# Patient Record
Sex: MALE | Race: ASIAN | NOT HISPANIC OR LATINO
[De-identification: names, ages, dates, MRNs, and addresses within clinical notes are randomized per-mention and may not be internally consistent; named-entity substitution may affect disease eponyms.]

---

## 2017-01-17 ENCOUNTER — APPOINTMENT (OUTPATIENT)
Dept: INTERNAL MEDICINE | Facility: CLINIC | Age: 57
End: 2017-01-17

## 2017-02-10 ENCOUNTER — MEDICATION RENEWAL (OUTPATIENT)
Age: 57
End: 2017-02-10

## 2017-02-14 ENCOUNTER — APPOINTMENT (OUTPATIENT)
Dept: INTERNAL MEDICINE | Facility: CLINIC | Age: 57
End: 2017-02-14

## 2017-02-14 ENCOUNTER — MEDICATION RENEWAL (OUTPATIENT)
Age: 57
End: 2017-02-14

## 2017-02-14 ENCOUNTER — NON-APPOINTMENT (OUTPATIENT)
Age: 57
End: 2017-02-14

## 2017-02-14 VITALS
SYSTOLIC BLOOD PRESSURE: 140 MMHG | HEART RATE: 105 BPM | HEIGHT: 66 IN | WEIGHT: 178 LBS | TEMPERATURE: 98.3 F | DIASTOLIC BLOOD PRESSURE: 80 MMHG | BODY MASS INDEX: 28.61 KG/M2

## 2017-02-15 LAB
ALBUMIN SERPL ELPH-MCNC: 4 G/DL
ALP BLD-CCNC: 70 U/L
ALT SERPL-CCNC: 19 U/L
ANION GAP SERPL CALC-SCNC: 15 MMOL/L
AST SERPL-CCNC: 16 U/L
BILIRUB SERPL-MCNC: 0.2 MG/DL
BUN SERPL-MCNC: 31 MG/DL
CALCIUM SERPL-MCNC: 9.4 MG/DL
CHLORIDE SERPL-SCNC: 102 MMOL/L
CHOLEST SERPL-MCNC: 224 MG/DL
CHOLEST/HDLC SERPL: 5 RATIO
CO2 SERPL-SCNC: 26 MMOL/L
CREAT SERPL-MCNC: 1.55 MG/DL
GLUCOSE SERPL-MCNC: 135 MG/DL
HBA1C MFR BLD HPLC: 11.4 %
HDLC SERPL-MCNC: 45 MG/DL
LDLC SERPL CALC-MCNC: 122 MG/DL
POTASSIUM SERPL-SCNC: 4.4 MMOL/L
PROT SERPL-MCNC: 6.3 G/DL
SODIUM SERPL-SCNC: 143 MMOL/L
TRIGL SERPL-MCNC: 284 MG/DL

## 2017-02-21 NOTE — ASU PATIENT PROFILE, ADULT - PMH
Diabetes Mellitus Type II, Uncontrolled    Diabetic retinopathy    Dyslipidemia    GERD (gastroesophageal reflux disease)    Gout    Hepatitis    HTN (hypertension)    Nephrolithiasis    s/p Angioplasty with Stent    Vitamin D deficiency

## 2017-02-21 NOTE — ASU PATIENT PROFILE, ADULT - PSH
H/O lithotripsy    History of eye surgery    Retinal Hemorrhage    S/P coronary artery stent placement H/O lithotripsy    History of eye surgery  left eye  Retinal Hemorrhage    S/P coronary artery stent placement

## 2017-02-22 ENCOUNTER — OUTPATIENT (OUTPATIENT)
Dept: OUTPATIENT SERVICES | Facility: HOSPITAL | Age: 57
LOS: 1 days | Discharge: HOME | End: 2017-02-22
Payer: COMMERCIAL

## 2017-02-22 ENCOUNTER — TRANSCRIPTION ENCOUNTER (OUTPATIENT)
Age: 57
End: 2017-02-22

## 2017-02-22 VITALS
OXYGEN SATURATION: 99 % | TEMPERATURE: 99 F | WEIGHT: 174.61 LBS | SYSTOLIC BLOOD PRESSURE: 172 MMHG | RESPIRATION RATE: 18 BRPM | DIASTOLIC BLOOD PRESSURE: 90 MMHG | HEART RATE: 90 BPM | HEIGHT: 67 IN

## 2017-02-22 VITALS
HEART RATE: 81 BPM | DIASTOLIC BLOOD PRESSURE: 84 MMHG | RESPIRATION RATE: 16 BRPM | SYSTOLIC BLOOD PRESSURE: 151 MMHG | OXYGEN SATURATION: 99 %

## 2017-02-22 DIAGNOSIS — Z95.5 PRESENCE OF CORONARY ANGIOPLASTY IMPLANT AND GRAFT: Chronic | ICD-10-CM

## 2017-02-22 DIAGNOSIS — Z98.89 OTHER SPECIFIED POSTPROCEDURAL STATES: Chronic | ICD-10-CM

## 2017-02-22 DIAGNOSIS — H43.11 VITREOUS HEMORRHAGE, RIGHT EYE: ICD-10-CM

## 2017-02-22 PROCEDURE — C1889: CPT

## 2017-02-22 PROCEDURE — 67113 REPAIR RETINAL DETACH CPLX: CPT | Mod: RT

## 2017-02-22 NOTE — ASU DISCHARGE PLAN (ADULT/PEDIATRIC). - SPECIAL INSTRUCTIONS
do not rub eye. tylenol for discomfort.  avoid advil motrin aleve aspirin to decrease chance of bleeding. eye kit given to pt.  Discharge and follow up  instructions explained to pt.  pt understands proper use of eye drops and instructions. do not rub eye. tylenol for discomfort.  avoid advil motrin aleve aspirin to decrease chance of bleeding. eye kit given to pt.  Discharge and follow up  instructions explained to pt.  pt understands proper use of eye drops and instructions. do not sleep on back . face down 50 minutes out of each hour.

## 2017-02-22 NOTE — ASU DISCHARGE PLAN (ADULT/PEDIATRIC). - NOTIFY
Pain not relieved by Medications/Fever greater than 101/Bleeding that does not stop/Persistent Nausea and Vomiting Persistent Nausea and Vomiting/Fever greater than 101/Bleeding that does not stop/Pain not relieved by Medications/Inability to Tolerate Liquids or Foods

## 2017-02-22 NOTE — ASU DISCHARGE PLAN (ADULT/PEDIATRIC). - COMMENTS
do not rub eye. tylenol for discomfort.  avoid advil motrin aleve aspirin to decrease chance of bleeding. eye kit given to pt.  Discharge and follow up  instructions explained to pt.  pt understands proper use of eye drops and instructions.

## 2017-03-23 ENCOUNTER — TRANSCRIPTION ENCOUNTER (OUTPATIENT)
Age: 57
End: 2017-03-23

## 2017-03-23 ENCOUNTER — OUTPATIENT (OUTPATIENT)
Dept: OUTPATIENT SERVICES | Facility: HOSPITAL | Age: 57
LOS: 1 days | End: 2017-03-23
Payer: COMMERCIAL

## 2017-03-23 VITALS
HEART RATE: 79 BPM | RESPIRATION RATE: 18 BRPM | DIASTOLIC BLOOD PRESSURE: 83 MMHG | SYSTOLIC BLOOD PRESSURE: 167 MMHG | OXYGEN SATURATION: 98 %

## 2017-03-23 VITALS
HEIGHT: 67 IN | HEART RATE: 85 BPM | TEMPERATURE: 99 F | RESPIRATION RATE: 15 BRPM | WEIGHT: 173.72 LBS | OXYGEN SATURATION: 100 % | SYSTOLIC BLOOD PRESSURE: 178 MMHG | DIASTOLIC BLOOD PRESSURE: 78 MMHG

## 2017-03-23 DIAGNOSIS — H43.11 VITREOUS HEMORRHAGE, RIGHT EYE: ICD-10-CM

## 2017-03-23 DIAGNOSIS — Z95.5 PRESENCE OF CORONARY ANGIOPLASTY IMPLANT AND GRAFT: Chronic | ICD-10-CM

## 2017-03-23 DIAGNOSIS — Z98.89 OTHER SPECIFIED POSTPROCEDURAL STATES: Chronic | ICD-10-CM

## 2017-03-23 DIAGNOSIS — E11.319 TYPE 2 DIABETES MELLITUS WITH UNSPECIFIED DIABETIC RETINOPATHY WITHOUT MACULAR EDEMA: Chronic | ICD-10-CM

## 2017-03-23 PROCEDURE — 67040 LASER TREATMENT OF RETINA: CPT | Mod: RT

## 2017-03-23 PROCEDURE — C1889: CPT

## 2017-03-23 NOTE — ASU DISCHARGE PLAN (ADULT/PEDIATRIC). - NOTIFY
Fever greater than 101/Bleeding that does not stop/Persistent Nausea and Vomiting/Pain not relieved by Medications

## 2017-03-23 NOTE — ASU PATIENT PROFILE, ADULT - PSH
H/O lithotripsy    History of eye surgery  left eye  Retinal Hemorrhage    S/P coronary artery stent placement Diabetes with retinopathy  S/P PPV/PRP/GAS  OD 2/22/17 for viterous hemorrhage  H/O lithotripsy    History of eye surgery  left eye  Retinal Hemorrhage    S/P coronary artery stent placement

## 2017-03-23 NOTE — ASU DISCHARGE PLAN (ADULT/PEDIATRIC). - SPECIAL INSTRUCTIONS
Do not rub the operative eye   Resume regular medications as per your physician's instructions  May take Tylenol (Acetaminophen ) as needed for pain as directed  Bring all eye drops to the doctor's office for your follow-up visit

## 2017-03-27 ENCOUNTER — APPOINTMENT (OUTPATIENT)
Dept: OPHTHALMOLOGY | Facility: CLINIC | Age: 57
End: 2017-03-27

## 2017-04-13 ENCOUNTER — RX RENEWAL (OUTPATIENT)
Age: 57
End: 2017-04-13

## 2017-04-25 ENCOUNTER — LABORATORY RESULT (OUTPATIENT)
Age: 57
End: 2017-04-25

## 2017-04-25 ENCOUNTER — APPOINTMENT (OUTPATIENT)
Dept: INTERNAL MEDICINE | Facility: CLINIC | Age: 57
End: 2017-04-25

## 2017-04-25 VITALS
TEMPERATURE: 98.4 F | WEIGHT: 181 LBS | DIASTOLIC BLOOD PRESSURE: 70 MMHG | HEIGHT: 66 IN | HEART RATE: 116 BPM | BODY MASS INDEX: 29.09 KG/M2 | SYSTOLIC BLOOD PRESSURE: 150 MMHG | OXYGEN SATURATION: 98 %

## 2017-04-25 DIAGNOSIS — M25.449 EFFUSION, UNSPECIFIED HAND: ICD-10-CM

## 2017-04-25 DIAGNOSIS — Z23 ENCOUNTER FOR IMMUNIZATION: ICD-10-CM

## 2017-04-25 DIAGNOSIS — M65.9 SYNOVITIS AND TENOSYNOVITIS, UNSPECIFIED: ICD-10-CM

## 2017-04-25 RX ORDER — SIMVASTATIN 10 MG/1
10 TABLET, FILM COATED ORAL
Refills: 0 | Status: DISCONTINUED | COMMUNITY

## 2017-04-26 DIAGNOSIS — E53.8 DEFICIENCY OF OTHER SPECIFIED B GROUP VITAMINS: ICD-10-CM

## 2017-04-26 LAB
25(OH)D3 SERPL-MCNC: 12.9 NG/ML
ALBUMIN SERPL ELPH-MCNC: 4.2 G/DL
ALP BLD-CCNC: 89 U/L
ALT SERPL-CCNC: 25 U/L
ANION GAP SERPL CALC-SCNC: 17 MMOL/L
APPEARANCE: CLEAR
AST SERPL-CCNC: 27 U/L
BASOPHILS # BLD AUTO: 0 K/UL
BASOPHILS NFR BLD AUTO: 0 %
BILIRUB SERPL-MCNC: 0.2 MG/DL
BILIRUBIN URINE: NEGATIVE
BLOOD URINE: ABNORMAL
BUN SERPL-MCNC: 40 MG/DL
CALCIUM SERPL-MCNC: 9.5 MG/DL
CHLORIDE SERPL-SCNC: 102 MMOL/L
CHOLEST SERPL-MCNC: 234 MG/DL
CHOLEST/HDLC SERPL: 4.8 RATIO
CO2 SERPL-SCNC: 24 MMOL/L
COLOR: YELLOW
CREAT SERPL-MCNC: 1.52 MG/DL
EOSINOPHIL # BLD AUTO: 0.45 K/UL
EOSINOPHIL NFR BLD AUTO: 10 %
GLUCOSE QUALITATIVE U: 250 MG/DL
GLUCOSE SERPL-MCNC: 93 MG/DL
HCT VFR BLD CALC: 39.1 %
HDLC SERPL-MCNC: 49 MG/DL
HGB BLD-MCNC: 12.7 G/DL
KETONES URINE: ABNORMAL
LDLC SERPL CALC-MCNC: 110 MG/DL
LEUKOCYTE ESTERASE URINE: NEGATIVE
LYMPHOCYTES # BLD AUTO: 0.93 K/UL
LYMPHOCYTES NFR BLD AUTO: 20.9 %
MAN DIFF?: NORMAL
MCHC RBC-ENTMCNC: 29.4 PG
MCHC RBC-ENTMCNC: 32.5 GM/DL
MCV RBC AUTO: 90.5 FL
MONOCYTES # BLD AUTO: 0.93 K/UL
MONOCYTES NFR BLD AUTO: 20.9 %
NEUTROPHILS # BLD AUTO: 2.1 K/UL
NEUTROPHILS NFR BLD AUTO: 47.3 %
NITRITE URINE: NEGATIVE
PH URINE: 5.5
PLATELET # BLD AUTO: 234 K/UL
POTASSIUM SERPL-SCNC: 4.5 MMOL/L
PROT SERPL-MCNC: 7 G/DL
PROTEIN URINE: >300 MG/DL
RBC # BLD: 4.32 M/UL
RBC # FLD: 13.5 %
SODIUM SERPL-SCNC: 143 MMOL/L
SPECIFIC GRAVITY URINE: 1.03
TRIGL SERPL-MCNC: 373 MG/DL
TSH SERPL-ACNC: 3.14 UIU/ML
UROBILINOGEN URINE: 1 MG/DL
VIT B12 SERPL-MCNC: 242 PG/ML
WBC # FLD AUTO: 4.45 K/UL

## 2017-05-05 ENCOUNTER — APPOINTMENT (OUTPATIENT)
Dept: OPHTHALMOLOGY | Facility: CLINIC | Age: 57
End: 2017-05-05

## 2017-05-08 ENCOUNTER — EMERGENCY (EMERGENCY)
Facility: HOSPITAL | Age: 57
LOS: 1 days | Discharge: HOME | End: 2017-05-08
Attending: EMERGENCY MEDICINE | Admitting: EMERGENCY MEDICINE
Payer: COMMERCIAL

## 2017-05-08 VITALS
RESPIRATION RATE: 16 BRPM | HEART RATE: 97 BPM | TEMPERATURE: 98 F | OXYGEN SATURATION: 100 % | SYSTOLIC BLOOD PRESSURE: 178 MMHG | DIASTOLIC BLOOD PRESSURE: 116 MMHG

## 2017-05-08 DIAGNOSIS — Z98.89 OTHER SPECIFIED POSTPROCEDURAL STATES: Chronic | ICD-10-CM

## 2017-05-08 DIAGNOSIS — Z95.5 PRESENCE OF CORONARY ANGIOPLASTY IMPLANT AND GRAFT: Chronic | ICD-10-CM

## 2017-05-08 DIAGNOSIS — E11.319 TYPE 2 DIABETES MELLITUS WITH UNSPECIFIED DIABETIC RETINOPATHY WITHOUT MACULAR EDEMA: Chronic | ICD-10-CM

## 2017-05-08 LAB
ALBUMIN SERPL ELPH-MCNC: 3.3 G/DL — SIGNIFICANT CHANGE UP (ref 3.3–5)
ALP SERPL-CCNC: 72 U/L — SIGNIFICANT CHANGE UP (ref 40–120)
ALT FLD-CCNC: 14 U/L — SIGNIFICANT CHANGE UP (ref 4–41)
APTT BLD: 33.6 SEC — SIGNIFICANT CHANGE UP (ref 27.5–37.4)
AST SERPL-CCNC: 14 U/L — SIGNIFICANT CHANGE UP (ref 4–40)
BASOPHILS # BLD AUTO: 0.03 K/UL — SIGNIFICANT CHANGE UP (ref 0–0.2)
BASOPHILS NFR BLD AUTO: 0.4 % — SIGNIFICANT CHANGE UP (ref 0–2)
BILIRUB SERPL-MCNC: 0.3 MG/DL — SIGNIFICANT CHANGE UP (ref 0.2–1.2)
BUN SERPL-MCNC: 35 MG/DL — HIGH (ref 7–23)
CALCIUM SERPL-MCNC: 8.5 MG/DL — SIGNIFICANT CHANGE UP (ref 8.4–10.5)
CHLORIDE SERPL-SCNC: 101 MMOL/L — SIGNIFICANT CHANGE UP (ref 98–107)
CK MB BLD-MCNC: 1.6 — SIGNIFICANT CHANGE UP (ref 0–2.5)
CK MB BLD-MCNC: 1.7 — SIGNIFICANT CHANGE UP (ref 0–2.5)
CK MB BLD-MCNC: 3.05 NG/ML — SIGNIFICANT CHANGE UP (ref 1–6.6)
CK MB BLD-MCNC: 3.59 NG/ML — SIGNIFICANT CHANGE UP (ref 1–6.6)
CK SERPL-CCNC: 194 U/L — SIGNIFICANT CHANGE UP (ref 30–200)
CK SERPL-CCNC: 214 U/L — HIGH (ref 30–200)
CO2 SERPL-SCNC: 22 MMOL/L — SIGNIFICANT CHANGE UP (ref 22–31)
CREAT SERPL-MCNC: 1.78 MG/DL — HIGH (ref 0.5–1.3)
EOSINOPHIL # BLD AUTO: 0.22 K/UL — SIGNIFICANT CHANGE UP (ref 0–0.5)
EOSINOPHIL NFR BLD AUTO: 2.9 % — SIGNIFICANT CHANGE UP (ref 0–6)
GLUCOSE SERPL-MCNC: 379 MG/DL — HIGH (ref 70–99)
HBA1C BLD-MCNC: 9.3 % — HIGH (ref 4–5.6)
HCT VFR BLD CALC: 33.3 % — LOW (ref 39–50)
HGB BLD-MCNC: 10.9 G/DL — LOW (ref 13–17)
IMM GRANULOCYTES NFR BLD AUTO: 0.3 % — SIGNIFICANT CHANGE UP (ref 0–1.5)
INR BLD: 0.89 — SIGNIFICANT CHANGE UP (ref 0.88–1.17)
LYMPHOCYTES # BLD AUTO: 1.06 K/UL — SIGNIFICANT CHANGE UP (ref 1–3.3)
LYMPHOCYTES # BLD AUTO: 13.8 % — SIGNIFICANT CHANGE UP (ref 13–44)
MCHC RBC-ENTMCNC: 29.1 PG — SIGNIFICANT CHANGE UP (ref 27–34)
MCHC RBC-ENTMCNC: 32.7 % — SIGNIFICANT CHANGE UP (ref 32–36)
MCV RBC AUTO: 89 FL — SIGNIFICANT CHANGE UP (ref 80–100)
MONOCYTES # BLD AUTO: 0.57 K/UL — SIGNIFICANT CHANGE UP (ref 0–0.9)
MONOCYTES NFR BLD AUTO: 7.4 % — SIGNIFICANT CHANGE UP (ref 2–14)
NEUTROPHILS # BLD AUTO: 5.77 K/UL — SIGNIFICANT CHANGE UP (ref 1.8–7.4)
NEUTROPHILS NFR BLD AUTO: 75.2 % — SIGNIFICANT CHANGE UP (ref 43–77)
NT-PROBNP SERPL-SCNC: 1308 PG/ML — SIGNIFICANT CHANGE UP
PLATELET # BLD AUTO: 201 K/UL — SIGNIFICANT CHANGE UP (ref 150–400)
PMV BLD: 9.5 FL — SIGNIFICANT CHANGE UP (ref 7–13)
POTASSIUM SERPL-MCNC: 4.2 MMOL/L — SIGNIFICANT CHANGE UP (ref 3.5–5.3)
POTASSIUM SERPL-SCNC: 4.2 MMOL/L — SIGNIFICANT CHANGE UP (ref 3.5–5.3)
PROT SERPL-MCNC: 6 G/DL — SIGNIFICANT CHANGE UP (ref 6–8.3)
PROTHROM AB SERPL-ACNC: 10 SEC — SIGNIFICANT CHANGE UP (ref 9.8–13.1)
RBC # BLD: 3.74 M/UL — LOW (ref 4.2–5.8)
RBC # FLD: 13.5 % — SIGNIFICANT CHANGE UP (ref 10.3–14.5)
SODIUM SERPL-SCNC: 138 MMOL/L — SIGNIFICANT CHANGE UP (ref 135–145)
TROPONIN T SERPL-MCNC: < 0.06 NG/ML — SIGNIFICANT CHANGE UP (ref 0–0.06)
TROPONIN T SERPL-MCNC: < 0.06 NG/ML — SIGNIFICANT CHANGE UP (ref 0–0.06)
WBC # BLD: 7.67 K/UL — SIGNIFICANT CHANGE UP (ref 3.8–10.5)
WBC # FLD AUTO: 7.67 K/UL — SIGNIFICANT CHANGE UP (ref 3.8–10.5)

## 2017-05-08 PROCEDURE — 71020: CPT | Mod: 26

## 2017-05-08 PROCEDURE — 93306 TTE W/DOPPLER COMPLETE: CPT | Mod: 26

## 2017-05-08 PROCEDURE — 99220: CPT

## 2017-05-08 RX ORDER — DEXTROSE 50 % IN WATER 50 %
12.5 SYRINGE (ML) INTRAVENOUS ONCE
Qty: 0 | Refills: 0 | Status: DISCONTINUED | OUTPATIENT
Start: 2017-05-08 | End: 2017-05-12

## 2017-05-08 RX ORDER — SODIUM CHLORIDE 9 MG/ML
1000 INJECTION, SOLUTION INTRAVENOUS
Qty: 0 | Refills: 0 | Status: DISCONTINUED | OUTPATIENT
Start: 2017-05-08 | End: 2017-05-12

## 2017-05-08 RX ORDER — INSULIN LISPRO 100/ML
VIAL (ML) SUBCUTANEOUS
Qty: 0 | Refills: 0 | Status: DISCONTINUED | OUTPATIENT
Start: 2017-05-08 | End: 2017-05-12

## 2017-05-08 RX ORDER — INSULIN LISPRO 100/ML
10 VIAL (ML) SUBCUTANEOUS ONCE
Qty: 0 | Refills: 0 | Status: COMPLETED | OUTPATIENT
Start: 2017-05-08 | End: 2017-05-08

## 2017-05-08 RX ORDER — INSULIN LISPRO 100/ML
6 VIAL (ML) SUBCUTANEOUS ONCE
Qty: 0 | Refills: 0 | Status: COMPLETED | OUTPATIENT
Start: 2017-05-08 | End: 2017-05-08

## 2017-05-08 RX ORDER — SODIUM CHLORIDE 9 MG/ML
1000 INJECTION INTRAMUSCULAR; INTRAVENOUS; SUBCUTANEOUS ONCE
Qty: 0 | Refills: 0 | Status: COMPLETED | OUTPATIENT
Start: 2017-05-08 | End: 2017-05-08

## 2017-05-08 RX ORDER — ASPIRIN/CALCIUM CARB/MAGNESIUM 324 MG
325 TABLET ORAL DAILY
Qty: 0 | Refills: 0 | Status: DISCONTINUED | OUTPATIENT
Start: 2017-05-08 | End: 2017-05-12

## 2017-05-08 RX ORDER — TAMSULOSIN HYDROCHLORIDE 0.4 MG/1
0.4 CAPSULE ORAL AT BEDTIME
Qty: 0 | Refills: 0 | Status: DISCONTINUED | OUTPATIENT
Start: 2017-05-08 | End: 2017-05-12

## 2017-05-08 RX ORDER — INSULIN GLARGINE 100 [IU]/ML
55 INJECTION, SOLUTION SUBCUTANEOUS AT BEDTIME
Qty: 0 | Refills: 0 | Status: DISCONTINUED | OUTPATIENT
Start: 2017-05-08 | End: 2017-05-12

## 2017-05-08 RX ORDER — ALLOPURINOL 300 MG
300 TABLET ORAL
Qty: 0 | Refills: 0 | Status: DISCONTINUED | OUTPATIENT
Start: 2017-05-08 | End: 2017-05-12

## 2017-05-08 RX ORDER — LOSARTAN POTASSIUM 100 MG/1
25 TABLET, FILM COATED ORAL DAILY
Qty: 0 | Refills: 0 | Status: DISCONTINUED | OUTPATIENT
Start: 2017-05-08 | End: 2017-05-12

## 2017-05-08 RX ORDER — DEXTROSE 50 % IN WATER 50 %
1 SYRINGE (ML) INTRAVENOUS ONCE
Qty: 0 | Refills: 0 | Status: DISCONTINUED | OUTPATIENT
Start: 2017-05-08 | End: 2017-05-12

## 2017-05-08 RX ORDER — INSULIN GLARGINE 100 [IU]/ML
10 INJECTION, SOLUTION SUBCUTANEOUS ONCE
Qty: 0 | Refills: 0 | Status: DISCONTINUED | OUTPATIENT
Start: 2017-05-08 | End: 2017-05-08

## 2017-05-08 RX ORDER — DEXTROSE 50 % IN WATER 50 %
25 SYRINGE (ML) INTRAVENOUS ONCE
Qty: 0 | Refills: 0 | Status: DISCONTINUED | OUTPATIENT
Start: 2017-05-08 | End: 2017-05-12

## 2017-05-08 RX ORDER — GLUCAGON INJECTION, SOLUTION 0.5 MG/.1ML
1 INJECTION, SOLUTION SUBCUTANEOUS ONCE
Qty: 0 | Refills: 0 | Status: DISCONTINUED | OUTPATIENT
Start: 2017-05-08 | End: 2017-05-12

## 2017-05-08 RX ORDER — METFORMIN HYDROCHLORIDE 850 MG/1
500 TABLET ORAL
Qty: 0 | Refills: 0 | Status: DISCONTINUED | OUTPATIENT
Start: 2017-05-08 | End: 2017-05-08

## 2017-05-08 RX ORDER — ATORVASTATIN CALCIUM 80 MG/1
40 TABLET, FILM COATED ORAL AT BEDTIME
Qty: 0 | Refills: 0 | Status: DISCONTINUED | OUTPATIENT
Start: 2017-05-08 | End: 2017-05-12

## 2017-05-08 RX ORDER — INSULIN LISPRO 100/ML
15 VIAL (ML) SUBCUTANEOUS
Qty: 0 | Refills: 0 | Status: DISCONTINUED | OUTPATIENT
Start: 2017-05-08 | End: 2017-05-12

## 2017-05-08 RX ORDER — LORATADINE 10 MG/1
10 TABLET ORAL DAILY
Qty: 0 | Refills: 0 | Status: DISCONTINUED | OUTPATIENT
Start: 2017-05-08 | End: 2017-05-12

## 2017-05-08 RX ORDER — ASPIRIN/CALCIUM CARB/MAGNESIUM 324 MG
162 TABLET ORAL ONCE
Qty: 0 | Refills: 0 | Status: COMPLETED | OUTPATIENT
Start: 2017-05-08 | End: 2017-05-08

## 2017-05-08 RX ORDER — LOSARTAN POTASSIUM 100 MG/1
25 TABLET, FILM COATED ORAL ONCE
Qty: 0 | Refills: 0 | Status: COMPLETED | OUTPATIENT
Start: 2017-05-08 | End: 2017-05-08

## 2017-05-08 RX ORDER — METOPROLOL TARTRATE 50 MG
50 TABLET ORAL ONCE
Qty: 0 | Refills: 0 | Status: COMPLETED | OUTPATIENT
Start: 2017-05-08 | End: 2017-05-08

## 2017-05-08 RX ADMIN — INSULIN GLARGINE 55 UNIT(S): 100 INJECTION, SOLUTION SUBCUTANEOUS at 21:35

## 2017-05-08 RX ADMIN — Medication 50 MILLIGRAM(S): at 07:21

## 2017-05-08 RX ADMIN — SODIUM CHLORIDE 1000 MILLILITER(S): 9 INJECTION INTRAMUSCULAR; INTRAVENOUS; SUBCUTANEOUS at 19:46

## 2017-05-08 RX ADMIN — TAMSULOSIN HYDROCHLORIDE 0.4 MILLIGRAM(S): 0.4 CAPSULE ORAL at 21:35

## 2017-05-08 RX ADMIN — LOSARTAN POTASSIUM 25 MILLIGRAM(S): 100 TABLET, FILM COATED ORAL at 18:47

## 2017-05-08 RX ADMIN — Medication 162 MILLIGRAM(S): at 07:21

## 2017-05-08 RX ADMIN — LORATADINE 10 MILLIGRAM(S): 10 TABLET ORAL at 18:47

## 2017-05-08 RX ADMIN — LOSARTAN POTASSIUM 25 MILLIGRAM(S): 100 TABLET, FILM COATED ORAL at 07:21

## 2017-05-08 RX ADMIN — Medication 6 UNIT(S): at 14:50

## 2017-05-08 RX ADMIN — Medication 10 UNIT(S): at 18:45

## 2017-05-08 RX ADMIN — Medication 300 MILLIGRAM(S): at 18:47

## 2017-05-08 RX ADMIN — ATORVASTATIN CALCIUM 40 MILLIGRAM(S): 80 TABLET, FILM COATED ORAL at 21:35

## 2017-05-08 NOTE — ED PROVIDER NOTE - PSH
Diabetes with retinopathy  S/P PPV/PRP/GAS  OD 2/22/17 for viterous hemorrhage  H/O lithotripsy    History of eye surgery  left eye  Retinal Hemorrhage    S/P coronary artery stent placement

## 2017-05-08 NOTE — ED CDU PROVIDER NOTE - CHPI ED SYMPTOMS NEG
no fever/no syncope/no cough/no chills/no nausea/no back pain/no vomiting/no dizziness/no diaphoresis

## 2017-05-08 NOTE — ED ADULT TRIAGE NOTE - CHIEF COMPLAINT QUOTE
c.o cough sob and chest pain since last night. able to speak full sentences in triage. denies nausea/vomiting. elevated bp noted in triage, pt states he has not taken morning htn medications yet. pmh hepatitis, GERD, gout, htn

## 2017-05-08 NOTE — ED PROVIDER NOTE - MEDICAL DECISION MAKING DETAILS
CP concerning for ACS, EKG unchanged, will check labs/cxr, asa, morning BP meds, admit vs CDU for further cardiac workup CP concerning for ACS vs new onset CHF, EKG unchanged, will check labs/cxr, asa, morning BP meds, admit vs CDU for further cardiac workup. Well's PE score: 0 points.  Unlikely PE, also no risk factors.

## 2017-05-08 NOTE — ED PROVIDER NOTE - OBJECTIVE STATEMENT
58yo M w/ DM, HTN, CAD/stents, p/w CP described as tightness and SOB starting yest evening while doing household chores, had trouble sleeping 2/2 CP/SOB, symptoms did not improve so came to ED.  Denies fever, cough, diaphoresis, N/V, leg pain/swelling.  Last cath 2011 at [facility], last stress 4mo ago (pre-op for eye surgery), reportedly normal per pt.  Cards: Jonathan Torres ([place]) 56yo M w/ DM, HTN, CAD/stents, p/w CP described as tightness and SOB starting yesterday evening while doing light household chores, had trouble sleeping 2/2 CP/SOB, symptoms did not improve so came to ED.  Today pain somewhat better, but feeling SOB more. Denies fever, cough, diaphoresis, N/V, leg pain/swelling.  Last cath 2011 at [facility], last stress 4mo ago (pre-op for eye surgery), reportedly normal per pt.  NAD. No recent travel or immobilization.     Cards: Jonathan Torres ([place])

## 2017-05-08 NOTE — ED CDU PROVIDER NOTE - OBJECTIVE STATEMENT
57 year old male PMHx DM, HTN, diabetic retinopathy and neuropathy, cad with 2 stents, HLD, gout, htn presents with 1 day hx of CP non radiating and SOB q 5 minutes when he needs to "breath through his mouth".  1 day hx of difficulty ambulating due to cp and sob.   denies n/v, diaphoresis, back, neck and jaw pain.  denies recent travel and surgery. experiencing leg pain, numbness and tingling in LE, unchanged from prior.  denies erythema and or 1 calf enlargement.  perc score- 1, pmd Dr javier hickman, endo- dr. maikel jolly, card - dr mancia  elmer 57 year old male PMHx DM, HTN, diabetic retinopathy and neuropathy, cad with 2 stents, HLD, gout, htn presents with 1 day hx of CP non radiating and SOB q 5 minutes when he needs to "breath through his mouth".  1 day hx of difficulty ambulating due to cp and sob.   denies n/v, diaphoresis, back, neck and jaw pain.  denies recent travel and surgery. experiencing leg pain, numbness and tingling in LE, unchanged from prior.  denies erythema and or 1 calf enlargement.  perc score- 1, pmd Dr javier hickman, endo- dr. maikel jolly, card - dr gavino engle. 1st set troponin negative ekg negative

## 2017-05-08 NOTE — ED CDU PROVIDER NOTE - PLAN OF CARE
Follow up with your cardiologist for a post hospital visit today at 11:30 , taking copies of all results from the ER to be reviewed.   Continue all medications as previously prescribed prior to this hospital visit.  If any worsening , concerning , signs or symptoms return to the ER prior to seeing your cardiologist. Set up a follow up withy our primary physician for this week as well.

## 2017-05-08 NOTE — ED PROVIDER NOTE - PROGRESS NOTE DETAILS
Pt still CP free but having some SOB. Evaluated by cardiology, they do not recommend emergent cath as pt's primary complaint is SOB at this time, but they recommend echo and r/o. Will place in CDU for second set of CE and TTE.

## 2017-05-08 NOTE — ED CDU PROVIDER NOTE - PROGRESS NOTE DETAILS
maddie garcia - echo results reviewed with attending, pt c/o sob order cta maddie garcia - pt does not want endo consult.  Dr jolly pts - endo attending.  echo results reviewed with attending, pt c/o sob order cta cdu maddie olivarez: pt resting comfortably in b ed, has no complaints at thistime, will get a cta to r/o pe, vss, nad, l/s: clear b/l, CV: rrr no r,g,m ABD: snt nd nbs will reases ADELINE pts cardiologist Dr CONSUELO Torres over at [facility]. Pt can come see him in the office at 11:30 today.  DW Dr Zepeda who is ok with plan to dc pt home with private cardio fu shortly today. CDU MD CHO:  Pt resting comfortably, continues to have some mild chest discomfort and sob.  A&Ox3, ctabil, s1s2 rrr no m/r/g, abd soft non ttp no r/g, no pedal edema b/l.  Rpt ce's neg, no ekg changes.  Will discuss with pt cardiologist re: dispo. CDU MD CHO - Attending Discharge Note: Patient is stable.  Labs and tests reviewed with the patient.  The patient is stable for discharge.

## 2017-05-08 NOTE — ED CDU PROVIDER NOTE - ATTENDING CONTRIBUTION TO CARE
DR. GONZALEZ, ATTENDING MD-  I performed a face to face bedside interview with patient regarding history of present illness, review of symptoms and past medical history. I completed an independent physical exam.  I have discussed patient's plan of care with PA.   Documentation as above in the note.    58 y/o male with h/o cad with two stents, dm, htn c/o cp, sob.  Denies f/c, ha, neck stiffness, cough, abd pain, n/v/d, dysuria, rash.  Afebrile, vs wnl, nad, ctabil, s1s2 rrr no m/r/g, abd soft non ttp no r/g, no cva tenderness b/l, no leg swelling b/l, no rash.  Sent to CDU for acs r/o, cycle enzymes/ekg's, call pt's cardiologist to arrange for close f/u if neg ed w/u. DR. GONZALEZ, ATTENDING MD-  I performed a face to face bedside interview with patient regarding history of present illness, review of symptoms and past medical history. I completed an independent physical exam.  I have discussed patient's plan of care with PA.   Documentation as above in the note.    56 y/o male with h/o cad with two stents, dm, htn c/o cp, sob.  Denies f/c, ha, neck stiffness, cough, abd pain, n/v/d, dysuria, rash.  Afebrile, vs wnl, nad, ctabil, s1s2 rrr no m/r/g, abd soft non ttp no r/g, no cva tenderness b/l, no leg swelling b/l, no rash.  Sent to CDU for acs r/o, cycle enzymes/ekg's, call pt's cardiologist to arrange for close f/u if neg ed w/u.    CDU ACCEPTING ATTG NOTE Dr. Ambriz: I performed a face to face bedside interview with patient regarding history of present illness, review of symptoms and past medical history. I completed an independent physical exam.  I have discussed patient's plan of care with PA.   I agree with note as stated above, having amended the EMR as needed to reflect my findings.   This includes HISTORY OF PRESENT ILLNESS, HIV, PAST MEDICAL/SURGICAL/FAMILY/SOCIAL HISTORY, ALLERGIES AND HOME MEDICATIONS, REVIEW OF SYSTEMS, PHYSICAL EXAM, and any PROGRESS NOTES during the time I functioned as the attending physician for this patient.   57M hx htn, hld, dm, dm retinopathy and neuropathy, cad stents x 2 c/o intermitt cp and sob x 1 day. Past day patient notes cp, left sided, nonrad, q5 min at rest, associated with mouth breathing to catch breath, worse with ambulation. Denies fever, chills, n/v, palpitations, leg swelling, hemoptysis, pleurisy. Last cath 2011, last stress 4 mo ago nl as per patient not documented in EMR. PE nad, aaox3, ctabl, rrr, s1s2, abd soft ntnd, neg le edema. PLAN ce x 2, echo. Patient’s ce x 2 negative. ECHO c/w previous area of infarct. Patient anxious regarding persistent q5min labored breathing. Decision to CTA r/o PE.

## 2017-05-09 VITALS
HEART RATE: 97 BPM | RESPIRATION RATE: 18 BRPM | TEMPERATURE: 98 F | OXYGEN SATURATION: 97 % | SYSTOLIC BLOOD PRESSURE: 149 MMHG | DIASTOLIC BLOOD PRESSURE: 94 MMHG

## 2017-05-09 LAB
BASE EXCESS BLDV CALC-SCNC: 3.3 MMOL/L — SIGNIFICANT CHANGE UP
BLOOD GAS VENOUS - CREATININE: 1.32 MG/DL — HIGH (ref 0.5–1.3)
CHLORIDE BLDV-SCNC: 110 MMOL/L — HIGH (ref 96–108)
GAS PNL BLDV: 138 MMOL/L — SIGNIFICANT CHANGE UP (ref 136–146)
GLUCOSE BLDV-MCNC: 136 — HIGH (ref 70–99)
HCO3 BLDV-SCNC: 25 MMOL/L — SIGNIFICANT CHANGE UP (ref 20–27)
HCT VFR BLDV CALC: 40.3 % — SIGNIFICANT CHANGE UP (ref 39–51)
HGB BLDV-MCNC: 13.1 G/DL — SIGNIFICANT CHANGE UP (ref 13–17)
LACTATE BLDV-MCNC: 1.3 MMOL/L — SIGNIFICANT CHANGE UP (ref 0.5–2)
PCO2 BLDV: 45 MMHG — SIGNIFICANT CHANGE UP (ref 41–51)
PH BLDV: 7.4 PH — SIGNIFICANT CHANGE UP (ref 7.32–7.43)
PO2 BLDV: < 24 MMHG — LOW (ref 35–40)
POTASSIUM BLDV-SCNC: 3.5 MMOL/L — SIGNIFICANT CHANGE UP (ref 3.4–4.5)
SAO2 % BLDV: 24 % — LOW (ref 60–85)

## 2017-05-09 PROCEDURE — 99217: CPT

## 2017-05-09 PROCEDURE — 71275 CT ANGIOGRAPHY CHEST: CPT | Mod: 26

## 2017-05-09 RX ADMIN — LOSARTAN POTASSIUM 25 MILLIGRAM(S): 100 TABLET, FILM COATED ORAL at 05:51

## 2017-05-09 RX ADMIN — Medication 15 UNIT(S): at 09:38

## 2017-06-23 ENCOUNTER — RX RENEWAL (OUTPATIENT)
Age: 57
End: 2017-06-23

## 2017-06-26 NOTE — ED PROVIDER NOTE - ATTENDING CONTRIBUTION TO CARE
written material/verbal instruction/audio Attending Attestation: Dr. Winkler  I have personally performed a history and physical examination of the patient and discussed management with the resident as well as the patient.  I reviewed the resident's note and agree with the documented findings and plan of care.  I have authored and modified critical sections of the Provider Note, including but not limited to HPI, Physical Exam and MDM. CP concerning for ACS vs new onset CHF, EKG unchanged, will check labs/cxr, asa, morning BP meds, admit vs CDU for further cardiac workup. Well's PE score: 0 points.  Unlikely PE, also no risk factors.

## 2017-07-25 ENCOUNTER — APPOINTMENT (OUTPATIENT)
Dept: INTERNAL MEDICINE | Facility: CLINIC | Age: 57
End: 2017-07-25

## 2017-07-26 ENCOUNTER — NON-APPOINTMENT (OUTPATIENT)
Age: 57
End: 2017-07-26

## 2017-07-26 ENCOUNTER — RX RENEWAL (OUTPATIENT)
Age: 57
End: 2017-07-26

## 2017-07-26 ENCOUNTER — APPOINTMENT (OUTPATIENT)
Dept: INTERNAL MEDICINE | Facility: CLINIC | Age: 57
End: 2017-07-26

## 2017-07-26 VITALS
BODY MASS INDEX: 30.37 KG/M2 | OXYGEN SATURATION: 98 % | HEART RATE: 78 BPM | DIASTOLIC BLOOD PRESSURE: 85 MMHG | WEIGHT: 189 LBS | TEMPERATURE: 98.7 F | SYSTOLIC BLOOD PRESSURE: 160 MMHG | HEIGHT: 66 IN

## 2017-07-26 DIAGNOSIS — Z01.818 ENCOUNTER FOR OTHER PREPROCEDURAL EXAMINATION: ICD-10-CM

## 2017-07-26 RX ORDER — ASPIRIN 81 MG/1
81 TABLET ORAL
Refills: 0 | Status: ACTIVE | COMMUNITY
Start: 2017-07-26

## 2017-08-02 NOTE — ASU PATIENT PROFILE, ADULT - PMH
ASHD (arteriosclerotic heart disease)    CKD (chronic kidney disease)    Diabetes Mellitus Type II, Uncontrolled    Diabetic retinopathy    Dyslipidemia    GERD (gastroesophageal reflux disease)    Gout    Hepatitis    HTN (hypertension)    Ischemic cardiomyopathy    Nephrolithiasis    Pulmonary hypertension    s/p Angioplasty with Stent    Vitamin D deficiency ASHD (arteriosclerotic heart disease)    CKD (chronic kidney disease)    Diabetes Mellitus Type II, Uncontrolled    Diabetic retinopathy    Dyslipidemia    GERD (gastroesophageal reflux disease)    Gout    Hepatitis    HTN (hypertension)    Ischemic cardiomyopathy    Myocardial infarction    Nephrolithiasis    Pulmonary hypertension    s/p Angioplasty with Stent    Vitamin D deficiency

## 2017-08-02 NOTE — ASU PATIENT PROFILE, ADULT - PSH
Diabetes with retinopathy  S/P PPV/PRP/GAS  OD 2/22/17 for viterous hemorrhage  H/O lithotripsy    History of eye surgery  left eye  Retinal Hemorrhage    S/P coronary artery stent placement  2010 TRICIA to Diag ; 11/18/2010  LAD; 2/4/11 ATOM liberte Diag; 5/15/2017  TRICIA to 1st diag; 6/7/17 PCI with laser and high pressure balloon CHF with cardiomyopathy  Insertion of Cardiomems monitor  Diabetes with retinopathy  S/P PPV/PRP/GAS  OD 2/22/17 for viterous hemorrhage  H/O lithotripsy    History of eye surgery  left eye  Retinal Hemorrhage    S/P coronary artery stent placement  2010 TRICIA to Diag ; 11/18/2010  LAD; 2/4/11 ATOM liberte Diag; 5/15/2017  TRICIA to 1st diag; 6/7/17 PCI with laser and high pressure balloon

## 2017-08-03 ENCOUNTER — OUTPATIENT (OUTPATIENT)
Dept: OUTPATIENT SERVICES | Facility: HOSPITAL | Age: 57
LOS: 1 days | End: 2017-08-03
Payer: COMMERCIAL

## 2017-08-03 ENCOUNTER — APPOINTMENT (OUTPATIENT)
Dept: OPHTHALMOLOGY | Facility: HOSPITAL | Age: 57
End: 2017-08-03

## 2017-08-03 ENCOUNTER — TRANSCRIPTION ENCOUNTER (OUTPATIENT)
Age: 57
End: 2017-08-03

## 2017-08-03 VITALS
OXYGEN SATURATION: 100 % | DIASTOLIC BLOOD PRESSURE: 81 MMHG | HEART RATE: 82 BPM | SYSTOLIC BLOOD PRESSURE: 158 MMHG | RESPIRATION RATE: 17 BRPM

## 2017-08-03 VITALS
HEART RATE: 80 BPM | OXYGEN SATURATION: 100 % | RESPIRATION RATE: 16 BRPM | DIASTOLIC BLOOD PRESSURE: 80 MMHG | HEIGHT: 67 IN | WEIGHT: 182.98 LBS | TEMPERATURE: 98 F | SYSTOLIC BLOOD PRESSURE: 154 MMHG

## 2017-08-03 DIAGNOSIS — Z95.5 PRESENCE OF CORONARY ANGIOPLASTY IMPLANT AND GRAFT: Chronic | ICD-10-CM

## 2017-08-03 DIAGNOSIS — Z98.89 OTHER SPECIFIED POSTPROCEDURAL STATES: Chronic | ICD-10-CM

## 2017-08-03 DIAGNOSIS — H25.812 COMBINED FORMS OF AGE-RELATED CATARACT, LEFT EYE: ICD-10-CM

## 2017-08-03 DIAGNOSIS — I50.9 HEART FAILURE, UNSPECIFIED: Chronic | ICD-10-CM

## 2017-08-03 DIAGNOSIS — E11.319 TYPE 2 DIABETES MELLITUS WITH UNSPECIFIED DIABETIC RETINOPATHY WITHOUT MACULAR EDEMA: Chronic | ICD-10-CM

## 2017-08-03 PROCEDURE — 66984 XCAPSL CTRC RMVL W/O ECP: CPT | Mod: LT

## 2017-08-03 PROCEDURE — C1780: CPT

## 2017-08-03 NOTE — ASU DISCHARGE PLAN (ADULT/PEDIATRIC). - SPECIAL INSTRUCTIONS
Please start using both vigamox and Pred forte one drop three times per day in the left eye. Also use both drops once before seeing Dr. Jimenez tomorrow at [address] [place], [place].

## 2017-08-03 NOTE — ASU DISCHARGE PLAN (ADULT/PEDIATRIC). - PT EDUC
Implant card (specify)/intraocular lens Implant card (specify)/intraocular lens, Eye shield with instructions , sunglasses and eye kit given to patient.

## 2017-08-03 NOTE — ASU DISCHARGE PLAN (ADULT/PEDIATRIC). - NOTIFY
Pain not relieved by Medications/Fever greater than 101/Persistent Nausea and Vomiting Pain not relieved by Medications/Fever greater than 101/Swelling that continues/Persistent Nausea and Vomiting/Bleeding that does not stop

## 2017-08-04 ENCOUNTER — APPOINTMENT (OUTPATIENT)
Dept: OPHTHALMOLOGY | Facility: CLINIC | Age: 57
End: 2017-08-04
Payer: COMMERCIAL

## 2017-08-04 PROCEDURE — 99024 POSTOP FOLLOW-UP VISIT: CPT

## 2017-08-09 PROBLEM — I27.2 OTHER SECONDARY PULMONARY HYPERTENSION: Chronic | Status: ACTIVE | Noted: 2017-08-03

## 2017-08-09 PROBLEM — I25.5 ISCHEMIC CARDIOMYOPATHY: Chronic | Status: ACTIVE | Noted: 2017-08-03

## 2017-08-09 PROBLEM — N18.9 CHRONIC KIDNEY DISEASE, UNSPECIFIED: Chronic | Status: ACTIVE | Noted: 2017-08-03

## 2017-08-09 PROBLEM — I25.10 ATHEROSCLEROTIC HEART DISEASE OF NATIVE CORONARY ARTERY WITHOUT ANGINA PECTORIS: Chronic | Status: ACTIVE | Noted: 2017-08-03

## 2017-08-11 ENCOUNTER — APPOINTMENT (OUTPATIENT)
Dept: OPHTHALMOLOGY | Facility: CLINIC | Age: 57
End: 2017-08-11
Payer: COMMERCIAL

## 2017-08-11 PROCEDURE — 92134 CPTRZ OPH DX IMG PST SGM RTA: CPT

## 2017-08-11 PROCEDURE — 99024 POSTOP FOLLOW-UP VISIT: CPT

## 2017-08-23 ENCOUNTER — APPOINTMENT (OUTPATIENT)
Dept: INTERNAL MEDICINE | Facility: CLINIC | Age: 57
End: 2017-08-23
Payer: COMMERCIAL

## 2017-08-23 VITALS
HEIGHT: 66 IN | OXYGEN SATURATION: 98 % | DIASTOLIC BLOOD PRESSURE: 80 MMHG | SYSTOLIC BLOOD PRESSURE: 150 MMHG | TEMPERATURE: 98.4 F | HEART RATE: 86 BPM | BODY MASS INDEX: 29.89 KG/M2 | WEIGHT: 186 LBS

## 2017-08-23 PROCEDURE — 99243 OFF/OP CNSLTJ NEW/EST LOW 30: CPT

## 2017-08-25 ENCOUNTER — APPOINTMENT (OUTPATIENT)
Dept: OPHTHALMOLOGY | Facility: CLINIC | Age: 57
End: 2017-08-25
Payer: COMMERCIAL

## 2017-08-25 ENCOUNTER — RX RENEWAL (OUTPATIENT)
Age: 57
End: 2017-08-25

## 2017-08-25 PROCEDURE — 92136 OPHTHALMIC BIOMETRY: CPT | Mod: 26,RT

## 2017-08-25 PROCEDURE — 92014 COMPRE OPH EXAM EST PT 1/>: CPT | Mod: 24

## 2017-09-04 ENCOUNTER — RX RENEWAL (OUTPATIENT)
Age: 57
End: 2017-09-04

## 2017-09-06 NOTE — ASU PATIENT PROFILE, ADULT - PMH
ASHD (arteriosclerotic heart disease)    CKD (chronic kidney disease)    Diabetes Mellitus Type II, Uncontrolled    Diabetic retinopathy    Dyslipidemia    GERD (gastroesophageal reflux disease)    Gout    Hepatitis    HTN (hypertension)    Ischemic cardiomyopathy    Myocardial infarction    Nephrolithiasis    Pulmonary hypertension    s/p Angioplasty with Stent    Vitamin D deficiency

## 2017-09-06 NOTE — ASU PATIENT PROFILE, ADULT - PSH
CHF with cardiomyopathy  Insertion of Cardiomems monitor  Diabetes with retinopathy  S/P PPV/PRP/GAS  OD 2/22/17 for viterous hemorrhage  H/O lithotripsy    History of eye surgery  left eye  Retinal Hemorrhage    S/P coronary artery stent placement  2010 TRICIA to Diag ; 11/18/2010  LAD; 2/4/11 ATOM liberte Diag; 5/15/2017  TRICIA to 1st diag; 6/7/17 PCI with laser and high pressure balloon

## 2017-09-07 ENCOUNTER — OUTPATIENT (OUTPATIENT)
Dept: OUTPATIENT SERVICES | Facility: HOSPITAL | Age: 57
LOS: 1 days | End: 2017-09-07
Payer: COMMERCIAL

## 2017-09-07 ENCOUNTER — TRANSCRIPTION ENCOUNTER (OUTPATIENT)
Age: 57
End: 2017-09-07

## 2017-09-07 ENCOUNTER — APPOINTMENT (OUTPATIENT)
Dept: OPHTHALMOLOGY | Facility: HOSPITAL | Age: 57
End: 2017-09-07

## 2017-09-07 VITALS
HEART RATE: 86 BPM | SYSTOLIC BLOOD PRESSURE: 159 MMHG | RESPIRATION RATE: 20 BRPM | OXYGEN SATURATION: 96 % | DIASTOLIC BLOOD PRESSURE: 71 MMHG

## 2017-09-07 VITALS
TEMPERATURE: 99 F | OXYGEN SATURATION: 95 % | SYSTOLIC BLOOD PRESSURE: 161 MMHG | DIASTOLIC BLOOD PRESSURE: 78 MMHG | HEART RATE: 80 BPM | HEIGHT: 67 IN | WEIGHT: 184.31 LBS | RESPIRATION RATE: 14 BRPM

## 2017-09-07 DIAGNOSIS — Z95.5 PRESENCE OF CORONARY ANGIOPLASTY IMPLANT AND GRAFT: Chronic | ICD-10-CM

## 2017-09-07 DIAGNOSIS — Z98.89 OTHER SPECIFIED POSTPROCEDURAL STATES: Chronic | ICD-10-CM

## 2017-09-07 DIAGNOSIS — I50.9 HEART FAILURE, UNSPECIFIED: Chronic | ICD-10-CM

## 2017-09-07 DIAGNOSIS — E11.319 TYPE 2 DIABETES MELLITUS WITH UNSPECIFIED DIABETIC RETINOPATHY WITHOUT MACULAR EDEMA: Chronic | ICD-10-CM

## 2017-09-07 DIAGNOSIS — H25.811 COMBINED FORMS OF AGE-RELATED CATARACT, RIGHT EYE: ICD-10-CM

## 2017-09-07 PROCEDURE — C1780: CPT

## 2017-09-07 PROCEDURE — 66984 XCAPSL CTRC RMVL W/O ECP: CPT | Mod: 79,RT

## 2017-09-07 PROCEDURE — 66984 XCAPSL CTRC RMVL W/O ECP: CPT | Mod: RT

## 2017-09-07 NOTE — ASU DISCHARGE PLAN (ADULT/PEDIATRIC). - NOTIFY
Persistent Nausea and Vomiting/Swelling that continues/Pain not relieved by Medications/Fever greater than 101/Bleeding that does not stop

## 2017-09-08 ENCOUNTER — APPOINTMENT (OUTPATIENT)
Dept: OPHTHALMOLOGY | Facility: CLINIC | Age: 57
End: 2017-09-08
Payer: COMMERCIAL

## 2017-09-08 PROCEDURE — 99024 POSTOP FOLLOW-UP VISIT: CPT

## 2017-09-15 ENCOUNTER — APPOINTMENT (OUTPATIENT)
Dept: OPHTHALMOLOGY | Facility: CLINIC | Age: 57
End: 2017-09-15

## 2017-09-18 ENCOUNTER — APPOINTMENT (OUTPATIENT)
Dept: OPHTHALMOLOGY | Facility: CLINIC | Age: 57
End: 2017-09-18

## 2017-09-20 ENCOUNTER — APPOINTMENT (OUTPATIENT)
Dept: OPHTHALMOLOGY | Facility: CLINIC | Age: 57
End: 2017-09-20
Payer: COMMERCIAL

## 2017-09-20 PROCEDURE — 99024 POSTOP FOLLOW-UP VISIT: CPT

## 2017-09-25 ENCOUNTER — APPOINTMENT (OUTPATIENT)
Dept: OPHTHALMOLOGY | Facility: CLINIC | Age: 57
End: 2017-09-25
Payer: COMMERCIAL

## 2017-09-25 DIAGNOSIS — H26.9 UNSPECIFIED CATARACT: ICD-10-CM

## 2017-09-25 PROCEDURE — 92134 CPTRZ OPH DX IMG PST SGM RTA: CPT

## 2017-09-25 PROCEDURE — 99024 POSTOP FOLLOW-UP VISIT: CPT

## 2017-12-19 ENCOUNTER — LABORATORY RESULT (OUTPATIENT)
Age: 57
End: 2017-12-19

## 2017-12-19 ENCOUNTER — APPOINTMENT (OUTPATIENT)
Dept: INTERNAL MEDICINE | Facility: CLINIC | Age: 57
End: 2017-12-19
Payer: COMMERCIAL

## 2017-12-19 VITALS
SYSTOLIC BLOOD PRESSURE: 130 MMHG | OXYGEN SATURATION: 98 % | DIASTOLIC BLOOD PRESSURE: 60 MMHG | HEART RATE: 92 BPM | WEIGHT: 181 LBS | HEIGHT: 66 IN | BODY MASS INDEX: 29.09 KG/M2 | TEMPERATURE: 98.4 F

## 2017-12-19 LAB — HBA1C MFR BLD HPLC: 12

## 2017-12-19 PROCEDURE — 99214 OFFICE O/P EST MOD 30 MIN: CPT | Mod: 25

## 2017-12-19 PROCEDURE — 36415 COLL VENOUS BLD VENIPUNCTURE: CPT

## 2017-12-19 PROCEDURE — 83036 HEMOGLOBIN GLYCOSYLATED A1C: CPT | Mod: QW

## 2017-12-19 RX ORDER — CANAGLIFLOZIN 300 MG/1
300 TABLET, FILM COATED ORAL DAILY
Refills: 0 | Status: DISCONTINUED | COMMUNITY
Start: 2017-04-25 | End: 2017-12-19

## 2017-12-19 RX ORDER — METFORMIN ER 500 MG 500 MG/1
500 TABLET ORAL
Qty: 120 | Refills: 0 | Status: DISCONTINUED | COMMUNITY
Start: 2016-08-19 | End: 2017-12-19

## 2017-12-19 RX ORDER — ATORVASTATIN CALCIUM 40 MG/1
40 TABLET, FILM COATED ORAL
Qty: 90 | Refills: 1 | Status: DISCONTINUED | COMMUNITY
Start: 2017-04-25 | End: 2017-12-19

## 2017-12-19 RX ORDER — AMLODIPINE BESYLATE 5 MG/1
5 TABLET ORAL
Qty: 30 | Refills: 0 | Status: DISCONTINUED | COMMUNITY
Start: 2017-11-02 | End: 2017-12-19

## 2017-12-20 LAB
ALBUMIN SERPL ELPH-MCNC: 3.7 G/DL
ALP BLD-CCNC: 128 U/L
ALT SERPL-CCNC: 14 U/L
ANION GAP SERPL CALC-SCNC: 16 MMOL/L
APPEARANCE: CLEAR
AST SERPL-CCNC: 16 U/L
BILIRUB SERPL-MCNC: 0.2 MG/DL
BILIRUBIN URINE: NEGATIVE
BLOOD URINE: NEGATIVE
BUN SERPL-MCNC: 43 MG/DL
CALCIUM SERPL-MCNC: 8.9 MG/DL
CHLORIDE SERPL-SCNC: 95 MMOL/L
CHOLEST SERPL-MCNC: 209 MG/DL
CHOLEST/HDLC SERPL: 6.3 RATIO
CO2 SERPL-SCNC: 22 MMOL/L
COLOR: YELLOW
CREAT SERPL-MCNC: 1.97 MG/DL
GLUCOSE QUALITATIVE U: 1000 MG/DL
GLUCOSE SERPL-MCNC: 582 MG/DL
HDLC SERPL-MCNC: 33 MG/DL
KETONES URINE: NEGATIVE
LDLC SERPL CALC-MCNC: NORMAL
LEUKOCYTE ESTERASE URINE: NEGATIVE
NITRITE URINE: NEGATIVE
PH URINE: 5.5
POTASSIUM SERPL-SCNC: 4.1 MMOL/L
PROT SERPL-MCNC: 6.8 G/DL
PROTEIN URINE: 30 MG/DL
SODIUM SERPL-SCNC: 133 MMOL/L
SPECIFIC GRAVITY URINE: 1.02
TRIGL SERPL-MCNC: 601 MG/DL
TSH SERPL-ACNC: 2.68 UIU/ML
UROBILINOGEN URINE: NEGATIVE MG/DL
VIT B12 SERPL-MCNC: 402 PG/ML

## 2018-01-09 ENCOUNTER — LABORATORY RESULT (OUTPATIENT)
Age: 58
End: 2018-01-09

## 2018-01-09 ENCOUNTER — APPOINTMENT (OUTPATIENT)
Dept: NEPHROLOGY | Facility: CLINIC | Age: 58
End: 2018-01-09
Payer: COMMERCIAL

## 2018-01-09 VITALS
WEIGHT: 186.29 LBS | SYSTOLIC BLOOD PRESSURE: 174 MMHG | BODY MASS INDEX: 29.94 KG/M2 | OXYGEN SATURATION: 98 % | DIASTOLIC BLOOD PRESSURE: 84 MMHG | HEART RATE: 82 BPM | HEIGHT: 66 IN

## 2018-01-09 VITALS — SYSTOLIC BLOOD PRESSURE: 152 MMHG | DIASTOLIC BLOOD PRESSURE: 86 MMHG

## 2018-01-09 DIAGNOSIS — Z86.19 PERSONAL HISTORY OF OTHER INFECTIOUS AND PARASITIC DISEASES: ICD-10-CM

## 2018-01-09 DIAGNOSIS — Z86.79 PERSONAL HISTORY OF OTHER DISEASES OF THE CIRCULATORY SYSTEM: ICD-10-CM

## 2018-01-09 DIAGNOSIS — Z82.49 FAMILY HISTORY OF ISCHEMIC HEART DISEASE AND OTHER DISEASES OF THE CIRCULATORY SYSTEM: ICD-10-CM

## 2018-01-09 DIAGNOSIS — Z87.442 PERSONAL HISTORY OF URINARY CALCULI: ICD-10-CM

## 2018-01-09 PROCEDURE — 99244 OFF/OP CNSLTJ NEW/EST MOD 40: CPT

## 2018-01-09 RX ORDER — CARVEDILOL 12.5 MG/1
12.5 TABLET, FILM COATED ORAL
Qty: 180 | Refills: 0 | Status: DISCONTINUED | COMMUNITY
Start: 2017-12-27 | End: 2018-01-09

## 2018-01-09 RX ORDER — HYDRALAZINE HYDROCHLORIDE 25 MG/1
25 TABLET ORAL
Qty: 90 | Refills: 0 | Status: DISCONTINUED | COMMUNITY
Start: 2017-05-17 | End: 2018-01-09

## 2018-01-09 RX ORDER — HYDRALAZINE HYDROCHLORIDE 50 MG/1
50 TABLET ORAL
Qty: 90 | Refills: 0 | Status: DISCONTINUED | COMMUNITY
Start: 2017-05-22 | End: 2018-01-09

## 2018-01-10 ENCOUNTER — TRANSCRIPTION ENCOUNTER (OUTPATIENT)
Age: 58
End: 2018-01-10

## 2018-01-10 ENCOUNTER — RX RENEWAL (OUTPATIENT)
Age: 58
End: 2018-01-10

## 2018-01-10 ENCOUNTER — RESULT REVIEW (OUTPATIENT)
Age: 58
End: 2018-01-10

## 2018-01-10 LAB
ALBUMIN SERPL ELPH-MCNC: 3.7 G/DL
ANION GAP SERPL CALC-SCNC: 14 MMOL/L
APPEARANCE: CLEAR
BACTERIA: NEGATIVE
BILIRUBIN URINE: NEGATIVE
BLOOD URINE: ABNORMAL
BUN SERPL-MCNC: 26 MG/DL
CALCIUM SERPL-MCNC: 9.6 MG/DL
CHLORIDE SERPL-SCNC: 101 MMOL/L
CO2 SERPL-SCNC: 25 MMOL/L
COLOR: YELLOW
CREAT SERPL-MCNC: 1.45 MG/DL
CREAT SPEC-SCNC: 45 MG/DL
CREAT/PROT UR: 4.4 RATIO
GLUCOSE QUALITATIVE U: 1000 MG/DL
GLUCOSE SERPL-MCNC: 221 MG/DL
HYALINE CASTS: 1 /LPF
KETONES URINE: NEGATIVE
LEUKOCYTE ESTERASE URINE: NEGATIVE
MICROSCOPIC-UA: NORMAL
MPO AB + PR3 PNL SER: NORMAL
NITRITE URINE: NEGATIVE
PH URINE: 5
PHOSPHATE SERPL-MCNC: 3.2 MG/DL
POTASSIUM SERPL-SCNC: 4.2 MMOL/L
PROT UR-MCNC: 199 MG/DL
PROTEIN URINE: 300 MG/DL
RED BLOOD CELLS URINE: 4 /HPF
SODIUM SERPL-SCNC: 140 MMOL/L
SPECIFIC GRAVITY URINE: 1.02
SQUAMOUS EPITHELIAL CELLS: 1 /HPF
UROBILINOGEN URINE: NEGATIVE MG/DL
WHITE BLOOD CELLS URINE: 1 /HPF

## 2018-01-11 ENCOUNTER — RX RENEWAL (OUTPATIENT)
Age: 58
End: 2018-01-11

## 2018-01-12 LAB
ANA PAT FLD IF-IMP: ABNORMAL
ANA SER IF-ACNC: ABNORMAL
C3 SERPL-MCNC: 137 MG/DL
C4 SERPL-MCNC: 37 MG/DL
CRYOGLOB SERPL-MCNC: NEGATIVE
GBM AB TITR SER IF: <0.2 U
HBV SURFACE AB SER QL: NONREACTIVE
HCV AB SER QL: NONREACTIVE
HCV S/CO RATIO: 0.08 S/CO
M PROTEIN SPEC IFE-MCNC: NORMAL

## 2018-01-16 ENCOUNTER — APPOINTMENT (OUTPATIENT)
Dept: ENDOCRINOLOGY | Facility: CLINIC | Age: 58
End: 2018-01-16
Payer: COMMERCIAL

## 2018-01-16 ENCOUNTER — OTHER (OUTPATIENT)
Age: 58
End: 2018-01-16

## 2018-01-16 VITALS
DIASTOLIC BLOOD PRESSURE: 78 MMHG | HEART RATE: 90 BPM | HEIGHT: 66 IN | RESPIRATION RATE: 16 BRPM | SYSTOLIC BLOOD PRESSURE: 150 MMHG | OXYGEN SATURATION: 98 % | BODY MASS INDEX: 29.89 KG/M2 | WEIGHT: 186 LBS

## 2018-01-16 DIAGNOSIS — R76.8 OTHER SPECIFIED ABNORMAL IMMUNOLOGICAL FINDINGS IN SERUM: ICD-10-CM

## 2018-01-16 PROCEDURE — 99244 OFF/OP CNSLTJ NEW/EST MOD 40: CPT

## 2018-01-18 ENCOUNTER — OUTPATIENT (OUTPATIENT)
Dept: OUTPATIENT SERVICES | Facility: HOSPITAL | Age: 58
LOS: 1 days | End: 2018-01-18
Payer: COMMERCIAL

## 2018-01-18 ENCOUNTER — APPOINTMENT (OUTPATIENT)
Dept: ULTRASOUND IMAGING | Facility: IMAGING CENTER | Age: 58
End: 2018-01-18

## 2018-01-18 DIAGNOSIS — I50.9 HEART FAILURE, UNSPECIFIED: Chronic | ICD-10-CM

## 2018-01-18 DIAGNOSIS — Z95.5 PRESENCE OF CORONARY ANGIOPLASTY IMPLANT AND GRAFT: Chronic | ICD-10-CM

## 2018-01-18 DIAGNOSIS — Z98.89 OTHER SPECIFIED POSTPROCEDURAL STATES: Chronic | ICD-10-CM

## 2018-01-18 DIAGNOSIS — N18.3 CHRONIC KIDNEY DISEASE, STAGE 3 (MODERATE): ICD-10-CM

## 2018-01-18 DIAGNOSIS — E11.319 TYPE 2 DIABETES MELLITUS WITH UNSPECIFIED DIABETIC RETINOPATHY WITHOUT MACULAR EDEMA: Chronic | ICD-10-CM

## 2018-01-18 PROCEDURE — 93975 VASCULAR STUDY: CPT | Mod: 26

## 2018-01-18 PROCEDURE — 93975 VASCULAR STUDY: CPT

## 2018-01-29 ENCOUNTER — APPOINTMENT (OUTPATIENT)
Dept: INTERNAL MEDICINE | Facility: CLINIC | Age: 58
End: 2018-01-29
Payer: COMMERCIAL

## 2018-01-29 VITALS
SYSTOLIC BLOOD PRESSURE: 140 MMHG | BODY MASS INDEX: 36.91 KG/M2 | TEMPERATURE: 98.2 F | HEIGHT: 60 IN | WEIGHT: 188 LBS | OXYGEN SATURATION: 98 % | HEART RATE: 81 BPM | DIASTOLIC BLOOD PRESSURE: 68 MMHG

## 2018-01-29 DIAGNOSIS — M62.838 OTHER MUSCLE SPASM: ICD-10-CM

## 2018-01-29 DIAGNOSIS — M25.511 PAIN IN RIGHT SHOULDER: ICD-10-CM

## 2018-01-29 PROCEDURE — 99213 OFFICE O/P EST LOW 20 MIN: CPT

## 2018-01-29 RX ORDER — AMMONIUM LACTATE 12 %
12 CREAM (GRAM) TOPICAL
Qty: 140 | Refills: 0 | Status: DISCONTINUED | COMMUNITY
Start: 2018-01-16

## 2018-01-29 RX ORDER — SYRINGE AND NEEDLE,INSULIN,1ML 28GX1/2"
30G X 1/2" SYRINGE, EMPTY DISPOSABLE MISCELLANEOUS
Qty: 300 | Refills: 0 | Status: DISCONTINUED | COMMUNITY
Start: 2018-01-16

## 2018-02-26 ENCOUNTER — APPOINTMENT (OUTPATIENT)
Dept: CHRONIC DISEASE MANAGEMENT | Facility: CLINIC | Age: 58
End: 2018-02-26

## 2018-03-05 ENCOUNTER — MEDICATION RENEWAL (OUTPATIENT)
Age: 58
End: 2018-03-05

## 2018-03-06 ENCOUNTER — RX RENEWAL (OUTPATIENT)
Age: 58
End: 2018-03-06

## 2018-03-26 ENCOUNTER — RX RENEWAL (OUTPATIENT)
Age: 58
End: 2018-03-26

## 2018-04-10 ENCOUNTER — APPOINTMENT (OUTPATIENT)
Dept: ENDOCRINOLOGY | Facility: CLINIC | Age: 58
End: 2018-04-10

## 2018-04-12 ENCOUNTER — INPATIENT (INPATIENT)
Facility: HOSPITAL | Age: 58
LOS: 0 days | Discharge: HOME | End: 2018-04-13
Attending: HOSPITALIST | Admitting: HOSPITALIST
Payer: COMMERCIAL

## 2018-04-12 VITALS
SYSTOLIC BLOOD PRESSURE: 185 MMHG | RESPIRATION RATE: 18 BRPM | TEMPERATURE: 102 F | OXYGEN SATURATION: 98 % | HEART RATE: 93 BPM | DIASTOLIC BLOOD PRESSURE: 99 MMHG

## 2018-04-12 DIAGNOSIS — Z98.89 OTHER SPECIFIED POSTPROCEDURAL STATES: Chronic | ICD-10-CM

## 2018-04-12 DIAGNOSIS — M19.90 UNSPECIFIED OSTEOARTHRITIS, UNSPECIFIED SITE: ICD-10-CM

## 2018-04-12 DIAGNOSIS — Z95.5 PRESENCE OF CORONARY ANGIOPLASTY IMPLANT AND GRAFT: Chronic | ICD-10-CM

## 2018-04-12 DIAGNOSIS — I25.10 ATHEROSCLEROTIC HEART DISEASE OF NATIVE CORONARY ARTERY WITHOUT ANGINA PECTORIS: ICD-10-CM

## 2018-04-12 DIAGNOSIS — I50.22 CHRONIC SYSTOLIC (CONGESTIVE) HEART FAILURE: ICD-10-CM

## 2018-04-12 DIAGNOSIS — E11.319 TYPE 2 DIABETES MELLITUS WITH UNSPECIFIED DIABETIC RETINOPATHY WITHOUT MACULAR EDEMA: Chronic | ICD-10-CM

## 2018-04-12 DIAGNOSIS — I50.9 HEART FAILURE, UNSPECIFIED: Chronic | ICD-10-CM

## 2018-04-12 DIAGNOSIS — E11.69 TYPE 2 DIABETES MELLITUS WITH OTHER SPECIFIED COMPLICATION: ICD-10-CM

## 2018-04-12 DIAGNOSIS — N18.3 CHRONIC KIDNEY DISEASE, STAGE 3 (MODERATE): ICD-10-CM

## 2018-04-12 DIAGNOSIS — Z29.9 ENCOUNTER FOR PROPHYLACTIC MEASURES, UNSPECIFIED: ICD-10-CM

## 2018-04-12 DIAGNOSIS — E78.5 HYPERLIPIDEMIA, UNSPECIFIED: ICD-10-CM

## 2018-04-12 DIAGNOSIS — M10.9 GOUT, UNSPECIFIED: ICD-10-CM

## 2018-04-12 DIAGNOSIS — I10 ESSENTIAL (PRIMARY) HYPERTENSION: ICD-10-CM

## 2018-04-12 LAB
ALBUMIN SERPL ELPH-MCNC: 3.3 G/DL — SIGNIFICANT CHANGE UP (ref 3.3–5)
ALP SERPL-CCNC: 184 U/L — HIGH (ref 40–120)
ALT FLD-CCNC: 32 U/L — SIGNIFICANT CHANGE UP (ref 4–41)
APTT BLD: 35.6 SEC — SIGNIFICANT CHANGE UP (ref 27.5–37.4)
AST SERPL-CCNC: 50 U/L — HIGH (ref 4–40)
BASE EXCESS BLDV CALC-SCNC: 3.8 MMOL/L — SIGNIFICANT CHANGE UP
BASOPHILS # BLD AUTO: 0.02 K/UL — SIGNIFICANT CHANGE UP (ref 0–0.2)
BASOPHILS NFR BLD AUTO: 0.2 % — SIGNIFICANT CHANGE UP (ref 0–2)
BILIRUB SERPL-MCNC: 0.6 MG/DL — SIGNIFICANT CHANGE UP (ref 0.2–1.2)
BLOOD GAS VENOUS - CREATININE: 1.6 MG/DL — HIGH (ref 0.5–1.3)
BUN SERPL-MCNC: 24 MG/DL — HIGH (ref 7–23)
CALCIUM SERPL-MCNC: 9.1 MG/DL — SIGNIFICANT CHANGE UP (ref 8.4–10.5)
CHLORIDE BLDV-SCNC: 98 MMOL/L — SIGNIFICANT CHANGE UP (ref 96–108)
CHLORIDE SERPL-SCNC: 94 MMOL/L — LOW (ref 98–107)
CO2 SERPL-SCNC: 25 MMOL/L — SIGNIFICANT CHANGE UP (ref 22–31)
CREAT SERPL-MCNC: 1.71 MG/DL — HIGH (ref 0.5–1.3)
CRP SERPL-MCNC: 323.6 MG/L — HIGH
EOSINOPHIL # BLD AUTO: 0 K/UL — SIGNIFICANT CHANGE UP (ref 0–0.5)
EOSINOPHIL NFR BLD AUTO: 0 % — SIGNIFICANT CHANGE UP (ref 0–6)
ERYTHROCYTE [SEDIMENTATION RATE] IN BLOOD: 104 MM/HR — HIGH (ref 1–15)
GAS PNL BLDV: 134 MMOL/L — LOW (ref 136–146)
GLUCOSE BLDV-MCNC: 123 — HIGH (ref 70–99)
GLUCOSE SERPL-MCNC: 121 MG/DL — HIGH (ref 70–99)
HCO3 BLDV-SCNC: 27 MMOL/L — SIGNIFICANT CHANGE UP (ref 20–27)
HCT VFR BLD CALC: 33.1 % — LOW (ref 39–50)
HCT VFR BLDV CALC: 34.3 % — LOW (ref 39–51)
HGB BLD-MCNC: 10.8 G/DL — LOW (ref 13–17)
HGB BLDV-MCNC: 11.1 G/DL — LOW (ref 13–17)
IMM GRANULOCYTES # BLD AUTO: 0.08 # — SIGNIFICANT CHANGE UP
IMM GRANULOCYTES NFR BLD AUTO: 0.7 % — SIGNIFICANT CHANGE UP (ref 0–1.5)
INR BLD: 1.22 — HIGH (ref 0.88–1.17)
LACTATE BLDV-MCNC: 1.4 MMOL/L — SIGNIFICANT CHANGE UP (ref 0.5–2)
LYMPHOCYTES # BLD AUTO: 0.81 K/UL — LOW (ref 1–3.3)
LYMPHOCYTES # BLD AUTO: 6.6 % — LOW (ref 13–44)
MCHC RBC-ENTMCNC: 27.7 PG — SIGNIFICANT CHANGE UP (ref 27–34)
MCHC RBC-ENTMCNC: 32.6 % — SIGNIFICANT CHANGE UP (ref 32–36)
MCV RBC AUTO: 84.9 FL — SIGNIFICANT CHANGE UP (ref 80–100)
MONOCYTES # BLD AUTO: 1.1 K/UL — HIGH (ref 0–0.9)
MONOCYTES NFR BLD AUTO: 9 % — SIGNIFICANT CHANGE UP (ref 2–14)
NEUTROPHILS # BLD AUTO: 10.23 K/UL — HIGH (ref 1.8–7.4)
NEUTROPHILS NFR BLD AUTO: 83.5 % — HIGH (ref 43–77)
NRBC # FLD: 0 — SIGNIFICANT CHANGE UP
PCO2 BLDV: 39 MMHG — LOW (ref 41–51)
PH BLDV: 7.46 PH — HIGH (ref 7.32–7.43)
PLATELET # BLD AUTO: 264 K/UL — SIGNIFICANT CHANGE UP (ref 150–400)
PMV BLD: 9.7 FL — SIGNIFICANT CHANGE UP (ref 7–13)
PO2 BLDV: 27 MMHG — LOW (ref 35–40)
POTASSIUM BLDV-SCNC: 4 MMOL/L — SIGNIFICANT CHANGE UP (ref 3.4–4.5)
POTASSIUM SERPL-MCNC: 3.9 MMOL/L — SIGNIFICANT CHANGE UP (ref 3.5–5.3)
POTASSIUM SERPL-SCNC: 3.9 MMOL/L — SIGNIFICANT CHANGE UP (ref 3.5–5.3)
PROT SERPL-MCNC: 8.1 G/DL — SIGNIFICANT CHANGE UP (ref 6–8.3)
PROTHROM AB SERPL-ACNC: 13.6 SEC — HIGH (ref 9.8–13.1)
RBC # BLD: 3.9 M/UL — LOW (ref 4.2–5.8)
RBC # FLD: 13.1 % — SIGNIFICANT CHANGE UP (ref 10.3–14.5)
SAO2 % BLDV: 48.5 % — LOW (ref 60–85)
SODIUM SERPL-SCNC: 135 MMOL/L — SIGNIFICANT CHANGE UP (ref 135–145)
URATE SERPL-MCNC: 10.3 MG/DL — HIGH (ref 3.4–8.8)
WBC # BLD: 12.24 K/UL — HIGH (ref 3.8–10.5)
WBC # FLD AUTO: 12.24 K/UL — HIGH (ref 3.8–10.5)

## 2018-04-12 PROCEDURE — 71045 X-RAY EXAM CHEST 1 VIEW: CPT | Mod: 26

## 2018-04-12 PROCEDURE — 99223 1ST HOSP IP/OBS HIGH 75: CPT

## 2018-04-12 PROCEDURE — 73620 X-RAY EXAM OF FOOT: CPT | Mod: 26,50

## 2018-04-12 PROCEDURE — 73610 X-RAY EXAM OF ANKLE: CPT | Mod: 26,50

## 2018-04-12 RX ORDER — INSULIN LISPRO 100/ML
VIAL (ML) SUBCUTANEOUS
Qty: 0 | Refills: 0 | Status: DISCONTINUED | OUTPATIENT
Start: 2018-04-12 | End: 2018-04-13

## 2018-04-12 RX ORDER — GLUCAGON INJECTION, SOLUTION 0.5 MG/.1ML
1 INJECTION, SOLUTION SUBCUTANEOUS ONCE
Qty: 0 | Refills: 0 | Status: DISCONTINUED | OUTPATIENT
Start: 2018-04-12 | End: 2018-04-13

## 2018-04-12 RX ORDER — INSULIN LISPRO 100/ML
6 VIAL (ML) SUBCUTANEOUS
Qty: 0 | Refills: 0 | Status: DISCONTINUED | OUTPATIENT
Start: 2018-04-12 | End: 2018-04-13

## 2018-04-12 RX ORDER — CARVEDILOL PHOSPHATE 80 MG/1
12.5 CAPSULE, EXTENDED RELEASE ORAL EVERY 12 HOURS
Qty: 0 | Refills: 0 | Status: DISCONTINUED | OUTPATIENT
Start: 2018-04-12 | End: 2018-04-13

## 2018-04-12 RX ORDER — DEXTROSE 50 % IN WATER 50 %
25 SYRINGE (ML) INTRAVENOUS ONCE
Qty: 0 | Refills: 0 | Status: DISCONTINUED | OUTPATIENT
Start: 2018-04-12 | End: 2018-04-13

## 2018-04-12 RX ORDER — PIPERACILLIN AND TAZOBACTAM 4; .5 G/20ML; G/20ML
3.38 INJECTION, POWDER, LYOPHILIZED, FOR SOLUTION INTRAVENOUS ONCE
Qty: 0 | Refills: 0 | Status: COMPLETED | OUTPATIENT
Start: 2018-04-12 | End: 2018-04-12

## 2018-04-12 RX ORDER — ALLOPURINOL 300 MG
300 TABLET ORAL DAILY
Qty: 0 | Refills: 0 | Status: DISCONTINUED | OUTPATIENT
Start: 2018-04-12 | End: 2018-04-13

## 2018-04-12 RX ORDER — INSULIN LISPRO 100/ML
VIAL (ML) SUBCUTANEOUS AT BEDTIME
Qty: 0 | Refills: 0 | Status: DISCONTINUED | OUTPATIENT
Start: 2018-04-12 | End: 2018-04-13

## 2018-04-12 RX ORDER — HYDRALAZINE HCL 50 MG
75 TABLET ORAL THREE TIMES A DAY
Qty: 0 | Refills: 0 | Status: DISCONTINUED | OUTPATIENT
Start: 2018-04-12 | End: 2018-04-13

## 2018-04-12 RX ORDER — COLCHICINE 0.6 MG
0.6 TABLET ORAL DAILY
Qty: 0 | Refills: 0 | Status: DISCONTINUED | OUTPATIENT
Start: 2018-04-12 | End: 2018-04-13

## 2018-04-12 RX ORDER — PANTOPRAZOLE SODIUM 20 MG/1
40 TABLET, DELAYED RELEASE ORAL
Qty: 0 | Refills: 0 | Status: DISCONTINUED | OUTPATIENT
Start: 2018-04-12 | End: 2018-04-13

## 2018-04-12 RX ORDER — TAMSULOSIN HYDROCHLORIDE 0.4 MG/1
0.4 CAPSULE ORAL DAILY
Qty: 0 | Refills: 0 | Status: DISCONTINUED | OUTPATIENT
Start: 2018-04-12 | End: 2018-04-13

## 2018-04-12 RX ORDER — DEXTROSE 50 % IN WATER 50 %
1 SYRINGE (ML) INTRAVENOUS ONCE
Qty: 0 | Refills: 0 | Status: DISCONTINUED | OUTPATIENT
Start: 2018-04-12 | End: 2018-04-13

## 2018-04-12 RX ORDER — ISOSORBIDE DINITRATE 5 MG/1
20 TABLET ORAL THREE TIMES A DAY
Qty: 0 | Refills: 0 | Status: DISCONTINUED | OUTPATIENT
Start: 2018-04-12 | End: 2018-04-13

## 2018-04-12 RX ORDER — COLCHICINE 0.6 MG
1.2 TABLET ORAL ONCE
Qty: 0 | Refills: 0 | Status: COMPLETED | OUTPATIENT
Start: 2018-04-12 | End: 2018-04-12

## 2018-04-12 RX ORDER — DEXTROSE 50 % IN WATER 50 %
12.5 SYRINGE (ML) INTRAVENOUS ONCE
Qty: 0 | Refills: 0 | Status: DISCONTINUED | OUTPATIENT
Start: 2018-04-12 | End: 2018-04-13

## 2018-04-12 RX ORDER — TICAGRELOR 90 MG/1
90 TABLET ORAL
Qty: 0 | Refills: 0 | Status: DISCONTINUED | OUTPATIENT
Start: 2018-04-12 | End: 2018-04-13

## 2018-04-12 RX ORDER — INSULIN DETEMIR 100/ML (3)
40 INSULIN PEN (ML) SUBCUTANEOUS AT BEDTIME
Qty: 0 | Refills: 0 | Status: DISCONTINUED | OUTPATIENT
Start: 2018-04-12 | End: 2018-04-13

## 2018-04-12 RX ORDER — TICAGRELOR 90 MG/1
1 TABLET ORAL
Qty: 0 | Refills: 0 | COMMUNITY

## 2018-04-12 RX ORDER — ACETAMINOPHEN 500 MG
1000 TABLET ORAL ONCE
Qty: 0 | Refills: 0 | Status: COMPLETED | OUTPATIENT
Start: 2018-04-12 | End: 2018-04-12

## 2018-04-12 RX ORDER — OXYCODONE HYDROCHLORIDE 5 MG/1
5 TABLET ORAL ONCE
Qty: 0 | Refills: 0 | Status: DISCONTINUED | OUTPATIENT
Start: 2018-04-12 | End: 2018-04-12

## 2018-04-12 RX ORDER — FUROSEMIDE 40 MG
1 TABLET ORAL
Qty: 0 | Refills: 0 | COMMUNITY

## 2018-04-12 RX ORDER — GABAPENTIN 400 MG/1
300 CAPSULE ORAL THREE TIMES A DAY
Qty: 0 | Refills: 0 | Status: DISCONTINUED | OUTPATIENT
Start: 2018-04-12 | End: 2018-04-13

## 2018-04-12 RX ORDER — ATORVASTATIN CALCIUM 80 MG/1
80 TABLET, FILM COATED ORAL AT BEDTIME
Qty: 0 | Refills: 0 | Status: DISCONTINUED | OUTPATIENT
Start: 2018-04-12 | End: 2018-04-13

## 2018-04-12 RX ORDER — ASPIRIN/CALCIUM CARB/MAGNESIUM 324 MG
81 TABLET ORAL DAILY
Qty: 0 | Refills: 0 | Status: DISCONTINUED | OUTPATIENT
Start: 2018-04-12 | End: 2018-04-13

## 2018-04-12 RX ORDER — SODIUM CHLORIDE 9 MG/ML
1000 INJECTION, SOLUTION INTRAVENOUS
Qty: 0 | Refills: 0 | Status: DISCONTINUED | OUTPATIENT
Start: 2018-04-12 | End: 2018-04-13

## 2018-04-12 RX ORDER — SODIUM CHLORIDE 9 MG/ML
1000 INJECTION INTRAMUSCULAR; INTRAVENOUS; SUBCUTANEOUS ONCE
Qty: 0 | Refills: 0 | Status: COMPLETED | OUTPATIENT
Start: 2018-04-12 | End: 2018-04-12

## 2018-04-12 RX ORDER — VANCOMYCIN HCL 1 G
1000 VIAL (EA) INTRAVENOUS ONCE
Qty: 0 | Refills: 0 | Status: COMPLETED | OUTPATIENT
Start: 2018-04-12 | End: 2018-04-12

## 2018-04-12 RX ADMIN — ATORVASTATIN CALCIUM 80 MILLIGRAM(S): 80 TABLET, FILM COATED ORAL at 22:02

## 2018-04-12 RX ADMIN — Medication 2: at 22:03

## 2018-04-12 RX ADMIN — SODIUM CHLORIDE 1000 MILLILITER(S): 9 INJECTION INTRAMUSCULAR; INTRAVENOUS; SUBCUTANEOUS at 12:18

## 2018-04-12 RX ADMIN — PANTOPRAZOLE SODIUM 40 MILLIGRAM(S): 20 TABLET, DELAYED RELEASE ORAL at 18:54

## 2018-04-12 RX ADMIN — Medication 250 MILLIGRAM(S): at 13:41

## 2018-04-12 RX ADMIN — Medication 0.6 MILLIGRAM(S): at 22:00

## 2018-04-12 RX ADMIN — TAMSULOSIN HYDROCHLORIDE 0.4 MILLIGRAM(S): 0.4 CAPSULE ORAL at 22:02

## 2018-04-12 RX ADMIN — GABAPENTIN 300 MILLIGRAM(S): 400 CAPSULE ORAL at 22:02

## 2018-04-12 RX ADMIN — ISOSORBIDE DINITRATE 20 MILLIGRAM(S): 5 TABLET ORAL at 22:29

## 2018-04-12 RX ADMIN — TICAGRELOR 90 MILLIGRAM(S): 90 TABLET ORAL at 22:02

## 2018-04-12 RX ADMIN — Medication 81 MILLIGRAM(S): at 18:54

## 2018-04-12 RX ADMIN — PIPERACILLIN AND TAZOBACTAM 200 GRAM(S): 4; .5 INJECTION, POWDER, LYOPHILIZED, FOR SOLUTION INTRAVENOUS at 13:02

## 2018-04-12 RX ADMIN — Medication 40 MILLIGRAM(S): at 12:18

## 2018-04-12 RX ADMIN — OXYCODONE HYDROCHLORIDE 5 MILLIGRAM(S): 5 TABLET ORAL at 13:16

## 2018-04-12 RX ADMIN — CARVEDILOL PHOSPHATE 12.5 MILLIGRAM(S): 80 CAPSULE, EXTENDED RELEASE ORAL at 18:54

## 2018-04-12 RX ADMIN — Medication 6: at 18:28

## 2018-04-12 RX ADMIN — Medication 75 MILLIGRAM(S): at 22:02

## 2018-04-12 RX ADMIN — Medication 300 MILLIGRAM(S): at 18:54

## 2018-04-12 RX ADMIN — Medication 1.2 MILLIGRAM(S): at 22:00

## 2018-04-12 RX ADMIN — Medication 400 MILLIGRAM(S): at 12:18

## 2018-04-12 RX ADMIN — Medication 40 UNIT(S): at 22:09

## 2018-04-12 NOTE — H&P ADULT - PROBLEM SELECTOR PROBLEM 2
Chronic kidney disease (CKD), stage III (moderate) Type 2 diabetes mellitus with other specified complication, with long-term current use of insulin

## 2018-04-12 NOTE — ED PROVIDER NOTE - ATTENDING CONTRIBUTION TO CARE
58M h/o DM, HTN, CKD, CAD s/p stents, HLD, gout presents with b/l foot pain.  Reports pain progressively worsening over the last week in both feet and ankles.  Decreased appetite for several days, + nausea.  Today unable to ambulate 2/2 pain.  Found to be febrile in triage.  No cough, no abd pain, no v/d. Has had similar pain several years ago but not as severe.  On exam nad, lungs clear, rrr, abd soft, 2+ pulses, trace b/l foot edema, erythema over b/l MTP, scattered over dorsal foot, able to range b/l ankles, but with some pain, speech clear, no focal neuro deficits. Seen by podiatry, thought to have inflammatory arthritis, unlikely septic arthritis. Unable to ambulate, plan for admission to medicine.

## 2018-04-12 NOTE — H&P ADULT - NSHPSOCIALHISTORY_GEN_ALL_CORE
Patient lives at home with wife and 3 children. Never smoked, drinks rarely. No history of drug use.

## 2018-04-12 NOTE — H&P ADULT - PROBLEM SELECTOR PLAN 1
- patient presenting with b/l ankle swelling and pain, R>L, happens yearly  - labs sig for elevated ESR, CRP  - had fever and leukocytosis on admission however does not appear toxic and joint does not appear infected at this time  - xrays as above, no evidence of osteo - patient presenting with b/l ankle swelling and pain, R>L, happens yearly  - labs sig for elevated ESR, CRP, elevated UA, leukocytosis  - had fever on admission however does not appear toxic and joint does not appear infected at this time  - xrays as above, no evidence of osteo, nonspecific subcortical cyst  - podiatry consulted in ED - likely inflammatory, rec colchicine and antiinflammatories  - s/p 1 dose of prednisone in ED, will start colchicine and continue with allopurinol renally dosed, will add steroid taper in AM if no improvement - patient presenting with b/l ankle swelling and pain, R>L, happens yearly  - labs sig for elevated ESR, CRP, elevated UA, leukocytosis  - had fever on admission however does not appear toxic and joint does not appear infected at this time, will monitor off antibiotics  - xrays as above, no evidence of osteo, nonspecific subcortical cyst  - podiatry consulted in ED - likely inflammatory, rec colchicine and antiinflammatories  - s/p 1 dose of prednisone in ED, will start colchicine and continue with allopurinol renally dosed, will add steroid taper in AM if no improvement Suspect gout flare  - patient presenting with b/l ankle swelling and pain, R>L, happens yearly  - labs sig for elevated ESR, CRP, elevated UA, leukocytosis  - had fever on admission however does not appear toxic and joint does not appear infected at this time, will monitor off antibiotics  - xrays as above, no evidence of osteo, nonspecific subcortical cyst  - podiatry recs appreciated   - s/p 1 dose of prednisone in ED, will start colchicine for acute flare and continue with allopurinol renally dosed. Will consider steroid taper if no improvement but would rather avoid systemic steroids if possible.

## 2018-04-12 NOTE — H&P ADULT - HISTORY OF PRESENT ILLNESS
58M with uncontrolled DM2 with neuropathy, nephropathy and retinopathy (last A1C 12.0 in 12/2017), CKD stage 3, gout, CHF with cardioMEMS, HTN, HLD, CAD s/p 4 stents presenting with 1 week of b/l LE swelling and pain. Patient states it started last Thursday and he originally had to walk with a cane. It slowly progressed to him having to walk with a walker and then unable to ambulate at all. He states the pain is a throbbing and tried taking tylenol however this did not help. He did not try NSAIDs because he states his nephrologist told him to avoid them. He denies any fevers, chills, NS, weight changes, abdominal pain, CP, SOB, palpitations. Patient reports 1 episode of vomiting in ambulance on the way to hospital. He also reports no appetite in the past 3 days and therefore has not been eating. He reports this swelling in his legs happens once a year and reports no inciting events that he can recall. He reports he does not take anything for gout.     In ED vitals include 185/99, HR 93, 101.6, 18-98%RA. Labs significant for ESR of 104, .6, uric acid of 10.3, Creatinine 1.74, WBC 12.24.

## 2018-04-12 NOTE — CONSULT NOTE ADULT - ASSESSMENT
59yo M w/ b/l likely gouty exacerbation or inflammatory arthropathy, unlikely infectious etiology  ·	Pt seen and evaluated  ·	Pt has hx of gout, multiple joint complaints (digits, diffusely multi-joint in foot)  ·	Pt relays pain is similar to previously experienced attacks that occur 1-2x/yr to which he seeks ED for treatment, placed on meds and leaves  ·	No clinical sign of acute infection at foot at this time, likely edema and mild erythema 2/2 inflammatory etiology (gout, RA, seroneg, etc)  ·	Recommend po colchicine (discussed w/ ED Cr not optimal) or continued anti-inflammatories  ·	Suggest formal rheum w/u, pt wants new doctor (either if admitted or outpatient)  ·	Recommend monitoring for improvement in pain, CDU admission if warranted to assure improvement prior to d/c  ·	May require further F w/u  ·	XR reviewed, findings at MPJ level c/w RA changes wish washing out & radiolucency b/l diffusely (particularly medial), subchondral cyst formation noted at 1st MPJ b/l medially  ·	d/w attending

## 2018-04-12 NOTE — H&P ADULT - NSHPREVIEWOFSYSTEMS_GEN_ALL_CORE
CONSTITUTIONAL:  No weight loss, fever, chills, weakness or fatigue.  HEENT:  Eyes:  No visual loss, blurred vision, double vision or yellow sclerae.   Ears, Nose, Throat:  No hearing loss, sneezing, congestion, runny nose or dysphagia.  SKIN:  No rash or itching.  CARDIOVASCULAR:  No chest pain, chest pressure or chest discomfort. No palpitations.  RESPIRATORY:  No shortness of breath, cough or sputum.  GASTROINTESTINAL:  see HPI  GENITOURINARY:  No hematuria, dysuria.   NEUROLOGICAL:  No headache, dizziness, syncope, numbness or tingling in the extremities. No change in bowel or bladder control.  MUSCULOSKELETAL:  + joint pain and stiffness in left middle finger, see HPI  HEMATOLOGIC:  No anemia, bleeding or bruising.  LYMPHATICS:  No enlarged nodes. No history of splenectomy.  PSYCHIATRIC:  No history of depression or anxiety.  ENDOCRINOLOGIC:  No reports of sweating, cold or heat intolerance. No polyuria or polydipsia.  ALLERGIES:  No history of asthma, hives, eczema or rhinitis.

## 2018-04-12 NOTE — H&P ADULT - PROBLEM SELECTOR PLAN 5
- last TTE in May 2017 with EF 46%, moderate segmental LVSD, cardiomems in place  - appears euvolemic on exam, no crackles or edema aside from ankle swelling (nonpitting)  - c/w hydralazine, isordil, carvedilol  - holding lasix in setting of possible acute gout flare  - monitor I/Os, daily weights - last TTE in May 2017 with EF 46%, moderate segmental LVSD, cardiomems in place  - appears euvolemic on exam, no crackles or edema aside from ankle swelling (nonpitting) and pt able to lie flat  - c/w hydralazine, isordil, carvedilol  - holding lasix in setting of possible acute gout flare  - monitor I/Os, daily weights

## 2018-04-12 NOTE — H&P ADULT - PROBLEM SELECTOR PLAN 4
- BP elevated on admission, history of uncontrolled HTN  - will c/w home dose hydralazine, isordil with hold parameters  - will hold lasix in setting of possible acute gout flare, no evidence of fluid overload on exam - BP elevated on admission, history of uncontrolled HTN. Pain likely also contributing   - will c/w home dose coreg, hydralazine, isordil with hold parameters  - will hold lasix in setting of possible acute gout flare, no evidence of fluid overload on exam

## 2018-04-12 NOTE — H&P ADULT - PROBLEM SELECTOR PLAN 2
- uncontrolled with nephropathy, neuropathy and retinopathy  - last A1C 12.0 in December  - will check A1C in AM  - will start insulin at lower dose while in admitted, levemir 40U, lispro 6U and ISS, will uptitrate as needed, FS AC QHS  - consistent carb diet - uncontrolled c/w nephropathy, neuropathy and retinopathy  - last A1C 12.0 in December  - will check A1C in AM  - will start insulin at lower dose while in admitted, levemir 40U, lispro 6U and ISS, will uptitrate as needed, FS AC QHS  - consistent carb diet

## 2018-04-12 NOTE — ED PROVIDER NOTE - OBJECTIVE STATEMENT
57 YOM DM, HTN, CAD/stents, ICM, gout, CKD, hepatitis, p/w 57 YOM DM, HTN, CAD/stents, ICM, s/p cardiomems monitor placement, gout, CKD, hepatitis, p/w B/L foot swelling and pain for the past week.  Associated progressively worsening erythema, poor PO intake the past 4 days, and 2 episodes of emesis today. Pt decided to come ot the ED today because of decreased ROM secondary to pain and difficulty ambulating.  Pt denied fevers and chills, however he is noted to be febrile in triage.  No trauma, ulceration.  Pain similar to last gout exacerbation however it hs not gone away.

## 2018-04-12 NOTE — H&P ADULT - ASSESSMENT
58M with uncontrolled DM2 with neuropathy, nephropathy and retinopathy (last A1C 12.0 in 12/2017), CKD stage 3, gout, CHF with cardioMEMS, HTN, HLD, CAD s/p 4 stents presenting with 1 week of b/l LE swelling and pain. 58M with uncontrolled DM2 with neuropathy, nephropathy and retinopathy (last A1C 12.0 in 12/2017), CKD stage 3, gout, CHF with cardioMEMS, HTN, HLD, CAD s/p 4 stents presenting with 1 week of b/l LE swelling and pain likely 2/2 inflammatory process. 58M with uncontrolled DM2 with neuropathy, nephropathy and retinopathy (last A1C 12.0 in 12/2017), CKD stage 3, gout, CHF with cardioMEMS, HTN, HLD, CAD s/p 4 stents presenting with 1 week of b/l LE swelling and pain found to meet SIRS criteria (fever, HR> 90, leukocytosis suspected 2/2 gout flare.

## 2018-04-12 NOTE — ED ADULT TRIAGE NOTE - CHIEF COMPLAINT QUOTE
Arrives with ems from home, diabetic patent c/o bilateral feet swelling , painful to ambulate, fever of 101.6 in triage. Swelling has been since last Thursday but is getting worse and more painful.

## 2018-04-12 NOTE — H&P ADULT - NSHPLABSRESULTS_GEN_ALL_CORE
Sedimentation Rate, Erythrocyte (04.12.18 @ 12:13)    Sedimentation Rate, Erythrocyte: 104 mm/hr    Uric Acid, Serum (04.12.18 @ 12:13)    Uric Acid, Serum: 10.3 mg/dL    Complete Blood Count + Automated Diff (04.12.18 @ 12:13)    Nucleated RBC #: 0    WBC Count: 12.24 K/uL    RBC Count: 3.90 M/uL    Hemoglobin: 10.8 g/dL    Hematocrit: 33.1 %    Mean Cell Volume: 84.9 fL    Mean Cell Hemoglobin: 27.7 pg    Mean Cell Hemoglobin Conc: 32.6 %    Red Cell Distrib Width: 13.1 %    Platelet Count - Automated: 264 K/uL    MPV: 9.7 fl    Auto Neutrophil #: 10.23 K/uL    Auto Lymphocyte #: 0.81 K/uL    Auto Monocyte #: 1.10 K/uL    Auto Eosinophil #: 0.00 K/uL    Auto Basophil #: 0.02 K/uL    Auto Immature Granulocyte #: 0.08: (Includes meta, myelo and promyelocytes) #    Auto Neutrophil %: 83.5 %    Auto Lymphocyte %: 6.6 %    Auto Monocyte %: 9.0 %    Auto Eosinophil %: 0.0 %    Auto Basophil %: 0.2 %    Auto Immature Granulocyte %: 0.7: (Includes meta, myelo and promyelocytes) %    Comprehensive Metabolic Panel (04.12.18 @ 12:13)    Sodium, Serum: 135 mmol/L    Potassium, Serum: 3.9 mmol/L    Chloride, Serum: 94: Delta: 101 on 05/08/  Delta: 101 on 05/08/ mmol/L    Carbon Dioxide, Serum: 25 mmol/L    Blood Urea Nitrogen, Serum: 24 mg/dL    Creatinine, Serum: 1.71 mg/dL    Glucose, Serum: 121 mg/dL    Calcium, Total Serum: 9.1 mg/dL    Protein Total, Serum: 8.1: SPECIMEN NOT HEMOLYZED g/dL    Albumin, Serum: 3.3 g/dL    Bilirubin Total, Serum: 0.6 mg/dL    Alkaline Phosphatase, Serum: 184 u/L    Aspartate Aminotransferase (AST/SGOT): 50: SPECIMEN NOT HEMOLYZED u/L    Alanine Aminotransferase (ALT/SGPT): 32: SPECIMEN NOT HEMOLYZED u/L    eGFR if Non : 43    C-Reactive Protein, Serum (04.12.18 @ 11:41)    C-Reactive Protein, Serum: 323.6 mg/L    < from: Xray Foot AP + Lateral, Bilateral (04.12.18 @ 12:54) >    IMPRESSION:  Bilateral ankle soft tissue swelling right greater than left. No tracking   soft tissue gas collections, radiopaque foreign bodies, or gross   radiographic evidence for osteomyelitis.    No fractures or dislocations.    Bilaterally congruent ankle mortises with smooth and intact talar domes.    Tarsometatarsal alignment maintained without evidence for a Lisfranc   injury.    Bilateral hallux valgus deformities with associated bunions. 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 Preserved remaining visualized joint spaces. No   joint margin erosions or intra-articular or periarticular calcifications.    Nonspecific subcortical cyst medial left 1st metatarsal head with   differential considerations including degenerative etiology or   depositional/gout in the appropriate clinical and lab context. No   additional focal osseous lesions.     Bilateral vascular calcifications. Sedimentation Rate, Erythrocyte (04.12.18 @ 12:13)    Sedimentation Rate, Erythrocyte: 104 mm/hr    Uric Acid, Serum (04.12.18 @ 12:13)    Uric Acid, Serum: 10.3 mg/dL    Complete Blood Count + Automated Diff (04.12.18 @ 12:13)    Nucleated RBC #: 0    WBC Count: 12.24 K/uL    RBC Count: 3.90 M/uL    Hemoglobin: 10.8 g/dL    Hematocrit: 33.1 %    Mean Cell Volume: 84.9 fL    Mean Cell Hemoglobin: 27.7 pg    Mean Cell Hemoglobin Conc: 32.6 %    Red Cell Distrib Width: 13.1 %    Platelet Count - Automated: 264 K/uL    MPV: 9.7 fl    Auto Neutrophil #: 10.23 K/uL    Auto Lymphocyte #: 0.81 K/uL    Auto Monocyte #: 1.10 K/uL    Auto Eosinophil #: 0.00 K/uL    Auto Basophil #: 0.02 K/uL    Auto Immature Granulocyte #: 0.08: (Includes meta, myelo and promyelocytes) #    Auto Neutrophil %: 83.5 %    Auto Lymphocyte %: 6.6 %    Auto Monocyte %: 9.0 %    Auto Eosinophil %: 0.0 %    Auto Basophil %: 0.2 %    Auto Immature Granulocyte %: 0.7: (Includes meta, myelo and promyelocytes) %    Comprehensive Metabolic Panel (04.12.18 @ 12:13)    Sodium, Serum: 135 mmol/L    Potassium, Serum: 3.9 mmol/L    Chloride, Serum: 94: Delta: 101 on 05/08/  Delta: 101 on 05/08/ mmol/L    Carbon Dioxide, Serum: 25 mmol/L    Blood Urea Nitrogen, Serum: 24 mg/dL    Creatinine, Serum: 1.71 mg/dL    Glucose, Serum: 121 mg/dL    Calcium, Total Serum: 9.1 mg/dL    Protein Total, Serum: 8.1: SPECIMEN NOT HEMOLYZED g/dL    Albumin, Serum: 3.3 g/dL    Bilirubin Total, Serum: 0.6 mg/dL    Alkaline Phosphatase, Serum: 184 u/L    Aspartate Aminotransferase (AST/SGOT): 50: SPECIMEN NOT HEMOLYZED u/L    Alanine Aminotransferase (ALT/SGPT): 32: SPECIMEN NOT HEMOLYZED u/L    eGFR if Non : 43    C-Reactive Protein, Serum (04.12.18 @ 11:41)    C-Reactive Protein, Serum: 323.6 mg/L    Imaging personally reviewed:    < from: Xray Foot AP + Lateral, Bilateral (04.12.18 @ 12:54) >    IMPRESSION:  Bilateral ankle soft tissue swelling right greater than left. No tracking   soft tissue gas collections, radiopaque foreign bodies, or gross   radiographic evidence for osteomyelitis.    No fractures or dislocations.    Bilaterally congruent ankle mortises with smooth and intact talar domes.    Tarsometatarsal alignment maintained without evidence for a Lisfranc   injury.    Bilateral hallux valgus deformities with associated bunions. 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 Preserved remaining visualized joint spaces. No   joint margin erosions or intra-articular or periarticular calcifications.    Nonspecific subcortical cyst medial left 1st metatarsal head with   differential considerations including degenerative etiology or   depositional/gout in the appropriate clinical and lab context. No   additional focal osseous lesions.     Bilateral vascular calcifications.

## 2018-04-12 NOTE — H&P ADULT - PROBLEM SELECTOR PLAN 3
- last creatinine in allscripts in January 1.45, appears to be around baseline, possible slight increase due to prerenal etiology as patient reports decreased PO intake x3 days  - s/p 1L in ED, will encourage PO intake  - renally dose meds

## 2018-04-12 NOTE — H&P ADULT - PROBLEM SELECTOR PROBLEM 1
Type 2 diabetes mellitus with other specified complication, with long-term current use of insulin Inflammatory arthropathy

## 2018-04-12 NOTE — ED ADULT NURSE NOTE - OBJECTIVE STATEMENT
Pt presents to room 22, A&Ox3, ambulatory at baseline without assistance, currently with difficulty ambulating due to pian and swelling of bilateral ankles x 3 days. pt with complex medical hx of htn, iddm2, cad s/p stent, ckd  hepatitis, gout, cardiomyopathy, and gerd. pt states symptoms have been present x 3 days. reports symptoms are getting progressively worse. Pt febrile in triage, new symptom for pt. denies any dizziness, nausea, vomiting, shortness of breath, palpitations, diarrhea, constipation, or chills. IV established in left ac with a 20g, labs drawn and sent, call bell in reach, side rails up, bed in locked position, md evaluation in progress, will continue to monitor.

## 2018-04-12 NOTE — H&P ADULT - FAMILY HISTORY
Father  Still living? Unknown  Family history of cardiac disorder in father, Age at diagnosis: Age Unknown

## 2018-04-12 NOTE — ED PROVIDER NOTE - PHYSICAL EXAMINATION
B/L foot erythema and swelling.  No ulceration or obvious deformity.   Sensation and motor function in the distal extremity intact.  Decreased ROM 2/2 pain actively and passively.  2+ tibial and pedal pulses.  No pallor, poikilothermia, paresthesia, paralysis, pulselessness, or cyanosis.  No signs of trauma.  No crepitus or gas.

## 2018-04-12 NOTE — ED PROVIDER NOTE - PROGRESS NOTE DETAILS
Podiatry consult at this time. JW Discussed with podiatry who assesses a primary inflammatory etiology for the patient's symptoms.  Given the lesions do not coalesce and they are bilateral septic joint unlikely.  Pt admitted for further evaluation of an inflammatory cause of his symptoms and inability to ambulate.  MAR text paged.

## 2018-04-12 NOTE — ED PROVIDER NOTE - MEDICAL DECISION MAKING DETAILS
57 YOM DM, HTN, CAD/stents, ICM, s/p cardiomems monitor placement, gout, CKD, hepatitis, p/w B/L foot swelling and pain for the past week.  Tachycardia.  Foot swelling with fever concerning for gout, septic joint given fever and vomiting and cellulitis.  Evaluate and treat for sepsis, gout.  Pain control.  Podiatry evaluation.  Treat symptomatically likely admit.

## 2018-04-12 NOTE — H&P ADULT - NSHPPHYSICALEXAM_GEN_ALL_CORE
GENERAL: NAD, well-developed  HEAD:  Atraumatic, Normocephalic  EYES: EOMI, PERRLA, conjunctiva and sclera clear  NECK: Supple, No JVD  CHEST/LUNG: Clear to auscultation bilaterally; No wheeze  HEART: Regular rate and rhythm; No murmurs, rubs, or gallops  ABDOMEN: Soft, Nontender, Nondistended; Bowel sounds present  EXTREMITIES:  2+ Peripheral Pulses, No clubbing, cyanosis, or edema  PSYCH: AAOx3  NEUROLOGY: non-focal  SKIN: No rashes or lesions GENERAL: NAD, well-developed  HEAD:  Atraumatic, Normocephalic  EYES: EOMI, PERRLA, conjunctiva and sclera clear  NECK: Supple, No JVD  CHEST/LUNG: Clear to auscultation bilaterally; No wheezes or rales  HEART: Regular rate and rhythm; No murmurs, rubs, or gallops  ABDOMEN: Soft, Nontender, Nondistended; Bowel sounds present  EXTREMITIES:  2+ Peripheral Pulses, swelling to b/l ankles and feet, R>L, tenderness on left only to dorsum of foot, tenderness on right throughout ankle and foot, skin darkening around maleolar areas b/l  PSYCH: AAOx3  NEUROLOGY: decreased sensation to plantar surface of b/l feet Vital Signs Last 24 Hrs  T(C): 36.6 (12 Apr 2018 21:42), Max: 38.7 (12 Apr 2018 11:19)  T(F): 97.8 (12 Apr 2018 21:42), Max: 101.6 (12 Apr 2018 11:19)  HR: 86 (12 Apr 2018 21:42) (80 - 93)  BP: 154/86 (12 Apr 2018 21:42) (154/86 - 185/99)  BP(mean): --  RR: 18 (12 Apr 2018 21:42) (17 - 18)  SpO2: 97% (12 Apr 2018 21:42) (96% - 99%)  GENERAL: NAD, well-developed  HEAD:  Atraumatic, Normocephalic  EYES: EOMI, PERRLA, conjunctiva and sclera clear  NECK: Supple, No JVD  CHEST/LUNG: Clear to auscultation bilaterally; No wheezes or rales  HEART: Regular rate and rhythm; No murmurs, rubs, or gallops  ABDOMEN: Soft, Nontender, Nondistended; Bowel sounds present  EXTREMITIES:  2+ Peripheral Pulses, swelling to b/l ankles and feet, R>L, tenderness on left only to dorsum of foot, tenderness on right throughout ankle and foot, skin darkening around maleolar areas b/l  PSYCH: AAOx3  NEUROLOGY: decreased sensation to plantar surface of b/l feet

## 2018-04-13 ENCOUNTER — TRANSCRIPTION ENCOUNTER (OUTPATIENT)
Age: 58
End: 2018-04-13

## 2018-04-13 VITALS — HEART RATE: 91 BPM | SYSTOLIC BLOOD PRESSURE: 118 MMHG | DIASTOLIC BLOOD PRESSURE: 60 MMHG

## 2018-04-13 LAB
ALBUMIN SERPL ELPH-MCNC: 2.3 G/DL — LOW (ref 3.3–5)
ALP SERPL-CCNC: 138 U/L — HIGH (ref 40–120)
ALT FLD-CCNC: 26 U/L — SIGNIFICANT CHANGE UP (ref 4–41)
AST SERPL-CCNC: 21 U/L — SIGNIFICANT CHANGE UP (ref 4–40)
BASOPHILS # BLD AUTO: 0.01 K/UL — SIGNIFICANT CHANGE UP (ref 0–0.2)
BASOPHILS NFR BLD AUTO: 0.1 % — SIGNIFICANT CHANGE UP (ref 0–2)
BILIRUB SERPL-MCNC: 0.3 MG/DL — SIGNIFICANT CHANGE UP (ref 0.2–1.2)
BUN SERPL-MCNC: 33 MG/DL — HIGH (ref 7–23)
C3 SERPL-MCNC: 176 MG/DL — SIGNIFICANT CHANGE UP (ref 80–180)
C4 SERPL-MCNC: 46 MG/DL — HIGH (ref 10–45)
CALCIUM SERPL-MCNC: 8 MG/DL — LOW (ref 8.4–10.5)
CHLORIDE SERPL-SCNC: 101 MMOL/L — SIGNIFICANT CHANGE UP (ref 98–107)
CO2 SERPL-SCNC: 26 MMOL/L — SIGNIFICANT CHANGE UP (ref 22–31)
CREAT SERPL-MCNC: 1.6 MG/DL — HIGH (ref 0.5–1.3)
EOSINOPHIL # BLD AUTO: 0.01 K/UL — SIGNIFICANT CHANGE UP (ref 0–0.5)
EOSINOPHIL NFR BLD AUTO: 0.1 % — SIGNIFICANT CHANGE UP (ref 0–6)
GLUCOSE SERPL-MCNC: 167 MG/DL — HIGH (ref 70–99)
HBA1C BLD-MCNC: 9.6 % — HIGH (ref 4–5.6)
HCT VFR BLD CALC: 28.4 % — LOW (ref 39–50)
HGB BLD-MCNC: 9.2 G/DL — LOW (ref 13–17)
IMM GRANULOCYTES # BLD AUTO: 0.06 # — SIGNIFICANT CHANGE UP
IMM GRANULOCYTES NFR BLD AUTO: 0.6 % — SIGNIFICANT CHANGE UP (ref 0–1.5)
LYMPHOCYTES # BLD AUTO: 0.99 K/UL — LOW (ref 1–3.3)
LYMPHOCYTES # BLD AUTO: 9.3 % — LOW (ref 13–44)
MAGNESIUM SERPL-MCNC: 2.7 MG/DL — HIGH (ref 1.6–2.6)
MCHC RBC-ENTMCNC: 27.9 PG — SIGNIFICANT CHANGE UP (ref 27–34)
MCHC RBC-ENTMCNC: 32.4 % — SIGNIFICANT CHANGE UP (ref 32–36)
MCV RBC AUTO: 86.1 FL — SIGNIFICANT CHANGE UP (ref 80–100)
MONOCYTES # BLD AUTO: 1.19 K/UL — HIGH (ref 0–0.9)
MONOCYTES NFR BLD AUTO: 11.1 % — SIGNIFICANT CHANGE UP (ref 2–14)
NEUTROPHILS # BLD AUTO: 8.44 K/UL — HIGH (ref 1.8–7.4)
NEUTROPHILS NFR BLD AUTO: 78.8 % — HIGH (ref 43–77)
NRBC # FLD: 0 — SIGNIFICANT CHANGE UP
PHOSPHATE SERPL-MCNC: 2.9 MG/DL — SIGNIFICANT CHANGE UP (ref 2.5–4.5)
PLATELET # BLD AUTO: 232 K/UL — SIGNIFICANT CHANGE UP (ref 150–400)
PMV BLD: 9.9 FL — SIGNIFICANT CHANGE UP (ref 7–13)
POTASSIUM SERPL-MCNC: 3.6 MMOL/L — SIGNIFICANT CHANGE UP (ref 3.5–5.3)
POTASSIUM SERPL-SCNC: 3.6 MMOL/L — SIGNIFICANT CHANGE UP (ref 3.5–5.3)
PROT SERPL-MCNC: 6.2 G/DL — SIGNIFICANT CHANGE UP (ref 6–8.3)
RBC # BLD: 3.3 M/UL — LOW (ref 4.2–5.8)
RBC # FLD: 13.1 % — SIGNIFICANT CHANGE UP (ref 10.3–14.5)
RHEUMATOID FACT SERPL-ACNC: 15 IU/ML — HIGH (ref 0–13)
SODIUM SERPL-SCNC: 137 MMOL/L — SIGNIFICANT CHANGE UP (ref 135–145)
SPECIMEN SOURCE: SIGNIFICANT CHANGE UP
SPECIMEN SOURCE: SIGNIFICANT CHANGE UP
WBC # BLD: 10.7 K/UL — HIGH (ref 3.8–10.5)
WBC # FLD AUTO: 10.7 K/UL — HIGH (ref 3.8–10.5)

## 2018-04-13 PROCEDURE — 99239 HOSP IP/OBS DSCHRG MGMT >30: CPT

## 2018-04-13 RX ORDER — HYDRALAZINE HCL 50 MG
1.5 TABLET ORAL
Qty: 0 | Refills: 0 | COMMUNITY

## 2018-04-13 RX ORDER — LOSARTAN POTASSIUM 100 MG/1
1 TABLET, FILM COATED ORAL
Qty: 0 | Refills: 0 | COMMUNITY

## 2018-04-13 RX ORDER — METFORMIN HYDROCHLORIDE 850 MG/1
2 TABLET ORAL
Qty: 0 | Refills: 0 | COMMUNITY

## 2018-04-13 RX ORDER — ALLOPURINOL 300 MG
1 TABLET ORAL
Qty: 30 | Refills: 0 | OUTPATIENT
Start: 2018-04-13 | End: 2018-05-12

## 2018-04-13 RX ORDER — ALLOPURINOL 300 MG
1 TABLET ORAL
Qty: 0 | Refills: 0 | COMMUNITY

## 2018-04-13 RX ORDER — COLCHICINE 0.6 MG
1 TABLET ORAL
Qty: 3 | Refills: 0 | OUTPATIENT
Start: 2018-04-13 | End: 2018-04-15

## 2018-04-13 RX ADMIN — Medication 75 MILLIGRAM(S): at 06:39

## 2018-04-13 RX ADMIN — ISOSORBIDE DINITRATE 20 MILLIGRAM(S): 5 TABLET ORAL at 06:40

## 2018-04-13 RX ADMIN — Medication 6 UNIT(S): at 08:39

## 2018-04-13 RX ADMIN — CARVEDILOL PHOSPHATE 12.5 MILLIGRAM(S): 80 CAPSULE, EXTENDED RELEASE ORAL at 18:01

## 2018-04-13 RX ADMIN — Medication 0.6 MILLIGRAM(S): at 12:47

## 2018-04-13 RX ADMIN — GABAPENTIN 300 MILLIGRAM(S): 400 CAPSULE ORAL at 14:41

## 2018-04-13 RX ADMIN — GABAPENTIN 300 MILLIGRAM(S): 400 CAPSULE ORAL at 06:40

## 2018-04-13 RX ADMIN — Medication 300 MILLIGRAM(S): at 12:47

## 2018-04-13 RX ADMIN — PANTOPRAZOLE SODIUM 40 MILLIGRAM(S): 20 TABLET, DELAYED RELEASE ORAL at 06:39

## 2018-04-13 RX ADMIN — Medication 75 MILLIGRAM(S): at 14:41

## 2018-04-13 RX ADMIN — TICAGRELOR 90 MILLIGRAM(S): 90 TABLET ORAL at 18:01

## 2018-04-13 RX ADMIN — CARVEDILOL PHOSPHATE 12.5 MILLIGRAM(S): 80 CAPSULE, EXTENDED RELEASE ORAL at 06:40

## 2018-04-13 RX ADMIN — Medication 81 MILLIGRAM(S): at 12:46

## 2018-04-13 RX ADMIN — TICAGRELOR 90 MILLIGRAM(S): 90 TABLET ORAL at 06:40

## 2018-04-13 RX ADMIN — Medication 6 UNIT(S): at 12:45

## 2018-04-13 RX ADMIN — Medication 6 UNIT(S): at 17:46

## 2018-04-13 RX ADMIN — ISOSORBIDE DINITRATE 20 MILLIGRAM(S): 5 TABLET ORAL at 14:41

## 2018-04-13 NOTE — PROGRESS NOTE ADULT - PROBLEM SELECTOR PLAN 4
- BP elevated on admission, history of uncontrolled HTN. Pain likely also contributing   - will c/w home dose coreg, hydralazine, isordil with hold parameters  - will hold lasix in setting of possible acute gout flare, no evidence of fluid overload on exam

## 2018-04-13 NOTE — DISCHARGE NOTE ADULT - CARE PROVIDERS DIRECT ADDRESSES
[email or web address],[email or web address],[email or web address],DirectAddress_Unknown

## 2018-04-13 NOTE — PROGRESS NOTE ADULT - ATTENDING COMMENTS
D/c planning for today. Patient stating that he was last seen by rheum 2-3 yrs ago and he does not want to go back to that doctor. Will provide information for our rheumatology group. Pt advised to f/u w/i 1-2 weeks. Per PT home w/ home PT and rolling walker.  He was provided with information for recommended diet to prevent gout flares. Spent 38 min in discharge planning and coordination.

## 2018-04-13 NOTE — PROGRESS NOTE ADULT - ASSESSMENT
******INCOMPLETE NOTE --> TO BE EDITED ONCE ROUND*****    57yo M w/ b/l likely gouty exacerbation or inflammatory arthropathy, unlikely infectious etiology  ·	Pt seen and evaluated  ·	Pt has hx of gout, multiple joint complaints (digits, diffusely multi-joint in foot)  ·	Pt relays pain is similar to previously experienced attacks that occur 1-2x/yr to which he seeks ED for treatment, placed on meds and leaves  ·	No clinical sign of acute infection at foot at this time, likely edema and mild erythema 2/2 inflammatory etiology (gout, RA, seroneg, etc)  ·	Recommend po colchicine (discussed w/ ED Cr not optimal) or continued anti-inflammatories  ·	Suggest formal rheum w/u, pt wants new doctor (either if admitted or outpatient)  ·	Recommend monitoring for improvement in pain, CDU admission if warranted to assure improvement prior to d/c  ·	May require further F w/u  ·	XR reviewed, findings at MPJ level c/w RA changes wish washing out & radiolucency b/l diffusely (particularly medial), subchondral cyst formation noted at 1st MPJ b/l medially  ·	d/w attending 59yo M w/ b/l gout exacerbation; UA 10.8 and controlled Diabetes A1C 9.8%  ·	Pt seen and evaluated  ·	Pt has hx of gout, multiple joint complaints (digits, diffusely multi-joint in foot)  ·	Pt relays pain is similar to previously experienced attacks that occur 1-2x/yr to which he seeks ED for treatment, placed on meds and leaves  ·	No clinical sign of acute infection at foot at this time, likely edema and mild erythema 2/2 inflammatory etiology (gout, RA, seroneg, etc)  ·	Rec patient see Primary Care physician for adequate gout control regiment and education on gout control.   ·	XR reviewed, findings at MPJ level c/w RA changes wish washing out & radiolucency b/l diffusely (particularly medial), subchondral cyst formation noted at 1st MPJ b/l medially.  ·	No emergent pod surg intervention  ·	d/w attending 59yo M w/ b/l gout exacerbation; UA 10.8 and controlled Diabetes A1C 9.8%  ·	Pt seen and evaluated  ·	Pt has hx of gout, multiple joint complaints (digits, diffusely multi-joint in foot)  ·	Pt relays pain is similar to previously experienced attacks that occur 1-2x/yr to which he seeks ED for treatment, placed on meds and leaves  ·	No clinical sign of acute infection at foot at this time, likely edema and mild erythema 2/2 inflammatory etiology (gout, RA, seroneg, etc)  ·	Rec patient see Primary Care physician for adequate gout control regiment and education on gout control.   ·	XR reviewed, findings at MPJ level c/w RA changes wish washing out & radiolucency b/l diffusely (particularly medial), subchondral cyst formation noted at 1st MPJ b/l medially.  ·	No emergent pod surg intervention  ·	Stable for d/c   ·	d/w attending 57yo M w/ b/l gout exacerbation; UA 10.8 and controlled Diabetes A1C 9.8%  ·	Pt seen and evaluated  ·	Pt has hx of gout, multiple joint complaints (digits, diffusely multi-joint in foot)  ·	Pt relays pain is similar to previously experienced attacks that occur 1-2x/yr to which he seeks ED for treatment, placed on meds and leaves  ·	No clinical sign of acute infection at foot at this time, likely edema and mild erythema 2/2 inflammatory etiology (gout, RA, seroneg, etc)  ·	Rec patient see Primary Care physician for adequate gout control regiment and education on gout control.   ·	XR reviewed, findings at MPJ level c/w RA changes wish washing out & radiolucency b/l diffusely (particularly medial), subchondral cyst formation noted at 1st MPJ b/l medially.  ·	No emergent pod surg intervention  ·	Pod surg Stable for d/c   ·	d/w attending

## 2018-04-13 NOTE — PROGRESS NOTE ADULT - SUBJECTIVE AND OBJECTIVE BOX
Patient is a 58y old  Male who presents with a chief complaint of lower extremity swelling and pain (12 Apr 2018 16:18)     INTERVAL HPI/OVERNIGHT EVENTS:  Patient seen and evaluated at bedside.  Pt is resting comfortable in NAD. Denies N/V/F/C.  Pain rated at 10/10    Allergies    No Known Allergies    Intolerances    Vital Signs Last 24 Hrs  T(C): 36.4 (13 Apr 2018 05:32), Max: 38.7 (12 Apr 2018 11:19)  T(F): 97.6 (13 Apr 2018 05:32), Max: 101.6 (12 Apr 2018 11:19)  HR: 86 (13 Apr 2018 05:32) (80 - 93)  BP: 130/69 (13 Apr 2018 05:32) (130/69 - 185/99)  BP(mean): --  RR: 18 (13 Apr 2018 05:32) (17 - 18)  SpO2: 94% (13 Apr 2018 05:32) (94% - 99%)    LABS:                        9.2    10.70 )-----------( 232      ( 13 Apr 2018 07:32 )             28.4     04-13    137  |  101  |  33<H>  ----------------------------<  167<H>  3.6   |  26  |  1.60<H>    Ca    8.0<L>      13 Apr 2018 07:32  Phos  2.9     04-13  Mg     2.7     04-13    TPro  6.2  /  Alb  2.3<L>  /  TBili  0.3  /  DBili  x   /  AST  21  /  ALT  26  /  AlkPhos  138<H>  04-13    PT/INR - ( 12 Apr 2018 12:13 )   PT: 13.6 SEC;   INR: 1.22          PTT - ( 12 Apr 2018 12:13 )  PTT:35.6 SEC    CAPILLARY BLOOD GLUCOSE      POCT Blood Glucose.: 147 mg/dL (13 Apr 2018 08:15)  POCT Blood Glucose.: 289 mg/dL (12 Apr 2018 21:59)  POCT Blood Glucose.: 273 mg/dL (12 Apr 2018 18:24)  POCT Blood Glucose.: 257 mg/dL (12 Apr 2018 17:08)  POCT Blood Glucose.: 119 mg/dL (12 Apr 2018 11:34)      Lower Extremity Physical Exam:  Vascular: DP/PT 2/4, B/L, CFT <3 seconds B/L, Temperature gradient mildly warm to warm, B/L.   Neuro: Epicritic sensation diminished to the level of feet, B/L.  Musculoskeletal/Ortho: minimal pain w/ ROM to b/l ankle joints, first MPJ, pain elicited diffusely b/l w/ palpation to forefoot, no pain elicited w/ deep palpation to ankle  Skin: mild patchy erythema b/l no focal consolidation, no fluctuance, no open lesions, no acute SOI, b/l swelling no pitting edema    RADIOLOGY & ADDITIONAL TESTS: Patient is a 58y old  Male who presents with a chief complaint of lower extremity swelling and pain (12 Apr 2018 16:18)     INTERVAL HPI/OVERNIGHT EVENTS:  Patient seen and evaluated at bedside.  Pt is resting comfortable in NAD. Denies N/V/F/C.  Pain rated at 0/10    Allergies    No Known Allergies    Intolerances    Vital Signs Last 24 Hrs  T(C): 36.4 (13 Apr 2018 05:32), Max: 38.7 (12 Apr 2018 11:19)  T(F): 97.6 (13 Apr 2018 05:32), Max: 101.6 (12 Apr 2018 11:19)  HR: 86 (13 Apr 2018 05:32) (80 - 93)  BP: 130/69 (13 Apr 2018 05:32) (130/69 - 185/99)  BP(mean): --  RR: 18 (13 Apr 2018 05:32) (17 - 18)  SpO2: 94% (13 Apr 2018 05:32) (94% - 99%)    LABS:                        9.2    10.70 )-----------( 232      ( 13 Apr 2018 07:32 )             28.4     04-13    137  |  101  |  33<H>  ----------------------------<  167<H>  3.6   |  26  |  1.60<H>    Ca    8.0<L>      13 Apr 2018 07:32  Phos  2.9     04-13  Mg     2.7     04-13    TPro  6.2  /  Alb  2.3<L>  /  TBili  0.3  /  DBili  x   /  AST  21  /  ALT  26  /  AlkPhos  138<H>  04-13    PT/INR - ( 12 Apr 2018 12:13 )   PT: 13.6 SEC;   INR: 1.22          PTT - ( 12 Apr 2018 12:13 )  PTT:35.6 SEC    CAPILLARY BLOOD GLUCOSE      POCT Blood Glucose.: 147 mg/dL (13 Apr 2018 08:15)  POCT Blood Glucose.: 289 mg/dL (12 Apr 2018 21:59)  POCT Blood Glucose.: 273 mg/dL (12 Apr 2018 18:24)  POCT Blood Glucose.: 257 mg/dL (12 Apr 2018 17:08)  POCT Blood Glucose.: 119 mg/dL (12 Apr 2018 11:34)      Lower Extremity Physical Exam:  Vascular: DP/PT 2/4, B/L, CFT <3 seconds B/L, Temperature gradient mildly warm to warm, B/L.   Neuro: Epicritic sensation diminished to the level of feet, B/L.  Musculoskeletal/Ortho: minimal pain w/ ROM to b/l ankle joints, first MPJ, pain elicited diffusely b/l w/ palpation to forefoot, no pain elicited w/ deep palpation to ankle  Skin: mild patchy erythema b/l no focal consolidation, no fluctuance, no open lesions, no acute SOI, b/l swelling no pitting edema    RADIOLOGY & ADDITIONAL TESTS:

## 2018-04-13 NOTE — DISCHARGE NOTE ADULT - HOSPITAL COURSE
58M with uncontrolled DM2 with neuropathy, nephropathy and retinopathy (last A1C 12.0 in 12/2017), CKD stage 3, gout, CHF with cardioMEMS, HTN, HLD, CAD s/p 4 stents presenting with 1 week of b/l LE swelling and pain. Patient states it started last Thursday and he originally had to walk with a cane. It slowly progressed to him having to walk with a walker and then unable to ambulate at all. He states the pain is a throbbing and tried taking tylenol however this did not help. He did not try NSAIDs because he states his nephrologist told him to avoid them. He denies any fevers, chills, NS, weight changes, abdominal pain, CP, SOB, palpitations. Patient reports 1 episode of vomiting in ambulance on the way to hospital. He also reports no appetite in the past 3 days and therefore has not been eating. He reports this swelling in his legs happens once a year and reports no inciting events that he can recall. He reports he does not take anything for gout.     In ED vitals include 185/99, HR 93, 101.6, 18-98%RA. Labs significant for ESR of 104, .6, uric acid of 10.3, Creatinine 1.74, WBC 12.24. 58M with uncontrolled DM2 with neuropathy, nephropathy and retinopathy (last A1C 12.0 in 12/2017), CKD stage 3, gout, CHF with cardioMEMS, HTN, HLD, CAD s/p 4 stents presenting with 1 week of b/l LE swelling and pain. Patient states it started 1 week prior to admission and he originally had to walk with a cane. It slowly progressed to him having to walk with crutchers and then unable to ambulate at all. He states the pain is a throbbing and tried taking tylenol however this did not help. He did not try NSAIDs because he states his nephrologist told him to avoid them. He denies any fevers, chills, NS, weight changes, abdominal pain, CP, SOB, palpitations. Patient reports 1 episode of vomiting in ambulance on the way to hospital. He also reports no appetite in the past 3 days and therefore has not been eating. He reports this swelling in his legs happens once a year and reports no inciting events that he can recall.     In ED vitals include 185/99, HR 93, 101.6, 18-98%RA. Labs significant for ESR of 104, .6, uric acid of 10.3, Creatinine 1.74, WBC 12.24. Xrays showed no 58M with uncontrolled DM2 with neuropathy, nephropathy and retinopathy (last A1C 12.0 in 12/2017), CKD stage 3, gout, CHF with cardioMEMS, HTN, HLD, CAD s/p 4 stents presenting with 1 week of b/l LE swelling and pain. Patient states it started 1 week prior to admission and he originally had to walk with a cane. It slowly progressed to him having to walk with crutchers and then unable to ambulate at all. He states the pain is a throbbing and tried taking tylenol however this did not help. He did not try NSAIDs because he states his nephrologist told him to avoid them. He denies any fevers, chills, NS, weight changes, abdominal pain, CP, SOB, palpitations. Patient reports 1 episode of vomiting in ambulance on the way to hospital. He also reports no appetite in the past 3 days and therefore has not been eating. He reports this swelling in his legs happens once a year and reports no inciting events that he can recall.     In ED vitals include 185/99, HR 93, 101.6, 18-98%RA. Labs significant for ESR of 104, .6, uric acid of 10.3, Creatinine 1.74, WBC 12.24. Xrays showed no signs of infection, Nonspecific subcortical cyst medial left 1st metatarsal head indicative of inflammatory process. Patient was continued on all home meds except for lasix in setting of acute gout flare. patient was started on colchicine with improvement in symptoms. He received 1 dose of prednisone in ED however further steroids were held in setting of improvement with colchicine. Podiatry was consulted and agreed with inflammatory process as well as treating with colchicine. 58M with uncontrolled DM2 with neuropathy, nephropathy and retinopathy (last A1C 12.0 in 12/2017), CKD stage 3, gout, CHF with cardioMEMS, HTN, HLD, CAD s/p 4 stents presenting with 1 week of b/l LE swelling and pain. Patient states it started 1 week prior to admission and he originally had to walk with a cane. It slowly progressed to him having to walk with crutchers and then unable to ambulate at all. He states the pain is a throbbing and tried taking tylenol however this did not help. He did not try NSAIDs because he states his nephrologist told him to avoid them. He denies any fevers, chills, NS, weight changes, abdominal pain, CP, SOB, palpitations. Patient reports 1 episode of vomiting in ambulance on the way to hospital. He also reports no appetite in the past 3 days and therefore has not been eating. He reports this swelling in his legs happens once a year and reports no inciting events that he can recall.     In ED vitals include 185/99, HR 93, 101.6, 18-98%RA. Labs significant for ESR of 104, .6, uric acid of 10.3, Creatinine 1.74, WBC 12.24. Xrays showed no signs of infection, Nonspecific subcortical cyst medial left 1st metatarsal head indicative of inflammatory process. Patient was continued on all home meds except for lasix in setting of acute gout flare. patient was started on colchicine with improvement in symptoms. He received 1 dose of prednisone in ED however further steroids were held in setting of improvement with colchicine. Podiatry was consulted and agreed with inflammatory process as well as treating with colchicine. Patient was provided with list of foods to avoid gout and prescriptions sent for colchicine and allopurinol. Patient was given number and address for rheumatology 58M with uncontrolled DM2 with neuropathy, nephropathy and retinopathy (last A1C 12.0 in 12/2017), CKD stage 3, gout, CHF with cardioMEMS, HTN, HLD, CAD s/p 4 stents presenting with 1 week of b/l LE swelling and pain. Patient states it started 1 week prior to admission and he originally had to walk with a cane. It slowly progressed to him having to walk with crutchers and then unable to ambulate at all. He states the pain is a throbbing and tried taking tylenol however this did not help. He did not try NSAIDs because he states his nephrologist told him to avoid them. He denies any fevers, chills, NS, weight changes, abdominal pain, CP, SOB, palpitations. Patient reports 1 episode of vomiting in ambulance on the way to hospital. He also reports no appetite in the past 3 days and therefore has not been eating. He reports this swelling in his legs happens once a year and reports no inciting events that he can recall.     In ED vitals include 185/99, HR 93, 101.6, 18-98%RA. Labs significant for ESR of 104, .6, uric acid of 10.3, Creatinine 1.74, WBC 12.24. Xrays showed no signs of infection, Nonspecific subcortical cyst medial left 1st metatarsal head indicative of inflammatory process. Patient was continued on all home meds except for lasix in setting of acute gout flare. patient was started on colchicine with improvement in symptoms. He received 1 dose of prednisone in ED however further steroids were held in setting of improvement with colchicine. Podiatry was consulted and agreed with inflammatory process as well as treating with colchicine. Patient was provided with list of foods to avoid gout and prescriptions sent for colchicine and allopurinol. Patient was given number and address for rheumatology clinic for follow up. Patient was HDS for d/c. 58M with uncontrolled DM2 with neuropathy, nephropathy and retinopathy (last A1C 12.0 in 12/2017), CKD stage 3, gout, CHF with cardioMEMS, HTN, HLD, CAD s/p 4 stents presenting with 1 week of b/l LE swelling and pain. Patient states it started 1 week prior to admission and he originally had to walk with a cane. It slowly progressed to him having to walk with crutchers and then unable to ambulate at all. He states the pain is a throbbing and tried taking tylenol however this did not help. He did not try NSAIDs because he states his nephrologist told him to avoid them. He denies any fevers, chills, NS, weight changes, abdominal pain, CP, SOB, palpitations. Patient reports 1 episode of vomiting in ambulance on the way to hospital. He also reports no appetite in the past 3 days and therefore has not been eating. He reports this swelling in his legs happens once a year and reports no inciting events that he can recall.     In ED vitals include 185/99, HR 93, 101.6, 18-98%RA. Labs significant for ESR of 104, .6, uric acid of 10.3, Creatinine 1.74, WBC 12.24. Xrays showed no signs of infection, Nonspecific subcortical cyst medial left 1st metatarsal head indicative of inflammatory process. Patient was continued on all home meds except for lasix in setting of acute gout flare. patient was started on colchicine with improvement in symptoms. He received 1 dose of prednisone in ED however further steroids were held in setting of improvement with colchicine. Podiatry was consulted and agreed with inflammatory process as well as treating with colchicine. Patient was provided with list of foods to avoid gout and prescriptions sent for colchicine and allopurinol. Patient was given number and address for rheumatology clinic for follow up. Patient was hemodynamically stable for d/c.

## 2018-04-13 NOTE — PROGRESS NOTE ADULT - PROBLEM SELECTOR PLAN 7
- history of CAD with 4 stents, unsure of date of last stent placement  - no current symptoms at this time  - c/w asa, brilinta, carvedilol

## 2018-04-13 NOTE — DISCHARGE NOTE ADULT - SECONDARY DIAGNOSIS.
Type 2 diabetes mellitus with other specified complication, with long-term current use of insulin Essential hypertension Chronic systolic heart failure Chronic kidney disease (CKD), stage III (moderate) Coronary artery disease involving native coronary artery of native heart without angina pectoris

## 2018-04-13 NOTE — PROGRESS NOTE ADULT - SUBJECTIVE AND OBJECTIVE BOX
Patient is a 58y old  Male who presents with a chief complaint of lower extremity swelling and pain (12 Apr 2018 16:18)      SUBJECTIVE / OVERNIGHT EVENTS:    MEDICATIONS  (STANDING):  allopurinol 300 milliGRAM(s) Oral daily  aspirin enteric coated 81 milliGRAM(s) Oral daily  atorvastatin 80 milliGRAM(s) Oral at bedtime  carvedilol 12.5 milliGRAM(s) Oral every 12 hours  colchicine 0.6 milliGRAM(s) Oral daily  dextrose 5%. 1000 milliLiter(s) (50 mL/Hr) IV Continuous <Continuous>  dextrose 50% Injectable 12.5 Gram(s) IV Push once  dextrose 50% Injectable 25 Gram(s) IV Push once  dextrose 50% Injectable 25 Gram(s) IV Push once  gabapentin 300 milliGRAM(s) Oral three times a day  hydrALAZINE 75 milliGRAM(s) Oral three times a day  insulin detemir injectable (LEVEMIR) 40 Unit(s) SubCutaneous at bedtime  insulin lispro (HumaLOG) corrective regimen sliding scale   SubCutaneous three times a day before meals  insulin lispro (HumaLOG) corrective regimen sliding scale   SubCutaneous at bedtime  insulin lispro Injectable (HumaLOG) 6 Unit(s) SubCutaneous three times a day before meals  isosorbide   dinitrate Tablet (ISORDIL) 20 milliGRAM(s) Oral three times a day  pantoprazole    Tablet 40 milliGRAM(s) Oral before breakfast  tamsulosin 0.4 milliGRAM(s) Oral daily  ticagrelor 90 milliGRAM(s) Oral two times a day    MEDICATIONS  (PRN):  dextrose Gel 1 Dose(s) Oral once PRN Blood Glucose LESS THAN 70 milliGRAM(s)/deciliter  glucagon  Injectable 1 milliGRAM(s) IntraMuscular once PRN Glucose LESS THAN 70 milligrams/deciliter        CAPILLARY BLOOD GLUCOSE      POCT Blood Glucose.: 289 mg/dL (12 Apr 2018 21:59)  POCT Blood Glucose.: 273 mg/dL (12 Apr 2018 18:24)  POCT Blood Glucose.: 257 mg/dL (12 Apr 2018 17:08)  POCT Blood Glucose.: 119 mg/dL (12 Apr 2018 11:34)    I&O's Summary      PHYSICAL EXAM:  Vital Signs Last 24 Hrs  T(C): 36.4 (13 Apr 2018 05:32), Max: 38.7 (12 Apr 2018 11:19)  T(F): 97.6 (13 Apr 2018 05:32), Max: 101.6 (12 Apr 2018 11:19)  HR: 86 (13 Apr 2018 05:32) (80 - 93)  BP: 130/69 (13 Apr 2018 05:32) (130/69 - 185/99)  BP(mean): --  RR: 18 (13 Apr 2018 05:32) (17 - 18)  SpO2: 94% (13 Apr 2018 05:32) (94% - 99%)    GENERAL: NAD, well-developed  HEAD:  Atraumatic, Normocephalic  EYES: EOMI, PERRLA, conjunctiva and sclera clear  NECK: Supple, No JVD  CHEST/LUNG: Clear to auscultation bilaterally; No wheezes or rales  HEART: Regular rate and rhythm; No murmurs, rubs, or gallops  ABDOMEN: Soft, Nontender, Nondistended; Bowel sounds present  EXTREMITIES:  2+ Peripheral Pulses, swelling to b/l ankles and feet, R>L, tenderness on left only to dorsum of foot, tenderness on right throughout ankle and foot, skin darkening around maleolar areas b/l  PSYCH: AAOx3  NEUROLOGY: decreased sensation to plantar surface of b/l feet    LABS:  (04-12 @ 12:13)                        10.8  12.24 )-----------( 264                 33.1    Neutrophils = 10.23 (83.5%)  Lymphocytes = 0.81 (6.6%)  Eosinophils = 0.00 (0.0%)  Basophils = 0.02 (0.2%)  Monocytes = 1.10 (9.0%)  Bands = --%    WBC Trend: 12.24<--  Hb Trend: 10.8<--  Plt Trend: 264<--  04-12    135  |  94<L>  |  24<H>  ----------------------------<  121<H>  3.9   |  25  |  1.71<H>    Ca    9.1      12 Apr 2018 12:13    TPro  8.1  /  Alb  3.3  /  TBili  0.6  /  DBili  x   /  AST  50<H>  /  ALT  32  /  AlkPhos  184<H>  04-12    Creatinine Trend: 1.71<--  ( 12 Apr 2018 12:13 )   PT: 13.6 SEC;   INR: 1.22 ;       PTT:35.6 SEC          Microbiology:  Urine Cx:  Blood Cx:    RADIOLOGY & ADDITIONAL TESTS:  X- Ray:  CT:  Ultrasound:  [ ] imaging personally reviewed and interpreted by me    Consultant(s) Notes Reviewed:      Care Discussed with Consultants/Other Providers: Patient is a 58y old  Male who presents with a chief complaint of lower extremity swelling and pain (12 Apr 2018 16:18)      SUBJECTIVE / OVERNIGHT EVENTS: no acute events overnight. patient seen this morning with improvement in symptoms. Reports he was able to ambulate to bathroom with minimal pain. Reports the swelling has improved as well. Denies CP, SOB, N/V, fevers, chills.     MEDICATIONS  (STANDING):  allopurinol 300 milliGRAM(s) Oral daily  aspirin enteric coated 81 milliGRAM(s) Oral daily  atorvastatin 80 milliGRAM(s) Oral at bedtime  carvedilol 12.5 milliGRAM(s) Oral every 12 hours  colchicine 0.6 milliGRAM(s) Oral daily  dextrose 5%. 1000 milliLiter(s) (50 mL/Hr) IV Continuous <Continuous>  dextrose 50% Injectable 12.5 Gram(s) IV Push once  dextrose 50% Injectable 25 Gram(s) IV Push once  dextrose 50% Injectable 25 Gram(s) IV Push once  gabapentin 300 milliGRAM(s) Oral three times a day  hydrALAZINE 75 milliGRAM(s) Oral three times a day  insulin detemir injectable (LEVEMIR) 40 Unit(s) SubCutaneous at bedtime  insulin lispro (HumaLOG) corrective regimen sliding scale   SubCutaneous three times a day before meals  insulin lispro (HumaLOG) corrective regimen sliding scale   SubCutaneous at bedtime  insulin lispro Injectable (HumaLOG) 6 Unit(s) SubCutaneous three times a day before meals  isosorbide   dinitrate Tablet (ISORDIL) 20 milliGRAM(s) Oral three times a day  pantoprazole    Tablet 40 milliGRAM(s) Oral before breakfast  tamsulosin 0.4 milliGRAM(s) Oral daily  ticagrelor 90 milliGRAM(s) Oral two times a day    MEDICATIONS  (PRN):  dextrose Gel 1 Dose(s) Oral once PRN Blood Glucose LESS THAN 70 milliGRAM(s)/deciliter  glucagon  Injectable 1 milliGRAM(s) IntraMuscular once PRN Glucose LESS THAN 70 milligrams/deciliter        CAPILLARY BLOOD GLUCOSE      POCT Blood Glucose.: 289 mg/dL (12 Apr 2018 21:59)  POCT Blood Glucose.: 273 mg/dL (12 Apr 2018 18:24)  POCT Blood Glucose.: 257 mg/dL (12 Apr 2018 17:08)  POCT Blood Glucose.: 119 mg/dL (12 Apr 2018 11:34)    I&O's Summary      PHYSICAL EXAM:  Vital Signs Last 24 Hrs  T(C): 36.4 (13 Apr 2018 05:32), Max: 38.7 (12 Apr 2018 11:19)  T(F): 97.6 (13 Apr 2018 05:32), Max: 101.6 (12 Apr 2018 11:19)  HR: 86 (13 Apr 2018 05:32) (80 - 93)  BP: 130/69 (13 Apr 2018 05:32) (130/69 - 185/99)  BP(mean): --  RR: 18 (13 Apr 2018 05:32) (17 - 18)  SpO2: 94% (13 Apr 2018 05:32) (94% - 99%)    GENERAL: NAD, well-developed  HEAD:  Atraumatic, Normocephalic  EYES: EOMI, PERRLA, conjunctiva and sclera clear  NECK: Supple, No JVD  CHEST/LUNG: Clear to auscultation bilaterally; No wheezes or rales  HEART: Regular rate and rhythm; No murmurs, rubs, or gallops  ABDOMEN: Soft, Nontender, Nondistended; Bowel sounds present  EXTREMITIES:  2+ Peripheral Pulses, swelling to b/l ankles and feet improved, R>L, nontender to palpation, skin darkening around maleolar areas b/l  PSYCH: AAOx3  NEUROLOGY: decreased sensation to plantar surface of b/l feet    LABS:  (04-12 @ 12:13)                        10.8  12.24 )-----------( 264                 33.1    Neutrophils = 10.23 (83.5%)  Lymphocytes = 0.81 (6.6%)  Eosinophils = 0.00 (0.0%)  Basophils = 0.02 (0.2%)  Monocytes = 1.10 (9.0%)  Bands = --%    WBC Trend: 12.24<--  Hb Trend: 10.8<--  Plt Trend: 264<--  04-12    135  |  94<L>  |  24<H>  ----------------------------<  121<H>  3.9   |  25  |  1.71<H>    Ca    9.1      12 Apr 2018 12:13    TPro  8.1  /  Alb  3.3  /  TBili  0.6  /  DBili  x   /  AST  50<H>  /  ALT  32  /  AlkPhos  184<H>  04-12    Creatinine Trend: 1.71<--  ( 12 Apr 2018 12:13 )   PT: 13.6 SEC;   INR: 1.22 ;    PTT:35.6 SEC        Microbiology:  Urine Cx:  Blood Cx:    RADIOLOGY & ADDITIONAL TESTS:  X- Ray:  CT:  Ultrasound:  [ ] imaging personally reviewed and interpreted by me    Consultant(s) Notes Reviewed:      Care Discussed with Consultants/Other Providers:

## 2018-04-13 NOTE — DISCHARGE NOTE ADULT - ADDITIONAL INSTRUCTIONS
Please follow up with your Cardiologist, Nephrologist and Primary Care Doctor as previously scheduled. Please follow up with a Rheumatologist within 1 week of discharge to establish care and continue monitoring for gout.

## 2018-04-13 NOTE — PROGRESS NOTE ADULT - ASSESSMENT
58M with uncontrolled DM2 with neuropathy, nephropathy and retinopathy (last A1C 12.0 in 12/2017), CKD stage 3, gout, CHF with cardioMEMS, HTN, HLD, CAD s/p 4 stents presenting with 1 week of b/l LE swelling and pain found to meet SIRS criteria (fever, HR> 90, leukocytosis suspected 2/2 gout flare.

## 2018-04-13 NOTE — PHYSICAL THERAPY INITIAL EVALUATION ADULT - PATIENT PROFILE REVIEW, REHAB EVAL
PT orders received--ambulate with assistance. Consult with SELVIN TORRES-->pt OK to participate in PT evaluation./yes

## 2018-04-13 NOTE — DISCHARGE NOTE ADULT - OTHER SIGNIFICANT FINDINGS
< from: Xray Foot AP + Lateral, Bilateral (04.12.18 @ 12:54) >    IMPRESSION:  Bilateral ankle soft tissue swelling right greater than left. No tracking   soft tissue gas collections, radiopaque foreign bodies, or gross   radiographic evidence for osteomyelitis.    No fractures or dislocations.    Bilaterally congruent ankle mortises with smooth and intact talar domes.    Tarsometatarsal alignment maintained without evidence for a Lisfranc   injury.    Bilateral hallux valgus deformities with associated bunions. 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 Preserved remaining visualized joint spaces. No   joint margin erosions or intra-articular or periarticular calcifications.    Nonspecific subcortical cyst medial left 1st metatarsal head with   differential considerations including degenerative etiology or   depositional/gout in the appropriate clinical and lab context. No   additional focal osseous lesions.     Bilateral vascular calcifications.    < end of copied text >

## 2018-04-13 NOTE — DISCHARGE NOTE ADULT - CARE PROVIDER_API CALL
Jeanne Barragan (ELICIA), Geriatric Medicine; Internal Medicine  [address] [facility]  [address]  [place] [address]  Phone: [phone]  Fax: [phone]    Dianne Patiño (ELICIA), Internal Medicine  [id number] [place] [address]  Phone: [phone]  Fax: [phone]    Steve Hernandez), Internal Medicine  [address] [place] [address]  Phone: [phone]  Fax: [phone] Jeanne Barragan (ELICIA), Geriatric Medicine; Internal Medicine  [address] [facility]  [address]  [place] [address]  Phone: [phone]  Fax: [phone]    Dianne Patiño (ELICIA), Internal Medicine  [address] [place] [address]  Phone: [phone]  Fax: [phone]    Steve Hernandez), Internal Medicine  [address] [place] [address]  Phone: [phone]  Fax: [phone]    Rheumatology,   [address] [place] [address]  Phone: [phone]  Fax: (   )    -

## 2018-04-13 NOTE — DISCHARGE NOTE ADULT - PROVIDER TOKENS
TOKEN:'[id number]',TOKEN:'[id number]',TOKEN:'[id number]' TOKEN:'[id number]:'[id number]',TOKEN:'[id number]',FREE:[LAST:[Rheumatology],PHONE:[phone]],FAX:[(   )    -],ADDRESS:[[address] [place] [address]]]

## 2018-04-13 NOTE — DISCHARGE NOTE ADULT - PATIENT PORTAL LINK FT
You can access the [facility] Patient Portal, offered by [facility], by registering with the following website: [email or web address]

## 2018-04-13 NOTE — DISCHARGE NOTE ADULT - PLAN OF CARE
follow up with your endocrinologist follow up with your cardiologist follow up with your nephrologist follow up with Rheumatologist You were admitted to the hospital because of a gout attack in your ankles, especially your right ankle. Imaging of your ankles did not show any infection however did show non-specific changes that are associated with inflammatory arthritis. You were started on a medication that is used for an acute gout flare called colchicine which helped your symptoms. Please continue You were admitted to the hospital because of a gout attack in your ankles, especially your right ankle. Imaging of your ankles did not show any infection however did show non-specific changes that are associated with inflammatory arthritis. You were started on a medication that is used for an acute gout flare called colchicine which helped your symptoms. Please continue to take this for the next 2 days as well as the allopurinol every day. Please follow up with your previous Rheumatologist or with the new clinic at [phone] within 1 week of discharge for further monitoring and/or testing as well as treatment for gout. Please continue to take use your insulin as prescribed and follow up with your endocrinologist for further monitoring. Your A1C while you were admitted was 9.2 which means your blood sugar is uncontrolled. Please continue to check your blood sugar with every meal and follow a low carbohydrate diet. Please continue to take your blood pressure medications as prescribed and follow a low salt diet. Please follow up with your cardiologist as previously scheduled. Please continue to take your medications as prescribed and follow a low salt diet. Please follow up with your cardiologist as previously scheduled. Please continue to take your medications as prescribed and follow a low salt diet. Please follow up with your cardiologist as previously scheduled. If you experience any increased leg swelling, shortness of breath, chest pain, palpitations please contact your doctor immediately. Please follow up with your nephrologist for further monitoring of your kidney function. Please monitor for any changes in urination including decreased urination, change in color or blood in your urine.

## 2018-04-13 NOTE — PROGRESS NOTE ADULT - PROBLEM SELECTOR PLAN 1
Suspect gout flare  - patient presenting with b/l ankle swelling and pain, R>L, happens yearly  - labs sig for elevated ESR, CRP, elevated UA, leukocytosis  - had fever on admission however does not appear toxic and joint does not appear infected at this time, will monitor off antibiotics  - xrays as above, no evidence of osteo, nonspecific subcortical cyst  - podiatry recs appreciated   - s/p 1 dose of prednisone in ED, will start colchicine for acute flare and continue with allopurinol renally dosed. Will consider steroid taper if no improvement but would rather avoid systemic steroids if possible.  - RF elevated on morning labs, will add CCP, has had negative RF and CCP in past Suspect gout flare  - patient presenting with b/l ankle swelling and pain, R>L, happens yearly  - labs sig for elevated ESR, CRP, elevated UA, leukocytosis  - had fever on admission however does not appear toxic and joint does not appear infected at this time, will monitor off antibiotics  - xrays as above, no evidence of osteo, nonspecific subcortical cyst  - podiatry recs appreciated   - s/p 1 dose of prednisone in ED, will start colchicine for acute flare and continue with allopurinol renally dosed. given improvement will continue for now until resolution of symptoms  - RF elevated on morning labs, likely Suspect gout flare  - patient presenting with b/l ankle swelling and pain, R>L, happens yearly  - labs sig for elevated ESR, CRP, elevated UA, leukocytosis  - had fever on admission however does not appear toxic and joint does not appear infected at this time, will monitor off antibiotics  - xrays as above, no evidence of osteo, nonspecific subcortical cyst  - podiatry recs appreciated   - s/p 1 dose of prednisone in ED, started on colchicine  yesterday for acute flare and continue with allopurinol renally dosed. given improvement will continue for now until resolution of symptoms  - RF elevated on morning labs, likely

## 2018-04-13 NOTE — PROGRESS NOTE ADULT - PROBLEM SELECTOR PLAN 2
- uncontrolled c/b nephropathy, neuropathy and retinopathy  - last A1C 12.0 in December  - f/u A1C today  - will start insulin at lower dose while in admitted, levemir 40U, lispro 6U and ISS, will uptitrate as needed, FS AC QHS  - consistent carb diet

## 2018-04-13 NOTE — DISCHARGE NOTE ADULT - MEDICATION SUMMARY - MEDICATIONS TO TAKE
I will START or STAY ON the medications listed below when I get home from the hospital:    rolling walker  -- Please dispense 1 rolling walker  -- Indication: For Gait Stability    aspirin 81 mg oral tablet  -- 1 tab(s) by mouth once a day  -- Indication: For Cardiovascular Health    tamsulosin 0.4 mg oral capsule  -- 1 cap(s) by mouth once a day  -- Indication: For BPH    isosorbide dinitrate 20 mg oral tablet  -- 1 tab(s) by mouth 3 times a day  -- Indication: For Chronic systolic heart failure    gabapentin 300 mg oral capsule  -- 1 cap(s) by mouth 3 times a day  -- Indication: For Type 2 diabetes mellitus with other specified complication, with long-term current use of insulin    metFORMIN 500 mg oral tablet, extended release  -- 2 tab(s) by mouth 2 times a day  -- Indication: For Type 2 diabetes mellitus with other specified complication, with long-term current use of insulin    Levemir FlexPen 100 units/mL subcutaneous solution  -- 55 unit(s) subcutaneous once a day (at bedtime)  -- Indication: For Type 2 diabetes mellitus with other specified complication, with long-term current use of insulin    HumaLOG 100 units/mL subcutaneous solution  -- 12 unit(s) sliding scale based on blood glucose level subcutaneous 3 times a day (before meals)  -- Indication: For Type 2 diabetes mellitus with other specified complication, with long-term current use of insulin    colchicine 0.6 mg oral tablet  -- 1 tab(s) by mouth once a day  -- Indication: For Gout attack    allopurinol 300 mg oral tablet  -- 1 tab(s) by mouth once a day  -- Indication: For Gout attack    levocetirizine 5 mg oral tablet  -- 1 tab(s) by mouth once a day (in the evening)  -- Indication: For Allergies    atorvastatin 80 mg oral tablet  -- 1 tab(s) by mouth once a day  -- Indication: For Coronary artery disease involving native coronary artery of native heart without angina pectoris    Brilinta (ticagrelor) 90 mg oral tablet  -- 1 tab(s) by mouth 2 times a day  -- Indication: For Coronary artery disease involving native coronary artery of native heart without angina pectoris    carvedilol 12.5 mg oral tablet  -- 1 tab(s) by mouth 2 times a day  -- Indication: For Chronic systolic heart failure    furosemide 40 mg oral tablet  -- 1 tab(s) by mouth once a day  -- Indication: For Chronic systolic heart failure    Alpha Lipoic Acid 600 mg oral capsule  -- 1 cap(s) by mouth once a day (at bedtime)  -- Indication: For Health Maintenance    pantoprazole 40 mg oral delayed release tablet  -- 1 tab(s) by mouth once a day  -- Indication: For GERD    hydrALAZINE 25 mg oral tablet  -- 3 tab(s) by mouth 3 times a day  -- Indication: For Chronic systolic heart failure    Vitamin D2 50,000 intl units (1.25 mg) oral capsule  -- 1 cap(s) by mouth once a week on Sunday  -- Indication: For Health Maintenance

## 2018-04-13 NOTE — PHYSICAL THERAPY INITIAL EVALUATION ADULT - LEVEL OF INDEPENDENCE: GAIT, REHAB EVAL
Patient experienced dizziness while standing, YB=881/58. SELVIN TORRES aware. Patient left semi-supine in bed in NAD. Will f/u for gait assessment when appropriate.

## 2018-04-13 NOTE — PHYSICAL THERAPY INITIAL EVALUATION ADULT - ADDITIONAL COMMENTS
Patient reports he lives with his wife and children in a private house, 2 steps to enter and 1 flight within, +handrail. Patient reports he was previously independent in all ADLs and did not use an assistive device for ambulation.    Patient was left semi-supine in bed as found, all lines/tubes intact and call flores within reach, SELVIN melo

## 2018-04-13 NOTE — DISCHARGE NOTE ADULT - CARE PLAN
Principal Discharge DX:	Acute gout of right foot, unspecified cause  Secondary Diagnosis:	Type 2 diabetes mellitus with other specified complication, with long-term current use of insulin  Secondary Diagnosis:	Essential hypertension  Secondary Diagnosis:	Chronic systolic heart failure  Secondary Diagnosis:	Chronic kidney disease (CKD), stage III (moderate)  Secondary Diagnosis:	Coronary artery disease involving native coronary artery of native heart without angina pectoris Principal Discharge DX:	Acute gout of right foot, unspecified cause  Secondary Diagnosis:	Type 2 diabetes mellitus with other specified complication, with long-term current use of insulin  Goal:	follow up with your endocrinologist  Secondary Diagnosis:	Essential hypertension  Goal:	follow up with your cardiologist  Secondary Diagnosis:	Chronic systolic heart failure  Goal:	follow up with your cardiologist  Secondary Diagnosis:	Chronic kidney disease (CKD), stage III (moderate)  Goal:	follow up with your nephrologist  Secondary Diagnosis:	Coronary artery disease involving native coronary artery of native heart without angina pectoris  Goal:	follow up with your cardiologist Principal Discharge DX:	Acute gout of right foot, unspecified cause  Goal:	follow up with Rheumatologist  Assessment and plan of treatment:	You were admitted to the hospital because of a gout attack in your ankles, especially your right ankle. Imaging of your ankles did not show any infection however did show non-specific changes that are associated with inflammatory arthritis. You were started on a medication that is used for an acute gout flare called colchicine which helped your symptoms. Please continue  Secondary Diagnosis:	Type 2 diabetes mellitus with other specified complication, with long-term current use of insulin  Goal:	follow up with your endocrinologist  Secondary Diagnosis:	Essential hypertension  Goal:	follow up with your cardiologist  Secondary Diagnosis:	Chronic systolic heart failure  Goal:	follow up with your cardiologist  Secondary Diagnosis:	Chronic kidney disease (CKD), stage III (moderate)  Goal:	follow up with your nephrologist  Secondary Diagnosis:	Coronary artery disease involving native coronary artery of native heart without angina pectoris  Goal:	follow up with your cardiologist Principal Discharge DX:	Acute gout of right foot, unspecified cause  Goal:	follow up with Rheumatologist  Assessment and plan of treatment:	You were admitted to the hospital because of a gout attack in your ankles, especially your right ankle. Imaging of your ankles did not show any infection however did show non-specific changes that are associated with inflammatory arthritis. You were started on a medication that is used for an acute gout flare called colchicine which helped your symptoms. Please continue to take this for the next 2 days as well as the allopurinol every day. Please follow up with your previous Rheumatologist or with the new clinic at [phone] within 1 week of discharge for further monitoring and/or testing as well as treatment for gout.  Secondary Diagnosis:	Type 2 diabetes mellitus with other specified complication, with long-term current use of insulin  Goal:	follow up with your endocrinologist  Assessment and plan of treatment:	Please continue to take use your insulin as prescribed and follow up with your endocrinologist for further monitoring. Your A1C while you were admitted was 9.2 which means your blood sugar is uncontrolled. Please continue to check your blood sugar with every meal and follow a low carbohydrate diet.  Secondary Diagnosis:	Essential hypertension  Goal:	follow up with your cardiologist  Assessment and plan of treatment:	Please continue to take your blood pressure medications as prescribed and follow a low salt diet. Please follow up with your cardiologist as previously scheduled.  Secondary Diagnosis:	Chronic systolic heart failure  Goal:	follow up with your cardiologist  Assessment and plan of treatment:	Please continue to take your medications as prescribed and follow a low salt diet. Please follow up with your cardiologist as previously scheduled.  Secondary Diagnosis:	Chronic kidney disease (CKD), stage III (moderate)  Goal:	follow up with your nephrologist  Secondary Diagnosis:	Coronary artery disease involving native coronary artery of native heart without angina pectoris  Goal:	follow up with your cardiologist  Assessment and plan of treatment:	Please continue to take your blood pressure medications as prescribed and follow a low salt diet. Please follow up with your cardiologist as previously scheduled. Principal Discharge DX:	Acute gout of right foot, unspecified cause  Goal:	follow up with Rheumatologist  Assessment and plan of treatment:	You were admitted to the hospital because of a gout attack in your ankles, especially your right ankle. Imaging of your ankles did not show any infection however did show non-specific changes that are associated with inflammatory arthritis. You were started on a medication that is used for an acute gout flare called colchicine which helped your symptoms. Please continue to take this for the next 2 days as well as the allopurinol every day. Please follow up with your previous Rheumatologist or with the new clinic at [phone] within 1 week of discharge for further monitoring and/or testing as well as treatment for gout.  Secondary Diagnosis:	Type 2 diabetes mellitus with other specified complication, with long-term current use of insulin  Goal:	follow up with your endocrinologist  Assessment and plan of treatment:	Please continue to take use your insulin as prescribed and follow up with your endocrinologist for further monitoring. Your A1C while you were admitted was 9.2 which means your blood sugar is uncontrolled. Please continue to check your blood sugar with every meal and follow a low carbohydrate diet.  Secondary Diagnosis:	Essential hypertension  Goal:	follow up with your cardiologist  Assessment and plan of treatment:	Please continue to take your blood pressure medications as prescribed and follow a low salt diet. Please follow up with your cardiologist as previously scheduled.  Secondary Diagnosis:	Chronic systolic heart failure  Goal:	follow up with your cardiologist  Assessment and plan of treatment:	Please continue to take your medications as prescribed and follow a low salt diet. Please follow up with your cardiologist as previously scheduled. If you experience any increased leg swelling, shortness of breath, chest pain, palpitations please contact your doctor immediately.  Secondary Diagnosis:	Chronic kidney disease (CKD), stage III (moderate)  Goal:	follow up with your nephrologist  Assessment and plan of treatment:	Please follow up with your nephrologist for further monitoring of your kidney function. Please monitor for any changes in urination including decreased urination, change in color or blood in your urine.  Secondary Diagnosis:	Coronary artery disease involving native coronary artery of native heart without angina pectoris  Goal:	follow up with your cardiologist  Assessment and plan of treatment:	Please continue to take your blood pressure medications as prescribed and follow a low salt diet. Please follow up with your cardiologist as previously scheduled.

## 2018-04-13 NOTE — DISCHARGE NOTE ADULT - INSTRUCTIONS
Please continue to follow a low salt, low cholesterol and low carbohydrate diet. Please also try to avoid the foods on the list provided to you including but not limited to: red meat, alcohol, seafood, sugary beverages.

## 2018-04-13 NOTE — PROGRESS NOTE ADULT - PROBLEM SELECTOR PLAN 5
- last TTE in May 2017 with EF 46%, moderate segmental LVSD, cardiomems in place  - appears euvolemic on exam, no crackles or edema aside from ankle swelling (nonpitting) and pt able to lie flat  - c/w hydralazine, isordil, carvedilol  - holding lasix in setting of possible acute gout flare  - monitor I/Os, daily weights

## 2018-04-14 LAB — ANA TITR SER: NEGATIVE — SIGNIFICANT CHANGE UP

## 2018-04-16 LAB — HLA-B27 QL: SIGNIFICANT CHANGE UP

## 2018-04-17 LAB
BACTERIA BLD CULT: SIGNIFICANT CHANGE UP
BACTERIA BLD CULT: SIGNIFICANT CHANGE UP

## 2018-04-23 ENCOUNTER — APPOINTMENT (OUTPATIENT)
Dept: ENDOCRINOLOGY | Facility: CLINIC | Age: 58
End: 2018-04-23
Payer: COMMERCIAL

## 2018-04-23 VITALS
BODY MASS INDEX: 36.12 KG/M2 | SYSTOLIC BLOOD PRESSURE: 132 MMHG | WEIGHT: 184 LBS | HEART RATE: 119 BPM | HEIGHT: 60 IN | OXYGEN SATURATION: 98 % | DIASTOLIC BLOOD PRESSURE: 80 MMHG

## 2018-04-23 PROCEDURE — 99215 OFFICE O/P EST HI 40 MIN: CPT

## 2018-04-23 RX ORDER — OFLOXACIN 3 MG/ML
0.3 SOLUTION/ DROPS OPHTHALMIC
Qty: 10 | Refills: 0 | Status: DISCONTINUED | COMMUNITY
Start: 2017-02-13 | End: 2018-04-23

## 2018-04-23 RX ORDER — PREDNISOLONE ACETATE 10 MG/ML
1 SUSPENSION/ DROPS OPHTHALMIC
Qty: 10 | Refills: 0 | Status: DISCONTINUED | COMMUNITY
Start: 2017-02-13 | End: 2018-04-23

## 2018-04-27 ENCOUNTER — APPOINTMENT (OUTPATIENT)
Dept: INTERNAL MEDICINE | Facility: CLINIC | Age: 58
End: 2018-04-27
Payer: COMMERCIAL

## 2018-04-27 VITALS — HEART RATE: 104 BPM | SYSTOLIC BLOOD PRESSURE: 140 MMHG | TEMPERATURE: 98.2 F | DIASTOLIC BLOOD PRESSURE: 84 MMHG

## 2018-04-27 PROCEDURE — 99214 OFFICE O/P EST MOD 30 MIN: CPT

## 2018-04-27 RX ORDER — MOXIFLOXACIN OPHTHALMIC 5 MG/ML
0.5 SOLUTION/ DROPS OPHTHALMIC 4 TIMES DAILY
Qty: 1 | Refills: 2 | Status: DISCONTINUED | COMMUNITY
Start: 2017-07-26 | End: 2018-04-27

## 2018-04-27 RX ORDER — CYCLOBENZAPRINE HYDROCHLORIDE 10 MG/1
10 TABLET, FILM COATED ORAL
Qty: 10 | Refills: 0 | Status: DISCONTINUED | COMMUNITY
Start: 2018-01-29 | End: 2018-04-27

## 2018-04-27 RX ORDER — NAPROXEN 500 MG/1
500 TABLET ORAL
Qty: 20 | Refills: 0 | Status: DISCONTINUED | COMMUNITY
Start: 2018-01-29 | End: 2018-04-27

## 2018-05-04 ENCOUNTER — APPOINTMENT (OUTPATIENT)
Dept: RHEUMATOLOGY | Facility: CLINIC | Age: 58
End: 2018-05-04
Payer: COMMERCIAL

## 2018-05-04 VITALS
SYSTOLIC BLOOD PRESSURE: 126 MMHG | TEMPERATURE: 99.3 F | DIASTOLIC BLOOD PRESSURE: 71 MMHG | OXYGEN SATURATION: 98 % | BODY MASS INDEX: 36.71 KG/M2 | HEART RATE: 110 BPM | WEIGHT: 187 LBS | HEIGHT: 60 IN

## 2018-05-04 PROCEDURE — 99214 OFFICE O/P EST MOD 30 MIN: CPT

## 2018-05-15 ENCOUNTER — APPOINTMENT (OUTPATIENT)
Dept: INTERNAL MEDICINE | Facility: CLINIC | Age: 58
End: 2018-05-15

## 2018-05-15 ENCOUNTER — TRANSCRIPTION ENCOUNTER (OUTPATIENT)
Age: 58
End: 2018-05-15

## 2018-05-15 ENCOUNTER — RX RENEWAL (OUTPATIENT)
Age: 58
End: 2018-05-15

## 2018-05-15 ENCOUNTER — MEDICATION RENEWAL (OUTPATIENT)
Age: 58
End: 2018-05-15

## 2018-05-16 ENCOUNTER — TRANSCRIPTION ENCOUNTER (OUTPATIENT)
Age: 58
End: 2018-05-16

## 2018-05-18 LAB
25(OH)D3 SERPL-MCNC: 39.4 NG/ML
ALBUMIN SERPL ELPH-MCNC: 3.7 G/DL
ALP BLD-CCNC: 103 U/L
ALT SERPL-CCNC: 12 U/L
ANION GAP SERPL CALC-SCNC: 18 MMOL/L
APPEARANCE: CLEAR
AST SERPL-CCNC: 11 U/L
BACTERIA: NEGATIVE
BASOPHILS # BLD AUTO: 0.02 K/UL
BASOPHILS NFR BLD AUTO: 0.3 %
BILIRUB SERPL-MCNC: 0.2 MG/DL
BILIRUBIN URINE: NEGATIVE
BLOOD URINE: NEGATIVE
BUN SERPL-MCNC: 33 MG/DL
CALCIUM SERPL-MCNC: 9.2 MG/DL
CHLORIDE SERPL-SCNC: 103 MMOL/L
CO2 SERPL-SCNC: 23 MMOL/L
COLOR: YELLOW
CREAT SERPL-MCNC: 1.62 MG/DL
CREAT SPEC-SCNC: 101 MG/DL
CREAT/PROT UR: 1.7 RATIO
CRP SERPL-MCNC: 1.5 MG/DL
EOSINOPHIL # BLD AUTO: 0.4 K/UL
EOSINOPHIL NFR BLD AUTO: 6.1 %
ERYTHROCYTE [SEDIMENTATION RATE] IN BLOOD BY WESTERGREN METHOD: 54 MM/HR
GLUCOSE QUALITATIVE U: 1000 MG/DL
GLUCOSE SERPL-MCNC: 114 MG/DL
HCT VFR BLD CALC: 30.6 %
HGB BLD-MCNC: 9.6 G/DL
HYALINE CASTS: 4 /LPF
IMM GRANULOCYTES NFR BLD AUTO: 0.2 %
KETONES URINE: NEGATIVE
LEUKOCYTE ESTERASE URINE: NEGATIVE
LYMPHOCYTES # BLD AUTO: 2.48 K/UL
LYMPHOCYTES NFR BLD AUTO: 37.9 %
MAN DIFF?: NORMAL
MCHC RBC-ENTMCNC: 28.2 PG
MCHC RBC-ENTMCNC: 31.4 GM/DL
MCV RBC AUTO: 89.7 FL
MICROSCOPIC-UA: NORMAL
MONOCYTES # BLD AUTO: 0.51 K/UL
MONOCYTES NFR BLD AUTO: 7.8 %
NEUTROPHILS # BLD AUTO: 3.12 K/UL
NEUTROPHILS NFR BLD AUTO: 47.7 %
NITRITE URINE: NEGATIVE
PH URINE: 5.5
PLATELET # BLD AUTO: 319 K/UL
POTASSIUM SERPL-SCNC: 4.8 MMOL/L
PROT SERPL-MCNC: 7 G/DL
PROT UR-MCNC: 175 MG/DL
PROTEIN URINE: 300 MG/DL
RBC # BLD: 3.41 M/UL
RBC # FLD: 15 %
RED BLOOD CELLS URINE: 4 /HPF
SODIUM SERPL-SCNC: 144 MMOL/L
SPECIFIC GRAVITY URINE: 1.02
SQUAMOUS EPITHELIAL CELLS: 1 /HPF
URATE SERPL-MCNC: 6.5 MG/DL
UROBILINOGEN URINE: NEGATIVE MG/DL
WBC # FLD AUTO: 6.54 K/UL
WHITE BLOOD CELLS URINE: 1 /HPF

## 2018-05-22 ENCOUNTER — TRANSCRIPTION ENCOUNTER (OUTPATIENT)
Age: 58
End: 2018-05-22

## 2018-05-22 ENCOUNTER — MEDICATION RENEWAL (OUTPATIENT)
Age: 58
End: 2018-05-22

## 2018-05-25 ENCOUNTER — APPOINTMENT (OUTPATIENT)
Dept: ENDOCRINOLOGY | Facility: CLINIC | Age: 58
End: 2018-05-25
Payer: COMMERCIAL

## 2018-05-25 PROCEDURE — 95250 CONT GLUC MNTR PHYS/QHP EQP: CPT

## 2018-05-25 PROCEDURE — 95251 CONT GLUC MNTR ANALYSIS I&R: CPT

## 2018-05-25 PROCEDURE — G0108 DIAB MANAGE TRN  PER INDIV: CPT

## 2018-05-28 ENCOUNTER — OTHER (OUTPATIENT)
Age: 58
End: 2018-05-28

## 2018-05-29 ENCOUNTER — APPOINTMENT (OUTPATIENT)
Dept: NEPHROLOGY | Facility: CLINIC | Age: 58
End: 2018-05-29
Payer: COMMERCIAL

## 2018-05-29 VITALS
HEART RATE: 88 BPM | HEIGHT: 60 IN | OXYGEN SATURATION: 96 % | SYSTOLIC BLOOD PRESSURE: 104 MMHG | DIASTOLIC BLOOD PRESSURE: 60 MMHG | BODY MASS INDEX: 37.66 KG/M2 | WEIGHT: 191.8 LBS

## 2018-05-29 PROCEDURE — 99214 OFFICE O/P EST MOD 30 MIN: CPT

## 2018-06-07 ENCOUNTER — CLINICAL ADVICE (OUTPATIENT)
Age: 58
End: 2018-06-07

## 2018-06-10 ENCOUNTER — EMERGENCY (EMERGENCY)
Facility: HOSPITAL | Age: 58
LOS: 1 days | Discharge: HOME | End: 2018-06-10
Attending: EMERGENCY MEDICINE | Admitting: EMERGENCY MEDICINE
Payer: COMMERCIAL

## 2018-06-10 VITALS
HEART RATE: 102 BPM | DIASTOLIC BLOOD PRESSURE: 114 MMHG | RESPIRATION RATE: 16 BRPM | OXYGEN SATURATION: 100 % | SYSTOLIC BLOOD PRESSURE: 168 MMHG | TEMPERATURE: 102 F

## 2018-06-10 DIAGNOSIS — Z95.5 PRESENCE OF CORONARY ANGIOPLASTY IMPLANT AND GRAFT: Chronic | ICD-10-CM

## 2018-06-10 DIAGNOSIS — Z98.89 OTHER SPECIFIED POSTPROCEDURAL STATES: Chronic | ICD-10-CM

## 2018-06-10 DIAGNOSIS — E11.319 TYPE 2 DIABETES MELLITUS WITH UNSPECIFIED DIABETIC RETINOPATHY WITHOUT MACULAR EDEMA: Chronic | ICD-10-CM

## 2018-06-10 DIAGNOSIS — I50.9 HEART FAILURE, UNSPECIFIED: Chronic | ICD-10-CM

## 2018-06-10 LAB
ALBUMIN SERPL ELPH-MCNC: 3.9 G/DL — SIGNIFICANT CHANGE UP (ref 3.3–5)
ALP SERPL-CCNC: 115 U/L — SIGNIFICANT CHANGE UP (ref 40–120)
ALT FLD-CCNC: 13 U/L — SIGNIFICANT CHANGE UP (ref 4–41)
APPEARANCE UR: SIGNIFICANT CHANGE UP
AST SERPL-CCNC: 18 U/L — SIGNIFICANT CHANGE UP (ref 4–40)
BACTERIA # UR AUTO: SIGNIFICANT CHANGE UP
BASE EXCESS BLDV CALC-SCNC: -1.3 MMOL/L — SIGNIFICANT CHANGE UP
BASOPHILS # BLD AUTO: 0.03 K/UL — SIGNIFICANT CHANGE UP (ref 0–0.2)
BASOPHILS NFR BLD AUTO: 0.2 % — SIGNIFICANT CHANGE UP (ref 0–2)
BILIRUB SERPL-MCNC: 0.6 MG/DL — SIGNIFICANT CHANGE UP (ref 0.2–1.2)
BILIRUB UR-MCNC: NEGATIVE — SIGNIFICANT CHANGE UP
BLOOD GAS VENOUS - CREATININE: 2.04 MG/DL — HIGH (ref 0.5–1.3)
BLOOD UR QL VISUAL: HIGH
BUN SERPL-MCNC: 40 MG/DL — HIGH (ref 7–23)
CALCIUM SERPL-MCNC: 8.8 MG/DL — SIGNIFICANT CHANGE UP (ref 8.4–10.5)
CHLORIDE BLDV-SCNC: 107 MMOL/L — SIGNIFICANT CHANGE UP (ref 96–108)
CHLORIDE SERPL-SCNC: 98 MMOL/L — SIGNIFICANT CHANGE UP (ref 98–107)
CO2 SERPL-SCNC: 20 MMOL/L — LOW (ref 22–31)
COLOR SPEC: YELLOW — SIGNIFICANT CHANGE UP
CREAT SERPL-MCNC: 2 MG/DL — HIGH (ref 0.5–1.3)
EOSINOPHIL # BLD AUTO: 0.01 K/UL — SIGNIFICANT CHANGE UP (ref 0–0.5)
EOSINOPHIL NFR BLD AUTO: 0.1 % — SIGNIFICANT CHANGE UP (ref 0–6)
ERYTHROCYTE [SEDIMENTATION RATE] IN BLOOD: 108 MM/HR — HIGH (ref 1–15)
GAS PNL BLDV: 135 MMOL/L — LOW (ref 136–146)
GLUCOSE BLDV-MCNC: 120 — HIGH (ref 70–99)
GLUCOSE SERPL-MCNC: 120 MG/DL — HIGH (ref 70–99)
GLUCOSE UR-MCNC: SIGNIFICANT CHANGE UP
GRAN CASTS # UR COMP ASSIST: SIGNIFICANT CHANGE UP
HBA1C BLD-MCNC: 8.1 % — HIGH (ref 4–5.6)
HCO3 BLDV-SCNC: 23 MMOL/L — SIGNIFICANT CHANGE UP (ref 20–27)
HCT VFR BLD CALC: 29.1 % — LOW (ref 39–50)
HCT VFR BLDV CALC: 31.8 % — LOW (ref 39–51)
HGB BLD-MCNC: 9.8 G/DL — LOW (ref 13–17)
HGB BLDV-MCNC: 10.3 G/DL — LOW (ref 13–17)
IMM GRANULOCYTES # BLD AUTO: 0.07 # — SIGNIFICANT CHANGE UP
IMM GRANULOCYTES NFR BLD AUTO: 0.6 % — SIGNIFICANT CHANGE UP (ref 0–1.5)
KETONES UR-MCNC: SIGNIFICANT CHANGE UP
LACTATE BLDV-MCNC: 1.4 MMOL/L — SIGNIFICANT CHANGE UP (ref 0.5–2)
LEUKOCYTE ESTERASE UR-ACNC: NEGATIVE — SIGNIFICANT CHANGE UP
LYMPHOCYTES # BLD AUTO: 1.41 K/UL — SIGNIFICANT CHANGE UP (ref 1–3.3)
LYMPHOCYTES # BLD AUTO: 11.7 % — LOW (ref 13–44)
MCHC RBC-ENTMCNC: 29.3 PG — SIGNIFICANT CHANGE UP (ref 27–34)
MCHC RBC-ENTMCNC: 33.7 % — SIGNIFICANT CHANGE UP (ref 32–36)
MCV RBC AUTO: 87.1 FL — SIGNIFICANT CHANGE UP (ref 80–100)
MONOCYTES # BLD AUTO: 1.22 K/UL — HIGH (ref 0–0.9)
MONOCYTES NFR BLD AUTO: 10.1 % — SIGNIFICANT CHANGE UP (ref 2–14)
MUCOUS THREADS # UR AUTO: SIGNIFICANT CHANGE UP
NEUTROPHILS # BLD AUTO: 9.32 K/UL — HIGH (ref 1.8–7.4)
NEUTROPHILS NFR BLD AUTO: 77.3 % — HIGH (ref 43–77)
NITRITE UR-MCNC: NEGATIVE — SIGNIFICANT CHANGE UP
NRBC # FLD: 0 — SIGNIFICANT CHANGE UP
PCO2 BLDV: 36 MMHG — LOW (ref 41–51)
PH BLDV: 7.42 PH — SIGNIFICANT CHANGE UP (ref 7.32–7.43)
PH UR: 6 — SIGNIFICANT CHANGE UP (ref 4.6–8)
PLATELET # BLD AUTO: 171 K/UL — SIGNIFICANT CHANGE UP (ref 150–400)
PMV BLD: 10.4 FL — SIGNIFICANT CHANGE UP (ref 7–13)
PO2 BLDV: 37 MMHG — SIGNIFICANT CHANGE UP (ref 35–40)
POTASSIUM BLDV-SCNC: 3.7 MMOL/L — SIGNIFICANT CHANGE UP (ref 3.4–4.5)
POTASSIUM SERPL-MCNC: 4 MMOL/L — SIGNIFICANT CHANGE UP (ref 3.5–5.3)
POTASSIUM SERPL-SCNC: 4 MMOL/L — SIGNIFICANT CHANGE UP (ref 3.5–5.3)
PROT SERPL-MCNC: 7.6 G/DL — SIGNIFICANT CHANGE UP (ref 6–8.3)
PROT UR-MCNC: 500 MG/DL — HIGH
RBC # BLD: 3.34 M/UL — LOW (ref 4.2–5.8)
RBC # FLD: 15 % — HIGH (ref 10.3–14.5)
RBC CASTS # UR COMP ASSIST: SIGNIFICANT CHANGE UP (ref 0–?)
SAO2 % BLDV: 68.4 % — SIGNIFICANT CHANGE UP (ref 60–85)
SODIUM SERPL-SCNC: 136 MMOL/L — SIGNIFICANT CHANGE UP (ref 135–145)
SP GR SPEC: 1.01 — SIGNIFICANT CHANGE UP (ref 1–1.04)
SQUAMOUS # UR AUTO: SIGNIFICANT CHANGE UP
URATE SERPL-MCNC: 13.5 MG/DL — HIGH (ref 3.4–8.8)
UROBILINOGEN FLD QL: NORMAL MG/DL — SIGNIFICANT CHANGE UP
WBC # BLD: 12.06 K/UL — HIGH (ref 3.8–10.5)
WBC # FLD AUTO: 12.06 K/UL — HIGH (ref 3.8–10.5)
WBC UR QL: SIGNIFICANT CHANGE UP (ref 0–?)

## 2018-06-10 PROCEDURE — 73630 X-RAY EXAM OF FOOT: CPT | Mod: 26,RT

## 2018-06-10 PROCEDURE — 73130 X-RAY EXAM OF HAND: CPT | Mod: 26,LT

## 2018-06-10 PROCEDURE — 71046 X-RAY EXAM CHEST 2 VIEWS: CPT | Mod: 26

## 2018-06-10 PROCEDURE — 99220: CPT

## 2018-06-10 RX ORDER — COLCHICINE 0.6 MG
0.6 TABLET ORAL ONCE
Qty: 0 | Refills: 0 | Status: DISCONTINUED | OUTPATIENT
Start: 2018-06-10 | End: 2018-06-10

## 2018-06-10 RX ORDER — SODIUM CHLORIDE 9 MG/ML
500 INJECTION INTRAMUSCULAR; INTRAVENOUS; SUBCUTANEOUS ONCE
Qty: 0 | Refills: 0 | Status: COMPLETED | OUTPATIENT
Start: 2018-06-10 | End: 2018-06-10

## 2018-06-10 RX ORDER — INSULIN GLARGINE 100 [IU]/ML
55 INJECTION, SOLUTION SUBCUTANEOUS AT BEDTIME
Qty: 0 | Refills: 0 | Status: DISCONTINUED | OUTPATIENT
Start: 2018-06-10 | End: 2018-06-14

## 2018-06-10 RX ORDER — GLUCAGON INJECTION, SOLUTION 0.5 MG/.1ML
1 INJECTION, SOLUTION SUBCUTANEOUS ONCE
Qty: 0 | Refills: 0 | Status: DISCONTINUED | OUTPATIENT
Start: 2018-06-10 | End: 2018-06-14

## 2018-06-10 RX ORDER — INSULIN LISPRO 100/ML
VIAL (ML) SUBCUTANEOUS
Qty: 0 | Refills: 0 | Status: DISCONTINUED | OUTPATIENT
Start: 2018-06-10 | End: 2018-06-14

## 2018-06-10 RX ORDER — LORATADINE 10 MG/1
10 TABLET ORAL DAILY
Qty: 0 | Refills: 0 | Status: DISCONTINUED | OUTPATIENT
Start: 2018-06-10 | End: 2018-06-14

## 2018-06-10 RX ORDER — FUROSEMIDE 40 MG
40 TABLET ORAL DAILY
Qty: 0 | Refills: 0 | Status: DISCONTINUED | OUTPATIENT
Start: 2018-06-10 | End: 2018-06-14

## 2018-06-10 RX ORDER — CEFTRIAXONE 500 MG/1
1 INJECTION, POWDER, FOR SOLUTION INTRAMUSCULAR; INTRAVENOUS ONCE
Qty: 0 | Refills: 0 | Status: COMPLETED | OUTPATIENT
Start: 2018-06-10 | End: 2018-06-10

## 2018-06-10 RX ORDER — ATORVASTATIN CALCIUM 80 MG/1
80 TABLET, FILM COATED ORAL AT BEDTIME
Qty: 0 | Refills: 0 | Status: DISCONTINUED | OUTPATIENT
Start: 2018-06-10 | End: 2018-06-14

## 2018-06-10 RX ORDER — DEXTROSE 50 % IN WATER 50 %
12.5 SYRINGE (ML) INTRAVENOUS ONCE
Qty: 0 | Refills: 0 | Status: DISCONTINUED | OUTPATIENT
Start: 2018-06-10 | End: 2018-06-14

## 2018-06-10 RX ORDER — HYDRALAZINE HCL 50 MG
75 TABLET ORAL THREE TIMES A DAY
Qty: 0 | Refills: 0 | Status: DISCONTINUED | OUTPATIENT
Start: 2018-06-10 | End: 2018-06-14

## 2018-06-10 RX ORDER — DEXTROSE 50 % IN WATER 50 %
25 SYRINGE (ML) INTRAVENOUS ONCE
Qty: 0 | Refills: 0 | Status: DISCONTINUED | OUTPATIENT
Start: 2018-06-10 | End: 2018-06-14

## 2018-06-10 RX ORDER — OXYCODONE AND ACETAMINOPHEN 5; 325 MG/1; MG/1
1 TABLET ORAL ONCE
Qty: 0 | Refills: 0 | Status: DISCONTINUED | OUTPATIENT
Start: 2018-06-10 | End: 2018-06-10

## 2018-06-10 RX ORDER — GABAPENTIN 400 MG/1
300 CAPSULE ORAL THREE TIMES A DAY
Qty: 0 | Refills: 0 | Status: DISCONTINUED | OUTPATIENT
Start: 2018-06-10 | End: 2018-06-14

## 2018-06-10 RX ORDER — CARVEDILOL PHOSPHATE 80 MG/1
12.5 CAPSULE, EXTENDED RELEASE ORAL EVERY 12 HOURS
Qty: 0 | Refills: 0 | Status: DISCONTINUED | OUTPATIENT
Start: 2018-06-10 | End: 2018-06-14

## 2018-06-10 RX ORDER — INSULIN LISPRO 100/ML
12 VIAL (ML) SUBCUTANEOUS
Qty: 0 | Refills: 0 | Status: DISCONTINUED | OUTPATIENT
Start: 2018-06-10 | End: 2018-06-14

## 2018-06-10 RX ORDER — TICAGRELOR 90 MG/1
90 TABLET ORAL
Qty: 0 | Refills: 0 | Status: DISCONTINUED | OUTPATIENT
Start: 2018-06-10 | End: 2018-06-14

## 2018-06-10 RX ORDER — ASPIRIN/CALCIUM CARB/MAGNESIUM 324 MG
81 TABLET ORAL DAILY
Qty: 0 | Refills: 0 | Status: DISCONTINUED | OUTPATIENT
Start: 2018-06-10 | End: 2018-06-14

## 2018-06-10 RX ORDER — TAMSULOSIN HYDROCHLORIDE 0.4 MG/1
0.4 CAPSULE ORAL AT BEDTIME
Qty: 0 | Refills: 0 | Status: DISCONTINUED | OUTPATIENT
Start: 2018-06-10 | End: 2018-06-14

## 2018-06-10 RX ORDER — COLCHICINE 0.6 MG
0.6 TABLET ORAL ONCE
Qty: 0 | Refills: 0 | Status: COMPLETED | OUTPATIENT
Start: 2018-06-10 | End: 2018-06-10

## 2018-06-10 RX ORDER — ISOSORBIDE DINITRATE 5 MG/1
20 TABLET ORAL THREE TIMES A DAY
Qty: 0 | Refills: 0 | Status: DISCONTINUED | OUTPATIENT
Start: 2018-06-10 | End: 2018-06-14

## 2018-06-10 RX ORDER — ACETAMINOPHEN 500 MG
975 TABLET ORAL ONCE
Qty: 0 | Refills: 0 | Status: COMPLETED | OUTPATIENT
Start: 2018-06-10 | End: 2018-06-10

## 2018-06-10 RX ORDER — DEXTROSE 50 % IN WATER 50 %
15 SYRINGE (ML) INTRAVENOUS ONCE
Qty: 0 | Refills: 0 | Status: DISCONTINUED | OUTPATIENT
Start: 2018-06-10 | End: 2018-06-14

## 2018-06-10 RX ORDER — SODIUM CHLORIDE 9 MG/ML
1000 INJECTION, SOLUTION INTRAVENOUS
Qty: 0 | Refills: 0 | Status: DISCONTINUED | OUTPATIENT
Start: 2018-06-10 | End: 2018-06-14

## 2018-06-10 RX ORDER — INSULIN LISPRO 100/ML
VIAL (ML) SUBCUTANEOUS AT BEDTIME
Qty: 0 | Refills: 0 | Status: DISCONTINUED | OUTPATIENT
Start: 2018-06-10 | End: 2018-06-14

## 2018-06-10 RX ORDER — PANTOPRAZOLE SODIUM 20 MG/1
40 TABLET, DELAYED RELEASE ORAL
Qty: 0 | Refills: 0 | Status: DISCONTINUED | OUTPATIENT
Start: 2018-06-10 | End: 2018-06-14

## 2018-06-10 RX ADMIN — INSULIN GLARGINE 55 UNIT(S): 100 INJECTION, SOLUTION SUBCUTANEOUS at 22:20

## 2018-06-10 RX ADMIN — Medication 4: at 13:54

## 2018-06-10 RX ADMIN — SODIUM CHLORIDE 500 MILLILITER(S): 9 INJECTION INTRAMUSCULAR; INTRAVENOUS; SUBCUTANEOUS at 19:53

## 2018-06-10 RX ADMIN — Medication 975 MILLIGRAM(S): at 08:36

## 2018-06-10 RX ADMIN — Medication 1: at 18:20

## 2018-06-10 RX ADMIN — GABAPENTIN 300 MILLIGRAM(S): 400 CAPSULE ORAL at 17:20

## 2018-06-10 RX ADMIN — Medication 0.6 MILLIGRAM(S): at 09:30

## 2018-06-10 RX ADMIN — CEFTRIAXONE 100 GRAM(S): 500 INJECTION, POWDER, FOR SOLUTION INTRAMUSCULAR; INTRAVENOUS at 09:30

## 2018-06-10 RX ADMIN — CARVEDILOL PHOSPHATE 12.5 MILLIGRAM(S): 80 CAPSULE, EXTENDED RELEASE ORAL at 15:25

## 2018-06-10 RX ADMIN — LORATADINE 10 MILLIGRAM(S): 10 TABLET ORAL at 15:25

## 2018-06-10 RX ADMIN — Medication 100 MILLIGRAM(S): at 19:54

## 2018-06-10 RX ADMIN — Medication 20 MILLIGRAM(S): at 13:52

## 2018-06-10 RX ADMIN — Medication 40 MILLIGRAM(S): at 15:25

## 2018-06-10 RX ADMIN — PANTOPRAZOLE SODIUM 40 MILLIGRAM(S): 20 TABLET, DELAYED RELEASE ORAL at 15:24

## 2018-06-10 RX ADMIN — Medication 100 MILLIGRAM(S): at 11:58

## 2018-06-10 RX ADMIN — ISOSORBIDE DINITRATE 20 MILLIGRAM(S): 5 TABLET ORAL at 22:21

## 2018-06-10 RX ADMIN — OXYCODONE AND ACETAMINOPHEN 1 TABLET(S): 5; 325 TABLET ORAL at 17:51

## 2018-06-10 RX ADMIN — SODIUM CHLORIDE 1000 MILLILITER(S): 9 INJECTION INTRAMUSCULAR; INTRAVENOUS; SUBCUTANEOUS at 08:36

## 2018-06-10 RX ADMIN — Medication 20 MILLIGRAM(S): at 11:58

## 2018-06-10 RX ADMIN — Medication 75 MILLIGRAM(S): at 22:21

## 2018-06-10 RX ADMIN — OXYCODONE AND ACETAMINOPHEN 1 TABLET(S): 5; 325 TABLET ORAL at 17:20

## 2018-06-10 RX ADMIN — GABAPENTIN 300 MILLIGRAM(S): 400 CAPSULE ORAL at 22:21

## 2018-06-10 RX ADMIN — ATORVASTATIN CALCIUM 80 MILLIGRAM(S): 80 TABLET, FILM COATED ORAL at 22:21

## 2018-06-10 RX ADMIN — TAMSULOSIN HYDROCHLORIDE 0.4 MILLIGRAM(S): 0.4 CAPSULE ORAL at 22:21

## 2018-06-10 RX ADMIN — Medication 12 UNIT(S): at 18:21

## 2018-06-10 RX ADMIN — Medication 81 MILLIGRAM(S): at 15:24

## 2018-06-10 RX ADMIN — Medication 975 MILLIGRAM(S): at 10:07

## 2018-06-10 RX ADMIN — Medication 12 UNIT(S): at 13:55

## 2018-06-10 RX ADMIN — TICAGRELOR 90 MILLIGRAM(S): 90 TABLET ORAL at 15:26

## 2018-06-10 NOTE — ED ADULT NURSE NOTE - OBJECTIVE STATEMENT
58y M AaOx3 c/o bilateral ankle pain. pt states he has been having inflammation and ankle pain x1 month. pt states his pain is sharp non radiating that is warm to touch. +2 pedal/post tib pulses present. pt shows some ecchymosis in medial portion of both ankles. pt shows unhealing wound on the bottom. pt febrile 102.5 rectally. pt has hx of gout DM and htn. pt has not had his proper gout medications for over a month now. pt labs collected and sent. pt to be medicated as per md order

## 2018-06-10 NOTE — ED CDU PROVIDER INITIAL DAY NOTE - SKIN WOUND TYPE
old ulcer to plantar of right foot. No swelling or erythema to ankles. No warmth on palpation. Erythema and swelling right hand.

## 2018-06-10 NOTE — ED CDU PROVIDER INITIAL DAY NOTE - OBJECTIVE STATEMENT
57 y/o male with PMHx of CAD s/p stent, CKD, DM, HLD, HTN, GERD, and Gout presents to ED for b/l ankle pain X 1 month. Pt states over the past month having increased pain to b/l ankles since running out of his Colchicine. Pt notes having similar pain 2 months ago when he was given colchicine with improvement. Pt denies any falls or trauma to the area. Pt admits to a fever of 102 yesterday as well as some redness to his left over the past week. Pt denies any nausea, vomiting, SOB, chest pain, or abd pain.

## 2018-06-10 NOTE — ED PROVIDER NOTE - MEDICAL DECISION MAKING DETAILS
58M with CHF on diuretics and gout currently off of medication presenting with polyarticular joint pain suspicious for gout vs infection. Patient with wound on bottom of foot, patient unaware. Will dose IVF, ceftriraxone, check labs, provide colchicine and tylenol. Plan to reassess.

## 2018-06-10 NOTE — ED CDU PROVIDER INITIAL DAY NOTE - MEDICAL DECISION MAKING DETAILS
59 y/o male with b/l ankle pain with history of gout and similar symptoms out of his colchine X 1 month, as well as swelling and redness to left hand. Concern for gout to b/l ankles and cellulitis to left hand with fever. Sent to [facility] for IV antibiotics and pain control.

## 2018-06-10 NOTE — ED CDU PROVIDER INITIAL DAY NOTE - PROGRESS NOTE DETAILS
maddie taylor: pt examined at bedside, states that the pain is a bit better, swelling + erythema to left hand is getting better, we will continue iv abx, steroids, monitor for fever, pain control. will give small amount of fluids as pt has mild elevation in creatnine and monitor

## 2018-06-10 NOTE — ED ADULT TRIAGE NOTE - CHIEF COMPLAINT QUOTE
Pt arrives w/ c/o bilateral ankle pain and swelling for last 2 months worsening today. Unable to ambulate.

## 2018-06-10 NOTE — ED PROVIDER NOTE - OBJECTIVE STATEMENT
58M HTN, HLD, uncontrolled DM2 (with neuropathy, retinopathy, nephropathy), CAD (s/p 4 stents), CHF (with cardiomems), CKD stage 3, gout, presents to the ED c/o [id number] 58M HTN, HLD, uncontrolled DM2 (with neuropathy, retinopathy, nephropathy), CAD (s/p 4 stents), CHF (with cardiomems), CKD stage 3, gout, presents to the ED c/o joint pain. Patient states that he takes lasix 40mg daily for CHF and was recently seen in the hospital, admitted, for gout 2 months ago. He was prescribed colchicine but ran out 1 month ago because "I couldn't get in touch with my doctor". Over the last couple of weeks, the patient has been describing pain in the  left hand, RLE ankle and LLE big toe, off and on pain, currently 9/10 in pain which hinders ambulation. Patient came to the emergency room because he developed a fever at home to 102F and "the pain was too bad and I didn't know what to take for the pain". Patient denies red meat or alcohol use.

## 2018-06-10 NOTE — ED PROVIDER NOTE - PROGRESS NOTE DETAILS
Patient given tylenol and colchicine. Will re-assess pain. T 102 rectally. WBC 12 with . Will f/u hand imaging Rec'd signout on this pt from Dr. Fletcher.  59yo M w/ hx of gout/podagra, c/o right great toe/foot/ankle and dorsal left hand pain with new redness over this area X 1wk, worsening. Fever here. Ran out of colchicine. Pain mngmt, abx for suspected cellulitis of left hand, and cdu to ensure symptoms not worsening. No concern for joint infxn at this time.   -Dr. Mistry

## 2018-06-10 NOTE — ED PROVIDER NOTE - PHYSICAL EXAMINATION
General: WN/WD NAD  Neurology: A&Ox3, nonfocal, LÓPEZ x 4  Eyes: PERRLA/ EOMI, Gross vision intact  ENT/Neck: Neck supple, trachea midline, No JVD, Gross hearing intact  Respiratory: CTA B/L, No wheezing, rales, rhonchi  CV: +tachycardia, +S1/S2, -S3/S4, no murmurs, rubs or gallops  Abdominal: Soft, NT, ND +BS, No HSM  MSK: 5/5 strength UE/LE bilaterally. +RLE ankle pain to palpation, LLE great toe pain  Extremities: No edema, 2+ peripheral pulses  Skin: +welling and erythema of the left 3rd digit with PIP tophi. No Hematoma, Ecchymosis

## 2018-06-10 NOTE — ED ADULT NURSE REASSESSMENT NOTE - NS ED NURSE REASSESS COMMENT FT1
Awake and alert, c/o bilateral feet pain and hand pain, medicated as ordered, will continue to monitor.

## 2018-06-10 NOTE — ED CDU PROVIDER INITIAL DAY NOTE - MUSCULOSKELETAL MINIMAL EXAM
no bony tenderness. Noted to have chronic swelling to left 3rd finger. +distal pulse. FROM and wrists and ankles

## 2018-06-10 NOTE — ED PROVIDER NOTE - NS ED ROS FT
REVIEW OF SYSTEMS:  CONSTITUTIONAL: No weakness. +fevers. No chills  EYES/ENT: No visual changes;  No vertigo or throat pain   NECK: No pain or stiffness  RESPIRATORY: No cough, wheezing, hemoptysis; No shortness of breath  CARDIOVASCULAR: No chest pain or palpitations  GASTROINTESTINAL: No abdominal or epigastric pain. No nausea, vomiting, or hematemesis; No diarrhea or constipation. No melena or hematochezia.  GENITOURINARY: No dysuria, frequency or hematuria  NEUROLOGICAL: No numbness or weakness  MSK: +joint pain. +redness in the left hand  SKIN: No itching, burning, rashes, or lesions   All other review of systems is negative unless indicated above.

## 2018-06-11 VITALS
DIASTOLIC BLOOD PRESSURE: 64 MMHG | RESPIRATION RATE: 16 BRPM | SYSTOLIC BLOOD PRESSURE: 124 MMHG | TEMPERATURE: 98 F | OXYGEN SATURATION: 97 % | HEART RATE: 76 BPM

## 2018-06-11 LAB
BUN SERPL-MCNC: 46 MG/DL — HIGH (ref 7–23)
CALCIUM SERPL-MCNC: 8.6 MG/DL — SIGNIFICANT CHANGE UP (ref 8.4–10.5)
CHLORIDE SERPL-SCNC: 101 MMOL/L — SIGNIFICANT CHANGE UP (ref 98–107)
CO2 SERPL-SCNC: 23 MMOL/L — SIGNIFICANT CHANGE UP (ref 22–31)
CREAT SERPL-MCNC: 1.83 MG/DL — HIGH (ref 0.5–1.3)
GLUCOSE SERPL-MCNC: 255 MG/DL — HIGH (ref 70–99)
POTASSIUM SERPL-MCNC: 3.9 MMOL/L — SIGNIFICANT CHANGE UP (ref 3.5–5.3)
POTASSIUM SERPL-SCNC: 3.9 MMOL/L — SIGNIFICANT CHANGE UP (ref 3.5–5.3)
SODIUM SERPL-SCNC: 139 MMOL/L — SIGNIFICANT CHANGE UP (ref 135–145)
SPECIMEN SOURCE: SIGNIFICANT CHANGE UP

## 2018-06-11 PROCEDURE — 99217: CPT

## 2018-06-11 RX ORDER — ALLOPURINOL 300 MG
1 TABLET ORAL
Qty: 7 | Refills: 0 | OUTPATIENT
Start: 2018-06-11 | End: 2018-06-17

## 2018-06-11 RX ORDER — COLCHICINE 0.6 MG
1 TABLET ORAL
Qty: 3 | Refills: 0 | OUTPATIENT
Start: 2018-06-11 | End: 2018-06-13

## 2018-06-11 RX ADMIN — Medication 12 UNIT(S): at 09:29

## 2018-06-11 RX ADMIN — TICAGRELOR 90 MILLIGRAM(S): 90 TABLET ORAL at 06:31

## 2018-06-11 RX ADMIN — Medication 2: at 09:29

## 2018-06-11 RX ADMIN — GABAPENTIN 300 MILLIGRAM(S): 400 CAPSULE ORAL at 06:31

## 2018-06-11 RX ADMIN — Medication 40 MILLIGRAM(S): at 06:31

## 2018-06-11 RX ADMIN — Medication 100 MILLIGRAM(S): at 03:26

## 2018-06-11 RX ADMIN — PANTOPRAZOLE SODIUM 40 MILLIGRAM(S): 20 TABLET, DELAYED RELEASE ORAL at 06:31

## 2018-06-11 RX ADMIN — CARVEDILOL PHOSPHATE 12.5 MILLIGRAM(S): 80 CAPSULE, EXTENDED RELEASE ORAL at 06:31

## 2018-06-11 NOTE — ED CDU PROVIDER SUBSEQUENT DAY NOTE - ATTENDING CONTRIBUTION TO CARE
I, Jennifer Cabot, MD, have performed a history and physical exam of the patient and discussed their management with the ACP.  I reviewed the PA's note and agree with the documented findings and plan of care. My medical decision making and observations are found above.    Cabot: 58M with gout, not on colchicine or allopurinol at home 2/2 running out, here with bilat ankle gout and L hand cellulitis.  All improving after antibiotics, steroids, colchicine.  Area of cellulitis is much smaller than yesterday (which was marked.)  Ankles with minimal edema, minimal TTP.  Pt able to ambulate and wishes to go home.  Will repeat BMP, and if Cr is stable or improved, will discharge home.

## 2018-06-11 NOTE — ED CDU PROVIDER SUBSEQUENT DAY NOTE - HISTORY
58 y.o male pmh cad s/p stent, ckd, dm, hld, htn, gerd, gout presented yesterday to the ER for b/l ankle pain x 1 month, has not been taking his cholchicine. denies any trauma, fall. also admits to fever of 102 day before and redness/mendel over left middle finger, denies any trauma to the area, denies any headache, neck pain, cough, n/v/d, chest pain, sob, abdominal pain, urinary symptoms, numbness/weakness/tingling/ IN ER pt was found to have cellulitis over left 3-4th finger which has now improved in cdu with abx, pt also receiving prednisone and percocet for ankle pain.

## 2018-06-11 NOTE — ED CDU PROVIDER DISPOSITION NOTE - CLINICAL COURSE
Cabot: 58M with gout, not on colchicine or allopurinol at home 2/2 running out, here with bilat ankle gout and L hand cellulitis.  All improving after antibiotics, steroids, colchicine.  Area of cellulitis is much smaller than yesterday (which was marked.)  Ankles with minimal edema, minimal TTP.  Pt able to ambulate and wishes to go home.  On repeat BMP, Cr is improved. Will discharge home with short course of steroids and low dose colchicine. Cabot: 58M with gout, not on colchicine or allopurinol at home 2/2 running out, here with bilat ankle gout and L hand cellulitis.  All improving after antibiotics, steroids, colchicine.  Area of cellulitis is much smaller than yesterday (which was marked.)  Ankles with minimal edema, minimal TTP.  Pt able to ambulate and wishes to go home.  On repeat BMP, Cr is improved. Will discharge home with short course of steroids and low dose colchicine, as well as clindamycin.

## 2018-06-11 NOTE — ED CDU PROVIDER SUBSEQUENT DAY NOTE - MEDICAL DECISION MAKING DETAILS
gout flare up, elevated creatnine, cellulitis to hand, abx, steroids, reasses gout flare up, elevated creatnine, cellulitis to hand, abx, steroids, reassess

## 2018-06-11 NOTE — ED CDU PROVIDER SUBSEQUENT DAY NOTE - PROGRESS NOTE DETAILS
maddie olivarez: over night, pt reecived steroids, abx, symptoms seem to be improving, pt no c/o pain, will also repeat bmp as pt creatnine was elevated , will reasses Cabot: Patient signed out to me.  58M with gout, not on colchicine or allopurinol at home 2/2 running out, here with bilat ankle gout and L hand cellulitis.  All improving after antibiotics, steroids, colchicine.  Pt able to ambulate and wishes to go home.  Will repeat BMP, and if Cr is stable or improved, will discharge home. Pt signed out to me by DEJA Pickens: 58yM w/pmhx CAD s/p stent, CKD, HLD, HTN, GERD and gout who presented with b/l ankle pain for one month, pt had not been taking colchicine for the past month. Pt also had fever and was found to have left hand cellulitis. This morning pts cellulitis is much improved after antibiotics and pt would like to return home, feels he is able to walk comfortably with a cane (does not use cane at his baseline). AM labs pending, had elevated Creatinine. Discussed with pt improved labs, he agrees to d/c home with PMD follow up, will send clinda, colchicine and allopurinol to his pharmacy Discussed with pt improved labs, he agrees to d/c home with PMD follow up, will send clinda, prednisone, colchicine and allopurinol to his pharmacy

## 2018-06-11 NOTE — ED CDU PROVIDER DISPOSITION NOTE - PLAN OF CARE
Follow up with your primary care provider within 48 hours  Take Clindamycin 300mg (1 tablet) 3 times a day for 7 days  Take allopurinol 100mg (1 tablet) once daily for 7 days  Take colchicine .6mg (1 tablet) once daily for 3 days  Return to the emergency department with any worsening or concerning symptoms, increased pain or swelling, fever, chills, redness spreads beyond marked area or any other concerns. Follow up with your primary care provider within 48 hours  Take Clindamycin 300mg (1 tablet) 3 times a day for 7 days  Take allopurinol 100mg (1 tablet) once daily for 7 days  Take colchicine .6mg (1 tablet) once daily for 3 days  Take Prednisone 40mg (2 tablets) once daily for 4 days  Return to the emergency department with any worsening or concerning symptoms, increased pain or swelling, fever, chills, redness spreads beyond marked area or any other concerns.

## 2018-06-11 NOTE — ED CDU PROVIDER DISPOSITION NOTE - ATTENDING CONTRIBUTION TO CARE
I, Jennifer Cabot, MD, have performed a history and physical exam of the patient and discussed their management with the ACP.  I reviewed the PA's note and agree with the documented findings and plan of care. My medical decision making and observations are found above.    Cabot: 58M with gout, not on colchicine or allopurinol at home 2/2 running out, here with bilat ankle gout and L hand cellulitis.  All improving after antibiotics, steroids, colchicine.  Area of cellulitis is much smaller than yesterday (which was marked.)  Ankles with minimal edema, minimal TTP.  Pt able to ambulate and wishes to go home.  On repeat BMP, Cr is improved. Will discharge home with short course of steroids and low dose colchicine. I, Jennifer Cabot, MD, have performed a history and physical exam of the patient and discussed their management with the ACP.  I reviewed the PA's note and agree with the documented findings and plan of care. My medical decision making and observations are found above.    Cabot: 58M with gout, not on colchicine or allopurinol at home 2/2 running out, here with bilat ankle gout and L hand cellulitis.  All improving after antibiotics, steroids, colchicine.  Area of cellulitis is much smaller than yesterday (which was marked.)  Ankles with minimal edema, minimal TTP.  Pt able to ambulate and wishes to go home.  On repeat BMP, Cr is improved. Will discharge home with short course of steroids and low dose colchicine, as well as clindamycin.

## 2018-06-12 LAB — BACTERIA UR CULT: SIGNIFICANT CHANGE UP

## 2018-06-14 ENCOUNTER — TRANSCRIPTION ENCOUNTER (OUTPATIENT)
Age: 58
End: 2018-06-14

## 2018-06-14 ENCOUNTER — RX RENEWAL (OUTPATIENT)
Age: 58
End: 2018-06-14

## 2018-06-15 LAB
BACTERIA BLD CULT: SIGNIFICANT CHANGE UP
BACTERIA BLD CULT: SIGNIFICANT CHANGE UP

## 2018-06-15 RX ORDER — INSULIN ASPART 100 [IU]/ML
100 INJECTION, SOLUTION INTRAVENOUS; SUBCUTANEOUS
Qty: 10 | Refills: 5 | Status: DISCONTINUED | COMMUNITY
Start: 2018-06-14 | End: 2018-06-15

## 2018-06-19 ENCOUNTER — EMERGENCY (EMERGENCY)
Facility: HOSPITAL | Age: 58
LOS: 1 days | Discharge: HOME | End: 2018-06-19
Attending: EMERGENCY MEDICINE | Admitting: EMERGENCY MEDICINE
Payer: COMMERCIAL

## 2018-06-19 VITALS
HEART RATE: 83 BPM | TEMPERATURE: 99 F | OXYGEN SATURATION: 98 % | RESPIRATION RATE: 18 BRPM | SYSTOLIC BLOOD PRESSURE: 184 MMHG | DIASTOLIC BLOOD PRESSURE: 91 MMHG

## 2018-06-19 VITALS
WEIGHT: 186.95 LBS | HEART RATE: 82 BPM | SYSTOLIC BLOOD PRESSURE: 185 MMHG | DIASTOLIC BLOOD PRESSURE: 92 MMHG | RESPIRATION RATE: 18 BRPM | TEMPERATURE: 98 F | HEIGHT: 67 IN | OXYGEN SATURATION: 98 %

## 2018-06-19 DIAGNOSIS — Z95.5 PRESENCE OF CORONARY ANGIOPLASTY IMPLANT AND GRAFT: Chronic | ICD-10-CM

## 2018-06-19 DIAGNOSIS — Z98.89 OTHER SPECIFIED POSTPROCEDURAL STATES: Chronic | ICD-10-CM

## 2018-06-19 DIAGNOSIS — I50.9 HEART FAILURE, UNSPECIFIED: Chronic | ICD-10-CM

## 2018-06-19 DIAGNOSIS — E11.319 TYPE 2 DIABETES MELLITUS WITH UNSPECIFIED DIABETIC RETINOPATHY WITHOUT MACULAR EDEMA: Chronic | ICD-10-CM

## 2018-06-19 LAB
ALBUMIN SERPL ELPH-MCNC: 2.8 G/DL — LOW (ref 3.3–5)
ALP SERPL-CCNC: 108 U/L — SIGNIFICANT CHANGE UP (ref 40–120)
ALT FLD-CCNC: 34 U/L DA — SIGNIFICANT CHANGE UP (ref 10–45)
ANION GAP SERPL CALC-SCNC: 7 MMOL/L — SIGNIFICANT CHANGE UP (ref 5–17)
AST SERPL-CCNC: 17 U/L — SIGNIFICANT CHANGE UP (ref 10–40)
BASOPHILS # BLD AUTO: 0.1 K/UL — SIGNIFICANT CHANGE UP (ref 0–0.2)
BASOPHILS NFR BLD AUTO: 0.6 % — SIGNIFICANT CHANGE UP (ref 0–2)
BILIRUB SERPL-MCNC: 0.3 MG/DL — SIGNIFICANT CHANGE UP (ref 0.2–1.2)
BUN SERPL-MCNC: 35 MG/DL — HIGH (ref 7–23)
CALCIUM SERPL-MCNC: 8.4 MG/DL — SIGNIFICANT CHANGE UP (ref 8.4–10.5)
CHLORIDE SERPL-SCNC: 107 MMOL/L — SIGNIFICANT CHANGE UP (ref 96–108)
CO2 SERPL-SCNC: 26 MMOL/L — SIGNIFICANT CHANGE UP (ref 22–31)
CREAT SERPL-MCNC: 1.51 MG/DL — HIGH (ref 0.5–1.3)
EOSINOPHIL # BLD AUTO: 0.2 K/UL — SIGNIFICANT CHANGE UP (ref 0–0.5)
EOSINOPHIL NFR BLD AUTO: 1.3 % — SIGNIFICANT CHANGE UP (ref 0–6)
GLUCOSE SERPL-MCNC: 59 MG/DL — LOW (ref 70–99)
HCT VFR BLD CALC: 27.9 % — LOW (ref 39–50)
HGB BLD-MCNC: 9.6 G/DL — LOW (ref 13–17)
LYMPHOCYTES # BLD AUTO: 1.4 K/UL — SIGNIFICANT CHANGE UP (ref 1–3.3)
LYMPHOCYTES # BLD AUTO: 10.8 % — LOW (ref 13–44)
MCHC RBC-ENTMCNC: 29.8 PG — SIGNIFICANT CHANGE UP (ref 27–34)
MCHC RBC-ENTMCNC: 34.4 GM/DL — SIGNIFICANT CHANGE UP (ref 32–36)
MCV RBC AUTO: 86.8 FL — SIGNIFICANT CHANGE UP (ref 80–100)
MONOCYTES # BLD AUTO: 1.2 K/UL — HIGH (ref 0–0.9)
MONOCYTES NFR BLD AUTO: 9.1 % — HIGH (ref 1–9)
NEUTROPHILS # BLD AUTO: 10.2 K/UL — HIGH (ref 1.8–7.4)
NEUTROPHILS NFR BLD AUTO: 78.3 % — HIGH (ref 43–77)
PLATELET # BLD AUTO: 348 K/UL — SIGNIFICANT CHANGE UP (ref 150–400)
POTASSIUM SERPL-MCNC: 3.7 MMOL/L — SIGNIFICANT CHANGE UP (ref 3.5–5.3)
POTASSIUM SERPL-SCNC: 3.7 MMOL/L — SIGNIFICANT CHANGE UP (ref 3.5–5.3)
PROT SERPL-MCNC: 7.1 G/DL — SIGNIFICANT CHANGE UP (ref 6–8.3)
RBC # BLD: 3.22 M/UL — LOW (ref 4.2–5.8)
RBC # FLD: 15.4 % — HIGH (ref 10.3–14.5)
SODIUM SERPL-SCNC: 140 MMOL/L — SIGNIFICANT CHANGE UP (ref 135–145)
WBC # BLD: 13 K/UL — HIGH (ref 3.8–10.5)
WBC # FLD AUTO: 13 K/UL — HIGH (ref 3.8–10.5)

## 2018-06-19 PROCEDURE — 82962 GLUCOSE BLOOD TEST: CPT

## 2018-06-19 PROCEDURE — 80053 COMPREHEN METABOLIC PANEL: CPT

## 2018-06-19 PROCEDURE — 85027 COMPLETE CBC AUTOMATED: CPT

## 2018-06-19 PROCEDURE — 99283 EMERGENCY DEPT VISIT LOW MDM: CPT

## 2018-06-19 PROCEDURE — 99284 EMERGENCY DEPT VISIT MOD MDM: CPT

## 2018-06-19 RX ORDER — SODIUM CHLORIDE 9 MG/ML
1000 INJECTION INTRAMUSCULAR; INTRAVENOUS; SUBCUTANEOUS ONCE
Qty: 0 | Refills: 0 | Status: COMPLETED | OUTPATIENT
Start: 2018-06-19 | End: 2018-06-19

## 2018-06-19 RX ADMIN — SODIUM CHLORIDE 1000 MILLILITER(S): 9 INJECTION INTRAMUSCULAR; INTRAVENOUS; SUBCUTANEOUS at 14:02

## 2018-06-19 RX ADMIN — SODIUM CHLORIDE 1000 MILLILITER(S): 9 INJECTION INTRAMUSCULAR; INTRAVENOUS; SUBCUTANEOUS at 10:30

## 2018-06-19 NOTE — ED PROVIDER NOTE - PSH
CHF with cardiomyopathy  Insertion of Cardiomems monitor  Diabetes with retinopathy  S/P PPV/PRP/GAS  OD 2/22/17 for viterous hemorrhage  H/O lithotripsy    History of eye surgery  left eye  Retinal Hemorrhage    S/P coronary artery stent placement  2010 [facility] to Diag ; 11/18/2010  LAD; 2/4/11 ATOM liberte Diag; 5/15/2017  [facility] to 1st diag; 6/7/17 PCI with laser and high pressure balloon  Stented coronary artery

## 2018-06-19 NOTE — ED ADULT NURSE NOTE - PMH
BPH (benign prostatic hyperplasia)    CAD (coronary artery disease)    Diabetes    Gout    HTN (hypertension)

## 2018-06-19 NOTE — ED PROVIDER NOTE - PMH
ASHD (arteriosclerotic heart disease)    BPH (benign prostatic hyperplasia)    CAD (coronary artery disease)    CKD (chronic kidney disease)    Diabetes    Diabetes Mellitus Type II, Uncontrolled    Diabetic retinopathy    Dyslipidemia    GERD (gastroesophageal reflux disease)    Gout    Gout    Hepatitis    HTN (hypertension)    HTN (hypertension)    Ischemic cardiomyopathy    Myocardial infarction    Nephrolithiasis    Pulmonary hypertension    s/p Angioplasty with Stent    Vitamin D deficiency

## 2018-06-19 NOTE — ED PROVIDER NOTE - MEDICAL DECISION MAKING DETAILS
58 yr old male with hx of DM, HLD, gout presents s/p hypoglycemic episode. Pt reports taking levemir insulin last night, 55 units prior to bedtime and then took humalog 12 units this am, and then did not eat breakfast, due to " getting busy at work". Pt was sitting and felt lightheaded and passed out. EMS was called, FS in 50s. Pt given juice. Now alert. : cbc, cmp, ivf, will give juice, and meal- will continue to monitor FS- and re eval

## 2018-06-19 NOTE — ED PROVIDER NOTE - OBJECTIVE STATEMENT
58 yr old male with hx of DM, HLD, gout presents s/p hypoglycemic episode. Pt reports taking levemir insulin last night, 55 units prior to bedtime and then took humalog 12 units this am, and then did not eat breakfast, due to " getting busy at work". Pt was sitting and felt lightheaded and passed out. EMS was called, FS in 50s. Pt given D50. Now alert. Denies any chest pain, sob, dizziness or any other symptoms at this time.

## 2018-06-19 NOTE — ED PROVIDER NOTE - PROGRESS NOTE DETAILS
Pt ate lunch in ED- FS has been above 50, observed for 4 hours- pt having no symptoms at this time. Pt stable for discharge and to follow up with pmd.

## 2018-06-19 NOTE — ED PROVIDER NOTE - ATTENDING CONTRIBUTION TO CARE
59yo M PMH: IDDM brought to ED by ems for hypoglycemia/near syncope, pt took insulin dose this am, did not eat and felt lightheaded, near syncope, given D50, now feeling better, prior similar episode though never this severe, no recent medication changes  On exam awake & alert, NAD., NC/AT, PERRL, mmm, lungs CTAB, RRR, abdomen soft NT/ND, no edema, no calf tenderness, 2+ pulses b/l, neuro A&Ox3, no focal deficits,skin warm and dry no rash

## 2018-06-22 ENCOUNTER — TRANSCRIPTION ENCOUNTER (OUTPATIENT)
Age: 58
End: 2018-06-22

## 2018-06-22 ENCOUNTER — RX RENEWAL (OUTPATIENT)
Age: 58
End: 2018-06-22

## 2018-07-06 ENCOUNTER — APPOINTMENT (OUTPATIENT)
Dept: NEUROLOGY | Facility: CLINIC | Age: 58
End: 2018-07-06
Payer: COMMERCIAL

## 2018-07-06 VITALS
HEIGHT: 60 IN | DIASTOLIC BLOOD PRESSURE: 71 MMHG | BODY MASS INDEX: 37.5 KG/M2 | SYSTOLIC BLOOD PRESSURE: 138 MMHG | HEART RATE: 92 BPM | WEIGHT: 191 LBS

## 2018-07-06 DIAGNOSIS — R26.89 OTHER ABNORMALITIES OF GAIT AND MOBILITY: ICD-10-CM

## 2018-07-06 PROCEDURE — 99205 OFFICE O/P NEW HI 60 MIN: CPT

## 2018-07-09 ENCOUNTER — OTHER (OUTPATIENT)
Age: 58
End: 2018-07-09

## 2018-07-19 PROBLEM — N40.0 BENIGN PROSTATIC HYPERPLASIA WITHOUT LOWER URINARY TRACT SYMPTOMS: Chronic | Status: ACTIVE | Noted: 2018-06-19

## 2018-07-19 PROBLEM — I25.10 ATHEROSCLEROTIC HEART DISEASE OF NATIVE CORONARY ARTERY WITHOUT ANGINA PECTORIS: Chronic | Status: ACTIVE | Noted: 2018-06-19

## 2018-07-19 PROBLEM — I21.3 ST ELEVATION (STEMI) MYOCARDIAL INFARCTION OF UNSPECIFIED SITE: Chronic | Status: ACTIVE | Noted: 2017-08-03

## 2018-07-25 ENCOUNTER — INPATIENT (INPATIENT)
Facility: HOSPITAL | Age: 58
LOS: 2 days | Discharge: HOME | End: 2018-07-28
Attending: HOSPITALIST | Admitting: HOSPITALIST
Payer: COMMERCIAL

## 2018-07-25 VITALS
OXYGEN SATURATION: 98 % | TEMPERATURE: 99 F | SYSTOLIC BLOOD PRESSURE: 149 MMHG | RESPIRATION RATE: 16 BRPM | HEART RATE: 94 BPM | DIASTOLIC BLOOD PRESSURE: 78 MMHG

## 2018-07-25 DIAGNOSIS — I50.9 HEART FAILURE, UNSPECIFIED: Chronic | ICD-10-CM

## 2018-07-25 DIAGNOSIS — L03.90 CELLULITIS, UNSPECIFIED: ICD-10-CM

## 2018-07-25 DIAGNOSIS — Z95.5 PRESENCE OF CORONARY ANGIOPLASTY IMPLANT AND GRAFT: Chronic | ICD-10-CM

## 2018-07-25 DIAGNOSIS — Z98.89 OTHER SPECIFIED POSTPROCEDURAL STATES: Chronic | ICD-10-CM

## 2018-07-25 DIAGNOSIS — E11.319 TYPE 2 DIABETES MELLITUS WITH UNSPECIFIED DIABETIC RETINOPATHY WITHOUT MACULAR EDEMA: Chronic | ICD-10-CM

## 2018-07-25 LAB
ALBUMIN SERPL ELPH-MCNC: 4.1 G/DL — SIGNIFICANT CHANGE UP (ref 3.3–5)
ALP SERPL-CCNC: 92 U/L — SIGNIFICANT CHANGE UP (ref 40–120)
ALT FLD-CCNC: 9 U/L — SIGNIFICANT CHANGE UP (ref 4–41)
APTT BLD: 33.4 SEC — SIGNIFICANT CHANGE UP (ref 27.5–37.4)
AST SERPL-CCNC: 12 U/L — SIGNIFICANT CHANGE UP (ref 4–40)
B-OH-BUTYR SERPL-SCNC: 0.2 MMOL/L — SIGNIFICANT CHANGE UP (ref 0–0.4)
BASE EXCESS BLDV CALC-SCNC: -0.8 MMOL/L — SIGNIFICANT CHANGE UP
BASE EXCESS BLDV CALC-SCNC: -0.9 MMOL/L — SIGNIFICANT CHANGE UP
BASOPHILS # BLD AUTO: 0.03 K/UL — SIGNIFICANT CHANGE UP (ref 0–0.2)
BASOPHILS NFR BLD AUTO: 0.2 % — SIGNIFICANT CHANGE UP (ref 0–2)
BILIRUB SERPL-MCNC: 0.4 MG/DL — SIGNIFICANT CHANGE UP (ref 0.2–1.2)
BLOOD GAS VENOUS - CREATININE: 1.8 MG/DL — HIGH (ref 0.5–1.3)
BLOOD GAS VENOUS - CREATININE: 1.91 MG/DL — HIGH (ref 0.5–1.3)
BUN SERPL-MCNC: 48 MG/DL — HIGH (ref 7–23)
CALCIUM SERPL-MCNC: 9.3 MG/DL — SIGNIFICANT CHANGE UP (ref 8.4–10.5)
CHLORIDE BLDV-SCNC: 103 MMOL/L — SIGNIFICANT CHANGE UP (ref 96–108)
CHLORIDE BLDV-SCNC: 105 MMOL/L — SIGNIFICANT CHANGE UP (ref 96–108)
CHLORIDE SERPL-SCNC: 99 MMOL/L — SIGNIFICANT CHANGE UP (ref 98–107)
CK MB BLD-MCNC: 1.31 NG/ML — SIGNIFICANT CHANGE UP (ref 1–6.6)
CK MB BLD-MCNC: SIGNIFICANT CHANGE UP (ref 0–2.5)
CK SERPL-CCNC: 109 U/L — SIGNIFICANT CHANGE UP (ref 30–200)
CO2 SERPL-SCNC: 23 MMOL/L — SIGNIFICANT CHANGE UP (ref 22–31)
CREAT SERPL-MCNC: 2 MG/DL — HIGH (ref 0.5–1.3)
EOSINOPHIL # BLD AUTO: 0.08 K/UL — SIGNIFICANT CHANGE UP (ref 0–0.5)
EOSINOPHIL NFR BLD AUTO: 0.7 % — SIGNIFICANT CHANGE UP (ref 0–6)
GAS PNL BLDV: 136 MMOL/L — SIGNIFICANT CHANGE UP (ref 136–146)
GAS PNL BLDV: 137 MMOL/L — SIGNIFICANT CHANGE UP (ref 136–146)
GLUCOSE BLDV-MCNC: 252 — HIGH (ref 70–99)
GLUCOSE BLDV-MCNC: 281 — HIGH (ref 70–99)
GLUCOSE SERPL-MCNC: 288 MG/DL — HIGH (ref 70–99)
HCO3 BLDV-SCNC: 23 MMOL/L — SIGNIFICANT CHANGE UP (ref 20–27)
HCO3 BLDV-SCNC: 24 MMOL/L — SIGNIFICANT CHANGE UP (ref 20–27)
HCT VFR BLD CALC: 32.9 % — LOW (ref 39–50)
HCT VFR BLDV CALC: 34.8 % — LOW (ref 39–51)
HCT VFR BLDV CALC: 36.6 % — LOW (ref 39–51)
HGB BLD-MCNC: 11.1 G/DL — LOW (ref 13–17)
HGB BLDV-MCNC: 11.3 G/DL — LOW (ref 13–17)
HGB BLDV-MCNC: 11.9 G/DL — LOW (ref 13–17)
IMM GRANULOCYTES # BLD AUTO: 0.05 # — SIGNIFICANT CHANGE UP
IMM GRANULOCYTES NFR BLD AUTO: 0.4 % — SIGNIFICANT CHANGE UP (ref 0–1.5)
INR BLD: 1 — SIGNIFICANT CHANGE UP (ref 0.88–1.17)
LACTATE BLDV-MCNC: 1 MMOL/L — SIGNIFICANT CHANGE UP (ref 0.5–2)
LACTATE BLDV-MCNC: 1.3 MMOL/L — SIGNIFICANT CHANGE UP (ref 0.5–2)
LYMPHOCYTES # BLD AUTO: 1.56 K/UL — SIGNIFICANT CHANGE UP (ref 1–3.3)
LYMPHOCYTES # BLD AUTO: 12.8 % — LOW (ref 13–44)
MCHC RBC-ENTMCNC: 29.5 PG — SIGNIFICANT CHANGE UP (ref 27–34)
MCHC RBC-ENTMCNC: 33.7 % — SIGNIFICANT CHANGE UP (ref 32–36)
MCV RBC AUTO: 87.5 FL — SIGNIFICANT CHANGE UP (ref 80–100)
MONOCYTES # BLD AUTO: 1.18 K/UL — HIGH (ref 0–0.9)
MONOCYTES NFR BLD AUTO: 9.7 % — SIGNIFICANT CHANGE UP (ref 2–14)
NEUTROPHILS # BLD AUTO: 9.28 K/UL — HIGH (ref 1.8–7.4)
NEUTROPHILS NFR BLD AUTO: 76.2 % — SIGNIFICANT CHANGE UP (ref 43–77)
NRBC # FLD: 0 — SIGNIFICANT CHANGE UP
PCO2 BLDV: 38 MMHG — LOW (ref 41–51)
PCO2 BLDV: 40 MMHG — LOW (ref 41–51)
PH BLDV: 7.39 PH — SIGNIFICANT CHANGE UP (ref 7.32–7.43)
PH BLDV: 7.41 PH — SIGNIFICANT CHANGE UP (ref 7.32–7.43)
PLATELET # BLD AUTO: 218 K/UL — SIGNIFICANT CHANGE UP (ref 150–400)
PMV BLD: 9.9 FL — SIGNIFICANT CHANGE UP (ref 7–13)
PO2 BLDV: 35 MMHG — SIGNIFICANT CHANGE UP (ref 35–40)
PO2 BLDV: 50 MMHG — HIGH (ref 35–40)
POTASSIUM BLDV-SCNC: 4 MMOL/L — SIGNIFICANT CHANGE UP (ref 3.4–4.5)
POTASSIUM BLDV-SCNC: 4.2 MMOL/L — SIGNIFICANT CHANGE UP (ref 3.4–4.5)
POTASSIUM SERPL-MCNC: 4.5 MMOL/L — SIGNIFICANT CHANGE UP (ref 3.5–5.3)
POTASSIUM SERPL-SCNC: 4.5 MMOL/L — SIGNIFICANT CHANGE UP (ref 3.5–5.3)
PROT SERPL-MCNC: 7.9 G/DL — SIGNIFICANT CHANGE UP (ref 6–8.3)
PROTHROM AB SERPL-ACNC: 11.1 SEC — SIGNIFICANT CHANGE UP (ref 9.8–13.1)
RBC # BLD: 3.76 M/UL — LOW (ref 4.2–5.8)
RBC # FLD: 14.7 % — HIGH (ref 10.3–14.5)
SAO2 % BLDV: 63.9 % — SIGNIFICANT CHANGE UP (ref 60–85)
SAO2 % BLDV: 85.4 % — HIGH (ref 60–85)
SODIUM SERPL-SCNC: 137 MMOL/L — SIGNIFICANT CHANGE UP (ref 135–145)
TROPONIN T, HIGH SENSITIVITY: 49 NG/L — SIGNIFICANT CHANGE UP (ref ?–14)
TROPONIN T, HIGH SENSITIVITY: 57 NG/L — CRITICAL HIGH (ref ?–14)
WBC # BLD: 12.18 K/UL — HIGH (ref 3.8–10.5)
WBC # FLD AUTO: 12.18 K/UL — HIGH (ref 3.8–10.5)

## 2018-07-25 PROCEDURE — 73630 X-RAY EXAM OF FOOT: CPT | Mod: 26,RT

## 2018-07-25 PROCEDURE — 71046 X-RAY EXAM CHEST 2 VIEWS: CPT | Mod: 26

## 2018-07-25 PROCEDURE — 73610 X-RAY EXAM OF ANKLE: CPT | Mod: 26,RT

## 2018-07-25 PROCEDURE — 99223 1ST HOSP IP/OBS HIGH 75: CPT | Mod: GC

## 2018-07-25 RX ORDER — INSULIN DETEMIR 100/ML (3)
55 INSULIN PEN (ML) SUBCUTANEOUS
Qty: 0 | Refills: 0 | COMMUNITY

## 2018-07-25 RX ORDER — INSULIN DETEMIR 100/ML (3)
60 INSULIN PEN (ML) SUBCUTANEOUS
Qty: 0 | Refills: 0 | COMMUNITY

## 2018-07-25 RX ORDER — HYDRALAZINE HCL 50 MG
0 TABLET ORAL
Qty: 0 | Refills: 0 | COMMUNITY

## 2018-07-25 RX ORDER — INSULIN LISPRO 100/ML
12 VIAL (ML) SUBCUTANEOUS
Qty: 0 | Refills: 0 | COMMUNITY

## 2018-07-25 RX ORDER — CARVEDILOL PHOSPHATE 80 MG/1
1 CAPSULE, EXTENDED RELEASE ORAL
Qty: 0 | Refills: 0 | COMMUNITY

## 2018-07-25 RX ORDER — METFORMIN HYDROCHLORIDE 850 MG/1
1 TABLET ORAL
Qty: 0 | Refills: 0 | COMMUNITY

## 2018-07-25 RX ORDER — MORPHINE SULFATE 50 MG/1
4 CAPSULE, EXTENDED RELEASE ORAL ONCE
Qty: 0 | Refills: 0 | Status: DISCONTINUED | OUTPATIENT
Start: 2018-07-25 | End: 2018-07-25

## 2018-07-25 RX ORDER — HYDRALAZINE HCL 50 MG
100 TABLET ORAL ONCE
Qty: 0 | Refills: 0 | Status: COMPLETED | OUTPATIENT
Start: 2018-07-25 | End: 2018-07-25

## 2018-07-25 RX ORDER — DIAZEPAM 5 MG
2 TABLET ORAL ONCE
Qty: 0 | Refills: 0 | Status: DISCONTINUED | OUTPATIENT
Start: 2018-07-25 | End: 2018-07-25

## 2018-07-25 RX ORDER — ISOSORBIDE DINITRATE 5 MG/1
1 TABLET ORAL
Qty: 0 | Refills: 0 | COMMUNITY

## 2018-07-25 RX ORDER — INSULIN GLARGINE 100 [IU]/ML
48 INJECTION, SOLUTION SUBCUTANEOUS AT BEDTIME
Qty: 0 | Refills: 0 | Status: DISCONTINUED | OUTPATIENT
Start: 2018-07-25 | End: 2018-07-26

## 2018-07-25 RX ORDER — CARVEDILOL PHOSPHATE 80 MG/1
12.5 CAPSULE, EXTENDED RELEASE ORAL EVERY 12 HOURS
Qty: 0 | Refills: 0 | Status: DISCONTINUED | OUTPATIENT
Start: 2018-07-25 | End: 2018-07-28

## 2018-07-25 RX ORDER — SODIUM CHLORIDE 9 MG/ML
2000 INJECTION INTRAMUSCULAR; INTRAVENOUS; SUBCUTANEOUS ONCE
Qty: 0 | Refills: 0 | Status: COMPLETED | OUTPATIENT
Start: 2018-07-25 | End: 2018-07-25

## 2018-07-25 RX ORDER — ACETAMINOPHEN 500 MG
1000 TABLET ORAL ONCE
Qty: 0 | Refills: 0 | Status: COMPLETED | OUTPATIENT
Start: 2018-07-25 | End: 2018-07-25

## 2018-07-25 RX ORDER — TAMSULOSIN HYDROCHLORIDE 0.4 MG/1
1 CAPSULE ORAL
Qty: 0 | Refills: 0 | COMMUNITY

## 2018-07-25 RX ORDER — ACETAMINOPHEN 500 MG
650 TABLET ORAL ONCE
Qty: 0 | Refills: 0 | Status: COMPLETED | OUTPATIENT
Start: 2018-07-25 | End: 2018-07-25

## 2018-07-25 RX ORDER — TICAGRELOR 90 MG/1
1 TABLET ORAL
Qty: 0 | Refills: 0 | COMMUNITY

## 2018-07-25 RX ORDER — ISOSORBIDE DINITRATE 5 MG/1
20 TABLET ORAL THREE TIMES A DAY
Qty: 0 | Refills: 0 | Status: DISCONTINUED | OUTPATIENT
Start: 2018-07-25 | End: 2018-07-28

## 2018-07-25 RX ORDER — INSULIN LISPRO 100/ML
15 VIAL (ML) SUBCUTANEOUS
Qty: 0 | Refills: 0 | COMMUNITY

## 2018-07-25 RX ORDER — METFORMIN HYDROCHLORIDE 850 MG/1
2 TABLET ORAL
Qty: 0 | Refills: 0 | COMMUNITY

## 2018-07-25 RX ADMIN — SODIUM CHLORIDE 2000 MILLILITER(S): 9 INJECTION INTRAMUSCULAR; INTRAVENOUS; SUBCUTANEOUS at 20:14

## 2018-07-25 RX ADMIN — Medication 100 MILLIGRAM(S): at 19:35

## 2018-07-25 RX ADMIN — MORPHINE SULFATE 4 MILLIGRAM(S): 50 CAPSULE, EXTENDED RELEASE ORAL at 19:35

## 2018-07-25 RX ADMIN — Medication 100 MILLIGRAM(S): at 23:49

## 2018-07-25 RX ADMIN — Medication 650 MILLIGRAM(S): at 17:16

## 2018-07-25 RX ADMIN — MORPHINE SULFATE 4 MILLIGRAM(S): 50 CAPSULE, EXTENDED RELEASE ORAL at 16:04

## 2018-07-25 RX ADMIN — Medication 400 MILLIGRAM(S): at 23:49

## 2018-07-25 RX ADMIN — SODIUM CHLORIDE 2000 MILLILITER(S): 9 INJECTION INTRAMUSCULAR; INTRAVENOUS; SUBCUTANEOUS at 16:31

## 2018-07-25 RX ADMIN — Medication 650 MILLIGRAM(S): at 19:35

## 2018-07-25 RX ADMIN — CARVEDILOL PHOSPHATE 12.5 MILLIGRAM(S): 80 CAPSULE, EXTENDED RELEASE ORAL at 23:49

## 2018-07-25 RX ADMIN — INSULIN GLARGINE 48 UNIT(S): 100 INJECTION, SOLUTION SUBCUTANEOUS at 23:55

## 2018-07-25 RX ADMIN — Medication 2 MILLIGRAM(S): at 16:04

## 2018-07-25 RX ADMIN — ISOSORBIDE DINITRATE 20 MILLIGRAM(S): 5 TABLET ORAL at 23:49

## 2018-07-25 RX ADMIN — Medication 600 MILLIGRAM(S): at 20:13

## 2018-07-25 NOTE — ED PROVIDER NOTE - OBJECTIVE STATEMENT
58M HTN, HLD, uncontrolled DM2 (with neuropathy, retinopathy, nephropathy), CAD (s/p 4 stents), CHF (with cardiomems), CKD stage 3, gout, 58M HTN, HLD, uncontrolled DM2 (with neuropathy, retinopathy, nephropathy), CAD (s/p 4 stents), CHF (with cardiomems), CKD stage 3, gout c/o right sided constant chest pain that radiates to posterior right neck, worsens with movement x 2 days. Also notes right foot/ankle pain x 2 days that feels similar to previous gout flare. States he has been experiencing GREENE but no SOB at rest as well at chills. Denies fever, recent illness, abdominal pain, N/V/D, cough, recent trauma.

## 2018-07-25 NOTE — CONSULT NOTE ADULT - SUBJECTIVE AND OBJECTIVE BOX
HPI:    Mr. Owens is a 59 yo man w/ hx of CAD s/p stents (LAD/D1 stents on 8712-2332; last two placed on 2017 at [facility]),  ICMO (mild-mod LV dysfunction, ~ 45% EF as per TTE 2017, has a cardiomems as well), DM, HTN, CKD (baseline ~ 1.5-2) who presented for chest pain.  Pt endorsing R shoulder/upper chest wall pain w/ radiation to the neck; describes it as an "ache" which has been ongoing for the past 2 days.  States that it worsens w/ movement, and gets relieved by lying still.  Also w/ dyspnea/orthopnea/PND as well as fatigue w/ abdominal distention which has been ongoing for the past few weeks.  As per wife/patient, he has these kind of sx's whenever he has "extra fluid in his body".      Pt states that he's been drinking a lot of water and hasn't been watching his fluid intake.  Checks his cardiomems daily but is not sure which number he's been getting.        PMH:   HTN (hypertension)  BPH (benign prostatic hyperplasia)  Gout  CAD (coronary artery disease)  Diabetes  Myocardial infarction  Pulmonary hypertension  ASHD (arteriosclerotic heart disease)  Ischemic cardiomyopathy  CKD (chronic kidney disease)  Hepatitis  Vitamin D deficiency  Nephrolithiasis  GERD (gastroesophageal reflux disease)  Diabetic retinopathy  Gout  HTN (hypertension)  s/p Angioplasty with Stent  Dyslipidemia  Diabetes Mellitus Type II, Uncontrolled      PSH:   Stented coronary artery  CHF with cardiomyopathy  Diabetes with retinopathy  H/O lithotripsy  History of eye surgery  S/P coronary artery stent placement  Retinal Hemorrhage      Medications:       Allergies:  No Known Allergies      FAMILY HISTORY:  Family history of cardiac disorder in father (Father)      Social History:  Smoking History:  Alcohol Use:  Drug Use:    Review of Systems:  REVIEW OF SYSTEMS:  CONSTITUTIONAL: No weakness, fevers or chills  EYES/ENT: No visual changes;  No dysphagia  NECK: No pain or stiffness  RESPIRATORY: +shortness of breath, PND, orthopnea  CARDIOVASCULAR: No chest pain or palpitations; No lower extremity edema  GASTROINTESTINAL: No abdominal or epigastric pain. No nausea, vomiting, or hematemesis; No diarrhea or constipation. No melena or hematochezia.  BACK: No back pain  GENITOURINARY: No dysuria, frequency or hematuria  NEUROLOGICAL: No numbness or weakness  SKIN: No itching, burning, rashes, or lesions   All other review of systems is negative unless indicated above.    Physical Exam:  T(F): 98.1 (-), Max: 102.1 (-25)  HR: 99 (-) (94 - 101)  BP: 149/69 (07-25) (149/69 - 183/74)  RR: 20 (-25)  SpO2: 96% (-)    GENERAL: No acute distress, well-developed  HEAD:  Atraumatic, Normocephalic  ENT: EOMI, PERRLA, conjunctiva and sclera clear, Neck supple, +JVD up to the upper mid neck bilaterally       CHEST/LUNG: Clear to auscultation bilaterally; No wheeze, equal breath sounds bilaterally  CHEST WALL: tenderness in the R upper chest wall w/ neck tenderness; similar to his chest pain      BACK: No spinal tenderness  HEART: Regular rate and rhythm; No murmurs, rubs, or gallops  ABDOMEN: Soft, distended abd, Nontender; Bowel sounds present  EXTREMITIES:  No clubbing, cyanosis, or edema  PSYCH: Nl behavior, nl affect  NEUROLOGY: AAOx3, non-focal, cranial nerves intact  SKIN: Normal color, No rashes or lesions  LINES:    Cardiovascular Diagnostic Testing:      ECG: sinus rhythm, normal axis, Q waves mary-septally, no new/acute changes     Echo:    < from: TTE with Doppler (w/Cont) (17 @ 14:10) >  1. Mitral annular calcification, otherwise normal mitral  valve. Minimal mitral regurgitation.  2. Normal left ventricular internal dimensions and wall  thicknesses.  3. Endocardium not well visualized; grossly mild to  moderate segmental left ventricular systolic dysfunction.  Hypokinesis of the apex and distalseptum.  Endocardial  visualization enhanced with intravenous injection of echo  contrast (Definity).  No LV thrombus seen.  4. The right ventricle is not well visualized; grossly  normal right ventricular systolic function.    < end of copied text >    Cath:    < from: Cardiac Cath Lab (11 @ 17:10) >  Left coronary angiography.  Right coronary angiography.  Left heart catheterization with ventriculography.  Intervention on D1: stent.  Technique: The risks and alternatives of the procedures and conscious  sedation were explained to the patient and informed consent was obtained.  Cardiac catheterization performed electively.  Right radial artery access. Left coronary artery angiography. The vessel  was injected utilizing a catheter. Right coronary artery angiography. The  vessel was injected utilizing a catheter. Left heart catheterization.  Ventriculography was performed. Contrast given: 70 ml Optiray. 70 ml  Optiray. Fluoroscopy time: 10.4 min.  A successful stent was performed on the 95 % lesion in the 1st diagonal.  Following intervention there was a 1 % residual stenosis. According to the  ACC/AHA classification system, this lesion was a type C lesion. There was  UTLIO 3 flow before the procedure and TULIO 3 flow after the procedure.  There was no dissection. Vessel setup was performed. A 6fr JL 3.5 Zuma  Guide guiding catheter was used to intubate the vessel. Vessel setup was  performed. A [employer] 190 Wire wire was used to cross the lesion. Balloon  angioplasty was performed, with a 2.0 X 12 [place] RX balloon, 1  inflations, and a maximum inflation pressure of 12 alyssa. A 2.25 X 16 Atom  Liberte drug-eluting stent was placed across the lesion and deployed with  one inflation at a maximum inflation pressure of 16 alyssa. A 2.25 X 12 Atom  Liberte drug-eluting stent was placed across the lesion and deployed with   one inflation at a maximum inflation pressure of 16 alyssa.  Medications given: Fentanyl, 25 mcg, IV. Midazolam, 1 mg, IV. Verapamil  (Isoptin, Calan, Covera), 2 mg, IA. Heparin, 2500 units, IA. Bivalirudin  (Angiomax), 11 ml, IV. Bivalirudin (Angiomax), infusion rate of mg/kg/hr,  IV. Lidocaine 1 percent, SC. Benadryl, 25 mg, IV.  Ventricles: EF estimated by contrast ventriculography was 60 %.  Coronary vessels: The coronary circulation is right dominant.  LM:      LM: Angiography showed minor luminal irregularities with no flow  limiting lesions.  LAD:      Mid LAD: There was a 10 % stenosis at the site of a prior stent.  D1: There was a 95 % stenosis.  CX:      Circumflex: Angiography showed minor luminal irregularities with  no flow limiting lesions.  RCA:      RCA: Angiography showed minor luminal irregularities with no flow  limiting lesions.  Complications: There were no complications.  Recommendations:  The patient should continue with the present medications.  Prepared and signed by  Jian Lopez M.D.  Signed 2011 07:47:12  Hemodynamic tables  Pressures:  Baseline  Pressures:  - HR: 82  Pressures:  - Rhythm:  Pressures:  -- Aortic Pressure (S/D/M): 108/56/77  Pressures:  -- Left Ventricle (s/edp): 130/19/--  Pressures:  Post Angio  Pressures:  - HR: 73  Pressures:  - Rhythm:  Pressures:  -- Aortic Pressure (S/D/M): 96/58/76  Pressures:  -- Left Ventricle (s/edp): 104/20/--  Outputs:  Baseline  Outputs:  -- CALCULATIONS[age] 50.88  Outputs:  -- CALCULATIONS: Body Surface Area: 1.87  Outputs:  -- CALCULATIONS: Height in cm: 168.00  Outputs:  -- CALCULATIONS: Sex: Male  Outputs:  -- CALCULATIONS: Weight in k.10  Outputs:  -- OUTPUTS: O2 consumption: 233.68  Outputs:  -- OUTPUTS: Vo2 Indexed: 125.00  Outputs:  Intervention  Outputs:  -- OUTPUTS: O2 consumption: 233.68  Outputs:  -- OUTPUTS: Vo2 Indexed: 125.00  Outputs:  Post Angio  Outputs:  -- OUTPUTS: O2 consumption: 233.68  Outputs:  -- OUTPUTS: Vo2 Indexed: 125.00    < end of copied text >        Imaging:    Labs: Personally reviewed                        11.18 )-----------( 218      ( 2018 15:20 )             32.9     07-25    137  |  99  |  48<H>  ----------------------------<  288<H>  4.5   |  23  |  2.00<H>    Ca    9.3      2018 15:20    TPro  7.9  /  Alb  4.1  /  TBili  0.4  /  DBili  x   /  AST  12  /  ALT  9   /  AlkPhos  92  07-25    PT/INR - ( 2018 15:20 )   PT: 11.1 SEC;   INR: 1.00          PTT - ( 2018 15:20 )  PTT:33.4 SEC    59 yo man w/ hx of CAD s/p stents (LAD/D1 stents on 1900-9571; last two placed on 2017 at [facility]),  ICMO (mild-mod LV dysfunction, ~ 45% EF as per TTE 2017, has a cardiomems as well), DM, HTN, CKD (baseline ~ 1.5-2) who presented for atypical chest pain.      Clinically in acute on chronic systolic heart failure; though has clear lungs does have evidence of elevated left sided filling pressures w/ total body hypervolemia (distended abd, JVD), this could explain the slight elevated in HS Trops in the setting of CKD.  THis is due to diet non-compliance    [facility] 2017 s/p TRICIA x 2; on asa and brillinta     RECS  - admit to telemetry  - diurese w/ IV lasix 40 mg BID; monitor I/Os, daily weights; net UOP 1.5-2L   - cont dapt (aspirin/ticagrelor), statin, coreg, isordil/hydralazine   - obtain TTE in the morning to re-evaluate LV function  - monitor labs; add CK, CK-mbs; trend HS trops      Once more euvolemic, recommend pursuing ischemic evaluation (pharmacological stress test)     LUCIA Eastman MD   [facility] [id number] HPI:    Mr. Owens is a 57 yo man w/ hx of CAD s/p stents (LAD/D1 stents on 4320-5023; last two placed on 2017 at [place]),  ICMO (mild-mod LV dysfunction, ~ 45% EF as per TTE 2017, has a cardiomems as well), DM, HTN, CKD (baseline ~ 1.5-2) who presented for chest pain.  Pt endorsing R shoulder/upper chest wall pain w/ radiation to the neck; describes it as an "ache" which has been ongoing for the past 2 days.  States that it worsens w/ movement, and gets relieved by lying still.  Also w/ dyspnea/orthopnea/PND as well as fatigue w/ abdominal distention which has been ongoing for the past few weeks.  As per wife/patient, he has these kind of sx's whenever he has "extra fluid in his body".      Pt states that he's been drinking a lot of water and hasn't been watching his fluid intake.  Checks his cardiomems daily but is not sure which number he's been getting.        PMH:   HTN (hypertension)  BPH (benign prostatic hyperplasia)  Gout  CAD (coronary artery disease)  Diabetes  Myocardial infarction  Pulmonary hypertension  ASHD (arteriosclerotic heart disease)  Ischemic cardiomyopathy  CKD (chronic kidney disease)  Hepatitis  Vitamin D deficiency  Nephrolithiasis  GERD (gastroesophageal reflux disease)  Diabetic retinopathy  Gout  HTN (hypertension)  s/p Angioplasty with Stent  Dyslipidemia  Diabetes Mellitus Type II, Uncontrolled      PSH:   Stented coronary artery  CHF with cardiomyopathy  Diabetes with retinopathy  H/O lithotripsy  History of eye surgery  S/P coronary artery stent placement  Retinal Hemorrhage      Medications:       Allergies:  No Known Allergies      FAMILY HISTORY:  Family history of cardiac disorder in father (Father)      Social History:  Smoking History:  Alcohol Use:  Drug Use:    Review of Systems:  REVIEW OF SYSTEMS:  CONSTITUTIONAL: No weakness, fevers or chills  EYES/ENT: No visual changes;  No dysphagia  NECK: No pain or stiffness  RESPIRATORY: +shortness of breath, PND, orthopnea  CARDIOVASCULAR: No chest pain or palpitations; No lower extremity edema  GASTROINTESTINAL: No abdominal or epigastric pain. No nausea, vomiting, or hematemesis; No diarrhea or constipation. No melena or hematochezia.  BACK: No back pain  GENITOURINARY: No dysuria, frequency or hematuria  NEUROLOGICAL: No numbness or weakness  SKIN: No itching, burning, rashes, or lesions   All other review of systems is negative unless indicated above.    Physical Exam:  T(F): 98.1 (-), Max: 102.1 (-25)  HR: 99 (-) (94 - 101)  BP: 149/69 (07-25) (149/69 - 183/74)  RR: 20 (-25)  SpO2: 96% (-)    GENERAL: No acute distress, well-developed  HEAD:  Atraumatic, Normocephalic  ENT: EOMI, PERRLA, conjunctiva and sclera clear, Neck supple, +JVD up to the upper mid neck bilaterally       CHEST/LUNG: Clear to auscultation bilaterally; No wheeze, equal breath sounds bilaterally  CHEST WALL: tenderness in the R upper chest wall w/ neck tenderness; similar to his chest pain      BACK: No spinal tenderness  HEART: Regular rate and rhythm; No murmurs, rubs, or gallops  ABDOMEN: Soft, distended abd, Nontender; Bowel sounds present  EXTREMITIES:  No clubbing, cyanosis, or edema  PSYCH: Nl behavior, nl affect  NEUROLOGY: AAOx3, non-focal, cranial nerves intact  SKIN: Normal color, No rashes or lesions  LINES:    Cardiovascular Diagnostic Testing:      ECG: sinus rhythm, normal axis, Q waves mary-septally, no new/acute changes     Echo:    < from: TTE with Doppler (w/Cont) (17 @ 14:10) >  1. Mitral annular calcification, otherwise normal mitral  valve. Minimal mitral regurgitation.  2. Normal left ventricular internal dimensions and wall  thicknesses.  3. Endocardium not well visualized; grossly mild to  moderate segmental left ventricular systolic dysfunction.  Hypokinesis of the apex and distalseptum.  Endocardial  visualization enhanced with intravenous injection of echo  contrast (Definity).  No LV thrombus seen.  4. The right ventricle is not well visualized; grossly  normal right ventricular systolic function.    < end of copied text >    Cath:    < from: Cardiac Cath Lab (11 @ 17:10) >  Left coronary angiography.  Right coronary angiography.  Left heart catheterization with ventriculography.  Intervention on D1: stent.  Technique: The risks and alternatives of the procedures and conscious  sedation were explained to the patient and informed consent was obtained.  Cardiac catheterization performed electively.  Right radial artery access. Left coronary artery angiography. The vessel  was injected utilizing a catheter. Right coronary artery angiography. The  vessel was injected utilizing a catheter. Left heart catheterization.  Ventriculography was performed. Contrast given: 70 ml Optiray. 70 ml  Optiray. Fluoroscopy time: 10.4 min.  A successful stent was performed on the 95 % lesion in the 1st diagonal.  Following intervention there was a 1 % residual stenosis. According to the  ACC/AHA classification system, this lesion was a type C lesion. There was  TULIO 3 flow before the procedure and TULIO 3 flow after the procedure.  There was no dissection. Vessel setup was performed. A 6fr JL 3.5 Zuma  Guide guiding catheter was used to intubate the vessel. Vessel setup was  performed. A [employer] 190 Wire wire was used to cross the lesion. Balloon  angioplasty was performed, with a 2.0 X 12 [place] RX balloon, 1  inflations, and a maximum inflation pressure of 12 alyssa. A 2.25 X 16 Atom  Liberte drug-eluting stent was placed across the lesion and deployed with  one inflation at a maximum inflation pressure of 16 alyssa. A 2.25 X 12 Atom  Liberte drug-eluting stent was placed across the lesion and deployed with   one inflation at a maximum inflation pressure of 16 alyssa.  Medications given: Fentanyl, 25 mcg, IV. Midazolam, 1 mg, IV. Verapamil  (Isoptin, Calan, Covera), 2 mg, IA. Heparin, 2500 units, IA. Bivalirudin  (Angiomax), 11 ml, IV. Bivalirudin (Angiomax), infusion rate of mg/kg/hr,  IV. Lidocaine 1 percent, SC. Benadryl, 25 mg, IV.  Ventricles: EF estimated by contrast ventriculography was 60 %.  Coronary vessels: The coronary circulation is right dominant.  LM:      LM: Angiography showed minor luminal irregularities with no flow  limiting lesions.  LAD:      Mid LAD: There was a 10 % stenosis at the site of a prior stent.  D1: There was a 95 % stenosis.  CX:      Circumflex: Angiography showed minor luminal irregularities with  no flow limiting lesions.  RCA:      RCA: Angiography showed minor luminal irregularities with no flow  limiting lesions.  Complications: There were no complications.  Recommendations:  The patient should continue with the present medications.  Prepared and signed by  Jian Lopez M.D.  Signed 2011 07:47:12  Hemodynamic tables  Pressures:  Baseline  Pressures:  - HR: 82  Pressures:  - Rhythm:  Pressures:  -- Aortic Pressure (S/D/M): 108/56/77  Pressures:  -- Left Ventricle (s/edp): 130/19/--  Pressures:  Post Angio  Pressures:  - HR: 73  Pressures:  - Rhythm:  Pressures:  -- Aortic Pressure (S/D/M): 96/58/76  Pressures:  -- Left Ventricle (s/edp): 104/20/--  Outputs:  Baseline  Outputs:  -- CALCULATIONS: [age] 50.88  Outputs:  -- CALCULATIONS: Body Surface Area: 1.87  Outputs:  -- CALCULATIONS: Height in cm: 168.00  Outputs:  -- CALCULATIONS: Sex: Male  Outputs:  -- CALCULATIONS: Weight in k.10  Outputs:  -- OUTPUTS: O2 consumption: 233.68  Outputs:  -- OUTPUTS: Vo2 Indexed: 125.00  Outputs:  Intervention  Outputs:  -- OUTPUTS: O2 consumption: 233.68  Outputs:  -- OUTPUTS: Vo2 Indexed: 125.00  Outputs:  Post Angio  Outputs:  -- OUTPUTS: O2 consumption: 233.68  Outputs:  -- OUTPUTS: Vo2 Indexed: 125.00    < end of copied text >        Imaging:    Labs: Personally reviewed                        11.18 )-----------( 218      ( 2018 15:20 )             32.9     07-25    137  |  99  |  48<H>  ----------------------------<  288<H>  4.5   |  23  |  2.00<H>    Ca    9.3      2018 15:20    TPro  7.9  /  Alb  4.1  /  TBili  0.4  /  DBili  x   /  AST  12  /  ALT  9   /  AlkPhos  92  07-25    PT/INR - ( 2018 15:20 )   PT: 11.1 SEC;   INR: 1.00          PTT - ( 2018 15:20 )  PTT:33.4 SEC    Assessment     57 yo man w/ hx of CAD s/p stents (LAD/D1 stents on 7654-7468; last two placed on 2017 at [place]),  ICMO (mild-mod LV dysfunction, ~ 45% EF as per TTE 2017, has a cardiomems as well), DM, HTN, CKD (baseline ~ 1.5-2) who presented for atypical chest pain.      Clinically in acute on chronic systolic heart failure; though has clear lungs does have evidence of elevated left sided filling pressures w/ total body hypervolemia (distended abd, JVD), this could explain the slight elevated in HS Trops in the setting of CKD.  THis is due to diet non-compliance    [facility] 2017 s/p TRICIA x 2; on asa and brillinta     RECS  - admit to telemetry  - diurese w/ IV lasix 40 mg BID; monitor I/Os, daily weights; net UOP 1.5-2L   - cont dapt (aspirin/ticagrelor), statin, coreg, isordil/hydralazine   - obtain TTE in the morning to re-evaluate LV function  - monitor labs; add CK, CK-mbs; trend HS trops      If volume status does not improve w/ IV diuretic therapy, may be reasonable to re-interrogate cardiomems and notify HF service.  Hold off for now     Once more euvolemic, recommend pursuing ischemic evaluation (pharmacological stress test)     LUCIA Eastman MD   [facility] [id number]

## 2018-07-25 NOTE — ED PROVIDER NOTE - CHPI ED SYMPTOMS NEG
no vomiting/no fever/no cough/no shortness of breath/no nausea/no syncope/no diaphoresis/no dizziness

## 2018-07-25 NOTE — H&P ADULT - PROBLEM SELECTOR PLAN 1
Patient with fever, tachycardia, and leukocytosis on CBC. Likely source pulmonary given cough and pleuritic chest pain; CXR showing vascular congestion but no overt consolidation Less likely urinary given lack of sx.  - f/u RVP   - CT chest  - f/u blood ctx  - f/u leukocytosis  - given decompensated heart failure, hold fluid Patient with fever, tachycardia, and leukocytosis on CBC. Likely source pulmonary given cough and pleuritic chest pain; CXR showing vascular congestion but no overt consolidation Less likely urinary given lack of sx.   - s/p 2L IVF  - f/u RVP   - CT chest  - Zosyn x 1 and Azithromycin x 1  - f/u blood ctx  - f/u leukocytosis  - given decompensated heart failure, hold additional fluids  - tylenol prn for fevers

## 2018-07-25 NOTE — ED PROVIDER NOTE - MEDICAL DECISION MAKING DETAILS
59 y/o M with chest pain, foot/ankle pain/swelling, and fever/rigors  -cbc, cmp, trop, cxr, foot/ankle xray, abx, fluids, tylenol

## 2018-07-25 NOTE — CONSULT NOTE ADULT - ATTENDING COMMENTS
Patient endorses R sided chest pain, neck pain worsened with movement  Doubt ACS  Noted CT chest with no pulmonary edema, on exam other than distended abdomen no clear evidence of peripheral edema  Doubt fluid overload, in adddition pt states he checks in daily for MEMS and was not told his numbers were elevated recently  By policy, MEMS that was not implanted at [facility], cannot be interrogated here. In any case, exam and findings not consistent with fluid overload  Would dc iv lasix as it will probably exacerbated the gout flare he his having  Continue other cardiac meds  EKG wnl, not consistent with pericarditis

## 2018-07-25 NOTE — H&P ADULT - PROBLEM SELECTOR PLAN 6
Last HbA1c 8.1. On Lantus 60 mg at bedtime and Humalog 12 units premeal.   - c/w lantus 50 units at bedtime  - c/w humalog 10 units premeal  - c/w FSG and ISS qAC and qhs  - DASH TLC consistent carb diet

## 2018-07-25 NOTE — H&P ADULT - PROBLEM SELECTOR PLAN 4
Baseline CKD stage 3, with Cr 1.7. Currently mildly elevated in setting of sepsis and acute on chronic heart failure  - c/w to monitor  - address heart failure as above  - address sepsis as above  - monitor BMP, electrolytes  - strict I/Os Baseline CKD stage 3, with Cr 1.7. Currently mildly elevated in setting of sepsis and acute on chronic heart failure. No electrolyte anomalies.   - c/w to monitor  - address heart failure as above  - address sepsis as above  - monitor BMP, electrolytes  - strict I/Os

## 2018-07-25 NOTE — H&P ADULT - NSHPSOCIALHISTORY_GEN_ALL_CORE
Never smoker  No alcohol use  No illicit drugs  Lives with his wife and children at home, uses cane for ambulation  Works as an

## 2018-07-25 NOTE — ED PROVIDER NOTE - LOWER EXTREMITY EXAM, RIGHT
TENDERNESS/+ttp to generalized plantar bony prominences and lateral malleolus, + swelling and warmth to foot/ankle, no significant erythema, 2+ DP, no calf ttp/SWELLING

## 2018-07-25 NOTE — H&P ADULT - PROBLEM SELECTOR PLAN 2
Patient with baseline HFrEF with EF of 45% as of 05/2018 with cardiomems in place in setting of CAD. Currently with heart failure exacerbation likely triggered by sepsis. EKG without ST changes and troponins elevated but 2nd value downtrending, low suspicion for ischemic event  - c/w carvedilol 12.5 mg BID  - c/w hydralazine 75 mg TID  - c/w isosorbide dinitrate 20 mg TID  - evaluated by cardiology, c/w lasix 40 mg BID  - strict I/O  - daily weights  - monitor electrolytes BID Patient with baseline HFrEF with EF of 45% as of 05/2018 with cardiomems in place in setting of CAD. Currently with heart failure exacerbation likely triggered by sepsis. EKG without ST changes and troponins elevated but 2nd value downtrending, low suspicion for ischemic event. S/p 2L fluids in ED  - lasix 40 mg x 1  - c/w carvedilol 12.5 mg BID  - c/w hydralazine 75 mg TID  - c/w isosorbide dinitrate 20 mg TID  - evaluated by cardiology, c/w lasix 40 mg BID  - strict I/O  - daily weights  - monitor electrolytes BID Patient with baseline HFrEF with EF of 45% as of 05/2018 with cardiomems in place in setting of CAD. Currently with heart failure exacerbation likely triggered by sepsis. EKG without ST changes and troponins elevated but 2nd value downtrending, low suspicion for ischemic event. S/p 2L fluids in ED  - currently saturating well on RA, no sx of respiratory distress  - lasix 40 mg x 1  - c/w carvedilol 12.5 mg BID  - c/w hydralazine 75 mg TID  - c/w isosorbide dinitrate 20 mg TID  - evaluated by cardiology, c/w lasix 40 mg BID  - strict I/O  - daily weights  - monitor electrolytes BID

## 2018-07-25 NOTE — ED ADULT TRIAGE NOTE - CHIEF COMPLAINT QUOTE
c/o chest pain with radiation to neck and left arm x 2 days, c/o right foot planter pain x 2 days.  Ambulating with cane. PMH: HTN, BPH, gout, CAD, DM, MI, pulmonary HTN, CKD, hepatitis, cardiomyopathy

## 2018-07-25 NOTE — H&P ADULT - ASSESSMENT
58 M with PMH CAD s/p stents (LAD/D1 6697-5322; 2 stent 6/2017 at [place]) on ASA/brillinta, HFrEF with EF 45% EF (05/2017, s/p cardiomems), T2DM on insulin c/b retinopathy and neuropathy, HTN, CKD stage 3 (baseline ~ 1.5-2), and gait ataxia unclear etiology who presented for pleuritic chest pain, concerning for sepsis 2/2 pneumonia c/b acute on chronic heart failure. 58 M with PMH CAD s/p stents (LAD/D1 [id number]; 2 stent 6/2017 at [place]) on ASA/brillinta, HFrEF with EF 45% EF (05/2017, s/p cardiomems), T2DM on insulin c/b retinopathy and neuropathy, HTN, CKD stage 3 (baseline ~ 1.5-2), and gait ataxia unclear etiology who presented for pleuritic chest pain x 2, concerning for sepsis 2/2 pneumonia c/b acute on chronic heart failure.

## 2018-07-25 NOTE — ED PROVIDER NOTE - ATTENDING CONTRIBUTION TO CARE
NAREN BURR  ATTENDING, MD: 57 yo M Hypertension, hyperlipidemia, DM2 (with neuropathy, retinopathy, nephropathy), CAD s/p 4 stents, CHF (with cardiomems), CKD, gout presents with RIGHT sided chest pain which radiates to posterior RIGHT side of the neck, constant and worse with movement for 2 days.  GREENE without SOB at rest.  Chills but no fever.  Patient states he also has RIGHT foot and ankle pain for past 2 days similar to prior gout flare.     PHYSICAL EXAM:    GENERAL: Patient is awake and alert and in no acute distress.  Non-toxic appearing.  A+Ox4  HEAD:  Airway patent.  No oropharyngeal edema.  No stridor.  Auricles are normal.    EYES: EOM grossly intact, PERRLA, conjunctiva non-injected and sclera clear  NECK: Supple, No vertebral point tenderness to palpation.  CHEST/LUNG: Lungs clear to auscultation bilaterally; no wheeze, no rhonchi,  no rales.    HEART: Regular rate and rhythm;   ABDOMEN: Soft, non-tender to palpation.  No rebound/no guarding.  Bowel sounds present x 4.   MSK/EXTREMITIES: No clubbing, cyanosis, or edema. Back is nontender, with no vertebral point tenderness to palpation, no CVAT.  Moving all 4 extremities.   LEFT foot and ankle tenderness to palpation without significant erythema.  NEURO: Neurologically grossly intact.  CN II-XII intact.  5/5 strength x 4 extremities.  Ambulatory without ataxia. No obvious deficits.   PSYCH: Psychiatrically normal mood and affect.  No apparent risk to self or others.       DR. BURR, ATTENDING MD:    I performed a face to face bedside interview with patient regarding history of present illness, review of symptoms and past medical history. I completed an independent physical exam.  I have discussed patient's plan of care with the team of health care providers.   I agree with note as stated above, having amended the EMR as needed to reflect my findings. I have discussed the assessment and plan of care.  This includes during the time I functioned as the attending physician for this patient.

## 2018-07-25 NOTE — H&P ADULT - PROBLEM SELECTOR PLAN 5
Hx of CAD s/p stents (LAD/D1 [id number]; 2 stent 6/2017 at [place]) on ASA/brillinta.  - continue DAPT  - c/w atorvastatin  - c/w carvedilol

## 2018-07-25 NOTE — H&P ADULT - HISTORY OF PRESENT ILLNESS
58 M with PMH CAD s/p stents (LAD/D1 [id number]; 2 stent 6/2017 at [place]) on ASA/brillinta, HFrEF with EF 45% EF (05/2017, s/p cardiomems), T2DM on insulin c/b retinopathy and neuropathy, HTN, CKD stage 3 (baseline ~ 1.5-2), and gait ataxia unclear etiology who presented for chest pain  for past 2 days. Per patient, he reports sharp right sided chest pain radiating to the neck worse with inspiration and movement. He also endorses intermittent productive cough for the past 2 weeks, but feels he is unable to expectorate. He denies any sick contacts or recent travel. He started experiencing chills at home as well, but no recorded fever. Despite taking all his heart failure medications, he has noted increased abdominal and lower extremity swelling. In the right foot, he has pain over the heel which is worsened by weight bearing; but no trauma, injury. He stated that his neuropathic pain in his feet are usually on the dorsal aspect and his last gout flare was in both ankles. He otherwise denies headache, abdominal pain, dysuria, diarrhea/constipation. Of note also, patient is following neurology as outpatient regarding gait ataxia (ie bumping into wall?) but no favoring of side that has been on going for the past year, being followed by neurology and pending outpatient MRI on Sunday 7/29 for further workup.     In ED T 102,  (sinus) /74. EKG no STT changes, troponin elevate but downtrended. CBC with leukocytosis, Hb 11 (baseline 9.8s). Cr 2.0 (baseline 1.6s). Patient given 2L IVF, clindamycin x 1, tylenol x 1, morphine 4 mg x 1, and diazepam 2 mg x 1 58 M with PMH CAD s/p stents (LAD/D1 [id number]; 2 stent 6/2017 at [place]) on ASA/brillinta, HFrEF with EF 45% EF (05/2017, s/p cardiomems), T2DM on insulin c/b retinopathy and neuropathy, HTN, CKD stage 3 (baseline ~ 1.5-2), and gait ataxia unclear etiology who presented for chest pain  for past 2 days. Per patient, he reports sharp right sided chest pain radiating to the neck worse with inspiration and movement. He also endorses intermittent productive cough for the past 2 weeks, but feels he is unable to expectorate. He denies any sick contacts or recent travel. He started experiencing chills at home as well, but no recorded fever. Despite taking all his heart failure medications, he has noted increased abdominal and lower extremity swelling. In the right foot, he has pain over the heel which is worsened by weight bearing; but no trauma, injury or skin changes. He stated that his neuropathic pain in his feet are usually on the dorsal aspect and his last gout flare was in both ankles. He otherwise denies headache, abdominal pain, dysuria, diarrhea/constipation. Of note also, patient is following neurology as outpatient regarding gait ataxia (ie bumping into wall?) but no favoring of side that has been on going for the past year, being followed by neurology and pending outpatient MRI on Sunday 7/29 for further workup.     In ED T 102,  (sinus) /74. EKG no STT changes, troponin elevate but downtrended. CBC with leukocytosis, Hb 11 (baseline 9.8s). Cr 2.0 (baseline 1.6s). Patient given 2L IVF, clindamycin x 1, tylenol x 1, morphine 4 mg x 1, and diazepam 2 mg x 1

## 2018-07-25 NOTE — ED PROVIDER NOTE - CONSTITUTIONAL, MLM
normal... Ill appearing, +rigors, well nourished, awake, alert, oriented to person, place, time/situation and in mild distress.

## 2018-07-25 NOTE — ED ADULT NURSE NOTE - OBJECTIVE STATEMENT
A&Ox3, respiration even, ambulatory with cane which is at bedside, right foot warm and mildly swollen, actively shivering patient reports feels cold, blankets provided, speaks in full sentences. Patient reports right sided chest pain radiating toward back of neck A&Ox3, respiration even, ambulatory with cane which is at bedside, right foot warm and mildly swollen, actively shivering patient reports feels cold, speaks in full sentences. Patient reports right sided chest pain radiating toward back of neck and right foot pain x 2 days. Hx of gout, CAD, 4 stents, 1 MI. Left AC 20g IV placed, labs drawn and sent. Wife at bedside.

## 2018-07-25 NOTE — H&P ADULT - NSHPPHYSICALEXAM_GEN_ALL_CORE
PHYSICAL EXAM:    Vital Signs Last 24 Hrs  T(C): 38.3 (25 Jul 2018 22:49), Max: 38.9 (25 Jul 2018 15:34)  T(F): 101 (25 Jul 2018 22:49), Max: 102.1 (25 Jul 2018 15:34)  HR: 97 (25 Jul 2018 22:49) (94 - 101)  BP: 203/90 (25 Jul 2018 22:49) (149/69 - 203/90)  BP(mean): --  RR: 16 (25 Jul 2018 22:49) (16 - 21)  SpO2: 98% (25 Jul 2018 22:49) (96% - 98%)    General: Middle age male resting in bed on hallway in mild discomfort  HEENT: NCAT.  PERRL.  EOMI.  No scleral icterus or injection.  Moist MM.  No oropharyngeal exudates.    Neck: Supple.  Full ROM.  + JVD.  No thyromegaly. No lymphadenopathy.   Heart: RRR.  Normal S1 and S2.  No murmurs   Lungs: Coarse breath sound bilaterally. No wheezes, crackles, or rhonchi.    Abdomen: Distended, tense, no guarding or rigidity. BS+  Skin: Warm and dry.  No rashes.  Extremities: + pitting edema in b/l extremities. Tenderness on right plantar aspect over heel.  2+ peripheral pulses b/l.  Musculoskeletal: No deformities.  No spinal or paraspinal tenderness.  Neuro: A&Ox3.  CN II-XII intact.  5/5 strength in UE and LE b/l.  Tactile sensation intact in UE and LE b/l.  Cerebellar function intact PHYSICAL EXAM:    Vital Signs Last 24 Hrs  T(C): 38.3 (25 Jul 2018 22:49), Max: 38.9 (25 Jul 2018 15:34)  T(F): 101 (25 Jul 2018 22:49), Max: 102.1 (25 Jul 2018 15:34)  HR: 97 (25 Jul 2018 22:49) (94 - 101)  BP: 203/90 (25 Jul 2018 22:49) (149/69 - 203/90)  BP(mean): --  RR: 16 (25 Jul 2018 22:49) (16 - 21)  SpO2: 98% (25 Jul 2018 22:49) (96% - 98%)    General: Middle age male resting in bed on hallway in mild discomfort  HEENT: NCAT.  PERRL.  EOMI.  No scleral icterus or injection.  Moist MM.  No oropharyngeal exudates.    Neck: Supple.  Full ROM.  + JVD.  No thyromegaly. No lymphadenopathy.   Heart: Regular rh  Normal S1 and S2.  No murmurs   Lungs: Coarse breath sound bilaterally. No wheezes, crackles, or rhonchi.    Abdomen: Distended, tense, no guarding or rigidity. BS+  Skin: Warm and dry.  No rashes.  Extremities: 1+ pitting edema in b/l extremities. Tenderness on right plantar aspect over heel.  No ulcers or trauma heel. 2+ peripheral pulses b/l.  Musculoskeletal: No deformities.  No spinal or paraspinal tenderness.  Neuro: A&Ox3.  CN II-XII intact.  5/5 strength in UE and LE b/l.  Tactile sensation intact in UE and LE b/l.  Cerebellar function intact Vital Signs Last 24 Hrs  T(C): 38.3 (25 Jul 2018 22:49), Max: 38.9 (25 Jul 2018 15:34)  T(F): 101 (25 Jul 2018 22:49), Max: 102.1 (25 Jul 2018 15:34)  HR: 97 (25 Jul 2018 22:49) (94 - 101)  BP: 203/90 (25 Jul 2018 22:49) (149/69 - 203/90)  BP(mean): --  RR: 16 (25 Jul 2018 22:49) (16 - 21)  SpO2: 98% (25 Jul 2018 22:49) (96% - 98%)    General: Middle age male resting in bed on hallway in mild discomfort  HEENT: PERRL.  EOMI.  No scleral icterus or injection.  Moist MM.  No oropharyngeal exudates.    Neck: Supple.  Full ROM.  + JVD.  No thyromegaly. No lymphadenopathy.   Heart: Regular rh  Normal S1 and S2.  No murmurs   Lungs: Coarse breath sound bilaterally. No wheezes, crackles, or rhonchi.    Abdomen: Distended, tense, no guarding or rigidity. BS+  Skin: Warm and dry.  No rashes.  Extremities: 1+ pitting edema in b/l extremities. Tenderness on right plantar aspect over heel.  No ulcers or trauma heel. 2+ peripheral pulses b/l.  Musculoskeletal: No deformities.  No spinal or paraspinal tenderness.  Neuro: A&Ox3.  CN II-XII intact.  5/5 strength in UE and LE b/l.  Tactile sensation intact in UE and LE b/l.  Cerebellar function intact

## 2018-07-25 NOTE — H&P ADULT - NSHPLABSRESULTS_GEN_ALL_CORE
CBC Full  -  ( 25 Jul 2018 15:20 )  WBC Count : 12.18 K/uL  Hemoglobin : 11.1 g/dL  Hematocrit : 32.9 %  Platelet Count - Automated : 218 K/uL  Mean Cell Volume : 87.5 fL  Mean Cell Hemoglobin : 29.5 pg  Mean Cell Hemoglobin Concentration : 33.7 %  Auto Neutrophil # : 9.28 K/uL  Auto Lymphocyte # : 1.56 K/uL  Auto Monocyte # : 1.18 K/uL  Auto Eosinophil # : 0.08 K/uL  Auto Basophil # : 0.03 K/uL  Auto Neutrophil % : 76.2 %  Auto Lymphocyte % : 12.8 %  Auto Monocyte % : 9.7 %  Auto Eosinophil % : 0.7 %  Auto Basophil % : 0.2 %    07-25    137  |  99  |  48<H>  ----------------------------<  288<H>  4.5   |  23  |  2.00<H>    Ca    9.3      25 Jul 2018 15:20    TPro  7.9  /  Alb  4.1  /  TBili  0.4  /  DBili  x   /  AST  12  /  ALT  9   /  AlkPhos  92  07-25    LIVER FUNCTIONS - ( 25 Jul 2018 15:20 )  Alb: 4.1 g/dL / Pro: 7.9 g/dL / ALK PHOS: 92 u/L / ALT: 9 u/L / AST: 12 u/L / GGT: x             PT/INR - ( 25 Jul 2018 15:20 )   PT: 11.1 SEC;   INR: 1.00          PTT - ( 25 Jul 2018 15:20 )  PTT:33.4 SEC  50  35    Creatine Kinase, Serum: 109 u/L (07-25-18 @ 17:10)  CKMB: 1.31 ng/mL (07-25-18 @ 17:10)  CKMB Relative Index: Test not performed (07-25-18 @ 17:10)        EKG:   NSR rate 101 with 1 mm ST elevation in V1 and V2 Qtc 420    Imaging: CBC Full  -  ( 25 Jul 2018 15:20 )  WBC Count : 12.18 K/uL  Hemoglobin : 11.1 g/dL  Hematocrit : 32.9 %  Platelet Count - Automated : 218 K/uL  Mean Cell Volume : 87.5 fL  Mean Cell Hemoglobin : 29.5 pg  Mean Cell Hemoglobin Concentration : 33.7 %  Auto Neutrophil # : 9.28 K/uL  Auto Lymphocyte # : 1.56 K/uL  Auto Monocyte # : 1.18 K/uL  Auto Eosinophil # : 0.08 K/uL  Auto Basophil # : 0.03 K/uL  Auto Neutrophil % : 76.2 %  Auto Lymphocyte % : 12.8 %  Auto Monocyte % : 9.7 %  Auto Eosinophil % : 0.7 %  Auto Basophil % : 0.2 %    07-25    137  |  99  |  48<H>  ----------------------------<  288<H>  4.5   |  23  |  2.00<H>    Ca    9.3      25 Jul 2018 15:20    TPro  7.9  /  Alb  4.1  /  TBili  0.4  /  DBili  x   /  AST  12  /  ALT  9   /  AlkPhos  92  07-25    LIVER FUNCTIONS - ( 25 Jul 2018 15:20 )  Alb: 4.1 g/dL / Pro: 7.9 g/dL / ALK PHOS: 92 u/L / ALT: 9 u/L / AST: 12 u/L / GGT: x             PT/INR - ( 25 Jul 2018 15:20 )   PT: 11.1 SEC;   INR: 1.00          PTT - ( 25 Jul 2018 15:20 )  PTT:33.4 SEC  50  35    Creatine Kinase, Serum: 109 u/L (07-25-18 @ 17:10)  CKMB: 1.31 ng/mL (07-25-18 @ 17:10)  CKMB Relative Index: Test not performed (07-25-18 @ 17:10)    Troponin T, High Sensitivity (07.25.18 @ 17:10)    Troponin T, High Sensitivity: 49    Troponin T, High Sensitivity: 57      EKG:   NSR rate 101 with 1 mm ST elevation in V1 and V2 Qtc 420    Imaging: CBC Full  -  ( 25 Jul 2018 15:20 )  WBC Count : 12.18 K/uL  Hemoglobin : 11.1 g/dL  Hematocrit : 32.9 %  Platelet Count - Automated : 218 K/uL  Mean Cell Volume : 87.5 fL  Mean Cell Hemoglobin : 29.5 pg  Mean Cell Hemoglobin Concentration : 33.7 %  Auto Neutrophil # : 9.28 K/uL  Auto Lymphocyte # : 1.56 K/uL  Auto Monocyte # : 1.18 K/uL  Auto Eosinophil # : 0.08 K/uL  Auto Basophil # : 0.03 K/uL  Auto Neutrophil % : 76.2 %  Auto Lymphocyte % : 12.8 %  Auto Monocyte % : 9.7 %  Auto Eosinophil % : 0.7 %  Auto Basophil % : 0.2 %    07-25    137  |  99  |  48<H>  ----------------------------<  288<H>  4.5   |  23  |  2.00<H>    Ca    9.3      25 Jul 2018 15:20    TPro  7.9  /  Alb  4.1  /  TBili  0.4  /  DBili  x   /  AST  12  /  ALT  9   /  AlkPhos  92  07-25    LIVER FUNCTIONS - ( 25 Jul 2018 15:20 )  Alb: 4.1 g/dL / Pro: 7.9 g/dL / ALK PHOS: 92 u/L / ALT: 9 u/L / AST: 12 u/L / GGT: x             PT/INR - ( 25 Jul 2018 15:20 )   PT: 11.1 SEC;   INR: 1.00          PTT - ( 25 Jul 2018 15:20 )  PTT:33.4 SEC  50  35    Creatine Kinase, Serum: 109 u/L (07-25-18 @ 17:10)  CKMB: 1.31 ng/mL (07-25-18 @ 17:10)  CKMB Relative Index: Test not performed (07-25-18 @ 17:10)    Troponin T, High Sensitivity (07.25.18 @ 17:10)    Troponin T, High Sensitivity: 49    Troponin T, High Sensitivity: 57      EKG personally reviewed, NSR rate 101 with 1 mm ST elevation in V1 and V2 Qtc 420

## 2018-07-25 NOTE — H&P ADULT - PROBLEM SELECTOR PLAN 3
Patient with recurrent flares, last flare in bilateral ankles in June 2018. Possible precipitated by need for daily lasix. This may explain pain over the heel, but XR foot wnl  - c/w allopurinol Patient with recurrent flares, last flare in bilateral ankles in June 2018. Possible precipitated by need for daily lasix. This may explain pain over the heel, but XR foot wnl  - c/w allopurinol  - tylenol prn for pain Patient with recurrent flares, last flare in bilateral ankles in June 2018. Currently with severe pain in midfoot plantar aspect. Possible precipitated by need for daily lasix. This may explain pain over the heel, but XR foot wnl  - c/w allopurinol  - start colchicine for tx of acute gout  - tylenol prn for pain

## 2018-07-25 NOTE — H&P ADULT - PMH
ASHD (arteriosclerotic heart disease)    BPH (benign prostatic hyperplasia)    CAD (coronary artery disease)    CKD (chronic kidney disease)    Diabetes Mellitus Type II, Uncontrolled    Diabetic retinopathy    Dyslipidemia    GERD (gastroesophageal reflux disease)    Gout    Hepatitis    HTN (hypertension)    Ischemic cardiomyopathy    Myocardial infarction    Nephrolithiasis    Pulmonary hypertension    s/p Angioplasty with Stent    Vitamin D deficiency

## 2018-07-25 NOTE — ED PROVIDER NOTE - CARE PLAN
Principal Discharge DX:	Cellulitis  Secondary Diagnosis:	Sepsis  Secondary Diagnosis:	Troponin level elevated

## 2018-07-25 NOTE — H&P ADULT - NSHPREVIEWOFSYSTEMS_GEN_ALL_CORE
REVIEW OF SYSTEMS:    CONSTITUTIONAL: + chills  EYES/ENT: No visual changes;  No vertigo or throat pain   NECK: No pain or stiffness  RESPIRATORY: + cough, no shortness of breath, no orthopnea   CARDIOVASCULAR: + chest pain, no palpitations  GASTROINTESTINAL: No abdominal pain; nausea, vomiting;  diarrhea or constipation. No hemetemesis, melena or hematochezia.  GENITOURINARY: No dysuria, frequency or hematuria  NEUROLOGICAL: No numbness or weakness  MUSCULOSKELETAL: pain on the soles of the feet  SKIN: No itching, burning, rashes, or lesions   All other review of systems is negative unless indicated above. CONSTITUTIONAL: + chills, no fever  EYES/ENT: No visual changes, eye pain;  No vertigo or throat pain   RESPIRATORY: + cough, no shortness of breath, no orthopnea   CARDIOVASCULAR: + chest pain, no palpitations  GASTROINTESTINAL: No abdominal pain; nausea, vomiting;  diarrhea or constipation. No hemetemesis, melena or hematochezia.  GENITOURINARY: No dysuria, frequency or hematuria  NEUROLOGICAL: No numbness or weakness  MUSCULOSKELETAL: pain on the soles of the feet, no joint swelling  SKIN: No itching, burning, rashes, or lesions   All other review of systems is negative unless indicated above.

## 2018-07-26 DIAGNOSIS — I50.22 CHRONIC SYSTOLIC (CONGESTIVE) HEART FAILURE: ICD-10-CM

## 2018-07-26 DIAGNOSIS — E11.40 TYPE 2 DIABETES MELLITUS WITH DIABETIC NEUROPATHY, UNSPECIFIED: ICD-10-CM

## 2018-07-26 DIAGNOSIS — N18.3 CHRONIC KIDNEY DISEASE, STAGE 3 (MODERATE): ICD-10-CM

## 2018-07-26 DIAGNOSIS — M10.9 GOUT, UNSPECIFIED: ICD-10-CM

## 2018-07-26 DIAGNOSIS — N40.0 BENIGN PROSTATIC HYPERPLASIA WITHOUT LOWER URINARY TRACT SYMPTOMS: ICD-10-CM

## 2018-07-26 DIAGNOSIS — I25.10 ATHEROSCLEROTIC HEART DISEASE OF NATIVE CORONARY ARTERY WITHOUT ANGINA PECTORIS: ICD-10-CM

## 2018-07-26 DIAGNOSIS — A41.9 SEPSIS, UNSPECIFIED ORGANISM: ICD-10-CM

## 2018-07-26 DIAGNOSIS — Z29.9 ENCOUNTER FOR PROPHYLACTIC MEASURES, UNSPECIFIED: ICD-10-CM

## 2018-07-26 DIAGNOSIS — I50.23 ACUTE ON CHRONIC SYSTOLIC (CONGESTIVE) HEART FAILURE: ICD-10-CM

## 2018-07-26 DIAGNOSIS — E11.9 TYPE 2 DIABETES MELLITUS WITHOUT COMPLICATIONS: ICD-10-CM

## 2018-07-26 DIAGNOSIS — R74.8 ABNORMAL LEVELS OF OTHER SERUM ENZYMES: ICD-10-CM

## 2018-07-26 PROBLEM — I10 ESSENTIAL (PRIMARY) HYPERTENSION: Chronic | Status: INACTIVE | Noted: 2018-06-19 | Resolved: 2018-07-25

## 2018-07-26 LAB
APTT BLD: 30 SEC — SIGNIFICANT CHANGE UP (ref 27.5–37.4)
B PERT DNA SPEC QL NAA+PROBE: SIGNIFICANT CHANGE UP
BASOPHILS # BLD AUTO: 0.02 K/UL — SIGNIFICANT CHANGE UP (ref 0–0.2)
BASOPHILS NFR BLD AUTO: 0.2 % — SIGNIFICANT CHANGE UP (ref 0–2)
BUN SERPL-MCNC: 42 MG/DL — HIGH (ref 7–23)
C PNEUM DNA SPEC QL NAA+PROBE: NOT DETECTED — SIGNIFICANT CHANGE UP
CALCIUM SERPL-MCNC: 8.8 MG/DL — SIGNIFICANT CHANGE UP (ref 8.4–10.5)
CHLORIDE SERPL-SCNC: 101 MMOL/L — SIGNIFICANT CHANGE UP (ref 98–107)
CO2 SERPL-SCNC: 22 MMOL/L — SIGNIFICANT CHANGE UP (ref 22–31)
CREAT SERPL-MCNC: 1.95 MG/DL — HIGH (ref 0.5–1.3)
EOSINOPHIL # BLD AUTO: 0.01 K/UL — SIGNIFICANT CHANGE UP (ref 0–0.5)
EOSINOPHIL NFR BLD AUTO: 0.1 % — SIGNIFICANT CHANGE UP (ref 0–6)
FLUAV H1 2009 PAND RNA SPEC QL NAA+PROBE: NOT DETECTED — SIGNIFICANT CHANGE UP
FLUAV H1 RNA SPEC QL NAA+PROBE: NOT DETECTED — SIGNIFICANT CHANGE UP
FLUAV H3 RNA SPEC QL NAA+PROBE: NOT DETECTED — SIGNIFICANT CHANGE UP
FLUAV SUBTYP SPEC NAA+PROBE: SIGNIFICANT CHANGE UP
FLUBV RNA SPEC QL NAA+PROBE: NOT DETECTED — SIGNIFICANT CHANGE UP
GLUCOSE SERPL-MCNC: 226 MG/DL — HIGH (ref 70–99)
HADV DNA SPEC QL NAA+PROBE: NOT DETECTED — SIGNIFICANT CHANGE UP
HCOV 229E RNA SPEC QL NAA+PROBE: NOT DETECTED — SIGNIFICANT CHANGE UP
HCOV HKU1 RNA SPEC QL NAA+PROBE: NOT DETECTED — SIGNIFICANT CHANGE UP
HCOV NL63 RNA SPEC QL NAA+PROBE: NOT DETECTED — SIGNIFICANT CHANGE UP
HCOV OC43 RNA SPEC QL NAA+PROBE: NOT DETECTED — SIGNIFICANT CHANGE UP
HCT VFR BLD CALC: 27.8 % — LOW (ref 39–50)
HGB BLD-MCNC: 9.3 G/DL — LOW (ref 13–17)
HMPV RNA SPEC QL NAA+PROBE: NOT DETECTED — SIGNIFICANT CHANGE UP
HPIV1 RNA SPEC QL NAA+PROBE: NOT DETECTED — SIGNIFICANT CHANGE UP
HPIV2 RNA SPEC QL NAA+PROBE: NOT DETECTED — SIGNIFICANT CHANGE UP
HPIV3 RNA SPEC QL NAA+PROBE: NOT DETECTED — SIGNIFICANT CHANGE UP
HPIV4 RNA SPEC QL NAA+PROBE: NOT DETECTED — SIGNIFICANT CHANGE UP
IMM GRANULOCYTES # BLD AUTO: 0.06 # — SIGNIFICANT CHANGE UP
IMM GRANULOCYTES NFR BLD AUTO: 0.5 % — SIGNIFICANT CHANGE UP (ref 0–1.5)
INR BLD: 1.12 — SIGNIFICANT CHANGE UP (ref 0.88–1.17)
LYMPHOCYTES # BLD AUTO: 1.17 K/UL — SIGNIFICANT CHANGE UP (ref 1–3.3)
LYMPHOCYTES # BLD AUTO: 10.6 % — LOW (ref 13–44)
M PNEUMO DNA SPEC QL NAA+PROBE: NOT DETECTED — SIGNIFICANT CHANGE UP
MAGNESIUM SERPL-MCNC: 2.2 MG/DL — SIGNIFICANT CHANGE UP (ref 1.6–2.6)
MCHC RBC-ENTMCNC: 29.7 PG — SIGNIFICANT CHANGE UP (ref 27–34)
MCHC RBC-ENTMCNC: 33.5 % — SIGNIFICANT CHANGE UP (ref 32–36)
MCV RBC AUTO: 88.8 FL — SIGNIFICANT CHANGE UP (ref 80–100)
MONOCYTES # BLD AUTO: 0.94 K/UL — HIGH (ref 0–0.9)
MONOCYTES NFR BLD AUTO: 8.6 % — SIGNIFICANT CHANGE UP (ref 2–14)
NEUTROPHILS # BLD AUTO: 8.79 K/UL — HIGH (ref 1.8–7.4)
NEUTROPHILS NFR BLD AUTO: 80 % — HIGH (ref 43–77)
NRBC # FLD: 0 — SIGNIFICANT CHANGE UP
PHOSPHATE SERPL-MCNC: 3.7 MG/DL — SIGNIFICANT CHANGE UP (ref 2.5–4.5)
PLATELET # BLD AUTO: 195 K/UL — SIGNIFICANT CHANGE UP (ref 150–400)
PMV BLD: 10.2 FL — SIGNIFICANT CHANGE UP (ref 7–13)
POTASSIUM SERPL-MCNC: 4.3 MMOL/L — SIGNIFICANT CHANGE UP (ref 3.5–5.3)
POTASSIUM SERPL-SCNC: 4.3 MMOL/L — SIGNIFICANT CHANGE UP (ref 3.5–5.3)
PROTHROM AB SERPL-ACNC: 12.5 SEC — SIGNIFICANT CHANGE UP (ref 9.8–13.1)
RBC # BLD: 3.13 M/UL — LOW (ref 4.2–5.8)
RBC # FLD: 14.6 % — HIGH (ref 10.3–14.5)
RSV RNA SPEC QL NAA+PROBE: NOT DETECTED — SIGNIFICANT CHANGE UP
RV+EV RNA SPEC QL NAA+PROBE: NOT DETECTED — SIGNIFICANT CHANGE UP
SODIUM SERPL-SCNC: 136 MMOL/L — SIGNIFICANT CHANGE UP (ref 135–145)
SPECIMEN SOURCE: SIGNIFICANT CHANGE UP
SPECIMEN SOURCE: SIGNIFICANT CHANGE UP
WBC # BLD: 10.99 K/UL — HIGH (ref 3.8–10.5)
WBC # FLD AUTO: 10.99 K/UL — HIGH (ref 3.8–10.5)

## 2018-07-26 PROCEDURE — 99232 SBSQ HOSP IP/OBS MODERATE 35: CPT | Mod: GC

## 2018-07-26 PROCEDURE — 71250 CT THORAX DX C-: CPT | Mod: 26

## 2018-07-26 PROCEDURE — 99233 SBSQ HOSP IP/OBS HIGH 50: CPT

## 2018-07-26 RX ORDER — AZITHROMYCIN 500 MG/1
500 TABLET, FILM COATED ORAL ONCE
Qty: 0 | Refills: 0 | Status: COMPLETED | OUTPATIENT
Start: 2018-07-26 | End: 2018-07-26

## 2018-07-26 RX ORDER — COLCHICINE 0.6 MG
1.2 TABLET ORAL ONCE
Qty: 0 | Refills: 0 | Status: COMPLETED | OUTPATIENT
Start: 2018-07-26 | End: 2018-07-26

## 2018-07-26 RX ORDER — FUROSEMIDE 40 MG
40 TABLET ORAL DAILY
Qty: 0 | Refills: 0 | Status: DISCONTINUED | OUTPATIENT
Start: 2018-07-26 | End: 2018-07-28

## 2018-07-26 RX ORDER — PANTOPRAZOLE SODIUM 20 MG/1
40 TABLET, DELAYED RELEASE ORAL
Qty: 0 | Refills: 0 | Status: DISCONTINUED | OUTPATIENT
Start: 2018-07-26 | End: 2018-07-28

## 2018-07-26 RX ORDER — GABAPENTIN 400 MG/1
300 CAPSULE ORAL
Qty: 0 | Refills: 0 | Status: DISCONTINUED | OUTPATIENT
Start: 2018-07-26 | End: 2018-07-27

## 2018-07-26 RX ORDER — MORPHINE SULFATE 50 MG/1
2 CAPSULE, EXTENDED RELEASE ORAL ONCE
Qty: 0 | Refills: 0 | Status: DISCONTINUED | OUTPATIENT
Start: 2018-07-26 | End: 2018-07-26

## 2018-07-26 RX ORDER — DEXTROSE 50 % IN WATER 50 %
15 SYRINGE (ML) INTRAVENOUS ONCE
Qty: 0 | Refills: 0 | Status: DISCONTINUED | OUTPATIENT
Start: 2018-07-26 | End: 2018-07-28

## 2018-07-26 RX ORDER — ATORVASTATIN CALCIUM 80 MG/1
80 TABLET, FILM COATED ORAL AT BEDTIME
Qty: 0 | Refills: 0 | Status: DISCONTINUED | OUTPATIENT
Start: 2018-07-26 | End: 2018-07-28

## 2018-07-26 RX ORDER — INSULIN GLARGINE 100 [IU]/ML
52 INJECTION, SOLUTION SUBCUTANEOUS AT BEDTIME
Qty: 0 | Refills: 0 | Status: DISCONTINUED | OUTPATIENT
Start: 2018-07-26 | End: 2018-07-27

## 2018-07-26 RX ORDER — ACETAMINOPHEN 500 MG
650 TABLET ORAL EVERY 6 HOURS
Qty: 0 | Refills: 0 | Status: DISCONTINUED | OUTPATIENT
Start: 2018-07-26 | End: 2018-07-28

## 2018-07-26 RX ORDER — DEXTROSE 50 % IN WATER 50 %
12.5 SYRINGE (ML) INTRAVENOUS ONCE
Qty: 0 | Refills: 0 | Status: DISCONTINUED | OUTPATIENT
Start: 2018-07-26 | End: 2018-07-28

## 2018-07-26 RX ORDER — CEFTRIAXONE 500 MG/1
1 INJECTION, POWDER, FOR SOLUTION INTRAMUSCULAR; INTRAVENOUS ONCE
Qty: 0 | Refills: 0 | Status: COMPLETED | OUTPATIENT
Start: 2018-07-26 | End: 2018-07-26

## 2018-07-26 RX ORDER — TRAMADOL HYDROCHLORIDE 50 MG/1
50 TABLET ORAL
Qty: 0 | Refills: 0 | Status: DISCONTINUED | OUTPATIENT
Start: 2018-07-26 | End: 2018-07-28

## 2018-07-26 RX ORDER — DEXTROSE 50 % IN WATER 50 %
25 SYRINGE (ML) INTRAVENOUS ONCE
Qty: 0 | Refills: 0 | Status: DISCONTINUED | OUTPATIENT
Start: 2018-07-26 | End: 2018-07-28

## 2018-07-26 RX ORDER — CEFTRIAXONE 500 MG/1
INJECTION, POWDER, FOR SOLUTION INTRAMUSCULAR; INTRAVENOUS
Qty: 0 | Refills: 0 | Status: DISCONTINUED | OUTPATIENT
Start: 2018-07-26 | End: 2018-07-26

## 2018-07-26 RX ORDER — COLCHICINE 0.6 MG
0.6 TABLET ORAL ONCE
Qty: 0 | Refills: 0 | Status: COMPLETED | OUTPATIENT
Start: 2018-07-26 | End: 2018-07-26

## 2018-07-26 RX ORDER — COLCHICINE 0.6 MG
0.6 TABLET ORAL ONCE
Qty: 0 | Refills: 0 | Status: COMPLETED | OUTPATIENT
Start: 2018-07-27 | End: 2018-07-27

## 2018-07-26 RX ORDER — HYDRALAZINE HCL 50 MG
75 TABLET ORAL THREE TIMES A DAY
Qty: 0 | Refills: 0 | Status: DISCONTINUED | OUTPATIENT
Start: 2018-07-26 | End: 2018-07-28

## 2018-07-26 RX ORDER — TICAGRELOR 90 MG/1
90 TABLET ORAL
Qty: 0 | Refills: 0 | Status: DISCONTINUED | OUTPATIENT
Start: 2018-07-26 | End: 2018-07-28

## 2018-07-26 RX ORDER — INSULIN LISPRO 100/ML
10 VIAL (ML) SUBCUTANEOUS
Qty: 0 | Refills: 0 | Status: DISCONTINUED | OUTPATIENT
Start: 2018-07-26 | End: 2018-07-28

## 2018-07-26 RX ORDER — FUROSEMIDE 40 MG
40 TABLET ORAL EVERY 12 HOURS
Qty: 0 | Refills: 0 | Status: DISCONTINUED | OUTPATIENT
Start: 2018-07-26 | End: 2018-07-26

## 2018-07-26 RX ORDER — ONDANSETRON 8 MG/1
4 TABLET, FILM COATED ORAL EVERY 4 HOURS
Qty: 0 | Refills: 0 | Status: DISCONTINUED | OUTPATIENT
Start: 2018-07-26 | End: 2018-07-28

## 2018-07-26 RX ORDER — INSULIN LISPRO 100/ML
VIAL (ML) SUBCUTANEOUS
Qty: 0 | Refills: 0 | Status: DISCONTINUED | OUTPATIENT
Start: 2018-07-26 | End: 2018-07-28

## 2018-07-26 RX ORDER — GLUCAGON INJECTION, SOLUTION 0.5 MG/.1ML
1 INJECTION, SOLUTION SUBCUTANEOUS ONCE
Qty: 0 | Refills: 0 | Status: DISCONTINUED | OUTPATIENT
Start: 2018-07-26 | End: 2018-07-28

## 2018-07-26 RX ORDER — INSULIN LISPRO 100/ML
VIAL (ML) SUBCUTANEOUS AT BEDTIME
Qty: 0 | Refills: 0 | Status: DISCONTINUED | OUTPATIENT
Start: 2018-07-26 | End: 2018-07-28

## 2018-07-26 RX ORDER — TAMSULOSIN HYDROCHLORIDE 0.4 MG/1
0.4 CAPSULE ORAL AT BEDTIME
Qty: 0 | Refills: 0 | Status: DISCONTINUED | OUTPATIENT
Start: 2018-07-26 | End: 2018-07-28

## 2018-07-26 RX ORDER — ALLOPURINOL 300 MG
300 TABLET ORAL DAILY
Qty: 0 | Refills: 0 | Status: DISCONTINUED | OUTPATIENT
Start: 2018-07-26 | End: 2018-07-27

## 2018-07-26 RX ORDER — SODIUM CHLORIDE 9 MG/ML
1000 INJECTION, SOLUTION INTRAVENOUS
Qty: 0 | Refills: 0 | Status: DISCONTINUED | OUTPATIENT
Start: 2018-07-26 | End: 2018-07-26

## 2018-07-26 RX ORDER — ASPIRIN/CALCIUM CARB/MAGNESIUM 324 MG
81 TABLET ORAL DAILY
Qty: 0 | Refills: 0 | Status: DISCONTINUED | OUTPATIENT
Start: 2018-07-26 | End: 2018-07-28

## 2018-07-26 RX ORDER — LORATADINE 10 MG/1
10 TABLET ORAL DAILY
Qty: 0 | Refills: 0 | Status: DISCONTINUED | OUTPATIENT
Start: 2018-07-26 | End: 2018-07-28

## 2018-07-26 RX ORDER — FUROSEMIDE 40 MG
40 TABLET ORAL ONCE
Qty: 0 | Refills: 0 | Status: COMPLETED | OUTPATIENT
Start: 2018-07-26 | End: 2018-07-26

## 2018-07-26 RX ORDER — AZITHROMYCIN 500 MG/1
TABLET, FILM COATED ORAL
Qty: 0 | Refills: 0 | Status: DISCONTINUED | OUTPATIENT
Start: 2018-07-26 | End: 2018-07-26

## 2018-07-26 RX ADMIN — Medication 1000 MILLIGRAM(S): at 01:40

## 2018-07-26 RX ADMIN — TRAMADOL HYDROCHLORIDE 50 MILLIGRAM(S): 50 TABLET ORAL at 15:17

## 2018-07-26 RX ADMIN — ISOSORBIDE DINITRATE 20 MILLIGRAM(S): 5 TABLET ORAL at 06:25

## 2018-07-26 RX ADMIN — Medication 40 MILLIGRAM(S): at 18:51

## 2018-07-26 RX ADMIN — CEFTRIAXONE 100 GRAM(S): 500 INJECTION, POWDER, FOR SOLUTION INTRAMUSCULAR; INTRAVENOUS at 01:40

## 2018-07-26 RX ADMIN — Medication 10 UNIT(S): at 18:13

## 2018-07-26 RX ADMIN — ONDANSETRON 4 MILLIGRAM(S): 8 TABLET, FILM COATED ORAL at 05:59

## 2018-07-26 RX ADMIN — MORPHINE SULFATE 2 MILLIGRAM(S): 50 CAPSULE, EXTENDED RELEASE ORAL at 04:10

## 2018-07-26 RX ADMIN — GABAPENTIN 300 MILLIGRAM(S): 400 CAPSULE ORAL at 18:50

## 2018-07-26 RX ADMIN — TRAMADOL HYDROCHLORIDE 50 MILLIGRAM(S): 50 TABLET ORAL at 14:07

## 2018-07-26 RX ADMIN — Medication 300 MILLIGRAM(S): at 14:03

## 2018-07-26 RX ADMIN — Medication 0.6 MILLIGRAM(S): at 08:47

## 2018-07-26 RX ADMIN — Medication 10 UNIT(S): at 14:05

## 2018-07-26 RX ADMIN — CARVEDILOL PHOSPHATE 12.5 MILLIGRAM(S): 80 CAPSULE, EXTENDED RELEASE ORAL at 06:25

## 2018-07-26 RX ADMIN — Medication 40 MILLIGRAM(S): at 11:54

## 2018-07-26 RX ADMIN — ATORVASTATIN CALCIUM 80 MILLIGRAM(S): 80 TABLET, FILM COATED ORAL at 22:27

## 2018-07-26 RX ADMIN — Medication 40 MILLIGRAM(S): at 01:40

## 2018-07-26 RX ADMIN — Medication 81 MILLIGRAM(S): at 14:03

## 2018-07-26 RX ADMIN — CARVEDILOL PHOSPHATE 12.5 MILLIGRAM(S): 80 CAPSULE, EXTENDED RELEASE ORAL at 18:50

## 2018-07-26 RX ADMIN — Medication 2: at 09:30

## 2018-07-26 RX ADMIN — Medication 600 MILLIGRAM(S): at 19:26

## 2018-07-26 RX ADMIN — ISOSORBIDE DINITRATE 20 MILLIGRAM(S): 5 TABLET ORAL at 22:27

## 2018-07-26 RX ADMIN — MORPHINE SULFATE 2 MILLIGRAM(S): 50 CAPSULE, EXTENDED RELEASE ORAL at 04:29

## 2018-07-26 RX ADMIN — TAMSULOSIN HYDROCHLORIDE 0.4 MILLIGRAM(S): 0.4 CAPSULE ORAL at 22:27

## 2018-07-26 RX ADMIN — AZITHROMYCIN 250 MILLIGRAM(S): 500 TABLET, FILM COATED ORAL at 02:30

## 2018-07-26 RX ADMIN — Medication 10 UNIT(S): at 09:56

## 2018-07-26 RX ADMIN — TRAMADOL HYDROCHLORIDE 50 MILLIGRAM(S): 50 TABLET ORAL at 06:25

## 2018-07-26 RX ADMIN — Medication 1.2 MILLIGRAM(S): at 08:32

## 2018-07-26 RX ADMIN — TICAGRELOR 90 MILLIGRAM(S): 90 TABLET ORAL at 18:50

## 2018-07-26 RX ADMIN — LORATADINE 10 MILLIGRAM(S): 10 TABLET ORAL at 14:05

## 2018-07-26 RX ADMIN — Medication 75 MILLIGRAM(S): at 22:27

## 2018-07-26 RX ADMIN — TRAMADOL HYDROCHLORIDE 50 MILLIGRAM(S): 50 TABLET ORAL at 07:39

## 2018-07-26 RX ADMIN — Medication 2: at 14:04

## 2018-07-26 RX ADMIN — PANTOPRAZOLE SODIUM 40 MILLIGRAM(S): 20 TABLET, DELAYED RELEASE ORAL at 09:57

## 2018-07-26 RX ADMIN — Medication 650 MILLIGRAM(S): at 15:17

## 2018-07-26 NOTE — CHART NOTE - NSCHARTNOTEFT_GEN_A_CORE
RN notified that patient's bedtime FS: 61. Patient asymptomatic and received apple juice. The FS improved to 98 -->115. Will hold lantus tonight given episode of hypoglycemia. will continue to monitor

## 2018-07-26 NOTE — PROGRESS NOTE ADULT - SUBJECTIVE AND OBJECTIVE BOX
Patient is a 58y old  Male who presents with a chief complaint of sharp chest pain x 2 days    SUBJECTIVE / OVERNIGHT EVENTS:    Patient still with R sided CP radiates up the neck  Pain is reproducible on palpation  Patient reports continued pain of R plantar surface of foot, also L middle finger pain  Tolerating diet, still having fevers  No dysuria  No rashes   No diarrhea     MEDICATIONS  (STANDING):  allopurinol 300 milliGRAM(s) Oral daily  aspirin enteric coated 81 milliGRAM(s) Oral daily  atorvastatin 80 milliGRAM(s) Oral at bedtime  azithromycin  IVPB      carvedilol 12.5 milliGRAM(s) Oral every 12 hours  cefTRIAXone   IVPB      dextrose 5%. 1000 milliLiter(s) (50 mL/Hr) IV Continuous <Continuous>  dextrose 50% Injectable 12.5 Gram(s) IV Push once  dextrose 50% Injectable 25 Gram(s) IV Push once  dextrose 50% Injectable 25 Gram(s) IV Push once  furosemide    Tablet 40 milliGRAM(s) Oral daily  gabapentin 300 milliGRAM(s) Oral two times a day  guaiFENesin  milliGRAM(s) Oral every 12 hours  hydrALAZINE 75 milliGRAM(s) Oral three times a day  insulin glargine Injectable (LANTUS) 48 Unit(s) SubCutaneous at bedtime  insulin lispro (HumaLOG) corrective regimen sliding scale   SubCutaneous three times a day before meals  insulin lispro (HumaLOG) corrective regimen sliding scale   SubCutaneous at bedtime  insulin lispro Injectable (HumaLOG) 10 Unit(s) SubCutaneous three times a day before meals  isosorbide   dinitrate Tablet (ISORDIL) 20 milliGRAM(s) Oral three times a day  loratadine 10 milliGRAM(s) Oral daily  pantoprazole    Tablet 40 milliGRAM(s) Oral before breakfast  tamsulosin 0.4 milliGRAM(s) Oral at bedtime  ticagrelor 90 milliGRAM(s) Oral two times a day    MEDICATIONS  (PRN):  acetaminophen   Tablet 650 milliGRAM(s) Oral every 6 hours PRN For Temp greater than 38 C (100.4 F)  dextrose 40% Gel 15 Gram(s) Oral once PRN Blood Glucose LESS THAN 70 milliGRAM(s)/deciliter  glucagon  Injectable 1 milliGRAM(s) IntraMuscular once PRN Glucose LESS THAN 70 milligrams/deciliter  ondansetron Injectable 4 milliGRAM(s) IV Push every 4 hours PRN Nausea and/or Vomiting  traMADol 50 milliGRAM(s) Oral two times a day PRN Moderate Pain (4 - 6) and severe (7-10)    T(C): 37.3 (07-26-18 @ 16:57), Max: 38.3 (07-25-18 @ 22:49)  HR: 90 (07-26-18 @ 16:57) (86 - 98)  BP: 131/59 (07-26-18 @ 16:57) (120/59 - 203/90)  RR: 18 (07-26-18 @ 16:57) (15 - 161)  SpO2: 97% (07-26-18 @ 16:57) (97% - 100%)    CAPILLARY BLOOD GLUCOSE  POCT Blood Glucose.: 108 mg/dL (26 Jul 2018 17:05)  POCT Blood Glucose.: 216 mg/dL (26 Jul 2018 12:22)  POCT Blood Glucose.: 233 mg/dL (26 Jul 2018 09:22)  POCT Blood Glucose.: 300 mg/dL (25 Jul 2018 21:15)    PHYSICAL EXAM:  GENERAL: NAD laying in bed  HEAD:  Atraumatic, Normocephalic  EYES: EOMI, PERRLA, conjunctiva and sclera clear  NECK: Supple, neck appears sanches on R>L  CHEST/LUNG: Clear to auscultation bilaterally; No wheeze; TTP very acutely in R superior chest wall/ribs  HEART: Regular rate and rhythm; No murmurs, rubs, or gallops  ABDOMEN: Soft, Nontender, mildly distended; Bowel sounds present  EXTREMITIES:   warm and well perfused, No clubbing, cyanosis, or edema; no pain erythema of wrist/knee/elbow/ankle; R foot pain on plantar surface lateral to arch, no joint tenderness or warmth of foot, L hand middle finger PIP swelling  PSYCH: AAOx3  NEUROLOGY: non-focal  SKIN: No rashes or lesions    LABS:                        9.3    10.99 )-----------( 195      ( 26 Jul 2018 07:05 )             27.8     07-26    136  |  101  |  42<H>  ----------------------------<  226<H>  4.3   |  22  |  1.95<H>    Ca    8.8      26 Jul 2018 07:05  Phos  3.7     07-26  Mg     2.2     07-26    TPro  7.9  /  Alb  4.1  /  TBili  0.4  /  DBili  x   /  AST  12  /  ALT  9   /  AlkPhos  92  07-25  PT/INR - ( 26 Jul 2018 07:05 )   PT: 12.5 SEC;   INR: 1.12     PTT - ( 26 Jul 2018 07:05 )  PTT:30.0 SEC  CARDIAC MARKERS ( 25 Jul 2018 17:10 )  x     / x     / 109 u/L / 1.31 ng/mL / x          Microbiology:   [facility] 07-25 @ 16:30  pH: 7.41/pCO2: 38/pO2: 50/HCO3: 24/lactate: 1.0  [facility] 07-25 @ 15:20  pH: 7.39/pCO2: 40/pO2: 35/HCO3: 23/lactate: 1.3      Consultant(s) Notes Reviewed:  cards  Care Discussed with Consultants/Other Providers: Patient is a 58y old  Male who presents with a chief complaint of sharp chest pain x 2 days    SUBJECTIVE / OVERNIGHT EVENTS:    Patient still with R sided CP radiates up the neck  Pain is reproducible on palpation  Patient reports continued pain of R plantar surface of foot, also L middle finger pain  Tolerating diet, still having fevers  No dysuria  No rashes   No diarrhea     MEDICATIONS  (STANDING):  allopurinol 300 milliGRAM(s) Oral daily  aspirin enteric coated 81 milliGRAM(s) Oral daily  atorvastatin 80 milliGRAM(s) Oral at bedtime  carvedilol 12.5 milliGRAM(s) Oral every 12 hours  dextrose 5%. 1000 milliLiter(s) (50 mL/Hr) IV Continuous <Continuous>  dextrose 50% Injectable 12.5 Gram(s) IV Push once  dextrose 50% Injectable 25 Gram(s) IV Push once  dextrose 50% Injectable 25 Gram(s) IV Push once  furosemide    Tablet 40 milliGRAM(s) Oral daily  gabapentin 300 milliGRAM(s) Oral two times a day  guaiFENesin  milliGRAM(s) Oral every 12 hours  hydrALAZINE 75 milliGRAM(s) Oral three times a day  insulin glargine Injectable (LANTUS) 48 Unit(s) SubCutaneous at bedtime  insulin lispro (HumaLOG) corrective regimen sliding scale   SubCutaneous three times a day before meals  insulin lispro (HumaLOG) corrective regimen sliding scale   SubCutaneous at bedtime  insulin lispro Injectable (HumaLOG) 10 Unit(s) SubCutaneous three times a day before meals  isosorbide   dinitrate Tablet (ISORDIL) 20 milliGRAM(s) Oral three times a day  loratadine 10 milliGRAM(s) Oral daily  pantoprazole    Tablet 40 milliGRAM(s) Oral before breakfast  tamsulosin 0.4 milliGRAM(s) Oral at bedtime  ticagrelor 90 milliGRAM(s) Oral two times a day    MEDICATIONS  (PRN):  acetaminophen   Tablet 650 milliGRAM(s) Oral every 6 hours PRN For Temp greater than 38 C (100.4 F)  dextrose 40% Gel 15 Gram(s) Oral once PRN Blood Glucose LESS THAN 70 milliGRAM(s)/deciliter  glucagon  Injectable 1 milliGRAM(s) IntraMuscular once PRN Glucose LESS THAN 70 milligrams/deciliter  ondansetron Injectable 4 milliGRAM(s) IV Push every 4 hours PRN Nausea and/or Vomiting  traMADol 50 milliGRAM(s) Oral two times a day PRN Moderate Pain (4 - 6) and severe (7-10)    T(C): 37.3 (07-26-18 @ 16:57), Max: 38.3 (07-25-18 @ 22:49)  HR: 90 (07-26-18 @ 16:57) (86 - 98)  BP: 131/59 (07-26-18 @ 16:57) (120/59 - 203/90)  RR: 18 (07-26-18 @ 16:57) (15 - 161)  SpO2: 97% (07-26-18 @ 16:57) (97% - 100%)    CAPILLARY BLOOD GLUCOSE  POCT Blood Glucose.: 108 mg/dL (26 Jul 2018 17:05)  POCT Blood Glucose.: 216 mg/dL (26 Jul 2018 12:22)  POCT Blood Glucose.: 233 mg/dL (26 Jul 2018 09:22)  POCT Blood Glucose.: 300 mg/dL (25 Jul 2018 21:15)    PHYSICAL EXAM:  GENERAL: NAD laying in bed  HEAD:  Atraumatic, Normocephalic  EYES: EOMI, PERRLA, conjunctiva and sclera clear  NECK: Supple, neck appears sanches on R>L  CHEST/LUNG: Clear to auscultation bilaterally; No wheeze; TTP very acutely in R superior chest wall/ribs  HEART: Regular rate and rhythm; No murmurs, rubs, or gallops  ABDOMEN: Soft, Nontender, mildly distended; Bowel sounds present  EXTREMITIES:   warm and well perfused, No clubbing, cyanosis, or edema; no pain erythema of wrist/knee/elbow/ankle; R foot pain on plantar surface lateral to arch, no joint tenderness or warmth of foot, L hand middle finger PIP swelling  PSYCH: AAOx3  NEUROLOGY: non-focal  SKIN: No rashes or lesions    LABS:                        9.3    10.99 )-----------( 195      ( 26 Jul 2018 07:05 )             27.8     07-26    136  |  101  |  42<H>  ----------------------------<  226<H>  4.3   |  22  |  1.95<H>    Ca    8.8      26 Jul 2018 07:05  Phos  3.7     07-26  Mg     2.2     07-26    TPro  7.9  /  Alb  4.1  /  TBili  0.4  /  DBili  x   /  AST  12  /  ALT  9   /  AlkPhos  92  07-25  PT/INR - ( 26 Jul 2018 07:05 )   PT: 12.5 SEC;   INR: 1.12     PTT - ( 26 Jul 2018 07:05 )  PTT:30.0 SEC  CARDIAC MARKERS ( 25 Jul 2018 17:10 )  x     / x     / 109 u/L / 1.31 ng/mL / x        Microbiology:   [facility] 07-25 @ 16:30  pH: 7.41/pCO2: 38/pO2: 50/HCO3: 24/lactate: 1.0  [facility] 07-25 @ 15:20  pH: 7.39/pCO2: 40/pO2: 35/HCO3: 23/lactate: 1.3      Consultant(s) Notes Reviewed:  cards  Care Discussed with Consultants/Other Providers:

## 2018-07-26 NOTE — PROGRESS NOTE ADULT - PROBLEM SELECTOR PLAN 4
Patient with recurrent flares, last flare in bilateral ankles in June 2018. Currently with severe pain in midfoot plantar aspect. Lasix places him at risk however location on foot appear not classic with such.  Middle finger of L hand appears to have tophi? and also is actively hurting  - c/w allopurinol  - c/w colchicine   - Tylenol prn for pain Patient with recurrent flares, last flare in bilateral ankles in June 2018. Currently with severe pain in midfoot plantar aspect possibly 2/2 DJD. Lasix places him at risk however location on foot appear not classic with such.  Middle finger of L hand appears to have tophi? and also is actively hurting  - c/w allopurinol  - c/w colchicine   - Tylenol prn for pain  - now walking with cane, PT eval pending

## 2018-07-26 NOTE — PROGRESS NOTE ADULT - PROBLEM SELECTOR PLAN 3
Patient does not appear overloaded and this time  - c/w lasix 40 mg daily  - f/u pending TTE  - f/u cards recs Patient does not appear overloaded and this time  - c/w lasix 40 mg daily  - c/w coreg, hydral/imdur  - f/u pending TTE  - f/u cards recs

## 2018-07-26 NOTE — PROGRESS NOTE ADULT - PROBLEM SELECTOR PLAN 1
Ruling out sepsis as meeting SIRs criteria due to fever >102, tachycardia to 101, and leukocytosis 12; thus far no dinah localizing symptoms aside from cough (negative CT chest, RVP).  Blood cx NG x 24 hours.  No urinary symptoms.  No abdominal pain or diarrhea.  No skin lesions.  Has foot and finger pain not classic for gout however per patient this has happened before.  Although RVP negative sx most consistent with viral syndrome vs gout flare (although fever seems a bit high for that)  - s/p 2L IVF in ED  - hold further abx  - f/u blood cultures   - tylenol prn for fevers  - robitussin for cough  - check procal Ruling out sepsis as meeting SIRs criteria due to fever >102, tachycardia to 101, and leukocytosis 12; thus far no dinah localizing symptoms aside from cough (negative CT chest, RVP).  Blood cx NG x 24 hours.  No urinary symptoms.  No abdominal pain or diarrhea.  No skin lesions.  Has foot and finger pain not classic for gout however per patient this has happened before.  Although RVP negative sx most consistent with viral syndrome vs gout flare (although fever seems a bit high for that)  - s/p 2L IVF in ED  - hold further abx  - continue to f/u blood cultures   - tylenol prn for fevers  - guaifenesin for cough  - check procal

## 2018-07-26 NOTE — PROGRESS NOTE ADULT - PROBLEM SELECTOR PLAN 7
Last HbA1c 8.1. On Lantus 60 mg at bedtime and Humalog 12 units premeal.  Suboptimal control with CKD, neuropathy, and retinopathy  - c/w lantus 50 units at bedtime  - c/w humalog 10 units premeal  - c/w FSG and ISS qAC and qhs  - DASH TLC consistent carb diet Last HbA1c 8.1. On Lantus 60 mg at bedtime and Humalog 12 units premeal.  Suboptimal control with CKD, neuropathy, and retinopathy  - increase lantus 52 units at bedtime  - c/w humalog 10 units premeal  - c/w FSG and ISS qAC and qhs  - DASH TLC consistent carb diet

## 2018-07-27 ENCOUNTER — OTHER (OUTPATIENT)
Age: 58
End: 2018-07-27

## 2018-07-27 DIAGNOSIS — E11.649 TYPE 2 DIABETES MELLITUS WITH HYPOGLYCEMIA WITHOUT COMA: ICD-10-CM

## 2018-07-27 DIAGNOSIS — E78.5 HYPERLIPIDEMIA, UNSPECIFIED: ICD-10-CM

## 2018-07-27 DIAGNOSIS — I10 ESSENTIAL (PRIMARY) HYPERTENSION: ICD-10-CM

## 2018-07-27 LAB
BUN SERPL-MCNC: 40 MG/DL — HIGH (ref 7–23)
CALCIUM SERPL-MCNC: 8.7 MG/DL — SIGNIFICANT CHANGE UP (ref 8.4–10.5)
CHLORIDE SERPL-SCNC: 97 MMOL/L — LOW (ref 98–107)
CO2 SERPL-SCNC: 23 MMOL/L — SIGNIFICANT CHANGE UP (ref 22–31)
CREAT SERPL-MCNC: 2.08 MG/DL — HIGH (ref 0.5–1.3)
GLUCOSE SERPL-MCNC: 181 MG/DL — HIGH (ref 70–99)
HCT VFR BLD CALC: 26.5 % — LOW (ref 39–50)
HGB BLD-MCNC: 8.9 G/DL — LOW (ref 13–17)
L PNEUMO AG UR QL: NEGATIVE — SIGNIFICANT CHANGE UP
MCHC RBC-ENTMCNC: 29.5 PG — SIGNIFICANT CHANGE UP (ref 27–34)
MCHC RBC-ENTMCNC: 33.6 % — SIGNIFICANT CHANGE UP (ref 32–36)
MCV RBC AUTO: 87.7 FL — SIGNIFICANT CHANGE UP (ref 80–100)
NRBC # FLD: 0 — SIGNIFICANT CHANGE UP
PLATELET # BLD AUTO: 213 K/UL — SIGNIFICANT CHANGE UP (ref 150–400)
PMV BLD: 10.6 FL — SIGNIFICANT CHANGE UP (ref 7–13)
POTASSIUM SERPL-MCNC: 4.1 MMOL/L — SIGNIFICANT CHANGE UP (ref 3.5–5.3)
POTASSIUM SERPL-SCNC: 4.1 MMOL/L — SIGNIFICANT CHANGE UP (ref 3.5–5.3)
PROCALCITONIN SERPL-MCNC: 0.82 NG/ML — HIGH (ref 0.02–0.1)
RBC # BLD: 3.02 M/UL — LOW (ref 4.2–5.8)
RBC # FLD: 14.6 % — HIGH (ref 10.3–14.5)
SODIUM SERPL-SCNC: 136 MMOL/L — SIGNIFICANT CHANGE UP (ref 135–145)
URATE SERPL-MCNC: 10.5 MG/DL — HIGH (ref 3.4–8.8)
WBC # BLD: 10.28 K/UL — SIGNIFICANT CHANGE UP (ref 3.8–10.5)
WBC # FLD AUTO: 10.28 K/UL — SIGNIFICANT CHANGE UP (ref 3.8–10.5)

## 2018-07-27 PROCEDURE — 99255 IP/OBS CONSLTJ NEW/EST HI 80: CPT

## 2018-07-27 PROCEDURE — 99233 SBSQ HOSP IP/OBS HIGH 50: CPT

## 2018-07-27 PROCEDURE — 93306 TTE W/DOPPLER COMPLETE: CPT | Mod: 26

## 2018-07-27 PROCEDURE — 99232 SBSQ HOSP IP/OBS MODERATE 35: CPT | Mod: GC

## 2018-07-27 PROCEDURE — 99254 IP/OBS CNSLTJ NEW/EST MOD 60: CPT

## 2018-07-27 RX ORDER — INSULIN GLARGINE 100 [IU]/ML
40 INJECTION, SOLUTION SUBCUTANEOUS AT BEDTIME
Qty: 0 | Refills: 0 | Status: DISCONTINUED | OUTPATIENT
Start: 2018-07-27 | End: 2018-07-28

## 2018-07-27 RX ORDER — ALLOPURINOL 300 MG
400 TABLET ORAL DAILY
Qty: 0 | Refills: 0 | Status: DISCONTINUED | OUTPATIENT
Start: 2018-07-28 | End: 2018-07-28

## 2018-07-27 RX ORDER — COLCHICINE 0.6 MG
0.6 TABLET ORAL
Qty: 0 | Refills: 0 | Status: DISCONTINUED | OUTPATIENT
Start: 2018-07-28 | End: 2018-07-28

## 2018-07-27 RX ORDER — GABAPENTIN 400 MG/1
300 CAPSULE ORAL THREE TIMES A DAY
Qty: 0 | Refills: 0 | Status: DISCONTINUED | OUTPATIENT
Start: 2018-07-27 | End: 2018-07-28

## 2018-07-27 RX ADMIN — CARVEDILOL PHOSPHATE 12.5 MILLIGRAM(S): 80 CAPSULE, EXTENDED RELEASE ORAL at 06:42

## 2018-07-27 RX ADMIN — TICAGRELOR 90 MILLIGRAM(S): 90 TABLET ORAL at 18:21

## 2018-07-27 RX ADMIN — Medication 75 MILLIGRAM(S): at 14:21

## 2018-07-27 RX ADMIN — Medication 81 MILLIGRAM(S): at 11:55

## 2018-07-27 RX ADMIN — TRAMADOL HYDROCHLORIDE 50 MILLIGRAM(S): 50 TABLET ORAL at 02:27

## 2018-07-27 RX ADMIN — Medication 1: at 13:24

## 2018-07-27 RX ADMIN — Medication 10 UNIT(S): at 08:52

## 2018-07-27 RX ADMIN — TAMSULOSIN HYDROCHLORIDE 0.4 MILLIGRAM(S): 0.4 CAPSULE ORAL at 22:42

## 2018-07-27 RX ADMIN — Medication 600 MILLIGRAM(S): at 06:42

## 2018-07-27 RX ADMIN — Medication 600 MILLIGRAM(S): at 18:20

## 2018-07-27 RX ADMIN — Medication 75 MILLIGRAM(S): at 06:42

## 2018-07-27 RX ADMIN — Medication 10 MILLIGRAM(S): at 19:22

## 2018-07-27 RX ADMIN — ONDANSETRON 4 MILLIGRAM(S): 8 TABLET, FILM COATED ORAL at 14:21

## 2018-07-27 RX ADMIN — INSULIN GLARGINE 40 UNIT(S): 100 INJECTION, SOLUTION SUBCUTANEOUS at 22:43

## 2018-07-27 RX ADMIN — TICAGRELOR 90 MILLIGRAM(S): 90 TABLET ORAL at 06:42

## 2018-07-27 RX ADMIN — LORATADINE 10 MILLIGRAM(S): 10 TABLET ORAL at 11:55

## 2018-07-27 RX ADMIN — ISOSORBIDE DINITRATE 20 MILLIGRAM(S): 5 TABLET ORAL at 14:20

## 2018-07-27 RX ADMIN — Medication 2: at 08:52

## 2018-07-27 RX ADMIN — ATORVASTATIN CALCIUM 80 MILLIGRAM(S): 80 TABLET, FILM COATED ORAL at 22:42

## 2018-07-27 RX ADMIN — ISOSORBIDE DINITRATE 20 MILLIGRAM(S): 5 TABLET ORAL at 06:42

## 2018-07-27 RX ADMIN — TRAMADOL HYDROCHLORIDE 50 MILLIGRAM(S): 50 TABLET ORAL at 03:09

## 2018-07-27 RX ADMIN — Medication 40 MILLIGRAM(S): at 06:42

## 2018-07-27 RX ADMIN — Medication 2: at 18:02

## 2018-07-27 RX ADMIN — ISOSORBIDE DINITRATE 20 MILLIGRAM(S): 5 TABLET ORAL at 22:42

## 2018-07-27 RX ADMIN — GABAPENTIN 300 MILLIGRAM(S): 400 CAPSULE ORAL at 06:42

## 2018-07-27 RX ADMIN — GABAPENTIN 300 MILLIGRAM(S): 400 CAPSULE ORAL at 22:42

## 2018-07-27 RX ADMIN — Medication 75 MILLIGRAM(S): at 22:42

## 2018-07-27 RX ADMIN — Medication 10 UNIT(S): at 18:02

## 2018-07-27 RX ADMIN — Medication 300 MILLIGRAM(S): at 11:55

## 2018-07-27 RX ADMIN — PANTOPRAZOLE SODIUM 40 MILLIGRAM(S): 20 TABLET, DELAYED RELEASE ORAL at 06:42

## 2018-07-27 RX ADMIN — Medication 10 UNIT(S): at 13:09

## 2018-07-27 RX ADMIN — Medication 0.6 MILLIGRAM(S): at 02:27

## 2018-07-27 RX ADMIN — CARVEDILOL PHOSPHATE 12.5 MILLIGRAM(S): 80 CAPSULE, EXTENDED RELEASE ORAL at 18:21

## 2018-07-27 RX ADMIN — Medication 1: at 22:42

## 2018-07-27 NOTE — PROGRESS NOTE ADULT - PROBLEM SELECTOR PLAN 3
Patient does not appear overloaded and this time  - c/w lasix 40 mg daily  - c/w coreg, hydral/imdur  - TTE, f/u with cards re: need  - state no need for stress   - f/u cards recs

## 2018-07-27 NOTE — PROGRESS NOTE ADULT - SUBJECTIVE AND OBJECTIVE BOX
Patient is a 58y old  Male who presents with a chief complaint of sharp chest pain x 2 days    SUBJECTIVE / OVERNIGHT EVENTS:    Patient reports still with R foot pain, perhaps a little better  R chest wall pain still present on palpation also better  No BM  Had hypoglycemia to 61 this am, asymptomatic, recent OP hypo while at work "passed out"  Mild nausea this am, still with dry cough, no sore throat, ear pain, dysuria     MEDICATIONS  (STANDING):  allopurinol 300 milliGRAM(s) Oral daily  aspirin enteric coated 81 milliGRAM(s) Oral daily  atorvastatin 80 milliGRAM(s) Oral at bedtime  carvedilol 12.5 milliGRAM(s) Oral every 12 hours  furosemide    Tablet 40 milliGRAM(s) Oral daily  gabapentin 300 milliGRAM(s) Oral two times a day  guaiFENesin  milliGRAM(s) Oral every 12 hours  hydrALAZINE 75 milliGRAM(s) Oral three times a day  insulin glargine Injectable (LANTUS) 52 Unit(s) SubCutaneous at bedtime  insulin lispro (HumaLOG) corrective regimen sliding scale   SubCutaneous three times a day before meals  insulin lispro (HumaLOG) corrective regimen sliding scale   SubCutaneous at bedtime  insulin lispro Injectable (HumaLOG) 10 Unit(s) SubCutaneous three times a day before meals  isosorbide   dinitrate Tablet (ISORDIL) 20 milliGRAM(s) Oral three times a day  loratadine 10 milliGRAM(s) Oral daily  pantoprazole    Tablet 40 milliGRAM(s) Oral before breakfast  tamsulosin 0.4 milliGRAM(s) Oral at bedtime  ticagrelor 90 milliGRAM(s) Oral two times a day    MEDICATIONS  (PRN):  acetaminophen   Tablet 650 milliGRAM(s) Oral every 6 hours PRN For Temp greater than 38 C (100.4 F)  dextrose 40% Gel 15 Gram(s) Oral once PRN Blood Glucose LESS THAN 70 milliGRAM(s)/deciliter  glucagon  Injectable 1 milliGRAM(s) IntraMuscular once PRN Glucose LESS THAN 70 milligrams/deciliter  ondansetron Injectable 4 milliGRAM(s) IV Push every 4 hours PRN Nausea and/or Vomiting  traMADol 50 milliGRAM(s) Oral two times a day PRN Moderate Pain (4 - 6) and severe (7-10)    T(C): 36.9 (07-27-18 @ 05:46), Max: 38 (07-26-18 @ 15:10)  HR: 95 (07-27-18 @ 05:46) (80 - 95)  BP: 116/55 (07-27-18 @ 05:46) (116/55 - 160/73)  RR: 18 (07-27-18 @ 05:46) (15 - 18)  SpO2: 97% (07-27-18 @ 05:46) (97% - 100%)    CAPILLARY BLOOD GLUCOSE  POCT Blood Glucose.: 206 mg/dL (27 Jul 2018 08:44)  POCT Blood Glucose.: 115 mg/dL (26 Jul 2018 23:08)  POCT Blood Glucose.: 98 mg/dL (26 Jul 2018 22:24)  POCT Blood Glucose.: 61 mg/dL (26 Jul 2018 21:22)  POCT Blood Glucose.: 108 mg/dL (26 Jul 2018 17:05)  POCT Blood Glucose.: 216 mg/dL (26 Jul 2018 12:22)    I&O's Summary    26 Jul 2018 07:01  -  27 Jul 2018 07:00  --------------------------------------------------------  IN: 300 mL / OUT: 0 mL / NET: 300 mL    27 Jul 2018 07:01  -  27 Jul 2018 11:20  --------------------------------------------------------  IN: 200 mL / OUT: 0 mL / NET: 200 mL    PHYSICAL EXAM:  GENERAL: NAD laying in bed  HEAD:  Atraumatic, Normocephalic  EYES: EOMI, PERRLA, conjunctiva and sclera clear  NECK: Supple, neck appears sanches on R>L  CHEST/LUNG: Clear to auscultation bilaterally; No wheeze; TTP R superior chest wall/ribs  HEART: Regular rate and rhythm; No murmurs, rubs, or gallops  ABDOMEN: Soft, Nontender, mildly distended; Bowel sounds present  EXTREMITIES:   warm and well perfused, No clubbing, cyanosis, or edema; no pain erythema of wrist/knee/elbow/ankle; R foot pain on plantar surface lateral to arch, no joint tenderness or warmth of foot, L hand middle finger PIP swelling (chronic, less painful today)  PSYCH: AAOx3  NEUROLOGY: non-focal  SKIN: No rashes or lesions    LABS:                        8.9    10.28 )-----------( 213      ( 27 Jul 2018 06:00 )             26.5     07-27    136  |  97<L>  |  40<H>  ----------------------------<  181<H>  4.1   |  23  |  2.08<H>    Ca    8.7      27 Jul 2018 06:00  Phos  3.7     07-26  Mg     2.2     07-26    TPro  7.9  /  Alb  4.1  /  TBili  0.4  /  DBili  x   /  AST  12  /  ALT  9   /  AlkPhos  92  07-25    PT/INR - ( 26 Jul 2018 07:05 )   PT: 12.5 SEC;   INR: 1.12       PTT - ( 26 Jul 2018 07:05 )  PTT:30.0 SEC  CARDIAC MARKERS ( 25 Jul 2018 17:10 )  x     / x     / 109 u/L / 1.31 ng/mL / x        Microbiology:   [facility] 07-25 @ 16:30  pH: 7.41/pCO2: 38/pO2: 50/HCO3: 24/lactate: 1.0  [facility] 07-25 @ 15:20  pH: 7.39/pCO2: 40/pO2: 35/HCO3: 23/lactate: 1.3    Consultant(s) Notes Reviewed:  cards  Care Discussed with Consultants/Other Providers: cards fellow

## 2018-07-27 NOTE — CONSULT NOTE ADULT - ASSESSMENT
58 y.o. male with h/o Type 2 DM uncontrolled with complications including retinopathy, CKD and neuropathy, HTN, and hyperlipidemia with gout flare.

## 2018-07-27 NOTE — CONSULT NOTE ADULT - ASSESSMENT
58 year old male w/ acute gout attack right 1st MPJ and right plantar fasciitis.  - patient seen and evaluated bedside  - afebrile, no leukocytosis   - X-rays negative for acute fracture, dislocation , or cortical erosions. Gouty changes at first metatarsal head noted. Plantar spur noted.   - Recommend prednisone for gout attack since creatine is elevated.   - Recommend stretching exercises for plantar fasciitis  - Patient to follow up w/ Dr. Oliveira one week from discharge. Please call [phone]

## 2018-07-27 NOTE — CONSULT NOTE ADULT - PROBLEM SELECTOR PROBLEM 1
Uncontrolled type 2 diabetes mellitus with hypoglycemia without coma, with long-term current use of insulin

## 2018-07-27 NOTE — PROGRESS NOTE ADULT - PROBLEM SELECTOR PLAN 1
Ruling out sepsis as meeting SIRs criteria due to fever >102, tachycardia to 101, and leukocytosis 12; thus far no dinah localizing symptoms aside from cough (negative CT chest, RVP).  Blood cx NG x 24 hours.  No urinary symptoms.  No abdominal pain or diarrhea.  No skin lesions.  Has foot and finger pain not classic for gout however per patient this has happened before.  Although RVP negative sx most consistent with viral syndrome vs gout flare  - hold further abx, no recurrence of fevers, WBC downtrending  - f/u procal  - continue to f/u blood cultures   - Tylenol prn for fevers  - guaifenesin for cough

## 2018-07-27 NOTE — CONSULT NOTE ADULT - SUBJECTIVE AND OBJECTIVE BOX
HPI:  58 male with PMH CAD s/p stents (LAD/D1 7512-2732; 2 stent 6/2017 at [place]) on ASA/brillinta, HFrEF with EF 45% EF (05/2017, s/p cardiomems), T2DM on insulin c/b retinopathy and neuropathy, HTN, CKD stage 3 (baseline ~ 1.5-2), and gait ataxia unclear etiology who presented for chest pain  for past 2 days. Per patient, he reports sharp right sided chest pain radiating to the neck worse with inspiration and movement. He also endorses intermittent productive cough for the past 2 weeks, but feels he is unable to expectorate. He denies any sick contacts or recent travel. He started experiencing chills at home as well, but no recorded fever. Despite taking all his heart failure medications, he has noted increased abdominal and lower extremity swelling. In the right foot, he has pain over the heel which is worsened by weight bearing; but no trauma, injury or skin changes. He stated that his neuropathic pain in his feet are usually on the dorsal aspect and his last gout flare was in both ankles. He otherwise denies headache, abdominal pain, dysuria, diarrhea/constipation. Of note also, patient is following neurology as outpatient regarding gait ataxia (ie bumping into wall?) but no favoring of side that has been on going for the past year, being followed by neurology and pending outpatient MRI on Sunday 7/29 for further workup.     In ED T 102,  (sinus) /74. EKG no STT changes, troponin elevate but downtrended. CBC with leukocytosis, Hb 11 (baseline 9.8s). Cr 2.0 (baseline 1.6s). Patient given 2L IVF, clindamycin x 1, tylenol x 1, morphine 4 mg x 1, and diazepam 2 mg x 1 (25 Jul 2018 23:32)    Endocrine History    Patient is a 58 y.o. male diagnosed with Type 2 DM about 30 years and is insulin requiring for about 5 years and does follow with an endocrinologist, Dr. Velazquez. Patient at home SMBG 3 times per day with variable blood glucose readings. At home takes Levemir 60 units QHS and Novolog 12 units QAC. Patient does have complications  including retinopathy and neuropathy. Currently c/o right great toe and right heel pain. Patient reports decreased p.o. intake and nausea now. No abdominal pain. No SOB or CP now. Had hypoglycemia yesterday of 61 without symptoms and reports decrease in p.o. intake. Seen by rheumatology and podiatry and started on Prednisone 10 mg daily for 4 days.     PAST MEDICAL & SURGICAL HISTORY:  BPH (benign prostatic hyperplasia)  CAD (coronary artery disease)  Myocardial infarction  Pulmonary hypertension  ASHD (arteriosclerotic heart disease)  Ischemic cardiomyopathy  CKD (chronic kidney disease)  Hepatitis  Vitamin D deficiency  Nephrolithiasis  GERD (gastroesophageal reflux disease)  Diabetic retinopathy  Gout  HTN (hypertension)  s/p Angioplasty with Stent  Dyslipidemia  Diabetes Mellitus Type II, Uncontrolled  Stented coronary artery  CHF with cardiomyopathy: Insertion of Cardiomems monitor  Diabetes with retinopathy: S/P PPV/PRP/GAS  OD 2/22/17 for viterous hemorrhage  H/O lithotripsy  History of eye surgery: left eye  S/P coronary artery stent placement: 2010 TRICIA to Diag ; 11/18/2010  LAD; 2/4/11 ATOM liberte Diag; 5/15/2017  TRICIA to 1st diag; 6/7/17 PCI with laser and high pressure balloon  Retinal Hemorrhage      FAMILY HISTORY:  Family history of cardiac disorder in father (Father)  Mother - Type 2 DM    Social History: No smoking and no ETOH use    Outpatient Medications:  Levemir 60 units QHS and Novolog 12 units QAC    MEDICATIONS  (STANDING):  aspirin enteric coated 81 milliGRAM(s) Oral daily  atorvastatin 80 milliGRAM(s) Oral at bedtime  carvedilol 12.5 milliGRAM(s) Oral every 12 hours  dextrose 50% Injectable 12.5 Gram(s) IV Push once  dextrose 50% Injectable 25 Gram(s) IV Push once  dextrose 50% Injectable 25 Gram(s) IV Push once  furosemide    Tablet 40 milliGRAM(s) Oral daily  gabapentin 300 milliGRAM(s) Oral three times a day  guaiFENesin  milliGRAM(s) Oral every 12 hours  hydrALAZINE 75 milliGRAM(s) Oral three times a day  insulin glargine Injectable (LANTUS) 40 Unit(s) SubCutaneous at bedtime  insulin lispro (HumaLOG) corrective regimen sliding scale   SubCutaneous three times a day before meals  insulin lispro (HumaLOG) corrective regimen sliding scale   SubCutaneous at bedtime  insulin lispro Injectable (HumaLOG) 10 Unit(s) SubCutaneous three times a day before meals  isosorbide   dinitrate Tablet (ISORDIL) 20 milliGRAM(s) Oral three times a day  loratadine 10 milliGRAM(s) Oral daily  pantoprazole    Tablet 40 milliGRAM(s) Oral before breakfast  predniSONE   Tablet 10 milliGRAM(s) Oral daily  tamsulosin 0.4 milliGRAM(s) Oral at bedtime  ticagrelor 90 milliGRAM(s) Oral two times a day    MEDICATIONS  (PRN):  acetaminophen   Tablet 650 milliGRAM(s) Oral every 6 hours PRN For Temp greater than 38 C (100.4 F)  dextrose 40% Gel 15 Gram(s) Oral once PRN Blood Glucose LESS THAN 70 milliGRAM(s)/deciliter  glucagon  Injectable 1 milliGRAM(s) IntraMuscular once PRN Glucose LESS THAN 70 milligrams/deciliter  ondansetron Injectable 4 milliGRAM(s) IV Push every 4 hours PRN Nausea and/or Vomiting  traMADol 50 milliGRAM(s) Oral two times a day PRN Moderate Pain (4 - 6) and severe (7-10)      Allergies    No Known Allergies    Intolerances      Review of Systems:  Constitutional: No fever  Eyes: No blurry vision  Neuro: No tremors  HEENT: No pain  Cardiovascular: No chest pain, palpitations  Respiratory: No SOB, no cough  GI: Reports nausea and vomiting but no abdominal pain  : No dysuria  Skin: no rash  Psych: no depression  Endocrine: no polyuria, polydipsia  Hem/lymph: no swelling      PHYSICAL EXAM:  VITALS: T(C): 36.8 (07-27-18 @ 13:44)  T(F): 98.3 (07-27-18 @ 13:44), Max: 98.5 (07-26-18 @ 21:54)  HR: 96 (07-27-18 @ 18:15) (80 - 96)  BP: 151/82 (07-27-18 @ 18:15) (116/55 - 151/82)  RR:  (18 - 18)  SpO2:  (96% - 98%)  Wt(kg): --  GENERAL: NAD, well-groomed, well-developed  EYES: No proptosis, no lid lag, anicteric  HEENT:  Atraumatic, Normocephalic, moist mucous membranes  THYROID: Normal size, no palpable nodules  RESPIRATORY: Clear to auscultation bilaterally; No rales, rhonchi, wheezing  CARDIOVASCULAR: Regular rate and rhythm; no peripheral edema  GI: Soft, nontender, non distended, normal bowel sounds  SKIN: Dry, intact, No rashes or lesions  PSYCH: Alert and oriented x 3, normal affect, normal mood    POCT Blood Glucose.: 215 mg/dL (07-27-18 @ 17:06)  POCT Blood Glucose.: 192 mg/dL (07-27-18 @ 13:05)  POCT Blood Glucose.: 185 mg/dL (07-27-18 @ 11:52)  POCT Blood Glucose.: 206 mg/dL (07-27-18 @ 08:44)  POCT Blood Glucose.: 115 mg/dL (07-26-18 @ 23:08)  POCT Blood Glucose.: 98 mg/dL (07-26-18 @ 22:24)  POCT Blood Glucose.: 61 mg/dL (07-26-18 @ 21:22)  POCT Blood Glucose.: 108 mg/dL (07-26-18 @ 17:05)  POCT Blood Glucose.: 216 mg/dL (07-26-18 @ 12:22)  POCT Blood Glucose.: 233 mg/dL (07-26-18 @ 09:22)  POCT Blood Glucose.: 300 mg/dL (07-25-18 @ 21:15)  POCT Blood Glucose.: 356 mg/dL (07-25-18 @ 13:22)                            8.9    10.28 )-----------( 213      ( 27 Jul 2018 06:00 )             26.5       07-27    136  |  97<L>  |  40<H>  ----------------------------<  181<H>  4.1   |  23  |  2.08<H>    EGFR if : 39  EGFR if non : 34    Ca    8.7      07-27  Mg     2.2     07-26  Phos  3.7     07-26    TPro  7.9  /  Alb  4.1  /  TBili  0.4  /  DBili  x   /  AST  12  /  ALT  9   /  AlkPhos  92  07-25      Thyroid Function Tests:      Hemoglobin A1C, Whole Blood: 8.1 % <H> [4.0 - 5.6] (06-10-18 @ 07:10)          Radiology:

## 2018-07-27 NOTE — CONSULT NOTE ADULT - SUBJECTIVE AND OBJECTIVE BOX
HISTORY OF PRESENT ILLNESS:    PAST MEDICAL & SURGICAL HISTORY:  BPH (benign prostatic hyperplasia)  CAD (coronary artery disease)  Myocardial infarction  Pulmonary hypertension  ASHD (arteriosclerotic heart disease)  Ischemic cardiomyopathy  CKD (chronic kidney disease)  Hepatitis  Vitamin D deficiency  Nephrolithiasis  GERD (gastroesophageal reflux disease)  Diabetic retinopathy  Gout  HTN (hypertension)  s/p Angioplasty with Stent  Dyslipidemia  Diabetes Mellitus Type II, Uncontrolled  Stented coronary artery  CHF with cardiomyopathy: Insertion of Cardiomems monitor  Diabetes with retinopathy: S/P PPV/PRP/GAS  OD 17 for viterous hemorrhage  H/O lithotripsy  History of eye surgery: left eye  S/P coronary artery stent placement:  TRICIA to Diag ; 2010  LAD; 11 ATOM liberte Diag; 5/15/2017  TRICIA to  diag; 17 PCI with laser and high pressure balloon  Retinal Hemorrhage      REVIEW OF SYSTEMS      General:	    Skin/Breast:  	  Ophthalmologic:  	  ENMT:	    Respiratory and Thorax:  	  Cardiovascular:	    Gastrointestinal:	    Genitourinary:	    Musculoskeletal:	    Neurological:	    Psychiatric:	    Hematology/Lymphatics:	    Endocrine:	    Allergic/Immunologic:	    MEDICATIONS  (STANDING):  allopurinol 300 milliGRAM(s) Oral daily  aspirin enteric coated 81 milliGRAM(s) Oral daily  atorvastatin 80 milliGRAM(s) Oral at bedtime  carvedilol 12.5 milliGRAM(s) Oral every 12 hours  dextrose 50% Injectable 12.5 Gram(s) IV Push once  dextrose 50% Injectable 25 Gram(s) IV Push once  dextrose 50% Injectable 25 Gram(s) IV Push once  furosemide    Tablet 40 milliGRAM(s) Oral daily  gabapentin 300 milliGRAM(s) Oral three times a day  guaiFENesin  milliGRAM(s) Oral every 12 hours  hydrALAZINE 75 milliGRAM(s) Oral three times a day  insulin glargine Injectable (LANTUS) 40 Unit(s) SubCutaneous at bedtime  insulin lispro (HumaLOG) corrective regimen sliding scale   SubCutaneous three times a day before meals  insulin lispro (HumaLOG) corrective regimen sliding scale   SubCutaneous at bedtime  insulin lispro Injectable (HumaLOG) 10 Unit(s) SubCutaneous three times a day before meals  isosorbide   dinitrate Tablet (ISORDIL) 20 milliGRAM(s) Oral three times a day  loratadine 10 milliGRAM(s) Oral daily  pantoprazole    Tablet 40 milliGRAM(s) Oral before breakfast  tamsulosin 0.4 milliGRAM(s) Oral at bedtime  ticagrelor 90 milliGRAM(s) Oral two times a day    MEDICATIONS  (PRN):  acetaminophen   Tablet 650 milliGRAM(s) Oral every 6 hours PRN For Temp greater than 38 C (100.4 F)  dextrose 40% Gel 15 Gram(s) Oral once PRN Blood Glucose LESS THAN 70 milliGRAM(s)/deciliter  glucagon  Injectable 1 milliGRAM(s) IntraMuscular once PRN Glucose LESS THAN 70 milligrams/deciliter  ondansetron Injectable 4 milliGRAM(s) IV Push every 4 hours PRN Nausea and/or Vomiting  traMADol 50 milliGRAM(s) Oral two times a day PRN Moderate Pain (4 - 6) and severe (7-10)      Allergies    No Known Allergies    Intolerances        PERTINENT MEDICATION HISTORY:    SOCIAL HISTORY:    FAMILY HISTORY:  Family history of cardiac disorder in father (Father)      Vital Signs Last 24 Hrs  T(C): 36.8 (2018 11:47), Max: 38 (2018 15:10)  T(F): 98.2 (2018 11:47), Max: 100.4 (2018 15:10)  HR: 82 (2018 11:47) (80 - 95)  BP: 135/74 (2018 11:47) (116/55 - 160/73)  BP(mean): --  RR: 18 (2018 11:47) (15 - 18)  SpO2: 96% (2018 11:47) (96% - 100%)    Daily     Daily Weight in k.8 (2018 05:46)    PHYSICAL EXAM:      Constitutional:    Eyes:    ENMT:    Neck:    Breasts:    Back:    Respiratory:    Cardiovascular:    Gastrointestinal:    Genitourinary:    Rectal:    Extremities:    Vascular:    Neurological:    Skin:    Lymph Nodes:    Musculoskeletal:    Psychiatric:        LABS:                        8.9    10.28 )-----------( 213      ( 2018 06:00 )             26.5     07-27    136  |  97<L>  |  40<H>  ----------------------------<  181<H>  4.1   |  23  |  2.08<H>    Ca    8.7      2018 06:00  Phos  3.7     07-  Mg     2.2     -    TPro  7.9  /  Alb  4.1  /  TBili  0.4  /  DBili  x   /  AST  12  /  ALT  9   /  AlkPhos  92  07-25    PT/INR - ( 2018 07:05 )   PT: 12.5 SEC;   INR: 1.12          PTT - ( 2018 07:05 )  PTT:30.0 SEC      RADIOLOGY & ADDITIONAL STUDIES: HISTORY OF PRESENT ILLNESS:  58 yom known to our rheumatology practice (see notes in allscripts)  has chronic gout, OA and hyperuricemia   Had been on allopurinol 400mg qd, colchicine daily   Came to [facility] ED for chest pain but also with R plantar foot pain and mild increase of his chronic L 3rd PIP pain.  He was using a cane to walk due to the foot pain.  Prior gout attacks however have been associated with far greater swelling and warmth.  now pain is plantar and some mid foot, lateral ankle as well.  The patient has been already given colchicine in increased dose from outpatient, feels well and has been able to walk without the cane.  He has known chronic enlargement of that same PIP.  That pain has as well resolved.  Chest pain resolved as well and has been evaluated.      PAST MEDICAL & SURGICAL HISTORY:  BPH (benign prostatic hyperplasia)  CAD (coronary artery disease)  Myocardial infarction  Pulmonary hypertension  ASHD (arteriosclerotic heart disease)  Ischemic cardiomyopathy  CKD (chronic kidney disease)  Hepatitis  Vitamin D deficiency  Nephrolithiasis  GERD (gastroesophageal reflux disease)  Diabetic retinopathy  Gout  HTN (hypertension)  s/p Angioplasty with Stent  Dyslipidemia  Diabetes Mellitus Type II, Uncontrolled  Stented coronary artery  CHF with cardiomyopathy: Insertion of Cardiomems monitor  Diabetes with retinopathy: S/P PPV/PRP/GAS  OD 17 for viterous hemorrhage  H/O lithotripsy  History of eye surgery: left eye  S/P coronary artery stent placement:  TRICIA to Diag ; 2010  LAD; 11 ATOM liberte Diag; 5/15/2017  TRICIA to  diag; 17 PCI with laser and high pressure balloon  Retinal Hemorrhage      REVIEW OF SYSTEMS  as per HPI    MEDICATIONS  (STANDING):  allopurinol 300 milliGRAM(s) Oral daily  aspirin enteric coated 81 milliGRAM(s) Oral daily  atorvastatin 80 milliGRAM(s) Oral at bedtime  carvedilol 12.5 milliGRAM(s) Oral every 12 hours  dextrose 50% Injectable 12.5 Gram(s) IV Push once  dextrose 50% Injectable 25 Gram(s) IV Push once  dextrose 50% Injectable 25 Gram(s) IV Push once  furosemide    Tablet 40 milliGRAM(s) Oral daily  gabapentin 300 milliGRAM(s) Oral three times a day  guaiFENesin  milliGRAM(s) Oral every 12 hours  hydrALAZINE 75 milliGRAM(s) Oral three times a day  insulin glargine Injectable (LANTUS) 40 Unit(s) SubCutaneous at bedtime  insulin lispro (HumaLOG) corrective regimen sliding scale   SubCutaneous three times a day before meals  insulin lispro (HumaLOG) corrective regimen sliding scale   SubCutaneous at bedtime  insulin lispro Injectable (HumaLOG) 10 Unit(s) SubCutaneous three times a day before meals  isosorbide   dinitrate Tablet (ISORDIL) 20 milliGRAM(s) Oral three times a day  loratadine 10 milliGRAM(s) Oral daily  pantoprazole    Tablet 40 milliGRAM(s) Oral before breakfast  tamsulosin 0.4 milliGRAM(s) Oral at bedtime  ticagrelor 90 milliGRAM(s) Oral two times a day    MEDICATIONS  (PRN):  acetaminophen   Tablet 650 milliGRAM(s) Oral every 6 hours PRN For Temp greater than 38 C (100.4 F)  dextrose 40% Gel 15 Gram(s) Oral once PRN Blood Glucose LESS THAN 70 milliGRAM(s)/deciliter  glucagon  Injectable 1 milliGRAM(s) IntraMuscular once PRN Glucose LESS THAN 70 milligrams/deciliter  ondansetron Injectable 4 milliGRAM(s) IV Push every 4 hours PRN Nausea and/or Vomiting  traMADol 50 milliGRAM(s) Oral two times a day PRN Moderate Pain (4 - 6) and severe (7-10)      Allergies    No Known Allergies    Intolerances        PERTINENT MEDICATION HISTORY:    SOCIAL HISTORY:    FAMILY HISTORY:  Family history of cardiac disorder in father (Father)      Vital Signs Last 24 Hrs  T(C): 36.8 (2018 11:47), Max: 38 (2018 15:10)  T(F): 98.2 (2018 11:47), Max: 100.4 (2018 15:10)  HR: 82 (2018 11:47) (80 - 95)  BP: 135/74 (2018 11:47) (116/55 - 160/73)  BP(mean): --  RR: 18 (2018 11:47) (15 - 18)  SpO2: 96% (2018 11:47) (96% - 100%)    Daily     Daily Weight in k.8 (2018 05:46)    PHYSICAL EXAM:      Constitutional: a/o x 3     Eyes:    ENMT:    Neck:    Breasts:    Back:    Respiratory: wnl    Cardiovascular: wnl    Gastrointestinal:    Genitourinary:    Rectal:    Extremities: no edema     Vascular:    Neurological:    Skin:  no rash  Lymph Nodes:    Musculoskeletal:  mild distended joint capsule L 3rd PIP no tophi   no tenderness or warmth  R foot - plantar surface tender to palpation  mild tenderness posterior lateral ankle mild swelling     Psychiatric:        LABS:                        8.9    10.28 )-----------( 213      ( 2018 06:00 )             26.5     07    136  |  97<L>  |  40<H>  ----------------------------<  181<H>  4.1   |  23  |  2.08<H>    Ca    8.7      2018 06:00  Phos  3.7       Mg     2.2         TPro  7.9  /  Alb  4.1  /  TBili  0.4  /  DBili  x   /  AST  12  /  ALT  9   /  AlkPhos  92  25    PT/INR - ( 2018 07:05 )   PT: 12.5 SEC;   INR: 1.12          PTT - ( 2018 07:05 )  PTT:30.0 SEC      RADIOLOGY & ADDITIONAL STUDIES: HISTORY OF PRESENT ILLNESS:  58 yom known to our rheumatology practice (see notes in allscripts)  has chronic gout, OA and hyperuricemia   Had been on allopurinol 400mg qd, colchicine daily   Was lost to follow up for unclear reasons, plan had been to continue to raise dose of allopurinol  He however has only been getting 300mg here.    Came to [facility] ED for chest pain but also with R plantar foot pain and mild increase of his chronic L 3rd PIP pain.  He was using a cane to walk due to the foot pain.  Prior gout attacks however have been associated with far greater swelling and warmth.  now pain is plantar and some mid foot, lateral ankle as well.  The patient has been already given colchicine in increased dose from outpatient, feels well and has been able to walk without the cane.  He has known chronic enlargement of that same PIP.  That pain has as well resolved.  Chest pain resolved as well and has been evaluated.      PAST MEDICAL & SURGICAL HISTORY:  BPH (benign prostatic hyperplasia)  CAD (coronary artery disease)  Myocardial infarction  Pulmonary hypertension  ASHD (arteriosclerotic heart disease)  Ischemic cardiomyopathy  CKD (chronic kidney disease)  Hepatitis  Vitamin D deficiency  Nephrolithiasis  GERD (gastroesophageal reflux disease)  Diabetic retinopathy  Gout  HTN (hypertension)  s/p Angioplasty with Stent  Dyslipidemia  Diabetes Mellitus Type II, Uncontrolled  Stented coronary artery  CHF with cardiomyopathy: Insertion of Cardiomems monitor  Diabetes with retinopathy: S/P PPV/PRP/GAS  OD 17 for viterous hemorrhage  H/O lithotripsy  History of eye surgery: left eye  S/P coronary artery stent placement:  TRICIA to Diag ; 2010  LAD; 11 ATOM liberte Diag; 5/15/2017  TRICIA to  diag; 17 PCI with laser and high pressure balloon  Retinal Hemorrhage      REVIEW OF SYSTEMS  as per HPI    MEDICATIONS  (STANDING):  allopurinol 300 milliGRAM(s) Oral daily  aspirin enteric coated 81 milliGRAM(s) Oral daily  atorvastatin 80 milliGRAM(s) Oral at bedtime  carvedilol 12.5 milliGRAM(s) Oral every 12 hours  dextrose 50% Injectable 12.5 Gram(s) IV Push once  dextrose 50% Injectable 25 Gram(s) IV Push once  dextrose 50% Injectable 25 Gram(s) IV Push once  furosemide    Tablet 40 milliGRAM(s) Oral daily  gabapentin 300 milliGRAM(s) Oral three times a day  guaiFENesin  milliGRAM(s) Oral every 12 hours  hydrALAZINE 75 milliGRAM(s) Oral three times a day  insulin glargine Injectable (LANTUS) 40 Unit(s) SubCutaneous at bedtime  insulin lispro (HumaLOG) corrective regimen sliding scale   SubCutaneous three times a day before meals  insulin lispro (HumaLOG) corrective regimen sliding scale   SubCutaneous at bedtime  insulin lispro Injectable (HumaLOG) 10 Unit(s) SubCutaneous three times a day before meals  isosorbide   dinitrate Tablet (ISORDIL) 20 milliGRAM(s) Oral three times a day  loratadine 10 milliGRAM(s) Oral daily  pantoprazole    Tablet 40 milliGRAM(s) Oral before breakfast  tamsulosin 0.4 milliGRAM(s) Oral at bedtime  ticagrelor 90 milliGRAM(s) Oral two times a day    MEDICATIONS  (PRN):  acetaminophen   Tablet 650 milliGRAM(s) Oral every 6 hours PRN For Temp greater than 38 C (100.4 F)  dextrose 40% Gel 15 Gram(s) Oral once PRN Blood Glucose LESS THAN 70 milliGRAM(s)/deciliter  glucagon  Injectable 1 milliGRAM(s) IntraMuscular once PRN Glucose LESS THAN 70 milligrams/deciliter  ondansetron Injectable 4 milliGRAM(s) IV Push every 4 hours PRN Nausea and/or Vomiting  traMADol 50 milliGRAM(s) Oral two times a day PRN Moderate Pain (4 - 6) and severe (7-10)      Allergies    No Known Allergies    Intolerances        PERTINENT MEDICATION HISTORY:    SOCIAL HISTORY:    FAMILY HISTORY:  Family history of cardiac disorder in father (Father)      Vital Signs Last 24 Hrs  T(C): 36.8 (2018 11:47), Max: 38 (2018 15:10)  T(F): 98.2 (2018 11:47), Max: 100.4 (2018 15:10)  HR: 82 (2018 11:47) (80 - 95)  BP: 135/74 (2018 11:47) (116/55 - 160/73)  BP(mean): --  RR: 18 (2018 11:47) (15 - 18)  SpO2: 96% (2018 11:47) (96% - 100%)    Daily     Daily Weight in k.8 (2018 05:46)    PHYSICAL EXAM:      Constitutional: a/o x 3     Eyes:    ENMT:    Neck:    Breasts:    Back:    Respiratory: wnl    Cardiovascular: wnl    Gastrointestinal:    Genitourinary:    Rectal:    Extremities: no edema     Vascular:    Neurological:    Skin:  no rash  Lymph Nodes:    Musculoskeletal:  mild distended joint capsule L 3rd PIP no tophi   no tenderness or warmth  R foot - plantar surface tender to palpation  mild tenderness posterior lateral ankle mild swelling     Psychiatric:        LABS:                        8.9    10.28 )-----------( 213      ( 2018 06:00 )             26.5     07-27    136  |  97<L>  |  40<H>  ----------------------------<  181<H>  4.1   |  23  |  2.08<H>    Ca    8.7      2018 06:00  Phos  3.7     07-26  Mg     2.2     07-26    TPro  7.9  /  Alb  4.1  /  TBili  0.4  /  DBili  x   /  AST  12  /  ALT  9   /  AlkPhos  92  07-25    PT/INR - ( 2018 07:05 )   PT: 12.5 SEC;   INR: 1.12          PTT - ( 2018 07:05 )  PTT:30.0 SEC      RADIOLOGY & ADDITIONAL STUDIES:

## 2018-07-27 NOTE — CONSULT NOTE ADULT - ASSESSMENT
Acute on chronic joint pain, arthralgias, hyperuricemia, CKD Acute on chronic joint pain, arthralgias, hyperuricemia, CKD  Very mild sx of finger, ankle, foot pain persists now, and the foot pain seems more c/w plantar fasciitis than gout.  He has a rheumatologist in our group - please continue his allopurinol 400mg qd and colchicine 0.6mg every other day    Can give low dose prednisone 10mg po qd x 4 days and then stop for this mild pain    Patient is aware and has been instructed to follow up with Dr. Christensen in 2-3 wks.  He has the number and will call when he gets home. Acute on chronic joint pain, arthralgias, hyperuricemia, CKD  Very mild sx of finger, ankle, foot pain persists now, and the foot pain seems more c/w plantar fasciitis than gout.  no obvious trigger for the flare   has been compliant with Rx.  no change in renal status - seems the joint pain is more chronic than acute and he brought it to MD attention since he hasn't seen rheum MD in months, however, He has a rheumatologist in our group - please continue his allopurinol 400mg qd and colchicine 0.6mg every other day    Can give low dose prednisone 10mg po qd x 4 days and then stop for this mild pain    Patient is aware and has been instructed to follow up with Dr. Christensen in 2-3 wks.  He has the number and will call when he gets home.

## 2018-07-27 NOTE — PROGRESS NOTE ADULT - PROBLEM SELECTOR PLAN 4
Patient with recurrent flares, last flare in bilateral ankles in June 2018. Currently with severe pain in midfoot plantar aspect. Lasix places him at risk however location on foot appear not classic with such as not at a joint.  Middle finger of L hand appears to warms, reports chronically has swollen joint there  - c/w allopurinol  - c/w colchicine   - Tylenol prn for pain  - f/u rheum re: management, ?steroids--also with so many flares and UA >10

## 2018-07-27 NOTE — PROGRESS NOTE ADULT - PROBLEM SELECTOR PLAN 7
Last HbA1c 8.1. On Lantus 60 mg at bedtime and Humalog 12 units premeal.  Suboptimal control with CKD, neuropathy, and retinopathy  - h/o mutiple hypos  - endo c/s  - c/w FSG and ISS qAC and qhs  - DASH TLC consistent carb diet

## 2018-07-27 NOTE — CONSULT NOTE ADULT - PROBLEM SELECTOR RECOMMENDATION 9
- Reviewed blood glucose and Hba1c goals  - Target blood glucose inpatient is 100 to 180 mg/dl  - Agree with blood glucose monitoring QAC and QHS  - Agree with decrease of Lantus to 40 units QAH; however hypoglycemia is most likely related to decreased p.o. intake.   - Will continue Humalog 10 units QAC with correction scale for now especially since patient has been started on Prednisone.   - Will follow closely and plan for discharge on basal bolus regimen with doses to be determined. Will follow up with his outpatient endocrinologist

## 2018-07-27 NOTE — CONSULT NOTE ADULT - ATTENDING COMMENTS
note written by rheumatology attending note written by rheumatology attending    recall as need if pain increases and or he has true joint swelling

## 2018-07-27 NOTE — CONSULT NOTE ADULT - SUBJECTIVE AND OBJECTIVE BOX
Podiatry pager #: [phone]    Patient is a 58y old  Male who presents with a chief complaint of sharp chest pain x 2 days (25 Jul 2018 23:32)      HPI:  58 M complains of right great toe pain and plantar heel pain. Pt has a history of several gout attacks. Pt takes allopurinol daily to control his gout attacks. Pt received a dose of colchcine since admitted and states it has helped reduced his symptoms. Pt also complain of heel pain that is the worst in the morning with the first step. Pt describes the heel pain as sharp. Pt denies any history of trauma.     In ED T 102,  (sinus) /74. EKG no STT changes, troponin elevate but downtrended. CBC with leukocytosis, Hb 11 (baseline 9.8s). Cr 2.0 (baseline 1.6s). Patient given 2L IVF, clindamycin x 1, tylenol x 1, morphine 4 mg x 1, and diazepam 2 mg x 1 (25 Jul 2018 23:32)      PAST MEDICAL & SURGICAL HISTORY:  BPH (benign prostatic hyperplasia)  CAD (coronary artery disease)  Myocardial infarction  Pulmonary hypertension  ASHD (arteriosclerotic heart disease)  Ischemic cardiomyopathy  CKD (chronic kidney disease)  Hepatitis  Vitamin D deficiency  Nephrolithiasis  GERD (gastroesophageal reflux disease)  Diabetic retinopathy  Gout  HTN (hypertension)  s/p Angioplasty with Stent  Dyslipidemia  Diabetes Mellitus Type II, Uncontrolled  Stented coronary artery  CHF with cardiomyopathy: Insertion of Cardiomems monitor  Diabetes with retinopathy: S/P PPV/PRP/GAS  OD 2/22/17 for viterous hemorrhage  H/O lithotripsy  History of eye surgery: left eye  S/P coronary artery stent placement: 2010 TRICIA to Diag ; 11/18/2010  LAD; 2/4/11 ATOM liberte Diag; 5/15/2017  TRICIA to 1st diag; 6/7/17 PCI with laser and high pressure balloon  Retinal Hemorrhage      MEDICATIONS  (STANDING):  aspirin enteric coated 81 milliGRAM(s) Oral daily  atorvastatin 80 milliGRAM(s) Oral at bedtime  carvedilol 12.5 milliGRAM(s) Oral every 12 hours  dextrose 50% Injectable 12.5 Gram(s) IV Push once  dextrose 50% Injectable 25 Gram(s) IV Push once  dextrose 50% Injectable 25 Gram(s) IV Push once  furosemide    Tablet 40 milliGRAM(s) Oral daily  gabapentin 300 milliGRAM(s) Oral three times a day  guaiFENesin  milliGRAM(s) Oral every 12 hours  hydrALAZINE 75 milliGRAM(s) Oral three times a day  insulin glargine Injectable (LANTUS) 40 Unit(s) SubCutaneous at bedtime  insulin lispro (HumaLOG) corrective regimen sliding scale   SubCutaneous three times a day before meals  insulin lispro (HumaLOG) corrective regimen sliding scale   SubCutaneous at bedtime  insulin lispro Injectable (HumaLOG) 10 Unit(s) SubCutaneous three times a day before meals  isosorbide   dinitrate Tablet (ISORDIL) 20 milliGRAM(s) Oral three times a day  loratadine 10 milliGRAM(s) Oral daily  pantoprazole    Tablet 40 milliGRAM(s) Oral before breakfast  predniSONE   Tablet 10 milliGRAM(s) Oral daily  tamsulosin 0.4 milliGRAM(s) Oral at bedtime  ticagrelor 90 milliGRAM(s) Oral two times a day    MEDICATIONS  (PRN):  acetaminophen   Tablet 650 milliGRAM(s) Oral every 6 hours PRN For Temp greater than 38 C (100.4 F)  dextrose 40% Gel 15 Gram(s) Oral once PRN Blood Glucose LESS THAN 70 milliGRAM(s)/deciliter  glucagon  Injectable 1 milliGRAM(s) IntraMuscular once PRN Glucose LESS THAN 70 milligrams/deciliter  ondansetron Injectable 4 milliGRAM(s) IV Push every 4 hours PRN Nausea and/or Vomiting  traMADol 50 milliGRAM(s) Oral two times a day PRN Moderate Pain (4 - 6) and severe (7-10)      Allergies    No Known Allergies    Intolerances        VITALS:    Vital Signs Last 24 Hrs  T(C): 36.8 (27 Jul 2018 13:44), Max: 37.3 (26 Jul 2018 16:57)  T(F): 98.3 (27 Jul 2018 13:44), Max: 99.1 (26 Jul 2018 16:57)  HR: 84 (27 Jul 2018 13:44) (80 - 95)  BP: 132/62 (27 Jul 2018 13:44) (116/55 - 135/74)  BP(mean): --  RR: 18 (27 Jul 2018 13:44) (18 - 18)  SpO2: 96% (27 Jul 2018 13:44) (96% - 98%)    LABS:                          8.9    10.28 )-----------( 213      ( 27 Jul 2018 06:00 )             26.5       07-27    136  |  97<L>  |  40<H>  ----------------------------<  181<H>  4.1   |  23  |  2.08<H>    Ca    8.7      27 Jul 2018 06:00  Phos  3.7     07-26  Mg     2.2     07-26    TPro  7.9  /  Alb  4.1  /  TBili  0.4  /  DBili  x   /  AST  12  /  ALT  9   /  AlkPhos  92  07-25      CAPILLARY BLOOD GLUCOSE      POCT Blood Glucose.: 192 mg/dL (27 Jul 2018 13:05)  POCT Blood Glucose.: 185 mg/dL (27 Jul 2018 11:52)  POCT Blood Glucose.: 206 mg/dL (27 Jul 2018 08:44)  POCT Blood Glucose.: 115 mg/dL (26 Jul 2018 23:08)  POCT Blood Glucose.: 98 mg/dL (26 Jul 2018 22:24)  POCT Blood Glucose.: 61 mg/dL (26 Jul 2018 21:22)  POCT Blood Glucose.: 108 mg/dL (26 Jul 2018 17:05)      PT/INR - ( 26 Jul 2018 07:05 )   PT: 12.5 SEC;   INR: 1.12          PTT - ( 26 Jul 2018 07:05 )  PTT:30.0 SEC    LOWER EXTREMITY PHYSICAL EXAM:    Vasular: L DP 2/4 R 0/4; PT 0/4  B/L, CFT <3 seconds B/L, Temperature gradient warm to warm bilat.   Neuro: Epicritic sensation  intact bilat.   Musculoskeletal/Ortho: right foot HAV, increased swelling and erythema surrounding the first MPJ. Pain with palpation of the medial calcaneal tubercle.       RADIOLOGY & ADDITIONAL STUDIES:    < from: Xray Ankle Complete 3 Views, Right (07.25.18 @ 17:17) >  EXAM:  RAD ANKLE MIN 3 VIEWS RIGHT      EXAM:  RAD FOOT MIN 3 VIEWS RIGHT        PROCEDURE DATE:  Jul 25 2018         INTERPRETATION:      CLINICAL INDICATION: Right foot and ankle pain. No history of trauma.    TECHNIQUE: 3 views of the RIGHT footand 2 views of the right ankle were   obtained.    COMPARISON: Right foot x-ray 6/10/2018, bilateral foot and ankle x-rays   4/12/2018.    FINDINGS:    No acute fracture. No dislocation. Joint space narrowing and degenerative   changes throughout themidfoot. First toe valgus deformity with   associated bunions. Calcaneal plantar enthesophyte is again noted. No   cortical erosions. No soft tissue swelling. Redemonstration of vascular   calcifications. No radiopaque foreign body.      IMPRESSION:  No acute fracture or dislocation. No cortical erosions.                  ARISTIDES CABRALES M.D., RADIOLGY RESIDENT    < end of copied text >

## 2018-07-27 NOTE — PROGRESS NOTE ADULT - SUBJECTIVE AND OBJECTIVE BOX
Overnight Events:   Still has slight R shoulder/chest pain. No SOB    Medications:  acetaminophen   Tablet 650 milliGRAM(s) Oral every 6 hours PRN  allopurinol 300 milliGRAM(s) Oral daily  aspirin enteric coated 81 milliGRAM(s) Oral daily  atorvastatin 80 milliGRAM(s) Oral at bedtime  carvedilol 12.5 milliGRAM(s) Oral every 12 hours  dextrose 40% Gel 15 Gram(s) Oral once PRN  dextrose 50% Injectable 12.5 Gram(s) IV Push once  dextrose 50% Injectable 25 Gram(s) IV Push once  dextrose 50% Injectable 25 Gram(s) IV Push once  furosemide    Tablet 40 milliGRAM(s) Oral daily  gabapentin 300 milliGRAM(s) Oral three times a day  glucagon  Injectable 1 milliGRAM(s) IntraMuscular once PRN  guaiFENesin  milliGRAM(s) Oral every 12 hours  hydrALAZINE 75 milliGRAM(s) Oral three times a day  insulin glargine Injectable (LANTUS) 40 Unit(s) SubCutaneous at bedtime  insulin lispro (HumaLOG) corrective regimen sliding scale   SubCutaneous three times a day before meals  insulin lispro (HumaLOG) corrective regimen sliding scale   SubCutaneous at bedtime  insulin lispro Injectable (HumaLOG) 10 Unit(s) SubCutaneous three times a day before meals  isosorbide   dinitrate Tablet (ISORDIL) 20 milliGRAM(s) Oral three times a day  loratadine 10 milliGRAM(s) Oral daily  ondansetron Injectable 4 milliGRAM(s) IV Push every 4 hours PRN  pantoprazole    Tablet 40 milliGRAM(s) Oral before breakfast  tamsulosin 0.4 milliGRAM(s) Oral at bedtime  ticagrelor 90 milliGRAM(s) Oral two times a day  traMADol 50 milliGRAM(s) Oral two times a day PRN      PAST MEDICAL & SURGICAL HISTORY:  BPH (benign prostatic hyperplasia)  CAD (coronary artery disease)  Myocardial infarction  Pulmonary hypertension  ASHD (arteriosclerotic heart disease)  Ischemic cardiomyopathy  CKD (chronic kidney disease)  Hepatitis  Vitamin D deficiency  Nephrolithiasis  GERD (gastroesophageal reflux disease)  Diabetic retinopathy  Gout  HTN (hypertension)  s/p Angioplasty with Stent  Dyslipidemia  Diabetes Mellitus Type II, Uncontrolled  Stented coronary artery  CHF with cardiomyopathy: Insertion of Cardiomems monitor  Diabetes with retinopathy: S/P PPV/PRP/GAS  OD 2/22/17 for viterous hemorrhage  H/O lithotripsy  History of eye surgery: left eye  S/P coronary artery stent placement: 2010 TRICIA to Diag ; 11/18/2010  LAD; 2/4/11 ATOM liberte Diag; 5/15/2017  TRICIA to 1st diag; 6/7/17 PCI with laser and high pressure balloon  Retinal Hemorrhage      Vitals:  T(F): 98.2 (07-27), Max: 100.4 (07-26)  HR: 82 (07-27) (80 - 95)  BP: 135/74 (07-27) (116/55 - 135/74)  RR: 18 (07-27)  SpO2: 96% (07-27)  I&O's Summary    26 Jul 2018 07:01  -  27 Jul 2018 07:00  --------------------------------------------------------  IN: 300 mL / OUT: 0 mL / NET: 300 mL    27 Jul 2018 07:01  -  27 Jul 2018 13:13  --------------------------------------------------------  IN: 200 mL / OUT: 0 mL / NET: 200 mL        Physical Exam:  Appearance: No acute distress  Eyes: PERRL, EOMI, pink conjunctiva  HENT: Normal oral muscosa  Cardiovascular: RRR, S1, S2, no murmurs, rubs, or gallops; no edema; no JVD  Respiratory: Clear to auscultation bilaterally  Gastrointestinal: soft, non-tender, non-distended with normal bowel sounds  Musculoskeletal: No clubbing; no joint deformity   Neurologic: Non-focal  Psychiatry: AAOx3, mood & affect appropriate  Skin: No rashes, ecchymoses, or cyanosis                          8.9    10.28 )-----------( 213      ( 27 Jul 2018 06:00 )             26.5     07-27    136  |  97<L>  |  40<H>  ----------------------------<  181<H>  4.1   |  23  |  2.08<H>    Ca    8.7      27 Jul 2018 06:00  Phos  3.7     07-26  Mg     2.2     07-26    TPro  7.9  /  Alb  4.1  /  TBili  0.4  /  DBili  x   /  AST  12  /  ALT  9   /  AlkPhos  92  07-25    PT/INR - ( 26 Jul 2018 07:05 )   PT: 12.5 SEC;   INR: 1.12          PTT - ( 26 Jul 2018 07:05 )  PTT:30.0 SEC  CARDIAC MARKERS ( 25 Jul 2018 17:10 )  x     / x     / 109 u/L / 1.31 ng/mL / x                    Interpretation of Telemetry: Sinus in 80's, no events

## 2018-07-28 ENCOUNTER — APPOINTMENT (OUTPATIENT)
Dept: MRI IMAGING | Facility: IMAGING CENTER | Age: 58
End: 2018-07-28
Payer: COMMERCIAL

## 2018-07-28 ENCOUNTER — OUTPATIENT (OUTPATIENT)
Dept: OUTPATIENT SERVICES | Facility: HOSPITAL | Age: 58
LOS: 1 days | End: 2018-07-28
Payer: COMMERCIAL

## 2018-07-28 ENCOUNTER — TRANSCRIPTION ENCOUNTER (OUTPATIENT)
Age: 58
End: 2018-07-28

## 2018-07-28 VITALS — WEIGHT: 252.21 LBS

## 2018-07-28 DIAGNOSIS — E11.319 TYPE 2 DIABETES MELLITUS WITH UNSPECIFIED DIABETIC RETINOPATHY WITHOUT MACULAR EDEMA: Chronic | ICD-10-CM

## 2018-07-28 DIAGNOSIS — G62.9 POLYNEUROPATHY, UNSPECIFIED: ICD-10-CM

## 2018-07-28 DIAGNOSIS — Z98.89 OTHER SPECIFIED POSTPROCEDURAL STATES: Chronic | ICD-10-CM

## 2018-07-28 DIAGNOSIS — Z95.5 PRESENCE OF CORONARY ANGIOPLASTY IMPLANT AND GRAFT: Chronic | ICD-10-CM

## 2018-07-28 DIAGNOSIS — I50.9 HEART FAILURE, UNSPECIFIED: Chronic | ICD-10-CM

## 2018-07-28 DIAGNOSIS — R26.89 OTHER ABNORMALITIES OF GAIT AND MOBILITY: ICD-10-CM

## 2018-07-28 DIAGNOSIS — Z00.8 ENCOUNTER FOR OTHER GENERAL EXAMINATION: ICD-10-CM

## 2018-07-28 LAB
BUN SERPL-MCNC: 52 MG/DL — HIGH (ref 7–23)
CALCIUM SERPL-MCNC: 9.1 MG/DL — SIGNIFICANT CHANGE UP (ref 8.4–10.5)
CHLORIDE SERPL-SCNC: 96 MMOL/L — LOW (ref 98–107)
CO2 SERPL-SCNC: 23 MMOL/L — SIGNIFICANT CHANGE UP (ref 22–31)
CREAT SERPL-MCNC: 2.11 MG/DL — HIGH (ref 0.5–1.3)
GLUCOSE SERPL-MCNC: 232 MG/DL — HIGH (ref 70–99)
HCT VFR BLD CALC: 27.1 % — LOW (ref 39–50)
HGB BLD-MCNC: 9 G/DL — LOW (ref 13–17)
MCHC RBC-ENTMCNC: 28.5 PG — SIGNIFICANT CHANGE UP (ref 27–34)
MCHC RBC-ENTMCNC: 33.2 % — SIGNIFICANT CHANGE UP (ref 32–36)
MCV RBC AUTO: 85.8 FL — SIGNIFICANT CHANGE UP (ref 80–100)
NRBC # FLD: 0 — SIGNIFICANT CHANGE UP
PLATELET # BLD AUTO: 249 K/UL — SIGNIFICANT CHANGE UP (ref 150–400)
PMV BLD: 10.1 FL — SIGNIFICANT CHANGE UP (ref 7–13)
POTASSIUM SERPL-MCNC: 4.1 MMOL/L — SIGNIFICANT CHANGE UP (ref 3.5–5.3)
POTASSIUM SERPL-SCNC: 4.1 MMOL/L — SIGNIFICANT CHANGE UP (ref 3.5–5.3)
RBC # BLD: 3.16 M/UL — LOW (ref 4.2–5.8)
RBC # FLD: 14.3 % — SIGNIFICANT CHANGE UP (ref 10.3–14.5)
SODIUM SERPL-SCNC: 135 MMOL/L — SIGNIFICANT CHANGE UP (ref 135–145)
WBC # BLD: 7.69 K/UL — SIGNIFICANT CHANGE UP (ref 3.8–10.5)
WBC # FLD AUTO: 7.69 K/UL — SIGNIFICANT CHANGE UP (ref 3.8–10.5)

## 2018-07-28 PROCEDURE — 72141 MRI NECK SPINE W/O DYE: CPT | Mod: 26

## 2018-07-28 PROCEDURE — 70551 MRI BRAIN STEM W/O DYE: CPT | Mod: 26

## 2018-07-28 PROCEDURE — 70551 MRI BRAIN STEM W/O DYE: CPT

## 2018-07-28 PROCEDURE — 99239 HOSP IP/OBS DSCHRG MGMT >30: CPT

## 2018-07-28 PROCEDURE — 72141 MRI NECK SPINE W/O DYE: CPT

## 2018-07-28 RX ORDER — CARVEDILOL PHOSPHATE 80 MG/1
1 CAPSULE, EXTENDED RELEASE ORAL
Qty: 0 | Refills: 0 | DISCHARGE
Start: 2018-07-28

## 2018-07-28 RX ORDER — PANTOPRAZOLE SODIUM 20 MG/1
1 TABLET, DELAYED RELEASE ORAL
Qty: 0 | Refills: 0 | COMMUNITY

## 2018-07-28 RX ORDER — TAMSULOSIN HYDROCHLORIDE 0.4 MG/1
1 CAPSULE ORAL
Qty: 0 | Refills: 0 | COMMUNITY

## 2018-07-28 RX ORDER — ASPIRIN/CALCIUM CARB/MAGNESIUM 324 MG
1 TABLET ORAL
Qty: 0 | Refills: 0 | DISCHARGE
Start: 2018-07-28

## 2018-07-28 RX ORDER — TAMSULOSIN HYDROCHLORIDE 0.4 MG/1
1 CAPSULE ORAL
Qty: 0 | Refills: 0 | DISCHARGE
Start: 2018-07-28

## 2018-07-28 RX ORDER — HYDRALAZINE HCL 50 MG
3 TABLET ORAL
Qty: 0 | Refills: 0 | COMMUNITY

## 2018-07-28 RX ORDER — ALLOPURINOL 300 MG
4 TABLET ORAL
Qty: 120 | Refills: 0
Start: 2018-07-28 | End: 2018-08-26

## 2018-07-28 RX ORDER — ASPIRIN/CALCIUM CARB/MAGNESIUM 324 MG
1 TABLET ORAL
Qty: 0 | Refills: 0 | COMMUNITY

## 2018-07-28 RX ORDER — GABAPENTIN 400 MG/1
1 CAPSULE ORAL
Qty: 0 | Refills: 0 | COMMUNITY

## 2018-07-28 RX ORDER — INSULIN GLARGINE 100 [IU]/ML
55 INJECTION, SOLUTION SUBCUTANEOUS AT BEDTIME
Qty: 0 | Refills: 0 | Status: DISCONTINUED | OUTPATIENT
Start: 2018-07-28 | End: 2018-07-28

## 2018-07-28 RX ORDER — ATORVASTATIN CALCIUM 80 MG/1
1 TABLET, FILM COATED ORAL
Qty: 0 | Refills: 0 | DISCHARGE
Start: 2018-07-28

## 2018-07-28 RX ORDER — FUROSEMIDE 40 MG
1 TABLET ORAL
Qty: 0 | Refills: 0 | COMMUNITY

## 2018-07-28 RX ORDER — TICAGRELOR 90 MG/1
1 TABLET ORAL
Qty: 0 | Refills: 0 | DISCHARGE
Start: 2018-07-28

## 2018-07-28 RX ORDER — INSULIN GLARGINE 100 [IU]/ML
50 INJECTION, SOLUTION SUBCUTANEOUS AT BEDTIME
Qty: 0 | Refills: 0 | Status: DISCONTINUED | OUTPATIENT
Start: 2018-07-28 | End: 2018-07-28

## 2018-07-28 RX ORDER — GABAPENTIN 400 MG/1
1 CAPSULE ORAL
Qty: 0 | Refills: 0 | DISCHARGE
Start: 2018-07-28

## 2018-07-28 RX ORDER — ISOSORBIDE DINITRATE 5 MG/1
0 TABLET ORAL
Qty: 0 | Refills: 0 | COMMUNITY

## 2018-07-28 RX ORDER — TICAGRELOR 90 MG/1
1 TABLET ORAL
Qty: 0 | Refills: 0 | COMMUNITY

## 2018-07-28 RX ORDER — ATORVASTATIN CALCIUM 80 MG/1
1 TABLET, FILM COATED ORAL
Qty: 0 | Refills: 0 | COMMUNITY

## 2018-07-28 RX ORDER — FUROSEMIDE 40 MG
1 TABLET ORAL
Qty: 0 | Refills: 0 | DISCHARGE
Start: 2018-07-28

## 2018-07-28 RX ORDER — INSULIN DETEMIR 100/ML (3)
55 INSULIN PEN (ML) SUBCUTANEOUS
Qty: 0 | Refills: 0 | COMMUNITY

## 2018-07-28 RX ORDER — HYDRALAZINE HCL 50 MG
3 TABLET ORAL
Qty: 0 | Refills: 0 | DISCHARGE
Start: 2018-07-28

## 2018-07-28 RX ORDER — PANTOPRAZOLE SODIUM 20 MG/1
1 TABLET, DELAYED RELEASE ORAL
Qty: 0 | Refills: 0 | DISCHARGE
Start: 2018-07-28

## 2018-07-28 RX ORDER — CARVEDILOL PHOSPHATE 80 MG/1
1 CAPSULE, EXTENDED RELEASE ORAL
Qty: 0 | Refills: 0 | COMMUNITY

## 2018-07-28 RX ORDER — INSULIN LISPRO 100/ML
12 VIAL (ML) SUBCUTANEOUS
Qty: 0 | Refills: 0 | COMMUNITY

## 2018-07-28 RX ORDER — ISOSORBIDE DINITRATE 5 MG/1
1 TABLET ORAL
Qty: 90 | Refills: 0
Start: 2018-07-28 | End: 2018-08-26

## 2018-07-28 RX ORDER — ERGOCALCIFEROL 1.25 MG/1
1 CAPSULE ORAL
Qty: 0 | Refills: 0 | COMMUNITY

## 2018-07-28 RX ORDER — COLCHICINE 0.6 MG
1 TABLET ORAL
Qty: 15 | Refills: 0
Start: 2018-07-28 | End: 2018-08-26

## 2018-07-28 RX ORDER — INSULIN DETEMIR 100/ML (3)
60 INSULIN PEN (ML) SUBCUTANEOUS
Qty: 0 | Refills: 0 | COMMUNITY

## 2018-07-28 RX ADMIN — PANTOPRAZOLE SODIUM 40 MILLIGRAM(S): 20 TABLET, DELAYED RELEASE ORAL at 06:40

## 2018-07-28 RX ADMIN — CARVEDILOL PHOSPHATE 12.5 MILLIGRAM(S): 80 CAPSULE, EXTENDED RELEASE ORAL at 06:40

## 2018-07-28 RX ADMIN — Medication 0.6 MILLIGRAM(S): at 12:15

## 2018-07-28 RX ADMIN — GABAPENTIN 300 MILLIGRAM(S): 400 CAPSULE ORAL at 06:39

## 2018-07-28 RX ADMIN — Medication 10 UNIT(S): at 08:52

## 2018-07-28 RX ADMIN — Medication 40 MILLIGRAM(S): at 06:40

## 2018-07-28 RX ADMIN — TICAGRELOR 90 MILLIGRAM(S): 90 TABLET ORAL at 06:40

## 2018-07-28 RX ADMIN — Medication 75 MILLIGRAM(S): at 06:40

## 2018-07-28 RX ADMIN — Medication 400 MILLIGRAM(S): at 12:15

## 2018-07-28 RX ADMIN — LORATADINE 10 MILLIGRAM(S): 10 TABLET ORAL at 12:15

## 2018-07-28 RX ADMIN — Medication 10 UNIT(S): at 12:15

## 2018-07-28 RX ADMIN — Medication 3: at 12:16

## 2018-07-28 RX ADMIN — Medication 3: at 08:52

## 2018-07-28 RX ADMIN — ISOSORBIDE DINITRATE 20 MILLIGRAM(S): 5 TABLET ORAL at 06:40

## 2018-07-28 RX ADMIN — Medication 600 MILLIGRAM(S): at 06:39

## 2018-07-28 RX ADMIN — Medication 81 MILLIGRAM(S): at 12:39

## 2018-07-28 NOTE — DISCHARGE NOTE ADULT - MEDICATION SUMMARY - MEDICATIONS TO STOP TAKING
I will STOP taking the medications listed below when I get home from the hospital:    Alpha Lipoic Acid 600 mg oral capsule  -- 1 cap(s) by mouth once a day (at bedtime)    Vitamin D2 50,000 intl units (1.25 mg) oral capsule  -- 1 cap(s) by mouth once a week on Sunday

## 2018-07-28 NOTE — PROGRESS NOTE ADULT - PROBLEM SELECTOR PLAN 1
Patient with recurrent flares, last flare in bilateral ankles in June 2018. Currently with severe pain in midfoot plantar aspect.  Middle finger of L hand appears to warms, reports chronically has swollen joint there  - c/w allopurinol 400 daily  - c/w colchicine 0.6 every other day  - prednisone 10 mg daily x 4 more days  - Tylenol prn for pain  - appreciate rheum c/s, OP f/u

## 2018-07-28 NOTE — PROGRESS NOTE ADULT - ASSESSMENT
58 M with PMH HTN, CKD3, CAD s/p stents (LAD/D1 6329-3904; 2 stent 6/2017 at [place]) on ASA/brillinta, HFrEF with EF 45% EF (05/2017, s/p cardiomems), T2DM on insulin c/b retinopathy and neuropathy and gait ataxia unclear etiology who presented for R sided CP reproducible seems MSK in nature, fevers of unclear etiology (infectious w/u thus far negative: CT chest, blood cx, RVP) being treated for possible gout flare.
59 yo man w/ hx of CAD s/p stents (LAD/D1 stents on 4814-4752; last two placed on 6/2017 at [facility]),  ICMO (mild-mod LV dysfunction, ~ 45% EF as per TTE 2017, has a cardiomems as well), DM, HTN, CKD (baseline ~ 1.5-2) who presented for atypical chest pain.  This is likely MSK pain. Does not appear to be in heart failure. Would continue home cardiac meds. No plan for ischemic workup. Please call back with questions.
58 M with PMH HTN, CKD3, CAD s/p stents (LAD/D1 0947-7749; 2 stent 6/2017 at [place]) on ASA/brillinta, HFrEF with EF 45% EF (05/2017, s/p cardiomems), T2DM on insulin c/b retinopathy and neuropathy and gait ataxia unclear etiology who presented for R sided CP reproducible seems MSK in nature, fevers of unclear etiology (infectious w/u thus far negative: CT chest, blood cx, RVP) being treated for possible gout flare.
58 M with PMH HTN, CKD3, CAD s/p stents (LAD/D1 3910-2027; 2 stent 6/2017 at [place]) on ASA/brillinta, HFrEF with EF 45% EF (05/2017, s/p cardiomems), T2DM on insulin c/b retinopathy and neuropathy and gait ataxia unclear etiology who presented for R sided CP reproducible seems MSK in nature, fevers of unclear etiology (infectious w/u thus far negative: CT chest, blood cx, RVP) being treated for possible gout flare.

## 2018-07-28 NOTE — PROGRESS NOTE ADULT - ATTENDING COMMENTS
No need for inpt echo
Patient stable for dc home today  Reports wife will pick him up  Understands need for close f/u with endo, cards, and rheum  all questions answered  home w/ home PT  dispo time 36 min

## 2018-07-28 NOTE — PROGRESS NOTE ADULT - PROBLEM SELECTOR PLAN 4
Patient does not appear overloaded and this time  - c/w lasix 40 mg daily  - c/w coreg, hydral/imdur  - cards c/s appreciated, no need for stress

## 2018-07-28 NOTE — PROGRESS NOTE ADULT - PROBLEM SELECTOR PLAN 7
Last HbA1c 8.1. On Lantus 60 mg at bedtime and Humalog 12 units premeal.  Suboptimal control with CKD, neuropathy, and retinopathy  - h/o mutiple hypos, 1 here, 1 while at work  - endo c/s appreciated, OP f/u soon  - dc on lantus 55 and 10 w/ meals unless endo suggest otherwise   - c/w FSG and ISS qAC and qhs  - DASH TLC consistent carb diet

## 2018-07-28 NOTE — PROGRESS NOTE ADULT - PROBLEM SELECTOR PLAN 2
Possible 2/2 demand in context of fevers and CAD/HF w/ CKD  - cards c/s reviewed  - patient euvolemic now, will f/u with them re: need for stress
Met SIRs criteria due to fever >102, tachycardia to 101, and leukocytosis 12; thus far no dinah localizing symptoms aside from cough (negative CT chest, RVP).  Blood cx NG x 24 hours.  No urinary symptoms.  No abdominal pain or diarrhea.  No skin lesions. Fever likely 2/2 gout flare  - sepsis ruled out  - blood cx NG x 48 hours  - Tylenol prn for fevers
Possible 2/2 demand in context of fevers and CAD/HF w/ CKD  - cards c/s reviewed  - patient euvolemic now, state no need for stress test

## 2018-07-28 NOTE — DISCHARGE NOTE ADULT - CARE PLAN
Principal Discharge DX:	Cellulitis  Goal:	No further infection  Assessment and plan of treatment:	Monitor for signs of infection such as fever, redness , pain, swelling, rash  Secondary Diagnosis:	Chronic kidney disease (CKD), stage III (moderate)  Assessment and plan of treatment:	Continue blood pressure, cholesterol and diabetic medications. Goal of hemoglobin A1C (HgbA1C) < 7%.  Avoid nephrotoxic drugs such as nonsteroidal anti-inflammatory agents (NSAIDs).   Please follow up with your nephrologist or PMD who has been monitoring your kidney function, continue with low protein and potassium diet.  Secondary Diagnosis:	Diabetes mellitus type II, uncontrolled  Assessment and plan of treatment:	Monitor finger sticks pre-meal and bedtime, low salt, fat and carbohydrate diet, minimize glucose intake.  Exercise daily for at least 30 minutes and weight loss.  Follow up with primary care physician and endocrinologist for routine Hemoglobin A1C checks and management.  Follow up with your ophthalmologist for routine yearly vision exams.  Secondary Diagnosis:	Gout  Assessment and plan of treatment:	Avoid food with high levels of purine such as herring, sardines, cod, tuna, anchovies, and salmon.  Continue current medications as prescribed.  Follow up w/ Dr. Oliveira one week from discharge. Please call [phone]  Follow up with Dr. Christensen in 2-3 wks.  Secondary Diagnosis:	Acute on chronic systolic heart failure  Assessment and plan of treatment:	Low salt diet, monitor your fluid intake and weight daily, exercise as tolerated 30 minutes daily, and follow up with your physician within 1 to 2 weeks. Principal Discharge DX:	Acute gout due to renal impairment involving right foot  Goal:	remain flare free  Assessment and plan of treatment:	You had fevers and no other source was found this was likely 2/2 gout flare.  Continue with allopurinol 400 mg daily, colchicine 0.6 every other day, prednisone 10 mg x 4 days.    Avoid food with high levels of purine such as herring, sardines, cod, tuna, anchovies, and salmon.  Continue current medications as prescribed.  Follow up w/ Dr. Oliveira one week from discharge. Please call [phone]  Follow up with Dr. Christensen in 2-3 wks.  Secondary Diagnosis:	Chronic kidney disease (CKD), stage III (moderate)  Assessment and plan of treatment:	Continue blood pressure, cholesterol and diabetic medications. Goal of hemoglobin A1C (HgbA1C) < 7%.  Avoid nephrotoxic drugs such as nonsteroidal anti-inflammatory agents (NSAIDs).   Please follow up with your nephrologist or PMD who has been monitoring your kidney function, continue with low protein and potassium diet.  Secondary Diagnosis:	Diabetes mellitus type II, uncontrolled  Assessment and plan of treatment:	Monitor finger sticks pre-meal and bedtime, low salt, fat and carbohydrate diet, minimize glucose intake.  Exercise daily for at least 30 minutes and weight loss.  Follow up with primary care physician and endocrinologist for routine Hemoglobin A1C checks and management.  Follow up with your ophthalmologist for routine yearly vision exams.  Secondary Diagnosis:	Chronic systolic heart failure  Assessment and plan of treatment:	Low salt diet, monitor your fluid intake and weight daily, exercise as tolerated 30 minutes daily, and follow up with your physician within 1 to 2 weeks.

## 2018-07-28 NOTE — PROGRESS NOTE ADULT - PROBLEM SELECTOR PLAN 3
Possible 2/2 demand in context of fevers and CAD/HF w/ CKD  - cards c/s reviewed  - patient euvolemic now, state no need for stress test  - TTE unchanged

## 2018-07-28 NOTE — PROGRESS NOTE ADULT - PROBLEM SELECTOR PROBLEM 5
Chronic kidney disease (CKD), stage III (moderate)

## 2018-07-28 NOTE — DISCHARGE NOTE ADULT - NS AS ACTIVITY OBS
Walking-Indoors allowed/Showering allowed/No Heavy lifting/straining/Do not drive or operate machinery

## 2018-07-28 NOTE — DISCHARGE NOTE ADULT - CARE PROVIDER_API CALL
Scarlett Oliveira (DPAISHWARYA), Foot and Ankle Surgery; Podiatric Medicine  [address] [facility] [address]  [address]  [place] [address]  Phone: [phone]  Fax: [phone]    Muriel Christensen (DO), Internal Medicine; Rheumatology  [address]  [place] [address]  Phone: [phone]  Fax: [phone]    Follow up,   with Cardiologist and PMD in 1-2 weeks  Phone: (   )    -  Fax: (   )    -    Jeanne Barragan), Geriatric Medicine; Internal Medicine  [address] [facility] [address]  [address]  [place] [address]  Phone: [phone]  Fax: [phone]

## 2018-07-28 NOTE — DISCHARGE NOTE ADULT - MEDICATION SUMMARY - MEDICATIONS TO TAKE
I will START or STAY ON the medications listed below when I get home from the hospital:    rolling walker  -- Please dispense 1 rolling walker  -- Indication: For walking    Physical therapy  -- bed mobility training; gait training; transfer training; Stair Negotiation    3-5 times a week  -- Indication: For physical therapy    predniSONE 10 mg oral tablet  -- 1 tab(s) by mouth once a day  -- Indication: For Gout    aspirin 81 mg oral delayed release tablet  -- 1 tab(s) by mouth once a day  -- Indication: For Blood thinne    tamsulosin 0.4 mg oral capsule  -- 1 cap(s) by mouth once a day (at bedtime)  -- Indication: For prostate    isosorbide dinitrate 20 mg oral tablet  -- 1 tab(s) by mouth 3 times a day  -- Indication: For Blood pressure/heart    gabapentin 300 mg oral capsule  -- 1 cap(s) by mouth 3 times a day  -- Indication: For pain    Levemir FlexPen 100 units/mL subcutaneous solution  -- 55 unit(s) subcutaneous once a day (at bedtime)  -- Indication: For Diabetes    HumaLOG 100 units/mL subcutaneous solution  -- 10 unit(s) subcutaneous 3 times a day (before meals)  -- Indication: For Diabetes    allopurinol 100 mg oral tablet  -- 4 tab(s) by mouth once a day  -- Indication: For Gout    colchicine 0.6 mg oral tablet  -- 1 tab(s) by mouth every 48 hours  -- Indication: For Gout    levocetirizine 5 mg oral tablet  -- 1 tab(s) by mouth once a day (in the evening)  -- Indication: For Allergy    atorvastatin 80 mg oral tablet  -- 1 tab(s) by mouth once a day (at bedtime)  -- Indication: For Cholesterol    ticagrelor 90 mg oral tablet  -- 1 tab(s) by mouth 2 times a day  -- Indication: For Blood thinner    carvedilol 12.5 mg oral tablet  -- 1 tab(s) by mouth every 12 hours  -- Indication: For Heart rate/function    furosemide 40 mg oral tablet  -- 1 tab(s) by mouth once a day  -- Indication: For fluid  balance    guaiFENesin 600 mg oral tablet, extended release  -- 1 tab(s) by mouth every 12 hours  -- Indication: For Cough    pantoprazole 40 mg oral delayed release tablet  -- 1 tab(s) by mouth once a day (before a meal)  -- Indication: For Stomach protection    hydrALAZINE 25 mg oral tablet  -- 3 tab(s) by mouth 3 times a day  -- Indication: For Blood pressure I will START or STAY ON the medications listed below when I get home from the hospital:    rolling walker  -- Please dispense 1 rolling walker  -- Indication: For walking    predniSONE 10 mg oral tablet  -- 1 tab(s) by mouth once a day  -- Indication: For Gout    aspirin 81 mg oral delayed release tablet  -- 1 tab(s) by mouth once a day  -- Indication: For Blood thinne    tamsulosin 0.4 mg oral capsule  -- 1 cap(s) by mouth once a day (at bedtime)  -- Indication: For prostate    isosorbide dinitrate 20 mg oral tablet  -- 1 tab(s) by mouth 3 times a day  -- Indication: For Blood pressure/heart    gabapentin 300 mg oral capsule  -- 1 cap(s) by mouth 3 times a day  -- Indication: For pain    HumaLOG 100 units/mL subcutaneous solution  -- 10 unit(s) subcutaneous 3 times a day (before meals)  -- Indication: For Diabetes    Levemir FlexPen 100 units/mL subcutaneous solution  -- 40 unit(s) subcutaneous once a day (at bedtime)  -- Indication: For Diabetes mellitus type II, uncontrolled    allopurinol 100 mg oral tablet  -- 4 tab(s) by mouth once a day  -- Indication: For Gout    colchicine 0.6 mg oral tablet  -- 1 tab(s) by mouth every 48 hours  -- Indication: For Gout    levocetirizine 5 mg oral tablet  -- 1 tab(s) by mouth once a day (in the evening)  -- Indication: For Allergy    atorvastatin 80 mg oral tablet  -- 1 tab(s) by mouth once a day (at bedtime)  -- Indication: For Cholesterol    ticagrelor 90 mg oral tablet  -- 1 tab(s) by mouth 2 times a day  -- Indication: For Blood thinner    carvedilol 12.5 mg oral tablet  -- 1 tab(s) by mouth every 12 hours  -- Indication: For Heart rate/function    furosemide 40 mg oral tablet  -- 1 tab(s) by mouth once a day  -- Indication: For fluid  balance    guaiFENesin 600 mg oral tablet, extended release  -- 1 tab(s) by mouth every 12 hours  -- Indication: For Cough    pantoprazole 40 mg oral delayed release tablet  -- 1 tab(s) by mouth once a day (before a meal)  -- Indication: For Stomach protection    hydrALAZINE 25 mg oral tablet  -- 3 tab(s) by mouth 3 times a day  -- Indication: For Blood pressure

## 2018-07-28 NOTE — DISCHARGE NOTE ADULT - ADDITIONAL INSTRUCTIONS
Follow up w/ Dr. Oliveira one week from discharge. Please call [phone]  Follow up with Dr. Christensen in 2-3 wks.  follow up with your endocrinologist within 1 week.  Follow up with Cardiologist within 1 week  Follow up with Nephrology and PMD

## 2018-07-28 NOTE — DISCHARGE NOTE ADULT - PROVIDER TOKENS
TOKEN:'[id number]',TOKEN:'[id number]',FREE:[LAST:[Follow up],PHONE:[(   )    -],FAX:[(   )    -],ADDRESS:[with Cardiologist and PMD in 1-2 weeks]],TOKEN:'[id number]'

## 2018-07-28 NOTE — DISCHARGE NOTE ADULT - SECONDARY DIAGNOSIS.
Chronic kidney disease (CKD), stage III (moderate) Diabetes mellitus type II, uncontrolled Gout Acute on chronic systolic heart failure Chronic systolic heart failure

## 2018-07-28 NOTE — DISCHARGE NOTE ADULT - PLAN OF CARE
No further infection Monitor for signs of infection such as fever, redness , pain, swelling, rash Continue blood pressure, cholesterol and diabetic medications. Goal of hemoglobin A1C (HgbA1C) < 7%.  Avoid nephrotoxic drugs such as nonsteroidal anti-inflammatory agents (NSAIDs).   Please follow up with your nephrologist or PMD who has been monitoring your kidney function, continue with low protein and potassium diet. Monitor finger sticks pre-meal and bedtime, low salt, fat and carbohydrate diet, minimize glucose intake.  Exercise daily for at least 30 minutes and weight loss.  Follow up with primary care physician and endocrinologist for routine Hemoglobin A1C checks and management.  Follow up with your ophthalmologist for routine yearly vision exams. Avoid food with high levels of purine such as herring, sardines, cod, tuna, anchovies, and salmon.  Continue current medications as prescribed.  Follow up w/ Dr. Oliveira one week from discharge. Please call [phone]  Follow up with Dr. Christensen in 2-3 wks. Low salt diet, monitor your fluid intake and weight daily, exercise as tolerated 30 minutes daily, and follow up with your physician within 1 to 2 weeks. remain flare free You had fevers and no other source was found this was likely 2/2 gout flare.  Continue with allopurinol 400 mg daily, colchicine 0.6 every other day, prednisone 10 mg x 4 days.    Avoid food with high levels of purine such as herring, sardines, cod, tuna, anchovies, and salmon.  Continue current medications as prescribed.  Follow up w/ Dr. Oliveira one week from discharge. Please call [phone]  Follow up with Dr. Christensen in 2-3 wks.

## 2018-07-28 NOTE — PROGRESS NOTE ADULT - PROBLEM SELECTOR PLAN 5
Baseline CKD stage 3, with Cr 1.7. Currently mildly elevated in setting of sepsis and acute on chronic heart failure. No electrolyte anomalies.   - c/w to monitor  - renally dose meds  - monitor BMP, electrolytes  - strict I/Os
Baseline CKD stage 3  - c/w to monitor  - renally dose meds  - monitor BMP, electrolytes  - strict I/Os
Baseline CKD stage 3  - c/w to monitor  - renally dose meds  - monitor BMP, electrolytes  - strict I/Os

## 2018-07-28 NOTE — DISCHARGE NOTE ADULT - CARE PROVIDERS DIRECT ADDRESSES
,DirectAddress_Unknown,[email or web address],DirectAddress_Unknown,[email or web address]

## 2018-07-28 NOTE — PROGRESS NOTE ADULT - PROBLEM SELECTOR PROBLEM 9
Benign prostatic hyperplasia, unspecified whether lower urinary tract symptoms present

## 2018-07-28 NOTE — DISCHARGE NOTE ADULT - MEDICATION SUMMARY - MEDICATIONS TO CHANGE
I will SWITCH the dose or number of times a day I take the medications listed below when I get home from the hospital:    isosorbide dinitrate 30 mg oral tablet  -- orally 3 times a day    HumaLOG 100 units/mL subcutaneous solution  -- 12 unit(s) subcutaneous 3 times a day (before meals)    Levemir FlexPen 100 units/mL subcutaneous solution  -- 60 unit(s) subcutaneous once a day (at bedtime)

## 2018-07-28 NOTE — DISCHARGE NOTE ADULT - PATIENT PORTAL LINK FT
You can access the [facility] Patient Portal, offered by [facility], by registering with the following website: [email or web address]

## 2018-07-28 NOTE — DISCHARGE NOTE ADULT - HOSPITAL COURSE
59 y/o male with a PMHx of CAD S/P stent placement on DAPT, ischemic cardiomyopathy with mild to moderate LV dysfunction (EF 46%) S/P Cardiomems, stage III CKD, HTN, HLD, DM complicated by retinopathy and neuropathy, and gout presents to ED with pleuritic chest pain, found to be febrile with leukocytosis concerning for sepsis.    He was treated for:  + Sepsis of unclear etiology- on Zithromax and Ceftriaxone for suspected pulmonary source, although CT chest with no PNA, S/P 2L NS in ED then  held  + Acute on chronic systolic heart failure-  S/P Lasix 40mg IVP BID, changed to PO 7/26  + Gout flare- continued on home dose Allopurinol, started Colchicine every 48 hours and Tylenol    Testing showed:  May 2017, Echo: EF 46%. Grossly mild to moderate segmental left ventricular systolic dysfunction. Hypokinesis of the apex and distal septum.  ----------  EKG: NSR at 100 bpm, Q-waves V1-V2  CE x2: Trop 57-->49  WBC: 12.18  H/H: 11.1/32.9  BUN/Cr: 48/2.00  Glucose: 288  RVP: Negative  Legionella NEG  Blood cultures negative x 24 hours    7/25 CXR: Clear lungs.  7/25 X-ray right foot/ankle: No acute fracture or dislocation. No cortical erosions.    7/26 CT chest: Clear lungs.    7/27 Echo: CONCLUSIONS: EF 59%  1. Mild segmental left ventricular systolic dysfunction.  Hypokinesis of apex, distal septum and distal inferior  wall. Global LV function is preserved  2. Mild diastolic dysfunction (Stage I).  3. Normal right ventricular size and function.  *** Compared with echocardiogram of 5/8/2017, no  significant changes noted.    Discharge home with outpatient PT on 7/28/18. 59 y/o male with a PMHx of CAD S/P stent placement on DAPT, ischemic cardiomyopathy with mild to moderate LV dysfunction (EF 46%) S/P Cardiomems, stage III CKD, HTN, HLD, DM complicated by retinopathy and neuropathy, and gout presents to ED with pleuritic chest pain, found to be febrile with leukocytosis concerning for sepsis.    He presented w/ SIRS criteria (WBC, fever, tachy) however no source was identified (negative blood cx, RVP, CT chest, no diarrhea or dyusria) so antibitics were not given.    He was treated for gout flare although not classic (perhaps early) of L middle finger and R foot--pain completely improved at time of dc with colchicine and pred 10 mg.  Seen by podiatry, rheum.  Some of foot pain more c/w plantar fascitis.    Chest pain was likely non-cardiac.  Had very intense pain on palpation of R chest wall near neck/shoulder.  That also improved with treatment of gout unclear if can be realted.  Seen by cardiology.  Not in heart failure continued with home regimen.  No need for stress.  TTE unchanged from prior.      Testing showed:  May 2017, Echo: EF 46%. Grossly mild to moderate segmental left ventricular systolic dysfunction. Hypokinesis of the apex and distal septum.  ----------  EKG: NSR at 100 bpm, Q-waves V1-V2  CE x2: Trop 57-->49  WBC: 12.18  H/H: 11.1/32.9  BUN/Cr: 48/2.00  Glucose: 288  RVP: Negative  Legionella NEG  Blood cultures negative x 24 hours    7/25 CXR: Clear lungs.  7/25 X-ray right foot/ankle: No acute fracture or dislocation. No cortical erosions.    7/26 CT chest: Clear lungs.    7/27 Echo: CONCLUSIONS: EF 59%  1. Mild segmental left ventricular systolic dysfunction.  Hypokinesis of apex, distal septum and distal inferior  wall. Global LV function is preserved  2. Mild diastolic dysfunction (Stage I).  3. Normal right ventricular size and function.  *** Compared with echocardiogram of 5/8/2017, no  significant changes noted.    Discharge home with outpatient PT on 7/28/18. 57 y/o male with a PMHx of CAD S/P stent placement on DAPT, ischemic cardiomyopathy with mild to moderate LV dysfunction (EF 46%) S/P Cardiomems, stage III CKD, HTN, HLD, DM complicated by retinopathy and neuropathy, and gout presents to ED with pleuritic chest pain, found to be febrile with leukocytosis concerning for sepsis.    He presented w/ SIRS criteria (WBC, fever, tachy) however no source was identified (negative blood cx, RVP, CT chest, no diarrhea or dyusria) so antibitics were not given.    He was treated for gout flare although not classic (perhaps early) of L middle finger and R foot--pain completely improved at time of dc with colchicine and pred 10 mg.  Seen by podiatry, rheum.  Some of foot pain more c/w plantar fascitis.    For DM2 had episode of hypo here (and recently as OP s/p ED visit).  Seen by endo.  Recommend decreasing home regimen from 12 ac and 62 qhs to 10 ac and 40 qhs with OP endo f/u.    Chest pain was likely non-cardiac.  Had very intense pain on palpation of R chest wall near neck/shoulder.  That also improved with treatment of gout unclear if can be realted.  Seen by cardiology.  Not in heart failure continued with home regimen.  No need for stress.  TTE unchanged from prior.      Testing showed:  May 2017, Echo: EF 46%. Grossly mild to moderate segmental left ventricular systolic dysfunction. Hypokinesis of the apex and distal septum.  ----------  EKG: NSR at 100 bpm, Q-waves V1-V2  CE x2: Trop 57-->49  WBC: 12.18  H/H: 11.1/32.9  BUN/Cr: 48/2.00  Glucose: 288  RVP: Negative  Legionella NEG  Blood cultures negative x 24 hours    7/25 CXR: Clear lungs.  7/25 X-ray right foot/ankle: No acute fracture or dislocation. No cortical erosions.    7/26 CT chest: Clear lungs.    7/27 Echo: CONCLUSIONS: EF 59%  1. Mild segmental left ventricular systolic dysfunction.  Hypokinesis of apex, distal septum and distal inferior  wall. Global LV function is preserved  2. Mild diastolic dysfunction (Stage I).  3. Normal right ventricular size and function.  *** Compared with echocardiogram of 5/8/2017, no  significant changes noted.    Discharge home with outpatient PT on 7/28/18. 59 y/o male with a PMHx of CAD S/P stent placement on DAPT, ischemic cardiomyopathy with mild to moderate LV dysfunction (EF 46%) S/P Cardiomems, stage III CKD, HTN, HLD, DM complicated by retinopathy and neuropathy, and gout presents to ED with pleuritic chest pain, found to be febrile with leukocytosis concerning for sepsis.    He presented w/ SIRS criteria (WBC, fever, tachy) however no source was identified (negative blood cx, RVP, CT chest, no diarrhea or dyusria) so antibitics were not given.    He was treated for gout flare although not classic (perhaps early) of L middle finger and R foot--pain completely improved at time of dc with colchicine and pred 10 mg.  Seen by podiatry, rheum.  Some of foot pain more c/w plantar fascitis.    For DM2 had episode of hypo here (and recently as OP s/p ED visit).  Seen by endo.  Recommend decreasing home regimen from 12 ac and 62 qhs to 10 ac and 40 qhs with OP endo f/u.    Chest pain was likely non-cardiac.  Had very intense pain on palpation of R chest wall.  That also improved with treatment of gout unclear if can be related.  Seen by cardiology.  Not in heart failure continued with home regimen.  No need for stress.  TTE unchanged from prior.      Testing showed:  May 2017, Echo: EF 46%. Grossly mild to moderate segmental left ventricular systolic dysfunction. Hypokinesis of the apex and distal septum.  ----------  EKG: NSR at 100 bpm, Q-waves V1-V2  CE x2: Trop 57-->49  WBC: 12.18  H/H: 11.1/32.9  BUN/Cr: 48/2.00  Glucose: 288  RVP: Negative  Legionella NEG  Blood cultures negative x 24 hours    7/25 CXR: Clear lungs.  7/25 X-ray right foot/ankle: No acute fracture or dislocation. No cortical erosions.    7/26 CT chest: Clear lungs.    7/27 Echo: CONCLUSIONS: EF 59%  1. Mild segmental left ventricular systolic dysfunction.  Hypokinesis of apex, distal septum and distal inferior  wall. Global LV function is preserved  2. Mild diastolic dysfunction (Stage I).  3. Normal right ventricular size and function.  *** Compared with echocardiogram of 5/8/2017, no  significant changes noted.    Discharge home with outpatient PT on 7/28/18.

## 2018-07-28 NOTE — PROGRESS NOTE ADULT - PROBLEM SELECTOR PROBLEM 3
Chronic systolic (congestive) heart failure
Troponin level elevated
Chronic systolic (congestive) heart failure

## 2018-07-28 NOTE — PROGRESS NOTE ADULT - PROBLEM SELECTOR PROBLEM 8
Neuropathy due to type 2 diabetes mellitus

## 2018-07-30 LAB
BACTERIA BLD CULT: SIGNIFICANT CHANGE UP
BACTERIA BLD CULT: SIGNIFICANT CHANGE UP

## 2018-08-17 ENCOUNTER — OTHER (OUTPATIENT)
Age: 58
End: 2018-08-17

## 2018-08-25 ENCOUNTER — OTHER (OUTPATIENT)
Age: 58
End: 2018-08-25

## 2018-08-25 ENCOUNTER — APPOINTMENT (OUTPATIENT)
Dept: INTERNAL MEDICINE | Facility: CLINIC | Age: 58
End: 2018-08-25
Payer: COMMERCIAL

## 2018-08-25 VITALS
WEIGHT: 192 LBS | HEART RATE: 95 BPM | DIASTOLIC BLOOD PRESSURE: 70 MMHG | OXYGEN SATURATION: 96 % | TEMPERATURE: 98.3 F | BODY MASS INDEX: 30.13 KG/M2 | HEIGHT: 67 IN | SYSTOLIC BLOOD PRESSURE: 115 MMHG

## 2018-08-25 DIAGNOSIS — E79.0 HYPERURICEMIA W/OUT SIGNS OF INFLAMMATORY ARTHRITIS AND TOPHACEOUS DISEASE: ICD-10-CM

## 2018-08-25 DIAGNOSIS — Z86.79 PERSONAL HISTORY OF OTHER DISEASES OF THE CIRCULATORY SYSTEM: ICD-10-CM

## 2018-08-25 DIAGNOSIS — Z86.39 PERSONAL HISTORY OF OTHER ENDOCRINE, NUTRITIONAL AND METABOLIC DISEASE: ICD-10-CM

## 2018-08-25 PROCEDURE — 99214 OFFICE O/P EST MOD 30 MIN: CPT | Mod: 25

## 2018-08-25 PROCEDURE — 36415 COLL VENOUS BLD VENIPUNCTURE: CPT

## 2018-08-25 NOTE — PHYSICAL EXAM
[No Acute Distress] : no acute distress [Well Nourished] : well nourished [Well Developed] : well developed [PERRL] : pupils equal round and reactive to light [No Respiratory Distress] : no respiratory distress  [Clear to Auscultation] : lungs were clear to auscultation bilaterally [No Accessory Muscle Use] : no accessory muscle use [Normal Rate] : normal rate  [Regular Rhythm] : with a regular rhythm [Normal S1, S2] : normal S1 and S2 [No Edema] : there was no peripheral edema [Soft] : abdomen soft [Non Tender] : non-tender [No HSM] : no HSM [Normal Bowel Sounds] : normal bowel sounds

## 2018-08-25 NOTE — REVIEW OF SYSTEMS
[Joint Pain] : joint pain [Joint Stiffness] : joint stiffness [Negative] : Respiratory [de-identified] : neuropathic symp - burning

## 2018-08-25 NOTE — HISTORY OF PRESENT ILLNESS
[FreeTextEntry1] : here for f/u , 1 mo after discharge from hospital for atypical chest pain, cardiac etiology was R/O and pain was felt to be MSK \par he was discharged on prednisone and alternate day  colchicine ( b/o CRF)\par he reports that the latest A1c was 9 \par no hypo\par \par pt has CRF / CHF  with CardioMEMS placement /CAD - s/p PTCA  and stenting / recent onset anemia\par  [de-identified] : he reports that the swelling in the feet from the gout has gone down / BG still high- is scheduled for endo f/u \par he has poor gait balance possible 2/2 to combination of neuropathy and gout \par no falls, but gait is unstable/ MRI brain was neg , is scheduled for neuromusc. specialist alysha

## 2018-08-25 NOTE — ASSESSMENT
[FreeTextEntry1] : # gout \par - will ct low dose prednisone / alt day cochicine \par - f/u rheum\par - AE of the meds discussed \par \par # DM \par - ct meds \par - endo f/u - check A1C \par \par # CHF\par - stable\par - ct current management \par - f/u cardio \par - low salt diet \par \par #  CRF \par - monitor  renal fx \par \par # anemia\par - relatively recent onset \par - check CBC / ferritin / B12/ folate \par - GI eval - need to r/o blood loss, also needs screening for CRC

## 2018-08-27 LAB
ALBUMIN SERPL ELPH-MCNC: 4 G/DL
ALP BLD-CCNC: 95 U/L
ALT SERPL-CCNC: 15 U/L
ANION GAP SERPL CALC-SCNC: 17 MMOL/L
AST SERPL-CCNC: 16 U/L
BASOPHILS # BLD AUTO: 0.04 K/UL
BASOPHILS NFR BLD AUTO: 0.7 %
BILIRUB SERPL-MCNC: 0.3 MG/DL
BUN SERPL-MCNC: 39 MG/DL
CALCIUM SERPL-MCNC: 8.7 MG/DL
CHLORIDE SERPL-SCNC: 101 MMOL/L
CO2 SERPL-SCNC: 21 MMOL/L
CREAT SERPL-MCNC: 2.19 MG/DL
EOSINOPHIL # BLD AUTO: 0.39 K/UL
EOSINOPHIL NFR BLD AUTO: 6.6 %
FERRITIN SERPL-MCNC: 184 NG/ML
FOLATE SERPL-MCNC: 10.5 NG/ML
GLUCOSE SERPL-MCNC: 334 MG/DL
HBA1C MFR BLD HPLC: 9.2 %
HCT VFR BLD CALC: 32.9 %
HGB BLD-MCNC: 10.5 G/DL
IMM GRANULOCYTES NFR BLD AUTO: 0.2 %
LYMPHOCYTES # BLD AUTO: 2.3 K/UL
LYMPHOCYTES NFR BLD AUTO: 38.9 %
MAN DIFF?: NORMAL
MCHC RBC-ENTMCNC: 29 PG
MCHC RBC-ENTMCNC: 31.9 GM/DL
MCV RBC AUTO: 90.9 FL
MONOCYTES # BLD AUTO: 0.43 K/UL
MONOCYTES NFR BLD AUTO: 7.3 %
NEUTROPHILS # BLD AUTO: 2.74 K/UL
NEUTROPHILS NFR BLD AUTO: 46.3 %
PLATELET # BLD AUTO: 197 K/UL
POTASSIUM SERPL-SCNC: 5 MMOL/L
PROT SERPL-MCNC: 6.5 G/DL
RBC # BLD: 3.62 M/UL
RBC # FLD: 15.8 %
SODIUM SERPL-SCNC: 139 MMOL/L
TRANSFERRIN SERPL-MCNC: 176 MG/DL
URATE SERPL-MCNC: 9.6 MG/DL
VIT B12 SERPL-MCNC: 334 PG/ML
WBC # FLD AUTO: 5.91 K/UL

## 2018-09-07 ENCOUNTER — APPOINTMENT (OUTPATIENT)
Dept: OPHTHALMOLOGY | Facility: CLINIC | Age: 58
End: 2018-09-07
Payer: COMMERCIAL

## 2018-09-07 DIAGNOSIS — H04.203 UNSPECIFIED EPIPHORA, BILATERAL: ICD-10-CM

## 2018-09-07 DIAGNOSIS — H35.373 PUCKERING OF MACULA, BILATERAL: ICD-10-CM

## 2018-09-07 PROCEDURE — 92134 CPTRZ OPH DX IMG PST SGM RTA: CPT

## 2018-09-07 PROCEDURE — 92014 COMPRE OPH EXAM EST PT 1/>: CPT

## 2018-09-19 ENCOUNTER — APPOINTMENT (OUTPATIENT)
Dept: NEUROLOGY | Facility: CLINIC | Age: 58
End: 2018-09-19
Payer: COMMERCIAL

## 2018-09-19 PROCEDURE — 95910 NRV CNDJ TEST 7-8 STUDIES: CPT

## 2018-09-19 PROCEDURE — 95886 MUSC TEST DONE W/N TEST COMP: CPT

## 2018-09-27 ENCOUNTER — TRANSCRIPTION ENCOUNTER (OUTPATIENT)
Age: 58
End: 2018-09-27

## 2018-10-01 ENCOUNTER — RX RENEWAL (OUTPATIENT)
Age: 58
End: 2018-10-01

## 2018-10-02 ENCOUNTER — APPOINTMENT (OUTPATIENT)
Dept: RHEUMATOLOGY | Facility: CLINIC | Age: 58
End: 2018-10-02

## 2018-10-02 ENCOUNTER — TRANSCRIPTION ENCOUNTER (OUTPATIENT)
Age: 58
End: 2018-10-02

## 2018-10-05 ENCOUNTER — RX RENEWAL (OUTPATIENT)
Age: 58
End: 2018-10-05

## 2018-10-10 ENCOUNTER — APPOINTMENT (OUTPATIENT)
Dept: NEUROLOGY | Facility: CLINIC | Age: 58
End: 2018-10-10

## 2018-10-15 ENCOUNTER — RX RENEWAL (OUTPATIENT)
Age: 58
End: 2018-10-15

## 2018-10-23 ENCOUNTER — APPOINTMENT (OUTPATIENT)
Dept: NEUROLOGY | Facility: CLINIC | Age: 58
End: 2018-10-23
Payer: COMMERCIAL

## 2018-10-23 VITALS
HEART RATE: 91 BPM | WEIGHT: 195 LBS | SYSTOLIC BLOOD PRESSURE: 180 MMHG | HEIGHT: 67 IN | DIASTOLIC BLOOD PRESSURE: 85 MMHG | BODY MASS INDEX: 30.61 KG/M2

## 2018-10-23 PROCEDURE — 99205 OFFICE O/P NEW HI 60 MIN: CPT

## 2018-10-23 RX ORDER — PREDNISONE 10 MG/1
10 TABLET ORAL DAILY
Qty: 30 | Refills: 0 | Status: DISCONTINUED | COMMUNITY
Start: 2018-08-25 | End: 2018-10-23

## 2018-10-23 RX ORDER — PREDNISOLONE ACETATE 10 MG/ML
1 SUSPENSION/ DROPS OPHTHALMIC 4 TIMES DAILY
Qty: 1 | Refills: 2 | Status: DISCONTINUED | COMMUNITY
Start: 2017-07-26 | End: 2018-10-23

## 2018-10-25 ENCOUNTER — RX RENEWAL (OUTPATIENT)
Age: 58
End: 2018-10-25

## 2018-10-26 ENCOUNTER — RX RENEWAL (OUTPATIENT)
Age: 58
End: 2018-10-26

## 2018-10-30 ENCOUNTER — APPOINTMENT (OUTPATIENT)
Dept: INTERNAL MEDICINE | Facility: CLINIC | Age: 58
End: 2018-10-30
Payer: COMMERCIAL

## 2018-10-30 VITALS
DIASTOLIC BLOOD PRESSURE: 86 MMHG | SYSTOLIC BLOOD PRESSURE: 160 MMHG | TEMPERATURE: 98.6 F | HEIGHT: 67 IN | WEIGHT: 196 LBS | BODY MASS INDEX: 30.76 KG/M2

## 2018-10-30 DIAGNOSIS — M10.9 GOUT, UNSPECIFIED: ICD-10-CM

## 2018-10-30 DIAGNOSIS — N18.3 CHRONIC KIDNEY DISEASE, STAGE 3 (MODERATE): ICD-10-CM

## 2018-10-30 PROCEDURE — 36415 COLL VENOUS BLD VENIPUNCTURE: CPT

## 2018-10-30 PROCEDURE — 99214 OFFICE O/P EST MOD 30 MIN: CPT | Mod: 25

## 2018-10-30 RX ORDER — INSULIN DETEMIR 100 [IU]/ML
100 INJECTION, SOLUTION SUBCUTANEOUS
Qty: 10 | Refills: 5 | Status: DISCONTINUED | COMMUNITY
Start: 2018-01-16 | End: 2018-10-30

## 2018-10-30 RX ORDER — PREDNISONE 20 MG/1
20 TABLET ORAL
Qty: 8 | Refills: 0 | Status: DISCONTINUED | COMMUNITY
Start: 2018-06-11 | End: 2018-10-30

## 2018-10-30 RX ORDER — CLINDAMYCIN HYDROCHLORIDE 300 MG/1
300 CAPSULE ORAL
Qty: 21 | Refills: 0 | Status: COMPLETED | COMMUNITY
Start: 2018-06-11 | End: 2018-10-30

## 2018-10-30 NOTE — HISTORY OF PRESENT ILLNESS
[de-identified] : This is a 58 yrs old male with PMHX of chronic uncontrolled DM2 x 25 years with diabetic neuropathy and retinopathy, HTN, CAD s/p cardiac stents, HF with cardiomems device in situ, BPH, GERD, HLD, anemia, gout, Vit B12 deficinecy and Vit D deficiency, CKD and anemia, presents today for f/u \par \par hx gout \par - better now feet better \par -on colcrys and allopurinol \par - saw rheum \par \par CAD- \par -had few stents placed 6/2017 2 stents , had 2 stents placed in past \par - had cardiomes placed in 6/2017 \par -followed by cardio at [facility] Dr Ai Torres 2/2017 # [id number]- last visit was 11/2017 - he referred him to vascular for swollen feet - has to schedule apt \par -went up on metoprolol to 50 mg daily , , lipitor ? dose 40 , isosorbide 20 mg 3 tabs daily , hydralazine 50 3 tabs daily \par -did nuclear stress test , and ECHO earlier this year \par - denies any cp , sob, feels tired easily , no palpitations\par \par DM- uncontrolled AIC 9.2 8/2018 \par -with retinopathy , neuropathy , nephropathy - last eye exam 9/2018 -followed by endo and neuro ophtho \par -mainly upper 180-200's , no readings less than 140 , no hypoglycemic episode\par - seeing ophtho + diabetic retinopathy , DM neuropathy and nephropathy \par -seeing endocrinologist  now \par  ,Levemir to 55 units at bed time and increased humolog 12 units TID with meals , \par -did not see diabetic educator \par + tingling in b/l feet saw podiatrist recommended gabapentin - 300 mg 3 tabs daily \par \par Hypertension/cad h/o stents - denies any chest pain , sob , palpitation , no dizzy spells , compliant with medications \par \par Hyperlipidemia- on lipitor 80 mg daily , \par \par

## 2018-10-30 NOTE — PHYSICAL EXAM
[No Acute Distress] : no acute distress [Well Nourished] : well nourished [Well Developed] : well developed [No Respiratory Distress] : no respiratory distress  [Clear to Auscultation] : lungs were clear to auscultation bilaterally [No Accessory Muscle Use] : no accessory muscle use [Normal Rate] : normal rate  [Regular Rhythm] : with a regular rhythm [Normal S1, S2] : normal S1 and S2 [Soft] : abdomen soft [Non Tender] : non-tender [Non-distended] : non-distended

## 2018-10-30 NOTE — DATA REVIEWED
[FreeTextEntry1] : 8/2018\par HGB 10.5 g/dL L \par HGB A1C 9.2 % H \par   B12 334 pg/mL   [id number] New \par   Folate 10.5 ng/mL \par Creatinine 2.19 mg/dL \par

## 2018-10-30 NOTE — ASSESSMENT
[FreeTextEntry1] : \par h/o chronic gout now with acte flare b/l foot rt > left \par - continue allopurinol \par - low purine dies reviewed \par - rx for colcrys given \par -saw rheum \par \par B/l leg swelling - h/o CHF-discussed no salt diet \par -keep legs elevated - water restriction to 1.5 liter - on furosemide 40 daily check lytes \par -compression stockings given \par \par DM- Uncontrolled associated with macrovascular and microvascular complications \par AIC 8/25 - 9.2 \par - non compliant with medication and diet / appointments \par -change to  Levemir to 60 units at bedtime , on humolog 12-20 units with meals TID, compliance with medication and diet educated. \par -off metformin due to elevated CR - add glipizide 5 mg xl daily \par -educated risks of uncontrolled sugars - including renal failure cad, neuropathy, retinopathy, amputations , poor wound healing, pt verbalize and agrees to be compliant with diet and medications and appointments. \par - Monitor Accu-Cheks daily, ophthalmology followup, podiatry follow up. Monitor for signs of hypoglycemia. \par d/w pt to eat low carbohydrate 1800 kcal ADA diet, avoid rice, pasta, sugar, sweet, soda, juices, exercise daily walking 30 minutes and do resistance training twice a week.\par -keep appt to see endocrine given again , nutritionist \par \par Coronary artery disease, history of stents-  6/2017 \par -to see cardiology for clearance for colonoscope \par -reviewed stress test \par \par Hypertension- Controlled, continue current medications, low sodium-DASH diet.Advised include 3-4 servings of vegetables.\par \par Hyperlipidemia-\par - atorvastatin 80 mg daily \par - check lipid levels and liver functions \par -Encouraged to eat a Low-fat diet, avoid red meat, cheese, butter, peanuts and exercise daily for 40 minutes\par \par Diabetic nephropathy- worsening with Cr > 2 now check CMP\par - educated tighter control for sugars, cholesterol, and blood pressure, risk of end-stage renal disease requiring dialysis discussed with pt \par -Referral to see nephrologist given.\par \par HCM\par Colonoscope never - educated colon cancer risk , pt verbalized referral to do FIt and Gastro given \par Pneumovax 23 given 2017 \par flu vaccine got at pharmacy as per pt \par \par

## 2018-10-31 LAB
ALBUMIN SERPL ELPH-MCNC: 4.4 G/DL
ALP BLD-CCNC: 109 U/L
ALT SERPL-CCNC: 13 U/L
ANION GAP SERPL CALC-SCNC: 17 MMOL/L
AST SERPL-CCNC: 14 U/L
BILIRUB SERPL-MCNC: 0.3 MG/DL
BUN SERPL-MCNC: 36 MG/DL
CALCIUM SERPL-MCNC: 8.9 MG/DL
CHLORIDE SERPL-SCNC: 92 MMOL/L
CHOLEST SERPL-MCNC: 174 MG/DL
CHOLEST/HDLC SERPL: 4.4 RATIO
CO2 SERPL-SCNC: 25 MMOL/L
CREAT SERPL-MCNC: 2.16 MG/DL
GLUCOSE SERPL-MCNC: 550 MG/DL
HDLC SERPL-MCNC: 40 MG/DL
LDLC SERPL CALC-MCNC: NORMAL
POTASSIUM SERPL-SCNC: 4.8 MMOL/L
PROT SERPL-MCNC: 6.8 G/DL
SODIUM SERPL-SCNC: 134 MMOL/L
TRIGL SERPL-MCNC: 468 MG/DL

## 2018-11-07 ENCOUNTER — INBOUND DOCUMENT (OUTPATIENT)
Age: 58
End: 2018-11-07

## 2018-11-07 ENCOUNTER — APPOINTMENT (OUTPATIENT)
Dept: NEUROLOGY | Facility: CLINIC | Age: 58
End: 2018-11-07
Payer: COMMERCIAL

## 2018-11-07 VITALS
HEIGHT: 67 IN | WEIGHT: 196 LBS | DIASTOLIC BLOOD PRESSURE: 71 MMHG | HEART RATE: 96 BPM | BODY MASS INDEX: 30.76 KG/M2 | SYSTOLIC BLOOD PRESSURE: 155 MMHG

## 2018-11-07 PROCEDURE — 99214 OFFICE O/P EST MOD 30 MIN: CPT

## 2018-11-14 ENCOUNTER — RX RENEWAL (OUTPATIENT)
Age: 58
End: 2018-11-14

## 2018-11-27 ENCOUNTER — APPOINTMENT (OUTPATIENT)
Dept: RHEUMATOLOGY | Facility: CLINIC | Age: 58
End: 2018-11-27
Payer: COMMERCIAL

## 2018-11-27 VITALS
WEIGHT: 204 LBS | TEMPERATURE: 98.3 F | SYSTOLIC BLOOD PRESSURE: 161 MMHG | OXYGEN SATURATION: 97 % | HEART RATE: 91 BPM | HEIGHT: 67 IN | DIASTOLIC BLOOD PRESSURE: 75 MMHG | BODY MASS INDEX: 32.02 KG/M2

## 2018-11-27 PROCEDURE — 99214 OFFICE O/P EST MOD 30 MIN: CPT

## 2018-12-11 ENCOUNTER — APPOINTMENT (OUTPATIENT)
Dept: ENDOCRINOLOGY | Facility: CLINIC | Age: 58
End: 2018-12-11
Payer: COMMERCIAL

## 2018-12-11 ENCOUNTER — OTHER (OUTPATIENT)
Age: 58
End: 2018-12-11

## 2018-12-11 VITALS
HEART RATE: 87 BPM | SYSTOLIC BLOOD PRESSURE: 110 MMHG | OXYGEN SATURATION: 95 % | WEIGHT: 199 LBS | BODY MASS INDEX: 31.23 KG/M2 | DIASTOLIC BLOOD PRESSURE: 80 MMHG | HEIGHT: 67 IN

## 2018-12-11 LAB
GLUCOSE BLDC GLUCOMTR-MCNC: 284
HBA1C MFR BLD HPLC: 9.3

## 2018-12-11 PROCEDURE — 99214 OFFICE O/P EST MOD 30 MIN: CPT

## 2018-12-11 RX ORDER — BROMFENAC 1.03 MG/ML
0.09 SOLUTION/ DROPS OPHTHALMIC DAILY
Qty: 1 | Refills: 1 | Status: DISCONTINUED | COMMUNITY
Start: 2017-07-26 | End: 2018-12-11

## 2018-12-11 RX ORDER — OMEGA-3/DHA/EPA/FISH OIL 35-113.5MG
1000 TABLET,CHEWABLE ORAL
Qty: 30 | Refills: 6 | Status: DISCONTINUED | COMMUNITY
Start: 2017-04-26 | End: 2018-12-11

## 2018-12-11 RX ORDER — ASPIRIN 81 MG/1
81 TABLET, CHEWABLE ORAL
Qty: 30 | Refills: 0 | Status: DISCONTINUED | COMMUNITY
Start: 2017-05-17 | End: 2018-12-11

## 2018-12-11 RX ORDER — GLIPIZIDE 5 MG/1
5 TABLET, FILM COATED, EXTENDED RELEASE ORAL
Qty: 90 | Refills: 2 | Status: DISCONTINUED | COMMUNITY
Start: 2018-10-30 | End: 2018-12-11

## 2018-12-11 RX ORDER — MELATONIN 2.5MG/10ML
600 LIQUID (ML) ORAL
Refills: 0 | Status: DISCONTINUED | COMMUNITY
Start: 2017-04-25 | End: 2018-12-11

## 2018-12-12 ENCOUNTER — TRANSCRIPTION ENCOUNTER (OUTPATIENT)
Age: 58
End: 2018-12-12

## 2018-12-14 NOTE — ED ADULT NURSE NOTE - CAS EDP DISCH DISPOSITION ADMI
Problem: Pain  Goal: #Acceptable pain level achieved/maintained at rest using NRS/Faces  This goal is used for patients who can self-report.  Acceptable means the level is at or below the identified comfort/function goal.  Outcome: Outcome Met, Continue evaluating goal progress toward completion   12/14/18 0405   Pain Evaluation at Rest   Pain Evaluation at Rest Pain level undetermined - sleeping/no report of pain       Problem: Pressure Injury, Risk for  Intervention: Initiate/maintain additional skin protection/pressure relieving interventions for at risk patients with potential/actual tissue perfusion compromise  Initiate fluid immersion therapy bed, prophylactic sacral tissue compression prevention dressing (Mepilex), oxygen tubing protector, mobility assist device, and protection/pressure/off-loading devices during high risk periods incuding prevention prior to a procedure/surgery.  Transition to other suitable mattress as soon as able to mobilize patient.   12/14/18 0200   OTHER   Skin Protection/Pressure Redistribution Devices Heel off loading with pillow(s)   Devices and Equipment   Specialty Mattress/Bed Mattress-Stage IV equivalent with pump (e.g. Accumax with pump)         Problem: VTE, Risk for  Intervention: # Maintain Mechanical VTE Prophylaxis at all times  Refusing FLORA's and SCD's      Problem: At Risk for Falls  Intervention: # Implement Fall Risk signage/clothing used by unit  Initiate unit-specific measures to identify patients who are at risk for falls   12/14/18 0405   OTHER   # Implement Fall Risk signage/clothing used by unit Done     Intervention: Risk for Fall Interventions (specify)  Selectively initiate interventions based on patient-specific fall risks. Document in  Associated Rows on Daily Cares.   12/14/18 0200   Risk for Falls Interventions   Patient's Personal Risk Factors Problems with walking or moving;Problems with using the bathroom   Mobility and Gait Measures Encourage personal  mobility support item use;Use gait belt   Toileting Schedule Toileting during night as needed   OTHER   Elimination Risk Interventions Utilize visual cues (e.g. yield sign);Urinal at bedside;Offer toileting with every interaction (patient able to identify need)   Safety Measures   Environmental Safety Measures Maintained Done   Patient Specific Safety Measures in Use Encourage personal sensory support item use;Safe lighting as appropriate       Goal: # Takes action to control fall-related risks  Outcome: Outcome Met, Continue evaluating goal progress toward completion   12/14/18 0405   Olivarez Fall Scale   Participates in Fall Prevention Activities Yes       Problem: Fluid Volume Excess, Risk for  Goal: # Absence of New Onset Dyspnea  Dyspnea greater than SOB with Activity may be indicator of fluid volume excess  Outcome: Outcome Met, Continue evaluating goal progress toward completion   12/14/18 9357   --RESPIRATORY--   Respiratory Pattern-Subjective Denies respiratory symptoms          CDU

## 2018-12-18 ENCOUNTER — RX RENEWAL (OUTPATIENT)
Age: 58
End: 2018-12-18

## 2018-12-24 LAB
ALBUMIN SERPL ELPH-MCNC: 4 G/DL
ALP BLD-CCNC: 99 U/L
ALT SERPL-CCNC: 19 U/L
ANION GAP SERPL CALC-SCNC: 13 MMOL/L
AST SERPL-CCNC: 16 U/L
BASOPHILS # BLD AUTO: 0.05 K/UL
BASOPHILS NFR BLD AUTO: 0.8 %
BILIRUB SERPL-MCNC: 0.2 MG/DL
BUN SERPL-MCNC: 46 MG/DL
CALCIUM SERPL-MCNC: 8.8 MG/DL
CHLORIDE SERPL-SCNC: 100 MMOL/L
CO2 SERPL-SCNC: 24 MMOL/L
CREAT SERPL-MCNC: 2 MG/DL
EOSINOPHIL # BLD AUTO: 0.56 K/UL
EOSINOPHIL NFR BLD AUTO: 9.4 %
GLUCOSE SERPL-MCNC: 401 MG/DL
HCT VFR BLD CALC: 34.9 %
HGB BLD-MCNC: 11.7 G/DL
IMM GRANULOCYTES NFR BLD AUTO: 0.2 %
LYMPHOCYTES # BLD AUTO: 1.95 K/UL
LYMPHOCYTES NFR BLD AUTO: 32.7 %
MAN DIFF?: NORMAL
MCHC RBC-ENTMCNC: 29.8 PG
MCHC RBC-ENTMCNC: 33.5 GM/DL
MCV RBC AUTO: 88.8 FL
MONOCYTES # BLD AUTO: 0.65 K/UL
MONOCYTES NFR BLD AUTO: 10.9 %
NEUTROPHILS # BLD AUTO: 2.75 K/UL
NEUTROPHILS NFR BLD AUTO: 46 %
PLATELET # BLD AUTO: 194 K/UL
POTASSIUM SERPL-SCNC: 3.8 MMOL/L
PROT SERPL-MCNC: 6.2 G/DL
RBC # BLD: 3.93 M/UL
RBC # FLD: 13.4 %
SODIUM SERPL-SCNC: 137 MMOL/L
URATE SERPL-MCNC: 11.4 MG/DL
WBC # FLD AUTO: 5.97 K/UL

## 2019-01-10 ENCOUNTER — RX RENEWAL (OUTPATIENT)
Age: 59
End: 2019-01-10

## 2019-01-10 ENCOUNTER — TRANSCRIPTION ENCOUNTER (OUTPATIENT)
Age: 59
End: 2019-01-10

## 2019-01-15 ENCOUNTER — APPOINTMENT (OUTPATIENT)
Dept: RHEUMATOLOGY | Facility: CLINIC | Age: 59
End: 2019-01-15

## 2019-01-24 ENCOUNTER — APPOINTMENT (OUTPATIENT)
Dept: NEPHROLOGY | Facility: CLINIC | Age: 59
End: 2019-01-24

## 2019-01-29 ENCOUNTER — TRANSCRIPTION ENCOUNTER (OUTPATIENT)
Age: 59
End: 2019-01-29

## 2019-02-11 ENCOUNTER — TRANSCRIPTION ENCOUNTER (OUTPATIENT)
Age: 59
End: 2019-02-11

## 2019-02-13 ENCOUNTER — RX RENEWAL (OUTPATIENT)
Age: 59
End: 2019-02-13

## 2019-02-13 ENCOUNTER — TRANSCRIPTION ENCOUNTER (OUTPATIENT)
Age: 59
End: 2019-02-13

## 2019-02-21 ENCOUNTER — RX RENEWAL (OUTPATIENT)
Age: 59
End: 2019-02-21

## 2019-02-21 ENCOUNTER — TRANSCRIPTION ENCOUNTER (OUTPATIENT)
Age: 59
End: 2019-02-21

## 2019-02-26 ENCOUNTER — TRANSCRIPTION ENCOUNTER (OUTPATIENT)
Age: 59
End: 2019-02-26

## 2019-03-11 ENCOUNTER — RX RENEWAL (OUTPATIENT)
Age: 59
End: 2019-03-11

## 2019-03-11 ENCOUNTER — TRANSCRIPTION ENCOUNTER (OUTPATIENT)
Age: 59
End: 2019-03-11

## 2019-03-11 RX ORDER — FLASH GLUCOSE SENSOR
KIT MISCELLANEOUS
Qty: 1 | Refills: 0 | Status: DISCONTINUED | COMMUNITY
Start: 2018-05-25 | End: 2019-03-11

## 2019-03-11 RX ORDER — FLASH GLUCOSE SENSOR
KIT MISCELLANEOUS
Qty: 9 | Refills: 3 | Status: DISCONTINUED | COMMUNITY
Start: 2018-05-25 | End: 2019-03-11

## 2019-03-13 ENCOUNTER — TRANSCRIPTION ENCOUNTER (OUTPATIENT)
Age: 59
End: 2019-03-13

## 2019-03-18 ENCOUNTER — RX RENEWAL (OUTPATIENT)
Age: 59
End: 2019-03-18

## 2019-03-18 ENCOUNTER — TRANSCRIPTION ENCOUNTER (OUTPATIENT)
Age: 59
End: 2019-03-18

## 2019-03-20 NOTE — ED ADULT NURSE NOTE - OBJECTIVE STATEMENT
none
pt received to room 13, AAOx3, c/o an episode of chest pain accompanied by shortness of breath last night and this morning which has since resolved. pt denies any pain at this time. respirations even and unlabored. pt w PMH- HTN, DM, Cardiac stents. negative stress test x few months ago. pt did not take his BP meds this morning. VS as noted, pt in NAD, 20G IV placed to rt. AC, labs sent, will continue to monitor pt.

## 2019-03-21 ENCOUNTER — APPOINTMENT (OUTPATIENT)
Dept: RHEUMATOLOGY | Facility: CLINIC | Age: 59
End: 2019-03-21
Payer: COMMERCIAL

## 2019-03-21 VITALS
TEMPERATURE: 98.8 F | DIASTOLIC BLOOD PRESSURE: 69 MMHG | BODY MASS INDEX: 29.19 KG/M2 | SYSTOLIC BLOOD PRESSURE: 109 MMHG | OXYGEN SATURATION: 97 % | HEART RATE: 80 BPM | WEIGHT: 186 LBS | HEIGHT: 67 IN

## 2019-03-21 DIAGNOSIS — M1A.9XX0 CHRONIC GOUT, UNSPECIFIED, W/OUT TOPHUS (TOPHI): ICD-10-CM

## 2019-03-21 PROCEDURE — 99214 OFFICE O/P EST MOD 30 MIN: CPT

## 2019-03-25 ENCOUNTER — RX RENEWAL (OUTPATIENT)
Age: 59
End: 2019-03-25

## 2019-03-25 LAB
ALBUMIN SERPL ELPH-MCNC: 4.3 G/DL
ALP BLD-CCNC: 81 U/L
ALT SERPL-CCNC: 10 U/L
ANION GAP SERPL CALC-SCNC: 17 MMOL/L
AST SERPL-CCNC: 13 U/L
BASOPHILS # BLD AUTO: 0.05 K/UL
BASOPHILS NFR BLD AUTO: 0.7 %
BILIRUB SERPL-MCNC: 0.3 MG/DL
BUN SERPL-MCNC: 55 MG/DL
CALCIUM SERPL-MCNC: 9.7 MG/DL
CHLORIDE SERPL-SCNC: 96 MMOL/L
CO2 SERPL-SCNC: 28 MMOL/L
CREAT SERPL-MCNC: 2.69 MG/DL
EOSINOPHIL # BLD AUTO: 0.26 K/UL
EOSINOPHIL NFR BLD AUTO: 3.5 %
GLUCOSE SERPL-MCNC: 180 MG/DL
HCT VFR BLD CALC: 34 %
HGB BLD-MCNC: 10.7 G/DL
IMM GRANULOCYTES NFR BLD AUTO: 0.4 %
LYMPHOCYTES # BLD AUTO: 2.48 K/UL
LYMPHOCYTES NFR BLD AUTO: 33.3 %
MAN DIFF?: NORMAL
MCHC RBC-ENTMCNC: 29.2 PG
MCHC RBC-ENTMCNC: 31.5 GM/DL
MCV RBC AUTO: 92.9 FL
MONOCYTES # BLD AUTO: 0.73 K/UL
MONOCYTES NFR BLD AUTO: 9.8 %
NEUTROPHILS # BLD AUTO: 3.89 K/UL
NEUTROPHILS NFR BLD AUTO: 52.3 %
PLATELET # BLD AUTO: 343 K/UL
POTASSIUM SERPL-SCNC: 4 MMOL/L
PROT SERPL-MCNC: 6.8 G/DL
RBC # BLD: 3.66 M/UL
RBC # FLD: 14.5 %
SODIUM SERPL-SCNC: 141 MMOL/L
URATE SERPL-MCNC: 18 MG/DL
WBC # FLD AUTO: 7.44 K/UL

## 2019-03-26 ENCOUNTER — TRANSCRIPTION ENCOUNTER (OUTPATIENT)
Age: 59
End: 2019-03-26

## 2019-04-09 ENCOUNTER — APPOINTMENT (OUTPATIENT)
Dept: NEPHROLOGY | Facility: CLINIC | Age: 59
End: 2019-04-09
Payer: COMMERCIAL

## 2019-04-09 VITALS
DIASTOLIC BLOOD PRESSURE: 63 MMHG | WEIGHT: 196.21 LBS | OXYGEN SATURATION: 97 % | BODY MASS INDEX: 30.73 KG/M2 | SYSTOLIC BLOOD PRESSURE: 114 MMHG | HEART RATE: 84 BPM

## 2019-04-09 DIAGNOSIS — N40.0 BENIGN PROSTATIC HYPERPLASIA WITHOUT LOWER URINARY TRACT SYMPMS: ICD-10-CM

## 2019-04-09 PROCEDURE — 99214 OFFICE O/P EST MOD 30 MIN: CPT

## 2019-04-09 RX ORDER — ERGOCALCIFEROL 1.25 MG/1
1.25 MG CAPSULE, LIQUID FILLED ORAL
Qty: 4 | Refills: 6 | Status: DISCONTINUED | COMMUNITY
Start: 2017-04-26 | End: 2019-04-09

## 2019-04-09 RX ORDER — LOSARTAN POTASSIUM 25 MG/1
25 TABLET, FILM COATED ORAL DAILY
Qty: 30 | Refills: 0 | Status: DISCONTINUED | COMMUNITY
Start: 2018-01-16 | End: 2019-04-09

## 2019-04-09 RX ORDER — COLCHICINE 0.6 MG/1
0.6 TABLET, FILM COATED ORAL DAILY
Qty: 30 | Refills: 2 | Status: DISCONTINUED | COMMUNITY
Start: 2018-04-27 | End: 2019-04-09

## 2019-04-09 NOTE — REVIEW OF SYSTEMS
[As Noted in HPI] : as noted in HPI [Negative] : Heme/Lymph [Lower Ext Edema] : lower extremity edema [FreeTextEntry5] : improved LE edema and leg tightness [de-identified] : poorly controlled DM

## 2019-04-09 NOTE — CONSULT LETTER
[Dear  ___] : Dear  [unfilled], [Please see my note below.] : Please see my note below. [( Thank you for referring [unfilled] for consultation for _____ )] : Thank you for referring [unfilled] for consultation for [unfilled] [Consult Closing:] : Thank you very much for allowing me to participate in the care of this patient.  If you have any questions, please do not hesitate to contact me. [Sincerely,] : Sincerely, [Dianne Patiño MD] : Dianne Patiño MD [Division of Nephrology] : Division of Nephrology [Courtesy Letter:] : I had the pleasure of seeing your patient, [unfilled], in my office today. [FreeTextEntry2] : Dr. Barragan

## 2019-04-09 NOTE — HISTORY OF PRESENT ILLNESS
[FreeTextEntry1] : 59-year-old [place] male, born and raised in [facility], moved to  35 years ago, currently works with [employer], with DM2 x 25 years with diabetic neuropathy and retinopathy, HTN x 1 year, CAD s/p cardiac stents, HF with cardiomems device in situ, BPH, history of nephrolithiasis, GERD, HLD, anemia, gout, Vit B12 deficinecy and Vit D deficiency, CKD and anemia, presents for follow up of CKD after almost an year. \par \par Pt. was last seen in May, 2018. During that visit, he was noted to have stable renal function and was advised to continue taking losartan. Pt. was supposed to RTC for follow up in 1 month, however was lost to follow up. He states that he had multiple admissions for SOB in the past year, and has been busy with his work. \par \par Currently, pt. complains of tightness in bilateral legs and imbalance intermittently. He is unsure when and why his losartan was discontinued, and never stopped taking PPI as was advised during last office visit. He also mentions that dose of lasix was recently increased by his cardiologist. He denies use of NSAIDs or herbal supplements.

## 2019-04-09 NOTE — ASSESSMENT
[FreeTextEntry1] : 1. LEYDA/ Stage 3 CKD/ Proteinuria : Pt. with CKD in the setting of long standing, poorly controlled DM with diabetic neuropathy and retinopathy, pt. most likely with underlying diabetic nephropathy (DN). Pt. with ?hypertensive nephrosclerosis in the setting of HTN. Renal sonogram perfomed in Jan, 2017 showed nonobstructing nephrolithiasis. HBSAg, HCV antibody, serum LORRAINE, DAVID, ANCA, anti-GBM, C3, C4, cryoglobulin levels were negative. \par -Last Scr stable increased to 2.7 on labs done on 3/21/19. Pt. with LEYDA On CKD vs CKD progression vs obstruction in the setting of ?BPH. \par -Advised to switch PPI to H2 blocker.\par -Check renal panel, urine lytes, UA and spot urine TP/CR today. \par -Check bladder and renal US. \par -Pt. advised to avoid nephrotoxins, NSAIDs and RCA.\par \par 2. HTN : BP readings stable. Pt. advised to continue with current antihypertensives, monitor his BP regularly at home and continue with Low salt diet. \par \par 3. Edema : in the setting of CKD and HF. Resolved at present per patient, currently on lasix per his cardiologist (being managed based on cardiomems readings). Advised to continue lasix and take salt and fluid restricted diet. Monitor weight daily. \par \par \par \par Follow up in 1 month

## 2019-04-10 LAB
ALBUMIN SERPL ELPH-MCNC: 4.2 G/DL
ANION GAP SERPL CALC-SCNC: 15 MMOL/L
APPEARANCE: CLEAR
BACTERIA: NEGATIVE
BILIRUBIN URINE: NEGATIVE
BLOOD URINE: NEGATIVE
BUN SERPL-MCNC: 85 MG/DL
CALCIUM SERPL-MCNC: 9.1 MG/DL
CHLORIDE SERPL-SCNC: 89 MMOL/L
CO2 SERPL-SCNC: 32 MMOL/L
COLOR: NORMAL
CREAT SERPL-MCNC: 3.59 MG/DL
CREAT SPEC-SCNC: 142 MG/DL
CREAT/PROT UR: 0.7 RATIO
GLUCOSE QUALITATIVE U: ABNORMAL
GLUCOSE SERPL-MCNC: 268 MG/DL
HYALINE CASTS: 0 /LPF
KETONES URINE: NEGATIVE
LEUKOCYTE ESTERASE URINE: NEGATIVE
MICROSCOPIC-UA: NORMAL
NITRITE URINE: NEGATIVE
PH URINE: 6
PHOSPHATE SERPL-MCNC: 4.6 MG/DL
POTASSIUM SERPL-SCNC: 4 MMOL/L
PROT UR-MCNC: 101 MG/DL
PROTEIN URINE: ABNORMAL
RED BLOOD CELLS URINE: 1 /HPF
SODIUM ?TM SUB UR QN: 25 MMOL/L
SODIUM SERPL-SCNC: 136 MMOL/L
SPECIFIC GRAVITY URINE: 1.01
SQUAMOUS EPITHELIAL CELLS: 1 /HPF
UROBILINOGEN URINE: NORMAL
WHITE BLOOD CELLS URINE: 1 /HPF

## 2019-05-07 ENCOUNTER — APPOINTMENT (OUTPATIENT)
Dept: RHEUMATOLOGY | Facility: CLINIC | Age: 59
End: 2019-05-07
Payer: COMMERCIAL

## 2019-05-07 VITALS
TEMPERATURE: 98.7 F | HEART RATE: 89 BPM | HEIGHT: 67 IN | OXYGEN SATURATION: 98 % | RESPIRATION RATE: 18 BRPM | WEIGHT: 196 LBS | SYSTOLIC BLOOD PRESSURE: 156 MMHG | DIASTOLIC BLOOD PRESSURE: 88 MMHG | BODY MASS INDEX: 30.76 KG/M2

## 2019-05-07 PROCEDURE — 99214 OFFICE O/P EST MOD 30 MIN: CPT

## 2019-05-10 LAB
ALBUMIN SERPL ELPH-MCNC: 3.5 G/DL
ALP BLD-CCNC: 148 U/L
ALT SERPL-CCNC: 37 U/L
ANION GAP SERPL CALC-SCNC: 19 MMOL/L
AST SERPL-CCNC: 23 U/L
BASOPHILS # BLD AUTO: 0.03 K/UL
BASOPHILS NFR BLD AUTO: 0.3 %
BILIRUB SERPL-MCNC: <0.2 MG/DL
BUN SERPL-MCNC: 55 MG/DL
CALCIUM SERPL-MCNC: 8.4 MG/DL
CHLORIDE SERPL-SCNC: 87 MMOL/L
CO2 SERPL-SCNC: 27 MMOL/L
CREAT SERPL-MCNC: 3.12 MG/DL
EOSINOPHIL # BLD AUTO: 0.14 K/UL
EOSINOPHIL NFR BLD AUTO: 1.4 %
ESTIMATED AVERAGE GLUCOSE: 229 MG/DL
GLUCOSE SERPL-MCNC: 317 MG/DL
HBA1C MFR BLD HPLC: 9.6 %
HCT VFR BLD CALC: 26.4 %
HGB BLD-MCNC: 8.5 G/DL
IMM GRANULOCYTES NFR BLD AUTO: 1.7 %
LYMPHOCYTES # BLD AUTO: 2.47 K/UL
LYMPHOCYTES NFR BLD AUTO: 24 %
MAN DIFF?: NORMAL
MCHC RBC-ENTMCNC: 28.7 PG
MCHC RBC-ENTMCNC: 32.2 GM/DL
MCV RBC AUTO: 89.2 FL
MONOCYTES # BLD AUTO: 1.29 K/UL
MONOCYTES NFR BLD AUTO: 12.5 %
NEUTROPHILS # BLD AUTO: 6.19 K/UL
NEUTROPHILS NFR BLD AUTO: 60.1 %
PLATELET # BLD AUTO: 349 K/UL
POTASSIUM SERPL-SCNC: 4.1 MMOL/L
PROT SERPL-MCNC: 6.6 G/DL
RBC # BLD: 2.96 M/UL
RBC # FLD: 13.4 %
SODIUM SERPL-SCNC: 133 MMOL/L
URATE SERPL-MCNC: 9.6 MG/DL
WBC # FLD AUTO: 10.29 K/UL

## 2019-06-14 NOTE — ED CDU PROVIDER SUBSEQUENT DAY NOTE - RESPIRATORY NEGATIVE STATEMENT, MLM
no chest pain, no cough, and no shortness of breath. <<----- Click to add NO significant Past Surgical History

## 2019-08-01 ENCOUNTER — TRANSCRIPTION ENCOUNTER (OUTPATIENT)
Age: 59
End: 2019-08-01

## 2019-08-07 ENCOUNTER — RX RENEWAL (OUTPATIENT)
Age: 59
End: 2019-08-07

## 2019-08-26 ENCOUNTER — APPOINTMENT (OUTPATIENT)
Dept: NEUROLOGY | Facility: CLINIC | Age: 59
End: 2019-08-26
Payer: COMMERCIAL

## 2019-08-26 VITALS
WEIGHT: 180 LBS | BODY MASS INDEX: 28.25 KG/M2 | DIASTOLIC BLOOD PRESSURE: 59 MMHG | HEART RATE: 80 BPM | SYSTOLIC BLOOD PRESSURE: 107 MMHG | HEIGHT: 67 IN

## 2019-08-26 PROCEDURE — 99214 OFFICE O/P EST MOD 30 MIN: CPT

## 2019-08-26 NOTE — ASSESSMENT
[FreeTextEntry1] : Patient has worsening polyneuropathy, now with distal ankle DF weakness, and worsening gait with falls. \par \par This is likely progression of diabetic neuropathy, but the rapidity of the decline concerns me and will get lab panel that he didn't do last visit and have him back for EMG

## 2019-08-26 NOTE — PHYSICAL EXAM
[Person] : oriented to person [Time] : oriented to time [Place] : oriented to place [Short Term Intact] : short term memory intact [Remote Intact] : remote memory intact [Concentration Intact] : normal concentrating ability [Registration Intact] : recent registration memory intact [Visual Intact] : visual attention was ~T not ~L decreased [Repeating Phrases] : no difficulty repeating a phrase [Naming Objects] : no difficulty naming common objects [Fluency] : fluency intact [Writing A Sentence] : no difficulty writing a sentence [Comprehension] : comprehension intact [Reading] : reading intact [Cranial Nerves Optic (II)] : visual acuity intact bilaterally,  visual fields full to confrontation, pupils equal round and reactive to light [Past History] : adequate knowledge of personal past history [Cranial Nerves Oculomotor (III)] : extraocular motion intact [Cranial Nerves Trigeminal (V)] : facial sensation intact symmetrically [Cranial Nerves Facial (VII)] : face symmetrical [Cranial Nerves Vestibulocochlear (VIII)] : hearing was intact bilaterally [Cranial Nerves Glossopharyngeal (IX)] : tongue and palate midline [Cranial Nerves Accessory (XI - Cranial And Spinal)] : head turning and shoulder shrug symmetric [Cranial Nerves Hypoglossal (XII)] : there was no tongue deviation with protrusion [Motor Tone] : muscle tone was normal in all four extremities [No Muscle Atrophy] : normal bulk in all four extremities [+4] : hip flexion +4/5 [4] : ankle dorsiflexion 4/5 [5] : ankle inversion 5/5 [Sensation Tactile Decrease] : light touch was intact [Romberg's Sign] : a positive Romberg's sign was present [Vibration Decrease Leg / Foot Both Ankles] : decreased at both ankles [Vibration Decrease Leg / Foot Toes Both Feet] : decreased at the toes of both feet  [Position Sensation Decrease Leg/Foot At Level Of Ankle] : impaired at the ankle in both legs [Position Sensation Decrease Leg/Foot At Level Of Toes] : impaired at the toes in both legs [1+] : Triceps left 1+ [0] : Ankle jerk left 0 [Hand Weakness Right] : normal hand  [Hand Weakness Left] : normal hand  [Past-pointing] : there was no past-pointing [Tremor] : no tremor present [Plantar Reflex Right Only] : normal on the right [Plantar Reflex Left Only] : normal on the left [FreeTextEntry6] : significant FDI bilat, tinel at elbows [FreeTextEntry8] : wide-based, cannot tandem

## 2019-08-26 NOTE — HISTORY OF PRESENT ILLNESS
[FreeTextEntry1] : Patient returns for f/u.  He did not get labs I ordered.  He states he has fallen from poor balance three times since last visit.  He continues to have sporadic tingling in the feet and unsteady gait, has to look at the ground.  States he would like me to fill out disability papers.  He works as a .  He also c/o numbness left D5 for about three months\par \par

## 2019-09-03 ENCOUNTER — RX RENEWAL (OUTPATIENT)
Age: 59
End: 2019-09-03

## 2019-09-06 ENCOUNTER — RX RENEWAL (OUTPATIENT)
Age: 59
End: 2019-09-06

## 2019-09-17 ENCOUNTER — APPOINTMENT (OUTPATIENT)
Dept: INTERNAL MEDICINE | Facility: CLINIC | Age: 59
End: 2019-09-17
Payer: COMMERCIAL

## 2019-09-17 VITALS
TEMPERATURE: 98.3 F | SYSTOLIC BLOOD PRESSURE: 120 MMHG | HEIGHT: 67 IN | DIASTOLIC BLOOD PRESSURE: 60 MMHG | BODY MASS INDEX: 27.62 KG/M2 | WEIGHT: 176 LBS | HEART RATE: 77 BPM | OXYGEN SATURATION: 98 %

## 2019-09-17 DIAGNOSIS — M1A.9XX0 CHRONIC GOUT, UNSPECIFIED, W/OUT TOPHUS (TOPHI): ICD-10-CM

## 2019-09-17 DIAGNOSIS — Z91.14 PATIENT'S OTHER NONCOMPLIANCE WITH MEDICATION REGIMEN: ICD-10-CM

## 2019-09-17 DIAGNOSIS — Z91.11 PATIENT'S NONCOMPLIANCE WITH DIETARY REGIMEN: ICD-10-CM

## 2019-09-17 PROCEDURE — 99214 OFFICE O/P EST MOD 30 MIN: CPT

## 2019-09-17 RX ORDER — CARVEDILOL 25 MG/1
25 TABLET, FILM COATED ORAL
Qty: 180 | Refills: 0 | Status: ACTIVE | COMMUNITY
Start: 2019-04-09

## 2019-09-17 NOTE — HISTORY OF PRESENT ILLNESS
[de-identified] : came in after 1 yr \par This is a 59 yrs old male with PMHX of chronic uncontrolled DM2 x 25 years with diabetic neuropathy and retinopathy, HTN, CAD s/p cardiac stents, HF with cardiomems device in situ, BPH, GERD, HLD, anemia, gout, Vit B12 deficinecy and Vit D deficiency, CKD and anemia, presents today for f/u \par \par was in hospital 3 x this year from CP to gout and shoulder pain \par last admission was 8/16/19 - [facility] - gout episode - was given colcrys 0.6 told to take 1/2 tab x 1 week \par - allopurinol was increased to 400 daily \par - saw rheum last 5/2019 \par \par CAD- \par -had few stents placed 6/2017 2 stents , had 2 stents placed in past \par - had cardiomes placed in 6/2017 \par -followed by cardio at [facility] Dr Ai Torres # [phone]- l\par -taking Brillanta 90, ASA 81, lipitor 80 , coreg 25 BID - isosorbide dinitrate 30 2 tabs TID \par -did nuclear stress test , and ECHO earlier this year \par - denies any cp , sob, feels tired easily , no palpitations\par \par DM- uncontrolled Glu today 519- as per pt took basal insulin last night 55 units ( cannot recall name ) and 15 unist humolog with breakfast and lunch - his lunch was rice -- last saw endo 12/2018 \par -with retinopathy , neuropathy , nephropathy - last eye exam 9/2018 -followed by endo and neuro ophtho \par -mainly upper 180-200's , no readings less than 140 , no hypoglycemic episode\par - seeing ophtho + diabetic retinopathy , DM neuropathy and nephropathy \par -seeing endocrinologist  now has appt 11/2019 \par -did not see diabetic educator \par + tingling in b/l feet saw podiatrist recommended gabapentin - 400 mg 3 tabs daily \par \par Hyperlipidemia- on lipitor 80 mg daily , \par

## 2019-09-17 NOTE — ASSESSMENT
[FreeTextEntry1] : h/o chronic gout now with acte flare b/l foot rt > left \par - continue allopurinol 300 now \par - low purine dies reviewed \par - rx for colcrys given \par -f/u  rheum \par \par B/l leg swelling - h/o CHF-discussed no salt diet \par -keep legs elevated - water restriction to 1.5 liter - on toresemide  40 BID  \par -compression stockings given \par \par DM- Uncontrolled associated with macrovascular and microvascular complications \par - persistent non compliant with medication and diet / appointments \par -advised pt to take 10 units humolog when he gets home today \par - to see endo and Diabetic wellness for educations and management \par - referral for care management given - multiple hospital admission high risk \par -educated risks of uncontrolled sugars - including renal failure cad, neuropathy, retinopathy, amputations , poor wound healing,Death for MI - pt verbalize and agrees to be compliant with diet and medications and appointments. \par - Monitor Accu-Cheks daily, ophthalmology followup, podiatry follow up. Monitor for signs of hypoglycemia. \par d/w pt to eat low carbohydrate 1800 kcal ADA diet, avoid rice, pasta, sugar, sweet, soda, juices, exercise daily walking 30 minutes and do resistance training twice a week.\par -keep appt to see endocrine given again , nutritionist \par \par Coronary artery disease, history of stents-\par -to see cardiology for clearance for colonoscope \par -- followed by cardio at [facility] \par \par Hypertension- Controlled, continue current medications, low sodium-DASH diet.Advised include 3-4 servings of vegetables.\par \par Hyperlipidemia-\par - atorvastatin 80 mg daily \par - check lipid levels and liver functions \par -Encouraged to eat a Low-fat diet, avoid red meat, cheese, butter, peanuts and exercise daily for 40 minutes\par \par Diabetic nephropathy- worsening with Cr > 3 \par - educated tighter control for sugars, cholesterol, and blood pressure, risk of end-stage renal disease requiring dialysis discussed with pt \par -followed by nephrologist\par \par HCM\par Colonoscope never - anemic - educated colon cancer risk referral to see gastro given , pt verbalized referral to do FIt and Gastro given \par Pneumovax 23 given 2017 \par flu vaccine got at pharmacy as per pt \par

## 2019-09-17 NOTE — REVIEW OF SYSTEMS
[Chest Pain] : chest pain [Cough] : cough [Lower Ext Edema] : lower extremity edema [Negative] : Gastrointestinal [FreeTextEntry6] : dry cough

## 2019-09-20 ENCOUNTER — RX RENEWAL (OUTPATIENT)
Age: 59
End: 2019-09-20

## 2019-09-28 NOTE — DISCHARGE NOTE ADULT - NS AS DC STROKE DX YN
Patient:   DENISE BLANCHARD            MRN: [id number]            FIN: [id number]               Age:   36 hours     Sex:  MALE     :  17   Associated Diagnoses:   None   Author:   SD LARA      Basic Information   Patient Information:       Growth parameters: Birth Measurements   17 08:48           Birth Weight              2.980 kg     .       Physical Examination   VS/Measurements   Measurements from flowsheet : Height and Weight   17 08:48 CLINICALWEIGHT 2.870 kg   17 00:00 CLINICALWEIGHT 2.870 kg    Weight Method Measured    Weight Scale Infant Scale         General:  No acute distress.    Eye:  Red reflex.    HENT:  Normocephalic, Anterior fontanelle open/soft/flat.    Neck:  Supple, Full range of motion.    Respiratory:  Lungs are clear to auscultation.    Cardiovascular:  Normal rate, Regular rhythm, No murmur, Good pulses equal in all extremities.    Gastrointestinal:  Non-distended.    Genitourinary:  Normal genitalia for age and sex.    Musculoskeletal     Normal range of motion.     Normal strength.     Integumentary:  Warm, Dry, Pink.    Neurologic:  Normal sensory, Moves all extremities appropriately.             Electronically Signed On 2017 08:43  __________________________________________________   SD LARA     no

## 2019-10-15 ENCOUNTER — RX RENEWAL (OUTPATIENT)
Age: 59
End: 2019-10-15

## 2019-10-17 ENCOUNTER — RX RENEWAL (OUTPATIENT)
Age: 59
End: 2019-10-17

## 2019-10-24 ENCOUNTER — APPOINTMENT (OUTPATIENT)
Dept: RHEUMATOLOGY | Facility: CLINIC | Age: 59
End: 2019-10-24

## 2019-10-28 ENCOUNTER — RX RENEWAL (OUTPATIENT)
Age: 59
End: 2019-10-28

## 2019-11-13 ENCOUNTER — TRANSCRIPTION ENCOUNTER (OUTPATIENT)
Age: 59
End: 2019-11-13

## 2019-11-19 ENCOUNTER — APPOINTMENT (OUTPATIENT)
Dept: ENDOCRINOLOGY | Facility: CLINIC | Age: 59
End: 2019-11-19
Payer: COMMERCIAL

## 2019-11-19 VITALS — OXYGEN SATURATION: 97 % | BODY MASS INDEX: 28.25 KG/M2 | HEART RATE: 100 BPM | WEIGHT: 180 LBS | HEIGHT: 67 IN

## 2019-11-19 PROCEDURE — 99214 OFFICE O/P EST MOD 30 MIN: CPT

## 2019-11-19 NOTE — PHYSICAL EXAM
[Well Nourished] : well nourished [Alert] : alert [No Acute Distress] : no acute distress [Well Developed] : well developed [Normal Sclera/Conjunctiva] : normal sclera/conjunctiva [EOMI] : extra ocular movement intact [Normal Oropharynx] : the oropharynx was normal [No Proptosis] : no proptosis [Thyroid Not Enlarged] : the thyroid was not enlarged [No Thyroid Nodules] : there were no palpable thyroid nodules [No Respiratory Distress] : no respiratory distress [No Accessory Muscle Use] : no accessory muscle use [Clear to Auscultation] : lungs were clear to auscultation bilaterally [Normal Rate] : heart rate was normal  [Normal S1, S2] : normal S1 and S2 [Regular Rhythm] : with a regular rhythm [Pedal Pulses Normal] : the pedal pulses are present [Normal Bowel Sounds] : normal bowel sounds [No Edema] : there was no peripheral edema [Soft] : abdomen soft [Not Tender] : non-tender [Not Distended] : not distended [Post Cervical Nodes] : posterior cervical nodes [Anterior Cervical Nodes] : anterior cervical nodes [Axillary Nodes] : axillary nodes [No Spinal Tenderness] : no spinal tenderness [Normal] : normal and non tender [Spine Straight] : spine straight [No Stigmata of Cushings Syndrome] : no stigmata of cushings syndrome [No Rash] : no rash [Normal Gait] : normal gait [Normal Strength/Tone] : muscle strength and tone were normal [Acanthosis Nigricans] : no acanthosis nigricans [Normal Reflexes] : deep tendon reflexes were 2+ and symmetric [No Tremors] : no tremors [Oriented x3] : oriented to person, place, and time [de-identified] : ambulate with cane  [de-identified] : Right ankle swollen, tender.

## 2019-11-19 NOTE — HISTORY OF PRESENT ILLNESS
[FreeTextEntry1] : Mr. ADAM KOWALSKI is a 59 year old male with history of type 2 DM, diagnosed 20 years ago. There is retinopathy, there is neuropathy, there is nephropathy. He is followed with nephrology. There is a history of CAD with 4 stents, he has CardioMEMS. There is no CVA, there is history of heart failure. He follows very closely with cardiologist, Dr. Jonathan Torres from [facility]. \par \par His initial DM regimen was: metformin 1000mg BID (stopped due to renal function) \par His current DM regimen is: Toujeo 55 units at bedtime Humalog 15 units tid with meals.  He was previously on SGLT2 but stopped due to renal function.  He is following up with nephrology.   He admits to missing meal time insulin dosing. 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  \par \par Current diabetes symptoms/problems include: Denies polyuria, polydipsia of blurry vision.  \par Patient reports in-adherence to prescribed medical regimen.  He reports poor adherence to meal time insulin.  He sometimes takes his meal time insulin 1-2 hour after eating.  He states that he forgot what his sliding scale was. \par \par He admits to high Carb diet.  (see below on 24 hour intake)  He had already met with nutritionists numerous times but states that it's hard to change his ways.  \par \par In regards to his DM health maintenance: \par Last visit to ophthalmology was: About 9 months ago. \par Last visit to podiatry was: Follows up regularly \par The patient is adherent to diabetic foot precautions:Yes\par Last A1c was: 12/19/2017 12% --> 12/11/2018 9.3% --> 11/19/2019 \par Last LDL was: Dec 2017 unable to calculate due to Triglyceride level in 600s, repeat was in 400s\par Last urine micro-albumin was:+ 2016, follows with renal\par Smoking history: never smoker \par  \par Regarding HDL, currently on atorvastatin 80mg daily, adherent with medication. NOT adherent with low fat diet.  He was also started on fenofiberate which he states he is adherent with today.  \par \par Regarding HTN, adherent wit his medication, on Beta-blocker, Lasix and isosorbide dinitrate and hydralazine

## 2019-11-19 NOTE — ASSESSMENT
[FreeTextEntry1] : Mr. ADAM KOWALSKI is a 59 year yo M here for evaluation and treatment of diabetes mellitus type II, poorly controlled with multiple microvascular and macrovascular complication. \par \par 1)Diabetes type 2 , poorly controlled 8.6% today. Improving but still needs further lowering to prevent worsening cardiovascular and microvascular complications.  \par -Increase Toujeo to 60 units at bedtime from 55 units.  \par -For Humalog, start 15unit TID with meals with sliding scale 1:50 above 150mg/dl.  I written out the scale for patient again.  If he missed his insulin fore more than 2 hours, to only use the correctional scale insulin instead of the meal time plus sliding scale insulin.  \par -Not a good candidate for GLP1 agonist due to high triglyceride levels. \par -Continue with Homer sensor but informed patient he should bring it to his visit or forward us his records for us to adjust his insulin regimen. \par \par 2)Blood Pressure 120/60\par Continue with his current antihypertensive management. \par Renal status:  urine microalbumin +, follows with renal\par \par 3)Cholesterol management\par High triglyceride level, >500mg/dl, will target glucose control first, if still elevated, consider adding on fenofibrate. \par He is currently on atorvastatin 80mg daily.\par Continue with fenofibrate \par Check fasting lipid panel \par \par Diabetes Health Care Maintenance\par - Opthalmology up to date: Yes\par -Podiatry up to date: Yes\par -Antiplatelet agent: Yes\par  -Urine microalbumin: Up to date \par -ACE-I/ARB: Yes\par -Patient to call for persistent glucose < 70 or > 300\par -Discussed hypoglycemic protocol.  \par -Yearly flu vaccine recommended \par \par EDUCATION: Reviewed ‘ABCs’ of diabetes management (respective goals in parentheses): A1C (<7), blood pressure (<140/90), and cholesterol (LDL <100).\par \par COMPLIANCE at present is estimated to be: poor. \par FOLLOW UP: I recommend that frequency of visits for diabetes care be every 3 month \par \par \par  [Carbohydrate Consistent Diet] : carbohydrate consistent diet [Hypoglycemia Management] : hypoglycemia management [Long Term Vascular Complications] : long term vascular complications of diabetes [Importance of Diet and Exercise] : importance of diet and exercise to improve glycemic control, achieve weight loss and improve cardiovascular health [Diabetes Foot Care] : diabetes foot care [Action and use of Insulin] : action and use of short and long-acting insulin [Self Monitoring of Blood Glucose] : self monitoring of blood glucose [Injection Technique, Storage, Sharps Disposal] : injection technique, storage, and sharps disposal [Retinopathy Screening] : Patient was referred to ophthalmology for retinopathy screening [Insulin Self-Administration] : insulin self-administration

## 2019-11-20 LAB
GLUCOSE BLDC GLUCOMTR-MCNC: 428
HBA1C MFR BLD HPLC: 8.9

## 2019-11-27 ENCOUNTER — APPOINTMENT (OUTPATIENT)
Dept: NEUROLOGY | Facility: CLINIC | Age: 59
End: 2019-11-27

## 2019-12-01 ENCOUNTER — RX RENEWAL (OUTPATIENT)
Age: 59
End: 2019-12-01

## 2019-12-01 RX ORDER — PANTOPRAZOLE 40 MG/1
40 TABLET, DELAYED RELEASE ORAL
Qty: 90 | Refills: 1 | Status: ACTIVE | COMMUNITY
Start: 2017-02-14 | End: 1900-01-01

## 2019-12-12 ENCOUNTER — RX RENEWAL (OUTPATIENT)
Age: 59
End: 2019-12-12

## 2019-12-19 ENCOUNTER — RX RENEWAL (OUTPATIENT)
Age: 59
End: 2019-12-19

## 2019-12-19 ENCOUNTER — TRANSCRIPTION ENCOUNTER (OUTPATIENT)
Age: 59
End: 2019-12-19

## 2020-01-14 ENCOUNTER — RX RENEWAL (OUTPATIENT)
Age: 60
End: 2020-01-14

## 2020-01-14 NOTE — CONSULT NOTE ADULT - SUBJECTIVE AND OBJECTIVE BOX
Call regarding: Patient is wondering if he needs any labs for his appt in Feb.  Pt would like a call if so    Okay to leave detailed voice mail?  Yes    Pharmacy information verified:  No       
Script eprescribed to pharmacy   
Patient is a 58y old  Male who presents with a chief complaint of     HPI: 57 YOM DM, HTN, CAD/stents, ICM, s/p cardiomems monitor placement, gout, CKD, hepatitis, p/w B/L foot swelling and pain for the past week.  Associated progressively worsening erythema, poor PO intake the past 4 days, and 2 episodes of emesis today. Pt decided to come ot the ED today because of decreased ROM secondary to pain and difficulty ambulating.  Pt denied fevers and chills, however he is noted to be febrile in triage.  No trauma, ulceration.  Pain similar to last gout exacerbation however it hs not gone away.      PAST MEDICAL & SURGICAL HISTORY:  Myocardial infarction  Pulmonary hypertension  ASHD (arteriosclerotic heart disease)  Ischemic cardiomyopathy  CKD (chronic kidney disease)  Hepatitis  Vitamin D deficiency  Nephrolithiasis  GERD (gastroesophageal reflux disease)  Diabetic retinopathy  Gout  HTN (hypertension)  s/p Angioplasty with Stent  Dyslipidemia  Diabetes Mellitus Type II, Uncontrolled  CHF with cardiomyopathy: Insertion of Cardiomems monitor  Diabetes with retinopathy: S/P PPV/PRP/GAS  OD 2/22/17 for viterous hemorrhage  H/O lithotripsy  History of eye surgery: left eye  S/P coronary artery stent placement: 2010 TRICIA to Diag ; 11/18/2010  LAD; 2/4/11 ATOM liberte Diag; 5/15/2017  TRICIA to 1st diag; 6/7/17 PCI with laser and high pressure balloon  Retinal Hemorrhage      MEDICATIONS  (STANDING):  vancomycin  IVPB 1000 milliGRAM(s) IV Intermittent once    MEDICATIONS  (PRN):      Allergies    No Known Allergies    Intolerances        VITALS:    Vital Signs Last 24 Hrs  T(C): 37.7 (12 Apr 2018 12:27), Max: 38.7 (12 Apr 2018 11:19)  T(F): 99.8 (12 Apr 2018 12:27), Max: 101.6 (12 Apr 2018 11:19)  HR: 80 (12 Apr 2018 12:27) (80 - 93)  BP: 168/86 (12 Apr 2018 12:27) (168/86 - 185/99)  BP(mean): --  RR: 17 (12 Apr 2018 12:27) (17 - 18)  SpO2: 99% (12 Apr 2018 12:27) (98% - 99%)    LABS:                          10.8   12.24 )-----------( 264      ( 12 Apr 2018 12:13 )             33.1       04-12    135  |  94<L>  |  24<H>  ----------------------------<  121<H>  3.9   |  25  |  1.71<H>    Ca    9.1      12 Apr 2018 12:13    TPro  8.1  /  Alb  3.3  /  TBili  0.6  /  DBili  x   /  AST  50<H>  /  ALT  32  /  AlkPhos  184<H>  04-12      CAPILLARY BLOOD GLUCOSE      POCT Blood Glucose.: 119 mg/dL (12 Apr 2018 11:34)      PT/INR - ( 12 Apr 2018 12:13 )   PT: 13.6 SEC;   INR: 1.22          PTT - ( 12 Apr 2018 12:13 )  PTT:35.6 SEC    LOWER EXTREMITY PHYSICAL EXAM:    Vascular: DP/PT 2/4, B/L, CFT <3 seconds B/L, Temperature gradient mildly warm to warm, B/L.   Neuro: Epicritic sensation diminished to the level of feet, B/L.  Musculoskeletal/Ortho: minimal pain w/ ROM to b/l ankle joints, first MPJ, pain elicited diffusely b/l w/ palpation to forefoot, no pain elicited w/ deep palpation to ankle  Skin: mild patchy erythema b/l no focal consolidation, no fluctuance, no open lesions, no acute SOI, b/l swelling no pitting edema      RADIOLOGY & ADDITIONAL STUDIES:

## 2020-01-22 ENCOUNTER — TRANSCRIPTION ENCOUNTER (OUTPATIENT)
Age: 60
End: 2020-01-22

## 2020-01-23 ENCOUNTER — TRANSCRIPTION ENCOUNTER (OUTPATIENT)
Age: 60
End: 2020-01-23

## 2020-02-06 ENCOUNTER — RX RENEWAL (OUTPATIENT)
Age: 60
End: 2020-02-06

## 2020-02-21 ENCOUNTER — APPOINTMENT (OUTPATIENT)
Dept: INTERNAL MEDICINE | Facility: CLINIC | Age: 60
End: 2020-02-21
Payer: COMMERCIAL

## 2020-02-21 ENCOUNTER — RESULT CHARGE (OUTPATIENT)
Age: 60
End: 2020-02-21

## 2020-02-21 ENCOUNTER — TRANSCRIPTION ENCOUNTER (OUTPATIENT)
Age: 60
End: 2020-02-21

## 2020-02-21 VITALS
WEIGHT: 190 LBS | SYSTOLIC BLOOD PRESSURE: 130 MMHG | TEMPERATURE: 98.2 F | OXYGEN SATURATION: 98 % | BODY MASS INDEX: 29.82 KG/M2 | HEART RATE: 103 BPM | HEIGHT: 67 IN | DIASTOLIC BLOOD PRESSURE: 90 MMHG

## 2020-02-21 DIAGNOSIS — I25.10 ATHEROSCLEROTIC HEART DISEASE OF NATIVE CORONARY ARTERY W/OUT ANGINA PECTORIS: ICD-10-CM

## 2020-02-21 PROCEDURE — 83036 HEMOGLOBIN GLYCOSYLATED A1C: CPT | Mod: QW

## 2020-02-21 PROCEDURE — 99214 OFFICE O/P EST MOD 30 MIN: CPT | Mod: 25

## 2020-02-21 PROCEDURE — 36415 COLL VENOUS BLD VENIPUNCTURE: CPT

## 2020-02-21 RX ORDER — LISINOPRIL 5 MG/1
5 TABLET ORAL
Qty: 90 | Refills: 0 | Status: DISCONTINUED | COMMUNITY
Start: 2019-09-17 | End: 2020-02-21

## 2020-02-21 NOTE — ASSESSMENT
[FreeTextEntry1] : DM-\par -poc AIC - 10.1 \par - Uncontrolled associated with macrovascular and microvascular complications \par - persistent non compliant with medication and diet / appointments \par -advised pt to make appt with endo call for dose adjustment \par - referral for care management given - multiple hospital admission high risk \par -educated risks of uncontrolled sugars - including renal failure cad, neuropathy, retinopathy, amputations , poor wound healing,Death for MI - pt verbalize and agrees to be compliant with diet and medications and appointments. \par - Monitor Accu-Cheks daily, ophthalmology followup, podiatry follow up. Monitor for signs of hypoglycemia. \par d/w pt to eat low carbohydrate 1800 kcal ADA diet, avoid rice, pasta, sugar, sweet, soda, juices, exercise daily walking 30 minutes and do resistance training twice a week.\par -keep appt to see endocrine given again , nutritionist \par \par Coronary artery disease, history of stents-\par -to see cardiology for clearance for colonoscope \par -- followed by cardio at [facility] \par \par Hypertension- Controlled, continue current medications, low sodium-DASH diet.Advised include 3-4 servings of vegetables.\par \par Hyperlipidemia-\par - atorvastatin 80 mg daily \par - check lipid levels and liver functions \par -Encouraged to eat a Low-fat diet, avoid red meat, cheese, butter, peanuts and exercise daily for 40 minutes\par \par Diabetic nephropathy- worsening with Cr > 3 \par - educated tighter control for sugars, cholesterol, and blood pressure, risk of end-stage renal disease requiring dialysis discussed with pt \par -followed by nephrologist referral given again \par \par h/o chronic gout now with acte flare b/l foot rt > left \par - continue allopurinol 300 now \par - low purine dies reviewed \par - rx for colcrys given \par -f/u rheum \par \par B/l leg swelling - h/o CHF-discussed no salt diet \par -keep legs elevated - water restriction to 1.5 liter - on toresemide 40 BID \par -compression stockings given\par \par HCM\par Colonoscope never - anemic - educated colon cancer risk referral to see gastro given , pt verbalized referral to do FIt and Gastro given \par Pneumovax 23 given 2017 \par flu vaccine got at pharmacy as per pt - 2019 \par . \par

## 2020-02-21 NOTE — HISTORY OF PRESENT ILLNESS
[de-identified] : This is a 59 yrs old male with PMHX of chronic uncontrolled DM2 x 25 years with diabetic neuropathy and retinopathy, HTN, CAD s/p cardiac stents, HF with cardiomems device in situ, BPH, GERD, HLD, anemia, gout, Vit B12 deficinecy and Vit D deficiency, CKD and anemia, presents today for f/u \par \par CAD- has not seen cardio in a while > 6 months \par -had few stents placed 6/2017 2 stents , had 2 stents placed in past \par - had cardiomes placed in 6/2017 \par -followed by cardio at [facility] Dr Ai Torres # [phone]- l\par -taking Brillanta 90, ASA 81, lipitor 80 , coreg 25 BID - isosorbide dinitrate 30 2 tabs TID \par -did nuclear stress test , and ECHO 2019 \par - denies any cp , sob, feels tired easily , no palpitations\par \par DM- uncontrolled \par - as per pt took basal insulin last night 55 units and 15 unit Humalog with breakfast , lunch, dinner  \par -with retinopathy , neuropathy , nephropathy - last eye exam 9/2018 -followed by endo and neuro ophtho \par -mainly upper 160-170 , high after meals in 200 , occ hypoglycemic episode when sugar was 60 \par - seeing Ophth + diabetic retinopathy , DM neuropathy and nephropathy \par -seeing endocrinologist  now  \par + tingling in b/l feet saw podiatrist recommended gabapentin - 400 mg 3 tabs daily \par \par Hyperlipidemia- on Lipitor 80 mg daily , \par \par gout - on allopurinol \par

## 2020-02-24 ENCOUNTER — TRANSCRIPTION ENCOUNTER (OUTPATIENT)
Age: 60
End: 2020-02-24

## 2020-02-24 LAB
ALBUMIN SERPL ELPH-MCNC: 4.1 G/DL
ANION GAP SERPL CALC-SCNC: 15 MMOL/L
BUN SERPL-MCNC: 44 MG/DL
CALCIUM SERPL-MCNC: 8.7 MG/DL
CHLORIDE SERPL-SCNC: 91 MMOL/L
CHOLEST SERPL-MCNC: 216 MG/DL
CHOLEST/HDLC SERPL: 6.4 RATIO
CO2 SERPL-SCNC: 28 MMOL/L
CREAT SERPL-MCNC: 2.16 MG/DL
GLUCOSE SERPL-MCNC: 452 MG/DL
HDLC SERPL-MCNC: 34 MG/DL
LDLC SERPL CALC-MCNC: NORMAL MG/DL
PHOSPHATE SERPL-MCNC: 4.2 MG/DL
POTASSIUM SERPL-SCNC: 3.6 MMOL/L
SODIUM SERPL-SCNC: 134 MMOL/L
TRIGL SERPL-MCNC: 1048 MG/DL

## 2020-02-27 ENCOUNTER — TRANSCRIPTION ENCOUNTER (OUTPATIENT)
Age: 60
End: 2020-02-27

## 2020-03-03 ENCOUNTER — APPOINTMENT (OUTPATIENT)
Dept: NEPHROLOGY | Facility: CLINIC | Age: 60
End: 2020-03-03
Payer: COMMERCIAL

## 2020-03-03 VITALS
BODY MASS INDEX: 29.76 KG/M2 | SYSTOLIC BLOOD PRESSURE: 165 MMHG | DIASTOLIC BLOOD PRESSURE: 97 MMHG | HEIGHT: 67 IN | WEIGHT: 189.59 LBS | HEART RATE: 100 BPM | OXYGEN SATURATION: 95 %

## 2020-03-03 DIAGNOSIS — E87.1 HYPO-OSMOLALITY AND HYPONATREMIA: ICD-10-CM

## 2020-03-03 DIAGNOSIS — D64.9 ANEMIA, UNSPECIFIED: ICD-10-CM

## 2020-03-03 DIAGNOSIS — N18.3 CHRONIC KIDNEY DISEASE, STAGE 3 (MODERATE): ICD-10-CM

## 2020-03-03 DIAGNOSIS — N17.9 ACUTE KIDNEY FAILURE, UNSPECIFIED: ICD-10-CM

## 2020-03-03 DIAGNOSIS — R60.0 LOCALIZED EDEMA: ICD-10-CM

## 2020-03-03 DIAGNOSIS — R80.9 PROTEINURIA, UNSPECIFIED: ICD-10-CM

## 2020-03-03 PROCEDURE — 99214 OFFICE O/P EST MOD 30 MIN: CPT

## 2020-03-03 NOTE — ASSESSMENT
[FreeTextEntry1] : 1. LEYDA/ Stage 3 CKD/ Proteinuria : Pt. with CKD in the setting of long standing, poorly controlled DM with diabetic neuropathy and retinopathy, pt. most likely with underlying diabetic nephropathy (DN). Pt. with ?hypertensive nephrosclerosis in the setting of HTN. Renal sonogram perfomed in Jan, 2017 showed nonobstructing nephrolithiasis. HBSAg, HCV antibody, serum LORRAINE, DAVID, ANCA, anti-GBM, C3, C4, cryoglobulin levels were negative. \par -Last Scr improved to 2.1\par -Advised to switch PPI to H2 blocker.\par -continue losartan\par -Check UA and spot urine TP/CR today. \par -Check bladder and renal US. \par -Pt. advised to avoid nephrotoxins, NSAIDs and RCA.\par \par 2. HTN : BP readings elevated. Pt. advised to increase losartan to 50mg PO daily, monitor his BP regularly at home and continue with Low salt diet. \par \par 3. Edema : in the setting of CKD and HF. Resolved at present, currently on lasix per his cardiologist (being managed based on cardiomems readings). Advised to continue lasix and take salt and fluid restricted diet. Monitor weight daily. \par \par \par Labs in 2 weeks\par Follow up pending review of lab results

## 2020-03-03 NOTE — REVIEW OF SYSTEMS
[Lower Ext Edema] : lower extremity edema [As Noted in HPI] : as noted in HPI [Negative] : Heme/Lymph [FreeTextEntry5] : improved LE edema and leg tightness [de-identified] : poorly controlled DM

## 2020-03-03 NOTE — CONSULT LETTER
[Dear  ___] : Dear  [unfilled], [( Thank you for referring [unfilled] for consultation for _____ )] : Thank you for referring [unfilled] for consultation for [unfilled] [Courtesy Letter:] : I had the pleasure of seeing your patient, [unfilled], in my office today. [Please see my note below.] : Please see my note below. [Consult Closing:] : Thank you very much for allowing me to participate in the care of this patient.  If you have any questions, please do not hesitate to contact me. [Sincerely,] : Sincerely, [FreeTextEntry2] : Dr. Barragan [Dianne Patiño MD] : Dianne Patiño MD [Division of Nephrology] : Division of Nephrology

## 2020-03-03 NOTE — PHYSICAL EXAM
[General Appearance - Alert] : alert [General Appearance - In No Acute Distress] : in no acute distress [General Appearance - Well Nourished] : well nourished [General Appearance - Well Developed] : well developed [General Appearance - Well-Appearing] : healthy appearing [Sclera] : the sclera and conjunctiva were normal [Outer Ear] : the ears and nose were normal in appearance [Neck Appearance] : the appearance of the neck was normal [] : no respiratory distress [Exaggerated Use Of Accessory Muscles For Inspiration] : no accessory muscle use [Auscultation Breath Sounds / Voice Sounds] : lungs were clear to auscultation bilaterally [Heart Sounds Gallop] : no gallops [Heart Sounds] : normal S1 and S2 [Apical Impulse] : the apical impulse was normal [Murmurs] : no murmurs [Edema] : there was no peripheral edema [Skin Color & Pigmentation] : normal skin color and pigmentation [FreeTextEntry1] : left hand with carpal tunnel syndrome [Mood] : the mood was normal [Affect] : the affect was normal [Oriented To Time, Place, And Person] : oriented to person, place, and time

## 2020-03-04 LAB
CREAT SPEC-SCNC: 78 MG/DL
CREAT/PROT UR: 3.5 RATIO
PROT UR-MCNC: 271 MG/DL

## 2020-03-13 ENCOUNTER — TRANSCRIPTION ENCOUNTER (OUTPATIENT)
Age: 60
End: 2020-03-13

## 2020-03-23 ENCOUNTER — APPOINTMENT (OUTPATIENT)
Dept: ENDOCRINOLOGY | Facility: CLINIC | Age: 60
End: 2020-03-23

## 2020-05-06 ENCOUNTER — TRANSCRIPTION ENCOUNTER (OUTPATIENT)
Age: 60
End: 2020-05-06

## 2020-05-08 ENCOUNTER — TRANSCRIPTION ENCOUNTER (OUTPATIENT)
Age: 60
End: 2020-05-08

## 2020-05-11 ENCOUNTER — TRANSCRIPTION ENCOUNTER (OUTPATIENT)
Age: 60
End: 2020-05-11

## 2020-06-02 ENCOUNTER — TRANSCRIPTION ENCOUNTER (OUTPATIENT)
Age: 60
End: 2020-06-02

## 2020-08-31 ENCOUNTER — TRANSCRIPTION ENCOUNTER (OUTPATIENT)
Age: 60
End: 2020-08-31

## 2020-09-29 ENCOUNTER — TRANSCRIPTION ENCOUNTER (OUTPATIENT)
Age: 60
End: 2020-09-29

## 2020-09-30 ENCOUNTER — TRANSCRIPTION ENCOUNTER (OUTPATIENT)
Age: 60
End: 2020-09-30

## 2020-10-06 ENCOUNTER — TRANSCRIPTION ENCOUNTER (OUTPATIENT)
Age: 60
End: 2020-10-06

## 2020-10-30 NOTE — ED PROVIDER NOTE - CROS ED RESP ALL NEG
negative... Clindamycin Counseling: I counseled the patient regarding use of clindamycin as an antibiotic for prophylactic and/or therapeutic purposes. Clindamycin is active against numerous classes of bacteria, including skin bacteria. Side effects may include nausea, diarrhea, gastrointestinal upset, rash, hives, yeast infections, and in rare cases, colitis.

## 2020-12-02 ENCOUNTER — TRANSCRIPTION ENCOUNTER (OUTPATIENT)
Age: 60
End: 2020-12-02

## 2020-12-23 ENCOUNTER — TRANSCRIPTION ENCOUNTER (OUTPATIENT)
Age: 60
End: 2020-12-23

## 2021-01-19 ENCOUNTER — TRANSCRIPTION ENCOUNTER (OUTPATIENT)
Age: 61
End: 2021-01-19

## 2021-02-17 NOTE — ED CDU PROVIDER SUBSEQUENT DAY NOTE - MARITAL STATUS
Single Composite Graft Text: The defect edges were debeveled with a #15 scalpel blade.  Given the location of the defect, shape of the defect, the proximity to free margins and the fact the defect was full thickness a composite graft was deemed most appropriate.  The defect was outline and then transferred to the donor site.  A full thickness graft was then excised from the donor site. The graft was then placed in the primary defect, oriented appropriately and then sutured into place.  The secondary defect was then repaired using a primary closure.

## 2021-02-22 ENCOUNTER — RX RENEWAL (OUTPATIENT)
Age: 61
End: 2021-02-22

## 2021-03-04 ENCOUNTER — TRANSCRIPTION ENCOUNTER (OUTPATIENT)
Age: 61
End: 2021-03-04

## 2021-03-31 ENCOUNTER — LABORATORY RESULT (OUTPATIENT)
Age: 61
End: 2021-03-31

## 2021-03-31 ENCOUNTER — APPOINTMENT (OUTPATIENT)
Dept: INTERNAL MEDICINE | Facility: CLINIC | Age: 61
End: 2021-03-31
Payer: COMMERCIAL

## 2021-03-31 VITALS
HEIGHT: 67 IN | TEMPERATURE: 98 F | OXYGEN SATURATION: 96 % | HEART RATE: 93 BPM | BODY MASS INDEX: 29.51 KG/M2 | SYSTOLIC BLOOD PRESSURE: 140 MMHG | WEIGHT: 188 LBS | DIASTOLIC BLOOD PRESSURE: 80 MMHG

## 2021-03-31 DIAGNOSIS — Z00.00 ENCOUNTER FOR GENERAL ADULT MEDICAL EXAMINATION W/OUT ABNORMAL FINDINGS: ICD-10-CM

## 2021-03-31 LAB — HBA1C MFR BLD HPLC: 13.8

## 2021-03-31 PROCEDURE — 99396 PREV VISIT EST AGE 40-64: CPT | Mod: 25

## 2021-03-31 PROCEDURE — 99072 ADDL SUPL MATRL&STAF TM PHE: CPT

## 2021-03-31 PROCEDURE — 83036 HEMOGLOBIN GLYCOSYLATED A1C: CPT | Mod: QW

## 2021-03-31 PROCEDURE — G0444 DEPRESSION SCREEN ANNUAL: CPT

## 2021-03-31 PROCEDURE — G0442 ANNUAL ALCOHOL SCREEN 15 MIN: CPT

## 2021-03-31 PROCEDURE — 36415 COLL VENOUS BLD VENIPUNCTURE: CPT

## 2021-03-31 RX ORDER — GABAPENTIN 300 MG/1
300 CAPSULE ORAL
Qty: 180 | Refills: 2 | Status: ACTIVE | COMMUNITY
Start: 2021-03-31

## 2021-03-31 NOTE — HEALTH RISK ASSESSMENT
[Fair] : ~his/her~ current health as fair  [No] : No [No falls in past year] : Patient reported no falls in the past year [0] : 2) Feeling down, depressed, or hopeless: Not at all (0) [With Significant Other] : lives with significant other [Retired] : retired [Fully functional (bathing, dressing, toileting, transferring, walking, feeding)] : Fully functional (bathing, dressing, toileting, transferring, walking, feeding) [] :  [Fully functional (using the telephone, shopping, preparing meals, housekeeping, doing laundry, using] : Fully functional and needs no help or supervision to perform IADLs (using the telephone, shopping, preparing meals, housekeeping, doing laundry, using transportation, managing medications and managing finances) [] : No [Audit-CScore] : 0 [de-identified] : walking with walker  [[id number]] : 0 [Reports changes in hearing] : Reports no changes in hearing [Reports changes in vision] : Reports no changes in vision [Reports changes in dental health] : Reports no changes in dental health

## 2021-03-31 NOTE — REVIEW OF SYSTEMS
[Lower Ext Edema] : lower extremity edema [Dyspnea on Exertion] : dyspnea on exertion [Negative] : Heme/Lymph [Vision Problems] : no vision problems [Chest Pain] : no chest pain [Suicidal] : not suicidal [Insomnia] : no insomnia [Depression] : no depression [FreeTextEntry9] : neuropathy legs

## 2021-03-31 NOTE — HISTORY OF PRESENT ILLNESS
[de-identified] : last seen as f/u 2/2020 \par last CPE 2017 \par non compliant with appointments , medications \par \par This is a 60 yrs old male with PMHX of chronic uncontrolled DM2 x 25 years with diabetic neuropathy , Nephropathy and retinopathy, HTN, CAD s/p cardiac stents, HF with cardiomems device in situ, BPH, GERD, HLD, anemia, gout, Vit B12 deficinecy and Vit D deficiency, CKD and anemia, presents today for annual check up \par \par CAD- saw cardio 3/2021 2 weeks ago - ordered nuclear stress test \par -had few stents placed 6/2017 2 stents , had 2 stents placed in past \par - had cardiomes placed in 6/2017 \par -followed by cardio at [facility] Dr Ai Torres # [phone]- l\par -taking Brillanta 90, ASA 81, lipitor 80 , coreg 25 BID - isosorbide dinitrate 30 2 tabs TID \par -did nuclear stress test , and ECHO 2019 \par - denies any cp , sob, feels tired easily , no palpitations\par \par DM- uncontrolled , has not followed up with his endo since 2019 \par - as per pt took basal insulin toujoe last night 56 units and 15 unit Humalog with breakfast , lunch, dinner \par -with retinopathy , neuropathy , nephropathy - last eye exam 9/2018 -followed by endo and neuro ophtho \par -mainly FBS in 200 , in 300s after meals  , no hypoglycemic episode  \par - seeing Ophth + diabetic retinopathy , DM neuropathy and nephropathy \par -seeing endocrinologist  now \par + tingling in b/l feet saw podiatrist recommended gabapentin - 400 mg 3 tabs daily \par \par Hyperlipidemia- on Lipitor 80 mg daily , \par \par gout - on allopurinol \par

## 2021-03-31 NOTE — ASSESSMENT
[FreeTextEntry1] : DM-\par -poc AIC - 13.8 \par - Uncontrolled associated with macrovascular and microvascular complications \par - persistent non compliant with medication and diet / appointments \par -advised pt to make appt with endo call for dose adjustment \par - multiple hospital admission high risk \par -educated risks of uncontrolled sugars - including renal failure cad, neuropathy, retinopathy, amputations , poor wound healing,Death for MI - pt verbalize and agrees to be compliant with diet and medications and appointments. \par - Monitor Accu-Cheks daily, ophthalmology followup, podiatry follow up. Monitor for signs of hypoglycemia. \par d/w pt to eat low carbohydrate 1800 kcal ADA diet, avoid rice, pasta, sugar, sweet, soda, juices, exercise daily walking 30 minutes and do resistance training twice a week.\par -keep appt to see endocrine given again , nutritionist \par \par Coronary artery disease, history of stents-\par -to see cardiology for clearance for colonoscope \par -- followed by cardio at [facility] saw 2 weeks ago due to get stress test  \par \par Hypertension- Controlled, continue current medications, low sodium-DASH diet.Advised include 3-4 servings of vegetables.\par \par Hyperlipidemia-\par - atorvastatin 80 mg daily \par - check lipid levels and liver functions \par -Encouraged to eat a Low-fat diet, avoid red meat, cheese, butter, peanuts and exercise daily for 40 minutes\par \par Diabetic nephropathy- worsening with Cr > 3 \par - educated tighter control for sugars, cholesterol, and blood pressure, risk of end-stage renal disease requiring dialysis discussed with pt \par -followed by nephrologist referral given again \par \par h/o chronic gout now with acte flare b/l foot rt > left \par - continue allopurinol 300 now \par - low purine dies reviewed \par - rx for colcrys given \par -f/u rheum \par \par B/l leg swelling -better \par - h/o CHF-discussed no salt diet \par -keep legs elevated - water restriction to 1.5 liter - on toresemide 40 BID \par -compression stockings given\par \par HCM\par Colonoscope never - anemic - educated colon cancer risk referral to see gastro given , pt verbalized referral to do FIt and Gastro given \par Pneumovax 23 given 2017 \par flu vaccine got at pharmacy as per pt - 2019 \par got pfizer covid 3/9/21, 3/30/21

## 2021-04-01 LAB
25(OH)D3 SERPL-MCNC: 26.6 NG/ML
ALBUMIN SERPL ELPH-MCNC: 3.2 G/DL
ALP BLD-CCNC: 126 U/L
ALT SERPL-CCNC: 18 U/L
ANION GAP SERPL CALC-SCNC: 17 MMOL/L
APPEARANCE: CLEAR
AST SERPL-CCNC: 24 U/L
BASOPHILS # BLD AUTO: 0.05 K/UL
BASOPHILS NFR BLD AUTO: 0.7 %
BILIRUB SERPL-MCNC: 0.2 MG/DL
BILIRUBIN URINE: NEGATIVE
BLOOD URINE: NEGATIVE
BUN SERPL-MCNC: 34 MG/DL
CALCIUM SERPL-MCNC: 8.3 MG/DL
CHLORIDE SERPL-SCNC: 102 MMOL/L
CHOLEST SERPL-MCNC: 242 MG/DL
CO2 SERPL-SCNC: 20 MMOL/L
COLOR: COLORLESS
CREAT SERPL-MCNC: 2.48 MG/DL
EOSINOPHIL # BLD AUTO: 0.48 K/UL
EOSINOPHIL NFR BLD AUTO: 6.3 %
GLUCOSE QUALITATIVE U: ABNORMAL
GLUCOSE SERPL-MCNC: 210 MG/DL
HCT VFR BLD CALC: 43 %
HDLC SERPL-MCNC: 37 MG/DL
HGB BLD-MCNC: 13.8 G/DL
IMM GRANULOCYTES NFR BLD AUTO: 0.4 %
KETONES URINE: NEGATIVE
LDLC SERPL CALC-MCNC: NORMAL MG/DL
LEUKOCYTE ESTERASE URINE: NEGATIVE
LYMPHOCYTES # BLD AUTO: 1.73 K/UL
LYMPHOCYTES NFR BLD AUTO: 22.7 %
MAN DIFF?: NORMAL
MCHC RBC-ENTMCNC: 29.6 PG
MCHC RBC-ENTMCNC: 32.1 GM/DL
MCV RBC AUTO: 92.1 FL
MONOCYTES # BLD AUTO: 0.66 K/UL
MONOCYTES NFR BLD AUTO: 8.7 %
NEUTROPHILS # BLD AUTO: 4.66 K/UL
NEUTROPHILS NFR BLD AUTO: 61.2 %
NITRITE URINE: NEGATIVE
NONHDLC SERPL-MCNC: 205 MG/DL
PH URINE: 6
PLATELET # BLD AUTO: 227 K/UL
POTASSIUM SERPL-SCNC: 3.9 MMOL/L
PROT SERPL-MCNC: 5.8 G/DL
PROTEIN URINE: ABNORMAL
RBC # BLD: 4.67 M/UL
RBC # FLD: 14 %
SODIUM SERPL-SCNC: 139 MMOL/L
SPECIFIC GRAVITY URINE: 1.01
TRIGL SERPL-MCNC: 481 MG/DL
TSH SERPL-ACNC: 3.52 UIU/ML
UROBILINOGEN URINE: NORMAL
VIT B12 SERPL-MCNC: 594 PG/ML
WBC # FLD AUTO: 7.61 K/UL

## 2021-04-09 ENCOUNTER — TRANSCRIPTION ENCOUNTER (OUTPATIENT)
Age: 61
End: 2021-04-09

## 2021-04-09 RX ORDER — INSULIN LISPRO 100 [IU]/ML
100 INJECTION, SOLUTION INTRAVENOUS; SUBCUTANEOUS
Qty: 3 | Refills: 5 | Status: DISCONTINUED | COMMUNITY
Start: 2018-01-16 | End: 2021-04-09

## 2021-04-20 ENCOUNTER — NON-APPOINTMENT (OUTPATIENT)
Age: 61
End: 2021-04-20

## 2021-04-30 ENCOUNTER — TRANSCRIPTION ENCOUNTER (OUTPATIENT)
Age: 61
End: 2021-04-30

## 2021-07-20 ENCOUNTER — NON-APPOINTMENT (OUTPATIENT)
Age: 61
End: 2021-07-20

## 2021-07-28 NOTE — ED PROVIDER NOTE - ATTENDING CONTRIBUTION TO CARE
Size Of Lesion After Curettage: 1.2 59 y/o M with h/o HTN, hyperlipidemia, DM (with neuropathy, retinopathy, nephropathy), CAD with stents, CHF with cardiomems, CKD, gout here with joint pains.  Pt reports 2 weeks of worsening pain to right great toe and foot, left hand.  He states he was diagnosed with gout a few months ago and treated with colchicine, but has since ran out.  No h/o trauma or fall.  Denies any HA, uri sxs, rash, cp, sob, back pain, abd pain, n/v/d, dysuria.  No recent travel, known sick contacts, or illnesses.  Well appearing, sitting comfortably in stretcher, awake and alert, nontoxic.  Febrile, normotensive, VSS.  NCAT, EOMI, PERRL.  Neck is supple.  Lungs cta bl.  Cards nl S1/S2, RRR, no MRG.  Abd soft ntnd.  No CVA tenderness.  No pedal edema or calf tenderness.  (+)swelling and tenderness to left 3rd PIP, but pt with FROM, no fusiform swelling of the digit, neurovascularly intact.  (+)tenderness to right 1st metatarsal with swelling and mild overlying redness, also tender and red to medial right foot; no fluctuance or crepitus; right ankle is intact no effusion redness or tenderness.  Plan for labs, urine, xr, pain control, ivfs, will start abx given fever.  Joint pains and swelling most likely gout flare, unlikely septic arthritis given multiple joint involvement / pt with good rom / and pt with gouty stigmata (podagra/tophi), poss early cellulitic changes, will also eval for pna/uti, consider cdu for iv abx and reassessment. 04-Apr-2017

## 2021-08-19 ENCOUNTER — NON-APPOINTMENT (OUTPATIENT)
Age: 61
End: 2021-08-19

## 2021-11-01 NOTE — ED ADULT NURSE NOTE - NS ED NURSE REPORT GIVEN TO FT
SELVIN Haywood Benzoyl Peroxide Counseling: Patient counseled that medicine may cause skin irritation and bleach clothing.  In the event of skin irritation, the patient was advised to reduce the amount of the drug applied or use it less frequently.   The patient verbalized understanding of the proper use and possible adverse effects of benzoyl peroxide.  All of the patient's questions and concerns were addressed.

## 2021-12-06 ENCOUNTER — INPATIENT (INPATIENT)
Facility: HOSPITAL | Age: 61
LOS: 2 days | Discharge: HOME HEALTH | DRG: 292 | End: 2021-12-09
Attending: INTERNAL MEDICINE | Admitting: HOSPITALIST
Payer: COMMERCIAL

## 2021-12-06 VITALS
TEMPERATURE: 98 F | WEIGHT: 179.9 LBS | OXYGEN SATURATION: 98 % | HEART RATE: 83 BPM | SYSTOLIC BLOOD PRESSURE: 119 MMHG | RESPIRATION RATE: 16 BRPM | DIASTOLIC BLOOD PRESSURE: 77 MMHG | HEIGHT: 67 IN

## 2021-12-06 DIAGNOSIS — E11.319 TYPE 2 DIABETES MELLITUS WITH UNSPECIFIED DIABETIC RETINOPATHY WITHOUT MACULAR EDEMA: Chronic | ICD-10-CM

## 2021-12-06 DIAGNOSIS — Z95.5 PRESENCE OF CORONARY ANGIOPLASTY IMPLANT AND GRAFT: Chronic | ICD-10-CM

## 2021-12-06 DIAGNOSIS — Z29.9 ENCOUNTER FOR PROPHYLACTIC MEASURES, UNSPECIFIED: ICD-10-CM

## 2021-12-06 DIAGNOSIS — Z02.9 ENCOUNTER FOR ADMINISTRATIVE EXAMINATIONS, UNSPECIFIED: ICD-10-CM

## 2021-12-06 DIAGNOSIS — E11.65 TYPE 2 DIABETES MELLITUS WITH HYPERGLYCEMIA: ICD-10-CM

## 2021-12-06 DIAGNOSIS — Z98.89 OTHER SPECIFIED POSTPROCEDURAL STATES: Chronic | ICD-10-CM

## 2021-12-06 DIAGNOSIS — I50.9 HEART FAILURE, UNSPECIFIED: Chronic | ICD-10-CM

## 2021-12-06 DIAGNOSIS — M54.2 CERVICALGIA: ICD-10-CM

## 2021-12-06 DIAGNOSIS — R11.2 NAUSEA WITH VOMITING, UNSPECIFIED: ICD-10-CM

## 2021-12-06 DIAGNOSIS — Z86.59 PERSONAL HISTORY OF OTHER MENTAL AND BEHAVIORAL DISORDERS: ICD-10-CM

## 2021-12-06 DIAGNOSIS — N18.9 CHRONIC KIDNEY DISEASE, UNSPECIFIED: ICD-10-CM

## 2021-12-06 DIAGNOSIS — N18.5 CHRONIC KIDNEY DISEASE, STAGE 5: ICD-10-CM

## 2021-12-06 LAB
ALBUMIN SERPL ELPH-MCNC: 4.2 G/DL — SIGNIFICANT CHANGE UP (ref 3.3–5)
ALP SERPL-CCNC: 125 U/L — HIGH (ref 40–120)
ALT FLD-CCNC: 17 U/L — SIGNIFICANT CHANGE UP (ref 10–45)
ANION GAP SERPL CALC-SCNC: 16 MMOL/L — SIGNIFICANT CHANGE UP (ref 5–17)
APPEARANCE UR: ABNORMAL
AST SERPL-CCNC: 14 U/L — SIGNIFICANT CHANGE UP (ref 10–40)
B-OH-BUTYR SERPL-SCNC: 0 MMOL/L — SIGNIFICANT CHANGE UP
BACTERIA # UR AUTO: SIGNIFICANT CHANGE UP
BASE EXCESS BLDV CALC-SCNC: -7.2 MMOL/L — LOW (ref -2–2)
BASOPHILS # BLD AUTO: 0.05 K/UL — SIGNIFICANT CHANGE UP (ref 0–0.2)
BASOPHILS NFR BLD AUTO: 0.7 % — SIGNIFICANT CHANGE UP (ref 0–2)
BILIRUB SERPL-MCNC: 0.4 MG/DL — SIGNIFICANT CHANGE UP (ref 0.2–1.2)
BILIRUB UR-MCNC: NEGATIVE — SIGNIFICANT CHANGE UP
BUN SERPL-MCNC: 85 MG/DL — HIGH (ref 7–23)
CA-I SERPL-SCNC: 1.24 MMOL/L — SIGNIFICANT CHANGE UP (ref 1.15–1.33)
CALCIUM SERPL-MCNC: 9.1 MG/DL — SIGNIFICANT CHANGE UP (ref 8.4–10.5)
CHLORIDE BLDV-SCNC: 101 MMOL/L — SIGNIFICANT CHANGE UP (ref 96–108)
CHLORIDE SERPL-SCNC: 100 MMOL/L — SIGNIFICANT CHANGE UP (ref 96–108)
CK MB CFR SERPL CALC: 4.2 NG/ML — SIGNIFICANT CHANGE UP (ref 0–6.7)
CO2 BLDV-SCNC: 22 MMOL/L — SIGNIFICANT CHANGE UP (ref 22–26)
CO2 SERPL-SCNC: 19 MMOL/L — LOW (ref 22–31)
COLOR SPEC: SIGNIFICANT CHANGE UP
COMMENT - URINE: SIGNIFICANT CHANGE UP
CREAT SERPL-MCNC: 4.47 MG/DL — HIGH (ref 0.5–1.3)
DIFF PNL FLD: ABNORMAL
EOSINOPHIL # BLD AUTO: 0.24 K/UL — SIGNIFICANT CHANGE UP (ref 0–0.5)
EOSINOPHIL NFR BLD AUTO: 3.5 % — SIGNIFICANT CHANGE UP (ref 0–6)
EPI CELLS # UR: 5 /HPF — SIGNIFICANT CHANGE UP
GAS PNL BLDV: 130 MMOL/L — LOW (ref 136–145)
GAS PNL BLDV: SIGNIFICANT CHANGE UP
GAS PNL BLDV: SIGNIFICANT CHANGE UP
GLUCOSE BLDC GLUCOMTR-MCNC: 210 MG/DL — HIGH (ref 70–99)
GLUCOSE BLDC GLUCOMTR-MCNC: 309 MG/DL — HIGH (ref 70–99)
GLUCOSE BLDV-MCNC: 246 MG/DL — HIGH (ref 70–99)
GLUCOSE SERPL-MCNC: 224 MG/DL — HIGH (ref 70–99)
GLUCOSE UR QL: ABNORMAL
GRAN CASTS # UR COMP ASSIST: 8 /LPF — SIGNIFICANT CHANGE UP
HCO3 BLDV-SCNC: 20 MMOL/L — LOW (ref 22–29)
HCT VFR BLD CALC: 37.1 % — LOW (ref 39–50)
HCT VFR BLDA CALC: 41 % — SIGNIFICANT CHANGE UP (ref 39–51)
HGB BLD CALC-MCNC: 13.5 G/DL — SIGNIFICANT CHANGE UP (ref 12.6–17.4)
HGB BLD-MCNC: 12.4 G/DL — LOW (ref 13–17)
HYALINE CASTS # UR AUTO: 10 /LPF — HIGH (ref 0–7)
IMM GRANULOCYTES NFR BLD AUTO: 0.7 % — SIGNIFICANT CHANGE UP (ref 0–1.5)
KETONES UR-MCNC: NEGATIVE — SIGNIFICANT CHANGE UP
LACTATE BLDV-MCNC: 2.7 MMOL/L — HIGH (ref 0.7–2)
LACTATE SERPL-SCNC: 2.7 MMOL/L — HIGH (ref 0.7–2)
LEUKOCYTE ESTERASE UR-ACNC: NEGATIVE — SIGNIFICANT CHANGE UP
LYMPHOCYTES # BLD AUTO: 1.81 K/UL — SIGNIFICANT CHANGE UP (ref 1–3.3)
LYMPHOCYTES # BLD AUTO: 26.3 % — SIGNIFICANT CHANGE UP (ref 13–44)
MAGNESIUM SERPL-MCNC: 2.6 MG/DL — SIGNIFICANT CHANGE UP (ref 1.6–2.6)
MCHC RBC-ENTMCNC: 29.7 PG — SIGNIFICANT CHANGE UP (ref 27–34)
MCHC RBC-ENTMCNC: 33.4 GM/DL — SIGNIFICANT CHANGE UP (ref 32–36)
MCV RBC AUTO: 89 FL — SIGNIFICANT CHANGE UP (ref 80–100)
MONOCYTES # BLD AUTO: 0.5 K/UL — SIGNIFICANT CHANGE UP (ref 0–0.9)
MONOCYTES NFR BLD AUTO: 7.3 % — SIGNIFICANT CHANGE UP (ref 2–14)
NEUTROPHILS # BLD AUTO: 4.24 K/UL — SIGNIFICANT CHANGE UP (ref 1.8–7.4)
NEUTROPHILS NFR BLD AUTO: 61.5 % — SIGNIFICANT CHANGE UP (ref 43–77)
NITRITE UR-MCNC: NEGATIVE — SIGNIFICANT CHANGE UP
NRBC # BLD: 0 /100 WBCS — SIGNIFICANT CHANGE UP (ref 0–0)
PCO2 BLDV: 49 MMHG — SIGNIFICANT CHANGE UP (ref 42–55)
PH BLDV: 7.23 — LOW (ref 7.32–7.43)
PH UR: 6 — SIGNIFICANT CHANGE UP (ref 5–8)
PHOSPHATE SERPL-MCNC: 4.8 MG/DL — HIGH (ref 2.5–4.5)
PLATELET # BLD AUTO: 261 K/UL — SIGNIFICANT CHANGE UP (ref 150–400)
PO2 BLDV: 22 MMHG — LOW (ref 25–45)
POTASSIUM BLDV-SCNC: 5.2 MMOL/L — HIGH (ref 3.5–5.1)
POTASSIUM SERPL-MCNC: 4.8 MMOL/L — SIGNIFICANT CHANGE UP (ref 3.5–5.3)
POTASSIUM SERPL-SCNC: 4.8 MMOL/L — SIGNIFICANT CHANGE UP (ref 3.5–5.3)
PROT SERPL-MCNC: 7 G/DL — SIGNIFICANT CHANGE UP (ref 6–8.3)
PROT UR-MCNC: ABNORMAL
RBC # BLD: 4.17 M/UL — LOW (ref 4.2–5.8)
RBC # FLD: 13.8 % — SIGNIFICANT CHANGE UP (ref 10.3–14.5)
RBC CASTS # UR COMP ASSIST: 1 /HPF — SIGNIFICANT CHANGE UP (ref 0–4)
SAO2 % BLDV: 39.7 % — LOW (ref 67–88)
SARS-COV-2 RNA SPEC QL NAA+PROBE: SIGNIFICANT CHANGE UP
SODIUM SERPL-SCNC: 135 MMOL/L — SIGNIFICANT CHANGE UP (ref 135–145)
SP GR SPEC: 1.01 — SIGNIFICANT CHANGE UP (ref 1.01–1.02)
TROPONIN T, HIGH SENSITIVITY RESULT: 104 NG/L — HIGH (ref 0–51)
UROBILINOGEN FLD QL: NEGATIVE — SIGNIFICANT CHANGE UP
WBC # BLD: 6.89 K/UL — SIGNIFICANT CHANGE UP (ref 3.8–10.5)
WBC # FLD AUTO: 6.89 K/UL — SIGNIFICANT CHANGE UP (ref 3.8–10.5)
WBC UR QL: 4 /HPF — SIGNIFICANT CHANGE UP (ref 0–5)

## 2021-12-06 PROCEDURE — 99223 1ST HOSP IP/OBS HIGH 75: CPT

## 2021-12-06 PROCEDURE — 72125 CT NECK SPINE W/O DYE: CPT | Mod: 26

## 2021-12-06 PROCEDURE — 99285 EMERGENCY DEPT VISIT HI MDM: CPT

## 2021-12-06 PROCEDURE — 71045 X-RAY EXAM CHEST 1 VIEW: CPT | Mod: 26

## 2021-12-06 PROCEDURE — 70450 CT HEAD/BRAIN W/O DYE: CPT | Mod: 26

## 2021-12-06 RX ORDER — SODIUM CHLORIDE 9 MG/ML
1000 INJECTION, SOLUTION INTRAVENOUS ONCE
Refills: 0 | Status: COMPLETED | OUTPATIENT
Start: 2021-12-06 | End: 2021-12-06

## 2021-12-06 RX ORDER — INSULIN LISPRO 100/ML
VIAL (ML) SUBCUTANEOUS
Refills: 0 | Status: DISCONTINUED | OUTPATIENT
Start: 2021-12-06 | End: 2021-12-09

## 2021-12-06 RX ORDER — ASPIRIN/CALCIUM CARB/MAGNESIUM 324 MG
81 TABLET ORAL DAILY
Refills: 0 | Status: DISCONTINUED | OUTPATIENT
Start: 2021-12-07 | End: 2021-12-09

## 2021-12-06 RX ORDER — INSULIN LISPRO 100/ML
VIAL (ML) SUBCUTANEOUS AT BEDTIME
Refills: 0 | Status: DISCONTINUED | OUTPATIENT
Start: 2021-12-06 | End: 2021-12-09

## 2021-12-06 RX ORDER — SODIUM CHLORIDE 9 MG/ML
1000 INJECTION, SOLUTION INTRAVENOUS
Refills: 0 | Status: DISCONTINUED | OUTPATIENT
Start: 2021-12-06 | End: 2021-12-09

## 2021-12-06 RX ORDER — SODIUM CHLORIDE 9 MG/ML
1000 INJECTION, SOLUTION INTRAVENOUS
Refills: 0 | Status: DISCONTINUED | OUTPATIENT
Start: 2021-12-06 | End: 2021-12-06

## 2021-12-06 RX ORDER — INSULIN DETEMIR 100/ML (3)
40 INSULIN PEN (ML) SUBCUTANEOUS
Qty: 0 | Refills: 0 | DISCHARGE

## 2021-12-06 RX ORDER — ACETAMINOPHEN 500 MG
650 TABLET ORAL EVERY 6 HOURS
Refills: 0 | Status: DISCONTINUED | OUTPATIENT
Start: 2021-12-06 | End: 2021-12-09

## 2021-12-06 RX ORDER — INSULIN LISPRO 100/ML
8 VIAL (ML) SUBCUTANEOUS
Refills: 0 | Status: DISCONTINUED | OUTPATIENT
Start: 2021-12-06 | End: 2021-12-07

## 2021-12-06 RX ORDER — SODIUM CHLORIDE 9 MG/ML
1000 INJECTION, SOLUTION INTRAVENOUS ONCE
Refills: 0 | Status: DISCONTINUED | OUTPATIENT
Start: 2021-12-06 | End: 2021-12-06

## 2021-12-06 RX ORDER — TAMSULOSIN HYDROCHLORIDE 0.4 MG/1
0.4 CAPSULE ORAL AT BEDTIME
Refills: 0 | Status: DISCONTINUED | OUTPATIENT
Start: 2021-12-06 | End: 2021-12-09

## 2021-12-06 RX ORDER — LEVOCETIRIZINE DIHYDROCHLORIDE 0.5 MG/ML
1 SOLUTION ORAL
Qty: 0 | Refills: 0 | DISCHARGE

## 2021-12-06 RX ORDER — DEXTROSE 50 % IN WATER 50 %
25 SYRINGE (ML) INTRAVENOUS ONCE
Refills: 0 | Status: DISCONTINUED | OUTPATIENT
Start: 2021-12-06 | End: 2021-12-09

## 2021-12-06 RX ORDER — ACETAMINOPHEN 500 MG
650 TABLET ORAL ONCE
Refills: 0 | Status: COMPLETED | OUTPATIENT
Start: 2021-12-06 | End: 2021-12-06

## 2021-12-06 RX ORDER — FAMOTIDINE 10 MG/ML
20 INJECTION INTRAVENOUS DAILY
Refills: 0 | Status: DISCONTINUED | OUTPATIENT
Start: 2021-12-06 | End: 2021-12-09

## 2021-12-06 RX ORDER — HEPARIN SODIUM 5000 [USP'U]/ML
5000 INJECTION INTRAVENOUS; SUBCUTANEOUS EVERY 8 HOURS
Refills: 0 | Status: DISCONTINUED | OUTPATIENT
Start: 2021-12-06 | End: 2021-12-09

## 2021-12-06 RX ORDER — ALLOPURINOL 300 MG
300 TABLET ORAL DAILY
Refills: 0 | Status: DISCONTINUED | OUTPATIENT
Start: 2021-12-06 | End: 2021-12-09

## 2021-12-06 RX ORDER — ATORVASTATIN CALCIUM 80 MG/1
80 TABLET, FILM COATED ORAL AT BEDTIME
Refills: 0 | Status: DISCONTINUED | OUTPATIENT
Start: 2021-12-06 | End: 2021-12-09

## 2021-12-06 RX ORDER — DEXTROSE 50 % IN WATER 50 %
12.5 SYRINGE (ML) INTRAVENOUS ONCE
Refills: 0 | Status: DISCONTINUED | OUTPATIENT
Start: 2021-12-06 | End: 2021-12-09

## 2021-12-06 RX ORDER — ONDANSETRON 8 MG/1
4 TABLET, FILM COATED ORAL ONCE
Refills: 0 | Status: COMPLETED | OUTPATIENT
Start: 2021-12-06 | End: 2021-12-06

## 2021-12-06 RX ORDER — GLUCAGON INJECTION, SOLUTION 0.5 MG/.1ML
1 INJECTION, SOLUTION SUBCUTANEOUS ONCE
Refills: 0 | Status: DISCONTINUED | OUTPATIENT
Start: 2021-12-06 | End: 2021-12-09

## 2021-12-06 RX ORDER — DULOXETINE HYDROCHLORIDE 30 MG/1
30 CAPSULE, DELAYED RELEASE ORAL DAILY
Refills: 0 | Status: DISCONTINUED | OUTPATIENT
Start: 2021-12-07 | End: 2021-12-09

## 2021-12-06 RX ORDER — DEXTROSE 50 % IN WATER 50 %
15 SYRINGE (ML) INTRAVENOUS ONCE
Refills: 0 | Status: DISCONTINUED | OUTPATIENT
Start: 2021-12-06 | End: 2021-12-09

## 2021-12-06 RX ORDER — INSULIN GLARGINE 100 [IU]/ML
25 INJECTION, SOLUTION SUBCUTANEOUS AT BEDTIME
Refills: 0 | Status: DISCONTINUED | OUTPATIENT
Start: 2021-12-06 | End: 2021-12-07

## 2021-12-06 RX ORDER — TICAGRELOR 90 MG/1
90 TABLET ORAL EVERY 12 HOURS
Refills: 0 | Status: DISCONTINUED | OUTPATIENT
Start: 2021-12-06 | End: 2021-12-09

## 2021-12-06 RX ORDER — CARVEDILOL PHOSPHATE 80 MG/1
25 CAPSULE, EXTENDED RELEASE ORAL EVERY 12 HOURS
Refills: 0 | Status: DISCONTINUED | OUTPATIENT
Start: 2021-12-06 | End: 2021-12-09

## 2021-12-06 RX ADMIN — CARVEDILOL PHOSPHATE 25 MILLIGRAM(S): 80 CAPSULE, EXTENDED RELEASE ORAL at 19:22

## 2021-12-06 RX ADMIN — TAMSULOSIN HYDROCHLORIDE 0.4 MILLIGRAM(S): 0.4 CAPSULE ORAL at 21:51

## 2021-12-06 RX ADMIN — SODIUM CHLORIDE 250 MILLILITER(S): 9 INJECTION, SOLUTION INTRAVENOUS at 13:51

## 2021-12-06 RX ADMIN — ATORVASTATIN CALCIUM 80 MILLIGRAM(S): 80 TABLET, FILM COATED ORAL at 22:06

## 2021-12-06 RX ADMIN — ONDANSETRON 4 MILLIGRAM(S): 8 TABLET, FILM COATED ORAL at 13:51

## 2021-12-06 RX ADMIN — Medication 2: at 17:40

## 2021-12-06 RX ADMIN — Medication 650 MILLIGRAM(S): at 13:51

## 2021-12-06 RX ADMIN — Medication 650 MILLIGRAM(S): at 14:16

## 2021-12-06 RX ADMIN — TICAGRELOR 90 MILLIGRAM(S): 90 TABLET ORAL at 19:23

## 2021-12-06 RX ADMIN — SODIUM CHLORIDE 250 MILLILITER(S): 9 INJECTION, SOLUTION INTRAVENOUS at 14:16

## 2021-12-06 RX ADMIN — HEPARIN SODIUM 5000 UNIT(S): 5000 INJECTION INTRAVENOUS; SUBCUTANEOUS at 21:51

## 2021-12-06 RX ADMIN — INSULIN GLARGINE 25 UNIT(S): 100 INJECTION, SOLUTION SUBCUTANEOUS at 21:50

## 2021-12-06 RX ADMIN — Medication 2: at 21:50

## 2021-12-06 NOTE — ED PROVIDER NOTE - PHYSICAL EXAMINATION
Gen: WDWN, NAD  HEENT: EOMI, no nasal discharge, mucous membranes mildly dry  CV:  2+ radial pulses b/l  Resp: no accessory muscle use, no increased work of breathing  GI: Abdomen soft non-distended, NTTP  MSK: No open wounds, no bruising, no LE edema  Neuro: A&Ox4, following commands, moving all four extremities spontaneously  Psych: appropriate mood

## 2021-12-06 NOTE — H&P ADULT - NSHPOUTPATIENTPROVIDERS_GEN_ALL_CORE
Jeanne Barragan MD (PCP) [phone]  Steve Hernandez MD (endocrinology) Jeanne Barragan MD (PCP) [phone]  Steve Hernandez MD (endocrinology)  [phone] Jeanne Barragan MD (PCP) [phone]  Steve Hernandez MD (endocrinology)  [phone]  Rocio Plata MD (Dileep Neprhology) [phone]

## 2021-12-06 NOTE — ED ADULT NURSE REASSESSMENT NOTE - NS ED NURSE REASSESS COMMENT FT1
Report received from SELVIN Macdonald. Patient resting in bed with no current complaints. Medicated with daily meds per orders. Admitted and awaiting bed assignment.

## 2021-12-06 NOTE — ED PROVIDER NOTE - NSICDXPASTSURGICALHX_GEN_ALL_CORE_FT
PAST SURGICAL HISTORY:  CHF with cardiomyopathy Insertion of Cardiomems monitor    Diabetes with retinopathy S/P PPV/PRP/GAS  OD 2/22/17 for viterous hemorrhage    H/O lithotripsy     History of eye surgery left eye    Retinal Hemorrhage     S/P coronary artery stent placement 2010 TRICIA to Diag ; 11/18/2010  LAD; 2/4/11 ATOM liberte Diag; 5/15/2017  TRICIA to 1st diag; 6/7/17 PCI with laser and high pressure balloon    Stented coronary artery

## 2021-12-06 NOTE — CHART NOTE - NSCHARTNOTEFT_GEN_A_CORE
Patient well known to me.  But last seen by me in office 2019 with frequent cancellations and no-shows.   Presented with hyperglycemia.   Patient takes Levemir 50 units at home.   Humalog last dosage was 12-20 units tid with meals.   Noted to have worsening renal function.  Recommend Lantus 25 units at bedtime  Admelog 8 units tid with meals  LDSS qac/hs  Full consult to follow.    Will likely need renal consultation as well.  Plan discussed with Dr. Marin.

## 2021-12-06 NOTE — H&P ADULT - HISTORY OF PRESENT ILLNESS
NIGHT HOSPITALIST:   Patient UNKNOWN to me previously, assigned to me at this point via the ER to admit this 60 y/o M--followed by his physicians above but apparently with poor compliance with followup--last discharged from [facility] in July 2018--patient with a history of CAD S/P past PCI on dual antiplatelet therapy, reported ischaemic cardiomyopathy with moderate LV dysfunction (46%) with s/P Cardiomems, chronic kidney disease last Cr in 2018, essential HTN maintained on an ARB, Torsemide, Coreg, history of depression maintained on daily Cymbalta, type 2 DM on insulin with apparent complicating retinopathy and neuropathy, history of gout, several year history of neck pain with past MRI with cervical stenosis, with the patient sent in from an outpatient office (he does not know the physician who referred him to the ER) with patient reporting his FS of 480 this AM and he administered his short acting insulin, but with an episode of nausea with episodes of HA and LEFT sided neck cramping, worse with movement of the neck, improved with rest and recumbency.  No chest pain/pressure.  No palpitations.  Patient admits to poor exercise tolerance and self limits his activity.  No N/V at present and wishes a regular diet.  No focal weakness.  NO tearing neck pain.  NO back pain, no tearing back pain.   NIGHT HOSPITALIST:   Patient UNKNOWN to me previously, assigned to me at this point via the ER to admit this 62 y/o M--followed by his physicians above but apparently with poor compliance with followup--last discharged from [facility] in July 2018--patient with a history of CAD S/P past PCI on dual antiplatelet therapy, reported ischaemic cardiomyopathy with moderate LV dysfunction (46%) with s/P Cardiomems, chronic kidney disease last Cr in 2018, essential HTN maintained on an ARB, Torsemide, Coreg, history of depression maintained on daily Cymbalta, type 2 DM on insulin with apparent complicating retinopathy and neuropathy, history of gout, several year history of neck pain with past MRI with cervical stenosis, with the patient sent in from an outpatient office (he does not know the physician who referred him to the ER) with patient reporting his FS of 480 this AM and he administered his short acting insulin, but with an episode of nausea with episodes of HA and LEFT sided neck cramping, worse with movement of the neck, improved with rest and recumbency.  No chest pain/pressure.  No palpitations.  Patient admits to poor exercise tolerance and self limits his activity.  No N/V at present and wishes a regular diet.  No focal weakness.  NO tearing neck pain.  NO back pain, no tearing back pain.      Patient reports receiving Pfizer COVID-19 vaccine x 2.

## 2021-12-06 NOTE — ED PROVIDER NOTE - CARE PLAN
1 Principal Discharge DX:	Chronic kidney disease, unspecified CKD stage  Secondary Diagnosis:	Hyperglycemia

## 2021-12-06 NOTE — H&P ADULT - NSHPADDITIONALINFOADULT_GEN_ALL_CORE
NIGHT HOSPITALIST:    Patient / spouse aware of course and agree with plan/care as above.   Given patient's comorbidities, patient's long term prognosis is guarded.  Emotional support provided to patient/spouse.  Care reviewed with covering NP/PA for endorsement to my physician colleagues.    Kumar Hubbard MD  [phone] NIGHT HOSPITALIST:    Patient / spouse aware of course and agree with plan/care as above.   Given patient's comorbidities, patient's long term prognosis is guarded.  Emotional support provided to patient/spouse.  Care reviewed with covering NP/PA, Giana,  for endorsement to my physician colleagues.    Kumar Hubbard MD  [phone]

## 2021-12-06 NOTE — ED ADULT NURSE NOTE - OBJECTIVE STATEMENT
61 yr old male to ED via EMS from Neurologist office Pt Type1 diabetic is seen for chronic neuropathy. Was sent to ED for High B/P 400's 411 per EMS Done in . Pt awake alert and orientedx4 Resp even and nonlab Denies chest pain or SOB C/o nausea No vomit at this time. Denies abd pain Denies dizziness or lightheadedness Spouse at bedside

## 2021-12-06 NOTE — PATIENT PROFILE ADULT - FALL HARM RISK - HARM RISK INTERVENTIONS
Assistance with ambulation/Assistance OOB with selected safe patient handling equipment/Communicate Risk of Fall with Harm to all staff/Discuss with provider need for PT consult/Monitor gait and stability/Provide patient with walking aids - walker, cane, crutches/Reinforce activity limits and safety measures with patient and family/Sit up slowly, dangle for a short time, stand at bedside before walking/Tailored Fall Risk Interventions/Visual Cue: Yellow wristband and red socks/Bed in lowest position, wheels locked, appropriate side rails in place/Call bell, personal items and telephone in reach/Instruct patient to call for assistance before getting out of bed or chair/Non-slip footwear when patient is out of bed/[place] to call system/Physically safe environment - no spills, clutter or unnecessary equipment/Purposeful Proactive Rounding/Room/bathroom lighting operational, light cord in reach

## 2021-12-06 NOTE — H&P ADULT - ASSESSMENT
NIGHT HOSPITALIST:   NIGHT HOSPITALIST:   Referral of patient with episode of nausea in the setting of patient with type 2 DM with sequelae of complications of retinopathy, neuropathy but with worsening kidney function, now with CKD NIGHT HOSPITALIST:   Referral of patient with episode of nausea in the setting of patient with type 2 DM with sequelae of complications of retinopathy, neuropathy but with worsening kidney function, now with CKD5 with a complex medical history of CAD, cardiomyopathy, gout, depression (patient refuses psychiatry referral) with poor compliance with outpatient followup,  NIGHT HOSPITALIST:   Referral of patient with episode of nausea in the setting of patient with type 2 DM with sequelae of complications of retinopathy, neuropathy but with worsening kidney function, now with CKD5 with a complex medical history of CAD, cardiomyopathy, gout, depression (patient refuses psychiatry referral) with poor compliance with outpatient followup, evidence from MRI cervical from 2018 of multilevel cervical spondylosis with C3-C4 w/ moderate L>right b/L neural foraminal narrowing, elevated troponin difficult to interpret in light of renal insufficiency, but will have to assume cardiac equivalent for now, given history.   Patient with NO anion gap or acidosis>>not DKA.  Unclear to the significance of the elevated lactate, presumably from patient's initial N/V.  Patient with presently no evidence of SIRS or hypotension.  Upgraded to telemetry.  Echo.   CTT head and cervical spine no contrast.    Reviewed with the endocrinology attending>>recommendation for reduction of long acting insulin to Lantus 25 U at bedtime and 8 U preprandial short acting insulin.  Nutrition consult.    Will temporarily HOLD the ARB with CKD5.   Will temporarily HOLD the torsemide for review in the AM for dosing. with patient presently not in pulmonary oedema.  Will cautiously HOLD the Neurontin for now with patient's CKD5.  Renal US.  Would consider formal nephrology evaluation in the AM.     NIGHT HOSPITALIST:   Referral of patient with episode of nausea in the setting of patient with type 2 DM with sequelae of complications of retinopathy, neuropathy but with worsening kidney function, now with CKD5 with a complex medical history of CAD, cardiomyopathy, gout, depression (patient refuses psychiatry referral) with poor compliance with outpatient followup, evidence from MRI cervical from 2018 of multilevel cervical spondylosis with C3-C4 w/ moderate L>right b/L neural foraminal narrowing, elevated troponin difficult to interpret in light of renal insufficiency, but will have to assume cardiac equivalent for now, given history.   Patient with NO anion gap or acidosis>>not DKA.  Unclear to the significance of the elevated lactate, presumably from patient's initial N/V.  Patient with presently no evidence of SIRS or hypotension.  Upgraded to telemetry.  Echo.   CTT head and cervical spine no contrast.   Patient's depression is likely complicating patient's compliance, but patient refuses psychiatry referral.    Reviewed with the endocrinology attending>>recommendation for reduction of long acting insulin to Lantus 25 U at bedtime and 8 U preprandial short acting insulin.  Nutrition consult.    Will temporarily HOLD the ARB with CKD5.   Will temporarily HOLD the torsemide for review in the AM for dosing. with patient presently not in pulmonary oedema.  Will cautiously HOLD the Neurontin for now with patient's CKD5.  Renal US.  Would consider formal nephrology evaluation in the AM.

## 2021-12-06 NOTE — ED PROVIDER NOTE - PROGRESS NOTE DETAILS
Samantha Anderson MD, PGY-2: signout given to hospitalist. admission for acute on chronic CKD, further w/u includes urine studies and renal us

## 2021-12-06 NOTE — PATIENT PROFILE ADULT - DOES PATIENT HAVE ADVANCE DIRECTIVE
Quality 110: Preventive Care And Screening: Influenza Immunization: Influenza Immunization Administered during Influenza season Quality 226: Preventive Care And Screening: Tobacco Use: Screening And Cessation Intervention: Patient screened for tobacco use and is an ex/non-smoker Quality 131: Pain Assessment And Follow-Up: Pain assessment NOT documented as being performed, documentation the patient is not eligible for a pain assessment using a standardized tool Detail Level: Detailed Yes

## 2021-12-06 NOTE — ED PROVIDER NOTE - NS ED ROS FT
Gen: Denies fevers  CV: Denies chest pain  Resp: Denies SOB  GI: + nausea, vomiting  : Denies dysuria  Neuro: + baseline diabetic neuropathy  all other ROS negative unless indicated in HPI

## 2021-12-06 NOTE — H&P ADULT - PROBLEM SELECTOR PLAN 5
Stable
See above.   ARB temporarily HELD for now.  Would review with renal dosing for torsemide in the AM with CKD5.   Patient's nephrologist, contacted by examiner for consult for the AM.

## 2021-12-06 NOTE — ED PROVIDER NOTE - OBJECTIVE STATEMENT
62YO M hx of CKD, CHF, CAD, p/w n/v x1 today,  at clinic for routine visit for diabetic neuropathy. no changes to normal insulin regimen, no changes to normal diet today, pt ate breakfast. denies abdominal pain, chest pain. endorses mild neck cramping.

## 2021-12-06 NOTE — H&P ADULT - NSHPLABSRESULTS_GEN_ALL_CORE
WBC 6.8   NORMAL differential    Hgb 12.4    Platelets of 261K.    K+ 4.8    Random glucose of 224.  HCO3 19      AG 16    Cr 4.4   (Cr 2.1 in July 2018)    Alb 4.2    Mg++ 2.6  beta hydroxybutyrate nil.    HS troponin 120>> 104    EKG tracing reviewed with NSR at 82 with Q V1V2.     WBC 6.8   NORMAL differential    Hgb 12.4    Platelets of 261K.    K+ 4.8    Random glucose of 224.  HCO3 19      AG 16    Cr 4.4   (Cr 2.1 in July 2018)    Alb 4.2    Mg++ 2.6  beta hydroxybutyrate nil.    HS troponin 120>> 104    EKG tracing reviewed with NSR at 82 with Q V1V2.        Chest radiograph reviewed with AP film with LEFT costophrenic angle not visualized but reviewed with no acute infiltrate or effusion from visible fields.

## 2021-12-06 NOTE — H&P ADULT - NSHPREVIEWOFSYSTEMS_GEN_ALL_CORE
NO fever, no chills, no rigors.  NO abdominal pain, no red blood per rectum or melena.  NO rash.  NO back pain, no tearing back pain.  No dysuria, no hematuria.  NO weight loss or anorexia.    + polyuria, poly phagia.  No suicidal or homicidal ideation.  NO node swelling.  NO thyroid symptoms.

## 2021-12-06 NOTE — H&P ADULT - RS GEN PE MLT RESP DETAILS PC
airway patent/breath sounds equal/respirations non-labored/clear to auscultation bilaterally/no rales/no rhonchi

## 2021-12-06 NOTE — H&P ADULT - PROBLEM SELECTOR PLAN 7
Transitions of Care Status:  1.  Name of PCP:     Jeanne Barragan MD (PCP) [phone]  2.  PCP Contacted on Admission: [ ] Y    [x ] N    3.  PCP contacted at Discharge: [ ] Y    [ ] N    [ ] N/A  4.  Post-Discharge Appointment Date and Location:  5.  Summary of Handoff given to PCP:

## 2021-12-06 NOTE — H&P ADULT - PROBLEM SELECTOR PLAN 3
See above.   Suspect issues dating from 2018>>doubt dissection.  CTT head and neck ordered with no contrast.

## 2021-12-06 NOTE — ED PROVIDER NOTE - NSICDXFAMILYHX_GEN_ALL_CORE_FT
FAMILY HISTORY:  Father  Still living? Unknown  Family history of cardiac disorder in father, Age at diagnosis: Age Unknown

## 2021-12-06 NOTE — H&P ADULT - NSHPSOURCEINFOTX_GEN_ALL_CORE
Reviewed [employer] with patient with copy in chart.  [facility]  Reviewed Medex with patient with copy in chart.  [facility]  contacted for partial list. Reviewed [employer] with patient with copy in chart.  [facility]  contacted for partial list.  Spouse updated with patient's permission, Lucía Graciela, [phone]

## 2021-12-06 NOTE — H&P ADULT - PROBLEM SELECTOR PLAN 1
See above.  Requesting PO.  Presentation not consistent with DKA.  Upgraded to telemetry to exclude cardiac equivalent.  Echo.  Could consider GI workup if the cardiac workup nondiagnostic.

## 2021-12-06 NOTE — ED PROVIDER NOTE - CLINICAL SUMMARY MEDICAL DECISION MAKING FREE TEXT BOX
Samantha Anderson MD, PGY-2: 62YO M hx of CKD, CHF, CAD, p/w n/v x1 today,  at clinic. VSS, PE no abnormalities. suspect DKA given vomiting and hyperglycemia, consider acute on chronic CKD given dehydration. plan for basic labs, dka w/u, admission for endocrine. Samantha Anderson MD, PGY-2: 60YO M hx of CKD, CHF, CAD, p/w n/v x1 today,  at clinic. VSS, PE no abnormalities. suspect DKA given vomiting and hyperglycemia, consider acute on chronic CKD given dehydration. plan for basic labs, dka w/u, admission for endocrine.    RAYRAY Mccormick MD: Agree with resident/ACP MDM, assessment and plan as above.

## 2021-12-06 NOTE — ED PROVIDER NOTE - NSICDXPASTMEDICALHX_GEN_ALL_CORE_FT
PAST MEDICAL HISTORY:  ASHD (arteriosclerotic heart disease)     BPH (benign prostatic hyperplasia)     CAD (coronary artery disease)     CKD (chronic kidney disease)     Diabetes Mellitus Type II, Uncontrolled     Diabetic retinopathy     Dyslipidemia     GERD (gastroesophageal reflux disease)     Gout     Hepatitis     HTN (hypertension)     Ischemic cardiomyopathy     Myocardial infarction     Nephrolithiasis     Pulmonary hypertension     s/p Angioplasty with Stent     Vitamin D deficiency

## 2021-12-07 DIAGNOSIS — M10.9 GOUT, UNSPECIFIED: ICD-10-CM

## 2021-12-07 DIAGNOSIS — N17.9 ACUTE KIDNEY FAILURE, UNSPECIFIED: ICD-10-CM

## 2021-12-07 DIAGNOSIS — I10 ESSENTIAL (PRIMARY) HYPERTENSION: ICD-10-CM

## 2021-12-07 DIAGNOSIS — E78.49 OTHER HYPERLIPIDEMIA: ICD-10-CM

## 2021-12-07 DIAGNOSIS — E11.65 TYPE 2 DIABETES MELLITUS WITH HYPERGLYCEMIA: ICD-10-CM

## 2021-12-07 DIAGNOSIS — R77.8 OTHER SPECIFIED ABNORMALITIES OF PLASMA PROTEINS: ICD-10-CM

## 2021-12-07 DIAGNOSIS — N40.0 BENIGN PROSTATIC HYPERPLASIA WITHOUT LOWER URINARY TRACT SYMPTOMS: ICD-10-CM

## 2021-12-07 LAB
A1C WITH ESTIMATED AVERAGE GLUCOSE RESULT: 11.7 % — HIGH (ref 4–5.6)
A1C WITH ESTIMATED AVERAGE GLUCOSE RESULT: 11.8 % — HIGH (ref 4–5.6)
ANION GAP SERPL CALC-SCNC: 15 MMOL/L — SIGNIFICANT CHANGE UP (ref 5–17)
BASOPHILS # BLD AUTO: 0.04 K/UL — SIGNIFICANT CHANGE UP (ref 0–0.2)
BASOPHILS NFR BLD AUTO: 0.7 % — SIGNIFICANT CHANGE UP (ref 0–2)
BUN SERPL-MCNC: 85 MG/DL — HIGH (ref 7–23)
CALCIUM SERPL-MCNC: 8.5 MG/DL — SIGNIFICANT CHANGE UP (ref 8.4–10.5)
CHLORIDE SERPL-SCNC: 98 MMOL/L — SIGNIFICANT CHANGE UP (ref 96–108)
CO2 SERPL-SCNC: 17 MMOL/L — LOW (ref 22–31)
CREAT SERPL-MCNC: 4.24 MG/DL — HIGH (ref 0.5–1.3)
CULTURE RESULTS: SIGNIFICANT CHANGE UP
EOSINOPHIL # BLD AUTO: 0.29 K/UL — SIGNIFICANT CHANGE UP (ref 0–0.5)
EOSINOPHIL NFR BLD AUTO: 4.8 % — SIGNIFICANT CHANGE UP (ref 0–6)
ESTIMATED AVERAGE GLUCOSE: 289 MG/DL — HIGH (ref 68–114)
ESTIMATED AVERAGE GLUCOSE: 292 MG/DL — HIGH (ref 68–114)
GLUCOSE BLDC GLUCOMTR-MCNC: 111 MG/DL — HIGH (ref 70–99)
GLUCOSE BLDC GLUCOMTR-MCNC: 138 MG/DL — HIGH (ref 70–99)
GLUCOSE BLDC GLUCOMTR-MCNC: 140 MG/DL — HIGH (ref 70–99)
GLUCOSE BLDC GLUCOMTR-MCNC: 162 MG/DL — HIGH (ref 70–99)
GLUCOSE BLDC GLUCOMTR-MCNC: 187 MG/DL — HIGH (ref 70–99)
GLUCOSE BLDC GLUCOMTR-MCNC: 348 MG/DL — HIGH (ref 70–99)
GLUCOSE SERPL-MCNC: 307 MG/DL — HIGH (ref 70–99)
HCT VFR BLD CALC: 31.4 % — LOW (ref 39–50)
HCV AB S/CO SERPL IA: 0.08 S/CO — SIGNIFICANT CHANGE UP (ref 0–0.99)
HCV AB SERPL-IMP: SIGNIFICANT CHANGE UP
HGB BLD-MCNC: 10.5 G/DL — LOW (ref 13–17)
IMM GRANULOCYTES NFR BLD AUTO: 0.5 % — SIGNIFICANT CHANGE UP (ref 0–1.5)
LACTATE SERPL-SCNC: 1.3 MMOL/L — SIGNIFICANT CHANGE UP (ref 0.7–2)
LYMPHOCYTES # BLD AUTO: 2.78 K/UL — SIGNIFICANT CHANGE UP (ref 1–3.3)
LYMPHOCYTES # BLD AUTO: 45.8 % — HIGH (ref 13–44)
MCHC RBC-ENTMCNC: 30 PG — SIGNIFICANT CHANGE UP (ref 27–34)
MCHC RBC-ENTMCNC: 33.4 GM/DL — SIGNIFICANT CHANGE UP (ref 32–36)
MCV RBC AUTO: 89.7 FL — SIGNIFICANT CHANGE UP (ref 80–100)
MONOCYTES # BLD AUTO: 0.47 K/UL — SIGNIFICANT CHANGE UP (ref 0–0.9)
MONOCYTES NFR BLD AUTO: 7.7 % — SIGNIFICANT CHANGE UP (ref 2–14)
NEUTROPHILS # BLD AUTO: 2.46 K/UL — SIGNIFICANT CHANGE UP (ref 1.8–7.4)
NEUTROPHILS NFR BLD AUTO: 40.5 % — LOW (ref 43–77)
NRBC # BLD: 0 /100 WBCS — SIGNIFICANT CHANGE UP (ref 0–0)
PLATELET # BLD AUTO: 215 K/UL — SIGNIFICANT CHANGE UP (ref 150–400)
POTASSIUM SERPL-MCNC: 4.3 MMOL/L — SIGNIFICANT CHANGE UP (ref 3.5–5.3)
POTASSIUM SERPL-SCNC: 4.3 MMOL/L — SIGNIFICANT CHANGE UP (ref 3.5–5.3)
RBC # BLD: 3.5 M/UL — LOW (ref 4.2–5.8)
RBC # FLD: 13.7 % — SIGNIFICANT CHANGE UP (ref 10.3–14.5)
SODIUM SERPL-SCNC: 130 MMOL/L — LOW (ref 135–145)
SPECIMEN SOURCE: SIGNIFICANT CHANGE UP
WBC # BLD: 6.07 K/UL — SIGNIFICANT CHANGE UP (ref 3.8–10.5)
WBC # FLD AUTO: 6.07 K/UL — SIGNIFICANT CHANGE UP (ref 3.8–10.5)

## 2021-12-07 PROCEDURE — 99233 SBSQ HOSP IP/OBS HIGH 50: CPT

## 2021-12-07 PROCEDURE — 99223 1ST HOSP IP/OBS HIGH 75: CPT

## 2021-12-07 PROCEDURE — 93010 ELECTROCARDIOGRAM REPORT: CPT

## 2021-12-07 PROCEDURE — 93306 TTE W/DOPPLER COMPLETE: CPT | Mod: 26

## 2021-12-07 PROCEDURE — 76775 US EXAM ABDO BACK WALL LIM: CPT | Mod: 26

## 2021-12-07 RX ORDER — INSULIN LISPRO 100/ML
6 VIAL (ML) SUBCUTANEOUS
Refills: 0 | Status: DISCONTINUED | OUTPATIENT
Start: 2021-12-07 | End: 2021-12-09

## 2021-12-07 RX ORDER — INSULIN GLARGINE 100 [IU]/ML
40 INJECTION, SOLUTION SUBCUTANEOUS AT BEDTIME
Refills: 0 | Status: DISCONTINUED | OUTPATIENT
Start: 2021-12-07 | End: 2021-12-09

## 2021-12-07 RX ORDER — INFLUENZA VIRUS VACCINE 15; 15; 15; 15 UG/.5ML; UG/.5ML; UG/.5ML; UG/.5ML
0.5 SUSPENSION INTRAMUSCULAR ONCE
Refills: 0 | Status: COMPLETED | OUTPATIENT
Start: 2021-12-07 | End: 2021-12-09

## 2021-12-07 RX ORDER — GABAPENTIN 400 MG/1
300 CAPSULE ORAL
Refills: 0 | Status: DISCONTINUED | OUTPATIENT
Start: 2021-12-07 | End: 2021-12-09

## 2021-12-07 RX ORDER — INSULIN LISPRO 100/ML
10 VIAL (ML) SUBCUTANEOUS
Refills: 0 | Status: DISCONTINUED | OUTPATIENT
Start: 2021-12-07 | End: 2021-12-07

## 2021-12-07 RX ORDER — GABAPENTIN 400 MG/1
300 CAPSULE ORAL THREE TIMES A DAY
Refills: 0 | Status: DISCONTINUED | OUTPATIENT
Start: 2021-12-07 | End: 2021-12-07

## 2021-12-07 RX ADMIN — Medication 6 UNIT(S): at 18:50

## 2021-12-07 RX ADMIN — DULOXETINE HYDROCHLORIDE 30 MILLIGRAM(S): 30 CAPSULE, DELAYED RELEASE ORAL at 09:19

## 2021-12-07 RX ADMIN — Medication 8 UNIT(S): at 09:18

## 2021-12-07 RX ADMIN — Medication 300 MILLIGRAM(S): at 09:18

## 2021-12-07 RX ADMIN — HEPARIN SODIUM 5000 UNIT(S): 5000 INJECTION INTRAVENOUS; SUBCUTANEOUS at 14:55

## 2021-12-07 RX ADMIN — TAMSULOSIN HYDROCHLORIDE 0.4 MILLIGRAM(S): 0.4 CAPSULE ORAL at 21:59

## 2021-12-07 RX ADMIN — HEPARIN SODIUM 5000 UNIT(S): 5000 INJECTION INTRAVENOUS; SUBCUTANEOUS at 05:34

## 2021-12-07 RX ADMIN — Medication 81 MILLIGRAM(S): at 09:18

## 2021-12-07 RX ADMIN — TICAGRELOR 90 MILLIGRAM(S): 90 TABLET ORAL at 18:09

## 2021-12-07 RX ADMIN — ATORVASTATIN CALCIUM 80 MILLIGRAM(S): 80 TABLET, FILM COATED ORAL at 21:57

## 2021-12-07 RX ADMIN — HEPARIN SODIUM 5000 UNIT(S): 5000 INJECTION INTRAVENOUS; SUBCUTANEOUS at 21:59

## 2021-12-07 RX ADMIN — INSULIN GLARGINE 40 UNIT(S): 100 INJECTION, SOLUTION SUBCUTANEOUS at 22:02

## 2021-12-07 RX ADMIN — Medication 4: at 09:18

## 2021-12-07 RX ADMIN — GABAPENTIN 300 MILLIGRAM(S): 400 CAPSULE ORAL at 18:09

## 2021-12-07 RX ADMIN — TICAGRELOR 90 MILLIGRAM(S): 90 TABLET ORAL at 06:40

## 2021-12-07 RX ADMIN — Medication 8 UNIT(S): at 14:55

## 2021-12-07 RX ADMIN — CARVEDILOL PHOSPHATE 25 MILLIGRAM(S): 80 CAPSULE, EXTENDED RELEASE ORAL at 05:34

## 2021-12-07 RX ADMIN — CARVEDILOL PHOSPHATE 25 MILLIGRAM(S): 80 CAPSULE, EXTENDED RELEASE ORAL at 18:09

## 2021-12-07 RX ADMIN — FAMOTIDINE 20 MILLIGRAM(S): 10 INJECTION INTRAVENOUS at 06:42

## 2021-12-07 NOTE — CHART NOTE - NSCHARTNOTEFT_GEN_A_CORE
Notified by that RN that patient feels "burning sensation in his stomach". Pt was evaluated at bedside, no acute distress was noted. Pt endorsed the burning sensation was apparent his stomach and would rise to this throat. Pt denies pain while eating. Pt denies, chest pain, palpitations, sob, nausea/vomiting, abdominal pain, and diaphoresis.     Vital Signs Last 24 Hrs  T(C): 36.4 (07 Dec 2021 04:10), Max: 36.8 (06 Dec 2021 13:08)  T(F): 97.5 (07 Dec 2021 04:10), Max: 98.2 (06 Dec 2021 13:08)  HR: 73 (07 Dec 2021 04:10) (73 - 94)  BP: 110/68 (07 Dec 2021 04:10) (101/70 - 130/81)  BP(mean): --  RR: 18 (07 Dec 2021 04:10) (16 - 18)  SpO2: 99% (07 Dec 2021 04:10) (95% - 100%)      Labs:                          10.5   6.07  )-----------( 215      ( 07 Dec 2021 05:29 )             31.4     12-07    130<L>  |  98  |  85<H>  ----------------------------<  307<H>  4.3   |  17<L>  |  4.24<H>    Ca    8.5      07 Dec 2021 05:29  Phos  4.8     12-06  Mg     2.6     12-06    TPro  7.0  /  Alb  4.2  /  TBili  0.4  /  DBili  x   /  AST  14  /  ALT  17  /  AlkPhos  125<H>  12-06        Physical Exam:  General: WN/WD NAD  Neurology: A&Ox3, nonfocal, LÓPEZ x 4  Head:  Normocephalic, atraumatic  Respiratory: CTA B/L  CV: RRR, S1S2, no murmur  Abdominal: Soft, NT, ND no palpable mass  MSK: No edema, + peripheral pulses, FROM all 4 extremity    Assessment & Plan:  HPI:  NIGHT HOSPITALIST:   Patient UNKNOWN to me previously, assigned to me at this point via the ER to admit this 60 y/o M--followed by his physicians above but apparently with poor compliance with followup--last discharged from [facility] in July 2018--patient with a history of CAD S/P past PCI on dual antiplatelet therapy, reported ischaemic cardiomyopathy with moderate LV dysfunction (46%) with s/P Cardiomems, chronic kidney disease last Cr in 2018, essential HTN maintained on an ARB, Torsemide, Coreg, history of depression maintained on daily Cymbalta, type 2 DM on insulin with apparent complicating retinopathy and neuropathy, history of gout, several year history of neck pain with past MRI with cervical stenosis, with the patient sent in from an outpatient office (he does not know the physician who referred him to the ER) with patient reporting his FS of 480 this AM and he administered his short acting insulin, but with an episode of nausea with episodes of HA and LEFT sided neck cramping, worse with movement of the neck, improved with rest and recumbency.  No chest pain/pressure.  No palpitations.  Patient admits to poor exercise tolerance and self limits his activity.  No N/V at present and wishes a regular diet.  No focal weakness.  NO tearing neck pain.  NO back pain, no tearing back pain.  Now presents with a "burning sensation" in his stomach       Plan:        Follow up with Attending in AM.    Samira Marmolejo PA-C  Department of Medicine  [facility] Notified by that RN that patient feels "burning sensation in his stomach". Pt was evaluated at bedside, no acute distress was noted. Pt endorsed the burning sensation was apparent his stomach and would rise to this throat. Pt denies pain while eating/drinking. Pt denies, chest pain, palpitations, sob, nausea/vomiting, abdominal pain, and diaphoresis.     Vital Signs Last 24 Hrs  T(C): 36.4 (07 Dec 2021 04:10), Max: 36.8 (06 Dec 2021 13:08)  T(F): 97.5 (07 Dec 2021 04:10), Max: 98.2 (06 Dec 2021 13:08)  HR: 73 (07 Dec 2021 04:10) (73 - 94)  BP: 110/68 (07 Dec 2021 04:10) (101/70 - 130/81)  BP(mean): --  RR: 18 (07 Dec 2021 04:10) (16 - 18)  SpO2: 99% (07 Dec 2021 04:10) (95% - 100%)      Labs:                          10.5   6.07  )-----------( 215      ( 07 Dec 2021 05:29 )             31.4     12-07    130<L>  |  98  |  85<H>  ----------------------------<  307<H>  4.3   |  17<L>  |  4.24<H>    Ca    8.5      07 Dec 2021 05:29  Phos  4.8     12-06  Mg     2.6     12-06    TPro  7.0  /  Alb  4.2  /  TBili  0.4  /  DBili  x   /  AST  14  /  ALT  17  /  AlkPhos  125<H>  12-06        Physical Exam:  General: WN/WD NAD  Neurology: A&Ox3, nonfocal, LÓPEZ x 4  Head:  Normocephalic, atraumatic  Respiratory: CTA B/L  CV: RRR, S1S2, no murmur  Abdominal: Soft, NT, ND no palpable mass  MSK: No edema, + peripheral pulses, FROM all 4 extremity    Assessment & Plan:  HPI:  NIGHT HOSPITALIST:   Patient UNKNOWN to me previously, assigned to me at this point via the ER to admit this 60 y/o M--followed by his physicians above but apparently with poor compliance with followup--last discharged from [facility] in July 2018--patient with a history of CAD S/P past PCI on dual antiplatelet therapy, reported ischaemic cardiomyopathy with moderate LV dysfunction (46%) with s/P Cardiomems, chronic kidney disease last Cr in 2018, essential HTN maintained on an ARB, Torsemide, Coreg, history of depression maintained on daily Cymbalta, type 2 DM on insulin with apparent complicating retinopathy and neuropathy, history of gout, several year history of neck pain with past MRI with cervical stenosis, with the patient sent in from an outpatient office (he does not know the physician who referred him to the ER) with patient reporting his FS of 480 this AM and he administered his short acting insulin, but with an episode of nausea with episodes of HA and LEFT sided neck cramping, worse with movement of the neck, improved with rest and recumbency.  No chest pain/pressure.  No palpitations.  Patient admits to poor exercise tolerance and self limits his activity.  No N/V at present and wishes a regular diet.  No focal weakness.  NO tearing neck pain.  NO back pain, no tearing back pain.  Now presents with a "burning sensation" in his stomach     IMPRESSION: ?GERD vs ?peptic ulcer   - Pt is asymptomatic, mentating well with stable vitals at bedside   - pepcid 20mg x1, already ordered for patient  - consider GI consult in AM if deemed necessary   - continue to monitor vitals closely overnight   - endorse/sign out to day team on overnight events   - RN aware of management        Samira Marmolejo PA-C  Department of Medicine  [phone]

## 2021-12-07 NOTE — CONSULT NOTE ADULT - SUBJECTIVE AND OBJECTIVE BOX
NEPHROLOGY - NSN    Patient seen and examined.    HPI:  NIGHT HOSPITALIST:   Patient UNKNOWN to me previously, assigned to me at this point via the ER to admit this 62 y/o M--followed by his physicians above but apparently with poor compliance with followup--last discharged from [facility] in 2018--patient with a history of CAD S/P past PCI on dual antiplatelet therapy, reported ischaemic cardiomyopathy with moderate LV dysfunction (46%) with s/P Cardiomems, chronic kidney disease last Cr in 2018, essential HTN maintained on an ARB, Torsemide, Coreg, history of depression maintained on daily Cymbalta, type 2 DM on insulin with apparent complicating retinopathy and neuropathy, history of gout, several year history of neck pain with past MRI with cervical stenosis, with the patient sent in from an outpatient office (he does not know the physician who referred him to the ER) with patient reporting his FS of 480 this AM and he administered his short acting insulin, but with an episode of nausea with episodes of HA and LEFT sided neck cramping, worse with movement of the neck, improved with rest and recumbency.  No chest pain/pressure.  No palpitations.  Patient admits to poor exercise tolerance and self limits his activity.  No N/V at present and wishes a regular diet.  No focal weakness.  NO tearing neck pain.  NO back pain, no tearing back pain.      Patient reports receiving Pfizer COVID-19 vaccine x 2. (06 Dec 2021 16:32)      PAST MEDICAL & SURGICAL HISTORY:  Diabetes Mellitus Type II, Uncontrolled    Dyslipidemia    s/p Angioplasty with Stent    HTN (hypertension)    Gout    Diabetic retinopathy    GERD (gastroesophageal reflux disease)    Nephrolithiasis    Vitamin D deficiency    Hepatitis    CKD (chronic kidney disease)    Ischemic cardiomyopathy    ASHD (arteriosclerotic heart disease)    Pulmonary hypertension    Myocardial infarction    CAD (coronary artery disease)    BPH (benign prostatic hyperplasia)    Retinal Hemorrhage    S/P coronary artery stent placement   TRICIA to Diag ; 2010  LAD; 11 ATOM liberte Diag; 5/15/2017  TRICIA to  diag; 17 PCI with laser and high pressure balloon    History of eye surgery  left eye    H/O lithotripsy    Diabetes with retinopathy  S/P PPV/PRP/GAS  OD 17 for viterous hemorrhage    CHF with cardiomyopathy  Insertion of Cardiomems monitor    Stented coronary artery        MEDICATIONS  (STANDING):  allopurinol 300 milliGRAM(s) Oral daily  aspirin enteric coated 81 milliGRAM(s) Oral daily  atorvastatin 80 milliGRAM(s) Oral at bedtime  carvedilol 25 milliGRAM(s) Oral every 12 hours  dextrose 40% Gel 15 Gram(s) Oral once  dextrose 5%. 1000 milliLiter(s) (50 mL/Hr) IV Continuous <Continuous>  dextrose 5%. 1000 milliLiter(s) (100 mL/Hr) IV Continuous <Continuous>  dextrose 50% Injectable 25 Gram(s) IV Push once  dextrose 50% Injectable 12.5 Gram(s) IV Push once  dextrose 50% Injectable 25 Gram(s) IV Push once  DULoxetine 30 milliGRAM(s) Oral daily  famotidine    Tablet 20 milliGRAM(s) Oral daily  glucagon  Injectable 1 milliGRAM(s) IntraMuscular once  heparin   Injectable 5000 Unit(s) SubCutaneous every 8 hours  influenza   Vaccine 0.5 milliLiter(s) IntraMuscular once  insulin glargine Injectable (LANTUS) 25 Unit(s) SubCutaneous at bedtime  insulin lispro (ADMELOG) corrective regimen sliding scale   SubCutaneous three times a day before meals  insulin lispro (ADMELOG) corrective regimen sliding scale   SubCutaneous at bedtime  insulin lispro Injectable (ADMELOG) 8 Unit(s) SubCutaneous three times a day before meals  tamsulosin 0.4 milliGRAM(s) Oral at bedtime  ticagrelor 90 milliGRAM(s) Oral every 12 hours      Allergies    No Known Allergies    Intolerances        SOCIAL HISTORY:  Denies alcohol abuse, drug abuse or tobacco usage.     FAMILY HISTORY:  Family history of cardiac disorder in father (Father)        VITALS:  T(C): 36.5 (21 @ 12:55), Max: 36.7 (21 @ 19:20)  HR: 70 (21 @ 12:55) (70 - 94)  BP: 117/70 (21 @ 12:55) (101/70 - 120/75)  RR: 18 (21 @ 12:55) (17 - 18)  SpO2: 97% (21 @ 12:55) (97% - 100%)    REVIEW OF SYSTEMS:  Denies any nausea, vomiting, diarrhea, fever or chills. Denies chest pain, SOB, focal weakness, hematuria or dysuria. Good oral intake and denies fatigue or weakness. All other pertinent systems are reviewed and are negative.    PHYSICAL EXAM:  Constitutional: NAD  HEENT: EOMI  Neck:  No JVD, supple   Respiratory: CTA B/L  Cardiovascular: S1 and S2, RRR  Gastrointestinal: + BS, soft, NT, ND  Extremities: No peripheral edema, + peripheral pulses  Neurological: A/O x 3, CN2-12 intact  Psychiatric: Normal mood, normal affect  : No Jay  Skin: No rashes, C/D/I  Access: Not applicable    I and O's:     @ 07:01  -   @ 07:00  --------------------------------------------------------  IN: 120 mL / OUT: 600 mL / NET: -480 mL     @ 07:01  -   @ 13:22  --------------------------------------------------------  IN: 240 mL / OUT: 0 mL / NET: 240 mL        Weight (kg): 83.1 ( @ 23:35)    LABS:                        10.5   6.07  )-----------( 215      ( 07 Dec 2021 05:29 )             31.4         130<L>  |  98  |  85<H>  ----------------------------<  307<H>  4.3   |  17<L>  |  4.24<H>    Ca    8.5      07 Dec 2021 05:29  Phos  4.8       Mg     2.6         TPro  7.0  /  Alb  4.2  /  TBili  0.4  /  DBili  x   /  AST  14  /  ALT  17  /  AlkPhos  125<H>        URINE:  Urinalysis Basic - ( 06 Dec 2021 20:12 )    Color: Light Yellow / Appearance: Slightly Turbid / S.010 / pH: x  Gluc: x / Ketone: Negative  / Bili: Negative / Urobili: Negative   Blood: x / Protein: 300 mg/dL / Nitrite: Negative   Leuk Esterase: Negative / RBC: 1 /hpf / WBC 4 /HPF   Sq Epi: x / Non Sq Epi: 5 /hpf / Bacteria: FEW        RADIOLOGY & ADDITIONAL STUDIES:     NEPHROLOGY - NSN    Patient seen and examined.    HPI:  NIGHT HOSPITALIST:   Patient UNKNOWN to me previously, assigned to me at this point via the ER to admit this 62 y/o M--followed by his physicians above but apparently with poor compliance with followup--last discharged from [facility] in 2018--patient with a history of CAD S/P past PCI on dual antiplatelet therapy, reported ischaemic cardiomyopathy with moderate LV dysfunction (46%) with s/P Cardiomems, chronic kidney disease last Cr in 2018, essential HTN maintained on an ARB, Torsemide, Coreg, history of depression maintained on daily Cymbalta, type 2 DM on insulin with apparent complicating retinopathy and neuropathy, history of gout, several year history of neck pain with past MRI with cervical stenosis, with the patient sent in from an outpatient office (he does not know the physician who referred him to the ER) with patient reporting his FS of 480 this AM and he administered his short acting insulin, but with an episode of nausea with episodes of HA and LEFT sided neck cramping, worse with movement of the neck, improved with rest and recumbency.  No chest pain/pressure.  No palpitations.  Patient admits to poor exercise tolerance and self limits his activity.  No N/V at present and wishes a regular diet.  No focal weakness.  NO tearing neck pain.  NO back pain, no tearing back pain.      Patient reports receiving Pfizer COVID-19 vaccine x 2. (06 Dec 2021 16:32)      PAST MEDICAL & SURGICAL HISTORY:  Diabetes Mellitus Type II, Uncontrolled    Dyslipidemia    s/p Angioplasty with Stent    HTN (hypertension)    Gout    Diabetic retinopathy    GERD (gastroesophageal reflux disease)    Nephrolithiasis    Vitamin D deficiency    Hepatitis    CKD (chronic kidney disease)    Ischemic cardiomyopathy    ASHD (arteriosclerotic heart disease)    Pulmonary hypertension    Myocardial infarction    CAD (coronary artery disease)    BPH (benign prostatic hyperplasia)    Retinal Hemorrhage    S/P coronary artery stent placement   TRICIA to Diag ; 2010  LAD; 11 ATOM liberte Diag; 5/15/2017  TRICIA to  diag; 17 PCI with laser and high pressure balloon    History of eye surgery  left eye    H/O lithotripsy    Diabetes with retinopathy  S/P PPV/PRP/GAS  OD 17 for viterous hemorrhage    CHF with cardiomyopathy  Insertion of Cardiomems monitor    Stented coronary artery        MEDICATIONS  (STANDING):  allopurinol 300 milliGRAM(s) Oral daily  aspirin enteric coated 81 milliGRAM(s) Oral daily  atorvastatin 80 milliGRAM(s) Oral at bedtime  carvedilol 25 milliGRAM(s) Oral every 12 hours  dextrose 40% Gel 15 Gram(s) Oral once  dextrose 5%. 1000 milliLiter(s) (50 mL/Hr) IV Continuous <Continuous>  dextrose 5%. 1000 milliLiter(s) (100 mL/Hr) IV Continuous <Continuous>  dextrose 50% Injectable 25 Gram(s) IV Push once  dextrose 50% Injectable 12.5 Gram(s) IV Push once  dextrose 50% Injectable 25 Gram(s) IV Push once  DULoxetine 30 milliGRAM(s) Oral daily  famotidine    Tablet 20 milliGRAM(s) Oral daily  glucagon  Injectable 1 milliGRAM(s) IntraMuscular once  heparin   Injectable 5000 Unit(s) SubCutaneous every 8 hours  influenza   Vaccine 0.5 milliLiter(s) IntraMuscular once  insulin glargine Injectable (LANTUS) 25 Unit(s) SubCutaneous at bedtime  insulin lispro (ADMELOG) corrective regimen sliding scale   SubCutaneous three times a day before meals  insulin lispro (ADMELOG) corrective regimen sliding scale   SubCutaneous at bedtime  insulin lispro Injectable (ADMELOG) 8 Unit(s) SubCutaneous three times a day before meals  tamsulosin 0.4 milliGRAM(s) Oral at bedtime  ticagrelor 90 milliGRAM(s) Oral every 12 hours      Allergies    No Known Allergies    Intolerances        SOCIAL HISTORY:  Denies alcohol abuse, drug abuse or tobacco usage.     FAMILY HISTORY:  Family history of cardiac disorder in father (Father)        VITALS:  T(C): 36.5 (21 @ 12:55), Max: 36.7 (21 @ 19:20)  HR: 70 (21 @ 12:55) (70 - 94)  BP: 117/70 (21 @ 12:55) (101/70 - 120/75)  RR: 18 (21 @ 12:55) (17 - 18)  SpO2: 97% (21 @ 12:55) (97% - 100%)    REVIEW OF SYSTEMS:  Denies any nausea, vomiting, diarrhea, fever or chills. Denies chest pain, SOB, focal weakness, hematuria or dysuria. Good oral intake and denies fatigue or weakness. All other pertinent systems are reviewed and are negative.    PHYSICAL EXAM:  Constitutional: NAD  HEENT: EOMI  Neck:  No JVD, supple   Respiratory: CTA B/L  Cardiovascular: S1 and S2, RRR  Gastrointestinal: + BS, soft, NT, ND  Extremities: No peripheral edema, + peripheral pulses  Neurological: A/O x 3, CN2-12 intact  Psychiatric: Normal mood, normal affect  : No Jay  Skin: No rashes, C/D/I  Access: Not applicable    I and O's:     @ 07:01  -   @ 07:00  --------------------------------------------------------  IN: 120 mL / OUT: 600 mL / NET: -480 mL     @ 07:01  -   @ 13:22  --------------------------------------------------------  IN: 240 mL / OUT: 0 mL / NET: 240 mL        Weight (kg): 83.1 ( @ 23:35)    LABS:                        10.5   6.07  )-----------( 215      ( 07 Dec 2021 05:29 )             31.4         130<L>  |  98  |  85<H>  ----------------------------<  307<H>  4.3   |  17<L>  |  4.24<H>    Ca    8.5      07 Dec 2021 05:29  Phos  4.8       Mg     2.6         TPro  7.0  /  Alb  4.2  /  TBili  0.4  /  DBili  x   /  AST  14  /  ALT  17  /  AlkPhos  125<H>        URINE:  Urinalysis Basic - ( 06 Dec 2021 20:12 )    Color: Light Yellow / Appearance: Slightly Turbid / S.010 / pH: x  Gluc: x / Ketone: Negative  / Bili: Negative / Urobili: Negative   Blood: x / Protein: 300 mg/dL / Nitrite: Negative   Leuk Esterase: Negative / RBC: 1 /hpf / WBC 4 /HPF   Sq Epi: x / Non Sq Epi: 5 /hpf / Bacteria: FEW        RADIOLOGY & ADDITIONAL STUDIES:    < from: US Renal (21 @ 13:36) >    EXAM:  US KIDNEY(S)                            PROCEDURE DATE:  2021            INTERPRETATION:  CLINICAL INFORMATION: Chronic kidney disease. Poorly controlled diabetes mellitus. Creatinine 4.24    COMPARISON: Renal artery doppler and ultrasound from 2018.    TECHNIQUE: Sonography of the kidneys and bladder.    FINDINGS:    Right kidney: 10.8 cm. Markedly increased cortical echogenicity. No hydronephrosis.    Left kidney: 12.7 cm. Markedly increased cortical echogenicity. No hydronephrosis.    Urinary bladder: Intraluminal debris.    IMPRESSION:    Echogenic renal cortex which may be secondary to medical kidney disease. No hydronephrosis.    Debris is noted within the urinary bladder..  Correlation with urinalysis is suggested.        --- End of Report ---          < from: Transthoracic Echocardiogram (21 @ 11:24) >    Patient name: ADAM KOWALSKI  YOB: 1960   Age: 61 (M)   MR#: [id number]  Study Date: 2021  [facility]  Study quality: Technically difficult  Referring Physician: Lilian Mccullough MD  Blood Pressure: 110/68 mmHg  Height: 170 cm  Weight: 82 kg  BSA: 1.9 m2  ------------------------------------------------------------------------  PROCEDURE: Transthoracic echocardiogram with 2-D, M-Mode  and complete spectral and color flow Doppler.  INDICATION: Abnormal electrocardiogram (ECG) (EKG) (R94.31)  ------------------------------------------------------------------------  Dimensions:    Normal Values:  LA:     3.1    2.0 - 4.0 cm  Ao:     3.1    2.0 - 3.8 cm  SEPTUM: 1.0    0.6 - 1.2 cm  PWT:0.8    0.6 - 1.1 cm  LVIDd:  3.9    3.0 - 5.6 cm  LVIDs:  2.8    1.8 - 4.0 cm  Derived variables:  LVMI: 55 g/m2  RWT: 0.41  Fractional short: 28 %  EF (Visual Estimate): 40-45 %  Doppler Peak Velocity (m/sec): AoV=1.1  ------------------------------------------------------------------------  Observations:  Mitral Valve: Normal mitral valve. Minimal mitral  regurgitation.  Aortic Valve/Aorta: Calcified trileaflet aortic valve with  normal opening. Peak transaortic valve gradient equals 5 mm  Hg, mean transaortic valve gradient equals 3 mm Hg, aortic  valve velocity time integral equals 20 cm. Minimal aortic  regurgitation. Peak left ventricular outflow tract gradient  equals 3 mm Hg, mean gradient is equal to 1 mm Hg, LVOT  velocity time integral equals 14 cm.  Aortic Root: 3.1 cm.  Left Atrium: Normal left atrium.  LA volume index = 20  cc/m2.  Left Ventricle: Mild segmental left ventricular systolic  dysfunction. The basal inferior, basal septum, distal  septum, distal inferior, and theapex are akinetic.  Recommend limited repeat imaging with intravenous echo  contrast to better visualize the apex. (Per sonographer,  patient did not agree to echo contrast during the study.)  Normal left ventricular internal dimensions and wall  thickness. Mild diastolic dysfunction (Stage I).  Right Heart: Normal right atrium. Normal right ventricular  size and function. Normal tricuspid valve. Minimal  tricuspid regurgitation. Normal pulmonic valve. Minimal  pulmonic regurgitation.  Pericardium/Pleura: Normal pericardium with no pericardial  effusion.  Hemodynamic: Estimated right atrial pressure equals 3 mmHg.  Inadequate tricuspid regurgitation Doppler envelope  precludes estimation of RVSP.  ------------------------------------------------------------------------  Conclusions:  1. Normal mitral valve. Minimal mitral regurgitation.  2. Calcified trileaflet aortic valve with normal opening.  Minimal aortic regurgitation.  3. Mild segmental left ventricular systolic dysfunction.  The basal inferior, basal septum, distal septum, distal  inferior, and the apex are akinetic. Recommend limited  repeat imaging with intravenous echo contrast to better  visualize the apex. (Per sonographer, patient did not agree  to echo contrast during the study.)  4. Mild diastolic dysfunction (Stage I).  5. Normal right ventricular size and function.  *** No previous Echo exam.  ------------------------------------------------------------------------  Confirmed on  2021 - 13:34:25 by Derdick Smith M.D.  ------------------------------------------------------------------------    < end of copied text >      MIKAELA MÉNDEZ MD; Resident Radiology  This document has been electronically signed.  PETRONA PHILLIP MD; Attending Radiologist  This document has been electronically signed. Dec  7 2021  2:07PM    < end of copied text >   NEPHROLOGY - NSN    Patient seen and examined.    HPI:      60 y/o M--followed by his physicians above but apparently with poor compliance with followup--last discharged from [facility] in 2018--patient with a history of CAD S/P past PCI on dual antiplatelet therapy, reported ischaemic cardiomyopathy with moderate LV dysfunction (46%) with s/P Cardiomems, chronic kidney disease last Cr in 2018, essential HTN maintained on an ARB, Torsemide, Coreg, history of depression maintained on daily Cymbalta, type 2 DM on insulin with apparent complicating retinopathy and neuropathy, history of gout, several year history of neck pain with past MRI with cervical stenosis, with the patient sent in from an outpatient office (he does not know the physician who referred him to the ER) with patient reporting his FS of 480 this AM and he administered his short acting insulin, but with an episode of nausea with episodes of HA and LEFT sided neck cramping, worse with movement of the neck, improved with rest and recumbency.  No chest pain/pressure.  No palpitations.  Patient admits to poor exercise tolerance and self limits his activity.  No N/V at present and wishes a regular diet.  No focal weakness.  NO tearing neck pain.  NO back pain, no tearing back pain.      Patient reports receiving Pfizer COVID-19 vaccine x 2. (06 Dec 2021 16:32)  He follows with Dr Plata and was seeing Dr Holley in the past.  He acts like he is unaware of his renal failure and is in denial  In the office he was throwing up   There is no hematuria or bubbles in the urine.  No history of NSAIDS or nephrolithisis.  The patient urinates once or twice in the night and there is no incontinence.  No family hx or renal disease or back pain.    No recent abx use.  No alleviating or aggravating factors with respect to the kidneys.     PAST MEDICAL & SURGICAL HISTORY:  Diabetes Mellitus Type II, Uncontrolled    Dyslipidemia    s/p Angioplasty with Stent    HTN (hypertension)    Gout    Diabetic retinopathy    GERD (gastroesophageal reflux disease)    Nephrolithiasis    Vitamin D deficiency    Hepatitis    CKD (chronic kidney disease)    Ischemic cardiomyopathy    ASHD (arteriosclerotic heart disease)    Pulmonary hypertension    Myocardial infarction    CAD (coronary artery disease)    BPH (benign prostatic hyperplasia)    Retinal Hemorrhage    S/P coronary artery stent placement   TRICIA to Diag ; 2010  LAD; 11 ATOM liberte Diag; 5/15/2017  TRICIA to  diag; 17 PCI with laser and high pressure balloon    History of eye surgery  left eye    H/O lithotripsy    Diabetes with retinopathy  S/P PPV/PRP/GAS  OD 17 for viterous hemorrhage    CHF with cardiomyopathy  Insertion of Cardiomems monitor    Stented coronary artery        MEDICATIONS  (STANDING):  allopurinol 300 milliGRAM(s) Oral daily  aspirin enteric coated 81 milliGRAM(s) Oral daily  atorvastatin 80 milliGRAM(s) Oral at bedtime  carvedilol 25 milliGRAM(s) Oral every 12 hours  dextrose 40% Gel 15 Gram(s) Oral once  dextrose 5%. 1000 milliLiter(s) (50 mL/Hr) IV Continuous <Continuous>  dextrose 5%. 1000 milliLiter(s) (100 mL/Hr) IV Continuous <Continuous>  dextrose 50% Injectable 25 Gram(s) IV Push once  dextrose 50% Injectable 12.5 Gram(s) IV Push once  dextrose 50% Injectable 25 Gram(s) IV Push once  DULoxetine 30 milliGRAM(s) Oral daily  famotidine    Tablet 20 milliGRAM(s) Oral daily  glucagon  Injectable 1 milliGRAM(s) IntraMuscular once  heparin   Injectable 5000 Unit(s) SubCutaneous every 8 hours  influenza   Vaccine 0.5 milliLiter(s) IntraMuscular once  insulin glargine Injectable (LANTUS) 25 Unit(s) SubCutaneous at bedtime  insulin lispro (ADMELOG) corrective regimen sliding scale   SubCutaneous three times a day before meals  insulin lispro (ADMELOG) corrective regimen sliding scale   SubCutaneous at bedtime  insulin lispro Injectable (ADMELOG) 8 Unit(s) SubCutaneous three times a day before meals  tamsulosin 0.4 milliGRAM(s) Oral at bedtime  ticagrelor 90 milliGRAM(s) Oral every 12 hours      Allergies    No Known Allergies    Intolerances        SOCIAL HISTORY:  Denies alcohol abuse, drug abuse or tobacco usage.     FAMILY HISTORY:  Family history of cardiac disorder in father (Father)        VITALS:  T(C): 36.5 (21 @ 12:55), Max: 36.7 (21 @ 19:20)  HR: 70 (21 @ 12:55) (70 - 94)  BP: 117/70 (21 @ 12:55) (101/70 - 120/75)  RR: 18 (21 @ 12:55) (17 - 18)  SpO2: 97% (21 @ 12:55) (97% - 100%)    REVIEW OF SYSTEMS:  + nausea, vomiting, d . Denies chest pain, SOB, focal weakness, hematuria or dysuria. Good oral intake and denies fatigue or weakness. All other pertinent systems are reviewed and are negative.    PHYSICAL EXAM:  Constitutional: NAD  HEENT: EOMI  Neck:  No JVD, supple   Respiratory: CTA B/L  Cardiovascular: S1 and S2, RRR  Gastrointestinal: + BS, soft, NT, ND  Extremities: No peripheral edema, + peripheral pulses  Neurological: A/O x 3, CN2-12 intact  Psychiatric: Normal mood, normal affect  : No Jay  Skin: No rashes, C/D/I  Access: Not applicable    I and O's:     @ 07:01  -   @ 07:00  --------------------------------------------------------  IN: 120 mL / OUT: 600 mL / NET: -480 mL     @ 07:01  -   @ 13:22  --------------------------------------------------------  IN: 240 mL / OUT: 0 mL / NET: 240 mL        Weight (kg): 83.1 ( @ 23:35)    LABS:                        10.5   6.07  )-----------( 215      ( 07 Dec 2021 05:29 )             31.4         130<L>  |  98  |  85<H>  ----------------------------<  307<H>  4.3   |  17<L>  |  4.24<H>    Ca    8.5      07 Dec 2021 05:29  Phos  4.8       Mg     2.6         TPro  7.0  /  Alb  4.2  /  TBili  0.4  /  DBili  x   /  AST  14  /  ALT  17  /  AlkPhos  125<H>  12-06      URINE:  Urinalysis Basic - ( 06 Dec 2021 20:12 )    Color: Light Yellow / Appearance: Slightly Turbid / S.010 / pH: x  Gluc: x / Ketone: Negative  / Bili: Negative / Urobili: Negative   Blood: x / Protein: 300 mg/dL / Nitrite: Negative   Leuk Esterase: Negative / RBC: 1 /hpf / WBC 4 /HPF   Sq Epi: x / Non Sq Epi: 5 /hpf / Bacteria: FEW        RADIOLOGY & ADDITIONAL STUDIES:    < from: US Renal (21 @ 13:36) >    EXAM:  US KIDNEY(S)                            PROCEDURE DATE:  2021            INTERPRETATION:  CLINICAL INFORMATION: Chronic kidney disease. Poorly controlled diabetes mellitus. Creatinine 4.24    COMPARISON: Renal artery doppler and ultrasound from 2018.    TECHNIQUE: Sonography of the kidneys and bladder.    FINDINGS:    Right kidney: 10.8 cm. Markedly increased cortical echogenicity. No hydronephrosis.    Left kidney: 12.7 cm. Markedly increased cortical echogenicity. No hydronephrosis.    Urinary bladder: Intraluminal debris.    IMPRESSION:    Echogenic renal cortex which may be secondary to medical kidney disease. No hydronephrosis.    Debris is noted within the urinary bladder..  Correlation with urinalysis is suggested.        --- End of Report ---          < from: Transthoracic Echocardiogram (21 @ 11:24) >    Patient name: ADAM KOWALSKI  YOB: 1960   Age: 61 (M)   MR#: [id number]  Study Date: 2021  [facility]  Study quality: Technically difficult  Referring Physician: Lilian Mccullough MD  Blood Pressure: 110/68 mmHg  Height: 170 cm  Weight: 82 kg  BSA: 1.9 m2  ------------------------------------------------------------------------  PROCEDURE: Transthoracic echocardiogram with 2-D, M-Mode  and complete spectral and color flow Doppler.  INDICATION: Abnormal electrocardiogram (ECG) (EKG) (R94.31)  ------------------------------------------------------------------------  Dimensions:    Normal Values:  LA:     3.1    2.0 - 4.0 cm  Ao:     3.1    2.0 - 3.8 cm  SEPTUM: 1.0    0.6 - 1.2 cm  PWT:0.8    0.6 - 1.1 cm  LVIDd:  3.9    3.0 - 5.6 cm  LVIDs:  2.8    1.8 - 4.0 cm  Derived variables:  LVMI: 55 g/m2  RWT: 0.41  Fractional short: 28 %  EF (Visual Estimate): 40-45 %  Doppler Peak Velocity (m/sec): AoV=1.1  ------------------------------------------------------------------------  Observations:  Mitral Valve: Normal mitral valve. Minimal mitral  regurgitation.  Aortic Valve/Aorta: Calcified trileaflet aortic valve with  normal opening. Peak transaortic valve gradient equals 5 mm  Hg, mean transaortic valve gradient equals 3 mm Hg, aortic  valve velocity time integral equals 20 cm. Minimal aortic  regurgitation. Peak left ventricular outflow tract gradient  equals 3 mm Hg, mean gradient is equal to 1 mm Hg, LVOT  velocity time integral equals 14 cm.  Aortic Root: 3.1 cm.  Left Atrium: Normal left atrium.  LA volume index = 20  cc/m2.  Left Ventricle: Mild segmental left ventricular systolic  dysfunction. The basal inferior, basal septum, distal  septum, distal inferior, and theapex are akinetic.  Recommend limited repeat imaging with intravenous echo  contrast to better visualize the apex. (Per sonographer,  patient did not agree to echo contrast during the study.)  Normal left ventricular internal dimensions and wall  thickness. Mild diastolic dysfunction (Stage I).  Right Heart: Normal right atrium. Normal right ventricular  size and function. Normal tricuspid valve. Minimal  tricuspid regurgitation. Normal pulmonic valve. Minimal  pulmonic regurgitation.  Pericardium/Pleura: Normal pericardium with no pericardial  effusion.  Hemodynamic: Estimated right atrial pressure equals 3 mmHg.  Inadequate tricuspid regurgitation Doppler envelope  precludes estimation of RVSP.  ------------------------------------------------------------------------  Conclusions:  1. Normal mitral valve. Minimal mitral regurgitation.  2. Calcified trileaflet aortic valve with normal opening.  Minimal aortic regurgitation.  3. Mild segmental left ventricular systolic dysfunction.  The basal inferior, basal septum, distal septum, distal  inferior, and the apex are akinetic. Recommend limited  repeat imaging with intravenous echo contrast to better  visualize the apex. (Per sonographer, patient did not agree  to echo contrast during the study.)  4. Mild diastolic dysfunction (Stage I).  5. Normal right ventricular size and function.  *** No previous Echo exam.  ------------------------------------------------------------------------  Confirmed on  2021 - 13:34:25 by Dedrick Smith M.D.  ------------------------------------------------------------------------    < end of copied text >      MIKAELA MÉNDEZ MD; Resident Radiology  This document has been electronically signed.  PETRONA PHILLIP MD; Attending Radiologist  This document has been electronically signed. Dec  7 2021  2:07PM    < end of copied text >

## 2021-12-07 NOTE — CONSULT NOTE ADULT - ASSESSMENT
62 yo male c DM HTN decreased EF pw nausea  This could all be related to uremia at present.  Pt has had noncompliance with is health in general   Hyponatremia   Metabolic acidosis   Anemia   Double digit SUD2E--[place] blood sugars     1 Renal- If he is not having persistent nausea then can likely dc with close outpt follow up which entails AVF ASAP   (Pt is not very educated re his health and will need a lot of coaching)  There is a high likely roberts of HD in the next 6 months   2 [facility]-Cardiac cath p he starts HD  3 Anemia-Check iron stores   4 Vasc-Outpt AVF        Sayed [facility]   [phone]

## 2021-12-07 NOTE — CONSULT NOTE ADULT - ATTENDING COMMENTS
Pt care and plan discussed and reviewed with NP. Plan as outlined above edited by me to reflect our discussion.  Patient expresses clear understanding and satisfaction with the plan of care. OMT on six regions for acute somatic dysfunctions done at the bedside. One hundred ten minutes spent on encounter, of which more than fifty percent of the encounter was spent on counseling and/or coordinating care, retrieving and reviewing outside records from [facility], by the attending physician.

## 2021-12-07 NOTE — CONSULT NOTE ADULT - SUBJECTIVE AND OBJECTIVE BOX
Date of Service :   12-07-21 @ 15:05  CHIEF COMPLAINT:Patient is a 61y old  Male who presents with a chief complaint of Sent in from an outpatient office for apparent FS of 480 with episode of nausea. (07 Dec 2021 13:45)      HISTORY OF PRESENT ILLNESS:HPI:  NIGHT HOSPITALIST:   Patient UNKNOWN to me previously, assigned to me at this point via the ER to admit this 60 y/o M--followed by his physicians above but apparently with poor compliance with followup--last discharged from [facility] in July 2018--patient with a history of CAD S/P past PCI on dual antiplatelet therapy, reported ischaemic cardiomyopathy with moderate LV dysfunction (46%) with s/P Cardiomems, chronic kidney disease last Cr in 2018, essential HTN maintained on an ARB, Torsemide, Coreg, history of depression maintained on daily Cymbalta, type 2 DM on insulin with apparent complicating retinopathy and neuropathy, history of gout, several year history of neck pain with past MRI with cervical stenosis, with the patient sent in from an outpatient office (he does not know the physician who referred him to the ER) with patient reporting his FS of 480 this AM and he administered his short acting insulin, but with an episode of nausea with episodes of HA and LEFT sided neck cramping, worse with movement of the neck, improved with rest and recumbency.  No chest pain/pressure.  No palpitations.  Patient admits to poor exercise tolerance and self limits his activity.  No N/V at present and wishes a regular diet.  No focal weakness.  NO tearing neck pain.  NO back pain, no tearing back pain.      Patient reports receiving Pfizer COVID-19 vaccine x 2. (06 Dec 2021 16:32)      PAST MEDICAL & SURGICAL HISTORY:  Diabetes Mellitus Type II, Uncontrolled    Dyslipidemia    s/p Angioplasty with Stent    HTN (hypertension)    Gout    Diabetic retinopathy    GERD (gastroesophageal reflux disease)    Nephrolithiasis    Vitamin D deficiency    Hepatitis    CKD (chronic kidney disease)    Ischemic cardiomyopathy    ASHD (arteriosclerotic heart disease)    Pulmonary hypertension    Myocardial infarction    CAD (coronary artery disease)    BPH (benign prostatic hyperplasia)    Retinal Hemorrhage    S/P coronary artery stent placement  2010 RTICIA to Diag ; 11/18/2010  LAD; 2/4/11 ATOM liberte Diag; 5/15/2017  TRICIA to 1st diag; 6/7/17 PCI with laser and high pressure balloon    History of eye surgery  left eye    H/O lithotripsy    Diabetes with retinopathy  S/P PPV/PRP/GAS  OD 2/22/17 for viterous hemorrhage    CHF with cardiomyopathy  Insertion of Cardiomems monitor    Stented coronary artery            MEDICATIONS:  aspirin enteric coated 81 milliGRAM(s) Oral daily  carvedilol 25 milliGRAM(s) Oral every 12 hours  heparin   Injectable 5000 Unit(s) SubCutaneous every 8 hours  tamsulosin 0.4 milliGRAM(s) Oral at bedtime  ticagrelor 90 milliGRAM(s) Oral every 12 hours        acetaminophen     Tablet .. 650 milliGRAM(s) Oral every 6 hours PRN  DULoxetine 30 milliGRAM(s) Oral daily  gabapentin 300 milliGRAM(s) Oral two times a day    famotidine    Tablet 20 milliGRAM(s) Oral daily    allopurinol 300 milliGRAM(s) Oral daily  atorvastatin 80 milliGRAM(s) Oral at bedtime  dextrose 40% Gel 15 Gram(s) Oral once  dextrose 50% Injectable 25 Gram(s) IV Push once  dextrose 50% Injectable 12.5 Gram(s) IV Push once  dextrose 50% Injectable 25 Gram(s) IV Push once  glucagon  Injectable 1 milliGRAM(s) IntraMuscular once  insulin glargine Injectable (LANTUS) 25 Unit(s) SubCutaneous at bedtime  insulin lispro (ADMELOG) corrective regimen sliding scale   SubCutaneous three times a day before meals  insulin lispro (ADMELOG) corrective regimen sliding scale   SubCutaneous at bedtime  insulin lispro Injectable (ADMELOG) 8 Unit(s) SubCutaneous three times a day before meals    dextrose 5%. 1000 milliLiter(s) IV Continuous <Continuous>  dextrose 5%. 1000 milliLiter(s) IV Continuous <Continuous>  influenza   Vaccine 0.5 milliLiter(s) IntraMuscular once      FAMILY HISTORY:  Family history of cardiac disorder in father (Father)        Non-contributory    SOCIAL HISTORY:    [ ] Tobacco  [ ] Drugs  [ ] Alcohol    Allergies    No Known Allergies    Intolerances    	    REVIEW OF SYSTEMS:  CONSTITUTIONAL: No fever  EYES: No eye pain, visual disturbances, or discharge  ENMT:  No difficulty hearing, tinnitus  NECK: No pain or stiffness  RESPIRATORY: No cough, wheezing,  CARDIOVASCULAR: No chest pain, palpitations, passing out, dizziness, or leg swelling  GASTROINTESTINAL:  No nausea, vomiting, diarrhea or constipation. No melena.  GENITOURINARY: No dysuria, hematuria  NEUROLOGICAL: No stroke like symptoms  SKIN: No burning or lesions   ENDOCRINE: No heat or cold intolerance  MUSCULOSKELETAL: No joint pain or swelling  PSYCHIATRIC: No  anxiety, mood swings  HEME/LYMPH: No bleeding gums  ALLERGY AND IMMUNOLOGIC: No hives or eczema	    All other ROS negative    PHYSICAL EXAM:  T(C): 36.5 (12-07-21 @ 12:55), Max: 36.7 (12-06-21 @ 19:20)  HR: 70 (12-07-21 @ 12:55) (70 - 94)  BP: 117/70 (12-07-21 @ 12:55) (101/70 - 120/75)  RR: 18 (12-07-21 @ 12:55) (17 - 18)  SpO2: 97% (12-07-21 @ 12:55) (97% - 100%)  Wt(kg): --  I&O's Summary    06 Dec 2021 07:01  -  07 Dec 2021 07:00  --------------------------------------------------------  IN: 120 mL / OUT: 600 mL / NET: -480 mL    07 Dec 2021 07:01  -  07 Dec 2021 15:05  --------------------------------------------------------  IN: 240 mL / OUT: 0 mL / NET: 240 mL        Appearance: Normal	  HEENT:   Normal oral mucosa, EOMI	  Cardiovascular:  S1 S2, No JVD,    Respiratory: Lungs clear to auscultation	  Psychiatry: Alert  Gastrointestinal:  Soft, Non-tender, + BS	  Skin: No rashes   Neurologic: Non-focal  Extremities:  No edema  Vascular: Peripheral pulses palpable    	    	  	  CARDIAC MARKERS:  Labs personally reviewed by me                                  10.5   6.07  )-----------( 215      ( 07 Dec 2021 05:29 )             31.4     12-07    130<L>  |  98  |  85<H>  ----------------------------<  307<H>  4.3   |  17<L>  |  4.24<H>    Ca    8.5      07 Dec 2021 05:29  Phos  4.8     12-06  Mg     2.6     12-06    TPro  7.0  /  Alb  4.2  /  TBili  0.4  /  DBili  x   /  AST  14  /  ALT  17  /  AlkPhos  125<H>  12-06          EKG: Personally reviewed by me - 77 bpm SR w/PVC       Radiology: Personally reviewed by me -   < from: US Renal (12.07.21 @ 13:36) >  Echogenic renal cortex which may be secondary to medical kidney disease. No hydronephrosis.    Debris is noted within the urinary bladder..  Correlation with urinalysis is suggested.      < end of copied text >  < from: CT Head No Cont (12.06.21 @ 18:14) >    BRAIN CT : No intracranial hemorrhage. No major vessel distribution infarct.  CERVICAL SPINE CT: No acute fracture or traumatic subluxation  .  < from: Transthoracic Echocardiogram (12.07.21 @ 11:24) >  EF (Visual Estimate): 40-45 %    < end of copied text >  < from: Transthoracic Echocardiogram (12.07.21 @ 11:24) >  1. Normal mitral valve. Minimal mitral regurgitation.  2. Calcified trileaflet aortic valve with normal opening.  Minimal aortic regurgitation.  3. Mild segmental left ventricular systolic dysfunction.  The basal inferior, basal septum, distal septum, distal  inferior, and the apex are akinetic. Recommend limited  repeat imaging with intravenous echo contrast to better  visualize the apex. (Per sonographer, patient did not agree  to echo contrast during the study.)  4. Mild diastolic dysfunction (Stage I).  5. Normal right ventricular size and function.    < end of copied text >    < from: Transthoracic Echocardiogram (07.27.18 @ 07:05) >  . Mild segmental left ventricular systolic dysfunction.  Hypokinesis of apex, distal septum and distal inferior  wall. Global LV function is perserved.  2. Mild diastolic dysfunction (Stage I).  3. Normal right ventricular size and function.    < end of copied text >    Assessment /Plan:     Patient is a 61 year old female with a history of CAD S/P past PCI on dual antiplatelet therapy, ischemic cardiomyopathy with moderate LV dysfunction (46%) with s/p cardiomems, chronic kidney disease last Cr in 2018, essential HTN, history of depression, type 2 DM on insulin with apparent complicating retinopathy and neuropathy, history of gout, several year history of neck pain with past MRI with cervical stenosis, with the patient sent in from an outpatient office (he does not know the physician who referred him to the ER) with hyperglycemia.    Problem/Plan - 1:  ·  Problem: : Elevated  Troponin likely in the setting of CKD  - EKG noted   - Trops have peaked at 120, latest one 104  - CKMB 4.2   - TTE revealed -- Mild segmental left ventricular systolic dysfunction. The basal inferior, basal septum, distal septum, distal  inferior, and the apex are akinetic. Mild diastolic dysfunction.    Problem/Plan - 2:  ·  Problem: CAD  hx of PCI   - c/w ASA and Brilinta   - c/w statin     Problem/Plan - 3:  ·  Problem:  Hypertension.   - BP stable  - c/w coreg q12h.  - ARB and torsemide on hold 2/2 LEYDA     Problem/Plan - 4:  ·  Problem: Type 2 diabetes mellitus with hyperglycemia.   -  ISS   - fs achs  - A1C 11.8     Problem/Plan - 5:  ·  Problem:  Acute kidney injury superimposed on CKD.   - ARB and torsemide on hold   - renal US as noted above   - renal consult    Problem/Plan - 6:  ·  Problem: Hyperlipidemia.    - c/w atorvastatin daily.    Problem/Plan - 7:  ·  Problem: DVT ppx Heparin SQ         Sparkle Barr FNP-BC   Anthony Walters DO [facility]  Cardiovascular Medicine  [address] [place] [address], [address]  Office [phone]  Cell [phone] Date of Service :   12-07-21 @ 15:05  CHIEF COMPLAINT:Patient is a 61y old  Male who presents with a chief complaint of Sent in from an outpatient office for apparent FS of 480 with episode of nausea. (07 Dec 2021 13:45)      HISTORY OF PRESENT ILLNESS:HPI:  NIGHT HOSPITALIST:   Patient UNKNOWN to me previously, assigned to me at this point via the ER to admit this 62 y/o M--followed by his physicians above but apparently with poor compliance with followup--last discharged from [facility] in July 2018--patient with a history of CAD S/P past PCI on dual antiplatelet therapy, reported ischaemic cardiomyopathy with moderate LV dysfunction (46%) with s/P Cardiomems, chronic kidney disease last Cr in 2018, essential HTN maintained on an ARB, Torsemide, Coreg, history of depression maintained on daily Cymbalta, type 2 DM on insulin with apparent complicating retinopathy and neuropathy, history of gout, several year history of neck pain with past MRI with cervical stenosis, with the patient sent in from an outpatient office (he does not know the physician who referred him to the ER) with patient reporting his FS of 480 this AM and he administered his short acting insulin, but with an episode of nausea with episodes of HA and LEFT sided neck cramping, worse with movement of the neck, improved with rest and recumbency.  No chest pain/pressure.  No palpitations.  Patient admits to poor exercise tolerance and self limits his activity.  No N/V at present and wishes a regular diet.  No focal weakness.  NO tearing neck pain.  NO back pain, no tearing back pain.      Patient reports receiving Pfizer COVID-19 vaccine x 2. (06 Dec 2021 16:32)      PAST MEDICAL & SURGICAL HISTORY:  Diabetes Mellitus Type II, Uncontrolled    Dyslipidemia    s/p Angioplasty with Stent    HTN (hypertension)    Gout    Diabetic retinopathy    GERD (gastroesophageal reflux disease)    Nephrolithiasis    Vitamin D deficiency    Hepatitis    CKD (chronic kidney disease)    Ischemic cardiomyopathy    ASHD (arteriosclerotic heart disease)    Pulmonary hypertension    Myocardial infarction    CAD (coronary artery disease)    BPH (benign prostatic hyperplasia)    Retinal Hemorrhage    S/P coronary artery stent placement  2010 TRICIA to Diag ; 11/18/2010  LAD; 2/4/11 ATOM liberte Diag; 5/15/2017  TRICIA to 1st diag; 6/7/17 PCI with laser and high pressure balloon    History of eye surgery  left eye    H/O lithotripsy    Diabetes with retinopathy  S/P PPV/PRP/GAS  OD 2/22/17 for viterous hemorrhage    CHF with cardiomyopathy  Insertion of Cardiomems monitor    Stented coronary artery            MEDICATIONS:  aspirin enteric coated 81 milliGRAM(s) Oral daily  carvedilol 25 milliGRAM(s) Oral every 12 hours  heparin   Injectable 5000 Unit(s) SubCutaneous every 8 hours  tamsulosin 0.4 milliGRAM(s) Oral at bedtime  ticagrelor 90 milliGRAM(s) Oral every 12 hours        acetaminophen     Tablet .. 650 milliGRAM(s) Oral every 6 hours PRN  DULoxetine 30 milliGRAM(s) Oral daily  gabapentin 300 milliGRAM(s) Oral two times a day    famotidine    Tablet 20 milliGRAM(s) Oral daily    allopurinol 300 milliGRAM(s) Oral daily  atorvastatin 80 milliGRAM(s) Oral at bedtime  dextrose 40% Gel 15 Gram(s) Oral once  dextrose 50% Injectable 25 Gram(s) IV Push once  dextrose 50% Injectable 12.5 Gram(s) IV Push once  dextrose 50% Injectable 25 Gram(s) IV Push once  glucagon  Injectable 1 milliGRAM(s) IntraMuscular once  insulin glargine Injectable (LANTUS) 25 Unit(s) SubCutaneous at bedtime  insulin lispro (ADMELOG) corrective regimen sliding scale   SubCutaneous three times a day before meals  insulin lispro (ADMELOG) corrective regimen sliding scale   SubCutaneous at bedtime  insulin lispro Injectable (ADMELOG) 8 Unit(s) SubCutaneous three times a day before meals    dextrose 5%. 1000 milliLiter(s) IV Continuous <Continuous>  dextrose 5%. 1000 milliLiter(s) IV Continuous <Continuous>  influenza   Vaccine 0.5 milliLiter(s) IntraMuscular once      FAMILY HISTORY:  Family history of cardiac disorder in father (Father)        Non-contributory    SOCIAL HISTORY:    [ ] Tobacco  [ ] Drugs  [ ] Alcohol    Allergies    No Known Allergies    Intolerances    	    REVIEW OF SYSTEMS:  CONSTITUTIONAL: No fever  EYES: No eye pain, visual disturbances, or discharge  ENMT:  No difficulty hearing, tinnitus  NECK: No pain or stiffness  RESPIRATORY: No cough, wheezing,  CARDIOVASCULAR: No chest pain, palpitations, passing out, dizziness, or leg swelling  GASTROINTESTINAL:  No nausea, vomiting, diarrhea or constipation. No melena.  GENITOURINARY: No dysuria, hematuria  NEUROLOGICAL: No stroke like symptoms  SKIN: No burning or lesions   ENDOCRINE: No heat or cold intolerance  MUSCULOSKELETAL: No joint pain or swelling  PSYCHIATRIC: No  anxiety, mood swings  HEME/LYMPH: No bleeding gums  ALLERGY AND IMMUNOLOGIC: No hives or eczema	    All other ROS negative    PHYSICAL EXAM:  T(C): 36.5 (12-07-21 @ 12:55), Max: 36.7 (12-06-21 @ 19:20)  HR: 70 (12-07-21 @ 12:55) (70 - 94)  BP: 117/70 (12-07-21 @ 12:55) (101/70 - 120/75)  RR: 18 (12-07-21 @ 12:55) (17 - 18)  SpO2: 97% (12-07-21 @ 12:55) (97% - 100%)  Wt(kg): --  I&O's Summary    06 Dec 2021 07:01  -  07 Dec 2021 07:00  --------------------------------------------------------  IN: 120 mL / OUT: 600 mL / NET: -480 mL    07 Dec 2021 07:01  -  07 Dec 2021 15:05  --------------------------------------------------------  IN: 240 mL / OUT: 0 mL / NET: 240 mL        Appearance: Normal	  HEENT:   Normal oral mucosa, EOMI	  Cardiovascular:  S1 S2, No JVD,    Respiratory: Lungs clear to auscultation	  Psychiatry: Alert  Gastrointestinal:  Soft, Non-tender, + BS	  Skin: No rashes   Neurologic: Non-focal  Extremities:  No edema  Vascular: Peripheral pulses palpable    	    	  	  CARDIAC MARKERS:  Labs personally reviewed by me                                  10.5   6.07  )-----------( 215      ( 07 Dec 2021 05:29 )             31.4     12-07    130<L>  |  98  |  85<H>  ----------------------------<  307<H>  4.3   |  17<L>  |  4.24<H>    Ca    8.5      07 Dec 2021 05:29  Phos  4.8     12-06  Mg     2.6     12-06    TPro  7.0  /  Alb  4.2  /  TBili  0.4  /  DBili  x   /  AST  14  /  ALT  17  /  AlkPhos  125<H>  12-06          EKG: Personally reviewed by me - 77 bpm SR w/PVC       Radiology: Personally reviewed by me -   < from: US Renal (12.07.21 @ 13:36) >  Echogenic renal cortex which may be secondary to medical kidney disease. No hydronephrosis.    Debris is noted within the urinary bladder..  Correlation with urinalysis is suggested.      < end of copied text >  < from: CT Head No Cont (12.06.21 @ 18:14) >    BRAIN CT : No intracranial hemorrhage. No major vessel distribution infarct.  CERVICAL SPINE CT: No acute fracture or traumatic subluxation  .  < from: Transthoracic Echocardiogram (12.07.21 @ 11:24) >  EF (Visual Estimate): 40-45 %    < end of copied text >  < from: Transthoracic Echocardiogram (12.07.21 @ 11:24) >  1. Normal mitral valve. Minimal mitral regurgitation.  2. Calcified trileaflet aortic valve with normal opening.  Minimal aortic regurgitation.  3. Mild segmental left ventricular systolic dysfunction.  The basal inferior, basal septum, distal septum, distal  inferior, and the apex are akinetic. Recommend limited  repeat imaging with intravenous echo contrast to better  visualize the apex. (Per sonographer, patient did not agree  to echo contrast during the study.)  4. Mild diastolic dysfunction (Stage I).  5. Normal right ventricular size and function.    < end of copied text >    < from: Transthoracic Echocardiogram (07.27.18 @ 07:05) >  . Mild segmental left ventricular systolic dysfunction.  Hypokinesis of apex, distal septum and distal inferior  wall. Global LV function is perserved.  2. Mild diastolic dysfunction (Stage I).  3. Normal right ventricular size and function.    < end of copied text >    Assessment /Plan:     Patient is a 61 year old female with a history of CAD S/P past PCI reports 4 stents last one 3-4 years ago, on dual antiplatelet therapy, ischemic cardiomyopathy with moderate LV dysfunction (46%) with s/p cardiomems, chronic kidney disease last Cr in 2018, essential HTN, history of depression, type 2 DM on insulin with apparent complicating retinopathy and neuropathy, history of gout, several year history of neck pain with past MRI with cervical stenosis, with the patient sent in from an outpatient office post vomiting with hyperglycemia. Pt reports being followed by Dr. Torres at [facility] with whom he had normal echo and stress test about 3 months ago. Pt denies smoking. He is able to up one flight of stairs at his own pace. He uses the walker for long distance and denies CP and SOB but reports occasional fatigue. Pt currently denies any CP/ NV/ SOB/ palpitations.     Problem/Plan - 1:  ·  Problem: : Elevated  Troponin likely in the setting of CKD  - EKG noted   - Trops have peaked at 120, latest one 104  - CKMB 4.2   - Pt denies any SOB/ CP   - TTE revealed -- Mild segmental left ventricular systolic dysfunction. The basal inferior, basal septum, distal septum, distal  inferior, and the apex are akinetic. Mild diastolic dysfunction.  - will conduct ischemic w/u when LEYDA improves     Problem/Plan - 2:  ·  Problem: CAD  hx of PCI   - c/w ASA and Brilinta   - c/w statin     Problem/Plan - 3:  ·  Problem:  Hypertension.   - BP stable  - c/w coreg q12h.  - ARB and torsemide on hold 2/2 LEYDA     Problem/Plan - 4:  ·  Problem: Type 2 diabetes mellitus with hyperglycemia.   -  ISS   - fs achs  - A1C 11.8   - Endo consult appreciated     Problem/Plan - 5:  ·  Problem:  Acute kidney injury superimposed on CKD.   - ARB and torsemide on hold   - renal US as noted above   - renal consult    Problem/Plan - 6:  ·  Problem: Hyperlipidemia.    - c/w atorvastatin daily.    Problem/Plan - 7:  ·  Problem: DVT ppx Heparin SQ         Sparkle Barr FNP-BC   Anthony Walters, DO [facility]  Cardiovascular Medicine  [address] [place] [address] Dr, [address]  Office [phone]  Cell [phone] Date of Service :   12-07-21 @ 15:05  CHIEF COMPLAINT:Patient is a 61y old  Male who presents with a chief complaint of Sent in from an outpatient office for apparent FS of 480 with episode of nausea. (07 Dec 2021 13:45)      HISTORY OF PRESENT ILLNESS:HPI:  60 y/o M with a history of CAD S/P past PCI on dual antiplatelet therapy, HFpEF with s/P Cardiomems, chronic kidney disease last Cr in 2018, essential HTN maintained on an ARB, Torsemide, Coreg, history of depression maintained on daily Cymbalta, type 2 DM on insulin with apparent complicating retinopathy and neuropathy, history of gout, several year history of neck pain with past MRI with cervical stenosis, with the patient sent in from an outpatient office (he does not know the physician who referred him to the ER) with patient reporting his FS of 480 this AM and he administered his short acting insulin, but with an episode of nausea with episodes of HA and LEFT sided neck cramping, worse with movement of the neck, improved with rest and recumbency.  No chest pain/pressure.  No palpitations.  Patient admits to poor exercise tolerance and self limits his activity.  No N/V at present and wishes a regular diet.  No focal weakness.  NO tearing neck pain.  NO back pain, no tearing back pain.      Patient reports receiving Pfizer COVID-19 vaccine x 2. (06 Dec 2021 16:32)      PAST MEDICAL & SURGICAL HISTORY:  Diabetes Mellitus Type II, Uncontrolled    Dyslipidemia    s/p Angioplasty with Stent    HTN (hypertension)    Gout    Diabetic retinopathy    GERD (gastroesophageal reflux disease)    Nephrolithiasis    Vitamin D deficiency    Hepatitis    CKD (chronic kidney disease)    Ischemic cardiomyopathy    ASHD (arteriosclerotic heart disease)    Pulmonary hypertension    Myocardial infarction    CAD (coronary artery disease)    BPH (benign prostatic hyperplasia)    Retinal Hemorrhage    S/P coronary artery stent placement  2010 TRICIA to Diag ; 11/18/2010  LAD; 2/4/11 ATOM liberte Diag; 5/15/2017  TRICIA to 1st diag; 6/7/17 PCI with laser and high pressure balloon    History of eye surgery  left eye    H/O lithotripsy    Diabetes with retinopathy  S/P PPV/PRP/GAS  OD 2/22/17 for viterous hemorrhage    CHF with cardiomyopathy  Insertion of Cardiomems monitor    Stented coronary artery            MEDICATIONS:  aspirin enteric coated 81 milliGRAM(s) Oral daily  carvedilol 25 milliGRAM(s) Oral every 12 hours  heparin   Injectable 5000 Unit(s) SubCutaneous every 8 hours  tamsulosin 0.4 milliGRAM(s) Oral at bedtime  ticagrelor 90 milliGRAM(s) Oral every 12 hours        acetaminophen     Tablet .. 650 milliGRAM(s) Oral every 6 hours PRN  DULoxetine 30 milliGRAM(s) Oral daily  gabapentin 300 milliGRAM(s) Oral two times a day    famotidine    Tablet 20 milliGRAM(s) Oral daily    allopurinol 300 milliGRAM(s) Oral daily  atorvastatin 80 milliGRAM(s) Oral at bedtime  dextrose 40% Gel 15 Gram(s) Oral once  dextrose 50% Injectable 25 Gram(s) IV Push once  dextrose 50% Injectable 12.5 Gram(s) IV Push once  dextrose 50% Injectable 25 Gram(s) IV Push once  glucagon  Injectable 1 milliGRAM(s) IntraMuscular once  insulin glargine Injectable (LANTUS) 25 Unit(s) SubCutaneous at bedtime  insulin lispro (ADMELOG) corrective regimen sliding scale   SubCutaneous three times a day before meals  insulin lispro (ADMELOG) corrective regimen sliding scale   SubCutaneous at bedtime  insulin lispro Injectable (ADMELOG) 8 Unit(s) SubCutaneous three times a day before meals    dextrose 5%. 1000 milliLiter(s) IV Continuous <Continuous>  dextrose 5%. 1000 milliLiter(s) IV Continuous <Continuous>  influenza   Vaccine 0.5 milliLiter(s) IntraMuscular once      FAMILY HISTORY:  Family history of cardiac disorder in father (Father)        Non-contributory    SOCIAL HISTORY:    [ ] not a smoker  Allergies    No Known Allergies    Intolerances    	    REVIEW OF SYSTEMS:  CONSTITUTIONAL: No fever  EYES: No eye pain, visual disturbances, or discharge  ENMT:  No difficulty hearing, tinnitus  NECK: No pain or stiffness  RESPIRATORY: No cough, wheezing,  CARDIOVASCULAR: No chest pain, palpitations, passing out, dizziness, or leg swelling  GASTROINTESTINAL:  No nausea, vomiting, diarrhea or constipation. No melena.  GENITOURINARY: No dysuria, hematuria  NEUROLOGICAL: No stroke like symptoms  SKIN: No burning or lesions   ENDOCRINE: No heat or cold intolerance  MUSCULOSKELETAL: No joint pain or swelling  PSYCHIATRIC: No  anxiety, mood swings  HEME/LYMPH: No bleeding gums  ALLERGY AND IMMUNOLOGIC: No hives or eczema	    All other ROS negative    PHYSICAL EXAM:  T(C): 36.5 (12-07-21 @ 12:55), Max: 36.7 (12-06-21 @ 19:20)  HR: 70 (12-07-21 @ 12:55) (70 - 94)  BP: 117/70 (12-07-21 @ 12:55) (101/70 - 120/75)  RR: 18 (12-07-21 @ 12:55) (17 - 18)  SpO2: 97% (12-07-21 @ 12:55) (97% - 100%)  Wt(kg): --  I&O's Summary    06 Dec 2021 07:01  -  07 Dec 2021 07:00  --------------------------------------------------------  IN: 120 mL / OUT: 600 mL / NET: -480 mL    07 Dec 2021 07:01  -  07 Dec 2021 15:05  --------------------------------------------------------  IN: 240 mL / OUT: 0 mL / NET: 240 mL        Appearance: Normal	  HEENT:   Normal oral mucosa, EOMI	  Cardiovascular:  S1 S2, No JVD,    Respiratory: Lungs clear to auscultation	  Psychiatry: Alert  Gastrointestinal:  Soft, Non-tender, + BS	  Skin: No rashes   Neurologic: Non-focal  Extremities:  No edema  Vascular: Peripheral pulses palpable    	    	  	  CARDIAC MARKERS:  Labs personally reviewed by me                                  10.5   6.07  )-----------( 215      ( 07 Dec 2021 05:29 )             31.4     12-07    130<L>  |  98  |  85<H>  ----------------------------<  307<H>  4.3   |  17<L>  |  4.24<H>    Ca    8.5      07 Dec 2021 05:29  Phos  4.8     12-06  Mg     2.6     12-06    TPro  7.0  /  Alb  4.2  /  TBili  0.4  /  DBili  x   /  AST  14  /  ALT  17  /  AlkPhos  125<H>  12-06          EKG: Personally reviewed by me - 77 bpm SR w/PVC       Radiology: Personally reviewed by me -   < from: US Renal (12.07.21 @ 13:36) >  Echogenic renal cortex which may be secondary to medical kidney disease. No hydronephrosis.    Debris is noted within the urinary bladder..  Correlation with urinalysis is suggested.      < end of copied text >  < from: CT Head No Cont (12.06.21 @ 18:14) >    BRAIN CT : No intracranial hemorrhage. No major vessel distribution infarct.  CERVICAL SPINE CT: No acute fracture or traumatic subluxation  .  < from: Transthoracic Echocardiogram (12.07.21 @ 11:24) >  EF (Visual Estimate): 40-45 %    < end of copied text >  < from: Transthoracic Echocardiogram (12.07.21 @ 11:24) >  1. Normal mitral valve. Minimal mitral regurgitation.  2. Calcified trileaflet aortic valve with normal opening.  Minimal aortic regurgitation.  3. Mild segmental left ventricular systolic dysfunction.  The basal inferior, basal septum, distal septum, distal  inferior, and the apex are akinetic. Recommend limited  repeat imaging with intravenous echo contrast to better  visualize the apex. (Per sonographer, patient did not agree  to echo contrast during the study.)  4. Mild diastolic dysfunction (Stage I).  5. Normal right ventricular size and function.    < end of copied text >    < from: Transthoracic Echocardiogram (07.27.18 @ 07:05) >  . Mild segmental left ventricular systolic dysfunction.  Hypokinesis of apex, distal septum and distal inferior  wall. Global LV function is perserved.  2. Mild diastolic dysfunction (Stage I).  3. Normal right ventricular size and function.    < end of copied text >    Assessment /Plan:     Patient is a 61 year old female with a history of CAD S/P past PCI reports 4 stents last one 3-4 years ago, on dual antiplatelet therapy, ischemic cardiomyopathy with moderate LV dysfunction (46%) with s/p cardiomems, most recent records at [facility] note HFpEF chronic kidney disease last Cr in 2018, essential HTN, history of depression, type 2 DM on insulin with apparent complicating retinopathy and neuropathy, history of gout, several year history of neck pain with past MRI with cervical stenosis, with the patient sent in from an outpatient office post vomiting with hyperglycemia. Pt reports being followed by Dr. Torres at [facility] with whom he had normal echo and stress test about 3 months ago. Pt denies smoking. He is able to up one flight of stairs at his own pace. He uses the walker for long distance and denies CP and SOB but reports occasional fatigue. Pt currently denies any CP/ NV/ SOB/ palpitations.     Problem/Plan - 1:  ·  Problem: : Elevated  Troponin likely in the setting of CKD, -trops have peaked at 120, latest one 104  - denies chest pain  - Prior hx of CAD s/p PCI to LAD and D1  - NM PET stress test at [facility] 0 7/2021 with mod-severe degree of myocardial scarring if anteroseptal and apical walls  - TTE 5/2021 at [facility] with EF 40-45%, hypokinesis of the apical septal, apical lateral, and apex  - TTE here revealed -- Mild segmental left ventricular systolic dysfunction. The basal inferior, basal septum, distal septum, distal inferior, and the apex are akinetic. Mild diastolic dysfunction.  - Prior imaging at [facility] overall consistent with imaging here, will hold off further ischemic eval at this time    Problem/Plan - 2:  ·  Problem: CAD  hx of PCI   - Prior hx of CAD s/p PCI to LAD and D1  - c/w ASA and Brilinta   - c/w statin     Problem/Plan - 3:  ·  Problem:  Hypertension.   - BP stable  - c/w coreg q12h.  - ARB and torsemide on hold 2/2 LEYDA     Problem/Plan - 4:  ·  Problem: Type 2 diabetes mellitus with hyperglycemia.   -  ISS   - fs achs  - A1C 11.8   - Endo consult appreciated     Problem/Plan - 5:  ·  Problem:  Acute kidney injury superimposed on CKD.   - ARB and torsemide on hold   - renal US as noted above   - renal consult    Problem/Plan - 6:  ·  Problem: Hyperlipidemia.    - c/w atorvastatin daily.    Problem/Plan - 7:  ·  Problem: DVT ppx Heparin SQ       Advanced care planning/advanced directives discussed with patient/family. DNR status including forceful chest compressions to attempt to restart the heart, ventilator support/artificial breathing, electric shock, artificial nutrition, health care proxy, Molst form all discussed with pt. Pt wishes to consider.  More than fifteen minutes spent on discussing advanced directives. OMT on six regions for acute somatic dysfunctions done at the bedside       Sparkle Barr [facility]   Anthony Walters DO [facility]  Cardiovascular Medicine  [address] [place] [address], [address]  Office [phone]  Cell [phone]

## 2021-12-07 NOTE — CONSULT NOTE ADULT - REASON FOR ADMISSION
Sent in from an outpatient office for apparent FS of 480 with episode of nausea.

## 2021-12-07 NOTE — CONSULT NOTE ADULT - PROBLEM SELECTOR RECOMMENDATION 9
DM care complicated by severe depression, lack of motivations, poor dietary habits as well as poor endocrine follow up.  Patient states that he is adherent with his DM regimen but glucose persistently elevated likely due to high Carb meals despite extensive counseling in the past.   Discussed with patient the importance of Carb consistent diet and exercise as tolerated.    Recommend nutritional consultation  Recommend DM education through RN staff (see physical to RN order)  Discussed glycemic goal of a1c <7% to prevent microvascular complications of diabetes mellitus and reduce the risk of macrovascular complications.   Recommend annual podiatry and ophthalmology follow up.   Glucose goal of 80-180mg/dl while in patient.   Recommend Lantus 45at bedtime  Recommend Admelog___ tid with meals  Recommend LDSS (1:50 above 150mg/dl) and night time low does sliding scale for hyperglycemia corrections    Discharge recommendation/considerations: DM care complicated by severe depression, lack of motivations, poor dietary habits as well as poor endocrine follow up.  Patient states that he is adherent with his DM regimen but glucose persistently elevated likely due to high Carb meals despite extensive counseling in the past.   Discussed with patient the importance of Carb consistent diet and exercise as tolerated.    Recommend nutritional consultation  Recommend DM education through RN staff (see physical to RN order)  Discussed glycemic goal of a1c <7% to prevent microvascular complications of diabetes mellitus and reduce the risk of macrovascular complications.   Recommend annual podiatry and ophthalmology follow up.   Glucose goal of 80-180mg/dl while in patient.   Recommend to increase Lantus to 40 units bedtime  Recommend Admelog 10 units tid with meals  Recommend LDSS (1:50 above 150mg/dl) and night time low does sliding scale for hyperglycemia corrections    Discharge recommendation/considerations:  Patient to be discharged home on basal bolus regimen.   I'll try arrange patient to be seen sooner after discharge.

## 2021-12-07 NOTE — CHART NOTE - NSCHARTNOTEFT_GEN_A_CORE
Called by team asking about reducing admelog dose. Per team, pt only received 6 units total for lunch today for FS of 162 although he was ordered for 8 units. Primary team decided to reduce dose given glucose trend. Dinner .     Recommend to reduce admelog dose to 6 units TID pre-meal for now.    DW Team    Dolly Reyes DO, Endocrine Fellow   Pager [phone] from 9-5PM. After hours and on weekends please call [phone].

## 2021-12-07 NOTE — CONSULT NOTE ADULT - SUBJECTIVE AND OBJECTIVE BOX
HPI:  NIGHT HOSPITALIST:   Patient UNKNOWN to me previously, assigned to me at this point via the ER to admit this 60 y/o M--followed by his physicians above but apparently with poor compliance with followup--last discharged from [facility] in July 2018--patient with a history of CAD S/P past PCI on dual antiplatelet therapy, reported ischaemic cardiomyopathy with moderate LV dysfunction (46%) with s/P Cardiomems, chronic kidney disease last Cr in 2018, essential HTN maintained on an ARB, Torsemide, Coreg, history of depression maintained on daily Cymbalta, type 2 DM on insulin with apparent complicating retinopathy and neuropathy, history of gout, several year history of neck pain with past MRI with cervical stenosis, with the patient sent in from an outpatient office (he does not know the physician who referred him to the ER) with patient reporting his FS of 480 this AM and he administered his short acting insulin, but with an episode of nausea with episodes of HA and LEFT sided neck cramping, worse with movement of the neck, improved with rest and recumbency.  No chest pain/pressure.  No palpitations.  Patient admits to poor exercise tolerance and self limits his activity.  No N/V at present and wishes a regular diet.  No focal weakness.  NO tearing neck pain.  NO back pain, no tearing back pain.      Patient reports receiving Pfizer COVID-19 vaccine x 2. (06 Dec 2021 16:32)      PAST MEDICAL & SURGICAL HISTORY:  Diabetes Mellitus Type II, Uncontrolled    Dyslipidemia    s/p Angioplasty with Stent    HTN (hypertension)    Gout    Diabetic retinopathy    GERD (gastroesophageal reflux disease)    Nephrolithiasis    Vitamin D deficiency    Hepatitis    CKD (chronic kidney disease)    Ischemic cardiomyopathy    ASHD (arteriosclerotic heart disease)    Pulmonary hypertension    Myocardial infarction    CAD (coronary artery disease)    BPH (benign prostatic hyperplasia)    Retinal Hemorrhage    S/P coronary artery stent placement  2010 TRICIA to Diag ; 11/18/2010  LAD; 2/4/11 ATOM liberte Diag; 5/15/2017  TRICIA to 1st diag; 6/7/17 PCI with laser and high pressure balloon    History of eye surgery  left eye    H/O lithotripsy    Diabetes with retinopathy  S/P PPV/PRP/GAS  OD 2/22/17 for viterous hemorrhage    CHF with cardiomyopathy  Insertion of Cardiomems monitor    Stented coronary artery        FAMILY HISTORY:  Family history of cardiac disorder in father (Father)    Social History:  No smoking  No alcohol   No drugs    Home Medications:  allopurinol 300 mg oral tablet: 1 tab(s) orally once a day (06 Dec 2021 17:20)  aspirin 81 mg oral delayed release tablet: 1 tab(s) orally once a day (06 Dec 2021 17:20)  atorvastatin 80 mg oral tablet: 1 tab(s) orally once a day (at bedtime) (06 Dec 2021 17:20)  Brilinta (ticagrelor) 90 mg oral tablet: 1 tab(s) orally 2 times a day (06 Dec 2021 17:20)  Coreg 25 mg oral tablet: 1 tab(s) orally 2 times a day (06 Dec 2021 17:20)  Cymbalta 30 mg oral delayed release capsule: 1 cap(s) orally once a day (06 Dec 2021 17:20)  gabapentin 300 mg oral capsule: 1 cap(s) orally 3 times a day (06 Dec 2021 17:20)  HumaLOG 100 units/mL subcutaneous solution: 10 unit(s) subcutaneous 3 times a day (before meals) (06 Dec 2021 17:20)  Levemir 100 units/mL subcutaneous solution: 50 unit(s) subcutaneous once a day (at bedtime) (06 Dec 2021 17:20)  losartan 25 mg oral tablet: 1 tab(s) orally once a day (06 Dec 2021 17:20)  pantoprazole 40 mg oral delayed release tablet: 1 tab(s) orally once a day (before a meal) (06 Dec 2021 17:20)  tamsulosin 0.4 mg oral capsule: 1 cap(s) orally once a day (at bedtime) (06 Dec 2021 17:20)  torsemide 20 mg oral tablet: 2 tab(s) orally 2 times a day (06 Dec 2021 17:20)      MEDICATIONS  (STANDING):  allopurinol 300 milliGRAM(s) Oral daily  aspirin enteric coated 81 milliGRAM(s) Oral daily  atorvastatin 80 milliGRAM(s) Oral at bedtime  carvedilol 25 milliGRAM(s) Oral every 12 hours  dextrose 40% Gel 15 Gram(s) Oral once  dextrose 5%. 1000 milliLiter(s) (50 mL/Hr) IV Continuous <Continuous>  dextrose 5%. 1000 milliLiter(s) (100 mL/Hr) IV Continuous <Continuous>  dextrose 50% Injectable 25 Gram(s) IV Push once  dextrose 50% Injectable 12.5 Gram(s) IV Push once  dextrose 50% Injectable 25 Gram(s) IV Push once  DULoxetine 30 milliGRAM(s) Oral daily  famotidine    Tablet 20 milliGRAM(s) Oral daily  glucagon  Injectable 1 milliGRAM(s) IntraMuscular once  heparin   Injectable 5000 Unit(s) SubCutaneous every 8 hours  influenza   Vaccine 0.5 milliLiter(s) IntraMuscular once  insulin glargine Injectable (LANTUS) 25 Unit(s) SubCutaneous at bedtime  insulin lispro (ADMELOG) corrective regimen sliding scale   SubCutaneous three times a day before meals  insulin lispro (ADMELOG) corrective regimen sliding scale   SubCutaneous at bedtime  insulin lispro Injectable (ADMELOG) 8 Unit(s) SubCutaneous three times a day before meals  tamsulosin 0.4 milliGRAM(s) Oral at bedtime  ticagrelor 90 milliGRAM(s) Oral every 12 hours    MEDICATIONS  (PRN):  acetaminophen     Tablet .. 650 milliGRAM(s) Oral every 6 hours PRN Temp greater or equal to 38C (100.4F), Mild Pain (1 - 3)      Allergies    No Known Allergies    Intolerances      Review of Systems:  Constitutional: No fever  Eyes: No blurry vision  Neuro: No tremors  HEENT: No pain  Cardiovascular: No chest pain, palpitations  Respiratory: No SOB, no cough  GI: No nausea, vomiting, abdominal pain  : No dysuria  Skin: no rash  Psych: no depression  Endocrine: no polyuria, polydipsia  Hem/lymph: no swelling  Osteoporosis: no fractures    ALL OTHER SYSTEMS REVIEWED AND NEGATIVE    UNABLE TO OBTAIN    PHYSICAL EXAM:  -----------------------------  VITALS: T(C): 36.4 (12-07-21 @ 04:10)  T(F): 97.5 (12-07-21 @ 04:10), Max: 98.2 (12-06-21 @ 13:08)  HR: 73 (12-07-21 @ 04:10) (73 - 94)  BP: 110/68 (12-07-21 @ 04:10) (101/70 - 130/81)  RR:  (16 - 18)  SpO2:  (95% - 100%)  Wt(kg): --  GENERAL: NAD, well-groomed, well-developed  EYES: No proptosis, no lid lag, anicteric  HEENT:  Atraumatic, Normocephalic, moist mucous membranes  THYROID: Normal size, no palpable nodules  RESPIRATORY: Clear to auscultation bilaterally; No rales, rhonchi, wheezing, or rubs  CARDIOVASCULAR: Regular rate and rhythm; No murmurs; no peripheral edema  GI: Soft, nontender, non distended, normal bowel sounds  SKIN: Dry, intact, No rashes or lesions  MUSCULOSKELETAL: Full range of motion, normal strength  NEURO: sensation intact, extraocular movements intact, no tremor, normal reflexes  PSYCH: Alert and oriented x 3, normal affect, normal mood  CUSHING'S SIGNS: no striae    POCT Blood Glucose.: 348 mg/dL (12-07-21 @ 08:23)  POCT Blood Glucose.: 309 mg/dL (12-06-21 @ 21:33)  POCT Blood Glucose.: 210 mg/dL (12-06-21 @ 17:11)  POCT Blood Glucose.: 234 mg/dL (12-06-21 @ 13:19)                            10.5   6.07  )-----------( 215      ( 07 Dec 2021 05:29 )             31.4       12-07    130<L>  |  98  |  85<H>  ----------------------------<  307<H>  4.3   |  17<L>  |  4.24<H>    EGFR if : 16<L>  EGFR if non : 14<L>    Ca    8.5      12-07  Mg     2.6     12-06  Phos  4.8     12-06    TPro  7.0  /  Alb  4.2  /  TBili  0.4  /  DBili  x   /  AST  14  /  ALT  17  /  AlkPhos  125<H>  12-06      Thyroid Function Tests:      A1C with Estimated Average Glucose Result: 11.8 % (12-07-21 @ 00:41)    Radiology:   ------------------------               HPI:  NIGHT HOSPITALIST:   Patient UNKNOWN to me previously, assigned to me at this point via the ER to admit this 60 y/o M--followed by his physicians above but apparently with poor compliance with followup--last discharged from [facility] in July 2018--patient with a history of CAD S/P past PCI on dual antiplatelet therapy, reported ischaemic cardiomyopathy with moderate LV dysfunction (46%) with s/P Cardiomems, chronic kidney disease last Cr in 2018, essential HTN maintained on an ARB, Torsemide, Coreg, history of depression maintained on daily Cymbalta, type 2 DM on insulin with apparent complicating retinopathy and neuropathy, history of gout, several year history of neck pain with past MRI with cervical stenosis, with the patient sent in from an outpatient office (he does not know the physician who referred him to the ER) with patient reporting his FS of 480 this AM and he administered his short acting insulin, but with an episode of nausea with episodes of HA and LEFT sided neck cramping, worse with movement of the neck, improved with rest and recumbency.  No chest pain/pressure.  No palpitations.  Patient admits to poor exercise tolerance and self limits his activity.  No N/V at present and wishes a regular diet.  No focal weakness.  NO tearing neck pain.  NO back pain, no tearing back pain.      Patient reports receiving Pfizer COVID-19 vaccine x 2. (06 Dec 2021 16:32)    Endocrine history  Patient is well known to me.  He follows with me outpatient but hasn't been seen since 2019. Patient diagnosed with Type 2 DM since 1990s.  Complicated by CKD and now planning for potential dialysis, CAD s/p PCI.  Also with history of neuropathy, retinopathy. Care is managed by depression.   Patient had been laid off for 2 years.   Since then, per wife, has gotten very depressed and sleeping and watching TV most days.  He is on insulin therapy.  He states that insulin is very expensive.  Wife states that he always takes his inulin regimen.  However, he does not follow with DM diet.    He eats rice everyday.  His wife tries to make him healthier food such as vegetables and lean chicken but he does not want to eat it.   He states Levemir/Tojueo 50 units at bedtime.  He takes Humalog 15 units tid with meals.   He checks his glucose with Freestyle Homer sensor and they are usually high.   My office has reached out multiple times to him to schedule follow up appointment with me or CDE but he does not  his phone.  Wife endorsed that patient does not usually  his phone as well.   Regarding HTN, on multiple agents, management as per his nephrologist.  He is seeing Rocio Plata MD from [facility].   Regarding HLD, patient had history of high triglyceride level in the past. He is currently on statin therapy.     PAST MEDICAL & SURGICAL HISTORY:  Diabetes Mellitus Type II, Uncontrolled  Dyslipidemia  s/p Angioplasty with Stent  HTN (hypertension)  Gout  Diabetic retinopathy  GERD (gastroesophageal reflux disease)  Nephrolithiasis  Vitamin D deficiency  Hepatitis  CKD (chronic kidney disease)  Ischemic cardiomyopathy  ASHD (arteriosclerotic heart disease)  Pulmonary hypertension  Myocardial infarction  CAD (coronary artery disease)  BPH (benign prostatic hyperplasia)  Retinal Hemorrhage  S/P coronary artery stent placement  2010 TRICIA to Diag ; 11/18/2010  LAD; 2/4/11 ATOM liberte Diag; 5/15/2017  TRICIA to 1st diag; 6/7/17 PCI with laser and high pressure balloon  History of eye surgery  left eye  H/O lithotripsy  Diabetes with retinopathy  S/P PPV/PRP/GAS  OD 2/22/17 for viterous hemorrhage  CHF with cardiomyopathy  Insertion of Cardiomems monitor  Stented coronary artery    FAMILY HISTORY:  Family history of cardiac disorder in father (Father)    Social History:  No smoking  No alcohol   No drugs    Home Medications:  allopurinol 300 mg oral tablet: 1 tab(s) orally once a day (06 Dec 2021 17:20)  aspirin 81 mg oral delayed release tablet: 1 tab(s) orally once a day (06 Dec 2021 17:20)  atorvastatin 80 mg oral tablet: 1 tab(s) orally once a day (at bedtime) (06 Dec 2021 17:20)  Brilinta (ticagrelor) 90 mg oral tablet: 1 tab(s) orally 2 times a day (06 Dec 2021 17:20)  Coreg 25 mg oral tablet: 1 tab(s) orally 2 times a day (06 Dec 2021 17:20)  Cymbalta 30 mg oral delayed release capsule: 1 cap(s) orally once a day (06 Dec 2021 17:20)  gabapentin 300 mg oral capsule: 1 cap(s) orally 3 times a day (06 Dec 2021 17:20)  HumaLOG 100 units/mL subcutaneous solution: 10 unit(s) subcutaneous 3 times a day (before meals) (06 Dec 2021 17:20)  Levemir 100 units/mL subcutaneous solution: 50 unit(s) subcutaneous once a day (at bedtime) (06 Dec 2021 17:20)  losartan 25 mg oral tablet: 1 tab(s) orally once a day (06 Dec 2021 17:20)  pantoprazole 40 mg oral delayed release tablet: 1 tab(s) orally once a day (before a meal) (06 Dec 2021 17:20)  tamsulosin 0.4 mg oral capsule: 1 cap(s) orally once a day (at bedtime) (06 Dec 2021 17:20)  torsemide 20 mg oral tablet: 2 tab(s) orally 2 times a day (06 Dec 2021 17:20)      MEDICATIONS  (STANDING):  allopurinol 300 milliGRAM(s) Oral daily  aspirin enteric coated 81 milliGRAM(s) Oral daily  atorvastatin 80 milliGRAM(s) Oral at bedtime  carvedilol 25 milliGRAM(s) Oral every 12 hours  dextrose 40% Gel 15 Gram(s) Oral once  dextrose 5%. 1000 milliLiter(s) (50 mL/Hr) IV Continuous <Continuous>  dextrose 5%. 1000 milliLiter(s) (100 mL/Hr) IV Continuous <Continuous>  dextrose 50% Injectable 25 Gram(s) IV Push once  dextrose 50% Injectable 12.5 Gram(s) IV Push once  dextrose 50% Injectable 25 Gram(s) IV Push once  DULoxetine 30 milliGRAM(s) Oral daily  famotidine    Tablet 20 milliGRAM(s) Oral daily  glucagon  Injectable 1 milliGRAM(s) IntraMuscular once  heparin   Injectable 5000 Unit(s) SubCutaneous every 8 hours  influenza   Vaccine 0.5 milliLiter(s) IntraMuscular once  insulin glargine Injectable (LANTUS) 25 Unit(s) SubCutaneous at bedtime  insulin lispro (ADMELOG) corrective regimen sliding scale   SubCutaneous three times a day before meals  insulin lispro (ADMELOG) corrective regimen sliding scale   SubCutaneous at bedtime  insulin lispro Injectable (ADMELOG) 8 Unit(s) SubCutaneous three times a day before meals  tamsulosin 0.4 milliGRAM(s) Oral at bedtime  ticagrelor 90 milliGRAM(s) Oral every 12 hours    MEDICATIONS  (PRN):  acetaminophen     Tablet .. 650 milliGRAM(s) Oral every 6 hours PRN Temp greater or equal to 38C (100.4F), Mild Pain (1 - 3)      Allergies    No Known Allergies    Intolerances      Review of Systems:  Constitutional: No fever  Eyes: No blurry vision  Neuro: No tremors  HEENT: No pain  Cardiovascular: No chest pain, palpitations  Respiratory: No SOB, no cough  GI: No nausea, vomiting, abdominal pain  : No dysuria  Skin: no rash  Psych: no depression  Endocrine: no polyuria, polydipsia  Hem/lymph: no swelling  Osteoporosis: no fractures    ALL OTHER SYSTEMS REVIEWED AND NEGATIVE  PHYSICAL EXAM:  -----------------------------  VITALS: T(C): 36.4 (12-07-21 @ 04:10)  T(F): 97.5 (12-07-21 @ 04:10), Max: 98.2 (12-06-21 @ 13:08)  HR: 73 (12-07-21 @ 04:10) (73 - 94)  BP: 110/68 (12-07-21 @ 04:10) (101/70 - 130/81)  RR:  (16 - 18)  SpO2:  (95% - 100%)  Wt(kg): --  GENERAL: NAD, well-groomed, well-developed  EYES: No proptosis, no lid lag, anicteric  HEENT:  Atraumatic, Normocephalic, moist mucous membranes  THYROID: Normal size, no palpable nodules  RESPIRATORY: Clear to auscultation bilaterally; No rales, rhonchi, wheezing, or rubs  CARDIOVASCULAR: Regular rate and rhythm; No murmurs; no peripheral edema  GI: Soft, nontender, non distended, normal bowel sounds  SKIN: Dry, intact, No rashes or lesions  MUSCULOSKELETAL: Full range of motion, normal strength  NEURO: sensation intact, extraocular movements intact, no tremor, normal reflexes  PSYCH: Alert and oriented x 3, normal affect, normal mood  CUSHING'S SIGNS: no striae    POCT Blood Glucose.: 348 mg/dL (12-07-21 @ 08:23)  POCT Blood Glucose.: 309 mg/dL (12-06-21 @ 21:33)  POCT Blood Glucose.: 210 mg/dL (12-06-21 @ 17:11)  POCT Blood Glucose.: 234 mg/dL (12-06-21 @ 13:19)                            10.5   6.07  )-----------( 215      ( 07 Dec 2021 05:29 )             31.4       12-07    130<L>  |  98  |  85<H>  ----------------------------<  307<H>  4.3   |  17<L>  |  4.24<H>    EGFR if : 16<L>  EGFR if non : 14<L>    Ca    8.5      12-07  Mg     2.6     12-06  Phos  4.8     12-06    TPro  7.0  /  Alb  4.2  /  TBili  0.4  /  DBili  x   /  AST  14  /  ALT  17  /  AlkPhos  125<H>  12-06      Thyroid Function Tests:      A1C with Estimated Average Glucose Result: 11.8 % (12-07-21 @ 00:41)    Radiology:   ------------------------

## 2021-12-07 NOTE — CONSULT NOTE ADULT - ASSESSMENT
ADAM KOWALSKI is a 61y old man with poorly controlled Type 2 DM, A1C 11.8% on admission, follows up with myself (Dr. Hernandez) outpatient, complicated by CKD, CAD, retinopathy, neuropathy, here for hyperglycemia.      ADAM KOWALSKI is a 61y old man with poorly controlled Type 2 DM, A1C 11.8% on admission, follows up with myself (Dr. Hernandez) outpatient, complicated by CKD, CAD, retinopathy, neuropathy, here for hyperglycemia.     Patient is high risk with high level decision making due to uncontrolled diabetes which places patient at high risk for cardiovascular and cerebrovascular events. Patient with lability of glucose requiring close monitoring and insulin adjustments.

## 2021-12-07 NOTE — CONSULT NOTE ADULT - PROBLEM SELECTOR RECOMMENDATION 3
Check fasting lipid profile  Continue with statin therapy.   Patient had a history of high triglyceride level in the past.

## 2021-12-08 LAB
ANION GAP SERPL CALC-SCNC: 14 MMOL/L — SIGNIFICANT CHANGE UP (ref 5–17)
BUN SERPL-MCNC: 86 MG/DL — HIGH (ref 7–23)
CALCIUM SERPL-MCNC: 8.5 MG/DL — SIGNIFICANT CHANGE UP (ref 8.4–10.5)
CHLORIDE SERPL-SCNC: 103 MMOL/L — SIGNIFICANT CHANGE UP (ref 96–108)
CO2 SERPL-SCNC: 17 MMOL/L — LOW (ref 22–31)
CREAT SERPL-MCNC: 4.32 MG/DL — HIGH (ref 0.5–1.3)
GLUCOSE BLDC GLUCOMTR-MCNC: 141 MG/DL — HIGH (ref 70–99)
GLUCOSE BLDC GLUCOMTR-MCNC: 208 MG/DL — HIGH (ref 70–99)
GLUCOSE BLDC GLUCOMTR-MCNC: 227 MG/DL — HIGH (ref 70–99)
GLUCOSE BLDC GLUCOMTR-MCNC: 231 MG/DL — HIGH (ref 70–99)
GLUCOSE BLDC GLUCOMTR-MCNC: 264 MG/DL — HIGH (ref 70–99)
GLUCOSE SERPL-MCNC: 215 MG/DL — HIGH (ref 70–99)
LACTATE SERPL-SCNC: 1.1 MMOL/L — SIGNIFICANT CHANGE UP (ref 0.7–2)
POTASSIUM SERPL-MCNC: 4.5 MMOL/L — SIGNIFICANT CHANGE UP (ref 3.5–5.3)
POTASSIUM SERPL-SCNC: 4.5 MMOL/L — SIGNIFICANT CHANGE UP (ref 3.5–5.3)
SODIUM SERPL-SCNC: 134 MMOL/L — LOW (ref 135–145)

## 2021-12-08 PROCEDURE — 99233 SBSQ HOSP IP/OBS HIGH 50: CPT

## 2021-12-08 PROCEDURE — 99232 SBSQ HOSP IP/OBS MODERATE 35: CPT

## 2021-12-08 RX ORDER — BNT162B2 0.23 MG/2.25ML
0.3 INJECTION, SUSPENSION INTRAMUSCULAR ONCE
Refills: 0 | Status: DISCONTINUED | OUTPATIENT
Start: 2021-12-08 | End: 2021-12-08

## 2021-12-08 RX ORDER — BNT162B2 0.23 MG/2.25ML
0.3 INJECTION, SUSPENSION INTRAMUSCULAR ONCE
Refills: 0 | Status: COMPLETED | OUTPATIENT
Start: 2021-12-09 | End: 2021-12-09

## 2021-12-08 RX ORDER — BENZOCAINE AND MENTHOL 5; 1 G/100ML; G/100ML
1 LIQUID ORAL ONCE
Refills: 0 | Status: COMPLETED | OUTPATIENT
Start: 2021-12-08 | End: 2021-12-08

## 2021-12-08 RX ADMIN — Medication 300 MILLIGRAM(S): at 09:21

## 2021-12-08 RX ADMIN — HEPARIN SODIUM 5000 UNIT(S): 5000 INJECTION INTRAVENOUS; SUBCUTANEOUS at 21:44

## 2021-12-08 RX ADMIN — TAMSULOSIN HYDROCHLORIDE 0.4 MILLIGRAM(S): 0.4 CAPSULE ORAL at 21:43

## 2021-12-08 RX ADMIN — TICAGRELOR 90 MILLIGRAM(S): 90 TABLET ORAL at 18:05

## 2021-12-08 RX ADMIN — GABAPENTIN 300 MILLIGRAM(S): 400 CAPSULE ORAL at 18:05

## 2021-12-08 RX ADMIN — Medication 6 UNIT(S): at 13:10

## 2021-12-08 RX ADMIN — Medication 2: at 13:10

## 2021-12-08 RX ADMIN — Medication 2: at 09:21

## 2021-12-08 RX ADMIN — TICAGRELOR 90 MILLIGRAM(S): 90 TABLET ORAL at 05:39

## 2021-12-08 RX ADMIN — Medication 81 MILLIGRAM(S): at 09:22

## 2021-12-08 RX ADMIN — HEPARIN SODIUM 5000 UNIT(S): 5000 INJECTION INTRAVENOUS; SUBCUTANEOUS at 13:10

## 2021-12-08 RX ADMIN — ATORVASTATIN CALCIUM 80 MILLIGRAM(S): 80 TABLET, FILM COATED ORAL at 21:43

## 2021-12-08 RX ADMIN — CARVEDILOL PHOSPHATE 25 MILLIGRAM(S): 80 CAPSULE, EXTENDED RELEASE ORAL at 18:05

## 2021-12-08 RX ADMIN — BENZOCAINE AND MENTHOL 1 LOZENGE: 5; 1 LIQUID ORAL at 04:15

## 2021-12-08 RX ADMIN — GABAPENTIN 300 MILLIGRAM(S): 400 CAPSULE ORAL at 05:39

## 2021-12-08 RX ADMIN — Medication 1: at 21:42

## 2021-12-08 RX ADMIN — Medication 6 UNIT(S): at 18:04

## 2021-12-08 RX ADMIN — HEPARIN SODIUM 5000 UNIT(S): 5000 INJECTION INTRAVENOUS; SUBCUTANEOUS at 05:46

## 2021-12-08 RX ADMIN — FAMOTIDINE 20 MILLIGRAM(S): 10 INJECTION INTRAVENOUS at 09:22

## 2021-12-08 RX ADMIN — DULOXETINE HYDROCHLORIDE 30 MILLIGRAM(S): 30 CAPSULE, DELAYED RELEASE ORAL at 09:22

## 2021-12-08 RX ADMIN — INSULIN GLARGINE 40 UNIT(S): 100 INJECTION, SOLUTION SUBCUTANEOUS at 21:42

## 2021-12-08 RX ADMIN — CARVEDILOL PHOSPHATE 25 MILLIGRAM(S): 80 CAPSULE, EXTENDED RELEASE ORAL at 05:39

## 2021-12-08 RX ADMIN — Medication 6 UNIT(S): at 09:21

## 2021-12-08 NOTE — DIETITIAN INITIAL EVALUATION ADULT. - OTHER INFO
Denies nausea/vomit :  Denies difficulty chewing /swallow :  Denies diarrhea/constipation:  Last BM : yesterday  NKFA  Ht: 67"  Ht taken from pt  Dosing ht: 170.2cm  Dosing wt: 83.1kg  Ht used for BMI 67"  Wt used for .2pounds  Education Provided : pt was educated on the importance of supplements to increase calorie and protein intake in light of RD's nutritional findings.   pressure injury:

## 2021-12-08 NOTE — PROGRESS NOTE ADULT - PROBLEM SELECTOR PROBLEM 1
Acute kidney injury superimposed on CKD
Type 2 diabetes mellitus with hyperglycemia
Acute kidney injury superimposed on CKD

## 2021-12-08 NOTE — PROGRESS NOTE ADULT - PROBLEM SELECTOR PLAN 3
- likely in the setting of CKD  - no chest pain, history of CAD s/p stenting-- c/w aspirin and brillinta  - trops have peaked, will no longer ben  - TTE-- Mild segmental left ventricular systolic dysfunction. The basal inferior, basal septum, distal septum, distal  inferior, and the apex are akinetic. Mild diastolic dysfunction.  - cards consult placed- dr morrell
- likely in the setting of CKD  - no chest pain, history of CAD s/p stenting-- c/w aspirin and brillinta  - trops have peaked, will no longer ben  - TTE-- Mild segmental left ventricular systolic dysfunction. The basal inferior, basal septum, distal septum, distal  inferior, and the apex are akinetic. Mild diastolic dysfunction.  - cards consult placed- dr morrell

## 2021-12-08 NOTE — DIETITIAN INITIAL EVALUATION ADULT. - PERTINENT LABORATORY DATA
12-08 @ 07:12: Na 134<L>, BUN 86<H>, Cr 4.32<H>, <H>, K+ 4.5, Phos --, Mg --, Alk Phos --, ALT/SGPT --, AST/SGOT --, HbA1c --

## 2021-12-08 NOTE — PROGRESS NOTE ADULT - NSPROGADDITIONALINFOA_GEN_ALL_CORE
disposition: dc 12/9 pending final insulin regimen  discussed with patients wife at bedside 12/7 and 12/8    Krysten Bautista D.O.  Hospitalist- available on MS teams
disposition: renal failure w/u, cards consult pending, endo consult pending  discussed with patients wife at bedside 12/7    Krysten Bautista D.O.  Hospitalist- available on MS teams

## 2021-12-08 NOTE — PROGRESS NOTE ADULT - PROBLEM SELECTOR PLAN 2
- DM2 with neuropathy and nephropathy  - fs controlled  - fs achs  - c/w lantus 40 units at bedtime with novolog 6 units pre meals for now  - c/w gabapentin BID  - endo following and still adjusting insulins
- DM2 with neuropathy and nephropathy  - fs uncontrolled  - fs achs  - c/w lantus 25 units at bedtime with novolog 8 units pre meals for now, endo recs pending  - c/w gabapentin TID

## 2021-12-08 NOTE — PROVIDER CONTACT NOTE (OTHER) - BACKGROUND
Pt admitted for hyperglycemia, N/V; CKD. PMH of CAD, BPH, GERD, HTN, DM type 2; uncontrolled, neck pain.

## 2021-12-08 NOTE — DIETITIAN INITIAL EVALUATION ADULT. - PROBLEM SELECTOR PLAN 7
Transitions of Care Status:  1.  Name of PCP:     Jeanne Barragan MD (PCP) [phone]  2.  PCP Contacted on Admission: [ ] Y    [x ] N    3.  PCP contacted at Discharge: [ ] Y    [ ] N    [ ] N/A  4.  Post-Discharge Appointment Date and Location:  5.  Summary of Handoff given to PCP:

## 2021-12-08 NOTE — DIETITIAN INITIAL EVALUATION ADULT. - DIET TYPE
DASH/TLC (sodium and cholesterol restricted diet)/no concentrated phosphorus/consistent carbohydrate (evening snack)

## 2021-12-08 NOTE — DIETITIAN INITIAL EVALUATION ADULT. - PROBLEM SELECTOR PLAN 5
See above.   ARB temporarily HELD for now.  Would review with renal dosing for torsemide in the AM with CKD5.   Patient's nephrologist, contacted by examiner for consult for the AM.

## 2021-12-08 NOTE — PROGRESS NOTE ADULT - PROBLEM SELECTOR PLAN 1
- baseline cr 2.2, cr today 4.32-- likely progression of diabetic nephropathy-- no acute dialytic need  - HOLD ARB and torsemide  - renal US--Echogenic renal cortex which may be secondary to medical kidney disease. No hydronephrosis.  - renal Dr Montaño following--- okay for discharge
- baseline cr 2.2, cr today 4.24-- likely progression of diabetic nephropathy  - HOLD ARB and torsemide  - renal US pending  - repeat bmp daily   - renal consult pending Dr Montaño

## 2021-12-08 NOTE — DIETITIAN INITIAL EVALUATION ADULT. - PERTINENT MEDS FT
MEDICATIONS  (STANDING):  allopurinol 300 milliGRAM(s) Oral daily  aspirin enteric coated 81 milliGRAM(s) Oral daily  atorvastatin 80 milliGRAM(s) Oral at bedtime  carvedilol 25 milliGRAM(s) Oral every 12 hours  dextrose 40% Gel 15 Gram(s) Oral once  dextrose 5%. 1000 milliLiter(s) (50 mL/Hr) IV Continuous <Continuous>  dextrose 5%. 1000 milliLiter(s) (100 mL/Hr) IV Continuous <Continuous>  dextrose 50% Injectable 25 Gram(s) IV Push once  dextrose 50% Injectable 12.5 Gram(s) IV Push once  dextrose 50% Injectable 25 Gram(s) IV Push once  DULoxetine 30 milliGRAM(s) Oral daily  famotidine    Tablet 20 milliGRAM(s) Oral daily  gabapentin 300 milliGRAM(s) Oral two times a day  glucagon  Injectable 1 milliGRAM(s) IntraMuscular once  heparin   Injectable 5000 Unit(s) SubCutaneous every 8 hours  influenza   Vaccine 0.5 milliLiter(s) IntraMuscular once  insulin glargine Injectable (LANTUS) 40 Unit(s) SubCutaneous at bedtime  insulin lispro (ADMELOG) corrective regimen sliding scale   SubCutaneous three times a day before meals  insulin lispro (ADMELOG) corrective regimen sliding scale   SubCutaneous at bedtime  insulin lispro Injectable (ADMELOG) 6 Unit(s) SubCutaneous three times a day before meals  tamsulosin 0.4 milliGRAM(s) Oral at bedtime  ticagrelor 90 milliGRAM(s) Oral every 12 hours    MEDICATIONS  (PRN):  acetaminophen     Tablet .. 650 milliGRAM(s) Oral every 6 hours PRN Temp greater or equal to 38C (100.4F), Mild Pain (1 - 3)

## 2021-12-08 NOTE — DIETITIAN INITIAL EVALUATION ADULT. - LITERATURE/VIDEOS GIVEN
CHO counting, diabetes label reading tips, CKD stages 3-5, Purine nutrition therapy, list of outpatient nutrition services

## 2021-12-09 ENCOUNTER — TRANSCRIPTION ENCOUNTER (OUTPATIENT)
Age: 61
End: 2021-12-09

## 2021-12-09 VITALS
OXYGEN SATURATION: 98 % | TEMPERATURE: 98 F | RESPIRATION RATE: 18 BRPM | DIASTOLIC BLOOD PRESSURE: 77 MMHG | WEIGHT: 181.44 LBS | HEART RATE: 67 BPM | SYSTOLIC BLOOD PRESSURE: 126 MMHG

## 2021-12-09 LAB — GLUCOSE BLDC GLUCOMTR-MCNC: 163 MG/DL — HIGH (ref 70–99)

## 2021-12-09 PROCEDURE — 84295 ASSAY OF SERUM SODIUM: CPT

## 2021-12-09 PROCEDURE — 82553 CREATINE MB FRACTION: CPT

## 2021-12-09 PROCEDURE — 99239 HOSP IP/OBS DSCHRG MGMT >30: CPT

## 2021-12-09 PROCEDURE — 84132 ASSAY OF SERUM POTASSIUM: CPT

## 2021-12-09 PROCEDURE — 82947 ASSAY GLUCOSE BLOOD QUANT: CPT

## 2021-12-09 PROCEDURE — 82435 ASSAY OF BLOOD CHLORIDE: CPT

## 2021-12-09 PROCEDURE — 76775 US EXAM ABDO BACK WALL LIM: CPT

## 2021-12-09 PROCEDURE — 82010 KETONE BODYS QUAN: CPT

## 2021-12-09 PROCEDURE — 36415 COLL VENOUS BLD VENIPUNCTURE: CPT

## 2021-12-09 PROCEDURE — 83036 HEMOGLOBIN GLYCOSYLATED A1C: CPT

## 2021-12-09 PROCEDURE — 70450 CT HEAD/BRAIN W/O DYE: CPT

## 2021-12-09 PROCEDURE — 86803 HEPATITIS C AB TEST: CPT

## 2021-12-09 PROCEDURE — 80048 BASIC METABOLIC PNL TOTAL CA: CPT

## 2021-12-09 PROCEDURE — 83605 ASSAY OF LACTIC ACID: CPT

## 2021-12-09 PROCEDURE — 82962 GLUCOSE BLOOD TEST: CPT

## 2021-12-09 PROCEDURE — U0003: CPT

## 2021-12-09 PROCEDURE — 83880 ASSAY OF NATRIURETIC PEPTIDE: CPT

## 2021-12-09 PROCEDURE — 93005 ELECTROCARDIOGRAM TRACING: CPT

## 2021-12-09 PROCEDURE — 83735 ASSAY OF MAGNESIUM: CPT

## 2021-12-09 PROCEDURE — U0005: CPT

## 2021-12-09 PROCEDURE — 84100 ASSAY OF PHOSPHORUS: CPT

## 2021-12-09 PROCEDURE — 85014 HEMATOCRIT: CPT

## 2021-12-09 PROCEDURE — 71045 X-RAY EXAM CHEST 1 VIEW: CPT

## 2021-12-09 PROCEDURE — 80053 COMPREHEN METABOLIC PANEL: CPT

## 2021-12-09 PROCEDURE — 87086 URINE CULTURE/COLONY COUNT: CPT

## 2021-12-09 PROCEDURE — 72125 CT NECK SPINE W/O DYE: CPT

## 2021-12-09 PROCEDURE — 96374 THER/PROPH/DIAG INJ IV PUSH: CPT

## 2021-12-09 PROCEDURE — 82330 ASSAY OF CALCIUM: CPT

## 2021-12-09 PROCEDURE — 90686 IIV4 VACC NO PRSV 0.5 ML IM: CPT

## 2021-12-09 PROCEDURE — 93306 TTE W/DOPPLER COMPLETE: CPT

## 2021-12-09 PROCEDURE — 81001 URINALYSIS AUTO W/SCOPE: CPT

## 2021-12-09 PROCEDURE — 85018 HEMOGLOBIN: CPT

## 2021-12-09 PROCEDURE — 84484 ASSAY OF TROPONIN QUANT: CPT

## 2021-12-09 PROCEDURE — 85025 COMPLETE CBC W/AUTO DIFF WBC: CPT

## 2021-12-09 PROCEDURE — 99285 EMERGENCY DEPT VISIT HI MDM: CPT

## 2021-12-09 PROCEDURE — 82803 BLOOD GASES ANY COMBINATION: CPT

## 2021-12-09 RX ORDER — INSULIN GLARGINE 100 [IU]/ML
40 INJECTION, SOLUTION SUBCUTANEOUS
Qty: 1 | Refills: 0
Start: 2021-12-09 | End: 2022-01-07

## 2021-12-09 RX ORDER — FAMOTIDINE 10 MG/ML
1 INJECTION INTRAVENOUS
Qty: 0 | Refills: 0 | DISCHARGE
Start: 2021-12-09

## 2021-12-09 RX ORDER — DULOXETINE HYDROCHLORIDE 30 MG/1
1 CAPSULE, DELAYED RELEASE ORAL
Qty: 0 | Refills: 0 | DISCHARGE
Start: 2021-12-09

## 2021-12-09 RX ORDER — INSULIN DETEMIR 100/ML (3)
50 INSULIN PEN (ML) SUBCUTANEOUS
Qty: 0 | Refills: 0 | DISCHARGE

## 2021-12-09 RX ORDER — GABAPENTIN 400 MG/1
1 CAPSULE ORAL
Qty: 0 | Refills: 0 | DISCHARGE
Start: 2021-12-09

## 2021-12-09 RX ORDER — LOSARTAN POTASSIUM 100 MG/1
1 TABLET, FILM COATED ORAL
Qty: 0 | Refills: 0 | DISCHARGE

## 2021-12-09 RX ORDER — INSULIN LISPRO 100/ML
10 VIAL (ML) SUBCUTANEOUS
Qty: 0 | Refills: 0 | DISCHARGE

## 2021-12-09 RX ORDER — DULOXETINE HYDROCHLORIDE 30 MG/1
1 CAPSULE, DELAYED RELEASE ORAL
Qty: 0 | Refills: 0 | DISCHARGE

## 2021-12-09 RX ADMIN — GABAPENTIN 300 MILLIGRAM(S): 400 CAPSULE ORAL at 05:53

## 2021-12-09 RX ADMIN — CARVEDILOL PHOSPHATE 25 MILLIGRAM(S): 80 CAPSULE, EXTENDED RELEASE ORAL at 05:53

## 2021-12-09 RX ADMIN — INFLUENZA VIRUS VACCINE 0.5 MILLILITER(S): 15; 15; 15; 15 SUSPENSION INTRAMUSCULAR at 11:25

## 2021-12-09 RX ADMIN — FAMOTIDINE 20 MILLIGRAM(S): 10 INJECTION INTRAVENOUS at 09:26

## 2021-12-09 RX ADMIN — TICAGRELOR 90 MILLIGRAM(S): 90 TABLET ORAL at 05:53

## 2021-12-09 RX ADMIN — DULOXETINE HYDROCHLORIDE 30 MILLIGRAM(S): 30 CAPSULE, DELAYED RELEASE ORAL at 09:25

## 2021-12-09 RX ADMIN — Medication 81 MILLIGRAM(S): at 09:44

## 2021-12-09 RX ADMIN — Medication 300 MILLIGRAM(S): at 09:26

## 2021-12-09 RX ADMIN — Medication 1: at 09:25

## 2021-12-09 RX ADMIN — BNT162B2 0.3 MILLILITER(S): 0.23 INJECTION, SUSPENSION INTRAMUSCULAR at 11:24

## 2021-12-09 RX ADMIN — Medication 6 UNIT(S): at 09:25

## 2021-12-09 RX ADMIN — HEPARIN SODIUM 5000 UNIT(S): 5000 INJECTION INTRAVENOUS; SUBCUTANEOUS at 06:00

## 2021-12-09 NOTE — DISCHARGE NOTE PROVIDER - PROVIDER TOKENS
FREE:[LAST:[DR Flores],FIRST:[Yung],PHONE:[(   )    -],FAX:[(   )    -],ADDRESS:[PCP]],PROVIDER:[TOKEN:[[id number]]]

## 2021-12-09 NOTE — DISCHARGE NOTE PROVIDER - CARE PROVIDER_API CALL
Yung Casper  PCP  Phone: (   )    -  Fax: (   )    -  Follow Up Time:     Steve Hernandez (MD)  Internal Medicine  [address] [place] [id number]  Phone: [phone]  Fax: [phone]  Follow Up Time:

## 2021-12-09 NOTE — DISCHARGE NOTE PROVIDER - NSDCCPCAREPLAN_GEN_ALL_CORE_FT
PRINCIPAL DISCHARGE DIAGNOSIS  Diagnosis: Chronic kidney disease, unspecified CKD stage  Assessment and Plan of Treatment: Avoid taking (NSAIDs) - (ex: Ibuprofen, Advil, Celebrex, Naprosyn)  Avoid taking any nephrotoxic agents (can harm kidneys) - Intravenous contrast for diagnostic testing, combination cold medications.  Have all medications adjusted for your renal function by your Health Care Provider.  Blood pressure control is important.  Take all medication as prescribed.        SECONDARY DISCHARGE DIAGNOSES  Diagnosis: Hypertension  Assessment and Plan of Treatment: Follow up with your medical doctor to establish long term blood pressure treatment goals.      Diagnosis: BPH without urinary obstruction  Assessment and Plan of Treatment: c/w Flomax    Diagnosis: History of depression  Assessment and Plan of Treatment: c/w cymbalta    Diagnosis: Hyperglycemia  Assessment and Plan of Treatment: HgA1C this admission.  Make sure you get your HgA1c checked every three months.  If you take oral diabetes medications, check your blood glucose two times a day.  If you take insulin, check your blood glucose before meals and at bedtime.  It's important not to skip any meals.  Keep a log of your blood glucose results and always take it with you to your doctor appointments.  Keep a list of your current medications including injectables and over the counter medications and bring this medication list with you to all your doctor appointments.  If you have not seen your opthalmologist this year call for appointment.  Check your feet daily for redness, sores, or openings. Do not self treat. If no improvement in two days call your primary care physician for an appointment.  Low blood sugar (hypoglycemia) is a blood sugar below 70mg/dl. Check your blood sugar if you feel signs/symptoms of hypoglycemia. If your blood sugar is below 70 take 15 grams of carbohydrates (ex 4 oz of apple juice, 3-4 glucosr tablets, or 4-6 oz of regular soda) wait 15 minutes and repeat blood sugar to make sure it comes up above 70.  If your blood sugar is above 70 and you are due for a meal, have a meal.  If you are not due for a meal have a snack.  This snack helps keeps your blood sugar at a safe range.

## 2021-12-09 NOTE — DISCHARGE NOTE NURSING/CASE MANAGEMENT/SOCIAL WORK - PATIENT PORTAL LINK FT
You can access the FollowMyHealth Patient Portal offered by [facility] by registering at the following website: [email or web address]. By joining [email or web address]’s FollowMyHealth portal, you will also be able to view your health information using other applications (apps) compatible with our system.

## 2021-12-09 NOTE — DISCHARGE NOTE NURSING/CASE MANAGEMENT/SOCIAL WORK - NSDCVIVACCINE_GEN_ALL_CORE_FT
No Vaccines Administered. COVID-19, mRNA, LNP-S, PF, 30 mcg/0.3 mL dose (Pfizer); 09-Dec-2021 11:24; Sobeida Kang (RN); [employer]; [id number] (Exp. Date: 31-Dec-2021); IntraMuscular; Deltoid Left.; 0.3 milliLiter(s);   influenza, injectable, quadrivalent, preservative free; 09-Dec-2021 11:25; Sobeida Kang (SELVIN); [employer]; [id number] (Exp. Date: 30-Jun-2022); IntraMuscular; Deltoid Right.; 0.5 milliLiter(s); VIS (VIS Published: 06-Aug-2021, VIS Presented: 09-Dec-2021);

## 2021-12-09 NOTE — PROGRESS NOTE ADULT - REASON FOR ADMISSION
Sent in from an outpatient office for apparent FS of 480 with episode of nausea.

## 2021-12-09 NOTE — DISCHARGE NOTE PROVIDER - NSDCMRMEDTOKEN_GEN_ALL_CORE_FT
allopurinol 300 mg oral tablet: 1 tab(s) orally once a day  aspirin 81 mg oral delayed release tablet: 1 tab(s) orally once a day  atorvastatin 80 mg oral tablet: 1 tab(s) orally once a day (at bedtime)  Brilinta (ticagrelor) 90 mg oral tablet: 1 tab(s) orally 2 times a day  colchicine 0.6 mg oral tablet: 1 tab(s) orally every 48 hours  Coreg 25 mg oral tablet: 1 tab(s) orally 2 times a day  Cymbalta 30 mg oral delayed release capsule: 1 cap(s) orally once a day  gabapentin 300 mg oral capsule: 1 cap(s) orally 3 times a day  HumaLOG 100 units/mL subcutaneous solution: 10 unit(s) subcutaneous 3 times a day (before meals)  Levemir 100 units/mL subcutaneous solution: 50 unit(s) subcutaneous once a day (at bedtime)  losartan 25 mg oral tablet: 1 tab(s) orally once a day  pantoprazole 40 mg oral delayed release tablet: 1 tab(s) orally once a day (before a meal)  tamsulosin 0.4 mg oral capsule: 1 cap(s) orally once a day (at bedtime)  torsemide 20 mg oral tablet: 2 tab(s) orally 2 times a day   allopurinol 300 mg oral tablet: 1 tab(s) orally once a day  aspirin 81 mg oral delayed release tablet: 1 tab(s) orally once a day  atorvastatin 80 mg oral tablet: 1 tab(s) orally once a day (at bedtime)  Basaglar KwikPen 100 units/mL subcutaneous solution: 40 unit(s) subcutaneous once a day (at bedtime)   Brilinta (ticagrelor) 90 mg oral tablet: 1 tab(s) orally 2 times a day  Coreg 25 mg oral tablet: 1 tab(s) orally 2 times a day  DULoxetine 30 mg oral delayed release capsule: 1 cap(s) orally once a day  famotidine 20 mg oral tablet: 1 tab(s) orally once a day  gabapentin 300 mg oral capsule: 1 cap(s) orally 2 times a day  HumaLOG 100 units/mL subcutaneous solution: 8 unit(s) subcutaneous 3 times a day (before meals)  pantoprazole 40 mg oral delayed release tablet: 1 tab(s) orally once a day (before a meal)  tamsulosin 0.4 mg oral capsule: 1 cap(s) orally once a day (at bedtime)

## 2021-12-09 NOTE — PROGRESS NOTE ADULT - ASSESSMENT
Patient is a 61 year old female with a history of CAD S/P past PCI on dual antiplatelet therapy, ischemic cardiomyopathy with moderate LV dysfunction (46%) with s/p cardiomems, chronic kidney disease last Cr in 2018, essential HTN, history of depressio, type 2 DM on insulin with apparent complicating retinopathy and neuropathy, history of gout, several year history of neck pain with past MRI with cervical stenosis, with the patient sent in from an outpatient office (he does not know the physician who referred him to the ER) with sugars in the 400s. 
60 yo male c DM HTN decreased EF pw nausea  This could all be related to uremia at present.  Pt has had noncompliance with is health in general   Hyponatremia   Metabolic acidosis   Anemia   Double digit [id number]--Wzsjmikzipmx blood sugars     1 Renal- If he is not having persistent nausea then can likely dc with close outpt follow up which entails AVF ASAP   (Pt is not very educated re his health and will need a lot of coaching)  There is a high likely roberts of HD in the next 6 months   2 [facility]-Cardiac cath p he starts HD  3 Anemia-Check iron stores   4 Vasc-Outpt AVF asap   5 Endo-Plan per endo    He is to see Dr Chan in 2 weeks from DC        Sayed Wefunder   [phone]         
62 yo male c DM HTN decreased EF pw nausea  This could all be related to uremia at present.  Pt has had noncompliance with is health in general   Hyponatremia   Metabolic acidosis   Anemia   Double digit FVI6S--Srdxpgxnwcdh blood sugars     1 Renal- If he is not having persistent nausea then can likely dc with close outpt follow up which entails AVF ASAP   (Pt is not very educated re his health and will need a lot of coaching)  There is a high likely roberts of HD in the next 6 months   I called his wife this am but it went to voice   2 [facility]-Cardiac cath p he starts HD  3 Anemia-Check iron stores   4 Vasc-Outpt AVF asap   5 Endo-Had adjustment of blood sugars so will likely end up staying today        [facility]   [phone]         
ADAM KOWALSKI is a 61y old man with poorly controlled Type 2 DM, A1C 11.8% on admission, follows up with myself (Dr. Hernandez) outpatient, complicated by CKD, CAD, retinopathy, neuropathy, here for hyperglycemia.     1. Type 2 diabetes mellitus with hyperglycemia.   DM care complicated by severe depression, lack of motivations, poor dietary habits as well as poor endocrine follow up.  Patient states that he is adherent with his DM regimen but glucose persistently elevated likely due to high Carb meals despite extensive counseling in the past.   Discussed with patient the importance of Carb consistent diet and exercise as tolerated.    Recommend nutritional consultation  Recommend DM education through RN staff (see physical to RN order)  Discussed glycemic goal of a1c <7% to prevent microvascular complications of diabetes mellitus and reduce the risk of macrovascular complications.   Recommend annual podiatry and ophthalmology follow up.   Glucose goal of 80-180mg/dl while in patient.   Recommend to increase Lantus to 40 units bedtime  Recommend Admelog 8 units tid with meals  Recommend LDSS (1:50 above 150mg/dl) and night time low does sliding scale for hyperglycemia corrections    Discharge recommendation/considerations:  Patient to be discharged home on basal bolus regimen.   I'll try arrange patient to be seen sooner after discharge.    2.  Hypertension.   BP goal 130/80  Management as per primary and renal team.    3. Hyperlipidemia.   Continue with statin therapy.   Patient had a history of high triglyceride level in the past
Patient is a 61 year old female with a history of CAD S/P past PCI on dual antiplatelet therapy, ischemic cardiomyopathy with moderate LV dysfunction (46%) with s/p cardiomems, chronic kidney disease last Cr in 2018, essential HTN, history of depressio, type 2 DM on insulin with apparent complicating retinopathy and neuropathy, history of gout, several year history of neck pain with past MRI with cervical stenosis, with the patient sent in from an outpatient office (he does not know the physician who referred him to the ER) with sugars in the 400s.

## 2021-12-09 NOTE — CHART NOTE - NSCHARTNOTEFT_GEN_A_CORE
Patient medically ready for discharge  Insulin regimen per endo  outpatient renal f/u   discussed with wife on 12/8  discharge today, 1 hour spent in preparation of discharge    Krysten Bautista D.O.  Hospitalist- available on MS teams

## 2021-12-09 NOTE — DISCHARGE NOTE PROVIDER - HOSPITAL COURSE
61y old man with poorly controlled Type 2 DM, A1C 11.8% on admission, complicated by CKD, CAD, retinopathy, neuropathy,      admitted for Nausea and/or vomiting.  found to have hyperglycemia ,   Presentation not consistent with DKA.  Upgraded to telemetry to exclude cardiac equivalent.    Echo with  Mild segmental left ventricular systolic dysfunction. and Mild diastolic dysfunction (Stage I).  Evaluated by Endo , meds adjusted hyperglycemia  and symptoms resolved , will be discharged home  with Home care with close follow up form Endo.     61y old man with poorly controlled Type 2 DM, A1C 11.8% on admission, complicated by CKD, CAD, retinopathy, neuropathy,      admitted for Nausea and/or vomiting.  found to have hyperglycemia ,   Presentation not consistent with DKA.  Upgraded to telemetry to exclude cardiac equivalent.    Echo with  Mild segmental left ventricular systolic dysfunction. and Mild diastolic dysfunction (Stage I).  Evaluated by Endo , meds adjusted hyperglycemia  and symptoms resolved , will be discharged home  with Home care with close follow up from Endo.

## 2021-12-09 NOTE — DISCHARGE NOTE NURSING/CASE MANAGEMENT/SOCIAL WORK - NSDCPEFALRISK_GEN_ALL_CORE
For information on Fall & Injury Prevention, visit: [email or web address] OR  [email or web address] OR  https://www.cdc.gov/steadi/patient.html

## 2021-12-09 NOTE — PROGRESS NOTE ADULT - SUBJECTIVE AND OBJECTIVE BOX
Patient has no complaints.    GENERAL: No fevers, no chills.  EYES: No blurry vision,  No photophobia  ENT: No sore throat.  No dysphagia  Cardiovascular: No chest pain, palpitations, orthopnea  Pulmonary: No cough, no wheezing. No shortness of breath  Gastrointestinal: No abdominal pain, no diarrhea, no constipation.   Musculoskeletal: No weakness.  No myalgias.  Dermatology:  No rashes.  Neuro: No Headache.  No vertigo.  No dizziness.  Psych: No anxiety, no depression.  Denies suicidal thoughts.    MEDICATIONS  (STANDING):  allopurinol 300 milliGRAM(s) Oral daily  aspirin enteric coated 81 milliGRAM(s) Oral daily  atorvastatin 80 milliGRAM(s) Oral at bedtime  carvedilol 25 milliGRAM(s) Oral every 12 hours  dextrose 40% Gel 15 Gram(s) Oral once  dextrose 5%. 1000 milliLiter(s) (50 mL/Hr) IV Continuous <Continuous>  dextrose 5%. 1000 milliLiter(s) (100 mL/Hr) IV Continuous <Continuous>  dextrose 50% Injectable 25 Gram(s) IV Push once  dextrose 50% Injectable 12.5 Gram(s) IV Push once  dextrose 50% Injectable 25 Gram(s) IV Push once  DULoxetine 30 milliGRAM(s) Oral daily  famotidine    Tablet 20 milliGRAM(s) Oral daily  gabapentin 300 milliGRAM(s) Oral two times a day  glucagon  Injectable 1 milliGRAM(s) IntraMuscular once  heparin   Injectable 5000 Unit(s) SubCutaneous every 8 hours  influenza   Vaccine 0.5 milliLiter(s) IntraMuscular once  insulin glargine Injectable (LANTUS) 40 Unit(s) SubCutaneous at bedtime  insulin lispro (ADMELOG) corrective regimen sliding scale   SubCutaneous three times a day before meals  insulin lispro (ADMELOG) corrective regimen sliding scale   SubCutaneous at bedtime  insulin lispro Injectable (ADMELOG) 6 Unit(s) SubCutaneous three times a day before meals  tamsulosin 0.4 milliGRAM(s) Oral at bedtime  ticagrelor 90 milliGRAM(s) Oral every 12 hours    MEDICATIONS  (PRN):  acetaminophen     Tablet .. 650 milliGRAM(s) Oral every 6 hours PRN Temp greater or equal to 38C (100.4F), Mild Pain (1 - 3)    Vital Signs Last 24 Hrs  T(C): 36.6 (08 Dec 2021 12:14), Max: 36.6 (08 Dec 2021 12:14)  T(F): 97.8 (08 Dec 2021 12:14), Max: 97.8 (08 Dec 2021 12:14)  HR: 75 (08 Dec 2021 12:14) (61 - 79)  BP: 110/63 (08 Dec 2021 12:14) (110/63 - 147/88)  BP(mean): --  RR: 18 (08 Dec 2021 12:14) (18 - 18)  SpO2: 97% (08 Dec 2021 12:14) (97% - 99%)    GENERAL: NAD  HEAD:  Atraumatic, Normocephalic  EYES: EOMI, PERRLA, conjunctiva and sclera clear  ENT: Pharynx not erythematous  PULMONARY: Clear to auscultation bilaterally; No wheeze  CARDIOVASCULAR: Regular rate and rhythm; No murmurs, rubs, or gallops  ABDOMEN: Soft, Nontender, Nondistended; Bowel sounds present  EXTREMITIES:  2+ Peripheral Pulses, No clubbing, cyanosis, or edema  MUSCULOSKELETAL: No calf tenderness  PSYCH: AAOx3, normal affect   SKIN: warm and dry, No rashes or lesions    .  LABS:                         10.5   6.07  )-----------( 215      ( 07 Dec 2021 05:29 )             31.4     12-08    134<L>  |  103  |  86<H>  ----------------------------<  215<H>  4.5   |  17<L>  |  4.32<H>    Ca    8.5      08 Dec 2021 07:12        Urinalysis Basic - ( 06 Dec 2021 20:12 )    Color: Light Yellow / Appearance: Slightly Turbid / S.010 / pH: x  Gluc: x / Ketone: Negative  / Bili: Negative / Urobili: Negative   Blood: x / Protein: 300 mg/dL / Nitrite: Negative   Leuk Esterase: Negative / RBC: 1 /hpf / WBC 4 /HPF   Sq Epi: x / Non Sq Epi: 5 /hpf / Bacteria: FEW        Lactate, Blood: 1.1 mmol/L (1208 @ 07:09)      RADIOLOGY, EKG & ADDITIONAL TESTS: Reviewed.   
DATE OF SERVICE: 12-09-21 @ 21:28    Patient is a 61y old  Male who presents with a chief complaint of Sent in from an outpatient office for apparent FS of 480 with episode of nausea. (09 Dec 2021 10:34)      INTERVAL HISTORY: feels ok    REVIEW OF SYSTEMS:  CONSTITUTIONAL: No weakness  EYES/ENT: No visual changes;  No throat pain   NECK: No pain or stiffness  RESPIRATORY: No cough, wheezing; No shortness of breath  CARDIOVASCULAR: No chest pain or palpitations  GASTROINTESTINAL: No abdominal  pain. No nausea, vomiting, or hematemesis  GENITOURINARY: No dysuria, frequency or hematuria  NEUROLOGICAL: No stroke like symptoms  SKIN: No rashes      TELEMETRY Personally reviewed: no events  	  MEDICATIONS:  carvedilol 25 milliGRAM(s) Oral every 12 hours  tamsulosin 0.4 milliGRAM(s) Oral at bedtime        PHYSICAL EXAM:  T(C): 36.4 (12-09-21 @ 04:18), Max: 36.4 (12-09-21 @ 04:18)  HR: 67 (12-09-21 @ 04:18) (67 - 67)  BP: 126/77 (12-09-21 @ 04:18) (126/77 - 126/77)  RR: 18 (12-09-21 @ 04:18) (18 - 18)  SpO2: 98% (12-09-21 @ 04:18) (98% - 98%)  Wt(kg): --  I&O's Summary    08 Dec 2021 07:01  -  09 Dec 2021 07:00  --------------------------------------------------------  IN: 360 mL / OUT: 1550 mL / NET: -1190 mL          Appearance: In no distress	  HEENT:    PERRL, EOMI	  Cardiovascular:  S1 S2, No JVD  Respiratory: Lungs clear to auscultation	  Gastrointestinal:  Soft, Non-tender, + BS	  Vascularature:  No edema of LE  Psychiatric: Appropriate affect   Neuro: no acute focal deficits           12-08    134<L>  |  103  |  86<H>  ----------------------------<  215<H>  4.5   |  17<L>  |  4.32<H>    Ca    8.5      08 Dec 2021 07:12          Labs personally reviewed      BRAIN CT : No intracranial hemorrhage. No major vessel distribution infarct.  CERVICAL SPINE CT: No acute fracture or traumatic subluxation  .  < from: Transthoracic Echocardiogram (12.07.21 @ 11:24) >  EF (Visual Estimate): 40-45 %    < end of copied text >  < from: Transthoracic Echocardiogram (12.07.21 @ 11:24) >  1. Normal mitral valve. Minimal mitral regurgitation.  2. Calcified trileaflet aortic valve with normal opening.  Minimal aortic regurgitation.  3. Mild segmental left ventricular systolic dysfunction.  The basal inferior, basal septum, distal septum, distal  inferior, and the apex are akinetic. Recommend limited  repeat imaging with intravenous echo contrast to better  visualize the apex. (Per sonographer, patient did not agree  to echo contrast during the study.)  4. Mild diastolic dysfunction (Stage I).  5. Normal right ventricular size and function.    < end of copied text >    < from: Transthoracic Echocardiogram (07.27.18 @ 07:05) >  . Mild segmental left ventricular systolic dysfunction.  Hypokinesis of apex, distal septum and distal inferior  wall. Global LV function is perserved.  2. Mild diastolic dysfunction (Stage I).  3. Normal right ventricular size and function.    < end of copied text >    Assessment /Plan:     Patient is a 61 year old female with a history of CAD S/P past PCI reports 4 stents last one 3-4 years ago, on dual antiplatelet therapy, ischemic cardiomyopathy with moderate LV dysfunction (46%) with s/p cardiomems, most recent records at [facility] note HFpEF chronic kidney disease last Cr in 2018, essential HTN, history of depression, type 2 DM on insulin with apparent complicating retinopathy and neuropathy, history of gout, several year history of neck pain with past MRI with cervical stenosis, with the patient sent in from an outpatient office post vomiting with hyperglycemia. Pt reports being followed by Dr. oTrres at [facility] with whom he had normal echo and stress test about 3 months ago. Pt denies smoking. He is able to up one flight of stairs at his own pace. He uses the walker for long distance and denies CP and SOB but reports occasional fatigue. Pt currently denies any CP/ NV/ SOB/ palpitations.     Problem/Plan - 1:  ·  Problem: : Elevated  Troponin likely in the setting of CKD, -trops have peaked at 120, latest one 104  - denies chest pain  - Prior hx of CAD s/p PCI to LAD and D1  - NM PET stress test at [facility] 0 7/2021 with mod-severe degree of myocardial scarring if anteroseptal and apical walls  - TTE 5/2021 at [facility] with EF 40-45%, hypokinesis of the apical septal, apical lateral, and apex  - TTE here revealed -- Mild segmental left ventricular systolic dysfunction. The basal inferior, basal septum, distal septum, distal inferior, and the apex are akinetic. Mild diastolic dysfunction.  - Prior imaging at [facility] overall consistent with imaging here, will hold off further ischemic eval at this time  - outpt follow up with his cardio [facility]    Problem/Plan - 2:  ·  Problem: CAD  hx of PCI   - Prior hx of CAD s/p PCI to LAD and D1  - c/w ASA and Brilinta   - c/w statin     Problem/Plan - 3:  ·  Problem:  Hypertension.   - BP stable  - c/w coreg q12h.  - ARB and torsemide on hold 2/2 LEYDA     Problem/Plan - 4:  ·  Problem: Type 2 diabetes mellitus with hyperglycemia.   -  ISS   - fs achs  - A1C 11.8   - Endo consult appreciated     Problem/Plan - 5:  ·  Problem:  Acute kidney injury superimposed on CKD.   - ARB and torsemide on hold   - renal US as noted above   - renal consult    Problem/Plan - 6:  ·  Problem: Hyperlipidemia.    - c/w atorvastatin daily.    Problem/Plan - 7:  ·  Problem: DVT ppx Heparin SQ     I had a prolonged conversation with the patient regarding hospital course, differential diagnosis and results of diagnostic tests.  Plan of care discussed with patient after the evaluation. Patient expresses clear understanding and satisfaction with the plan of care. OMT on six regions for acute somatic dysfunctions done at the bedside. Sixty five minutes spent on encounter, of which more than fifty percent of the encounter was spent on counseling and/or coordinating care by the attending physician.    Anthony Walters DO [facility]  Cardiovascular Medicine  [address] [place] [address], [address]  Office: [phone]  Cell: [phone]
NEPHROLOGY-[facility] [phone]        Patient seen and examined in bed.  He was about the same         MEDICATIONS  (STANDING):  allopurinol 300 milliGRAM(s) Oral daily  aspirin enteric coated 81 milliGRAM(s) Oral daily  atorvastatin 80 milliGRAM(s) Oral at bedtime  carvedilol 25 milliGRAM(s) Oral every 12 hours  dextrose 40% Gel 15 Gram(s) Oral once  dextrose 5%. 1000 milliLiter(s) (50 mL/Hr) IV Continuous <Continuous>  dextrose 5%. 1000 milliLiter(s) (100 mL/Hr) IV Continuous <Continuous>  dextrose 50% Injectable 25 Gram(s) IV Push once  dextrose 50% Injectable 12.5 Gram(s) IV Push once  dextrose 50% Injectable 25 Gram(s) IV Push once  DULoxetine 30 milliGRAM(s) Oral daily  famotidine    Tablet 20 milliGRAM(s) Oral daily  gabapentin 300 milliGRAM(s) Oral two times a day  glucagon  Injectable 1 milliGRAM(s) IntraMuscular once  heparin   Injectable 5000 Unit(s) SubCutaneous every 8 hours  influenza   Vaccine 0.5 milliLiter(s) IntraMuscular once  insulin glargine Injectable (LANTUS) 40 Unit(s) SubCutaneous at bedtime  insulin lispro (ADMELOG) corrective regimen sliding scale   SubCutaneous three times a day before meals  insulin lispro (ADMELOG) corrective regimen sliding scale   SubCutaneous at bedtime  insulin lispro Injectable (ADMELOG) 6 Unit(s) SubCutaneous three times a day before meals  tamsulosin 0.4 milliGRAM(s) Oral at bedtime  ticagrelor 90 milliGRAM(s) Oral every 12 hours      VITAL:  T(C): , Max: 36.5 (21 @ 12:55)  T(F): , Max: 97.7 (21 @ 12:55)  HR: 72 (21 @ 04:28)  BP: 136/83 (21 @ 04:28)  BP(mean): --  RR: 18 (21 @ 04:28)  SpO2: 98% (21 @ 04:28)  Wt(kg): --    I and O's:     @ 07:01  -   @ 07:00  --------------------------------------------------------  IN: 540 mL / OUT: 0 mL / NET: 540 mL          PHYSICAL EXAM:    Constitutional: NAD  Neck:  No JVD  Respiratory: CTAB/L  Cardiovascular: S1 and S2  Gastrointestinal: BS+, soft, NT/ND  Extremities: No peripheral edema  Neurological: A/O x 3, no focal deficits  Psychiatric: Normal mood, normal affect  : No Jay  Skin: No rashes  Access: Not applicable    LABS:                        10.5   6.07  )-----------( 215      ( 07 Dec 2021 05:29 )             31.4     12-08    134<L>  |  103  |  86<H>  ----------------------------<  215<H>  4.5   |  17<L>  |  4.32<H>    Ca    8.5      08 Dec 2021 07:12  Phos  4.8     12-  Mg     2.6     12-    TPro  7.0  /  Alb  4.2  /  TBili  0.4  /  DBili  x   /  AST  14  /  ALT  17  /  AlkPhos  125<H>  12-06          Urine Studies:  Urinalysis Basic - ( 06 Dec 2021 20:12 )    Color: Light Yellow / Appearance: Slightly Turbid / S.010 / pH: x  Gluc: x / Ketone: Negative  / Bili: Negative / Urobili: Negative   Blood: x / Protein: 300 mg/dL / Nitrite: Negative   Leuk Esterase: Negative / RBC: 1 /hpf / WBC 4 /HPF   Sq Epi: x / Non Sq Epi: 5 /hpf / Bacteria: FEW            RADIOLOGY & ADDITIONAL STUDIES:            
DATE OF SERVICE: 12-08-21 @ 22:25    Patient is a 61y old  Male who presents with a chief complaint of Sent in from an outpatient office for apparent FS of 480 with episode of nausea. (08 Dec 2021 14:15)      INTERVAL HISTORY: feels ok    TELEMETRY Personally reviewed: no events    REVIEW OF SYSTEMS:  CONSTITUTIONAL: No weakness  EYES/ENT: No visual changes;  No throat pain   NECK: No pain or stiffness  RESPIRATORY: No cough, wheezing; No shortness of breath  CARDIOVASCULAR: No chest pain or palpitations  GASTROINTESTINAL: No abdominal  pain. No nausea, vomiting, or hematemesis  GENITOURINARY: No dysuria, frequency or hematuria  NEUROLOGICAL: No stroke like symptoms  SKIN: No rashes    	  MEDICATIONS:  carvedilol 25 milliGRAM(s) Oral every 12 hours  tamsulosin 0.4 milliGRAM(s) Oral at bedtime        PHYSICAL EXAM:  T(C): 36.5 (12-08-21 @ 20:32), Max: 36.6 (12-08-21 @ 12:14)  HR: 85 (12-08-21 @ 20:32) (61 - 85)  BP: 124/73 (12-08-21 @ 20:32) (110/63 - 147/88)  RR: 18 (12-08-21 @ 20:32) (18 - 18)  SpO2: 97% (12-08-21 @ 20:32) (97% - 99%)  Wt(kg): --  I&O's Summary    07 Dec 2021 07:01  -  08 Dec 2021 07:00  --------------------------------------------------------  IN: 540 mL / OUT: 250 mL / NET: 290 mL    08 Dec 2021 07:01  -  08 Dec 2021 22:25  --------------------------------------------------------  IN: 360 mL / OUT: 900 mL / NET: -540 mL          Appearance: In no distress	  HEENT:    PERRL, EOMI	  Cardiovascular:  S1 S2, No JVD  Respiratory: Lungs clear to auscultation	  Gastrointestinal:  Soft, Non-tender, + BS	  Vascularature:  No edema of LE  Psychiatric: Appropriate affect   Neuro: no acute focal deficits                               10.5   6.07  )-----------( 215      ( 07 Dec 2021 05:29 )             31.4     12-08    134<L>  |  103  |  86<H>  ----------------------------<  215<H>  4.5   |  17<L>  |  4.32<H>    Ca    8.5      08 Dec 2021 07:12          Labs personally reviewed      BRAIN CT : No intracranial hemorrhage. No major vessel distribution infarct.  CERVICAL SPINE CT: No acute fracture or traumatic subluxation  .  < from: Transthoracic Echocardiogram (12.07.21 @ 11:24) >  EF (Visual Estimate): 40-45 %    < end of copied text >  < from: Transthoracic Echocardiogram (12.07.21 @ 11:24) >  1. Normal mitral valve. Minimal mitral regurgitation.  2. Calcified trileaflet aortic valve with normal opening.  Minimal aortic regurgitation.  3. Mild segmental left ventricular systolic dysfunction.  The basal inferior, basal septum, distal septum, distal  inferior, and the apex are akinetic. Recommend limited  repeat imaging with intravenous echo contrast to better  visualize the apex. (Per sonographer, patient did not agree  to echo contrast during the study.)  4. Mild diastolic dysfunction (Stage I).  5. Normal right ventricular size and function.    < end of copied text >    < from: Transthoracic Echocardiogram (07.27.18 @ 07:05) >  . Mild segmental left ventricular systolic dysfunction.  Hypokinesis of apex, distal septum and distal inferior  wall. Global LV function is perserved.  2. Mild diastolic dysfunction (Stage I).  3. Normal right ventricular size and function.    < end of copied text >    Assessment /Plan:     Patient is a 61 year old female with a history of CAD S/P past PCI reports 4 stents last one 3-4 years ago, on dual antiplatelet therapy, ischemic cardiomyopathy with moderate LV dysfunction (46%) with s/p cardiomems, most recent records at [facility] note HFpEF chronic kidney disease last Cr in 2018, essential HTN, history of depression, type 2 DM on insulin with apparent complicating retinopathy and neuropathy, history of gout, several year history of neck pain with past MRI with cervical stenosis, with the patient sent in from an outpatient office post vomiting with hyperglycemia. Pt reports being followed by Dr. Torres at [facility] with whom he had normal echo and stress test about 3 months ago. Pt denies smoking. He is able to up one flight of stairs at his own pace. He uses the walker for long distance and denies CP and SOB but reports occasional fatigue. Pt currently denies any CP/ NV/ SOB/ palpitations.     Problem/Plan - 1:  ·  Problem: : Elevated  Troponin likely in the setting of CKD, -trops have peaked at 120, latest one 104  - denies chest pain  - Prior hx of CAD s/p PCI to LAD and D1  - NM PET stress test at [facility] 0 7/2021 with mod-severe degree of myocardial scarring if anteroseptal and apical walls  - TTE 5/2021 at [facility] with EF 40-45%, hypokinesis of the apical septal, apical lateral, and apex  - TTE here revealed -- Mild segmental left ventricular systolic dysfunction. The basal inferior, basal septum, distal septum, distal inferior, and the apex are akinetic. Mild diastolic dysfunction.  - Prior imaging at [facility] overall consistent with imaging here, will hold off further ischemic eval at this time    Problem/Plan - 2:  ·  Problem: CAD  hx of PCI   - Prior hx of CAD s/p PCI to LAD and D1  - c/w ASA and Brilinta   - c/w statin     Problem/Plan - 3:  ·  Problem:  Hypertension.   - BP stable  - c/w coreg q12h.  - ARB and torsemide on hold 2/2 LEYDA     Problem/Plan - 4:  ·  Problem: Type 2 diabetes mellitus with hyperglycemia.   -  ISS   - fs achs  - A1C 11.8   - Endo consult appreciated     Problem/Plan - 5:  ·  Problem:  Acute kidney injury superimposed on CKD.   - ARB and torsemide on hold   - renal US as noted above   - renal consult    Problem/Plan - 6:  ·  Problem: Hyperlipidemia.    - c/w atorvastatin daily.    Problem/Plan - 7:  ·  Problem: DVT ppx Heparin SQ       I had a prolonged conversation with the patient regarding hospital course, differential diagnosis and results of diagnostic tests.  Plan of care discussed with patient after the evaluation. Patient expresses clear understanding and satisfaction with the plan of care. OMT on six regions for acute somatic dysfunctions done at the bedside. Sixty five minutes spent on encounter, of which more than fifty percent of the encounter was spent on counseling and/or coordinating care by the attending physician.      Anthony Walters DO [facility]  Cardiovascular Medicine  [address] [place] [address], [address]  Office: [phone]  Cell: [phone]
NEPHROLOGY-[facility] [phone]        Patient seen and examined in bed.  He was in good spirits   No more nausea         MEDICATIONS  (STANDING):  allopurinol 300 milliGRAM(s) Oral daily  aspirin enteric coated 81 milliGRAM(s) Oral daily  atorvastatin 80 milliGRAM(s) Oral at bedtime  carvedilol 25 milliGRAM(s) Oral every 12 hours  coronavirus Vaccine (PFIZER) 0.3 milliLiter(s) IntraMuscular once  dextrose 40% Gel 15 Gram(s) Oral once  dextrose 5%. 1000 milliLiter(s) (50 mL/Hr) IV Continuous <Continuous>  dextrose 5%. 1000 milliLiter(s) (100 mL/Hr) IV Continuous <Continuous>  dextrose 50% Injectable 25 Gram(s) IV Push once  dextrose 50% Injectable 12.5 Gram(s) IV Push once  dextrose 50% Injectable 25 Gram(s) IV Push once  DULoxetine 30 milliGRAM(s) Oral daily  famotidine    Tablet 20 milliGRAM(s) Oral daily  gabapentin 300 milliGRAM(s) Oral two times a day  glucagon  Injectable 1 milliGRAM(s) IntraMuscular once  heparin   Injectable 5000 Unit(s) SubCutaneous every 8 hours  influenza   Vaccine 0.5 milliLiter(s) IntraMuscular once  insulin glargine Injectable (LANTUS) 40 Unit(s) SubCutaneous at bedtime  insulin lispro (ADMELOG) corrective regimen sliding scale   SubCutaneous three times a day before meals  insulin lispro (ADMELOG) corrective regimen sliding scale   SubCutaneous at bedtime  insulin lispro Injectable (ADMELOG) 6 Unit(s) SubCutaneous three times a day before meals  tamsulosin 0.4 milliGRAM(s) Oral at bedtime  ticagrelor 90 milliGRAM(s) Oral every 12 hours      VITAL:  T(C): , Max: 36.6 (12-08-21 @ 12:14)  T(F): , Max: 97.8 (12-08-21 @ 12:14)  HR: 67 (12-09-21 @ 04:18)  BP: 126/77 (12-09-21 @ 04:18)  BP(mean): --  RR: 18 (12-09-21 @ 04:18)  SpO2: 98% (12-09-21 @ 04:18)  Wt(kg): --    I and O's:    12-08 @ 07:01 - 12-09 @ 07:00  --------------------------------------------------------  IN: 360 mL / OUT: 1550 mL / NET: -1190 mL          PHYSICAL EXAM:    Constitutional: NAD  Neck:  No JVD  Respiratory: CTAB/L  Cardiovascular: S1 and S2  Gastrointestinal: BS+, soft, NT/ND  Extremities: No peripheral edema  Neurological: A/O x 3, no focal deficits  Psychiatric: Normal mood, normal affect  : No Jay  Skin: No rashes  Access: Not applicable    LABS:    12-08    134<L>  |  103  |  86<H>  ----------------------------<  215<H>  4.5   |  17<L>  |  4.32<H>    Ca    8.5      08 Dec 2021 07:12            Urine Studies:          RADIOLOGY & ADDITIONAL STUDIES:            
Contact info:   Steve Hernandez MD  pager [phone], please provide 10 digit call back number.   You may also contact me on [employer] Teams during business hours today.   Please note that this patient may be followed by another provider tomorrow.   If no answer or after hours, please contact [phone]    Interval History/Subjective:  Reports good appetite  Being seen by nutrition team. Glucose had been tightly controlled while in patient due to better diet here.     MEDICATIONS  (STANDING):  allopurinol 300 milliGRAM(s) Oral daily  aspirin enteric coated 81 milliGRAM(s) Oral daily  atorvastatin 80 milliGRAM(s) Oral at bedtime  carvedilol 25 milliGRAM(s) Oral every 12 hours  dextrose 40% Gel 15 Gram(s) Oral once  dextrose 5%. 1000 milliLiter(s) (50 mL/Hr) IV Continuous <Continuous>  dextrose 5%. 1000 milliLiter(s) (100 mL/Hr) IV Continuous <Continuous>  dextrose 50% Injectable 25 Gram(s) IV Push once  dextrose 50% Injectable 12.5 Gram(s) IV Push once  dextrose 50% Injectable 25 Gram(s) IV Push once  DULoxetine 30 milliGRAM(s) Oral daily  famotidine    Tablet 20 milliGRAM(s) Oral daily  gabapentin 300 milliGRAM(s) Oral two times a day  glucagon  Injectable 1 milliGRAM(s) IntraMuscular once  heparin   Injectable 5000 Unit(s) SubCutaneous every 8 hours  influenza   Vaccine 0.5 milliLiter(s) IntraMuscular once  insulin glargine Injectable (LANTUS) 40 Unit(s) SubCutaneous at bedtime  insulin lispro (ADMELOG) corrective regimen sliding scale   SubCutaneous three times a day before meals  insulin lispro (ADMELOG) corrective regimen sliding scale   SubCutaneous at bedtime  insulin lispro Injectable (ADMELOG) 6 Unit(s) SubCutaneous three times a day before meals  tamsulosin 0.4 milliGRAM(s) Oral at bedtime  ticagrelor 90 milliGRAM(s) Oral every 12 hours    MEDICATIONS  (PRN):  acetaminophen     Tablet .. 650 milliGRAM(s) Oral every 6 hours PRN Temp greater or equal to 38C (100.4F), Mild Pain (1 - 3)      Allergies    No Known Allergies    Intolerances      Review of Systems:  Constitutional: No fever  Eyes: No blurry vision  Neuro: No tremors  HEENT: No pain  Cardiovascular: No chest pain, palpitations  Respiratory: No SOB, no cough  GI: No nausea, vomiting, abdominal pain  : No dysuria  Skin: no rash  Psych: no depression  Endocrine: no polyuria, polydipsia  Hem/lymph: no swelling    ALL OTHER SYSTEMS REVIEWED AND NEGATIVE    PHYSICAL EXAM:  VITALS: T(C): 36.6 (12-08-21 @ 12:14)  T(F): 97.8 (12-08-21 @ 12:14), Max: 97.8 (12-08-21 @ 12:14)  HR: 75 (12-08-21 @ 12:14) (61 - 79)  BP: 110/63 (12-08-21 @ 12:14) (110/63 - 147/88)  RR:  (18 - 18)  SpO2:  (97% - 99%)  Wt(kg): --  GENERAL: NAD  EYES: No proptosis, no lid lag, anicteric  HEENT:  Atraumatic, Normocephalic, moist mucous membranes  RESPIRATORY: nonlabored respirations  PSYCH: Alert and oriented x 3, normal affect, normal mood      POCT Blood Glucose.: 208 mg/dL (12-08-21 @ 12:57)  POCT Blood Glucose.: 227 mg/dL (12-08-21 @ 08:32)  POCT Blood Glucose.: 231 mg/dL (12-08-21 @ 01:54)  POCT Blood Glucose.: 187 mg/dL (12-07-21 @ 21:51)  POCT Blood Glucose.: 138 mg/dL (12-07-21 @ 18:49)  POCT Blood Glucose.: 111 mg/dL (12-07-21 @ 17:32)  POCT Blood Glucose.: 162 mg/dL (12-07-21 @ 14:53)  POCT Blood Glucose.: 140 mg/dL (12-07-21 @ 14:09)  POCT Blood Glucose.: 348 mg/dL (12-07-21 @ 08:23)  POCT Blood Glucose.: 309 mg/dL (12-06-21 @ 21:33)  POCT Blood Glucose.: 210 mg/dL (12-06-21 @ 17:11)  POCT Blood Glucose.: 234 mg/dL (12-06-21 @ 13:19)      12-08    134<L>  |  103  |  86<H>  ----------------------------<  215<H>  4.5   |  17<L>  |  4.32<H>    EGFR if : 16<L>  EGFR if non : 14<L>    Ca    8.5      12-08  Mg     2.6     12-06  Phos  4.8     12-06    TPro  7.0  /  Alb  4.2  /  TBili  0.4  /  DBili  x   /  AST  14  /  ALT  17  /  AlkPhos  125<H>  12-06              
Patient has no complaints.    GENERAL: No fevers, no chills.  EYES: No blurry vision,  No photophobia  ENT: No sore throat.  No dysphagia  Cardiovascular: No chest pain, palpitations, orthopnea  Pulmonary: No cough, no wheezing. No shortness of breath  Gastrointestinal: No abdominal pain, no diarrhea, no constipation.   Musculoskeletal: No weakness.  No myalgias.  Dermatology:  No rashes.  Neuro: No Headache.  No vertigo.  No dizziness.  Psych: No anxiety, no depression.  Denies suicidal thoughts.    MEDICATIONS  (STANDING):  allopurinol 300 milliGRAM(s) Oral daily  aspirin enteric coated 81 milliGRAM(s) Oral daily  atorvastatin 80 milliGRAM(s) Oral at bedtime  carvedilol 25 milliGRAM(s) Oral every 12 hours  dextrose 40% Gel 15 Gram(s) Oral once  dextrose 5%. 1000 milliLiter(s) (50 mL/Hr) IV Continuous <Continuous>  dextrose 5%. 1000 milliLiter(s) (100 mL/Hr) IV Continuous <Continuous>  dextrose 50% Injectable 25 Gram(s) IV Push once  dextrose 50% Injectable 12.5 Gram(s) IV Push once  dextrose 50% Injectable 25 Gram(s) IV Push once  DULoxetine 30 milliGRAM(s) Oral daily  famotidine    Tablet 20 milliGRAM(s) Oral daily  glucagon  Injectable 1 milliGRAM(s) IntraMuscular once  heparin   Injectable 5000 Unit(s) SubCutaneous every 8 hours  influenza   Vaccine 0.5 milliLiter(s) IntraMuscular once  insulin glargine Injectable (LANTUS) 25 Unit(s) SubCutaneous at bedtime  insulin lispro (ADMELOG) corrective regimen sliding scale   SubCutaneous three times a day before meals  insulin lispro (ADMELOG) corrective regimen sliding scale   SubCutaneous at bedtime  insulin lispro Injectable (ADMELOG) 8 Unit(s) SubCutaneous three times a day before meals  tamsulosin 0.4 milliGRAM(s) Oral at bedtime  ticagrelor 90 milliGRAM(s) Oral every 12 hours    MEDICATIONS  (PRN):  acetaminophen     Tablet .. 650 milliGRAM(s) Oral every 6 hours PRN Temp greater or equal to 38C (100.4F), Mild Pain (1 - 3)    Vital Signs Last 24 Hrs  T(C): 36.5 (07 Dec 2021 12:55), Max: 36.7 (06 Dec 2021 19:20)  T(F): 97.7 (07 Dec 2021 12:55), Max: 98 (06 Dec 2021 19:20)  HR: 70 (07 Dec 2021 12:55) (70 - 94)  BP: 117/70 (07 Dec 2021 12:55) (101/70 - 120/75)  BP(mean): --  RR: 18 (07 Dec 2021 12:55) (17 - 18)  SpO2: 97% (07 Dec 2021 12:55) (97% - 100%)    GENERAL: NAD  HEAD:  Atraumatic, Normocephalic  EYES: EOMI, PERRLA, conjunctiva and sclera clear  ENT: Pharynx not erythematous  PULMONARY: Clear to auscultation bilaterally; No wheeze  CARDIOVASCULAR: Regular rate and rhythm; No murmurs, rubs, or gallops  ABDOMEN: Soft, Nontender, Nondistended; Bowel sounds present  EXTREMITIES:  2+ Peripheral Pulses, No clubbing, cyanosis, or edema  MUSCULOSKELETAL: No calf tenderness  PSYCH: AAOx3, normal affect   SKIN: warm and dry, No rashes or lesions    .  LABS:                         10.5   6.07  )-----------( 215      ( 07 Dec 2021 05:29 )             31.4     12-07    130<L>  |  98  |  85<H>  ----------------------------<  307<H>  4.3   |  17<L>  |  4.24<H>    Ca    8.5      07 Dec 2021 05:29  Phos  4.8     12-06  Mg     2.6     12-06    TPro  7.0  /  Alb  4.2  /  TBili  0.4  /  DBili  x   /  AST  14  /  ALT  17  /  AlkPhos  125<H>  12-06      Urinalysis Basic - ( 06 Dec 2021 20:12 )    Color: Light Yellow / Appearance: Slightly Turbid / S.010 / pH: x  Gluc: x / Ketone: Negative  / Bili: Negative / Urobili: Negative   Blood: x / Protein: 300 mg/dL / Nitrite: Negative   Leuk Esterase: Negative / RBC: 1 /hpf / WBC 4 /HPF   Sq Epi: x / Non Sq Epi: 5 /hpf / Bacteria: FEW        Lactate, Blood: 1.3 mmol/L ( @ 05:29)      RADIOLOGY, EKG & ADDITIONAL TESTS: Reviewed.

## 2021-12-14 ENCOUNTER — NON-APPOINTMENT (OUTPATIENT)
Age: 61
End: 2021-12-14

## 2021-12-17 ENCOUNTER — NON-APPOINTMENT (OUTPATIENT)
Age: 61
End: 2021-12-17

## 2021-12-21 ENCOUNTER — APPOINTMENT (OUTPATIENT)
Dept: ENDOCRINOLOGY | Facility: CLINIC | Age: 61
End: 2021-12-21
Payer: COMMERCIAL

## 2021-12-21 VITALS
DIASTOLIC BLOOD PRESSURE: 70 MMHG | WEIGHT: 180 LBS | SYSTOLIC BLOOD PRESSURE: 110 MMHG | OXYGEN SATURATION: 98 % | HEART RATE: 79 BPM | HEIGHT: 67 IN | BODY MASS INDEX: 28.25 KG/M2 | TEMPERATURE: 97.6 F

## 2021-12-21 PROCEDURE — 83036 HEMOGLOBIN GLYCOSYLATED A1C: CPT | Mod: QW

## 2021-12-21 PROCEDURE — 99215 OFFICE O/P EST HI 40 MIN: CPT | Mod: 25

## 2021-12-21 PROCEDURE — 82962 GLUCOSE BLOOD TEST: CPT | Mod: NC

## 2021-12-21 PROCEDURE — 95251 CONT GLUC MNTR ANALYSIS I&R: CPT

## 2021-12-21 NOTE — ASSESSMENT
[FreeTextEntry1] : Mr. ADAM KOWALSKI is a 59 year yo M here for evaluation and treatment of diabetes mellitus type II, poorly controlled with multiple microvascular and macrovascular complication. \par \par 1. Type 2 diabetes mellitus\par A1c poorly controlled\par Glucose significantly elevated postprandially due to dietary indiscretion\par Estimated A1c was 10.6% based on CGM data\par Recommend to continue with Basaglar 52 units at bedtime\par Recommend to increase Humalog to 15 units 3 times daily with meals\par He may be a candidate for GLP-1 receptor agonist but will need to repeat fasting lipid profile as well as getting clearance from his ophthalmologist given his history of diabetic retinopathy.\par Can consider SGLT2 based on renal function.\par Continue to follow-up with nephrologist.\par \par 2.  Hypertension\par BP today 110/70\par Continue to be managed by nephrologist.\par \par 3.  Hyperlipidemia\par Patient had high triglyceride level in the past\par Continue with fenofibrate renally dosed\par Continue with atorvastatin 80 mg once daily\par Repeat fasting lipid profile\par Discussed diet and exercise\par \par \par Diabetes Health Care Maintenance\par - Opthalmology up to date: Yes\par -Podiatry up to date: Yes\par -Antiplatelet agent: Yes\par  -Urine microalbumin: Up to date \par -ACE-I/ARB: Yes\par -Patient to call for persistent glucose < 70 or > 300\par -Discussed hypoglycemic protocol.  \par -Yearly flu vaccine recommended \par \par EDUCATION: Reviewed ‘ABCs’ of diabetes management (respective goals in parentheses): A1C (<7), blood pressure (<140/90), and cholesterol (LDL <100).\par \par COMPLIANCE at present is estimated to be: poor. \par FOLLOW UP: I recommend that frequency of visits for diabetes care be every 3 month \par \par \par

## 2021-12-21 NOTE — DATA REVIEWED
[FreeTextEntry1] : BMP 12/8/2021\par Sodium 134\par Potassium 4.5\par Chloride 103\par CO2 17\par Anion gap 14\par BUN 86\par Creatinine 4.32\par Glucose 250\par Calcium 8.5\par eGFR 14/16

## 2021-12-21 NOTE — HISTORY OF PRESENT ILLNESS
[FreeTextEntry1] : Patient is a 61-year-old man with history of type 2 diabetes, hypertension, CKD, CAD status post cardiac stents, hypertension, hyperlipidemia, here for endocrinology follow-up visit.\par \par Patient was last seen in clinic in November 2019.\par \par Patient was seen by me at the hospital last month secondary to an admission.\par \par Regarding type 2 diabetes, diagnosed more than 20+ years, complicated by diabetic retinopathy, neuropathy, and nephropathy, now pending AV fistula placement and potential dialysis.\par \par Diabetes also complicated by CAD status post cardiac stents. He has a CardioMEMS. No no history of CHF or CVA in the past. He follows up with Dr. Jonathan Torres  from [facility].\par \par For his diabetes regimen, currently taking Basaglar 52 units at bedtime, Humalog 11 units 3 times daily with meals.\par \par He is here with his wife today, who is a dialysis technician. Both patient and wife endorsed poor dietary habits. Patient lost his job last year, has been sitting at home and not doing much since then. Usually eats Chinese takeout for lunch and dinner. Patient endorsed that he likes carbohydrate and likes rice and noodles and think is part of his cultures to eat them all the time.\par \par Regarding hypertension, currently managed by his new nephrologist. He is pending AV fistula creation with vascular doctor in the next few weeks. He may be heading towards hemodialysis.\par \par Regarding hyperlipidemia patient is on statin therapy with atorvastatin 80 mg once daily as well as fenofibrate 48 mg once daily.\par \par Discharge medication\par Allopurinol 300 mg once daily\par Aspirin 81 once daily\par Atorvastatin 80 mg once daily\par Basaglar 50 units at bedtime\par Brilinta 90 mg twice daily\par Coreg 25 mg twice daily\par Duloxetine 30 mg once daily\par Gabapentin 300 mg twice daily\par Humalog 15 units 3 times daily with meals\par Protonix 40 mg once daily\par Tamoxifen 0.5 mg once daily\par

## 2021-12-21 NOTE — ADDENDUM
[FreeTextEntry1] : ==========\par CGM REPORT:\par ==========\par Patient underwent a CGM study from 12/8/2021–12/21/2021. 14 days of uninterrupted data were downloaded\par The reason for the study was uncontrolled type 2 diabetes\par Pt´s current therapeutic regimen includes: Basal bolus insulin as above\par The patient´s blood sugars were in target 13% of the time, above target 86% of the time and below target 1% of the time\par Average blood sugar was 303 mg/dL\par Glucose Managment Indicator (GMI) 10.6%\par Glucose variability 35.7%\par Defined as percent coefficient of variation (%CV); target <36%\par \par Hypoglycemia: The patient had 1 low glucose events, with average duration of 14  minutes. NOne of these were severe (<50mg/dL or requiring assistance). None hypoglycemic events typically \par \par Overnight trends appreciated were Rapid drop from overnight. \par Daytime trends appreciated were []\par \par Recommended therapeutic changes based on CGM Study:[]\par

## 2021-12-22 LAB
GLUCOSE BLDC GLUCOMTR-MCNC: 137
HBA1C MFR BLD HPLC: 11.7

## 2021-12-27 ENCOUNTER — NON-APPOINTMENT (OUTPATIENT)
Age: 61
End: 2021-12-27

## 2022-02-14 ENCOUNTER — APPOINTMENT (OUTPATIENT)
Dept: ENDOCRINOLOGY | Facility: CLINIC | Age: 62
End: 2022-02-14
Payer: COMMERCIAL

## 2022-02-14 VITALS
OXYGEN SATURATION: 98 % | SYSTOLIC BLOOD PRESSURE: 130 MMHG | TEMPERATURE: 97.5 F | WEIGHT: 187.6 LBS | HEART RATE: 90 BPM | DIASTOLIC BLOOD PRESSURE: 80 MMHG | BODY MASS INDEX: 29.38 KG/M2

## 2022-02-14 DIAGNOSIS — G62.9 POLYNEUROPATHY, UNSPECIFIED: ICD-10-CM

## 2022-02-14 PROCEDURE — 99214 OFFICE O/P EST MOD 30 MIN: CPT

## 2022-02-14 NOTE — THERAPY
[Today's Date] : [unfilled] [Basaglar] : Basaglar [Humalog] : Humalog [Insulin Sensitivity Factor = ____] : Insulin Sensitivity Factor = [unfilled] [FreeTextEntry9] : 55 units at bedtime [de-identified] : 15 units 3 times daily with meals

## 2022-02-14 NOTE — ASSESSMENT
[FreeTextEntry1] : Mr. ADAM KOWALSKI is a 61 year yo M here for evaluation and treatment of diabetes mellitus type II, poorly controlled with multiple microvascular and macrovascular complication. \par \par 1. Type 2 diabetes mellitus\par A1c poorly controlled\par Glucose significantly elevated postprandially due to dietary indiscretion\par Estimated A1c 11.4%.\par Patient's glucose control was much better while he was hospitalized with a diet controlled.\par He recognizes his issues.  He promised to work on it.\par Recommend to increase Basaglar from 55 units at bedtime to 60 units at bedtime\par Recommend to increase Humalog from 15 units 3 times daily with meals to 20 units 3 times daily with meals\par He may be a candidate for GLP-1 receptor agonist but will need to repeat fasting lipid profile as well as getting clearance from his ophthalmologist given his history of diabetic retinopathy.\par We will check fasting lipid profile to see if we can start him on GLP-1 receptor agonist.\par Can consider SGLT2 based on renal function.\par Continue to follow-up with nephrologist.\par Continue to follow-up ophthalmologist once yearly.  Should also ask ophthalmologist if he can be a candidate for GLP-1 receptor agonist.\par \par 2.  Hypertension\par BP today: 130/80\par Following up with nephrology,\par Approaching dialysis.  Having fistula creation March 2022.\par \par 3.  Hyperlipidemia\par Patient had high triglyceride level in the past\par We will repeat fasting lipid profile\par Continue with fenofibrate renally dosed\par Continue with atorvastatin 80 mg once daily\par Discussed diet and exercise\par \par CDE appointment in 2 months.\par Can schedule with the next available doctor if continues to have poorly controlled type 2 diabetes mellitus.\par \par \par EDUCATION: Reviewed ‘ABCs’ of diabetes management (respective goals in parentheses): A1C (<7), blood pressure (<140/90), and cholesterol (LDL <100).\par \par COMPLIANCE at present is estimated to be: poor. \par FOLLOW UP: I recommend that frequency of visits for diabetes care be every 3 month \par \par \par  [Diabetes Foot Care] : diabetes foot care [Carbohydrate Consistent Diet] : carbohydrate consistent diet [Long Term Vascular Complications] : long term vascular complications of diabetes [Exercise/Effect on Glucose] : exercise/effect on glucose [Importance of Diet and Exercise] : importance of diet and exercise to improve glycemic control, achieve weight loss and improve cardiovascular health [Hypoglycemia Management] : hypoglycemia management [Glucagon Use] : glucagon use [Ketone Testing] : ketone testing [Action and use of Insulin] : action and use of short and long-acting insulin [Self Monitoring of Blood Glucose] : self monitoring of blood glucose [Insulin Self-Administration] : insulin self-administration [Injection Technique, Storage, Sharps Disposal] : injection technique, storage, and sharps disposal [Sick-Day Management] : sick-day management [Retinopathy Screening] : Patient was referred to ophthalmology for retinopathy screening

## 2022-02-16 ENCOUNTER — NON-APPOINTMENT (OUTPATIENT)
Age: 62
End: 2022-02-16

## 2022-02-18 ENCOUNTER — NON-APPOINTMENT (OUTPATIENT)
Age: 62
End: 2022-02-18

## 2022-03-08 ENCOUNTER — NON-APPOINTMENT (OUTPATIENT)
Age: 62
End: 2022-03-08

## 2022-03-23 ENCOUNTER — APPOINTMENT (OUTPATIENT)
Dept: ENDOCRINOLOGY | Facility: CLINIC | Age: 62
End: 2022-03-23

## 2022-04-05 NOTE — ED ADULT NURSE NOTE - CAS EDN DISCHARGE ASSESSMENT
This is the third attempt to reach this pt in regards to scheduling with NO success. Letter sent. TE closed. Patient baseline mental status/Alert and oriented to person, place and time

## 2022-05-10 NOTE — ASU PATIENT PROFILE, ADULT - NS PREOP UNDERSTANDS INFO
Oncology Consultation    DIAGNOSIS:  Hx of carcinosarcoma    REQUESTING PHYSICIAN:  Dr Villalpando    HISTORY OF PRESENT ILLNESS:  70-year-old man seen in consultation for his prior history of carcinosarcoma.      The patient had a left lung mass characterized as carcinosarcoma and this was resected in 2/2014.  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  He has followed for a number years with Medical Oncology.  There was discussion regarding chemotherapy but he did not receive this and he has had very stable scans following with Dr. Gregory Street.    CT scans in December of 2021 questioned a new right liver mass:  IMPRESSION:      1. Questionable new at least 2.6 cm right liver lobe hypodense lesions not  fully evaluated in this noncontrast CT. Abdomen MRI with and without  contrast is recommended for further evaluation. Less optimal other choices  include noncontrast abdomen MRI or noncontrast liver ultrasound.    Patient went on to an ultrasound which confirmed an abnormality and finally a CT-guided liver biopsy in IR:    Addendum: IHC Prognostic Marker Result   Immunohistochemical stain for glutamine synthetase with performed.  Classic geographic staining indicative of focal nodular hyperplasia is not present. Diagnosis remains unchanged.    Addendum electronically signed by Polina Guevara MD on 2/18/2022 at 1156   Cytologic Interpretation   A.  Liver, needle core biopsy:  - Mild zone 3 pericellular and portal fibrosis with occasional fibrous septae (stage 2).  - Steatosis is not seen.  - Negative for malignancy.         He was evaluated by hepatology who on a thorough workup felt that there was no evidence of cirrhosis.  An MRI of the liver was obtained which again questioned a liver mass:  IMPRESSION:   1.  A heterogeneous, enhancing 4.5 cm lesion in the right hepatic lobe is  new from September 2017 and  consistent with a partially necrotic  metastasis. No additional liver lesions are evident.  2.  There is suspected fredy metastatic disease in the upper abdomen as  discussed above.    The patient had a PET scan and I saw him in March to review PET scan findings.  At that time the PET showed mild uptake within the right hepatic lobe mass and some abdominal lymph nodes.  I did review the PET scan with him and the lymphadenopathy had been noted going back multiple CT scans for at least a couple of years.  I did refer him to Dr. Mcdonald from Surgical Oncology who pursued another biopsy of the liver mass which unfortunately was consistent with malignancy  -  carcinosarcoma which was the same pathology as his prior cancer.  They did proceed with biopsy of the retroperitoneal lymph node which unfortunately also revealed carcinosarcoma.      The patient is here today accompanied by his wife.      He really feels quite well with no concerns in terms of abdominal pain or nausea.     Hypertension                                                  Dyslipidemia                                                  Ischemic heart disease                                        PAD (peripheral artery disease) (CMS/HCC)       11/2013       Atrial fibrillation (CMS/HCC)                                   Comment: On Sotolol and Coumadin    Thyroid tumor, benign                                         Hypothyroidism (acquired)                                     [place] (hard of hearing)                                           Comment: Left Hearing Aide    Full dentures                                                   Comment: Upper & Lower    Claudication (CMS/HCC)                          11/2013         Comment: > Left Leg    Squamous cell lung cancer (CMS/HCC)             02/2014         Comment: Left lower lobe    Bulging lumbar disc                                           Renal cancer (CMS/HCC)                                         Blood clot associated with vein wall inflammat*               Chronic pain                                                  Coronary artery disease                                       Myocardial infarction (CMS/HCC)                               High cholesterol                                              Pneumonia                                                       Comment: 12/2019    Gastroesophageal reflux disease                               Colon polyps                                    06/20/2018      Comment: Dr. Molina-repeat colonoscopy in 2 years    SOB (shortness of breath)                       05/2020       Numbness and tingling in both hands             05/2020       Wears glasses                                                 Wears hearing aid in left ear                                 Angina pectoris (CMS/HCC)                       4/3/2018        Comment: POST  OP  HEART  CATH           4/3/2018                  SARITA HERNANDEZ MD                                           RCA  +  RCA  VEIN  GRAFT   OLD  OCCLUSIONS                  NORMAL  ST  ANGIE  AVR  FLUOROSCOPY                            ECHO  4-2-2018  Sarita Hernandez MD   Left                ventricular ejection fraction, 55 %.  Normal                left ventricular cavity size. Mildly increased                left ventricular wall thickness.  Normal left                ventricu   ALLERGIES:  No Known Allergies   Family History   Problem Relation Age of Onset   • Hypertension Mother    • Emphysema Mother    • Heart disease Mother    • Diabetes Brother    • Heart disease Brother         Stents - heart, kidney, legs   • High blood pressure Brother    • Cancer Father         esophagus   • * Sister         s/p colectomy, Unsure if malignancy   • High blood pressure Sister    • High cholesterol Sister    • High blood pressure Brother       Social History     Tobacco Use   • Smoking status: Former Smoker     Packs/day: 3.50     Years:  40.00     Pack years: 140.00     Types: Cigarettes     Quit date: 2003     Years since quittin.3   • Smokeless tobacco: Never Used   Vaping Use   • Vaping Use: Not on file   Substance Use Topics   • Alcohol use: Yes     Alcohol/week: 14.0 standard drinks     Types: 14 Standard drinks or equivalent per week     Comment: occas 14 a week   • Drug use: No      Current Outpatient Medications   Medication   • ferrous sulfate 325 (65 FE) MG tablet   • potassium CHLORIDE (KLOR-CON M) 20 MEQ pedro ER tablet   • torsemide (DEMADEX) 20 MG tablet   • warfarin (COUMADIN) 2 MG tablet   • levothyroxine 100 MCG tablet   • metoPROLOL succinate (TOPROL-XL) 50 MG 24 hr tablet   • warfarin (COUMADIN) 1 MG tablet (warfarin)   • Omeprazole 20 MG Tablet Delayed Release Dispersible   • tamsulosin (FLOMAX) 0.4 MG Cap   • Cholecalciferol (VITAMIN D) 2000 units tablet   • atorvastatin (LIPITOR) 40 MG tablet   • Biotin 5000 MCG Cap   • aspirin 81 MG chewable tablet   • acetaminophen (TYLENOL) 500 MG tablet   • omega-3 acid ethyl esters (LOVAZA) 1 G capsule     No current facility-administered medications for this visit.        REVIEW OF SYSTEMS:  As above    Nursing notes are also reviewed and accepted.    PHYSICAL EXAMINATION:  Visit Vitals  /72 (BP Location: RUE - Right upper extremity, Patient Position: Sitting, Cuff Size: Regular)   Pulse (!) 108   Temp 98.6 °F (37 °C) (Oral)   Resp 20   Ht 5' 7\" (1.702 m)   Wt 94.4 kg (208 lb 1.8 oz)   SpO2 97%   BMI 32.60 kg/m²     Very pleasant gentleman in no acute distress      LABORATORY DATA:    Cytologic Interpretation   A: Lymph node, retroperitoneal, core biopsy:  - Positive for malignancy (See Comment).      2022    Cytologic Interpretation                                **FINAL DIAGNOSIS**     Liver, needle core biopsy:   -Metastatic carcinoma with morphology and immunophenotype compatible with origin from epithelioid component of the patient's known carcinosarcoma, please  see comment.       PD-L1 22C3 (KEYTRUDA) Non-Small Cell Lung Cancer Immunohistochemical Staining Results:   Block: A1 ([id number], A2)     NO EXPRESSION - Tumor Proportion Score (TPS): 0%  Intensity: 0    ONCO50 NGS Result Not Detected Detected Abnormal     Gene/Biomarker Table         Biomarker Therapeutic Significance   Gene/Biomarker FDA/NCCN - Therapies   IDH1 p.(R132C), Exon 4 See comments and URL references below for therapy recommendations.          Addendum: IHC Prognostic Marker Result   Immunohistochemical stain for glutamine synthetase with performed.  Classic geographic staining indicative of focal nodular hyperplasia is not present. Diagnosis remains unchanged.    Addendum electronically signed by Polina Guevara MD on 2/18/2022 at 1156   Cytologic Interpretation   A.  Liver, needle core biopsy:  - Mild zone 3 pericellular and portal fibrosis with occasional fibrous septae (stage 2).  - Steatosis is not seen.  - Negative for malignancy     PET    IMPRESSION:      1. Mild uptake within the right hepatic lobe mass, as above.  Not further  characterized.     2.  There are some upper abdominal lymph nodes that are slightly  hypermetabolic relative to blood pool activity.  Can't exclude metastases.   Reactive lymph nodes are also possible.     3.  Focal hypermetabolism within the sigmoid colon.  There is colonic  diverticulosis.  No gross inflammatory change within the sigmoid.  Could  still be inflammatory, secondary to neoplasm or physiologic.  Evaluation  with colonoscopy may be considered.     4.  Additional ancillary findings as above.    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  His last evidence of known disease was 2014.     I discussed the case at length with the patient and his wife.  Unfortunately he clearly has evidence of progression of his prior disease.  I did review that he had stage IV disease at  diagnosis in 2014 and while he was resected to no evidence of disease he clearly now has evidence of recurrence.  It is certainly unusual considering our typical thought that carcinosarcoma is an aggressive process the fact that he has had no evidence of disease for 8 years.  It is also interesting that he has had retroperitoneal lymphadenopathy on scans going back a number of years which does not seem to have progressed that quickly but is clearly malignant.      The patient and his wife were somewhat concerned that local therapies are not being offered but I discussed that he already has lymph node involvement so it would not make sense to give him liver directed therapy with evidence of regional lymphadenopathy and not treat systemically.  I recommend a regimen of carboplatin paclitaxel and pembrolizumab.  The patient has great concerns based on his other medical issues such as AFib and peripheral arterial disease and I told him that certainly these are taken into account with our treatment and supportive care plan.      Unfortunately this would not be curative therapy but would have a goal of slowing down the pace of his disease.  What is clearly difficult is trying to give him some sense of how much time we will give him considering has slowly this has  grown.  Patient is rather reluctant and I told him we could certainly try chemotherapy immunotherapy and if at any point he feels that is not with in his best interest anymore we could stop it he and wife want to consider their options.    Stephanie Ziegler MD       yes

## 2022-07-19 NOTE — ED ADULT NURSE NOTE - NS ED NURSE DC PT EDUCATION RESOURCES
General Discharge Instructions for Children   If your child was prescribed antibiotics, please take them to completion.  You must understand that you've received an Urgent Care treatment only and that you may be released before all your medical problems are known or treated. You, the parent  will arrange for follow up care as instructed.  Follow up with your child's pediatrician as directed in the next 1-2 days if not improved or as needed.  If your condition worsens we recommend that you receive another evaluation at the emergency room immediately or contact your pediatrician clinics after hours call service to discuss your concerns.  Please go to the Emergency Department for any concerns or worsening of condition.     Yes

## 2022-08-14 NOTE — PROGRESS NOTE ADULT - SUBJECTIVE AND OBJECTIVE BOX
FEVER & PAIN RELIEVER DOSING CHART for CHILDREN       IBUPROFEN (MOTRIN or ADVIL) DOSING (every 6-8 hours)   WEIGHT in   POUNDS  AGE  INFANT DROPS   50 MG/1.25 ml  SUSPENSION   100 mg/5 ml  CHEWABLE   50 mg  CHEWABLE   100 mg     Under 6 m  Do not use under 6 m  Do not use <6m  Do not use  Do not use    12 - 17 lbs  6 - 11 mo  1.25 ml (1 dropper)  2.5 ml      18 - 23 lbs  1 - 2 yr  1.875 ml (1.5 droppers)  3.75 ml      24 - 35 lbs  2 - 3 yr  2.5 ml (2 droppers)  5 ml  2 tabs  1 tab    36 - 47 lbs  4 - 5 yr   7.5 ml  3 tabs  1.5 tabs    48 - 59 lbs  6 - 8 yr   10 ml  4 tabs  2 tabs    60 - 71 lbs  9 - 10 yr   12.5 ml  5 tabs  2.5 tabs    72 - 95 lbs  11+ yr   15 ml  6 tabs  3 tabs    ACETAMINOPHEN (TYLENOL) DOSING (every 4-6 hours)   WEIGHT in   POUNDS  AGE  INFANT DROPS   80 mg/0.8 ml  SUSPENSION   160 mg/5 ml  CHEWABLE   80 mg  CHEWABLE   160 mg    6 -11 lbs  < 4 mo  0.4ml (1/2 dropper)       12 - 17 lbs  4 - 11 mo  0.8ml (1 dropper)  2.5 ml      18 - 23 lbs  1 - 2 yr  1.2ml (1.5 droppers)  3.75 ml      24 - 35 lbs  2 - 3 yr  1.6ml (2 droppers)  5 ml  2 tabs  1 tab    36 - 47 lbs  4 - 5 yr   7.5 ml  3 tabs  1.5 tabs    48 - 59 lbs  6 - 8 yr   10 ml  4 tabs  2 tabs    60 - 71 lbs  9 - 10 yr   12.5 ml  5 tabs  2.5 tabs    72 - 95 lbs  11 - 12 yr   15 ml  6 tabs  3 tabs    > 95 lbs  13 + yr     4 tabs    It is ok to either ALTERNATE acetaminophen and ibuprofen every 4 hours   OR   Give BOTH together every 6 hours   If you give both at the same time, you must wait 6 hours before starting to alternate again       Patient is a 58y old  Male who presents with a chief complaint of sharp chest pain x 2 days    SUBJECTIVE / OVERNIGHT EVENTS:    R sided CP on palpation has not resolved, no pain  cough is better  no dysuria, no nausea/vomiting, no diarrhea, had normal BM yest, brown  foot pain is much improved/resolved, can now ambulate better     Tele: no events     MEDICATIONS  (STANDING):  allopurinol 400 milliGRAM(s) Oral daily  aspirin enteric coated 81 milliGRAM(s) Oral daily  atorvastatin 80 milliGRAM(s) Oral at bedtime  carvedilol 12.5 milliGRAM(s) Oral every 12 hours  colchicine 0.6 milliGRAM(s) Oral every 48 hours  dextrose 50% Injectable 12.5 Gram(s) IV Push once  dextrose 50% Injectable 25 Gram(s) IV Push once  dextrose 50% Injectable 25 Gram(s) IV Push once  furosemide    Tablet 40 milliGRAM(s) Oral daily  gabapentin 300 milliGRAM(s) Oral three times a day  hydrALAZINE 75 milliGRAM(s) Oral three times a day  insulin glargine Injectable (LANTUS) 50 Unit(s) SubCutaneous at bedtime  insulin lispro (HumaLOG) corrective regimen sliding scale   SubCutaneous three times a day before meals  insulin lispro (HumaLOG) corrective regimen sliding scale   SubCutaneous at bedtime  insulin lispro Injectable (HumaLOG) 10 Unit(s) SubCutaneous three times a day before meals  isosorbide   dinitrate Tablet (ISORDIL) 20 milliGRAM(s) Oral three times a day  loratadine 10 milliGRAM(s) Oral daily  pantoprazole    Tablet 40 milliGRAM(s) Oral before breakfast  predniSONE   Tablet 10 milliGRAM(s) Oral daily  tamsulosin 0.4 milliGRAM(s) Oral at bedtime  ticagrelor 90 milliGRAM(s) Oral two times a day    MEDICATIONS  (PRN):  acetaminophen   Tablet 650 milliGRAM(s) Oral every 6 hours PRN For Temp greater than 38 C (100.4 F)  dextrose 40% Gel 15 Gram(s) Oral once PRN Blood Glucose LESS THAN 70 milliGRAM(s)/deciliter  glucagon  Injectable 1 milliGRAM(s) IntraMuscular once PRN Glucose LESS THAN 70 milligrams/deciliter  ondansetron Injectable 4 milliGRAM(s) IV Push every 4 hours PRN Nausea and/or Vomiting  traMADol 50 milliGRAM(s) Oral two times a day PRN Moderate Pain (4 - 6) and severe (7-10)    T(C): 36.7 (07-28-18 @ 06:38), Max: 37.1 (07-27-18 @ 21:42)  HR: 97 (07-28-18 @ 06:38) (82 - 97)  BP: 137/70 (07-28-18 @ 06:38) (113/50 - 151/82)  RR: 17 (07-28-18 @ 06:38) (17 - 18)  SpO2: 100% (07-28-18 @ 06:38) (95% - 100%)    CAPILLARY BLOOD GLUCOSE  POCT Blood Glucose.: 251 mg/dL (28 Jul 2018 08:38)  POCT Blood Glucose.: 276 mg/dL (27 Jul 2018 22:22)  POCT Blood Glucose.: 215 mg/dL (27 Jul 2018 17:06)  POCT Blood Glucose.: 192 mg/dL (27 Jul 2018 13:05)  POCT Blood Glucose.: 185 mg/dL (27 Jul 2018 11:52)    I&O's Summary    27 Jul 2018 07:01  -  28 Jul 2018 07:00  --------------------------------------------------------  IN: 200 mL / OUT: 850 mL / NET: -650 mL    PHYSICAL EXAM:  GENERAL: NAD laying in bed  HEAD:  Atraumatic, Normocephalic  EYES: EOMI, PERRLA, conjunctiva and sclera clear  NECK: Supple, neck appears sanches on R>L  CHEST/LUNG: Clear to auscultation bilaterally; No wheeze; TTP R superior chest wall/ribs  HEART: Regular rate and rhythm; No murmurs, rubs, or gallops  ABDOMEN: Soft, Nontender, mildly distended; Bowel sounds present  EXTREMITIES:   warm and well perfused, No clubbing, cyanosis, or edema; no pain erythema of wrist/knee/elbow/ankle; R foot pain on plantar surface lateral to arch now nontender to manipulation, no joint tenderness or warmth of foot, L hand middle finger PIP swelling  PSYCH: AAOx3  NEUROLOGY: non-focal  SKIN: No rashes or lesions    LABS:                        9.0    7.69  )-----------( 249      ( 28 Jul 2018 05:30 )             27.1     07-28    135  |  96<L>  |  52<H>  ----------------------------<  232<H>  4.1   |  23  |  2.11<H>    Ca    9.1      28 Jul 2018 05:30      TTE reviewed, uchanged from prior, EF preserved     1. Mild segmental left ventricular systolic dysfunction.  Hypokinesis of apex, distal septum and distal inferior  wall. Global LV function is perserved.  2. Mild diastolic dysfunction (Stage I).  3. Normal right ventricular size and function.    Consultant(s) Notes Reviewed:  rheum, podiatry, endo, cards  Care Discussed with Consultants/Other Providers:

## 2022-08-22 ENCOUNTER — APPOINTMENT (OUTPATIENT)
Dept: ENDOCRINOLOGY | Facility: CLINIC | Age: 62
End: 2022-08-22

## 2022-08-22 VITALS
HEART RATE: 101 BPM | DIASTOLIC BLOOD PRESSURE: 80 MMHG | BODY MASS INDEX: 29.76 KG/M2 | SYSTOLIC BLOOD PRESSURE: 130 MMHG | OXYGEN SATURATION: 96 % | WEIGHT: 190 LBS | TEMPERATURE: 97.5 F

## 2022-08-22 LAB — HBA1C MFR BLD HPLC: 12.9

## 2022-08-22 PROCEDURE — 99214 OFFICE O/P EST MOD 30 MIN: CPT | Mod: 25

## 2022-08-22 PROCEDURE — 83036 HEMOGLOBIN GLYCOSYLATED A1C: CPT | Mod: QW

## 2022-08-22 NOTE — DATA REVIEWED
[FreeTextEntry1] : 1/31/2022\par : Cholesterol 250\par Triglyceride 330\par HDL 34\par VLDL 62\par \par Hemoglobin A1c 10.3%\par TSH 4.42\par Vitamin D32.3\par Glucose 222\par BUN 48\par Creatinine 3.07\par eGFR 21\par Sodium 140\par Potassium 3.8\par Chloride 101\par CO2 25\par Calcium 8.6\par Total protein 5.7

## 2022-08-22 NOTE — ASSESSMENT
[FreeTextEntry1] : Mr. ADAM KOWALSKI is a 61 year yo M here for evaluation and treatment of diabetes mellitus type II, poorly controlled with multiple microvascular and macrovascular complication. \par \par 1. Type 2 diabetes mellitus\par A1c poorly controlled, has Homer 2 sensor but not scanning regularly. \par A1c 12.9%\par Glucose significantly elevated postprandially due to dietary indiscretion\par Patient's glucose control was much better while he was hospitalized with a diet controlled.\par \par Recommend Basaglar 50 units at bedtime\par Recommend Humalog 18 units 3 times daily with meals\par Recommend moderate dose sliding scale 2: 50 above 150 mg/dL\par Recommend to start Trulicity 0.75 mg once weekly\par Had clearance from his ophthalmologist.  Triglyceride was in 300s.\par \par Discussed risks of the thyroid C-cell tumor, pancreatitis, hypoglycemia, hypersensitivity reactions, acute kidney injury, severe gastrointestinal complications, discussed that the most common adverse infections included nausea, diarrhea, vomiting, and abdominal pain.  We discussed that gastric emptying slow by GLP-1 receptor agonist.  \par Diabetic retinoapthy complications have been reported in a cardiovascular outcomes trial. Should have semi-annual eye exam and close follow up with his/her ophthalmologist.\par \par Can consider SGLT2 based on renal function.\par Continue to follow-up with nephrologist.\par Continue to follow-up ophthalmologist once yearly.  Should also ask ophthalmologist if he can be a candidate for GLP-1 receptor agonist.\par \par 2.  Hypertension\par Blood pressure goal <130/80\par Currently at goal\par Following up with nephrology,\par Approaching dialysis.  Having fistula creation March 2022.\par \par 3.  Hyperlipidemia\par Patient had high triglyceride level in the past\par We will repeat fasting lipid profile\par Continue with fenofibrate renally dosed\par Continue with atorvastatin 80 mg once daily\par Discussed diet and exercise\par \par Follow-up in 3 to 4 months\par \par \par EDUCATION: Reviewed ‘ABCs’ of diabetes management (respective goals in parentheses): A1C (<7), blood pressure (<140/90), and cholesterol (LDL <100).\par \par COMPLIANCE at present is estimated to be: poor. \par FOLLOW UP: I recommend that frequency of visits for diabetes care be every 3 month \par \par \par

## 2022-08-22 NOTE — HISTORY OF PRESENT ILLNESS
[FreeTextEntry1] : Patient is a 62-year-old man with history of type 2 diabetes, hypertension, CKD, CAD status post cardiac stents, hypertension, hyperlipidemia, here for endocrinology follow-up visit.\par \par Regarding type 2 diabetes, diagnosed more than 20+ years, complicated by diabetic retinopathy, neuropathy, and nephropathy, now pending AV fistula placement and potential dialysis.\par \par Diabetes also complicated by CAD status post cardiac stents. He has a CardioMEMS. No no history of CHF or CVA in the past. He follows up with Dr. Jonathan Torres  from [facility].\par \par Patient on Basaglar 40 units at bedtime, Humalog 15 units 3 times daily with meals with MODERATE DOES sliding scale.,  However he was prescribed Basaglar 60 units at bedtime, Humalog 15 units with small meals and 20 units with big meals.\par \par He was also prescribed Trulicity 0.75 in February 2022 after consultation with his ophthalmologist.  However patient stated that he never picked up the medication.\par \par Diet continue to be poorly controlled.  Eating large amount of carbohydrate with each meals.  Wife also states that he is snacking on chips, cookies, fruits.  Patient with history of depression.  Was late fall from his job.  Usually sits at home all day watching TV.\par \par He is here with his wife today, who is a dialysis technician. Both patient and wife endorsed poor dietary habits. Patient lost his job last year, has been sitting at home and not doing much since then. Usually eats Chinese takeout for lunch and dinner. Patient endorsed that he likes carbohydrate and likes rice and noodles and think is part of his cultures to eat them all the time.\par \par Regarding hypertension, currently managed by his new nephrologist. He is pending AV fistula creation with vascular doctor in the next few weeks. He may be heading towards hemodialysis.\par \par Regarding hyperlipidemia patient is on statin therapy with atorvastatin 80 mg once daily as well as fenofibrate 48 mg once daily.\par

## 2022-08-22 NOTE — PHYSICAL EXAM
[Alert] : alert [Well Nourished] : well nourished [Obese] : obese [No Acute Distress] : no acute distress [Well Developed] : well developed [Normal Sclera/Conjunctiva] : normal sclera/conjunctiva [EOMI] : extra ocular movement intact [No Proptosis] : no proptosis [Normal Oropharynx] : the oropharynx was normal [Thyroid Not Enlarged] : the thyroid was not enlarged [No Thyroid Nodules] : no palpable thyroid nodules [No Respiratory Distress] : no respiratory distress [No Accessory Muscle Use] : no accessory muscle use [Clear to Auscultation] : lungs were clear to auscultation bilaterally [Normal S1, S2] : normal S1 and S2 [Normal Rate] : heart rate was normal [Regular Rhythm] : with a regular rhythm [No Edema] : no peripheral edema [Pedal Pulses Normal] : the pedal pulses are present [Normal Bowel Sounds] : normal bowel sounds [Not Tender] : non-tender [Not Distended] : not distended [Soft] : abdomen soft [Normal Anterior Cervical Nodes] : no anterior cervical lymphadenopathy [No Spinal Tenderness] : no spinal tenderness [Spine Straight] : spine straight [No Stigmata of Cushings Syndrome] : no stigmata of Cushings Syndrome [Normal Gait] : normal gait [Normal Strength/Tone] : muscle strength and tone were normal [No Rash] : no rash [Acanthosis Nigricans] : no acanthosis nigricans [Normal] : normal [Full ROM] : with full range of motion [Diminished Throughout Both Feet] : normal tactile sensation with monofilament testing throughout both feet [Normal Reflexes] : deep tendon reflexes were 2+ and symmetric [No Tremors] : no tremors [Oriented x3] : oriented to person, place, and time

## 2022-10-07 NOTE — ASU PREOP CHECKLIST - BP NONINVASIVE DIASTOLIC (MM HG)
Specialty Pharmacy - Refill Coordination    Specialty Medication Orders Linked to Encounter      Flowsheet Row Most Recent Value   Medication #1 entecavir (BARACLUDE) 0.5 MG Tab (Order#[id number], Rx#[id number])            Refill Questions - Documented Responses      Flowsheet Row Most Recent Value   Patient Availability and HIPAA Verification    Does patient want to proceed with activity? Yes   HIPAA/medical authority confirmed? Yes   Relationship to patient of person spoken to? Self   Refill Screening Questions    Changes to allergies? No   Changes to medications? No   New conditions since last clinic visit? No   Unplanned office visit, urgent care, ED, or hospital admission in the last 4 weeks? No   How does patient/caregiver feel medication is working? Good   Financial problems or insurance changes? No   How many doses of your specialty medications were missed in the last 4 weeks? 0   Would patient like to speak to a pharmacist? No   When does the patient need to receive the medication? 10/12/22   Refill Delivery Questions    How will the patient receive the medication? MED[facility]   When does the patient need to receive the medication? 10/12/22   Shipping Address Prescription   Address in [facility] confirmed and updated if neccessary? Yes   Expected Copay ($) 4.44   Is the patient able to afford the medication copay? Yes   Payment Method CC on file   Days supply of Refill 30   Supplies needed? No supplies needed   Refill activity completed? Yes   Refill activity plan Refill scheduled   Shipment/Pickup Date: 10/11/22            Current Outpatient Medications   Medication Sig    entecavir (BARACLUDE) 0.5 MG Tab Take 1 tablet (0.5 mg total) by mouth once daily.   Last reviewed on 4/25/2022  2:13 PM by Winifred Sorenson NP    Review of patient's allergies indicates:  No Known Allergies Last reviewed on  4/25/2022 2:13 PM by Winifred Sorenson      Tasks added this encounter   11/4/2022 - Refill Call (Auto Added)    Tasks due within next 3 months   No tasks due.     [facility] - Specialty Pharmacy  [address]  [facility] [address]  Phone: [phone]  Fax: [phone]         78

## 2022-10-22 ENCOUNTER — INPATIENT (INPATIENT)
Facility: HOSPITAL | Age: 62
LOS: 22 days | Discharge: NURSING, REHAB OR LONG-TERM CARE | DRG: 682 | End: 2022-11-14
Attending: INTERNAL MEDICINE | Admitting: INTERNAL MEDICINE
Payer: COMMERCIAL

## 2022-10-22 VITALS
TEMPERATURE: 99 F | DIASTOLIC BLOOD PRESSURE: 66 MMHG | WEIGHT: 199.96 LBS | HEART RATE: 110 BPM | RESPIRATION RATE: 16 BRPM | SYSTOLIC BLOOD PRESSURE: 152 MMHG | HEIGHT: 67 IN | OXYGEN SATURATION: 94 %

## 2022-10-22 DIAGNOSIS — Z98.89 OTHER SPECIFIED POSTPROCEDURAL STATES: Chronic | ICD-10-CM

## 2022-10-22 DIAGNOSIS — Z95.5 PRESENCE OF CORONARY ANGIOPLASTY IMPLANT AND GRAFT: Chronic | ICD-10-CM

## 2022-10-22 DIAGNOSIS — Z82.49 FAMILY HISTORY OF ISCHEMIC HEART DISEASE AND OTHER DISEASES OF THE CIRCULATORY SYSTEM: ICD-10-CM

## 2022-10-22 DIAGNOSIS — I50.9 HEART FAILURE, UNSPECIFIED: Chronic | ICD-10-CM

## 2022-10-22 DIAGNOSIS — E11.319 TYPE 2 DIABETES MELLITUS WITH UNSPECIFIED DIABETIC RETINOPATHY WITHOUT MACULAR EDEMA: Chronic | ICD-10-CM

## 2022-10-22 LAB
ALBUMIN SERPL ELPH-MCNC: 3.1 G/DL — LOW (ref 3.3–5)
ALP SERPL-CCNC: 214 U/L — HIGH (ref 40–120)
ALT FLD-CCNC: 17 U/L — SIGNIFICANT CHANGE UP (ref 10–45)
ANION GAP SERPL CALC-SCNC: 20 MMOL/L — HIGH (ref 5–17)
APPEARANCE UR: CLEAR — SIGNIFICANT CHANGE UP
APTT BLD: 33 SEC — SIGNIFICANT CHANGE UP (ref 27.5–35.5)
AST SERPL-CCNC: 22 U/L — SIGNIFICANT CHANGE UP (ref 10–40)
BACTERIA # UR AUTO: NEGATIVE — SIGNIFICANT CHANGE UP
BASE EXCESS BLDV CALC-SCNC: -0.9 MMOL/L — SIGNIFICANT CHANGE UP (ref -2–3)
BASOPHILS # BLD AUTO: 0.03 K/UL — SIGNIFICANT CHANGE UP (ref 0–0.2)
BASOPHILS NFR BLD AUTO: 0.2 % — SIGNIFICANT CHANGE UP (ref 0–2)
BILIRUB SERPL-MCNC: 0.4 MG/DL — SIGNIFICANT CHANGE UP (ref 0.2–1.2)
BILIRUB UR-MCNC: NEGATIVE — SIGNIFICANT CHANGE UP
BUN SERPL-MCNC: 74 MG/DL — HIGH (ref 7–23)
CA-I SERPL-SCNC: 1.01 MMOL/L — LOW (ref 1.15–1.33)
CALCIUM SERPL-MCNC: 9.3 MG/DL — SIGNIFICANT CHANGE UP (ref 8.4–10.5)
CHLORIDE BLDV-SCNC: 94 MMOL/L — LOW (ref 96–108)
CHLORIDE SERPL-SCNC: 91 MMOL/L — LOW (ref 96–108)
CO2 BLDV-SCNC: 25 MMOL/L — SIGNIFICANT CHANGE UP (ref 22–26)
CO2 SERPL-SCNC: 22 MMOL/L — SIGNIFICANT CHANGE UP (ref 22–31)
COLOR SPEC: SIGNIFICANT CHANGE UP
CREAT SERPL-MCNC: 5.43 MG/DL — HIGH (ref 0.5–1.3)
DIFF PNL FLD: ABNORMAL
EGFR: 11 ML/MIN/1.73M2 — LOW
EOSINOPHIL # BLD AUTO: 0.09 K/UL — SIGNIFICANT CHANGE UP (ref 0–0.5)
EOSINOPHIL NFR BLD AUTO: 0.7 % — SIGNIFICANT CHANGE UP (ref 0–6)
EPI CELLS # UR: 5 /HPF — SIGNIFICANT CHANGE UP
GAS PNL BLDV: 128 MMOL/L — LOW (ref 136–145)
GAS PNL BLDV: SIGNIFICANT CHANGE UP
GLUCOSE BLDC GLUCOMTR-MCNC: 375 MG/DL — HIGH (ref 70–99)
GLUCOSE BLDV-MCNC: 286 MG/DL — HIGH (ref 70–99)
GLUCOSE SERPL-MCNC: 282 MG/DL — HIGH (ref 70–99)
GLUCOSE UR QL: ABNORMAL
HCO3 BLDV-SCNC: 24 MMOL/L — SIGNIFICANT CHANGE UP (ref 22–29)
HCT VFR BLD CALC: 34.6 % — LOW (ref 39–50)
HCT VFR BLDA CALC: 34 % — LOW (ref 39–51)
HGB BLD CALC-MCNC: 11.3 G/DL — LOW (ref 12.6–17.4)
HGB BLD-MCNC: 11 G/DL — LOW (ref 13–17)
HYALINE CASTS # UR AUTO: 1 /LPF — SIGNIFICANT CHANGE UP (ref 0–7)
IMM GRANULOCYTES NFR BLD AUTO: 0.4 % — SIGNIFICANT CHANGE UP (ref 0–0.9)
INR BLD: 1.11 RATIO — SIGNIFICANT CHANGE UP (ref 0.88–1.16)
KETONES UR-MCNC: SIGNIFICANT CHANGE UP
LACTATE BLDV-MCNC: 1.7 MMOL/L — SIGNIFICANT CHANGE UP (ref 0.5–2)
LEUKOCYTE ESTERASE UR-ACNC: NEGATIVE — SIGNIFICANT CHANGE UP
LYMPHOCYTES # BLD AUTO: 1.9 K/UL — SIGNIFICANT CHANGE UP (ref 1–3.3)
LYMPHOCYTES # BLD AUTO: 15.8 % — SIGNIFICANT CHANGE UP (ref 13–44)
MCHC RBC-ENTMCNC: 28.7 PG — SIGNIFICANT CHANGE UP (ref 27–34)
MCHC RBC-ENTMCNC: 31.8 GM/DL — LOW (ref 32–36)
MCV RBC AUTO: 90.3 FL — SIGNIFICANT CHANGE UP (ref 80–100)
MONOCYTES # BLD AUTO: 1.32 K/UL — HIGH (ref 0–0.9)
MONOCYTES NFR BLD AUTO: 11 % — SIGNIFICANT CHANGE UP (ref 2–14)
NEUTROPHILS # BLD AUTO: 8.62 K/UL — HIGH (ref 1.8–7.4)
NEUTROPHILS NFR BLD AUTO: 71.9 % — SIGNIFICANT CHANGE UP (ref 43–77)
NITRITE UR-MCNC: NEGATIVE — SIGNIFICANT CHANGE UP
NRBC # BLD: 0 /100 WBCS — SIGNIFICANT CHANGE UP (ref 0–0)
PCO2 BLDV: 37 MMHG — LOW (ref 42–55)
PH BLDV: 7.41 — SIGNIFICANT CHANGE UP (ref 7.32–7.43)
PH UR: 6.5 — SIGNIFICANT CHANGE UP (ref 5–8)
PLATELET # BLD AUTO: 325 K/UL — SIGNIFICANT CHANGE UP (ref 150–400)
PO2 BLDV: 57 MMHG — HIGH (ref 25–45)
POTASSIUM BLDV-SCNC: 5.9 MMOL/L — HIGH (ref 3.5–5.1)
POTASSIUM SERPL-MCNC: 4.8 MMOL/L — SIGNIFICANT CHANGE UP (ref 3.5–5.3)
POTASSIUM SERPL-SCNC: 4.8 MMOL/L — SIGNIFICANT CHANGE UP (ref 3.5–5.3)
PROT SERPL-MCNC: 7.5 G/DL — SIGNIFICANT CHANGE UP (ref 6–8.3)
PROT UR-MCNC: ABNORMAL
PROTHROM AB SERPL-ACNC: 12.8 SEC — SIGNIFICANT CHANGE UP (ref 10.5–13.4)
RAPID RVP RESULT: SIGNIFICANT CHANGE UP
RBC # BLD: 3.83 M/UL — LOW (ref 4.2–5.8)
RBC # FLD: 14.5 % — SIGNIFICANT CHANGE UP (ref 10.3–14.5)
RBC CASTS # UR COMP ASSIST: 11 /HPF — HIGH (ref 0–4)
SAO2 % BLDV: 87.4 % — SIGNIFICANT CHANGE UP (ref 67–88)
SARS-COV-2 RNA SPEC QL NAA+PROBE: SIGNIFICANT CHANGE UP
SODIUM SERPL-SCNC: 133 MMOL/L — LOW (ref 135–145)
SP GR SPEC: 1.01 — SIGNIFICANT CHANGE UP (ref 1.01–1.02)
UROBILINOGEN FLD QL: NEGATIVE — SIGNIFICANT CHANGE UP
WBC # BLD: 12.01 K/UL — HIGH (ref 3.8–10.5)
WBC # FLD AUTO: 12.01 K/UL — HIGH (ref 3.8–10.5)
WBC UR QL: 6 /HPF — HIGH (ref 0–5)

## 2022-10-22 PROCEDURE — 74176 CT ABD & PELVIS W/O CONTRAST: CPT | Mod: 26,MA

## 2022-10-22 PROCEDURE — 71250 CT THORAX DX C-: CPT | Mod: 26,MA

## 2022-10-22 PROCEDURE — 93010 ELECTROCARDIOGRAM REPORT: CPT

## 2022-10-22 PROCEDURE — 99285 EMERGENCY DEPT VISIT HI MDM: CPT

## 2022-10-22 PROCEDURE — 71045 X-RAY EXAM CHEST 1 VIEW: CPT | Mod: 26

## 2022-10-22 RX ORDER — SODIUM CHLORIDE 9 MG/ML
1000 INJECTION, SOLUTION INTRAVENOUS ONCE
Refills: 0 | Status: COMPLETED | OUTPATIENT
Start: 2022-10-22 | End: 2022-10-22

## 2022-10-22 RX ORDER — PIPERACILLIN AND TAZOBACTAM 4; .5 G/20ML; G/20ML
3.38 INJECTION, POWDER, LYOPHILIZED, FOR SOLUTION INTRAVENOUS EVERY 12 HOURS
Refills: 0 | Status: DISCONTINUED | OUTPATIENT
Start: 2022-10-22 | End: 2022-10-23

## 2022-10-22 RX ORDER — HEPARIN SODIUM 5000 [USP'U]/ML
5000 INJECTION INTRAVENOUS; SUBCUTANEOUS EVERY 12 HOURS
Refills: 0 | Status: DISCONTINUED | OUTPATIENT
Start: 2022-10-22 | End: 2022-11-14

## 2022-10-22 RX ORDER — ATORVASTATIN CALCIUM 80 MG/1
80 TABLET, FILM COATED ORAL AT BEDTIME
Refills: 0 | Status: DISCONTINUED | OUTPATIENT
Start: 2022-10-22 | End: 2022-11-14

## 2022-10-22 RX ORDER — INSULIN GLARGINE 100 [IU]/ML
10 INJECTION, SOLUTION SUBCUTANEOUS AT BEDTIME
Refills: 0 | Status: DISCONTINUED | OUTPATIENT
Start: 2022-10-22 | End: 2022-10-23

## 2022-10-22 RX ORDER — PIPERACILLIN AND TAZOBACTAM 4; .5 G/20ML; G/20ML
3.38 INJECTION, POWDER, LYOPHILIZED, FOR SOLUTION INTRAVENOUS ONCE
Refills: 0 | Status: COMPLETED | OUTPATIENT
Start: 2022-10-22 | End: 2022-10-22

## 2022-10-22 RX ORDER — INSULIN LISPRO 100/ML
2 VIAL (ML) SUBCUTANEOUS
Refills: 0 | Status: DISCONTINUED | OUTPATIENT
Start: 2022-10-22 | End: 2022-10-23

## 2022-10-22 RX ORDER — ASPIRIN/CALCIUM CARB/MAGNESIUM 324 MG
81 TABLET ORAL DAILY
Refills: 0 | Status: DISCONTINUED | OUTPATIENT
Start: 2022-10-22 | End: 2022-11-14

## 2022-10-22 RX ORDER — DEXTROSE 50 % IN WATER 50 %
25 SYRINGE (ML) INTRAVENOUS ONCE
Refills: 0 | Status: DISCONTINUED | OUTPATIENT
Start: 2022-10-22 | End: 2022-10-23

## 2022-10-22 RX ORDER — SODIUM CHLORIDE 9 MG/ML
1000 INJECTION, SOLUTION INTRAVENOUS
Refills: 0 | Status: DISCONTINUED | OUTPATIENT
Start: 2022-10-22 | End: 2022-11-14

## 2022-10-22 RX ORDER — INSULIN LISPRO 100/ML
VIAL (ML) SUBCUTANEOUS
Refills: 0 | Status: DISCONTINUED | OUTPATIENT
Start: 2022-10-22 | End: 2022-10-23

## 2022-10-22 RX ORDER — GLUCAGON INJECTION, SOLUTION 0.5 MG/.1ML
1 INJECTION, SOLUTION SUBCUTANEOUS ONCE
Refills: 0 | Status: DISCONTINUED | OUTPATIENT
Start: 2022-10-22 | End: 2022-11-14

## 2022-10-22 RX ORDER — TAMSULOSIN HYDROCHLORIDE 0.4 MG/1
0.4 CAPSULE ORAL AT BEDTIME
Refills: 0 | Status: DISCONTINUED | OUTPATIENT
Start: 2022-10-22 | End: 2022-11-14

## 2022-10-22 RX ORDER — DEXTROSE 50 % IN WATER 50 %
15 SYRINGE (ML) INTRAVENOUS ONCE
Refills: 0 | Status: DISCONTINUED | OUTPATIENT
Start: 2022-10-22 | End: 2022-10-23

## 2022-10-22 RX ORDER — ACETAMINOPHEN 500 MG
650 TABLET ORAL ONCE
Refills: 0 | Status: COMPLETED | OUTPATIENT
Start: 2022-10-22 | End: 2022-10-22

## 2022-10-22 RX ORDER — VANCOMYCIN HCL 1 G
1000 VIAL (EA) INTRAVENOUS ONCE
Refills: 0 | Status: COMPLETED | OUTPATIENT
Start: 2022-10-22 | End: 2022-10-22

## 2022-10-22 RX ORDER — DEXTROSE 50 % IN WATER 50 %
12.5 SYRINGE (ML) INTRAVENOUS ONCE
Refills: 0 | Status: DISCONTINUED | OUTPATIENT
Start: 2022-10-22 | End: 2022-10-23

## 2022-10-22 RX ORDER — GABAPENTIN 400 MG/1
300 CAPSULE ORAL
Refills: 0 | Status: DISCONTINUED | OUTPATIENT
Start: 2022-10-22 | End: 2022-11-14

## 2022-10-22 RX ORDER — CARVEDILOL PHOSPHATE 80 MG/1
25 CAPSULE, EXTENDED RELEASE ORAL EVERY 12 HOURS
Refills: 0 | Status: DISCONTINUED | OUTPATIENT
Start: 2022-10-22 | End: 2022-11-14

## 2022-10-22 RX ORDER — ALLOPURINOL 300 MG
100 TABLET ORAL DAILY
Refills: 0 | Status: DISCONTINUED | OUTPATIENT
Start: 2022-10-22 | End: 2022-11-14

## 2022-10-22 RX ADMIN — Medication 5: at 23:00

## 2022-10-22 RX ADMIN — ATORVASTATIN CALCIUM 80 MILLIGRAM(S): 80 TABLET, FILM COATED ORAL at 20:19

## 2022-10-22 RX ADMIN — Medication 250 MILLIGRAM(S): at 16:02

## 2022-10-22 RX ADMIN — Medication 81 MILLIGRAM(S): at 20:20

## 2022-10-22 RX ADMIN — CARVEDILOL PHOSPHATE 25 MILLIGRAM(S): 80 CAPSULE, EXTENDED RELEASE ORAL at 23:00

## 2022-10-22 RX ADMIN — PIPERACILLIN AND TAZOBACTAM 25 GRAM(S): 4; .5 INJECTION, POWDER, LYOPHILIZED, FOR SOLUTION INTRAVENOUS at 23:01

## 2022-10-22 RX ADMIN — PIPERACILLIN AND TAZOBACTAM 200 GRAM(S): 4; .5 INJECTION, POWDER, LYOPHILIZED, FOR SOLUTION INTRAVENOUS at 15:41

## 2022-10-22 RX ADMIN — Medication 650 MILLIGRAM(S): at 15:54

## 2022-10-22 RX ADMIN — SODIUM CHLORIDE 1000 MILLILITER(S): 9 INJECTION, SOLUTION INTRAVENOUS at 15:54

## 2022-10-22 RX ADMIN — Medication 650 MILLIGRAM(S): at 12:38

## 2022-10-22 RX ADMIN — INSULIN GLARGINE 10 UNIT(S): 100 INJECTION, SOLUTION SUBCUTANEOUS at 23:01

## 2022-10-22 RX ADMIN — TAMSULOSIN HYDROCHLORIDE 0.4 MILLIGRAM(S): 0.4 CAPSULE ORAL at 23:00

## 2022-10-22 NOTE — ED ADULT NURSE NOTE - OBJECTIVE STATEMENT
62 yr old male to ED via EMS from home, Wife coming. Pt poor historian Confused, Not answering questions correctly Stating " I 'm in [place]." Resp labored 02 sat 91 % r/a Oxygen delivered n/c 2l o2 sat 98%. Per EMS recently admitted at [facility] for Hyperglycemia.  Temp 102.5 Tylenol suppository given No sacral redness or breakdown. Pt straight cath with sterile tech MD at bedside Specimen to lab. IV's rt arm x2 rt a/c and lower arm No redness or swelling at insertion site. Made comfortable. Abd distended Urinary output with straight cath approx 500cc. Ecchymosis to abd. Had recent admission. Wife present Pt fell x 2weeks ago and has inj to to foot. 62 yr old male to ED via EMS from home, Wife coming. Pt poor historian Confused, Not answering questions correctly Stating " I 'm in [place]." Resp labored 02 sat 91 % r/a Oxygen delivered n/c 2l o2 sat 98%. Per EMS recently admitted at [facility] for Hyperglycemia/ DKA . D/c home Thursday. Per wife has been having intermittent fevers. Worsening pain with movement to upper extremities.  Temp 102.5 Tylenol suppository given No sacral redness or breakdown. Pt straight cath with sterile tech MD at bedside Specimen to lab. IV's rt arm x2 rt a/c and lower arm No redness or swelling at insertion site. Made comfortable. Abd distended Urinary output with straight cath approx 500cc. Ecchymosis to abd. Had recent admission. Wife present Pt fell x 2weeks ago and has inj to to foot.

## 2022-10-22 NOTE — ED PROVIDER NOTE - PROGRESS NOTE DETAILS
ED Sign Out, eval for delirium/malaise, pending imaging and close reassessments for planned admission -- Marlon Carrillo MD [facility]DS VSS. Pt work up for sepsis. Admitted to med

## 2022-10-22 NOTE — H&P ADULT - HISTORY OF PRESENT ILLNESS
62 yom   hx of CKD,   s/p DKA, DM.     cardiomyopathy,  CAD w/ stents, HTN, gout       p/w days of generalized weakness and AMS    .Per wife, pt was just in [facility] for DKA.     d/c'd w/ fever and still weak. Since tuesday, pt becoming more lethargic and altered especially when he waking up in morning   glucose,  have been in the 200s since per cont BG monitor. Subjective fevers at home,    Question of if pt has heather compliant w/ short and long acting insulin as wife works nights and is not there to give all meds at home.    Unclear if pt has been falling at home but w/ bruising on his abdomen

## 2022-10-22 NOTE — ED PROVIDER NOTE - NS ED ROS FT
Constitutional:  (-) chills, (+) lethargy  Eyes: (-) visual changes  ENMT: (-) nasal discharge, (-) neck pain or stiffness  Cardiac: (-) chest pain   Respiratory:  (-) cough   GI:  (-) nausea (-) vomiting (-) diarrhea   :  (-) dysuria   MS:  (-) back pain  Neuro:  (-) headache (-) focal weakness  Skin:  (-) rash  Except as documented in the HPI,  all other systems are negative

## 2022-10-22 NOTE — ED PROVIDER NOTE - OBJECTIVE STATEMENT
62 yom hx of DKA, DMtype1, HTN, gout p/w days of generalized weakness and AMS. Per wife, pt was just in [facility] for DKA. Was d/c'd w/ fever 62 yom hx of CKD, DKA, DMtype2, cardiomyopathy w/ stents, HTN, gout p/w days of generalized weakness and AMS. Per wife, pt was just in [facility] for DKA. Was d/c'd w/ fever and still weak. Since tuesday, pt becoming more lethargic and altered especially when he waking up in morning. BG have been in the 200s since per cont BG monitor. Subjective fevers at home, wife feels that his abd is larger than it was before d/c from [facility]. Question of if pt has heather compliant w/ short and long acting insulin as wife works nights and is not there to give all meds at home. Unclear if pt has been falling at home but w/ bruising on his abd.     No n/v/d. No pain when urinating. No rashes noted. No ulcers or purulant sources of infection noted. Old unhealing wound on foot, weeks old.

## 2022-10-22 NOTE — PATIENT PROFILE ADULT - FALL HARM RISK - HARM RISK INTERVENTIONS
Assistance with ambulation/Assistance OOB with selected safe patient handling equipment/Communicate Risk of Fall with Harm to all staff/Discuss with provider need for PT consult/Monitor gait and stability/Provide patient with walking aids - walker, cane, crutches/Reinforce activity limits and safety measures with patient and family/Sit up slowly, dangle for a short time, stand at bedside before walking/Tailored Fall Risk Interventions/Visual Cue: Yellow wristband and red socks/Bed in lowest position, wheels locked, appropriate side rails in place/Call bell, personal items and telephone in reach/Instruct patient to call for assistance before getting out of bed or chair/Non-slip footwear when patient is out of bed/[place] to call system/Physically safe environment - no spills, clutter or unnecessary equipment/Purposeful Proactive Rounding/Room/bathroom lighting operational, light cord in reach

## 2022-10-22 NOTE — ED ADULT TRIAGE NOTE - HEIGHT IN CM
170.18 Drysol Counseling:  I discussed with the patient the risks of drysol/aluminum chloride including but not limited to skin rash, itching, irritation, burning.

## 2022-10-22 NOTE — ED PROVIDER NOTE - DOMESTIC TRAVEL HIGH RISK QUESTION
No
13.5   5.02  )-----------( 162      ( 16 Nov 2020 05:54 )             40.2     11-16    140  |  105  |  18  ----------------------------<  161<H>  3.7   |  27  |  1.06    Ca    9.3      16 Nov 2020 05:54  Phos  3.9     11-16  Mg     2.0     11-16    TPro  7.2  /  Alb  3.3<L>  /  TBili  0.5  /  DBili  x   /  AST  19  /  ALT  20  /  AlkPhos  56  11-16

## 2022-10-22 NOTE — ED ADULT NURSE NOTE - ED STAT RN HANDOFF DETAILS
Pt endorsed from previous shift. Pt AxOx3 with slightly drowsy. Pt assigned bed, awaiting transportation to unit. Pt attached to cardiac monitor. Breathing even and unlabored. Will continue to monitor closely.

## 2022-10-22 NOTE — ED PROVIDER NOTE - PHYSICAL EXAMINATION
CONSTITUTIONAL: sick-appearing  SKIN: Warm dry, no noted wounds that seem infected  EYES: no scleral icterus, conjunctiva pink  ENT: normal pharynx with no erythema or exudates  NECK: Supple; non tende  CARD: RRR, no murmurs.  RESP: clear to ausculation b/l. No crackles or wheezing.  ABD: abd grossly distended w/ bruising noted on anterior portion of abd. Diffuse pain per pt rxn in face.   MSK: no pedal edema, no calf tenderness  NEURO: contracted w/ pain when extending or touching any portion of body   PSYCH: AOx2, states he's in [place] CONSTITUTIONAL: sick-appearing  SKIN: Warm dry, no noted wounds that seem infected, scratches to dorsum R toes w/o surrounding erythema   EYES: no scleral icterus, conjunctiva pink  ENT: normal pharynx with no erythema or exudates  NECK: Supple; non tende  CARD: RRR, no murmurs.  RESP: clear to ausculation b/l. No crackles or wheezing.  ABD: abd softly distended w/ bruising noted on lower anterior portion possibly d/t recent hospitalization and SQ hep. Non tender to palp  MSK: no pedal edema, no calf tenderness, calls out in pain w mvmt of arms and legs, log rolling. No defromity or localized edema/swelling noted.   NEURO: Drowsy but rousable to voice, disoriented, confused no fnd.

## 2022-10-22 NOTE — ED PROVIDER NOTE - CLINICAL SUMMARY MEDICAL DECISION MAKING FREE TEXT BOX
62 yom hx of diabetes, DKA, CKD w/ fistula recently created, p/w AMS and high fever.     ddx of sepsis d/t likely UTI vs skin infxn vs PNA, perhaps SBP given distended abd. Possible acute encephalopathy d/t uremia, trauma. His exam is nonfocal w/o obvious source. VSS in room w/ pt.     panscan to eval abd and head given AMS w/ poor story.   UTI possible given incontinence, will culture. Blood culture 2x drawn. Fever control, admit labs. xray for PNA.   likely will start abx on pt   admit likely 62 yom hx of diabetes, DKA, CKD w/ LUE fistula recently created, presents from home w AMS and found to be febrile. Pt unable to provide hx. Hx per wife above.     ddx of sepsis possibly d/t UTI, PNA, intra-abd infxn, given poor historian and unreliable exam will get CTCAP. Pt delirious likely due to fever vs toxic metabolic encephalopathy, dehydration, DKA, electrolyte abn. Less likely bleed, no AC, no recent trauma.     Sepsis labs and cultures, imaging as above, abx, TBA.

## 2022-10-22 NOTE — ED ADULT TRIAGE NOTE - DOMESTIC TRAVEL HIGH RISK QUESTION
AURY MCCLELLAN    Patient Age: 76 year old   Refill request by: Pharmacy fax  Refill to be: [facility]Pres[facility]    Medication requested to be refilled:   CEFPODOXIME  MG TABLETS       WEIGHT AND HEIGHT:   Wt Readings from Last 1 Encounters:   03/22/21 53.5 kg (118 lb)     Ht Readings from Last 1 Encounters:   02/17/21 5' 4\" (1.626 m)     BMI Readings from Last 1 Encounters:   03/22/21 20.25 kg/m²       ALLERGIES:  Ciprofloxacin, Metronidazole, Morphine, Moxifloxacin, Nitrofurantoin, Penicillins, Celecoxib, Doxycycline, Cleocin, Duloxetine hcl, Hydrocodone-acetaminophen, and Seasonal  Current Outpatient Medications   Medication   • cycloSPORINE (RESTASIS) 0.05 % ophthalmic emulsion   • gabapentin (NEURONTIN) 300 MG capsule   • triamcinolone (ARISTOCORT) 0.1 % cream   • furosemide (LASIX) 20 MG tablet   • potassium CHLORIDE (KLOR-CON M) 20 MEQ glen ER tablet   • ondansetron (ZOFRAN) 4 MG tablet   • lisinopril (ZESTRIL) 10 MG tablet   • PROLIA 60 MG/ML Solution Prefilled Syringe   • traZODone (DESYREL) 100 MG tablet   • sucralfate (CARAFATE) 1 g tablet   • calcium carbonate (OS-KANDACE) 600 MG Tab   • Cholecalciferol (VITAMIN D3) 2000 units capsule   • magnesium gluconate (MAGONATE) 500 MG tablet   • pancrelipase, Lip-Prot-Amyl, (CREON) 58959-355[address] units per capsule     No current facility-administered medications for this visit.     PHARMACY to use:     [id number] - [place], [address] AT [facility] & [address] [place] [address]  [address] [facility] [place] [address]  Phone: [phone] Fax: [phone]            Pharmacy preference(s) on file:   [facility][id number] - [place], [place] [address] [facility] AT [facility] & [address] [place] [address]  [address] [facility]  [place] [address]  Phone: [phone] Fax: [phone]      CALL BACK INFO: Ok to leave response (including medical information) with family member or on answering machine  ROUTING: Patient's physician/staff         PCP: Jame Buenrostro MD         INS: Payor: MEDICARE RR / Plan: MEDICARE RR PART A AND B / Product Type: MEDICARE   PATIENT ADDRESS:  [address] [place] [id number]     No

## 2022-10-22 NOTE — H&P ADULT - NSHPLABSRESULTS_GEN_ALL_CORE
LABS:                        11.0   12.01 )-----------( 325      ( 22 Oct 2022 12:41 )             34.6     10-22    133<L>  |  91<L>  |  74<H>  ----------------------------<  282<H>  4.8   |  22  |  5.43<H>    Ca    9.3      22 Oct 2022 12:41    TPro  7.5  /  Alb  3.1<L>  /  TBili  0.4  /  DBili  x   /  AST  22  /  ALT  17  /  AlkPhos  214<H>  10-22    PT/INR - ( 22 Oct 2022 12:41 )   PT: 12.8 sec;   INR: 1.11 ratio         PTT - ( 22 Oct 2022 12:41 )  PTT:33.0 sec      Urinalysis Basic - ( 22 Oct 2022 12:47 )    Color: Light Yellow / Appearance: Clear / S.013 / pH: x  Gluc: x / Ketone: Trace  / Bili: Negative / Urobili: Negative   Blood: x / Protein: 300 mg/dL / Nitrite: Negative   Leuk Esterase: Negative / RBC: 11 /hpf / WBC 6 /HPF   Sq Epi: x / Non Sq Epi: 5 /hpf / Bacteria: Negative          10-22 @ 12:32  5.9  57

## 2022-10-22 NOTE — H&P ADULT - ASSESSMENT
2 yom   hx of CKD,   s/p DKA, DM.     cardiomyopathy,  CAD w/ stents, HTN, gout       p/w days of generalized weakness and AMS    .Per wife, pt was just in [facility] for DKA.     d/c'd w/ fever and still weak. Since tuesday, pt becoming more lethargic and altered especially when he waking up in morning   glucose,  have been in the 200s since per cont BG monitor. Subjective fevers at home,    Question of if pt has heather compliant w/ short and long acting insulin as wife works nights and is not there to give all meds at home.    Unclear if pt has been falling at home but w/ bruising on his abdomen        pt with fevers/ ams. / metabolic  encephalopathy .  wbc   is  12,000   follow  bcx/  uvx. on iv    Zosyn     Ct  a/p,   ? cystitis    House  ID   CKD,  crt is 5,4   DM,  follow  fs   on lantus/ endo d r talley   renal to  f/p   ef 40   on dvt  ppx       rad< from: CT Abdomen and Pelvis No Cont (10.22.22 @ 15:40) >  IMPRESSION:  Mild bladder wall thickening. Correlate with urinalysis  --- End of Report ---  < end of copied text >      rad< from: Transthoracic Echocardiogram (12.07.21 @ 11:24) >  nclusions:  1. Normal mitral valve. Minimal mitral regurgitation.  2. Calcified trileaflet aortic valve with normal opening.  Minimal aortic regurgitation.  3. Mild segmental left ventricular systolic dysfunction.  The basal inferior, basal septum, distal septum, distal  inferior, and the apex are akinetic. Recommend limited  repeat imaging with intravenous echo contrast to better  visualize the apex. (Per sonographer, patient did not agree  to echo contrast during the study.)  4. Mild diastolic dysfunction (Stage I).  5. Normal right ventricular size and function.  *** No previous Echo exam.    < end of copied text >         2 yom   hx of CKD,   s/p DKA, DM.     cardiomyopathy,  CAD w/ stents, HTN, gout       p/w days of generalized weakness and AMS    .Per wife, pt was just in [facility] for DKA.     d/c'd w/ fever and still weak. Since tuesday, pt becoming more lethargic and altered especially when he waking up in morning   glucose,  have been in the 200s since per cont BG monitor. Subjective fevers at home,    Question of if pt has heather compliant w/ short and long acting insulin as wife works nights and is not there to give all meds at home.    Unclear if pt has been falling at home but w/ bruising on his abdomen        pt with fevers/ ams. / metabolic  encephalopathy .  wbc   is  12,000   follow  bcx/  uvx. on iv    Zosyn     Ct  a/p,   ? cystitis    House  ID   CKD,  crt is 5,4/  house  renal    DM,  follow  fs    on lantus/ endo d r talley   renal to  f/p in am    ef 40   on dvt  ppx        hic  awrae     rad< from: CT Abdomen and Pelvis No Cont (10.22.22 @ 15:40) >  IMPRESSION:  Mild bladder wall thickening. Correlate with urinalysis  --- End of Report ---  < end of copied text >      rad< from: Transthoracic Echocardiogram (12.07.21 @ 11:24) >  nclusions:  1. Normal mitral valve. Minimal mitral regurgitation.  2. Calcified trileaflet aortic valve with normal opening.  Minimal aortic regurgitation.  3. Mild segmental left ventricular systolic dysfunction.  The basal inferior, basal septum, distal septum, distal  inferior, and the apex are akinetic. Recommend limited  repeat imaging with intravenous echo contrast to better  visualize the apex. (Per sonographer, patient did not agree  to echo contrast during the study.)  4. Mild diastolic dysfunction (Stage I).  5. Normal right ventricular size and function.  *** No previous Echo exam.    < end of copied text >         2 yom   hx of CKD,   s/p DKA, DM.     cardiomyopathy,  CAD w/ stents, HTN, gout     c/c  weakness, with decreased  mobility of  all limbs      p/w days of generalized weakness and AMS    .Per wife, pt was just in [facility] for DKA.     d/c'd w/ fever and still weak. Since tuesday, pt becoming more lethargic and altered especially when he waking up in morning   glucose,  have been in the 200s since per cont BG monitor. Subjective fevers at home,    Question of if pt has heather compliant w/ short and long acting insulin as wife works nights and is not there to give all meds at home.           pt with fevers/ ams. / metabolic  encephalopathy .  wbc   is  12,000   follow  bcx/  ucx  on iv    Zosyn     Ct  a/p,   ? cystitis   ID eval   CKD,  crt is 5,4/  house  renal    DM,  follow  fs    on lantus/ endo d r talley   renal to  f/p in am    ef 40   on dvt  ppx        hic  awrae     rad< from: CT Abdomen and Pelvis No Cont (10.22.22 @ 15:40) >  IMPRESSION:  Mild bladder wall thickening. Correlate with urinalysis  --- End of Report ---  < end of copied text >      rad< from: Transthoracic Echocardiogram (12.07.21 @ 11:24) >  nclusions:  1. Normal mitral valve. Minimal mitral regurgitation.  2. Calcified trileaflet aortic valve with normal opening.  Minimal aortic regurgitation.  3. Mild segmental left ventricular systolic dysfunction.  The basal inferior, basal septum, distal septum, distal  inferior, and the apex are akinetic. Recommend limited  repeat imaging with intravenous echo contrast to better  visualize the apex. (Per sonographer, patient did not agree  to echo contrast during the study.)  4. Mild diastolic dysfunction (Stage I).  5. Normal right ventricular size and function.  *** No previous Echo exam.    < end of copied text >

## 2022-10-22 NOTE — CONSULT NOTE ADULT - SUBJECTIVE AND OBJECTIVE BOX
CHIEF COMPLAINT:Patient is a 62y old  Male who presents with a chief complaint of     HPI:  62 yom hx of CKD, DKA, DMtype2, cardiomyopathy w/ stents, HTN, gout p/w days of generalized weakness and AMS. Per wife, pt was just in [facility] for DKA. Was d/c'd w/ fever and still weak. Since tuesday, pt becoming more lethargic and altered especially when he waking up in morning. BG have been in the 200s since per cont BG monitor. Subjective fevers at home, wife feels that his abd is larger than it was before d/c from [facility]. Question of if pt has heather compliant w/ short and long acting insulin as wife works nights and is not there to give all meds at home. Unclear if pt has been falling at home but w/ bruising on his abd.     PAST MEDICAL & SURGICAL HISTORY:  Diabetes Mellitus Type II, Uncontrolled      Dyslipidemia      s/p Angioplasty with Stent      HTN (hypertension)      Gout      Diabetic retinopathy      GERD (gastroesophageal reflux disease)      Nephrolithiasis      Vitamin D deficiency      Hepatitis      CKD (chronic kidney disease)      Ischemic cardiomyopathy      ASHD (arteriosclerotic heart disease)      Pulmonary hypertension      Myocardial infarction      CAD (coronary artery disease)      BPH (benign prostatic hyperplasia)      Retinal Hemorrhage      S/P coronary artery stent placement  2010 TRICIA to Diag ; 11/18/2010  LAD; 2/4/11 ATOM liberte Diag; 5/15/2017  TRICIA to 1st diag; 6/7/17 PCI with laser and high pressure balloon      History of eye surgery  left eye      H/O lithotripsy      Diabetes with retinopathy  S/P PPV/PRP/GAS  OD 2/22/17 for viterous hemorrhage      CHF with cardiomyopathy  Insertion of Cardiomems monitor      Stented coronary artery          MEDICATIONS  (STANDING):  allopurinol 100 milliGRAM(s) Oral daily  aspirin enteric coated 81 milliGRAM(s) Oral daily  atorvastatin 80 milliGRAM(s) Oral at bedtime  carvedilol 25 milliGRAM(s) Oral every 12 hours  gabapentin 300 milliGRAM(s) Oral two times a day  tamsulosin 0.4 milliGRAM(s) Oral at bedtime    MEDICATIONS  (PRN):      FAMILY HISTORY:  Family history of cardiac disorder in father (Father)        SOCIAL HISTORY:    [x ] Non-smoker  [ ] Smoker  [ ] Alcohol    Allergies    No Known Allergies    Intolerances    	    REVIEW OF SYSTEMS:  CONSTITUTIONAL: No fever, weight loss, or fatigue  EYES: No eye pain, visual disturbances, or discharge  ENT:  No difficulty hearing, tinnitus, vertigo; No sinus or throat pain  NECK: No pain or stiffness  RESPIRATORY: No cough, wheezing, chills or hemoptysis; No Shortness of Breath  CARDIOVASCULAR: No chest pain, palpitations, passing out, dizziness, or leg swelling  GASTROINTESTINAL: No abdominal or epigastric pain. No nausea, vomiting, or hematemesis; No diarrhea or constipation. No melena or hematochezia.  GENITOURINARY: No dysuria, frequency, hematuria, or incontinence  NEUROLOGICAL: No headaches, memory loss, loss of strength, numbness, or tremors  SKIN: No itching, burning, rashes, or lesions   LYMPH Nodes: No enlarged glands  ENDOCRINE: No heat or cold intolerance; No hair loss  MUSCULOSKELETAL: No joint pain or swelling; No muscle, back, or extremity pain  PSYCHIATRIC: No depression, anxiety, mood swings, or difficulty sleeping  HEME/LYMPH: No easy bruising, or bleeding gums  ALLERGY AND IMMUNOLOGIC: No hives or eczema	    [ ] All others negative	  [ ] Unable to obtain    PHYSICAL EXAM:  T(C): 36.8 (10-22-22 @ 17:12), Max: 39.2 (10-22-22 @ 12:34)  HR: 84 (10-22-22 @ 18:16) (84 - 112)  BP: 133/86 (10-22-22 @ 18:16) (132/69 - 152/70)  RR: 20 (10-22-22 @ 18:16) (16 - 22)  SpO2: 100% (10-22-22 @ 18:16) (91% - 100%)  Wt(kg): --  I&O's Summary      Appearance: Normal	  HEENT:   Normal oral mucosa, PERRL, EOMI	  Lymphatic: No lymphadenopathy  Cardiovascular: Normal S1 S2, No JVD, + murmurs, No edema  Respiratory: rhonchi  Gastrointestinal:  Soft, Non-tender, + BS	  Skin: No rashes, No ecchymoses, No cyanosis	  Extremities: Normal range of motion, No clubbing, cyanosis or edema  Vascular: Peripheral pulses palpable 2+ bilaterally    TELEMETRY: 	    ECG:  	  RADIOLOGY:  OTHER: 	  	  LABS:	 	    CARDIAC MARKERS:                              11.0   12.01 )-----------( 325      ( 22 Oct 2022 12:41 )             34.6     10-22    133<L>  |  91<L>  |  74<H>  ----------------------------<  282<H>  4.8   |  22  |  5.43<H>    Ca    9.3      22 Oct 2022 12:41    TPro  7.5  /  Alb  3.1<L>  /  TBili  0.4  /  DBili  x   /  AST  22  /  ALT  17  /  AlkPhos  214<H>  10-22    proBNP:   Lipid Profile:   HgA1c:   TSH:   PT/INR - ( 22 Oct 2022 12:41 )   PT: 12.8 sec;   INR: 1.11 ratio         PTT - ( 22 Oct 2022 12:41 )  PTT:33.0 sec    PREVIOUS DIAGNOSTIC TESTING:      < from: 12 Lead ECG (12.07.21 @ 06:09) >  Diagnosis Line SINUS RHYTHM WITH OCCASIONAL PREMATURE VENTRICULAR COMPLEXES  SEPTAL INFARCT (CITED ON OR BEFORE 06-DEC-2021)  ABNORMAL ECG  WHEN COMPARED WITH ECG OF 06-DEC-2021 13:26, (UNCONFIRMED)  SIGNIFICANT CHANGES HAVE OCCURRED  < from: CT Chest No Cont (10.22.22 @ 15:39) >  LUNGS AND LARGE AIRWAYS: Patent central airways. Bibasilar subsegmental   atelectasis.  PLEURA: No pleural effusion.  VESSELS: Aortic and coronary artery calcifications. Left pulmonary artery   monitoring device.  HEART: Heart size is normal. No pericardial effusion.  MEDIASTINUM AND MARGARETTE: No lymphadenopathy.  CHEST WALL AND LOWER NECK: Bilateral gynecomastia.    ABDOMEN AND PELVIS:  LIVER: Within normal limits.  BILE DUCTS:Normal caliber.  GALLBLADDER: Cholelithiasis.  SPLEEN: Within normal limits.  PANCREAS: Within normal limits.  ADRENALS: Within normal limits.  KIDNEYS/URETERS: Within normal limits.    BLADDER: Mild bladder wall thickening.  REPRODUCTIVE ORGANS: Prostate within normal limits.    BOWEL: No bowel obstruction. Appendix is normal.  PERITONEUM: No ascites.  VESSELS: Atherosclerotic changes.  RETROPERITONEUM/LYMPH NODES: No lymphadenopathy.  ABDOMINAL WALL: Within normal limits.  BONES: Within normal limits.    IMPRESSION:  Mild bladder wall thickening. Correlate with urinalysis.

## 2022-10-22 NOTE — H&P ADULT - NSHPPHYSICALEXAM_GEN_ALL_CORE
PHYSICAL EXAMINATION:  Vital Signs Last 24 Hrs  T(C): 36.8 (22 Oct 2022 17:12), Max: 39.2 (22 Oct 2022 12:34)  T(F): 98.3 (22 Oct 2022 17:12), Max: 102.6 (22 Oct 2022 12:34)  HR: 84 (22 Oct 2022 18:16) (84 - 112)  BP: 133/86 (22 Oct 2022 18:16) (132/69 - 152/70)  BP(mean): --  RR: 20 (22 Oct 2022 18:16) (16 - 22)  SpO2: 100% (22 Oct 2022 18:16) (91% - 100%)    Parameters below as of 22 Oct 2022 18:16  Patient On (Oxygen Delivery Method): nasal cannula  O2 Flow (L/min): 2    CAPILLARY BLOOD GLUCOSE      POCT Blood Glucose.: 261 mg/dL (22 Oct 2022 12:13)        GENERAL: NAD, well-groomed,  HEAD:  atraumatic, normocephalic  EYES: sclera anicteric  ENMT: mucous membranes moist  NECK: supple, No JVD  CHEST/LUNG: clear to auscultation bilaterally;    no      rales   ,   no rhonchi,   HEART: normal S1, S2  ABDOMEN: BS+, soft, ND, NT   EXTREMITIES:    no    edema    b/l LEs  NEURO: awake, ,     moves all extremities  SKIN: no     rash PHYSICAL EXAMINATION:  Vital Signs Last 24 Hrs  T(C): 36.8 (22 Oct 2022 17:12), Max: 39.2 (22 Oct 2022 12:34)  T(F): 98.3 (22 Oct 2022 17:12), Max: 102.6 (22 Oct 2022 12:34)  HR: 84 (22 Oct 2022 18:16) (84 - 112)  BP: 133/86 (22 Oct 2022 18:16) (132/69 - 152/70)  BP(mean): --  RR: 20 (22 Oct 2022 18:16) (16 - 22)  SpO2: 100% (22 Oct 2022 18:16) (91% - 100%)    Parameters below as of 22 Oct 2022 18:16  Patient On (Oxygen Delivery Method): nasal cannula  O2 Flow (L/min): 2    CAPILLARY BLOOD GLUCOSE      POCT Blood Glucose.: 261 mg/dL (22 Oct 2022 12:13)        GENERAL: NAD, well-groomed,  HEAD:  atraumatic, normocephalic  EYES: sclera anicteric  ENMT: mucous membranes moist  NECK: supple, No JVD  CHEST/LUNG: clear to auscultation bilaterally;    no      rales   ,   no rhonchi,   HEART: normal S1, S2  ABDOMEN: BS+, soft, ND, NT   EXTREMITIES:    no    edema    b/l LEs  NEURO: awake, .  all limbs  with weakness  SKIN: no     rash

## 2022-10-23 DIAGNOSIS — E11.9 TYPE 2 DIABETES MELLITUS WITHOUT COMPLICATIONS: ICD-10-CM

## 2022-10-23 DIAGNOSIS — I25.10 ATHEROSCLEROTIC HEART DISEASE OF NATIVE CORONARY ARTERY WITHOUT ANGINA PECTORIS: ICD-10-CM

## 2022-10-23 DIAGNOSIS — N18.9 CHRONIC KIDNEY DISEASE, UNSPECIFIED: ICD-10-CM

## 2022-10-23 LAB
CULTURE RESULTS: NO GROWTH — SIGNIFICANT CHANGE UP
GLUCOSE BLDC GLUCOMTR-MCNC: 178 MG/DL — HIGH (ref 70–99)
GLUCOSE BLDC GLUCOMTR-MCNC: 262 MG/DL — HIGH (ref 70–99)
GLUCOSE BLDC GLUCOMTR-MCNC: 315 MG/DL — HIGH (ref 70–99)
GLUCOSE BLDC GLUCOMTR-MCNC: 363 MG/DL — HIGH (ref 70–99)
HCT VFR BLD CALC: 30 % — LOW (ref 39–50)
HGB BLD-MCNC: 9.4 G/DL — LOW (ref 13–17)
MCHC RBC-ENTMCNC: 28.6 PG — SIGNIFICANT CHANGE UP (ref 27–34)
MCHC RBC-ENTMCNC: 31.3 GM/DL — LOW (ref 32–36)
MCV RBC AUTO: 91.2 FL — SIGNIFICANT CHANGE UP (ref 80–100)
NRBC # BLD: 0 /100 WBCS — SIGNIFICANT CHANGE UP (ref 0–0)
PLATELET # BLD AUTO: 268 K/UL — SIGNIFICANT CHANGE UP (ref 150–400)
RBC # BLD: 3.29 M/UL — LOW (ref 4.2–5.8)
RBC # FLD: 14.4 % — SIGNIFICANT CHANGE UP (ref 10.3–14.5)
SPECIMEN SOURCE: SIGNIFICANT CHANGE UP
VANCOMYCIN FLD-MCNC: 9.9 UG/ML — SIGNIFICANT CHANGE UP
WBC # BLD: 9.49 K/UL — SIGNIFICANT CHANGE UP (ref 3.8–10.5)
WBC # FLD AUTO: 9.49 K/UL — SIGNIFICANT CHANGE UP (ref 3.8–10.5)

## 2022-10-23 PROCEDURE — 70450 CT HEAD/BRAIN W/O DYE: CPT | Mod: 26

## 2022-10-23 RX ORDER — SODIUM CHLORIDE 9 MG/ML
1000 INJECTION INTRAMUSCULAR; INTRAVENOUS; SUBCUTANEOUS
Refills: 0 | Status: DISCONTINUED | OUTPATIENT
Start: 2022-10-23 | End: 2022-11-03

## 2022-10-23 RX ORDER — COLCHICINE 0.6 MG
0.3 TABLET ORAL
Refills: 0 | Status: DISCONTINUED | OUTPATIENT
Start: 2022-10-23 | End: 2022-10-28

## 2022-10-23 RX ORDER — INSULIN LISPRO 100/ML
VIAL (ML) SUBCUTANEOUS AT BEDTIME
Refills: 0 | Status: DISCONTINUED | OUTPATIENT
Start: 2022-10-23 | End: 2022-10-23

## 2022-10-23 RX ORDER — INSULIN GLARGINE 100 [IU]/ML
18 INJECTION, SOLUTION SUBCUTANEOUS AT BEDTIME
Refills: 0 | Status: DISCONTINUED | OUTPATIENT
Start: 2022-10-23 | End: 2022-10-23

## 2022-10-23 RX ORDER — INSULIN LISPRO 100/ML
7 VIAL (ML) SUBCUTANEOUS
Refills: 0 | Status: DISCONTINUED | OUTPATIENT
Start: 2022-10-23 | End: 2022-10-23

## 2022-10-23 RX ORDER — INSULIN LISPRO 100/ML
VIAL (ML) SUBCUTANEOUS
Refills: 0 | Status: DISCONTINUED | OUTPATIENT
Start: 2022-10-23 | End: 2022-11-14

## 2022-10-23 RX ORDER — INSULIN LISPRO 100/ML
VIAL (ML) SUBCUTANEOUS AT BEDTIME
Refills: 0 | Status: DISCONTINUED | OUTPATIENT
Start: 2022-10-23 | End: 2022-11-14

## 2022-10-23 RX ORDER — INSULIN LISPRO 100/ML
4 VIAL (ML) SUBCUTANEOUS ONCE
Refills: 0 | Status: DISCONTINUED | OUTPATIENT
Start: 2022-10-23 | End: 2022-10-23

## 2022-10-23 RX ORDER — MEROPENEM 1 G/30ML
1000 INJECTION INTRAVENOUS EVERY 12 HOURS
Refills: 0 | Status: DISCONTINUED | OUTPATIENT
Start: 2022-10-23 | End: 2022-10-26

## 2022-10-23 RX ORDER — VANCOMYCIN HCL 1 G
1000 VIAL (EA) INTRAVENOUS ONCE
Refills: 0 | Status: COMPLETED | OUTPATIENT
Start: 2022-10-23 | End: 2022-10-23

## 2022-10-23 RX ORDER — OXYCODONE AND ACETAMINOPHEN 5; 325 MG/1; MG/1
1 TABLET ORAL EVERY 12 HOURS
Refills: 0 | Status: DISCONTINUED | OUTPATIENT
Start: 2022-10-23 | End: 2022-10-29

## 2022-10-23 RX ORDER — DEXTROSE 50 % IN WATER 50 %
25 SYRINGE (ML) INTRAVENOUS ONCE
Refills: 0 | Status: DISCONTINUED | OUTPATIENT
Start: 2022-10-23 | End: 2022-11-14

## 2022-10-23 RX ORDER — DEXTROSE 50 % IN WATER 50 %
12.5 SYRINGE (ML) INTRAVENOUS ONCE
Refills: 0 | Status: DISCONTINUED | OUTPATIENT
Start: 2022-10-23 | End: 2022-11-14

## 2022-10-23 RX ORDER — TRAMADOL HYDROCHLORIDE 50 MG/1
25 TABLET ORAL ONCE
Refills: 0 | Status: DISCONTINUED | OUTPATIENT
Start: 2022-10-23 | End: 2022-10-23

## 2022-10-23 RX ORDER — DEXTROSE 50 % IN WATER 50 %
15 SYRINGE (ML) INTRAVENOUS ONCE
Refills: 0 | Status: DISCONTINUED | OUTPATIENT
Start: 2022-10-23 | End: 2022-11-14

## 2022-10-23 RX ORDER — INSULIN LISPRO 100/ML
7 VIAL (ML) SUBCUTANEOUS
Refills: 0 | Status: DISCONTINUED | OUTPATIENT
Start: 2022-10-23 | End: 2022-10-24

## 2022-10-23 RX ORDER — INSULIN GLARGINE 100 [IU]/ML
18 INJECTION, SOLUTION SUBCUTANEOUS AT BEDTIME
Refills: 0 | Status: DISCONTINUED | OUTPATIENT
Start: 2022-10-23 | End: 2022-10-24

## 2022-10-23 RX ADMIN — Medication 81 MILLIGRAM(S): at 13:26

## 2022-10-23 RX ADMIN — GABAPENTIN 300 MILLIGRAM(S): 400 CAPSULE ORAL at 16:55

## 2022-10-23 RX ADMIN — TRAMADOL HYDROCHLORIDE 25 MILLIGRAM(S): 50 TABLET ORAL at 02:59

## 2022-10-23 RX ADMIN — MEROPENEM 100 MILLIGRAM(S): 1 INJECTION INTRAVENOUS at 19:46

## 2022-10-23 RX ADMIN — TAMSULOSIN HYDROCHLORIDE 0.4 MILLIGRAM(S): 0.4 CAPSULE ORAL at 21:12

## 2022-10-23 RX ADMIN — OXYCODONE AND ACETAMINOPHEN 1 TABLET(S): 5; 325 TABLET ORAL at 16:46

## 2022-10-23 RX ADMIN — Medication 250 MILLIGRAM(S): at 23:02

## 2022-10-23 RX ADMIN — HEPARIN SODIUM 5000 UNIT(S): 5000 INJECTION INTRAVENOUS; SUBCUTANEOUS at 06:00

## 2022-10-23 RX ADMIN — CARVEDILOL PHOSPHATE 25 MILLIGRAM(S): 80 CAPSULE, EXTENDED RELEASE ORAL at 06:00

## 2022-10-23 RX ADMIN — Medication 7 UNIT(S): at 09:06

## 2022-10-23 RX ADMIN — SODIUM CHLORIDE 100 MILLILITER(S): 9 INJECTION INTRAMUSCULAR; INTRAVENOUS; SUBCUTANEOUS at 16:42

## 2022-10-23 RX ADMIN — Medication 4: at 09:05

## 2022-10-23 RX ADMIN — Medication 1: at 17:04

## 2022-10-23 RX ADMIN — GABAPENTIN 300 MILLIGRAM(S): 400 CAPSULE ORAL at 06:00

## 2022-10-23 RX ADMIN — ATORVASTATIN CALCIUM 80 MILLIGRAM(S): 80 TABLET, FILM COATED ORAL at 21:12

## 2022-10-23 RX ADMIN — Medication 100 MILLIGRAM(S): at 13:26

## 2022-10-23 RX ADMIN — PIPERACILLIN AND TAZOBACTAM 25 GRAM(S): 4; .5 INJECTION, POWDER, LYOPHILIZED, FOR SOLUTION INTRAVENOUS at 13:31

## 2022-10-23 RX ADMIN — Medication 0.3 MILLIGRAM(S): at 16:57

## 2022-10-23 RX ADMIN — Medication 2: at 13:24

## 2022-10-23 RX ADMIN — INSULIN GLARGINE 18 UNIT(S): 100 INJECTION, SOLUTION SUBCUTANEOUS at 22:21

## 2022-10-23 RX ADMIN — TRAMADOL HYDROCHLORIDE 25 MILLIGRAM(S): 50 TABLET ORAL at 01:59

## 2022-10-23 RX ADMIN — Medication 40 MILLIGRAM(S): at 16:53

## 2022-10-23 RX ADMIN — HEPARIN SODIUM 5000 UNIT(S): 5000 INJECTION INTRAVENOUS; SUBCUTANEOUS at 16:54

## 2022-10-23 RX ADMIN — Medication 1: at 22:21

## 2022-10-23 RX ADMIN — CARVEDILOL PHOSPHATE 25 MILLIGRAM(S): 80 CAPSULE, EXTENDED RELEASE ORAL at 16:53

## 2022-10-23 NOTE — PROGRESS NOTE ADULT - ASSESSMENT
2 yom   hx of CKD,   s/p DKA, DM.     cardiomyopathy,  CAD w/ stents, HTN, gout       p/w days of generalized weakness and AMS    .Per wife, pt was just in [facility] for DKA.     d/c'd w/ fever and still weak. Since tuesday, pt becoming more lethargic and altered especially when he waking up in morning   glucose,  have been in the 200s since per cont BG monitor. Subjective fevers at home,    Question of if pt has heather compliant w/ short and long acting insulin as wife works nights and is not there to give all meds at home.    Unclear if pt has been falling at home but w/ bruising on his abdomen        pt with fevers/ ams. / metabolic  encephalopathy   on  arrival, .  wbc   is  12,000   follow  bcx/  uvx. on iv    Zosyn     Ct  a/p,   ? cystitis    House  ID   CKD,  crt is 5,4/  house  renal    DM,  follow  fs    on lantus/  known to  nika talley   ef 40   on dvt  ppx    labs pending          rad< from: CT Abdomen and Pelvis No Cont (10.22.22 @ 15:40) >  IMPRESSION:  Mild bladder wall thickening. Correlate with urinalysis  --- End of Report ---  < end of copied text >      rad< from: Transthoracic Echocardiogram (12.07.21 @ 11:24) >  nclusions:  1. Normal mitral valve. Minimal mitral regurgitation.  2. Calcified trileaflet aortic valve with normal opening.  Minimal aortic regurgitation.  3. Mild segmental left ventricular systolic dysfunction.  The basal inferior, basal septum, distal septum, distal  inferior, and the apex are akinetic. Recommend limited  repeat imaging with intravenous echo contrast to better  visualize the apex. (Per sonographer, patient did not agree  to echo contrast during the study.)  4. Mild diastolic dysfunction (Stage I).  5. Normal right ventricular size and function.  *** No previous Echo exam.    < end of copied text >         2 yom   hx of CKD,   s/p DKA, DM.     cardiomyopathy,  CAD w/ stents, HTN, gout     generalized  weakness.  with   c/c h/o b/l leg weakness/ and   b/l arm weakness, r > l      p/w days of generalized weakness and AMS    .Per wife, pt was just in [facility] for DKA.     d/c'd w/ fever and still weak. Since tuesday, pt becoming more lethargic and altered especially when he waking up in morning   glucose,  have been in the 200s since per cont BG monitor. Subjective fevers at home,    Question of if pt has heather compliant w/ short and long acting insulin as wife works nights and is not there to give all meds at home.    Unclear if pt has been falling at home but w/ bruising on his abdomen        pt with fevers/ ams. / metabolic  encephalopathy   on  arrival, .  wbc   is  12,000   follow  bcx/  uvx. on iv    Zosyn     Ct  a/p,   ? cystitis    House  ID   CKD,  crt is 5,4/  house  renal    DM,  follow  fs    on lantus/  known to  endo d r talley   ef 40    c/c  weakness  of   limbs/  neuro d r kalkstein    ?  acuyte gout, right  wrist, on  prednisone/ coclcicine   on dvt  ppx    labs pending          rad< from: CT Abdomen and Pelvis No Cont (10.22.22 @ 15:40) >  IMPRESSION:  Mild bladder wall thickening. Correlate with urinalysis  --- End of Report ---  < end of copied text >      rad< from: Transthoracic Echocardiogram (12.07.21 @ 11:24) >  nclusions:  1. Normal mitral valve. Minimal mitral regurgitation.  2. Calcified trileaflet aortic valve with normal opening.  Minimal aortic regurgitation.  3. Mild segmental left ventricular systolic dysfunction.  The basal inferior, basal septum, distal septum, distal  inferior, and the apex are akinetic. Recommend limited  repeat imaging with intravenous echo contrast to better  visualize the apex. (Per sonographer, patient did not agree  to echo contrast during the study.)  4. Mild diastolic dysfunction (Stage I).  5. Normal right ventricular size and function.  *** No previous Echo exam.    < end of copied text >         2 yom   hx of CKD,   s/p DKA, DM.     cardiomyopathy,  CAD w/ stents, HTN, gout     generalized  weakness.  with   c/c h/o b/l leg weakness/ and   b/l arm weakness, r > l      p/w days of generalized weakness and AMS    .Per wife, pt was just in [facility] for DKA.     d/c'd w/ fever and still weak. Since tuesday, pt becoming more lethargic and altered especially when he waking up in morning   glucose,  have been in the 200s since per cont BG monitor. Subjective fevers at home,    Question of if pt has heather compliant w/ short and long acting insulin as wife works nights and is not there to give all meds at home.    Unclear if pt has been falling at home but w/ bruising on his abdomen        *   pt with fevers/ ams. / metabolic  encephalopathy   on  arrival, .  wbc   is  12,000       follow  bcx/ ucx,     on iv    Zosyn        Ct  a/p,   ? cystitis/   ID eval    * CKD,  crt is 5,4/ ,  renal  dr dickerson      c/c  anemia     *   DM,  follow  fs     on lantus/  known to  endo ga talley     *  ef 40     *  c/c  weakness  of   limbs/  neuro d r kalkstein    richards s quadriplegia     *    acute  gout, right  wrist, on  prednisone/ colchicine   on dvt  ppx             rad< from: CT Abdomen and Pelvis No Cont (10.22.22 @ 15:40) >  IMPRESSION:  Mild bladder wall thickening. Correlate with urinalysis  --- End of Report ---  < end of copied text >      rad< from: Transthoracic Echocardiogram (12.07.21 @ 11:24) >  nclusions:  1. Normal mitral valve. Minimal mitral regurgitation.  2. Calcified trileaflet aortic valve with normal opening.  Minimal aortic regurgitation.  3. Mild segmental left ventricular systolic dysfunction.  The basal inferior, basal septum, distal septum, distal  inferior, and the apex are akinetic. Recommend limited  repeat imaging with intravenous echo contrast to better  visualize the apex. (Per sonographer, patient did not agree  to echo contrast during the study.)  4. Mild diastolic dysfunction (Stage I).  5. Normal right ventricular size and function.  *** No previous Echo exam.    < end of copied text >

## 2022-10-23 NOTE — PROGRESS NOTE ADULT - SUBJECTIVE AND OBJECTIVE BOX
HPI:  62 yom   hx of CKD,   s/p DKA, DM.     cardiomyopathy,  CAD w/ stents, HTN, gout       p/w days of generalized weakness and AMS    .Per wife, pt was just in [facility] for DKA.     d/c'd w/ fever and still weak. Since tuesday, pt becoming more lethargic and altered especially when he waking up in morning   glucose,  have been in the 200s since per cont BG monitor. Subjective fevers at home,    Question of if pt has heather compliant w/ short and long acting insulin as wife works nights and is not there to give all meds at home.    Unclear if pt has been falling at home but w/ bruising on his abdomen   (22 Oct 2022 18:27)  Patient has history of diabetes,  on insulin at home, no recent hypoglycemic episodes, no polyuria polydipsia. Patient follows up with PCP for diabetes management.    PAST MEDICAL & SURGICAL HISTORY:  Diabetes Mellitus Type II, Uncontrolled      Dyslipidemia      s/p Angioplasty with Stent      HTN (hypertension)      Gout      Diabetic retinopathy      GERD (gastroesophageal reflux disease)      Nephrolithiasis      Vitamin D deficiency      Hepatitis      CKD (chronic kidney disease)      Ischemic cardiomyopathy      ASHD (arteriosclerotic heart disease)      Pulmonary hypertension      Myocardial infarction      CAD (coronary artery disease)      BPH (benign prostatic hyperplasia)      Retinal Hemorrhage      S/P coronary artery stent placement  2010 TRICIA to Diag ; 11/18/2010  LAD; 2/4/11 ATOM liberte Diag; 5/15/2017  TRICIA to 1st diag; 6/7/17 PCI with laser and high pressure balloon      History of eye surgery  left eye      H/O lithotripsy      Diabetes with retinopathy  S/P PPV/PRP/GAS  OD 2/22/17 for viterous hemorrhage      CHF with cardiomyopathy  Insertion of Cardiomems monitor      Stented coronary artery          FAMILY HISTORY:  Family history of cardiac disorder in father (Father)        Social History:    Outpatient Medications:    MEDICATIONS  (STANDING):  allopurinol 100 milliGRAM(s) Oral daily  aspirin enteric coated 81 milliGRAM(s) Oral daily  atorvastatin 80 milliGRAM(s) Oral at bedtime  carvedilol 25 milliGRAM(s) Oral every 12 hours  dextrose 5%. 1000 milliLiter(s) (50 mL/Hr) IV Continuous <Continuous>  dextrose 5%. 1000 milliLiter(s) (100 mL/Hr) IV Continuous <Continuous>  dextrose 50% Injectable 25 Gram(s) IV Push once  dextrose 50% Injectable 12.5 Gram(s) IV Push once  dextrose 50% Injectable 25 Gram(s) IV Push once  dextrose Oral Gel 15 Gram(s) Oral once  gabapentin 300 milliGRAM(s) Oral two times a day  glucagon  Injectable 1 milliGRAM(s) IntraMuscular once  heparin   Injectable 5000 Unit(s) SubCutaneous every 12 hours  insulin glargine Injectable (LANTUS) 18 Unit(s) SubCutaneous at bedtime  insulin lispro (ADMELOG) corrective regimen sliding scale   SubCutaneous three times a day before meals  insulin lispro (ADMELOG) corrective regimen sliding scale   SubCutaneous at bedtime  insulin lispro Injectable (ADMELOG) 7 Unit(s) SubCutaneous three times a day before meals  piperacillin/tazobactam IVPB.. 3.375 Gram(s) IV Intermittent every 12 hours  sodium chloride 0.9%. 1000 milliLiter(s) (100 mL/Hr) IV Continuous <Continuous>  tamsulosin 0.4 milliGRAM(s) Oral at bedtime    MEDICATIONS  (PRN):      Allergies    No Known Drug Allergies  Seafood (Unknown)  shellfish (Unknown)    Intolerances      Review of Systems:  Constitutional: No fever, no chills  Eyes: No blurry vision  Neuro: No tremors  HEENT: No pain, no neck swelling  Cardiovascular: No chest pain, no palpitations  Respiratory: No SOB, no cough  GI: No nausea, vomiting, abdominal pain  : No dysuria  Skin: no rash  MSK: Has leg swelling.  Psych: no depression  Endocrine: no polyuria, polydipsia    UNABLE TO OBTAIN    ALL OTHER SYSTEMS REVIEWED AND NEGATIVE        PHYSICAL EXAM:  VITALS: T(C): 37.4 (10-23-22 @ 08:21)  T(F): 99.3 (10-23-22 @ 08:21), Max: 99.3 (10-23-22 @ 08:21)  HR: 81 (10-23-22 @ 08:21) (81 - 95)  BP: 117/71 (10-23-22 @ 08:21) (114/75 - 166/87)  RR:  (16 - 20)  SpO2:  (97% - 100%)  Wt(kg): --  GENERAL: NAD, well-groomed, well-developed  EYES: No proptosis, no lid lag  HEENT:  Atraumatic, Normocephalic  THYROID: Normal size, no palpable nodules  RESPIRATORY: Clear to auscultation bilaterally; No rales, rhonchi, wheezing  CARDIOVASCULAR: Si S2, No murmurs;  GI: Soft, non distended, normal bowel sounds  SKIN: Dry, intact, No rashes or lesions  MUSCULOSKELETAL: Has BL lower extremity edema.  NEURO:  no tremor, sensation decreased in feet BL,    POCT Blood Glucose.: 315 mg/dL (10-23-22 @ 08:33)  POCT Blood Glucose.: 363 mg/dL (10-23-22 @ 00:15)  POCT Blood Glucose.: 375 mg/dL (10-22-22 @ 22:41)  POCT Blood Glucose.: 261 mg/dL (10-22-22 @ 12:13)                            9.4    9.49  )-----------( 268      ( 23 Oct 2022 14:08 )             30.0       10-22    133<L>  |  91<L>  |  74<H>  ----------------------------<  282<H>  4.8   |  22  |  5.43<H>    eGFR: 11<L>    Ca    9.3      10-22    TPro  7.5  /  Alb  3.1<L>  /  TBili  0.4  /  DBili  x   /  AST  22  /  ALT  17  /  AlkPhos  214<H>  10-22      Thyroid Function Tests:              Radiology:

## 2022-10-23 NOTE — CONSULT NOTE ADULT - SUBJECTIVE AND OBJECTIVE BOX
HPI: Mr. Owens is a 62 year-old man with history of multiple medical issues including hypertension, diabetes mellitus, coronary artery diease, congestive heart failure with reduced EF, gout, and chronic kidney disease. He is s/p recent admission at [facility] for DKA. Since discharge home a few days ago, he has still been suffering from fever and generalized weakness. Per wife, he has been becoming progressively more lethargic and confused.    I see that Mr. Owens was discharged on Bumex 4mg po bid. He was seen by my partner Dr. Rocio Plata during the admission. HF Dr. Francesca Alvarez was assisting in his care as well.        PAST MEDICAL & SURGICAL HISTORY:  Diabetes Mellitus Type II, Uncontrolled  HTN  Dyslipidemia  DM Retinopathy - L eye surgery   CAD-stent  HFrEF-MEMS  Gout  CKD  Nephrolithiasis  GERD  BPH      Allergies  Seafood (Unknown)  shellfish (Unknown)    SOCIAL HISTORY:  Denies ETOh,Smoking,     FAMILY HISTORY:  Family history of cardiac disorder in father (Father)    REVIEW OF SYSTEMS:  CONSTITUTIONAL: (+)generalized weakness, (+)fever, (+)lethargy  EYES/ENT: No visual changes;  No vertigo or throat pain   NECK: No pain or stiffness  RESPIRATORY: No cough, wheezing, hemoptysis; No shortness of breath  CARDIOVASCULAR: No chest pain or palpitations  GASTROINTESTINAL: No abdominal or epigastric pain. No nausea, vomiting, or hematemesis; No diarrhea or constipation. No melena or hematochezia.  GENITOURINARY: No dysuria, frequency or hematuria  NEUROLOGICAL: (+)AMS  SKIN: No itching, burning, rashes, or lesions   All other review of systems is negative unless indicated above.    VITAL:  T(C): , Max: 39.2 (10-22-22 @ 12:34)  T(F): , Max: 102.6 (10-22-22 @ 12:34)  HR: 81 (10-23-22 @ 08:21)  BP: 117/71 (10-23-22 @ 08:21)  BP(mean): 87 (10-23-22 @ 08:21)  RR: 16 (10-23-22 @ 08:21)  SpO2: 98% (10-23-22 @ 08:21)  Wt(kg): --    PHYSICAL EXAM:  Constitutional: NAD, Alert  HEENT: NCAT, MMM  Neck: Supple, No JVD  Respiratory: CTA-b/l  Cardiovascular: RRR s1s2, no m/r/g  Gastrointestinal: BS+, soft, NT/ND  Extremities: No peripheral edema b/l  Neurological: no focal deficits; strength grossly intact  Back: no CVAT b/l  Skin: No rashes, no nevi    LABS:                        11.0   12.01 )-----------( 325      ( 22 Oct 2022 12:41 )             34.6     Na(133)/K(4.8)/Cl(91)/HCO3(22)/BUN(74)/Cr(5.43)Glu(282)/Ca(9.3)/Mg(--)/PO4(--)    10-22 @ 12:41    (10/19/22) - BUN/creatinine: 66/4.74  (2021) - BUN/creatinine: 86/4.32    Urinalysis Basic - ( 22 Oct 2022 12:47 )  Color: Light Yellow / Appearance: Clear / S.013 / pH: x  Gluc: x / Ketone: Trace  / Bili: Negative / Urobili: Negative   Blood: x / Protein: 300 mg/dL / Nitrite: Negative   Leuk Esterase: Negative / RBC: 11 /hpf / WBC 6 /HPF   Sq Epi: x / Non Sq Epi: 5 /hpf / Bacteria: Negative      IMAGING:  < from: CT Abdomen and Pelvis No Cont (10.22.22 @ 15:40) >  KIDNEYS/URETERS: Within normal limits.  BLADDER: Mild bladder wall thickening.      ASSESSMENT:  (1)CKD - stage 5 - diabetic nephropathy  (2)LEYDA - prerenally mediated  (3)ID - on empiric Zosyn  (4)Fatigue/weakness - likely at least in part from uremia. Given how difficult it is at this point to optimize his volume status, it appears that chronic HD may need to be initiated this admission    RECOMMEND:  (1)No diuretics for now  (2)Gentle IVF - 100cc/h x 10h  (3)Urine: lytes, creatinine  (4)Dose new meds for GFR <10  (5)Will look to d/w patient/family regarding HD    Thank you for involving [facility] in this patient's care.    With warm regards,    Timmy Anguiano MD   [facility]  Office: [phone]  Cell: [phone]           HPI: Mr. Owens is a 62 year-old man with history of multiple medical issues including hypertension, diabetes mellitus, coronary artery diease, congestive heart failure with reduced EF, gout, and chronic kidney disease. He is s/p recent admission at [facility] for DKA. Since discharge home a few days ago, he has still been suffering from fever and generalized weakness. Per wife, he has been becoming progressively more lethargic and confused.    I see that Mr. Owens was discharged on Bumex 4mg po bid. He was seen by my partner Dr. Rocio Plata during the admission. HF Dr. Francesca Alvarez was assisting in his care as well.    History limited from patient (delirium/dementia - ?) Mr. Owens states that his outside nephrologist is Dr. Silvestre (?). Asks that we not touch his left arm, but cannot allude to why. Denies notable urinary changes of late. Family and friend are at bedside but are unable to assist in providing history.      PAST MEDICAL & SURGICAL HISTORY:  Diabetes Mellitus Type II, Uncontrolled  HTN  Dyslipidemia  DM Retinopathy - L eye surgery   CAD-stent  HFrEF-MEMS  Gout  CKD  Nephrolithiasis  GERD  BPH      Allergies  Seafood (Unknown)  shellfish (Unknown)    SOCIAL HISTORY:  Denies ETOh,Smoking,     FAMILY HISTORY:  Family history of cardiac disorder in father (Father)    REVIEW OF SYSTEMS:  CONSTITUTIONAL: (+)generalized weakness, (+)fever, (+)lethargy  EYES/ENT: No visual changes;  No vertigo or throat pain   NECK: No pain or stiffness  RESPIRATORY: No cough, wheezing, hemoptysis; No shortness of breath  CARDIOVASCULAR: No chest pain or palpitations  GASTROINTESTINAL: No abdominal or epigastric pain. No nausea, vomiting, or hematemesis; No diarrhea or constipation. No melena or hematochezia.  GENITOURINARY: No dysuria, frequency or hematuria  NEUROLOGICAL: (+)AMS  SKIN: No itching, burning, rashes, or lesions   All other review of systems is negative unless indicated above.    VITAL:  T(C): , Max: 39.2 (10-22-22 @ 12:34)  T(F): , Max: 102.6 (10-22-22 @ 12:34)  HR: 81 (10-23-22 @ 08:21)  BP: 117/71 (10-23-22 @ 08:21)  BP(mean): 87 (10-23-22 @ 08:21)  RR: 16 (10-23-22 @ 08:21)  SpO2: 98% (10-23-22 @ 08:21)  Wt(kg): --    PHYSICAL EXAM:  Constitutional: lethargic, mildly confused  HEENT: NCAT, DMM  Neck: Supple, No JVD  Respiratory: CTA-b/l  Cardiovascular: RRR s1s2, no m/r/g  Gastrointestinal: BS+, soft, NT/ND  Extremities: No peripheral edema b/l  Neurological: left arm contracted at present/increased tone.   Back: no CVAT b/l  Skin: No rashes, no nevi    LABS:                        11.0   12.01 )-----------( 325      ( 22 Oct 2022 12:41 )             34.6     Na(133)/K(4.8)/Cl(91)/HCO3(22)/BUN(74)/Cr(5.43)Glu(282)/Ca(9.3)/Mg(--)/PO4(--)    10-22 @ 12:41    (10/19/22) - BUN/creatinine: 66/4.74  (2021) - BUN/creatinine: 86/4.32    Urinalysis Basic - ( 22 Oct 2022 12:47 )  Color: Light Yellow / Appearance: Clear / S.013 / pH: x  Gluc: x / Ketone: Trace  / Bili: Negative / Urobili: Negative   Blood: x / Protein: 300 mg/dL / Nitrite: Negative   Leuk Esterase: Negative / RBC: 11 /hpf / WBC 6 /HPF   Sq Epi: x / Non Sq Epi: 5 /hpf / Bacteria: Negative      IMAGING:  < from: CT Abdomen and Pelvis No Cont (10.22.22 @ 15:40) >  KIDNEYS/URETERS: Within normal limits.  BLADDER: Mild bladder wall thickening.      ASSESSMENT:  (1)CKD - stage 5 - diabetic nephropathy  (2)LEYDA - prerenally mediated  (3)ID - on empiric Zosyn  (4)Fatigue/weakness - likely at least in part from uremia. Given how difficult it is at this point to optimize his volume status, it appears that chronic HD may need to be initiated this admission. No urgency for now, however.    RECOMMEND:  (1)No diuretics for now  (2)Gentle IVF - 100cc/h x 10h  (3)Urine: lytes, creatinine  (4)Dose new meds for GFR <10  (5)Will look to d/w patient/family regarding HD if clinical status does not improve with abx/IVF    Thank you for involving [facility] in this patient's care.    With warm regards,    Timmy Anguiano MD   [facility]  Office: [phone]  Cell: [phone]

## 2022-10-23 NOTE — PROGRESS NOTE ADULT - ASSESSMENT
Assessment  DMT2: 62y Male with DM T2 with hyperglycemia admitted with weakness, patient was on insulin at home, now on insulin coverage, blood sugars running high, no hypoglycemic episode,  eating meals, compliant with low carb diet.  CAD: on medications, monitored, asymptomatic.  HTN: On antihypertensive medications, monitored, asymptomatic.  CKD:  labs, chart reviewed.                  Rubén Escoto MD  Cell:  [phone]  Office: [phone]    Rachel SHORT  Cell:   Office: [phone]

## 2022-10-23 NOTE — PROGRESS NOTE ADULT - SUBJECTIVE AND OBJECTIVE BOX
afberile    REVIEW OF SYSTEMS:  GEN: no fever,    no chills  RESP: no SOB,   no cough  CVS: no chest pain,   no palpitations  GI: no abdominal pain,   no nausea,   no vomiting,   no constipation,   no diarrhea  : no dysuria,   no frequency  NEURO: no headache,   no dizziness  PSYCH: no depression,   not anxious  Derm : no rash    MEDICATIONS  (STANDING):  allopurinol 100 milliGRAM(s) Oral daily  aspirin enteric coated 81 milliGRAM(s) Oral daily  atorvastatin 80 milliGRAM(s) Oral at bedtime  carvedilol 25 milliGRAM(s) Oral every 12 hours  dextrose 5%. 1000 milliLiter(s) (50 mL/Hr) IV Continuous <Continuous>  dextrose 5%. 1000 milliLiter(s) (100 mL/Hr) IV Continuous <Continuous>  dextrose 50% Injectable 25 Gram(s) IV Push once  dextrose 50% Injectable 12.5 Gram(s) IV Push once  dextrose 50% Injectable 25 Gram(s) IV Push once  dextrose Oral Gel 15 Gram(s) Oral once  gabapentin 300 milliGRAM(s) Oral two times a day  glucagon  Injectable 1 milliGRAM(s) IntraMuscular once  heparin   Injectable 5000 Unit(s) SubCutaneous every 12 hours  insulin glargine Injectable (LANTUS) 18 Unit(s) SubCutaneous at bedtime  insulin lispro (ADMELOG) corrective regimen sliding scale   SubCutaneous three times a day before meals  insulin lispro (ADMELOG) corrective regimen sliding scale   SubCutaneous at bedtime  insulin lispro Injectable (ADMELOG) 7 Unit(s) SubCutaneous three times a day before meals  piperacillin/tazobactam IVPB.. 3.375 Gram(s) IV Intermittent every 12 hours  tamsulosin 0.4 milliGRAM(s) Oral at bedtime    MEDICATIONS  (PRN):      Vital Signs Last 24 Hrs  T(C): 37.4 (23 Oct 2022 08:21), Max: 39.2 (22 Oct 2022 12:34)  T(F): 99.3 (23 Oct 2022 08:21), Max: 102.6 (22 Oct 2022 12:34)  HR: 81 (23 Oct 2022 08:21) (81 - 112)  BP: 117/71 (23 Oct 2022 08:21) (114/75 - 166/87)  BP(mean): 87 (23 Oct 2022 08:21) (87 - 87)  RR: 16 (23 Oct 2022 08:21) (16 - 22)  SpO2: 98% (23 Oct 2022 08:21) (91% - 100%)    Parameters below as of 23 Oct 2022 08:21  Patient On (Oxygen Delivery Method): nasal cannula  O2 Flow (L/min): 2    CAPILLARY BLOOD GLUCOSE      POCT Blood Glucose.: 315 mg/dL (23 Oct 2022 08:33)  POCT Blood Glucose.: 363 mg/dL (23 Oct 2022 00:15)  POCT Blood Glucose.: 375 mg/dL (22 Oct 2022 22:41)  POCT Blood Glucose.: 261 mg/dL (22 Oct 2022 12:13)    I&O's Summary    22 Oct 2022 07:01  -  23 Oct 2022 07:00  --------------------------------------------------------  IN: 460 mL / OUT: 0 mL / NET: 460 mL        PHYSICAL EXAM:  HEAD:  Atraumatic, Normocephalic  NECK: Supple, No   JVD  CHEST/LUNG:   no     rales,     no,    rhonchi  HEART: Regular rate and rhythm;         murmur  ABDOMEN: Soft, Nontender, ;   EXTREMITIES:      no  edema  NEUROLOGY:  alert    LABS:                        11.0   12.01 )-----------( 325      ( 22 Oct 2022 12:41 )             34.6     10    133<L>  |  91<L>  |  74<H>  ----------------------------<  282<H>  4.8   |  22  |  5.43<H>    Ca    9.3      22 Oct 2022 12:41    TPro  7.5  /  Alb  3.1<L>  /  TBili  0.4  /  DBili  x   /  AST  22  /  ALT  17  /  AlkPhos  214<H>  10-22    PT/INR - ( 22 Oct 2022 12:41 )   PT: 12.8 sec;   INR: 1.11 ratio         PTT - ( 22 Oct 2022 12:41 )  PTT:33.0 sec      Urinalysis Basic - ( 22 Oct 2022 12:47 )    Color: Light Yellow / Appearance: Clear / S.013 / pH: x  Gluc: x / Ketone: Trace  / Bili: Negative / Urobili: Negative   Blood: x / Protein: 300 mg/dL / Nitrite: Negative   Leuk Esterase: Negative / RBC: 11 /hpf / WBC 6 /HPF   Sq Epi: x / Non Sq Epi: 5 /hpf / Bacteria: Negative          10-22 @ 12:32  5.9  57              Consultant(s) Notes Reviewed:      Care Discussed with Consultants/Other Providers:

## 2022-10-23 NOTE — CONSULT NOTE ADULT - SUBJECTIVE AND OBJECTIVE BOX
HPI:   Patient is a 62y male with dm poorly controlled due to poor compliance, chf/mems device, gout, ckd/nephrotic range , cad/stent who was discharged from  on 10/20 after a week long stay for hyperglycemia, nonketotic state with clau. He went home with a temp of 100.2 and was unable to walk. Wife reports he has been confused, complaining of pain all over, very stiff since coming home with ongoing fever hence brought him here yesterday. Temp was 102 on arrival. He thinks he is in Renate and he complains of pain all over the body. I cannot get any more information from him. NO report of vomiting or diarrhea.     REVIEW OF SYSTEMS:  All other review of systems negative (Comprehensive ROS)    PAST MEDICAL & SURGICAL HISTORY:  Diabetes Mellitus Type II, Uncontrolled      Dyslipidemia      s/p Angioplasty with Stent      HTN (hypertension)      Gout      Diabetic retinopathy      GERD (gastroesophageal reflux disease)      Nephrolithiasis      Vitamin D deficiency      Hepatitis      CKD (chronic kidney disease)      Ischemic cardiomyopathy      ASHD (arteriosclerotic heart disease)      Pulmonary hypertension      Myocardial infarction      CAD (coronary artery disease)      BPH (benign prostatic hyperplasia)      Retinal Hemorrhage      S/P coronary artery stent placement   TRICIA to Diag ; 2010  LAD; 11 ATOM liberte Diag; 5/15/2017  TRICIA to 1st diag; 17 PCI with laser and high pressure balloon      History of eye surgery  left eye      H/O lithotripsy      Diabetes with retinopathy  S/P PPV/PRP/GAS  OD 17 for viterous hemorrhage      CHF with cardiomyopathy  Insertion of Cardiomems monitor      Stented coronary artery          Allergies    No Known Drug Allergies  Seafood (Unknown)  shellfish (Unknown)    Intolerances        Antimicrobials Day #  :1  meropenem  IVPB 1000 milliGRAM(s) IV Intermittent every 12 hours  vancomycin  IVPB 1000 milliGRAM(s) IV Intermittent once    Other Medications:  allopurinol 100 milliGRAM(s) Oral daily  aspirin enteric coated 81 milliGRAM(s) Oral daily  atorvastatin 80 milliGRAM(s) Oral at bedtime  carvedilol 25 milliGRAM(s) Oral every 12 hours  colchicine 0.3 milliGRAM(s) Oral every 48 hours  dextrose 5%. 1000 milliLiter(s) IV Continuous <Continuous>  dextrose 5%. 1000 milliLiter(s) IV Continuous <Continuous>  dextrose 50% Injectable 25 Gram(s) IV Push once  dextrose 50% Injectable 12.5 Gram(s) IV Push once  dextrose 50% Injectable 25 Gram(s) IV Push once  dextrose Oral Gel 15 Gram(s) Oral once  gabapentin 300 milliGRAM(s) Oral two times a day  glucagon  Injectable 1 milliGRAM(s) IntraMuscular once  heparin   Injectable 5000 Unit(s) SubCutaneous every 12 hours  insulin glargine Injectable (LANTUS) 18 Unit(s) SubCutaneous at bedtime  insulin lispro (ADMELOG) corrective regimen sliding scale   SubCutaneous three times a day before meals  insulin lispro (ADMELOG) corrective regimen sliding scale   SubCutaneous at bedtime  insulin lispro Injectable (ADMELOG) 7 Unit(s) SubCutaneous three times a day before meals  oxycodone    5 mG/acetaminophen 325 mG 1 Tablet(s) Oral every 12 hours PRN  sodium chloride 0.9%. 1000 milliLiter(s) IV Continuous <Continuous>  tamsulosin 0.4 milliGRAM(s) Oral at bedtime      FAMILY HISTORY:  Family history of cardiac disorder in father (Father)        SOCIAL HISTORY:  Smoking: [ ]Yes x[ ]No  ETOH: [ ]Yes [ ]xNo  Drug Use: [ ]Yes [ ]xNo   [ x] Single[ ]    T(F): 99.2 (10-23-22 @ 16:20), Max: 99.3 (10-23-22 @ 08:21)  HR: 88 (10-23-22 @ 16:20)  BP: 108/63 (10-23-22 @ 16:20)  RR: 17 (10-23-22 @ 16:20)  SpO2: 98% (10-23-22 @ 16:20)  Wt(kg): --    PHYSICAL EXAM:  General: lethargic in  acute distress when moves  Eyes:  anicteric, no conjunctival injection, no discharge  Oropharynx: no lesions or injection 	  Neck: supple, without adenopathy  Lungs: clear to auscultation  Heart: regular rate and rhythm; no murmur, rubs or gallops  Abdomen: soft, nondistended, nontender, without mass or organomegaly  Skin: no lesions  Extremities: both hands with red joints.   Neurologic: confused, says he is in renate, very stiff, resists movement  scrotum no swelling or tenderness    LAB RESULTS:                        9.4    9.49  )-----------( 268      ( 23 Oct 2022 14:08 )             30.0     10-22    133<L>  |  91<L>  |  74<H>  ----------------------------<  282<H>  4.8   |  22  |  5.43<H>    Ca    9.3      22 Oct 2022 12:41    TPro  7.5  /  Alb  3.1<L>  /  TBili  0.4  /  DBili  x   /  AST  22  /  ALT  17  /  AlkPhos  214<H>  10-22    LIVER FUNCTIONS - ( 22 Oct 2022 12:41 )  Alb: 3.1 g/dL / Pro: 7.5 g/dL / ALK PHOS: 214 U/L / ALT: 17 U/L / AST: 22 U/L / GGT: x           Urinalysis Basic - ( 22 Oct 2022 12:47 )    Color: Light Yellow / Appearance: Clear / S.013 / pH: x  Gluc: x / Ketone: Trace  / Bili: Negative / Urobili: Negative   Blood: x / Protein: 300 mg/dL / Nitrite: Negative   Leuk Esterase: Negative / RBC: 11 /hpf / WBC 6 /HPF   Sq Epi: x / Non Sq Epi: 5 /hpf / Bacteria: Negative        MICROBIOLOGY:  RECENT CULTURES:  10-22 @ 12:47 Clean Catch Clean Catch (Midstream)     No growth      10-22 @ 12:32 .Blood Blood-Peripheral     No growth to date.      10-22 @ 12:20 .Blood Blood-Peripheral     No growth to date.            RADIOLOGY REVIEWED:  < from: CT Abdomen and Pelvis No Cont (10.22.22 @ 15:40) >    ACC: [id number] EXAM:  CT ABDOMEN AND PELVIS                        ACC: [id number] EXAM:  CT CHEST                          PROCEDURE DATE:  10/22/2022          INTERPRETATION:  CLINICAL INFORMATION: Generalized weakness and altered   mental status    COMPARISON: CT chest 2018 and CT abdomen/pelvis 2012    CONTRAST/COMPLICATIONS:  IV Contrast: NONE  Oral Contrast: NONE  Complications: None reported at time of study completion    PROCEDURE:  CT of the Chest, Abdomen and Pelvis was performed.  Sagittal and coronal reformats were performed.    FINDINGS:  CHEST:  LUNGS AND LARGE AIRWAYS: Patent central airways. Bibasilar subsegmental   atelectasis.  PLEURA: No pleural effusion.  VESSELS: Aortic and coronary artery calcifications. Left pulmonary artery   monitoring device.  HEART: Heart size is normal. No pericardial effusion.  MEDIASTINUM AND MARGARETTE: No lymphadenopathy.  CHEST WALL AND LOWER NECK: Bilateral gynecomastia.    ABDOMEN AND PELVIS:  LIVER: Within normal limits.  BILE DUCTS:Normal caliber.  GALLBLADDER: Cholelithiasis.  SPLEEN: Within normal limits.  PANCREAS: Within normal limits.  ADRENALS: Within normal limits.  KIDNEYS/URETERS: Within normal limits.    BLADDER: Mild bladder wall thickening.  REPRODUCTIVE ORGANS: Prostate within normal limits.    BOWEL: No bowel obstruction. Appendix is normal.  PERITONEUM: No ascites.  VESSELS: Atherosclerotic changes.  RETROPERITONEUM/LYMPH NODES: No lymphadenopathy.  ABDOMINAL WALL: Within normal limits.  BONES: Within normal limits.    IMPRESSION:  Mild bladder wall thickening. Correlate with urinalysis.        --- End of Report ---        < end of copied text >          Impression:    61 y/o with ischemic cardiomyopathy, mems, stent, diabetic poorly controlled from noncompliance, gout, ckd,had stay at [facility] 10/12-20 for nonketotic hyperglycemic state and clau. He left with a temp of 100.2 and very weak. Wife reports he has been confused , unable to walk and c/o pain all over so came here. Exam is notable for fever on admit, gout attack in the hands and encephalopathy. ct cap unrevealing, labs with high wbc now improved and high cr. Could be  fever from gout with toxic met enceph but I do have concern for an infection including cns.   Recommendations:  will cover with vanco by level and meropenem  neurology consult  ct head urgent  f/u cultures  may need LP  check crypt ag  steroids for gout  further w/u and tx to be determined

## 2022-10-24 LAB
A1C WITH ESTIMATED AVERAGE GLUCOSE RESULT: 11.8 % — HIGH (ref 4–5.6)
ANION GAP SERPL CALC-SCNC: 22 MMOL/L — HIGH (ref 5–17)
BUN SERPL-MCNC: 102 MG/DL — HIGH (ref 7–23)
CALCIUM SERPL-MCNC: 8.8 MG/DL — SIGNIFICANT CHANGE UP (ref 8.4–10.5)
CHLORIDE SERPL-SCNC: 92 MMOL/L — LOW (ref 96–108)
CO2 SERPL-SCNC: 18 MMOL/L — LOW (ref 22–31)
CREAT SERPL-MCNC: 6.96 MG/DL — HIGH (ref 0.5–1.3)
CRYPTOC AG FLD QL: NEGATIVE — SIGNIFICANT CHANGE UP
EGFR: 8 ML/MIN/1.73M2 — LOW
ESTIMATED AVERAGE GLUCOSE: 292 MG/DL — HIGH (ref 68–114)
GLUCOSE BLDC GLUCOMTR-MCNC: 206 MG/DL — HIGH (ref 70–99)
GLUCOSE BLDC GLUCOMTR-MCNC: 355 MG/DL — HIGH (ref 70–99)
GLUCOSE BLDC GLUCOMTR-MCNC: 369 MG/DL — HIGH (ref 70–99)
GLUCOSE BLDC GLUCOMTR-MCNC: 397 MG/DL — HIGH (ref 70–99)
GLUCOSE BLDC GLUCOMTR-MCNC: 419 MG/DL — HIGH (ref 70–99)
GLUCOSE SERPL-MCNC: 374 MG/DL — HIGH (ref 70–99)
HCT VFR BLD CALC: 30.9 % — LOW (ref 39–50)
HGB BLD-MCNC: 9.8 G/DL — LOW (ref 13–17)
MCHC RBC-ENTMCNC: 28.6 PG — SIGNIFICANT CHANGE UP (ref 27–34)
MCHC RBC-ENTMCNC: 31.7 GM/DL — LOW (ref 32–36)
MCV RBC AUTO: 90.1 FL — SIGNIFICANT CHANGE UP (ref 80–100)
NRBC # BLD: 0 /100 WBCS — SIGNIFICANT CHANGE UP (ref 0–0)
PLATELET # BLD AUTO: 302 K/UL — SIGNIFICANT CHANGE UP (ref 150–400)
POTASSIUM SERPL-MCNC: 5.3 MMOL/L — SIGNIFICANT CHANGE UP (ref 3.5–5.3)
POTASSIUM SERPL-SCNC: 5.3 MMOL/L — SIGNIFICANT CHANGE UP (ref 3.5–5.3)
RBC # BLD: 3.43 M/UL — LOW (ref 4.2–5.8)
RBC # FLD: 14.3 % — SIGNIFICANT CHANGE UP (ref 10.3–14.5)
SODIUM SERPL-SCNC: 132 MMOL/L — LOW (ref 135–145)
WBC # BLD: 9.4 K/UL — SIGNIFICANT CHANGE UP (ref 3.8–10.5)
WBC # FLD AUTO: 9.4 K/UL — SIGNIFICANT CHANGE UP (ref 3.8–10.5)

## 2022-10-24 PROCEDURE — 93971 EXTREMITY STUDY: CPT | Mod: 26,RT

## 2022-10-24 RX ORDER — INSULIN GLARGINE 100 [IU]/ML
20 INJECTION, SOLUTION SUBCUTANEOUS AT BEDTIME
Refills: 0 | Status: DISCONTINUED | OUTPATIENT
Start: 2022-10-24 | End: 2022-10-25

## 2022-10-24 RX ORDER — INSULIN LISPRO 100/ML
8 VIAL (ML) SUBCUTANEOUS
Refills: 0 | Status: DISCONTINUED | OUTPATIENT
Start: 2022-10-24 | End: 2022-10-25

## 2022-10-24 RX ADMIN — Medication 5: at 08:46

## 2022-10-24 RX ADMIN — Medication 5: at 17:21

## 2022-10-24 RX ADMIN — MEROPENEM 100 MILLIGRAM(S): 1 INJECTION INTRAVENOUS at 05:23

## 2022-10-24 RX ADMIN — Medication 5: at 13:23

## 2022-10-24 RX ADMIN — Medication 81 MILLIGRAM(S): at 13:31

## 2022-10-24 RX ADMIN — Medication 4: at 22:15

## 2022-10-24 RX ADMIN — OXYCODONE AND ACETAMINOPHEN 1 TABLET(S): 5; 325 TABLET ORAL at 23:35

## 2022-10-24 RX ADMIN — Medication 100 MILLIGRAM(S): at 13:32

## 2022-10-24 RX ADMIN — CARVEDILOL PHOSPHATE 25 MILLIGRAM(S): 80 CAPSULE, EXTENDED RELEASE ORAL at 05:24

## 2022-10-24 RX ADMIN — Medication 20 MILLIGRAM(S): at 05:23

## 2022-10-24 RX ADMIN — MEROPENEM 100 MILLIGRAM(S): 1 INJECTION INTRAVENOUS at 17:24

## 2022-10-24 RX ADMIN — OXYCODONE AND ACETAMINOPHEN 1 TABLET(S): 5; 325 TABLET ORAL at 22:35

## 2022-10-24 RX ADMIN — Medication 7 UNIT(S): at 08:46

## 2022-10-24 RX ADMIN — INSULIN GLARGINE 20 UNIT(S): 100 INJECTION, SOLUTION SUBCUTANEOUS at 22:15

## 2022-10-24 RX ADMIN — ATORVASTATIN CALCIUM 80 MILLIGRAM(S): 80 TABLET, FILM COATED ORAL at 22:14

## 2022-10-24 RX ADMIN — TAMSULOSIN HYDROCHLORIDE 0.4 MILLIGRAM(S): 0.4 CAPSULE ORAL at 22:16

## 2022-10-24 RX ADMIN — HEPARIN SODIUM 5000 UNIT(S): 5000 INJECTION INTRAVENOUS; SUBCUTANEOUS at 17:21

## 2022-10-24 RX ADMIN — HEPARIN SODIUM 5000 UNIT(S): 5000 INJECTION INTRAVENOUS; SUBCUTANEOUS at 05:24

## 2022-10-24 RX ADMIN — Medication 7 UNIT(S): at 13:30

## 2022-10-24 RX ADMIN — GABAPENTIN 300 MILLIGRAM(S): 400 CAPSULE ORAL at 05:23

## 2022-10-24 NOTE — PROGRESS NOTE ADULT - ASSESSMENT
2 yom   hx of CKD,   s/p DKA, DM.     cardiomyopathy,  CAD w/ stents, HTN, gout     generalized  weakness.  with   c/c h/o b/l leg weakness/ and   b/l arm weakness, r > l      p/w days of generalized weakness and AMS    .Per wife, pt was just in [facility] for DKA.     d/c'd w/ fever and still weak. Since tuesday, pt becoming more lethargic and altered especially when he waking up in morning   glucose,  have been in the 200s since per cont BG monitor. Subjective fevers at home,    Question of if pt has heather compliant w/ short and long acting insulin as wife works nights and is not there to give all meds at home.    Unclear if pt has been falling at home but w/ bruising on his abdomen        *   pt  admitted  with fevers/ ams. / metabolic  encephalopathy   on  arrival, wbc  was12,000       follow  bcx/ ucx,     on iv    Zosyn        Ct  a/p,   ? cystitis/   ID eval [facility]    * CKD,  crt is 5,4/ ,  renal  dr dickerson      c/c  anemia     *   DM,  follow  fs     on lantus/  known to  nika talley     *  ef 40     *  c/c  weakness  of   limbs/  neuro d rajeev mccain    has  funcytional  quadriplegia/  CT ehad. 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 Correlate with urinalysis  --- End of Report ---  < end of copied text >      rad< from: Transthoracic Echocardiogram (12.07.21 @ 11:24) >  nclusions:  1. Normal mitral valve. Minimal mitral regurgitation.  2. Calcified trileaflet aortic valve with normal opening.  Minimal aortic regurgitation.  3. Mild segmental left ventricular systolic dysfunction.  The basal inferior, basal septum, distal septum, distal  inferior, and the apex are akinetic. Recommend limited  repeat imaging with intravenous echo contrast to better  visualize the apex. (Per sonographer, patient did not agree  to echo contrast during the study.)  4. Mild diastolic dysfunction (Stage I).  5. Normal right ventricular size and function.  *** No previous Echo exam.    < end of copied text >

## 2022-10-24 NOTE — PROGRESS NOTE ADULT - SUBJECTIVE AND OBJECTIVE BOX
afberile    REVIEW OF SYSTEMS:  GEN: no fever,    no chills  RESP: no SOB,   no cough  CVS: no chest pain,   no palpitations  GI: no abdominal pain,   no nausea,   no vomiting,   no constipation,   no diarrhea  : no dysuria,   no frequency  NEURO: no headache,   no dizziness  PSYCH: no depression,   not anxious  Derm : no rash    MEDICATIONS  (STANDING):  allopurinol 100 milliGRAM(s) Oral daily  aspirin enteric coated 81 milliGRAM(s) Oral daily  atorvastatin 80 milliGRAM(s) Oral at bedtime  carvedilol 25 milliGRAM(s) Oral every 12 hours  colchicine 0.3 milliGRAM(s) Oral every 48 hours  dextrose 5%. 1000 milliLiter(s) (50 mL/Hr) IV Continuous <Continuous>  dextrose 5%. 1000 milliLiter(s) (100 mL/Hr) IV Continuous <Continuous>  dextrose 50% Injectable 25 Gram(s) IV Push once  dextrose 50% Injectable 12.5 Gram(s) IV Push once  dextrose 50% Injectable 25 Gram(s) IV Push once  dextrose Oral Gel 15 Gram(s) Oral once  gabapentin 300 milliGRAM(s) Oral two times a day  glucagon  Injectable 1 milliGRAM(s) IntraMuscular once  heparin   Injectable 5000 Unit(s) SubCutaneous every 12 hours  insulin glargine Injectable (LANTUS) 18 Unit(s) SubCutaneous at bedtime  insulin lispro (ADMELOG) corrective regimen sliding scale   SubCutaneous three times a day before meals  insulin lispro (ADMELOG) corrective regimen sliding scale   SubCutaneous at bedtime  insulin lispro Injectable (ADMELOG) 7 Unit(s) SubCutaneous three times a day before meals  meropenem  IVPB 1000 milliGRAM(s) IV Intermittent every 12 hours  predniSONE   Tablet 20 milliGRAM(s) Oral daily  sodium chloride 0.9%. 1000 milliLiter(s) (100 mL/Hr) IV Continuous <Continuous>  tamsulosin 0.4 milliGRAM(s) Oral at bedtime    MEDICATIONS  (PRN):  oxycodone    5 mG/acetaminophen 325 mG 1 Tablet(s) Oral every 12 hours PRN Moderate Pain (4 - 6)      Vital Signs Last 24 Hrs  T(C): 37.2 (24 Oct 2022 08:25), Max: 37.3 (23 Oct 2022 16:20)  T(F): 98.9 (24 Oct 2022 08:25), Max: 99.2 (23 Oct 2022 16:20)  HR: 84 (24 Oct 2022 08:25) (83 - 89)  BP: 131/70 (24 Oct 2022 08:25) (108/63 - 136/81)  BP(mean): --  RR: 18 (24 Oct 2022 08:25) (17 - 18)  SpO2: 95% (24 Oct 2022 08:25) (95% - 98%)    Parameters below as of 24 Oct 2022 08:25  Patient On (Oxygen Delivery Method): room air      CAPILLARY BLOOD GLUCOSE      POCT Blood Glucose.: 369 mg/dL (24 Oct 2022 08:23)  POCT Blood Glucose.: 262 mg/dL (23 Oct 2022 21:58)  POCT Blood Glucose.: 178 mg/dL (23 Oct 2022 17:00)    I&O's Summary    23 Oct 2022 07:01  -  24 Oct 2022 07:00  --------------------------------------------------------  IN: 2200 mL / OUT: 0 mL / NET: 2200 mL        PHYSICAL EXAM:  HEAD:  Atraumatic, Normocephalic  NECK: Supple, No   JVD  CHEST/LUNG:   no     rales,     no,    rhonchi  HEART: Regular rate and rhythm;         murmur  ABDOMEN: Soft, Nontender, ;   EXTREMITIES:        edema  NEUROLOGY:  alert    LABS:                        9.4    9.49  )-----------( 268      ( 23 Oct 2022 14:08 )             30.0     10-22    133<L>  |  91<L>  |  74<H>  ----------------------------<  282<H>  4.8   |  22  |  5.43<H>    Ca    9.3      22 Oct 2022 12:41    TPro  7.5  /  Alb  3.1<L>  /  TBili  0.4  /  DBili  x   /  AST  22  /  ALT  17  /  AlkPhos  214<H>  10-22    PT/INR - ( 22 Oct 2022 12:41 )   PT: 12.8 sec;   INR: 1.11 ratio         PTT - ( 22 Oct 2022 12:41 )  PTT:33.0 sec      Urinalysis Basic - ( 22 Oct 2022 12:47 )    Color: Light Yellow / Appearance: Clear / S.013 / pH: x  Gluc: x / Ketone: Trace  / Bili: Negative / Urobili: Negative   Blood: x / Protein: 300 mg/dL / Nitrite: Negative   Leuk Esterase: Negative / RBC: 11 /hpf / WBC 6 /HPF   Sq Epi: x / Non Sq Epi: 5 /hpf / Bacteria: Negative          10-22 @ 12:32  5.9  57              Consultant(s) Notes Reviewed:      Care Discussed with Consultants/Other Providers:

## 2022-10-24 NOTE — CONSULT NOTE ADULT - ASSESSMENT
62 yom hx of CKD, DKA, DMtype2, cardiomyopathy w/ stents, HTN, gout p/w days of generalized weakness and AMS. Per wife, pt was just in [facility] for DKA. Was d/c'd w/ fever and still weak. Since tuesday, pt becoming more lethargic and altered especially when he waking up in morning. BG have been in the 200s since per cont BG monitor. Subjective fevers at home, wife feels that his abd is larger than it was before d/c from [facility]. Question of if pt has heather compliant w/ short and long acting insulin as wife works nights and is not there to give all meds at home. Unclear if pt has been falling at home but w/ bruising on his abd.   pt with hx of ashd, chf, cardiomyopathy, dm, s/p CARDIOMEM fu at [facility] with change of mental stratus and sob.  ct scan noted , no active chf  will adjust cardiac meds  will call chf team at [facility] to get Cardiomem readings  beta blocker/ hydralazine and Nitrate  ckd, renal follow up  check pro bnp  dvt prophylaxis
62 yomhx of CKD,   s/p DKA, DM. cardiomyopathy,  CAD w/ stents, HTN, gout   p/w days of generalized weakness and AMS. states weakness worse than baseline. On Pe cognitive deficits Right Nl flattening (? from chronic left Bg infarct)  distal atrophy, propioceptive loss trace reflexes with equivocal toes withdrawal vs upgoing CTH as above    Impression: chronic advanced Neuropathy r/o superimposed central process    MRI Brain and C-spine w/o con  B12. folate, TSH methylmalonic acid  Outpt EMG

## 2022-10-24 NOTE — PROGRESS NOTE ADULT - SUBJECTIVE AND OBJECTIVE BOX
CARDIOLOGY     PROGRESS  NOTE   ________________________________________________    CHIEF COMPLAINT:Patient is a 62y old  Male who presents with a chief complaint of fevers (23 Oct 2022 17:53)  comfortable.  	  REVIEW OF SYSTEMS:  CONSTITUTIONAL: No fever, weight loss, or fatigue  EYES: No eye pain, visual disturbances, or discharge  ENT:  No difficulty hearing, tinnitus, vertigo; No sinus or throat pain  NECK: No pain or stiffness  RESPIRATORY: No cough, wheezing, chills or hemoptysis; No Shortness of Breath  CARDIOVASCULAR: No chest pain, palpitations, passing out, dizziness, or leg swelling  GASTROINTESTINAL: No abdominal or epigastric pain. No nausea, vomiting, or hematemesis; No diarrhea or constipation. No melena or hematochezia.  GENITOURINARY: No dysuria, frequency, hematuria, or incontinence  NEUROLOGICAL: No headaches, memory loss, loss of strength, numbness, or tremors  SKIN: No itching, burning, rashes, or lesions   LYMPH Nodes: No enlarged glands  ENDOCRINE: No heat or cold intolerance; No hair loss  MUSCULOSKELETAL: No joint pain or swelling; No muscle, back, or extremity pain  PSYCHIATRIC: No depression, anxiety, mood swings, or difficulty sleeping  HEME/LYMPH: No easy bruising, or bleeding gums  ALLERGY AND IMMUNOLOGIC: No hives or eczema	    [ ] All others negative	  x[ ] Unable to obtain    PHYSICAL EXAM:  T(C): 36.7 (10-24-22 @ 04:11), Max: 37.4 (10-23-22 @ 08:21)  HR: 86 (10-24-22 @ 04:11) (81 - 89)  BP: 130/72 (10-24-22 @ 04:11) (108/63 - 136/81)  RR: 18 (10-24-22 @ 04:11) (16 - 18)  SpO2: 97% (10-24-22 @ 04:11) (96% - 98%)  Wt(kg): --  I&O's Summary    22 Oct 2022 07:01  -  23 Oct 2022 07:00  --------------------------------------------------------  IN: 460 mL / OUT: 0 mL / NET: 460 mL    23 Oct 2022 07:01  -  24 Oct 2022 06:49  --------------------------------------------------------  IN: 2200 mL / OUT: 0 mL / NET: 2200 mL        Appearance: Normal	  HEENT:   Normal oral mucosa, PERRL, EOMI	  Lymphatic: No lymphadenopathy  Cardiovascular: Normal S1 S2, No JVD, + murmurs, + 1 edema  Respiratory:rhonchi  Psychiatry: A & O x 3, Mood & affect appropriate  Gastrointestinal:  Soft, Non-tender, + BS	  Skin: No rashes, No ecchymoses, No cyanosis	  Extremities: Normal range of motion, No clubbing, cyanosis , +1 edema  Vascular: Peripheral pulses palpable 2+ bilaterally    MEDICATIONS  (STANDING):  allopurinol 100 milliGRAM(s) Oral daily  aspirin enteric coated 81 milliGRAM(s) Oral daily  atorvastatin 80 milliGRAM(s) Oral at bedtime  carvedilol 25 milliGRAM(s) Oral every 12 hours  colchicine 0.3 milliGRAM(s) Oral every 48 hours  dextrose 5%. 1000 milliLiter(s) (50 mL/Hr) IV Continuous <Continuous>  dextrose 5%. 1000 milliLiter(s) (100 mL/Hr) IV Continuous <Continuous>  dextrose 50% Injectable 12.5 Gram(s) IV Push once  dextrose 50% Injectable 25 Gram(s) IV Push once  dextrose 50% Injectable 25 Gram(s) IV Push once  dextrose Oral Gel 15 Gram(s) Oral once  gabapentin 300 milliGRAM(s) Oral two times a day  glucagon  Injectable 1 milliGRAM(s) IntraMuscular once  heparin   Injectable 5000 Unit(s) SubCutaneous every 12 hours  insulin glargine Injectable (LANTUS) 18 Unit(s) SubCutaneous at bedtime  insulin lispro (ADMELOG) corrective regimen sliding scale   SubCutaneous three times a day before meals  insulin lispro (ADMELOG) corrective regimen sliding scale   SubCutaneous at bedtime  insulin lispro Injectable (ADMELOG) 7 Unit(s) SubCutaneous three times a day before meals  meropenem  IVPB 1000 milliGRAM(s) IV Intermittent every 12 hours  predniSONE   Tablet 20 milliGRAM(s) Oral daily  sodium chloride 0.9%. 1000 milliLiter(s) (100 mL/Hr) IV Continuous <Continuous>  tamsulosin 0.4 milliGRAM(s) Oral at bedtime      TELEMETRY: 	    ECG:  	  RADIOLOGY:  OTHER: 	  	  LABS:	 	    CARDIAC MARKERS:                                9.4    9.49  )-----------( 268      ( 23 Oct 2022 14:08 )             30.0     10-22    133<L>  |  91<L>  |  74<H>  ----------------------------<  282<H>  4.8   |  22  |  5.43<H>    Ca    9.3      22 Oct 2022 12:41    TPro  7.5  /  Alb  3.1<L>  /  TBili  0.4  /  DBili  x   /  AST  22  /  ALT  17  /  AlkPhos  214<H>  10-22    proBNP:   Lipid Profile:   HgA1c:   TSH:   PT/INR - ( 22 Oct 2022 12:41 )   PT: 12.8 sec;   INR: 1.11 ratio         PTT - ( 22 Oct 2022 12:41 )  PTT:33.0 sec    (1)No diuretics for now  (2)Gentle IVF - 100cc/h x 10h  (3)Urine: lytes, creatinine  (4)Dose new meds for GFR <10  (5)Will look to d/w patient/family regarding HD if clinical status does not improve with abx/IVF    will cover with vanco by level and meropenem  neurology consult  ct head urgent  f/u cultures  may need LP  check crypt ag  steroids for gout  further w/u and tx to be determined    < from: CT Head No Cont (10.23.22 @ 20:36) >  IMPRESSION:   Mild periventricular white matter ischemia with old lacunar   infarction LEFT basal ganglia. 4.1 x 2.4 cm arachnoid cyst again noted in   the anterior LEFT middle cranial fossa.          Assessment and plan  ---------------------------  62 yom hx of CKD, DKA, DMtype2, cardiomyopathy w/ stents, HTN, gout p/w days of generalized weakness and AMS. Per wife, pt was just in [facility] for DKA. Was d/c'd w/ fever and still weak. Since tuesday, pt becoming more lethargic and altered especially when he waking up in morning. BG have been in the 200s since per cont BG monitor. Subjective fevers at home, wife feels that his abd is larger than it was before d/c from [facility]. Question of if pt has heather compliant w/ short and long acting insulin as wife works nights and is not there to give all meds at home. Unclear if pt has been falling at home but w/ bruising on his abd.   pt with hx of ashd, chf, cardiomyopathy, dm, s/p CARDIOMEM fu at [facility] with change of mental stratus and sob.  ct scan noted , no active chf  will adjust cardiac meds  will call chf team at [facility] to get Cardiomem readings  beta blocker/ hydralazine and Nitrate  ckd, renal follow up  check pro bnp  dvt prophylaxis  pt with known hx of cad, last stress test  in 07/2021, no ischemia with scar in mary septal and apical area  ct chest abdomen and pelvis noted, no pleural effusion or sign of chs  renal increased renal function last admission CR 4.8  continue asa daily /coreg  will add hydralazine and nitrate as tolerated  renal /ID noted  ?metabolic encephalopathy, but pt responds to the question appropriately  avoid excess fluid      	                    CARDIOLOGY     PROGRESS  NOTE   ________________________________________________    CHIEF COMPLAINT:Patient is a 62y old  Male who presents with a chief complaint of fevers (23 Oct 2022 17:53)  comfortable.  	  REVIEW OF SYSTEMS:  CONSTITUTIONAL: No fever, weight loss, or fatigue  EYES: No eye pain, visual disturbances, or discharge  ENT:  No difficulty hearing, tinnitus, vertigo; No sinus or throat pain  NECK: No pain or stiffness  RESPIRATORY: No cough, wheezing, chills or hemoptysis; No Shortness of Breath  CARDIOVASCULAR: No chest pain, palpitations, passing out, dizziness, or leg swelling  GASTROINTESTINAL: No abdominal or epigastric pain. No nausea, vomiting, or hematemesis; No diarrhea or constipation. No melena or hematochezia.  GENITOURINARY: No dysuria, frequency, hematuria, or incontinence  NEUROLOGICAL: No headaches, memory loss, loss of strength, numbness, or tremors  SKIN: No itching, burning, rashes, or lesions   LYMPH Nodes: No enlarged glands  ENDOCRINE: No heat or cold intolerance; No hair loss  MUSCULOSKELETAL: No joint pain or swelling; No muscle, back, or extremity pain  PSYCHIATRIC: No depression, anxiety, mood swings, or difficulty sleeping  HEME/LYMPH: No easy bruising, or bleeding gums  ALLERGY AND IMMUNOLOGIC: No hives or eczema	    [ ] All others negative	  x[ ] Unable to obtain    PHYSICAL EXAM:  T(C): 36.7 (10-24-22 @ 04:11), Max: 37.4 (10-23-22 @ 08:21)  HR: 86 (10-24-22 @ 04:11) (81 - 89)  BP: 130/72 (10-24-22 @ 04:11) (108/63 - 136/81)  RR: 18 (10-24-22 @ 04:11) (16 - 18)  SpO2: 97% (10-24-22 @ 04:11) (96% - 98%)  Wt(kg): --  I&O's Summary    22 Oct 2022 07:01  -  23 Oct 2022 07:00  --------------------------------------------------------  IN: 460 mL / OUT: 0 mL / NET: 460 mL    23 Oct 2022 07:01  -  24 Oct 2022 06:49  --------------------------------------------------------  IN: 2200 mL / OUT: 0 mL / NET: 2200 mL        Appearance: Normal	  HEENT:   Normal oral mucosa, PERRL, EOMI	  Lymphatic: No lymphadenopathy  Cardiovascular: Normal S1 S2, No JVD, + murmurs, + 1 edema  Respiratory:rhonchi  Psychiatry: A & O x 3, Mood & affect appropriate  Gastrointestinal:  Soft, Non-tender, + BS	  Skin: No rashes, No ecchymoses, No cyanosis	  Extremities: Normal range of motion, No clubbing, cyanosis , +1 edema  Vascular: Peripheral pulses palpable 2+ bilaterally    MEDICATIONS  (STANDING):  allopurinol 100 milliGRAM(s) Oral daily  aspirin enteric coated 81 milliGRAM(s) Oral daily  atorvastatin 80 milliGRAM(s) Oral at bedtime  carvedilol 25 milliGRAM(s) Oral every 12 hours  colchicine 0.3 milliGRAM(s) Oral every 48 hours  dextrose 5%. 1000 milliLiter(s) (50 mL/Hr) IV Continuous <Continuous>  dextrose 5%. 1000 milliLiter(s) (100 mL/Hr) IV Continuous <Continuous>  dextrose 50% Injectable 12.5 Gram(s) IV Push once  dextrose 50% Injectable 25 Gram(s) IV Push once  dextrose 50% Injectable 25 Gram(s) IV Push once  dextrose Oral Gel 15 Gram(s) Oral once  gabapentin 300 milliGRAM(s) Oral two times a day  glucagon  Injectable 1 milliGRAM(s) IntraMuscular once  heparin   Injectable 5000 Unit(s) SubCutaneous every 12 hours  insulin glargine Injectable (LANTUS) 18 Unit(s) SubCutaneous at bedtime  insulin lispro (ADMELOG) corrective regimen sliding scale   SubCutaneous three times a day before meals  insulin lispro (ADMELOG) corrective regimen sliding scale   SubCutaneous at bedtime  insulin lispro Injectable (ADMELOG) 7 Unit(s) SubCutaneous three times a day before meals  meropenem  IVPB 1000 milliGRAM(s) IV Intermittent every 12 hours  predniSONE   Tablet 20 milliGRAM(s) Oral daily  sodium chloride 0.9%. 1000 milliLiter(s) (100 mL/Hr) IV Continuous <Continuous>  tamsulosin 0.4 milliGRAM(s) Oral at bedtime      TELEMETRY: 	    ECG:  	  RADIOLOGY:  OTHER: 	  	  LABS:	 	    CARDIAC MARKERS:                                9.4    9.49  )-----------( 268      ( 23 Oct 2022 14:08 )             30.0     10-22    133<L>  |  91<L>  |  74<H>  ----------------------------<  282<H>  4.8   |  22  |  5.43<H>    Ca    9.3      22 Oct 2022 12:41    TPro  7.5  /  Alb  3.1<L>  /  TBili  0.4  /  DBili  x   /  AST  22  /  ALT  17  /  AlkPhos  214<H>  10-22    proBNP:   Lipid Profile:   HgA1c:   TSH:   PT/INR - ( 22 Oct 2022 12:41 )   PT: 12.8 sec;   INR: 1.11 ratio         PTT - ( 22 Oct 2022 12:41 )  PTT:33.0 sec    (1)No diuretics for now  (2)Gentle IVF - 100cc/h x 10h  (3)Urine: lytes, creatinine  (4)Dose new meds for GFR <10  (5)Will look to d/w patient/family regarding HD if clinical status does not improve with abx/IVF    will cover with vanco by level and meropenem  neurology consult  ct head urgent  f/u cultures  may need LP  check crypt ag  steroids for gout  further w/u and tx to be determined    < from: CT Head No Cont (10.23.22 @ 20:36) >  IMPRESSION:   Mild periventricular white matter ischemia with old lacunar   infarction LEFT basal ganglia. 4.1 x 2.4 cm arachnoid cyst again noted in   the anterior LEFT middle cranial fossa.          Assessment and plan  ---------------------------  62 yom hx of CKD, DKA, DMtype2, cardiomyopathy w/ stents, HTN, gout p/w days of generalized weakness and AMS. Per wife, pt was just in [facility] for DKA. Was d/c'd w/ fever and still weak. Since tuesday, pt becoming more lethargic and altered especially when he waking up in morning. BG have been in the 200s since per cont BG monitor. Subjective fevers at home, wife feels that his abd is larger than it was before d/c from [facility]. Question of if pt has heather compliant w/ short and long acting insulin as wife works nights and is not there to give all meds at home. Unclear if pt has been falling at home but w/ bruising on his abd.   pt with hx of ashd, chf, cardiomyopathy, dm, s/p CARDIOMEM fu at [facility] with change of mental stratus and sob.  ct scan noted , no active chf  will adjust cardiac meds  will call chf team at [facility] to get Cardiomem readings  beta blocker/ hydralazine and Nitrate  ckd, renal follow up  check pro bnp  dvt prophylaxis  pt with known hx of cad, last stress test  in 07/2021, no ischemia with scar in mary septal and apical area  ct chest abdomen and pelvis noted, no pleural effusion or sign of chs  renal increased renal function last admission CR 4.8  continue asa daily /coreg  will add hydralazine and nitrate as tolerated  renal /ID noted  ?metabolic encephalopathy, but pt responds to the question appropriately, r sided weakness  avoid excess fluid

## 2022-10-24 NOTE — PROGRESS NOTE ADULT - ASSESSMENT
(1)CKD - stage 5 - diabetic nephropathy  (2)LEYDA - prerenally mediated  (3)ID - on empiric Zosyn  (4)Fatigue/weakness - likely at least in part from uremia. Given how difficult it is at this point to optimize his volume status, it appears that chronic HD may need to be initiated this admission. No urgency for now, however.    RECOMMEND:  (1)No diuretics for now.  WIll likely start in 24 hours   (2)Off IVF  (3)May need to start HD to prevent heart failure as outpt?  I have spoke with him and he seemed to understand   (4)Dose new meds for GFR <10     [facility]   [facility]   [phone]

## 2022-10-24 NOTE — PROGRESS NOTE ADULT - ASSESSMENT
Assessment  DMT2: 62y Male with DM T2 with hyperglycemia admitted with weakness, was on insulin at home, now on basal bolus insulin, blood sugars running high and not at target, no hypoglycemic episode,  eating meals, prednisone dose decreased..  CAD: on medications, monitored, asymptomatic.  HTN: On antihypertensive medications, monitored, asymptomatic.  CKD:  labs, chart reviewed.                  Rubén Escoto MD  Cell:  [phone]  Office: [phone]    Rachel SHORT  Cell:   Office: [phone]               Assessment  DMT2: 62y Male with DM T2 with hyperglycemia admitted with weakness, was on insulin at home, now on basal bolus insulin,  blood sugars running high and not at target, no hypoglycemic episode,  eating meals, prednisone dose decreased..  CAD: on medications, monitored, asymptomatic.  HTN: On antihypertensive medications, monitored, asymptomatic.  CKD:  labs, chart reviewed.                  Rubén Escoto MD  Cell:  [phone]  Office: [phone]    Rachel SHORT  Cell:   Office: [phone]

## 2022-10-24 NOTE — PROGRESS NOTE ADULT - SUBJECTIVE AND OBJECTIVE BOX
NEPHROLOGY-[facility] [phone]        Patient seen and examined in bed.  He was lying flat today and states that he was not SOB         MEDICATIONS  (STANDING):  allopurinol 100 milliGRAM(s) Oral daily  aspirin enteric coated 81 milliGRAM(s) Oral daily  atorvastatin 80 milliGRAM(s) Oral at bedtime  carvedilol 25 milliGRAM(s) Oral every 12 hours  colchicine 0.3 milliGRAM(s) Oral every 48 hours  dextrose 5%. 1000 milliLiter(s) (50 mL/Hr) IV Continuous <Continuous>  dextrose 5%. 1000 milliLiter(s) (100 mL/Hr) IV Continuous <Continuous>  dextrose 50% Injectable 25 Gram(s) IV Push once  dextrose 50% Injectable 12.5 Gram(s) IV Push once  dextrose 50% Injectable 25 Gram(s) IV Push once  dextrose Oral Gel 15 Gram(s) Oral once  gabapentin 300 milliGRAM(s) Oral two times a day  glucagon  Injectable 1 milliGRAM(s) IntraMuscular once  heparin   Injectable 5000 Unit(s) SubCutaneous every 12 hours  insulin glargine Injectable (LANTUS) 18 Unit(s) SubCutaneous at bedtime  insulin lispro (ADMELOG) corrective regimen sliding scale   SubCutaneous three times a day before meals  insulin lispro (ADMELOG) corrective regimen sliding scale   SubCutaneous at bedtime  insulin lispro Injectable (ADMELOG) 7 Unit(s) SubCutaneous three times a day before meals  meropenem  IVPB 1000 milliGRAM(s) IV Intermittent every 12 hours  predniSONE   Tablet 20 milliGRAM(s) Oral daily  sodium chloride 0.9%. 1000 milliLiter(s) (100 mL/Hr) IV Continuous <Continuous>  tamsulosin 0.4 milliGRAM(s) Oral at bedtime      VITAL:  T(C): , Max: 37.3 (10-23-22 @ 16:20)  T(F): , Max: 99.2 (10-23-22 @ 16:20)  HR: 84 (10-24-22 @ 08:25)  BP: 131/70 (10-24-22 @ 08:25)  BP(mean): --  RR: 18 (10-24-22 @ 08:25)  SpO2: 95% (10-24-22 @ 08:25)  Wt(kg): --    I and O's:    10-23 @ 07:01  -  10-24 @ 07:00  --------------------------------------------------------  IN: 2200 mL / OUT: 0 mL / NET: 2200 mL          PHYSICAL EXAM:    Constitutional: NAD  Neck:  No JVD  Respiratory: CTAB/L  Cardiovascular: S1 and S2  Gastrointestinal: BS+, soft, NT/ND  Extremities: No peripheral edema  Neurological: A/O x 3, no focal deficits  Psychiatric: Normal mood, normal affect  : No Jay  Skin: No rashes  Access: left  AVF     LABS:                        9.8    9.40  )-----------( 302      ( 24 Oct 2022 10:06 )             30.9     10-22    133<L>  |  91<L>  |  74<H>  ----------------------------<  282<H>  4.8   |  22  |  5.43<H>    Ca    9.3      22 Oct 2022 12:41    TPro  7.5  /  Alb  3.1<L>  /  TBili  0.4  /  DBili  x   /  AST  22  /  ALT  17  /  AlkPhos  214<H>  10-22          Urine Studies:  Urinalysis Basic - ( 22 Oct 2022 12:47 )    Color: Light Yellow / Appearance: Clear / S.013 / pH: x  Gluc: x / Ketone: Trace  / Bili: Negative / Urobili: Negative   Blood: x / Protein: 300 mg/dL / Nitrite: Negative   Leuk Esterase: Negative / RBC: 11 /hpf / WBC 6 /HPF   Sq Epi: x / Non Sq Epi: 5 /hpf / Bacteria: Negative            RADIOLOGY & ADDITIONAL STUDIES:

## 2022-10-24 NOTE — PROGRESS NOTE ADULT - SUBJECTIVE AND OBJECTIVE BOX
CC: f/u for  fever  Patient [place] is [place] but hurts less    REVIEW OF SYSTEMS:  All other review of systems negative (Comprehensive ROS)    Antimicrobials Day #  :  meropenem  IVPB 1000 milliGRAM(s) IV Intermittent every 12 hours    Other Medications Reviewed    T(F): 98.8 (10-24-22 @ 12:18), Max: 99.2 (10-23-22 @ 16:20)  HR: 86 (10-24-22 @ 12:18)  BP: 130/74 (10-24-22 @ 12:18)  RR: 18 (10-24-22 @ 12:18)  SpO2: 96% (10-24-22 @ 12:18)  Wt(kg): --    PHYSICAL EXAM:  General: lethargic but rouses no acute distress  Eyes:  anicteric, no conjunctival injection, no discharge  Oropharynx: no lesions or injection 	  Neck: supple, without adenopathy  Lungs: clear to auscultation  Heart: regular rate and rhythm; no murmur, rubs or gallops  Abdomen: soft, nondistended, nontender, without mass or organomegaly  Skin: no lesions  Extremities: no clubbing, cyanosis, . right hand less swollen, left hand is atrophic  Neurologic: very flat but will move a little better today    LAB RESULTS:                        9.8    9.40  )-----------( 302      ( 24 Oct 2022 10:06 )             30.9     10-24    132<L>  |  92<L>  |  102<H>  ----------------------------<  374<H>  5.3   |  18<L>  |  6.96<H>    Ca    8.8      24 Oct 2022 10:06          MICROBIOLOGY:  RECENT CULTURES:  10-22 @ 12:47 Clean Catch Clean Catch (Midstream)     No growth      10-22 @ 12:32 .Blood Blood-Peripheral     No growth to date.      10-22 @ 12:20 .Blood Blood-Peripheral     No growth to date.          RADIOLOGY REVIEWED:    < from: VA Duplex Upper Ext Vein Scan, Right (10.24.22 @ 10:16) >    ACC: [id number] EXAM:  DUPLEX EXT VEINS UPPER RT                          PROCEDURE DATE:  10/24/2022          INTERPRETATION:  CLINICAL INFORMATION: Right upper extremity swelling and   pain    COMPARISON: None available.    TECHNIQUE: Duplex sonography of the RIGHT UPPER extremity veins with   color and spectral Doppler, with and without compression.    FINDINGS:    The right internal jugular, subclavian, axillary and brachial veins are   patent and compressible where applicable.  The basilic and cephalic veins   (superficial veins) are patent and without thrombus.    Doppler examination shows normal spontaneous and phasic flow.    IMPRESSION:  No evidence of right upper extremity deep venous thrombosis.    < from: CT Head No Cont (10.23.22 @ 20:36) >    ACC: [id number] EXAM:  CT BRAIN                          PROCEDURE DATE:  10/23/2022          INTERPRETATION:  CT head without IV contrast    CLINICAL INFORMATION: Altered mental status    TECHNIQUE: Contiguous axial 5 mm sections were obtained through the head.   Sagittal and coronal 2-D reformatted images were also obtained.   This   scan was performed using automatic exposure control (radiation dose   reduction software) to obtain a diagnostic image quality scan with   patient dose as low as reasonably achievable.    FINDINGS:   CT dated 12/06/2021 available for review.    The brain demonstrates mild periventricular white matter ischemia with   old lacunar infarction LEFT basal ganglia. 4.1 x 2.4 cm arachnoid cyst   again noted in the anterior LEFT middle cranial fossa.   No acute   cerebral cortical infarct is seen.  No intracranial hemorrhage is found.    No mass effect is found in the brain.    The ventricles, sulci and basal cisterns appear unremarkable.    The orbits are unremarkable.  The paranasal sinuses are significant for   mild mucosal thickening in the BILATERAL maxillary sinuses.  The nasal   cavity appears intact.  The nasopharynx is symmetric.  The central skull   base, petrous temporal bones and calvarium remain intact.      IMPRESSION:   Mild periventricular white matter ischemia with old lacunar   infarction LEFT basal ganglia. 4.1 x 2.4 cm arachnoid cyst again noted in   the anterior LEFT middle cranial fossa.    --- End of Report ---        < end of copied text >      < end of copied text >    < from: CT Abdomen and Pelvis No Cont (10.22.22 @ 15:40) >    ACC: [id number] EXAM:  CT ABDOMEN AND PELVIS                        ACC: [id number] EXAM:  CT CHEST                          PROCEDURE DATE:  10/22/2022          INTERPRETATION:  CLINICAL INFORMATION: Generalized weakness and altered   mental status    COMPARISON: CT chest 7/26/2018 and CT abdomen/pelvis 1/18/2012    CONTRAST/COMPLICATIONS:  IV Contrast: NONE  Oral Contrast: NONE  Complications: None reported at time of study completion    PROCEDURE:  CT of the Chest, Abdomen and Pelvis was performed.  Sagittal and coronal reformats were performed.    FINDINGS:  CHEST:  LUNGS AND LARGE AIRWAYS: Patent central airways. Bibasilar subsegmental   atelectasis.  PLEURA: No pleural effusion.  VESSELS: Aortic and coronary artery calcifications. Left pulmonary artery   monitoring device.  HEART: Heart size is normal. No pericardial effusion.  MEDIASTINUM AND MARGARETTE: No lymphadenopathy.  CHEST WALL AND LOWER NECK: Bilateral gynecomastia.    ABDOMEN AND PELVIS:  LIVER: Within normal limits.  BILE DUCTS:Normal caliber.  GALLBLADDER: Cholelithiasis.  SPLEEN: Within normal limits.  PANCREAS: Within normal limits.  ADRENALS: Within normal limits.  KIDNEYS/URETERS: Within normal limits.    BLADDER: Mild bladder wall thickening.  REPRODUCTIVE ORGANS: Prostate within normal limits.    BOWEL: No bowel obstruction. Appendix is normal.  PERITONEUM: No ascites.  VESSELS: Atherosclerotic changes.  RETROPERITONEUM/LYMPH NODES: No lymphadenopathy.  ABDOMINAL WALL: Within normal limits.  BONES: Within normal limits.    IMPRESSION:  Mild bladder wall thickening. Correlate with urinalysis.        --- End of Report ---        < end of copied text >          Assessment:  Patient with dm, ckd, ischemic cardiomyopathy, very nonadherent to medical recommendations, had stay at [facility] last week for severe hyper glycemia to 800  but not ketotic, left hospital with a low grade temp , difficulty walking. Reviewed his case with Dr. Alvarez his chf doctor and she reports that he has been noncompliant and has a flat affect but usually makes sense. He now comes in with confusion, pain all over the body, temp to 102. I have concern for cns infection but at this point , given that he remains with a fairly stable mental status and vital signs , normal wbc on admission and he clearly had gout attack yesterday, I suspect that he is uremic as the cause of the mental status issues and that gout was the most likely cause of the fever. His cultures are so far neg, crypt ag is neg. He is planned for cns imaging so will continue meropenem for now but I am less concerned for primary cns infection today given how high the cr is . I will hold on further vanco as it is still therapeutic  Plan:  steroids per primary for gout  continue meropenem pending mri head findings  suspect he will need dialysis very soon  follow up cultures  may ultimately need an LP

## 2022-10-24 NOTE — CONSULT NOTE ADULT - SUBJECTIVE AND OBJECTIVE BOX
Neurology consult    ADAM [id number]     Patient is a 62y old  Male who presents with a chief complaint of fevers (24 Oct 2022 10:28)      HPI:  62 yom   hx of CKD,   s/p DKA, DM.     cardiomyopathy,  CAD w/ stents, HTN, gout       p/w days of generalized weakness and AMS    .Per wife, pt was just in [facility] for DKA.     d/c'd w/ fever and still weak. Since tuesday, pt becoming more lethargic and altered especially when he waking up in morning   glucose,  have been in the 200s since per cont BG monitor. Subjective fevers at home,    Question of if pt has heather compliant w/ short and long acting insulin as wife works nights and is not there to give all meds at home.    Unclear if pt has been falling at home but w/ bruising on his abdomen   (22 Oct 2022 18:27)          MEDICATIONS    allopurinol 100 milliGRAM(s) Oral daily  aspirin enteric coated 81 milliGRAM(s) Oral daily  atorvastatin 80 milliGRAM(s) Oral at bedtime  carvedilol 25 milliGRAM(s) Oral every 12 hours  colchicine 0.3 milliGRAM(s) Oral every 48 hours  dextrose 5%. 1000 milliLiter(s) IV Continuous <Continuous>  dextrose 5%. 1000 milliLiter(s) IV Continuous <Continuous>  dextrose 50% Injectable 25 Gram(s) IV Push once  dextrose 50% Injectable 12.5 Gram(s) IV Push once  dextrose 50% Injectable 25 Gram(s) IV Push once  dextrose Oral Gel 15 Gram(s) Oral once  gabapentin 300 milliGRAM(s) Oral two times a day  glucagon  Injectable 1 milliGRAM(s) IntraMuscular once  heparin   Injectable 5000 Unit(s) SubCutaneous every 12 hours  insulin glargine Injectable (LANTUS) 18 Unit(s) SubCutaneous at bedtime  insulin lispro (ADMELOG) corrective regimen sliding scale   SubCutaneous three times a day before meals  insulin lispro (ADMELOG) corrective regimen sliding scale   SubCutaneous at bedtime  insulin lispro Injectable (ADMELOG) 7 Unit(s) SubCutaneous three times a day before meals  meropenem  IVPB 1000 milliGRAM(s) IV Intermittent every 12 hours  oxycodone    5 mG/acetaminophen 325 mG 1 Tablet(s) Oral every 12 hours PRN  predniSONE   Tablet 20 milliGRAM(s) Oral daily  sodium chloride 0.9%. 1000 milliLiter(s) IV Continuous <Continuous>  tamsulosin 0.4 milliGRAM(s) Oral at bedtime      PMH: Diabetes Mellitus Type II, Uncontrolled    Dyslipidemia    s/p Angioplasty with Stent    HTN (hypertension)    Gout    Diabetic retinopathy    GERD (gastroesophageal reflux disease)    Nephrolithiasis    Vitamin D deficiency    Hepatitis    CKD (chronic kidney disease)    Ischemic cardiomyopathy    ASHD (arteriosclerotic heart disease)    Pulmonary hypertension    Myocardial infarction    Diabetes    CAD (coronary artery disease)    Gout    BPH (benign prostatic hyperplasia)    HTN (hypertension)         PSH: Retinal Hemorrhage    S/P coronary artery stent placement    History of eye surgery    H/O lithotripsy    Diabetes with retinopathy    CHF with cardiomyopathy    Stented coronary artery        Family history: No history of dementia, strokes, or seizures   FAMILY HISTORY:  Family history of cardiac disorder in father (Father)        SOCIAL HISTORY:  No history of tobacco or alcohol use     Allergies    No Known Drug Allergies  Seafood (Unknown)  shellfish (Unknown)    Intolerances            Vital Signs Last 24 Hrs  T(C): 37.1 (24 Oct 2022 12:18), Max: 37.3 (23 Oct 2022 16:20)  T(F): 98.8 (24 Oct 2022 12:18), Max: 99.2 (23 Oct 2022 16:20)  HR: 86 (24 Oct 2022 12:18) (83 - 89)  BP: 130/74 (24 Oct 2022 12:18) (108/63 - 136/81)  BP(mean): --  RR: 18 (24 Oct 2022 12:18) (17 - 18)  SpO2: 96% (24 Oct 2022 12:18) (95% - 98%)    Parameters below as of 24 Oct 2022 12:18  Patient On (Oxygen Delivery Method): room air          On Neurological Examination:    Mental Status - Patient is alert, awake, oriented X2 mild dysartric    Cranial Nerves - PERRL, EOMI, VFF, V1-V3 intact, right Nl flattening, tongue/uvula midline    Motor Exam -   distal atrophy dooes not fully coopeaste with confrontational exam    Sensory    Intact to light touch absent propioception in toes    Coord: dysmetria bilaterally     Gait -  deferred    Reflexes:         trace throughout toes equivocal                                                           LABS:  CBC Full  -  ( 24 Oct 2022 10:06 )  WBC Count : 9.40 K/uL  RBC Count : 3.43 M/uL  Hemoglobin : 9.8 g/dL  Hematocrit : 30.9 %  Platelet Count - Automated : 302 K/uL  Mean Cell Volume : 90.1 fl  Mean Cell Hemoglobin : 28.6 pg  Mean Cell Hemoglobin Concentration : 31.7 gm/dL  Auto Neutrophil # : x  Auto Lymphocyte # : x  Auto Monocyte # : x  Auto Eosinophil # : x  Auto Basophil # : x  Auto Neutrophil % : x  Auto Lymphocyte % : x  Auto Monocyte % : x  Auto Eosinophil % : x  Auto Basophil % : x    Urinalysis Basic - ( 22 Oct 2022 12:47 )    Color: Light Yellow / Appearance: Clear / S.013 / pH: x  Gluc: x / Ketone: Trace  / Bili: Negative / Urobili: Negative   Blood: x / Protein: 300 mg/dL / Nitrite: Negative   Leuk Esterase: Negative / RBC: 11 /hpf / WBC 6 /HPF   Sq Epi: x / Non Sq Epi: 5 /hpf / Bacteria: Negative      10-24    132<L>  |  92<L>  |  102<H>  ----------------------------<  374<H>  5.3   |  18<L>  |  6.96<H>    Ca    8.8      24 Oct 2022 10:06    TPro  7.5  /  Alb  3.1<L>  /  TBili  0.4  /  DBili  x   /  AST  22  /  ALT  17  /  AlkPhos  214<H>  10-22    LIVER FUNCTIONS - ( 22 Oct 2022 12:41 )  Alb: 3.1 g/dL / Pro: 7.5 g/dL / ALK PHOS: 214 U/L / ALT: 17 U/L / AST: 22 U/L / GGT: x           Hemoglobin A1C:       PT/INR - ( 22 Oct 2022 12:41 )   PT: 12.8 sec;   INR: 1.11 ratio         PTT - ( 22 Oct 2022 12:41 )  PTT:33.0 sec      RADIOLOGY  < from: CT Head No Cont (10.23.22 @ 20:36) >  IMPRESSION:   Mild periventricular white matter ischemia with old lacunar   infarction LEFT basal ganglia. 4.1 x 2.4 cm arachnoid cyst again noted in   the anterior LEFT middle cranial fossa.    < end of copied text >

## 2022-10-24 NOTE — PROGRESS NOTE ADULT - SUBJECTIVE AND OBJECTIVE BOX
Chief complaint  Patient is a 62y old  Male who presents with a chief complaint of fevers (24 Oct 2022 14:39)   Review of systems  Patient in bed, looks comfortable, no hypoglycemic episodes.    Labs and Fingersticks  CAPILLARY BLOOD GLUCOSE      POCT Blood Glucose.: 397 mg/dL (24 Oct 2022 12:32)  POCT Blood Glucose.: 369 mg/dL (24 Oct 2022 08:23)  POCT Blood Glucose.: 262 mg/dL (23 Oct 2022 21:58)  POCT Blood Glucose.: 178 mg/dL (23 Oct 2022 17:00)      Anion Gap, Serum: 22 *H* (10-24 @ 10:06)      Calcium, Total Serum: 8.8 (10-24 @ 10:06)          10-24    132<L>  |  92<L>  |  102<H>  ----------------------------<  374<H>  5.3   |  18<L>  |  6.96<H>    Ca    8.8      24 Oct 2022 10:06                          9.8    9.40  )-----------( 302      ( 24 Oct 2022 10:06 )             30.9     Medications  MEDICATIONS  (STANDING):  allopurinol 100 milliGRAM(s) Oral daily  aspirin enteric coated 81 milliGRAM(s) Oral daily  atorvastatin 80 milliGRAM(s) Oral at bedtime  carvedilol 25 milliGRAM(s) Oral every 12 hours  colchicine 0.3 milliGRAM(s) Oral every 48 hours  dextrose 5%. 1000 milliLiter(s) (50 mL/Hr) IV Continuous <Continuous>  dextrose 5%. 1000 milliLiter(s) (100 mL/Hr) IV Continuous <Continuous>  dextrose 50% Injectable 25 Gram(s) IV Push once  dextrose 50% Injectable 12.5 Gram(s) IV Push once  dextrose 50% Injectable 25 Gram(s) IV Push once  dextrose Oral Gel 15 Gram(s) Oral once  gabapentin 300 milliGRAM(s) Oral two times a day  glucagon  Injectable 1 milliGRAM(s) IntraMuscular once  heparin   Injectable 5000 Unit(s) SubCutaneous every 12 hours  insulin glargine Injectable (LANTUS) 20 Unit(s) SubCutaneous at bedtime  insulin lispro (ADMELOG) corrective regimen sliding scale   SubCutaneous three times a day before meals  insulin lispro (ADMELOG) corrective regimen sliding scale   SubCutaneous at bedtime  insulin lispro Injectable (ADMELOG) 8 Unit(s) SubCutaneous three times a day before meals  meropenem  IVPB 1000 milliGRAM(s) IV Intermittent every 12 hours  predniSONE   Tablet 20 milliGRAM(s) Oral daily  sodium chloride 0.9%. 1000 milliLiter(s) (100 mL/Hr) IV Continuous <Continuous>  tamsulosin 0.4 milliGRAM(s) Oral at bedtime      Physical Exam  General: Patient comfortable in bed  Vital Signs Last 12 Hrs  T(F): 98.8 (10-24-22 @ 12:18), Max: 98.9 (10-24-22 @ 08:25)  HR: 86 (10-24-22 @ 12:18) (84 - 86)  BP: 130/74 (10-24-22 @ 12:18) (130/72 - 131/70)  BP(mean): --  RR: 18 (10-24-22 @ 12:18) (18 - 18)  SpO2: 96% (10-24-22 @ 12:18) (95% - 97%)  Neck: No palpable thyroid nodules.  CVS: S1S2, No murmurs  Respiratory: No wheezing, no crepitations  GI: Abdomen soft, bowel sounds positive  Musculoskeletal:  edema lower extremities.   Skin: No skin rashes, no ecchymosis    Diagnostics             Chief complaint  Patient is a 62y old  Male who presents with a chief complaint of fevers (24 Oct 2022 14:39)   Review of systems  Patient in bed, looks comfortable, no hypoglycemic episodes.    Labs and Fingersticks  CAPILLARY BLOOD GLUCOSE      POCT Blood Glucose.: 397 mg/dL (24 Oct 2022 12:32)  POCT Blood Glucose.: 369 mg/dL (24 Oct 2022 08:23)  POCT Blood Glucose.: 262 mg/dL (23 Oct 2022 21:58)  POCT Blood Glucose.: 178 mg/dL (23 Oct 2022 17:00)      Anion Gap, Serum: 22 *H* (10-24 @ 10:06)      Calcium, Total Serum: 8.8 (10-24 @ 10:06)          10-24    132<L>  |  92<L>  |  102<H>  ----------------------------<  374<H>  5.3   |  18<L>  |  6.96<H>    Ca    8.8      24 Oct 2022 10:06                          9.8    9.40  )-----------( 302      ( 24 Oct 2022 10:06 )             30.9     Medications  MEDICATIONS  (STANDING):  allopurinol 100 milliGRAM(s) Oral daily  aspirin enteric coated 81 milliGRAM(s) Oral daily  atorvastatin 80 milliGRAM(s) Oral at bedtime  carvedilol 25 milliGRAM(s) Oral every 12 hours  colchicine 0.3 milliGRAM(s) Oral every 48 hours  dextrose 5%. 1000 milliLiter(s) (50 mL/Hr) IV Continuous <Continuous>  dextrose 5%. 1000 milliLiter(s) (100 mL/Hr) IV Continuous <Continuous>  dextrose 50% Injectable 25 Gram(s) IV Push once  dextrose 50% Injectable 12.5 Gram(s) IV Push once  dextrose 50% Injectable 25 Gram(s) IV Push once  dextrose Oral Gel 15 Gram(s) Oral once  gabapentin 300 milliGRAM(s) Oral two times a day  glucagon  Injectable 1 milliGRAM(s) IntraMuscular once  heparin   Injectable 5000 Unit(s) SubCutaneous every 12 hours  insulin glargine Injectable (LANTUS) 20 Unit(s) SubCutaneous at bedtime  insulin lispro (ADMELOG) corrective regimen sliding scale   SubCutaneous three times a day before meals  insulin lispro (ADMELOG) corrective regimen sliding scale   SubCutaneous at bedtime  insulin lispro Injectable (ADMELOG) 8 Unit(s) SubCutaneous three times a day before meals  meropenem  IVPB 1000 milliGRAM(s) IV Intermittent every 12 hours  predniSONE   Tablet 20 milliGRAM(s) Oral daily  sodium chloride 0.9%. 1000 milliLiter(s) (100 mL/Hr) IV Continuous <Continuous>  tamsulosin 0.4 milliGRAM(s) Oral at bedtime      Physical Exam  General: Patient comfortable in bed  Vital Signs Last 12 Hrs  T(F): 98.8 (10-24-22 @ 12:18), Max: 98.9 (10-24-22 @ 08:25)  HR: 86 (10-24-22 @ 12:18) (84 - 86)  BP: 130/74 (10-24-22 @ 12:18) (130/72 - 131/70)  BP(mean): --  RR: 18 (10-24-22 @ 12:18) (18 - 18)  SpO2: 96% (10-24-22 @ 12:18) (95% - 97%)  Neck: No palpable thyroid nodules.  CVS: S1S2, No murmurs  Respiratory: No wheezing, no crepitations  GI: Abdomen soft, bowel sounds positive  Musculoskeletal:  edema lower extremities.   Skin: No skin rashes, no ecchymosis    Diagnostics

## 2022-10-25 LAB
ANION GAP SERPL CALC-SCNC: 20 MMOL/L — HIGH (ref 5–17)
BUN SERPL-MCNC: 107 MG/DL — HIGH (ref 7–23)
CALCIUM SERPL-MCNC: 8.5 MG/DL — SIGNIFICANT CHANGE UP (ref 8.4–10.5)
CHLORIDE SERPL-SCNC: 92 MMOL/L — LOW (ref 96–108)
CO2 SERPL-SCNC: 19 MMOL/L — LOW (ref 22–31)
CREAT SERPL-MCNC: 7.72 MG/DL — HIGH (ref 0.5–1.3)
EGFR: 7 ML/MIN/1.73M2 — LOW
GLUCOSE BLDC GLUCOMTR-MCNC: 212 MG/DL — HIGH (ref 70–99)
GLUCOSE BLDC GLUCOMTR-MCNC: 226 MG/DL — HIGH (ref 70–99)
GLUCOSE BLDC GLUCOMTR-MCNC: 288 MG/DL — HIGH (ref 70–99)
GLUCOSE BLDC GLUCOMTR-MCNC: 301 MG/DL — HIGH (ref 70–99)
GLUCOSE SERPL-MCNC: 310 MG/DL — HIGH (ref 70–99)
HAV IGM SER-ACNC: SIGNIFICANT CHANGE UP
HBV CORE IGM SER-ACNC: SIGNIFICANT CHANGE UP
HBV SURFACE AG SER-ACNC: SIGNIFICANT CHANGE UP
HCT VFR BLD CALC: 31.4 % — LOW (ref 39–50)
HCV AB S/CO SERPL IA: 0.04 S/CO — SIGNIFICANT CHANGE UP (ref 0–0.99)
HCV AB SERPL-IMP: SIGNIFICANT CHANGE UP
HGB BLD-MCNC: 9.7 G/DL — LOW (ref 13–17)
MCHC RBC-ENTMCNC: 28 PG — SIGNIFICANT CHANGE UP (ref 27–34)
MCHC RBC-ENTMCNC: 30.9 GM/DL — LOW (ref 32–36)
MCV RBC AUTO: 90.8 FL — SIGNIFICANT CHANGE UP (ref 80–100)
NRBC # BLD: 0 /100 WBCS — SIGNIFICANT CHANGE UP (ref 0–0)
PLATELET # BLD AUTO: 302 K/UL — SIGNIFICANT CHANGE UP (ref 150–400)
POTASSIUM SERPL-MCNC: 4.8 MMOL/L — SIGNIFICANT CHANGE UP (ref 3.5–5.3)
POTASSIUM SERPL-SCNC: 4.8 MMOL/L — SIGNIFICANT CHANGE UP (ref 3.5–5.3)
RBC # BLD: 3.46 M/UL — LOW (ref 4.2–5.8)
RBC # FLD: 14.1 % — SIGNIFICANT CHANGE UP (ref 10.3–14.5)
SODIUM SERPL-SCNC: 131 MMOL/L — LOW (ref 135–145)
WBC # BLD: 8.62 K/UL — SIGNIFICANT CHANGE UP (ref 3.8–10.5)
WBC # FLD AUTO: 8.62 K/UL — SIGNIFICANT CHANGE UP (ref 3.8–10.5)

## 2022-10-25 PROCEDURE — 70551 MRI BRAIN STEM W/O DYE: CPT | Mod: 26

## 2022-10-25 PROCEDURE — 72141 MRI NECK SPINE W/O DYE: CPT | Mod: 26

## 2022-10-25 RX ORDER — INSULIN LISPRO 100/ML
11 VIAL (ML) SUBCUTANEOUS
Refills: 0 | Status: DISCONTINUED | OUTPATIENT
Start: 2022-10-25 | End: 2022-10-26

## 2022-10-25 RX ORDER — INSULIN GLARGINE 100 [IU]/ML
26 INJECTION, SOLUTION SUBCUTANEOUS AT BEDTIME
Refills: 0 | Status: DISCONTINUED | OUTPATIENT
Start: 2022-10-25 | End: 2022-10-26

## 2022-10-25 RX ORDER — ERYTHROPOIETIN 10000 [IU]/ML
10000 INJECTION, SOLUTION INTRAVENOUS; SUBCUTANEOUS ONCE
Refills: 0 | Status: DISCONTINUED | OUTPATIENT
Start: 2022-10-25 | End: 2022-10-26

## 2022-10-25 RX ORDER — LIDOCAINE AND PRILOCAINE CREAM 25; 25 MG/G; MG/G
1 CREAM TOPICAL
Refills: 0 | Status: DISCONTINUED | OUTPATIENT
Start: 2022-10-25 | End: 2022-11-14

## 2022-10-25 RX ADMIN — HEPARIN SODIUM 5000 UNIT(S): 5000 INJECTION INTRAVENOUS; SUBCUTANEOUS at 06:53

## 2022-10-25 RX ADMIN — Medication 3: at 12:40

## 2022-10-25 RX ADMIN — Medication 81 MILLIGRAM(S): at 12:40

## 2022-10-25 RX ADMIN — INSULIN GLARGINE 26 UNIT(S): 100 INJECTION, SOLUTION SUBCUTANEOUS at 23:11

## 2022-10-25 RX ADMIN — Medication 8 UNIT(S): at 08:40

## 2022-10-25 RX ADMIN — HEPARIN SODIUM 5000 UNIT(S): 5000 INJECTION INTRAVENOUS; SUBCUTANEOUS at 20:43

## 2022-10-25 RX ADMIN — Medication 11 UNIT(S): at 12:41

## 2022-10-25 RX ADMIN — Medication 20 MILLIGRAM(S): at 06:54

## 2022-10-25 RX ADMIN — GABAPENTIN 300 MILLIGRAM(S): 400 CAPSULE ORAL at 06:54

## 2022-10-25 RX ADMIN — GABAPENTIN 300 MILLIGRAM(S): 400 CAPSULE ORAL at 20:41

## 2022-10-25 RX ADMIN — CARVEDILOL PHOSPHATE 25 MILLIGRAM(S): 80 CAPSULE, EXTENDED RELEASE ORAL at 06:54

## 2022-10-25 RX ADMIN — TAMSULOSIN HYDROCHLORIDE 0.4 MILLIGRAM(S): 0.4 CAPSULE ORAL at 23:10

## 2022-10-25 RX ADMIN — MEROPENEM 100 MILLIGRAM(S): 1 INJECTION INTRAVENOUS at 20:45

## 2022-10-25 RX ADMIN — Medication 11 UNIT(S): at 20:22

## 2022-10-25 RX ADMIN — Medication 4: at 08:39

## 2022-10-25 RX ADMIN — Medication 100 MILLIGRAM(S): at 12:40

## 2022-10-25 RX ADMIN — ATORVASTATIN CALCIUM 80 MILLIGRAM(S): 80 TABLET, FILM COATED ORAL at 23:10

## 2022-10-25 RX ADMIN — Medication 2: at 20:21

## 2022-10-25 RX ADMIN — Medication 0.3 MILLIGRAM(S): at 20:40

## 2022-10-25 RX ADMIN — CARVEDILOL PHOSPHATE 25 MILLIGRAM(S): 80 CAPSULE, EXTENDED RELEASE ORAL at 20:41

## 2022-10-25 RX ADMIN — MEROPENEM 100 MILLIGRAM(S): 1 INJECTION INTRAVENOUS at 06:55

## 2022-10-25 NOTE — PROGRESS NOTE ADULT - SUBJECTIVE AND OBJECTIVE BOX
afberile    REVIEW OF SYSTEMS:  GEN: no fever,    no chills  RESP: no SOB,   no cough  CVS: no chest pain,   no palpitations  GI: no abdominal pain,   no nausea,   no vomiting,   no constipation,   no diarrhea  : no dysuria,   no frequency  NEURO: no headache,   no dizziness  PSYCH: no depression,   not anxious  Derm : no rash    MEDICATIONS  (STANDING):  allopurinol 100 milliGRAM(s) Oral daily  aspirin enteric coated 81 milliGRAM(s) Oral daily  atorvastatin 80 milliGRAM(s) Oral at bedtime  carvedilol 25 milliGRAM(s) Oral every 12 hours  colchicine 0.3 milliGRAM(s) Oral every 48 hours  dextrose 5%. 1000 milliLiter(s) (50 mL/Hr) IV Continuous <Continuous>  dextrose 5%. 1000 milliLiter(s) (100 mL/Hr) IV Continuous <Continuous>  dextrose 50% Injectable 25 Gram(s) IV Push once  dextrose 50% Injectable 12.5 Gram(s) IV Push once  dextrose 50% Injectable 25 Gram(s) IV Push once  dextrose Oral Gel 15 Gram(s) Oral once  gabapentin 300 milliGRAM(s) Oral two times a day  glucagon  Injectable 1 milliGRAM(s) IntraMuscular once  heparin   Injectable 5000 Unit(s) SubCutaneous every 12 hours  insulin glargine Injectable (LANTUS) 20 Unit(s) SubCutaneous at bedtime  insulin lispro (ADMELOG) corrective regimen sliding scale   SubCutaneous three times a day before meals  insulin lispro (ADMELOG) corrective regimen sliding scale   SubCutaneous at bedtime  insulin lispro Injectable (ADMELOG) 8 Unit(s) SubCutaneous three times a day before meals  meropenem  IVPB 1000 milliGRAM(s) IV Intermittent every 12 hours  predniSONE   Tablet 20 milliGRAM(s) Oral daily  sodium chloride 0.9%. 1000 milliLiter(s) (100 mL/Hr) IV Continuous <Continuous>  tamsulosin 0.4 milliGRAM(s) Oral at bedtime    MEDICATIONS  (PRN):  oxycodone    5 mG/acetaminophen 325 mG 1 Tablet(s) Oral every 12 hours PRN Moderate Pain (4 - 6)      Vital Signs Last 24 Hrs  T(C): 36.6 (24 Oct 2022 20:11), Max: 37.2 (24 Oct 2022 08:25)  T(F): 97.8 (24 Oct 2022 20:11), Max: 98.9 (24 Oct 2022 08:25)  HR: 80 (24 Oct 2022 20:11) (76 - 86)  BP: 124/73 (24 Oct 2022 20:11) (124/73 - 131/70)  BP(mean): --  RR: 18 (24 Oct 2022 20:11) (18 - 18)  SpO2: 94% (24 Oct 2022 20:11) (94% - 97%)    Parameters below as of 24 Oct 2022 20:11  Patient On (Oxygen Delivery Method): room air      CAPILLARY BLOOD GLUCOSE      POCT Blood Glucose.: 419 mg/dL (24 Oct 2022 21:22)  POCT Blood Glucose.: 355 mg/dL (24 Oct 2022 17:12)  POCT Blood Glucose.: 397 mg/dL (24 Oct 2022 12:32)  POCT Blood Glucose.: 369 mg/dL (24 Oct 2022 08:23)    I&O's Summary    23 Oct 2022 07:01  -  24 Oct 2022 07:00  --------------------------------------------------------  IN: 2200 mL / OUT: 0 mL / NET: 2200 mL    24 Oct 2022 07:01  -  25 Oct 2022 06:50  --------------------------------------------------------  IN: 240 mL / OUT: 0 mL / NET: 240 mL        PHYSICAL EXAM:  HEAD:  Atraumatic, Normocephalic  NECK: Supple, No   JVD  CHEST/LUNG:   no     rales,     no,    rhonchi  HEART: Regular rate and rhythm;         murmur  ABDOMEN: Soft, Nontender, ;   EXTREMITIES:     no   edema  NEUROLOGY:  alert    LABS:                        9.8    9.40  )-----------( 302      ( 24 Oct 2022 10:06 )             30.9     10-24    132<L>  |  92<L>  |  102<H>  ----------------------------<  374<H>  5.3   |  18<L>  |  6.96<H>    Ca    8.8      24 Oct 2022 10:06                              Consultant(s) Notes Reviewed:      Care Discussed with Consultants/Other Providers:

## 2022-10-25 NOTE — PROGRESS NOTE ADULT - SUBJECTIVE AND OBJECTIVE BOX
CC: f/u for fever and confusion    Patient reports: he is withdrawn, able to answer a few simple questions    REVIEW OF SYSTEMS:  All other review of systems negative (Comprehensive ROS): limited, weak, poorly interactive    Antimicrobials Day #  :day 3  meropenem  IVPB 1000 milliGRAM(s) IV Intermittent every 12 hours    Other Medications Reviewed  MEDICATIONS  (STANDING):  allopurinol 100 milliGRAM(s) Oral daily  aspirin enteric coated 81 milliGRAM(s) Oral daily  atorvastatin 80 milliGRAM(s) Oral at bedtime  carvedilol 25 milliGRAM(s) Oral every 12 hours  colchicine 0.3 milliGRAM(s) Oral every 48 hours  dextrose 5%. 1000 milliLiter(s) (50 mL/Hr) IV Continuous <Continuous>  dextrose 5%. 1000 milliLiter(s) (100 mL/Hr) IV Continuous <Continuous>  dextrose 50% Injectable 25 Gram(s) IV Push once  dextrose 50% Injectable 12.5 Gram(s) IV Push once  dextrose 50% Injectable 25 Gram(s) IV Push once  dextrose Oral Gel 15 Gram(s) Oral once  epoetin wayne-epbx (RETACRIT) Injectable 41812 Unit(s) IV Push once  gabapentin 300 milliGRAM(s) Oral two times a day  glucagon  Injectable 1 milliGRAM(s) IntraMuscular once  heparin   Injectable 5000 Unit(s) SubCutaneous every 12 hours  insulin glargine Injectable (LANTUS) 26 Unit(s) SubCutaneous at bedtime  insulin lispro (ADMELOG) corrective regimen sliding scale   SubCutaneous three times a day before meals  insulin lispro (ADMELOG) corrective regimen sliding scale   SubCutaneous at bedtime  insulin lispro Injectable (ADMELOG) 11 Unit(s) SubCutaneous three times a day before meals  lidocaine/prilocaine Cream 1 Application(s) Topical <User Schedule>  meropenem  IVPB 1000 milliGRAM(s) IV Intermittent every 12 hours  predniSONE   Tablet 20 milliGRAM(s) Oral daily  sodium chloride 0.9%. 1000 milliLiter(s) (100 mL/Hr) IV Continuous <Continuous>  tamsulosin 0.4 milliGRAM(s) Oral at bedtime    T(F): 97.5 (10-25-22 @ 16:03), Max: 98.2 (10-25-22 @ 08:16)  HR: 76 (10-25-22 @ 16:03)  BP: 135/79 (10-25-22 @ 16:03)  RR: 18 (10-25-22 @ 16:03)  SpO2: 96% (10-25-22 @ 16:03)  Wt(kg): --    PHYSICAL EXAM:  General: withdrawn, no acute distress  Eyes:  anicteric, no conjunctival injection, no discharge  Oropharynx: no lesions or injection 	  Neck: supple, without adenopathy  Lungs: clear to auscultation  Heart: regular rate and rhythm; no murmur, rubs or gallops  Abdomen: soft, nondistended, nontender, without mass or organomegaly  Skin: no lesions  Extremities: no clubbing, cyanosis, or edema  Neurologic: withdrawn, oriented, moves all extremities    LAB RESULTS:                        9.7    8.62  )-----------( 302      ( 25 Oct 2022 07:31 )             31.4     10-25    131<L>  |  92<L>  |  107<H>  ----------------------------<  310<H>  4.8   |  19<L>  |  7.72<H>    Ca    8.5      25 Oct 2022 07:32          MICROBIOLOGY:  RECENT CULTURES:  10-22 @ 12:47 Clean Catch Clean Catch (Midstream)     No growth      10-22 @ 12:32 .Blood Blood-Peripheral     No growth to date.      10-22 @ 12:20 .Blood Blood-Peripheral     No growth to date.          RADIOLOGY REVIEWED:    < from: CT Head No Cont (10.23.22 @ 20:36) >    IMPRESSION:   Mild periventricular white matter ischemia with old lacunar   infarction LEFT basal ganglia. 4.1 x 2.4 cm arachnoid cyst again noted in   the anterior LEFT middle cranial fossa.    --- End of Report ---    < end of copied text >  < from: CT Abdomen and Pelvis No Cont (10.22.22 @ 15:40) >    IMPRESSION:  Mild bladder wall thickening. Correlate with urinalysis.      < end of copied text >

## 2022-10-25 NOTE — PHYSICAL THERAPY INITIAL EVALUATION ADULT - BALANCE TRAINING, PT EVAL
GOAL: Pt will improve sitting dynamic and standing balance to good to improve transfers and functional mobility in 4wks.

## 2022-10-25 NOTE — PHYSICAL THERAPY INITIAL EVALUATION ADULT - ACTIVE RANGE OF MOTION EXAMINATION, REHAB EVAL
LUE WFL, RUE limited shld flex <90* AROM, ~90* AAROM, unable to reach hand to mouth, unable to fully open/close R hand; RLE preference for external rotation at hip, AAROM WFL BLE

## 2022-10-25 NOTE — PHYSICAL THERAPY INITIAL EVALUATION ADULT - ADDITIONAL COMMENTS
Pt lives in house with spouse and daughter. With steps to enter (2-3) with railing and flight of steps in home. Owns a rolling walker and cane. Independent with dressing and feeding ADLS. Spouse drives pt to appointments. Grab bars in bathroom with walk in shower.

## 2022-10-25 NOTE — PROGRESS NOTE ADULT - ASSESSMENT
2 yom   hx of CKD,   s/p DKA, DM.     cardiomyopathy,  CAD w/ stents, HTN, gout     generalized  weakness.  with   c/c h/o b/l leg weakness/ and   b/l arm weakness, r > l      p/w days of generalized weakness and AMS    .Per wife, pt was just in [facility] for DKA.     d/c'd w/ fever and still weak. Since tuesday, pt becoming more lethargic and altered especially when he waking up in morning   glucose,  have been in the 200s since per cont BG monitor. Subjective fevers at home,    Question of if pt has heather compliant w/ short and long acting insulin as wife works nights and is not there to give all meds at home.    Unclear if pt has been falling at home but w/ bruising on his abdomen        *   pt  admitted  with fevers/ ams. / metabolic  encephalopathy   on  arrival, wbc  was12,000       follow  bcx/ ucx,     on iv    Zosyn        Ct  a/p,   ? cystitis/   ID eval [facility]    * CKD,  crt is 5,4/ ,  renal  dr dickerson      c/c  anemia     *   DM,  follow  fs     on lantus/  known to  nika talley     *  ef 40     *  c/c  weakness  of   limbs/  neuro d rajeev mccain    has  funcytional  quadriplegia/  CT  head, . old  arcahnoid  cyst/  no intervention      *    acute  gout, right  wrist, on  prednisone/ colchicine    gout is improving   on dvt  ppx   bcx/  ucx, negative,  defer ab to ID. on   meropenem    [facility]  MRI  head/  C  spine    worsening  crt/  clau in  ckd             rad< from: CT Abdomen and Pelvis No Cont (10.22.22 @ 15:40) >  IMPRESSION:  Mild bladder wall thickening. Correlate with urinalysis  --- End of Report ---  < end of copied text >      rad< from: Transthoracic Echocardiogram (12.07.21 @ 11:24) >  nclusions:  1. Normal mitral valve. Minimal mitral regurgitation.  2. Calcified trileaflet aortic valve with normal opening.  Minimal aortic regurgitation.  3. Mild segmental left ventricular systolic dysfunction.  The basal inferior, basal septum, distal septum, distal  inferior, and the apex are akinetic. Recommend limited  repeat imaging with intravenous echo contrast to better  visualize the apex. (Per sonographer, patient did not agree  to echo contrast during the study.)  4. Mild diastolic dysfunction (Stage I).  5. Normal right ventricular size and function.  *** No previous Echo exam.    < end of copied text >

## 2022-10-25 NOTE — PHYSICAL THERAPY INITIAL EVALUATION ADULT - GROSSLY INTACT, SENSORY
absent light/deep touch to b/l toes, able to identify light touch b/l mid/hindfoot; intact upper LE and BUE/Left UE/Right UE

## 2022-10-25 NOTE — PROGRESS NOTE ADULT - SUBJECTIVE AND OBJECTIVE BOX
CARDIOLOGY     PROGRESS  NOTE   ________________________________________________    CHIEF COMPLAINT:Patient is a 62y old  Male who presents with a chief complaint of fevers (25 Oct 2022 06:50)  doing well  	  REVIEW OF SYSTEMS:  CONSTITUTIONAL: No fever, weight loss, or fatigue  EYES: No eye pain, visual disturbances, or discharge  ENT:  No difficulty hearing, tinnitus, vertigo; No sinus or throat pain  NECK: No pain or stiffness  RESPIRATORY: No cough, wheezing, chills or hemoptysis; No Shortness of Breath  CARDIOVASCULAR: No chest pain, palpitations, passing out, dizziness, or leg swelling  GASTROINTESTINAL: No abdominal or epigastric pain. No nausea, vomiting, or hematemesis; No diarrhea or constipation. No melena or hematochezia.  GENITOURINARY: No dysuria, frequency, hematuria, or incontinence  NEUROLOGICAL: No headaches, memory loss, loss of strength, numbness, or tremors, R sided weakness  SKIN: No itching, burning, rashes, or lesions   LYMPH Nodes: No enlarged glands  ENDOCRINE: No heat or cold intolerance; No hair loss  MUSCULOSKELETAL: No joint pain or swelling; No muscle, back, or extremity pain  PSYCHIATRIC: No depression, anxiety, mood swings, or difficulty sleeping  HEME/LYMPH: No easy bruising, or bleeding gums  ALLERGY AND IMMUNOLOGIC: No hives or eczema	    [x ] All others negative	  [ ] Unable to obtain    PHYSICAL EXAM:  T(C): 36.6 (10-24-22 @ 20:11), Max: 37.2 (10-24-22 @ 08:25)  HR: 80 (10-24-22 @ 20:11) (76 - 86)  BP: 124/73 (10-24-22 @ 20:11) (124/73 - 131/70)  RR: 18 (10-24-22 @ 20:11) (18 - 18)  SpO2: 94% (10-24-22 @ 20:11) (94% - 97%)  Wt(kg): --  I&O's Summary    24 Oct 2022 07:01  -  25 Oct 2022 07:00  --------------------------------------------------------  IN: 240 mL / OUT: 0 mL / NET: 240 mL        Appearance: Normal	  HEENT:   Normal oral mucosa, PERRL, EOMI	  Lymphatic: No lymphadenopathy  Cardiovascular: Normal S1 S2, No JVD, +murmurs, No edema  Respiratory: Lungs clear to auscultation	  Psychiatry: A & O x 3, Mood & affect appropriate  Gastrointestinal:  Soft, Non-tender, + BS	  Skin: No rashes, No ecchymoses, No cyanosis	  Neurologic: r sided weakness  Extremities: Normal range of motion, No clubbing, cyanosis or edema  Vascular: Peripheral pulses palpable 2+ bilaterally    MEDICATIONS  (STANDING):  allopurinol 100 milliGRAM(s) Oral daily  aspirin enteric coated 81 milliGRAM(s) Oral daily  atorvastatin 80 milliGRAM(s) Oral at bedtime  carvedilol 25 milliGRAM(s) Oral every 12 hours  colchicine 0.3 milliGRAM(s) Oral every 48 hours  dextrose 5%. 1000 milliLiter(s) (50 mL/Hr) IV Continuous <Continuous>  dextrose 5%. 1000 milliLiter(s) (100 mL/Hr) IV Continuous <Continuous>  dextrose 50% Injectable 25 Gram(s) IV Push once  dextrose 50% Injectable 12.5 Gram(s) IV Push once  dextrose 50% Injectable 25 Gram(s) IV Push once  dextrose Oral Gel 15 Gram(s) Oral once  gabapentin 300 milliGRAM(s) Oral two times a day  glucagon  Injectable 1 milliGRAM(s) IntraMuscular once  heparin   Injectable 5000 Unit(s) SubCutaneous every 12 hours  insulin glargine Injectable (LANTUS) 20 Unit(s) SubCutaneous at bedtime  insulin lispro (ADMELOG) corrective regimen sliding scale   SubCutaneous three times a day before meals  insulin lispro (ADMELOG) corrective regimen sliding scale   SubCutaneous at bedtime  insulin lispro Injectable (ADMELOG) 8 Unit(s) SubCutaneous three times a day before meals  meropenem  IVPB 1000 milliGRAM(s) IV Intermittent every 12 hours  predniSONE   Tablet 20 milliGRAM(s) Oral daily  sodium chloride 0.9%. 1000 milliLiter(s) (100 mL/Hr) IV Continuous <Continuous>  tamsulosin 0.4 milliGRAM(s) Oral at bedtime      TELEMETRY: 	    ECG:  	  RADIOLOGY:  OTHER: 	  	  LABS:	 	    CARDIAC MARKERS:                                9.8    9.40  )-----------( 302      ( 24 Oct 2022 10:06 )             30.9     10-24    132<L>  |  92<L>  |  102<H>  ----------------------------<  374<H>  5.3   |  18<L>  |  6.96<H>    Ca    8.8      24 Oct 2022 10:06      proBNP:   Lipid Profile:   HgA1c:   TSH:     62 yomhx of CKD,   s/p DKA, DM. cardiomyopathy,  CAD w/ stents, HTN, gout   p/w days of generalized weakness and AMS. states weakness worse than baseline. On Pe cognitive deficits Right Nl flattening (? from chronic left Bg infarct)  distal atrophy, propioceptive loss trace reflexes with equivocal toes withdrawal vs upgoing CTH as above    Impression: chronic advanced Neuropathy r/o superimposed central process    MRI Brain and C-spine w/o con  B12. folate, TSH methylmalonic acid  Outpt EMG      < from: CT Head No Cont (10.23.22 @ 20:36) >  IMPRESSION:   Mild periventricular white matter ischemia with old lacunar   infarction LEFT basal ganglia. 4.1 x 2.4 cm arachnoid cyst again noted in   the anterior LEFT middle cranial fossa.    (1)No diuretics for now.  WIll likely start in 24 hours   (2)Off IVF  (3)May need to start HD to prevent heart failure as outpt?  I have spoke with him and he seemed to understand   (4)Dose new meds for GFR <10       Assessment and plan  ---------------------------  62 yom hx of CKD, DKA, DMtype2, cardiomyopathy w/ stents, HTN, gout p/w days of generalized weakness and AMS. Per wife, pt was just in [facility] for DKA. Was d/c'd w/ fever and still weak. Since tuesday, pt becoming more lethargic and altered especially when he waking up in morning. BG have been in the 200s since per cont BG monitor. Subjective fevers at home, wife feels that his abd is larger than it was before d/c from [facility]. Question of if pt has heather compliant w/ short and long acting insulin as wife works nights and is not there to give all meds at home. Unclear if pt has been falling at home but w/ bruising on his abd.   pt with hx of ashd, chf, cardiomyopathy, dm, s/p CARDIOMEM fu at [facility] with change of mental stratus and sob.  ct scan noted , no active chf  will adjust cardiac meds  will call chf team at [facility] to get Cardiomem readings  beta blocker/ hydralazine and Nitrate  ckd, renal follow up  check pro bnp  dvt prophylaxis  pt with known hx of cad, last stress test  in 07/2021, no ischemia with scar in mary septal and apical area  ct chest abdomen and pelvis noted, no pleural effusion or sign of chf  renal increased renal function last admission CR 4.8  continue asa daily /coreg  will add hydralazine and nitrate as tolerated  renal /ID noted  ?metabolic encephalopathy, but pt responds to the question appropriately, r sided weakness , neuro noted  avoid excess fluid, may dc ivf, hx of chf , EF40%  weill discuss with renal ?prperation for HD

## 2022-10-25 NOTE — PROGRESS NOTE ADULT - SUBJECTIVE AND OBJECTIVE BOX
NEPHROLOGY-[facility] [phone]        Patient seen and examined in bed. He was ready for HD         MEDICATIONS  (STANDING):  allopurinol 100 milliGRAM(s) Oral daily  aspirin enteric coated 81 milliGRAM(s) Oral daily  atorvastatin 80 milliGRAM(s) Oral at bedtime  carvedilol 25 milliGRAM(s) Oral every 12 hours  colchicine 0.3 milliGRAM(s) Oral every 48 hours  dextrose 5%. 1000 milliLiter(s) (50 mL/Hr) IV Continuous <Continuous>  dextrose 5%. 1000 milliLiter(s) (100 mL/Hr) IV Continuous <Continuous>  dextrose 50% Injectable 25 Gram(s) IV Push once  dextrose 50% Injectable 12.5 Gram(s) IV Push once  dextrose 50% Injectable 25 Gram(s) IV Push once  dextrose Oral Gel 15 Gram(s) Oral once  gabapentin 300 milliGRAM(s) Oral two times a day  glucagon  Injectable 1 milliGRAM(s) IntraMuscular once  heparin   Injectable 5000 Unit(s) SubCutaneous every 12 hours  insulin glargine Injectable (LANTUS) 26 Unit(s) SubCutaneous at bedtime  insulin lispro (ADMELOG) corrective regimen sliding scale   SubCutaneous three times a day before meals  insulin lispro (ADMELOG) corrective regimen sliding scale   SubCutaneous at bedtime  insulin lispro Injectable (ADMELOG) 11 Unit(s) SubCutaneous three times a day before meals  meropenem  IVPB 1000 milliGRAM(s) IV Intermittent every 12 hours  predniSONE   Tablet 20 milliGRAM(s) Oral daily  sodium chloride 0.9%. 1000 milliLiter(s) (100 mL/Hr) IV Continuous <Continuous>  tamsulosin 0.4 milliGRAM(s) Oral at bedtime      VITAL:  T(C): , Max: 37.1 (10-24-22 @ 12:18)  T(F): , Max: 98.8 (10-24-22 @ 12:18)  HR: 83 (10-25-22 @ 08:16)  BP: 135/80 (10-25-22 @ 08:16)  BP(mean): --  RR: 18 (10-25-22 @ 08:16)  SpO2: 92% (10-25-22 @ 08:16)  Wt(kg): --    I and O's:    10-24 @ 07:01  -  10-25 @ 07:00  --------------------------------------------------------  IN: 240 mL / OUT: 0 mL / NET: 240 mL          PHYSICAL EXAM:    Constitutional: NAD  Neck:  No JVD  Respiratory: CTAB/L  Cardiovascular: S1 and S2  Gastrointestinal: BS+, soft, NT/ND  Extremities: No peripheral edema  Neurological: A/O x 3, no focal deficits  Psychiatric: Normal mood, normal affect  : No Jay  Skin: No rashes  Access: Not applicable    LABS:                        9.7    8.62  )-----------( 302      ( 25 Oct 2022 07:31 )             31.4     10-25    131<L>  |  92<L>  |  107<H>  ----------------------------<  310<H>  4.8   |  19<L>  |  7.72<H>    Ca    8.5      25 Oct 2022 07:32            Urine Studies:          RADIOLOGY & ADDITIONAL STUDIES:        < from: Transthoracic Echocardiogram (12.07.21 @ 11:24) >    Patient name: ADAM KOWALSKI  YOB: 1960   Age: 61 (M)   MR#: [id number]  Study Date: 12/7/2021  [facility]  Study quality: Technically difficult  Referring Physician: Lilian Mccullough MD  Blood Pressure: 110/68 mmHg  Height: 170 cm  Weight: 82 kg  BSA: 1.9 m2  ------------------------------------------------------------------------  PROCEDURE: Transthoracic echocardiogram with 2-D, M-Mode  and complete spectral and color flow Doppler.  INDICATION: Abnormal electrocardiogram (ECG) (EKG) (R94.31)  ------------------------------------------------------------------------  Dimensions:    Normal Values:  LA:     3.1    2.0 - 4.0 cm  Ao:     3.1    2.0 - 3.8 cm  SEPTUM: 1.0    0.6 - 1.2 cm  PWT:0.8    0.6 - 1.1 cm  LVIDd:  3.9    3.0 - 5.6 cm  LVIDs:  2.8    1.8 - 4.0 cm  Derived variables:  LVMI: 55 g/m2  RWT: 0.41  Fractional short: 28 %  EF (Visual Estimate): 40-45 %  Doppler Peak Velocity (m/sec): AoV=1.1  ------------------------------------------------------------------------  Observations:  Mitral Valve: Normal mitral valve. Minimal mitral  regurgitation.  Aortic Valve/Aorta: Calcified trileaflet aortic valve with  normal opening. Peak transaortic valve gradient equals 5 mm  Hg, mean transaortic valve gradient equals 3 mm Hg, aortic  valve velocity time integral equals 20 cm. Minimal aortic  regurgitation. Peak left ventricular outflow tract gradient  equals 3 mm Hg, mean gradient is equal to 1 mm Hg, LVOT  velocity time integral equals 14 cm.  Aortic Root: 3.1 cm.  Left Atrium: Normal left atrium.  LA volume index = 20  cc/m2.  Left Ventricle: Mild segmental left ventricular systolic  dysfunction. The basal inferior, basal septum, distal  septum, distal inferior, and theapex are akinetic.  Recommend limited repeat imaging with intravenous echo  contrast to better visualize the apex. (Per sonographer,  patient did not agree to echo contrast during the study.)  Normal left ventricular internal dimensions and wall  thickness. Mild diastolic dysfunction (Stage I).  Right Heart: Normal right atrium. Normal right ventricular  size and function. Normal tricuspid valve. Minimal  tricuspid regurgitation. Normal pulmonic valve. Minimal  pulmonic regurgitation.  Pericardium/Pleura: Normal pericardium with no pericardial  effusion.  Hemodynamic: Estimated right atrial pressure equals 3 mmHg.  Inadequate tricuspid regurgitation Doppler envelope  precludes estimation of RVSP.  ------------------------------------------------------------------------  Conclusions:  1. Normal mitral valve. Minimal mitral regurgitation.  2. Calcified trileaflet aortic valve with normal opening.  Minimal aortic regurgitation.  3. Mild segmental left ventricular systolic dysfunction.  The basal inferior, basal septum, distal septum, distal  inferior, and the apex are akinetic. Recommend limited  repeat imaging with intravenous echo contrast to better  visualize the apex. (Per sonographer, patient did not agree  to echo contrast during the study.)  4. Mild diastolic dysfunction (Stage I).  5. Normal right ventricular size and function.  *** No previous Echo exam.  ------------------------------------------------------------------------  Confirmed on  12/7/2021 - 13:34:25 by Dedrick Smith M.D.  ------------------------------------------------------------------------    < end of copied text >

## 2022-10-25 NOTE — PHYSICAL THERAPY INITIAL EVALUATION ADULT - PERTINENT HX OF CURRENT PROBLEM, REHAB EVAL
Pt is a 61 yo male admitted to [facility] on 10/22/22 with hx of CKD,  s/p DKA, DM. cardiomyopathy,  CAD w/ stents, HTN, gout p/w days of generalized weakness and AMS. Per wife, pt was just in [facility] for DKA. d/c'd w/ fever and still weak. Since tuesday, pt becoming more lethargic and altered especially when he waking up in morning. BS in 200s since per cont BG monitor. Subjective fevers at home, Uncertain if pt compliant w/ short and long acting insulin as wife works nights and is not there to give all meds at home. Unclear if pt has been falling at home but w/ bruising on his abdomen     Hospital course: CXR: Clear lungs. No pleural effusions or pneumothorax. Heart size and mediastinal width inaccurately assessed on the projection. Redemonstrated left coronary stent and left PA region cardioMEMS device. Trachea midline. Unremarkable osseous structures. CT Chest: Mild bladder wall thickening. CTH: Mild periventricular white matter ischemia with old lacunar  infarction LEFT basal ganglia. 4.1 x 2.4 cm arachnoid cyst again noted in the anterior LEFT middle cranial fossa. RUE doppler: (-) DVT

## 2022-10-25 NOTE — PHYSICAL THERAPY INITIAL EVALUATION ADULT - GENERAL OBSERVATIONS, REHAB EVAL
Pt received supine in bed, +IVL, +pink band LULUCIA GRAHAM&OX2-3 (self, place, year only), +follows simple commands ~75% of the time, +requires frequent repetition and direction of task.

## 2022-10-25 NOTE — PROGRESS NOTE ADULT - SUBJECTIVE AND OBJECTIVE BOX
Chief complaint  Patient is a 62y old  Male who presents with a chief complaint of fevers (25 Oct 2022 11:27)   Review of systems  Patient in bed, looks comfortable, no hypoglycemic episodes.    Labs and Fingersticks  CAPILLARY BLOOD GLUCOSE      POCT Blood Glucose.: 288 mg/dL (25 Oct 2022 12:24)  POCT Blood Glucose.: 301 mg/dL (25 Oct 2022 08:19)  POCT Blood Glucose.: 419 mg/dL (24 Oct 2022 21:22)  POCT Blood Glucose.: 355 mg/dL (24 Oct 2022 17:12)      Anion Gap, Serum: 20 *H* (10-25 @ 07:32)  Anion Gap, Serum: 22 *H* (10-24 @ 10:06)      Calcium, Total Serum: 8.5 (10-25 @ 07:32)  Calcium, Total Serum: 8.8 (10-24 @ 10:06)          10-25    131<L>  |  92<L>  |  107<H>  ----------------------------<  310<H>  4.8   |  19<L>  |  7.72<H>    Ca    8.5      25 Oct 2022 07:32                          9.7    8.62  )-----------( 302      ( 25 Oct 2022 07:31 )             31.4     Medications  MEDICATIONS  (STANDING):  allopurinol 100 milliGRAM(s) Oral daily  aspirin enteric coated 81 milliGRAM(s) Oral daily  atorvastatin 80 milliGRAM(s) Oral at bedtime  carvedilol 25 milliGRAM(s) Oral every 12 hours  colchicine 0.3 milliGRAM(s) Oral every 48 hours  dextrose 5%. 1000 milliLiter(s) (50 mL/Hr) IV Continuous <Continuous>  dextrose 5%. 1000 milliLiter(s) (100 mL/Hr) IV Continuous <Continuous>  dextrose 50% Injectable 25 Gram(s) IV Push once  dextrose 50% Injectable 12.5 Gram(s) IV Push once  dextrose 50% Injectable 25 Gram(s) IV Push once  dextrose Oral Gel 15 Gram(s) Oral once  epoetin wayne-epbx (RETACRIT) Injectable 97669 Unit(s) IV Push once  gabapentin 300 milliGRAM(s) Oral two times a day  glucagon  Injectable 1 milliGRAM(s) IntraMuscular once  heparin   Injectable 5000 Unit(s) SubCutaneous every 12 hours  insulin glargine Injectable (LANTUS) 26 Unit(s) SubCutaneous at bedtime  insulin lispro (ADMELOG) corrective regimen sliding scale   SubCutaneous three times a day before meals  insulin lispro (ADMELOG) corrective regimen sliding scale   SubCutaneous at bedtime  insulin lispro Injectable (ADMELOG) 11 Unit(s) SubCutaneous three times a day before meals  lidocaine/prilocaine Cream 1 Application(s) Topical <User Schedule>  meropenem  IVPB 1000 milliGRAM(s) IV Intermittent every 12 hours  predniSONE   Tablet 20 milliGRAM(s) Oral daily  sodium chloride 0.9%. 1000 milliLiter(s) (100 mL/Hr) IV Continuous <Continuous>  tamsulosin 0.4 milliGRAM(s) Oral at bedtime      Physical Exam  General: Patient comfortable in bed  Vital Signs Last 12 Hrs  T(F): 98.2 (10-25-22 @ 08:16), Max: 98.2 (10-25-22 @ 08:16)  HR: 76 (10-25-22 @ 11:08) (76 - 85)  BP: 137/85 (10-25-22 @ 11:08) (135/80 - 151/88)  BP(mean): --  RR: 18 (10-25-22 @ 08:16) (18 - 18)  SpO2: 95% (10-25-22 @ 11:08) (92% - 95%)  Neck: No palpable thyroid nodules.  CVS: S1S2, No murmurs  Respiratory: No wheezing, no crepitations  GI: Abdomen soft, bowel sounds positive  Musculoskeletal:  edema lower extremities.   Skin: No skin rashes, no ecchymosis    Diagnostics             Chief complaint  Patient is a 62y old  Male who presents with a chief complaint of fevers (25 Oct 2022 11:27)   Review of systems  Patient in bed, looks comfortable, no hypoglycemic episodes.    Labs and Fingersticks  CAPILLARY BLOOD GLUCOSE      POCT Blood Glucose.: 288 mg/dL (25 Oct 2022 12:24)  POCT Blood Glucose.: 301 mg/dL (25 Oct 2022 08:19)  POCT Blood Glucose.: 419 mg/dL (24 Oct 2022 21:22)  POCT Blood Glucose.: 355 mg/dL (24 Oct 2022 17:12)      Anion Gap, Serum: 20 *H* (10-25 @ 07:32)  Anion Gap, Serum: 22 *H* (10-24 @ 10:06)      Calcium, Total Serum: 8.5 (10-25 @ 07:32)  Calcium, Total Serum: 8.8 (10-24 @ 10:06)          10-25    131<L>  |  92<L>  |  107<H>  ----------------------------<  310<H>  4.8   |  19<L>  |  7.72<H>    Ca    8.5      25 Oct 2022 07:32                          9.7    8.62  )-----------( 302      ( 25 Oct 2022 07:31 )             31.4   Medications  MEDICATIONS  (STANDING):  allopurinol 100 milliGRAM(s) Oral daily  aspirin enteric coated 81 milliGRAM(s) Oral daily  atorvastatin 80 milliGRAM(s) Oral at bedtime  carvedilol 25 milliGRAM(s) Oral every 12 hours  colchicine 0.3 milliGRAM(s) Oral every 48 hours  dextrose 5%. 1000 milliLiter(s) (50 mL/Hr) IV Continuous <Continuous>  dextrose 5%. 1000 milliLiter(s) (100 mL/Hr) IV Continuous <Continuous>  dextrose 50% Injectable 25 Gram(s) IV Push once  dextrose 50% Injectable 12.5 Gram(s) IV Push once  dextrose 50% Injectable 25 Gram(s) IV Push once  dextrose Oral Gel 15 Gram(s) Oral once  epoetin wayne-epbx (RETACRIT) Injectable 58617 Unit(s) IV Push once  gabapentin 300 milliGRAM(s) Oral two times a day  glucagon  Injectable 1 milliGRAM(s) IntraMuscular once  heparin   Injectable 5000 Unit(s) SubCutaneous every 12 hours  insulin glargine Injectable (LANTUS) 26 Unit(s) SubCutaneous at bedtime  insulin lispro (ADMELOG) corrective regimen sliding scale   SubCutaneous three times a day before meals  insulin lispro (ADMELOG) corrective regimen sliding scale   SubCutaneous at bedtime  insulin lispro Injectable (ADMELOG) 11 Unit(s) SubCutaneous three times a day before meals  lidocaine/prilocaine Cream 1 Application(s) Topical <User Schedule>  meropenem  IVPB 1000 milliGRAM(s) IV Intermittent every 12 hours  predniSONE   Tablet 20 milliGRAM(s) Oral daily  sodium chloride 0.9%. 1000 milliLiter(s) (100 mL/Hr) IV Continuous <Continuous>  tamsulosin 0.4 milliGRAM(s) Oral at bedtime      Physical Exam  General: Patient comfortable in bed  Vital Signs Last 12 Hrs  T(F): 98.2 (10-25-22 @ 08:16), Max: 98.2 (10-25-22 @ 08:16)  HR: 76 (10-25-22 @ 11:08) (76 - 85)  BP: 137/85 (10-25-22 @ 11:08) (135/80 - 151/88)  BP(mean): --  RR: 18 (10-25-22 @ 08:16) (18 - 18)  SpO2: 95% (10-25-22 @ 11:08) (92% - 95%)  Neck: No palpable thyroid nodules.  CVS: S1S2, No murmurs  Respiratory: No wheezing, no crepitations  GI: Abdomen soft, bowel sounds positive  Musculoskeletal:  edema lower extremities.   Skin: No skin rashes, no ecchymosis    Diagnostics

## 2022-10-25 NOTE — PROGRESS NOTE ADULT - ASSESSMENT
(1)CKD - stage 5 - diabetic nephropathy  (2)LEYDA - prerenally mediated  (3)ID - on empiric Zosyn  (4)Fatigue/weakness - likely at least in part from uremia. Given how difficult it is at this point to optimize his volume status, it appears that chronic HD may need to be initiated this admission. No urgency for now, however.    RECOMMEND:  (1)Start HD today   (2)Off IVF;    (3)Dose new meds for GFR <10     [facility]ayed [facility]   [phone]

## 2022-10-25 NOTE — PHYSICAL THERAPY INITIAL EVALUATION ADULT - IMPAIRMENTS CONTRIBUTING IMPAIRED BED MOBILITY, REHAB EVAL
impaired balance/cognition/impaired coordination/impaired postural control/decreased sensation/decreased strength

## 2022-10-25 NOTE — PHYSICAL THERAPY INITIAL EVALUATION ADULT - IMPAIRED TRANSFERS: SIT/STAND, REHAB EVAL
decr endurance, weak BLE blocking to prevent buckling/impaired balance/cognition/impaired coordination/impaired postural control/decreased strength

## 2022-10-25 NOTE — PROGRESS NOTE ADULT - ASSESSMENT
Assessment  DMT2: 62y Male with DM T2 with hyperglycemia admitted with weakness, was on insulin at home, now on basal bolus insulin, increased dose yesterday, blood sugars are still running high and not at target, no hypoglycemic episodes, eating meals, remains on prednisone.  CAD: on medications, monitored, asymptomatic.  HTN: On antihypertensive medications, monitored, asymptomatic.  CKD:  labs, chart reviewed.                  Rubén Escoto MD  Cell:  [phone]  Office: [phone]    Rachel SHORT  Cell:   Office: [phone]               Assessment  DMT2: 62y Male with DM T2 with hyperglycemia admitted with weakness, was on insulin at home, now on basal bolus insulin, increased dose yesterday, blood sugars are still running high and not  at target, no hypoglycemic episodes, eating meals, remains on prednisone.  CAD: on medications, monitored, asymptomatic.  HTN: On antihypertensive medications, monitored, asymptomatic.  CKD:  labs, chart reviewed.                  Rubén Escoto MD  Cell:  [phone]  Office: [phone]    Rachel SHORT  Cell:   Office: [phone]

## 2022-10-25 NOTE — PHYSICAL THERAPY INITIAL EVALUATION ADULT - STRENGTHENING, PT EVAL
GOAL: pt will improve RUE and RLE strength to 2+/5 to improve transfers and functional mobility in 4wks.
90.3

## 2022-10-25 NOTE — PROGRESS NOTE ADULT - ASSESSMENT
63 yo male with a history of DM, ischemic cardiomyopathy, CAD,HTN, gout, CKD, and DM.  He was recently hospitalized at [facility] for hyperglycemia.  Low grade fever with a toxic metabolic encephalopathy.  His blood and urine cultures have been negative.  Serum crypt antigen negative.  CT scan of C/A/P without a source of infection outside of a thickened bladder(negative urine culture)  Neurology w/u in progress  Will d/c meropenem in AM if micro remains negative  ? Metabolic encephalopathy  Additional w/u pending course   63 yo male with a history of DM, ischemic cardiomyopathy, CAD,HTN, gout, CKD, and DM.  He was recently hospitalized at [facility] for hyperglycemia.  Low grade fever with a toxic metabolic encephalopathy.  His blood and urine cultures have been negative.  Serum crypt antigen negative.  CT scan of C/A/P without a source of infection outside of a thickened bladder(negative urine culture)  Neurology w/u in progress  Will d/c meropenem in AM if micro remains negative  ? Metabolic encephalopathy  Additional w/u pending course  Prior fevers may have reflected gout

## 2022-10-25 NOTE — PHYSICAL THERAPY INITIAL EVALUATION ADULT - NSPTDISCHREC_GEN_A_CORE
DC plan subacute rehab for strengthening, balance training, endurance training, improve overall functional mobility; if pt to go home, home PT services, assist for ALL mobility/ADLs, owns rolling walker & cane, +Grabbars in walk in shower, recommendations patient lift device for OOB transfers, transport WC for distance at this time, CM to be notified./Sub-acute Rehab

## 2022-10-26 ENCOUNTER — APPOINTMENT (OUTPATIENT)
Dept: INTERNAL MEDICINE | Facility: CLINIC | Age: 62
End: 2022-10-26

## 2022-10-26 LAB
ANION GAP SERPL CALC-SCNC: 17 MMOL/L — SIGNIFICANT CHANGE UP (ref 5–17)
BUN SERPL-MCNC: 95 MG/DL — HIGH (ref 7–23)
CALCIUM SERPL-MCNC: 8.2 MG/DL — LOW (ref 8.4–10.5)
CHLORIDE SERPL-SCNC: 95 MMOL/L — LOW (ref 96–108)
CO2 SERPL-SCNC: 23 MMOL/L — SIGNIFICANT CHANGE UP (ref 22–31)
CREAT SERPL-MCNC: 6.16 MG/DL — HIGH (ref 0.5–1.3)
EGFR: 10 ML/MIN/1.73M2 — LOW
GLUCOSE BLDC GLUCOMTR-MCNC: 214 MG/DL — HIGH (ref 70–99)
GLUCOSE BLDC GLUCOMTR-MCNC: 269 MG/DL — HIGH (ref 70–99)
GLUCOSE BLDC GLUCOMTR-MCNC: 281 MG/DL — HIGH (ref 70–99)
GLUCOSE BLDC GLUCOMTR-MCNC: 293 MG/DL — HIGH (ref 70–99)
GLUCOSE BLDC GLUCOMTR-MCNC: 315 MG/DL — HIGH (ref 70–99)
GLUCOSE SERPL-MCNC: 239 MG/DL — HIGH (ref 70–99)
HCT VFR BLD CALC: 31.6 % — LOW (ref 39–50)
HGB BLD-MCNC: 10 G/DL — LOW (ref 13–17)
MCHC RBC-ENTMCNC: 28.8 PG — SIGNIFICANT CHANGE UP (ref 27–34)
MCHC RBC-ENTMCNC: 31.6 GM/DL — LOW (ref 32–36)
MCV RBC AUTO: 91.1 FL — SIGNIFICANT CHANGE UP (ref 80–100)
NRBC # BLD: 0 /100 WBCS — SIGNIFICANT CHANGE UP (ref 0–0)
PLATELET # BLD AUTO: 316 K/UL — SIGNIFICANT CHANGE UP (ref 150–400)
POTASSIUM SERPL-MCNC: 4.2 MMOL/L — SIGNIFICANT CHANGE UP (ref 3.5–5.3)
POTASSIUM SERPL-SCNC: 4.2 MMOL/L — SIGNIFICANT CHANGE UP (ref 3.5–5.3)
RBC # BLD: 3.47 M/UL — LOW (ref 4.2–5.8)
RBC # FLD: 14.2 % — SIGNIFICANT CHANGE UP (ref 10.3–14.5)
SODIUM SERPL-SCNC: 135 MMOL/L — SIGNIFICANT CHANGE UP (ref 135–145)
WBC # BLD: 8.56 K/UL — SIGNIFICANT CHANGE UP (ref 3.8–10.5)
WBC # FLD AUTO: 8.56 K/UL — SIGNIFICANT CHANGE UP (ref 3.8–10.5)

## 2022-10-26 RX ORDER — INSULIN LISPRO 100/ML
12 VIAL (ML) SUBCUTANEOUS
Refills: 0 | Status: DISCONTINUED | OUTPATIENT
Start: 2022-10-26 | End: 2022-10-27

## 2022-10-26 RX ORDER — INSULIN GLARGINE 100 [IU]/ML
30 INJECTION, SOLUTION SUBCUTANEOUS AT BEDTIME
Refills: 0 | Status: DISCONTINUED | OUTPATIENT
Start: 2022-10-26 | End: 2022-10-27

## 2022-10-26 RX ORDER — ERYTHROPOIETIN 10000 [IU]/ML
10000 INJECTION, SOLUTION INTRAVENOUS; SUBCUTANEOUS ONCE
Refills: 0 | Status: COMPLETED | OUTPATIENT
Start: 2022-10-26 | End: 2022-10-26

## 2022-10-26 RX ADMIN — LIDOCAINE AND PRILOCAINE CREAM 1 APPLICATION(S): 25; 25 CREAM TOPICAL at 16:46

## 2022-10-26 RX ADMIN — Medication 100 MILLIGRAM(S): at 12:26

## 2022-10-26 RX ADMIN — Medication 11 UNIT(S): at 09:15

## 2022-10-26 RX ADMIN — HEPARIN SODIUM 5000 UNIT(S): 5000 INJECTION INTRAVENOUS; SUBCUTANEOUS at 20:53

## 2022-10-26 RX ADMIN — HEPARIN SODIUM 5000 UNIT(S): 5000 INJECTION INTRAVENOUS; SUBCUTANEOUS at 06:00

## 2022-10-26 RX ADMIN — CARVEDILOL PHOSPHATE 25 MILLIGRAM(S): 80 CAPSULE, EXTENDED RELEASE ORAL at 06:01

## 2022-10-26 RX ADMIN — GABAPENTIN 300 MILLIGRAM(S): 400 CAPSULE ORAL at 06:02

## 2022-10-26 RX ADMIN — ERYTHROPOIETIN 10000 UNIT(S): 10000 INJECTION, SOLUTION INTRAVENOUS; SUBCUTANEOUS at 17:02

## 2022-10-26 RX ADMIN — Medication 2: at 20:49

## 2022-10-26 RX ADMIN — Medication 81 MILLIGRAM(S): at 12:25

## 2022-10-26 RX ADMIN — Medication 12 UNIT(S): at 12:24

## 2022-10-26 RX ADMIN — MEROPENEM 100 MILLIGRAM(S): 1 INJECTION INTRAVENOUS at 06:16

## 2022-10-26 RX ADMIN — ATORVASTATIN CALCIUM 80 MILLIGRAM(S): 80 TABLET, FILM COATED ORAL at 22:02

## 2022-10-26 RX ADMIN — Medication 1: at 22:58

## 2022-10-26 RX ADMIN — Medication 20 MILLIGRAM(S): at 06:02

## 2022-10-26 RX ADMIN — TAMSULOSIN HYDROCHLORIDE 0.4 MILLIGRAM(S): 0.4 CAPSULE ORAL at 22:01

## 2022-10-26 RX ADMIN — Medication 4: at 12:25

## 2022-10-26 RX ADMIN — Medication 12 UNIT(S): at 20:57

## 2022-10-26 RX ADMIN — Medication 3: at 09:16

## 2022-10-26 RX ADMIN — GABAPENTIN 300 MILLIGRAM(S): 400 CAPSULE ORAL at 20:52

## 2022-10-26 RX ADMIN — INSULIN GLARGINE 30 UNIT(S): 100 INJECTION, SOLUTION SUBCUTANEOUS at 22:57

## 2022-10-26 RX ADMIN — CARVEDILOL PHOSPHATE 25 MILLIGRAM(S): 80 CAPSULE, EXTENDED RELEASE ORAL at 20:52

## 2022-10-26 NOTE — PROGRESS NOTE ADULT - SUBJECTIVE AND OBJECTIVE BOX
afberile    REVIEW OF SYSTEMS:  GEN: no fever,    no chills  RESP: no SOB,   no cough  CVS: no chest pain,   no palpitations  GI: no abdominal pain,   no nausea,   no vomiting,   no constipation,   no diarrhea  : no dysuria,   no frequency  NEURO: no headache,   no dizziness  PSYCH: no depression,   not anxious  Derm : no rash    MEDICATIONS  (STANDING):  allopurinol 100 milliGRAM(s) Oral daily  aspirin enteric coated 81 milliGRAM(s) Oral daily  atorvastatin 80 milliGRAM(s) Oral at bedtime  carvedilol 25 milliGRAM(s) Oral every 12 hours  colchicine 0.3 milliGRAM(s) Oral every 48 hours  dextrose 5%. 1000 milliLiter(s) (50 mL/Hr) IV Continuous <Continuous>  dextrose 5%. 1000 milliLiter(s) (100 mL/Hr) IV Continuous <Continuous>  dextrose 50% Injectable 25 Gram(s) IV Push once  dextrose 50% Injectable 12.5 Gram(s) IV Push once  dextrose 50% Injectable 25 Gram(s) IV Push once  dextrose Oral Gel 15 Gram(s) Oral once  epoetin wayne-epbx (RETACRIT) Injectable 17029 Unit(s) IV Push once  gabapentin 300 milliGRAM(s) Oral two times a day  glucagon  Injectable 1 milliGRAM(s) IntraMuscular once  heparin   Injectable 5000 Unit(s) SubCutaneous every 12 hours  insulin glargine Injectable (LANTUS) 26 Unit(s) SubCutaneous at bedtime  insulin lispro (ADMELOG) corrective regimen sliding scale   SubCutaneous three times a day before meals  insulin lispro (ADMELOG) corrective regimen sliding scale   SubCutaneous at bedtime  insulin lispro Injectable (ADMELOG) 11 Unit(s) SubCutaneous three times a day before meals  lidocaine/prilocaine Cream 1 Application(s) Topical <User Schedule>  meropenem  IVPB 1000 milliGRAM(s) IV Intermittent every 12 hours  predniSONE   Tablet 20 milliGRAM(s) Oral daily  sodium chloride 0.9%. 1000 milliLiter(s) (100 mL/Hr) IV Continuous <Continuous>  tamsulosin 0.4 milliGRAM(s) Oral at bedtime    MEDICATIONS  (PRN):  oxycodone    5 mG/acetaminophen 325 mG 1 Tablet(s) Oral every 12 hours PRN Moderate Pain (4 - 6)      Vital Signs Last 24 Hrs  T(C): 36.7 (26 Oct 2022 04:57), Max: 36.8 (25 Oct 2022 08:16)  T(F): 98.1 (26 Oct 2022 04:57), Max: 98.3 (25 Oct 2022 19:50)  HR: 76 (26 Oct 2022 04:57) (76 - 83)  BP: 143/79 (26 Oct 2022 04:57) (127/67 - 159/82)  BP(mean): --  RR: 18 (26 Oct 2022 04:57) (17 - 18)  SpO2: 96% (26 Oct 2022 04:57) (92% - 97%)    Parameters below as of 26 Oct 2022 04:57  Patient On (Oxygen Delivery Method): room air      CAPILLARY BLOOD GLUCOSE      POCT Blood Glucose.: 226 mg/dL (25 Oct 2022 23:04)  POCT Blood Glucose.: 212 mg/dL (25 Oct 2022 20:01)  POCT Blood Glucose.: 288 mg/dL (25 Oct 2022 12:24)  POCT Blood Glucose.: 301 mg/dL (25 Oct 2022 08:19)    I&O's Summary    25 Oct 2022 07:01  -  26 Oct 2022 07:00  --------------------------------------------------------  IN: 340 mL / OUT: 500 mL / NET: -160 mL        PHYSICAL EXAM:  HEAD:  Atraumatic, Normocephalic  NECK: Supple, No   JVD  CHEST/LUNG:   no     rales,     no,    rhonchi  HEART: Regular rate and rhythm;         murmur  ABDOMEN: Soft, Nontender, ;   EXTREMITIES:    no    edema  NEUROLOGY:  alert    LABS:                        10.0   8.56  )-----------( 316      ( 26 Oct 2022 07:20 )             31.6     10-25    131<L>  |  92<L>  |  107<H>  ----------------------------<  310<H>  4.8   |  19<L>  |  7.72<H>    Ca    8.5      25 Oct 2022 07:32                              Consultant(s) Notes Reviewed:      Care Discussed with Consultants/Other Providers:

## 2022-10-26 NOTE — PROGRESS NOTE ADULT - SUBJECTIVE AND OBJECTIVE BOX
[facility]OLOGY[facility] [phone]        Patient seen and examined in bed.  He was the same         MEDICATIONS  (STANDING):  allopurinol 100 milliGRAM(s) Oral daily  aspirin enteric coated 81 milliGRAM(s) Oral daily  atorvastatin 80 milliGRAM(s) Oral at bedtime  carvedilol 25 milliGRAM(s) Oral every 12 hours  colchicine 0.3 milliGRAM(s) Oral every 48 hours  dextrose 5%. 1000 milliLiter(s) (50 mL/Hr) IV Continuous <Continuous>  dextrose 5%. 1000 milliLiter(s) (100 mL/Hr) IV Continuous <Continuous>  dextrose 50% Injectable 25 Gram(s) IV Push once  dextrose 50% Injectable 12.5 Gram(s) IV Push once  dextrose 50% Injectable 25 Gram(s) IV Push once  dextrose Oral Gel 15 Gram(s) Oral once  epoetin wayne-epbx (RETACRIT) Injectable 90710 Unit(s) IV Push once  gabapentin 300 milliGRAM(s) Oral two times a day  glucagon  Injectable 1 milliGRAM(s) IntraMuscular once  heparin   Injectable 5000 Unit(s) SubCutaneous every 12 hours  insulin glargine Injectable (LANTUS) 26 Unit(s) SubCutaneous at bedtime  insulin lispro (ADMELOG) corrective regimen sliding scale   SubCutaneous three times a day before meals  insulin lispro (ADMELOG) corrective regimen sliding scale   SubCutaneous at bedtime  insulin lispro Injectable (ADMELOG) 11 Unit(s) SubCutaneous three times a day before meals  lidocaine/prilocaine Cream 1 Application(s) Topical <User Schedule>  predniSONE   Tablet 20 milliGRAM(s) Oral daily  sodium chloride 0.9%. 1000 milliLiter(s) (100 mL/Hr) IV Continuous <Continuous>  tamsulosin 0.4 milliGRAM(s) Oral at bedtime      VITAL:  T(C): , Max: 36.8 (10-25-22 @ 19:50)  T(F): , Max: 98.3 (10-25-22 @ 19:50)  HR: 76 (10-26-22 @ 04:57)  BP: 143/79 (10-26-22 @ 04:57)  BP(mean): --  RR: 18 (10-26-22 @ 04:57)  SpO2: 96% (10-26-22 @ 04:57)  Wt(kg): --    I and O's:    10-25 @ 07:01  -  10-26 @ 07:00  --------------------------------------------------------  IN: 340 mL / OUT: 500 mL / NET: -160 mL          PHYSICAL EXAM:    Constitutional: NAD  Neck:  No JVD  Respiratory: CTAB/L  Cardiovascular: S1 and S2  Gastrointestinal: BS+, soft, NT/ND  Extremities: No peripheral edema  Neurological:   no focal deficits  Psychiatric: Normal mood, normal affect  : No Jay  Skin: No rashes  Access: avf     LABS:                        10.0   8.56  )-----------( 316      ( 26 Oct 2022 07:20 )             31.6     10-26    135  |  95<L>  |  95<H>  ----------------------------<  239<H>  4.2   |  23  |  6.16<H>    Ca    8.2<L>      26 Oct 2022 07:15            Urine Studies:          RADIOLOGY & ADDITIONAL STUDIES:

## 2022-10-26 NOTE — PROGRESS NOTE ADULT - SUBJECTIVE AND OBJECTIVE BOX
Chief complaint  Patient is a 62y old  Male who presents with a chief complaint of fevers (26 Oct 2022 12:00)   Review of systems  Patient in bed, looks comfortable, no hypoglycemic episodes.    Labs and Fingersticks  CAPILLARY BLOOD GLUCOSE      POCT Blood Glucose.: 315 mg/dL (26 Oct 2022 12:14)  POCT Blood Glucose.: 269 mg/dL (26 Oct 2022 08:52)  POCT Blood Glucose.: 226 mg/dL (25 Oct 2022 23:04)  POCT Blood Glucose.: 212 mg/dL (25 Oct 2022 20:01)      Anion Gap, Serum: 17 (10-26 @ 07:15)  Anion Gap, Serum: 20 *H* (10-25 @ 07:32)      Calcium, Total Serum: 8.2 *L* (10-26 @ 07:15)  Calcium, Total Serum: 8.5 (10-25 @ 07:32)          10-26    135  |  95<L>  |  95<H>  ----------------------------<  239<H>  4.2   |  23  |  6.16<H>    Ca    8.2<L>      26 Oct 2022 07:15                          10.0   8.56  )-----------( 316      ( 26 Oct 2022 07:20 )             31.6     Medications  MEDICATIONS  (STANDING):  allopurinol 100 milliGRAM(s) Oral daily  aspirin enteric coated 81 milliGRAM(s) Oral daily  atorvastatin 80 milliGRAM(s) Oral at bedtime  carvedilol 25 milliGRAM(s) Oral every 12 hours  colchicine 0.3 milliGRAM(s) Oral every 48 hours  dextrose 5%. 1000 milliLiter(s) (50 mL/Hr) IV Continuous <Continuous>  dextrose 5%. 1000 milliLiter(s) (100 mL/Hr) IV Continuous <Continuous>  dextrose 50% Injectable 25 Gram(s) IV Push once  dextrose 50% Injectable 12.5 Gram(s) IV Push once  dextrose 50% Injectable 25 Gram(s) IV Push once  dextrose Oral Gel 15 Gram(s) Oral once  epoetin wayne-epbx (RETACRIT) Injectable 64516 Unit(s) IV Push once  gabapentin 300 milliGRAM(s) Oral two times a day  glucagon  Injectable 1 milliGRAM(s) IntraMuscular once  heparin   Injectable 5000 Unit(s) SubCutaneous every 12 hours  insulin glargine Injectable (LANTUS) 30 Unit(s) SubCutaneous at bedtime  insulin lispro (ADMELOG) corrective regimen sliding scale   SubCutaneous three times a day before meals  insulin lispro (ADMELOG) corrective regimen sliding scale   SubCutaneous at bedtime  insulin lispro Injectable (ADMELOG) 12 Unit(s) SubCutaneous three times a day before meals  lidocaine/prilocaine Cream 1 Application(s) Topical <User Schedule>  predniSONE   Tablet 20 milliGRAM(s) Oral daily  sodium chloride 0.9%. 1000 milliLiter(s) (100 mL/Hr) IV Continuous <Continuous>  tamsulosin 0.4 milliGRAM(s) Oral at bedtime      Physical Exam  General: Patient comfortable in bed  Vital Signs Last 12 Hrs  T(F): 98.4 (10-26-22 @ 09:34), Max: 98.4 (10-26-22 @ 09:34)  HR: 75 (10-26-22 @ 09:34) (75 - 76)  BP: 158/78 (10-26-22 @ 09:34) (143/79 - 158/78)  BP(mean): --  RR: 18 (10-26-22 @ 09:34) (18 - 18)  SpO2: 93% (10-26-22 @ 09:34) (93% - 96%)  Neck: No palpable thyroid nodules.  CVS: S1S2, No murmurs  Respiratory: No wheezing, no crepitations  GI: Abdomen soft, bowel sounds positive  Musculoskeletal:  edema lower extremities.   Skin: No skin rashes, no ecchymosis    Diagnostics             Chief complaint  Patient is a 62y old  Male who presents with a chief complaint of fevers (26 Oct 2022 12:00)   Review of systems  Patient in bed, looks comfortable, no hypoglycemic episodes.    Labs and Fingersticks  CAPILLARY BLOOD GLUCOSE      POCT Blood Glucose.: 315 mg/dL (26 Oct 2022 12:14)  POCT Blood Glucose.: 269 mg/dL (26 Oct 2022 08:52)  POCT Blood Glucose.: 226 mg/dL (25 Oct 2022 23:04)  POCT Blood Glucose.: 212 mg/dL (25 Oct 2022 20:01)      Anion Gap, Serum: 17 (10-26 @ 07:15)  Anion Gap, Serum: 20 *H* (10-25 @ 07:32)      Calcium, Total Serum: 8.2 *L* (10-26 @ 07:15)  Calcium, Total Serum: 8.5 (10-25 @ 07:32)          10-26    135  |  95<L>  |  95<H>  ----------------------------<  239<H>  4.2   |  23  |  6.16<H>    Ca    8.2<L>      26 Oct 2022 07:15                          10.0   8.56  )-----------( 316      ( 26 Oct 2022 07:20 )             31.6     Medications  MEDICATIONS  (STANDING):  allopurinol 100 milliGRAM(s) Oral daily  aspirin enteric coated 81 milliGRAM(s) Oral daily  atorvastatin 80 milliGRAM(s) Oral at bedtime  carvedilol 25 milliGRAM(s) Oral every 12 hours  colchicine 0.3 milliGRAM(s) Oral every 48 hours  dextrose 5%. 1000 milliLiter(s) (50 mL/Hr) IV Continuous <Continuous>  dextrose 5%. 1000 milliLiter(s) (100 mL/Hr) IV Continuous <Continuous>  dextrose 50% Injectable 25 Gram(s) IV Push once  dextrose 50% Injectable 12.5 Gram(s) IV Push once  dextrose 50% Injectable 25 Gram(s) IV Push once  dextrose Oral Gel 15 Gram(s) Oral once  epoetin wayne-epbx (RETACRIT) Injectable 68838 Unit(s) IV Push once  gabapentin 300 milliGRAM(s) Oral two times a day  glucagon  Injectable 1 milliGRAM(s) IntraMuscular once  heparin   Injectable 5000 Unit(s) SubCutaneous every 12 hours  insulin glargine Injectable (LANTUS) 30 Unit(s) SubCutaneous at bedtime  insulin lispro (ADMELOG) corrective regimen sliding scale   SubCutaneous three times a day before meals  insulin lispro (ADMELOG) corrective regimen sliding scale   SubCutaneous at bedtime  insulin lispro Injectable (ADMELOG) 12 Unit(s) SubCutaneous three times a day before meals  lidocaine/prilocaine Cream 1 Application(s) Topical <User Schedule>  predniSONE   Tablet 20 milliGRAM(s) Oral daily  sodium chloride 0.9%. 1000 milliLiter(s) (100 mL/Hr) IV Continuous <Continuous>  tamsulosin 0.4 milliGRAM(s) Oral at bedtime      Physical Exam  General: Patient comfortable in bed  Vital Signs Last 12 Hrs  T(F): 98.4 (10-26-22 @ 09:34), Max: 98.4 (10-26-22 @ 09:34)  HR: 75 (10-26-22 @ 09:34) (75 - 76)  BP: 158/78 (10-26-22 @ 09:34) (143/79 - 158/78)  BP(mean): --  RR: 18 (10-26-22 @ 09:34) (18 - 18)  SpO2: 93% (10-26-22 @ 09:34) (93% - 96%)  Neck: No palpable thyroid nodules.  CVS: S1S2, No murmurs  Respiratory: No wheezing, no crepitations  GI: Abdomen soft, bowel sounds positive  Musculoskeletal:  edema lower extremities.   Skin: No skin rashes, no ecchymosis    Diagnostics

## 2022-10-26 NOTE — PROGRESS NOTE ADULT - ASSESSMENT
(1)CKD - stage 5 - diabetic nephropathy  (2)LEYDA - prerenally mediated  (3)Fatigue/weakness - likely at least in part from uremia.        RECOMMEND:  (1) HD #2 today   (2)Off IVF;    (3)Dose new meds for GFR <10     DW Wife   Care coordination was contacted re outpt referral for HD       [facility]ayed [facility]   [phone]

## 2022-10-26 NOTE — PROGRESS NOTE ADULT - ASSESSMENT
Assessment  DMT2: 62y Male with DM T2 with hyperglycemia admitted with weakness, was on insulin at home, now on basal bolus insulin, increased dose yesterday, blood sugars are still running high and not  at target, no hypoglycemic episodes, eating meals, remains on prednisone.  CAD: on medications, monitored, asymptomatic.  HTN: On antihypertensive medications, monitored, asymptomatic.  CKD:  labs, chart reviewed.                  Rubén Escoto MD  Cell:  [phone]  Office: [phone]    Rachel HSORT  Cell:   Office: [phone]               Assessment  DMT2: 62y Male with DM T2 with hyperglycemia admitted with weakness, was on insulin at home, now on basal bolus insulin, increased dose yesterday, blood sugars are still running high and not  at target, no hypoglycemic  episodes, eating meals, remains on prednisone.  CAD: on medications, monitored, asymptomatic.  HTN: On antihypertensive medications, monitored, asymptomatic.  CKD:  labs, chart reviewed.                  Rubén Escoto MD  Cell:  [phone]  Office: [phone]    Rachel SHORT  Cell:   Office: [phone]

## 2022-10-26 NOTE — PROGRESS NOTE ADULT - ASSESSMENT
62 yomhx of CKD,   s/p DKA, DM. cardiomyopathy,  CAD w/ stents, HTN, gout   p/w days of generalized weakness and AMS. states weakness worse than baseline. On Pe cognitive deficits Right Nl flattening (? from chronic left Bg infarct)  distal atrophy, propioceptive loss trace reflexes with equivocal toes withdrawal vs upgoing CTH as above    Impression: chronic advanced Neuropathy worsened by medical illness    MRI Brain and C-spine w/o con pending  read. No acute findings to my eyes  B12. folate, TSH methylmalonic acid  Outpt EMG  Can follow up with Neurology, Dr. Sidney Baca at [phone]

## 2022-10-26 NOTE — OCCUPATIONAL THERAPY INITIAL EVALUATION ADULT - PERTINENT HX OF CURRENT PROBLEM, REHAB EVAL
62 yom hx of CKD,   s/p DKA, DM, cardiomyopathy,  CAD w/ stents, HTN, gout p/w days of generalized weakness and AMS. Per wife, pt was just in [facility] for DKA. d/c'd w/ fever and still weak. Since tuesday, pt becoming more lethargic and altered especially when he waking up in morning. glucose have been in the 200s since per cont BG monitor. Subjective fevers at home, Question of if pt has heather compliant w/ short and long acting insulin as wife works nights and is not there to give all meds at home.  Unclear if pt has been falling at home but w/ bruising on his abdomen  Hospital course: CXR: Clear lungs. No pleural effusions or pneumothorax. Heart size and mediastinal width inaccurately assessed on the projection. Redemonstrated left coronary stent and left PA region cardioMEMS device. Trachea midline. Unremarkable osseous structures. CT Chest: Mild bladder wall thickening. CTH: Mild periventricular white matter ischemia with old lacunar  infarction LEFT basal ganglia. 4.1 x 2.4 cm arachnoid cyst again noted in the anterior LEFT middle cranial fossa. RUE doppler: (-) DVT

## 2022-10-26 NOTE — PROGRESS NOTE ADULT - SUBJECTIVE AND OBJECTIVE BOX
CARDIOLOGY     PROGRESS  NOTE   ________________________________________________    CHIEF COMPLAINT:Patient is a 62y old  Male who presents with a chief complaint of fevers (25 Oct 2022 16:22)  doing better  	  REVIEW OF SYSTEMS:  CONSTITUTIONAL: No fever, weight loss, or fatigue  EYES: No eye pain, visual disturbances, or discharge  ENT:  No difficulty hearing, tinnitus, vertigo; No sinus or throat pain  NECK: No pain or stiffness  RESPIRATORY: No cough, wheezing, chills or hemoptysis; + mild Shortness of Breath  CARDIOVASCULAR: No chest pain, palpitations, passing out, dizziness, or leg swelling  GASTROINTESTINAL: No abdominal or epigastric pain. No nausea, vomiting, or hematemesis; No diarrhea or constipation. No melena or hematochezia.  GENITOURINARY: No dysuria, frequency, hematuria, or incontinence  NEUROLOGICAL: No headaches, memory loss, loss of strength, numbness, or tremors  SKIN: No itching, burning, rashes, or lesions   LYMPH Nodes: No enlarged glands  ENDOCRINE: No heat or cold intolerance; No hair loss  MUSCULOSKELETAL: No joint pain or swelling; No muscle, back, or extremity pain  PSYCHIATRIC: No depression, anxiety, mood swings, or difficulty sleeping  HEME/LYMPH: No easy bruising, or bleeding gums  ALLERGY AND IMMUNOLOGIC: No hives or eczema	    [ ] All others negative	  [ ] Unable to obtain    PHYSICAL EXAM:  T(C): 36.7 (10-26-22 @ 04:57), Max: 36.8 (10-25-22 @ 08:16)  HR: 76 (10-26-22 @ 04:57) (76 - 83)  BP: 143/79 (10-26-22 @ 04:57) (127/67 - 159/82)  RR: 18 (10-26-22 @ 04:57) (17 - 18)  SpO2: 96% (10-26-22 @ 04:57) (92% - 97%)  Wt(kg): --  I&O's Summary    25 Oct 2022 07:01  -  26 Oct 2022 07:00  --------------------------------------------------------  IN: 340 mL / OUT: 500 mL / NET: -160 mL        Appearance: Normal	  HEENT:   Normal oral mucosa, PERRL, EOMI	  Lymphatic: No lymphadenopathy  Cardiovascular: Normal S1 S2, No JVD, + murmurs, No edema  Respiratory: rhonchi  Psychiatry: A & O x 3, Mood & affect appropriate  Gastrointestinal:  Soft, Non-tender, + BS	  Skin: No rashes, No ecchymoses, No cyanosis	  Neurologic: as per neuro  Extremities: Normal range of motion, No clubbing, cyanosis or edema  Vascular: Peripheral pulses palpable 2+ bilaterally    MEDICATIONS  (STANDING):  allopurinol 100 milliGRAM(s) Oral daily  aspirin enteric coated 81 milliGRAM(s) Oral daily  atorvastatin 80 milliGRAM(s) Oral at bedtime  carvedilol 25 milliGRAM(s) Oral every 12 hours  colchicine 0.3 milliGRAM(s) Oral every 48 hours  dextrose 5%. 1000 milliLiter(s) (50 mL/Hr) IV Continuous <Continuous>  dextrose 5%. 1000 milliLiter(s) (100 mL/Hr) IV Continuous <Continuous>  dextrose 50% Injectable 25 Gram(s) IV Push once  dextrose 50% Injectable 12.5 Gram(s) IV Push once  dextrose 50% Injectable 25 Gram(s) IV Push once  dextrose Oral Gel 15 Gram(s) Oral once  epoetin wayne-epbx (RETACRIT) Injectable 02568 Unit(s) IV Push once  gabapentin 300 milliGRAM(s) Oral two times a day  glucagon  Injectable 1 milliGRAM(s) IntraMuscular once  heparin   Injectable 5000 Unit(s) SubCutaneous every 12 hours  insulin glargine Injectable (LANTUS) 26 Unit(s) SubCutaneous at bedtime  insulin lispro (ADMELOG) corrective regimen sliding scale   SubCutaneous three times a day before meals  insulin lispro (ADMELOG) corrective regimen sliding scale   SubCutaneous at bedtime  insulin lispro Injectable (ADMELOG) 11 Unit(s) SubCutaneous three times a day before meals  lidocaine/prilocaine Cream 1 Application(s) Topical <User Schedule>  meropenem  IVPB 1000 milliGRAM(s) IV Intermittent every 12 hours  predniSONE   Tablet 20 milliGRAM(s) Oral daily  sodium chloride 0.9%. 1000 milliLiter(s) (100 mL/Hr) IV Continuous <Continuous>  tamsulosin 0.4 milliGRAM(s) Oral at bedtime      TELEMETRY: 	    ECG:  	  RADIOLOGY:  OTHER: 	  	  LABS:	 	    CARDIAC MARKERS:                                9.7    8.62  )-----------( 302      ( 25 Oct 2022 07:31 )             31.4     10-25    131<L>  |  92<L>  |  107<H>  ----------------------------<  310<H>  4.8   |  19<L>  |  7.72<H>    Ca    8.5      25 Oct 2022 07:32      proBNP:   Lipid Profile:   HgA1c:   TSH:     (1)Start HD today   (2)Off IVF;    (3)Dose new meds for GFR <10     MRI Brain and C-spine w/o con  B12. folate, TSH methylmalonic acid  Outpt EMG    Assessment and plan  ---------------------------  62 yom hx of CKD, DKA, DMtype2, cardiomyopathy w/ stents, HTN, gout p/w days of generalized weakness and AMS. Per wife, pt was just in [facility] for DKA. Was d/c'd w/ fever and still weak. Since tuesday, pt becoming more lethargic and altered especially when he waking up in morning. BG have been in the 200s since per cont BG monitor. Subjective fevers at home, wife feels that his abd is larger than it was before d/c from [facility]. Question of if pt has heather compliant w/ short and long acting insulin as wife works nights and is not there to give all meds at home. Unclear if pt has been falling at home but w/ bruising on his abd.   pt with hx of ashd, chf, cardiomyopathy, dm, s/p CARDIOMEM fu at [facility] with change of mental stratus and sob.  ct scan noted , no active chf  will adjust cardiac meds  will call chf team at [facility] to get Cardiomem readings  beta blocker/ hydralazine and Nitrate  ckd, renal follow up  check pro bnp  dvt prophylaxis  pt with known hx of cad, last stress test  in 07/2021, no ischemia with scar in mary septal and apical area  ct chest abdomen and pelvis noted, no pleural effusion or sign of chf  renal increased renal function last admission CR 4.8  continue asa daily /coreg  will add hydralazine and nitrate as tolerated  renal /ID noted  ?metabolic encephalopathy, but pt responds to the question appropriately, r sided weakness , neuro noted  avoid excess fluid, may dc ivf, hx of chf , EF40%  s/p HD yesterday  dc colchicine  awaiting brain MRI

## 2022-10-26 NOTE — PROGRESS NOTE ADULT - SUBJECTIVE AND OBJECTIVE BOX
Patient seen and examined this am. No new events    MEDICATIONS:    allopurinol 100 milliGRAM(s) Oral daily  aspirin enteric coated 81 milliGRAM(s) Oral daily  atorvastatin 80 milliGRAM(s) Oral at bedtime  carvedilol 25 milliGRAM(s) Oral every 12 hours  colchicine 0.3 milliGRAM(s) Oral every 48 hours  dextrose 5%. 1000 milliLiter(s) IV Continuous <Continuous>  dextrose 5%. 1000 milliLiter(s) IV Continuous <Continuous>  dextrose 50% Injectable 25 Gram(s) IV Push once  dextrose 50% Injectable 12.5 Gram(s) IV Push once  dextrose 50% Injectable 25 Gram(s) IV Push once  dextrose Oral Gel 15 Gram(s) Oral once  epoetin wayne-epbx (RETACRIT) Injectable 35954 Unit(s) IV Push once  gabapentin 300 milliGRAM(s) Oral two times a day  glucagon  Injectable 1 milliGRAM(s) IntraMuscular once  heparin   Injectable 5000 Unit(s) SubCutaneous every 12 hours  insulin glargine Injectable (LANTUS) 30 Unit(s) SubCutaneous at bedtime  insulin lispro (ADMELOG) corrective regimen sliding scale   SubCutaneous three times a day before meals  insulin lispro (ADMELOG) corrective regimen sliding scale   SubCutaneous at bedtime  insulin lispro Injectable (ADMELOG) 12 Unit(s) SubCutaneous three times a day before meals  lidocaine/prilocaine Cream 1 Application(s) Topical <User Schedule>  oxycodone    5 mG/acetaminophen 325 mG 1 Tablet(s) Oral every 12 hours PRN  predniSONE   Tablet 20 milliGRAM(s) Oral daily  sodium chloride 0.9%. 1000 milliLiter(s) IV Continuous <Continuous>  tamsulosin 0.4 milliGRAM(s) Oral at bedtime      LABS:                          10.0   8.56  )-----------( 316      ( 26 Oct 2022 07:20 )             31.6     10-26    135  |  95<L>  |  95<H>  ----------------------------<  239<H>  4.2   |  23  |  6.16<H>    Ca    8.2<L>      26 Oct 2022 07:15      CAPILLARY BLOOD GLUCOSE      POCT Blood Glucose.: 269 mg/dL (26 Oct 2022 08:52)  POCT Blood Glucose.: 226 mg/dL (25 Oct 2022 23:04)  POCT Blood Glucose.: 212 mg/dL (25 Oct 2022 20:01)  POCT Blood Glucose.: 288 mg/dL (25 Oct 2022 12:24)        I&O's Summary    25 Oct 2022 07:01  -  26 Oct 2022 07:00  --------------------------------------------------------  IN: 340 mL / OUT: 500 mL / NET: -160 mL      Vital Signs Last 24 Hrs  T(C): 36.9 (26 Oct 2022 09:34), Max: 36.9 (26 Oct 2022 09:34)  T(F): 98.4 (26 Oct 2022 09:34), Max: 98.4 (26 Oct 2022 09:34)  HR: 75 (26 Oct 2022 09:34) (75 - 81)  BP: 158/78 (26 Oct 2022 09:34) (127/67 - 159/82)  BP(mean): --  RR: 18 (26 Oct 2022 09:34) (17 - 18)  SpO2: 93% (26 Oct 2022 09:34) (93% - 97%)    Parameters below as of 26 Oct 2022 09:34  Patient On (Oxygen Delivery Method): room air          On Neurological Examination:    Mental Status - Patient is alert, awake, oriented X2     Cranial Nerves - PERRL, EOMI, VFF, V1-V3 intact, right Nl flattening, tongue/uvula midline    Motor Exam -   distal atrophy at least 4-/5 thtoughout distal atrophy    Sensory    Intact to light touch absent propioception in toes    Coord: dysmetria bilaterally     Gait -  deferred    Reflexes:         trace throughout toes equivocal                                                    RADIOLOGY  < from: CT Head No Cont (10.23.22 @ 20:36) >  IMPRESSION:   Mild periventricular white matter ischemia with old lacunar   infarction LEFT basal ganglia. 4.1 x 2.4 cm arachnoid cyst again noted in   the anterior LEFT middle cranial fossa.    < end of copied text >

## 2022-10-26 NOTE — PROGRESS NOTE ADULT - ASSESSMENT
61 yo male with a history of DM, ischemic cardiomyopathy, CAD,HTN, gout, CKD, and DM.  He was recently hospitalized at [facility] for hyperglycemia.  Low grade fever with a toxic metabolic encephalopathy.  His blood and urine cultures have been negative.  Serum crypt antigen negative.  CT scan of C/A/P without a source of infection outside of a thickened bladder(negative urine culture)  Neurology w/u in progress  His fevers have resolved  Will d/c meropenem this AM as planned  ? Metabolic encephalopathy  Additional w/u pending course  Prior fevers may have reflected gout  He is awaiting MRI's of neuroaxis

## 2022-10-26 NOTE — OCCUPATIONAL THERAPY INITIAL EVALUATION ADULT - ADDITIONAL COMMENTS
Pt lives in pvt home +3STE +flight inside with wife and children. Pt has a walk in shower, owns a RW and cane. PTA was independent with ADLs, functional mobility, not using AE/DME.

## 2022-10-26 NOTE — PROGRESS NOTE ADULT - SUBJECTIVE AND OBJECTIVE BOX
CC: f/u for fever and encephalopathy    Patient reports: he is more alert this AM, c/o discomfort on dorsal surface of rt foot, ? neuropathy    REVIEW OF SYSTEMS:  All other review of systems negative (Comprehensive ROS)    Antimicrobials Day #  :day 4  meropenem  IVPB 1000 milliGRAM(s) IV Intermittent every 12 hours    Other Medications Reviewed  MEDICATIONS  (STANDING):  allopurinol 100 milliGRAM(s) Oral daily  aspirin enteric coated 81 milliGRAM(s) Oral daily  atorvastatin 80 milliGRAM(s) Oral at bedtime  carvedilol 25 milliGRAM(s) Oral every 12 hours  colchicine 0.3 milliGRAM(s) Oral every 48 hours  dextrose 5%. 1000 milliLiter(s) (50 mL/Hr) IV Continuous <Continuous>  dextrose 5%. 1000 milliLiter(s) (100 mL/Hr) IV Continuous <Continuous>  dextrose 50% Injectable 25 Gram(s) IV Push once  dextrose 50% Injectable 12.5 Gram(s) IV Push once  dextrose 50% Injectable 25 Gram(s) IV Push once  dextrose Oral Gel 15 Gram(s) Oral once  epoetin wayne-epbx (RETACRIT) Injectable 28523 Unit(s) IV Push once  gabapentin 300 milliGRAM(s) Oral two times a day  glucagon  Injectable 1 milliGRAM(s) IntraMuscular once  heparin   Injectable 5000 Unit(s) SubCutaneous every 12 hours  insulin glargine Injectable (LANTUS) 26 Unit(s) SubCutaneous at bedtime  insulin lispro (ADMELOG) corrective regimen sliding scale   SubCutaneous three times a day before meals  insulin lispro (ADMELOG) corrective regimen sliding scale   SubCutaneous at bedtime  insulin lispro Injectable (ADMELOG) 11 Unit(s) SubCutaneous three times a day before meals  lidocaine/prilocaine Cream 1 Application(s) Topical <User Schedule>  meropenem  IVPB 1000 milliGRAM(s) IV Intermittent every 12 hours  predniSONE   Tablet 20 milliGRAM(s) Oral daily  sodium chloride 0.9%. 1000 milliLiter(s) (100 mL/Hr) IV Continuous <Continuous>  tamsulosin 0.4 milliGRAM(s) Oral at bedtime    T(F): 98.1 (10-26-22 @ 04:57), Max: 98.3 (10-25-22 @ 19:50)  HR: 76 (10-26-22 @ 04:57)  BP: 143/79 (10-26-22 @ 04:57)  RR: 18 (10-26-22 @ 04:57)  SpO2: 96% (10-26-22 @ 04:57)  Wt(kg): --    PHYSICAL EXAM:  General: alert, no acute distress  Eyes:  anicteric, no conjunctival injection, no discharge  Oropharynx: no lesions or injection 	  Neck: supple, without adenopathy  Lungs: clear to auscultation  Heart: regular rate and rhythm; no murmur, rubs or gallops  Abdomen: soft, nondistended, nontender, without mass or organomegaly  Skin: no lesions  Extremities: no clubbing, cyanosis, or edema  Neurologic: alert, oriented, moves all extremities    LAB RESULTS:                        10.0   8.56  )-----------( 316      ( 26 Oct 2022 07:20 )             31.6     10-26    135  |  95<L>  |  95<H>  ----------------------------<  239<H>  4.2   |  23  |  6.16<H>    Ca    8.2<L>      26 Oct 2022 07:15          MICROBIOLOGY:  RECENT CULTURES:  10-22 @ 12:47 Clean Catch Clean Catch (Midstream)     No growth      10-22 @ 12:32 .Blood Blood-Peripheral     No growth to date.      10-22 @ 12:20 .Blood Blood-Peripheral     No growth to date.          RADIOLOGY REVIEWED:  < from: CT Head No Cont (10.23.22 @ 20:36) >  IMPRESSION:   Mild periventricular white matter ischemia with old lacunar   infarction LEFT basal ganglia. 4.1 x 2.4 cm arachnoid cyst again noted in   the anterior LEFT middle cranial fossa.    --- End of Report ---    < end of copied text >

## 2022-10-26 NOTE — OCCUPATIONAL THERAPY INITIAL EVALUATION ADULT - RANGE OF MOTION EXAMINATION, UPPER EXTREMITY
R shoulder ~90 degrees AROM/Left UE Active ROM was WFL (within functional limits)/Right UE Passive ROM was WNL (within normal limits)

## 2022-10-27 LAB
ANION GAP SERPL CALC-SCNC: 17 MMOL/L — SIGNIFICANT CHANGE UP (ref 5–17)
BUN SERPL-MCNC: 70 MG/DL — HIGH (ref 7–23)
CALCIUM SERPL-MCNC: 8.2 MG/DL — LOW (ref 8.4–10.5)
CHLORIDE SERPL-SCNC: 96 MMOL/L — SIGNIFICANT CHANGE UP (ref 96–108)
CO2 SERPL-SCNC: 22 MMOL/L — SIGNIFICANT CHANGE UP (ref 22–31)
CREAT SERPL-MCNC: 4.91 MG/DL — HIGH (ref 0.5–1.3)
CULTURE RESULTS: SIGNIFICANT CHANGE UP
CULTURE RESULTS: SIGNIFICANT CHANGE UP
EGFR: 13 ML/MIN/1.73M2 — LOW
GLUCOSE BLDC GLUCOMTR-MCNC: 142 MG/DL — HIGH (ref 70–99)
GLUCOSE BLDC GLUCOMTR-MCNC: 213 MG/DL — HIGH (ref 70–99)
GLUCOSE BLDC GLUCOMTR-MCNC: 271 MG/DL — HIGH (ref 70–99)
GLUCOSE BLDC GLUCOMTR-MCNC: 282 MG/DL — HIGH (ref 70–99)
GLUCOSE SERPL-MCNC: 227 MG/DL — HIGH (ref 70–99)
HCT VFR BLD CALC: 33 % — LOW (ref 39–50)
HGB BLD-MCNC: 10.4 G/DL — LOW (ref 13–17)
MCHC RBC-ENTMCNC: 28.7 PG — SIGNIFICANT CHANGE UP (ref 27–34)
MCHC RBC-ENTMCNC: 31.5 GM/DL — LOW (ref 32–36)
MCV RBC AUTO: 90.9 FL — SIGNIFICANT CHANGE UP (ref 80–100)
NRBC # BLD: 0 /100 WBCS — SIGNIFICANT CHANGE UP (ref 0–0)
PLATELET # BLD AUTO: 340 K/UL — SIGNIFICANT CHANGE UP (ref 150–400)
POTASSIUM SERPL-MCNC: 4.2 MMOL/L — SIGNIFICANT CHANGE UP (ref 3.5–5.3)
POTASSIUM SERPL-SCNC: 4.2 MMOL/L — SIGNIFICANT CHANGE UP (ref 3.5–5.3)
RBC # BLD: 3.63 M/UL — LOW (ref 4.2–5.8)
RBC # FLD: 13.9 % — SIGNIFICANT CHANGE UP (ref 10.3–14.5)
SARS-COV-2 RNA SPEC QL NAA+PROBE: SIGNIFICANT CHANGE UP
SODIUM SERPL-SCNC: 135 MMOL/L — SIGNIFICANT CHANGE UP (ref 135–145)
SPECIMEN SOURCE: SIGNIFICANT CHANGE UP
SPECIMEN SOURCE: SIGNIFICANT CHANGE UP
WBC # BLD: 10.07 K/UL — SIGNIFICANT CHANGE UP (ref 3.8–10.5)
WBC # FLD AUTO: 10.07 K/UL — SIGNIFICANT CHANGE UP (ref 3.8–10.5)

## 2022-10-27 RX ORDER — INSULIN LISPRO 100/ML
13 VIAL (ML) SUBCUTANEOUS
Refills: 0 | Status: DISCONTINUED | OUTPATIENT
Start: 2022-10-27 | End: 2022-10-28

## 2022-10-27 RX ORDER — INSULIN GLARGINE 100 [IU]/ML
33 INJECTION, SOLUTION SUBCUTANEOUS AT BEDTIME
Refills: 0 | Status: DISCONTINUED | OUTPATIENT
Start: 2022-10-27 | End: 2022-10-28

## 2022-10-27 RX ADMIN — Medication 81 MILLIGRAM(S): at 12:23

## 2022-10-27 RX ADMIN — Medication 20 MILLIGRAM(S): at 05:25

## 2022-10-27 RX ADMIN — OXYCODONE AND ACETAMINOPHEN 1 TABLET(S): 5; 325 TABLET ORAL at 20:40

## 2022-10-27 RX ADMIN — ATORVASTATIN CALCIUM 80 MILLIGRAM(S): 80 TABLET, FILM COATED ORAL at 21:31

## 2022-10-27 RX ADMIN — CARVEDILOL PHOSPHATE 25 MILLIGRAM(S): 80 CAPSULE, EXTENDED RELEASE ORAL at 05:25

## 2022-10-27 RX ADMIN — Medication 3: at 12:21

## 2022-10-27 RX ADMIN — OXYCODONE AND ACETAMINOPHEN 1 TABLET(S): 5; 325 TABLET ORAL at 20:06

## 2022-10-27 RX ADMIN — Medication 0.3 MILLIGRAM(S): at 17:51

## 2022-10-27 RX ADMIN — HEPARIN SODIUM 5000 UNIT(S): 5000 INJECTION INTRAVENOUS; SUBCUTANEOUS at 05:26

## 2022-10-27 RX ADMIN — INSULIN GLARGINE 33 UNIT(S): 100 INJECTION, SOLUTION SUBCUTANEOUS at 21:31

## 2022-10-27 RX ADMIN — TAMSULOSIN HYDROCHLORIDE 0.4 MILLIGRAM(S): 0.4 CAPSULE ORAL at 21:31

## 2022-10-27 RX ADMIN — Medication 13 UNIT(S): at 12:22

## 2022-10-27 RX ADMIN — Medication 13 UNIT(S): at 09:15

## 2022-10-27 RX ADMIN — Medication 3: at 09:12

## 2022-10-27 RX ADMIN — Medication 13 UNIT(S): at 17:50

## 2022-10-27 RX ADMIN — GABAPENTIN 300 MILLIGRAM(S): 400 CAPSULE ORAL at 17:53

## 2022-10-27 RX ADMIN — HEPARIN SODIUM 5000 UNIT(S): 5000 INJECTION INTRAVENOUS; SUBCUTANEOUS at 17:52

## 2022-10-27 RX ADMIN — CARVEDILOL PHOSPHATE 25 MILLIGRAM(S): 80 CAPSULE, EXTENDED RELEASE ORAL at 17:52

## 2022-10-27 RX ADMIN — GABAPENTIN 300 MILLIGRAM(S): 400 CAPSULE ORAL at 05:26

## 2022-10-27 RX ADMIN — Medication 100 MILLIGRAM(S): at 12:23

## 2022-10-27 NOTE — PROGRESS NOTE ADULT - SUBJECTIVE AND OBJECTIVE BOX
afberile    REVIEW OF SYSTEMS:  GEN: no fever,    no chills  RESP: no SOB,   no cough  CVS: no chest pain,   no palpitations  GI: no abdominal pain,   no nausea,   no vomiting,   no constipation,   no diarrhea  : no dysuria,   no frequency  NEURO: no headache,   no dizziness  PSYCH: no depression,   not anxious  Derm : no rash    MEDICATIONS  (STANDING):  allopurinol 100 milliGRAM(s) Oral daily  aspirin enteric coated 81 milliGRAM(s) Oral daily  atorvastatin 80 milliGRAM(s) Oral at bedtime  carvedilol 25 milliGRAM(s) Oral every 12 hours  colchicine 0.3 milliGRAM(s) Oral every 48 hours  dextrose 5%. 1000 milliLiter(s) (50 mL/Hr) IV Continuous <Continuous>  dextrose 5%. 1000 milliLiter(s) (100 mL/Hr) IV Continuous <Continuous>  dextrose 50% Injectable 25 Gram(s) IV Push once  dextrose 50% Injectable 12.5 Gram(s) IV Push once  dextrose 50% Injectable 25 Gram(s) IV Push once  dextrose Oral Gel 15 Gram(s) Oral once  gabapentin 300 milliGRAM(s) Oral two times a day  glucagon  Injectable 1 milliGRAM(s) IntraMuscular once  heparin   Injectable 5000 Unit(s) SubCutaneous every 12 hours  insulin glargine Injectable (LANTUS) 33 Unit(s) SubCutaneous at bedtime  insulin lispro (ADMELOG) corrective regimen sliding scale   SubCutaneous three times a day before meals  insulin lispro (ADMELOG) corrective regimen sliding scale   SubCutaneous at bedtime  insulin lispro Injectable (ADMELOG) 13 Unit(s) SubCutaneous three times a day before meals  lidocaine/prilocaine Cream 1 Application(s) Topical <User Schedule>  sodium chloride 0.9%. 1000 milliLiter(s) (100 mL/Hr) IV Continuous <Continuous>  tamsulosin 0.4 milliGRAM(s) Oral at bedtime    MEDICATIONS  (PRN):  oxycodone    5 mG/acetaminophen 325 mG 1 Tablet(s) Oral every 12 hours PRN Moderate Pain (4 - 6)      Vital Signs Last 24 Hrs  T(C): 36.5 (27 Oct 2022 05:12), Max: 36.9 (26 Oct 2022 09:34)  T(F): 97.7 (27 Oct 2022 05:12), Max: 98.4 (26 Oct 2022 09:34)  HR: 70 (27 Oct 2022 05:12) (70 - 81)  BP: 147/82 (27 Oct 2022 05:12) (142/76 - 158/78)  BP(mean): --  RR: 18 (27 Oct 2022 05:12) (16 - 18)  SpO2: 98% (27 Oct 2022 05:12) (93% - 98%)    Parameters below as of 26 Oct 2022 20:20  Patient On (Oxygen Delivery Method): room air      CAPILLARY BLOOD GLUCOSE      POCT Blood Glucose.: 293 mg/dL (26 Oct 2022 22:43)  POCT Blood Glucose.: 214 mg/dL (26 Oct 2022 20:32)  POCT Blood Glucose.: 281 mg/dL (26 Oct 2022 16:09)  POCT Blood Glucose.: 315 mg/dL (26 Oct 2022 12:14)  POCT Blood Glucose.: 269 mg/dL (26 Oct 2022 08:52)    I&O's Summary    26 Oct 2022 07:01  -  27 Oct 2022 07:00  --------------------------------------------------------  IN: 300 mL / OUT: 700 mL / NET: -400 mL        PHYSICAL EXAM:  HEAD:  Atraumatic, Normocephalic  NECK: Supple, No   JVD  CHEST/LUNG:   no     rales,     no,    rhonchi  HEART: Regular rate and rhythm;         murmur  ABDOMEN: Soft, Nontender, ;   EXTREMITIES:   no     edema  NEUROLOGY:  alert    LABS:                        10.4   10.07 )-----------( 340      ( 27 Oct 2022 07:10 )             33.0     10-27    135  |  96  |  70<H>  ----------------------------<  227<H>  4.2   |  22  |  4.91<H>    Ca    8.2<L>      27 Oct 2022 07:14                              Consultant(s) Notes Reviewed:      Care Discussed with Consultants/Other Providers:

## 2022-10-27 NOTE — PROGRESS NOTE ADULT - SUBJECTIVE AND OBJECTIVE BOX
NEPHROLOGY-[facility] [phone]        Patient seen and examined in bed.  He was in good spirits         MEDICATIONS  (STANDING):  allopurinol 100 milliGRAM(s) Oral daily  aspirin enteric coated 81 milliGRAM(s) Oral daily  atorvastatin 80 milliGRAM(s) Oral at bedtime  carvedilol 25 milliGRAM(s) Oral every 12 hours  colchicine 0.3 milliGRAM(s) Oral every 48 hours  dextrose 5%. 1000 milliLiter(s) (50 mL/Hr) IV Continuous <Continuous>  dextrose 5%. 1000 milliLiter(s) (100 mL/Hr) IV Continuous <Continuous>  dextrose 50% Injectable 25 Gram(s) IV Push once  dextrose 50% Injectable 12.5 Gram(s) IV Push once  dextrose 50% Injectable 25 Gram(s) IV Push once  dextrose Oral Gel 15 Gram(s) Oral once  gabapentin 300 milliGRAM(s) Oral two times a day  glucagon  Injectable 1 milliGRAM(s) IntraMuscular once  heparin   Injectable 5000 Unit(s) SubCutaneous every 12 hours  insulin glargine Injectable (LANTUS) 33 Unit(s) SubCutaneous at bedtime  insulin lispro (ADMELOG) corrective regimen sliding scale   SubCutaneous three times a day before meals  insulin lispro (ADMELOG) corrective regimen sliding scale   SubCutaneous at bedtime  insulin lispro Injectable (ADMELOG) 13 Unit(s) SubCutaneous three times a day before meals  lidocaine/prilocaine Cream 1 Application(s) Topical <User Schedule>  sodium chloride 0.9%. 1000 milliLiter(s) (100 mL/Hr) IV Continuous <Continuous>  tamsulosin 0.4 milliGRAM(s) Oral at bedtime      VITAL:  T(C): , Max: 36.7 (10-26-22 @ 20:20)  T(F): , Max: 98 (10-26-22 @ 20:20)  HR: 75 (10-27-22 @ 09:24)  BP: 155/77 (10-27-22 @ 09:24)  BP(mean): --  RR: 18 (10-27-22 @ 09:24)  SpO2: 95% (10-27-22 @ 09:24)  Wt(kg): --    I and O's:    10-26 @ 07:01  -  10-27 @ 07:00  --------------------------------------------------------  IN: 300 mL / OUT: 700 mL / NET: -400 mL    10-27 @ 07:01  -  10-27 @ 10:47  --------------------------------------------------------  IN: 240 mL / OUT: 0 mL / NET: 240 mL          PHYSICAL EXAM:    Constitutional: NAD  Neck:  No JVD  Respiratory: CTAB/L  Cardiovascular: S1 and S2  Gastrointestinal: BS+, soft, NT/ND  Extremities: No peripheral edema  Neurological: A/O x 3, no focal deficits  Psychiatric: Normal mood, normal affect  : No Jay  Skin: No rashes  Access: avf    LABS:                        10.4   10.07 )-----------( 340      ( 27 Oct 2022 07:10 )             33.0     10-27    135  |  96  |  70<H>  ----------------------------<  227<H>  4.2   |  22  |  4.91<H>    Ca    8.2<L>      27 Oct 2022 07:14            Urine Studies:          RADIOLOGY & ADDITIONAL STUDIES:

## 2022-10-27 NOTE — PROGRESS NOTE ADULT - ASSESSMENT
Assessment  DMT2: 62y Male with DM T2 with hyperglycemia admitted with weakness, was on insulin at home, now on basal bolus insulin, increased dose yesterday, blood sugars are still running high and not  at target, no hypoglycemic episodes, eating meals, appears comfortable.  CAD: on medications, monitored, asymptomatic.  HTN: On antihypertensive medications, monitored, asymptomatic.  CKD:  labs, chart reviewed.                  Rubén Escoto MD  Cell:  [phone]  Office: [phone]    Rachel SHORT  Cell:   Office: [phone]               Assessment  DMT2: 62y Male with DM T2 with hyperglycemia admitted with weakness, was on insulin at home, now on basal bolus insulin, increased dose yesterday, blood sugars are still  running high and not  at target, no hypoglycemic episodes, eating meals, appears comfortable.  CAD: on medications, monitored, asymptomatic.  HTN: On antihypertensive medications, monitored, asymptomatic.  CKD:  labs, chart reviewed.                  Rubén Escoto MD  Cell:  [phone]  Office: [phone]    Rachel SHORT  Cell:   Office: [phone]

## 2022-10-27 NOTE — PROGRESS NOTE ADULT - SUBJECTIVE AND OBJECTIVE BOX
Chief complaint  Patient is a 62y old  Male who presents with a chief complaint of fevers (27 Oct 2022 10:47)   Review of systems  Patient in bed, looks comfortable, no hypoglycemic episodes.    Labs and Fingersticks  CAPILLARY BLOOD GLUCOSE      POCT Blood Glucose.: 282 mg/dL (27 Oct 2022 12:18)  POCT Blood Glucose.: 271 mg/dL (27 Oct 2022 09:00)  POCT Blood Glucose.: 293 mg/dL (26 Oct 2022 22:43)  POCT Blood Glucose.: 214 mg/dL (26 Oct 2022 20:32)  POCT Blood Glucose.: 281 mg/dL (26 Oct 2022 16:09)      Anion Gap, Serum: 17 (10-27 @ 07:14)  Anion Gap, Serum: 17 (10-26 @ 07:15)      Calcium, Total Serum: 8.2 *L* (10-27 @ 07:14)  Calcium, Total Serum: 8.2 *L* (10-26 @ 07:15)          10-27    135  |  96  |  70<H>  ----------------------------<  227<H>  4.2   |  22  |  4.91<H>    Ca    8.2<L>      27 Oct 2022 07:14                          10.4   10.07 )-----------( 340      ( 27 Oct 2022 07:10 )             33.0     Medications  MEDICATIONS  (STANDING):  allopurinol 100 milliGRAM(s) Oral daily  aspirin enteric coated 81 milliGRAM(s) Oral daily  atorvastatin 80 milliGRAM(s) Oral at bedtime  carvedilol 25 milliGRAM(s) Oral every 12 hours  colchicine 0.3 milliGRAM(s) Oral every 48 hours  dextrose 5%. 1000 milliLiter(s) (50 mL/Hr) IV Continuous <Continuous>  dextrose 5%. 1000 milliLiter(s) (100 mL/Hr) IV Continuous <Continuous>  dextrose 50% Injectable 25 Gram(s) IV Push once  dextrose 50% Injectable 12.5 Gram(s) IV Push once  dextrose 50% Injectable 25 Gram(s) IV Push once  dextrose Oral Gel 15 Gram(s) Oral once  gabapentin 300 milliGRAM(s) Oral two times a day  glucagon  Injectable 1 milliGRAM(s) IntraMuscular once  heparin   Injectable 5000 Unit(s) SubCutaneous every 12 hours  insulin glargine Injectable (LANTUS) 33 Unit(s) SubCutaneous at bedtime  insulin lispro (ADMELOG) corrective regimen sliding scale   SubCutaneous three times a day before meals  insulin lispro (ADMELOG) corrective regimen sliding scale   SubCutaneous at bedtime  insulin lispro Injectable (ADMELOG) 13 Unit(s) SubCutaneous three times a day before meals  lidocaine/prilocaine Cream 1 Application(s) Topical <User Schedule>  sodium chloride 0.9%. 1000 milliLiter(s) (100 mL/Hr) IV Continuous <Continuous>  tamsulosin 0.4 milliGRAM(s) Oral at bedtime      Physical Exam  General: Patient comfortable in bed  Vital Signs Last 12 Hrs  T(F): 98 (10-27-22 @ 09:24), Max: 98 (10-27-22 @ 09:24)  HR: 75 (10-27-22 @ 09:24) (70 - 75)  BP: 155/77 (10-27-22 @ 09:24) (147/82 - 155/77)  BP(mean): --  RR: 18 (10-27-22 @ 09:24) (18 - 18)  SpO2: 95% (10-27-22 @ 09:24) (95% - 98%)  Neck: No palpable thyroid nodules.  CVS: S1S2, No murmurs  Respiratory: No wheezing, no crepitations  GI: Abdomen soft, bowel sounds positive  Musculoskeletal:  edema lower extremities.   Skin: No skin rashes, no ecchymosis    Diagnostics             Chief complaint  Patient is a 62y old  Male who presents with a chief complaint of fevers (27 Oct 2022 10:47)   Review of systems  Patient in bed, looks comfortable, no hypoglycemic episodes.    Labs and Fingersticks  CAPILLARY BLOOD GLUCOSE      POCT Blood Glucose.: 282 mg/dL (27 Oct 2022 12:18)  POCT Blood Glucose.: 271 mg/dL (27 Oct 2022 09:00)  POCT Blood Glucose.: 293 mg/dL (26 Oct 2022 22:43)  POCT Blood Glucose.: 214 mg/dL (26 Oct 2022 20:32)  POCT Blood Glucose.: 281 mg/dL (26 Oct 2022 16:09)      Anion Gap, Serum: 17 (10-27 @ 07:14)  Anion Gap, Serum: 17 (10-26 @ 07:15)      Calcium, Total Serum: 8.2 *L* (10-27 @ 07:14)  Calcium, Total Serum: 8.2 *L* (10-26 @ 07:15)          10-27    135  |  96  |  70<H>  ----------------------------<  227<H>  4.2   |  22  |  4.91<H>    Ca    8.2<L>      27 Oct 2022 07:14                          10.4   10.07 )-----------( 340      ( 27 Oct 2022 07:10 )             33.0     Medications  MEDICATIONS  (STANDING):  allopurinol 100 milliGRAM(s) Oral daily  aspirin enteric coated 81 milliGRAM(s) Oral daily  atorvastatin 80 milliGRAM(s) Oral at bedtime  carvedilol 25 milliGRAM(s) Oral every 12 hours  colchicine 0.3 milliGRAM(s) Oral every 48 hours  dextrose 5%. 1000 milliLiter(s) (50 mL/Hr) IV Continuous <Continuous>  dextrose 5%. 1000 milliLiter(s) (100 mL/Hr) IV Continuous <Continuous>  dextrose 50% Injectable 25 Gram(s) IV Push once  dextrose 50% Injectable 12.5 Gram(s) IV Push once  dextrose 50% Injectable 25 Gram(s) IV Push once  dextrose Oral Gel 15 Gram(s) Oral once  gabapentin 300 milliGRAM(s) Oral two times a day  glucagon  Injectable 1 milliGRAM(s) IntraMuscular once  heparin   Injectable 5000 Unit(s) SubCutaneous every 12 hours  insulin glargine Injectable (LANTUS) 33 Unit(s) SubCutaneous at bedtime  insulin lispro (ADMELOG) corrective regimen sliding scale   SubCutaneous three times a day before meals  insulin lispro (ADMELOG) corrective regimen sliding scale   SubCutaneous at bedtime  insulin lispro Injectable (ADMELOG) 13 Unit(s) SubCutaneous three times a day before meals  lidocaine/prilocaine Cream 1 Application(s) Topical <User Schedule>  sodium chloride 0.9%. 1000 milliLiter(s) (100 mL/Hr) IV Continuous <Continuous>  tamsulosin 0.4 milliGRAM(s) Oral at bedtime      Physical Exam  General: Patient comfortable in bed  Vital Signs Last 12 Hrs  T(F): 98 (10-27-22 @ 09:24), Max: 98 (10-27-22 @ 09:24)  HR: 75 (10-27-22 @ 09:24) (70 - 75)  BP: 155/77 (10-27-22 @ 09:24) (147/82 - 155/77)  BP(mean): --  RR: 18 (10-27-22 @ 09:24) (18 - 18)  SpO2: 95% (10-27-22 @ 09:24) (95% - 98%)  Neck: No palpable thyroid nodules.  CVS: S1S2, No murmurs  Respiratory: No wheezing, no crepitations  GI: Abdomen soft, bowel sounds positive  Musculoskeletal:  edema lower extremities.   Skin: No skin rashes, no ecchymosis    Diagnostics

## 2022-10-27 NOTE — PROGRESS NOTE ADULT - SUBJECTIVE AND OBJECTIVE BOX
CC: f/u for fever    Patient reports he is still confused    REVIEW OF SYSTEMS:  All other review of systems negative (Comprehensive ROS)    Antimicrobials Day #  :    Other Medications Reviewed    T(F): 97.3 (10-27-22 @ 16:26), Max: 98 (10-26-22 @ 20:20)  HR: 73 (10-27-22 @ 16:26)  BP: 170/65 (10-27-22 @ 16:26)  RR: 18 (10-27-22 @ 16:26)  SpO2: 96% (10-27-22 @ 16:26)  Wt(kg): --    PHYSICAL EXAM:  General: alert, no acute distress  Eyes:  anicteric, no conjunctival injection, no discharge  Oropharynx: no lesions or injection 	  Neck: supple, without adenopathy  Lungs: clear to auscultation  Heart: regular rate and rhythm; no murmur, rubs or gallops  Abdomen: soft, nondistended, nontender, without mass or organomegaly  Skin: no lesions  Extremities: no clubbing, cyanosis, or edema  Neurologic: alert, more appropriate and awake  moves all extremities    LAB RESULTS:                        10.4   10.07 )-----------( 340      ( 27 Oct 2022 07:10 )             33.0     10-27    135  |  96  |  70<H>  ----------------------------<  227<H>  4.2   |  22  |  4.91<H>    Ca    8.2<L>      27 Oct 2022 07:14          MICROBIOLOGY:  RECENT CULTURES:      RADIOLOGY REVIEWED:    < from: MR Cervical Spine No Cont (10.25.22 @ 22:22) >  ACC: [id number] EXAM:  MR SPINE CERVICAL                        ACC: [id number] EXAM:  MR BRAIN                          PROCEDURE DATE:  10/25/2022          INTERPRETATION:  INTERPRETATION:  MRI BRAIN    CLINICAL INDICATION: Altered mental status. Weakness.    MRI OF THE BRAIN WITHOUT CONTRAST:    DATE:  5/24/2022      TECHNIQUE:  Axial SPGR, axial FSE T2-weighted, axial FLAIR, sagittal T1 FLAIR, axial   GRE, and axial diffusion-weighted images of the brain were obtained   without the use of contrast.    COMPARISON: 7/28/2018    FINDINGS: Prominence of the ventricles and subarachnoid spaces,   compatible with atrophy. Periventricular small vessel white matter   ischemic changes are appreciated. Left middle cranial fossa arachnoid   cyst measuring 2.9 cm x 2.0 cm, unchanged in appearance compared with   prior.    No acute intracranial hemorrhage, intraparenchymal mass lesion or midline   shift. Old left basal ganglia infarct.    Magnetic susceptibility artifact noted in the region of the basal ganglia   bilaterally, likely related to deposition of calcium in this location.    Sella turcica is unremarkable in appearance.    Bilateral optic globes are intact. Mucosal thickening along the inferior   aspect of bilateral maxillary sinuses. Small left maxillary sinus polyp   versus retention cysts. Mild mucosal thickening along the anterior aspect   of the bilateral ethmoid air cells. Very minimal inflammatory changes   within left-sided mastoid air cells.      IMPRESSION: Please see below      MRI OF THE CERVICAL SPINE WITHOUT CONTRAST    INDICATIONS: Sensory intact to light touch. Absent proprioception in   toes. Distal atrophy. Bilateral dysmetria.      TECHNIQUE:    Sagittal T1, sagittal T2, sagittal inversion recovery, axial 3D FIESTA,   and axial T1 images are obtained of the cervical spine without the use of   contrast.    COMPARISON:  None available at this time.      FINDINGS:    Straightening of the normal lordotic curvature.    Prevertebral soft tissue is normal in thickness. No abnormal signal   intensity.    No evidence of anterior or posterior ligamentous injury.    There is no abnormal T2 prolongation within the intramedullary intradural   cervical spinal cord. No spinal canal mass is seen.    C2-C3: No significant spinal canal stenosis or neural foraminal narrowing.    C3-C4: Minimal posterior osteophyte formation mildly impresses on the   ventral thecal sac. No spinal cord compression. Mild to moderate left   neural foraminal narrowing.    C4-C5: No significant spinal canal stenosis. Mild to moderate left neural   foraminal narrowing.    C5-C6: Mild broad-based bulging of disc material mildly impresses on the   ventral thecal sac, but not the spinal cord. Mild to moderate left neural   foraminal narrowing.    C6-C7: There is a small disc osteophyte complex which minimally impresses   on the ventral thecal sac, but not the spinal cord. Mild right and mild   to moderate left neural foraminal narrowing.    C7-T1: No significant spinal canal stenosis or neural foraminal narrowing.    More prominent bulging of disc material is appreciated at T2-T3, with   questionable encroachment on the spinal cord. Please note that axial   imaging was not obtained through this level and further dedicated imaging   of the thoracic spine may be considered, as clinically indicated.      IMPRESSION:    Degenerative changes. No significant canal or neural foraminal stenosis.      MRI OF THE BRAIN:    1. Overall, findings are stable in appearance compared with earlier   examination dated 7/28/2018.  2. Atrophy, old left basal ganglial lacunar infarct and left middle   cranial fossa arachnoid cyst.  3. No restricted diffusion. No acute ischemic event.    MRI OF THE CERVICAL SPINE:  1. No acute fracture or AP plane subluxation.  2. No abnormal intramedullary T2 increased signal within imaged portions   of the cervical spinal cord.  3. Mild degenerative changes, as described in detail above.  4. More prominent bulging of disc material is appreciated at T2-T3, with   questionable encroachment on the spinal cord. Please note that axial   imaging was not obtained through this level and further dedicated imaging   of the thoracic spine may be considered, as clinically indicated.    --- End of Report ---      DAWN BEHR-VENTURA MD; Attending Radiologist  This document has been electronically signed. Oct 26 2022  2:38PM    < end of copied text >            Assessment:  Patient with esrd, dm, cardiomyopathy on mems , recent stay at [facility] for hyperosmotic nonketotic state, developed some low grade temps at end of stay, came here a few days ago with fever, severe gout attack with pain all over much better on steroids, Found to be very encephalopathic now s/p dialysis and mental status is much improved. Suspect fever was from gout and encephalopathy was from uremia. He is now off antibiotics and remains afebrile. CNS imaging not revealing so far  Plan:  monitor off antibiotics  neuro follows   dialysis per renal

## 2022-10-27 NOTE — PROGRESS NOTE ADULT - SUBJECTIVE AND OBJECTIVE BOX
CARDIOLOGY     PROGRESS  NOTE   ________________________________________________    CHIEF COMPLAINT:Patient is a 62y old  Male who presents with a chief complaint of fevers (26 Oct 2022 13:33)  doing better.  	  REVIEW OF SYSTEMS:  CONSTITUTIONAL: No fever, weight loss, or fatigue  EYES: No eye pain, visual disturbances, or discharge  ENT:  No difficulty hearing, tinnitus, vertigo; No sinus or throat pain  NECK: No pain or stiffness  RESPIRATORY: No cough, wheezing, chills or hemoptysis; decrease  Shortness of Breath  CARDIOVASCULAR: No chest pain, palpitations, passing out, dizziness, or leg swelling  GASTROINTESTINAL: No abdominal or epigastric pain. No nausea, vomiting, or hematemesis; No diarrhea or constipation. No melena or hematochezia.  GENITOURINARY: No dysuria, frequency, hematuria, or incontinence  NEUROLOGICAL: No headaches, memory loss, loss of strength, numbness, or tremors  SKIN: No itching, burning, rashes, or lesions   LYMPH Nodes: No enlarged glands  ENDOCRINE: No heat or cold intolerance; No hair loss  MUSCULOSKELETAL: No joint pain or swelling; No muscle, back, or extremity pain  PSYCHIATRIC: No depression, anxiety, mood swings, or difficulty sleeping  HEME/LYMPH: No easy bruising, or bleeding gums  ALLERGY AND IMMUNOLOGIC: No hives or eczema	    [ ] All others negative	  [ ] Unable to obtain    PHYSICAL EXAM:  T(C): 36.5 (10-27-22 @ 05:12), Max: 36.9 (10-26-22 @ 09:34)  HR: 70 (10-27-22 @ 05:12) (70 - 81)  BP: 147/82 (10-27-22 @ 05:12) (142/76 - 158/78)  RR: 18 (10-27-22 @ 05:12) (16 - 18)  SpO2: 98% (10-27-22 @ 05:12) (93% - 98%)  Wt(kg): --  I&O's Summary    26 Oct 2022 07:01  -  27 Oct 2022 07:00  --------------------------------------------------------  IN: 300 mL / OUT: 700 mL / NET: -400 mL        Appearance: Normal	  HEENT:   Normal oral mucosa, PERRL, EOMI	  Lymphatic: No lymphadenopathy  Cardiovascular: Normal S1 S2, No JVD, + murmurs, + edema  Respiratory: rhonchi  Psychiatry: A & O x 3, Mood & affect appropriate  Gastrointestinal:  Soft, Non-tender, + BS	  Skin: No rashes, No ecchymoses, No cyanosis	  Extremities: Normal range of motion, No clubbing, cyanosis , + edema  Vascular: Peripheral pulses palpable 2+ bilaterally    MEDICATIONS  (STANDING):  allopurinol 100 milliGRAM(s) Oral daily  aspirin enteric coated 81 milliGRAM(s) Oral daily  atorvastatin 80 milliGRAM(s) Oral at bedtime  carvedilol 25 milliGRAM(s) Oral every 12 hours  colchicine 0.3 milliGRAM(s) Oral every 48 hours  dextrose 5%. 1000 milliLiter(s) (50 mL/Hr) IV Continuous <Continuous>  dextrose 5%. 1000 milliLiter(s) (100 mL/Hr) IV Continuous <Continuous>  dextrose 50% Injectable 25 Gram(s) IV Push once  dextrose 50% Injectable 12.5 Gram(s) IV Push once  dextrose 50% Injectable 25 Gram(s) IV Push once  dextrose Oral Gel 15 Gram(s) Oral once  gabapentin 300 milliGRAM(s) Oral two times a day  glucagon  Injectable 1 milliGRAM(s) IntraMuscular once  heparin   Injectable 5000 Unit(s) SubCutaneous every 12 hours  insulin glargine Injectable (LANTUS) 30 Unit(s) SubCutaneous at bedtime  insulin lispro (ADMELOG) corrective regimen sliding scale   SubCutaneous three times a day before meals  insulin lispro (ADMELOG) corrective regimen sliding scale   SubCutaneous at bedtime  insulin lispro Injectable (ADMELOG) 12 Unit(s) SubCutaneous three times a day before meals  lidocaine/prilocaine Cream 1 Application(s) Topical <User Schedule>  sodium chloride 0.9%. 1000 milliLiter(s) (100 mL/Hr) IV Continuous <Continuous>  tamsulosin 0.4 milliGRAM(s) Oral at bedtime      TELEMETRY: 	    ECG:  	  RADIOLOGY:  OTHER: 	  	  LABS:	 	    CARDIAC MARKERS:                                10.0   8.56  )-----------( 316      ( 26 Oct 2022 07:20 )             31.6     10-26    135  |  95<L>  |  95<H>  ----------------------------<  239<H>  4.2   |  23  |  6.16<H>    Ca    8.2<L>      26 Oct 2022 07:15      proBNP:   Lipid Profile:   HgA1c:   TSH:     (1)CKD - stage 5 - diabetic nephropathy  (2)LEYDA - prerenally mediated  (3)Fatigue/weakness - likely at least in part from uremia.      < from: MR Head No Cont (10.25.22 @ 22:11) >  1. Overall, findings are stable in appearance compared with earlier   examination dated 7/28/2018.  2. Atrophy, old left basal ganglial lacunar infarct and left middle   cranial fossa arachnoid cyst.  3. No restricted diffusion. No acute ischemic event.    MRI OF THE CERVICAL SPINE:  1. No acute fracture or AP plane subluxation.  2. No abnormal intramedullary T2 increased signal within imaged portions   of the cervical spinal cord.  3. Mild degenerative changes, as described in detail above.  4. More prominent bulging of disc material is appreciated at T2-T3, with   questionable encroachment on the spinal cord. Please note that axial   imaging was not obtained through this level and further dedicated imaging   of the thoracic spine may be considered, as clinically indicated.        Assessment and plan  ---------------------------  62 yom hx of CKD, DKA, DMtype2, cardiomyopathy w/ stents, HTN, gout p/w days of generalized weakness and AMS. Per wife, pt was just in [facility] for DKA. Was d/c'd w/ fever and still weak. Since tuesday, pt becoming more lethargic and altered especially when he waking up in morning. BG have been in the 200s since per cont BG monitor. Subjective fevers at home, wife feels that his abd is larger than it was before d/c from [facility]. Question of if pt has heather compliant w/ short and long acting insulin as wife works nights and is not there to give all meds at home. Unclear if pt has been falling at home but w/ bruising on his abd.   pt with hx of ashd, chf, cardiomyopathy, dm, s/p CARDIOMEM fu at [facility] with change of mental stratus and sob.  ct scan noted , no active chf  will adjust cardiac meds  will call chf team at [facility] to get Cardiomem readings  beta blocker/ hydralazine and Nitrate  ckd, renal follow up  check pro bnp  dvt prophylaxis  pt with known hx of cad, last stress test  in 07/2021, no ischemia with scar in mary septal and apical area  ct chest abdomen and pelvis noted, no pleural effusion or sign of chf  renal increased renal function last admission CR 4.8  continue asa daily /coreg will add ace as pt on HD  avoid excess fluid, may dc ivf, hx of chf , EF40%  s/p HD yesterday  dc colchicine  MRI noted may consider thoracic MRI

## 2022-10-27 NOTE — PROGRESS NOTE ADULT - ASSESSMENT
(1)CKD - stage 5 - diabetic nephropathy  (2)LEYDA - prerenally mediated  (3)Fatigue/weakness - likely at least in part from uremia.        RECOMMEND:  (1) HD #3 today   (2)Off IVF    (3)Dose new meds for GFR <10     DW Wife   Care coordination was contacted re outpt referral for HD       Sayed [facility]   [phone]

## 2022-10-27 NOTE — PROGRESS NOTE ADULT - ASSESSMENT
2 yom   hx of CKD,   s/p DKA, DM.     cardiomyopathy,  CAD w/ stents, HTN, gout     generalized  weakness.  with   c/c h/o b/l leg weakness/ and   b/l arm weakness, r > l      p/w days of generalized weakness and AMS    .Per wife, pt was just in [facility] for DKA.     d/c'd w/ fever and still weak. Since tuesday, pt becoming more lethargic and altered especially when he waking up in morning   glucose,  have been in the 200s since per cont BG monitor. Subjective fevers at home,    Question of if pt has heather compliant w/ short and long acting insulin as wife works nights and is not there to give all meds at home.    Unclear if pt has been falling at home but w/ bruising on his abdomen        *   pt  admitted  with fevers/ ams. / metabolic  encephalopathy   on  arrival, wbc  was12,000         bcx/ ucx,  wa s negative       wa s on   Zosyn        Ct  a/p,   ? cystitis/   ID eval dr harrington    * CKD,  crt is 5,4/ ,  renal  dr dickerson      c/c  anemia     *   DM,  follow  fs     on lantus/  known to  [facility]     *  ef 40     *  c/c  weakness  of   limbs/  neuro d rajeev mccain    has  funcytional  quadriplegia/  CT  head, . 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  old  infarcts    much improved, more  alert/  spoke  with wife/     wife  wants  rehab/,  unable  to take care of  pt    start   d/ c planning             rad< from: CT Abdomen and Pelvis No Cont (10.22.22 @ 15:40) >  IMPRESSION:  Mild bladder wall thickening. Correlate with urinalysis  --- End of Report ---  < end of copied text >      rad< from: Transthoracic Echocardiogram (12.07.21 @ 11:24) >  nclusions:  1. Normal mitral valve. Minimal mitral regurgitation.  2. Calcified trileaflet aortic valve with normal opening.  Minimal aortic regurgitation.  3. Mild segmental left ventricular systolic dysfunction.  The basal inferior, basal septum, distal septum, distal  inferior, and the apex are akinetic. Recommend limited  repeat imaging with intravenous echo contrast to better  visualize the apex. (Per sonographer, patient did not agree  to echo contrast during the study.)  4. Mild diastolic dysfunction (Stage I).  5. Normal right ventricular size and function.  *** No previous Echo exam.    < end of copied text >

## 2022-10-28 ENCOUNTER — TRANSCRIPTION ENCOUNTER (OUTPATIENT)
Age: 62
End: 2022-10-28

## 2022-10-28 LAB
GLUCOSE BLDC GLUCOMTR-MCNC: 104 MG/DL — HIGH (ref 70–99)
GLUCOSE BLDC GLUCOMTR-MCNC: 128 MG/DL — HIGH (ref 70–99)
GLUCOSE BLDC GLUCOMTR-MCNC: 133 MG/DL — HIGH (ref 70–99)
GLUCOSE BLDC GLUCOMTR-MCNC: 141 MG/DL — HIGH (ref 70–99)
GLUCOSE BLDC GLUCOMTR-MCNC: 167 MG/DL — HIGH (ref 70–99)
GLUCOSE BLDC GLUCOMTR-MCNC: 84 MG/DL — SIGNIFICANT CHANGE UP (ref 70–99)

## 2022-10-28 PROCEDURE — 93306 TTE W/DOPPLER COMPLETE: CPT | Mod: 26

## 2022-10-28 RX ORDER — ERYTHROPOIETIN 10000 [IU]/ML
4000 INJECTION, SOLUTION INTRAVENOUS; SUBCUTANEOUS ONCE
Refills: 0 | Status: COMPLETED | OUTPATIENT
Start: 2022-10-29 | End: 2022-10-29

## 2022-10-28 RX ORDER — INSULIN GLARGINE 100 [IU]/ML
28 INJECTION, SOLUTION SUBCUTANEOUS AT BEDTIME
Refills: 0 | Status: DISCONTINUED | OUTPATIENT
Start: 2022-10-28 | End: 2022-10-29

## 2022-10-28 RX ORDER — INSULIN LISPRO 100/ML
10 VIAL (ML) SUBCUTANEOUS
Refills: 0 | Status: DISCONTINUED | OUTPATIENT
Start: 2022-10-28 | End: 2022-10-29

## 2022-10-28 RX ORDER — VALSARTAN 80 MG/1
40 TABLET ORAL DAILY
Refills: 0 | Status: DISCONTINUED | OUTPATIENT
Start: 2022-10-28 | End: 2022-10-29

## 2022-10-28 RX ADMIN — HEPARIN SODIUM 5000 UNIT(S): 5000 INJECTION INTRAVENOUS; SUBCUTANEOUS at 17:35

## 2022-10-28 RX ADMIN — TAMSULOSIN HYDROCHLORIDE 0.4 MILLIGRAM(S): 0.4 CAPSULE ORAL at 21:38

## 2022-10-28 RX ADMIN — OXYCODONE AND ACETAMINOPHEN 1 TABLET(S): 5; 325 TABLET ORAL at 21:37

## 2022-10-28 RX ADMIN — Medication 1: at 09:12

## 2022-10-28 RX ADMIN — GABAPENTIN 300 MILLIGRAM(S): 400 CAPSULE ORAL at 05:00

## 2022-10-28 RX ADMIN — Medication 100 MILLIGRAM(S): at 12:54

## 2022-10-28 RX ADMIN — CARVEDILOL PHOSPHATE 25 MILLIGRAM(S): 80 CAPSULE, EXTENDED RELEASE ORAL at 05:00

## 2022-10-28 RX ADMIN — Medication 10 UNIT(S): at 12:54

## 2022-10-28 RX ADMIN — Medication 13 UNIT(S): at 09:13

## 2022-10-28 RX ADMIN — GABAPENTIN 300 MILLIGRAM(S): 400 CAPSULE ORAL at 17:35

## 2022-10-28 RX ADMIN — ATORVASTATIN CALCIUM 80 MILLIGRAM(S): 80 TABLET, FILM COATED ORAL at 21:37

## 2022-10-28 RX ADMIN — INSULIN GLARGINE 28 UNIT(S): 100 INJECTION, SOLUTION SUBCUTANEOUS at 23:12

## 2022-10-28 RX ADMIN — Medication 10 UNIT(S): at 17:34

## 2022-10-28 RX ADMIN — CARVEDILOL PHOSPHATE 25 MILLIGRAM(S): 80 CAPSULE, EXTENDED RELEASE ORAL at 17:35

## 2022-10-28 RX ADMIN — VALSARTAN 40 MILLIGRAM(S): 80 TABLET ORAL at 09:56

## 2022-10-28 RX ADMIN — Medication 81 MILLIGRAM(S): at 12:54

## 2022-10-28 RX ADMIN — OXYCODONE AND ACETAMINOPHEN 1 TABLET(S): 5; 325 TABLET ORAL at 22:37

## 2022-10-28 RX ADMIN — HEPARIN SODIUM 5000 UNIT(S): 5000 INJECTION INTRAVENOUS; SUBCUTANEOUS at 05:00

## 2022-10-28 NOTE — DISCHARGE NOTE PROVIDER - NSDCFUSCHEDAPPT_GEN_ALL_CORE_FT
Marcia Jimenez  [facility]  [address]ALM [address] [facility]  Scheduled Appointment: 12/21/2022

## 2022-10-28 NOTE — DISCHARGE NOTE PROVIDER - HOSPITAL COURSE
62 yom   hx of CKD,   s/p DKA, DM.     cardiomyopathy,  CAD w/ stents, HTN, gout     generalized  weakness.  with   c/c h/o b/l leg weakness/ and   b/l arm weakness, r > l      p/w days of generalized weakness and AMS    .Per wife, pt was just in [facility] for DKA.     d/c'd w/ fever and still weak. Since tuesday, pt becoming more lethargic and altered especially when he waking up in morning   glucose,  have been in the 200s since per cont BG monitor. Subjective fevers at home,    Question of if pt has heather compliant w/ short and long acting insulin as wife works nights and is not there to give all meds at home.    Unclear if pt has been falling at home but w/ bruising on his abdomen        *   pt  admitted  with fevers/ ams. / metabolic  encephalopathy   on  arrival, wbc  was12,000         bcx/ ucx,  wa s negative       wa s on   Zosyn        Ct  a/p,   ? cystitis/   ID eval dr harrington    * CKD,  crt is 5,4/ ,  renal  dr dickerson      c/c  anemia     *   DM,  follow  fs     on lantus/  known to  nika talley     *  ef 40     *  c/c  weakness  of   limbs/  neuro d rajeev mccain    has  funcytional  quadriplegia/  CT  head, . 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  old  infarcts    much improved, more  alert/  spoke  with wife/     wife  wants  rehab/,  unable  to take care of  pt       d/ c planning/  and   d/c  order  ha s been  written   62 yom   hx of CKD,   s/p DKA, DM.     cardiomyopathy,  CAD w/ stents, HTN, gout     generalized  weakness.  with   c/c h/o b/l leg weakness/ and   b/l arm weakness, r > l      p/w days of generalized weakness and AMS    .Per wife, pt was just in [facility] for DKA.     d/c'd w/ fever and still weak. Since tuesday, pt becoming more lethargic and altered especially when he waking up in morning   glucose,  have been in the 200s since per cont BG monitor. Subjective fevers at home,    Question of if pt has heather compliant w/ short and long acting insulin as wife works nights and is not there to give all meds at home.    Unclear if pt has been falling at home but w/ bruising on his abdomen        *   pt  admitted  with fevers/ ams. / metabolic  encephalopathy  .  from [facility] , on  arrival, wbc  was12,000         bcx/ ucx,  wa s negative.   no  sepsis  per  IDd r brieff       wa s on    empiric Zosyn        Ct  a/p,   ? cystitis/   ID eval dr harrington    * CKD,  crt is 5,4/ ,  renal  dr dickerson      c/c  anemia     *   DM,  follow  fs     on lantus/  known to  nika talley     *  ef 40     *  c/c  weakness  of   limbs/  neuro ga mccain    has  funcytional  quadriplegia/  CT  head, . 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  old  infarcts    much improved, more  alert/  spoke  with wife/     wife  wants  rehab/,  unable  to take care of  pt       d/ c planning/  and   d/c  order  ha s been  written

## 2022-10-28 NOTE — DIETITIAN INITIAL EVALUATION ADULT - TIME FRAME
Please follow up every 6 months for hypertension  Valtrex is a medication for cold sores if you are ever interested 
1 month

## 2022-10-28 NOTE — PROGRESS NOTE ADULT - ASSESSMENT
Assessment  DMT2: 62y Male with DM T2 with hyperglycemia admitted with weakness, was on insulin at home, now on basal bolus insulin, increased dose yesterday, blood sugars are improving, no hypoglycemic episodes, eating meals, appears comfortable, prednisone DC.  CAD: on medications, monitored, asymptomatic.  HTN: On antihypertensive medications, monitored, asymptomatic.  CKD:  labs, chart reviewed.                  Rubén Escoto MD  Cell:  [phone]  Office: [phone]    Rachel SHORT  Cell:   Office: [phone]               Assessment  DMT2: 62y Male with DM T2 with hyperglycemia admitted with weakness, was on insulin at home, now on basal bolus insulin, increased dose yesterday, blood sugars are improving, no hypoglycemic episodes,  eating meals, appears comfortable, prednisone DC.  CAD: on medications, monitored, asymptomatic.  HTN: On antihypertensive medications, monitored, asymptomatic.  CKD:  labs, chart reviewed.                  Rubén Escoto MD  Cell:  [phone]  Office: [phone]    Rachel SHORT  Cell:   Office: [phone]

## 2022-10-28 NOTE — DIETITIAN INITIAL EVALUATION ADULT - REASON INDICATOR FOR ASSESSMENT
length of stay.  H&P "61 yo M, PMH of CKD,  s/p DKA, DM, cardiomyopathy,  CAD w/ stents, HTN, gout,  p/w days of generalized weakness and AMS   Per wife, pt was just in [facility] for DKA."

## 2022-10-28 NOTE — PROGRESS NOTE ADULT - SUBJECTIVE AND OBJECTIVE BOX
CARDIOLOGY     PROGRESS  NOTE   ________________________________________________    CHIEF COMPLAINT:Patient is a 62y old  Male who presents with a chief complaint of fevers (27 Oct 2022 17:47)  more alert, no complain  	  REVIEW OF SYSTEMS:  CONSTITUTIONAL: No fever, weight loss, or fatigue  EYES: No eye pain, visual disturbances, or discharge  ENT:  No difficulty hearing, tinnitus, vertigo; No sinus or throat pain  NECK: No pain or stiffness  RESPIRATORY: No cough, wheezing, chills or hemoptysis; +Shortness of Breath  CARDIOVASCULAR: No chest pain, palpitations, passing out, dizziness, or leg swelling  GASTROINTESTINAL: No abdominal or epigastric pain. No nausea, vomiting, or hematemesis; No diarrhea or constipation. No melena or hematochezia.  GENITOURINARY: No dysuria, frequency, hematuria, or incontinence  NEUROLOGICAL: No headaches, memory loss, loss of strength, numbness, or tremors  SKIN: No itching, burning, rashes, or lesions   LYMPH Nodes: No enlarged glands  ENDOCRINE: No heat or cold intolerance; No hair loss  MUSCULOSKELETAL: No joint pain or swelling; No muscle, back, or extremity pain  PSYCHIATRIC: No depression, anxiety, mood swings, or difficulty sleeping  HEME/LYMPH: No easy bruising, or bleeding gums  ALLERGY AND IMMUNOLOGIC: No hives or eczema	    [ ] All others negative	  [ ] Unable to obtain    PHYSICAL EXAM:  T(C): 36.6 (10-28-22 @ 04:13), Max: 37.3 (10-28-22 @ 00:56)  HR: 70 (10-28-22 @ 04:13) (69 - 80)  BP: 161/81 (10-28-22 @ 04:13) (122/67 - 170/65)  RR: 18 (10-28-22 @ 04:13) (16 - 18)  SpO2: 99% (10-28-22 @ 04:13) (95% - 99%)  Wt(kg): --  I&O's Summary    27 Oct 2022 07:01  -  28 Oct 2022 07:00  --------------------------------------------------------  IN: 480 mL / OUT: 500 mL / NET: -20 mL        Appearance: Normal	  HEENT:   Normal oral mucosa, PERRL, EOMI	  Lymphatic: No lymphadenopathy  Cardiovascular: Normal S1 S2, No JVD, + murmurs, No edema  Respiratory:rhonchi  Psychiatry: A & O x 3, Mood & affect appropriate  Gastrointestinal:  Soft, Non-tender, + BS	  Skin: No rashes, No ecchymoses, No cyanosis	  Extremities: Normal range of motion, No clubbing, cyanosis or edema  Vascular: Peripheral pulses palpable 2+ bilaterally    MEDICATIONS  (STANDING):  allopurinol 100 milliGRAM(s) Oral daily  aspirin enteric coated 81 milliGRAM(s) Oral daily  atorvastatin 80 milliGRAM(s) Oral at bedtime  carvedilol 25 milliGRAM(s) Oral every 12 hours  colchicine 0.3 milliGRAM(s) Oral every 48 hours  dextrose 5%. 1000 milliLiter(s) (50 mL/Hr) IV Continuous <Continuous>  dextrose 5%. 1000 milliLiter(s) (100 mL/Hr) IV Continuous <Continuous>  dextrose 50% Injectable 25 Gram(s) IV Push once  dextrose 50% Injectable 12.5 Gram(s) IV Push once  dextrose 50% Injectable 25 Gram(s) IV Push once  dextrose Oral Gel 15 Gram(s) Oral once  gabapentin 300 milliGRAM(s) Oral two times a day  glucagon  Injectable 1 milliGRAM(s) IntraMuscular once  heparin   Injectable 5000 Unit(s) SubCutaneous every 12 hours  insulin glargine Injectable (LANTUS) 33 Unit(s) SubCutaneous at bedtime  insulin lispro (ADMELOG) corrective regimen sliding scale   SubCutaneous three times a day before meals  insulin lispro (ADMELOG) corrective regimen sliding scale   SubCutaneous at bedtime  insulin lispro Injectable (ADMELOG) 13 Unit(s) SubCutaneous three times a day before meals  lidocaine/prilocaine Cream 1 Application(s) Topical <User Schedule>  sodium chloride 0.9%. 1000 milliLiter(s) (100 mL/Hr) IV Continuous <Continuous>  tamsulosin 0.4 milliGRAM(s) Oral at bedtime      TELEMETRY: 	    ECG:  	  RADIOLOGY:  OTHER: 	  	  LABS:	 	    CARDIAC MARKERS:                                10.4   10.07 )-----------( 340      ( 27 Oct 2022 07:10 )             33.0     10-27    135  |  96  |  70<H>  ----------------------------<  227<H>  4.2   |  22  |  4.91<H>    Ca    8.2<L>      27 Oct 2022 07:14      proBNP:   Lipid Profile:   HgA1c:   TSH:     (1) HD #3 today   (2)Off IVF    (3)Dose new meds for GFR <10       Assessment and plan  ---------------------------  62 yom hx of CKD, DKA, DMtype2, cardiomyopathy w/ stents, HTN, gout p/w days of generalized weakness and AMS. Per wife, pt was just in [facility] for DKA. Was d/c'd w/ fever and still weak. Since tuesday, pt becoming more lethargic and altered especially when he waking up in morning. BG have been in the 200s since per cont BG monitor. Subjective fevers at home, wife feels that his abd is larger than it was before d/c from [facility]. Question of if pt has heather compliant w/ short and long acting insulin as wife works nights and is not there to give all meds at home. Unclear if pt has been falling at home but w/ bruising on his abd.   pt with hx of ashd, chf, cardiomyopathy, dm, s/p CARDIOMEM fu at [facility] with change of mental stratus and sob.  ct scan noted , no active chf  will adjust cardiac meds  will call chf team at [facility] to get Cardiomem readings  beta blocker/ hydralazine and Nitrate  ckd, renal follow up  check pro bnp  dvt prophylaxis  pt with known hx of cad, last stress test  in 07/2021, no ischemia with scar in mary septal and apical area  ct chest abdomen and pelvis noted, no pleural effusion or sign of chf  renal increased renal function last admission CR 4.8  continue asa daily /coreg will add ace as pt on HD  avoid excess fluid, may dc ivf, hx of chf , EF40%  s/p HD yesterday  dc colchicine  MRI noted may consider thoracic MRI  start on ARB while on HD, repeat echo

## 2022-10-28 NOTE — DISCHARGE NOTE PROVIDER - NSDCMRMEDTOKEN_GEN_ALL_CORE_FT
allopurinol 300 mg oral tablet: 1 tab(s) orally once a day  aspirin 81 mg oral delayed release tablet: 1 tab(s) orally once a day  atorvastatin 80 mg oral tablet: 1 tab(s) orally once a day (at bedtime)  Basaglar KwikPen 100 units/mL subcutaneous solution: 40 unit(s) subcutaneous once a day (at bedtime)   Brilinta (ticagrelor) 90 mg oral tablet: 1 tab(s) orally 2 times a day  Coreg 25 mg oral tablet: 1 tab(s) orally 2 times a day  DULoxetine 30 mg oral delayed release capsule: 1 cap(s) orally once a day  famotidine 20 mg oral tablet: 1 tab(s) orally once a day  gabapentin 300 mg oral capsule: 1 cap(s) orally 2 times a day  HumaLOG 100 units/mL subcutaneous solution: 8 unit(s) subcutaneous 3 times a day (before meals)  tamsulosin 0.4 mg oral capsule: 1 cap(s) orally once a day (at bedtime)   allopurinol 300 mg oral tablet: 1 tab(s) orally once a day  atorvastatin 80 mg oral tablet: 1 tab(s) orally once a day (at bedtime)  Basaglar KwikPen 100 units/mL subcutaneous solution: 40 unit(s) subcutaneous once a day (at bedtime)   Brilinta (ticagrelor) 90 mg oral tablet: 1 tab(s) orally 2 times a day  Coreg 25 mg oral tablet: 1 tab(s) orally 2 times a day  DULoxetine 30 mg oral delayed release capsule: 1 cap(s) orally once a day  famotidine 20 mg oral tablet: 1 tab(s) orally once a day  gabapentin 300 mg oral capsule: 1 cap(s) orally 2 times a day  HumaLOG 100 units/mL subcutaneous solution: 8 unit(s) subcutaneous 3 times a day (before meals)  lidocaine-prilocaine 2.5%-2.5% topical cream: Apply topically to affected area 3 times a day  oxycodone-acetaminophen 5 mg-325 mg oral tablet: 1 tab(s) orally every 12 hours, As needed, Moderate Pain (4 - 6)  tamsulosin 0.4 mg oral capsule: 1 cap(s) orally once a day (at bedtime)  valsartan 40 mg oral tablet: 1 tab(s) orally 2 times a day   acetaminophen 325 mg oral tablet: 2 tab(s) orally every 6 hours, As needed, Mild Pain (1 - 3), Moderate Pain (4 - 6)  allopurinol 300 mg oral tablet: 1 tab(s) orally once a day  amLODIPine 5 mg oral tablet: 1 tab(s) orally once a day  atorvastatin 80 mg oral tablet: 1 tab(s) orally once a day (at bedtime)  benzonatate 100 mg oral capsule: 1 cap(s) orally 3 times a day  clopidogrel 75 mg oral tablet: 1 tab(s) orally once a day  colchicine 0.6 mg oral tablet: 0.5 tab(s) orally every 48 hours  Coreg 25 mg oral tablet: 1 tab(s) orally 2 times a day  DULoxetine 30 mg oral delayed release capsule: 1 cap(s) orally once a day  famotidine 20 mg oral tablet: 1 tab(s) orally once a day  gabapentin 300 mg oral capsule: 1 cap(s) orally 2 times a day  guaiFENesin 100 mg/5 mL oral liquid: 5 milliliter(s) orally every 6 hours, As needed, Cough  insulin glargine 100 units/mL subcutaneous solution: 12 unit(s) subcutaneous once a day (at bedtime)  insulin lispro 100 units/mL injectable solution: 6 unit(s) subcutaneous 3 times a day (before meals)  insulin lispro 100 units/mL injectable solution: Sliding Scale:  1 Unit(s) if Glucose 151 - 200  2 Unit(s) if Glucose 201 - 250  3 Unit(s) if Glucose 251 - 300  4 Unit(s) if Glucose 301 - 350  5 Unit(s) if Glucose 351 - 400  6 Unit(s) if Glucose Greater Than 400  insulin lispro 100 units/mL injectable solution: 1 dose(s) subcutaneous once a day (at bedtime)  0 Unit(s) if Glucose 0 - 250  1 Unit(s) if Glucose 251 - 300  2 Unit(s) if Glucose 301 - 350  3 Unit(s) if Glucose 351 - 400  4 Unit(s) if Glucose Greater Than 400  lidocaine-prilocaine 2.5%-2.5% topical cream: Apply topically to affected area 3 times a day  oxycodone-acetaminophen 5 mg-325 mg oral tablet: 1 tab(s) orally every 12 hours, As needed, Moderate Pain (4 - 6)  tamsulosin 0.4 mg oral capsule: 1 cap(s) orally once a day (at bedtime)  valsartan 320 mg oral tablet: 1 tab(s) orally once a day   acetaminophen 325 mg oral tablet: 2 tab(s) orally every 6 hours, As needed, Mild Pain (1 - 3), Moderate Pain (4 - 6)  allopurinol 300 mg oral tablet: 1 tab(s) orally once a day  amLODIPine 5 mg oral tablet: 1 tab(s) orally once a day  atorvastatin 80 mg oral tablet: 1 tab(s) orally once a day (at bedtime)  benzonatate 100 mg oral capsule: 1 cap(s) orally 3 times a day  clopidogrel 75 mg oral tablet: 1 tab(s) orally once a day  colchicine 0.6 mg oral tablet: 0.5 tab(s) orally every 48 hours  Coreg 25 mg oral tablet: 1 tab(s) orally 2 times a day  DULoxetine 30 mg oral delayed release capsule: 1 cap(s) orally once a day  epoetin wayne: 1 dose(s)  3 times a week  famotidine 20 mg oral tablet: 1 tab(s) orally once a day  gabapentin 300 mg oral capsule: 1 cap(s) orally 2 times a day  guaiFENesin 100 mg/5 mL oral liquid: 5 milliliter(s) orally every 6 hours, As needed, Cough  insulin glargine 100 units/mL subcutaneous solution: 12 unit(s) subcutaneous once a day (at bedtime)  insulin lispro 100 units/mL injectable solution: 6 unit(s) subcutaneous 3 times a day (before meals)  insulin lispro 100 units/mL injectable solution: Sliding Scale:  1 Unit(s) if Glucose 151 - 200  2 Unit(s) if Glucose 201 - 250  3 Unit(s) if Glucose 251 - 300  4 Unit(s) if Glucose 301 - 350  5 Unit(s) if Glucose 351 - 400  6 Unit(s) if Glucose Greater Than 400  insulin lispro 100 units/mL injectable solution: 1 dose(s) subcutaneous once a day (at bedtime)  0 Unit(s) if Glucose 0 - 250  1 Unit(s) if Glucose 251 - 300  2 Unit(s) if Glucose 301 - 350  3 Unit(s) if Glucose 351 - 400  4 Unit(s) if Glucose Greater Than 400  lidocaine-prilocaine 2.5%-2.5% topical cream: Apply topically to affected area 3 times a day  oxycodone-acetaminophen 5 mg-325 mg oral tablet: 1 tab(s) orally every 12 hours, As needed, Moderate Pain (4 - 6)  tamsulosin 0.4 mg oral capsule: 1 cap(s) orally once a day (at bedtime)  valsartan 320 mg oral tablet: 1 tab(s) orally once a day

## 2022-10-28 NOTE — PROGRESS NOTE ADULT - ASSESSMENT
63 yo male with a history of DM, ischemic cardiomyopathy, CAD,HTN, gout, CKD, and DM.  He was recently hospitalized at [facility] for hyperglycemia.  Low grade fever with a toxic metabolic encephalopathy.  His blood and urine cultures have been negative.  Serum crypt antigen negative.  CT scan of C/A/P without a source of infection outside of a thickened bladder(negative urine culture)  Neurology w/u in progress  His fevers have resolved  Meropenem stopped 10/26  He has remained afebrile off antibiotics  ? Metabolic encephalopathy appears resolved  Additional w/u pending course  Prior fevers may have reflected gout,  MRI of C spine and brain without source of infection  Suggest:  1.monitor off antibiotics  2.No signs of infection, no additional ID w/u planned, we will stop actively following, please call if ID issue arise.

## 2022-10-28 NOTE — DISCHARGE NOTE PROVIDER - NSDCCPCAREPLAN_GEN_ALL_CORE_FT
PRINCIPAL DISCHARGE DIAGNOSIS  Diagnosis: Altered mental state  Assessment and Plan of Treatment: metabolic encephalopahty. MRI head negative.   HOME CARE INSTRUCTIONS  What family and friends can do:  To find out if someone is confused ask him or their name, age, and the date. If the person is unsure or answers incorrectly, he or she is confused.  Always introduce yourself, no matter how well the person knows you.  Often remind the person of his or her location.  Place a calendar and clock near the confused person.  Talk about current events and plans for the day.  Try to keep the environment calm, quiet and peaceful.  Make sure the patient keeps follow up appointments with their physician.  PREVENTION  Ways to prevent confusion:  Avoid alcohol.  Eat a balanced diet.  Get enough sleep.  Do not become isolated. Spend time with other people and make plans for your days.  Keep careful watch on your blood sugar levels if you are diabetic.  SEEK IMMEDIATE MEDICAL CARE IF:  You develop severe headaches, repeated vomiting, seizures, blackouts or slurred speech.  There is increasing confusion, weakness, numbness, restlessness or personality changes.  You develop a loss of balance, have marked dizziness, feel uncoordinated or fall.  You have delusions, hallucinations or develop severe anxiety.  Your family members think you need to be rechecked        SECONDARY DISCHARGE DIAGNOSES  Diagnosis: Fever  Assessment and Plan of Treatment: Low grade fever with a toxic metabolic encephalopathy.  His blood and urine cultures have been negative.  Serum crypt antigen negative.  CT scan of C/A/P without a source of infection outside of a thickened bladder(negative urine culture)  Neurology w/u in progress  His fevers have resolved  Meropenem stopped 10/26    Diagnosis: ESRD on dialysis  Assessment and Plan of Treatment: Your kidneys perform several critical functions. They clean your blood and support important bodily functions. When your kidneys cannot perform these functions, hemodialysis may be required. Hemodialysis is a treatment for kidney failure. Normally, the kidneys work to filter the blood and remove waste and excess salt and water (figure 1). Kidney failure, also called "end-stage kidney disease," is when the kidneys stop working completely. With hemodialysis, a machine takes over the job of the kidneys. Blood is pumped from the body, filtered through a dialysis machine, and then returned to the body. People can also have problems during dialysis treatments. These can include:   - Feeling lightheaded   - Trouble breathing   - Belly or muscle cramps   - Nausea or vomiting  Let your doctor or nurse know if you have any problems. Many of them can be treated.  .  - Weigh yourself every day – When your kidneys don't work, fluid collects in your body. Let your doctor or nurse know if you gain more weight than usual between dialysis treatments.  - Follow a special diet – You will need to limit the amount of fluids you drink. You might also need to avoid foods with a lot of sodium, potassium, and phosphorus. These are minerals that can build up in your body if you have kidney problems.  - Check your vascular access daily for signs of infection such as redness, pus and swelling. Notify your healthcare professional if you notice these signs.  - Keep your catheter bandage clean and dry. If your bandage gets wet, notify your healthcare professional.  - If you have a central line catheter, ask your healthcare professional why it is needed, how long it will be in place, and if you can use a fistula or graft for your dialysis treatment.  - Make sure that all healthcare providers clean their hands with soap and water or alcohol-based hand  before and after caring for you or your vascular access site.      Diagnosis: Gout flare  Assessment and Plan of Treatment: s/p prednisone.   s/p cholchicine. now discontinued due to worsening kidney function.   stable.

## 2022-10-28 NOTE — PROGRESS NOTE ADULT - SUBJECTIVE AND OBJECTIVE BOX
afberile    REVIEW OF SYSTEMS:  GEN: no fever,    no chills  RESP: no SOB,   no cough  CVS: no chest pain,   no palpitations  GI: no abdominal pain,   no nausea,   no vomiting,   no constipation,   no diarrhea  : no dysuria,   no frequency  NEURO: no headache,   no dizziness  PSYCH: no depression,   not anxious  Derm : no rash    MEDICATIONS  (STANDING):  allopurinol 100 milliGRAM(s) Oral daily  aspirin enteric coated 81 milliGRAM(s) Oral daily  atorvastatin 80 milliGRAM(s) Oral at bedtime  carvedilol 25 milliGRAM(s) Oral every 12 hours  colchicine 0.3 milliGRAM(s) Oral every 48 hours  dextrose 5%. 1000 milliLiter(s) (50 mL/Hr) IV Continuous <Continuous>  dextrose 5%. 1000 milliLiter(s) (100 mL/Hr) IV Continuous <Continuous>  dextrose 50% Injectable 25 Gram(s) IV Push once  dextrose 50% Injectable 12.5 Gram(s) IV Push once  dextrose 50% Injectable 25 Gram(s) IV Push once  dextrose Oral Gel 15 Gram(s) Oral once  gabapentin 300 milliGRAM(s) Oral two times a day  glucagon  Injectable 1 milliGRAM(s) IntraMuscular once  heparin   Injectable 5000 Unit(s) SubCutaneous every 12 hours  insulin glargine Injectable (LANTUS) 33 Unit(s) SubCutaneous at bedtime  insulin lispro (ADMELOG) corrective regimen sliding scale   SubCutaneous three times a day before meals  insulin lispro (ADMELOG) corrective regimen sliding scale   SubCutaneous at bedtime  insulin lispro Injectable (ADMELOG) 13 Unit(s) SubCutaneous three times a day before meals  lidocaine/prilocaine Cream 1 Application(s) Topical <User Schedule>  sodium chloride 0.9%. 1000 milliLiter(s) (100 mL/Hr) IV Continuous <Continuous>  tamsulosin 0.4 milliGRAM(s) Oral at bedtime  valsartan 40 milliGRAM(s) Oral daily    MEDICATIONS  (PRN):  oxycodone    5 mG/acetaminophen 325 mG 1 Tablet(s) Oral every 12 hours PRN Moderate Pain (4 - 6)      Vital Signs Last 24 Hrs  T(C): 36.6 (28 Oct 2022 04:13), Max: 37.3 (28 Oct 2022 00:56)  T(F): 97.9 (28 Oct 2022 04:13), Max: 99.1 (28 Oct 2022 00:56)  HR: 70 (28 Oct 2022 04:13) (69 - 80)  BP: 161/81 (28 Oct 2022 04:13) (122/67 - 170/65)  BP(mean): 94 (27 Oct 2022 20:34) (94 - 94)  RR: 18 (28 Oct 2022 04:13) (16 - 18)  SpO2: 99% (28 Oct 2022 04:13) (95% - 99%)    Parameters below as of 28 Oct 2022 04:13  Patient On (Oxygen Delivery Method): room air      CAPILLARY BLOOD GLUCOSE      POCT Blood Glucose.: 213 mg/dL (27 Oct 2022 21:15)  POCT Blood Glucose.: 142 mg/dL (27 Oct 2022 17:34)  POCT Blood Glucose.: 282 mg/dL (27 Oct 2022 12:18)  POCT Blood Glucose.: 271 mg/dL (27 Oct 2022 09:00)    I&O's Summary    27 Oct 2022 07:01  -  28 Oct 2022 07:00  --------------------------------------------------------  IN: 480 mL / OUT: 500 mL / NET: -20 mL        PHYSICAL EXAM:  HEAD:  Atraumatic, Normocephalic  NECK: Supple, No   JVD  CHEST/LUNG:   no     rales,     no,    rhonchi  HEART: Regular rate and rhythm;         murmur  ABDOMEN: Soft, Nontender, ;   EXTREMITIES:     no   edema  NEUROLOGY:  alert    LABS:                        10.4   10.07 )-----------( 340      ( 27 Oct 2022 07:10 )             33.0     10-27    135  |  96  |  70<H>  ----------------------------<  227<H>  4.2   |  22  |  4.91<H>    Ca    8.2<L>      27 Oct 2022 07:14                              Consultant(s) Notes Reviewed:      Care Discussed with Consultants/Other Providers:

## 2022-10-28 NOTE — DIETITIAN INITIAL EVALUATION ADULT - OTHER INFO
Home Insulin: Humalog premeal x3 daily;  Basaglar Kwik pen HS Home Insulin: Humalog premeal x3 daily;  Basaglar Kwik pen HS   Patient is NEW HD yesterday   Potassium wnl, no Phosphorous  value noted in labs, BUN/ creatinine  70/ 4.9

## 2022-10-28 NOTE — PROGRESS NOTE ADULT - SUBJECTIVE AND OBJECTIVE BOX
CC: f/u for fever    Patient reports: he is afebrile, only complaints is of right foot pain, old problem    REVIEW OF SYSTEMS:  All other review of systems negative (Comprehensive ROS)    Antimicrobials Day #  :  Home Medications:    Other Medications Reviewed  MEDICATIONS  (STANDING):  allopurinol 100 milliGRAM(s) Oral daily  aspirin enteric coated 81 milliGRAM(s) Oral daily  atorvastatin 80 milliGRAM(s) Oral at bedtime  carvedilol 25 milliGRAM(s) Oral every 12 hours  colchicine 0.3 milliGRAM(s) Oral every 48 hours  dextrose 5%. 1000 milliLiter(s) (50 mL/Hr) IV Continuous <Continuous>  dextrose 5%. 1000 milliLiter(s) (100 mL/Hr) IV Continuous <Continuous>  dextrose 50% Injectable 25 Gram(s) IV Push once  dextrose 50% Injectable 12.5 Gram(s) IV Push once  dextrose 50% Injectable 25 Gram(s) IV Push once  dextrose Oral Gel 15 Gram(s) Oral once  gabapentin 300 milliGRAM(s) Oral two times a day  glucagon  Injectable 1 milliGRAM(s) IntraMuscular once  heparin   Injectable 5000 Unit(s) SubCutaneous every 12 hours  insulin glargine Injectable (LANTUS) 33 Unit(s) SubCutaneous at bedtime  insulin lispro (ADMELOG) corrective regimen sliding scale   SubCutaneous three times a day before meals  insulin lispro (ADMELOG) corrective regimen sliding scale   SubCutaneous at bedtime  insulin lispro Injectable (ADMELOG) 13 Unit(s) SubCutaneous three times a day before meals  lidocaine/prilocaine Cream 1 Application(s) Topical <User Schedule>  sodium chloride 0.9%. 1000 milliLiter(s) (100 mL/Hr) IV Continuous <Continuous>  tamsulosin 0.4 milliGRAM(s) Oral at bedtime  valsartan 40 milliGRAM(s) Oral daily    T(F): 97.9 (10-28-22 @ 04:13), Max: 99.1 (10-28-22 @ 00:56)  HR: 70 (10-28-22 @ 04:13)  BP: 161/81 (10-28-22 @ 04:13)  RR: 18 (10-28-22 @ 04:13)  SpO2: 99% (10-28-22 @ 04:13)  Wt(kg): --    PHYSICAL EXAM:  General: alert, no acute distress  Eyes:  anicteric, no conjunctival injection, no discharge  Oropharynx: no lesions or injection 	  Neck: supple, without adenopathy  Lungs: clear to auscultation  Heart: regular rate and rhythm; no murmur, rubs or gallops  Abdomen: soft, nondistended, nontender, without mass or organomegaly  Skin: no lesions  Extremities: no clubbing, cyanosis, or edema  Neurologic: alert, oriented, moves all extremities    LAB RESULTS:                        10.4   10.07 )-----------( 340      ( 27 Oct 2022 07:10 )             33.0     10-27    135  |  96  |  70<H>  ----------------------------<  227<H>  4.2   |  22  |  4.91<H>    Ca    8.2<L>      27 Oct 2022 07:14          MICROBIOLOGY:  RECENT CULTURES:      RADIOLOGY REVIEWED:

## 2022-10-28 NOTE — DIETITIAN INITIAL EVALUATION ADULT - PERTINENT LABORATORY DATA
10-27    135  |  96  |  70<H>  ----------------------------<  227<H>  4.2   |  22  |  4.91<H>    Ca    8.2<L>      27 Oct 2022 07:14    POCT Blood Glucose.: 167 mg/dL (10-28-22 @ 08:55)  A1C with Estimated Average Glucose Result: 11.8 % (10-23-22 @ 14:08)  A1C with Estimated Average Glucose Result: 11.7 % (12-07-21 @ 10:23)  A1C with Estimated Average Glucose Result: 11.8 % (12-07-21 @ 00:41)

## 2022-10-28 NOTE — DISCHARGE NOTE PROVIDER - CARE PROVIDER_API CALL
Omayra Wood)  Internal Medicine  [address] [place] [id number]  Phone: [phone]  Fax: [phone]  Follow Up Time: 2 weeks

## 2022-10-28 NOTE — DIETITIAN INITIAL EVALUATION ADULT - PERTINENT MEDS FT
MEDICATIONS  (STANDING):  allopurinol 100 milliGRAM(s) Oral daily  aspirin enteric coated 81 milliGRAM(s) Oral daily  atorvastatin 80 milliGRAM(s) Oral at bedtime  carvedilol 25 milliGRAM(s) Oral every 12 hours  dextrose 5%. 1000 milliLiter(s) (50 mL/Hr) IV Continuous <Continuous>  dextrose 5%. 1000 milliLiter(s) (100 mL/Hr) IV Continuous <Continuous>  dextrose 50% Injectable 25 Gram(s) IV Push once  dextrose 50% Injectable 12.5 Gram(s) IV Push once  dextrose 50% Injectable 25 Gram(s) IV Push once  dextrose Oral Gel 15 Gram(s) Oral once  gabapentin 300 milliGRAM(s) Oral two times a day  glucagon  Injectable 1 milliGRAM(s) IntraMuscular once  heparin   Injectable 5000 Unit(s) SubCutaneous every 12 hours  insulin glargine Injectable (LANTUS) 28 Unit(s) SubCutaneous at bedtime  insulin lispro (ADMELOG) corrective regimen sliding scale   SubCutaneous three times a day before meals  insulin lispro (ADMELOG) corrective regimen sliding scale   SubCutaneous at bedtime  insulin lispro Injectable (ADMELOG) 10 Unit(s) SubCutaneous three times a day before meals  lidocaine/prilocaine Cream 1 Application(s) Topical <User Schedule>  sodium chloride 0.9%. 1000 milliLiter(s) (100 mL/Hr) IV Continuous <Continuous>  tamsulosin 0.4 milliGRAM(s) Oral at bedtime  valsartan 40 milliGRAM(s) Oral daily    MEDICATIONS  (PRN):  oxycodone    5 mG/acetaminophen 325 mG 1 Tablet(s) Oral every 12 hours PRN Moderate Pain (4 - 6)

## 2022-10-28 NOTE — DISCHARGE NOTE PROVIDER - NSDCFUADDINST_GEN_ALL_CORE_FT
You are being discharged to in-patient rehab - follow with the provider(s) at rehab  Make appointment(s) to follow up with your Healthcare providers when you are discharged from Rehab - bring all discharge paperwork including discharge medication list to follow up appointment

## 2022-10-28 NOTE — DIETITIAN INITIAL EVALUATION ADULT - ADD RECOMMEND
Provide diet education written and verbal.  Reinforce diet adherence. Follow up as needed for pt questions

## 2022-10-28 NOTE — PROGRESS NOTE ADULT - SUBJECTIVE AND OBJECTIVE BOX
Chief complaint  Patient is a 62y old  Male who presents with a chief complaint of fevers (28 Oct 2022 09:58)   Review of systems  Patient in bed, looks comfortable, no hypoglycemic episodes.    Labs and Fingersticks  CAPILLARY BLOOD GLUCOSE      POCT Blood Glucose.: 167 mg/dL (28 Oct 2022 08:55)  POCT Blood Glucose.: 213 mg/dL (27 Oct 2022 21:15)  POCT Blood Glucose.: 142 mg/dL (27 Oct 2022 17:34)  POCT Blood Glucose.: 282 mg/dL (27 Oct 2022 12:18)      Anion Gap, Serum: 17 (10-27 @ 07:14)      Calcium, Total Serum: 8.2 *L* (10-27 @ 07:14)          10-27    135  |  96  |  70<H>  ----------------------------<  227<H>  4.2   |  22  |  4.91<H>    Ca    8.2<L>      27 Oct 2022 07:14                          10.4   10.07 )-----------( 340      ( 27 Oct 2022 07:10 )             33.0     Medications  MEDICATIONS  (STANDING):  allopurinol 100 milliGRAM(s) Oral daily  aspirin enteric coated 81 milliGRAM(s) Oral daily  atorvastatin 80 milliGRAM(s) Oral at bedtime  carvedilol 25 milliGRAM(s) Oral every 12 hours  dextrose 5%. 1000 milliLiter(s) (50 mL/Hr) IV Continuous <Continuous>  dextrose 5%. 1000 milliLiter(s) (100 mL/Hr) IV Continuous <Continuous>  dextrose 50% Injectable 25 Gram(s) IV Push once  dextrose 50% Injectable 12.5 Gram(s) IV Push once  dextrose 50% Injectable 25 Gram(s) IV Push once  dextrose Oral Gel 15 Gram(s) Oral once  gabapentin 300 milliGRAM(s) Oral two times a day  glucagon  Injectable 1 milliGRAM(s) IntraMuscular once  heparin   Injectable 5000 Unit(s) SubCutaneous every 12 hours  insulin glargine Injectable (LANTUS) 28 Unit(s) SubCutaneous at bedtime  insulin lispro (ADMELOG) corrective regimen sliding scale   SubCutaneous three times a day before meals  insulin lispro (ADMELOG) corrective regimen sliding scale   SubCutaneous at bedtime  insulin lispro Injectable (ADMELOG) 10 Unit(s) SubCutaneous three times a day before meals  lidocaine/prilocaine Cream 1 Application(s) Topical <User Schedule>  sodium chloride 0.9%. 1000 milliLiter(s) (100 mL/Hr) IV Continuous <Continuous>  tamsulosin 0.4 milliGRAM(s) Oral at bedtime  valsartan 40 milliGRAM(s) Oral daily      Physical Exam  General: Patient comfortable in bed  Vital Signs Last 12 Hrs  T(F): 97.2 (10-28-22 @ 09:37), Max: 99.1 (10-28-22 @ 00:56)  HR: 75 (10-28-22 @ 09:37) (70 - 80)  BP: 142/73 (10-28-22 @ 09:37) (122/67 - 161/81)  BP(mean): --  RR: 18 (10-28-22 @ 09:37) (18 - 18)  SpO2: 97% (10-28-22 @ 09:37) (97% - 99%)  Neck: No palpable thyroid nodules.  CVS: S1S2, No murmurs  Respiratory: No wheezing, no crepitations  GI: Abdomen soft, bowel sounds positive  Musculoskeletal:  edema lower extremities.   Skin: No skin rashes, no ecchymosis    Diagnostics             Chief complaint  Patient is a 62y old  Male who presents with a chief complaint of fevers (28 Oct 2022 09:58)   Review of systems  Patient in bed, looks comfortable, no hypoglycemic episodes.    Labs and Fingersticks  CAPILLARY BLOOD GLUCOSE      POCT Blood Glucose.: 167 mg/dL (28 Oct 2022 08:55)  POCT Blood Glucose.: 213 mg/dL (27 Oct 2022 21:15)  POCT Blood Glucose.: 142 mg/dL (27 Oct 2022 17:34)  POCT Blood Glucose.: 282 mg/dL (27 Oct 2022 12:18)      Anion Gap, Serum: 17 (10-27 @ 07:14)      Calcium, Total Serum: 8.2 *L* (10-27 @ 07:14)          10-27    135  |  96  |  70<H>  ----------------------------<  227<H>  4.2   |  22  |  4.91<H>    Ca    8.2<L>      27 Oct 2022 07:14                          10.4   10.07 )-----------( 340      ( 27 Oct 2022 07:10 )             33.0     Medications  MEDICATIONS  (STANDING):  allopurinol 100 milliGRAM(s) Oral daily  aspirin enteric coated 81 milliGRAM(s) Oral daily  atorvastatin 80 milliGRAM(s) Oral at bedtime  carvedilol 25 milliGRAM(s) Oral every 12 hours  dextrose 5%. 1000 milliLiter(s) (50 mL/Hr) IV Continuous <Continuous>  dextrose 5%. 1000 milliLiter(s) (100 mL/Hr) IV Continuous <Continuous>  dextrose 50% Injectable 25 Gram(s) IV Push once  dextrose 50% Injectable 12.5 Gram(s) IV Push once  dextrose 50% Injectable 25 Gram(s) IV Push once  dextrose Oral Gel 15 Gram(s) Oral once  gabapentin 300 milliGRAM(s) Oral two times a day  glucagon  Injectable 1 milliGRAM(s) IntraMuscular once  heparin   Injectable 5000 Unit(s) SubCutaneous every 12 hours  insulin glargine Injectable (LANTUS) 28 Unit(s) SubCutaneous at bedtime  insulin lispro (ADMELOG) corrective regimen sliding scale   SubCutaneous three times a day before meals  insulin lispro (ADMELOG) corrective regimen sliding scale   SubCutaneous at bedtime  insulin lispro Injectable (ADMELOG) 10 Unit(s) SubCutaneous three times a day before meals  lidocaine/prilocaine Cream 1 Application(s) Topical <User Schedule>  sodium chloride 0.9%. 1000 milliLiter(s) (100 mL/Hr) IV Continuous <Continuous>  tamsulosin 0.4 milliGRAM(s) Oral at bedtime  valsartan 40 milliGRAM(s) Oral daily      Physical Exam  General: Patient comfortable in bed  Vital Signs Last 12 Hrs  T(F): 97.2 (10-28-22 @ 09:37), Max: 99.1 (10-28-22 @ 00:56)  HR: 75 (10-28-22 @ 09:37) (70 - 80)  BP: 142/73 (10-28-22 @ 09:37) (122/67 - 161/81)  BP(mean): --  RR: 18 (10-28-22 @ 09:37) (18 - 18)  SpO2: 97% (10-28-22 @ 09:37) (97% - 99%)  Neck: No palpable thyroid nodules.  CVS: S1S2, No murmurs  Respiratory: No wheezing, no crepitations  GI: Abdomen soft, bowel sounds positive  Musculoskeletal:  edema lower extremities.   Skin: No skin rashes, no ecchymosis    Diagnostics

## 2022-10-28 NOTE — PROGRESS NOTE ADULT - SUBJECTIVE AND OBJECTIVE BOX
NEPHROLOGY-[facility] [phone]        Patient seen and examined in bed.  He was in good spirits         MEDICATIONS  (STANDING):  allopurinol 100 milliGRAM(s) Oral daily  aspirin enteric coated 81 milliGRAM(s) Oral daily  atorvastatin 80 milliGRAM(s) Oral at bedtime  carvedilol 25 milliGRAM(s) Oral every 12 hours  colchicine 0.3 milliGRAM(s) Oral every 48 hours  dextrose 5%. 1000 milliLiter(s) (50 mL/Hr) IV Continuous <Continuous>  dextrose 5%. 1000 milliLiter(s) (100 mL/Hr) IV Continuous <Continuous>  dextrose 50% Injectable 25 Gram(s) IV Push once  dextrose 50% Injectable 12.5 Gram(s) IV Push once  dextrose 50% Injectable 25 Gram(s) IV Push once  dextrose Oral Gel 15 Gram(s) Oral once  gabapentin 300 milliGRAM(s) Oral two times a day  glucagon  Injectable 1 milliGRAM(s) IntraMuscular once  heparin   Injectable 5000 Unit(s) SubCutaneous every 12 hours  insulin glargine Injectable (LANTUS) 28 Unit(s) SubCutaneous at bedtime  insulin lispro (ADMELOG) corrective regimen sliding scale   SubCutaneous three times a day before meals  insulin lispro (ADMELOG) corrective regimen sliding scale   SubCutaneous at bedtime  insulin lispro Injectable (ADMELOG) 10 Unit(s) SubCutaneous three times a day before meals  lidocaine/prilocaine Cream 1 Application(s) Topical <User Schedule>  sodium chloride 0.9%. 1000 milliLiter(s) (100 mL/Hr) IV Continuous <Continuous>  tamsulosin 0.4 milliGRAM(s) Oral at bedtime  valsartan 40 milliGRAM(s) Oral daily      VITAL:  T(C): , Max: 37.3 (10-28-22 @ 00:56)  T(F): , Max: 99.1 (10-28-22 @ 00:56)  HR: 75 (10-28-22 @ 09:37)  BP: 142/73 (10-28-22 @ 09:37)  BP(mean): 94 (10-27-22 @ 20:34)  RR: 18 (10-28-22 @ 09:37)  SpO2: 97% (10-28-22 @ 09:37)  Wt(kg): --    I and O's:    10-27 @ 07:01  -  10-28 @ 07:00  --------------------------------------------------------  IN: 480 mL / OUT: 500 mL / NET: -20 mL          PHYSICAL EXAM:    Constitutional: NAD  Neck:  No JVD  Respiratory: CTAB/L  Cardiovascular: S1 and S2  Gastrointestinal: BS+, soft, NT/ND  Extremities: No peripheral edema  Neurological:    Psychiatric: Normal mood, normal affect  : No Jay  Skin: No rashes  Access: avf    LABS:                        10.4   10.07 )-----------( 340      ( 27 Oct 2022 07:10 )             33.0     10-27    135  |  96  |  70<H>  ----------------------------<  227<H>  4.2   |  22  |  4.91<H>    Ca    8.2<L>      27 Oct 2022 07:14            Urine Studies:          RADIOLOGY & ADDITIONAL STUDIES:

## 2022-10-28 NOTE — DIETITIAN INITIAL EVALUATION ADULT - ORAL INTAKE PTA/DIET HISTORY
patient lives with his wife who cooks his meals. He does not portion his food but generally  "tries to follow a diabetic diet". His stated weight is 190 lbs. He does not know his A1C.  Pt admitted with noted lethargy and weakness, now after new HD yesterday  he feels better today as stated. Pt is interested in meal pattern for his diabetes,  also low sodiunm heart healthy diet. Pt denies n,v,constipation or diarrhea. Appetite good. patient lives with his wife who cooks his meals. He does not portion his food but generally  "tries to follow a diabetic diet". His stated weight is 190 lbs. He does not know his A1C.  Pt admitted with noted lethargy and weakness, now after new HD yesterday  he feels better today as stated. Pt is interested in meal pattern for his diabetes,  also low sodium heart healthy diet. Pt denies n,v,constipation or diarrhea. Appetite good. Patient states he checks hi s FS x4 daily (his wife assists him)and takes his insulin as prescribed. He also denies consuming alcohol or sweets. He was informed of his A1C>11.

## 2022-10-28 NOTE — PROGRESS NOTE ADULT - ASSESSMENT
2 yom   hx of CKD,   s/p DKA, DM.     cardiomyopathy,  CAD w/ stents, HTN, gout     generalized  weakness.  with   c/c h/o b/l leg weakness/ and   b/l arm weakness, r > l      p/w days of generalized weakness and AMS    .Per wife, pt was just in [facility] for DKA.     d/c'd w/ fever and still weak. Since tuesday, pt becoming more lethargic and altered especially when he waking up in morning   glucose,  have been in the 200s since per cont BG monitor. Subjective fevers at home,    Question of if pt has heather compliant w/ short and long acting insulin as wife works nights and is not there to give all meds at home.    Unclear if pt has been falling at home but w/ bruising on his abdomen        *   pt  admitted  with fevers/ ams. / metabolic  encephalopathy   on  arrival, wbc  was12,000         bcx/ ucx,  wa s negative       wa s on   Zosyn        Ct  a/p,   ? cystitis/   ID eval dr harrington    * CKD,  crt is 5,4/ ,  renal  dr dickerson      c/c  anemia     *   DM,  follow  fs     on lantus/  known to  [facility]     *  ef 40     *  c/c  weakness  of   limbs/  neuro d rajeev mccain    has  funcytional  quadriplegia/  CT  head, . 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  old  infarcts    much improved, more  alert/  spoke  with wife/     wife  wants  rehab/,  unable  to take care of  pt       d/ c planning/  and   d/c  order  ha s been  written             rad< from: CT Abdomen and Pelvis No Cont (10.22.22 @ 15:40) >  IMPRESSION:  Mild bladder wall thickening. Correlate with urinalysis  --- End of Report ---  < end of copied text >      rad< from: Transthoracic Echocardiogram (12.07.21 @ 11:24) >  nclusions:  1. Normal mitral valve. Minimal mitral regurgitation.  2. Calcified trileaflet aortic valve with normal opening.  Minimal aortic regurgitation.  3. Mild segmental left ventricular systolic dysfunction.  The basal inferior, basal septum, distal septum, distal  inferior, and the apex are akinetic. Recommend limited  repeat imaging with intravenous echo contrast to better  visualize the apex. (Per sonographer, patient did not agree  to echo contrast during the study.)  4. Mild diastolic dysfunction (Stage I).  5. Normal right ventricular size and function.  *** No previous Echo exam.    < end of copied text >

## 2022-10-28 NOTE — PROGRESS NOTE ADULT - ASSESSMENT
(1)CKD - stage 5 - diabetic nephropathy  (2)LEYDA - prerenally mediated  (3)Fatigue/weakness - improved       RECOMMEND:  (1) HD as outpt now;  If stays then HD on sat   (2)DC colchicine   (3)Dose new meds for GFR <10        Care coordination was contacted and possible dc today to [facility]   [phone]

## 2022-10-28 NOTE — DIETITIAN INITIAL EVALUATION ADULT - NS FNS DIET ORDER
Diet, Consistent Carbohydrate w/Evening Snack:   DASH/TLC {Sodium & Cholesterol Restricted} (DASH) (10-23-22 @ 17:23) [Active]      Consistent carbohydrate w/snack  Dash

## 2022-10-29 LAB
ANION GAP SERPL CALC-SCNC: 13 MMOL/L — SIGNIFICANT CHANGE UP (ref 5–17)
BUN SERPL-MCNC: 75 MG/DL — HIGH (ref 7–23)
CALCIUM SERPL-MCNC: 7.8 MG/DL — LOW (ref 8.4–10.5)
CHLORIDE SERPL-SCNC: 98 MMOL/L — SIGNIFICANT CHANGE UP (ref 96–108)
CO2 SERPL-SCNC: 25 MMOL/L — SIGNIFICANT CHANGE UP (ref 22–31)
CREAT SERPL-MCNC: 5.19 MG/DL — HIGH (ref 0.5–1.3)
EGFR: 12 ML/MIN/1.73M2 — LOW
GLUCOSE BLDC GLUCOMTR-MCNC: 100 MG/DL — HIGH (ref 70–99)
GLUCOSE BLDC GLUCOMTR-MCNC: 103 MG/DL — HIGH (ref 70–99)
GLUCOSE BLDC GLUCOMTR-MCNC: 104 MG/DL — HIGH (ref 70–99)
GLUCOSE BLDC GLUCOMTR-MCNC: 122 MG/DL — HIGH (ref 70–99)
GLUCOSE SERPL-MCNC: 127 MG/DL — HIGH (ref 70–99)
HCT VFR BLD CALC: 32.7 % — LOW (ref 39–50)
HGB BLD-MCNC: 10.2 G/DL — LOW (ref 13–17)
MCHC RBC-ENTMCNC: 28.7 PG — SIGNIFICANT CHANGE UP (ref 27–34)
MCHC RBC-ENTMCNC: 31.2 GM/DL — LOW (ref 32–36)
MCV RBC AUTO: 91.9 FL — SIGNIFICANT CHANGE UP (ref 80–100)
NRBC # BLD: 0 /100 WBCS — SIGNIFICANT CHANGE UP (ref 0–0)
PLATELET # BLD AUTO: 329 K/UL — SIGNIFICANT CHANGE UP (ref 150–400)
POTASSIUM SERPL-MCNC: 3.7 MMOL/L — SIGNIFICANT CHANGE UP (ref 3.5–5.3)
POTASSIUM SERPL-SCNC: 3.7 MMOL/L — SIGNIFICANT CHANGE UP (ref 3.5–5.3)
RBC # BLD: 3.56 M/UL — LOW (ref 4.2–5.8)
RBC # FLD: 14.2 % — SIGNIFICANT CHANGE UP (ref 10.3–14.5)
SODIUM SERPL-SCNC: 136 MMOL/L — SIGNIFICANT CHANGE UP (ref 135–145)
WBC # BLD: 10.13 K/UL — SIGNIFICANT CHANGE UP (ref 3.8–10.5)
WBC # FLD AUTO: 10.13 K/UL — SIGNIFICANT CHANGE UP (ref 3.8–10.5)

## 2022-10-29 RX ORDER — INSULIN LISPRO 100/ML
8 VIAL (ML) SUBCUTANEOUS
Refills: 0 | Status: DISCONTINUED | OUTPATIENT
Start: 2022-10-29 | End: 2022-10-31

## 2022-10-29 RX ORDER — VALSARTAN 80 MG/1
40 TABLET ORAL
Refills: 0 | Status: DISCONTINUED | OUTPATIENT
Start: 2022-10-29 | End: 2022-11-05

## 2022-10-29 RX ORDER — OXYCODONE AND ACETAMINOPHEN 5; 325 MG/1; MG/1
1 TABLET ORAL
Qty: 0 | Refills: 0 | DISCHARGE
Start: 2022-10-29

## 2022-10-29 RX ORDER — LIDOCAINE AND PRILOCAINE CREAM 25; 25 MG/G; MG/G
1 CREAM TOPICAL
Qty: 0 | Refills: 0 | DISCHARGE
Start: 2022-10-29

## 2022-10-29 RX ORDER — INSULIN GLARGINE 100 [IU]/ML
24 INJECTION, SOLUTION SUBCUTANEOUS AT BEDTIME
Refills: 0 | Status: DISCONTINUED | OUTPATIENT
Start: 2022-10-29 | End: 2022-10-31

## 2022-10-29 RX ORDER — VALSARTAN 80 MG/1
1 TABLET ORAL
Qty: 0 | Refills: 0 | DISCHARGE
Start: 2022-10-29

## 2022-10-29 RX ADMIN — INSULIN GLARGINE 24 UNIT(S): 100 INJECTION, SOLUTION SUBCUTANEOUS at 22:10

## 2022-10-29 RX ADMIN — Medication 10 UNIT(S): at 09:07

## 2022-10-29 RX ADMIN — Medication 81 MILLIGRAM(S): at 14:02

## 2022-10-29 RX ADMIN — CARVEDILOL PHOSPHATE 25 MILLIGRAM(S): 80 CAPSULE, EXTENDED RELEASE ORAL at 05:35

## 2022-10-29 RX ADMIN — HEPARIN SODIUM 5000 UNIT(S): 5000 INJECTION INTRAVENOUS; SUBCUTANEOUS at 05:36

## 2022-10-29 RX ADMIN — Medication 100 MILLIGRAM(S): at 14:02

## 2022-10-29 RX ADMIN — ATORVASTATIN CALCIUM 80 MILLIGRAM(S): 80 TABLET, FILM COATED ORAL at 21:49

## 2022-10-29 RX ADMIN — GABAPENTIN 300 MILLIGRAM(S): 400 CAPSULE ORAL at 17:35

## 2022-10-29 RX ADMIN — GABAPENTIN 300 MILLIGRAM(S): 400 CAPSULE ORAL at 05:36

## 2022-10-29 RX ADMIN — CARVEDILOL PHOSPHATE 25 MILLIGRAM(S): 80 CAPSULE, EXTENDED RELEASE ORAL at 17:35

## 2022-10-29 RX ADMIN — OXYCODONE AND ACETAMINOPHEN 1 TABLET(S): 5; 325 TABLET ORAL at 10:41

## 2022-10-29 RX ADMIN — ERYTHROPOIETIN 4000 UNIT(S): 10000 INJECTION, SOLUTION INTRAVENOUS; SUBCUTANEOUS at 10:41

## 2022-10-29 RX ADMIN — TAMSULOSIN HYDROCHLORIDE 0.4 MILLIGRAM(S): 0.4 CAPSULE ORAL at 21:49

## 2022-10-29 RX ADMIN — VALSARTAN 40 MILLIGRAM(S): 80 TABLET ORAL at 05:36

## 2022-10-29 RX ADMIN — Medication 8 UNIT(S): at 17:36

## 2022-10-29 RX ADMIN — VALSARTAN 40 MILLIGRAM(S): 80 TABLET ORAL at 17:35

## 2022-10-29 RX ADMIN — OXYCODONE AND ACETAMINOPHEN 1 TABLET(S): 5; 325 TABLET ORAL at 11:40

## 2022-10-29 RX ADMIN — HEPARIN SODIUM 5000 UNIT(S): 5000 INJECTION INTRAVENOUS; SUBCUTANEOUS at 17:36

## 2022-10-29 NOTE — PROGRESS NOTE ADULT - SUBJECTIVE AND OBJECTIVE BOX
Chief complaint    Patient is a 62y old  Male who presents with a chief complaint of fevers (29 Oct 2022 10:10)   Review of systems  Patient in bed, appears comfortable.    Labs and Fingersticks  CAPILLARY BLOOD GLUCOSE      POCT Blood Glucose.: 122 mg/dL (29 Oct 2022 17:20)  POCT Blood Glucose.: 100 mg/dL (29 Oct 2022 13:56)  POCT Blood Glucose.: 103 mg/dL (29 Oct 2022 08:59)  POCT Blood Glucose.: 128 mg/dL (28 Oct 2022 23:09)      Anion Gap, Serum: 13 (10-29 @ 07:36)      Calcium, Total Serum: 7.8 *L* (10-29 @ 07:36)          10-29    136  |  98  |  75<H>  ----------------------------<  127<H>  3.7   |  25  |  5.19<H>    Ca    7.8<L>      29 Oct 2022 07:36                          10.2   10.13 )-----------( 329      ( 29 Oct 2022 07:37 )             32.7     Medications  MEDICATIONS  (STANDING):  allopurinol 100 milliGRAM(s) Oral daily  aspirin enteric coated 81 milliGRAM(s) Oral daily  atorvastatin 80 milliGRAM(s) Oral at bedtime  carvedilol 25 milliGRAM(s) Oral every 12 hours  dextrose 5%. 1000 milliLiter(s) (50 mL/Hr) IV Continuous <Continuous>  dextrose 5%. 1000 milliLiter(s) (100 mL/Hr) IV Continuous <Continuous>  dextrose 50% Injectable 25 Gram(s) IV Push once  dextrose 50% Injectable 12.5 Gram(s) IV Push once  dextrose 50% Injectable 25 Gram(s) IV Push once  dextrose Oral Gel 15 Gram(s) Oral once  gabapentin 300 milliGRAM(s) Oral two times a day  glucagon  Injectable 1 milliGRAM(s) IntraMuscular once  heparin   Injectable 5000 Unit(s) SubCutaneous every 12 hours  insulin glargine Injectable (LANTUS) 24 Unit(s) SubCutaneous at bedtime  insulin lispro (ADMELOG) corrective regimen sliding scale   SubCutaneous three times a day before meals  insulin lispro (ADMELOG) corrective regimen sliding scale   SubCutaneous at bedtime  insulin lispro Injectable (ADMELOG) 8 Unit(s) SubCutaneous three times a day before meals  lidocaine/prilocaine Cream 1 Application(s) Topical <User Schedule>  sodium chloride 0.9%. 1000 milliLiter(s) (100 mL/Hr) IV Continuous <Continuous>  tamsulosin 0.4 milliGRAM(s) Oral at bedtime  valsartan 40 milliGRAM(s) Oral two times a day      Physical Exam  General: Patient appears comfortable.  Vital Signs Last 12 Hrs  T(F): 98.1 (10-29-22 @ 20:50), Max: 98.1 (10-29-22 @ 20:50)  HR: 64 (10-29-22 @ 20:50) (64 - 75)  BP: 134/80 (10-29-22 @ 20:50) (102/47 - 157/92)  BP(mean): --  RR: 18 (10-29-22 @ 20:50) (18 - 18)  SpO2: 94% (10-29-22 @ 20:50) (94% - 98%)  Neck: No palpable thyroid nodules.  CVS: S1S2, No murmurs  Respiratory: No wheezing, no crepitations  GI: Abdomen soft, non tender.  Musculoskeletal:  edema lower extremities.     Diagnostics    A1C with Estimated Average Glucose: Routine (10-23 @ 07:37)  A1C with Estimated Average Glucose: Routine  Cancel Reason: Duplicate Order (10-23 @ 07:36)  A1C with Estimated Average Glucose: Routine  Cancel Reason: Duplicate Order (10-23 @ 07:35)

## 2022-10-29 NOTE — PROGRESS NOTE ADULT - SUBJECTIVE AND OBJECTIVE BOX
CARDIOLOGY     PROGRESS  NOTE   ________________________________________________    CHIEF COMPLAINT:Patient is a 62y old  Male who presents with a chief complaint of fevers (29 Oct 2022 09:52)  no complain.  	  REVIEW OF SYSTEMS:  CONSTITUTIONAL: No fever, weight loss, or fatigue  EYES: No eye pain, visual disturbances, or discharge  ENT:  No difficulty hearing, tinnitus, vertigo; No sinus or throat pain  NECK: No pain or stiffness  RESPIRATORY: No cough, wheezing, chills or hemoptysis; No Shortness of Breath  CARDIOVASCULAR: No chest pain, palpitations, passing out, dizziness, or leg swelling  GASTROINTESTINAL: No abdominal or epigastric pain. No nausea, vomiting, or hematemesis; No diarrhea or constipation. No melena or hematochezia.  GENITOURINARY: No dysuria, frequency, hematuria, or incontinence  NEUROLOGICAL: No headaches, memory loss, loss of strength, numbness, or tremors  SKIN: No itching, burning, rashes, or lesions   LYMPH Nodes: No enlarged glands  ENDOCRINE: No heat or cold intolerance; No hair loss  MUSCULOSKELETAL: No joint pain or swelling; No muscle, back, or extremity pain  PSYCHIATRIC: No depression, anxiety, mood swings, or difficulty sleeping  HEME/LYMPH: No easy bruising, or bleeding gums  ALLERGY AND IMMUNOLOGIC: No hives or eczema	    [ ] All others negative	  [ x] Unable to obtain    PHYSICAL EXAM:  T(C): 36.6 (10-29-22 @ 09:38), Max: 36.8 (10-28-22 @ 19:30)  HR: 69 (10-29-22 @ 09:38) (66 - 74)  BP: 153/87 (10-29-22 @ 09:38) (117/70 - 153/87)  RR: 18 (10-29-22 @ 09:38) (16 - 18)  SpO2: 97% (10-29-22 @ 09:38) (94% - 99%)  Wt(kg): --  I&O's Summary    28 Oct 2022 07:01  -  29 Oct 2022 07:00  --------------------------------------------------------  IN: 420 mL / OUT: 300 mL / NET: 120 mL    29 Oct 2022 07:01  -  29 Oct 2022 10:10  --------------------------------------------------------  IN: 480 mL / OUT: 150 mL / NET: 330 mL        Appearance: Normal	  HEENT:   Normal oral mucosa, PERRL, EOMI	  Lymphatic: No lymphadenopathy  Cardiovascular: Normal S1 S2, No JVD, + murmurs, No edema  Respiratory: rhonchi  Gastrointestinal:  Soft, Non-tender, + BS	  Skin: No rashes, No ecchymoses, No cyanosis	  Extremities: Normal range of motion, No clubbing, cyanosis or edema  Vascular: Peripheral pulses palpable 2+ bilaterally    MEDICATIONS  (STANDING):  allopurinol 100 milliGRAM(s) Oral daily  aspirin enteric coated 81 milliGRAM(s) Oral daily  atorvastatin 80 milliGRAM(s) Oral at bedtime  carvedilol 25 milliGRAM(s) Oral every 12 hours  dextrose 5%. 1000 milliLiter(s) (50 mL/Hr) IV Continuous <Continuous>  dextrose 5%. 1000 milliLiter(s) (100 mL/Hr) IV Continuous <Continuous>  dextrose 50% Injectable 25 Gram(s) IV Push once  dextrose 50% Injectable 12.5 Gram(s) IV Push once  dextrose 50% Injectable 25 Gram(s) IV Push once  dextrose Oral Gel 15 Gram(s) Oral once  epoetin wayne-epbx (RETACRIT) Injectable 4000 Unit(s) IV Push once  gabapentin 300 milliGRAM(s) Oral two times a day  glucagon  Injectable 1 milliGRAM(s) IntraMuscular once  heparin   Injectable 5000 Unit(s) SubCutaneous every 12 hours  insulin glargine Injectable (LANTUS) 28 Unit(s) SubCutaneous at bedtime  insulin lispro (ADMELOG) corrective regimen sliding scale   SubCutaneous three times a day before meals  insulin lispro (ADMELOG) corrective regimen sliding scale   SubCutaneous at bedtime  insulin lispro Injectable (ADMELOG) 10 Unit(s) SubCutaneous three times a day before meals  lidocaine/prilocaine Cream 1 Application(s) Topical <User Schedule>  sodium chloride 0.9%. 1000 milliLiter(s) (100 mL/Hr) IV Continuous <Continuous>  tamsulosin 0.4 milliGRAM(s) Oral at bedtime  valsartan 40 milliGRAM(s) Oral daily      TELEMETRY: 	    ECG:  	  RADIOLOGY:  OTHER: 	  	  LABS:	 	    CARDIAC MARKERS:                                10.2   10.13 )-----------( 329      ( 29 Oct 2022 07:37 )             32.7     10-29    136  |  98  |  75<H>  ----------------------------<  127<H>  3.7   |  25  |  5.19<H>    Ca    7.8<L>      29 Oct 2022 07:36      proBNP:   Lipid Profile:   HgA1c:   TSH:     < from: TTE with Doppler (w/Cont) (10.28.22 @ 12:56) >  1. Moderate segmental left ventricular systolic  dysfunction.  Hypokinenesis of the inferior, inferolateral  wall, apex, apial septum, antewrolateral wall.  Endocardial visualization enhanced with intravenous  injection of Ultrasonic Enhancing Agent (Definity). No left  ventricular thrombus.  2. Increased E/e'  is consistent with elevated left  ventricular filling pressure.  3. The right ventricle is not well visualized; grossly  normal right ventricular systolic function.    < from: MR Cervical Spine No Cont (10.25.22 @ 22:22) >  1. No acute fracture or AP plane subluxation.  2. No abnormal intramedullary T2 increased signal within imaged portions   of the cervical spinal cord.  3. Mild degenerative changes, as described in detail above.  4. More prominent bulging of disc material is appreciated at T2-T3, with   questionable encroachment on the spinal cord. Please note that axial   imaging was not obtained through this level and further dedicated imaging   of the thoracic spine may be considered, as clinically indicated.      Assessment and plan  ---------------------------  62 yom hx of CKD, DKA, DMtype2, cardiomyopathy w/ stents, HTN, gout p/w days of generalized weakness and AMS. Per wife, pt was just in [facility] for DKA. Was d/c'd w/ fever and still weak. Since tuesday, pt becoming more lethargic and altered especially when he waking up in morning. BG have been in the 200s since per cont BG monitor. Subjective fevers at home, wife feels that his abd is larger than it was before d/c from [facility]. Question of if pt has heather compliant w/ short and long acting insulin as wife works nights and is not there to give all meds at home. Unclear if pt has been falling at home but w/ bruising on his abd.   pt with hx of ashd, chf, cardiomyopathy, dm, s/p CARDIOMEM fu at [facility] with change of mental stratus and sob.  ct scan noted , no active chf  will adjust cardiac meds  will call chf team at [facility] to get Cardiomem readings  beta blocker/ hydralazine and Nitrate  ckd, renal follow up  check pro bnp  dvt prophylaxis  pt with known hx of cad, last stress test  in 07/2021, no ischemia with scar in mary septal and apical area  ct chest abdomen and pelvis noted, no pleural effusion or sign of chf  renal increased renal function last admission CR 4.8  continue asa daily /coreg will add ace as pt on HD  avoid excess fluid, may dc ivf, hx of chf , EF40%  s/p HD yesterday  dc colchicine  MRI noted may consider thoracic MRI  start on ARB while on HD, repeat echo noted with sig lv dysfunction and wall motion abnormality  will consider cardiac cath after hemodynamically more stable  increase fluid withrawal with increase LV pressure  increase valsartan as tolertaed

## 2022-10-29 NOTE — PROGRESS NOTE ADULT - ASSESSMENT
Assessment  DMT2: 62y Male with DM T2 with hyperglycemia admitted with weakness, was on insulin at home,  on basal bolus insulin, blood sugars trending down, insulin dose adjusted, no hypoglycemic episodes,  eating meals, appears comfortable, prednisone DC.  CAD: on medications, monitored, asymptomatic.  HTN: On antihypertensive medications, monitored, asymptomatic.  CKD:  labs, chart reviewed.                  Rubén Escoto MD  Cell:  [phone]  Office: [phone]    Rachel SHORT  Cell:   Office: [phone]

## 2022-10-29 NOTE — PROGRESS NOTE ADULT - SUBJECTIVE AND OBJECTIVE BOX
afberile    REVIEW OF SYSTEMS:  GEN: no fever,    no chills  RESP: no SOB,   no cough  CVS: no chest pain,   no palpitations  GI: no abdominal pain,   no nausea,   no vomiting,   no constipation,   no diarrhea  : no dysuria,   no frequency  NEURO: no headache,   no dizziness  PSYCH: no depression,   not anxious  Derm : no rash    MEDICATIONS  (STANDING):  allopurinol 100 milliGRAM(s) Oral daily  aspirin enteric coated 81 milliGRAM(s) Oral daily  atorvastatin 80 milliGRAM(s) Oral at bedtime  carvedilol 25 milliGRAM(s) Oral every 12 hours  dextrose 5%. 1000 milliLiter(s) (50 mL/Hr) IV Continuous <Continuous>  dextrose 5%. 1000 milliLiter(s) (100 mL/Hr) IV Continuous <Continuous>  dextrose 50% Injectable 25 Gram(s) IV Push once  dextrose 50% Injectable 12.5 Gram(s) IV Push once  dextrose 50% Injectable 25 Gram(s) IV Push once  dextrose Oral Gel 15 Gram(s) Oral once  epoetin wayne-epbx (RETACRIT) Injectable 4000 Unit(s) IV Push once  gabapentin 300 milliGRAM(s) Oral two times a day  glucagon  Injectable 1 milliGRAM(s) IntraMuscular once  heparin   Injectable 5000 Unit(s) SubCutaneous every 12 hours  insulin glargine Injectable (LANTUS) 28 Unit(s) SubCutaneous at bedtime  insulin lispro (ADMELOG) corrective regimen sliding scale   SubCutaneous three times a day before meals  insulin lispro (ADMELOG) corrective regimen sliding scale   SubCutaneous at bedtime  insulin lispro Injectable (ADMELOG) 10 Unit(s) SubCutaneous three times a day before meals  lidocaine/prilocaine Cream 1 Application(s) Topical <User Schedule>  sodium chloride 0.9%. 1000 milliLiter(s) (100 mL/Hr) IV Continuous <Continuous>  tamsulosin 0.4 milliGRAM(s) Oral at bedtime  valsartan 40 milliGRAM(s) Oral daily    MEDICATIONS  (PRN):  oxycodone    5 mG/acetaminophen 325 mG 1 Tablet(s) Oral every 12 hours PRN Moderate Pain (4 - 6)      Vital Signs Last 24 Hrs  T(C): 36.6 (29 Oct 2022 09:38), Max: 36.8 (28 Oct 2022 19:30)  T(F): 97.8 (29 Oct 2022 09:38), Max: 98.2 (28 Oct 2022 19:30)  HR: 69 (29 Oct 2022 09:38) (66 - 74)  BP: 153/87 (29 Oct 2022 09:38) (117/70 - 153/87)  BP(mean): 85 (28 Oct 2022 15:30) (85 - 85)  RR: 18 (29 Oct 2022 09:38) (16 - 18)  SpO2: 97% (29 Oct 2022 09:38) (94% - 99%)    Parameters below as of 29 Oct 2022 09:38  Patient On (Oxygen Delivery Method): room air      CAPILLARY BLOOD GLUCOSE      POCT Blood Glucose.: 103 mg/dL (29 Oct 2022 08:59)  POCT Blood Glucose.: 128 mg/dL (28 Oct 2022 23:09)  POCT Blood Glucose.: 104 mg/dL (28 Oct 2022 21:37)  POCT Blood Glucose.: 84 mg/dL (28 Oct 2022 21:06)  POCT Blood Glucose.: 141 mg/dL (28 Oct 2022 17:10)  POCT Blood Glucose.: 133 mg/dL (28 Oct 2022 12:19)    I&O's Summary    28 Oct 2022 07:01  -  29 Oct 2022 07:00  --------------------------------------------------------  IN: 420 mL / OUT: 300 mL / NET: 120 mL    29 Oct 2022 07:01  -  29 Oct 2022 09:53  --------------------------------------------------------  IN: 480 mL / OUT: 150 mL / NET: 330 mL        PHYSICAL EXAM:  HEAD:  Atraumatic, Normocephalic  NECK: Supple, No   JVD  CHEST/LUNG:   no     rales,     no,    rhonchi  HEART: Regular rate and rhythm;         murmur  ABDOMEN: Soft, Nontender, ;   EXTREMITIES:    no    edema  NEUROLOGY:  alert    LABS:                        10.2   10.13 )-----------( 329      ( 29 Oct 2022 07:37 )             32.7     10-29    136  |  98  |  75<H>  ----------------------------<  127<H>  3.7   |  25  |  5.19<H>    Ca    7.8<L>      29 Oct 2022 07:36                              Consultant(s) Notes Reviewed:      Care Discussed with Consultants/Other Providers:

## 2022-10-29 NOTE — PROGRESS NOTE ADULT - ASSESSMENT
2 yom   hx of CKD,   s/p DKA, DM.     cardiomyopathy,  CAD w/ stents, HTN, gout     generalized  weakness.  with   c/c h/o b/l leg weakness/ and   b/l arm weakness, r > l      p/w days of generalized weakness and AMS    .Per wife, pt was just in [facility] for DKA.     d/c'd w/ fever and still weak. Since tuesday, pt becoming more lethargic and altered especially when he waking up in morning   glucose,  have been in the 200s since per cont BG monitor. Subjective fevers at home,    Question of if pt has heather compliant w/ short and long acting insulin as wife works nights and is not there to give all meds at home.    Unclear if pt has been falling at home but w/ bruising on his abdomen        *   pt  admitted  with fevers/ ams. / metabolic  encephalopathy   on  arrival, wbc  was12,000         bcx/ ucx,  wa s negative       wa s on   Zosyn        Ct  a/p,   ? cystitis/   ID eval dr harrington    * CKD,  crt is 5,4/ ,  renal  dr dickerson      c/c  anemia     *   DM,  follow  fs     on lantus/  known to  [facility]     *  ef 40     *  c/c  weakness  of   limbs/  neuro d rajeev mccain    has  funcytional  quadriplegia/  CT  head, . 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  old  infarcts    much improved, more  alert/  spoke  with wife/     wife  wants  rehab/,  unable  to take care of  pt        d/c  order  has been  written    still awaiting  d/ c to  rehab           rad< from: CT Abdomen and Pelvis No Cont (10.22.22 @ 15:40) >  IMPRESSION:  Mild bladder wall thickening. Correlate with urinalysis  --- End of Report ---  < end of copied text >      rad< from: Transthoracic Echocardiogram (12.07.21 @ 11:24) >  nclusions:  1. Normal mitral valve. Minimal mitral regurgitation.  2. Calcified trileaflet aortic valve with normal opening.  Minimal aortic regurgitation.  3. Mild segmental left ventricular systolic dysfunction.  The basal inferior, basal septum, distal septum, distal  inferior, and the apex are akinetic. Recommend limited  repeat imaging with intravenous echo contrast to better  visualize the apex. (Per sonographer, patient did not agree  to echo contrast during the study.)  4. Mild diastolic dysfunction (Stage I).  5. Normal right ventricular size and function.  *** No previous Echo exam.    < end of copied text >         2 yom   hx of CKD,   s/p DKA, DM.     cardiomyopathy,  CAD w/ stents, HTN, gout     generalized  weakness.  with   c/c h/o b/l leg weakness/ and   b/l arm weakness, r > l      p/w days of generalized weakness and AMS    .Per wife, pt was just in [facility] for DKA.     d/c'd w/ fever and still weak. Since tuesday, pt becoming more lethargic and altered especially when he waking up in morning   glucose,  have been in the 200s since per cont BG monitor. Subjective fevers at home,    Question of if pt has heather compliant w/ short and long acting insulin as wife works nights and is not there to give all meds at home.    Unclear if pt has been falling at home but w/ bruising on his abdomen        *   pt  admitted  with fevers/ ams. / metabolic  encephalopathy   on  arrival, wbc  was12,000         bcx/ ucx,  wa s negative       wa s on   Zosyn        Ct  a/p,   ? cystitis/   ID eval dr harrington    * CKD,  crt is 5,4/ ,  renal  dr dickerson      c/c  anemia     *   DM,  follow  fs     on lantus/  known to  [facility] talley     *  ef 40     *  c/c  weakness  of   limbs/  neuro d rajeev mccain    has  funcytional  quadriplegia/  CT  head, . 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  old  infarcts    much improved, more  alert/  spoke  with wife/     wife  wants  rehab/,  unable  to take care of  pt        echo  from 10/28.  ef  30/  pe r card, needs  cardiac cath   pt  is  deciding/ if  pt refuses, then  may  proceed with   d.c  pt  aware of  risks/  mi/dath           rad< from: CT Abdomen and Pelvis No Cont (10.22.22 @ 15:40) >  IMPRESSION:  Mild bladder wall thickening. Correlate with urinalysis  --- End of Report ---  < end of copied text >      rad< from: Transthoracic Echocardiogram (12.07.21 @ 11:24) >  nclusions:  1. Normal mitral valve. Minimal mitral regurgitation.  2. Calcified trileaflet aortic valve with normal opening.  Minimal aortic regurgitation.  3. Mild segmental left ventricular systolic dysfunction.  The basal inferior, basal septum, distal septum, distal  inferior, and the apex are akinetic. Recommend limited  repeat imaging with intravenous echo contrast to better  visualize the apex. (Per sonographer, patient did not agree  to echo contrast during the study.)  4. Mild diastolic dysfunction (Stage I).  5. Normal right ventricular size and function.  *** No previous Echo exam.    < end of copied text >

## 2022-10-30 LAB
GLUCOSE BLDC GLUCOMTR-MCNC: 105 MG/DL — HIGH (ref 70–99)
GLUCOSE BLDC GLUCOMTR-MCNC: 135 MG/DL — HIGH (ref 70–99)
GLUCOSE BLDC GLUCOMTR-MCNC: 141 MG/DL — HIGH (ref 70–99)
GLUCOSE BLDC GLUCOMTR-MCNC: 254 MG/DL — HIGH (ref 70–99)

## 2022-10-30 RX ADMIN — VALSARTAN 40 MILLIGRAM(S): 80 TABLET ORAL at 18:29

## 2022-10-30 RX ADMIN — CARVEDILOL PHOSPHATE 25 MILLIGRAM(S): 80 CAPSULE, EXTENDED RELEASE ORAL at 18:29

## 2022-10-30 RX ADMIN — Medication 8 UNIT(S): at 18:28

## 2022-10-30 RX ADMIN — HEPARIN SODIUM 5000 UNIT(S): 5000 INJECTION INTRAVENOUS; SUBCUTANEOUS at 05:05

## 2022-10-30 RX ADMIN — Medication 8 UNIT(S): at 13:36

## 2022-10-30 RX ADMIN — HEPARIN SODIUM 5000 UNIT(S): 5000 INJECTION INTRAVENOUS; SUBCUTANEOUS at 18:28

## 2022-10-30 RX ADMIN — Medication 8 UNIT(S): at 09:01

## 2022-10-30 RX ADMIN — Medication 100 MILLIGRAM(S): at 13:36

## 2022-10-30 RX ADMIN — Medication 81 MILLIGRAM(S): at 13:36

## 2022-10-30 RX ADMIN — INSULIN GLARGINE 24 UNIT(S): 100 INJECTION, SOLUTION SUBCUTANEOUS at 21:51

## 2022-10-30 RX ADMIN — CARVEDILOL PHOSPHATE 25 MILLIGRAM(S): 80 CAPSULE, EXTENDED RELEASE ORAL at 05:06

## 2022-10-30 RX ADMIN — GABAPENTIN 300 MILLIGRAM(S): 400 CAPSULE ORAL at 18:29

## 2022-10-30 RX ADMIN — VALSARTAN 40 MILLIGRAM(S): 80 TABLET ORAL at 05:06

## 2022-10-30 RX ADMIN — TAMSULOSIN HYDROCHLORIDE 0.4 MILLIGRAM(S): 0.4 CAPSULE ORAL at 21:52

## 2022-10-30 RX ADMIN — GABAPENTIN 300 MILLIGRAM(S): 400 CAPSULE ORAL at 05:06

## 2022-10-30 RX ADMIN — Medication 3: at 09:01

## 2022-10-30 RX ADMIN — ATORVASTATIN CALCIUM 80 MILLIGRAM(S): 80 TABLET, FILM COATED ORAL at 21:52

## 2022-10-30 NOTE — PROGRESS NOTE ADULT - SUBJECTIVE AND OBJECTIVE BOX
CARDIOLOGY     PROGRESS  NOTE   ________________________________________________    CHIEF COMPLAINT:Patient is a 62y old  Male who presents with a chief complaint of fevers (29 Oct 2022 21:39)  no complain.  	  REVIEW OF SYSTEMS:  CONSTITUTIONAL: No fever, weight loss, or fatigue  EYES: No eye pain, visual disturbances, or discharge  ENT:  No difficulty hearing, tinnitus, vertigo; No sinus or throat pain  NECK: No pain or stiffness  RESPIRATORY: No cough, wheezing, chills or hemoptysis; No Shortness of Breath  CARDIOVASCULAR: No chest pain, palpitations, passing out, dizziness, or leg swelling  GASTROINTESTINAL: No abdominal or epigastric pain. No nausea, vomiting, or hematemesis; No diarrhea or constipation. No melena or hematochezia.  GENITOURINARY: No dysuria, frequency, hematuria, or incontinence  NEUROLOGICAL: No headaches, memory loss, loss of strength, numbness, or tremors  SKIN: No itching, burning, rashes, or lesions   LYMPH Nodes: No enlarged glands  ENDOCRINE: No heat or cold intolerance; No hair loss  MUSCULOSKELETAL: No joint pain or swelling; No muscle, back, or extremity pain  PSYCHIATRIC: No depression, anxiety, mood swings, or difficulty sleeping  HEME/LYMPH: No easy bruising, or bleeding gums  ALLERGY AND IMMUNOLOGIC: No hives or eczema	    [x ] All others negative	  [ ] Unable to obtain    PHYSICAL EXAM:  T(C): 36.8 (10-30-22 @ 08:37), Max: 36.8 (10-30-22 @ 04:49)  HR: 78 (10-30-22 @ 08:37) (64 - 78)  BP: 160/71 (10-30-22 @ 08:37) (102/47 - 160/71)  RR: 18 (10-30-22 @ 08:37) (18 - 18)  SpO2: 98% (10-30-22 @ 08:37) (94% - 98%)  Wt(kg): --  I&O's Summary    29 Oct 2022 07:01  -  30 Oct 2022 07:00  --------------------------------------------------------  IN: 1080 mL / OUT: 150 mL / NET: 930 mL    30 Oct 2022 07:01  -  30 Oct 2022 10:05  --------------------------------------------------------  IN: 240 mL / OUT: 0 mL / NET: 240 mL        Appearance: Normal	  HEENT:   Normal oral mucosa, PERRL, EOMI	  Lymphatic: No lymphadenopathy  Cardiovascular: Normal S1 S2, No JVD, + murmurs, No edema  Respiratory: Lungs clear to auscultation	  Psychiatry: A & O x 3, Mood & affect appropriate  Gastrointestinal:  Soft, Non-tender, + BS	  Skin: No rashes, No ecchymoses, No cyanosis	  Extremities: Normal range of motion, No clubbing, cyanosis or edema  Vascular: Peripheral pulses palpable 2+ bilaterally    MEDICATIONS  (STANDING):  allopurinol 100 milliGRAM(s) Oral daily  aspirin enteric coated 81 milliGRAM(s) Oral daily  atorvastatin 80 milliGRAM(s) Oral at bedtime  carvedilol 25 milliGRAM(s) Oral every 12 hours  dextrose 5%. 1000 milliLiter(s) (50 mL/Hr) IV Continuous <Continuous>  dextrose 5%. 1000 milliLiter(s) (100 mL/Hr) IV Continuous <Continuous>  dextrose 50% Injectable 25 Gram(s) IV Push once  dextrose 50% Injectable 12.5 Gram(s) IV Push once  dextrose 50% Injectable 25 Gram(s) IV Push once  dextrose Oral Gel 15 Gram(s) Oral once  gabapentin 300 milliGRAM(s) Oral two times a day  glucagon  Injectable 1 milliGRAM(s) IntraMuscular once  heparin   Injectable 5000 Unit(s) SubCutaneous every 12 hours  insulin glargine Injectable (LANTUS) 24 Unit(s) SubCutaneous at bedtime  insulin lispro (ADMELOG) corrective regimen sliding scale   SubCutaneous three times a day before meals  insulin lispro (ADMELOG) corrective regimen sliding scale   SubCutaneous at bedtime  insulin lispro Injectable (ADMELOG) 8 Unit(s) SubCutaneous three times a day before meals  lidocaine/prilocaine Cream 1 Application(s) Topical <User Schedule>  sodium chloride 0.9%. 1000 milliLiter(s) (100 mL/Hr) IV Continuous <Continuous>  tamsulosin 0.4 milliGRAM(s) Oral at bedtime  valsartan 40 milliGRAM(s) Oral two times a day      TELEMETRY: 	    ECG:  	  RADIOLOGY:  OTHER: 	  	  LABS:	 	    CARDIAC MARKERS:                                10.2   10.13 )-----------( 329      ( 29 Oct 2022 07:37 )             32.7     10-29    136  |  98  |  75<H>  ----------------------------<  127<H>  3.7   |  25  |  5.19<H>    Ca    7.8<L>      29 Oct 2022 07:36      proBNP:   Lipid Profile:   HgA1c:   TSH:         Assessment and plan  ---------------------------  62 yom hx of CKD, DKA, DMtype2, cardiomyopathy w/ stents, HTN, gout p/w days of generalized weakness and AMS. Per wife, pt was just in [facility] for DKA. Was d/c'd w/ fever and still weak. Since tuesday, pt becoming more lethargic and altered especially when he waking up in morning. BG have been in the 200s since per cont BG monitor. Subjective fevers at home, wife feels that his abd is larger than it was before d/c from [facility]. Question of if pt has heather compliant w/ short and long acting insulin as wife works nights and is not there to give all meds at home. Unclear if pt has been falling at home but w/ bruising on his abd.   pt with hx of ashd, chf, cardiomyopathy, dm, s/p CARDIOMEM fu at [facility] with change of mental stratus and sob.  ct scan noted , no active chf  will adjust cardiac meds  will call chf team at [facility] to get Cardiomem readings  beta blocker/ hydralazine and Nitrate  ckd, renal follow up  check pro bnp  dvt prophylaxis  pt with known hx of cad, last stress test  in 07/2021, no ischemia with scar in mary septal and apical area  ct chest abdomen and pelvis noted, no pleural effusion or sign of chf  renal increased renal function last admission CR 4.8  continue asa daily /coreg will add ace as pt on HD  avoid excess fluid, may dc ivf, hx of chf , EF40%  s/p HD yesterday  dc colchicine  MRI noted may consider thoracic MRI  start on ARB while on HD, repeat echo noted with sig lv dysfunction and wall motion abnormality  will consider cardiac cath after hemodynamically more stable tomorrow, discussed with pt agreed  increase fluid withrawal with increase LV pressure  increase valsartan as tolertaed

## 2022-10-30 NOTE — PROGRESS NOTE ADULT - SUBJECTIVE AND OBJECTIVE BOX
afberile  REVIEW OF SYSTEMS:  GEN: no fever,    no chills  RESP: no SOB,   no cough  CVS: no chest pain,   no palpitations  GI: no abdominal pain,   no nausea,   no vomiting,   no constipation,   no diarrhea  : no dysuria,   no frequency  NEURO: no headache,   no dizziness  PSYCH: no depression,   not anxious  Derm : no rash    MEDICATIONS  (STANDING):  allopurinol 100 milliGRAM(s) Oral daily  aspirin enteric coated 81 milliGRAM(s) Oral daily  atorvastatin 80 milliGRAM(s) Oral at bedtime  carvedilol 25 milliGRAM(s) Oral every 12 hours  dextrose 5%. 1000 milliLiter(s) (50 mL/Hr) IV Continuous <Continuous>  dextrose 5%. 1000 milliLiter(s) (100 mL/Hr) IV Continuous <Continuous>  dextrose 50% Injectable 25 Gram(s) IV Push once  dextrose 50% Injectable 12.5 Gram(s) IV Push once  dextrose 50% Injectable 25 Gram(s) IV Push once  dextrose Oral Gel 15 Gram(s) Oral once  gabapentin 300 milliGRAM(s) Oral two times a day  glucagon  Injectable 1 milliGRAM(s) IntraMuscular once  heparin   Injectable 5000 Unit(s) SubCutaneous every 12 hours  insulin glargine Injectable (LANTUS) 24 Unit(s) SubCutaneous at bedtime  insulin lispro (ADMELOG) corrective regimen sliding scale   SubCutaneous three times a day before meals  insulin lispro (ADMELOG) corrective regimen sliding scale   SubCutaneous at bedtime  insulin lispro Injectable (ADMELOG) 8 Unit(s) SubCutaneous three times a day before meals  lidocaine/prilocaine Cream 1 Application(s) Topical <User Schedule>  sodium chloride 0.9%. 1000 milliLiter(s) (100 mL/Hr) IV Continuous <Continuous>  tamsulosin 0.4 milliGRAM(s) Oral at bedtime  valsartan 40 milliGRAM(s) Oral two times a day    MEDICATIONS  (PRN):  oxycodone    5 mG/acetaminophen 325 mG 1 Tablet(s) Oral every 12 hours PRN Moderate Pain (4 - 6)      Vital Signs Last 24 Hrs  T(C): 36.8 (30 Oct 2022 08:37), Max: 36.8 (30 Oct 2022 04:49)  T(F): 98.2 (30 Oct 2022 08:37), Max: 98.3 (30 Oct 2022 04:49)  HR: 78 (30 Oct 2022 08:37) (64 - 78)  BP: 160/71 (30 Oct 2022 08:37) (124/74 - 160/71)  BP(mean): --  RR: 18 (30 Oct 2022 08:37) (18 - 18)  SpO2: 98% (30 Oct 2022 08:37) (94% - 98%)    Parameters below as of 30 Oct 2022 08:37  Patient On (Oxygen Delivery Method): room air      CAPILLARY BLOOD GLUCOSE      POCT Blood Glucose.: 254 mg/dL (30 Oct 2022 08:54)  POCT Blood Glucose.: 104 mg/dL (29 Oct 2022 21:52)  POCT Blood Glucose.: 122 mg/dL (29 Oct 2022 17:20)  POCT Blood Glucose.: 100 mg/dL (29 Oct 2022 13:56)    I&O's Summary    29 Oct 2022 07:01  -  30 Oct 2022 07:00  --------------------------------------------------------  IN: 1080 mL / OUT: 150 mL / NET: 930 mL    30 Oct 2022 07:01  -  30 Oct 2022 11:56  --------------------------------------------------------  IN: 240 mL / OUT: 0 mL / NET: 240 mL        PHYSICAL EXAM:  HEAD:  Atraumatic, Normocephalic  NECK: Supple, No   JVD  CHEST/LUNG:   no     rales,     no,    rhonchi  HEART: Regular rate and rhythm;         murmur  ABDOMEN: Soft, Nontender, ;   EXTREMITIES:   no     edema  NEUROLOGY:  alert    LABS:                        10.2   10.13 )-----------( 329      ( 29 Oct 2022 07:37 )             32.7     10-29    136  |  98  |  75<H>  ----------------------------<  127<H>  3.7   |  25  |  5.19<H>    Ca    7.8<L>      29 Oct 2022 07:36                              Consultant(s) Notes Reviewed:      Care Discussed with Consultants/Other Providers:

## 2022-10-30 NOTE — PROGRESS NOTE ADULT - ASSESSMENT
2 yom   hx of CKD,   s/p DKA, DM.     cardiomyopathy,  CAD w/ stents, HTN, gout     generalized  weakness.  with   c/c h/o b/l leg weakness/ and   b/l arm weakness, r > l      p/w days of generalized weakness and AMS    per wife, pt was just in [facility] for DKA.  and  was d/c'd w/ fever and still weak.     more lethargic and altered especially when he waking up in morning/ glucose,  have been in the 200s            *   pt  admitted  with fevers/ ams. / metabolic  encephalopathy   on  arrival, wbc  was12,000       bcx/ ucx,  was negative    no source of infection found        was on   Zosyn  ,  Ct  a/p,   ? cystitis/   ID eval dr harrington    * CKD,  crt is 5,4/ ,  renal  dr dickerson     *   c/c  anemia     *   DM,  follow  fs        on lantus/  known to  [facility]     *  ef 40     *   c/c  weakness  of   limbs/  neuro d rajeev mccain       has  functional  quadriplegia/  CT  head, . old  arachnoid  cyst/  no intervention      *    acute  gout, right  wrist, on  prednisone/ colchicine     on dvt  ppx     MRI  head/  C  spine.  old  infarcts    much improved, more  alert/  spoke  with wife/     wife  wants  rehab/,  unable  to take care of  pt      echo  from 10/28.  ef  30/  pe r card, needs  cardiac cath   pt  was  deciding  and now,  wishes to proceed with  cath in am            rad< from: CT Abdomen and Pelvis No Cont (10.22.22 @ 15:40) >  IMPRESSION:  Mild bladder wall thickening. Correlate with urinalysis  --- End of Report ---  < end of copied text >      rad< from: Transthoracic Echocardiogram (12.07.21 @ 11:24) >  nclusions:  1. Normal mitral valve. Minimal mitral regurgitation.  2. Calcified trileaflet aortic valve with normal opening.  Minimal aortic regurgitation.  3. Mild segmental left ventricular systolic dysfunction.  The basal inferior, basal septum, distal septum, distal  inferior, and the apex are akinetic. Recommend limited  repeat imaging with intravenous echo contrast to better  visualize the apex. (Per sonographer, patient did not agree  to echo contrast during the study.)  4. Mild diastolic dysfunction (Stage I).  5. Normal right ventricular size and function.  *** No previous Echo exam.    < end of copied text >

## 2022-10-30 NOTE — PROGRESS NOTE ADULT - ASSESSMENT
Assessment  DMT2: 62y Male with DM T2 with hyperglycemia admitted with weakness, was on insulin at home, now on basal bolus insulin, decreased dose yesterday, blood sugars overall improving, no hypoglycemic episodes, eating meals, appears comfortable, prednisone DC.  CAD: on medications, monitored, asymptomatic.  HTN: On antihypertensive medications, monitored, asymptomatic.  CKD:  labs, chart reviewed.                  Rubén Escoto MD  Cell:  [phone]  Office: [phone]    Rachel SHORT  Cell:   Office: [phone]               Assessment  DMT2: 62y Male with DM T2 with hyperglycemia admitted with weakness, was on insulin at home, now on basal bolus insulin, decreased dose yesterday, blood sugars overall improving, no hypoglycemic  episodes, eating meals, appears comfortable, prednisone DC.  CAD: on medications, monitored, asymptomatic.  HTN: On antihypertensive medications, monitored, asymptomatic.  CKD:  labs, chart reviewed.                  Rubén Escoto MD  Cell:  [phone]  Office: [phone]    Rachel SHORT  Cell:   Office: [phone]

## 2022-10-30 NOTE — PROGRESS NOTE ADULT - SUBJECTIVE AND OBJECTIVE BOX
Chief complaint  Patient is a 62y old  Male who presents with a chief complaint of fevers (30 Oct 2022 11:56)   Review of systems  Patient in bed, looks comfortable, no hypoglycemic episodes.    Labs and Fingersticks  CAPILLARY BLOOD GLUCOSE      POCT Blood Glucose.: 254 mg/dL (30 Oct 2022 08:54)  POCT Blood Glucose.: 104 mg/dL (29 Oct 2022 21:52)  POCT Blood Glucose.: 122 mg/dL (29 Oct 2022 17:20)  POCT Blood Glucose.: 100 mg/dL (29 Oct 2022 13:56)      Anion Gap, Serum: 13 (10-29 @ 07:36)      Calcium, Total Serum: 7.8 *L* (10-29 @ 07:36)          10-29    136  |  98  |  75<H>  ----------------------------<  127<H>  3.7   |  25  |  5.19<H>    Ca    7.8<L>      29 Oct 2022 07:36                          10.2   10.13 )-----------( 329      ( 29 Oct 2022 07:37 )             32.7     Medications  MEDICATIONS  (STANDING):  allopurinol 100 milliGRAM(s) Oral daily  aspirin enteric coated 81 milliGRAM(s) Oral daily  atorvastatin 80 milliGRAM(s) Oral at bedtime  carvedilol 25 milliGRAM(s) Oral every 12 hours  dextrose 5%. 1000 milliLiter(s) (50 mL/Hr) IV Continuous <Continuous>  dextrose 5%. 1000 milliLiter(s) (100 mL/Hr) IV Continuous <Continuous>  dextrose 50% Injectable 25 Gram(s) IV Push once  dextrose 50% Injectable 12.5 Gram(s) IV Push once  dextrose 50% Injectable 25 Gram(s) IV Push once  dextrose Oral Gel 15 Gram(s) Oral once  gabapentin 300 milliGRAM(s) Oral two times a day  glucagon  Injectable 1 milliGRAM(s) IntraMuscular once  heparin   Injectable 5000 Unit(s) SubCutaneous every 12 hours  insulin glargine Injectable (LANTUS) 24 Unit(s) SubCutaneous at bedtime  insulin lispro (ADMELOG) corrective regimen sliding scale   SubCutaneous three times a day before meals  insulin lispro (ADMELOG) corrective regimen sliding scale   SubCutaneous at bedtime  insulin lispro Injectable (ADMELOG) 8 Unit(s) SubCutaneous three times a day before meals  lidocaine/prilocaine Cream 1 Application(s) Topical <User Schedule>  sodium chloride 0.9%. 1000 milliLiter(s) (100 mL/Hr) IV Continuous <Continuous>  tamsulosin 0.4 milliGRAM(s) Oral at bedtime  valsartan 40 milliGRAM(s) Oral two times a day      Physical Exam  General: Patient comfortable in bed  Vital Signs Last 12 Hrs  T(F): 98.2 (10-30-22 @ 08:37), Max: 98.3 (10-30-22 @ 04:49)  HR: 78 (10-30-22 @ 08:37) (76 - 78)  BP: 160/71 (10-30-22 @ 08:37) (124/74 - 160/71)  BP(mean): --  RR: 18 (10-30-22 @ 08:37) (18 - 18)  SpO2: 98% (10-30-22 @ 08:37) (97% - 98%)  Neck: No palpable thyroid nodules.  CVS: S1S2, No murmurs  Respiratory: No wheezing, no crepitations  GI: Abdomen soft, bowel sounds positive  Musculoskeletal:  edema lower extremities.   Skin: No skin rashes, no ecchymosis    Diagnostics             Chief complaint  Patient is a 62y old  Male who presents with a chief complaint of fevers (30 Oct 2022 11:56)   Review of systems  Patient in bed, looks comfortable, no hypoglycemic episodes.    Labs and Fingersticks  CAPILLARY BLOOD GLUCOSE      POCT Blood Glucose.: 254 mg/dL (30 Oct 2022 08:54)  POCT Blood Glucose.: 104 mg/dL (29 Oct 2022 21:52)  POCT Blood Glucose.: 122 mg/dL (29 Oct 2022 17:20)  POCT Blood Glucose.: 100 mg/dL (29 Oct 2022 13:56)      Anion Gap, Serum: 13 (10-29 @ 07:36)      Calcium, Total Serum: 7.8 *L* (10-29 @ 07:36)          10-29    136  |  98  |  75<H>  ----------------------------<  127<H>  3.7   |  25  |  5.19<H>    Ca    7.8<L>      29 Oct 2022 07:36                          10.2   10.13 )-----------( 329      ( 29 Oct 2022 07:37 )             32.7     Medications  MEDICATIONS  (STANDING):  allopurinol 100 milliGRAM(s) Oral daily  aspirin enteric coated 81 milliGRAM(s) Oral daily  atorvastatin 80 milliGRAM(s) Oral at bedtime  carvedilol 25 milliGRAM(s) Oral every 12 hours  dextrose 5%. 1000 milliLiter(s) (50 mL/Hr) IV Continuous <Continuous>  dextrose 5%. 1000 milliLiter(s) (100 mL/Hr) IV Continuous <Continuous>  dextrose 50% Injectable 25 Gram(s) IV Push once  dextrose 50% Injectable 12.5 Gram(s) IV Push once  dextrose 50% Injectable 25 Gram(s) IV Push once  dextrose Oral Gel 15 Gram(s) Oral once  gabapentin 300 milliGRAM(s) Oral two times a day  glucagon  Injectable 1 milliGRAM(s) IntraMuscular once  heparin   Injectable 5000 Unit(s) SubCutaneous every 12 hours  insulin glargine Injectable (LANTUS) 24 Unit(s) SubCutaneous at bedtime  insulin lispro (ADMELOG) corrective regimen sliding scale   SubCutaneous three times a day before meals  insulin lispro (ADMELOG) corrective regimen sliding scale   SubCutaneous at bedtime  insulin lispro Injectable (ADMELOG) 8 Unit(s) SubCutaneous three times a day before meals  lidocaine/prilocaine Cream 1 Application(s) Topical <User Schedule>  sodium chloride 0.9%. 1000 milliLiter(s) (100 mL/Hr) IV Continuous <Continuous>  tamsulosin 0.4 milliGRAM(s) Oral at bedtime  valsartan 40 milliGRAM(s) Oral two times a day      Physical Exam  General: Patient comfortable in bed  Vital Signs Last 12 Hrs  T(F): 98.2 (10-30-22 @ 08:37), Max: 98.3 (10-30-22 @ 04:49)  HR: 78 (10-30-22 @ 08:37) (76 - 78)  BP: 160/71 (10-30-22 @ 08:37) (124/74 - 160/71)  BP(mean): --  RR: 18 (10-30-22 @ 08:37) (18 - 18)  SpO2: 98% (10-30-22 @ 08:37) (97% - 98%)  Neck: No palpable thyroid nodules.  CVS: S1S2, No murmurs  Respiratory: No wheezing, no crepitations  GI: Abdomen soft, bowel sounds positive  Musculoskeletal:  edema lower extremities.   Skin: No skin rashes, no ecchymosis    Diagnostics

## 2022-10-31 LAB
ANION GAP SERPL CALC-SCNC: 11 MMOL/L — SIGNIFICANT CHANGE UP (ref 5–17)
BUN SERPL-MCNC: 60 MG/DL — HIGH (ref 7–23)
CALCIUM SERPL-MCNC: 8 MG/DL — LOW (ref 8.4–10.5)
CHLORIDE SERPL-SCNC: 99 MMOL/L — SIGNIFICANT CHANGE UP (ref 96–108)
CO2 SERPL-SCNC: 27 MMOL/L — SIGNIFICANT CHANGE UP (ref 22–31)
CREAT SERPL-MCNC: 4.81 MG/DL — HIGH (ref 0.5–1.3)
EGFR: 13 ML/MIN/1.73M2 — LOW
GLUCOSE BLDC GLUCOMTR-MCNC: 103 MG/DL — HIGH (ref 70–99)
GLUCOSE BLDC GLUCOMTR-MCNC: 122 MG/DL — HIGH (ref 70–99)
GLUCOSE BLDC GLUCOMTR-MCNC: 154 MG/DL — HIGH (ref 70–99)
GLUCOSE BLDC GLUCOMTR-MCNC: 155 MG/DL — HIGH (ref 70–99)
GLUCOSE BLDC GLUCOMTR-MCNC: 159 MG/DL — HIGH (ref 70–99)
GLUCOSE BLDC GLUCOMTR-MCNC: 63 MG/DL — LOW (ref 70–99)
GLUCOSE SERPL-MCNC: 97 MG/DL — SIGNIFICANT CHANGE UP (ref 70–99)
HCT VFR BLD CALC: 30.2 % — LOW (ref 39–50)
HGB BLD-MCNC: 9.4 G/DL — LOW (ref 13–17)
MCHC RBC-ENTMCNC: 28.1 PG — SIGNIFICANT CHANGE UP (ref 27–34)
MCHC RBC-ENTMCNC: 31.1 GM/DL — LOW (ref 32–36)
MCV RBC AUTO: 90.4 FL — SIGNIFICANT CHANGE UP (ref 80–100)
NRBC # BLD: 0 /100 WBCS — SIGNIFICANT CHANGE UP (ref 0–0)
PLATELET # BLD AUTO: 349 K/UL — SIGNIFICANT CHANGE UP (ref 150–400)
POTASSIUM SERPL-MCNC: 4.4 MMOL/L — SIGNIFICANT CHANGE UP (ref 3.5–5.3)
POTASSIUM SERPL-SCNC: 4.4 MMOL/L — SIGNIFICANT CHANGE UP (ref 3.5–5.3)
RBC # BLD: 3.34 M/UL — LOW (ref 4.2–5.8)
RBC # FLD: 14.6 % — HIGH (ref 10.3–14.5)
SODIUM SERPL-SCNC: 137 MMOL/L — SIGNIFICANT CHANGE UP (ref 135–145)
WBC # BLD: 8.8 K/UL — SIGNIFICANT CHANGE UP (ref 3.8–10.5)
WBC # FLD AUTO: 8.8 K/UL — SIGNIFICANT CHANGE UP (ref 3.8–10.5)

## 2022-10-31 PROCEDURE — 93458 L HRT ARTERY/VENTRICLE ANGIO: CPT | Mod: 26

## 2022-10-31 PROCEDURE — 99152 MOD SED SAME PHYS/QHP 5/>YRS: CPT

## 2022-10-31 PROCEDURE — 93010 ELECTROCARDIOGRAM REPORT: CPT

## 2022-10-31 RX ORDER — INSULIN LISPRO 100/ML
6 VIAL (ML) SUBCUTANEOUS
Refills: 0 | Status: DISCONTINUED | OUTPATIENT
Start: 2022-10-31 | End: 2022-11-02

## 2022-10-31 RX ORDER — INSULIN GLARGINE 100 [IU]/ML
18 INJECTION, SOLUTION SUBCUTANEOUS AT BEDTIME
Refills: 0 | Status: DISCONTINUED | OUTPATIENT
Start: 2022-10-31 | End: 2022-11-02

## 2022-10-31 RX ADMIN — TAMSULOSIN HYDROCHLORIDE 0.4 MILLIGRAM(S): 0.4 CAPSULE ORAL at 21:25

## 2022-10-31 RX ADMIN — Medication 1: at 17:54

## 2022-10-31 RX ADMIN — Medication 6 UNIT(S): at 14:09

## 2022-10-31 RX ADMIN — VALSARTAN 40 MILLIGRAM(S): 80 TABLET ORAL at 17:55

## 2022-10-31 RX ADMIN — CARVEDILOL PHOSPHATE 25 MILLIGRAM(S): 80 CAPSULE, EXTENDED RELEASE ORAL at 17:55

## 2022-10-31 RX ADMIN — Medication 81 MILLIGRAM(S): at 14:09

## 2022-10-31 RX ADMIN — GABAPENTIN 300 MILLIGRAM(S): 400 CAPSULE ORAL at 05:36

## 2022-10-31 RX ADMIN — Medication 1: at 14:09

## 2022-10-31 RX ADMIN — GABAPENTIN 300 MILLIGRAM(S): 400 CAPSULE ORAL at 17:55

## 2022-10-31 RX ADMIN — HEPARIN SODIUM 5000 UNIT(S): 5000 INJECTION INTRAVENOUS; SUBCUTANEOUS at 17:55

## 2022-10-31 RX ADMIN — HEPARIN SODIUM 5000 UNIT(S): 5000 INJECTION INTRAVENOUS; SUBCUTANEOUS at 05:36

## 2022-10-31 RX ADMIN — Medication 6 UNIT(S): at 17:54

## 2022-10-31 RX ADMIN — INSULIN GLARGINE 18 UNIT(S): 100 INJECTION, SOLUTION SUBCUTANEOUS at 21:25

## 2022-10-31 RX ADMIN — ATORVASTATIN CALCIUM 80 MILLIGRAM(S): 80 TABLET, FILM COATED ORAL at 21:25

## 2022-10-31 RX ADMIN — VALSARTAN 40 MILLIGRAM(S): 80 TABLET ORAL at 05:36

## 2022-10-31 RX ADMIN — Medication 100 MILLIGRAM(S): at 14:09

## 2022-10-31 RX ADMIN — CARVEDILOL PHOSPHATE 25 MILLIGRAM(S): 80 CAPSULE, EXTENDED RELEASE ORAL at 05:36

## 2022-10-31 NOTE — PROGRESS NOTE ADULT - ASSESSMENT
(1)CKD - stage 5 - diabetic nephropathy  (2)LEYDA - prerenally mediated  (3)Fatigue/weakness - improved       RECOMMEND:  (1) HD  In am   (2)Cardiac cath today for wall motion abnormalities   (3)Dose new meds for GFR <10      DW cards attd and PCP     [facility]   [facility]   [phone]

## 2022-10-31 NOTE — PROGRESS NOTE ADULT - SUBJECTIVE AND OBJECTIVE BOX
afberile  REVIEW OF SYSTEMS:  GEN: no fever,    no chills  RESP: no SOB,   no cough  CVS: no chest pain,   no palpitations  GI: no abdominal pain,   no nausea,   no vomiting,   no constipation,   no diarrhea  : no dysuria,   no frequency  NEURO: no headache,   no dizziness  PSYCH: no depression,   not anxious  Derm : no rash    MEDICATIONS  (STANDING):  allopurinol 100 milliGRAM(s) Oral daily  aspirin enteric coated 81 milliGRAM(s) Oral daily  atorvastatin 80 milliGRAM(s) Oral at bedtime  carvedilol 25 milliGRAM(s) Oral every 12 hours  dextrose 5%. 1000 milliLiter(s) (100 mL/Hr) IV Continuous <Continuous>  dextrose 5%. 1000 milliLiter(s) (50 mL/Hr) IV Continuous <Continuous>  dextrose 50% Injectable 25 Gram(s) IV Push once  dextrose 50% Injectable 12.5 Gram(s) IV Push once  dextrose 50% Injectable 25 Gram(s) IV Push once  dextrose Oral Gel 15 Gram(s) Oral once  gabapentin 300 milliGRAM(s) Oral two times a day  glucagon  Injectable 1 milliGRAM(s) IntraMuscular once  heparin   Injectable 5000 Unit(s) SubCutaneous every 12 hours  insulin glargine Injectable (LANTUS) 18 Unit(s) SubCutaneous at bedtime  insulin lispro (ADMELOG) corrective regimen sliding scale   SubCutaneous three times a day before meals  insulin lispro (ADMELOG) corrective regimen sliding scale   SubCutaneous at bedtime  insulin lispro Injectable (ADMELOG) 6 Unit(s) SubCutaneous three times a day before meals  lidocaine/prilocaine Cream 1 Application(s) Topical <User Schedule>  sodium chloride 0.9%. 1000 milliLiter(s) (100 mL/Hr) IV Continuous <Continuous>  tamsulosin 0.4 milliGRAM(s) Oral at bedtime  valsartan 40 milliGRAM(s) Oral two times a day    MEDICATIONS  (PRN):      Vital Signs Last 24 Hrs  T(C): 36.6 (31 Oct 2022 05:13), Max: 36.8 (30 Oct 2022 12:49)  T(F): 97.8 (31 Oct 2022 05:13), Max: 98.3 (30 Oct 2022 12:49)  HR: 86 (31 Oct 2022 05:13) (77 - 89)  BP: 147/74 (31 Oct 2022 05:13) (125/73 - 147/74)  BP(mean): --  RR: 18 (31 Oct 2022 05:13) (18 - 18)  SpO2: 97% (31 Oct 2022 05:13) (95% - 98%)    Parameters below as of 31 Oct 2022 05:13  Patient On (Oxygen Delivery Method): room air      CAPILLARY BLOOD GLUCOSE      POCT Blood Glucose.: 103 mg/dL (31 Oct 2022 09:05)  POCT Blood Glucose.: 63 mg/dL (31 Oct 2022 08:36)  POCT Blood Glucose.: 141 mg/dL (30 Oct 2022 21:49)  POCT Blood Glucose.: 105 mg/dL (30 Oct 2022 18:25)  POCT Blood Glucose.: 135 mg/dL (30 Oct 2022 12:41)    I&O's Summary    30 Oct 2022 07:01  -  31 Oct 2022 07:00  --------------------------------------------------------  IN: 720 mL / OUT: 850 mL / NET: -130 mL        PHYSICAL EXAM:  HEAD:  Atraumatic, Normocephalic  NECK: Supple, No   JVD  CHEST/LUNG:   no     rales,     no,    rhonchi  HEART: Regular rate and rhythm;         murmur  ABDOMEN: Soft, Nontender, ;   EXTREMITIES:    no    edema  NEUROLOGY:  alert    LABS:                        9.4    8.80  )-----------( 349      ( 31 Oct 2022 07:19 )             30.2     10-31    137  |  99  |  60<H>  ----------------------------<  97  4.4   |  27  |  4.81<H>    Ca    8.0<L>      31 Oct 2022 07:14                              Consultant(s) Notes Reviewed:      Care Discussed with Consultants/Other Providers:

## 2022-10-31 NOTE — PROGRESS NOTE ADULT - ASSESSMENT
2 yom   hx of CKD,   s/p DKA, DM.     cardiomyopathy,  CAD w/ stents, HTN, gout     generalized  weakness.  with   c/c h/o b/l leg weakness/ and   b/l arm weakness, r > l      p/w days of generalized weakness and AMS    per wife, pt was just in [facility] for DKA.  and  was d/c'd w/ fever and still weak.     more lethargic and altered especially when he waking up in morning/ glucose,  have been in the 200s            *   pt  admitted  with fevers/ ams. / metabolic  encephalopathy   on  arrival, wbc  was12,000       bcx/ ucx,  was negative    no source of infection found        was on   Zosyn  ,  Ct  a/p,   ? cystitis/   ID eval dr harrington    * CKD,  crt is 5,4/ ,  renal  dr dickerson     *   c/c  anemia     *   DM,  follow  fs        on lantus/  known to  endo [facility]ley     *  ef 40     *   c/c  weakness  of   limbs/  neuro d rajeev mccain       has  functional  quadriplegia/  CT  head, . old  arachnoid  cyst/  no intervention      *    acute  gout, right  wrist, on  prednisone/ colchicine     on dvt  ppx     MRI  head/  C  spine.  old  infarcts    much improved, more  alert/  spoke  with wife/     wife  wants  rehab/,  unable  to take care of  pt      echo  from 10/28.  ef  30/  pe r card, needs  cardiac cath  pt   wishes to proceed with  cath / fs  noted. discussed  with  endo,  insulin being lowered           rad< from: CT Abdomen and Pelvis No Cont (10.22.22 @ 15:40) >  IMPRESSION:  Mild bladder wall thickening. Correlate with urinalysis  --- End of Report ---  < end of copied text >      rad< from: Transthoracic Echocardiogram (12.07.21 @ 11:24) >  nclusions:  1. Normal mitral valve. Minimal mitral regurgitation.  2. Calcified trileaflet aortic valve with normal opening.  Minimal aortic regurgitation.  3. Mild segmental left ventricular systolic dysfunction.  The basal inferior, basal septum, distal septum, distal  inferior, and the apex are akinetic. Recommend limited  repeat imaging with intravenous echo contrast to better  visualize the apex. (Per sonographer, patient did not agree  to echo contrast during the study.)  4. Mild diastolic dysfunction (Stage I).  5. Normal right ventricular size and function.  *** No previous Echo exam.    < end of copied text >

## 2022-10-31 NOTE — PROGRESS NOTE ADULT - SUBJECTIVE AND OBJECTIVE BOX
CARDIOLOGY     PROGRESS  NOTE   ________________________________________________    CHIEF COMPLAINT:Patient is a 62y old  Male who presents with a chief complaint of fevers (30 Oct 2022 12:29)  no complain  	  REVIEW OF SYSTEMS:  CONSTITUTIONAL: No fever, weight loss, or fatigue  EYES: No eye pain, visual disturbances, or discharge  ENT:  No difficulty hearing, tinnitus, vertigo; No sinus or throat pain  NECK: No pain or stiffness  RESPIRATORY: No cough, wheezing, chills or hemoptysis; + Shortness of Breath  CARDIOVASCULAR: No chest pain, palpitations, passing out, dizziness, or leg swelling  GASTROINTESTINAL: No abdominal or epigastric pain. No nausea, vomiting, or hematemesis; No diarrhea or constipation. No melena or hematochezia.  GENITOURINARY: No dysuria, frequency, hematuria, or incontinence  NEUROLOGICAL: No headaches, memory loss, loss of strength, numbness, or tremors  SKIN: No itching, burning, rashes, or lesions   LYMPH Nodes: No enlarged glands  ENDOCRINE: No heat or cold intolerance; No hair loss  MUSCULOSKELETAL: No joint pain or swelling; No muscle, back, or extremity pain  PSYCHIATRIC: No depression, anxiety, mood swings, or difficulty sleeping  HEME/LYMPH: No easy bruising, or bleeding gums  ALLERGY AND IMMUNOLOGIC: No hives or eczema	    [ ] All others negative	  [ ] Unable to obtain    PHYSICAL EXAM:  T(C): 36.6 (10-31-22 @ 05:13), Max: 36.8 (10-30-22 @ 08:37)  HR: 86 (10-31-22 @ 05:13) (77 - 89)  BP: 147/74 (10-31-22 @ 05:13) (125/73 - 160/71)  RR: 18 (10-31-22 @ 05:13) (18 - 18)  SpO2: 97% (10-31-22 @ 05:13) (95% - 98%)  Wt(kg): --  I&O's Summary    30 Oct 2022 07:01  -  31 Oct 2022 07:00  --------------------------------------------------------  IN: 720 mL / OUT: 850 mL / NET: -130 mL        Appearance: Normal	  HEENT:   Normal oral mucosa, PERRL, EOMI	  Lymphatic: No lymphadenopathy  Cardiovascular: Normal S1 S2, No JVD, + murmurs, No edema  Respiratory: Lungs clear to auscultation	  Psychiatry: A & O x 3, Mood & affect appropriate  Gastrointestinal:  Soft, Non-tender, + BS	  Skin: No rashes, No ecchymoses, No cyanosis	  Neurologic: Non-focal  Extremities: Normal range of motion, No clubbing, cyanosis or edema  Vascular: Peripheral pulses palpable 2+ bilaterally    MEDICATIONS  (STANDING):  allopurinol 100 milliGRAM(s) Oral daily  aspirin enteric coated 81 milliGRAM(s) Oral daily  atorvastatin 80 milliGRAM(s) Oral at bedtime  carvedilol 25 milliGRAM(s) Oral every 12 hours  dextrose 5%. 1000 milliLiter(s) (100 mL/Hr) IV Continuous <Continuous>  dextrose 5%. 1000 milliLiter(s) (50 mL/Hr) IV Continuous <Continuous>  dextrose 50% Injectable 25 Gram(s) IV Push once  dextrose 50% Injectable 12.5 Gram(s) IV Push once  dextrose 50% Injectable 25 Gram(s) IV Push once  dextrose Oral Gel 15 Gram(s) Oral once  gabapentin 300 milliGRAM(s) Oral two times a day  glucagon  Injectable 1 milliGRAM(s) IntraMuscular once  heparin   Injectable 5000 Unit(s) SubCutaneous every 12 hours  insulin glargine Injectable (LANTUS) 24 Unit(s) SubCutaneous at bedtime  insulin lispro (ADMELOG) corrective regimen sliding scale   SubCutaneous three times a day before meals  insulin lispro (ADMELOG) corrective regimen sliding scale   SubCutaneous at bedtime  insulin lispro Injectable (ADMELOG) 8 Unit(s) SubCutaneous three times a day before meals  lidocaine/prilocaine Cream 1 Application(s) Topical <User Schedule>  sodium chloride 0.9%. 1000 milliLiter(s) (100 mL/Hr) IV Continuous <Continuous>  tamsulosin 0.4 milliGRAM(s) Oral at bedtime  valsartan 40 milliGRAM(s) Oral two times a day      TELEMETRY: 	    ECG:  	  RADIOLOGY:  OTHER: 	  	  LABS:	 	    CARDIAC MARKERS:                                10.2   10.13 )-----------( 329      ( 29 Oct 2022 07:37 )             32.7     10-29    136  |  98  |  75<H>  ----------------------------<  127<H>  3.7   |  25  |  5.19<H>    Ca    7.8<L>      29 Oct 2022 07:36      proBNP:   Lipid Profile:   HgA1c:   TSH:         Assessment and plan  ---------------------------  62 yom hx of CKD, DKA, DMtype2, cardiomyopathy w/ stents, HTN, gout p/w days of generalized weakness and AMS. Per wife, pt was just in [facility] for DKA. Was d/c'd w/ fever and still weak. Since tuesday, pt becoming more lethargic and altered especially when he waking up in morning. BG have been in the 200s since per cont BG monitor. Subjective fevers at home, wife feels that his abd is larger than it was before d/c from [facility]. Question of if pt has heather compliant w/ short and long acting insulin as wife works nights and is not there to give all meds at home. Unclear if pt has been falling at home but w/ bruising on his abd.   pt with hx of ashd, chf, cardiomyopathy, dm, s/p CARDIOMEM fu at [facility] [place] with change of mental stratus and sob.  ct scan noted , no active chf  will adjust cardiac meds  will call chf team at [facility] to get Cardiomem readings  beta blocker/ hydralazine and Nitrate  ckd, renal follow up  check pro bnp  dvt prophylaxis  pt with known hx of cad, last stress test  in 07/2021, no ischemia with scar in mary septal and apical area  ct chest abdomen and pelvis noted, no pleural effusion or sign of chf  renal increased renal function last admission CR 4.8  continue asa daily /coreg will add ace as pt on HD  avoid excess fluid, may dc ivf, hx of chf , EF40%  s/p HD yesterday  dc colchicine  MRI noted may consider thoracic MRI  start on ARB while on HD, repeat echo noted with sig lv dysfunction and wall motion abnormality  will consider cardiac cath after hemodynamically more stable tomorrow, discussed with pt agreed  increase fluid withrawal with increase LV pressure  increase valsartan as tolertaed  cath today

## 2022-10-31 NOTE — PROGRESS NOTE ADULT - SUBJECTIVE AND OBJECTIVE BOX
Chief complaint  Patient is a 62y old  Male who presents with a chief complaint of fevers (31 Oct 2022 10:40)   Review of systems  Patient for cath today, had hypoglycemic episode this AM.    Labs and Fingersticks  CAPILLARY BLOOD GLUCOSE      POCT Blood Glucose.: 155 mg/dL (31 Oct 2022 09:50)  POCT Blood Glucose.: 103 mg/dL (31 Oct 2022 09:05)  POCT Blood Glucose.: 63 mg/dL (31 Oct 2022 08:36)  POCT Blood Glucose.: 141 mg/dL (30 Oct 2022 21:49)  POCT Blood Glucose.: 105 mg/dL (30 Oct 2022 18:25)      Anion Gap, Serum: 11 (10-31 @ 07:14)      Calcium, Total Serum: 8.0 *L* (10-31 @ 07:14)          10-31    137  |  99  |  60<H>  ----------------------------<  97  4.4   |  27  |  4.81<H>    Ca    8.0<L>      31 Oct 2022 07:14                          9.4    8.80  )-----------( 349      ( 31 Oct 2022 07:19 )             30.2     Medications  MEDICATIONS  (STANDING):  allopurinol 100 milliGRAM(s) Oral daily  aspirin enteric coated 81 milliGRAM(s) Oral daily  atorvastatin 80 milliGRAM(s) Oral at bedtime  carvedilol 25 milliGRAM(s) Oral every 12 hours  dextrose 5%. 1000 milliLiter(s) (100 mL/Hr) IV Continuous <Continuous>  dextrose 5%. 1000 milliLiter(s) (50 mL/Hr) IV Continuous <Continuous>  dextrose 50% Injectable 25 Gram(s) IV Push once  dextrose 50% Injectable 12.5 Gram(s) IV Push once  dextrose 50% Injectable 25 Gram(s) IV Push once  dextrose Oral Gel 15 Gram(s) Oral once  gabapentin 300 milliGRAM(s) Oral two times a day  glucagon  Injectable 1 milliGRAM(s) IntraMuscular once  heparin   Injectable 5000 Unit(s) SubCutaneous every 12 hours  insulin glargine Injectable (LANTUS) 18 Unit(s) SubCutaneous at bedtime  insulin lispro (ADMELOG) corrective regimen sliding scale   SubCutaneous three times a day before meals  insulin lispro (ADMELOG) corrective regimen sliding scale   SubCutaneous at bedtime  insulin lispro Injectable (ADMELOG) 6 Unit(s) SubCutaneous three times a day before meals  lidocaine/prilocaine Cream 1 Application(s) Topical <User Schedule>  sodium chloride 0.9%. 1000 milliLiter(s) (100 mL/Hr) IV Continuous <Continuous>  tamsulosin 0.4 milliGRAM(s) Oral at bedtime  valsartan 40 milliGRAM(s) Oral two times a day      Vital Signs Last 12 Hrs  T(F): 97.8 (10-31-22 @ 12:00), Max: 98.1 (10-31-22 @ 09:01)  HR: 88 (10-31-22 @ 13:30) (79 - 101)  BP: 143/68 (10-31-22 @ 13:30) (126/72 - 148/72)  BP(mean): 87 (10-31-22 @ 13:30) (82 - 95)  RR: 17 (10-31-22 @ 13:00) (16 - 19)  SpO2: 95% (10-31-22 @ 13:00) (91% - 100%)           Chief complaint  Patient is a 62y old  Male who presents with a chief complaint of fevers (31 Oct 2022 10:40)   Review of systems  Patient for cath today, had hypoglycemic episode this AM.    Labs and Fingersticks  CAPILLARY BLOOD GLUCOSE      POCT Blood Glucose.: 155 mg/dL (31 Oct 2022 09:50)  POCT Blood Glucose.: 103 mg/dL (31 Oct 2022 09:05)  POCT Blood Glucose.: 63 mg/dL (31 Oct 2022 08:36)  POCT Blood Glucose.: 141 mg/dL (30 Oct 2022 21:49)  POCT Blood Glucose.: 105 mg/dL (30 Oct 2022 18:25)      Anion Gap, Serum: 11 (10-31 @ 07:14)      Calcium, Total Serum: 8.0 *L* (10-31 @ 07:14)          10-31    137  |  99  |  60<H>  ----------------------------<  97  4.4   |  27  |  4.81<H>    Ca    8.0<L>      31 Oct 2022 07:14                          9.4    8.80  )-----------( 349      ( 31 Oct 2022 07:19 )             30.2     Medications  MEDICATIONS  (STANDING):  allopurinol 100 milliGRAM(s) Oral daily  aspirin enteric coated 81 milliGRAM(s) Oral daily  atorvastatin 80 milliGRAM(s) Oral at bedtime  carvedilol 25 milliGRAM(s) Oral every 12 hours  dextrose 5%. 1000 milliLiter(s) (100 mL/Hr) IV Continuous <Continuous>  dextrose 5%. 1000 milliLiter(s) (50 mL/Hr) IV Continuous <Continuous>  dextrose 50% Injectable 25 Gram(s) IV Push once  dextrose 50% Injectable 12.5 Gram(s) IV Push once  dextrose 50% Injectable 25 Gram(s) IV Push once  dextrose Oral Gel 15 Gram(s) Oral once  gabapentin 300 milliGRAM(s) Oral two times a day  glucagon  Injectable 1 milliGRAM(s) IntraMuscular once  heparin   Injectable 5000 Unit(s) SubCutaneous every 12 hours  insulin glargine Injectable (LANTUS) 18 Unit(s) SubCutaneous at bedtime  insulin lispro (ADMELOG) corrective regimen sliding scale   SubCutaneous three times a day before meals  insulin lispro (ADMELOG) corrective regimen sliding scale   SubCutaneous at bedtime  insulin lispro Injectable (ADMELOG) 6 Unit(s) SubCutaneous three times a day before meals  lidocaine/prilocaine Cream 1 Application(s) Topical <User Schedule>  sodium chloride 0.9%. 1000 milliLiter(s) (100 mL/Hr) IV Continuous <Continuous>  tamsulosin 0.4 milliGRAM(s) Oral at bedtime  valsartan 40 milliGRAM(s) Oral two times a day      Vital Signs Last 12 Hrs  T(F): 97.8 (10-31-22 @ 12:00), Max: 98.1 (10-31-22 @ 09:01)  HR: 88 (10-31-22 @ 13:30) (79 - 101)  BP: 143/68 (10-31-22 @ 13:30) (126/72 - 148/72)  BP(mean): 87 (10-31-22 @ 13:30) (82 - 95)  RR: 17 (10-31-22 @ 13:00) (16 - 19)  SpO2: 95% (10-31-22 @ 13:00) (91% - 100%)

## 2022-10-31 NOTE — PROGRESS NOTE ADULT - SUBJECTIVE AND OBJECTIVE BOX
NEPHROLOGY-[facility] [phone]        Patient seen and examined in bed.  He was the same         MEDICATIONS  (STANDING):  allopurinol 100 milliGRAM(s) Oral daily  aspirin enteric coated 81 milliGRAM(s) Oral daily  atorvastatin 80 milliGRAM(s) Oral at bedtime  carvedilol 25 milliGRAM(s) Oral every 12 hours  dextrose 5%. 1000 milliLiter(s) (100 mL/Hr) IV Continuous <Continuous>  dextrose 5%. 1000 milliLiter(s) (50 mL/Hr) IV Continuous <Continuous>  dextrose 50% Injectable 25 Gram(s) IV Push once  dextrose 50% Injectable 12.5 Gram(s) IV Push once  dextrose 50% Injectable 25 Gram(s) IV Push once  dextrose Oral Gel 15 Gram(s) Oral once  gabapentin 300 milliGRAM(s) Oral two times a day  glucagon  Injectable 1 milliGRAM(s) IntraMuscular once  heparin   Injectable 5000 Unit(s) SubCutaneous every 12 hours  insulin glargine Injectable (LANTUS) 18 Unit(s) SubCutaneous at bedtime  insulin lispro (ADMELOG) corrective regimen sliding scale   SubCutaneous three times a day before meals  insulin lispro (ADMELOG) corrective regimen sliding scale   SubCutaneous at bedtime  insulin lispro Injectable (ADMELOG) 6 Unit(s) SubCutaneous three times a day before meals  lidocaine/prilocaine Cream 1 Application(s) Topical <User Schedule>  sodium chloride 0.9%. 1000 milliLiter(s) (100 mL/Hr) IV Continuous <Continuous>  tamsulosin 0.4 milliGRAM(s) Oral at bedtime  valsartan 40 milliGRAM(s) Oral two times a day      VITAL:  T(C): , Max: 36.8 (10-30-22 @ 12:49)  T(F): , Max: 98.3 (10-30-22 @ 12:49)  HR: 80 (10-31-22 @ 09:46)  BP: 131/54 (10-31-22 @ 09:46)  BP(mean): --  RR: 19 (10-31-22 @ 09:46)  SpO2: 93% (10-31-22 @ 09:46)  Wt(kg): --    I and O's:    10-30 @ 07:01  -  10-31 @ 07:00  --------------------------------------------------------  IN: 720 mL / OUT: 850 mL / NET: -130 mL    10-31 @ 07:01  -  10-31 @ 10:41  --------------------------------------------------------  IN: 200 mL / OUT: 0 mL / NET: 200 mL      Height (cm): 170.2 (10-31 @ 09:46)  Weight (kg): 86.2 (10-31 @ 09:46)  BMI (kg/m2): 29.8 (10-31 @ 09:46)  BSA (m2): 1.98 (10-31 @ 09:46)    PHYSICAL EXAM:    Constitutional: NAD  Neck:  No JVD  Respiratory: CTAB/L  Cardiovascular: S1 and S2  Gastrointestinal: BS+, soft, NT/ND  Extremities: No peripheral edema  Neurological:   no focal deficits  Psychiatric: Normal mood, normal affect  : No Jay  Skin: No rashes  Access: avf    LABS:                        9.4    8.80  )-----------( 349      ( 31 Oct 2022 07:19 )             30.2     10-31    137  |  99  |  60<H>  ----------------------------<  97  4.4   |  27  |  4.81<H>    Ca    8.0<L>      31 Oct 2022 07:14            Urine Studies:          RADIOLOGY & ADDITIONAL STUDIES:

## 2022-10-31 NOTE — PROGRESS NOTE ADULT - ASSESSMENT
Assessment  DMT2: 62y Male with DM T2 with hyperglycemia admitted with weakness, was on insulin at home, now on basal bolus insulin, blood sugars trending down, had hypoglycemic episode this AM, scheduled for cath today.  CAD: on medications, monitored, asymptomatic.  HTN: On antihypertensive medications, monitored, asymptomatic.  CKD:  labs, chart reviewed.                  Rubén Escoto MD  Cell:  [phone]  Office: [phone]    Rachel SHORT  Cell:   Office: [phone]               Assessment  DMT2: 62y Male with DM T2 with hyperglycemia admitted with weakness, was on insulin at home, now on basal bolus insulin, blood sugars trending down, had hypoglycemic  episode this AM, scheduled for cath today.  CAD: on medications, monitored, asymptomatic.  HTN: On antihypertensive medications, monitored, asymptomatic.  CKD:  labs, chart reviewed.                  Rubén Escoto MD  Cell:  [phone]  Office: [phone]    Rachel SHORT  Cell:   Office: [phone]

## 2022-11-01 LAB
ANION GAP SERPL CALC-SCNC: 12 MMOL/L — SIGNIFICANT CHANGE UP (ref 5–17)
BUN SERPL-MCNC: 73 MG/DL — HIGH (ref 7–23)
CALCIUM SERPL-MCNC: 8.1 MG/DL — LOW (ref 8.4–10.5)
CHLORIDE SERPL-SCNC: 99 MMOL/L — SIGNIFICANT CHANGE UP (ref 96–108)
CO2 SERPL-SCNC: 27 MMOL/L — SIGNIFICANT CHANGE UP (ref 22–31)
CREAT SERPL-MCNC: 5.14 MG/DL — HIGH (ref 0.5–1.3)
EGFR: 12 ML/MIN/1.73M2 — LOW
GLUCOSE BLDC GLUCOMTR-MCNC: 127 MG/DL — HIGH (ref 70–99)
GLUCOSE BLDC GLUCOMTR-MCNC: 136 MG/DL — HIGH (ref 70–99)
GLUCOSE BLDC GLUCOMTR-MCNC: 180 MG/DL — HIGH (ref 70–99)
GLUCOSE BLDC GLUCOMTR-MCNC: 95 MG/DL — SIGNIFICANT CHANGE UP (ref 70–99)
GLUCOSE SERPL-MCNC: 98 MG/DL — SIGNIFICANT CHANGE UP (ref 70–99)
HCT VFR BLD CALC: 32.5 % — LOW (ref 39–50)
HGB BLD-MCNC: 10.1 G/DL — LOW (ref 13–17)
MCHC RBC-ENTMCNC: 28.5 PG — SIGNIFICANT CHANGE UP (ref 27–34)
MCHC RBC-ENTMCNC: 31.1 GM/DL — LOW (ref 32–36)
MCV RBC AUTO: 91.8 FL — SIGNIFICANT CHANGE UP (ref 80–100)
NRBC # BLD: 0 /100 WBCS — SIGNIFICANT CHANGE UP (ref 0–0)
PLATELET # BLD AUTO: 339 K/UL — SIGNIFICANT CHANGE UP (ref 150–400)
POTASSIUM SERPL-MCNC: 4.7 MMOL/L — SIGNIFICANT CHANGE UP (ref 3.5–5.3)
POTASSIUM SERPL-SCNC: 4.7 MMOL/L — SIGNIFICANT CHANGE UP (ref 3.5–5.3)
RBC # BLD: 3.54 M/UL — LOW (ref 4.2–5.8)
RBC # FLD: 14.6 % — HIGH (ref 10.3–14.5)
SODIUM SERPL-SCNC: 138 MMOL/L — SIGNIFICANT CHANGE UP (ref 135–145)
WBC # BLD: 7.62 K/UL — SIGNIFICANT CHANGE UP (ref 3.8–10.5)
WBC # FLD AUTO: 7.62 K/UL — SIGNIFICANT CHANGE UP (ref 3.8–10.5)

## 2022-11-01 RX ORDER — ERYTHROPOIETIN 10000 [IU]/ML
10000 INJECTION, SOLUTION INTRAVENOUS; SUBCUTANEOUS ONCE
Refills: 0 | Status: COMPLETED | OUTPATIENT
Start: 2022-11-01 | End: 2022-11-01

## 2022-11-01 RX ORDER — CLOPIDOGREL BISULFATE 75 MG/1
75 TABLET, FILM COATED ORAL DAILY
Refills: 0 | Status: DISCONTINUED | OUTPATIENT
Start: 2022-11-01 | End: 2022-11-14

## 2022-11-01 RX ADMIN — VALSARTAN 40 MILLIGRAM(S): 80 TABLET ORAL at 05:26

## 2022-11-01 RX ADMIN — Medication 6 UNIT(S): at 17:56

## 2022-11-01 RX ADMIN — ATORVASTATIN CALCIUM 80 MILLIGRAM(S): 80 TABLET, FILM COATED ORAL at 22:29

## 2022-11-01 RX ADMIN — INSULIN GLARGINE 18 UNIT(S): 100 INJECTION, SOLUTION SUBCUTANEOUS at 22:29

## 2022-11-01 RX ADMIN — HEPARIN SODIUM 5000 UNIT(S): 5000 INJECTION INTRAVENOUS; SUBCUTANEOUS at 17:57

## 2022-11-01 RX ADMIN — GABAPENTIN 300 MILLIGRAM(S): 400 CAPSULE ORAL at 05:26

## 2022-11-01 RX ADMIN — Medication 81 MILLIGRAM(S): at 12:50

## 2022-11-01 RX ADMIN — Medication 100 MILLIGRAM(S): at 12:49

## 2022-11-01 RX ADMIN — Medication 6 UNIT(S): at 12:48

## 2022-11-01 RX ADMIN — Medication 6 UNIT(S): at 08:47

## 2022-11-01 RX ADMIN — CARVEDILOL PHOSPHATE 25 MILLIGRAM(S): 80 CAPSULE, EXTENDED RELEASE ORAL at 05:25

## 2022-11-01 RX ADMIN — TAMSULOSIN HYDROCHLORIDE 0.4 MILLIGRAM(S): 0.4 CAPSULE ORAL at 22:29

## 2022-11-01 RX ADMIN — CLOPIDOGREL BISULFATE 75 MILLIGRAM(S): 75 TABLET, FILM COATED ORAL at 12:50

## 2022-11-01 RX ADMIN — Medication 1: at 17:56

## 2022-11-01 RX ADMIN — ERYTHROPOIETIN 10000 UNIT(S): 10000 INJECTION, SOLUTION INTRAVENOUS; SUBCUTANEOUS at 19:08

## 2022-11-01 RX ADMIN — GABAPENTIN 300 MILLIGRAM(S): 400 CAPSULE ORAL at 17:57

## 2022-11-01 RX ADMIN — HEPARIN SODIUM 5000 UNIT(S): 5000 INJECTION INTRAVENOUS; SUBCUTANEOUS at 05:25

## 2022-11-01 NOTE — PROGRESS NOTE ADULT - SUBJECTIVE AND OBJECTIVE BOX
NEPHROLOGY-[facility] [phone]        Patient seen and examined in bed.  He was the same         MEDICATIONS  (STANDING):  allopurinol 100 milliGRAM(s) Oral daily  aspirin enteric coated 81 milliGRAM(s) Oral daily  atorvastatin 80 milliGRAM(s) Oral at bedtime  carvedilol 25 milliGRAM(s) Oral every 12 hours  clopidogrel Tablet 75 milliGRAM(s) Oral daily  dextrose 5%. 1000 milliLiter(s) (50 mL/Hr) IV Continuous <Continuous>  dextrose 5%. 1000 milliLiter(s) (100 mL/Hr) IV Continuous <Continuous>  dextrose 50% Injectable 25 Gram(s) IV Push once  dextrose 50% Injectable 12.5 Gram(s) IV Push once  dextrose 50% Injectable 25 Gram(s) IV Push once  dextrose Oral Gel 15 Gram(s) Oral once  gabapentin 300 milliGRAM(s) Oral two times a day  glucagon  Injectable 1 milliGRAM(s) IntraMuscular once  heparin   Injectable 5000 Unit(s) SubCutaneous every 12 hours  insulin glargine Injectable (LANTUS) 18 Unit(s) SubCutaneous at bedtime  insulin lispro (ADMELOG) corrective regimen sliding scale   SubCutaneous three times a day before meals  insulin lispro (ADMELOG) corrective regimen sliding scale   SubCutaneous at bedtime  insulin lispro Injectable (ADMELOG) 6 Unit(s) SubCutaneous three times a day before meals  lidocaine/prilocaine Cream 1 Application(s) Topical <User Schedule>  sodium chloride 0.9%. 1000 milliLiter(s) (100 mL/Hr) IV Continuous <Continuous>  tamsulosin 0.4 milliGRAM(s) Oral at bedtime  valsartan 40 milliGRAM(s) Oral two times a day      VITAL:  T(C): , Max: 37.4 (10-31-22 @ 19:34)  T(F): , Max: 99.3 (10-31-22 @ 19:34)  HR: 79 (11-01-22 @ 05:11)  BP: 148/64 (11-01-22 @ 05:11)  BP(mean): 87 (10-31-22 @ 13:30)  RR: 18 (11-01-22 @ 05:11)  SpO2: 98% (11-01-22 @ 05:11)  Wt(kg): --    I and O's:    10-31 @ 07:01 - 11-01 @ 07:00  --------------------------------------------------------  IN: 520 mL / OUT: 0 mL / NET: 520 mL    11-01 @ 07:01 - 11-01 @ 10:48  --------------------------------------------------------  IN: 240 mL / OUT: 300 mL / NET: -60 mL          PHYSICAL EXAM:    Constitutional: NAD  Neck:  No JVD  Respiratory: CTAB/L  Cardiovascular: S1 and S2  Gastrointestinal: BS+, soft, NT/ND  Extremities: No peripheral edema  Neurological:   no focal deficits  Psychiatric: Normal mood, normal affect  : No Jay  Skin: No rashes  Access: av    LABS:                        9.4    8.80  )-----------( 349      ( 31 Oct 2022 07:19 )             30.2     10-31    137  |  99  |  60<H>  ----------------------------<  97  4.4   |  27  |  4.81<H>    Ca    8.0<L>      31 Oct 2022 07:14            Urine Studies:          RADIOLOGY & ADDITIONAL STUDIES:

## 2022-11-01 NOTE — PROGRESS NOTE ADULT - ASSESSMENT
Assessment  DMT2: 62y Male with DM T2 with hyperglycemia admitted with weakness, was on insulin at home, now on basal bolus insulin, decreased dose yesterday, blood sugars improving and in acceptable range now, no new hypoglycemias, eating meals, NAD.  CAD: on medications, monitored, asymptomatic.  HTN: On antihypertensive medications, monitored, asymptomatic.  CKD:  labs, chart reviewed.          Rubén Escoto MD  Cell:  [phone]  Office: [phone]    Rachel SHORT  Cell:   Office:                Assessment  DMT2: 62y Male with DM T2 with hyperglycemia admitted with weakness, was on insulin at home, now on basal bolus insulin, decreased dose yesterday, blood sugars improving and in acceptable  range now, no new hypoglycemias, eating meals, NAD.  CAD: on medications, monitored, asymptomatic.  HTN: On antihypertensive medications, monitored, asymptomatic.  CKD:  labs, chart reviewed.          Rubén Escoto MD  Cell:  [phone]  Office: [phone]    Rachel SHORT  Cell:   Office:

## 2022-11-01 NOTE — PROGRESS NOTE ADULT - SUBJECTIVE AND OBJECTIVE BOX
Chief complaint  Patient is a 62y old  Male who presents with a chief complaint of fevers (01 Nov 2022 10:47)   Review of systems  Patient in bed, looks comfortable, no hypoglycemic episodes.    Labs and Fingersticks  CAPILLARY BLOOD GLUCOSE      POCT Blood Glucose.: 127 mg/dL (01 Nov 2022 08:14)  POCT Blood Glucose.: 122 mg/dL (31 Oct 2022 21:08)  POCT Blood Glucose.: 159 mg/dL (31 Oct 2022 17:17)  POCT Blood Glucose.: 154 mg/dL (31 Oct 2022 13:59)      Anion Gap, Serum: 11 (10-31 @ 07:14)      Calcium, Total Serum: 8.0 *L* (10-31 @ 07:14)          10-31    137  |  99  |  60<H>  ----------------------------<  97  4.4   |  27  |  4.81<H>    Ca    8.0<L>      31 Oct 2022 07:14                          9.4    8.80  )-----------( 349      ( 31 Oct 2022 07:19 )             30.2     Medications  MEDICATIONS  (STANDING):  allopurinol 100 milliGRAM(s) Oral daily  aspirin enteric coated 81 milliGRAM(s) Oral daily  atorvastatin 80 milliGRAM(s) Oral at bedtime  carvedilol 25 milliGRAM(s) Oral every 12 hours  clopidogrel Tablet 75 milliGRAM(s) Oral daily  dextrose 5%. 1000 milliLiter(s) (50 mL/Hr) IV Continuous <Continuous>  dextrose 5%. 1000 milliLiter(s) (100 mL/Hr) IV Continuous <Continuous>  dextrose 50% Injectable 25 Gram(s) IV Push once  dextrose 50% Injectable 12.5 Gram(s) IV Push once  dextrose 50% Injectable 25 Gram(s) IV Push once  dextrose Oral Gel 15 Gram(s) Oral once  epoetin wayne-epbx (RETACRIT) Injectable 59639 Unit(s) IV Push once  gabapentin 300 milliGRAM(s) Oral two times a day  glucagon  Injectable 1 milliGRAM(s) IntraMuscular once  heparin   Injectable 5000 Unit(s) SubCutaneous every 12 hours  insulin glargine Injectable (LANTUS) 18 Unit(s) SubCutaneous at bedtime  insulin lispro (ADMELOG) corrective regimen sliding scale   SubCutaneous three times a day before meals  insulin lispro (ADMELOG) corrective regimen sliding scale   SubCutaneous at bedtime  insulin lispro Injectable (ADMELOG) 6 Unit(s) SubCutaneous three times a day before meals  lidocaine/prilocaine Cream 1 Application(s) Topical <User Schedule>  sodium chloride 0.9%. 1000 milliLiter(s) (100 mL/Hr) IV Continuous <Continuous>  tamsulosin 0.4 milliGRAM(s) Oral at bedtime  valsartan 40 milliGRAM(s) Oral two times a day      Physical Exam  General: Patient comfortable in bed  Vital Signs Last 12 Hrs  T(F): 98.6 (11-01-22 @ 05:11), Max: 98.6 (11-01-22 @ 05:11)  HR: 79 (11-01-22 @ 05:11) (75 - 79)  BP: 148/64 (11-01-22 @ 05:11) (144/80 - 148/64)  BP(mean): --  RR: 18 (11-01-22 @ 05:11) (18 - 18)  SpO2: 98% (11-01-22 @ 05:11) (95% - 98%)  Neck: No palpable thyroid nodules.  CVS: S1S2, No murmurs  Respiratory: No wheezing, no crepitations  GI: Abdomen soft, bowel sounds positive  Musculoskeletal:  edema lower extremities.   Skin: No skin rashes, no ecchymosis    Diagnostics             Chief complaint  Patient is a 62y old  Male who presents with a chief complaint of fevers (01 Nov 2022 10:47)   Review of systems  Patient in bed, looks comfortable, no hypoglycemic episodes.    Labs and Fingersticks  CAPILLARY BLOOD GLUCOSE      POCT Blood Glucose.: 127 mg/dL (01 Nov 2022 08:14)  POCT Blood Glucose.: 122 mg/dL (31 Oct 2022 21:08)  POCT Blood Glucose.: 159 mg/dL (31 Oct 2022 17:17)  POCT Blood Glucose.: 154 mg/dL (31 Oct 2022 13:59)      Anion Gap, Serum: 11 (10-31 @ 07:14)      Calcium, Total Serum: 8.0 *L* (10-31 @ 07:14)          10-31    137  |  99  |  60<H>  ----------------------------<  97  4.4   |  27  |  4.81<H>    Ca    8.0<L>      31 Oct 2022 07:14                          9.4    8.80  )-----------( 349      ( 31 Oct 2022 07:19 )             30.2     Medications  MEDICATIONS  (STANDING):  allopurinol 100 milliGRAM(s) Oral daily  aspirin enteric coated 81 milliGRAM(s) Oral daily  atorvastatin 80 milliGRAM(s) Oral at bedtime  carvedilol 25 milliGRAM(s) Oral every 12 hours  clopidogrel Tablet 75 milliGRAM(s) Oral daily  dextrose 5%. 1000 milliLiter(s) (50 mL/Hr) IV Continuous <Continuous>  dextrose 5%. 1000 milliLiter(s) (100 mL/Hr) IV Continuous <Continuous>  dextrose 50% Injectable 25 Gram(s) IV Push once  dextrose 50% Injectable 12.5 Gram(s) IV Push once  dextrose 50% Injectable 25 Gram(s) IV Push once  dextrose Oral Gel 15 Gram(s) Oral once  epoetin wayne-epbx (RETACRIT) Injectable 84924 Unit(s) IV Push once  gabapentin 300 milliGRAM(s) Oral two times a day  glucagon  Injectable 1 milliGRAM(s) IntraMuscular once  heparin   Injectable 5000 Unit(s) SubCutaneous every 12 hours  insulin glargine Injectable (LANTUS) 18 Unit(s) SubCutaneous at bedtime  insulin lispro (ADMELOG) corrective regimen sliding scale   SubCutaneous three times a day before meals  insulin lispro (ADMELOG) corrective regimen sliding scale   SubCutaneous at bedtime  insulin lispro Injectable (ADMELOG) 6 Unit(s) SubCutaneous three times a day before meals  lidocaine/prilocaine Cream 1 Application(s) Topical <User Schedule>  sodium chloride 0.9%. 1000 milliLiter(s) (100 mL/Hr) IV Continuous <Continuous>  tamsulosin 0.4 milliGRAM(s) Oral at bedtime  valsartan 40 milliGRAM(s) Oral two times a day      Physical Exam  General: Patient comfortable in bed  Vital Signs Last 12 Hrs  T(F): 98.6 (11-01-22 @ 05:11), Max: 98.6 (11-01-22 @ 05:11)  HR: 79 (11-01-22 @ 05:11) (75 - 79)  BP: 148/64 (11-01-22 @ 05:11) (144/80 - 148/64)  BP(mean): --  RR: 18 (11-01-22 @ 05:11) (18 - 18)  SpO2: 98% (11-01-22 @ 05:11) (95% - 98%)  Neck: No palpable thyroid nodules.  CVS: S1S2, No murmurs  Respiratory: No wheezing, no crepitations  GI: Abdomen soft, bowel sounds positive  Musculoskeletal:  edema lower extremities.   Skin: No skin rashes, no ecchymosis    Diagnostics

## 2022-11-01 NOTE — PROGRESS NOTE ADULT - ASSESSMENT
2 yom   hx of CKD,   s/p DKA, DM.     cardiomyopathy,  CAD w/ stents, HTN, gout     generalized  weakness.  with   c/c h/o b/l leg weakness/ and   b/l arm weakness, r > l      p/w days of generalized weakness and AMS    per wife, pt was just in [facility] for DKA.  and  was d/c'd w/ fever and still weak.     more lethargic and altered especially when he waking up in morning/ glucose,  have been in the 200s            *   pt  admitted  with fevers/ ams. / metabolic  encephalopathy   on  arrival, wbc  was12,000       bcx/ ucx,  was negative    no source of infection found        was on   Zosyn  ,  Ct  a/p,   ? cystitis/   ID eval dr harrington    * CKD,  crt is 5,4/ ,  renal  dr dickerson     *   c/c  anemia     *   DM,  follow  fs        on lantus/  known to  [facility]     *  ef 40     *   c/c  weakness  of   limbs/  neuro d r adán       has  functional  quadriplegia/  CT  head, . old  arachnoid  cyst/  no intervention      *    acute  gout, right  wrist, on  prednisone/ colchicine     on dvt  ppx     MRI  head/  C  spine.  old  infarcts    much improved, more  alert/  spoke  with wife/     wife  wants  rehab/,  unable  to take care of  pt      echo  from 10/28.  ef  30/   s/p cath, cad.  LAD/ RCA/ pt refuse d intervention yesterday    PCI   today, pt and  wife in  agreement           rad< from: CT Abdomen and Pelvis No Cont (10.22.22 @ 15:40) >  IMPRESSION:  Mild bladder wall thickening. Correlate with urinalysis  --- End of Report ---  < end of copied text >      rad< from: Transthoracic Echocardiogram (12.07.21 @ 11:24) >  nclusions:  1. Normal mitral valve. Minimal mitral regurgitation.  2. Calcified trileaflet aortic valve with normal opening.  Minimal aortic regurgitation.  3. Mild segmental left ventricular systolic dysfunction.  The basal inferior, basal septum, distal septum, distal  inferior, and the apex are akinetic. Recommend limited  repeat imaging with intravenous echo contrast to better  visualize the apex. (Per sonographer, patient did not agree  to echo contrast during the study.)  4. Mild diastolic dysfunction (Stage I).  5. Normal right ventricular size and function.  *** No previous Echo exam.    < end of copied text >

## 2022-11-01 NOTE — PROGRESS NOTE ADULT - SUBJECTIVE AND OBJECTIVE BOX
CARDIOLOGY     PROGRESS  NOTE   ________________________________________________    CHIEF COMPLAINT:Patient is a 62y old  Male who presents with a chief complaint of fevers (31 Oct 2022 13:44)  no complain.  	  REVIEW OF SYSTEMS:  CONSTITUTIONAL: No fever, weight loss, or fatigue  EYES: No eye pain, visual disturbances, or discharge  ENT:  No difficulty hearing, tinnitus, vertigo; No sinus or throat pain  NECK: No pain or stiffness  RESPIRATORY: No cough, wheezing, chills or hemoptysis; No Shortness of Breath  CARDIOVASCULAR: No chest pain, palpitations, passing out, dizziness, or leg swelling  GASTROINTESTINAL: No abdominal or epigastric pain. No nausea, vomiting, or hematemesis; No diarrhea or constipation. No melena or hematochezia.  GENITOURINARY: No dysuria, frequency, hematuria, or incontinence  NEUROLOGICAL: No headaches, memory loss, loss of strength, numbness, or tremors  SKIN: No itching, burning, rashes, or lesions   LYMPH Nodes: No enlarged glands  ENDOCRINE: No heat or cold intolerance; No hair loss  MUSCULOSKELETAL: No joint pain or swelling; No muscle, back, or extremity pain  PSYCHIATRIC: No depression, anxiety, mood swings, or difficulty sleeping  HEME/LYMPH: No easy bruising, or bleeding gums  ALLERGY AND IMMUNOLOGIC: No hives or eczema	    [x ] All others negative	  [ ] Unable to obtain    PHYSICAL EXAM:  T(C): 37 (11-01-22 @ 05:11), Max: 37.4 (10-31-22 @ 19:34)  HR: 79 (11-01-22 @ 05:11) (72 - 101)  BP: 148/64 (11-01-22 @ 05:11) (126/72 - 160/75)  RR: 18 (11-01-22 @ 05:11) (16 - 19)  SpO2: 98% (11-01-22 @ 05:11) (91% - 100%)  Wt(kg): --  I&O's Summary    31 Oct 2022 07:01  -  01 Nov 2022 07:00  --------------------------------------------------------  IN: 520 mL / OUT: 0 mL / NET: 520 mL        Appearance: Normal	  HEENT:   Normal oral mucosa, PERRL, EOMI	  Lymphatic: No lymphadenopathy  Cardiovascular: Normal S1 S2, No JVD, + murmurs, No edema  Respiratory: Lungs clear to auscultation	  Gastrointestinal:  Soft, Non-tender, + BS	  Skin: No rashes, No ecchymoses, No cyanosis	  Neurologic: Non-focal  Extremities: Normal range of motion, No clubbing, cyanosis or edema  Vascular: Peripheral pulses palpable 2+ bilaterally    MEDICATIONS  (STANDING):  allopurinol 100 milliGRAM(s) Oral daily  aspirin enteric coated 81 milliGRAM(s) Oral daily  atorvastatin 80 milliGRAM(s) Oral at bedtime  carvedilol 25 milliGRAM(s) Oral every 12 hours  dextrose 5%. 1000 milliLiter(s) (50 mL/Hr) IV Continuous <Continuous>  dextrose 5%. 1000 milliLiter(s) (100 mL/Hr) IV Continuous <Continuous>  dextrose 50% Injectable 25 Gram(s) IV Push once  dextrose 50% Injectable 25 Gram(s) IV Push once  dextrose 50% Injectable 12.5 Gram(s) IV Push once  dextrose Oral Gel 15 Gram(s) Oral once  gabapentin 300 milliGRAM(s) Oral two times a day  glucagon  Injectable 1 milliGRAM(s) IntraMuscular once  heparin   Injectable 5000 Unit(s) SubCutaneous every 12 hours  insulin glargine Injectable (LANTUS) 18 Unit(s) SubCutaneous at bedtime  insulin lispro (ADMELOG) corrective regimen sliding scale   SubCutaneous three times a day before meals  insulin lispro (ADMELOG) corrective regimen sliding scale   SubCutaneous at bedtime  insulin lispro Injectable (ADMELOG) 6 Unit(s) SubCutaneous three times a day before meals  lidocaine/prilocaine Cream 1 Application(s) Topical <User Schedule>  sodium chloride 0.9%. 1000 milliLiter(s) (100 mL/Hr) IV Continuous <Continuous>  tamsulosin 0.4 milliGRAM(s) Oral at bedtime  valsartan 40 milliGRAM(s) Oral two times a day      TELEMETRY: 	    ECG:  	  RADIOLOGY:  OTHER: 	  	  LABS:	 	    CARDIAC MARKERS:                                9.4    8.80  )-----------( 349      ( 31 Oct 2022 07:19 )             30.2     10-31    137  |  99  |  60<H>  ----------------------------<  97  4.4   |  27  |  4.81<H>    Ca    8.0<L>      31 Oct 2022 07:14      proBNP:   Lipid Profile:   HgA1c:   TSH:     < from: Cardiac Catheterization (10.31.22 @ 11:24) >  LM   Distal left main: There is a 40 % stenosis.      LAD   Proximal left anterior descending: There is an 80 % stenosis. First  diagonal: Angiography shows mild atherosclerosis.    CX   First obtuse marginal: There is a 50 % stenosis. Distal circumflex:  There is a 60 % stenosis.    RCA   Proximal right coronary artery: There is a 90 % stenosis.      < end of copied text >      Assessment and plan  ---------------------------  62 yom hx of CKD, DKA, DMtype2, cardiomyopathy w/ stents, HTN, gout p/w days of generalized weakness and AMS. Per wife, pt was just in [facility] for DKA. Was d/c'd w/ fever and still weak. Since tuesday, pt becoming more lethargic and altered especially when he waking up in morning. BG have been in the 200s since per cont BG monitor. Subjective fevers at home, wife feels that his abd is larger than it was before d/c from [facility]. Question of if pt has heather compliant w/ short and long acting insulin as wife works nights and is not there to give all meds at home. Unclear if pt has been falling at home but w/ bruising on his abd.   pt with hx of ashd, chf, cardiomyopathy, dm, s/p CARDIOMEM fu at [facility] with change of mental stratus and sob.  ct scan noted , no active chf  will adjust cardiac meds  will call chf team at [facility] to get Cardiomem readings  beta blocker/ hydralazine and Nitrate  ckd, renal follow up  check pro bnp  dvt prophylaxis  pt with known hx of cad, last stress test  in 07/2021, no ischemia with scar in mary septal and apical area  ct chest abdomen and pelvis noted, no pleural effusion or sign of chf  renal increased renal function last admission CR 4.8  continue asa daily /coreg will add ace as pt on HD  avoid excess fluid, may dc ivf, hx of chf , EF40%  s/p HD yesterday  dc colchicine  MRI noted may consider thoracic MRI  start on ARB while on HD, repeat echo noted with sig lv dysfunction and wall motion abnormality  will consider cardiac cath after hemodynamically more stable tomorrow, discussed with pt agreed  increase fluid withrawal with increase LV pressure  increase valsartan as tolertaed  cardiac cath noted , called wife regarding option of intervention left massage

## 2022-11-01 NOTE — PROGRESS NOTE ADULT - ASSESSMENT
(1)CKD - stage 5 - diabetic nephropathy  (2)LEYDA - prerenally mediated  (3)Fatigue/weakness - improved   (4)Triple vessel disease       RECOMMEND:  (1) HD today   (2)Cardiac stent today and would like HD to follow    (3)Dose new meds for GFR <10      DW cards attd    Sayed Sabra   [email or web address]   [phone]

## 2022-11-01 NOTE — PROGRESS NOTE ADULT - SUBJECTIVE AND OBJECTIVE BOX
afberile    REVIEW OF SYSTEMS:  GEN: no fever,    no chills  RESP: no SOB,   no cough  CVS: no chest pain,   no palpitations  GI: no abdominal pain,   no nausea,   no vomiting,   no constipation,   no diarrhea  : no dysuria,   no frequency  NEURO: no headache,   no dizziness  PSYCH: no depression,   not anxious  Derm : no rash    MEDICATIONS  (STANDING):  allopurinol 100 milliGRAM(s) Oral daily  aspirin enteric coated 81 milliGRAM(s) Oral daily  atorvastatin 80 milliGRAM(s) Oral at bedtime  carvedilol 25 milliGRAM(s) Oral every 12 hours  clopidogrel Tablet 75 milliGRAM(s) Oral daily  dextrose 5%. 1000 milliLiter(s) (50 mL/Hr) IV Continuous <Continuous>  dextrose 5%. 1000 milliLiter(s) (100 mL/Hr) IV Continuous <Continuous>  dextrose 50% Injectable 25 Gram(s) IV Push once  dextrose 50% Injectable 12.5 Gram(s) IV Push once  dextrose 50% Injectable 25 Gram(s) IV Push once  dextrose Oral Gel 15 Gram(s) Oral once  gabapentin 300 milliGRAM(s) Oral two times a day  glucagon  Injectable 1 milliGRAM(s) IntraMuscular once  heparin   Injectable 5000 Unit(s) SubCutaneous every 12 hours  insulin glargine Injectable (LANTUS) 18 Unit(s) SubCutaneous at bedtime  insulin lispro (ADMELOG) corrective regimen sliding scale   SubCutaneous three times a day before meals  insulin lispro (ADMELOG) corrective regimen sliding scale   SubCutaneous at bedtime  insulin lispro Injectable (ADMELOG) 6 Unit(s) SubCutaneous three times a day before meals  lidocaine/prilocaine Cream 1 Application(s) Topical <User Schedule>  sodium chloride 0.9%. 1000 milliLiter(s) (100 mL/Hr) IV Continuous <Continuous>  tamsulosin 0.4 milliGRAM(s) Oral at bedtime  valsartan 40 milliGRAM(s) Oral two times a day    MEDICATIONS  (PRN):      Vital Signs Last 24 Hrs  T(C): 37 (01 Nov 2022 05:11), Max: 37.4 (31 Oct 2022 19:34)  T(F): 98.6 (01 Nov 2022 05:11), Max: 99.3 (31 Oct 2022 19:34)  HR: 79 (01 Nov 2022 05:11) (72 - 101)  BP: 148/64 (01 Nov 2022 05:11) (126/72 - 160/75)  BP(mean): 87 (31 Oct 2022 13:30) (82 - 95)  RR: 18 (01 Nov 2022 05:11) (16 - 19)  SpO2: 98% (01 Nov 2022 05:11) (91% - 100%)    Parameters below as of 01 Nov 2022 05:11  Patient On (Oxygen Delivery Method): room air      CAPILLARY BLOOD GLUCOSE      POCT Blood Glucose.: 127 mg/dL (01 Nov 2022 08:14)  POCT Blood Glucose.: 122 mg/dL (31 Oct 2022 21:08)  POCT Blood Glucose.: 159 mg/dL (31 Oct 2022 17:17)  POCT Blood Glucose.: 154 mg/dL (31 Oct 2022 13:59)  POCT Blood Glucose.: 155 mg/dL (31 Oct 2022 09:50)  POCT Blood Glucose.: 103 mg/dL (31 Oct 2022 09:05)    I&O's Summary    31 Oct 2022 07:01  -  01 Nov 2022 07:00  --------------------------------------------------------  IN: 520 mL / OUT: 0 mL / NET: 520 mL        PHYSICAL EXAM:  HEAD:  Atraumatic, Normocephalic  NECK: Supple, No   JVD  CHEST/LUNG:   no     rales,     no,    rhonchi  HEART: Regular rate and rhythm;         murmur  ABDOMEN: Soft, Nontender, ;   EXTREMITIES:    no    edema  NEUROLOGY:  alert    LABS:                        9.4    8.80  )-----------( 349      ( 31 Oct 2022 07:19 )             30.2     10-31    137  |  99  |  60<H>  ----------------------------<  97  4.4   |  27  |  4.81<H>    Ca    8.0<L>      31 Oct 2022 07:14                              Consultant(s) Notes Reviewed:      Care Discussed with Consultants/Other Providers:

## 2022-11-02 LAB
ANION GAP SERPL CALC-SCNC: 12 MMOL/L — SIGNIFICANT CHANGE UP (ref 5–17)
BUN SERPL-MCNC: 37 MG/DL — HIGH (ref 7–23)
CALCIUM SERPL-MCNC: 8.6 MG/DL — SIGNIFICANT CHANGE UP (ref 8.4–10.5)
CHLORIDE SERPL-SCNC: 97 MMOL/L — SIGNIFICANT CHANGE UP (ref 96–108)
CO2 SERPL-SCNC: 29 MMOL/L — SIGNIFICANT CHANGE UP (ref 22–31)
CREAT SERPL-MCNC: 3.32 MG/DL — HIGH (ref 0.5–1.3)
EGFR: 20 ML/MIN/1.73M2 — LOW
GLUCOSE BLDC GLUCOMTR-MCNC: 115 MG/DL — HIGH (ref 70–99)
GLUCOSE BLDC GLUCOMTR-MCNC: 132 MG/DL — HIGH (ref 70–99)
GLUCOSE BLDC GLUCOMTR-MCNC: 58 MG/DL — LOW (ref 70–99)
GLUCOSE BLDC GLUCOMTR-MCNC: 60 MG/DL — LOW (ref 70–99)
GLUCOSE BLDC GLUCOMTR-MCNC: 94 MG/DL — SIGNIFICANT CHANGE UP (ref 70–99)
GLUCOSE SERPL-MCNC: 85 MG/DL — SIGNIFICANT CHANGE UP (ref 70–99)
HCT VFR BLD CALC: 32.8 % — LOW (ref 39–50)
HGB BLD-MCNC: 10.2 G/DL — LOW (ref 13–17)
MCHC RBC-ENTMCNC: 28.2 PG — SIGNIFICANT CHANGE UP (ref 27–34)
MCHC RBC-ENTMCNC: 31.1 GM/DL — LOW (ref 32–36)
MCV RBC AUTO: 90.6 FL — SIGNIFICANT CHANGE UP (ref 80–100)
NRBC # BLD: 0 /100 WBCS — SIGNIFICANT CHANGE UP (ref 0–0)
PLATELET # BLD AUTO: 338 K/UL — SIGNIFICANT CHANGE UP (ref 150–400)
POTASSIUM SERPL-MCNC: 4.5 MMOL/L — SIGNIFICANT CHANGE UP (ref 3.5–5.3)
POTASSIUM SERPL-SCNC: 4.5 MMOL/L — SIGNIFICANT CHANGE UP (ref 3.5–5.3)
RBC # BLD: 3.62 M/UL — LOW (ref 4.2–5.8)
RBC # FLD: 14.4 % — SIGNIFICANT CHANGE UP (ref 10.3–14.5)
SARS-COV-2 RNA SPEC QL NAA+PROBE: SIGNIFICANT CHANGE UP
SODIUM SERPL-SCNC: 138 MMOL/L — SIGNIFICANT CHANGE UP (ref 135–145)
WBC # BLD: 8.3 K/UL — SIGNIFICANT CHANGE UP (ref 3.8–10.5)
WBC # FLD AUTO: 8.3 K/UL — SIGNIFICANT CHANGE UP (ref 3.8–10.5)

## 2022-11-02 PROCEDURE — 99152 MOD SED SAME PHYS/QHP 5/>YRS: CPT

## 2022-11-02 PROCEDURE — 92928 PRQ TCAT PLMT NTRAC ST 1 LES: CPT | Mod: RC

## 2022-11-02 PROCEDURE — 93010 ELECTROCARDIOGRAM REPORT: CPT

## 2022-11-02 RX ORDER — INSULIN GLARGINE 100 [IU]/ML
14 INJECTION, SOLUTION SUBCUTANEOUS AT BEDTIME
Refills: 0 | Status: DISCONTINUED | OUTPATIENT
Start: 2022-11-02 | End: 2022-11-03

## 2022-11-02 RX ORDER — INSULIN LISPRO 100/ML
5 VIAL (ML) SUBCUTANEOUS
Refills: 0 | Status: DISCONTINUED | OUTPATIENT
Start: 2022-11-02 | End: 2022-11-03

## 2022-11-02 RX ORDER — DEXTROSE 50 % IN WATER 50 %
12.5 SYRINGE (ML) INTRAVENOUS ONCE
Refills: 0 | Status: COMPLETED | OUTPATIENT
Start: 2022-11-02 | End: 2022-11-02

## 2022-11-02 RX ADMIN — CARVEDILOL PHOSPHATE 25 MILLIGRAM(S): 80 CAPSULE, EXTENDED RELEASE ORAL at 05:22

## 2022-11-02 RX ADMIN — HEPARIN SODIUM 5000 UNIT(S): 5000 INJECTION INTRAVENOUS; SUBCUTANEOUS at 05:22

## 2022-11-02 RX ADMIN — CLOPIDOGREL BISULFATE 75 MILLIGRAM(S): 75 TABLET, FILM COATED ORAL at 11:18

## 2022-11-02 RX ADMIN — Medication 100 MILLIGRAM(S): at 11:17

## 2022-11-02 RX ADMIN — Medication 81 MILLIGRAM(S): at 11:18

## 2022-11-02 RX ADMIN — ATORVASTATIN CALCIUM 80 MILLIGRAM(S): 80 TABLET, FILM COATED ORAL at 22:04

## 2022-11-02 RX ADMIN — VALSARTAN 40 MILLIGRAM(S): 80 TABLET ORAL at 05:41

## 2022-11-02 RX ADMIN — GABAPENTIN 300 MILLIGRAM(S): 400 CAPSULE ORAL at 05:22

## 2022-11-02 RX ADMIN — Medication 12.5 GRAM(S): at 10:15

## 2022-11-02 RX ADMIN — TAMSULOSIN HYDROCHLORIDE 0.4 MILLIGRAM(S): 0.4 CAPSULE ORAL at 22:04

## 2022-11-02 RX ADMIN — VALSARTAN 40 MILLIGRAM(S): 80 TABLET ORAL at 19:05

## 2022-11-02 RX ADMIN — GABAPENTIN 300 MILLIGRAM(S): 400 CAPSULE ORAL at 19:05

## 2022-11-02 RX ADMIN — CARVEDILOL PHOSPHATE 25 MILLIGRAM(S): 80 CAPSULE, EXTENDED RELEASE ORAL at 19:05

## 2022-11-02 NOTE — PROGRESS NOTE ADULT - SUBJECTIVE AND OBJECTIVE BOX
Chief complaint  Patient is a 62y old  Male who presents with a chief complaint of fevers (02 Nov 2022 10:42)   Review of systems  Patient in bed, looks comfortable, had hypoglycemic episode this AM.    Labs and Fingersticks  CAPILLARY BLOOD GLUCOSE      POCT Blood Glucose.: 115 mg/dL (02 Nov 2022 10:31)  POCT Blood Glucose.: 60 mg/dL (02 Nov 2022 10:04)  POCT Blood Glucose.: 58 mg/dL (02 Nov 2022 10:03)  POCT Blood Glucose.: 136 mg/dL (01 Nov 2022 22:28)  POCT Blood Glucose.: 180 mg/dL (01 Nov 2022 17:24)  POCT Blood Glucose.: 95 mg/dL (01 Nov 2022 12:18)      Anion Gap, Serum: 12 (11-02 @ 06:44)  Anion Gap, Serum: 12 (11-01 @ 11:20)      Calcium, Total Serum: 8.6 (11-02 @ 06:44)  Calcium, Total Serum: 8.1 *L* (11-01 @ 11:20)          11-02    138  |  97  |  37<H>  ----------------------------<  85  4.5   |  29  |  3.32<H>    Ca    8.6      02 Nov 2022 06:44                          10.2   8.30  )-----------( 338      ( 02 Nov 2022 06:39 )             32.8     Medications  MEDICATIONS  (STANDING):  allopurinol 100 milliGRAM(s) Oral daily  aspirin enteric coated 81 milliGRAM(s) Oral daily  atorvastatin 80 milliGRAM(s) Oral at bedtime  carvedilol 25 milliGRAM(s) Oral every 12 hours  clopidogrel Tablet 75 milliGRAM(s) Oral daily  dextrose 5%. 1000 milliLiter(s) (50 mL/Hr) IV Continuous <Continuous>  dextrose 5%. 1000 milliLiter(s) (100 mL/Hr) IV Continuous <Continuous>  dextrose 50% Injectable 25 Gram(s) IV Push once  dextrose 50% Injectable 12.5 Gram(s) IV Push once  dextrose 50% Injectable 25 Gram(s) IV Push once  dextrose Oral Gel 15 Gram(s) Oral once  gabapentin 300 milliGRAM(s) Oral two times a day  glucagon  Injectable 1 milliGRAM(s) IntraMuscular once  heparin   Injectable 5000 Unit(s) SubCutaneous every 12 hours  insulin glargine Injectable (LANTUS) 14 Unit(s) SubCutaneous at bedtime  insulin lispro (ADMELOG) corrective regimen sliding scale   SubCutaneous three times a day before meals  insulin lispro (ADMELOG) corrective regimen sliding scale   SubCutaneous at bedtime  insulin lispro Injectable (ADMELOG) 5 Unit(s) SubCutaneous three times a day before meals  lidocaine/prilocaine Cream 1 Application(s) Topical <User Schedule>  sodium chloride 0.9%. 1000 milliLiter(s) (100 mL/Hr) IV Continuous <Continuous>  tamsulosin 0.4 milliGRAM(s) Oral at bedtime  valsartan 40 milliGRAM(s) Oral two times a day      Physical Exam  General: Patient comfortable in bed  Vital Signs Last 12 Hrs  T(F): 99.7 (11-02-22 @ 05:00), Max: 99.7 (11-02-22 @ 05:00)  HR: 87 (11-02-22 @ 05:00) (87 - 87)  BP: 152/88 (11-02-22 @ 05:00) (152/88 - 160/68)  BP(mean): --  RR: 18 (11-02-22 @ 05:00) (18 - 18)  SpO2: 95% (11-02-22 @ 05:00) (95% - 95%)  Neck: No palpable thyroid nodules.  CVS: S1S2, No murmurs  Respiratory: No wheezing, no crepitations  GI: Abdomen soft, bowel sounds positive  Musculoskeletal:  edema lower extremities.   Skin: No skin rashes, no ecchymosis    Diagnostics             Chief complaint  Patient is a 62y old  Male who presents with a chief complaint of fevers (02 Nov 2022 10:42)   Review of systems  Patient in bed, looks comfortable, had hypoglycemic episode this AM.    Labs and Fingersticks  CAPILLARY BLOOD GLUCOSE      POCT Blood Glucose.: 115 mg/dL (02 Nov 2022 10:31)  POCT Blood Glucose.: 60 mg/dL (02 Nov 2022 10:04)  POCT Blood Glucose.: 58 mg/dL (02 Nov 2022 10:03)  POCT Blood Glucose.: 136 mg/dL (01 Nov 2022 22:28)  POCT Blood Glucose.: 180 mg/dL (01 Nov 2022 17:24)  POCT Blood Glucose.: 95 mg/dL (01 Nov 2022 12:18)      Anion Gap, Serum: 12 (11-02 @ 06:44)  Anion Gap, Serum: 12 (11-01 @ 11:20)      Calcium, Total Serum: 8.6 (11-02 @ 06:44)  Calcium, Total Serum: 8.1 *L* (11-01 @ 11:20)          11-02    138  |  97  |  37<H>  ----------------------------<  85  4.5   |  29  |  3.32<H>    Ca    8.6      02 Nov 2022 06:44                          10.2   8.30  )-----------( 338      ( 02 Nov 2022 06:39 )             32.8     Medications  MEDICATIONS  (STANDING):  allopurinol 100 milliGRAM(s) Oral daily  aspirin enteric coated 81 milliGRAM(s) Oral daily  atorvastatin 80 milliGRAM(s) Oral at bedtime  carvedilol 25 milliGRAM(s) Oral every 12 hours  clopidogrel Tablet 75 milliGRAM(s) Oral daily  dextrose 5%. 1000 milliLiter(s) (50 mL/Hr) IV Continuous <Continuous>  dextrose 5%. 1000 milliLiter(s) (100 mL/Hr) IV Continuous <Continuous>  dextrose 50% Injectable 25 Gram(s) IV Push once  dextrose 50% Injectable 12.5 Gram(s) IV Push once  dextrose 50% Injectable 25 Gram(s) IV Push once  dextrose Oral Gel 15 Gram(s) Oral once  gabapentin 300 milliGRAM(s) Oral two times a day  glucagon  Injectable 1 milliGRAM(s) IntraMuscular once  heparin   Injectable 5000 Unit(s) SubCutaneous every 12 hours  insulin glargine Injectable (LANTUS) 14 Unit(s) SubCutaneous at bedtime  insulin lispro (ADMELOG) corrective regimen sliding scale   SubCutaneous three times a day before meals  insulin lispro (ADMELOG) corrective regimen sliding scale   SubCutaneous at bedtime  insulin lispro Injectable (ADMELOG) 5 Unit(s) SubCutaneous three times a day before meals  lidocaine/prilocaine Cream 1 Application(s) Topical <User Schedule>  sodium chloride 0.9%. 1000 milliLiter(s) (100 mL/Hr) IV Continuous <Continuous>  tamsulosin 0.4 milliGRAM(s) Oral at bedtime  valsartan 40 milliGRAM(s) Oral two times a day      Physical Exam  General: Patient comfortable in bed  Vital Signs Last 12 Hrs  T(F): 99.7 (11-02-22 @ 05:00), Max: 99.7 (11-02-22 @ 05:00)  HR: 87 (11-02-22 @ 05:00) (87 - 87)  BP: 152/88 (11-02-22 @ 05:00) (152/88 - 160/68)  BP(mean): --  RR: 18 (11-02-22 @ 05:00) (18 - 18)  SpO2: 95% (11-02-22 @ 05:00) (95% - 95%)  Neck: No palpable thyroid nodules.  CVS: S1S2, No murmurs  Respiratory: No wheezing, no crepitations  GI: Abdomen soft, bowel sounds positive  Musculoskeletal:  edema lower extremities.   Skin: No skin rashes, no ecchymosis    Diagnostics

## 2022-11-02 NOTE — PROGRESS NOTE ADULT - ASSESSMENT
2 yom   hx of CKD,   s/p DKA, DM.     cardiomyopathy,  CAD w/ stents, HTN, gout     generalized  weakness.  with   c/c h/o b/l leg weakness/ and   b/l arm weakness, r > l      p/w days of generalized weakness and AMS    per wife, pt was just in [facility] for DKA.  and  was d/c'd w/ fever and still weak.     more lethargic and altered especially when he waking up in morning/ glucose,  have been in the 200s            *   pt  admitted  with fevers/ ams. / metabolic  encephalopathy   on  arrival, wbc  was12,000       bcx/ ucx,  was negative    no source of infection found        was on   Zosyn  ,  Ct  a/p,   ? cystitis/   ID eval dr harrington    * CKD,  crt is 5,4/ ,  renal  dr dickerson     *   c/c  anemia     *   DM,  follow  fs        on lantus/  known to  [facility]     *  ef 40     *   c/c  weakness  of   limbs/  neuro d rajeev mccain       has  functional  quadriplegia/  CT  head, . old  arachnoid  cyst/  no intervention      *    acute  gout, right  wrist, on  prednisone/ colchicine     on dvt  ppx     MRI  head/  C  spine.  old  infarcts    much improved, more  alert/  spoke  with wife/     wife  wants  rehab/,  unable  to take care of  pt      echo  from 10/28.  ef  30/   s/p cath, cad.  LAD/ RCA/ pt   had refuse d intervention   plan, PCI, and then   d/ c planning to rehab           rad< from: CT Abdomen and Pelvis No Cont (10.22.22 @ 15:40) >  IMPRESSION:  Mild bladder wall thickening. Correlate with urinalysis  --- End of Report ---  < end of copied text >      rad< from: Transthoracic Echocardiogram (12.07.21 @ 11:24) >  nclusions:  1. Normal mitral valve. Minimal mitral regurgitation.  2. Calcified trileaflet aortic valve with normal opening.  Minimal aortic regurgitation.  3. Mild segmental left ventricular systolic dysfunction.  The basal inferior, basal septum, distal septum, distal  inferior, and the apex are akinetic. Recommend limited  repeat imaging with intravenous echo contrast to better  visualize the apex. (Per sonographer, patient did not agree  to echo contrast during the study.)  4. Mild diastolic dysfunction (Stage I).  5. Normal right ventricular size and function.  *** No previous Echo exam.    < end of copied text >

## 2022-11-02 NOTE — PROGRESS NOTE ADULT - ASSESSMENT
Assessment  DMT2: 62y Male with DM T2 with hyperglycemia admitted with weakness, was on insulin at home, now on basal bolus insulin, decreased dose, blood sugars are trending down, had hypoglycemic episode this morning, eating partial meals, appears comfortable.  CAD: on medications, monitored, asymptomatic.  HTN: On antihypertensive medications, monitored, asymptomatic.  CKD:  labs, chart reviewed.          Rubén Escoto MD  Cell:  [phone]  Office: [phone]    Rachel SHORT  Cell:   Office: [phone]               Assessment  DMT2: 62y Male with DM T2 with hyperglycemia admitted with weakness, was on insulin at home, now on basal bolus insulin, decreased dose, blood sugars are trending down, had hypoglycemic episode this morning, NAD, NPO for cath today.  CAD: on medications, monitored, asymptomatic.  HTN: On antihypertensive medications, monitored, asymptomatic.  CKD:  labs, chart reviewed.          Rubén Escoto MD  Cell:  [phone]  Office: [phone]    Rachel SHORT  Cell:   Office: [phone]               Assessment  DMT2: 62y Male with DM T2 with hyperglycemia admitted with weakness, was on insulin at home, now on basal bolus insulin, decreased dose, blood sugars are trending down, had hypoglycemic episode  this morning, NAD, NPO for cath today.  CAD: on medications, monitored, asymptomatic.  HTN: On antihypertensive medications, monitored, asymptomatic.  CKD:  labs, chart reviewed.          Rubén Escoto MD  Cell:  [phone]  Office: [phone]    Rachel SHORT  Cell:   Office: [phone]

## 2022-11-02 NOTE — PROGRESS NOTE ADULT - SUBJECTIVE AND OBJECTIVE BOX
CARDIOLOGY     PROGRESS  NOTE   ________________________________________________    CHIEF COMPLAINT:Patient is a 62y old  Male who presents with a chief complaint of fevers (01 Nov 2022 10:59)  no complain  	  REVIEW OF SYSTEMS:  CONSTITUTIONAL: No fever, weight loss, or fatigue  EYES: No eye pain, visual disturbances, or discharge  ENT:  No difficulty hearing, tinnitus, vertigo; No sinus or throat pain  NECK: No pain or stiffness  RESPIRATORY: No cough, wheezing, chills or hemoptysis; No Shortness of Breath  CARDIOVASCULAR: No chest pain, palpitations, passing out, dizziness, or leg swelling  GASTROINTESTINAL: No abdominal or epigastric pain. No nausea, vomiting, or hematemesis; No diarrhea or constipation. No melena or hematochezia.  GENITOURINARY: No dysuria, frequency, hematuria, or incontinence  NEUROLOGICAL: No headaches, memory loss, loss of strength, numbness, or tremors  SKIN: No itching, burning, rashes, or lesions   LYMPH Nodes: No enlarged glands  ENDOCRINE: No heat or cold intolerance; No hair loss  MUSCULOSKELETAL: No joint pain or swelling; No muscle, back, or extremity pain  PSYCHIATRIC: No depression, anxiety, mood swings, or difficulty sleeping  HEME/LYMPH: No easy bruising, or bleeding gums  ALLERGY AND IMMUNOLOGIC: No hives or eczema	    [ x] All others negative	  [ ] Unable to obtain    PHYSICAL EXAM:  T(C): 37.6 (11-02-22 @ 05:00), Max: 37.6 (11-02-22 @ 05:00)  HR: 87 (11-02-22 @ 05:00) (78 - 90)  BP: 152/88 (11-02-22 @ 05:00) (123/74 - 165/92)  RR: 18 (11-02-22 @ 05:00) (16 - 18)  SpO2: 95% (11-02-22 @ 05:00) (95% - 100%)  Wt(kg): --  I&O's Summary    01 Nov 2022 07:01  -  02 Nov 2022 07:00  --------------------------------------------------------  IN: 480 mL / OUT: 1300 mL / NET: -820 mL        Appearance: Normal	  HEENT:   Normal oral mucosa, PERRL, EOMI	  Lymphatic: No lymphadenopathy  Cardiovascular: Normal S1 S2, No JVD, + murmurs, No edema  Respiratory: rhonchi  Gastrointestinal:  Soft, Non-tender, + BS	  Skin: No rashes, No ecchymoses, No cyanosis	  Neurologic: Non-focal  Extremities: Normal range of motion,   Vascular: Peripheral pulses palpable 2+ bilaterally    MEDICATIONS  (STANDING):  allopurinol 100 milliGRAM(s) Oral daily  aspirin enteric coated 81 milliGRAM(s) Oral daily  atorvastatin 80 milliGRAM(s) Oral at bedtime  carvedilol 25 milliGRAM(s) Oral every 12 hours  clopidogrel Tablet 75 milliGRAM(s) Oral daily  dextrose 5%. 1000 milliLiter(s) (50 mL/Hr) IV Continuous <Continuous>  dextrose 5%. 1000 milliLiter(s) (100 mL/Hr) IV Continuous <Continuous>  dextrose 50% Injectable 25 Gram(s) IV Push once  dextrose 50% Injectable 12.5 Gram(s) IV Push once  dextrose 50% Injectable 25 Gram(s) IV Push once  dextrose Oral Gel 15 Gram(s) Oral once  gabapentin 300 milliGRAM(s) Oral two times a day  glucagon  Injectable 1 milliGRAM(s) IntraMuscular once  heparin   Injectable 5000 Unit(s) SubCutaneous every 12 hours  insulin glargine Injectable (LANTUS) 18 Unit(s) SubCutaneous at bedtime  insulin lispro (ADMELOG) corrective regimen sliding scale   SubCutaneous three times a day before meals  insulin lispro (ADMELOG) corrective regimen sliding scale   SubCutaneous at bedtime  insulin lispro Injectable (ADMELOG) 6 Unit(s) SubCutaneous three times a day before meals  lidocaine/prilocaine Cream 1 Application(s) Topical <User Schedule>  sodium chloride 0.9%. 1000 milliLiter(s) (100 mL/Hr) IV Continuous <Continuous>  tamsulosin 0.4 milliGRAM(s) Oral at bedtime  valsartan 40 milliGRAM(s) Oral two times a day      TELEMETRY: 	    ECG:  	  RADIOLOGY:  OTHER: 	  	  LABS:	 	    CARDIAC MARKERS:                                10.2   8.30  )-----------( 338      ( 02 Nov 2022 06:39 )             32.8     11-02    138  |  97  |  37<H>  ----------------------------<  85  4.5   |  29  |  3.32<H>    Ca    8.6      02 Nov 2022 06:44      proBNP:   Lipid Profile:   HgA1c:   TSH:         Assessment and plan  ---------------------------  62 yom hx of CKD, DKA, DMtype2, cardiomyopathy w/ stents, HTN, gout p/w days of generalized weakness and AMS. Per wife, pt was just in [facility] for DKA. Was d/c'd w/ fever and still weak. Since tuesday, pt becoming more lethargic and altered especially when he waking up in morning. BG have been in the 200s since per cont BG monitor. Subjective fevers at home, wife feels that his abd is larger than it was before d/c from [facility]. Question of if pt has heather compliant w/ short and long acting insulin as wife works nights and is not there to give all meds at home. Unclear if pt has been falling at home but w/ bruising on his abd.   pt with hx of ashd, chf, cardiomyopathy, dm, s/p CARDIOMEM fu at [facility] with change of mental stratus and sob.  ct scan noted , no active chf  will adjust cardiac meds  will call chf team at [facility] to get Cardiomem readings  beta blocker/ hydralazine and Nitrate  ckd, renal follow up  check pro bnp  dvt prophylaxis  pt with known hx of cad, last stress test  in 07/2021, no ischemia with scar in mary septal and apical area  ct chest abdomen and pelvis noted, no pleural effusion or sign of chf  renal increased renal function last admission CR 4.8  continue asa daily /coreg will add ace as pt on HD  avoid excess fluid, may dc ivf, hx of chf , EF40%  s/p HD yesterday  dc colchicine  MRI noted may consider thoracic MRI  start on ARB while on HD, repeat echo noted with sig lv dysfunction and wall motion abnormality  increase fluid withrawal with increase LV pressure  increase valsartan as tolertaed  cardiac cath noted , spoke to the wife agreed with intervention

## 2022-11-02 NOTE — PROGRESS NOTE ADULT - SUBJECTIVE AND OBJECTIVE BOX
Removal of Right Femoral Sheath    Pulses in the right lower extremity are palpable. The patient was placed in the supine position. The insertion site was identified and the sutures were removed per protocol.  The 6 Hebrew femoral sheath was then removed. Direct pressure was applied for 20 minutes.     Monitoring of the right groin and both lower extremities including neuro-vascular checks and vital signs every 15 minutes x 4, then every 30 minutes x 2, then every 1 hour was ordered.    Complications: None    Comments:

## 2022-11-02 NOTE — PROVIDER CONTACT NOTE (HYPOGLYCEMIA EVENT) - NS PROVIDER CONTACT BACKGROUND-HYPO
Age: 62y    Gender: Male    POCT Blood Glucose:  132 mg/dL (11-02-22 @ 11:22)  115 mg/dL (11-02-22 @ 10:31)  60 mg/dL (11-02-22 @ 10:04)  58 mg/dL (11-02-22 @ 10:03)  136 mg/dL (11-01-22 @ 22:28)  180 mg/dL (11-01-22 @ 17:24)      eMAR:allopurinol   100 milliGRAM(s) Oral (11-02-22 @ 11:17)    atorvastatin   80 milliGRAM(s) Oral (11-01-22 @ 22:29)    dextrose 50% Injectable   12.5 Gram(s) IV Push (11-02-22 @ 10:15)    insulin glargine Injectable (LANTUS)   18 Unit(s) SubCutaneous (11-01-22 @ 22:29)    insulin lispro (ADMELOG) corrective regimen sliding scale   1 Unit(s) SubCutaneous (11-01-22 @ 17:56)    insulin lispro Injectable (ADMELOG)   6 Unit(s) SubCutaneous (11-01-22 @ 17:56)    
Age: 62y    Gender: Male    POCT Blood Glucose:  103 mg/dL (10-31-22 @ 09:05)  63 mg/dL (10-31-22 @ 08:36)  141 mg/dL (10-30-22 @ 21:49)  105 mg/dL (10-30-22 @ 18:25)  135 mg/dL (10-30-22 @ 12:41)      eMAR:allopurinol   100 milliGRAM(s) Oral (10-30-22 @ 13:36)    atorvastatin   80 milliGRAM(s) Oral (10-30-22 @ 21:52)    insulin glargine Injectable (LANTUS)   24 Unit(s) SubCutaneous (10-30-22 @ 21:51)    insulin lispro Injectable (ADMELOG)   8 Unit(s) SubCutaneous (10-30-22 @ 18:28)   8 Unit(s) SubCutaneous (10-30-22 @ 13:36)

## 2022-11-02 NOTE — PROGRESS NOTE ADULT - ASSESSMENT
(1)CKD - stage 5 - diabetic nephropathy  (2)LEYDA - prerenally mediated  (3)Fatigue/weakness - improved   (4)Triple vessel disease       RECOMMEND:  (1) HD in am.  Outpt HD center was already set up   (2)Cardiac stent (was delayed as the cath team needed to get consent and unable to do so)    (3)Dose new meds for GFR <10      DW cards attd    Sayed [facility]   [employer] [facility]   [phone]

## 2022-11-02 NOTE — PROGRESS NOTE ADULT - SUBJECTIVE AND OBJECTIVE BOX
afebrile  REVIEW OF SYSTEMS:  GEN: no fever,    no chills  RESP: no SOB,   no cough  CVS: no chest pain,   no palpitations  GI: no abdominal pain,   no nausea,   no vomiting,   no constipation,   no diarrhea  : no dysuria,   no frequency  NEURO: no headache,   no dizziness  PSYCH: no depression,   not anxious  Derm : no rash    MEDICATIONS  (STANDING):  allopurinol 100 milliGRAM(s) Oral daily  aspirin enteric coated 81 milliGRAM(s) Oral daily  atorvastatin 80 milliGRAM(s) Oral at bedtime  carvedilol 25 milliGRAM(s) Oral every 12 hours  clopidogrel Tablet 75 milliGRAM(s) Oral daily  dextrose 5%. 1000 milliLiter(s) (50 mL/Hr) IV Continuous <Continuous>  dextrose 5%. 1000 milliLiter(s) (100 mL/Hr) IV Continuous <Continuous>  dextrose 50% Injectable 25 Gram(s) IV Push once  dextrose 50% Injectable 12.5 Gram(s) IV Push once  dextrose 50% Injectable 25 Gram(s) IV Push once  dextrose Oral Gel 15 Gram(s) Oral once  gabapentin 300 milliGRAM(s) Oral two times a day  glucagon  Injectable 1 milliGRAM(s) IntraMuscular once  heparin   Injectable 5000 Unit(s) SubCutaneous every 12 hours  insulin glargine Injectable (LANTUS) 18 Unit(s) SubCutaneous at bedtime  insulin lispro (ADMELOG) corrective regimen sliding scale   SubCutaneous three times a day before meals  insulin lispro (ADMELOG) corrective regimen sliding scale   SubCutaneous at bedtime  insulin lispro Injectable (ADMELOG) 6 Unit(s) SubCutaneous three times a day before meals  lidocaine/prilocaine Cream 1 Application(s) Topical <User Schedule>  sodium chloride 0.9%. 1000 milliLiter(s) (100 mL/Hr) IV Continuous <Continuous>  tamsulosin 0.4 milliGRAM(s) Oral at bedtime  valsartan 40 milliGRAM(s) Oral two times a day    MEDICATIONS  (PRN):      Vital Signs Last 24 Hrs  T(C): 37.6 (02 Nov 2022 05:00), Max: 37.6 (02 Nov 2022 05:00)  T(F): 99.7 (02 Nov 2022 05:00), Max: 99.7 (02 Nov 2022 05:00)  HR: 87 (02 Nov 2022 05:00) (78 - 90)  BP: 152/88 (02 Nov 2022 05:00) (123/74 - 165/92)  BP(mean): --  RR: 18 (02 Nov 2022 05:00) (16 - 18)  SpO2: 95% (02 Nov 2022 05:00) (95% - 100%)    Parameters below as of 02 Nov 2022 05:00  Patient On (Oxygen Delivery Method): room air      CAPILLARY BLOOD GLUCOSE      POCT Blood Glucose.: 136 mg/dL (01 Nov 2022 22:28)  POCT Blood Glucose.: 180 mg/dL (01 Nov 2022 17:24)  POCT Blood Glucose.: 95 mg/dL (01 Nov 2022 12:18)    I&O's Summary    01 Nov 2022 07:01  -  02 Nov 2022 07:00  --------------------------------------------------------  IN: 480 mL / OUT: 1300 mL / NET: -820 mL        PHYSICAL EXAM:  HEAD:  Atraumatic, Normocephalic  NECK: Supple, No   JVD  CHEST/LUNG:   no     rales,     no,    rhonchi  HEART: Regular rate and rhythm;         murmur  ABDOMEN: Soft, Nontender, ;   EXTREMITIES:  no      edema  NEUROLOGY:  alert    LABS:                        10.2   8.30  )-----------( 338      ( 02 Nov 2022 06:39 )             32.8     11-02    138  |  97  |  37<H>  ----------------------------<  85  4.5   |  29  |  3.32<H>    Ca    8.6      02 Nov 2022 06:44                              Consultant(s) Notes Reviewed:      Care Discussed with Consultants/Other Providers:

## 2022-11-02 NOTE — PROGRESS NOTE ADULT - SUBJECTIVE AND OBJECTIVE BOX
NEPHROLOGY-[facility] [phone]        Patient seen and examined in bed.  He was the same         MEDICATIONS  (STANDING):  allopurinol 100 milliGRAM(s) Oral daily  aspirin enteric coated 81 milliGRAM(s) Oral daily  atorvastatin 80 milliGRAM(s) Oral at bedtime  carvedilol 25 milliGRAM(s) Oral every 12 hours  clopidogrel Tablet 75 milliGRAM(s) Oral daily  dextrose 5%. 1000 milliLiter(s) (50 mL/Hr) IV Continuous <Continuous>  dextrose 5%. 1000 milliLiter(s) (100 mL/Hr) IV Continuous <Continuous>  dextrose 50% Injectable 25 Gram(s) IV Push once  dextrose 50% Injectable 12.5 Gram(s) IV Push once  dextrose 50% Injectable 25 Gram(s) IV Push once  dextrose Oral Gel 15 Gram(s) Oral once  gabapentin 300 milliGRAM(s) Oral two times a day  glucagon  Injectable 1 milliGRAM(s) IntraMuscular once  heparin   Injectable 5000 Unit(s) SubCutaneous every 12 hours  insulin glargine Injectable (LANTUS) 18 Unit(s) SubCutaneous at bedtime  insulin lispro (ADMELOG) corrective regimen sliding scale   SubCutaneous three times a day before meals  insulin lispro (ADMELOG) corrective regimen sliding scale   SubCutaneous at bedtime  insulin lispro Injectable (ADMELOG) 6 Unit(s) SubCutaneous three times a day before meals  lidocaine/prilocaine Cream 1 Application(s) Topical <User Schedule>  sodium chloride 0.9%. 1000 milliLiter(s) (100 mL/Hr) IV Continuous <Continuous>  tamsulosin 0.4 milliGRAM(s) Oral at bedtime  valsartan 40 milliGRAM(s) Oral two times a day      VITAL:  T(C): , Max: 37.6 (11-02-22 @ 05:00)  T(F): , Max: 99.7 (11-02-22 @ 05:00)  HR: 87 (11-02-22 @ 05:00)  BP: 152/88 (11-02-22 @ 05:00)  BP(mean): --  RR: 18 (11-02-22 @ 05:00)  SpO2: 95% (11-02-22 @ 05:00)  Wt(kg): --    I and O's:    11-01 @ 07:01  -  11-02 @ 07:00  --------------------------------------------------------  IN: 480 mL / OUT: 1300 mL / NET: -820 mL          PHYSICAL EXAM:    Constitutional: NAD  Neck:  No JVD  Respiratory: CTAB/L  Cardiovascular: S1 and S2  Gastrointestinal: BS+, soft, NT/ND  Extremities: No peripheral edema  Neurological:   : No Jay  Skin: No rashes  Access: av    LABS:                        10.2   8.30  )-----------( 338      ( 02 Nov 2022 06:39 )             32.8     11-02    138  |  97  |  37<H>  ----------------------------<  85  4.5   |  29  |  3.32<H>    Ca    8.6      02 Nov 2022 06:44            Urine Studies:          RADIOLOGY & ADDITIONAL STUDIES:

## 2022-11-03 LAB
GLUCOSE BLDC GLUCOMTR-MCNC: 103 MG/DL — HIGH (ref 70–99)
GLUCOSE BLDC GLUCOMTR-MCNC: 169 MG/DL — HIGH (ref 70–99)
GLUCOSE BLDC GLUCOMTR-MCNC: 170 MG/DL — HIGH (ref 70–99)

## 2022-11-03 PROCEDURE — 99232 SBSQ HOSP IP/OBS MODERATE 35: CPT | Mod: 25

## 2022-11-03 PROCEDURE — 99152 MOD SED SAME PHYS/QHP 5/>YRS: CPT

## 2022-11-03 PROCEDURE — 92928 PRQ TCAT PLMT NTRAC ST 1 LES: CPT | Mod: LD

## 2022-11-03 PROCEDURE — 93010 ELECTROCARDIOGRAM REPORT: CPT

## 2022-11-03 RX ORDER — INSULIN GLARGINE 100 [IU]/ML
5 INJECTION, SOLUTION SUBCUTANEOUS AT BEDTIME
Refills: 0 | Status: DISCONTINUED | OUTPATIENT
Start: 2022-11-03 | End: 2022-11-05

## 2022-11-03 RX ORDER — ERYTHROPOIETIN 10000 [IU]/ML
10000 INJECTION, SOLUTION INTRAVENOUS; SUBCUTANEOUS ONCE
Refills: 0 | Status: COMPLETED | OUTPATIENT
Start: 2022-11-03 | End: 2022-11-03

## 2022-11-03 RX ADMIN — TAMSULOSIN HYDROCHLORIDE 0.4 MILLIGRAM(S): 0.4 CAPSULE ORAL at 21:21

## 2022-11-03 RX ADMIN — GABAPENTIN 300 MILLIGRAM(S): 400 CAPSULE ORAL at 05:58

## 2022-11-03 RX ADMIN — GABAPENTIN 300 MILLIGRAM(S): 400 CAPSULE ORAL at 21:21

## 2022-11-03 RX ADMIN — Medication 81 MILLIGRAM(S): at 08:18

## 2022-11-03 RX ADMIN — CARVEDILOL PHOSPHATE 25 MILLIGRAM(S): 80 CAPSULE, EXTENDED RELEASE ORAL at 21:20

## 2022-11-03 RX ADMIN — Medication 1: at 18:22

## 2022-11-03 RX ADMIN — LIDOCAINE AND PRILOCAINE CREAM 1 APPLICATION(S): 25; 25 CREAM TOPICAL at 15:00

## 2022-11-03 RX ADMIN — CARVEDILOL PHOSPHATE 25 MILLIGRAM(S): 80 CAPSULE, EXTENDED RELEASE ORAL at 05:59

## 2022-11-03 RX ADMIN — ERYTHROPOIETIN 10000 UNIT(S): 10000 INJECTION, SOLUTION INTRAVENOUS; SUBCUTANEOUS at 17:47

## 2022-11-03 RX ADMIN — ATORVASTATIN CALCIUM 80 MILLIGRAM(S): 80 TABLET, FILM COATED ORAL at 21:21

## 2022-11-03 RX ADMIN — INSULIN GLARGINE 5 UNIT(S): 100 INJECTION, SOLUTION SUBCUTANEOUS at 22:21

## 2022-11-03 RX ADMIN — VALSARTAN 40 MILLIGRAM(S): 80 TABLET ORAL at 21:20

## 2022-11-03 RX ADMIN — Medication 1: at 13:55

## 2022-11-03 RX ADMIN — CLOPIDOGREL BISULFATE 75 MILLIGRAM(S): 75 TABLET, FILM COATED ORAL at 08:19

## 2022-11-03 RX ADMIN — Medication 100 MILLIGRAM(S): at 21:21

## 2022-11-03 RX ADMIN — VALSARTAN 40 MILLIGRAM(S): 80 TABLET ORAL at 05:59

## 2022-11-03 RX ADMIN — HEPARIN SODIUM 5000 UNIT(S): 5000 INJECTION INTRAVENOUS; SUBCUTANEOUS at 22:21

## 2022-11-03 RX ADMIN — HEPARIN SODIUM 5000 UNIT(S): 5000 INJECTION INTRAVENOUS; SUBCUTANEOUS at 05:59

## 2022-11-03 NOTE — PROGRESS NOTE ADULT - SUBJECTIVE AND OBJECTIVE BOX
afberile    REVIEW OF SYSTEMS:  GEN: no fever,    no chills  RESP: no SOB,   no cough  CVS: no chest pain,   no palpitations  GI: no abdominal pain,   no nausea,   no vomiting,   no constipation,   no diarrhea  : no dysuria,   no frequency  NEURO: no headache,   no dizziness  PSYCH: no depression,   not anxious  Derm : no rash    MEDICATIONS  (STANDING):  allopurinol 100 milliGRAM(s) Oral daily  aspirin enteric coated 81 milliGRAM(s) Oral daily  atorvastatin 80 milliGRAM(s) Oral at bedtime  carvedilol 25 milliGRAM(s) Oral every 12 hours  clopidogrel Tablet 75 milliGRAM(s) Oral daily  dextrose 5%. 1000 milliLiter(s) (50 mL/Hr) IV Continuous <Continuous>  dextrose 5%. 1000 milliLiter(s) (100 mL/Hr) IV Continuous <Continuous>  dextrose 50% Injectable 25 Gram(s) IV Push once  dextrose 50% Injectable 12.5 Gram(s) IV Push once  dextrose 50% Injectable 25 Gram(s) IV Push once  dextrose Oral Gel 15 Gram(s) Oral once  epoetin wayne-epbx (RETACRIT) Injectable 28110 Unit(s) IV Push once  gabapentin 300 milliGRAM(s) Oral two times a day  glucagon  Injectable 1 milliGRAM(s) IntraMuscular once  heparin   Injectable 5000 Unit(s) SubCutaneous every 12 hours  insulin glargine Injectable (LANTUS) 14 Unit(s) SubCutaneous at bedtime  insulin lispro (ADMELOG) corrective regimen sliding scale   SubCutaneous three times a day before meals  insulin lispro (ADMELOG) corrective regimen sliding scale   SubCutaneous at bedtime  insulin lispro Injectable (ADMELOG) 5 Unit(s) SubCutaneous three times a day before meals  lidocaine/prilocaine Cream 1 Application(s) Topical <User Schedule>  tamsulosin 0.4 milliGRAM(s) Oral at bedtime  valsartan 40 milliGRAM(s) Oral two times a day    MEDICATIONS  (PRN):      Vital Signs Last 24 Hrs  T(C): 37 (03 Nov 2022 08:35), Max: 37.3 (02 Nov 2022 10:59)  T(F): 98.6 (03 Nov 2022 08:35), Max: 99.1 (02 Nov 2022 10:59)  HR: 77 (03 Nov 2022 08:35) (68 - 84)  BP: 167/72 (03 Nov 2022 08:35) (111/54 - 167/72)  BP(mean): 85 (02 Nov 2022 21:55) (71 - 93)  RR: 16 (03 Nov 2022 08:35) (16 - 19)  SpO2: 96% (03 Nov 2022 08:35) (94% - 100%)    Parameters below as of 03 Nov 2022 08:35  Patient On (Oxygen Delivery Method): room air      CAPILLARY BLOOD GLUCOSE      POCT Blood Glucose.: 94 mg/dL (02 Nov 2022 16:28)  POCT Blood Glucose.: 132 mg/dL (02 Nov 2022 11:22)  POCT Blood Glucose.: 115 mg/dL (02 Nov 2022 10:31)  POCT Blood Glucose.: 60 mg/dL (02 Nov 2022 10:04)  POCT Blood Glucose.: 58 mg/dL (02 Nov 2022 10:03)    I&O's Summary    02 Nov 2022 07:01  -  03 Nov 2022 07:00  --------------------------------------------------------  IN: 240 mL / OUT: 800 mL / NET: -560 mL        PHYSICAL EXAM:  HEAD:  Atraumatic, Normocephalic  NECK: Supple, No   JVD  CHEST/LUNG:   no     rales,     no,    rhonchi  HEART: Regular rate and rhythm;         murmur  ABDOMEN: Soft, Nontender, ;   EXTREMITIES:     no   edema  NEUROLOGY:  alert    LABS:                        10.2   8.30  )-----------( 338      ( 02 Nov 2022 06:39 )             32.8     11-02    138  |  97  |  37<H>  ----------------------------<  85  4.5   |  29  |  3.32<H>    Ca    8.6      02 Nov 2022 06:44                              Consultant(s) Notes Reviewed:      Care Discussed with Consultants/Other Providers:

## 2022-11-03 NOTE — PROGRESS NOTE ADULT - SUBJECTIVE AND OBJECTIVE BOX
NEPHROLOGY-[facility] [phone]        Patient seen and examined in bed.  He was the same         MEDICATIONS  (STANDING):  allopurinol 100 milliGRAM(s) Oral daily  aspirin enteric coated 81 milliGRAM(s) Oral daily  atorvastatin 80 milliGRAM(s) Oral at bedtime  carvedilol 25 milliGRAM(s) Oral every 12 hours  clopidogrel Tablet 75 milliGRAM(s) Oral daily  dextrose 5%. 1000 milliLiter(s) (100 mL/Hr) IV Continuous <Continuous>  dextrose 5%. 1000 milliLiter(s) (50 mL/Hr) IV Continuous <Continuous>  dextrose 50% Injectable 25 Gram(s) IV Push once  dextrose 50% Injectable 12.5 Gram(s) IV Push once  dextrose 50% Injectable 25 Gram(s) IV Push once  dextrose Oral Gel 15 Gram(s) Oral once  gabapentin 300 milliGRAM(s) Oral two times a day  glucagon  Injectable 1 milliGRAM(s) IntraMuscular once  heparin   Injectable 5000 Unit(s) SubCutaneous every 12 hours  insulin glargine Injectable (LANTUS) 14 Unit(s) SubCutaneous at bedtime  insulin lispro (ADMELOG) corrective regimen sliding scale   SubCutaneous three times a day before meals  insulin lispro (ADMELOG) corrective regimen sliding scale   SubCutaneous at bedtime  insulin lispro Injectable (ADMELOG) 5 Unit(s) SubCutaneous three times a day before meals  lidocaine/prilocaine Cream 1 Application(s) Topical <User Schedule>  tamsulosin 0.4 milliGRAM(s) Oral at bedtime  valsartan 40 milliGRAM(s) Oral two times a day      VITAL:  T(C): , Max: 37.3 (11-02-22 @ 10:59)  T(F): , Max: 99.1 (11-02-22 @ 10:59)  HR: 79 (11-03-22 @ 04:38)  BP: 161/70 (11-03-22 @ 04:38)  BP(mean): 85 (11-02-22 @ 21:55)  RR: 18 (11-03-22 @ 04:38)  SpO2: 96% (11-03-22 @ 04:38)  Wt(kg): --    I and O's:    11-02 @ 07:01  -  11-03 @ 07:00  --------------------------------------------------------  IN: 240 mL / OUT: 800 mL / NET: -560 mL          PHYSICAL EXAM:    Constitutional: NAD  Neck:  No JVD  Respiratory: CTAB/L  Cardiovascular: S1 and S2  Gastrointestinal: BS+, soft, NT/ND  Extremities: No peripheral edema  Neurological: A/O x 3, no focal deficits  Psychiatric: Normal mood, normal affect  : No Jay  Skin: No rashes  Access: [facility]    LABS:                        10.2   8.30  )-----------( 338      ( 02 Nov 2022 06:39 )             32.8     11-02    138  |  97  |  37<H>  ----------------------------<  85  4.5   |  29  |  3.32<H>    Ca    8.6      02 Nov 2022 06:44            Urine Studies:          RADIOLOGY & ADDITIONAL STUDIES:

## 2022-11-03 NOTE — PROGRESS NOTE ADULT - ASSESSMENT
(1)CKD - stage 5 - diabetic nephropathy  (2)LEYDA - prerenally mediated  (3)Fatigue/weakness - improved   (4)Triple vessel disease       RECOMMEND:  (1) HD in am but p the stent.  Outpt HD center was already set up   (2)Cardiac stent #2 today   (3)Dose new meds for GFR <10  (4)If the BP is elevated p HD then increase the hydralazine       DW cards attd    Sayed [facility]   [employer] [facility]   [phone]

## 2022-11-03 NOTE — PROGRESS NOTE ADULT - ASSESSMENT
Assessment  DMT2: 62y Male with DM T2 with hyperglycemia admitted with weakness, was on insulin at home, now on basal bolus insulin, decreased dose yesterday, blood sugars are trending down, no new hypoglycemic episodes today, s/p PCI, for second stent today.  CAD: on medications, monitored, asymptomatic.  HTN: On antihypertensive medications, monitored, asymptomatic.  CKD:  labs, chart reviewed.          Rubén Escoto MD  Cell:  [phone]  Office: [phone]    Rachel SHORT  Cell:   Office: [phone]               Assessment  DMT2: 62y Male with DM T2 with hyperglycemia admitted with weakness, was on insulin at home, now on basal bolus insulin, decreased dose yesterday, blood sugars are  trending down, no new hypoglycemic episodes today, s/p PCI, for second stent today.  CAD: on medications, monitored, asymptomatic.  HTN: On antihypertensive medications, monitored, asymptomatic.  CKD:  labs, chart reviewed.          Rbuén Escoto MD  Cell:  [phone]  Office: [phone]    Rachel SHORT  Cell:   Office: [phone]

## 2022-11-03 NOTE — PROGRESS NOTE ADULT - SUBJECTIVE AND OBJECTIVE BOX
Interventional Cardiology Post Cath Progress Note:                Subjective:   Patient feels well-s/p 11/3/22 Successful TRICIA x 1 to LAD via LFA intact no bleeding or hematoma noted    no current complaints- Denies chest pain, shortness of breath. Denies pain, numbness, tingling or swelling around (groin) access site    Tele 24hrs: NSR 70-80       MEDICATIONS  (STANDING):  allopurinol 100 milliGRAM(s) Oral daily  aspirin enteric coated 81 milliGRAM(s) Oral daily  atorvastatin 80 milliGRAM(s) Oral at bedtime  carvedilol 25 milliGRAM(s) Oral every 12 hours  clopidogrel Tablet 75 milliGRAM(s) Oral daily  dextrose 5%. 1000 milliLiter(s) (100 mL/Hr) IV Continuous <Continuous>  dextrose 5%. 1000 milliLiter(s) (50 mL/Hr) IV Continuous <Continuous>  dextrose 50% Injectable 25 Gram(s) IV Push once  dextrose 50% Injectable 12.5 Gram(s) IV Push once  dextrose 50% Injectable 25 Gram(s) IV Push once  dextrose Oral Gel 15 Gram(s) Oral once  epoetin wayne-epbx (RETACRIT) Injectable 05849 Unit(s) IV Push once  gabapentin 300 milliGRAM(s) Oral two times a day  glucagon  Injectable 1 milliGRAM(s) IntraMuscular once  heparin   Injectable 5000 Unit(s) SubCutaneous every 12 hours  insulin glargine Injectable (LANTUS) 5 Unit(s) SubCutaneous at bedtime  insulin lispro (ADMELOG) corrective regimen sliding scale   SubCutaneous three times a day before meals  insulin lispro (ADMELOG) corrective regimen sliding scale   SubCutaneous at bedtime  lidocaine/prilocaine Cream 1 Application(s) Topical <User Schedule>  tamsulosin 0.4 milliGRAM(s) Oral at bedtime  valsartan 40 milliGRAM(s) Oral two times a day    MEDICATIONS  (PRN):      Objective:  Vital Signs Last 24 Hrs  T(C): 37 (03 Nov 2022 08:35), Max: 37.2 (03 Nov 2022 04:38)  T(F): 98.6 (03 Nov 2022 08:35), Max: 98.9 (03 Nov 2022 04:38)  HR: 73 (03 Nov 2022 13:00) (68 - 84)  BP: 145/- (03 Nov 2022 13:00) (111/54 - 167/72)  BP(mean): 85 (02 Nov 2022 21:55) (71 - 93)  RR: 16 (03 Nov 2022 13:00) (16 - 19)  SpO2: 99% (03 Nov 2022 13:00) (94% - 100%)    Parameters below as of 03 Nov 2022 13:00  Patient On (Oxygen Delivery Method): room air        11-02-22 @ 07:01  -  11-03-22 @ 07:00  --------------------------------------------------------  IN: 240 mL / OUT: 800 mL / NET: -560 mL                              10.2   8.30  )-----------( 338      ( 02 Nov 2022 06:39 )             32.8     11-02    138  |  97  |  37<H>  ----------------------------<  85  4.5   |  29  |  3.32<H>    Ca    8.6      02 Nov 2022 06:44    < from: Cardiac Catheterization (11.03.22 @ 09:31) >  Procedures:               1.    PCI: TRICIA     2.    Ultrasound Guided Access   3.    Arterial Access - Left Femoral     Indications:               Staged Procedures     Conclusions:   Successful PCI of proximal LAD.  DAPT for 6 mths     Procedure Narrative:   The risks and alternatives of the procedures and conscious sedation  were explained to the patient and informed consent was  obtained. The patient was brought to the cath lab and placed on the  exam table.  Access   Left femoral artery:   The puncture site was infiltrated with 1% Lidocaine. Vascular access  was obtained using modified seldinger technique.    Diagnostic Findings:     Coronary Angiography   LAD   Proximal left anterior descending: There is a 90 % stenosis.      Interventional Findings:     Interventional Details   Proximal left anterior descending: The initial stenosis was 90 %.  Guidewire crossing was successful.    A successful Drug Eluting Stent was deployed using a 6FR JL 4.0  LAUNCHER, a HENRY [id number], and a FRONTIER RX  3X8MM.      The inflation pressure was 18 alyssa for the duration of 9.0 seconds.     Following intervention there is a 1 % residual stenosis. There was  TULIO Flow 3 before the procedure and TULIO Flow 3 following the  procedure.      Patient: ADAM KOWALSKI           MRN: [id number]  Study Date: 11/03/2022   09:31 AM     Page 1 of 3    < end of copied text >  < from: TTE with Doppler (w/Cont) (10.28.22 @ 12:56) >  Conclusions:  1. Moderate segmental left ventricular systolic  dysfunction.  Hypokinenesis of the inferior, inferolateral  wall, apex, apial septum, antewrolateral wall.  Endocardial visualization enhanced with intravenous  injection of Ultrasonic Enhancing Agent (Definity). No left  ventricular thrombus.  2. Increased E/e'  is consistent with elevated left  ventricular filling pressure.  3. The right ventricle is not well visualized; grossly  normal right ventricular systolic function.  ------------------------------------------------------------------------  Confirmed on  10/28/2022 - 15:54:16 by PIPER Tenorio  ------------------------------------------------------------------------    < end of copied text >        Physical Exam:  No apparent distress, alert and oriented times three, appropriate affect  JVD is not elevated, supple  Clear to auscultation with no wheezing, ronchi or crackles  Regular rate and rhythm with no murmur, rub or gallop  Positive bowel sounds, soft, non-tender, non-distended, no masses/guarding or rebound tenderness  Procedural cath site noted s/p LFA no bruit noted at groin: Soft, non tender, no bleeding or hematoma, clean/dry/intact- RLE/LLE +2 (palpable femoral pulset)  No clubbing, cyanosis or edema      Assessment/Plan: 62y Male PMH (insert PMH)  s/p (insert vessel/intervention) via RFA/RRA access on (insert date).    - +-Staged procedure.  - Groin site stable.   - Continue DAPT (aspirin 81mg and clopidogrel 75mg)     +-Anticoagulant and if so how long triple therapy.  If aspirin discontiued have to write reason.  - Continue atorvastatin  - EF >35% or <=35%.  If <=35% is patient on an ACE/ARB/ARNI.  If not, why?  ?creatinine  - Care per primary team    Please check [email or web address] password [email or web address] for cardiology service schedule and contact information via TEAMS.                                                                                             Interventional Cardiology Post Cath Progress Note:                Subjective:   Patient feels well-s/p 11/3/22 Successful TRICIA x 1 to LAD via LFA intact no bleeding or hematoma noted    no current complaints- Denies chest pain, shortness of breath. Denies pain, numbness, tingling or swelling around (groin) access site    Tele 24hrs: NSR 70-80       MEDICATIONS  (STANDING):  allopurinol 100 milliGRAM(s) Oral daily  aspirin enteric coated 81 milliGRAM(s) Oral daily  atorvastatin 80 milliGRAM(s) Oral at bedtime  carvedilol 25 milliGRAM(s) Oral every 12 hours  clopidogrel Tablet 75 milliGRAM(s) Oral daily  dextrose 5%. 1000 milliLiter(s) (100 mL/Hr) IV Continuous <Continuous>  dextrose 5%. 1000 milliLiter(s) (50 mL/Hr) IV Continuous <Continuous>  dextrose 50% Injectable 25 Gram(s) IV Push once  dextrose 50% Injectable 12.5 Gram(s) IV Push once  dextrose 50% Injectable 25 Gram(s) IV Push once  dextrose Oral Gel 15 Gram(s) Oral once  epoetin wayne-epbx (RETACRIT) Injectable 03431 Unit(s) IV Push once  gabapentin 300 milliGRAM(s) Oral two times a day  glucagon  Injectable 1 milliGRAM(s) IntraMuscular once  heparin   Injectable 5000 Unit(s) SubCutaneous every 12 hours  insulin glargine Injectable (LANTUS) 5 Unit(s) SubCutaneous at bedtime  insulin lispro (ADMELOG) corrective regimen sliding scale   SubCutaneous three times a day before meals  insulin lispro (ADMELOG) corrective regimen sliding scale   SubCutaneous at bedtime  lidocaine/prilocaine Cream 1 Application(s) Topical <User Schedule>  tamsulosin 0.4 milliGRAM(s) Oral at bedtime  valsartan 40 milliGRAM(s) Oral two times a day    MEDICATIONS  (PRN):      Objective:  Vital Signs Last 24 Hrs  T(C): 37 (03 Nov 2022 08:35), Max: 37.2 (03 Nov 2022 04:38)  T(F): 98.6 (03 Nov 2022 08:35), Max: 98.9 (03 Nov 2022 04:38)  HR: 73 (03 Nov 2022 13:00) (68 - 84)  BP: 145/- (03 Nov 2022 13:00) (111/54 - 167/72)  BP(mean): 85 (02 Nov 2022 21:55) (71 - 93)  RR: 16 (03 Nov 2022 13:00) (16 - 19)  SpO2: 99% (03 Nov 2022 13:00) (94% - 100%)    Parameters below as of 03 Nov 2022 13:00  Patient On (Oxygen Delivery Method): room air        11-02-22 @ 07:01  -  11-03-22 @ 07:00  --------------------------------------------------------  IN: 240 mL / OUT: 800 mL / NET: -560 mL                              10.2   8.30  )-----------( 338      ( 02 Nov 2022 06:39 )             32.8     11-02    138  |  97  |  37<H>  ----------------------------<  85  4.5   |  29  |  3.32<H>    Ca    8.6      02 Nov 2022 06:44    < from: Cardiac Catheterization (11.03.22 @ 09:31) >  Procedures:               1.    PCI: TRICIA     2.    Ultrasound Guided Access   3.    Arterial Access - Left Femoral     Indications:               Staged Procedures     Conclusions:   Successful PCI of proximal LAD.  DAPT for 6 mths     Procedure Narrative:   The risks and alternatives of the procedures and conscious sedation  were explained to the patient and informed consent was  obtained. The patient was brought to the cath lab and placed on the  exam table.  Access   Left femoral artery:   The puncture site was infiltrated with 1% Lidocaine. Vascular access  was obtained using modified seldinger technique.    Diagnostic Findings:     Coronary Angiography   LAD   Proximal left anterior descending: There is a 90 % stenosis.      Interventional Findings:     Interventional Details   Proximal left anterior descending: The initial stenosis was 90 %.  Guidewire crossing was successful.    A successful Drug Eluting Stent was deployed using a 6FR JL 4.0  LAUNCHER, a HENRY [id number], and a FRONTIER RX  3X8MM.      The inflation pressure was 18 alyssa for the duration of 9.0 seconds.     Following intervention there is a 1 % residual stenosis. There was  TULIO Flow 3 before the procedure and TULIO Flow 3 following the  procedure.      Patient: ADAM KOWALSKI           MRN: [id number]  Study Date: 11/03/2022   09:31 AM     Page 1 of 3    < end of copied text >  < from: TTE with Doppler (w/Cont) (10.28.22 @ 12:56) >  Conclusions:  1. Moderate segmental left ventricular systolic  dysfunction.  Hypokinenesis of the inferior, inferolateral  wall, apex, apial septum, antewrolateral wall.  Endocardial visualization enhanced with intravenous  injection of Ultrasonic Enhancing Agent (Definity). No left  ventricular thrombus.  2. Increased E/e'  is consistent with elevated left  ventricular filling pressure.  3. The right ventricle is not well visualized; grossly  normal right ventricular systolic function.  ------------------------------------------------------------------------  Confirmed on  10/28/2022 - 15:54:16 by PIPER Tenorio  ------------------------------------------------------------------------    < end of copied text >        Physical Exam:  No apparent distress, alert and oriented times three, appropriate affect  JVD is not elevated, supple  Clear to auscultation with no wheezing, ronchi or crackles  Regular rate and rhythm with no murmur, rub or gallop  Positive bowel sounds, soft, non-tender, non-distended, no masses/guarding or rebound tenderness  Procedural cath site noted s/p LFA no bruit noted at groin: Soft, non tender, no bleeding or hematoma, clean/dry/intact- RLE/LLE +2 (palpable femoral pulset)  No clubbing, cyanosis or edema      Assessment/Plan: 63 y/o male with PMHX of CAD with stents , Cardiomyopathty ,HTN, HLD, DM s/p DKA , CKD and gout      p/w days of generalized weakness and AMS as per wife, pt was just in [facility] for DKA.  and  was d/c'd w/ fever and still weak.     more lethargic and altered especially when he waking up in morning/ glucose,  have been in the 200s    -- CAD   s/p cath, cad.  LAD/ RCA/ pt   had refuse d intervention  11/2/22  [facility]  TRICIA x 1 to Prox RCA via   11/3 /22 [facility] Staged PCI to LAD TRICIA x 1  via LFA  and then   may  start    d/ c planning  in am, to rehab/    echo  from 10/28.  EF  30%     pt  admitted  with fevers/ ams. / metabolic  encephalopathy   on  arrival, wbc  was12,000       bcx/ ucx,  was negative    no source of infection found        was on   Zosyn  ,  Ct  a/p,   ? cystitis/   ID eval dr harrington    * CKD,  crt is 5,4/ ,  renal  dr dickerson     *   c/c  anemia     *   DM,  follow  fs        on lantus/  known to  [facility]     *  ef 40     *   c/c  weakness  of   limbs/  [facility]       has  functional  quadriplegia/  CT  head, . old  arachnoid  cyst/  no intervention      *    acute  gout, right  wrist, on  prednisone/ colchicine     on dvt  ppx     MRI  head/  C  spine.  old  infarcts    much improved, more  alert/  spoke  with wife/     wife  wants  rehab/,  unable  to take care of  pt     glucos e noted,  stopped  admelog/  follow fs/ pt  is  a  finnicky  eater           62y Male PMH (insert PMH)  s/p (insert vessel/intervention) via RFA/RRA access on (insert date).    - +-Staged procedure.  - Groin site stable.   - Continue DAPT (aspirin 81mg and clopidogrel 75mg)     +-Anticoagulant and if so how long triple therapy.  If aspirin discontiued have to write reason.  - Continue atorvastatin  - EF >35% or <=35%.  If <=35% is patient on an ACE/ARB/ARNI.  If not, why?  ?creatinine  - Care per primary team    Please check [email or web address] password [email or web address] for cardiology service schedule and contact information via TEAMS.                                                                                             Interventional Cardiology Post Cath Progress Note:                Subjective:   Patient feels well seen in cath lab recovery s/p 11/3/22 Successful TRICIA x 1 to LAD via LFA intact no bleeding or hematoma noted    no current complaints- Denies chest pain, shortness of breath. Denies pain, numbness, tingling or swelling around (groin) access site    Tele 24hrs: NSR 70-80       MEDICATIONS  (STANDING):  allopurinol 100 milliGRAM(s) Oral daily  aspirin enteric coated 81 milliGRAM(s) Oral daily  atorvastatin 80 milliGRAM(s) Oral at bedtime  carvedilol 25 milliGRAM(s) Oral every 12 hours  clopidogrel Tablet 75 milliGRAM(s) Oral daily  dextrose 5%. 1000 milliLiter(s) (100 mL/Hr) IV Continuous <Continuous>  dextrose 5%. 1000 milliLiter(s) (50 mL/Hr) IV Continuous <Continuous>  dextrose 50% Injectable 25 Gram(s) IV Push once  dextrose 50% Injectable 12.5 Gram(s) IV Push once  dextrose 50% Injectable 25 Gram(s) IV Push once  dextrose Oral Gel 15 Gram(s) Oral once  epoetin wayne-epbx (RETACRIT) Injectable 00274 Unit(s) IV Push once  gabapentin 300 milliGRAM(s) Oral two times a day  glucagon  Injectable 1 milliGRAM(s) IntraMuscular once  heparin   Injectable 5000 Unit(s) SubCutaneous every 12 hours  insulin glargine Injectable (LANTUS) 5 Unit(s) SubCutaneous at bedtime  insulin lispro (ADMELOG) corrective regimen sliding scale   SubCutaneous three times a day before meals  insulin lispro (ADMELOG) corrective regimen sliding scale   SubCutaneous at bedtime  lidocaine/prilocaine Cream 1 Application(s) Topical <User Schedule>  tamsulosin 0.4 milliGRAM(s) Oral at bedtime  valsartan 40 milliGRAM(s) Oral two times a day    MEDICATIONS  (PRN):      Objective:  Vital Signs Last 24 Hrs  T(C): 37 (03 Nov 2022 08:35), Max: 37.2 (03 Nov 2022 04:38)  T(F): 98.6 (03 Nov 2022 08:35), Max: 98.9 (03 Nov 2022 04:38)  HR: 73 (03 Nov 2022 13:00) (68 - 84)  BP: 145/- (03 Nov 2022 13:00) (111/54 - 167/72)  BP(mean): 85 (02 Nov 2022 21:55) (71 - 93)  RR: 16 (03 Nov 2022 13:00) (16 - 19)  SpO2: 99% (03 Nov 2022 13:00) (94% - 100%)    Parameters below as of 03 Nov 2022 13:00  Patient On (Oxygen Delivery Method): room air        11-02-22 @ 07:01  -  11-03-22 @ 07:00  --------------------------------------------------------  IN: 240 mL / OUT: 800 mL / NET: -560 mL                              10.2   8.30  )-----------( 338      ( 02 Nov 2022 06:39 )             32.8     11-02    138  |  97  |  37<H>  ----------------------------<  85  4.5   |  29  |  3.32<H>    Ca    8.6      02 Nov 2022 06:44    < from: Cardiac Catheterization (11.03.22 @ 09:31) >  Procedures:               1.    PCI: TRICIA     2.    Ultrasound Guided Access   3.    Arterial Access - Left Femoral     Indications:               Staged Procedures     Conclusions:   Successful PCI of proximal LAD.  DAPT for 6 mths     Procedure Narrative:   The risks and alternatives of the procedures and conscious sedation  were explained to the patient and informed consent was  obtained. The patient was brought to the cath lab and placed on the  exam table.  Access   Left femoral artery:   The puncture site was infiltrated with 1% Lidocaine. Vascular access  was obtained using modified seldinger technique.    Diagnostic Findings:     Coronary Angiography   LAD   Proximal left anterior descending: There is a 90 % stenosis.      Interventional Findings:     Interventional Details   Proximal left anterior descending: The initial stenosis was 90 %.  Guidewire crossing was successful.    A successful Drug Eluting Stent was deployed using a 6FR JL 4.0  LAUNCHER, a HENRY [id number], and a FRONTIER RX  3X8MM.      The inflation pressure was 18 alyssa for the duration of 9.0 seconds.     Following intervention there is a 1 % residual stenosis. There was  TULIO Flow 3 before the procedure and TULIO Flow 3 following the  procedure.      Patient: ADAM KOWALSKI           MRN: [id number]  Study Date: 11/03/2022   09:31 AM     Page 1 of 3    < end of copied text >  < from: TTE with Doppler (w/Cont) (10.28.22 @ 12:56) >  Conclusions:  1. Moderate segmental left ventricular systolic  dysfunction.  Hypokinenesis of the inferior, inferolateral  wall, apex, apial septum, antewrolateral wall.  Endocardial visualization enhanced with intravenous  injection of Ultrasonic Enhancing Agent (Definity). No left  ventricular thrombus.  2. Increased E/e'  is consistent with elevated left  ventricular filling pressure.  3. The right ventricle is not well visualized; grossly  normal right ventricular systolic function.  ------------------------------------------------------------------------  Confirmed on  10/28/2022 - 15:54:16 by PIPER Tenorio  ------------------------------------------------------------------------    < end of copied text >  Physical Exam:  No apparent distress, alert and oriented times three, appropriate affect  JVD is not elevated, supple  Clear to auscultation with no wheezing, ronchi or crackles  Regular rate and rhythm with no murmur, rub or gallop  Positive bowel sounds, soft, non-tender, non-distended, no masses/guarding or rebound tenderness  Procedural cath site noted s/p LFA no bruit noted at groin: Soft, non tender, no bleeding or hematoma, clean/dry/intact- RLE/LLE +2 (palpable femoral pulset)  No clubbing, cyanosis or edema      Assessment/Plan: 63 y/o male with PMHX of CAD with stents , Cardiomyopathty ,HTN, HLD, DM s/p DKA , CKD and gout      p/w days of generalized weakness and AMS as per wife, pt was just in [facility] for DKA.  and  was d/c'd w/ fever and still weak.     more lethargic and altered especially when he waking up in morning/ glucose,  have been in the 200s     Continue DAPT (Aspirin 81mg and Clopidogrel 75mg)x 6months   11/2/22  [facility]  TRICIA x 1 to Prox RCA via RFA   11/3 /22 [facility] Staged PCI to LAD TRICIA x 1  via LFA   Groin sites intact no bleeding or hematoma noted   echo  from 10/28.  EF  30% Continue Valsartan , Coreg and Atorvastatin   For HD today     pt  admitted  with fevers/ ams. / metabolic  encephalopathy   on  arrival, wbc  was12,000       bcx/ ucx,  was negative    no source of infection found        was on   Zosyn  ,  Ct  a/p,   ? cystitis/   ID eval dr harrington    * CKD,  crt is 5,4/ ,  renal  dr dickerson     *   c/c  anemia         has  functional  quadriplegia/  CT  head, . old  arachnoid  cyst/  no intervention   Discussed with patient NO NSAIDS due to risk bleeding Avoid Aleve, Ibuprofen, Motrin Excedrin and Naproxen)  May use Tylenol for pain as needed    Care per primary team  Olive Rossi NP  Cardiology   [phone]   Please check [email or web address] password [email or web address] for cardiology service schedule and contact information via TEAMS.

## 2022-11-03 NOTE — PROGRESS NOTE ADULT - SUBJECTIVE AND OBJECTIVE BOX
Chief complaint  Patient is a 62y old  Male who presents with a chief complaint of fevers (03 Nov 2022 09:55)   Review of systems  Last hypoglycemic episode yesterday.    Labs and Fingersticks  CAPILLARY BLOOD GLUCOSE      POCT Blood Glucose.: 94 mg/dL (02 Nov 2022 16:28)      Anion Gap, Serum: 12 (11-02 @ 06:44)      Calcium, Total Serum: 8.6 (11-02 @ 06:44)          11-02    138  |  97  |  37<H>  ----------------------------<  85  4.5   |  29  |  3.32<H>    Ca    8.6      02 Nov 2022 06:44                          10.2   8.30  )-----------( 338      ( 02 Nov 2022 06:39 )             32.8     Medications  MEDICATIONS  (STANDING):  allopurinol 100 milliGRAM(s) Oral daily  aspirin enteric coated 81 milliGRAM(s) Oral daily  atorvastatin 80 milliGRAM(s) Oral at bedtime  carvedilol 25 milliGRAM(s) Oral every 12 hours  clopidogrel Tablet 75 milliGRAM(s) Oral daily  dextrose 5%. 1000 milliLiter(s) (50 mL/Hr) IV Continuous <Continuous>  dextrose 5%. 1000 milliLiter(s) (100 mL/Hr) IV Continuous <Continuous>  dextrose 50% Injectable 25 Gram(s) IV Push once  dextrose 50% Injectable 12.5 Gram(s) IV Push once  dextrose 50% Injectable 25 Gram(s) IV Push once  dextrose Oral Gel 15 Gram(s) Oral once  epoetin wayne-epbx (RETACRIT) Injectable 98167 Unit(s) IV Push once  gabapentin 300 milliGRAM(s) Oral two times a day  glucagon  Injectable 1 milliGRAM(s) IntraMuscular once  heparin   Injectable 5000 Unit(s) SubCutaneous every 12 hours  insulin glargine Injectable (LANTUS) 5 Unit(s) SubCutaneous at bedtime  insulin lispro (ADMELOG) corrective regimen sliding scale   SubCutaneous three times a day before meals  insulin lispro (ADMELOG) corrective regimen sliding scale   SubCutaneous at bedtime  lidocaine/prilocaine Cream 1 Application(s) Topical <User Schedule>  tamsulosin 0.4 milliGRAM(s) Oral at bedtime  valsartan 40 milliGRAM(s) Oral two times a day      Physical Exam  Vital Signs Last 12 Hrs  T(F): 98.6 (11-03-22 @ 08:35), Max: 98.9 (11-03-22 @ 04:38)  HR: 74 (11-03-22 @ 11:30) (72 - 79)  BP: 142/64 (11-03-22 @ 11:30) (124/76 - 167/72)  BP(mean): --  RR: 16 (11-03-22 @ 11:30) (16 - 18)  SpO2: 96% (11-03-22 @ 11:30) (94% - 97%)    Diagnostics    C-Peptide, Serum: Routine (11-03 @ 10:45)           Chief complaint  Patient is a 62y old  Male who presents with a chief complaint of fevers (03 Nov 2022 09:55)   Review of systems  Last hypoglycemic episode yesterday.    Labs and Fingersticks  CAPILLARY BLOOD GLUCOSE      POCT Blood Glucose.: 94 mg/dL (02 Nov 2022 16:28)      Anion Gap, Serum: 12 (11-02 @ 06:44)      Calcium, Total Serum: 8.6 (11-02 @ 06:44)          11-02    138  |  97  |  37<H>  ----------------------------<  85  4.5   |  29  |  3.32<H>    Ca    8.6      02 Nov 2022 06:44                          10.2   8.30  )-----------( 338      ( 02 Nov 2022 06:39 )             32.8     Medications  MEDICATIONS  (STANDING):  allopurinol 100 milliGRAM(s) Oral daily  aspirin enteric coated 81 milliGRAM(s) Oral daily  atorvastatin 80 milliGRAM(s) Oral at bedtime  carvedilol 25 milliGRAM(s) Oral every 12 hours  clopidogrel Tablet 75 milliGRAM(s) Oral daily  dextrose 5%. 1000 milliLiter(s) (50 mL/Hr) IV Continuous <Continuous>  dextrose 5%. 1000 milliLiter(s) (100 mL/Hr) IV Continuous <Continuous>  dextrose 50% Injectable 25 Gram(s) IV Push once  dextrose 50% Injectable 12.5 Gram(s) IV Push once  dextrose 50% Injectable 25 Gram(s) IV Push once  dextrose Oral Gel 15 Gram(s) Oral once  epoetin wayne-epbx (RETACRIT) Injectable 86446 Unit(s) IV Push once  gabapentin 300 milliGRAM(s) Oral two times a day  glucagon  Injectable 1 milliGRAM(s) IntraMuscular once  heparin   Injectable 5000 Unit(s) SubCutaneous every 12 hours  insulin glargine Injectable (LANTUS) 5 Unit(s) SubCutaneous at bedtime  insulin lispro (ADMELOG) corrective regimen sliding scale   SubCutaneous three times a day before meals  insulin lispro (ADMELOG) corrective regimen sliding scale   SubCutaneous at bedtime  lidocaine/prilocaine Cream 1 Application(s) Topical <User Schedule>  tamsulosin 0.4 milliGRAM(s) Oral at bedtime  valsartan 40 milliGRAM(s) Oral two times a day      Physical Exam  Vital Signs Last 12 Hrs  T(F): 98.6 (11-03-22 @ 08:35), Max: 98.9 (11-03-22 @ 04:38)  HR: 74 (11-03-22 @ 11:30) (72 - 79)  BP: 142/64 (11-03-22 @ 11:30) (124/76 - 167/72)  BP(mean): --  RR: 16 (11-03-22 @ 11:30) (16 - 18)  SpO2: 96% (11-03-22 @ 11:30) (94% - 97%)    Diagnostics    C-Peptide, Serum: Routine (11-03 @ 10:45)

## 2022-11-03 NOTE — PROGRESS NOTE ADULT - ASSESSMENT
2 yom   hx of CKD,   s/p DKA, DM.     cardiomyopathy,  CAD w/ stents, HTN, gout     generalized  weakness.  with   c/c h/o b/l leg weakness/ and   b/l arm weakness, r > l      p/w days of generalized weakness and AMS    per wife, pt was just in [facility] for DKA.  and  was d/c'd w/ fever and still weak.     more lethargic and altered especially when he waking up in morning/ glucose,  have been in the 200s            *   pt  admitted  with fevers/ ams. / metabolic  encephalopathy   on  arrival, wbc  was12,000       bcx/ ucx,  was negative    no source of infection found        was on   Zosyn  ,  Ct  a/p,   ? cystitis/   ID eval dr harrington    * CKD,  crt is 5,4/ ,  renal  dr dickerson     *   c/c  anemia     *   DM,  follow  fs        on lantus/  known to  endo ga talley     *  ef 40     *   c/c  weakness  of   limbs/  neuro d rajeev mccain       has  functional  quadriplegia/  CT  head, . old  arachnoid  cyst/  no intervention      *    acute  gout, right  wrist, on  prednisone/ colchicine     on dvt  ppx     MRI  head/  C  spine.  old  infarcts    much improved, more  alert/  spoke  with wife/     wife  wants  rehab/,  unable  to take care of  pt      echo  from 10/28.  ef  30/   s/p cath, cad.  LAD/ RCA/ pt   had refuse d intervention   plan, PCI  today,  and then  starrt   d/ c planning  in am, to rehab           rad< from: CT Abdomen and Pelvis No Cont (10.22.22 @ 15:40) >  IMPRESSION:  Mild bladder wall thickening. Correlate with urinalysis  --- End of Report ---  < end of copied text >      rad< from: Transthoracic Echocardiogram (12.07.21 @ 11:24) >  nclusions:  1. Normal mitral valve. Minimal mitral regurgitation.  2. Calcified trileaflet aortic valve with normal opening.  Minimal aortic regurgitation.  3. Mild segmental left ventricular systolic dysfunction.  The basal inferior, basal septum, distal septum, distal  inferior, and the apex are akinetic. Recommend limited  repeat imaging with intravenous echo contrast to better  visualize the apex. (Per sonographer, patient did not agree  to echo contrast during the study.)  4. Mild diastolic dysfunction (Stage I).  5. Normal right ventricular size and function.  *** No previous Echo exam.    < end of copied text >         2 yom   hx of CKD,   s/p DKA, DM.     cardiomyopathy,  CAD w/ stents, HTN, gout     generalized  weakness.  with   c/c h/o b/l leg weakness/ and   b/l arm weakness, r > l      p/w days of generalized weakness and AMS    per wife, pt was just in [facility] for DKA.  and  was d/c'd w/ fever and still weak.     more lethargic and altered especially when he waking up in morning/ glucose,  have been in the 200s            *   pt  admitted  with fevers/ ams. / metabolic  encephalopathy   on  arrival, wbc  was12,000       bcx/ ucx,  was negative    no source of infection found        was on   Zosyn  ,  Ct  a/p,   ? cystitis/   ID eval dr harrington    * CKD,  crt is 5,4/ ,  renal  dr dickerson     *   c/c  anemia     *   DM,  follow  fs        on lantus/  known to  endo ag talley     *  ef 40     *   c/c  weakness  of   limbs/  neuro d rajeev mccain       has  functional  quadriplegia/  CT  head, . old  arachnoid  cyst/  no intervention      *    acute  gout, right  wrist, on  prednisone/ colchicine     on dvt  ppx     MRI  head/  C  spine.  old  infarcts    much improved, more  alert/  spoke  with wife/     wife  wants  rehab/,  unable  to take care of  pt      echo  from 10/28.  ef  30/   s/p cath, cad.  LAD/ RCA/ pt   had refuse d intervention   plan, PCI on 11/2  and on 11/3   today,  for  second  stent ,   and then   may  start    d/ c planning  in am, to rehab           rad< from: CT Abdomen and Pelvis No Cont (10.22.22 @ 15:40) >  IMPRESSION:  Mild bladder wall thickening. Correlate with urinalysis  --- End of Report ---  < end of copied text >      rad< from: Transthoracic Echocardiogram (12.07.21 @ 11:24) >  nclusions:  1. Normal mitral valve. Minimal mitral regurgitation.  2. Calcified trileaflet aortic valve with normal opening.  Minimal aortic regurgitation.  3. Mild segmental left ventricular systolic dysfunction.  The basal inferior, basal septum, distal septum, distal  inferior, and the apex are akinetic. Recommend limited  repeat imaging with intravenous echo contrast to better  visualize the apex. (Per sonographer, patient did not agree  to echo contrast during the study.)  4. Mild diastolic dysfunction (Stage I).  5. Normal right ventricular size and function.  *** No previous Echo exam.    < end of copied text >         2 yom   hx of CKD,   s/p DKA, DM.     cardiomyopathy,  CAD w/ stents, HTN, gout     generalized  weakness.  with   c/c h/o b/l leg weakness/ and   b/l arm weakness, r > l      p/w days of generalized weakness and AMS    per wife, pt was just in [facility] for DKA.  and  was d/c'd w/ fever and still weak.     more lethargic and altered especially when he waking up in morning/ glucose,  have been in the 200s            *   pt  admitted  with fevers/ ams. / metabolic  encephalopathy   on  arrival, wbc  was12,000       bcx/ ucx,  was negative    no source of infection found        was on   Zosyn  ,  Ct  a/p,   ? cystitis/   ID eval dr harrignton    * CKD,  crt is 5,4/ ,  renal  dr dickerson     *   c/c  anemia     *   DM,  follow  fs        on lantus/  known to  endo ga talley     *  ef 40     *   c/c  weakness  of   limbs/  neuro d rajeev mccain       has  functional  quadriplegia/  CT  head, . old  arachnoid  cyst/  no intervention      *    acute  gout, right  wrist, on  prednisone/ colchicine     on dvt  ppx     MRI  head/  C  spine.  old  infarcts    much improved, more  alert/  spoke  with wife/     wife  wants  rehab/,  unable  to take care of  pt     glucos e noted,  stopped  admelog/  follow fs/ pt  is  a  finnicky  eater     echo  from 10/28.  ef  30/   s/p cath, cad.  LAD/ RCA/ pt   had refuse d intervention   plan, PCI on 11/2  and on 11/3   today,  for  second  stent ,   and then   may  start    d/ c planning  in am, to rehab/             rad< from: CT Abdomen and Pelvis No Cont (10.22.22 @ 15:40) >  IMPRESSION:  Mild bladder wall thickening. Correlate with urinalysis  --- End of Report ---  < end of copied text >      rad< from: Transthoracic Echocardiogram (12.07.21 @ 11:24) >  nclusions:  1. Normal mitral valve. Minimal mitral regurgitation.  2. Calcified trileaflet aortic valve with normal opening.  Minimal aortic regurgitation.  3. Mild segmental left ventricular systolic dysfunction.  The basal inferior, basal septum, distal septum, distal  inferior, and the apex are akinetic. Recommend limited  repeat imaging with intravenous echo contrast to better  visualize the apex. (Per sonographer, patient did not agree  to echo contrast during the study.)  4. Mild diastolic dysfunction (Stage I).  5. Normal right ventricular size and function.  *** No previous Echo exam.    < end of copied text >

## 2022-11-03 NOTE — PROGRESS NOTE ADULT - SUBJECTIVE AND OBJECTIVE BOX
CARDIOLOGY     PROGRESS  NOTE   ________________________________________________    CHIEF COMPLAINT:Patient is a 62y old  Male who presents with a chief complaint of fevers (02 Nov 2022 20:05)  no complain  	  REVIEW OF SYSTEMS:  CONSTITUTIONAL: No fever, weight loss, or fatigue  EYES: No eye pain, visual disturbances, or discharge  ENT:  No difficulty hearing, tinnitus, vertigo; No sinus or throat pain  NECK: No pain or stiffness  RESPIRATORY: No cough, wheezing, chills or hemoptysis; No Shortness of Breath  CARDIOVASCULAR: No chest pain, palpitations, passing out, dizziness, or leg swelling  GASTROINTESTINAL: No abdominal or epigastric pain. No nausea, vomiting, or hematemesis; No diarrhea or constipation. No melena or hematochezia.  GENITOURINARY: No dysuria, frequency, hematuria, or incontinence  NEUROLOGICAL: No headaches, memory loss, loss of strength, numbness, or tremors  SKIN: No itching, burning, rashes, or lesions   LYMPH Nodes: No enlarged glands  ENDOCRINE: No heat or cold intolerance; No hair loss  MUSCULOSKELETAL: No joint pain or swelling; No muscle, back, or extremity pain  PSYCHIATRIC: No depression, anxiety, mood swings, or difficulty sleeping  HEME/LYMPH: No easy bruising, or bleeding gums  ALLERGY AND IMMUNOLOGIC: No hives or eczema	    [ x] All others negative	  [ ] Unable to obtain    PHYSICAL EXAM:  T(C): 37.2 (11-03-22 @ 04:38), Max: 37.3 (11-02-22 @ 10:59)  HR: 79 (11-03-22 @ 04:38) (68 - 84)  BP: 161/70 (11-03-22 @ 04:38) (111/54 - 161/70)  RR: 18 (11-03-22 @ 04:38) (17 - 19)  SpO2: 96% (11-03-22 @ 04:38) (94% - 100%)  Wt(kg): --  I&O's Summary    01 Nov 2022 07:01  -  02 Nov 2022 07:00  --------------------------------------------------------  IN: 480 mL / OUT: 1300 mL / NET: -820 mL    02 Nov 2022 07:01  -  03 Nov 2022 06:59  --------------------------------------------------------  IN: 240 mL / OUT: 800 mL / NET: -560 mL        Appearance: Normal	  HEENT:   Normal oral mucosa, PERRL, EOMI	  Lymphatic: No lymphadenopathy  Cardiovascular: Normal S1 S2, No JVD, + murmurs, No edema  Respiratory: rhonchi  Psychiatry: A & O x 3, Mood & affect appropriate  Gastrointestinal:  Soft, Non-tender, + BS	  Skin: No rashes, No ecchymoses, No cyanosis	  Neurologic: Non-focal  Extremities: Normal range of motion, No clubbing, cyanosis or edema  Vascular: Peripheral pulses palpable 2+ bilaterally    MEDICATIONS  (STANDING):  allopurinol 100 milliGRAM(s) Oral daily  aspirin enteric coated 81 milliGRAM(s) Oral daily  atorvastatin 80 milliGRAM(s) Oral at bedtime  carvedilol 25 milliGRAM(s) Oral every 12 hours  clopidogrel Tablet 75 milliGRAM(s) Oral daily  dextrose 5%. 1000 milliLiter(s) (100 mL/Hr) IV Continuous <Continuous>  dextrose 5%. 1000 milliLiter(s) (50 mL/Hr) IV Continuous <Continuous>  dextrose 50% Injectable 25 Gram(s) IV Push once  dextrose 50% Injectable 12.5 Gram(s) IV Push once  dextrose 50% Injectable 25 Gram(s) IV Push once  dextrose Oral Gel 15 Gram(s) Oral once  gabapentin 300 milliGRAM(s) Oral two times a day  glucagon  Injectable 1 milliGRAM(s) IntraMuscular once  heparin   Injectable 5000 Unit(s) SubCutaneous every 12 hours  insulin glargine Injectable (LANTUS) 14 Unit(s) SubCutaneous at bedtime  insulin lispro (ADMELOG) corrective regimen sliding scale   SubCutaneous three times a day before meals  insulin lispro (ADMELOG) corrective regimen sliding scale   SubCutaneous at bedtime  insulin lispro Injectable (ADMELOG) 5 Unit(s) SubCutaneous three times a day before meals  lidocaine/prilocaine Cream 1 Application(s) Topical <User Schedule>  sodium chloride 0.9%. 1000 milliLiter(s) (100 mL/Hr) IV Continuous <Continuous>  tamsulosin 0.4 milliGRAM(s) Oral at bedtime  valsartan 40 milliGRAM(s) Oral two times a day      TELEMETRY: 	    ECG:  	  RADIOLOGY:  OTHER: 	  	  LABS:	 	    CARDIAC MARKERS:                                10.2   8.30  )-----------( 338      ( 02 Nov 2022 06:39 )             32.8     11-02    138  |  97  |  37<H>  ----------------------------<  85  4.5   |  29  |  3.32<H>    Ca    8.6      02 Nov 2022 06:44      proBNP:   Lipid Profile:   HgA1c:   TSH:     Pulses in the right lower extremity are palpable. The patient was placed in the supine position. The insertion site was identified and the sutures were removed per protocol.  The 6 Kosovan femoral sheath was then removed. Direct pressure was applied for 20 minutes.     Monitoring of the right groin and both lower extremities including neuro-vascular checks and vital signs every 15 minutes x 4, then every 30 minutes x 2, then every 1 hour was ordered.    Assessment and plan  ---------------------------  62 yom hx of CKD, DKA, DMtype2, cardiomyopathy w/ stents, HTN, gout p/w days of generalized weakness and AMS. Per wife, pt was just in [facility] for DKA. Was d/c'd w/ fever and still weak. Since tuesday, pt becoming more lethargic and altered especially when he waking up in morning. BG have been in the 200s since per cont BG monitor. Subjective fevers at home, wife feels that his abd is larger than it was before d/c from [facility]. Question of if pt has heather compliant w/ short and long acting insulin as wife works nights and is not there to give all meds at home. Unclear if pt has been falling at home but w/ bruising on his abd.   pt with hx of ashd, chf, cardiomyopathy, dm, s/p CARDIOMEM fu at [facility] with change of mental stratus and sob.  ct scan noted , no active chf  will adjust cardiac meds  will call chf team at [facility] to get Cardiomem readings  beta blocker/ hydralazine and Nitrate  ckd, renal follow up  check pro bnp  dvt prophylaxis  pt with known hx of cad, last stress test  in 07/2021, no ischemia with scar in mary septal and apical area  ct chest abdomen and pelvis noted, no pleural effusion or sign of chf  renal increased renal function last admission CR 4.8  continue asa daily /coreg will add ace as pt on HD  avoid excess fluid, may dc ivf, hx of chf , EF40%  s/p HD yesterday  dc colchicine  MRI noted may consider thoracic MRI  start on ARB while on HD, repeat echo noted with sig lv dysfunction and wall motion abnormality  increase fluid withrawal with increase LV pressure  increase valsartan as tolertaed  s/p stent yesterday , now with second stent today  increase valsartan for better bp control  HD

## 2022-11-04 LAB
ANION GAP SERPL CALC-SCNC: 11 MMOL/L — SIGNIFICANT CHANGE UP (ref 5–17)
BUN SERPL-MCNC: 27 MG/DL — HIGH (ref 7–23)
C PEPTIDE SERPL-MCNC: 1.5 NG/ML — SIGNIFICANT CHANGE UP (ref 1.1–4.4)
CALCIUM SERPL-MCNC: 8.4 MG/DL — SIGNIFICANT CHANGE UP (ref 8.4–10.5)
CHLORIDE SERPL-SCNC: 100 MMOL/L — SIGNIFICANT CHANGE UP (ref 96–108)
CO2 SERPL-SCNC: 27 MMOL/L — SIGNIFICANT CHANGE UP (ref 22–31)
CREAT SERPL-MCNC: 3.2 MG/DL — HIGH (ref 0.5–1.3)
EGFR: 21 ML/MIN/1.73M2 — LOW
GLUCOSE BLDC GLUCOMTR-MCNC: 143 MG/DL — HIGH (ref 70–99)
GLUCOSE BLDC GLUCOMTR-MCNC: 202 MG/DL — HIGH (ref 70–99)
GLUCOSE BLDC GLUCOMTR-MCNC: 239 MG/DL — HIGH (ref 70–99)
GLUCOSE BLDC GLUCOMTR-MCNC: 260 MG/DL — HIGH (ref 70–99)
GLUCOSE SERPL-MCNC: 149 MG/DL — HIGH (ref 70–99)
HBV SURFACE AB SER-ACNC: SIGNIFICANT CHANGE UP
HCT VFR BLD CALC: 30.7 % — LOW (ref 39–50)
HGB BLD-MCNC: 9.7 G/DL — LOW (ref 13–17)
MCHC RBC-ENTMCNC: 28.4 PG — SIGNIFICANT CHANGE UP (ref 27–34)
MCHC RBC-ENTMCNC: 31.6 GM/DL — LOW (ref 32–36)
MCV RBC AUTO: 90 FL — SIGNIFICANT CHANGE UP (ref 80–100)
NRBC # BLD: 0 /100 WBCS — SIGNIFICANT CHANGE UP (ref 0–0)
PLATELET # BLD AUTO: 250 K/UL — SIGNIFICANT CHANGE UP (ref 150–400)
POTASSIUM SERPL-MCNC: 4.1 MMOL/L — SIGNIFICANT CHANGE UP (ref 3.5–5.3)
POTASSIUM SERPL-SCNC: 4.1 MMOL/L — SIGNIFICANT CHANGE UP (ref 3.5–5.3)
RBC # BLD: 3.41 M/UL — LOW (ref 4.2–5.8)
RBC # FLD: 14.6 % — HIGH (ref 10.3–14.5)
SODIUM SERPL-SCNC: 138 MMOL/L — SIGNIFICANT CHANGE UP (ref 135–145)
WBC # BLD: 7.68 K/UL — SIGNIFICANT CHANGE UP (ref 3.8–10.5)
WBC # FLD AUTO: 7.68 K/UL — SIGNIFICANT CHANGE UP (ref 3.8–10.5)

## 2022-11-04 PROCEDURE — 99232 SBSQ HOSP IP/OBS MODERATE 35: CPT

## 2022-11-04 RX ORDER — ERYTHROPOIETIN 10000 [IU]/ML
10000 INJECTION, SOLUTION INTRAVENOUS; SUBCUTANEOUS ONCE
Refills: 0 | Status: COMPLETED | OUTPATIENT
Start: 2022-11-04 | End: 2022-11-05

## 2022-11-04 RX ADMIN — VALSARTAN 40 MILLIGRAM(S): 80 TABLET ORAL at 17:56

## 2022-11-04 RX ADMIN — Medication 100 MILLIGRAM(S): at 11:47

## 2022-11-04 RX ADMIN — INSULIN GLARGINE 5 UNIT(S): 100 INJECTION, SOLUTION SUBCUTANEOUS at 21:40

## 2022-11-04 RX ADMIN — TAMSULOSIN HYDROCHLORIDE 0.4 MILLIGRAM(S): 0.4 CAPSULE ORAL at 21:39

## 2022-11-04 RX ADMIN — CARVEDILOL PHOSPHATE 25 MILLIGRAM(S): 80 CAPSULE, EXTENDED RELEASE ORAL at 06:13

## 2022-11-04 RX ADMIN — Medication 3: at 12:45

## 2022-11-04 RX ADMIN — HEPARIN SODIUM 5000 UNIT(S): 5000 INJECTION INTRAVENOUS; SUBCUTANEOUS at 17:56

## 2022-11-04 RX ADMIN — VALSARTAN 40 MILLIGRAM(S): 80 TABLET ORAL at 06:13

## 2022-11-04 RX ADMIN — CARVEDILOL PHOSPHATE 25 MILLIGRAM(S): 80 CAPSULE, EXTENDED RELEASE ORAL at 17:56

## 2022-11-04 RX ADMIN — CLOPIDOGREL BISULFATE 75 MILLIGRAM(S): 75 TABLET, FILM COATED ORAL at 11:47

## 2022-11-04 RX ADMIN — HEPARIN SODIUM 5000 UNIT(S): 5000 INJECTION INTRAVENOUS; SUBCUTANEOUS at 06:14

## 2022-11-04 RX ADMIN — Medication 2: at 18:52

## 2022-11-04 RX ADMIN — ATORVASTATIN CALCIUM 80 MILLIGRAM(S): 80 TABLET, FILM COATED ORAL at 21:39

## 2022-11-04 RX ADMIN — Medication 81 MILLIGRAM(S): at 11:47

## 2022-11-04 RX ADMIN — GABAPENTIN 300 MILLIGRAM(S): 400 CAPSULE ORAL at 17:56

## 2022-11-04 RX ADMIN — GABAPENTIN 300 MILLIGRAM(S): 400 CAPSULE ORAL at 06:13

## 2022-11-04 NOTE — PROGRESS NOTE ADULT - SUBJECTIVE AND OBJECTIVE BOX
NEPHROLOGY-[facility] [phone]        Patient seen and examined in bed.  He was in good spirits         MEDICATIONS  (STANDING):  allopurinol 100 milliGRAM(s) Oral daily  aspirin enteric coated 81 milliGRAM(s) Oral daily  atorvastatin 80 milliGRAM(s) Oral at bedtime  carvedilol 25 milliGRAM(s) Oral every 12 hours  clopidogrel Tablet 75 milliGRAM(s) Oral daily  dextrose 5%. 1000 milliLiter(s) (100 mL/Hr) IV Continuous <Continuous>  dextrose 5%. 1000 milliLiter(s) (50 mL/Hr) IV Continuous <Continuous>  dextrose 50% Injectable 25 Gram(s) IV Push once  dextrose 50% Injectable 12.5 Gram(s) IV Push once  dextrose 50% Injectable 25 Gram(s) IV Push once  dextrose Oral Gel 15 Gram(s) Oral once  epoetin wayne-epbx (RETACRIT) Injectable 52595 Unit(s) IV Push once  gabapentin 300 milliGRAM(s) Oral two times a day  glucagon  Injectable 1 milliGRAM(s) IntraMuscular once  heparin   Injectable 5000 Unit(s) SubCutaneous every 12 hours  insulin glargine Injectable (LANTUS) 5 Unit(s) SubCutaneous at bedtime  insulin lispro (ADMELOG) corrective regimen sliding scale   SubCutaneous three times a day before meals  insulin lispro (ADMELOG) corrective regimen sliding scale   SubCutaneous at bedtime  lidocaine/prilocaine Cream 1 Application(s) Topical <User Schedule>  tamsulosin 0.4 milliGRAM(s) Oral at bedtime  valsartan 40 milliGRAM(s) Oral two times a day      VITAL:  T(C): , Max: 37.1 (11-03-22 @ 16:45)  T(F): , Max: 98.8 (11-03-22 @ 16:45)  HR: 73 (11-04-22 @ 04:45)  BP: 144/80 (11-04-22 @ 04:45)  BP(mean): --  RR: 18 (11-04-22 @ 04:45)  SpO2: 98% (11-04-22 @ 04:45)  Wt(kg): --    I and O's:    11-03 @ 07:01  -  11-04 @ 07:00  --------------------------------------------------------  IN: 1040 mL / OUT: 2000 mL / NET: -960 mL          PHYSICAL EXAM:    Constitutional: NAD  Neck:  No JVD  Respiratory: CTAB/L  Cardiovascular: S1 and S2  Gastrointestinal: BS+, soft, NT/ND  Extremities: No peripheral edema  Neurological:  no focal deficits  Psychiatric: Normal mood, normal affect  : No Jay  Skin: No rashes  Access: [facility]    LABS:                        9.7    7.68  )-----------( 250      ( 04 Nov 2022 07:10 )             30.7     11-04    138  |  100  |  27<H>  ----------------------------<  149<H>  4.1   |  27  |  3.20<H>    Ca    8.4      04 Nov 2022 07:03            Urine Studies:          RADIOLOGY & ADDITIONAL STUDIES:

## 2022-11-04 NOTE — PROGRESS NOTE ADULT - ASSESSMENT
62 M with PMH of CKD, DKA, DM2, cardiomyopathy w/ stents, HTN, gout presented with AMS. Started on new HD on this admission. Repeat echo with significant LV dysfunction with WMA. s/p LHC, pRCA TRICIA x 1 via RFA on 11/2, followed by staged PCI to LAD 90% TRICIA x 1 via LFA on 11/3.    # CAD   s/p LHC, pRCA TRICIA x 1 via RFA on 11/2, followed by staged PCI to LAD 90% TRICIA x 1 via LFA on 11/3.   Continue Aspirin, Plavix, Lipitor    Continue with DAPT for 6 months   Close follow up with Cards as outpatient     # HTN   Continue with Coreg and Valsartan - up-titrate as needed   DASH diet     # Uncontrolled DM2    Hgb A1c 11.8 - check Hgb A1c every three months for further close monitoring with PCP/Endocrine   Life style modification encouragement with Consistent Carb diet and exercise   Continue with Insulin and FS monitoring      # ESRD on HD    Continue HD with close follow up with Renal for Volume adjustment      Diet education completed heart healthy diet low fat low sodium low cholesterol   In addition to avoid using NSAIDS  ( Aleve, Motrin, Ibuprofen, naproxen) while on DAPT , please utilize Tylenol for pain control not to exceed 4gm in 24h 62 M with PMH of CKD, DKA, DM2, cardiomyopathy w/ stents, HTN, gout presented with AMS. Started on new HD on this admission. Repeat echo with significant LV dysfunction with WMA. s/p LHC, pRCA TRICIA x 1 via RFA on 11/2, followed by staged PCI to LAD 90% TRICIA x 1 via LFA on 11/3.    # CAD   s/p LHC, pRCA TRICIA x 1 via RFA on 11/2, followed by staged PCI to LAD 90% TRICIA x 1 via LFA on 11/3.   Continue Aspirin, Plavix, Lipitor    Continue with DAPT for 6 months   Close follow up with Cards as outpatient in 2 weeks     # HTN   Continue with Coreg and Valsartan - up-titrate as needed   DASH diet     # Uncontrolled DM2    Hgb A1c 11.8 - check Hgb A1c every three months for further close monitoring with PCP/Endocrine   Life style modification encouragement with Consistent Carb diet and exercise   Continue with Insulin and FS monitoring      # ESRD on HD    Continue HD with close follow up with Renal for Volume adjustment      Diet education completed heart healthy diet low fat low sodium low cholesterol   In addition to avoid using NSAIDS  ( Aleve, Motrin, Ibuprofen, naproxen) while on DAPT , please utilize Tylenol for pain control not to exceed 4gm in 24h

## 2022-11-04 NOTE — PROGRESS NOTE ADULT - ASSESSMENT
(1)CKD - stage 5 - diabetic nephropathy  (2)LEYDA - prerenally mediated  (3)Fatigue/weakness - improved   (4)Triple vessel disease       RECOMMEND:  (1) HD on Sat.  Outpt HD center was already set up   (2)SP Cardiac stent and DAPT for 6 months    (3)Dose new meds for GFR <10     -Increase Valsartan as needed for tighter BP control          [facility]   [facility]   [phone]

## 2022-11-04 NOTE — PROGRESS NOTE ADULT - SUBJECTIVE AND OBJECTIVE BOX
CARDIOLOGY     PROGRESS  NOTE   ________________________________________________    CHIEF COMPLAINT:Patient is a 62y old  Male who presents with a chief complaint of fevers (04 Nov 2022 06:45)  no complain.  	  REVIEW OF SYSTEMS:  CONSTITUTIONAL: No fever, weight loss, or fatigue  EYES: No eye pain, visual disturbances, or discharge  ENT:  No difficulty hearing, tinnitus, vertigo; No sinus or throat pain  NECK: No pain or stiffness  RESPIRATORY: No cough, wheezing, chills or hemoptysis; No Shortness of Breath  CARDIOVASCULAR: No chest pain, palpitations, passing out, dizziness, or leg swelling  GASTROINTESTINAL: No abdominal or epigastric pain. No nausea, vomiting, or hematemesis; No diarrhea or constipation. No melena or hematochezia.  GENITOURINARY: No dysuria, frequency, hematuria, or incontinence  NEUROLOGICAL: No headaches, memory loss, loss of strength, numbness, or tremors  SKIN: No itching, burning, rashes, or lesions   LYMPH Nodes: No enlarged glands  ENDOCRINE: No heat or cold intolerance; No hair loss  MUSCULOSKELETAL: No joint pain or swelling; No muscle, back, or extremity pain  PSYCHIATRIC: No depression, anxiety, mood swings, or difficulty sleeping  HEME/LYMPH: No easy bruising, or bleeding gums  ALLERGY AND IMMUNOLOGIC: No hives or eczema	    [ ] All others negative	  [ x] Unable to obtain    PHYSICAL EXAM:  T(C): 36.6 (11-04-22 @ 04:45), Max: 37.1 (11-03-22 @ 16:45)  HR: 73 (11-04-22 @ 04:45) (72 - 81)  BP: 144/80 (11-04-22 @ 04:45) (124/68 - 167/72)  RR: 18 (11-04-22 @ 04:45) (16 - 18)  SpO2: 98% (11-04-22 @ 04:45) (93% - 100%)  Wt(kg): --  I&O's Summary    03 Nov 2022 07:01  -  04 Nov 2022 07:00  --------------------------------------------------------  IN: 1040 mL / OUT: 2000 mL / NET: -960 mL        Appearance: Normal	  HEENT:   Normal oral mucosa, PERRL, EOMI	  Lymphatic: No lymphadenopathy  Cardiovascular: Normal S1 S2, No JVD, + murmurs, No edema  Respiratory: rhonchi  Gastrointestinal:  Soft, Non-tender, + BS	  Skin: No rashes, No ecchymoses, No cyanosis	  Neurologic: Non-focal  Extremities: Normal range of motion, No clubbing, cyanosis or edema  Vascular: Peripheral pulses palpable 2+ bilaterally    MEDICATIONS  (STANDING):  allopurinol 100 milliGRAM(s) Oral daily  aspirin enteric coated 81 milliGRAM(s) Oral daily  atorvastatin 80 milliGRAM(s) Oral at bedtime  carvedilol 25 milliGRAM(s) Oral every 12 hours  clopidogrel Tablet 75 milliGRAM(s) Oral daily  dextrose 5%. 1000 milliLiter(s) (50 mL/Hr) IV Continuous <Continuous>  dextrose 5%. 1000 milliLiter(s) (100 mL/Hr) IV Continuous <Continuous>  dextrose 50% Injectable 25 Gram(s) IV Push once  dextrose 50% Injectable 12.5 Gram(s) IV Push once  dextrose 50% Injectable 25 Gram(s) IV Push once  dextrose Oral Gel 15 Gram(s) Oral once  gabapentin 300 milliGRAM(s) Oral two times a day  glucagon  Injectable 1 milliGRAM(s) IntraMuscular once  heparin   Injectable 5000 Unit(s) SubCutaneous every 12 hours  insulin glargine Injectable (LANTUS) 5 Unit(s) SubCutaneous at bedtime  insulin lispro (ADMELOG) corrective regimen sliding scale   SubCutaneous three times a day before meals  insulin lispro (ADMELOG) corrective regimen sliding scale   SubCutaneous at bedtime  lidocaine/prilocaine Cream 1 Application(s) Topical <User Schedule>  tamsulosin 0.4 milliGRAM(s) Oral at bedtime  valsartan 40 milliGRAM(s) Oral two times a day      TELEMETRY: 	    ECG:  	  RADIOLOGY:  OTHER: 	  	  LABS:	 	    CARDIAC MARKERS:          < from: Cardiac Catheterization (11.03.22 @ 09:31) >  Proximal left anterior descending: The initial stenosis was 90 %.  Guidewire crossing was successful.  Successful PCI of proximal LAD.  DAPT for 6 mths             proBNP:   Lipid Profile:   HgA1c:   TSH:         Assessment and plan  ---------------------------  62 yom hx of CKD, DKA, DMtype2, cardiomyopathy w/ stents, HTN, gout p/w days of generalized weakness and AMS. Per wife, pt was just in [facility] for DKA. Was d/c'd w/ fever and still weak. Since tuesday, pt becoming more lethargic and altered especially when he waking up in morning. BG have been in the 200s since per cont BG monitor. Subjective fevers at home, wife feels that his abd is larger than it was before d/c from [facility]. Question of if pt has heather compliant w/ short and long acting insulin as wife works nights and is not there to give all meds at home. Unclear if pt has been falling at home but w/ bruising on his abd.   pt with hx of ashd, chf, cardiomyopathy, dm, s/p CARDIOMEM fu at [facility] with change of mental stratus and sob.  ct scan noted , no active chf  will adjust cardiac meds  will call chf team at [facility] to get Cardiomem readings  beta blocker/ hydralazine and Nitrate  ckd, renal follow up  check pro bnp  dvt prophylaxis  pt with known hx of cad, last stress test  in 07/2021, no ischemia with scar in mary septal and apical area  ct chest abdomen and pelvis noted, no pleural effusion or sign of chf  renal increased renal function last admission CR 4.8  continue asa daily /coreg will add ace as pt on HD  avoid excess fluid, may dc ivf, hx of chf , EF40%  s/p HD yesterday  dc colchicine  MRI noted may consider thoracic MRI  start on ARB while on HD, repeat echo noted with sig lv dysfunction and wall motion abnormality  increase fluid withrawal with increase LV pressure  increase valsartan as tolertaed  s/p stent yesterday , now with second stent today px LAD  increase valsartan for better bp control  HD  continue DAPT for 6 months

## 2022-11-04 NOTE — PROGRESS NOTE ADULT - ASSESSMENT
2 yom   hx of CKD,   s/p DKA, DM.     cardiomyopathy,  CAD w/ stents, HTN, gout     generalized  weakness.  with   c/c h/o b/l leg weakness/ and   b/l arm weakness, r > l      p/w days of generalized weakness and AMS    per wife, pt was just in [facility] for DKA.  and  was d/c'd w/ fever and still weak.     more lethargic and altered especially when he waking up in morning/ glucose,  have been in the 200s            *   pt  admitted  with fevers/ ams. / metabolic  encephalopathy   on  arrival, wbc  was12,000       bcx/ ucx,  was negative    no source of infection found        was on   Zosyn  ,  Ct  a/p,   ? cystitis/   ID eval dr harrington    * CKD,  crt is 5,4/ ,  renal  dr dickerson     *   c/c  anemia     *   DM,  follow  fs        on lantus/  known to  [facility]     *  ef 40     *   c/c  weakness  of   limbs/  neuro d rajeev mccain       has  functional  quadriplegia/  CT  head, . old  arachnoid  cyst/  no intervention      *    acute  gout, right  wrist, on  prednisone/ colchicine     on dvt  ppx     MRI  head/  C  spine.  old  infarcts    much improved, more  alert/  spoke  with wife/     wife  wants  rehab/,  unable  to take care of  pt     glucos e noted,  stopped  admelog/  follow fs/ pt  is  a  finnicky  eater     echo  from 10/28.  ef  30/   s/p cath, cad.  LAD/ RCA/ pt   had refuse d intervention     staged  PCI on 11/2  and on 11/3   to  RCA  and  LAD    start    d/ c planning   to rehab/             rad< from: CT Abdomen and Pelvis No Cont (10.22.22 @ 15:40) >  IMPRESSION:  Mild bladder wall thickening. Correlate with urinalysis  --- End of Report ---  < end of copied text >      rad< from: Transthoracic Echocardiogram (12.07.21 @ 11:24) >  nclusions:  1. Normal mitral valve. Minimal mitral regurgitation.  2. Calcified trileaflet aortic valve with normal opening.  Minimal aortic regurgitation.  3. Mild segmental left ventricular systolic dysfunction.  The basal inferior, basal septum, distal septum, distal  inferior, and the apex are akinetic. Recommend limited  repeat imaging with intravenous echo contrast to better  visualize the apex. (Per sonographer, patient did not agree  to echo contrast during the study.)  4. Mild diastolic dysfunction (Stage I).  5. Normal right ventricular size and function.  *** No previous Echo exam.    < end of copied text >

## 2022-11-04 NOTE — PROGRESS NOTE ADULT - SUBJECTIVE AND OBJECTIVE BOX
62 M with PMH of CKD, DKA, DM2, cardiomyopathy w/ stents, HTN, gout presented with AMS. Started on new HD on this admission. Repeat echo with significant LV dysfunction with WMA. s/p LHC, pRCA 80% TRICIA x 1 via RFA on , followed by staged PCI to LAD 90% TRICIA x 1 via LFA on 11/3.    Patient seen and evaluated at bedside. No complaints, had uneventful night.     Allergies    No Known Drug Allergies  Seafood (Unknown)  shellfish (Unknown)    Intolerances        Medications:  allopurinol 100 milliGRAM(s) Oral daily  aspirin enteric coated 81 milliGRAM(s) Oral daily  atorvastatin 80 milliGRAM(s) Oral at bedtime  carvedilol 25 milliGRAM(s) Oral every 12 hours  clopidogrel Tablet 75 milliGRAM(s) Oral daily  dextrose 5%. 1000 milliLiter(s) IV Continuous <Continuous>  dextrose 5%. 1000 milliLiter(s) IV Continuous <Continuous>  dextrose 50% Injectable 25 Gram(s) IV Push once  dextrose 50% Injectable 12.5 Gram(s) IV Push once  dextrose 50% Injectable 25 Gram(s) IV Push once  dextrose Oral Gel 15 Gram(s) Oral once  gabapentin 300 milliGRAM(s) Oral two times a day  glucagon  Injectable 1 milliGRAM(s) IntraMuscular once  heparin   Injectable 5000 Unit(s) SubCutaneous every 12 hours  insulin glargine Injectable (LANTUS) 5 Unit(s) SubCutaneous at bedtime  insulin lispro (ADMELOG) corrective regimen sliding scale   SubCutaneous three times a day before meals  insulin lispro (ADMELOG) corrective regimen sliding scale   SubCutaneous at bedtime  lidocaine/prilocaine Cream 1 Application(s) Topical <User Schedule>  tamsulosin 0.4 milliGRAM(s) Oral at bedtime  valsartan 40 milliGRAM(s) Oral two times a day      Vitals:  T(C): 36.6 (22 @ 04:45), Max: 37.1 (22 @ 16:45)  HR: 73 (22 @ 04:45) (72 - 81)  BP: 144/80 (22 @ 04:45) (124/68 - 167/72)  BP(mean): --  RR: 18 (22 @ 04:45) (16 - 18)  SpO2: 98% (22 @ 04:45) (93% - 100%)  Wt(kg): --  Daily     Daily Weight in k (2022 20:50)  I&O's Summary    2022 07:01  -  2022 07:00  --------------------------------------------------------  IN: 1040 mL / OUT: 2000 mL / NET: -960 mL        Physical Exam:  Appearance: Normal  Eyes: PERRL, EOMI  HENT: Normal oral [place], NC/AT  Cardiovascular: S1S2, RRR, No M/R/G, no JVD, No Lower extremity edema  Procedural Access Site: No hematoma, Non-tender to palpation, 2+ pulse, No bruit, No Ecchymosis  Respiratory: Clear to auscultation bilaterally  Gastrointestinal: Soft, Non tender, Normal Bowel Sounds  Musculoskeletal: No clubbing, No joint deformity   Neurologic: Non-focal  Lymphatic: No lymphadenopathy  Psychiatry: AAOx3, Mood & affect appropriate  Skin: No rashes, No ecchymoses, No cyanosis        138  |  100  |  27<H>  ----------------------------<  149<H>  4.1   |  27  |  3.20<H>    Ca    8.4      2022 07:03      Lipid panel   Hgb A1c 11.8    ECG:    Echo:   EF (Phillips Rule): 39 %Doppler Peak Velocity (m/sec):    1. Moderate segmental left ventricular systolic dysfunction.  Hypokinenesis of the inferior, inferolateral wall, apex, apial septum, antewrolateral wall.  Endocardial visualization enhanced with intravenous injection of Ultrasonic Enhancing Agent (Definity). No left ventricular thrombus.   2. Increased E/e'  is consistent with elevated left ventricular filling pressure.  3. The right ventricle is not well visualized; grossly normal right ventricular systolic function.    Cath:  22  1.    Arterial Access - Right Femoral   2.    Ultrasound Guided Access   3.    PCI: TRICIA   Proximal right coronary artery: The initial stenosis was 80 %. Successful PCI of proximal RCA.      11/3/22  1.    PCI: TRICIA   2.    Ultrasound Guided Access   3.    Arterial Access - Left Femoral   Proximal left anterior descending: The initial stenosis was 90 %. Successful PCI of proximal LAD.  DAPT for 6 mths   Imaging:    Interpretation of Telemetry:   62 M with PMH of CKD, DKA, DM2, cardiomyopathy w/ stents, HTN, gout presented with AMS. Started on new HD on this admission. Repeat echo with significant LV dysfunction with WMA. s/p LHC, pRCA 80% TRICIA x 1 via RFA on , followed by staged PCI to LAD 90% TRICIA x 1 via LFA on 11/3.    Patient seen and evaluated at bedside. No complaints, had uneventful night.     Tele: SR 70-80s with Couplets     Allergies    No Known Drug Allergies  Seafood (Unknown)  shellfish (Unknown)    Medications:  allopurinol 100 milliGRAM(s) Oral daily  aspirin enteric coated 81 milliGRAM(s) Oral daily  atorvastatin 80 milliGRAM(s) Oral at bedtime  carvedilol 25 milliGRAM(s) Oral every 12 hours  clopidogrel Tablet 75 milliGRAM(s) Oral daily  dextrose 5%. 1000 milliLiter(s) IV Continuous <Continuous>  dextrose 5%. 1000 milliLiter(s) IV Continuous <Continuous>  dextrose 50% Injectable 25 Gram(s) IV Push once  dextrose 50% Injectable 12.5 Gram(s) IV Push once  dextrose 50% Injectable 25 Gram(s) IV Push once  dextrose Oral Gel 15 Gram(s) Oral once  gabapentin 300 milliGRAM(s) Oral two times a day  glucagon  Injectable 1 milliGRAM(s) IntraMuscular once  heparin   Injectable 5000 Unit(s) SubCutaneous every 12 hours  insulin glargine Injectable (LANTUS) 5 Unit(s) SubCutaneous at bedtime  insulin lispro (ADMELOG) corrective regimen sliding scale   SubCutaneous three times a day before meals  insulin lispro (ADMELOG) corrective regimen sliding scale   SubCutaneous at bedtime  lidocaine/prilocaine Cream 1 Application(s) Topical <User Schedule>  tamsulosin 0.4 milliGRAM(s) Oral at bedtime  valsartan 40 milliGRAM(s) Oral two times a day      Vitals:  T(C): 36.6 (22 @ 04:45), Max: 37.1 (22 @ 16:45)  HR: 73 (22 @ 04:45) (72 - 81)  BP: 144/80 (22 @ 04:45) (124/68 - 167/72)  BP(mean): --  RR: 18 (22 @ 04:45) (16 - 18)  SpO2: 98% (22 @ 04:45) (93% - 100%)  Wt(kg): --  Daily     Daily Weight in k (2022 20:50)  I&O's Summary    2022 07:01  -  2022 07:00  --------------------------------------------------------  IN: 1040 mL / OUT: 2000 mL / NET: -960 mL      Physical Exam:  Appearance: Normal  Eyes: PERRL, EOMI  HENT: Normal oral mus[place]a, NC/AT  Cardiovascular: S1S2, RRR, No M/R/G, no JVD, No Lower extremity edema  Procedural Access Site: R and L groin site No hematoma, Non-tender to palpation, 2+ pulse, No bruit, No Ecchymosis  Respiratory: Clear to auscultation bilaterally  Gastrointestinal: Soft, Non tender, Normal Bowel Sounds  Musculoskeletal: No clubbing, No joint deformity   Neurologic: Non-focal  Lymphatic: No lymphadenopathy  Psychiatry: AAOx3, Mood & affect appropriate  Skin: No rashes, No ecchymoses, No cyanosis        138  |  100  |  27<H>  ----------------------------<  149<H>  4.1   |  27  |  3.20<H>    Ca    8.4      2022 07:03      Lipid panel   Hgb A1c 11.8    ECG:    Echo:   EF (Phillips Rule): 39 %Doppler Peak Velocity (m/sec):    1. Moderate segmental left ventricular systolic dysfunction.  Hypokinenesis of the inferior, inferolateral wall, apex, apial septum, antewrolateral wall.  Endocardial visualization enhanced with intravenous injection of Ultrasonic Enhancing Agent (Definity). No left ventricular thrombus.   2. Increased E/e'  is consistent with elevated left ventricular filling pressure.  3. The right ventricle is not well visualized; grossly normal right ventricular systolic function.    Cath:  22  1.    Arterial Access - Right Femoral   2.    Ultrasound Guided Access   3.    PCI: TRICIA   Proximal right coronary artery: The initial stenosis was 80 %. Successful PCI of proximal RCA.      11/3/22  1.    PCI: TRICIA   2.    Ultrasound Guided Access   3.    Arterial Access - Left Femoral   Proximal left anterior descending: The initial stenosis was 90 %. Successful PCI of proximal LAD.  DAPT for 6 mths   Imaging:

## 2022-11-04 NOTE — PROGRESS NOTE ADULT - SUBJECTIVE AND OBJECTIVE BOX
Chief complaint  Patient is a 62y old  Male who presents with a chief complaint of fevers (04 Nov 2022 09:47)   Review of systems  Patient in bed, looks comfortable, no hypoglycemic episodes.    Labs and Fingersticks  CAPILLARY BLOOD GLUCOSE      POCT Blood Glucose.: 143 mg/dL (04 Nov 2022 08:44)  POCT Blood Glucose.: 103 mg/dL (03 Nov 2022 21:53)  POCT Blood Glucose.: 169 mg/dL (03 Nov 2022 18:12)  POCT Blood Glucose.: 170 mg/dL (03 Nov 2022 13:46)      Anion Gap, Serum: 11 (11-04 @ 07:03)      Calcium, Total Serum: 8.4 (11-04 @ 07:03)          11-04    138  |  100  |  27<H>  ----------------------------<  149<H>  4.1   |  27  |  3.20<H>    Ca    8.4      04 Nov 2022 07:03                          9.7    7.68  )-----------( 250      ( 04 Nov 2022 07:10 )             30.7     Medications  MEDICATIONS  (STANDING):  allopurinol 100 milliGRAM(s) Oral daily  aspirin enteric coated 81 milliGRAM(s) Oral daily  atorvastatin 80 milliGRAM(s) Oral at bedtime  carvedilol 25 milliGRAM(s) Oral every 12 hours  clopidogrel Tablet 75 milliGRAM(s) Oral daily  dextrose 5%. 1000 milliLiter(s) (100 mL/Hr) IV Continuous <Continuous>  dextrose 5%. 1000 milliLiter(s) (50 mL/Hr) IV Continuous <Continuous>  dextrose 50% Injectable 25 Gram(s) IV Push once  dextrose 50% Injectable 12.5 Gram(s) IV Push once  dextrose 50% Injectable 25 Gram(s) IV Push once  dextrose Oral Gel 15 Gram(s) Oral once  epoetin wayne-epbx (RETACRIT) Injectable 83067 Unit(s) IV Push once  gabapentin 300 milliGRAM(s) Oral two times a day  glucagon  Injectable 1 milliGRAM(s) IntraMuscular once  heparin   Injectable 5000 Unit(s) SubCutaneous every 12 hours  insulin glargine Injectable (LANTUS) 5 Unit(s) SubCutaneous at bedtime  insulin lispro (ADMELOG) corrective regimen sliding scale   SubCutaneous three times a day before meals  insulin lispro (ADMELOG) corrective regimen sliding scale   SubCutaneous at bedtime  lidocaine/prilocaine Cream 1 Application(s) Topical <User Schedule>  tamsulosin 0.4 milliGRAM(s) Oral at bedtime  valsartan 40 milliGRAM(s) Oral two times a day      Physical Exam  General: Patient comfortable in bed  Vital Signs Last 12 Hrs  T(F): 97.9 (11-04-22 @ 04:45), Max: 97.9 (11-04-22 @ 04:45)  HR: 73 (11-04-22 @ 04:45) (73 - 73)  BP: 144/80 (11-04-22 @ 04:45) (144/80 - 144/80)  BP(mean): --  RR: 18 (11-04-22 @ 04:45) (18 - 18)  SpO2: 98% (11-04-22 @ 04:45) (98% - 98%)  Neck: No palpable thyroid nodules.  CVS: S1S2, No murmurs  Respiratory: No wheezing, no crepitations  GI: Abdomen soft, bowel sounds positive  Musculoskeletal:  edema lower extremities.   Skin: No skin rashes, no ecchymosis    Diagnostics    C-Peptide, Serum: Routine (11-03 @ 10:45)           Chief complaint  Patient is a 62y old  Male who presents with a chief complaint of fevers (04 Nov 2022 09:47)   Review of systems  Patient in bed, looks comfortable, no hypoglycemic episodes.    Labs and Fingersticks  CAPILLARY BLOOD GLUCOSE      POCT Blood Glucose.: 143 mg/dL (04 Nov 2022 08:44)  POCT Blood Glucose.: 103 mg/dL (03 Nov 2022 21:53)  POCT Blood Glucose.: 169 mg/dL (03 Nov 2022 18:12)  POCT Blood Glucose.: 170 mg/dL (03 Nov 2022 13:46)      Anion Gap, Serum: 11 (11-04 @ 07:03)      Calcium, Total Serum: 8.4 (11-04 @ 07:03)          11-04    138  |  100  |  27<H>  ----------------------------<  149<H>  4.1   |  27  |  3.20<H>    Ca    8.4      04 Nov 2022 07:03                          9.7    7.68  )-----------( 250      ( 04 Nov 2022 07:10 )             30.7     Medications  MEDICATIONS  (STANDING):  allopurinol 100 milliGRAM(s) Oral daily  aspirin enteric coated 81 milliGRAM(s) Oral daily  atorvastatin 80 milliGRAM(s) Oral at bedtime  carvedilol 25 milliGRAM(s) Oral every 12 hours  clopidogrel Tablet 75 milliGRAM(s) Oral daily  dextrose 5%. 1000 milliLiter(s) (100 mL/Hr) IV Continuous <Continuous>  dextrose 5%. 1000 milliLiter(s) (50 mL/Hr) IV Continuous <Continuous>  dextrose 50% Injectable 25 Gram(s) IV Push once  dextrose 50% Injectable 12.5 Gram(s) IV Push once  dextrose 50% Injectable 25 Gram(s) IV Push once  dextrose Oral Gel 15 Gram(s) Oral once  epoetin wayne-epbx (RETACRIT) Injectable 37191 Unit(s) IV Push once  gabapentin 300 milliGRAM(s) Oral two times a day  glucagon  Injectable 1 milliGRAM(s) IntraMuscular once  heparin   Injectable 5000 Unit(s) SubCutaneous every 12 hours  insulin glargine Injectable (LANTUS) 5 Unit(s) SubCutaneous at bedtime  insulin lispro (ADMELOG) corrective regimen sliding scale   SubCutaneous three times a day before meals  insulin lispro (ADMELOG) corrective regimen sliding scale   SubCutaneous at bedtime  lidocaine/prilocaine Cream 1 Application(s) Topical <User Schedule>  tamsulosin 0.4 milliGRAM(s) Oral at bedtime  valsartan 40 milliGRAM(s) Oral two times a day      Physical Exam  General: Patient comfortable in bed  Vital Signs Last 12 Hrs  T(F): 97.9 (11-04-22 @ 04:45), Max: 97.9 (11-04-22 @ 04:45)  HR: 73 (11-04-22 @ 04:45) (73 - 73)  BP: 144/80 (11-04-22 @ 04:45) (144/80 - 144/80)  BP(mean): --  RR: 18 (11-04-22 @ 04:45) (18 - 18)  SpO2: 98% (11-04-22 @ 04:45) (98% - 98%)  Neck: No palpable thyroid nodules.  CVS: S1S2, No murmurs  Respiratory: No wheezing, no crepitations  GI: Abdomen soft, bowel sounds positive  Musculoskeletal:  edema lower extremities.   Skin: No skin rashes, no ecchymosis    Diagnostics    C-Peptide, Serum: Routine (11-03 @ 10:45)

## 2022-11-04 NOTE — PROGRESS NOTE ADULT - SUBJECTIVE AND OBJECTIVE BOX
afberile    REVIEW OF SYSTEMS:  GEN: no fever,    no chills  RESP: no SOB,   no cough  CVS: no chest pain,   no palpitations  GI: no abdominal pain,   no nausea,   no vomiting,   no constipation,   no diarrhea  : no dysuria,   no frequency  NEURO: no headache,   no dizziness  PSYCH: no depression,   not anxious  Derm : no rash    MEDICATIONS  (STANDING):  allopurinol 100 milliGRAM(s) Oral daily  aspirin enteric coated 81 milliGRAM(s) Oral daily  atorvastatin 80 milliGRAM(s) Oral at bedtime  carvedilol 25 milliGRAM(s) Oral every 12 hours  clopidogrel Tablet 75 milliGRAM(s) Oral daily  dextrose 5%. 1000 milliLiter(s) (100 mL/Hr) IV Continuous <Continuous>  dextrose 5%. 1000 milliLiter(s) (50 mL/Hr) IV Continuous <Continuous>  dextrose 50% Injectable 25 Gram(s) IV Push once  dextrose 50% Injectable 12.5 Gram(s) IV Push once  dextrose 50% Injectable 25 Gram(s) IV Push once  dextrose Oral Gel 15 Gram(s) Oral once  gabapentin 300 milliGRAM(s) Oral two times a day  glucagon  Injectable 1 milliGRAM(s) IntraMuscular once  heparin   Injectable 5000 Unit(s) SubCutaneous every 12 hours  insulin glargine Injectable (LANTUS) 5 Unit(s) SubCutaneous at bedtime  insulin lispro (ADMELOG) corrective regimen sliding scale   SubCutaneous three times a day before meals  insulin lispro (ADMELOG) corrective regimen sliding scale   SubCutaneous at bedtime  lidocaine/prilocaine Cream 1 Application(s) Topical <User Schedule>  tamsulosin 0.4 milliGRAM(s) Oral at bedtime  valsartan 40 milliGRAM(s) Oral two times a day    MEDICATIONS  (PRN):      Vital Signs Last 24 Hrs  T(C): 36.6 (04 Nov 2022 04:45), Max: 37.1 (03 Nov 2022 16:45)  T(F): 97.9 (04 Nov 2022 04:45), Max: 98.8 (03 Nov 2022 16:45)  HR: 73 (04 Nov 2022 04:45) (72 - 81)  BP: 144/80 (04 Nov 2022 04:45) (124/68 - 167/72)  BP(mean): --  RR: 18 (04 Nov 2022 04:45) (16 - 18)  SpO2: 98% (04 Nov 2022 04:45) (93% - 100%)    Parameters below as of 04 Nov 2022 04:45  Patient On (Oxygen Delivery Method): room air      CAPILLARY BLOOD GLUCOSE      POCT Blood Glucose.: 103 mg/dL (03 Nov 2022 21:53)  POCT Blood Glucose.: 169 mg/dL (03 Nov 2022 18:12)  POCT Blood Glucose.: 170 mg/dL (03 Nov 2022 13:46)    I&O's Summary    02 Nov 2022 07:01  -  03 Nov 2022 07:00  --------------------------------------------------------  IN: 240 mL / OUT: 800 mL / NET: -560 mL    03 Nov 2022 07:01  -  04 Nov 2022 06:45  --------------------------------------------------------  IN: 1040 mL / OUT: 2000 mL / NET: -960 mL        PHYSICAL EXAM:  HEAD:  Atraumatic, Normocephalic  NECK: Supple, No   JVD  CHEST/LUNG:   no     rales,     no,    rhonchi  HEART: Regular rate and rhythm;         murmur  ABDOMEN: Soft, Nontender, ;   EXTREMITIES:    no    edema  NEUROLOGY:  alert    LABS:                                  Consultant(s) Notes Reviewed:      Care Discussed with Consultants/Other Providers:

## 2022-11-04 NOTE — PROGRESS NOTE ADULT - ASSESSMENT
Assessment  DMT2: 62y Male with DM T2 with hyperglycemia admitted with weakness, was on insulin at home, now on basal insulin and coverage, decreased dose s/p hypoglycemias, blood sugars are improving and in overall acceptable range now, no new hypoglycemic episodes today, s/p PCI, eats with inconsistent intake, appears comfortable in NAD.  CAD: on medications, monitored, asymptomatic.  HTN: On antihypertensive medications, monitored, asymptomatic.  CKD:  labs, chart reviewed.          Rubén Escoto MD  Cell:  [phone]  Office:     Rachel SHORT  Cell:   Office:                Assessment  DMT2: 62y Male with DM T2 with hyperglycemia admitted with weakness, was on insulin at home, now on basal insulin and coverage, decreased dose s/p hypoglycemias,  blood sugars are improving and in overall acceptable range now, no new hypoglycemic episodes today, s/p PCI, eats with inconsistent intake, appears comfortable in NAD.  CAD: on medications, monitored, asymptomatic.  HTN: On antihypertensive medications, monitored, asymptomatic.  CKD:  labs, chart reviewed.          Rubén Escoto MD  Cell:  [phone]  Office:     Rachel SHORT  Cell:   Office:

## 2022-11-05 LAB
ANION GAP SERPL CALC-SCNC: 12 MMOL/L — SIGNIFICANT CHANGE UP (ref 5–17)
BUN SERPL-MCNC: 35 MG/DL — HIGH (ref 7–23)
CALCIUM SERPL-MCNC: 8.1 MG/DL — LOW (ref 8.4–10.5)
CHLORIDE SERPL-SCNC: 98 MMOL/L — SIGNIFICANT CHANGE UP (ref 96–108)
CO2 SERPL-SCNC: 26 MMOL/L — SIGNIFICANT CHANGE UP (ref 22–31)
CREAT SERPL-MCNC: 4.02 MG/DL — HIGH (ref 0.5–1.3)
EGFR: 16 ML/MIN/1.73M2 — LOW
GLUCOSE BLDC GLUCOMTR-MCNC: 148 MG/DL — HIGH (ref 70–99)
GLUCOSE BLDC GLUCOMTR-MCNC: 159 MG/DL — HIGH (ref 70–99)
GLUCOSE BLDC GLUCOMTR-MCNC: 219 MG/DL — HIGH (ref 70–99)
GLUCOSE BLDC GLUCOMTR-MCNC: 232 MG/DL — HIGH (ref 70–99)
GLUCOSE SERPL-MCNC: 280 MG/DL — HIGH (ref 70–99)
POTASSIUM SERPL-MCNC: 4.6 MMOL/L — SIGNIFICANT CHANGE UP (ref 3.5–5.3)
POTASSIUM SERPL-SCNC: 4.6 MMOL/L — SIGNIFICANT CHANGE UP (ref 3.5–5.3)
SODIUM SERPL-SCNC: 136 MMOL/L — SIGNIFICANT CHANGE UP (ref 135–145)

## 2022-11-05 RX ORDER — INSULIN LISPRO 100/ML
3 VIAL (ML) SUBCUTANEOUS
Refills: 0 | Status: DISCONTINUED | OUTPATIENT
Start: 2022-11-05 | End: 2022-11-10

## 2022-11-05 RX ORDER — VALSARTAN 80 MG/1
40 TABLET ORAL ONCE
Refills: 0 | Status: COMPLETED | OUTPATIENT
Start: 2022-11-05 | End: 2022-11-05

## 2022-11-05 RX ORDER — VALSARTAN 80 MG/1
80 TABLET ORAL DAILY
Refills: 0 | Status: DISCONTINUED | OUTPATIENT
Start: 2022-11-05 | End: 2022-11-09

## 2022-11-05 RX ORDER — INSULIN GLARGINE 100 [IU]/ML
7 INJECTION, SOLUTION SUBCUTANEOUS AT BEDTIME
Refills: 0 | Status: DISCONTINUED | OUTPATIENT
Start: 2022-11-05 | End: 2022-11-10

## 2022-11-05 RX ADMIN — VALSARTAN 40 MILLIGRAM(S): 80 TABLET ORAL at 17:30

## 2022-11-05 RX ADMIN — GABAPENTIN 300 MILLIGRAM(S): 400 CAPSULE ORAL at 05:40

## 2022-11-05 RX ADMIN — Medication 100 MILLIGRAM(S): at 11:34

## 2022-11-05 RX ADMIN — INSULIN GLARGINE 7 UNIT(S): 100 INJECTION, SOLUTION SUBCUTANEOUS at 21:47

## 2022-11-05 RX ADMIN — Medication 3 UNIT(S): at 17:34

## 2022-11-05 RX ADMIN — CLOPIDOGREL BISULFATE 75 MILLIGRAM(S): 75 TABLET, FILM COATED ORAL at 11:35

## 2022-11-05 RX ADMIN — CARVEDILOL PHOSPHATE 25 MILLIGRAM(S): 80 CAPSULE, EXTENDED RELEASE ORAL at 17:30

## 2022-11-05 RX ADMIN — ERYTHROPOIETIN 10000 UNIT(S): 10000 INJECTION, SOLUTION INTRAVENOUS; SUBCUTANEOUS at 15:41

## 2022-11-05 RX ADMIN — Medication 2: at 13:20

## 2022-11-05 RX ADMIN — ATORVASTATIN CALCIUM 80 MILLIGRAM(S): 80 TABLET, FILM COATED ORAL at 21:47

## 2022-11-05 RX ADMIN — LIDOCAINE AND PRILOCAINE CREAM 1 APPLICATION(S): 25; 25 CREAM TOPICAL at 13:00

## 2022-11-05 RX ADMIN — VALSARTAN 40 MILLIGRAM(S): 80 TABLET ORAL at 05:40

## 2022-11-05 RX ADMIN — GABAPENTIN 300 MILLIGRAM(S): 400 CAPSULE ORAL at 17:30

## 2022-11-05 RX ADMIN — CARVEDILOL PHOSPHATE 25 MILLIGRAM(S): 80 CAPSULE, EXTENDED RELEASE ORAL at 05:40

## 2022-11-05 RX ADMIN — TAMSULOSIN HYDROCHLORIDE 0.4 MILLIGRAM(S): 0.4 CAPSULE ORAL at 21:47

## 2022-11-05 RX ADMIN — Medication 81 MILLIGRAM(S): at 11:34

## 2022-11-05 RX ADMIN — Medication 1: at 17:33

## 2022-11-05 RX ADMIN — Medication 2: at 08:42

## 2022-11-05 RX ADMIN — HEPARIN SODIUM 5000 UNIT(S): 5000 INJECTION INTRAVENOUS; SUBCUTANEOUS at 05:41

## 2022-11-05 RX ADMIN — HEPARIN SODIUM 5000 UNIT(S): 5000 INJECTION INTRAVENOUS; SUBCUTANEOUS at 17:31

## 2022-11-05 NOTE — PROGRESS NOTE ADULT - SUBJECTIVE AND OBJECTIVE BOX
NEPHROLOGY     Patient seen and examined resting comfortably, no complaints, not sob,     MEDICATIONS  (STANDING):  allopurinol 100 milliGRAM(s) Oral daily  aspirin enteric coated 81 milliGRAM(s) Oral daily  atorvastatin 80 milliGRAM(s) Oral at bedtime  carvedilol 25 milliGRAM(s) Oral every 12 hours  clopidogrel Tablet 75 milliGRAM(s) Oral daily  dextrose 5%. 1000 milliLiter(s) (100 mL/Hr) IV Continuous <Continuous>  dextrose 5%. 1000 milliLiter(s) (50 mL/Hr) IV Continuous <Continuous>  dextrose 50% Injectable 25 Gram(s) IV Push once  dextrose 50% Injectable 12.5 Gram(s) IV Push once  dextrose 50% Injectable 25 Gram(s) IV Push once  dextrose Oral Gel 15 Gram(s) Oral once  epoetin wayne-epbx (RETACRIT) Injectable 83172 Unit(s) IV Push once  gabapentin 300 milliGRAM(s) Oral two times a day  glucagon  Injectable 1 milliGRAM(s) IntraMuscular once  heparin   Injectable 5000 Unit(s) SubCutaneous every 12 hours  insulin glargine Injectable (LANTUS) 5 Unit(s) SubCutaneous at bedtime  insulin lispro (ADMELOG) corrective regimen sliding scale   SubCutaneous three times a day before meals  insulin lispro (ADMELOG) corrective regimen sliding scale   SubCutaneous at bedtime  lidocaine/prilocaine Cream 1 Application(s) Topical <User Schedule>  tamsulosin 0.4 milliGRAM(s) Oral at bedtime  valsartan 80 milliGRAM(s) Oral daily  valsartan 40 milliGRAM(s) Oral once    VITALS:  T(C): , Max: 37.1 (11-05-22 @ 11:34)  T(F): , Max: 98.7 (11-05-22 @ 11:34)  HR: 81 (11-05-22 @ 13:30)  BP: 133/62 (11-05-22 @ 13:30)  RR: 18 (11-05-22 @ 13:30)  SpO2: 97% (11-05-22 @ 13:30)    I and O's:    11-04 @ 07:01  -  11-05 @ 07:00  --------------------------------------------------------  IN: 711 mL / OUT: 350 mL / NET: 361 mL    PHYSICAL EXAM:    Constitutional: NAD  Neck:  No JVD  Respiratory: CTAB/L  Cardiovascular: S1 and S2  Gastrointestinal: BS+, soft, NT/ND  Extremities: No peripheral edema  Neurological:  no focal deficits  Psychiatric: Normal mood, normal affect  : No Jay  Skin: No rashes  Access: avf    LABS:                        9.7    7.68  )-----------( 250      ( 04 Nov 2022 07:10 )             30.7     11-05    136  |  98  |  35<H>  ----------------------------<  280<H>  4.6   |  26  |  4.02<H>     NEPHROLOGY     Patient seen and examined resting comfortably, no complaints, not sob, in no acute distress.     MEDICATIONS  (STANDING):  allopurinol 100 milliGRAM(s) Oral daily  aspirin enteric coated 81 milliGRAM(s) Oral daily  atorvastatin 80 milliGRAM(s) Oral at bedtime  carvedilol 25 milliGRAM(s) Oral every 12 hours  clopidogrel Tablet 75 milliGRAM(s) Oral daily  dextrose 5%. 1000 milliLiter(s) (100 mL/Hr) IV Continuous <Continuous>  dextrose 5%. 1000 milliLiter(s) (50 mL/Hr) IV Continuous <Continuous>  dextrose 50% Injectable 25 Gram(s) IV Push once  dextrose 50% Injectable 12.5 Gram(s) IV Push once  dextrose 50% Injectable 25 Gram(s) IV Push once  dextrose Oral Gel 15 Gram(s) Oral once  epoetin wayne-epbx (RETACRIT) Injectable 65141 Unit(s) IV Push once  gabapentin 300 milliGRAM(s) Oral two times a day  glucagon  Injectable 1 milliGRAM(s) IntraMuscular once  heparin   Injectable 5000 Unit(s) SubCutaneous every 12 hours  insulin glargine Injectable (LANTUS) 5 Unit(s) SubCutaneous at bedtime  insulin lispro (ADMELOG) corrective regimen sliding scale   SubCutaneous three times a day before meals  insulin lispro (ADMELOG) corrective regimen sliding scale   SubCutaneous at bedtime  lidocaine/prilocaine Cream 1 Application(s) Topical <User Schedule>  tamsulosin 0.4 milliGRAM(s) Oral at bedtime  valsartan 80 milliGRAM(s) Oral daily  valsartan 40 milliGRAM(s) Oral once    VITALS:  T(C): , Max: 37.1 (11-05-22 @ 11:34)  T(F): , Max: 98.7 (11-05-22 @ 11:34)  HR: 81 (11-05-22 @ 13:30)  BP: 133/62 (11-05-22 @ 13:30)  RR: 18 (11-05-22 @ 13:30)  SpO2: 97% (11-05-22 @ 13:30)    I and O's:    11-04 @ 07:01  -  11-05 @ 07:00  --------------------------------------------------------  IN: 711 mL / OUT: 350 mL / NET: 361 mL    PHYSICAL EXAM:    Constitutional: NAD  Neck:  No JVD  Respiratory: CTAB/L  Cardiovascular: S1 and S2  Gastrointestinal: BS+, soft, NT/ND  Extremities: No peripheral edema  Neurological:  no focal deficits  Psychiatric: Normal mood, normal affect  : No Jay  Skin: No rashes  Access: avf    LABS:                        9.7    7.68  )-----------( 250      ( 04 Nov 2022 07:10 )             30.7     11-05    136  |  98  |  35<H>  ----------------------------<  280<H>  4.6   |  26  |  4.02<H>

## 2022-11-05 NOTE — PROGRESS NOTE ADULT - ASSESSMENT
ASSESSMENT:  (1)CKD - stage 5 - diabetic nephropathy  (2)LEYDA - prerenally mediated  (3)Fatigue/weakness - improved   (4)Triple vessel disease     RECOMMEND:  (1)HD today. Outpt HD center was already set up   (2)SP Cardiac stent and DAPT for 6 months    (3)Valsartan increased for tighter bp control  (4)Dose new meds for GFR <10     GLADYS Duque  [facility]  [phone]

## 2022-11-05 NOTE — PROGRESS NOTE ADULT - ASSESSMENT
2 yom   hx of CKD,   s/p DKA, DM.     cardiomyopathy,  CAD w/ stents, HTN, gout     generalized  weakness.  with   c/c h/o b/l leg weakness/ and   b/l arm weakness, r > l      p/w days of generalized weakness and AMS    per wife, pt was just in [facility] for DKA.  and  was d/c'd w/ fever and still weak.     more lethargic and altered especially when he waking up in morning/ glucose,  have been in the 200s            *   pt  admitted  with fevers/ ams. / metabolic  encephalopathy   on  arrival, wbc  was12,000       bcx/ ucx,  was negative      no source of infection found        was on   Zosyn  ,  Ct  a/p,   ? cystitis/   ID eval dr harrington    * CKD,  crt is 5,4/ ,  renal  dr dickerson     *   c/c  anemia     *   DM,  follow  fs        on lantus/  known to  [facility]     *  ef 40     *   c/c  weakness  of   limbs/  neuro d rajeev mccain       has  functional  quadriplegia/  CT  head, . old  arachnoid  cyst/  no intervention      *    acute  gout, right  wrist, on  prednisone/ colchicine     on dvt  ppx     MRI  head/  C  spine.  old  infarcts    much improved, more  alert/  spoke  with wife/     wife  wants  rehab/,  unable  to take care of  pt     glucos e noted,  stopped  admelog/  follow fs/ pt  is  a  finnicky  eater     echo  from 10/28.  ef  30/   s/p cath, cad.  LAD/ RCA/ pt   had refuse d intervention     staged  PCI on 11/2  and on 11/3   to  RCA  and  LAD    start    d/ c planning   to rehab/   cleared   for   d/c            rad< from: CT Abdomen and Pelvis No Cont (10.22.22 @ 15:40) >  IMPRESSION:  Mild bladder wall thickening. Correlate with urinalysis  --- End of Report ---  < end of copied text >      rad< from: Transthoracic Echocardiogram (12.07.21 @ 11:24) >  nclusions:  1. Normal mitral valve. Minimal mitral regurgitation.  2. Calcified trileaflet aortic valve with normal opening.  Minimal aortic regurgitation.  3. Mild segmental left ventricular systolic dysfunction.  The basal inferior, basal septum, distal septum, distal  inferior, and the apex are akinetic. Recommend limited  repeat imaging with intravenous echo contrast to better  visualize the apex. (Per sonographer, patient did not agree  to echo contrast during the study.)  4. Mild diastolic dysfunction (Stage I).  5. Normal right ventricular size and function.  *** No previous Echo exam.    < end of copied text >

## 2022-11-05 NOTE — PROGRESS NOTE ADULT - SUBJECTIVE AND OBJECTIVE BOX
afberile    REVIEW OF SYSTEMS:  GEN: no fever,    no chills  RESP: no SOB,   no cough  CVS: no chest pain,   no palpitations  GI: no abdominal pain,   no nausea,   no vomiting,   no constipation,   no diarrhea  : no dysuria,   no frequency  NEURO: no headache,   no dizziness  PSYCH: no depression,   not anxious  Derm : no rash    MEDICATIONS  (STANDING):  allopurinol 100 milliGRAM(s) Oral daily  aspirin enteric coated 81 milliGRAM(s) Oral daily  atorvastatin 80 milliGRAM(s) Oral at bedtime  carvedilol 25 milliGRAM(s) Oral every 12 hours  clopidogrel Tablet 75 milliGRAM(s) Oral daily  dextrose 5%. 1000 milliLiter(s) (100 mL/Hr) IV Continuous <Continuous>  dextrose 5%. 1000 milliLiter(s) (50 mL/Hr) IV Continuous <Continuous>  dextrose 50% Injectable 25 Gram(s) IV Push once  dextrose 50% Injectable 12.5 Gram(s) IV Push once  dextrose 50% Injectable 25 Gram(s) IV Push once  dextrose Oral Gel 15 Gram(s) Oral once  epoetin wayne-epbx (RETACRIT) Injectable 63412 Unit(s) IV Push once  gabapentin 300 milliGRAM(s) Oral two times a day  glucagon  Injectable 1 milliGRAM(s) IntraMuscular once  heparin   Injectable 5000 Unit(s) SubCutaneous every 12 hours  insulin glargine Injectable (LANTUS) 5 Unit(s) SubCutaneous at bedtime  insulin lispro (ADMELOG) corrective regimen sliding scale   SubCutaneous three times a day before meals  insulin lispro (ADMELOG) corrective regimen sliding scale   SubCutaneous at bedtime  lidocaine/prilocaine Cream 1 Application(s) Topical <User Schedule>  tamsulosin 0.4 milliGRAM(s) Oral at bedtime  valsartan 80 milliGRAM(s) Oral daily  valsartan 40 milliGRAM(s) Oral once    MEDICATIONS  (PRN):      Vital Signs Last 24 Hrs  T(C): 37.1 (05 Nov 2022 11:34), Max: 37.1 (05 Nov 2022 11:34)  T(F): 98.7 (05 Nov 2022 11:34), Max: 98.7 (05 Nov 2022 11:34)  HR: 77 (05 Nov 2022 11:34) (76 - 80)  BP: 139/63 (05 Nov 2022 11:34) (134/71 - 147/71)  BP(mean): --  RR: 18 (05 Nov 2022 11:34) (18 - 18)  SpO2: 95% (05 Nov 2022 11:34) (95% - 97%)    Parameters below as of 05 Nov 2022 11:34  Patient On (Oxygen Delivery Method): room air      CAPILLARY BLOOD GLUCOSE      POCT Blood Glucose.: 232 mg/dL (05 Nov 2022 08:31)  POCT Blood Glucose.: 239 mg/dL (04 Nov 2022 21:36)  POCT Blood Glucose.: 202 mg/dL (04 Nov 2022 17:41)    I&O's Summary    04 Nov 2022 07:01  -  05 Nov 2022 07:00  --------------------------------------------------------  IN: 711 mL / OUT: 350 mL / NET: 361 mL        PHYSICAL EXAM:  HEAD:  Atraumatic, Normocephalic  NECK: Supple, No   JVD  CHEST/LUNG:   no     rales,     no,    rhonchi  HEART: Regular rate and rhythm;         murmur  ABDOMEN: Soft, Nontender, ;   EXTREMITIES:     no   edema  NEUROLOGY:  alert    LABS:                        9.7    7.68  )-----------( 250      ( 04 Nov 2022 07:10 )             30.7     11-05    136  |  98  |  35<H>  ----------------------------<  280<H>  4.6   |  26  |  4.02<H>    Ca    8.1<L>      05 Nov 2022 07:32                              Consultant(s) Notes Reviewed:      Care Discussed with Consultants/Other Providers:

## 2022-11-05 NOTE — PROGRESS NOTE ADULT - SUBJECTIVE AND OBJECTIVE BOX
CARDIOLOGY     PROGRESS  NOTE   ________________________________________________    CHIEF COMPLAINT:Patient is a 62y old  Male who presents with a chief complaint of fevers (04 Nov 2022 11:34)  no complain  	  REVIEW OF SYSTEMS:  CONSTITUTIONAL: No fever, weight loss, or fatigue  EYES: No eye pain, visual disturbances, or discharge  ENT:  No difficulty hearing, tinnitus, vertigo; No sinus or throat pain  NECK: No pain or stiffness  RESPIRATORY: No cough, wheezing, chills or hemoptysis; No Shortness of Breath  CARDIOVASCULAR: No chest pain, palpitations, passing out, dizziness, or leg swelling  GASTROINTESTINAL: No abdominal or epigastric pain. No nausea, vomiting, or hematemesis; No diarrhea or constipation. No melena or hematochezia.  GENITOURINARY: No dysuria, frequency, hematuria, or incontinence  NEUROLOGICAL: No headaches, memory loss, loss of strength, numbness, or tremors  SKIN: No itching, burning, rashes, or lesions   LYMPH Nodes: No enlarged glands  ENDOCRINE: No heat or cold intolerance; No hair loss  MUSCULOSKELETAL: No joint pain or swelling; No muscle, back, or extremity pain  PSYCHIATRIC: No depression, anxiety, mood swings, or difficulty sleeping  HEME/LYMPH: No easy bruising, or bleeding gums  ALLERGY AND IMMUNOLOGIC: No hives or eczema	    [ x] All others negative	  [ ] Unable to obtain    PHYSICAL EXAM:  T(C): 36.4 (11-05-22 @ 04:18), Max: 37 (11-04-22 @ 11:36)  HR: 76 (11-05-22 @ 04:18) (76 - 80)  BP: 147/71 (11-05-22 @ 04:18) (117/69 - 147/71)  RR: 18 (11-05-22 @ 04:18) (18 - 18)  SpO2: 97% (11-05-22 @ 04:18) (94% - 97%)  Wt(kg): --  I&O's Summary    04 Nov 2022 07:01  -  05 Nov 2022 07:00  --------------------------------------------------------  IN: 711 mL / OUT: 350 mL / NET: 361 mL        Appearance: Normal	  HEENT:   Normal oral mucosa, PERRL, EOMI	  Lymphatic: No lymphadenopathy  Cardiovascular: Normal S1 S2, No JVD, + murmurs, No edema  Respiratory:rhonchi  Psychiatry: A & O x 3, Mood & affect appropriate  Gastrointestinal:  Soft, Non-tender, + BS	  Skin: No rashes, No ecchymoses, No cyanosis	  Neurologic: Non-focal  Extremities: Normal range of motion, No clubbing, cyanosis or edema  Vascular: Peripheral pulses palpable 2+ bilaterally    MEDICATIONS  (STANDING):  allopurinol 100 milliGRAM(s) Oral daily  aspirin enteric coated 81 milliGRAM(s) Oral daily  atorvastatin 80 milliGRAM(s) Oral at bedtime  carvedilol 25 milliGRAM(s) Oral every 12 hours  clopidogrel Tablet 75 milliGRAM(s) Oral daily  dextrose 5%. 1000 milliLiter(s) (50 mL/Hr) IV Continuous <Continuous>  dextrose 5%. 1000 milliLiter(s) (100 mL/Hr) IV Continuous <Continuous>  dextrose 50% Injectable 25 Gram(s) IV Push once  dextrose 50% Injectable 12.5 Gram(s) IV Push once  dextrose 50% Injectable 25 Gram(s) IV Push once  dextrose Oral Gel 15 Gram(s) Oral once  epoetin wayne-epbx (RETACRIT) Injectable 29850 Unit(s) IV Push once  gabapentin 300 milliGRAM(s) Oral two times a day  glucagon  Injectable 1 milliGRAM(s) IntraMuscular once  heparin   Injectable 5000 Unit(s) SubCutaneous every 12 hours  insulin glargine Injectable (LANTUS) 5 Unit(s) SubCutaneous at bedtime  insulin lispro (ADMELOG) corrective regimen sliding scale   SubCutaneous three times a day before meals  insulin lispro (ADMELOG) corrective regimen sliding scale   SubCutaneous at bedtime  lidocaine/prilocaine Cream 1 Application(s) Topical <User Schedule>  tamsulosin 0.4 milliGRAM(s) Oral at bedtime  valsartan 40 milliGRAM(s) Oral two times a day      TELEMETRY: 	    ECG:  	  RADIOLOGY:  OTHER: 	  	  LABS:	 	    CARDIAC MARKERS:                                9.7    7.68  )-----------( 250      ( 04 Nov 2022 07:10 )             30.7     11-05    136  |  98  |  35<H>  ----------------------------<  280<H>  4.6   |  26  |  4.02<H>    Ca    8.1<L>      05 Nov 2022 07:32      proBNP:   Lipid Profile:   HgA1c:   TSH:         Assessment and plan  ---------------------------  62 yom hx of CKD, DKA, DMtype2, cardiomyopathy w/ stents, HTN, gout p/w days of generalized weakness and AMS. Per wife, pt was just in [facility] for DKA. Was d/c'd w/ fever and still weak. Since tuesday, pt becoming more lethargic and altered especially when he waking up in morning. BG have been in the 200s since per cont BG monitor. Subjective fevers at home, wife feels that his abd is larger than it was before d/c from [facility]. Question of if pt has heather compliant w/ short and long acting insulin as wife works nights and is not there to give all meds at home. Unclear if pt has been falling at home but w/ bruising on his abd.   pt with hx of ashd, chf, cardiomyopathy, dm, s/p CARDIOMEM fu at [facility] with change of mental stratus and sob.  ct scan noted , no active chf  will adjust cardiac meds  will call chf team at [facility] to get Cardiomem readings  beta blocker/ hydralazine and Nitrate  ckd, renal follow up  check pro bnp  dvt prophylaxis  pt with known hx of cad, last stress test  in 07/2021, no ischemia with scar in mary septal and apical area  ct chest abdomen and pelvis noted, no pleural effusion or sign of chf  renal increased renal function last admission CR 4.8  continue asa daily /coreg will add ace as pt on HD  avoid excess fluid, may dc ivf, hx of chf , EF40%  s/p HD yesterday  dc colchicine  MRI noted may consider thoracic MRI  start on ARB while on HD, repeat echo noted with sig lv dysfunction and wall motion abnormality  increase fluid withrawal with increase LV pressure  increase valsartan as tolertaed  s/p stent yesterday , now with second stent today px LAD  increase valsartan for better bp control  HD  continue DAPT for 6 months  dc planning

## 2022-11-06 LAB
GLUCOSE BLDC GLUCOMTR-MCNC: 130 MG/DL — HIGH (ref 70–99)
GLUCOSE BLDC GLUCOMTR-MCNC: 131 MG/DL — HIGH (ref 70–99)
GLUCOSE BLDC GLUCOMTR-MCNC: 153 MG/DL — HIGH (ref 70–99)
GLUCOSE BLDC GLUCOMTR-MCNC: 164 MG/DL — HIGH (ref 70–99)
SARS-COV-2 RNA SPEC QL NAA+PROBE: SIGNIFICANT CHANGE UP

## 2022-11-06 RX ADMIN — CLOPIDOGREL BISULFATE 75 MILLIGRAM(S): 75 TABLET, FILM COATED ORAL at 09:04

## 2022-11-06 RX ADMIN — Medication 100 MILLIGRAM(S): at 09:04

## 2022-11-06 RX ADMIN — TAMSULOSIN HYDROCHLORIDE 0.4 MILLIGRAM(S): 0.4 CAPSULE ORAL at 21:48

## 2022-11-06 RX ADMIN — CARVEDILOL PHOSPHATE 25 MILLIGRAM(S): 80 CAPSULE, EXTENDED RELEASE ORAL at 05:04

## 2022-11-06 RX ADMIN — INSULIN GLARGINE 7 UNIT(S): 100 INJECTION, SOLUTION SUBCUTANEOUS at 21:48

## 2022-11-06 RX ADMIN — Medication 3 UNIT(S): at 09:05

## 2022-11-06 RX ADMIN — Medication 3 UNIT(S): at 13:30

## 2022-11-06 RX ADMIN — CARVEDILOL PHOSPHATE 25 MILLIGRAM(S): 80 CAPSULE, EXTENDED RELEASE ORAL at 18:05

## 2022-11-06 RX ADMIN — GABAPENTIN 300 MILLIGRAM(S): 400 CAPSULE ORAL at 05:04

## 2022-11-06 RX ADMIN — Medication 1: at 18:04

## 2022-11-06 RX ADMIN — HEPARIN SODIUM 5000 UNIT(S): 5000 INJECTION INTRAVENOUS; SUBCUTANEOUS at 18:05

## 2022-11-06 RX ADMIN — Medication 1: at 13:30

## 2022-11-06 RX ADMIN — Medication 3 UNIT(S): at 18:05

## 2022-11-06 RX ADMIN — Medication 81 MILLIGRAM(S): at 09:05

## 2022-11-06 RX ADMIN — GABAPENTIN 300 MILLIGRAM(S): 400 CAPSULE ORAL at 18:05

## 2022-11-06 RX ADMIN — VALSARTAN 80 MILLIGRAM(S): 80 TABLET ORAL at 05:04

## 2022-11-06 RX ADMIN — ATORVASTATIN CALCIUM 80 MILLIGRAM(S): 80 TABLET, FILM COATED ORAL at 21:47

## 2022-11-06 RX ADMIN — HEPARIN SODIUM 5000 UNIT(S): 5000 INJECTION INTRAVENOUS; SUBCUTANEOUS at 05:04

## 2022-11-06 NOTE — PROGRESS NOTE ADULT - ASSESSMENT
Assessment  DMT2: 62y Male with DM T2 with hyperglycemia admitted with weakness, was on insulin at home, now on basal insulin and coverage, blood sugars are improving no new hypoglycemic episodes today, s/p PCI, eats with inconsistent intake, appears comfortable in NAD.  CAD: on medications, monitored, asymptomatic.  HTN: On antihypertensive medications, monitored, asymptomatic.  CKD:  labs, chart reviewed.          Rubén Escoto MD  Cell:  [phone]  Office: [phone]    Rachel SHORT  Cell:   Office: [phone]

## 2022-11-06 NOTE — PROGRESS NOTE ADULT - SUBJECTIVE AND OBJECTIVE BOX
afberile    REVIEW OF SYSTEMS:  GEN: no fever,    no chills  RESP: no SOB,   no cough  CVS: no chest pain,   no palpitations  GI: no abdominal pain,   no nausea,   no vomiting,   no constipation,   no diarrhea  : no dysuria,   no frequency  NEURO: no headache,   no dizziness  PSYCH: no depression,   not anxious  Derm : no rash    MEDICATIONS  (STANDING):  allopurinol 100 milliGRAM(s) Oral daily  aspirin enteric coated 81 milliGRAM(s) Oral daily  atorvastatin 80 milliGRAM(s) Oral at bedtime  carvedilol 25 milliGRAM(s) Oral every 12 hours  clopidogrel Tablet 75 milliGRAM(s) Oral daily  dextrose 5%. 1000 milliLiter(s) (100 mL/Hr) IV Continuous <Continuous>  dextrose 5%. 1000 milliLiter(s) (50 mL/Hr) IV Continuous <Continuous>  dextrose 50% Injectable 25 Gram(s) IV Push once  dextrose 50% Injectable 12.5 Gram(s) IV Push once  dextrose 50% Injectable 25 Gram(s) IV Push once  dextrose Oral Gel 15 Gram(s) Oral once  gabapentin 300 milliGRAM(s) Oral two times a day  glucagon  Injectable 1 milliGRAM(s) IntraMuscular once  heparin   Injectable 5000 Unit(s) SubCutaneous every 12 hours  insulin glargine Injectable (LANTUS) 7 Unit(s) SubCutaneous at bedtime  insulin lispro (ADMELOG) corrective regimen sliding scale   SubCutaneous at bedtime  insulin lispro (ADMELOG) corrective regimen sliding scale   SubCutaneous three times a day before meals  insulin lispro Injectable (ADMELOG) 3 Unit(s) SubCutaneous three times a day before meals  lidocaine/prilocaine Cream 1 Application(s) Topical <User Schedule>  tamsulosin 0.4 milliGRAM(s) Oral at bedtime  valsartan 80 milliGRAM(s) Oral daily    MEDICATIONS  (PRN):      Vital Signs Last 24 Hrs  T(C): 37.1 (06 Nov 2022 11:23), Max: 37.3 (06 Nov 2022 04:45)  T(F): 98.7 (06 Nov 2022 11:23), Max: 99.2 (06 Nov 2022 04:45)  HR: 80 (06 Nov 2022 11:23) (72 - 87)  BP: 153/68 (06 Nov 2022 11:23) (124/59 - 153/68)  BP(mean): --  RR: 18 (06 Nov 2022 11:23) (18 - 18)  SpO2: 96% (06 Nov 2022 11:23) (96% - 99%)    Parameters below as of 06 Nov 2022 11:23  Patient On (Oxygen Delivery Method): room air      CAPILLARY BLOOD GLUCOSE      POCT Blood Glucose.: 153 mg/dL (06 Nov 2022 12:54)  POCT Blood Glucose.: 131 mg/dL (06 Nov 2022 08:31)  POCT Blood Glucose.: 148 mg/dL (05 Nov 2022 21:19)  POCT Blood Glucose.: 159 mg/dL (05 Nov 2022 17:31)    I&O's Summary    05 Nov 2022 08:01  -  06 Nov 2022 07:00  --------------------------------------------------------  IN: 180 mL / OUT: 1200 mL / NET: -1020 mL    06 Nov 2022 07:01  -  06 Nov 2022 15:11  --------------------------------------------------------  IN: 300 mL / OUT: 0 mL / NET: 300 mL        PHYSICAL EXAM:  HEAD:  Atraumatic, Normocephalic  NECK: Supple, No   JVD  CHEST/LUNG:   no     rales,     no,    rhonchi  HEART: Regular rate and rhythm;         murmur  ABDOMEN: Soft, Nontender, ;   EXTREMITIES:    np    edema  NEUROLOGY:  alert    LABS:    11-05    136  |  98  |  35<H>  ----------------------------<  280<H>  4.6   |  26  |  4.02<H>    Ca    8.1<L>      05 Nov 2022 07:32                              Consultant(s) Notes Reviewed:      Care Discussed with Consultants/Other Providers:

## 2022-11-06 NOTE — PROGRESS NOTE ADULT - ASSESSMENT
2 yom   hx of CKD,   s/p DKA, DM.     cardiomyopathy,  CAD w/ stents, HTN, gout     generalized  weakness.  with   c/c h/o b/l leg weakness/ and   b/l arm weakness, r > l      p/w days of generalized weakness and AMS    per wife, pt was just in [facility] for DKA.  and  was d/c'd w/ fever and still weak.     more lethargic and altered especially when he waking up in morning/ glucose,  have been in the 200s            *   pt  admitted  with fevers/ ams. / metabolic  encephalopathy   on  arrival, wbc  was12,000       bcx/ ucx,  was negative      no source of infection found        was on   Zosyn  ,  Ct  a/p,   ? cystitis/   ID eval dr harrington    * CKD,  crt is 5,4/ ,  renal  dr dickerson     *   c/c  anemia     *   DM,  follow  fs        on lantus/  known to  [facility]     *  ef 40     *   c/c  weakness  of   limbs/  neuro d rajeev mccain       has  functional  quadriplegia/  CT  head, . old  arachnoid  cyst/  no intervention      *    acute  gout, right  wrist, on  prednisone/ colchicine     on dvt  ppx     MRI  head/  C  spine.  old  infarcts    much improved, more  alert/  spoke  with wife/     wife  wants  rehab/,  unable  to take care of  pt     glucos e noted,  stopped  admelog/  follow fs/ pt  is  a  finnicky  eater     echo  from 10/28.  ef  30/   s/p cath, cad.  LAD/ RCA/ pt   had refuse d intervention     staged  PCI on 11/2  and on 11/3   to  RCA  and  LAD    start    d/ c planning   to rehab/   cleared   for   d/c     awiating  d/ c to rehab,  doing well           rad< from: CT Abdomen and Pelvis No Cont (10.22.22 @ 15:40) >  IMPRESSION:  Mild bladder wall thickening. Correlate with urinalysis  --- End of Report ---  < end of copied text >      rad< from: Transthoracic Echocardiogram (12.07.21 @ 11:24) >  nclusions:  1. Normal mitral valve. Minimal mitral regurgitation.  2. Calcified trileaflet aortic valve with normal opening.  Minimal aortic regurgitation.  3. Mild segmental left ventricular systolic dysfunction.  The basal inferior, basal septum, distal septum, distal  inferior, and the apex are akinetic. Recommend limited  repeat imaging with intravenous echo contrast to better  visualize the apex. (Per sonographer, patient did not agree  to echo contrast during the study.)  4. Mild diastolic dysfunction (Stage I).  5. Normal right ventricular size and function.  *** No previous Echo exam.    < end of copied text >

## 2022-11-06 NOTE — PROGRESS NOTE ADULT - SUBJECTIVE AND OBJECTIVE BOX
Chief complaint    Patient is a 62y old  Male who presents with a chief complaint of fevers (06 Nov 2022 15:11)   Review of systems  Patient in bed, appears comfortable.    Labs and Fingersticks  CAPILLARY BLOOD GLUCOSE      POCT Blood Glucose.: 153 mg/dL (06 Nov 2022 12:54)  POCT Blood Glucose.: 131 mg/dL (06 Nov 2022 08:31)  POCT Blood Glucose.: 148 mg/dL (05 Nov 2022 21:19)  POCT Blood Glucose.: 159 mg/dL (05 Nov 2022 17:31)      Anion Gap, Serum: 12 (11-05 @ 07:32)      Calcium, Total Serum: 8.1 *L* (11-05 @ 07:32)          11-05    136  |  98  |  35<H>  ----------------------------<  280<H>  4.6   |  26  |  4.02<H>    Ca    8.1<L>      05 Nov 2022 07:32      Medications  MEDICATIONS  (STANDING):  allopurinol 100 milliGRAM(s) Oral daily  aspirin enteric coated 81 milliGRAM(s) Oral daily  atorvastatin 80 milliGRAM(s) Oral at bedtime  carvedilol 25 milliGRAM(s) Oral every 12 hours  clopidogrel Tablet 75 milliGRAM(s) Oral daily  dextrose 5%. 1000 milliLiter(s) (100 mL/Hr) IV Continuous <Continuous>  dextrose 5%. 1000 milliLiter(s) (50 mL/Hr) IV Continuous <Continuous>  dextrose 50% Injectable 25 Gram(s) IV Push once  dextrose 50% Injectable 12.5 Gram(s) IV Push once  dextrose 50% Injectable 25 Gram(s) IV Push once  dextrose Oral Gel 15 Gram(s) Oral once  gabapentin 300 milliGRAM(s) Oral two times a day  glucagon  Injectable 1 milliGRAM(s) IntraMuscular once  heparin   Injectable 5000 Unit(s) SubCutaneous every 12 hours  insulin glargine Injectable (LANTUS) 7 Unit(s) SubCutaneous at bedtime  insulin lispro (ADMELOG) corrective regimen sliding scale   SubCutaneous three times a day before meals  insulin lispro (ADMELOG) corrective regimen sliding scale   SubCutaneous at bedtime  insulin lispro Injectable (ADMELOG) 3 Unit(s) SubCutaneous three times a day before meals  lidocaine/prilocaine Cream 1 Application(s) Topical <User Schedule>  tamsulosin 0.4 milliGRAM(s) Oral at bedtime  valsartan 80 milliGRAM(s) Oral daily      Physical Exam  General: Patient appears comfortable.  Vital Signs Last 12 Hrs  T(F): 98.7 (11-06-22 @ 11:23), Max: 99.2 (11-06-22 @ 04:45)  HR: 80 (11-06-22 @ 11:23) (80 - 87)  BP: 153/68 (11-06-22 @ 11:23) (151/77 - 153/68)  BP(mean): --  RR: 18 (11-06-22 @ 11:23) (18 - 18)  SpO2: 96% (11-06-22 @ 11:23) (96% - 96%)  Neck: No palpable thyroid nodules.  CVS: S1S2, No murmurs  Respiratory: No wheezing, no crepitations  GI: Abdomen soft, non tender.  Musculoskeletal:  edema lower extremities.     Diagnostics    A1C with Estimated Average Glucose: Routine (10-23 @ 07:37)  A1C with Estimated Average Glucose: Routine  Cancel Reason: Duplicate Order (10-23 @ 07:36)  A1C with Estimated Average Glucose: Routine  Cancel Reason: Duplicate Order (10-23 @ 07:35)

## 2022-11-06 NOTE — PROGRESS NOTE ADULT - SUBJECTIVE AND OBJECTIVE BOX
CARDIOLOGY     PROGRESS  NOTE   ________________________________________________    CHIEF COMPLAINT:Patient is a 62y old  Male who presents with a chief complaint of fevers (05 Nov 2022 14:22)  doing well  	  REVIEW OF SYSTEMS:  CONSTITUTIONAL: No fever, weight loss, or fatigue  EYES: No eye pain, visual disturbances, or discharge  ENT:  No difficulty hearing, tinnitus, vertigo; No sinus or throat pain  NECK: No pain or stiffness  RESPIRATORY: No cough, wheezing, chills or hemoptysis; No Shortness of Breath  CARDIOVASCULAR: No chest pain, palpitations, passing out, dizziness, or leg swelling  GASTROINTESTINAL: No abdominal or epigastric pain. No nausea, vomiting, or hematemesis; No diarrhea or constipation. No melena or hematochezia.  GENITOURINARY: No dysuria, frequency, hematuria, or incontinence  NEUROLOGICAL: No headaches, memory loss, loss of strength, numbness, or tremors  SKIN: No itching, burning, rashes, or lesions   LYMPH Nodes: No enlarged glands  ENDOCRINE: No heat or cold intolerance; No hair loss  MUSCULOSKELETAL: No joint pain or swelling; No muscle, back, or extremity pain  PSYCHIATRIC: No depression, anxiety, mood swings, or difficulty sleeping  HEME/LYMPH: No easy bruising, or bleeding gums  ALLERGY AND IMMUNOLOGIC: No hives or eczema	    [ ] All others negative	  [ ] Unable to obtain    PHYSICAL EXAM:  T(C): 37.3 (11-06-22 @ 04:45), Max: 37.3 (11-06-22 @ 04:45)  HR: 87 (11-06-22 @ 04:45) (72 - 87)  BP: 151/77 (11-06-22 @ 04:45) (124/59 - 151/77)  RR: 18 (11-06-22 @ 04:45) (18 - 18)  SpO2: 96% (11-06-22 @ 04:45) (95% - 99%)  Wt(kg): --  I&O's Summary    05 Nov 2022 08:01  -  06 Nov 2022 07:00  --------------------------------------------------------  IN: 180 mL / OUT: 1200 mL / NET: -1020 mL        Appearance: Normal	  HEENT:   Normal oral mucosa, PERRL, EOMI	  Lymphatic: No lymphadenopathy  Cardiovascular: Normal S1 S2, No JVD, + murmurs, No edema  Respiratory: rhonchi  Psychiatry: A & O x 3, Mood & affect appropriate  Gastrointestinal:  Soft, Non-tender, + BS	  Skin: No rashes, No ecchymoses, No cyanosis	  Neurologic: Non-focal  Extremities: Normal range of motion, No clubbing, cyanosis or edema  Vascular: Peripheral pulses palpable 2+ bilaterally    MEDICATIONS  (STANDING):  allopurinol 100 milliGRAM(s) Oral daily  aspirin enteric coated 81 milliGRAM(s) Oral daily  atorvastatin 80 milliGRAM(s) Oral at bedtime  carvedilol 25 milliGRAM(s) Oral every 12 hours  clopidogrel Tablet 75 milliGRAM(s) Oral daily  dextrose 5%. 1000 milliLiter(s) (100 mL/Hr) IV Continuous <Continuous>  dextrose 5%. 1000 milliLiter(s) (50 mL/Hr) IV Continuous <Continuous>  dextrose 50% Injectable 12.5 Gram(s) IV Push once  dextrose 50% Injectable 25 Gram(s) IV Push once  dextrose 50% Injectable 25 Gram(s) IV Push once  dextrose Oral Gel 15 Gram(s) Oral once  gabapentin 300 milliGRAM(s) Oral two times a day  glucagon  Injectable 1 milliGRAM(s) IntraMuscular once  heparin   Injectable 5000 Unit(s) SubCutaneous every 12 hours  insulin glargine Injectable (LANTUS) 7 Unit(s) SubCutaneous at bedtime  insulin lispro (ADMELOG) corrective regimen sliding scale   SubCutaneous three times a day before meals  insulin lispro (ADMELOG) corrective regimen sliding scale   SubCutaneous at bedtime  insulin lispro Injectable (ADMELOG) 3 Unit(s) SubCutaneous three times a day before meals  lidocaine/prilocaine Cream 1 Application(s) Topical <User Schedule>  tamsulosin 0.4 milliGRAM(s) Oral at bedtime  valsartan 80 milliGRAM(s) Oral daily      TELEMETRY: 	    ECG:  	  RADIOLOGY:  OTHER: 	  	  LABS:	 	    CARDIAC MARKERS:            11-05    136  |  98  |  35<H>  ----------------------------<  280<H>  4.6   |  26  |  4.02<H>    Ca    8.1<L>      05 Nov 2022 07:32      proBNP:   Lipid Profile:   HgA1c:   TSH:         Assessment and plan  ---------------------------  62 yom hx of CKD, DKA, DMtype2, cardiomyopathy w/ stents, HTN, gout p/w days of generalized weakness and AMS. Per wife, pt was just in [facility] for DKA. Was d/c'd w/ fever and still weak. Since tuesday, pt becoming more lethargic and altered especially when he waking up in morning. BG have been in the 200s since per cont BG monitor. Subjective fevers at home, wife feels that his abd is larger than it was before d/c from [facility]. Question of if pt has heather compliant w/ short and long acting insulin as wife works nights and is not there to give all meds at home. Unclear if pt has been falling at home but w/ bruising on his abd.   pt with hx of ashd, chf, cardiomyopathy, dm, s/p CARDIOMEM fu at [facility] with change of mental stratus and sob.  ct scan noted , no active chf  will adjust cardiac meds  will call chf team at [facility] to get Cardiomem readings  beta blocker/ hydralazine and Nitrate  ckd, renal follow up  check pro bnp  dvt prophylaxis  pt with known hx of cad, last stress test  in 07/2021, no ischemia with scar in mary septal and apical area  ct chest abdomen and pelvis noted, no pleural effusion or sign of chf  renal increased renal function last admission CR 4.8  continue asa daily /coreg will add ace as pt on HD  avoid excess fluid, may dc ivf, hx of chf , EF40%  s/p HD yesterday  dc colchicine  MRI noted may consider thoracic MRI  start on ARB while on HD, repeat echo noted with sig lv dysfunction and wall motion abnormality  increase fluid withrawal with increase LV pressure  increase valsartan as tolertaed  s/p stent yesterday , now with second stent today px LAD  increase valsartan for better bp control  HD  continue DAPT for 6 months, then will switch asa daily  dc planning

## 2022-11-07 LAB
GLUCOSE BLDC GLUCOMTR-MCNC: 115 MG/DL — HIGH (ref 70–99)
GLUCOSE BLDC GLUCOMTR-MCNC: 151 MG/DL — HIGH (ref 70–99)
GLUCOSE BLDC GLUCOMTR-MCNC: 154 MG/DL — HIGH (ref 70–99)
GLUCOSE BLDC GLUCOMTR-MCNC: 204 MG/DL — HIGH (ref 70–99)
HCT VFR BLD CALC: 30.1 % — LOW (ref 39–50)
HGB BLD-MCNC: 9.3 G/DL — LOW (ref 13–17)
MCHC RBC-ENTMCNC: 28.3 PG — SIGNIFICANT CHANGE UP (ref 27–34)
MCHC RBC-ENTMCNC: 30.9 GM/DL — LOW (ref 32–36)
MCV RBC AUTO: 91.5 FL — SIGNIFICANT CHANGE UP (ref 80–100)
NRBC # BLD: 0 /100 WBCS — SIGNIFICANT CHANGE UP (ref 0–0)
PLATELET # BLD AUTO: 190 K/UL — SIGNIFICANT CHANGE UP (ref 150–400)
RBC # BLD: 3.29 M/UL — LOW (ref 4.2–5.8)
RBC # FLD: 14.6 % — HIGH (ref 10.3–14.5)
WBC # BLD: 9.09 K/UL — SIGNIFICANT CHANGE UP (ref 3.8–10.5)
WBC # FLD AUTO: 9.09 K/UL — SIGNIFICANT CHANGE UP (ref 3.8–10.5)

## 2022-11-07 RX ORDER — ACETAMINOPHEN 500 MG
650 TABLET ORAL EVERY 6 HOURS
Refills: 0 | Status: DISCONTINUED | OUTPATIENT
Start: 2022-11-07 | End: 2022-11-14

## 2022-11-07 RX ORDER — ERYTHROPOIETIN 10000 [IU]/ML
4000 INJECTION, SOLUTION INTRAVENOUS; SUBCUTANEOUS ONCE
Refills: 0 | Status: COMPLETED | OUTPATIENT
Start: 2022-11-08 | End: 2022-11-08

## 2022-11-07 RX ADMIN — HEPARIN SODIUM 5000 UNIT(S): 5000 INJECTION INTRAVENOUS; SUBCUTANEOUS at 05:30

## 2022-11-07 RX ADMIN — Medication 3 UNIT(S): at 12:54

## 2022-11-07 RX ADMIN — Medication 650 MILLIGRAM(S): at 23:38

## 2022-11-07 RX ADMIN — Medication 1: at 17:12

## 2022-11-07 RX ADMIN — GABAPENTIN 300 MILLIGRAM(S): 400 CAPSULE ORAL at 17:12

## 2022-11-07 RX ADMIN — ATORVASTATIN CALCIUM 80 MILLIGRAM(S): 80 TABLET, FILM COATED ORAL at 21:49

## 2022-11-07 RX ADMIN — HEPARIN SODIUM 5000 UNIT(S): 5000 INJECTION INTRAVENOUS; SUBCUTANEOUS at 17:12

## 2022-11-07 RX ADMIN — CARVEDILOL PHOSPHATE 25 MILLIGRAM(S): 80 CAPSULE, EXTENDED RELEASE ORAL at 05:30

## 2022-11-07 RX ADMIN — Medication 81 MILLIGRAM(S): at 11:07

## 2022-11-07 RX ADMIN — CARVEDILOL PHOSPHATE 25 MILLIGRAM(S): 80 CAPSULE, EXTENDED RELEASE ORAL at 17:12

## 2022-11-07 RX ADMIN — TAMSULOSIN HYDROCHLORIDE 0.4 MILLIGRAM(S): 0.4 CAPSULE ORAL at 21:49

## 2022-11-07 RX ADMIN — INSULIN GLARGINE 7 UNIT(S): 100 INJECTION, SOLUTION SUBCUTANEOUS at 21:49

## 2022-11-07 RX ADMIN — GABAPENTIN 300 MILLIGRAM(S): 400 CAPSULE ORAL at 06:14

## 2022-11-07 RX ADMIN — Medication 2: at 12:54

## 2022-11-07 RX ADMIN — CLOPIDOGREL BISULFATE 75 MILLIGRAM(S): 75 TABLET, FILM COATED ORAL at 11:07

## 2022-11-07 RX ADMIN — Medication 3 UNIT(S): at 08:51

## 2022-11-07 RX ADMIN — Medication 650 MILLIGRAM(S): at 22:31

## 2022-11-07 RX ADMIN — Medication 100 MILLIGRAM(S): at 11:07

## 2022-11-07 RX ADMIN — VALSARTAN 80 MILLIGRAM(S): 80 TABLET ORAL at 05:30

## 2022-11-07 RX ADMIN — Medication 3 UNIT(S): at 17:12

## 2022-11-07 NOTE — PROGRESS NOTE ADULT - SUBJECTIVE AND OBJECTIVE BOX
CARDIOLOGY     PROGRESS  NOTE   ________________________________________________    CHIEF COMPLAINT:Patient is a 62y old  Male who presents with a chief complaint of fevers (06 Nov 2022 16:07)  no complain.  	  REVIEW OF SYSTEMS:  CONSTITUTIONAL: No fever, weight loss, or fatigue  EYES: No eye pain, visual disturbances, or discharge  ENT:  No difficulty hearing, tinnitus, vertigo; No sinus or throat pain  NECK: No pain or stiffness  RESPIRATORY: No cough, wheezing, chills or hemoptysis; no Shortness of Breath  CARDIOVASCULAR: No chest pain, palpitations, passing out, dizziness, or leg swelling  GASTROINTESTINAL: No abdominal or epigastric pain. No nausea, vomiting, or hematemesis; No diarrhea or constipation. No melena or hematochezia.  GENITOURINARY: No dysuria, frequency, hematuria, or incontinence  NEUROLOGICAL: No headaches, memory loss, loss of strength, numbness, or tremors  SKIN: No itching, burning, rashes, or lesions   LYMPH Nodes: No enlarged glands  ENDOCRINE: No heat or cold intolerance; No hair loss  MUSCULOSKELETAL: No joint pain or swelling; No muscle, back, or extremity pain  PSYCHIATRIC: No depression, anxiety, mood swings, or difficulty sleeping  HEME/LYMPH: No easy bruising, or bleeding gums  ALLERGY AND IMMUNOLOGIC: No hives or eczema	    [ ] All others negative	  [ ] Unable to obtain    PHYSICAL EXAM:  T(C): 36.7 (11-07-22 @ 04:52), Max: 37.1 (11-06-22 @ 11:23)  HR: 73 (11-07-22 @ 04:52) (68 - 80)  BP: 135/74 (11-07-22 @ 04:52) (109/66 - 153/68)  RR: 18 (11-07-22 @ 04:52) (18 - 18)  SpO2: 99% (11-07-22 @ 04:52) (92% - 99%)  Wt(kg): --  I&O's Summary    06 Nov 2022 07:01  -  07 Nov 2022 07:00  --------------------------------------------------------  IN: 820 mL / OUT: 450 mL / NET: 370 mL        Appearance: Normal	  HEENT:   Normal oral mucosa, PERRL, EOMI	  Lymphatic: No lymphadenopathy  Cardiovascular: Normal S1 S2, No JVD, + murmurs, No edema  Respiratory: rhonchi  Gastrointestinal:  Soft, Non-tender, + BS	  Skin: No rashes, No ecchymoses, No cyanosis	  Neurologic: Non-focal  Extremities: Normal range of motion, No clubbing, cyanosis or edema  Vascular: Peripheral pulses palpable 2+ bilaterally    MEDICATIONS  (STANDING):  allopurinol 100 milliGRAM(s) Oral daily  aspirin enteric coated 81 milliGRAM(s) Oral daily  atorvastatin 80 milliGRAM(s) Oral at bedtime  carvedilol 25 milliGRAM(s) Oral every 12 hours  clopidogrel Tablet 75 milliGRAM(s) Oral daily  dextrose 5%. 1000 milliLiter(s) (50 mL/Hr) IV Continuous <Continuous>  dextrose 5%. 1000 milliLiter(s) (100 mL/Hr) IV Continuous <Continuous>  dextrose 50% Injectable 25 Gram(s) IV Push once  dextrose 50% Injectable 12.5 Gram(s) IV Push once  dextrose 50% Injectable 25 Gram(s) IV Push once  dextrose Oral Gel 15 Gram(s) Oral once  gabapentin 300 milliGRAM(s) Oral two times a day  glucagon  Injectable 1 milliGRAM(s) IntraMuscular once  heparin   Injectable 5000 Unit(s) SubCutaneous every 12 hours  insulin glargine Injectable (LANTUS) 7 Unit(s) SubCutaneous at bedtime  insulin lispro (ADMELOG) corrective regimen sliding scale   SubCutaneous three times a day before meals  insulin lispro (ADMELOG) corrective regimen sliding scale   SubCutaneous at bedtime  insulin lispro Injectable (ADMELOG) 3 Unit(s) SubCutaneous three times a day before meals  lidocaine/prilocaine Cream 1 Application(s) Topical <User Schedule>  tamsulosin 0.4 milliGRAM(s) Oral at bedtime  valsartan 80 milliGRAM(s) Oral daily      TELEMETRY: 	    ECG:  	  RADIOLOGY:  OTHER: 	  	  LABS:	 	    CARDIAC MARKERS:                  proBNP:   Lipid Profile:   HgA1c:   TSH:         Assessment and plan  ---------------------------  62 yom hx of CKD, DKA, DMtype2, cardiomyopathy w/ stents, HTN, gout p/w days of generalized weakness and AMS. Per wife, pt was just in [facility] for DKA. Was d/c'd w/ fever and still weak. Since tuesday, pt becoming more lethargic and altered especially when he waking up in morning. BG have been in the 200s since per cont BG monitor. Subjective fevers at home, wife feels that his abd is larger than it was before d/c from [facility]. Question of if pt has heather compliant w/ short and long acting insulin as wife works nights and is not there to give all meds at home. Unclear if pt has been falling at home but w/ bruising on his abd.   pt with hx of ashd, chf, cardiomyopathy, dm, s/p CARDIOMEM fu at [facility] with change of mental stratus and sob.  ct scan noted , no active chf  will adjust cardiac meds  will call chf team at [facility] to get Cardiomem readings  beta blocker/ hydralazine and Nitrate  ckd, renal follow up  check pro bnp  dvt prophylaxis  pt with known hx of cad, last stress test  in 07/2021, no ischemia with scar in mary septal and apical area  ct chest abdomen and pelvis noted, no pleural effusion or sign of chf  renal increased renal function last admission CR 4.8  continue asa daily /coreg will add ace as pt on HD  avoid excess fluid, may dc ivf, hx of chf , EF40%  s/p HD yesterday  dc colchicine  MRI noted may consider thoracic MRI  start on ARB while on HD, repeat echo noted with sig lv dysfunction and wall motion abnormality  increase fluid withrawal with increase LV pressure  increase valsartan as tolertaed  s/p stent yesterday , now with second stent today px LAD  increase valsartan for better bp control  HD  continue DAPT for 6 months, then will switch asa daily  dc planning  bp is well controlled, fu in 2 weeks post dc

## 2022-11-07 NOTE — PROGRESS NOTE ADULT - SUBJECTIVE AND OBJECTIVE BOX
afberile    REVIEW OF SYSTEMS:  GEN: no fever,    no chills  RESP: no SOB,   no cough  CVS: no chest pain,   no palpitations  GI: no abdominal pain,   no nausea,   no vomiting,   no constipation,   no diarrhea  : no dysuria,   no frequency  NEURO: no headache,   no dizziness  PSYCH: no depression,   not anxious  Derm : no rash    MEDICATIONS  (STANDING):  allopurinol 100 milliGRAM(s) Oral daily  aspirin enteric coated 81 milliGRAM(s) Oral daily  atorvastatin 80 milliGRAM(s) Oral at bedtime  carvedilol 25 milliGRAM(s) Oral every 12 hours  clopidogrel Tablet 75 milliGRAM(s) Oral daily  dextrose 5%. 1000 milliLiter(s) (50 mL/Hr) IV Continuous <Continuous>  dextrose 5%. 1000 milliLiter(s) (100 mL/Hr) IV Continuous <Continuous>  dextrose 50% Injectable 25 Gram(s) IV Push once  dextrose 50% Injectable 12.5 Gram(s) IV Push once  dextrose 50% Injectable 25 Gram(s) IV Push once  dextrose Oral Gel 15 Gram(s) Oral once  gabapentin 300 milliGRAM(s) Oral two times a day  glucagon  Injectable 1 milliGRAM(s) IntraMuscular once  heparin   Injectable 5000 Unit(s) SubCutaneous every 12 hours  insulin glargine Injectable (LANTUS) 7 Unit(s) SubCutaneous at bedtime  insulin lispro (ADMELOG) corrective regimen sliding scale   SubCutaneous three times a day before meals  insulin lispro (ADMELOG) corrective regimen sliding scale   SubCutaneous at bedtime  insulin lispro Injectable (ADMELOG) 3 Unit(s) SubCutaneous three times a day before meals  lidocaine/prilocaine Cream 1 Application(s) Topical <User Schedule>  tamsulosin 0.4 milliGRAM(s) Oral at bedtime  valsartan 80 milliGRAM(s) Oral daily    MEDICATIONS  (PRN):      Vital Signs Last 24 Hrs  T(C): 36.7 (07 Nov 2022 04:52), Max: 37.1 (06 Nov 2022 11:23)  T(F): 98 (07 Nov 2022 04:52), Max: 98.7 (06 Nov 2022 11:23)  HR: 73 (07 Nov 2022 04:52) (68 - 80)  BP: 135/74 (07 Nov 2022 04:52) (109/66 - 153/68)  BP(mean): --  RR: 18 (07 Nov 2022 04:52) (18 - 18)  SpO2: 99% (07 Nov 2022 04:52) (92% - 99%)    Parameters below as of 07 Nov 2022 04:52  Patient On (Oxygen Delivery Method): room air      CAPILLARY BLOOD GLUCOSE      POCT Blood Glucose.: 115 mg/dL (07 Nov 2022 08:31)  POCT Blood Glucose.: 130 mg/dL (06 Nov 2022 21:22)  POCT Blood Glucose.: 164 mg/dL (06 Nov 2022 17:27)  POCT Blood Glucose.: 153 mg/dL (06 Nov 2022 12:54)    I&O's Summary    06 Nov 2022 07:01  -  07 Nov 2022 07:00  --------------------------------------------------------  IN: 820 mL / OUT: 450 mL / NET: 370 mL        PHYSICAL EXAM:  HEAD:  Atraumatic, Normocephalic  NECK: Supple, No   JVD  CHEST/LUNG:   no     rales,     no,    rhonchi  HEART: Regular rate and rhythm;         murmur  ABDOMEN: Soft, Nontender, ;   EXTREMITIES:     no   edema  NEUROLOGY:  alert    LABS:                                  Consultant(s) Notes Reviewed:      Care Discussed with Consultants/Other Providers:

## 2022-11-07 NOTE — PROGRESS NOTE ADULT - SUBJECTIVE AND OBJECTIVE BOX
NEPHROLOGY[facility] [phone]        Patient seen and examined in bed.  He was the same         MEDICATIONS  (STANDING):  allopurinol 100 milliGRAM(s) Oral daily  aspirin enteric coated 81 milliGRAM(s) Oral daily  atorvastatin 80 milliGRAM(s) Oral at bedtime  carvedilol 25 milliGRAM(s) Oral every 12 hours  clopidogrel Tablet 75 milliGRAM(s) Oral daily  dextrose 5%. 1000 milliLiter(s) (50 mL/Hr) IV Continuous <Continuous>  dextrose 5%. 1000 milliLiter(s) (100 mL/Hr) IV Continuous <Continuous>  dextrose 50% Injectable 25 Gram(s) IV Push once  dextrose 50% Injectable 12.5 Gram(s) IV Push once  dextrose 50% Injectable 25 Gram(s) IV Push once  dextrose Oral Gel 15 Gram(s) Oral once  gabapentin 300 milliGRAM(s) Oral two times a day  glucagon  Injectable 1 milliGRAM(s) IntraMuscular once  heparin   Injectable 5000 Unit(s) SubCutaneous every 12 hours  insulin glargine Injectable (LANTUS) 7 Unit(s) SubCutaneous at bedtime  insulin lispro (ADMELOG) corrective regimen sliding scale   SubCutaneous three times a day before meals  insulin lispro (ADMELOG) corrective regimen sliding scale   SubCutaneous at bedtime  insulin lispro Injectable (ADMELOG) 3 Unit(s) SubCutaneous three times a day before meals  lidocaine/prilocaine Cream 1 Application(s) Topical <User Schedule>  tamsulosin 0.4 milliGRAM(s) Oral at bedtime  valsartan 80 milliGRAM(s) Oral daily      VITAL:  T(C): , Max: 37.1 (11-06-22 @ 11:23)  T(F): , Max: 98.7 (11-06-22 @ 11:23)  HR: 73 (11-07-22 @ 04:52)  BP: 135/74 (11-07-22 @ 04:52)  BP(mean): --  RR: 18 (11-07-22 @ 04:52)  SpO2: 99% (11-07-22 @ 04:52)  Wt(kg): --    I and O's:    11-06 @ 07:01  -  11-07 @ 07:00  --------------------------------------------------------  IN: 820 mL / OUT: 450 mL / NET: 370 mL          PHYSICAL EXAM:    Constitutional: NAD  Neck:  No JVD  Respiratory: CTAB/L  Cardiovascular: S1 and S2  Gastrointestinal: BS+, soft, NT/ND  Extremities: No peripheral edema  Neurological:   no focal deficits  Psychiatric:   : No Jay  Skin: No rashes  Access: avf    LABS:                Urine Studies:          RADIOLOGY & ADDITIONAL STUDIES:

## 2022-11-07 NOTE — PROGRESS NOTE ADULT - ASSESSMENT
Assessment  DMT2: 62y Male with DM T2 with hyperglycemia admitted with weakness, was on insulin at home, now on low-dose basal bolus insulin, blood sugars trending within overall acceptable range, no new hypoglycemias. Patient is s/p PCI, eats with inconsistent intake, appears comfortable in NAD, pending DC to rehab.  CAD: on medications, monitored, asymptomatic.  HTN: On antihypertensive medications, monitored, asymptomatic.  CKD:  labs, chart reviewed.          Rubén Escoto MD  Cell:  [phone]  Office:     Rachel SHORT  Cell:   Office:                Assessment  DMT2: 62y Male with DM T2 with hyperglycemia admitted with weakness, was on insulin at home, now on low-dose basal bolus insulin, blood sugars trending within overall acceptable range, no new hypoglycemias. Patient is  s/p PCI, eats with inconsistent intake, appears comfortable in NAD, pending DC to rehab.  CAD: on medications, monitored, asymptomatic.  HTN: On antihypertensive medications, monitored, asymptomatic.  CKD:  labs, chart reviewed.          Rubén Escoto MD  Cell:  [phone]  Office:     Rachel SHORT  Cell:   Office:

## 2022-11-07 NOTE — PROGRESS NOTE ADULT - SUBJECTIVE AND OBJECTIVE BOX
Chief complaint  Patient is a 62y old  Male who presents with a chief complaint of fevers (07 Nov 2022 09:43)   Review of systems  Patient in bed, looks comfortable, no hypoglycemic episodes.    Labs and Fingersticks  CAPILLARY BLOOD GLUCOSE      POCT Blood Glucose.: 115 mg/dL (07 Nov 2022 08:31)  POCT Blood Glucose.: 130 mg/dL (06 Nov 2022 21:22)  POCT Blood Glucose.: 164 mg/dL (06 Nov 2022 17:27)  POCT Blood Glucose.: 153 mg/dL (06 Nov 2022 12:54)                        Medications  MEDICATIONS  (STANDING):  allopurinol 100 milliGRAM(s) Oral daily  aspirin enteric coated 81 milliGRAM(s) Oral daily  atorvastatin 80 milliGRAM(s) Oral at bedtime  carvedilol 25 milliGRAM(s) Oral every 12 hours  clopidogrel Tablet 75 milliGRAM(s) Oral daily  dextrose 5%. 1000 milliLiter(s) (50 mL/Hr) IV Continuous <Continuous>  dextrose 5%. 1000 milliLiter(s) (100 mL/Hr) IV Continuous <Continuous>  dextrose 50% Injectable 25 Gram(s) IV Push once  dextrose 50% Injectable 12.5 Gram(s) IV Push once  dextrose 50% Injectable 25 Gram(s) IV Push once  dextrose Oral Gel 15 Gram(s) Oral once  gabapentin 300 milliGRAM(s) Oral two times a day  glucagon  Injectable 1 milliGRAM(s) IntraMuscular once  heparin   Injectable 5000 Unit(s) SubCutaneous every 12 hours  insulin glargine Injectable (LANTUS) 7 Unit(s) SubCutaneous at bedtime  insulin lispro (ADMELOG) corrective regimen sliding scale   SubCutaneous three times a day before meals  insulin lispro (ADMELOG) corrective regimen sliding scale   SubCutaneous at bedtime  insulin lispro Injectable (ADMELOG) 3 Unit(s) SubCutaneous three times a day before meals  lidocaine/prilocaine Cream 1 Application(s) Topical <User Schedule>  tamsulosin 0.4 milliGRAM(s) Oral at bedtime  valsartan 80 milliGRAM(s) Oral daily      Physical Exam  General: Patient comfortable in bed  Vital Signs Last 12 Hrs  T(F): 98.3 (11-07-22 @ 11:27), Max: 98.3 (11-07-22 @ 11:27)  HR: 73 (11-07-22 @ 11:27) (73 - 73)  BP: 130/75 (11-07-22 @ 11:27) (130/75 - 135/74)  BP(mean): --  RR: 18 (11-07-22 @ 11:27) (18 - 18)  SpO2: 94% (11-07-22 @ 11:27) (94% - 99%)  Neck: No palpable thyroid nodules.  CVS: S1S2, No murmurs  Respiratory: No wheezing, no crepitations  GI: Abdomen soft, bowel sounds positive  Musculoskeletal:  edema lower extremities.   Skin: No skin rashes, no ecchymosis    Diagnostics    C-Peptide, Serum: Routine (11-03 @ 10:45)           Chief complaint  Patient is a 62y old  Male who presents with a chief complaint of fevers (07 Nov 2022 09:43)   Review of systems  Patient in bed, looks comfortable, no hypoglycemic episodes.    Labs and Fingersticks  CAPILLARY BLOOD GLUCOSE      POCT Blood Glucose.: 115 mg/dL (07 Nov 2022 08:31)  POCT Blood Glucose.: 130 mg/dL (06 Nov 2022 21:22)  POCT Blood Glucose.: 164 mg/dL (06 Nov 2022 17:27)  POCT Blood Glucose.: 153 mg/dL (06 Nov 2022 12:54)    Medications  MEDICATIONS  (STANDING):  allopurinol 100 milliGRAM(s) Oral daily  aspirin enteric coated 81 milliGRAM(s) Oral daily  atorvastatin 80 milliGRAM(s) Oral at bedtime  carvedilol 25 milliGRAM(s) Oral every 12 hours  clopidogrel Tablet 75 milliGRAM(s) Oral daily  dextrose 5%. 1000 milliLiter(s) (50 mL/Hr) IV Continuous <Continuous>  dextrose 5%. 1000 milliLiter(s) (100 mL/Hr) IV Continuous <Continuous>  dextrose 50% Injectable 25 Gram(s) IV Push once  dextrose 50% Injectable 12.5 Gram(s) IV Push once  dextrose 50% Injectable 25 Gram(s) IV Push once  dextrose Oral Gel 15 Gram(s) Oral once  gabapentin 300 milliGRAM(s) Oral two times a day  glucagon  Injectable 1 milliGRAM(s) IntraMuscular once  heparin   Injectable 5000 Unit(s) SubCutaneous every 12 hours  insulin glargine Injectable (LANTUS) 7 Unit(s) SubCutaneous at bedtime  insulin lispro (ADMELOG) corrective regimen sliding scale   SubCutaneous three times a day before meals  insulin lispro (ADMELOG) corrective regimen sliding scale   SubCutaneous at bedtime  insulin lispro Injectable (ADMELOG) 3 Unit(s) SubCutaneous three times a day before meals  lidocaine/prilocaine Cream 1 Application(s) Topical <User Schedule>  tamsulosin 0.4 milliGRAM(s) Oral at bedtime  valsartan 80 milliGRAM(s) Oral daily      Physical Exam  General: Patient comfortable in bed  Vital Signs Last 12 Hrs  T(F): 98.3 (11-07-22 @ 11:27), Max: 98.3 (11-07-22 @ 11:27)  HR: 73 (11-07-22 @ 11:27) (73 - 73)  BP: 130/75 (11-07-22 @ 11:27) (130/75 - 135/74)  BP(mean): --  RR: 18 (11-07-22 @ 11:27) (18 - 18)  SpO2: 94% (11-07-22 @ 11:27) (94% - 99%)  Neck: No palpable thyroid nodules.  CVS: S1S2, No murmurs  Respiratory: No wheezing, no crepitations  GI: Abdomen soft, bowel sounds positive  Musculoskeletal:  edema lower extremities.   Skin: No skin rashes, no ecchymosis    Diagnostics    C-Peptide, Serum: Routine (11-03 @ 10:45)

## 2022-11-07 NOTE — PROGRESS NOTE ADULT - ASSESSMENT
ASSESSMENT:  (1)CKD - stage 5 - diabetic nephropathy  (2)LEYDA - prerenally mediated  (3)Fatigue/weakness - improved   (4)Triple vessel disease     RECOMMEND:  (1)HD today. Outpt HD center was already set up   (2)SP Cardiac stent and DAPT for 6 months    (3)Valsartan increased for tighter bp control  (4)Dose new meds for GFR <10     Sayed [facility]  [phone]    ASSESSMENT:  (1)CKD - stage 5 - diabetic nephropathy  (2)LEYDA - prerenally mediated  (3)Fatigue/weakness - improved   (4)Triple vessel disease     RECOMMEND:  (1)HD in am. Outpt HD center was already set up but wife wants [facility]  (2)SP Cardiac stent and DAPT for 6 months    (3)Valsartan increased for tighter bp control  (4)Dose new meds for GFR <10     Sayed Joan   [facility]  [phone]

## 2022-11-07 NOTE — PROGRESS NOTE ADULT - ASSESSMENT
2 yom   hx of CKD,   s/p DKA, DM.     cardiomyopathy,  CAD w/ stents, HTN, gout     generalized  weakness.  with   c/c h/o b/l leg weakness/ and   b/l arm weakness, r > l      p/w days of generalized weakness and AMS    per wife, pt was just in [facility] for DKA.  and  was d/c'd w/ fever and still weak.     more lethargic and altered especially when he waking up in morning/ glucose,  have been in the 200s            *   pt  admitted  with fevers/ ams. / metabolic  encephalopathy   on  arrival, wbc  was12,000       bcx/ ucx,  was negative      no source of infection found        was on   Zosyn  ,  Ct  a/p,   ? cystitis/   ID eval dr harrington    * CKD,  crt is 5,4/ ,  renal  dr dickerson     *   c/c  anemia     *   DM,  follow  fs        on lantus/  known to  [facility]     *  ef 40     *   c/c  weakness  of   limbs/  neuro d rajeev mccain       has  functional  quadriplegia/  CT  head, . old  arachnoid  cyst/  no intervention      *    acute  gout, right  wrist, on  prednisone/ colchicine     on dvt  ppx     MRI  head/  C  spine.  old  infarcts    much improved, more  alert/  spoke  with wife/     wife  wants  rehab/,  unable  to take care of  pt     glucos e noted,  stopped  admelog/  follow fs/ pt  is  a  finnicky  eater     echo  from 10/28.  ef  30/   s/p cath, cad.  LAD/ RCA/ pt   had refuse d intervention     staged  PCI on 11/2  and on 11/3   to  RCA  and  LAD      cleared   for   d/c     awiating  d/ c to rehab,  doing well/  d/c order written           rad< from: CT Abdomen and Pelvis No Cont (10.22.22 @ 15:40) >  IMPRESSION:  Mild bladder wall thickening. Correlate with urinalysis  --- End of Report ---  < end of copied text >      rad< from: Transthoracic Echocardiogram (12.07.21 @ 11:24) >  nclusions:  1. Normal mitral valve. Minimal mitral regurgitation.  2. Calcified trileaflet aortic valve with normal opening.  Minimal aortic regurgitation.  3. Mild segmental left ventricular systolic dysfunction.  The basal inferior, basal septum, distal septum, distal  inferior, and the apex are akinetic. Recommend limited  repeat imaging with intravenous echo contrast to better  visualize the apex. (Per sonographer, patient did not agree  to echo contrast during the study.)  4. Mild diastolic dysfunction (Stage I).  5. Normal right ventricular size and function.  *** No previous Echo exam.    < end of copied text >

## 2022-11-08 LAB
GLUCOSE BLDC GLUCOMTR-MCNC: 120 MG/DL — HIGH (ref 70–99)
GLUCOSE BLDC GLUCOMTR-MCNC: 152 MG/DL — HIGH (ref 70–99)
GLUCOSE BLDC GLUCOMTR-MCNC: 164 MG/DL — HIGH (ref 70–99)
GLUCOSE BLDC GLUCOMTR-MCNC: 222 MG/DL — HIGH (ref 70–99)
GLUCOSE BLDC GLUCOMTR-MCNC: 96 MG/DL — SIGNIFICANT CHANGE UP (ref 70–99)

## 2022-11-08 RX ADMIN — HEPARIN SODIUM 5000 UNIT(S): 5000 INJECTION INTRAVENOUS; SUBCUTANEOUS at 18:08

## 2022-11-08 RX ADMIN — HEPARIN SODIUM 5000 UNIT(S): 5000 INJECTION INTRAVENOUS; SUBCUTANEOUS at 05:31

## 2022-11-08 RX ADMIN — ATORVASTATIN CALCIUM 80 MILLIGRAM(S): 80 TABLET, FILM COATED ORAL at 22:00

## 2022-11-08 RX ADMIN — CARVEDILOL PHOSPHATE 25 MILLIGRAM(S): 80 CAPSULE, EXTENDED RELEASE ORAL at 18:07

## 2022-11-08 RX ADMIN — GABAPENTIN 300 MILLIGRAM(S): 400 CAPSULE ORAL at 05:31

## 2022-11-08 RX ADMIN — CARVEDILOL PHOSPHATE 25 MILLIGRAM(S): 80 CAPSULE, EXTENDED RELEASE ORAL at 05:31

## 2022-11-08 RX ADMIN — Medication 3 UNIT(S): at 18:08

## 2022-11-08 RX ADMIN — INSULIN GLARGINE 7 UNIT(S): 100 INJECTION, SOLUTION SUBCUTANEOUS at 22:00

## 2022-11-08 RX ADMIN — VALSARTAN 80 MILLIGRAM(S): 80 TABLET ORAL at 05:31

## 2022-11-08 RX ADMIN — TAMSULOSIN HYDROCHLORIDE 0.4 MILLIGRAM(S): 0.4 CAPSULE ORAL at 21:59

## 2022-11-08 RX ADMIN — Medication 2: at 09:03

## 2022-11-08 RX ADMIN — GABAPENTIN 300 MILLIGRAM(S): 400 CAPSULE ORAL at 18:08

## 2022-11-08 RX ADMIN — Medication 3 UNIT(S): at 09:04

## 2022-11-08 NOTE — PROGRESS NOTE ADULT - SUBJECTIVE AND OBJECTIVE BOX
Chief complaint  Patient is a 62y old  Male who presents with a chief complaint of fevers (08 Nov 2022 09:46)   Review of systems  Patient in bed, looks comfortable, no hypoglycemic episodes.    Labs and Fingersticks  CAPILLARY BLOOD GLUCOSE      POCT Blood Glucose.: 222 mg/dL (08 Nov 2022 08:53)  POCT Blood Glucose.: 154 mg/dL (07 Nov 2022 21:29)  POCT Blood Glucose.: 151 mg/dL (07 Nov 2022 17:10)  POCT Blood Glucose.: 204 mg/dL (07 Nov 2022 12:45)                                            9.3    9.09  )-----------( 190      ( 07 Nov 2022 15:20 )             30.1     Medications  MEDICATIONS  (STANDING):  allopurinol 100 milliGRAM(s) Oral daily  aspirin enteric coated 81 milliGRAM(s) Oral daily  atorvastatin 80 milliGRAM(s) Oral at bedtime  carvedilol 25 milliGRAM(s) Oral every 12 hours  clopidogrel Tablet 75 milliGRAM(s) Oral daily  dextrose 5%. 1000 milliLiter(s) (50 mL/Hr) IV Continuous <Continuous>  dextrose 5%. 1000 milliLiter(s) (100 mL/Hr) IV Continuous <Continuous>  dextrose 50% Injectable 25 Gram(s) IV Push once  dextrose 50% Injectable 12.5 Gram(s) IV Push once  dextrose 50% Injectable 25 Gram(s) IV Push once  dextrose Oral Gel 15 Gram(s) Oral once  epoetin wayne-epbx (RETACRIT) Injectable 4000 Unit(s) IV Push once  gabapentin 300 milliGRAM(s) Oral two times a day  glucagon  Injectable 1 milliGRAM(s) IntraMuscular once  heparin   Injectable 5000 Unit(s) SubCutaneous every 12 hours  insulin glargine Injectable (LANTUS) 7 Unit(s) SubCutaneous at bedtime  insulin lispro (ADMELOG) corrective regimen sliding scale   SubCutaneous three times a day before meals  insulin lispro (ADMELOG) corrective regimen sliding scale   SubCutaneous at bedtime  insulin lispro Injectable (ADMELOG) 3 Unit(s) SubCutaneous three times a day before meals  lidocaine/prilocaine Cream 1 Application(s) Topical <User Schedule>  tamsulosin 0.4 milliGRAM(s) Oral at bedtime  valsartan 80 milliGRAM(s) Oral daily      Physical Exam  General: Patient comfortable in bed  Vital Signs Last 12 Hrs  T(F): 98.6 (11-08-22 @ 12:06), Max: 98.6 (11-08-22 @ 12:06)  HR: 79 (11-08-22 @ 12:06) (74 - 79)  BP: 156/70 (11-08-22 @ 12:06) (156/70 - 165/84)  BP(mean): 98 (11-08-22 @ 12:06) (98 - 98)  RR: 18 (11-08-22 @ 12:06) (18 - 18)  SpO2: 97% (11-08-22 @ 12:06) (96% - 97%)         Chief complaint  Patient is a 62y old  Male who presents with a chief complaint of fevers (08 Nov 2022 09:46)   Review of systems  Patient in bed, looks comfortable, no hypoglycemic episodes.    Labs and Fingersticks  CAPILLARY BLOOD GLUCOSE      POCT Blood Glucose.: 222 mg/dL (08 Nov 2022 08:53)  POCT Blood Glucose.: 154 mg/dL (07 Nov 2022 21:29)  POCT Blood Glucose.: 151 mg/dL (07 Nov 2022 17:10)  POCT Blood Glucose.: 204 mg/dL (07 Nov 2022 12:45)                        9.3    9.09  )-----------( 190      ( 07 Nov 2022 15:20 )             30.1     Medications  MEDICATIONS  (STANDING):  allopurinol 100 milliGRAM(s) Oral daily  aspirin enteric coated 81 milliGRAM(s) Oral daily  atorvastatin 80 milliGRAM(s) Oral at bedtime  carvedilol 25 milliGRAM(s) Oral every 12 hours  clopidogrel Tablet 75 milliGRAM(s) Oral daily  dextrose 5%. 1000 milliLiter(s) (50 mL/Hr) IV Continuous <Continuous>  dextrose 5%. 1000 milliLiter(s) (100 mL/Hr) IV Continuous <Continuous>  dextrose 50% Injectable 25 Gram(s) IV Push once  dextrose 50% Injectable 12.5 Gram(s) IV Push once  dextrose 50% Injectable 25 Gram(s) IV Push once  dextrose Oral Gel 15 Gram(s) Oral once  epoetin wayne-epbx (RETACRIT) Injectable 4000 Unit(s) IV Push once  gabapentin 300 milliGRAM(s) Oral two times a day  glucagon  Injectable 1 milliGRAM(s) IntraMuscular once  heparin   Injectable 5000 Unit(s) SubCutaneous every 12 hours  insulin glargine Injectable (LANTUS) 7 Unit(s) SubCutaneous at bedtime  insulin lispro (ADMELOG) corrective regimen sliding scale   SubCutaneous three times a day before meals  insulin lispro (ADMELOG) corrective regimen sliding scale   SubCutaneous at bedtime  insulin lispro Injectable (ADMELOG) 3 Unit(s) SubCutaneous three times a day before meals  lidocaine/prilocaine Cream 1 Application(s) Topical <User Schedule>  tamsulosin 0.4 milliGRAM(s) Oral at bedtime  valsartan 80 milliGRAM(s) Oral daily      Physical Exam  General: Patient comfortable in bed  Vital Signs Last 12 Hrs  T(F): 98.6 (11-08-22 @ 12:06), Max: 98.6 (11-08-22 @ 12:06)  HR: 79 (11-08-22 @ 12:06) (74 - 79)  BP: 156/70 (11-08-22 @ 12:06) (156/70 - 165/84)  BP(mean): 98 (11-08-22 @ 12:06) (98 - 98)  RR: 18 (11-08-22 @ 12:06) (18 - 18)  SpO2: 97% (11-08-22 @ 12:06) (96% - 97%)

## 2022-11-08 NOTE — PROGRESS NOTE ADULT - SUBJECTIVE AND OBJECTIVE BOX
CARDIOLOGY     PROGRESS  NOTE   ________________________________________________    CHIEF COMPLAINT:Patient is a 62y old  Male who presents with a chief complaint of fevers (07 Nov 2022 12:37)  no complain  	  REVIEW OF SYSTEMS:  CONSTITUTIONAL: No fever, weight loss, or fatigue  EYES: No eye pain, visual disturbances, or discharge  ENT:  No difficulty hearing, tinnitus, vertigo; No sinus or throat pain  NECK: No pain or stiffness  RESPIRATORY: No cough, wheezing, chills or hemoptysis; No Shortness of Breath  CARDIOVASCULAR: No chest pain, palpitations, passing out, dizziness, or leg swelling  GASTROINTESTINAL: No abdominal or epigastric pain. No nausea, vomiting, or hematemesis; No diarrhea or constipation. No melena or hematochezia.  GENITOURINARY: No dysuria, frequency, hematuria, or incontinence  NEUROLOGICAL: No headaches, memory loss, loss of strength, numbness, or tremors  SKIN: No itching, burning, rashes, or lesions   LYMPH Nodes: No enlarged glands  ENDOCRINE: No heat or cold intolerance; No hair loss  MUSCULOSKELETAL: No joint pain or swelling; No muscle, back, or extremity pain  PSYCHIATRIC: No depression, anxiety, mood swings, or difficulty sleeping  HEME/LYMPH: No easy bruising, or bleeding gums  ALLERGY AND IMMUNOLOGIC: No hives or eczema	    [ ] All others negative	  [ ] Unable to obtain    PHYSICAL EXAM:  T(C): 36.8 (11-08-22 @ 04:37), Max: 37 (11-07-22 @ 20:54)  HR: 74 (11-08-22 @ 04:37) (65 - 74)  BP: 165/84 (11-08-22 @ 04:37) (124/72 - 165/84)  RR: 18 (11-08-22 @ 04:37) (18 - 18)  SpO2: 96% (11-08-22 @ 04:37) (94% - 96%)  Wt(kg): --  I&O's Summary    07 Nov 2022 07:01  -  08 Nov 2022 07:00  --------------------------------------------------------  IN: 400 mL / OUT: 1050 mL / NET: -650 mL        Appearance: Normal	  HEENT:   Normal oral mucosa, PERRL, EOMI	  Lymphatic: No lymphadenopathy  Cardiovascular: Normal S1 S2, No JVD, + murmurs, No edema  Respiratory: rhonchi  Psychiatry: A & O x 3, Mood & affect appropriate  Gastrointestinal:  Soft, Non-tender, + BS	  Skin: No rashes, No ecchymoses, No cyanosis	  Neurologic: Non-focal  Extremities: Normal range of motion, No clubbing, cyanosis or edema  Vascular: Peripheral pulses palpable 2+ bilaterally    MEDICATIONS  (STANDING):  allopurinol 100 milliGRAM(s) Oral daily  aspirin enteric coated 81 milliGRAM(s) Oral daily  atorvastatin 80 milliGRAM(s) Oral at bedtime  carvedilol 25 milliGRAM(s) Oral every 12 hours  clopidogrel Tablet 75 milliGRAM(s) Oral daily  dextrose 5%. 1000 milliLiter(s) (50 mL/Hr) IV Continuous <Continuous>  dextrose 5%. 1000 milliLiter(s) (100 mL/Hr) IV Continuous <Continuous>  dextrose 50% Injectable 25 Gram(s) IV Push once  dextrose 50% Injectable 12.5 Gram(s) IV Push once  dextrose 50% Injectable 25 Gram(s) IV Push once  dextrose Oral Gel 15 Gram(s) Oral once  epoetin wayne-epbx (RETACRIT) Injectable 4000 Unit(s) IV Push once  gabapentin 300 milliGRAM(s) Oral two times a day  glucagon  Injectable 1 milliGRAM(s) IntraMuscular once  heparin   Injectable 5000 Unit(s) SubCutaneous every 12 hours  insulin glargine Injectable (LANTUS) 7 Unit(s) SubCutaneous at bedtime  insulin lispro (ADMELOG) corrective regimen sliding scale   SubCutaneous three times a day before meals  insulin lispro (ADMELOG) corrective regimen sliding scale   SubCutaneous at bedtime  insulin lispro Injectable (ADMELOG) 3 Unit(s) SubCutaneous three times a day before meals  lidocaine/prilocaine Cream 1 Application(s) Topical <User Schedule>  tamsulosin 0.4 milliGRAM(s) Oral at bedtime  valsartan 80 milliGRAM(s) Oral daily      TELEMETRY: 	    ECG:  	  RADIOLOGY:  OTHER: 	  	  LABS:	 	    CARDIAC MARKERS:                                9.3    9.09  )-----------( 190      ( 07 Nov 2022 15:20 )             30.1           proBNP:   Lipid Profile:   HgA1c:   TSH:         Assessment and plan  ---------------------------  62 yom hx of CKD, DKA, DMtype2, cardiomyopathy w/ stents, HTN, gout p/w days of generalized weakness and AMS. Per wife, pt was just in [facility] for DKA. Was d/c'd w/ fever and still weak. Since tuesday, pt becoming more lethargic and altered especially when he waking up in morning. BG have been in the 200s since per cont BG monitor. Subjective fevers at home, wife feels that his abd is larger than it was before d/c from [facility]. Question of if pt has heather compliant w/ short and long acting insulin as wife works nights and is not there to give all meds at home. Unclear if pt has been falling at home but w/ bruising on his abd.   pt with hx of ashd, chf, cardiomyopathy, dm, s/p CARDIOMEM fu at [facility] with change of mental stratus and sob.  ct scan noted , no active chf  will adjust cardiac meds  will call chf team at [facility] to get Cardiomem readings  beta blocker/ hydralazine and Nitrate  ckd, renal follow up  check pro bnp  dvt prophylaxis  pt with known hx of cad, last stress test  in 07/2021, no ischemia with scar in mary septal and apical area  ct chest abdomen and pelvis noted, no pleural effusion or sign of chf  renal increased renal function last admission CR 4.8  continue asa daily /coreg will add ace as pt on HD  avoid excess fluid, may dc ivf, hx of chf , EF40%  s/p HD yesterday  dc colchicine  MRI noted may consider thoracic MRI  start on ARB while on HD, repeat echo noted with sig lv dysfunction and wall motion abnormality  increase fluid withrawal with increase LV pressure  increase valsartan as tolertaed  s/p stent yesterday , now with second stent today px LAD  increase valsartan for better bp control  HDcontinue DAPT for 6 months, then will switch asa daily  dc planning  increase valsartan

## 2022-11-08 NOTE — PROGRESS NOTE ADULT - ASSESSMENT
ASSESSMENT:  (1)CKD - stage 5 and now on HD   (2)Fatigue/weakness - improved   (3)Triple vessel disease sp stent     RECOMMEND:  (1)HD today   (2)SP Cardiac stent and DAPT for 6 months    (3)Valsartan increased for tighter bp control and may need to be increased if still high p HD   (4)Dose new meds for GFR <10     Sayed Joan   [facility]  [phone]

## 2022-11-08 NOTE — PROGRESS NOTE ADULT - ASSESSMENT
Assessment  DMT2: 62y Male with DM T2 with hyperglycemia admitted with weakness, was on insulin at home, now on low-dose basal bolus insulin, blood sugars are trending within overall acceptable range with intermittent elevations, no new hypoglycemias. Patient is s/p PCI, eating meals with inconsistent intake, appears comfortable in NAD, pending DC to rehab.  CAD: on medications, monitored, asymptomatic.  HTN: On antihypertensive medications, monitored, asymptomatic.  CKD:  labs, chart reviewed.          Rubén Escoto MD  Cell:  [phone]  Office: [phone]    Rachel SHORT  Cell:   Office: [phone]               Assessment  DMT2: 62y Male with DM T2 with hyperglycemia admitted with weakness, was on insulin at home, now on low-dose basal bolus insulin, blood sugars are trending within overall acceptable range with intermittent elevations,  no new hypoglycemias. Patient is s/p PCI, eating meals with inconsistent intake, appears comfortable in NAD, pending DC to rehab.  CAD: on medications, monitored, asymptomatic.  HTN: On antihypertensive medications, monitored, asymptomatic.  CKD:  labs, chart reviewed.          Rubén Escoto MD  Cell:  [phone]  Office: [phone]    Rachel SHORT  Cell:   Office: [phone]

## 2022-11-08 NOTE — PROGRESS NOTE ADULT - ASSESSMENT
2 yom   hx of CKD,   s/p DKA, DM.     cardiomyopathy,  CAD w/ stents, HTN, gout     generalized  weakness.  with   c/c h/o b/l leg weakness/ and   b/l arm weakness, r > l      p/w days of generalized weakness and AMS    per wife, pt was just in [facility] for DKA.  and  was d/c'd w/ fever and still weak.     more lethargic and altered especially when he waking up in morning/ glucose,  have been in the 200s            *   pt  admitted  with fevers/ ams. / metabolic  encephalopathy   on  arrival, wbc  was12,000       bcx/ ucx,  was negative      no source of infection found        was on   Zosyn  ,  Ct  a/p,   ? cystitis/   ID eval dr harrington    * CKD,  crt is 5,4/ ,  renal  dr dickerson     *   c/c  anemia     *   DM,  follow  fs        on lantus/  known to  [facility]     *  ef 40     *   c/c  weakness  of   limbs/  neuro d rajeev mccain       has  functional  quadriplegia/  CT  head, . old  arachnoid  cyst/  no intervention      *    acute  gout, right  wrist, on  prednisone/ colchicine     on dvt  ppx     MRI  head/  C  spine.  old  infarcts    much improved, more  alert/  spoke  with wife/     wife  wants  rehab/,  unable  to take care of  pt     glucos e noted,  stopped  admelog/  follow fs/ pt  is  a  finnicky  eater     echo  from 10/28.  ef  30/   s/p cath, cad.  LAD/ RCA/ pt   had refuse d intervention     staged  PCI on 11/2  and on 11/3   to  RCA  and  LAD      cleared   for   d/c     awiating  d/c  to rehab,  doing well/  d/c order written   spoke   to wife/ rteam awrae           rad< from: CT Abdomen and Pelvis No Cont (10.22.22 @ 15:40) >  IMPRESSION:  Mild bladder wall thickening. Correlate with urinalysis  --- End of Report ---  < end of copied text >      rad< from: Transthoracic Echocardiogram (12.07.21 @ 11:24) >  nclusions:  1. Normal mitral valve. Minimal mitral regurgitation.  2. Calcified trileaflet aortic valve with normal opening.  Minimal aortic regurgitation.  3. Mild segmental left ventricular systolic dysfunction.  The basal inferior, basal septum, distal septum, distal  inferior, and the apex are akinetic. Recommend limited  repeat imaging with intravenous echo contrast to better  visualize the apex. (Per sonographer, patient did not agree  to echo contrast during the study.)  4. Mild diastolic dysfunction (Stage I).  5. Normal right ventricular size and function.  *** No previous Echo exam.    < end of copied text >

## 2022-11-08 NOTE — PROGRESS NOTE ADULT - SUBJECTIVE AND OBJECTIVE BOX
[facility]    REVIEW OF SYSTEMS:  GEN: no fever,    no chills  RESP: no SOB,   no cough  CVS: no chest pain,   no palpitations  GI: no abdominal pain,   no nausea,   no vomiting,   no constipation,   no diarrhea  : no dysuria,   no frequency  NEURO: no headache,   no dizziness  PSYCH: no depression,   not anxious  Derm : no rash    MEDICATIONS  (STANDING):  allopurinol 100 milliGRAM(s) Oral daily  aspirin enteric coated 81 milliGRAM(s) Oral daily  atorvastatin 80 milliGRAM(s) Oral at bedtime  carvedilol 25 milliGRAM(s) Oral every 12 hours  clopidogrel Tablet 75 milliGRAM(s) Oral daily  dextrose 5%. 1000 milliLiter(s) (50 mL/Hr) IV Continuous <Continuous>  dextrose 5%. 1000 milliLiter(s) (100 mL/Hr) IV Continuous <Continuous>  dextrose 50% Injectable 25 Gram(s) IV Push once  dextrose 50% Injectable 12.5 Gram(s) IV Push once  dextrose 50% Injectable 25 Gram(s) IV Push once  dextrose Oral Gel 15 Gram(s) Oral once  epoetin wayne-epbx (RETACRIT) Injectable 4000 Unit(s) IV Push once  gabapentin 300 milliGRAM(s) Oral two times a day  glucagon  Injectable 1 milliGRAM(s) IntraMuscular once  heparin   Injectable 5000 Unit(s) SubCutaneous every 12 hours  insulin glargine Injectable (LANTUS) 7 Unit(s) SubCutaneous at bedtime  insulin lispro (ADMELOG) corrective regimen sliding scale   SubCutaneous three times a day before meals  insulin lispro (ADMELOG) corrective regimen sliding scale   SubCutaneous at bedtime  insulin lispro Injectable (ADMELOG) 3 Unit(s) SubCutaneous three times a day before meals  lidocaine/prilocaine Cream 1 Application(s) Topical <User Schedule>  tamsulosin 0.4 milliGRAM(s) Oral at bedtime  valsartan 80 milliGRAM(s) Oral daily    MEDICATIONS  (PRN):  acetaminophen     Tablet .. 650 milliGRAM(s) Oral every 6 hours PRN Mild Pain (1 - 3), Moderate Pain (4 - 6)      Vital Signs Last 24 Hrs  T(C): 36.8 (08 Nov 2022 04:37), Max: 37 (07 Nov 2022 20:54)  T(F): 98.2 (08 Nov 2022 04:37), Max: 98.6 (07 Nov 2022 20:54)  HR: 74 (08 Nov 2022 04:37) (65 - 74)  BP: 165/84 (08 Nov 2022 04:37) (124/72 - 165/84)  BP(mean): --  RR: 18 (08 Nov 2022 04:37) (18 - 18)  SpO2: 96% (08 Nov 2022 04:37) (94% - 96%)    Parameters below as of 08 Nov 2022 04:37  Patient On (Oxygen Delivery Method): room air      CAPILLARY BLOOD GLUCOSE      POCT Blood Glucose.: 222 mg/dL (08 Nov 2022 08:53)  POCT Blood Glucose.: 154 mg/dL (07 Nov 2022 21:29)  POCT Blood Glucose.: 151 mg/dL (07 Nov 2022 17:10)  POCT Blood Glucose.: 204 mg/dL (07 Nov 2022 12:45)    I&O's Summary    07 Nov 2022 07:01  -  08 Nov 2022 07:00  --------------------------------------------------------  IN: 400 mL / OUT: 1050 mL / NET: -650 mL        PHYSICAL EXAM:  HEAD:  Atraumatic, Normocephalic  NECK: Supple, No   JVD  CHEST/LUNG:   no     rales,     no,    rhonchi  HEART: Regular rate and rhythm;         murmur  ABDOMEN: Soft, Nontender, ;   EXTREMITIES:  no      edema  NEUROLOGY:  alert    LABS:                        9.3    9.09  )-----------( 190      ( 07 Nov 2022 15:20 )             30.1                                   Consultant(s) Notes Reviewed:      Care Discussed with Consultants/Other Providers:

## 2022-11-08 NOTE — PROGRESS NOTE ADULT - SUBJECTIVE AND OBJECTIVE BOX
[facility] [phone]        Patient seen and examined in bed.  He was the same         MEDICATIONS  (STANDING):  allopurinol 100 milliGRAM(s) Oral daily  aspirin enteric coated 81 milliGRAM(s) Oral daily  atorvastatin 80 milliGRAM(s) Oral at bedtime  carvedilol 25 milliGRAM(s) Oral every 12 hours  clopidogrel Tablet 75 milliGRAM(s) Oral daily  dextrose 5%. 1000 milliLiter(s) (50 mL/Hr) IV Continuous <Continuous>  dextrose 5%. 1000 milliLiter(s) (100 mL/Hr) IV Continuous <Continuous>  dextrose 50% Injectable 25 Gram(s) IV Push once  dextrose 50% Injectable 12.5 Gram(s) IV Push once  dextrose 50% Injectable 25 Gram(s) IV Push once  dextrose Oral Gel 15 Gram(s) Oral once  epoetin wayne-epbx (RETACRIT) Injectable 4000 Unit(s) IV Push once  gabapentin 300 milliGRAM(s) Oral two times a day  glucagon  Injectable 1 milliGRAM(s) IntraMuscular once  heparin   Injectable 5000 Unit(s) SubCutaneous every 12 hours  insulin glargine Injectable (LANTUS) 7 Unit(s) SubCutaneous at bedtime  insulin lispro (ADMELOG) corrective regimen sliding scale   SubCutaneous three times a day before meals  insulin lispro (ADMELOG) corrective regimen sliding scale   SubCutaneous at bedtime  insulin lispro Injectable (ADMELOG) 3 Unit(s) SubCutaneous three times a day before meals  lidocaine/prilocaine Cream 1 Application(s) Topical <User Schedule>  tamsulosin 0.4 milliGRAM(s) Oral at bedtime  valsartan 80 milliGRAM(s) Oral daily      VITAL:  T(C): , Max: 37 (11-07-22 @ 20:54)  T(F): , Max: 98.6 (11-07-22 @ 20:54)  HR: 74 (11-08-22 @ 04:37)  BP: 165/84 (11-08-22 @ 04:37)  BP(mean): --  RR: 18 (11-08-22 @ 04:37)  SpO2: 96% (11-08-22 @ 04:37)  Wt(kg): --    I and O's:    11-07 @ 07:01  -  11-08 @ 07:00  --------------------------------------------------------  IN: 400 mL / OUT: 1050 mL / NET: -650 mL          PHYSICAL EXAM:    Constitutional: NAD  Neck:  No JVD  Respiratory: CTAB/L  Cardiovascular: S1 and S2  Gastrointestinal: BS+, soft, NT/ND  Extremities: No peripheral edema  Neurological: A/O x 3, no focal deficits  Psychiatric: Normal mood, normal affect  : No Jay  Skin: No rashes  Access: [facility]    LABS:                        9.3    9.09  )-----------( 190      ( 07 Nov 2022 15:20 )             30.1                 Urine Studies:          RADIOLOGY & ADDITIONAL STUDIES:

## 2022-11-09 LAB
GLUCOSE BLDC GLUCOMTR-MCNC: 139 MG/DL — HIGH (ref 70–99)
GLUCOSE BLDC GLUCOMTR-MCNC: 206 MG/DL — HIGH (ref 70–99)
GLUCOSE BLDC GLUCOMTR-MCNC: 213 MG/DL — HIGH (ref 70–99)
GLUCOSE BLDC GLUCOMTR-MCNC: 216 MG/DL — HIGH (ref 70–99)

## 2022-11-09 RX ORDER — VALSARTAN 80 MG/1
320 TABLET ORAL DAILY
Refills: 0 | Status: DISCONTINUED | OUTPATIENT
Start: 2022-11-09 | End: 2022-11-14

## 2022-11-09 RX ORDER — COLCHICINE 0.6 MG
0.3 TABLET ORAL
Refills: 0 | Status: DISCONTINUED | OUTPATIENT
Start: 2022-11-09 | End: 2022-11-14

## 2022-11-09 RX ORDER — VALSARTAN 80 MG/1
120 TABLET ORAL DAILY
Refills: 0 | Status: DISCONTINUED | OUTPATIENT
Start: 2022-11-09 | End: 2022-11-09

## 2022-11-09 RX ADMIN — Medication 3 UNIT(S): at 09:40

## 2022-11-09 RX ADMIN — Medication 2: at 17:40

## 2022-11-09 RX ADMIN — HEPARIN SODIUM 5000 UNIT(S): 5000 INJECTION INTRAVENOUS; SUBCUTANEOUS at 05:55

## 2022-11-09 RX ADMIN — Medication 2: at 13:29

## 2022-11-09 RX ADMIN — Medication 0.3 MILLIGRAM(S): at 16:16

## 2022-11-09 RX ADMIN — GABAPENTIN 300 MILLIGRAM(S): 400 CAPSULE ORAL at 17:40

## 2022-11-09 RX ADMIN — Medication 650 MILLIGRAM(S): at 13:31

## 2022-11-09 RX ADMIN — Medication 650 MILLIGRAM(S): at 14:00

## 2022-11-09 RX ADMIN — GABAPENTIN 300 MILLIGRAM(S): 400 CAPSULE ORAL at 05:53

## 2022-11-09 RX ADMIN — Medication 100 MILLIGRAM(S): at 13:29

## 2022-11-09 RX ADMIN — Medication 20 MILLIGRAM(S): at 16:16

## 2022-11-09 RX ADMIN — Medication 81 MILLIGRAM(S): at 13:29

## 2022-11-09 RX ADMIN — ATORVASTATIN CALCIUM 80 MILLIGRAM(S): 80 TABLET, FILM COATED ORAL at 22:02

## 2022-11-09 RX ADMIN — CARVEDILOL PHOSPHATE 25 MILLIGRAM(S): 80 CAPSULE, EXTENDED RELEASE ORAL at 05:53

## 2022-11-09 RX ADMIN — CLOPIDOGREL BISULFATE 75 MILLIGRAM(S): 75 TABLET, FILM COATED ORAL at 13:29

## 2022-11-09 RX ADMIN — INSULIN GLARGINE 7 UNIT(S): 100 INJECTION, SOLUTION SUBCUTANEOUS at 22:00

## 2022-11-09 RX ADMIN — VALSARTAN 320 MILLIGRAM(S): 80 TABLET ORAL at 16:16

## 2022-11-09 RX ADMIN — HEPARIN SODIUM 5000 UNIT(S): 5000 INJECTION INTRAVENOUS; SUBCUTANEOUS at 17:41

## 2022-11-09 RX ADMIN — CARVEDILOL PHOSPHATE 25 MILLIGRAM(S): 80 CAPSULE, EXTENDED RELEASE ORAL at 17:41

## 2022-11-09 RX ADMIN — TAMSULOSIN HYDROCHLORIDE 0.4 MILLIGRAM(S): 0.4 CAPSULE ORAL at 22:00

## 2022-11-09 RX ADMIN — VALSARTAN 80 MILLIGRAM(S): 80 TABLET ORAL at 05:53

## 2022-11-09 RX ADMIN — Medication 3 UNIT(S): at 13:30

## 2022-11-09 RX ADMIN — Medication 3 UNIT(S): at 17:40

## 2022-11-09 NOTE — PROGRESS NOTE ADULT - ASSESSMENT
ASSESSMENT:  (1)CKD - stage 5 and now on HD   (2)Fatigue/weakness - improved   (3)Triple vessel disease sp stent     RECOMMEND:  (1)HD in am   (2)SP Cardiac stent and DAPT for 6 months    (3)Increase Valsartan 320mg po qd   (4)Dose new meds for GFR <10    DC planning once arrangements are made      Sayed Joan   [facility]  [phone]

## 2022-11-09 NOTE — PROGRESS NOTE ADULT - SUBJECTIVE AND OBJECTIVE BOX
[facility]    REVIEW OF SYSTEMS:  GEN: no fever,    no chills  RESP: no SOB,   no cough  CVS: no chest pain,   no palpitations  GI: no abdominal pain,   no nausea,   no vomiting,   no constipation,   no diarrhea  : no dysuria,   no frequency  NEURO: no headache,   no dizziness  PSYCH: no depression,   not anxious  Derm : no rash    MEDICATIONS  (STANDING):  allopurinol 100 milliGRAM(s) Oral daily  aspirin enteric coated 81 milliGRAM(s) Oral daily  atorvastatin 80 milliGRAM(s) Oral at bedtime  carvedilol 25 milliGRAM(s) Oral every 12 hours  clopidogrel Tablet 75 milliGRAM(s) Oral daily  dextrose 5%. 1000 milliLiter(s) (50 mL/Hr) IV Continuous <Continuous>  dextrose 5%. 1000 milliLiter(s) (100 mL/Hr) IV Continuous <Continuous>  dextrose 50% Injectable 25 Gram(s) IV Push once  dextrose 50% Injectable 12.5 Gram(s) IV Push once  dextrose 50% Injectable 25 Gram(s) IV Push once  dextrose Oral Gel 15 Gram(s) Oral once  gabapentin 300 milliGRAM(s) Oral two times a day  glucagon  Injectable 1 milliGRAM(s) IntraMuscular once  heparin   Injectable 5000 Unit(s) SubCutaneous every 12 hours  insulin glargine Injectable (LANTUS) 7 Unit(s) SubCutaneous at bedtime  insulin lispro (ADMELOG) corrective regimen sliding scale   SubCutaneous at bedtime  insulin lispro (ADMELOG) corrective regimen sliding scale   SubCutaneous three times a day before meals  insulin lispro Injectable (ADMELOG) 3 Unit(s) SubCutaneous three times a day before meals  lidocaine/prilocaine Cream 1 Application(s) Topical <User Schedule>  tamsulosin 0.4 milliGRAM(s) Oral at bedtime  valsartan 320 milliGRAM(s) Oral daily    MEDICATIONS  (PRN):  acetaminophen     Tablet .. 650 milliGRAM(s) Oral every 6 hours PRN Mild Pain (1 - 3), Moderate Pain (4 - 6)      Vital Signs Last 24 Hrs  T(C): 37.1 (09 Nov 2022 04:58), Max: 37.3 (08 Nov 2022 21:04)  T(F): 98.7 (09 Nov 2022 04:58), Max: 99.2 (08 Nov 2022 21:04)  HR: 78 (09 Nov 2022 04:58) (78 - 86)  BP: 173/85 (09 Nov 2022 04:58) (135/55 - 173/85)  BP(mean): 98 (08 Nov 2022 12:06) (98 - 98)  RR: 18 (09 Nov 2022 04:58) (17 - 18)  SpO2: 97% (09 Nov 2022 04:58) (94% - 97%)    Parameters below as of 09 Nov 2022 04:58  Patient On (Oxygen Delivery Method): room air      CAPILLARY BLOOD GLUCOSE      POCT Blood Glucose.: 152 mg/dL (08 Nov 2022 21:26)  POCT Blood Glucose.: 120 mg/dL (08 Nov 2022 18:05)  POCT Blood Glucose.: 96 mg/dL (08 Nov 2022 16:32)  POCT Blood Glucose.: 164 mg/dL (08 Nov 2022 13:17)    I&O's Summary    08 Nov 2022 07:01  -  09 Nov 2022 07:00  --------------------------------------------------------  IN: 0 mL / OUT: 1100 mL / NET: -1100 mL        PHYSICAL EXAM:  HEAD:  Atraumatic, Normocephalic  NECK: Supple, No   JVD  CHEST/LUNG:   no     rales,     no,    rhonchi  HEART: Regular rate and rhythm;         murmur  ABDOMEN: Soft, Nontender, ;   EXTREMITIES:    no    edema  NEUROLOGY:  alert    LABS:                        9.3    9.09  )-----------( 190      ( 07 Nov 2022 15:20 )             30.1                                   Consultant(s) Notes Reviewed:      Care Discussed with Consultants/Other Providers:

## 2022-11-09 NOTE — PROGRESS NOTE ADULT - SUBJECTIVE AND OBJECTIVE BOX
[facility] [phone]        Patient seen and examined in bed.  He was the same         MEDICATIONS  (STANDING):  allopurinol 100 milliGRAM(s) Oral daily  aspirin enteric coated 81 milliGRAM(s) Oral daily  atorvastatin 80 milliGRAM(s) Oral at bedtime  carvedilol 25 milliGRAM(s) Oral every 12 hours  clopidogrel Tablet 75 milliGRAM(s) Oral daily  dextrose 5%. 1000 milliLiter(s) (50 mL/Hr) IV Continuous <Continuous>  dextrose 5%. 1000 milliLiter(s) (100 mL/Hr) IV Continuous <Continuous>  dextrose 50% Injectable 25 Gram(s) IV Push once  dextrose 50% Injectable 12.5 Gram(s) IV Push once  dextrose 50% Injectable 25 Gram(s) IV Push once  dextrose Oral Gel 15 Gram(s) Oral once  gabapentin 300 milliGRAM(s) Oral two times a day  glucagon  Injectable 1 milliGRAM(s) IntraMuscular once  heparin   Injectable 5000 Unit(s) SubCutaneous every 12 hours  insulin glargine Injectable (LANTUS) 7 Unit(s) SubCutaneous at bedtime  insulin lispro (ADMELOG) corrective regimen sliding scale   SubCutaneous at bedtime  insulin lispro (ADMELOG) corrective regimen sliding scale   SubCutaneous three times a day before meals  insulin lispro Injectable (ADMELOG) 3 Unit(s) SubCutaneous three times a day before meals  lidocaine/prilocaine Cream 1 Application(s) Topical <User Schedule>  tamsulosin 0.4 milliGRAM(s) Oral at bedtime  valsartan 120 milliGRAM(s) Oral daily      VITAL:  T(C): , Max: 37.3 (11-08-22 @ 21:04)  T(F): , Max: 99.2 (11-08-22 @ 21:04)  HR: 78 (11-09-22 @ 04:58)  BP: 173/85 (11-09-22 @ 04:58)  BP(mean): 98 (11-08-22 @ 12:06)  RR: 18 (11-09-22 @ 04:58)  SpO2: 97% (11-09-22 @ 04:58)  Wt(kg): --    I and O's:    11-08 @ 07:01  -  11-09 @ 07:00  --------------------------------------------------------  IN: 0 mL / OUT: 1100 mL / NET: -1100 mL          PHYSICAL EXAM:    Constitutional: NAD  Neck:  No JVD  Respiratory: CTAB/L  Cardiovascular: S1 and S2  Gastrointestinal: BS+, soft, NT/ND  Extremities: No peripheral edema  Neurological: A/O x 3, no focal deficits  Psychiatric: Normal mood, normal affect  : No Jay  Skin: No rashes  Access: [facility]    LABS:                        9.3    9.09  )-----------( 190      ( 07 Nov 2022 15:20 )             30.1                 Urine Studies:          RADIOLOGY & ADDITIONAL STUDIES:

## 2022-11-09 NOTE — STUDENT SIGN OFF DOCUMENT - DOCUMENTS STUDENTS ARE SIGNED OFF ON
Plan of Care/Assessment and Intervention
Physical Therapy Evaluation
Plan of Care/Assessment and Intervention
Plan of Care/Assessment and Intervention

## 2022-11-09 NOTE — PROGRESS NOTE ADULT - SUBJECTIVE AND OBJECTIVE BOX
Chief complaint  Patient is a 62y old  Male who presents with a chief complaint of fevers (09 Nov 2022 09:04)   Review of systems  Patient in bed, looks comfortable, no hypoglycemic episodes.    Labs and Fingersticks  CAPILLARY BLOOD GLUCOSE      POCT Blood Glucose.: 206 mg/dL (09 Nov 2022 13:15)  POCT Blood Glucose.: 139 mg/dL (09 Nov 2022 09:37)  POCT Blood Glucose.: 152 mg/dL (08 Nov 2022 21:26)  POCT Blood Glucose.: 120 mg/dL (08 Nov 2022 18:05)  POCT Blood Glucose.: 96 mg/dL (08 Nov 2022 16:32)                                            9.3    9.09  )-----------( 190      ( 07 Nov 2022 15:20 )             30.1     Medications  MEDICATIONS  (STANDING):  allopurinol 100 milliGRAM(s) Oral daily  aspirin enteric coated 81 milliGRAM(s) Oral daily  atorvastatin 80 milliGRAM(s) Oral at bedtime  carvedilol 25 milliGRAM(s) Oral every 12 hours  clopidogrel Tablet 75 milliGRAM(s) Oral daily  dextrose 5%. 1000 milliLiter(s) (50 mL/Hr) IV Continuous <Continuous>  dextrose 5%. 1000 milliLiter(s) (100 mL/Hr) IV Continuous <Continuous>  dextrose 50% Injectable 25 Gram(s) IV Push once  dextrose 50% Injectable 12.5 Gram(s) IV Push once  dextrose 50% Injectable 25 Gram(s) IV Push once  dextrose Oral Gel 15 Gram(s) Oral once  gabapentin 300 milliGRAM(s) Oral two times a day  glucagon  Injectable 1 milliGRAM(s) IntraMuscular once  heparin   Injectable 5000 Unit(s) SubCutaneous every 12 hours  insulin glargine Injectable (LANTUS) 7 Unit(s) SubCutaneous at bedtime  insulin lispro (ADMELOG) corrective regimen sliding scale   SubCutaneous at bedtime  insulin lispro (ADMELOG) corrective regimen sliding scale   SubCutaneous three times a day before meals  insulin lispro Injectable (ADMELOG) 3 Unit(s) SubCutaneous three times a day before meals  lidocaine/prilocaine Cream 1 Application(s) Topical <User Schedule>  tamsulosin 0.4 milliGRAM(s) Oral at bedtime  valsartan 320 milliGRAM(s) Oral daily      Physical Exam  General: Patient comfortable in bed  Vital Signs Last 12 Hrs  T(F): 99.5 (11-09-22 @ 11:12), Max: 99.5 (11-09-22 @ 11:12)  HR: 78 (11-09-22 @ 11:12) (78 - 78)  BP: 151/68 (11-09-22 @ 11:12) (151/68 - 173/85)  BP(mean): 96 (11-09-22 @ 11:12) (96 - 96)  RR: 18 (11-09-22 @ 11:12) (18 - 18)  SpO2: 96% (11-09-22 @ 11:12) (96% - 97%)  Neck: No palpable thyroid nodules.  CVS: S1S2, No murmurs  Respiratory: No wheezing, no crepitations  GI: Abdomen soft, bowel sounds positive  Musculoskeletal:  edema lower extremities.   Skin: No skin rashes, no ecchymosis    Diagnostics    C-Peptide, Serum: Routine (11-03 @ 10:45)           Chief complaint  Patient is a 62y old  Male who presents with a chief complaint of fevers (09 Nov 2022 09:04)   Review of systems  Patient in bed, looks comfortable, no hypoglycemic episodes.    Labs and Fingersticks  CAPILLARY BLOOD GLUCOSE      POCT Blood Glucose.: 206 mg/dL (09 Nov 2022 13:15)  POCT Blood Glucose.: 139 mg/dL (09 Nov 2022 09:37)  POCT Blood Glucose.: 152 mg/dL (08 Nov 2022 21:26)  POCT Blood Glucose.: 120 mg/dL (08 Nov 2022 18:05)  POCT Blood Glucose.: 96 mg/dL (08 Nov 2022 16:32)                                            9.3    9.09  )-----------( 190      ( 07 Nov 2022 15:20 )             30.1     Medications  MEDICATIONS  (STANDING):  allopurinol 100 milliGRAM(s) Oral daily  aspirin enteric coated 81 milliGRAM(s) Oral daily  atorvastatin 80 milliGRAM(s) Oral at bedtime  carvedilol 25 milliGRAM(s) Oral every 12 hours  clopidogrel Tablet 75 milliGRAM(s) Oral daily  dextrose 5%. 1000 milliLiter(s) (50 mL/Hr) IV Continuous <Continuous>  dextrose 5%. 1000 milliLiter(s) (100 mL/Hr) IV Continuous <Continuous>  dextrose 50% Injectable 25 Gram(s) IV Push once  dextrose 50% Injectable 12.5 Gram(s) IV Push once  dextrose 50% Injectable 25 Gram(s) IV Push once  dextrose Oral Gel 15 Gram(s) Oral once  gabapentin 300 milliGRAM(s) Oral two times a day  glucagon  Injectable 1 milliGRAM(s) IntraMuscular once  heparin   Injectable 5000 Unit(s) SubCutaneous every 12 hours  insulin glargine Injectable (LANTUS) 7 Unit(s) SubCutaneous at bedtime  insulin lispro (ADMELOG) corrective regimen sliding scale   SubCutaneous at bedtime  insulin lispro (ADMELOG) corrective regimen sliding scale   SubCutaneous three times a day before meals  insulin lispro Injectable (ADMELOG) 3 Unit(s) SubCutaneous three times a day before meals  lidocaine/prilocaine Cream 1 Application(s) Topical <User Schedule>  tamsulosin 0.4 milliGRAM(s) Oral at bedtime  valsartan 320 milliGRAM(s) Oral daily      Physical Exam  General: Patient comfortable in bed  Vital Signs Last 12 Hrs  T(F): 99.5 (11-09-22 @ 11:12), Max: 99.5 (11-09-22 @ 11:12)  HR: 78 (11-09-22 @ 11:12) (78 - 78)  BP: 151/68 (11-09-22 @ 11:12) (151/68 - 173/85)  BP(mean): 96 (11-09-22 @ 11:12) (96 - 96)  RR: 18 (11-09-22 @ 11:12) (18 - 18)  SpO2: 96% (11-09-22 @ 11:12) (96% - 97%)  Neck: No palpable thyroid nodules.  CVS: S1S2, No murmurs  Respiratory: No wheezing, no crepitations  GI: Abdomen soft, bowel sounds positive  Musculoskeletal:  edema lower extremities.   Skin: No skin rashes, no ecchymosis    Diagnostics    C-Peptide, Serum: Routine (11-03 @ 10:45)

## 2022-11-09 NOTE — PROGRESS NOTE ADULT - SUBJECTIVE AND OBJECTIVE BOX
CARDIOLOGY     PROGRESS  NOTE   ________________________________________________    CHIEF COMPLAINT:Patient is a 62y old  Male who presents with a chief complaint of fevers (08 Nov 2022 12:39)  no complain, increase bp  	  REVIEW OF SYSTEMS:  CONSTITUTIONAL: No fever, weight loss, or fatigue  EYES: No eye pain, visual disturbances, or discharge  ENT:  No difficulty hearing, tinnitus, vertigo; No sinus or throat pain  NECK: No pain or stiffness  RESPIRATORY: No cough, wheezing, chills or hemoptysis; no Shortness of Breath  CARDIOVASCULAR: No chest pain, palpitations, passing out, dizziness, or leg swelling  GASTROINTESTINAL: No abdominal or epigastric pain. No nausea, vomiting, or hematemesis; No diarrhea or constipation. No melena or hematochezia.  GENITOURINARY: No dysuria, frequency, hematuria, or incontinence  NEUROLOGICAL: No headaches, memory loss, loss of strength, numbness, or tremors  SKIN: No itching, burning, rashes, or lesions   LYMPH Nodes: No enlarged glands  ENDOCRINE: No heat or cold intolerance; No hair loss  MUSCULOSKELETAL: No joint pain or swelling; No muscle, back, or extremity pain  PSYCHIATRIC: No depression, anxiety, mood swings, or difficulty sleeping  HEME/LYMPH: No easy bruising, or bleeding gums  ALLERGY AND IMMUNOLOGIC: No hives or eczema	    [x ] All others negative	  [ ] Unable to obtain    PHYSICAL EXAM:  T(C): 37.1 (11-09-22 @ 04:58), Max: 37.3 (11-08-22 @ 21:04)  HR: 78 (11-09-22 @ 04:58) (78 - 86)  BP: 173/85 (11-09-22 @ 04:58) (135/55 - 173/85)  RR: 18 (11-09-22 @ 04:58) (17 - 18)  SpO2: 97% (11-09-22 @ 04:58) (94% - 97%)  Wt(kg): --  I&O's Summary    07 Nov 2022 07:01  -  08 Nov 2022 07:00  --------------------------------------------------------  IN: 400 mL / OUT: 1050 mL / NET: -650 mL    08 Nov 2022 07:01  -  09 Nov 2022 06:50  --------------------------------------------------------  IN: 0 mL / OUT: 1100 mL / NET: -1100 mL        Appearance: Normal	  HEENT:   Normal oral mucosa, PERRL, EOMI	  Lymphatic: No lymphadenopathy  Cardiovascular: Normal S1 S2, No JVD, + murmurs, No edema  Respiratory: rhonchi  Psychiatry: A & O x 3, Mood & affect appropriate  Gastrointestinal:  Soft, Non-tender, + BS	  Skin: No rashes, No ecchymoses, No cyanosis	  Neurologic: Non-focal  Extremities: Normal range of motion, No clubbing, cyanosis or edema  Vascular: Peripheral pulses palpable 2+ bilaterally    MEDICATIONS  (STANDING):  allopurinol 100 milliGRAM(s) Oral daily  aspirin enteric coated 81 milliGRAM(s) Oral daily  atorvastatin 80 milliGRAM(s) Oral at bedtime  carvedilol 25 milliGRAM(s) Oral every 12 hours  clopidogrel Tablet 75 milliGRAM(s) Oral daily  dextrose 5%. 1000 milliLiter(s) (50 mL/Hr) IV Continuous <Continuous>  dextrose 5%. 1000 milliLiter(s) (100 mL/Hr) IV Continuous <Continuous>  dextrose 50% Injectable 25 Gram(s) IV Push once  dextrose 50% Injectable 12.5 Gram(s) IV Push once  dextrose 50% Injectable 25 Gram(s) IV Push once  dextrose Oral Gel 15 Gram(s) Oral once  gabapentin 300 milliGRAM(s) Oral two times a day  glucagon  Injectable 1 milliGRAM(s) IntraMuscular once  heparin   Injectable 5000 Unit(s) SubCutaneous every 12 hours  insulin glargine Injectable (LANTUS) 7 Unit(s) SubCutaneous at bedtime  insulin lispro (ADMELOG) corrective regimen sliding scale   SubCutaneous three times a day before meals  insulin lispro (ADMELOG) corrective regimen sliding scale   SubCutaneous at bedtime  insulin lispro Injectable (ADMELOG) 3 Unit(s) SubCutaneous three times a day before meals  lidocaine/prilocaine Cream 1 Application(s) Topical <User Schedule>  tamsulosin 0.4 milliGRAM(s) Oral at bedtime  valsartan 80 milliGRAM(s) Oral daily      TELEMETRY: 	    ECG:  	  RADIOLOGY:  OTHER: 	  	  LABS:	 	    CARDIAC MARKERS:                                9.3    9.09  )-----------( 190      ( 07 Nov 2022 15:20 )             30.1           proBNP:   Lipid Profile:   HgA1c:   TSH:     Notes: Pt  medically ready for discharge .  Information  requested casandra [facility] ([id number]) Phone: [phone]. Pt has been  Accepted at our [facility] for a MWF 3rd shift with a chair  time 3:30pm.  They aware pt will be going to rehab first. Send updated  information to [facility] and they fowaring information to the dialysisdept.  Requested updated auth . Met with pt and his spouseLucía [phone].  Pt give verbal permission to discussed d/c plan with spouse also. Discussed  above information .Cm will remain active in pt discharge plan. Pt to be seen By  Physical and occupationaltherapist.      Assessment and plan  ---------------------------  62 yom hx of CKD, DKA, DMtype2, cardiomyopathy w/ stents, HTN, gout p/w days of generalized weakness and AMS. Per wife, pt was just in [facility] for DKA. Was d/c'd w/ fever and still weak. Since tuesday, pt becoming more lethargic and altered especially when he waking up in morning. BG have been in the 200s since per cont BG monitor. Subjective fevers at home, wife feels that his abd is larger than it was before d/c from [facility]. Question of if pt has heather compliant w/ short and long acting insulin as wife works nights and is not there to give all meds at home. Unclear if pt has been falling at home but w/ bruising on his abd.   pt with hx of ashd, chf, cardiomyopathy, dm, s/p CARDIOMEM fu at [facility] with change of mental stratus and sob.  ct scan noted , no active chf  will adjust cardiac meds  will call chf team at [facility] to get Cardiomem readings  beta blocker/ hydralazine and Nitrate  ckd, renal follow up  check pro bnp  dvt prophylaxis  pt with known hx of cad, last stress test  in 07/2021, no ischemia with scar in mary septal and apical area  ct chest abdomen and pelvis noted, no pleural effusion or sign of chf  renal increased renal function last admission CR 4.8  continue asa daily /coreg will add ace as pt on HD  avoid excess fluid, may dc ivf, hx of chf , EF40%  s/p HD yesterday  dc colchicine  MRI noted may consider thoracic MRI  start on ARB while on HD, repeat echo noted with sig lv dysfunction and wall motion abnormality  increase fluid withrawal with increase LV pressure  increase valsartan as tolertaed  s/p stent yesterday , now with second stent today px LAD  increase valsartan for better bp control  HDcontinue DAPT for 6 months, then will switch asa daily  dc planning  increase valsartan dose to control bp  repeat echo in 3 months

## 2022-11-09 NOTE — PROGRESS NOTE ADULT - ASSESSMENT
2 yom   hx of CKD,   s/p DKA, DM.     cardiomyopathy,  CAD w/ stents, HTN, gout     generalized  weakness.  with   c/c h/o b/l leg weakness/ and   b/l arm weakness, r > l      p/w days of generalized weakness and AMS    per wife, pt was just in [facility] for DKA.  and  was d/c'd w/ fever and still weak.     more lethargic and altered /  resolved           *   pt  admitted  with fevers/ ams. / metabolic  encephalopathy   on  arrival, wbc  was12,000       bcx/ ucx,  was negative      no source of infection found        was on   Zosyn  ,  Ct  a/p,   ? cystitis/   ID eval dr harrington    * CKD,  crt is 5,4/ ,  renal  dr dickerson     *   c/c  anemia     *   DM,  follow  fs        on lantus/  known to  [facility]ley     *  ef 40     *   c/c  weakness  of   limbs/  neuro ga mccain       has  functional  quadriplegia/  CT  head, . old  arachnoid  cyst/  no intervention      *    acute  gout, right  wrist, on  prednisone/ colchicine     on dvt  ppx     MRI  head/  C  spine.  old  infarcts    much improved, more  alert/  spoke  with wife/     wife  wants  rehab/,  unable  to take care of  pt     glucos e noted,  stopped  admelog/  follow fs/ pt  is  a  finnicky  eater     echo  from 10/28.  ef  30/   s/p cath, cad.  LAD/ RCA/ pt   had refuse d intervention     staged  PCI on 11/2  and on 11/3   to  RCA  and  LAD    awiating  d/c  to rehab,  doing well/  d/c order written   spoke   to wife/   discussed  with team /  still  awiating   d/c           rad< from: CT Abdomen and Pelvis No Cont (10.22.22 @ 15:40) >  IMPRESSION:  Mild bladder wall thickening. Correlate with urinalysis  --- End of Report ---  < end of copied text >      rad< from: Transthoracic Echocardiogram (12.07.21 @ 11:24) >  nclusions:  1. Normal mitral valve. Minimal mitral regurgitation.  2. Calcified trileaflet aortic valve with normal opening.  Minimal aortic regurgitation.  3. Mild segmental left ventricular systolic dysfunction.  The basal inferior, basal septum, distal septum, distal  inferior, and the apex are akinetic. Recommend limited  repeat imaging with intravenous echo contrast to better  visualize the apex. (Per sonographer, patient did not agree  to echo contrast during the study.)  4. Mild diastolic dysfunction (Stage I).  5. Normal right ventricular size and function.  *** No previous Echo exam.    < end of copied text >

## 2022-11-09 NOTE — PROGRESS NOTE ADULT - ASSESSMENT
Assessment  DMT2: 62y Male with DM T2 with hyperglycemia admitted with weakness, was on insulin at home, now on low-dose basal bolus insulin, blood sugars are trending within overall acceptable range with intermittent elevations, no new hypoglycemias. Patient is s/p PCI, eating meals with inconsistent intake, awaiting DC to rehab, no acute events.  CAD: on medications, monitored, asymptomatic.  HTN: On antihypertensive medications, monitored, asymptomatic.  CKD:  labs, chart reviewed.          Rubén Escoto MD  Cell:  [phone]  Office:     Rachel SHORT  Cell:   Office:                Assessment  DMT2: 62y Male with DM T2 with hyperglycemia admitted with weakness, was on insulin at home, now on low-dose basal bolus insulin, blood sugars are trending within overall acceptable range with intermittent elevations, no new  hypoglycemias. Patient is s/p PCI, eating meals with inconsistent intake, awaiting DC to rehab, no acute events.  CAD: on medications, monitored, asymptomatic.  HTN: On antihypertensive medications, monitored, asymptomatic.  CKD:  labs, chart reviewed.          Rubén Escoto MD  Cell:  [phone]  Office:     Rachel SHORT  Cell:   Office:

## 2022-11-10 LAB
ALBUMIN SERPL ELPH-MCNC: 2.8 G/DL — LOW (ref 3.3–5)
ALP SERPL-CCNC: 124 U/L — HIGH (ref 40–120)
ALT FLD-CCNC: 12 U/L — SIGNIFICANT CHANGE UP (ref 10–45)
AST SERPL-CCNC: 12 U/L — SIGNIFICANT CHANGE UP (ref 10–40)
BILIRUB DIRECT SERPL-MCNC: <0.1 MG/DL — SIGNIFICANT CHANGE UP (ref 0–0.3)
BILIRUB INDIRECT FLD-MCNC: >0.1 MG/DL — LOW (ref 0.2–1)
BILIRUB SERPL-MCNC: 0.2 MG/DL — SIGNIFICANT CHANGE UP (ref 0.2–1.2)
CREAT SERPL-MCNC: 2.1 MG/DL — HIGH (ref 0.5–1.3)
EGFR: 35 ML/MIN/1.73M2 — LOW
GLUCOSE BLDC GLUCOMTR-MCNC: 282 MG/DL — HIGH (ref 70–99)
GLUCOSE BLDC GLUCOMTR-MCNC: 339 MG/DL — HIGH (ref 70–99)
GLUCOSE BLDC GLUCOMTR-MCNC: 376 MG/DL — HIGH (ref 70–99)
GLUCOSE BLDC GLUCOMTR-MCNC: 388 MG/DL — HIGH (ref 70–99)
INR BLD: 1.1 RATIO — SIGNIFICANT CHANGE UP (ref 0.88–1.16)
PROT SERPL-MCNC: 6.2 G/DL — SIGNIFICANT CHANGE UP (ref 6–8.3)
PROTHROM AB SERPL-ACNC: 12.8 SEC — SIGNIFICANT CHANGE UP (ref 10.5–13.4)
RAPID RVP RESULT: DETECTED
SARS-COV-2 RNA SPEC QL NAA+PROBE: DETECTED

## 2022-11-10 RX ORDER — AMLODIPINE BESYLATE 2.5 MG/1
5 TABLET ORAL DAILY
Refills: 0 | Status: DISCONTINUED | OUTPATIENT
Start: 2022-11-10 | End: 2022-11-14

## 2022-11-10 RX ORDER — REMDESIVIR 5 MG/ML
INJECTION INTRAVENOUS
Refills: 0 | Status: COMPLETED | OUTPATIENT
Start: 2022-11-10 | End: 2022-11-12

## 2022-11-10 RX ORDER — INSULIN LISPRO 100/ML
5 VIAL (ML) SUBCUTANEOUS
Refills: 0 | Status: DISCONTINUED | OUTPATIENT
Start: 2022-11-10 | End: 2022-11-11

## 2022-11-10 RX ORDER — REMDESIVIR 5 MG/ML
200 INJECTION INTRAVENOUS EVERY 24 HOURS
Refills: 0 | Status: COMPLETED | OUTPATIENT
Start: 2022-11-10 | End: 2022-11-10

## 2022-11-10 RX ORDER — REMDESIVIR 5 MG/ML
100 INJECTION INTRAVENOUS EVERY 24 HOURS
Refills: 0 | Status: COMPLETED | OUTPATIENT
Start: 2022-11-11 | End: 2022-11-12

## 2022-11-10 RX ORDER — CHLORHEXIDINE GLUCONATE 213 G/1000ML
1 SOLUTION TOPICAL DAILY
Refills: 0 | Status: DISCONTINUED | OUTPATIENT
Start: 2022-11-10 | End: 2022-11-14

## 2022-11-10 RX ORDER — ERYTHROPOIETIN 10000 [IU]/ML
10000 INJECTION, SOLUTION INTRAVENOUS; SUBCUTANEOUS ONCE
Refills: 0 | Status: COMPLETED | OUTPATIENT
Start: 2022-11-10 | End: 2022-11-10

## 2022-11-10 RX ORDER — INSULIN GLARGINE 100 [IU]/ML
10 INJECTION, SOLUTION SUBCUTANEOUS AT BEDTIME
Refills: 0 | Status: DISCONTINUED | OUTPATIENT
Start: 2022-11-10 | End: 2022-11-11

## 2022-11-10 RX ADMIN — Medication 100 MILLIGRAM(S): at 11:18

## 2022-11-10 RX ADMIN — Medication 4: at 09:59

## 2022-11-10 RX ADMIN — Medication 5 UNIT(S): at 14:04

## 2022-11-10 RX ADMIN — HEPARIN SODIUM 5000 UNIT(S): 5000 INJECTION INTRAVENOUS; SUBCUTANEOUS at 17:01

## 2022-11-10 RX ADMIN — Medication 5: at 17:01

## 2022-11-10 RX ADMIN — VALSARTAN 320 MILLIGRAM(S): 80 TABLET ORAL at 05:08

## 2022-11-10 RX ADMIN — LIDOCAINE AND PRILOCAINE CREAM 1 APPLICATION(S): 25; 25 CREAM TOPICAL at 16:09

## 2022-11-10 RX ADMIN — CARVEDILOL PHOSPHATE 25 MILLIGRAM(S): 80 CAPSULE, EXTENDED RELEASE ORAL at 22:35

## 2022-11-10 RX ADMIN — CLOPIDOGREL BISULFATE 75 MILLIGRAM(S): 75 TABLET, FILM COATED ORAL at 11:18

## 2022-11-10 RX ADMIN — GABAPENTIN 300 MILLIGRAM(S): 400 CAPSULE ORAL at 05:08

## 2022-11-10 RX ADMIN — TAMSULOSIN HYDROCHLORIDE 0.4 MILLIGRAM(S): 0.4 CAPSULE ORAL at 22:36

## 2022-11-10 RX ADMIN — REMDESIVIR 500 MILLIGRAM(S): 5 INJECTION INTRAVENOUS at 22:35

## 2022-11-10 RX ADMIN — Medication 20 MILLIGRAM(S): at 05:08

## 2022-11-10 RX ADMIN — Medication 200 MILLIGRAM(S): at 05:07

## 2022-11-10 RX ADMIN — Medication 5 UNIT(S): at 09:59

## 2022-11-10 RX ADMIN — Medication 5: at 14:04

## 2022-11-10 RX ADMIN — Medication 1: at 22:36

## 2022-11-10 RX ADMIN — GABAPENTIN 300 MILLIGRAM(S): 400 CAPSULE ORAL at 17:04

## 2022-11-10 RX ADMIN — HEPARIN SODIUM 5000 UNIT(S): 5000 INJECTION INTRAVENOUS; SUBCUTANEOUS at 05:07

## 2022-11-10 RX ADMIN — ERYTHROPOIETIN 10000 UNIT(S): 10000 INJECTION, SOLUTION INTRAVENOUS; SUBCUTANEOUS at 18:25

## 2022-11-10 RX ADMIN — ATORVASTATIN CALCIUM 80 MILLIGRAM(S): 80 TABLET, FILM COATED ORAL at 22:35

## 2022-11-10 RX ADMIN — CARVEDILOL PHOSPHATE 25 MILLIGRAM(S): 80 CAPSULE, EXTENDED RELEASE ORAL at 05:08

## 2022-11-10 RX ADMIN — Medication 81 MILLIGRAM(S): at 11:18

## 2022-11-10 RX ADMIN — INSULIN GLARGINE 10 UNIT(S): 100 INJECTION, SOLUTION SUBCUTANEOUS at 22:36

## 2022-11-10 NOTE — PROGRESS NOTE ADULT - ASSESSMENT
ASSESSMENT:  (1)ESRD  (2)COVID    (3)Triple vessel disease sp stent     RECOMMEND:  (1)HD today   (2)SP Cardiac stent and DAPT for 6 months    (3)Increase Valsartan 320mg po qd;  Add Norvasc for added BP control   (4)Dose new meds for GFR <10  (5)Covid positive and ID eval called      [facility]  [phone]

## 2022-11-10 NOTE — PROGRESS NOTE ADULT - SUBJECTIVE AND OBJECTIVE BOX
CARDIOLOGY     PROGRESS  NOTE   ________________________________________________    CHIEF COMPLAINT:Patient is a 62y old  Male who presents with a chief complaint of fevers (09 Nov 2022 13:25)  no complain  	  REVIEW OF SYSTEMS:  CONSTITUTIONAL: No fever, weight loss, or fatigue  EYES: No eye pain, visual disturbances, or discharge  ENT:  No difficulty hearing, tinnitus, vertigo; No sinus or throat pain  NECK: No pain or stiffness  RESPIRATORY: No cough, wheezing, chills or hemoptysis; No Shortness of Breath  CARDIOVASCULAR: No chest pain, palpitations, passing out, dizziness, or leg swelling  GASTROINTESTINAL: No abdominal or epigastric pain. No nausea, vomiting, or hematemesis; No diarrhea or constipation. No melena or hematochezia.  GENITOURINARY: No dysuria, frequency, hematuria, or incontinence  NEUROLOGICAL: No headaches, memory loss, loss of strength, numbness, or tremors  SKIN: No itching, burning, rashes, or lesions   LYMPH Nodes: No enlarged glands  ENDOCRINE: No heat or cold intolerance; No hair loss  MUSCULOSKELETAL: No joint pain or swelling; No muscle, back, or extremity pain  PSYCHIATRIC: No depression, anxiety, mood swings, or difficulty sleeping  HEME/LYMPH: No easy bruising, or bleeding gums  ALLERGY AND IMMUNOLOGIC: No hives or eczema	    [ ] All others negative	  [ ] Unable to obtain    PHYSICAL EXAM:  T(C): 36.8 (11-10-22 @ 05:04), Max: 37.6 (11-09-22 @ 21:12)  HR: 81 (11-10-22 @ 05:04) (78 - 81)  BP: 164/62 (11-10-22 @ 05:04) (145/82 - 164/62)  RR: 18 (11-10-22 @ 05:04) (18 - 18)  SpO2: 98% (11-10-22 @ 05:04) (96% - 98%)  Wt(kg): --  I&O's Summary    09 Nov 2022 07:01  -  10 Nov 2022 07:00  --------------------------------------------------------  IN: 240 mL / OUT: 1000 mL / NET: -760 mL        Appearance: Normal	  HEENT:   Normal oral mucosa, PERRL, EOMI	  Lymphatic: No lymphadenopathy  Cardiovascular: Normal S1 S2, No JVD, +murmurs, No edema  Respiratoryrhonchi  Psychiatry: A & O x 3, Mood & affect appropriate  Gastrointestinal:  Soft, Non-tender, + BS	  Skin: No rashes, No ecchymoses, No cyanosis	  Neurologic: Non-focal  Extremities: Normal range of motion, No clubbing, cyanosis or edema  Vascular: Peripheral pulses palpable 2+ bilaterally    MEDICATIONS  (STANDING):  allopurinol 100 milliGRAM(s) Oral daily  aspirin enteric coated 81 milliGRAM(s) Oral daily  atorvastatin 80 milliGRAM(s) Oral at bedtime  carvedilol 25 milliGRAM(s) Oral every 12 hours  clopidogrel Tablet 75 milliGRAM(s) Oral daily  colchicine 0.3 milliGRAM(s) Oral every 48 hours  dextrose 5%. 1000 milliLiter(s) (50 mL/Hr) IV Continuous <Continuous>  dextrose 5%. 1000 milliLiter(s) (100 mL/Hr) IV Continuous <Continuous>  dextrose 50% Injectable 25 Gram(s) IV Push once  dextrose 50% Injectable 12.5 Gram(s) IV Push once  dextrose 50% Injectable 25 Gram(s) IV Push once  dextrose Oral Gel 15 Gram(s) Oral once  gabapentin 300 milliGRAM(s) Oral two times a day  glucagon  Injectable 1 milliGRAM(s) IntraMuscular once  heparin   Injectable 5000 Unit(s) SubCutaneous every 12 hours  insulin glargine Injectable (LANTUS) 7 Unit(s) SubCutaneous at bedtime  insulin lispro (ADMELOG) corrective regimen sliding scale   SubCutaneous three times a day before meals  insulin lispro (ADMELOG) corrective regimen sliding scale   SubCutaneous at bedtime  insulin lispro Injectable (ADMELOG) 3 Unit(s) SubCutaneous three times a day before meals  lidocaine/prilocaine Cream 1 Application(s) Topical <User Schedule>  predniSONE   Tablet 20 milliGRAM(s) Oral daily  tamsulosin 0.4 milliGRAM(s) Oral at bedtime  valsartan 320 milliGRAM(s) Oral daily      TELEMETRY: 	    ECG:  	  RADIOLOGY:  OTHER: 	  	  LABS:	 	    CARDIAC MARKERS:                  proBNP:   Lipid Profile:   HgA1c:   TSH:         Assessment and plan  ---------------------------  62 yom hx of CKD, DKA, DMtype2, cardiomyopathy w/ stents, HTN, gout p/w days of generalized weakness and AMS. Per wife, pt was just in [facility] for DKA. Was d/c'd w/ fever and still weak. Since tuesday, pt becoming more lethargic and altered especially when he waking up in morning. BG have been in the 200s since per cont BG monitor. Subjective fevers at home, wife feels that his abd is larger than it was before d/c from [facility]. Question of if pt has heather compliant w/ short and long acting insulin as wife works nights and is not there to give all meds at home. Unclear if pt has been falling at home but w/ bruising on his abd.   pt with hx of ashd, chf, cardiomyopathy, dm, s/p CARDIOMEM fu at [facility] with change of mental stratus and sob.  ct scan noted , no active chf  will adjust cardiac meds  will call chf team at [facility] to get Cardiomem readings  beta blocker/ hydralazine and Nitrate  ckd, renal follow up  check pro bnp  dvt prophylaxis  pt with known hx of cad, last stress test  in 07/2021, no ischemia with scar in mary septal and apical area  ct chest abdomen and pelvis noted, no pleural effusion or sign of chf  renal increased renal function last admission CR 4.8  continue asa daily /coreg will add ace as pt on HD  avoid excess fluid, may dc ivf, hx of chf , EF40%  s/p HD yesterday  dc colchicine  MRI noted may consider thoracic MRI  start on ARB while on HD, repeat echo noted with sig lv dysfunction and wall motion abnormality  increase fluid withrawal with increase LV pressure  increase valsartan as tolertaed  s/p stent yesterday , now with second stent today px LAD  increase valsartan for better bp control  HDcontinue DAPT for 6 months, then will switch asa daily  dc planning  increase valsartan dose to control bp now up to 320 mg  repeat echo in 3 months

## 2022-11-10 NOTE — PROGRESS NOTE ADULT - ASSESSMENT
Assessment  DMT2: 62y Male with DM T2 with hyperglycemia admitted with weakness, was on insulin at home, now on low-dose basal bolus insulin, blood sugars are trending up/running high and not at target in the setting of steroid management, no hypoglycemias, on prednisone now, tested covid+.  CAD: on medications, monitored, asymptomatic.  HTN: On antihypertensive medications, monitored, asymptomatic.  CKD:  labs, chart reviewed.          Rubén Escoto MD  Cell:  [phone]  Office: [phone]    Rachel SHORT  Cell:   Office: [phone]               Assessment  DMT2: 62y Male with DM T2 with hyperglycemia admitted with weakness, was on insulin at home, now on low-dose basal bolus insulin, blood  sugars are trending up/running high and not at target in the setting of steroid management, no hypoglycemias, on prednisone now, tested covid+.  CAD: on medications, monitored, asymptomatic.  HTN: On antihypertensive medications, monitored, asymptomatic.  CKD:  labs, chart reviewed.          Rubén Escoto MD  Cell:  [phone]  Office: [phone]    Rachel SHORT  Cell:   Office: [phone]

## 2022-11-10 NOTE — PROGRESS NOTE ADULT - SUBJECTIVE AND OBJECTIVE BOX
CC: f/u for  covid  Patient reports  started to cough yesterday  REVIEW OF SYSTEMS:  All other review of systems negative (Comprehensive ROS)    Antimicrobials Day #  :    Other Medications Reviewed    T(F): 98.3 (11-10-22 @ 14:39), Max: 99.7 (11-09-22 @ 21:12)  HR: 81 (11-10-22 @ 14:39)  BP: 133/69 (11-10-22 @ 14:39)  RR: 18 (11-10-22 @ 14:39)  SpO2: 95% (11-10-22 @ 14:39)  Wt(kg): --    PHYSICAL EXAM:  General: alert, no acute distress  Eyes:  anicteric, no conjunctival injection, no discharge  Oropharynx: no lesions or injection 	  Neck: supple, without adenopathy  Lungs: clear to auscultation  Heart: regular rate and rhythm; no murmur, rubs or gallops  Abdomen: soft, nondistended, nontender, without mass or organomegaly  Skin: no lesions  Extremities: no clubbing, cyanosis, or edema  Neurologic: alert, oriented, moves all extremities    LAB RESULTS:              MICROBIOLOGY:  RECENT CULTURES:      RADIOLOGY REVIEWED:  < from: MR Cervical Spine No Cont (10.25.22 @ 22:22) >    ACC: [id number] EXAM:  MR SPINE CERVICAL                        ACC: [id number] EXAM:  MR BRAIN                          PROCEDURE DATE:  10/25/2022          INTERPRETATION:  INTERPRETATION:  MRI BRAIN    CLINICAL INDICATION: Altered mental status. Weakness.    MRI OF THE BRAIN WITHOUT CONTRAST:    DATE:  5/24/2022      TECHNIQUE:  Axial SPGR, axial FSE T2-weighted, axial FLAIR, sagittal T1 FLAIR, axial   GRE, and axial diffusion-weighted images of the brain were obtained   without the use of contrast.    COMPARISON: 7/28/2018    FINDINGS: Prominence of the ventricles and subarachnoid spaces,   compatible with atrophy. Periventricular small vessel white matter   ischemic changes are appreciated. Left middle cranial fossa arachnoid   cyst measuring 2.9 cm x 2.0 cm, unchanged in appearance compared with   prior.    No acute intracranial hemorrhage, intraparenchymal mass lesion or midline   shift. Old left basal ganglia infarct.    Magnetic susceptibility artifact noted in the region of the basal ganglia   bilaterally, likely related to deposition of calcium in this location.    Sella turcica is unremarkable in appearance.    Bilateral optic globes are intact. Mucosal thickening along the inferior   aspect of bilateral maxillary sinuses. Small left maxillary sinus polyp   versus retention cysts. Mild mucosal thickening along the anterior aspect   of the bilateral ethmoid air cells. Very minimal inflammatory changes   within left-sided mastoid air cells.      IMPRESSION: Please see below      MRI OF THE CERVICAL SPINE WITHOUT CONTRAST    INDICATIONS: Sensory intact to light touch. Absent proprioception in   toes. Distal atrophy. Bilateral dysmetria.      TECHNIQUE:    Sagittal T1, sagittal T2, sagittal inversion recovery, axial 3D FIESTA,   and axial T1 images are obtained of the cervical spine without the use of   contrast.    COMPARISON:  None available at this time.      FINDINGS:    Straightening of the normal lordotic curvature.    Prevertebral soft tissue is normal in thickness. No abnormal signal   intensity.    No evidence of anterior or posterior ligamentous injury.    There is no abnormal T2 prolongation within the intramedullary intradural   cervical spinal cord. No spinal canal mass is seen.    C2-C3: No significant spinal canal stenosis or neural foraminal narrowing.    C3-C4: Minimal posterior osteophyte formation mildly impresses on the   ventral thecal sac. No spinal cord compression. Mild to moderate left   neural foraminal narrowing.    C4-C5: No significant spinal canal stenosis. Mild to moderate left neural   foraminal narrowing.    C5-C6: Mild broad-based bulging of disc material mildly impresses on the   ventral thecal sac, but not the spinal cord. Mild to moderate left neural   foraminal narrowing.    C6-C7: There is a small disc osteophyte complex which minimally impresses   on the ventral thecal sac, but not the spinal cord. Mild right and mild   to moderate left neural foraminal narrowing.    C7-T1: No significant spinal canal stenosis or neural foraminal narrowing.    More prominent bulging of disc material is appreciated at T2-T3, with   questionable encroachment on the spinal cord. Please note that axial   imaging was not obtained through this level and further dedicated imaging   of the thoracic spine may be considered, as clinically indicated.      IMPRESSION:    Degenerative changes. No significant canal or neural foraminal stenosis.      MRI OF THE BRAIN:    1. Overall, findings are stable in appearance compared with earlier   examination dated 7/28/2018.  2. Atrophy, old left basal ganglial lacunar infarct and left middle   cranial fossa arachnoid cyst.  3. No restricted diffusion. No acute ischemic event.    MRI OF THE CERVICAL SPINE:  1. No acute fracture or AP plane subluxation.  2. No abnormal intramedullary T2 increased signal within imaged portions   of the cervical spinal cord.  3. Mild degenerative changes, as described in detail above.  4. More prominent bulging of disc material is appreciated at T2-T3, with   questionable encroachment on the spinal cord. Please note that axial   imaging was not obtained through this level and further dedicated imaging   of the thoracic spine may be considered, as clinically indicated.    --- End of Report ---          < end of copied text >              Assessment:  Patient with dm, ckd now esrd, ischemic cardiomyopathy admitted a few weeks ago after a stay at [facility] for hyperglycemia. He presented with fever, diffuse gout, altered ms. No infection found, got better on prednisone and dialysis, s/p pci for ischemic cardiomyopathy. He now has covid with symptoms of cough and high risk for complication so will do remdesivir for 3 days  Plan:  remdesivir for 3 days  monitor liver enzymes  dvt prophylaxis

## 2022-11-10 NOTE — PROGRESS NOTE ADULT - ASSESSMENT
2 yom   hx of CKD,   s/p DKA, DM.     cardiomyopathy,  CAD w/ stents, HTN, gout     generalized  weakness.  with   c/c h/o b/l leg weakness/ and   b/l arm weakness, r > l      p/w days of generalized weakness and AMS    per wife, pt was just in [facility] for DKA.  and  was d/c'd w/ fever and still weak.     more lethargic and altered /  resolved           *   pt  admitted  with fevers/ ams. / metabolic  encephalopathy   on  arrival, wbc  was12,000       bcx/ ucx,  was negative      no source of infection found        was on   Zosyn  ,  Ct  a/p,   ? cystitis/   ID eval dr harrington    * CKD,  crt is 5,4/ ,  renal  dr dickerson     *   c/c  anemia     *   DM,  follow  fs        on lantus/  known to  nika talley     *  ef 40     *   c/c  weakness  of   limbs/  neuro ga mccain       has  functional  quadriplegia/  CT  head, . old  arachnoid  cyst/  no intervention      *    acute  gout, right  wrist, on  prednisone/ colchicine     on dvt  ppx     MRI  head/  C  spine.  old  infarcts    much improved, more  alert/  spoke  with wife/     wife  wants  rehab/,  unable  to take care of  pt     glucos e noted,  stopped  admelog/  follow fs/ pt  is  a  finnicky  eater     echo  from 10/28.  ef  30/   s/p cath, cad.  LAD/ RCA/ pt   had refuse d intervention     staged  PCI on 11/2  and on 11/3   to  RCA  and  LAD    awaiting   d/c  to rehab,  doing well/  d/c order   was  written    hypergycemai in  300/s  today/ endo  to  titrate      d/c  planning in progress              rad< from: CT Abdomen and Pelvis No Cont (10.22.22 @ 15:40) >  IMPRESSION:  Mild bladder wall thickening. Correlate with urinalysis  --- End of Report ---  < end of copied text >      rad< from: Transthoracic Echocardiogram (12.07.21 @ 11:24) >  nclusions:  1. Normal mitral valve. Minimal mitral regurgitation.  2. Calcified trileaflet aortic valve with normal opening.  Minimal aortic regurgitation.  3. Mild segmental left ventricular systolic dysfunction.  The basal inferior, basal septum, distal septum, distal  inferior, and the apex are akinetic. Recommend limited  repeat imaging with intravenous echo contrast to better  visualize the apex. (Per sonographer, patient did not agree  to echo contrast during the study.)  4. Mild diastolic dysfunction (Stage I).  5. Normal right ventricular size and function.  *** No previous Echo exam.    < end of copied text >

## 2022-11-10 NOTE — PROGRESS NOTE ADULT - SUBJECTIVE AND OBJECTIVE BOX
afberile  REVIEW OF SYSTEMS:  GEN: no fever,    no chills  RESP: no SOB,   no cough  CVS: no chest pain,   no palpitations  GI: no abdominal pain,   no nausea,   no vomiting,   no constipation,   no diarrhea  : no dysuria,   no frequency  NEURO: no headache,   no dizziness  PSYCH: no depression,   not anxious  Derm : no rash    MEDICATIONS  (STANDING):  allopurinol 100 milliGRAM(s) Oral daily  aspirin enteric coated 81 milliGRAM(s) Oral daily  atorvastatin 80 milliGRAM(s) Oral at bedtime  carvedilol 25 milliGRAM(s) Oral every 12 hours  clopidogrel Tablet 75 milliGRAM(s) Oral daily  colchicine 0.3 milliGRAM(s) Oral every 48 hours  dextrose 5%. 1000 milliLiter(s) (100 mL/Hr) IV Continuous <Continuous>  dextrose 5%. 1000 milliLiter(s) (50 mL/Hr) IV Continuous <Continuous>  dextrose 50% Injectable 25 Gram(s) IV Push once  dextrose 50% Injectable 12.5 Gram(s) IV Push once  dextrose 50% Injectable 25 Gram(s) IV Push once  dextrose Oral Gel 15 Gram(s) Oral once  gabapentin 300 milliGRAM(s) Oral two times a day  glucagon  Injectable 1 milliGRAM(s) IntraMuscular once  heparin   Injectable 5000 Unit(s) SubCutaneous every 12 hours  insulin glargine Injectable (LANTUS) 10 Unit(s) SubCutaneous at bedtime  insulin lispro (ADMELOG) corrective regimen sliding scale   SubCutaneous at bedtime  insulin lispro (ADMELOG) corrective regimen sliding scale   SubCutaneous three times a day before meals  insulin lispro Injectable (ADMELOG) 5 Unit(s) SubCutaneous three times a day before meals  lidocaine/prilocaine Cream 1 Application(s) Topical <User Schedule>  predniSONE   Tablet 20 milliGRAM(s) Oral daily  tamsulosin 0.4 milliGRAM(s) Oral at bedtime  valsartan 320 milliGRAM(s) Oral daily    MEDICATIONS  (PRN):  acetaminophen     Tablet .. 650 milliGRAM(s) Oral every 6 hours PRN Mild Pain (1 - 3), Moderate Pain (4 - 6)      Vital Signs Last 24 Hrs  T(C): 36.8 (10 Nov 2022 05:04), Max: 37.6 (09 Nov 2022 21:12)  T(F): 98.2 (10 Nov 2022 05:04), Max: 99.7 (09 Nov 2022 21:12)  HR: 81 (10 Nov 2022 05:04) (78 - 81)  BP: 164/62 (10 Nov 2022 05:04) (145/82 - 164/62)  BP(mean): 96 (09 Nov 2022 11:12) (96 - 96)  RR: 18 (10 Nov 2022 05:04) (18 - 18)  SpO2: 98% (10 Nov 2022 05:04) (96% - 98%)    Parameters below as of 10 Nov 2022 05:04  Patient On (Oxygen Delivery Method): room air      CAPILLARY BLOOD GLUCOSE      POCT Blood Glucose.: 339 mg/dL (10 Nov 2022 09:00)  POCT Blood Glucose.: 216 mg/dL (09 Nov 2022 21:41)  POCT Blood Glucose.: 213 mg/dL (09 Nov 2022 17:13)  POCT Blood Glucose.: 206 mg/dL (09 Nov 2022 13:15)    I&O's Summary    09 Nov 2022 07:01  -  10 Nov 2022 07:00  --------------------------------------------------------  IN: 240 mL / OUT: 1000 mL / NET: -760 mL        PHYSICAL EXAM:  HEAD:  Atraumatic, Normocephalic  NECK: Supple, No   JVD  CHEST/LUNG:   no     rales,     no,    rhonchi  HEART: Regular rate and rhythm;         murmur  ABDOMEN: Soft, Nontender, ;   EXTREMITIES:    no    edema  NEUROLOGY:  alert    LABS:                                  Consultant(s) Notes Reviewed:      Care Discussed with Consultants/Other Providers:

## 2022-11-10 NOTE — PROGRESS NOTE ADULT - SUBJECTIVE AND OBJECTIVE BOX
Chief complaint  Patient is a 62y old  Male who presents with a chief complaint of fevers (10 Nov 2022 10:38)   Review of systems  Patient tested covid+, no hypoglycemic episodes.    Labs and Fingersticks  CAPILLARY BLOOD GLUCOSE      POCT Blood Glucose.: 388 mg/dL (10 Nov 2022 13:34)  POCT Blood Glucose.: 339 mg/dL (10 Nov 2022 09:00)  POCT Blood Glucose.: 216 mg/dL (09 Nov 2022 21:41)  POCT Blood Glucose.: 213 mg/dL (09 Nov 2022 17:13)        Medications  MEDICATIONS  (STANDING):  allopurinol 100 milliGRAM(s) Oral daily  amLODIPine   Tablet 5 milliGRAM(s) Oral daily  aspirin enteric coated 81 milliGRAM(s) Oral daily  atorvastatin 80 milliGRAM(s) Oral at bedtime  carvedilol 25 milliGRAM(s) Oral every 12 hours  clopidogrel Tablet 75 milliGRAM(s) Oral daily  colchicine 0.3 milliGRAM(s) Oral every 48 hours  dextrose 5%. 1000 milliLiter(s) (50 mL/Hr) IV Continuous <Continuous>  dextrose 5%. 1000 milliLiter(s) (100 mL/Hr) IV Continuous <Continuous>  dextrose 50% Injectable 25 Gram(s) IV Push once  dextrose 50% Injectable 12.5 Gram(s) IV Push once  dextrose 50% Injectable 25 Gram(s) IV Push once  dextrose Oral Gel 15 Gram(s) Oral once  epoetin wayne-epbx (RETACRIT) Injectable 45763 Unit(s) IV Push once  gabapentin 300 milliGRAM(s) Oral two times a day  glucagon  Injectable 1 milliGRAM(s) IntraMuscular once  heparin   Injectable 5000 Unit(s) SubCutaneous every 12 hours  insulin glargine Injectable (LANTUS) 10 Unit(s) SubCutaneous at bedtime  insulin lispro (ADMELOG) corrective regimen sliding scale   SubCutaneous three times a day before meals  insulin lispro (ADMELOG) corrective regimen sliding scale   SubCutaneous at bedtime  insulin lispro Injectable (ADMELOG) 5 Unit(s) SubCutaneous three times a day before meals  lidocaine/prilocaine Cream 1 Application(s) Topical <User Schedule>  predniSONE   Tablet 20 milliGRAM(s) Oral daily  tamsulosin 0.4 milliGRAM(s) Oral at bedtime  valsartan 320 milliGRAM(s) Oral daily      Physical Exam  Vital Signs Last 12 Hrs  T(F): 98.3 (11-10-22 @ 14:39), Max: 98.3 (11-10-22 @ 14:39)  HR: 81 (11-10-22 @ 14:39) (77 - 81)  BP: 133/69 (11-10-22 @ 14:39) (133/69 - 164/62)  BP(mean): 92 (11-10-22 @ 11:17) (92 - 92)  RR: 18 (11-10-22 @ 14:39) (18 - 18)  SpO2: 95% (11-10-22 @ 14:39) (95% - 98%)      Diagnostics    C-Peptide, Serum: Routine (11-03 @ 10:45)           Chief complaint  Patient is a 62y old  Male who presents with a chief complaint of fevers (10 Nov 2022 10:38)   Review of systems  Patient tested covid+, no hypoglycemic episodes.    Labs and Fingersticks  CAPILLARY BLOOD GLUCOSE      POCT Blood Glucose.: 388 mg/dL (10 Nov 2022 13:34)  POCT Blood Glucose.: 339 mg/dL (10 Nov 2022 09:00)  POCT Blood Glucose.: 216 mg/dL (09 Nov 2022 21:41)  POCT Blood Glucose.: 213 mg/dL (09 Nov 2022 17:13)        Medications  MEDICATIONS  (STANDING):  allopurinol 100 milliGRAM(s) Oral daily  amLODIPine   Tablet 5 milliGRAM(s) Oral daily  aspirin enteric coated 81 milliGRAM(s) Oral daily  atorvastatin 80 milliGRAM(s) Oral at bedtime  carvedilol 25 milliGRAM(s) Oral every 12 hours  clopidogrel Tablet 75 milliGRAM(s) Oral daily  colchicine 0.3 milliGRAM(s) Oral every 48 hours  dextrose 5%. 1000 milliLiter(s) (50 mL/Hr) IV Continuous <Continuous>  dextrose 5%. 1000 milliLiter(s) (100 mL/Hr) IV Continuous <Continuous>  dextrose 50% Injectable 25 Gram(s) IV Push once  dextrose 50% Injectable 12.5 Gram(s) IV Push once  dextrose 50% Injectable 25 Gram(s) IV Push once  dextrose Oral Gel 15 Gram(s) Oral once  epoetin wayne-epbx (RETACRIT) Injectable 62243 Unit(s) IV Push once  gabapentin 300 milliGRAM(s) Oral two times a day  glucagon  Injectable 1 milliGRAM(s) IntraMuscular once  heparin   Injectable 5000 Unit(s) SubCutaneous every 12 hours  insulin glargine Injectable (LANTUS) 10 Unit(s) SubCutaneous at bedtime  insulin lispro (ADMELOG) corrective regimen sliding scale   SubCutaneous three times a day before meals  insulin lispro (ADMELOG) corrective regimen sliding scale   SubCutaneous at bedtime  insulin lispro Injectable (ADMELOG) 5 Unit(s) SubCutaneous three times a day before meals  lidocaine/prilocaine Cream 1 Application(s) Topical <User Schedule>  predniSONE   Tablet 20 milliGRAM(s) Oral daily  tamsulosin 0.4 milliGRAM(s) Oral at bedtime  valsartan 320 milliGRAM(s) Oral daily      Physical Exam  Vital Signs Last 12 Hrs  T(F): 98.3 (11-10-22 @ 14:39), Max: 98.3 (11-10-22 @ 14:39)  HR: 81 (11-10-22 @ 14:39) (77 - 81)  BP: 133/69 (11-10-22 @ 14:39) (133/69 - 164/62)  BP(mean): 92 (11-10-22 @ 11:17) (92 - 92)  RR: 18 (11-10-22 @ 14:39) (18 - 18)  SpO2: 95% (11-10-22 @ 14:39) (95% - 98%)      Diagnostics    C-Peptide, Serum: Routine (11-03 @ 10:45)

## 2022-11-10 NOTE — PROGRESS NOTE ADULT - SUBJECTIVE AND OBJECTIVE BOX
[facility] [phone]        Patient seen and examined in bed.  He was the same and offered no complaints         MEDICATIONS  (STANDING):  allopurinol 100 milliGRAM(s) Oral daily  aspirin enteric coated 81 milliGRAM(s) Oral daily  atorvastatin 80 milliGRAM(s) Oral at bedtime  carvedilol 25 milliGRAM(s) Oral every 12 hours  clopidogrel Tablet 75 milliGRAM(s) Oral daily  colchicine 0.3 milliGRAM(s) Oral every 48 hours  dextrose 5%. 1000 milliLiter(s) (100 mL/Hr) IV Continuous <Continuous>  dextrose 5%. 1000 milliLiter(s) (50 mL/Hr) IV Continuous <Continuous>  dextrose 50% Injectable 25 Gram(s) IV Push once  dextrose 50% Injectable 12.5 Gram(s) IV Push once  dextrose 50% Injectable 25 Gram(s) IV Push once  dextrose Oral Gel 15 Gram(s) Oral once  gabapentin 300 milliGRAM(s) Oral two times a day  glucagon  Injectable 1 milliGRAM(s) IntraMuscular once  heparin   Injectable 5000 Unit(s) SubCutaneous every 12 hours  insulin glargine Injectable (LANTUS) 10 Unit(s) SubCutaneous at bedtime  insulin lispro (ADMELOG) corrective regimen sliding scale   SubCutaneous three times a day before meals  insulin lispro (ADMELOG) corrective regimen sliding scale   SubCutaneous at bedtime  insulin lispro Injectable (ADMELOG) 5 Unit(s) SubCutaneous three times a day before meals  lidocaine/prilocaine Cream 1 Application(s) Topical <User Schedule>  predniSONE   Tablet 20 milliGRAM(s) Oral daily  tamsulosin 0.4 milliGRAM(s) Oral at bedtime  valsartan 320 milliGRAM(s) Oral daily      VITAL:  T(C): , Max: 37.6 (11-09-22 @ 21:12)  T(F): , Max: 99.7 (11-09-22 @ 21:12)  HR: 81 (11-10-22 @ 05:04)  BP: 164/62 (11-10-22 @ 05:04)  BP(mean): 96 (11-09-22 @ 11:12)  RR: 18 (11-10-22 @ 05:04)  SpO2: 98% (11-10-22 @ 05:04)  Wt(kg): --    I and O's:    11-09 @ 07:01  -  11-10 @ 07:00  --------------------------------------------------------  IN: 240 mL / OUT: 1000 mL / NET: -760 mL          PHYSICAL EXAM:    Constitutional: NAD  Neck:  No JVD  Respiratory: CTAB/L  Cardiovascular: S1 and S2  Gastrointestinal: BS+, soft, NT/ND  Extremities: No peripheral edema  Neurological: A/O x 3, no focal deficits  Psychiatric: Normal mood, normal affect  : No Jay  Skin: No rashes  Access: avf    LABS:                Urine Studies:          RADIOLOGY & ADDITIONAL STUDIES:

## 2022-11-11 LAB
GLUCOSE BLDC GLUCOMTR-MCNC: 269 MG/DL — HIGH (ref 70–99)
GLUCOSE BLDC GLUCOMTR-MCNC: 291 MG/DL — HIGH (ref 70–99)
GLUCOSE BLDC GLUCOMTR-MCNC: 319 MG/DL — HIGH (ref 70–99)
GLUCOSE BLDC GLUCOMTR-MCNC: 395 MG/DL — HIGH (ref 70–99)

## 2022-11-11 RX ORDER — ERYTHROPOIETIN 10000 [IU]/ML
10000 INJECTION, SOLUTION INTRAVENOUS; SUBCUTANEOUS ONCE
Refills: 0 | Status: COMPLETED | OUTPATIENT
Start: 2022-11-12 | End: 2022-11-12

## 2022-11-11 RX ORDER — INSULIN LISPRO 100/ML
7 VIAL (ML) SUBCUTANEOUS
Refills: 0 | Status: DISCONTINUED | OUTPATIENT
Start: 2022-11-11 | End: 2022-11-12

## 2022-11-11 RX ORDER — INSULIN GLARGINE 100 [IU]/ML
12 INJECTION, SOLUTION SUBCUTANEOUS AT BEDTIME
Refills: 0 | Status: DISCONTINUED | OUTPATIENT
Start: 2022-11-11 | End: 2022-11-12

## 2022-11-11 RX ADMIN — ATORVASTATIN CALCIUM 80 MILLIGRAM(S): 80 TABLET, FILM COATED ORAL at 22:01

## 2022-11-11 RX ADMIN — GABAPENTIN 300 MILLIGRAM(S): 400 CAPSULE ORAL at 05:25

## 2022-11-11 RX ADMIN — Medication 5 UNIT(S): at 08:11

## 2022-11-11 RX ADMIN — AMLODIPINE BESYLATE 5 MILLIGRAM(S): 2.5 TABLET ORAL at 05:25

## 2022-11-11 RX ADMIN — INSULIN GLARGINE 12 UNIT(S): 100 INJECTION, SOLUTION SUBCUTANEOUS at 22:02

## 2022-11-11 RX ADMIN — Medication 100 MILLIGRAM(S): at 12:03

## 2022-11-11 RX ADMIN — Medication 650 MILLIGRAM(S): at 08:08

## 2022-11-11 RX ADMIN — Medication 7 UNIT(S): at 16:58

## 2022-11-11 RX ADMIN — Medication 3: at 08:06

## 2022-11-11 RX ADMIN — Medication 7 UNIT(S): at 12:05

## 2022-11-11 RX ADMIN — HEPARIN SODIUM 5000 UNIT(S): 5000 INJECTION INTRAVENOUS; SUBCUTANEOUS at 18:34

## 2022-11-11 RX ADMIN — CLOPIDOGREL BISULFATE 75 MILLIGRAM(S): 75 TABLET, FILM COATED ORAL at 12:03

## 2022-11-11 RX ADMIN — CHLORHEXIDINE GLUCONATE 1 APPLICATION(S): 213 SOLUTION TOPICAL at 12:03

## 2022-11-11 RX ADMIN — HEPARIN SODIUM 5000 UNIT(S): 5000 INJECTION INTRAVENOUS; SUBCUTANEOUS at 05:24

## 2022-11-11 RX ADMIN — GABAPENTIN 300 MILLIGRAM(S): 400 CAPSULE ORAL at 18:34

## 2022-11-11 RX ADMIN — Medication 4: at 12:05

## 2022-11-11 RX ADMIN — CARVEDILOL PHOSPHATE 25 MILLIGRAM(S): 80 CAPSULE, EXTENDED RELEASE ORAL at 18:34

## 2022-11-11 RX ADMIN — Medication 1: at 22:03

## 2022-11-11 RX ADMIN — REMDESIVIR 500 MILLIGRAM(S): 5 INJECTION INTRAVENOUS at 18:32

## 2022-11-11 RX ADMIN — VALSARTAN 320 MILLIGRAM(S): 80 TABLET ORAL at 05:25

## 2022-11-11 RX ADMIN — Medication 20 MILLIGRAM(S): at 05:25

## 2022-11-11 RX ADMIN — Medication 0.3 MILLIGRAM(S): at 18:33

## 2022-11-11 RX ADMIN — TAMSULOSIN HYDROCHLORIDE 0.4 MILLIGRAM(S): 0.4 CAPSULE ORAL at 22:01

## 2022-11-11 RX ADMIN — Medication 5: at 16:58

## 2022-11-11 RX ADMIN — Medication 81 MILLIGRAM(S): at 12:03

## 2022-11-11 RX ADMIN — Medication 650 MILLIGRAM(S): at 10:00

## 2022-11-11 RX ADMIN — CARVEDILOL PHOSPHATE 25 MILLIGRAM(S): 80 CAPSULE, EXTENDED RELEASE ORAL at 05:25

## 2022-11-11 NOTE — PROGRESS NOTE ADULT - SUBJECTIVE AND OBJECTIVE BOX
Chief complaint  Patient is a 62y old  Male who presents with a chief complaint of fevers (11 Nov 2022 08:59)   Review of systems  Patient in covid unit, awake in bed, looks comfortable, no hypoglycemic episodes.    Labs and Fingersticks  CAPILLARY BLOOD GLUCOSE      POCT Blood Glucose.: 291 mg/dL (11 Nov 2022 07:43)  POCT Blood Glucose.: 282 mg/dL (10 Nov 2022 21:48)  POCT Blood Glucose.: 376 mg/dL (10 Nov 2022 16:45)  POCT Blood Glucose.: 388 mg/dL (10 Nov 2022 13:34)          Albumin, Serum: 2.8 *L* (11-10 @ 20:09)    Alanine Aminotransferase (ALT/SGPT): 12 (11-10 @ 20:09)  Alkaline Phosphatase, Serum: 124 *H* (11-10 @ 20:09)  Aspartate Aminotransferase (AST/SGOT): 12 (11-10 @ 20:09)        11-10    x   |  x   |  x   ----------------------------<  x   x    |  x   |  2.10<H>      TPro  6.2  /  Alb  2.8<L>  /  TBili  0.2  /  DBili  <0.1  /  AST  12  /  ALT  12  /  AlkPhos  124<H>  11-10    Medications  MEDICATIONS  (STANDING):  allopurinol 100 milliGRAM(s) Oral daily  amLODIPine   Tablet 5 milliGRAM(s) Oral daily  aspirin enteric coated 81 milliGRAM(s) Oral daily  atorvastatin 80 milliGRAM(s) Oral at bedtime  carvedilol 25 milliGRAM(s) Oral every 12 hours  chlorhexidine 2% Cloths 1 Application(s) Topical daily  clopidogrel Tablet 75 milliGRAM(s) Oral daily  colchicine 0.3 milliGRAM(s) Oral every 48 hours  dextrose 5%. 1000 milliLiter(s) (50 mL/Hr) IV Continuous <Continuous>  dextrose 5%. 1000 milliLiter(s) (100 mL/Hr) IV Continuous <Continuous>  dextrose 50% Injectable 25 Gram(s) IV Push once  dextrose 50% Injectable 12.5 Gram(s) IV Push once  dextrose 50% Injectable 25 Gram(s) IV Push once  dextrose Oral Gel 15 Gram(s) Oral once  gabapentin 300 milliGRAM(s) Oral two times a day  glucagon  Injectable 1 milliGRAM(s) IntraMuscular once  heparin   Injectable 5000 Unit(s) SubCutaneous every 12 hours  insulin glargine Injectable (LANTUS) 12 Unit(s) SubCutaneous at bedtime  insulin lispro (ADMELOG) corrective regimen sliding scale   SubCutaneous at bedtime  insulin lispro (ADMELOG) corrective regimen sliding scale   SubCutaneous three times a day before meals  insulin lispro Injectable (ADMELOG) 7 Unit(s) SubCutaneous three times a day before meals  lidocaine/prilocaine Cream 1 Application(s) Topical <User Schedule>  predniSONE   Tablet 20 milliGRAM(s) Oral daily  remdesivir  IVPB   IV Intermittent   remdesivir  IVPB 100 milliGRAM(s) IV Intermittent every 24 hours  tamsulosin 0.4 milliGRAM(s) Oral at bedtime  valsartan 320 milliGRAM(s) Oral daily      Physical Exam  General: Patient comfortable in bed  Vital Signs Last 12 Hrs  T(F): 98.1 (11-11-22 @ 04:13), Max: 98.1 (11-11-22 @ 04:13)  HR: 72 (11-11-22 @ 04:13) (72 - 79)  BP: 120/76 (11-11-22 @ 04:13) (120/76 - 145/78)  BP(mean): --  RR: 18 (11-11-22 @ 04:13) (18 - 18)  SpO2: 98% (11-11-22 @ 04:13) (98% - 98%)      Diagnostics    C-Peptide, Serum: Routine (11-03 @ 10:45)           Chief complaint  Patient is a 62y old  Male who presents with a chief complaint of fevers (11 Nov 2022 08:59)   Review of systems  Patient in covid unit, awake in bed, looks comfortable, no hypoglycemic episodes.    Labs and Fingersticks  CAPILLARY BLOOD GLUCOSE      POCT Blood Glucose.: 291 mg/dL (11 Nov 2022 07:43)  POCT Blood Glucose.: 282 mg/dL (10 Nov 2022 21:48)  POCT Blood Glucose.: 376 mg/dL (10 Nov 2022 16:45)  POCT Blood Glucose.: 388 mg/dL (10 Nov 2022 13:34)          Albumin, Serum: 2.8 *L* (11-10 @ 20:09)    Alanine Aminotransferase (ALT/SGPT): 12 (11-10 @ 20:09)  Alkaline Phosphatase, Serum: 124 *H* (11-10 @ 20:09)  Aspartate Aminotransferase (AST/SGOT): 12 (11-10 @ 20:09)        11-10    x   |  x   |  x   ----------------------------<  x   x    |  x   |  2.10<H>      TPro  6.2  /  Alb  2.8<L>  /  TBili  0.2  /  DBili  <0.1  /  AST  12  /  ALT  12  /  AlkPhos  124<H>  11-10    Medications  MEDICATIONS  (STANDING):  allopurinol 100 milliGRAM(s) Oral daily  amLODIPine   Tablet 5 milliGRAM(s) Oral daily  aspirin enteric coated 81 milliGRAM(s) Oral daily  atorvastatin 80 milliGRAM(s) Oral at bedtime  carvedilol 25 milliGRAM(s) Oral every 12 hours  chlorhexidine 2% Cloths 1 Application(s) Topical daily  clopidogrel Tablet 75 milliGRAM(s) Oral daily  colchicine 0.3 milliGRAM(s) Oral every 48 hours  dextrose 5%. 1000 milliLiter(s) (50 mL/Hr) IV Continuous <Continuous>  dextrose 5%. 1000 milliLiter(s) (100 mL/Hr) IV Continuous <Continuous>  dextrose 50% Injectable 25 Gram(s) IV Push once  dextrose 50% Injectable 12.5 Gram(s) IV Push once  dextrose 50% Injectable 25 Gram(s) IV Push once  dextrose Oral Gel 15 Gram(s) Oral once  gabapentin 300 milliGRAM(s) Oral two times a day  glucagon  Injectable 1 milliGRAM(s) IntraMuscular once  heparin   Injectable 5000 Unit(s) SubCutaneous every 12 hours  insulin glargine Injectable (LANTUS) 12 Unit(s) SubCutaneous at bedtime  insulin lispro (ADMELOG) corrective regimen sliding scale   SubCutaneous at bedtime  insulin lispro (ADMELOG) corrective regimen sliding scale   SubCutaneous three times a day before meals  insulin lispro Injectable (ADMELOG) 7 Unit(s) SubCutaneous three times a day before meals  lidocaine/prilocaine Cream 1 Application(s) Topical <User Schedule>  predniSONE   Tablet 20 milliGRAM(s) Oral daily  remdesivir  IVPB   IV Intermittent   remdesivir  IVPB 100 milliGRAM(s) IV Intermittent every 24 hours  tamsulosin 0.4 milliGRAM(s) Oral at bedtime  valsartan 320 milliGRAM(s) Oral daily      Physical Exam  General: Patient comfortable in bed  Vital Signs Last 12 Hrs  T(F): 98.1 (11-11-22 @ 04:13), Max: 98.1 (11-11-22 @ 04:13)  HR: 72 (11-11-22 @ 04:13) (72 - 79)  BP: 120/76 (11-11-22 @ 04:13) (120/76 - 145/78)  BP(mean): --  RR: 18 (11-11-22 @ 04:13) (18 - 18)  SpO2: 98% (11-11-22 @ 04:13) (98% - 98%)      Diagnostics    C-Peptide, Serum: Routine (11-03 @ 10:45)

## 2022-11-11 NOTE — PROGRESS NOTE ADULT - SUBJECTIVE AND OBJECTIVE BOX
[facility] [phone]        Patient seen and examined in bed.  He was the same  No constitutional sx with respect to cough         MEDICATIONS  (STANDING):  allopurinol 100 milliGRAM(s) Oral daily  amLODIPine   Tablet 5 milliGRAM(s) Oral daily  aspirin enteric coated 81 milliGRAM(s) Oral daily  atorvastatin 80 milliGRAM(s) Oral at bedtime  carvedilol 25 milliGRAM(s) Oral every 12 hours  chlorhexidine 2% Cloths 1 Application(s) Topical daily  clopidogrel Tablet 75 milliGRAM(s) Oral daily  colchicine 0.3 milliGRAM(s) Oral every 48 hours  dextrose 5%. 1000 milliLiter(s) (50 mL/Hr) IV Continuous <Continuous>  dextrose 5%. 1000 milliLiter(s) (100 mL/Hr) IV Continuous <Continuous>  dextrose 50% Injectable 25 Gram(s) IV Push once  dextrose 50% Injectable 12.5 Gram(s) IV Push once  dextrose 50% Injectable 25 Gram(s) IV Push once  dextrose Oral Gel 15 Gram(s) Oral once  gabapentin 300 milliGRAM(s) Oral two times a day  glucagon  Injectable 1 milliGRAM(s) IntraMuscular once  heparin   Injectable 5000 Unit(s) SubCutaneous every 12 hours  insulin glargine Injectable (LANTUS) 12 Unit(s) SubCutaneous at bedtime  insulin lispro (ADMELOG) corrective regimen sliding scale   SubCutaneous at bedtime  insulin lispro (ADMELOG) corrective regimen sliding scale   SubCutaneous three times a day before meals  insulin lispro Injectable (ADMELOG) 7 Unit(s) SubCutaneous three times a day before meals  lidocaine/prilocaine Cream 1 Application(s) Topical <User Schedule>  predniSONE   Tablet 20 milliGRAM(s) Oral daily  remdesivir  IVPB   IV Intermittent   remdesivir  IVPB 100 milliGRAM(s) IV Intermittent every 24 hours  tamsulosin 0.4 milliGRAM(s) Oral at bedtime  valsartan 320 milliGRAM(s) Oral daily      VITAL:  T(C): , Max: 36.8 (11-10-22 @ 14:39)  T(F): , Max: 98.3 (11-10-22 @ 14:39)  HR: 75 (11-11-22 @ 10:44)  BP: 113/61 (11-11-22 @ 10:44)  BP(mean): 92 (11-10-22 @ 11:17)  RR: 18 (11-11-22 @ 10:44)  SpO2: 95% (11-11-22 @ 10:44)  Wt(kg): --    I and O's:    11-10 @ 07:01  -  11-11 @ 07:00  --------------------------------------------------------  IN: 120 mL / OUT: 1000 mL / NET: -880 mL          PHYSICAL EXAM:    Constitutional: NAD  Neck:  No JVD  Respiratory: CTAB/L  Cardiovascular: S1 and S2  Gastrointestinal: BS+, soft, NT/ND  Extremities: No peripheral edema  Neurological: A/O x 3, no focal deficits  Psychiatric: Normal mood, normal affect  : No Jay  Skin: No rashes  Access: avf    LABS:    11-10    x   |  x   |  x   ----------------------------<  x   x    |  x   |  2.10<H>      TPro  6.2  /  Alb  2.8<L>  /  TBili  0.2  /  DBili  <0.1  /  AST  12  /  ALT  12  /  AlkPhos  124<H>  11-10          Urine Studies:          RADIOLOGY & ADDITIONAL STUDIES:

## 2022-11-11 NOTE — PROGRESS NOTE ADULT - ASSESSMENT
ASSESSMENT:  (1)ESRD  (2)COVID  +   (3)Triple vessel disease sp stent     RECOMMEND:  (1)HD on Sat   (2)SP Cardiac stent and DAPT for 6 months    (3)Increase Valsartan 320mg po qd;    Norvasc added for added BP control   (4)Dose new meds for GFR <10  (5) ID eval called noted-Remdesivir and Pred      [facility]  [phone]

## 2022-11-11 NOTE — PROVIDER CONTACT NOTE (OTHER) - REASON
PAT for 2.4 seconds w/
Pt blood sugar recheck
Vanco trough results, 9.9
Pt feels body aches
Bedtime sliding scale order for insulin
R. arm pain

## 2022-11-11 NOTE — PROVIDER CONTACT NOTE (OTHER) - ASSESSMENT
Pt a&ox3-4. VSS: /61, HR 75, O2 95% on room air, temp 98.1. Pt asymptomatic: denies chest pain or palpitations.
Pt A+O x 4 and VS are stable. Pt .
Pain a 5 with activity. Pain 0 at rest
Pt A+O x4, no symptoms of fever.
Pt A+O x 4, VS stable. BS recheck shows sugar at 363.
Pt A+O x 4, forgetful. VS stable.

## 2022-11-11 NOTE — PROVIDER CONTACT NOTE (OTHER) - BACKGROUND
Pt admitted for altered mental status.
Pt admitted with altered mental status.
Pt comes in for metabolic encephalopathy, leukocytosis, ESRD, HF/CAD. Stent placed 11/2 and 11/3 through the R. and L. groin respectively. Dialysis with L. [facility] Sat.
Pt admitted for altered mental status
Pt admitted for metabolic encephalopathy. PMH CKD, DKA, HTN, CAD.
Pt admitted with altered mental state.

## 2022-11-11 NOTE — PROVIDER CONTACT NOTE (OTHER) - ACTION/TREATMENT ORDERED:
Provider notified.
PRN PO Tylenol 650mg ordered
Provider notified, first dose of vanco will be given
Provider notified, one dose of tylenols prescribed.
NP Vidal Little notified and aware. As per NP, no intervention at this time. Continue to monitor on tele.
Provider notified, bedtime insulin is ordered. Recheck sugar in 30 minutes.

## 2022-11-11 NOTE — PROGRESS NOTE ADULT - SUBJECTIVE AND OBJECTIVE BOX
afberile    REVIEW OF SYSTEMS:  GEN: no fever,    no chills  RESP: no SOB,   no cough  CVS: no chest pain,   no palpitations  GI: no abdominal pain,   no nausea,   no vomiting,   no constipation,   no diarrhea  : no dysuria,   no frequency  NEURO: no headache,   no dizziness  PSYCH: no depression,   not anxious  Derm : no rash    MEDICATIONS  (STANDING):  allopurinol 100 milliGRAM(s) Oral daily  amLODIPine   Tablet 5 milliGRAM(s) Oral daily  aspirin enteric coated 81 milliGRAM(s) Oral daily  atorvastatin 80 milliGRAM(s) Oral at bedtime  carvedilol 25 milliGRAM(s) Oral every 12 hours  chlorhexidine 2% Cloths 1 Application(s) Topical daily  clopidogrel Tablet 75 milliGRAM(s) Oral daily  colchicine 0.3 milliGRAM(s) Oral every 48 hours  dextrose 5%. 1000 milliLiter(s) (50 mL/Hr) IV Continuous <Continuous>  dextrose 5%. 1000 milliLiter(s) (100 mL/Hr) IV Continuous <Continuous>  dextrose 50% Injectable 25 Gram(s) IV Push once  dextrose 50% Injectable 12.5 Gram(s) IV Push once  dextrose 50% Injectable 25 Gram(s) IV Push once  dextrose Oral Gel 15 Gram(s) Oral once  gabapentin 300 milliGRAM(s) Oral two times a day  glucagon  Injectable 1 milliGRAM(s) IntraMuscular once  heparin   Injectable 5000 Unit(s) SubCutaneous every 12 hours  insulin glargine Injectable (LANTUS) 12 Unit(s) SubCutaneous at bedtime  insulin lispro (ADMELOG) corrective regimen sliding scale   SubCutaneous at bedtime  insulin lispro (ADMELOG) corrective regimen sliding scale   SubCutaneous three times a day before meals  insulin lispro Injectable (ADMELOG) 7 Unit(s) SubCutaneous three times a day before meals  lidocaine/prilocaine Cream 1 Application(s) Topical <User Schedule>  predniSONE   Tablet 20 milliGRAM(s) Oral daily  remdesivir  IVPB   IV Intermittent   remdesivir  IVPB 100 milliGRAM(s) IV Intermittent every 24 hours  tamsulosin 0.4 milliGRAM(s) Oral at bedtime  valsartan 320 milliGRAM(s) Oral daily    MEDICATIONS  (PRN):  acetaminophen     Tablet .. 650 milliGRAM(s) Oral every 6 hours PRN Mild Pain (1 - 3), Moderate Pain (4 - 6)      Vital Signs Last 24 Hrs  T(C): 36.7 (11 Nov 2022 04:13), Max: 36.8 (10 Nov 2022 14:39)  T(F): 98.1 (11 Nov 2022 04:13), Max: 98.3 (10 Nov 2022 14:39)  HR: 72 (11 Nov 2022 04:13) (71 - 81)  BP: 120/76 (11 Nov 2022 04:13) (119/59 - 151/62)  BP(mean): 92 (10 Nov 2022 11:17) (92 - 92)  RR: 18 (11 Nov 2022 04:13) (17 - 18)  SpO2: 98% (11 Nov 2022 04:13) (95% - 98%)    Parameters below as of 11 Nov 2022 04:13  Patient On (Oxygen Delivery Method): room air      CAPILLARY BLOOD GLUCOSE      POCT Blood Glucose.: 291 mg/dL (11 Nov 2022 07:43)  POCT Blood Glucose.: 282 mg/dL (10 Nov 2022 21:48)  POCT Blood Glucose.: 376 mg/dL (10 Nov 2022 16:45)  POCT Blood Glucose.: 388 mg/dL (10 Nov 2022 13:34)  POCT Blood Glucose.: 339 mg/dL (10 Nov 2022 09:00)    I&O's Summary    10 Nov 2022 07:01  -  11 Nov 2022 07:00  --------------------------------------------------------  IN: 120 mL / OUT: 1000 mL / NET: -880 mL        PHYSICAL EXAM:  HEAD:  Atraumatic, Normocephalic  NECK: Supple, No   JVD  CHEST/LUNG:   no     rales,     no,    rhonchi  HEART: Regular rate and rhythm;         murmur  ABDOMEN: Soft, Nontender, ;   EXTREMITIES:  no      edema  NEUROLOGY:  alert    LABS:    11-10    x   |  x   |  x   ----------------------------<  x   x    |  x   |  2.10<H>      TPro  6.2  /  Alb  2.8<L>  /  TBili  0.2  /  DBili  <0.1  /  AST  12  /  ALT  12  /  AlkPhos  124<H>  11-10    PT/INR - ( 10 Nov 2022 20:09 )   PT: 12.8 sec;   INR: 1.10 ratio                                 Consultant(s) Notes Reviewed:      Care Discussed with Consultants/Other Providers:

## 2022-11-11 NOTE — PROGRESS NOTE ADULT - ASSESSMENT
2 yom   hx of CKD,   s/p DKA, DM.     cardiomyopathy,  CAD w/ stents, HTN, gout     generalized  weakness.  with   c/c h/o b/l leg weakness/ and   b/l arm weakness, r > l      p/w days of generalized weakness and AMS    per wife, pt was just in [facility] for DKA.  and  was d/c'd w/ fever and still weak.     more lethargic and altered /  resolved           *   pt  admitted  with fevers/ ams. / metabolic  encephalopathy   on  arrival, wbc  was12,000       bcx/ ucx,  was negative      no source of infection found        was on   Zosyn  ,  Ct  a/p,   ? cystitis/   ID eval dr harrington    * CKD,  crt is 5,4/ ,  renal  dr dickerson     *   c/c  anemia     *   DM,  follow  fs        on lantus/  known to  [facility]     *  ef 40     *   c/c  weakness  of   limbs/  neuro [facility]ain       has  functional  quadriplegia/  CT  head, . old  arachnoid  cyst/  no intervention      *    acute  gout, right  wrist, on  prednisone/ colchicine     on dvt  ppx     MRI  head/  C  spine.  old  infarcts    much improved, more  alert/  spoke  with wife/     wife  wants  rehab/,  unable  to take care of  pt     glucos e noted,  stopped  admelog/  follow fs/ pt  is  a  finnicky  eater     echo  from 10/28.  ef  30/   s/p cath, cad.  LAD/ RCA/ pt   had refuse d intervention     staged  PCI on 11/2  and on 11/3   to  RCA  and  LAD    awaiting   d/c  to rehab,  doing well/  d/c order   was  written.  now pt is covid  +. hence  not transferred     have to isolate/  wife awrae/ pt  doing  well          rad< from: CT Abdomen and Pelvis No Cont (10.22.22 @ 15:40) >  IMPRESSION:  Mild bladder wall thickening. Correlate with urinalysis  --- End of Report ---  < end of copied text >      rad< from: Transthoracic Echocardiogram (12.07.21 @ 11:24) >  nclusions:  1. Normal mitral valve. Minimal mitral regurgitation.  2. Calcified trileaflet aortic valve with normal opening.  Minimal aortic regurgitation.  3. Mild segmental left ventricular systolic dysfunction.  The basal inferior, basal septum, distal septum, distal  inferior, and the apex are akinetic. Recommend limited  repeat imaging with intravenous echo contrast to better  visualize the apex. (Per sonographer, patient did not agree  to echo contrast during the study.)  4. Mild diastolic dysfunction (Stage I).  5. Normal right ventricular size and function.  *** No previous Echo exam.    < end of copied text >

## 2022-11-11 NOTE — PROVIDER CONTACT NOTE (OTHER) - SITUATION
Pt is a new admission and does not currently have a order for bedtime sliding scale insulin. The blood sugar result was 375, as per sliding scale 5 units was given with lantus.
Pt vanco trough is 9.9
Pt stated he feels body aches, no fever present.
Pt received lantus plus sliding scale insulin at night.
Pt. c/o R. arm pain, Requests Tylenol
Pt had PAT on tele lasting 2.4 seconds w/HR up to 134.

## 2022-11-11 NOTE — PROGRESS NOTE ADULT - SUBJECTIVE AND OBJECTIVE BOX
CC: f/u for  covid  Patient reports feels ok, cough not bad    REVIEW OF SYSTEMS:  All other review of systems negative (Comprehensive ROS)    Antimicrobials Day #  :2/3  remdesivir  IVPB   IV Intermittent   remdesivir  IVPB 100 milliGRAM(s) IV Intermittent every 24 hours    Other Medications Reviewed    T(F): 98.1 (11-11-22 @ 10:44), Max: 98.1 (11-11-22 @ 04:13)  HR: 80 (11-11-22 @ 17:10)  BP: 132/68 (11-11-22 @ 17:10)  RR: 18 (11-11-22 @ 10:44)  SpO2: 95% (11-11-22 @ 10:44)  Wt(kg): --    PHYSICAL EXAM:  General: alert, no acute distress  Eyes:  anicteric, no conjunctival injection, no discharge  Oropharynx: no lesions or injection 	  Neck: supple, without adenopathy  Lungs: clear to auscultation  Heart: regular rate and rhythm; no murmur, rubs or gallops  Abdomen: soft, nondistended, nontender, without mass or organomegaly  Skin: no lesions  Extremities: no clubbing, cyanosis, or edema  Neurologic: alert, oriented, moves all extremities    LAB RESULTS:    11-10    x   |  x   |  x   ----------------------------<  x   x    |  x   |  2.10<H>      TPro  6.2  /  Alb  2.8<L>  /  TBili  0.2  /  DBili  <0.1  /  AST  12  /  ALT  12  /  AlkPhos  124<H>  11-10    LIVER FUNCTIONS - ( 10 Nov 2022 20:09 )  Alb: 2.8 g/dL / Pro: 6.2 g/dL / ALK PHOS: 124 U/L / ALT: 12 U/L / AST: 12 U/L / GGT: x             MICROBIOLOGY:  RECENT CULTURES:      RADIOLOGY REVIEWED:    < from: MR Head No Cont (10.25.22 @ 22:11) >  ACC: [id number] EXAM:  MR SPINE CERVICAL                        ACC: [id number] EXAM:  MR BRAIN                          PROCEDURE DATE:  10/25/2022          INTERPRETATION:  INTERPRETATION:  MRI BRAIN    CLINICAL INDICATION: Altered mental status. Weakness.    MRI OF THE BRAIN WITHOUT CONTRAST:    DATE:  5/24/2022      TECHNIQUE:  Axial SPGR, axial FSE T2-weighted, axial FLAIR, sagittal T1 FLAIR, axial   GRE, and axial diffusion-weighted images of the brain were obtained   without the use of contrast.    COMPARISON: 7/28/2018    FINDINGS: Prominence of the ventricles and subarachnoid spaces,   compatible with atrophy. Periventricular small vessel white matter   ischemic changes are appreciated. Left middle cranial fossa arachnoid   cyst measuring 2.9 cm x 2.0 cm, unchanged in appearance compared with   prior.    No acute intracranial hemorrhage, intraparenchymal mass lesion or midline   shift. Old left basal ganglia infarct.    Magnetic susceptibility artifact noted in the region of the basal ganglia   bilaterally, likely related to deposition of calcium in this location.    Sella turcica is unremarkable in appearance.    Bilateral optic globes are intact. Mucosal thickening along the inferior   aspect of bilateral maxillary sinuses. Small left maxillary sinus polyp   versus retention cysts. Mild mucosal thickening along the anterior aspect   of the bilateral ethmoid air cells. Very minimal inflammatory changes   within left-sided mastoid air cells.      IMPRESSION: Please see below      MRI OF THE CERVICAL SPINE WITHOUT CONTRAST    INDICATIONS: Sensory intact to light touch. Absent proprioception in   toes. Distal atrophy. Bilateral dysmetria.      TECHNIQUE:    Sagittal T1, sagittal T2, sagittal inversion recovery, axial 3D FIESTA,   and axial T1 images are obtained of the cervical spine without the use of   contrast.    COMPARISON:  None available at this time.      FINDINGS:    Straightening of the normal lordotic curvature.    Prevertebral soft tissue is normal in thickness. No abnormal signal   intensity.    No evidence of anterior or posterior ligamentous injury.    There is no abnormal T2 prolongation within the intramedullary intradural   cervical spinal cord. No spinal canal mass is seen.    C2-C3: No significant spinal canal stenosis or neural foraminal narrowing.    C3-C4: Minimal posterior osteophyte formation mildly impresses on the   ventral thecal sac. No spinal cord compression. Mild to moderate left   neural foraminal narrowing.    C4-C5: No significant spinal canal stenosis. Mild to moderate left neural   foraminal narrowing.    C5-C6: Mild broad-based bulging of disc material mildly impresses on the   ventral thecal sac, but not the spinal cord. Mild to moderate left neural   foraminal narrowing.    C6-C7: There is a small disc osteophyte complex which minimally impresses   on the ventral thecal sac, but not the spinal cord. Mild right and mild   to moderate left neural foraminal narrowing.    C7-T1: No significant spinal canal stenosis or neural foraminal narrowing.    More prominent bulging of disc material is appreciated at T2-T3, with   questionable encroachment on the spinal cord. Please note that axial   imaging was not obtained through this level and further dedicated imaging   of the thoracic spine may be considered, as clinically indicated.      IMPRESSION:    Degenerative changes. No significant canal or neural foraminal stenosis.      MRI OF THE BRAIN:    1. Overall, findings are stable in appearance compared with earlier   examination dated 7/28/2018.  2. Atrophy, old left basal ganglial lacunar infarct and left middle   cranial fossa arachnoid cyst.  3. No restricted diffusion. No acute ischemic event.    MRI OF THE CERVICAL SPINE:  1. No acute fracture or AP plane subluxation.  2. No abnormal intramedullary T2 increased signal within imaged portions   of the cervical spinal cord.  3. Mild degenerative changes, as described in detail above.  4. More prominent bulging of disc material is appreciated at T2-T3, with   questionable encroachment on the spinal cord. Please note that axial   imaging was not obtained through this level and further dedicated imaging   of the thoracic spine may be considered, as clinically indicated.    --- End of Report ---            DAWN BEHR-VENTURA MD; Attending Radiologist  This document has been electronically signed. Oct 26 2022  2:38PM    < end of copied text >            Assessment:  Patient with dm, ckd, , ischemic cardiomyopathy, admitted very encephalopathic from uremia and febrile from acute gout. Now on dialysis, gout controlled. Found covid pos, mild symptoms with cough, no hypoxia to finish 3 d of rdv  Plan:  1 more day of rdv  monitor liver enzymes, sats  dvt prohylsaxis

## 2022-11-11 NOTE — PROGRESS NOTE ADULT - SUBJECTIVE AND OBJECTIVE BOX
CARDIOLOGY     PROGRESS  NOTE   ________________________________________________    CHIEF COMPLAINT:Patient is a 62y old  Male who presents with a chief complaint of fevers (10 Nov 2022 18:31)  no complain  	  REVIEW OF SYSTEMS:  CONSTITUTIONAL: No fever, weight loss, or fatigue  EYES: No eye pain, visual disturbances, or discharge  ENT:  No difficulty hearing, tinnitus, vertigo; No sinus or throat pain  NECK: No pain or stiffness  RESPIRATORY: No cough, wheezing, chills or hemoptysis; No Shortness of Breath  CARDIOVASCULAR: No chest pain, palpitations, passing out, dizziness, or leg swelling  GASTROINTESTINAL: No abdominal or epigastric pain. No nausea, vomiting, or hematemesis; No diarrhea or constipation. No melena or hematochezia.  GENITOURINARY: No dysuria, frequency, hematuria, or incontinence  NEUROLOGICAL: No headaches, memory loss, loss of strength, numbness, or tremors  SKIN: No itching, burning, rashes, or lesions   LYMPH Nodes: No enlarged glands  ENDOCRINE: No heat or cold intolerance; No hair loss  MUSCULOSKELETAL: No joint pain or swelling; No muscle, back, or extremity pain  PSYCHIATRIC: No depression, anxiety, mood swings, or difficulty sleeping  HEME/LYMPH: No easy bruising, or bleeding gums  ALLERGY AND IMMUNOLOGIC: No hives or eczema	    [ x] All others negative	  [ ] Unable to obtain    PHYSICAL EXAM:  T(C): 36.7 (11-11-22 @ 04:13), Max: 36.8 (11-10-22 @ 14:39)  HR: 72 (11-11-22 @ 04:13) (71 - 81)  BP: 120/76 (11-11-22 @ 04:13) (119/59 - 151/62)  RR: 18 (11-11-22 @ 04:13) (17 - 18)  SpO2: 98% (11-11-22 @ 04:13) (95% - 98%)  Wt(kg): --  I&O's Summary    10 Nov 2022 07:01  -  11 Nov 2022 07:00  --------------------------------------------------------  IN: 120 mL / OUT: 1000 mL / NET: -880 mL        Appearance: Normal	  HEENT:   Normal oral mucosa, PERRL, EOMI	  Lymphatic: No lymphadenopathy  Cardiovascular: Normal S1 S2, No JVD, + murmurs, No edema  Respiratory:rhonchi  Psychiatry: A & O x 3, Mood & affect appropriate  Gastrointestinal:  Soft, Non-tender, + BS	  Skin: No rashes, No ecchymoses, No cyanosis	  Neurologic: Non-focal  Extremities: Normal range of motion, No clubbing, cyanosis or edema  Vascular: Peripheral pulses palpable 2+ bilaterally    MEDICATIONS  (STANDING):  allopurinol 100 milliGRAM(s) Oral daily  amLODIPine   Tablet 5 milliGRAM(s) Oral daily  aspirin enteric coated 81 milliGRAM(s) Oral daily  atorvastatin 80 milliGRAM(s) Oral at bedtime  carvedilol 25 milliGRAM(s) Oral every 12 hours  chlorhexidine 2% Cloths 1 Application(s) Topical daily  clopidogrel Tablet 75 milliGRAM(s) Oral daily  colchicine 0.3 milliGRAM(s) Oral every 48 hours  dextrose 5%. 1000 milliLiter(s) (50 mL/Hr) IV Continuous <Continuous>  dextrose 5%. 1000 milliLiter(s) (100 mL/Hr) IV Continuous <Continuous>  dextrose 50% Injectable 25 Gram(s) IV Push once  dextrose 50% Injectable 12.5 Gram(s) IV Push once  dextrose 50% Injectable 25 Gram(s) IV Push once  dextrose Oral Gel 15 Gram(s) Oral once  gabapentin 300 milliGRAM(s) Oral two times a day  glucagon  Injectable 1 milliGRAM(s) IntraMuscular once  heparin   Injectable 5000 Unit(s) SubCutaneous every 12 hours  insulin glargine Injectable (LANTUS) 10 Unit(s) SubCutaneous at bedtime  insulin lispro (ADMELOG) corrective regimen sliding scale   SubCutaneous three times a day before meals  insulin lispro (ADMELOG) corrective regimen sliding scale   SubCutaneous at bedtime  insulin lispro Injectable (ADMELOG) 5 Unit(s) SubCutaneous three times a day before meals  lidocaine/prilocaine Cream 1 Application(s) Topical <User Schedule>  predniSONE   Tablet 20 milliGRAM(s) Oral daily  remdesivir  IVPB   IV Intermittent   remdesivir  IVPB 100 milliGRAM(s) IV Intermittent every 24 hours  tamsulosin 0.4 milliGRAM(s) Oral at bedtime  valsartan 320 milliGRAM(s) Oral daily      TELEMETRY: 	    ECG:  	  RADIOLOGY:  OTHER: 	  	  LABS:	 	    CARDIAC MARKERS:            11-10    x   |  x   |  x   ----------------------------<  x   x    |  x   |  2.10<H>      TPro  6.2  /  Alb  2.8<L>  /  TBili  0.2  /  DBili  <0.1  /  AST  12  /  ALT  12  /  AlkPhos  124<H>  11-10    proBNP:   Lipid Profile:   HgA1c:   TSH:   PT/INR - ( 10 Nov 2022 20:09 )   PT: 12.8 sec;   INR: 1.10 ratio               Assessment and plan  ---------------------------  62 yom hx of CKD, DKA, DMtype2, cardiomyopathy w/ stents, HTN, gout p/w days of generalized weakness and AMS. Per wife, pt was just in [place] for DKA. Was d/c'd w/ fever and still weak. Since tuesday, pt becoming more lethargic and altered especially when he waking up in morning. BG have been in the 200s since per cont BG monitor. Subjective fevers at home, wife feels that his abd is larger than it was before d/c from [place]. Question of if pt has heather compliant w/ short and long acting insulin as wife works nights and is not there to give all meds at home. Unclear if pt has been falling at home but w/ bruising on his abd.   pt with hx of ashd, chf, cardiomyopathy, dm, s/p CARDIOMEM fu at [facility] with change of mental stratus and sob.  ct scan noted , no active chf  will adjust cardiac meds  will call chf team at [place] to get Cardiomem readings  beta blocker/ hydralazine and Nitrate  ckd, renal follow up  check pro bnp  dvt prophylaxis  pt with known hx of cad, last stress test  in 07/2021, no ischemia with scar in mary septal and apical area  ct chest abdomen and pelvis noted, no pleural effusion or sign of chf  renal increased renal function last admission CR 4.8  continue asa daily /coreg will add ace as pt on HD  avoid excess fluid, may dc ivf, hx of chf , EF40%  s/p HD yesterday  dc colchicine  MRI noted may consider thoracic MRI  start on ARB while on HD, repeat echo noted with sig lv dysfunction and wall motion abnormality  increase fluid withrawal with increase LV pressure  increase valsartan as tolertaed  s/p stent yesterday , now with second stent today px LAD  increase valsartan for better bp control  HDcontinue DAPT for 6 months, then will switch asa daily  dc planning  increase valsartan dose to control bp now up to 320 mg, bp is much better controlled  repeat echo in 3 months if decrease ef will need AICD    	                    CARDIOLOGY     PROGRESS  NOTE   ________________________________________________    CHIEF COMPLAINT:Patient is a 62y old  Male who presents with a chief complaint of fevers (10 Nov 2022 18:31)  no complain  	  REVIEW OF SYSTEMS:  CONSTITUTIONAL: No fever, weight loss, or fatigue  EYES: No eye pain, visual disturbances, or discharge  ENT:  No difficulty hearing, tinnitus, vertigo; No sinus or throat pain  NECK: No pain or stiffness  RESPIRATORY: No cough, wheezing, chills or hemoptysis; No Shortness of Breath  CARDIOVASCULAR: No chest pain, palpitations, passing out, dizziness, or leg swelling  GASTROINTESTINAL: No abdominal or epigastric pain. No nausea, vomiting, or hematemesis; No diarrhea or constipation. No melena or hematochezia.  GENITOURINARY: No dysuria, frequency, hematuria, or incontinence  NEUROLOGICAL: No headaches, memory loss, loss of strength, numbness, or tremors  SKIN: No itching, burning, rashes, or lesions   LYMPH Nodes: No enlarged glands  ENDOCRINE: No heat or cold intolerance; No hair loss  MUSCULOSKELETAL: No joint pain or swelling; No muscle, back, or extremity pain  PSYCHIATRIC: No depression, anxiety, mood swings, or difficulty sleeping  HEME/LYMPH: No easy bruising, or bleeding gums  ALLERGY AND IMMUNOLOGIC: No hives or eczema	    [ x] All others negative	  [ ] Unable to obtain    PHYSICAL EXAM:  T(C): 36.7 (11-11-22 @ 04:13), Max: 36.8 (11-10-22 @ 14:39)  HR: 72 (11-11-22 @ 04:13) (71 - 81)  BP: 120/76 (11-11-22 @ 04:13) (119/59 - 151/62)  RR: 18 (11-11-22 @ 04:13) (17 - 18)  SpO2: 98% (11-11-22 @ 04:13) (95% - 98%)  Wt(kg): --  I&O's Summary    10 Nov 2022 07:01  -  11 Nov 2022 07:00  --------------------------------------------------------  IN: 120 mL / OUT: 1000 mL / NET: -880 mL        Appearance: Normal	  HEENT:   Normal oral mucosa, PERRL, EOMI	  Lymphatic: No lymphadenopathy  Cardiovascular: Normal S1 S2, No JVD, + murmurs, No edema  Respiratory:rhonchi  Psychiatry: A & O x 3, Mood & affect appropriate  Gastrointestinal:  Soft, Non-tender, + BS	  Skin: No rashes, No ecchymoses, No cyanosis	  Neurologic: Non-focal  Extremities: Normal range of motion, No clubbing, cyanosis or edema  Vascular: Peripheral pulses palpable 2+ bilaterally    MEDICATIONS  (STANDING):  allopurinol 100 milliGRAM(s) Oral daily  amLODIPine   Tablet 5 milliGRAM(s) Oral daily  aspirin enteric coated 81 milliGRAM(s) Oral daily  atorvastatin 80 milliGRAM(s) Oral at bedtime  carvedilol 25 milliGRAM(s) Oral every 12 hours  chlorhexidine 2% Cloths 1 Application(s) Topical daily  clopidogrel Tablet 75 milliGRAM(s) Oral daily  colchicine 0.3 milliGRAM(s) Oral every 48 hours  dextrose 5%. 1000 milliLiter(s) (50 mL/Hr) IV Continuous <Continuous>  dextrose 5%. 1000 milliLiter(s) (100 mL/Hr) IV Continuous <Continuous>  dextrose 50% Injectable 25 Gram(s) IV Push once  dextrose 50% Injectable 12.5 Gram(s) IV Push once  dextrose 50% Injectable 25 Gram(s) IV Push once  dextrose Oral Gel 15 Gram(s) Oral once  gabapentin 300 milliGRAM(s) Oral two times a day  glucagon  Injectable 1 milliGRAM(s) IntraMuscular once  heparin   Injectable 5000 Unit(s) SubCutaneous every 12 hours  insulin glargine Injectable (LANTUS) 10 Unit(s) SubCutaneous at bedtime  insulin lispro (ADMELOG) corrective regimen sliding scale   SubCutaneous three times a day before meals  insulin lispro (ADMELOG) corrective regimen sliding scale   SubCutaneous at bedtime  insulin lispro Injectable (ADMELOG) 5 Unit(s) SubCutaneous three times a day before meals  lidocaine/prilocaine Cream 1 Application(s) Topical <User Schedule>  predniSONE   Tablet 20 milliGRAM(s) Oral daily  remdesivir  IVPB   IV Intermittent   remdesivir  IVPB 100 milliGRAM(s) IV Intermittent every 24 hours  tamsulosin 0.4 milliGRAM(s) Oral at bedtime  valsartan 320 milliGRAM(s) Oral daily      TELEMETRY: 	    ECG:  	  RADIOLOGY:  OTHER: 	  	  LABS:	 	    CARDIAC MARKERS:            11-10    x   |  x   |  x   ----------------------------<  x   x    |  x   |  2.10<H>      TPro  6.2  /  Alb  2.8<L>  /  TBili  0.2  /  DBili  <0.1  /  AST  12  /  ALT  12  /  AlkPhos  124<H>  11-10    proBNP:   Lipid Profile:   HgA1c:   TSH:   PT/INR - ( 10 Nov 2022 20:09 )   PT: 12.8 sec;   INR: 1.10 ratio       Respiratory Viral Panel with COVID-19 by REBECCA (11.10.22 @ 07:30)    Rapid RVP Result: Detected    SARS-CoV-2: Detected: This Respiratory Panel uses polymerase chain reaction (PCR) to detect for  adenovirus; coronavirus (HKU1, NL63, 229E, OC43); human metapneumovirus  (hMPV); human enterovirus/rhinovirus (Entero/RV); influenza A; influenza  A/H1; influenza A/H3; influenza A/H1-2009; influenza B; parainfluenza  viruses 1, 2, 3, 4; respiratory syncytial virus; Mycoplasma pneumoniae;  Chlamydophila pneumoniae; and SARS-CoV-2.            Assessment and plan  ---------------------------  62 yom hx of CKD, DKA, DMtype2, cardiomyopathy w/ stents, HTN, gout p/w days of generalized weakness and AMS. Per wife, pt was just in [facility] for DKA. Was d/c'd w/ fever and still weak. Since tuesday, pt becoming more lethargic and altered especially when he waking up in morning. BG have been in the 200s since per cont BG monitor. Subjective fevers at home, wife feels that his abd is larger than it was before d/c from [facility]. Question of if pt has heather compliant w/ short and long acting insulin as wife works nights and is not there to give all meds at home. Unclear if pt has been falling at home but w/ bruising on his abd.   pt with hx of ashd, chf, cardiomyopathy, dm, s/p CARDIOMEM fu at [facility] with change of mental stratus and sob.  ct scan noted , no active chf  will adjust cardiac meds  will call chf team at [facility] to get Cardiomem readings  beta blocker/ hydralazine and Nitrate  ckd, renal follow up  check pro bnp  dvt prophylaxis  pt with known hx of cad, last stress test  in 07/2021, no ischemia with scar in mary septal and apical area  ct chest abdomen and pelvis noted, no pleural effusion or sign of chf  renal increased renal function last admission CR 4.8  continue asa daily /coreg will add ace as pt on HD  avoid excess fluid, may dc ivf, hx of chf , EF40%  s/p HD yesterday  dc colchicine  MRI noted may consider thoracic MRI  start on ARB while on HD, repeat echo noted with sig lv dysfunction and wall motion abnormality  increase fluid withrawal with increase LV pressure  increase valsartan as tolertaed  s/p stent yesterday , now with second stent today px LAD  increase valsartan for better bp control  HDcontinue DAPT for 6 months, then will switch asa daily  dc planning  increase valsartan dose to control bp now up to 320 mg, bp is much better controlled  repeat echo in 3 months if decrease ef will need AICD  covid +, ID noted, remdesvir ?? on HD

## 2022-11-11 NOTE — PROGRESS NOTE ADULT - ASSESSMENT
Assessment  DMT2: 62y Male with DM T2 with hyperglycemia admitted with weakness, was on insulin at home, now on basal bolus insulin, increased dose yesterday, blood sugars still running high and not at target in the setting of steroid management, no hypoglycemias. DC cancelled 2/2 covid+, appears comfortable in NAD, remains on prednisone.  CAD: on medications, monitored, asymptomatic.  HTN: On antihypertensive medications, monitored, asymptomatic.  CKD:  labs, chart reviewed.          Rubén Escoto MD  Cell:  [phone]  Office: [phone]    Rachel SHORT  Cell:   Office: [phone]               Assessment  DMT2: 62y Male with DM T2 with hyperglycemia admitted with weakness, was on insulin at home, now on basal bolus insulin, increased dose yesterday, blood sugars still running high and not at target in  the setting of steroid management, no hypoglycemias. DC cancelled 2/2 covid+, appears comfortable in NAD, remains on prednisone.  CAD: on medications, monitored, asymptomatic.  HTN: On antihypertensive medications, monitored, asymptomatic.  CKD:  labs, chart reviewed.          Rubén Escoto MD  Cell:  [phone]  Office: [phone]    Rachel SHORT  Cell:   Office: [phone]

## 2022-11-12 LAB
ALBUMIN SERPL ELPH-MCNC: 2.6 G/DL — LOW (ref 3.3–5)
ALP SERPL-CCNC: 98 U/L — SIGNIFICANT CHANGE UP (ref 40–120)
ALT FLD-CCNC: 13 U/L — SIGNIFICANT CHANGE UP (ref 10–45)
ANION GAP SERPL CALC-SCNC: 14 MMOL/L — SIGNIFICANT CHANGE UP (ref 5–17)
AST SERPL-CCNC: 12 U/L — SIGNIFICANT CHANGE UP (ref 10–40)
BILIRUB DIRECT SERPL-MCNC: <0.1 MG/DL — SIGNIFICANT CHANGE UP (ref 0–0.3)
BILIRUB INDIRECT FLD-MCNC: >0.1 MG/DL — LOW (ref 0.2–1)
BILIRUB SERPL-MCNC: 0.2 MG/DL — SIGNIFICANT CHANGE UP (ref 0.2–1.2)
BUN SERPL-MCNC: 64 MG/DL — HIGH (ref 7–23)
CALCIUM SERPL-MCNC: 8.3 MG/DL — LOW (ref 8.4–10.5)
CHLORIDE SERPL-SCNC: 99 MMOL/L — SIGNIFICANT CHANGE UP (ref 96–108)
CO2 SERPL-SCNC: 25 MMOL/L — SIGNIFICANT CHANGE UP (ref 22–31)
CREAT SERPL-MCNC: 4.23 MG/DL — HIGH (ref 0.5–1.3)
CREAT SERPL-MCNC: 4.23 MG/DL — HIGH (ref 0.5–1.3)
CRP SERPL-MCNC: 61 MG/L — HIGH (ref 0–4)
D DIMER BLD IA.RAPID-MCNC: 326 NG/ML DDU — HIGH
EGFR: 15 ML/MIN/1.73M2 — LOW
EGFR: 15 ML/MIN/1.73M2 — LOW
FERRITIN SERPL-MCNC: 463 NG/ML — HIGH (ref 30–400)
GLUCOSE BLDC GLUCOMTR-MCNC: 168 MG/DL — HIGH (ref 70–99)
GLUCOSE BLDC GLUCOMTR-MCNC: 279 MG/DL — HIGH (ref 70–99)
GLUCOSE BLDC GLUCOMTR-MCNC: 297 MG/DL — HIGH (ref 70–99)
GLUCOSE BLDC GLUCOMTR-MCNC: 378 MG/DL — HIGH (ref 70–99)
GLUCOSE SERPL-MCNC: 223 MG/DL — HIGH (ref 70–99)
HCT VFR BLD CALC: 29.3 % — LOW (ref 39–50)
HGB BLD-MCNC: 9.2 G/DL — LOW (ref 13–17)
INR BLD: 1.05 RATIO — SIGNIFICANT CHANGE UP (ref 0.88–1.16)
MCHC RBC-ENTMCNC: 28.2 PG — SIGNIFICANT CHANGE UP (ref 27–34)
MCHC RBC-ENTMCNC: 31.4 GM/DL — LOW (ref 32–36)
MCV RBC AUTO: 89.9 FL — SIGNIFICANT CHANGE UP (ref 80–100)
MRSA PCR RESULT.: SIGNIFICANT CHANGE UP
NRBC # BLD: 0 /100 WBCS — SIGNIFICANT CHANGE UP (ref 0–0)
PLATELET # BLD AUTO: 251 K/UL — SIGNIFICANT CHANGE UP (ref 150–400)
POTASSIUM SERPL-MCNC: 4 MMOL/L — SIGNIFICANT CHANGE UP (ref 3.5–5.3)
POTASSIUM SERPL-SCNC: 4 MMOL/L — SIGNIFICANT CHANGE UP (ref 3.5–5.3)
PROT SERPL-MCNC: 5.9 G/DL — LOW (ref 6–8.3)
PROTHROM AB SERPL-ACNC: 12.1 SEC — SIGNIFICANT CHANGE UP (ref 10.5–13.4)
RBC # BLD: 3.26 M/UL — LOW (ref 4.2–5.8)
RBC # FLD: 14.5 % — SIGNIFICANT CHANGE UP (ref 10.3–14.5)
S AUREUS DNA NOSE QL NAA+PROBE: SIGNIFICANT CHANGE UP
SODIUM SERPL-SCNC: 138 MMOL/L — SIGNIFICANT CHANGE UP (ref 135–145)
WBC # BLD: 6.56 K/UL — SIGNIFICANT CHANGE UP (ref 3.8–10.5)
WBC # FLD AUTO: 6.56 K/UL — SIGNIFICANT CHANGE UP (ref 3.8–10.5)

## 2022-11-12 RX ORDER — INSULIN GLARGINE 100 [IU]/ML
15 INJECTION, SOLUTION SUBCUTANEOUS AT BEDTIME
Refills: 0 | Status: DISCONTINUED | OUTPATIENT
Start: 2022-11-12 | End: 2022-11-13

## 2022-11-12 RX ORDER — INSULIN LISPRO 100/ML
8 VIAL (ML) SUBCUTANEOUS
Refills: 0 | Status: DISCONTINUED | OUTPATIENT
Start: 2022-11-12 | End: 2022-11-13

## 2022-11-12 RX ADMIN — Medication 100 MILLIGRAM(S): at 11:42

## 2022-11-12 RX ADMIN — CLOPIDOGREL BISULFATE 75 MILLIGRAM(S): 75 TABLET, FILM COATED ORAL at 11:42

## 2022-11-12 RX ADMIN — GABAPENTIN 300 MILLIGRAM(S): 400 CAPSULE ORAL at 05:41

## 2022-11-12 RX ADMIN — CARVEDILOL PHOSPHATE 25 MILLIGRAM(S): 80 CAPSULE, EXTENDED RELEASE ORAL at 05:41

## 2022-11-12 RX ADMIN — VALSARTAN 320 MILLIGRAM(S): 80 TABLET ORAL at 05:41

## 2022-11-12 RX ADMIN — CARVEDILOL PHOSPHATE 25 MILLIGRAM(S): 80 CAPSULE, EXTENDED RELEASE ORAL at 17:16

## 2022-11-12 RX ADMIN — REMDESIVIR 500 MILLIGRAM(S): 5 INJECTION INTRAVENOUS at 17:17

## 2022-11-12 RX ADMIN — Medication 650 MILLIGRAM(S): at 09:08

## 2022-11-12 RX ADMIN — Medication 1: at 21:40

## 2022-11-12 RX ADMIN — AMLODIPINE BESYLATE 5 MILLIGRAM(S): 2.5 TABLET ORAL at 05:41

## 2022-11-12 RX ADMIN — TAMSULOSIN HYDROCHLORIDE 0.4 MILLIGRAM(S): 0.4 CAPSULE ORAL at 21:42

## 2022-11-12 RX ADMIN — Medication 100 MILLIGRAM(S): at 22:04

## 2022-11-12 RX ADMIN — HEPARIN SODIUM 5000 UNIT(S): 5000 INJECTION INTRAVENOUS; SUBCUTANEOUS at 05:42

## 2022-11-12 RX ADMIN — CHLORHEXIDINE GLUCONATE 1 APPLICATION(S): 213 SOLUTION TOPICAL at 11:43

## 2022-11-12 RX ADMIN — ERYTHROPOIETIN 10000 UNIT(S): 10000 INJECTION, SOLUTION INTRAVENOUS; SUBCUTANEOUS at 13:27

## 2022-11-12 RX ADMIN — Medication 650 MILLIGRAM(S): at 17:16

## 2022-11-12 RX ADMIN — Medication 8 UNIT(S): at 17:15

## 2022-11-12 RX ADMIN — LIDOCAINE AND PRILOCAINE CREAM 1 APPLICATION(S): 25; 25 CREAM TOPICAL at 11:43

## 2022-11-12 RX ADMIN — Medication 8 UNIT(S): at 11:45

## 2022-11-12 RX ADMIN — Medication 650 MILLIGRAM(S): at 08:38

## 2022-11-12 RX ADMIN — Medication 3: at 08:36

## 2022-11-12 RX ADMIN — ATORVASTATIN CALCIUM 80 MILLIGRAM(S): 80 TABLET, FILM COATED ORAL at 21:42

## 2022-11-12 RX ADMIN — Medication 1: at 17:15

## 2022-11-12 RX ADMIN — GABAPENTIN 300 MILLIGRAM(S): 400 CAPSULE ORAL at 17:16

## 2022-11-12 RX ADMIN — Medication 20 MILLIGRAM(S): at 05:41

## 2022-11-12 RX ADMIN — HEPARIN SODIUM 5000 UNIT(S): 5000 INJECTION INTRAVENOUS; SUBCUTANEOUS at 17:17

## 2022-11-12 RX ADMIN — Medication 81 MILLIGRAM(S): at 11:42

## 2022-11-12 RX ADMIN — INSULIN GLARGINE 15 UNIT(S): 100 INJECTION, SOLUTION SUBCUTANEOUS at 21:40

## 2022-11-12 RX ADMIN — Medication 5: at 11:44

## 2022-11-12 RX ADMIN — Medication 650 MILLIGRAM(S): at 17:46

## 2022-11-12 NOTE — PROGRESS NOTE ADULT - ASSESSMENT
Due for HD today  on room air  afebrile  subsequent HD Tuesday    acetaminophen     Tablet .. 650 milliGRAM(s) Oral every 6 hours PRN  allopurinol 100 milliGRAM(s) Oral daily  amLODIPine   Tablet 5 milliGRAM(s) Oral daily  aspirin enteric coated 81 milliGRAM(s) Oral daily  atorvastatin 80 milliGRAM(s) Oral at bedtime  carvedilol 25 milliGRAM(s) Oral every 12 hours  chlorhexidine 2% Cloths 1 Application(s) Topical daily  clopidogrel Tablet 75 milliGRAM(s) Oral daily  colchicine 0.3 milliGRAM(s) Oral every 48 hours  dextrose 5%. 1000 milliLiter(s) IV Continuous <Continuous>  dextrose 5%. 1000 milliLiter(s) IV Continuous <Continuous>  dextrose 50% Injectable 25 Gram(s) IV Push once  dextrose 50% Injectable 12.5 Gram(s) IV Push once  dextrose 50% Injectable 25 Gram(s) IV Push once  dextrose Oral Gel 15 Gram(s) Oral once  gabapentin 300 milliGRAM(s) Oral two times a day  glucagon  Injectable 1 milliGRAM(s) IntraMuscular once  heparin   Injectable 5000 Unit(s) SubCutaneous every 12 hours  insulin glargine Injectable (LANTUS) 15 Unit(s) SubCutaneous at bedtime  insulin lispro (ADMELOG) corrective regimen sliding scale   SubCutaneous at bedtime  insulin lispro (ADMELOG) corrective regimen sliding scale   SubCutaneous three times a day before meals  insulin lispro Injectable (ADMELOG) 8 Unit(s) SubCutaneous three times a day before meals  lidocaine/prilocaine Cream 1 Application(s) Topical <User Schedule>  predniSONE   Tablet 20 milliGRAM(s) Oral daily  remdesivir  IVPB   IV Intermittent   remdesivir  IVPB 100 milliGRAM(s) IV Intermittent every 24 hours  tamsulosin 0.4 milliGRAM(s) Oral at bedtime  valsartan 320 milliGRAM(s) Oral daily      VITAL:  T(C): , Max: 36.7 (11-12-22 @ 04:20)  T(F): , Max: 98.1 (11-12-22 @ 04:20)  HR: 73 (11-12-22 @ 11:52)  BP: 110/67 (11-12-22 @ 11:52)  BP(mean): --  RR: 18 (11-12-22 @ 11:52)  SpO2: 96% (11-12-22 @ 11:52)  Wt(kg): --    11-11-22 @ 07:01  -  11-12-22 @ 07:00  --------------------------------------------------------  IN: 480 mL / OUT: 400 mL / NET: 80 mL    11-12-22 @ 07:01  -  11-12-22 @ 14:07  --------------------------------------------------------  IN: 200 mL / OUT: 400 mL / NET: -200 mL        PHYSICAL EXAM:    Constitutional: NAD  Neck:  No JVD  Respiratory: unlabored breathing  Cardiovascular: S1 and S2  Gastrointestinal: BS+, soft, NT/ND  Extremities: No peripheral edema  Neurological: A/O x 3, no focal deficits  Psychiatric: Normal mood, normal affect  : No Jay  Skin: No rashes  Access: avf    LABS:                          9.2    6.56  )-----------( 251      ( 12 Nov 2022 07:22 )             29.3     Na(138)/K(4.0)/Cl(99)/HCO3(25)/BUN(64)/Cr(4.23)Glu(223)/Ca(8.3)/Mg(--)/PO4(--)    11-12 @ 07:32  Na(--)/K(--)/Cl(--)/HCO3(--)/BUN(--)/Cr(2.10)Glu(--)/Ca(--)/Mg(--)/PO4(--)    11-10 @ 20:09            ASSESSMENT/PLAN    (1)ESRD- due for HD today, then subsequent HD Tuesday  (2)Lytes- controlled  (3)CV-BP acceptable  (4)COVID  +   (5)Triple vessel disease sp stent     RECOMMEND:  (1)HD on today  (2)SP Cardiac stent and DAPT for 6 months    (3)c/w  Valsartan 320mg po qd;  and    Norvasc as ordered  (4)Dose new meds for GFR <10  (5) ID eval noted- Remdesivir and Pred as ordered    Valentino Meza NP-BC  [facility]  [phone]

## 2022-11-12 NOTE — PROGRESS NOTE ADULT - SUBJECTIVE AND OBJECTIVE BOX
CC: f/u for covid  Patient reports  has a cough  REVIEW OF SYSTEMS:  All other review of systems negative (Comprehensive ROS)    Antimicrobials Day #  :    Other Medications Reviewed    T(F): 97.5 (11-12-22 @ 16:04), Max: 98.1 (11-12-22 @ 04:20)  HR: 67 (11-12-22 @ 16:04)  BP: 133/60 (11-12-22 @ 16:04)  RR: 18 (11-12-22 @ 16:04)  SpO2: 96% (11-12-22 @ 16:04)  Wt(kg): --    PHYSICAL EXAM:  General: alert, no acute distress  Eyes:  anicteric, no conjunctival injection, no discharge  Oropharynx: no lesions or injection 	  Neck: supple, without adenopathy  Lungs: clear to auscultation  Heart: regular rate and rhythm; no murmur, rubs or gallops  Abdomen: soft, nondistended, nontender, without mass or organomegaly  Skin: no lesions  Extremities: no clubbing, cyanosis, or edema  Neurologic: alert, oriented, moves all extremities    LAB RESULTS:                        9.2    6.56  )-----------( 251      ( 12 Nov 2022 07:22 )             29.3     11-12    138  |  99  |  64<H>  ----------------------------<  223<H>  4.0   |  25  |  4.23<H>    Ca    8.3<L>      12 Nov 2022 07:32    TPro  5.9<L>  /  Alb  2.6<L>  /  TBili  0.2  /  DBili  <0.1  /  AST  12  /  ALT  13  /  AlkPhos  98  11-12    LIVER FUNCTIONS - ( 12 Nov 2022 07:32 )  Alb: 2.6 g/dL / Pro: 5.9 g/dL / ALK PHOS: 98 U/L / ALT: 13 U/L / AST: 12 U/L / GGT: x             MICROBIOLOGY:  RECENT CULTURES:      RADIOLOGY REVIEWED:              Assessment:  patient with cardiomyopathy, esrd now on dialysis, developed covid, mild symptoms not hypoxic, now finished 3 d of rdv  Plan:  monitor sats  dvt prophylaxis  robitussin

## 2022-11-12 NOTE — PROGRESS NOTE ADULT - SUBJECTIVE AND OBJECTIVE BOX
Chief complaint    Patient is a 62y old  Male who presents with a chief complaint of fevers (12 Nov 2022 14:07)   Review of systems  Patient in bed, appears comfortable.    Labs and Fingersticks  CAPILLARY BLOOD GLUCOSE      POCT Blood Glucose.: 168 mg/dL (12 Nov 2022 16:27)  POCT Blood Glucose.: 378 mg/dL (12 Nov 2022 11:29)  POCT Blood Glucose.: 279 mg/dL (12 Nov 2022 07:48)  POCT Blood Glucose.: 269 mg/dL (11 Nov 2022 21:19)      Anion Gap, Serum: 14 (11-12 @ 07:32)      Calcium, Total Serum: 8.3 *L* (11-12 @ 07:32)  Albumin, Serum: 2.6 *L* (11-12 @ 07:32)  Albumin, Serum: 2.8 *L* (11-10 @ 20:09)    Alanine Aminotransferase (ALT/SGPT): 13 (11-12 @ 07:32)  Alanine Aminotransferase (ALT/SGPT): 12 (11-10 @ 20:09)  Alkaline Phosphatase, Serum: 98 (11-12 @ 07:32)  Alkaline Phosphatase, Serum: 124 *H* (11-10 @ 20:09)  Aspartate Aminotransferase (AST/SGOT): 12 (11-12 @ 07:32)  Aspartate Aminotransferase (AST/SGOT): 12 (11-10 @ 20:09)        11-12    138  |  99  |  64<H>  ----------------------------<  223<H>  4.0   |  25  |  4.23<H>    Ca    8.3<L>      12 Nov 2022 07:32    TPro  5.9<L>  /  Alb  2.6<L>  /  TBili  0.2  /  DBili  <0.1  /  AST  12  /  ALT  13  /  AlkPhos  98  11-12                        9.2    6.56  )-----------( 251      ( 12 Nov 2022 07:22 )             29.3     Medications  MEDICATIONS  (STANDING):  allopurinol 100 milliGRAM(s) Oral daily  amLODIPine   Tablet 5 milliGRAM(s) Oral daily  aspirin enteric coated 81 milliGRAM(s) Oral daily  atorvastatin 80 milliGRAM(s) Oral at bedtime  benzonatate 100 milliGRAM(s) Oral three times a day  carvedilol 25 milliGRAM(s) Oral every 12 hours  chlorhexidine 2% Cloths 1 Application(s) Topical daily  clopidogrel Tablet 75 milliGRAM(s) Oral daily  colchicine 0.3 milliGRAM(s) Oral every 48 hours  dextrose 5%. 1000 milliLiter(s) (100 mL/Hr) IV Continuous <Continuous>  dextrose 5%. 1000 milliLiter(s) (50 mL/Hr) IV Continuous <Continuous>  dextrose 50% Injectable 25 Gram(s) IV Push once  dextrose 50% Injectable 12.5 Gram(s) IV Push once  dextrose 50% Injectable 25 Gram(s) IV Push once  dextrose Oral Gel 15 Gram(s) Oral once  gabapentin 300 milliGRAM(s) Oral two times a day  glucagon  Injectable 1 milliGRAM(s) IntraMuscular once  heparin   Injectable 5000 Unit(s) SubCutaneous every 12 hours  insulin glargine Injectable (LANTUS) 15 Unit(s) SubCutaneous at bedtime  insulin lispro (ADMELOG) corrective regimen sliding scale   SubCutaneous at bedtime  insulin lispro (ADMELOG) corrective regimen sliding scale   SubCutaneous three times a day before meals  insulin lispro Injectable (ADMELOG) 8 Unit(s) SubCutaneous three times a day before meals  lidocaine/prilocaine Cream 1 Application(s) Topical <User Schedule>  predniSONE   Tablet 20 milliGRAM(s) Oral daily  remdesivir  IVPB   IV Intermittent   remdesivir  IVPB 100 milliGRAM(s) IV Intermittent every 24 hours  tamsulosin 0.4 milliGRAM(s) Oral at bedtime  valsartan 320 milliGRAM(s) Oral daily      Physical Exam  General: Patient appears comfortable.  Vital Signs Last 12 Hrs  T(F): 97.5 (11-12-22 @ 16:04), Max: 98 (11-12-22 @ 11:52)  HR: 67 (11-12-22 @ 16:04) (67 - 73)  BP: 133/60 (11-12-22 @ 16:04) (110/67 - 133/60)  BP(mean): --  RR: 18 (11-12-22 @ 16:04) (18 - 18)  SpO2: 96% (11-12-22 @ 16:04) (96% - 97%)  Neck: No palpable thyroid nodules.  CVS: S1S2, No murmurs  Respiratory: No wheezing, no crepitations  GI: Abdomen soft, non tender.  Musculoskeletal:  edema lower extremities.     Diagnostics    A1C with Estimated Average Glucose: Routine (10-23 @ 07:37)  A1C with Estimated Average Glucose: Routine  Cancel Reason: Duplicate Order (10-23 @ 07:36)  A1C with Estimated Average Glucose: Routine  Cancel Reason: Duplicate Order (10-23 @ 07:35)

## 2022-11-12 NOTE — PROGRESS NOTE ADULT - ASSESSMENT
Assessment  DMT2: 62y Male with DM T2 with hyperglycemia admitted with weakness, was on insulin at home, now on basal bolus insulin, increased dose yesterday, blood sugars still running high  no hypoglycemias. DC cancelled 2/2 covid+, appears comfortable in NAD, remains on prednisone.  CAD: on medications, monitored, asymptomatic.  HTN: On antihypertensive medications, monitored, asymptomatic.  CKD:  labs, chart reviewed.          Rubén Escoto MD  Cell:  [phone]  Office: [phone]    Rachel SHORT  Cell:   Office: [phone]

## 2022-11-12 NOTE — PROGRESS NOTE ADULT - ASSESSMENT
2 yom   hx of CKD,   s/p DKA, DM.     cardiomyopathy,  CAD w/ stents, HTN, gout     generalized  weakness.  with   c/c h/o b/l leg weakness/ and   b/l arm weakness, r > l      p/w days of generalized weakness and AMS    per wife, pt was just in [facility] for DKA.  and  was d/c'd w/ fever and still weak.     more lethargic and altered /  resolved           *   pt  admitted  with fevers/ ams. / metabolic  encephalopathy   on  arrival, wbc  was12,000       bcx/ ucx,  was negative      no source of infection found        was on   Zosyn  ,  Ct  a/p,   ? cystitis/   ID eval dr harrington    * CKD,  crt is 5,4/ ,  renal  dr dickerson     *   c/c  anemia     *   DM,  follow  fs        on lantus/  known to  [facility]     *  ef 40     *   c/c  weakness  of   limbs/  neuro ga mccain       has  functional  quadriplegia/  CT  head, . old  arachnoid  cyst/  no intervention      *    acute  gout, right  wrist, on  prednisone/ colchicine     on dvt  ppx     MRI  head/  C  spine.  old  infarcts    much improved, more  alert/  spoke  with wife/     wife  wants  rehab/,  unable  to take care of  pt     glucos e noted,  stopped  admelog/  follow fs/ pt  is  a  finnicky  eater     echo  from 10/28.  ef  30/   s/p cath, cad.  LAD/ RCA/ pt   had refuse d intervention     staged  PCI on 11/2  and on 11/3   to  RCA  and  LAD      was awaiting   d/c  to rehab,   now pt is covid  +. hence  not transferred     have to isolate/  wife awrae/ pt  doing  well.on remdisivir  per  ID    isoate  for  10  days, pe r current  protocol          rad< from: CT Abdomen and Pelvis No Cont (10.22.22 @ 15:40) >  IMPRESSION:  Mild bladder wall thickening. Correlate with urinalysis  --- End of Report ---  < end of copied text >      rad< from: Transthoracic Echocardiogram (12.07.21 @ 11:24) >  nclusions:  1. Normal mitral valve. Minimal mitral regurgitation.  2. Calcified trileaflet aortic valve with normal opening.  Minimal aortic regurgitation.  3. Mild segmental left ventricular systolic dysfunction.  The basal inferior, basal septum, distal septum, distal  inferior, and the apex are akinetic. Recommend limited  repeat imaging with intravenous echo contrast to better  visualize the apex. (Per sonographer, patient did not agree  to echo contrast during the study.)  4. Mild diastolic dysfunction (Stage I).  5. Normal right ventricular size and function.  *** No previous Echo exam.    < end of copied text >

## 2022-11-12 NOTE — PROGRESS NOTE ADULT - SUBJECTIVE AND OBJECTIVE BOX
CARDIOLOGY     PROGRESS  NOTE   ________________________________________________    CHIEF COMPLAINT:Patient is a 62y old  Male who presents with a chief complaint of fevers (11 Nov 2022 20:17)  no complain  	  REVIEW OF SYSTEMS:  CONSTITUTIONAL: No fever, weight loss, or fatigue  EYES: No eye pain, visual disturbances, or discharge  ENT:  No difficulty hearing, tinnitus, vertigo; No sinus or throat pain  NECK: No pain or stiffness  RESPIRATORY: No cough, wheezing, chills or hemoptysis; No Shortness of Breath  CARDIOVASCULAR: No chest pain, palpitations, passing out, dizziness, or leg swelling  GASTROINTESTINAL: No abdominal or epigastric pain. No nausea, vomiting, or hematemesis; No diarrhea or constipation. No melena or hematochezia.  GENITOURINARY: No dysuria, frequency, hematuria, or incontinence  NEUROLOGICAL: No headaches, memory loss, loss of strength, numbness, or tremors  SKIN: No itching, burning, rashes, or lesions   LYMPH Nodes: No enlarged glands  ENDOCRINE: No heat or cold intolerance; No hair loss  MUSCULOSKELETAL: No joint pain or swelling; No muscle, back, or extremity pain  PSYCHIATRIC: No depression, anxiety, mood swings, or difficulty sleeping  HEME/LYMPH: No easy bruising, or bleeding gums  ALLERGY AND IMMUNOLOGIC: No hives or eczema	    x[ ] All others negative	  [ ] Unable to obtain    PHYSICAL EXAM:  T(C): 36.7 (11-12-22 @ 04:20), Max: 36.7 (11-11-22 @ 10:44)  HR: 68 (11-12-22 @ 04:20) (68 - 80)  BP: 132/65 (11-12-22 @ 04:20) (113/61 - 132/68)  RR: 18 (11-12-22 @ 04:20) (18 - 18)  SpO2: 96% (11-12-22 @ 04:20) (95% - 96%)  Wt(kg): --  I&O's Summary    11 Nov 2022 07:01  -  12 Nov 2022 07:00  --------------------------------------------------------  IN: 480 mL / OUT: 400 mL / NET: 80 mL        Appearance: Normal	  HEENT:   Normal oral mucosa, PERRL, EOMI	  Lymphatic: No lymphadenopathy  Cardiovascular: Normal S1 S2, No JVD, + murmurs, No edema  Respiratory: rhonchi  Gastrointestinal:  Soft, Non-tender, + BS	  Skin: No rashes, No ecchymoses, No cyanosis	  Neurologic: Non-focal  Extremities: Normal range of motion, No clubbing, cyanosis or edema  Vascular: Peripheral pulses palpable 2+ bilaterally    MEDICATIONS  (STANDING):  allopurinol 100 milliGRAM(s) Oral daily  amLODIPine   Tablet 5 milliGRAM(s) Oral daily  aspirin enteric coated 81 milliGRAM(s) Oral daily  atorvastatin 80 milliGRAM(s) Oral at bedtime  carvedilol 25 milliGRAM(s) Oral every 12 hours  chlorhexidine 2% Cloths 1 Application(s) Topical daily  clopidogrel Tablet 75 milliGRAM(s) Oral daily  colchicine 0.3 milliGRAM(s) Oral every 48 hours  dextrose 5%. 1000 milliLiter(s) (100 mL/Hr) IV Continuous <Continuous>  dextrose 5%. 1000 milliLiter(s) (50 mL/Hr) IV Continuous <Continuous>  dextrose 50% Injectable 25 Gram(s) IV Push once  dextrose 50% Injectable 12.5 Gram(s) IV Push once  dextrose 50% Injectable 25 Gram(s) IV Push once  dextrose Oral Gel 15 Gram(s) Oral once  epoetin wayne-epbx (RETACRIT) Injectable 64174 Unit(s) IV Push once  gabapentin 300 milliGRAM(s) Oral two times a day  glucagon  Injectable 1 milliGRAM(s) IntraMuscular once  heparin   Injectable 5000 Unit(s) SubCutaneous every 12 hours  insulin glargine Injectable (LANTUS) 15 Unit(s) SubCutaneous at bedtime  insulin lispro (ADMELOG) corrective regimen sliding scale   SubCutaneous at bedtime  insulin lispro (ADMELOG) corrective regimen sliding scale   SubCutaneous three times a day before meals  insulin lispro Injectable (ADMELOG) 8 Unit(s) SubCutaneous three times a day before meals  lidocaine/prilocaine Cream 1 Application(s) Topical <User Schedule>  predniSONE   Tablet 20 milliGRAM(s) Oral daily  remdesivir  IVPB   IV Intermittent   remdesivir  IVPB 100 milliGRAM(s) IV Intermittent every 24 hours  tamsulosin 0.4 milliGRAM(s) Oral at bedtime  valsartan 320 milliGRAM(s) Oral daily      TELEMETRY: 	    ECG:  	  RADIOLOGY:  OTHER: 	  	  LABS:	 	    CARDIAC MARKERS:                                9.2    6.56  )-----------( 251      ( 12 Nov 2022 07:22 )             29.3     11-12    138  |  99  |  64<H>  ----------------------------<  223<H>  4.0   |  25  |  4.23<H>    Ca    8.3<L>      12 Nov 2022 07:32    TPro  5.9<L>  /  Alb  2.6<L>  /  TBili  0.2  /  DBili  <0.1  /  AST  12  /  ALT  13  /  AlkPhos  98  11-12    proBNP:   Lipid Profile:   HgA1c:   TSH:   PT/INR - ( 12 Nov 2022 07:32 )   PT: 12.1 sec;   INR: 1.05 ratio               Assessment and plan  ---------------------------  62 yom hx of CKD, DKA, DMtype2, cardiomyopathy w/ stents, HTN, gout p/w days of generalized weakness and AMS. Per wife, pt was just in [facility] for DKA. Was d/c'd w/ fever and still weak. Since tuesday, pt becoming more lethargic and altered especially when he waking up in morning. BG have been in the 200s since per cont BG monitor. Subjective fevers at home, wife feels that his abd is larger than it was before d/c from [facility]. Question of if pt has heather compliant w/ short and long acting insulin as wife works nights and is not there to give all meds at home. Unclear if pt has been falling at home but w/ bruising on his abd.   pt with hx of ashd, chf, cardiomyopathy, dm, s/p CARDIOMEM fu at [facility] with change of mental stratus and sob.  ct scan noted , no active chf  will adjust cardiac meds  will call chf team at [facility] to get Cardiomem readings  beta blocker/ hydralazine and Nitrate  ckd, renal follow up  check pro bnp  dvt prophylaxis  pt with known hx of cad, last stress test  in 07/2021, no ischemia with scar in mary septal and apical area  ct chest abdomen and pelvis noted, no pleural effusion or sign of chf  renal increased renal function last admission CR 4.8  continue asa daily /coreg will add ace as pt on HD  avoid excess fluid, may dc ivf, hx of chf , EF40%  s/p HD yesterday  dc colchicine  MRI noted may consider thoracic MRI  start on ARB while on HD, repeat echo noted with sig lv dysfunction and wall motion abnormality  increase fluid withrawal with increase LV pressure  increase valsartan as tolertaed  s/p stent yesterday , now with second stent today px LAD  increase valsartan for better bp control  HDcontinue DAPT for 6 months, then will switch asa daily  dc planning  increase valsartan dose to control bp now up to 320 mg, bp is much better controlled  repeat echo in 3 months if decrease ef will need AICD  covid +, ID noted, remdesvir ?? on HD  continue asa/plavix for 6 months

## 2022-11-13 LAB
ALBUMIN SERPL ELPH-MCNC: 2.4 G/DL — LOW (ref 3.3–5)
ALP SERPL-CCNC: 92 U/L — SIGNIFICANT CHANGE UP (ref 40–120)
ALT FLD-CCNC: 11 U/L — SIGNIFICANT CHANGE UP (ref 10–45)
ANION GAP SERPL CALC-SCNC: 12 MMOL/L — SIGNIFICANT CHANGE UP (ref 5–17)
AST SERPL-CCNC: 9 U/L — LOW (ref 10–40)
BILIRUB SERPL-MCNC: 0.2 MG/DL — SIGNIFICANT CHANGE UP (ref 0.2–1.2)
BUN SERPL-MCNC: 40 MG/DL — HIGH (ref 7–23)
CALCIUM SERPL-MCNC: 8.1 MG/DL — LOW (ref 8.4–10.5)
CHLORIDE SERPL-SCNC: 101 MMOL/L — SIGNIFICANT CHANGE UP (ref 96–108)
CO2 SERPL-SCNC: 27 MMOL/L — SIGNIFICANT CHANGE UP (ref 22–31)
CREAT SERPL-MCNC: 2.88 MG/DL — HIGH (ref 0.5–1.3)
EGFR: 24 ML/MIN/1.73M2 — LOW
GLUCOSE BLDC GLUCOMTR-MCNC: 121 MG/DL — HIGH (ref 70–99)
GLUCOSE BLDC GLUCOMTR-MCNC: 243 MG/DL — HIGH (ref 70–99)
GLUCOSE BLDC GLUCOMTR-MCNC: 268 MG/DL — HIGH (ref 70–99)
GLUCOSE BLDC GLUCOMTR-MCNC: 297 MG/DL — HIGH (ref 70–99)
GLUCOSE SERPL-MCNC: 127 MG/DL — HIGH (ref 70–99)
HCT VFR BLD CALC: 29.8 % — LOW (ref 39–50)
HGB BLD-MCNC: 9.4 G/DL — LOW (ref 13–17)
MCHC RBC-ENTMCNC: 27.8 PG — SIGNIFICANT CHANGE UP (ref 27–34)
MCHC RBC-ENTMCNC: 31.5 GM/DL — LOW (ref 32–36)
MCV RBC AUTO: 88.2 FL — SIGNIFICANT CHANGE UP (ref 80–100)
NRBC # BLD: 0 /100 WBCS — SIGNIFICANT CHANGE UP (ref 0–0)
PHOSPHATE SERPL-MCNC: 3.3 MG/DL — SIGNIFICANT CHANGE UP (ref 2.5–4.5)
PLATELET # BLD AUTO: 281 K/UL — SIGNIFICANT CHANGE UP (ref 150–400)
POTASSIUM SERPL-MCNC: 3.9 MMOL/L — SIGNIFICANT CHANGE UP (ref 3.5–5.3)
POTASSIUM SERPL-SCNC: 3.9 MMOL/L — SIGNIFICANT CHANGE UP (ref 3.5–5.3)
PROT SERPL-MCNC: 5.5 G/DL — LOW (ref 6–8.3)
RBC # BLD: 3.38 M/UL — LOW (ref 4.2–5.8)
RBC # FLD: 14.6 % — HIGH (ref 10.3–14.5)
SODIUM SERPL-SCNC: 140 MMOL/L — SIGNIFICANT CHANGE UP (ref 135–145)
WBC # BLD: 7.47 K/UL — SIGNIFICANT CHANGE UP (ref 3.8–10.5)
WBC # FLD AUTO: 7.47 K/UL — SIGNIFICANT CHANGE UP (ref 3.8–10.5)

## 2022-11-13 RX ORDER — INSULIN GLARGINE 100 [IU]/ML
12 INJECTION, SOLUTION SUBCUTANEOUS AT BEDTIME
Refills: 0 | Status: DISCONTINUED | OUTPATIENT
Start: 2022-11-13 | End: 2022-11-14

## 2022-11-13 RX ORDER — INSULIN LISPRO 100/ML
6 VIAL (ML) SUBCUTANEOUS
Refills: 0 | Status: DISCONTINUED | OUTPATIENT
Start: 2022-11-13 | End: 2022-11-14

## 2022-11-13 RX ADMIN — INSULIN GLARGINE 12 UNIT(S): 100 INJECTION, SOLUTION SUBCUTANEOUS at 21:38

## 2022-11-13 RX ADMIN — CARVEDILOL PHOSPHATE 25 MILLIGRAM(S): 80 CAPSULE, EXTENDED RELEASE ORAL at 06:27

## 2022-11-13 RX ADMIN — HEPARIN SODIUM 5000 UNIT(S): 5000 INJECTION INTRAVENOUS; SUBCUTANEOUS at 06:27

## 2022-11-13 RX ADMIN — Medication 6 UNIT(S): at 16:52

## 2022-11-13 RX ADMIN — Medication 8 UNIT(S): at 09:16

## 2022-11-13 RX ADMIN — CHLORHEXIDINE GLUCONATE 1 APPLICATION(S): 213 SOLUTION TOPICAL at 09:15

## 2022-11-13 RX ADMIN — HEPARIN SODIUM 5000 UNIT(S): 5000 INJECTION INTRAVENOUS; SUBCUTANEOUS at 16:51

## 2022-11-13 RX ADMIN — Medication 100 MILLIGRAM(S): at 06:29

## 2022-11-13 RX ADMIN — Medication 1: at 21:39

## 2022-11-13 RX ADMIN — Medication 20 MILLIGRAM(S): at 06:28

## 2022-11-13 RX ADMIN — Medication 100 MILLIGRAM(S): at 09:12

## 2022-11-13 RX ADMIN — ATORVASTATIN CALCIUM 80 MILLIGRAM(S): 80 TABLET, FILM COATED ORAL at 21:38

## 2022-11-13 RX ADMIN — Medication 100 MILLIGRAM(S): at 12:26

## 2022-11-13 RX ADMIN — VALSARTAN 320 MILLIGRAM(S): 80 TABLET ORAL at 06:28

## 2022-11-13 RX ADMIN — TAMSULOSIN HYDROCHLORIDE 0.4 MILLIGRAM(S): 0.4 CAPSULE ORAL at 21:38

## 2022-11-13 RX ADMIN — Medication 2: at 12:26

## 2022-11-13 RX ADMIN — AMLODIPINE BESYLATE 5 MILLIGRAM(S): 2.5 TABLET ORAL at 06:28

## 2022-11-13 RX ADMIN — Medication 100 MILLIGRAM(S): at 21:38

## 2022-11-13 RX ADMIN — CLOPIDOGREL BISULFATE 75 MILLIGRAM(S): 75 TABLET, FILM COATED ORAL at 09:11

## 2022-11-13 RX ADMIN — CARVEDILOL PHOSPHATE 25 MILLIGRAM(S): 80 CAPSULE, EXTENDED RELEASE ORAL at 16:52

## 2022-11-13 RX ADMIN — Medication 81 MILLIGRAM(S): at 09:11

## 2022-11-13 RX ADMIN — Medication 0.3 MILLIGRAM(S): at 12:25

## 2022-11-13 RX ADMIN — Medication 3: at 16:52

## 2022-11-13 RX ADMIN — GABAPENTIN 300 MILLIGRAM(S): 400 CAPSULE ORAL at 16:53

## 2022-11-13 RX ADMIN — GABAPENTIN 300 MILLIGRAM(S): 400 CAPSULE ORAL at 06:27

## 2022-11-13 RX ADMIN — Medication 6 UNIT(S): at 12:26

## 2022-11-13 NOTE — PROGRESS NOTE ADULT - SUBJECTIVE AND OBJECTIVE BOX
afberile  REVIEW OF SYSTEMS:  GEN: no fever,    no chills  RESP: no SOB,   no cough  CVS: no chest pain,   no palpitations  GI: no abdominal pain,   no nausea,   no vomiting,   no constipation,   no diarrhea  : no dysuria,   no frequency  NEURO: no headache,   no dizziness  PSYCH: no depression,   not anxious  Derm : no rash    MEDICATIONS  (STANDING):  allopurinol 100 milliGRAM(s) Oral daily  amLODIPine   Tablet 5 milliGRAM(s) Oral daily  aspirin enteric coated 81 milliGRAM(s) Oral daily  atorvastatin 80 milliGRAM(s) Oral at bedtime  benzonatate 100 milliGRAM(s) Oral three times a day  carvedilol 25 milliGRAM(s) Oral every 12 hours  chlorhexidine 2% Cloths 1 Application(s) Topical daily  clopidogrel Tablet 75 milliGRAM(s) Oral daily  colchicine 0.3 milliGRAM(s) Oral every 48 hours  dextrose 5%. 1000 milliLiter(s) (50 mL/Hr) IV Continuous <Continuous>  dextrose 5%. 1000 milliLiter(s) (100 mL/Hr) IV Continuous <Continuous>  dextrose 50% Injectable 25 Gram(s) IV Push once  dextrose 50% Injectable 25 Gram(s) IV Push once  dextrose 50% Injectable 12.5 Gram(s) IV Push once  dextrose Oral Gel 15 Gram(s) Oral once  gabapentin 300 milliGRAM(s) Oral two times a day  glucagon  Injectable 1 milliGRAM(s) IntraMuscular once  heparin   Injectable 5000 Unit(s) SubCutaneous every 12 hours  insulin glargine Injectable (LANTUS) 12 Unit(s) SubCutaneous at bedtime  insulin lispro (ADMELOG) corrective regimen sliding scale   SubCutaneous three times a day before meals  insulin lispro (ADMELOG) corrective regimen sliding scale   SubCutaneous at bedtime  insulin lispro Injectable (ADMELOG) 6 Unit(s) SubCutaneous three times a day before meals  lidocaine/prilocaine Cream 1 Application(s) Topical <User Schedule>  tamsulosin 0.4 milliGRAM(s) Oral at bedtime  valsartan 320 milliGRAM(s) Oral daily    MEDICATIONS  (PRN):  acetaminophen     Tablet .. 650 milliGRAM(s) Oral every 6 hours PRN Mild Pain (1 - 3), Moderate Pain (4 - 6)  guaiFENesin Oral Liquid (Sugar-Free) 100 milliGRAM(s) Oral every 6 hours PRN Cough      Vital Signs Last 24 Hrs  T(C): 36.3 (13 Nov 2022 10:58), Max: 36.7 (12 Nov 2022 11:52)  T(F): 97.4 (13 Nov 2022 10:58), Max: 98 (12 Nov 2022 11:52)  HR: 63 (13 Nov 2022 10:58) (63 - 78)  BP: 102/62 (13 Nov 2022 10:58) (102/62 - 142/78)  BP(mean): --  RR: 18 (13 Nov 2022 10:58) (18 - 18)  SpO2: 98% (13 Nov 2022 10:58) (96% - 98%)    Parameters below as of 13 Nov 2022 10:58  Patient On (Oxygen Delivery Method): room air      CAPILLARY BLOOD GLUCOSE      POCT Blood Glucose.: 243 mg/dL (13 Nov 2022 11:45)  POCT Blood Glucose.: 121 mg/dL (13 Nov 2022 07:41)  POCT Blood Glucose.: 297 mg/dL (12 Nov 2022 21:20)  POCT Blood Glucose.: 168 mg/dL (12 Nov 2022 16:27)    I&O's Summary    12 Nov 2022 07:01  -  13 Nov 2022 07:00  --------------------------------------------------------  IN: 1000 mL / OUT: 2050 mL / NET: -1050 mL        PHYSICAL EXAM:  HEAD:  Atraumatic, Normocephalic  NECK: Supple, No   JVD  CHEST/LUNG:   no     rales,     no,    rhonchi  HEART: Regular rate and rhythm;         murmur  ABDOMEN: Soft, Nontender, ;   EXTREMITIES:   no     edema  NEUROLOGY:  alert    LABS:                        9.4    7.47  )-----------( 281      ( 13 Nov 2022 07:11 )             29.8     11-13    140  |  101  |  40<H>  ----------------------------<  127<H>  3.9   |  27  |  2.88<H>    Ca    8.1<L>      13 Nov 2022 07:11  Phos  3.3     11-13    TPro  5.5<L>  /  Alb  2.4<L>  /  TBili  0.2  /  DBili  x   /  AST  9<L>  /  ALT  11  /  AlkPhos  92  11-13    PT/INR - ( 12 Nov 2022 07:32 )   PT: 12.1 sec;   INR: 1.05 ratio                                 Consultant(s) Notes Reviewed:      Care Discussed with Consultants/Other Providers:

## 2022-11-13 NOTE — PROGRESS NOTE ADULT - ASSESSMENT
Assessment  DMT2: 62y Male with DM T2 with hyperglycemia admitted with weakness, was on insulin at home, now on basal bolus insulin, blood sugars in acceptable range this AM and fluctuating postprandially, no hypoglycemias, eating, prednisone DC.  CAD: on medications, monitored, asymptomatic.  HTN: On antihypertensive medications, monitored, asymptomatic.  CKD:  labs, chart reviewed.          Rubén Escoto MD  Cell:  [phone]  Office: [phone]    Rachel SHORT  Cell:   Office: [phone]               Assessment  DMT2: 62y Male with DM T2 with hyperglycemia admitted with weakness, was on insulin at home, now on basal bolus insulin, blood sugars in acceptable range this AM and fluctuating postprandially,  no hypoglycemias, eating, prednisone DC.  CAD: on medications, monitored, asymptomatic.  HTN: On antihypertensive medications, monitored, asymptomatic.  CKD:  labs, chart reviewed.          Rubén Escoto MD  Cell:  [phone]  Office: [phone]    Rachel SHORT  Cell:   Office: [phone]

## 2022-11-13 NOTE — PROGRESS NOTE ADULT - ASSESSMENT
2 yom   hx of CKD,   s/p DKA, DM.     cardiomyopathy,  CAD w/ stents, HTN, gout     generalized  weakness.  with   c/c h/o b/l leg weakness/ and   b/l arm weakness, r > l      p/w days of generalized weakness and AMS    per wife, pt was just in [facility] for DKA.  and  was d/c'd w/ fever and still weak.     more lethargic and altered /  resolved           *   pt  admitted  with fevers/ ams. / metabolic  encephalopathy   on  arrival, wbc  was12,000       bcx/ ucx,  was negative      no source of infection found        was on   Zosyn  ,  Ct  a/p,   ? cystitis/   ID eval dr harrington    * CKD,  crt is 5,4/ ,  renal  dr dickerson     *   c/c  anemia     *   DM,  follow  fs        on lantus/  known to  [facility]     *  ef 40     *   c/c  weakness  of   limbs/  [facility]       has  functional  quadriplegia/  CT  head, . old  arachnoid  cyst/  no intervention      *    s/p  acute  gout, right  wrist, on  prednisone/ colchicine     MRI  head/  C  spine.  old  infarcts    much improved, more  alert/  spoke  with wife/     wife  wants  rehab/,  unable  to take care of  pt     glucos e noted,  stopped  admelog/  follow fs/ pt  is  a  finnicky  eater     echo  from 10/28.  ef  30/   s/p cath, cad.  LAD/ RCA/ pt   had refuse d intervention     staged  PCI on 11/2  and on 11/3   to  RCA  and  LAD      was awaiting   d/c  to rehab,   now pt is covid  +. hence  not transferred     have to isolate/ on  remdisivir  per  ID     need  isolation   for  10  days, pe r current  protocol/  discusse d with wife          rad< from: CT Abdomen and Pelvis No Cont (10.22.22 @ 15:40) >  IMPRESSION:  Mild bladder wall thickening. Correlate with urinalysis  --- End of Report ---  < end of copied text >      rad< from: Transthoracic Echocardiogram (12.07.21 @ 11:24) >  nclusions:  1. Normal mitral valve. Minimal mitral regurgitation.  2. Calcified trileaflet aortic valve with normal opening.  Minimal aortic regurgitation.  3. Mild segmental left ventricular systolic dysfunction.  The basal inferior, basal septum, distal septum, distal  inferior, and the apex are akinetic. Recommend limited  repeat imaging with intravenous echo contrast to better  visualize the apex. (Per sonographer, patient did not agree  to echo contrast during the study.)  4. Mild diastolic dysfunction (Stage I).  5. Normal right ventricular size and function.  *** No previous Echo exam.    < end of copied text >

## 2022-11-13 NOTE — PROGRESS NOTE ADULT - SUBJECTIVE AND OBJECTIVE BOX
Chief complaint  Patient is a 62y old  Male who presents with a chief complaint of fevers (13 Nov 2022 12:06)   Review of systems  Patient in bed, looks comfortable, no hypoglycemic episodes.    Labs and Fingersticks  CAPILLARY BLOOD GLUCOSE      POCT Blood Glucose.: 243 mg/dL (13 Nov 2022 11:45)  POCT Blood Glucose.: 121 mg/dL (13 Nov 2022 07:41)  POCT Blood Glucose.: 297 mg/dL (12 Nov 2022 21:20)  POCT Blood Glucose.: 168 mg/dL (12 Nov 2022 16:27)      Anion Gap, Serum: 12 (11-13 @ 07:11)  Anion Gap, Serum: 14 (11-12 @ 07:32)      Calcium, Total Serum: 8.1 *L* (11-13 @ 07:11)  Calcium, Total Serum: 8.3 *L* (11-12 @ 07:32)  Albumin, Serum: 2.4 *L* (11-13 @ 07:11)  Albumin, Serum: 2.6 *L* (11-12 @ 07:32)    Alanine Aminotransferase (ALT/SGPT): 11 (11-13 @ 07:11)  Alanine Aminotransferase (ALT/SGPT): 13 (11-12 @ 07:32)  Alkaline Phosphatase, Serum: 92 (11-13 @ 07:11)  Alkaline Phosphatase, Serum: 98 (11-12 @ 07:32)  Aspartate Aminotransferase (AST/SGOT): 9 *L* (11-13 @ 07:11)  Aspartate Aminotransferase (AST/SGOT): 12 (11-12 @ 07:32)        11-13    140  |  101  |  40<H>  ----------------------------<  127<H>  3.9   |  27  |  2.88<H>    Ca    8.1<L>      13 Nov 2022 07:11  Phos  3.3     11-13    TPro  5.5<L>  /  Alb  2.4<L>  /  TBili  0.2  /  DBili  x   /  AST  9<L>  /  ALT  11  /  AlkPhos  92  11-13                        9.4    7.47  )-----------( 281      ( 13 Nov 2022 07:11 )             29.8     Medications  MEDICATIONS  (STANDING):  allopurinol 100 milliGRAM(s) Oral daily  amLODIPine   Tablet 5 milliGRAM(s) Oral daily  aspirin enteric coated 81 milliGRAM(s) Oral daily  atorvastatin 80 milliGRAM(s) Oral at bedtime  benzonatate 100 milliGRAM(s) Oral three times a day  carvedilol 25 milliGRAM(s) Oral every 12 hours  chlorhexidine 2% Cloths 1 Application(s) Topical daily  clopidogrel Tablet 75 milliGRAM(s) Oral daily  colchicine 0.3 milliGRAM(s) Oral every 48 hours  dextrose 5%. 1000 milliLiter(s) (50 mL/Hr) IV Continuous <Continuous>  dextrose 5%. 1000 milliLiter(s) (100 mL/Hr) IV Continuous <Continuous>  dextrose 50% Injectable 25 Gram(s) IV Push once  dextrose 50% Injectable 12.5 Gram(s) IV Push once  dextrose 50% Injectable 25 Gram(s) IV Push once  dextrose Oral Gel 15 Gram(s) Oral once  gabapentin 300 milliGRAM(s) Oral two times a day  glucagon  Injectable 1 milliGRAM(s) IntraMuscular once  heparin   Injectable 5000 Unit(s) SubCutaneous every 12 hours  insulin glargine Injectable (LANTUS) 12 Unit(s) SubCutaneous at bedtime  insulin lispro (ADMELOG) corrective regimen sliding scale   SubCutaneous three times a day before meals  insulin lispro (ADMELOG) corrective regimen sliding scale   SubCutaneous at bedtime  insulin lispro Injectable (ADMELOG) 6 Unit(s) SubCutaneous three times a day before meals  lidocaine/prilocaine Cream 1 Application(s) Topical <User Schedule>  tamsulosin 0.4 milliGRAM(s) Oral at bedtime  valsartan 320 milliGRAM(s) Oral daily      Physical Exam  General: Patient comfortable in bed  Vital Signs Last 12 Hrs  T(F): 97.4 (11-13-22 @ 10:58), Max: 97.9 (11-13-22 @ 06:24)  HR: 63 (11-13-22 @ 10:58) (63 - 72)  BP: 102/62 (11-13-22 @ 10:58) (102/62 - 142/78)  BP(mean): --  RR: 18 (11-13-22 @ 10:58) (18 - 18)  SpO2: 98% (11-13-22 @ 10:58) (98% - 98%)  Neck: No palpable thyroid nodules.  CVS: S1S2, No murmurs  Respiratory: No wheezing, no crepitations  GI: Abdomen soft, bowel sounds positive  Musculoskeletal:  edema lower extremities.   Skin: No skin rashes, no ecchymosis    Diagnostics    C-Peptide, Serum: Routine (11-03 @ 10:45)           Chief complaint  Patient is a 62y old  Male who presents with a chief complaint of fevers (13 Nov 2022 12:06)   Review of systems  Patient in bed, looks comfortable, no hypoglycemic episodes.    Labs and Fingersticks  CAPILLARY BLOOD GLUCOSE      POCT Blood Glucose.: 243 mg/dL (13 Nov 2022 11:45)  POCT Blood Glucose.: 121 mg/dL (13 Nov 2022 07:41)  POCT Blood Glucose.: 297 mg/dL (12 Nov 2022 21:20)  POCT Blood Glucose.: 168 mg/dL (12 Nov 2022 16:27)      Anion Gap, Serum: 12 (11-13 @ 07:11)  Anion Gap, Serum: 14 (11-12 @ 07:32)      Calcium, Total Serum: 8.1 *L* (11-13 @ 07:11)  Calcium, Total Serum: 8.3 *L* (11-12 @ 07:32)  Albumin, Serum: 2.4 *L* (11-13 @ 07:11)  Albumin, Serum: 2.6 *L* (11-12 @ 07:32)    Alanine Aminotransferase (ALT/SGPT): 11 (11-13 @ 07:11)  Alanine Aminotransferase (ALT/SGPT): 13 (11-12 @ 07:32)  Alkaline Phosphatase, Serum: 92 (11-13 @ 07:11)  Alkaline Phosphatase, Serum: 98 (11-12 @ 07:32)  Aspartate Aminotransferase (AST/SGOT): 9 *L* (11-13 @ 07:11)  Aspartate Aminotransferase (AST/SGOT): 12 (11-12 @ 07:32)        11-13    140  |  101  |  40<H>  ----------------------------<  127<H>  3.9   |  27  |  2.88<H>    Ca    8.1<L>      13 Nov 2022 07:11  Phos  3.3     11-13    TPro  5.5<L>  /  Alb  2.4<L>  /  TBili  0.2  /  DBili  x   /  AST  9<L>  /  ALT  11  /  AlkPhos  92  11-13                        9.4    7.47  )-----------( 281      ( 13 Nov 2022 07:11 )             29.8     Medications  MEDICATIONS  (STANDING):  allopurinol 100 milliGRAM(s) Oral daily  amLODIPine   Tablet 5 milliGRAM(s) Oral daily  aspirin enteric coated 81 milliGRAM(s) Oral daily  atorvastatin 80 milliGRAM(s) Oral at bedtime  benzonatate 100 milliGRAM(s) Oral three times a day  carvedilol 25 milliGRAM(s) Oral every 12 hours  chlorhexidine 2% Cloths 1 Application(s) Topical daily  clopidogrel Tablet 75 milliGRAM(s) Oral daily  colchicine 0.3 milliGRAM(s) Oral every 48 hours  dextrose 5%. 1000 milliLiter(s) (50 mL/Hr) IV Continuous <Continuous>  dextrose 5%. 1000 milliLiter(s) (100 mL/Hr) IV Continuous <Continuous>  dextrose 50% Injectable 25 Gram(s) IV Push once  dextrose 50% Injectable 12.5 Gram(s) IV Push once  dextrose 50% Injectable 25 Gram(s) IV Push once  dextrose Oral Gel 15 Gram(s) Oral once  gabapentin 300 milliGRAM(s) Oral two times a day  glucagon  Injectable 1 milliGRAM(s) IntraMuscular once  heparin   Injectable 5000 Unit(s) SubCutaneous every 12 hours  insulin glargine Injectable (LANTUS) 12 Unit(s) SubCutaneous at bedtime  insulin lispro (ADMELOG) corrective regimen sliding scale   SubCutaneous three times a day before meals  insulin lispro (ADMELOG) corrective regimen sliding scale   SubCutaneous at bedtime  insulin lispro Injectable (ADMELOG) 6 Unit(s) SubCutaneous three times a day before meals  lidocaine/prilocaine Cream 1 Application(s) Topical <User Schedule>  tamsulosin 0.4 milliGRAM(s) Oral at bedtime  valsartan 320 milliGRAM(s) Oral daily      Physical Exam  General: Patient comfortable in bed  Vital Signs Last 12 Hrs  T(F): 97.4 (11-13-22 @ 10:58), Max: 97.9 (11-13-22 @ 06:24)  HR: 63 (11-13-22 @ 10:58) (63 - 72)  BP: 102/62 (11-13-22 @ 10:58) (102/62 - 142/78)  BP(mean): --  RR: 18 (11-13-22 @ 10:58) (18 - 18)  SpO2: 98% (11-13-22 @ 10:58) (98% - 98%)  Neck: No palpable thyroid nodules.  CVS: S1S2, No murmurs  Respiratory: No wheezing, no crepitations  GI: Abdomen soft, bowel sounds positive  Musculoskeletal:  edema lower extremities.   Skin: No skin rashes, no ecchymosis    Diagnostics    C-Peptide, Serum: Routine (11-03 @ 10:45)

## 2022-11-13 NOTE — PROGRESS NOTE ADULT - SUBJECTIVE AND OBJECTIVE BOX
CARDIOLOGY     PROGRESS  NOTE   ________________________________________________    CHIEF COMPLAINT:Patient is a 62y old  Male who presents with a chief complaint of fevers (13 Nov 2022 11:49)  no complain  	  REVIEW OF SYSTEMS:  CONSTITUTIONAL: No fever, weight loss, or fatigue  EYES: No eye pain, visual disturbances, or discharge  ENT:  No difficulty hearing, tinnitus, vertigo; No sinus or throat pain  NECK: No pain or stiffness  RESPIRATORY: No cough, wheezing, chills or hemoptysis; No Shortness of Breath  CARDIOVASCULAR: No chest pain, palpitations, passing out, dizziness, or leg swelling  GASTROINTESTINAL: No abdominal or epigastric pain. No nausea, vomiting, or hematemesis; No diarrhea or constipation. No melena or hematochezia.  GENITOURINARY: No dysuria, frequency, hematuria, or incontinence  NEUROLOGICAL: No headaches, memory loss, loss of strength, numbness, or tremors  SKIN: No itching, burning, rashes, or lesions   LYMPH Nodes: No enlarged glands  ENDOCRINE: No heat or cold intolerance; No hair loss  MUSCULOSKELETAL: No joint pain or swelling; No muscle, back, or extremity pain  PSYCHIATRIC: No depression, anxiety, mood swings, or difficulty sleeping  HEME/LYMPH: No easy bruising, or bleeding gums  ALLERGY AND IMMUNOLOGIC: No hives or eczema	    [x ] All others negative	  [ ] Unable to obtain    PHYSICAL EXAM:  T(C): 36.3 (11-13-22 @ 10:58), Max: 36.6 (11-12-22 @ 13:02)  HR: 63 (11-13-22 @ 10:58) (63 - 78)  BP: 102/62 (11-13-22 @ 10:58) (102/62 - 142/78)  RR: 18 (11-13-22 @ 10:58) (18 - 18)  SpO2: 98% (11-13-22 @ 10:58) (96% - 98%)  Wt(kg): --  I&O's Summary    12 Nov 2022 07:01  -  13 Nov 2022 07:00  --------------------------------------------------------  IN: 1000 mL / OUT: 2050 mL / NET: -1050 mL        Appearance: Normal	  HEENT:   Normal oral mucosa, PERRL, EOMI	  Lymphatic: No lymphadenopathy  Cardiovascular: Normal S1 S2, No JVD, +murmurs, No edema  Respiratory: Lungs clear to auscultation	  Psychiatry: A & O x 3, Mood & affect appropriate  Gastrointestinal:  Soft, Non-tender, + BS	  Skin: No rashes, No ecchymoses, No cyanosis	  Neurologic: Non-focal  Extremities: Normal range of motion, No clubbing, cyanosis or edema  Vascular: Peripheral pulses palpable 2+ bilaterally    MEDICATIONS  (STANDING):  allopurinol 100 milliGRAM(s) Oral daily  amLODIPine   Tablet 5 milliGRAM(s) Oral daily  aspirin enteric coated 81 milliGRAM(s) Oral daily  atorvastatin 80 milliGRAM(s) Oral at bedtime  benzonatate 100 milliGRAM(s) Oral three times a day  carvedilol 25 milliGRAM(s) Oral every 12 hours  chlorhexidine 2% Cloths 1 Application(s) Topical daily  clopidogrel Tablet 75 milliGRAM(s) Oral daily  colchicine 0.3 milliGRAM(s) Oral every 48 hours  dextrose 5%. 1000 milliLiter(s) (50 mL/Hr) IV Continuous <Continuous>  dextrose 5%. 1000 milliLiter(s) (100 mL/Hr) IV Continuous <Continuous>  dextrose 50% Injectable 25 Gram(s) IV Push once  dextrose 50% Injectable 12.5 Gram(s) IV Push once  dextrose 50% Injectable 25 Gram(s) IV Push once  dextrose Oral Gel 15 Gram(s) Oral once  gabapentin 300 milliGRAM(s) Oral two times a day  glucagon  Injectable 1 milliGRAM(s) IntraMuscular once  heparin   Injectable 5000 Unit(s) SubCutaneous every 12 hours  insulin glargine Injectable (LANTUS) 12 Unit(s) SubCutaneous at bedtime  insulin lispro (ADMELOG) corrective regimen sliding scale   SubCutaneous three times a day before meals  insulin lispro (ADMELOG) corrective regimen sliding scale   SubCutaneous at bedtime  insulin lispro Injectable (ADMELOG) 6 Unit(s) SubCutaneous three times a day before meals  lidocaine/prilocaine Cream 1 Application(s) Topical <User Schedule>  tamsulosin 0.4 milliGRAM(s) Oral at bedtime  valsartan 320 milliGRAM(s) Oral daily      TELEMETRY: 	    ECG:  	  RADIOLOGY:  OTHER: 	  	  LABS:	 	    CARDIAC MARKERS:                                9.4    7.47  )-----------( 281      ( 13 Nov 2022 07:11 )             29.8     11-13    140  |  101  |  40<H>  ----------------------------<  127<H>  3.9   |  27  |  2.88<H>    Ca    8.1<L>      13 Nov 2022 07:11  Phos  3.3     11-13    TPro  5.5<L>  /  Alb  2.4<L>  /  TBili  0.2  /  DBili  x   /  AST  9<L>  /  ALT  11  /  AlkPhos  92  11-13    proBNP:   Lipid Profile:   HgA1c:   TSH:   PT/INR - ( 12 Nov 2022 07:32 )   PT: 12.1 sec;   INR: 1.05 ratio               Assessment and plan  ---------------------------  62 yom hx of CKD, DKA, DMtype2, cardiomyopathy w/ stents, HTN, gout p/w days of generalized weakness and AMS. Per wife, pt was just in [facility] for DKA. Was d/c'd w/ fever and still weak. Since tuesday, pt becoming more lethargic and altered especially when he waking up in morning. BG have been in the 200s since per cont BG monitor. Subjective fevers at home, wife feels that his abd is larger than it was before d/c from [facility]. Question of if pt has heather compliant w/ short and long acting insulin as wife works nights and is not there to give all meds at home. Unclear if pt has been falling at home but w/ bruising on his abd.   pt with hx of ashd, chf, cardiomyopathy, dm, s/p CARDIOMEM fu at [facility] with change of mental stratus and sob.  ct scan noted , no active chf  will adjust cardiac meds  will call chf team at [facility] to get Cardiomem readings  beta blocker/ hydralazine and Nitrate  ckd, renal follow up  check pro bnp  dvt prophylaxis  pt with known hx of cad, last stress test  in 07/2021, no ischemia with scar in mary septal and apical area  ct chest abdomen and pelvis noted, no pleural effusion or sign of chf  renal increased renal function last admission CR 4.8  continue asa daily /coreg will add ace as pt on HD  avoid excess fluid, may dc ivf, hx of chf , EF40%  s/p HD   start on ARB while on HD, repeat echo noted with sig lv dysfunction and wall motion abnormality  increase fluid withrawal with increase LV pressure  increase valsartan as tolertaed  s/p stent yesterday , now with second stent today px LAD  increase valsartan for better bp control  HDcontinue DAPT for 6 months, then will switch asa daily  dc planning  increase valsartan dose to control bp now up to 320 mg, bp is much better controlled  repeat echo in 3 months if decrease ef will need AICD  covid +, ID noted, remdesvir ?? on HD  continue asa/plavix for 6 months  possibilty of improvement of EF post intervention and meds changes

## 2022-11-14 ENCOUNTER — TRANSCRIPTION ENCOUNTER (OUTPATIENT)
Age: 62
End: 2022-11-14

## 2022-11-14 VITALS — SYSTOLIC BLOOD PRESSURE: 133 MMHG | HEART RATE: 75 BPM | DIASTOLIC BLOOD PRESSURE: 72 MMHG

## 2022-11-14 LAB
GLUCOSE BLDC GLUCOMTR-MCNC: 103 MG/DL — HIGH (ref 70–99)
GLUCOSE BLDC GLUCOMTR-MCNC: 148 MG/DL — HIGH (ref 70–99)
GLUCOSE BLDC GLUCOMTR-MCNC: 92 MG/DL — SIGNIFICANT CHANGE UP (ref 70–99)

## 2022-11-14 PROCEDURE — U0005: CPT

## 2022-11-14 PROCEDURE — C9600: CPT | Mod: RC

## 2022-11-14 PROCEDURE — 85610 PROTHROMBIN TIME: CPT

## 2022-11-14 PROCEDURE — 82728 ASSAY OF FERRITIN: CPT

## 2022-11-14 PROCEDURE — 74176 CT ABD & PELVIS W/O CONTRAST: CPT | Mod: MA

## 2022-11-14 PROCEDURE — 87640 STAPH A DNA AMP PROBE: CPT

## 2022-11-14 PROCEDURE — 0225U NFCT DS DNA&RNA 21 SARSCOV2: CPT

## 2022-11-14 PROCEDURE — 82803 BLOOD GASES ANY COMBINATION: CPT

## 2022-11-14 PROCEDURE — 71045 X-RAY EXAM CHEST 1 VIEW: CPT

## 2022-11-14 PROCEDURE — 80053 COMPREHEN METABOLIC PANEL: CPT

## 2022-11-14 PROCEDURE — 82435 ASSAY OF BLOOD CHLORIDE: CPT

## 2022-11-14 PROCEDURE — 97110 THERAPEUTIC EXERCISES: CPT

## 2022-11-14 PROCEDURE — 82947 ASSAY GLUCOSE BLOOD QUANT: CPT

## 2022-11-14 PROCEDURE — 85379 FIBRIN DEGRADATION QUANT: CPT

## 2022-11-14 PROCEDURE — 97116 GAIT TRAINING THERAPY: CPT

## 2022-11-14 PROCEDURE — 85730 THROMBOPLASTIN TIME PARTIAL: CPT

## 2022-11-14 PROCEDURE — 85018 HEMOGLOBIN: CPT

## 2022-11-14 PROCEDURE — 82565 ASSAY OF CREATININE: CPT

## 2022-11-14 PROCEDURE — 84100 ASSAY OF PHOSPHORUS: CPT

## 2022-11-14 PROCEDURE — 70551 MRI BRAIN STEM W/O DYE: CPT

## 2022-11-14 PROCEDURE — 72141 MRI NECK SPINE W/O DYE: CPT

## 2022-11-14 PROCEDURE — 80076 HEPATIC FUNCTION PANEL: CPT

## 2022-11-14 PROCEDURE — 36415 COLL VENOUS BLD VENIPUNCTURE: CPT

## 2022-11-14 PROCEDURE — 82330 ASSAY OF CALCIUM: CPT

## 2022-11-14 PROCEDURE — C1725: CPT

## 2022-11-14 PROCEDURE — 84681 ASSAY OF C-PEPTIDE: CPT

## 2022-11-14 PROCEDURE — 85025 COMPLETE CBC W/AUTO DIFF WBC: CPT

## 2022-11-14 PROCEDURE — 93458 L HRT ARTERY/VENTRICLE ANGIO: CPT

## 2022-11-14 PROCEDURE — 96375 TX/PRO/DX INJ NEW DRUG ADDON: CPT

## 2022-11-14 PROCEDURE — C1887: CPT

## 2022-11-14 PROCEDURE — 85014 HEMATOCRIT: CPT

## 2022-11-14 PROCEDURE — 97162 PT EVAL MOD COMPLEX 30 MIN: CPT

## 2022-11-14 PROCEDURE — 87086 URINE CULTURE/COLONY COUNT: CPT

## 2022-11-14 PROCEDURE — C8929: CPT

## 2022-11-14 PROCEDURE — C1874: CPT

## 2022-11-14 PROCEDURE — C1769: CPT

## 2022-11-14 PROCEDURE — U0003: CPT

## 2022-11-14 PROCEDURE — 85027 COMPLETE CBC AUTOMATED: CPT

## 2022-11-14 PROCEDURE — 87040 BLOOD CULTURE FOR BACTERIA: CPT

## 2022-11-14 PROCEDURE — 83036 HEMOGLOBIN GLYCOSYLATED A1C: CPT

## 2022-11-14 PROCEDURE — 71250 CT THORAX DX C-: CPT | Mod: MA

## 2022-11-14 PROCEDURE — 99285 EMERGENCY DEPT VISIT HI MDM: CPT

## 2022-11-14 PROCEDURE — 70450 CT HEAD/BRAIN W/O DYE: CPT

## 2022-11-14 PROCEDURE — 93005 ELECTROCARDIOGRAM TRACING: CPT

## 2022-11-14 PROCEDURE — 80202 ASSAY OF VANCOMYCIN: CPT

## 2022-11-14 PROCEDURE — 96374 THER/PROPH/DIAG INJ IV PUSH: CPT

## 2022-11-14 PROCEDURE — 87641 MR-STAPH DNA AMP PROBE: CPT

## 2022-11-14 PROCEDURE — 82962 GLUCOSE BLOOD TEST: CPT

## 2022-11-14 PROCEDURE — 80074 ACUTE HEPATITIS PANEL: CPT

## 2022-11-14 PROCEDURE — 97165 OT EVAL LOW COMPLEX 30 MIN: CPT

## 2022-11-14 PROCEDURE — 97530 THERAPEUTIC ACTIVITIES: CPT

## 2022-11-14 PROCEDURE — 84295 ASSAY OF SERUM SODIUM: CPT

## 2022-11-14 PROCEDURE — 81001 URINALYSIS AUTO W/SCOPE: CPT

## 2022-11-14 PROCEDURE — 84132 ASSAY OF SERUM POTASSIUM: CPT

## 2022-11-14 PROCEDURE — 93971 EXTREMITY STUDY: CPT

## 2022-11-14 PROCEDURE — 86706 HEP B SURFACE ANTIBODY: CPT

## 2022-11-14 PROCEDURE — 97535 SELF CARE MNGMENT TRAINING: CPT

## 2022-11-14 PROCEDURE — 86140 C-REACTIVE PROTEIN: CPT

## 2022-11-14 PROCEDURE — 99261: CPT

## 2022-11-14 PROCEDURE — C1894: CPT

## 2022-11-14 PROCEDURE — 83605 ASSAY OF LACTIC ACID: CPT

## 2022-11-14 PROCEDURE — 80048 BASIC METABOLIC PNL TOTAL CA: CPT

## 2022-11-14 PROCEDURE — 86403 PARTICLE AGGLUT ANTBDY SCRN: CPT

## 2022-11-14 RX ORDER — VALSARTAN 80 MG/1
1 TABLET ORAL
Qty: 0 | Refills: 0 | DISCHARGE
Start: 2022-11-14

## 2022-11-14 RX ORDER — TICAGRELOR 90 MG/1
1 TABLET ORAL
Qty: 0 | Refills: 0 | DISCHARGE

## 2022-11-14 RX ORDER — ERYTHROPOIETIN 10000 [IU]/ML
10000 INJECTION, SOLUTION INTRAVENOUS; SUBCUTANEOUS ONCE
Refills: 0 | Status: DISCONTINUED | OUTPATIENT
Start: 2022-11-14 | End: 2022-11-14

## 2022-11-14 RX ORDER — INSULIN LISPRO 100/ML
1 VIAL (ML) SUBCUTANEOUS
Qty: 0 | Refills: 0 | DISCHARGE
Start: 2022-11-14

## 2022-11-14 RX ORDER — INSULIN LISPRO 100/ML
8 VIAL (ML) SUBCUTANEOUS
Qty: 0 | Refills: 0 | DISCHARGE

## 2022-11-14 RX ORDER — INSULIN LISPRO 100/ML
6 VIAL (ML) SUBCUTANEOUS
Qty: 0 | Refills: 0 | DISCHARGE
Start: 2022-11-14

## 2022-11-14 RX ORDER — AMLODIPINE BESYLATE 2.5 MG/1
1 TABLET ORAL
Qty: 0 | Refills: 0 | DISCHARGE
Start: 2022-11-14

## 2022-11-14 RX ORDER — ACETAMINOPHEN 500 MG
2 TABLET ORAL
Qty: 0 | Refills: 0 | DISCHARGE
Start: 2022-11-14

## 2022-11-14 RX ORDER — INSULIN GLARGINE 100 [IU]/ML
12 INJECTION, SOLUTION SUBCUTANEOUS
Qty: 0 | Refills: 0 | DISCHARGE
Start: 2022-11-14

## 2022-11-14 RX ORDER — CLOPIDOGREL BISULFATE 75 MG/1
1 TABLET, FILM COATED ORAL
Qty: 0 | Refills: 0 | DISCHARGE
Start: 2022-11-14

## 2022-11-14 RX ORDER — ERYTHROPOIETIN 10000 [IU]/ML
1 INJECTION, SOLUTION INTRAVENOUS; SUBCUTANEOUS
Qty: 0 | Refills: 0 | DISCHARGE
Start: 2022-11-14

## 2022-11-14 RX ORDER — COLCHICINE 0.6 MG
0.5 TABLET ORAL
Qty: 0 | Refills: 0 | DISCHARGE
Start: 2022-11-14

## 2022-11-14 RX ADMIN — CHLORHEXIDINE GLUCONATE 1 APPLICATION(S): 213 SOLUTION TOPICAL at 11:38

## 2022-11-14 RX ADMIN — Medication 6 UNIT(S): at 12:06

## 2022-11-14 RX ADMIN — VALSARTAN 320 MILLIGRAM(S): 80 TABLET ORAL at 06:04

## 2022-11-14 RX ADMIN — HEPARIN SODIUM 5000 UNIT(S): 5000 INJECTION INTRAVENOUS; SUBCUTANEOUS at 06:04

## 2022-11-14 RX ADMIN — CLOPIDOGREL BISULFATE 75 MILLIGRAM(S): 75 TABLET, FILM COATED ORAL at 11:36

## 2022-11-14 RX ADMIN — Medication 6 UNIT(S): at 16:43

## 2022-11-14 RX ADMIN — CARVEDILOL PHOSPHATE 25 MILLIGRAM(S): 80 CAPSULE, EXTENDED RELEASE ORAL at 06:04

## 2022-11-14 RX ADMIN — GABAPENTIN 300 MILLIGRAM(S): 400 CAPSULE ORAL at 06:04

## 2022-11-14 RX ADMIN — CARVEDILOL PHOSPHATE 25 MILLIGRAM(S): 80 CAPSULE, EXTENDED RELEASE ORAL at 18:28

## 2022-11-14 RX ADMIN — AMLODIPINE BESYLATE 5 MILLIGRAM(S): 2.5 TABLET ORAL at 06:04

## 2022-11-14 RX ADMIN — HEPARIN SODIUM 5000 UNIT(S): 5000 INJECTION INTRAVENOUS; SUBCUTANEOUS at 18:28

## 2022-11-14 RX ADMIN — Medication 81 MILLIGRAM(S): at 11:35

## 2022-11-14 RX ADMIN — GABAPENTIN 300 MILLIGRAM(S): 400 CAPSULE ORAL at 18:28

## 2022-11-14 RX ADMIN — Medication 100 MILLIGRAM(S): at 06:05

## 2022-11-14 RX ADMIN — Medication 6 UNIT(S): at 08:26

## 2022-11-14 RX ADMIN — Medication 100 MILLIGRAM(S): at 11:35

## 2022-11-14 RX ADMIN — Medication 100 MILLIGRAM(S): at 15:13

## 2022-11-14 NOTE — PROGRESS NOTE ADULT - SUBJECTIVE AND OBJECTIVE BOX
CARDIOLOGY     PROGRESS  NOTE   ________________________________________________    CHIEF COMPLAINT:Patient is a 62y old  Male who presents with a chief complaint of fevers (13 Nov 2022 12:50)  no complain  	  REVIEW OF SYSTEMS:  CONSTITUTIONAL: No fever, weight loss, or fatigue  EYES: No eye pain, visual disturbances, or discharge  ENT:  No difficulty hearing, tinnitus, vertigo; No sinus or throat pain  NECK: No pain or stiffness  RESPIRATORY: No cough, wheezing, chills or hemoptysis; No Shortness of Breath  CARDIOVASCULAR: No chest pain, palpitations, passing out, dizziness, or leg swelling  GASTROINTESTINAL: No abdominal or epigastric pain. No nausea, vomiting, or hematemesis; No diarrhea or constipation. No melena or hematochezia.  GENITOURINARY: No dysuria, frequency, hematuria, or incontinence  NEUROLOGICAL: No headaches, memory loss, loss of strength, numbness, or tremors  SKIN: No itching, burning, rashes, or lesions   LYMPH Nodes: No enlarged glands  ENDOCRINE: No heat or cold intolerance; No hair loss  MUSCULOSKELETAL: No joint pain or swelling; No muscle, back, or extremity pain  PSYCHIATRIC: No depression, anxiety, mood swings, or difficulty sleeping  HEME/LYMPH: No easy bruising, or bleeding gums  ALLERGY AND IMMUNOLOGIC: No hives or eczema	    [ ] All others negative	  [ ] Unable to obtain    PHYSICAL EXAM:  T(C): 36.6 (11-14-22 @ 05:16), Max: 37.3 (11-13-22 @ 21:45)  HR: 74 (11-14-22 @ 05:16) (63 - 78)  BP: 147/78 (11-14-22 @ 05:16) (102/62 - 149/77)  RR: 18 (11-14-22 @ 05:16) (16 - 18)  SpO2: 97% (11-14-22 @ 05:16) (95% - 98%)  Wt(kg): --  I&O's Summary    13 Nov 2022 07:01  -  14 Nov 2022 07:00  --------------------------------------------------------  IN: 1380 mL / OUT: 1180 mL / NET: 200 mL        Appearance: Normal	  HEENT:   Normal oral mucosa, PERRL, EOMI	  Lymphatic: No lymphadenopathy  Cardiovascular: Normal S1 S2, No JVD, + murmurs, No edema  Respiratory: Lungs clear to auscultation	  Gastrointestinal:  Soft, Non-tender, + BS	  Skin: No rashes, No ecchymoses, No cyanosis	  Neurologic: Non-focal  Extremities: Normal range of motion, No clubbing, cyanosis or edema  Vascular: Peripheral pulses palpable 2+ bilaterally    MEDICATIONS  (STANDING):  allopurinol 100 milliGRAM(s) Oral daily  amLODIPine   Tablet 5 milliGRAM(s) Oral daily  aspirin enteric coated 81 milliGRAM(s) Oral daily  atorvastatin 80 milliGRAM(s) Oral at bedtime  benzonatate 100 milliGRAM(s) Oral three times a day  carvedilol 25 milliGRAM(s) Oral every 12 hours  chlorhexidine 2% Cloths 1 Application(s) Topical daily  clopidogrel Tablet 75 milliGRAM(s) Oral daily  colchicine 0.3 milliGRAM(s) Oral every 48 hours  dextrose 5%. 1000 milliLiter(s) (50 mL/Hr) IV Continuous <Continuous>  dextrose 5%. 1000 milliLiter(s) (100 mL/Hr) IV Continuous <Continuous>  dextrose 50% Injectable 25 Gram(s) IV Push once  dextrose 50% Injectable 12.5 Gram(s) IV Push once  dextrose 50% Injectable 25 Gram(s) IV Push once  dextrose Oral Gel 15 Gram(s) Oral once  gabapentin 300 milliGRAM(s) Oral two times a day  glucagon  Injectable 1 milliGRAM(s) IntraMuscular once  heparin   Injectable 5000 Unit(s) SubCutaneous every 12 hours  insulin glargine Injectable (LANTUS) 12 Unit(s) SubCutaneous at bedtime  insulin lispro (ADMELOG) corrective regimen sliding scale   SubCutaneous at bedtime  insulin lispro (ADMELOG) corrective regimen sliding scale   SubCutaneous three times a day before meals  insulin lispro Injectable (ADMELOG) 6 Unit(s) SubCutaneous three times a day before meals  lidocaine/prilocaine Cream 1 Application(s) Topical <User Schedule>  tamsulosin 0.4 milliGRAM(s) Oral at bedtime  valsartan 320 milliGRAM(s) Oral daily      TELEMETRY: 	    ECG:  	  RADIOLOGY:  OTHER: 	  	  LABS:	 	    CARDIAC MARKERS:                                9.4    7.47  )-----------( 281      ( 13 Nov 2022 07:11 )             29.8     11-13    140  |  101  |  40<H>  ----------------------------<  127<H>  3.9   |  27  |  2.88<H>    Ca    8.1<L>      13 Nov 2022 07:11  Phos  3.3     11-13    TPro  5.5<L>  /  Alb  2.4<L>  /  TBili  0.2  /  DBili  x   /  AST  9<L>  /  ALT  11  /  AlkPhos  92  11-13    proBNP:   Lipid Profile:   HgA1c:   TSH:         Assessment and plan  ---------------------------  62 yom hx of CKD, DKA, DMtype2, cardiomyopathy w/ stents, HTN, gout p/w days of generalized weakness and AMS. Per wife, pt was just in [facility] for DKA. Was d/c'd w/ fever and still weak. Since tuesday, pt becoming more lethargic and altered especially when he waking up in morning. BG have been in the 200s since per cont BG monitor. Subjective fevers at home, wife feels that his abd is larger than it was before d/c from [facility]. Question of if pt has heather compliant w/ short and long acting insulin as wife works nights and is not there to give all meds at home. Unclear if pt has been falling at home but w/ bruising on his abd.   pt with hx of ashd, chf, cardiomyopathy, dm, s/p CARDIOMEM fu at [facility] with change of mental stratus and sob.  ct scan noted , no active chf  will adjust cardiac meds  will call chf team at [facility] to get Cardiomem readings  beta blocker/ hydralazine and Nitrate  ckd, renal follow up  check pro bnp  dvt prophylaxis  pt with known hx of cad, last stress test  in 07/2021, no ischemia with scar in mary septal and apical area  ct chest abdomen and pelvis noted, no pleural effusion or sign of chf  renal increased renal function last admission CR 4.8  continue asa daily /coreg will add ace as pt on HD  avoid excess fluid, may dc ivf, hx of chf , EF40%  s/p HD   start on ARB while on HD, repeat echo noted with sig lv dysfunction and wall motion abnormality  increase fluid withrawal with increase LV pressure  increase valsartan as tolertaed  s/p stent yesterday , now with second stent today px LAD  increase valsartan for better bp control  HDcontinue DAPT for 6 months, then will switch asa daily  dc planning  increase valsartan dose to control bp now up to 320 mg, bp is much better controlled  repeat echo in 3 months if decrease ef will need AICD  covid +   continue asa/plavix for 6 months  possibility of improvement of EF post intervention and meds changes  will increase Norvasc if bp elevated

## 2022-11-14 NOTE — PROGRESS NOTE ADULT - ASSESSMENT
2 yom   hx of CKD,   s/p DKA, DM.     cardiomyopathy,  CAD w/ stents, HTN, gout     generalized  weakness.  with   c/c h/o b/l leg weakness/ and   b/l arm weakness, r > l      p/w days of generalized weakness and AMS    per wife, pt was just in [facility] for DKA.  and  was d/c'd w/ fever and still weak.     more lethargic and altered /  resolved           *   pt  admitted  with fevers/ ams. / metabolic  encephalopathy   on  arrival, wbc  was12,000       bcx/ ucx,  was negative      no source of infection found        was on   Zosyn  ,  Ct  a/p,   ? cystitis/   ID eval dr harrington    * CKD,  crt is 5,4/ ,  renal  dr dickerson     *   c/c  anemia     *   DM,  follow  fs        on lantus/  known to  [facility]     *  ef 40     *   c/c  weakness  of   limbs/  neuro ga mccain       has  functional  quadriplegia/  CT  head, . old  arachnoid  cyst/  no intervention      *    s/p  acute  gout, right  wrist, on  prednisone/ colchicine     MRI  head/  C  spine.  old  infarcts    much improved, more  alert/  spoke  with wife/     wife  wants  rehab/,  unable  to take care of  pt     glucos e noted,  stopped  admelog/  follow fs/ pt  is  a  finnicky  eater     echo  from 10/28.  ef  30/   s/p cath, cad.  LAD/ RCA/ pt   had refuse d intervention     staged  PCI on 11/2  and on 11/3   to  RCA  and  LAD      was awaiting   d/c  to rehab,   now pt is covid  +. hence  not transferred     have to isolate/ was on  remdisivir  per  ID     need  isolation   for  10  days, per  current  protocol/  discussed  with wife  plan, is rehab.  upon completion of  his  isolation          rad< from: CT Abdomen and Pelvis No Cont (10.22.22 @ 15:40) >  IMPRESSION:  Mild bladder wall thickening. Correlate with urinalysis  --- End of Report ---  < end of copied text >      rad< from: Transthoracic Echocardiogram (12.07.21 @ 11:24) >  nclusions:  1. Normal mitral valve. Minimal mitral regurgitation.  2. Calcified trileaflet aortic valve with normal opening.  Minimal aortic regurgitation.  3. Mild segmental left ventricular systolic dysfunction.  The basal inferior, basal septum, distal septum, distal  inferior, and the apex are akinetic. Recommend limited  repeat imaging with intravenous echo contrast to better  visualize the apex. (Per sonographer, patient did not agree  to echo contrast during the study.)  4. Mild diastolic dysfunction (Stage I).  5. Normal right ventricular size and function.  *** No previous Echo exam.    < end of copied text >

## 2022-11-14 NOTE — PROGRESS NOTE ADULT - ASSESSMENT
61 yo male with CHF, CAD,CKD, gout, and T2DM admitted with fever and encephalopathy on 10/22.  Fever short lived, s/p 5 days of empiric meropenem.  He has underlying advanced CKD and HD was started.  He has well documented CAD.  He had mild COVID diagnosed on 11/10, s/p 3 days of RDV.  He is afebrile nand stable of antibiotics  No ID objection to discharge planning  No additional ID w/u planned.  We will therefore stop actively following, please call if ID issues arise.

## 2022-11-14 NOTE — PROGRESS NOTE ADULT - SUBJECTIVE AND OBJECTIVE BOX
afberile    REVIEW OF SYSTEMS:  GEN: no fever,    no chills  RESP: no SOB,   no cough  CVS: no chest pain,   no palpitations  GI: no abdominal pain,   no nausea,   no vomiting,   no constipation,   no diarrhea  : no dysuria,   no frequency  NEURO: no headache,   no dizziness  PSYCH: no depression,   not anxious  Derm : no rash    MEDICATIONS  (STANDING):  allopurinol 100 milliGRAM(s) Oral daily  amLODIPine   Tablet 5 milliGRAM(s) Oral daily  aspirin enteric coated 81 milliGRAM(s) Oral daily  atorvastatin 80 milliGRAM(s) Oral at bedtime  benzonatate 100 milliGRAM(s) Oral three times a day  carvedilol 25 milliGRAM(s) Oral every 12 hours  chlorhexidine 2% Cloths 1 Application(s) Topical daily  clopidogrel Tablet 75 milliGRAM(s) Oral daily  colchicine 0.3 milliGRAM(s) Oral every 48 hours  dextrose 5%. 1000 milliLiter(s) (50 mL/Hr) IV Continuous <Continuous>  dextrose 5%. 1000 milliLiter(s) (100 mL/Hr) IV Continuous <Continuous>  dextrose 50% Injectable 25 Gram(s) IV Push once  dextrose 50% Injectable 12.5 Gram(s) IV Push once  dextrose 50% Injectable 25 Gram(s) IV Push once  dextrose Oral Gel 15 Gram(s) Oral once  gabapentin 300 milliGRAM(s) Oral two times a day  glucagon  Injectable 1 milliGRAM(s) IntraMuscular once  heparin   Injectable 5000 Unit(s) SubCutaneous every 12 hours  insulin glargine Injectable (LANTUS) 12 Unit(s) SubCutaneous at bedtime  insulin lispro (ADMELOG) corrective regimen sliding scale   SubCutaneous at bedtime  insulin lispro (ADMELOG) corrective regimen sliding scale   SubCutaneous three times a day before meals  insulin lispro Injectable (ADMELOG) 6 Unit(s) SubCutaneous three times a day before meals  lidocaine/prilocaine Cream 1 Application(s) Topical <User Schedule>  tamsulosin 0.4 milliGRAM(s) Oral at bedtime  valsartan 320 milliGRAM(s) Oral daily    MEDICATIONS  (PRN):  acetaminophen     Tablet .. 650 milliGRAM(s) Oral every 6 hours PRN Mild Pain (1 - 3), Moderate Pain (4 - 6)  guaiFENesin Oral Liquid (Sugar-Free) 100 milliGRAM(s) Oral every 6 hours PRN Cough      Vital Signs Last 24 Hrs  T(C): 36.6 (14 Nov 2022 05:16), Max: 37.3 (13 Nov 2022 21:45)  T(F): 97.8 (14 Nov 2022 05:16), Max: 99.1 (13 Nov 2022 21:45)  HR: 74 (14 Nov 2022 05:16) (63 - 78)  BP: 147/78 (14 Nov 2022 05:16) (102/62 - 149/77)  BP(mean): 101 (13 Nov 2022 17:10) (101 - 101)  RR: 18 (14 Nov 2022 05:16) (16 - 18)  SpO2: 97% (14 Nov 2022 05:16) (95% - 98%)    Parameters below as of 14 Nov 2022 05:16  Patient On (Oxygen Delivery Method): room air      CAPILLARY BLOOD GLUCOSE      POCT Blood Glucose.: 103 mg/dL (14 Nov 2022 07:59)  POCT Blood Glucose.: 297 mg/dL (13 Nov 2022 21:27)  POCT Blood Glucose.: 268 mg/dL (13 Nov 2022 16:32)  POCT Blood Glucose.: 243 mg/dL (13 Nov 2022 11:45)    I&O's Summary    13 Nov 2022 07:01  -  14 Nov 2022 07:00  --------------------------------------------------------  IN: 1380 mL / OUT: 1180 mL / NET: 200 mL        PHYSICAL EXAM:  HEAD:  Atraumatic, Normocephalic  NECK: Supple, No   JVD  CHEST/LUNG:   no     rales,     no,    rhonchi  HEART: Regular rate and rhythm;         murmur  ABDOMEN: Soft, Nontender, ;   EXTREMITIES:    no    edema  NEUROLOGY:  alert    LABS:                        9.4    7.47  )-----------( 281      ( 13 Nov 2022 07:11 )             29.8     11-13    140  |  101  |  40<H>  ----------------------------<  127<H>  3.9   |  27  |  2.88<H>    Ca    8.1<L>      13 Nov 2022 07:11  Phos  3.3     11-13    TPro  5.5<L>  /  Alb  2.4<L>  /  TBili  0.2  /  DBili  x   /  AST  9<L>  /  ALT  11  /  AlkPhos  92  11-13                            Consultant(s) Notes Reviewed:      Care Discussed with Consultants/Other Providers:

## 2022-11-14 NOTE — PROGRESS NOTE ADULT - NS ATTEND OPT1 GEN_ALL_CORE
I independently performed the documented:
I independently performed the documented:
I attest my time as attending is greater than 50% of the total combined time spent on qualifying patient care activities by the PA/NP and attending.

## 2022-11-14 NOTE — PROGRESS NOTE ADULT - REASON FOR ADMISSION
fevers

## 2022-11-14 NOTE — DISCHARGE NOTE NURSING/CASE MANAGEMENT/SOCIAL WORK - HISTORY OF COVID-19 VACCINATION
Nursing Transfer Note          11/8/2018       [id number]    [facility]    Transfer with cardiac monitoring , transport monitor, & ambue with Og RT    Transported by HOMERO Rincon RN & Og RT    Medicines sent: precedex, propofol, & NS infusions    Chart send with patient: Yes    Notified: spouse Mary at bedside    All pre-op preparation implemented. Hibiclens bath complete. 12 lead in chart. VSS  
  Nursing Transfer Note      11/8/2018     Transfer From: PACU    Transfer via bed    Transfer with ambue bag to O2, cardiac monitoring    Transported by RN      Medicines sent: na    Chart send with patient: Yes    Notified: Family    Patient reassessed at: 11/7/2018 2215    Upon arrival to floor: cardiac monitor applied, patient oriented to room, call bell in reach and bed in lowest position  
  Nursing Transfer Note      11/9/2018     Transfer To: CSU    Transfer via stretcher    Transfer with NC 2L to O2, cardiac monitoring    Transported by Amber DONALDSON    Chart send with patient: Yes    Notified: spouse    Upon arrival to floor: cardiac monitor applied, patient oriented to room, call bell in reach and bed in lowest position  
Vaccine status unknown

## 2022-11-14 NOTE — PROGRESS NOTE ADULT - PROBLEM SELECTOR PLAN 1
Will continue current insulin regimen for now. Will continue monitoring FS and FU.  Patient counseled for compliance with consistent low carb diet and exercise as tolerated outpatient.    DC PLAN: Patient was on basal bolus insulin at home; If blood sugars are stable, suggest DC on current insulin doses.  Follow Up: 4 weeks
Will continue current insulin regimen for now. Will continue monitoring FS and FU.  Patient counseled for compliance with consistent low carb diet and exercise as tolerated outpatient.    DC PLAN: Patient was on basal bolus insulin at home; If blood sugars are stable, suggest DC on current insulin doses.  Follow Up: 4 weeks
Expect blood sugars to start trending down 2/2 steroid DC.  Will continue current insulin regimen for now. Will continue monitoring FS and FU.  Patient counseled for compliance with consistent low carb diet.    DC PLAN: Patient was on basal bolus insulin at home; If blood sugars are stable, suggest DC on current insulin doses.  Follow Up: 4 weeks
Expect blood sugars to start trending down 2/2 steroid DC.  Will decrease Lantus to 12u at bedtime.  Will decrease Admelog to 6u before each meal and continue Admelog correction scale coverage. Will continue monitoring FS and FU.  Patient counseled for compliance with consistent low carb diet.    DC PLAN: Patient was on basal bolus insulin at home; If blood sugars are stable, suggest DC on current insulin doses.  Follow Up: 4 weeks
Will increase Lantus to 30u at bedtime.  Will increase Admelog to 12u before each meal and continue Admelog correction scale coverage. Will continue monitoring FS and FU, adjust dose as steroids tapered.  Patient counseled for compliance with consistent low carb diet.
Will continue current insulin regimen for now. Will continue monitoring  blood sugars, will Follow up.  Will start Lantus 15 units at bed time.  Will start Admelog 7 units before each meal in addition to Admelog correction scale coverage.  Patient counseled for compliance with consistent low carb diet.    DC PLAN:   Follow Up: 4 weeks
Will continue current insulin regimen. Will continue monitoring FS and FU.  Patient counseled for compliance with consistent low carb diet and exercise as tolerated outpatient.    DC PLAN: Patient was on basal bolus insulin at home; If blood sugars are stable, suggest DC on current insulin doses.  Follow Up: 4 weeks
Will decrease Lantus to 5u at bedtime and continue Admelog correction scale coverage. Will continue monitoring FS and FU.  Patient counseled for compliance with consistent low carb diet and exercise as tolerated outpatient.    DC PLAN: Patient was on basal bolus insulin at home; If blood sugars are stable, suggest DC on current insulin doses.  Follow Up: 4 weeks
Will continue current insulin regimen for now. Will continue monitoring FS and FU.  Patient counseled for compliance with consistent low carb diet and exercise as tolerated outpatient.    DC PLAN: Patient was on basal bolus insulin at home; If blood sugars are stable, suggest DC on current insulin doses.  Follow Up: 4 weeks
Will increase Lantus to 12u at bedtime.  Will increase Admelog to 7u before each meal and continue Admelog correction scale coverage. Will continue monitoring FS and FU.  Patient counseled for compliance with consistent low carb diet.    DC PLAN: Patient was on basal bolus insulin at home; If blood sugars are stable, suggest DC on current insulin doses.  Follow Up: 4 weeks
Will increase Lantus to 26u at bedtime.  Will increase Admelog to 11u before each meal and continue Admelog correction scale coverage. Will continue monitoring FS and FU, adjust dose as steroids tapered.  Patient counseled for compliance with consistent low carb diet.
Will increase Lantus to 20u at bedtime.  Will increase Admelog to 8u before each meal and continue Admelog correction scale coverage. Will continue monitoring FS and FU, adjust dose as steroids tapered.  Patient counseled for compliance with consistent low carb diet.
Will increase Lantus to 33u at bedtime.  Will increase Admelog to 13u before each meal and continue Admelog correction scale coverage. Will continue monitoring FS and adjust insulin dose accordingly.  Patient counseled for compliance with consistent low carb diet.
Will continue current insulin regimen for now. Will continue monitoring FS and FU.  Patient counseled for compliance with consistent low carb diet and exercise as tolerated outpatient.    DC PLAN: Patient was on basal bolus insulin at home; If blood sugars are stable, suggest DC on current insulin doses.  Follow Up: 4 weeks
Will continue current insulin regimen for now. Will continue monitoring FS, log, and FU.  Patient counseled for compliance with consistent low carb diet and exercise as tolerated outpatient.    DC PLAN: Patient was on basal bolus insulin at home; If blood sugars are stable, suggest DC on current insulin doses.  Follow Up: 4 weeks
Will decrease Lantus to 14u at bedtime.  Will decrease Admelog to 5u before each meal and continue Admelog correction scale coverage. Will continue monitoring FS and FU.  Patient counseled for compliance with consistent low carb diet and exercise as tolerated outpatient.    DC PLAN: Patient was on basal bolus insulin at home; If blood sugars are stable, suggest DC on current insulin doses.  Follow Up: 4 weeks
Will decrease Lantus to 18u at bedtime.  Will decrease Admelog to 6u before each meal and continue Admelog correction scale coverage. Will continue monitoring FS and FU.  Patient counseled for compliance with consistent low carb diet and exercise as tolerated outpatient.    DC PLAN: Patient was on basal bolus insulin at home; If blood sugars are stable, suggest DC on current insulin doses.  Follow Up: 4 weeks
Will increase Lantus to 10u at bedtime.  Will increase Admelog to 5u before each meal and continue Admelog correction scale coverage. Will continue monitoring FS and FU.  Patient counseled for compliance with consistent low carb diet and exercise as tolerated outpatient.    DC PLAN: Patient was on basal bolus insulin at home; If blood sugars are stable, suggest DC on current insulin doses.  Follow Up: 4 weeks
Will increase Lantus to 15u at bedtime.  Will increase Admelog to 8u before each meal and continue Admelog correction scale coverage. Will continue monitoring FS and FU.  Patient counseled for compliance with consistent low carb diet.    DC PLAN: Patient was on basal bolus insulin at home; If blood sugars are stable, suggest DC on current insulin doses.  Follow Up: 4 weeks
Will continue current insulin regimen for now. Will continue monitoring FS and FU.  Patient counseled for compliance with consistent low carb diet and exercise as tolerated outpatient.    DC PLAN: Patient was on basal bolus insulin at home; If blood sugars are stable, suggest DC on current insulin doses.  Follow Up: 4 weeks
Will start Lantus 18 units at bed time.  Will start Admelog 8 units before each meal in addition to Admelog correction scale coverage.  Patient counseled for compliance with consistent low carb diet.
Expect blood sugars to continue trending down 2/2 steroid DC.  Will decrease Lantus to 28u at bedtime.  Will decrease Admelog to 10u before each meal and continue Admelog correction scale coverage. Will continue monitoring FS and adjust insulin dose accordingly.  Patient counseled for compliance with consistent low carb diet.

## 2022-11-14 NOTE — DISCHARGE NOTE NURSING/CASE MANAGEMENT/SOCIAL WORK - NSDCVIVACCINE_GEN_ALL_CORE_FT
COVID-19, mRNA, LNP-S, PF, 30 mcg/0.3 mL dose (Pfizer); 09-Dec-2021 11:24; Sobeida Kang (RN); Pf[employer]; [id number] (Exp. Date: 31-Dec-2021); IntraMuscular; Deltoid Left.; 0.3 milliLiter(s);   influenza, injectable, quadrivalent, preservative free; 09-Dec-2021 11:25; Sobeida Kang (SELVIN); [employer]; [id number] (Exp. Date: 30-Jun-2022); IntraMuscular; Deltoid Right.; 0.5 milliLiter(s); VIS (VIS Published: 06-Aug-2021, VIS Presented: 09-Dec-2021);

## 2022-11-14 NOTE — PROGRESS NOTE ADULT - NS ATTEND BILL GEN_ALL_CORE
Attending to bill

## 2022-11-14 NOTE — PROGRESS NOTE ADULT - PROBLEM SELECTOR PLAN 2
On medications,  no chest pain, stable, monitored and followed up by primary team/cardiology team

## 2022-11-14 NOTE — PROGRESS NOTE ADULT - SUBJECTIVE AND OBJECTIVE BOX
CC: f/u for Covid    Patient reports: he is afebrile, off RDV x 48 hours    REVIEW OF SYSTEMS:  All other review of systems negative (Comprehensive ROS)    Antimicrobials Day #  :off    Other Medications Reviewed    T(F): 97.8 (11-14-22 @ 11:01), Max: 99.1 (11-13-22 @ 21:45)  HR: 65 (11-14-22 @ 11:01)  BP: 109/58 (11-14-22 @ 11:01)  RR: 18 (11-14-22 @ 11:01)  SpO2: 98% (11-14-22 @ 11:01)  Wt(kg): --    PHYSICAL EXAM:  General: alert, no acute distress  Eyes:  anicteric, no conjunctival injection, no discharge  Oropharynx: no lesions or injection 	  Neck: supple, without adenopathy  Lungs: clear to auscultation  Heart: regular rate and rhythm; no murmur, rubs or gallops  Abdomen: soft, nondistended, nontender, without mass or organomegaly  Skin: no lesions  Extremities: no clubbing, cyanosis, or edema  Neurologic: alert, oriented, moves all extremities    LAB RESULTS:                        9.4    7.47  )-----------( 281      ( 13 Nov 2022 07:11 )             29.8     11-13    140  |  101  |  40<H>  ----------------------------<  127<H>  3.9   |  27  |  2.88<H>    Ca    8.1<L>      13 Nov 2022 07:11  Phos  3.3     11-13    TPro  5.5<L>  /  Alb  2.4<L>  /  TBili  0.2  /  DBili  x   /  AST  9<L>  /  ALT  11  /  AlkPhos  92  11-13    LIVER FUNCTIONS - ( 13 Nov 2022 07:11 )  Alb: 2.4 g/dL / Pro: 5.5 g/dL / ALK PHOS: 92 U/L / ALT: 11 U/L / AST: 9 U/L / GGT: x             MICROBIOLOGY:  RECENT CULTURES:      RADIOLOGY REVIEWED:

## 2022-11-14 NOTE — DISCHARGE NOTE NURSING/CASE MANAGEMENT/SOCIAL WORK - PATIENT PORTAL LINK FT
You can access the FollowMyHealth Patient Portal offered by [facility] by registering at the following website: [email or web address]. By joining [facility]’s FollowMyHealth portal, you will also be able to view your health information using other applications (apps) compatible with our system.

## 2022-11-14 NOTE — PROGRESS NOTE ADULT - PROBLEM SELECTOR PROBLEM 3
Chronic kidney disease, unspecified CKD stage

## 2022-11-14 NOTE — PROGRESS NOTE ADULT - SUBJECTIVE AND OBJECTIVE BOX
NEPHROLOGY-NSN [phone]        Patient seen and examined in bed.  He was the same         MEDICATIONS  (STANDING):  allopurinol 100 milliGRAM(s) Oral daily  amLODIPine   Tablet 5 milliGRAM(s) Oral daily  aspirin enteric coated 81 milliGRAM(s) Oral daily  atorvastatin 80 milliGRAM(s) Oral at bedtime  benzonatate 100 milliGRAM(s) Oral three times a day  carvedilol 25 milliGRAM(s) Oral every 12 hours  chlorhexidine 2% Cloths 1 Application(s) Topical daily  clopidogrel Tablet 75 milliGRAM(s) Oral daily  colchicine 0.3 milliGRAM(s) Oral every 48 hours  dextrose 5%. 1000 milliLiter(s) (50 mL/Hr) IV Continuous <Continuous>  dextrose 5%. 1000 milliLiter(s) (100 mL/Hr) IV Continuous <Continuous>  dextrose 50% Injectable 25 Gram(s) IV Push once  dextrose 50% Injectable 12.5 Gram(s) IV Push once  dextrose 50% Injectable 25 Gram(s) IV Push once  dextrose Oral Gel 15 Gram(s) Oral once  gabapentin 300 milliGRAM(s) Oral two times a day  glucagon  Injectable 1 milliGRAM(s) IntraMuscular once  heparin   Injectable 5000 Unit(s) SubCutaneous every 12 hours  insulin glargine Injectable (LANTUS) 12 Unit(s) SubCutaneous at bedtime  insulin lispro (ADMELOG) corrective regimen sliding scale   SubCutaneous three times a day before meals  insulin lispro (ADMELOG) corrective regimen sliding scale   SubCutaneous at bedtime  insulin lispro Injectable (ADMELOG) 6 Unit(s) SubCutaneous three times a day before meals  lidocaine/prilocaine Cream 1 Application(s) Topical <User Schedule>  tamsulosin 0.4 milliGRAM(s) Oral at bedtime  valsartan 320 milliGRAM(s) Oral daily      VITAL:  T(C): , Max: 37.3 (11-13-22 @ 21:45)  T(F): , Max: 99.1 (11-13-22 @ 21:45)  HR: 65 (11-14-22 @ 11:01)  BP: 109/58 (11-14-22 @ 11:01)  BP(mean): 101 (11-13-22 @ 17:10)  RR: 18 (11-14-22 @ 11:01)  SpO2: 98% (11-14-22 @ 11:01)  Wt(kg): --    I and O's:    11-13 @ 07:01  -  11-14 @ 07:00  --------------------------------------------------------  IN: 1380 mL / OUT: 1180 mL / NET: 200 mL    11-14 @ 07:01  -  11-14 @ 14:08  --------------------------------------------------------  IN: 840 mL / OUT: 0 mL / NET: 840 mL          PHYSICAL EXAM:    Constitutional: NAD  Neck:  No JVD  Respiratory: CTAB/L  Cardiovascular: S1 and S2  Gastrointestinal: BS+, soft, NT/ND  Extremities: No peripheral edema  Neurological: A/O x 3, no focal deficits  Psychiatric: Normal mood, normal affect  : No Jay  Skin: No rashes  Access: avf    LABS:                        9.4    7.47  )-----------( 281      ( 13 Nov 2022 07:11 )             29.8     11-13    140  |  101  |  40<H>  ----------------------------<  127<H>  3.9   |  27  |  2.88<H>    Ca    8.1<L>      13 Nov 2022 07:11  Phos  3.3     11-13    TPro  5.5<L>  /  Alb  2.4<L>  /  TBili  0.2  /  DBili  x   /  AST  9<L>  /  ALT  11  /  AlkPhos  92  11-13          Urine Studies:          RADIOLOGY & ADDITIONAL STUDIES:

## 2022-11-14 NOTE — CHART NOTE - NSCHARTNOTEFT_GEN_A_CORE
ADAM KOWALSKI    Notified by RN patient with critical lab result, patient COVID +. Patient seen and examined at bedside, NAD, O2 sat 98% on RA. Patient denies shortness of breath, does complain of cough and mild soar throat, no other complaints. Discussed with patient and family, patient will need to be on isolation for 10days.  Discussed with Dr Wood, monitor for now and ID consulted.      Vital Signs Last 24 Hrs  T(C): 36.8 (10 Nov 2022 05:04), Max: 37.6 (09 Nov 2022 21:12)  T(F): 98.2 (10 Nov 2022 05:04), Max: 99.7 (09 Nov 2022 21:12)  HR: 81 (10 Nov 2022 05:04) (78 - 81)  BP: 164/62 (10 Nov 2022 05:04) (145/82 - 164/62)  BP(mean): 96 (09 Nov 2022 11:12) (96 - 96)  RR: 18 (10 Nov 2022 05:04) (18 - 18)  SpO2: 98% (10 Nov 2022 05:04) (96% - 98%)    Parameters below as of 10 Nov 2022 05:04  Patient On (Oxygen Delivery Method): room air    SARS-CoV-2: Detected (10 Nov 2022 07:30)  COVID-19 PCR: NotDetec (06 Nov 2022 08:08)  COVID-19 PCR: NotDetec (02 Nov 2022 07:23)  COVID-19 PCR: NotDetec (27 Oct 2022 09:26)  SARS-CoV-2: NotDetec (22 Oct 2022 12:47)          Lizeth Foley
Contacted by  and informed [facility] bed available pending clearance for discharge.  Dr Wood contacted -  she has medically cleared patient for discharge to [facility] today (11/14/22
Removal of Femoral Sheath    Pulses in the left lower extremity are palpable. The patient was placed in the supine position. The insertion site was identified and the sutures were removed per protocol.  The 6 Tamazight femoral sheath was then removed. Direct pressure was applied for  20 minutes.     Monitoring of the left groin and both lower extremities including neuro-vascular checks and vital signs every 15 minutes x 4, then every 30 minutes x 2, then every 1 hour was ordered.    Complications: None    Comments: Continue ASA/Plavix for a minimum of 6 months.  Continue Atorvastatin, Valsartan and Coreg

## 2022-11-14 NOTE — PROGRESS NOTE ADULT - NS_MD_PANP_GEN_ALL_CORE

## 2022-11-14 NOTE — PROGRESS NOTE ADULT - ASSESSMENT
(1)ESRD- due for HD today, then subsequent HD Tuesday  (2)Lytes- controlled  (3)CV-BP acceptable  (4)COVID  +   (5)Triple vessel disease sp stent     RECOMMEND:  (1)HD in am   (2)SP Cardiac stent and DAPT for 6 months    (3)c/w  Valsartan 320mg po qd;  and Norvasc as ordered  (4)Dose new meds for GFR <10     DC to rehab when isolation is done     [facility]   [place]  [phone]

## 2022-11-14 NOTE — PROGRESS NOTE ADULT - PROBLEM SELECTOR PLAN 3
Monitor labs, Renal FU

## 2022-11-14 NOTE — PROGRESS NOTE ADULT - SUBJECTIVE AND OBJECTIVE BOX
Chief complaint  Patient is a 62y old  Male who presents with a chief complaint of fevers (14 Nov 2022 08:48)   Review of systems  Patient in bed, looks comfortable, no hypoglycemic episodes.    Labs and Fingersticks  CAPILLARY BLOOD GLUCOSE      POCT Blood Glucose.: 103 mg/dL (14 Nov 2022 07:59)  POCT Blood Glucose.: 297 mg/dL (13 Nov 2022 21:27)  POCT Blood Glucose.: 268 mg/dL (13 Nov 2022 16:32)      Anion Gap, Serum: 12 (11-13 @ 07:11)      Calcium, Total Serum: 8.1 *L* (11-13 @ 07:11)  Albumin, Serum: 2.4 *L* (11-13 @ 07:11)    Alanine Aminotransferase (ALT/SGPT): 11 (11-13 @ 07:11)  Alkaline Phosphatase, Serum: 92 (11-13 @ 07:11)  Aspartate Aminotransferase (AST/SGOT): 9 *L* (11-13 @ 07:11)        11-13    140  |  101  |  40<H>  ----------------------------<  127<H>  3.9   |  27  |  2.88<H>    Ca    8.1<L>      13 Nov 2022 07:11  Phos  3.3     11-13    TPro  5.5<L>  /  Alb  2.4<L>  /  TBili  0.2  /  DBili  x   /  AST  9<L>  /  ALT  11  /  AlkPhos  92  11-13                        9.4    7.47  )-----------( 281      ( 13 Nov 2022 07:11 )             29.8     Medications  MEDICATIONS  (STANDING):  allopurinol 100 milliGRAM(s) Oral daily  amLODIPine   Tablet 5 milliGRAM(s) Oral daily  aspirin enteric coated 81 milliGRAM(s) Oral daily  atorvastatin 80 milliGRAM(s) Oral at bedtime  benzonatate 100 milliGRAM(s) Oral three times a day  carvedilol 25 milliGRAM(s) Oral every 12 hours  chlorhexidine 2% Cloths 1 Application(s) Topical daily  clopidogrel Tablet 75 milliGRAM(s) Oral daily  colchicine 0.3 milliGRAM(s) Oral every 48 hours  dextrose 5%. 1000 milliLiter(s) (50 mL/Hr) IV Continuous <Continuous>  dextrose 5%. 1000 milliLiter(s) (100 mL/Hr) IV Continuous <Continuous>  dextrose 50% Injectable 25 Gram(s) IV Push once  dextrose 50% Injectable 12.5 Gram(s) IV Push once  dextrose 50% Injectable 25 Gram(s) IV Push once  dextrose Oral Gel 15 Gram(s) Oral once  gabapentin 300 milliGRAM(s) Oral two times a day  glucagon  Injectable 1 milliGRAM(s) IntraMuscular once  heparin   Injectable 5000 Unit(s) SubCutaneous every 12 hours  insulin glargine Injectable (LANTUS) 12 Unit(s) SubCutaneous at bedtime  insulin lispro (ADMELOG) corrective regimen sliding scale   SubCutaneous three times a day before meals  insulin lispro (ADMELOG) corrective regimen sliding scale   SubCutaneous at bedtime  insulin lispro Injectable (ADMELOG) 6 Unit(s) SubCutaneous three times a day before meals  lidocaine/prilocaine Cream 1 Application(s) Topical <User Schedule>  tamsulosin 0.4 milliGRAM(s) Oral at bedtime  valsartan 320 milliGRAM(s) Oral daily      Physical Exam  General: Patient comfortable in bed  Vital Signs Last 12 Hrs  T(F): 97.8 (11-14-22 @ 11:01), Max: 97.8 (11-14-22 @ 05:16)  HR: 65 (11-14-22 @ 11:01) (65 - 74)  BP: 109/58 (11-14-22 @ 11:01) (109/58 - 147/78)  BP(mean): --  RR: 18 (11-14-22 @ 11:01) (18 - 18)  SpO2: 98% (11-14-22 @ 11:01) (97% - 98%)  Neck: No palpable thyroid nodules.  CVS: S1S2, No murmurs  Respiratory: No wheezing, no crepitations  GI: Abdomen soft, bowel sounds positive  Musculoskeletal:  edema lower extremities.   Skin: No skin rashes, no ecchymosis    Diagnostics    C-Peptide, Serum: Routine (11-03 @ 10:45)           Chief complaint  Patient is a 62y old  Male who presents with a chief complaint of fevers (14 Nov 2022 08:48)   Review of systems  Patient in bed, looks comfortable, no hypoglycemic episodes.    Labs and Fingersticks  CAPILLARY BLOOD GLUCOSE      POCT Blood Glucose.: 103 mg/dL (14 Nov 2022 07:59)  POCT Blood Glucose.: 297 mg/dL (13 Nov 2022 21:27)  POCT Blood Glucose.: 268 mg/dL (13 Nov 2022 16:32)      Anion Gap, Serum: 12 (11-13 @ 07:11)      Calcium, Total Serum: 8.1 *L* (11-13 @ 07:11)  Albumin, Serum: 2.4 *L* (11-13 @ 07:11)    Alanine Aminotransferase (ALT/SGPT): 11 (11-13 @ 07:11)  Alkaline Phosphatase, Serum: 92 (11-13 @ 07:11)  Aspartate Aminotransferase (AST/SGOT): 9 *L* (11-13 @ 07:11)        11-13    140  |  101  |  40<H>  ----------------------------<  127<H>  3.9   |  27  |  2.88<H>    Ca    8.1<L>      13 Nov 2022 07:11  Phos  3.3     11-13    TPro  5.5<L>  /  Alb  2.4<L>  /  TBili  0.2  /  DBili  x   /  AST  9<L>  /  ALT  11  /  AlkPhos  92  11-13                        9.4    7.47  )-----------( 281      ( 13 Nov 2022 07:11 )             29.8     Medications  MEDICATIONS  (STANDING):  allopurinol 100 milliGRAM(s) Oral daily  amLODIPine   Tablet 5 milliGRAM(s) Oral daily  aspirin enteric coated 81 milliGRAM(s) Oral daily  atorvastatin 80 milliGRAM(s) Oral at bedtime  benzonatate 100 milliGRAM(s) Oral three times a day  carvedilol 25 milliGRAM(s) Oral every 12 hours  chlorhexidine 2% Cloths 1 Application(s) Topical daily  clopidogrel Tablet 75 milliGRAM(s) Oral daily  colchicine 0.3 milliGRAM(s) Oral every 48 hours  dextrose 5%. 1000 milliLiter(s) (50 mL/Hr) IV Continuous <Continuous>  dextrose 5%. 1000 milliLiter(s) (100 mL/Hr) IV Continuous <Continuous>  dextrose 50% Injectable 25 Gram(s) IV Push once  dextrose 50% Injectable 12.5 Gram(s) IV Push once  dextrose 50% Injectable 25 Gram(s) IV Push once  dextrose Oral Gel 15 Gram(s) Oral once  gabapentin 300 milliGRAM(s) Oral two times a day  glucagon  Injectable 1 milliGRAM(s) IntraMuscular once  heparin   Injectable 5000 Unit(s) SubCutaneous every 12 hours  insulin glargine Injectable (LANTUS) 12 Unit(s) SubCutaneous at bedtime  insulin lispro (ADMELOG) corrective regimen sliding scale   SubCutaneous three times a day before meals  insulin lispro (ADMELOG) corrective regimen sliding scale   SubCutaneous at bedtime  insulin lispro Injectable (ADMELOG) 6 Unit(s) SubCutaneous three times a day before meals  lidocaine/prilocaine Cream 1 Application(s) Topical <User Schedule>  tamsulosin 0.4 milliGRAM(s) Oral at bedtime  valsartan 320 milliGRAM(s) Oral daily      Physical Exam  General: Patient comfortable in bed  Vital Signs Last 12 Hrs  T(F): 97.8 (11-14-22 @ 11:01), Max: 97.8 (11-14-22 @ 05:16)  HR: 65 (11-14-22 @ 11:01) (65 - 74)  BP: 109/58 (11-14-22 @ 11:01) (109/58 - 147/78)  BP(mean): --  RR: 18 (11-14-22 @ 11:01) (18 - 18)  SpO2: 98% (11-14-22 @ 11:01) (97% - 98%)  Neck: No palpable thyroid nodules.  CVS: S1S2, No murmurs  Respiratory: No wheezing, no crepitations  GI: Abdomen soft, bowel sounds positive  Musculoskeletal:  edema lower extremities.   Skin: No skin rashes, no ecchymosis    Diagnostics    C-Peptide, Serum: Routine (11-03 @ 10:45)

## 2022-11-14 NOTE — DISCHARGE NOTE NURSING/CASE MANAGEMENT/SOCIAL WORK - NSDCPEFALRISK_GEN_ALL_CORE
For information on Fall & Injury Prevention, visit: [email or web address] OR  [email or web address] OR  https://www.cdc.gov/steadi/patient.html

## 2022-11-14 NOTE — PROGRESS NOTE ADULT - ASSESSMENT
Assessment  DMT2: 62y Male with DM T2 with hyperglycemia admitted with weakness, was on insulin at home, now on basal bolus insulin, decreased dose yesterday, blood sugars in acceptable range this AM and running high postprandially, no hypoglycemias. Patient eating meals, well-appearing and eager for DC, received last steroid dose yesterday.  CAD: on medications, monitored, asymptomatic.  HTN: On antihypertensive medications, monitored, asymptomatic.  CKD:  labs, chart reviewed.          Rubén Escoto MD  Cell:  [phone]  Office:     Rachel SHORT  Cell:   Office:                Assessment  DMT2: 62y Male with DM T2 with hyperglycemia admitted with weakness, was on insulin at home, now on basal bolus insulin, decreased dose yesterday, blood sugars in acceptable range this AM and running high postprandially,  no hypoglycemias. Patient eating meals, well-appearing and eager for DC, received last steroid dose yesterday.  CAD: on medications, monitored, asymptomatic.  HTN: On antihypertensive medications, monitored, asymptomatic.  CKD:  labs, chart reviewed.          Rubén Escoto MD  Cell:  [phone]  Office:     Rachel SHORT  Cell:   Office:

## 2022-11-14 NOTE — PROGRESS NOTE ADULT - PROBLEM SELECTOR PROBLEM 1
DM (diabetes mellitus)

## 2022-11-14 NOTE — PROGRESS NOTE ADULT - PROBLEM SELECTOR PROBLEM 2
CAD (coronary artery disease)

## 2022-11-14 NOTE — PROGRESS NOTE ADULT - NS ATTEND AMEND GEN_ALL_CORE FT
No acute events reported overnight.  The patient is status post drug-eluting stents to LAD via the left femoral artery.  Denies any cardiopulmonary complaints.  No Cardioplen complaints earlier this morning at rest or upon exertion.  Telemetry demonstrates sinus rhythm with rates in the 70s to 80s.  Continue aspirin 81 mg daily and clopidogrel 75 mg daily.  Continue atorvastatin.  EF 30%.  Patient is on valsartan.  Uptitrate as tolerated.  Patient was told to avoid all NSAIDs.  May take Tylenol for discomfort.    All questions and concerns of the patient were addressed.    Time-based billing (NON-critical care).     25 minutes spent on total encounter; more than 50% of the visit was spent counseling and / or coordinating care by the attending physician.  The necessity of the time spent during the encounter on this date of service was due to:     education, assessment and coordination of care.
No acute events reported overnight.  He is status post PCI of his right coronary artery and LAD.  Denies any cardiopulmonary complaints.  No Cardioplen complaints at rest or upon exertion.  Telemetry demonstrates sinus rhythm with rates in the 70s to 80s with couplets.  Agree with physical examination as noted above.  Right and left groin sites are soft with no ecchymosis no hematoma/bruit.  Continue aspirin 81 mg daily and clopidogrel 75 mg daily.  Plan for DAPT therapy for 6 months as per interventional cardiologist.  Continue atorvastatin.  EF 39%.  Patient is on valsartan.  He is currently on HD.  Uptitrate as tolerated.  Patient was told to avoid all NSAIDs.  May take Tylenol for discomfort.    All questions and concerns of the patient were addressed.    Time-based billing (NON-critical care).     25 minutes spent on total encounter; more than 50% of the visit was spent counseling and / or coordinating care by the attending physician.  The necessity of the time spent during the encounter on this date of service was due to:     education, assessment and coordination of care.
Patient seen and examined today at bedside. Chart, labs, vitals, radiology reviewed. Above H&P reviewed and edited where appropriate. Agree with history and physical exam. Agree with assessment and plan. I reviewed the overnight course of events and discussed the care with the patient and their family  35 minutes spent on total encounter of which more than fifty percent of the encounter was spent counseling and/or coordinating care by the attending physician.

## 2022-12-12 ENCOUNTER — INPATIENT (INPATIENT)
Facility: HOSPITAL | Age: 62
LOS: 18 days | Discharge: NURSING, REHAB OR LONG-TERM CARE | DRG: 85 | End: 2022-12-31
Attending: INTERNAL MEDICINE | Admitting: INTERNAL MEDICINE
Payer: COMMERCIAL

## 2022-12-12 VITALS
SYSTOLIC BLOOD PRESSURE: 148 MMHG | HEART RATE: 94 BPM | OXYGEN SATURATION: 96 % | RESPIRATION RATE: 18 BRPM | TEMPERATURE: 99 F | WEIGHT: 179.9 LBS | HEIGHT: 67 IN | DIASTOLIC BLOOD PRESSURE: 74 MMHG

## 2022-12-12 DIAGNOSIS — Z98.89 OTHER SPECIFIED POSTPROCEDURAL STATES: Chronic | ICD-10-CM

## 2022-12-12 DIAGNOSIS — I50.9 HEART FAILURE, UNSPECIFIED: Chronic | ICD-10-CM

## 2022-12-12 DIAGNOSIS — Z95.5 PRESENCE OF CORONARY ANGIOPLASTY IMPLANT AND GRAFT: Chronic | ICD-10-CM

## 2022-12-12 DIAGNOSIS — E11.319 TYPE 2 DIABETES MELLITUS WITH UNSPECIFIED DIABETIC RETINOPATHY WITHOUT MACULAR EDEMA: Chronic | ICD-10-CM

## 2022-12-12 DIAGNOSIS — N18.6 END STAGE RENAL DISEASE: ICD-10-CM

## 2022-12-12 LAB
ALBUMIN SERPL ELPH-MCNC: 3.6 G/DL — SIGNIFICANT CHANGE UP (ref 3.3–5)
ALP SERPL-CCNC: 105 U/L — SIGNIFICANT CHANGE UP (ref 40–120)
ALT FLD-CCNC: 7 U/L — LOW (ref 10–45)
ANION GAP SERPL CALC-SCNC: 16 MMOL/L — SIGNIFICANT CHANGE UP (ref 5–17)
APTT BLD: 33.3 SEC — SIGNIFICANT CHANGE UP (ref 27.5–35.5)
AST SERPL-CCNC: <5 U/L — LOW (ref 10–40)
BASOPHILS # BLD AUTO: 0.04 K/UL — SIGNIFICANT CHANGE UP (ref 0–0.2)
BASOPHILS NFR BLD AUTO: 0.3 % — SIGNIFICANT CHANGE UP (ref 0–2)
BILIRUB SERPL-MCNC: 0.6 MG/DL — SIGNIFICANT CHANGE UP (ref 0.2–1.2)
BUN SERPL-MCNC: 37 MG/DL — HIGH (ref 7–23)
CALCIUM SERPL-MCNC: 8.7 MG/DL — SIGNIFICANT CHANGE UP (ref 8.4–10.5)
CHLORIDE SERPL-SCNC: 99 MMOL/L — SIGNIFICANT CHANGE UP (ref 96–108)
CO2 SERPL-SCNC: 26 MMOL/L — SIGNIFICANT CHANGE UP (ref 22–31)
CREAT SERPL-MCNC: 3.71 MG/DL — HIGH (ref 0.5–1.3)
EGFR: 18 ML/MIN/1.73M2 — LOW
EOSINOPHIL # BLD AUTO: 0.04 K/UL — SIGNIFICANT CHANGE UP (ref 0–0.5)
EOSINOPHIL NFR BLD AUTO: 0.3 % — SIGNIFICANT CHANGE UP (ref 0–6)
GLUCOSE BLDC GLUCOMTR-MCNC: 246 MG/DL — HIGH (ref 70–99)
GLUCOSE SERPL-MCNC: 275 MG/DL — HIGH (ref 70–99)
HCT VFR BLD CALC: 33.6 % — LOW (ref 39–50)
HGB BLD-MCNC: 10.4 G/DL — LOW (ref 13–17)
IMM GRANULOCYTES NFR BLD AUTO: 0.5 % — SIGNIFICANT CHANGE UP (ref 0–0.9)
INR BLD: 1.14 RATIO — SIGNIFICANT CHANGE UP (ref 0.88–1.16)
LYMPHOCYTES # BLD AUTO: 18.1 % — SIGNIFICANT CHANGE UP (ref 13–44)
LYMPHOCYTES # BLD AUTO: 2.07 K/UL — SIGNIFICANT CHANGE UP (ref 1–3.3)
MCHC RBC-ENTMCNC: 27.2 PG — SIGNIFICANT CHANGE UP (ref 27–34)
MCHC RBC-ENTMCNC: 31 GM/DL — LOW (ref 32–36)
MCV RBC AUTO: 87.7 FL — SIGNIFICANT CHANGE UP (ref 80–100)
MONOCYTES # BLD AUTO: 1.44 K/UL — HIGH (ref 0–0.9)
MONOCYTES NFR BLD AUTO: 12.6 % — SIGNIFICANT CHANGE UP (ref 2–14)
NEUTROPHILS # BLD AUTO: 7.81 K/UL — HIGH (ref 1.8–7.4)
NEUTROPHILS NFR BLD AUTO: 68.2 % — SIGNIFICANT CHANGE UP (ref 43–77)
NRBC # BLD: 0 /100 WBCS — SIGNIFICANT CHANGE UP (ref 0–0)
PA ADP PRP-ACNC: 209 PRU — SIGNIFICANT CHANGE UP (ref 194–417)
PLATELET # BLD AUTO: 210 K/UL — SIGNIFICANT CHANGE UP (ref 150–400)
PLATELET RESPONSE ASPIRIN RESULT: 396 ARU — SIGNIFICANT CHANGE UP (ref 350–700)
POTASSIUM SERPL-MCNC: 3.3 MMOL/L — LOW (ref 3.5–5.3)
POTASSIUM SERPL-SCNC: 3.3 MMOL/L — LOW (ref 3.5–5.3)
PROT SERPL-MCNC: 6.9 G/DL — SIGNIFICANT CHANGE UP (ref 6–8.3)
PROTHROM AB SERPL-ACNC: 13.1 SEC — SIGNIFICANT CHANGE UP (ref 10.5–13.4)
RBC # BLD: 3.83 M/UL — LOW (ref 4.2–5.8)
RBC # FLD: 15.6 % — HIGH (ref 10.3–14.5)
SARS-COV-2 RNA SPEC QL NAA+PROBE: DETECTED
SARS-COV-2 RNA SPEC QL NAA+PROBE: SIGNIFICANT CHANGE UP
SODIUM SERPL-SCNC: 141 MMOL/L — SIGNIFICANT CHANGE UP (ref 135–145)
WBC # BLD: 11.46 K/UL — HIGH (ref 3.8–10.5)
WBC # FLD AUTO: 11.46 K/UL — HIGH (ref 3.8–10.5)

## 2022-12-12 PROCEDURE — 73562 X-RAY EXAM OF KNEE 3: CPT | Mod: 26,50

## 2022-12-12 PROCEDURE — 99291 CRITICAL CARE FIRST HOUR: CPT

## 2022-12-12 PROCEDURE — 99285 EMERGENCY DEPT VISIT HI MDM: CPT

## 2022-12-12 PROCEDURE — 72125 CT NECK SPINE W/O DYE: CPT | Mod: 26

## 2022-12-12 PROCEDURE — 73700 CT LOWER EXTREMITY W/O DYE: CPT | Mod: 26,RT

## 2022-12-12 PROCEDURE — 70450 CT HEAD/BRAIN W/O DYE: CPT | Mod: 26

## 2022-12-12 PROCEDURE — 76377 3D RENDER W/INTRP POSTPROCES: CPT | Mod: 26,59

## 2022-12-12 PROCEDURE — 70450 CT HEAD/BRAIN W/O DYE: CPT | Mod: 26,77

## 2022-12-12 PROCEDURE — 71250 CT THORAX DX C-: CPT | Mod: 26

## 2022-12-12 PROCEDURE — 74176 CT ABD & PELVIS W/O CONTRAST: CPT | Mod: 26

## 2022-12-12 PROCEDURE — 71045 X-RAY EXAM CHEST 1 VIEW: CPT | Mod: 26

## 2022-12-12 PROCEDURE — 72128 CT CHEST SPINE W/O DYE: CPT | Mod: 26

## 2022-12-12 PROCEDURE — 72170 X-RAY EXAM OF PELVIS: CPT | Mod: 26

## 2022-12-12 PROCEDURE — 72131 CT LUMBAR SPINE W/O DYE: CPT | Mod: 26

## 2022-12-12 RX ORDER — INSULIN GLARGINE 100 [IU]/ML
5 INJECTION, SOLUTION SUBCUTANEOUS AT BEDTIME
Refills: 0 | Status: DISCONTINUED | OUTPATIENT
Start: 2022-12-12 | End: 2022-12-13

## 2022-12-12 RX ORDER — SODIUM CHLORIDE 9 MG/ML
1000 INJECTION, SOLUTION INTRAVENOUS
Refills: 0 | Status: DISCONTINUED | OUTPATIENT
Start: 2022-12-12 | End: 2022-12-12

## 2022-12-12 RX ORDER — GABAPENTIN 400 MG/1
300 CAPSULE ORAL
Refills: 0 | Status: DISCONTINUED | OUTPATIENT
Start: 2022-12-12 | End: 2022-12-12

## 2022-12-12 RX ORDER — COLCHICINE 0.6 MG
0.6 TABLET ORAL
Refills: 0 | Status: DISCONTINUED | OUTPATIENT
Start: 2022-12-12 | End: 2022-12-12

## 2022-12-12 RX ORDER — LEVETIRACETAM 250 MG/1
500 TABLET, FILM COATED ORAL
Refills: 0 | Status: DISCONTINUED | OUTPATIENT
Start: 2022-12-12 | End: 2022-12-13

## 2022-12-12 RX ORDER — ALLOPURINOL 300 MG
100 TABLET ORAL DAILY
Refills: 0 | Status: DISCONTINUED | OUTPATIENT
Start: 2022-12-12 | End: 2022-12-31

## 2022-12-12 RX ORDER — INSULIN LISPRO 100/ML
4 VIAL (ML) SUBCUTANEOUS ONCE
Refills: 0 | Status: COMPLETED | OUTPATIENT
Start: 2022-12-12 | End: 2022-12-12

## 2022-12-12 RX ORDER — SODIUM CHLORIDE 9 MG/ML
1000 INJECTION INTRAMUSCULAR; INTRAVENOUS; SUBCUTANEOUS ONCE
Refills: 0 | Status: COMPLETED | OUTPATIENT
Start: 2022-12-12 | End: 2022-12-12

## 2022-12-12 RX ORDER — AMLODIPINE BESYLATE 2.5 MG/1
5 TABLET ORAL DAILY
Refills: 0 | Status: DISCONTINUED | OUTPATIENT
Start: 2022-12-12 | End: 2022-12-21

## 2022-12-12 RX ORDER — GLUCAGON INJECTION, SOLUTION 0.5 MG/.1ML
1 INJECTION, SOLUTION SUBCUTANEOUS ONCE
Refills: 0 | Status: DISCONTINUED | OUTPATIENT
Start: 2022-12-12 | End: 2022-12-14

## 2022-12-12 RX ORDER — DEXTROSE 50 % IN WATER 50 %
25 SYRINGE (ML) INTRAVENOUS ONCE
Refills: 0 | Status: DISCONTINUED | OUTPATIENT
Start: 2022-12-12 | End: 2022-12-12

## 2022-12-12 RX ORDER — DEXTROSE 50 % IN WATER 50 %
15 SYRINGE (ML) INTRAVENOUS ONCE
Refills: 0 | Status: DISCONTINUED | OUTPATIENT
Start: 2022-12-12 | End: 2022-12-12

## 2022-12-12 RX ORDER — ATORVASTATIN CALCIUM 80 MG/1
80 TABLET, FILM COATED ORAL AT BEDTIME
Refills: 0 | Status: DISCONTINUED | OUTPATIENT
Start: 2022-12-12 | End: 2022-12-31

## 2022-12-12 RX ORDER — DEXTROSE 50 % IN WATER 50 %
12.5 SYRINGE (ML) INTRAVENOUS ONCE
Refills: 0 | Status: DISCONTINUED | OUTPATIENT
Start: 2022-12-12 | End: 2022-12-14

## 2022-12-12 RX ORDER — DEXTROSE 50 % IN WATER 50 %
25 SYRINGE (ML) INTRAVENOUS ONCE
Refills: 0 | Status: DISCONTINUED | OUTPATIENT
Start: 2022-12-12 | End: 2022-12-14

## 2022-12-12 RX ORDER — INSULIN GLARGINE 100 [IU]/ML
8 INJECTION, SOLUTION SUBCUTANEOUS AT BEDTIME
Refills: 0 | Status: DISCONTINUED | OUTPATIENT
Start: 2022-12-12 | End: 2022-12-12

## 2022-12-12 RX ORDER — INSULIN LISPRO 100/ML
2 VIAL (ML) SUBCUTANEOUS
Refills: 0 | Status: DISCONTINUED | OUTPATIENT
Start: 2022-12-12 | End: 2022-12-12

## 2022-12-12 RX ORDER — DESMOPRESSIN ACETATE 0.1 MG/1
24 TABLET ORAL ONCE
Refills: 0 | Status: DISCONTINUED | OUTPATIENT
Start: 2022-12-12 | End: 2022-12-12

## 2022-12-12 RX ORDER — DULOXETINE HYDROCHLORIDE 30 MG/1
30 CAPSULE, DELAYED RELEASE ORAL DAILY
Refills: 0 | Status: DISCONTINUED | OUTPATIENT
Start: 2022-12-12 | End: 2022-12-12

## 2022-12-12 RX ORDER — VALSARTAN 80 MG/1
320 TABLET ORAL DAILY
Refills: 0 | Status: DISCONTINUED | OUTPATIENT
Start: 2022-12-12 | End: 2022-12-16

## 2022-12-12 RX ORDER — LIDOCAINE AND PRILOCAINE CREAM 25; 25 MG/G; MG/G
1 CREAM TOPICAL
Refills: 0 | Status: DISCONTINUED | OUTPATIENT
Start: 2022-12-12 | End: 2022-12-31

## 2022-12-12 RX ORDER — INSULIN LISPRO 100/ML
VIAL (ML) SUBCUTANEOUS
Refills: 0 | Status: DISCONTINUED | OUTPATIENT
Start: 2022-12-12 | End: 2022-12-14

## 2022-12-12 RX ORDER — INSULIN LISPRO 100/ML
VIAL (ML) SUBCUTANEOUS
Refills: 0 | Status: DISCONTINUED | OUTPATIENT
Start: 2022-12-12 | End: 2022-12-12

## 2022-12-12 RX ORDER — LEVETIRACETAM 250 MG/1
500 TABLET, FILM COATED ORAL ONCE
Refills: 0 | Status: COMPLETED | OUTPATIENT
Start: 2022-12-12 | End: 2022-12-12

## 2022-12-12 RX ORDER — TAMSULOSIN HYDROCHLORIDE 0.4 MG/1
0.4 CAPSULE ORAL AT BEDTIME
Refills: 0 | Status: DISCONTINUED | OUTPATIENT
Start: 2022-12-12 | End: 2022-12-31

## 2022-12-12 RX ORDER — ACETAMINOPHEN 500 MG
650 TABLET ORAL ONCE
Refills: 0 | Status: COMPLETED | OUTPATIENT
Start: 2022-12-12 | End: 2022-12-12

## 2022-12-12 RX ORDER — INSULIN GLARGINE 100 [IU]/ML
6 INJECTION, SOLUTION SUBCUTANEOUS AT BEDTIME
Refills: 0 | Status: DISCONTINUED | OUTPATIENT
Start: 2022-12-12 | End: 2022-12-12

## 2022-12-12 RX ORDER — CARVEDILOL PHOSPHATE 80 MG/1
25 CAPSULE, EXTENDED RELEASE ORAL EVERY 12 HOURS
Refills: 0 | Status: DISCONTINUED | OUTPATIENT
Start: 2022-12-12 | End: 2022-12-12

## 2022-12-12 RX ADMIN — TAMSULOSIN HYDROCHLORIDE 0.4 MILLIGRAM(S): 0.4 CAPSULE ORAL at 23:02

## 2022-12-12 RX ADMIN — ATORVASTATIN CALCIUM 80 MILLIGRAM(S): 80 TABLET, FILM COATED ORAL at 23:01

## 2022-12-12 RX ADMIN — Medication 650 MILLIGRAM(S): at 17:40

## 2022-12-12 RX ADMIN — LEVETIRACETAM 400 MILLIGRAM(S): 250 TABLET, FILM COATED ORAL at 19:47

## 2022-12-12 RX ADMIN — AMLODIPINE BESYLATE 5 MILLIGRAM(S): 2.5 TABLET ORAL at 23:43

## 2022-12-12 RX ADMIN — INSULIN GLARGINE 5 UNIT(S): 100 INJECTION, SOLUTION SUBCUTANEOUS at 23:04

## 2022-12-12 RX ADMIN — Medication 100 MILLIGRAM(S): at 23:43

## 2022-12-12 RX ADMIN — SODIUM CHLORIDE 1000 MILLILITER(S): 9 INJECTION INTRAMUSCULAR; INTRAVENOUS; SUBCUTANEOUS at 19:47

## 2022-12-12 RX ADMIN — VALSARTAN 320 MILLIGRAM(S): 80 TABLET ORAL at 23:43

## 2022-12-12 RX ADMIN — Medication 4: at 23:07
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
## 2022-12-12 NOTE — CONSULT NOTE ADULT - SUBJECTIVE AND OBJECTIVE BOX
NEPHROLOGY - NSN    Patient seen and examined.    HPI:  Mr. Owens is a 62 year-old man with history of multiple medical issues including hypertension, diabetes mellitus, coronary artery diease, congestive heart failure with reduced EF, gout, and ESRD presents from PCP office for fall and is on a/C. He was recently dc from [place] and now at [facility].  This HD center is only open [facility] and there is no other shifts           PAST MEDICAL & SURGICAL HISTORY:  Diabetes Mellitus Type II, Uncontrolled      Dyslipidemia      s/p Angioplasty with Stent      HTN (hypertension)      Gout      Diabetic retinopathy      GERD (gastroesophageal reflux disease)      Nephrolithiasis      Vitamin D deficiency      Hepatitis      CKD (chronic kidney disease)      Ischemic cardiomyopathy      ASHD (arteriosclerotic heart disease)      Pulmonary hypertension      Myocardial infarction      CAD (coronary artery disease)      BPH (benign prostatic hyperplasia)      Retinal Hemorrhage      S/P coronary artery stent placement  2010 TRICIA to Diag ; 11/18/2010  LAD; 2/4/11 ATOM liberte Diag; 5/15/2017  TRICIA to 1st diag; 6/7/17 PCI with laser and high pressure balloon      History of eye surgery  left eye      H/O lithotripsy      Diabetes with retinopathy  S/P PPV/PRP/GAS  OD 2/22/17 for viterous hemorrhage      CHF with cardiomyopathy  Insertion of Cardiomems monitor      Stented coronary artery          MEDICATIONS  (STANDING):  acetaminophen     Tablet .. 650 milliGRAM(s) Oral once      Allergies    No Known Drug Allergies  Seafood (Unknown)  shellfish (Unknown)    Intolerances        SOCIAL HISTORY:  Denies alcohol abuse, drug abuse or tobacco usage.     FAMILY HISTORY:  Family history of cardiac disorder in father (Father)        VITALS:  T(C): 37.1 (12-12-22 @ 13:53), Max: 37.1 (12-12-22 @ 13:53)  HR: 88 (12-12-22 @ 15:59) (88 - 94)  BP: 144/78 (12-12-22 @ 15:59) (144/78 - 148/74)  RR: 18 (12-12-22 @ 13:53) (18 - 18)  SpO2: 97% (12-12-22 @ 15:59) (96% - 97%)    REVIEW OF SYSTEMS:    +  fatigue or weakness. All other pertinent systems are reviewed and are negative.    PHYSICAL EXAM:  Constitutional: NAD  HEENT: EOMI  Neck:  No JVD, supple   Respiratory: CTA B/L  Cardiovascular: S1 and S2, RRR  Gastrointestinal: + BS, soft, NT, ND  Extremities: No peripheral edema, + peripheral pulses  Neurological:  CN2-12 intact  Psychiatric: Normal mood, normal affect  : No Jay  Skin: No rashes, C/D/I  Access: avf     I and O's:    Height (cm): 170.2 (12-12 @ 13:53)  Weight (kg): 81.6 (12-12 @ 13:53)  BMI (kg/m2): 28.2 (12-12 @ 13:53)  BSA (m2): 1.93 (12-12 @ 13:53)    LABS:            URINE:      RADIOLOGY & ADDITIONAL STUDIES:    < from: Xray Knee 3 Views, Bilateral (12.12.22 @ 14:58) >    ACC: [id number] EXAM:  XR KNEE AP LAT OBL 3 VIEWS BI                          PROCEDURE DATE:  12/12/2022          INTERPRETATION:  HISTORY: pain s/p fall    TECHNIQUE: 3 views of the bilateral knees.    COMPARISON: None    IMPRESSION:    No acute displaced fracture or dislocation. No significant knee joint   effusions.    Atherosclerotic calcifications are noted.    --- End of Report ---            MOI CESAR M.D., ATTENDING RADIOLOGIST  This document has been elec    < end of copied text >

## 2022-12-12 NOTE — H&P ADULT - NSHPPHYSICALEXAM_GEN_ALL_CORE
PHYSICAL EXAMINATION:  Vital Signs Last 24 Hrs  T(C): 37.1 (12 Dec 2022 13:53), Max: 37.1 (12 Dec 2022 13:53)  T(F): 98.8 (12 Dec 2022 13:53), Max: 98.8 (12 Dec 2022 13:53)  HR: 88 (12 Dec 2022 15:59) (88 - 94)  BP: 144/78 (12 Dec 2022 15:59) (144/78 - 148/74)  BP(mean): --  RR: 18 (12 Dec 2022 13:53) (18 - 18)  SpO2: 97% (12 Dec 2022 15:59) (96% - 97%)    Parameters below as of 12 Dec 2022 13:53  Patient On (Oxygen Delivery Method): room air      CAPILLARY BLOOD GLUCOSE            GENERAL: NAD, well-groomed,  HEAD:  atraumatic, normocephalic  EYES: sclera anicteric  ENMT: mucous membranes moist  NECK: supple, No JVD  CHEST/LUNG: clear to auscultation bilaterally;    no      rales   ,   no rhonchi,   HEART: normal S1, S2  ABDOMEN: BS+, soft, ND, NT   EXTREMITIES:    no    edema    b/l LEs  NEURO: awake, ,     moves all extremities  SKIN: no     rash PHYSICAL EXAMINATION:  Vital Signs Last 24 Hrs  T(C): 37.1 (12 Dec 2022 13:53), Max: 37.1 (12 Dec 2022 13:53)  T(F): 98.8 (12 Dec 2022 13:53), Max: 98.8 (12 Dec 2022 13:53)  HR: 88 (12 Dec 2022 15:59) (88 - 94)  BP: 144/78 (12 Dec 2022 15:59) (144/78 - 148/74)  BP(mean): --  RR: 18 (12 Dec 2022 13:53) (18 - 18)  SpO2: 97% (12 Dec 2022 15:59) (96% - 97%)    Parameters below as of 12 Dec 2022 13:53  Patient On (Oxygen Delivery Method): room air      CAPILLARY BLOOD GLUCOSE            GENERAL: NAD, well-groomed,  HEAD:  atraumatic, normocephalic  EYES: sclera anicteric  ENMT: mucous membranes moist  NECK: supple, No JVD  CHEST/LUNG: clear to auscultation bilaterally;    no      rales   ,   no rhonchi,   HEART: normal S1, S2  ABDOMEN: BS+, soft, ND, NT   EXTREMITIES:    no    edema    b/l LEs  NEURO: awake, ,     SKIN: no     rash PHYSICAL EXAMINATION:  Vital Signs Last 24 Hrs  T(C): 37.1 (12 Dec 2022 13:53), Max: 37.1 (12 Dec 2022 13:53)  T(F): 98.8 (12 Dec 2022 13:53), Max: 98.8 (12 Dec 2022 13:53)  HR: 88 (12 Dec 2022 15:59) (88 - 94)  BP: 144/78 (12 Dec 2022 15:59) (144/78 - 148/74)  BP(mean): --  RR: 18 (12 Dec 2022 13:53) (18 - 18)  SpO2: 97% (12 Dec 2022 15:59) (96% - 97%)    Parameters below as of 12 Dec 2022 13:53  Patient On (Oxygen Delivery Method): room air      CAPILLARY BLOOD GLUCOSE            GENERAL: NAD, well-groomed,  HEAD:  atraumatic, normocephalic  EYES: sclera anicteric  ENMT: mucous membranes moist  NECK: supple, No JVD  CHEST/LUNG: clear to auscultation bilaterally;    no      rales   ,   no rhonchi,   HEART: normal S1, S2  ABDOMEN: BS+, soft, ND, NT   EXTREMITIES:      edema    b/l LEs  NEURO: awake, ,     able  to  raise   b/l  arms   right leg,  unabke  to raise.  able to life lwg off bed,  to chapin e degree    SKIN: no     rash Exam: AO3, PERRL, EOMI, RUE 5/5 except tri 4/5 (pain limited gout), LUE 5/5, RLE 2/5 proximal (pain limited at knee, likely gout flair), RLE distal 5/5, LLE 5/5 except KE 4+/5 (pain limited at knee 2/2 local injury), no neck/spine pain, SILT.

## 2022-12-12 NOTE — ED PROVIDER NOTE - PROGRESS NOTE DETAILS
Sonia Harris M.D. (Resident Physician): Paged pt's nephrologist Dr. Montaño. Pt has missed his dialysis appt today. Will see if Dr. Montaño wants him to get dialysis in the hospital or schedule him for tmrw. questionable avulsion fx off prox tib will ct to further evbal pt unable to  ambualte wo walke r pt missied dialysis dw dr sayed ali - unable to get outpt dialysis tommorrow admnit for dialysis - to dr mays Patient was admitted for probable fracture be versus contusion requiring dialysis and was found to have epidural hematoma in the meanwhile.  Patient notably is on antiplatelet agent Plavix and aspirin.  Discussed with neurosurgery.  Will see patient promptly.  Neuro exam is unremarkable at this time. – Ad Anguiano Sonia Harris M.D. (Resident Physician): Radiologist called. Pt has a right epidural hematoma and possible right subdural hematoma. Spoke with neurosurgery. Will give ddavp. Will repeat CT head in 4 hours or earlier if exam declines. Systolics 100-160. Spoke with Dr. Greenberg. Will cancel admission and call trauma. dw trauma - will see pt promptly. given duration since inj and neuro stable will not call trauma activation. -Ad Anguiano MD-

## 2022-12-12 NOTE — CONSULT NOTE ADULT - ASSESSMENT
62M PMH CAD s/p stent on plavix and aspirin, DM, HTN, ESRD on dialysis M,W,F (last on 12/9) p/w b/l knee pain s/p fall. Found to have 1.3 subdural hematoma. Trauma surgery consulted to evaluate s/p fall    Recommendations:    - No surgical intervention   - Subdural hematoma care per neurosurgery  - Tertiary exam by trauma team tomorrow      Patient seen and examined. Plan discussed with Dr. Zacarias.    Kj Mcdermott MD, PGY2  ACS/Trauma  [phone]

## 2022-12-12 NOTE — ED ADULT NURSE NOTE - OBJECTIVE STATEMENT
62 yr old dialysis pt came in after falling down the stairs yesterday. he c/o bilat knee pain and bruising under chin. pt is on blood thinners. denies loc. on assessment a and o x 3 lungs clear bad soft non tender no swelling in extremities no n/v/d no fevers. fistula on l arm. goes for dialysis mwf.

## 2022-12-12 NOTE — ED ADULT NURSE REASSESSMENT NOTE - NS ED NURSE REASSESS COMMENT FT1
Pt tele packed and neuro resident contacted. Pt c/o SOB, O2 sat at 75% on RA. NSC[facility] provider contacted, instructed that ED attending should address issue given they have not met the Pt. Pt placed on Non re breather, Pt sating at 100% on 15L, Neuro resident came down for transport.
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
## 2022-12-12 NOTE — H&P ADULT - NSHPLABSRESULTS_GEN_ALL_CORE
LABS:                        10.4   11.46 )-----------( 210      ( 12 Dec 2022 17:23 )             33.6     12-12    141  |  99  |  37<H>  ----------------------------<  275<H>  3.3<L>   |  26  |  3.71<H>    Ca    8.7      12 Dec 2022 17:23    TPro  6.9  /  Alb  3.6  /  TBili  0.6  /  DBili  x   /  AST  <5<L>  /  ALT  7<L>  /  AlkPhos  105  12-12    PT/INR - ( 12 Dec 2022 17:23 )   PT: 13.1 sec;   INR: 1.14 ratio         PTT - ( 12 Dec 2022 17:23 )  PTT:33.3 sec

## 2022-12-12 NOTE — CONSULT NOTE ADULT - ASSESSMENT
62 year-old man with history of multiple medical issues including hypertension, diabetes mellitus, coronary artery diease, congestive heart failure with reduced EF, gout, and ESRD presents from PCP office for fall and is on a/C.    1 Ortho-Need to rule out fractures as on A/C  2 Renal-HD was not able to be set up for tonight and his center does not have HD on T/th/sat  He will need to be admitted to resume hd    Sayed [facility]   [phone]
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
## 2022-12-12 NOTE — ED PROVIDER NOTE - NSTIMEPROVIDERCAREINITIATE_GEN_ER
How Severe Is Your Skin Lesion?: moderate 12-Dec-2022 13:59 Has Your Skin Lesion Been Treated?: not been treated Is This A New Presentation, Or A Follow-Up?: Skin Lesion

## 2022-12-12 NOTE — PATIENT PROFILE ADULT - FALL HARM RISK - HARM RISK INTERVENTIONS
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
## 2022-12-13 DIAGNOSIS — E11.9 TYPE 2 DIABETES MELLITUS WITHOUT COMPLICATIONS: ICD-10-CM

## 2022-12-13 DIAGNOSIS — I10 ESSENTIAL (PRIMARY) HYPERTENSION: ICD-10-CM

## 2022-12-13 DIAGNOSIS — S09.90XA UNSPECIFIED INJURY OF HEAD, INITIAL ENCOUNTER: ICD-10-CM

## 2022-12-13 LAB
ANION GAP SERPL CALC-SCNC: 15 MMOL/L — SIGNIFICANT CHANGE UP (ref 5–17)
ANION GAP SERPL CALC-SCNC: 17 MMOL/L — SIGNIFICANT CHANGE UP (ref 5–17)
APPEARANCE UR: CLEAR — SIGNIFICANT CHANGE UP
BACTERIA # UR AUTO: NEGATIVE — SIGNIFICANT CHANGE UP
BASOPHILS # BLD AUTO: 0.03 K/UL — SIGNIFICANT CHANGE UP (ref 0–0.2)
BASOPHILS NFR BLD AUTO: 0.3 % — SIGNIFICANT CHANGE UP (ref 0–2)
BILIRUB UR-MCNC: NEGATIVE — SIGNIFICANT CHANGE UP
BLD GP AB SCN SERPL QL: NEGATIVE — SIGNIFICANT CHANGE UP
BUN SERPL-MCNC: 27 MG/DL — HIGH (ref 7–23)
BUN SERPL-MCNC: 36 MG/DL — HIGH (ref 7–23)
CALCIUM SERPL-MCNC: 8.1 MG/DL — LOW (ref 8.4–10.5)
CALCIUM SERPL-MCNC: 8.8 MG/DL — SIGNIFICANT CHANGE UP (ref 8.4–10.5)
CHLORIDE SERPL-SCNC: 102 MMOL/L — SIGNIFICANT CHANGE UP (ref 96–108)
CHLORIDE SERPL-SCNC: 97 MMOL/L — SIGNIFICANT CHANGE UP (ref 96–108)
CK SERPL-CCNC: 173 U/L — SIGNIFICANT CHANGE UP (ref 30–200)
CO2 SERPL-SCNC: 23 MMOL/L — SIGNIFICANT CHANGE UP (ref 22–31)
CO2 SERPL-SCNC: 23 MMOL/L — SIGNIFICANT CHANGE UP (ref 22–31)
COLOR SPEC: SIGNIFICANT CHANGE UP
CREAT SERPL-MCNC: 2.95 MG/DL — HIGH (ref 0.5–1.3)
CREAT SERPL-MCNC: 3.6 MG/DL — HIGH (ref 0.5–1.3)
DIFF PNL FLD: ABNORMAL
EGFR: 18 ML/MIN/1.73M2 — LOW
EGFR: 23 ML/MIN/1.73M2 — LOW
EOSINOPHIL # BLD AUTO: 0.01 K/UL — SIGNIFICANT CHANGE UP (ref 0–0.5)
EOSINOPHIL NFR BLD AUTO: 0.1 % — SIGNIFICANT CHANGE UP (ref 0–6)
EPI CELLS # UR: 3 /HPF — SIGNIFICANT CHANGE UP
GLUCOSE BLDC GLUCOMTR-MCNC: 118 MG/DL — HIGH (ref 70–99)
GLUCOSE BLDC GLUCOMTR-MCNC: 125 MG/DL — HIGH (ref 70–99)
GLUCOSE BLDC GLUCOMTR-MCNC: 166 MG/DL — HIGH (ref 70–99)
GLUCOSE BLDC GLUCOMTR-MCNC: 247 MG/DL — HIGH (ref 70–99)
GLUCOSE SERPL-MCNC: 175 MG/DL — HIGH (ref 70–99)
GLUCOSE SERPL-MCNC: 269 MG/DL — HIGH (ref 70–99)
GLUCOSE UR QL: ABNORMAL
GRAN CASTS # UR COMP ASSIST: ABNORMAL /LPF
HCT VFR BLD CALC: 28 % — LOW (ref 39–50)
HCT VFR BLD CALC: 29.5 % — LOW (ref 39–50)
HGB BLD-MCNC: 8.7 G/DL — LOW (ref 13–17)
HGB BLD-MCNC: 9 G/DL — LOW (ref 13–17)
HYALINE CASTS # UR AUTO: 4 /LPF — HIGH (ref 0–2)
IMM GRANULOCYTES NFR BLD AUTO: 0.4 % — SIGNIFICANT CHANGE UP (ref 0–0.9)
KETONES UR-MCNC: SIGNIFICANT CHANGE UP
LACTATE SERPL-SCNC: 0.7 MMOL/L — SIGNIFICANT CHANGE UP (ref 0.5–2)
LEUKOCYTE ESTERASE UR-ACNC: NEGATIVE — SIGNIFICANT CHANGE UP
LYMPHOCYTES # BLD AUTO: 1.77 K/UL — SIGNIFICANT CHANGE UP (ref 1–3.3)
LYMPHOCYTES # BLD AUTO: 16.6 % — SIGNIFICANT CHANGE UP (ref 13–44)
MAGNESIUM SERPL-MCNC: 1.9 MG/DL — SIGNIFICANT CHANGE UP (ref 1.6–2.6)
MAGNESIUM SERPL-MCNC: 2.2 MG/DL — SIGNIFICANT CHANGE UP (ref 1.6–2.6)
MCHC RBC-ENTMCNC: 27 PG — SIGNIFICANT CHANGE UP (ref 27–34)
MCHC RBC-ENTMCNC: 27.6 PG — SIGNIFICANT CHANGE UP (ref 27–34)
MCHC RBC-ENTMCNC: 30.5 GM/DL — LOW (ref 32–36)
MCHC RBC-ENTMCNC: 31.1 GM/DL — LOW (ref 32–36)
MCV RBC AUTO: 88.6 FL — SIGNIFICANT CHANGE UP (ref 80–100)
MCV RBC AUTO: 88.9 FL — SIGNIFICANT CHANGE UP (ref 80–100)
MONOCYTES # BLD AUTO: 1.27 K/UL — HIGH (ref 0–0.9)
MONOCYTES NFR BLD AUTO: 11.9 % — SIGNIFICANT CHANGE UP (ref 2–14)
NEUTROPHILS # BLD AUTO: 7.52 K/UL — HIGH (ref 1.8–7.4)
NEUTROPHILS NFR BLD AUTO: 70.7 % — SIGNIFICANT CHANGE UP (ref 43–77)
NITRITE UR-MCNC: NEGATIVE — SIGNIFICANT CHANGE UP
NRBC # BLD: 0 /100 WBCS — SIGNIFICANT CHANGE UP (ref 0–0)
NRBC # BLD: 0 /100 WBCS — SIGNIFICANT CHANGE UP (ref 0–0)
PH UR: 6.5 — SIGNIFICANT CHANGE UP (ref 5–8)
PHOSPHATE SERPL-MCNC: 2.8 MG/DL — SIGNIFICANT CHANGE UP (ref 2.5–4.5)
PHOSPHATE SERPL-MCNC: 4 MG/DL — SIGNIFICANT CHANGE UP (ref 2.5–4.5)
PLATELET # BLD AUTO: 185 K/UL — SIGNIFICANT CHANGE UP (ref 150–400)
PLATELET # BLD AUTO: 203 K/UL — SIGNIFICANT CHANGE UP (ref 150–400)
POTASSIUM SERPL-MCNC: 3.3 MMOL/L — LOW (ref 3.5–5.3)
POTASSIUM SERPL-MCNC: 4.2 MMOL/L — SIGNIFICANT CHANGE UP (ref 3.5–5.3)
POTASSIUM SERPL-SCNC: 3.3 MMOL/L — LOW (ref 3.5–5.3)
POTASSIUM SERPL-SCNC: 4.2 MMOL/L — SIGNIFICANT CHANGE UP (ref 3.5–5.3)
PROCALCITONIN SERPL-MCNC: 3.26 NG/ML — HIGH (ref 0.02–0.1)
PROT UR-MCNC: ABNORMAL
RAPID RVP RESULT: SIGNIFICANT CHANGE UP
RBC # BLD: 3.15 M/UL — LOW (ref 4.2–5.8)
RBC # BLD: 3.33 M/UL — LOW (ref 4.2–5.8)
RBC # FLD: 15.4 % — HIGH (ref 10.3–14.5)
RBC # FLD: 15.6 % — HIGH (ref 10.3–14.5)
RBC CASTS # UR COMP ASSIST: 2 /HPF — SIGNIFICANT CHANGE UP (ref 0–4)
RH IG SCN BLD-IMP: POSITIVE — SIGNIFICANT CHANGE UP
SARS-COV-2 RNA SPEC QL NAA+PROBE: SIGNIFICANT CHANGE UP
SODIUM SERPL-SCNC: 135 MMOL/L — SIGNIFICANT CHANGE UP (ref 135–145)
SODIUM SERPL-SCNC: 142 MMOL/L — SIGNIFICANT CHANGE UP (ref 135–145)
SP GR SPEC: 1.02 — SIGNIFICANT CHANGE UP (ref 1.01–1.02)
TROPONIN T, HIGH SENSITIVITY RESULT: 219 NG/L — HIGH (ref 0–51)
TROPONIN T, HIGH SENSITIVITY RESULT: 241 NG/L — HIGH (ref 0–51)
UROBILINOGEN FLD QL: NEGATIVE — SIGNIFICANT CHANGE UP
WBC # BLD: 10.64 K/UL — HIGH (ref 3.8–10.5)
WBC # BLD: 11.04 K/UL — HIGH (ref 3.8–10.5)
WBC # FLD AUTO: 10.64 K/UL — HIGH (ref 3.8–10.5)
WBC # FLD AUTO: 11.04 K/UL — HIGH (ref 3.8–10.5)
WBC UR QL: 1 /HPF — SIGNIFICANT CHANGE UP (ref 0–5)

## 2022-12-13 PROCEDURE — 93010 ELECTROCARDIOGRAM REPORT: CPT

## 2022-12-13 PROCEDURE — 99232 SBSQ HOSP IP/OBS MODERATE 35: CPT

## 2022-12-13 PROCEDURE — 99255 IP/OBS CONSLTJ NEW/EST HI 80: CPT

## 2022-12-13 PROCEDURE — 70450 CT HEAD/BRAIN W/O DYE: CPT | Mod: 26

## 2022-12-13 PROCEDURE — 93010 ELECTROCARDIOGRAM REPORT: CPT | Mod: 76

## 2022-12-13 RX ORDER — ALLOPURINOL 300 MG
1 TABLET ORAL
Qty: 0 | Refills: 0 | DISCHARGE

## 2022-12-13 RX ORDER — FINASTERIDE 5 MG/1
1 TABLET, FILM COATED ORAL
Qty: 0 | Refills: 0 | DISCHARGE

## 2022-12-13 RX ORDER — ACETAMINOPHEN 500 MG
650 TABLET ORAL EVERY 6 HOURS
Refills: 0 | Status: DISCONTINUED | OUTPATIENT
Start: 2022-12-13 | End: 2022-12-31

## 2022-12-13 RX ORDER — ACETAMINOPHEN 500 MG
1000 TABLET ORAL ONCE
Refills: 0 | Status: COMPLETED | OUTPATIENT
Start: 2022-12-13 | End: 2022-12-13

## 2022-12-13 RX ORDER — ASPIRIN/CALCIUM CARB/MAGNESIUM 324 MG
81 TABLET ORAL DAILY
Refills: 0 | Status: DISCONTINUED | OUTPATIENT
Start: 2022-12-13 | End: 2022-12-31

## 2022-12-13 RX ORDER — PANTOPRAZOLE SODIUM 20 MG/1
1 TABLET, DELAYED RELEASE ORAL
Qty: 0 | Refills: 0 | DISCHARGE

## 2022-12-13 RX ORDER — FAMOTIDINE 10 MG/ML
10 INJECTION INTRAVENOUS DAILY
Refills: 0 | Status: DISCONTINUED | OUTPATIENT
Start: 2022-12-13 | End: 2022-12-31

## 2022-12-13 RX ORDER — FAMOTIDINE 10 MG/ML
20 INJECTION INTRAVENOUS DAILY
Refills: 0 | Status: DISCONTINUED | OUTPATIENT
Start: 2022-12-13 | End: 2022-12-13

## 2022-12-13 RX ORDER — CARVEDILOL PHOSPHATE 80 MG/1
1 CAPSULE, EXTENDED RELEASE ORAL
Qty: 0 | Refills: 0 | DISCHARGE

## 2022-12-13 RX ORDER — DULOXETINE HYDROCHLORIDE 30 MG/1
30 CAPSULE, DELAYED RELEASE ORAL DAILY
Refills: 0 | Status: DISCONTINUED | OUTPATIENT
Start: 2022-12-13 | End: 2022-12-13

## 2022-12-13 RX ORDER — CHLORHEXIDINE GLUCONATE 213 G/1000ML
1 SOLUTION TOPICAL DAILY
Refills: 0 | Status: DISCONTINUED | OUTPATIENT
Start: 2022-12-13 | End: 2022-12-31

## 2022-12-13 RX ORDER — LEVETIRACETAM 250 MG/1
500 TABLET, FILM COATED ORAL AT BEDTIME
Refills: 0 | Status: COMPLETED | OUTPATIENT
Start: 2022-12-13 | End: 2022-12-19

## 2022-12-13 RX ORDER — INSULIN LISPRO 100/ML
8 VIAL (ML) SUBCUTANEOUS
Refills: 0 | Status: DISCONTINUED | OUTPATIENT
Start: 2022-12-13 | End: 2022-12-14

## 2022-12-13 RX ORDER — ONDANSETRON 8 MG/1
4 TABLET, FILM COATED ORAL ONCE
Refills: 0 | Status: COMPLETED | OUTPATIENT
Start: 2022-12-13 | End: 2022-12-13

## 2022-12-13 RX ORDER — POTASSIUM CHLORIDE 20 MEQ
20 PACKET (EA) ORAL ONCE
Refills: 0 | Status: COMPLETED | OUTPATIENT
Start: 2022-12-13 | End: 2022-12-13

## 2022-12-13 RX ORDER — METOPROLOL TARTRATE 50 MG
12.5 TABLET ORAL
Refills: 0 | Status: DISCONTINUED | OUTPATIENT
Start: 2022-12-13 | End: 2022-12-16

## 2022-12-13 RX ORDER — INSULIN GLARGINE 100 [IU]/ML
10 INJECTION, SOLUTION SUBCUTANEOUS AT BEDTIME
Refills: 0 | Status: DISCONTINUED | OUTPATIENT
Start: 2022-12-13 | End: 2022-12-14

## 2022-12-13 RX ADMIN — VALSARTAN 320 MILLIGRAM(S): 80 TABLET ORAL at 05:16

## 2022-12-13 RX ADMIN — INSULIN GLARGINE 10 UNIT(S): 100 INJECTION, SOLUTION SUBCUTANEOUS at 22:25

## 2022-12-13 RX ADMIN — Medication 20 MILLIEQUIVALENT(S): at 05:17

## 2022-12-13 RX ADMIN — LEVETIRACETAM 500 MILLIGRAM(S): 250 TABLET, FILM COATED ORAL at 05:16

## 2022-12-13 RX ADMIN — Medication 4: at 22:25

## 2022-12-13 RX ADMIN — AMLODIPINE BESYLATE 5 MILLIGRAM(S): 2.5 TABLET ORAL at 05:16

## 2022-12-13 RX ADMIN — Medication 40 MILLIGRAM(S): at 16:06

## 2022-12-13 RX ADMIN — Medication 2: at 05:17

## 2022-12-13 RX ADMIN — Medication 100 MILLIGRAM(S): at 16:06

## 2022-12-13 RX ADMIN — LIDOCAINE AND PRILOCAINE CREAM 1 APPLICATION(S): 25; 25 CREAM TOPICAL at 00:00

## 2022-12-13 RX ADMIN — Medication 12.5 MILLIGRAM(S): at 16:07

## 2022-12-13 RX ADMIN — Medication 650 MILLIGRAM(S): at 11:10

## 2022-12-13 RX ADMIN — LEVETIRACETAM 500 MILLIGRAM(S): 250 TABLET, FILM COATED ORAL at 22:26

## 2022-12-13 RX ADMIN — ATORVASTATIN CALCIUM 80 MILLIGRAM(S): 80 TABLET, FILM COATED ORAL at 22:26

## 2022-12-13 RX ADMIN — Medication 1000 MILLIGRAM(S): at 05:00

## 2022-12-13 RX ADMIN — TAMSULOSIN HYDROCHLORIDE 0.4 MILLIGRAM(S): 0.4 CAPSULE ORAL at 22:26

## 2022-12-13 RX ADMIN — Medication 81 MILLIGRAM(S): at 16:06

## 2022-12-13 RX ADMIN — ONDANSETRON 4 MILLIGRAM(S): 8 TABLET, FILM COATED ORAL at 16:06

## 2022-12-13 RX ADMIN — Medication 400 MILLIGRAM(S): at 04:23

## 2022-12-13 RX ADMIN — FAMOTIDINE 10 MILLIGRAM(S): 10 INJECTION INTRAVENOUS at 16:07

## 2022-12-13 NOTE — PROGRESS NOTE ADULT - ASSESSMENT
62 year-old man with history of multiple medical issues including hypertension, diabetes mellitus, coronary artery diease, congestive heart failure with reduced EF, gout, and ESRD presents from PCP office for fall and is on a/c and has subdural hematoma       1 Nuerosurg- Off A/C at present;  Started on Keppra   2 Renal-HD today and likely UF in am   3 CVS- Will maximize fluid removal to aid with pleural effusions     [facility]ayed [facility]   [phone]
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
## 2022-12-13 NOTE — PROGRESS NOTE ADULT - SUBJECTIVE AND OBJECTIVE BOX
[facility] [phone]        Patient seen and examined         MEDICATIONS  (STANDING):  allopurinol 100 milliGRAM(s) Oral daily  amLODIPine   Tablet 5 milliGRAM(s) Oral daily  aspirin  chewable 81 milliGRAM(s) Oral daily  atorvastatin 80 milliGRAM(s) Oral at bedtime  dextrose 50% Injectable 25 Gram(s) IV Push once  dextrose 50% Injectable 12.5 Gram(s) IV Push once  famotidine    Tablet 10 milliGRAM(s) Oral daily  glucagon  Injectable 1 milliGRAM(s) IntraMuscular once  insulin glargine Injectable (LANTUS) 10 Unit(s) SubCutaneous at bedtime  insulin lispro (ADMELOG) corrective regimen sliding scale   SubCutaneous every 6 hours  insulin lispro Injectable (ADMELOG) 8 Unit(s) SubCutaneous three times a day before meals  levETIRAcetam 500 milliGRAM(s) Oral at bedtime  lidocaine/prilocaine Cream 1 Application(s) Topical <User Schedule>  methylPREDNISolone sodium succinate Injectable 40 milliGRAM(s) IV Push once  tamsulosin 0.4 milliGRAM(s) Oral at bedtime  valsartan 320 milliGRAM(s) Oral daily      VITAL:  T(C): , Max: 38.5 (22 @ 04:00)  T(F): , Max: 101.3 (22 @ 04:00)  HR: 108 (22 @ 11:40)  BP: 136/56 (22 @ 11:40)  BP(mean): 82 (22 @ 11:00)  RR: 19 (22 @ 11:40)  SpO2: 98% (22 @ 11:40)  Wt(kg): --    I and O's:     @ 07:01  -   @ 07:00  --------------------------------------------------------  IN: 100 mL / OUT: 100 mL / NET: 0 mL     @ 07:01  -   @ 14:21  --------------------------------------------------------  IN: 100 mL / OUT: 200 mL / NET: -100 mL          PHYSICAL EXAM:    Constitutional: NAD  Neck:  No JVD  Respiratory: CTAB/L  Cardiovascular: S1 and S2  Gastrointestinal: BS+, soft, NT/ND  Extremities: No peripheral edema  Neurological: A/O x 3, no focal deficits  Psychiatric: Normal mood, normal affect  : No Jay  Skin: No rashes  Access: Not applicable    LABS:                        8.7    10.64 )-----------( 185      ( 13 Dec 2022 05:01 )             28.0         142  |  102  |  36<H>  ----------------------------<  175<H>  3.3<L>   |  23  |  3.60<H>    Ca    8.8      13 Dec 2022 03:03  Phos  2.8       Mg     2.2         TPro  6.9  /  Alb  3.6  /  TBili  0.6  /  DBili  x   /  AST  <5<L>  /  ALT  7<L>  /  AlkPhos  105  12          Urine Studies:  Urinalysis Basic - ( 13 Dec 2022 10:55 )    Color: Light Yellow / Appearance: Clear / S.018 / pH: x  Gluc: x / Ketone: Trace  / Bili: Negative / Urobili: Negative   Blood: x / Protein: 300 mg/dL / Nitrite: Negative   Leuk Esterase: Negative / RBC: 2 /hpf / WBC 1 /HPF   Sq Epi: x / Non Sq Epi: 3 /hpf / Bacteria: Negative            RADIOLOGY & ADDITIONAL STUDIES:          < from: CT Head No Cont (22 @ 08:28) >    ACC: [id number] EXAM:  CT BRAIN                          PROCEDURE DATE:  2022          INTERPRETATION:  Noncontrast CT of the brain.    CLINICAL INDICATION:  Follow-up right lateral convexity subdural   collection    TECHNIQUE : Axial CT scanning of the brain was obtained from the skull   base to the vertex without the administration of intravenous contrast.   Sagittal and coronal reformats were provided.    COMPARISON: CT brain 2022    FINDINGS:    Similar mixed density right lateral convexity subdural collection   measures 1.2 cm in greatest depth.    No parenchymal hemorrhage or brain edema.    No midline shift or effacement of basal cisterns. No hydrocephalus.    IMPRESSION:    No significant interval change.    Similar mixeddensity right lateral convexity subdural collection   measures 1.2 cm in greatest depth.    --- End of Report ---            STACI BOOTH MD; Attending Radiologist  This document has been electronically signed. Dec 13 2022  1:00PM    < end of copied text >
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
## 2022-12-13 NOTE — PROGRESS NOTE ADULT - SUBJECTIVE AND OBJECTIVE BOX
Chief complaint    Patient is a 62y old  Male who presents with a chief complaint of fall (13 Dec 2022 12:23)   Review of systems  Patient in bed, appears comfortable.    Labs and Fingersticks  CAPILLARY BLOOD GLUCOSE      POCT Blood Glucose.: 118 mg/dL (13 Dec 2022 10:51)  POCT Blood Glucose.: 166 mg/dL (13 Dec 2022 05:14)  POCT Blood Glucose.: 246 mg/dL (12 Dec 2022 23:04)      Anion Gap, Serum: 17 (12-13 @ 03:03)  Anion Gap, Serum: 16 (12-12 @ 17:23)      Calcium, Total Serum: 8.8 (12-13 @ 03:03)  Calcium, Total Serum: 8.7 (12-12 @ 17:23)  Albumin, Serum: 3.6 (12-12 @ 17:23)    Alanine Aminotransferase (ALT/SGPT): 7 *L* (12-12 @ 17:23)  Alkaline Phosphatase, Serum: 105 (12-12 @ 17:23)  Aspartate Aminotransferase (AST/SGOT): <5 *L* (12-12 @ 17:23)        12-13    142  |  102  |  36<H>  ----------------------------<  175<H>  3.3<L>   |  23  |  3.60<H>    Ca    8.8      13 Dec 2022 03:03  Phos  2.8     12-13  Mg     2.2     12-13    TPro  6.9  /  Alb  3.6  /  TBili  0.6  /  DBili  x   /  AST  <5<L>  /  ALT  7<L>  /  AlkPhos  105  12-12                        8.7    10.64 )-----------( 185      ( 13 Dec 2022 05:01 )             28.0     Medications  MEDICATIONS  (STANDING):  allopurinol 100 milliGRAM(s) Oral daily  amLODIPine   Tablet 5 milliGRAM(s) Oral daily  aspirin  chewable 81 milliGRAM(s) Oral daily  atorvastatin 80 milliGRAM(s) Oral at bedtime  dextrose 50% Injectable 25 Gram(s) IV Push once  dextrose 50% Injectable 12.5 Gram(s) IV Push once  famotidine    Tablet 10 milliGRAM(s) Oral daily  glucagon  Injectable 1 milliGRAM(s) IntraMuscular once  insulin glargine Injectable (LANTUS) 10 Unit(s) SubCutaneous at bedtime  insulin lispro (ADMELOG) corrective regimen sliding scale   SubCutaneous every 6 hours  insulin lispro Injectable (ADMELOG) 8 Unit(s) SubCutaneous three times a day before meals  levETIRAcetam 500 milliGRAM(s) Oral at bedtime  lidocaine/prilocaine Cream 1 Application(s) Topical <User Schedule>  methylPREDNISolone sodium succinate Injectable 40 milliGRAM(s) IV Push once  tamsulosin 0.4 milliGRAM(s) Oral at bedtime  valsartan 320 milliGRAM(s) Oral daily      Physical Exam  General: Patient appears comfortable.  Vital Signs Last 12 Hrs  T(F): 100 (12-13-22 @ 12:00), Max: 101.3 (12-13-22 @ 04:00)  HR: 108 (12-13-22 @ 11:40) (96 - 155)  BP: 136/56 (12-13-22 @ 11:40) (122/60 - 143/65)  BP(mean): 82 (12-13-22 @ 11:00) (74 - 99)  RR: 19 (12-13-22 @ 11:40) (12 - 29)  SpO2: 98% (12-13-22 @ 11:40) (94% - 99%)  Neck: No palpable thyroid nodules.  CVS: S1S2, No murmurs  Respiratory: No wheezing, no crepitations  GI: Abdomen soft, non tender.  Musculoskeletal:  edema lower extremities.     Diagnostics

## 2022-12-13 NOTE — PROGRESS NOTE ADULT - ASSESSMENT
62M PMH CAD s/p stent on plavix and aspirin, DM, HTN, ESRD on dialysis M,W,F (last on 12/9) p/w b/l knee pain s/p fall. Found to have 1.3 subdural hematoma. Trauma surgery consulted to evaluate s/p fall    Recommendations:    - No surgical intervention   - Subdural hematoma care per neurosurgery  - Please call back or page if needed      ACS/Trauma  [phone]

## 2022-12-13 NOTE — PROGRESS NOTE ADULT - SUBJECTIVE AND OBJECTIVE BOX
Patient seen and examined    T(C): 37.1 (12-13-22 @ 19:00), Max: 38.5 (12-13-22 @ 04:00)  HR: 100 (12-13-22 @ 19:00) (96 - 155)  BP: 119/55 (12-13-22 @ 19:00) (119/55 - 160/67)  RR: 16 (12-13-22 @ 19:00) (12 - 29)  SpO2: 99% (12-13-22 @ 19:00) (92% - 100%)  12-12-22 @ 07:01  -  12-13-22 @ 07:00  --------------------------------------------------------  IN: 100 mL / OUT: 100 mL / NET: 0 mL    12-13-22 @ 07:01  -  12-13-22 @ 20:40  --------------------------------------------------------  IN: 100 mL / OUT: 2800 mL / NET: -2700 mL    acetaminophen     Tablet .. 650 milliGRAM(s) Oral every 6 hours PRN  allopurinol 100 milliGRAM(s) Oral daily  amLODIPine   Tablet 5 milliGRAM(s) Oral daily  aspirin  chewable 81 milliGRAM(s) Oral daily  atorvastatin 80 milliGRAM(s) Oral at bedtime  dextrose 50% Injectable 25 Gram(s) IV Push once  dextrose 50% Injectable 12.5 Gram(s) IV Push once  famotidine    Tablet 10 milliGRAM(s) Oral daily  glucagon  Injectable 1 milliGRAM(s) IntraMuscular once  insulin glargine Injectable (LANTUS) 10 Unit(s) SubCutaneous at bedtime  insulin lispro (ADMELOG) corrective regimen sliding scale   SubCutaneous Before meals and at bedtime  insulin lispro Injectable (ADMELOG) 8 Unit(s) SubCutaneous three times a day before meals  levETIRAcetam 500 milliGRAM(s) Oral at bedtime  lidocaine/prilocaine Cream 1 Application(s) Topical <User Schedule>  metoprolol tartrate 12.5 milliGRAM(s) Oral two times a day  tamsulosin 0.4 milliGRAM(s) Oral at bedtime  valsartan 320 milliGRAM(s) Oral daily        Exam stable    labs and imaging reviewed     Continue same management   started on aspirin today , CT head tomorrow, if stable will discus with NS i ok to transfer to floor     Eva Obrien NSCU attending
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
## 2022-12-13 NOTE — PROGRESS NOTE ADULT - SUBJECTIVE AND OBJECTIVE BOX
dione  REVIEW OF SYSTEMS:  GEN: no fever,    no chills  RESP: no SOB,   no cough  CVS: no chest pain,   no palpitations  GI: no abdominal pain,   no nausea,   no vomiting,   no constipation,   no diarrhea  : no dysuria,   no frequency  NEURO: no headache,   no dizziness  PSYCH: no depression,   not anxious  Derm : no rash    MEDICATIONS  (STANDING):  allopurinol 100 milliGRAM(s) Oral daily  amLODIPine   Tablet 5 milliGRAM(s) Oral daily  atorvastatin 80 milliGRAM(s) Oral at bedtime  dextrose 50% Injectable 25 Gram(s) IV Push once  dextrose 50% Injectable 12.5 Gram(s) IV Push once  glucagon  Injectable 1 milliGRAM(s) IntraMuscular once  insulin glargine Injectable (LANTUS) 5 Unit(s) SubCutaneous at bedtime  insulin lispro (ADMELOG) corrective regimen sliding scale   SubCutaneous every 6 hours  levETIRAcetam 500 milliGRAM(s) Oral two times a day  lidocaine/prilocaine Cream 1 Application(s) Topical <User Schedule>  tamsulosin 0.4 milliGRAM(s) Oral at bedtime  valsartan 320 milliGRAM(s) Oral daily    MEDICATIONS  (PRN):      Vital Signs Last 24 Hrs  T(C): 36.9 (13 Dec 2022 07:00), Max: 38.5 (13 Dec 2022 04:00)  T(F): 98.5 (13 Dec 2022 07:00), Max: 101.3 (13 Dec 2022 04:00)  HR: 105 (13 Dec 2022 08:00) (88 - 155)  BP: 140/59 (13 Dec 2022 08:00) (122/60 - 160/67)  BP(mean): 79 (13 Dec 2022 08:00) (74 - 99)  RR: 17 (13 Dec 2022 08:00) (12 - 29)  SpO2: 97% (13 Dec 2022 08:00) (76% - 100%)    Parameters below as of 13 Dec 2022 07:00  Patient On (Oxygen Delivery Method): nasal cannula  O2 Flow (L/min): 2    CAPILLARY BLOOD GLUCOSE      POCT Blood Glucose.: 166 mg/dL (13 Dec 2022 05:14)  POCT Blood Glucose.: 246 mg/dL (12 Dec 2022 23:04)    I&O's Summary    12 Dec 2022 07:01  -  13 Dec 2022 07:00  --------------------------------------------------------  IN: 100 mL / OUT: 100 mL / NET: 0 mL    13 Dec 2022 07:01  -  13 Dec 2022 09:21  --------------------------------------------------------  IN: 0 mL / OUT: 100 mL / NET: -100 mL        PHYSICAL EXAM:  HEAD:  Atraumatic, Normocephalic  NECK: Supple, No   JVD  CHEST/LUNG:   no     rales,     no,    rhonchi  HEART: Regular rate and rhythm;         murmur  ABDOMEN: Soft, Nontender, ;   EXTREMITIES:   no     edema  NEUROLOGY:  alert    LABS:                        8.7    10.64 )-----------( 185      ( 13 Dec 2022 05:01 )             28.0     12-13    142  |  102  |  36<H>  ----------------------------<  175<H>  3.3<L>   |  23  |  3.60<H>    Ca    8.8      13 Dec 2022 03:03  Phos  2.8     12-13  Mg     2.2     12-13    TPro  6.9  /  Alb  3.6  /  TBili  0.6  /  DBili  x   /  AST  <5<L>  /  ALT  7<L>  /  AlkPhos  105  12-12    PT/INR - ( 12 Dec 2022 17:23 )   PT: 13.1 sec;   INR: 1.14 ratio         PTT - ( 12 Dec 2022 17:23 )  PTT:33.3 sec        Lactate, Blood: 0.7 mmol/L (12-13 @ 05:01)                  Consultant(s) Notes Reviewed:      Care Discussed with Consultants/Other Providers:

## 2022-12-13 NOTE — PROGRESS NOTE ADULT - ASSESSMENT
Assessment  DMT2: 62y Male with DM T2 with hyperglycemia admitted with head injury, patient was on oral hypoglycemic agents at home, now on  insulin, blood sugars improving,  no hypoglycemic episode.  Patient is high risk with high level decision making due to uncontrolled diabetes, has increased risk for complications. Tight sugar control is not recommended for this patient.  SDH:  on medications, monitored, stable.  HTN: On antihypertensive medications, monitored, asymptomatic.                Rubén Escoto MD  Cell:  [phone]  Office: [phone]
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
## 2022-12-13 NOTE — PROGRESS NOTE ADULT - PROBLEM SELECTOR PLAN 1
Will continue current insulin regimen for now. Will continue monitoring  blood sugars, will Follow up.  Patient counseled for compliance with consistent low carb diet.  .

## 2022-12-13 NOTE — PROGRESS NOTE ADULT - ASSESSMENT
A/P:  acute traumatic  R lateral convexity 1.3 cm SDH extending into interhemispheric fissure while on aspirin and plavix       Neuro: neuro checks q 1 hr  keppra 500 mg Q 1 2 hr   will get CT cervical spine  repeat CT head now     Respiratory:  desat, will get chest xray , on NC     CV: CAD s/p stenting, on aspirin and plavix, hold off antiplts per NS due to risk of hematoma expansion   HF  EF 39 %   SBP goal 100-160 mmhg   resume home amlodipine , lipitor ,and valsartan     Endocrine: finger sticks q 6 hr, ISS    give lantus 5 units ( at home is on 10 units)     HEME:  DVT ppx: hold chemoprophylaxis given PBD 0     Renal: ESRD renal consult, T/th/sat  resume epoetin   possible HD tomorrow, will follow   resume flomax   gout on allopurinol at home     ID: afebrile   COVID + , infection control consulted     GI: resume famotidine   NPO         Social/Family:   Discharge planning:     Code Status: [x] Full Code [] DNR [] DNI [] Goals of Care:   Disposition: [x] ICU [] Stroke Unit [] RCU []PCU []Floor [] Discharge Home     Patient at high risk for neurologic deterioration, critical care time, excluding procedures: 45 minutes          A/P:  acute traumatic  R lateral convexity 1.3 cm SDH extending into interhemispheric fissure while on aspirin and plavix       Neuro: neuro checks q 2 hr  CTH today stable  keppra 500 mg QD  needs clearance for c collar, CT cervical spine FU    Respiratory:  RA  IS  has bilat pleur eff on CT chest, recent covid+  HD today    CV: CAD s/p stenting, on aspirin and plavix, hold off antiplts per NS due to risk of hematoma expansion   HF  EF 39 %   restart coreg  SBP goal 100-160 mmhg   resume home amlodipine , lipitor ,and valsartan     Endocrine: finger sticks q 6 hr, ISS    give lantus 5 units ( at home is on 10 units)     HEME:  DVT ppx: hold chemoprophylaxis given PBD 0   LED check, RUE dopplers given hand swelling that pt reports is gout    Renal: ESRD renal consult, T/th/sat  resume epoetin   possible HD today will follow   resume flomax   gout on allopurinol at home     ID: febrile fu cultures  send UA  COVID + but repeat neg , infection control consulted     GI: resume famotidine   keep NPO   bowel regimen        Social/Family:   Discharge planning:     Code Status: [x] Full Code [] DNR [] DNI [] Goals of Care:   Disposition: [x] ICU [] Stroke Unit [] RCU []PCU []Floor [] Discharge Home     Patient at high risk for neurologic deterioration, critical care time, excluding procedures: 45 minutes          A/P:  acute traumatic  R lateral convexity 1.3 cm SDH extending into interhemispheric fissure while on aspirin and plavix       Neuro: neuro checks q 4 hr  CTH today stable  keppra 500 mg QDx7  needs clearance for c collar, CT cervical spine FU    Respiratory:  RA  IS  has bilat pleur eff on CT chest, recent covid+  HD today    CV: CAD s/p stenting, on aspirin and plavix, hold off antiplts per NS due to risk of hematoma expansion   HF  EF 39 %   restart coreg  SBP goal 100-160 mmhg   resume home amlodipine , lipitor ,and valsartan     Endocrine: finger sticks  achs  resume lantus 10 units ( at home is on 10 units)     HEME:  DVT ppx: hold chemoprophylaxis given PBD 0   LED check, RUE dopplers given hand swelling that pt reports is gout    Renal: ESRD renal consult, T/th/sat  resume epoetin   possible HD today will follow   resume flomax   gout on allopurinol at home   starting oral steroids for gout    ID: febrile fu cultures  send UA  COVID + but repeat neg , infection control consulted     GI: resume famotidine   start diet, ADAT  bowel regimen        Social/Family:   Discharge planning:     Code Status: [x] Full Code [] DNR [] DNI [] Goals of Care:   Disposition: [x] ICU [] Stroke Unit [] RCU []PCU []Floor [] Discharge Home     Patient at high risk for neurologic deterioration, critical care time, excluding procedures: 45 minutes          A/P:  acute traumatic  R lateral convexity 1.3 cm SDH extending into interhemispheric fissure while on aspirin and plavix       Neuro: neuro checks q 4 hr  CTH today stable  keppra 500 mg QDx7  needs clearance for c collar, CT cervical spine FU    Respiratory:  RA  IS  has bilat pleur eff on CT chest, recent covid+  HD today    CV: CAD s/p stenting, on aspirin and plavix, hold off antiplts per NS due to risk of hematoma expansion   HF  EF 39 %   restart coreg  SBP goal 100-160 mmhg   resume home amlodipine , lipitor ,and valsartan     Endocrine: finger sticks  achs  resume lantus 10 units ( at home is on 10 units)     HEME:  DVT ppx: hold chemoprophylaxis given PBD 0   LED check, RUE dopplers given hand swelling that pt reports is gout    Renal: ESRD renal consult, T/th/sat  resume epoetin   possible HD today will follow   resume flomax   gout on allopurinol at home   starting oral steroids for gout    ID: febrile fu cultures  send UA  COVID + but repeat neg , infection control consulted     GI: resume famotidine   start diet, ADAT  bowel regimen        Social/Family:   Discharge planning:     Code Status: [x] Full Code [] DNR [] DNI [] Goals of Care:   Disposition: [x] ICU [] Stroke Unit [] RCU []PCU []Floor [] Discharge Home

## 2022-12-14 LAB
ANION GAP SERPL CALC-SCNC: 21 MMOL/L — HIGH (ref 5–17)
BUN SERPL-MCNC: 62 MG/DL — HIGH (ref 7–23)
CALCIUM SERPL-MCNC: 8.2 MG/DL — LOW (ref 8.4–10.5)
CHLORIDE SERPL-SCNC: 97 MMOL/L — SIGNIFICANT CHANGE UP (ref 96–108)
CO2 SERPL-SCNC: 14 MMOL/L — LOW (ref 22–31)
CREAT SERPL-MCNC: 4.97 MG/DL — HIGH (ref 0.5–1.3)
EGFR: 12 ML/MIN/1.73M2 — LOW
GLUCOSE BLDC GLUCOMTR-MCNC: 193 MG/DL — HIGH (ref 70–99)
GLUCOSE BLDC GLUCOMTR-MCNC: 273 MG/DL — HIGH (ref 70–99)
GLUCOSE BLDC GLUCOMTR-MCNC: 280 MG/DL — HIGH (ref 70–99)
GLUCOSE BLDC GLUCOMTR-MCNC: 359 MG/DL — HIGH (ref 70–99)
GLUCOSE SERPL-MCNC: 195 MG/DL — HIGH (ref 70–99)
HBV SURFACE AB SER-ACNC: SIGNIFICANT CHANGE UP
HBV SURFACE AG SER-ACNC: SIGNIFICANT CHANGE UP
HCT VFR BLD CALC: 30.9 % — LOW (ref 39–50)
HGB BLD-MCNC: 9.3 G/DL — LOW (ref 13–17)
MAGNESIUM SERPL-MCNC: 2.5 MG/DL — SIGNIFICANT CHANGE UP (ref 1.6–2.6)
MCHC RBC-ENTMCNC: 27.8 PG — SIGNIFICANT CHANGE UP (ref 27–34)
MCHC RBC-ENTMCNC: 30.1 GM/DL — LOW (ref 32–36)
MCV RBC AUTO: 92.2 FL — SIGNIFICANT CHANGE UP (ref 80–100)
NRBC # BLD: 0 /100 WBCS — SIGNIFICANT CHANGE UP (ref 0–0)
PHOSPHATE SERPL-MCNC: 5.1 MG/DL — HIGH (ref 2.5–4.5)
PLATELET # BLD AUTO: 199 K/UL — SIGNIFICANT CHANGE UP (ref 150–400)
POTASSIUM SERPL-MCNC: 4.7 MMOL/L — SIGNIFICANT CHANGE UP (ref 3.5–5.3)
POTASSIUM SERPL-SCNC: 4.7 MMOL/L — SIGNIFICANT CHANGE UP (ref 3.5–5.3)
RBC # BLD: 3.35 M/UL — LOW (ref 4.2–5.8)
RBC # FLD: 15.5 % — HIGH (ref 10.3–14.5)
SODIUM SERPL-SCNC: 132 MMOL/L — LOW (ref 135–145)
WBC # BLD: 11.19 K/UL — HIGH (ref 3.8–10.5)
WBC # FLD AUTO: 11.19 K/UL — HIGH (ref 3.8–10.5)

## 2022-12-14 PROCEDURE — 70450 CT HEAD/BRAIN W/O DYE: CPT | Mod: 26

## 2022-12-14 PROCEDURE — 99232 SBSQ HOSP IP/OBS MODERATE 35: CPT

## 2022-12-14 PROCEDURE — 93970 EXTREMITY STUDY: CPT | Mod: 26

## 2022-12-14 PROCEDURE — 93971 EXTREMITY STUDY: CPT | Mod: 26,RT,59

## 2022-12-14 PROCEDURE — 99222 1ST HOSP IP/OBS MODERATE 55: CPT | Mod: GC

## 2022-12-14 PROCEDURE — 99233 SBSQ HOSP IP/OBS HIGH 50: CPT

## 2022-12-14 RX ORDER — INSULIN LISPRO 100/ML
VIAL (ML) SUBCUTANEOUS
Refills: 0 | Status: DISCONTINUED | OUTPATIENT
Start: 2022-12-14 | End: 2022-12-15

## 2022-12-14 RX ORDER — MAGNESIUM SULFATE 500 MG/ML
1 VIAL (ML) INJECTION ONCE
Refills: 0 | Status: COMPLETED | OUTPATIENT
Start: 2022-12-14 | End: 2022-12-14

## 2022-12-14 RX ORDER — INSULIN LISPRO 100/ML
14 VIAL (ML) SUBCUTANEOUS
Refills: 0 | Status: DISCONTINUED | OUTPATIENT
Start: 2022-12-14 | End: 2022-12-15

## 2022-12-14 RX ORDER — INSULIN GLARGINE 100 [IU]/ML
18 INJECTION, SOLUTION SUBCUTANEOUS AT BEDTIME
Refills: 0 | Status: DISCONTINUED | OUTPATIENT
Start: 2022-12-14 | End: 2022-12-15

## 2022-12-14 RX ORDER — INSULIN LISPRO 100/ML
8 VIAL (ML) SUBCUTANEOUS
Refills: 0 | Status: DISCONTINUED | OUTPATIENT
Start: 2022-12-14 | End: 2022-12-14

## 2022-12-14 RX ORDER — POLYETHYLENE GLYCOL 3350 17 G/17G
17 POWDER, FOR SOLUTION ORAL
Refills: 0 | Status: DISCONTINUED | OUTPATIENT
Start: 2022-12-14 | End: 2022-12-31

## 2022-12-14 RX ORDER — INSULIN GLARGINE 100 [IU]/ML
14 INJECTION, SOLUTION SUBCUTANEOUS AT BEDTIME
Refills: 0 | Status: DISCONTINUED | OUTPATIENT
Start: 2022-12-14 | End: 2022-12-14

## 2022-12-14 RX ORDER — INSULIN LISPRO 100/ML
6 VIAL (ML) SUBCUTANEOUS
Refills: 0 | Status: DISCONTINUED | OUTPATIENT
Start: 2022-12-14 | End: 2022-12-14

## 2022-12-14 RX ORDER — SENNA PLUS 8.6 MG/1
2 TABLET ORAL AT BEDTIME
Refills: 0 | Status: DISCONTINUED | OUTPATIENT
Start: 2022-12-14 | End: 2022-12-31

## 2022-12-14 RX ADMIN — Medication 12.5 MILLIGRAM(S): at 21:09

## 2022-12-14 RX ADMIN — Medication 100 GRAM(S): at 02:59

## 2022-12-14 RX ADMIN — Medication 100 MILLIGRAM(S): at 11:23

## 2022-12-14 RX ADMIN — Medication 10: at 12:36

## 2022-12-14 RX ADMIN — VALSARTAN 320 MILLIGRAM(S): 80 TABLET ORAL at 05:56

## 2022-12-14 RX ADMIN — SENNA PLUS 2 TABLET(S): 8.6 TABLET ORAL at 22:01

## 2022-12-14 RX ADMIN — Medication 14 UNIT(S): at 17:17

## 2022-12-14 RX ADMIN — INSULIN GLARGINE 18 UNIT(S): 100 INJECTION, SOLUTION SUBCUTANEOUS at 21:10

## 2022-12-14 RX ADMIN — AMLODIPINE BESYLATE 5 MILLIGRAM(S): 2.5 TABLET ORAL at 05:57

## 2022-12-14 RX ADMIN — LEVETIRACETAM 500 MILLIGRAM(S): 250 TABLET, FILM COATED ORAL at 21:10

## 2022-12-14 RX ADMIN — Medication 12: at 17:16

## 2022-12-14 RX ADMIN — Medication 81 MILLIGRAM(S): at 11:23

## 2022-12-14 RX ADMIN — Medication 6: at 08:49

## 2022-12-14 RX ADMIN — TAMSULOSIN HYDROCHLORIDE 0.4 MILLIGRAM(S): 0.4 CAPSULE ORAL at 21:09

## 2022-12-14 RX ADMIN — ATORVASTATIN CALCIUM 80 MILLIGRAM(S): 80 TABLET, FILM COATED ORAL at 21:09

## 2022-12-14 RX ADMIN — FAMOTIDINE 10 MILLIGRAM(S): 10 INJECTION INTRAVENOUS at 11:22

## 2022-12-14 RX ADMIN — Medication 6 UNIT(S): at 12:37

## 2022-12-14 RX ADMIN — CHLORHEXIDINE GLUCONATE 1 APPLICATION(S): 213 SOLUTION TOPICAL at 11:23

## 2022-12-14 RX ADMIN — Medication 12.5 MILLIGRAM(S): at 06:18

## 2022-12-14 RX ADMIN — POLYETHYLENE GLYCOL 3350 17 GRAM(S): 17 POWDER, FOR SOLUTION ORAL at 17:17

## 2022-12-14 NOTE — PROGRESS NOTE ADULT - ASSESSMENT
A/P:  acute traumatic  R lateral convexity 1.3 cm SDH extending into interhemispheric fissure while on Aspirin and Plavix     Neuro: neuro checks q 4 hr  asa 81, CTH today- STABLE  Keppra 500 mg QDx7  needs clearance for c collar, CT cervical spine FU  PT, OOB as tolerated    Respiratory:  RA  IS  has bilat pleur eff on CT chest, recent covid+  HD today    CV: CAD s/p stenting, on aspirin and plavix, hold off antiplts per NS due to risk of hematoma expansion   HF  EF 39 %   cont BB  SBP goal 100-160 mmHg   cont home amlodipine, Lipitor, and valsartan     Endocrine: finger sticks  ACHS  cont Lantus 14 units ( at home is on 10 units)   ISS  endo following    HEME:  DVT ppx: chemoprophylaxis  resume if ok w nsgy  LED neg RUE dopplers given hand swelling that pt reports is gout    Renal: ESRD renal consult, T/th/sat  Resume epoetin   Possible HD edouard  Resume Flomax   Gout on allopurinol at home   Oral steroids for gout 1 dose, rheumatology consulted    ID: Afebrile, mild leukocytosis   BC negative  COVID + but repeat neg, infection control consulted   LED negative    GI: Resume famotidine   CCD diet, ADAT  bowel regimen    Social/Family:   Discharge planning:     Code Status: [x] Full Code [] DNR [] DNI [] Goals of Care:   Disposition: [] ICU [] Stroke Unit [] RCU []PCU [x]Floor [] Discharge Home
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
## 2022-12-14 NOTE — PHYSICAL THERAPY INITIAL EVALUATION ADULT - IMPAIRMENTS CONTRIBUTING IMPAIRED BED MOBILITY, REHAB EVAL
impaired balance/pain/decreased ROM/decreased strength impaired balance/decreased ROM/decreased strength

## 2022-12-14 NOTE — PROGRESS NOTE ADULT - SUBJECTIVE AND OBJECTIVE BOX
[facility]    REVIEW OF SYSTEMS:  GEN: no fever,    no chills  RESP: no SOB,   no cough  CVS: no chest pain,   no palpitations  GI: no abdominal pain,   no nausea,   no vomiting,   no constipation,   no diarrhea  : no dysuria,   no frequency  NEURO: no headache,   no dizziness  PSYCH: no depression,   not anxious  Derm : no rash    MEDICATIONS  (STANDING):  allopurinol 100 milliGRAM(s) Oral daily  amLODIPine   Tablet 5 milliGRAM(s) Oral daily  aspirin  chewable 81 milliGRAM(s) Oral daily  atorvastatin 80 milliGRAM(s) Oral at bedtime  chlorhexidine 4% Liquid 1 Application(s) Topical daily  famotidine    Tablet 10 milliGRAM(s) Oral daily  insulin glargine Injectable (LANTUS) 18 Unit(s) SubCutaneous at bedtime  insulin lispro (ADMELOG) corrective regimen sliding scale   SubCutaneous three times a day before meals  insulin lispro Injectable (ADMELOG) 14 Unit(s) SubCutaneous three times a day before meals  levETIRAcetam 500 milliGRAM(s) Oral at bedtime  lidocaine/prilocaine Cream 1 Application(s) Topical <User Schedule>  metoprolol tartrate 12.5 milliGRAM(s) Oral two times a day  polyethylene glycol 3350 17 Gram(s) Oral two times a day  senna 2 Tablet(s) Oral at bedtime  tamsulosin 0.4 milliGRAM(s) Oral at bedtime  valsartan 320 milliGRAM(s) Oral daily    MEDICATIONS  (PRN):  acetaminophen     Tablet .. 650 milliGRAM(s) Oral every 6 hours PRN Temp greater or equal to 38C (100.4F), Mild Pain (1 - 3)      Vital Signs Last 24 Hrs  T(C): 36.6 (14 Dec 2022 20:00), Max: 36.6 (14 Dec 2022 07:00)  T(F): 97.8 (14 Dec 2022 20:00), Max: 97.9 (14 Dec 2022 07:00)  HR: 85 (14 Dec 2022 19:00) (77 - 100)  BP: 105/51 (14 Dec 2022 20:00) (94/49 - 133/65)  BP(mean): 69 (14 Dec 2022 20:00) (64 - 84)  RR: 18 (14 Dec 2022 20:00) (14 - 32)  SpO2: 100% (14 Dec 2022 20:00) (94% - 100%)    Parameters below as of 14 Dec 2022 20:00  Patient On (Oxygen Delivery Method): room air      CAPILLARY BLOOD GLUCOSE      POCT Blood Glucose.: 273 mg/dL (14 Dec 2022 17:13)  POCT Blood Glucose.: 359 mg/dL (14 Dec 2022 12:33)  POCT Blood Glucose.: 280 mg/dL (14 Dec 2022 08:44)  POCT Blood Glucose.: 247 mg/dL (13 Dec 2022 22:12)    I&O's Summary    13 Dec 2022 07:01  -  14 Dec 2022 07:00  --------------------------------------------------------  IN: 300 mL / OUT: 2800 mL / NET: -2500 mL    14 Dec 2022 07:01  -  14 Dec 2022 20:03  --------------------------------------------------------  IN: 600 mL / OUT: 0 mL / NET: 600 mL        PHYSICAL EXAM:  HEAD:  Atraumatic, Normocephalic  NECK: Supple, No   JVD  CHEST/LUNG:   no     rales,     no,    rhonchi  HEART: Regular rate and rhythm;         murmur  ABDOMEN: Soft, Nontender, ;   EXTREMITIES:        edema  NEUROLOGY:  alert    LABS:                        9.0    11.04 )-----------( 203      ( 13 Dec 2022 22:23 )             29.5     12-13    135  |  97  |  27<H>  ----------------------------<  269<H>  4.2   |  23  |  2.95<H>    Ca    8.1<L>      13 Dec 2022 22:23  Phos  4.0     12-13  Mg     1.9     12-13        CARDIAC MARKERS ( 13 Dec 2022 10:53 )  x     / x     / 173 U/L / x     / x          Urinalysis Basic - ( 13 Dec 2022 10:55 )    Color: Light Yellow / Appearance: Clear / S.018 / pH: x  Gluc: x / Ketone: Trace  / Bili: Negative / Urobili: Negative   Blood: x / Protein: 300 mg/dL / Nitrite: Negative   Leuk Esterase: Negative / RBC: 2 /hpf / WBC 1 /HPF   Sq Epi: x / Non Sq Epi: 3 /hpf / Bacteria: Negative      Lactate, Blood: 0.7 mmol/L ( @ 05:01)                  Consultant(s) Notes Reviewed:      Care Discussed with Consultants/Other Providers:

## 2022-12-14 NOTE — PROGRESS NOTE ADULT - SUBJECTIVE AND OBJECTIVE BOX
NEPHROLOGY-[facility] [phone]        Patient seen and examined in bed.  He was the same   Febrile         MEDICATIONS  (STANDING):  allopurinol 100 milliGRAM(s) Oral daily  amLODIPine   Tablet 5 milliGRAM(s) Oral daily  aspirin  chewable 81 milliGRAM(s) Oral daily  atorvastatin 80 milliGRAM(s) Oral at bedtime  chlorhexidine 4% Liquid 1 Application(s) Topical daily  dextrose 50% Injectable 25 Gram(s) IV Push once  dextrose 50% Injectable 12.5 Gram(s) IV Push once  famotidine    Tablet 10 milliGRAM(s) Oral daily  glucagon  Injectable 1 milliGRAM(s) IntraMuscular once  insulin glargine Injectable (LANTUS) 14 Unit(s) SubCutaneous at bedtime  insulin lispro (ADMELOG) corrective regimen sliding scale   SubCutaneous Before meals and at bedtime  insulin lispro Injectable (ADMELOG) 6 Unit(s) SubCutaneous three times a day before meals  levETIRAcetam 500 milliGRAM(s) Oral at bedtime  lidocaine/prilocaine Cream 1 Application(s) Topical <User Schedule>  metoprolol tartrate 12.5 milliGRAM(s) Oral two times a day  tamsulosin 0.4 milliGRAM(s) Oral at bedtime  valsartan 320 milliGRAM(s) Oral daily      VITAL:  T(C): , Max: 38.2 (22 @ 11:00)  T(F): , Max: 100.8 (22 @ 11:00)  HR: 88 (22 @ 09:00)  BP: 123/57 (22 @ 09:00)  BP(mean): 77 (22 @ 09:00)  RR: 18 (22 @ 09:00)  SpO2: 97% (22 @ 09:00)  Wt(kg): --    I and O's:     @ 07:01  -   @ 07:00  --------------------------------------------------------  IN: 300 mL / OUT: 2800 mL / NET: -2500 mL     @ 07:01  -   @ 09:46  --------------------------------------------------------  IN: 240 mL / OUT: 0 mL / NET: 240 mL          PHYSICAL EXAM:    Constitutional: NAD  Neck:  No JVD  Respiratory: CTAB/L  Cardiovascular: S1 and S2  Gastrointestinal: BS+, soft, NT/ND  Extremities: No peripheral edema  Neurological: A/O x 3, no focal deficits  Psychiatric: Normal mood, normal affect  : No Jay  Skin: No rashes  Access: Fistula     LABS:                        9.0    11.04 )-----------( 203      ( 13 Dec 2022 22:23 )             29.5     12-    135  |  97  |  27<H>  ----------------------------<  269<H>  4.2   |  23  |  2.95<H>    Ca    8.1<L>      13 Dec 2022 22:23  Phos  4.0     12-13  Mg     1.9     -    TPro  6.9  /  Alb  3.6  /  TBili  0.6  /  DBili  x   /  AST  <5<L>  /  ALT  7<L>  /  AlkPhos  105  12-12          Urine Studies:  Urinalysis Basic - ( 13 Dec 2022 10:55 )    Color: Light Yellow / Appearance: Clear / S.018 / pH: x  Gluc: x / Ketone: Trace  / Bili: Negative / Urobili: Negative   Blood: x / Protein: 300 mg/dL / Nitrite: Negative   Leuk Esterase: Negative / RBC: 2 /hpf / WBC 1 /HPF   Sq Epi: x / Non Sq Epi: 3 /hpf / Bacteria: Negative            RADIOLOGY & ADDITIONAL STUDIES:      < from: CT Head No Cont (22 @ 08:28) >    ACC: [id number] EXAM:  CT BRAIN                          PROCEDURE DATE:  2022          INTERPRETATION:  Noncontrast CT of the brain.    CLINICAL INDICATION:  Follow-up right lateral convexity subdural   collection    TECHNIQUE : Axial CT scanning of the brain was obtained from the skull   base to the vertex without the administration of intravenous contrast.   Sagittal and coronal reformats were provided.    COMPARISON: CT brain 2022    FINDINGS:    Similar mixed density right lateral convexity subdural collection   measures 1.2 cm in greatest depth.    No parenchymal hemorrhage or brain edema.    No midline shift or effacement of basal cisterns. No hydrocephalus.    IMPRESSION:    No significant interval change.    Similar mixeddensity right lateral convexity subdural collection   measures 1.2 cm in greatest depth.    --- End of Report ---            STACI BOOTH MD; Attending Radiologist  This document has been electronically signed. Dec 13 2022    < end of copied text >

## 2022-12-14 NOTE — PROGRESS NOTE ADULT - ASSESSMENT
A/P:  acute traumatic  R lateral convexity 1.3 cm SDH extending into interhemispheric fissure while on aspirin and plavix       Neuro: neuro checks q 4 hr  CTH today stable  restarted asa 81, cth today  keppra 500 mg QDx7  needs clearance for c collar, CT cervical spine FU    Respiratory:  RA  IS  has bilat pleur eff on CT chest, recent covid+  HD today    CV: CAD s/p stenting, on aspirin and plavix, hold off antiplts per NS due to risk of hematoma expansion   HF  EF 39 %   restart coreg  SBP goal 100-160 mmhg   resume home amlodipine , lipitor ,and valsartan     Endocrine: finger sticks  achs  resume lantus 10 units ( at home is on 10 units)     HEME:  DVT ppx: hold chemoprophylaxis given PBD 0   LED check, RUE dopplers given hand swelling that pt reports is gout    Renal: ESRD renal consult, T/th/sat  resume epoetin   possible HD today will follow   resume flomax   gout on allopurinol at home   starting oral steroids for gout    ID: febrile fu cultures  send UA  COVID + but repeat neg , infection control consulted     GI: resume famotidine   start diet, ADAT  bowel regimen        Social/Family:   Discharge planning:     Code Status: [x] Full Code [] DNR [] DNI [] Goals of Care:   Disposition: [] ICU [] Stroke Unit [] RCU []PCU [x]Floor [] Discharge Home           A/P:  acute traumatic  R lateral convexity 1.3 cm SDH extending into interhemispheric fissure while on aspirin and plavix       Neuro: neuro checks q 4 hr  CTH today stable  asa 81, cth today  keppra 500 mg QDx7  needs clearance for c collar, CT cervical spine FU  PT, OOB as tolerated    Respiratory:  RA  IS  has bilat pleur eff on CT chest, recent covid+  HD today    CV: CAD s/p stenting, on aspirin and plavix, hold off antiplts per NS due to risk of hematoma expansion   HF  EF 39 %   cont BB  SBP goal 100-160 mmhg   cont home amlodipine , lipitor ,and valsartan     Endocrine: finger sticks  achs  cont lantus 14 units ( at home is on 10 units)   ISS  endo following    HEME:  DVT ppx: chemoprophylaxis  resume if ok w nsgy  LED neg RUE dopplers given hand swelling that pt reports is gout    Renal: ESRD renal consult, T/th/sat  resume epoetin   possible HD edouard  resume flomax   gout on allopurinol at home    oral steroids for gout 1 dose, rheumatology consulted    ID: febrile fu cultures  send UA  COVID + but repeat neg , infection control consulted     GI: resume famotidine   CCD diet, ADAT  bowel regimen    Social/Family:   Discharge planning:     Code Status: [x] Full Code [] DNR [] DNI [] Goals of Care:   Disposition: [] ICU [] Stroke Unit [] RCU []PCU [x]Floor [] Discharge Home

## 2022-12-14 NOTE — PHYSICAL THERAPY INITIAL EVALUATION ADULT - LIVES WITH, PROFILE
Pt reports he lives with spouse in private home with 4 stairs with b/l HR to entrance, and 13 steps with bilateral HR inside. Prior to admission pt independent with all functional mobility including ambulation with RW or straight cane./spouse

## 2022-12-14 NOTE — PROGRESS NOTE ADULT - ASSESSMENT
62 year-old man with history of multiple medical issues including hypertension, diabetes mellitus, coronary artery diease, congestive heart failure with reduced EF, gout, and ESRD presents from PCP office for fall and is on a/c and has subdural hematoma       1 Nuerosurg- Off A/C at present;  Started on Keppra   2 Renal-HD in am   3 CVS- Will maximize fluid removal to aid with pleural effusions.  No need for HD today     Sayed [facility]   [phone]
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
## 2022-12-14 NOTE — PROGRESS NOTE ADULT - SUBJECTIVE AND OBJECTIVE BOX
Chief complaint    Patient is a 62y old  Male who presents with a chief complaint of fall (14 Dec 2022 14:23)   Review of systems  Patient in bed, appears comfortable.    Labs and Fingersticks  CAPILLARY BLOOD GLUCOSE      POCT Blood Glucose.: 359 mg/dL (14 Dec 2022 12:33)  POCT Blood Glucose.: 280 mg/dL (14 Dec 2022 08:44)  POCT Blood Glucose.: 247 mg/dL (13 Dec 2022 22:12)  POCT Blood Glucose.: 125 mg/dL (13 Dec 2022 16:05)      Anion Gap, Serum: 15 (12-13 @ 22:23)  Anion Gap, Serum: 17 (12-13 @ 03:03)  Anion Gap, Serum: 16 (12-12 @ 17:23)      Calcium, Total Serum: 8.1 *L* (12-13 @ 22:23)  Calcium, Total Serum: 8.8 (12-13 @ 03:03)  Calcium, Total Serum: 8.7 (12-12 @ 17:23)  Albumin, Serum: 3.6 (12-12 @ 17:23)    Alanine Aminotransferase (ALT/SGPT): 7 *L* (12-12 @ 17:23)  Alkaline Phosphatase, Serum: 105 (12-12 @ 17:23)  Aspartate Aminotransferase (AST/SGOT): <5 *L* (12-12 @ 17:23)        12-13    135  |  97  |  27<H>  ----------------------------<  269<H>  4.2   |  23  |  2.95<H>    Ca    8.1<L>      13 Dec 2022 22:23  Phos  4.0     12-13  Mg     1.9     12-13    TPro  6.9  /  Alb  3.6  /  TBili  0.6  /  DBili  x   /  AST  <5<L>  /  ALT  7<L>  /  AlkPhos  105  12-12                        9.0    11.04 )-----------( 203      ( 13 Dec 2022 22:23 )             29.5     Medications  MEDICATIONS  (STANDING):  allopurinol 100 milliGRAM(s) Oral daily  amLODIPine   Tablet 5 milliGRAM(s) Oral daily  aspirin  chewable 81 milliGRAM(s) Oral daily  atorvastatin 80 milliGRAM(s) Oral at bedtime  chlorhexidine 4% Liquid 1 Application(s) Topical daily  famotidine    Tablet 10 milliGRAM(s) Oral daily  insulin glargine Injectable (LANTUS) 18 Unit(s) SubCutaneous at bedtime  insulin lispro (ADMELOG) corrective regimen sliding scale   SubCutaneous Before meals and at bedtime  insulin lispro Injectable (ADMELOG) 8 Unit(s) SubCutaneous three times a day before meals  levETIRAcetam 500 milliGRAM(s) Oral at bedtime  lidocaine/prilocaine Cream 1 Application(s) Topical <User Schedule>  metoprolol tartrate 12.5 milliGRAM(s) Oral two times a day  polyethylene glycol 3350 17 Gram(s) Oral two times a day  senna 2 Tablet(s) Oral at bedtime  tamsulosin 0.4 milliGRAM(s) Oral at bedtime  valsartan 320 milliGRAM(s) Oral daily      Physical Exam  General: Patient appears comfortable.  Vital Signs Last 12 Hrs  T(F): 97.7 (12-14-22 @ 11:00), Max: 97.9 (12-14-22 @ 07:00)  HR: 81 (12-14-22 @ 14:00) (78 - 100)  BP: 94/49 (12-14-22 @ 14:00) (94/49 - 133/65)  BP(mean): 64 (12-14-22 @ 14:00) (64 - 84)  RR: 16 (12-14-22 @ 14:00) (14 - 30)  SpO2: 100% (12-14-22 @ 14:00) (94% - 100%)  Neck: No palpable thyroid nodules.  CVS: S1S2, No murmurs  Respiratory: No wheezing, no crepitations  GI: Abdomen soft, non tender.  Musculoskeletal:  edema lower extremities.     Diagnostics               Chief complaint  Patient is a 62y old  Male who presents with a chief complaint of fall (14 Dec 2022 14:23)   Review of systems  Patient in bed, appears comfortable.    Labs and Fingersticks  CAPILLARY BLOOD GLUCOSE      POCT Blood Glucose.: 359 mg/dL (14 Dec 2022 12:33)  POCT Blood Glucose.: 280 mg/dL (14 Dec 2022 08:44)  POCT Blood Glucose.: 247 mg/dL (13 Dec 2022 22:12)  POCT Blood Glucose.: 125 mg/dL (13 Dec 2022 16:05)      Anion Gap, Serum: 15 (12-13 @ 22:23)  Anion Gap, Serum: 17 (12-13 @ 03:03)  Anion Gap, Serum: 16 (12-12 @ 17:23)      Calcium, Total Serum: 8.1 *L* (12-13 @ 22:23)  Calcium, Total Serum: 8.8 (12-13 @ 03:03)  Calcium, Total Serum: 8.7 (12-12 @ 17:23)  Albumin, Serum: 3.6 (12-12 @ 17:23)    Alanine Aminotransferase (ALT/SGPT): 7 *L* (12-12 @ 17:23)  Alkaline Phosphatase, Serum: 105 (12-12 @ 17:23)  Aspartate Aminotransferase (AST/SGOT): <5 *L* (12-12 @ 17:23)        12-13    135  |  97  |  27<H>  ----------------------------<  269<H>  4.2   |  23  |  2.95<H>    Ca    8.1<L>      13 Dec 2022 22:23  Phos  4.0     12-13  Mg     1.9     12-13    TPro  6.9  /  Alb  3.6  /  TBili  0.6  /  DBili  x   /  AST  <5<L>  /  ALT  7<L>  /  AlkPhos  105  12-12                        9.0    11.04 )-----------( 203      ( 13 Dec 2022 22:23 )             29.5     Medications  MEDICATIONS  (STANDING):  allopurinol 100 milliGRAM(s) Oral daily  amLODIPine   Tablet 5 milliGRAM(s) Oral daily  aspirin  chewable 81 milliGRAM(s) Oral daily  atorvastatin 80 milliGRAM(s) Oral at bedtime  chlorhexidine 4% Liquid 1 Application(s) Topical daily  famotidine    Tablet 10 milliGRAM(s) Oral daily  insulin glargine Injectable (LANTUS) 18 Unit(s) SubCutaneous at bedtime  insulin lispro (ADMELOG) corrective regimen sliding scale   SubCutaneous Before meals and at bedtime  insulin lispro Injectable (ADMELOG) 8 Unit(s) SubCutaneous three times a day before meals  levETIRAcetam 500 milliGRAM(s) Oral at bedtime  lidocaine/prilocaine Cream 1 Application(s) Topical <User Schedule>  metoprolol tartrate 12.5 milliGRAM(s) Oral two times a day  polyethylene glycol 3350 17 Gram(s) Oral two times a day  senna 2 Tablet(s) Oral at bedtime  tamsulosin 0.4 milliGRAM(s) Oral at bedtime  valsartan 320 milliGRAM(s) Oral daily      Physical Exam  General: Patient appears comfortable.  Vital Signs Last 12 Hrs  T(F): 97.7 (12-14-22 @ 11:00), Max: 97.9 (12-14-22 @ 07:00)  HR: 81 (12-14-22 @ 14:00) (78 - 100)  BP: 94/49 (12-14-22 @ 14:00) (94/49 - 133/65)  BP(mean): 64 (12-14-22 @ 14:00) (64 - 84)  RR: 16 (12-14-22 @ 14:00) (14 - 30)  SpO2: 100% (12-14-22 @ 14:00) (94% - 100%)  Neck: No palpable thyroid nodules.  CVS: S1S2, No murmurs  Respiratory: No wheezing, no crepitations  GI: Abdomen soft, non tender.  Musculoskeletal:  edema lower extremities.     Diagnostics

## 2022-12-14 NOTE — CONSULT NOTE ADULT - ASSESSMENT
incomplete 61 yo M PMH gout (on allopurinol), CAD (recent stent 11/2022) on aspirin/plavix, insulin dependent DM w/ recent DKA, HTN, ESRD on dialysis M/W/F presenting w/ b/l knee pain s/p mechanical fall found to have R subdural hematoma which has been stable on serial imaging. Rheumatology consulted for potential gout flare.     #Gout flare:  =hx of chronic gout affecting finger, ankles, knees, shoulders w/ hyperuricemia. Last flare many months ago. Recent home prophylactic colchicine stopped after being discharged from [facility], wife not sure why it was stopped, or if paperwork error  =Pt w/ reported R hand and R knee swelling and pain s/p solumedrol 40 on 12/13/22  =[facility] CT 12/12/22 w/o fracture, x-ray b/l knee's unremarkable  =Pt says he could not bend R knee prior to steroid, but after receiving steroid w/ improvement in pain and ROM    Plan:  -obtain uric acid  -c/w allopurinol 100mg qd  -will decide on colchicine for gout prophylaxis depending on uric acid level  -would give quick taper: solumedrol 30mg (today), then 20mg, then 10mg and then off    -will follow     Discussed with Attending Dr. Jennifer Bejarano DO  Rheumatology Fellow  Pager: [phone]  Available on TEAMS

## 2022-12-14 NOTE — PROGRESS NOTE ADULT - ASSESSMENT
Assessment  DMT2: 62y Male with DM T2 with hyperglycemia admitted with on oral hypoglycemic agents at home, now on basal bolus and insulin coverage, blood sugars running high, no hypoglycemic episode,  eating meals,   Patient is high risk with high level decision making due to uncontrolled diabetes, has increased risk for complications. Tight sugar control is not recommended for this patient.  CAD: S/P CABG, on medications, monitored, asymptomatic.  HTN: On antihypertensive medications, monitored, asymptomatic.  Hypothyroidism: Subclinical, Newly diagnosed, Full thyroid panel not available, not on Synthroid  mcg po daily, compliant with Synthroid intake, asymptomatic.    ESRD CKD: On hemodialysis, labs, chart reviewed.  Overweight/Obesity: No strict exercise routines, neither on low calorie diet.                 Rubén Escoto MD  Cell:  [phone]  Office: [phone]               Assessment  DMT2: 62y Male with DM T2 with hyperglycemia admitted with on oral hypoglycemic agents at home, now on basal bolus and insulin coverage, blood sugars running high, no hypoglycemic episode,  eating meals,   SDH: Patient is stable, monitored remains in SICU.  HTN: On antihypertensive medications, monitored, asymptomatic.  CKD: On hemodialysis, labs, chart reviewed.                  Rubén Escoto MD  Cell:  [phone]  Office: [phone]

## 2022-12-15 LAB
GLUCOSE BLDC GLUCOMTR-MCNC: 106 MG/DL — HIGH (ref 70–99)
GLUCOSE BLDC GLUCOMTR-MCNC: 115 MG/DL — HIGH (ref 70–99)
GLUCOSE BLDC GLUCOMTR-MCNC: 143 MG/DL — HIGH (ref 70–99)
GLUCOSE BLDC GLUCOMTR-MCNC: 155 MG/DL — HIGH (ref 70–99)
GLUCOSE BLDC GLUCOMTR-MCNC: 254 MG/DL — HIGH (ref 70–99)

## 2022-12-15 PROCEDURE — 99233 SBSQ HOSP IP/OBS HIGH 50: CPT

## 2022-12-15 RX ORDER — INSULIN LISPRO 100/ML
VIAL (ML) SUBCUTANEOUS
Refills: 0 | Status: DISCONTINUED | OUTPATIENT
Start: 2022-12-15 | End: 2022-12-31

## 2022-12-15 RX ORDER — HEPARIN SODIUM 5000 [USP'U]/ML
5000 INJECTION INTRAVENOUS; SUBCUTANEOUS EVERY 8 HOURS
Refills: 0 | Status: DISCONTINUED | OUTPATIENT
Start: 2022-12-15 | End: 2022-12-31

## 2022-12-15 RX ORDER — INFLUENZA VIRUS VACCINE 15; 15; 15; 15 UG/.5ML; UG/.5ML; UG/.5ML; UG/.5ML
0.5 SUSPENSION INTRAMUSCULAR ONCE
Refills: 0 | Status: DISCONTINUED | OUTPATIENT
Start: 2022-12-15 | End: 2022-12-31

## 2022-12-15 RX ORDER — INSULIN LISPRO 100/ML
VIAL (ML) SUBCUTANEOUS
Refills: 0 | Status: DISCONTINUED | OUTPATIENT
Start: 2022-12-15 | End: 2022-12-15

## 2022-12-15 RX ORDER — INSULIN LISPRO 100/ML
12 VIAL (ML) SUBCUTANEOUS
Refills: 0 | Status: DISCONTINUED | OUTPATIENT
Start: 2022-12-15 | End: 2022-12-16

## 2022-12-15 RX ORDER — INSULIN GLARGINE 100 [IU]/ML
20 INJECTION, SOLUTION SUBCUTANEOUS AT BEDTIME
Refills: 0 | Status: DISCONTINUED | OUTPATIENT
Start: 2022-12-15 | End: 2022-12-16

## 2022-12-15 RX ORDER — INSULIN LISPRO 100/ML
12 VIAL (ML) SUBCUTANEOUS ONCE
Refills: 0 | Status: COMPLETED | OUTPATIENT
Start: 2022-12-15 | End: 2022-12-15

## 2022-12-15 RX ORDER — GABAPENTIN 400 MG/1
300 CAPSULE ORAL
Refills: 0 | Status: DISCONTINUED | OUTPATIENT
Start: 2022-12-15 | End: 2022-12-27

## 2022-12-15 RX ADMIN — Medication 650 MILLIGRAM(S): at 22:43

## 2022-12-15 RX ADMIN — AMLODIPINE BESYLATE 5 MILLIGRAM(S): 2.5 TABLET ORAL at 05:01

## 2022-12-15 RX ADMIN — HEPARIN SODIUM 5000 UNIT(S): 5000 INJECTION INTRAVENOUS; SUBCUTANEOUS at 13:55

## 2022-12-15 RX ADMIN — Medication 650 MILLIGRAM(S): at 14:40

## 2022-12-15 RX ADMIN — Medication 100 MILLIGRAM(S): at 11:14

## 2022-12-15 RX ADMIN — Medication 12 UNIT(S): at 17:16

## 2022-12-15 RX ADMIN — Medication 650 MILLIGRAM(S): at 14:08

## 2022-12-15 RX ADMIN — Medication 12 UNIT(S): at 08:27

## 2022-12-15 RX ADMIN — CHLORHEXIDINE GLUCONATE 1 APPLICATION(S): 213 SOLUTION TOPICAL at 21:43

## 2022-12-15 RX ADMIN — FAMOTIDINE 10 MILLIGRAM(S): 10 INJECTION INTRAVENOUS at 11:14

## 2022-12-15 RX ADMIN — LIDOCAINE AND PRILOCAINE CREAM 1 APPLICATION(S): 25; 25 CREAM TOPICAL at 13:25

## 2022-12-15 RX ADMIN — Medication 650 MILLIGRAM(S): at 05:57

## 2022-12-15 RX ADMIN — GABAPENTIN 300 MILLIGRAM(S): 400 CAPSULE ORAL at 17:16

## 2022-12-15 RX ADMIN — LEVETIRACETAM 500 MILLIGRAM(S): 250 TABLET, FILM COATED ORAL at 22:40

## 2022-12-15 RX ADMIN — TAMSULOSIN HYDROCHLORIDE 0.4 MILLIGRAM(S): 0.4 CAPSULE ORAL at 22:39

## 2022-12-15 RX ADMIN — Medication 650 MILLIGRAM(S): at 04:56

## 2022-12-15 RX ADMIN — INSULIN GLARGINE 20 UNIT(S): 100 INJECTION, SOLUTION SUBCUTANEOUS at 22:41

## 2022-12-15 RX ADMIN — Medication 12 UNIT(S): at 12:32

## 2022-12-15 RX ADMIN — Medication 12.5 MILLIGRAM(S): at 05:02

## 2022-12-15 RX ADMIN — Medication 12 UNIT(S): at 03:34

## 2022-12-15 RX ADMIN — HEPARIN SODIUM 5000 UNIT(S): 5000 INJECTION INTRAVENOUS; SUBCUTANEOUS at 22:40

## 2022-12-15 RX ADMIN — Medication 650 MILLIGRAM(S): at 23:27

## 2022-12-15 RX ADMIN — Medication 2: at 22:40

## 2022-12-15 RX ADMIN — Medication 12.5 MILLIGRAM(S): at 17:16

## 2022-12-15 RX ADMIN — ATORVASTATIN CALCIUM 80 MILLIGRAM(S): 80 TABLET, FILM COATED ORAL at 22:40

## 2022-12-15 RX ADMIN — Medication 81 MILLIGRAM(S): at 11:14

## 2022-12-15 NOTE — DIETITIAN INITIAL EVALUATION ADULT - ADD RECOMMEND
1. Continue consistent carbohydrate renal diet as prescribed. Defer consistency to medical team, SLP.   2. Encourage and monitor PO intake; encourage use of daily menus. Honor dietary preferences as expressed as able.   3. Diet education/review PRN, as per request of pt/family/medical team. Pt denied offer for additional edu at this time.   4. Consider obtaining updated A1c level (last obtained ~2 months ago).   5. Recommend Nepro Shakes 1x daily. Consider Nepro-Kelli supplement.  6. Monitor wt trends/labs/skin integrity/hydration status/bowel regularity.

## 2022-12-15 NOTE — DIETITIAN INITIAL EVALUATION ADULT - PERTINENT LABORATORY DATA
12-14    132<L>  |  97  |  62<H>  ----------------------------<  195<H>  4.7   |  14<L>  |  4.97<H>    Ca    8.2<L>      14 Dec 2022 22:23  Phos  5.1     12-14  Mg     2.5     12-14    POCT Blood Glucose.: 106 mg/dL (12-15-22 @ 07:55)  A1C with Estimated Average Glucose Result: 11.8 % (10-23-22 @ 14:08)

## 2022-12-15 NOTE — PROGRESS NOTE ADULT - SUBJECTIVE AND OBJECTIVE BOX
[facility] [phone]        Patient seen and examined in bed.   no distress        MEDICATIONS  (STANDING):  allopurinol 100 milliGRAM(s) Oral daily  amLODIPine   Tablet 5 milliGRAM(s) Oral daily  aspirin  chewable 81 milliGRAM(s) Oral daily  atorvastatin 80 milliGRAM(s) Oral at bedtime  chlorhexidine 4% Liquid 1 Application(s) Topical daily  dextrose 50% Injectable 25 Gram(s) IV Push once  dextrose 50% Injectable 12.5 Gram(s) IV Push once  famotidine    Tablet 10 milliGRAM(s) Oral daily  glucagon  Injectable 1 milliGRAM(s) IntraMuscular once  insulin glargine Injectable (LANTUS) 14 Unit(s) SubCutaneous at bedtime  insulin lispro (ADMELOG) corrective regimen sliding scale   SubCutaneous Before meals and at bedtime  insulin lispro Injectable (ADMELOG) 6 Unit(s) SubCutaneous three times a day before meals  levETIRAcetam 500 milliGRAM(s) Oral at bedtime  lidocaine/prilocaine Cream 1 Application(s) Topical <User Schedule>  metoprolol tartrate 12.5 milliGRAM(s) Oral two times a day  tamsulosin 0.4 milliGRAM(s) Oral at bedtime  valsartan 320 milliGRAM(s) Oral daily      VITAL:  T(C): , Max: 38.2 (22 @ 11:00)  T(F): , Max: 100.8 (22 @ 11:00)  HR: 88 (22 @ 09:00)  BP: 123/57 (22 @ 09:00)  BP(mean): 77 (22 @ 09:00)  RR: 18 (22 @ 09:00)  SpO2: 97% (22 @ 09:00)  Wt(kg): --    I and O's:     @ 07:01  -   @ 07:00  --------------------------------------------------------  IN: 300 mL / OUT: 2800 mL / NET: -2500 mL     @ 07:01  -   @ 09:46  --------------------------------------------------------  IN: 240 mL / OUT: 0 mL / NET: 240 mL          PHYSICAL EXAM:    Constitutional: NAD  Neck:  No JVD  Respiratory: CTAB/L  Cardiovascular: S1 and S2  Gastrointestinal: BS+, soft, NT/ND  Extremities: No peripheral edema  Neurological: A/O x 3, no focal deficits  Psychiatric: Normal mood, normal affect  : No Jay  Skin: No rashes  Access: Fistula     LABS:                        9.0    11.04 )-----------( 203      ( 13 Dec 2022 22:23 )             29.5     12    135  |  97  |  27<H>  ----------------------------<  269<H>  4.2   |  23  |  2.95<H>    Ca    8.1<L>      13 Dec 2022 22:23  Phos  4.0     -  Mg     1.9     -    TPro  6.9  /  Alb  3.6  /  TBili  0.6  /  DBili  x   /  AST  <5<L>  /  ALT  7<L>  /  AlkPhos  105  12-12          Urine Studies:  Urinalysis Basic - ( 13 Dec 2022 10:55 )    Color: Light Yellow / Appearance: Clear / S.018 / pH: x  Gluc: x / Ketone: Trace  / Bili: Negative / Urobili: Negative   Blood: x / Protein: 300 mg/dL / Nitrite: Negative   Leuk Esterase: Negative / RBC: 2 /hpf / WBC 1 /HPF   Sq Epi: x / Non Sq Epi: 3 /hpf / Bacteria: Negative            RADIOLOGY & ADDITIONAL STUDIES:      < from: CT Head No Cont (22 @ 08:28) >    ACC: [id number] EXAM:  CT BRAIN                          PROCEDURE DATE:  2022          INTERPRETATION:  Noncontrast CT of the brain.    CLINICAL INDICATION:  Follow-up right lateral convexity subdural   collection    TECHNIQUE : Axial CT scanning of the brain was obtained from the skull   base to the vertex without the administration of intravenous contrast.   Sagittal and coronal reformats were provided.    COMPARISON: CT brain 2022    FINDINGS:    Similar mixed density right lateral convexity subdural collection   measures 1.2 cm in greatest depth.    No parenchymal hemorrhage or brain edema.    No midline shift or effacement of basal cisterns. No hydrocephalus.    IMPRESSION:    No significant interval change.    Similar mixeddensity right lateral convexity subdural collection   measures 1.2 cm in greatest depth.    --- End of Report ---            STACI BOOTH MD; Attending Radiologist  This document has been electronically signed. Dec 13 2022    < end of copied text >         [facility] [phone]        Patient seen and examined in bed.   no distress, seen on dialysis        MEDICATIONS  (STANDING):  allopurinol 100 milliGRAM(s) Oral daily  amLODIPine   Tablet 5 milliGRAM(s) Oral daily  aspirin  chewable 81 milliGRAM(s) Oral daily  atorvastatin 80 milliGRAM(s) Oral at bedtime  chlorhexidine 4% Liquid 1 Application(s) Topical daily  dextrose 50% Injectable 25 Gram(s) IV Push once  dextrose 50% Injectable 12.5 Gram(s) IV Push once  famotidine    Tablet 10 milliGRAM(s) Oral daily  glucagon  Injectable 1 milliGRAM(s) IntraMuscular once  insulin glargine Injectable (LANTUS) 14 Unit(s) SubCutaneous at bedtime  insulin lispro (ADMELOG) corrective regimen sliding scale   SubCutaneous Before meals and at bedtime  insulin lispro Injectable (ADMELOG) 6 Unit(s) SubCutaneous three times a day before meals  levETIRAcetam 500 milliGRAM(s) Oral at bedtime  lidocaine/prilocaine Cream 1 Application(s) Topical <User Schedule>  metoprolol tartrate 12.5 milliGRAM(s) Oral two times a day  tamsulosin 0.4 milliGRAM(s) Oral at bedtime  valsartan 320 milliGRAM(s) Oral daily      VITAL:  T(C): , Max: 38.2 (22 @ 11:00)  T(F): , Max: 100.8 (22 @ 11:00)  HR: 88 (22 @ 09:00)  BP: 123/57 (22 @ 09:00)  BP(mean): 77 (22 @ 09:00)  RR: 18 (22 @ 09:00)  SpO2: 97% (22 @ 09:00)  Wt(kg): --    I and O's:     @ 07:01  -   @ 07:00  --------------------------------------------------------  IN: 300 mL / OUT: 2800 mL / NET: -2500 mL     @ 07:01   @ 09:46  --------------------------------------------------------  IN: 240 mL / OUT: 0 mL / NET: 240 mL          PHYSICAL EXAM:    Constitutional: NAD  Neck:  No JVD  Respiratory: CTAB/L  Cardiovascular: S1 and S2  Gastrointestinal: BS+, soft, NT/ND  Extremities: No peripheral edema  Neurological: A/O x 3, no focal deficits  Psychiatric: Normal mood, normal affect  : No Jay  Skin: No rashes  Access: Fistula     LABS:                        9.0    11.04 )-----------( 203      ( 13 Dec 2022 22:23 )             29.5     12    135  |  97  |  27<H>  ----------------------------<  269<H>  4.2   |  23  |  2.95<H>    Ca    8.1<L>      13 Dec 2022 22:23  Phos  4.0     12-  Mg     1.9     -    TPro  6.9  /  Alb  3.6  /  TBili  0.6  /  DBili  x   /  AST  <5<L>  /  ALT  7<L>  /  AlkPhos  105  12-12          Urine Studies:  Urinalysis Basic - ( 13 Dec 2022 10:55 )    Color: Light Yellow / Appearance: Clear / S.018 / pH: x  Gluc: x / Ketone: Trace  / Bili: Negative / Urobili: Negative   Blood: x / Protein: 300 mg/dL / Nitrite: Negative   Leuk Esterase: Negative / RBC: 2 /hpf / WBC 1 /HPF   Sq Epi: x / Non Sq Epi: 3 /hpf / Bacteria: Negative            RADIOLOGY & ADDITIONAL STUDIES:      < from: CT Head No Cont (22 @ 08:28) >    ACC: [id number] EXAM:  CT BRAIN                          PROCEDURE DATE:  2022          INTERPRETATION:  Noncontrast CT of the brain.    CLINICAL INDICATION:  Follow-up right lateral convexity subdural   collection    TECHNIQUE : Axial CT scanning of the brain was obtained from the skull   base to the vertex without the administration of intravenous contrast.   Sagittal and coronal reformats were provided.    COMPARISON: CT brain 2022    FINDINGS:    Similar mixed density right lateral convexity subdural collection   measures 1.2 cm in greatest depth.    No parenchymal hemorrhage or brain edema.    No midline shift or effacement of basal cisterns. No hydrocephalus.    IMPRESSION:    No significant interval change.    Similar mixeddensity right lateral convexity subdural collection   measures 1.2 cm in greatest depth.    --- End of Report ---            STACI BOOTH MD; Attending Radiologist  This document has been electronically signed. Dec 13 2022    < end of copied text >

## 2022-12-15 NOTE — PROGRESS NOTE ADULT - ASSESSMENT
Assessment  DMT2: 62y Male with DM T2 with hyperglycemia admitted with on oral hypoglycemic agents at home, now on basal bolus and insulin coverage, blood sugars fluctuating, no hypoglycemic episode, moved to CCU floor, eating meals,   SDH: Patient is stable, monitored remains in SICU.  HTN: On antihypertensive medications, monitored, asymptomatic.  CKD: On hemodialysis, labs, chart reviewed.                  Rubén Escoto MD  Cell:  [phone]  Office: [phone]

## 2022-12-15 NOTE — DIETITIAN INITIAL EVALUATION ADULT - SIGNS/SYMPTOMS
began HD x 3 mo ago; unable to recall foods that contain phos/K; A1c 11.8%  s/p SDH; pt receiving hemodialysis to aid in renal function
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
## 2022-12-15 NOTE — PROGRESS NOTE ADULT - ASSESSMENT
62 year-old man with history of multiple medical issues including hypertension, diabetes mellitus, coronary artery diease, congestive heart failure with reduced EF, gout, and ESRD presents from PCP office for fall and is on a/c and has subdural hematoma       1 Nuerosurg- Off A/C at present;  Started on Keppra   2 Renal-HD today  3 CVS- Will maximize fluid removal to aid with pleural effusions.  No need for HD today             Viviendelaney Montana  [facility]  Office: [phone]     62 year-old man with history of multiple medical issues including hypertension, diabetes mellitus, coronary artery diease, congestive heart failure with reduced EF, gout, and ESRD presents from PCP office for fall and is on a/c and has subdural hematoma       1 Nuerosurg- Off A/C at present;  Started on Keppra   2 Renal-HD today , next HD on Saturday , then will resume MWF schedule .   3 CVS- Will maximize fluid removal to aid with pleural effusions.  No need for HD today             Vivien Montana  [facility]  Office: [phone]

## 2022-12-15 NOTE — DIETITIAN INITIAL EVALUATION ADULT - PERTINENT MEDS FT
MEDICATIONS  (STANDING):  allopurinol 100 milliGRAM(s) Oral daily  amLODIPine   Tablet 5 milliGRAM(s) Oral daily  aspirin  chewable 81 milliGRAM(s) Oral daily  atorvastatin 80 milliGRAM(s) Oral at bedtime  chlorhexidine 4% Liquid 1 Application(s) Topical daily  famotidine    Tablet 10 milliGRAM(s) Oral daily  influenza   Vaccine 0.5 milliLiter(s) IntraMuscular once  insulin glargine Injectable (LANTUS) 20 Unit(s) SubCutaneous at bedtime  insulin lispro (ADMELOG) corrective regimen sliding scale   SubCutaneous three times a day before meals  insulin lispro Injectable (ADMELOG) 12 Unit(s) SubCutaneous three times a day before meals  levETIRAcetam 500 milliGRAM(s) Oral at bedtime  lidocaine/prilocaine Cream 1 Application(s) Topical <User Schedule>  metoprolol tartrate 12.5 milliGRAM(s) Oral two times a day  polyethylene glycol 3350 17 Gram(s) Oral two times a day  senna 2 Tablet(s) Oral at bedtime  tamsulosin 0.4 milliGRAM(s) Oral at bedtime  valsartan 320 milliGRAM(s) Oral daily    MEDICATIONS  (PRN):  acetaminophen     Tablet .. 650 milliGRAM(s) Oral every 6 hours PRN Temp greater or equal to 38C (100.4F), Mild Pain (1 - 3)
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
## 2022-12-15 NOTE — DIETITIAN INITIAL EVALUATION ADULT - ORAL INTAKE PTA/DIET HISTORY
Pt reports appetite "alright" PTA; consumed on average 2-3 meals daily. Recently began HD x 3 mo ago, and has adjusted diet (eating less overall, decreased sugar/salt intake, decreased quantity of water) in setting of same. Previously consumed no therapeutic restrictions, however admitted that now he is more aware of importance of adhering to appropriate diet. Admits he has received information re: nutrition therapy for HD from RD at HD center. Currently denies offer for additional review in-house, states "I have a lot of information at home". In setting of DM (A1c 11.8% in 10/2022), pt reports checking FSBG 4x daily and takes insulin (insulin lispro sliding scale, insulin glargine). Limits shellfish/seafood, red meat in setting of Gout. Denies in tolerance to chewing/swallowing. Denies N/V/C/diarrhea PTA. Reports taking vitamin D supplement PTA.

## 2022-12-15 NOTE — DIETITIAN INITIAL EVALUATION ADULT - NSFNSGIIOFT_GEN_A_CORE
-Last BM (12/14). On bowel regimen (senna, Miralax).   -Continues on insulin lispro sliding scale, Admelog 12u 3x daily and Lantus 20u at bedtime to aid in management of BG.   -Last HD (12/13), net negative -2.5L. Pending HD today. Nephrology following.
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
## 2022-12-15 NOTE — PROGRESS NOTE ADULT - SUBJECTIVE AND OBJECTIVE BOX
Chief complaint    Patient is a 62y old  Male who presents with a chief complaint of fall (15 Dec 2022 09:25)   Review of systems  Patient in bed, appears comfortable.    Labs and Fingersticks  CAPILLARY BLOOD GLUCOSE      POCT Blood Glucose.: 106 mg/dL (15 Dec 2022 07:55)  POCT Blood Glucose.: 254 mg/dL (15 Dec 2022 02:55)  POCT Blood Glucose.: 193 mg/dL (14 Dec 2022 20:55)  POCT Blood Glucose.: 273 mg/dL (14 Dec 2022 17:13)  POCT Blood Glucose.: 359 mg/dL (14 Dec 2022 12:33)      Anion Gap, Serum: 21 *H* (12-14 @ 22:23)  Anion Gap, Serum: 15 (12-13 @ 22:23)      Calcium, Total Serum: 8.2 *L* (12-14 @ 22:23)  Calcium, Total Serum: 8.1 *L* (12-13 @ 22:23)          12-14    132<L>  |  97  |  62<H>  ----------------------------<  195<H>  4.7   |  14<L>  |  4.97<H>    Ca    8.2<L>      14 Dec 2022 22:23  Phos  5.1     12-14  Mg     2.5     12-14                          9.3    11.19 )-----------( 199      ( 14 Dec 2022 22:23 )             30.9     Medications  MEDICATIONS  (STANDING):  allopurinol 100 milliGRAM(s) Oral daily  amLODIPine   Tablet 5 milliGRAM(s) Oral daily  aspirin  chewable 81 milliGRAM(s) Oral daily  atorvastatin 80 milliGRAM(s) Oral at bedtime  chlorhexidine 4% Liquid 1 Application(s) Topical daily  famotidine    Tablet 10 milliGRAM(s) Oral daily  heparin   Injectable 5000 Unit(s) SubCutaneous every 8 hours  influenza   Vaccine 0.5 milliLiter(s) IntraMuscular once  insulin glargine Injectable (LANTUS) 20 Unit(s) SubCutaneous at bedtime  insulin lispro (ADMELOG) corrective regimen sliding scale   SubCutaneous Before meals and at bedtime  insulin lispro Injectable (ADMELOG) 12 Unit(s) SubCutaneous three times a day before meals  levETIRAcetam 500 milliGRAM(s) Oral at bedtime  lidocaine/prilocaine Cream 1 Application(s) Topical <User Schedule>  metoprolol tartrate 12.5 milliGRAM(s) Oral two times a day  polyethylene glycol 3350 17 Gram(s) Oral two times a day  senna 2 Tablet(s) Oral at bedtime  tamsulosin 0.4 milliGRAM(s) Oral at bedtime  valsartan 320 milliGRAM(s) Oral daily      Physical Exam  General: Patient appears comfortable.  Vital Signs Last 12 Hrs  T(F): 98.4 (12-15-22 @ 07:00), Max: 98.4 (12-15-22 @ 04:30)  HR: 87 (12-15-22 @ 09:00) (77 - 90)  BP: 96/51 (12-15-22 @ 09:00) (96/51 - 125/66)  BP(mean): 69 (12-15-22 @ 09:00) (69 - 89)  RR: 16 (12-15-22 @ 09:00) (15 - 25)  SpO2: 99% (12-15-22 @ 09:00) (91% - 99%)  Neck: No palpable thyroid nodules.  CVS: S1S2, No murmurs  Respiratory: No wheezing, no crepitations  GI: Abdomen soft, non tender.  Musculoskeletal:  edema lower extremities.     Diagnostics
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
## 2022-12-15 NOTE — DIETITIAN INITIAL EVALUATION ADULT - ENERGY INTAKE
Pt educated on menu/menu ordering procedures. Reports he has been eating fair-good in-house thus far./Fair (50-75%)

## 2022-12-15 NOTE — PROGRESS NOTE ADULT - TIME BILLING
reviewing pertinent data, performing physical examination and formulating a plan.
Reviewing pertinent data, physical examination, formulating plan and counseling patient/family.

## 2022-12-15 NOTE — DIETITIAN INITIAL EVALUATION ADULT - NSPROEDAABILITYLEARN_GEN_A_NUR
briefly discussed DM (pt able to recall foods that contribute to increased BG); pt denied need for additional information re: HD and therapeutic diet at this time. RD to remain available./none

## 2022-12-15 NOTE — DIETITIAN INITIAL EVALUATION ADULT - OTHER INFO
Dosing wt (12/12): 179.5 lbs  Pt reports UBW ~178 lbs - appears consistent.  Wt trend (per [facility]): (10/22): 199 lbs; (8/22) 190 lbs; (2/14): 187 lbs.   Pt reports wt loss in setting of fluid shifts 2/2 HD, diet changes. Will continue to monitor/trend.

## 2022-12-15 NOTE — PROGRESS NOTE ADULT - SUBJECTIVE AND OBJECTIVE BOX
afberile/ in  a chair  REVIEW OF SYSTEMS:  GEN: no fever,    no chills  RESP: no SOB,   no cough  CVS: no chest pain,   no palpitations  GI: no abdominal pain,   no nausea,   no vomiting,   no constipation,   no diarrhea  : no dysuria,   no frequency  NEURO: no headache,   no dizziness  PSYCH: no depression,   not anxious  Derm : no rash    MEDICATIONS  (STANDING):  allopurinol 100 milliGRAM(s) Oral daily  amLODIPine   Tablet 5 milliGRAM(s) Oral daily  aspirin  chewable 81 milliGRAM(s) Oral daily  atorvastatin 80 milliGRAM(s) Oral at bedtime  chlorhexidine 4% Liquid 1 Application(s) Topical daily  famotidine    Tablet 10 milliGRAM(s) Oral daily  heparin   Injectable 5000 Unit(s) SubCutaneous every 8 hours  influenza   Vaccine 0.5 milliLiter(s) IntraMuscular once  insulin glargine Injectable (LANTUS) 20 Unit(s) SubCutaneous at bedtime  insulin lispro (ADMELOG) corrective regimen sliding scale   SubCutaneous Before meals and at bedtime  insulin lispro Injectable (ADMELOG) 12 Unit(s) SubCutaneous three times a day before meals  levETIRAcetam 500 milliGRAM(s) Oral at bedtime  lidocaine/prilocaine Cream 1 Application(s) Topical <User Schedule>  metoprolol tartrate 12.5 milliGRAM(s) Oral two times a day  polyethylene glycol 3350 17 Gram(s) Oral two times a day  senna 2 Tablet(s) Oral at bedtime  tamsulosin 0.4 milliGRAM(s) Oral at bedtime  valsartan 320 milliGRAM(s) Oral daily    MEDICATIONS  (PRN):  acetaminophen     Tablet .. 650 milliGRAM(s) Oral every 6 hours PRN Temp greater or equal to 38C (100.4F), Mild Pain (1 - 3)      Vital Signs Last 24 Hrs  T(C): 36.9 (15 Dec 2022 07:00), Max: 36.9 (15 Dec 2022 04:30)  T(F): 98.4 (15 Dec 2022 07:00), Max: 98.4 (15 Dec 2022 04:30)  HR: 87 (15 Dec 2022 09:00) (77 - 90)  BP: 96/51 (15 Dec 2022 09:00) (94/49 - 125/66)  BP(mean): 69 (15 Dec 2022 09:00) (64 - 89)  RR: 16 (15 Dec 2022 09:) (14 - 25)  SpO2: 99% (15 Dec 2022 09:) (91% - 100%)    Parameters below as of 15 Dec 2022 09:00  Patient On (Oxygen Delivery Method): room air      CAPILLARY BLOOD GLUCOSE      POCT Blood Glucose.: 106 mg/dL (15 Dec 2022 07:55)  POCT Blood Glucose.: 254 mg/dL (15 Dec 2022 02:55)  POCT Blood Glucose.: 193 mg/dL (14 Dec 2022 20:55)  POCT Blood Glucose.: 273 mg/dL (14 Dec 2022 17:13)  POCT Blood Glucose.: 359 mg/dL (14 Dec 2022 12:33)    I&O's Summary    14 Dec 2022 07:01  -  15 Dec 2022 07:00  --------------------------------------------------------  IN: 760 mL / OUT: 120 mL / NET: 640 mL    15 Dec 2022 07:01  -  15 Dec 2022 10:12  --------------------------------------------------------  IN: 120 mL / OUT: 0 mL / NET: 120 mL        PHYSICAL EXAM:  HEAD:  Atraumatic, Normocephalic  NECK: Supple, No   JVD  CHEST/LUNG:   no     rales,     no,    rhonchi  HEART: Regular rate and rhythm;         murmur  ABDOMEN: Soft, Nontender, ;   EXTREMITIES:     less   edema  NEUROLOGY:  alert    LABS:                        9.3    11.19 )-----------( 199      ( 14 Dec 2022 22:23 )             30.9     12-14    132<L>  |  97  |  62<H>  ----------------------------<  195<H>  4.7   |  14<L>  |  4.97<H>    Ca    8.2<L>      14 Dec 2022 22:23  Phos  5.1     12-14  Mg     2.5     12-14        CARDIAC MARKERS ( 13 Dec 2022 10:53 )  x     / x     / 173 U/L / x     / x          Urinalysis Basic - ( 13 Dec 2022 10:55 )    Color: Light Yellow / Appearance: Clear / S.018 / pH: x  Gluc: x / Ketone: Trace  / Bili: Negative / Urobili: Negative   Blood: x / Protein: 300 mg/dL / Nitrite: Negative   Leuk Esterase: Negative / RBC: 2 /hpf / WBC 1 /HPF   Sq Epi: x / Non Sq Epi: 3 /hpf / Bacteria: Negative                      Consultant(s) Notes Reviewed:      Care Discussed with Consultants/Other Providers:

## 2022-12-16 LAB
GLUCOSE BLDC GLUCOMTR-MCNC: 108 MG/DL — HIGH (ref 70–99)
GLUCOSE BLDC GLUCOMTR-MCNC: 109 MG/DL — HIGH (ref 70–99)
GLUCOSE BLDC GLUCOMTR-MCNC: 133 MG/DL — HIGH (ref 70–99)
GLUCOSE BLDC GLUCOMTR-MCNC: 172 MG/DL — HIGH (ref 70–99)
GLUCOSE BLDC GLUCOMTR-MCNC: 67 MG/DL — LOW (ref 70–99)
GLUCOSE BLDC GLUCOMTR-MCNC: 68 MG/DL — LOW (ref 70–99)
GLUCOSE BLDC GLUCOMTR-MCNC: 84 MG/DL — SIGNIFICANT CHANGE UP (ref 70–99)

## 2022-12-16 PROCEDURE — 73721 MRI JNT OF LWR EXTRE W/O DYE: CPT | Mod: 26,RT

## 2022-12-16 PROCEDURE — 99231 SBSQ HOSP IP/OBS SF/LOW 25: CPT | Mod: GC

## 2022-12-16 RX ORDER — CARVEDILOL PHOSPHATE 80 MG/1
12.5 CAPSULE, EXTENDED RELEASE ORAL EVERY 12 HOURS
Refills: 0 | Status: DISCONTINUED | OUTPATIENT
Start: 2022-12-16 | End: 2022-12-31

## 2022-12-16 RX ORDER — VALSARTAN 80 MG/1
160 TABLET ORAL DAILY
Refills: 0 | Status: DISCONTINUED | OUTPATIENT
Start: 2022-12-16 | End: 2022-12-20

## 2022-12-16 RX ORDER — INSULIN GLARGINE 100 [IU]/ML
14 INJECTION, SOLUTION SUBCUTANEOUS AT BEDTIME
Refills: 0 | Status: DISCONTINUED | OUTPATIENT
Start: 2022-12-16 | End: 2022-12-19

## 2022-12-16 RX ORDER — INSULIN LISPRO 100/ML
7 VIAL (ML) SUBCUTANEOUS
Refills: 0 | Status: DISCONTINUED | OUTPATIENT
Start: 2022-12-16 | End: 2022-12-17

## 2022-12-16 RX ADMIN — Medication 81 MILLIGRAM(S): at 12:30

## 2022-12-16 RX ADMIN — Medication 7 UNIT(S): at 18:22

## 2022-12-16 RX ADMIN — Medication 100 MILLIGRAM(S): at 12:30

## 2022-12-16 RX ADMIN — TAMSULOSIN HYDROCHLORIDE 0.4 MILLIGRAM(S): 0.4 CAPSULE ORAL at 21:12

## 2022-12-16 RX ADMIN — CARVEDILOL PHOSPHATE 12.5 MILLIGRAM(S): 80 CAPSULE, EXTENDED RELEASE ORAL at 18:23

## 2022-12-16 RX ADMIN — POLYETHYLENE GLYCOL 3350 17 GRAM(S): 17 POWDER, FOR SOLUTION ORAL at 18:21

## 2022-12-16 RX ADMIN — AMLODIPINE BESYLATE 5 MILLIGRAM(S): 2.5 TABLET ORAL at 06:31

## 2022-12-16 RX ADMIN — GABAPENTIN 300 MILLIGRAM(S): 400 CAPSULE ORAL at 18:24

## 2022-12-16 RX ADMIN — INSULIN GLARGINE 14 UNIT(S): 100 INJECTION, SOLUTION SUBCUTANEOUS at 23:54

## 2022-12-16 RX ADMIN — ATORVASTATIN CALCIUM 80 MILLIGRAM(S): 80 TABLET, FILM COATED ORAL at 21:12

## 2022-12-16 RX ADMIN — HEPARIN SODIUM 5000 UNIT(S): 5000 INJECTION INTRAVENOUS; SUBCUTANEOUS at 21:13

## 2022-12-16 RX ADMIN — LEVETIRACETAM 500 MILLIGRAM(S): 250 TABLET, FILM COATED ORAL at 21:13

## 2022-12-16 RX ADMIN — SENNA PLUS 2 TABLET(S): 8.6 TABLET ORAL at 21:13

## 2022-12-16 RX ADMIN — Medication 12.5 MILLIGRAM(S): at 06:32

## 2022-12-16 RX ADMIN — HEPARIN SODIUM 5000 UNIT(S): 5000 INJECTION INTRAVENOUS; SUBCUTANEOUS at 06:32

## 2022-12-16 RX ADMIN — Medication 2: at 23:57

## 2022-12-16 RX ADMIN — CHLORHEXIDINE GLUCONATE 1 APPLICATION(S): 213 SOLUTION TOPICAL at 12:31

## 2022-12-16 RX ADMIN — GABAPENTIN 300 MILLIGRAM(S): 400 CAPSULE ORAL at 06:31

## 2022-12-16 RX ADMIN — VALSARTAN 320 MILLIGRAM(S): 80 TABLET ORAL at 06:31

## 2022-12-16 RX ADMIN — FAMOTIDINE 10 MILLIGRAM(S): 10 INJECTION INTRAVENOUS at 12:31

## 2022-12-16 RX ADMIN — Medication 12 UNIT(S): at 09:34

## 2022-12-16 RX ADMIN — HEPARIN SODIUM 5000 UNIT(S): 5000 INJECTION INTRAVENOUS; SUBCUTANEOUS at 14:17

## 2022-12-16 NOTE — PROGRESS NOTE ADULT - ASSESSMENT
62 year-old man with history of multiple medical issues including hypertension, diabetes mellitus, coronary artery diease, congestive heart failure with reduced EF, gout, and ESRD presents from PCP office for fall and is on a/c and has subdural hematoma .      1 Nuerosurg- Off A/C at present;  Started on Keppra   2 Renal-HD tomorrow  ., then will resume MWF schedule .   3 CVS- Will maximize fluid removal to aid with pleural effusions.  No need for HD today             Vivien Montana  [facility]  Office: [phone]
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
## 2022-12-16 NOTE — PROGRESS NOTE ADULT - SUBJECTIVE AND OBJECTIVE BOX
Chief complaint    Patient is a 62y old  Male who presents with a chief complaint of fall (16 Dec 2022 14:17)   Review of systems  Patient in bed, appears comfortable.    Labs and Fingersticks  CAPILLARY BLOOD GLUCOSE      POCT Blood Glucose.: 84 mg/dL (16 Dec 2022 14:03)  POCT Blood Glucose.: 68 mg/dL (16 Dec 2022 13:07)  POCT Blood Glucose.: 67 mg/dL (16 Dec 2022 13:04)  POCT Blood Glucose.: 133 mg/dL (16 Dec 2022 08:41)  POCT Blood Glucose.: 155 mg/dL (15 Dec 2022 21:36)  POCT Blood Glucose.: 143 mg/dL (15 Dec 2022 17:13)      Anion Gap, Serum: 21 *H* (12-14 @ 22:23)      Calcium, Total Serum: 8.2 *L* (12-14 @ 22:23)          12-14    132<L>  |  97  |  62<H>  ----------------------------<  195<H>  4.7   |  14<L>  |  4.97<H>    Ca    8.2<L>      14 Dec 2022 22:23  Phos  5.1     12-14  Mg     2.5     12-14                          9.3    11.19 )-----------( 199      ( 14 Dec 2022 22:23 )             30.9     Medications  MEDICATIONS  (STANDING):  allopurinol 100 milliGRAM(s) Oral daily  amLODIPine   Tablet 5 milliGRAM(s) Oral daily  aspirin  chewable 81 milliGRAM(s) Oral daily  atorvastatin 80 milliGRAM(s) Oral at bedtime  carvedilol 12.5 milliGRAM(s) Oral every 12 hours  chlorhexidine 4% Liquid 1 Application(s) Topical daily  famotidine    Tablet 10 milliGRAM(s) Oral daily  gabapentin 300 milliGRAM(s) Oral two times a day  heparin   Injectable 5000 Unit(s) SubCutaneous every 8 hours  influenza   Vaccine 0.5 milliLiter(s) IntraMuscular once  insulin glargine Injectable (LANTUS) 14 Unit(s) SubCutaneous at bedtime  insulin lispro (ADMELOG) corrective regimen sliding scale   SubCutaneous Before meals and at bedtime  insulin lispro Injectable (ADMELOG) 7 Unit(s) SubCutaneous three times a day before meals  levETIRAcetam 500 milliGRAM(s) Oral at bedtime  lidocaine/prilocaine Cream 1 Application(s) Topical <User Schedule>  polyethylene glycol 3350 17 Gram(s) Oral two times a day  senna 2 Tablet(s) Oral at bedtime  tamsulosin 0.4 milliGRAM(s) Oral at bedtime  valsartan 160 milliGRAM(s) Oral daily      Physical Exam  General: Patient appears comfortable.  Vital Signs Last 12 Hrs  T(F): 98.6 (12-16-22 @ 04:45), Max: 98.6 (12-16-22 @ 04:45)  HR: 100 (12-16-22 @ 04:45) (100 - 100)  BP: 144/79 (12-16-22 @ 04:45) (144/79 - 144/79)  BP(mean): --  RR: 18 (12-16-22 @ 04:45) (18 - 18)  SpO2: 95% (12-16-22 @ 04:45) (95% - 95%)  Neck: No palpable thyroid nodules.  CVS: S1S2, No murmurs  Respiratory: No wheezing, no crepitations  GI: Abdomen soft, non tender.  Musculoskeletal:  edema lower extremities.     Diagnostics

## 2022-12-16 NOTE — PROGRESS NOTE ADULT - SUBJECTIVE AND OBJECTIVE BOX
afberile    REVIEW OF SYSTEMS:  GEN: no fever,    no chills  RESP: no SOB,   no cough  CVS: no chest pain,   no palpitations  GI: no abdominal pain,   no nausea,   no vomiting,   no constipation,   no diarrhea  : no dysuria,   no frequency  NEURO: no headache,   no dizziness  PSYCH: no depression,   not anxious  Derm : no rash    MEDICATIONS  (STANDING):  allopurinol 100 milliGRAM(s) Oral daily  amLODIPine   Tablet 5 milliGRAM(s) Oral daily  aspirin  chewable 81 milliGRAM(s) Oral daily  atorvastatin 80 milliGRAM(s) Oral at bedtime  carvedilol 12.5 milliGRAM(s) Oral every 12 hours  chlorhexidine 4% Liquid 1 Application(s) Topical daily  famotidine    Tablet 10 milliGRAM(s) Oral daily  gabapentin 300 milliGRAM(s) Oral two times a day  heparin   Injectable 5000 Unit(s) SubCutaneous every 8 hours  influenza   Vaccine 0.5 milliLiter(s) IntraMuscular once  insulin glargine Injectable (LANTUS) 20 Unit(s) SubCutaneous at bedtime  insulin lispro (ADMELOG) corrective regimen sliding scale   SubCutaneous Before meals and at bedtime  insulin lispro Injectable (ADMELOG) 12 Unit(s) SubCutaneous three times a day before meals  levETIRAcetam 500 milliGRAM(s) Oral at bedtime  lidocaine/prilocaine Cream 1 Application(s) Topical <User Schedule>  polyethylene glycol 3350 17 Gram(s) Oral two times a day  senna 2 Tablet(s) Oral at bedtime  tamsulosin 0.4 milliGRAM(s) Oral at bedtime  valsartan 160 milliGRAM(s) Oral daily    MEDICATIONS  (PRN):  acetaminophen     Tablet .. 650 milliGRAM(s) Oral every 6 hours PRN Temp greater or equal to 38C (100.4F), Mild Pain (1 - 3)      Vital Signs Last 24 Hrs  T(C): 37 (16 Dec 2022 04:45), Max: 37 (15 Dec 2022 11:00)  T(F): 98.6 (16 Dec 2022 04:45), Max: 98.6 (15 Dec 2022 11:00)  HR: 100 (16 Dec 2022 04:45) (80 - 100)  BP: 144/79 (16 Dec 2022 04:45) (97/54 - 144/79)  BP(mean): 88 (15 Dec 2022 19:00) (73 - 90)  RR: 18 (16 Dec 2022 04:45) (15 - 22)  SpO2: 95% (16 Dec 2022 04:45) (95% - 100%)    Parameters below as of 16 Dec 2022 04:45  Patient On (Oxygen Delivery Method): room air      CAPILLARY BLOOD GLUCOSE      POCT Blood Glucose.: 133 mg/dL (16 Dec 2022 08:41)  POCT Blood Glucose.: 155 mg/dL (15 Dec 2022 21:36)  POCT Blood Glucose.: 143 mg/dL (15 Dec 2022 17:13)  POCT Blood Glucose.: 115 mg/dL (15 Dec 2022 11:55)    I&O's Summary    15 Dec 2022 07:01  -  16 Dec 2022 07:00  --------------------------------------------------------  IN: 360 mL / OUT: 2500 mL / NET: -2140 mL        PHYSICAL EXAM:  HEAD:  Atraumatic, Normocephalic  NECK: Supple, No   JVD  CHEST/LUNG:   no     rales,     no,    rhonchi  HEART: Regular rate and rhythm;         murmur  ABDOMEN: Soft, Nontender, ;   EXTREMITIES:        edema  NEUROLOGY:  alert    LABS:                        9.3    11.19 )-----------( 199      ( 14 Dec 2022 22:23 )             30.9     12-14    132<L>  |  97  |  62<H>  ----------------------------<  195<H>  4.7   |  14<L>  |  4.97<H>    Ca    8.2<L>      14 Dec 2022 22:23  Phos  5.1     12-14  Mg     2.5     12-14                              Consultant(s) Notes Reviewed:      Care Discussed with Consultants/Other Providers:

## 2022-12-16 NOTE — PROGRESS NOTE ADULT - ASSESSMENT
recommend R knee MRI for further information of R knee pain - does not appear to be gout 63 yo M PMH gout (on allopurinol), CAD (recent stent 11/2022) on aspirin/plavix, insulin dependent DM w/ recent DKA, HTN, ESRD on dialysis M/W/F presenting w/ b/l knee pain s/p mechanical fall found to have R subdural hematoma which has been stable on serial imaging. Rheumatology consulted for potential gout flare.     #Gout flare:  =hx of chronic gout affecting finger, ankles, knees, shoulders w/ hyperuricemia. Last flare many months ago. Recent home prophylactic colchicine stopped after being discharged from [facility], wife not sure why it was stopped, or if paperwork error  =Pt w/ reported R hand and R knee swelling and pain s/p solumedrol 40 x1 on 12/13/22  =[facility] CT 12/12/22 w/o fracture, x-ray b/l knee's unremarkable  =Pt says he could not bend R knee prior to steroid, but after receiving steroid w/ improvement in pain and ROM  =did not receive further steroid treatment since 12/13/22 per primary team  =12/16/22, pt w/ R knee pain again, however exam is not consistent w/ gout    Plan:  -recommend R knee MRI for further information of R knee pain - does not appear to be gout flare  -obtain uric acid  -c/w allopurinol 100mg qd  -will decide on colchicine for gout prophylaxis depending on uric acid level   -will follow     Discussed with Attending Dr. Jennifer Bejarano DO  Rheumatology Fellow  Pager: [phone]  Available on TEAMS

## 2022-12-16 NOTE — PROGRESS NOTE ADULT - ASSESSMENT
Assessment  DMT2: 62y Male with DM T2 with hyperglycemia admitted with on oral hypoglycemic agents at home, now on basal bolus and insulin coverage, blood sugars are trending down, had hypoglycemic episode, moved to medical floor, eating meals,   SDH: Patient is stable, monitored remains in SICU.  HTN: On antihypertensive medications, monitored, asymptomatic.  CKD: On hemodialysis, labs, chart reviewed.                  Rubén Escoto MD  Cell:  [phone]  Office: [phone]
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
## 2022-12-16 NOTE — PROGRESS NOTE ADULT - SUBJECTIVE AND OBJECTIVE BOX
NEPHROLOGY-[facility] [phone]        Patient seen and examined in bed.   no distress, seen on dialysis        MEDICATIONS  (STANDING):  allopurinol 100 milliGRAM(s) Oral daily  amLODIPine   Tablet 5 milliGRAM(s) Oral daily  aspirin  chewable 81 milliGRAM(s) Oral daily  atorvastatin 80 milliGRAM(s) Oral at bedtime  chlorhexidine 4% Liquid 1 Application(s) Topical daily  dextrose 50% Injectable 25 Gram(s) IV Push once  dextrose 50% Injectable 12.5 Gram(s) IV Push once  famotidine    Tablet 10 milliGRAM(s) Oral daily  glucagon  Injectable 1 milliGRAM(s) IntraMuscular once  insulin glargine Injectable (LANTUS) 14 Unit(s) SubCutaneous at bedtime  insulin lispro (ADMELOG) corrective regimen sliding scale   SubCutaneous Before meals and at bedtime  insulin lispro Injectable (ADMELOG) 6 Unit(s) SubCutaneous three times a day before meals  levETIRAcetam 500 milliGRAM(s) Oral at bedtime  lidocaine/prilocaine Cream 1 Application(s) Topical <User Schedule>  metoprolol tartrate 12.5 milliGRAM(s) Oral two times a day  tamsulosin 0.4 milliGRAM(s) Oral at bedtime  valsartan 320 milliGRAM(s) Oral daily      VITAL:  T(C): , Max: 38.2 (22 @ 11:00)  T(F): , Max: 100.8 (22 @ 11:00)  HR: 88 (22 @ 09:00)  BP: 123/57 (22 @ 09:00)  BP(mean): 77 (22 @ 09:00)  RR: 18 (22 @ 09:00)  SpO2: 97% (22 @ 09:00)  Wt(kg): --    I and O's:     @ 07:01  -   @ 07:00  --------------------------------------------------------  IN: 300 mL / OUT: 2800 mL / NET: -2500 mL     @ 07:01   @ 09:46  --------------------------------------------------------  IN: 240 mL / OUT: 0 mL / NET: 240 mL          PHYSICAL EXAM:    Constitutional: NAD  Neck:  No JVD  Respiratory: CTAB/L  Cardiovascular: S1 and S2  Gastrointestinal: BS+, soft, NT/ND  Extremities: No peripheral edema  Neurological: A/O x 3, no focal deficits  Psychiatric: Normal mood, normal affect  : No Jay  Skin: No rashes  Access: Fistula     LABS:                        9.0    11.04 )-----------( 203      ( 13 Dec 2022 22:23 )             29.5     12    135  |  97  |  27<H>  ----------------------------<  269<H>  4.2   |  23  |  2.95<H>    Ca    8.1<L>      13 Dec 2022 22:23  Phos  4.0     12-  Mg     1.9     -    TPro  6.9  /  Alb  3.6  /  TBili  0.6  /  DBili  x   /  AST  <5<L>  /  ALT  7<L>  /  AlkPhos  105  12-12          Urine Studies:  Urinalysis Basic - ( 13 Dec 2022 10:55 )    Color: Light Yellow / Appearance: Clear / S.018 / pH: x  Gluc: x / Ketone: Trace  / Bili: Negative / Urobili: Negative   Blood: x / Protein: 300 mg/dL / Nitrite: Negative   Leuk Esterase: Negative / RBC: 2 /hpf / WBC 1 /HPF   Sq Epi: x / Non Sq Epi: 3 /hpf / Bacteria: Negative            RADIOLOGY & ADDITIONAL STUDIES:      < from: CT Head No Cont (22 @ 08:28) >    ACC: [id number] EXAM:  CT BRAIN                          PROCEDURE DATE:  2022          INTERPRETATION:  Noncontrast CT of the brain.    CLINICAL INDICATION:  Follow-up right lateral convexity subdural   collection    TECHNIQUE : Axial CT scanning of the brain was obtained from the skull   base to the vertex without the administration of intravenous contrast.   Sagittal and coronal reformats were provided.    COMPARISON: CT brain 2022    FINDINGS:    Similar mixed density right lateral convexity subdural collection   measures 1.2 cm in greatest depth.    No parenchymal hemorrhage or brain edema.    No midline shift or effacement of basal cisterns. No hydrocephalus.    IMPRESSION:    No significant interval change.    Similar mixeddensity right lateral convexity subdural collection   measures 1.2 cm in greatest depth.    --- End of Report ---            STACI BOOTH MD; Attending Radiologist  This document has been electronically signed. Dec 13 2022    < end of copied text >

## 2022-12-16 NOTE — PROGRESS NOTE ADULT - SUBJECTIVE AND OBJECTIVE BOX
Patient seen and examined at bedside.    --Anticoagulation--  aspirin  chewable 81 milliGRAM(s) Oral daily  heparin   Injectable 5000 Unit(s) SubCutaneous every 8 hours    T(C): 37 (12-16-22 @ 04:45), Max: 37 (12-15-22 @ 11:00)  HR: 100 (12-16-22 @ 04:45) (77 - 100)  BP: 144/79 (12-16-22 @ 04:45) (96/51 - 144/79)  RR: 18 (12-16-22 @ 04:45) (15 - 22)  SpO2: 95% (12-16-22 @ 04:45) (91% - 100%)  Wt(kg): --    Exam: Ox3, FC, left facial, Speech Fluent, RUE drift. RUE 5/5 except HG (pain), LUE 5/5,  RLE 4+/5 pain limited, LLE 5/5

## 2022-12-16 NOTE — PROGRESS NOTE ADULT - PROBLEM SELECTOR PLAN 1
Will decrease Lantus to 14 units at bed time.  Will decrease Admelog to 7 units before each meal in addition to Admelog correction scale coverage.  Patient counseled for compliance with consistent low carb diet.  .
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
## 2022-12-16 NOTE — PROGRESS NOTE ADULT - SUBJECTIVE AND OBJECTIVE BOX
incomplete ADAM KOWALSKI  [id number]    INTERVAL HPI/OVERNIGHT EVENTS: Pt says he still has R knee pain. Unable to extend it w/o pain. Denies fever, chills, chest pain, sob.    MEDICATIONS  (STANDING):  allopurinol 100 milliGRAM(s) Oral daily  amLODIPine   Tablet 5 milliGRAM(s) Oral daily  aspirin  chewable 81 milliGRAM(s) Oral daily  atorvastatin 80 milliGRAM(s) Oral at bedtime  carvedilol 12.5 milliGRAM(s) Oral every 12 hours  chlorhexidine 4% Liquid 1 Application(s) Topical daily  famotidine    Tablet 10 milliGRAM(s) Oral daily  gabapentin 300 milliGRAM(s) Oral two times a day  heparin   Injectable 5000 Unit(s) SubCutaneous every 8 hours  influenza   Vaccine 0.5 milliLiter(s) IntraMuscular once  insulin glargine Injectable (LANTUS) 14 Unit(s) SubCutaneous at bedtime  insulin lispro (ADMELOG) corrective regimen sliding scale   SubCutaneous Before meals and at bedtime  insulin lispro Injectable (ADMELOG) 7 Unit(s) SubCutaneous three times a day before meals  levETIRAcetam 500 milliGRAM(s) Oral at bedtime  lidocaine/prilocaine Cream 1 Application(s) Topical <User Schedule>  polyethylene glycol 3350 17 Gram(s) Oral two times a day  senna 2 Tablet(s) Oral at bedtime  tamsulosin 0.4 milliGRAM(s) Oral at bedtime  valsartan 160 milliGRAM(s) Oral daily    MEDICATIONS  (PRN):  acetaminophen     Tablet .. 650 milliGRAM(s) Oral every 6 hours PRN Temp greater or equal to 38C (100.4F), Mild Pain (1 - 3)      Allergies    No Known Drug Allergies  Seafood (Unknown)  shellfish (Unknown)    Intolerances        Review of Systems: As above    Vital Signs Last 24 Hrs  T(C): 37.4 (16 Dec 2022 12:54), Max: 37.4 (16 Dec 2022 12:54)  T(F): 99.4 (16 Dec 2022 12:54), Max: 99.4 (16 Dec 2022 12:54)  HR: 84 (16 Dec 2022 12:54) (82 - 100)  BP: 99/64 (16 Dec 2022 12:54) (99/64 - 144/79)  BP(mean): 88 (15 Dec 2022 19:00) (88 - 88)  RR: 18 (16 Dec 2022 12:54) (18 - 22)  SpO2: 95% (16 Dec 2022 12:54) (95% - 98%)    Parameters below as of 16 Dec 2022 04:45  Patient On (Oxygen Delivery Method): room air        Physical Exam:  General: Breathing comfortably, no distress  HEENT: EOMI, MMM, no oral ulcers  CVS: +S1/S2, RRR  Resp: CTA b/l. No crackles/wheezing  GI: Soft, NT/ND +BS  MSK: no synovitis. R knee w/o effusion or erythema. Decreased ROM in R knee 2/2 pain. Similar warmth in both knees.   Skin: no visible rashes. No tophi      LABS:                        9.3    11.19 )-----------( 199      ( 14 Dec 2022 22:23 )             30.9     12-14    132<L>  |  97  |  62<H>  ----------------------------<  195<H>  4.7   |  14<L>  |  4.97<H>    Ca    8.2<L>      14 Dec 2022 22:23  Phos  5.1     12-14  Mg     2.5     12-14

## 2022-12-17 LAB
ANION GAP SERPL CALC-SCNC: 16 MMOL/L — SIGNIFICANT CHANGE UP (ref 5–17)
BUN SERPL-MCNC: 63 MG/DL — HIGH (ref 7–23)
CALCIUM SERPL-MCNC: 8.2 MG/DL — LOW (ref 8.4–10.5)
CHLORIDE SERPL-SCNC: 98 MMOL/L — SIGNIFICANT CHANGE UP (ref 96–108)
CO2 SERPL-SCNC: 23 MMOL/L — SIGNIFICANT CHANGE UP (ref 22–31)
CREAT SERPL-MCNC: 5.98 MG/DL — HIGH (ref 0.5–1.3)
EGFR: 10 ML/MIN/1.73M2 — LOW
GLUCOSE BLDC GLUCOMTR-MCNC: 105 MG/DL — HIGH (ref 70–99)
GLUCOSE BLDC GLUCOMTR-MCNC: 107 MG/DL — HIGH (ref 70–99)
GLUCOSE BLDC GLUCOMTR-MCNC: 116 MG/DL — HIGH (ref 70–99)
GLUCOSE BLDC GLUCOMTR-MCNC: 157 MG/DL — HIGH (ref 70–99)
GLUCOSE SERPL-MCNC: 110 MG/DL — HIGH (ref 70–99)
MAGNESIUM SERPL-MCNC: 2.2 MG/DL — SIGNIFICANT CHANGE UP (ref 1.6–2.6)
POTASSIUM SERPL-MCNC: 3.9 MMOL/L — SIGNIFICANT CHANGE UP (ref 3.5–5.3)
POTASSIUM SERPL-SCNC: 3.9 MMOL/L — SIGNIFICANT CHANGE UP (ref 3.5–5.3)
SODIUM SERPL-SCNC: 137 MMOL/L — SIGNIFICANT CHANGE UP (ref 135–145)
URATE SERPL-MCNC: 7 MG/DL — SIGNIFICANT CHANGE UP (ref 3.4–8.8)

## 2022-12-17 PROCEDURE — 70450 CT HEAD/BRAIN W/O DYE: CPT | Mod: 26

## 2022-12-17 RX ORDER — INSULIN LISPRO 100/ML
5 VIAL (ML) SUBCUTANEOUS
Refills: 0 | Status: DISCONTINUED | OUTPATIENT
Start: 2022-12-17 | End: 2022-12-19

## 2022-12-17 RX ADMIN — Medication 81 MILLIGRAM(S): at 21:06

## 2022-12-17 RX ADMIN — HEPARIN SODIUM 5000 UNIT(S): 5000 INJECTION INTRAVENOUS; SUBCUTANEOUS at 21:05

## 2022-12-17 RX ADMIN — VALSARTAN 160 MILLIGRAM(S): 80 TABLET ORAL at 05:10

## 2022-12-17 RX ADMIN — Medication 100 MILLIGRAM(S): at 21:06

## 2022-12-17 RX ADMIN — CHLORHEXIDINE GLUCONATE 1 APPLICATION(S): 213 SOLUTION TOPICAL at 10:47

## 2022-12-17 RX ADMIN — POLYETHYLENE GLYCOL 3350 17 GRAM(S): 17 POWDER, FOR SOLUTION ORAL at 21:06

## 2022-12-17 RX ADMIN — GABAPENTIN 300 MILLIGRAM(S): 400 CAPSULE ORAL at 05:09

## 2022-12-17 RX ADMIN — FAMOTIDINE 10 MILLIGRAM(S): 10 INJECTION INTRAVENOUS at 21:06

## 2022-12-17 RX ADMIN — SENNA PLUS 2 TABLET(S): 8.6 TABLET ORAL at 21:07

## 2022-12-17 RX ADMIN — Medication 5 UNIT(S): at 09:20

## 2022-12-17 RX ADMIN — CARVEDILOL PHOSPHATE 12.5 MILLIGRAM(S): 80 CAPSULE, EXTENDED RELEASE ORAL at 21:05

## 2022-12-17 RX ADMIN — TAMSULOSIN HYDROCHLORIDE 0.4 MILLIGRAM(S): 0.4 CAPSULE ORAL at 21:07

## 2022-12-17 RX ADMIN — ATORVASTATIN CALCIUM 80 MILLIGRAM(S): 80 TABLET, FILM COATED ORAL at 21:05

## 2022-12-17 RX ADMIN — INSULIN GLARGINE 14 UNIT(S): 100 INJECTION, SOLUTION SUBCUTANEOUS at 21:04

## 2022-12-17 RX ADMIN — LEVETIRACETAM 500 MILLIGRAM(S): 250 TABLET, FILM COATED ORAL at 21:07

## 2022-12-17 RX ADMIN — AMLODIPINE BESYLATE 5 MILLIGRAM(S): 2.5 TABLET ORAL at 05:10

## 2022-12-17 RX ADMIN — CARVEDILOL PHOSPHATE 12.5 MILLIGRAM(S): 80 CAPSULE, EXTENDED RELEASE ORAL at 05:10

## 2022-12-17 RX ADMIN — HEPARIN SODIUM 5000 UNIT(S): 5000 INJECTION INTRAVENOUS; SUBCUTANEOUS at 05:10
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
## 2022-12-17 NOTE — PROGRESS NOTE ADULT - ASSESSMENT
Assessment  DMT2: 62y Male with DM T2 with hyperglycemia admitted with on oral hypoglycemic agents at home, on basal bolus and insulin coverage, blood sugars are trending further down, insulin dose adjusted, eating partial meals,   SDH: Patient is stable, monitored remains in SICU.  HTN: On antihypertensive medications, monitored, asymptomatic.  CKD: On hemodialysis, labs, chart reviewed.                  Rubén Escoto MD  Cell:  [phone]  Office: [phone]
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
## 2022-12-17 NOTE — PROGRESS NOTE ADULT - SUBJECTIVE AND OBJECTIVE BOX
Patient seen and examined resting in bed.  He is alert but does appear tired today.    REVIEW OF SYSTEMS:  As per HPI, otherwise 8 full 10 ROS were unremarkable    MEDICATIONS  (STANDING):  allopurinol 100 milliGRAM(s) Oral daily  amLODIPine   Tablet 5 milliGRAM(s) Oral daily  aspirin  chewable 81 milliGRAM(s) Oral daily  atorvastatin 80 milliGRAM(s) Oral at bedtime  carvedilol 12.5 milliGRAM(s) Oral every 12 hours  chlorhexidine 4% Liquid 1 Application(s) Topical daily  famotidine    Tablet 10 milliGRAM(s) Oral daily  gabapentin 300 milliGRAM(s) Oral two times a day  heparin   Injectable 5000 Unit(s) SubCutaneous every 8 hours  influenza   Vaccine 0.5 milliLiter(s) IntraMuscular once  insulin glargine Injectable (LANTUS) 14 Unit(s) SubCutaneous at bedtime  insulin lispro (ADMELOG) corrective regimen sliding scale   SubCutaneous Before meals and at bedtime  insulin lispro Injectable (ADMELOG) 5 Unit(s) SubCutaneous three times a day before meals  levETIRAcetam 500 milliGRAM(s) Oral at bedtime  lidocaine/prilocaine Cream 1 Application(s) Topical <User Schedule>  polyethylene glycol 3350 17 Gram(s) Oral two times a day  senna 2 Tablet(s) Oral at bedtime  tamsulosin 0.4 milliGRAM(s) Oral at bedtime  valsartan 160 milliGRAM(s) Oral daily      VITAL:  T(C): , Max: 37.4 (12-17-22 @ 11:24)  T(F): , Max: 99.3 (12-17-22 @ 11:24)  HR: 83 (12-17-22 @ 11:24)  BP: 107/64 (12-17-22 @ 11:24)  BP(mean): 78 (12-17-22 @ 11:24)  RR: 18 (12-17-22 @ 11:24)  SpO2: 94% (12-17-22 @ 11:24)  Wt(kg): --    I and O's:    12-16 @ 07:01  -  12-17 @ 07:00  --------------------------------------------------------  IN: 480 mL / OUT: 0 mL / NET: 480 mL          PHYSICAL EXAM:    Constitutional: NAD  HEENT: PERRLA, EOMI,  MMM  Neck: No LAD, No JVD  Respiratory: CTAB  Cardiovascular: S1 and S2  Gastrointestinal: BS+, soft, NT/ND  Extremities: No peripheral edema  Neurological: A/O x 3, no focal deficits  Psychiatric: Normal mood, normal affect  : No Jay  Skin: No rashes  Access: +AVF +thrill    LABS:    12-17    137  |  98  |  63<H>  ----------------------------<  110<H>  3.9   |  23  |  5.98<H>    Ca    8.2<L>      17 Dec 2022 07:39  Mg     2.2     12-17        Assessment and Plan:   · Assessment	  62 year-old man with history of multiple medical issues including hypertension, diabetes mellitus, coronary artery diease, congestive heart failure with reduced EF, gout, and ESRD presents from PCP office for fall and is on a/c and has subdural hematoma .      1 Nuerosurg- Off A/C at present;  Started on Keppra   2 Renal-Plan for HD today then will resume MWF schedule .  Lytes acceptable  3 CVS- Will maximize fluid removal to aid with pleural effusions.   BPs acceptable

## 2022-12-17 NOTE — PROGRESS NOTE ADULT - SUBJECTIVE AND OBJECTIVE BOX
afberile  REVIEW OF SYSTEMS:  GEN: no fever,    no chills  RESP: no SOB,   no cough  CVS: no chest pain,   no palpitations  GI: no abdominal pain,   no nausea,   no vomiting,   no constipation,   no diarrhea  : no dysuria,   no frequency  NEURO: no headache,   no dizziness  PSYCH: no depression,   not anxious  Derm : no rash    MEDICATIONS  (STANDING):  allopurinol 100 milliGRAM(s) Oral daily  amLODIPine   Tablet 5 milliGRAM(s) Oral daily  aspirin  chewable 81 milliGRAM(s) Oral daily  atorvastatin 80 milliGRAM(s) Oral at bedtime  carvedilol 12.5 milliGRAM(s) Oral every 12 hours  chlorhexidine 4% Liquid 1 Application(s) Topical daily  famotidine    Tablet 10 milliGRAM(s) Oral daily  gabapentin 300 milliGRAM(s) Oral two times a day  heparin   Injectable 5000 Unit(s) SubCutaneous every 8 hours  influenza   Vaccine 0.5 milliLiter(s) IntraMuscular once  insulin glargine Injectable (LANTUS) 14 Unit(s) SubCutaneous at bedtime  insulin lispro (ADMELOG) corrective regimen sliding scale   SubCutaneous Before meals and at bedtime  insulin lispro Injectable (ADMELOG) 5 Unit(s) SubCutaneous three times a day before meals  levETIRAcetam 500 milliGRAM(s) Oral at bedtime  lidocaine/prilocaine Cream 1 Application(s) Topical <User Schedule>  polyethylene glycol 3350 17 Gram(s) Oral two times a day  senna 2 Tablet(s) Oral at bedtime  tamsulosin 0.4 milliGRAM(s) Oral at bedtime  valsartan 160 milliGRAM(s) Oral daily    MEDICATIONS  (PRN):  acetaminophen     Tablet .. 650 milliGRAM(s) Oral every 6 hours PRN Temp greater or equal to 38C (100.4F), Mild Pain (1 - 3)      Vital Signs Last 24 Hrs  T(C): 36.7 (17 Dec 2022 04:42), Max: 37.4 (16 Dec 2022 12:54)  T(F): 98 (17 Dec 2022 04:42), Max: 99.4 (16 Dec 2022 12:54)  HR: 88 (17 Dec 2022 04:42) (84 - 88)  BP: 138/81 (17 Dec 2022 04:42) (99/64 - 138/81)  BP(mean): --  RR: 18 (17 Dec 2022 04:42) (18 - 18)  SpO2: 95% (17 Dec 2022 04:42) (95% - 95%)    Parameters below as of 17 Dec 2022 04:42  Patient On (Oxygen Delivery Method): room air      CAPILLARY BLOOD GLUCOSE      POCT Blood Glucose.: 116 mg/dL (17 Dec 2022 08:28)  POCT Blood Glucose.: 172 mg/dL (16 Dec 2022 23:50)  POCT Blood Glucose.: 109 mg/dL (16 Dec 2022 18:03)  POCT Blood Glucose.: 108 mg/dL (16 Dec 2022 14:42)  POCT Blood Glucose.: 84 mg/dL (16 Dec 2022 14:03)  POCT Blood Glucose.: 68 mg/dL (16 Dec 2022 13:07)  POCT Blood Glucose.: 67 mg/dL (16 Dec 2022 13:04)    I&O's Summary    16 Dec 2022 07:01  -  17 Dec 2022 07:00  --------------------------------------------------------  IN: 480 mL / OUT: 0 mL / NET: 480 mL        PHYSICAL EXAM:  HEAD:  Atraumatic, Normocephalic  NECK: Supple, No   JVD  CHEST/LUNG:   no     rales,     no,    rhonchi  HEART: Regular rate and rhythm;         murmur  ABDOMEN: Soft, Nontender, ;   EXTREMITIES:    less    edema  NEUROLOGY:  alert    LABS:    12-17    137  |  98  |  63<H>  ----------------------------<  110<H>  3.9   |  23  |  5.98<H>    Ca    8.2<L>      17 Dec 2022 07:39                              Consultant(s) Notes Reviewed:      Care Discussed with Consultants/Other Providers:

## 2022-12-17 NOTE — PROGRESS NOTE ADULT - SUBJECTIVE AND OBJECTIVE BOX
Chief complaint    Patient is a 62y old  Male who presents with a chief complaint of fall (17 Dec 2022 09:38)   Review of systems  Patient in bed, appears comfortable.    Labs and Fingersticks  CAPILLARY BLOOD GLUCOSE      POCT Blood Glucose.: 116 mg/dL (17 Dec 2022 08:28)  POCT Blood Glucose.: 172 mg/dL (16 Dec 2022 23:50)  POCT Blood Glucose.: 109 mg/dL (16 Dec 2022 18:03)  POCT Blood Glucose.: 108 mg/dL (16 Dec 2022 14:42)  POCT Blood Glucose.: 84 mg/dL (16 Dec 2022 14:03)  POCT Blood Glucose.: 68 mg/dL (16 Dec 2022 13:07)  POCT Blood Glucose.: 67 mg/dL (16 Dec 2022 13:04)      Anion Gap, Serum: 16 (12-17 @ 07:39)      Calcium, Total Serum: 8.2 *L* (12-17 @ 07:39)          12-17    137  |  98  |  63<H>  ----------------------------<  110<H>  3.9   |  23  |  5.98<H>    Ca    8.2<L>      17 Dec 2022 07:39      Medications  MEDICATIONS  (STANDING):  allopurinol 100 milliGRAM(s) Oral daily  amLODIPine   Tablet 5 milliGRAM(s) Oral daily  aspirin  chewable 81 milliGRAM(s) Oral daily  atorvastatin 80 milliGRAM(s) Oral at bedtime  carvedilol 12.5 milliGRAM(s) Oral every 12 hours  chlorhexidine 4% Liquid 1 Application(s) Topical daily  famotidine    Tablet 10 milliGRAM(s) Oral daily  gabapentin 300 milliGRAM(s) Oral two times a day  heparin   Injectable 5000 Unit(s) SubCutaneous every 8 hours  influenza   Vaccine 0.5 milliLiter(s) IntraMuscular once  insulin glargine Injectable (LANTUS) 14 Unit(s) SubCutaneous at bedtime  insulin lispro (ADMELOG) corrective regimen sliding scale   SubCutaneous Before meals and at bedtime  insulin lispro Injectable (ADMELOG) 5 Unit(s) SubCutaneous three times a day before meals  levETIRAcetam 500 milliGRAM(s) Oral at bedtime  lidocaine/prilocaine Cream 1 Application(s) Topical <User Schedule>  polyethylene glycol 3350 17 Gram(s) Oral two times a day  senna 2 Tablet(s) Oral at bedtime  tamsulosin 0.4 milliGRAM(s) Oral at bedtime  valsartan 160 milliGRAM(s) Oral daily      Physical Exam  General: Patient appears comfortable.  Vital Signs Last 12 Hrs  T(F): 99 (12-17-22 @ 09:25), Max: 99 (12-17-22 @ 09:25)  HR: 83 (12-17-22 @ 09:25) (83 - 88)  BP: 142/87 (12-17-22 @ 09:25) (138/81 - 142/87)  BP(mean): --  RR: 18 (12-17-22 @ 09:25) (18 - 18)  SpO2: 95% (12-17-22 @ 09:25) (95% - 95%)  Neck: No palpable thyroid nodules.  CVS: S1S2, No murmurs  Respiratory: No wheezing, no crepitations  GI: Abdomen soft, non tender.  Musculoskeletal:  edema lower extremities.     Diagnostics

## 2022-12-18 LAB
ANION GAP SERPL CALC-SCNC: 16 MMOL/L — SIGNIFICANT CHANGE UP (ref 5–17)
BUN SERPL-MCNC: 45 MG/DL — HIGH (ref 7–23)
CALCIUM SERPL-MCNC: 8.7 MG/DL — SIGNIFICANT CHANGE UP (ref 8.4–10.5)
CHLORIDE SERPL-SCNC: 95 MMOL/L — LOW (ref 96–108)
CO2 SERPL-SCNC: 26 MMOL/L — SIGNIFICANT CHANGE UP (ref 22–31)
CREAT SERPL-MCNC: 4.71 MG/DL — HIGH (ref 0.5–1.3)
CULTURE RESULTS: SIGNIFICANT CHANGE UP
CULTURE RESULTS: SIGNIFICANT CHANGE UP
EGFR: 13 ML/MIN/1.73M2 — LOW
GLUCOSE BLDC GLUCOMTR-MCNC: 147 MG/DL — HIGH (ref 70–99)
GLUCOSE BLDC GLUCOMTR-MCNC: 183 MG/DL — HIGH (ref 70–99)
GLUCOSE BLDC GLUCOMTR-MCNC: 230 MG/DL — HIGH (ref 70–99)
GLUCOSE BLDC GLUCOMTR-MCNC: 236 MG/DL — HIGH (ref 70–99)
GLUCOSE SERPL-MCNC: 150 MG/DL — HIGH (ref 70–99)
MAGNESIUM SERPL-MCNC: 2.2 MG/DL — SIGNIFICANT CHANGE UP (ref 1.6–2.6)
POTASSIUM SERPL-MCNC: 4 MMOL/L — SIGNIFICANT CHANGE UP (ref 3.5–5.3)
POTASSIUM SERPL-SCNC: 4 MMOL/L — SIGNIFICANT CHANGE UP (ref 3.5–5.3)
SODIUM SERPL-SCNC: 137 MMOL/L — SIGNIFICANT CHANGE UP (ref 135–145)
SPECIMEN SOURCE: SIGNIFICANT CHANGE UP
SPECIMEN SOURCE: SIGNIFICANT CHANGE UP

## 2022-12-18 RX ADMIN — Medication 5 UNIT(S): at 13:05

## 2022-12-18 RX ADMIN — AMLODIPINE BESYLATE 5 MILLIGRAM(S): 2.5 TABLET ORAL at 06:41

## 2022-12-18 RX ADMIN — HEPARIN SODIUM 5000 UNIT(S): 5000 INJECTION INTRAVENOUS; SUBCUTANEOUS at 22:12

## 2022-12-18 RX ADMIN — CARVEDILOL PHOSPHATE 12.5 MILLIGRAM(S): 80 CAPSULE, EXTENDED RELEASE ORAL at 18:05

## 2022-12-18 RX ADMIN — ATORVASTATIN CALCIUM 80 MILLIGRAM(S): 80 TABLET, FILM COATED ORAL at 22:12

## 2022-12-18 RX ADMIN — Medication 2: at 13:20

## 2022-12-18 RX ADMIN — VALSARTAN 160 MILLIGRAM(S): 80 TABLET ORAL at 06:41

## 2022-12-18 RX ADMIN — HEPARIN SODIUM 5000 UNIT(S): 5000 INJECTION INTRAVENOUS; SUBCUTANEOUS at 06:42

## 2022-12-18 RX ADMIN — TAMSULOSIN HYDROCHLORIDE 0.4 MILLIGRAM(S): 0.4 CAPSULE ORAL at 22:13

## 2022-12-18 RX ADMIN — SENNA PLUS 2 TABLET(S): 8.6 TABLET ORAL at 22:13

## 2022-12-18 RX ADMIN — CHLORHEXIDINE GLUCONATE 1 APPLICATION(S): 213 SOLUTION TOPICAL at 12:00

## 2022-12-18 RX ADMIN — CARVEDILOL PHOSPHATE 12.5 MILLIGRAM(S): 80 CAPSULE, EXTENDED RELEASE ORAL at 06:41

## 2022-12-18 RX ADMIN — GABAPENTIN 300 MILLIGRAM(S): 400 CAPSULE ORAL at 17:59

## 2022-12-18 RX ADMIN — Medication 81 MILLIGRAM(S): at 13:06

## 2022-12-18 RX ADMIN — FAMOTIDINE 10 MILLIGRAM(S): 10 INJECTION INTRAVENOUS at 13:07

## 2022-12-18 RX ADMIN — LEVETIRACETAM 500 MILLIGRAM(S): 250 TABLET, FILM COATED ORAL at 22:12

## 2022-12-18 RX ADMIN — HEPARIN SODIUM 5000 UNIT(S): 5000 INJECTION INTRAVENOUS; SUBCUTANEOUS at 13:07

## 2022-12-18 RX ADMIN — Medication 5 UNIT(S): at 17:57

## 2022-12-18 RX ADMIN — INSULIN GLARGINE 14 UNIT(S): 100 INJECTION, SOLUTION SUBCUTANEOUS at 22:11

## 2022-12-18 RX ADMIN — Medication 100 MILLIGRAM(S): at 13:06

## 2022-12-18 RX ADMIN — Medication 4: at 22:11

## 2022-12-18 RX ADMIN — Medication 4: at 17:58

## 2022-12-18 RX ADMIN — GABAPENTIN 300 MILLIGRAM(S): 400 CAPSULE ORAL at 06:42

## 2022-12-18 RX ADMIN — Medication 5 UNIT(S): at 13:17
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
## 2022-12-18 NOTE — PROGRESS NOTE ADULT - SUBJECTIVE AND OBJECTIVE BOX
afberiole  REVIEW OF SYSTEMS:  GEN: no fever,    no chills  RESP: no SOB,   no cough  CVS: no chest pain,   no palpitations  GI: no abdominal pain,   no nausea,   no vomiting,   no constipation,   no diarrhea  : no dysuria,   no frequency  NEURO: no headache,   no dizziness  PSYCH: no depression,   not anxious  Derm : no rash    MEDICATIONS  (STANDING):  allopurinol 100 milliGRAM(s) Oral daily  amLODIPine   Tablet 5 milliGRAM(s) Oral daily  aspirin  chewable 81 milliGRAM(s) Oral daily  atorvastatin 80 milliGRAM(s) Oral at bedtime  carvedilol 12.5 milliGRAM(s) Oral every 12 hours  chlorhexidine 4% Liquid 1 Application(s) Topical daily  famotidine    Tablet 10 milliGRAM(s) Oral daily  gabapentin 300 milliGRAM(s) Oral two times a day  heparin   Injectable 5000 Unit(s) SubCutaneous every 8 hours  influenza   Vaccine 0.5 milliLiter(s) IntraMuscular once  insulin glargine Injectable (LANTUS) 14 Unit(s) SubCutaneous at bedtime  insulin lispro (ADMELOG) corrective regimen sliding scale   SubCutaneous Before meals and at bedtime  insulin lispro Injectable (ADMELOG) 5 Unit(s) SubCutaneous three times a day before meals  levETIRAcetam 500 milliGRAM(s) Oral at bedtime  lidocaine/prilocaine Cream 1 Application(s) Topical <User Schedule>  polyethylene glycol 3350 17 Gram(s) Oral two times a day  senna 2 Tablet(s) Oral at bedtime  tamsulosin 0.4 milliGRAM(s) Oral at bedtime  valsartan 160 milliGRAM(s) Oral daily    MEDICATIONS  (PRN):  acetaminophen     Tablet .. 650 milliGRAM(s) Oral every 6 hours PRN Temp greater or equal to 38C (100.4F), Mild Pain (1 - 3)      Vital Signs Last 24 Hrs  T(C): 36.6 (18 Dec 2022 04:44), Max: 37.9 (17 Dec 2022 20:30)  T(F): 97.8 (18 Dec 2022 04:44), Max: 100.2 (17 Dec 2022 20:30)  HR: 78 (18 Dec 2022 04:44) (78 - 92)  BP: 115/69 (18 Dec 2022 04:44) (106/51 - 142/75)  BP(mean): --  RR: 18 (18 Dec 2022 04:44) (17 - 18)  SpO2: 95% (18 Dec 2022 04:44) (95% - 98%)    Parameters below as of 18 Dec 2022 04:44  Patient On (Oxygen Delivery Method): room air      CAPILLARY BLOOD GLUCOSE      POCT Blood Glucose.: 147 mg/dL (18 Dec 2022 09:16)  POCT Blood Glucose.: 157 mg/dL (17 Dec 2022 22:57)  POCT Blood Glucose.: 105 mg/dL (17 Dec 2022 20:31)  POCT Blood Glucose.: 107 mg/dL (17 Dec 2022 12:45)    I&O's Summary    17 Dec 2022 07:01  -  18 Dec 2022 07:00  --------------------------------------------------------  IN: 240 mL / OUT: 2000 mL / NET: -1760 mL        PHYSICAL EXAM:  HEAD:  Atraumatic, Normocephalic  NECK: Supple, No   JVD  CHEST/LUNG:   no     rales,     no,    rhonchi  HEART: Regular rate and rhythm;         murmur  ABDOMEN: Soft, Nontender, ;   EXTREMITIES:   less     edema  NEUROLOGY:  alert    LABS:    12-18    137  |  95<L>  |  45<H>  ----------------------------<  150<H>  4.0   |  26  |  4.71<H>    Ca    8.7      18 Dec 2022 08:41  Mg     2.2     12-18                              Consultant(s) Notes Reviewed:      Care Discussed with Consultants/Other Providers:

## 2022-12-18 NOTE — PROGRESS NOTE ADULT - SUBJECTIVE AND OBJECTIVE BOX
Chief complaint    Patient is a 62y old  Male who presents with a chief complaint of fall (17 Dec 2022 13:22)   Review of systems  Patient in bed, appears comfortable.    Labs and Fingersticks  CAPILLARY BLOOD GLUCOSE      POCT Blood Glucose.: 157 mg/dL (17 Dec 2022 22:57)  POCT Blood Glucose.: 105 mg/dL (17 Dec 2022 20:31)  POCT Blood Glucose.: 107 mg/dL (17 Dec 2022 12:45)  POCT Blood Glucose.: 116 mg/dL (17 Dec 2022 08:28)      Anion Gap, Serum: 16 (12-17 @ 07:39)      Calcium, Total Serum: 8.2 *L* (12-17 @ 07:39)          12-17    137  |  98  |  63<H>  ----------------------------<  110<H>  3.9   |  23  |  5.98<H>    Ca    8.2<L>      17 Dec 2022 07:39  Mg     2.2     12-17      Medications  MEDICATIONS  (STANDING):  allopurinol 100 milliGRAM(s) Oral daily  amLODIPine   Tablet 5 milliGRAM(s) Oral daily  aspirin  chewable 81 milliGRAM(s) Oral daily  atorvastatin 80 milliGRAM(s) Oral at bedtime  carvedilol 12.5 milliGRAM(s) Oral every 12 hours  chlorhexidine 4% Liquid 1 Application(s) Topical daily  famotidine    Tablet 10 milliGRAM(s) Oral daily  gabapentin 300 milliGRAM(s) Oral two times a day  heparin   Injectable 5000 Unit(s) SubCutaneous every 8 hours  influenza   Vaccine 0.5 milliLiter(s) IntraMuscular once  insulin glargine Injectable (LANTUS) 14 Unit(s) SubCutaneous at bedtime  insulin lispro (ADMELOG) corrective regimen sliding scale   SubCutaneous Before meals and at bedtime  insulin lispro Injectable (ADMELOG) 5 Unit(s) SubCutaneous three times a day before meals  levETIRAcetam 500 milliGRAM(s) Oral at bedtime  lidocaine/prilocaine Cream 1 Application(s) Topical <User Schedule>  polyethylene glycol 3350 17 Gram(s) Oral two times a day  senna 2 Tablet(s) Oral at bedtime  tamsulosin 0.4 milliGRAM(s) Oral at bedtime  valsartan 160 milliGRAM(s) Oral daily      Physical Exam  General: Patient appears comfortable.  Vital Signs Last 12 Hrs  T(F): 97.8 (12-18-22 @ 04:44), Max: 100.2 (12-17-22 @ 20:30)  HR: 78 (12-18-22 @ 04:44) (78 - 92)  BP: 115/69 (12-18-22 @ 04:44) (115/65 - 142/75)  BP(mean): --  RR: 18 (12-18-22 @ 04:44) (18 - 18)  SpO2: 95% (12-18-22 @ 04:44) (95% - 97%)  Neck: No palpable thyroid nodules.  CVS: S1S2, No murmurs  Respiratory: No wheezing, no crepitations  GI: Abdomen soft, non tender.  Musculoskeletal:  edema lower extremities.     Diagnostics

## 2022-12-18 NOTE — PROGRESS NOTE ADULT - ASSESSMENT
Assessment  DMT2: 62y Male with DM T2 with hyperglycemia admitted with on oral hypoglycemic agents at home, on basal bolus and insulin coverage, blood sugars are improving, insulin dose adjusted, eating partial meals,   SDH: Patient is stable, monitored remains in SICU.  HTN: On antihypertensive medications, monitored, asymptomatic.  CKD: On hemodialysis, labs, chart reviewed.                  Rubén Escoto MD  Cell:  [phone]  Office: [phone]

## 2022-12-19 LAB
ANION GAP SERPL CALC-SCNC: 17 MMOL/L — SIGNIFICANT CHANGE UP (ref 5–17)
BUN SERPL-MCNC: 68 MG/DL — HIGH (ref 7–23)
CALCIUM SERPL-MCNC: 8.5 MG/DL — SIGNIFICANT CHANGE UP (ref 8.4–10.5)
CHLORIDE SERPL-SCNC: 93 MMOL/L — LOW (ref 96–108)
CO2 SERPL-SCNC: 25 MMOL/L — SIGNIFICANT CHANGE UP (ref 22–31)
CREAT SERPL-MCNC: 7.26 MG/DL — HIGH (ref 0.5–1.3)
EGFR: 8 ML/MIN/1.73M2 — LOW
GLUCOSE BLDC GLUCOMTR-MCNC: 211 MG/DL — HIGH (ref 70–99)
GLUCOSE BLDC GLUCOMTR-MCNC: 233 MG/DL — HIGH (ref 70–99)
GLUCOSE BLDC GLUCOMTR-MCNC: 251 MG/DL — HIGH (ref 70–99)
GLUCOSE SERPL-MCNC: 201 MG/DL — HIGH (ref 70–99)
HCT VFR BLD CALC: 26.6 % — LOW (ref 39–50)
HGB BLD-MCNC: 8.2 G/DL — LOW (ref 13–17)
MCHC RBC-ENTMCNC: 27 PG — SIGNIFICANT CHANGE UP (ref 27–34)
MCHC RBC-ENTMCNC: 30.8 GM/DL — LOW (ref 32–36)
MCV RBC AUTO: 87.5 FL — SIGNIFICANT CHANGE UP (ref 80–100)
NRBC # BLD: 0 /100 WBCS — SIGNIFICANT CHANGE UP (ref 0–0)
PLATELET # BLD AUTO: 326 K/UL — SIGNIFICANT CHANGE UP (ref 150–400)
POTASSIUM SERPL-MCNC: 4.1 MMOL/L — SIGNIFICANT CHANGE UP (ref 3.5–5.3)
POTASSIUM SERPL-SCNC: 4.1 MMOL/L — SIGNIFICANT CHANGE UP (ref 3.5–5.3)
RBC # BLD: 3.04 M/UL — LOW (ref 4.2–5.8)
RBC # FLD: 16.2 % — HIGH (ref 10.3–14.5)
SODIUM SERPL-SCNC: 135 MMOL/L — SIGNIFICANT CHANGE UP (ref 135–145)
WBC # BLD: 10.81 K/UL — HIGH (ref 3.8–10.5)
WBC # FLD AUTO: 10.81 K/UL — HIGH (ref 3.8–10.5)

## 2022-12-19 PROCEDURE — 99253 IP/OBS CNSLTJ NEW/EST LOW 45: CPT

## 2022-12-19 RX ORDER — INSULIN LISPRO 100/ML
7 VIAL (ML) SUBCUTANEOUS
Refills: 0 | Status: DISCONTINUED | OUTPATIENT
Start: 2022-12-19 | End: 2022-12-19

## 2022-12-19 RX ORDER — INSULIN LISPRO 100/ML
8 VIAL (ML) SUBCUTANEOUS
Refills: 0 | Status: DISCONTINUED | OUTPATIENT
Start: 2022-12-19 | End: 2022-12-27

## 2022-12-19 RX ORDER — INSULIN GLARGINE 100 [IU]/ML
17 INJECTION, SOLUTION SUBCUTANEOUS AT BEDTIME
Refills: 0 | Status: DISCONTINUED | OUTPATIENT
Start: 2022-12-19 | End: 2022-12-31

## 2022-12-19 RX ADMIN — ATORVASTATIN CALCIUM 80 MILLIGRAM(S): 80 TABLET, FILM COATED ORAL at 21:50

## 2022-12-19 RX ADMIN — Medication 100 MILLIGRAM(S): at 17:31

## 2022-12-19 RX ADMIN — TAMSULOSIN HYDROCHLORIDE 0.4 MILLIGRAM(S): 0.4 CAPSULE ORAL at 21:51

## 2022-12-19 RX ADMIN — FAMOTIDINE 10 MILLIGRAM(S): 10 INJECTION INTRAVENOUS at 17:31

## 2022-12-19 RX ADMIN — HEPARIN SODIUM 5000 UNIT(S): 5000 INJECTION INTRAVENOUS; SUBCUTANEOUS at 14:35

## 2022-12-19 RX ADMIN — Medication 4: at 17:32

## 2022-12-19 RX ADMIN — Medication 8 UNIT(S): at 17:32

## 2022-12-19 RX ADMIN — GABAPENTIN 300 MILLIGRAM(S): 400 CAPSULE ORAL at 17:31

## 2022-12-19 RX ADMIN — HEPARIN SODIUM 5000 UNIT(S): 5000 INJECTION INTRAVENOUS; SUBCUTANEOUS at 21:51

## 2022-12-19 RX ADMIN — POLYETHYLENE GLYCOL 3350 17 GRAM(S): 17 POWDER, FOR SOLUTION ORAL at 05:15

## 2022-12-19 RX ADMIN — Medication 650 MILLIGRAM(S): at 11:45

## 2022-12-19 RX ADMIN — Medication 650 MILLIGRAM(S): at 11:08

## 2022-12-19 RX ADMIN — LEVETIRACETAM 500 MILLIGRAM(S): 250 TABLET, FILM COATED ORAL at 21:51

## 2022-12-19 RX ADMIN — INSULIN GLARGINE 17 UNIT(S): 100 INJECTION, SOLUTION SUBCUTANEOUS at 21:57

## 2022-12-19 RX ADMIN — Medication 4: at 08:43

## 2022-12-19 RX ADMIN — LIDOCAINE AND PRILOCAINE CREAM 1 APPLICATION(S): 25; 25 CREAM TOPICAL at 08:38

## 2022-12-19 RX ADMIN — VALSARTAN 160 MILLIGRAM(S): 80 TABLET ORAL at 05:15

## 2022-12-19 RX ADMIN — CARVEDILOL PHOSPHATE 12.5 MILLIGRAM(S): 80 CAPSULE, EXTENDED RELEASE ORAL at 17:31

## 2022-12-19 RX ADMIN — Medication 81 MILLIGRAM(S): at 17:31

## 2022-12-19 RX ADMIN — AMLODIPINE BESYLATE 5 MILLIGRAM(S): 2.5 TABLET ORAL at 05:15

## 2022-12-19 RX ADMIN — GABAPENTIN 300 MILLIGRAM(S): 400 CAPSULE ORAL at 05:14

## 2022-12-19 RX ADMIN — HEPARIN SODIUM 5000 UNIT(S): 5000 INJECTION INTRAVENOUS; SUBCUTANEOUS at 05:15

## 2022-12-19 RX ADMIN — SENNA PLUS 2 TABLET(S): 8.6 TABLET ORAL at 21:51

## 2022-12-19 RX ADMIN — CARVEDILOL PHOSPHATE 12.5 MILLIGRAM(S): 80 CAPSULE, EXTENDED RELEASE ORAL at 05:14

## 2022-12-19 RX ADMIN — Medication 6: at 21:51

## 2022-12-19 NOTE — PROGRESS NOTE ADULT - SUBJECTIVE AND OBJECTIVE BOX
Patient seen and examined resting in bed.   no distress    REVIEW OF SYSTEMS:  As per HPI, otherwise 8 full 10 ROS were unremarkable    MEDICATIONS  (STANDING):  allopurinol 100 milliGRAM(s) Oral daily  amLODIPine   Tablet 5 milliGRAM(s) Oral daily  aspirin  chewable 81 milliGRAM(s) Oral daily  atorvastatin 80 milliGRAM(s) Oral at bedtime  carvedilol 12.5 milliGRAM(s) Oral every 12 hours  chlorhexidine 4% Liquid 1 Application(s) Topical daily  famotidine    Tablet 10 milliGRAM(s) Oral daily  gabapentin 300 milliGRAM(s) Oral two times a day  heparin   Injectable 5000 Unit(s) SubCutaneous every 8 hours  influenza   Vaccine 0.5 milliLiter(s) IntraMuscular once  insulin glargine Injectable (LANTUS) 14 Unit(s) SubCutaneous at bedtime  insulin lispro (ADMELOG) corrective regimen sliding scale   SubCutaneous Before meals and at bedtime  insulin lispro Injectable (ADMELOG) 5 Unit(s) SubCutaneous three times a day before meals  levETIRAcetam 500 milliGRAM(s) Oral at bedtime  lidocaine/prilocaine Cream 1 Application(s) Topical <User Schedule>  polyethylene glycol 3350 17 Gram(s) Oral two times a day  senna 2 Tablet(s) Oral at bedtime  tamsulosin 0.4 milliGRAM(s) Oral at bedtime  valsartan 160 milliGRAM(s) Oral daily      VITAL:  T(C): , Max: 37.4 (12-17-22 @ 11:24)  T(F): , Max: 99.3 (12-17-22 @ 11:24)  HR: 83 (12-17-22 @ 11:24)  BP: 107/64 (12-17-22 @ 11:24)  BP(mean): 78 (12-17-22 @ 11:24)  RR: 18 (12-17-22 @ 11:24)  SpO2: 94% (12-17-22 @ 11:24)  Wt(kg): --    I and O's:    12-16 @ 07:01  -  12-17 @ 07:00  --------------------------------------------------------  IN: 480 mL / OUT: 0 mL / NET: 480 mL          PHYSICAL EXAM:    Constitutional: NAD  HEENT: PERRLA, EOMI,  MMM  Neck: No LAD, No JVD  Respiratory: CTAB  Cardiovascular: S1 and S2  Gastrointestinal: BS+, soft, NT/ND  Extremities: No peripheral edema  Neurological: A/O x 3, no focal deficits  Psychiatric: Normal mood, normal affect  : No Jay  Skin: No rashes  Access: +AVF +thrill    LABS:    12-17    137  |  98  |  63<H>  ----------------------------<  110<H>  3.9   |  23  |  5.98<H>    Ca    8.2<L>      17 Dec 2022 07:39  Mg     2.2     12-17        Assessment and Plan:   · Assessment	  62 year-old man with history of multiple medical issues including hypertension, diabetes mellitus, coronary artery diease, congestive heart failure with reduced EF, gout, and ESRD presents from PCP office for fall and is on a/c and has subdural hematoma .      1 Nuerosurg- Off A/C at present;  Started on Keppra   2 Renal-Plan for HD today  .  Lytes acceptable  3 CVS- Will maximize fluid removal to aid with pleural effusions.   BPs acceptable       Patient seen and examined resting in bed.   no distress  able to remove  2 liter , BP soft .  REVIEW OF SYSTEMS:  As per HPI, otherwise 8 full 10 ROS were unremarkable    MEDICATIONS  (STANDING):  allopurinol 100 milliGRAM(s) Oral daily  amLODIPine   Tablet 5 milliGRAM(s) Oral daily  aspirin  chewable 81 milliGRAM(s) Oral daily  atorvastatin 80 milliGRAM(s) Oral at bedtime  carvedilol 12.5 milliGRAM(s) Oral every 12 hours  chlorhexidine 4% Liquid 1 Application(s) Topical daily  famotidine    Tablet 10 milliGRAM(s) Oral daily  gabapentin 300 milliGRAM(s) Oral two times a day  heparin   Injectable 5000 Unit(s) SubCutaneous every 8 hours  influenza   Vaccine 0.5 milliLiter(s) IntraMuscular once  insulin glargine Injectable (LANTUS) 14 Unit(s) SubCutaneous at bedtime  insulin lispro (ADMELOG) corrective regimen sliding scale   SubCutaneous Before meals and at bedtime  insulin lispro Injectable (ADMELOG) 5 Unit(s) SubCutaneous three times a day before meals  levETIRAcetam 500 milliGRAM(s) Oral at bedtime  lidocaine/prilocaine Cream 1 Application(s) Topical <User Schedule>  polyethylene glycol 3350 17 Gram(s) Oral two times a day  senna 2 Tablet(s) Oral at bedtime  tamsulosin 0.4 milliGRAM(s) Oral at bedtime  valsartan 160 milliGRAM(s) Oral daily      VITAL:  T(C): , Max: 37.4 (12-17-22 @ 11:24)  T(F): , Max: 99.3 (12-17-22 @ 11:24)  HR: 83 (12-17-22 @ 11:24)  BP: 107/64 (12-17-22 @ 11:24)  BP(mean): 78 (12-17-22 @ 11:24)  RR: 18 (12-17-22 @ 11:24)  SpO2: 94% (12-17-22 @ 11:24)  Wt(kg): --    I and O's:    12-16 @ 07:01  -  12-17 @ 07:00  --------------------------------------------------------  IN: 480 mL / OUT: 0 mL / NET: 480 mL          PHYSICAL EXAM:    Constitutional: NAD  HEENT: PERRLA, EOMI,  MMM  Neck: No LAD, No JVD  Respiratory: CTAB  Cardiovascular: S1 and S2  Gastrointestinal: BS+, soft, NT/ND  Extremities: No peripheral edema  Neurological: A/O x 3, no focal deficits  Psychiatric: Normal mood, normal affect  : No Jay  Skin: No rashes  Access: +AVF +thrill    LABS:    12-17    137  |  98  |  63<H>  ----------------------------<  110<H>  3.9   |  23  |  5.98<H>    Ca    8.2<L>      17 Dec 2022 07:39  Mg     2.2     12-17        Assessment and Plan:   · Assessment	  62 year-old man with history of multiple medical issues including hypertension, diabetes mellitus, coronary artery diease, congestive heart failure with reduced EF, gout, and ESRD presents from PCP office for fall and is on a/c and has subdural hematoma .      1 Nuerosurg- Off A/C at present;  Started on Keppra   2 Renal-Plan for HD today  .  Lytes acceptable  3 CVS- Will maximize fluid removal to aid with pleural effusions.   BPs acceptable
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
## 2022-12-19 NOTE — PROGRESS NOTE ADULT - SUBJECTIVE AND OBJECTIVE BOX
Chief complaint    Patient is a 62y old  Male who presents with a chief complaint of fall (19 Dec 2022 13:21)   Review of systems  Patient in bed, appears comfortable.    Labs and Fingersticks  CAPILLARY BLOOD GLUCOSE      POCT Blood Glucose.: 211 mg/dL (19 Dec 2022 08:40)  POCT Blood Glucose.: 236 mg/dL (18 Dec 2022 21:51)  POCT Blood Glucose.: 230 mg/dL (18 Dec 2022 17:36)      Anion Gap, Serum: 17 (12-19 @ 06:55)  Anion Gap, Serum: 16 (12-18 @ 08:41)      Calcium, Total Serum: 8.5 (12-19 @ 06:55)  Calcium, Total Serum: 8.7 (12-18 @ 08:41)          12-19    135  |  93<L>  |  68<H>  ----------------------------<  201<H>  4.1   |  25  |  7.26<H>    Ca    8.5      19 Dec 2022 06:55  Mg     2.2     12-18                          8.2    10.81 )-----------( 326      ( 19 Dec 2022 06:58 )             26.6     Medications  MEDICATIONS  (STANDING):  allopurinol 100 milliGRAM(s) Oral daily  amLODIPine   Tablet 5 milliGRAM(s) Oral daily  aspirin  chewable 81 milliGRAM(s) Oral daily  atorvastatin 80 milliGRAM(s) Oral at bedtime  carvedilol 12.5 milliGRAM(s) Oral every 12 hours  chlorhexidine 4% Liquid 1 Application(s) Topical daily  famotidine    Tablet 10 milliGRAM(s) Oral daily  gabapentin 300 milliGRAM(s) Oral two times a day  heparin   Injectable 5000 Unit(s) SubCutaneous every 8 hours  influenza   Vaccine 0.5 milliLiter(s) IntraMuscular once  insulin glargine Injectable (LANTUS) 17 Unit(s) SubCutaneous at bedtime  insulin lispro (ADMELOG) corrective regimen sliding scale   SubCutaneous Before meals and at bedtime  insulin lispro Injectable (ADMELOG) 8 Unit(s) SubCutaneous three times a day before meals  levETIRAcetam 500 milliGRAM(s) Oral at bedtime  lidocaine/prilocaine Cream 1 Application(s) Topical <User Schedule>  polyethylene glycol 3350 17 Gram(s) Oral two times a day  senna 2 Tablet(s) Oral at bedtime  tamsulosin 0.4 milliGRAM(s) Oral at bedtime  valsartan 160 milliGRAM(s) Oral daily      Physical Exam  General: Patient appears comfortable.  Vital Signs Last 12 Hrs  T(F): 98.3 (12-19-22 @ 14:16), Max: 98.7 (12-19-22 @ 06:22)  HR: 73 (12-19-22 @ 14:16) (71 - 81)  BP: 101/66 (12-19-22 @ 14:16) (99/75 - 116/73)  BP(mean): 72 (12-19-22 @ 08:48) (72 - 72)  RR: 18 (12-19-22 @ 14:16) (18 - 18)  SpO2: 95% (12-19-22 @ 14:16) (91% - 97%)  Neck: No palpable thyroid nodules.  CVS: S1S2, No murmurs  Respiratory: No wheezing, no crepitations  GI: Abdomen soft, non tender.  Musculoskeletal:  edema lower extremities.     Diagnostics

## 2022-12-19 NOTE — PROGRESS NOTE ADULT - SUBJECTIVE AND OBJECTIVE BOX
afberile  REVIEW OF SYSTEMS:  GEN: no fever,    no chills  RESP: no SOB,   no cough  CVS: no chest pain,   no palpitations  GI: no abdominal pain,   no nausea,   no vomiting,   no constipation,   no diarrhea  : no dysuria,   no frequency  NEURO: no headache,   no dizziness  PSYCH: no depression,   not anxious  Derm : no rash    MEDICATIONS  (STANDING):  allopurinol 100 milliGRAM(s) Oral daily  amLODIPine   Tablet 5 milliGRAM(s) Oral daily  aspirin  chewable 81 milliGRAM(s) Oral daily  atorvastatin 80 milliGRAM(s) Oral at bedtime  carvedilol 12.5 milliGRAM(s) Oral every 12 hours  chlorhexidine 4% Liquid 1 Application(s) Topical daily  famotidine    Tablet 10 milliGRAM(s) Oral daily  gabapentin 300 milliGRAM(s) Oral two times a day  heparin   Injectable 5000 Unit(s) SubCutaneous every 8 hours  influenza   Vaccine 0.5 milliLiter(s) IntraMuscular once  insulin glargine Injectable (LANTUS) 14 Unit(s) SubCutaneous at bedtime  insulin lispro (ADMELOG) corrective regimen sliding scale   SubCutaneous Before meals and at bedtime  insulin lispro Injectable (ADMELOG) 7 Unit(s) SubCutaneous three times a day before meals  levETIRAcetam 500 milliGRAM(s) Oral at bedtime  lidocaine/prilocaine Cream 1 Application(s) Topical <User Schedule>  polyethylene glycol 3350 17 Gram(s) Oral two times a day  senna 2 Tablet(s) Oral at bedtime  tamsulosin 0.4 milliGRAM(s) Oral at bedtime  valsartan 160 milliGRAM(s) Oral daily    MEDICATIONS  (PRN):  acetaminophen     Tablet .. 650 milliGRAM(s) Oral every 6 hours PRN Temp greater or equal to 38C (100.4F), Mild Pain (1 - 3)      Vital Signs Last 24 Hrs  T(C): 36.8 (18 Dec 2022 20:38), Max: 37.3 (18 Dec 2022 12:18)  T(F): 98.2 (18 Dec 2022 20:38), Max: 99.2 (18 Dec 2022 12:18)  HR: 76 (18 Dec 2022 20:38) (76 - 86)  BP: 121/66 (18 Dec 2022 20:38) (111/68 - 121/66)  BP(mean): --  RR: 18 (18 Dec 2022 20:38) (18 - 18)  SpO2: 97% (18 Dec 2022 20:38) (94% - 97%)    Parameters below as of 18 Dec 2022 20:38  Patient On (Oxygen Delivery Method): room air      CAPILLARY BLOOD GLUCOSE      POCT Blood Glucose.: 236 mg/dL (18 Dec 2022 21:51)  POCT Blood Glucose.: 230 mg/dL (18 Dec 2022 17:36)  POCT Blood Glucose.: 183 mg/dL (18 Dec 2022 12:41)  POCT Blood Glucose.: 147 mg/dL (18 Dec 2022 09:16)    I&O's Summary    18 Dec 2022 07:01  -  19 Dec 2022 07:00  --------------------------------------------------------  IN: 420 mL / OUT: 0 mL / NET: 420 mL        PHYSICAL EXAM:  HEAD:  Atraumatic, Normocephalic  NECK: Supple, No   JVD  CHEST/LUNG:   no     rales,     no,    rhonchi  HEART: Regular rate and rhythm;         murmur  ABDOMEN: Soft, Nontender, ;   EXTREMITIES:  no      edema  NEUROLOGY:  alert    LABS:                        8.2    10.81 )-----------( 326      ( 19 Dec 2022 06:58 )             26.6     12-19    135  |  93<L>  |  68<H>  ----------------------------<  201<H>  4.1   |  25  |  7.26<H>    Ca    8.5      19 Dec 2022 06:55  Mg     2.2     12-18                              Consultant(s) Notes Reviewed:      Care Discussed with Consultants/Other Providers:
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
## 2022-12-19 NOTE — PROGRESS NOTE ADULT - ASSESSMENT
Assessment  DMT2: 62y Male with DM T2 with hyperglycemia admitted with on oral hypoglycemic agents at home, on basal bolus and insulin coverage, blood sugars are not at target, insulin dose increased eating partial meals,   SDH: Patient is stable, monitored remains in SICU.  HTN: On antihypertensive medications, monitored, asymptomatic.  CKD: On hemodialysis, labs, chart reviewed.                  Rubén Escoto MD  Cell:  [phone]  Office: [phone]

## 2022-12-20 LAB
ANION GAP SERPL CALC-SCNC: 14 MMOL/L — SIGNIFICANT CHANGE UP (ref 5–17)
BUN SERPL-MCNC: 52 MG/DL — HIGH (ref 7–23)
CALCIUM SERPL-MCNC: 8.6 MG/DL — SIGNIFICANT CHANGE UP (ref 8.4–10.5)
CHLORIDE SERPL-SCNC: 93 MMOL/L — LOW (ref 96–108)
CO2 SERPL-SCNC: 28 MMOL/L — SIGNIFICANT CHANGE UP (ref 22–31)
CREAT SERPL-MCNC: 5.87 MG/DL — HIGH (ref 0.5–1.3)
EGFR: 10 ML/MIN/1.73M2 — LOW
GLUCOSE BLDC GLUCOMTR-MCNC: 117 MG/DL — HIGH (ref 70–99)
GLUCOSE BLDC GLUCOMTR-MCNC: 162 MG/DL — HIGH (ref 70–99)
GLUCOSE BLDC GLUCOMTR-MCNC: 181 MG/DL — HIGH (ref 70–99)
GLUCOSE BLDC GLUCOMTR-MCNC: 183 MG/DL — HIGH (ref 70–99)
GLUCOSE BLDC GLUCOMTR-MCNC: 183 MG/DL — HIGH (ref 70–99)
GLUCOSE SERPL-MCNC: 138 MG/DL — HIGH (ref 70–99)
POTASSIUM SERPL-MCNC: 4 MMOL/L — SIGNIFICANT CHANGE UP (ref 3.5–5.3)
POTASSIUM SERPL-SCNC: 4 MMOL/L — SIGNIFICANT CHANGE UP (ref 3.5–5.3)
SODIUM SERPL-SCNC: 135 MMOL/L — SIGNIFICANT CHANGE UP (ref 135–145)

## 2022-12-20 RX ORDER — MIDODRINE HYDROCHLORIDE 2.5 MG/1
10 TABLET ORAL ONCE
Refills: 0 | Status: COMPLETED | OUTPATIENT
Start: 2022-12-21 | End: 2022-12-21

## 2022-12-20 RX ORDER — CLOPIDOGREL BISULFATE 75 MG/1
75 TABLET, FILM COATED ORAL DAILY
Refills: 0 | Status: DISCONTINUED | OUTPATIENT
Start: 2022-12-20 | End: 2022-12-31

## 2022-12-20 RX ORDER — VALSARTAN 80 MG/1
80 TABLET ORAL DAILY
Refills: 0 | Status: DISCONTINUED | OUTPATIENT
Start: 2022-12-20 | End: 2022-12-22

## 2022-12-20 RX ORDER — ACETAMINOPHEN 500 MG
1000 TABLET ORAL ONCE
Refills: 0 | Status: COMPLETED | OUTPATIENT
Start: 2022-12-20 | End: 2022-12-20

## 2022-12-20 RX ADMIN — Medication 2: at 08:41

## 2022-12-20 RX ADMIN — SENNA PLUS 2 TABLET(S): 8.6 TABLET ORAL at 22:33

## 2022-12-20 RX ADMIN — Medication 1000 MILLIGRAM(S): at 01:52

## 2022-12-20 RX ADMIN — FAMOTIDINE 10 MILLIGRAM(S): 10 INJECTION INTRAVENOUS at 13:04

## 2022-12-20 RX ADMIN — AMLODIPINE BESYLATE 5 MILLIGRAM(S): 2.5 TABLET ORAL at 06:59

## 2022-12-20 RX ADMIN — Medication 8 UNIT(S): at 08:41

## 2022-12-20 RX ADMIN — Medication 400 MILLIGRAM(S): at 01:21

## 2022-12-20 RX ADMIN — ATORVASTATIN CALCIUM 80 MILLIGRAM(S): 80 TABLET, FILM COATED ORAL at 22:33

## 2022-12-20 RX ADMIN — TAMSULOSIN HYDROCHLORIDE 0.4 MILLIGRAM(S): 0.4 CAPSULE ORAL at 22:33

## 2022-12-20 RX ADMIN — CLOPIDOGREL BISULFATE 75 MILLIGRAM(S): 75 TABLET, FILM COATED ORAL at 14:13

## 2022-12-20 RX ADMIN — INSULIN GLARGINE 17 UNIT(S): 100 INJECTION, SOLUTION SUBCUTANEOUS at 22:39

## 2022-12-20 RX ADMIN — HEPARIN SODIUM 5000 UNIT(S): 5000 INJECTION INTRAVENOUS; SUBCUTANEOUS at 14:12

## 2022-12-20 RX ADMIN — VALSARTAN 80 MILLIGRAM(S): 80 TABLET ORAL at 07:02

## 2022-12-20 RX ADMIN — POLYETHYLENE GLYCOL 3350 17 GRAM(S): 17 POWDER, FOR SOLUTION ORAL at 17:46

## 2022-12-20 RX ADMIN — Medication 8 UNIT(S): at 13:03

## 2022-12-20 RX ADMIN — Medication 2: at 22:40

## 2022-12-20 RX ADMIN — HEPARIN SODIUM 5000 UNIT(S): 5000 INJECTION INTRAVENOUS; SUBCUTANEOUS at 07:00

## 2022-12-20 RX ADMIN — HEPARIN SODIUM 5000 UNIT(S): 5000 INJECTION INTRAVENOUS; SUBCUTANEOUS at 22:33

## 2022-12-20 RX ADMIN — Medication 100 MILLIGRAM(S): at 14:12

## 2022-12-20 RX ADMIN — LIDOCAINE AND PRILOCAINE CREAM 1 APPLICATION(S): 25; 25 CREAM TOPICAL at 00:42

## 2022-12-20 RX ADMIN — Medication 2: at 17:46

## 2022-12-20 RX ADMIN — CARVEDILOL PHOSPHATE 12.5 MILLIGRAM(S): 80 CAPSULE, EXTENDED RELEASE ORAL at 06:59

## 2022-12-20 RX ADMIN — Medication 8 UNIT(S): at 17:47

## 2022-12-20 RX ADMIN — GABAPENTIN 300 MILLIGRAM(S): 400 CAPSULE ORAL at 17:46

## 2022-12-20 RX ADMIN — CARVEDILOL PHOSPHATE 12.5 MILLIGRAM(S): 80 CAPSULE, EXTENDED RELEASE ORAL at 17:47

## 2022-12-20 RX ADMIN — GABAPENTIN 300 MILLIGRAM(S): 400 CAPSULE ORAL at 07:00

## 2022-12-20 RX ADMIN — CHLORHEXIDINE GLUCONATE 1 APPLICATION(S): 213 SOLUTION TOPICAL at 13:18

## 2022-12-20 RX ADMIN — Medication 81 MILLIGRAM(S): at 13:04

## 2022-12-20 NOTE — OCCUPATIONAL THERAPY INITIAL EVALUATION ADULT - ADL RETRAINING, OT EVAL
GOAL: Pt will be independent with upper body dressing in 4 weeks.
GOAL: Pt will be independent with upper body dressing in 4 weeks.
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
## 2022-12-20 NOTE — OCCUPATIONAL THERAPY INITIAL EVALUATION ADULT - MANUAL MUSCLE TESTING RESULTS, REHAB EVAL
LUE 3/5, RUE 3-/5, B  3-/5, BLE 2+/5/grossly assessed due to
BUE 3/5, BLE 3/5/grossly assessed due to

## 2022-12-20 NOTE — OCCUPATIONAL THERAPY INITIAL EVALUATION ADULT - IMPAIRMENTS CONTRIBUTING IMPAIRED BED MOBILITY, REHAB EVAL
impaired balance/impaired motor control/impaired postural control/decreased strength
impaired balance/impaired motor control/impaired postural control/decreased strength

## 2022-12-20 NOTE — OCCUPATIONAL THERAPY INITIAL EVALUATION ADULT - BALANCE DISTURBANCE, IDENTIFIED IMPAIRMENT CONTRIBUTE, REHAB EVAL
impaired motor control/impaired postural control/decreased strength
impaired motor control/impaired postural control/decreased strength

## 2022-12-20 NOTE — PROGRESS NOTE ADULT - SUBJECTIVE AND OBJECTIVE BOX
afberile    REVIEW OF SYSTEMS:  CONSTITUTIONAL: No fever, weight loss  ENT:  No difficulty hearing, tinnitus, vertigo  NECK: No pain or stiffness  RESPIRATORY: No cough, wheezing, chills or hemoptysis; No Shortness of Breath  CARDIOVASCULAR: No chest pain, palpitations, dizziness  GASTROINTESTINAL: No abdominal or epigastric pain. No nausea, vomiting, or hematemesis; No diarrhea or constipation. No melena or hematochezia.  GENITOURINARY: No dysuria, frequency, hematuria, or incontinence  NEUROLOGICAL: No headaches  SKIN: No itching, burning, rash  LYMPH Nodes: No enlarged glands  ENDOCRINE: No heat or cold intolerance; No hair loss  MUSCULOSKELETAL: No joint pain or swelling; No muscle, back, rle pain, r knee swelling  PSYCHIATRIC: No depression, anxiety, mood swings  HEME/LYMPH: No easy bruising, or bleeding gums  ALLERGY AND IMMUNOLOGIC: No hives or eczema	    MEDICATIONS  (STANDING):  allopurinol 100 milliGRAM(s) Oral daily  amLODIPine   Tablet 5 milliGRAM(s) Oral daily  aspirin  chewable 81 milliGRAM(s) Oral daily  atorvastatin 80 milliGRAM(s) Oral at bedtime  carvedilol 12.5 milliGRAM(s) Oral every 12 hours  chlorhexidine 4% Liquid 1 Application(s) Topical daily  clopidogrel Tablet 75 milliGRAM(s) Oral daily  famotidine    Tablet 10 milliGRAM(s) Oral daily  gabapentin 300 milliGRAM(s) Oral two times a day  heparin   Injectable 5000 Unit(s) SubCutaneous every 8 hours  influenza   Vaccine 0.5 milliLiter(s) IntraMuscular once  insulin glargine Injectable (LANTUS) 17 Unit(s) SubCutaneous at bedtime  insulin lispro (ADMELOG) corrective regimen sliding scale   SubCutaneous Before meals and at bedtime  insulin lispro Injectable (ADMELOG) 8 Unit(s) SubCutaneous three times a day before meals  lidocaine/prilocaine Cream 1 Application(s) Topical <User Schedule>  polyethylene glycol 3350 17 Gram(s) Oral two times a day  senna 2 Tablet(s) Oral at bedtime  tamsulosin 0.4 milliGRAM(s) Oral at bedtime  valsartan 80 milliGRAM(s) Oral daily    MEDICATIONS  (PRN):  acetaminophen     Tablet .. 650 milliGRAM(s) Oral every 6 hours PRN Temp greater or equal to 38C (100.4F), Mild Pain (1 - 3)      Vital Signs Last 24 Hrs  T(C): 37.1 (20 Dec 2022 04:14), Max: 37.1 (20 Dec 2022 04:14)  T(F): 98.8 (20 Dec 2022 04:14), Max: 98.8 (20 Dec 2022 04:14)  HR: 68 (20 Dec 2022 04:14) (68 - 78)  BP: 105/64 (20 Dec 2022 04:14) (99/75 - 115/67)  BP(mean): --  RR: 18 (20 Dec 2022 04:14) (18 - 18)  SpO2: 99% (20 Dec 2022 04:14) (93% - 99%)    Parameters below as of 20 Dec 2022 04:14  Patient On (Oxygen Delivery Method): room air      CAPILLARY BLOOD GLUCOSE      POCT Blood Glucose.: 162 mg/dL (20 Dec 2022 08:33)  POCT Blood Glucose.: 251 mg/dL (19 Dec 2022 21:23)  POCT Blood Glucose.: 233 mg/dL (19 Dec 2022 17:21)    I&O's Summary    19 Dec 2022 07:01  -  20 Dec 2022 07:00  --------------------------------------------------------  IN: 0 mL / OUT: 2000 mL / NET: -2000 mL          Appearance: Normal	  HEENT:   Normal oral mucosa, PERRL, EOMI	  Lymphatic: No lymphadenopathy  Cardiovascular: Normal S1 S2, No JVD  Respiratory: Lungs clear to auscultation	  Psychiatry: A & O x 3, Mood & affect appropriate  Gastrointestinal:  Soft, Non-tender, + BS	  Skin: No rash, No ecchymoses	  Extremities: Normal range of motion  Vascular: Peripheral pulses palpable bilaterally    LABS:                        8.2    10.81 )-----------( 326      ( 19 Dec 2022 06:58 )             26.6     12-20    135  |  93<L>  |  52<H>  ----------------------------<  138<H>  4.0   |  28  |  5.87<H>    Ca    8.6      20 Dec 2022 06:35                              Consultant(s) Notes Reviewed:      Care Discussed with Consultants/Other Providers:

## 2022-12-20 NOTE — PROGRESS NOTE ADULT - SUBJECTIVE AND OBJECTIVE BOX
Patient seen and examined resting in bed.   no distress    REVIEW OF SYSTEMS:  As per HPI, otherwise 8 full 10 ROS were unremarkable    MEDICATIONS  (STANDING):  allopurinol 100 milliGRAM(s) Oral daily  amLODIPine   Tablet 5 milliGRAM(s) Oral daily  aspirin  chewable 81 milliGRAM(s) Oral daily  atorvastatin 80 milliGRAM(s) Oral at bedtime  carvedilol 12.5 milliGRAM(s) Oral every 12 hours  chlorhexidine 4% Liquid 1 Application(s) Topical daily  famotidine    Tablet 10 milliGRAM(s) Oral daily  gabapentin 300 milliGRAM(s) Oral two times a day  heparin   Injectable 5000 Unit(s) SubCutaneous every 8 hours  influenza   Vaccine 0.5 milliLiter(s) IntraMuscular once  insulin glargine Injectable (LANTUS) 14 Unit(s) SubCutaneous at bedtime  insulin lispro (ADMELOG) corrective regimen sliding scale   SubCutaneous Before meals and at bedtime  insulin lispro Injectable (ADMELOG) 5 Unit(s) SubCutaneous three times a day before meals  levETIRAcetam 500 milliGRAM(s) Oral at bedtime  lidocaine/prilocaine Cream 1 Application(s) Topical <User Schedule>  polyethylene glycol 3350 17 Gram(s) Oral two times a day  senna 2 Tablet(s) Oral at bedtime  tamsulosin 0.4 milliGRAM(s) Oral at bedtime  valsartan 160 milliGRAM(s) Oral daily      VITAL:  T(C): , Max: 37.4 (12-17-22 @ 11:24)  T(F): , Max: 99.3 (12-17-22 @ 11:24)  HR: 83 (12-17-22 @ 11:24)  BP: 107/64 (12-17-22 @ 11:24)  BP(mean): 78 (12-17-22 @ 11:24)  RR: 18 (12-17-22 @ 11:24)  SpO2: 94% (12-17-22 @ 11:24)  Wt(kg): --    I and O's:    12-16 @ 07:01  -  12-17 @ 07:00  --------------------------------------------------------  IN: 480 mL / OUT: 0 mL / NET: 480 mL          PHYSICAL EXAM:    Constitutional: NAD  HEENT: PERRLA, EOMI,  MMM  Neck: No LAD, No JVD  Respiratory: CTAB  Cardiovascular: S1 and S2  Gastrointestinal: BS+, soft, NT/ND  Extremities: No peripheral edema  Neurological: A/O x 3, no focal deficits  Psychiatric: Normal mood, normal affect  : No Jay  Skin: No rashes  Access: +AVF +thrill    LABS:    12-17    137  |  98  |  63<H>  ----------------------------<  110<H>  3.9   |  23  |  5.98<H>    Ca    8.2<L>      17 Dec 2022 07:39  Mg     2.2     12-17        Assessment and Plan:   · Assessment	  62 year-old man with history of multiple medical issues including hypertension, diabetes mellitus, coronary artery diease, congestive heart failure with reduced EF, gout, and ESRD presents from PCP office for fall and is on a/c and has subdural hematoma .      1 Nuerosurg- Off A/C at present;  Started on Keppra   2 Renal-Plan for HD tomorrow , midodrine 10 mg b/f hd if bp <90/50 UF 1 liter .  Lytes acceptable  3 CVS- Will maximize fluid removal to aid with pleural effusions.   BP soft

## 2022-12-20 NOTE — OCCUPATIONAL THERAPY INITIAL EVALUATION ADULT - BED MOBILITY TRAINING, PT EVAL
GOAL: Pt will perform bed mobility with min assist in 4 weeks
GOAL: Pt will perform bed mobility independently in 4 weeks
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
## 2022-12-20 NOTE — OCCUPATIONAL THERAPY INITIAL EVALUATION ADULT - LIVES WITH, PROFILE
lives with spouse in a private house independent with ADLs and ambulates with SC and RW.
lives with spouse in a private house independent with ADLs and ambulates with SC and RW.

## 2022-12-20 NOTE — PROGRESS NOTE ADULT - ASSESSMENT
Assessment  DMT2: 62y Male with DM T2 with hyperglycemia admitted with on oral hypoglycemic agents at home, on basal bolus and insulin coverage, blood sugars are trending down, he is lethargic, eating partial meals,   SDH: Patient is stable, monitored remains in SICU.  HTN: On antihypertensive medications, monitored, asymptomatic.  CKD: On hemodialysis, labs, chart reviewed.                  Rubén Escoto MD  Cell:  [phone]  Office: [phone]

## 2022-12-20 NOTE — OCCUPATIONAL THERAPY INITIAL EVALUATION ADULT - PHYSICAL ASSIST/NONPHYSICAL ASSIST: SIT/SUPINE, REHAB EVAL
A (Adroit Xb 3.0) guide catheter was removed.  verbal cues/nonverbal cues (demo/gestures)/1 person assist

## 2022-12-20 NOTE — PROGRESS NOTE ADULT - SUBJECTIVE AND OBJECTIVE BOX
Chief complaint    Patient is a 62y old  Male who presents with a chief complaint of fall (20 Dec 2022 12:41)   Review of systems  Patient in bed, appears comfortable.    Labs and Fingersticks  CAPILLARY BLOOD GLUCOSE      POCT Blood Glucose.: 117 mg/dL (20 Dec 2022 12:20)  POCT Blood Glucose.: 162 mg/dL (20 Dec 2022 08:33)  POCT Blood Glucose.: 251 mg/dL (19 Dec 2022 21:23)  POCT Blood Glucose.: 233 mg/dL (19 Dec 2022 17:21)      Anion Gap, Serum: 14 (12-20 @ 06:35)  Anion Gap, Serum: 17 (12-19 @ 06:55)      Calcium, Total Serum: 8.6 (12-20 @ 06:35)  Calcium, Total Serum: 8.5 (12-19 @ 06:55)          12-20    135  |  93<L>  |  52<H>  ----------------------------<  138<H>  4.0   |  28  |  5.87<H>    Ca    8.6      20 Dec 2022 06:35                          8.2    10.81 )-----------( 326      ( 19 Dec 2022 06:58 )             26.6     Medications  MEDICATIONS  (STANDING):  allopurinol 100 milliGRAM(s) Oral daily  amLODIPine   Tablet 5 milliGRAM(s) Oral daily  aspirin  chewable 81 milliGRAM(s) Oral daily  atorvastatin 80 milliGRAM(s) Oral at bedtime  carvedilol 12.5 milliGRAM(s) Oral every 12 hours  chlorhexidine 4% Liquid 1 Application(s) Topical daily  clopidogrel Tablet 75 milliGRAM(s) Oral daily  famotidine    Tablet 10 milliGRAM(s) Oral daily  gabapentin 300 milliGRAM(s) Oral two times a day  heparin   Injectable 5000 Unit(s) SubCutaneous every 8 hours  influenza   Vaccine 0.5 milliLiter(s) IntraMuscular once  insulin glargine Injectable (LANTUS) 17 Unit(s) SubCutaneous at bedtime  insulin lispro (ADMELOG) corrective regimen sliding scale   SubCutaneous Before meals and at bedtime  insulin lispro Injectable (ADMELOG) 8 Unit(s) SubCutaneous three times a day before meals  lidocaine/prilocaine Cream 1 Application(s) Topical <User Schedule>  polyethylene glycol 3350 17 Gram(s) Oral two times a day  senna 2 Tablet(s) Oral at bedtime  tamsulosin 0.4 milliGRAM(s) Oral at bedtime  valsartan 80 milliGRAM(s) Oral daily      Physical Exam  General: Patient appears comfortable.  Vital Signs Last 12 Hrs  T(F): 98.7 (12-20-22 @ 11:34), Max: 98.8 (12-20-22 @ 04:14)  HR: 67 (12-20-22 @ 11:34) (67 - 68)  BP: 98/66 (12-20-22 @ 11:53) (98/66 - 105/64)  BP(mean): --  RR: 18 (12-20-22 @ 11:34) (18 - 18)  SpO2: 98% (12-20-22 @ 11:34) (98% - 99%)  Neck: No palpable thyroid nodules.  CVS: S1S2, No murmurs  Respiratory: No wheezing, no crepitations  GI: Abdomen soft, non tender.  Musculoskeletal:  edema lower extremities.     Diagnostics

## 2022-12-20 NOTE — OCCUPATIONAL THERAPY INITIAL EVALUATION ADULT - BALANCE TRAINING, PT EVAL
GOAL: Pt will demonstrate improved static/dynamic balance to fair in order to increase safety and independence in ADLs within 4 weeks
GOAL: Pt will demonstrate improved static/dynamic balance to good in order to increase safety and independence in ADLs within 4 weeks

## 2022-12-20 NOTE — OCCUPATIONAL THERAPY INITIAL EVALUATION ADULT - PLANNED THERAPY INTERVENTIONS, OT EVAL
ADL retraining/balance training/bed mobility training/cognitive, visual perceptual/strengthening
ADL retraining/balance training/bed mobility training/strengthening/transfer training

## 2022-12-21 ENCOUNTER — NON-APPOINTMENT (OUTPATIENT)
Age: 62
End: 2022-12-21

## 2022-12-21 LAB
GLUCOSE BLDC GLUCOMTR-MCNC: 113 MG/DL — HIGH (ref 70–99)
GLUCOSE BLDC GLUCOMTR-MCNC: 121 MG/DL — HIGH (ref 70–99)
GLUCOSE BLDC GLUCOMTR-MCNC: 134 MG/DL — HIGH (ref 70–99)
GLUCOSE BLDC GLUCOMTR-MCNC: 142 MG/DL — HIGH (ref 70–99)
SARS-COV-2 RNA SPEC QL NAA+PROBE: DETECTED

## 2022-12-21 RX ADMIN — SENNA PLUS 2 TABLET(S): 8.6 TABLET ORAL at 21:23

## 2022-12-21 RX ADMIN — LIDOCAINE AND PRILOCAINE CREAM 1 APPLICATION(S): 25; 25 CREAM TOPICAL at 00:49

## 2022-12-21 RX ADMIN — CHLORHEXIDINE GLUCONATE 1 APPLICATION(S): 213 SOLUTION TOPICAL at 15:38

## 2022-12-21 RX ADMIN — HEPARIN SODIUM 5000 UNIT(S): 5000 INJECTION INTRAVENOUS; SUBCUTANEOUS at 06:38

## 2022-12-21 RX ADMIN — TAMSULOSIN HYDROCHLORIDE 0.4 MILLIGRAM(S): 0.4 CAPSULE ORAL at 21:23

## 2022-12-21 RX ADMIN — MIDODRINE HYDROCHLORIDE 10 MILLIGRAM(S): 2.5 TABLET ORAL at 08:02

## 2022-12-21 RX ADMIN — Medication 8 UNIT(S): at 08:10

## 2022-12-21 RX ADMIN — CLOPIDOGREL BISULFATE 75 MILLIGRAM(S): 75 TABLET, FILM COATED ORAL at 14:07

## 2022-12-21 RX ADMIN — GABAPENTIN 300 MILLIGRAM(S): 400 CAPSULE ORAL at 06:38

## 2022-12-21 RX ADMIN — Medication 100 MILLIGRAM(S): at 14:07

## 2022-12-21 RX ADMIN — HEPARIN SODIUM 5000 UNIT(S): 5000 INJECTION INTRAVENOUS; SUBCUTANEOUS at 21:23

## 2022-12-21 RX ADMIN — INSULIN GLARGINE 17 UNIT(S): 100 INJECTION, SOLUTION SUBCUTANEOUS at 21:23

## 2022-12-21 RX ADMIN — Medication 8 UNIT(S): at 17:17

## 2022-12-21 RX ADMIN — HEPARIN SODIUM 5000 UNIT(S): 5000 INJECTION INTRAVENOUS; SUBCUTANEOUS at 14:06

## 2022-12-21 RX ADMIN — Medication 81 MILLIGRAM(S): at 14:07

## 2022-12-21 RX ADMIN — POLYETHYLENE GLYCOL 3350 17 GRAM(S): 17 POWDER, FOR SOLUTION ORAL at 17:16

## 2022-12-21 RX ADMIN — Medication 8 UNIT(S): at 14:06

## 2022-12-21 RX ADMIN — ATORVASTATIN CALCIUM 80 MILLIGRAM(S): 80 TABLET, FILM COATED ORAL at 21:23

## 2022-12-21 RX ADMIN — CARVEDILOL PHOSPHATE 12.5 MILLIGRAM(S): 80 CAPSULE, EXTENDED RELEASE ORAL at 17:15

## 2022-12-21 RX ADMIN — FAMOTIDINE 10 MILLIGRAM(S): 10 INJECTION INTRAVENOUS at 14:06

## 2022-12-21 RX ADMIN — GABAPENTIN 300 MILLIGRAM(S): 400 CAPSULE ORAL at 17:15

## 2022-12-21 NOTE — PROGRESS NOTE ADULT - SUBJECTIVE AND OBJECTIVE BOX
Chief complaint    Patient is a 62y old  Male who presents with a chief complaint of fall (21 Dec 2022 08:17)   Review of systems  Patient in bed, appears comfortable.    Labs and Fingersticks  CAPILLARY BLOOD GLUCOSE      POCT Blood Glucose.: 142 mg/dL (21 Dec 2022 08:02)  POCT Blood Glucose.: 181 mg/dL (20 Dec 2022 22:38)  POCT Blood Glucose.: 183 mg/dL (20 Dec 2022 21:15)  POCT Blood Glucose.: 183 mg/dL (20 Dec 2022 17:16)      Anion Gap, Serum: 14 (12-20 @ 06:35)      Calcium, Total Serum: 8.6 (12-20 @ 06:35)          12-20    135  |  93<L>  |  52<H>  ----------------------------<  138<H>  4.0   |  28  |  5.87<H>    Ca    8.6      20 Dec 2022 06:35      Medications  MEDICATIONS  (STANDING):  allopurinol 100 milliGRAM(s) Oral daily  aspirin  chewable 81 milliGRAM(s) Oral daily  atorvastatin 80 milliGRAM(s) Oral at bedtime  carvedilol 12.5 milliGRAM(s) Oral every 12 hours  chlorhexidine 4% Liquid 1 Application(s) Topical daily  clopidogrel Tablet 75 milliGRAM(s) Oral daily  famotidine    Tablet 10 milliGRAM(s) Oral daily  gabapentin 300 milliGRAM(s) Oral two times a day  heparin   Injectable 5000 Unit(s) SubCutaneous every 8 hours  influenza   Vaccine 0.5 milliLiter(s) IntraMuscular once  insulin glargine Injectable (LANTUS) 17 Unit(s) SubCutaneous at bedtime  insulin lispro (ADMELOG) corrective regimen sliding scale   SubCutaneous Before meals and at bedtime  insulin lispro Injectable (ADMELOG) 8 Unit(s) SubCutaneous three times a day before meals  lidocaine/prilocaine Cream 1 Application(s) Topical <User Schedule>  polyethylene glycol 3350 17 Gram(s) Oral two times a day  senna 2 Tablet(s) Oral at bedtime  tamsulosin 0.4 milliGRAM(s) Oral at bedtime  valsartan 80 milliGRAM(s) Oral daily      Physical Exam  General: Patient appears comfortable.  Vital Signs Last 12 Hrs  T(F): 97.5 (12-21-22 @ 12:30), Max: 99 (12-21-22 @ 08:07)  HR: 78 (12-21-22 @ 12:30) (72 - 78)  BP: 128/68 (12-21-22 @ 12:30) (102/59 - 128/68)  BP(mean): --  RR: 18 (12-21-22 @ 12:30) (17 - 18)  SpO2: 96% (12-21-22 @ 12:30) (95% - 96%)  Neck: No palpable thyroid nodules.  CVS: S1S2, No murmurs  Respiratory: No wheezing, no crepitations  GI: Abdomen soft, non tender.  Musculoskeletal:  edema lower extremities.     Diagnostics
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
## 2022-12-21 NOTE — PROGRESS NOTE ADULT - ASSESSMENT
Assessment  DMT2: 62y Male with DM T2 with hyperglycemia admitted with on oral hypoglycemic agents at home, on basal bolus and insulin coverage, blood sugars are improving, remains lethargic, eating partial meals,   SDH: Patient is stable, monitored remains in SICU.  HTN: On antihypertensive medications, monitored, asymptomatic.  CKD: On hemodialysis, labs, chart reviewed.                  Rubén Escoto MD  Cell:  [phone]  Office: [phone]

## 2022-12-21 NOTE — PROGRESS NOTE ADULT - SUBJECTIVE AND OBJECTIVE BOX
Patient seen and examined resting in bed.   no distress    REVIEW OF SYSTEMS:  As per HPI, otherwise 8 full 10 ROS were unremarkable    MEDICATIONS  (STANDING):  allopurinol 100 milliGRAM(s) Oral daily  amLODIPine   Tablet 5 milliGRAM(s) Oral daily  aspirin  chewable 81 milliGRAM(s) Oral daily  atorvastatin 80 milliGRAM(s) Oral at bedtime  carvedilol 12.5 milliGRAM(s) Oral every 12 hours  chlorhexidine 4% Liquid 1 Application(s) Topical daily  famotidine    Tablet 10 milliGRAM(s) Oral daily  gabapentin 300 milliGRAM(s) Oral two times a day  heparin   Injectable 5000 Unit(s) SubCutaneous every 8 hours  influenza   Vaccine 0.5 milliLiter(s) IntraMuscular once  insulin glargine Injectable (LANTUS) 14 Unit(s) SubCutaneous at bedtime  insulin lispro (ADMELOG) corrective regimen sliding scale   SubCutaneous Before meals and at bedtime  insulin lispro Injectable (ADMELOG) 5 Unit(s) SubCutaneous three times a day before meals  levETIRAcetam 500 milliGRAM(s) Oral at bedtime  lidocaine/prilocaine Cream 1 Application(s) Topical <User Schedule>  polyethylene glycol 3350 17 Gram(s) Oral two times a day  senna 2 Tablet(s) Oral at bedtime  tamsulosin 0.4 milliGRAM(s) Oral at bedtime  valsartan 160 milliGRAM(s) Oral daily      VITAL:  T(C): , Max: 37.4 (12-17-22 @ 11:24)  T(F): , Max: 99.3 (12-17-22 @ 11:24)  HR: 83 (12-17-22 @ 11:24)  BP: 107/64 (12-17-22 @ 11:24)  BP(mean): 78 (12-17-22 @ 11:24)  RR: 18 (12-17-22 @ 11:24)  SpO2: 94% (12-17-22 @ 11:24)  Wt(kg): --    I and O's:    12-16 @ 07:01  -  12-17 @ 07:00  --------------------------------------------------------  IN: 480 mL / OUT: 0 mL / NET: 480 mL          PHYSICAL EXAM:    Constitutional: NAD  HEENT: PERRLA, EOMI,  MMM  Neck: No LAD, No JVD  Respiratory: CTAB  Cardiovascular: S1 and S2  Gastrointestinal: BS+, soft, NT/ND  Extremities: No peripheral edema  Neurological: A/O x 3, no focal deficits  Psychiatric: Normal mood, normal affect  : No Jay  Skin: No rashes  Access: +AVF +thrill    LABS:    12-17    137  |  98  |  63<H>  ----------------------------<  110<H>  3.9   |  23  |  5.98<H>    Ca    8.2<L>      17 Dec 2022 07:39  Mg     2.2     12-17        Assessment and Plan:   · Assessment	  62 year-old man with history of multiple medical issues including hypertension, diabetes mellitus, coronary artery diease, congestive heart failure with reduced EF, gout, and ESRD presents from PCP office for fall and is on a/c and has subdural hematoma .      1 Nuerosurg- Off A/C at present;  Started on Keppra   2 Renal-Plan for HD today , midodrine 10 mg b/f hd if bp <90/50 UF 1 liter .  Lytes acceptable  3 CVS- Will maximize fluid removal to aid with pleural effusions.   BP soft        Patient seen and examined resting in bed.   no distress  hd earlier with 1.2 liter UF    REVIEW OF SYSTEMS:  As per HPI, otherwise 8 full 10 ROS were unremarkable    MEDICATIONS  (STANDING):  allopurinol 100 milliGRAM(s) Oral daily  amLODIPine   Tablet 5 milliGRAM(s) Oral daily  aspirin  chewable 81 milliGRAM(s) Oral daily  atorvastatin 80 milliGRAM(s) Oral at bedtime  carvedilol 12.5 milliGRAM(s) Oral every 12 hours  chlorhexidine 4% Liquid 1 Application(s) Topical daily  famotidine    Tablet 10 milliGRAM(s) Oral daily  gabapentin 300 milliGRAM(s) Oral two times a day  heparin   Injectable 5000 Unit(s) SubCutaneous every 8 hours  influenza   Vaccine 0.5 milliLiter(s) IntraMuscular once  insulin glargine Injectable (LANTUS) 14 Unit(s) SubCutaneous at bedtime  insulin lispro (ADMELOG) corrective regimen sliding scale   SubCutaneous Before meals and at bedtime  insulin lispro Injectable (ADMELOG) 5 Unit(s) SubCutaneous three times a day before meals  levETIRAcetam 500 milliGRAM(s) Oral at bedtime  lidocaine/prilocaine Cream 1 Application(s) Topical <User Schedule>  polyethylene glycol 3350 17 Gram(s) Oral two times a day  senna 2 Tablet(s) Oral at bedtime  tamsulosin 0.4 milliGRAM(s) Oral at bedtime  valsartan 160 milliGRAM(s) Oral daily      VITAL:  T(C): , Max: 37.4 (12-17-22 @ 11:24)  T(F): , Max: 99.3 (12-17-22 @ 11:24)  HR: 83 (12-17-22 @ 11:24)  BP: 107/64 (12-17-22 @ 11:24)  BP(mean): 78 (12-17-22 @ 11:24)  RR: 18 (12-17-22 @ 11:24)  SpO2: 94% (12-17-22 @ 11:24)  Wt(kg): --    I and O's:    12-16 @ 07:01  -  12-17 @ 07:00  --------------------------------------------------------  IN: 480 mL / OUT: 0 mL / NET: 480 mL          PHYSICAL EXAM:    Constitutional: NAD  HEENT: PERRLA, EOMI,  MMM  Neck: No LAD, No JVD  Respiratory: CTAB  Cardiovascular: S1 and S2  Gastrointestinal: BS+, soft, NT/ND  Extremities: No peripheral edema  Neurological: A/O x 3, no focal deficits  Psychiatric: Normal mood, normal affect  : No Jay  Skin: No rashes  Access: +AVF +thrill    LABS:    12-17    137  |  98  |  63<H>  ----------------------------<  110<H>  3.9   |  23  |  5.98<H>    Ca    8.2<L>      17 Dec 2022 07:39  Mg     2.2     12-17        Assessment and Plan:   · Assessment	  62 year-old man with history of multiple medical issues including hypertension, diabetes mellitus, coronary artery diease, congestive heart failure with reduced EF, gout, and ESRD presents from PCP office for fall and is on a/c and has subdural hematoma .      1 Nuerosurg- Off A/C at present;  Started on Keppra   2 Renal-Plan for HD today , midodrine 10 mg b/f hd if bp <90/50 UF 1 liter .  Lytes acceptable  3 CVS- Will maximize fluid removal to aid with pleural effusions.   BP soft

## 2022-12-21 NOTE — PROGRESS NOTE ADULT - SUBJECTIVE AND OBJECTIVE BOX
afberile    REVIEW OF SYSTEMS:  CONSTITUTIONAL: No fever, weight loss  ENT:  No difficulty hearing, tinnitus, vertigo  NECK: No pain or stiffness  RESPIRATORY: No cough, wheezing, chills or hemoptysis; No Shortness of Breath  CARDIOVASCULAR: No chest pain, palpitations, dizziness  GASTROINTESTINAL: No abdominal or epigastric pain. No nausea, vomiting, or hematemesis; No diarrhea  No melena or hematochezia.  GENITOURINARY: No dysuria, frequency, hematuria, or incontinence  NEUROLOGICAL: No headaches  SKIN: No itching, burning, rash  LYMPH Nodes: No enlarged glands  ENDOCRINE: No heat or cold intolerance; No hair loss  MUSCULOSKELETAL: No joint pain or swelling  PSYCHIATRIC: No depression, anxiety  HEME/LYMPH: No easy bruising, or bleeding gums  ALLERGY AND IMMUNOLOGIC: No hives or eczema	    MEDICATIONS  (STANDING):  allopurinol 100 milliGRAM(s) Oral daily  aspirin  chewable 81 milliGRAM(s) Oral daily  atorvastatin 80 milliGRAM(s) Oral at bedtime  carvedilol 12.5 milliGRAM(s) Oral every 12 hours  chlorhexidine 4% Liquid 1 Application(s) Topical daily  clopidogrel Tablet 75 milliGRAM(s) Oral daily  famotidine    Tablet 10 milliGRAM(s) Oral daily  gabapentin 300 milliGRAM(s) Oral two times a day  heparin   Injectable 5000 Unit(s) SubCutaneous every 8 hours  influenza   Vaccine 0.5 milliLiter(s) IntraMuscular once  insulin glargine Injectable (LANTUS) 17 Unit(s) SubCutaneous at bedtime  insulin lispro (ADMELOG) corrective regimen sliding scale   SubCutaneous Before meals and at bedtime  insulin lispro Injectable (ADMELOG) 8 Unit(s) SubCutaneous three times a day before meals  lidocaine/prilocaine Cream 1 Application(s) Topical <User Schedule>  polyethylene glycol 3350 17 Gram(s) Oral two times a day  senna 2 Tablet(s) Oral at bedtime  tamsulosin 0.4 milliGRAM(s) Oral at bedtime  valsartan 80 milliGRAM(s) Oral daily    MEDICATIONS  (PRN):  acetaminophen     Tablet .. 650 milliGRAM(s) Oral every 6 hours PRN Temp greater or equal to 38C (100.4F), Mild Pain (1 - 3)      Vital Signs Last 24 Hrs  T(C): 37.2 (21 Dec 2022 08:07), Max: 37.2 (20 Dec 2022 20:32)  T(F): 99 (21 Dec 2022 08:07), Max: 99 (21 Dec 2022 08:07)  HR: 76 (21 Dec 2022 08:07) (67 - 78)  BP: 111/66 (21 Dec 2022 08:07) (98/66 - 112/76)  BP(mean): --  RR: 17 (21 Dec 2022 08:07) (17 - 18)  SpO2: 95% (21 Dec 2022 08:07) (95% - 98%)    Parameters below as of 21 Dec 2022 08:07  Patient On (Oxygen Delivery Method): room air      CAPILLARY BLOOD GLUCOSE      POCT Blood Glucose.: 142 mg/dL (21 Dec 2022 08:02)  POCT Blood Glucose.: 181 mg/dL (20 Dec 2022 22:38)  POCT Blood Glucose.: 183 mg/dL (20 Dec 2022 21:15)  POCT Blood Glucose.: 183 mg/dL (20 Dec 2022 17:16)  POCT Blood Glucose.: 117 mg/dL (20 Dec 2022 12:20)  POCT Blood Glucose.: 162 mg/dL (20 Dec 2022 08:33)    I&O's Summary    20 Dec 2022 07:01  -  21 Dec 2022 07:00  --------------------------------------------------------  IN: 480 mL / OUT: 600 mL / NET: -120 mL          Appearance: Normal	  HEENT:   Normal oral mucosa, PERRL, EOMI	  Lymphatic: No lymphadenopathy  Cardiovascular: Normal S1 S2, No JVD  Respiratory: Lungs clear to auscultation	  Psychiatry: A & O x 3, Mood & affect appropriate  Gastrointestinal:  Soft, Non-tender, + BS	  Skin: No rash, No ecchymoses	  Extremities: Normal range of motion  Vascular: Peripheral pulses palpable bilaterally    LABS:    12-20    135  |  93<L>  |  52<H>  ----------------------------<  138<H>  4.0   |  28  |  5.87<H>    Ca    8.6      20 Dec 2022 06:35                              Consultant(s) Notes Reviewed:      Care Discussed with Consultants/Other Providers:

## 2022-12-22 LAB
GLUCOSE BLDC GLUCOMTR-MCNC: 112 MG/DL — HIGH (ref 70–99)
GLUCOSE BLDC GLUCOMTR-MCNC: 131 MG/DL — HIGH (ref 70–99)
GLUCOSE BLDC GLUCOMTR-MCNC: 195 MG/DL — HIGH (ref 70–99)
GLUCOSE BLDC GLUCOMTR-MCNC: 227 MG/DL — HIGH (ref 70–99)
SARS-COV-2 RNA SPEC QL NAA+PROBE: SIGNIFICANT CHANGE UP

## 2022-12-22 PROCEDURE — 99254 IP/OBS CNSLTJ NEW/EST MOD 60: CPT

## 2022-12-22 PROCEDURE — 70450 CT HEAD/BRAIN W/O DYE: CPT | Mod: 26

## 2022-12-22 PROCEDURE — 71045 X-RAY EXAM CHEST 1 VIEW: CPT | Mod: 26

## 2022-12-22 RX ORDER — VALSARTAN 80 MG/1
40 TABLET ORAL DAILY
Refills: 0 | Status: DISCONTINUED | OUTPATIENT
Start: 2022-12-22 | End: 2022-12-31

## 2022-12-22 RX ADMIN — Medication 81 MILLIGRAM(S): at 13:20

## 2022-12-22 RX ADMIN — TAMSULOSIN HYDROCHLORIDE 0.4 MILLIGRAM(S): 0.4 CAPSULE ORAL at 21:48

## 2022-12-22 RX ADMIN — Medication 4: at 17:58

## 2022-12-22 RX ADMIN — SENNA PLUS 2 TABLET(S): 8.6 TABLET ORAL at 21:48

## 2022-12-22 RX ADMIN — Medication 8 UNIT(S): at 13:17

## 2022-12-22 RX ADMIN — CARVEDILOL PHOSPHATE 12.5 MILLIGRAM(S): 80 CAPSULE, EXTENDED RELEASE ORAL at 09:00

## 2022-12-22 RX ADMIN — POLYETHYLENE GLYCOL 3350 17 GRAM(S): 17 POWDER, FOR SOLUTION ORAL at 17:57

## 2022-12-22 RX ADMIN — HEPARIN SODIUM 5000 UNIT(S): 5000 INJECTION INTRAVENOUS; SUBCUTANEOUS at 21:48

## 2022-12-22 RX ADMIN — Medication 100 MILLIGRAM(S): at 13:18

## 2022-12-22 RX ADMIN — Medication 8 UNIT(S): at 17:58

## 2022-12-22 RX ADMIN — GABAPENTIN 300 MILLIGRAM(S): 400 CAPSULE ORAL at 17:58

## 2022-12-22 RX ADMIN — Medication 8 UNIT(S): at 08:59

## 2022-12-22 RX ADMIN — ATORVASTATIN CALCIUM 80 MILLIGRAM(S): 80 TABLET, FILM COATED ORAL at 21:48

## 2022-12-22 RX ADMIN — HEPARIN SODIUM 5000 UNIT(S): 5000 INJECTION INTRAVENOUS; SUBCUTANEOUS at 13:19

## 2022-12-22 RX ADMIN — GABAPENTIN 300 MILLIGRAM(S): 400 CAPSULE ORAL at 06:02

## 2022-12-22 RX ADMIN — CHLORHEXIDINE GLUCONATE 1 APPLICATION(S): 213 SOLUTION TOPICAL at 10:07

## 2022-12-22 RX ADMIN — HEPARIN SODIUM 5000 UNIT(S): 5000 INJECTION INTRAVENOUS; SUBCUTANEOUS at 06:01

## 2022-12-22 RX ADMIN — VALSARTAN 40 MILLIGRAM(S): 80 TABLET ORAL at 13:18

## 2022-12-22 RX ADMIN — CLOPIDOGREL BISULFATE 75 MILLIGRAM(S): 75 TABLET, FILM COATED ORAL at 13:18

## 2022-12-22 RX ADMIN — Medication 2: at 13:16

## 2022-12-22 RX ADMIN — FAMOTIDINE 10 MILLIGRAM(S): 10 INJECTION INTRAVENOUS at 13:18

## 2022-12-22 RX ADMIN — CARVEDILOL PHOSPHATE 12.5 MILLIGRAM(S): 80 CAPSULE, EXTENDED RELEASE ORAL at 21:48

## 2022-12-22 RX ADMIN — INSULIN GLARGINE 17 UNIT(S): 100 INJECTION, SOLUTION SUBCUTANEOUS at 21:47

## 2022-12-22 RX ADMIN — POLYETHYLENE GLYCOL 3350 17 GRAM(S): 17 POWDER, FOR SOLUTION ORAL at 06:02

## 2022-12-22 NOTE — PROGRESS NOTE ADULT - SUBJECTIVE AND OBJECTIVE BOX
Patient seen and examined resting in bed.   no distress  bp soft    REVIEW OF SYSTEMS:  As per HPI, otherwise 8 full 10 ROS were unremarkable    MEDICATIONS  (STANDING):  allopurinol 100 milliGRAM(s) Oral daily  amLODIPine   Tablet 5 milliGRAM(s) Oral daily  aspirin  chewable 81 milliGRAM(s) Oral daily  atorvastatin 80 milliGRAM(s) Oral at bedtime  carvedilol 12.5 milliGRAM(s) Oral every 12 hours  chlorhexidine 4% Liquid 1 Application(s) Topical daily  famotidine    Tablet 10 milliGRAM(s) Oral daily  gabapentin 300 milliGRAM(s) Oral two times a day  heparin   Injectable 5000 Unit(s) SubCutaneous every 8 hours  influenza   Vaccine 0.5 milliLiter(s) IntraMuscular once  insulin glargine Injectable (LANTUS) 14 Unit(s) SubCutaneous at bedtime  insulin lispro (ADMELOG) corrective regimen sliding scale   SubCutaneous Before meals and at bedtime  insulin lispro Injectable (ADMELOG) 5 Unit(s) SubCutaneous three times a day before meals  levETIRAcetam 500 milliGRAM(s) Oral at bedtime  lidocaine/prilocaine Cream 1 Application(s) Topical <User Schedule>  polyethylene glycol 3350 17 Gram(s) Oral two times a day  senna 2 Tablet(s) Oral at bedtime  tamsulosin 0.4 milliGRAM(s) Oral at bedtime  valsartan 160 milliGRAM(s) Oral daily      VITAL:  T(C): , Max: 37.4 (12-17-22 @ 11:24)  T(F): , Max: 99.3 (12-17-22 @ 11:24)  HR: 83 (12-17-22 @ 11:24)  BP: 107/64 (12-17-22 @ 11:24)  BP(mean): 78 (12-17-22 @ 11:24)  RR: 18 (12-17-22 @ 11:24)  SpO2: 94% (12-17-22 @ 11:24)  Wt(kg): --    I and O's:    12-16 @ 07:01  -  12-17 @ 07:00  --------------------------------------------------------  IN: 480 mL / OUT: 0 mL / NET: 480 mL          PHYSICAL EXAM:    Constitutional: NAD  HEENT: PERRLA, EOMI,  MMM  Neck: No LAD, No JVD  Respiratory: CTAB  Cardiovascular: S1 and S2  Gastrointestinal: BS+, soft, NT/ND  Extremities: No peripheral edema  Neurological: A/O x 3, no focal deficits  Psychiatric: Normal mood, normal affect  : No Jay  Skin: No rashes  Access: +AVF +thrill    LABS:    12-17    137  |  98  |  63<H>  ----------------------------<  110<H>  3.9   |  23  |  5.98<H>    Ca    8.2<L>      17 Dec 2022 07:39  Mg     2.2     12-17        Assessment and Plan:   · Assessment	  62 year-old man with history of multiple medical issues including hypertension, diabetes mellitus, coronary artery diease, congestive heart failure with reduced EF, gout, and ESRD presents from PCP office for fall and is on a/c and has subdural hematoma .      1 Nuerosurg- Off A/C at present;  Started on Keppra   2 Renal-Plan for HD tomorrow , midodrine 10 mg b/f hd if bp <90/50 UF 1 liter .  Lytes acceptable  3 CVS- Will maximize fluid removal to aid with pleural effusions.   BP soft
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
## 2022-12-22 NOTE — CONSULT NOTE ADULT - SUBJECTIVE AND OBJECTIVE BOX
Patient is a 62y old  Male who presents with a chief complaint of fall (22 Dec 2022 13:05)    HPI:  62-year-old male with a past medical history significant for CAD status post PCI, diabetes, hypertension, ESRD on dialysis who presented to the hospital due to knee pain in the setting of a fall.    Work-up obtained on admission revealed a subdural hematoma, evaluated by neurosurgery and trauma service and admitted to the SICU.  No acute surgical intervention was done.  Found to be COVID-positive on admission, old infection.  Also noted to have developed a gout flare of the knee which was evaluated by rheumatology.  Patient was transferred to the floors on 12/15/2022.  Post ICU course was largely uneventful.  Was noted to have urinary retention, undergoing evaluation with rehab medicine.  Continued on dialysis as scheduled.    Patient with initial fevers at admission with T-max of 101.3 Fahrenheit on 12/13/2022, since then has remained afebrile.  Latest available labs on 12/19/2022 shows mild leukocytosis at 10.8.  COVID PCR with positive and then negative test on 12/21/2022 (patient with previously positive test on 11/10/2022.)  Blood cultures have remained negative.  Latest imaging includes CT head on 12/17/2022 showing no significant changes in setting of known subdural hematoma.  Chest x-ray on 12/22/2022 does not show evidence for intrathoracic disease.    Patient known to ID services from previous hospitalization November 2022 when he was treated with a 5-day course of empiric meropenem, remdesivir for 3 days due to COVID, no infectious etiology was established at the time.    prior hospital charts reviewed [  ]  primary team notes reviewed [  ]  other consultant notes reviewed [  ]    PAST MEDICAL & SURGICAL HISTORY:  Diabetes Mellitus Type II, Uncontrolled      Dyslipidemia      s/p Angioplasty with Stent      HTN (hypertension)      Gout      Diabetic retinopathy      GERD (gastroesophageal reflux disease)      Nephrolithiasis      Vitamin D deficiency      Hepatitis      CKD (chronic kidney disease)      Ischemic cardiomyopathy      ASHD (arteriosclerotic heart disease)      Pulmonary hypertension      Myocardial infarction      CAD (coronary artery disease)      BPH (benign prostatic hyperplasia)      Retinal Hemorrhage      S/P coronary artery stent placement  2010 TRICIA to Diag ; 11/18/2010  LAD; 2/4/11 ATOM liberte Diag; 5/15/2017  TRICIA to 1st diag; 6/7/17 PCI with laser and high pressure balloon      History of eye surgery  left eye      H/O lithotripsy      Diabetes with retinopathy  S/P PPV/PRP/GAS  OD 2/22/17 for viterous hemorrhage      CHF with cardiomyopathy  Insertion of Cardiomems monitor      Stented coronary artery          Allergies  No Known Drug Allergies  Seafood (Unknown)  shellfish (Unknown)    ANTIMICROBIALS (past 90 days)  MEDICATIONS  (STANDING):          MEDICATIONS  (STANDING):  acetaminophen     Tablet .. 650 every 6 hours PRN  allopurinol 100 daily  aspirin  chewable 81 daily  atorvastatin 80 at bedtime  carvedilol 12.5 every 12 hours  clopidogrel Tablet 75 daily  famotidine    Tablet 10 daily  gabapentin 300 two times a day  heparin   Injectable 5000 every 8 hours  influenza   Vaccine 0.5 once  insulin glargine Injectable (LANTUS) 17 at bedtime  insulin lispro (ADMELOG) corrective regimen sliding scale  Before meals and at bedtime  insulin lispro Injectable (ADMELOG) 8 three times a day before meals  polyethylene glycol 3350 17 two times a day  senna 2 at bedtime  tamsulosin 0.4 at bedtime  valsartan 40 daily    SOCIAL HISTORY:       FAMILY HISTORY:  Family history of cardiac disorder in father (Father)      REVIEW OF SYSTEMS  [  ] ROS unobtainable because:    [  ] All other systems negative except as noted below:	    Constitutional:  [ ] fever [ ] chills  [ ] weight loss  [ ] weakness  Skin:  [ ] rash [ ] phlebitis	  Eyes: [ ] icterus [ ] pain  [ ] discharge	  ENMT: [ ] sore throat  [ ] thrush [ ] ulcers [ ] exudates  Respiratory: [ ] dyspnea [ ] hemoptysis [ ] cough [ ] sputum	  Cardiovascular:  [ ] chest pain [ ] palpitations [ ] edema	  Gastrointestinal:  [ ] nausea [ ] vomiting [ ] diarrhea [ ] constipation [ ] pain	  Genitourinary:  [ ] dysuria [ ] frequency [ ] hematuria [ ] discharge [ ] flank pain  [ ] incontinence  Musculoskeletal:  [ ] myalgias [ ] arthralgias [ ] arthritis  [ ] back pain  Neurological:  [ ] headache [ ] seizures  [ ] confusion/altered mental status  Psychiatric:  [ ] anxiety [ ] depression	  Hematology/Lymphatics:  [ ] lymphadenopathy  Endocrine:  [ ] adrenal [ ] thyroid  Allergic/Immunologic:	 [ ] transplant [ ] seasonal    Vital Signs Last 24 Hrs  T(F): 98.6 (12-22-22 @ 05:01), Max: 100.2 (12-17-22 @ 20:30)  Vital Signs Last 24 Hrs  HR: 83 (12-22-22 @ 05:01) (76 - 84)  BP: 96/62 (12-22-22 @ 05:01) (96/62 - 126/68)  RR: 18 (12-22-22 @ 05:01)  SpO2: 98% (12-22-22 @ 05:01) (95% - 98%)  Wt(kg): --    PHYSICAL EXAM:  Constitutional: non-toxic, no distress  HEAD/EYES: anicteric, no conjunctival injection  ENT:  supple, no thrush  Cardiovascular:   normal S1, S2, no murmur, no edema  Respiratory:  clear BS bilaterally, no wheezes, no rales  GI:  soft, non-tender, normal bowel sounds  :  no delong, no CVA tenderness  Musculoskeletal:  no synovitis, normal ROM  Neurologic: awake and alert, normal strength, no focal findings  Skin:  no rash, no erythema, no phlebitis  Heme/Onc: no lymphadenopathy   Psychiatric:  awake, alert, appropriate mood            MICROBIOLOGY:              RADIOLOGY:  imaging below personally reviewed and agree with findings Patient is a 62y old  Male who presents with a chief complaint of fall (22 Dec 2022 13:05)    HPI:  62-year-old male with a past medical history significant for CAD status post PCI, diabetes, hypertension, ESRD on dialysis who presented to the hospital due to knee pain in the setting of a fall.    Work-up obtained on admission revealed a subdural hematoma, evaluated by neurosurgery and trauma service and admitted to the SICU.  No acute surgical intervention was done.  Found to be COVID-positive on admission, old infection.  Also noted to have developed a gout flare of the knee which was evaluated by rheumatology.  Patient was transferred to the floors on 12/15/2022.  Post ICU course was largely uneventful.  Was noted to have urinary retention, undergoing evaluation with rehab medicine.  Continued on dialysis as scheduled.    Patient with initial fevers at admission with T-max of 101.3 Fahrenheit on 12/13/2022, since then has remained afebrile.  Latest available labs on 12/19/2022 shows mild leukocytosis at 10.8.  COVID PCR with positive and then negative test on 12/21/2022 (patient with previously positive test on 11/10/2022.)  Blood cultures have remained negative.  Latest imaging includes CT head on 12/17/2022 showing no significant changes in setting of known subdural hematoma.  Chest x-ray on 12/22/2022 does not show evidence for intrathoracic disease.    Patient known to ID services from previous hospitalization November 2022 when he was treated with a 5-day course of empiric meropenem, remdesivir for 3 days due to COVID, no infectious etiology was established at the time.    prior hospital charts reviewed [ x ]  primary team notes reviewed [ x ]  other consultant notes reviewed [ x ]    PAST MEDICAL & SURGICAL HISTORY:  Diabetes Mellitus Type II, Uncontrolled      Dyslipidemia      s/p Angioplasty with Stent      HTN (hypertension)      Gout      Diabetic retinopathy      GERD (gastroesophageal reflux disease)      Nephrolithiasis      Vitamin D deficiency      Hepatitis      CKD (chronic kidney disease)      Ischemic cardiomyopathy      ASHD (arteriosclerotic heart disease)      Pulmonary hypertension      Myocardial infarction      CAD (coronary artery disease)      BPH (benign prostatic hyperplasia)      Retinal Hemorrhage      S/P coronary artery stent placement  2010 TRICIA to Diag ; 11/18/2010  LAD; 2/4/11 ATOM liberte Diag; 5/15/2017  TRICIA to 1st diag; 6/7/17 PCI with laser and high pressure balloon      History of eye surgery  left eye      H/O lithotripsy      Diabetes with retinopathy  S/P PPV/PRP/GAS  OD 2/22/17 for viterous hemorrhage      CHF with cardiomyopathy  Insertion of Cardiomems monitor      Stented coronary artery          Allergies  No Known Drug Allergies  Seafood (Unknown)  shellfish (Unknown)    ANTIMICROBIALS (past 90 days)  MEDICATIONS  (STANDING):          MEDICATIONS  (STANDING):  acetaminophen     Tablet .. 650 every 6 hours PRN  allopurinol 100 daily  aspirin  chewable 81 daily  atorvastatin 80 at bedtime  carvedilol 12.5 every 12 hours  clopidogrel Tablet 75 daily  famotidine    Tablet 10 daily  gabapentin 300 two times a day  heparin   Injectable 5000 every 8 hours  influenza   Vaccine 0.5 once  insulin glargine Injectable (LANTUS) 17 at bedtime  insulin lispro (ADMELOG) corrective regimen sliding scale  Before meals and at bedtime  insulin lispro Injectable (ADMELOG) 8 three times a day before meals  polyethylene glycol 3350 17 two times a day  senna 2 at bedtime  tamsulosin 0.4 at bedtime  valsartan 40 daily    SOCIAL HISTORY:       FAMILY HISTORY:  Family history of cardiac disorder in father (Father)      REVIEW OF SYSTEMS  [  ] ROS unobtainable because:    [ x ] All other systems negative except as noted below:	    Constitutional:  [ ] fever [ ] chills  [ ] weight loss  [ ] weakness  Skin:  [ ] rash [ ] phlebitis	  Eyes: [ ] icterus [ ] pain  [ ] discharge	  ENMT: [ ] sore throat  [ ] thrush [ ] ulcers [ ] exudates  Respiratory: [ ] dyspnea [ ] hemoptysis [ ] cough [ ] sputum	  Cardiovascular:  [ ] chest pain [ ] palpitations [ ] edema	  Gastrointestinal:  [ ] nausea [ ] vomiting [ ] diarrhea [ ] constipation [ ] pain	  Genitourinary:  [ ] dysuria [ ] frequency [ ] hematuria [ ] discharge [ ] flank pain  [ ] incontinence  Musculoskeletal:  [ ] myalgias [ ] arthralgias [ ] arthritis  [ ] back pain  Neurological:  [ ] headache [ ] seizures  [ ] confusion/altered mental status  Psychiatric:  [ ] anxiety [ ] depression	  Hematology/Lymphatics:  [ ] lymphadenopathy  Endocrine:  [ ] adrenal [ ] thyroid  Allergic/Immunologic:	 [ ] transplant [ ] seasonal    Vital Signs Last 24 Hrs  T(F): 98.6 (12-22-22 @ 05:01), Max: 100.2 (12-17-22 @ 20:30)  Vital Signs Last 24 Hrs  HR: 83 (12-22-22 @ 05:01) (76 - 84)  BP: 96/62 (12-22-22 @ 05:01) (96/62 - 126/68)  RR: 18 (12-22-22 @ 05:01)  SpO2: 98% (12-22-22 @ 05:01) (95% - 98%)  Wt(kg): --    PHYSICAL EXAM:  Constitutional: non-toxic, no distress, sleepy but answering questions  HEAD/EYES: anicteric, no conjunctival injection  ENT:  supple, no thrush  Cardiovascular:   normal S1, S2, no murmur, no edema  Respiratory:  clear BS bilaterally, no wheezes, no rales  GI:  soft, non-tender, normal bowel sounds  :  no delong, no CVA tenderness  Musculoskeletal:  no synovitis,  Neurologic: awake and alert, normal strength, no focal findings  Skin:  no rash, no erythema, no phlebitis  Heme/Onc: no lymphadenopathy   Psychiatric:  awake, alert, appropriate mood            MICROBIOLOGY:              RADIOLOGY:  imaging below personally reviewed and agree with findings

## 2022-12-22 NOTE — PROGRESS NOTE ADULT - SUBJECTIVE AND OBJECTIVE BOX
Chief complaint    Patient is a 62y old  Male who presents with a chief complaint of fall (22 Dec 2022 08:24)   Review of systems  Patient in bed, appears comfortable.    Labs and Fingersticks  CAPILLARY BLOOD GLUCOSE      POCT Blood Glucose.: 195 mg/dL (22 Dec 2022 12:47)  POCT Blood Glucose.: 112 mg/dL (22 Dec 2022 08:46)  POCT Blood Glucose.: 121 mg/dL (21 Dec 2022 21:00)  POCT Blood Glucose.: 134 mg/dL (21 Dec 2022 17:13)  POCT Blood Glucose.: 113 mg/dL (21 Dec 2022 13:50)                        Medications  MEDICATIONS  (STANDING):  allopurinol 100 milliGRAM(s) Oral daily  aspirin  chewable 81 milliGRAM(s) Oral daily  atorvastatin 80 milliGRAM(s) Oral at bedtime  carvedilol 12.5 milliGRAM(s) Oral every 12 hours  chlorhexidine 4% Liquid 1 Application(s) Topical daily  clopidogrel Tablet 75 milliGRAM(s) Oral daily  famotidine    Tablet 10 milliGRAM(s) Oral daily  gabapentin 300 milliGRAM(s) Oral two times a day  heparin   Injectable 5000 Unit(s) SubCutaneous every 8 hours  influenza   Vaccine 0.5 milliLiter(s) IntraMuscular once  insulin glargine Injectable (LANTUS) 17 Unit(s) SubCutaneous at bedtime  insulin lispro (ADMELOG) corrective regimen sliding scale   SubCutaneous Before meals and at bedtime  insulin lispro Injectable (ADMELOG) 8 Unit(s) SubCutaneous three times a day before meals  lidocaine/prilocaine Cream 1 Application(s) Topical <User Schedule>  polyethylene glycol 3350 17 Gram(s) Oral two times a day  senna 2 Tablet(s) Oral at bedtime  tamsulosin 0.4 milliGRAM(s) Oral at bedtime  valsartan 40 milliGRAM(s) Oral daily      Physical Exam  General: Patient appears comfortable.  Vital Signs Last 12 Hrs  T(F): 98.6 (12-22-22 @ 05:01), Max: 98.6 (12-22-22 @ 05:01)  HR: 83 (12-22-22 @ 05:01) (83 - 83)  BP: 96/62 (12-22-22 @ 05:01) (96/62 - 96/62)  BP(mean): --  RR: 18 (12-22-22 @ 05:01) (18 - 18)  SpO2: 98% (12-22-22 @ 05:01) (98% - 98%)  Neck: No palpable thyroid nodules.  CVS: S1S2, No murmurs  Respiratory: No wheezing, no crepitations  GI: Abdomen soft, non tender.  Musculoskeletal:  edema lower extremities.     Diagnostics

## 2022-12-22 NOTE — PROGRESS NOTE ADULT - ASSESSMENT
Assessment  DMT2: 62y Male with DM T2 with hyperglycemia admitted with on oral hypoglycemic agents at home, on basal bolus and insulin coverage, blood sugars within acceptable range, eating partial meals,   SDH: Patient is stable, monitored remains in SICU.  HTN: On antihypertensive medications, monitored, asymptomatic.  CKD: On hemodialysis, labs, chart reviewed.                  Rubén Escoto MD  Cell:  [phone]  Office: [phone]

## 2022-12-22 NOTE — CONSULT NOTE ADULT - ASSESSMENT
62-year-old male with a past medical history significant for CAD status post PCI, diabetes, hypertension, ESRD on dialysis who presented to the hospital due to knee pain in the setting of a fall.    #Concern for infectious process  Patient remains afebrile, mild leukocytosis and the setting of subdural hematoma, gout flare and previous steroid use  Patient not providing any localizing symptoms    #Abnormal imaging of the head  #Subdural hematoma  Management per primary team, neurosurgery    Recommendations   62-year-old male with a past medical history significant for CAD status post PCI, diabetes, hypertension, ESRD on dialysis who presented to the hospital due to knee pain in the setting of a fall.    #Concern for infectious process  Patient remains afebrile, mild leukocytosis and the setting of subdural hematoma, gout flare  Patient not providing any localizing symptoms  No evidence for ongoing infectious process at this time    #Abnormal imaging of the head  #Subdural hematoma  Management per primary team, neurosurgery    Recommendations  Would monitor off antibiotics at this time  If develops acute signs of sepsis, blood cultures and  start empiric antibiotics  Monitor fever curve and WBC count    Con Tavarez MD  Divsion of Infectious Diseases  Available on Teams
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
## 2022-12-22 NOTE — PROGRESS NOTE ADULT - SUBJECTIVE AND OBJECTIVE BOX
febriel  REVIEW OF SYSTEMS:  CONSTITUTIONAL: No fever, weight loss  ENT:  No difficulty hearing, tinnitus, vertigo  NECK: No pain or stiffness  RESPIRATORY: No cough, wheezing, chills or hemoptysis; No Shortness of Breath  CARDIOVASCULAR: No chest pain, palpitations, dizziness  GASTROINTESTINAL: No abdominal or epigastric pain. No nausea, vomiting, or hematemesis; No diarrhea  No melena or hematochezia.  GENITOURINARY: No dysuria, frequency, hematuria, or incontinence  NEUROLOGICAL: No headaches  SKIN: No itching, burning, rash  LYMPH Nodes: No enlarged glands  ENDOCRINE: No heat or cold intolerance; No hair loss  MUSCULOSKELETAL: No joint pain or swelling  PSYCHIATRIC: No depression, anxiety  HEME/LYMPH: No easy bruising, or bleeding gums  ALLERGY AND IMMUNOLOGIC: No hives or eczema	    MEDICATIONS  (STANDING):  allopurinol 100 milliGRAM(s) Oral daily  aspirin  chewable 81 milliGRAM(s) Oral daily  atorvastatin 80 milliGRAM(s) Oral at bedtime  carvedilol 12.5 milliGRAM(s) Oral every 12 hours  chlorhexidine 4% Liquid 1 Application(s) Topical daily  clopidogrel Tablet 75 milliGRAM(s) Oral daily  famotidine    Tablet 10 milliGRAM(s) Oral daily  gabapentin 300 milliGRAM(s) Oral two times a day  heparin   Injectable 5000 Unit(s) SubCutaneous every 8 hours  influenza   Vaccine 0.5 milliLiter(s) IntraMuscular once  insulin glargine Injectable (LANTUS) 17 Unit(s) SubCutaneous at bedtime  insulin lispro (ADMELOG) corrective regimen sliding scale   SubCutaneous Before meals and at bedtime  insulin lispro Injectable (ADMELOG) 8 Unit(s) SubCutaneous three times a day before meals  lidocaine/prilocaine Cream 1 Application(s) Topical <User Schedule>  polyethylene glycol 3350 17 Gram(s) Oral two times a day  senna 2 Tablet(s) Oral at bedtime  tamsulosin 0.4 milliGRAM(s) Oral at bedtime  valsartan 40 milliGRAM(s) Oral daily    MEDICATIONS  (PRN):  acetaminophen     Tablet .. 650 milliGRAM(s) Oral every 6 hours PRN Temp greater or equal to 38C (100.4F), Mild Pain (1 - 3)      Vital Signs Last 24 Hrs  T(C): 37 (22 Dec 2022 05:01), Max: 37.8 (21 Dec 2022 23:50)  T(F): 98.6 (22 Dec 2022 05:01), Max: 100 (21 Dec 2022 23:50)  HR: 83 (22 Dec 2022 05:01) (72 - 84)  BP: 96/62 (22 Dec 2022 05:01) (96/62 - 128/68)  BP(mean): --  RR: 18 (22 Dec 2022 05:01) (17 - 19)  SpO2: 98% (22 Dec 2022 05:01) (95% - 98%)    Parameters below as of 22 Dec 2022 05:01  Patient On (Oxygen Delivery Method): room air      CAPILLARY BLOOD GLUCOSE      POCT Blood Glucose.: 121 mg/dL (21 Dec 2022 21:00)  POCT Blood Glucose.: 134 mg/dL (21 Dec 2022 17:13)  POCT Blood Glucose.: 113 mg/dL (21 Dec 2022 13:50)    I&O's Summary    21 Dec 2022 07:01  -  22 Dec 2022 07:00  --------------------------------------------------------  IN: 120 mL / OUT: 1200 mL / NET: -1080 mL          Appearance: Normal	  HEENT:   Normal oral mucosa, PERRL, EOMI	  Lymphatic: No lymphadenopathy  Cardiovascular: Normal S1 S2, No JVD  Respiratory: Lungs clear to auscultation	  Psychiatry: A & O x 3, Mood & affect appropriate  Gastrointestinal:  Soft, Non-tender, + BS	  Skin: No rash, No ecchymoses	  Extremities: Normal range of motion  Vascular: Peripheral pulses palpable bilaterally    LABS:                                  Consultant(s) Notes Reviewed:      Care Discussed with Consultants/Other Providers:

## 2022-12-23 LAB
ANION GAP SERPL CALC-SCNC: 17 MMOL/L — SIGNIFICANT CHANGE UP (ref 5–17)
BUN SERPL-MCNC: 78 MG/DL — HIGH (ref 7–23)
CALCIUM SERPL-MCNC: 9.1 MG/DL — SIGNIFICANT CHANGE UP (ref 8.4–10.5)
CHLORIDE SERPL-SCNC: 93 MMOL/L — LOW (ref 96–108)
CO2 SERPL-SCNC: 25 MMOL/L — SIGNIFICANT CHANGE UP (ref 22–31)
CORTIS AM PEAK SERPL-MCNC: 17.6 UG/DL — SIGNIFICANT CHANGE UP (ref 6–18.4)
CREAT SERPL-MCNC: 8.19 MG/DL — HIGH (ref 0.5–1.3)
EGFR: 7 ML/MIN/1.73M2 — LOW
GLUCOSE BLDC GLUCOMTR-MCNC: 129 MG/DL — HIGH (ref 70–99)
GLUCOSE BLDC GLUCOMTR-MCNC: 148 MG/DL — HIGH (ref 70–99)
GLUCOSE BLDC GLUCOMTR-MCNC: 171 MG/DL — HIGH (ref 70–99)
GLUCOSE BLDC GLUCOMTR-MCNC: 185 MG/DL — HIGH (ref 70–99)
GLUCOSE BLDC GLUCOMTR-MCNC: 198 MG/DL — HIGH (ref 70–99)
GLUCOSE SERPL-MCNC: 194 MG/DL — HIGH (ref 70–99)
POTASSIUM SERPL-MCNC: 4.4 MMOL/L — SIGNIFICANT CHANGE UP (ref 3.5–5.3)
POTASSIUM SERPL-SCNC: 4.4 MMOL/L — SIGNIFICANT CHANGE UP (ref 3.5–5.3)
SODIUM SERPL-SCNC: 135 MMOL/L — SIGNIFICANT CHANGE UP (ref 135–145)

## 2022-12-23 RX ADMIN — VALSARTAN 40 MILLIGRAM(S): 80 TABLET ORAL at 05:53

## 2022-12-23 RX ADMIN — TAMSULOSIN HYDROCHLORIDE 0.4 MILLIGRAM(S): 0.4 CAPSULE ORAL at 21:51

## 2022-12-23 RX ADMIN — POLYETHYLENE GLYCOL 3350 17 GRAM(S): 17 POWDER, FOR SOLUTION ORAL at 18:12

## 2022-12-23 RX ADMIN — Medication 8 UNIT(S): at 08:39

## 2022-12-23 RX ADMIN — POLYETHYLENE GLYCOL 3350 17 GRAM(S): 17 POWDER, FOR SOLUTION ORAL at 05:53

## 2022-12-23 RX ADMIN — SENNA PLUS 2 TABLET(S): 8.6 TABLET ORAL at 21:50

## 2022-12-23 RX ADMIN — CLOPIDOGREL BISULFATE 75 MILLIGRAM(S): 75 TABLET, FILM COATED ORAL at 11:47

## 2022-12-23 RX ADMIN — HEPARIN SODIUM 5000 UNIT(S): 5000 INJECTION INTRAVENOUS; SUBCUTANEOUS at 21:50

## 2022-12-23 RX ADMIN — CARVEDILOL PHOSPHATE 12.5 MILLIGRAM(S): 80 CAPSULE, EXTENDED RELEASE ORAL at 17:49

## 2022-12-23 RX ADMIN — GABAPENTIN 300 MILLIGRAM(S): 400 CAPSULE ORAL at 17:50

## 2022-12-23 RX ADMIN — Medication 81 MILLIGRAM(S): at 11:47

## 2022-12-23 RX ADMIN — CHLORHEXIDINE GLUCONATE 1 APPLICATION(S): 213 SOLUTION TOPICAL at 11:55

## 2022-12-23 RX ADMIN — INSULIN GLARGINE 17 UNIT(S): 100 INJECTION, SOLUTION SUBCUTANEOUS at 21:49

## 2022-12-23 RX ADMIN — HEPARIN SODIUM 5000 UNIT(S): 5000 INJECTION INTRAVENOUS; SUBCUTANEOUS at 05:53

## 2022-12-23 RX ADMIN — LIDOCAINE AND PRILOCAINE CREAM 1 APPLICATION(S): 25; 25 CREAM TOPICAL at 00:37

## 2022-12-23 RX ADMIN — Medication 2: at 08:40

## 2022-12-23 RX ADMIN — Medication 2: at 21:50

## 2022-12-23 RX ADMIN — Medication 100 MILLIGRAM(S): at 11:47

## 2022-12-23 RX ADMIN — GABAPENTIN 300 MILLIGRAM(S): 400 CAPSULE ORAL at 05:53

## 2022-12-23 RX ADMIN — FAMOTIDINE 10 MILLIGRAM(S): 10 INJECTION INTRAVENOUS at 11:47

## 2022-12-23 RX ADMIN — ATORVASTATIN CALCIUM 80 MILLIGRAM(S): 80 TABLET, FILM COATED ORAL at 21:51

## 2022-12-23 RX ADMIN — Medication 8 UNIT(S): at 17:51

## 2022-12-23 RX ADMIN — CARVEDILOL PHOSPHATE 12.5 MILLIGRAM(S): 80 CAPSULE, EXTENDED RELEASE ORAL at 05:53
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
## 2022-12-23 NOTE — PROGRESS NOTE ADULT - ASSESSMENT
Assessment  DMT2: 62y Male with DM T2 with hyperglycemia admitted with on oral hypoglycemic agents at home, on basal bolus and insulin coverage, blood sugars within acceptable range, no hypoglycemic events, eating partial meals,   SDH: Patient is stable, monitored remains in SICU.  HTN: On antihypertensive medications, monitored, asymptomatic.  CKD: On hemodialysis, labs, chart reviewed.                  Rubén Escoto MD  Cell:  [phone]  Office: [phone]

## 2022-12-23 NOTE — PROGRESS NOTE ADULT - SUBJECTIVE AND OBJECTIVE BOX
Chief complaint    Patient is a 62y old  Male who presents with a chief complaint of fall (23 Dec 2022 08:04)   Review of systems  Patient in bed, appears comfortable.    Labs and Fingersticks  CAPILLARY BLOOD GLUCOSE      POCT Blood Glucose.: 171 mg/dL (23 Dec 2022 08:29)  POCT Blood Glucose.: 131 mg/dL (22 Dec 2022 21:30)  POCT Blood Glucose.: 227 mg/dL (22 Dec 2022 17:21)  POCT Blood Glucose.: 195 mg/dL (22 Dec 2022 12:47)                        Medications  MEDICATIONS  (STANDING):  allopurinol 100 milliGRAM(s) Oral daily  aspirin  chewable 81 milliGRAM(s) Oral daily  atorvastatin 80 milliGRAM(s) Oral at bedtime  carvedilol 12.5 milliGRAM(s) Oral every 12 hours  chlorhexidine 4% Liquid 1 Application(s) Topical daily  clopidogrel Tablet 75 milliGRAM(s) Oral daily  famotidine    Tablet 10 milliGRAM(s) Oral daily  gabapentin 300 milliGRAM(s) Oral two times a day  heparin   Injectable 5000 Unit(s) SubCutaneous every 8 hours  influenza   Vaccine 0.5 milliLiter(s) IntraMuscular once  insulin glargine Injectable (LANTUS) 17 Unit(s) SubCutaneous at bedtime  insulin lispro (ADMELOG) corrective regimen sliding scale   SubCutaneous Before meals and at bedtime  insulin lispro Injectable (ADMELOG) 8 Unit(s) SubCutaneous three times a day before meals  lidocaine/prilocaine Cream 1 Application(s) Topical <User Schedule>  polyethylene glycol 3350 17 Gram(s) Oral two times a day  senna 2 Tablet(s) Oral at bedtime  tamsulosin 0.4 milliGRAM(s) Oral at bedtime  valsartan 40 milliGRAM(s) Oral daily      Physical Exam  General: Patient appears comfortable.  Vital Signs Last 12 Hrs  T(F): 98 (12-23-22 @ 11:33), Max: 99.4 (12-23-22 @ 00:54)  HR: 73 (12-23-22 @ 11:33) (73 - 81)  BP: 106/60 (12-23-22 @ 11:33) (106/60 - 156/71)  BP(mean): --  RR: 18 (12-23-22 @ 11:33) (18 - 18)  SpO2: 99% (12-23-22 @ 11:33) (96% - 99%)  Neck: No palpable thyroid nodules.  CVS: S1S2, No murmurs  Respiratory: No wheezing, no crepitations  GI: Abdomen soft, non tender.  Musculoskeletal:  edema lower extremities.     Diagnostics
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
## 2022-12-23 NOTE — PROGRESS NOTE ADULT - SUBJECTIVE AND OBJECTIVE BOX
afberile    REVIEW OF SYSTEMS:  CONSTITUTIONAL: No fever, weight loss  ENT:  No difficulty hearing, tinnitus, vertigo  NECK: No pain or stiffness  RESPIRATORY: No cough, wheezing, chills or hemoptysis; No Shortness of Breath  CARDIOVASCULAR: No chest pain, palpitations, dizziness  GASTROINTESTINAL: No abdominal or epigastric pain. No nausea, vomiting, or hematemesis; No diarrhea  No melena or hematochezia.  GENITOURINARY: No dysuria, frequency, hematuria, or incontinence  NEUROLOGICAL: No headaches  SKIN: No itching, burning, rash  LYMPH Nodes: No enlarged glands  ENDOCRINE: No heat or cold intolerance; No hair loss  MUSCULOSKELETAL: No joint pain or swelling  PSYCHIATRIC: No depression, anxiety  HEME/LYMPH: No easy bruising, or bleeding gums  ALLERGY AND IMMUNOLOGIC: No hives or eczema	    MEDICATIONS  (STANDING):  allopurinol 100 milliGRAM(s) Oral daily  aspirin  chewable 81 milliGRAM(s) Oral daily  atorvastatin 80 milliGRAM(s) Oral at bedtime  carvedilol 12.5 milliGRAM(s) Oral every 12 hours  chlorhexidine 4% Liquid 1 Application(s) Topical daily  clopidogrel Tablet 75 milliGRAM(s) Oral daily  famotidine    Tablet 10 milliGRAM(s) Oral daily  gabapentin 300 milliGRAM(s) Oral two times a day  heparin   Injectable 5000 Unit(s) SubCutaneous every 8 hours  influenza   Vaccine 0.5 milliLiter(s) IntraMuscular once  insulin glargine Injectable (LANTUS) 17 Unit(s) SubCutaneous at bedtime  insulin lispro (ADMELOG) corrective regimen sliding scale   SubCutaneous Before meals and at bedtime  insulin lispro Injectable (ADMELOG) 8 Unit(s) SubCutaneous three times a day before meals  lidocaine/prilocaine Cream 1 Application(s) Topical <User Schedule>  polyethylene glycol 3350 17 Gram(s) Oral two times a day  senna 2 Tablet(s) Oral at bedtime  tamsulosin 0.4 milliGRAM(s) Oral at bedtime  valsartan 40 milliGRAM(s) Oral daily    MEDICATIONS  (PRN):  acetaminophen     Tablet .. 650 milliGRAM(s) Oral every 6 hours PRN Temp greater or equal to 38C (100.4F), Mild Pain (1 - 3)      Vital Signs Last 24 Hrs  T(C): 36.8 (23 Dec 2022 05:19), Max: 37.4 (23 Dec 2022 00:54)  T(F): 98.3 (23 Dec 2022 05:19), Max: 99.4 (23 Dec 2022 00:54)  HR: 81 (23 Dec 2022 05:19) (75 - 83)  BP: 156/71 (23 Dec 2022 05:19) (110/62 - 156/71)  BP(mean): --  RR: 18 (23 Dec 2022 05:19) (18 - 18)  SpO2: 96% (23 Dec 2022 05:19) (96% - 98%)    Parameters below as of 23 Dec 2022 05:19  Patient On (Oxygen Delivery Method): room air      CAPILLARY BLOOD GLUCOSE      POCT Blood Glucose.: 131 mg/dL (22 Dec 2022 21:30)  POCT Blood Glucose.: 227 mg/dL (22 Dec 2022 17:21)  POCT Blood Glucose.: 195 mg/dL (22 Dec 2022 12:47)  POCT Blood Glucose.: 112 mg/dL (22 Dec 2022 08:46)    I&O's Summary    21 Dec 2022 07:01  -  22 Dec 2022 07:00  --------------------------------------------------------  IN: 120 mL / OUT: 1200 mL / NET: -1080 mL          Appearance: Normal	  HEENT:   Normal oral mucosa, PERRL, EOMI	  Lymphatic: No lymphadenopathy  Cardiovascular: Normal S1 S2, No JVD  Respiratory: Lungs clear to auscultation	  Psychiatry: A & O x 3, Mood & affect appropriate  Gastrointestinal:  Soft, Non-tender, + BS	  Skin: No rash, No ecchymoses	  Extremities: Normal range of motion  Vascular: Peripheral pulses palpable bilaterally    LABS:                                  Consultant(s) Notes Reviewed:      Care Discussed with Consultants/Other Providers:

## 2022-12-23 NOTE — PROGRESS NOTE ADULT - SUBJECTIVE AND OBJECTIVE BOX
Patient seen and examined resting in bed.   no distress      REVIEW OF SYSTEMS:  As per HPI, otherwise 8 full 10 ROS were unremarkable    MEDICATIONS  (STANDING):  allopurinol 100 milliGRAM(s) Oral daily  amLODIPine   Tablet 5 milliGRAM(s) Oral daily  aspirin  chewable 81 milliGRAM(s) Oral daily  atorvastatin 80 milliGRAM(s) Oral at bedtime  carvedilol 12.5 milliGRAM(s) Oral every 12 hours  chlorhexidine 4% Liquid 1 Application(s) Topical daily  famotidine    Tablet 10 milliGRAM(s) Oral daily  gabapentin 300 milliGRAM(s) Oral two times a day  heparin   Injectable 5000 Unit(s) SubCutaneous every 8 hours  influenza   Vaccine 0.5 milliLiter(s) IntraMuscular once  insulin glargine Injectable (LANTUS) 14 Unit(s) SubCutaneous at bedtime  insulin lispro (ADMELOG) corrective regimen sliding scale   SubCutaneous Before meals and at bedtime  insulin lispro Injectable (ADMELOG) 5 Unit(s) SubCutaneous three times a day before meals  levETIRAcetam 500 milliGRAM(s) Oral at bedtime  lidocaine/prilocaine Cream 1 Application(s) Topical <User Schedule>  polyethylene glycol 3350 17 Gram(s) Oral two times a day  senna 2 Tablet(s) Oral at bedtime  tamsulosin 0.4 milliGRAM(s) Oral at bedtime  valsartan 160 milliGRAM(s) Oral daily      VITAL:  T(C): , Max: 37.4 (12-17-22 @ 11:24)  T(F): , Max: 99.3 (12-17-22 @ 11:24)  HR: 83 (12-17-22 @ 11:24)  BP: 107/64 (12-17-22 @ 11:24)  BP(mean): 78 (12-17-22 @ 11:24)  RR: 18 (12-17-22 @ 11:24)  SpO2: 94% (12-17-22 @ 11:24)  Wt(kg): --    I and O's:    12-16 @ 07:01  -  12-17 @ 07:00  --------------------------------------------------------  IN: 480 mL / OUT: 0 mL / NET: 480 mL          PHYSICAL EXAM:    Constitutional: NAD  HEENT: PERRLA, EOMI,  MMM  Neck: No LAD, No JVD  Respiratory: CTAB  Cardiovascular: S1 and S2  Gastrointestinal: BS+, soft, NT/ND  Extremities: No peripheral edema  Neurological: A/O x 3, no focal deficits  Psychiatric: Normal mood, normal affect  : No Jay  Skin: No rashes  Access: +AVF +thrill    LABS:    12-17    137  |  98  |  63<H>  ----------------------------<  110<H>  3.9   |  23  |  5.98<H>    Ca    8.2<L>      17 Dec 2022 07:39  Mg     2.2     12-17        Assessment and Plan:   · Assessment	  62 year-old man with history of multiple medical issues including hypertension, diabetes mellitus, coronary artery diease, congestive heart failure with reduced EF, gout, and ESRD presents from PCP office for fall and is on a/c and has subdural hematoma .  ESRD ---TTS     1 Nuerosurg- Off A/C at present;  Started on Keppra   2 Renal-Plan for HD tomorrow  ,UF 1 liter .  Lytes acceptable  3 CVS-  no pleural effusion , amlodipine held , BP soft stable        Patient seen and examined resting in bed.   no distress  seen on dialysis UF 1 liter , tolerated well     REVIEW OF SYSTEMS:  As per HPI, otherwise 8 full 10 ROS were unremarkable    MEDICATIONS  (STANDING):  allopurinol 100 milliGRAM(s) Oral daily  amLODIPine   Tablet 5 milliGRAM(s) Oral daily  aspirin  chewable 81 milliGRAM(s) Oral daily  atorvastatin 80 milliGRAM(s) Oral at bedtime  carvedilol 12.5 milliGRAM(s) Oral every 12 hours  chlorhexidine 4% Liquid 1 Application(s) Topical daily  famotidine    Tablet 10 milliGRAM(s) Oral daily  gabapentin 300 milliGRAM(s) Oral two times a day  heparin   Injectable 5000 Unit(s) SubCutaneous every 8 hours  influenza   Vaccine 0.5 milliLiter(s) IntraMuscular once  insulin glargine Injectable (LANTUS) 14 Unit(s) SubCutaneous at bedtime  insulin lispro (ADMELOG) corrective regimen sliding scale   SubCutaneous Before meals and at bedtime  insulin lispro Injectable (ADMELOG) 5 Unit(s) SubCutaneous three times a day before meals  levETIRAcetam 500 milliGRAM(s) Oral at bedtime  lidocaine/prilocaine Cream 1 Application(s) Topical <User Schedule>  polyethylene glycol 3350 17 Gram(s) Oral two times a day  senna 2 Tablet(s) Oral at bedtime  tamsulosin 0.4 milliGRAM(s) Oral at bedtime  valsartan 160 milliGRAM(s) Oral daily      VITAL:  T(C): , Max: 37.4 (12-17-22 @ 11:24)  T(F): , Max: 99.3 (12-17-22 @ 11:24)  HR: 83 (12-17-22 @ 11:24)  BP: 107/64 (12-17-22 @ 11:24)  BP(mean): 78 (12-17-22 @ 11:24)  RR: 18 (12-17-22 @ 11:24)  SpO2: 94% (12-17-22 @ 11:24)  Wt(kg): --    I and O's:    12-16 @ 07:01  -  12-17 @ 07:00  --------------------------------------------------------  IN: 480 mL / OUT: 0 mL / NET: 480 mL          PHYSICAL EXAM:    Constitutional: NAD  HEENT: PERRLA, EOMI,  MMM  Neck: No LAD, No JVD  Respiratory: CTAB  Cardiovascular: S1 and S2  Gastrointestinal: BS+, soft, NT/ND  Extremities: No peripheral edema  Neurological: A/O x 3, no focal deficits  Psychiatric: Normal mood, normal affect  : No Jay  Skin: No rashes  Access: +AVF +thrill    LABS:    12-17    137  |  98  |  63<H>  ----------------------------<  110<H>  3.9   |  23  |  5.98<H>    Ca    8.2<L>      17 Dec 2022 07:39  Mg     2.2     12-17        Assessment and Plan:   · Assessment	  62 year-old man with history of multiple medical issues including hypertension, diabetes mellitus, coronary artery diease, congestive heart failure with reduced EF, gout, and ESRD presents from PCP office for fall and is on a/c and has subdural hematoma .  ESRD ---TTS     1 Nuerosurg- Off A/C at present;  Started on Keppra   2 Renal-Plan for HD today   ,UF 1 liter .  Lytes acceptable  3 CVS-  no pleural effusion , amlodipine held , BP soft stable           [facility]  Office: [phone]

## 2022-12-24 LAB
ANION GAP SERPL CALC-SCNC: 18 MMOL/L — HIGH (ref 5–17)
BUN SERPL-MCNC: 39 MG/DL — HIGH (ref 7–23)
CALCIUM SERPL-MCNC: 9.9 MG/DL — SIGNIFICANT CHANGE UP (ref 8.4–10.5)
CHLORIDE SERPL-SCNC: 95 MMOL/L — LOW (ref 96–108)
CO2 SERPL-SCNC: 26 MMOL/L — SIGNIFICANT CHANGE UP (ref 22–31)
CREAT SERPL-MCNC: 5.54 MG/DL — HIGH (ref 0.5–1.3)
EGFR: 11 ML/MIN/1.73M2 — LOW
GLUCOSE BLDC GLUCOMTR-MCNC: 136 MG/DL — HIGH (ref 70–99)
GLUCOSE BLDC GLUCOMTR-MCNC: 141 MG/DL — HIGH (ref 70–99)
GLUCOSE BLDC GLUCOMTR-MCNC: 147 MG/DL — HIGH (ref 70–99)
GLUCOSE BLDC GLUCOMTR-MCNC: 155 MG/DL — HIGH (ref 70–99)
GLUCOSE SERPL-MCNC: 134 MG/DL — HIGH (ref 70–99)
HCT VFR BLD CALC: 29.8 % — LOW (ref 39–50)
HGB BLD-MCNC: 9 G/DL — LOW (ref 13–17)
MCHC RBC-ENTMCNC: 26.5 PG — LOW (ref 27–34)
MCHC RBC-ENTMCNC: 30.2 GM/DL — LOW (ref 32–36)
MCV RBC AUTO: 87.9 FL — SIGNIFICANT CHANGE UP (ref 80–100)
NRBC # BLD: 0 /100 WBCS — SIGNIFICANT CHANGE UP (ref 0–0)
PLATELET # BLD AUTO: 436 K/UL — HIGH (ref 150–400)
POTASSIUM SERPL-MCNC: 4.6 MMOL/L — SIGNIFICANT CHANGE UP (ref 3.5–5.3)
POTASSIUM SERPL-SCNC: 4.6 MMOL/L — SIGNIFICANT CHANGE UP (ref 3.5–5.3)
RBC # BLD: 3.39 M/UL — LOW (ref 4.2–5.8)
RBC # FLD: 15.9 % — HIGH (ref 10.3–14.5)
SODIUM SERPL-SCNC: 139 MMOL/L — SIGNIFICANT CHANGE UP (ref 135–145)
WBC # BLD: 11.35 K/UL — HIGH (ref 3.8–10.5)
WBC # FLD AUTO: 11.35 K/UL — HIGH (ref 3.8–10.5)

## 2022-12-24 PROCEDURE — 73030 X-RAY EXAM OF SHOULDER: CPT | Mod: 26,RT

## 2022-12-24 PROCEDURE — 70450 CT HEAD/BRAIN W/O DYE: CPT | Mod: 26

## 2022-12-24 RX ADMIN — GABAPENTIN 300 MILLIGRAM(S): 400 CAPSULE ORAL at 05:17

## 2022-12-24 RX ADMIN — Medication 8 UNIT(S): at 17:36

## 2022-12-24 RX ADMIN — HEPARIN SODIUM 5000 UNIT(S): 5000 INJECTION INTRAVENOUS; SUBCUTANEOUS at 22:07

## 2022-12-24 RX ADMIN — INSULIN GLARGINE 17 UNIT(S): 100 INJECTION, SOLUTION SUBCUTANEOUS at 22:06

## 2022-12-24 RX ADMIN — Medication 81 MILLIGRAM(S): at 11:51

## 2022-12-24 RX ADMIN — CARVEDILOL PHOSPHATE 12.5 MILLIGRAM(S): 80 CAPSULE, EXTENDED RELEASE ORAL at 16:43

## 2022-12-24 RX ADMIN — TAMSULOSIN HYDROCHLORIDE 0.4 MILLIGRAM(S): 0.4 CAPSULE ORAL at 22:07

## 2022-12-24 RX ADMIN — Medication 8 UNIT(S): at 13:16

## 2022-12-24 RX ADMIN — POLYETHYLENE GLYCOL 3350 17 GRAM(S): 17 POWDER, FOR SOLUTION ORAL at 05:16

## 2022-12-24 RX ADMIN — HEPARIN SODIUM 5000 UNIT(S): 5000 INJECTION INTRAVENOUS; SUBCUTANEOUS at 13:16

## 2022-12-24 RX ADMIN — CHLORHEXIDINE GLUCONATE 1 APPLICATION(S): 213 SOLUTION TOPICAL at 11:49

## 2022-12-24 RX ADMIN — CARVEDILOL PHOSPHATE 12.5 MILLIGRAM(S): 80 CAPSULE, EXTENDED RELEASE ORAL at 05:17

## 2022-12-24 RX ADMIN — Medication 650 MILLIGRAM(S): at 12:00

## 2022-12-24 RX ADMIN — ATORVASTATIN CALCIUM 80 MILLIGRAM(S): 80 TABLET, FILM COATED ORAL at 22:07

## 2022-12-24 RX ADMIN — Medication 8 UNIT(S): at 09:09

## 2022-12-24 RX ADMIN — Medication 2: at 13:15

## 2022-12-24 RX ADMIN — SENNA PLUS 2 TABLET(S): 8.6 TABLET ORAL at 22:08

## 2022-12-24 RX ADMIN — CLOPIDOGREL BISULFATE 75 MILLIGRAM(S): 75 TABLET, FILM COATED ORAL at 11:51

## 2022-12-24 RX ADMIN — Medication 100 MILLIGRAM(S): at 11:51

## 2022-12-24 RX ADMIN — Medication 650 MILLIGRAM(S): at 11:52

## 2022-12-24 RX ADMIN — GABAPENTIN 300 MILLIGRAM(S): 400 CAPSULE ORAL at 16:44

## 2022-12-24 RX ADMIN — FAMOTIDINE 10 MILLIGRAM(S): 10 INJECTION INTRAVENOUS at 11:51

## 2022-12-24 RX ADMIN — HEPARIN SODIUM 5000 UNIT(S): 5000 INJECTION INTRAVENOUS; SUBCUTANEOUS at 05:16

## 2022-12-24 RX ADMIN — VALSARTAN 40 MILLIGRAM(S): 80 TABLET ORAL at 05:16

## 2022-12-24 NOTE — SWALLOW BEDSIDE ASSESSMENT ADULT - COMMENTS
12/22: CT Head - Stable 1.2 cm mixed density subdural hematoma along the right convexity. There is extension along the right tentorium and into the posterior interhemispheric fissure, unchanged. There is mass effect resulting in sulcal effacement, without midline shift, similar to prior. Chronic lacunar infarct of the left basal ganglia. Left temporal arachnoid cyst, unchanged from prior MR.    12/22 CXR - clear lungs

## 2022-12-24 NOTE — PROGRESS NOTE ADULT - SUBJECTIVE AND OBJECTIVE BOX
[facility]berile    REVIEW OF SYSTEMS:  CONSTITUTIONAL: No fever, weight loss  ENT:  No difficulty hearing, tinnitus, vertigo  NECK: No pain or stiffness  RESPIRATORY: No cough, wheezing, chills or hemoptysis; No Shortness of Breath  CARDIOVASCULAR: No chest pain, palpitations, dizziness  GASTROINTESTINAL: No abdominal or epigastric pain. No nausea, vomiting, or hematemesis; No diarrhea  No melena or hematochezia.  GENITOURINARY: No dysuria, frequency, hematuria, or incontinence  NEUROLOGICAL: No headaches  SKIN: No itching, burning, rash  LYMPH Nodes: No enlarged glands  ENDOCRINE: No heat or cold intolerance  MUSCULOSKELETAL: No joint pain or swelling  PSYCHIATRIC: No depression, anxiety  HEME/LYMPH: No easy bruising, or bleeding gums  ALLERGY AND IMMUNOLOGIC: No hives or eczema	    MEDICATIONS  (STANDING):  allopurinol 100 milliGRAM(s) Oral daily  aspirin  chewable 81 milliGRAM(s) Oral daily  atorvastatin 80 milliGRAM(s) Oral at bedtime  carvedilol 12.5 milliGRAM(s) Oral every 12 hours  chlorhexidine 4% Liquid 1 Application(s) Topical daily  clopidogrel Tablet 75 milliGRAM(s) Oral daily  famotidine    Tablet 10 milliGRAM(s) Oral daily  gabapentin 300 milliGRAM(s) Oral two times a day  heparin   Injectable 5000 Unit(s) SubCutaneous every 8 hours  influenza   Vaccine 0.5 milliLiter(s) IntraMuscular once  insulin glargine Injectable (LANTUS) 17 Unit(s) SubCutaneous at bedtime  insulin lispro (ADMELOG) corrective regimen sliding scale   SubCutaneous Before meals and at bedtime  insulin lispro Injectable (ADMELOG) 8 Unit(s) SubCutaneous three times a day before meals  lidocaine/prilocaine Cream 1 Application(s) Topical <User Schedule>  polyethylene glycol 3350 17 Gram(s) Oral two times a day  senna 2 Tablet(s) Oral at bedtime  tamsulosin 0.4 milliGRAM(s) Oral at bedtime  valsartan 40 milliGRAM(s) Oral daily    MEDICATIONS  (PRN):  acetaminophen     Tablet .. 650 milliGRAM(s) Oral every 6 hours PRN Temp greater or equal to 38C (100.4F), Mild Pain (1 - 3)      Vital Signs Last 24 Hrs  T(C): 36.4 (24 Dec 2022 05:00), Max: 36.9 (23 Dec 2022 16:58)  T(F): 97.5 (24 Dec 2022 05:00), Max: 98.4 (23 Dec 2022 16:58)  HR: 81 (24 Dec 2022 05:00) (70 - 81)  BP: 149/72 (24 Dec 2022 05:00) (106/60 - 149/72)  BP(mean): --  RR: 18 (24 Dec 2022 05:00) (18 - 18)  SpO2: 96% (24 Dec 2022 05:00) (95% - 99%)    Parameters below as of 24 Dec 2022 05:00  Patient On (Oxygen Delivery Method): room air      CAPILLARY BLOOD GLUCOSE      POCT Blood Glucose.: 185 mg/dL (23 Dec 2022 21:28)  POCT Blood Glucose.: 129 mg/dL (23 Dec 2022 17:07)  POCT Blood Glucose.: 148 mg/dL (23 Dec 2022 14:08)  POCT Blood Glucose.: 198 mg/dL (23 Dec 2022 12:31)  POCT Blood Glucose.: 171 mg/dL (23 Dec 2022 08:29)    I&O's Summary    23 Dec 2022 07:01  -  24 Dec 2022 05:44  --------------------------------------------------------  IN: 1100 mL / OUT: 1800 mL / NET: -700 mL          Appearance: Normal	  HEENT:   Normal oral mucosa, PERRL, EOMI	  Lymphatic: No lymphadenopathy  Cardiovascular: Normal S1 S2, No JVD  Respiratory: Lungs clear to auscultation	  Psychiatry: A & O x 3, Mood & affect appropriate  Gastrointestinal:  Soft, Non-tender, + BS	  Skin: No rash, No ecchymoses	  Extremities: Normal range of motion  Vascular: Peripheral pulses palpable bilaterally    LABS:    12-23    135  |  93<L>  |  78<H>  ----------------------------<  194<H>  4.4   |  25  |  8.19<H>    Ca    9.1      23 Dec 2022 14:19                              Consultant(s) Notes Reviewed:      Care Discussed with Consultants/Other Providers:
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
## 2022-12-24 NOTE — SWALLOW BEDSIDE ASSESSMENT ADULT - ADDITIONAL RECOMMENDATIONS
Monitor for s/s aspiration/laryngeal penetration. If noted:  D/C p.o. intake, provide non-oral nutrition/hydration/meds, and contact this service @ [phone]  Maintain good oral hygiene  LTG: Pt will tolerate the least restrictive diet without overt signs or symptoms of penetration or aspiration

## 2022-12-24 NOTE — PROGRESS NOTE ADULT - ASSESSMENT
Assessment  DMT2: 62y Male with DM T2 with hyperglycemia admitted with on oral hypoglycemic agents at home, on basal bolus and insulin coverage, blood sugars within acceptable range, no hypoglycemic events, eating partial meals,   SDH: Patient is stable, monitored remains in SICU.  HTN: On antihypertensive medications, monitored, asymptomatic.  CKD: On hemodialysis, labs, chart reviewed.                  Rubén Escoto MD  Cell:  [phone]  Office: [phone]

## 2022-12-24 NOTE — PROGRESS NOTE ADULT - SUBJECTIVE AND OBJECTIVE BOX
Chief complaint    Patient is a 62y old  Male who presents with a chief complaint of fall (24 Dec 2022 08:36)   Review of systems  Patient in bed, appears comfortable.    Labs and Fingersticks  CAPILLARY BLOOD GLUCOSE      POCT Blood Glucose.: 155 mg/dL (24 Dec 2022 13:00)  POCT Blood Glucose.: 147 mg/dL (24 Dec 2022 08:57)  POCT Blood Glucose.: 185 mg/dL (23 Dec 2022 21:28)  POCT Blood Glucose.: 129 mg/dL (23 Dec 2022 17:07)      Anion Gap, Serum: 18 *H* (12-24 @ 06:32)  Anion Gap, Serum: 17 (12-23 @ 14:19)      Calcium, Total Serum: 9.9 (12-24 @ 06:32)  Calcium, Total Serum: 9.1 (12-23 @ 14:19)          12-24    139  |  95<L>  |  39<H>  ----------------------------<  134<H>  4.6   |  26  |  5.54<H>    Ca    9.9      24 Dec 2022 06:32                          9.0    11.35 )-----------( 436      ( 24 Dec 2022 06:32 )             29.8     Medications  MEDICATIONS  (STANDING):  allopurinol 100 milliGRAM(s) Oral daily  aspirin  chewable 81 milliGRAM(s) Oral daily  atorvastatin 80 milliGRAM(s) Oral at bedtime  carvedilol 12.5 milliGRAM(s) Oral every 12 hours  chlorhexidine 4% Liquid 1 Application(s) Topical daily  clopidogrel Tablet 75 milliGRAM(s) Oral daily  famotidine    Tablet 10 milliGRAM(s) Oral daily  gabapentin 300 milliGRAM(s) Oral two times a day  heparin   Injectable 5000 Unit(s) SubCutaneous every 8 hours  influenza   Vaccine 0.5 milliLiter(s) IntraMuscular once  insulin glargine Injectable (LANTUS) 17 Unit(s) SubCutaneous at bedtime  insulin lispro (ADMELOG) corrective regimen sliding scale   SubCutaneous Before meals and at bedtime  insulin lispro Injectable (ADMELOG) 8 Unit(s) SubCutaneous three times a day before meals  lidocaine/prilocaine Cream 1 Application(s) Topical <User Schedule>  polyethylene glycol 3350 17 Gram(s) Oral two times a day  senna 2 Tablet(s) Oral at bedtime  tamsulosin 0.4 milliGRAM(s) Oral at bedtime  valsartan 40 milliGRAM(s) Oral daily      Physical Exam  General: Patient appears comfortable.  Vital Signs Last 12 Hrs  T(F): 97.8 (12-24-22 @ 13:00), Max: 97.8 (12-24-22 @ 13:00)  HR: 76 (12-24-22 @ 13:00) (76 - 81)  BP: 136/72 (12-24-22 @ 13:00) (130/70 - 149/72)  BP(mean): --  RR: 18 (12-24-22 @ 13:00) (18 - 18)  SpO2: 96% (12-24-22 @ 13:00) (96% - 96%)  Neck: No palpable thyroid nodules.  CVS: S1S2, No murmurs  Respiratory: No wheezing, no crepitations  GI: Abdomen soft, non tender.  Musculoskeletal:  edema lower extremities.     Diagnostics
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
## 2022-12-24 NOTE — SWALLOW BEDSIDE ASSESSMENT ADULT - ORAL PHASE
Within functional limits +mild post swallow residue remains upon lingual surface and left lateral sulcus; absent awareness of residue/Decreased anterior-posterior movement of the bolus/Delayed oral transit time/Stasis in lateral sulci Delayed oral transit time

## 2022-12-24 NOTE — PROGRESS NOTE ADULT - PROBLEM SELECTOR PLAN 1
Will continue current insulin regimen for now.   Will continue monitoring  blood sugars, will Follow up.  Patient counseled for compliance with consistent low carb diet.

## 2022-12-24 NOTE — SWALLOW BEDSIDE ASSESSMENT ADULT - SLP GENERAL OBSERVATIONS
Pt encountered asleep in bed, able to be roused given verbal and tactile cues. Pt is AA&Ox1 (self only). Pt is able to follow simple 1-step commands only and observed to refuse to answer simple yes/no questions. Suspect behavioral component as pt verbally aggressive at times, stating "what does the date have to do with anything? and telling this SLP "you're really annoying me now."

## 2022-12-24 NOTE — SWALLOW BEDSIDE ASSESSMENT ADULT - ASR SWALLOW LINGUAL MOBILITY
+reduced lateralization to the left/impaired protrusion/impaired anterior elevation/impaired left lateral movement/impaired right lateral movement
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
## 2022-12-25 LAB
ANION GAP SERPL CALC-SCNC: 20 MMOL/L — HIGH (ref 5–17)
BUN SERPL-MCNC: 68 MG/DL — HIGH (ref 7–23)
CALCIUM SERPL-MCNC: 9.5 MG/DL — SIGNIFICANT CHANGE UP (ref 8.4–10.5)
CHLORIDE SERPL-SCNC: 94 MMOL/L — LOW (ref 96–108)
CO2 SERPL-SCNC: 24 MMOL/L — SIGNIFICANT CHANGE UP (ref 22–31)
CREAT SERPL-MCNC: 7.64 MG/DL — HIGH (ref 0.5–1.3)
EGFR: 7 ML/MIN/1.73M2 — LOW
GLUCOSE BLDC GLUCOMTR-MCNC: 107 MG/DL — HIGH (ref 70–99)
GLUCOSE BLDC GLUCOMTR-MCNC: 132 MG/DL — HIGH (ref 70–99)
GLUCOSE BLDC GLUCOMTR-MCNC: 180 MG/DL — HIGH (ref 70–99)
GLUCOSE BLDC GLUCOMTR-MCNC: 201 MG/DL — HIGH (ref 70–99)
GLUCOSE SERPL-MCNC: 139 MG/DL — HIGH (ref 70–99)
HCT VFR BLD CALC: 30.3 % — LOW (ref 39–50)
HGB BLD-MCNC: 9.2 G/DL — LOW (ref 13–17)
MCHC RBC-ENTMCNC: 26.5 PG — LOW (ref 27–34)
MCHC RBC-ENTMCNC: 30.4 GM/DL — LOW (ref 32–36)
MCV RBC AUTO: 87.3 FL — SIGNIFICANT CHANGE UP (ref 80–100)
NRBC # BLD: 0 /100 WBCS — SIGNIFICANT CHANGE UP (ref 0–0)
PLATELET # BLD AUTO: 442 K/UL — HIGH (ref 150–400)
POTASSIUM SERPL-MCNC: 4.9 MMOL/L — SIGNIFICANT CHANGE UP (ref 3.5–5.3)
POTASSIUM SERPL-SCNC: 4.9 MMOL/L — SIGNIFICANT CHANGE UP (ref 3.5–5.3)
RBC # BLD: 3.47 M/UL — LOW (ref 4.2–5.8)
RBC # FLD: 16 % — HIGH (ref 10.3–14.5)
SARS-COV-2 RNA SPEC QL NAA+PROBE: DETECTED
SODIUM SERPL-SCNC: 138 MMOL/L — SIGNIFICANT CHANGE UP (ref 135–145)
WBC # BLD: 12.18 K/UL — HIGH (ref 3.8–10.5)
WBC # FLD AUTO: 12.18 K/UL — HIGH (ref 3.8–10.5)

## 2022-12-25 RX ADMIN — HEPARIN SODIUM 5000 UNIT(S): 5000 INJECTION INTRAVENOUS; SUBCUTANEOUS at 13:15

## 2022-12-25 RX ADMIN — Medication 2: at 09:36

## 2022-12-25 RX ADMIN — CARVEDILOL PHOSPHATE 12.5 MILLIGRAM(S): 80 CAPSULE, EXTENDED RELEASE ORAL at 05:49

## 2022-12-25 RX ADMIN — FAMOTIDINE 10 MILLIGRAM(S): 10 INJECTION INTRAVENOUS at 13:14

## 2022-12-25 RX ADMIN — CHLORHEXIDINE GLUCONATE 1 APPLICATION(S): 213 SOLUTION TOPICAL at 13:15

## 2022-12-25 RX ADMIN — VALSARTAN 40 MILLIGRAM(S): 80 TABLET ORAL at 05:49

## 2022-12-25 RX ADMIN — Medication 8 UNIT(S): at 13:13

## 2022-12-25 RX ADMIN — CARVEDILOL PHOSPHATE 12.5 MILLIGRAM(S): 80 CAPSULE, EXTENDED RELEASE ORAL at 17:59

## 2022-12-25 RX ADMIN — Medication 8 UNIT(S): at 17:58

## 2022-12-25 RX ADMIN — HEPARIN SODIUM 5000 UNIT(S): 5000 INJECTION INTRAVENOUS; SUBCUTANEOUS at 05:48

## 2022-12-25 RX ADMIN — ATORVASTATIN CALCIUM 80 MILLIGRAM(S): 80 TABLET, FILM COATED ORAL at 21:59

## 2022-12-25 RX ADMIN — GABAPENTIN 300 MILLIGRAM(S): 400 CAPSULE ORAL at 05:49

## 2022-12-25 RX ADMIN — Medication 81 MILLIGRAM(S): at 13:14

## 2022-12-25 RX ADMIN — TAMSULOSIN HYDROCHLORIDE 0.4 MILLIGRAM(S): 0.4 CAPSULE ORAL at 21:59

## 2022-12-25 RX ADMIN — Medication 100 MILLIGRAM(S): at 13:14

## 2022-12-25 RX ADMIN — INSULIN GLARGINE 17 UNIT(S): 100 INJECTION, SOLUTION SUBCUTANEOUS at 21:59

## 2022-12-25 RX ADMIN — HEPARIN SODIUM 5000 UNIT(S): 5000 INJECTION INTRAVENOUS; SUBCUTANEOUS at 22:00

## 2022-12-25 RX ADMIN — Medication 4: at 21:59

## 2022-12-25 RX ADMIN — Medication 8 UNIT(S): at 09:36

## 2022-12-25 RX ADMIN — GABAPENTIN 300 MILLIGRAM(S): 400 CAPSULE ORAL at 18:00

## 2022-12-25 RX ADMIN — POLYETHYLENE GLYCOL 3350 17 GRAM(S): 17 POWDER, FOR SOLUTION ORAL at 05:49

## 2022-12-25 RX ADMIN — CLOPIDOGREL BISULFATE 75 MILLIGRAM(S): 75 TABLET, FILM COATED ORAL at 13:14

## 2022-12-25 NOTE — PROGRESS NOTE ADULT - SUBJECTIVE AND OBJECTIVE BOX
REVIEW OF SYSTEMS:  CONSTITUTIONAL: No fever, weight loss  ENT:  No difficulty hearing, tinnitus, vertigo  NECK: No pain or stiffness  RESPIRATORY: No cough, wheezing, chills or hemoptysis; No Shortness of Breath  CARDIOVASCULAR: No chest pain, palpitations, dizziness  GASTROINTESTINAL: No abdominal or epigastric pain. No nausea, vomiting, or hematemesis; No diarrhea  No melena or hematochezia.  GENITOURINARY: No dysuria, frequency, hematuria, or incontinence  NEUROLOGICAL: No headaches  SKIN: No itching, burning, rash  LYMPH Nodes: No enlarged glands  ENDOCRINE: No heat or cold intolerance  MUSCULOSKELETAL: No joint pain or swelling  PSYCHIATRIC: No depression, anxiety  HEME/LYMPH: No easy bruising, or bleeding gums  ALLERGY AND IMMUNOLOGIC: No hives or eczema	    MEDICATIONS  (STANDING):  allopurinol 100 milliGRAM(s) Oral daily  aspirin  chewable 81 milliGRAM(s) Oral daily  atorvastatin 80 milliGRAM(s) Oral at bedtime  carvedilol 12.5 milliGRAM(s) Oral every 12 hours  chlorhexidine 4% Liquid 1 Application(s) Topical daily  clopidogrel Tablet 75 milliGRAM(s) Oral daily  famotidine    Tablet 10 milliGRAM(s) Oral daily  gabapentin 300 milliGRAM(s) Oral two times a day  heparin   Injectable 5000 Unit(s) SubCutaneous every 8 hours  influenza   Vaccine 0.5 milliLiter(s) IntraMuscular once  insulin glargine Injectable (LANTUS) 17 Unit(s) SubCutaneous at bedtime  insulin lispro (ADMELOG) corrective regimen sliding scale   SubCutaneous Before meals and at bedtime  insulin lispro Injectable (ADMELOG) 8 Unit(s) SubCutaneous three times a day before meals  lidocaine/prilocaine Cream 1 Application(s) Topical <User Schedule>  polyethylene glycol 3350 17 Gram(s) Oral two times a day  senna 2 Tablet(s) Oral at bedtime  tamsulosin 0.4 milliGRAM(s) Oral at bedtime  valsartan 40 milliGRAM(s) Oral daily    MEDICATIONS  (PRN):  acetaminophen     Tablet .. 650 milliGRAM(s) Oral every 6 hours PRN Temp greater or equal to 38C (100.4F), Mild Pain (1 - 3)      Vital Signs Last 24 Hrs  T(C): 36.7 (25 Dec 2022 04:59), Max: 36.9 (24 Dec 2022 21:01)  T(F): 98 (25 Dec 2022 04:59), Max: 98.5 (24 Dec 2022 21:01)  HR: 82 (25 Dec 2022 04:59) (76 - 87)  BP: 146/82 (25 Dec 2022 04:59) (123/70 - 146/82)  BP(mean): --  RR: 18 (25 Dec 2022 04:59) (18 - 18)  SpO2: 99% (25 Dec 2022 04:59) (96% - 99%)    Parameters below as of 25 Dec 2022 04:59  Patient On (Oxygen Delivery Method): room air      CAPILLARY BLOOD GLUCOSE      POCT Blood Glucose.: 141 mg/dL (24 Dec 2022 21:58)  POCT Blood Glucose.: 136 mg/dL (24 Dec 2022 17:25)  POCT Blood Glucose.: 155 mg/dL (24 Dec 2022 13:00)  POCT Blood Glucose.: 147 mg/dL (24 Dec 2022 08:57)    I&O's Summary    23 Dec 2022 07:01  -  24 Dec 2022 07:00  --------------------------------------------------------  IN: 1100 mL / OUT: 1800 mL / NET: -700 mL      afberile    Appearance: Normal	  HEENT:   Normal oral mucosa, PERRL, EOMI	  Lymphatic: No lymphadenopathy  Cardiovascular: Normal S1 S2, No JVD  Respiratory: Lungs clear to auscultation	  Psychiatry: A & O x 3, Mood & affect appropriate  Gastrointestinal:  Soft, Non-tender, + BS	  Skin: No rash, No ecchymoses	  Extremities: Normal range of motion  Vascular: Peripheral pulses palpable bilaterally    LABS:                        9.0    11.35 )-----------( 436      ( 24 Dec 2022 06:32 )             29.8     12-24    139  |  95<L>  |  39<H>  ----------------------------<  134<H>  4.6   |  26  |  5.54<H>    Ca    9.9      24 Dec 2022 06:32                              Consultant(s) Notes Reviewed:      Care Discussed with Consultants/Other Providers:
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
## 2022-12-25 NOTE — PROGRESS NOTE ADULT - SUBJECTIVE AND OBJECTIVE BOX
Chief complaint    Patient is a 62y old  Male who presents with a chief complaint of fall (25 Dec 2022 09:38)   Review of systems  Patient in bed, appears comfortable.    Labs and Fingersticks  CAPILLARY BLOOD GLUCOSE      POCT Blood Glucose.: 180 mg/dL (25 Dec 2022 08:47)  POCT Blood Glucose.: 141 mg/dL (24 Dec 2022 21:58)  POCT Blood Glucose.: 136 mg/dL (24 Dec 2022 17:25)  POCT Blood Glucose.: 155 mg/dL (24 Dec 2022 13:00)      Anion Gap, Serum: 20 *H* (12-25 @ 07:03)  Anion Gap, Serum: 18 *H* (12-24 @ 06:32)  Anion Gap, Serum: 17 (12-23 @ 14:19)      Calcium, Total Serum: 9.5 (12-25 @ 07:03)  Calcium, Total Serum: 9.9 (12-24 @ 06:32)  Calcium, Total Serum: 9.1 (12-23 @ 14:19)          12-25    138  |  94<L>  |  68<H>  ----------------------------<  139<H>  4.9   |  24  |  7.64<H>    Ca    9.5      25 Dec 2022 07:03                          9.2    12.18 )-----------( 442      ( 25 Dec 2022 07:06 )             30.3     Medications  MEDICATIONS  (STANDING):  allopurinol 100 milliGRAM(s) Oral daily  aspirin  chewable 81 milliGRAM(s) Oral daily  atorvastatin 80 milliGRAM(s) Oral at bedtime  carvedilol 12.5 milliGRAM(s) Oral every 12 hours  chlorhexidine 4% Liquid 1 Application(s) Topical daily  clopidogrel Tablet 75 milliGRAM(s) Oral daily  famotidine    Tablet 10 milliGRAM(s) Oral daily  gabapentin 300 milliGRAM(s) Oral two times a day  heparin   Injectable 5000 Unit(s) SubCutaneous every 8 hours  influenza   Vaccine 0.5 milliLiter(s) IntraMuscular once  insulin glargine Injectable (LANTUS) 17 Unit(s) SubCutaneous at bedtime  insulin lispro (ADMELOG) corrective regimen sliding scale   SubCutaneous Before meals and at bedtime  insulin lispro Injectable (ADMELOG) 8 Unit(s) SubCutaneous three times a day before meals  lidocaine/prilocaine Cream 1 Application(s) Topical <User Schedule>  polyethylene glycol 3350 17 Gram(s) Oral two times a day  senna 2 Tablet(s) Oral at bedtime  tamsulosin 0.4 milliGRAM(s) Oral at bedtime  valsartan 40 milliGRAM(s) Oral daily      Physical Exam  General: Patient appears comfortable.  Vital Signs Last 12 Hrs  T(F): 98 (12-25-22 @ 04:59), Max: 98 (12-25-22 @ 04:59)  HR: 82 (12-25-22 @ 04:59) (82 - 82)  BP: 146/82 (12-25-22 @ 04:59) (146/82 - 146/82)  BP(mean): --  RR: 18 (12-25-22 @ 04:59) (18 - 18)  SpO2: 99% (12-25-22 @ 04:59) (99% - 99%)  Neck: No palpable thyroid nodules.  CVS: S1S2, No murmurs  Respiratory: No wheezing, no crepitations  GI: Abdomen soft, non tender.  Musculoskeletal:  edema lower extremities.     Diagnostics

## 2022-12-25 NOTE — PROGRESS NOTE ADULT - ASSESSMENT
Assessment  DMT2: 62y Male with DM T2 with hyperglycemia admitted with on oral hypoglycemic agents at home, on basal bolus and insulin coverage, blood sugars trending down, no hypoglycemic events, eating partial meals,   SDH: Patient is stable, monitored remains in SICU.  HTN: On antihypertensive medications, monitored, asymptomatic.  CKD: On hemodialysis, labs, chart reviewed.                  Rubén Escoto MD  Cell:  [phone]  Office: [phone]

## 2022-12-25 NOTE — DIETITIAN NUTRITION RISK NOTIFICATION - TREATMENT: THE FOLLOWING DIET HAS BEEN RECOMMENDED
Diet, Minced and Moist:   Consistent Carbohydrate {No Snacks} (CSTCHO) (12-24-22 @ 13:15) [Active]

## 2022-12-26 LAB
ANION GAP SERPL CALC-SCNC: 21 MMOL/L — HIGH (ref 5–17)
BUN SERPL-MCNC: 87 MG/DL — HIGH (ref 7–23)
CALCIUM SERPL-MCNC: 9.7 MG/DL — SIGNIFICANT CHANGE UP (ref 8.4–10.5)
CHLORIDE SERPL-SCNC: 93 MMOL/L — LOW (ref 96–108)
CO2 SERPL-SCNC: 22 MMOL/L — SIGNIFICANT CHANGE UP (ref 22–31)
CREAT SERPL-MCNC: 8.58 MG/DL — HIGH (ref 0.5–1.3)
EGFR: 6 ML/MIN/1.73M2 — LOW
GLUCOSE BLDC GLUCOMTR-MCNC: 108 MG/DL — HIGH (ref 70–99)
GLUCOSE BLDC GLUCOMTR-MCNC: 122 MG/DL — HIGH (ref 70–99)
GLUCOSE BLDC GLUCOMTR-MCNC: 209 MG/DL — HIGH (ref 70–99)
GLUCOSE SERPL-MCNC: 106 MG/DL — HIGH (ref 70–99)
HCT VFR BLD CALC: 28.1 % — LOW (ref 39–50)
HGB BLD-MCNC: 8.6 G/DL — LOW (ref 13–17)
MCHC RBC-ENTMCNC: 26.8 PG — LOW (ref 27–34)
MCHC RBC-ENTMCNC: 30.6 GM/DL — LOW (ref 32–36)
MCV RBC AUTO: 87.5 FL — SIGNIFICANT CHANGE UP (ref 80–100)
NRBC # BLD: 0 /100 WBCS — SIGNIFICANT CHANGE UP (ref 0–0)
PLATELET # BLD AUTO: 436 K/UL — HIGH (ref 150–400)
POTASSIUM SERPL-MCNC: 5.3 MMOL/L — SIGNIFICANT CHANGE UP (ref 3.5–5.3)
POTASSIUM SERPL-SCNC: 5.3 MMOL/L — SIGNIFICANT CHANGE UP (ref 3.5–5.3)
RBC # BLD: 3.21 M/UL — LOW (ref 4.2–5.8)
RBC # FLD: 16.1 % — HIGH (ref 10.3–14.5)
SARS-COV-2 RNA SPEC QL NAA+PROBE: SIGNIFICANT CHANGE UP
SODIUM SERPL-SCNC: 136 MMOL/L — SIGNIFICANT CHANGE UP (ref 135–145)
WBC # BLD: 11.15 K/UL — HIGH (ref 3.8–10.5)
WBC # FLD AUTO: 11.15 K/UL — HIGH (ref 3.8–10.5)

## 2022-12-26 RX ADMIN — TAMSULOSIN HYDROCHLORIDE 0.4 MILLIGRAM(S): 0.4 CAPSULE ORAL at 21:58

## 2022-12-26 RX ADMIN — Medication 8 UNIT(S): at 17:35

## 2022-12-26 RX ADMIN — Medication 8 UNIT(S): at 12:50

## 2022-12-26 RX ADMIN — Medication 650 MILLIGRAM(S): at 10:30

## 2022-12-26 RX ADMIN — Medication 650 MILLIGRAM(S): at 10:12

## 2022-12-26 RX ADMIN — HEPARIN SODIUM 5000 UNIT(S): 5000 INJECTION INTRAVENOUS; SUBCUTANEOUS at 06:08

## 2022-12-26 RX ADMIN — CARVEDILOL PHOSPHATE 12.5 MILLIGRAM(S): 80 CAPSULE, EXTENDED RELEASE ORAL at 06:09

## 2022-12-26 RX ADMIN — ATORVASTATIN CALCIUM 80 MILLIGRAM(S): 80 TABLET, FILM COATED ORAL at 21:58

## 2022-12-26 RX ADMIN — CLOPIDOGREL BISULFATE 75 MILLIGRAM(S): 75 TABLET, FILM COATED ORAL at 12:52

## 2022-12-26 RX ADMIN — POLYETHYLENE GLYCOL 3350 17 GRAM(S): 17 POWDER, FOR SOLUTION ORAL at 17:35

## 2022-12-26 RX ADMIN — FAMOTIDINE 10 MILLIGRAM(S): 10 INJECTION INTRAVENOUS at 12:52

## 2022-12-26 RX ADMIN — CHLORHEXIDINE GLUCONATE 1 APPLICATION(S): 213 SOLUTION TOPICAL at 13:32

## 2022-12-26 RX ADMIN — LIDOCAINE AND PRILOCAINE CREAM 1 APPLICATION(S): 25; 25 CREAM TOPICAL at 00:03

## 2022-12-26 RX ADMIN — Medication 4: at 21:57

## 2022-12-26 RX ADMIN — HEPARIN SODIUM 5000 UNIT(S): 5000 INJECTION INTRAVENOUS; SUBCUTANEOUS at 13:37

## 2022-12-26 RX ADMIN — GABAPENTIN 300 MILLIGRAM(S): 400 CAPSULE ORAL at 17:37

## 2022-12-26 RX ADMIN — HEPARIN SODIUM 5000 UNIT(S): 5000 INJECTION INTRAVENOUS; SUBCUTANEOUS at 21:57

## 2022-12-26 RX ADMIN — Medication 81 MILLIGRAM(S): at 12:52

## 2022-12-26 RX ADMIN — GABAPENTIN 300 MILLIGRAM(S): 400 CAPSULE ORAL at 06:09

## 2022-12-26 RX ADMIN — Medication 100 MILLIGRAM(S): at 12:52

## 2022-12-26 RX ADMIN — INSULIN GLARGINE 17 UNIT(S): 100 INJECTION, SOLUTION SUBCUTANEOUS at 21:57

## 2022-12-26 RX ADMIN — VALSARTAN 40 MILLIGRAM(S): 80 TABLET ORAL at 06:09

## 2022-12-26 RX ADMIN — CARVEDILOL PHOSPHATE 12.5 MILLIGRAM(S): 80 CAPSULE, EXTENDED RELEASE ORAL at 17:35

## 2022-12-26 NOTE — PROGRESS NOTE ADULT - ASSESSMENT
Assessment  DMT2: 62y Male with DM T2 with hyperglycemia admitted with on oral hypoglycemic agents at home, on basal bolus and insulin coverage, blood sugars trending within overall acceptable range with intermittent elevation, no hypoglycemic episodes, no acute events.  SDH: Patient is stable, monitored.  HTN: On antihypertensive medications, monitored, asymptomatic.  CKD: On hemodialysis, labs, chart reviewed.              Rubén Escoto MD  Cell:  [phone]  Office: [phone]               Assessment  DMT2: 62y Male with DM T2 with hyperglycemia admitted with on oral hypoglycemic agents at home, on basal bolus and  insulin coverage, blood sugars trending within overall acceptable range with intermittent elevation, no hypoglycemic episodes, no acute events.  SDH: Patient is stable, monitored.  HTN: On antihypertensive medications, monitored, asymptomatic.  CKD: On hemodialysis, labs, chart reviewed.              Rubén Escoto MD  Cell:  [phone]  Office: [phone]
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
## 2022-12-26 NOTE — PROGRESS NOTE ADULT - NS ATTEND AMEND GEN_ALL_CORE FT
Chart, labs, vitals, radiology reviewed. Above H&P reviewed and edited where appropriate. Agree with history and physical exam. Agree with assessment and plan. I reviewed the overnight course of events and discussed the care with the patient/ family.  35 minutes spent on total encounter of which more than fifty percent of the encounter was spent counseling and/or coordinating care by the attending physician.

## 2022-12-26 NOTE — PROGRESS NOTE ADULT - SUBJECTIVE AND OBJECTIVE BOX
Chief complaint  Patient is a 62y old  Male who presents with a chief complaint of fall (26 Dec 2022 07:35)   Review of systems  No hypoglycemic episodes.    Labs and Fingersticks  CAPILLARY BLOOD GLUCOSE      POCT Blood Glucose.: 201 mg/dL (25 Dec 2022 21:43)  POCT Blood Glucose.: 107 mg/dL (25 Dec 2022 17:37)  POCT Blood Glucose.: 132 mg/dL (25 Dec 2022 12:44)      Anion Gap, Serum: 21 *H* (12-26 @ 06:56)  Anion Gap, Serum: 20 *H* (12-25 @ 07:03)      Calcium, Total Serum: 9.7 (12-26 @ 06:56)  Calcium, Total Serum: 9.5 (12-25 @ 07:03)          12-26    136  |  93<L>  |  87<H>  ----------------------------<  106<H>  5.3   |  22  |  8.58<H>    Ca    9.7      26 Dec 2022 06:56                          8.6    11.15 )-----------( 436      ( 26 Dec 2022 06:56 )             28.1     Medications  MEDICATIONS  (STANDING):  allopurinol 100 milliGRAM(s) Oral daily  aspirin  chewable 81 milliGRAM(s) Oral daily  atorvastatin 80 milliGRAM(s) Oral at bedtime  carvedilol 12.5 milliGRAM(s) Oral every 12 hours  chlorhexidine 4% Liquid 1 Application(s) Topical daily  clopidogrel Tablet 75 milliGRAM(s) Oral daily  famotidine    Tablet 10 milliGRAM(s) Oral daily  gabapentin 300 milliGRAM(s) Oral two times a day  heparin   Injectable 5000 Unit(s) SubCutaneous every 8 hours  influenza   Vaccine 0.5 milliLiter(s) IntraMuscular once  insulin glargine Injectable (LANTUS) 17 Unit(s) SubCutaneous at bedtime  insulin lispro (ADMELOG) corrective regimen sliding scale   SubCutaneous Before meals and at bedtime  insulin lispro Injectable (ADMELOG) 8 Unit(s) SubCutaneous three times a day before meals  lidocaine/prilocaine Cream 1 Application(s) Topical <User Schedule>  polyethylene glycol 3350 17 Gram(s) Oral two times a day  senna 2 Tablet(s) Oral at bedtime  tamsulosin 0.4 milliGRAM(s) Oral at bedtime  valsartan 40 milliGRAM(s) Oral daily      Physical Exam  General: Patient comfortable in bed  Vital Signs Last 12 Hrs  T(F): 98.5 (12-26-22 @ 11:50), Max: 98.5 (12-26-22 @ 11:50)  HR: 78 (12-26-22 @ 11:50) (78 - 87)  BP: 116/50 (12-26-22 @ 11:50) (106/55 - 137/63)  BP(mean): --  RR: 18 (12-26-22 @ 11:50) (18 - 20)  SpO2: 98% (12-26-22 @ 11:50) (97% - 99%)         Chief complaint  Patient is a 62y old  Male who presents with a chief complaint of fall (26 Dec 2022 07:35)   Review of systems  No hypoglycemic episodes.    Labs and Fingersticks  CAPILLARY BLOOD GLUCOSE      POCT Blood Glucose.: 201 mg/dL (25 Dec 2022 21:43)  POCT Blood Glucose.: 107 mg/dL (25 Dec 2022 17:37)  POCT Blood Glucose.: 132 mg/dL (25 Dec 2022 12:44)      Anion Gap, Serum: 21 *H* (12-26 @ 06:56)  Anion Gap, Serum: 20 *H* (12-25 @ 07:03)      Calcium, Total Serum: 9.7 (12-26 @ 06:56)  Calcium, Total Serum: 9.5 (12-25 @ 07:03)          12-26    136  |  93<L>  |  87<H>  ----------------------------<  106<H>  5.3   |  22  |  8.58<H>    Ca    9.7      26 Dec 2022 06:56                          8.6    11.15 )-----------( 436      ( 26 Dec 2022 06:56 )             28.1     Medications  MEDICATIONS  (STANDING):  allopurinol 100 milliGRAM(s) Oral daily  aspirin  chewable 81 milliGRAM(s) Oral daily  atorvastatin 80 milliGRAM(s) Oral at bedtime  carvedilol 12.5 milliGRAM(s) Oral every 12 hours  chlorhexidine 4% Liquid 1 Application(s) Topical daily  clopidogrel Tablet 75 milliGRAM(s) Oral daily  famotidine    Tablet 10 milliGRAM(s) Oral daily  gabapentin 300 milliGRAM(s) Oral two times a day  heparin   Injectable 5000 Unit(s) SubCutaneous every 8 hours  influenza   Vaccine 0.5 milliLiter(s) IntraMuscular once  insulin glargine Injectable (LANTUS) 17 Unit(s) SubCutaneous at bedtime  insulin lispro (ADMELOG) corrective regimen sliding scale   SubCutaneous Before meals and at bedtime  insulin lispro Injectable (ADMELOG) 8 Unit(s) SubCutaneous three times a day before meals  lidocaine/prilocaine Cream 1 Application(s) Topical <User Schedule>  polyethylene glycol 3350 17 Gram(s) Oral two times a day  senna 2 Tablet(s) Oral at bedtime  tamsulosin 0.4 milliGRAM(s) Oral at bedtime  valsartan 40 milliGRAM(s) Oral daily      Physical Exam  General: Patient comfortable in bed  Vital Signs Last 12 Hrs  T(F): 98.5 (12-26-22 @ 11:50), Max: 98.5 (12-26-22 @ 11:50)  HR: 78 (12-26-22 @ 11:50) (78 - 87)  BP: 116/50 (12-26-22 @ 11:50) (106/55 - 137/63)  BP(mean): --  RR: 18 (12-26-22 @ 11:50) (18 - 20)  SpO2: 98% (12-26-22 @ 11:50) (97% - 99%)

## 2022-12-26 NOTE — PROGRESS NOTE ADULT - SUBJECTIVE AND OBJECTIVE BOX
[facility]  REVIEW OF SYSTEMS:  CONSTITUTIONAL: No fever, weight loss  ENT:  No difficulty hearing, tinnitus, vertigo  NECK: No pain or stiffness  RESPIRATORY: No cough, wheezing, chills or hemoptysis; No Shortness of Breath  CARDIOVASCULAR: No chest pain, palpitations, dizziness  GASTROINTESTINAL: No abdominal or epigastric pain. No nausea, vomiting, or hematemesis; No diarrhea  No melena or hematochezia.  GENITOURINARY: No dysuria, frequency, hematuria, or incontinence  NEUROLOGICAL: No headaches  SKIN: No itching, burning, rash  LYMPH Nodes: No enlarged glands  ENDOCRINE: No heat or cold intolerance  MUSCULOSKELETAL: No joint pain or swelling  PSYCHIATRIC: No depression, anxiety  HEME/LYMPH: No easy bruising, or bleeding gums  ALLERGY AND IMMUNOLOGIC: No hives or eczema	    MEDICATIONS  (STANDING):  allopurinol 100 milliGRAM(s) Oral daily  aspirin  chewable 81 milliGRAM(s) Oral daily  atorvastatin 80 milliGRAM(s) Oral at bedtime  carvedilol 12.5 milliGRAM(s) Oral every 12 hours  chlorhexidine 4% Liquid 1 Application(s) Topical daily  clopidogrel Tablet 75 milliGRAM(s) Oral daily  famotidine    Tablet 10 milliGRAM(s) Oral daily  gabapentin 300 milliGRAM(s) Oral two times a day  heparin   Injectable 5000 Unit(s) SubCutaneous every 8 hours  influenza   Vaccine 0.5 milliLiter(s) IntraMuscular once  insulin glargine Injectable (LANTUS) 17 Unit(s) SubCutaneous at bedtime  insulin lispro (ADMELOG) corrective regimen sliding scale   SubCutaneous Before meals and at bedtime  insulin lispro Injectable (ADMELOG) 8 Unit(s) SubCutaneous three times a day before meals  lidocaine/prilocaine Cream 1 Application(s) Topical <User Schedule>  polyethylene glycol 3350 17 Gram(s) Oral two times a day  senna 2 Tablet(s) Oral at bedtime  tamsulosin 0.4 milliGRAM(s) Oral at bedtime  valsartan 40 milliGRAM(s) Oral daily    MEDICATIONS  (PRN):  acetaminophen     Tablet .. 650 milliGRAM(s) Oral every 6 hours PRN Temp greater or equal to 38C (100.4F), Mild Pain (1 - 3)      Vital Signs Last 24 Hrs  T(C): 36.8 (26 Dec 2022 05:03), Max: 37 (25 Dec 2022 13:07)  T(F): 98.3 (26 Dec 2022 05:03), Max: 98.6 (25 Dec 2022 13:07)  HR: 87 (26 Dec 2022 05:03) (85 - 92)  BP: 120/65 (26 Dec 2022 05:03) (120/65 - 155/73)  BP(mean): --  RR: 18 (26 Dec 2022 05:03) (18 - 18)  SpO2: 99% (26 Dec 2022 05:03) (95% - 99%)    Parameters below as of 26 Dec 2022 05:03  Patient On (Oxygen Delivery Method): room air      CAPILLARY BLOOD GLUCOSE      POCT Blood Glucose.: 201 mg/dL (25 Dec 2022 21:43)  POCT Blood Glucose.: 107 mg/dL (25 Dec 2022 17:37)  POCT Blood Glucose.: 132 mg/dL (25 Dec 2022 12:44)  POCT Blood Glucose.: 180 mg/dL (25 Dec 2022 08:47)    I&O's Summary    25 Dec 2022 07:01  -  26 Dec 2022 05:40  --------------------------------------------------------  IN: 120 mL / OUT: 0 mL / NET: 120 mL          Appearance: Normal	  HEENT:   Normal oral mucosa, PERRL, EOMI	  Lymphatic: No lymphadenopathy  Cardiovascular: Normal S1 S2, No JVD  Respiratory: Lungs clear to auscultation	  Psychiatry: A & O x 3, Mood & affect appropriate  Gastrointestinal:  Soft, Non-tender, + BS	  Skin: No rash, No ecchymoses	  Extremities: Normal range of motion  Vascular: Peripheral pulses palpable bilaterally    LABS:                        9.2    12.18 )-----------( 442      ( 25 Dec 2022 07:06 )             30.3     12-25    138  |  94<L>  |  68<H>  ----------------------------<  139<H>  4.9   |  24  |  7.64<H>    Ca    9.5      25 Dec 2022 07:03                              Consultant(s) Notes Reviewed:      Care Discussed with Consultants/Other Providers:

## 2022-12-27 LAB
GLUCOSE BLDC GLUCOMTR-MCNC: 104 MG/DL — HIGH (ref 70–99)
GLUCOSE BLDC GLUCOMTR-MCNC: 138 MG/DL — HIGH (ref 70–99)
GLUCOSE BLDC GLUCOMTR-MCNC: 78 MG/DL — SIGNIFICANT CHANGE UP (ref 70–99)
GLUCOSE BLDC GLUCOMTR-MCNC: 80 MG/DL — SIGNIFICANT CHANGE UP (ref 70–99)
GLUCOSE BLDC GLUCOMTR-MCNC: 90 MG/DL — SIGNIFICANT CHANGE UP (ref 70–99)

## 2022-12-27 RX ORDER — INSULIN LISPRO 100/ML
6 VIAL (ML) SUBCUTANEOUS
Refills: 0 | Status: DISCONTINUED | OUTPATIENT
Start: 2022-12-27 | End: 2022-12-30

## 2022-12-27 RX ORDER — GABAPENTIN 400 MG/1
200 CAPSULE ORAL
Refills: 0 | Status: DISCONTINUED | OUTPATIENT
Start: 2022-12-27 | End: 2022-12-31

## 2022-12-27 RX ADMIN — FAMOTIDINE 10 MILLIGRAM(S): 10 INJECTION INTRAVENOUS at 10:28

## 2022-12-27 RX ADMIN — VALSARTAN 40 MILLIGRAM(S): 80 TABLET ORAL at 10:29

## 2022-12-27 RX ADMIN — CARVEDILOL PHOSPHATE 12.5 MILLIGRAM(S): 80 CAPSULE, EXTENDED RELEASE ORAL at 21:45

## 2022-12-27 RX ADMIN — HEPARIN SODIUM 5000 UNIT(S): 5000 INJECTION INTRAVENOUS; SUBCUTANEOUS at 21:45

## 2022-12-27 RX ADMIN — CARVEDILOL PHOSPHATE 12.5 MILLIGRAM(S): 80 CAPSULE, EXTENDED RELEASE ORAL at 10:31

## 2022-12-27 RX ADMIN — HEPARIN SODIUM 5000 UNIT(S): 5000 INJECTION INTRAVENOUS; SUBCUTANEOUS at 05:54

## 2022-12-27 RX ADMIN — TAMSULOSIN HYDROCHLORIDE 0.4 MILLIGRAM(S): 0.4 CAPSULE ORAL at 21:45

## 2022-12-27 RX ADMIN — Medication 8 UNIT(S): at 13:01

## 2022-12-27 RX ADMIN — Medication 8 UNIT(S): at 09:30

## 2022-12-27 RX ADMIN — GABAPENTIN 200 MILLIGRAM(S): 400 CAPSULE ORAL at 17:28

## 2022-12-27 RX ADMIN — HEPARIN SODIUM 5000 UNIT(S): 5000 INJECTION INTRAVENOUS; SUBCUTANEOUS at 13:09

## 2022-12-27 RX ADMIN — CHLORHEXIDINE GLUCONATE 1 APPLICATION(S): 213 SOLUTION TOPICAL at 12:22

## 2022-12-27 RX ADMIN — Medication 100 MILLIGRAM(S): at 10:27

## 2022-12-27 RX ADMIN — POLYETHYLENE GLYCOL 3350 17 GRAM(S): 17 POWDER, FOR SOLUTION ORAL at 05:55

## 2022-12-27 RX ADMIN — INSULIN GLARGINE 17 UNIT(S): 100 INJECTION, SOLUTION SUBCUTANEOUS at 21:45

## 2022-12-27 RX ADMIN — POLYETHYLENE GLYCOL 3350 17 GRAM(S): 17 POWDER, FOR SOLUTION ORAL at 16:56

## 2022-12-27 RX ADMIN — Medication 6 UNIT(S): at 17:28

## 2022-12-27 RX ADMIN — ATORVASTATIN CALCIUM 80 MILLIGRAM(S): 80 TABLET, FILM COATED ORAL at 21:45

## 2022-12-27 RX ADMIN — Medication 81 MILLIGRAM(S): at 10:27

## 2022-12-27 RX ADMIN — CLOPIDOGREL BISULFATE 75 MILLIGRAM(S): 75 TABLET, FILM COATED ORAL at 10:29

## 2022-12-27 RX ADMIN — GABAPENTIN 200 MILLIGRAM(S): 400 CAPSULE ORAL at 05:56

## 2022-12-27 RX ADMIN — SENNA PLUS 2 TABLET(S): 8.6 TABLET ORAL at 21:45

## 2022-12-27 RX ADMIN — LIDOCAINE AND PRILOCAINE CREAM 1 APPLICATION(S): 25; 25 CREAM TOPICAL at 00:50
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
## 2022-12-27 NOTE — PROGRESS NOTE ADULT - ASSESSMENT
Assessment  DMT2: 62y Male with DM T2 with hyperglycemia admitted with on oral hypoglycemic agents at home, now on basal bolus insulin, blood sugars are trending within overall acceptable range, no hypoglycemic episodes, eating meals, no acute events.  SDH: Patient is stable, monitored.  HTN: On antihypertensive medications, monitored, asymptomatic.  CKD: On hemodialysis, labs, chart reviewed.              Rubén Escoto MD  Cell:  [phone]  Office: [phone]               Assessment  DMT2: 62y Male with DM T2 with hyperglycemia admitted with on oral hypoglycemic agents at home, now on basal bolus insulin, blood sugars are in overall acceptable range/trending down postprandially this afternoon, no hypoglycemic episodes, eating meals, no acute events.  SDH: Patient is stable, monitored.  HTN: On antihypertensive medications, monitored, asymptomatic.  CKD: On hemodialysis, labs, chart reviewed.              Rubén Escoto MD  Cell:  [phone]  Office: [phone]               Assessment  DMT2: 62y Male with DM T2 with hyperglycemia admitted with on oral hypoglycemic agents at home, now on basal bolus insulin, blood sugars are in overall acceptable range/trending down postprandially this afternoon, no  hypoglycemic episodes, eating meals, no acute events.  SDH: Patient is stable, monitored.  HTN: On antihypertensive medications, monitored, asymptomatic.  CKD: On hemodialysis, labs, chart reviewed.              Rubén Escoto MD  Cell:  [phone]  Office: [phone]

## 2022-12-27 NOTE — PROGRESS NOTE ADULT - SUBJECTIVE AND OBJECTIVE BOX
Chief complaint  Patient is a 62y old  Male who presents with a chief complaint of fall (27 Dec 2022 09:40)   Review of systems  Patient in bed, looks comfortable, no hypoglycemic episodes.    Labs and Fingersticks  CAPILLARY BLOOD GLUCOSE      POCT Blood Glucose.: 104 mg/dL (27 Dec 2022 09:01)  POCT Blood Glucose.: 209 mg/dL (26 Dec 2022 21:07)  POCT Blood Glucose.: 122 mg/dL (26 Dec 2022 17:21)  POCT Blood Glucose.: 108 mg/dL (26 Dec 2022 12:44)      Anion Gap, Serum: 21 *H* (12-26 @ 06:56)      Calcium, Total Serum: 9.7 (12-26 @ 06:56)          12-26    136  |  93<L>  |  87<H>  ----------------------------<  106<H>  5.3   |  22  |  8.58<H>    Ca    9.7      26 Dec 2022 06:56                          8.6    11.15 )-----------( 436      ( 26 Dec 2022 06:56 )             28.1     Medications  MEDICATIONS  (STANDING):  allopurinol 100 milliGRAM(s) Oral daily  aspirin  chewable 81 milliGRAM(s) Oral daily  atorvastatin 80 milliGRAM(s) Oral at bedtime  carvedilol 12.5 milliGRAM(s) Oral every 12 hours  chlorhexidine 4% Liquid 1 Application(s) Topical daily  clopidogrel Tablet 75 milliGRAM(s) Oral daily  famotidine    Tablet 10 milliGRAM(s) Oral daily  gabapentin 200 milliGRAM(s) Oral two times a day  heparin   Injectable 5000 Unit(s) SubCutaneous every 8 hours  influenza   Vaccine 0.5 milliLiter(s) IntraMuscular once  insulin glargine Injectable (LANTUS) 17 Unit(s) SubCutaneous at bedtime  insulin lispro (ADMELOG) corrective regimen sliding scale   SubCutaneous Before meals and at bedtime  insulin lispro Injectable (ADMELOG) 8 Unit(s) SubCutaneous three times a day before meals  lidocaine/prilocaine Cream 1 Application(s) Topical <User Schedule>  polyethylene glycol 3350 17 Gram(s) Oral two times a day  senna 2 Tablet(s) Oral at bedtime  tamsulosin 0.4 milliGRAM(s) Oral at bedtime  valsartan 40 milliGRAM(s) Oral daily      Physical Exam  General: Patient comfortable in bed  Vital Signs Last 12 Hrs  T(F): 98.1 (12-27-22 @ 11:53), Max: 98.1 (12-27-22 @ 05:43)  HR: 80 (12-27-22 @ 11:53) (80 - 91)  BP: 142/76 (12-27-22 @ 11:53) (142/75 - 142/76)  BP(mean): --  RR: 18 (12-27-22 @ 11:53) (18 - 18)  SpO2: 96% (12-27-22 @ 11:53) (96% - 96%)  Neck: No palpable thyroid nodules.  CVS: S1S2, No murmurs  Respiratory: No wheezing, no crepitations  GI: Abdomen soft, bowel sounds positive  Musculoskeletal:  edema lower extremities.   Skin: No skin rashes, no ecchymosis    Diagnostics             Chief complaint  Patient is a 62y old  Male who presents with a chief complaint of fall (27 Dec 2022 09:40)   Review of systems  Patient in bed, looks comfortable, no hypoglycemic episodes.    Labs and Fingersticks  CAPILLARY BLOOD GLUCOSE      POCT Blood Glucose.: 104 mg/dL (27 Dec 2022 09:01)  POCT Blood Glucose.: 209 mg/dL (26 Dec 2022 21:07)  POCT Blood Glucose.: 122 mg/dL (26 Dec 2022 17:21)  POCT Blood Glucose.: 108 mg/dL (26 Dec 2022 12:44)      Anion Gap, Serum: 21 *H* (12-26 @ 06:56)      Calcium, Total Serum: 9.7 (12-26 @ 06:56)          12-26    136  |  93<L>  |  87<H>  ----------------------------<  106<H>  5.3   |  22  |  8.58<H>    Ca    9.7      26 Dec 2022 06:56                          8.6    11.15 )-----------( 436      ( 26 Dec 2022 06:56 )             28.1     Medications  MEDICATIONS  (STANDING):  allopurinol 100 milliGRAM(s) Oral daily  aspirin  chewable 81 milliGRAM(s) Oral daily  atorvastatin 80 milliGRAM(s) Oral at bedtime  carvedilol 12.5 milliGRAM(s) Oral every 12 hours  chlorhexidine 4% Liquid 1 Application(s) Topical daily  clopidogrel Tablet 75 milliGRAM(s) Oral daily  famotidine    Tablet 10 milliGRAM(s) Oral daily  gabapentin 200 milliGRAM(s) Oral two times a day  heparin   Injectable 5000 Unit(s) SubCutaneous every 8 hours  influenza   Vaccine 0.5 milliLiter(s) IntraMuscular once  insulin glargine Injectable (LANTUS) 17 Unit(s) SubCutaneous at bedtime  insulin lispro (ADMELOG) corrective regimen sliding scale   SubCutaneous Before meals and at bedtime  insulin lispro Injectable (ADMELOG) 8 Unit(s) SubCutaneous three times a day before meals  lidocaine/prilocaine Cream 1 Application(s) Topical <User Schedule>  polyethylene glycol 3350 17 Gram(s) Oral two times a day  senna 2 Tablet(s) Oral at bedtime  tamsulosin 0.4 milliGRAM(s) Oral at bedtime  valsartan 40 milliGRAM(s) Oral daily      Physical Exam  General: Patient comfortable in bed  Vital Signs Last 12 Hrs  T(F): 98.1 (12-27-22 @ 11:53), Max: 98.1 (12-27-22 @ 05:43)  HR: 80 (12-27-22 @ 11:53) (80 - 91)  BP: 142/76 (12-27-22 @ 11:53) (142/75 - 142/76)  BP(mean): --  RR: 18 (12-27-22 @ 11:53) (18 - 18)  SpO2: 96% (12-27-22 @ 11:53) (96% - 96%)  Neck: No palpable thyroid nodules.  CVS: S1S2, No murmurs  Respiratory: No wheezing, no crepitations  GI: Abdomen soft, bowel sounds positive  Musculoskeletal:  edema lower extremities.   Skin: No skin rashes, no ecchymosis    Diagnostics

## 2022-12-27 NOTE — PROGRESS NOTE ADULT - PROBLEM SELECTOR PLAN 1
Will continue current insulin regimen for now. Will continue monitoring FS, log, and FU.  Patient counseled for compliance with consistent low carb diet and exercise as tolerated outpatient. Will continue Lantus 17u at bedtime.  Will decrease Admelog to 6u before each meal and continue Admelog correction scale coverage. Will continue monitoring FS and FU.  Patient counseled for compliance with consistent low carb diet and exercise as tolerated outpatient.

## 2022-12-27 NOTE — PROGRESS NOTE ADULT - SUBJECTIVE AND OBJECTIVE BOX
Patient seen and examined resting in bed.   no distress      REVIEW OF SYSTEMS:  As per HPI, otherwise 8 full 10 ROS were unremarkable    MEDICATIONS  (STANDING):  allopurinol 100 milliGRAM(s) Oral daily  amLODIPine   Tablet 5 milliGRAM(s) Oral daily  aspirin  chewable 81 milliGRAM(s) Oral daily  atorvastatin 80 milliGRAM(s) Oral at bedtime  carvedilol 12.5 milliGRAM(s) Oral every 12 hours  chlorhexidine 4% Liquid 1 Application(s) Topical daily  famotidine    Tablet 10 milliGRAM(s) Oral daily  gabapentin 300 milliGRAM(s) Oral two times a day  heparin   Injectable 5000 Unit(s) SubCutaneous every 8 hours  influenza   Vaccine 0.5 milliLiter(s) IntraMuscular once  insulin glargine Injectable (LANTUS) 14 Unit(s) SubCutaneous at bedtime  insulin lispro (ADMELOG) corrective regimen sliding scale   SubCutaneous Before meals and at bedtime  insulin lispro Injectable (ADMELOG) 5 Unit(s) SubCutaneous three times a day before meals  levETIRAcetam 500 milliGRAM(s) Oral at bedtime  lidocaine/prilocaine Cream 1 Application(s) Topical <User Schedule>  polyethylene glycol 3350 17 Gram(s) Oral two times a day  senna 2 Tablet(s) Oral at bedtime  tamsulosin 0.4 milliGRAM(s) Oral at bedtime  valsartan 160 milliGRAM(s) Oral daily      VITAL:  T(C): , Max: 37.4 (12-17-22 @ 11:24)  T(F): , Max: 99.3 (12-17-22 @ 11:24)  HR: 83 (12-17-22 @ 11:24)  BP: 107/64 (12-17-22 @ 11:24)  BP(mean): 78 (12-17-22 @ 11:24)  RR: 18 (12-17-22 @ 11:24)  SpO2: 94% (12-17-22 @ 11:24)  Wt(kg): --    I and O's:    12-16 @ 07:01  -  12-17 @ 07:00  --------------------------------------------------------  IN: 480 mL / OUT: 0 mL / NET: 480 mL          PHYSICAL EXAM:    Constitutional: NAD  HEENT: PERRLA, EOMI,  MMM  Neck: No LAD, No JVD  Respiratory: CTAB  Cardiovascular: S1 and S2  Gastrointestinal: BS+, soft, NT/ND  Extremities: No peripheral edema  Neurological: A/O x 3, no focal deficits  Psychiatric: Normal mood, normal affect  : No Jay  Skin: No rashes  Access: +AVF +thrill    LABS:    12-17    137  |  98  |  63<H>  ----------------------------<  110<H>  3.9   |  23  |  5.98<H>    Ca    8.2<L>      17 Dec 2022 07:39  Mg     2.2     12-17        Assessment and Plan:   · Assessment	  62 year-old man with history of multiple medical issues including hypertension, diabetes mellitus, coronary artery diease, congestive heart failure with reduced EF, gout, and ESRD presents from PCP office for fall and is on a/c and has subdural hematoma .  ESRD ---TTS     1 Nuerosurg- Off A/C at present;  Started on Keppra   2 Renal-Plan for HD tomorrow   ,UF 1 liter .  Lytes acceptable  3 CVS-  no pleural effusion , amlodipine held , BP soft stable           [facility]  Office: [phone]
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
## 2022-12-27 NOTE — PROGRESS NOTE ADULT - SUBJECTIVE AND OBJECTIVE BOX
afberile  REVIEW OF SYSTEMS:  CONSTITUTIONAL: No fever,  no  weight loss  ENT:  No  tinnitus,   no   vertigo  NECK: No pain or stiffness  RESPIRATORY: No cough, wheezing, chills or hemoptysis;    No Shortness of Breath  CARDIOVASCULAR: No chest pain, palpitations, dizziness  GASTROINTESTINAL: No abdominal or epigastric pain. No nausea, vomiting, or hematemesis; No diarrhea  No melena or hematochezia.  GENITOURINARY: No dysuria, frequency, hematuria, or incontinence  NEUROLOGICAL: No headaches  SKIN: No itching,  no   rash  LYMPH Nodes: No enlarged glands  ENDOCRINE: No heat or cold intolerance  MUSCULOSKELETAL: No joint pain or swelling  PSYCHIATRIC: No depression, anxiety  HEME/LYMPH: No easy bruising, or bleeding gums  ALLERGY AND IMMUNOLOGIC: No hives or eczema	    MEDICATIONS  (STANDING):  allopurinol 100 milliGRAM(s) Oral daily  aspirin  chewable 81 milliGRAM(s) Oral daily  atorvastatin 80 milliGRAM(s) Oral at bedtime  carvedilol 12.5 milliGRAM(s) Oral every 12 hours  chlorhexidine 4% Liquid 1 Application(s) Topical daily  clopidogrel Tablet 75 milliGRAM(s) Oral daily  famotidine    Tablet 10 milliGRAM(s) Oral daily  gabapentin 200 milliGRAM(s) Oral two times a day  heparin   Injectable 5000 Unit(s) SubCutaneous every 8 hours  influenza   Vaccine 0.5 milliLiter(s) IntraMuscular once  insulin glargine Injectable (LANTUS) 17 Unit(s) SubCutaneous at bedtime  insulin lispro (ADMELOG) corrective regimen sliding scale   SubCutaneous Before meals and at bedtime  insulin lispro Injectable (ADMELOG) 8 Unit(s) SubCutaneous three times a day before meals  lidocaine/prilocaine Cream 1 Application(s) Topical <User Schedule>  polyethylene glycol 3350 17 Gram(s) Oral two times a day  senna 2 Tablet(s) Oral at bedtime  tamsulosin 0.4 milliGRAM(s) Oral at bedtime  valsartan 40 milliGRAM(s) Oral daily    MEDICATIONS  (PRN):  acetaminophen     Tablet .. 650 milliGRAM(s) Oral every 6 hours PRN Temp greater or equal to 38C (100.4F), Mild Pain (1 - 3)      Vital Signs Last 24 Hrs  T(C): 36.7 (27 Dec 2022 05:43), Max: 37.1 (26 Dec 2022 20:02)  T(F): 98.1 (27 Dec 2022 05:43), Max: 98.7 (26 Dec 2022 20:02)  HR: 91 (27 Dec 2022 05:43) (78 - 91)  BP: 142/75 (27 Dec 2022 05:43) (106/55 - 145/81)  BP(mean): --  RR: 18 (27 Dec 2022 05:43) (18 - 20)  SpO2: 96% (27 Dec 2022 05:43) (96% - 98%)    Parameters below as of 27 Dec 2022 05:43  Patient On (Oxygen Delivery Method): room air      CAPILLARY BLOOD GLUCOSE      POCT Blood Glucose.: 209 mg/dL (26 Dec 2022 21:07)  POCT Blood Glucose.: 122 mg/dL (26 Dec 2022 17:21)  POCT Blood Glucose.: 108 mg/dL (26 Dec 2022 12:44)    I&O's Summary    25 Dec 2022 07:01  -  26 Dec 2022 07:00  --------------------------------------------------------  IN: 120 mL / OUT: 0 mL / NET: 120 mL    26 Dec 2022 07:01  -  27 Dec 2022 06:33  --------------------------------------------------------  IN: 260 mL / OUT: 0 mL / NET: 260 mL          Appearance: Normal	  HEENT:   Normal oral mucosa, PERRL, EOMI	  Lymphatic: No lymphadenopathy  Cardiovascular: Normal S1 S2, No JVD  Respiratory: Lungs clear to auscultation	  Psychiatry: A & O x 3, Mood & affect appropriate  Gastrointestinal:  Soft, Non-tender, + BS	  Skin: No rash, No ecchymoses	  Extremities: Normal range of motion  Vascular: Peripheral pulses palpable bilaterally    LABS:                        8.6    11.15 )-----------( 436      ( 26 Dec 2022 06:56 )             28.1     12-26    136  |  93<L>  |  87<H>  ----------------------------<  106<H>  5.3   |  22  |  8.58<H>    Ca    9.7      26 Dec 2022 06:56                              Consultant(s) Notes Reviewed:      Care Discussed with Consultants/Other Providers:

## 2022-12-28 LAB
ANION GAP SERPL CALC-SCNC: 17 MMOL/L — SIGNIFICANT CHANGE UP (ref 5–17)
BUN SERPL-MCNC: 73 MG/DL — HIGH (ref 7–23)
CALCIUM SERPL-MCNC: 9.2 MG/DL — SIGNIFICANT CHANGE UP (ref 8.4–10.5)
CHLORIDE SERPL-SCNC: 97 MMOL/L — SIGNIFICANT CHANGE UP (ref 96–108)
CO2 SERPL-SCNC: 25 MMOL/L — SIGNIFICANT CHANGE UP (ref 22–31)
CREAT SERPL-MCNC: 7.13 MG/DL — HIGH (ref 0.5–1.3)
EGFR: 8 ML/MIN/1.73M2 — LOW
GLUCOSE BLDC GLUCOMTR-MCNC: 101 MG/DL — HIGH (ref 70–99)
GLUCOSE BLDC GLUCOMTR-MCNC: 129 MG/DL — HIGH (ref 70–99)
GLUCOSE BLDC GLUCOMTR-MCNC: 137 MG/DL — HIGH (ref 70–99)
GLUCOSE BLDC GLUCOMTR-MCNC: 173 MG/DL — HIGH (ref 70–99)
GLUCOSE SERPL-MCNC: 95 MG/DL — SIGNIFICANT CHANGE UP (ref 70–99)
HCT VFR BLD CALC: 25.8 % — LOW (ref 39–50)
HGB BLD-MCNC: 7.7 G/DL — LOW (ref 13–17)
MCHC RBC-ENTMCNC: 26 PG — LOW (ref 27–34)
MCHC RBC-ENTMCNC: 29.8 GM/DL — LOW (ref 32–36)
MCV RBC AUTO: 87.2 FL — SIGNIFICANT CHANGE UP (ref 80–100)
NRBC # BLD: 0 /100 WBCS — SIGNIFICANT CHANGE UP (ref 0–0)
PLATELET # BLD AUTO: 399 K/UL — SIGNIFICANT CHANGE UP (ref 150–400)
POTASSIUM SERPL-MCNC: 4.8 MMOL/L — SIGNIFICANT CHANGE UP (ref 3.5–5.3)
POTASSIUM SERPL-SCNC: 4.8 MMOL/L — SIGNIFICANT CHANGE UP (ref 3.5–5.3)
RBC # BLD: 2.96 M/UL — LOW (ref 4.2–5.8)
RBC # FLD: 15.9 % — HIGH (ref 10.3–14.5)
SODIUM SERPL-SCNC: 139 MMOL/L — SIGNIFICANT CHANGE UP (ref 135–145)
WBC # BLD: 11.27 K/UL — HIGH (ref 3.8–10.5)
WBC # FLD AUTO: 11.27 K/UL — HIGH (ref 3.8–10.5)

## 2022-12-28 PROCEDURE — 99255 IP/OBS CONSLTJ NEW/EST HI 80: CPT

## 2022-12-28 PROCEDURE — 72146 MRI CHEST SPINE W/O DYE: CPT | Mod: 26

## 2022-12-28 PROCEDURE — 72141 MRI NECK SPINE W/O DYE: CPT | Mod: 26

## 2022-12-28 PROCEDURE — 72148 MRI LUMBAR SPINE W/O DYE: CPT | Mod: 26

## 2022-12-28 RX ADMIN — HEPARIN SODIUM 5000 UNIT(S): 5000 INJECTION INTRAVENOUS; SUBCUTANEOUS at 05:56

## 2022-12-28 RX ADMIN — INSULIN GLARGINE 17 UNIT(S): 100 INJECTION, SOLUTION SUBCUTANEOUS at 21:56

## 2022-12-28 RX ADMIN — HEPARIN SODIUM 5000 UNIT(S): 5000 INJECTION INTRAVENOUS; SUBCUTANEOUS at 14:29

## 2022-12-28 RX ADMIN — LIDOCAINE AND PRILOCAINE CREAM 1 APPLICATION(S): 25; 25 CREAM TOPICAL at 09:08

## 2022-12-28 RX ADMIN — Medication 650 MILLIGRAM(S): at 10:16

## 2022-12-28 RX ADMIN — Medication 2: at 21:57

## 2022-12-28 RX ADMIN — POLYETHYLENE GLYCOL 3350 17 GRAM(S): 17 POWDER, FOR SOLUTION ORAL at 05:58

## 2022-12-28 RX ADMIN — Medication 6 UNIT(S): at 18:57

## 2022-12-28 RX ADMIN — HEPARIN SODIUM 5000 UNIT(S): 5000 INJECTION INTRAVENOUS; SUBCUTANEOUS at 21:13

## 2022-12-28 RX ADMIN — GABAPENTIN 200 MILLIGRAM(S): 400 CAPSULE ORAL at 18:27

## 2022-12-28 RX ADMIN — FAMOTIDINE 10 MILLIGRAM(S): 10 INJECTION INTRAVENOUS at 14:41

## 2022-12-28 RX ADMIN — Medication 100 MILLIGRAM(S): at 14:31

## 2022-12-28 RX ADMIN — Medication 6 UNIT(S): at 09:07

## 2022-12-28 RX ADMIN — VALSARTAN 40 MILLIGRAM(S): 80 TABLET ORAL at 14:29

## 2022-12-28 RX ADMIN — GABAPENTIN 200 MILLIGRAM(S): 400 CAPSULE ORAL at 05:56

## 2022-12-28 RX ADMIN — CARVEDILOL PHOSPHATE 12.5 MILLIGRAM(S): 80 CAPSULE, EXTENDED RELEASE ORAL at 21:13

## 2022-12-28 RX ADMIN — Medication 6 UNIT(S): at 14:28

## 2022-12-28 RX ADMIN — TAMSULOSIN HYDROCHLORIDE 0.4 MILLIGRAM(S): 0.4 CAPSULE ORAL at 21:13

## 2022-12-28 RX ADMIN — ATORVASTATIN CALCIUM 80 MILLIGRAM(S): 80 TABLET, FILM COATED ORAL at 21:13

## 2022-12-28 RX ADMIN — CHLORHEXIDINE GLUCONATE 1 APPLICATION(S): 213 SOLUTION TOPICAL at 14:34

## 2022-12-28 RX ADMIN — CLOPIDOGREL BISULFATE 75 MILLIGRAM(S): 75 TABLET, FILM COATED ORAL at 14:30

## 2022-12-28 RX ADMIN — Medication 81 MILLIGRAM(S): at 14:30

## 2022-12-28 RX ADMIN — SENNA PLUS 2 TABLET(S): 8.6 TABLET ORAL at 21:13

## 2022-12-28 RX ADMIN — Medication 650 MILLIGRAM(S): at 10:50
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
## 2022-12-28 NOTE — PROGRESS NOTE ADULT - SUBJECTIVE AND OBJECTIVE BOX
afberile  REVIEW OF SYSTEMS:  CONSTITUTIONAL: No fever,  no  weight loss  ENT:  No  tinnitus,   no   vertigo  NECK: No pain or stiffness  RESPIRATORY: No cough, wheezing, chills or hemoptysis;    No Shortness of Breath  CARDIOVASCULAR: No chest pain, palpitations, dizziness  GASTROINTESTINAL: No abdominal or epigastric pain. No nausea, vomiting, or hematemesis; No diarrhea  No melena or hematochezia.  GENITOURINARY: No dysuria, frequency, hematuria, or incontinence  NEUROLOGICAL: No headaches  SKIN: No itching,  no   rash  LYMPH Nodes: No enlarged glands  ENDOCRINE: No heat or cold intolerance  MUSCULOSKELETAL: No joint pain or swelling  PSYCHIATRIC: No depression, anxiety  HEME/LYMPH: No easy bruising, or bleeding gums  ALLERGY AND IMMUNOLOGIC: No hives or eczema	    MEDICATIONS  (STANDING):  allopurinol 100 milliGRAM(s) Oral daily  aspirin  chewable 81 milliGRAM(s) Oral daily  atorvastatin 80 milliGRAM(s) Oral at bedtime  carvedilol 12.5 milliGRAM(s) Oral every 12 hours  chlorhexidine 4% Liquid 1 Application(s) Topical daily  clopidogrel Tablet 75 milliGRAM(s) Oral daily  famotidine    Tablet 10 milliGRAM(s) Oral daily  gabapentin 200 milliGRAM(s) Oral two times a day  heparin   Injectable 5000 Unit(s) SubCutaneous every 8 hours  influenza   Vaccine 0.5 milliLiter(s) IntraMuscular once  insulin glargine Injectable (LANTUS) 17 Unit(s) SubCutaneous at bedtime  insulin lispro (ADMELOG) corrective regimen sliding scale   SubCutaneous Before meals and at bedtime  insulin lispro Injectable (ADMELOG) 6 Unit(s) SubCutaneous three times a day before meals  lidocaine/prilocaine Cream 1 Application(s) Topical <User Schedule>  polyethylene glycol 3350 17 Gram(s) Oral two times a day  senna 2 Tablet(s) Oral at bedtime  tamsulosin 0.4 milliGRAM(s) Oral at bedtime  valsartan 40 milliGRAM(s) Oral daily    MEDICATIONS  (PRN):  acetaminophen     Tablet .. 650 milliGRAM(s) Oral every 6 hours PRN Temp greater or equal to 38C (100.4F), Mild Pain (1 - 3)      Vital Signs Last 24 Hrs  T(C): 37.3 (28 Dec 2022 04:40), Max: 37.5 (28 Dec 2022 00:03)  T(F): 99.1 (28 Dec 2022 04:40), Max: 99.5 (28 Dec 2022 00:03)  HR: 80 (28 Dec 2022 04:40) (76 - 83)  BP: 141/72 (28 Dec 2022 04:40) (141/72 - 160/63)  BP(mean): --  RR: 18 (28 Dec 2022 04:40) (18 - 18)  SpO2: 96% (28 Dec 2022 04:40) (96% - 97%)    Parameters below as of 28 Dec 2022 04:40  Patient On (Oxygen Delivery Method): room air      CAPILLARY BLOOD GLUCOSE      POCT Blood Glucose.: 138 mg/dL (27 Dec 2022 21:43)  POCT Blood Glucose.: 90 mg/dL (27 Dec 2022 17:11)  POCT Blood Glucose.: 78 mg/dL (27 Dec 2022 12:53)  POCT Blood Glucose.: 80 mg/dL (27 Dec 2022 12:51)  POCT Blood Glucose.: 104 mg/dL (27 Dec 2022 09:01)    I&O's Summary    26 Dec 2022 07:01  -  27 Dec 2022 07:00  --------------------------------------------------------  IN: 260 mL / OUT: 0 mL / NET: 260 mL          Appearance: Normal	  HEENT:   Normal oral mucosa, PERRL, EOMI	  Lymphatic: No lymphadenopathy  Cardiovascular: Normal S1 S2, No JVD  Respiratory: Lungs clear to auscultation	  Psychiatry: A & O x 3, Mood & affect appropriate  Gastrointestinal:  Soft, Non-tender, + BS	  Skin: No rash, No ecchymoses	  Extremities: Normal range of motion  Vascular: Peripheral pulses palpable bilaterally    LABS:                        8.6    11.15 )-----------( 436      ( 26 Dec 2022 06:56 )             28.1     12-26    136  |  93<L>  |  87<H>  ----------------------------<  106<H>  5.3   |  22  |  8.58<H>    Ca    9.7      26 Dec 2022 06:56                              Consultant(s) Notes Reviewed:      Care Discussed with Consultants/Other Providers:

## 2022-12-28 NOTE — CONSULT NOTE ADULT - PROVIDER SPECIALTY LIST ADULT
Nephrology
Neurology
Neurosurgery
Rheumatology
Trauma Surgery
Rehab Medicine
Cardiology
Infectious Disease

## 2022-12-28 NOTE — ADVANCED PRACTICE NURSE CONSULT - ASSESSMENT
The pt was encountered on [facility]- having returned from Dialysis earlier this shift. Mr Graciela was in a RESAAS P 500 support surface and resistant to being turned. He did so with encouragement.   Upon assessment, he was incontinent of pasty brown stool and pericare was provided. On the sacrum and b/l buttocks was an evolving deep tissue injury measuring 5cmx 5cm x0cm- 50% of the wound was intact darkly pigmented skin and 50% was open, moist, red tissue. AS the pt is incontinent will recommend the application of Triad paste to lay down a protective coating against the incontinence.  On the left heel was a deep tissue injury measuring 3cmx 4cm x0cm- the skin was intact with deep purple discoloration. The DP pulses are + bilaterally. staff have applied Complete CAir boots to off-load the heels.  The pt was seen by nutrition and found to have mild protein calorie malnutrition.   Pts skin is dry and flaking- will recommend to moisturize daily.

## 2022-12-28 NOTE — ADVANCED PRACTICE NURSE CONSULT - RECOMMEDATIONS
Will recommend the followin. Sacrum, b/l buttocks: continue to monitor, routine pericare with Triad paste- apply twice daily and prn for incontinence  2. Left heel: apply Cavilon daily  3. B/l heels: continue off-loading with boots  4. continue with T&P  5. Seat cushion when OOB to chair  6. Nutrition support as pt condition allows  7. Sween 24 moisturizer to dry skin daily  Tx plan discussed with RN
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
## 2022-12-28 NOTE — PROGRESS NOTE ADULT - SUBJECTIVE AND OBJECTIVE BOX
Chief complaint    Patient is a 62y old  Male who presents with a chief complaint of fall (28 Dec 2022 10:45)   Review of systems  Patient in bed, appears comfortable.    Labs and Fingersticks  CAPILLARY BLOOD GLUCOSE      POCT Blood Glucose.: 137 mg/dL (28 Dec 2022 18:17)  POCT Blood Glucose.: 129 mg/dL (28 Dec 2022 13:52)  POCT Blood Glucose.: 101 mg/dL (28 Dec 2022 08:48)  POCT Blood Glucose.: 138 mg/dL (27 Dec 2022 21:43)      Anion Gap, Serum: 17 (12-28 @ 10:02)      Calcium, Total Serum: 9.2 (12-28 @ 10:02)          12-28    139  |  97  |  73<H>  ----------------------------<  95  4.8   |  25  |  7.13<H>    Ca    9.2      28 Dec 2022 10:02                          7.7    11.27 )-----------( 399      ( 28 Dec 2022 10:02 )             25.8     Medications  MEDICATIONS  (STANDING):  allopurinol 100 milliGRAM(s) Oral daily  aspirin  chewable 81 milliGRAM(s) Oral daily  atorvastatin 80 milliGRAM(s) Oral at bedtime  carvedilol 12.5 milliGRAM(s) Oral every 12 hours  chlorhexidine 4% Liquid 1 Application(s) Topical daily  clopidogrel Tablet 75 milliGRAM(s) Oral daily  famotidine    Tablet 10 milliGRAM(s) Oral daily  gabapentin 200 milliGRAM(s) Oral two times a day  heparin   Injectable 5000 Unit(s) SubCutaneous every 8 hours  influenza   Vaccine 0.5 milliLiter(s) IntraMuscular once  insulin glargine Injectable (LANTUS) 17 Unit(s) SubCutaneous at bedtime  insulin lispro (ADMELOG) corrective regimen sliding scale   SubCutaneous Before meals and at bedtime  insulin lispro Injectable (ADMELOG) 6 Unit(s) SubCutaneous three times a day before meals  lidocaine/prilocaine Cream 1 Application(s) Topical <User Schedule>  polyethylene glycol 3350 17 Gram(s) Oral two times a day  senna 2 Tablet(s) Oral at bedtime  tamsulosin 0.4 milliGRAM(s) Oral at bedtime  valsartan 40 milliGRAM(s) Oral daily      Physical Exam  General: Patient appears comfortable.  Vital Signs Last 12 Hrs  T(F): 97.5 (12-28-22 @ 12:52), Max: 97.9 (12-28-22 @ 09:40)  HR: 84 (12-28-22 @ 12:52) (84 - 85)  BP: 105/54 (12-28-22 @ 12:52) (105/54 - 160/67)  BP(mean): --  RR: 18 (12-28-22 @ 12:52) (17 - 18)  SpO2: 98% (12-28-22 @ 12:52) (98% - 100%)  Neck: No palpable thyroid nodules.  CVS: S1S2, No murmurs  Respiratory: No wheezing, no crepitations  GI: Abdomen soft, non tender.  Musculoskeletal:  edema lower extremities.     Diagnostics

## 2022-12-28 NOTE — CONSULT NOTE ADULT - CONSULT REQUESTED DATE/TIME
12-Dec-2022
14-Dec-2022 14:24
19-Dec-2022 13:22
12-Dec-2022
22-Dec-2022 13:16
28-Dec-2022 08:02
12-Dec-2022 20:00
12-Dec-2022 20:07

## 2022-12-28 NOTE — CONSULT NOTE ADULT - ASSESSMENT
ADAM KOWALSKI is a 62y (1960) man with a PMHx significant for chronic advanced neuropathy,  CAD s/p stent on plavix, DM, HTN, ESRD on dialysis M,W,F (last on 12/9) p/w b/l knee pain s/p fall, found to have subdural hematoma with no acute NSGY intervention. Neurology consulted for reported lethargy and inability to ambulate. Patient denies numbness, weakness, urinary incontinence, back pain. NSG was also consulted. Cord compression series ordered. Primary team also ordered EEG for concern of mental status. Patient poor historian, but not lethargic on exam. Oriented to person and time, knows he is in the hospital, but did not [facility]. Neurological exam limited by patient's pain in all 4 ext. Per chart review, patient has had AMS before.     Impression: Pain limited weakness of unclear etiology s/p fall. R/o toxic metabolic etiology.     Recommendations:   [] check UA, TSH, T3/T4, vitamin B1, B6, B12, folate, homocysteine, methylmalonic acid, lactate, creatnine kinase, ammonia, Cu, SPEP, HIV, ESR, CRP, Zn, Vit D 25OH  [] f/u rEEG findings   [] f/u cord compression series   []PT/OT    Case to be seen with general neurology attending

## 2022-12-28 NOTE — PROGRESS NOTE ADULT - SUBJECTIVE AND OBJECTIVE BOX
Patient seen and examined resting in bed.   no distress  no com plains on dialysis      REVIEW OF SYSTEMS:  As per HPI, otherwise 8 full 10 ROS were unremarkable    MEDICATIONS  (STANDING):  allopurinol 100 milliGRAM(s) Oral daily  amLODIPine   Tablet 5 milliGRAM(s) Oral daily  aspirin  chewable 81 milliGRAM(s) Oral daily  atorvastatin 80 milliGRAM(s) Oral at bedtime  carvedilol 12.5 milliGRAM(s) Oral every 12 hours  chlorhexidine 4% Liquid 1 Application(s) Topical daily  famotidine    Tablet 10 milliGRAM(s) Oral daily  gabapentin 300 milliGRAM(s) Oral two times a day  heparin   Injectable 5000 Unit(s) SubCutaneous every 8 hours  influenza   Vaccine 0.5 milliLiter(s) IntraMuscular once  insulin glargine Injectable (LANTUS) 14 Unit(s) SubCutaneous at bedtime  insulin lispro (ADMELOG) corrective regimen sliding scale   SubCutaneous Before meals and at bedtime  insulin lispro Injectable (ADMELOG) 5 Unit(s) SubCutaneous three times a day before meals  levETIRAcetam 500 milliGRAM(s) Oral at bedtime  lidocaine/prilocaine Cream 1 Application(s) Topical <User Schedule>  polyethylene glycol 3350 17 Gram(s) Oral two times a day  senna 2 Tablet(s) Oral at bedtime  tamsulosin 0.4 milliGRAM(s) Oral at bedtime  valsartan 160 milliGRAM(s) Oral daily      VITAL:  T(C): , Max: 37.4 (12-17-22 @ 11:24)  T(F): , Max: 99.3 (12-17-22 @ 11:24)  HR: 83 (12-17-22 @ 11:24)  BP: 107/64 (12-17-22 @ 11:24)  BP(mean): 78 (12-17-22 @ 11:24)  RR: 18 (12-17-22 @ 11:24)  SpO2: 94% (12-17-22 @ 11:24)  Wt(kg): --    I and O's:    12-16 @ 07:01  -  12-17 @ 07:00  --------------------------------------------------------  IN: 480 mL / OUT: 0 mL / NET: 480 mL          PHYSICAL EXAM:    Constitutional: NAD  HEENT: PERRLA, EOMI,  MMM  Neck: No LAD, No JVD  Respiratory: CTAB  Cardiovascular: S1 and S2  Gastrointestinal: BS+, soft, NT/ND  Extremities: No peripheral edema  Neurological: A/O x 3, no focal deficits  Psychiatric: Normal mood, normal affect  : No Jay  Skin: No rashes  Access: +AVF +thrill    LABS:    12-17    137  |  98  |  63<H>  ----------------------------<  110<H>  3.9   |  23  |  5.98<H>    Ca    8.2<L>      17 Dec 2022 07:39  Mg     2.2     12-17        Assessment and Plan:   · Assessment	  62 year-old man with history of multiple medical issues including hypertension, diabetes mellitus, coronary artery diease, congestive heart failure with reduced EF, gout, and ESRD presents from PCP office for fall and is on a/c and has subdural hematoma .  ESRD ---TTS     1 Nuerosurg- Off A/C at present;  Started on Keppra   2 Renal-Plan for HD today   ,UF 1 liter .  Lytes acceptable  3 CVS-  no pleural effusion , amlodipine held , BP soft stable           [facility]  Office: [phone]

## 2022-12-28 NOTE — PROGRESS NOTE ADULT - ASSESSMENT
Assessment  DMT2: 62y Male with DM T2 with hyperglycemia admitted with on oral hypoglycemic agents at home, now on basal bolus insulin, blood sugars are improving, no  hypoglycemic episodes, eating meals, no acute events.  SDH: Patient is stable, monitored.  HTN: On antihypertensive medications, monitored, asymptomatic.  CKD: On hemodialysis, labs, chart reviewed.              Rubén Escoto MD  Cell:  [phone]  Office: [phone]

## 2022-12-29 LAB
24R-OH-CALCIDIOL SERPL-MCNC: 36.6 NG/ML — SIGNIFICANT CHANGE UP (ref 30–80)
AMMONIA BLD-MCNC: 16 UMOL/L — SIGNIFICANT CHANGE UP (ref 11–55)
CK SERPL-CCNC: 71 U/L — SIGNIFICANT CHANGE UP (ref 30–200)
CRP SERPL-MCNC: 186 MG/L — HIGH (ref 0–4)
ERYTHROCYTE [SEDIMENTATION RATE] IN BLOOD: 72 MM/HR — HIGH (ref 0–20)
FOLATE SERPL-MCNC: 5.8 NG/ML — SIGNIFICANT CHANGE UP
GLUCOSE BLDC GLUCOMTR-MCNC: 100 MG/DL — HIGH (ref 70–99)
GLUCOSE BLDC GLUCOMTR-MCNC: 131 MG/DL — HIGH (ref 70–99)
GLUCOSE BLDC GLUCOMTR-MCNC: 144 MG/DL — HIGH (ref 70–99)
GLUCOSE BLDC GLUCOMTR-MCNC: 83 MG/DL — SIGNIFICANT CHANGE UP (ref 70–99)
GLUCOSE BLDC GLUCOMTR-MCNC: 88 MG/DL — SIGNIFICANT CHANGE UP (ref 70–99)
HCT VFR BLD CALC: 25.2 % — LOW (ref 39–50)
HCYS SERPL-MCNC: 18.6 UMOL/L — HIGH
HGB BLD-MCNC: 7.8 G/DL — LOW (ref 13–17)
HIV 1+2 AB+HIV1 P24 AG SERPL QL IA: SIGNIFICANT CHANGE UP
LACTATE SERPL-SCNC: 0.9 MMOL/L — SIGNIFICANT CHANGE UP (ref 0.5–2)
MCHC RBC-ENTMCNC: 26.7 PG — LOW (ref 27–34)
MCHC RBC-ENTMCNC: 31 GM/DL — LOW (ref 32–36)
MCV RBC AUTO: 86.3 FL — SIGNIFICANT CHANGE UP (ref 80–100)
NRBC # BLD: 0 /100 WBCS — SIGNIFICANT CHANGE UP (ref 0–0)
PLATELET # BLD AUTO: 407 K/UL — HIGH (ref 150–400)
PROT SERPL-MCNC: 6.4 G/DL — SIGNIFICANT CHANGE UP (ref 6–8.3)
PROT SERPL-MCNC: 6.4 G/DL — SIGNIFICANT CHANGE UP (ref 6–8.3)
RBC # BLD: 2.92 M/UL — LOW (ref 4.2–5.8)
RBC # FLD: 15.8 % — HIGH (ref 10.3–14.5)
T3 SERPL-MCNC: 65 NG/DL — LOW (ref 80–200)
T4 AB SER-ACNC: 6.5 UG/DL — SIGNIFICANT CHANGE UP (ref 4.6–12)
TSH SERPL-MCNC: 2.03 UIU/ML — SIGNIFICANT CHANGE UP (ref 0.27–4.2)
VIT B12 SERPL-MCNC: 482 PG/ML — SIGNIFICANT CHANGE UP (ref 232–1245)
WBC # BLD: 10.64 K/UL — HIGH (ref 3.8–10.5)
WBC # FLD AUTO: 10.64 K/UL — HIGH (ref 3.8–10.5)

## 2022-12-29 PROCEDURE — 93010 ELECTROCARDIOGRAM REPORT: CPT

## 2022-12-29 PROCEDURE — 99233 SBSQ HOSP IP/OBS HIGH 50: CPT

## 2022-12-29 PROCEDURE — 95812 EEG 41-60 MINUTES: CPT | Mod: 26

## 2022-12-29 PROCEDURE — 90792 PSYCH DIAG EVAL W/MED SRVCS: CPT

## 2022-12-29 RX ADMIN — GABAPENTIN 200 MILLIGRAM(S): 400 CAPSULE ORAL at 18:18

## 2022-12-29 RX ADMIN — CARVEDILOL PHOSPHATE 12.5 MILLIGRAM(S): 80 CAPSULE, EXTENDED RELEASE ORAL at 09:29

## 2022-12-29 RX ADMIN — Medication 6 UNIT(S): at 18:18

## 2022-12-29 RX ADMIN — FAMOTIDINE 10 MILLIGRAM(S): 10 INJECTION INTRAVENOUS at 12:49

## 2022-12-29 RX ADMIN — Medication 650 MILLIGRAM(S): at 14:02

## 2022-12-29 RX ADMIN — VALSARTAN 40 MILLIGRAM(S): 80 TABLET ORAL at 09:29

## 2022-12-29 RX ADMIN — Medication 100 MILLIGRAM(S): at 12:49

## 2022-12-29 RX ADMIN — POLYETHYLENE GLYCOL 3350 17 GRAM(S): 17 POWDER, FOR SOLUTION ORAL at 06:13

## 2022-12-29 RX ADMIN — Medication 81 MILLIGRAM(S): at 12:49

## 2022-12-29 RX ADMIN — HEPARIN SODIUM 5000 UNIT(S): 5000 INJECTION INTRAVENOUS; SUBCUTANEOUS at 13:46

## 2022-12-29 RX ADMIN — Medication 650 MILLIGRAM(S): at 12:50

## 2022-12-29 RX ADMIN — GABAPENTIN 200 MILLIGRAM(S): 400 CAPSULE ORAL at 06:13

## 2022-12-29 RX ADMIN — Medication 6 UNIT(S): at 09:28

## 2022-12-29 RX ADMIN — Medication 650 MILLIGRAM(S): at 06:12

## 2022-12-29 RX ADMIN — Medication 650 MILLIGRAM(S): at 18:50

## 2022-12-29 RX ADMIN — HEPARIN SODIUM 5000 UNIT(S): 5000 INJECTION INTRAVENOUS; SUBCUTANEOUS at 06:16

## 2022-12-29 RX ADMIN — CLOPIDOGREL BISULFATE 75 MILLIGRAM(S): 75 TABLET, FILM COATED ORAL at 12:49

## 2022-12-29 RX ADMIN — Medication 650 MILLIGRAM(S): at 06:44

## 2022-12-29 NOTE — PROGRESS NOTE ADULT - SUBJECTIVE AND OBJECTIVE BOX
Chief complaint    Patient is a 62y old  Male who presents with a chief complaint of fall (29 Dec 2022 10:47)   Review of systems  Patient in bed, appears comfortable.    Labs and Fingersticks  CAPILLARY BLOOD GLUCOSE      POCT Blood Glucose.: 100 mg/dL (29 Dec 2022 12:27)  POCT Blood Glucose.: 131 mg/dL (29 Dec 2022 08:57)  POCT Blood Glucose.: 173 mg/dL (28 Dec 2022 21:50)  POCT Blood Glucose.: 137 mg/dL (28 Dec 2022 18:17)  POCT Blood Glucose.: 129 mg/dL (28 Dec 2022 13:52)      Anion Gap, Serum: 17 (12-28 @ 10:02)      Calcium, Total Serum: 9.2 (12-28 @ 10:02)  Vitamin D, 25-Hydroxy: 36.6 (12-29 @ 06:43)          12-28    139  |  97  |  73<H>  ----------------------------<  95  4.8   |  25  |  7.13<H>    Ca    9.2      28 Dec 2022 10:02    TPro  6.4  /  Alb  x   /  TBili  x   /  DBili  x   /  AST  x   /  ALT  x   /  AlkPhos  x   12-29                        7.8    10.64 )-----------( 407      ( 29 Dec 2022 06:40 )             25.2     Medications  MEDICATIONS  (STANDING):  allopurinol 100 milliGRAM(s) Oral daily  aspirin  chewable 81 milliGRAM(s) Oral daily  atorvastatin 80 milliGRAM(s) Oral at bedtime  carvedilol 12.5 milliGRAM(s) Oral every 12 hours  chlorhexidine 4% Liquid 1 Application(s) Topical daily  clopidogrel Tablet 75 milliGRAM(s) Oral daily  famotidine    Tablet 10 milliGRAM(s) Oral daily  gabapentin 200 milliGRAM(s) Oral two times a day  heparin   Injectable 5000 Unit(s) SubCutaneous every 8 hours  influenza   Vaccine 0.5 milliLiter(s) IntraMuscular once  insulin glargine Injectable (LANTUS) 17 Unit(s) SubCutaneous at bedtime  insulin lispro (ADMELOG) corrective regimen sliding scale   SubCutaneous Before meals and at bedtime  insulin lispro Injectable (ADMELOG) 6 Unit(s) SubCutaneous three times a day before meals  lidocaine/prilocaine Cream 1 Application(s) Topical <User Schedule>  polyethylene glycol 3350 17 Gram(s) Oral two times a day  senna 2 Tablet(s) Oral at bedtime  tamsulosin 0.4 milliGRAM(s) Oral at bedtime  valsartan 40 milliGRAM(s) Oral daily      Physical Exam  General: Patient appears comfortable.  Vital Signs Last 12 Hrs  T(F): 99.7 (12-29-22 @ 12:29), Max: 99.7 (12-29-22 @ 12:29)  HR: 87 (12-29-22 @ 12:29) (85 - 89)  BP: 139/76 (12-29-22 @ 12:29) (127/78 - 158/84)  BP(mean): --  RR: 17 (12-29-22 @ 12:29) (17 - 18)  SpO2: 97% (12-29-22 @ 12:29) (96% - 97%)  Neck: No palpable thyroid nodules.  CVS: S1S2, No murmurs  Respiratory: No wheezing, no crepitations  GI: Abdomen soft, non tender.  Musculoskeletal:  edema lower extremities.     Diagnostics

## 2022-12-29 NOTE — PROGRESS NOTE ADULT - ASSESSMENT
Renal Attending   Physical Exam:  Lungs:  b/l clear  Heart: Normal S1,S2  Abdomen: soft ,non tender  Extremities : no edema  Patient seen and examined. chart and NP note reviewed .I agreed with plan ans assessment and plan of care as written.   Renal Attending   Physical Exam:  Lungs:  b/l clear  Heart: Normal S1,S2  Abdomen: soft ,non tender  Extremities : no edema  Patient seen and examined. chart and NP note reviewed .I agreed with plan ans assessment and plan of care as written.          [facility]  Office: [phone]

## 2022-12-29 NOTE — BH CONSULTATION LIAISON ASSESSMENT NOTE - CURRENT MEDICATION
MEDICATIONS  (STANDING):  allopurinol 100 milliGRAM(s) Oral daily  aspirin  chewable 81 milliGRAM(s) Oral daily  atorvastatin 80 milliGRAM(s) Oral at bedtime  carvedilol 12.5 milliGRAM(s) Oral every 12 hours  chlorhexidine 4% Liquid 1 Application(s) Topical daily  clopidogrel Tablet 75 milliGRAM(s) Oral daily  famotidine    Tablet 10 milliGRAM(s) Oral daily  gabapentin 200 milliGRAM(s) Oral two times a day  heparin   Injectable 5000 Unit(s) SubCutaneous every 8 hours  influenza   Vaccine 0.5 milliLiter(s) IntraMuscular once  insulin glargine Injectable (LANTUS) 17 Unit(s) SubCutaneous at bedtime  insulin lispro (ADMELOG) corrective regimen sliding scale   SubCutaneous Before meals and at bedtime  insulin lispro Injectable (ADMELOG) 6 Unit(s) SubCutaneous three times a day before meals  lidocaine/prilocaine Cream 1 Application(s) Topical <User Schedule>  polyethylene glycol 3350 17 Gram(s) Oral two times a day  senna 2 Tablet(s) Oral at bedtime  tamsulosin 0.4 milliGRAM(s) Oral at bedtime  valsartan 40 milliGRAM(s) Oral daily    MEDICATIONS  (PRN):  acetaminophen     Tablet .. 650 milliGRAM(s) Oral every 6 hours PRN Temp greater or equal to 38C (100.4F), Mild Pain (1 - 3)

## 2022-12-29 NOTE — BH CONSULTATION LIAISON ASSESSMENT NOTE - DESCRIPTION
Patient is domiciled with his wife. Lives in [place]. Unable to provide further details. Denies substance and tobacco use. Patient is domiciled with his wife. Lives in [place]. Unable to provide further details. Denies substance and tobacco use.  Has adult children.

## 2022-12-29 NOTE — PROGRESS NOTE ADULT - ASSESSMENT
Assessment  DMT2: 62y Male with DM T2 with hyperglycemia admitted with on oral hypoglycemic agents at home, now on basal bolus insulin, blood sugars trending with erick acceptable range, no  hypoglycemic episodes, eating meals, no acute events.  SDH: Patient is stable, monitored.  HTN: On antihypertensive medications, monitored, asymptomatic.  CKD: On hemodialysis, labs, chart reviewed.              Rubén Escoto MD  Cell:  [phone]  Office: [phone]

## 2022-12-29 NOTE — PROGRESS NOTE ADULT - ASSESSMENT
62y (1960) man with a PMHx significant for chronic advanced neuropathy, CAD s/p stent on DAPT 11/2022, DM, HTN, ESRD on dialysis M,W,F (last on 12/9) p/w b/l knee pain s/p fall 12/10, found to have subdural hematoma with no acute NSGY intervention. Neurology consulted for reported lethargy and inability to ambulate. Per wife, pt has been ambulating with a cane for several years due to diabetic neuropathy (uses a walker occasionally when he has gout flares). In 10/2022, pt admitted to [facility] for DKA, was started on HD, and since then has been in and out of hospitals. Pt's wife reports that his mental status has been waxing and waning, with increased lethargy and confusion at times. Pt discharged from rehab on 12/5 and was ambulating a bit with assistive devices but on 12/10 had a fall on the stairs, for which he presented to the hospital.    Impression:      Recommendations:   [] check UA, TSH, T3/T4, vitamin B1, B6, B12, folate, homocysteine, methylmalonic acid, lactate, creatnine kinase, ammonia, Cu, SPEP, HIV, ESR, CRP, Zn, Vit D 25OH  [] f/u rEEG   [x] acute cord compression series neg  [] per PT, pt was able to ambulate with a walker earlier this admission (despite recent fall at home) and now is unable to ambulate. Given reported acute change, recommend LP. However pt on aspirin and plavix for recent cardiac stent 11/2022, and plavix would need to be held for 5-7 days prior to LP. Recommend discussion for cardiology for risk vs. benefit.  [] outpatient EMG/NCS   [] Patient can follow up with Dr. Sidney Baca after discharge. Please instruct the patient to call [phone] to schedule an appointment within the next 1-2 weeks. Office is located at [address] [place] [address], [place] [address].   62y (1960) man with a PMHx significant for chronic advanced neuropathy, CAD s/p stent on DAPT 11/2022, DM, HTN, ESRD on dialysis M,W,F (last on 12/9) p/w b/l knee pain s/p fall 12/10, found to have subdural hematoma with no acute NSGY intervention. Neurology consulted for reported lethargy and inability to ambulate. Per wife, pt has been ambulating with a cane for several years due to diabetic neuropathy (uses a walker occasionally when he has gout flares). In 10/2022, pt admitted to [facility] for DKA, was started on HD, and since then has been in and out of hospitals. Pt's wife reports that his mental status has been waxing and waning, with increased lethargy and confusion at times. Pt discharged from rehab on 12/5 and was ambulating a bit with assistive devices but on 12/10 had a fall on the stairs, for which he presented to the hospital.    Impression: ?acute on chronic worsening of generalized weakness (R>L), fluctuating MS likely multifactorial in setting of TME (new HD, anemia), cardiac arrhythmias, chronic severe peripheral neuropathy (likely 2/2 DM). LP to r/o AIDP, and other infectious/inflammatory/neoplastic etiologies given acute worsening. No cord compression on MRI    Recommendations:   [] check UA, TSH, T3/T4, vitamin B1, B6, B12, folate, homocysteine, methylmalonic acid, lactate, CK 71, ammonia 16, Cu, SPEP, HIV neg, ESR 72, , Zn, Vit D 25OH  [] f/u rEEG   [x] acute cord compression series neg  [] per PT, pt was able to ambulate with a walker earlier this admission (despite recent fall at home) and now is unable to ambulate. Given reported acute change, recommend LP. However pt on aspirin and plavix for recent cardiac stent 11/2022, and plavix would need to be held for 5-7 days prior to LP. Recommend discussion for cardiology for risk vs. benefit.  [] outpatient EMG/NCS   [] evaluation for gout and pain management per primary team   [] Patient can follow up with Dr. Sidney Baca after discharge. Please instruct the patient to call [phone] to schedule an appointment within the next 1-2 weeks. Office is located at [address] [place] [address].    Case seen and discussed with Dr. Ortiz
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
## 2022-12-29 NOTE — BH CONSULTATION LIAISON ASSESSMENT NOTE - NSBHCHARTREVIEWLAB_PSY_A_CORE FT
7.8    10.64 )-----------( 407      ( 29 Dec 2022 06:40 )             25.2     12-28    139  |  97  |  73<H>  ----------------------------<  95  4.8   |  25  |  7.13<H>    Ca    9.2      28 Dec 2022 10:02    TPro  6.4  /  Alb  x   /  TBili  x   /  DBili  x   /  AST  x   /  ALT  x   /  AlkPhos  x   12-29

## 2022-12-29 NOTE — PROGRESS NOTE ADULT - SUBJECTIVE AND OBJECTIVE BOX
SUBJECTIVE/INTERVAL HISTORY: Pt seen and examined at bedside with neurology attending and team. Per PT, pt was able to ambulate with a walker earlier this admission and is now unable to ambulate. Pt more awake and alert. Pt initially saying generalized pain but when asked specifically does endorse more pain in RUE than LUE    PAST MEDICAL & SURGICAL HISTORY:  Diabetes Mellitus Type II, Uncontrolled      Dyslipidemia      s/p Angioplasty with Stent      HTN (hypertension)      Gout      Diabetic retinopathy      GERD (gastroesophageal reflux disease)      Nephrolithiasis      Vitamin D deficiency      Hepatitis      CKD (chronic kidney disease)      Ischemic cardiomyopathy      ASHD (arteriosclerotic heart disease)      Pulmonary hypertension      Myocardial infarction      CAD (coronary artery disease)      BPH (benign prostatic hyperplasia)      Retinal Hemorrhage      S/P coronary artery stent placement  2010 TRICIA to Diag ; 11/18/2010  LAD; 2/4/11 ATOM liberte Diag; 5/15/2017  TRICIA to 1st diag; 6/7/17 PCI with laser and high pressure balloon      History of eye surgery  left eye      H/O lithotripsy      Diabetes with retinopathy  S/P PPV/PRP/GAS  OD 2/22/17 for viterous hemorrhage      CHF with cardiomyopathy  Insertion of Cardiomems monitor      Stented coronary artery        FAMILY HISTORY:  Family history of cardiac disorder in father (Father)        MEDICATIONS (HOME):  Home Medications:  allopurinol 100 mg oral tablet: 1 tab(s) orally once a day (at bedtime) (13 Dec 2022 12:55)  amLODIPine 5 mg oral tablet: 1 tab(s) orally once a day (13 Dec 2022 12:55)  aspirin 81 mg oral delayed release tablet: 1 tab(s) orally once a day (13 Dec 2022 12:55)  atorvastatin 80 mg oral tablet: 1 tab(s) orally once a day (at bedtime) (13 Dec 2022 12:55)  bisacodyl 10 mg rectal suppository: 1 suppository(ies) rectal once a day, As Needed (13 Dec 2022 12:55)  clopidogrel 75 mg oral tablet: 1 tab(s) orally once a day (13 Dec 2022 10:22)  insulin aspart 100 units/mL subcutaneous solution: 8 unit(s) subcutaneous 3 times a day (13 Dec 2022 12:55)  Lantus 100 units/mL subcutaneous solution: 10 unit(s) subcutaneous once a day (at bedtime) (13 Dec 2022 12:55)  tamsulosin 0.4 mg oral capsule: 1 cap(s) orally once a day (at bedtime) (13 Dec 2022 12:55)  valsartan 320 mg oral tablet: 1 tab(s) orally once a day (13 Dec 2022 12:55)    MEDICATIONS  (STANDING):  allopurinol 100 milliGRAM(s) Oral daily  aspirin  chewable 81 milliGRAM(s) Oral daily  atorvastatin 80 milliGRAM(s) Oral at bedtime  carvedilol 12.5 milliGRAM(s) Oral every 12 hours  chlorhexidine 4% Liquid 1 Application(s) Topical daily  clopidogrel Tablet 75 milliGRAM(s) Oral daily  famotidine    Tablet 10 milliGRAM(s) Oral daily  gabapentin 200 milliGRAM(s) Oral two times a day  heparin   Injectable 5000 Unit(s) SubCutaneous every 8 hours  influenza   Vaccine 0.5 milliLiter(s) IntraMuscular once  insulin glargine Injectable (LANTUS) 17 Unit(s) SubCutaneous at bedtime  insulin lispro (ADMELOG) corrective regimen sliding scale   SubCutaneous Before meals and at bedtime  insulin lispro Injectable (ADMELOG) 6 Unit(s) SubCutaneous three times a day before meals  lidocaine/prilocaine Cream 1 Application(s) Topical <User Schedule>  polyethylene glycol 3350 17 Gram(s) Oral two times a day  senna 2 Tablet(s) Oral at bedtime  tamsulosin 0.4 milliGRAM(s) Oral at bedtime  valsartan 40 milliGRAM(s) Oral daily    MEDICATIONS  (PRN):  acetaminophen     Tablet .. 650 milliGRAM(s) Oral every 6 hours PRN Temp greater or equal to 38C (100.4F), Mild Pain (1 - 3)    ALLERGIES/INTOLERANCES:  Allergies  No Known Drug Allergies  Seafood (Unknown)  shellfish (Unknown)    Intolerances    VITALS & EXAMINATION:  Vital Signs Last 24 Hrs  T(C): 37.5 (29 Dec 2022 04:29), Max: 37.5 (29 Dec 2022 04:29)  T(F): 99.5 (29 Dec 2022 04:29), Max: 99.5 (29 Dec 2022 04:29)  HR: 89 (29 Dec 2022 09:37) (82 - 93)  BP: 127/78 (29 Dec 2022 09:37) (105/54 - 158/84)  BP(mean): --  RR: 17 (29 Dec 2022 09:37) (17 - 18)  SpO2: 97% (29 Dec 2022 09:37) (94% - 98%)    Parameters below as of 29 Dec 2022 09:37  Patient On (Oxygen Delivery Method): room air      General:  Constitutional: Obese Male, appears stated age, in no apparent distress including pain  Head: Normocephalic & atraumatic.   Respiratory: No increased work of breathing  Extremities: No cyanosis, clubbing, or edema.  Skin: No rashes, bruising, or discoloration.    Neurological (>12):  MS: Awake, alert, not oriented. Normal affect. Follows most simple commands. Unable to provide hx. Able to answer yes/no questions.  Language: Speech is clear, fluent.   CNs: PERRLA (R = 3mm, L = 3mm). VFF. EOMI no nystagmus, no diplopia. V1-3 intact to LT/pinprick, well developed masseter muscles b/l. No facial asymmetry b/l, full eye closure strength b/l. Hearing grossly normal (rubbing fingers) b/l. Symmetric palate elevation in midline. Gag reflex deferred. Head turning & shoulder shrug intact b/l. Tongue midline, normal movements, no atrophy.  Motor: RUE is flexed and contracted, seems to have radial palsy. BLE weak, but RLE is externally rotated, distal atrophy. Pain on passive ROM in shoulder and R wrist movement  Sensation: Intact to noxious stimuli b/l  Reflexes: b/l toes mute, DTRs diminished  Coordination: unable to assess due to MS  Gait: unable to assess due to MS    LABORATORY:  CBC                       7.8    10.64 )-----------( 407      ( 29 Dec 2022 06:40 )             25.2     Chem 12-28    139  |  97  |  73<H>  ----------------------------<  95  4.8   |  25  |  7.13<H>    Ca    9.2      28 Dec 2022 10:02    Cardiac enzymes CARDIAC MARKERS ( 29 Dec 2022 06:45 )  x     / x     / 71 U/L / x     / x        STUDIES & IMAGING:    < from: MR Acute Spinal Cord Compression (12.28.22 @ 16:36) >  INTERPRETATION:  CLINICAL INDICATION: Trauma, worsening weakness, unable   to walk    Magnetic resonance imaging of the cervical, thoracic,and lumbosacral   spine was carried out with sagittal surface coil imaging from  C 1 to   S3-4 using the cord compression protocol of sagittal T1 and STIR images   with axial T1 and T2-weighted series through the lumbar spine on a 1.5   Mercedes magnet.    The cervical, thoracic, and lumbosacral vertebral bodies are normal in   height and signal intensity. There is a small osteophyte at the inferior   endplate of L4 with bright signal intensity on the T1-weighted images   suggesting fatty degenerative change. There are no disc herniations or   spinal stenosis. There are no acute fractures or dislocations. There is   no marrow edema. The cervical and thoracic cord size, contour, and signal   characteristics. The conus terminates normally at the T12-L1 level.    Mild degenerative disc disease at L5-S1 is noted.    Paraspinal soft tissues are unremarkable.      Impression: No cord or cauda equina compression    < end of copied text >   SUBJECTIVE/INTERVAL HISTORY: Pt seen and examined at bedside with neurology attending and team. Per PT, pt was able to ambulate with a walker earlier this admission and is now unable to ambulate. Pt more awake and alert. Pt initially saying generalized pain but when asked specifically does endorse more pain in RUE than LUE    PAST MEDICAL & SURGICAL HISTORY:  Diabetes Mellitus Type II, Uncontrolled    Dyslipidemia    s/p Angioplasty with Stent    HTN (hypertension)    Gout    Diabetic retinopathy    GERD (gastroesophageal reflux disease)    Nephrolithiasis    Vitamin D deficiency    Hepatitis    CKD (chronic kidney disease)    Ischemic cardiomyopathy    ASHD (arteriosclerotic heart disease)    Pulmonary hypertension    Myocardial infarction    CAD (coronary artery disease)    BPH (benign prostatic hyperplasia)    Retinal Hemorrhage    S/P coronary artery stent placement  2010 TRICIA to Diag ; 11/18/2010  LAD; 2/4/11 ATOM liberte Diag; 5/15/2017  TRICIA to 1st diag; 6/7/17 PCI with laser and high pressure balloon    History of eye surgery  left eye    H/O lithotripsy    Diabetes with retinopathy  S/P PPV/PRP/GAS  OD 2/22/17 for viterous hemorrhage    CHF with cardiomyopathy  Insertion of Cardiomems monitor    Stented coronary artery      FAMILY HISTORY:  Family history of cardiac disorder in father (Father)        MEDICATIONS (HOME):  Home Medications:  allopurinol 100 mg oral tablet: 1 tab(s) orally once a day (at bedtime) (13 Dec 2022 12:55)  amLODIPine 5 mg oral tablet: 1 tab(s) orally once a day (13 Dec 2022 12:55)  aspirin 81 mg oral delayed release tablet: 1 tab(s) orally once a day (13 Dec 2022 12:55)  atorvastatin 80 mg oral tablet: 1 tab(s) orally once a day (at bedtime) (13 Dec 2022 12:55)  bisacodyl 10 mg rectal suppository: 1 suppository(ies) rectal once a day, As Needed (13 Dec 2022 12:55)  clopidogrel 75 mg oral tablet: 1 tab(s) orally once a day (13 Dec 2022 10:22)  insulin aspart 100 units/mL subcutaneous solution: 8 unit(s) subcutaneous 3 times a day (13 Dec 2022 12:55)  Lantus 100 units/mL subcutaneous solution: 10 unit(s) subcutaneous once a day (at bedtime) (13 Dec 2022 12:55)  tamsulosin 0.4 mg oral capsule: 1 cap(s) orally once a day (at bedtime) (13 Dec 2022 12:55)  valsartan 320 mg oral tablet: 1 tab(s) orally once a day (13 Dec 2022 12:55)    MEDICATIONS  (STANDING):  allopurinol 100 milliGRAM(s) Oral daily  aspirin  chewable 81 milliGRAM(s) Oral daily  atorvastatin 80 milliGRAM(s) Oral at bedtime  carvedilol 12.5 milliGRAM(s) Oral every 12 hours  chlorhexidine 4% Liquid 1 Application(s) Topical daily  clopidogrel Tablet 75 milliGRAM(s) Oral daily  famotidine    Tablet 10 milliGRAM(s) Oral daily  gabapentin 200 milliGRAM(s) Oral two times a day  heparin   Injectable 5000 Unit(s) SubCutaneous every 8 hours  influenza   Vaccine 0.5 milliLiter(s) IntraMuscular once  insulin glargine Injectable (LANTUS) 17 Unit(s) SubCutaneous at bedtime  insulin lispro (ADMELOG) corrective regimen sliding scale   SubCutaneous Before meals and at bedtime  insulin lispro Injectable (ADMELOG) 6 Unit(s) SubCutaneous three times a day before meals  lidocaine/prilocaine Cream 1 Application(s) Topical <User Schedule>  polyethylene glycol 3350 17 Gram(s) Oral two times a day  senna 2 Tablet(s) Oral at bedtime  tamsulosin 0.4 milliGRAM(s) Oral at bedtime  valsartan 40 milliGRAM(s) Oral daily    MEDICATIONS  (PRN):  acetaminophen     Tablet .. 650 milliGRAM(s) Oral every 6 hours PRN Temp greater or equal to 38C (100.4F), Mild Pain (1 - 3)    ALLERGIES/INTOLERANCES:  Allergies  No Known Drug Allergies  Seafood (Unknown)  shellfish (Unknown)    Intolerances    VITALS & EXAMINATION:  Vital Signs Last 24 Hrs  T(C): 37.5 (29 Dec 2022 04:29), Max: 37.5 (29 Dec 2022 04:29)  T(F): 99.5 (29 Dec 2022 04:29), Max: 99.5 (29 Dec 2022 04:29)  HR: 89 (29 Dec 2022 09:37) (82 - 93)  BP: 127/78 (29 Dec 2022 09:37) (105/54 - 158/84)  BP(mean): --  RR: 17 (29 Dec 2022 09:37) (17 - 18)  SpO2: 97% (29 Dec 2022 09:37) (94% - 98%)    Parameters below as of 29 Dec 2022 09:37  Patient On (Oxygen Delivery Method): room air      General:  Constitutional: Obese Male, appears stated age, in no apparent distress including pain  Head: Normocephalic & atraumatic.   Respiratory: No increased work of breathing  Extremities: No cyanosis, clubbing, or edema.  Skin: No rashes, bruising, or discoloration.    Neurological (>12):  MS: Awake, alert, not oriented. Normal affect. Follows most simple commands. Unable to provide hx. Able to answer yes/no questions.  Language: Speech is clear, fluent.   CNs: PERRLA (R = 3mm, L = 3mm). VFF. EOMI no nystagmus, no diplopia. V1-3 intact to LT/pinprick, well developed masseter muscles b/l. No facial asymmetry b/l, full eye closure strength b/l. Hearing grossly normal (rubbing fingers) b/l. Symmetric palate elevation in midline. Gag reflex deferred. Head turning & shoulder shrug intact b/l. Tongue midline, normal movements, no atrophy.  Motor: RUE is flexed and contracted, seems to have radial palsy. BLE weak, but RLE is externally rotated, distal atrophy. Pain on passive ROM in shoulder and R wrist movement  Sensation: Intact to noxious stimuli b/l  Reflexes: b/l toes mute, DTRs diminished  Coordination: unable to assess due to MS  Gait: unable to assess due to MS    LABORATORY:  CBC                       7.8    10.64 )-----------( 407      ( 29 Dec 2022 06:40 )             25.2     Chem 12-28    139  |  97  |  73<H>  ----------------------------<  95  4.8   |  25  |  7.13<H>    Ca    9.2      28 Dec 2022 10:02    Cardiac enzymes CARDIAC MARKERS ( 29 Dec 2022 06:45 )  x     / x     / 71 U/L / x     / x        STUDIES & IMAGING:    < from: MR Acute Spinal Cord Compression (12.28.22 @ 16:36) >  INTERPRETATION:  CLINICAL INDICATION: Trauma, worsening weakness, unable   to walk    Magnetic resonance imaging of the cervical, thoracic,and lumbosacral   spine was carried out with sagittal surface coil imaging from  C 1 to   S3-4 using the cord compression protocol of sagittal T1 and STIR images   with axial T1 and T2-weighted series through the lumbar spine on a 1.5   Mercedes magnet.    The cervical, thoracic, and lumbosacral vertebral bodies are normal in   height and signal intensity. There is a small osteophyte at the inferior   endplate of L4 with bright signal intensity on the T1-weighted images   suggesting fatty degenerative change. There are no disc herniations or   spinal stenosis. There are no acute fractures or dislocations. There is   no marrow edema. The cervical and thoracic cord size, contour, and signal   characteristics. The conus terminates normally at the T12-L1 level.    Mild degenerative disc disease at L5-S1 is noted.    Paraspinal soft tissues are unremarkable.      Impression: No cord or cauda equina compression    < end of copied text >

## 2022-12-29 NOTE — BH CONSULTATION LIAISON ASSESSMENT NOTE - RISK ASSESSMENT
Patient is a low risk of harm to self or others. Risk factors: acute mental status changes, depressed mood, medical co-morbidities Protective factors: residential stability, relationship stability, therapeutic relationship with providers
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
## 2022-12-29 NOTE — CHART NOTE - NSCHARTNOTESELECT_GEN_ALL_CORE
Event Note
Nutrition Services
transfer of service/Event Note
Event Note
Neurology
Neurosurgery/Event Note
Rheumatology/Event Note
Urinary retention/Event Note
VTE Risk Assessment/Event Note

## 2022-12-29 NOTE — BH CONSULTATION LIAISON ASSESSMENT NOTE - NSBHCHARTREVIEWINVESTIGATE_PSY_A_CORE FT
< from: 12 Lead ECG (12.13.22 @ 15:54) >      Ventricular Rate 110 BPM    Atrial Rate 110 BPM    P-R Interval 148 ms    QRS Duration 64 ms    Q-T Interval 326 ms    QTC Calculation(Bazett) 441 ms    P Axis 44 degrees    R Axis 46 degrees    T Axis 136 degrees    Diagnosis Line SINUS TACHYCARDIA WITH OCCASIONAL PREMATURE VENTRICULAR COMPLEXES  POSSIBLE LEFT ATRIAL ENLARGEMENT  ANTERIOR INFARCT , AGE UNDETERMINED  ABNORMAL ECG  WHEN COMPARED WITH ECG OF 13-DEC-2022 15:54, (UNCONFIRMED)  SIGNIFICANT CHANGES HAVE OCCURRED  Confirmed by PIPER WOLF BANI ([id number]) on 12/16/2022 2:22:19 PM    < end of copied text >

## 2022-12-29 NOTE — BH CONSULTATION LIAISON ASSESSMENT NOTE - NSBHCHARTREVIEWVS_PSY_A_CORE FT
Vital Signs Last 24 Hrs  T(C): 37.6 (29 Dec 2022 12:29), Max: 37.6 (29 Dec 2022 12:29)  T(F): 99.7 (29 Dec 2022 12:29), Max: 99.7 (29 Dec 2022 12:29)  HR: 87 (29 Dec 2022 12:29) (82 - 93)  BP: 139/76 (29 Dec 2022 12:29) (108/62 - 158/84)  BP(mean): --  RR: 17 (29 Dec 2022 12:29) (17 - 18)  SpO2: 97% (29 Dec 2022 12:29) (94% - 98%)    Parameters below as of 29 Dec 2022 12:29  Patient On (Oxygen Delivery Method): room air

## 2022-12-29 NOTE — PROGRESS NOTE ADULT - SUBJECTIVE AND OBJECTIVE BOX
NEPHROLOGY     Patient seen and examined, awake, appears comfortable, no sob, in no acute distres. HD yesterday removed 0.7L, unable to tolerated ordered UF goal due to drop in BP.     MEDICATIONS  (STANDING):  allopurinol 100 milliGRAM(s) Oral daily  aspirin  chewable 81 milliGRAM(s) Oral daily  atorvastatin 80 milliGRAM(s) Oral at bedtime  carvedilol 12.5 milliGRAM(s) Oral every 12 hours  chlorhexidine 4% Liquid 1 Application(s) Topical daily  clopidogrel Tablet 75 milliGRAM(s) Oral daily  famotidine    Tablet 10 milliGRAM(s) Oral daily  gabapentin 200 milliGRAM(s) Oral two times a day  heparin   Injectable 5000 Unit(s) SubCutaneous every 8 hours  influenza   Vaccine 0.5 milliLiter(s) IntraMuscular once  insulin glargine Injectable (LANTUS) 17 Unit(s) SubCutaneous at bedtime  insulin lispro (ADMELOG) corrective regimen sliding scale   SubCutaneous Before meals and at bedtime  insulin lispro Injectable (ADMELOG) 6 Unit(s) SubCutaneous three times a day before meals  lidocaine/prilocaine Cream 1 Application(s) Topical <User Schedule>  polyethylene glycol 3350 17 Gram(s) Oral two times a day  senna 2 Tablet(s) Oral at bedtime  tamsulosin 0.4 milliGRAM(s) Oral at bedtime  valsartan 40 milliGRAM(s) Oral daily    VITALS:  T(C): , Max: 37.6 (12-29-22 @ 12:29)  T(F): , Max: 99.7 (12-29-22 @ 12:29)  HR: 87 (12-29-22 @ 12:29)  BP: 139/76 (12-29-22 @ 12:29)  RR: 17 (12-29-22 @ 12:29)  SpO2: 97% (12-29-22 @ 12:29)    I and O's:    12-28 @ 07:01  -  12-29 @ 07:00  --------------------------------------------------------  IN: 240 mL / OUT: 700 mL / NET: -460 mL    12-29 @ 07:01  -  12-29 @ 15:30  --------------------------------------------------------  IN: 300 mL / OUT: 0 mL / NET: 300 mL    PHYSICAL EXAM:  Constitutional: NAD  HEENT: PERRLA, EOMI,  MMM  Neck: No LAD, No JVD  Respiratory: CTAB  Cardiovascular: S1 and S2  Gastrointestinal: BS+, soft, NT/ND  Extremities: No peripheral edema  Neurological: A/O x 3, no focal deficits  Psychiatric: Normal mood, normal affect  : No Jay  Skin: No rashes  Access: +AVF +thrill    LABS:                        7.8    10.64 )-----------( 407      ( 29 Dec 2022 06:40 )             25.2     12-28    139  |  97  |  73<H>  ----------------------------<  95  4.8   |  25  |  7.13<H>    Ca    9.2      28 Dec 2022 10:02    TPro  6.4  /  Alb  x   /  TBili  x   /  DBili  x   /  AST  x   /  ALT  x   /  AlkPhos  x   12-29    RADIOLOGY & ADDITIONAL STUDIES:    ACC: [id number] EXAM:  MR ACUTE CORD COMPRESSION                          PROCEDURE DATE:  12/28/2022          INTERPRETATION:  CLINICAL INDICATION: Trauma, worsening weakness, unable   to walk    Magnetic resonance imaging of the cervical, thoracic,and lumbosacral   spine was carried out with sagittal surface coil imaging from  C 1 to   S3-4 using the cord compression protocol of sagittal T1 and STIR images   with axial T1 and T2-weighted series through the lumbar spine on a 1.5   Mercedes magnet.    The cervical, thoracic, and lumbosacral vertebral bodies are normal in   height and signal intensity. There is a small osteophyte at the inferior   endplate of L4 with bright signal intensity on the T1-weighted images   suggesting fatty degenerative change. There are no disc herniations or   spinal stenosis. There are no acute fractures or dislocations. There is   no marrow edema. The cervical and thoracic cord size, contour, and signal   characteristics. The conus terminates normally at the T12-L1 level.    Mild degenerative disc disease at L5-S1 is noted.    Paraspinal soft tissues are unremarkable.      Impression: No cord or cauda equina compression    --- End of Report ---      LEANDER HAWKINS MD; Attending Radiologist  This document has been electronically signed. Dec 29 2022  9:20AM    ASSESSMENT/PLAN:  62 year-old man with history of multiple medical issues including hypertension, diabetes mellitus, coronary artery diease, congestive heart failure with reduced EF, gout, and ESRD presents from PCP office for fall and is on a/c and has subdural hematoma .  ESRD ---TTS     1 Nuerosurg- Off A/C at present;  Started on Keppra   2 Renal -Plan for HD tomorrow, UF 1 liter. Lytes acceptable as of late   3 CVS-  no pleural effusion , amlodipine held , BP soft at times, stable at present     Cyndy Robbins NP-C  [facility]  [phone]
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
## 2022-12-29 NOTE — BH CONSULTATION LIAISON ASSESSMENT NOTE - HPI (INCLUDE ILLNESS QUALITY, SEVERITY, DURATION, TIMING, CONTEXT, MODIFYING FACTORS, ASSOCIATED SIGNS AND SYMPTOMS)
Patient is 62 year-old man with history of multiple medical issues including hypertension, diabetes mellitus, coronary artery diease, congestive heart failure with reduced EF, gout, and ESRD presents from PCP office for fall and is on a/c and has subdural hematoma, with psychiatry now consulted for evaluation of acute mental status changes.    On exam, patient is lethargic with delayed responses and speech productivity. Patient is able to state his name. However, pt does not know the name of the hospital or the date. Patient is unable to do serial sevens or spell the words "think" or "world" backwards or forwards. Patient is intermittently tearful when discussing his family. States "I love my wife. Everything is hard." Unable to elaborate. When resident came back into room after meeting with patient five minutes prior, patient did not remember resident Denies SIIP/HIIP. Denies AVH. Denies prior psychiatric hospitalizations or suicide attempts. Endorses anhedonia, fatigue, poor concentration, and depressed mood. Denies anxiety.

## 2022-12-29 NOTE — BH CONSULTATION LIAISON ASSESSMENT NOTE - SUMMARY
Patient is 62 year-old man with history of multiple medical issues including hypertension, diabetes mellitus, coronary artery diease, congestive heart failure with reduced EF, gout, and ESRD presents from PCP office for fall and is on a/c and has subdural hematoma, with psychiatry now consulted for evaluation of acute mental status changes. On exam, patient is oriented only to person. Exhibiting impaired attention and memory. Unable to do serial sevens or spell "world." Intermittently tearful when discussing family. Endorses depressive symptoms. Denies SIIP/HIIP. No prior psychiatric history. No prior substance use. At present, patient appears to be experiencing acute mental status changes resulting in deterioration of attention, concentration, and ability to engage in meaningful conversation. Patient's current mental status does not appear to be related to psychiatric etiology at this time.    Patient would benefit from a full neurological workup to determine the cause.  Patient is 62 year-old man with history of multiple medical issues including hypertension, diabetes mellitus, coronary artery diease, congestive heart failure with reduced EF, gout, and ESRD presents from PCP office for fall and is on a/c and has subdural hematoma, with psychiatry now consulted for evaluation of acute mental status changes. On exam, patient is oriented only to person. Exhibiting impaired attention and memory. Unable to do serial sevens or spell "world." Intermittently tearful when discussing family. Endorses depressive symptoms. Denies SIIP/HIIP. No prior psychiatric history. No prior substance use. At present, patient appears to be experiencing acute mental status changes resulting in deterioration of attention, concentration, and ability to engage in meaningful conversation. Patient's current mental status does not appear to be related to psychiatric etiology at this time.  Patient would benefit from a full medical/neurological workup to determine the cause of delirium.

## 2022-12-29 NOTE — BH CONSULTATION LIAISON ASSESSMENT NOTE - NSBHATTESTCOMMENTATTENDFT_PSY_A_CORE
62M , domiciled with spouse, retired, has adult children, denies PPHx, denies active substance abuse, with PMH significant for [id number] presenting with XXXXXXXXX, psychiatry consulted to evaluate for altered mental status.  Pt on interview oriented to self only, appears perplexed, does not recall resident who interviewed him moments prior, unable to provide basic demographic information such as children’s ages and whom he lives with.  Pt inattentive with fluctuating MS.  Denies SI/HI or AVH.  Presentation c/w delirium 2/2 [facility].  Recs: C/w neuro workup, EEG, MRI, LP.  May use PRN: Ativan 1mg-2mg PO/IV q6h PRN severe agitation only.  Agree with resident’s assessment and plan as above.  62M , domiciled with spouse, retired, has adult children, denies PPHx, denies active substance abuse, with PMH significant for CAD s/p 2 drug eluding stents 11/2022 on asa/plavix, CHF, DM, gout, ESRD on dialysis s/p fall, [facility] SDH, psychiatry consulted to evaluate for altered mental status.  Pt on interview oriented to self only, appears perplexed, does not recall resident who interviewed him moments prior, unable to provide basic demographic information such as children’s ages and whom he lives with.  Pt inattentive with fluctuating MS.  Denies SI/HI or AVH.  Denies prior psych hx including depression, anxiety, psychosis.  Presentation c/w delirium 2/2 [facility] with affective lability.  Recs: C/w medical/neuro workup for AMS, EEG, MRI, LP.  May use PRN: Ativan 1mg-2mg PO/IV q6h PRN severe agitation only.  Agree with resident’s assessment and plan as above.

## 2022-12-29 NOTE — PROGRESS NOTE ADULT - SUBJECTIVE AND OBJECTIVE BOX
[facility]    REVIEW OF SYSTEMS:  CONSTITUTIONAL: No fever,  no  weight loss  ENT:  No  tinnitus,   no   vertigo  NECK: No pain or stiffness  RESPIRATORY: No cough, wheezing, chills or hemoptysis;    No Shortness of Breath  CARDIOVASCULAR: No chest pain, palpitations, dizziness  GASTROINTESTINAL: No abdominal or epigastric pain. No nausea, vomiting, or hematemesis; No diarrhea  No melena or hematochezia.  GENITOURINARY: No dysuria, frequency, hematuria, or incontinence  NEUROLOGICAL: No headaches  SKIN: No itching,  no   rash  LYMPH Nodes: No enlarged glands  ENDOCRINE: No heat or cold intolerance  MUSCULOSKELETAL: No joint pain or swelling  PSYCHIATRIC: No depression, anxiety  HEME/LYMPH: No easy bruising, or bleeding gums  ALLERGY AND IMMUNOLOGIC: No hives or eczema	    MEDICATIONS  (STANDING):  allopurinol 100 milliGRAM(s) Oral daily  aspirin  chewable 81 milliGRAM(s) Oral daily  atorvastatin 80 milliGRAM(s) Oral at bedtime  carvedilol 12.5 milliGRAM(s) Oral every 12 hours  chlorhexidine 4% Liquid 1 Application(s) Topical daily  clopidogrel Tablet 75 milliGRAM(s) Oral daily  famotidine    Tablet 10 milliGRAM(s) Oral daily  gabapentin 200 milliGRAM(s) Oral two times a day  heparin   Injectable 5000 Unit(s) SubCutaneous every 8 hours  influenza   Vaccine 0.5 milliLiter(s) IntraMuscular once  insulin glargine Injectable (LANTUS) 17 Unit(s) SubCutaneous at bedtime  insulin lispro (ADMELOG) corrective regimen sliding scale   SubCutaneous Before meals and at bedtime  insulin lispro Injectable (ADMELOG) 6 Unit(s) SubCutaneous three times a day before meals  lidocaine/prilocaine Cream 1 Application(s) Topical <User Schedule>  polyethylene glycol 3350 17 Gram(s) Oral two times a day  senna 2 Tablet(s) Oral at bedtime  tamsulosin 0.4 milliGRAM(s) Oral at bedtime  valsartan 40 milliGRAM(s) Oral daily    MEDICATIONS  (PRN):  acetaminophen     Tablet .. 650 milliGRAM(s) Oral every 6 hours PRN Temp greater or equal to 38C (100.4F), Mild Pain (1 - 3)      Vital Signs Last 24 Hrs  T(C): 37.5 (29 Dec 2022 04:29), Max: 37.5 (29 Dec 2022 04:29)  T(F): 99.5 (29 Dec 2022 04:29), Max: 99.5 (29 Dec 2022 04:29)  HR: 85 (29 Dec 2022 04:29) (82 - 93)  BP: 158/84 (29 Dec 2022 04:29) (105/54 - 160/67)  BP(mean): --  RR: 18 (29 Dec 2022 04:29) (17 - 18)  SpO2: 96% (29 Dec 2022 04:29) (94% - 100%)    Parameters below as of 29 Dec 2022 04:29  Patient On (Oxygen Delivery Method): room air      CAPILLARY BLOOD GLUCOSE      POCT Blood Glucose.: 173 mg/dL (28 Dec 2022 21:50)  POCT Blood Glucose.: 137 mg/dL (28 Dec 2022 18:17)  POCT Blood Glucose.: 129 mg/dL (28 Dec 2022 13:52)  POCT Blood Glucose.: 101 mg/dL (28 Dec 2022 08:48)    I&O's Summary    28 Dec 2022 07:01  -  29 Dec 2022 07:00  --------------------------------------------------------  IN: 240 mL / OUT: 700 mL / NET: -460 mL          Appearance: Normal	  HEENT:   Normal oral mucosa, PERRL, EOMI	  Lymphatic: No lymphadenopathy  Cardiovascular: Normal S1 S2, No JVD  Respiratory: Lungs clear to auscultation	  Psychiatry: A & O x 3, Mood & affect appropriate  Gastrointestinal:  Soft, Non-tender, + BS	  Skin: No rash, No ecchymoses	  Extremities:   at baseline  Vascular: Peripheral pulses palpable bilaterally    LABS:                        7.8    10.64 )-----------( 407      ( 29 Dec 2022 06:40 )             25.2     12-28    139  |  97  |  73<H>  ----------------------------<  95  4.8   |  25  |  7.13<H>    Ca    9.2      28 Dec 2022 10:02                              Consultant(s) Notes Reviewed:      Care Discussed with Consultants/Other Providers:

## 2022-12-29 NOTE — BH CONSULTATION LIAISON ASSESSMENT NOTE - NSBHPSYCHOLCOGABN_PSY_A_CORE
disoriented to time/disoriented to place disoriented to time/disoriented to place/disoriented to situation

## 2022-12-30 LAB
GLUCOSE BLDC GLUCOMTR-MCNC: 120 MG/DL — HIGH (ref 70–99)
GLUCOSE BLDC GLUCOMTR-MCNC: 147 MG/DL — HIGH (ref 70–99)
GLUCOSE BLDC GLUCOMTR-MCNC: 160 MG/DL — HIGH (ref 70–99)
GLUCOSE BLDC GLUCOMTR-MCNC: 180 MG/DL — HIGH (ref 70–99)
GLUCOSE BLDC GLUCOMTR-MCNC: 88 MG/DL — SIGNIFICANT CHANGE UP (ref 70–99)
SARS-COV-2 RNA SPEC QL NAA+PROBE: SIGNIFICANT CHANGE UP

## 2022-12-30 PROCEDURE — 99233 SBSQ HOSP IP/OBS HIGH 50: CPT

## 2022-12-30 RX ADMIN — Medication 2: at 00:35

## 2022-12-30 RX ADMIN — CARVEDILOL PHOSPHATE 12.5 MILLIGRAM(S): 80 CAPSULE, EXTENDED RELEASE ORAL at 21:53

## 2022-12-30 RX ADMIN — HEPARIN SODIUM 5000 UNIT(S): 5000 INJECTION INTRAVENOUS; SUBCUTANEOUS at 13:19

## 2022-12-30 RX ADMIN — Medication 100 MILLIGRAM(S): at 12:12

## 2022-12-30 RX ADMIN — FAMOTIDINE 10 MILLIGRAM(S): 10 INJECTION INTRAVENOUS at 12:11

## 2022-12-30 RX ADMIN — TAMSULOSIN HYDROCHLORIDE 0.4 MILLIGRAM(S): 0.4 CAPSULE ORAL at 21:54

## 2022-12-30 RX ADMIN — VALSARTAN 40 MILLIGRAM(S): 80 TABLET ORAL at 09:30

## 2022-12-30 RX ADMIN — ATORVASTATIN CALCIUM 80 MILLIGRAM(S): 80 TABLET, FILM COATED ORAL at 21:54

## 2022-12-30 RX ADMIN — HEPARIN SODIUM 5000 UNIT(S): 5000 INJECTION INTRAVENOUS; SUBCUTANEOUS at 05:52

## 2022-12-30 RX ADMIN — Medication 6 UNIT(S): at 09:29

## 2022-12-30 RX ADMIN — INSULIN GLARGINE 17 UNIT(S): 100 INJECTION, SOLUTION SUBCUTANEOUS at 00:34

## 2022-12-30 RX ADMIN — CHLORHEXIDINE GLUCONATE 1 APPLICATION(S): 213 SOLUTION TOPICAL at 13:00

## 2022-12-30 RX ADMIN — GABAPENTIN 200 MILLIGRAM(S): 400 CAPSULE ORAL at 17:29

## 2022-12-30 RX ADMIN — LIDOCAINE AND PRILOCAINE CREAM 1 APPLICATION(S): 25; 25 CREAM TOPICAL at 00:35

## 2022-12-30 RX ADMIN — GABAPENTIN 200 MILLIGRAM(S): 400 CAPSULE ORAL at 05:52

## 2022-12-30 RX ADMIN — HEPARIN SODIUM 5000 UNIT(S): 5000 INJECTION INTRAVENOUS; SUBCUTANEOUS at 21:54

## 2022-12-30 RX ADMIN — INSULIN GLARGINE 17 UNIT(S): 100 INJECTION, SOLUTION SUBCUTANEOUS at 21:54

## 2022-12-30 RX ADMIN — POLYETHYLENE GLYCOL 3350 17 GRAM(S): 17 POWDER, FOR SOLUTION ORAL at 17:28

## 2022-12-30 RX ADMIN — Medication 81 MILLIGRAM(S): at 12:11

## 2022-12-30 RX ADMIN — CARVEDILOL PHOSPHATE 12.5 MILLIGRAM(S): 80 CAPSULE, EXTENDED RELEASE ORAL at 09:30

## 2022-12-30 RX ADMIN — CLOPIDOGREL BISULFATE 75 MILLIGRAM(S): 75 TABLET, FILM COATED ORAL at 12:12

## 2022-12-30 NOTE — PROGRESS NOTE ADULT - PROBLEM SELECTOR PROBLEM 1
DM (diabetes mellitus)

## 2022-12-30 NOTE — PROVIDER CONTACT NOTE (OTHER) - ASSESSMENT
Pt A&Ox2. Pt without chest pain, sob, headache.
Pt asymptomatic, denies chest pain, palpitations, sob, general discomfort. Refer to flowsheet for vitals.
pt remains oriented; pt RUE strength is decreased to 3/5 and has prominent drift compared to 0000. pt verbalized diminished feeling mild facial droop, full strength on L side and some effort on RLE remains from initial adm assessment.
Pt asymptomatic, A&Ox4, denies sob, lightheadedness, dizziness, or other discomfort.
Pt with b/l weakness & noticeable drifts to b/l upper extremities. Pt unable to feed himself breakfast & hold a cup.

## 2022-12-30 NOTE — PROGRESS NOTE ADULT - PROBLEM SELECTOR PLAN 3
Suggest to continue medications, monitoring, FU primary team recommendations. .

## 2022-12-30 NOTE — PROVIDER CONTACT NOTE (OTHER) - BACKGROUND
Pt admitted s/p fall & found to have a right 1.3cm subdural hematoma with 2mm midline shift. Pmh: CAD s/p stents only on Aspirin.
Pt admitted for b/l knee pain s/p fall. Pt also found to have right 1.3 subdural hematoma. Pt hx of ESRD on dialysis.
pt adm for R sided subdural s/p fall. pt has PMH of stents on aspirin and plavix, CKD on hemodialysis;
Pt admitted for acute renal failure.
Pt admitted s/p fall found to have a 1.3 cm SDH with 2mm midline shift. Pt with hx of CAD x2 recent stents.
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
## 2022-12-30 NOTE — PROGRESS NOTE ADULT - PROBLEM SELECTOR PLAN 1
Will DC pre meal bolus insulin, will continue current insulin regimen for now. Will continue monitoring  blood sugars, will Follow up.  Patient counseled for compliance with consistent low carb diet.  .
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
## 2022-12-30 NOTE — EEG REPORT - NS EEG TEXT BOX
[facility]  Comprehensive Epile[facility]  Report of Routine EEG     [facility]: [address], [place] [address], Phone [phone]  [place] Office: [address], [address], [place] [address] Phone [phone]    [facility]: [address] [place] [address], Phone [phone]  [place] Office: [address] [place] [address], Phone [phone]    Patient Name: Jeramie Owens    Age: 62 year  : 1960  EEG #: [id number]    Study Date: 2022   Duration: 50 minutes    Technical Information:					  On Instrument: [id number]  Placement and Labeling of Electrodes:  The EEG was performed utilizing 20 channels referential EEG connections (coronal over temporal over parasagittal montage) using all standard 10-20 electrode placements with EKG.  Recording was at a sampling rate of 256 samples per second per channel.  Jayant and seizure detection algorithms were utilized.    History:  VEEG performed at bedside  COR PT awke and confused  Photic stim performed   No HV stim performed due to covid protocols  63 Y/O Male PMH BPH, CAD, ASHD, HPT, CKD, GERD, Myocardial infarction, pulmonary HPT, vitamin D deficiency, Nephrolithiasis, Diabetic retinopathy, Gout, diabetes mellitus type 2, Dyslipidemia  P/W increased lethargy  R/O seizure    Pertinent Medication  NEURONTIN  TYLENOL  LIPITOR  ZYLOPRIM    Study Interpretation:  Findings: The background was continuous and reactive. No posterior dominant rhythm seen.    Background Slowing:  Diffuse theta and delta slowing.    Focal Slowing:   None were present.    Sleep Background:  Drowsiness and stage II sleep transients were not recorded.    Other Non-Epileptiform Findings:  None were present.    Interictal Epileptiform Activity:   None were present.    Events:  No event or seizure recorded.    Activation Procedures:   Hyperventilation was not performed.    Photic stimulation was not performed.     Artifacts:  Intermittent myogenic and movement artifacts were noted.    ECG:  The heart rate on single channel ECG was predominantly between  BPM.    EEG Summary / Classification:  Abnormal EEG in an confused patient.  - Mild – moderate generalized slowing.    EEG Impression / Clinical Correlate:  Abnormal EEG study.  Mild-moderate nonspecific diffuse or multifocal cerebral dysfunction.   No epileptiform pattern or seizure seen.    ________________________________________    Elvin Mccrary MD  Attending Physician, [facility]

## 2022-12-30 NOTE — PROVIDER CONTACT NOTE (OTHER) - ACTION/TREATMENT ORDERED:
continue to monitor
DEJA Chen aware. STAT Covid PCR ordered and obtained. STAT PCR also ordered for pt in 349d. Will continue to monitor pt.
RADHA Nichols aware & urgent CT head ordered. Pt's spouse at bedside states that  "does this every now and then". Safety maintained.
Give midodrine as per primary team notes.
RADHA Nichols aware & ordered Mag to be added on to AM labs. No interventions ordered. Safety maintained.

## 2022-12-30 NOTE — PROGRESS NOTE ADULT - SUBJECTIVE AND OBJECTIVE BOX
NEPHROLOGY     Patient seen and examined, awake, appears comfortable,  hd today     MEDICATIONS  (STANDING):  allopurinol 100 milliGRAM(s) Oral daily  aspirin  chewable 81 milliGRAM(s) Oral daily  atorvastatin 80 milliGRAM(s) Oral at bedtime  carvedilol 12.5 milliGRAM(s) Oral every 12 hours  chlorhexidine 4% Liquid 1 Application(s) Topical daily  clopidogrel Tablet 75 milliGRAM(s) Oral daily  famotidine    Tablet 10 milliGRAM(s) Oral daily  gabapentin 200 milliGRAM(s) Oral two times a day  heparin   Injectable 5000 Unit(s) SubCutaneous every 8 hours  influenza   Vaccine 0.5 milliLiter(s) IntraMuscular once  insulin glargine Injectable (LANTUS) 17 Unit(s) SubCutaneous at bedtime  insulin lispro (ADMELOG) corrective regimen sliding scale   SubCutaneous Before meals and at bedtime  insulin lispro Injectable (ADMELOG) 6 Unit(s) SubCutaneous three times a day before meals  lidocaine/prilocaine Cream 1 Application(s) Topical <User Schedule>  polyethylene glycol 3350 17 Gram(s) Oral two times a day  senna 2 Tablet(s) Oral at bedtime  tamsulosin 0.4 milliGRAM(s) Oral at bedtime  valsartan 40 milliGRAM(s) Oral daily    VITALS:  T(C): , Max: 37.6 (12-29-22 @ 12:29)  T(F): , Max: 99.7 (12-29-22 @ 12:29)  HR: 87 (12-29-22 @ 12:29)  BP: 139/76 (12-29-22 @ 12:29)  RR: 17 (12-29-22 @ 12:29)  SpO2: 97% (12-29-22 @ 12:29)    I and O's:    12-28 @ 07:01  -  12-29 @ 07:00  --------------------------------------------------------  IN: 240 mL / OUT: 700 mL / NET: -460 mL    12-29 @ 07:01  -  12-29 @ 15:30  --------------------------------------------------------  IN: 300 mL / OUT: 0 mL / NET: 300 mL    PHYSICAL EXAM:  Constitutional: NAD  HEENT: PERRLA, EOMI,  MMM  Neck: No LAD, No JVD  Respiratory: CTAB  Cardiovascular: S1 and S2  Gastrointestinal: BS+, soft, NT/ND  Extremities: No peripheral edema  Neurological: A/O x 3, no focal deficits  Psychiatric: Normal mood, normal affect  : No Jay  Skin: No rashes  Access: +AVF +thrill    LABS:                        7.8    10.64 )-----------( 407      ( 29 Dec 2022 06:40 )             25.2     12-28    139  |  97  |  73<H>  ----------------------------<  95  4.8   |  25  |  7.13<H>    Ca    9.2      28 Dec 2022 10:02    TPro  6.4  /  Alb  x   /  TBili  x   /  DBili  x   /  AST  x   /  ALT  x   /  AlkPhos  x   12-29    RADIOLOGY & ADDITIONAL STUDIES:    ACC: [id number] EXAM:  MR ACUTE CORD COMPRESSION                          PROCEDURE DATE:  12/28/2022          INTERPRETATION:  CLINICAL INDICATION: Trauma, worsening weakness, unable   to walk    Magnetic resonance imaging of the cervical, thoracic,and lumbosacral   spine was carried out with sagittal surface coil imaging from  C 1 to   S3-4 using the cord compression protocol of sagittal T1 and STIR images   with axial T1 and T2-weighted series through the lumbar spine on a 1.5   Mercedes magnet.    The cervical, thoracic, and lumbosacral vertebral bodies are normal in   height and signal intensity. There is a small osteophyte at the inferior   endplate of L4 with bright signal intensity on the T1-weighted images   suggesting fatty degenerative change. There are no disc herniations or   spinal stenosis. There are no acute fractures or dislocations. There is   no marrow edema. The cervical and thoracic cord size, contour, and signal   characteristics. The conus terminates normally at the T12-L1 level.    Mild degenerative disc disease at L5-S1 is noted.    Paraspinal soft tissues are unremarkable.      Impression: No cord or cauda equina compression    --- End of Report ---      LEANDER HAWKINS MD; Attending Radiologist  This document has been electronically signed. Dec 29 2022  9:20AM    ASSESSMENT/PLAN:  62 year-old man with history of multiple medical issues including hypertension, diabetes mellitus, coronary artery diease, congestive heart failure with reduced EF, gout, and ESRD presents from PCP office for fall and is on a/c and has subdural hematoma .  ESRD ---TTS     1 Nuerosurg- Off A/C at present;  Started on Keppra   2 Renal - HD today , UF 1 liter. Lytes acceptable as of late   3 CVS-  no pleural effusion , amlodipine held , BP soft at times, stable at present       [facility]  Office: [phone]     NEPHROLOGY     Patient seen and examined, awake, appears comfortable,  hd today     MEDICATIONS  (STANDING):  allopurinol 100 milliGRAM(s) Oral daily  aspirin  chewable 81 milliGRAM(s) Oral daily  atorvastatin 80 milliGRAM(s) Oral at bedtime  carvedilol 12.5 milliGRAM(s) Oral every 12 hours  chlorhexidine 4% Liquid 1 Application(s) Topical daily  clopidogrel Tablet 75 milliGRAM(s) Oral daily  famotidine    Tablet 10 milliGRAM(s) Oral daily  gabapentin 200 milliGRAM(s) Oral two times a day  heparin   Injectable 5000 Unit(s) SubCutaneous every 8 hours  influenza   Vaccine 0.5 milliLiter(s) IntraMuscular once  insulin glargine Injectable (LANTUS) 17 Unit(s) SubCutaneous at bedtime  insulin lispro (ADMELOG) corrective regimen sliding scale   SubCutaneous Before meals and at bedtime  insulin lispro Injectable (ADMELOG) 6 Unit(s) SubCutaneous three times a day before meals  lidocaine/prilocaine Cream 1 Application(s) Topical <User Schedule>  polyethylene glycol 3350 17 Gram(s) Oral two times a day  senna 2 Tablet(s) Oral at bedtime  tamsulosin 0.4 milliGRAM(s) Oral at bedtime  valsartan 40 milliGRAM(s) Oral daily    VITALS:  T(C): , Max: 37.6 (12-29-22 @ 12:29)  T(F): , Max: 99.7 (12-29-22 @ 12:29)  HR: 87 (12-29-22 @ 12:29)  BP: 139/76 (12-29-22 @ 12:29)  RR: 17 (12-29-22 @ 12:29)  SpO2: 97% (12-29-22 @ 12:29)    I and O's:    12-28 @ 07:01  -  12-29 @ 07:00  --------------------------------------------------------  IN: 240 mL / OUT: 700 mL / NET: -460 mL    12-29 @ 07:01  -  12-29 @ 15:30  --------------------------------------------------------  IN: 300 mL / OUT: 0 mL / NET: 300 mL    PHYSICAL EXAM:  Constitutional: NAD  HEENT: PERRLA, EOMI,  MMM  Neck: No LAD, No JVD  Respiratory: CTAB  Cardiovascular: S1 and S2  Gastrointestinal: BS+, soft, NT/ND  Extremities: No peripheral edema  Neurological: A/O x 3, no focal deficits  Psychiatric: Normal mood, normal affect  : No Jay  Skin: No rashes  Access: +AVF +thrill    LABS:                        7.8    10.64 )-----------( 407      ( 29 Dec 2022 06:40 )             25.2     12-28    139  |  97  |  73<H>  ----------------------------<  95  4.8   |  25  |  7.13<H>    Ca    9.2      28 Dec 2022 10:02    TPro  6.4  /  Alb  x   /  TBili  x   /  DBili  x   /  AST  x   /  ALT  x   /  AlkPhos  x   12-29    RADIOLOGY & ADDITIONAL STUDIES:    ACC: [id number] EXAM:  MR ACUTE CORD COMPRESSION                          PROCEDURE DATE:  12/28/2022          INTERPRETATION:  CLINICAL INDICATION: Trauma, worsening weakness, unable   to walk    Magnetic resonance imaging of the cervical, thoracic,and lumbosacral   spine was carried out with sagittal surface coil imaging from  C 1 to   S3-4 using the cord compression protocol of sagittal T1 and STIR images   with axial T1 and T2-weighted series through the lumbar spine on a 1.5   Mercedes magnet.    The cervical, thoracic, and lumbosacral vertebral bodies are normal in   height and signal intensity. There is a small osteophyte at the inferior   endplate of L4 with bright signal intensity on the T1-weighted images   suggesting fatty degenerative change. There are no disc herniations or   spinal stenosis. There are no acute fractures or dislocations. There is   no marrow edema. The cervical and thoracic cord size, contour, and signal   characteristics. The conus terminates normally at the T12-L1 level.    Mild degenerative disc disease at L5-S1 is noted.    Paraspinal soft tissues are unremarkable.      Impression: No cord or cauda equina compression    --- End of Report ---      LEANDER HAWKINS MD; Attending Radiologist  This document has been electronically signed. Dec 29 2022  9:20AM    ASSESSMENT/PLAN:  62 year-old man with history of multiple medical issues including hypertension, diabetes mellitus, coronary artery diease, congestive heart failure with reduced EF, gout, and ESRD presents from PCP office for fall and is on a/c and has subdural hematoma .  ESRD ---TTS     1 Nuerosurg- Off A/C at present;  Started on Keppra   2 Renal - HD today , UF 1 liter. Lytes acceptable as of late ( as per primary team  will move schedule  Hd tomorrow as pt is dc to [facility] tomorrow ,then  will resume HD  [facility])  3 CVS-  no pleural effusion , amlodipine held , BP soft at times, stable at present       Viviendelaney Montana  [facility]  Office: [phone]

## 2022-12-30 NOTE — PROGRESS NOTE ADULT - SUBJECTIVE AND OBJECTIVE BOX
NEUROLOGY FOLLOW-UP CONSULT NOTE    RFC: acute on chronic worsening of generalized weakness    Interval history: No acute neurologic events overnight.    Meds:  MEDICATIONS  (STANDING):  allopurinol 100 milliGRAM(s) Oral daily  aspirin  chewable 81 milliGRAM(s) Oral daily  atorvastatin 80 milliGRAM(s) Oral at bedtime  carvedilol 12.5 milliGRAM(s) Oral every 12 hours  chlorhexidine 4% Liquid 1 Application(s) Topical daily  clopidogrel Tablet 75 milliGRAM(s) Oral daily  famotidine    Tablet 10 milliGRAM(s) Oral daily  gabapentin 200 milliGRAM(s) Oral two times a day  heparin   Injectable 5000 Unit(s) SubCutaneous every 8 hours  influenza   Vaccine 0.5 milliLiter(s) IntraMuscular once  insulin glargine Injectable (LANTUS) 17 Unit(s) SubCutaneous at bedtime  insulin lispro (ADMELOG) corrective regimen sliding scale   SubCutaneous Before meals and at bedtime  lidocaine/prilocaine Cream 1 Application(s) Topical <User Schedule>  polyethylene glycol 3350 17 Gram(s) Oral two times a day  senna 2 Tablet(s) Oral at bedtime  tamsulosin 0.4 milliGRAM(s) Oral at bedtime  valsartan 40 milliGRAM(s) Oral daily    MEDICATIONS  (PRN):  acetaminophen     Tablet .. 650 milliGRAM(s) Oral every 6 hours PRN Temp greater or equal to 38C (100.4F), Mild Pain (1 - 3)      PMHx/PSHx/FHx/SHx:  End-stage renal disease    H/o or current diagnosis of HF- ACEI/ARB contraindication unknown    H/o or current diagnosis of HF- Contraindication to ACEI/ARBs    Family history of cardiac disorder in father (Father)    Handoff    MEWS Score    Diabetes Mellitus Type II, Uncontrolled    Dyslipidemia    s/p Angioplasty with Stent    HTN (hypertension)    Gout    Diabetic retinopathy    GERD (gastroesophageal reflux disease)    Nephrolithiasis    Vitamin D deficiency    Hepatitis    CKD (chronic kidney disease)    Ischemic cardiomyopathy    ASHD (arteriosclerotic heart disease)    Pulmonary hypertension    Myocardial infarction    Diabetes    CAD (coronary artery disease)    Gout    BPH (benign prostatic hyperplasia)    HTN (hypertension)    Delirium    Delirium due to other cause    ESRD on dialysis    SDH (subdural hematoma)    DM (diabetes mellitus)    Head injury, closed    HTN (hypertension)    Retinal Hemorrhage    S/P coronary artery stent placement    History of eye surgery    H/O lithotripsy    Diabetes with retinopathy    CHF with cardiomyopathy    Stented coronary artery    KNEE PAIN    27    Room Service Assist    [email or web address]        Allergies:  No Known Drug Allergies  Seafood (Unknown)  shellfish (Unknown)      ROS: All systems negative except as documented in Interval history    O:  T(C): 37.2 (12-30-22 @ 11:48), Max: 37.2 (12-30-22 @ 04:58)  HR: 83 (12-30-22 @ 11:49) (73 - 83)  BP: 125/79 (12-30-22 @ 11:48) (125/79 - 138/75)  RR: 18 (12-30-22 @ 11:48) (18 - 19)  SpO2: 96% (12-30-22 @ 11:49) (95% - 97%)    Focused neurologic exam:  MS - AAO x3, speech fluent, rep/naming intact, follows commands, attn/conc/recent and remote memory/fund of knowledge WNL  CN - PERRLA, EOMI, VFF, face sens/str/hearing WNL b/l, tongue/palate midline, trap 5/5 b/l  Motor - Normal bulk/tone, B/L bicep &  4+/5, b/l tricep 3+/5, deltoid 3/5, b/l LE 2/5, dorsiflexion and plantar flexion 4/5   Sens - LT intact all  DTR's - 1+ all and upgoing b/l plantar response  Coord - FtN intact b/l, intention tremors b/l   Gait and station - not assessed due to fall risk    Pertinent labs/studies:    LABS:  12-29 @ 06:45 Creatine 71 U/L [30 - 200]  cret                        7.8    10.64 )-----------( 407      ( 29 Dec 2022 06:40 )             25.2         TPro  6.4  /  Alb  x   /  TBili  x   /  DBili  x   /  AST  x   /  ALT  x   /  AlkPhos  x   12-29     MR Acute Spinal Cord Compression (12.28.22 @ 16:36)   The cervical, thoracic, and lumbosacral vertebral bodies are normal in   height and signal intensity. There is a small osteophyte at the inferior   endplate of L4 with bright signal intensity on the T1-weighted images   suggesting fatty degenerative change. There are no disc herniations or   spinal stenosis. There are no acute fractures or dislocations. There is   no marrow edema. The cervical and thoracic cord size, contour, and signal   characteristics. The conus terminates normally at the T12-L1 level.    Mild degenerative disc disease at L5-S1 is noted.    Paraspinal soft tissues are unremarkable.      Impression: No cord or cauda equina compression

## 2022-12-30 NOTE — PROVIDER CONTACT NOTE (OTHER) - REASON
Pt with 7 beats WCT on telemetry monitor.
change in neuro exam
Pt with positive Covid PCR test.
Change in previously documented neuro exam
Hypotensive

## 2022-12-30 NOTE — PROGRESS NOTE ADULT - SUBJECTIVE AND OBJECTIVE BOX
afberile    REVIEW OF SYSTEMS:  CONSTITUTIONAL: No fever,  no  weight loss  ENT:  No  tinnitus,   no   vertigo  NECK: No pain or stiffness  RESPIRATORY: No cough, wheezing, chills or hemoptysis;    No Shortness of Breath  CARDIOVASCULAR: No chest pain, palpitations, dizziness  GASTROINTESTINAL: No abdominal or epigastric pain. No nausea, vomiting, or hematemesis; No diarrhea  No melena or hematochezia.  GENITOURINARY: No dysuria, frequency, hematuria, or incontinence  NEUROLOGICAL: No headaches  SKIN: No itching,  no   rash  LYMPH Nodes: No enlarged glands  ENDOCRINE: No heat or cold intolerance  MUSCULOSKELETAL: No joint pain or swelling  PSYCHIATRIC: No depression, anxiety  HEME/LYMPH: No easy bruising, or bleeding gums  ALLERGY AND IMMUNOLOGIC: No hives or eczema	    MEDICATIONS  (STANDING):  allopurinol 100 milliGRAM(s) Oral daily  aspirin  chewable 81 milliGRAM(s) Oral daily  atorvastatin 80 milliGRAM(s) Oral at bedtime  carvedilol 12.5 milliGRAM(s) Oral every 12 hours  chlorhexidine 4% Liquid 1 Application(s) Topical daily  clopidogrel Tablet 75 milliGRAM(s) Oral daily  famotidine    Tablet 10 milliGRAM(s) Oral daily  gabapentin 200 milliGRAM(s) Oral two times a day  heparin   Injectable 5000 Unit(s) SubCutaneous every 8 hours  influenza   Vaccine 0.5 milliLiter(s) IntraMuscular once  insulin glargine Injectable (LANTUS) 17 Unit(s) SubCutaneous at bedtime  insulin lispro (ADMELOG) corrective regimen sliding scale   SubCutaneous Before meals and at bedtime  insulin lispro Injectable (ADMELOG) 6 Unit(s) SubCutaneous three times a day before meals  lidocaine/prilocaine Cream 1 Application(s) Topical <User Schedule>  polyethylene glycol 3350 17 Gram(s) Oral two times a day  senna 2 Tablet(s) Oral at bedtime  tamsulosin 0.4 milliGRAM(s) Oral at bedtime  valsartan 40 milliGRAM(s) Oral daily    MEDICATIONS  (PRN):  acetaminophen     Tablet .. 650 milliGRAM(s) Oral every 6 hours PRN Temp greater or equal to 38C (100.4F), Mild Pain (1 - 3)      Vital Signs Last 24 Hrs  T(C): 37.2 (30 Dec 2022 04:58), Max: 37.6 (29 Dec 2022 12:29)  T(F): 99 (30 Dec 2022 04:58), Max: 99.7 (29 Dec 2022 12:29)  HR: 73 (30 Dec 2022 04:58) (73 - 89)  BP: 130/80 (30 Dec 2022 04:58) (126/82 - 139/76)  BP(mean): --  RR: 18 (30 Dec 2022 04:58) (17 - 19)  SpO2: 97% (30 Dec 2022 04:58) (95% - 97%)    Parameters below as of 30 Dec 2022 04:58  Patient On (Oxygen Delivery Method): room air      CAPILLARY BLOOD GLUCOSE      POCT Blood Glucose.: 160 mg/dL (30 Dec 2022 00:28)  POCT Blood Glucose.: 88 mg/dL (29 Dec 2022 22:45)  POCT Blood Glucose.: 83 mg/dL (29 Dec 2022 21:02)  POCT Blood Glucose.: 144 mg/dL (29 Dec 2022 17:39)  POCT Blood Glucose.: 100 mg/dL (29 Dec 2022 12:27)  POCT Blood Glucose.: 131 mg/dL (29 Dec 2022 08:57)    I&O's Summary    28 Dec 2022 07:01  -  29 Dec 2022 07:00  --------------------------------------------------------  IN: 240 mL / OUT: 700 mL / NET: -460 mL    29 Dec 2022 07:01  -  30 Dec 2022 05:31  --------------------------------------------------------  IN: 660 mL / OUT: 0 mL / NET: 660 mL          Appearance: Normal	  HEENT:   Normal oral mucosa, PERRL, EOMI	  Lymphatic: No lymphadenopathy  Cardiovascular: Normal S1 S2, No JVD  Respiratory: Lungs clear to auscultation	  Psychiatry: A & O x 3, Mood & affect appropriate  Gastrointestinal:  Soft, Non-tender, + BS	  Skin: No rash, No ecchymoses	  Vascular: Peripheral pulses palpable bilaterally    LABS:                        7.8    10.64 )-----------( 407      ( 29 Dec 2022 06:40 )             25.2     12-28    139  |  97  |  73<H>  ----------------------------<  95  4.8   |  25  |  7.13<H>    Ca    9.2      28 Dec 2022 10:02    TPro  6.4  /  Alb  x   /  TBili  x   /  DBili  x   /  AST  x   /  ALT  x   /  AlkPhos  x   12-29      CARDIAC MARKERS ( 29 Dec 2022 06:45 )  x     / x     / 71 U/L / x     / x            Lactate, Blood: 0.9 mmol/L (12-29 @ 06:45)          Thyroid Stimulating Hormone, Serum: 2.03 uIU/mL (12-29 @ 06:43)          Consultant(s) Notes Reviewed:      Care Discussed with Consultants/Other Providers:

## 2022-12-30 NOTE — PROVIDER CONTACT NOTE (OTHER) - RECOMMENDATIONS
?
Add on mag to AM labs.
Repeat CT head.
follow up ct; provider to bedside
Repeat PCR? Will monitor for signs and symptoms of covid-19.

## 2022-12-30 NOTE — PROGRESS NOTE ADULT - PROBLEM SELECTOR PLAN 2
Suggest to continue medications, monitoring, FU primary neurosurgical team recommendations. .
Monitored and FU by neuro/sx/ primary teams.
Suggest to continue medications, monitoring, FU primary neurosurgical team recommendations. .

## 2022-12-30 NOTE — PROGRESS NOTE ADULT - SUBJECTIVE AND OBJECTIVE BOX
Chief complaint    Patient is a 62y old  Male who presents with a chief complaint of fall (30 Dec 2022 13:28)   Review of systems  Patient in bed, appears comfortable.    Labs and Fingersticks  CAPILLARY BLOOD GLUCOSE      POCT Blood Glucose.: 147 mg/dL (30 Dec 2022 17:11)  POCT Blood Glucose.: 120 mg/dL (30 Dec 2022 12:46)  POCT Blood Glucose.: 88 mg/dL (30 Dec 2022 08:48)  POCT Blood Glucose.: 160 mg/dL (30 Dec 2022 00:28)  POCT Blood Glucose.: 88 mg/dL (29 Dec 2022 22:45)  POCT Blood Glucose.: 83 mg/dL (29 Dec 2022 21:02)  POCT Blood Glucose.: 144 mg/dL (29 Dec 2022 17:39)          Vitamin D, 25-Hydroxy: 36.6 (12-29 @ 06:43)              TPro  6.4  /  Alb  x   /  TBili  x   /  DBili  x   /  AST  x   /  ALT  x   /  AlkPhos  x   12-29                        7.8    10.64 )-----------( 407      ( 29 Dec 2022 06:40 )             25.2     Medications  MEDICATIONS  (STANDING):  allopurinol 100 milliGRAM(s) Oral daily  aspirin  chewable 81 milliGRAM(s) Oral daily  atorvastatin 80 milliGRAM(s) Oral at bedtime  carvedilol 12.5 milliGRAM(s) Oral every 12 hours  chlorhexidine 4% Liquid 1 Application(s) Topical daily  clopidogrel Tablet 75 milliGRAM(s) Oral daily  famotidine    Tablet 10 milliGRAM(s) Oral daily  gabapentin 200 milliGRAM(s) Oral two times a day  heparin   Injectable 5000 Unit(s) SubCutaneous every 8 hours  influenza   Vaccine 0.5 milliLiter(s) IntraMuscular once  insulin glargine Injectable (LANTUS) 17 Unit(s) SubCutaneous at bedtime  insulin lispro (ADMELOG) corrective regimen sliding scale   SubCutaneous Before meals and at bedtime  lidocaine/prilocaine Cream 1 Application(s) Topical <User Schedule>  polyethylene glycol 3350 17 Gram(s) Oral two times a day  senna 2 Tablet(s) Oral at bedtime  tamsulosin 0.4 milliGRAM(s) Oral at bedtime  valsartan 40 milliGRAM(s) Oral daily      Physical Exam  General: Patient appears comfortable.  Vital Signs Last 12 Hrs  T(F): 99 (12-30-22 @ 11:48), Max: 99 (12-30-22 @ 11:48)  HR: 83 (12-30-22 @ 11:49) (75 - 83)  BP: 125/79 (12-30-22 @ 11:48) (125/79 - 125/79)  BP(mean): --  RR: 18 (12-30-22 @ 11:48) (18 - 18)  SpO2: 96% (12-30-22 @ 11:49) (96% - 96%)  Neck: No palpable thyroid nodules.  CVS: S1S2, No murmurs  Respiratory: No wheezing, no crepitations  GI: Abdomen soft, non tender.  Musculoskeletal:  edema lower extremities.     Diagnostics

## 2022-12-30 NOTE — PROGRESS NOTE ADULT - ASSESSMENT
Renal Attending   Physical Exam:  Lungs:  b/l clear  Heart: Normal S1,S2  Abdomen: soft ,non tender  Extremities : no edema  Patient seen and examined. chart and NP note reviewed .I agreed with plan ans assessment and plan of care as written.          [facility]  Office: [phone]           Vivien Montana  [facility]  Office: [phone]

## 2022-12-30 NOTE — PROGRESS NOTE ADULT - ASSESSMENT
Assessment  DMT2: 62y Male with DM T2 with hyperglycemia admitted with on oral hypoglycemic agents at home, now on basal bolus insulin, blood sugars trending down no  hypoglycemic episodes, not eating meals, no acute events.  SDH: Patient is stable, monitored.  HTN: On antihypertensive medications, monitored, asymptomatic.  CKD: On hemodialysis, labs, chart reviewed.              Rubén Escoto MD  Cell:  [phone]  Office: [phone]
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
## 2022-12-30 NOTE — PROGRESS NOTE ADULT - PROBLEM SELECTOR PROBLEM 2
"Chief Complaint   Patient presents with     Toenail     DFE     Musculoskeletal Problem     [place]       Initial /72 (BP Location: Left arm, Patient Position: Sitting, Cuff Size: Adult Regular)   Pulse 101   Temp 96.8  F (36  C) (Tympanic)   SpO2 98%  Estimated body mass index is 28.35 kg/m  as calculated from the following:    Height as of 7/9/21: 1.575 m (5' 2\").    Weight as of 7/21/21: 70.3 kg (155 lb).  Medication Reconciliation: kalina Waters  "
Head injury, closed

## 2022-12-31 ENCOUNTER — TRANSCRIPTION ENCOUNTER (OUTPATIENT)
Age: 62
End: 2022-12-31

## 2022-12-31 ENCOUNTER — INPATIENT (INPATIENT)
Facility: HOSPITAL | Age: 62
LOS: 26 days | Discharge: NURSING, REHAB OR LONG-TERM CARE | DRG: 949 | End: 2023-01-27
Attending: STUDENT IN AN ORGANIZED HEALTH CARE EDUCATION/TRAINING PROGRAM | Admitting: STUDENT IN AN ORGANIZED HEALTH CARE EDUCATION/TRAINING PROGRAM
Payer: COMMERCIAL

## 2022-12-31 VITALS
DIASTOLIC BLOOD PRESSURE: 74 MMHG | OXYGEN SATURATION: 100 % | HEART RATE: 80 BPM | SYSTOLIC BLOOD PRESSURE: 133 MMHG | TEMPERATURE: 98 F | RESPIRATION RATE: 16 BRPM

## 2022-12-31 VITALS
HEART RATE: 86 BPM | RESPIRATION RATE: 16 BRPM | HEIGHT: 67 IN | OXYGEN SATURATION: 96 % | WEIGHT: 174.17 LBS | SYSTOLIC BLOOD PRESSURE: 124 MMHG | DIASTOLIC BLOOD PRESSURE: 64 MMHG | TEMPERATURE: 99 F

## 2022-12-31 DIAGNOSIS — Z98.89 OTHER SPECIFIED POSTPROCEDURAL STATES: Chronic | ICD-10-CM

## 2022-12-31 DIAGNOSIS — Z95.5 PRESENCE OF CORONARY ANGIOPLASTY IMPLANT AND GRAFT: Chronic | ICD-10-CM

## 2022-12-31 DIAGNOSIS — I50.9 HEART FAILURE, UNSPECIFIED: Chronic | ICD-10-CM

## 2022-12-31 DIAGNOSIS — S06.5X9A TRAUMATIC SUBDURAL HEMORRHAGE WITH LOSS OF CONSCIOUSNESS OF UNSPECIFIED DURATION, INITIAL ENCOUNTER: ICD-10-CM

## 2022-12-31 DIAGNOSIS — E11.319 TYPE 2 DIABETES MELLITUS WITH UNSPECIFIED DIABETIC RETINOPATHY WITHOUT MACULAR EDEMA: Chronic | ICD-10-CM

## 2022-12-31 LAB
APPEARANCE UR: CLEAR — SIGNIFICANT CHANGE UP
BILIRUB UR-MCNC: ABNORMAL
COLOR SPEC: ABNORMAL
DIFF PNL FLD: ABNORMAL
GLUCOSE BLDC GLUCOMTR-MCNC: 160 MG/DL — HIGH (ref 70–99)
GLUCOSE BLDC GLUCOMTR-MCNC: 164 MG/DL — HIGH (ref 70–99)
GLUCOSE BLDC GLUCOMTR-MCNC: 83 MG/DL — SIGNIFICANT CHANGE UP (ref 70–99)
GLUCOSE UR QL: NEGATIVE — SIGNIFICANT CHANGE UP
KETONES UR-MCNC: ABNORMAL
LEUKOCYTE ESTERASE UR-ACNC: NEGATIVE — SIGNIFICANT CHANGE UP
NITRITE UR-MCNC: NEGATIVE — SIGNIFICANT CHANGE UP
PH UR: 5 — SIGNIFICANT CHANGE UP (ref 5–8)
PROT UR-MCNC: 100
SP GR SPEC: 1.02 — SIGNIFICANT CHANGE UP (ref 1.01–1.02)
UROBILINOGEN FLD QL: NEGATIVE — SIGNIFICANT CHANGE UP
VIT B1 SERPL-MCNC: 89.3 NMOL/L — SIGNIFICANT CHANGE UP (ref 66.5–200)

## 2022-12-31 PROCEDURE — 71045 X-RAY EXAM CHEST 1 VIEW: CPT

## 2022-12-31 PROCEDURE — 87040 BLOOD CULTURE FOR BACTERIA: CPT

## 2022-12-31 PROCEDURE — 70450 CT HEAD/BRAIN W/O DYE: CPT

## 2022-12-31 PROCEDURE — 76377 3D RENDER W/INTRP POSTPROCES: CPT

## 2022-12-31 PROCEDURE — 97110 THERAPEUTIC EXERCISES: CPT

## 2022-12-31 PROCEDURE — 99231 SBSQ HOSP IP/OBS SF/LOW 25: CPT | Mod: GC

## 2022-12-31 PROCEDURE — 82607 VITAMIN B-12: CPT

## 2022-12-31 PROCEDURE — 80048 BASIC METABOLIC PNL TOTAL CA: CPT

## 2022-12-31 PROCEDURE — 82525 ASSAY OF COPPER: CPT

## 2022-12-31 PROCEDURE — 84550 ASSAY OF BLOOD/URIC ACID: CPT

## 2022-12-31 PROCEDURE — 84100 ASSAY OF PHOSPHORUS: CPT

## 2022-12-31 PROCEDURE — 99285 EMERGENCY DEPT VISIT HI MDM: CPT

## 2022-12-31 PROCEDURE — 73030 X-RAY EXAM OF SHOULDER: CPT

## 2022-12-31 PROCEDURE — 76498 UNLISTED MR PROCEDURE: CPT

## 2022-12-31 PROCEDURE — 82140 ASSAY OF AMMONIA: CPT

## 2022-12-31 PROCEDURE — 82550 ASSAY OF CK (CPK): CPT

## 2022-12-31 PROCEDURE — 84443 ASSAY THYROID STIM HORMONE: CPT

## 2022-12-31 PROCEDURE — 84436 ASSAY OF TOTAL THYROXINE: CPT

## 2022-12-31 PROCEDURE — 85576 BLOOD PLATELET AGGREGATION: CPT

## 2022-12-31 PROCEDURE — 86334 IMMUNOFIX E-PHORESIS SERUM: CPT

## 2022-12-31 PROCEDURE — 72170 X-RAY EXAM OF PELVIS: CPT

## 2022-12-31 PROCEDURE — 73721 MRI JNT OF LWR EXTRE W/O DYE: CPT

## 2022-12-31 PROCEDURE — 86900 BLOOD TYPING SEROLOGIC ABO: CPT

## 2022-12-31 PROCEDURE — 0225U NFCT DS DNA&RNA 21 SARSCOV2: CPT

## 2022-12-31 PROCEDURE — 84155 ASSAY OF PROTEIN SERUM: CPT

## 2022-12-31 PROCEDURE — 87389 HIV-1 AG W/HIV-1&-2 AB AG IA: CPT

## 2022-12-31 PROCEDURE — 82306 VITAMIN D 25 HYDROXY: CPT

## 2022-12-31 PROCEDURE — 82533 TOTAL CORTISOL: CPT

## 2022-12-31 PROCEDURE — 93970 EXTREMITY STUDY: CPT

## 2022-12-31 PROCEDURE — 97530 THERAPEUTIC ACTIVITIES: CPT

## 2022-12-31 PROCEDURE — 85652 RBC SED RATE AUTOMATED: CPT

## 2022-12-31 PROCEDURE — 92610 EVALUATE SWALLOWING FUNCTION: CPT

## 2022-12-31 PROCEDURE — 85730 THROMBOPLASTIN TIME PARTIAL: CPT

## 2022-12-31 PROCEDURE — 86140 C-REACTIVE PROTEIN: CPT

## 2022-12-31 PROCEDURE — 97166 OT EVAL MOD COMPLEX 45 MIN: CPT

## 2022-12-31 PROCEDURE — 73562 X-RAY EXAM OF KNEE 3: CPT

## 2022-12-31 PROCEDURE — 82962 GLUCOSE BLOOD TEST: CPT

## 2022-12-31 PROCEDURE — 71250 CT THORAX DX C-: CPT

## 2022-12-31 PROCEDURE — 84630 ASSAY OF ZINC: CPT

## 2022-12-31 PROCEDURE — 85025 COMPLETE CBC W/AUTO DIFF WBC: CPT

## 2022-12-31 PROCEDURE — 73700 CT LOWER EXTREMITY W/O DYE: CPT | Mod: MA

## 2022-12-31 PROCEDURE — 97162 PT EVAL MOD COMPLEX 30 MIN: CPT

## 2022-12-31 PROCEDURE — 85610 PROTHROMBIN TIME: CPT

## 2022-12-31 PROCEDURE — 93971 EXTREMITY STUDY: CPT

## 2022-12-31 PROCEDURE — 86901 BLOOD TYPING SEROLOGIC RH(D): CPT

## 2022-12-31 PROCEDURE — 95812 EEG 41-60 MINUTES: CPT

## 2022-12-31 PROCEDURE — 87340 HEPATITIS B SURFACE AG IA: CPT

## 2022-12-31 PROCEDURE — 84207 ASSAY OF VITAMIN B-6: CPT

## 2022-12-31 PROCEDURE — 97168 OT RE-EVAL EST PLAN CARE: CPT

## 2022-12-31 PROCEDURE — 84425 ASSAY OF VITAMIN B-1: CPT

## 2022-12-31 PROCEDURE — 83735 ASSAY OF MAGNESIUM: CPT

## 2022-12-31 PROCEDURE — 72125 CT NECK SPINE W/O DYE: CPT

## 2022-12-31 PROCEDURE — 85027 COMPLETE CBC AUTOMATED: CPT

## 2022-12-31 PROCEDURE — 84145 PROCALCITONIN (PCT): CPT

## 2022-12-31 PROCEDURE — 83090 ASSAY OF HOMOCYSTEINE: CPT

## 2022-12-31 PROCEDURE — 99261: CPT

## 2022-12-31 PROCEDURE — 80053 COMPREHEN METABOLIC PANEL: CPT

## 2022-12-31 PROCEDURE — 83605 ASSAY OF LACTIC ACID: CPT

## 2022-12-31 PROCEDURE — 82746 ASSAY OF FOLIC ACID SERUM: CPT

## 2022-12-31 PROCEDURE — 83921 ORGANIC ACID SINGLE QUANT: CPT

## 2022-12-31 PROCEDURE — 84484 ASSAY OF TROPONIN QUANT: CPT

## 2022-12-31 PROCEDURE — 74176 CT ABD & PELVIS W/O CONTRAST: CPT

## 2022-12-31 PROCEDURE — 36415 COLL VENOUS BLD VENIPUNCTURE: CPT

## 2022-12-31 PROCEDURE — 86850 RBC ANTIBODY SCREEN: CPT

## 2022-12-31 PROCEDURE — U0005: CPT

## 2022-12-31 PROCEDURE — 86706 HEP B SURFACE ANTIBODY: CPT

## 2022-12-31 PROCEDURE — 87635 SARS-COV-2 COVID-19 AMP PRB: CPT

## 2022-12-31 PROCEDURE — 93005 ELECTROCARDIOGRAM TRACING: CPT

## 2022-12-31 PROCEDURE — 81001 URINALYSIS AUTO W/SCOPE: CPT

## 2022-12-31 PROCEDURE — U0003: CPT

## 2022-12-31 PROCEDURE — 84480 ASSAY TRIIODOTHYRONINE (T3): CPT

## 2022-12-31 PROCEDURE — 84165 PROTEIN E-PHORESIS SERUM: CPT

## 2022-12-31 RX ORDER — CARVEDILOL PHOSPHATE 80 MG/1
1 CAPSULE, EXTENDED RELEASE ORAL
Qty: 0 | Refills: 0 | DISCHARGE
Start: 2022-12-31

## 2022-12-31 RX ORDER — INSULIN LISPRO 100/ML
VIAL (ML) SUBCUTANEOUS
Refills: 0 | Status: DISCONTINUED | OUTPATIENT
Start: 2022-12-31 | End: 2023-01-04

## 2022-12-31 RX ORDER — LIDOCAINE AND PRILOCAINE CREAM 25; 25 MG/G; MG/G
1 CREAM TOPICAL
Qty: 0 | Refills: 0 | DISCHARGE
Start: 2022-12-31

## 2022-12-31 RX ORDER — ASPIRIN/CALCIUM CARB/MAGNESIUM 324 MG
1 TABLET ORAL
Qty: 0 | Refills: 0 | DISCHARGE

## 2022-12-31 RX ORDER — TAMSULOSIN HYDROCHLORIDE 0.4 MG/1
0.4 CAPSULE ORAL AT BEDTIME
Refills: 0 | Status: DISCONTINUED | OUTPATIENT
Start: 2022-12-31 | End: 2023-01-27

## 2022-12-31 RX ORDER — DEXTROSE 50 % IN WATER 50 %
25 SYRINGE (ML) INTRAVENOUS ONCE
Refills: 0 | Status: DISCONTINUED | OUTPATIENT
Start: 2022-12-31 | End: 2023-01-27

## 2022-12-31 RX ORDER — ALLOPURINOL 300 MG
100 TABLET ORAL
Refills: 0 | Status: DISCONTINUED | OUTPATIENT
Start: 2023-01-01 | End: 2023-01-27

## 2022-12-31 RX ORDER — ALLOPURINOL 300 MG
1 TABLET ORAL
Qty: 0 | Refills: 0 | DISCHARGE
Start: 2022-12-31

## 2022-12-31 RX ORDER — INSULIN ASPART 100 [IU]/ML
8 INJECTION, SOLUTION SUBCUTANEOUS
Qty: 0 | Refills: 0 | DISCHARGE

## 2022-12-31 RX ORDER — SENNA PLUS 8.6 MG/1
2 TABLET ORAL
Qty: 0 | Refills: 0 | DISCHARGE
Start: 2022-12-31

## 2022-12-31 RX ORDER — ZINC OXIDE 200 MG/G
1 OINTMENT TOPICAL DAILY
Refills: 0 | Status: DISCONTINUED | OUTPATIENT
Start: 2022-12-31 | End: 2023-01-27

## 2022-12-31 RX ORDER — FAMOTIDINE 10 MG/ML
10 INJECTION INTRAVENOUS DAILY
Refills: 0 | Status: DISCONTINUED | OUTPATIENT
Start: 2023-01-01 | End: 2023-01-27

## 2022-12-31 RX ORDER — ALLOPURINOL 300 MG
1 TABLET ORAL
Qty: 0 | Refills: 0 | DISCHARGE

## 2022-12-31 RX ORDER — CLOPIDOGREL BISULFATE 75 MG/1
75 TABLET, FILM COATED ORAL DAILY
Refills: 0 | Status: DISCONTINUED | OUTPATIENT
Start: 2023-01-01 | End: 2023-01-27

## 2022-12-31 RX ORDER — INSULIN GLARGINE 100 [IU]/ML
12 INJECTION, SOLUTION SUBCUTANEOUS
Qty: 0 | Refills: 0 | DISCHARGE
Start: 2022-12-31

## 2022-12-31 RX ORDER — ATORVASTATIN CALCIUM 80 MG/1
80 TABLET, FILM COATED ORAL AT BEDTIME
Refills: 0 | Status: DISCONTINUED | OUTPATIENT
Start: 2022-12-31 | End: 2023-01-27

## 2022-12-31 RX ORDER — LANOLIN ALCOHOL/MO/W.PET/CERES
3 CREAM (GRAM) TOPICAL AT BEDTIME
Refills: 0 | Status: DISCONTINUED | OUTPATIENT
Start: 2022-12-31 | End: 2023-01-21

## 2022-12-31 RX ORDER — HEPARIN SODIUM 5000 [USP'U]/ML
5000 INJECTION INTRAVENOUS; SUBCUTANEOUS EVERY 8 HOURS
Refills: 0 | Status: DISCONTINUED | OUTPATIENT
Start: 2022-12-31 | End: 2023-01-27

## 2022-12-31 RX ORDER — FAMOTIDINE 10 MG/ML
1 INJECTION INTRAVENOUS
Qty: 0 | Refills: 0 | DISCHARGE
Start: 2022-12-31

## 2022-12-31 RX ORDER — SODIUM CHLORIDE 9 MG/ML
1000 INJECTION, SOLUTION INTRAVENOUS
Refills: 0 | Status: DISCONTINUED | OUTPATIENT
Start: 2022-12-31 | End: 2023-01-27

## 2022-12-31 RX ORDER — ACETAMINOPHEN 500 MG
650 TABLET ORAL EVERY 6 HOURS
Refills: 0 | Status: DISCONTINUED | OUTPATIENT
Start: 2022-12-31 | End: 2023-01-05

## 2022-12-31 RX ORDER — ATORVASTATIN CALCIUM 80 MG/1
1 TABLET, FILM COATED ORAL
Qty: 0 | Refills: 0 | DISCHARGE
Start: 2022-12-31

## 2022-12-31 RX ORDER — SENNA PLUS 8.6 MG/1
2 TABLET ORAL AT BEDTIME
Refills: 0 | Status: DISCONTINUED | OUTPATIENT
Start: 2022-12-31 | End: 2023-01-27

## 2022-12-31 RX ORDER — INSULIN GLARGINE 100 [IU]/ML
10 INJECTION, SOLUTION SUBCUTANEOUS
Qty: 0 | Refills: 0 | DISCHARGE

## 2022-12-31 RX ORDER — INSULIN LISPRO 100/ML
0 VIAL (ML) SUBCUTANEOUS
Qty: 0 | Refills: 0 | DISCHARGE
Start: 2022-12-31

## 2022-12-31 RX ORDER — CLOPIDOGREL BISULFATE 75 MG/1
1 TABLET, FILM COATED ORAL
Qty: 0 | Refills: 0 | DISCHARGE
Start: 2022-12-31

## 2022-12-31 RX ORDER — GABAPENTIN 400 MG/1
100 CAPSULE ORAL
Refills: 0 | Status: DISCONTINUED | OUTPATIENT
Start: 2022-12-31 | End: 2023-01-04

## 2022-12-31 RX ORDER — INSULIN GLARGINE 100 [IU]/ML
12 INJECTION, SOLUTION SUBCUTANEOUS AT BEDTIME
Refills: 0 | Status: DISCONTINUED | OUTPATIENT
Start: 2022-12-31 | End: 2023-01-09

## 2022-12-31 RX ORDER — CARVEDILOL PHOSPHATE 80 MG/1
12.5 CAPSULE, EXTENDED RELEASE ORAL
Refills: 0 | Status: DISCONTINUED | OUTPATIENT
Start: 2022-12-31 | End: 2023-01-05

## 2022-12-31 RX ORDER — GLUCAGON INJECTION, SOLUTION 0.5 MG/.1ML
1 INJECTION, SOLUTION SUBCUTANEOUS ONCE
Refills: 0 | Status: DISCONTINUED | OUTPATIENT
Start: 2022-12-31 | End: 2023-01-27

## 2022-12-31 RX ORDER — CARVEDILOL PHOSPHATE 80 MG/1
12.5 CAPSULE, EXTENDED RELEASE ORAL
Refills: 0 | Status: DISCONTINUED | OUTPATIENT
Start: 2022-12-31 | End: 2022-12-31

## 2022-12-31 RX ORDER — ASPIRIN/CALCIUM CARB/MAGNESIUM 324 MG
81 TABLET ORAL DAILY
Refills: 0 | Status: DISCONTINUED | OUTPATIENT
Start: 2023-01-01 | End: 2023-01-27

## 2022-12-31 RX ORDER — DEXTROSE 50 % IN WATER 50 %
15 SYRINGE (ML) INTRAVENOUS ONCE
Refills: 0 | Status: DISCONTINUED | OUTPATIENT
Start: 2022-12-31 | End: 2023-01-27

## 2022-12-31 RX ORDER — DEXTROSE 50 % IN WATER 50 %
12.5 SYRINGE (ML) INTRAVENOUS ONCE
Refills: 0 | Status: DISCONTINUED | OUTPATIENT
Start: 2022-12-31 | End: 2023-01-27

## 2022-12-31 RX ORDER — HEPARIN SODIUM 5000 [USP'U]/ML
5000 INJECTION INTRAVENOUS; SUBCUTANEOUS
Qty: 0 | Refills: 0 | DISCHARGE
Start: 2022-12-31

## 2022-12-31 RX ORDER — VALSARTAN 80 MG/1
1 TABLET ORAL
Qty: 0 | Refills: 0 | DISCHARGE
Start: 2022-12-31

## 2022-12-31 RX ORDER — ASPIRIN/CALCIUM CARB/MAGNESIUM 324 MG
1 TABLET ORAL
Qty: 0 | Refills: 0 | DISCHARGE
Start: 2022-12-31

## 2022-12-31 RX ORDER — POLYETHYLENE GLYCOL 3350 17 G/17G
17 POWDER, FOR SOLUTION ORAL
Refills: 0 | Status: DISCONTINUED | OUTPATIENT
Start: 2022-12-31 | End: 2023-01-27

## 2022-12-31 RX ORDER — GABAPENTIN 400 MG/1
2 CAPSULE ORAL
Qty: 0 | Refills: 0 | DISCHARGE
Start: 2022-12-31

## 2022-12-31 RX ORDER — INSULIN GLARGINE 100 [IU]/ML
12 INJECTION, SOLUTION SUBCUTANEOUS AT BEDTIME
Refills: 0 | Status: DISCONTINUED | OUTPATIENT
Start: 2022-12-31 | End: 2022-12-31

## 2022-12-31 RX ORDER — ACETAMINOPHEN 500 MG
650 TABLET ORAL ONCE
Refills: 0 | Status: COMPLETED | OUTPATIENT
Start: 2022-12-31 | End: 2022-12-31

## 2022-12-31 RX ORDER — TAMSULOSIN HYDROCHLORIDE 0.4 MG/1
1 CAPSULE ORAL
Qty: 0 | Refills: 0 | DISCHARGE
Start: 2022-12-31

## 2022-12-31 RX ORDER — VALSARTAN 80 MG/1
40 TABLET ORAL DAILY
Refills: 0 | Status: DISCONTINUED | OUTPATIENT
Start: 2023-01-01 | End: 2023-01-03

## 2022-12-31 RX ORDER — POLYETHYLENE GLYCOL 3350 17 G/17G
17 POWDER, FOR SOLUTION ORAL
Qty: 0 | Refills: 0 | DISCHARGE
Start: 2022-12-31

## 2022-12-31 RX ADMIN — CARVEDILOL PHOSPHATE 12.5 MILLIGRAM(S): 80 CAPSULE, EXTENDED RELEASE ORAL at 20:23

## 2022-12-31 RX ADMIN — Medication 650 MILLIGRAM(S): at 17:10

## 2022-12-31 RX ADMIN — Medication 650 MILLIGRAM(S): at 18:40

## 2022-12-31 RX ADMIN — VALSARTAN 40 MILLIGRAM(S): 80 TABLET ORAL at 12:08

## 2022-12-31 RX ADMIN — Medication 100 MILLIGRAM(S): at 11:07

## 2022-12-31 RX ADMIN — CHLORHEXIDINE GLUCONATE 1 APPLICATION(S): 213 SOLUTION TOPICAL at 11:30

## 2022-12-31 RX ADMIN — SENNA PLUS 2 TABLET(S): 8.6 TABLET ORAL at 21:41

## 2022-12-31 RX ADMIN — GABAPENTIN 100 MILLIGRAM(S): 400 CAPSULE ORAL at 17:09

## 2022-12-31 RX ADMIN — HEPARIN SODIUM 5000 UNIT(S): 5000 INJECTION INTRAVENOUS; SUBCUTANEOUS at 21:41

## 2022-12-31 RX ADMIN — Medication 81 MILLIGRAM(S): at 10:23

## 2022-12-31 RX ADMIN — Medication 650 MILLIGRAM(S): at 20:23

## 2022-12-31 RX ADMIN — HEPARIN SODIUM 5000 UNIT(S): 5000 INJECTION INTRAVENOUS; SUBCUTANEOUS at 12:08

## 2022-12-31 RX ADMIN — TAMSULOSIN HYDROCHLORIDE 0.4 MILLIGRAM(S): 0.4 CAPSULE ORAL at 21:41

## 2022-12-31 RX ADMIN — CLOPIDOGREL BISULFATE 75 MILLIGRAM(S): 75 TABLET, FILM COATED ORAL at 11:08

## 2022-12-31 RX ADMIN — ZINC OXIDE 1 APPLICATION(S): 200 OINTMENT TOPICAL at 15:54

## 2022-12-31 RX ADMIN — POLYETHYLENE GLYCOL 3350 17 GRAM(S): 17 POWDER, FOR SOLUTION ORAL at 17:09

## 2022-12-31 RX ADMIN — ATORVASTATIN CALCIUM 80 MILLIGRAM(S): 80 TABLET, FILM COATED ORAL at 21:41

## 2022-12-31 RX ADMIN — INSULIN GLARGINE 12 UNIT(S): 100 INJECTION, SOLUTION SUBCUTANEOUS at 21:41

## 2022-12-31 RX ADMIN — Medication 2: at 15:56

## 2022-12-31 RX ADMIN — CARVEDILOL PHOSPHATE 12.5 MILLIGRAM(S): 80 CAPSULE, EXTENDED RELEASE ORAL at 11:07

## 2022-12-31 RX ADMIN — Medication 650 MILLIGRAM(S): at 22:00

## 2022-12-31 RX ADMIN — FAMOTIDINE 10 MILLIGRAM(S): 10 INJECTION INTRAVENOUS at 11:08

## 2022-12-31 NOTE — PROGRESS NOTE ADULT - PROVIDER SPECIALTY LIST ADULT
Cardiology
Endocrinology
Internal Medicine
NSICU
Nephrology
Rheumatology
Cardiology
Endocrinology
Internal Medicine
NSICU
NSICU
Nephrology
Neurology
Trauma Surgery
Cardiology
Endocrinology
Internal Medicine
Internal Medicine
NSICU
Nephrology
Neurology
Neurology
Internal Medicine
NSICU
Neurosurgery
NSICU
Endocrinology

## 2022-12-31 NOTE — DISCHARGE NOTE PROVIDER - PROVIDER TOKENS
PROVIDER:[TOKEN:[[id number]],FOLLOWUP:[2 weeks]],PROVIDER:[TOKEN:[[id number]],FOLLOWUP:[1 week]]

## 2022-12-31 NOTE — DISCHARGE NOTE NURSING/CASE MANAGEMENT/SOCIAL WORK - PATIENT PORTAL LINK FT
You can access the FollowMyHealth Patient Portal offered by [facility] by registering at the following website: [email or web address]. By joining [employer]’s FollowMyHealth portal, you will also be able to view your health information using other applications (apps) compatible with our system.

## 2022-12-31 NOTE — PROGRESS NOTE ADULT - NUTRITIONAL ASSESSMENT
This patient has been assessed with a concern for Malnutrition and has been determined to have a diagnosis/diagnoses of Mild protein-calorie malnutrition.    This patient is being managed with:   Diet Minced and Moist-  Consistent Carbohydrate {No Snacks} (CSTCHO)  Supplement Feeding Modality:  Oral  Nepro Cans or Servings Per Day:  1       Frequency:  Daily  Entered: Dec 25 2022  3:37PM    

## 2022-12-31 NOTE — PROGRESS NOTE ADULT - SUBJECTIVE AND OBJECTIVE BOX
afebrile    REVIEW OF SYSTEMS:  CONSTITUTIONAL: No fever,  no  weight loss  ENT:  No  tinnitus,   no   vertigo  NECK: No pain or stiffness  RESPIRATORY: No cough, wheezing, chills or hemoptysis;    No Shortness of Breath  CARDIOVASCULAR: No chest pain, palpitations, dizziness  GASTROINTESTINAL: No abdominal or epigastric pain. No nausea, vomiting, or hematemesis; No diarrhea  No melena or hematochezia.  GENITOURINARY: No dysuria, frequency, hematuria, or incontinence  NEUROLOGICAL: No headaches  SKIN: No itching,  no   rash  LYMPH Nodes: No enlarged glands  ENDOCRINE: No heat or cold intolerance  MUSCULOSKELETAL: No joint pain or swelling  PSYCHIATRIC: No depression, anxiety  HEME/LYMPH: No easy bruising, or bleeding gums  ALLERGY AND IMMUNOLOGIC: No hives or eczema	    MEDICATIONS  (STANDING):  allopurinol 100 milliGRAM(s) Oral daily  aspirin  chewable 81 milliGRAM(s) Oral daily  atorvastatin 80 milliGRAM(s) Oral at bedtime  carvedilol 12.5 milliGRAM(s) Oral every 12 hours  chlorhexidine 4% Liquid 1 Application(s) Topical daily  clopidogrel Tablet 75 milliGRAM(s) Oral daily  famotidine    Tablet 10 milliGRAM(s) Oral daily  gabapentin 200 milliGRAM(s) Oral two times a day  heparin   Injectable 5000 Unit(s) SubCutaneous every 8 hours  influenza   Vaccine 0.5 milliLiter(s) IntraMuscular once  insulin glargine Injectable (LANTUS) 17 Unit(s) SubCutaneous at bedtime  insulin lispro (ADMELOG) corrective regimen sliding scale   SubCutaneous Before meals and at bedtime  lidocaine/prilocaine Cream 1 Application(s) Topical <User Schedule>  polyethylene glycol 3350 17 Gram(s) Oral two times a day  senna 2 Tablet(s) Oral at bedtime  tamsulosin 0.4 milliGRAM(s) Oral at bedtime  valsartan 40 milliGRAM(s) Oral daily    MEDICATIONS  (PRN):  acetaminophen     Tablet .. 650 milliGRAM(s) Oral every 6 hours PRN Temp greater or equal to 38C (100.4F), Mild Pain (1 - 3)      Vital Signs Last 24 Hrs  T(C): 37.5 (31 Dec 2022 04:37), Max: 37.5 (31 Dec 2022 04:37)  T(F): 99.5 (31 Dec 2022 04:37), Max: 99.5 (31 Dec 2022 04:37)  HR: 81 (31 Dec 2022 04:37) (75 - 87)  BP: 140/82 (31 Dec 2022 04:37) (125/79 - 160/84)  BP(mean): --  RR: 18 (31 Dec 2022 04:37) (18 - 18)  SpO2: 95% (31 Dec 2022 04:37) (95% - 98%)    Parameters below as of 31 Dec 2022 04:37  Patient On (Oxygen Delivery Method): room air      CAPILLARY BLOOD GLUCOSE      POCT Blood Glucose.: 180 mg/dL (30 Dec 2022 21:13)  POCT Blood Glucose.: 147 mg/dL (30 Dec 2022 17:11)  POCT Blood Glucose.: 120 mg/dL (30 Dec 2022 12:46)  POCT Blood Glucose.: 88 mg/dL (30 Dec 2022 08:48)    I&O's Summary    29 Dec 2022 07:01  -  30 Dec 2022 07:00  --------------------------------------------------------  IN: 660 mL / OUT: 0 mL / NET: 660 mL    30 Dec 2022 07:01  -  31 Dec 2022 05:38  --------------------------------------------------------  IN: 480 mL / OUT: 0 mL / NET: 480 mL          Appearance: Normal	  HEENT:   Normal oral mucosa, PERRL, EOMI	  Lymphatic: No lymphadenopathy  Cardiovascular: Normal S1 S2, No JVD  Respiratory: Lungs clear to auscultation	  Psychiatry: A & O x 3, Mood & affect appropriate  Gastrointestinal:  Soft, Non-tender, + BS	  Skin: No rash, No ecchymoses	  r  weakness,  mild  Vascular: Peripheral pulses palpable bilaterally    LABS:                        7.8    10.64 )-----------( 407      ( 29 Dec 2022 06:40 )             25.2         TPro  6.4  /  Alb  x   /  TBili  x   /  DBili  x   /  AST  x   /  ALT  x   /  AlkPhos  x   12-29      CARDIAC MARKERS ( 29 Dec 2022 06:45 )  x     / x     / 71 U/L / x     / x            Lactate, Blood: 0.9 mmol/L (12-29 @ 06:45)          Thyroid Stimulating Hormone, Serum: 2.03 uIU/mL (12-29 @ 06:43)          Consultant(s) Notes Reviewed:      Care Discussed with Consultants/Other Providers:

## 2022-12-31 NOTE — H&P ADULT - NSHPSOCIALHISTORY_GEN_ALL_CORE
ETOH - denies  Tobacco - denies  Lives in home with 3STE and 13 stairs to bathroom and bedroom. Lives with Spouse Lucía. [phone].    Daughter Darnell [phone]  Family will be out of the country Jan 5-Feb 10, available by phone    Previously independent with ADLs w Cane or walker. Unable to properly dose own insulin/med regimen while wife away.     Fn Status:  Transfer Training Sit-to-Stand Transfer: unable to perform;  2 person assist;  verbal cues;  nonverbal cues (demo/gestures);  full weight-bearing   @ people at pt sides and blocking pt knees; pt unable to participate enough to clear buttocks.  Bed Mobility Training Rolling/Turning: maximum assist (25% patient effort);  2 person assist;  verbal cues;  nonverbal cues (demo/gestures)
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
## 2022-12-31 NOTE — DISCHARGE NOTE PROVIDER - NSDCCPCAREPLAN_GEN_ALL_CORE_FT
PRINCIPAL DISCHARGE DIAGNOSIS  Diagnosis: SDH (subdural hematoma)  Assessment and Plan of Treatment: CT head remains stable.  Follow up with Dr. Baca (Neurology) within 1-2 weeks of discharge.  Follow up with your PMD within one week of discharge.      SECONDARY DISCHARGE DIAGNOSES  Diagnosis: DM (diabetes mellitus)  Assessment and Plan of Treatment: HgA1C this admission = 11.8.  Make sure you get your HgA1c checked every three months.  If you take oral diabetes medications, check your blood glucose two times a day.  If you take insulin, check your blood glucose before meals and at bedtime.  It's important not to skip any meals.  Keep a log of your blood glucose results and always take it with you to your doctor appointments.  Keep a list of your current medications including injectables and over the counter medications and bring this medication list with you to all your doctor appointments.  If you have not seen your opthalmologist this year call for appointment.  Check your feet daily for redness, sores, or openings. Do not self treat. If no improvement in two days call your primary care physician for an appointment.  Low blood sugar (hypoglycemia) is a blood sugar below 70mg/dl. Check your blood sugar if you feel signs/symptoms of hypoglycemia. If your blood sugar is below 70 take 15 grams of carbohydrates (ex 4 oz of apple juice, 3-4 glucosr tablets, or 4-6 oz of regular soda) wait 15 minutes and repeat blood sugar to make sure it comes up above 70.  If your blood sugar is above 70 and you are due for a meal, have a meal.  If you are not due for a meal have a snack.  This snack helps keeps your blood sugar at a safe range.    Diagnosis: HTN (hypertension)  Assessment and Plan of Treatment: Continue to take your medications as prescribed.    Diagnosis: CAD (coronary atherosclerotic disease)  Assessment and Plan of Treatment: Continue with aspirin, plavix and atorvastatin as prescribed.    Diagnosis: ESRD on dialysis  Assessment and Plan of Treatment: Continue with Monday, Wednesday, Friday schedule.  Monitor BMP

## 2022-12-31 NOTE — H&P ADULT - ATTENDING COMMENTS
Patient seen and examined this AM.     Reports he is very fatigued as he was bothered too much overnight. Labs reviewed. On HD, will need adjustments as per nephrology.     Begin ACUTE inpatient rehabilitation for the functional deficits consisting of 3 hours of therapy/day & 24 hour RN/daily PMR physician for comorbid medical management.

## 2022-12-31 NOTE — DISCHARGE NOTE PROVIDER - NSDCMRMEDTOKEN_GEN_ALL_CORE_FT
allopurinol 100 mg oral tablet: 1 tab(s) orally once a day  aspirin 81 mg oral tablet, chewable: 1 tab(s) orally once a day  atorvastatin 80 mg oral tablet: 1 tab(s) orally once a day (at bedtime)  carvedilol 12.5 mg oral tablet: 1 tab(s) orally every 12 hours  clopidogrel 75 mg oral tablet: 1 tab(s) orally once a day  famotidine 10 mg oral tablet: 1 tab(s) orally once a day  gabapentin 100 mg oral capsule: 2 cap(s) orally 2 times a day  heparin: 5000 unit(s) subcutaneous every 8 hours  insulin glargine 100 units/mL subcutaneous solution: 12 unit(s) subcutaneous once a day (at bedtime)  insulin lispro 100 units/mL injectable solution: Insulin sliding scale subcutaneous once a day (at bedtime)    2 Unit(s) if Glucose 151 - 200  4 Unit(s) if Glucose 201 - 250  6 Unit(s) if Glucose 251 - 300  8 Unit(s) if Glucose 301 - 350  10 Unit(s) if Glucose 351 - 400  12 Unit(s) if Glucose Greater Than 400    lidocaine-prilocaine 2.5%-2.5% topical cream: Apply topically to affected area 4 times a week (Monday, Tues, Wed, Friday)  polyethylene glycol 3350 oral powder for reconstitution: 17 gram(s) orally 2 times a day  senna leaf extract oral tablet: 2 tab(s) orally once a day (at bedtime)  tamsulosin 0.4 mg oral capsule: 1 cap(s) orally once a day (at bedtime)  valsartan 40 mg oral tablet: 1 tab(s) orally once a day
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
## 2022-12-31 NOTE — DISCHARGE NOTE PROVIDER - DETAILS OF MALNUTRITION DIAGNOSIS/DIAGNOSES
This patient has been assessed with a concern for Malnutrition and was treated during this hospitalization for the following Nutrition diagnosis/diagnoses:     -  12/25/2022: Mild protein-calorie malnutrition

## 2022-12-31 NOTE — DISCHARGE NOTE NURSING/CASE MANAGEMENT/SOCIAL WORK - NSDCPEFALRISK_GEN_ALL_CORE
For information on Fall & Injury Prevention, visit: [email or web address] OR  [email or web address] OR  https://www.cdc.gov/steadi/patient.html
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
## 2022-12-31 NOTE — DISCHARGE NOTE PROVIDER - CARE PROVIDER_API CALL
Sidney Baca)  Neurology; Neurophysiology  [address]  [place] [address]  Phone: [phone]  Fax: [phone]  Follow Up Time: 2 weeks    Rosaline Israel (DO)  Family Medicine  [address], [address]  [place] [address]  Phone: [phone]  Fax: [phone]  Follow Up Time: 1 week

## 2022-12-31 NOTE — PROGRESS NOTE ADULT - REASON FOR ADMISSION
fall

## 2022-12-31 NOTE — H&P ADULT - NSHPREVIEWOFSYSTEMS_GEN_ALL_CORE
REVIEW OF SYSTEMS  Constitutional: No fever, No Chills   HEENT: +poor vision diabetic retinopathy, + dysphagia  Pulm: No cough,  No shortness of breath  Cardio: No chest pain, No palpitations  GI:  No abdominal pain, No nausea, No vomiting  MSK: + generalized joint pain REVIEW OF SYSTEMS  Constitutional: No fever, No Chills   HEENT: +poor vision diabetic retinopathy   Pulm: No cough,  No shortness of breath  Cardio: No chest pain, No palpitations  GI:  No abdominal pain, No nausea, No vomiting  MSK: + generalized joint pain
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
## 2022-12-31 NOTE — PROGRESS NOTE ADULT - SUBJECTIVE AND OBJECTIVE BOX
MRN-[id number]  Patient is a 62y old  Male who presents with a chief complaint of fall (31 Dec 2022 09:12)    Subjective: Weakness slightly improving. However, pain in legs.     Objective:    Home Medications:  allopurinol 100 mg oral tablet: 1 tab(s) orally once a day (31 Dec 2022 10:10)  aspirin 81 mg oral tablet, chewable: 1 tab(s) orally once a day (31 Dec 2022 10:10)  atorvastatin 80 mg oral tablet: 1 tab(s) orally once a day (at bedtime) (31 Dec 2022 10:10)  carvedilol 12.5 mg oral tablet: 1 tab(s) orally every 12 hours (31 Dec 2022 10:10)  clopidogrel 75 mg oral tablet: 1 tab(s) orally once a day (31 Dec 2022 10:10)  famotidine 10 mg oral tablet: 1 tab(s) orally once a day (31 Dec 2022 10:10)  gabapentin 100 mg oral capsule: 2 cap(s) orally 2 times a day (31 Dec 2022 10:10)  heparin: 5000 unit(s) subcutaneous every 8 hours (31 Dec 2022 10:10)  insulin glargine 100 units/mL subcutaneous solution: 12 unit(s) subcutaneous once a day (at bedtime) (31 Dec 2022 10:10)  insulin lispro 100 units/mL injectable solution: Insulin sliding scale subcutaneous once a day (at bedtime)    2 Unit(s) if Glucose 151 - 200  4 Unit(s) if Glucose 201 - 250  6 Unit(s) if Glucose 251 - 300  8 Unit(s) if Glucose 301 - 350  10 Unit(s) if Glucose 351 - 400  12 Unit(s) if Glucose Greater Than 400   (31 Dec 2022 10:10)  lidocaine-prilocaine 2.5%-2.5% topical cream: Apply topically to affected area 4 times a week (Monday, Tues, Wed, Friday) (31 Dec 2022 10:10)  polyethylene glycol 3350 oral powder for reconstitution: 17 gram(s) orally 2 times a day (31 Dec 2022 10:10)  senna leaf extract oral tablet: 2 tab(s) orally once a day (at bedtime) (31 Dec 2022 10:10)  tamsulosin 0.4 mg oral capsule: 1 cap(s) orally once a day (at bedtime) (31 Dec 2022 10:10)  valsartan 40 mg oral tablet: 1 tab(s) orally once a day (31 Dec 2022 10:10)    MEDICATIONS  (STANDING):  allopurinol 100 milliGRAM(s) Oral daily  aspirin  chewable 81 milliGRAM(s) Oral daily  atorvastatin 80 milliGRAM(s) Oral at bedtime  carvedilol 12.5 milliGRAM(s) Oral every 12 hours  chlorhexidine 4% Liquid 1 Application(s) Topical daily  clopidogrel Tablet 75 milliGRAM(s) Oral daily  famotidine    Tablet 10 milliGRAM(s) Oral daily  gabapentin 200 milliGRAM(s) Oral two times a day  heparin   Injectable 5000 Unit(s) SubCutaneous every 8 hours  influenza   Vaccine 0.5 milliLiter(s) IntraMuscular once  insulin glargine Injectable (LANTUS) 12 Unit(s) SubCutaneous at bedtime  insulin lispro (ADMELOG) corrective regimen sliding scale   SubCutaneous Before meals and at bedtime  lidocaine/prilocaine Cream 1 Application(s) Topical <User Schedule>  polyethylene glycol 3350 17 Gram(s) Oral two times a day  senna 2 Tablet(s) Oral at bedtime  tamsulosin 0.4 milliGRAM(s) Oral at bedtime  valsartan 40 milliGRAM(s) Oral daily    MEDICATIONS  (PRN):  acetaminophen     Tablet .. 650 milliGRAM(s) Oral every 6 hours PRN Temp greater or equal to 38C (100.4F), Mild Pain (1 - 3)          Vital Signs Last 24 Hrs  T(C): 36.3 (31 Dec 2022 09:20), Max: 37.5 (31 Dec 2022 04:37)  T(F): 97.3 (31 Dec 2022 09:20), Max: 99.5 (31 Dec 2022 04:37)  HR: 82 (31 Dec 2022 09:20) (75 - 87)  BP: 136/71 (31 Dec 2022 09:20) (121/60 - 160/84)  BP(mean): --  RR: 18 (31 Dec 2022 09:20) (18 - 18)  SpO2: 98% (31 Dec 2022 09:20) (95% - 98%)    Parameters below as of 31 Dec 2022 09:20  Patient On (Oxygen Delivery Method): room air          neurologic exam:  MS - AAO x3, speech fluent, rep/naming intact, follows commands, attn/conc/recent and remote memory/fund of knowledge WNL  CN - PERRLA, EOMI, VFF, face sens/str/hearing WNL b/l, tongue/palate midline, trap 5/5 b/l  Motor - Normal bulk/tone, B/L bicep &  4+/5, b/l tricep 3+/5, deltoid 3/5, b/l LE 2/5, dorsiflexion and plantar flexion 4/5   Sens - LT intact all  DTR's - 1+ all and upgoing b/l plantar response  Coord - FtN intact b/l, intention tremors b/l   Gait and station - not assessed due to fall risk      Labs:   cbc  Chem      Coags  Lipids  A1C  CardiacMarkers    LFTs  UA  CSF  Immunological Labs    Radiology:   MR Acute Spinal Cord Compression (12.28.22 @ 16:36)   The cervical, thoracic, and lumbosacral vertebral bodies are normal in   height and signal intensity. There is a small osteophyte at the inferior   endplate of L4 with bright signal intensity on the T1-weighted images   suggesting fatty degenerative change. There are no disc herniations or   spinal stenosis. There are no acute fractures or dislocations. There is   no marrow edema. The cervical and thoracic cord size, contour, and signal   characteristics. The conus terminates normally at the T12-L1 level.    Mild degenerative disc disease at L5-S1 is noted.    Paraspinal soft tissues are unremarkable.      Impression: No cord or cauda equina compression

## 2022-12-31 NOTE — PROGRESS NOTE ADULT - ASSESSMENT
62y (1960) man with a PMHx significant for chronic advanced neuropathy, CAD s/p stent on DAPT 11/2022, DM, HTN, ESRD on dialysis M,W,F (last on 12/9) p/w b/l knee pain s/p fall 12/10, found to have subdural hematoma with no acute NSGY intervention. Neurology consulted for reported lethargy and inability to ambulate. Per wife, pt has been ambulating with a cane for several years due to diabetic neuropathy (uses a walker occasionally when he has gout flares).     Impression: acute on chronic worsening of generalized weakness (R>L), fluctuating MS likely multifactorial in setting of TME (new HD, anemia), cardiac arrhythmias, chronic severe peripheral neuropathy (likely 2/2 DM). LP to r/o AIDP, and other infectious/inflammatory/neoplastic etiologies given acute worsening. No cord compression on MRI    Recommendations:   [] check UA, TSH, T3/T4, vitamin B1, B6, B12, folate, homocysteine, methylmalonic acid, lactate, CK 71, ammonia 16, Cu, SPEP, HIV neg, ESR 72, , Zn, Vit D 25OH  [x] f/u rEEG-Mild-moderate nonspecific diffuse or multifocal cerebral dysfunction.   No epileptiform pattern or seizure seen.  [x] acute cord compression series neg  [] per PT, pt was able to ambulate with a walker earlier this admission (despite recent fall at home) and now is unable to ambulate. Given reported acute change, recommend LP. However pt on aspirin and plavix for recent cardiac stent 11/2022, and plavix would need to be held for 5-7 days prior to LP. Cardiology consult appreciated, patient has high risk of stent thrombosis if plavix is held, would hold off LP for now due to high risk.  [] outpatient EMG/NCS   [] evaluation for gout and pain management per primary team   [] Patient can follow up with Dr. Sidney Baca after discharge. Please instruct the patient to call [phone] to schedule an appointment within the next 1-2 weeks. Office is located at [address] [place] [address], [place] [address].    Case seen and discussed with neurology attending, Dr. Ortiz

## 2022-12-31 NOTE — H&P ADULT - NSHPPHYSICALEXAM_GEN_ALL_CORE
Vital Signs  T(C): 37.4 (12-31-22 @ 12:59), Max: 37.5 (12-31-22 @ 04:37)  HR: 86 (12-31-22 @ 12:59) (76 - 87)  BP: 124/64 (12-31-22 @ 12:59) (121/60 - 160/84)  RR: 16 (12-31-22 @ 12:59) (16 - 18)  SpO2: 96% (12-31-22 @ 12:59) (95% - 100%)    Gen - NAD, Comfortable  HEENT - NCAT, EOMI, MMM.    Neck - Supple, No limited ROM  Pulm - CTAB, No wheeze, No rhonchi, No crackles  Cardiovascular - RRR, S1S2, No m/r/g  Abdomen - Soft, NT/ND, +BS  Extremities - No edema, mm atrophy extremities  Neuro-     Cognitive - AAO to self, hospital, month, year     Communication - hypophonic, 1-2 words at a time, delayed verbal response. Fluent in English.      Attention: distractible      Memory: Recall 3 objects immediate 1/3 and 3 min later  with cues      Cranial Nerves - CN 2-12 intact. Follows EOMI but easily distracted from following Finger H. Difficult to asses visual fields, looks at physician hands. Wife reports poor vision from diabetic retinopathy. Glasses bedside.      Motor -                    LEFT    UE - ShAB 3/5, EF 3/5, EE 3/5, WE 3/5,  3/5.           Antigravity+  Does not resist much force if any- allodynia to pressure of hand on arm vs weakness                    RIGHT UE - ShAB 4-/5, EF 4-/5, EE 4-/5, WE 4-/5,  3/5        ^ as above.                    LEFT    LE - HF 1/5, KE 2/5, DF 1/5, PF 1/5                    RIGHT LE - HF 1/5, KE 2/5, DF 1/5, PF 1/5        Sensory - Intact to LT. General allodynia (face to feet). Diabetic neuropathy stocking glove.      Reflexes - DTR Intact, only tested Patellas as patient reported pain with hammer hitting my fingers over his tendon.      Coordination - FTN intact R. L mild dysmetria.      Tone - muscle atrophy of calves. Noted flexion of R knee. Pain with attempted extension. Flexion contracture? Wife reports new onset since fall with recent hospital admission.  No erythema, warmth, or fluctuance to knee.   Psychiatric - slight emotional lability  Wounds: Non blanchable L heel (stage 1 pressure ulcer).  R buttock healing stage 2 pressure ulcer (deeper erythema) in Center of non blanchable erythema stage 1 surrounding. Vital Signs  T(C): 37.4 (12-31-22 @ 12:59), Max: 37.5 (12-31-22 @ 04:37)  HR: 86 (12-31-22 @ 12:59) (76 - 87)  BP: 124/64 (12-31-22 @ 12:59) (121/60 - 160/84)  RR: 16 (12-31-22 @ 12:59) (16 - 18)  SpO2: 96% (12-31-22 @ 12:59) (95% - 100%)    Gen - NAD, Comfortable  HEENT - NCAT, EOMI, MMM.    Neck - Supple, No limited ROM  Pulm - CTAB, No wheeze, No rhonchi, No crackles  Cardiovascular - RRR, S1S2, No m/r/g  Abdomen - Soft, NT/ND, +BS  Extremities - No edema, mm atrophy extremities  Neuro-     Cognitive - AAO to self, hospital, month, year     Communication - hypophonic, 1-2 words at a time, delayed verbal response. Fluent in English.      Attention: distractible      Memory: Recall 3 objects immediate 1/3 and 3 min later  with cues      Cranial Nerves - CN 2-12 intact. Follows EOMI but easily distracted from following Finger H. Difficult to asses visual fields, looks at physician hands. Wife reports poor vision from diabetic retinopathy. Glasses bedside.      Motor -                    LEFT    UE - ShAB 3/5, EF 3/5, EE 3/5, WE 3/5,  3/5.           Antigravity+  Does not resist much force if any- allodynia to pressure of hand on arm vs weakness                    RIGHT UE - ShAB 4-/5, EF 4-/5, EE 4-/5, WE 4-/5,  3/5        ^ as above.                    LEFT    LE - HF 1/5, KE 2/5, DF 1/5, PF 1/5                    RIGHT LE - HF 1/5, KE 2/5, DF 1/5, PF 1/5        Sensory - Intact to LT. General allodynia (face to feet). Diabetic neuropathy stocking glove.      Reflexes - DTR Intact, only tested Patellas as patient reported pain with hammer hitting my fingers over his tendon.      Coordination - FTN intact R. L mild dysmetria.      Tone - muscle atrophy of calves. Noted flexion of R knee. Pain with attempted extension. Flexion contracture? Wife reports new onset since fall with recent hospital admission.  No erythema, warmth, or fluctuance to knee.   Psychiatric - slight emotional lability  Wounds:  R buttock healing stage 2 pressure ulcer; Left heel DTI
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
## 2022-12-31 NOTE — DISCHARGE NOTE NURSING/CASE MANAGEMENT/SOCIAL WORK - NSDCVIVACCINE_GEN_ALL_CORE_FT
COVID-19, mRNA, LNP-S, PF, 30 mcg/0.3 mL dose (Pfizer); 09-Dec-2021 11:24; Sobeida Kang (RN); [employer]; [id number] (Exp. Date: 31-Dec-2021); IntraMuscular; Deltoid Left.; 0.3 milliLiter(s);   influenza, injectable, quadrivalent, preservative free; 09-Dec-2021 11:25; Sobeida Kang (SELVIN); [employer]; [id number] (Exp. Date: 30-Jun-2022); IntraMuscular; Deltoid Right.; 0.5 milliLiter(s); VIS (VIS Published: 06-Aug-2021, VIS Presented: 09-Dec-2021);

## 2022-12-31 NOTE — H&P ADULT - NSHPLABSRESULTS_GEN_ALL_CORE
RECENT LABS    Vital Signs Last 24 Hrs  T(C): 37.4 (31 Dec 2022 12:59), Max: 37.5 (31 Dec 2022 04:37)  T(F): 99.4 (31 Dec 2022 12:59), Max: 99.5 (31 Dec 2022 04:37)  HR: 86 (31 Dec 2022 12:59) (76 - 87)  BP: 124/64 (31 Dec 2022 12:59) (121/60 - 160/84)  RR: 16 (31 Dec 2022 12:59) (16 - 18)  SpO2: 96% (31 Dec 2022 12:59) (95% - 100%) on RA    EEG Summary / Classification:  Abnormal EEG in an confused patient.  - Mild – moderate generalized slowing.    EEG Impression / Clinical Correlate:  Abnormal EEG study.  Mild-moderate nonspecific diffuse or multifocal cerebral dysfunction.   No epileptiform pattern or seizure seen.    < from: MR Acute Spinal Cord Compression (12.28.22 @ 16:36) >  Mild degenerative disc disease at L5-S1 is noted.  Paraspinal soft tissues are unremarkable.  Impression: No cord or cauda equina compression    < from: Xray Shoulder 2 Views, Right (12.24.22 @ 18:59) >  INTERPRETATION:  CLINICAL INDICATION: right shoulder pain  EXAM:  Internal and external rotation AP and transscapular Y right shoulder from   12/24/2022 at 1859. No similar prior studies available for comparison.  IMPRESSION:  No fractures, dislocations, or AC separation.  Slightly hypertrophic AC joint arthrosis. Trace degenerative spur along   inferior humeral head articular margin.  Maintained subacromial and coracoclavicular spaces.  No destructive osseous lesions or periosteal reaction.  No periarticular soft tissue calcifications.  Vascular calcifications in right axillary/brachial region.  --- End of Report ---    < from: CT Head No Cont (12.24.22 @ 18:28) >  IMPRESSION: No change since 12/22/2022. Right temporal subdural hematoma   with mild sulcal effacement.< from: CT Head No Cont (12.22.22 @ 18:04) >  IMPRESSION:  Stable 1.2 cm mixed density subdural hematoma along the right convexity.   There is extension along the right tentorium and into the posterior   interhemispheric fissure, unchanged. There is mass effect resulting in   sulcal effacement, without midline shift, similar to prior.    < from: Xray Chest 1 View- PORTABLE-Urgent (Xray Chest 1 View- PORTABLE-Urgent .) (12.22.22 @ 10:20) >  IMPRESSION:  Clear lungs.  No pleural effusion seen.    < from: MR Knee No Cont, Right (12.16.22 @ 23:32) >  IMPRESSION:  Evaluation of the menisci is limited due to motion artifact.  Probable horizontal/oblique undersurface tear of the body segment of the   medial meniscus.  Complex signal within the body segment of the lateral meniscus, which may   be related to artifact or be related to a meniscal tear.  Minimal cartilage wear within the patellofemoral compartment.  Moderate patellar tendinosis.  Nonspecific small knee joint effusion.  Nonspecific edema within the proximal musculature of the calf, which is   incompletely evaluated.    < from: VA Duplex Upper Ext Vein Scan, Right (12.14.22 @ 10:53) >  IMPRESSION:  No evidence of right upper extremity deep venous thrombosis.    < from: CT Thoracic Spine Reform No Cont (12.12.22 @ 23:57) >  IMPRESSION:  No acute fracture identified.    < from: CT Chest No Cont (12.12.22 @ 23:50) >  IMPRESSION:  Small bilateral pleural effusions. Trace pericardial effusion.  < end of copied text >    < from: TTE with Doppler (w/Cont) (10.28.22 @ 12:56) >  EF (Phillips Rule): 39 %  < end of copied text > CAPILLARY BLOOD GLUCOSE  POCT Blood Glucose.: 160 mg/dL (31 Dec 2022 15:34)  POCT Blood Glucose.: 83 mg/dL (31 Dec 2022 05:46)  POCT Blood Glucose.: 180 mg/dL (30 Dec 2022 21:13)             7.8    10.64 )-----------( 407      ( 29 Dec 2022 06:40 )             25.2     12-28    139  |  97  |  73<H>  ----------------------------<  95  4.8   |  25  |  7.13<H>    Ca    9.2      28 Dec 2022 10:02    TPro  6.4  /  Alb  x   /  TBili  x   /  DBili  x   /  AST  x   /  ALT  x   /  AlkPhos  x   12-29    EEG Summary / Classification:  Abnormal EEG in an confused patient.  - Mild – moderate generalized slowing. Mild-moderate nonspecific diffuse or multifocal cerebral dysfunction.   No epileptiform pattern or seizure seen.    < from: MR Acute Spinal Cord Compression (12.28.22 @ 16:36) >  Mild degenerative disc disease at L5-S1 is noted.  Paraspinal soft tissues are unremarkable.  Impression: No cord or cauda equina compression    < from: Xray Shoulder 2 Views, Right (12.24.22 @ 18:59) >  INTERPRETATION:  CLINICAL INDICATION: right shoulder pain  EXAM:  Internal and external rotation AP and transscapular Y right shoulder from   12/24/2022 at 1859. No similar prior studies available for comparison.  IMPRESSION:  No fractures, dislocations, or AC separation.  Slightly hypertrophic AC joint arthrosis. Trace degenerative spur along   inferior humeral head articular margin.  Maintained subacromial and coracoclavicular spaces.  No destructive osseous lesions or periosteal reaction.  No periarticular soft tissue calcifications.  Vascular calcifications in right axillary/brachial region.  --- End of Report ---    < from: CT Head No Cont (12.24.22 @ 18:28) >  IMPRESSION: No change since 12/22/2022. Right temporal subdural hematoma   with mild sulcal effacement.< from: CT Head No Cont (12.22.22 @ 18:04) >  IMPRESSION:  Stable 1.2 cm mixed density subdural hematoma along the right convexity.   There is extension along the right tentorium and into the posterior   interhemispheric fissure, unchanged. There is mass effect resulting in   sulcal effacement, without midline shift, similar to prior.    < from: Xray Chest 1 View- PORTABLE-Urgent (Xray Chest 1 View- PORTABLE-Urgent .) (12.22.22 @ 10:20) >  IMPRESSION:  Clear lungs.  No pleural effusion seen.    < from: MR Knee No Cont, Right (12.16.22 @ 23:32) >  IMPRESSION:  Evaluation of the menisci is limited due to motion artifact.  Probable horizontal/oblique undersurface tear of the body segment of the   medial meniscus.  Complex signal within the body segment of the lateral meniscus, which may   be related to artifact or be related to a meniscal tear.  Minimal cartilage wear within the patellofemoral compartment.  Moderate patellar tendinosis.  Nonspecific small knee joint effusion.  Nonspecific edema within the proximal musculature of the calf, which is   incompletely evaluated.    < from: VA Duplex Upper Ext Vein Scan, Right (12.14.22 @ 10:53) >  IMPRESSION:  No evidence of right upper extremity deep venous thrombosis.    < from: CT Thoracic Spine Reform No Cont (12.12.22 @ 23:57) >  IMPRESSION:  No acute fracture identified.    < from: CT Chest No Cont (12.12.22 @ 23:50) >  IMPRESSION:  Small bilateral pleural effusions. Trace pericardial effusion.  < end of copied text >    < from: TTE with Doppler (w/Cont) (10.28.22 @ 12:56) >  EF (Phillips Rule): 39 %  < end of copied text >

## 2023-01-01 LAB
A1C WITH ESTIMATED AVERAGE GLUCOSE RESULT: 7.9 % — HIGH (ref 4–5.6)
ALBUMIN SERPL ELPH-MCNC: 2.1 G/DL — LOW (ref 3.3–5)
ALP SERPL-CCNC: 149 U/L — HIGH (ref 40–120)
ALT FLD-CCNC: 19 U/L — SIGNIFICANT CHANGE UP (ref 10–45)
ANION GAP SERPL CALC-SCNC: 9 MMOL/L — SIGNIFICANT CHANGE UP (ref 5–17)
AST SERPL-CCNC: 23 U/L — SIGNIFICANT CHANGE UP (ref 10–40)
BASOPHILS # BLD AUTO: 0.06 K/UL — SIGNIFICANT CHANGE UP (ref 0–0.2)
BASOPHILS NFR BLD AUTO: 0.8 % — SIGNIFICANT CHANGE UP (ref 0–2)
BILIRUB SERPL-MCNC: 0.4 MG/DL — SIGNIFICANT CHANGE UP (ref 0.2–1.2)
BUN SERPL-MCNC: 36 MG/DL — HIGH (ref 7–23)
CALCIUM SERPL-MCNC: 8.9 MG/DL — SIGNIFICANT CHANGE UP (ref 8.4–10.5)
CHLORIDE SERPL-SCNC: 97 MMOL/L — SIGNIFICANT CHANGE UP (ref 96–108)
CO2 SERPL-SCNC: 28 MMOL/L — SIGNIFICANT CHANGE UP (ref 22–31)
CREAT SERPL-MCNC: 5.45 MG/DL — HIGH (ref 0.5–1.3)
EGFR: 11 ML/MIN/1.73M2 — LOW
EOSINOPHIL # BLD AUTO: 0.31 K/UL — SIGNIFICANT CHANGE UP (ref 0–0.5)
EOSINOPHIL NFR BLD AUTO: 3.9 % — SIGNIFICANT CHANGE UP (ref 0–6)
ESTIMATED AVERAGE GLUCOSE: 180 MG/DL — HIGH (ref 68–114)
GLUCOSE BLDC GLUCOMTR-MCNC: 136 MG/DL — HIGH (ref 70–99)
GLUCOSE BLDC GLUCOMTR-MCNC: 158 MG/DL — HIGH (ref 70–99)
GLUCOSE BLDC GLUCOMTR-MCNC: 192 MG/DL — HIGH (ref 70–99)
GLUCOSE BLDC GLUCOMTR-MCNC: 204 MG/DL — HIGH (ref 70–99)
GLUCOSE SERPL-MCNC: 126 MG/DL — HIGH (ref 70–99)
HCT VFR BLD CALC: 24.1 % — LOW (ref 39–50)
HGB BLD-MCNC: 7.5 G/DL — LOW (ref 13–17)
IMM GRANULOCYTES NFR BLD AUTO: 0.4 % — SIGNIFICANT CHANGE UP (ref 0–0.9)
LYMPHOCYTES # BLD AUTO: 1.83 K/UL — SIGNIFICANT CHANGE UP (ref 1–3.3)
LYMPHOCYTES # BLD AUTO: 23.1 % — SIGNIFICANT CHANGE UP (ref 13–44)
MCHC RBC-ENTMCNC: 27.6 PG — SIGNIFICANT CHANGE UP (ref 27–34)
MCHC RBC-ENTMCNC: 31.1 GM/DL — LOW (ref 32–36)
MCV RBC AUTO: 88.6 FL — SIGNIFICANT CHANGE UP (ref 80–100)
MONOCYTES # BLD AUTO: 0.82 K/UL — SIGNIFICANT CHANGE UP (ref 0–0.9)
MONOCYTES NFR BLD AUTO: 10.3 % — SIGNIFICANT CHANGE UP (ref 2–14)
NEUTROPHILS # BLD AUTO: 4.88 K/UL — SIGNIFICANT CHANGE UP (ref 1.8–7.4)
NEUTROPHILS NFR BLD AUTO: 61.5 % — SIGNIFICANT CHANGE UP (ref 43–77)
NRBC # BLD: 0 /100 WBCS — SIGNIFICANT CHANGE UP (ref 0–0)
PLATELET # BLD AUTO: 343 K/UL — SIGNIFICANT CHANGE UP (ref 150–400)
POTASSIUM SERPL-MCNC: 3.9 MMOL/L — SIGNIFICANT CHANGE UP (ref 3.5–5.3)
POTASSIUM SERPL-SCNC: 3.9 MMOL/L — SIGNIFICANT CHANGE UP (ref 3.5–5.3)
PROT SERPL-MCNC: 7.1 G/DL — SIGNIFICANT CHANGE UP (ref 6–8.3)
RBC # BLD: 2.72 M/UL — LOW (ref 4.2–5.8)
RBC # FLD: 15.5 % — HIGH (ref 10.3–14.5)
SODIUM SERPL-SCNC: 134 MMOL/L — LOW (ref 135–145)
WBC # BLD: 7.93 K/UL — SIGNIFICANT CHANGE UP (ref 3.8–10.5)
WBC # FLD AUTO: 7.93 K/UL — SIGNIFICANT CHANGE UP (ref 3.8–10.5)

## 2023-01-01 PROCEDURE — 99223 1ST HOSP IP/OBS HIGH 75: CPT

## 2023-01-01 PROCEDURE — 71045 X-RAY EXAM CHEST 1 VIEW: CPT | Mod: 26

## 2023-01-01 PROCEDURE — 99255 IP/OBS CONSLTJ NEW/EST HI 80: CPT

## 2023-01-01 PROCEDURE — 99223 1ST HOSP IP/OBS HIGH 75: CPT | Mod: GC

## 2023-01-01 RX ADMIN — GABAPENTIN 100 MILLIGRAM(S): 400 CAPSULE ORAL at 08:41

## 2023-01-01 RX ADMIN — Medication 2: at 16:46

## 2023-01-01 RX ADMIN — ZINC OXIDE 1 APPLICATION(S): 200 OINTMENT TOPICAL at 11:41

## 2023-01-01 RX ADMIN — Medication 100 MILLIGRAM(S): at 08:41

## 2023-01-01 RX ADMIN — POLYETHYLENE GLYCOL 3350 17 GRAM(S): 17 POWDER, FOR SOLUTION ORAL at 17:24

## 2023-01-01 RX ADMIN — CARVEDILOL PHOSPHATE 12.5 MILLIGRAM(S): 80 CAPSULE, EXTENDED RELEASE ORAL at 21:07

## 2023-01-01 RX ADMIN — FAMOTIDINE 10 MILLIGRAM(S): 10 INJECTION INTRAVENOUS at 11:44

## 2023-01-01 RX ADMIN — VALSARTAN 40 MILLIGRAM(S): 80 TABLET ORAL at 05:22

## 2023-01-01 RX ADMIN — CARVEDILOL PHOSPHATE 12.5 MILLIGRAM(S): 80 CAPSULE, EXTENDED RELEASE ORAL at 08:41

## 2023-01-01 RX ADMIN — CLOPIDOGREL BISULFATE 75 MILLIGRAM(S): 75 TABLET, FILM COATED ORAL at 11:42

## 2023-01-01 RX ADMIN — HEPARIN SODIUM 5000 UNIT(S): 5000 INJECTION INTRAVENOUS; SUBCUTANEOUS at 22:19

## 2023-01-01 RX ADMIN — POLYETHYLENE GLYCOL 3350 17 GRAM(S): 17 POWDER, FOR SOLUTION ORAL at 05:22

## 2023-01-01 RX ADMIN — HEPARIN SODIUM 5000 UNIT(S): 5000 INJECTION INTRAVENOUS; SUBCUTANEOUS at 14:20

## 2023-01-01 RX ADMIN — ATORVASTATIN CALCIUM 80 MILLIGRAM(S): 80 TABLET, FILM COATED ORAL at 22:18

## 2023-01-01 RX ADMIN — INSULIN GLARGINE 12 UNIT(S): 100 INJECTION, SOLUTION SUBCUTANEOUS at 22:18

## 2023-01-01 RX ADMIN — SENNA PLUS 2 TABLET(S): 8.6 TABLET ORAL at 22:18

## 2023-01-01 RX ADMIN — HEPARIN SODIUM 5000 UNIT(S): 5000 INJECTION INTRAVENOUS; SUBCUTANEOUS at 05:22

## 2023-01-01 RX ADMIN — Medication 3 MILLIGRAM(S): at 22:19

## 2023-01-01 RX ADMIN — GABAPENTIN 100 MILLIGRAM(S): 400 CAPSULE ORAL at 17:25

## 2023-01-01 RX ADMIN — Medication 4: at 11:49

## 2023-01-01 RX ADMIN — TAMSULOSIN HYDROCHLORIDE 0.4 MILLIGRAM(S): 0.4 CAPSULE ORAL at 22:18

## 2023-01-01 RX ADMIN — Medication 81 MILLIGRAM(S): at 11:43
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
## 2023-01-01 NOTE — BH CONSULTATION LIAISON ASSESSMENT NOTE - GENERAL APPEARANCE
This note was copied from the mother's chart.  Inpatient Lactation Consult    Maternal history:  Past Medical History:   Diagnosis Date   • History of anxiety     and depression, situational   • History of cold sores    • History of hypertension     when pt was in her 20's, took a betablocker   • Kidney stones    • Migraines       Past Surgical History:   Procedure Laterality Date   • Oral surgery procedure Bilateral       OB History    Para Term  AB Living   3 1 1 0 2 1   SAB IAB Ectopic Molar Multiple Live Births   0 2 0 0 0 1      Current Facility-Administered Medications   Medication   • methylergonovine (METHERGINE) injection 200 mcg   • carboprost (HEMABATE) injection 250 mcg   • valACYclovir (VALTREX) tablet 500 mg   • ondansetron (ZOFRAN) injection 4 mg   • oxyTOCIN (PITOCIN) injection 10 Units   • oxyTOCIN (PITOCIN) 30 Units in sodium chloride 0.9% 500 mL   • acetaminophen (TYLENOL) tablet 650 mg   • ibuprofen (MOTRIN) tablet 600 mg   • measles-mumps-rubella vaccine 0.5 mL   • varicella virus (VARIVAX) live vaccine 0.5 mL   • diphtheria-pertussis (acellular)-tetanus (BOOSTRIX) vaccine 0.5 mL   • hydroCORTisone-pramoxine (ANALPRAM-HC) 2.5-1 % rectal cream   • simethicone (MYLICON) tablet 125 mg   • calcium carbonate (TUMS) chewable tablet 500 mg   • polyethylene glycol (MIRALAX) packet 17 g   • docusate sodium-sennosides (SENOKOT S) 50-8.6 MG 2 tablet   • bisacodyl (DULCOLAX) suppository 10 mg   • magnesium hydroxide (MILK OF MAGNESIA) 400 MG/5ML suspension 30 mL   • ferrous sulfate (65 mg Fe per 325 mg) tablet 325 mg         P - Knowledge deficit r/t breastfeeding    I- Met with dyad on rounds. Mother reports  is breastfeeding with the nipple shield but sleepy at the breast. States unsure amount of breast milk  receiving. [place] noted to be sleeping at the breast. Maternal breast exam reveals pendulous breast, nipples norma, short and R areolar edema. [place] oral assessment  reveals disorganized suck to start with tongue pulled back but able to convert to organized suck on gloved finger. Assisted with positioning in football hold on the L breast and  latch on / off for ~ 5 min session. [place] maintained for longer duration as feeding progressed. Audible swallows noted as feeding progressed. Assisted with pumping for stimulation. Drops of colostrum expressed which was finger fed to .  Discussed positioning, asymmetric latch, breast compression, typical  behavior, stressed 8-12 feeding/pumping sessions in 24 hours with no time limits while breastfeeding, breast milk production, supply/demand, frequency/duration, feeding cues,  signs of effective breastfeeding, and monitoring  output. Encouraged to call for assistance as needed.  Education, provided - encouraged to call  with any questions/concerns/need for assistance.  Reports having a breast pump for home use. Questions answered. Discussed plan of care attempting breastfeeding, pumping for stimulation, complement / supplement feeding if no latch. Discussed to call for assistance as needed and as questions arise. Sarah AREVALO updated.    E- Mother verbalized understanding of plan of care and instructions.    Plan - Will continue to follow as needed.   Deformities present

## 2023-01-01 NOTE — CONSULT NOTE ADULT - SUBJECTIVE AND OBJECTIVE BOX
NEPHROLOGY CONSULTATION    CHIEF COMPLAINT:  ESRD      HPI:  62 year-old man with history of multiple medical issues including hypertension, diabetes mellitus, coronary artery diease, congestive heart failure with reduced EF, gout, and ESRD presented to [facility] after a fall and suffered a subdural hematoma.  He was last dialyzed Friday.   Neurosurgery stopped AC and treated him with Keppra.  BP can be low at times.    ROS:  denies CP, SOB, HA, dizziness    PAST MEDICAL & SURGICAL HISTORY:  Diabetes Mellitus Type II, Uncontrolled  Dyslipidemia  HTN (hypertension)  Gout  Diabetic retinopathy with retinal hemorrhage  GERD (gastroesophageal reflux disease)  Nephrolithiasis  Vitamin D deficiency  Hepatitis  ESRD on hemodialysis via AVF  Ischemic cardiomyopathy  ASHD (arteriosclerotic heart disease) with prior MI  Pulmonary hypertension  BPH (benign prostatic hyperplasia)    Surgical History  S/P coronary artery stent placement   TRICIA to Diag ; 2010  LAD; 11 ATOM liberte Diag; 5/15/2017  TRICIA to  diag; 17 PCI with laser and high pressure balloon  History of eye surgery, left eye  H/O lithotripsy  Insertion of Cardiomems monitor      SOCIAL HISTORY:  NA    FAMILY HISTORY:  NA      MEDICATIONS  (STANDING):  allopurinol 100 milliGRAM(s) Oral <User Schedule>  aspirin  chewable 81 milliGRAM(s) Oral daily  atorvastatin 80 milliGRAM(s) Oral at bedtime  carvedilol 12.5 milliGRAM(s) Oral <User Schedule>  clopidogrel Tablet 75 milliGRAM(s) Oral daily  dextrose 5%. 1000 milliLiter(s) (50 mL/Hr) IV Continuous <Continuous>  dextrose 5%. 1000 milliLiter(s) (100 mL/Hr) IV Continuous <Continuous>  dextrose 50% Injectable 25 Gram(s) IV Push once  dextrose 50% Injectable 12.5 Gram(s) IV Push once  dextrose 50% Injectable 25 Gram(s) IV Push once  famotidine    Tablet 10 milliGRAM(s) Oral daily  gabapentin 100 milliGRAM(s) Oral <User Schedule>  glucagon  Injectable 1 milliGRAM(s) IntraMuscular once  heparin   Injectable 5000 Unit(s) SubCutaneous every 8 hours  insulin glargine Injectable (LANTUS) 12 Unit(s) SubCutaneous at bedtime  insulin lispro (ADMELOG) corrective regimen sliding scale   SubCutaneous three times a day before meals  polyethylene glycol 3350 17 Gram(s) Oral two times a day  senna 2 Tablet(s) Oral at bedtime  tamsulosin 0.4 milliGRAM(s) Oral at bedtime  valsartan 40 milliGRAM(s) Oral daily  zinc oxide 40% Paste 1 Application(s) Topical daily      PHYSICAL EXAMINATION:  T(F): 98.8 (23 @ 07:48)  HR: 82 (23 @ 08:42)  BP: 141/65 (23 @ 08:42)  RR: 16 (23 @ 07:48)  SpO2: 98% (23 @ 07:48)  Conversant, no apparent distress  PERRLA, pink conjunctivae, no ptosis  Good dentition, no pharyngeal erythema  Neck non tender, no mass, no thyromegaly or nodules  Normal respiratory effort, lungs clear to auscultation  Heart with RRR, no murmurs or rubs, no peripheral edema  Abdomen soft, no masses, no organomegaly  Skin no rashes, ulcers or lesions, normal turgor and temperature  Appropriate affect, AO x 3  AVF in LUE has nice thrill and bruit    LABS:                        7.5    7.93  )-----------( 343      ( 2023 06:15 )             24.1         134<L>  |  97  |  36<H>  ----------------------------<  126<H>  3.9   |  28  |  5.45<H>    Ca    8.9      2023 06:15    TPro  7.1  /  Alb  2.1<L>  /  TBili  0.4  /  DBili  x   /  AST  23  /  ALT  19  /  AlkPhos  149<H>      Urinalysis Basic - ( 31 Dec 2022 22:00 )    Color: Brown / Appearance: Clear / S.020 / pH: x  Gluc: x / Ketone: Trace  / Bili: Small / Urobili: Negative   Blood: x / Protein: 100 / Nitrite: Negative   Leuk Esterase: Negative / RBC: 0-4 /HPF / WBC Negative /HPF   Sq Epi: x / Non Sq Epi: Neg.-Few / Bacteria: Negative /HPF      ASSESSMENT:  1.  ESRD on hemodialysis TTS  2.  Traumatic right temporal subdural hematoma  3.  Normocytic anemia, subacutely worsening    PLAN:  Resume hemodialysis Tuesday  May give one dose of Procrit 10K units SC now and continue dosing with each HD  Check TSAT/ferritin and replete iron stores with IV Fe at HD if necessary
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
## 2023-01-02 LAB
BASOPHILS # BLD AUTO: 0.03 K/UL — SIGNIFICANT CHANGE UP (ref 0–0.2)
BASOPHILS NFR BLD AUTO: 0.4 % — SIGNIFICANT CHANGE UP (ref 0–2)
D DIMER BLD IA.RAPID-MCNC: 1055 NG/ML DDU — HIGH
EOSINOPHIL # BLD AUTO: 0.41 K/UL — SIGNIFICANT CHANGE UP (ref 0–0.5)
EOSINOPHIL NFR BLD AUTO: 4.9 % — SIGNIFICANT CHANGE UP (ref 0–6)
FERRITIN SERPL-MCNC: 837 NG/ML — HIGH (ref 30–400)
GLUCOSE BLDC GLUCOMTR-MCNC: 164 MG/DL — HIGH (ref 70–99)
GLUCOSE BLDC GLUCOMTR-MCNC: 175 MG/DL — HIGH (ref 70–99)
GLUCOSE BLDC GLUCOMTR-MCNC: 196 MG/DL — HIGH (ref 70–99)
GLUCOSE BLDC GLUCOMTR-MCNC: 224 MG/DL — HIGH (ref 70–99)
HCT VFR BLD CALC: 24.4 % — LOW (ref 39–50)
HGB BLD-MCNC: 7.6 G/DL — LOW (ref 13–17)
IMM GRANULOCYTES NFR BLD AUTO: 0.6 % — SIGNIFICANT CHANGE UP (ref 0–0.9)
IRON SATN MFR SERPL: 15 % — LOW (ref 16–55)
IRON SATN MFR SERPL: 26 UG/DL — LOW (ref 45–165)
LYMPHOCYTES # BLD AUTO: 2.13 K/UL — SIGNIFICANT CHANGE UP (ref 1–3.3)
LYMPHOCYTES # BLD AUTO: 25.5 % — SIGNIFICANT CHANGE UP (ref 13–44)
MCHC RBC-ENTMCNC: 27 PG — SIGNIFICANT CHANGE UP (ref 27–34)
MCHC RBC-ENTMCNC: 31.1 GM/DL — LOW (ref 32–36)
MCV RBC AUTO: 86.8 FL — SIGNIFICANT CHANGE UP (ref 80–100)
MONOCYTES # BLD AUTO: 0.71 K/UL — SIGNIFICANT CHANGE UP (ref 0–0.9)
MONOCYTES NFR BLD AUTO: 8.5 % — SIGNIFICANT CHANGE UP (ref 2–14)
NEUTROPHILS # BLD AUTO: 5.03 K/UL — SIGNIFICANT CHANGE UP (ref 1.8–7.4)
NEUTROPHILS NFR BLD AUTO: 60.1 % — SIGNIFICANT CHANGE UP (ref 43–77)
NRBC # BLD: 0 /100 WBCS — SIGNIFICANT CHANGE UP (ref 0–0)
PLATELET # BLD AUTO: 387 K/UL — SIGNIFICANT CHANGE UP (ref 150–400)
RBC # BLD: 2.81 M/UL — LOW (ref 4.2–5.8)
RBC # BLD: 2.81 M/UL — LOW (ref 4.2–5.8)
RBC # FLD: 15 % — HIGH (ref 10.3–14.5)
RETICS #: 21.9 K/UL — LOW (ref 25–125)
RETICS/RBC NFR: 0.8 % — SIGNIFICANT CHANGE UP (ref 0.5–2.5)
TIBC SERPL-MCNC: 169 UG/DL — LOW (ref 220–430)
UIBC SERPL-MCNC: 143 UG/DL — SIGNIFICANT CHANGE UP (ref 110–370)
WBC # BLD: 8.36 K/UL — SIGNIFICANT CHANGE UP (ref 3.8–10.5)
WBC # FLD AUTO: 8.36 K/UL — SIGNIFICANT CHANGE UP (ref 3.8–10.5)
ZINC SERPL-MCNC: 61 UG/DL — SIGNIFICANT CHANGE UP (ref 44–115)

## 2023-01-02 PROCEDURE — 99232 SBSQ HOSP IP/OBS MODERATE 35: CPT

## 2023-01-02 PROCEDURE — 93970 EXTREMITY STUDY: CPT | Mod: 26

## 2023-01-02 PROCEDURE — 99233 SBSQ HOSP IP/OBS HIGH 50: CPT

## 2023-01-02 RX ORDER — ERYTHROPOIETIN 10000 [IU]/ML
10000 INJECTION, SOLUTION INTRAVENOUS; SUBCUTANEOUS
Refills: 0 | Status: DISCONTINUED | OUTPATIENT
Start: 2023-01-02 | End: 2023-01-27

## 2023-01-02 RX ADMIN — Medication 81 MILLIGRAM(S): at 11:29

## 2023-01-02 RX ADMIN — CLOPIDOGREL BISULFATE 75 MILLIGRAM(S): 75 TABLET, FILM COATED ORAL at 11:29

## 2023-01-02 RX ADMIN — FAMOTIDINE 10 MILLIGRAM(S): 10 INJECTION INTRAVENOUS at 11:29

## 2023-01-02 RX ADMIN — Medication 100 MILLIGRAM(S): at 08:08

## 2023-01-02 RX ADMIN — ZINC OXIDE 1 APPLICATION(S): 200 OINTMENT TOPICAL at 11:31

## 2023-01-02 RX ADMIN — TAMSULOSIN HYDROCHLORIDE 0.4 MILLIGRAM(S): 0.4 CAPSULE ORAL at 21:14

## 2023-01-02 RX ADMIN — HEPARIN SODIUM 5000 UNIT(S): 5000 INJECTION INTRAVENOUS; SUBCUTANEOUS at 13:25

## 2023-01-02 RX ADMIN — Medication 2: at 11:23

## 2023-01-02 RX ADMIN — CARVEDILOL PHOSPHATE 12.5 MILLIGRAM(S): 80 CAPSULE, EXTENDED RELEASE ORAL at 21:13

## 2023-01-02 RX ADMIN — Medication 2: at 08:03

## 2023-01-02 RX ADMIN — VALSARTAN 40 MILLIGRAM(S): 80 TABLET ORAL at 05:49

## 2023-01-02 RX ADMIN — SENNA PLUS 2 TABLET(S): 8.6 TABLET ORAL at 21:14

## 2023-01-02 RX ADMIN — GABAPENTIN 100 MILLIGRAM(S): 400 CAPSULE ORAL at 08:08

## 2023-01-02 RX ADMIN — HEPARIN SODIUM 5000 UNIT(S): 5000 INJECTION INTRAVENOUS; SUBCUTANEOUS at 21:13

## 2023-01-02 RX ADMIN — Medication 2: at 16:44

## 2023-01-02 RX ADMIN — ATORVASTATIN CALCIUM 80 MILLIGRAM(S): 80 TABLET, FILM COATED ORAL at 21:14

## 2023-01-02 RX ADMIN — HEPARIN SODIUM 5000 UNIT(S): 5000 INJECTION INTRAVENOUS; SUBCUTANEOUS at 05:48

## 2023-01-02 RX ADMIN — GABAPENTIN 100 MILLIGRAM(S): 400 CAPSULE ORAL at 17:01

## 2023-01-02 RX ADMIN — POLYETHYLENE GLYCOL 3350 17 GRAM(S): 17 POWDER, FOR SOLUTION ORAL at 05:49

## 2023-01-02 RX ADMIN — INSULIN GLARGINE 12 UNIT(S): 100 INJECTION, SOLUTION SUBCUTANEOUS at 21:46

## 2023-01-02 NOTE — PROGRESS NOTE ADULT - SUBJECTIVE AND OBJECTIVE BOX
Patient seen and examined at bedside. No overnight events per nursing. Patient notes fatigue and poor sleep. Discussed at length that his anemia is likely contributing to his fatigue. Discussed that given his cardiac hx our goal would be above 8 and that his renal dysfunction is likely a large contributor to his anemia. I discussed risk/benefit of transfusion with him and at the present time he would like to postpone transfusion until additional laboratory studies to evaluate his anemia are obtained. He would also like to see his functional activity today with PT/OT before making a decision in regards to transfusion.     ALLERGIES:  No Known Drug Allergies  shellfish (Unknown)    MEDICATIONS  (STANDING):  allopurinol 100 milliGRAM(s) Oral <User Schedule>  aspirin  chewable 81 milliGRAM(s) Oral daily  atorvastatin 80 milliGRAM(s) Oral at bedtime  carvedilol 12.5 milliGRAM(s) Oral <User Schedule>  clopidogrel Tablet 75 milliGRAM(s) Oral daily  dextrose 5%. 1000 milliLiter(s) (50 mL/Hr) IV Continuous <Continuous>  dextrose 5%. 1000 milliLiter(s) (100 mL/Hr) IV Continuous <Continuous>  dextrose 50% Injectable 25 Gram(s) IV Push once  dextrose 50% Injectable 12.5 Gram(s) IV Push once  dextrose 50% Injectable 25 Gram(s) IV Push once  famotidine    Tablet 10 milliGRAM(s) Oral daily  gabapentin 100 milliGRAM(s) Oral <User Schedule>  glucagon  Injectable 1 milliGRAM(s) IntraMuscular once  heparin   Injectable 5000 Unit(s) SubCutaneous every 8 hours  insulin glargine Injectable (LANTUS) 12 Unit(s) SubCutaneous at bedtime  insulin lispro (ADMELOG) corrective regimen sliding scale   SubCutaneous three times a day before meals  polyethylene glycol 3350 17 Gram(s) Oral two times a day  senna 2 Tablet(s) Oral at bedtime  tamsulosin 0.4 milliGRAM(s) Oral at bedtime  valsartan 40 milliGRAM(s) Oral daily  zinc oxide 40% Paste 1 Application(s) Topical daily    MEDICATIONS  (PRN):  acetaminophen     Tablet .. 650 milliGRAM(s) Oral every 6 hours PRN Temp greater or equal to 38C (100.4F)  dextrose Oral Gel 15 Gram(s) Oral once PRN Blood Glucose LESS THAN 70 milliGRAM(s)/deciliter  melatonin 3 milliGRAM(s) Oral at bedtime PRN Insomnia    Vital Signs Last 24 Hrs  T(F): 98.3 (2023 07:57), Max: 100 (2023 21:27)  HR: 87 (2023 07:57) (80 - 87)  BP: 101/57 (2023 07:57) (101/57 - 147/60)  RR: 16 (2023 07:57) (16 - 16)  SpO2: 95% (2023 07:57) (94% - 95%)  I&O's Summary    BMI (kg/m2): 27.3 (12--22 @ 12:59)  PHYSICAL EXAM:  Gen: nad, resting in bed  Neuro: aaox3, no focal deficits  Heent: eomi b/l, no jvd, no oral exudates  Pulm: cta b/l, no w/r/r  CV: +s1s2, no m/r/g  Ab: soft, nt/nd, normoactive bs x 4  Extrem: no edema, pulses intact and equal      LABS:                        7.6    8.36  )-----------( 387      ( 2023 06:07 )             24.4       -    134  |  97  |  36  ----------------------------<  126  3.9   |  28  |  5.45    Ca    8.9      2023 06:15    TPro  7.1  /  Alb  2.1  /  TBili  0.4  /  DBili  x   /  AST  23  /  ALT  19  /  AlkPhos  149         POCT Blood Glucose.: 164 mg/dL (2023 08:00)  POCT Blood Glucose.: 192 mg/dL (2023 21:04)  POCT Blood Glucose.: 158 mg/dL (2023 16:45)  POCT Blood Glucose.: 204 mg/dL (2023 11:47)      Urinalysis Basic - ( 31 Dec 2022 22:00 )    Color: Brown / Appearance: Clear / S.020 / pH: x  Gluc: x / Ketone: Trace  / Bili: Small / Urobili: Negative   Blood: x / Protein: 100 / Nitrite: Negative   Leuk Esterase: Negative / RBC: 0-4 /HPF / WBC Negative /HPF   Sq Epi: x / Non Sq Epi: Neg.-Few / Bacteria: Negative /HPF        COVID-19 PCR: NotDetec (22 @ 09:48)  COVID-19 PCR: NotDetec (22 @ 23:02)  COVID-19 PCR: Detected (22 @ 10:37)  COVID-19 PCR: NotDetec (22 @ 22:30)  COVID-19 PCR: Detected (22 @ 15:17)

## 2023-01-02 NOTE — PROGRESS NOTE ADULT - SUBJECTIVE AND OBJECTIVE BOX
Resting    Vital Signs Last 24 Hrs  T(C): 36.9 (01-02-23 @ 20:55), Max: 36.9 (01-02-23 @ 20:55)  T(F): 98.4 (01-02-23 @ 20:55), Max: 98.4 (01-02-23 @ 20:55)  HR: 90 (01-02-23 @ 20:55) (87 - 90)  BP: 150/77 (01-02-23 @ 20:55) (101/57 - 150/77)  RR: 16 (01-02-23 @ 20:55) (16 - 16)  SpO2: 98% (01-02-23 @ 20:55) (95% - 98%)    s1s2  b/l air entry  soft, ND  no edema         LABS                             7.6    8.36  )-----------( 387      ( 02 Jan 2023 06:07 )             24.4          01-01    134<L>  |  97  |  36<H>  ----------------------------<  126<H>  3.9   |  28  |  5.45<H>    Ca    8.9      01 Jan 2023 06:15    TPro  7.1  /  Alb  2.1<L>  /  TBili  0.4  /  DBili  x   /  AST  23  /  ALT  19  /  AlkPhos  149<H>  01-01    acetaminophen     Tablet .. 650 milliGRAM(s) Oral every 6 hours PRN  allopurinol 100 milliGRAM(s) Oral <User Schedule>  aspirin  chewable 81 milliGRAM(s) Oral daily  atorvastatin 80 milliGRAM(s) Oral at bedtime  carvedilol 12.5 milliGRAM(s) Oral <User Schedule>  clopidogrel Tablet 75 milliGRAM(s) Oral daily  dextrose 5%. 1000 milliLiter(s) IV Continuous <Continuous>  dextrose 5%. 1000 milliLiter(s) IV Continuous <Continuous>  dextrose 50% Injectable 25 Gram(s) IV Push once  dextrose 50% Injectable 12.5 Gram(s) IV Push once  dextrose 50% Injectable 25 Gram(s) IV Push once  dextrose Oral Gel 15 Gram(s) Oral once PRN  famotidine    Tablet 10 milliGRAM(s) Oral daily  gabapentin 100 milliGRAM(s) Oral <User Schedule>  glucagon  Injectable 1 milliGRAM(s) IntraMuscular once  heparin   Injectable 5000 Unit(s) SubCutaneous every 8 hours  insulin glargine Injectable (LANTUS) 12 Unit(s) SubCutaneous at bedtime  insulin lispro (ADMELOG) corrective regimen sliding scale   SubCutaneous three times a day before meals  melatonin 3 milliGRAM(s) Oral at bedtime PRN  polyethylene glycol 3350 17 Gram(s) Oral two times a day  senna 2 Tablet(s) Oral at bedtime  tamsulosin 0.4 milliGRAM(s) Oral at bedtime  valsartan 40 milliGRAM(s) Oral daily  zinc oxide 40% Paste 1 Application(s) Topical daily    A/P:    DM, HTN, CM  S/p SDH  ESRD on HD TTS  HD tomorrow as ordered  Anemia  Epoetin w/HD  Will follow    [phone]

## 2023-01-02 NOTE — PROGRESS NOTE ADULT - SUBJECTIVE AND OBJECTIVE BOX
Slept well but is tired.  Feels depressed being here.   Provided emotional support, feels more positive knowing he will have therapy today.   Pain is controlled.   No other new ROS.  Has been tolerating rehabilitation program.    VITALS  T(C): 37.8 (23 @ 21:27), Max: 37.8 (23 @ 21:27)  HR: 80 (23 @ 21:00) (80 - 84)  BP: 147/60 (23 @ 21:00) (140/65 - 147/60)  RR: 16 (23 @ 21:00) (16 - 16)  SpO2: 94% (23 @ 21:00) (94% - 98%)  Wt(kg): --     MEDICATIONS   acetaminophen     Tablet .. 650 milliGRAM(s) every 6 hours PRN  allopurinol 100 milliGRAM(s) <User Schedule>  aspirin  chewable 81 milliGRAM(s) daily  atorvastatin 80 milliGRAM(s) at bedtime  carvedilol 12.5 milliGRAM(s) <User Schedule>  clopidogrel Tablet 75 milliGRAM(s) daily  dextrose 5%. 1000 milliLiter(s) <Continuous>  dextrose 5%. 1000 milliLiter(s) <Continuous>  dextrose 50% Injectable 25 Gram(s) once  dextrose 50% Injectable 12.5 Gram(s) once  dextrose 50% Injectable 25 Gram(s) once  dextrose Oral Gel 15 Gram(s) once PRN  famotidine    Tablet 10 milliGRAM(s) daily  gabapentin 100 milliGRAM(s) <User Schedule>  glucagon  Injectable 1 milliGRAM(s) once  heparin   Injectable 5000 Unit(s) every 8 hours  insulin glargine Injectable (LANTUS) 12 Unit(s) at bedtime  insulin lispro (ADMELOG) corrective regimen sliding scale   three times a day before meals  melatonin 3 milliGRAM(s) at bedtime PRN  polyethylene glycol 3350 17 Gram(s) two times a day  senna 2 Tablet(s) at bedtime  tamsulosin 0.4 milliGRAM(s) at bedtime  valsartan 40 milliGRAM(s) daily  zinc oxide 40% Paste 1 Application(s) daily      RECENT LABS/IMAGING                        7.6    8.36  )-----------( 387      ( 2023 06:07 )             24.4         134<L>  |  97  |  36<H>  ----------------------------<  126<H>  3.9   |  28  |  5.45<H>    Ca    8.9      2023 06:15    TPro  7.1  /  Alb  2.1<L>  /  TBili  0.4  /  DBili  x   /  AST  23  /  ALT  19  /  AlkPhos  149<H>        Urinalysis Basic - ( 31 Dec 2022 22:00 )    Color: Brown / Appearance: Clear / S.020 / pH: x  Gluc: x / Ketone: Trace  / Bili: Small / Urobili: Negative   Blood: x / Protein: 100 / Nitrite: Negative   Leuk Esterase: Negative / RBC: 0-4 /HPF / WBC Negative /HPF   Sq Epi: x / Non Sq Epi: Neg.-Few / Bacteria: Negative /HPF           POCT Blood Glucose.: 192 mg/dL (23 @ 21:04)  POCT Blood Glucose.: 158 mg/dL (23 @ 16:45)  POCT Blood Glucose.: 204 mg/dL (23 @ 11:47)  POCT Blood Glucose.: 136 mg/dL (23 @ 07:44)    ------------------------------------------  PHYSICAL EXAM  Constitutional - NAD, Comfortable  Pulm - Breathing comfortably, No wheezing  Abd - Nondistended  Extremities - No edema  Neurologic Exam - Awake, Alert  Psychiatric - Mood depressed    ASSESSMENT/PLAN  62y Male with impairments in mobility and ADLs   - Continue current rehabilitation program 3hrs a day   - Continue current medications, patient is medically stable - H/H stable, although low - continue to monitor; HgA1C significantly elevated and will need ongoing DM2 optimization  - DVT prophylaxis  - Skin - OOB and mobilization daily
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
## 2023-01-03 LAB
% ALBUMIN: 36.3 % — SIGNIFICANT CHANGE UP
% ALPHA 1: 9.1 % — SIGNIFICANT CHANGE UP
% ALPHA 2: 23.8 % — SIGNIFICANT CHANGE UP
% BETA: 13.3 % — SIGNIFICANT CHANGE UP
% GAMMA: 17.5 % — SIGNIFICANT CHANGE UP
ALBUMIN SERPL ELPH-MCNC: 2.3 G/DL — LOW (ref 3.3–5)
ALBUMIN SERPL ELPH-MCNC: 2.3 G/DL — LOW (ref 3.6–5.5)
ALBUMIN/GLOB SERPL ELPH: 0.6 RATIO — SIGNIFICANT CHANGE UP
ALP SERPL-CCNC: 145 U/L — HIGH (ref 40–120)
ALPHA1 GLOB SERPL ELPH-MCNC: 0.6 G/DL — HIGH (ref 0.1–0.4)
ALPHA2 GLOB SERPL ELPH-MCNC: 1.5 G/DL — HIGH (ref 0.5–1)
ALT FLD-CCNC: 16 U/L — SIGNIFICANT CHANGE UP (ref 10–45)
ANION GAP SERPL CALC-SCNC: 11 MMOL/L — SIGNIFICANT CHANGE UP (ref 5–17)
AST SERPL-CCNC: 15 U/L — SIGNIFICANT CHANGE UP (ref 10–40)
B-GLOBULIN SERPL ELPH-MCNC: 0.9 G/DL — SIGNIFICANT CHANGE UP (ref 0.5–1)
BILIRUB SERPL-MCNC: 0.3 MG/DL — SIGNIFICANT CHANGE UP (ref 0.2–1.2)
BUN SERPL-MCNC: 72 MG/DL — HIGH (ref 7–23)
CALCIUM SERPL-MCNC: 9.1 MG/DL — SIGNIFICANT CHANGE UP (ref 8.4–10.5)
CHLORIDE SERPL-SCNC: 98 MMOL/L — SIGNIFICANT CHANGE UP (ref 96–108)
CO2 SERPL-SCNC: 26 MMOL/L — SIGNIFICANT CHANGE UP (ref 22–31)
CREAT SERPL-MCNC: 7.46 MG/DL — HIGH (ref 0.5–1.3)
EGFR: 8 ML/MIN/1.73M2 — LOW
GAMMA GLOBULIN: 1.1 G/DL — SIGNIFICANT CHANGE UP (ref 0.6–1.6)
GLUCOSE BLDC GLUCOMTR-MCNC: 139 MG/DL — HIGH (ref 70–99)
GLUCOSE BLDC GLUCOMTR-MCNC: 151 MG/DL — HIGH (ref 70–99)
GLUCOSE BLDC GLUCOMTR-MCNC: 223 MG/DL — HIGH (ref 70–99)
GLUCOSE BLDC GLUCOMTR-MCNC: 288 MG/DL — HIGH (ref 70–99)
GLUCOSE SERPL-MCNC: 159 MG/DL — HIGH (ref 70–99)
HCT VFR BLD CALC: 24.9 % — LOW (ref 39–50)
HGB BLD-MCNC: 7.7 G/DL — LOW (ref 13–17)
INTERPRETATION SERPL IFE-IMP: SIGNIFICANT CHANGE UP
MCHC RBC-ENTMCNC: 27 PG — SIGNIFICANT CHANGE UP (ref 27–34)
MCHC RBC-ENTMCNC: 30.9 GM/DL — LOW (ref 32–36)
MCV RBC AUTO: 87.4 FL — SIGNIFICANT CHANGE UP (ref 80–100)
METHYLMALONATE SERPL-SCNC: 417 NMOL/L — HIGH (ref 0–378)
NRBC # BLD: 0 /100 WBCS — SIGNIFICANT CHANGE UP (ref 0–0)
PHOSPHATE SERPL-MCNC: 6.2 MG/DL — HIGH (ref 2.5–4.5)
PLATELET # BLD AUTO: 358 K/UL — SIGNIFICANT CHANGE UP (ref 150–400)
POTASSIUM SERPL-MCNC: 4.6 MMOL/L — SIGNIFICANT CHANGE UP (ref 3.5–5.3)
POTASSIUM SERPL-SCNC: 4.6 MMOL/L — SIGNIFICANT CHANGE UP (ref 3.5–5.3)
PROT PATTERN SERPL ELPH-IMP: SIGNIFICANT CHANGE UP
PROT SERPL-MCNC: 7.5 G/DL — SIGNIFICANT CHANGE UP (ref 6–8.3)
RBC # BLD: 2.85 M/UL — LOW (ref 4.2–5.8)
RBC # FLD: 15.2 % — HIGH (ref 10.3–14.5)
SODIUM SERPL-SCNC: 135 MMOL/L — SIGNIFICANT CHANGE UP (ref 135–145)
WBC # BLD: 8.46 K/UL — SIGNIFICANT CHANGE UP (ref 3.8–10.5)
WBC # FLD AUTO: 8.46 K/UL — SIGNIFICANT CHANGE UP (ref 3.8–10.5)

## 2023-01-03 PROCEDURE — 99232 SBSQ HOSP IP/OBS MODERATE 35: CPT

## 2023-01-03 RX ORDER — VALSARTAN 80 MG/1
40 TABLET ORAL DAILY
Refills: 0 | Status: DISCONTINUED | OUTPATIENT
Start: 2023-01-03 | End: 2023-01-05

## 2023-01-03 RX ADMIN — ATORVASTATIN CALCIUM 80 MILLIGRAM(S): 80 TABLET, FILM COATED ORAL at 21:23

## 2023-01-03 RX ADMIN — CARVEDILOL PHOSPHATE 12.5 MILLIGRAM(S): 80 CAPSULE, EXTENDED RELEASE ORAL at 21:23

## 2023-01-03 RX ADMIN — CLOPIDOGREL BISULFATE 75 MILLIGRAM(S): 75 TABLET, FILM COATED ORAL at 11:16

## 2023-01-03 RX ADMIN — INSULIN GLARGINE 12 UNIT(S): 100 INJECTION, SOLUTION SUBCUTANEOUS at 21:22

## 2023-01-03 RX ADMIN — GABAPENTIN 100 MILLIGRAM(S): 400 CAPSULE ORAL at 08:27

## 2023-01-03 RX ADMIN — CARVEDILOL PHOSPHATE 12.5 MILLIGRAM(S): 80 CAPSULE, EXTENDED RELEASE ORAL at 08:28

## 2023-01-03 RX ADMIN — FAMOTIDINE 10 MILLIGRAM(S): 10 INJECTION INTRAVENOUS at 11:16

## 2023-01-03 RX ADMIN — Medication 650 MILLIGRAM(S): at 22:35

## 2023-01-03 RX ADMIN — SENNA PLUS 2 TABLET(S): 8.6 TABLET ORAL at 21:23

## 2023-01-03 RX ADMIN — VALSARTAN 40 MILLIGRAM(S): 80 TABLET ORAL at 05:30

## 2023-01-03 RX ADMIN — ERYTHROPOIETIN 10000 UNIT(S): 10000 INJECTION, SOLUTION INTRAVENOUS; SUBCUTANEOUS at 14:51

## 2023-01-03 RX ADMIN — HEPARIN SODIUM 5000 UNIT(S): 5000 INJECTION INTRAVENOUS; SUBCUTANEOUS at 13:08

## 2023-01-03 RX ADMIN — Medication 100 MILLIGRAM(S): at 08:28

## 2023-01-03 RX ADMIN — TAMSULOSIN HYDROCHLORIDE 0.4 MILLIGRAM(S): 0.4 CAPSULE ORAL at 21:23

## 2023-01-03 RX ADMIN — POLYETHYLENE GLYCOL 3350 17 GRAM(S): 17 POWDER, FOR SOLUTION ORAL at 05:30

## 2023-01-03 RX ADMIN — HEPARIN SODIUM 5000 UNIT(S): 5000 INJECTION INTRAVENOUS; SUBCUTANEOUS at 05:30

## 2023-01-03 RX ADMIN — HEPARIN SODIUM 5000 UNIT(S): 5000 INJECTION INTRAVENOUS; SUBCUTANEOUS at 21:23

## 2023-01-03 RX ADMIN — ZINC OXIDE 1 APPLICATION(S): 200 OINTMENT TOPICAL at 11:43

## 2023-01-03 RX ADMIN — Medication 2: at 07:20

## 2023-01-03 RX ADMIN — Medication 81 MILLIGRAM(S): at 11:16

## 2023-01-03 RX ADMIN — Medication 650 MILLIGRAM(S): at 21:35

## 2023-01-03 RX ADMIN — Medication 6: at 11:14

## 2023-01-03 RX ADMIN — GABAPENTIN 100 MILLIGRAM(S): 400 CAPSULE ORAL at 17:41
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
## 2023-01-03 NOTE — PROGRESS NOTE ADULT - SUBJECTIVE AND OBJECTIVE BOX
Seen on HD    Vital Signs Last 24 Hrs  T(C): 36.8 (01-03-23 @ 17:00), Max: 37.3 (01-03-23 @ 14:00)  T(F): 98.2 (01-03-23 @ 17:00), Max: 99.1 (01-03-23 @ 14:00)  HR: 86 (01-03-23 @ 17:00) (78 - 90)  BP: 120/69 (01-03-23 @ 17:00) (116/69 - 159/80)  RR: 18 (01-03-23 @ 17:00) (16 - 18)  SpO2: 98% (01-03-23 @ 17:00) (96% - 98%)\    I&O's Detail    03 Jan 2023 07:01  -  03 Jan 2023 18:45  --------------------------------------------------------  OUT:    Other (mL): 1000 mL  Total OUT: 1000 mL    s1s2  b/l air entry  soft, ND  no edema                        7.7    8.46  )-----------( 358      ( 03 Jan 2023 06:15 )             24.9     03 Jan 2023 06:15    135    |  98     |  72     ----------------------------<  159    4.6     |  26     |  7.46     Ca    9.1        03 Jan 2023 06:15  Phos  6.2       03 Jan 2023 06:15    TPro  7.5    /  Alb  2.3    /  TBili  0.3    /  DBili  x      /  AST  15     /  ALT  16     /  AlkPhos  145    03 Jan 2023 06:15    LIVER FUNCTIONS - ( 03 Jan 2023 06:15 )  Alb: 2.3 g/dL / Pro: 7.5 g/dL / ALK PHOS: 145 U/L / ALT: 16 U/L / AST: 15 U/L / GGT: x           acetaminophen     Tablet .. 650 milliGRAM(s) Oral every 6 hours PRN  allopurinol 100 milliGRAM(s) Oral <User Schedule>  aspirin  chewable 81 milliGRAM(s) Oral daily  atorvastatin 80 milliGRAM(s) Oral at bedtime  carvedilol 12.5 milliGRAM(s) Oral <User Schedule>  clopidogrel Tablet 75 milliGRAM(s) Oral daily  dextrose 5%. 1000 milliLiter(s) IV Continuous <Continuous>  dextrose 5%. 1000 milliLiter(s) IV Continuous <Continuous>  dextrose 50% Injectable 25 Gram(s) IV Push once  dextrose 50% Injectable 12.5 Gram(s) IV Push once  dextrose 50% Injectable 25 Gram(s) IV Push once  dextrose Oral Gel 15 Gram(s) Oral once PRN  epoetin wayne-epbx (RETACRIT) Injectable 81949 Unit(s) IV Push <User Schedule>  famotidine    Tablet 10 milliGRAM(s) Oral daily  gabapentin 100 milliGRAM(s) Oral <User Schedule>  glucagon  Injectable 1 milliGRAM(s) IntraMuscular once  heparin   Injectable 5000 Unit(s) SubCutaneous every 8 hours  insulin glargine Injectable (LANTUS) 12 Unit(s) SubCutaneous at bedtime  insulin lispro (ADMELOG) corrective regimen sliding scale   SubCutaneous three times a day before meals  melatonin 3 milliGRAM(s) Oral at bedtime PRN  polyethylene glycol 3350 17 Gram(s) Oral two times a day  senna 2 Tablet(s) Oral at bedtime  tamsulosin 0.4 milliGRAM(s) Oral at bedtime  valsartan 40 milliGRAM(s) Oral daily  zinc oxide 40% Paste 1 Application(s) Topical daily    A/P:    DM, HTN, CM  S/p SDH  ESRD on HD TTS  HD today  1kg fluid removal  Anemia  Epoetin w/HD  Will follow    [phone]

## 2023-01-03 NOTE — PROGRESS NOTE ADULT - SUBJECTIVE AND OBJECTIVE BOX
Patient is a 62y old  Male who presents with a chief complaint of TBI (2023 11:25)    No events overnight    Patient seen and examined at bedside.    ALLERGIES:  No Known Drug Allergies  shellfish (Unknown)    MEDICATIONS  (STANDING):  allopurinol 100 milliGRAM(s) Oral <User Schedule>  aspirin  chewable 81 milliGRAM(s) Oral daily  atorvastatin 80 milliGRAM(s) Oral at bedtime  carvedilol 12.5 milliGRAM(s) Oral <User Schedule>  clopidogrel Tablet 75 milliGRAM(s) Oral daily  dextrose 5%. 1000 milliLiter(s) (50 mL/Hr) IV Continuous <Continuous>  dextrose 5%. 1000 milliLiter(s) (100 mL/Hr) IV Continuous <Continuous>  dextrose 50% Injectable 25 Gram(s) IV Push once  dextrose 50% Injectable 12.5 Gram(s) IV Push once  dextrose 50% Injectable 25 Gram(s) IV Push once  epoetin wayne-epbx (RETACRIT) Injectable 46846 Unit(s) IV Push <User Schedule>  famotidine    Tablet 10 milliGRAM(s) Oral daily  gabapentin 100 milliGRAM(s) Oral <User Schedule>  glucagon  Injectable 1 milliGRAM(s) IntraMuscular once  heparin   Injectable 5000 Unit(s) SubCutaneous every 8 hours  insulin glargine Injectable (LANTUS) 12 Unit(s) SubCutaneous at bedtime  insulin lispro (ADMELOG) corrective regimen sliding scale   SubCutaneous three times a day before meals  polyethylene glycol 3350 17 Gram(s) Oral two times a day  senna 2 Tablet(s) Oral at bedtime  tamsulosin 0.4 milliGRAM(s) Oral at bedtime  valsartan 40 milliGRAM(s) Oral daily  zinc oxide 40% Paste 1 Application(s) Topical daily    MEDICATIONS  (PRN):  acetaminophen     Tablet .. 650 milliGRAM(s) Oral every 6 hours PRN Temp greater or equal to 38C (100.4F)  dextrose Oral Gel 15 Gram(s) Oral once PRN Blood Glucose LESS THAN 70 milliGRAM(s)/deciliter  melatonin 3 milliGRAM(s) Oral at bedtime PRN Insomnia    Vital Signs Last 24 Hrs  T(F): 99.1 (2023 14:00), Max: 99.1 (2023 14:00)  HR: 82 (2023 14:00) (78 - 90)  BP: 120/59 (2023 14:00) (116/69 - 159/80)  RR: 18 (2023 14:00) (16 - 18)  SpO2: 96% (2023 14:00) (96% - 98%)  I&O's Summary    2023 07:01  -  2023 07:00  --------------------------------------------------------  IN: 0 mL / OUT: 1 mL / NET: -1 mL      BMI (kg/m2): 27.3 (23 @ 13:34)  PHYSICAL EXAM:  General: NAD, A/O x 3  ENT: MMM, no scleral icterus  Neck: Supple, No JVD, no thyroidomegaly  Lungs: Clear to auscultation bilaterally, no wheezes, no rales, no rhonchi, good inspiratory effort  Cardio: RRR, S1/S2, No murmurs  Abdomen: Soft, Nontender, Nondistended; Bowel sounds present  Extremities: No calf tenderness, No pitting edema, no skin changes    LABS:                        7.7    8.46  )-----------( 358      ( 2023 06:15 )             24.9           135  |  98  |  72  ----------------------------<  159  4.6   |  26  |  7.46    Ca    9.1      2023 06:15  Phos  6.2         TPro  7.5  /  Alb  2.3  /  TBili  0.3  /  DBili  x   /  AST  15  /  ALT  16  /  AlkPhos  145       POCT Blood Glucose.: 288 mg/dL (2023 11:11)  POCT Blood Glucose.: 151 mg/dL (2023 07:20)  POCT Blood Glucose.: 224 mg/dL (2023 21:19)  POCT Blood Glucose.: 196 mg/dL (2023 16:41)      Urinalysis Basic - ( 31 Dec 2022 22:00 )    Color: Brown / Appearance: Clear / S.020 / pH: x  Gluc: x / Ketone: Trace  / Bili: Small / Urobili: Negative   Blood: x / Protein: 100 / Nitrite: Negative   Leuk Esterase: Negative / RBC: 0-4 /HPF / WBC Negative /HPF   Sq Epi: x / Non Sq Epi: Neg.-Few / Bacteria: Negative /HPF        COVID-19 PCR: NotDetec (22 @ 09:48)  COVID-19 PCR: NotDetec (22 @ 23:02)  COVID-19 PCR: Detected (22 @ 10:37)  COVID-19 PCR: NotDetec (22 @ 22:30)  COVID-19 PCR: Detected (22 @ 15:17)  COVID-19 PCR: NotDetec (22 @ 09:48)  COVID-19 PCR: NotDetec (22 @ 23:02)  COVID-19 PCR: Detected (22 @ 10:37)  COVID-19 PCR: NotDetec (22 @ 22:30)  COVID-19 PCR: Detected (22 @ 15:17)  COVID-19 PCR: NotDetec (22 @ 19:18)  COVID-19 PCR: Detected (22 @ 17:22)  COVID-19 PCR: NotDetec (22 @ 08:08)  COVID-19 PCR: NotDetec (22 @ 07:23)  COVID-19 PCR: NotDetec (10-27-22 @ 09:26)
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
## 2023-01-04 LAB
ABO RH CONFIRMATION: SIGNIFICANT CHANGE UP
BLD GP AB SCN SERPL QL: SIGNIFICANT CHANGE UP
COPPER SERPL-MCNC: 144 UG/DL — HIGH (ref 69–132)
GLUCOSE BLDC GLUCOMTR-MCNC: 126 MG/DL — HIGH (ref 70–99)
GLUCOSE BLDC GLUCOMTR-MCNC: 185 MG/DL — HIGH (ref 70–99)
GLUCOSE BLDC GLUCOMTR-MCNC: 220 MG/DL — HIGH (ref 70–99)
HCT VFR BLD CALC: 23.4 % — LOW (ref 39–50)
HGB BLD-MCNC: 7.3 G/DL — LOW (ref 13–17)
MCHC RBC-ENTMCNC: 27.4 PG — SIGNIFICANT CHANGE UP (ref 27–34)
MCHC RBC-ENTMCNC: 31.2 GM/DL — LOW (ref 32–36)
MCV RBC AUTO: 88 FL — SIGNIFICANT CHANGE UP (ref 80–100)
NRBC # BLD: 0 /100 WBCS — SIGNIFICANT CHANGE UP (ref 0–0)
OB PNL STL: NEGATIVE — SIGNIFICANT CHANGE UP
PLATELET # BLD AUTO: 348 K/UL — SIGNIFICANT CHANGE UP (ref 150–400)
RBC # BLD: 2.66 M/UL — LOW (ref 4.2–5.8)
RBC # FLD: 15 % — HIGH (ref 10.3–14.5)
WBC # BLD: 8.44 K/UL — SIGNIFICANT CHANGE UP (ref 3.8–10.5)
WBC # FLD AUTO: 8.44 K/UL — SIGNIFICANT CHANGE UP (ref 3.8–10.5)

## 2023-01-04 PROCEDURE — 90832 PSYTX W PT 30 MINUTES: CPT

## 2023-01-04 PROCEDURE — 99232 SBSQ HOSP IP/OBS MODERATE 35: CPT

## 2023-01-04 RX ORDER — GABAPENTIN 400 MG/1
100 CAPSULE ORAL DAILY
Refills: 0 | Status: DISCONTINUED | OUTPATIENT
Start: 2023-01-04 | End: 2023-01-10

## 2023-01-04 RX ORDER — GABAPENTIN 400 MG/1
200 CAPSULE ORAL AT BEDTIME
Refills: 0 | Status: DISCONTINUED | OUTPATIENT
Start: 2023-01-04 | End: 2023-01-06

## 2023-01-04 RX ORDER — INSULIN LISPRO 100/ML
VIAL (ML) SUBCUTANEOUS
Refills: 0 | Status: DISCONTINUED | OUTPATIENT
Start: 2023-01-04 | End: 2023-01-08

## 2023-01-04 RX ORDER — CHLORHEXIDINE GLUCONATE 213 G/1000ML
1 SOLUTION TOPICAL DAILY
Refills: 0 | Status: DISCONTINUED | OUTPATIENT
Start: 2023-01-04 | End: 2023-01-27

## 2023-01-04 RX ADMIN — Medication 100 MILLIGRAM(S): at 08:07

## 2023-01-04 RX ADMIN — CARVEDILOL PHOSPHATE 12.5 MILLIGRAM(S): 80 CAPSULE, EXTENDED RELEASE ORAL at 21:26

## 2023-01-04 RX ADMIN — CLOPIDOGREL BISULFATE 75 MILLIGRAM(S): 75 TABLET, FILM COATED ORAL at 12:17

## 2023-01-04 RX ADMIN — FAMOTIDINE 10 MILLIGRAM(S): 10 INJECTION INTRAVENOUS at 12:18

## 2023-01-04 RX ADMIN — GABAPENTIN 100 MILLIGRAM(S): 400 CAPSULE ORAL at 16:38

## 2023-01-04 RX ADMIN — POLYETHYLENE GLYCOL 3350 17 GRAM(S): 17 POWDER, FOR SOLUTION ORAL at 05:40

## 2023-01-04 RX ADMIN — Medication 4: at 16:41

## 2023-01-04 RX ADMIN — HEPARIN SODIUM 5000 UNIT(S): 5000 INJECTION INTRAVENOUS; SUBCUTANEOUS at 21:25

## 2023-01-04 RX ADMIN — GABAPENTIN 100 MILLIGRAM(S): 400 CAPSULE ORAL at 08:09

## 2023-01-04 RX ADMIN — INSULIN GLARGINE 12 UNIT(S): 100 INJECTION, SOLUTION SUBCUTANEOUS at 21:25

## 2023-01-04 RX ADMIN — GABAPENTIN 200 MILLIGRAM(S): 400 CAPSULE ORAL at 21:27

## 2023-01-04 RX ADMIN — HEPARIN SODIUM 5000 UNIT(S): 5000 INJECTION INTRAVENOUS; SUBCUTANEOUS at 13:33

## 2023-01-04 RX ADMIN — CARVEDILOL PHOSPHATE 12.5 MILLIGRAM(S): 80 CAPSULE, EXTENDED RELEASE ORAL at 08:07

## 2023-01-04 RX ADMIN — Medication 81 MILLIGRAM(S): at 12:18

## 2023-01-04 RX ADMIN — ZINC OXIDE 1 APPLICATION(S): 200 OINTMENT TOPICAL at 17:27

## 2023-01-04 RX ADMIN — ATORVASTATIN CALCIUM 80 MILLIGRAM(S): 80 TABLET, FILM COATED ORAL at 21:26

## 2023-01-04 RX ADMIN — HEPARIN SODIUM 5000 UNIT(S): 5000 INJECTION INTRAVENOUS; SUBCUTANEOUS at 05:40

## 2023-01-04 RX ADMIN — TAMSULOSIN HYDROCHLORIDE 0.4 MILLIGRAM(S): 0.4 CAPSULE ORAL at 21:26

## 2023-01-04 RX ADMIN — VALSARTAN 40 MILLIGRAM(S): 80 TABLET ORAL at 05:40

## 2023-01-04 RX ADMIN — SENNA PLUS 2 TABLET(S): 8.6 TABLET ORAL at 21:27

## 2023-01-04 NOTE — PROGRESS NOTE ADULT - SUBJECTIVE AND OBJECTIVE BOX
Patient is a 62y old  Male who presents with a chief complaint of TBI (03 Jan 2023 11:25)    This AM, patient was confused, smeared feces all over himself  Had dialysis yesterday  Patient seen and examined at bedside.    ALLERGIES:  No Known Drug Allergies  shellfish (Unknown)    MEDICATIONS  (STANDING):  allopurinol 100 milliGRAM(s) Oral <User Schedule>  aspirin  chewable 81 milliGRAM(s) Oral daily  atorvastatin 80 milliGRAM(s) Oral at bedtime  carvedilol 12.5 milliGRAM(s) Oral <User Schedule>  clopidogrel Tablet 75 milliGRAM(s) Oral daily  dextrose 5%. 1000 milliLiter(s) (50 mL/Hr) IV Continuous <Continuous>  dextrose 5%. 1000 milliLiter(s) (100 mL/Hr) IV Continuous <Continuous>  dextrose 50% Injectable 25 Gram(s) IV Push once  dextrose 50% Injectable 12.5 Gram(s) IV Push once  dextrose 50% Injectable 25 Gram(s) IV Push once  epoetin wayne-epbx (RETACRIT) Injectable 52926 Unit(s) IV Push <User Schedule>  famotidine    Tablet 10 milliGRAM(s) Oral daily  gabapentin 100 milliGRAM(s) Oral <User Schedule>  glucagon  Injectable 1 milliGRAM(s) IntraMuscular once  heparin   Injectable 5000 Unit(s) SubCutaneous every 8 hours  insulin glargine Injectable (LANTUS) 12 Unit(s) SubCutaneous at bedtime  insulin lispro (ADMELOG) corrective regimen sliding scale   SubCutaneous three times a day before meals  polyethylene glycol 3350 17 Gram(s) Oral two times a day  senna 2 Tablet(s) Oral at bedtime  tamsulosin 0.4 milliGRAM(s) Oral at bedtime  valsartan 40 milliGRAM(s) Oral daily  zinc oxide 40% Paste 1 Application(s) Topical daily    MEDICATIONS  (PRN):  acetaminophen     Tablet .. 650 milliGRAM(s) Oral every 6 hours PRN Temp greater or equal to 38C (100.4F)  dextrose Oral Gel 15 Gram(s) Oral once PRN Blood Glucose LESS THAN 70 milliGRAM(s)/deciliter  melatonin 3 milliGRAM(s) Oral at bedtime PRN Insomnia    Vital Signs Last 24 Hrs  T(F): 98.5 (03 Jan 2023 19:27), Max: 99.1 (03 Jan 2023 14:00)  HR: 97 (04 Jan 2023 05:38) (78 - 97)  BP: 162/83 (04 Jan 2023 05:38) (120/59 - 162/83)  RR: 15 (03 Jan 2023 19:27) (15 - 18)  SpO2: 97% (03 Jan 2023 19:27) (96% - 98%)  I&O's Summary    03 Jan 2023 07:01  -  04 Jan 2023 07:00  --------------------------------------------------------  IN: 0 mL / OUT: 1000 mL / NET: -1000 mL      BMI (kg/m2): 27.3 (01-03-23 @ 13:34)  PHYSICAL EXAM:  General: NAD, A/O x 2, able to follow commands, had feces all over his clothes  ENT: MMM, no scleral icterus  Neck: Supple, No JVD, no thyroidomegaly  Lungs: Clear to auscultation bilaterally, no wheezes, no rales, no rhonchi, good inspiratory effort  Cardio: RRR, S1/S2, No murmurs  Abdomen: Soft, Nontender, Nondistended; Bowel sounds present  Extremities: No calf tenderness, No pitting edema, no skin changes    LABS:                        7.3    8.44  )-----------( 348      ( 04 Jan 2023 05:42 )             23.4       01-03    135  |  98  |  72  ----------------------------<  159  4.6   |  26  |  7.46    Ca    9.1      03 Jan 2023 06:15  Phos  6.2     01-03    TPro  7.5  /  Alb  2.3  /  TBili  0.3  /  DBili  x   /  AST  15  /  ALT  16  /  AlkPhos  145  01-03     POCT Blood Glucose.: 126 mg/dL (04 Jan 2023 08:00)  POCT Blood Glucose.: 223 mg/dL (03 Jan 2023 21:18)  POCT Blood Glucose.: 139 mg/dL (03 Jan 2023 17:37)    COVID-19 PCR: NotDetec (12-30-22 @ 09:48)  COVID-19 PCR: NotDetec (12-25-22 @ 23:02)  COVID-19 PCR: Detected (12-25-22 @ 10:37)  COVID-19 PCR: NotDetec (12-21-22 @ 22:30)  COVID-19 PCR: Detected (12-21-22 @ 15:17)  COVID-19 PCR: NotDetec (12-30-22 @ 09:48)  COVID-19 PCR: NotDetec (12-25-22 @ 23:02)  COVID-19 PCR: Detected (12-25-22 @ 10:37)  COVID-19 PCR: NotDetec (12-21-22 @ 22:30)  COVID-19 PCR: Detected (12-21-22 @ 15:17)  COVID-19 PCR: NotDetec (12-12-22 @ 19:18)  COVID-19 PCR: Detected (12-12-22 @ 17:22)  COVID-19 PCR: NotDetec (11-06-22 @ 08:08)  COVID-19 PCR: NotDetec (11-02-22 @ 07:23)  COVID-19 PCR: NotDetec (10-27-22 @ 09:26)
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
## 2023-01-04 NOTE — PROGRESS NOTE ADULT - SUBJECTIVE AND OBJECTIVE BOX
Resting    Vital Signs Last 24 Hrs  T(C): 36.8 (01-04-23 @ 19:53), Max: 36.8 (01-04-23 @ 19:53)  T(F): 98.2 (01-04-23 @ 19:53), Max: 98.2 (01-04-23 @ 19:53)  HR: 91 (01-04-23 @ 22:24) (87 - 97)  BP: 156/85 (01-04-23 @ 22:24) (154/73 - 170/79)  RR: 15 (01-04-23 @ 19:53) (15 - 15)  SpO2: 97% (01-04-23 @ 19:53) (97% - 97%)    I&O's Detail    03 Jan 2023 07:01  -  04 Jan 2023 07:00  --------------------------------------------------------  OUT:    Other (mL): 1000 mL  Total OUT: 1000 mL    s1s2  b/l air entry  soft, ND  no edema                               7.3    8.44  )-----------( 348      ( 04 Jan 2023 05:42 )             23.4     03 Jan 2023 06:15    135    |  98     |  72     ----------------------------<  159    4.6     |  26     |  7.46     Ca    9.1        03 Jan 2023 06:15  Phos  6.2       03 Jan 2023 06:15    TPro  7.5    /  Alb  2.3    /  TBili  0.3    /  DBili  x      /  AST  15     /  ALT  16     /  AlkPhos  145    03 Jan 2023 06:15    LIVER FUNCTIONS - ( 03 Jan 2023 06:15 )  Alb: 2.3 g/dL / Pro: 7.5 g/dL / ALK PHOS: 145 U/L / ALT: 16 U/L / AST: 15 U/L / GGT: x           acetaminophen     Tablet .. 650 milliGRAM(s) Oral every 6 hours PRN  allopurinol 100 milliGRAM(s) Oral <User Schedule>  aspirin  chewable 81 milliGRAM(s) Oral daily  atorvastatin 80 milliGRAM(s) Oral at bedtime  carvedilol 12.5 milliGRAM(s) Oral <User Schedule>  clopidogrel Tablet 75 milliGRAM(s) Oral daily  dextrose 5%. 1000 milliLiter(s) IV Continuous <Continuous>  dextrose 5%. 1000 milliLiter(s) IV Continuous <Continuous>  dextrose 50% Injectable 25 Gram(s) IV Push once  dextrose 50% Injectable 12.5 Gram(s) IV Push once  dextrose 50% Injectable 25 Gram(s) IV Push once  dextrose Oral Gel 15 Gram(s) Oral once PRN  epoetin wayne-epbx (RETACRIT) Injectable 84174 Unit(s) IV Push <User Schedule>  famotidine    Tablet 10 milliGRAM(s) Oral daily  gabapentin 100 milliGRAM(s) Oral <User Schedule>  gabapentin 200 milliGRAM(s) Oral at bedtime  glucagon  Injectable 1 milliGRAM(s) IntraMuscular once  heparin   Injectable 5000 Unit(s) SubCutaneous every 8 hours  insulin glargine Injectable (LANTUS) 12 Unit(s) SubCutaneous at bedtime  insulin lispro (ADMELOG) corrective regimen sliding scale   SubCutaneous two times a day before meals  melatonin 3 milliGRAM(s) Oral at bedtime PRN  polyethylene glycol 3350 17 Gram(s) Oral two times a day  senna 2 Tablet(s) Oral at bedtime  tamsulosin 0.4 milliGRAM(s) Oral at bedtime  valsartan 40 milliGRAM(s) Oral daily  zinc oxide 40% Paste 1 Application(s) Topical daily    A/P:    DM, HTN, CM  S/p SDH  ESRD on HD TTS  HD tomorrow  1-2 kg fluid removal  Anemia  Bld tx 1u w/HD  Epoetin w/HD  Will follow    [phone]
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
## 2023-01-04 NOTE — DIETITIAN INITIAL EVALUATION ADULT - ORAL INTAKE PTA/DIET HISTORY
Patient transferred from [facility]. Tolerated a Minced & Moist + Consistent CHO Diet. Received HD 3x/week. Per previous RD noted on 12/25/22 at [facility]: Post-HD weights: 158.7lbs (12/17), 158lbs (12/19), 161.5lbs (12/21) 149.9lb (12/23).

## 2023-01-04 NOTE — DIETITIAN INITIAL EVALUATION ADULT - ADD RECOMMEND
1. Recommend Minced & Moist, Consistent CHO + Renal diet  2. Double protein portions at meals 2/2 hemodialysis  3. Monitor PO intakes and consider Nepro ONS if not meeting EER  4. Provide ongoing diet education as needed  5. Monitor weights pre/post HD sessions

## 2023-01-04 NOTE — DIETITIAN INITIAL EVALUATION ADULT - NSFNSPHYEXAMSKINFT_GEN_A_CORE
Pressure Injury 1: none, none  Pressure Injury 2: Bilateral:,buttocks, Stage II  Pressure Injury 3: none, none  Pressure Injury 4: none, none  Pressure Injury 5: none, none  Pressure Injury 6: none, none  Pressure Injury 7: none, none  Pressure Injury 8: none, none  Pressure Injury 9: none, none  Pressure Injury 10: none, none  Pressure Injury 11: none, none, Pressure Injury 1: none, Suspected deep tissue injury  Pressure Injury 2: Bilateral:,buttock , Stage II  Pressure Injury 3: none, none  Pressure Injury 4: none, none  Pressure Injury 5: none, none  Pressure Injury 6: none, none  Pressure Injury 7: none, none  Pressure Injury 8: none, none  Pressure Injury 9: none, none  Pressure Injury 10: none, none  Pressure Injury 11: none, none

## 2023-01-04 NOTE — DIETITIAN INITIAL EVALUATION ADULT - PERTINENT LABORATORY DATA
01-03    135  |  98  |  72<H>  ----------------------------<  159<H>  4.6   |  26  |  7.46<H>    Ca    9.1      03 Jan 2023 06:15  Phos  6.2     01-03    TPro  7.5  /  Alb  2.3<L>  /  TBili  0.3  /  DBili  x   /  AST  15  /  ALT  16  /  AlkPhos  145<H>  01-03  POCT Blood Glucose.: 220 mg/dL (01-04-23 @ 16:32)  A1C with Estimated Average Glucose Result: 7.9 % (01-01-23 @ 06:15)  A1C with Estimated Average Glucose Result: 11.8 % (10-23-22 @ 14:08)
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
## 2023-01-04 NOTE — PROGRESS NOTE ADULT - ASSESSMENT
Pt inconsistently alert, fluctuating attention, apparently intact receptive/expressive language, thought processes goal-directed, no abnormal thought contents noted, affect blunted, euthymic mood, denied AH/VH, denied SI/HI/I/P, mild restlessness. Plan: Continue the assessment process.
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
## 2023-01-05 LAB
ANION GAP SERPL CALC-SCNC: 9 MMOL/L — SIGNIFICANT CHANGE UP (ref 5–17)
BUN SERPL-MCNC: 50 MG/DL — HIGH (ref 7–23)
CALCIUM SERPL-MCNC: 8.4 MG/DL — SIGNIFICANT CHANGE UP (ref 8.4–10.5)
CHLORIDE SERPL-SCNC: 96 MMOL/L — SIGNIFICANT CHANGE UP (ref 96–108)
CO2 SERPL-SCNC: 28 MMOL/L — SIGNIFICANT CHANGE UP (ref 22–31)
CREAT SERPL-MCNC: 5.23 MG/DL — HIGH (ref 0.5–1.3)
EGFR: 12 ML/MIN/1.73M2 — LOW
GLUCOSE BLDC GLUCOMTR-MCNC: 126 MG/DL — HIGH (ref 70–99)
GLUCOSE BLDC GLUCOMTR-MCNC: 157 MG/DL — HIGH (ref 70–99)
GLUCOSE BLDC GLUCOMTR-MCNC: 223 MG/DL — HIGH (ref 70–99)
GLUCOSE SERPL-MCNC: 260 MG/DL — HIGH (ref 70–99)
HCT VFR BLD CALC: 20.7 % — CRITICAL LOW (ref 39–50)
HGB BLD-MCNC: 6.5 G/DL — CRITICAL LOW (ref 13–17)
MCHC RBC-ENTMCNC: 27.3 PG — SIGNIFICANT CHANGE UP (ref 27–34)
MCHC RBC-ENTMCNC: 31.4 GM/DL — LOW (ref 32–36)
MCV RBC AUTO: 87 FL — SIGNIFICANT CHANGE UP (ref 80–100)
NRBC # BLD: 0 /100 WBCS — SIGNIFICANT CHANGE UP (ref 0–0)
PHOSPHATE SERPL-MCNC: 4.3 MG/DL — SIGNIFICANT CHANGE UP (ref 2.5–4.5)
PLATELET # BLD AUTO: 343 K/UL — SIGNIFICANT CHANGE UP (ref 150–400)
POTASSIUM SERPL-MCNC: 4 MMOL/L — SIGNIFICANT CHANGE UP (ref 3.5–5.3)
POTASSIUM SERPL-SCNC: 4 MMOL/L — SIGNIFICANT CHANGE UP (ref 3.5–5.3)
PYRIDOXAL PHOS SERPL-MCNC: 1.8 UG/L — LOW (ref 3.4–65.2)
RBC # BLD: 2.38 M/UL — LOW (ref 4.2–5.8)
RBC # FLD: 15.4 % — HIGH (ref 10.3–14.5)
SODIUM SERPL-SCNC: 133 MMOL/L — LOW (ref 135–145)
WBC # BLD: 5.99 K/UL — SIGNIFICANT CHANGE UP (ref 3.8–10.5)
WBC # FLD AUTO: 5.99 K/UL — SIGNIFICANT CHANGE UP (ref 3.8–10.5)

## 2023-01-05 PROCEDURE — 99232 SBSQ HOSP IP/OBS MODERATE 35: CPT

## 2023-01-05 RX ORDER — ACETAMINOPHEN 500 MG
650 TABLET ORAL EVERY 6 HOURS
Refills: 0 | Status: DISCONTINUED | OUTPATIENT
Start: 2023-01-05 | End: 2023-01-27

## 2023-01-05 RX ORDER — CARVEDILOL PHOSPHATE 80 MG/1
3.12 CAPSULE, EXTENDED RELEASE ORAL EVERY 12 HOURS
Refills: 0 | Status: DISCONTINUED | OUTPATIENT
Start: 2023-01-05 | End: 2023-01-18

## 2023-01-05 RX ADMIN — INSULIN GLARGINE 12 UNIT(S): 100 INJECTION, SOLUTION SUBCUTANEOUS at 21:41

## 2023-01-05 RX ADMIN — ATORVASTATIN CALCIUM 80 MILLIGRAM(S): 80 TABLET, FILM COATED ORAL at 21:42

## 2023-01-05 RX ADMIN — POLYETHYLENE GLYCOL 3350 17 GRAM(S): 17 POWDER, FOR SOLUTION ORAL at 05:30

## 2023-01-05 RX ADMIN — Medication 100 MILLIGRAM(S): at 09:10

## 2023-01-05 RX ADMIN — ERYTHROPOIETIN 10000 UNIT(S): 10000 INJECTION, SOLUTION INTRAVENOUS; SUBCUTANEOUS at 15:19

## 2023-01-05 RX ADMIN — HEPARIN SODIUM 5000 UNIT(S): 5000 INJECTION INTRAVENOUS; SUBCUTANEOUS at 21:41

## 2023-01-05 RX ADMIN — TAMSULOSIN HYDROCHLORIDE 0.4 MILLIGRAM(S): 0.4 CAPSULE ORAL at 21:42

## 2023-01-05 RX ADMIN — Medication 650 MILLIGRAM(S): at 15:19

## 2023-01-05 RX ADMIN — FAMOTIDINE 10 MILLIGRAM(S): 10 INJECTION INTRAVENOUS at 11:26

## 2023-01-05 RX ADMIN — Medication 2: at 17:46

## 2023-01-05 RX ADMIN — HEPARIN SODIUM 5000 UNIT(S): 5000 INJECTION INTRAVENOUS; SUBCUTANEOUS at 05:29

## 2023-01-05 RX ADMIN — GABAPENTIN 100 MILLIGRAM(S): 400 CAPSULE ORAL at 11:24

## 2023-01-05 RX ADMIN — CHLORHEXIDINE GLUCONATE 1 APPLICATION(S): 213 SOLUTION TOPICAL at 11:27

## 2023-01-05 RX ADMIN — VALSARTAN 40 MILLIGRAM(S): 80 TABLET ORAL at 05:29

## 2023-01-05 RX ADMIN — GABAPENTIN 200 MILLIGRAM(S): 400 CAPSULE ORAL at 21:41

## 2023-01-05 RX ADMIN — ZINC OXIDE 1 APPLICATION(S): 200 OINTMENT TOPICAL at 11:27

## 2023-01-05 RX ADMIN — CARVEDILOL PHOSPHATE 3.12 MILLIGRAM(S): 80 CAPSULE, EXTENDED RELEASE ORAL at 17:47

## 2023-01-05 RX ADMIN — SENNA PLUS 2 TABLET(S): 8.6 TABLET ORAL at 21:42

## 2023-01-05 RX ADMIN — CLOPIDOGREL BISULFATE 75 MILLIGRAM(S): 75 TABLET, FILM COATED ORAL at 11:26

## 2023-01-05 RX ADMIN — Medication 81 MILLIGRAM(S): at 11:25

## 2023-01-05 RX ADMIN — HEPARIN SODIUM 5000 UNIT(S): 5000 INJECTION INTRAVENOUS; SUBCUTANEOUS at 13:27
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
## 2023-01-05 NOTE — PROVIDER CONTACT NOTE (CRITICAL VALUE NOTIFICATION) - BACKGROUND
Pt on HD a T/Th/Sat.  Due to receive a unit of PRBC in HD today. Currently in HD now
ESRD with hemodialysis pt.

## 2023-01-05 NOTE — PROGRESS NOTE ADULT - SUBJECTIVE AND OBJECTIVE BOX
Seen on HD    Vital Signs Last 24 Hrs  T(C): 36.9 (01-05-23 @ 19:16), Max: 36.9 (01-05-23 @ 19:16)  T(F): 98.4 (01-05-23 @ 19:16), Max: 98.4 (01-05-23 @ 19:16)  HR: 78 (01-05-23 @ 19:16) (78 - 96)  BP: 156/85 (01-05-23 @ 19:16) (88/51 - 170/79)  RR: 15 (01-05-23 @ 19:16) (15 - 17)  SpO2: 100% (01-05-23 @ 19:16) (98% - 100%)    I&O's Detail    05 Jan 2023 07:01  -  05 Jan 2023 21:02  --------------------------------------------------------  IN:    Other (mL): 1100 mL  Total IN: 1100 mL    OUT:    Other (mL): 2100 mL  Total OUT: 2100 mL    Total NET: -1000 mL    s1s2  b/l air entry  soft, ND  no edema                                        6.5    5.99  )-----------( 343      ( 05 Jan 2023 14:10 )             20.7     05 Jan 2023 14:10    133    |  96     |  50     ----------------------------<  260    4.0     |  28     |  5.23     Ca    8.4        05 Jan 2023 14:10  Phos  4.3       05 Jan 2023 14:10    acetaminophen     Tablet .. 650 milliGRAM(s) Oral every 6 hours PRN  allopurinol 100 milliGRAM(s) Oral <User Schedule>  aspirin  chewable 81 milliGRAM(s) Oral daily  atorvastatin 80 milliGRAM(s) Oral at bedtime  carvedilol 3.125 milliGRAM(s) Oral every 12 hours  chlorhexidine 2% Cloths 1 Application(s) Topical daily  clopidogrel Tablet 75 milliGRAM(s) Oral daily  dextrose 5%. 1000 milliLiter(s) IV Continuous <Continuous>  dextrose 5%. 1000 milliLiter(s) IV Continuous <Continuous>  dextrose 50% Injectable 25 Gram(s) IV Push once  dextrose 50% Injectable 12.5 Gram(s) IV Push once  dextrose 50% Injectable 25 Gram(s) IV Push once  dextrose Oral Gel 15 Gram(s) Oral once PRN  epoetin wayne-epbx (RETACRIT) Injectable 28176 Unit(s) IV Push <User Schedule>  famotidine    Tablet 10 milliGRAM(s) Oral daily  gabapentin 200 milliGRAM(s) Oral at bedtime  gabapentin 100 milliGRAM(s) Oral daily  glucagon  Injectable 1 milliGRAM(s) IntraMuscular once  heparin   Injectable 5000 Unit(s) SubCutaneous every 8 hours  insulin glargine Injectable (LANTUS) 12 Unit(s) SubCutaneous at bedtime  insulin lispro (ADMELOG) corrective regimen sliding scale   SubCutaneous two times a day before meals  melatonin 3 milliGRAM(s) Oral at bedtime PRN  polyethylene glycol 3350 17 Gram(s) Oral two times a day  senna 2 Tablet(s) Oral at bedtime  tamsulosin 0.4 milliGRAM(s) Oral at bedtime  zinc oxide 40% Paste 1 Application(s) Topical daily    A/P:    DM, HTN, CM  S/p SDH  ESRD on HD TTS  HD today  2 kg fluid removal w/HD today  Bld tx w/HD today  Anemia w/up per primary team  Epoetin w/HD  F/u CBC    [phone]

## 2023-01-05 NOTE — CHART NOTE - NSCHARTNOTEFT_GEN_A_CORE
NUTRITION FOLLOW UP    SOURCE: Patient [X)   Family [X]    Medical Record (X)    Diet, Minced and Moist:   Consistent Carbohydrate {No Snacks}  DASH/TLC {Sodium & Cholesterol Restricted}  For patients receiving Renal Replacement - No Protein Restr, No Conc K, No Conc Phos, Low Sodium (01-04-23 @ 22:48) [Active]    Pt continues on minced and moist diet- observed with fair/good intake at bfast this morning with assistance from family member. PO intake % at meals. Pt declines Nepro supplements, states that he dislikes. Food preferences obtained to optimize intake- receptive to yogurt with all meals, 2x protein portions.  Denies GI distress, last BM 1/4.      CURRENT WEIGHT:  156.7lbs (1/3)     PERTINENT MEDS:   Pertinent Medications: MEDICATIONS  (STANDING):  allopurinol 100 milliGRAM(s) Oral <User Schedule>  aspirin  chewable 81 milliGRAM(s) Oral daily  atorvastatin 80 milliGRAM(s) Oral at bedtime  carvedilol 12.5 milliGRAM(s) Oral <User Schedule>  chlorhexidine 2% Cloths 1 Application(s) Topical daily  clopidogrel Tablet 75 milliGRAM(s) Oral daily  dextrose 5%. 1000 milliLiter(s) (50 mL/Hr) IV Continuous <Continuous>  dextrose 5%. 1000 milliLiter(s) (100 mL/Hr) IV Continuous <Continuous>  dextrose 50% Injectable 25 Gram(s) IV Push once  dextrose 50% Injectable 12.5 Gram(s) IV Push once  dextrose 50% Injectable 25 Gram(s) IV Push once  epoetin wayne-epbx (RETACRIT) Injectable 31964 Unit(s) IV Push <User Schedule>  famotidine    Tablet 10 milliGRAM(s) Oral daily  gabapentin 100 milliGRAM(s) Oral daily  gabapentin 200 milliGRAM(s) Oral at bedtime  glucagon  Injectable 1 milliGRAM(s) IntraMuscular once  heparin   Injectable 5000 Unit(s) SubCutaneous every 8 hours  insulin glargine Injectable (LANTUS) 12 Unit(s) SubCutaneous at bedtime  insulin lispro (ADMELOG) corrective regimen sliding scale   SubCutaneous two times a day before meals  polyethylene glycol 3350 17 Gram(s) Oral two times a day  senna 2 Tablet(s) Oral at bedtime  tamsulosin 0.4 milliGRAM(s) Oral at bedtime  valsartan 40 milliGRAM(s) Oral daily  zinc oxide 40% Paste 1 Application(s) Topical daily    MEDICATIONS  (PRN):  acetaminophen     Tablet .. 650 milliGRAM(s) Oral every 6 hours PRN Temp greater or equal to 38C (100.4F)  dextrose Oral Gel 15 Gram(s) Oral once PRN Blood Glucose LESS THAN 70 milliGRAM(s)/deciliter  melatonin 3 milliGRAM(s) Oral at bedtime PRN Insomnia      PERTINENT LABS:  01-03 Na135 mmol/L Glu 159 mg/dL<H> K+ 4.6 mmol/L Cr  7.46 mg/dL<H> BUN 72 mg/dL<H> 01-03 Phos 6.2 mg/dL<H> 01-03 Alb 2.3 g/dL<L>    CAPILLARY BLOOD GLUCOSE      POCT Blood Glucose.: 126 mg/dL (05 Jan 2023 07:10)  POCT Blood Glucose.: 185 mg/dL (04 Jan 2023 21:22)  POCT Blood Glucose.: 220 mg/dL (04 Jan 2023 16:32)      SKIN:  stage II sacrum, left heel DTI  EDEMA: none  LAST BM: 1/4    ESTIMATED NEEDS:   [X] no change since previous assessment  [ ] recalculated:     PREVIOUS NUTRITION DIAGNOSIS:    1. Altered nutrition related lab values    NUTRITION DIAGNOSIS is :  (X)  Ongoing          NEW NUTRITION DIAGNOSIS: Increased nutrient needs related to increased demand for wound healing as evidenced by stage II pressure ulcer, DTI left heel, +HD    NUTRITION RECOMMENDATIONS:   1. Recommend adding Nephrovite & Vitamin C to meet increased needs  2. yogurt TID/2x protein at meals  3. Diet consistency per speech therapy. Assistance with meals.   4. Obtain and honor food preferences as able     MONITORING AND EVALUATION:   1. Tolerance to diet prescription   2. PO intake  3. Weights  4. Labs  5. Follow Up per protocol     RD to remain available   Faina Perez RDN

## 2023-01-06 LAB
FOLATE SERPL-MCNC: 3.9 NG/ML — LOW
GLUCOSE BLDC GLUCOMTR-MCNC: 123 MG/DL — HIGH (ref 70–99)
GLUCOSE BLDC GLUCOMTR-MCNC: 155 MG/DL — HIGH (ref 70–99)
GLUCOSE BLDC GLUCOMTR-MCNC: 195 MG/DL — HIGH (ref 70–99)
HCT VFR BLD CALC: 25.4 % — LOW (ref 39–50)
HGB BLD-MCNC: 8.1 G/DL — LOW (ref 13–17)
MCHC RBC-ENTMCNC: 27.1 PG — SIGNIFICANT CHANGE UP (ref 27–34)
MCHC RBC-ENTMCNC: 31.9 GM/DL — LOW (ref 32–36)
MCV RBC AUTO: 84.9 FL — SIGNIFICANT CHANGE UP (ref 80–100)
NRBC # BLD: 0 /100 WBCS — SIGNIFICANT CHANGE UP (ref 0–0)
PLATELET # BLD AUTO: 333 K/UL — SIGNIFICANT CHANGE UP (ref 150–400)
RBC # BLD: 2.99 M/UL — LOW (ref 4.2–5.8)
RBC # FLD: 15 % — HIGH (ref 10.3–14.5)
VIT B12 SERPL-MCNC: 435 PG/ML — SIGNIFICANT CHANGE UP (ref 232–1245)
WBC # BLD: 6.6 K/UL — SIGNIFICANT CHANGE UP (ref 3.8–10.5)
WBC # FLD AUTO: 6.6 K/UL — SIGNIFICANT CHANGE UP (ref 3.8–10.5)

## 2023-01-06 PROCEDURE — 73560 X-RAY EXAM OF KNEE 1 OR 2: CPT | Mod: 26,50

## 2023-01-06 PROCEDURE — 99232 SBSQ HOSP IP/OBS MODERATE 35: CPT

## 2023-01-06 RX ORDER — GABAPENTIN 400 MG/1
300 CAPSULE ORAL AT BEDTIME
Refills: 0 | Status: DISCONTINUED | OUTPATIENT
Start: 2023-01-06 | End: 2023-01-06

## 2023-01-06 RX ORDER — GABAPENTIN 400 MG/1
200 CAPSULE ORAL AT BEDTIME
Refills: 0 | Status: DISCONTINUED | OUTPATIENT
Start: 2023-01-06 | End: 2023-01-10

## 2023-01-06 RX ORDER — VALSARTAN 80 MG/1
40 TABLET ORAL DAILY
Refills: 0 | Status: DISCONTINUED | OUTPATIENT
Start: 2023-01-06 | End: 2023-01-12

## 2023-01-06 RX ADMIN — SENNA PLUS 2 TABLET(S): 8.6 TABLET ORAL at 22:59

## 2023-01-06 RX ADMIN — HEPARIN SODIUM 5000 UNIT(S): 5000 INJECTION INTRAVENOUS; SUBCUTANEOUS at 13:59

## 2023-01-06 RX ADMIN — Medication 81 MILLIGRAM(S): at 12:21

## 2023-01-06 RX ADMIN — TAMSULOSIN HYDROCHLORIDE 0.4 MILLIGRAM(S): 0.4 CAPSULE ORAL at 22:59

## 2023-01-06 RX ADMIN — Medication 2: at 16:53

## 2023-01-06 RX ADMIN — HEPARIN SODIUM 5000 UNIT(S): 5000 INJECTION INTRAVENOUS; SUBCUTANEOUS at 05:19

## 2023-01-06 RX ADMIN — CLOPIDOGREL BISULFATE 75 MILLIGRAM(S): 75 TABLET, FILM COATED ORAL at 12:21

## 2023-01-06 RX ADMIN — Medication 100 MILLIGRAM(S): at 08:19

## 2023-01-06 RX ADMIN — INSULIN GLARGINE 12 UNIT(S): 100 INJECTION, SOLUTION SUBCUTANEOUS at 22:59

## 2023-01-06 RX ADMIN — VALSARTAN 40 MILLIGRAM(S): 80 TABLET ORAL at 12:21

## 2023-01-06 RX ADMIN — CARVEDILOL PHOSPHATE 3.12 MILLIGRAM(S): 80 CAPSULE, EXTENDED RELEASE ORAL at 05:19

## 2023-01-06 RX ADMIN — FAMOTIDINE 10 MILLIGRAM(S): 10 INJECTION INTRAVENOUS at 12:20

## 2023-01-06 RX ADMIN — GABAPENTIN 200 MILLIGRAM(S): 400 CAPSULE ORAL at 22:58

## 2023-01-06 RX ADMIN — ATORVASTATIN CALCIUM 80 MILLIGRAM(S): 80 TABLET, FILM COATED ORAL at 22:58

## 2023-01-06 RX ADMIN — ZINC OXIDE 1 APPLICATION(S): 200 OINTMENT TOPICAL at 12:21

## 2023-01-06 RX ADMIN — CHLORHEXIDINE GLUCONATE 1 APPLICATION(S): 213 SOLUTION TOPICAL at 12:19

## 2023-01-06 RX ADMIN — CARVEDILOL PHOSPHATE 3.12 MILLIGRAM(S): 80 CAPSULE, EXTENDED RELEASE ORAL at 17:20

## 2023-01-06 RX ADMIN — HEPARIN SODIUM 5000 UNIT(S): 5000 INJECTION INTRAVENOUS; SUBCUTANEOUS at 23:00

## 2023-01-06 RX ADMIN — GABAPENTIN 100 MILLIGRAM(S): 400 CAPSULE ORAL at 12:24

## 2023-01-06 RX ADMIN — POLYETHYLENE GLYCOL 3350 17 GRAM(S): 17 POWDER, FOR SOLUTION ORAL at 05:19
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
## 2023-01-06 NOTE — PROGRESS NOTE ADULT - SUBJECTIVE AND OBJECTIVE BOX
No distress    Vital Signs Last 24 Hrs  T(C): 36.7 (01-06-23 @ 08:00), Max: 36.7 (01-06-23 @ 08:00)  T(F): 98.1 (01-06-23 @ 08:00), Max: 98.1 (01-06-23 @ 08:00)  HR: 81 (01-06-23 @ 12:00) (81 - 87)  BP: 128/88 (01-06-23 @ 12:00) (128/88 - 136/77)  RR: 15 (01-06-23 @ 08:00) (15 - 15)  SpO2: 99% (01-06-23 @ 08:00) (99% - 99%)    I&O's Detail    05 Jan 2023 07:01  -  06 Jan 2023 07:00  --------------------------------------------------------  IN:    Other (mL): 1100 mL  Total IN: 1100 mL    OUT:    Other (mL): 2100 mL  Total OUT: 2100 mL    Total NET: -1000 mL    s1s2  b/l air entry  soft, ND  no edema                                                 8.1    6.60  )-----------( 333      ( 06 Jan 2023 05:30 )             25.4     05 Jan 2023 14:10    133    |  96     |  50     ----------------------------<  260    4.0     |  28     |  5.23     Ca    8.4        05 Jan 2023 14:10  Phos  4.3       05 Jan 2023 14:10    acetaminophen     Tablet .. 650 milliGRAM(s) Oral every 6 hours PRN  allopurinol 100 milliGRAM(s) Oral <User Schedule>  aspirin  chewable 81 milliGRAM(s) Oral daily  atorvastatin 80 milliGRAM(s) Oral at bedtime  carvedilol 3.125 milliGRAM(s) Oral every 12 hours  chlorhexidine 2% Cloths 1 Application(s) Topical daily  clopidogrel Tablet 75 milliGRAM(s) Oral daily  dextrose 5%. 1000 milliLiter(s) IV Continuous <Continuous>  dextrose 5%. 1000 milliLiter(s) IV Continuous <Continuous>  dextrose 50% Injectable 25 Gram(s) IV Push once  dextrose 50% Injectable 12.5 Gram(s) IV Push once  dextrose 50% Injectable 25 Gram(s) IV Push once  dextrose Oral Gel 15 Gram(s) Oral once PRN  epoetin wayne-epbx (RETACRIT) Injectable 71250 Unit(s) IV Push <User Schedule>  famotidine    Tablet 10 milliGRAM(s) Oral daily  gabapentin 200 milliGRAM(s) Oral at bedtime  gabapentin 100 milliGRAM(s) Oral daily  glucagon  Injectable 1 milliGRAM(s) IntraMuscular once  heparin   Injectable 5000 Unit(s) SubCutaneous every 8 hours  insulin glargine Injectable (LANTUS) 12 Unit(s) SubCutaneous at bedtime  insulin lispro (ADMELOG) corrective regimen sliding scale   SubCutaneous two times a day before meals  melatonin 3 milliGRAM(s) Oral at bedtime PRN  polyethylene glycol 3350 17 Gram(s) Oral two times a day  senna 2 Tablet(s) Oral at bedtime  tamsulosin 0.4 milliGRAM(s) Oral at bedtime  valsartan 40 milliGRAM(s) Oral daily  zinc oxide 40% Paste 1 Application(s) Topical daily    A/P:    DM, HTN, CM  S/p SDH  ESRD on HD TTS  2 kg fluid removal w/HD as able  Epoetin w/HD  S/p PRBCs w/last HD  F/u CBC  Rehab    [phone]

## 2023-01-07 LAB
ANION GAP SERPL CALC-SCNC: 9 MMOL/L — SIGNIFICANT CHANGE UP (ref 5–17)
BUN SERPL-MCNC: 53 MG/DL — HIGH (ref 7–23)
CALCIUM SERPL-MCNC: 8.8 MG/DL — SIGNIFICANT CHANGE UP (ref 8.4–10.5)
CHLORIDE SERPL-SCNC: 94 MMOL/L — LOW (ref 96–108)
CO2 SERPL-SCNC: 27 MMOL/L — SIGNIFICANT CHANGE UP (ref 22–31)
CREAT SERPL-MCNC: 5.22 MG/DL — HIGH (ref 0.5–1.3)
EGFR: 12 ML/MIN/1.73M2 — LOW
GLUCOSE BLDC GLUCOMTR-MCNC: 125 MG/DL — HIGH (ref 70–99)
GLUCOSE BLDC GLUCOMTR-MCNC: 191 MG/DL — HIGH (ref 70–99)
GLUCOSE BLDC GLUCOMTR-MCNC: 261 MG/DL — HIGH (ref 70–99)
GLUCOSE SERPL-MCNC: 244 MG/DL — HIGH (ref 70–99)
HCT VFR BLD CALC: 23.8 % — LOW (ref 39–50)
HGB BLD-MCNC: 7.5 G/DL — LOW (ref 13–17)
MCHC RBC-ENTMCNC: 27.7 PG — SIGNIFICANT CHANGE UP (ref 27–34)
MCHC RBC-ENTMCNC: 31.5 GM/DL — LOW (ref 32–36)
MCV RBC AUTO: 87.8 FL — SIGNIFICANT CHANGE UP (ref 80–100)
NRBC # BLD: 0 /100 WBCS — SIGNIFICANT CHANGE UP (ref 0–0)
PHOSPHATE SERPL-MCNC: 4.7 MG/DL — HIGH (ref 2.5–4.5)
PLATELET # BLD AUTO: 337 K/UL — SIGNIFICANT CHANGE UP (ref 150–400)
POTASSIUM SERPL-MCNC: 4 MMOL/L — SIGNIFICANT CHANGE UP (ref 3.5–5.3)
POTASSIUM SERPL-SCNC: 4 MMOL/L — SIGNIFICANT CHANGE UP (ref 3.5–5.3)
RBC # BLD: 2.71 M/UL — LOW (ref 4.2–5.8)
RBC # FLD: 15.1 % — HIGH (ref 10.3–14.5)
SODIUM SERPL-SCNC: 130 MMOL/L — LOW (ref 135–145)
WBC # BLD: 6.82 K/UL — SIGNIFICANT CHANGE UP (ref 3.8–10.5)
WBC # FLD AUTO: 6.82 K/UL — SIGNIFICANT CHANGE UP (ref 3.8–10.5)

## 2023-01-07 PROCEDURE — 99232 SBSQ HOSP IP/OBS MODERATE 35: CPT

## 2023-01-07 PROCEDURE — 99231 SBSQ HOSP IP/OBS SF/LOW 25: CPT

## 2023-01-07 RX ORDER — FOLIC ACID 0.8 MG
1 TABLET ORAL DAILY
Refills: 0 | Status: DISCONTINUED | OUTPATIENT
Start: 2023-01-07 | End: 2023-01-27

## 2023-01-07 RX ADMIN — INSULIN GLARGINE 12 UNIT(S): 100 INJECTION, SOLUTION SUBCUTANEOUS at 22:00

## 2023-01-07 RX ADMIN — TAMSULOSIN HYDROCHLORIDE 0.4 MILLIGRAM(S): 0.4 CAPSULE ORAL at 21:58

## 2023-01-07 RX ADMIN — ZINC OXIDE 1 APPLICATION(S): 200 OINTMENT TOPICAL at 11:55

## 2023-01-07 RX ADMIN — POLYETHYLENE GLYCOL 3350 17 GRAM(S): 17 POWDER, FOR SOLUTION ORAL at 07:01

## 2023-01-07 RX ADMIN — Medication 2: at 16:56

## 2023-01-07 RX ADMIN — Medication 650 MILLIGRAM(S): at 07:00

## 2023-01-07 RX ADMIN — HEPARIN SODIUM 5000 UNIT(S): 5000 INJECTION INTRAVENOUS; SUBCUTANEOUS at 16:53

## 2023-01-07 RX ADMIN — FAMOTIDINE 10 MILLIGRAM(S): 10 INJECTION INTRAVENOUS at 11:54

## 2023-01-07 RX ADMIN — GABAPENTIN 200 MILLIGRAM(S): 400 CAPSULE ORAL at 21:58

## 2023-01-07 RX ADMIN — CLOPIDOGREL BISULFATE 75 MILLIGRAM(S): 75 TABLET, FILM COATED ORAL at 11:54

## 2023-01-07 RX ADMIN — VALSARTAN 40 MILLIGRAM(S): 80 TABLET ORAL at 06:00

## 2023-01-07 RX ADMIN — CARVEDILOL PHOSPHATE 3.12 MILLIGRAM(S): 80 CAPSULE, EXTENDED RELEASE ORAL at 18:19

## 2023-01-07 RX ADMIN — HEPARIN SODIUM 5000 UNIT(S): 5000 INJECTION INTRAVENOUS; SUBCUTANEOUS at 21:58

## 2023-01-07 RX ADMIN — HEPARIN SODIUM 5000 UNIT(S): 5000 INJECTION INTRAVENOUS; SUBCUTANEOUS at 06:01

## 2023-01-07 RX ADMIN — ERYTHROPOIETIN 10000 UNIT(S): 10000 INJECTION, SOLUTION INTRAVENOUS; SUBCUTANEOUS at 14:10

## 2023-01-07 RX ADMIN — Medication 650 MILLIGRAM(S): at 06:00

## 2023-01-07 RX ADMIN — GABAPENTIN 100 MILLIGRAM(S): 400 CAPSULE ORAL at 11:59

## 2023-01-07 RX ADMIN — CARVEDILOL PHOSPHATE 3.12 MILLIGRAM(S): 80 CAPSULE, EXTENDED RELEASE ORAL at 06:00

## 2023-01-07 RX ADMIN — Medication 81 MILLIGRAM(S): at 11:54

## 2023-01-07 RX ADMIN — CHLORHEXIDINE GLUCONATE 1 APPLICATION(S): 213 SOLUTION TOPICAL at 11:59

## 2023-01-07 RX ADMIN — Medication 100 MILLIGRAM(S): at 08:43

## 2023-01-07 RX ADMIN — ATORVASTATIN CALCIUM 80 MILLIGRAM(S): 80 TABLET, FILM COATED ORAL at 23:17

## 2023-01-07 NOTE — PROGRESS NOTE ADULT - SUBJECTIVE AND OBJECTIVE BOX
[facility] NEPHROLOGY [facility], [facility]  NEPHROLOGY AND HYPERTENSION  [address]  [address]  [place] [address]  [phone]    MD FRANCIA SANTOS MD ANDREY GONCHARUK, MD MADHU KORRAPATI, MD YELENA ROSENBERG, MD BINNY KOSHY, MD CHRISTOPHER CAPUTO, MD EDWARD BOVER, MD          Patient events noted    MEDICATIONS  (STANDING):  allopurinol 100 milliGRAM(s) Oral <User Schedule>  aspirin  chewable 81 milliGRAM(s) Oral daily  atorvastatin 80 milliGRAM(s) Oral at bedtime  carvedilol 3.125 milliGRAM(s) Oral every 12 hours  chlorhexidine 2% Cloths 1 Application(s) Topical daily  clopidogrel Tablet 75 milliGRAM(s) Oral daily  dextrose 5%. 1000 milliLiter(s) (100 mL/Hr) IV Continuous <Continuous>  dextrose 5%. 1000 milliLiter(s) (50 mL/Hr) IV Continuous <Continuous>  dextrose 50% Injectable 25 Gram(s) IV Push once  dextrose 50% Injectable 12.5 Gram(s) IV Push once  dextrose 50% Injectable 25 Gram(s) IV Push once  epoetin wayne-epbx (RETACRIT) Injectable 81720 Unit(s) IV Push <User Schedule>  famotidine    Tablet 10 milliGRAM(s) Oral daily  folic acid 1 milliGRAM(s) Oral daily  gabapentin 200 milliGRAM(s) Oral at bedtime  gabapentin 100 milliGRAM(s) Oral daily  glucagon  Injectable 1 milliGRAM(s) IntraMuscular once  heparin   Injectable 5000 Unit(s) SubCutaneous every 8 hours  insulin glargine Injectable (LANTUS) 12 Unit(s) SubCutaneous at bedtime  insulin lispro (ADMELOG) corrective regimen sliding scale   SubCutaneous two times a day before meals  polyethylene glycol 3350 17 Gram(s) Oral two times a day  senna 2 Tablet(s) Oral at bedtime  tamsulosin 0.4 milliGRAM(s) Oral at bedtime  valsartan 40 milliGRAM(s) Oral daily  zinc oxide 40% Paste 1 Application(s) Topical daily    MEDICATIONS  (PRN):  acetaminophen     Tablet .. 650 milliGRAM(s) Oral every 6 hours PRN Temp greater or equal to 38C (100.4F), Mild Pain (1 - 3)  dextrose Oral Gel 15 Gram(s) Oral once PRN Blood Glucose LESS THAN 70 milliGRAM(s)/deciliter  melatonin 3 milliGRAM(s) Oral at bedtime PRN Insomnia      01-07-23 @ 07:01  -  01-08-23 @ 00:18  --------------------------------------------------------  IN: 0 mL / OUT: 1300 mL / NET: -1300 mL      PHYSICAL EXAM:      T(C): 36.7 (01-07-23 @ 21:55), Max: 36.7 (01-07-23 @ 11:45)  HR: 91 (01-07-23 @ 21:55) (80 - 99)  BP: 176/84 (01-07-23 @ 21:55) (105/56 - 176/84)  RR: 16 (01-07-23 @ 21:55) (15 - 20)  SpO2: 97% (01-07-23 @ 21:55) (97% - 100%)  Wt(kg): --  Lungs clear  Heart S1S2  Abd soft NT ND  Extremities:   tr edema                                    7.5    6.82  )-----------( 337      ( 07 Jan 2023 13:03 )             23.8     01-07    130<L>  |  94<L>  |  53<H>  ----------------------------<  244<H>  4.0   |  27  |  5.22<H>    Ca    8.8      07 Jan 2023 13:03  Phos  4.7     01-07          Creatinine Trend: 5.22<--, 5.23<--, 7.46<--, 5.45<--, 7.13<--, 8.58<--      Assessment   ESRD maintenance     Plan:  HD for today  UF as tolerated   will follow     German Forbes MD
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
## 2023-01-08 LAB
GLUCOSE BLDC GLUCOMTR-MCNC: 144 MG/DL — HIGH (ref 70–99)
GLUCOSE BLDC GLUCOMTR-MCNC: 259 MG/DL — HIGH (ref 70–99)
GLUCOSE BLDC GLUCOMTR-MCNC: 293 MG/DL — HIGH (ref 70–99)

## 2023-01-08 PROCEDURE — 99232 SBSQ HOSP IP/OBS MODERATE 35: CPT

## 2023-01-08 RX ORDER — COLCHICINE 0.6 MG
0.6 TABLET ORAL
Refills: 0 | Status: COMPLETED | OUTPATIENT
Start: 2023-01-08 | End: 2023-01-10

## 2023-01-08 RX ORDER — INSULIN LISPRO 100/ML
VIAL (ML) SUBCUTANEOUS AT BEDTIME
Refills: 0 | Status: DISCONTINUED | OUTPATIENT
Start: 2023-01-08 | End: 2023-01-27

## 2023-01-08 RX ORDER — INSULIN LISPRO 100/ML
VIAL (ML) SUBCUTANEOUS
Refills: 0 | Status: DISCONTINUED | OUTPATIENT
Start: 2023-01-08 | End: 2023-01-09

## 2023-01-08 RX ADMIN — ATORVASTATIN CALCIUM 80 MILLIGRAM(S): 80 TABLET, FILM COATED ORAL at 21:11

## 2023-01-08 RX ADMIN — ZINC OXIDE 1 APPLICATION(S): 200 OINTMENT TOPICAL at 11:44

## 2023-01-08 RX ADMIN — Medication 0.6 MILLIGRAM(S): at 18:09

## 2023-01-08 RX ADMIN — Medication 1: at 21:18

## 2023-01-08 RX ADMIN — CLOPIDOGREL BISULFATE 75 MILLIGRAM(S): 75 TABLET, FILM COATED ORAL at 11:44

## 2023-01-08 RX ADMIN — CARVEDILOL PHOSPHATE 3.12 MILLIGRAM(S): 80 CAPSULE, EXTENDED RELEASE ORAL at 05:48

## 2023-01-08 RX ADMIN — Medication 650 MILLIGRAM(S): at 12:45

## 2023-01-08 RX ADMIN — GABAPENTIN 100 MILLIGRAM(S): 400 CAPSULE ORAL at 11:47

## 2023-01-08 RX ADMIN — HEPARIN SODIUM 5000 UNIT(S): 5000 INJECTION INTRAVENOUS; SUBCUTANEOUS at 21:11

## 2023-01-08 RX ADMIN — Medication 650 MILLIGRAM(S): at 11:54

## 2023-01-08 RX ADMIN — SENNA PLUS 2 TABLET(S): 8.6 TABLET ORAL at 21:10

## 2023-01-08 RX ADMIN — GABAPENTIN 200 MILLIGRAM(S): 400 CAPSULE ORAL at 21:10

## 2023-01-08 RX ADMIN — Medication 6: at 16:53

## 2023-01-08 RX ADMIN — Medication 1 MILLIGRAM(S): at 11:44

## 2023-01-08 RX ADMIN — INSULIN GLARGINE 12 UNIT(S): 100 INJECTION, SOLUTION SUBCUTANEOUS at 21:11

## 2023-01-08 RX ADMIN — CARVEDILOL PHOSPHATE 3.12 MILLIGRAM(S): 80 CAPSULE, EXTENDED RELEASE ORAL at 18:09

## 2023-01-08 RX ADMIN — Medication 100 MILLIGRAM(S): at 09:07

## 2023-01-08 RX ADMIN — VALSARTAN 40 MILLIGRAM(S): 80 TABLET ORAL at 05:48

## 2023-01-08 RX ADMIN — TAMSULOSIN HYDROCHLORIDE 0.4 MILLIGRAM(S): 0.4 CAPSULE ORAL at 21:11

## 2023-01-08 RX ADMIN — HEPARIN SODIUM 5000 UNIT(S): 5000 INJECTION INTRAVENOUS; SUBCUTANEOUS at 14:36

## 2023-01-08 RX ADMIN — POLYETHYLENE GLYCOL 3350 17 GRAM(S): 17 POWDER, FOR SOLUTION ORAL at 18:09

## 2023-01-08 RX ADMIN — FAMOTIDINE 10 MILLIGRAM(S): 10 INJECTION INTRAVENOUS at 11:44

## 2023-01-08 RX ADMIN — Medication 81 MILLIGRAM(S): at 11:44

## 2023-01-08 RX ADMIN — Medication 40 MILLIGRAM(S): at 12:50

## 2023-01-08 RX ADMIN — CHLORHEXIDINE GLUCONATE 1 APPLICATION(S): 213 SOLUTION TOPICAL at 11:45

## 2023-01-08 RX ADMIN — HEPARIN SODIUM 5000 UNIT(S): 5000 INJECTION INTRAVENOUS; SUBCUTANEOUS at 05:48

## 2023-01-08 RX ADMIN — Medication 650 MILLIGRAM(S): at 22:10

## 2023-01-08 RX ADMIN — Medication 650 MILLIGRAM(S): at 21:10

## 2023-01-08 NOTE — PROVIDER CONTACT NOTE (OTHER) - ASSESSMENT
Wrist looks slightly inflamed. Pt complaining of 10/10 wrist pain when moving wrist but mild ache at rest.
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
## 2023-01-08 NOTE — PROGRESS NOTE ADULT - SUBJECTIVE AND OBJECTIVE BOX
Patient is a 62y old  Male who presents with a chief complaint of Traumatic SDH    No events overnight  S/p one unit PRBC 1/5/22 with HD  During HD yesterday, it was noted that his SBP dropped to the 80's  When he was brought back, it normalized  Denies chest pain, SOB  Patient seen and examined at bedside.    ALLERGIES:  No Known Drug Allergies  shellfish (Unknown)    MEDICATIONS  (STANDING):  allopurinol 100 milliGRAM(s) Oral <User Schedule>  aspirin  chewable 81 milliGRAM(s) Oral daily  atorvastatin 80 milliGRAM(s) Oral at bedtime  carvedilol 12.5 milliGRAM(s) Oral <User Schedule>  chlorhexidine 2% Cloths 1 Application(s) Topical daily  clopidogrel Tablet 75 milliGRAM(s) Oral daily  dextrose 5%. 1000 milliLiter(s) (50 mL/Hr) IV Continuous <Continuous>  dextrose 5%. 1000 milliLiter(s) (100 mL/Hr) IV Continuous <Continuous>  dextrose 50% Injectable 12.5 Gram(s) IV Push once  dextrose 50% Injectable 25 Gram(s) IV Push once  dextrose 50% Injectable 25 Gram(s) IV Push once  epoetin wayne-epbx (RETACRIT) Injectable 21823 Unit(s) IV Push <User Schedule>  famotidine    Tablet 10 milliGRAM(s) Oral daily  gabapentin 100 milliGRAM(s) Oral daily  gabapentin 200 milliGRAM(s) Oral at bedtime  glucagon  Injectable 1 milliGRAM(s) IntraMuscular once  heparin   Injectable 5000 Unit(s) SubCutaneous every 8 hours  insulin glargine Injectable (LANTUS) 12 Unit(s) SubCutaneous at bedtime  insulin lispro (ADMELOG) corrective regimen sliding scale   SubCutaneous two times a day before meals  polyethylene glycol 3350 17 Gram(s) Oral two times a day  senna 2 Tablet(s) Oral at bedtime  tamsulosin 0.4 milliGRAM(s) Oral at bedtime  valsartan 40 milliGRAM(s) Oral daily  zinc oxide 40% Paste 1 Application(s) Topical daily    MEDICATIONS  (PRN):  acetaminophen     Tablet .. 650 milliGRAM(s) Oral every 6 hours PRN Temp greater or equal to 38C (100.4F)  dextrose Oral Gel 15 Gram(s) Oral once PRN Blood Glucose LESS THAN 70 milliGRAM(s)/deciliter  melatonin 3 milliGRAM(s) Oral at bedtime PRN Insomnia    Vital Signs Last 24 Hrs  T(C): 36.8 (08 Jan 2023 09:00), Max: 36.8 (08 Jan 2023 09:00)  T(F): 98.3 (08 Jan 2023 09:00), Max: 98.3 (08 Jan 2023 09:00)  HR: 80 (08 Jan 2023 09:00) (80 - 99)  BP: 159/66 (08 Jan 2023 09:00) (105/56 - 176/84)  RR: 16 (08 Jan 2023 09:00) (15 - 20)  SpO2: 98% (08 Jan 2023 09:00) (97% - 100%)    Parameters below as of 08 Jan 2023 09:00  Patient On (Oxygen Delivery Method): room air    PHYSICAL EXAM:  General: NAD, A/O x 2, appears weak, comfortable, speaking in full sentences  ENT: MMM, no scleral icterus  Neck: Supple, No JVD, no thyroidomegaly  Lungs: Clear to auscultation bilaterally, no wheezes, no rales, no rhonchi, good inspiratory effort  Cardio: RRR, S1/S2, No murmurs  Abdomen: Soft, Nontender, Nondistended; Bowel sounds present  Extremities: No calf tenderness, No pitting edema, no skin changes    LABS:                        7.5    6.82  )-----------( 337      ( 07 Jan 2023 13:03 )             23.8                  01-07    130<L>  |  94<L>  |  53<H>  ----------------------------<  244<H>  4.0   |  27  |  5.22<H>    Ca    8.8      07 Jan 2023 13:03  Phos  4.7     01-07    POCT Blood Glucose.: 126 mg/dL (05 Jan 2023 07:10)  POCT Blood Glucose.: 185 mg/dL (04 Jan 2023 21:22)  POCT Blood Glucose.: 220 mg/dL (04 Jan 2023 16:32)    COVID-19 PCR: NotDetec (12-30-22 @ 09:48)  COVID-19 PCR: NotDetec (12-25-22 @ 23:02)  COVID-19 PCR: Detected (12-25-22 @ 10:37)  COVID-19 PCR: NotDetec (12-21-22 @ 22:30)  COVID-19 PCR: Detected (12-21-22 @ 15:17)  COVID-19 PCR: NotDetec (12-30-22 @ 09:48)  COVID-19 PCR: NotDetec (12-25-22 @ 23:02)  COVID-19 PCR: Detected (12-25-22 @ 10:37)  COVID-19 PCR: NotDetec (12-21-22 @ 22:30)  COVID-19 PCR: Detected (12-21-22 @ 15:17)  COVID-19 PCR: NotDetec (12-12-22 @ 19:18)  COVID-19 PCR: Detected (12-12-22 @ 17:22)  COVID-19 PCR: NotDetec (11-06-22 @ 08:08)  COVID-19 PCR: NotDetec (11-02-22 @ 07:23)  COVID-19 PCR: NotDetec (10-27-22 @ 09:26)

## 2023-01-09 LAB
ANION GAP SERPL CALC-SCNC: 11 MMOL/L — SIGNIFICANT CHANGE UP (ref 5–17)
BLD GP AB SCN SERPL QL: SIGNIFICANT CHANGE UP
BUN SERPL-MCNC: 56 MG/DL — HIGH (ref 7–23)
CALCIUM SERPL-MCNC: 9 MG/DL — SIGNIFICANT CHANGE UP (ref 8.4–10.5)
CHLORIDE SERPL-SCNC: 96 MMOL/L — SIGNIFICANT CHANGE UP (ref 96–108)
CO2 SERPL-SCNC: 29 MMOL/L — SIGNIFICANT CHANGE UP (ref 22–31)
CREAT SERPL-MCNC: 5.09 MG/DL — HIGH (ref 0.5–1.3)
EGFR: 12 ML/MIN/1.73M2 — LOW
GLUCOSE BLDC GLUCOMTR-MCNC: 192 MG/DL — HIGH (ref 70–99)
GLUCOSE BLDC GLUCOMTR-MCNC: 235 MG/DL — HIGH (ref 70–99)
GLUCOSE BLDC GLUCOMTR-MCNC: 302 MG/DL — HIGH (ref 70–99)
GLUCOSE BLDC GLUCOMTR-MCNC: 305 MG/DL — HIGH (ref 70–99)
GLUCOSE SERPL-MCNC: 269 MG/DL — HIGH (ref 70–99)
HCT VFR BLD CALC: 27.2 % — LOW (ref 39–50)
HGB BLD-MCNC: 8.5 G/DL — LOW (ref 13–17)
MCHC RBC-ENTMCNC: 27.2 PG — SIGNIFICANT CHANGE UP (ref 27–34)
MCHC RBC-ENTMCNC: 31.3 GM/DL — LOW (ref 32–36)
MCV RBC AUTO: 86.9 FL — SIGNIFICANT CHANGE UP (ref 80–100)
NRBC # BLD: 0 /100 WBCS — SIGNIFICANT CHANGE UP (ref 0–0)
PLATELET # BLD AUTO: 358 K/UL — SIGNIFICANT CHANGE UP (ref 150–400)
POTASSIUM SERPL-MCNC: 4 MMOL/L — SIGNIFICANT CHANGE UP (ref 3.5–5.3)
POTASSIUM SERPL-SCNC: 4 MMOL/L — SIGNIFICANT CHANGE UP (ref 3.5–5.3)
RBC # BLD: 3.13 M/UL — LOW (ref 4.2–5.8)
RBC # FLD: 14.6 % — HIGH (ref 10.3–14.5)
SODIUM SERPL-SCNC: 136 MMOL/L — SIGNIFICANT CHANGE UP (ref 135–145)
WBC # BLD: 5.37 K/UL — SIGNIFICANT CHANGE UP (ref 3.8–10.5)
WBC # FLD AUTO: 5.37 K/UL — SIGNIFICANT CHANGE UP (ref 3.8–10.5)

## 2023-01-09 PROCEDURE — 99232 SBSQ HOSP IP/OBS MODERATE 35: CPT

## 2023-01-09 RX ORDER — INSULIN GLARGINE 100 [IU]/ML
16 INJECTION, SOLUTION SUBCUTANEOUS AT BEDTIME
Refills: 0 | Status: DISCONTINUED | OUTPATIENT
Start: 2023-01-09 | End: 2023-01-17

## 2023-01-09 RX ORDER — INSULIN LISPRO 100/ML
VIAL (ML) SUBCUTANEOUS
Refills: 0 | Status: DISCONTINUED | OUTPATIENT
Start: 2023-01-09 | End: 2023-01-27

## 2023-01-09 RX ADMIN — FAMOTIDINE 10 MILLIGRAM(S): 10 INJECTION INTRAVENOUS at 12:23

## 2023-01-09 RX ADMIN — HEPARIN SODIUM 5000 UNIT(S): 5000 INJECTION INTRAVENOUS; SUBCUTANEOUS at 15:54

## 2023-01-09 RX ADMIN — POLYETHYLENE GLYCOL 3350 17 GRAM(S): 17 POWDER, FOR SOLUTION ORAL at 05:15

## 2023-01-09 RX ADMIN — CLOPIDOGREL BISULFATE 75 MILLIGRAM(S): 75 TABLET, FILM COATED ORAL at 12:22

## 2023-01-09 RX ADMIN — Medication 81 MILLIGRAM(S): at 12:24

## 2023-01-09 RX ADMIN — HEPARIN SODIUM 5000 UNIT(S): 5000 INJECTION INTRAVENOUS; SUBCUTANEOUS at 05:14

## 2023-01-09 RX ADMIN — Medication 0.6 MILLIGRAM(S): at 17:40

## 2023-01-09 RX ADMIN — Medication 40 MILLIGRAM(S): at 05:15

## 2023-01-09 RX ADMIN — Medication 650 MILLIGRAM(S): at 23:29

## 2023-01-09 RX ADMIN — GABAPENTIN 100 MILLIGRAM(S): 400 CAPSULE ORAL at 12:26

## 2023-01-09 RX ADMIN — INSULIN GLARGINE 16 UNIT(S): 100 INJECTION, SOLUTION SUBCUTANEOUS at 21:46

## 2023-01-09 RX ADMIN — SENNA PLUS 2 TABLET(S): 8.6 TABLET ORAL at 21:47

## 2023-01-09 RX ADMIN — Medication 0.6 MILLIGRAM(S): at 05:14

## 2023-01-09 RX ADMIN — CHLORHEXIDINE GLUCONATE 1 APPLICATION(S): 213 SOLUTION TOPICAL at 12:24

## 2023-01-09 RX ADMIN — Medication 4: at 08:21

## 2023-01-09 RX ADMIN — Medication 1 MILLIGRAM(S): at 12:22

## 2023-01-09 RX ADMIN — HEPARIN SODIUM 5000 UNIT(S): 5000 INJECTION INTRAVENOUS; SUBCUTANEOUS at 21:46

## 2023-01-09 RX ADMIN — GABAPENTIN 200 MILLIGRAM(S): 400 CAPSULE ORAL at 21:46

## 2023-01-09 RX ADMIN — ZINC OXIDE 1 APPLICATION(S): 200 OINTMENT TOPICAL at 12:22

## 2023-01-09 RX ADMIN — CARVEDILOL PHOSPHATE 3.12 MILLIGRAM(S): 80 CAPSULE, EXTENDED RELEASE ORAL at 05:15

## 2023-01-09 RX ADMIN — Medication 8: at 12:19

## 2023-01-09 RX ADMIN — ATORVASTATIN CALCIUM 80 MILLIGRAM(S): 80 TABLET, FILM COATED ORAL at 21:47

## 2023-01-09 RX ADMIN — Medication 10: at 16:52

## 2023-01-09 RX ADMIN — CARVEDILOL PHOSPHATE 3.12 MILLIGRAM(S): 80 CAPSULE, EXTENDED RELEASE ORAL at 17:39

## 2023-01-09 RX ADMIN — TAMSULOSIN HYDROCHLORIDE 0.4 MILLIGRAM(S): 0.4 CAPSULE ORAL at 21:47

## 2023-01-09 RX ADMIN — VALSARTAN 40 MILLIGRAM(S): 80 TABLET ORAL at 05:14

## 2023-01-09 RX ADMIN — Medication 100 MILLIGRAM(S): at 08:21

## 2023-01-09 RX ADMIN — POLYETHYLENE GLYCOL 3350 17 GRAM(S): 17 POWDER, FOR SOLUTION ORAL at 17:39
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
## 2023-01-09 NOTE — PROGRESS NOTE ADULT - SUBJECTIVE AND OBJECTIVE BOX
No distress    Vital Signs Last 24 Hrs  T(C): 36.6 (01-09-23 @ 19:48), Max: 36.6 (01-09-23 @ 17:40)  T(F): 97.9 (01-09-23 @ 19:48), Max: 97.9 (01-09-23 @ 17:40)  HR: 80 (01-09-23 @ 19:48) (80 - 98)  BP: 162/90 (01-09-23 @ 19:48) (148/88 - 162/90)  RR: 15 (01-09-23 @ 19:48) (15 - 15)  SpO2: 99% (01-09-23 @ 19:48) (98% - 99%)    I&O's Detail    08 Jan 2023 07:01  -  09 Jan 2023 07:00  --------------------------------------------------------  OUT:    Voided (mL): 300 mL  Total OUT: 300 mL    09 Jan 2023 07:01  -  09 Jan 2023 21:12  --------------------------------------------------------  IN:    Oral Fluid: 480 mL  Total IN: 480 mL    OUT:    Voided (mL): 500 mL  Total OUT: 500 mL    s1s2  b/l air entry  soft, ND  no edema                                                          8.5    5.37  )-----------( 358      ( 09 Jan 2023 06:07 )             27.2     09 Jan 2023 06:07    136    |  96     |  56     ----------------------------<  269    4.0     |  29     |  5.09     Ca    9.0        09 Jan 2023 06:07    acetaminophen     Tablet .. 650 milliGRAM(s) Oral every 6 hours PRN  allopurinol 100 milliGRAM(s) Oral <User Schedule>  aspirin  chewable 81 milliGRAM(s) Oral daily  atorvastatin 80 milliGRAM(s) Oral at bedtime  carvedilol 3.125 milliGRAM(s) Oral every 12 hours  chlorhexidine 2% Cloths 1 Application(s) Topical daily  clopidogrel Tablet 75 milliGRAM(s) Oral daily  colchicine 0.6 milliGRAM(s) Oral two times a day  dextrose 5%. 1000 milliLiter(s) IV Continuous <Continuous>  dextrose 5%. 1000 milliLiter(s) IV Continuous <Continuous>  dextrose 50% Injectable 25 Gram(s) IV Push once  dextrose 50% Injectable 12.5 Gram(s) IV Push once  dextrose 50% Injectable 25 Gram(s) IV Push once  dextrose Oral Gel 15 Gram(s) Oral once PRN  epoetin wayne-epbx (RETACRIT) Injectable 03382 Unit(s) IV Push <User Schedule>  famotidine    Tablet 10 milliGRAM(s) Oral daily  folic acid 1 milliGRAM(s) Oral daily  gabapentin 200 milliGRAM(s) Oral at bedtime  gabapentin 100 milliGRAM(s) Oral daily  glucagon  Injectable 1 milliGRAM(s) IntraMuscular once  heparin   Injectable 5000 Unit(s) SubCutaneous every 8 hours  insulin glargine Injectable (LANTUS) 16 Unit(s) SubCutaneous at bedtime  insulin lispro (ADMELOG) corrective regimen sliding scale   SubCutaneous three times a day with meals  insulin lispro (ADMELOG) corrective regimen sliding scale   SubCutaneous at bedtime  melatonin 3 milliGRAM(s) Oral at bedtime PRN  polyethylene glycol 3350 17 Gram(s) Oral two times a day  predniSONE   Tablet   Oral   senna 2 Tablet(s) Oral at bedtime  tamsulosin 0.4 milliGRAM(s) Oral at bedtime  valsartan 40 milliGRAM(s) Oral daily  zinc oxide 40% Paste 1 Application(s) Topical daily    A/P:    DM, HTN, CM  S/p SDH  ESRD on HD TTS  2 kg fluid removal w/HD as able  Epoetin w/HD  Rehab    [phone]
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
## 2023-01-09 NOTE — PROGRESS NOTE ADULT - SUBJECTIVE AND OBJECTIVE BOX
Patient is a 62y old  Male who presents with a chief complaint of Traumatic SDH (08 Jan 2023 11:54)    Patient reports left wrist pain yesterday; started on prednisone and colchicine  Reports having improved pain  Denies chest pain, SOB  Tolerating diet    Patient seen and examined at bedside.    ALLERGIES:  No Known Drug Allergies  shellfish (Unknown)    MEDICATIONS  (STANDING):  allopurinol 100 milliGRAM(s) Oral <User Schedule>  aspirin  chewable 81 milliGRAM(s) Oral daily  atorvastatin 80 milliGRAM(s) Oral at bedtime  carvedilol 3.125 milliGRAM(s) Oral every 12 hours  chlorhexidine 2% Cloths 1 Application(s) Topical daily  clopidogrel Tablet 75 milliGRAM(s) Oral daily  colchicine 0.6 milliGRAM(s) Oral two times a day  dextrose 5%. 1000 milliLiter(s) (50 mL/Hr) IV Continuous <Continuous>  dextrose 5%. 1000 milliLiter(s) (100 mL/Hr) IV Continuous <Continuous>  dextrose 50% Injectable 12.5 Gram(s) IV Push once  dextrose 50% Injectable 25 Gram(s) IV Push once  dextrose 50% Injectable 25 Gram(s) IV Push once  epoetin wayne-epbx (RETACRIT) Injectable 76718 Unit(s) IV Push <User Schedule>  famotidine    Tablet 10 milliGRAM(s) Oral daily  folic acid 1 milliGRAM(s) Oral daily  gabapentin 200 milliGRAM(s) Oral at bedtime  gabapentin 100 milliGRAM(s) Oral daily  glucagon  Injectable 1 milliGRAM(s) IntraMuscular once  heparin   Injectable 5000 Unit(s) SubCutaneous every 8 hours  insulin glargine Injectable (LANTUS) 12 Unit(s) SubCutaneous at bedtime  insulin lispro (ADMELOG) corrective regimen sliding scale   SubCutaneous three times a day with meals  insulin lispro (ADMELOG) corrective regimen sliding scale   SubCutaneous at bedtime  polyethylene glycol 3350 17 Gram(s) Oral two times a day  predniSONE   Tablet   Oral   senna 2 Tablet(s) Oral at bedtime  tamsulosin 0.4 milliGRAM(s) Oral at bedtime  valsartan 40 milliGRAM(s) Oral daily  zinc oxide 40% Paste 1 Application(s) Topical daily    MEDICATIONS  (PRN):  acetaminophen     Tablet .. 650 milliGRAM(s) Oral every 6 hours PRN Temp greater or equal to 38C (100.4F), Mild Pain (1 - 3)  dextrose Oral Gel 15 Gram(s) Oral once PRN Blood Glucose LESS THAN 70 milliGRAM(s)/deciliter  melatonin 3 milliGRAM(s) Oral at bedtime PRN Insomnia    Vital Signs Last 24 Hrs  T(F): 98 (08 Jan 2023 20:22), Max: 98 (08 Jan 2023 20:22)  HR: 98 (09 Jan 2023 05:12) (84 - 98)  BP: 148/88 (09 Jan 2023 05:12) (148/88 - 156/96)  RR: 15 (08 Jan 2023 20:22) (15 - 16)  SpO2: 98% (08 Jan 2023 20:22) (98% - 98%)  I&O's Summary    08 Jan 2023 07:01  -  09 Jan 2023 07:00  --------------------------------------------------------  IN: 0 mL / OUT: 300 mL / NET: -300 mL    PHYSICAL EXAM:  General: NAD, A/O x 2, appears weak, comfortable, speaking in full sentences  ENT: MMM, no scleral icterus  Neck: Supple, No JVD, no thyroidomegaly  Lungs: Clear to auscultation bilaterally, no wheezes, no rales, no rhonchi, good inspiratory effort  Cardio: RRR, S1/S2, No murmurs  Abdomen: Soft, Nontender, Nondistended; Bowel sounds present  Extremities: No calf tenderness, No pitting edema, no skin changes    LABS:                        8.5    5.37  )-----------( 358      ( 09 Jan 2023 06:07 )             27.2       01-09    136  |  96  |  56  ----------------------------<  269  4.0   |  29  |  5.09    Ca    9.0      09 Jan 2023 06:07  Phos  4.7     01-07    POCT Blood Glucose.: 305 mg/dL (09 Jan 2023 12:18)  POCT Blood Glucose.: 235 mg/dL (09 Jan 2023 08:17)  POCT Blood Glucose.: 293 mg/dL (08 Jan 2023 21:04)  POCT Blood Glucose.: 259 mg/dL (08 Jan 2023 16:49)    COVID-19 PCR: NotDetec (12-30-22 @ 09:48)  COVID-19 PCR: NotDetec (12-25-22 @ 23:02)  COVID-19 PCR: Detected (12-25-22 @ 10:37)  COVID-19 PCR: NotDetec (12-21-22 @ 22:30)  COVID-19 PCR: Detected (12-21-22 @ 15:17)  COVID-19 PCR: NotDetec (12-30-22 @ 09:48)  COVID-19 PCR: NotDetec (12-25-22 @ 23:02)  COVID-19 PCR: Detected (12-25-22 @ 10:37)  COVID-19 PCR: NotDetec (12-21-22 @ 22:30)  COVID-19 PCR: Detected (12-21-22 @ 15:17)  COVID-19 PCR: NotDetec (12-12-22 @ 19:18)  COVID-19 PCR: Detected (12-12-22 @ 17:22)  COVID-19 PCR: NotDetec (11-06-22 @ 08:08)  COVID-19 PCR: NotDetec (11-02-22 @ 07:23)  COVID-19 PCR: NotDetec (10-27-22 @ 09:26)

## 2023-01-10 LAB
ANION GAP SERPL CALC-SCNC: 14 MMOL/L — SIGNIFICANT CHANGE UP (ref 5–17)
BUN SERPL-MCNC: 83 MG/DL — HIGH (ref 7–23)
CALCIUM SERPL-MCNC: 8.7 MG/DL — SIGNIFICANT CHANGE UP (ref 8.4–10.5)
CHLORIDE SERPL-SCNC: 93 MMOL/L — LOW (ref 96–108)
CO2 SERPL-SCNC: 24 MMOL/L — SIGNIFICANT CHANGE UP (ref 22–31)
CREAT SERPL-MCNC: 5.59 MG/DL — HIGH (ref 0.5–1.3)
EGFR: 11 ML/MIN/1.73M2 — LOW
GLUCOSE BLDC GLUCOMTR-MCNC: 111 MG/DL — HIGH (ref 70–99)
GLUCOSE BLDC GLUCOMTR-MCNC: 170 MG/DL — HIGH (ref 70–99)
GLUCOSE BLDC GLUCOMTR-MCNC: 199 MG/DL — HIGH (ref 70–99)
GLUCOSE BLDC GLUCOMTR-MCNC: 298 MG/DL — HIGH (ref 70–99)
GLUCOSE SERPL-MCNC: 242 MG/DL — HIGH (ref 70–99)
HCT VFR BLD CALC: 25 % — LOW (ref 39–50)
HGB BLD-MCNC: 8 G/DL — LOW (ref 13–17)
MCHC RBC-ENTMCNC: 27.1 PG — SIGNIFICANT CHANGE UP (ref 27–34)
MCHC RBC-ENTMCNC: 32 GM/DL — SIGNIFICANT CHANGE UP (ref 32–36)
MCV RBC AUTO: 84.7 FL — SIGNIFICANT CHANGE UP (ref 80–100)
NRBC # BLD: 0 /100 WBCS — SIGNIFICANT CHANGE UP (ref 0–0)
PHOSPHATE SERPL-MCNC: 4.8 MG/DL — HIGH (ref 2.5–4.5)
PLATELET # BLD AUTO: 398 K/UL — SIGNIFICANT CHANGE UP (ref 150–400)
POTASSIUM SERPL-MCNC: 4.6 MMOL/L — SIGNIFICANT CHANGE UP (ref 3.5–5.3)
POTASSIUM SERPL-SCNC: 4.6 MMOL/L — SIGNIFICANT CHANGE UP (ref 3.5–5.3)
RBC # BLD: 2.95 M/UL — LOW (ref 4.2–5.8)
RBC # FLD: 14.8 % — HIGH (ref 10.3–14.5)
SODIUM SERPL-SCNC: 131 MMOL/L — LOW (ref 135–145)
WBC # BLD: 9.7 K/UL — SIGNIFICANT CHANGE UP (ref 3.8–10.5)
WBC # FLD AUTO: 9.7 K/UL — SIGNIFICANT CHANGE UP (ref 3.8–10.5)

## 2023-01-10 PROCEDURE — 99232 SBSQ HOSP IP/OBS MODERATE 35: CPT

## 2023-01-10 RX ORDER — LIDOCAINE AND PRILOCAINE CREAM 25; 25 MG/G; MG/G
1 CREAM TOPICAL
Refills: 0 | Status: DISCONTINUED | OUTPATIENT
Start: 2023-01-10 | End: 2023-01-27

## 2023-01-10 RX ORDER — GABAPENTIN 400 MG/1
300 CAPSULE ORAL AT BEDTIME
Refills: 0 | Status: DISCONTINUED | OUTPATIENT
Start: 2023-01-10 | End: 2023-01-18

## 2023-01-10 RX ADMIN — Medication 1 MILLIGRAM(S): at 11:54

## 2023-01-10 RX ADMIN — LIDOCAINE AND PRILOCAINE CREAM 1 APPLICATION(S): 25; 25 CREAM TOPICAL at 13:25

## 2023-01-10 RX ADMIN — Medication 4: at 18:29

## 2023-01-10 RX ADMIN — Medication 650 MILLIGRAM(S): at 00:29

## 2023-01-10 RX ADMIN — Medication 100 MILLIGRAM(S): at 08:41

## 2023-01-10 RX ADMIN — ATORVASTATIN CALCIUM 80 MILLIGRAM(S): 80 TABLET, FILM COATED ORAL at 21:53

## 2023-01-10 RX ADMIN — POLYETHYLENE GLYCOL 3350 17 GRAM(S): 17 POWDER, FOR SOLUTION ORAL at 06:01

## 2023-01-10 RX ADMIN — FAMOTIDINE 10 MILLIGRAM(S): 10 INJECTION INTRAVENOUS at 11:53

## 2023-01-10 RX ADMIN — ZINC OXIDE 1 APPLICATION(S): 200 OINTMENT TOPICAL at 18:29

## 2023-01-10 RX ADMIN — HEPARIN SODIUM 5000 UNIT(S): 5000 INJECTION INTRAVENOUS; SUBCUTANEOUS at 06:01

## 2023-01-10 RX ADMIN — INSULIN GLARGINE 16 UNIT(S): 100 INJECTION, SOLUTION SUBCUTANEOUS at 21:53

## 2023-01-10 RX ADMIN — CHLORHEXIDINE GLUCONATE 1 APPLICATION(S): 213 SOLUTION TOPICAL at 11:54

## 2023-01-10 RX ADMIN — CARVEDILOL PHOSPHATE 3.12 MILLIGRAM(S): 80 CAPSULE, EXTENDED RELEASE ORAL at 18:29

## 2023-01-10 RX ADMIN — Medication 30 MILLIGRAM(S): at 06:01

## 2023-01-10 RX ADMIN — TAMSULOSIN HYDROCHLORIDE 0.4 MILLIGRAM(S): 0.4 CAPSULE ORAL at 21:53

## 2023-01-10 RX ADMIN — ERYTHROPOIETIN 10000 UNIT(S): 10000 INJECTION, SOLUTION INTRAVENOUS; SUBCUTANEOUS at 16:25

## 2023-01-10 RX ADMIN — CARVEDILOL PHOSPHATE 3.12 MILLIGRAM(S): 80 CAPSULE, EXTENDED RELEASE ORAL at 06:01

## 2023-01-10 RX ADMIN — Medication 650 MILLIGRAM(S): at 16:58

## 2023-01-10 RX ADMIN — HEPARIN SODIUM 5000 UNIT(S): 5000 INJECTION INTRAVENOUS; SUBCUTANEOUS at 13:24

## 2023-01-10 RX ADMIN — Medication 8: at 12:01

## 2023-01-10 RX ADMIN — Medication 0.6 MILLIGRAM(S): at 06:01

## 2023-01-10 RX ADMIN — Medication 81 MILLIGRAM(S): at 11:52

## 2023-01-10 RX ADMIN — HEPARIN SODIUM 5000 UNIT(S): 5000 INJECTION INTRAVENOUS; SUBCUTANEOUS at 21:53

## 2023-01-10 RX ADMIN — VALSARTAN 40 MILLIGRAM(S): 80 TABLET ORAL at 06:01

## 2023-01-10 RX ADMIN — CLOPIDOGREL BISULFATE 75 MILLIGRAM(S): 75 TABLET, FILM COATED ORAL at 11:54

## 2023-01-10 RX ADMIN — GABAPENTIN 300 MILLIGRAM(S): 400 CAPSULE ORAL at 21:52

## 2023-01-10 NOTE — PROVIDER CONTACT NOTE (CHANGE IN STATUS NOTIFICATION) - ASSESSMENT
patient felt warm. rectal temp 101.  BP, HR, RR SaO2 WNL. No distress noted. patient denied pain, denies chills
Pt A&O x 2-3, confused. Pt perseverating on the idea that this is not the room that he has been in since admission. Vital signs as documented. As per staff, pt is more confused since his return from dialysis.

## 2023-01-10 NOTE — PROGRESS NOTE ADULT - SUBJECTIVE AND OBJECTIVE BOX
Stable on HD    Vital Signs Last 24 Hrs  T(C): 37 (01-10-23 @ 19:50), Max: 37 (01-10-23 @ 19:50)  T(F): 98.6 (01-10-23 @ 19:50), Max: 98.6 (01-10-23 @ 19:50)  HR: 93 (01-10-23 @ 21:33) (73 - 93)  BP: 140/75 (01-10-23 @ 21:33) (118/69 - 169/76)  RR: 16 (01-10-23 @ 21:33) (15 - 16)  SpO2: 96% (01-10-23 @ 21:33) (96% - 100%)    I&O's Detail    09 Jan 2023 07:01  -  10 Adrian 2023 07:00  --------------------------------------------------------  IN:    Oral Fluid: 480 mL  Total IN: 480 mL    OUT:    Voided (mL): 1100 mL  Total OUT: 1100 mL    Total NET: -620 mL    10 Adrian 2023 07:01  -  10 Adrian 2023 21:55  --------------------------------------------------------  IN:    Other (mL): 800 mL  Total IN: 800 mL    OUT:    Other (mL): 2800 mL  Total OUT: 2800 mL    Total NET: -2000 mL    s1s2  b/l air entry  soft, ND  no edema                                                                   8.0    9.70  )-----------( 398      ( 10 Adrian 2023 14:44 )             25.0     10 Adrian 2023 14:44    131    |  93     |  83     ----------------------------<  242    4.6     |  24     |  5.59     Ca    8.7        10 Adrian 2023 14:44  Phos  4.8       10 Adrian 2023 14:44    acetaminophen     Tablet .. 650 milliGRAM(s) Oral every 6 hours PRN  allopurinol 100 milliGRAM(s) Oral <User Schedule>  aspirin  chewable 81 milliGRAM(s) Oral daily  atorvastatin 80 milliGRAM(s) Oral at bedtime  carvedilol 3.125 milliGRAM(s) Oral every 12 hours  chlorhexidine 2% Cloths 1 Application(s) Topical daily  clopidogrel Tablet 75 milliGRAM(s) Oral daily  dextrose 5%. 1000 milliLiter(s) IV Continuous <Continuous>  dextrose 5%. 1000 milliLiter(s) IV Continuous <Continuous>  dextrose 50% Injectable 25 Gram(s) IV Push once  dextrose 50% Injectable 12.5 Gram(s) IV Push once  dextrose 50% Injectable 25 Gram(s) IV Push once  dextrose Oral Gel 15 Gram(s) Oral once PRN  epoetin wayne-epbx (RETACRIT) Injectable 50844 Unit(s) IV Push <User Schedule>  famotidine    Tablet 10 milliGRAM(s) Oral daily  folic acid 1 milliGRAM(s) Oral daily  gabapentin 300 milliGRAM(s) Oral at bedtime  glucagon  Injectable 1 milliGRAM(s) IntraMuscular once  heparin   Injectable 5000 Unit(s) SubCutaneous every 8 hours  insulin glargine Injectable (LANTUS) 16 Unit(s) SubCutaneous at bedtime  insulin lispro (ADMELOG) corrective regimen sliding scale   SubCutaneous three times a day with meals  insulin lispro (ADMELOG) corrective regimen sliding scale   SubCutaneous at bedtime  lidocaine/prilocaine Cream 1 Application(s) Topical <User Schedule> PRN  melatonin 3 milliGRAM(s) Oral at bedtime PRN  polyethylene glycol 3350 17 Gram(s) Oral two times a day  predniSONE   Tablet   Oral   senna 2 Tablet(s) Oral at bedtime  tamsulosin 0.4 milliGRAM(s) Oral at bedtime  valsartan 40 milliGRAM(s) Oral daily  zinc oxide 40% Paste 1 Application(s) Topical daily    A/P:    DM, HTN, CM  S/p SDH  ESRD on HD TTS  2 kg fluid removal w/HD today  Epoetin w/HD  Rehab    [phone]
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
## 2023-01-10 NOTE — PROVIDER CONTACT NOTE (CHANGE IN STATUS NOTIFICATION) - BACKGROUND
SDH, DM2, BPH, CAD, HTN, CKD, hepatitis, GERD, Gout, ERD
Pt admitted s/p fall down the stairs, with SDH. Hx CAD, BPH, MI, CKD on dialysis (received today), DM2

## 2023-01-10 NOTE — PROGRESS NOTE ADULT - SUBJECTIVE AND OBJECTIVE BOX
Patient is a 62y old  Male who presents with a chief complaint of Traumatic SDH     Reports having improvement in his pain  Feels very hungry  Denies chest pain, SOB  Tolerating diet    Patient seen and examined at bedside.    ALLERGIES:  No Known Drug Allergies  shellfish (Unknown)    MEDICATIONS  (STANDING):  allopurinol 100 milliGRAM(s) Oral <User Schedule>  aspirin  chewable 81 milliGRAM(s) Oral daily  atorvastatin 80 milliGRAM(s) Oral at bedtime  carvedilol 3.125 milliGRAM(s) Oral every 12 hours  chlorhexidine 2% Cloths 1 Application(s) Topical daily  clopidogrel Tablet 75 milliGRAM(s) Oral daily  colchicine 0.6 milliGRAM(s) Oral two times a day  dextrose 5%. 1000 milliLiter(s) (50 mL/Hr) IV Continuous <Continuous>  dextrose 5%. 1000 milliLiter(s) (100 mL/Hr) IV Continuous <Continuous>  dextrose 50% Injectable 12.5 Gram(s) IV Push once  dextrose 50% Injectable 25 Gram(s) IV Push once  dextrose 50% Injectable 25 Gram(s) IV Push once  epoetin wayne-epbx (RETACRIT) Injectable 04477 Unit(s) IV Push <User Schedule>  famotidine    Tablet 10 milliGRAM(s) Oral daily  folic acid 1 milliGRAM(s) Oral daily  gabapentin 200 milliGRAM(s) Oral at bedtime  gabapentin 100 milliGRAM(s) Oral daily  glucagon  Injectable 1 milliGRAM(s) IntraMuscular once  heparin   Injectable 5000 Unit(s) SubCutaneous every 8 hours  insulin glargine Injectable (LANTUS) 12 Unit(s) SubCutaneous at bedtime  insulin lispro (ADMELOG) corrective regimen sliding scale   SubCutaneous three times a day with meals  insulin lispro (ADMELOG) corrective regimen sliding scale   SubCutaneous at bedtime  polyethylene glycol 3350 17 Gram(s) Oral two times a day  predniSONE   Tablet   Oral   senna 2 Tablet(s) Oral at bedtime  tamsulosin 0.4 milliGRAM(s) Oral at bedtime  valsartan 40 milliGRAM(s) Oral daily  zinc oxide 40% Paste 1 Application(s) Topical daily    MEDICATIONS  (PRN):  acetaminophen     Tablet .. 650 milliGRAM(s) Oral every 6 hours PRN Temp greater or equal to 38C (100.4F), Mild Pain (1 - 3)  dextrose Oral Gel 15 Gram(s) Oral once PRN Blood Glucose LESS THAN 70 milliGRAM(s)/deciliter  melatonin 3 milliGRAM(s) Oral at bedtime PRN Insomnia    VVital Signs Last 24 Hrs  T(C): 36.6 (10 Adrian 2023 07:55), Max: 36.6 (09 Jan 2023 17:40)  T(F): 97.9 (10 Adrian 2023 07:55), Max: 97.9 (09 Jan 2023 17:40)  HR: 83 (10 Adrian 2023 07:55) (73 - 83)  BP: 159/84 (10 Adrian 2023 07:55) (141/65 - 162/90)  RR: 16 (10 Adrian 2023 07:55) (15 - 16)  SpO2: 99% (10 Adrian 2023 07:55) (98% - 99%)    Parameters below as of 10 Adrian 2023 07:55  Patient On (Oxygen Delivery Method): room air    PHYSICAL EXAM:  General: NAD, A/O x 2, appears weak, comfortable, speaking in full sentences  ENT: MMM, no scleral icterus  Neck: Supple, No JVD, no thyroidomegaly  Lungs: Clear to auscultation bilaterally, no wheezes, no rales, no rhonchi, good inspiratory effort  Cardio: RRR, S1/S2, No murmurs  Abdomen: Soft, Nontender, Nondistended; Bowel sounds present  Extremities: No calf tenderness, No pitting edema, no skin changes    LABS:                        8.5    5.37  )-----------( 358      ( 09 Jan 2023 06:07 )             27.2       01-09    136  |  96  |  56  ----------------------------<  269  4.0   |  29  |  5.09    Ca    9.0      09 Jan 2023 06:07  Phos  4.7     01-07    POCT Blood Glucose.: 305 mg/dL (09 Jan 2023 12:18)  POCT Blood Glucose.: 235 mg/dL (09 Jan 2023 08:17)  POCT Blood Glucose.: 293 mg/dL (08 Jan 2023 21:04)  POCT Blood Glucose.: 259 mg/dL (08 Jan 2023 16:49)    COVID-19 PCR: Anastasiia (12-30-22 @ 09:48)  COVID-19 PCR: NotDetec (12-25-22 @ 23:02)  COVID-19 PCR: Detected (12-25-22 @ 10:37)  COVID-19 PCR: NotDetec (12-21-22 @ 22:30)  COVID-19 PCR: Detected (12-21-22 @ 15:17)  COVID-19 PCR: NotDetec (12-30-22 @ 09:48)  COVID-19 PCR: NotDetec (12-25-22 @ 23:02)  COVID-19 PCR: Detected (12-25-22 @ 10:37)  COVID-19 PCR: NotDetec (12-21-22 @ 22:30)  COVID-19 PCR: Detected (12-21-22 @ 15:17)  COVID-19 PCR: NotDetec (12-12-22 @ 19:18)  COVID-19 PCR: Detected (12-12-22 @ 17:22)  COVID-19 PCR: NotDetec (11-06-22 @ 08:08)  COVID-19 PCR: NotDetec (11-02-22 @ 07:23)  COVID-19 PCR: NotDetec (10-27-22 @ 09:26)

## 2023-01-10 NOTE — PROVIDER CONTACT NOTE (CHANGE IN STATUS NOTIFICATION) - SITUATION
patient noted with rectal fever of 101.
pt noted with increased confusion since dialysis this evening as per staff

## 2023-01-10 NOTE — PROGRESS NOTE ADULT - SUBJECTIVE AND OBJECTIVE BOX
SUBJECTIVE/OBJECTIVE: Patient seen and examined, endorses sharp pain in the legs, R>L, present at night. Also endorsing some pain in the L hand MCP's, being treated for gout.    REVIEW OF SYSTEMS  Denies SOB, Abdominal pain, N/V    VITALS  62y  Vital Signs Last 24 Hrs  T(C): 36.6 (10 Adrian 2023 07:55), Max: 36.6 (2023 17:40)  T(F): 97.9 (10 Adrian 2023 07:55), Max: 97.9 (2023 17:40)  HR: 83 (10 Adrian 2023 07:55) (73 - 83)  BP: 159/84 (10 Adrian 2023 07:55) (141/65 - 162/90)  RR: 16 (10 Adrian 2023 07:55) (15 - 16)  SpO2: 99% (10 Adrian 2023 07:55) (98% - 99%)    Parameters below as of 10 Adrian 2023 07:55  Patient On (Oxygen Delivery Method): room air      Daily     Daily Weight in k.3 (2023 22:28)      PHYSICAL EXAM  Constitutional - NAD, Comfortable  HEENT - NCAT, EOMI  Chest - CTA bilaterally,   Cardiovascular - RRR,  warm well perfused  Abdomen - , Soft, NTND  Extremities - No edema, left  calf tenderness; + painful ROM KF/KE bilateral, + L dorsal wrist pain on palpation  Neurologic Exam -                    Cognitive - Awake, Alert, AAO x 3--     Attention: distractible      Memory: delayed processing     Motor -                     LEFT    UE - ShAB 3/5, EF 3/5, EE 3/5, WE 3/5,  3/5.           Antigravity+  Does not resist much force if any- allodynia to pressure of hand on arm vs weakness worsened in PF                    RIGHT UE - ShAB 4-/5, EF 4-/5, EE 4-/5, WE 4-/5,  3/5        ^ as above.                    LEFT    LE - HF 1/5, KE 2/5, DF 1/5, PF 1/5      RECENT LABS:                        8.5    5.37  )-----------( 358      ( 2023 06:07 )             27.2     01-09    136  |  96  |  56<H>  ----------------------------<  269<H>  4.0   |  29  |  5.09<H>    Ca    9.0      2023 06:07        CAPILLARY BLOOD GLUCOSE  POCT Blood Glucose.: 298 mg/dL (10 Adrian 2023 12:00)  POCT Blood Glucose.: 111 mg/dL (10 Adrian 2023 08:03)  POCT Blood Glucose.: 192 mg/dL (2023 21:45)  POCT Blood Glucose.: 302 mg/dL (2023 16:50)      MEDICATIONS:  MEDICATIONS  (STANDING):  allopurinol 100 milliGRAM(s) Oral <User Schedule>  aspirin  chewable 81 milliGRAM(s) Oral daily  atorvastatin 80 milliGRAM(s) Oral at bedtime  carvedilol 3.125 milliGRAM(s) Oral every 12 hours  chlorhexidine 2% Cloths 1 Application(s) Topical daily  clopidogrel Tablet 75 milliGRAM(s) Oral daily  dextrose 5%. 1000 milliLiter(s) (50 mL/Hr) IV Continuous <Continuous>  dextrose 5%. 1000 milliLiter(s) (100 mL/Hr) IV Continuous <Continuous>  dextrose 50% Injectable 25 Gram(s) IV Push once  dextrose 50% Injectable 12.5 Gram(s) IV Push once  dextrose 50% Injectable 25 Gram(s) IV Push once  epoetin wayne-epbx (RETACRIT) Injectable 42980 Unit(s) IV Push <User Schedule>  famotidine    Tablet 10 milliGRAM(s) Oral daily  folic acid 1 milliGRAM(s) Oral daily  gabapentin 300 milliGRAM(s) Oral at bedtime  glucagon  Injectable 1 milliGRAM(s) IntraMuscular once  heparin   Injectable 5000 Unit(s) SubCutaneous every 8 hours  insulin glargine Injectable (LANTUS) 16 Unit(s) SubCutaneous at bedtime  insulin lispro (ADMELOG) corrective regimen sliding scale   SubCutaneous at bedtime  insulin lispro (ADMELOG) corrective regimen sliding scale   SubCutaneous three times a day with meals  polyethylene glycol 3350 17 Gram(s) Oral two times a day  predniSONE   Tablet   Oral   senna 2 Tablet(s) Oral at bedtime  tamsulosin 0.4 milliGRAM(s) Oral at bedtime  valsartan 40 milliGRAM(s) Oral daily  zinc oxide 40% Paste 1 Application(s) Topical daily    MEDICATIONS  (PRN):  acetaminophen     Tablet .. 650 milliGRAM(s) Oral every 6 hours PRN Temp greater or equal to 38C (100.4F), Mild Pain (1 - 3)  dextrose Oral Gel 15 Gram(s) Oral once PRN Blood Glucose LESS THAN 70 milliGRAM(s)/deciliter  lidocaine/prilocaine Cream 1 Application(s) Topical <User Schedule> PRN on dialysis days  melatonin 3 milliGRAM(s) Oral at bedtime PRN Insomnia     SUBJECTIVE/OBJECTIVE: Patient seen and examined, endorses sharp pain in the legs, R>L, present at night. Also endorsing some pain in the L hand MCP's, being treated for gout.     REVIEW OF SYSTEMS  Denies SOB, Abdominal pain, N/V    VITALS  62y  Vital Signs Last 24 Hrs  T(C): 36.6 (10 Adrian 2023 07:55), Max: 36.6 (2023 17:40)  T(F): 97.9 (10 Adrian 2023 07:55), Max: 97.9 (2023 17:40)  HR: 83 (10 Adrian 2023 07:55) (73 - 83)  BP: 159/84 (10 Adrian 2023 07:55) (141/65 - 162/90)  RR: 16 (10 Adrian 2023 07:55) (15 - 16)  SpO2: 99% (10 Adrian 2023 07:55) (98% - 99%)    Parameters below as of 10 Adrian 2023 07:55  Patient On (Oxygen Delivery Method): room air      Daily     Daily Weight in k.3 (2023 22:28)      PHYSICAL EXAM  Constitutional - NAD, Comfortable  HEENT - NCAT, EOMI  Chest - CTA bilaterally,   Cardiovascular - RRR,  warm well perfused  Abdomen - , Soft, NTND  Extremities - No edema, left  calf tenderness; + painful ROM KF/KE bilateral, + L dorsal wrist pain on palpation  Neurologic Exam -                    Cognitive - Awake, Alert, AAO x 3--     Attention: distractible      Memory: delayed processing     Motor -                     LEFT    UE - ShAB 3/5, EF 3/5, EE 3/5, WE 3/5,  3/5.           Antigravity+  Does not resist much force if any- allodynia to pressure of hand on arm vs weakness worsened in PF                    RIGHT UE - ShAB 4-/5, EF 4-/5, EE 4-/5, WE 4-/5,  3/5        ^ as above.                    LEFT    LE - HF 1/5, KE 2/5, DF 1/5, PF 1/5      RECENT LABS:    LAB                        8.0    9.70  )-----------( 398      ( 10 Adrian 2023 14:44 )             25.0                           8.5    5.37  )-----------( 358      ( 2023 06:07 )             27.2     01-10    131<L>  |  93<L>  |  83<H>  ----------------------------<  242<H>  4.6   |  24  |  5.59<H>    Ca    8.7      10 Adrian 2023 14:44  Phos  4.8     01-10    01-09    136  |  96  |  56<H>  ----------------------------<  269<H>  4.0   |  29  |  5.09<H>    Ca    9.0      2023 06:07        CAPILLARY BLOOD GLUCOSE  POCT Blood Glucose.: 298 mg/dL (10 Adrian 2023 12:00)  POCT Blood Glucose.: 111 mg/dL (10 Adrian 2023 08:03)  POCT Blood Glucose.: 192 mg/dL (2023 21:45)  POCT Blood Glucose.: 302 mg/dL (2023 16:50)      MEDICATIONS:  MEDICATIONS  (STANDING):  allopurinol 100 milliGRAM(s) Oral <User Schedule>  aspirin  chewable 81 milliGRAM(s) Oral daily  atorvastatin 80 milliGRAM(s) Oral at bedtime  carvedilol 3.125 milliGRAM(s) Oral every 12 hours  chlorhexidine 2% Cloths 1 Application(s) Topical daily  clopidogrel Tablet 75 milliGRAM(s) Oral daily  dextrose 5%. 1000 milliLiter(s) (50 mL/Hr) IV Continuous <Continuous>  dextrose 5%. 1000 milliLiter(s) (100 mL/Hr) IV Continuous <Continuous>  dextrose 50% Injectable 25 Gram(s) IV Push once  dextrose 50% Injectable 12.5 Gram(s) IV Push once  dextrose 50% Injectable 25 Gram(s) IV Push once  epoetin wayne-epbx (RETACRIT) Injectable 26509 Unit(s) IV Push <User Schedule>  famotidine    Tablet 10 milliGRAM(s) Oral daily  folic acid 1 milliGRAM(s) Oral daily  gabapentin 300 milliGRAM(s) Oral at bedtime  glucagon  Injectable 1 milliGRAM(s) IntraMuscular once  heparin   Injectable 5000 Unit(s) SubCutaneous every 8 hours  insulin glargine Injectable (LANTUS) 16 Unit(s) SubCutaneous at bedtime  insulin lispro (ADMELOG) corrective regimen sliding scale   SubCutaneous at bedtime  insulin lispro (ADMELOG) corrective regimen sliding scale   SubCutaneous three times a day with meals  polyethylene glycol 3350 17 Gram(s) Oral two times a day  predniSONE   Tablet   Oral   senna 2 Tablet(s) Oral at bedtime  tamsulosin 0.4 milliGRAM(s) Oral at bedtime  valsartan 40 milliGRAM(s) Oral daily  zinc oxide 40% Paste 1 Application(s) Topical daily    MEDICATIONS  (PRN):  acetaminophen     Tablet .. 650 milliGRAM(s) Oral every 6 hours PRN Temp greater or equal to 38C (100.4F), Mild Pain (1 - 3)  dextrose Oral Gel 15 Gram(s) Oral once PRN Blood Glucose LESS THAN 70 milliGRAM(s)/deciliter  lidocaine/prilocaine Cream 1 Application(s) Topical <User Schedule> PRN on dialysis days  melatonin 3 milliGRAM(s) Oral at bedtime PRN Insomnia

## 2023-01-10 NOTE — CHART NOTE - NSCHARTNOTEFT_GEN_A_CORE
Nutrition Follow Up Note  Hospital Course   (Per Electronic Medical Record)    Source:  Patient [X]  Medical Record [X]      Diet: Diet, Minced and Moist:   Consistent Carbohydrate {Evening Snack}  For patients receiving Renal Replacement - No Protein Restr, No Conc K, No Conc Phos, Low Sodium (01-04-23 @ 14:55) [Active]    Patient w/ adequate appetite/intake at this time consuming % of meal stay and reporting hunger. Requesting variety in meal tray. Reviewed preferences and menu alternatives. No issues w/ chewing and swallowing current diet texture, patient w/ questions about upgrading diet texture w/ follow up with SLP. Denies N/V/D, however w/ fecal incontinence last BM 1/9 Per nursing flowsheets.     Enteral/Parenteral Nutrition: Not Applicable    Current Weight: 157.1lb on 1/9    Pertinent Medications: MEDICATIONS  (STANDING):  allopurinol 100 milliGRAM(s) Oral <User Schedule>  aspirin  chewable 81 milliGRAM(s) Oral daily  atorvastatin 80 milliGRAM(s) Oral at bedtime  carvedilol 3.125 milliGRAM(s) Oral every 12 hours  chlorhexidine 2% Cloths 1 Application(s) Topical daily  clopidogrel Tablet 75 milliGRAM(s) Oral daily  dextrose 5%. 1000 milliLiter(s) (100 mL/Hr) IV Continuous <Continuous>  dextrose 5%. 1000 milliLiter(s) (50 mL/Hr) IV Continuous <Continuous>  dextrose 50% Injectable 25 Gram(s) IV Push once  dextrose 50% Injectable 12.5 Gram(s) IV Push once  dextrose 50% Injectable 25 Gram(s) IV Push once  epoetin wayne-epbx (RETACRIT) Injectable 36104 Unit(s) IV Push <User Schedule>  famotidine    Tablet 10 milliGRAM(s) Oral daily  folic acid 1 milliGRAM(s) Oral daily  gabapentin 300 milliGRAM(s) Oral at bedtime  glucagon  Injectable 1 milliGRAM(s) IntraMuscular once  heparin   Injectable 5000 Unit(s) SubCutaneous every 8 hours  insulin glargine Injectable (LANTUS) 16 Unit(s) SubCutaneous at bedtime  insulin lispro (ADMELOG) corrective regimen sliding scale   SubCutaneous three times a day with meals  insulin lispro (ADMELOG) corrective regimen sliding scale   SubCutaneous at bedtime  polyethylene glycol 3350 17 Gram(s) Oral two times a day  predniSONE   Tablet   Oral   senna 2 Tablet(s) Oral at bedtime  tamsulosin 0.4 milliGRAM(s) Oral at bedtime  valsartan 40 milliGRAM(s) Oral daily  zinc oxide 40% Paste 1 Application(s) Topical daily    MEDICATIONS  (PRN):  acetaminophen     Tablet .. 650 milliGRAM(s) Oral every 6 hours PRN Temp greater or equal to 38C (100.4F), Mild Pain (1 - 3)  dextrose Oral Gel 15 Gram(s) Oral once PRN Blood Glucose LESS THAN 70 milliGRAM(s)/deciliter  lidocaine/prilocaine Cream 1 Application(s) Topical <User Schedule> PRN on dialysis days  melatonin 3 milliGRAM(s) Oral at bedtime PRN Insomnia      Pertinent Labs:  01-09 Na136 mmol/L Glu 269 mg/dL<H> K+ 4.0 mmol/L Cr  5.09 mg/dL<H> BUN 56 mg/dL<H> 01-07 Phos 4.7 mg/dL<H>    Skin: DTI Pressure Injury left heel    Edema: No edema noted per nursing flowsheet    Last Bowel Movement: on 1/9 Per nursing flowsheets     Estimated Needs: [X] no change since previous assessment    PREVIOUS NUTRITION DIAGNOSIS:    1. Altered nutrition related lab values  NUTRITION DIAGNOSIS is :  (X)  Ongoing          NEW NUTRITION DIAGNOSIS: Increased nutrient needs related to increased demand for wound healing as evidenced by stage II pressure ulcer, DTI left heel, +HD    Nutrition Diagnosis is [X] Ongoing     New Nutrition Diagnosis: [X] Not Applicable    Interventions:   1. Recommend adding Nephrovite & Vitamin C to meet increased needs  2. x2 protein at meals  3. Diet consistency per speech therapy. Assistance with meals.   4. Obtain and honor food preferences as able       Monitoring & Evaluation:   [X] Weights   [X] PO Intake   [X] Skin Integrity   [X] Follow Up (Per Protocol)  [X] Tolerance to Diet Prescription   [X] Other: Labs     Registered Dietitian/Nutritionist Remains Available.  Hannah Mendoza RD, MS, CDN    Phone# [phone] Nutrition Follow Up Note  Hospital Course   (Per Electronic Medical Record)    Source:  Patient [X]  Medical Record [X]      Diet: Diet, Minced and Moist:   Consistent Carbohydrate {Evening Snack}  For patients receiving Renal Replacement - No Protein Restr, No Conc K, No Conc Phos, Low Sodium (01-04-23 @ 14:55) [Active]    Patient w/ adequate appetite/intake at this time consuming % of meal stay and reporting hunger. Requesting variety in meal tray. Reviewed preferences and menu alternatives. No issues w/ chewing and swallowing current diet texture, patient w/ questions about upgrading diet texture w/ follow up with SLP. Denies N/V/D, however w/ fecal incontinence last BM 1/9 Per nursing flowsheets. Provided education on IDDSI minced and moist diet texture and Renal/CC nutrition therapy.     Enteral/Parenteral Nutrition: Not Applicable    Current Weight: 157.1lb on 1/9    Pertinent Medications: MEDICATIONS  (STANDING):  allopurinol 100 milliGRAM(s) Oral <User Schedule>  aspirin  chewable 81 milliGRAM(s) Oral daily  atorvastatin 80 milliGRAM(s) Oral at bedtime  carvedilol 3.125 milliGRAM(s) Oral every 12 hours  chlorhexidine 2% Cloths 1 Application(s) Topical daily  clopidogrel Tablet 75 milliGRAM(s) Oral daily  dextrose 5%. 1000 milliLiter(s) (100 mL/Hr) IV Continuous <Continuous>  dextrose 5%. 1000 milliLiter(s) (50 mL/Hr) IV Continuous <Continuous>  dextrose 50% Injectable 25 Gram(s) IV Push once  dextrose 50% Injectable 12.5 Gram(s) IV Push once  dextrose 50% Injectable 25 Gram(s) IV Push once  epoetin wayne-epbx (RETACRIT) Injectable 82459 Unit(s) IV Push <User Schedule>  famotidine    Tablet 10 milliGRAM(s) Oral daily  folic acid 1 milliGRAM(s) Oral daily  gabapentin 300 milliGRAM(s) Oral at bedtime  glucagon  Injectable 1 milliGRAM(s) IntraMuscular once  heparin   Injectable 5000 Unit(s) SubCutaneous every 8 hours  insulin glargine Injectable (LANTUS) 16 Unit(s) SubCutaneous at bedtime  insulin lispro (ADMELOG) corrective regimen sliding scale   SubCutaneous three times a day with meals  insulin lispro (ADMELOG) corrective regimen sliding scale   SubCutaneous at bedtime  polyethylene glycol 3350 17 Gram(s) Oral two times a day  predniSONE   Tablet   Oral   senna 2 Tablet(s) Oral at bedtime  tamsulosin 0.4 milliGRAM(s) Oral at bedtime  valsartan 40 milliGRAM(s) Oral daily  zinc oxide 40% Paste 1 Application(s) Topical daily    MEDICATIONS  (PRN):  acetaminophen     Tablet .. 650 milliGRAM(s) Oral every 6 hours PRN Temp greater or equal to 38C (100.4F), Mild Pain (1 - 3)  dextrose Oral Gel 15 Gram(s) Oral once PRN Blood Glucose LESS THAN 70 milliGRAM(s)/deciliter  lidocaine/prilocaine Cream 1 Application(s) Topical <User Schedule> PRN on dialysis days  melatonin 3 milliGRAM(s) Oral at bedtime PRN Insomnia      Pertinent Labs:  01-09 Na136 mmol/L Glu 269 mg/dL<H> K+ 4.0 mmol/L Cr  5.09 mg/dL<H> BUN 56 mg/dL<H> 01-07 Phos 4.7 mg/dL<H>    Skin: DTI Pressure Injury left heel    Edema: No edema noted per nursing flowsheet    Last Bowel Movement: on 1/9 Per nursing flowsheets     Estimated Needs: [X] no change since previous assessment    PREVIOUS NUTRITION DIAGNOSIS:    1. Altered nutrition related lab values  NUTRITION DIAGNOSIS is :  (X)  Ongoing          NEW NUTRITION DIAGNOSIS: Increased nutrient needs related to increased demand for wound healing as evidenced by stage II pressure ulcer, DTI left heel, +HD    Nutrition Diagnosis is [X] Ongoing     New Nutrition Diagnosis: [X] Not Applicable    Interventions:   1. Recommend adding Nephrovite & Vitamin C to meet increased needs  2. x2 protein at meals  3. Diet consistency per speech therapy. Assistance with meals.   4. Obtain and honor food preferences as able       Monitoring & Evaluation:   [X] Weights   [X] PO Intake   [X] Skin Integrity   [X] Follow Up (Per Protocol)  [X] Tolerance to Diet Prescription   [X] Other: Labs     Registered Dietitian/Nutritionist Remains Available.  Hannah Mendoza RD, MS, CDN    Phone# [phone]
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
## 2023-01-11 LAB
GLUCOSE BLDC GLUCOMTR-MCNC: 193 MG/DL — HIGH (ref 70–99)
GLUCOSE BLDC GLUCOMTR-MCNC: 222 MG/DL — HIGH (ref 70–99)
GLUCOSE BLDC GLUCOMTR-MCNC: 256 MG/DL — HIGH (ref 70–99)
GLUCOSE BLDC GLUCOMTR-MCNC: 99 MG/DL — SIGNIFICANT CHANGE UP (ref 70–99)

## 2023-01-11 PROCEDURE — 99232 SBSQ HOSP IP/OBS MODERATE 35: CPT

## 2023-01-11 RX ADMIN — CARVEDILOL PHOSPHATE 3.12 MILLIGRAM(S): 80 CAPSULE, EXTENDED RELEASE ORAL at 17:53

## 2023-01-11 RX ADMIN — TAMSULOSIN HYDROCHLORIDE 0.4 MILLIGRAM(S): 0.4 CAPSULE ORAL at 21:35

## 2023-01-11 RX ADMIN — HEPARIN SODIUM 5000 UNIT(S): 5000 INJECTION INTRAVENOUS; SUBCUTANEOUS at 13:01

## 2023-01-11 RX ADMIN — INSULIN GLARGINE 16 UNIT(S): 100 INJECTION, SOLUTION SUBCUTANEOUS at 21:36

## 2023-01-11 RX ADMIN — FAMOTIDINE 10 MILLIGRAM(S): 10 INJECTION INTRAVENOUS at 11:27

## 2023-01-11 RX ADMIN — Medication 20 MILLIGRAM(S): at 05:22

## 2023-01-11 RX ADMIN — CLOPIDOGREL BISULFATE 75 MILLIGRAM(S): 75 TABLET, FILM COATED ORAL at 11:28

## 2023-01-11 RX ADMIN — HEPARIN SODIUM 5000 UNIT(S): 5000 INJECTION INTRAVENOUS; SUBCUTANEOUS at 21:36

## 2023-01-11 RX ADMIN — GABAPENTIN 300 MILLIGRAM(S): 400 CAPSULE ORAL at 21:35

## 2023-01-11 RX ADMIN — ZINC OXIDE 1 APPLICATION(S): 200 OINTMENT TOPICAL at 11:26

## 2023-01-11 RX ADMIN — HEPARIN SODIUM 5000 UNIT(S): 5000 INJECTION INTRAVENOUS; SUBCUTANEOUS at 05:22

## 2023-01-11 RX ADMIN — SENNA PLUS 2 TABLET(S): 8.6 TABLET ORAL at 21:36

## 2023-01-11 RX ADMIN — CARVEDILOL PHOSPHATE 3.12 MILLIGRAM(S): 80 CAPSULE, EXTENDED RELEASE ORAL at 05:22

## 2023-01-11 RX ADMIN — Medication 8: at 11:36

## 2023-01-11 RX ADMIN — ATORVASTATIN CALCIUM 80 MILLIGRAM(S): 80 TABLET, FILM COATED ORAL at 21:36

## 2023-01-11 RX ADMIN — CHLORHEXIDINE GLUCONATE 1 APPLICATION(S): 213 SOLUTION TOPICAL at 11:28

## 2023-01-11 RX ADMIN — Medication 100 MILLIGRAM(S): at 08:44

## 2023-01-11 RX ADMIN — VALSARTAN 40 MILLIGRAM(S): 80 TABLET ORAL at 05:22

## 2023-01-11 RX ADMIN — Medication 81 MILLIGRAM(S): at 11:28

## 2023-01-11 RX ADMIN — Medication 1 MILLIGRAM(S): at 11:28

## 2023-01-11 RX ADMIN — Medication 4: at 16:37
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
## 2023-01-11 NOTE — PROGRESS NOTE ADULT - SUBJECTIVE AND OBJECTIVE BOX
Patient is a 62y old  Male who presents with a chief complaint of Traumatic SDH (11 Jan 2023 09:52)    Tolerated HD yesterday;; nursing notes that he was confused after HD  Patient denies chest pain, SOB, N/V, abd pain, tolerating diet  Reports headache improved    Patient seen and examined at bedside.    ALLERGIES:  No Known Drug Allergies  shellfish (Unknown)    MEDICATIONS  (STANDING):  allopurinol 100 milliGRAM(s) Oral <User Schedule>  aspirin  chewable 81 milliGRAM(s) Oral daily  atorvastatin 80 milliGRAM(s) Oral at bedtime  carvedilol 3.125 milliGRAM(s) Oral every 12 hours  chlorhexidine 2% Cloths 1 Application(s) Topical daily  clopidogrel Tablet 75 milliGRAM(s) Oral daily  dextrose 5%. 1000 milliLiter(s) (50 mL/Hr) IV Continuous <Continuous>  dextrose 5%. 1000 milliLiter(s) (100 mL/Hr) IV Continuous <Continuous>  dextrose 50% Injectable 25 Gram(s) IV Push once  dextrose 50% Injectable 12.5 Gram(s) IV Push once  dextrose 50% Injectable 25 Gram(s) IV Push once  epoetin wayne-epbx (RETACRIT) Injectable 47239 Unit(s) IV Push <User Schedule>  famotidine    Tablet 10 milliGRAM(s) Oral daily  folic acid 1 milliGRAM(s) Oral daily  gabapentin 300 milliGRAM(s) Oral at bedtime  glucagon  Injectable 1 milliGRAM(s) IntraMuscular once  heparin   Injectable 5000 Unit(s) SubCutaneous every 8 hours  insulin glargine Injectable (LANTUS) 16 Unit(s) SubCutaneous at bedtime  insulin lispro (ADMELOG) corrective regimen sliding scale   SubCutaneous three times a day with meals  insulin lispro (ADMELOG) corrective regimen sliding scale   SubCutaneous at bedtime  polyethylene glycol 3350 17 Gram(s) Oral two times a day  predniSONE   Tablet   Oral   senna 2 Tablet(s) Oral at bedtime  tamsulosin 0.4 milliGRAM(s) Oral at bedtime  valsartan 40 milliGRAM(s) Oral daily  zinc oxide 40% Paste 1 Application(s) Topical daily    MEDICATIONS  (PRN):  acetaminophen     Tablet .. 650 milliGRAM(s) Oral every 6 hours PRN Temp greater or equal to 38C (100.4F), Mild Pain (1 - 3)  dextrose Oral Gel 15 Gram(s) Oral once PRN Blood Glucose LESS THAN 70 milliGRAM(s)/deciliter  lidocaine/prilocaine Cream 1 Application(s) Topical <User Schedule> PRN on dialysis days  melatonin 3 milliGRAM(s) Oral at bedtime PRN Insomnia    Vital Signs Last 24 Hrs  T(F): 98.2 (11 Jan 2023 08:16), Max: 98.6 (10 Adrian 2023 19:50)  HR: 84 (11 Jan 2023 08:16) (80 - 93)  BP: 120/68 (11 Jan 2023 08:16) (118/69 - 169/76)  RR: 16 (11 Jan 2023 08:16) (15 - 16)  SpO2: 98% (11 Jan 2023 08:16) (96% - 100%)  I&O's Summary    10 Adrian 2023 07:01  -  11 Jan 2023 07:00  --------------------------------------------------------  IN: 800 mL / OUT: 2800 mL / NET: -2000 mL    PHYSICAL EXAM:  General: NAD, A/O x 3  ENT: MMM, no scleral icterus  Neck: Supple, No JVD, no thyroidomegaly  Lungs: Clear to auscultation bilaterally, no wheezes, no rales, no rhonchi, good inspiratory effort  Cardio: RRR, S1/S2, No murmurs  Abdomen: Soft, Nontender, Nondistended; Bowel sounds present  Extremities: No calf tenderness, No pitting edema, no skin changes    LABS:                        8.0    9.70  )-----------( 398      ( 10 Adrian 2023 14:44 )             25.0       01-10    131  |  93  |  83  ----------------------------<  242  4.6   |  24  |  5.59    Ca    8.7      10 Adrian 2023 14:44  Phos  4.8     01-10    POCT Blood Glucose.: 99 mg/dL (11 Jan 2023 08:15)  POCT Blood Glucose.: 199 mg/dL (10 Adrian 2023 21:23)  POCT Blood Glucose.: 170 mg/dL (10 Adrian 2023 18:21)  POCT Blood Glucose.: 298 mg/dL (10 Adrian 2023 12:00)    COVID-19 PCR: NotDetec (12-30-22 @ 09:48)  COVID-19 PCR: NotDetec (12-25-22 @ 23:02)  COVID-19 PCR: Detected (12-25-22 @ 10:37)  COVID-19 PCR: NotDetec (12-21-22 @ 22:30)  COVID-19 PCR: Detected (12-21-22 @ 15:17)  COVID-19 PCR: NotDetec (12-30-22 @ 09:48)  COVID-19 PCR: NotDetec (12-25-22 @ 23:02)  COVID-19 PCR: Detected (12-25-22 @ 10:37)  COVID-19 PCR: NotDetec (12-21-22 @ 22:30)  COVID-19 PCR: Detected (12-21-22 @ 15:17)  COVID-19 PCR: NotDetec (12-12-22 @ 19:18)  COVID-19 PCR: Detected (12-12-22 @ 17:22)  COVID-19 PCR: NotDetec (11-06-22 @ 08:08)  COVID-19 PCR: NotDetec (11-02-22 @ 07:23)  COVID-19 PCR: NotDetec (10-27-22 @ 09:26)
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
## 2023-01-11 NOTE — PROGRESS NOTE ADULT - SUBJECTIVE AND OBJECTIVE BOX
Resting    Vital Signs Last 24 Hrs  T(C): 36.8 (01-11-23 @ 19:37), Max: 36.8 (01-11-23 @ 08:16)  T(F): 98.3 (01-11-23 @ 19:37), Max: 98.3 (01-11-23 @ 19:37)  HR: 81 (01-11-23 @ 19:37) (80 - 85)  BP: 147/72 (01-11-23 @ 19:37) (120/68 - 147/72)  RR: 15 (01-11-23 @ 19:37) (15 - 16)  SpO2: 99% (01-11-23 @ 19:37) (98% - 99%)    I&O's Detail    10 Adrian 2023 07:01  -  11 Jan 2023 07:00  --------------------------------------------------------  IN:    Other (mL): 800 mL  Total IN: 800 mL    OUT:    Other (mL): 2800 mL  Total OUT: 2800 mL    Total NET: -2000 mL    s1s2  b/l air entry  soft, ND  no edema                                                                           8.0    9.70  )-----------( 398      ( 10 Adrian 2023 14:44 )             25.0     10 Adrian 2023 14:44    131    |  93     |  83     ----------------------------<  242    4.6     |  24     |  5.59     Ca    8.7        10 Adrian 2023 14:44  Phos  4.8       10 Adrian 2023 14:44    acetaminophen     Tablet .. 650 milliGRAM(s) Oral every 6 hours PRN  allopurinol 100 milliGRAM(s) Oral <User Schedule>  aspirin  chewable 81 milliGRAM(s) Oral daily  atorvastatin 80 milliGRAM(s) Oral at bedtime  carvedilol 3.125 milliGRAM(s) Oral every 12 hours  chlorhexidine 2% Cloths 1 Application(s) Topical daily  clopidogrel Tablet 75 milliGRAM(s) Oral daily  dextrose 5%. 1000 milliLiter(s) IV Continuous <Continuous>  dextrose 5%. 1000 milliLiter(s) IV Continuous <Continuous>  dextrose 50% Injectable 25 Gram(s) IV Push once  dextrose 50% Injectable 12.5 Gram(s) IV Push once  dextrose 50% Injectable 25 Gram(s) IV Push once  dextrose Oral Gel 15 Gram(s) Oral once PRN  epoetin wayne-epbx (RETACRIT) Injectable 57344 Unit(s) IV Push <User Schedule>  famotidine    Tablet 10 milliGRAM(s) Oral daily  folic acid 1 milliGRAM(s) Oral daily  gabapentin 300 milliGRAM(s) Oral at bedtime  glucagon  Injectable 1 milliGRAM(s) IntraMuscular once  heparin   Injectable 5000 Unit(s) SubCutaneous every 8 hours  insulin glargine Injectable (LANTUS) 16 Unit(s) SubCutaneous at bedtime  insulin lispro (ADMELOG) corrective regimen sliding scale   SubCutaneous three times a day with meals  insulin lispro (ADMELOG) corrective regimen sliding scale   SubCutaneous at bedtime  lidocaine/prilocaine Cream 1 Application(s) Topical <User Schedule> PRN  melatonin 3 milliGRAM(s) Oral at bedtime PRN  polyethylene glycol 3350 17 Gram(s) Oral two times a day  predniSONE   Tablet   Oral   senna 2 Tablet(s) Oral at bedtime  tamsulosin 0.4 milliGRAM(s) Oral at bedtime  valsartan 40 milliGRAM(s) Oral daily  zinc oxide 40% Paste 1 Application(s) Topical daily    A/P:    DM, HTN, CM  S/p SDH  ESRD on HD TTS  2 kg fluid removal w/HD as able  Epoetin w/HD  Rehab    [phone]

## 2023-01-12 LAB
ALBUMIN SERPL ELPH-MCNC: 2.4 G/DL — LOW (ref 3.3–5)
ALBUMIN SERPL ELPH-MCNC: 2.4 G/DL — LOW (ref 3.3–5)
ALP SERPL-CCNC: 122 U/L — HIGH (ref 40–120)
ALT FLD-CCNC: 18 U/L — SIGNIFICANT CHANGE UP (ref 10–45)
ANION GAP SERPL CALC-SCNC: 12 MMOL/L — SIGNIFICANT CHANGE UP (ref 5–17)
ANION GAP SERPL CALC-SCNC: 12 MMOL/L — SIGNIFICANT CHANGE UP (ref 5–17)
AST SERPL-CCNC: 16 U/L — SIGNIFICANT CHANGE UP (ref 10–40)
BILIRUB SERPL-MCNC: 0.2 MG/DL — SIGNIFICANT CHANGE UP (ref 0.2–1.2)
BUN SERPL-MCNC: 76 MG/DL — HIGH (ref 7–23)
BUN SERPL-MCNC: 76 MG/DL — HIGH (ref 7–23)
CALCIUM SERPL-MCNC: 7.9 MG/DL — LOW (ref 8.4–10.5)
CALCIUM SERPL-MCNC: 7.9 MG/DL — LOW (ref 8.4–10.5)
CHLORIDE SERPL-SCNC: 96 MMOL/L — SIGNIFICANT CHANGE UP (ref 96–108)
CHLORIDE SERPL-SCNC: 96 MMOL/L — SIGNIFICANT CHANGE UP (ref 96–108)
CO2 SERPL-SCNC: 25 MMOL/L — SIGNIFICANT CHANGE UP (ref 22–31)
CO2 SERPL-SCNC: 25 MMOL/L — SIGNIFICANT CHANGE UP (ref 22–31)
CREAT SERPL-MCNC: 4.61 MG/DL — HIGH (ref 0.5–1.3)
CREAT SERPL-MCNC: 4.61 MG/DL — HIGH (ref 0.5–1.3)
EGFR: 14 ML/MIN/1.73M2 — LOW
EGFR: 14 ML/MIN/1.73M2 — LOW
GLUCOSE BLDC GLUCOMTR-MCNC: 143 MG/DL — HIGH (ref 70–99)
GLUCOSE BLDC GLUCOMTR-MCNC: 144 MG/DL — HIGH (ref 70–99)
GLUCOSE BLDC GLUCOMTR-MCNC: 205 MG/DL — HIGH (ref 70–99)
GLUCOSE BLDC GLUCOMTR-MCNC: 304 MG/DL — HIGH (ref 70–99)
GLUCOSE SERPL-MCNC: 264 MG/DL — HIGH (ref 70–99)
GLUCOSE SERPL-MCNC: 264 MG/DL — HIGH (ref 70–99)
HCT VFR BLD CALC: 24.2 % — LOW (ref 39–50)
HGB BLD-MCNC: 7.6 G/DL — LOW (ref 13–17)
MCHC RBC-ENTMCNC: 27.7 PG — SIGNIFICANT CHANGE UP (ref 27–34)
MCHC RBC-ENTMCNC: 31.4 GM/DL — LOW (ref 32–36)
MCV RBC AUTO: 88.3 FL — SIGNIFICANT CHANGE UP (ref 80–100)
NRBC # BLD: 0 /100 WBCS — SIGNIFICANT CHANGE UP (ref 0–0)
PHOSPHATE SERPL-MCNC: 3.9 MG/DL — SIGNIFICANT CHANGE UP (ref 2.5–4.5)
PLATELET # BLD AUTO: 321 K/UL — SIGNIFICANT CHANGE UP (ref 150–400)
POTASSIUM SERPL-MCNC: 4.1 MMOL/L — SIGNIFICANT CHANGE UP (ref 3.5–5.3)
POTASSIUM SERPL-MCNC: 4.1 MMOL/L — SIGNIFICANT CHANGE UP (ref 3.5–5.3)
POTASSIUM SERPL-SCNC: 4.1 MMOL/L — SIGNIFICANT CHANGE UP (ref 3.5–5.3)
POTASSIUM SERPL-SCNC: 4.1 MMOL/L — SIGNIFICANT CHANGE UP (ref 3.5–5.3)
PROT SERPL-MCNC: 6.2 G/DL — SIGNIFICANT CHANGE UP (ref 6–8.3)
RBC # BLD: 2.74 M/UL — LOW (ref 4.2–5.8)
RBC # FLD: 15.5 % — HIGH (ref 10.3–14.5)
SODIUM SERPL-SCNC: 133 MMOL/L — LOW (ref 135–145)
SODIUM SERPL-SCNC: 133 MMOL/L — LOW (ref 135–145)
WBC # BLD: 7.89 K/UL — SIGNIFICANT CHANGE UP (ref 3.8–10.5)
WBC # FLD AUTO: 7.89 K/UL — SIGNIFICANT CHANGE UP (ref 3.8–10.5)

## 2023-01-12 PROCEDURE — 96116 NUBHVL XM PHYS/QHP 1ST HR: CPT

## 2023-01-12 PROCEDURE — 99232 SBSQ HOSP IP/OBS MODERATE 35: CPT

## 2023-01-12 RX ADMIN — FAMOTIDINE 10 MILLIGRAM(S): 10 INJECTION INTRAVENOUS at 11:31

## 2023-01-12 RX ADMIN — ZINC OXIDE 1 APPLICATION(S): 200 OINTMENT TOPICAL at 11:30

## 2023-01-12 RX ADMIN — CLOPIDOGREL BISULFATE 75 MILLIGRAM(S): 75 TABLET, FILM COATED ORAL at 11:31

## 2023-01-12 RX ADMIN — Medication 10: at 11:35

## 2023-01-12 RX ADMIN — Medication 10 MILLIGRAM(S): at 06:14

## 2023-01-12 RX ADMIN — VALSARTAN 40 MILLIGRAM(S): 80 TABLET ORAL at 06:15

## 2023-01-12 RX ADMIN — TAMSULOSIN HYDROCHLORIDE 0.4 MILLIGRAM(S): 0.4 CAPSULE ORAL at 21:28

## 2023-01-12 RX ADMIN — HEPARIN SODIUM 5000 UNIT(S): 5000 INJECTION INTRAVENOUS; SUBCUTANEOUS at 06:14

## 2023-01-12 RX ADMIN — Medication 100 MILLIGRAM(S): at 09:10

## 2023-01-12 RX ADMIN — Medication 1 MILLIGRAM(S): at 11:31

## 2023-01-12 RX ADMIN — INSULIN GLARGINE 16 UNIT(S): 100 INJECTION, SOLUTION SUBCUTANEOUS at 21:28

## 2023-01-12 RX ADMIN — HEPARIN SODIUM 5000 UNIT(S): 5000 INJECTION INTRAVENOUS; SUBCUTANEOUS at 13:29

## 2023-01-12 RX ADMIN — GABAPENTIN 300 MILLIGRAM(S): 400 CAPSULE ORAL at 21:27

## 2023-01-12 RX ADMIN — ATORVASTATIN CALCIUM 80 MILLIGRAM(S): 80 TABLET, FILM COATED ORAL at 21:27

## 2023-01-12 RX ADMIN — Medication 81 MILLIGRAM(S): at 11:31

## 2023-01-12 RX ADMIN — HEPARIN SODIUM 5000 UNIT(S): 5000 INJECTION INTRAVENOUS; SUBCUTANEOUS at 21:28

## 2023-01-12 RX ADMIN — CARVEDILOL PHOSPHATE 3.12 MILLIGRAM(S): 80 CAPSULE, EXTENDED RELEASE ORAL at 06:14

## 2023-01-12 RX ADMIN — SENNA PLUS 2 TABLET(S): 8.6 TABLET ORAL at 21:28

## 2023-01-12 RX ADMIN — ERYTHROPOIETIN 10000 UNIT(S): 10000 INJECTION, SOLUTION INTRAVENOUS; SUBCUTANEOUS at 16:14

## 2023-01-12 RX ADMIN — LIDOCAINE AND PRILOCAINE CREAM 1 APPLICATION(S): 25; 25 CREAM TOPICAL at 13:29

## 2023-01-12 RX ADMIN — CHLORHEXIDINE GLUCONATE 1 APPLICATION(S): 213 SOLUTION TOPICAL at 11:32

## 2023-01-12 NOTE — CHART NOTE - NSCHARTNOTEFT_GEN_A_CORE
IDT 1/12/23  SW: resides with spouse, daughter, and grandchildren.  wife will be away for 5 weeks in [facility]  Speech: improving arousal and attention. moderate cognitive deficits.  diet upgraded to Soft and Bite sized.   OT: Limited by symptomatic orthostasis.  Min A UBD; Min A toileting;  Min A--LBD and transfers  PT: bed mobility CG/CS; ambulated 70ft with RW and WC follow; plans for stair trial today  Goals: 1. Use urinal with Min A; 2. Communicate wants and needs independently; 3. Transfer OOB with 1 person assist  ELOS: 1/27/23 ELLA vs home?
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
## 2023-01-12 NOTE — CONSULT NOTE ADULT - ASSESSMENT
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 Pt exhibits difficulties with concentration, delayed recall of auditory verbal learning material (with loss of information over time and modest recovery with cueing), visuospatial skills, and aspects of language (i.e., repetition, fluency) and executive functions (including abstract reasoning, organizational skills, initiation and problem solving).Pt has poor self-awareness. His affect is depressed, and he reports a few adjustment symptoms in response to his current medical condition.  FIM scores: Social Interaction 5; Problem Solving 4; Memory 4.  Plan: Individual supportive psychotherapy to monitor cognition, affect/mood, and behavior. Cognitive remediation during speech therapy sessions is strongly recommended. Participation in recreation/art therapy in order to have pleasure and mastery experiences and regain/reestablish a sense of routine.  Time spent with Pt, 40 minutes.
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
## 2023-01-12 NOTE — PROGRESS NOTE ADULT - SUBJECTIVE AND OBJECTIVE BOX
Resting    Vital Signs Last 24 Hrs  T(C): 36.7 (01-12-23 @ 14:50), Max: 36.8 (01-11-23 @ 19:37)  T(F): 98 (01-12-23 @ 14:50), Max: 98.3 (01-11-23 @ 19:37)  HR: 93 (01-12-23 @ 18:42) (73 - 93)  BP: 103/64 (01-12-23 @ 18:42) (103/64 - 159/65)  RR: 16 (01-12-23 @ 14:50) (15 - 16)  SpO2: 100% (01-12-23 @ 14:50) (96% - 100%)    I&O's Detail    11 Jan 2023 07:01  -  12 Jan 2023 07:00  --------------------------------------------------------  OUT:    Voided (mL): 500 mL  Total OUT: 500 mL    s1s2  b/l air entry  soft, ND  no edema                                                                                 7.6    7.89  )-----------( 321      ( 12 Jan 2023 15:00 )             24.2     12 Jan 2023 15:00    133    |  96     |  76     ----------------------------<  264    4.1     |  25     |  4.61     Ca    7.9        12 Jan 2023 15:00  Phos  3.9       12 Jan 2023 15:00    TPro  6.2    /  Alb  2.4    /  TBili  0.2    /  DBili  x      /  AST  16     /  ALT  18     /  AlkPhos  122    12 Jan 2023 15:00    LIVER FUNCTIONS - ( 12 Jan 2023 15:00 )  Alb: 2.4 g/dL / Pro: 6.2 g/dL / ALK PHOS: 122 U/L / ALT: 18 U/L / AST: 16 U/L / GGT: x           acetaminophen     Tablet .. 650 milliGRAM(s) Oral every 6 hours PRN  allopurinol 100 milliGRAM(s) Oral <User Schedule>  aspirin  chewable 81 milliGRAM(s) Oral daily  atorvastatin 80 milliGRAM(s) Oral at bedtime  carvedilol 3.125 milliGRAM(s) Oral every 12 hours  chlorhexidine 2% Cloths 1 Application(s) Topical daily  clopidogrel Tablet 75 milliGRAM(s) Oral daily  dextrose 5%. 1000 milliLiter(s) IV Continuous <Continuous>  dextrose 5%. 1000 milliLiter(s) IV Continuous <Continuous>  dextrose 50% Injectable 25 Gram(s) IV Push once  dextrose 50% Injectable 12.5 Gram(s) IV Push once  dextrose 50% Injectable 25 Gram(s) IV Push once  dextrose Oral Gel 15 Gram(s) Oral once PRN  epoetin wayne-epbx (RETACRIT) Injectable 70431 Unit(s) IV Push <User Schedule>  famotidine    Tablet 10 milliGRAM(s) Oral daily  folic acid 1 milliGRAM(s) Oral daily  gabapentin 300 milliGRAM(s) Oral at bedtime  glucagon  Injectable 1 milliGRAM(s) IntraMuscular once  heparin   Injectable 5000 Unit(s) SubCutaneous every 8 hours  insulin glargine Injectable (LANTUS) 16 Unit(s) SubCutaneous at bedtime  insulin lispro (ADMELOG) corrective regimen sliding scale   SubCutaneous three times a day with meals  insulin lispro (ADMELOG) corrective regimen sliding scale   SubCutaneous at bedtime  lidocaine/prilocaine Cream 1 Application(s) Topical <User Schedule> PRN  melatonin 3 milliGRAM(s) Oral at bedtime PRN  polyethylene glycol 3350 17 Gram(s) Oral two times a day  senna 2 Tablet(s) Oral at bedtime  tamsulosin 0.4 milliGRAM(s) Oral at bedtime  valsartan 40 milliGRAM(s) Oral daily  zinc oxide 40% Paste 1 Application(s) Topical daily    A/P:    DM, HTN, CM  S/p SDH  ESRD on HD TTS  Fluid removal w/HD as able  Epoetin w/HD  Rehab    [phone]

## 2023-01-13 LAB
GLUCOSE BLDC GLUCOMTR-MCNC: 131 MG/DL — HIGH (ref 70–99)
GLUCOSE BLDC GLUCOMTR-MCNC: 176 MG/DL — HIGH (ref 70–99)
GLUCOSE BLDC GLUCOMTR-MCNC: 178 MG/DL — HIGH (ref 70–99)
GLUCOSE BLDC GLUCOMTR-MCNC: 317 MG/DL — HIGH (ref 70–99)

## 2023-01-13 PROCEDURE — 99232 SBSQ HOSP IP/OBS MODERATE 35: CPT

## 2023-01-13 PROCEDURE — 70450 CT HEAD/BRAIN W/O DYE: CPT | Mod: 26

## 2023-01-13 PROCEDURE — 74176 CT ABD & PELVIS W/O CONTRAST: CPT | Mod: 26

## 2023-01-13 RX ADMIN — CHLORHEXIDINE GLUCONATE 1 APPLICATION(S): 213 SOLUTION TOPICAL at 12:55

## 2023-01-13 RX ADMIN — HEPARIN SODIUM 5000 UNIT(S): 5000 INJECTION INTRAVENOUS; SUBCUTANEOUS at 21:25

## 2023-01-13 RX ADMIN — HEPARIN SODIUM 5000 UNIT(S): 5000 INJECTION INTRAVENOUS; SUBCUTANEOUS at 13:46

## 2023-01-13 RX ADMIN — Medication 10: at 12:54

## 2023-01-13 RX ADMIN — INSULIN GLARGINE 16 UNIT(S): 100 INJECTION, SOLUTION SUBCUTANEOUS at 21:28

## 2023-01-13 RX ADMIN — GABAPENTIN 300 MILLIGRAM(S): 400 CAPSULE ORAL at 21:25

## 2023-01-13 RX ADMIN — CLOPIDOGREL BISULFATE 75 MILLIGRAM(S): 75 TABLET, FILM COATED ORAL at 12:53

## 2023-01-13 RX ADMIN — Medication 100 MILLIGRAM(S): at 09:08

## 2023-01-13 RX ADMIN — Medication 1 MILLIGRAM(S): at 12:54

## 2023-01-13 RX ADMIN — Medication 81 MILLIGRAM(S): at 12:53

## 2023-01-13 RX ADMIN — TAMSULOSIN HYDROCHLORIDE 0.4 MILLIGRAM(S): 0.4 CAPSULE ORAL at 21:25

## 2023-01-13 RX ADMIN — CARVEDILOL PHOSPHATE 3.12 MILLIGRAM(S): 80 CAPSULE, EXTENDED RELEASE ORAL at 17:54

## 2023-01-13 RX ADMIN — ATORVASTATIN CALCIUM 80 MILLIGRAM(S): 80 TABLET, FILM COATED ORAL at 21:25

## 2023-01-13 RX ADMIN — CARVEDILOL PHOSPHATE 3.12 MILLIGRAM(S): 80 CAPSULE, EXTENDED RELEASE ORAL at 06:00

## 2023-01-13 RX ADMIN — FAMOTIDINE 10 MILLIGRAM(S): 10 INJECTION INTRAVENOUS at 12:54

## 2023-01-13 RX ADMIN — Medication 4: at 16:33

## 2023-01-13 RX ADMIN — ZINC OXIDE 1 APPLICATION(S): 200 OINTMENT TOPICAL at 12:53

## 2023-01-13 RX ADMIN — HEPARIN SODIUM 5000 UNIT(S): 5000 INJECTION INTRAVENOUS; SUBCUTANEOUS at 05:57

## 2023-01-13 NOTE — PROGRESS NOTE ADULT - SUBJECTIVE AND OBJECTIVE BOX
Patient is a 62y old  Male who presents with a chief complaint of Traumatic SDH     Patient denies chest pain, SOB, N/V, abd pain, tolerating diet  Reports headache improved    Patient seen and examined at bedside.    ALLERGIES:  No Known Drug Allergies  shellfish (Unknown)    MEDICATIONS  (STANDING):  allopurinol 100 milliGRAM(s) Oral <User Schedule>  aspirin  chewable 81 milliGRAM(s) Oral daily  atorvastatin 80 milliGRAM(s) Oral at bedtime  carvedilol 3.125 milliGRAM(s) Oral every 12 hours  chlorhexidine 2% Cloths 1 Application(s) Topical daily  clopidogrel Tablet 75 milliGRAM(s) Oral daily  dextrose 5%. 1000 milliLiter(s) (50 mL/Hr) IV Continuous <Continuous>  dextrose 5%. 1000 milliLiter(s) (100 mL/Hr) IV Continuous <Continuous>  dextrose 50% Injectable 25 Gram(s) IV Push once  dextrose 50% Injectable 12.5 Gram(s) IV Push once  dextrose 50% Injectable 25 Gram(s) IV Push once  epoetin wayne-epbx (RETACRIT) Injectable 35871 Unit(s) IV Push <User Schedule>  famotidine    Tablet 10 milliGRAM(s) Oral daily  folic acid 1 milliGRAM(s) Oral daily  gabapentin 300 milliGRAM(s) Oral at bedtime  glucagon  Injectable 1 milliGRAM(s) IntraMuscular once  heparin   Injectable 5000 Unit(s) SubCutaneous every 8 hours  insulin glargine Injectable (LANTUS) 16 Unit(s) SubCutaneous at bedtime  insulin lispro (ADMELOG) corrective regimen sliding scale   SubCutaneous three times a day with meals  insulin lispro (ADMELOG) corrective regimen sliding scale   SubCutaneous at bedtime  polyethylene glycol 3350 17 Gram(s) Oral two times a day  predniSONE   Tablet   Oral   senna 2 Tablet(s) Oral at bedtime  tamsulosin 0.4 milliGRAM(s) Oral at bedtime  valsartan 40 milliGRAM(s) Oral daily  zinc oxide 40% Paste 1 Application(s) Topical daily    MEDICATIONS  (PRN):  acetaminophen     Tablet .. 650 milliGRAM(s) Oral every 6 hours PRN Temp greater or equal to 38C (100.4F), Mild Pain (1 - 3)  dextrose Oral Gel 15 Gram(s) Oral once PRN Blood Glucose LESS THAN 70 milliGRAM(s)/deciliter  lidocaine/prilocaine Cream 1 Application(s) Topical <User Schedule> PRN on dialysis days  melatonin 3 milliGRAM(s) Oral at bedtime PRN Insomnia    Vital Signs Last 24 Hrs  T(C): 36.7 (13 Jan 2023 07:49), Max: 36.7 (12 Jan 2023 14:50)  T(F): 98 (13 Jan 2023 07:49), Max: 98 (12 Jan 2023 14:50)  HR: 82 (13 Jan 2023 07:49) (80 - 93)  BP: 116/68 (13 Jan 2023 07:49) (103/64 - 142/55)  RR: 16 (13 Jan 2023 07:49) (15 - 16)  SpO2: 98% (13 Jan 2023 07:49) (98% - 100%)    Parameters below as of 13 Jan 2023 07:49  Patient On (Oxygen Delivery Method): room air    PHYSICAL EXAM:  General: NAD, A/O x 3  ENT: MMM, no scleral icterus  Neck: Supple, No JVD, no thyroidomegaly  Lungs: Clear to auscultation bilaterally, no wheezes, no rales, no rhonchi, good inspiratory effort  Cardio: RRR, S1/S2, No murmurs  Abdomen: Soft, Nontender, Nondistended; Bowel sounds present  Extremities: No calf tenderness, No pitting edema, no skin changes    LABS:                        7.6    7.89  )-----------( 321      ( 12 Jan 2023 15:00 )             24.2     01-10    131  |  93  |  83  ----------------------------<  242  4.6   |  24  |  5.59    Ca    8.7      10 Adrian 2023 14:44  Phos  4.8     01-10    POCT Blood Glucose.: 99 mg/dL (11 Jan 2023 08:15)  POCT Blood Glucose.: 199 mg/dL (10 Adrian 2023 21:23)  POCT Blood Glucose.: 170 mg/dL (10 Adrian 2023 18:21)  POCT Blood Glucose.: 298 mg/dL (10 Adrian 2023 12:00)    COVID-19 PCR: NotDetec (12-30-22 @ 09:48)  COVID-19 PCR: NotDetec (12-25-22 @ 23:02)  COVID-19 PCR: Detected (12-25-22 @ 10:37)  COVID-19 PCR: NotDetec (12-21-22 @ 22:30)  COVID-19 PCR: Detected (12-21-22 @ 15:17)  COVID-19 PCR: NotDetec (12-30-22 @ 09:48)  COVID-19 PCR: NotDetec (12-25-22 @ 23:02)  COVID-19 PCR: Detected (12-25-22 @ 10:37)  COVID-19 PCR: NotDetec (12-21-22 @ 22:30)  COVID-19 PCR: Detected (12-21-22 @ 15:17)  COVID-19 PCR: NotDetec (12-12-22 @ 19:18)  COVID-19 PCR: Detected (12-12-22 @ 17:22)  COVID-19 PCR: NotDetec (11-06-22 @ 08:08)  COVID-19 PCR: NotDetec (11-02-22 @ 07:23)  COVID-19 PCR: NotDetec (10-27-22 @ 09:26)
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
## 2023-01-13 NOTE — PROGRESS NOTE ADULT - SUBJECTIVE AND OBJECTIVE BOX
Resting    Vital Signs Last 24 Hrs  T(C): 36.6 (01-13-23 @ 20:28), Max: 36.7 (01-13-23 @ 07:49)  T(F): 97.8 (01-13-23 @ 20:28), Max: 98 (01-13-23 @ 07:49)  HR: 81 (01-13-23 @ 20:28) (80 - 82)  BP: 157/78 (01-13-23 @ 20:28) (116/68 - 157/78)  RR: 15 (01-13-23 @ 20:28) (15 - 16)  SpO2: 98% (01-13-23 @ 20:28) (98% - 98%)    I&O's Detail    12 Jan 2023 07:01  -  13 Jan 2023 07:00  --------------------------------------------------------  OUT:    Other (mL): 2000 mL  Total OUT: 2000 mL    13 Jan 2023 07:01  -  13 Jan 2023 21:32  --------------------------------------------------------  OUT:    Voided (mL): 200 mL  Total OUT: 200 mL    s1s2  b/l air entry  soft, ND  no edema                                                                                         7.6    7.89  )-----------( 321      ( 12 Jan 2023 15:00 )             24.2     12 Jan 2023 15:00    133    |  96     |  76     ----------------------------<  264    4.1     |  25     |  4.61     Ca    7.9        12 Jan 2023 15:00  Phos  3.9       12 Jan 2023 15:00    TPro  6.2    /  Alb  2.4    /  TBili  0.2    /  DBili  x      /  AST  16     /  ALT  18     /  AlkPhos  122    12 Jan 2023 15:00    LIVER FUNCTIONS - ( 12 Jan 2023 15:00 )  Alb: 2.4 g/dL / Pro: 6.2 g/dL / ALK PHOS: 122 U/L / ALT: 18 U/L / AST: 16 U/L / GGT: x           acetaminophen     Tablet .. 650 milliGRAM(s) Oral every 6 hours PRN  allopurinol 100 milliGRAM(s) Oral <User Schedule>  aspirin  chewable 81 milliGRAM(s) Oral daily  atorvastatin 80 milliGRAM(s) Oral at bedtime  carvedilol 3.125 milliGRAM(s) Oral every 12 hours  chlorhexidine 2% Cloths 1 Application(s) Topical daily  clopidogrel Tablet 75 milliGRAM(s) Oral daily  dextrose 5%. 1000 milliLiter(s) IV Continuous <Continuous>  dextrose 5%. 1000 milliLiter(s) IV Continuous <Continuous>  dextrose 50% Injectable 25 Gram(s) IV Push once  dextrose 50% Injectable 12.5 Gram(s) IV Push once  dextrose 50% Injectable 25 Gram(s) IV Push once  dextrose Oral Gel 15 Gram(s) Oral once PRN  epoetin wayne-epbx (RETACRIT) Injectable 81586 Unit(s) IV Push <User Schedule>  famotidine    Tablet 10 milliGRAM(s) Oral daily  folic acid 1 milliGRAM(s) Oral daily  gabapentin 300 milliGRAM(s) Oral at bedtime  glucagon  Injectable 1 milliGRAM(s) IntraMuscular once  heparin   Injectable 5000 Unit(s) SubCutaneous every 8 hours  insulin glargine Injectable (LANTUS) 16 Unit(s) SubCutaneous at bedtime  insulin lispro (ADMELOG) corrective regimen sliding scale   SubCutaneous three times a day with meals  insulin lispro (ADMELOG) corrective regimen sliding scale   SubCutaneous at bedtime  lidocaine/prilocaine Cream 1 Application(s) Topical <User Schedule> PRN  melatonin 3 milliGRAM(s) Oral at bedtime PRN  polyethylene glycol 3350 17 Gram(s) Oral two times a day  senna 2 Tablet(s) Oral at bedtime  tamsulosin 0.4 milliGRAM(s) Oral at bedtime  zinc oxide 40% Paste 1 Application(s) Topical daily    A/P:    DM, HTN, CM  S/p SDH  ESRD on HD TTS  Fluid removal w/HD as able  Epoetin w/HD  Rehab    [phone]
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
## 2023-01-14 LAB
GLUCOSE BLDC GLUCOMTR-MCNC: 149 MG/DL — HIGH (ref 70–99)
GLUCOSE BLDC GLUCOMTR-MCNC: 155 MG/DL — HIGH (ref 70–99)
GLUCOSE BLDC GLUCOMTR-MCNC: 156 MG/DL — HIGH (ref 70–99)
GLUCOSE BLDC GLUCOMTR-MCNC: 201 MG/DL — HIGH (ref 70–99)
HCT VFR BLD CALC: 29.6 % — LOW (ref 39–50)
HGB BLD-MCNC: 9.4 G/DL — LOW (ref 13–17)
MCHC RBC-ENTMCNC: 27 PG — SIGNIFICANT CHANGE UP (ref 27–34)
MCHC RBC-ENTMCNC: 31.8 GM/DL — LOW (ref 32–36)
MCV RBC AUTO: 85.1 FL — SIGNIFICANT CHANGE UP (ref 80–100)
NRBC # BLD: 0 /100 WBCS — SIGNIFICANT CHANGE UP (ref 0–0)
PLATELET # BLD AUTO: 359 K/UL — SIGNIFICANT CHANGE UP (ref 150–400)
RBC # BLD: 3.48 M/UL — LOW (ref 4.2–5.8)
RBC # FLD: 15.9 % — HIGH (ref 10.3–14.5)
WBC # BLD: 6.57 K/UL — SIGNIFICANT CHANGE UP (ref 3.8–10.5)
WBC # FLD AUTO: 6.57 K/UL — SIGNIFICANT CHANGE UP (ref 3.8–10.5)

## 2023-01-14 PROCEDURE — 99232 SBSQ HOSP IP/OBS MODERATE 35: CPT

## 2023-01-14 RX ADMIN — ZINC OXIDE 1 APPLICATION(S): 200 OINTMENT TOPICAL at 12:25

## 2023-01-14 RX ADMIN — HEPARIN SODIUM 5000 UNIT(S): 5000 INJECTION INTRAVENOUS; SUBCUTANEOUS at 06:34

## 2023-01-14 RX ADMIN — LIDOCAINE AND PRILOCAINE CREAM 1 APPLICATION(S): 25; 25 CREAM TOPICAL at 12:27

## 2023-01-14 RX ADMIN — INSULIN GLARGINE 16 UNIT(S): 100 INJECTION, SOLUTION SUBCUTANEOUS at 21:26

## 2023-01-14 RX ADMIN — Medication 650 MILLIGRAM(S): at 20:25

## 2023-01-14 RX ADMIN — Medication 4: at 08:45

## 2023-01-14 RX ADMIN — HEPARIN SODIUM 5000 UNIT(S): 5000 INJECTION INTRAVENOUS; SUBCUTANEOUS at 21:24

## 2023-01-14 RX ADMIN — ATORVASTATIN CALCIUM 80 MILLIGRAM(S): 80 TABLET, FILM COATED ORAL at 21:24

## 2023-01-14 RX ADMIN — Medication 650 MILLIGRAM(S): at 04:45

## 2023-01-14 RX ADMIN — TAMSULOSIN HYDROCHLORIDE 0.4 MILLIGRAM(S): 0.4 CAPSULE ORAL at 21:23

## 2023-01-14 RX ADMIN — CLOPIDOGREL BISULFATE 75 MILLIGRAM(S): 75 TABLET, FILM COATED ORAL at 12:24

## 2023-01-14 RX ADMIN — FAMOTIDINE 10 MILLIGRAM(S): 10 INJECTION INTRAVENOUS at 12:24

## 2023-01-14 RX ADMIN — Medication 100 MILLIGRAM(S): at 08:47

## 2023-01-14 RX ADMIN — Medication 81 MILLIGRAM(S): at 12:25

## 2023-01-14 RX ADMIN — CHLORHEXIDINE GLUCONATE 1 APPLICATION(S): 213 SOLUTION TOPICAL at 12:24

## 2023-01-14 RX ADMIN — HEPARIN SODIUM 5000 UNIT(S): 5000 INJECTION INTRAVENOUS; SUBCUTANEOUS at 13:39

## 2023-01-14 RX ADMIN — Medication 650 MILLIGRAM(S): at 19:25

## 2023-01-14 RX ADMIN — GABAPENTIN 300 MILLIGRAM(S): 400 CAPSULE ORAL at 21:23

## 2023-01-14 RX ADMIN — CARVEDILOL PHOSPHATE 3.12 MILLIGRAM(S): 80 CAPSULE, EXTENDED RELEASE ORAL at 06:34

## 2023-01-14 RX ADMIN — Medication 1 MILLIGRAM(S): at 12:25

## 2023-01-14 RX ADMIN — Medication 650 MILLIGRAM(S): at 03:43

## 2023-01-14 RX ADMIN — Medication 4: at 18:15
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
## 2023-01-14 NOTE — PROGRESS NOTE ADULT - SUBJECTIVE AND OBJECTIVE BOX
Stable on HD    Vital Signs Last 24 Hrs  T(C): 36.4 (01-14-23 @ 17:38), Max: 36.7 (01-14-23 @ 14:38)  T(F): 97.6 (01-14-23 @ 17:38), Max: 98.1 (01-14-23 @ 14:38)  HR: 95 (01-14-23 @ 18:28) (81 - 95)  BP: 96/63 (01-14-23 @ 18:28) (96/63 - 166/70)  RR: 16 (01-14-23 @ 17:38) (14 - 16)  SpO2: 99% (01-14-23 @ 17:38) (98% - 100%)    I&O's Detail    14 Jan 2023 07:01  -  14 Jan 2023 19:21  --------------------------------------------------------  IN:    Other (mL): 800 mL  Total IN: 800 mL    OUT:    Other (mL): 2800 mL    Voided (mL): 400 mL  Total OUT: 3200 mL    Total NET: -2400 mL    s1s2  b/l air entry  soft, ND  no edema                                                                                                 9.4    6.57  )-----------( 359      ( 14 Jan 2023 17:42 )             29.6     acetaminophen     Tablet .. 650 milliGRAM(s) Oral every 6 hours PRN  allopurinol 100 milliGRAM(s) Oral <User Schedule>  aspirin  chewable 81 milliGRAM(s) Oral daily  atorvastatin 80 milliGRAM(s) Oral at bedtime  carvedilol 3.125 milliGRAM(s) Oral every 12 hours  chlorhexidine 2% Cloths 1 Application(s) Topical daily  clopidogrel Tablet 75 milliGRAM(s) Oral daily  dextrose 5%. 1000 milliLiter(s) IV Continuous <Continuous>  dextrose 5%. 1000 milliLiter(s) IV Continuous <Continuous>  dextrose 50% Injectable 25 Gram(s) IV Push once  dextrose 50% Injectable 12.5 Gram(s) IV Push once  dextrose 50% Injectable 25 Gram(s) IV Push once  dextrose Oral Gel 15 Gram(s) Oral once PRN  epoetin wayne-epbx (RETACRIT) Injectable 79298 Unit(s) IV Push <User Schedule>  famotidine    Tablet 10 milliGRAM(s) Oral daily  folic acid 1 milliGRAM(s) Oral daily  gabapentin 300 milliGRAM(s) Oral at bedtime  glucagon  Injectable 1 milliGRAM(s) IntraMuscular once  heparin   Injectable 5000 Unit(s) SubCutaneous every 8 hours  insulin glargine Injectable (LANTUS) 16 Unit(s) SubCutaneous at bedtime  insulin lispro (ADMELOG) corrective regimen sliding scale   SubCutaneous three times a day with meals  insulin lispro (ADMELOG) corrective regimen sliding scale   SubCutaneous at bedtime  lidocaine/prilocaine Cream 1 Application(s) Topical <User Schedule> PRN  melatonin 3 milliGRAM(s) Oral at bedtime PRN  polyethylene glycol 3350 17 Gram(s) Oral two times a day  senna 2 Tablet(s) Oral at bedtime  tamsulosin 0.4 milliGRAM(s) Oral at bedtime  zinc oxide 40% Paste 1 Application(s) Topical daily    A/P:    DM, HTN, CM  S/p SDH  ESRD on HD TTS  Fluid removal w/HD 2kg today  Epoetin w/HD  Rehab    [phone]
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
## 2023-01-14 NOTE — PROGRESS NOTE ADULT - SUBJECTIVE AND OBJECTIVE BOX
Cc: Impaired mobility     HPI: Patient with no new medical issues today.  Resting comfortably.    Has been tolerating rehabilitation program.    acetaminophen     Tablet .. 650 milliGRAM(s) Oral every 6 hours PRN  allopurinol 100 milliGRAM(s) Oral <User Schedule>  aspirin  chewable 81 milliGRAM(s) Oral daily  atorvastatin 80 milliGRAM(s) Oral at bedtime  carvedilol 3.125 milliGRAM(s) Oral every 12 hours  chlorhexidine 2% Cloths 1 Application(s) Topical daily  clopidogrel Tablet 75 milliGRAM(s) Oral daily  dextrose 5%. 1000 milliLiter(s) IV Continuous <Continuous>  dextrose 5%. 1000 milliLiter(s) IV Continuous <Continuous>  dextrose 50% Injectable 25 Gram(s) IV Push once  dextrose 50% Injectable 12.5 Gram(s) IV Push once  dextrose 50% Injectable 25 Gram(s) IV Push once  dextrose Oral Gel 15 Gram(s) Oral once PRN  epoetin wayne-epbx (RETACRIT) Injectable 21120 Unit(s) IV Push <User Schedule>  famotidine    Tablet 10 milliGRAM(s) Oral daily  folic acid 1 milliGRAM(s) Oral daily  gabapentin 300 milliGRAM(s) Oral at bedtime  glucagon  Injectable 1 milliGRAM(s) IntraMuscular once  heparin   Injectable 5000 Unit(s) SubCutaneous every 8 hours  insulin glargine Injectable (LANTUS) 16 Unit(s) SubCutaneous at bedtime  insulin lispro (ADMELOG) corrective regimen sliding scale   SubCutaneous three times a day with meals  insulin lispro (ADMELOG) corrective regimen sliding scale   SubCutaneous at bedtime  lidocaine/prilocaine Cream 1 Application(s) Topical <User Schedule> PRN  melatonin 3 milliGRAM(s) Oral at bedtime PRN  polyethylene glycol 3350 17 Gram(s) Oral two times a day  senna 2 Tablet(s) Oral at bedtime  tamsulosin 0.4 milliGRAM(s) Oral at bedtime  zinc oxide 40% Paste 1 Application(s) Topical daily      T(C): 36.5 (01-14-23 @ 08:13), Max: 36.6 (01-13-23 @ 20:28)  HR: 82 (01-14-23 @ 08:13) (80 - 88)  BP: 120/61 (01-14-23 @ 08:13) (120/61 - 157/78)  RR: 16 (01-14-23 @ 08:13) (15 - 16)  SpO2: 98% (01-13-23 @ 20:28) (98% - 98%)    In NAD  HEENT- EOMI  Heart- No cyanosis   Lungs- No respiratory distress   Abd- + BS, NT  Ext- No calf pain  Neuro- Exam unchanged                          7.6    7.89  )-----------( 321      ( 12 Jan 2023 15:00 )             24.2     01-12    133<L>  |  96  |  76<H>  ----------------------------<  264<H>  4.1   |  25  |  4.61<H>    Ca    7.9<L>      12 Jan 2023 15:00  Phos  3.9     01-12    TPro  6.2  /  Alb  2.4<L>  /  TBili  0.2  /  DBili  x   /  AST  16  /  ALT  18  /  AlkPhos  122<H>  01-12        Imp: Patient with diagnosis of SDH admitted for comprehensive acute rehabilitation.    Plan:  - Continue PT/OT/SLP  - DVT prophylaxis - Heparin  - Skin- Turn q2h, check skin daily  - Continue current medications; patient medically stable.   -Active issues-   -CAD - Plavix, aspirin  -Anemia -  Monitor hemoglobin, nephrology following  -HTN - carvedilol   - Patient is stable to continue current rehabilitation program.

## 2023-01-15 LAB
GLUCOSE BLDC GLUCOMTR-MCNC: 125 MG/DL — HIGH (ref 70–99)
GLUCOSE BLDC GLUCOMTR-MCNC: 217 MG/DL — HIGH (ref 70–99)
GLUCOSE BLDC GLUCOMTR-MCNC: 228 MG/DL — HIGH (ref 70–99)
GLUCOSE BLDC GLUCOMTR-MCNC: 275 MG/DL — HIGH (ref 70–99)
HBV SURFACE AB SER-ACNC: <3 MIU/ML — LOW
HBV SURFACE AG SER-ACNC: SIGNIFICANT CHANGE UP
HCV AB S/CO SERPL IA: 0.1 S/CO — SIGNIFICANT CHANGE UP (ref 0–0.99)
HCV AB SERPL-IMP: SIGNIFICANT CHANGE UP

## 2023-01-15 PROCEDURE — 99232 SBSQ HOSP IP/OBS MODERATE 35: CPT

## 2023-01-15 RX ADMIN — Medication 81 MILLIGRAM(S): at 11:28

## 2023-01-15 RX ADMIN — ATORVASTATIN CALCIUM 80 MILLIGRAM(S): 80 TABLET, FILM COATED ORAL at 21:25

## 2023-01-15 RX ADMIN — Medication 8: at 11:58

## 2023-01-15 RX ADMIN — GABAPENTIN 300 MILLIGRAM(S): 400 CAPSULE ORAL at 21:25

## 2023-01-15 RX ADMIN — HEPARIN SODIUM 5000 UNIT(S): 5000 INJECTION INTRAVENOUS; SUBCUTANEOUS at 13:22

## 2023-01-15 RX ADMIN — HEPARIN SODIUM 5000 UNIT(S): 5000 INJECTION INTRAVENOUS; SUBCUTANEOUS at 06:26

## 2023-01-15 RX ADMIN — Medication 6: at 16:57

## 2023-01-15 RX ADMIN — CHLORHEXIDINE GLUCONATE 1 APPLICATION(S): 213 SOLUTION TOPICAL at 11:30

## 2023-01-15 RX ADMIN — Medication 650 MILLIGRAM(S): at 18:18

## 2023-01-15 RX ADMIN — CARVEDILOL PHOSPHATE 3.12 MILLIGRAM(S): 80 CAPSULE, EXTENDED RELEASE ORAL at 18:18

## 2023-01-15 RX ADMIN — TAMSULOSIN HYDROCHLORIDE 0.4 MILLIGRAM(S): 0.4 CAPSULE ORAL at 21:25

## 2023-01-15 RX ADMIN — SENNA PLUS 2 TABLET(S): 8.6 TABLET ORAL at 21:26

## 2023-01-15 RX ADMIN — FAMOTIDINE 10 MILLIGRAM(S): 10 INJECTION INTRAVENOUS at 11:28

## 2023-01-15 RX ADMIN — INSULIN GLARGINE 16 UNIT(S): 100 INJECTION, SOLUTION SUBCUTANEOUS at 21:26

## 2023-01-15 RX ADMIN — CARVEDILOL PHOSPHATE 3.12 MILLIGRAM(S): 80 CAPSULE, EXTENDED RELEASE ORAL at 06:27

## 2023-01-15 RX ADMIN — HEPARIN SODIUM 5000 UNIT(S): 5000 INJECTION INTRAVENOUS; SUBCUTANEOUS at 21:26

## 2023-01-15 RX ADMIN — Medication 650 MILLIGRAM(S): at 19:15

## 2023-01-15 RX ADMIN — Medication 100 MILLIGRAM(S): at 08:46

## 2023-01-15 RX ADMIN — Medication 1 MILLIGRAM(S): at 11:28

## 2023-01-15 RX ADMIN — CLOPIDOGREL BISULFATE 75 MILLIGRAM(S): 75 TABLET, FILM COATED ORAL at 11:28
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
## 2023-01-15 NOTE — PROGRESS NOTE ADULT - SUBJECTIVE AND OBJECTIVE BOX
Cc: Gait dysfunction    HPI: Patient with no new medical issues today.  Pain controlled, no chest pain, no N/V, no Fevers/Chills. No other new ROS  Has been tolerating rehabilitation program.    acetaminophen     Tablet .. 650 milliGRAM(s) Oral every 6 hours PRN  allopurinol 100 milliGRAM(s) Oral <User Schedule>  aspirin  chewable 81 milliGRAM(s) Oral daily  atorvastatin 80 milliGRAM(s) Oral at bedtime  carvedilol 3.125 milliGRAM(s) Oral every 12 hours  chlorhexidine 2% Cloths 1 Application(s) Topical daily  clopidogrel Tablet 75 milliGRAM(s) Oral daily  dextrose 5%. 1000 milliLiter(s) IV Continuous <Continuous>  dextrose 5%. 1000 milliLiter(s) IV Continuous <Continuous>  dextrose 50% Injectable 25 Gram(s) IV Push once  dextrose 50% Injectable 12.5 Gram(s) IV Push once  dextrose 50% Injectable 25 Gram(s) IV Push once  dextrose Oral Gel 15 Gram(s) Oral once PRN  epoetin wayne-epbx (RETACRIT) Injectable 92308 Unit(s) IV Push <User Schedule>  famotidine    Tablet 10 milliGRAM(s) Oral daily  folic acid 1 milliGRAM(s) Oral daily  gabapentin 300 milliGRAM(s) Oral at bedtime  glucagon  Injectable 1 milliGRAM(s) IntraMuscular once  heparin   Injectable 5000 Unit(s) SubCutaneous every 8 hours  insulin glargine Injectable (LANTUS) 16 Unit(s) SubCutaneous at bedtime  insulin lispro (ADMELOG) corrective regimen sliding scale   SubCutaneous three times a day with meals  insulin lispro (ADMELOG) corrective regimen sliding scale   SubCutaneous at bedtime  lidocaine/prilocaine Cream 1 Application(s) Topical <User Schedule> PRN  melatonin 3 milliGRAM(s) Oral at bedtime PRN  polyethylene glycol 3350 17 Gram(s) Oral two times a day  senna 2 Tablet(s) Oral at bedtime  tamsulosin 0.4 milliGRAM(s) Oral at bedtime  zinc oxide 40% Paste 1 Application(s) Topical daily      T(C): 36.8 (01-15-23 @ 07:45), Max: 36.8 (01-15-23 @ 07:45)  HR: 74 (01-15-23 @ 07:45) (74 - 95)  BP: 153/80 (01-15-23 @ 07:45) (96/63 - 166/70)  RR: 12 (01-15-23 @ 07:45) (12 - 16)  SpO2: 100% (01-15-23 @ 07:45) (97% - 100%)    In NAD  HEENT- EOMI  Heart- No cyanosis  Lungs- No respiratory distress   Abd- + BS, NT  Ext- No calf pain  Neuro- Exam unchanged                          9.4    6.57  )-----------( 359      ( 14 Jan 2023 17:42 )             29.6           Imp: Patient with diagnosis of SDH admitted for comprehensive acute rehabilitation.    Plan:  - Continue PT/OT/SLP  - DVT prophylaxis - Heparin  - Skin- Turn q2h, check skin daily  - Continue current medications; patient medically stable.   -Active issues-   -CAD - Plavix, aspirin  -Anemia -  hemoglobin improved, continue to monitor, nephrology following  -HTN - carvedilol   -CKD -  HD   - Patient is stable to continue current rehabilitation program.

## 2023-01-16 LAB
GLUCOSE BLDC GLUCOMTR-MCNC: 168 MG/DL — HIGH (ref 70–99)
GLUCOSE BLDC GLUCOMTR-MCNC: 188 MG/DL — HIGH (ref 70–99)
GLUCOSE BLDC GLUCOMTR-MCNC: 194 MG/DL — HIGH (ref 70–99)
GLUCOSE BLDC GLUCOMTR-MCNC: 205 MG/DL — HIGH (ref 70–99)

## 2023-01-16 PROCEDURE — 99232 SBSQ HOSP IP/OBS MODERATE 35: CPT

## 2023-01-16 RX ADMIN — GABAPENTIN 300 MILLIGRAM(S): 400 CAPSULE ORAL at 21:27

## 2023-01-16 RX ADMIN — HEPARIN SODIUM 5000 UNIT(S): 5000 INJECTION INTRAVENOUS; SUBCUTANEOUS at 06:44

## 2023-01-16 RX ADMIN — POLYETHYLENE GLYCOL 3350 17 GRAM(S): 17 POWDER, FOR SOLUTION ORAL at 17:40

## 2023-01-16 RX ADMIN — Medication 4: at 16:50

## 2023-01-16 RX ADMIN — INSULIN GLARGINE 16 UNIT(S): 100 INJECTION, SOLUTION SUBCUTANEOUS at 21:28

## 2023-01-16 RX ADMIN — HEPARIN SODIUM 5000 UNIT(S): 5000 INJECTION INTRAVENOUS; SUBCUTANEOUS at 21:27

## 2023-01-16 RX ADMIN — CARVEDILOL PHOSPHATE 3.12 MILLIGRAM(S): 80 CAPSULE, EXTENDED RELEASE ORAL at 06:44

## 2023-01-16 RX ADMIN — Medication 81 MILLIGRAM(S): at 11:26

## 2023-01-16 RX ADMIN — ZINC OXIDE 1 APPLICATION(S): 200 OINTMENT TOPICAL at 11:27

## 2023-01-16 RX ADMIN — Medication 1 MILLIGRAM(S): at 11:27

## 2023-01-16 RX ADMIN — Medication 4: at 08:00

## 2023-01-16 RX ADMIN — SENNA PLUS 2 TABLET(S): 8.6 TABLET ORAL at 21:27

## 2023-01-16 RX ADMIN — Medication 650 MILLIGRAM(S): at 19:43

## 2023-01-16 RX ADMIN — Medication 4: at 11:34

## 2023-01-16 RX ADMIN — Medication 100 MILLIGRAM(S): at 08:12

## 2023-01-16 RX ADMIN — ATORVASTATIN CALCIUM 80 MILLIGRAM(S): 80 TABLET, FILM COATED ORAL at 21:27

## 2023-01-16 RX ADMIN — CHLORHEXIDINE GLUCONATE 1 APPLICATION(S): 213 SOLUTION TOPICAL at 11:29

## 2023-01-16 RX ADMIN — CLOPIDOGREL BISULFATE 75 MILLIGRAM(S): 75 TABLET, FILM COATED ORAL at 11:26

## 2023-01-16 RX ADMIN — TAMSULOSIN HYDROCHLORIDE 0.4 MILLIGRAM(S): 0.4 CAPSULE ORAL at 21:27

## 2023-01-16 RX ADMIN — CARVEDILOL PHOSPHATE 3.12 MILLIGRAM(S): 80 CAPSULE, EXTENDED RELEASE ORAL at 17:40

## 2023-01-16 RX ADMIN — FAMOTIDINE 10 MILLIGRAM(S): 10 INJECTION INTRAVENOUS at 11:27

## 2023-01-16 RX ADMIN — HEPARIN SODIUM 5000 UNIT(S): 5000 INJECTION INTRAVENOUS; SUBCUTANEOUS at 13:59

## 2023-01-16 RX ADMIN — Medication 650 MILLIGRAM(S): at 20:40

## 2023-01-16 NOTE — PROGRESS NOTE ADULT - SUBJECTIVE AND OBJECTIVE BOX
Cc: Impaired mobility     HPI: Patient with no new medical issues today.  Pain controlled, no chest pain, no N/V, no Fevers/Chills. No other new ROS  Has been tolerating rehabilitation program.    acetaminophen     Tablet .. 650 milliGRAM(s) Oral every 6 hours PRN  allopurinol 100 milliGRAM(s) Oral <User Schedule>  aspirin  chewable 81 milliGRAM(s) Oral daily  atorvastatin 80 milliGRAM(s) Oral at bedtime  carvedilol 3.125 milliGRAM(s) Oral every 12 hours  chlorhexidine 2% Cloths 1 Application(s) Topical daily  clopidogrel Tablet 75 milliGRAM(s) Oral daily  dextrose 5%. 1000 milliLiter(s) IV Continuous <Continuous>  dextrose 5%. 1000 milliLiter(s) IV Continuous <Continuous>  dextrose 50% Injectable 25 Gram(s) IV Push once  dextrose 50% Injectable 12.5 Gram(s) IV Push once  dextrose 50% Injectable 25 Gram(s) IV Push once  dextrose Oral Gel 15 Gram(s) Oral once PRN  epoetin wayne-epbx (RETACRIT) Injectable 36974 Unit(s) IV Push <User Schedule>  famotidine    Tablet 10 milliGRAM(s) Oral daily  folic acid 1 milliGRAM(s) Oral daily  gabapentin 300 milliGRAM(s) Oral at bedtime  glucagon  Injectable 1 milliGRAM(s) IntraMuscular once  heparin   Injectable 5000 Unit(s) SubCutaneous every 8 hours  insulin glargine Injectable (LANTUS) 16 Unit(s) SubCutaneous at bedtime  insulin lispro (ADMELOG) corrective regimen sliding scale   SubCutaneous three times a day with meals  insulin lispro (ADMELOG) corrective regimen sliding scale   SubCutaneous at bedtime  lidocaine/prilocaine Cream 1 Application(s) Topical <User Schedule> PRN  melatonin 3 milliGRAM(s) Oral at bedtime PRN  polyethylene glycol 3350 17 Gram(s) Oral two times a day  senna 2 Tablet(s) Oral at bedtime  tamsulosin 0.4 milliGRAM(s) Oral at bedtime  zinc oxide 40% Paste 1 Application(s) Topical daily      T(C): 36.8 (01-15-23 @ 20:20), Max: 36.8 (01-15-23 @ 20:20)  HR: 85 (01-16-23 @ 06:46) (67 - 92)  BP: 119/61 (01-16-23 @ 06:46) (119/61 - 149/69)  RR: 14 (01-15-23 @ 20:20) (14 - 14)  SpO2: 97% (01-15-23 @ 20:20) (97% - 97%)    In NAD  HEENT- EOMI  Heart- No cyanosis  Lungs- No respiratory distress   Abd- + BS, NT  Ext- No calf pain  Neuro- Exam unchanged                          9.4    6.57  )-----------( 359      ( 14 Jan 2023 17:42 )             29.6           Imp: Patient with diagnosis of SDH admitted for comprehensive acute rehabilitation.    Plan:  - Continue PT/OT/SLP  - DVT prophylaxis - Heparin  - Skin- Turn q2h, check skin daily  - Continue current medications; patient medically stable.   -Active issues-   -CAD - Plavix, aspirin  -Anemia -  Nephrology following, monitor   -HTN - carvedilol   -CKD -  HD   -Constipation-  senna, miralax   - Patient is stable to continue current rehabilitation program.
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
## 2023-01-16 NOTE — PROGRESS NOTE ADULT - SUBJECTIVE AND OBJECTIVE BOX
No distress    Vital Signs Last 24 Hrs  T(C): 36.6 (01-16-23 @ 19:40), Max: 36.7 (01-16-23 @ 09:40)  T(F): 97.8 (01-16-23 @ 19:40), Max: 98 (01-16-23 @ 09:40)  HR: 85 (01-16-23 @ 19:40) (85 - 89)  BP: 145/70 (01-16-23 @ 19:40) (119/61 - 151/86)  RR: 15 (01-16-23 @ 19:40) (14 - 15)  SpO2: 99% (01-16-23 @ 19:40) (98% - 100%)    I&O's Detail    15 Adrian 2023 07:01  -  16 Jan 2023 07:00  --------------------------------------------------------  OUT:    Voided (mL): 650 mL  Total OUT: 650 ml    16 Jan 2023 07:01  -  16 Jan 2023 20:25  --------------------------------------------------------  IN:    Oral Fluid: 480 mL  Total IN: 480 mL    OUT:    Voided (mL): 500 mL  Total OUT: 500 mL    Total NET: -20 mL    s1s2  b/l air entry  soft, ND  no edema                                                                                      acetaminophen     Tablet .. 650 milliGRAM(s) Oral every 6 hours PRN  allopurinol 100 milliGRAM(s) Oral <User Schedule>  aspirin  chewable 81 milliGRAM(s) Oral daily  atorvastatin 80 milliGRAM(s) Oral at bedtime  carvedilol 3.125 milliGRAM(s) Oral every 12 hours  chlorhexidine 2% Cloths 1 Application(s) Topical daily  clopidogrel Tablet 75 milliGRAM(s) Oral daily  dextrose 5%. 1000 milliLiter(s) IV Continuous <Continuous>  dextrose 5%. 1000 milliLiter(s) IV Continuous <Continuous>  dextrose 50% Injectable 25 Gram(s) IV Push once  dextrose 50% Injectable 12.5 Gram(s) IV Push once  dextrose 50% Injectable 25 Gram(s) IV Push once  dextrose Oral Gel 15 Gram(s) Oral once PRN  epoetin wayne-epbx (RETACRIT) Injectable 77918 Unit(s) IV Push <User Schedule>  famotidine    Tablet 10 milliGRAM(s) Oral daily  folic acid 1 milliGRAM(s) Oral daily  gabapentin 300 milliGRAM(s) Oral at bedtime  glucagon  Injectable 1 milliGRAM(s) IntraMuscular once  heparin   Injectable 5000 Unit(s) SubCutaneous every 8 hours  insulin glargine Injectable (LANTUS) 16 Unit(s) SubCutaneous at bedtime  insulin lispro (ADMELOG) corrective regimen sliding scale   SubCutaneous three times a day with meals  insulin lispro (ADMELOG) corrective regimen sliding scale   SubCutaneous at bedtime  lidocaine/prilocaine Cream 1 Application(s) Topical <User Schedule> PRN  melatonin 3 milliGRAM(s) Oral at bedtime PRN  polyethylene glycol 3350 17 Gram(s) Oral two times a day  senna 2 Tablet(s) Oral at bedtime  tamsulosin 0.4 milliGRAM(s) Oral at bedtime  zinc oxide 40% Paste 1 Application(s) Topical daily    A/P:    DM, HTN, CM  S/p SDH  ESRD on HD TTS  Fluid removal w/HD 2kg as able  Bld work w/HD  Epoetin w/HD  Rehab    [phone]

## 2023-01-17 LAB
ALBUMIN SERPL ELPH-MCNC: 2.9 G/DL — LOW (ref 3.3–5)
ALP SERPL-CCNC: 105 U/L — SIGNIFICANT CHANGE UP (ref 40–120)
ALT FLD-CCNC: 10 U/L — SIGNIFICANT CHANGE UP (ref 10–45)
ANION GAP SERPL CALC-SCNC: 16 MMOL/L — SIGNIFICANT CHANGE UP (ref 5–17)
AST SERPL-CCNC: 10 U/L — SIGNIFICANT CHANGE UP (ref 10–40)
BILIRUB SERPL-MCNC: 0.3 MG/DL — SIGNIFICANT CHANGE UP (ref 0.2–1.2)
BUN SERPL-MCNC: 97 MG/DL — HIGH (ref 7–23)
CALCIUM SERPL-MCNC: 8.2 MG/DL — LOW (ref 8.4–10.5)
CHLORIDE SERPL-SCNC: 94 MMOL/L — LOW (ref 96–108)
CO2 SERPL-SCNC: 22 MMOL/L — SIGNIFICANT CHANGE UP (ref 22–31)
CREAT SERPL-MCNC: 5.37 MG/DL — HIGH (ref 0.5–1.3)
EGFR: 11 ML/MIN/1.73M2 — LOW
GLUCOSE BLDC GLUCOMTR-MCNC: 145 MG/DL — HIGH (ref 70–99)
GLUCOSE BLDC GLUCOMTR-MCNC: 170 MG/DL — HIGH (ref 70–99)
GLUCOSE BLDC GLUCOMTR-MCNC: 245 MG/DL — HIGH (ref 70–99)
GLUCOSE BLDC GLUCOMTR-MCNC: 286 MG/DL — HIGH (ref 70–99)
GLUCOSE SERPL-MCNC: 118 MG/DL — HIGH (ref 70–99)
HCT VFR BLD CALC: 24.7 % — LOW (ref 39–50)
HGB BLD-MCNC: 8.1 G/DL — LOW (ref 13–17)
MCHC RBC-ENTMCNC: 27.9 PG — SIGNIFICANT CHANGE UP (ref 27–34)
MCHC RBC-ENTMCNC: 32.8 GM/DL — SIGNIFICANT CHANGE UP (ref 32–36)
MCV RBC AUTO: 85.2 FL — SIGNIFICANT CHANGE UP (ref 80–100)
NRBC # BLD: 0 /100 WBCS — SIGNIFICANT CHANGE UP (ref 0–0)
PHOSPHATE SERPL-MCNC: 6 MG/DL — HIGH (ref 2.5–4.5)
PLATELET # BLD AUTO: 297 K/UL — SIGNIFICANT CHANGE UP (ref 150–400)
POTASSIUM SERPL-MCNC: 4.6 MMOL/L — SIGNIFICANT CHANGE UP (ref 3.5–5.3)
POTASSIUM SERPL-SCNC: 4.6 MMOL/L — SIGNIFICANT CHANGE UP (ref 3.5–5.3)
PROT SERPL-MCNC: 6.7 G/DL — SIGNIFICANT CHANGE UP (ref 6–8.3)
RBC # BLD: 2.9 M/UL — LOW (ref 4.2–5.8)
RBC # FLD: 16.2 % — HIGH (ref 10.3–14.5)
SODIUM SERPL-SCNC: 132 MMOL/L — LOW (ref 135–145)
WBC # BLD: 9.4 K/UL — SIGNIFICANT CHANGE UP (ref 3.8–10.5)
WBC # FLD AUTO: 9.4 K/UL — SIGNIFICANT CHANGE UP (ref 3.8–10.5)

## 2023-01-17 PROCEDURE — 99232 SBSQ HOSP IP/OBS MODERATE 35: CPT

## 2023-01-17 PROCEDURE — 99233 SBSQ HOSP IP/OBS HIGH 50: CPT

## 2023-01-17 RX ORDER — ACETAMINOPHEN 500 MG
650 TABLET ORAL
Refills: 0 | Status: DISCONTINUED | OUTPATIENT
Start: 2023-01-17 | End: 2023-01-21

## 2023-01-17 RX ORDER — INSULIN GLARGINE 100 [IU]/ML
18 INJECTION, SOLUTION SUBCUTANEOUS AT BEDTIME
Refills: 0 | Status: DISCONTINUED | OUTPATIENT
Start: 2023-01-17 | End: 2023-01-25

## 2023-01-17 RX ORDER — SEVELAMER CARBONATE 2400 MG/1
800 POWDER, FOR SUSPENSION ORAL
Refills: 0 | Status: DISCONTINUED | OUTPATIENT
Start: 2023-01-17 | End: 2023-01-27

## 2023-01-17 RX ADMIN — Medication 81 MILLIGRAM(S): at 11:39

## 2023-01-17 RX ADMIN — Medication 650 MILLIGRAM(S): at 21:13

## 2023-01-17 RX ADMIN — GABAPENTIN 300 MILLIGRAM(S): 400 CAPSULE ORAL at 21:13

## 2023-01-17 RX ADMIN — CARVEDILOL PHOSPHATE 3.12 MILLIGRAM(S): 80 CAPSULE, EXTENDED RELEASE ORAL at 06:14

## 2023-01-17 RX ADMIN — CHLORHEXIDINE GLUCONATE 1 APPLICATION(S): 213 SOLUTION TOPICAL at 11:38

## 2023-01-17 RX ADMIN — HEPARIN SODIUM 5000 UNIT(S): 5000 INJECTION INTRAVENOUS; SUBCUTANEOUS at 21:14

## 2023-01-17 RX ADMIN — Medication 650 MILLIGRAM(S): at 16:21

## 2023-01-17 RX ADMIN — CLOPIDOGREL BISULFATE 75 MILLIGRAM(S): 75 TABLET, FILM COATED ORAL at 11:39

## 2023-01-17 RX ADMIN — HEPARIN SODIUM 5000 UNIT(S): 5000 INJECTION INTRAVENOUS; SUBCUTANEOUS at 13:54

## 2023-01-17 RX ADMIN — POLYETHYLENE GLYCOL 3350 17 GRAM(S): 17 POWDER, FOR SOLUTION ORAL at 18:12

## 2023-01-17 RX ADMIN — FAMOTIDINE 10 MILLIGRAM(S): 10 INJECTION INTRAVENOUS at 11:40

## 2023-01-17 RX ADMIN — ZINC OXIDE 1 APPLICATION(S): 200 OINTMENT TOPICAL at 11:39

## 2023-01-17 RX ADMIN — ATORVASTATIN CALCIUM 80 MILLIGRAM(S): 80 TABLET, FILM COATED ORAL at 21:13

## 2023-01-17 RX ADMIN — CARVEDILOL PHOSPHATE 3.12 MILLIGRAM(S): 80 CAPSULE, EXTENDED RELEASE ORAL at 18:13

## 2023-01-17 RX ADMIN — INSULIN GLARGINE 18 UNIT(S): 100 INJECTION, SOLUTION SUBCUTANEOUS at 21:15

## 2023-01-17 RX ADMIN — Medication 1 MILLIGRAM(S): at 11:41

## 2023-01-17 RX ADMIN — Medication 8: at 11:37

## 2023-01-17 RX ADMIN — TAMSULOSIN HYDROCHLORIDE 0.4 MILLIGRAM(S): 0.4 CAPSULE ORAL at 21:14

## 2023-01-17 RX ADMIN — Medication 4: at 07:53

## 2023-01-17 RX ADMIN — Medication 100 MILLIGRAM(S): at 08:13

## 2023-01-17 RX ADMIN — LIDOCAINE AND PRILOCAINE CREAM 1 APPLICATION(S): 25; 25 CREAM TOPICAL at 13:59

## 2023-01-17 RX ADMIN — ERYTHROPOIETIN 10000 UNIT(S): 10000 INJECTION, SOLUTION INTRAVENOUS; SUBCUTANEOUS at 16:22

## 2023-01-17 RX ADMIN — HEPARIN SODIUM 5000 UNIT(S): 5000 INJECTION INTRAVENOUS; SUBCUTANEOUS at 06:14

## 2023-01-17 RX ADMIN — Medication 650 MILLIGRAM(S): at 21:30

## 2023-01-17 NOTE — CHART NOTE - NSCHARTNOTEFT_GEN_A_CORE
Nutrition Follow Up Note  Hospital Course   (Per Electronic Medical Record)    Source:  Patient [X]  Medical Record [X]      Diet: Consistent Carbohydrate {Evening Snack}  For patients receiving Renal Replacement - No Protein Restr, No Conc K, No Conc Phos, Low Sodium    Pt tolerating diet well at this time. States consuming 100% of meal tray. Denied education on renal & consistent carbohydrate diet. A1C 7.9% noted, prednisone use noted. Denies difficulty chewing/swallowing. Denies N/V/D/C.     Enteral/Parenteral Nutrition: Not Applicable    Current Weight: lb on /    Pertinent Medications: MEDICATIONS  (STANDING):  allopurinol 100 milliGRAM(s) Oral <User Schedule>  aspirin  chewable 81 milliGRAM(s) Oral daily  atorvastatin 80 milliGRAM(s) Oral at bedtime  carvedilol 3.125 milliGRAM(s) Oral every 12 hours  chlorhexidine 2% Cloths 1 Application(s) Topical daily  clopidogrel Tablet 75 milliGRAM(s) Oral daily  dextrose 5%. 1000 milliLiter(s) (100 mL/Hr) IV Continuous <Continuous>  dextrose 5%. 1000 milliLiter(s) (50 mL/Hr) IV Continuous <Continuous>  dextrose 50% Injectable 25 Gram(s) IV Push once  dextrose 50% Injectable 12.5 Gram(s) IV Push once  dextrose 50% Injectable 25 Gram(s) IV Push once  epoetin wayne-epbx (RETACRIT) Injectable 85494 Unit(s) IV Push <User Schedule>  famotidine    Tablet 10 milliGRAM(s) Oral daily  folic acid 1 milliGRAM(s) Oral daily  gabapentin 300 milliGRAM(s) Oral at bedtime  glucagon  Injectable 1 milliGRAM(s) IntraMuscular once  heparin   Injectable 5000 Unit(s) SubCutaneous every 8 hours  insulin glargine Injectable (LANTUS) 16 Unit(s) SubCutaneous at bedtime  insulin lispro (ADMELOG) corrective regimen sliding scale   SubCutaneous three times a day with meals  insulin lispro (ADMELOG) corrective regimen sliding scale   SubCutaneous at bedtime  polyethylene glycol 3350 17 Gram(s) Oral two times a day  senna 2 Tablet(s) Oral at bedtime  tamsulosin 0.4 milliGRAM(s) Oral at bedtime  zinc oxide 40% Paste 1 Application(s) Topical daily    MEDICATIONS  (PRN):  acetaminophen     Tablet .. 650 milliGRAM(s) Oral every 6 hours PRN Temp greater or equal to 38C (100.4F), Mild Pain (1 - 3)  dextrose Oral Gel 15 Gram(s) Oral once PRN Blood Glucose LESS THAN 70 milliGRAM(s)/deciliter  lidocaine/prilocaine Cream 1 Application(s) Topical <User Schedule> PRN on dialysis days  melatonin 3 milliGRAM(s) Oral at bedtime PRN Insomnia      Pertinent Labs:   01-12 Phos 3.9 mg/dL 01-12 Alb 2.4 g/dL<L>        Skin:    Edema:     Last Bowel Movement: on 10/    Estimated Needs:   [X] No Change Since Previous Assessment    Previous Nutrition Diagnosis:     Nutrition Diagnosis is [X] Ongoing -     [X] Resolved -    [X] Not Applicable     New Nutrition Diagnosis: [X] Not Applicable  [X] Chewing Difficulties    [X] Swallowing Difficulties    [X] Predicted Suboptimal Energy Intake  [X] Obesity   [X] Inadequate Oral Intake   [X] Increased Nutrient Needs   [X] Excessive Energy Intake   [X] Altered GI Function   [X] Unintended Weight Loss   [X] Food & Nutrition Related Knowledge Deficit  [X] Limited Adherence to Nutrition Related Recommendations   [X] Altered Nutrition Related Lab  [X] Mild Moderate Severe Malnutrition      Interventions:   1.   2. Recommend Continue Nutrition Plan of Care    Monitoring & Evaluation:   [X] Weights   [X] PO Intake   [X] Skin Integrity   [X] Follow Up (Per Protocol)  [X] Tolerance to Diet Prescription   [X] Other: Labs & POCT    Registered Dietitian/Nutritionist Remains Available.  Ramón Riley RDN    Phone# [phone] Nutrition Follow Up Note  Hospital Course   (Per Electronic Medical Record)    Source:  Patient [X]  Medical Record [X]      Diet: Consistent Carbohydrate {Evening Snack}  For patients receiving Renal Replacement - No Protein Restr, No Conc K, No Conc Phos, Low Sodium    Pt tolerating diet well at this time. States consuming 100% of meal tray. Requests 2x protein at meals. Denied education on renal & consistent carbohydrate diet. A1C 7.9% noted, prednisone use noted. Denies difficulty chewing/swallowing. Denies N/V/D/C. PI on Left Heel & Right Buttocks - Recommended Nephrovite + Vitamin C. Last bm 1/17 as per patient.     Enteral/Parenteral Nutrition: Not Applicable    Current Weight: 149.9lb on 1/14  151.6lb/69kg on 1/12     Pertinent Medications: MEDICATIONS  (STANDING):  allopurinol 100 milliGRAM(s) Oral <User Schedule>  aspirin  chewable 81 milliGRAM(s) Oral daily  atorvastatin 80 milliGRAM(s) Oral at bedtime  carvedilol 3.125 milliGRAM(s) Oral every 12 hours  chlorhexidine 2% Cloths 1 Application(s) Topical daily  clopidogrel Tablet 75 milliGRAM(s) Oral daily  dextrose 5%. 1000 milliLiter(s) (100 mL/Hr) IV Continuous <Continuous>  dextrose 5%. 1000 milliLiter(s) (50 mL/Hr) IV Continuous <Continuous>  dextrose 50% Injectable 25 Gram(s) IV Push once  dextrose 50% Injectable 12.5 Gram(s) IV Push once  dextrose 50% Injectable 25 Gram(s) IV Push once  epoetin wayne-epbx (RETACRIT) Injectable 03749 Unit(s) IV Push <User Schedule>  famotidine    Tablet 10 milliGRAM(s) Oral daily  folic acid 1 milliGRAM(s) Oral daily  gabapentin 300 milliGRAM(s) Oral at bedtime  glucagon  Injectable 1 milliGRAM(s) IntraMuscular once  heparin   Injectable 5000 Unit(s) SubCutaneous every 8 hours  insulin glargine Injectable (LANTUS) 16 Unit(s) SubCutaneous at bedtime  insulin lispro (ADMELOG) corrective regimen sliding scale   SubCutaneous three times a day with meals  insulin lispro (ADMELOG) corrective regimen sliding scale   SubCutaneous at bedtime  polyethylene glycol 3350 17 Gram(s) Oral two times a day  senna 2 Tablet(s) Oral at bedtime  tamsulosin 0.4 milliGRAM(s) Oral at bedtime  zinc oxide 40% Paste 1 Application(s) Topical daily    MEDICATIONS  (PRN):  acetaminophen     Tablet .. 650 milliGRAM(s) Oral every 6 hours PRN Temp greater or equal to 38C (100.4F), Mild Pain (1 - 3)  dextrose Oral Gel 15 Gram(s) Oral once PRN Blood Glucose LESS THAN 70 milliGRAM(s)/deciliter  lidocaine/prilocaine Cream 1 Application(s) Topical <User Schedule> PRN on dialysis days  melatonin 3 milliGRAM(s) Oral at bedtime PRN Insomnia      Pertinent Labs:   01-12 Phos 3.9 mg/dL 01-12 Alb 2.4 g/dL<L>  POCT 125 - 275 over last 3 days       Skin: Left Heel + Right Buttocks (see flow sheet)     Edema: None Noted    Last Bowel Movement: on 1/16 as per flow sheet   1/17 as per patient    Estimated Needs:   [X] No Change Since Previous Assessment    Previous Nutrition Diagnosis:     Nutrition Diagnosis is [X] Ongoing - Increased nutrient needs related to increased demand for wound healing as evidenced by stage II pressure ulcer, DTI left heel, +HD        Interventions:   1. Recommened Nephrovite + Vitamin C  2. Recommend Continue Nutrition Plan of Care  3. 2x protein at meals    Monitoring & Evaluation:   [X] Weights   [X] PO Intake   [X] Skin Integrity   [X] Follow Up (Per Protocol)  [X] Tolerance to Diet Prescription   [X] Other: Labs & POCT    Registered Dietitian/Nutritionist Remains Available.  Chris Fairchild Dietetic Intern    Phone# [phone]
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
## 2023-01-17 NOTE — PROGRESS NOTE ADULT - SUBJECTIVE AND OBJECTIVE BOX
No distress    Vital Signs Last 24 Hrs  T(C): 36.8 (01-17-23 @ 20:25), Max: 36.8 (01-17-23 @ 17:30)  T(F): 98.3 (01-17-23 @ 20:25), Max: 98.3 (01-17-23 @ 20:25)  HR: 88 (01-17-23 @ 20:25) (85 - 96)  BP: 130/69 (01-17-23 @ 20:25) (130/69 - 154/80)  RR: 16 (01-17-23 @ 20:25) (15 - 17)  SpO2: 97% (01-17-23 @ 20:25) (96% - 97%)    I&O's Detail    16 Jan 2023 07:01  -  17 Jan 2023 07:00  --------------------------------------------------------  IN:    Oral Fluid: 480 mL  Total IN: 480 mL    OUT:    Voided (mL): 500 mL  Total OUT: 500 mL    Total NET: -20 mL    17 Jan 2023 07:01  -  17 Jan 2023 21:28  --------------------------------------------------------  IN:    Other (mL): 800 mL  Total IN: 800 mL    OUT:    Other (mL): 2800 mL  Total OUT: 2800 mL    Total NET: -2000 mL    s1s2  b/l air entry  soft, ND  no edema                                    8.1    9.40  )-----------( 297      ( 17 Jan 2023 14:47 )             24.7     17 Jan 2023 14:47    132    |  94     |  97     ----------------------------<  118    4.6     |  22     |  5.37     Ca    8.2        17 Jan 2023 14:47  Phos  6.0       17 Jan 2023 14:47    TPro  6.7    /  Alb  2.9    /  TBili  0.3    /  DBili  x      /  AST  10     /  ALT  10     /  AlkPhos  105    17 Jan 2023 14:47    LIVER FUNCTIONS - ( 17 Jan 2023 14:47 )  Alb: 2.9 g/dL / Pro: 6.7 g/dL / ALK PHOS: 105 U/L / ALT: 10 U/L / AST: 10 U/L / GGT: x                                                                acetaminophen     Tablet .. 650 milliGRAM(s) Oral every 6 hours PRN  allopurinol 100 milliGRAM(s) Oral <User Schedule>  aspirin  chewable 81 milliGRAM(s) Oral daily  atorvastatin 80 milliGRAM(s) Oral at bedtime  carvedilol 3.125 milliGRAM(s) Oral every 12 hours  chlorhexidine 2% Cloths 1 Application(s) Topical daily  clopidogrel Tablet 75 milliGRAM(s) Oral daily  dextrose 5%. 1000 milliLiter(s) IV Continuous <Continuous>  dextrose 5%. 1000 milliLiter(s) IV Continuous <Continuous>  dextrose 50% Injectable 25 Gram(s) IV Push once  dextrose 50% Injectable 12.5 Gram(s) IV Push once  dextrose 50% Injectable 25 Gram(s) IV Push once  dextrose Oral Gel 15 Gram(s) Oral once PRN  epoetin wayne-epbx (RETACRIT) Injectable 65027 Unit(s) IV Push <User Schedule>  famotidine    Tablet 10 milliGRAM(s) Oral daily  folic acid 1 milliGRAM(s) Oral daily  gabapentin 300 milliGRAM(s) Oral at bedtime  glucagon  Injectable 1 milliGRAM(s) IntraMuscular once  heparin   Injectable 5000 Unit(s) SubCutaneous every 8 hours  insulin glargine Injectable (LANTUS) 16 Unit(s) SubCutaneous at bedtime  insulin lispro (ADMELOG) corrective regimen sliding scale   SubCutaneous three times a day with meals  insulin lispro (ADMELOG) corrective regimen sliding scale   SubCutaneous at bedtime  lidocaine/prilocaine Cream 1 Application(s) Topical <User Schedule> PRN  melatonin 3 milliGRAM(s) Oral at bedtime PRN  polyethylene glycol 3350 17 Gram(s) Oral two times a day  senna 2 Tablet(s) Oral at bedtime  tamsulosin 0.4 milliGRAM(s) Oral at bedtime  zinc oxide 40% Paste 1 Application(s) Topical daily    A/P:    DM, HTN, CM  S/p SDH  ESRD on HD TTS  Fluid removal w/HD 2kg today  Bld work w/HD  Epoetin w/HD  Rehab    [phone]
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
## 2023-01-18 LAB
GLUCOSE BLDC GLUCOMTR-MCNC: 128 MG/DL — HIGH (ref 70–99)
GLUCOSE BLDC GLUCOMTR-MCNC: 145 MG/DL — HIGH (ref 70–99)
GLUCOSE BLDC GLUCOMTR-MCNC: 249 MG/DL — HIGH (ref 70–99)
GLUCOSE BLDC GLUCOMTR-MCNC: 269 MG/DL — HIGH (ref 70–99)

## 2023-01-18 PROCEDURE — 99232 SBSQ HOSP IP/OBS MODERATE 35: CPT

## 2023-01-18 RX ORDER — HYDROCORTISONE 1 %
1 OINTMENT (GRAM) TOPICAL DAILY
Refills: 0 | Status: DISCONTINUED | OUTPATIENT
Start: 2023-01-18 | End: 2023-01-27

## 2023-01-18 RX ADMIN — CARVEDILOL PHOSPHATE 3.12 MILLIGRAM(S): 80 CAPSULE, EXTENDED RELEASE ORAL at 17:22

## 2023-01-18 RX ADMIN — INSULIN GLARGINE 18 UNIT(S): 100 INJECTION, SOLUTION SUBCUTANEOUS at 21:37

## 2023-01-18 RX ADMIN — CARVEDILOL PHOSPHATE 3.12 MILLIGRAM(S): 80 CAPSULE, EXTENDED RELEASE ORAL at 05:09

## 2023-01-18 RX ADMIN — TAMSULOSIN HYDROCHLORIDE 0.4 MILLIGRAM(S): 0.4 CAPSULE ORAL at 21:38

## 2023-01-18 RX ADMIN — HEPARIN SODIUM 5000 UNIT(S): 5000 INJECTION INTRAVENOUS; SUBCUTANEOUS at 21:40

## 2023-01-18 RX ADMIN — SEVELAMER CARBONATE 800 MILLIGRAM(S): 2400 POWDER, FOR SUSPENSION ORAL at 17:22

## 2023-01-18 RX ADMIN — ATORVASTATIN CALCIUM 80 MILLIGRAM(S): 80 TABLET, FILM COATED ORAL at 21:37

## 2023-01-18 RX ADMIN — HEPARIN SODIUM 5000 UNIT(S): 5000 INJECTION INTRAVENOUS; SUBCUTANEOUS at 05:09

## 2023-01-18 RX ADMIN — Medication 1 MILLIGRAM(S): at 11:28

## 2023-01-18 RX ADMIN — Medication 1 APPLICATION(S): at 21:35

## 2023-01-18 RX ADMIN — SEVELAMER CARBONATE 800 MILLIGRAM(S): 2400 POWDER, FOR SUSPENSION ORAL at 11:39

## 2023-01-18 RX ADMIN — CHLORHEXIDINE GLUCONATE 1 APPLICATION(S): 213 SOLUTION TOPICAL at 16:04

## 2023-01-18 RX ADMIN — Medication 6: at 11:38

## 2023-01-18 RX ADMIN — Medication 650 MILLIGRAM(S): at 21:38

## 2023-01-18 RX ADMIN — HEPARIN SODIUM 5000 UNIT(S): 5000 INJECTION INTRAVENOUS; SUBCUTANEOUS at 16:05

## 2023-01-18 RX ADMIN — FAMOTIDINE 10 MILLIGRAM(S): 10 INJECTION INTRAVENOUS at 11:28

## 2023-01-18 RX ADMIN — Medication 81 MILLIGRAM(S): at 11:29

## 2023-01-18 RX ADMIN — SEVELAMER CARBONATE 800 MILLIGRAM(S): 2400 POWDER, FOR SUSPENSION ORAL at 07:49

## 2023-01-18 RX ADMIN — Medication 25 MILLIGRAM(S): at 21:37

## 2023-01-18 RX ADMIN — Medication 1: at 21:35

## 2023-01-18 RX ADMIN — Medication 650 MILLIGRAM(S): at 22:00

## 2023-01-18 RX ADMIN — CLOPIDOGREL BISULFATE 75 MILLIGRAM(S): 75 TABLET, FILM COATED ORAL at 11:28

## 2023-01-18 RX ADMIN — Medication 100 MILLIGRAM(S): at 09:59
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
## 2023-01-18 NOTE — PROGRESS NOTE ADULT - SUBJECTIVE AND OBJECTIVE BOX
No distress    Vital Signs Last 24 Hrs  T(C): 36.9 (01-18-23 @ 21:49), Max: 36.9 (01-18-23 @ 21:49)  T(F): 98.4 (01-18-23 @ 21:49), Max: 98.4 (01-18-23 @ 21:49)  HR: 90 (01-18-23 @ 21:49) (84 - 93)  BP: 152/77 (01-18-23 @ 21:49) (75/33 - 156/77)  RR: 15 (01-18-23 @ 21:49) (14 - 15)  SpO2: 97% (01-18-23 @ 21:49) (95% - 99%)    I&O's Detail    17 Jan 2023 07:01  -  18 Jan 2023 07:00  --------------------------------------------------------  IN:    Other (mL): 800 mL  Total IN: 800 mL    OUT:    Other (mL): 2800 mL    Voided (mL): 400 mL  Total OUT: 3200 mL    Total NET: -2400 mL    18 Jan 2023 07:01  -  18 Jan 2023 22:46  --------------------------------------------------------  OUT:    Voided (mL): 500 mL  Total OUT: 500 mL    s1s2  b/l air entry  soft, ND  no edema                                             8.1    9.40  )-----------( 297      ( 17 Jan 2023 14:47 )             24.7     17 Jan 2023 14:47    132    |  94     |  97     ----------------------------<  118    4.6     |  22     |  5.37     Ca    8.2        17 Jan 2023 14:47  Phos  6.0       17 Jan 2023 14:47    TPro  6.7    /  Alb  2.9    /  TBili  0.3    /  DBili  x      /  AST  10     /  ALT  10     /  AlkPhos  105    17 Jan 2023 14:47    LIVER FUNCTIONS - ( 17 Jan 2023 14:47 )  Alb: 2.9 g/dL / Pro: 6.7 g/dL / ALK PHOS: 105 U/L / ALT: 10 U/L / AST: 10 U/L / GGT: x           acetaminophen     Tablet .. 650 milliGRAM(s) Oral every 6 hours PRN  acetaminophen     Tablet .. 650 milliGRAM(s) Oral <User Schedule>  allopurinol 100 milliGRAM(s) Oral <User Schedule>  aspirin  chewable 81 milliGRAM(s) Oral daily  atorvastatin 80 milliGRAM(s) Oral at bedtime  carvedilol 3.125 milliGRAM(s) Oral every 12 hours  chlorhexidine 2% Cloths 1 Application(s) Topical daily  clopidogrel Tablet 75 milliGRAM(s) Oral daily  dextrose 5%. 1000 milliLiter(s) IV Continuous <Continuous>  dextrose 5%. 1000 milliLiter(s) IV Continuous <Continuous>  dextrose 50% Injectable 25 Gram(s) IV Push once  dextrose 50% Injectable 12.5 Gram(s) IV Push once  dextrose 50% Injectable 25 Gram(s) IV Push once  dextrose Oral Gel 15 Gram(s) Oral once PRN  epoetin wayne-epbx (RETACRIT) Injectable 21576 Unit(s) IV Push <User Schedule>  famotidine    Tablet 10 milliGRAM(s) Oral daily  folic acid 1 milliGRAM(s) Oral daily  glucagon  Injectable 1 milliGRAM(s) IntraMuscular once  heparin   Injectable 5000 Unit(s) SubCutaneous every 8 hours  hydrocortisone 1% Cream 1 Application(s) Topical daily  insulin glargine Injectable (LANTUS) 18 Unit(s) SubCutaneous at bedtime  insulin lispro (ADMELOG) corrective regimen sliding scale   SubCutaneous at bedtime  insulin lispro (ADMELOG) corrective regimen sliding scale   SubCutaneous three times a day with meals  lidocaine/prilocaine Cream 1 Application(s) Topical <User Schedule> PRN  melatonin 3 milliGRAM(s) Oral at bedtime PRN  polyethylene glycol 3350 17 Gram(s) Oral two times a day  pregabalin 25 milliGRAM(s) Oral at bedtime  senna 2 Tablet(s) Oral at bedtime  sevelamer carbonate 800 milliGRAM(s) Oral three times a day with meals  tamsulosin 0.4 milliGRAM(s) Oral at bedtime  zinc oxide 40% Paste 1 Application(s) Topical daily    A/P:    DM, HTN, CM  S/p SDH  ESRD on HD TTS  Episodes of low BP  Would follow off BP lowering meds  Fluid removal w/HD as able  Bld work w/HD  Epoetin w/HD  Rehab    [phone]

## 2023-01-18 NOTE — PROGRESS NOTE ADULT - SUBJECTIVE AND OBJECTIVE BOX
SUBJECTIVE/OBJECTIVE- Patient seen and examined. No acute overnight events, slept well. Continues to endorse neuropathic pain, more pronounced at night. Also states he has itching around his trunk      REVIEW OF SYSTEMS  Denies CP, SOB, abdominal pain    VITALS  62y  Vital Signs Last 24 Hrs  T(C): 36.7 (2023 08:09), Max: 36.8 (2023 17:30)  T(F): 98.1 (2023 08:09), Max: 98.3 (2023 20:25)  HR: 84 (2023 08:09) (84 - 93)  BP: 75/33 (2023 09:24) (75/33 - 154/80)  RR: 15 (2023 08:09) (14 - 17)  SpO2: 95% (2023 08:09) (95% - 99%)    Parameters below as of 2023 08:09  Patient On (Oxygen Delivery Method): room air      Daily     Daily Weight in k (2023 17:30)    PHYSICAL EXAM  Constitutional - NAD, Comfortable  HEENT - NCAT, EOMI  Chest - CTA bilaterally,   Cardiovascular - RRR,  warm well perfused  Abdomen - , Soft, NTND-- erythema around trunk   Extremities - No edema, left  calf tenderness;  Neurologic Exam -                    Cognitive - Awake, Alert, AAO x 3     Memory: delayed processing     Motor -                     LEFT    UE - ShAB 3/5, EF 3/5, EE 3/5, WE 3/5,  3/5.                          RIGHT UE - ShAB 4-/5, EF 4-/5, EE 4-/5, WE 4-/5,  3/5                      LEFT    LE - HF 1/5, KE 2/5, DF 1/5, PF 1/5    VITALS    RECENT LABS:                        8.1    9.40  )-----------( 297      ( 2023 14:47 )             24.7     01-17    132<L>  |  94<L>  |  97<H>  ----------------------------<  118<H>  4.6   |  22  |  5.37<H>    Ca    8.2<L>      2023 14:47  Phos  6.0     01-17    TPro  6.7  /  Alb  2.9<L>  /  TBili  0.3  /  DBili  x   /  AST  10  /  ALT  10  /  AlkPhos  105      LIVER FUNCTIONS - ( 2023 14:47 )  Alb: 2.9 g/dL / Pro: 6.7 g/dL / ALK PHOS: 105 U/L / ALT: 10 U/L / AST: 10 U/L / GGT: x             CAPILLARY BLOOD GLUCOSE      POCT Blood Glucose.: 249 mg/dL (2023 11:21)  POCT Blood Glucose.: 128 mg/dL (2023 07:45)  POCT Blood Glucose.: 245 mg/dL (2023 21:11)  POCT Blood Glucose.: 145 mg/dL (2023 18:06)      MEDICATIONS:  MEDICATIONS  (STANDING):  acetaminophen     Tablet .. 650 milliGRAM(s) Oral <User Schedule>  allopurinol 100 milliGRAM(s) Oral <User Schedule>  aspirin  chewable 81 milliGRAM(s) Oral daily  atorvastatin 80 milliGRAM(s) Oral at bedtime  carvedilol 3.125 milliGRAM(s) Oral every 12 hours  chlorhexidine 2% Cloths 1 Application(s) Topical daily  clopidogrel Tablet 75 milliGRAM(s) Oral daily  dextrose 5%. 1000 milliLiter(s) (100 mL/Hr) IV Continuous <Continuous>  dextrose 5%. 1000 milliLiter(s) (50 mL/Hr) IV Continuous <Continuous>  dextrose 50% Injectable 25 Gram(s) IV Push once  dextrose 50% Injectable 12.5 Gram(s) IV Push once  dextrose 50% Injectable 25 Gram(s) IV Push once  epoetin wayne-epbx (RETACRIT) Injectable 86157 Unit(s) IV Push <User Schedule>  famotidine    Tablet 10 milliGRAM(s) Oral daily  folic acid 1 milliGRAM(s) Oral daily  glucagon  Injectable 1 milliGRAM(s) IntraMuscular once  heparin   Injectable 5000 Unit(s) SubCutaneous every 8 hours  hydrocortisone 1% Cream 1 Application(s) Topical daily  insulin glargine Injectable (LANTUS) 18 Unit(s) SubCutaneous at bedtime  insulin lispro (ADMELOG) corrective regimen sliding scale   SubCutaneous at bedtime  insulin lispro (ADMELOG) corrective regimen sliding scale   SubCutaneous three times a day with meals  polyethylene glycol 3350 17 Gram(s) Oral two times a day  pregabalin 25 milliGRAM(s) Oral at bedtime  senna 2 Tablet(s) Oral at bedtime  sevelamer carbonate 800 milliGRAM(s) Oral three times a day with meals  tamsulosin 0.4 milliGRAM(s) Oral at bedtime  zinc oxide 40% Paste 1 Application(s) Topical daily    MEDICATIONS  (PRN):  acetaminophen     Tablet .. 650 milliGRAM(s) Oral every 6 hours PRN Temp greater or equal to 38C (100.4F), Mild Pain (1 - 3)  dextrose Oral Gel 15 Gram(s) Oral once PRN Blood Glucose LESS THAN 70 milliGRAM(s)/deciliter  lidocaine/prilocaine Cream 1 Application(s) Topical <User Schedule> PRN on dialysis days  melatonin 3 milliGRAM(s) Oral at bedtime PRN Insomnia

## 2023-01-19 LAB
ALBUMIN SERPL ELPH-MCNC: 2.6 G/DL — LOW (ref 3.3–5)
ALP SERPL-CCNC: 88 U/L — SIGNIFICANT CHANGE UP (ref 40–120)
ALT FLD-CCNC: 14 U/L — SIGNIFICANT CHANGE UP (ref 10–45)
ANION GAP SERPL CALC-SCNC: 11 MMOL/L — SIGNIFICANT CHANGE UP (ref 5–17)
AST SERPL-CCNC: 13 U/L — SIGNIFICANT CHANGE UP (ref 10–40)
BILIRUB SERPL-MCNC: 0.2 MG/DL — SIGNIFICANT CHANGE UP (ref 0.2–1.2)
BUN SERPL-MCNC: 84 MG/DL — HIGH (ref 7–23)
CALCIUM SERPL-MCNC: 8.8 MG/DL — SIGNIFICANT CHANGE UP (ref 8.4–10.5)
CHLORIDE SERPL-SCNC: 97 MMOL/L — SIGNIFICANT CHANGE UP (ref 96–108)
CO2 SERPL-SCNC: 26 MMOL/L — SIGNIFICANT CHANGE UP (ref 22–31)
CREAT SERPL-MCNC: 4.37 MG/DL — HIGH (ref 0.5–1.3)
EGFR: 14 ML/MIN/1.73M2 — LOW
GLUCOSE BLDC GLUCOMTR-MCNC: 129 MG/DL — HIGH (ref 70–99)
GLUCOSE BLDC GLUCOMTR-MCNC: 129 MG/DL — HIGH (ref 70–99)
GLUCOSE BLDC GLUCOMTR-MCNC: 207 MG/DL — HIGH (ref 70–99)
GLUCOSE BLDC GLUCOMTR-MCNC: 228 MG/DL — HIGH (ref 70–99)
GLUCOSE SERPL-MCNC: 169 MG/DL — HIGH (ref 70–99)
HCT VFR BLD CALC: 24.6 % — LOW (ref 39–50)
HGB BLD-MCNC: 7.9 G/DL — LOW (ref 13–17)
MCHC RBC-ENTMCNC: 27.7 PG — SIGNIFICANT CHANGE UP (ref 27–34)
MCHC RBC-ENTMCNC: 32.1 GM/DL — SIGNIFICANT CHANGE UP (ref 32–36)
MCV RBC AUTO: 86.3 FL — SIGNIFICANT CHANGE UP (ref 80–100)
NRBC # BLD: 0 /100 WBCS — SIGNIFICANT CHANGE UP (ref 0–0)
PLATELET # BLD AUTO: 257 K/UL — SIGNIFICANT CHANGE UP (ref 150–400)
POTASSIUM SERPL-MCNC: 4.4 MMOL/L — SIGNIFICANT CHANGE UP (ref 3.5–5.3)
POTASSIUM SERPL-SCNC: 4.4 MMOL/L — SIGNIFICANT CHANGE UP (ref 3.5–5.3)
PROT SERPL-MCNC: 6.5 G/DL — SIGNIFICANT CHANGE UP (ref 6–8.3)
RBC # BLD: 2.85 M/UL — LOW (ref 4.2–5.8)
RBC # FLD: 16.3 % — HIGH (ref 10.3–14.5)
SODIUM SERPL-SCNC: 134 MMOL/L — LOW (ref 135–145)
WBC # BLD: 8.25 K/UL — SIGNIFICANT CHANGE UP (ref 3.8–10.5)
WBC # FLD AUTO: 8.25 K/UL — SIGNIFICANT CHANGE UP (ref 3.8–10.5)

## 2023-01-19 PROCEDURE — 99232 SBSQ HOSP IP/OBS MODERATE 35: CPT

## 2023-01-19 PROCEDURE — 90832 PSYTX W PT 30 MINUTES: CPT

## 2023-01-19 RX ADMIN — SEVELAMER CARBONATE 800 MILLIGRAM(S): 2400 POWDER, FOR SUSPENSION ORAL at 18:08

## 2023-01-19 RX ADMIN — CHLORHEXIDINE GLUCONATE 1 APPLICATION(S): 213 SOLUTION TOPICAL at 12:11

## 2023-01-19 RX ADMIN — Medication 81 MILLIGRAM(S): at 12:10

## 2023-01-19 RX ADMIN — CLOPIDOGREL BISULFATE 75 MILLIGRAM(S): 75 TABLET, FILM COATED ORAL at 12:10

## 2023-01-19 RX ADMIN — ZINC OXIDE 1 APPLICATION(S): 200 OINTMENT TOPICAL at 12:09

## 2023-01-19 RX ADMIN — Medication 6: at 12:04

## 2023-01-19 RX ADMIN — ERYTHROPOIETIN 10000 UNIT(S): 10000 INJECTION, SOLUTION INTRAVENOUS; SUBCUTANEOUS at 16:29

## 2023-01-19 RX ADMIN — HEPARIN SODIUM 5000 UNIT(S): 5000 INJECTION INTRAVENOUS; SUBCUTANEOUS at 22:26

## 2023-01-19 RX ADMIN — Medication 650 MILLIGRAM(S): at 05:32

## 2023-01-19 RX ADMIN — Medication 25 MILLIGRAM(S): at 22:25

## 2023-01-19 RX ADMIN — SEVELAMER CARBONATE 800 MILLIGRAM(S): 2400 POWDER, FOR SUSPENSION ORAL at 12:10

## 2023-01-19 RX ADMIN — HEPARIN SODIUM 5000 UNIT(S): 5000 INJECTION INTRAVENOUS; SUBCUTANEOUS at 05:33

## 2023-01-19 RX ADMIN — SEVELAMER CARBONATE 800 MILLIGRAM(S): 2400 POWDER, FOR SUSPENSION ORAL at 08:45

## 2023-01-19 RX ADMIN — FAMOTIDINE 10 MILLIGRAM(S): 10 INJECTION INTRAVENOUS at 12:11

## 2023-01-19 RX ADMIN — Medication 650 MILLIGRAM(S): at 18:08

## 2023-01-19 RX ADMIN — POLYETHYLENE GLYCOL 3350 17 GRAM(S): 17 POWDER, FOR SOLUTION ORAL at 18:09

## 2023-01-19 RX ADMIN — HEPARIN SODIUM 5000 UNIT(S): 5000 INJECTION INTRAVENOUS; SUBCUTANEOUS at 13:19

## 2023-01-19 RX ADMIN — SENNA PLUS 2 TABLET(S): 8.6 TABLET ORAL at 22:26

## 2023-01-19 RX ADMIN — LIDOCAINE AND PRILOCAINE CREAM 1 APPLICATION(S): 25; 25 CREAM TOPICAL at 12:47

## 2023-01-19 RX ADMIN — ATORVASTATIN CALCIUM 80 MILLIGRAM(S): 80 TABLET, FILM COATED ORAL at 22:26

## 2023-01-19 RX ADMIN — Medication 650 MILLIGRAM(S): at 22:25

## 2023-01-19 RX ADMIN — Medication 650 MILLIGRAM(S): at 23:25

## 2023-01-19 RX ADMIN — TAMSULOSIN HYDROCHLORIDE 0.4 MILLIGRAM(S): 0.4 CAPSULE ORAL at 22:25

## 2023-01-19 RX ADMIN — Medication 100 MILLIGRAM(S): at 08:45

## 2023-01-19 RX ADMIN — INSULIN GLARGINE 18 UNIT(S): 100 INJECTION, SOLUTION SUBCUTANEOUS at 22:24

## 2023-01-19 RX ADMIN — Medication 1 APPLICATION(S): at 12:10

## 2023-01-19 RX ADMIN — Medication 1 MILLIGRAM(S): at 12:11

## 2023-01-19 NOTE — PROGRESS NOTE ADULT - SUBJECTIVE AND OBJECTIVE BOX
Pt was seen for 25 min for supportive tx. Pt c/o pain. Pt reported doing well since last seen. He is fully participating in tx and making significant gains in ambulation with a walker, performing ADLs with minimal assistance, as well as increased awareness about his cognitive deficits and working on them in SLP. Pt reported pain in his legs and feet due to neuropathy; he reported starting taking Lyrica yesterday with some relief in his pain sxs, and hopes it will help him to control them more consistently so his ambulation and sleep (he losses sleep due to pain) further improve. Pt expressed awareness that he will remain in [place] about two more weeks, when his wife who is currently out of the country will pick him up. Pt admitted missing his wife, but is okay with her attending family matters in the [facility]. Pt also expressed missing his pet dog and cats, especially one of the cats who is rather old, whom he fears might die before he returns home. Pt reported that his daughter is at home but he is not confident that she may take of the pets as he does. Pt denied episodes of anxiety or sadness, with stable euthymic mood generally. He reported good sleep when he does not have pain, good appetite, and good energy levels. Support and encouragement were provided.
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
## 2023-01-19 NOTE — PROGRESS NOTE ADULT - SUBJECTIVE AND OBJECTIVE BOX
Seen on HD    Vital Signs Last 24 Hrs  T(C): 36.9 (01-19-23 @ 19:44), Max: 37.2 (01-19-23 @ 17:30)  T(F): 98.5 (01-19-23 @ 19:44), Max: 98.9 (01-19-23 @ 17:30)  HR: 101 (01-19-23 @ 19:44) (89 - 101)  BP: 151/86 (01-19-23 @ 19:44) (107/61 - 157/72)  RR: 15 (01-19-23 @ 19:44) (15 - 17)  SpO2: 98% (01-19-23 @ 19:44) (98% - 100%)    I&O's Detail    18 Jan 2023 07:01  -  19 Jan 2023 07:00  --------------------------------------------------------  OUT:    Voided (mL): 740 mL  Total OUT: 740 mL    19 Jan 2023 07:01  -  19 Jan 2023 23:45  --------------------------------------------------------  IN:    Other (mL): 800 mL  Total IN: 800 mL    OUT:    Other (mL): 1800 mL  Total OUT: 1800 mL    Total NET: -1000 mL    s1s2  b/l air entry  soft, ND  no edema                                                      7.9    8.25  )-----------( 257      ( 19 Jan 2023 14:30 )             24.6     19 Jan 2023 14:30    134    |  97     |  84     ----------------------------<  169    4.4     |  26     |  4.37     Ca    8.8        19 Jan 2023 14:30    TPro  6.5    /  Alb  2.6    /  TBili  0.2    /  DBili  x      /  AST  13     /  ALT  14     /  AlkPhos  88     19 Jan 2023 14:30    LIVER FUNCTIONS - ( 19 Jan 2023 14:30 )  Alb: 2.6 g/dL / Pro: 6.5 g/dL / ALK PHOS: 88 U/L / ALT: 14 U/L / AST: 13 U/L / GGT: x           acetaminophen     Tablet .. 650 milliGRAM(s) Oral every 6 hours PRN  acetaminophen     Tablet .. 650 milliGRAM(s) Oral <User Schedule>  allopurinol 100 milliGRAM(s) Oral <User Schedule>  aspirin  chewable 81 milliGRAM(s) Oral daily  atorvastatin 80 milliGRAM(s) Oral at bedtime  chlorhexidine 2% Cloths 1 Application(s) Topical daily  clopidogrel Tablet 75 milliGRAM(s) Oral daily  dextrose 5%. 1000 milliLiter(s) IV Continuous <Continuous>  dextrose 5%. 1000 milliLiter(s) IV Continuous <Continuous>  dextrose 50% Injectable 12.5 Gram(s) IV Push once  dextrose 50% Injectable 25 Gram(s) IV Push once  dextrose 50% Injectable 25 Gram(s) IV Push once  dextrose Oral Gel 15 Gram(s) Oral once PRN  epoetin wayne-epbx (RETACRIT) Injectable 23021 Unit(s) IV Push <User Schedule>  famotidine    Tablet 10 milliGRAM(s) Oral daily  folic acid 1 milliGRAM(s) Oral daily  glucagon  Injectable 1 milliGRAM(s) IntraMuscular once  heparin   Injectable 5000 Unit(s) SubCutaneous every 8 hours  hydrocortisone 1% Cream 1 Application(s) Topical daily  insulin glargine Injectable (LANTUS) 18 Unit(s) SubCutaneous at bedtime  insulin lispro (ADMELOG) corrective regimen sliding scale   SubCutaneous at bedtime  insulin lispro (ADMELOG) corrective regimen sliding scale   SubCutaneous three times a day with meals  lidocaine/prilocaine Cream 1 Application(s) Topical <User Schedule> PRN  melatonin 3 milliGRAM(s) Oral at bedtime PRN  polyethylene glycol 3350 17 Gram(s) Oral two times a day  pregabalin 25 milliGRAM(s) Oral at bedtime  senna 2 Tablet(s) Oral at bedtime  sevelamer carbonate 800 milliGRAM(s) Oral three times a day with meals  tamsulosin 0.4 milliGRAM(s) Oral at bedtime  zinc oxide 40% Paste 1 Application(s) Topical daily    A/P:    DM, HTN, CM  S/p SDH  ESRD on HD TTS  Episodes of low BP  Would follow off BP lowering meds  Fluid removal w/HD 1kg today  Bld work w/HD  Epoetin w/HD  Will f/u BP    [phone]
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
## 2023-01-19 NOTE — PROGRESS NOTE ADULT - ASSESSMENT
Pt alert, attentive, intact language, thought processes goal-directed, no abnormal thought contents noted, depressed affect, euthymic mood, denied AH/VH, denied SI/HI/I/P, calm behavior, improved attention and coping. Plan: Continue supportive tx.

## 2023-01-20 LAB
GLUCOSE BLDC GLUCOMTR-MCNC: 114 MG/DL — HIGH (ref 70–99)
GLUCOSE BLDC GLUCOMTR-MCNC: 168 MG/DL — HIGH (ref 70–99)
GLUCOSE BLDC GLUCOMTR-MCNC: 216 MG/DL — HIGH (ref 70–99)
GLUCOSE BLDC GLUCOMTR-MCNC: 318 MG/DL — HIGH (ref 70–99)

## 2023-01-20 PROCEDURE — 99232 SBSQ HOSP IP/OBS MODERATE 35: CPT

## 2023-01-20 RX ADMIN — CHLORHEXIDINE GLUCONATE 1 APPLICATION(S): 213 SOLUTION TOPICAL at 11:22

## 2023-01-20 RX ADMIN — HEPARIN SODIUM 5000 UNIT(S): 5000 INJECTION INTRAVENOUS; SUBCUTANEOUS at 05:21

## 2023-01-20 RX ADMIN — SEVELAMER CARBONATE 800 MILLIGRAM(S): 2400 POWDER, FOR SUSPENSION ORAL at 12:08

## 2023-01-20 RX ADMIN — SEVELAMER CARBONATE 800 MILLIGRAM(S): 2400 POWDER, FOR SUSPENSION ORAL at 17:01

## 2023-01-20 RX ADMIN — Medication 650 MILLIGRAM(S): at 05:29

## 2023-01-20 RX ADMIN — Medication 50 MILLIGRAM(S): at 21:41

## 2023-01-20 RX ADMIN — Medication 100 MILLIGRAM(S): at 08:40

## 2023-01-20 RX ADMIN — Medication 1 MILLIGRAM(S): at 11:22

## 2023-01-20 RX ADMIN — Medication 650 MILLIGRAM(S): at 13:01

## 2023-01-20 RX ADMIN — ATORVASTATIN CALCIUM 80 MILLIGRAM(S): 80 TABLET, FILM COATED ORAL at 21:40

## 2023-01-20 RX ADMIN — ZINC OXIDE 1 APPLICATION(S): 200 OINTMENT TOPICAL at 11:21

## 2023-01-20 RX ADMIN — Medication 650 MILLIGRAM(S): at 14:04

## 2023-01-20 RX ADMIN — SEVELAMER CARBONATE 800 MILLIGRAM(S): 2400 POWDER, FOR SUSPENSION ORAL at 08:40

## 2023-01-20 RX ADMIN — Medication 1 APPLICATION(S): at 11:21

## 2023-01-20 RX ADMIN — Medication 81 MILLIGRAM(S): at 11:23

## 2023-01-20 RX ADMIN — CLOPIDOGREL BISULFATE 75 MILLIGRAM(S): 75 TABLET, FILM COATED ORAL at 11:22

## 2023-01-20 RX ADMIN — Medication 10: at 12:05

## 2023-01-20 RX ADMIN — FAMOTIDINE 10 MILLIGRAM(S): 10 INJECTION INTRAVENOUS at 11:23

## 2023-01-20 RX ADMIN — TAMSULOSIN HYDROCHLORIDE 0.4 MILLIGRAM(S): 0.4 CAPSULE ORAL at 21:40

## 2023-01-20 RX ADMIN — Medication 650 MILLIGRAM(S): at 22:30

## 2023-01-20 RX ADMIN — HEPARIN SODIUM 5000 UNIT(S): 5000 INJECTION INTRAVENOUS; SUBCUTANEOUS at 21:41

## 2023-01-20 RX ADMIN — POLYETHYLENE GLYCOL 3350 17 GRAM(S): 17 POWDER, FOR SOLUTION ORAL at 17:12

## 2023-01-20 RX ADMIN — HEPARIN SODIUM 5000 UNIT(S): 5000 INJECTION INTRAVENOUS; SUBCUTANEOUS at 14:59

## 2023-01-20 RX ADMIN — INSULIN GLARGINE 18 UNIT(S): 100 INJECTION, SOLUTION SUBCUTANEOUS at 21:40

## 2023-01-20 RX ADMIN — Medication 4: at 17:00

## 2023-01-20 RX ADMIN — Medication 650 MILLIGRAM(S): at 21:40
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
## 2023-01-20 NOTE — PROGRESS NOTE ADULT - SUBJECTIVE AND OBJECTIVE BOX
No distress    Vital Signs Last 24 Hrs  T(C): 36.9 (01-19-23 @ 19:44), Max: 36.9 (01-19-23 @ 19:44)  T(F): 98.5 (01-19-23 @ 19:44), Max: 98.5 (01-19-23 @ 19:44)  HR: 101 (01-19-23 @ 19:44) (101 - 101)  BP: 151/86 (01-19-23 @ 19:44) (151/86 - 151/86)  RR: 15 (01-19-23 @ 19:44) (15 - 15)  SpO2: 98% (01-19-23 @ 19:44) (98% - 98%)    I&O's Detail    19 Jan 2023 07:01  -  20 Jan 2023 07:00  --------------------------------------------------------  IN:    Other (mL): 800 mL  Total IN: 800 mL    OUT:    Other (mL): 1800 mL  Total OUT: 1800 mL    Total NET: -1000 mL    s1s2  b/l air entry  soft, ND  no edema                                                               7.9    8.25  )-----------( 257      ( 19 Jan 2023 14:30 )             24.6     19 Jan 2023 14:30    134    |  97     |  84     ----------------------------<  169    4.4     |  26     |  4.37     Ca    8.8        19 Jan 2023 14:30    TPro  6.5    /  Alb  2.6    /  TBili  0.2    /  DBili  x      /  AST  13     /  ALT  14     /  AlkPhos  88     19 Jan 2023 14:30    LIVER FUNCTIONS - ( 19 Jan 2023 14:30 )  Alb: 2.6 g/dL / Pro: 6.5 g/dL / ALK PHOS: 88 U/L / ALT: 14 U/L / AST: 13 U/L / GGT: x           acetaminophen     Tablet .. 650 milliGRAM(s) Oral every 6 hours PRN  acetaminophen     Tablet .. 650 milliGRAM(s) Oral <User Schedule>  allopurinol 100 milliGRAM(s) Oral <User Schedule>  aspirin  chewable 81 milliGRAM(s) Oral daily  atorvastatin 80 milliGRAM(s) Oral at bedtime  chlorhexidine 2% Cloths 1 Application(s) Topical daily  clopidogrel Tablet 75 milliGRAM(s) Oral daily  dextrose 5%. 1000 milliLiter(s) IV Continuous <Continuous>  dextrose 5%. 1000 milliLiter(s) IV Continuous <Continuous>  dextrose 50% Injectable 25 Gram(s) IV Push once  dextrose 50% Injectable 12.5 Gram(s) IV Push once  dextrose 50% Injectable 25 Gram(s) IV Push once  dextrose Oral Gel 15 Gram(s) Oral once PRN  epoetin wayne-epbx (RETACRIT) Injectable 96562 Unit(s) IV Push <User Schedule>  famotidine    Tablet 10 milliGRAM(s) Oral daily  folic acid 1 milliGRAM(s) Oral daily  glucagon  Injectable 1 milliGRAM(s) IntraMuscular once  heparin   Injectable 5000 Unit(s) SubCutaneous every 8 hours  hydrocortisone 1% Cream 1 Application(s) Topical daily  insulin glargine Injectable (LANTUS) 18 Unit(s) SubCutaneous at bedtime  insulin lispro (ADMELOG) corrective regimen sliding scale   SubCutaneous at bedtime  insulin lispro (ADMELOG) corrective regimen sliding scale   SubCutaneous three times a day with meals  lidocaine/prilocaine Cream 1 Application(s) Topical <User Schedule> PRN  melatonin 3 milliGRAM(s) Oral at bedtime PRN  polyethylene glycol 3350 17 Gram(s) Oral two times a day  pregabalin 50 milliGRAM(s) Oral at bedtime  senna 2 Tablet(s) Oral at bedtime  sevelamer carbonate 800 milliGRAM(s) Oral three times a day with meals  tamsulosin 0.4 milliGRAM(s) Oral at bedtime  zinc oxide 40% Paste 1 Application(s) Topical daily    A/P:    DM, HTN, CM  S/p SDH  ESRD on HD TTS  Fluid removal as able w/HD  Bld work, Epoetin w/HD  Will f/u BP    [phone]
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
## 2023-01-21 LAB
ANION GAP SERPL CALC-SCNC: 8 MMOL/L — SIGNIFICANT CHANGE UP (ref 5–17)
BUN SERPL-MCNC: 60 MG/DL — HIGH (ref 7–23)
CALCIUM SERPL-MCNC: 9.1 MG/DL — SIGNIFICANT CHANGE UP (ref 8.4–10.5)
CHLORIDE SERPL-SCNC: 101 MMOL/L — SIGNIFICANT CHANGE UP (ref 96–108)
CO2 SERPL-SCNC: 28 MMOL/L — SIGNIFICANT CHANGE UP (ref 22–31)
CREAT SERPL-MCNC: 3.86 MG/DL — HIGH (ref 0.5–1.3)
EGFR: 17 ML/MIN/1.73M2 — LOW
GLUCOSE BLDC GLUCOMTR-MCNC: 109 MG/DL — HIGH (ref 70–99)
GLUCOSE BLDC GLUCOMTR-MCNC: 123 MG/DL — HIGH (ref 70–99)
GLUCOSE BLDC GLUCOMTR-MCNC: 176 MG/DL — HIGH (ref 70–99)
GLUCOSE BLDC GLUCOMTR-MCNC: 219 MG/DL — HIGH (ref 70–99)
GLUCOSE SERPL-MCNC: 198 MG/DL — HIGH (ref 70–99)
HCT VFR BLD CALC: 25.6 % — LOW (ref 39–50)
HGB BLD-MCNC: 8.1 G/DL — LOW (ref 13–17)
MCHC RBC-ENTMCNC: 27.6 PG — SIGNIFICANT CHANGE UP (ref 27–34)
MCHC RBC-ENTMCNC: 31.6 GM/DL — LOW (ref 32–36)
MCV RBC AUTO: 87.1 FL — SIGNIFICANT CHANGE UP (ref 80–100)
NRBC # BLD: 0 /100 WBCS — SIGNIFICANT CHANGE UP (ref 0–0)
PHOSPHATE SERPL-MCNC: 3.8 MG/DL — SIGNIFICANT CHANGE UP (ref 2.5–4.5)
PLATELET # BLD AUTO: 310 K/UL — SIGNIFICANT CHANGE UP (ref 150–400)
POTASSIUM SERPL-MCNC: 4.3 MMOL/L — SIGNIFICANT CHANGE UP (ref 3.5–5.3)
POTASSIUM SERPL-SCNC: 4.3 MMOL/L — SIGNIFICANT CHANGE UP (ref 3.5–5.3)
RBC # BLD: 2.94 M/UL — LOW (ref 4.2–5.8)
RBC # FLD: 16 % — HIGH (ref 10.3–14.5)
SODIUM SERPL-SCNC: 137 MMOL/L — SIGNIFICANT CHANGE UP (ref 135–145)
WBC # BLD: 8.56 K/UL — SIGNIFICANT CHANGE UP (ref 3.8–10.5)
WBC # FLD AUTO: 8.56 K/UL — SIGNIFICANT CHANGE UP (ref 3.8–10.5)

## 2023-01-21 PROCEDURE — 99232 SBSQ HOSP IP/OBS MODERATE 35: CPT

## 2023-01-21 RX ORDER — ACETAMINOPHEN 500 MG
975 TABLET ORAL
Refills: 0 | Status: DISCONTINUED | OUTPATIENT
Start: 2023-01-21 | End: 2023-01-27

## 2023-01-21 RX ORDER — LANOLIN ALCOHOL/MO/W.PET/CERES
3 CREAM (GRAM) TOPICAL AT BEDTIME
Refills: 0 | Status: DISCONTINUED | OUTPATIENT
Start: 2023-01-21 | End: 2023-01-27

## 2023-01-21 RX ADMIN — HEPARIN SODIUM 5000 UNIT(S): 5000 INJECTION INTRAVENOUS; SUBCUTANEOUS at 13:05

## 2023-01-21 RX ADMIN — ERYTHROPOIETIN 10000 UNIT(S): 10000 INJECTION, SOLUTION INTRAVENOUS; SUBCUTANEOUS at 13:59

## 2023-01-21 RX ADMIN — Medication 4: at 17:04

## 2023-01-21 RX ADMIN — Medication 975 MILLIGRAM(S): at 22:26

## 2023-01-21 RX ADMIN — ATORVASTATIN CALCIUM 80 MILLIGRAM(S): 80 TABLET, FILM COATED ORAL at 21:29

## 2023-01-21 RX ADMIN — FAMOTIDINE 10 MILLIGRAM(S): 10 INJECTION INTRAVENOUS at 12:22

## 2023-01-21 RX ADMIN — LIDOCAINE AND PRILOCAINE CREAM 1 APPLICATION(S): 25; 25 CREAM TOPICAL at 12:23

## 2023-01-21 RX ADMIN — Medication 1 MILLIGRAM(S): at 12:21

## 2023-01-21 RX ADMIN — HEPARIN SODIUM 5000 UNIT(S): 5000 INJECTION INTRAVENOUS; SUBCUTANEOUS at 21:30

## 2023-01-21 RX ADMIN — HEPARIN SODIUM 5000 UNIT(S): 5000 INJECTION INTRAVENOUS; SUBCUTANEOUS at 06:42

## 2023-01-21 RX ADMIN — Medication 100 MILLIGRAM(S): at 08:55

## 2023-01-21 RX ADMIN — SEVELAMER CARBONATE 800 MILLIGRAM(S): 2400 POWDER, FOR SUSPENSION ORAL at 17:04

## 2023-01-21 RX ADMIN — CLOPIDOGREL BISULFATE 75 MILLIGRAM(S): 75 TABLET, FILM COATED ORAL at 12:21

## 2023-01-21 RX ADMIN — INSULIN GLARGINE 18 UNIT(S): 100 INJECTION, SOLUTION SUBCUTANEOUS at 21:30

## 2023-01-21 RX ADMIN — SEVELAMER CARBONATE 800 MILLIGRAM(S): 2400 POWDER, FOR SUSPENSION ORAL at 12:21

## 2023-01-21 RX ADMIN — ZINC OXIDE 1 APPLICATION(S): 200 OINTMENT TOPICAL at 12:22

## 2023-01-21 RX ADMIN — Medication 650 MILLIGRAM(S): at 13:59

## 2023-01-21 RX ADMIN — Medication 650 MILLIGRAM(S): at 08:48

## 2023-01-21 RX ADMIN — Medication 650 MILLIGRAM(S): at 07:49

## 2023-01-21 RX ADMIN — TAMSULOSIN HYDROCHLORIDE 0.4 MILLIGRAM(S): 0.4 CAPSULE ORAL at 21:30

## 2023-01-21 RX ADMIN — Medication 1 APPLICATION(S): at 12:22

## 2023-01-21 RX ADMIN — Medication 81 MILLIGRAM(S): at 12:21

## 2023-01-21 RX ADMIN — CHLORHEXIDINE GLUCONATE 1 APPLICATION(S): 213 SOLUTION TOPICAL at 12:23

## 2023-01-21 RX ADMIN — Medication 50 MILLIGRAM(S): at 21:28

## 2023-01-21 RX ADMIN — Medication 975 MILLIGRAM(S): at 21:29

## 2023-01-21 RX ADMIN — Medication 3 MILLIGRAM(S): at 21:29

## 2023-01-21 RX ADMIN — SEVELAMER CARBONATE 800 MILLIGRAM(S): 2400 POWDER, FOR SUSPENSION ORAL at 07:50
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
## 2023-01-21 NOTE — PROGRESS NOTE ADULT - SUBJECTIVE AND OBJECTIVE BOX
[facility] NEPHROLOGY SERVICES, [facility]  NEPHROLOGY AND HYPERTENSION  [address]  [address]  [place] [address]  [phone]    MD FRANCIA SANTOS MD ANDREY GONCHARUK, MD MADHU KORRAPATI, MD YELENA ROSENBERG, MD BINNY KOSHY, MD CHRISTOPHER CAPUTO, MD EDWARD BOVER, MD          Patient events noted    MEDICATIONS  (STANDING):  acetaminophen     Tablet .. 975 milliGRAM(s) Oral <User Schedule>  allopurinol 100 milliGRAM(s) Oral <User Schedule>  aspirin  chewable 81 milliGRAM(s) Oral daily  atorvastatin 80 milliGRAM(s) Oral at bedtime  chlorhexidine 2% Cloths 1 Application(s) Topical daily  clopidogrel Tablet 75 milliGRAM(s) Oral daily  dextrose 5%. 1000 milliLiter(s) (100 mL/Hr) IV Continuous <Continuous>  dextrose 5%. 1000 milliLiter(s) (50 mL/Hr) IV Continuous <Continuous>  dextrose 50% Injectable 25 Gram(s) IV Push once  dextrose 50% Injectable 12.5 Gram(s) IV Push once  dextrose 50% Injectable 25 Gram(s) IV Push once  epoetin wayne-epbx (RETACRIT) Injectable 42110 Unit(s) IV Push <User Schedule>  famotidine    Tablet 10 milliGRAM(s) Oral daily  folic acid 1 milliGRAM(s) Oral daily  glucagon  Injectable 1 milliGRAM(s) IntraMuscular once  heparin   Injectable 5000 Unit(s) SubCutaneous every 8 hours  hydrocortisone 1% Cream 1 Application(s) Topical daily  insulin glargine Injectable (LANTUS) 18 Unit(s) SubCutaneous at bedtime  insulin lispro (ADMELOG) corrective regimen sliding scale   SubCutaneous at bedtime  insulin lispro (ADMELOG) corrective regimen sliding scale   SubCutaneous three times a day with meals  melatonin 3 milliGRAM(s) Oral at bedtime  polyethylene glycol 3350 17 Gram(s) Oral two times a day  pregabalin 50 milliGRAM(s) Oral at bedtime  senna 2 Tablet(s) Oral at bedtime  sevelamer carbonate 800 milliGRAM(s) Oral three times a day with meals  tamsulosin 0.4 milliGRAM(s) Oral at bedtime  zinc oxide 40% Paste 1 Application(s) Topical daily    MEDICATIONS  (PRN):  acetaminophen     Tablet .. 650 milliGRAM(s) Oral every 6 hours PRN Temp greater or equal to 38C (100.4F), Mild Pain (1 - 3)  dextrose Oral Gel 15 Gram(s) Oral once PRN Blood Glucose LESS THAN 70 milliGRAM(s)/deciliter  lidocaine/prilocaine Cream 1 Application(s) Topical <User Schedule> PRN on dialysis days      01-20-23 @ 07:01  -  01-21-23 @ 07:00  --------------------------------------------------------  IN: 0 mL / OUT: 300 mL / NET: -300 mL    01-21-23 @ 07:01  -  01-21-23 @ 21:00  --------------------------------------------------------  IN: 0 mL / OUT: 900 mL / NET: -900 mL      PHYSICAL EXAM:      T(C): 36.4 (01-21-23 @ 16:09), Max: 36.9 (01-21-23 @ 13:15)  HR: 116 (01-21-23 @ 16:09) (97 - 116)  BP: 125/69 (01-21-23 @ 16:09) (125/69 - 160/82)  RR: 20 (01-21-23 @ 16:09) (16 - 20)  SpO2: 99% (01-21-23 @ 16:09) (98% - 100%)  Wt(kg): --  Lungs clear  Heart S1S2  Abd soft NT ND  Extremities:   tr edema                                    8.1    8.56  )-----------( 310      ( 21 Jan 2023 14:15 )             25.6     01-21    137  |  101  |  60<H>  ----------------------------<  198<H>  4.3   |  28  |  3.86<H>    Ca    9.1      21 Jan 2023 14:15  Phos  3.8     01-21          Creatinine Trend: 3.86<--, 4.37<--, 5.37<--, 4.61<--, 5.59<--, 5.09<--      A/P:    DM, HTN, CM  S/p SDH  ESRD on HD TTS  Fluid removal as able w/HD  Bld work, Epoetin w/HD  Will f/u BP      German Forbes MD
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
## 2023-01-22 LAB
GLUCOSE BLDC GLUCOMTR-MCNC: 137 MG/DL — HIGH (ref 70–99)
GLUCOSE BLDC GLUCOMTR-MCNC: 176 MG/DL — HIGH (ref 70–99)
GLUCOSE BLDC GLUCOMTR-MCNC: 184 MG/DL — HIGH (ref 70–99)
GLUCOSE BLDC GLUCOMTR-MCNC: 218 MG/DL — HIGH (ref 70–99)

## 2023-01-22 PROCEDURE — 99232 SBSQ HOSP IP/OBS MODERATE 35: CPT

## 2023-01-22 RX ADMIN — TAMSULOSIN HYDROCHLORIDE 0.4 MILLIGRAM(S): 0.4 CAPSULE ORAL at 21:44

## 2023-01-22 RX ADMIN — Medication 1 MILLIGRAM(S): at 11:58

## 2023-01-22 RX ADMIN — SEVELAMER CARBONATE 800 MILLIGRAM(S): 2400 POWDER, FOR SUSPENSION ORAL at 11:57

## 2023-01-22 RX ADMIN — CHLORHEXIDINE GLUCONATE 1 APPLICATION(S): 213 SOLUTION TOPICAL at 11:55

## 2023-01-22 RX ADMIN — Medication 4: at 12:02

## 2023-01-22 RX ADMIN — HEPARIN SODIUM 5000 UNIT(S): 5000 INJECTION INTRAVENOUS; SUBCUTANEOUS at 06:55

## 2023-01-22 RX ADMIN — SEVELAMER CARBONATE 800 MILLIGRAM(S): 2400 POWDER, FOR SUSPENSION ORAL at 16:40

## 2023-01-22 RX ADMIN — FAMOTIDINE 10 MILLIGRAM(S): 10 INJECTION INTRAVENOUS at 11:57

## 2023-01-22 RX ADMIN — INSULIN GLARGINE 18 UNIT(S): 100 INJECTION, SOLUTION SUBCUTANEOUS at 21:45

## 2023-01-22 RX ADMIN — CLOPIDOGREL BISULFATE 75 MILLIGRAM(S): 75 TABLET, FILM COATED ORAL at 11:58

## 2023-01-22 RX ADMIN — Medication 81 MILLIGRAM(S): at 11:57

## 2023-01-22 RX ADMIN — Medication 975 MILLIGRAM(S): at 22:30

## 2023-01-22 RX ADMIN — SEVELAMER CARBONATE 800 MILLIGRAM(S): 2400 POWDER, FOR SUSPENSION ORAL at 08:22

## 2023-01-22 RX ADMIN — Medication 75 MILLIGRAM(S): at 21:45

## 2023-01-22 RX ADMIN — Medication 3 MILLIGRAM(S): at 21:45

## 2023-01-22 RX ADMIN — ZINC OXIDE 1 APPLICATION(S): 200 OINTMENT TOPICAL at 11:54

## 2023-01-22 RX ADMIN — Medication 975 MILLIGRAM(S): at 21:44

## 2023-01-22 RX ADMIN — ATORVASTATIN CALCIUM 80 MILLIGRAM(S): 80 TABLET, FILM COATED ORAL at 21:44

## 2023-01-22 RX ADMIN — HEPARIN SODIUM 5000 UNIT(S): 5000 INJECTION INTRAVENOUS; SUBCUTANEOUS at 21:46

## 2023-01-22 RX ADMIN — Medication 100 MILLIGRAM(S): at 08:22

## 2023-01-22 RX ADMIN — Medication 1 APPLICATION(S): at 11:54

## 2023-01-22 RX ADMIN — Medication 6: at 16:39

## 2023-01-22 RX ADMIN — HEPARIN SODIUM 5000 UNIT(S): 5000 INJECTION INTRAVENOUS; SUBCUTANEOUS at 13:27
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
## 2023-01-23 ENCOUNTER — TRANSCRIPTION ENCOUNTER (OUTPATIENT)
Age: 63
End: 2023-01-23

## 2023-01-23 LAB
GLUCOSE BLDC GLUCOMTR-MCNC: 134 MG/DL — HIGH (ref 70–99)
GLUCOSE BLDC GLUCOMTR-MCNC: 162 MG/DL — HIGH (ref 70–99)
GLUCOSE BLDC GLUCOMTR-MCNC: 217 MG/DL — HIGH (ref 70–99)
GLUCOSE BLDC GLUCOMTR-MCNC: 92 MG/DL — SIGNIFICANT CHANGE UP (ref 70–99)
HBV CORE AB SER-ACNC: SIGNIFICANT CHANGE UP
HBV SURFACE AB SER-ACNC: <3 MIU/ML — LOW
HBV SURFACE AG SER-ACNC: SIGNIFICANT CHANGE UP
HCV AB S/CO SERPL IA: 0.09 S/CO — SIGNIFICANT CHANGE UP (ref 0–0.99)
HCV AB SERPL-IMP: SIGNIFICANT CHANGE UP

## 2023-01-23 PROCEDURE — 99233 SBSQ HOSP IP/OBS HIGH 50: CPT

## 2023-01-23 PROCEDURE — 99232 SBSQ HOSP IP/OBS MODERATE 35: CPT

## 2023-01-23 RX ORDER — LIDOCAINE 4 G/100G
1 CREAM TOPICAL
Refills: 0 | Status: DISCONTINUED | OUTPATIENT
Start: 2023-01-23 | End: 2023-01-27

## 2023-01-23 RX ORDER — METOPROLOL TARTRATE 50 MG
12.5 TABLET ORAL EVERY 24 HOURS
Refills: 0 | Status: DISCONTINUED | OUTPATIENT
Start: 2023-01-23 | End: 2023-01-23

## 2023-01-23 RX ORDER — METOPROLOL TARTRATE 50 MG
12.5 TABLET ORAL AT BEDTIME
Refills: 0 | Status: DISCONTINUED | OUTPATIENT
Start: 2023-01-23 | End: 2023-01-27

## 2023-01-23 RX ADMIN — FAMOTIDINE 10 MILLIGRAM(S): 10 INJECTION INTRAVENOUS at 12:10

## 2023-01-23 RX ADMIN — HEPARIN SODIUM 5000 UNIT(S): 5000 INJECTION INTRAVENOUS; SUBCUTANEOUS at 14:24

## 2023-01-23 RX ADMIN — HEPARIN SODIUM 5000 UNIT(S): 5000 INJECTION INTRAVENOUS; SUBCUTANEOUS at 06:36

## 2023-01-23 RX ADMIN — Medication 975 MILLIGRAM(S): at 22:05

## 2023-01-23 RX ADMIN — ATORVASTATIN CALCIUM 80 MILLIGRAM(S): 80 TABLET, FILM COATED ORAL at 21:16

## 2023-01-23 RX ADMIN — Medication 81 MILLIGRAM(S): at 12:09

## 2023-01-23 RX ADMIN — CLOPIDOGREL BISULFATE 75 MILLIGRAM(S): 75 TABLET, FILM COATED ORAL at 12:10

## 2023-01-23 RX ADMIN — INSULIN GLARGINE 18 UNIT(S): 100 INJECTION, SOLUTION SUBCUTANEOUS at 21:17

## 2023-01-23 RX ADMIN — Medication 1 APPLICATION(S): at 12:09

## 2023-01-23 RX ADMIN — Medication 12.5 MILLIGRAM(S): at 21:15

## 2023-01-23 RX ADMIN — Medication 1 MILLIGRAM(S): at 12:10

## 2023-01-23 RX ADMIN — Medication 75 MILLIGRAM(S): at 21:18

## 2023-01-23 RX ADMIN — Medication 4: at 16:30

## 2023-01-23 RX ADMIN — SEVELAMER CARBONATE 800 MILLIGRAM(S): 2400 POWDER, FOR SUSPENSION ORAL at 12:11

## 2023-01-23 RX ADMIN — Medication 975 MILLIGRAM(S): at 21:17

## 2023-01-23 RX ADMIN — ZINC OXIDE 1 APPLICATION(S): 200 OINTMENT TOPICAL at 12:08

## 2023-01-23 RX ADMIN — Medication 3 MILLIGRAM(S): at 21:18

## 2023-01-23 RX ADMIN — Medication 100 MILLIGRAM(S): at 08:46

## 2023-01-23 RX ADMIN — CHLORHEXIDINE GLUCONATE 1 APPLICATION(S): 213 SOLUTION TOPICAL at 12:12

## 2023-01-23 RX ADMIN — SEVELAMER CARBONATE 800 MILLIGRAM(S): 2400 POWDER, FOR SUSPENSION ORAL at 08:46

## 2023-01-23 RX ADMIN — Medication 6: at 12:07

## 2023-01-23 RX ADMIN — TAMSULOSIN HYDROCHLORIDE 0.4 MILLIGRAM(S): 0.4 CAPSULE ORAL at 21:16

## 2023-01-23 RX ADMIN — SEVELAMER CARBONATE 800 MILLIGRAM(S): 2400 POWDER, FOR SUSPENSION ORAL at 16:30

## 2023-01-23 RX ADMIN — HEPARIN SODIUM 5000 UNIT(S): 5000 INJECTION INTRAVENOUS; SUBCUTANEOUS at 21:17
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
## 2023-01-23 NOTE — PROGRESS NOTE ADULT - SUBJECTIVE AND OBJECTIVE BOX
No distress    Vital Signs Last 24 Hrs  T(C): 36.7 (01-23-23 @ 08:00), Max: 36.9 (01-22-23 @ 21:40)  T(F): 98 (01-23-23 @ 08:00), Max: 98.4 (01-22-23 @ 21:40)  HR: 113 (01-23-23 @ 10:00) (88 - 113)  BP: 144/95 (01-23-23 @ 10:00) (120/80 - 152/88)  RR: 16 (01-23-23 @ 10:00) (16 - 17)  SpO2: 100% (01-23-23 @ 10:00) (96% - 100%)    s1s2  b/l air entry  soft, ND  no edema                                           acetaminophen     Tablet .. 650 milliGRAM(s) Oral every 6 hours PRN  acetaminophen     Tablet .. 975 milliGRAM(s) Oral <User Schedule>  allopurinol 100 milliGRAM(s) Oral <User Schedule>  aspirin  chewable 81 milliGRAM(s) Oral daily  atorvastatin 80 milliGRAM(s) Oral at bedtime  chlorhexidine 2% Cloths 1 Application(s) Topical daily  clopidogrel Tablet 75 milliGRAM(s) Oral daily  dextrose 5%. 1000 milliLiter(s) IV Continuous <Continuous>  dextrose 5%. 1000 milliLiter(s) IV Continuous <Continuous>  dextrose 50% Injectable 25 Gram(s) IV Push once  dextrose 50% Injectable 12.5 Gram(s) IV Push once  dextrose 50% Injectable 25 Gram(s) IV Push once  dextrose Oral Gel 15 Gram(s) Oral once PRN  epoetin wayne-epbx (RETACRIT) Injectable 17799 Unit(s) IV Push <User Schedule>  famotidine    Tablet 10 milliGRAM(s) Oral daily  folic acid 1 milliGRAM(s) Oral daily  glucagon  Injectable 1 milliGRAM(s) IntraMuscular once  heparin   Injectable 5000 Unit(s) SubCutaneous every 8 hours  hydrocortisone 1% Cream 1 Application(s) Topical daily  insulin glargine Injectable (LANTUS) 18 Unit(s) SubCutaneous at bedtime  insulin lispro (ADMELOG) corrective regimen sliding scale   SubCutaneous at bedtime  insulin lispro (ADMELOG) corrective regimen sliding scale   SubCutaneous three times a day with meals  lidocaine   4% Patch 1 Patch Transdermal <User Schedule>  lidocaine/prilocaine Cream 1 Application(s) Topical <User Schedule> PRN  melatonin 3 milliGRAM(s) Oral at bedtime  metoprolol succinate ER 12.5 milliGRAM(s) Oral at bedtime  polyethylene glycol 3350 17 Gram(s) Oral two times a day  pregabalin 75 milliGRAM(s) Oral at bedtime  senna 2 Tablet(s) Oral at bedtime  sevelamer carbonate 800 milliGRAM(s) Oral three times a day with meals  tamsulosin 0.4 milliGRAM(s) Oral at bedtime  zinc oxide 40% Paste 1 Application(s) Topical daily    A/P:    DM, HTN, CM  S/p SDH  ESRD on HD TTS  Fluid removal as able w/HD  Bld work, Epoetin w/HD  Rehab    [phone]

## 2023-01-23 NOTE — DISCHARGE NOTE NURSING/CASE MANAGEMENT/SOCIAL WORK - NSDCPEPT PROEDMA_GEN_ALL_CORE
Noted at [facility] that patient is COVID positive and has been admitted to the hospital.  Will reset  INR reminder to 1/12/22 and check patient status at that time.      Next appointment with A[facility] is currently scheduled for 1/18/22.       Yes
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
## 2023-01-23 NOTE — DISCHARGE NOTE NURSING/CASE MANAGEMENT/SOCIAL WORK - PATIENT PORTAL LINK FT
You can access the FollowMyHealth Patient Portal offered by [facility] by registering at the following website: [email or web address]. By joining [employer]’s FollowMyHealth portal, you will also be able to view your health information using other applications (apps) compatible with our system.

## 2023-01-23 NOTE — DISCHARGE NOTE NURSING/CASE MANAGEMENT/SOCIAL WORK - NSDCVIVACCINE_GEN_ALL_CORE_FT
COVID-19, mRNA, LNP-S, PF, 30 mcg/0.3 mL dose (Pfizer); 09-Dec-2021 11:24; Sobeida Kang (RN); [employer]; [id number] (Exp. Date: 31-Dec-2021); IntraMuscular; Deltoid Left.; 0.3 milliLiter(s);   influenza, injectable, quadrivalent, preservative free; 09-Dec-2021 11:25; Sobeida Kang (SELVIN); [employer]; [id number] (Exp. Date: 30-Jun-2022); IntraMuscular; Deltoid Right.; 0.5 milliLiter(s); VIS (VIS Published: 06-Aug-2021, VIS Presented: 09-Dec-2021);

## 2023-01-24 LAB
ALBUMIN SERPL ELPH-MCNC: 2.8 G/DL — LOW (ref 3.3–5)
ALP SERPL-CCNC: 80 U/L — SIGNIFICANT CHANGE UP (ref 40–120)
ALT FLD-CCNC: 10 U/L — SIGNIFICANT CHANGE UP (ref 10–45)
ANION GAP SERPL CALC-SCNC: 15 MMOL/L — SIGNIFICANT CHANGE UP (ref 5–17)
AST SERPL-CCNC: 14 U/L — SIGNIFICANT CHANGE UP (ref 10–40)
BILIRUB SERPL-MCNC: 0.3 MG/DL — SIGNIFICANT CHANGE UP (ref 0.2–1.2)
BUN SERPL-MCNC: 61 MG/DL — HIGH (ref 7–23)
CALCIUM SERPL-MCNC: 8.9 MG/DL — SIGNIFICANT CHANGE UP (ref 8.4–10.5)
CHLORIDE SERPL-SCNC: 98 MMOL/L — SIGNIFICANT CHANGE UP (ref 96–108)
CO2 SERPL-SCNC: 22 MMOL/L — SIGNIFICANT CHANGE UP (ref 22–31)
CREAT SERPL-MCNC: 4.52 MG/DL — HIGH (ref 0.5–1.3)
EGFR: 14 ML/MIN/1.73M2 — LOW
GLUCOSE BLDC GLUCOMTR-MCNC: 184 MG/DL — HIGH (ref 70–99)
GLUCOSE BLDC GLUCOMTR-MCNC: 237 MG/DL — HIGH (ref 70–99)
GLUCOSE BLDC GLUCOMTR-MCNC: 89 MG/DL — SIGNIFICANT CHANGE UP (ref 70–99)
GLUCOSE BLDC GLUCOMTR-MCNC: 99 MG/DL — SIGNIFICANT CHANGE UP (ref 70–99)
GLUCOSE SERPL-MCNC: 196 MG/DL — HIGH (ref 70–99)
HCT VFR BLD CALC: 25.2 % — LOW (ref 39–50)
HCT VFR BLD CALC: 26.5 % — LOW (ref 39–50)
HGB BLD-MCNC: 7.7 G/DL — LOW (ref 13–17)
HGB BLD-MCNC: 8.5 G/DL — LOW (ref 13–17)
MCHC RBC-ENTMCNC: 27.3 PG — SIGNIFICANT CHANGE UP (ref 27–34)
MCHC RBC-ENTMCNC: 27.6 PG — SIGNIFICANT CHANGE UP (ref 27–34)
MCHC RBC-ENTMCNC: 30.6 GM/DL — LOW (ref 32–36)
MCHC RBC-ENTMCNC: 32.1 GM/DL — SIGNIFICANT CHANGE UP (ref 32–36)
MCV RBC AUTO: 86 FL — SIGNIFICANT CHANGE UP (ref 80–100)
MCV RBC AUTO: 89.4 FL — SIGNIFICANT CHANGE UP (ref 80–100)
NRBC # BLD: 0 /100 WBCS — SIGNIFICANT CHANGE UP (ref 0–0)
NRBC # BLD: 0 /100 WBCS — SIGNIFICANT CHANGE UP (ref 0–0)
PLATELET # BLD AUTO: 278 K/UL — SIGNIFICANT CHANGE UP (ref 150–400)
PLATELET # BLD AUTO: 285 K/UL — SIGNIFICANT CHANGE UP (ref 150–400)
POTASSIUM SERPL-MCNC: 4.6 MMOL/L — SIGNIFICANT CHANGE UP (ref 3.5–5.3)
POTASSIUM SERPL-SCNC: 4.6 MMOL/L — SIGNIFICANT CHANGE UP (ref 3.5–5.3)
PROT SERPL-MCNC: 7 G/DL — SIGNIFICANT CHANGE UP (ref 6–8.3)
RBC # BLD: 2.82 M/UL — LOW (ref 4.2–5.8)
RBC # BLD: 3.08 M/UL — LOW (ref 4.2–5.8)
RBC # FLD: 16.1 % — HIGH (ref 10.3–14.5)
RBC # FLD: 16.2 % — HIGH (ref 10.3–14.5)
SODIUM SERPL-SCNC: 135 MMOL/L — SIGNIFICANT CHANGE UP (ref 135–145)
WBC # BLD: 5.47 K/UL — SIGNIFICANT CHANGE UP (ref 3.8–10.5)
WBC # BLD: 6.61 K/UL — SIGNIFICANT CHANGE UP (ref 3.8–10.5)
WBC # FLD AUTO: 5.47 K/UL — SIGNIFICANT CHANGE UP (ref 3.8–10.5)
WBC # FLD AUTO: 6.61 K/UL — SIGNIFICANT CHANGE UP (ref 3.8–10.5)

## 2023-01-24 PROCEDURE — 99232 SBSQ HOSP IP/OBS MODERATE 35: CPT

## 2023-01-24 RX ORDER — ASCORBIC ACID 60 MG
500 TABLET,CHEWABLE ORAL DAILY
Refills: 0 | Status: DISCONTINUED | OUTPATIENT
Start: 2023-01-24 | End: 2023-01-27

## 2023-01-24 RX ADMIN — Medication 100 MILLIGRAM(S): at 08:17

## 2023-01-24 RX ADMIN — CLOPIDOGREL BISULFATE 75 MILLIGRAM(S): 75 TABLET, FILM COATED ORAL at 12:26

## 2023-01-24 RX ADMIN — FAMOTIDINE 10 MILLIGRAM(S): 10 INJECTION INTRAVENOUS at 12:25

## 2023-01-24 RX ADMIN — ERYTHROPOIETIN 10000 UNIT(S): 10000 INJECTION, SOLUTION INTRAVENOUS; SUBCUTANEOUS at 16:35

## 2023-01-24 RX ADMIN — LIDOCAINE 1 PATCH: 4 CREAM TOPICAL at 08:17

## 2023-01-24 RX ADMIN — Medication 3 MILLIGRAM(S): at 21:08

## 2023-01-24 RX ADMIN — LIDOCAINE 1 PATCH: 4 CREAM TOPICAL at 20:37

## 2023-01-24 RX ADMIN — HEPARIN SODIUM 5000 UNIT(S): 5000 INJECTION INTRAVENOUS; SUBCUTANEOUS at 06:33

## 2023-01-24 RX ADMIN — SEVELAMER CARBONATE 800 MILLIGRAM(S): 2400 POWDER, FOR SUSPENSION ORAL at 12:25

## 2023-01-24 RX ADMIN — ZINC OXIDE 1 APPLICATION(S): 200 OINTMENT TOPICAL at 12:27

## 2023-01-24 RX ADMIN — Medication 6: at 12:26

## 2023-01-24 RX ADMIN — SENNA PLUS 2 TABLET(S): 8.6 TABLET ORAL at 21:09

## 2023-01-24 RX ADMIN — Medication 75 MILLIGRAM(S): at 21:08

## 2023-01-24 RX ADMIN — HEPARIN SODIUM 5000 UNIT(S): 5000 INJECTION INTRAVENOUS; SUBCUTANEOUS at 21:09

## 2023-01-24 RX ADMIN — Medication 975 MILLIGRAM(S): at 22:10

## 2023-01-24 RX ADMIN — INSULIN GLARGINE 18 UNIT(S): 100 INJECTION, SOLUTION SUBCUTANEOUS at 21:08

## 2023-01-24 RX ADMIN — Medication 81 MILLIGRAM(S): at 12:25

## 2023-01-24 RX ADMIN — CHLORHEXIDINE GLUCONATE 1 APPLICATION(S): 213 SOLUTION TOPICAL at 12:24

## 2023-01-24 RX ADMIN — Medication 12.5 MILLIGRAM(S): at 21:08

## 2023-01-24 RX ADMIN — SEVELAMER CARBONATE 800 MILLIGRAM(S): 2400 POWDER, FOR SUSPENSION ORAL at 17:30

## 2023-01-24 RX ADMIN — LIDOCAINE 1 PATCH: 4 CREAM TOPICAL at 19:32

## 2023-01-24 RX ADMIN — ATORVASTATIN CALCIUM 80 MILLIGRAM(S): 80 TABLET, FILM COATED ORAL at 21:09

## 2023-01-24 RX ADMIN — SEVELAMER CARBONATE 800 MILLIGRAM(S): 2400 POWDER, FOR SUSPENSION ORAL at 08:17

## 2023-01-24 RX ADMIN — Medication 975 MILLIGRAM(S): at 21:09

## 2023-01-24 RX ADMIN — Medication 1 APPLICATION(S): at 12:26

## 2023-01-24 RX ADMIN — Medication 1 MILLIGRAM(S): at 12:25

## 2023-01-24 RX ADMIN — TAMSULOSIN HYDROCHLORIDE 0.4 MILLIGRAM(S): 0.4 CAPSULE ORAL at 21:09
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
## 2023-01-24 NOTE — PROGRESS NOTE ADULT - SUBJECTIVE AND OBJECTIVE BOX
Seen on HD    Vital Signs Last 24 Hrs  T(C): 36.7 (01-24-23 @ 17:00), Max: 36.8 (01-24-23 @ 13:54)  T(F): 98 (01-24-23 @ 17:00), Max: 98.3 (01-24-23 @ 13:54)  HR: 103 (01-24-23 @ 17:00) (94 - 108)  BP: 122/74 (01-24-23 @ 17:00) (122/74 - 160/76)  RR: 16 (01-24-23 @ 17:00) (16 - 16)  SpO2: 99% (01-24-23 @ 17:00) (97% - 99%)    I&O's Detail    24 Jan 2023 07:01  -  24 Jan 2023 17:17  --------------------------------------------------------  IN:    Other (mL): 800 mL  Total IN: 800 mL    OUT:    Other (mL): 2300 mL  Total OUT: 2300 mL    Total NET: -1500 mL    s1s2  b/l air entry  soft, ND  no edema                                                               8.5    6.61  )-----------( 285      ( 24 Jan 2023 16:40 )             26.5     24 Jan 2023 14:17    135    |  98     |  61     ----------------------------<  196    4.6     |  22     |  4.52     Ca    8.9        24 Jan 2023 14:17    TPro  7.0    /  Alb  2.8    /  TBili  0.3    /  DBili  x      /  AST  14     /  ALT  10     /  AlkPhos  80     24 Jan 2023 14:17    LIVER FUNCTIONS - ( 24 Jan 2023 14:17 )  Alb: 2.8 g/dL / Pro: 7.0 g/dL / ALK PHOS: 80 U/L / ALT: 10 U/L / AST: 14 U/L / GGT: x           acetaminophen     Tablet .. 650 milliGRAM(s) Oral every 6 hours PRN  acetaminophen     Tablet .. 975 milliGRAM(s) Oral <User Schedule>  allopurinol 100 milliGRAM(s) Oral <User Schedule>  ascorbic acid 500 milliGRAM(s) Oral daily  aspirin  chewable 81 milliGRAM(s) Oral daily  atorvastatin 80 milliGRAM(s) Oral at bedtime  chlorhexidine 2% Cloths 1 Application(s) Topical daily  clopidogrel Tablet 75 milliGRAM(s) Oral daily  dextrose 5%. 1000 milliLiter(s) IV Continuous <Continuous>  dextrose 5%. 1000 milliLiter(s) IV Continuous <Continuous>  dextrose 50% Injectable 25 Gram(s) IV Push once  dextrose 50% Injectable 12.5 Gram(s) IV Push once  dextrose 50% Injectable 25 Gram(s) IV Push once  dextrose Oral Gel 15 Gram(s) Oral once PRN  epoetin wayne-epbx (RETACRIT) Injectable 77746 Unit(s) IV Push <User Schedule>  famotidine    Tablet 10 milliGRAM(s) Oral daily  folic acid 1 milliGRAM(s) Oral daily  glucagon  Injectable 1 milliGRAM(s) IntraMuscular once  heparin   Injectable 5000 Unit(s) SubCutaneous every 8 hours  hydrocortisone 1% Cream 1 Application(s) Topical daily  insulin glargine Injectable (LANTUS) 18 Unit(s) SubCutaneous at bedtime  insulin lispro (ADMELOG) corrective regimen sliding scale   SubCutaneous three times a day with meals  insulin lispro (ADMELOG) corrective regimen sliding scale   SubCutaneous at bedtime  lidocaine   4% Patch 1 Patch Transdermal <User Schedule>  lidocaine/prilocaine Cream 1 Application(s) Topical <User Schedule> PRN  melatonin 3 milliGRAM(s) Oral at bedtime  metoprolol succinate ER 12.5 milliGRAM(s) Oral at bedtime  Nephro-pawel 1 Tablet(s) Oral daily  polyethylene glycol 3350 17 Gram(s) Oral two times a day  pregabalin 75 milliGRAM(s) Oral at bedtime  senna 2 Tablet(s) Oral at bedtime  sevelamer carbonate 800 milliGRAM(s) Oral three times a day with meals  tamsulosin 0.4 milliGRAM(s) Oral at bedtime  zinc oxide 40% Paste 1 Application(s) Topical daily    A/P:    DM, HTN, CM  S/p SDH  ESRD on HD TTS  Fluid removal 1.5kg w/HD today  Bld work, Epoetin w/HD  ELLA    [phone]
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
## 2023-01-24 NOTE — CHART NOTE - NSCHARTNOTESELECT_GEN_ALL_CORE
Nutrition Services
Event Note
IPOC/Event Note
Nutrition Services
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
## 2023-01-24 NOTE — CHART NOTE - NSCHARTNOTEFT_GEN_A_CORE
Nutrition Follow Up Note  Hospital Course   (Per Electronic Medical Record)    Source:  Patient [X]  Medical Record [X]      Diet: Consistent Carbohydrate W/ Evening Snack   Renal Restriction No Protein Restriction, No Concentrated K, Phos & Low Sodium    Pt tolerating diet well. Receives double protein when medically feasible. Consumes 50 - 100% of meal tray as per flow sheet. No GI distress at this time. Denies N/V. Denies chewing/swallowing difficulties at this time. Last bm 1/22. Educated patient on Renal diet & Restrictions.       Enteral/Parenteral Nutrition: Not Applicable    Current Weight: 150.1lb on 1/21  153.2lb/69.6kg 1/20  158.7lb/75kg 1/17    Pertinent Medications: MEDICATIONS  (STANDING):  acetaminophen     Tablet .. 975 milliGRAM(s) Oral <User Schedule>  allopurinol 100 milliGRAM(s) Oral <User Schedule>  aspirin  chewable 81 milliGRAM(s) Oral daily  atorvastatin 80 milliGRAM(s) Oral at bedtime  chlorhexidine 2% Cloths 1 Application(s) Topical daily  clopidogrel Tablet 75 milliGRAM(s) Oral daily  dextrose 5%. 1000 milliLiter(s) (100 mL/Hr) IV Continuous <Continuous>  dextrose 5%. 1000 milliLiter(s) (50 mL/Hr) IV Continuous <Continuous>  dextrose 50% Injectable 25 Gram(s) IV Push once  dextrose 50% Injectable 12.5 Gram(s) IV Push once  dextrose 50% Injectable 25 Gram(s) IV Push once  epoetin wayne-epbx (RETACRIT) Injectable 01716 Unit(s) IV Push <User Schedule>  famotidine    Tablet 10 milliGRAM(s) Oral daily  folic acid 1 milliGRAM(s) Oral daily  glucagon  Injectable 1 milliGRAM(s) IntraMuscular once  heparin   Injectable 5000 Unit(s) SubCutaneous every 8 hours  hydrocortisone 1% Cream 1 Application(s) Topical daily  insulin glargine Injectable (LANTUS) 18 Unit(s) SubCutaneous at bedtime  insulin lispro (ADMELOG) corrective regimen sliding scale   SubCutaneous at bedtime  insulin lispro (ADMELOG) corrective regimen sliding scale   SubCutaneous three times a day with meals  lidocaine   4% Patch 1 Patch Transdermal <User Schedule>  melatonin 3 milliGRAM(s) Oral at bedtime  metoprolol succinate ER 12.5 milliGRAM(s) Oral at bedtime  polyethylene glycol 3350 17 Gram(s) Oral two times a day  pregabalin 75 milliGRAM(s) Oral at bedtime  senna 2 Tablet(s) Oral at bedtime  sevelamer carbonate 800 milliGRAM(s) Oral three times a day with meals  tamsulosin 0.4 milliGRAM(s) Oral at bedtime  zinc oxide 40% Paste 1 Application(s) Topical daily    MEDICATIONS  (PRN):  acetaminophen     Tablet .. 650 milliGRAM(s) Oral every 6 hours PRN Temp greater or equal to 38C (100.4F), Mild Pain (1 - 3)  dextrose Oral Gel 15 Gram(s) Oral once PRN Blood Glucose LESS THAN 70 milliGRAM(s)/deciliter  lidocaine/prilocaine Cream 1 Application(s) Topical <User Schedule> PRN on dialysis days      Pertinent Labs:  01-21 Na137 mmol/L Glu 198 mg/dL<H> K+ 4.3 mmol/L Cr  3.86 mg/dL<H> BUN 60 mg/dL<H> 01-21 Phos 3.8 mg/dL 01-19 Alb 2.6 g/dL<L>  POCT 89mg/dL - 219mg/dL.       Skin: Right Buttocks Stage 1 & Left Heel Stage 1    Edema: None Noted    Last Bowel Movement: on 1/22    Estimated Needs:   [X] No Change Since Previous Assessment    Previous Nutrition Diagnosis:     Nutrition Diagnosis is [X] Ongoing -  Increased nutrient needs related to increased demand for wound healing as evidenced by stage II pressure ulcer, DTI left heel, +HD - Addressed with recommend Nephrovite + Vitamin C         Interventions:   1. Recommend Nephrovite + Vitamin C   2. Recommend Continue Nutrition Plan of Care  3. Educated on Renal Diet Therapy  4. 2x protein at meals when medically feasible.     Monitoring & Evaluation:   [X] Weights   [X] PO Intake   [X] Skin Integrity   [X] Follow Up (Per Protocol)  [X] Tolerance to Diet Prescription   [X] Other: Labs     Registered Dietitian/Nutritionist Remains Available.  Chris Fairchild Dietetic Intern    Phone# [phone]

## 2023-01-25 ENCOUNTER — TRANSCRIPTION ENCOUNTER (OUTPATIENT)
Age: 63
End: 2023-01-25

## 2023-01-25 DIAGNOSIS — N18.6 END STAGE RENAL DISEASE: ICD-10-CM

## 2023-01-25 DIAGNOSIS — E11.9 TYPE 2 DIABETES MELLITUS WITHOUT COMPLICATIONS: ICD-10-CM

## 2023-01-25 LAB
GLUCOSE BLDC GLUCOMTR-MCNC: 153 MG/DL — HIGH (ref 70–99)
GLUCOSE BLDC GLUCOMTR-MCNC: 179 MG/DL — HIGH (ref 70–99)
GLUCOSE BLDC GLUCOMTR-MCNC: 185 MG/DL — HIGH (ref 70–99)
GLUCOSE BLDC GLUCOMTR-MCNC: 88 MG/DL — SIGNIFICANT CHANGE UP (ref 70–99)
SARS-COV-2 RNA SPEC QL NAA+PROBE: SIGNIFICANT CHANGE UP

## 2023-01-25 PROCEDURE — 99232 SBSQ HOSP IP/OBS MODERATE 35: CPT

## 2023-01-25 RX ORDER — CHLORHEXIDINE GLUCONATE 213 G/1000ML
1 SOLUTION TOPICAL
Qty: 0 | Refills: 0 | DISCHARGE
Start: 2023-01-25

## 2023-01-25 RX ORDER — POLYETHYLENE GLYCOL 3350 17 G/17G
17 POWDER, FOR SOLUTION ORAL
Qty: 0 | Refills: 0 | DISCHARGE
Start: 2023-01-25

## 2023-01-25 RX ORDER — INSULIN GLARGINE 100 [IU]/ML
15 INJECTION, SOLUTION SUBCUTANEOUS
Qty: 0 | Refills: 0 | DISCHARGE
Start: 2023-01-25

## 2023-01-25 RX ORDER — HYDROCORTISONE 1 %
1 OINTMENT (GRAM) TOPICAL
Qty: 0 | Refills: 0 | DISCHARGE
Start: 2023-01-25

## 2023-01-25 RX ORDER — ZINC OXIDE 200 MG/G
1 OINTMENT TOPICAL
Qty: 0 | Refills: 0 | DISCHARGE
Start: 2023-01-25

## 2023-01-25 RX ORDER — LIDOCAINE 4 G/100G
1 CREAM TOPICAL
Qty: 0 | Refills: 0 | DISCHARGE
Start: 2023-01-25

## 2023-01-25 RX ORDER — METOPROLOL TARTRATE 50 MG
12.5 TABLET ORAL
Qty: 0 | Refills: 0 | DISCHARGE
Start: 2023-01-25

## 2023-01-25 RX ORDER — ERYTHROPOIETIN 10000 [IU]/ML
10000 INJECTION, SOLUTION INTRAVENOUS; SUBCUTANEOUS
Qty: 0 | Refills: 0 | DISCHARGE
Start: 2023-01-25

## 2023-01-25 RX ORDER — INSULIN GLARGINE 100 [IU]/ML
15 INJECTION, SOLUTION SUBCUTANEOUS AT BEDTIME
Refills: 0 | Status: DISCONTINUED | OUTPATIENT
Start: 2023-01-25 | End: 2023-01-27

## 2023-01-25 RX ORDER — ALLOPURINOL 300 MG
1 TABLET ORAL
Qty: 0 | Refills: 0 | DISCHARGE
Start: 2023-01-25

## 2023-01-25 RX ORDER — ACETAMINOPHEN 500 MG
3 TABLET ORAL
Qty: 0 | Refills: 0 | DISCHARGE
Start: 2023-01-25

## 2023-01-25 RX ADMIN — ATORVASTATIN CALCIUM 80 MILLIGRAM(S): 80 TABLET, FILM COATED ORAL at 21:28

## 2023-01-25 RX ADMIN — INSULIN GLARGINE 15 UNIT(S): 100 INJECTION, SOLUTION SUBCUTANEOUS at 21:28

## 2023-01-25 RX ADMIN — HEPARIN SODIUM 5000 UNIT(S): 5000 INJECTION INTRAVENOUS; SUBCUTANEOUS at 06:06

## 2023-01-25 RX ADMIN — HEPARIN SODIUM 5000 UNIT(S): 5000 INJECTION INTRAVENOUS; SUBCUTANEOUS at 21:28

## 2023-01-25 RX ADMIN — Medication 975 MILLIGRAM(S): at 21:58

## 2023-01-25 RX ADMIN — Medication 1 APPLICATION(S): at 11:57

## 2023-01-25 RX ADMIN — Medication 975 MILLIGRAM(S): at 21:28

## 2023-01-25 RX ADMIN — Medication 1 MILLIGRAM(S): at 11:57

## 2023-01-25 RX ADMIN — LIDOCAINE 1 PATCH: 4 CREAM TOPICAL at 19:08

## 2023-01-25 RX ADMIN — SEVELAMER CARBONATE 800 MILLIGRAM(S): 2400 POWDER, FOR SUSPENSION ORAL at 17:17

## 2023-01-25 RX ADMIN — Medication 3 MILLIGRAM(S): at 21:28

## 2023-01-25 RX ADMIN — FAMOTIDINE 10 MILLIGRAM(S): 10 INJECTION INTRAVENOUS at 11:54

## 2023-01-25 RX ADMIN — ZINC OXIDE 1 APPLICATION(S): 200 OINTMENT TOPICAL at 11:59

## 2023-01-25 RX ADMIN — Medication 500 MILLIGRAM(S): at 11:56

## 2023-01-25 RX ADMIN — SEVELAMER CARBONATE 800 MILLIGRAM(S): 2400 POWDER, FOR SUSPENSION ORAL at 11:58

## 2023-01-25 RX ADMIN — Medication 100 MILLIGRAM(S): at 07:23

## 2023-01-25 RX ADMIN — TAMSULOSIN HYDROCHLORIDE 0.4 MILLIGRAM(S): 0.4 CAPSULE ORAL at 21:28

## 2023-01-25 RX ADMIN — SEVELAMER CARBONATE 800 MILLIGRAM(S): 2400 POWDER, FOR SUSPENSION ORAL at 07:22

## 2023-01-25 RX ADMIN — CHLORHEXIDINE GLUCONATE 1 APPLICATION(S): 213 SOLUTION TOPICAL at 06:06

## 2023-01-25 RX ADMIN — Medication 4: at 11:57

## 2023-01-25 RX ADMIN — Medication 1 TABLET(S): at 11:58

## 2023-01-25 RX ADMIN — Medication 81 MILLIGRAM(S): at 11:52

## 2023-01-25 RX ADMIN — Medication 75 MILLIGRAM(S): at 21:28

## 2023-01-25 RX ADMIN — Medication 4: at 17:14

## 2023-01-25 RX ADMIN — CLOPIDOGREL BISULFATE 75 MILLIGRAM(S): 75 TABLET, FILM COATED ORAL at 11:54

## 2023-01-25 RX ADMIN — LIDOCAINE 1 PATCH: 4 CREAM TOPICAL at 07:22

## 2023-01-25 RX ADMIN — HEPARIN SODIUM 5000 UNIT(S): 5000 INJECTION INTRAVENOUS; SUBCUTANEOUS at 13:04

## 2023-01-25 RX ADMIN — Medication 12.5 MILLIGRAM(S): at 21:28

## 2023-01-25 RX ADMIN — POLYETHYLENE GLYCOL 3350 17 GRAM(S): 17 POWDER, FOR SOLUTION ORAL at 17:17

## 2023-01-25 NOTE — PROGRESS NOTE ADULT - SUBJECTIVE AND OBJECTIVE BOX
Patient is a 62y old  Male who presents with a chief complaint of Traumatic SDH (24 Jan 2023 17:16)      Patient seen and examined at bedside.  No overnight events  No complaints this morning    ALLERGIES:  No Known Drug Allergies  shellfish (Unknown)    MEDICATIONS  (STANDING):  acetaminophen     Tablet .. 975 milliGRAM(s) Oral <User Schedule>  allopurinol 100 milliGRAM(s) Oral <User Schedule>  ascorbic acid 500 milliGRAM(s) Oral daily  aspirin  chewable 81 milliGRAM(s) Oral daily  atorvastatin 80 milliGRAM(s) Oral at bedtime  chlorhexidine 2% Cloths 1 Application(s) Topical daily  clopidogrel Tablet 75 milliGRAM(s) Oral daily  dextrose 5%. 1000 milliLiter(s) (100 mL/Hr) IV Continuous <Continuous>  dextrose 5%. 1000 milliLiter(s) (50 mL/Hr) IV Continuous <Continuous>  dextrose 50% Injectable 25 Gram(s) IV Push once  dextrose 50% Injectable 12.5 Gram(s) IV Push once  dextrose 50% Injectable 25 Gram(s) IV Push once  epoetin wayne-epbx (RETACRIT) Injectable 71956 Unit(s) IV Push <User Schedule>  famotidine    Tablet 10 milliGRAM(s) Oral daily  folic acid 1 milliGRAM(s) Oral daily  glucagon  Injectable 1 milliGRAM(s) IntraMuscular once  heparin   Injectable 5000 Unit(s) SubCutaneous every 8 hours  hydrocortisone 1% Cream 1 Application(s) Topical daily  insulin glargine Injectable (LANTUS) 18 Unit(s) SubCutaneous at bedtime  insulin lispro (ADMELOG) corrective regimen sliding scale   SubCutaneous at bedtime  insulin lispro (ADMELOG) corrective regimen sliding scale   SubCutaneous three times a day with meals  lidocaine   4% Patch 1 Patch Transdermal <User Schedule>  melatonin 3 milliGRAM(s) Oral at bedtime  metoprolol succinate ER 12.5 milliGRAM(s) Oral at bedtime  Nephro-pawel 1 Tablet(s) Oral daily  polyethylene glycol 3350 17 Gram(s) Oral two times a day  pregabalin 75 milliGRAM(s) Oral at bedtime  senna 2 Tablet(s) Oral at bedtime  sevelamer carbonate 800 milliGRAM(s) Oral three times a day with meals  tamsulosin 0.4 milliGRAM(s) Oral at bedtime  zinc oxide 40% Paste 1 Application(s) Topical daily    MEDICATIONS  (PRN):  acetaminophen     Tablet .. 650 milliGRAM(s) Oral every 6 hours PRN Temp greater or equal to 38C (100.4F), Mild Pain (1 - 3)  dextrose Oral Gel 15 Gram(s) Oral once PRN Blood Glucose LESS THAN 70 milliGRAM(s)/deciliter  lidocaine/prilocaine Cream 1 Application(s) Topical <User Schedule> PRN on dialysis days    Vital Signs Last 24 Hrs  T(F): 97.8 (25 Jan 2023 07:26), Max: 98.3 (24 Jan 2023 13:54)  HR: 91 (25 Jan 2023 07:26) (91 - 108)  BP: 160/68 (25 Jan 2023 07:26) (114/69 - 160/68)  RR: 16 (25 Jan 2023 08:12) (16 - 16)  SpO2: 98% (25 Jan 2023 08:12) (97% - 99%)  I&O's Summary    24 Jan 2023 07:01  -  25 Jan 2023 07:00  --------------------------------------------------------  IN: 800 mL / OUT: 2550 mL / NET: -1750 mL    PHYSICAL EXAM:  GENERAL: NAD  HENT:  Atraumatic, Normocephalic; No tonsillar erythema, exudates, or enlargement; Moist mucous membranes;   EYES: EOMI, PERRLA, conjunctiva and sclera clear, no lid-lag  NECK: Supple, No JVD, Normal thyroid  CHEST/LUNG: Clear to percussion bilaterally; No rales, rhonchi, wheezing, or rubs; normal respiratory effort, no intercostal retractions  HEART: Regular rate and rhythm; No murmurs, rubs, or gallops; No pitting edema  ABDOMEN: Soft, Nontender, Nondistended; Bowel sounds present; No HSM  MUSCULOSKELETAL/EXTREMITIES:  2+ Peripheral Pulses, No clubbing or digital cyanosis  PSYCH: Appropriate affect, Alert & Awake; Good judgement    LABS:                        8.5    6.61  )-----------( 285      ( 24 Jan 2023 16:40 )             26.5       01-24    135  |  98  |  61  ----------------------------<  196  4.6   |  22  |  4.52    Ca    8.9      24 Jan 2023 14:17    TPro  7.0  /  Alb  2.8  /  TBili  0.3  /  DBili  x   /  AST  14  /  ALT  10  /  AlkPhos  80  01-24     POCT Blood Glucose.: 88 mg/dL (25 Jan 2023 07:17)  POCT Blood Glucose.: 184 mg/dL (24 Jan 2023 21:06)  POCT Blood Glucose.: 99 mg/dL (24 Jan 2023 17:06)  POCT Blood Glucose.: 237 mg/dL (24 Jan 2023 12:16)    COVID-19 PCR: Anastasiia (12-30-22 @ 09:48)      Care Discussed with Rehab Attending and Other Providers   Drysol Counseling:  I discussed with the patient the risks of drysol/aluminum chloride including but not limited to skin rash, itching, irritation, burning.

## 2023-01-25 NOTE — DISCHARGE NOTE PROVIDER - NSDCCPCAREPLAN_GEN_ALL_CORE_FT
PRINCIPAL DISCHARGE DIAGNOSIS  Diagnosis: SDH (subdural hematoma)  Assessment and Plan of Treatment: You were admitted to [facility] following your diagnosis of SDH.  You completed a course of acute rehab and will continue therapies at subacute rehab.  Please follow up with Dr. Baca (neurology) while subacute rehab.  After discharge from [facility] please follow up with Dr. Escoto and your PCP.      SECONDARY DISCHARGE DIAGNOSES  Diagnosis: Orthostatic hypotension  Assessment and Plan of Treatment: During your admission you were noted to have symptomatic hypotension and your BP meds were optimized.  Please continue with metoprolol succinate ER daily.  Continue follow-up with cardiology and PCP upon discharge from [facility]
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
## 2023-01-25 NOTE — DISCHARGE NOTE PROVIDER - CARE PROVIDERS DIRECT ADDRESSES
,DirectAddress_Unknown,[email or web address]

## 2023-01-25 NOTE — DISCHARGE NOTE PROVIDER - PROVIDER TOKENS
PROVIDER:[TOKEN:[[id number]]],PROVIDER:[TOKEN:[[id number]]],PROVIDER:[TOKEN:[[id number]]]

## 2023-01-25 NOTE — DISCHARGE NOTE PROVIDER - NSDCMRMEDTOKEN_GEN_ALL_CORE_FT
acetaminophen 325 mg oral tablet: 2 tab(s) orally every 6 hours, As needed, Temp greater or equal to 38C (100.4F), Mild Pain (1 - 3)  acetaminophen 325 mg oral tablet: 3 tab(s) orally once a day (at bedtime)  allopurinol 100 mg oral tablet: 1 tab(s) orally once a day at 9am  ascorbic acid 500 mg oral tablet: 1 tab(s) orally once a day  aspirin 81 mg oral tablet, chewable: 1 tab(s) orally once a day  atorvastatin 80 mg oral tablet: 1 tab(s) orally once a day (at bedtime)  chlorhexidine 2% topical pad: 1 application topically once a day  clopidogrel 75 mg oral tablet: 1 tab(s) orally once a day  epoetin wayne: 85766 unit(s) injectable 3 times a week; (Annalisa)  famotidine 10 mg oral tablet: 1 tab(s) orally once a day  folic acid 1 mg oral tablet: 1 tab(s) orally once a day  heparin: 5000 unit(s) subcutaneous every 8 hours  hydrocortisone 1% topical cream: 1 application topically once a day  insulin glargine 100 units/mL subcutaneous solution: 15 unit(s) subcutaneous once a day (at bedtime)  lidocaine 4% topical film: Apply topically to affected area every 24 hours; Apply to affect area with 12 hours on and 12 hours off  melatonin 3 mg oral tablet: 1 tab(s) orally once a day (at bedtime)  metoprolol: 12.5 milligram(s) orally once a day (at bedtime)  multivitamin: 1 tab(s) orally once a day  polyethylene glycol 3350 oral powder for reconstitution: 17 gram(s) orally 2 times a day  pregabalin 75 mg oral capsule: 1 cap(s) orally once a day (at bedtime)  senna leaf extract oral tablet: 2 tab(s) orally once a day (at bedtime)  sevelamer carbonate 800 mg oral tablet: 1 tab(s) orally 3 times a day (with meals)  tamsulosin 0.4 mg oral capsule: 1 cap(s) orally once a day (at bedtime)  zinc oxide 40% topical ointment: Apply topically to affected area once a day apply to buttocks

## 2023-01-25 NOTE — DISCHARGE NOTE PROVIDER - CARE PROVIDER_API CALL
Sidney Baca)  Neurology; Neurophysiology  [address], [address]  [place] [address]  Phone: [phone]  Fax: [phone]  Follow Up Time:     Rosaline Israel (DO)  Family Medicine  [address], [address]  [place] [address]  Phone: [phone]  Fax: [phone]  Follow Up Time:     Massiel Escoto (DO)  Brain Injury Medicine; PhysicalRehab Medicine  [address] [place] [address]  Phone: [phone]  Fax: [phone]  Follow Up Time:
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
## 2023-01-25 NOTE — PROGRESS NOTE ADULT - SUBJECTIVE AND OBJECTIVE BOX
No distress    Vital Signs Last 24 Hrs  T(C): 37.1 (01-25-23 @ 20:22), Max: 37.1 (01-25-23 @ 20:22)  T(F): 98.8 (01-25-23 @ 20:22), Max: 98.8 (01-25-23 @ 20:22)  HR: 92 (01-25-23 @ 20:22) (91 - 92)  BP: 145/69 (01-25-23 @ 20:22) (145/69 - 160/68)  RR: 18 (01-25-23 @ 20:22) (16 - 18)  SpO2: 97% (01-25-23 @ 20:22) (97% - 98%)    I&O's Detail    24 Jan 2023 07:01  -  25 Jan 2023 07:00  --------------------------------------------------------  IN:    Other (mL): 800 mL  Total IN: 800 mL    OUT:    Other (mL): 2300 mL    Voided (mL): 250 mL  Total OUT: 2550 mL    Total NET: -1750 mL    s1s2  b/l air entry  soft, ND  no edema                                                                        8.5    6.61  )-----------( 285      ( 24 Jan 2023 16:40 )             26.5     24 Jan 2023 14:17    135    |  98     |  61     ----------------------------<  196    4.6     |  22     |  4.52     Ca    8.9        24 Jan 2023 14:17    TPro  7.0    /  Alb  2.8    /  TBili  0.3    /  DBili  x      /  AST  14     /  ALT  10     /  AlkPhos  80     24 Jan 2023 14:17    LIVER FUNCTIONS - ( 24 Jan 2023 14:17 )  Alb: 2.8 g/dL / Pro: 7.0 g/dL / ALK PHOS: 80 U/L / ALT: 10 U/L / AST: 14 U/L / GGT: x           acetaminophen     Tablet .. 650 milliGRAM(s) Oral every 6 hours PRN  acetaminophen     Tablet .. 975 milliGRAM(s) Oral <User Schedule>  allopurinol 100 milliGRAM(s) Oral <User Schedule>  ascorbic acid 500 milliGRAM(s) Oral daily  aspirin  chewable 81 milliGRAM(s) Oral daily  atorvastatin 80 milliGRAM(s) Oral at bedtime  chlorhexidine 2% Cloths 1 Application(s) Topical daily  clopidogrel Tablet 75 milliGRAM(s) Oral daily  dextrose 5%. 1000 milliLiter(s) IV Continuous <Continuous>  dextrose 5%. 1000 milliLiter(s) IV Continuous <Continuous>  dextrose 50% Injectable 25 Gram(s) IV Push once  dextrose 50% Injectable 12.5 Gram(s) IV Push once  dextrose 50% Injectable 25 Gram(s) IV Push once  dextrose Oral Gel 15 Gram(s) Oral once PRN  epoetin wayne-epbx (RETACRIT) Injectable 54502 Unit(s) IV Push <User Schedule>  famotidine    Tablet 10 milliGRAM(s) Oral daily  folic acid 1 milliGRAM(s) Oral daily  glucagon  Injectable 1 milliGRAM(s) IntraMuscular once  heparin   Injectable 5000 Unit(s) SubCutaneous every 8 hours  hydrocortisone 1% Cream 1 Application(s) Topical daily  insulin glargine Injectable (LANTUS) 15 Unit(s) SubCutaneous at bedtime  insulin lispro (ADMELOG) corrective regimen sliding scale   SubCutaneous three times a day with meals  insulin lispro (ADMELOG) corrective regimen sliding scale   SubCutaneous at bedtime  lidocaine   4% Patch 1 Patch Transdermal <User Schedule>  lidocaine/prilocaine Cream 1 Application(s) Topical <User Schedule> PRN  melatonin 3 milliGRAM(s) Oral at bedtime  metoprolol succinate ER 12.5 milliGRAM(s) Oral at bedtime  Nephro-pawel 1 Tablet(s) Oral daily  polyethylene glycol 3350 17 Gram(s) Oral two times a day  pregabalin 75 milliGRAM(s) Oral at bedtime  senna 2 Tablet(s) Oral at bedtime  sevelamer carbonate 800 milliGRAM(s) Oral three times a day with meals  tamsulosin 0.4 milliGRAM(s) Oral at bedtime  zinc oxide 40% Paste 1 Application(s) Topical daily    A/P:    DM, HTN, CM  S/p SDH  ESRD on HD TTS  Fluid removal 1-2kg as tolerates  Bld work, Epoetin w/HD  ELLA    [phone]

## 2023-01-25 NOTE — PROGRESS NOTE ADULT - SUBJECTIVE AND OBJECTIVE BOX
SUBJECTIVE/OBJECTIVE: Patient seen and examined. No acute overnight events, slept well. Reports improvement in bilateral leg pain with Lyrica. No other complaints.      REVIEW OF SYSTEMS  ongoing bilateral lower extremity neuropathic pain  Denies CP, Abdominal pain, N/V    VITALS  62y  Vital Signs Last 24 Hrs  T(C): 36.6 (2023 07:26), Max: 36.8 (2023 13:54)  T(F): 97.8 (2023 07:26), Max: 98.3 (2023 13:54)  HR: 91 (2023 07:26) (91 - 108)  BP: 160/68 (2023 07:26) (114/69 - 160/68)  RR: 16 (2023 08:12) (16 - 16)  SpO2: 98% (2023 08:12) (97% - 99%)    Parameters below as of 2023 08:12  Patient On (Oxygen Delivery Method): room air      Daily     Daily Weight in k (2023 17:00)      PHYSICAL EXAM  Constitutional - NAD, Comfortable sitting in the wheelchair   HEENT - NCAT, EOMI  Chest - breathing comfortably on RA  Cardiovascular - RRR,  warm well perfused  Abdomen - , Soft, NTND-  Extremities - No edema,   Neurologic Exam -                    Cognitive - Awake, Alert, AAO x 3     Memory: delayed processing     Motor -                    left sided weakness  Skin: left heel with open wound --> Cavilon barrier film applied with allvyn dressing  MSK-- Tenderness along wrist extensor tendon,  No edema or effusion. No tenderness along flexor tendons. --improved in splint    RECENT LABS:                        8.5    6.61  )-----------( 285      ( 2023 16:40 )             26.5     01-24    135  |  98  |  61<H>  ----------------------------<  196<H>  4.6   |  22  |  4.52<H>    Ca    8.9      2023 14:17    TPro  7.0  /  Alb  2.8<L>  /  TBili  0.3  /  DBili  x   /  AST  14  /  ALT  10  /  AlkPhos  80      LIVER FUNCTIONS - ( 2023 14:17 )  Alb: 2.8 g/dL / Pro: 7.0 g/dL / ALK PHOS: 80 U/L / ALT: 10 U/L / AST: 14 U/L / GGT: x             CAPILLARY BLOOD GLUCOSE      POCT Blood Glucose.: 153 mg/dL (2023 11:42)  POCT Blood Glucose.: 88 mg/dL (2023 07:17)  POCT Blood Glucose.: 184 mg/dL (2023 21:06)  POCT Blood Glucose.: 99 mg/dL (2023 17:06)    MEDICATIONS:  MEDICATIONS  (STANDING):  acetaminophen     Tablet .. 975 milliGRAM(s) Oral <User Schedule>  allopurinol 100 milliGRAM(s) Oral <User Schedule>  ascorbic acid 500 milliGRAM(s) Oral daily  aspirin  chewable 81 milliGRAM(s) Oral daily  atorvastatin 80 milliGRAM(s) Oral at bedtime  chlorhexidine 2% Cloths 1 Application(s) Topical daily  clopidogrel Tablet 75 milliGRAM(s) Oral daily  dextrose 5%. 1000 milliLiter(s) (50 mL/Hr) IV Continuous <Continuous>  dextrose 5%. 1000 milliLiter(s) (100 mL/Hr) IV Continuous <Continuous>  dextrose 50% Injectable 25 Gram(s) IV Push once  dextrose 50% Injectable 12.5 Gram(s) IV Push once  dextrose 50% Injectable 25 Gram(s) IV Push once  epoetin wayne-epbx (RETACRIT) Injectable 72359 Unit(s) IV Push <User Schedule>  famotidine    Tablet 10 milliGRAM(s) Oral daily  folic acid 1 milliGRAM(s) Oral daily  glucagon  Injectable 1 milliGRAM(s) IntraMuscular once  heparin   Injectable 5000 Unit(s) SubCutaneous every 8 hours  hydrocortisone 1% Cream 1 Application(s) Topical daily  insulin glargine Injectable (LANTUS) 15 Unit(s) SubCutaneous at bedtime  insulin lispro (ADMELOG) corrective regimen sliding scale   SubCutaneous at bedtime  insulin lispro (ADMELOG) corrective regimen sliding scale   SubCutaneous three times a day with meals  lidocaine   4% Patch 1 Patch Transdermal <User Schedule>  melatonin 3 milliGRAM(s) Oral at bedtime  metoprolol succinate ER 12.5 milliGRAM(s) Oral at bedtime  Nephro-pawel 1 Tablet(s) Oral daily  polyethylene glycol 3350 17 Gram(s) Oral two times a day  pregabalin 75 milliGRAM(s) Oral at bedtime  senna 2 Tablet(s) Oral at bedtime  sevelamer carbonate 800 milliGRAM(s) Oral three times a day with meals  tamsulosin 0.4 milliGRAM(s) Oral at bedtime  zinc oxide 40% Paste 1 Application(s) Topical daily    MEDICATIONS  (PRN):  acetaminophen     Tablet .. 650 milliGRAM(s) Oral every 6 hours PRN Temp greater or equal to 38C (100.4F), Mild Pain (1 - 3)  dextrose Oral Gel 15 Gram(s) Oral once PRN Blood Glucose LESS THAN 70 milliGRAM(s)/deciliter  lidocaine/prilocaine Cream 1 Application(s) Topical <User Schedule> PRN on dialysis days     SUBJECTIVE/OBJECTIVE: Patient seen and examined. No acute overnight events, slept well. Reports improvement in bilateral leg pain with Lyrica. No other complaints.  right wrist pain improved.        REVIEW OF SYSTEMS  ongoing bilateral lower extremity neuropathic pain  Denies CP, Abdominal pain, N/V    VITALS  62y  Vital Signs Last 24 Hrs  T(C): 36.6 (2023 07:26), Max: 36.8 (2023 13:54)  T(F): 97.8 (2023 07:26), Max: 98.3 (2023 13:54)  HR: 91 (2023 07:26) (91 - 108)  BP: 160/68 (2023 07:26) (114/69 - 160/68)  RR: 16 (2023 08:12) (16 - 16)  SpO2: 98% (2023 08:12) (97% - 99%)    Parameters below as of 2023 08:12  Patient On (Oxygen Delivery Method): room air      Daily     Daily Weight in k (2023 17:00)      PHYSICAL EXAM  Constitutional - NAD, Comfortable sitting in the wheelchair   HEENT - NCAT, EOMI  Chest - breathing comfortably on RA  Cardiovascular - RRR,  warm well perfused  Abdomen - , Soft, NTND-  Extremities - No edema,   Neurologic Exam -                    Cognitive - Awake, Alert, AAO x 3     Memory: delayed processing     Motor -                    left sided weakness  Skin: left heel with open wound --> Cavilon barrier film applied with allvyn dressing  MSK-- Tenderness along wrist extensor tendon,  No edema or effusion. No tenderness along flexor tendons. --improved in splint    RECENT LABS:                        8.5    6.61  )-----------( 285      ( 2023 16:40 )             26.5     01-24    135  |  98  |  61<H>  ----------------------------<  196<H>  4.6   |  22  |  4.52<H>    Ca    8.9      2023 14:17    TPro  7.0  /  Alb  2.8<L>  /  TBili  0.3  /  DBili  x   /  AST  14  /  ALT  10  /  AlkPhos  80      LIVER FUNCTIONS - ( 2023 14:17 )  Alb: 2.8 g/dL / Pro: 7.0 g/dL / ALK PHOS: 80 U/L / ALT: 10 U/L / AST: 14 U/L / GGT: x             CAPILLARY BLOOD GLUCOSE      POCT Blood Glucose.: 153 mg/dL (2023 11:42)  POCT Blood Glucose.: 88 mg/dL (2023 07:17)  POCT Blood Glucose.: 184 mg/dL (2023 21:06)  POCT Blood Glucose.: 99 mg/dL (2023 17:06)    MEDICATIONS:  MEDICATIONS  (STANDING):  acetaminophen     Tablet .. 975 milliGRAM(s) Oral <User Schedule>  allopurinol 100 milliGRAM(s) Oral <User Schedule>  ascorbic acid 500 milliGRAM(s) Oral daily  aspirin  chewable 81 milliGRAM(s) Oral daily  atorvastatin 80 milliGRAM(s) Oral at bedtime  chlorhexidine 2% Cloths 1 Application(s) Topical daily  clopidogrel Tablet 75 milliGRAM(s) Oral daily  dextrose 5%. 1000 milliLiter(s) (50 mL/Hr) IV Continuous <Continuous>  dextrose 5%. 1000 milliLiter(s) (100 mL/Hr) IV Continuous <Continuous>  dextrose 50% Injectable 25 Gram(s) IV Push once  dextrose 50% Injectable 12.5 Gram(s) IV Push once  dextrose 50% Injectable 25 Gram(s) IV Push once  epoetin wayne-epbx (RETACRIT) Injectable 21341 Unit(s) IV Push <User Schedule>  famotidine    Tablet 10 milliGRAM(s) Oral daily  folic acid 1 milliGRAM(s) Oral daily  glucagon  Injectable 1 milliGRAM(s) IntraMuscular once  heparin   Injectable 5000 Unit(s) SubCutaneous every 8 hours  hydrocortisone 1% Cream 1 Application(s) Topical daily  insulin glargine Injectable (LANTUS) 15 Unit(s) SubCutaneous at bedtime  insulin lispro (ADMELOG) corrective regimen sliding scale   SubCutaneous at bedtime  insulin lispro (ADMELOG) corrective regimen sliding scale   SubCutaneous three times a day with meals  lidocaine   4% Patch 1 Patch Transdermal <User Schedule>  melatonin 3 milliGRAM(s) Oral at bedtime  metoprolol succinate ER 12.5 milliGRAM(s) Oral at bedtime  Nephro-pawel 1 Tablet(s) Oral daily  polyethylene glycol 3350 17 Gram(s) Oral two times a day  pregabalin 75 milliGRAM(s) Oral at bedtime  senna 2 Tablet(s) Oral at bedtime  sevelamer carbonate 800 milliGRAM(s) Oral three times a day with meals  tamsulosin 0.4 milliGRAM(s) Oral at bedtime  zinc oxide 40% Paste 1 Application(s) Topical daily    MEDICATIONS  (PRN):  acetaminophen     Tablet .. 650 milliGRAM(s) Oral every 6 hours PRN Temp greater or equal to 38C (100.4F), Mild Pain (1 - 3)  dextrose Oral Gel 15 Gram(s) Oral once PRN Blood Glucose LESS THAN 70 milliGRAM(s)/deciliter  lidocaine/prilocaine Cream 1 Application(s) Topical <User Schedule> PRN on dialysis days

## 2023-01-26 LAB
ALBUMIN SERPL ELPH-MCNC: 2.9 G/DL — LOW (ref 3.3–5)
ALP SERPL-CCNC: 92 U/L — SIGNIFICANT CHANGE UP (ref 40–120)
ALT FLD-CCNC: 10 U/L — SIGNIFICANT CHANGE UP (ref 10–45)
ANION GAP SERPL CALC-SCNC: 12 MMOL/L — SIGNIFICANT CHANGE UP (ref 5–17)
AST SERPL-CCNC: 14 U/L — SIGNIFICANT CHANGE UP (ref 10–40)
BILIRUB SERPL-MCNC: 0.3 MG/DL — SIGNIFICANT CHANGE UP (ref 0.2–1.2)
BUN SERPL-MCNC: 73 MG/DL — HIGH (ref 7–23)
CALCIUM SERPL-MCNC: 8.8 MG/DL — SIGNIFICANT CHANGE UP (ref 8.4–10.5)
CHLORIDE SERPL-SCNC: 98 MMOL/L — SIGNIFICANT CHANGE UP (ref 96–108)
CO2 SERPL-SCNC: 25 MMOL/L — SIGNIFICANT CHANGE UP (ref 22–31)
CREAT SERPL-MCNC: 4.64 MG/DL — HIGH (ref 0.5–1.3)
EGFR: 13 ML/MIN/1.73M2 — LOW
GLUCOSE BLDC GLUCOMTR-MCNC: 118 MG/DL — HIGH (ref 70–99)
GLUCOSE BLDC GLUCOMTR-MCNC: 119 MG/DL — HIGH (ref 70–99)
GLUCOSE BLDC GLUCOMTR-MCNC: 182 MG/DL — HIGH (ref 70–99)
GLUCOSE BLDC GLUCOMTR-MCNC: 207 MG/DL — HIGH (ref 70–99)
GLUCOSE SERPL-MCNC: 191 MG/DL — HIGH (ref 70–99)
HCT VFR BLD CALC: 27.2 % — LOW (ref 39–50)
HGB BLD-MCNC: 8.8 G/DL — LOW (ref 13–17)
MCHC RBC-ENTMCNC: 27.7 PG — SIGNIFICANT CHANGE UP (ref 27–34)
MCHC RBC-ENTMCNC: 32.4 GM/DL — SIGNIFICANT CHANGE UP (ref 32–36)
MCV RBC AUTO: 85.5 FL — SIGNIFICANT CHANGE UP (ref 80–100)
NRBC # BLD: 0 /100 WBCS — SIGNIFICANT CHANGE UP (ref 0–0)
PLATELET # BLD AUTO: 286 K/UL — SIGNIFICANT CHANGE UP (ref 150–400)
POTASSIUM SERPL-MCNC: 4.5 MMOL/L — SIGNIFICANT CHANGE UP (ref 3.5–5.3)
POTASSIUM SERPL-SCNC: 4.5 MMOL/L — SIGNIFICANT CHANGE UP (ref 3.5–5.3)
PROT SERPL-MCNC: 6.7 G/DL — SIGNIFICANT CHANGE UP (ref 6–8.3)
RBC # BLD: 3.18 M/UL — LOW (ref 4.2–5.8)
RBC # FLD: 15.8 % — HIGH (ref 10.3–14.5)
SODIUM SERPL-SCNC: 135 MMOL/L — SIGNIFICANT CHANGE UP (ref 135–145)
WBC # BLD: 5.12 K/UL — SIGNIFICANT CHANGE UP (ref 3.8–10.5)
WBC # FLD AUTO: 5.12 K/UL — SIGNIFICANT CHANGE UP (ref 3.8–10.5)

## 2023-01-26 PROCEDURE — 99232 SBSQ HOSP IP/OBS MODERATE 35: CPT

## 2023-01-26 RX ADMIN — ERYTHROPOIETIN 10000 UNIT(S): 10000 INJECTION, SOLUTION INTRAVENOUS; SUBCUTANEOUS at 16:38

## 2023-01-26 RX ADMIN — ATORVASTATIN CALCIUM 80 MILLIGRAM(S): 80 TABLET, FILM COATED ORAL at 21:10

## 2023-01-26 RX ADMIN — ZINC OXIDE 1 APPLICATION(S): 200 OINTMENT TOPICAL at 12:37

## 2023-01-26 RX ADMIN — HEPARIN SODIUM 5000 UNIT(S): 5000 INJECTION INTRAVENOUS; SUBCUTANEOUS at 21:12

## 2023-01-26 RX ADMIN — Medication 1 MILLIGRAM(S): at 12:33

## 2023-01-26 RX ADMIN — LIDOCAINE 1 PATCH: 4 CREAM TOPICAL at 21:48

## 2023-01-26 RX ADMIN — SEVELAMER CARBONATE 800 MILLIGRAM(S): 2400 POWDER, FOR SUSPENSION ORAL at 17:31

## 2023-01-26 RX ADMIN — Medication 12.5 MILLIGRAM(S): at 21:10

## 2023-01-26 RX ADMIN — SEVELAMER CARBONATE 800 MILLIGRAM(S): 2400 POWDER, FOR SUSPENSION ORAL at 08:53

## 2023-01-26 RX ADMIN — TAMSULOSIN HYDROCHLORIDE 0.4 MILLIGRAM(S): 0.4 CAPSULE ORAL at 21:10

## 2023-01-26 RX ADMIN — CLOPIDOGREL BISULFATE 75 MILLIGRAM(S): 75 TABLET, FILM COATED ORAL at 12:33

## 2023-01-26 RX ADMIN — INSULIN GLARGINE 15 UNIT(S): 100 INJECTION, SOLUTION SUBCUTANEOUS at 21:12

## 2023-01-26 RX ADMIN — SEVELAMER CARBONATE 800 MILLIGRAM(S): 2400 POWDER, FOR SUSPENSION ORAL at 12:33

## 2023-01-26 RX ADMIN — Medication 1 TABLET(S): at 12:32

## 2023-01-26 RX ADMIN — Medication 81 MILLIGRAM(S): at 12:33

## 2023-01-26 RX ADMIN — HEPARIN SODIUM 5000 UNIT(S): 5000 INJECTION INTRAVENOUS; SUBCUTANEOUS at 13:30

## 2023-01-26 RX ADMIN — Medication 500 MILLIGRAM(S): at 12:32

## 2023-01-26 RX ADMIN — Medication 100 MILLIGRAM(S): at 08:53

## 2023-01-26 RX ADMIN — Medication 975 MILLIGRAM(S): at 22:47

## 2023-01-26 RX ADMIN — LIDOCAINE 1 PATCH: 4 CREAM TOPICAL at 18:47

## 2023-01-26 RX ADMIN — Medication 75 MILLIGRAM(S): at 21:13

## 2023-01-26 RX ADMIN — LIDOCAINE AND PRILOCAINE CREAM 1 APPLICATION(S): 25; 25 CREAM TOPICAL at 12:35

## 2023-01-26 RX ADMIN — FAMOTIDINE 10 MILLIGRAM(S): 10 INJECTION INTRAVENOUS at 12:32

## 2023-01-26 RX ADMIN — Medication 1 APPLICATION(S): at 12:36

## 2023-01-26 RX ADMIN — CHLORHEXIDINE GLUCONATE 1 APPLICATION(S): 213 SOLUTION TOPICAL at 06:12

## 2023-01-26 RX ADMIN — Medication 4: at 12:28

## 2023-01-26 RX ADMIN — Medication 3 MILLIGRAM(S): at 21:13

## 2023-01-26 RX ADMIN — Medication 975 MILLIGRAM(S): at 21:12

## 2023-01-26 RX ADMIN — LIDOCAINE 1 PATCH: 4 CREAM TOPICAL at 08:51

## 2023-01-26 RX ADMIN — HEPARIN SODIUM 5000 UNIT(S): 5000 INJECTION INTRAVENOUS; SUBCUTANEOUS at 06:12

## 2023-01-26 RX ADMIN — SENNA PLUS 2 TABLET(S): 8.6 TABLET ORAL at 21:14
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
## 2023-01-26 NOTE — PROGRESS NOTE ADULT - SUBJECTIVE AND OBJECTIVE BOX
Seen on HD    Vital Signs Last 24 Hrs  T(C): 36.5 (01-26-23 @ 13:34), Max: 37.1 (01-25-23 @ 20:22)  T(F): 97.7 (01-26-23 @ 13:34), Max: 98.8 (01-25-23 @ 20:22)  HR: 85 (01-26-23 @ 13:34) (81 - 92)  BP: 164/91 (01-26-23 @ 13:34) (127/86 - 164/91)  RR: 16 (01-26-23 @ 13:34) (16 - 18)  SpO2: 100% (01-26-23 @ 13:34) (96% - 100%)    s1s2  b/l air entry  soft, ND  no edema                                                                                8.8    5.12  )-----------( 286      ( 26 Jan 2023 16:45 )             27.2     26 Jan 2023 14:17    135    |  98     |  73     ----------------------------<  191    4.5     |  25     |  4.64     Ca    8.8        26 Jan 2023 14:17    TPro  6.7    /  Alb  2.9    /  TBili  0.3    /  DBili  x      /  AST  14     /  ALT  10     /  AlkPhos  92     26 Jan 2023 14:17    LIVER FUNCTIONS - ( 26 Jan 2023 14:17 )  Alb: 2.9 g/dL / Pro: 6.7 g/dL / ALK PHOS: 92 U/L / ALT: 10 U/L / AST: 14 U/L / GGT: x           acetaminophen     Tablet .. 650 milliGRAM(s) Oral every 6 hours PRN  acetaminophen     Tablet .. 975 milliGRAM(s) Oral <User Schedule>  allopurinol 100 milliGRAM(s) Oral <User Schedule>  ascorbic acid 500 milliGRAM(s) Oral daily  aspirin  chewable 81 milliGRAM(s) Oral daily  atorvastatin 80 milliGRAM(s) Oral at bedtime  chlorhexidine 2% Cloths 1 Application(s) Topical daily  clopidogrel Tablet 75 milliGRAM(s) Oral daily  dextrose 5%. 1000 milliLiter(s) IV Continuous <Continuous>  dextrose 5%. 1000 milliLiter(s) IV Continuous <Continuous>  dextrose 50% Injectable 25 Gram(s) IV Push once  dextrose 50% Injectable 12.5 Gram(s) IV Push once  dextrose 50% Injectable 25 Gram(s) IV Push once  dextrose Oral Gel 15 Gram(s) Oral once PRN  epoetin wayne-epbx (RETACRIT) Injectable 59058 Unit(s) IV Push <User Schedule>  famotidine    Tablet 10 milliGRAM(s) Oral daily  folic acid 1 milliGRAM(s) Oral daily  glucagon  Injectable 1 milliGRAM(s) IntraMuscular once  heparin   Injectable 5000 Unit(s) SubCutaneous every 8 hours  hydrocortisone 1% Cream 1 Application(s) Topical daily  insulin glargine Injectable (LANTUS) 15 Unit(s) SubCutaneous at bedtime  insulin lispro (ADMELOG) corrective regimen sliding scale   SubCutaneous three times a day with meals  insulin lispro (ADMELOG) corrective regimen sliding scale   SubCutaneous at bedtime  lidocaine   4% Patch 1 Patch Transdermal <User Schedule>  lidocaine/prilocaine Cream 1 Application(s) Topical <User Schedule> PRN  melatonin 3 milliGRAM(s) Oral at bedtime  metoprolol succinate ER 12.5 milliGRAM(s) Oral at bedtime  Nephro-pawel 1 Tablet(s) Oral daily  polyethylene glycol 3350 17 Gram(s) Oral two times a day  pregabalin 75 milliGRAM(s) Oral at bedtime  senna 2 Tablet(s) Oral at bedtime  sevelamer carbonate 800 milliGRAM(s) Oral three times a day with meals  tamsulosin 0.4 milliGRAM(s) Oral at bedtime  zinc oxide 40% Paste 1 Application(s) Topical daily    A/P:    DM, HTN, CM  S/p SDH  ESRD on HD TTS  Fluid removal 1-2kg w/HD as tolerates  Bld work, Epoetin w/HD  ELLA    [phone]

## 2023-01-26 NOTE — PROGRESS NOTE ADULT - ATTENDING SUPERVISION STATEMENT
Fellow
Fellow
Resident
Fellow
Resident
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
## 2023-01-27 VITALS
DIASTOLIC BLOOD PRESSURE: 81 MMHG | OXYGEN SATURATION: 100 % | RESPIRATION RATE: 17 BRPM | SYSTOLIC BLOOD PRESSURE: 128 MMHG | TEMPERATURE: 98 F | HEART RATE: 88 BPM

## 2023-01-27 LAB
GLUCOSE BLDC GLUCOMTR-MCNC: 161 MG/DL — HIGH (ref 70–99)
GLUCOSE BLDC GLUCOMTR-MCNC: 185 MG/DL — HIGH (ref 70–99)
GLUCOSE BLDC GLUCOMTR-MCNC: 90 MG/DL — SIGNIFICANT CHANGE UP (ref 70–99)

## 2023-01-27 PROCEDURE — 85045 AUTOMATED RETICULOCYTE COUNT: CPT

## 2023-01-27 PROCEDURE — 86850 RBC ANTIBODY SCREEN: CPT

## 2023-01-27 PROCEDURE — 82746 ASSAY OF FOLIC ACID SERUM: CPT

## 2023-01-27 PROCEDURE — 92526 ORAL FUNCTION THERAPY: CPT

## 2023-01-27 PROCEDURE — 86706 HEP B SURFACE ANTIBODY: CPT

## 2023-01-27 PROCEDURE — 86704 HEP B CORE ANTIBODY TOTAL: CPT

## 2023-01-27 PROCEDURE — 92507 TX SP LANG VOICE COMM INDIV: CPT

## 2023-01-27 PROCEDURE — 81001 URINALYSIS AUTO W/SCOPE: CPT

## 2023-01-27 PROCEDURE — 99232 SBSQ HOSP IP/OBS MODERATE 35: CPT

## 2023-01-27 PROCEDURE — 87340 HEPATITIS B SURFACE AG IA: CPT

## 2023-01-27 PROCEDURE — 73560 X-RAY EXAM OF KNEE 1 OR 2: CPT

## 2023-01-27 PROCEDURE — 93970 EXTREMITY STUDY: CPT

## 2023-01-27 PROCEDURE — 92610 EVALUATE SWALLOWING FUNCTION: CPT

## 2023-01-27 PROCEDURE — 97110 THERAPEUTIC EXERCISES: CPT

## 2023-01-27 PROCEDURE — 82272 OCCULT BLD FECES 1-3 TESTS: CPT

## 2023-01-27 PROCEDURE — 99238 HOSP IP/OBS DSCHRG MGMT 30/<: CPT

## 2023-01-27 PROCEDURE — 85379 FIBRIN DEGRADATION QUANT: CPT

## 2023-01-27 PROCEDURE — 80069 RENAL FUNCTION PANEL: CPT

## 2023-01-27 PROCEDURE — 83540 ASSAY OF IRON: CPT

## 2023-01-27 PROCEDURE — 92523 SPEECH SOUND LANG COMPREHEN: CPT

## 2023-01-27 PROCEDURE — 84100 ASSAY OF PHOSPHORUS: CPT

## 2023-01-27 PROCEDURE — 36415 COLL VENOUS BLD VENIPUNCTURE: CPT

## 2023-01-27 PROCEDURE — 86900 BLOOD TYPING SEROLOGIC ABO: CPT

## 2023-01-27 PROCEDURE — 74176 CT ABD & PELVIS W/O CONTRAST: CPT

## 2023-01-27 PROCEDURE — 97535 SELF CARE MNGMENT TRAINING: CPT

## 2023-01-27 PROCEDURE — 36430 TRANSFUSION BLD/BLD COMPNT: CPT

## 2023-01-27 PROCEDURE — 83550 IRON BINDING TEST: CPT

## 2023-01-27 PROCEDURE — 86803 HEPATITIS C AB TEST: CPT

## 2023-01-27 PROCEDURE — 86923 COMPATIBILITY TEST ELECTRIC: CPT

## 2023-01-27 PROCEDURE — 97530 THERAPEUTIC ACTIVITIES: CPT

## 2023-01-27 PROCEDURE — 97129 THER IVNTJ 1ST 15 MIN: CPT

## 2023-01-27 PROCEDURE — 86901 BLOOD TYPING SEROLOGIC RH(D): CPT

## 2023-01-27 PROCEDURE — 87635 SARS-COV-2 COVID-19 AMP PRB: CPT

## 2023-01-27 PROCEDURE — 85025 COMPLETE CBC W/AUTO DIFF WBC: CPT

## 2023-01-27 PROCEDURE — 97116 GAIT TRAINING THERAPY: CPT

## 2023-01-27 PROCEDURE — P9016: CPT

## 2023-01-27 PROCEDURE — 97163 PT EVAL HIGH COMPLEX 45 MIN: CPT

## 2023-01-27 PROCEDURE — 97112 NEUROMUSCULAR REEDUCATION: CPT

## 2023-01-27 PROCEDURE — 80053 COMPREHEN METABOLIC PANEL: CPT

## 2023-01-27 PROCEDURE — 85027 COMPLETE CBC AUTOMATED: CPT

## 2023-01-27 PROCEDURE — 82728 ASSAY OF FERRITIN: CPT

## 2023-01-27 PROCEDURE — 70450 CT HEAD/BRAIN W/O DYE: CPT

## 2023-01-27 PROCEDURE — 82607 VITAMIN B-12: CPT

## 2023-01-27 PROCEDURE — 82962 GLUCOSE BLOOD TEST: CPT

## 2023-01-27 PROCEDURE — 71045 X-RAY EXAM CHEST 1 VIEW: CPT

## 2023-01-27 PROCEDURE — 97167 OT EVAL HIGH COMPLEX 60 MIN: CPT

## 2023-01-27 PROCEDURE — 99261: CPT

## 2023-01-27 PROCEDURE — 80048 BASIC METABOLIC PNL TOTAL CA: CPT

## 2023-01-27 PROCEDURE — 83036 HEMOGLOBIN GLYCOSYLATED A1C: CPT

## 2023-01-27 RX ORDER — LIDOCAINE 4 G/100G
1 CREAM TOPICAL
Refills: 0 | Status: DISCONTINUED | OUTPATIENT
Start: 2023-01-27 | End: 2023-01-27

## 2023-01-27 RX ADMIN — HEPARIN SODIUM 5000 UNIT(S): 5000 INJECTION INTRAVENOUS; SUBCUTANEOUS at 14:10

## 2023-01-27 RX ADMIN — CLOPIDOGREL BISULFATE 75 MILLIGRAM(S): 75 TABLET, FILM COATED ORAL at 12:01

## 2023-01-27 RX ADMIN — SEVELAMER CARBONATE 800 MILLIGRAM(S): 2400 POWDER, FOR SUSPENSION ORAL at 17:05

## 2023-01-27 RX ADMIN — SEVELAMER CARBONATE 800 MILLIGRAM(S): 2400 POWDER, FOR SUSPENSION ORAL at 12:03

## 2023-01-27 RX ADMIN — ZINC OXIDE 1 APPLICATION(S): 200 OINTMENT TOPICAL at 12:10

## 2023-01-27 RX ADMIN — SEVELAMER CARBONATE 800 MILLIGRAM(S): 2400 POWDER, FOR SUSPENSION ORAL at 08:52

## 2023-01-27 RX ADMIN — Medication 100 MILLIGRAM(S): at 08:52

## 2023-01-27 RX ADMIN — Medication 4: at 12:09

## 2023-01-27 RX ADMIN — Medication 1 MILLIGRAM(S): at 12:01

## 2023-01-27 RX ADMIN — Medication 4: at 17:05

## 2023-01-27 RX ADMIN — Medication 1 APPLICATION(S): at 12:02

## 2023-01-27 RX ADMIN — HEPARIN SODIUM 5000 UNIT(S): 5000 INJECTION INTRAVENOUS; SUBCUTANEOUS at 05:06

## 2023-01-27 RX ADMIN — CHLORHEXIDINE GLUCONATE 1 APPLICATION(S): 213 SOLUTION TOPICAL at 05:06

## 2023-01-27 RX ADMIN — LIDOCAINE 1 PATCH: 4 CREAM TOPICAL at 11:59

## 2023-01-27 RX ADMIN — FAMOTIDINE 10 MILLIGRAM(S): 10 INJECTION INTRAVENOUS at 12:02

## 2023-01-27 RX ADMIN — Medication 1 TABLET(S): at 12:02

## 2023-01-27 RX ADMIN — Medication 500 MILLIGRAM(S): at 12:01

## 2023-01-27 RX ADMIN — Medication 81 MILLIGRAM(S): at 12:00

## 2023-01-27 NOTE — PROGRESS NOTE ADULT - PROVIDER SPECIALTY LIST ADULT
Hospitalist
Hospitalist
Nephrology
Physiatry
Physiatry
Rehab Medicine
Rehab Medicine
Hospitalist
Nephrology
Neuropsychology
Physiatry
Rehab Medicine
Hospitalist
Nephrology
Neuropsychology
Physiatry
Rehab Medicine
Hospitalist
Physiatry

## 2023-01-27 NOTE — PROGRESS NOTE ADULT - REASON FOR ADMISSION
Traumatic SDH
TBI
Traumatic SDH
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
## 2023-01-27 NOTE — PROGRESS NOTE ADULT - SUBJECTIVE AND OBJECTIVE BOX
Seen on HD    Vital Signs Last 24 Hrs  T(C): 36.6 (01-27-23 @ 08:19), Max: 36.6 (01-27-23 @ 08:19)  T(F): 97.9 (01-27-23 @ 08:19), Max: 97.9 (01-27-23 @ 08:19)  HR: 88 (01-27-23 @ 08:19) (88 - 88)  BP: 128/81 (01-27-23 @ 08:19) (128/81 - 128/81)  RR: 17 (01-27-23 @ 08:19) (17 - 17)  SpO2: 100% (01-27-23 @ 08:19) (100% - 100%)    s1s2  b/l air entry  soft, ND  no edema                                                                                        8.8    5.12  )-----------( 286      ( 26 Jan 2023 16:45 )             27.2     26 Jan 2023 14:17    135    |  98     |  73     ----------------------------<  191    4.5     |  25     |  4.64     Ca    8.8        26 Jan 2023 14:17    TPro  6.7    /  Alb  2.9    /  TBili  0.3    /  DBili  x      /  AST  14     /  ALT  10     /  AlkPhos  92     26 Jan 2023 14:17    LIVER FUNCTIONS - ( 26 Jan 2023 14:17 )  Alb: 2.9 g/dL / Pro: 6.7 g/dL / ALK PHOS: 92 U/L / ALT: 10 U/L / AST: 14 U/L / GGT: x           acetaminophen     Tablet .. 650 milliGRAM(s) Oral every 6 hours PRN  acetaminophen     Tablet .. 975 milliGRAM(s) Oral <User Schedule>  allopurinol 100 milliGRAM(s) Oral <User Schedule>  ascorbic acid 500 milliGRAM(s) Oral daily  aspirin  chewable 81 milliGRAM(s) Oral daily  atorvastatin 80 milliGRAM(s) Oral at bedtime  chlorhexidine 2% Cloths 1 Application(s) Topical daily  clopidogrel Tablet 75 milliGRAM(s) Oral daily  dextrose 5%. 1000 milliLiter(s) IV Continuous <Continuous>  dextrose 5%. 1000 milliLiter(s) IV Continuous <Continuous>  dextrose 50% Injectable 25 Gram(s) IV Push once  dextrose 50% Injectable 25 Gram(s) IV Push once  dextrose 50% Injectable 12.5 Gram(s) IV Push once  dextrose Oral Gel 15 Gram(s) Oral once PRN  epoetin wayne-epbx (RETACRIT) Injectable 91651 Unit(s) IV Push <User Schedule>  famotidine    Tablet 10 milliGRAM(s) Oral daily  folic acid 1 milliGRAM(s) Oral daily  glucagon  Injectable 1 milliGRAM(s) IntraMuscular once  heparin   Injectable 5000 Unit(s) SubCutaneous every 8 hours  hydrocortisone 1% Cream 1 Application(s) Topical daily  insulin glargine Injectable (LANTUS) 15 Unit(s) SubCutaneous at bedtime  insulin lispro (ADMELOG) corrective regimen sliding scale   SubCutaneous three times a day with meals  insulin lispro (ADMELOG) corrective regimen sliding scale   SubCutaneous at bedtime  lidocaine   4% Patch 1 Patch Transdermal <User Schedule>  lidocaine   4% Patch 1 Patch Transdermal <User Schedule>  lidocaine/prilocaine Cream 1 Application(s) Topical <User Schedule> PRN  melatonin 3 milliGRAM(s) Oral at bedtime  metoprolol succinate ER 12.5 milliGRAM(s) Oral at bedtime  Nephro-pawel 1 Tablet(s) Oral daily  polyethylene glycol 3350 17 Gram(s) Oral two times a day  pregabalin 75 milliGRAM(s) Oral at bedtime  senna 2 Tablet(s) Oral at bedtime  sevelamer carbonate 800 milliGRAM(s) Oral three times a day with meals  tamsulosin 0.4 milliGRAM(s) Oral at bedtime  zinc oxide 40% Paste 1 Application(s) Topical daily    A/P:    DM, HTN, CM  S/p SDH  ESRD on HD TTS  Epoetin w/HD  Pt will continue HD at [facility] as per his usual TTS schedule    [phone]
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
## 2023-01-27 NOTE — PROGRESS NOTE ADULT - SUBJECTIVE AND OBJECTIVE BOX
Patient is a 62y old  Male who presents with a chief complaint of Traumatic SDH (26 Jan 2023 16:51)      24 HOUR EVENTS:  No overnight events reported.     SUBJECTIVE:  Patient seen and examined at bedside. He c/o pain in his right knee. He is able to fully extend/flex it. He denies swelling. He denies any injury to it. He said he woke up with this discomfort out of the blue two days ago.  denies f/c/n/v/d/abd pain/cp/sob.    ALLERGIES:  No Known Drug Allergies  shellfish (Unknown)    MEDICATIONS  (STANDING):  acetaminophen     Tablet .. 975 milliGRAM(s) Oral <User Schedule>  allopurinol 100 milliGRAM(s) Oral <User Schedule>  ascorbic acid 500 milliGRAM(s) Oral daily  aspirin  chewable 81 milliGRAM(s) Oral daily  atorvastatin 80 milliGRAM(s) Oral at bedtime  chlorhexidine 2% Cloths 1 Application(s) Topical daily  clopidogrel Tablet 75 milliGRAM(s) Oral daily  dextrose 5%. 1000 milliLiter(s) (100 mL/Hr) IV Continuous <Continuous>  dextrose 5%. 1000 milliLiter(s) (50 mL/Hr) IV Continuous <Continuous>  dextrose 50% Injectable 25 Gram(s) IV Push once  dextrose 50% Injectable 12.5 Gram(s) IV Push once  dextrose 50% Injectable 25 Gram(s) IV Push once  epoetin wayne-epbx (RETACRIT) Injectable 19020 Unit(s) IV Push <User Schedule>  famotidine    Tablet 10 milliGRAM(s) Oral daily  folic acid 1 milliGRAM(s) Oral daily  glucagon  Injectable 1 milliGRAM(s) IntraMuscular once  heparin   Injectable 5000 Unit(s) SubCutaneous every 8 hours  hydrocortisone 1% Cream 1 Application(s) Topical daily  insulin glargine Injectable (LANTUS) 15 Unit(s) SubCutaneous at bedtime  insulin lispro (ADMELOG) corrective regimen sliding scale   SubCutaneous at bedtime  insulin lispro (ADMELOG) corrective regimen sliding scale   SubCutaneous three times a day with meals  lidocaine   4% Patch 1 Patch Transdermal <User Schedule>  lidocaine   4% Patch 1 Patch Transdermal <User Schedule>  melatonin 3 milliGRAM(s) Oral at bedtime  metoprolol succinate ER 12.5 milliGRAM(s) Oral at bedtime  Nephro-pawel 1 Tablet(s) Oral daily  polyethylene glycol 3350 17 Gram(s) Oral two times a day  pregabalin 75 milliGRAM(s) Oral at bedtime  senna 2 Tablet(s) Oral at bedtime  sevelamer carbonate 800 milliGRAM(s) Oral three times a day with meals  tamsulosin 0.4 milliGRAM(s) Oral at bedtime  zinc oxide 40% Paste 1 Application(s) Topical daily    MEDICATIONS  (PRN):  acetaminophen     Tablet .. 650 milliGRAM(s) Oral every 6 hours PRN Temp greater or equal to 38C (100.4F), Mild Pain (1 - 3)  dextrose Oral Gel 15 Gram(s) Oral once PRN Blood Glucose LESS THAN 70 milliGRAM(s)/deciliter  lidocaine/prilocaine Cream 1 Application(s) Topical <User Schedule> PRN on dialysis days    Vital Signs Last 24 Hrs  T(F): 97.9 (27 Jan 2023 08:19), Max: 98.5 (26 Jan 2023 20:04)  HR: 88 (27 Jan 2023 08:19) (82 - 100)  BP: 128/81 (27 Jan 2023 08:19) (128/81 - 171/83)  RR: 17 (27 Jan 2023 08:19) (16 - 17)  SpO2: 100% (27 Jan 2023 08:19) (99% - 100%)  I&O's Summary    26 Jan 2023 07:01  -  27 Jan 2023 07:00  --------------------------------------------------------  IN: 800 mL / OUT: 2800 mL / NET: -2000 mL      PHYSICAL EXAM:  General: NAD, Awake and alert  ENT: Moist mucous membranes, no thrush  Neck: Supple, No JVD  Lungs: Clear to auscultation bilaterally, good air entry, non-labored breathing  Cardio: RRR, S1/S2, No murmur  Abdomen: Soft, Nontender, Nondistended; Bowel sounds present  Extremities: No calf tenderness, No pitting edema, no contractures.  right knee w/ full capacity for flexion/extension. no swelling, erythema, warmth.     LABS:                        8.8    5.12  )-----------( 286      ( 26 Jan 2023 16:45 )             27.2     01-26    135  |  98  |  73  ----------------------------<  191  4.5   |  25  |  4.64    Ca    8.8      26 Jan 2023 14:17    TPro  6.7  /  Alb  2.9  /  TBili  0.3  /  DBili  x   /  AST  14  /  ALT  10  /  AlkPhos  92  01-26      POCT Blood Glucose.: 161 mg/dL (27 Jan 2023 12:07)  POCT Blood Glucose.: 90 mg/dL (27 Jan 2023 08:23)  POCT Blood Glucose.: 207 mg/dL (26 Jan 2023 21:09)  POCT Blood Glucose.: 119 mg/dL (26 Jan 2023 17:27)          COVID-19 PCR: NotDetec (01-25-23 @ 15:50)  COVID-19 PCR: NotDetec (12-30-22 @ 09:48)    RADIOLOGY & ADDITIONAL TESTS:    Care Discussed with Consultants/Other Providers:   dr. talley

## 2023-02-13 NOTE — PATIENT PROFILE ADULT - NSPROGENPREVTRANSF_GEN_A_NUR
-Will call in 3 days to check in with patient  -Continue with symptomatic care  -Return if symptoms worsen no history of blood product transfusion

## 2023-02-17 NOTE — DISCHARGE NOTE NURSING/CASE MANAGEMENT/SOCIAL WORK - NSDCPETBCESMAN_GEN_ALL_CORE
112 If you are a smoker, it is important for your health to stop smoking. Please be aware that second hand smoke is also harmful.

## 2023-02-24 NOTE — DIETITIAN INITIAL EVALUATION ADULT. - OBTAIN DAILY WEIGHT
Anesthesia Post-op Note      Patient: Rosibel Bennett  ANESTHESIA:  Monitor Anesthesia Care   ANESTHESIOLOGIST:  Anesthesiologist: Nolan Nobles MD  Procedure(s) Performed: ENDOSCOPIC ULTRASOUND    Vital Last Value   Temperature 36.2 °C (97.2 °F) (02/24/23 1040)   Pulse (!) 54 (02/24/23 1110)   Respiratory 15 (02/24/23 1110)   Non-Invasive  Blood Pressure (!) 140/77 (02/24/23 1110)   Arterial   Blood Pressure     Pulse Oximetry 93 % (02/24/23 1110)       Post-op vital signs: stable.  Level of Consciousness: participates in exam, answers questions appropriately, awake and oriented.  Respiratory: unassisted, spontaneous ventilation  Cardiovascular: stable and blood pressure at baseline  Hydration: euvolemic  Post-op pain: adequately controlled   Nausea: None  Airway patency: patent  Post-op assessment: No apparent anesthetic complications.  Complications: None.    Comments:   
yes

## 2023-02-24 NOTE — ASU DISCHARGE PLAN (ADULT/PEDIATRIC). - NS AS DC DISCHARGE ACCOMPANY
[Family

## 2023-03-02 ENCOUNTER — OUTPATIENT (OUTPATIENT)
Dept: OUTPATIENT SERVICES | Facility: HOSPITAL | Age: 63
LOS: 1 days | End: 2023-03-02
Payer: COMMERCIAL

## 2023-03-02 ENCOUNTER — APPOINTMENT (OUTPATIENT)
Dept: RADIOLOGY | Facility: CLINIC | Age: 63
End: 2023-03-02
Payer: COMMERCIAL

## 2023-03-02 DIAGNOSIS — I50.9 HEART FAILURE, UNSPECIFIED: Chronic | ICD-10-CM

## 2023-03-02 DIAGNOSIS — E11.319 TYPE 2 DIABETES MELLITUS WITH UNSPECIFIED DIABETIC RETINOPATHY WITHOUT MACULAR EDEMA: Chronic | ICD-10-CM

## 2023-03-02 DIAGNOSIS — Z95.5 PRESENCE OF CORONARY ANGIOPLASTY IMPLANT AND GRAFT: Chronic | ICD-10-CM

## 2023-03-02 DIAGNOSIS — Z00.8 ENCOUNTER FOR OTHER GENERAL EXAMINATION: ICD-10-CM

## 2023-03-02 DIAGNOSIS — Z98.89 OTHER SPECIFIED POSTPROCEDURAL STATES: Chronic | ICD-10-CM

## 2023-03-02 PROCEDURE — 73630 X-RAY EXAM OF FOOT: CPT | Mod: 26,LT

## 2023-03-02 PROCEDURE — 73630 X-RAY EXAM OF FOOT: CPT

## 2023-03-07 ENCOUNTER — APPOINTMENT (OUTPATIENT)
Dept: ENDOCRINOLOGY | Facility: CLINIC | Age: 63
End: 2023-03-07

## 2023-03-08 ENCOUNTER — APPOINTMENT (OUTPATIENT)
Dept: WOUND CARE | Facility: CLINIC | Age: 63
End: 2023-03-08
Payer: COMMERCIAL

## 2023-03-08 VITALS — TEMPERATURE: 97.8 F

## 2023-03-08 PROCEDURE — 11042 DBRDMT SUBQ TIS 1ST 20SQCM/<: CPT

## 2023-03-08 PROCEDURE — 99205 OFFICE O/P NEW HI 60 MIN: CPT | Mod: 25

## 2023-03-08 RX ORDER — ELECTROLYTES/DEXTROSE
SOLUTION, ORAL ORAL
Refills: 0 | Status: ACTIVE | COMMUNITY

## 2023-03-08 RX ORDER — COLCHICINE 0.6 MG/1
0.6 TABLET, FILM COATED ORAL
Qty: 30 | Refills: 2 | Status: DISCONTINUED | COMMUNITY
Start: 2018-08-25 | End: 2023-03-08

## 2023-03-08 NOTE — PHYSICAL EXAM
[Skin Ulcer] : ulcer [Alert] : alert [Oriented to Person] : oriented to person [Oriented to Place] : oriented to place [Oriented to Time] : oriented to time [Calm] : calm [Please See PDF for Tissue Analytics] : Please See PDF for [employer] Analytics. [Ankle Swelling (On Exam)] : not present [de-identified] : grimacing in pain, well groomed male

## 2023-03-08 NOTE — PLAN
[FreeTextEntry1] : 3/8/23\par Plan - culture obtained\par santyl renewed to pharmacy\par tramadol dose adjusted to pharmacy for pain\par shower wound, santyl/gauze/foam/john, offload area\par supplies ordered\par follow up 1 week\par follow up vascular surgery regarding LLE

## 2023-03-08 NOTE — REVIEW OF SYSTEMS
[Recent Weight Loss (___ Lbs)] : recent [unfilled] ~Ulb weight loss [Joint Swelling] : joint swelling [Joint Stiffness] : joint stiffness [Skin Wound] : skin wound [As Noted in HPI] : as noted in HPI [Negative] : Psychiatric [FreeTextEntry2] : started 10/22 dialysis [FreeTextEntry3] : vision issues secondary to diabetes [FreeTextEntry5] : s/p multiple coronary stents [FreeTextEntry8] : dialysis [FreeTextEntry9] : ROULA [de-identified] : poorly controlled in past
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
## 2023-03-14 ENCOUNTER — INPATIENT (INPATIENT)
Facility: HOSPITAL | Age: 63
LOS: 5 days | Discharge: HOME | DRG: 865 | End: 2023-03-20
Attending: INTERNAL MEDICINE | Admitting: INTERNAL MEDICINE
Payer: COMMERCIAL

## 2023-03-14 VITALS
WEIGHT: 186.95 LBS | DIASTOLIC BLOOD PRESSURE: 57 MMHG | RESPIRATION RATE: 20 BRPM | HEART RATE: 98 BPM | HEIGHT: 67 IN | SYSTOLIC BLOOD PRESSURE: 113 MMHG | TEMPERATURE: 100 F | OXYGEN SATURATION: 95 %

## 2023-03-14 DIAGNOSIS — Z98.89 OTHER SPECIFIED POSTPROCEDURAL STATES: Chronic | ICD-10-CM

## 2023-03-14 DIAGNOSIS — E11.319 TYPE 2 DIABETES MELLITUS WITH UNSPECIFIED DIABETIC RETINOPATHY WITHOUT MACULAR EDEMA: Chronic | ICD-10-CM

## 2023-03-14 DIAGNOSIS — I50.9 HEART FAILURE, UNSPECIFIED: Chronic | ICD-10-CM

## 2023-03-14 DIAGNOSIS — R50.9 FEVER, UNSPECIFIED: ICD-10-CM

## 2023-03-14 DIAGNOSIS — Z95.5 PRESENCE OF CORONARY ANGIOPLASTY IMPLANT AND GRAFT: Chronic | ICD-10-CM

## 2023-03-14 LAB
ALBUMIN SERPL ELPH-MCNC: 3.9 G/DL — SIGNIFICANT CHANGE UP (ref 3.3–5)
ALP SERPL-CCNC: 101 U/L — SIGNIFICANT CHANGE UP (ref 40–120)
ALT FLD-CCNC: 8 U/L — LOW (ref 10–45)
ANION GAP SERPL CALC-SCNC: 11 MMOL/L — SIGNIFICANT CHANGE UP (ref 5–17)
APPEARANCE UR: CLEAR — SIGNIFICANT CHANGE UP
APTT BLD: 32.6 SEC — SIGNIFICANT CHANGE UP (ref 27.5–35.5)
AST SERPL-CCNC: 7 U/L — LOW (ref 10–40)
BACTERIA # UR AUTO: NEGATIVE — SIGNIFICANT CHANGE UP
BACTERIA SPEC CULT: ABNORMAL
BASE EXCESS BLDV CALC-SCNC: 2.2 MMOL/L — SIGNIFICANT CHANGE UP (ref -2–3)
BASOPHILS # BLD AUTO: 0.05 K/UL — SIGNIFICANT CHANGE UP (ref 0–0.2)
BASOPHILS NFR BLD AUTO: 0.5 % — SIGNIFICANT CHANGE UP (ref 0–2)
BILIRUB SERPL-MCNC: 0.3 MG/DL — SIGNIFICANT CHANGE UP (ref 0.2–1.2)
BILIRUB UR-MCNC: NEGATIVE — SIGNIFICANT CHANGE UP
BUN SERPL-MCNC: 34 MG/DL — HIGH (ref 7–23)
CA-I SERPL-SCNC: 1.16 MMOL/L — SIGNIFICANT CHANGE UP (ref 1.15–1.33)
CALCIUM SERPL-MCNC: 8.9 MG/DL — SIGNIFICANT CHANGE UP (ref 8.4–10.5)
CHLORIDE BLDV-SCNC: 101 MMOL/L — SIGNIFICANT CHANGE UP (ref 96–108)
CHLORIDE SERPL-SCNC: 99 MMOL/L — SIGNIFICANT CHANGE UP (ref 96–108)
CO2 BLDV-SCNC: 29 MMOL/L — HIGH (ref 22–26)
CO2 SERPL-SCNC: 27 MMOL/L — SIGNIFICANT CHANGE UP (ref 22–31)
COLOR SPEC: SIGNIFICANT CHANGE UP
CREAT SERPL-MCNC: 3.86 MG/DL — HIGH (ref 0.5–1.3)
DIFF PNL FLD: NEGATIVE — SIGNIFICANT CHANGE UP
EGFR: 17 ML/MIN/1.73M2 — LOW
EOSINOPHIL # BLD AUTO: 0.14 K/UL — SIGNIFICANT CHANGE UP (ref 0–0.5)
EOSINOPHIL NFR BLD AUTO: 1.5 % — SIGNIFICANT CHANGE UP (ref 0–6)
EPI CELLS # UR: 0 /HPF — SIGNIFICANT CHANGE UP
GAS PNL BLDV: 135 MMOL/L — LOW (ref 136–145)
GAS PNL BLDV: SIGNIFICANT CHANGE UP
GLUCOSE BLDV-MCNC: 275 MG/DL — HIGH (ref 70–99)
GLUCOSE SERPL-MCNC: 281 MG/DL — HIGH (ref 70–99)
GLUCOSE UR QL: ABNORMAL
HCO3 BLDV-SCNC: 27 MMOL/L — SIGNIFICANT CHANGE UP (ref 22–29)
HCT VFR BLD CALC: 28.6 % — LOW (ref 39–50)
HCT VFR BLDA CALC: 28 % — LOW (ref 39–51)
HGB BLD CALC-MCNC: 9.2 G/DL — LOW (ref 12.6–17.4)
HGB BLD-MCNC: 9.1 G/DL — LOW (ref 13–17)
HYALINE CASTS # UR AUTO: 1 /LPF — SIGNIFICANT CHANGE UP (ref 0–2)
IMM GRANULOCYTES NFR BLD AUTO: 0.4 % — SIGNIFICANT CHANGE UP (ref 0–0.9)
INR BLD: 1.09 RATIO — SIGNIFICANT CHANGE UP (ref 0.88–1.16)
KETONES UR-MCNC: NEGATIVE — SIGNIFICANT CHANGE UP
LACTATE BLDV-MCNC: 1.6 MMOL/L — SIGNIFICANT CHANGE UP (ref 0.5–2)
LEUKOCYTE ESTERASE UR-ACNC: NEGATIVE — SIGNIFICANT CHANGE UP
LYMPHOCYTES # BLD AUTO: 0.95 K/UL — LOW (ref 1–3.3)
LYMPHOCYTES # BLD AUTO: 10.1 % — LOW (ref 13–44)
MCHC RBC-ENTMCNC: 29.6 PG — SIGNIFICANT CHANGE UP (ref 27–34)
MCHC RBC-ENTMCNC: 31.8 GM/DL — LOW (ref 32–36)
MCV RBC AUTO: 93.2 FL — SIGNIFICANT CHANGE UP (ref 80–100)
MONOCYTES # BLD AUTO: 0.71 K/UL — SIGNIFICANT CHANGE UP (ref 0–0.9)
MONOCYTES NFR BLD AUTO: 7.5 % — SIGNIFICANT CHANGE UP (ref 2–14)
NEUTROPHILS # BLD AUTO: 7.53 K/UL — HIGH (ref 1.8–7.4)
NEUTROPHILS NFR BLD AUTO: 80 % — HIGH (ref 43–77)
NITRITE UR-MCNC: NEGATIVE — SIGNIFICANT CHANGE UP
NRBC # BLD: 0 /100 WBCS — SIGNIFICANT CHANGE UP (ref 0–0)
PCO2 BLDV: 44 MMHG — SIGNIFICANT CHANGE UP (ref 42–55)
PH BLDV: 7.4 — SIGNIFICANT CHANGE UP (ref 7.32–7.43)
PH UR: 6.5 — SIGNIFICANT CHANGE UP (ref 5–8)
PLATELET # BLD AUTO: 162 K/UL — SIGNIFICANT CHANGE UP (ref 150–400)
PO2 BLDV: 55 MMHG — HIGH (ref 25–45)
POTASSIUM BLDV-SCNC: 3.4 MMOL/L — LOW (ref 3.5–5.1)
POTASSIUM SERPL-MCNC: 3.5 MMOL/L — SIGNIFICANT CHANGE UP (ref 3.5–5.3)
POTASSIUM SERPL-SCNC: 3.5 MMOL/L — SIGNIFICANT CHANGE UP (ref 3.5–5.3)
PROT SERPL-MCNC: 6.2 G/DL — SIGNIFICANT CHANGE UP (ref 6–8.3)
PROT UR-MCNC: ABNORMAL
PROTHROM AB SERPL-ACNC: 12.6 SEC — SIGNIFICANT CHANGE UP (ref 10.5–13.4)
RAPID RVP RESULT: DETECTED
RBC # BLD: 3.07 M/UL — LOW (ref 4.2–5.8)
RBC # FLD: 16.2 % — HIGH (ref 10.3–14.5)
RBC CASTS # UR COMP ASSIST: 4 /HPF — SIGNIFICANT CHANGE UP (ref 0–4)
RV+EV RNA SPEC QL NAA+PROBE: DETECTED
SAO2 % BLDV: 86.8 % — SIGNIFICANT CHANGE UP (ref 67–88)
SARS-COV-2 RNA SPEC QL NAA+PROBE: SIGNIFICANT CHANGE UP
SODIUM SERPL-SCNC: 137 MMOL/L — SIGNIFICANT CHANGE UP (ref 135–145)
SP GR SPEC: 1.01 — SIGNIFICANT CHANGE UP (ref 1.01–1.02)
UROBILINOGEN FLD QL: NEGATIVE — SIGNIFICANT CHANGE UP
WBC # BLD: 9.42 K/UL — SIGNIFICANT CHANGE UP (ref 3.8–10.5)
WBC # FLD AUTO: 9.42 K/UL — SIGNIFICANT CHANGE UP (ref 3.8–10.5)
WBC UR QL: 1 /HPF — SIGNIFICANT CHANGE UP (ref 0–5)

## 2023-03-14 PROCEDURE — 73630 X-RAY EXAM OF FOOT: CPT | Mod: 26,LT

## 2023-03-14 PROCEDURE — 71045 X-RAY EXAM CHEST 1 VIEW: CPT | Mod: 26

## 2023-03-14 PROCEDURE — 73610 X-RAY EXAM OF ANKLE: CPT | Mod: 26,LT

## 2023-03-14 PROCEDURE — 99285 EMERGENCY DEPT VISIT HI MDM: CPT

## 2023-03-14 RX ORDER — ALLOPURINOL 300 MG
100 TABLET ORAL AT BEDTIME
Refills: 0 | Status: DISCONTINUED | OUTPATIENT
Start: 2023-03-14 | End: 2023-03-20

## 2023-03-14 RX ORDER — SODIUM CHLORIDE 9 MG/ML
1000 INJECTION, SOLUTION INTRAVENOUS
Refills: 0 | Status: DISCONTINUED | OUTPATIENT
Start: 2023-03-14 | End: 2023-03-20

## 2023-03-14 RX ORDER — ASPIRIN/CALCIUM CARB/MAGNESIUM 324 MG
81 TABLET ORAL DAILY
Refills: 0 | Status: DISCONTINUED | OUTPATIENT
Start: 2023-03-14 | End: 2023-03-20

## 2023-03-14 RX ORDER — ACETAMINOPHEN 500 MG
650 TABLET ORAL ONCE
Refills: 0 | Status: COMPLETED | OUTPATIENT
Start: 2023-03-14 | End: 2023-03-14

## 2023-03-14 RX ORDER — VANCOMYCIN HCL 1 G
1000 VIAL (EA) INTRAVENOUS ONCE
Refills: 0 | Status: COMPLETED | OUTPATIENT
Start: 2023-03-14 | End: 2023-03-14

## 2023-03-14 RX ORDER — CLOPIDOGREL BISULFATE 75 MG/1
75 TABLET, FILM COATED ORAL DAILY
Refills: 0 | Status: DISCONTINUED | OUTPATIENT
Start: 2023-03-14 | End: 2023-03-20

## 2023-03-14 RX ORDER — ATORVASTATIN CALCIUM 80 MG/1
80 TABLET, FILM COATED ORAL AT BEDTIME
Refills: 0 | Status: DISCONTINUED | OUTPATIENT
Start: 2023-03-14 | End: 2023-03-20

## 2023-03-14 RX ORDER — PIPERACILLIN AND TAZOBACTAM 4; .5 G/20ML; G/20ML
3.38 INJECTION, POWDER, LYOPHILIZED, FOR SOLUTION INTRAVENOUS EVERY 12 HOURS
Refills: 0 | Status: DISCONTINUED | OUTPATIENT
Start: 2023-03-14 | End: 2023-03-18

## 2023-03-14 RX ORDER — METOPROLOL TARTRATE 50 MG
25 TABLET ORAL DAILY
Refills: 0 | Status: DISCONTINUED | OUTPATIENT
Start: 2023-03-14 | End: 2023-03-15

## 2023-03-14 RX ORDER — SEVELAMER CARBONATE 2400 MG/1
800 POWDER, FOR SUSPENSION ORAL
Refills: 0 | Status: DISCONTINUED | OUTPATIENT
Start: 2023-03-14 | End: 2023-03-20

## 2023-03-14 RX ORDER — ACETAMINOPHEN 500 MG
650 TABLET ORAL EVERY 6 HOURS
Refills: 0 | Status: DISCONTINUED | OUTPATIENT
Start: 2023-03-14 | End: 2023-03-20

## 2023-03-14 RX ORDER — DEXTROSE 50 % IN WATER 50 %
15 SYRINGE (ML) INTRAVENOUS ONCE
Refills: 0 | Status: DISCONTINUED | OUTPATIENT
Start: 2023-03-14 | End: 2023-03-20

## 2023-03-14 RX ORDER — DEXTROSE 50 % IN WATER 50 %
12.5 SYRINGE (ML) INTRAVENOUS ONCE
Refills: 0 | Status: DISCONTINUED | OUTPATIENT
Start: 2023-03-14 | End: 2023-03-20

## 2023-03-14 RX ORDER — DEXTROSE 50 % IN WATER 50 %
25 SYRINGE (ML) INTRAVENOUS ONCE
Refills: 0 | Status: DISCONTINUED | OUTPATIENT
Start: 2023-03-14 | End: 2023-03-20

## 2023-03-14 RX ORDER — LANOLIN ALCOHOL/MO/W.PET/CERES
3 CREAM (GRAM) TOPICAL AT BEDTIME
Refills: 0 | Status: DISCONTINUED | OUTPATIENT
Start: 2023-03-14 | End: 2023-03-20

## 2023-03-14 RX ORDER — INSULIN LISPRO 100/ML
VIAL (ML) SUBCUTANEOUS
Refills: 0 | Status: DISCONTINUED | OUTPATIENT
Start: 2023-03-14 | End: 2023-03-20

## 2023-03-14 RX ORDER — SENNA PLUS 8.6 MG/1
2 TABLET ORAL AT BEDTIME
Refills: 0 | Status: DISCONTINUED | OUTPATIENT
Start: 2023-03-14 | End: 2023-03-20

## 2023-03-14 RX ORDER — FOLIC ACID 0.8 MG
1 TABLET ORAL DAILY
Refills: 0 | Status: DISCONTINUED | OUTPATIENT
Start: 2023-03-14 | End: 2023-03-20

## 2023-03-14 RX ORDER — ASCORBIC ACID 60 MG
500 TABLET,CHEWABLE ORAL DAILY
Refills: 0 | Status: DISCONTINUED | OUTPATIENT
Start: 2023-03-14 | End: 2023-03-20

## 2023-03-14 RX ORDER — HEPARIN SODIUM 5000 [USP'U]/ML
5000 INJECTION INTRAVENOUS; SUBCUTANEOUS EVERY 12 HOURS
Refills: 0 | Status: DISCONTINUED | OUTPATIENT
Start: 2023-03-14 | End: 2023-03-20

## 2023-03-14 RX ORDER — GLUCAGON INJECTION, SOLUTION 0.5 MG/.1ML
1 INJECTION, SOLUTION SUBCUTANEOUS ONCE
Refills: 0 | Status: DISCONTINUED | OUTPATIENT
Start: 2023-03-14 | End: 2023-03-20

## 2023-03-14 RX ORDER — FAMOTIDINE 10 MG/ML
10 INJECTION INTRAVENOUS DAILY
Refills: 0 | Status: DISCONTINUED | OUTPATIENT
Start: 2023-03-14 | End: 2023-03-20

## 2023-03-14 RX ORDER — TAMSULOSIN HYDROCHLORIDE 0.4 MG/1
0.4 CAPSULE ORAL AT BEDTIME
Refills: 0 | Status: DISCONTINUED | OUTPATIENT
Start: 2023-03-14 | End: 2023-03-20

## 2023-03-14 RX ORDER — PIPERACILLIN AND TAZOBACTAM 4; .5 G/20ML; G/20ML
3.38 INJECTION, POWDER, LYOPHILIZED, FOR SOLUTION INTRAVENOUS ONCE
Refills: 0 | Status: COMPLETED | OUTPATIENT
Start: 2023-03-14 | End: 2023-03-14

## 2023-03-14 RX ADMIN — PIPERACILLIN AND TAZOBACTAM 3.38 GRAM(S): 4; .5 INJECTION, POWDER, LYOPHILIZED, FOR SOLUTION INTRAVENOUS at 19:18

## 2023-03-14 RX ADMIN — Medication 250 MILLIGRAM(S): at 14:36

## 2023-03-14 RX ADMIN — Medication 1000 MILLIGRAM(S): at 19:18

## 2023-03-14 RX ADMIN — PIPERACILLIN AND TAZOBACTAM 200 GRAM(S): 4; .5 INJECTION, POWDER, LYOPHILIZED, FOR SOLUTION INTRAVENOUS at 13:54

## 2023-03-14 RX ADMIN — Medication 650 MILLIGRAM(S): at 20:12

## 2023-03-14 NOTE — H&P ADULT - NSHPLABSRESULTS_GEN_ALL_CORE
< from: CT Abdomen and Pelvis No Cont (01.13.23 @ 12:28) >    No retroperitoneal hematoma.    Cholelithiasis. If acute cholecystitis is of clinical concern, recommend   abdominal ultrasound.    < from: CT Head No Cont (01.13.23 @ 12:28) >    IMPRESSION:  Small right temporal convexity chronic subdural hematoma,   smaller in size. Mild chronic microvascular changes without evidence of   an acute transcortical infarction or acute hemorrhage.

## 2023-03-14 NOTE — H&P ADULT - ASSESSMENT
62-year-old male patient past medical history of yet end-stage renal disease on dialysis MWF, CAD status post stents, CHF, cardiomyopathy, diabetes who presents to the ED for fever that began this morning.  Per wife, patient has been experiencing an unsteady gait since this AM.  Yesterday's HD session ended a little bit early than normal given patient was feeling unwell, and was found hypotensive.  Fever today was 101.4 at home and wife gave Tylenol at the time.  Patient with left heel wound that has been taking care off and dressed daily by wife.  Endorsing mild drainage for the past week, and was seen by podiatry and found with positive heel culture.  pt was seen in my office with L heel eschar sent to wound care  podiatry appreciated  wound consult in am  no abx as per cardiology  culture has sent  s/p recent stent continue all cardiac meds  esrd on hd  htn, continue  blocker  physical therapy

## 2023-03-14 NOTE — ED PROVIDER NOTE - OBJECTIVE STATEMENT
62-year-old male patient past medical history of yet end-stage renal disease on dialysis MWF, CAD status post stents, CHF, cardiomyopathy, diabetes who presents to the ED for fever that began this morning.  Per wife, patient has been experiencing an unsteady gait since this AM.  Yesterday's HD session ended a little bit early than normal given patient was feeling unwell, and was found hypotensive.  Fever today was 101.4 at home and wife gave Tylenol at the time.  Patient with left heel wound that has been taking care off and dressed daily by wife.  Endorsing mild drainage for the past week, and was seen by podiatry and found with positive heel culture.

## 2023-03-14 NOTE — CONSULT NOTE ADULT - ASSESSMENT
62M with left heel wound  - pt seen and evaluated  - afebrile, no leukocytosis  - left heel wound w central eschar, no bogginess, no signs of infection. right heel with no open wounds or lesions  - no indication for wound culture  - xrays: no gas, no OM  - no indication for admission from podiatry standpoint  - pod stable for discharge on PO Keflex x 10 days  - discussed w attending

## 2023-03-14 NOTE — ED PROVIDER NOTE - ATTENDING CONTRIBUTION TO CARE
Attending MD Mcneil:  I personally have seen and examined this patient. I have performed a substantive portion of the visit including all aspects of the medical decision making.  Resident note reviewed and agree on plan of care and except where noted.      62-year-old gentleman with a history of end-stage renal disease on hemodialysis Monday Wednesday Friday, CAD, CHF, diabetes presenting for fever for 1 day.  Patient has a known left heel wound that has been present for some time and has been monitored as an outpatient with wound care.  Patient denies any cough no diarrhea no redness or swelling around the fistula site.  Patient received hemodialysis yesterday but it was discontinued 5 minutes towards the end due potentially to low blood pressure.    Vital signs notable for low-grade tachycardia heart rate 98, oral temperature is 99.9, normotensive blood pressure 113 systolic otherwise nonactionable.  Patient sitting in the stretcher no apparent distress.  Clear lungs anteriorly.  Regular heart sounds without obvious murmur.  Abdomen soft nontender nondistended.  Left lower extremity: Left heel with approximately 5 cm x 4 cm partial-thickness ulceration.  No significant surrounding erythema warmth or tenderness.  DP pulse present in left foot.  Left lower extremities warm and well-perfused.    Patient with fever for 1 day in the setting of known left heel ulcer.  No other localizing signs or symptoms referable to patient's fever.  Primary concern would be possibly for sepsis from left foot wound.  Will obtain labs including panculture x-ray of left foot and ankle empiric antibiotics, podiatry consultation and admission.        *The above represents an initial assessment/impression. Please refer to progress notes for potential changes in patient clinical course*

## 2023-03-14 NOTE — ED PROVIDER NOTE - CLINICAL SUMMARY MEDICAL DECISION MAKING FREE TEXT BOX
62-year-old male patient who presents to the ED for fevers and chronic left heel wound recently found to have grown staph.  Patient had HD yesterday and was unable to finish full session given feeling unwell.  On exam, no signs of active drainage or purulence from heel wound.  Will assess with blood work, x-rays.  Concerning for infected heel wound versus osteomyelitis.  Will begin antibiotics.

## 2023-03-14 NOTE — H&P ADULT - HISTORY OF PRESENT ILLNESS
CHIEF COMPLAINT:Patient is a 62y old  Male who presents with a chief complaint of     HPI:  62-year-old male patient past medical history of yet end-stage renal disease on dialysis MWF, CAD status post stents, CHF, cardiomyopathy, diabetes who presents to the ED for fever that began this morning.  Per wife, patient has been experiencing an unsteady gait since this AM.  Yesterday's HD session ended a little bit early than normal given patient was feeling unwell, and was found hypotensive.  Fever today was 101.4 at home and wife gave Tylenol at the time.  Patient with left heel wound that has been taking care off and dressed daily by wife.  Endorsing mild drainage for the past week, and was seen by podiatry and found with positive heel culture.    PAST MEDICAL & SURGICAL HISTORY:  Diabetes Mellitus Type II, Uncontrolled  Dyslipidemia  s/p Angioplasty with Stent  HTN (hypertension)  Gout  Diabetic retinopathy  GERD (gastroesophageal reflux disease)  Nephrolithiasis  Vitamin D deficiency  Hepatitis  CKD (chronic kidney disease)  Ischemic cardiomyopathy  ASHD (arteriosclerotic heart disease)  Pulmonary hypertension  Myocardial infarction  CAD (coronary artery disease)  BPH (benign prostatic hyperplasia)  Retinal Hemorrhage  S/P coronary artery stent placement  2010 TRICIA to Diag ; 11/18/2010  LAD; 2/4/11 ATOM liberte Diag; 5/15/2017  TRICIA to 1st diag; 6/7/17 PCI with laser and high pressure balloon  History of eye surgery  left eye  H/O lithotripsy  Diabetes with retinopathy  S/P PPV/PRP/GAS  OD 2/22/17 for viterous hemorrhage  CHF with cardiomyopathy  Insertion of Cardiomems monitor  Stented coronary artery    MEDICATIONS  (STANDING):  noted  MEDICATIONS  (PRN):      FAMILY HISTORY:  Family history of cardiac disorder in father (Father)    SOCIAL HISTORY:    [x ] Non-smoker  [ ] Smoker  [ ] Alcohol    Allergies    No Known Drug Allergies  shellfish (Unknown)    Intolerances    	    REVIEW OF SYSTEMS:  CONSTITUTIONAL: No fever, weight loss, or fatigue  EYES: No eye pain, visual disturbances, or discharge  ENT:  No difficulty hearing, tinnitus, vertigo; No sinus or throat pain  NECK: No pain or stiffness  RESPIRATORY: No cough, wheezing, chills or hemoptysis; No Shortness of Breath  CARDIOVASCULAR: No chest pain, palpitations, passing out, dizziness, or leg swelling  GASTROINTESTINAL: No abdominal or epigastric pain. No nausea, vomiting, or hematemesis; No diarrhea or constipation. No melena or hematochezia.  GENITOURINARY: No dysuria, frequency, hematuria, or incontinence  NEUROLOGICAL: No headaches, memory loss, loss of strength, numbness, or tremors  SKIN: No itching, burning, rashes, or lesions   LYMPH Nodes: No enlarged glands  ENDOCRINE: No heat or cold intolerance; No hair loss  MUSCULOSKELETAL: No joint pain or swelling; No muscle, back, +extremity pain  PSYCHIATRIC: No depression, anxiety, mood swings, or difficulty sleeping  HEME/LYMPH: No easy bruising, or bleeding gums  ALLERGY AND IMMUNOLOGIC: No hives or eczema	    [ ] All others negative	  [ ] Unable to obtain    PHYSICAL EXAM:  T(C): 37.2 (03-14-23 @ 22:37), Max: 38.1 (03-14-23 @ 19:25)  HR: 92 (03-14-23 @ 22:37) (81 - 111)  BP: 156/85 (03-14-23 @ 22:37) (113/57 - 184/93)  RR: 16 (03-14-23 @ 22:37) (14 - 20)  SpO2: 95% (03-14-23 @ 22:37) (95% - 98%)  Wt(kg): --  I&O's Summary      Appearance: Normal	  HEENT:   Normal oral mucosa, PERRL, EOMI	  Lymphatic: No lymphadenopathy  Cardiovascular: Normal S1 S2, No JVD, No murmurs, No edema  Respiratory: Lungs clear to auscultation	  Psychiatry: A & O x 3, Mood & affect appropriate  Gastrointestinal:  Soft, Non-tender, + BS	  Skin: No rashes, No ecchymoses, No cyanosis	  Neurologic: Non-focal  Extremities: Normal range of motion,  left heel wound w central eschar, no bogginess, no signs of infection. right heel with no open wounds or lesions  Vascular: + pvd    TELEMETRY: 	    ECG:  	  RADIOLOGY:  OTHER: 	  	  LABS:	 	    CARDIAC MARKERS:                              9.1    9.42  )-----------( 162      ( 14 Mar 2023 14:29 )             28.6     03-14    137  |  99  |  34<H>  ----------------------------<  281<H>  3.5   |  27  |  3.86<H>    Ca    8.9      14 Mar 2023 14:29    TPro  6.2  /  Alb  3.9  /  TBili  0.3  /  DBili  x   /  AST  7<L>  /  ALT  8<L>  /  AlkPhos  101  03-14    proBNP:   Lipid Profile:   HgA1c:   TSH:   PT/INR - ( 14 Mar 2023 14:32 )   PT: 12.6 sec;   INR: 1.09 ratio         PTT - ( 14 Mar 2023 14:32 )  PTT:32.6 sec    PREVIOUS DIAGNOSTIC TESTING:    < from: 12 Lead ECG (12.29.22 @ 10:00) >  Diagnosis Line NORMAL SINUS RHYTHM  T WAVE ABNORMALITY, CONSIDER LATERAL ISCHEMIA  ABNORMAL ECG  WHEN COMPARED WITH ECG OF 13-DEC-2022  NO SIGNIFICANT CHANGE WAS FOUND    < from: TTE with Doppler (w/Cont) (10.28.22 @ 12:56) >  1. Moderate segmental left ventricular systolic  dysfunction.  Hypokinenesis of the inferior, inferolateral  wall, apex, apial septum, antewrolateral wall.  Endocardial visualization enhanced with intravenous  injection of Ultrasonic Enhancing Agent (Definity). No left  ventricular thrombus.  2. Increased E/e'  is consistent with elevated left  ventricular filling pressure.  3. The right ventricle is not well visualized; grossly  normal right ventricular systolic function.

## 2023-03-14 NOTE — ED PROVIDER NOTE - PHYSICAL EXAMINATION
GENERAL: Awake, alert, NAD  HEENT: NC/AT, moist mucous membranes, EOMI  LUNGS: CTAB, no wheezes or crackles   CARDIAC: RRR, no m/r/g  ABDOMEN: Soft, non tender, non distended, no rebound, no guarding  EXT: Left heel wound found with central eschar.  No signs of acute drainage at this time.  NEURO: A&Ox3. Moving all extremities.  PSYCH: Normal affect.

## 2023-03-14 NOTE — ED ADULT NURSE REASSESSMENT NOTE - NS ED NURSE REASSESS COMMENT FT1
Received report from SELVIN Singh. Patient A&Ox4. Patient febrile to 100.5F will call provider to request tylenol. Safety measures maintained. Bed in the lowest position. No acute distress noted or further complaints at this time.

## 2023-03-14 NOTE — CONSULT NOTE ADULT - SUBJECTIVE AND OBJECTIVE BOX
Patient is a 62y old  Male who presents with a chief complaint of left heel wound    HPI:  62-year-old male patient past medical history of yet end-stage renal disease on dialysis MWF, CAD status post stents, CHF, cardiomyopathy, diabetes who presents to the ED for fever that began this morning.  Per wife, patient has been experiencing an unsteady gait since this AM.  Yesterday's HD session ended a little bit early than normal given patient was feeling unwell, and was found hypotensive.  Fever today was 101.4 at home and wife gave Tylenol at the time.  Patient with left heel wound that has been taking care off and dressed daily by wife.  Endorsing mild drainage for the past week, and was seen by podiatry and found with positive heel culture    PAST MEDICAL & SURGICAL HISTORY:  Diabetes Mellitus Type II, Uncontrolled      Dyslipidemia      s/p Angioplasty with Stent      HTN (hypertension)      Gout      Diabetic retinopathy      GERD (gastroesophageal reflux disease)      Nephrolithiasis      Vitamin D deficiency      Hepatitis      CKD (chronic kidney disease)      Ischemic cardiomyopathy      ASHD (arteriosclerotic heart disease)      Pulmonary hypertension      Myocardial infarction      CAD (coronary artery disease)      BPH (benign prostatic hyperplasia)      Retinal Hemorrhage      S/P coronary artery stent placement  2010 TRICIA to Diag ; 11/18/2010  LAD; 2/4/11 ATOM liberte Diag; 5/15/2017  TRICIA to 1st diag; 6/7/17 PCI with laser and high pressure balloon      History of eye surgery  left eye      H/O lithotripsy      Diabetes with retinopathy  S/P PPV/PRP/GAS  OD 2/22/17 for viterous hemorrhage      CHF with cardiomyopathy  Insertion of Cardiomems monitor      Stented coronary artery          MEDICATIONS  (STANDING):    MEDICATIONS  (PRN):      Allergies    No Known Drug Allergies  shellfish (Unknown)    Intolerances        VITALS:    Vital Signs Last 24 Hrs  T(C): 37.7 (14 Mar 2023 12:36), Max: 37.7 (14 Mar 2023 12:36)  T(F): 99.9 (14 Mar 2023 12:36), Max: 99.9 (14 Mar 2023 12:36)  HR: 98 (14 Mar 2023 12:36) (98 - 98)  BP: 113/57 (14 Mar 2023 12:36) (113/57 - 113/57)  BP(mean): --  RR: 20 (14 Mar 2023 12:36) (20 - 20)  SpO2: 95% (14 Mar 2023 12:36) (95% - 95%)    Parameters below as of 14 Mar 2023 12:36  Patient On (Oxygen Delivery Method): room air        LABS:                          9.1    9.42  )-----------( 162      ( 14 Mar 2023 14:29 )             28.6       03-14    137  |  99  |  34<H>  ----------------------------<  281<H>  3.5   |  27  |  3.86<H>    Ca    8.9      14 Mar 2023 14:29    TPro  6.2  /  Alb  3.9  /  TBili  0.3  /  DBili  x   /  AST  7<L>  /  ALT  8<L>  /  AlkPhos  101  03-14      CAPILLARY BLOOD GLUCOSE          PT/INR - ( 14 Mar 2023 14:32 )   PT: 12.6 sec;   INR: 1.09 ratio         PTT - ( 14 Mar 2023 14:32 )  PTT:32.6 sec    LOWER EXTREMITY PHYSICAL EXAM:    Vascular: DP/PT 2/4, B/L, CFT <3 seconds B/L, Temperature gradient warm to warm, B/L.   Neuro: Epicritic sensation intact to the level of digits, B/L.  Musculoskeletal/Ortho: unremarkable  Skin: left heel wound w central eschar, no bogginess, no signs of infection. right heel with no open wounds or lesions    RADIOLOGY & ADDITIONAL STUDIES:    < from: Xray Foot AP + Lateral + Oblique, Left (03.14.23 @ 13:47) >    ACC: [id number] EXAM:  XR ANKLE COMP MIN 3 VIEWS LT   ORDERED BY: JAYSON DUNHAM     ACC: [id number] EXAM:  XR FOOT COMP MIN 3 VIEWS LT   ORDERED BY: JAYSON DUNHAM     PROCEDURE DATE:  03/14/2023          INTERPRETATION:  CLINICAL INFORMATION: Heel wound, for evaluation of   osteomyelitis.    COMPARISON: Radiographs dated 3/2/2023.    TECHNIQUE: 3 nonweightbearing views of the left foot, 3 views of the left   ankle.    FINDINGS:  No acute fracture or dislocation.  Unchangedmoderate arthrosis and moderate hallux valgus at the first MTP.   Unchanged adjacent juxta-articular erosion along the medial cortex of the   first metatarsal neck.  No definite cortical erosion or periosteal reaction of the posterior   calcaneal tuberosity.  Plantar calcaneal spur.  Bony mineralization within normal limits.  No aggressive osseous neoplasm.  No focal soft tissue swelling appreciated. Ill-defined superficial heel   ulceration. No tracking soft tissue emphysema. Advanced atherosclerosis.  No radiopaque foreign body.    IMPRESSION:  1.  No convincing plain radiographic evidence for acute left calcaneal   osteomyelitis.  2.  Moderate arthrosis and moderate hallux valgus at left first MTP with   evidence for gouty arthropathy in the proper clinical setting.    --- End of Report ---            YNES RODRIGUEZ MD; Attending Radiologist  This document has been electronically signed. Mar 14 2023  1:54PM    < end of copied text >

## 2023-03-14 NOTE — ED ADULT NURSE REASSESSMENT NOTE - NS ED NURSE REASSESS COMMENT FT1
Called JOEL Page at [phone] to notify provider of patient VS, oral temperature of 100.5 HR of 111 and BP of 184/93 awaiting orders at this time.

## 2023-03-14 NOTE — ED ADULT NURSE NOTE - OBJECTIVE STATEMENT
Middle-aged male presenting to ED for evaluation of fever. Wife also reporting left heel wound with recent cultures results available. Nondiaphoretic. Respirations even and unlabored. No respiratory distress reported. No urinary complaint offered. Currently on dialysis. Able to still make urine. Resting comfortably at present time.

## 2023-03-15 ENCOUNTER — APPOINTMENT (OUTPATIENT)
Dept: ENDOCRINOLOGY | Facility: CLINIC | Age: 63
End: 2023-03-15

## 2023-03-15 LAB
A1C WITH ESTIMATED AVERAGE GLUCOSE RESULT: 7 % — HIGH (ref 4–5.6)
CULTURE RESULTS: SIGNIFICANT CHANGE UP
ESTIMATED AVERAGE GLUCOSE: 154 MG/DL — HIGH (ref 68–114)
GLUCOSE BLDC GLUCOMTR-MCNC: 155 MG/DL — HIGH (ref 70–99)
GLUCOSE BLDC GLUCOMTR-MCNC: 222 MG/DL — HIGH (ref 70–99)
GLUCOSE BLDC GLUCOMTR-MCNC: 259 MG/DL — HIGH (ref 70–99)
GLUCOSE BLDC GLUCOMTR-MCNC: 275 MG/DL — HIGH (ref 70–99)
GLUCOSE BLDC GLUCOMTR-MCNC: 279 MG/DL — HIGH (ref 70–99)
SPECIMEN SOURCE: SIGNIFICANT CHANGE UP

## 2023-03-15 RX ORDER — ERYTHROPOIETIN 10000 [IU]/ML
10000 INJECTION, SOLUTION INTRAVENOUS; SUBCUTANEOUS ONCE
Refills: 0 | Status: COMPLETED | OUTPATIENT
Start: 2023-03-15 | End: 2023-03-15

## 2023-03-15 RX ORDER — CHLORHEXIDINE GLUCONATE 213 G/1000ML
1 SOLUTION TOPICAL ONCE
Refills: 0 | Status: DISCONTINUED | OUTPATIENT
Start: 2023-03-15 | End: 2023-03-20

## 2023-03-15 RX ORDER — VALSARTAN 80 MG/1
40 TABLET ORAL DAILY
Refills: 0 | Status: DISCONTINUED | OUTPATIENT
Start: 2023-03-15 | End: 2023-03-20

## 2023-03-15 RX ORDER — INSULIN LISPRO 100/ML
VIAL (ML) SUBCUTANEOUS AT BEDTIME
Refills: 0 | Status: DISCONTINUED | OUTPATIENT
Start: 2023-03-15 | End: 2023-03-20

## 2023-03-15 RX ORDER — METOPROLOL TARTRATE 50 MG
2.5 TABLET ORAL ONCE
Refills: 0 | Status: COMPLETED | OUTPATIENT
Start: 2023-03-15 | End: 2023-03-15

## 2023-03-15 RX ORDER — LIDOCAINE 4 G/100G
1 CREAM TOPICAL
Refills: 0 | Status: DISCONTINUED | OUTPATIENT
Start: 2023-03-15 | End: 2023-03-20

## 2023-03-15 RX ORDER — METOPROLOL TARTRATE 50 MG
2.5 TABLET ORAL EVERY 6 HOURS
Refills: 0 | Status: DISCONTINUED | OUTPATIENT
Start: 2023-03-15 | End: 2023-03-15

## 2023-03-15 RX ORDER — CARVEDILOL PHOSPHATE 80 MG/1
12.5 CAPSULE, EXTENDED RELEASE ORAL EVERY 12 HOURS
Refills: 0 | Status: DISCONTINUED | OUTPATIENT
Start: 2023-03-15 | End: 2023-03-20

## 2023-03-15 RX ORDER — LIDOCAINE 4 G/100G
1 CREAM TOPICAL
Refills: 0 | Status: DISCONTINUED | OUTPATIENT
Start: 2023-03-15 | End: 2023-03-15

## 2023-03-15 RX ORDER — HYDRALAZINE HCL 50 MG
10 TABLET ORAL ONCE
Refills: 0 | Status: COMPLETED | OUTPATIENT
Start: 2023-03-15 | End: 2023-03-15

## 2023-03-15 RX ADMIN — Medication 3 MILLIGRAM(S): at 21:16

## 2023-03-15 RX ADMIN — PIPERACILLIN AND TAZOBACTAM 25 GRAM(S): 4; .5 INJECTION, POWDER, LYOPHILIZED, FOR SOLUTION INTRAVENOUS at 05:10

## 2023-03-15 RX ADMIN — SEVELAMER CARBONATE 800 MILLIGRAM(S): 2400 POWDER, FOR SUSPENSION ORAL at 21:16

## 2023-03-15 RX ADMIN — SEVELAMER CARBONATE 800 MILLIGRAM(S): 2400 POWDER, FOR SUSPENSION ORAL at 08:45

## 2023-03-15 RX ADMIN — HEPARIN SODIUM 5000 UNIT(S): 5000 INJECTION INTRAVENOUS; SUBCUTANEOUS at 21:16

## 2023-03-15 RX ADMIN — FAMOTIDINE 10 MILLIGRAM(S): 10 INJECTION INTRAVENOUS at 13:01

## 2023-03-15 RX ADMIN — TAMSULOSIN HYDROCHLORIDE 0.4 MILLIGRAM(S): 0.4 CAPSULE ORAL at 21:17

## 2023-03-15 RX ADMIN — Medication 2: at 13:02

## 2023-03-15 RX ADMIN — Medication 75 MILLIGRAM(S): at 21:16

## 2023-03-15 RX ADMIN — Medication 100 MILLIGRAM(S): at 21:15

## 2023-03-15 RX ADMIN — ATORVASTATIN CALCIUM 80 MILLIGRAM(S): 80 TABLET, FILM COATED ORAL at 21:16

## 2023-03-15 RX ADMIN — Medication 81 MILLIGRAM(S): at 13:01

## 2023-03-15 RX ADMIN — Medication 500 MILLIGRAM(S): at 13:01

## 2023-03-15 RX ADMIN — Medication 10 MILLIGRAM(S): at 06:02

## 2023-03-15 RX ADMIN — Medication 25 MILLIGRAM(S): at 05:09

## 2023-03-15 RX ADMIN — CARVEDILOL PHOSPHATE 12.5 MILLIGRAM(S): 80 CAPSULE, EXTENDED RELEASE ORAL at 21:15

## 2023-03-15 RX ADMIN — Medication 3: at 21:15

## 2023-03-15 RX ADMIN — Medication 1: at 23:08

## 2023-03-15 RX ADMIN — PIPERACILLIN AND TAZOBACTAM 25 GRAM(S): 4; .5 INJECTION, POWDER, LYOPHILIZED, FOR SOLUTION INTRAVENOUS at 21:15

## 2023-03-15 RX ADMIN — SENNA PLUS 2 TABLET(S): 8.6 TABLET ORAL at 21:16

## 2023-03-15 RX ADMIN — ERYTHROPOIETIN 10000 UNIT(S): 10000 INJECTION, SOLUTION INTRAVENOUS; SUBCUTANEOUS at 18:17

## 2023-03-15 RX ADMIN — Medication 1 MILLIGRAM(S): at 13:02

## 2023-03-15 RX ADMIN — Medication 2.5 MILLIGRAM(S): at 02:54

## 2023-03-15 RX ADMIN — CLOPIDOGREL BISULFATE 75 MILLIGRAM(S): 75 TABLET, FILM COATED ORAL at 13:01

## 2023-03-15 RX ADMIN — HEPARIN SODIUM 5000 UNIT(S): 5000 INJECTION INTRAVENOUS; SUBCUTANEOUS at 05:09

## 2023-03-15 RX ADMIN — Medication 3: at 08:44

## 2023-03-15 RX ADMIN — SEVELAMER CARBONATE 800 MILLIGRAM(S): 2400 POWDER, FOR SUSPENSION ORAL at 13:01

## 2023-03-15 NOTE — CONSULT NOTE ADULT - SUBJECTIVE AND OBJECTIVE BOX
NEPHROLOGY - NSN    Patient seen and examined.       HPI:  62-year-old male patient past medical history of yet end-stage renal disease on dialysis MWF, CAD status post stents, CHF, cardiomyopathy, diabetes who presents to the ED for fever that began this morning.  Per wife, patient has been experiencing an unsteady gait since this AM.  Yesterday's HD session ended a little bit early than normal given patient was feeling unwell, and was found hypotensive.  Fever today was 101.4 at home and wife gave Tylenol at the time.  Patient with left heel wound that has been taking care off and dressed daily by wife.  Endorsing mild drainage for the past week, and was seen by podiatry and found with positive heel culture.    PAST MEDICAL & SURGICAL HISTORY:  Diabetes Mellitus Type II, Uncontrolled  Dyslipidemia  s/p Angioplasty with Stent  HTN (hypertension)  Gout  Diabetic retinopathy  GERD (gastroesophageal reflux disease)  Nephrolithiasis  Vitamin D deficiency  Hepatitis  CKD (chronic kidney disease)  Ischemic cardiomyopathy  ASHD (arteriosclerotic heart disease)  Pulmonary hypertension  Myocardial infarction  CAD (coronary artery disease)  BPH (benign prostatic hyperplasia)  Retinal Hemorrhage  S/P coronary artery stent placement   TRICIA to Diag ; 2010  LAD; 11 ATOM liberte Diag; 5/15/2017  TRICIA to 1st diag; 17 PCI with laser and high pressure balloon  History of eye surgery  left eye  H/O lithotripsy  Diabetes with retinopathy  S/P PPV/PRP/GAS  OD 17 for viterous hemorrhage  CHF with cardiomyopathy  Insertion of Cardiomems monitor  Stented coronary artery    MEDICATIONS  (STANDING):  noted  MEDICATIONS  (PRN):      FAMILY HISTORY:  Family history of cardiac disorder in father (Father)    SOCIAL HISTORY:    [x ] Non-smoker  [ ] Smoker  [ ] Alcohol    Allergies    No Known Drug Allergies  shellfish (Unknown)    Intolerances    	    REVIEW OF SYSTEMS:  CONSTITUTIONAL: No fever, weight loss, or fatigue  EYES: No eye pain, visual disturbances, or discharge  ENT:  No difficulty hearing, tinnitus, vertigo; No sinus or throat pain  NECK: No pain or stiffness  RESPIRATORY: No cough, wheezing, chills or hemoptysis; No Shortness of Breath  CARDIOVASCULAR: No chest pain, palpitations, passing out, dizziness, or leg swelling  GASTROINTESTINAL: No abdominal or epigastric pain. No nausea, vomiting, or hematemesis; No diarrhea or constipation. No melena or hematochezia.  GENITOURINARY: No dysuria, frequency, hematuria, or incontinence  NEUROLOGICAL: No headaches, memory loss, loss of strength, numbness, or tremors  SKIN: No itching, burning, rashes, or lesions   LYMPH Nodes: No enlarged glands  ENDOCRINE: No heat or cold intolerance; No hair loss  MUSCULOSKELETAL: No joint pain or swelling; No muscle, back, +extremity pain  PSYCHIATRIC: No depression, anxiety, mood swings, or difficulty sleeping  HEME/LYMPH: No easy bruising, or bleeding gums  ALLERGY AND IMMUNOLOGIC: No hives or eczema	    [ ] All others negative	  [ ] Unable to obtain    PHYSICAL EXAM:  T(C): 37.2 (23 @ 22:37), Max: 38.1 (23 @ 19:25)  HR: 92 (23 @ 22:37) (81 - 111)  BP: 156/85 (23 @ 22:37) (113/57 - 184/93)  RR: 16 (23 @ 22:37) (14 - 20)  SpO2: 95% (23 @ 22:37) (95% - 98%)  Wt(kg): --  I&O's Summary      Appearance: Normal	  HEENT:   Normal oral mucosa, PERRL, EOMI	  Lymphatic: No lymphadenopathy  Cardiovascular: Normal S1 S2, No JVD, No murmurs, No edema  Respiratory: Lungs clear to auscultation	  Psychiatry: A & O x 3, Mood & affect appropriate  Gastrointestinal:  Soft, Non-tender, + BS	  Skin: No rashes, No ecchymoses, No cyanosis	  Neurologic: Non-focal  Extremities: Normal range of motion,  left heel wound w central eschar, no bogginess, no signs of infection. right heel with no open wounds or lesions  Vascular: + pvd    TELEMETRY: 	    ECG:  	  RADIOLOGY:  OTHER: 	  	  LABS:	 	    CARDIAC MARKERS:                              9.1    9.42  )-----------( 162      ( 14 Mar 2023 14:29 )             28.6     -    137  |  99  |  34<H>  ----------------------------<  281<H>  3.5   |  27  |  3.86<H>    Ca    8.9      14 Mar 2023 14:29    TPro  6.2  /  Alb  3.9  /  TBili  0.3  /  DBili  x   /  AST  7<L>  /  ALT  8<L>  /  AlkPhos  101  03-14    proBNP:   Lipid Profile:   HgA1c:   TSH:   PT/INR - ( 14 Mar 2023 14:32 )   PT: 12.6 sec;   INR: 1.09 ratio         PTT - ( 14 Mar 2023 14:32 )  PTT:32.6 sec    PREVIOUS DIAGNOSTIC TESTING:    < from: 12 Lead ECG (.22 @ 10:00) >  Diagnosis Line NORMAL SINUS RHYTHM  T WAVE ABNORMALITY, CONSIDER LATERAL ISCHEMIA  ABNORMAL ECG  WHEN COMPARED WITH ECG OF 13-DEC-2022  NO SIGNIFICANT CHANGE WAS FOUND    < from: TTE with Doppler (w/Cont) (10..22 @ 12:56) >  1. Moderate segmental left ventricular systolic  dysfunction.  Hypokinenesis of the inferior, inferolateral  wall, apex, apial septum, antewrolateral wall.  Endocardial visualization enhanced with intravenous  injection of Ultrasonic Enhancing Agent (Definity). No left  ventricular thrombus.  2. Increased E/e'  is consistent with elevated left  ventricular filling pressure.  3. The right ventricle is not well visualized; grossly  normal right ventricular systolic function.               (14 Mar 2023 23:12)      PAST MEDICAL & SURGICAL HISTORY:  Diabetes Mellitus Type II, Uncontrolled      Dyslipidemia      s/p Angioplasty with Stent      HTN (hypertension)      Gout      Diabetic retinopathy      GERD (gastroesophageal reflux disease)      Nephrolithiasis      Vitamin D deficiency      Hepatitis      CKD (chronic kidney disease)      Ischemic cardiomyopathy      ASHD (arteriosclerotic heart disease)      Pulmonary hypertension      Myocardial infarction      CAD (coronary artery disease)      BPH (benign prostatic hyperplasia)      Retinal Hemorrhage      S/P coronary artery stent placement   TRICIA to Diag ; 2010  LAD; 11 ATOM liberte Diag; 5/15/2017  TRICIA to  diag; 17 PCI with laser and high pressure balloon      History of eye surgery  left eye      H/O lithotripsy      Diabetes with retinopathy  S/P PPV/PRP/GAS  OD 17 for viterous hemorrhage      CHF with cardiomyopathy  Insertion of Cardiomems monitor      Stented coronary artery          MEDICATIONS  (STANDING):  allopurinol 100 milliGRAM(s) Oral at bedtime  ascorbic acid 500 milliGRAM(s) Oral daily  aspirin enteric coated 81 milliGRAM(s) Oral daily  atorvastatin 80 milliGRAM(s) Oral at bedtime  carvedilol 12.5 milliGRAM(s) Oral every 12 hours  clopidogrel Tablet 75 milliGRAM(s) Oral daily  dextrose 5%. 1000 milliLiter(s) (50 mL/Hr) IV Continuous <Continuous>  dextrose 5%. 1000 milliLiter(s) (100 mL/Hr) IV Continuous <Continuous>  dextrose 50% Injectable 25 Gram(s) IV Push once  dextrose 50% Injectable 12.5 Gram(s) IV Push once  dextrose 50% Injectable 25 Gram(s) IV Push once  famotidine    Tablet 10 milliGRAM(s) Oral daily  folic acid 1 milliGRAM(s) Oral daily  glucagon  Injectable 1 milliGRAM(s) IntraMuscular once  heparin   Injectable 5000 Unit(s) SubCutaneous every 12 hours  insulin lispro (ADMELOG) corrective regimen sliding scale   SubCutaneous three times a day before meals  melatonin 3 milliGRAM(s) Oral at bedtime  piperacillin/tazobactam IVPB.. 3.375 Gram(s) IV Intermittent every 12 hours  pregabalin 75 milliGRAM(s) Oral at bedtime  senna 2 Tablet(s) Oral at bedtime  sevelamer carbonate 800 milliGRAM(s) Oral three times a day with meals  tamsulosin 0.4 milliGRAM(s) Oral at bedtime  valsartan 40 milliGRAM(s) Oral daily      Allergies    No Known Drug Allergies  shellfish (Unknown)    Intolerances        SOCIAL HISTORY:  Denies alcohol abuse, drug abuse or tobacco usage.     FAMILY HISTORY:  Family history of cardiac disorder in father (Father)        VITALS:  T(C): 37 (03-15-23 @ 05:29), Max: 38.1 (23 @ 19:25)  HR: 95 (03-15-23 @ 05:29) (81 - 114)  BP: 170/81 (03-15-23 @ 05:29) (113/57 - 196/87)  RR: 18 (03-15-23 @ 05:29) (14 - 20)  SpO2: 97% (03-15-23 @ 05:29) (93% - 99%)    REVIEW OF SYSTEMS:  Denies any nausea, vomiting, diarrhea, fever or chills. Denies chest pain, SOB, focal weakness, hematuria or dysuria. Good oral intake and denies fatigue or weakness. All other pertinent systems are reviewed and are negative.    PHYSICAL EXAM:  Constitutional: NAD  HEENT: EOMI  Neck:  No JVD, supple   Respiratory: CTA B/L  Cardiovascular: S1 and S2, RRR  Gastrointestinal: + BS, soft, NT, ND  Extremities: No peripheral edema, + peripheral pulses  Neurological: A/O x 3, CN2-12 intact  Psychiatric: Normal mood, normal affect  : No Jay  Skin: No rashes, C/D/I  Access: Not applicable    I and O's:     @ 07:01  -  15 @ 07:00  --------------------------------------------------------  IN: 0 mL / OUT: 250 mL / NET: -250 mL      Height (cm): 170.2 ( @ 12:36)  Weight (kg): 84.8 ( @ 12:36)  BMI (kg/m2): 29.3 ( @ 12:36)  BSA (m2): 1.97 ( @ 12:36)    LABS:                        9.1    9.42  )-----------( 162      ( 14 Mar 2023 14:29 )             28.6         137  |  99  |  34<H>  ----------------------------<  281<H>  3.5   |  27  |  3.86<H>    Ca    8.9      14 Mar 2023 14:29    TPro  6.2  /  Alb  3.9  /  TBili  0.3  /  DBili  x   /  AST  7<L>  /  ALT  8<L>  /  AlkPhos  101        URINE:  Urinalysis Basic - ( 14 Mar 2023 17:20 )    Color: Light Yellow / Appearance: Clear / S.010 / pH: x  Gluc: x / Ketone: Negative  / Bili: Negative / Urobili: Negative   Blood: x / Protein: 300 mg/dL / Nitrite: Negative   Leuk Esterase: Negative / RBC: 4 /hpf / WBC 1 /HPF   Sq Epi: x / Non Sq Epi: 0 /hpf / Bacteria: Negative        RADIOLOGY & ADDITIONAL STUDIES:     NEPHROLOGY - NSN    Patient seen and examined.       HPI:  62-year-old male patient past medical history of end-stage renal disease on dialysis MWF, CAD status post stents, CHF, cardiomyopathy, diabetes who presents to the ED for fever that began this morning.  Per wife, patient has been experiencing an unsteady gait since this AM.  Yesterday's HD session ended a little bit early than normal given patient was feeling unwell, and was found hypotensive.  Fever today was 101.4 at home and wife gave Tylenol at the time.  Patient with left heel wound that has been taking care off and dressed daily by wife.  Endorsing mild drainage for the past week, and was seen by podiatry and found with positive heel culture.  He gets HD at [facility] and now going to [facility]      PAST MEDICAL & SURGICAL HISTORY:  Diabetes Mellitus Type II, Uncontrolled  Dyslipidemia  s/p Angioplasty with Stent  HTN (hypertension)  Gout  Diabetic retinopathy  GERD (gastroesophageal reflux disease)  Nephrolithiasis  Vitamin D deficiency  Hepatitis  CKD (chronic kidney disease)  Ischemic cardiomyopathy  ASHD (arteriosclerotic heart disease)  Pulmonary hypertension  Myocardial infarction  CAD (coronary artery disease)  BPH (benign prostatic hyperplasia)  Retinal Hemorrhage  S/P coronary artery stent placement   TRICIA to Diag ; 2010  LAD; 11 ATOM liberte Diag; 5/15/2017  TRICIA to  diag; 17 PCI with laser and high pressure balloon  History of eye surgery  left eye  H/O lithotripsy  Diabetes with retinopathy  S/P PPV/PRP/GAS  OD 17 for viterous hemorrhage  CHF with cardiomyopathy  Insertion of Cardiomems monitor  Stented coronary artery    MEDICATIONS  (STANDING):  noted  MEDICATIONS  (PRN):      < from: TTE with Doppler (w/Cont) (10.28.22 @ 12:56) >  1. Moderate segmental left ventricular systolic  dysfunction.  Hypokinenesis of the inferior, inferolateral  wall, apex, apial septum, antewrolateral wall.  Endocardial visualization enhanced with intravenous  injection of Ultrasonic Enhancing Agent (Definity). No left  ventricular thrombus.  2. Increased E/e'  is consistent with elevated left  ventricular filling pressure.  3. The right ventricle is not well visualized; grossly  normal right ventricular systolic function.             PAST MEDICAL & SURGICAL HISTORY:  Diabetes Mellitus Type II, Uncontrolled      Dyslipidemia      s/p Angioplasty with Stent      HTN (hypertension)      Gout      Diabetic retinopathy      GERD (gastroesophageal reflux disease)      Nephrolithiasis      Vitamin D deficiency      Hepatitis      CKD (chronic kidney disease)      Ischemic cardiomyopathy      ASHD (arteriosclerotic heart disease)      Pulmonary hypertension      Myocardial infarction      CAD (coronary artery disease)      BPH (benign prostatic hyperplasia)      Retinal Hemorrhage      S/P coronary artery stent placement   TRICIA to Diag ; 2010  LAD; 11 ATOM liberte Diag; 5/15/2017  TRICIA to 1st diag; 17 PCI with laser and high pressure balloon      History of eye surgery  left eye      H/O lithotripsy      Diabetes with retinopathy  S/P PPV/PRP/GAS  OD 17 for viterous hemorrhage      CHF with cardiomyopathy  Insertion of Cardiomems monitor      Stented coronary artery          MEDICATIONS  (STANDING):  allopurinol 100 milliGRAM(s) Oral at bedtime  ascorbic acid 500 milliGRAM(s) Oral daily  aspirin enteric coated 81 milliGRAM(s) Oral daily  atorvastatin 80 milliGRAM(s) Oral at bedtime  carvedilol 12.5 milliGRAM(s) Oral every 12 hours  clopidogrel Tablet 75 milliGRAM(s) Oral daily  dextrose 5%. 1000 milliLiter(s) (50 mL/Hr) IV Continuous <Continuous>  dextrose 5%. 1000 milliLiter(s) (100 mL/Hr) IV Continuous <Continuous>  dextrose 50% Injectable 25 Gram(s) IV Push once  dextrose 50% Injectable 12.5 Gram(s) IV Push once  dextrose 50% Injectable 25 Gram(s) IV Push once  famotidine    Tablet 10 milliGRAM(s) Oral daily  folic acid 1 milliGRAM(s) Oral daily  glucagon  Injectable 1 milliGRAM(s) IntraMuscular once  heparin   Injectable 5000 Unit(s) SubCutaneous every 12 hours  insulin lispro (ADMELOG) corrective regimen sliding scale   SubCutaneous three times a day before meals  melatonin 3 milliGRAM(s) Oral at bedtime  piperacillin/tazobactam IVPB.. 3.375 Gram(s) IV Intermittent every 12 hours  pregabalin 75 milliGRAM(s) Oral at bedtime  senna 2 Tablet(s) Oral at bedtime  sevelamer carbonate 800 milliGRAM(s) Oral three times a day with meals  tamsulosin 0.4 milliGRAM(s) Oral at bedtime  valsartan 40 milliGRAM(s) Oral daily      Allergies    No Known Drug Allergies  shellfish (Unknown)    Intolerances        SOCIAL HISTORY:  Denies alcohol abuse, drug abuse or tobacco usage.     FAMILY HISTORY:  Family history of cardiac disorder in father (Father)        VITALS:  T(C): 37 (03-15-23 @ 05:29), Max: 38.1 (23 @ 19:25)  HR: 95 (03-15-23 @ 05:29) (81 - 114)  BP: 170/81 (03-15-23 @ 05:29) (113/57 - 196/87)  RR: 18 (03-15-23 @ 05:29) (14 - 20)  SpO2: 97% (03-15-23 @ 05:29) (93% - 99%)    REVIEW OF SYSTEMS:  +  fatigue or weakness. All other pertinent systems are reviewed and are negative.    PHYSICAL EXAM:  Constitutional: NAD  HEENT: EOMI  Neck:  No JVD, supple   Respiratory: CTA B/L  Cardiovascular: S1 and S2, RRR  Gastrointestinal: + BS, soft, NT, ND  Extremities: No peripheral edema, + peripheral pulses  Neurological: A/O x 3, CN2-12 intact  Psychiatric: Normal mood, normal affect  : No Jay  Skin: No rashes, C/D/I  Access: avf    I and O's:     @ 07:01  -  03-15 @ 07:00  --------------------------------------------------------  IN: 0 mL / OUT: 250 mL / NET: -250 mL      Height (cm): 170.2 ( @ 12:36)  Weight (kg): 84.8 ( @ 12:36)  BMI (kg/m2): 29.3 ( @ 12:36)  BSA (m2): 1.97 ( @ 12:36)    LABS:                        9.1    9.42  )-----------( 162      ( 14 Mar 2023 14:29 )             28.6         137  |  99  |  34<H>  ----------------------------<  281<H>  3.5   |  27  |  3.86<H>    Ca    8.9      14 Mar 2023 14:29    TPro  6.2  /  Alb  3.9  /  TBili  0.3  /  DBili  x   /  AST  7<L>  /  ALT  8<L>  /  AlkPhos  101        URINE:  Urinalysis Basic - ( 14 Mar 2023 17:20 )    Color: Light Yellow / Appearance: Clear / S.010 / pH: x  Gluc: x / Ketone: Negative  / Bili: Negative / Urobili: Negative   Blood: x / Protein: 300 mg/dL / Nitrite: Negative   Leuk Esterase: Negative / RBC: 4 /hpf / WBC 1 /HPF   Sq Epi: x / Non Sq Epi: 0 /hpf / Bacteria: Negative        RADIOLOGY & ADDITIONAL STUDIES:    < from: Xray Foot AP + Lateral + Oblique, Left (23 @ 13:47) >    ACC: [id number] EXAM:  XR ANKLE COMP MIN 3 VIEWS LT   ORDERED BY: JAYSON DUNHAM     ACC: [id number] EXAM:  XR FOOT COMP MIN 3 VIEWS LT   ORDERED BY: JAYSON DUNHAM     PROCEDURE DATE:  2023          INTERPRETATION:  CLINICAL INFORMATION: Heel wound, for evaluation of   osteomyelitis.    COMPARISON: Radiographs dated 3/2/2023.    TECHNIQUE: 3 nonweightbearing views of the left foot, 3 views of the left   ankle.    FINDINGS:  No acute fracture or dislocation.  Unchangedmoderate arthrosis and moderate hallux valgus at the first MTP.   Unchanged adjacent juxta-articular erosion along the medial cortex of the   first metatarsal neck.  No definite cortical erosion or periosteal reaction of the posterior   calcaneal tuberosity.  Plantar calcaneal spur.  Bony mineralization within normal limits.  No aggressive osseous neoplasm.  No focal soft tissue swelling appreciated. Ill-defined superficial heel   ulceration. No tracking soft tissue emphysema. Advanced atherosclerosis.  No radiopaque foreign body.    IMPRESSION:  1.  No convincing plain radiographic evidence for acute left calcaneal   osteomyelitis.  2.  Moderate arthrosis and moderate hallux valgus at left first MTP with   evidence for gouty arthropathy in the proper clinical setting.    --- End of Report ---            YNES RODRIGUEZ MD; Attending Radiologist  This docu    < end of copied text >

## 2023-03-15 NOTE — PROVIDER CONTACT NOTE (OTHER) - ACTION/TREATMENT ORDERED:
provider aware, lopressor IVP ordered/administered. will re assess. will give AM dose metoprolol at 5AM, will continue to monitor

## 2023-03-15 NOTE — CONSULT NOTE ADULT - ASSESSMENT
62-year-old male patient past medical history of end-stage renal disease on dialysis MWF, CAD status post stents, CHF, cardiomyopathy, diabetes pw possible osteomyelitis     1 Renal - HD today and will aim for 2 kg removal   Cont Renvela   2 Anemia-Retacrit at HD   3 Podiatry - Is there a need for MRI of the heal to definitively rule out osteo   IV abx     Sayed [facility]   [phone]

## 2023-03-15 NOTE — PROVIDER CONTACT NOTE (OTHER) - ASSESSMENT
Pt A&O 3, confused to situation. Vitals taken on arrival to unit, /87, . all other VSS. pt denies cp, SOB, HA. No visible signs of distress. Pt reports taking BP meds at home, but could not recall names of meds.

## 2023-03-16 ENCOUNTER — APPOINTMENT (OUTPATIENT)
Dept: VASCULAR SURGERY | Facility: CLINIC | Age: 63
End: 2023-03-16

## 2023-03-16 LAB
GLUCOSE BLDC GLUCOMTR-MCNC: 207 MG/DL — HIGH (ref 70–99)
GLUCOSE BLDC GLUCOMTR-MCNC: 250 MG/DL — HIGH (ref 70–99)
GLUCOSE BLDC GLUCOMTR-MCNC: 334 MG/DL — HIGH (ref 70–99)
GLUCOSE BLDC GLUCOMTR-MCNC: 360 MG/DL — HIGH (ref 70–99)
HBV SURFACE AB SER-ACNC: <3 MIU/ML — LOW
HBV SURFACE AB SER-ACNC: SIGNIFICANT CHANGE UP
HBV SURFACE AG SER-ACNC: SIGNIFICANT CHANGE UP
HCV AB S/CO SERPL IA: 0.09 S/CO — SIGNIFICANT CHANGE UP (ref 0–0.99)
HCV AB SERPL-IMP: SIGNIFICANT CHANGE UP

## 2023-03-16 RX ADMIN — Medication 5: at 18:57

## 2023-03-16 RX ADMIN — PIPERACILLIN AND TAZOBACTAM 25 GRAM(S): 4; .5 INJECTION, POWDER, LYOPHILIZED, FOR SOLUTION INTRAVENOUS at 05:47

## 2023-03-16 RX ADMIN — SEVELAMER CARBONATE 800 MILLIGRAM(S): 2400 POWDER, FOR SUSPENSION ORAL at 09:28

## 2023-03-16 RX ADMIN — TAMSULOSIN HYDROCHLORIDE 0.4 MILLIGRAM(S): 0.4 CAPSULE ORAL at 21:37

## 2023-03-16 RX ADMIN — SEVELAMER CARBONATE 800 MILLIGRAM(S): 2400 POWDER, FOR SUSPENSION ORAL at 19:02

## 2023-03-16 RX ADMIN — VALSARTAN 40 MILLIGRAM(S): 80 TABLET ORAL at 05:46

## 2023-03-16 RX ADMIN — CARVEDILOL PHOSPHATE 12.5 MILLIGRAM(S): 80 CAPSULE, EXTENDED RELEASE ORAL at 05:46

## 2023-03-16 RX ADMIN — CLOPIDOGREL BISULFATE 75 MILLIGRAM(S): 75 TABLET, FILM COATED ORAL at 12:49

## 2023-03-16 RX ADMIN — Medication 75 MILLIGRAM(S): at 21:36

## 2023-03-16 RX ADMIN — PIPERACILLIN AND TAZOBACTAM 25 GRAM(S): 4; .5 INJECTION, POWDER, LYOPHILIZED, FOR SOLUTION INTRAVENOUS at 19:11

## 2023-03-16 RX ADMIN — CARVEDILOL PHOSPHATE 12.5 MILLIGRAM(S): 80 CAPSULE, EXTENDED RELEASE ORAL at 19:02

## 2023-03-16 RX ADMIN — HEPARIN SODIUM 5000 UNIT(S): 5000 INJECTION INTRAVENOUS; SUBCUTANEOUS at 05:46

## 2023-03-16 RX ADMIN — Medication 3 MILLIGRAM(S): at 21:36

## 2023-03-16 RX ADMIN — Medication 81 MILLIGRAM(S): at 12:50

## 2023-03-16 RX ADMIN — Medication 500 MILLIGRAM(S): at 12:50

## 2023-03-16 RX ADMIN — SENNA PLUS 2 TABLET(S): 8.6 TABLET ORAL at 21:37

## 2023-03-16 RX ADMIN — ATORVASTATIN CALCIUM 80 MILLIGRAM(S): 80 TABLET, FILM COATED ORAL at 21:36

## 2023-03-16 RX ADMIN — Medication 1 MILLIGRAM(S): at 12:48

## 2023-03-16 RX ADMIN — Medication 2: at 09:27

## 2023-03-16 RX ADMIN — Medication 4: at 12:46

## 2023-03-16 RX ADMIN — Medication 100 MILLIGRAM(S): at 21:37

## 2023-03-16 RX ADMIN — FAMOTIDINE 10 MILLIGRAM(S): 10 INJECTION INTRAVENOUS at 12:49

## 2023-03-16 RX ADMIN — HEPARIN SODIUM 5000 UNIT(S): 5000 INJECTION INTRAVENOUS; SUBCUTANEOUS at 19:03

## 2023-03-16 RX ADMIN — SEVELAMER CARBONATE 800 MILLIGRAM(S): 2400 POWDER, FOR SUSPENSION ORAL at 12:48

## 2023-03-16 NOTE — PHYSICAL THERAPY INITIAL EVALUATION ADULT - PERTINENT HX OF CURRENT PROBLEM, REHAB EVAL
62-year-old male patient past medical history of yet end-stage renal disease on dialysis MWF, CAD status post stents, CHF, cardiomyopathy, diabetes who presents to the ED for fever that began this morning.  Per wife, patient has been experiencing an unsteady gait since this AM.  Yesterday's HD session ended a little bit early than normal given patient was feeling unwell, and was found hypotensive.  Fever today was 101.4 at home and wife gave Tylenol at the time.  Patient with left heel wound that has been taking care off and dressed daily by wife.  Endorsing mild drainage for the past week, and was seen by podiatry and found with positive heel culture. Hosp course: 3/14: CXR: Bilateral lower lobe patchy opacities, which may be atelectasis or infectious. Xray ankle/foot: 1.  No convincing plain radiographic evidence for acute left calcaneal osteomyelitis. 2.  Moderate arthrosis and moderate hallux valgus at left first MTP with   evidence for gouty arthropathy in the proper clinical setting.

## 2023-03-16 NOTE — PHYSICAL THERAPY INITIAL EVALUATION ADULT - ADDITIONAL COMMENTS
Pt lives in a private home with 3 steps to enter and b/l handrails and 13 steps with b/l handrails to get to bedroom and bathroom. Pt ambulates with RW or straight point cane. Pt owns wheelchair, shower chair, commode and grab bars in the bathroom.

## 2023-03-16 NOTE — PHYSICAL THERAPY INITIAL EVALUATION ADULT - PLANNED THERAPY INTERVENTIONS, PT EVAL
GOAL: Pt will negotiate up/down 13 steps with b/l handrails ascending  independently in 2 weeks/balance training/bed mobility training/gait training/strengthening/transfer training

## 2023-03-16 NOTE — CONSULT NOTE ADULT - ASSESSMENT
62   yr      hx CAD s/p 2 drug eluding stents 11/2022 , was on asa/plavix,       CHF, DM, gout, ESRD on dialysis      c/c  weakness  of   limbs/  neuro ga mccain     has  functional  quadriplegia/  CT  head, . old  arachnoid  cyst/  no intervention     prior   gout, right  wrist, on  prednisone/ colchicine     prior  MRI  head/  C  spine., old  infarcts    s/p falls , in the past ,  CT head  12/2022, R lateral convexity 1.3 cm SDH extending into interhemispheric fissure.          now admitted  with fevers/  low  sbp   CT  head,  SDH, smaller  in size         + Enterorhonovirus          bcx/ ucx,  negative     *     CAD,    s/p    pci. on  asa/ lipitor,   given  sdh          h/o cardiomyopathy.  ef  40     *    CKD,     renal  dr dickerson    *   c/c  anemia     *    DM,  follow  fs              on lantus/  known to  nika talley    *     gout     *     pt  with  h/o intermittent  right  arm /leg weakness  has been   c/c  for  yrs/  with  unclear  etiology     mri head/  c spine in 10/22,  no cord  compression     seen by neuro in  past,   per  wife,  thinks  pt  has  a psychological  component  regarding  his  weakness                          62   yr      hx CAD s/p 2 drug eluding stents 11/2022 , was on asa/plavix,       CHF, DM, gout, ESRD on dialysis      c/c  weakness  of   limbs/  neuro ga mccain     has  functional  quadriplegia/  CT  head, . old  arachnoid  cyst/  no intervention     prior   gout, right  wrist, on  prednisone/ colchicine     prior  MRI  head/  C  spine., old  infarcts    s/p falls , in the past ,  CT head  12/2022, R lateral convexity 1.3 cm SDH extending into interhemispheric fissure.          now admitted  with fevers/  low  sbp   CT  head,  SDH, smaller  in size         + Enterorhonovirus          bcx/ ucx,  negative     *     CAD,    s/p    pci. on  asa/ plavix/   lipitor,           h/o cardiomyopathy.  ef  40     *    CKD,     renal  dr dickerson    *   c/c  anemia     *    DM,  follow  fs              on lantus/  known to  nika talley    *     gout     *     pt  with  h/o intermittent  right  arm /leg weakness  has been   c/c  for  yrs/  with  unclear  etiology     mri head/  c spine in 10/22,  no cord  compression     seen by neuro in  past,   per  wife,  thinks  pt  has  a psychological  component  regarding  his  weakness

## 2023-03-16 NOTE — CONSULT NOTE ADULT - SUBJECTIVE AND OBJECTIVE BOX
62-year-old male      h/o CKD, dialysis MWF,       CAD status post stents, CHF, cardiomyopathy, diabetes     who presents to the ED for fever  Per wife, patient has been experiencing an unsteady gait since this AM.     Yesterday's HD session ended a little bit early than normal given patient was feeling unwell, and was found hypotensive.  Fever today was 101.4 at home and wife gave Tylenol at the time.     left heel wound that has been taking care off and dressed daily by wife.     mild drainage for the past week, and was seen by podiatry and found with positive heel culture.    PAST MEDICAL & SURGICAL HISTORY:  Diabetes Mellitus Type II, Uncontrolled  Dyslipidemia  s/p Angioplasty with Stent  HTN (hypertension)  Gout  Diabetic retinopathy  GERD (gastroesophageal reflux disease)  Nephrolithiasis  Vitamin D deficiency  Hepatitis  CKD (chronic kidney disease)  Ischemic cardiomyopathy  ASHD (arteriosclerotic heart disease)  Pulmonary hypertension  Myocardial infarction  CAD (coronary artery disease)  BPH (benign prostatic hyperplasia)  Retinal Hemorrhage  S/P coronary artery stent placement   TRICIA to Diag ; 2010  LAD; 11 ATOM liberte Diag; 5/15/2017  TRICIA to  diag; 17 PCI with laser and high pressure balloon  History of eye surgery  left eye  H/O lithotripsy  Diabetes with retinopathy  S/P PPV/PRP/GAS  OD 17 for viterous hemorrhage  CHF with cardiomyopathy  Insertion of Cardiomems monitor  Stented coronary artery    MEDICATIONS  (STANDING):  noted  MEDICATIONS  (PRN):      FAMILY HISTORY:  Family history of cardiac disorder in father (Father)    SOCIAL HISTORY:    [x ] Non-smoker  [ ] Smoker  [ ] Alcohol    Allergies    No Known Drug Allergies  shellfish (Unknown)    Intolerances    	    REVIEW OF SYSTEMS:  CONSTITUTIONAL: No fever, weight loss, or fatigue  EYES: No eye pain, visual disturbances, or discharge  ENT:  No difficulty hearing, tinnitus, vertigo; No sinus or throat pain  NECK: No pain or stiffness  RESPIRATORY: No cough, wheezing, chills or hemoptysis; No Shortness of Breath  CARDIOVASCULAR: No chest pain, palpitations, passing out, dizziness, or leg swelling  GASTROINTESTINAL: No abdominal or epigastric pain. No nausea, vomiting, or hematemesis; No diarrhea or constipation. No melena or hematochezia.  GENITOURINARY: No dysuria, frequency, hematuria, or incontinence  NEUROLOGICAL: No headaches, memory loss, loss of strength, numbness, or tremors  SKIN: No itching, burning, rashes, or lesions   LYMPH Nodes: No enlarged glands  ENDOCRINE: No heat or cold intolerance; No hair loss  MUSCULOSKELETAL: No joint pain or swelling; No muscle, back, +extremity pain  PSYCHIATRIC: No depression, anxiety, mood swings, or difficulty sleeping  HEME/LYMPH: No easy bruising, or bleeding gums  ALLERGY AND IMMUNOLOGIC: No hives or eczema	    [ ] All others negative	  [ ] Unable to obtain    PHYSICAL EXAM:  T(C): 37.2 (23 @ 22:37), Max: 38.1 (23 @ 19:25)  HR: 92 (23 @ 22:37) (81 - 111)  BP: 156/85 (23 @ 22:37) (113/57 - 184/93)  RR: 16 (23 @ 22:37) (14 - 20)  SpO2: 95% (23 @ 22:37) (95% - 98%)  Wt(kg): --  I&O's Summary      Appearance: Normal	  HEENT:   Normal oral mucosa, PERRL, EOMI	  Lymphatic: No lymphadenopathy  Cardiovascular: Normal S1 S2, No JVD, No murmurs, No edema  Respiratory: Lungs clear to auscultation	  Psychiatry: A & O x 3, Mood & affect appropriate  Gastrointestinal:  Soft, Non-tender, + BS	  Skin: No rashes, No ecchymoses, No cyanosis	  Neurologic: Non-focal  Extremities: Normal range of motion,  left heel wound w central eschar  Vascular: + pvd    TELEMETRY: 	    ECG:  	  RADIOLOGY:  OTHER: 	  	  LABS:	 	    CARDIAC MARKERS:                              9.1    9.42  )-----------( 162      ( 14 Mar 2023 14:29 )             28.6         137  |  99  |  34<H>  ----------------------------<  281<H>  3.5   |  27  |  3.86<H>    Ca    8.9      14 Mar 2023 14:29    TPro  6.2  /  Alb  3.9  /  TBili  0.3  /  DBili  x   /  AST  7<L>  /  ALT  8<L>  /  AlkPhos  101      proBNP:   Lipid Profile:   HgA1c:   TSH:   PT/INR - ( 14 Mar 2023 14:32 )   PT: 12.6 sec;   INR: 1.09 ratio         PTT - ( 14 Mar 2023 14:32 )  PTT:32.6 sec    PREVIOUS DIAGNOSTIC TESTING:    < from: 12 Lead ECG (. @ 10:00) >  Diagnosis Line NORMAL SINUS RHYTHM  T WAVE ABNORMALITY, CONSIDER LATERAL ISCHEMIA  ABNORMAL ECG  WHEN COMPARED WITH ECG OF 13-DEC-2022  NO SIGNIFICANT CHANGE WAS FOUND    < from: TTE with Doppler (w/Cont) (10.28. @ 12:56) >  1. Moderate segmental left ventricular systolic  dysfunction.  Hypokinenesis of the inferior, inferolateral  wall, apex, apial septum, antewrolateral wall.  Endocardial visualization enhanced with intravenous  injection of Ultrasonic Enhancing Agent (Definity). No left  ventricular thrombus.  2. Increased E/e'  is consistent with elevated left  ventricular filling pressure.  3. The right ventricle is not well visualized; grossly  normal right ventricular systolic function.               (14 Mar 2023 23:12)      REVIEW OF SYSTEMS:  CONSTITUTIONAL: No fever,  no  weight loss  ENT:  No  tinnitus,   no   vertigo  NECK: No pain or stiffness  RESPIRATORY: No cough, wheezing, chills or hemoptysis;    No Shortness of Breath  CARDIOVASCULAR: No chest pain, palpitations, dizziness  GASTROINTESTINAL: No abdominal or epigastric pain. No nausea, vomiting, or hematemesis; No diarrhea  No melena or hematochezia.  GENITOURINARY: No dysuria, frequency, hematuria, or incontinence  NEUROLOGICAL: No headaches  SKIN: No itching,  no   rash  LYMPH Nodes: No enlarged glands  ENDOCRINE: No heat or cold intolerance  MUSCULOSKELETAL: No joint pain or swelling  PSYCHIATRIC: No depression, anxiety  HEME/LYMPH: No easy bruising, or bleeding gums  ALLERGY AND IMMUNOLOGIC: No hives or eczema	    Allergies    No Known Drug Allergies  shellfish (Unknown)    Intolerances        CAPILLARY BLOOD GLUCOSE      POCT Blood Glucose.: 207 mg/dL (16 Mar 2023 08:34)  POCT Blood Glucose.: 275 mg/dL (15 Mar 2023 22:30)  POCT Blood Glucose.: 259 mg/dL (15 Mar 2023 20:56)  POCT Blood Glucose.: 155 mg/dL (15 Mar 2023 17:58)  POCT Blood Glucose.: 222 mg/dL (15 Mar 2023 12:04)    I&O's Summary    15 Mar 2023 07:01  -  16 Mar 2023 07:00  --------------------------------------------------------  IN: 800 mL / OUT: 3000 mL / NET: -2200 mL        Vital Signs Last 24 Hrs  T(C): 36.7 (16 Mar 2023 04:38), Max: 37.4 (15 Mar 2023 13:57)  T(F): 98.1 (16 Mar 2023 04:38), Max: 99.3 (15 Mar 2023 13:57)  HR: 77 (16 Mar 2023 04:38) (77 - 102)  BP: 127/72 (16 Mar 2023 04:38) (125/67 - 151/79)  BP(mean): 86 (15 Mar 2023 21:00) (86 - 86)  RR: 18 (16 Mar 2023 04:38) (16 - 18)  SpO2: 95% (16 Mar 2023 04:38) (94% - 100%)    Parameters below as of 16 Mar 2023 04:38  Patient On (Oxygen Delivery Method): room air        PHYSICAL EXAM:  Appearance: Normal	  HEENT:   Normal oral mucosa, PERRL, EOMI	  Lymphatic: No lymphadenopathy  Cardiovascular: Normal S1 S2, No JVD  Respiratory: Lungs clear to auscultation	  Psychiatry:    Mood & affect appropriate  Gastrointestinal:  Soft, Non-tender, + BS	  Skin: No rash, No ecchymoses	  Extremities: Normal range of motion  Vascular: Peripheral pulses palpable bilaterally    MEDICATIONS  (STANDING):  allopurinol 100 milliGRAM(s) Oral at bedtime  ascorbic acid 500 milliGRAM(s) Oral daily  aspirin enteric coated 81 milliGRAM(s) Oral daily  atorvastatin 80 milliGRAM(s) Oral at bedtime  carvedilol 12.5 milliGRAM(s) Oral every 12 hours  chlorhexidine 4% Liquid 1 Application(s) Topical once  clopidogrel Tablet 75 milliGRAM(s) Oral daily  dextrose 5%. 1000 milliLiter(s) (50 mL/Hr) IV Continuous <Continuous>  dextrose 5%. 1000 milliLiter(s) (100 mL/Hr) IV Continuous <Continuous>  dextrose 50% Injectable 25 Gram(s) IV Push once  dextrose 50% Injectable 12.5 Gram(s) IV Push once  dextrose 50% Injectable 25 Gram(s) IV Push once  famotidine    Tablet 10 milliGRAM(s) Oral daily  folic acid 1 milliGRAM(s) Oral daily  glucagon  Injectable 1 milliGRAM(s) IntraMuscular once  heparin   Injectable 5000 Unit(s) SubCutaneous every 12 hours  insulin lispro (ADMELOG) corrective regimen sliding scale   SubCutaneous at bedtime  insulin lispro (ADMELOG) corrective regimen sliding scale   SubCutaneous three times a day before meals  lidocaine 2% Gel 1 Application(s) Topical <User Schedule>  melatonin 3 milliGRAM(s) Oral at bedtime  piperacillin/tazobactam IVPB.. 3.375 Gram(s) IV Intermittent every 12 hours  pregabalin 75 milliGRAM(s) Oral at bedtime  senna 2 Tablet(s) Oral at bedtime  sevelamer carbonate 800 milliGRAM(s) Oral three times a day with meals  tamsulosin 0.4 milliGRAM(s) Oral at bedtime  valsartan 40 milliGRAM(s) Oral daily    MEDICATIONS  (PRN):  acetaminophen     Tablet .. 650 milliGRAM(s) Oral every 6 hours PRN Temp greater or equal to 38C (100.4F), Moderate Pain (4 - 6)  dextrose Oral Gel 15 Gram(s) Oral once PRN Blood Glucose LESS THAN 70 milliGRAM(s)/deciliter    LABS:                        9.1    9.42  )-----------( 162      ( 14 Mar 2023 14:29 )             28.6     03-14    137  |  99  |  34<H>  ----------------------------<  281<H>  3.5   |  27  |  3.86<H>    Ca    8.9      14 Mar 2023 14:29    TPro  6.2  /  Alb  3.9  /  TBili  0.3  /  DBili  x   /  AST  7<L>  /  ALT  8<L>  /  AlkPhos  101  03-14    PT/INR - ( 14 Mar 2023 14:32 )   PT: 12.6 sec;   INR: 1.09 ratio         PTT - ( 14 Mar 2023 14:32 )  PTT:32.6 sec      Urinalysis Basic - ( 14 Mar 2023 17:20 )    Color: Light Yellow / Appearance: Clear / S.010 / pH: x  Gluc: x / Ketone: Negative  / Bili: Negative / Urobili: Negative   Blood: x / Protein: 300 mg/dL / Nitrite: Negative   Leuk Esterase: Negative / RBC: 4 /hpf / WBC 1 /HPF   Sq Epi: x / Non Sq Epi: 0 /hpf / Bacteria: Negative           @ 13:57  3.4  55      Thyroid Stimulating Hormone, Serum: 2.03 uIU/mL ( @ 06:43)          Consultant(s) Notes Reviewed:      Care Discussed with Consultants/Other Providers:  Plan:

## 2023-03-17 LAB
ALBUMIN SERPL ELPH-MCNC: 3.5 G/DL — SIGNIFICANT CHANGE UP (ref 3.3–5)
ALP SERPL-CCNC: 99 U/L — SIGNIFICANT CHANGE UP (ref 40–120)
ALT FLD-CCNC: <5 U/L — LOW (ref 10–45)
ANION GAP SERPL CALC-SCNC: 17 MMOL/L — SIGNIFICANT CHANGE UP (ref 5–17)
AST SERPL-CCNC: 8 U/L — LOW (ref 10–40)
BILIRUB SERPL-MCNC: 0.3 MG/DL — SIGNIFICANT CHANGE UP (ref 0.2–1.2)
BUN SERPL-MCNC: 61 MG/DL — HIGH (ref 7–23)
CALCIUM SERPL-MCNC: 7.9 MG/DL — LOW (ref 8.4–10.5)
CHLORIDE SERPL-SCNC: 88 MMOL/L — LOW (ref 96–108)
CO2 SERPL-SCNC: 24 MMOL/L — SIGNIFICANT CHANGE UP (ref 22–31)
CREAT SERPL-MCNC: 7.94 MG/DL — HIGH (ref 0.5–1.3)
EGFR: 7 ML/MIN/1.73M2 — LOW
GLUCOSE BLDC GLUCOMTR-MCNC: 168 MG/DL — HIGH (ref 70–99)
GLUCOSE BLDC GLUCOMTR-MCNC: 240 MG/DL — HIGH (ref 70–99)
GLUCOSE BLDC GLUCOMTR-MCNC: 284 MG/DL — HIGH (ref 70–99)
GLUCOSE BLDC GLUCOMTR-MCNC: 295 MG/DL — HIGH (ref 70–99)
GLUCOSE BLDC GLUCOMTR-MCNC: 322 MG/DL — HIGH (ref 70–99)
GLUCOSE SERPL-MCNC: 232 MG/DL — HIGH (ref 70–99)
HCT VFR BLD CALC: 27.3 % — LOW (ref 39–50)
HGB BLD-MCNC: 9 G/DL — LOW (ref 13–17)
MAGNESIUM SERPL-MCNC: 2.2 MG/DL — SIGNIFICANT CHANGE UP (ref 1.6–2.6)
MCHC RBC-ENTMCNC: 28.8 PG — SIGNIFICANT CHANGE UP (ref 27–34)
MCHC RBC-ENTMCNC: 33 GM/DL — SIGNIFICANT CHANGE UP (ref 32–36)
MCV RBC AUTO: 87.5 FL — SIGNIFICANT CHANGE UP (ref 80–100)
NRBC # BLD: 0 /100 WBCS — SIGNIFICANT CHANGE UP (ref 0–0)
PHOSPHATE SERPL-MCNC: 5.7 MG/DL — HIGH (ref 2.5–4.5)
PLATELET # BLD AUTO: 208 K/UL — SIGNIFICANT CHANGE UP (ref 150–400)
POTASSIUM SERPL-MCNC: 4.4 MMOL/L — SIGNIFICANT CHANGE UP (ref 3.5–5.3)
POTASSIUM SERPL-SCNC: 4.4 MMOL/L — SIGNIFICANT CHANGE UP (ref 3.5–5.3)
PROT SERPL-MCNC: 6 G/DL — SIGNIFICANT CHANGE UP (ref 6–8.3)
RBC # BLD: 3.12 M/UL — LOW (ref 4.2–5.8)
RBC # FLD: 14.9 % — HIGH (ref 10.3–14.5)
SODIUM SERPL-SCNC: 129 MMOL/L — LOW (ref 135–145)
WBC # BLD: 9.83 K/UL — SIGNIFICANT CHANGE UP (ref 3.8–10.5)
WBC # FLD AUTO: 9.83 K/UL — SIGNIFICANT CHANGE UP (ref 3.8–10.5)

## 2023-03-17 PROCEDURE — 99254 IP/OBS CNSLTJ NEW/EST MOD 60: CPT

## 2023-03-17 RX ORDER — ERYTHROPOIETIN 10000 [IU]/ML
10000 INJECTION, SOLUTION INTRAVENOUS; SUBCUTANEOUS ONCE
Refills: 0 | Status: COMPLETED | OUTPATIENT
Start: 2023-03-17 | End: 2023-03-17

## 2023-03-17 RX ADMIN — SENNA PLUS 2 TABLET(S): 8.6 TABLET ORAL at 21:03

## 2023-03-17 RX ADMIN — HEPARIN SODIUM 5000 UNIT(S): 5000 INJECTION INTRAVENOUS; SUBCUTANEOUS at 18:09

## 2023-03-17 RX ADMIN — ATORVASTATIN CALCIUM 80 MILLIGRAM(S): 80 TABLET, FILM COATED ORAL at 21:03

## 2023-03-17 RX ADMIN — Medication 3: at 08:40

## 2023-03-17 RX ADMIN — VALSARTAN 40 MILLIGRAM(S): 80 TABLET ORAL at 06:06

## 2023-03-17 RX ADMIN — Medication 500 MILLIGRAM(S): at 15:38

## 2023-03-17 RX ADMIN — FAMOTIDINE 10 MILLIGRAM(S): 10 INJECTION INTRAVENOUS at 12:29

## 2023-03-17 RX ADMIN — PIPERACILLIN AND TAZOBACTAM 25 GRAM(S): 4; .5 INJECTION, POWDER, LYOPHILIZED, FOR SOLUTION INTRAVENOUS at 06:06

## 2023-03-17 RX ADMIN — Medication 650 MILLIGRAM(S): at 23:30

## 2023-03-17 RX ADMIN — HEPARIN SODIUM 5000 UNIT(S): 5000 INJECTION INTRAVENOUS; SUBCUTANEOUS at 06:06

## 2023-03-17 RX ADMIN — Medication 650 MILLIGRAM(S): at 22:43

## 2023-03-17 RX ADMIN — Medication 100 MILLIGRAM(S): at 21:02

## 2023-03-17 RX ADMIN — Medication 1 MILLIGRAM(S): at 12:29

## 2023-03-17 RX ADMIN — Medication 3: at 12:31

## 2023-03-17 RX ADMIN — TAMSULOSIN HYDROCHLORIDE 0.4 MILLIGRAM(S): 0.4 CAPSULE ORAL at 21:03

## 2023-03-17 RX ADMIN — Medication 3 MILLIGRAM(S): at 21:03

## 2023-03-17 RX ADMIN — CLOPIDOGREL BISULFATE 75 MILLIGRAM(S): 75 TABLET, FILM COATED ORAL at 12:29

## 2023-03-17 RX ADMIN — SEVELAMER CARBONATE 800 MILLIGRAM(S): 2400 POWDER, FOR SUSPENSION ORAL at 12:29

## 2023-03-17 RX ADMIN — Medication 75 MILLIGRAM(S): at 21:02

## 2023-03-17 RX ADMIN — SEVELAMER CARBONATE 800 MILLIGRAM(S): 2400 POWDER, FOR SUSPENSION ORAL at 08:42

## 2023-03-17 RX ADMIN — SEVELAMER CARBONATE 800 MILLIGRAM(S): 2400 POWDER, FOR SUSPENSION ORAL at 18:08

## 2023-03-17 RX ADMIN — PIPERACILLIN AND TAZOBACTAM 25 GRAM(S): 4; .5 INJECTION, POWDER, LYOPHILIZED, FOR SOLUTION INTRAVENOUS at 18:08

## 2023-03-17 RX ADMIN — LIDOCAINE 1 APPLICATION(S): 4 CREAM TOPICAL at 20:36

## 2023-03-17 RX ADMIN — CARVEDILOL PHOSPHATE 12.5 MILLIGRAM(S): 80 CAPSULE, EXTENDED RELEASE ORAL at 06:06

## 2023-03-17 RX ADMIN — Medication 4: at 18:08

## 2023-03-17 RX ADMIN — ERYTHROPOIETIN 10000 UNIT(S): 10000 INJECTION, SOLUTION INTRAVENOUS; SUBCUTANEOUS at 22:43

## 2023-03-17 RX ADMIN — Medication 81 MILLIGRAM(S): at 12:28

## 2023-03-17 NOTE — CONSULT NOTE ADULT - ATTENDING COMMENTS
63 yo M with a PMH of ESRD on M/W/F, CAD s/p stent, CHF, CM, diabetes, who presented to the ED with an episode of fever.   RVP with enterorhinovirus   Outpatient wound culture with Staph lugdunensis  Wound does not look obvious infected now  Suspect fever was secondary to Enterorhinovirus  Can empirically complete 7 day total course of antibiotics with Cephalexin    I will sign off at this time. Please feel free to contact me with any further questions or concerns.    Jayesh Merchant M.D.  [facility] Division of Infectious Disease  8AM-5PM Monday - Friday: Available on [employer] Teams  After Hours and Holidays (or if no response on [employer] Teams): Please contact the Infectious Diseases Office at [phone]

## 2023-03-17 NOTE — CONSULT NOTE ADULT - ASSESSMENT
61 yo M with a PMH of ESRD on M/W/F, CAD s/p stent, CHF, CM, diabetes, who presented to the ED with an episode of fever.     One fever episode in ED, T max 100.5, no further fevers since   RVP + for Rhino/Enterovirus   No leukocytosis  Blood and urine cultures negative  XR with b/l lower lobe opacities (atelectasis)  On Zosyn since admission  L heel ulcer  Wound cx from HIE (source not mentioned) with Staph Lugdunensis (pan-sensitive) and Staph Epi  On exam, L heel ulcer noted with dry base and eschar  No erythema, no discharge, low suspicion for active infection   Imaging of L heel (XR) with no evidence of acute infection  Zosyn 3/14-    OVERALL  Suspect fever was from Rhino/Enterovirus - supportive care - resolved   Blood cultures and urine culture negative to date  L heel ulcer with dry base and eschar, no evidence of active infection, XR also negative for acute infection    RECOMMENDATIONS  -Stop Zosyn  -Local wound care for L heel ulcer as per Primary Team   -Outpatient Podiatry follow up     All recommendations are tentative pending Attending Attestation.    Sophia Serrano MD, PGY-4   ID Fellow  [employer] Teams Preferred  After 5pm/weekends call [phone]  61 yo M with a PMH of ESRD on M/W/F, CAD s/p stent, CHF, CM, diabetes, who presented to the ED with an episode of fever.     One fever episode in ED, T max 100.5, no further fevers since   RVP + for Rhino/Enterovirus   No leukocytosis  Blood and urine cultures negative  XR with b/l lower lobe opacities (atelectasis)  On Zosyn since admission  L heel ulcer  Wound cx from HIE (source not mentioned) with Staph Lugdunensis (pan-sensitive) and Staph Epi  On exam, L heel ulcer noted with dry base and eschar  No erythema, no discharge, low suspicion for active infection   Imaging of L heel (XR) with no evidence of acute infection  Zosyn 3/14-    OVERALL  Suspect fever was from Rhino/Enterovirus - supportive care - resolved   Blood cultures and urine culture negative to date  L heel ulcer with dry base and eschar, no evidence of active infection, XR also negative for acute infection    RECOMMENDATIONS  -Stop Zosyn  -Local wound care for L heel ulcer as per Primary Team   -Outpatient Podiatry follow up     All recommendations are tentative pending Attending Attestation.    Sophia Serrano MD, PGY-4   ID Fellow  [employer] Teams Preferred  After 5pm/weekends call [phone]  61 yo M with a PMH of ESRD on M/W/F, CAD s/p stent, CHF, CM, diabetes, who presented to the ED with an episode of fever.     One fever episode in ED, T max 100.5, no further fevers since   RVP + for Rhino/Enterovirus   No leukocytosis  Blood and urine cultures negative  XR with b/l lower lobe opacities (atelectasis)  On Zosyn since admission  L heel ulcer  Wound cx from HIE (source not mentioned) with Staph Lugdunensis (pan-sensitive) and Staph Epi  On exam, L heel ulcer noted with dry base and eschar  No erythema, no discharge, low suspicion for active infection   Imaging of L heel (XR) with no evidence of acute infection  Zosyn 3/14-    OVERALL  Suspect fever was from Rhino/Enterovirus - supportive care - resolved   Blood cultures and urine culture negative to date  L heel ulcer with dry base and eschar, no evidence of active infection, XR also negative for acute infection    RECOMMENDATIONS  -Stop Zosyn  -Reasonable to complete 7 day course total of antibiotics (including 3 days received of Zosyn) - can discharge on 4 days of PO Keflex (500mg Q24h, on dialysis days to take post-HD).    -Local wound care for L heel ulcer as per Primary Team   -Outpatient Podiatry follow up     Sophia Serrano MD, PGY-4   ID Fellow  [employer] Teams Preferred  After 5pm/weekends call [phone]

## 2023-03-17 NOTE — CONSULT NOTE ADULT - SUBJECTIVE AND OBJECTIVE BOX
Patient is a 61 yo Male who presents with a chief complaint of fever     HPI:  61 yo M with a PMH of ESRD on M/W/F, CAD s/p stent, CHF, CM, diabetes, who presented to the ED with an episode of fever.     In ED, T max 100.5, no further fevers since. No leukocytosis. RVP + for Rhino/Enterovirus. CXR with b/l lower lobe opacities (atelectasis). Blood and urine cultures negative. On Zosyn since admission.     Of note, patient has a L heel ulcer for which he has been seeing Podiatry - appears to have a + wound culture from [facility] (although source not mentioned) from 3/8 with Staph Lugdunensis (S to Oxacillin) and Staph Epidermidis. Imaging of L heel (XR) with no evidence of acute infection. On exam, L heel ulcer noted with dry base and eschar, no signs of infection on my clinical exam.     REVIEW OF SYSTEMS  Constitutional: No fevers recently   Skin: No rash, no phlebitis  Eyes: No discharge	  ENMT: No sore throat, oral thrush, ulcers or exudate  Respiratory: No cough, no SOB  Cardiovascular:  No chest pain, palpitations or edema   Gastrointestinal: No pain, nausea, vomiting, diarrhea or constipation	  Genitourinary: No dysuria, discharge or flank pain  MSK: No arthralgias or back pain   Neurological: No HA, no weakness, no seizures, no AMS     prior hospital charts reviewed [V]  primary team notes reviewed [V]  other consultant notes reviewed [V]    PAST MEDICAL & SURGICAL HISTORY:  Diabetes Mellitus Type II, Uncontrolled    Dyslipidemia    s/p Angioplasty with Stent    HTN (hypertension)    Gout    Diabetic retinopathy    GERD (gastroesophageal reflux disease)    Nephrolithiasis    Vitamin D deficiency    Hepatitis    CKD (chronic kidney disease)    Ischemic cardiomyopathy    ASHD (arteriosclerotic heart disease)    Pulmonary hypertension    Myocardial infarction    CAD (coronary artery disease)    BPH (benign prostatic hyperplasia)    Retinal Hemorrhage    S/P coronary artery stent placement  2010 TRICIA to Diag ; 11/18/2010  LAD; 2/4/11 ATOM liberte Diag; 5/15/2017  TRICIA to 1st diag; 6/7/17 PCI with laser and high pressure balloon    History of eye surgery  left eye    H/O lithotripsy    Diabetes with retinopathy  S/P PPV/PRP/GAS  OD 2/22/17 for viterous hemorrhage    CHF with cardiomyopathy  Insertion of Cardiomems monitor    Stented coronary artery    SOCIAL HISTORY:  - Denied smoking/vaping/alcohol/recreational drug use    FAMILY HISTORY:  Family history of cardiac disorder in father (Father)    Allergies  No Known Drug Allergies  shellfish (Unknown)    ANTIMICROBIALS:  piperacillin/tazobactam IVPB.. 3.375 every 12 hours    ANTIMICROBIALS (past 90 days):  MEDICATIONS  (STANDING):  piperacillin/tazobactam IVPB..   25 mL/Hr IV Intermittent (03-17-23 @ 06:06)   25 mL/Hr IV Intermittent (03-16-23 @ 19:11)   25 mL/Hr IV Intermittent (03-16-23 @ 05:47)   25 mL/Hr IV Intermittent (03-15-23 @ 21:15)   25 mL/Hr IV Intermittent (03-15-23 @ 05:10)    piperacillin/tazobactam IVPB...   200 mL/Hr IV Intermittent (03-14-23 @ 13:54)    vancomycin  IVPB.   250 mL/Hr IV Intermittent (03-14-23 @ 14:36)    OTHER MEDS:   MEDICATIONS  (STANDING):  acetaminophen     Tablet .. 650 every 6 hours PRN  allopurinol 100 at bedtime  aspirin enteric coated 81 daily  atorvastatin 80 at bedtime  carvedilol 12.5 every 12 hours  clopidogrel Tablet 75 daily  dextrose 50% Injectable 25 once  dextrose 50% Injectable 12.5 once  dextrose 50% Injectable 25 once  dextrose Oral Gel 15 once PRN  epoetin wayne-epbx (RETACRIT) Injectable 76541 once  famotidine    Tablet 10 daily  glucagon  Injectable 1 once  heparin   Injectable 5000 every 12 hours  insulin lispro (ADMELOG) corrective regimen sliding scale  three times a day before meals  insulin lispro (ADMELOG) corrective regimen sliding scale  at bedtime  melatonin 3 at bedtime  pregabalin 75 at bedtime  senna 2 at bedtime  tamsulosin 0.4 at bedtime  valsartan 40 daily    VITALS:  Vital Signs Last 24 Hrs  T(F): 98.8 (03-17-23 @ 05:15), Max: 100.5 (03-14-23 @ 19:25)    Vital Signs Last 24 Hrs  HR: 73 (03-17-23 @ 05:15) (63 - 76)  BP: 141/73 (03-17-23 @ 05:15) (129/70 - 148/79)  RR: 18 (03-17-23 @ 05:15)  SpO2: 98% (03-17-23 @ 05:15) (95% - 98%)  Wt(kg): --    EXAM:  General: Patient in no acute distress   HEENT: NCAT, EOMI  CV: S1+S2  Lungs: No respiratory distress, CTAB, on room air   Abd: Soft, nontender, no guarding, no rebound tenderness  Ext: No cyanosis  L heel ulcer with dry base and eschar - no surrounding erythema, no discharge, no signs of infection on clinical exam   LUE AVF present  Neuro: Alert and oriented, no focal deficits  Skin: No rash   IV: No phlebitis    Labs:    WBC Trend:  WBC Count: 9.42 (03-14-23 @ 14:29)    Auto Neutrophil #: 7.53 K/uL (03-14-23 @ 14:29)  Auto Neutrophil #: 5.03 K/uL (01-02-23 @ 06:07)  Auto Neutrophil #: 4.88 K/uL (01-01-23 @ 06:15)  Auto Neutrophil #: 7.52 K/uL (12-13-22 @ 05:01)  Auto Neutrophil #: 7.81 K/uL (12-12-22 @ 17:23)    Creatine Trend:  Creatinine, Serum: 3.86 (03-14)    Liver Biochemical Testing Trend:  Alanine Aminotransferase (ALT/SGPT): 8 *L* (03-14)  Alanine Aminotransferase (ALT/SGPT): 10 (01-26)  Alanine Aminotransferase (ALT/SGPT): 10 (01-24)  Alanine Aminotransferase (ALT/SGPT): 14 (01-19)  Alanine Aminotransferase (ALT/SGPT): 10 (01-17)  Aspartate Aminotransferase (AST/SGOT): 7 (03-14-23 @ 14:29)  Aspartate Aminotransferase (AST/SGOT): 14 (01-26-23 @ 14:17)  Aspartate Aminotransferase (AST/SGOT): 14 (01-24-23 @ 14:17)  Aspartate Aminotransferase (AST/SGOT): 13 (01-19-23 @ 14:30)  Aspartate Aminotransferase (AST/SGOT): 10 (01-17-23 @ 14:47)  Bilirubin Total, Serum: 0.3 (03-14)  Bilirubin Total, Serum: 0.3 (01-26)  Bilirubin Total, Serum: 0.3 (01-24)  Bilirubin Total, Serum: 0.2 (01-19)  Bilirubin Total, Serum: 0.3 (01-17)    Auto Eosinophil %: 1.5 % (03-14-23 @ 14:29)    MICROBIOLOGY:    MRSA PCR Result.: NotDetec (11-11-22 @ 12:39)    Culture - Urine (collected 14 Mar 2023 17:23)  Source: Clean Catch Clean Catch (Midstream)  Final Report:    <10,000 CFU/mL Normal Urogenital Kandy    Culture - Blood (collected 14 Mar 2023 13:55)  Source: .Blood Blood-Peripheral  Preliminary Report:    No growth to date.    Culture - Blood (collected 14 Mar 2023 13:40)  Source: .Blood Blood-Peripheral  Preliminary Report:    No growth to date.    Culture - Blood (collected 13 Dec 2022 05:15)  Source: .Blood Blood-Peripheral  Final Report:    No Growth Final    Culture - Blood (collected 13 Dec 2022 05:15)  Source: .Blood Blood-Peripheral  Final Report:    No Growth Final    Culture - Urine (collected 22 Oct 2022 12:47)  Source: Clean Catch Clean Catch (Midstream)  Final Report:    No growth    Culture - Blood (collected 22 Oct 2022 12:32)  Source: .Blood Blood-Peripheral  Final Report:    No Growth Final    Culture - Blood (collected 22 Oct 2022 12:20)  Source: .Blood Blood-Peripheral  Final Report:    No Growth Final    Culture - Urine (collected 07 Dec 2021 03:17)  Source: Clean Catch Clean Catch (Midstream)  Final Report:    <10,000 CFU/mL Normal Urogenital Kandy    HIV-1/2 Combo Result: Nonreact (12-29-22 @ 06:43)    Rapid RVP Result: Detected (03-14 @ 14:28)    COVID-19 PCR: NotDetec (01-25-23 @ 15:50)    Procalcitonin, Serum: 0.52 (03-14)    C-Reactive Protein, Serum: 21 (03-14)    Blood Gas Venous - Lactate: 1.6 (03-14 @ 13:57)    A1C with Estimated Average Glucose Result: 7.0 % (03-15-23 @ 07:27)  A1C with Estimated Average Glucose Result: 7.9 % (01-01-23 @ 06:15)  A1C with Estimated Average Glucose Result: 11.8 % (10-23-22 @ 14:08)    RADIOLOGY:  imaging below personally reviewed    < from: Xray Foot AP + Lateral + Oblique, Left (03.14.23 @ 13:47) >  IMPRESSION:  1.  No convincing plain radiographic evidence for acute left calcaneal   osteomyelitis.  2.  Moderate arthrosis and moderate hallux valgus at left first MTP with   evidence for gouty arthropathy in the proper clinical setting.    --- End of Report ---    < end of copied text >    < from: Xray Chest 1 View- PORTABLE-Urgent (03.14.23 @ 13:46) >  IMPRESSION:  Bilateral lower lobe patchy opacities, which may be atelectasis or   infectious.    --- End of Report ---    < end of copied text > Patient is a 61 yo Male who presents with a chief complaint of fever     HPI:    61 yo M with a PMH of ESRD on M/W/F, CAD s/p stent, CHF, CM, diabetes, who presented to the ED with an episode of fever.     In ED, T max 100.5, no further fevers since. No leukocytosis. RVP + for Rhino/Enterovirus. CXR with b/l lower lobe opacities (atelectasis). Blood and urine cultures negative. On Zosyn since admission.     Of note, patient has a L heel ulcer for which he has been seeing Podiatry - appears to have a + wound culture from [facility] (although source not mentioned) from 3/8 with Staph Lugdunensis (S to Oxacillin) and Staph Epidermidis. Imaging of L heel (XR) with no evidence of acute infection. On exam, L heel ulcer noted with dry base and eschar, no signs of infection on my clinical exam.     REVIEW OF SYSTEMS  Constitutional: No fevers recently   Skin: No rash, no phlebitis  Eyes: No discharge	  ENMT: No sore throat, oral thrush, ulcers or exudate  Respiratory: No cough, no SOB  Cardiovascular:  No chest pain, palpitations or edema   Gastrointestinal: No pain, nausea, vomiting, diarrhea or constipation	  Genitourinary: No dysuria, discharge or flank pain  MSK: No arthralgias or back pain   Neurological: No HA, no weakness, no seizures, no AMS     prior hospital charts reviewed [V]  primary team notes reviewed [V]  other consultant notes reviewed [V]    PAST MEDICAL & SURGICAL HISTORY:  Diabetes Mellitus Type II, Uncontrolled    Dyslipidemia    s/p Angioplasty with Stent    HTN (hypertension)    Gout    Diabetic retinopathy    GERD (gastroesophageal reflux disease)    Nephrolithiasis    Vitamin D deficiency    Hepatitis    CKD (chronic kidney disease)    Ischemic cardiomyopathy    ASHD (arteriosclerotic heart disease)    Pulmonary hypertension    Myocardial infarction    CAD (coronary artery disease)    BPH (benign prostatic hyperplasia)    Retinal Hemorrhage    S/P coronary artery stent placement  2010 TRICIA to Diag ; 11/18/2010  LAD; 2/4/11 ATOM liberte Diag; 5/15/2017  TRICIA to 1st diag; 6/7/17 PCI with laser and high pressure balloon    History of eye surgery  left eye    H/O lithotripsy    Diabetes with retinopathy  S/P PPV/PRP/GAS  OD 2/22/17 for viterous hemorrhage    CHF with cardiomyopathy  Insertion of Cardiomems monitor    Stented coronary artery    SOCIAL HISTORY:  - Denied smoking/vaping/alcohol/recreational drug use    FAMILY HISTORY:  Family history of cardiac disorder in father (Father)    Allergies  No Known Drug Allergies  shellfish (Unknown)    ANTIMICROBIALS:  piperacillin/tazobactam IVPB.. 3.375 every 12 hours    ANTIMICROBIALS (past 90 days):  MEDICATIONS  (STANDING):  piperacillin/tazobactam IVPB..   25 mL/Hr IV Intermittent (03-17-23 @ 06:06)   25 mL/Hr IV Intermittent (03-16-23 @ 19:11)   25 mL/Hr IV Intermittent (03-16-23 @ 05:47)   25 mL/Hr IV Intermittent (03-15-23 @ 21:15)   25 mL/Hr IV Intermittent (03-15-23 @ 05:10)    piperacillin/tazobactam IVPB...   200 mL/Hr IV Intermittent (03-14-23 @ 13:54)    vancomycin  IVPB.   250 mL/Hr IV Intermittent (03-14-23 @ 14:36)    OTHER MEDS:   MEDICATIONS  (STANDING):  acetaminophen     Tablet .. 650 every 6 hours PRN  allopurinol 100 at bedtime  aspirin enteric coated 81 daily  atorvastatin 80 at bedtime  carvedilol 12.5 every 12 hours  clopidogrel Tablet 75 daily  dextrose 50% Injectable 25 once  dextrose 50% Injectable 12.5 once  dextrose 50% Injectable 25 once  dextrose Oral Gel 15 once PRN  epoetin wayne-epbx (RETACRIT) Injectable 42889 once  famotidine    Tablet 10 daily  glucagon  Injectable 1 once  heparin   Injectable 5000 every 12 hours  insulin lispro (ADMELOG) corrective regimen sliding scale  three times a day before meals  insulin lispro (ADMELOG) corrective regimen sliding scale  at bedtime  melatonin 3 at bedtime  pregabalin 75 at bedtime  senna 2 at bedtime  tamsulosin 0.4 at bedtime  valsartan 40 daily    VITALS:  Vital Signs Last 24 Hrs  T(F): 98.8 (03-17-23 @ 05:15), Max: 100.5 (03-14-23 @ 19:25)    Vital Signs Last 24 Hrs  HR: 73 (03-17-23 @ 05:15) (63 - 76)  BP: 141/73 (03-17-23 @ 05:15) (129/70 - 148/79)  RR: 18 (03-17-23 @ 05:15)  SpO2: 98% (03-17-23 @ 05:15) (95% - 98%)  Wt(kg): --    EXAM:  General: Patient in no acute distress   HEENT: NCAT, EOMI  CV: S1+S2  Lungs: No respiratory distress, CTAB, on room air   Abd: Soft, nontender, no guarding, no rebound tenderness  Ext: No cyanosis  L heel ulcer with dry base and eschar - no surrounding erythema, no discharge, no signs of infection on clinical exam   LUE AVF present  Neuro: Alert and oriented, no focal deficits  Skin: No rash   IV: No phlebitis    Labs:    WBC Trend:  WBC Count: 9.42 (03-14-23 @ 14:29)    Auto Neutrophil #: 7.53 K/uL (03-14-23 @ 14:29)  Auto Neutrophil #: 5.03 K/uL (01-02-23 @ 06:07)  Auto Neutrophil #: 4.88 K/uL (01-01-23 @ 06:15)  Auto Neutrophil #: 7.52 K/uL (12-13-22 @ 05:01)  Auto Neutrophil #: 7.81 K/uL (12-12-22 @ 17:23)    Creatine Trend:  Creatinine, Serum: 3.86 (03-14)    Liver Biochemical Testing Trend:  Alanine Aminotransferase (ALT/SGPT): 8 *L* (03-14)  Alanine Aminotransferase (ALT/SGPT): 10 (01-26)  Alanine Aminotransferase (ALT/SGPT): 10 (01-24)  Alanine Aminotransferase (ALT/SGPT): 14 (01-19)  Alanine Aminotransferase (ALT/SGPT): 10 (01-17)  Aspartate Aminotransferase (AST/SGOT): 7 (03-14-23 @ 14:29)  Aspartate Aminotransferase (AST/SGOT): 14 (01-26-23 @ 14:17)  Aspartate Aminotransferase (AST/SGOT): 14 (01-24-23 @ 14:17)  Aspartate Aminotransferase (AST/SGOT): 13 (01-19-23 @ 14:30)  Aspartate Aminotransferase (AST/SGOT): 10 (01-17-23 @ 14:47)  Bilirubin Total, Serum: 0.3 (03-14)  Bilirubin Total, Serum: 0.3 (01-26)  Bilirubin Total, Serum: 0.3 (01-24)  Bilirubin Total, Serum: 0.2 (01-19)  Bilirubin Total, Serum: 0.3 (01-17)    Auto Eosinophil %: 1.5 % (03-14-23 @ 14:29)    MICROBIOLOGY:    MRSA PCR Result.: NotDetec (11-11-22 @ 12:39)    Culture - Urine (collected 14 Mar 2023 17:23)  Source: Clean Catch Clean Catch (Midstream)  Final Report:    <10,000 CFU/mL Normal Urogenital Kandy    Culture - Blood (collected 14 Mar 2023 13:55)  Source: .Blood Blood-Peripheral  Preliminary Report:    No growth to date.    Culture - Blood (collected 14 Mar 2023 13:40)  Source: .Blood Blood-Peripheral  Preliminary Report:    No growth to date.    Culture - Blood (collected 13 Dec 2022 05:15)  Source: .Blood Blood-Peripheral  Final Report:    No Growth Final    Culture - Blood (collected 13 Dec 2022 05:15)  Source: .Blood Blood-Peripheral  Final Report:    No Growth Final    Culture - Urine (collected 22 Oct 2022 12:47)  Source: Clean Catch Clean Catch (Midstream)  Final Report:    No growth    Culture - Blood (collected 22 Oct 2022 12:32)  Source: .Blood Blood-Peripheral  Final Report:    No Growth Final    Culture - Blood (collected 22 Oct 2022 12:20)  Source: .Blood Blood-Peripheral  Final Report:    No Growth Final    Culture - Urine (collected 07 Dec 2021 03:17)  Source: Clean Catch Clean Catch (Midstream)  Final Report:    <10,000 CFU/mL Normal Urogenital Kandy    HIV-1/2 Combo Result: Nonreact (12-29-22 @ 06:43)    Rapid RVP Result: Detected (03-14 @ 14:28)    COVID-19 PCR: NotDetec (01-25-23 @ 15:50)    Procalcitonin, Serum: 0.52 (03-14)    C-Reactive Protein, Serum: 21 (03-14)    Blood Gas Venous - Lactate: 1.6 (03-14 @ 13:57)    A1C with Estimated Average Glucose Result: 7.0 % (03-15-23 @ 07:27)  A1C with Estimated Average Glucose Result: 7.9 % (01-01-23 @ 06:15)  A1C with Estimated Average Glucose Result: 11.8 % (10-23-22 @ 14:08)    RADIOLOGY:  imaging below personally reviewed    < from: Xray Foot AP + Lateral + Oblique, Left (03.14.23 @ 13:47) >  IMPRESSION:  1.  No convincing plain radiographic evidence for acute left calcaneal   osteomyelitis.  2.  Moderate arthrosis and moderate hallux valgus at left first MTP with   evidence for gouty arthropathy in the proper clinical setting.    --- End of Report ---    < end of copied text >    < from: Xray Chest 1 View- PORTABLE-Urgent (03.14.23 @ 13:46) >  IMPRESSION:  Bilateral lower lobe patchy opacities, which may be atelectasis or   infectious.    --- End of Report ---    < end of copied text >

## 2023-03-18 LAB
ANION GAP SERPL CALC-SCNC: 16 MMOL/L — SIGNIFICANT CHANGE UP (ref 5–17)
BUN SERPL-MCNC: 35 MG/DL — HIGH (ref 7–23)
CALCIUM SERPL-MCNC: 8.6 MG/DL — SIGNIFICANT CHANGE UP (ref 8.4–10.5)
CHLORIDE SERPL-SCNC: 93 MMOL/L — LOW (ref 96–108)
CO2 SERPL-SCNC: 24 MMOL/L — SIGNIFICANT CHANGE UP (ref 22–31)
CREAT SERPL-MCNC: 5.35 MG/DL — HIGH (ref 0.5–1.3)
EGFR: 11 ML/MIN/1.73M2 — LOW
GLUCOSE BLDC GLUCOMTR-MCNC: 146 MG/DL — HIGH (ref 70–99)
GLUCOSE BLDC GLUCOMTR-MCNC: 216 MG/DL — HIGH (ref 70–99)
GLUCOSE BLDC GLUCOMTR-MCNC: 258 MG/DL — HIGH (ref 70–99)
GLUCOSE BLDC GLUCOMTR-MCNC: 264 MG/DL — HIGH (ref 70–99)
GLUCOSE SERPL-MCNC: 211 MG/DL — HIGH (ref 70–99)
PHOSPHATE SERPL-MCNC: 4 MG/DL — SIGNIFICANT CHANGE UP (ref 2.5–4.5)
POTASSIUM SERPL-MCNC: 4.2 MMOL/L — SIGNIFICANT CHANGE UP (ref 3.5–5.3)
POTASSIUM SERPL-SCNC: 4.2 MMOL/L — SIGNIFICANT CHANGE UP (ref 3.5–5.3)
SODIUM SERPL-SCNC: 133 MMOL/L — LOW (ref 135–145)

## 2023-03-18 RX ORDER — CEPHALEXIN 500 MG
500 CAPSULE ORAL DAILY
Refills: 0 | Status: DISCONTINUED | OUTPATIENT
Start: 2023-03-19 | End: 2023-03-20

## 2023-03-18 RX ORDER — CEPHALEXIN 500 MG
500 CAPSULE ORAL DAILY
Refills: 0 | Status: DISCONTINUED | OUTPATIENT
Start: 2023-03-18 | End: 2023-03-18

## 2023-03-18 RX ADMIN — ATORVASTATIN CALCIUM 80 MILLIGRAM(S): 80 TABLET, FILM COATED ORAL at 23:09

## 2023-03-18 RX ADMIN — Medication 75 MILLIGRAM(S): at 23:09

## 2023-03-18 RX ADMIN — HEPARIN SODIUM 5000 UNIT(S): 5000 INJECTION INTRAVENOUS; SUBCUTANEOUS at 05:31

## 2023-03-18 RX ADMIN — TAMSULOSIN HYDROCHLORIDE 0.4 MILLIGRAM(S): 0.4 CAPSULE ORAL at 23:08

## 2023-03-18 RX ADMIN — FAMOTIDINE 10 MILLIGRAM(S): 10 INJECTION INTRAVENOUS at 12:48

## 2023-03-18 RX ADMIN — SEVELAMER CARBONATE 800 MILLIGRAM(S): 2400 POWDER, FOR SUSPENSION ORAL at 12:48

## 2023-03-18 RX ADMIN — SEVELAMER CARBONATE 800 MILLIGRAM(S): 2400 POWDER, FOR SUSPENSION ORAL at 17:53

## 2023-03-18 RX ADMIN — Medication 1 MILLIGRAM(S): at 11:27

## 2023-03-18 RX ADMIN — CARVEDILOL PHOSPHATE 12.5 MILLIGRAM(S): 80 CAPSULE, EXTENDED RELEASE ORAL at 09:01

## 2023-03-18 RX ADMIN — SENNA PLUS 2 TABLET(S): 8.6 TABLET ORAL at 23:09

## 2023-03-18 RX ADMIN — Medication 3: at 07:54

## 2023-03-18 RX ADMIN — VALSARTAN 40 MILLIGRAM(S): 80 TABLET ORAL at 05:31

## 2023-03-18 RX ADMIN — CLOPIDOGREL BISULFATE 75 MILLIGRAM(S): 75 TABLET, FILM COATED ORAL at 11:27

## 2023-03-18 RX ADMIN — CARVEDILOL PHOSPHATE 12.5 MILLIGRAM(S): 80 CAPSULE, EXTENDED RELEASE ORAL at 17:54

## 2023-03-18 RX ADMIN — CARVEDILOL PHOSPHATE 12.5 MILLIGRAM(S): 80 CAPSULE, EXTENDED RELEASE ORAL at 01:36

## 2023-03-18 RX ADMIN — Medication 100 MILLIGRAM(S): at 23:08

## 2023-03-18 RX ADMIN — Medication 81 MILLIGRAM(S): at 11:27

## 2023-03-18 RX ADMIN — HEPARIN SODIUM 5000 UNIT(S): 5000 INJECTION INTRAVENOUS; SUBCUTANEOUS at 17:52

## 2023-03-18 RX ADMIN — Medication 3: at 12:48

## 2023-03-18 RX ADMIN — Medication 650 MILLIGRAM(S): at 11:26

## 2023-03-18 RX ADMIN — Medication 500 MILLIGRAM(S): at 11:27

## 2023-03-18 RX ADMIN — PIPERACILLIN AND TAZOBACTAM 25 GRAM(S): 4; .5 INJECTION, POWDER, LYOPHILIZED, FOR SOLUTION INTRAVENOUS at 05:32

## 2023-03-18 RX ADMIN — Medication 650 MILLIGRAM(S): at 11:56

## 2023-03-18 RX ADMIN — Medication 3 MILLIGRAM(S): at 23:09

## 2023-03-18 RX ADMIN — SEVELAMER CARBONATE 800 MILLIGRAM(S): 2400 POWDER, FOR SUSPENSION ORAL at 09:01

## 2023-03-19 ENCOUNTER — TRANSCRIPTION ENCOUNTER (OUTPATIENT)
Age: 63
End: 2023-03-19

## 2023-03-19 LAB
CULTURE RESULTS: SIGNIFICANT CHANGE UP
CULTURE RESULTS: SIGNIFICANT CHANGE UP
GLUCOSE BLDC GLUCOMTR-MCNC: 221 MG/DL — HIGH (ref 70–99)
GLUCOSE BLDC GLUCOMTR-MCNC: 231 MG/DL — HIGH (ref 70–99)
GLUCOSE BLDC GLUCOMTR-MCNC: 246 MG/DL — HIGH (ref 70–99)
GLUCOSE BLDC GLUCOMTR-MCNC: 254 MG/DL — HIGH (ref 70–99)
SARS-COV-2 RNA SPEC QL NAA+PROBE: SIGNIFICANT CHANGE UP
SPECIMEN SOURCE: SIGNIFICANT CHANGE UP
SPECIMEN SOURCE: SIGNIFICANT CHANGE UP

## 2023-03-19 RX ORDER — LIDOCAINE 4 G/100G
1 CREAM TOPICAL ONCE
Refills: 0 | Status: DISCONTINUED | OUTPATIENT
Start: 2023-03-19 | End: 2023-03-20

## 2023-03-19 RX ADMIN — Medication 1: at 22:05

## 2023-03-19 RX ADMIN — CARVEDILOL PHOSPHATE 12.5 MILLIGRAM(S): 80 CAPSULE, EXTENDED RELEASE ORAL at 17:47

## 2023-03-19 RX ADMIN — Medication 100 MILLIGRAM(S): at 22:06

## 2023-03-19 RX ADMIN — SEVELAMER CARBONATE 800 MILLIGRAM(S): 2400 POWDER, FOR SUSPENSION ORAL at 17:47

## 2023-03-19 RX ADMIN — Medication 500 MILLIGRAM(S): at 13:37

## 2023-03-19 RX ADMIN — VALSARTAN 40 MILLIGRAM(S): 80 TABLET ORAL at 06:58

## 2023-03-19 RX ADMIN — Medication 81 MILLIGRAM(S): at 13:37

## 2023-03-19 RX ADMIN — SEVELAMER CARBONATE 800 MILLIGRAM(S): 2400 POWDER, FOR SUSPENSION ORAL at 13:37

## 2023-03-19 RX ADMIN — CLOPIDOGREL BISULFATE 75 MILLIGRAM(S): 75 TABLET, FILM COATED ORAL at 13:37

## 2023-03-19 RX ADMIN — Medication 2: at 13:38

## 2023-03-19 RX ADMIN — ATORVASTATIN CALCIUM 80 MILLIGRAM(S): 80 TABLET, FILM COATED ORAL at 22:05

## 2023-03-19 RX ADMIN — TAMSULOSIN HYDROCHLORIDE 0.4 MILLIGRAM(S): 0.4 CAPSULE ORAL at 22:05

## 2023-03-19 RX ADMIN — CARVEDILOL PHOSPHATE 12.5 MILLIGRAM(S): 80 CAPSULE, EXTENDED RELEASE ORAL at 06:58

## 2023-03-19 RX ADMIN — FAMOTIDINE 10 MILLIGRAM(S): 10 INJECTION INTRAVENOUS at 13:38

## 2023-03-19 RX ADMIN — HEPARIN SODIUM 5000 UNIT(S): 5000 INJECTION INTRAVENOUS; SUBCUTANEOUS at 17:46

## 2023-03-19 RX ADMIN — Medication 2: at 08:42

## 2023-03-19 RX ADMIN — HEPARIN SODIUM 5000 UNIT(S): 5000 INJECTION INTRAVENOUS; SUBCUTANEOUS at 06:58

## 2023-03-19 RX ADMIN — Medication 650 MILLIGRAM(S): at 11:46

## 2023-03-19 RX ADMIN — Medication 1 MILLIGRAM(S): at 13:38

## 2023-03-19 RX ADMIN — Medication 2: at 17:47

## 2023-03-19 RX ADMIN — SEVELAMER CARBONATE 800 MILLIGRAM(S): 2400 POWDER, FOR SUSPENSION ORAL at 08:41

## 2023-03-19 RX ADMIN — Medication 75 MILLIGRAM(S): at 22:06

## 2023-03-19 RX ADMIN — Medication 3 MILLIGRAM(S): at 22:05

## 2023-03-19 RX ADMIN — SENNA PLUS 2 TABLET(S): 8.6 TABLET ORAL at 22:06

## 2023-03-19 NOTE — DISCHARGE NOTE PROVIDER - NSDCFUSCHEDAPPT_GEN_ALL_CORE_FT
Ruma Araujo  [facility]  WOUNDCARE [address]  Scheduled Appointment: 03/22/2023    Sari Asher  [facility]  ENDOCRIN [address]  Scheduled Appointment: 03/29/2023    Bannazadeh, Mohsen  [facility]  VASCULAR [address]  Scheduled Appointment: 04/06/2023    [facility]  VASCULAR [address]  Scheduled Appointment: 04/06/2023    Steve Hernandez  [facility]  ENDOCRIN [address]  Scheduled Appointment: 05/16/2023

## 2023-03-19 NOTE — DISCHARGE NOTE PROVIDER - HOSPITAL COURSE
62M w/ PMHx of ESRD on HD MWF, CAD s/p stents, CHF, CM, DM who presents to the ED for fever that began this morning.  Per wife, pt. has been experiencing an unsteady gait since this AM.  Yesterday's HD session ended a little bit early than normal given pt. was feeling unwell, and was found hypotensive.  Fever today was 101.4 at home and wife gave Tylenol at the time.  Pt. w/ left heel wound that has been taking care off and dressed daily by wife.  Endorsing mild drainage for the past wk, and was seen by podiatry and found w/ positive heel culture.  pt was seen in my office with L heel eschar sent to wound care  Dx               Fever                       Left Heel eschar s/p Vanco/Zosyn; c/w Zosyn                     + Rhino virus   62M w/ PMHx of ESRD on HD MWF, CAD s/p stents, CHF, CM, DM who presents to the ED for fever that began this morning.  Per wife, pt. has been experiencing an unsteady gait since this AM.  Yesterday's HD session ended a little bit early than normal given pt. was feeling unwell, and was found hypotensive.  Fever today was 101.4 at home and wife gave Tylenol at the time.  Pt. w/ left heel wound that has been taking care off and dressed daily by wife.  Endorsing mild drainage for the past wk, and was seen by podiatry and found w/ positive heel culture.  Pt was seen by podaitry in the ED and cleared for d/c from their standpoint on PO abx. Pt was ultimately admitted for weakness and fevers - found to have entero/rhinovirus on admission. Pt was started on emperic IV abx for concern for infected DFU, but ID was called and transitioned the pt to PO keflex with end date 3/21. Pt was seen by PT who are ultimately recommending ELLA.           Dx               Fever  / +enter/rhinovirus                     Left Heel eschar - PO Keflex til 3/21

## 2023-03-19 NOTE — DISCHARGE NOTE PROVIDER - PROVIDER TOKENS
PROVIDER:[TOKEN:[[id number]],FOLLOWUP:[1 month],ESTABLISHEDPATIENT:[T]] PROVIDER:[TOKEN:[[id number]],FOLLOWUP:[1 month],ESTABLISHEDPATIENT:[T]],PROVIDER:[TOKEN:[[id number]],FOLLOWUP:[1 week]]

## 2023-03-19 NOTE — DISCHARGE NOTE PROVIDER - NSDCCPCAREPLAN_GEN_ALL_CORE_FT
PRINCIPAL DISCHARGE DIAGNOSIS  Diagnosis: Fever  Assessment and Plan of Treatment: Your fever was from your viral enter/rhinovirus infection. You were treated with supportive care.   You were seen by physical therapy who are recommending you go to a subacute rehab facility upon discharge.  You should follow up with your PCP upon discharge.      SECONDARY DISCHARGE DIAGNOSES  Diagnosis: Non healing left heel wound  Assessment and Plan of Treatment: You have a diabetic foot wound of your left heel.   You were seen and cleared by podiatry for discharge on oral antibiotics.   You should follow up with your podiatrist upon discharge.

## 2023-03-19 NOTE — DISCHARGE NOTE PROVIDER - NSDCQMSTROKE_NEU_ALL_CORE
After Visit Summary   10/10/2017    Analisa Spivey    MRN: [id number]           Patient Information     Date Of Birth          2000        Visit Information        Provider Department      10/10/2017 5:00 PM Kassandra Toribio LMFT [facility]        Today's Diagnoses     Gender dysphoria in adolescent and adult    -  1    Anxiety        Major depressive disorder, single episode, moderate (H)           Follow-ups after your visit        Your next 10 appointments already scheduled     Oct 31, 2017  6:00 PM CDT   INDIVIDUAL THERAPY with MILY Reddy   [facility]    [address]  [address]  [facility] [address]   [phone]              Who to contact     Please call your clinic at [phone] to:    Ask questions about your health    Make or cancel appointments    Discuss your medicines    Learn about your test results    Speak to your doctor   If you have compliments or concerns about an experience at your clinic, or if you wish to file a complaint, please contact [facility] Patient Relations at [phone] or email us at [email or web address]         Additional Information About Your Visit        MyChart Information     Yapmohart is an electronic gateway that provides easy, online access to your medical records. With CBIT A[employer]St, you can request a clinic appointment, read your test results, renew a prescription or communicate with your care team.     To sign up for LapSpace, please contact your [facility] Clinic or call [phone] for assistance.           Care EveryWhere ID     This is your Care EveryWhere ID. This could be used by other organizations to access your [place] medical records  Opted out of Care Everywhere exchange         Blood Pressure from Last 3 Encounters:   No data found for BP    Weight from Last 3 Encounters:   No data found for Wt               We Performed the Following     Individual Psychotherapy (53+ min) [64255]        Primary Care Provider    None Specified       No primary provider on file.        Equal Access to Services     ALEXEY LEE : Jeimy Cardona, urvashi tineo, glennypuma lanemajo augustin, waxjeet mattin hayaalouie hawkinstila javierandrewsdiana bowmanSharonjosé saul. So [facility] [phone].    ATENCIÓN: Si habla español, tiene a hodgson disposición servicios gratuitos de asistencia lingüística. Llame al [phone].    We comply with applicable federal civil rights laws and [place] laws. We do not discriminate on the basis of race, color, national origin, age, disability, sex, sexual orientation, or gender identity.            Thank you!     Thank you for choosing [facility]  for your care. Our goal is always to provide you with excellent care. Hearing back from our patients is one way we can continue to improve our services. Please take a few minutes to complete the written survey that you may receive in the mail after your visit with us. Thank you!             Your Updated Medication List - Protect others around you: Learn how to safely use, store and throw away your medicines at www.disposemymeds.org.      Notice  As of 10/10/2017 11:59 PM    You have not been prescribed any medications.       No

## 2023-03-19 NOTE — DISCHARGE NOTE PROVIDER - CARE PROVIDER_API CALL
Lesly Leonard  CARDIOVASCULAR DISEASE  [address], [address]  [place] [address]  Phone: [phone]  Fax: [phone]  Established Patient  Follow Up Time: 1 month   Lesly Leonard  CARDIOVASCULAR DISEASE  [address], [address]  [place] [address]  Phone: [phone]  Fax: [phone]  Established Patient  Follow Up Time: 1 month    Rosaline Israel ()  Family Medicine  [address]  [place] [address]  Phone: [phone]  Fax: [phone]  Follow Up Time: 1 week

## 2023-03-19 NOTE — DISCHARGE NOTE PROVIDER - NSDCMRMEDTOKEN_GEN_ALL_CORE_FT
acetaminophen 325 mg oral tablet: 1-2 tab(s) orally every 6 hours, As Needed  allopurinol 100 mg oral tablet: 1 tab(s) orally once a day (at bedtime)  ascorbic acid 500 mg oral tablet: 1 tab(s) orally once a day  aspirin 81 mg oral tablet, chewable: 1 tab(s) orally once a day  atorvastatin 80 mg oral tablet: 1 tab(s) orally once a day (at bedtime)  clopidogrel 75 mg oral tablet: 1 tab(s) orally once a day  famotidine 10 mg oral tablet: 1 tab(s) orally once a day  folic acid 1 mg oral tablet: 1 tab(s) orally once a day  lidocaine 4% topical film: Apply 1 patch topically to affected area 3 times a week before dialysis (MWF); remove after 12 hours  melatonin 3 mg oral tablet: 1 tab(s) orally once a day (at bedtime)  metoprolol succinate 25 mg oral tablet, extended release: 1 tab(s) orally once a day  pregabalin 75 mg oral capsule: 1 cap(s) orally once a day (at bedtime)  Santyl 250 units/g topical ointment: Apply topically to affected area (left heel) once a day  senna leaf extract oral tablet: 2 tab(s) orally once a day (at bedtime)  sevelamer carbonate 800 mg oral tablet: 1 tab(s) orally 3 times a day (with meals)  tamsulosin 0.4 mg oral capsule: 1 cap(s) orally once a day (at bedtime)  Toujeo SoloStar 300 units/mL subcutaneous solution: 15 unit(s) subcutaneous once a day (at bedtime) as per blood sugar level

## 2023-03-20 ENCOUNTER — TRANSCRIPTION ENCOUNTER (OUTPATIENT)
Age: 63
End: 2023-03-20

## 2023-03-20 VITALS
TEMPERATURE: 98 F | SYSTOLIC BLOOD PRESSURE: 115 MMHG | HEART RATE: 84 BPM | RESPIRATION RATE: 18 BRPM | OXYGEN SATURATION: 98 % | DIASTOLIC BLOOD PRESSURE: 67 MMHG

## 2023-03-20 LAB
GLUCOSE BLDC GLUCOMTR-MCNC: 186 MG/DL — HIGH (ref 70–99)
GLUCOSE BLDC GLUCOMTR-MCNC: 220 MG/DL — HIGH (ref 70–99)
GLUCOSE BLDC GLUCOMTR-MCNC: 321 MG/DL — HIGH (ref 70–99)

## 2023-03-20 PROCEDURE — 86803 HEPATITIS C AB TEST: CPT

## 2023-03-20 PROCEDURE — 99261: CPT

## 2023-03-20 PROCEDURE — 71045 X-RAY EXAM CHEST 1 VIEW: CPT

## 2023-03-20 PROCEDURE — 85014 HEMATOCRIT: CPT

## 2023-03-20 PROCEDURE — 80053 COMPREHEN METABOLIC PANEL: CPT

## 2023-03-20 PROCEDURE — 73630 X-RAY EXAM OF FOOT: CPT

## 2023-03-20 PROCEDURE — 85027 COMPLETE CBC AUTOMATED: CPT

## 2023-03-20 PROCEDURE — 82947 ASSAY GLUCOSE BLOOD QUANT: CPT

## 2023-03-20 PROCEDURE — 85610 PROTHROMBIN TIME: CPT

## 2023-03-20 PROCEDURE — 82565 ASSAY OF CREATININE: CPT

## 2023-03-20 PROCEDURE — 85018 HEMOGLOBIN: CPT

## 2023-03-20 PROCEDURE — 84295 ASSAY OF SERUM SODIUM: CPT

## 2023-03-20 PROCEDURE — U0005: CPT

## 2023-03-20 PROCEDURE — 82803 BLOOD GASES ANY COMBINATION: CPT

## 2023-03-20 PROCEDURE — 83735 ASSAY OF MAGNESIUM: CPT

## 2023-03-20 PROCEDURE — 83605 ASSAY OF LACTIC ACID: CPT

## 2023-03-20 PROCEDURE — 81001 URINALYSIS AUTO W/SCOPE: CPT

## 2023-03-20 PROCEDURE — 82435 ASSAY OF BLOOD CHLORIDE: CPT

## 2023-03-20 PROCEDURE — 0225U NFCT DS DNA&RNA 21 SARSCOV2: CPT

## 2023-03-20 PROCEDURE — 82962 GLUCOSE BLOOD TEST: CPT

## 2023-03-20 PROCEDURE — 73610 X-RAY EXAM OF ANKLE: CPT

## 2023-03-20 PROCEDURE — 96365 THER/PROPH/DIAG IV INF INIT: CPT

## 2023-03-20 PROCEDURE — 87340 HEPATITIS B SURFACE AG IA: CPT

## 2023-03-20 PROCEDURE — 85025 COMPLETE CBC W/AUTO DIFF WBC: CPT

## 2023-03-20 PROCEDURE — 36415 COLL VENOUS BLD VENIPUNCTURE: CPT

## 2023-03-20 PROCEDURE — 83036 HEMOGLOBIN GLYCOSYLATED A1C: CPT

## 2023-03-20 PROCEDURE — 97530 THERAPEUTIC ACTIVITIES: CPT

## 2023-03-20 PROCEDURE — 99285 EMERGENCY DEPT VISIT HI MDM: CPT | Mod: 25

## 2023-03-20 PROCEDURE — 84132 ASSAY OF SERUM POTASSIUM: CPT

## 2023-03-20 PROCEDURE — 86140 C-REACTIVE PROTEIN: CPT

## 2023-03-20 PROCEDURE — 86706 HEP B SURFACE ANTIBODY: CPT

## 2023-03-20 PROCEDURE — 84100 ASSAY OF PHOSPHORUS: CPT

## 2023-03-20 PROCEDURE — 87040 BLOOD CULTURE FOR BACTERIA: CPT

## 2023-03-20 PROCEDURE — 80048 BASIC METABOLIC PNL TOTAL CA: CPT

## 2023-03-20 PROCEDURE — 85730 THROMBOPLASTIN TIME PARTIAL: CPT

## 2023-03-20 PROCEDURE — 96366 THER/PROPH/DIAG IV INF ADDON: CPT

## 2023-03-20 PROCEDURE — 82330 ASSAY OF CALCIUM: CPT

## 2023-03-20 PROCEDURE — 87086 URINE CULTURE/COLONY COUNT: CPT

## 2023-03-20 PROCEDURE — 97161 PT EVAL LOW COMPLEX 20 MIN: CPT

## 2023-03-20 PROCEDURE — 84145 PROCALCITONIN (PCT): CPT

## 2023-03-20 PROCEDURE — 96367 TX/PROPH/DG ADDL SEQ IV INF: CPT

## 2023-03-20 PROCEDURE — 97116 GAIT TRAINING THERAPY: CPT

## 2023-03-20 PROCEDURE — U0003: CPT

## 2023-03-20 PROCEDURE — 85652 RBC SED RATE AUTOMATED: CPT

## 2023-03-20 RX ORDER — METOPROLOL TARTRATE 50 MG
1 TABLET ORAL
Qty: 0 | Refills: 0 | DISCHARGE

## 2023-03-20 RX ORDER — VALSARTAN 80 MG/1
1 TABLET ORAL
Qty: 30 | Refills: 0
Start: 2023-03-20 | End: 2023-04-18

## 2023-03-20 RX ORDER — CEPHALEXIN 500 MG
1 CAPSULE ORAL
Qty: 1 | Refills: 0
Start: 2023-03-20 | End: 2023-03-20

## 2023-03-20 RX ORDER — CARVEDILOL PHOSPHATE 80 MG/1
1 CAPSULE, EXTENDED RELEASE ORAL
Qty: 60 | Refills: 0
Start: 2023-03-20 | End: 2023-04-18

## 2023-03-20 RX ADMIN — Medication 4: at 12:37

## 2023-03-20 RX ADMIN — Medication 2: at 09:07

## 2023-03-20 RX ADMIN — HEPARIN SODIUM 5000 UNIT(S): 5000 INJECTION INTRAVENOUS; SUBCUTANEOUS at 17:19

## 2023-03-20 RX ADMIN — SEVELAMER CARBONATE 800 MILLIGRAM(S): 2400 POWDER, FOR SUSPENSION ORAL at 12:37

## 2023-03-20 RX ADMIN — Medication 500 MILLIGRAM(S): at 11:07

## 2023-03-20 RX ADMIN — SEVELAMER CARBONATE 800 MILLIGRAM(S): 2400 POWDER, FOR SUSPENSION ORAL at 09:07

## 2023-03-20 RX ADMIN — Medication 1 MILLIGRAM(S): at 11:07

## 2023-03-20 RX ADMIN — VALSARTAN 40 MILLIGRAM(S): 80 TABLET ORAL at 06:52

## 2023-03-20 RX ADMIN — CARVEDILOL PHOSPHATE 12.5 MILLIGRAM(S): 80 CAPSULE, EXTENDED RELEASE ORAL at 17:20

## 2023-03-20 RX ADMIN — Medication 1: at 17:21

## 2023-03-20 RX ADMIN — Medication 500 MILLIGRAM(S): at 17:19

## 2023-03-20 RX ADMIN — HEPARIN SODIUM 5000 UNIT(S): 5000 INJECTION INTRAVENOUS; SUBCUTANEOUS at 06:51

## 2023-03-20 RX ADMIN — CLOPIDOGREL BISULFATE 75 MILLIGRAM(S): 75 TABLET, FILM COATED ORAL at 11:07

## 2023-03-20 RX ADMIN — SEVELAMER CARBONATE 800 MILLIGRAM(S): 2400 POWDER, FOR SUSPENSION ORAL at 17:19

## 2023-03-20 RX ADMIN — LIDOCAINE 1 APPLICATION(S): 4 CREAM TOPICAL at 12:50

## 2023-03-20 RX ADMIN — FAMOTIDINE 10 MILLIGRAM(S): 10 INJECTION INTRAVENOUS at 11:07

## 2023-03-20 RX ADMIN — Medication 81 MILLIGRAM(S): at 11:07

## 2023-03-20 RX ADMIN — CARVEDILOL PHOSPHATE 12.5 MILLIGRAM(S): 80 CAPSULE, EXTENDED RELEASE ORAL at 06:52

## 2023-03-20 NOTE — DISCHARGE NOTE NURSING/CASE MANAGEMENT/SOCIAL WORK - PATIENT PORTAL LINK FT
You can access the FollowMyHealth Patient Portal offered by [facility] by registering at the following website: [email or web address]. By joining [facility]’s FollowMyHealth portal, you will also be able to view your health information using other applications (apps) compatible with our system.

## 2023-03-20 NOTE — DISCHARGE NOTE NURSING/CASE MANAGEMENT/SOCIAL WORK - NSDCVIVACCINE_GEN_ALL_CORE_FT
COVID-19, mRNA, LNP-S, PF, 30 mcg/0.3 mL dose (Pfizer); 09-Dec-2021 11:24; Sobeida Kang (RN); [employer]; [id number] (Exp. Date: 31-Dec-2021); IntraMuscular; Deltoid Left.; 0.3 milliLiter(s);   influenza, injectable, quadrivalent, preservative free; 09-Dec-2021 11:25; Sobeida Kang (SELVIN); [employer]; [id number] (Exp. Date: 30-Jun-2022); IntraMuscular; Deltoid Right.; 0.5 milliLiter(s); VIS (VIS Published: 06-Aug-2021, VIS Presented: 09-Dec-2021);

## 2023-03-20 NOTE — PROGRESS NOTE ADULT - ASSESSMENT
62   yr      hx CAD s/p 2 drug eluding stents 11/2022 , was on asa/plavix,       CHF, DM, gout, ESRD on dialysis      c/c  weakness  of   limbs/  neuro ga mccain     has  functional  quadriplegia/  CT  head, . old  arachnoid  cyst/  no intervention       h/o   gout, right  wrist, on  prednisone/ colchicine      prior  MRI  head/  C  spine., old  infarcts     s/p falls , in the past ,  CT head  12/2022, R lateral convexity 1.3 cm SDH extending into interhemispheric fissure.      pt  with  h/o intermittent  right  arm /leg weakness  has been   c/c  for  yrs/  with  unclear  etiology     mri head/  c spine in 10/22,  no cord  compression     seen by neuro in  past,   per  wife,  thinks  pt  has  a psychological  component  regarding  his  weakness          *  now admitted  with fevers/  low  sbp/  which ha s improved         CT  head,  SDH, smaller  in size    *     + Enterorhonovirus          bcx/ ucx,  negative     *     CAD,    s/p    pci. on  asa/ plavix/   lipitor,           h/o cardiomyopathy.  ef  40     *    CKD,     renal  dr dickerson    *   c/c  anemia     *    DM,  follow  fs              on lantus/  known to  nika talley    *     gout    cx  are negative/  per  ID  charge  to po  ab/  start   d/c planning                     
    62   yr      hx CAD s/p 2 drug eluding stents 11/2022 , was on asa/plavix,       CHF, DM, gout, ESRD on dialysis      c/c  weakness  of   limbs/  neuro ga mccain     has  functional  quadriplegia/  CT  head, . old  arachnoid  cyst/  no intervention       h/o   gout, right  wrist, on  prednisone/ colchicine      prior  MRI  head/  C  spine., old  infarcts     s/p falls , in the past ,  CT head  12/2022, R lateral convexity 1.3 cm SDH extending into interhemispheric fissure.      pt  with  h/o intermittent  right  arm /leg weakness  has been   c/c  for  yrs/  with  unclear  etiology     mri head/  c spine in 10/22,  no cord  compression     seen by neuro in  past,   per  wife,  thinks  pt  has  a psychological  component  regarding  his  weakness          *  now admitted  with fevers/  low  sbp/  which ha s improved         CT  head,  SDH, smaller  in size    *     + Enterorhonovirus          bcx/ ucx,  negative     *     CAD,    s/p    pci. on  asa/ plavix/   lipitor,           h/o cardiomyopathy.  ef  40     *    CKD,     renal  dr dickerson    *   c/c  anemia     *    DM,  follow  fs              on lantus/  known to  nika talley    *     gout    cx  are negative/ perhaps  d/c iv zosyn    plan,  d/c  home today  afetr,   hD                     
  62-year-old male patient past medical history of end-stage renal disease on dialysis MWF, CAD status post stents, CHF, cardiomyopathy, diabetes pw possible osteomyelitis     1 Renal -  s/p HD 3/17/23 with 2.2 L net fluid loss     - subsequent HD today and then will trend the BP     2 Hyponatremia- Na+ improving s/p HD    3 Hyperphosphatemia- phos improving s/p HD and on Renvela    4 Anemia- hgb down -->  Retacrit at HD     5 Podiatry - Keflex 500mg po starting 3/19 for 3 days post dialysis per ID    Care coordination working on dc planning       Sayed [facility]   [phone]     
62-year-old male patient past medical history of end-stage renal disease on dialysis MWF, CAD status post stents, CHF, cardiomyopathy, diabetes pw possible osteomyelitis     1 Renal - HD today   Cont Renvela   2 Anemia-Retacrit at HD   3 Podiatry - IV abx and ID eval pending     Sayed [facility]   [phone]     
    62   yr      hx CAD s/p 2 drug eluding stents 11/2022 , was on asa/plavix,       CHF, DM, gout, ESRD on dialysis      c/c  weakness  of   limbs/  neuro ga mccain     has  functional  quadriplegia/  CT  head, . old  arachnoid  cyst/  no intervention       h/o   gout, right  wrist, on  prednisone/ colchicine      prior  MRI  head/  C  spine., old  infarcts     s/p falls , in the past ,  CT head  12/2022, R lateral convexity 1.3 cm SDH extending into interhemispheric fissure.      pt  with  h/o intermittent  right  arm /leg weakness  has been   c/c  for  yrs/  with  unclear  etiology     mri head/  c spine in 10/22,  no cord  compression     seen by neuro in  past,   per  wife,  thinks  pt  has  a psychological  component  regarding  his  weakness          *  now admitted  with fevers/  low  sbp/  which ha s improved         CT  head,  SDH, smaller  in size    *     + Enterorhonovirus          bcx/ ucx,  negative     *     CAD,    s/p    pci. on  asa/ plavix/   lipitor,           h/o cardiomyopathy.  ef  40     *    CKD,     renal  dr dickerson    *   c/c  anemia     *    DM,  follow  fs              on lantus/  known to  nika talley    *     gout    cx  are negative/ perhaps  d/c iv zosyn    plan,  d/c  on monday  with  hD                     
62 year old male visited at bedside. patient is poor historian. patient relates that he goes to an unknown outside wound center  LOWER EXTREMITY PHYSICAL EXAM:    Vascular: DP/PT _14, B/L, CFT <3 seconds B/L, Temperature gradient wnl, B/L.   Neuro: Epicritic sensation absent to the level of toes, B/L.  Musculoskeletal/Ortho: Skin: necrotic ulcer posterior left heel  no signs of cellulitis both feet  Apply and rx santyl collagenase for enzymatic debridement with dsd daily  patient getting discharged today and will follow up with outside wound center  patient given [facility] wound center address if he wants to follow up with [facility]   at [address] [place] [address] [phone]  to see Dr. Green on Fridays or reconsult podiatry as needed
    62   yr      hx CAD s/p 2 drug eluding stents 11/2022 , was on asa/plavix,       CHF, DM, gout, ESRD on dialysis      c/c  weakness  of   limbs/  neuro ga mccain     has  functional  quadriplegia/  CT  head, . old  arachnoid  cyst/  no intervention       h/o   gout, right  wrist, on  prednisone/ colchicine      prior  MRI  head/  C  spine., old  infarcts     s/p falls , in the past ,  CT head  12/2022, R lateral convexity 1.3 cm SDH extending into interhemispheric fissure.      pt  with  h/o intermittent  right  arm /leg weakness  has been   c/c  for  yrs/  with  unclear  etiology     mri head/  c spine in 10/22,  no cord  compression     seen by neuro in  past,   per  wife,  thinks  pt  has  a psychological  component  regarding  his  weakness          *  now admitted  with fevers/  low  sbp/  which ha s improved         CT  head,  SDH, smaller  in size    *     + Enterorhonovirus          bcx/ ucx,  negative     *     CAD,    s/p    pci. on  asa/ plavix/   lipitor,           h/o cardiomyopathy.  ef  40     *    CKD,     renal  dr dickerson    *   c/c  anemia     *    DM,  follow  fs              on lantus/  known to  nika talley    *     gout    cx  are negative/ perhaps  d/c iv zosyn                     
62-year-old male patient past medical history of end-stage renal disease on dialysis MWF, CAD status post stents, CHF, cardiomyopathy, diabetes pw possible osteomyelitis     1 Renal - HD in am if stays    Cont Renvela   2 Anemia-Retacrit at HD   3 Podiatry - Is there a need for MRI of the heel?  IV abx;  Podiatry eval noted     Sayed [facility]   [phone]     
s/p HD 2.2L net fluid loss yesteday  on room air  no complaints  Next HD Monday    acetaminophen     Tablet .. 650 milliGRAM(s) Oral every 6 hours PRN  allopurinol 100 milliGRAM(s) Oral at bedtime  ascorbic acid 500 milliGRAM(s) Oral daily  aspirin enteric coated 81 milliGRAM(s) Oral daily  atorvastatin 80 milliGRAM(s) Oral at bedtime  carvedilol 12.5 milliGRAM(s) Oral every 12 hours  chlorhexidine 4% Liquid 1 Application(s) Topical once  clopidogrel Tablet 75 milliGRAM(s) Oral daily  dextrose 5%. 1000 milliLiter(s) IV Continuous <Continuous>  dextrose 5%. 1000 milliLiter(s) IV Continuous <Continuous>  dextrose 50% Injectable 25 Gram(s) IV Push once  dextrose 50% Injectable 12.5 Gram(s) IV Push once  dextrose 50% Injectable 25 Gram(s) IV Push once  dextrose Oral Gel 15 Gram(s) Oral once PRN  famotidine    Tablet 10 milliGRAM(s) Oral daily  folic acid 1 milliGRAM(s) Oral daily  glucagon  Injectable 1 milliGRAM(s) IntraMuscular once  heparin   Injectable 5000 Unit(s) SubCutaneous every 12 hours  insulin lispro (ADMELOG) corrective regimen sliding scale   SubCutaneous three times a day before meals  insulin lispro (ADMELOG) corrective regimen sliding scale   SubCutaneous at bedtime  lidocaine 2% Gel 1 Application(s) Topical <User Schedule>  melatonin 3 milliGRAM(s) Oral at bedtime  pregabalin 75 milliGRAM(s) Oral at bedtime  senna 2 Tablet(s) Oral at bedtime  sevelamer carbonate 800 milliGRAM(s) Oral three times a day with meals  tamsulosin 0.4 milliGRAM(s) Oral at bedtime  valsartan 40 milliGRAM(s) Oral daily      VITAL:  T(C): , Max: 37.2 (03-18-23 @ 05:15)  T(F): , Max: 98.9 (03-18-23 @ 05:15)  HR: 82 (03-18-23 @ 17:58)  BP: 168/84 (03-18-23 @ 17:58)  BP(mean): --  RR: 18 (03-18-23 @ 17:58)  SpO2: 98% (03-18-23 @ 17:58)  Wt(kg): --    03-17-23 @ 07:01  -  03-18-23 @ 07:00  --------------------------------------------------------  IN: 0 mL / OUT: 2200 mL / NET: -2200 mL        PHYSICAL EXAM:  Constitutional: NAD  Neck:  No JVD  Respiratory: CTAB/L  Cardiovascular: S1 and S2  Gastrointestinal: BS+, soft, NT/ND  Extremities: No peripheral edema  Neurological: A/O x 3, no focal deficits  Psychiatric: Normal mood, normal affect  : No Jay  Skin: No rashes  Access: avf     LABS:                          9.0    9.83  )-----------( 208      ( 17 Mar 2023 22:23 )             27.3     Na(133)/K(4.2)/Cl(93)/HCO3(24)/BUN(35)/Cr(5.35)Glu(211)/Ca(8.6)/Mg(--)/PO4(4.0)    03-18 @ 07:16  Na(129)/K(4.4)/Cl(88)/HCO3(24)/BUN(61)/Cr(7.94)Glu(232)/Ca(7.9)/Mg(2.2)/PO4(5.7)    03-17 @ 22:23            ASSESSMENT/PLAN  62-year-old male patient past medical history of end-stage renal disease on dialysis MWF, CAD status post stents, CHF, cardiomyopathy, diabetes pw possible osteomyelitis     1 Renal -  s/p HD 3/17/23 with 2.2 L net fluid loss     - subsequent HD Monday   2 Hyponatremia- Na+ improving s/p HD    3 Hyperphosphatemia- phos improving s/p HD and on Renvela    4 Anemia- hgb down slightly s/p Retacrit at HD   5 Podiatry - Keflex 500mg po starting 3/19 for 3 days post dialysis per ID      Valentino Meza, NP-BC  [facility]  [phone]

## 2023-03-20 NOTE — DISCHARGE NOTE NURSING/CASE MANAGEMENT/SOCIAL WORK - NSDCPEFALRISK_GEN_ALL_CORE
For information on Fall & Injury Prevention, visit: [email or web address] OR  [email or web address] OR  https://www.cdc.gov/steadi/patient.html

## 2023-03-20 NOTE — PROGRESS NOTE ADULT - SUBJECTIVE AND OBJECTIVE BOX
CARDIOLOGY     PROGRESS  NOTE   ________________________________________________    CHIEF COMPLAINT:Patient is a 62y old  Male who presents with a chief complaint of pvd/ sepsis (19 Mar 2023 10:53)  no complain  	  REVIEW OF SYSTEMS:  CONSTITUTIONAL: No fever, weight loss, or fatigue  EYES: No eye pain, visual disturbances, or discharge  ENT:  No difficulty hearing, tinnitus, vertigo; No sinus or throat pain  NECK: No pain or stiffness  RESPIRATORY: No cough, wheezing, chills or hemoptysis; No Shortness of Breath  CARDIOVASCULAR: No chest pain, palpitations, passing out, dizziness, or leg swelling  GASTROINTESTINAL: No abdominal or epigastric pain. No nausea, vomiting, or hematemesis; No diarrhea or constipation. No melena or hematochezia.  GENITOURINARY: No dysuria, frequency, hematuria, or incontinence  NEUROLOGICAL: No headaches, memory loss, loss of strength, numbness, or tremors  SKIN: No itching, burning, rashes, or lesions   LYMPH Nodes: No enlarged glands  ENDOCRINE: No heat or cold intolerance; No hair loss  MUSCULOSKELETAL: No joint pain or swelling; No muscle, back, or extremity pain  PSYCHIATRIC: No depression, anxiety, mood swings, or difficulty sleeping  HEME/LYMPH: No easy bruising, or bleeding gums  ALLERGY AND IMMUNOLOGIC: No hives or eczema	    [ x] All others negative	  [ ] Unable to obtain    PHYSICAL EXAM:  T(C): 37.2 (03-20-23 @ 05:19), Max: 37.2 (03-20-23 @ 05:19)  HR: 85 (03-20-23 @ 05:19) (76 - 85)  BP: 160/79 (03-20-23 @ 05:19) (155/71 - 167/79)  RR: 18 (03-20-23 @ 05:19) (18 - 18)  SpO2: 96% (03-20-23 @ 05:19) (96% - 99%)  Wt(kg): --  I&O's Summary      Appearance: Normal	  HEENT:   Normal oral mucosa, PERRL, EOMI	  Lymphatic: No lymphadenopathy  Cardiovascular: Normal S1 S2, No JVD, + murmurs, No edema  Respiratory: rhonchi  Gastrointestinal:  Soft, Non-tender, + BS	  Skin: No rashes, No ecchymoses, No cyanosis	  Neurologic: Non-focal  Extremities: Normal range of motion, L heel eschar  Vascular:+ pvd    MEDICATIONS  (STANDING):  allopurinol 100 milliGRAM(s) Oral at bedtime  ascorbic acid 500 milliGRAM(s) Oral daily  aspirin enteric coated 81 milliGRAM(s) Oral daily  atorvastatin 80 milliGRAM(s) Oral at bedtime  carvedilol 12.5 milliGRAM(s) Oral every 12 hours  cephalexin 500 milliGRAM(s) Oral daily  chlorhexidine 4% Liquid 1 Application(s) Topical once  clopidogrel Tablet 75 milliGRAM(s) Oral daily  dextrose 5%. 1000 milliLiter(s) (50 mL/Hr) IV Continuous <Continuous>  dextrose 5%. 1000 milliLiter(s) (100 mL/Hr) IV Continuous <Continuous>  dextrose 50% Injectable 25 Gram(s) IV Push once  dextrose 50% Injectable 12.5 Gram(s) IV Push once  dextrose 50% Injectable 25 Gram(s) IV Push once  famotidine    Tablet 10 milliGRAM(s) Oral daily  folic acid 1 milliGRAM(s) Oral daily  glucagon  Injectable 1 milliGRAM(s) IntraMuscular once  heparin   Injectable 5000 Unit(s) SubCutaneous every 12 hours  insulin lispro (ADMELOG) corrective regimen sliding scale   SubCutaneous three times a day before meals  insulin lispro (ADMELOG) corrective regimen sliding scale   SubCutaneous at bedtime  lidocaine 2% Gel 1 Application(s) Topical <User Schedule>  lidocaine 4% Cream 1 Application(s) Topical once  melatonin 3 milliGRAM(s) Oral at bedtime  pregabalin 75 milliGRAM(s) Oral at bedtime  senna 2 Tablet(s) Oral at bedtime  sevelamer carbonate 800 milliGRAM(s) Oral three times a day with meals  tamsulosin 0.4 milliGRAM(s) Oral at bedtime  valsartan 40 milliGRAM(s) Oral daily      TELEMETRY: 	    ECG:  	  RADIOLOGY:  OTHER: 	  	  LABS:	 	    CARDIAC MARKERS:                  proBNP:   Lipid Profile:   HgA1c:   TSH:         Assessment and plan  ---------------------------  62-year-old male patient past medical history of yet end-stage renal disease on dialysis MWF, CAD status post stents, CHF, cardiomyopathy, diabetes who presents to the ED for fever that began this morning.  Per wife, patient has been experiencing an unsteady gait since this AM.  Yesterday's HD session ended a little bit early than normal given patient was feeling unwell, and was found hypotensive.  Fever today was 101.4 at home and wife gave Tylenol at the time.  Patient with left heel wound that has been taking care off and dressed daily by wife.  Endorsing mild drainage for the past week, and was seen by podiatry and found with positive heel culture.  pt was seen in my office with L heel eschar sent to wound care  podiatry appreciated  wound consult in am  no abx as per podiatry  culture has sent  s/p recent stent continue all cardiac meds  esrd on hd  htn, continue  blocker  physical therapy  increase bp will adjust cardiac meds, restart on coreg and valsartan  wound consult/ ID appreciated  physical therapy  dc planning today after HD hopefully first  case  fu dr aguirre in 2 weeks    	        
NEPHROLOGY-[facility] [phone]        Patient seen and examined         MEDICATIONS  (STANDING):  allopurinol 100 milliGRAM(s) Oral at bedtime  ascorbic acid 500 milliGRAM(s) Oral daily  aspirin enteric coated 81 milliGRAM(s) Oral daily  atorvastatin 80 milliGRAM(s) Oral at bedtime  carvedilol 12.5 milliGRAM(s) Oral every 12 hours  chlorhexidine 4% Liquid 1 Application(s) Topical once  clopidogrel Tablet 75 milliGRAM(s) Oral daily  dextrose 5%. 1000 milliLiter(s) (50 mL/Hr) IV Continuous <Continuous>  dextrose 5%. 1000 milliLiter(s) (100 mL/Hr) IV Continuous <Continuous>  dextrose 50% Injectable 25 Gram(s) IV Push once  dextrose 50% Injectable 12.5 Gram(s) IV Push once  dextrose 50% Injectable 25 Gram(s) IV Push once  famotidine    Tablet 10 milliGRAM(s) Oral daily  folic acid 1 milliGRAM(s) Oral daily  glucagon  Injectable 1 milliGRAM(s) IntraMuscular once  heparin   Injectable 5000 Unit(s) SubCutaneous every 12 hours  insulin lispro (ADMELOG) corrective regimen sliding scale   SubCutaneous at bedtime  insulin lispro (ADMELOG) corrective regimen sliding scale   SubCutaneous three times a day before meals  lidocaine 2% Gel 1 Application(s) Topical <User Schedule>  melatonin 3 milliGRAM(s) Oral at bedtime  piperacillin/tazobactam IVPB.. 3.375 Gram(s) IV Intermittent every 12 hours  pregabalin 75 milliGRAM(s) Oral at bedtime  senna 2 Tablet(s) Oral at bedtime  sevelamer carbonate 800 milliGRAM(s) Oral three times a day with meals  tamsulosin 0.4 milliGRAM(s) Oral at bedtime  valsartan 40 milliGRAM(s) Oral daily      VITAL:  T(C): , Max: 37.4 (03-15-23 @ 13:57)  T(F): , Max: 99.3 (03-15-23 @ 13:57)  HR: 72 (23 @ 10:26)  BP: 109/62 (23 @ 10:26)  BP(mean): 86 (03-15-23 @ 21:00)  RR: 18 (23 @ 04:38)  SpO2: 94% (23 @ 10:26)  Wt(kg): --    I and O's:    03-15 @ 07:01  -   @ 07:00  --------------------------------------------------------  IN: 800 mL / OUT: 3000 mL / NET: -2200 mL          PHYSICAL EXAM:    Constitutional: NAD  Neck:  No JVD  Respiratory: CTAB/L  Cardiovascular: S1 and S2  Gastrointestinal: BS+, soft, NT/ND  Extremities: No peripheral edema  Neurological: A/O x 3, no focal deficits  Psychiatric: Normal mood, normal affect  : No Jay  Skin: No rashes  Access: Not applicable    LABS:                        9.1    9.42  )-----------( 162      ( 14 Mar 2023 14:29 )             28.6     03-    137  |  99  |  34<H>  ----------------------------<  281<H>  3.5   |  27  |  3.86<H>    Ca    8.9      14 Mar 2023 14:29    TPro  6.2  /  Alb  3.9  /  TBili  0.3  /  DBili  x   /  AST  7<L>  /  ALT  8<L>  /  AlkPhos  101  03-14          Urine Studies:  Urinalysis Basic - ( 14 Mar 2023 17:20 )    Color: Light Yellow / Appearance: Clear / S.010 / pH: x  Gluc: x / Ketone: Negative  / Bili: Negative / Urobili: Negative   Blood: x / Protein: 300 mg/dL / Nitrite: Negative   Leuk Esterase: Negative / RBC: 4 /hpf / WBC 1 /HPF   Sq Epi: x / Non Sq Epi: 0 /hpf / Bacteria: Negative            RADIOLOGY & ADDITIONAL STUDIES:            
Patient is a 62y old  Male who presents with a chief complaint of pvd/ sepsis (20 Mar 2023 09:23)      HPI:  CHIEF COMPLAINT:Patient is a 62y old  Male who presents with a chief complaint of     HPI:  62-year-old male patient past medical history of yet end-stage renal disease on dialysis MWF, CAD status post stents, CHF, cardiomyopathy, diabetes who presents to the ED for fever that began this morning.  Per wife, patient has been experiencing an unsteady gait since this AM.  Yesterday's HD session ended a little bit early than normal given patient was feeling unwell, and was found hypotensive.  Fever today was 101.4 at home and wife gave Tylenol at the time.  Patient with left heel wound that has been taking care off and dressed daily by wife.  Endorsing mild drainage for the past week, and was seen by podiatry and found with positive heel culture.    PAST MEDICAL & SURGICAL HISTORY:  Diabetes Mellitus Type II, Uncontrolled  Dyslipidemia  s/p Angioplasty with Stent  HTN (hypertension)  Gout  Diabetic retinopathy  GERD (gastroesophageal reflux disease)  Nephrolithiasis  Vitamin D deficiency  Hepatitis  CKD (chronic kidney disease)  Ischemic cardiomyopathy  ASHD (arteriosclerotic heart disease)  Pulmonary hypertension  Myocardial infarction  CAD (coronary artery disease)  BPH (benign prostatic hyperplasia)  Retinal Hemorrhage  S/P coronary artery stent placement  2010 TRICIA to Diag ; 11/18/2010  LAD; 2/4/11 ATOM liberte Diag; 5/15/2017  TRICIA to 1st diag; 6/7/17 PCI with laser and high pressure balloon  History of eye surgery  left eye  H/O lithotripsy  Diabetes with retinopathy  S/P PPV/PRP/GAS  OD 2/22/17 for viterous hemorrhage  CHF with cardiomyopathy  Insertion of Cardiomems monitor  Stented coronary artery    MEDICATIONS  (STANDING):  noted  MEDICATIONS  (PRN):      FAMILY HISTORY:  Family history of cardiac disorder in father (Father)    SOCIAL HISTORY:    [x ] Non-smoker  [ ] Smoker  [ ] Alcohol    Allergies    No Known Drug Allergies  shellfish (Unknown)    Intolerances    	    REVIEW OF SYSTEMS:  CONSTITUTIONAL: No fever, weight loss, or fatigue  EYES: No eye pain, visual disturbances, or discharge  ENT:  No difficulty hearing, tinnitus, vertigo; No sinus or throat pain  NECK: No pain or stiffness  RESPIRATORY: No cough, wheezing, chills or hemoptysis; No Shortness of Breath  CARDIOVASCULAR: No chest pain, palpitations, passing out, dizziness, or leg swelling  GASTROINTESTINAL: No abdominal or epigastric pain. No nausea, vomiting, or hematemesis; No diarrhea or constipation. No melena or hematochezia.  GENITOURINARY: No dysuria, frequency, hematuria, or incontinence  NEUROLOGICAL: No headaches, memory loss, loss of strength, numbness, or tremors  SKIN: No itching, burning, rashes, or lesions   LYMPH Nodes: No enlarged glands  ENDOCRINE: No heat or cold intolerance; No hair loss  MUSCULOSKELETAL: No joint pain or swelling; No muscle, back, +extremity pain  PSYCHIATRIC: No depression, anxiety, mood swings, or difficulty sleeping  HEME/LYMPH: No easy bruising, or bleeding gums  ALLERGY AND IMMUNOLOGIC: No hives or eczema	    [ ] All others negative	  [ ] Unable to obtain    PHYSICAL EXAM:  T(C): 37.2 (03-14-23 @ 22:37), Max: 38.1 (03-14-23 @ 19:25)  HR: 92 (03-14-23 @ 22:37) (81 - 111)  BP: 156/85 (03-14-23 @ 22:37) (113/57 - 184/93)  RR: 16 (03-14-23 @ 22:37) (14 - 20)  SpO2: 95% (03-14-23 @ 22:37) (95% - 98%)  Wt(kg): --  I&O's Summary      Appearance: Normal	  HEENT:   Normal oral mucosa, PERRL, EOMI	  Lymphatic: No lymphadenopathy  Cardiovascular: Normal S1 S2, No JVD, No murmurs, No edema  Respiratory: Lungs clear to auscultation	  Psychiatry: A & O x 3, Mood & affect appropriate  Gastrointestinal:  Soft, Non-tender, + BS	  Skin: No rashes, No ecchymoses, No cyanosis	  Neurologic: Non-focal  Extremities: Normal range of motion,  left heel wound w central eschar, no bogginess, no signs of infection. right heel with no open wounds or lesions  Vascular: + pvd    TELEMETRY: 	    ECG:  	  RADIOLOGY:  OTHER: 	  	  LABS:	 	    CARDIAC MARKERS:                              9.1    9.42  )-----------( 162      ( 14 Mar 2023 14:29 )             28.6     03-14    137  |  99  |  34<H>  ----------------------------<  281<H>  3.5   |  27  |  3.86<H>    Ca    8.9      14 Mar 2023 14:29    TPro  6.2  /  Alb  3.9  /  TBili  0.3  /  DBili  x   /  AST  7<L>  /  ALT  8<L>  /  AlkPhos  101  03-14    proBNP:   Lipid Profile:   HgA1c:   TSH:   PT/INR - ( 14 Mar 2023 14:32 )   PT: 12.6 sec;   INR: 1.09 ratio         PTT - ( 14 Mar 2023 14:32 )  PTT:32.6 sec    PREVIOUS DIAGNOSTIC TESTING:    < from: 12 Lead ECG (12.29.22 @ 10:00) >  Diagnosis Line NORMAL SINUS RHYTHM  T WAVE ABNORMALITY, CONSIDER LATERAL ISCHEMIA  ABNORMAL ECG  WHEN COMPARED WITH ECG OF 13-DEC-2022  NO SIGNIFICANT CHANGE WAS FOUND    < from: TTE with Doppler (w/Cont) (10.28.22 @ 12:56) >  1. Moderate segmental left ventricular systolic  dysfunction.  Hypokinenesis of the inferior, inferolateral  wall, apex, apial septum, antewrolateral wall.  Endocardial visualization enhanced with intravenous  injection of Ultrasonic Enhancing Agent (Definity). No left  ventricular thrombus.  2. Increased E/e'  is consistent with elevated left  ventricular filling pressure.  3. The right ventricle is not well visualized; grossly  normal right ventricular systolic function.               (14 Mar 2023 23:12)      PAST MEDICAL & SURGICAL HISTORY:  Diabetes Mellitus Type II, Uncontrolled      Dyslipidemia      s/p Angioplasty with Stent      HTN (hypertension)      Gout      Diabetic retinopathy      GERD (gastroesophageal reflux disease)      Nephrolithiasis      Vitamin D deficiency      Hepatitis      CKD (chronic kidney disease)      Ischemic cardiomyopathy      ASHD (arteriosclerotic heart disease)      Pulmonary hypertension      Myocardial infarction      CAD (coronary artery disease)      BPH (benign prostatic hyperplasia)      Retinal Hemorrhage      S/P coronary artery stent placement  2010 TRICIA to Diag ; 11/18/2010  LAD; 2/4/11 ATOM liberte Diag; 5/15/2017  TRICIA to 1st diag; 6/7/17 PCI with laser and high pressure balloon      History of eye surgery  left eye      H/O lithotripsy      Diabetes with retinopathy  S/P PPV/PRP/GAS  OD 2/22/17 for viterous hemorrhage      CHF with cardiomyopathy  Insertion of Cardiomems monitor      Stented coronary artery          MEDICATIONS  (STANDING):  allopurinol 100 milliGRAM(s) Oral at bedtime  ascorbic acid 500 milliGRAM(s) Oral daily  aspirin enteric coated 81 milliGRAM(s) Oral daily  atorvastatin 80 milliGRAM(s) Oral at bedtime  carvedilol 12.5 milliGRAM(s) Oral every 12 hours  cephalexin 500 milliGRAM(s) Oral daily  chlorhexidine 4% Liquid 1 Application(s) Topical once  clopidogrel Tablet 75 milliGRAM(s) Oral daily  dextrose 5%. 1000 milliLiter(s) (50 mL/Hr) IV Continuous <Continuous>  dextrose 5%. 1000 milliLiter(s) (100 mL/Hr) IV Continuous <Continuous>  dextrose 50% Injectable 25 Gram(s) IV Push once  dextrose 50% Injectable 12.5 Gram(s) IV Push once  dextrose 50% Injectable 25 Gram(s) IV Push once  famotidine    Tablet 10 milliGRAM(s) Oral daily  folic acid 1 milliGRAM(s) Oral daily  glucagon  Injectable 1 milliGRAM(s) IntraMuscular once  heparin   Injectable 5000 Unit(s) SubCutaneous every 12 hours  insulin lispro (ADMELOG) corrective regimen sliding scale   SubCutaneous three times a day before meals  insulin lispro (ADMELOG) corrective regimen sliding scale   SubCutaneous at bedtime  lidocaine 2% Gel 1 Application(s) Topical <User Schedule>  lidocaine 4% Cream 1 Application(s) Topical once  melatonin 3 milliGRAM(s) Oral at bedtime  pregabalin 75 milliGRAM(s) Oral at bedtime  senna 2 Tablet(s) Oral at bedtime  sevelamer carbonate 800 milliGRAM(s) Oral three times a day with meals  tamsulosin 0.4 milliGRAM(s) Oral at bedtime  valsartan 40 milliGRAM(s) Oral daily    MEDICATIONS  (PRN):  acetaminophen     Tablet .. 650 milliGRAM(s) Oral every 6 hours PRN Temp greater or equal to 38C (100.4F), Moderate Pain (4 - 6)  dextrose Oral Gel 15 Gram(s) Oral once PRN Blood Glucose LESS THAN 70 milliGRAM(s)/deciliter      Allergies    No Known Drug Allergies  shellfish (Unknown)    Intolerances        VITALS:    Vital Signs Last 24 Hrs  T(C): 36.2 (20 Mar 2023 13:10), Max: 37.2 (20 Mar 2023 05:19)  T(F): 97.2 (20 Mar 2023 13:10), Max: 99 (20 Mar 2023 05:19)  HR: 79 (20 Mar 2023 13:10) (73 - 85)  BP: 156/82 (20 Mar 2023 13:10) (145/75 - 167/79)  BP(mean): --  RR: 18 (20 Mar 2023 13:10) (18 - 18)  SpO2: 100% (20 Mar 2023 13:10) (96% - 100%)    Parameters below as of 20 Mar 2023 13:10  Patient On (Oxygen Delivery Method): room air        LABS:                CAPILLARY BLOOD GLUCOSE      POCT Blood Glucose.: 321 mg/dL (20 Mar 2023 12:27)  POCT Blood Glucose.: 220 mg/dL (20 Mar 2023 08:35)  POCT Blood Glucose.: 254 mg/dL (19 Mar 2023 21:20)  POCT Blood Glucose.: 246 mg/dL (19 Mar 2023 17:18)          LOWER EXTREMITY PHYSICAL EXAM:    Vascular: DP/PT _14, B/L, CFT <3 seconds B/L, Temperature gradient wnl, B/L.   Neuro: Epicritic sensation absent to the level of toes, B/L.  Musculoskeletal/Ortho: Skin: necrotic ulcer posterior left heel  no signs of cellulitis both feet
           CARDIOLOGY     PROGRESS  NOTE   ________________________________________________    CHIEF COMPLAINT:Patient is a 62y old  Male who presents with a chief complaint of Fever (17 Mar 2023 11:43)  doing better, no complain  	  REVIEW OF SYSTEMS:  CONSTITUTIONAL: No fever, weight loss, or fatigue  EYES: No eye pain, visual disturbances, or discharge  ENT:  No difficulty hearing, tinnitus, vertigo; No sinus or throat pain  NECK: No pain or stiffness  RESPIRATORY: No cough, wheezing, chills or hemoptysis; No Shortness of Breath  CARDIOVASCULAR: No chest pain, palpitations, passing out, dizziness, or leg swelling  GASTROINTESTINAL: No abdominal or epigastric pain. No nausea, vomiting, or hematemesis; No diarrhea or constipation. No melena or hematochezia.  GENITOURINARY: No dysuria, frequency, hematuria, or incontinence  NEUROLOGICAL: No headaches, memory loss, loss of strength, numbness, or tremors  SKIN: No itching, burning, rashes, or lesions   LYMPH Nodes: No enlarged glands  ENDOCRINE: No heat or cold intolerance; No hair loss  MUSCULOSKELETAL: No joint pain or swelling; No muscle, back, or extremity pain  PSYCHIATRIC: No depression, anxiety, mood swings, or difficulty sleeping  HEME/LYMPH: No easy bruising, or bleeding gums  ALLERGY AND IMMUNOLOGIC: No hives or eczema	    [x ] All others negative	  [ ] Unable to obtain    PHYSICAL EXAM:  T(C): 36.7 (03-18-23 @ 09:10), Max: 37.2 (03-18-23 @ 05:15)  HR: 79 (03-18-23 @ 09:10) (76 - 91)  BP: 131/73 (03-18-23 @ 09:10) (100/60 - 148/89)  RR: 18 (03-18-23 @ 09:10) (18 - 20)  SpO2: 98% (03-18-23 @ 09:10) (96% - 100%)  Wt(kg): --  I&O's Summary    17 Mar 2023 07:01  -  18 Mar 2023 07:00  --------------------------------------------------------  IN: 0 mL / OUT: 2200 mL / NET: -2200 mL        Appearance: Normal	  HEENT:   Normal oral mucosa, PERRL, EOMI	  Lymphatic: No lymphadenopathy  Cardiovascular: Normal S1 S2, No JVD, + murmurs, No edema  Respiratory: rhonchi  Psychiatry: A & O x 3, Mood & affect appropriate  Gastrointestinal:  Soft, Non-tender, + BS	  Skin: No rashes, No ecchymoses, No cyanosis	  Neurologic: Non-focal  Extremities: Normal range of motion, l heel ulcer/ eschar  Vascular: Peripheral pulses palpable 2+ bilaterally    MEDICATIONS  (STANDING):  allopurinol 100 milliGRAM(s) Oral at bedtime  ascorbic acid 500 milliGRAM(s) Oral daily  aspirin enteric coated 81 milliGRAM(s) Oral daily  atorvastatin 80 milliGRAM(s) Oral at bedtime  carvedilol 12.5 milliGRAM(s) Oral every 12 hours  chlorhexidine 4% Liquid 1 Application(s) Topical once  clopidogrel Tablet 75 milliGRAM(s) Oral daily  dextrose 5%. 1000 milliLiter(s) (50 mL/Hr) IV Continuous <Continuous>  dextrose 5%. 1000 milliLiter(s) (100 mL/Hr) IV Continuous <Continuous>  dextrose 50% Injectable 25 Gram(s) IV Push once  dextrose 50% Injectable 12.5 Gram(s) IV Push once  dextrose 50% Injectable 25 Gram(s) IV Push once  famotidine    Tablet 10 milliGRAM(s) Oral daily  folic acid 1 milliGRAM(s) Oral daily  glucagon  Injectable 1 milliGRAM(s) IntraMuscular once  heparin   Injectable 5000 Unit(s) SubCutaneous every 12 hours  insulin lispro (ADMELOG) corrective regimen sliding scale   SubCutaneous at bedtime  insulin lispro (ADMELOG) corrective regimen sliding scale   SubCutaneous three times a day before meals  lidocaine 2% Gel 1 Application(s) Topical <User Schedule>  melatonin 3 milliGRAM(s) Oral at bedtime  piperacillin/tazobactam IVPB.. 3.375 Gram(s) IV Intermittent every 12 hours  pregabalin 75 milliGRAM(s) Oral at bedtime  senna 2 Tablet(s) Oral at bedtime  sevelamer carbonate 800 milliGRAM(s) Oral three times a day with meals  tamsulosin 0.4 milliGRAM(s) Oral at bedtime  valsartan 40 milliGRAM(s) Oral daily      TELEMETRY: 	    ECG:  	  RADIOLOGY:  OTHER: 	  	  LABS:	 	    CARDIAC MARKERS:                                9.0    9.83  )-----------( 208      ( 17 Mar 2023 22:23 )             27.3     03-18    133<L>  |  93<L>  |  35<H>  ----------------------------<  211<H>  4.2   |  24  |  5.35<H>    Ca    8.6      18 Mar 2023 07:16  Phos  4.0     03-18  Mg     2.2     03-17    TPro  6.0  /  Alb  3.5  /  TBili  0.3  /  DBili  x   /  AST  8<L>  /  ALT  <5<L>  /  AlkPhos  99  03-17    proBNP:   Lipid Profile:   HgA1c:   TSH:   -Stop Zosyn  -Reasonable to complete 7 day course total of antibiotics (including 3 days received of Zosyn) - can discharge on 4 days of PO Keflex (500mg Q24h, on dialysis days to take post-HD).    -Local wound care for L heel ulcer as per Primary Team   -Outpatient Podiatry follow up   RVP with enterorhinovirus   Outpatient wound culture with Staph lugdunensis  Wound does not look obvious infected now  Suspect fever was secondary to Enterorhinovirus  Can empirically complete 7 day total course of antibiotics with Cephalexin        Assessment and plan  ---------------------------  62-year-old male patient past medical history of yet end-stage renal disease on dialysis MWF, CAD status post stents, CHF, cardiomyopathy, diabetes who presents to the ED for fever that began this morning.  Per wife, patient has been experiencing an unsteady gait since this AM.  Yesterday's HD session ended a little bit early than normal given patient was feeling unwell, and was found hypotensive.  Fever today was 101.4 at home and wife gave Tylenol at the time.  Patient with left heel wound that has been taking care off and dressed daily by wife.  Endorsing mild drainage for the past week, and was seen by podiatry and found with positive heel culture.  pt was seen in my office with L heel eschar sent to wound care  podiatry appreciated  wound consult in am  no abx as per podiatry  culture has sent  s/p recent stent continue all cardiac meds  esrd on hd  htn, continue  blocker  physical therapy  increase bp will adjust cardiac meds, restart on coreg and valsartan  wound consult/ ID appreciated  physical therapy  dc planning    	        
           CARDIOLOGY     PROGRESS  NOTE   ________________________________________________    CHIEF COMPLAINT:Patient is a 62y old  Male who presents with a chief complaint of pvd/ sepsis (15 Mar 2023 08:59)  no complain  	  REVIEW OF SYSTEMS:  CONSTITUTIONAL: No fever, weight loss, or fatigue  EYES: No eye pain, visual disturbances, or discharge  ENT:  No difficulty hearing, tinnitus, vertigo; No sinus or throat pain  NECK: No pain or stiffness  RESPIRATORY: No cough, wheezing, chills or hemoptysis; No Shortness of Breath  CARDIOVASCULAR: No chest pain, palpitations, passing out, dizziness, or leg swelling  GASTROINTESTINAL: No abdominal or epigastric pain. No nausea, vomiting, or hematemesis; No diarrhea or constipation. No melena or hematochezia.  GENITOURINARY: No dysuria, frequency, hematuria, or incontinence  NEUROLOGICAL: No headaches, memory loss, loss of strength, numbness, or tremors  SKIN: No itching, burning, rashes, or lesions   LYMPH Nodes: No enlarged glands  ENDOCRINE: No heat or cold intolerance; No hair loss  MUSCULOSKELETAL: No joint pain or swelling; No muscle, back, or extremity pain  PSYCHIATRIC: No depression, anxiety, mood swings, or difficulty sleeping  HEME/LYMPH: No easy bruising, or bleeding gums  ALLERGY AND IMMUNOLOGIC: No hives or eczema	    [x ] All others negative	  [ ] Unable to obtain    PHYSICAL EXAM:  T(C): 36.7 (03-16-23 @ 04:38), Max: 37.4 (03-15-23 @ 13:57)  HR: 77 (03-16-23 @ 04:38) (77 - 102)  BP: 127/72 (03-16-23 @ 04:38) (125/67 - 151/79)  RR: 18 (03-16-23 @ 04:38) (16 - 18)  SpO2: 95% (03-16-23 @ 04:38) (94% - 100%)  Wt(kg): --  I&O's Summary    15 Mar 2023 07:01  -  16 Mar 2023 07:00  --------------------------------------------------------  IN: 800 mL / OUT: 3000 mL / NET: -2200 mL        Appearance: Normal	  HEENT:   Normal oral mucosa, PERRL, EOMI	  Lymphatic: No lymphadenopathy  Cardiovascular: Normal S1 S2, No JVD, No murmurs, No edema  Respiratory: Lungs clear to auscultation	  Psychiatry: A & O x 3, Mood & affect appropriate  Gastrointestinal:  Soft, Non-tender, + BS	  Skin: No rashes, No ecchymoses, No cyanosis	  Extremities: Normal range of motion, l heel foot ulcer  Vascular: + pvd    MEDICATIONS  (STANDING):  allopurinol 100 milliGRAM(s) Oral at bedtime  ascorbic acid 500 milliGRAM(s) Oral daily  aspirin enteric coated 81 milliGRAM(s) Oral daily  atorvastatin 80 milliGRAM(s) Oral at bedtime  carvedilol 12.5 milliGRAM(s) Oral every 12 hours  chlorhexidine 4% Liquid 1 Application(s) Topical once  clopidogrel Tablet 75 milliGRAM(s) Oral daily  dextrose 5%. 1000 milliLiter(s) (50 mL/Hr) IV Continuous <Continuous>  dextrose 5%. 1000 milliLiter(s) (100 mL/Hr) IV Continuous <Continuous>  dextrose 50% Injectable 25 Gram(s) IV Push once  dextrose 50% Injectable 12.5 Gram(s) IV Push once  dextrose 50% Injectable 25 Gram(s) IV Push once  famotidine    Tablet 10 milliGRAM(s) Oral daily  folic acid 1 milliGRAM(s) Oral daily  glucagon  Injectable 1 milliGRAM(s) IntraMuscular once  heparin   Injectable 5000 Unit(s) SubCutaneous every 12 hours  insulin lispro (ADMELOG) corrective regimen sliding scale   SubCutaneous at bedtime  insulin lispro (ADMELOG) corrective regimen sliding scale   SubCutaneous three times a day before meals  lidocaine 2% Gel 1 Application(s) Topical <User Schedule>  melatonin 3 milliGRAM(s) Oral at bedtime  piperacillin/tazobactam IVPB.. 3.375 Gram(s) IV Intermittent every 12 hours  pregabalin 75 milliGRAM(s) Oral at bedtime  senna 2 Tablet(s) Oral at bedtime  sevelamer carbonate 800 milliGRAM(s) Oral three times a day with meals  tamsulosin 0.4 milliGRAM(s) Oral at bedtime  valsartan 40 milliGRAM(s) Oral daily      TELEMETRY: 	    ECG:  	  RADIOLOGY:  OTHER: 	  	  LABS:	 	    CARDIAC MARKERS:                                9.1    9.42  )-----------( 162      ( 14 Mar 2023 14:29 )             28.6     03-14    137  |  99  |  34<H>  ----------------------------<  281<H>  3.5   |  27  |  3.86<H>    Ca    8.9      14 Mar 2023 14:29    TPro  6.2  /  Alb  3.9  /  TBili  0.3  /  DBili  x   /  AST  7<L>  /  ALT  8<L>  /  AlkPhos  101  03-14    proBNP:   Lipid Profile:   HgA1c:   TSH:   PT/INR - ( 14 Mar 2023 14:32 )   PT: 12.6 sec;   INR: 1.09 ratio         PTT - ( 14 Mar 2023 14:32 )  PTT:32.6 sec    1 Renal - HD today and will aim for 2 kg removal   Cont Renvela   2 Anemia-Retacrit at HD   3 Podiatry - Is there a need for MRI of the heal to definitively rule out osteo   IV abx     Culture - Blood (03.14.23 @ 13:55)    Specimen Source: .Blood Blood-Peripheral    Culture Results:   No growth to date.      Assessment and plan  ---------------------------  62-year-old male patient past medical history of yet end-stage renal disease on dialysis MWF, CAD status post stents, CHF, cardiomyopathy, diabetes who presents to the ED for fever that began this morning.  Per wife, patient has been experiencing an unsteady gait since this AM.  Yesterday's HD session ended a little bit early than normal given patient was feeling unwell, and was found hypotensive.  Fever today was 101.4 at home and wife gave Tylenol at the time.  Patient with left heel wound that has been taking care off and dressed daily by wife.  Endorsing mild drainage for the past week, and was seen by podiatry and found with positive heel culture.  pt was seen in my office with L heel eschar sent to wound care  podiatry appreciated  wound consult in am  no abx as per podiatry  culture has sent  s/p recent stent continue all cardiac meds  esrd on hd  htn, continue  blocker  physical therapy  increase bp will adjust cardiac meds, restart on coreg and valsartan  wound consult/ ID awaiting  physical therapy  med consult dr naranjo    	        
           CARDIOLOGY     PROGRESS  NOTE   ________________________________________________    CHIEF COMPLAINT:Patient is a 62y old  Male who presents with a chief complaint of pvd/ sepsis (18 Mar 2023 19:33)  no complain  	  REVIEW OF SYSTEMS:  CONSTITUTIONAL: No fever, weight loss, or fatigue  EYES: No eye pain, visual disturbances, or discharge  ENT:  No difficulty hearing, tinnitus, vertigo; No sinus or throat pain  NECK: No pain or stiffness  RESPIRATORY: No cough, wheezing, chills or hemoptysis; No Shortness of Breath  CARDIOVASCULAR: No chest pain, palpitations, passing out, dizziness, or leg swelling  GASTROINTESTINAL: No abdominal or epigastric pain. No nausea, vomiting, or hematemesis; No diarrhea or constipation. No melena or hematochezia.  GENITOURINARY: No dysuria, frequency, hematuria, or incontinence  NEUROLOGICAL: No headaches, memory loss, loss of strength, numbness, or tremors  SKIN: No itching, burning, rashes, or lesions   LYMPH Nodes: No enlarged glands  ENDOCRINE: No heat or cold intolerance; No hair loss  MUSCULOSKELETAL: No joint pain or swelling; No muscle, back, or extremity pain  PSYCHIATRIC: No depression, anxiety, mood swings, or difficulty sleeping  HEME/LYMPH: No easy bruising, or bleeding gums  ALLERGY AND IMMUNOLOGIC: No hives or eczema	    [x ] All others negative	  [ ] Unable to obtain    PHYSICAL EXAM:  T(C): 36.8 (03-19-23 @ 05:37), Max: 37.1 (03-18-23 @ 17:58)  HR: 85 (03-19-23 @ 05:37) (81 - 85)  BP: 143/73 (03-19-23 @ 05:37) (143/73 - 168/84)  RR: 18 (03-19-23 @ 05:37) (18 - 18)  SpO2: 97% (03-19-23 @ 05:37) (97% - 99%)  Wt(kg): --  I&O's Summary      Appearance: Normal	  HEENT:   Normal oral mucosa, PERRL, EOMI	  Lymphatic: No lymphadenopathy  Cardiovascular: Normal S1 S2, No JVD, No murmurs, No edema  Respiratory: Lungs clear to auscultation	  Psychiatry: A & O x 3, Mood & affect appropriate  Gastrointestinal:  Soft, Non-tender, + BS	  Skin: No rashes, No ecchymoses, No cyanosis	  Neurologic: Non-focal  Extremities: Normal range of motion, l heel eschat  Vascular: + pvd    MEDICATIONS  (STANDING):  allopurinol 100 milliGRAM(s) Oral at bedtime  ascorbic acid 500 milliGRAM(s) Oral daily  aspirin enteric coated 81 milliGRAM(s) Oral daily  atorvastatin 80 milliGRAM(s) Oral at bedtime  carvedilol 12.5 milliGRAM(s) Oral every 12 hours  cephalexin 500 milliGRAM(s) Oral daily  chlorhexidine 4% Liquid 1 Application(s) Topical once  clopidogrel Tablet 75 milliGRAM(s) Oral daily  dextrose 5%. 1000 milliLiter(s) (50 mL/Hr) IV Continuous <Continuous>  dextrose 5%. 1000 milliLiter(s) (100 mL/Hr) IV Continuous <Continuous>  dextrose 50% Injectable 25 Gram(s) IV Push once  dextrose 50% Injectable 12.5 Gram(s) IV Push once  dextrose 50% Injectable 25 Gram(s) IV Push once  famotidine    Tablet 10 milliGRAM(s) Oral daily  folic acid 1 milliGRAM(s) Oral daily  glucagon  Injectable 1 milliGRAM(s) IntraMuscular once  heparin   Injectable 5000 Unit(s) SubCutaneous every 12 hours  insulin lispro (ADMELOG) corrective regimen sliding scale   SubCutaneous three times a day before meals  insulin lispro (ADMELOG) corrective regimen sliding scale   SubCutaneous at bedtime  lidocaine 2% Gel 1 Application(s) Topical <User Schedule>  melatonin 3 milliGRAM(s) Oral at bedtime  pregabalin 75 milliGRAM(s) Oral at bedtime  senna 2 Tablet(s) Oral at bedtime  sevelamer carbonate 800 milliGRAM(s) Oral three times a day with meals  tamsulosin 0.4 milliGRAM(s) Oral at bedtime  valsartan 40 milliGRAM(s) Oral daily      TELEMETRY: 	    ECG:  	  RADIOLOGY:  OTHER: 	  	  LABS:	 	    CARDIAC MARKERS:                                9.0    9.83  )-----------( 208      ( 17 Mar 2023 22:23 )             27.3     03-18    133<L>  |  93<L>  |  35<H>  ----------------------------<  211<H>  4.2   |  24  |  5.35<H>    Ca    8.6      18 Mar 2023 07:16  Phos  4.0     03-18  Mg     2.2     03-17    TPro  6.0  /  Alb  3.5  /  TBili  0.3  /  DBili  x   /  AST  8<L>  /  ALT  <5<L>  /  AlkPhos  99  03-17    proBNP:   Lipid Profile:   HgA1c:   TSH:         Assessment and plan  ---------------------------  62-year-old male patient past medical history of yet end-stage renal disease on dialysis MWF, CAD status post stents, CHF, cardiomyopathy, diabetes who presents to the ED for fever that began this morning.  Per wife, patient has been experiencing an unsteady gait since this AM.  Yesterday's HD session ended a little bit early than normal given patient was feeling unwell, and was found hypotensive.  Fever today was 101.4 at home and wife gave Tylenol at the time.  Patient with left heel wound that has been taking care off and dressed daily by wife.  Endorsing mild drainage for the past week, and was seen by podiatry and found with positive heel culture.  pt was seen in my office with L heel eschar sent to wound care  podiatry appreciated  wound consult in am  no abx as per podiatry  culture has sent  s/p recent stent continue all cardiac meds  esrd on hd  htn, continue  blocker  physical therapy  increase bp will adjust cardiac meds, restart on coreg and valsartan  wound consult/ ID appreciated  physical therapy  dc planning tomorrow after HD hopefully first  case  fu dr aguirre in 2 weeks        	        
           CARDIOLOGY     PROGRESS  NOTE   ________________________________________________    CHIEF COMPLAINT:Patient is a 62y old  Male who presents with a chief complaint of pvd/ sepsis (14 Mar 2023 23:12)  no complain  	  REVIEW OF SYSTEMS:  CONSTITUTIONAL: No fever, weight loss, or fatigue  EYES: No eye pain, visual disturbances, or discharge  ENT:  No difficulty hearing, tinnitus, vertigo; No sinus or throat pain  NECK: No pain or stiffness  RESPIRATORY: No cough, wheezing, chills or hemoptysis; No Shortness of Breath  CARDIOVASCULAR: No chest pain, palpitations, passing out, dizziness, or leg swelling  GASTROINTESTINAL: No abdominal or epigastric pain. No nausea, vomiting, or hematemesis; No diarrhea or constipation. No melena or hematochezia.  GENITOURINARY: No dysuria, frequency, hematuria, or incontinence  NEUROLOGICAL: No headaches, memory loss, loss of strength, numbness, or tremors  SKIN: No itching, burning, rashes, or lesions   LYMPH Nodes: No enlarged glands  ENDOCRINE: No heat or cold intolerance; No hair loss  MUSCULOSKELETAL: No joint pain or swelling; No muscle, back, or extremity pain  PSYCHIATRIC: No depression, anxiety, mood swings, or difficulty sleeping  HEME/LYMPH: No easy bruising, or bleeding gums  ALLERGY AND IMMUNOLOGIC: No hives or eczema	    [x ] All others negative	  [ ] Unable to obtain    PHYSICAL EXAM:  T(C): 37 (03-15-23 @ 05:29), Max: 38.1 (03-14-23 @ 19:25)  HR: 95 (03-15-23 @ 05:29) (81 - 114)  BP: 170/81 (03-15-23 @ 05:29) (113/57 - 196/87)  RR: 18 (03-15-23 @ 05:29) (14 - 20)  SpO2: 97% (03-15-23 @ 05:29) (93% - 99%)  Wt(kg): --  I&O's Summary    14 Mar 2023 07:01  -  15 Mar 2023 07:00  --------------------------------------------------------  IN: 0 mL / OUT: 250 mL / NET: -250 mL        Appearance: Normal	  HEENT:   Normal oral mucosa, PERRL, EOMI	  Lymphatic: No lymphadenopathy  Cardiovascular: Normal S1 S2, No JVD, +murmurs, No edema  Respiratory:rhonchi  Psychiatry: A & O x 3, Mood & affect appropriate  Gastrointestinal:  Soft, Non-tender, + BS	  Skin: No rashes, No ecchymoses, No cyanosis	  Neurologic: Non-focal  Extremities: Normal range of motion, No clubbing, cyanosis or edema  Vascular: Peripheral pulses palpable 2+ bilaterally    MEDICATIONS  (STANDING):  allopurinol 100 milliGRAM(s) Oral at bedtime  ascorbic acid 500 milliGRAM(s) Oral daily  aspirin enteric coated 81 milliGRAM(s) Oral daily  atorvastatin 80 milliGRAM(s) Oral at bedtime  clopidogrel Tablet 75 milliGRAM(s) Oral daily  dextrose 5%. 1000 milliLiter(s) (50 mL/Hr) IV Continuous <Continuous>  dextrose 5%. 1000 milliLiter(s) (100 mL/Hr) IV Continuous <Continuous>  dextrose 50% Injectable 25 Gram(s) IV Push once  dextrose 50% Injectable 12.5 Gram(s) IV Push once  dextrose 50% Injectable 25 Gram(s) IV Push once  famotidine    Tablet 10 milliGRAM(s) Oral daily  folic acid 1 milliGRAM(s) Oral daily  glucagon  Injectable 1 milliGRAM(s) IntraMuscular once  heparin   Injectable 5000 Unit(s) SubCutaneous every 12 hours  insulin lispro (ADMELOG) corrective regimen sliding scale   SubCutaneous three times a day before meals  melatonin 3 milliGRAM(s) Oral at bedtime  metoprolol succinate ER 25 milliGRAM(s) Oral daily  piperacillin/tazobactam IVPB.. 3.375 Gram(s) IV Intermittent every 12 hours  pregabalin 75 milliGRAM(s) Oral at bedtime  senna 2 Tablet(s) Oral at bedtime  sevelamer carbonate 800 milliGRAM(s) Oral three times a day with meals  tamsulosin 0.4 milliGRAM(s) Oral at bedtime      TELEMETRY: 	    ECG:  	  RADIOLOGY:  OTHER: 	  	  LABS:	 	    CARDIAC MARKERS:                                9.1    9.42  )-----------( 162      ( 14 Mar 2023 14:29 )             28.6     03-14    137  |  99  |  34<H>  ----------------------------<  281<H>  3.5   |  27  |  3.86<H>    Ca    8.9      14 Mar 2023 14:29    TPro  6.2  /  Alb  3.9  /  TBili  0.3  /  DBili  x   /  AST  7<L>  /  ALT  8<L>  /  AlkPhos  101  03-14    proBNP:   Lipid Profile:   HgA1c:   TSH:   PT/INR - ( 14 Mar 2023 14:32 )   PT: 12.6 sec;   INR: 1.09 ratio         PTT - ( 14 Mar 2023 14:32 )  PTT:32.6 sec      Assessment and plan  ---------------------------  62-year-old male patient past medical history of yet end-stage renal disease on dialysis MWF, CAD status post stents, CHF, cardiomyopathy, diabetes who presents to the ED for fever that began this morning.  Per wife, patient has been experiencing an unsteady gait since this AM.  Yesterday's HD session ended a little bit early than normal given patient was feeling unwell, and was found hypotensive.  Fever today was 101.4 at home and wife gave Tylenol at the time.  Patient with left heel wound that has been taking care off and dressed daily by wife.  Endorsing mild drainage for the past week, and was seen by podiatry and found with positive heel culture.  pt was seen in my office with L heel eschar sent to wound care  podiatry appreciated  wound consult in am  no abx as per cardiology  culture has sent  s/p recent stent continue all cardiac meds  esrd on hd  htn, continue  blocker  physical therapy  increase bp will adjust cardiac meds, restart on coreg and valsartan  wound consult    	        
           CARDIOLOGY     PROGRESS  NOTE   ________________________________________________    CHIEF COMPLAINT:Patient is a 62y old  Male who presents with a chief complaint of pvd/ sepsis (16 Mar 2023 10:41)  no complain  	  REVIEW OF SYSTEMS:  CONSTITUTIONAL: No fever, weight loss, or fatigue  EYES: No eye pain, visual disturbances, or discharge  ENT:  No difficulty hearing, tinnitus, vertigo; No sinus or throat pain  NECK: No pain or stiffness  RESPIRATORY: No cough, wheezing, chills or hemoptysis; No Shortness of Breath  CARDIOVASCULAR: No chest pain, palpitations, passing out, dizziness, or leg swelling  GASTROINTESTINAL: No abdominal or epigastric pain. No nausea, vomiting, or hematemesis; No diarrhea or constipation. No melena or hematochezia.  GENITOURINARY: No dysuria, frequency, hematuria, or incontinence  NEUROLOGICAL: No headaches, memory loss, loss of strength, numbness, or tremors  SKIN: No itching, burning, rashes, or lesions   LYMPH Nodes: No enlarged glands  ENDOCRINE: No heat or cold intolerance; No hair loss  MUSCULOSKELETAL: No joint pain or swelling; No muscle, back, or extremity pain  PSYCHIATRIC: No depression, anxiety, mood swings, or difficulty sleeping  HEME/LYMPH: No easy bruising, or bleeding gums  ALLERGY AND IMMUNOLOGIC: No hives or eczema	    [x ] All others negative	  [ ] Unable to obtain    PHYSICAL EXAM:  T(C): 37.1 (03-17-23 @ 05:15), Max: 37.3 (03-16-23 @ 21:02)  HR: 73 (03-17-23 @ 05:15) (63 - 76)  BP: 141/73 (03-17-23 @ 05:15) (109/62 - 148/79)  RR: 18 (03-17-23 @ 05:15) (18 - 18)  SpO2: 98% (03-17-23 @ 05:15) (94% - 98%)  Wt(kg): --  I&O's Summary      Appearance: Normal	  HEENT:   Normal oral mucosa, PERRL, EOMI	  Lymphatic: No lymphadenopathy  Cardiovascular: Normal S1 S2, No JVD, + murmurs, No edema  Respiratory: rhonchi  Gastrointestinal:  Soft, Non-tender, + BS	  Skin: No rashes, No ecchymoses, No cyanosis	  Neurologic: can not asses  Extremities: Normal range of motion, No clubbing, cyanosis or edema  Vascular: Peripheral pulses palpable 2+ bilaterally    MEDICATIONS  (STANDING):  allopurinol 100 milliGRAM(s) Oral at bedtime  ascorbic acid 500 milliGRAM(s) Oral daily  aspirin enteric coated 81 milliGRAM(s) Oral daily  atorvastatin 80 milliGRAM(s) Oral at bedtime  carvedilol 12.5 milliGRAM(s) Oral every 12 hours  chlorhexidine 4% Liquid 1 Application(s) Topical once  clopidogrel Tablet 75 milliGRAM(s) Oral daily  dextrose 5%. 1000 milliLiter(s) (50 mL/Hr) IV Continuous <Continuous>  dextrose 5%. 1000 milliLiter(s) (100 mL/Hr) IV Continuous <Continuous>  dextrose 50% Injectable 25 Gram(s) IV Push once  dextrose 50% Injectable 12.5 Gram(s) IV Push once  dextrose 50% Injectable 25 Gram(s) IV Push once  famotidine    Tablet 10 milliGRAM(s) Oral daily  folic acid 1 milliGRAM(s) Oral daily  glucagon  Injectable 1 milliGRAM(s) IntraMuscular once  heparin   Injectable 5000 Unit(s) SubCutaneous every 12 hours  insulin lispro (ADMELOG) corrective regimen sliding scale   SubCutaneous at bedtime  insulin lispro (ADMELOG) corrective regimen sliding scale   SubCutaneous three times a day before meals  lidocaine 2% Gel 1 Application(s) Topical <User Schedule>  melatonin 3 milliGRAM(s) Oral at bedtime  piperacillin/tazobactam IVPB.. 3.375 Gram(s) IV Intermittent every 12 hours  pregabalin 75 milliGRAM(s) Oral at bedtime  senna 2 Tablet(s) Oral at bedtime  sevelamer carbonate 800 milliGRAM(s) Oral three times a day with meals  tamsulosin 0.4 milliGRAM(s) Oral at bedtime  valsartan 40 milliGRAM(s) Oral daily      TELEMETRY: 	    ECG:  	  RADIOLOGY:  OTHER: 	  	  LABS:	 	    CARDIAC MARKERS:        proBNP:   Lipid Profile:   HgA1c:   TSH:         Assessment and plan  ---------------------------  62-year-old male patient past medical history of yet end-stage renal disease on dialysis MWF, CAD status post stents, CHF, cardiomyopathy, diabetes who presents to the ED for fever that began this morning.  Per wife, patient has been experiencing an unsteady gait since this AM.  Yesterday's HD session ended a little bit early than normal given patient was feeling unwell, and was found hypotensive.  Fever today was 101.4 at home and wife gave Tylenol at the time.  Patient with left heel wound that has been taking care off and dressed daily by wife.  Endorsing mild drainage for the past week, and was seen by podiatry and found with positive heel culture.  pt was seen in my office with L heel eschar sent to wound care  podiatry appreciated  wound consult in am  no abx as per podiatry  culture has sent  s/p recent stent continue all cardiac meds  esrd on hd  htn, continue  blocker  physical therapy  increase bp will adjust cardiac meds, restart on coreg and valsartan  wound consult/ ID awaiting  physical therapy      	        
    afberile  REVIEW OF SYSTEMS:  CONSTITUTIONAL: No fever,  no  weight loss  ENT:  No  tinnitus,   no   vertigo  NECK: No pain or stiffness  RESPIRATORY: No cough, wheezing, chills or hemoptysis;    No Shortness of Breath  CARDIOVASCULAR: No chest pain, palpitations, dizziness  GASTROINTESTINAL: No abdominal or epigastric pain. No nausea, vomiting, or hematemesis; No diarrhea  No melena or hematochezia.  GENITOURINARY: No dysuria, frequency, hematuria, or incontinence  NEUROLOGICAL: No headaches  SKIN: No itching,  no   rash  LYMPH Nodes: No enlarged glands  ENDOCRINE: No heat or cold intolerance  MUSCULOSKELETAL: No joint pain or swelling  PSYCHIATRIC: No depression, anxiety  HEME/LYMPH: No easy bruising, or bleeding gums  ALLERGY AND IMMUNOLOGIC: No hives or eczema	    MEDICATIONS  (STANDING):  allopurinol 100 milliGRAM(s) Oral at bedtime  ascorbic acid 500 milliGRAM(s) Oral daily  aspirin enteric coated 81 milliGRAM(s) Oral daily  atorvastatin 80 milliGRAM(s) Oral at bedtime  carvedilol 12.5 milliGRAM(s) Oral every 12 hours  chlorhexidine 4% Liquid 1 Application(s) Topical once  clopidogrel Tablet 75 milliGRAM(s) Oral daily  dextrose 5%. 1000 milliLiter(s) (50 mL/Hr) IV Continuous <Continuous>  dextrose 5%. 1000 milliLiter(s) (100 mL/Hr) IV Continuous <Continuous>  dextrose 50% Injectable 25 Gram(s) IV Push once  dextrose 50% Injectable 12.5 Gram(s) IV Push once  dextrose 50% Injectable 25 Gram(s) IV Push once  famotidine    Tablet 10 milliGRAM(s) Oral daily  folic acid 1 milliGRAM(s) Oral daily  glucagon  Injectable 1 milliGRAM(s) IntraMuscular once  heparin   Injectable 5000 Unit(s) SubCutaneous every 12 hours  insulin lispro (ADMELOG) corrective regimen sliding scale   SubCutaneous at bedtime  insulin lispro (ADMELOG) corrective regimen sliding scale   SubCutaneous three times a day before meals  lidocaine 2% Gel 1 Application(s) Topical <User Schedule>  melatonin 3 milliGRAM(s) Oral at bedtime  piperacillin/tazobactam IVPB.. 3.375 Gram(s) IV Intermittent every 12 hours  pregabalin 75 milliGRAM(s) Oral at bedtime  senna 2 Tablet(s) Oral at bedtime  sevelamer carbonate 800 milliGRAM(s) Oral three times a day with meals  tamsulosin 0.4 milliGRAM(s) Oral at bedtime  valsartan 40 milliGRAM(s) Oral daily    MEDICATIONS  (PRN):  acetaminophen     Tablet .. 650 milliGRAM(s) Oral every 6 hours PRN Temp greater or equal to 38C (100.4F), Moderate Pain (4 - 6)  dextrose Oral Gel 15 Gram(s) Oral once PRN Blood Glucose LESS THAN 70 milliGRAM(s)/deciliter      Vital Signs Last 24 Hrs  T(C): 36.7 (18 Mar 2023 09:10), Max: 37.2 (18 Mar 2023 05:15)  T(F): 98 (18 Mar 2023 09:10), Max: 98.9 (18 Mar 2023 05:15)  HR: 79 (18 Mar 2023 09:10) (76 - 91)  BP: 131/73 (18 Mar 2023 09:10) (100/60 - 148/89)  BP(mean): --  RR: 18 (18 Mar 2023 09:10) (18 - 20)  SpO2: 98% (18 Mar 2023 09:10) (96% - 100%)    Parameters below as of 18 Mar 2023 09:10  Patient On (Oxygen Delivery Method): room air      CAPILLARY BLOOD GLUCOSE      POCT Blood Glucose.: 264 mg/dL (18 Mar 2023 12:34)  POCT Blood Glucose.: 258 mg/dL (18 Mar 2023 07:28)  POCT Blood Glucose.: 168 mg/dL (17 Mar 2023 23:29)  POCT Blood Glucose.: 240 mg/dL (17 Mar 2023 21:12)  POCT Blood Glucose.: 322 mg/dL (17 Mar 2023 17:31)    I&O's Summary    17 Mar 2023 07:01  -  18 Mar 2023 07:00  --------------------------------------------------------  IN: 0 mL / OUT: 2200 mL / NET: -2200 mL          Appearance: Normal	  HEENT:   Normal oral mucosa, PERRL, EOMI	  Lymphatic: No lymphadenopathy  Cardiovascular: Normal S1 S2, No JVD  Respiratory: Lungs clear to auscultation	  Psychiatry: A & O x 3, Mood & affect appropriate  Gastrointestinal:  Soft, Non-tender, + BS	  Skin: No rash, No ecchymoses	  Extremities: Normal range of motion  Vascular: Peripheral pulses palpable bilaterally    LABS:                        9.0    9.83  )-----------( 208      ( 17 Mar 2023 22:23 )             27.3     03-18    133<L>  |  93<L>  |  35<H>  ----------------------------<  211<H>  4.2   |  24  |  5.35<H>    Ca    8.6      18 Mar 2023 07:16  Phos  4.0     03-18  Mg     2.2     03-17    TPro  6.0  /  Alb  3.5  /  TBili  0.3  /  DBili  x   /  AST  8<L>  /  ALT  <5<L>  /  AlkPhos  99  03-17                    Thyroid Stimulating Hormone, Serum: 2.03 uIU/mL (12-29 @ 06:43)          Consultant(s) Notes Reviewed:      Care Discussed with Consultants/Other Providers:    
[facility] [phone]        Patient seen and examined in bed.  He was afebrile         MEDICATIONS  (STANDING):  allopurinol 100 milliGRAM(s) Oral at bedtime  ascorbic acid 500 milliGRAM(s) Oral daily  aspirin enteric coated 81 milliGRAM(s) Oral daily  atorvastatin 80 milliGRAM(s) Oral at bedtime  carvedilol 12.5 milliGRAM(s) Oral every 12 hours  chlorhexidine 4% Liquid 1 Application(s) Topical once  clopidogrel Tablet 75 milliGRAM(s) Oral daily  dextrose 5%. 1000 milliLiter(s) (50 mL/Hr) IV Continuous <Continuous>  dextrose 5%. 1000 milliLiter(s) (100 mL/Hr) IV Continuous <Continuous>  dextrose 50% Injectable 25 Gram(s) IV Push once  dextrose 50% Injectable 12.5 Gram(s) IV Push once  dextrose 50% Injectable 25 Gram(s) IV Push once  famotidine    Tablet 10 milliGRAM(s) Oral daily  folic acid 1 milliGRAM(s) Oral daily  glucagon  Injectable 1 milliGRAM(s) IntraMuscular once  heparin   Injectable 5000 Unit(s) SubCutaneous every 12 hours  insulin lispro (ADMELOG) corrective regimen sliding scale   SubCutaneous three times a day before meals  insulin lispro (ADMELOG) corrective regimen sliding scale   SubCutaneous at bedtime  lidocaine 2% Gel 1 Application(s) Topical <User Schedule>  melatonin 3 milliGRAM(s) Oral at bedtime  piperacillin/tazobactam IVPB.. 3.375 Gram(s) IV Intermittent every 12 hours  pregabalin 75 milliGRAM(s) Oral at bedtime  senna 2 Tablet(s) Oral at bedtime  sevelamer carbonate 800 milliGRAM(s) Oral three times a day with meals  tamsulosin 0.4 milliGRAM(s) Oral at bedtime  valsartan 40 milliGRAM(s) Oral daily      VITAL:  T(C): , Max: 37.3 (03-16-23 @ 21:02)  T(F): , Max: 99.1 (03-16-23 @ 21:02)  HR: 73 (03-17-23 @ 05:15)  BP: 141/73 (03-17-23 @ 05:15)  BP(mean): --  RR: 18 (03-17-23 @ 05:15)  SpO2: 98% (03-17-23 @ 05:15)  Wt(kg): --    I and O's:        PHYSICAL EXAM:    Constitutional: NAD  Neck:  No JVD  Respiratory: CTAB/L  Cardiovascular: S1 and S2  Gastrointestinal: BS+, soft, NT/ND  Extremities: No peripheral edema  Neurological: A/O x 3, no focal deficits  Psychiatric: Normal mood, normal affect  : No Jay  Skin: No rashes  Access: avf     LABS:                Urine Studies:          RADIOLOGY & ADDITIONAL STUDIES:            
    afberile  REVIEW OF SYSTEMS:  CONSTITUTIONAL: No fever,  no  weight loss  ENT:  No  tinnitus,   no   vertigo  NECK: No pain or stiffness  RESPIRATORY: No cough, wheezing, chills or hemoptysis;    No Shortness of Breath  CARDIOVASCULAR: No chest pain, palpitations, dizziness  GASTROINTESTINAL: No abdominal or epigastric pain. No nausea, vomiting, or hematemesis; No diarrhea  No melena or hematochezia.  GENITOURINARY: No dysuria, frequency, hematuria, or incontinence  NEUROLOGICAL: No headaches  SKIN: No itching,  no   rash  LYMPH Nodes: No enlarged glands  ENDOCRINE: No heat or cold intolerance  MUSCULOSKELETAL: No joint pain or swelling  PSYCHIATRIC: No depression, anxiety  HEME/LYMPH: No easy bruising, or bleeding gums  ALLERGY AND IMMUNOLOGIC: No hives or eczema	    MEDICATIONS  (STANDING):  allopurinol 100 milliGRAM(s) Oral at bedtime  ascorbic acid 500 milliGRAM(s) Oral daily  aspirin enteric coated 81 milliGRAM(s) Oral daily  atorvastatin 80 milliGRAM(s) Oral at bedtime  carvedilol 12.5 milliGRAM(s) Oral every 12 hours  cephalexin 500 milliGRAM(s) Oral daily  chlorhexidine 4% Liquid 1 Application(s) Topical once  clopidogrel Tablet 75 milliGRAM(s) Oral daily  dextrose 5%. 1000 milliLiter(s) (50 mL/Hr) IV Continuous <Continuous>  dextrose 5%. 1000 milliLiter(s) (100 mL/Hr) IV Continuous <Continuous>  dextrose 50% Injectable 25 Gram(s) IV Push once  dextrose 50% Injectable 12.5 Gram(s) IV Push once  dextrose 50% Injectable 25 Gram(s) IV Push once  famotidine    Tablet 10 milliGRAM(s) Oral daily  folic acid 1 milliGRAM(s) Oral daily  glucagon  Injectable 1 milliGRAM(s) IntraMuscular once  heparin   Injectable 5000 Unit(s) SubCutaneous every 12 hours  insulin lispro (ADMELOG) corrective regimen sliding scale   SubCutaneous three times a day before meals  insulin lispro (ADMELOG) corrective regimen sliding scale   SubCutaneous at bedtime  lidocaine 2% Gel 1 Application(s) Topical <User Schedule>  lidocaine 4% Cream 1 Application(s) Topical once  melatonin 3 milliGRAM(s) Oral at bedtime  pregabalin 75 milliGRAM(s) Oral at bedtime  senna 2 Tablet(s) Oral at bedtime  sevelamer carbonate 800 milliGRAM(s) Oral three times a day with meals  tamsulosin 0.4 milliGRAM(s) Oral at bedtime  valsartan 40 milliGRAM(s) Oral daily    MEDICATIONS  (PRN):  acetaminophen     Tablet .. 650 milliGRAM(s) Oral every 6 hours PRN Temp greater or equal to 38C (100.4F), Moderate Pain (4 - 6)  dextrose Oral Gel 15 Gram(s) Oral once PRN Blood Glucose LESS THAN 70 milliGRAM(s)/deciliter      Vital Signs Last 24 Hrs  T(C): 37.2 (20 Mar 2023 05:19), Max: 37.2 (20 Mar 2023 05:19)  T(F): 99 (20 Mar 2023 05:19), Max: 99 (20 Mar 2023 05:19)  HR: 85 (20 Mar 2023 05:19) (76 - 85)  BP: 160/79 (20 Mar 2023 05:19) (155/71 - 167/79)  BP(mean): --  RR: 18 (20 Mar 2023 05:19) (18 - 18)  SpO2: 96% (20 Mar 2023 05:19) (96% - 99%)    Parameters below as of 20 Mar 2023 05:19  Patient On (Oxygen Delivery Method): room air      CAPILLARY BLOOD GLUCOSE      POCT Blood Glucose.: 220 mg/dL (20 Mar 2023 08:35)  POCT Blood Glucose.: 254 mg/dL (19 Mar 2023 21:20)  POCT Blood Glucose.: 246 mg/dL (19 Mar 2023 17:18)  POCT Blood Glucose.: 221 mg/dL (19 Mar 2023 12:47)    I&O's Summary        Appearance: Normal	  HEENT:   Normal oral mucosa, PERRL, EOMI	  Lymphatic: No lymphadenopathy  Cardiovascular: Normal S1 S2, No JVD  Respiratory: Lungs clear to auscultation	  Psychiatry: A & O x 3, Mood & affect appropriate  Gastrointestinal:  Soft, Non-tender, + BS	  Skin: No rash, No ecchymoses	  Extremities: Normal range of motion  Vascular: Peripheral pulses palpable bilaterally    LABS:                          Thyroid Stimulating Hormone, Serum: 2.03 uIU/mL (12-29 @ 06:43)          Consultant(s) Notes Reviewed:      Care Discussed with Consultants/Other Providers:    
  afberile    REVIEW OF SYSTEMS:  CONSTITUTIONAL: No fever,  no  weight loss  ENT:  No  tinnitus,   no   vertigo  NECK: No pain or stiffness  RESPIRATORY: No cough, wheezing, chills or hemoptysis;    No Shortness of Breath  CARDIOVASCULAR: No chest pain, palpitations, dizziness  GASTROINTESTINAL: No abdominal or epigastric pain. No nausea, vomiting, or hematemesis; No diarrhea  No melena or hematochezia.  GENITOURINARY: No dysuria, frequency, hematuria, or incontinence  NEUROLOGICAL: No headaches  SKIN: No itching,  no   rash  LYMPH Nodes: No enlarged glands  ENDOCRINE: No heat or cold intolerance  MUSCULOSKELETAL: No joint pain or swelling  PSYCHIATRIC: No depression, anxiety  HEME/LYMPH: No easy bruising, or bleeding gums  ALLERGY AND IMMUNOLOGIC: No hives or eczema	    MEDICATIONS  (STANDING):  allopurinol 100 milliGRAM(s) Oral at bedtime  ascorbic acid 500 milliGRAM(s) Oral daily  aspirin enteric coated 81 milliGRAM(s) Oral daily  atorvastatin 80 milliGRAM(s) Oral at bedtime  carvedilol 12.5 milliGRAM(s) Oral every 12 hours  cephalexin 500 milliGRAM(s) Oral daily  chlorhexidine 4% Liquid 1 Application(s) Topical once  clopidogrel Tablet 75 milliGRAM(s) Oral daily  dextrose 5%. 1000 milliLiter(s) (50 mL/Hr) IV Continuous <Continuous>  dextrose 5%. 1000 milliLiter(s) (100 mL/Hr) IV Continuous <Continuous>  dextrose 50% Injectable 25 Gram(s) IV Push once  dextrose 50% Injectable 12.5 Gram(s) IV Push once  dextrose 50% Injectable 25 Gram(s) IV Push once  famotidine    Tablet 10 milliGRAM(s) Oral daily  folic acid 1 milliGRAM(s) Oral daily  glucagon  Injectable 1 milliGRAM(s) IntraMuscular once  heparin   Injectable 5000 Unit(s) SubCutaneous every 12 hours  insulin lispro (ADMELOG) corrective regimen sliding scale   SubCutaneous three times a day before meals  insulin lispro (ADMELOG) corrective regimen sliding scale   SubCutaneous at bedtime  lidocaine 2% Gel 1 Application(s) Topical <User Schedule>  melatonin 3 milliGRAM(s) Oral at bedtime  pregabalin 75 milliGRAM(s) Oral at bedtime  senna 2 Tablet(s) Oral at bedtime  sevelamer carbonate 800 milliGRAM(s) Oral three times a day with meals  tamsulosin 0.4 milliGRAM(s) Oral at bedtime  valsartan 40 milliGRAM(s) Oral daily    MEDICATIONS  (PRN):  acetaminophen     Tablet .. 650 milliGRAM(s) Oral every 6 hours PRN Temp greater or equal to 38C (100.4F), Moderate Pain (4 - 6)  dextrose Oral Gel 15 Gram(s) Oral once PRN Blood Glucose LESS THAN 70 milliGRAM(s)/deciliter      Vital Signs Last 24 Hrs  T(C): 36.8 (19 Mar 2023 05:37), Max: 37.1 (18 Mar 2023 17:58)  T(F): 98.3 (19 Mar 2023 05:37), Max: 98.8 (18 Mar 2023 17:58)  HR: 85 (19 Mar 2023 05:37) (81 - 85)  BP: 143/73 (19 Mar 2023 05:37) (143/73 - 168/84)  BP(mean): 105 (18 Mar 2023 21:00) (105 - 105)  RR: 18 (19 Mar 2023 05:37) (18 - 18)  SpO2: 97% (19 Mar 2023 05:37) (97% - 99%)    Parameters below as of 19 Mar 2023 05:37  Patient On (Oxygen Delivery Method): room air      CAPILLARY BLOOD GLUCOSE      POCT Blood Glucose.: 231 mg/dL (19 Mar 2023 08:31)  POCT Blood Glucose.: 216 mg/dL (18 Mar 2023 21:23)  POCT Blood Glucose.: 146 mg/dL (18 Mar 2023 17:04)  POCT Blood Glucose.: 264 mg/dL (18 Mar 2023 12:34)    I&O's Summary        Appearance: Normal	  HEENT:   Normal oral mucosa, PERRL, EOMI	  Lymphatic: No lymphadenopathy  Cardiovascular: Normal S1 S2, No JVD  Respiratory: Lungs clear to auscultation	  Psychiatry: A & O x 3, Mood & affect appropriate  Gastrointestinal:  Soft, Non-tender, + BS	  Skin: No rash, No ecchymoses	  Extremities: Normal range of motion  Vascular: Peripheral pulses palpable bilaterally    LABS:                        9.0    9.83  )-----------( 208      ( 17 Mar 2023 22:23 )             27.3     03-18    133<L>  |  93<L>  |  35<H>  ----------------------------<  211<H>  4.2   |  24  |  5.35<H>    Ca    8.6      18 Mar 2023 07:16  Phos  4.0     03-18  Mg     2.2     03-17    TPro  6.0  /  Alb  3.5  /  TBili  0.3  /  DBili  x   /  AST  8<L>  /  ALT  <5<L>  /  AlkPhos  99  03-17                    Thyroid Stimulating Hormone, Serum: 2.03 uIU/mL (12-29 @ 06:43)          Consultant(s) Notes Reviewed:      Care Discussed with Consultants/Other Providers:    
  afberile    REVIEW OF SYSTEMS:  CONSTITUTIONAL: No fever,  no  weight loss  ENT:  No  tinnitus,   no   vertigo  NECK: No pain or stiffness  RESPIRATORY: No cough, wheezing, chills or hemoptysis;    No Shortness of Breath  CARDIOVASCULAR: No chest pain, palpitations, dizziness  GASTROINTESTINAL: No abdominal or epigastric pain. No nausea, vomiting, or hematemesis; No diarrhea  No melena or hematochezia.  GENITOURINARY: No dysuria, frequency, hematuria, or incontinence  NEUROLOGICAL: No headaches  SKIN: No itching,  no   rash  LYMPH Nodes: No enlarged glands  ENDOCRINE: No heat or cold intolerance  MUSCULOSKELETAL: No joint pain or swelling  PSYCHIATRIC: No depression, anxiety  HEME/LYMPH: No easy bruising, or bleeding gums  ALLERGY AND IMMUNOLOGIC: No hives or eczema	    MEDICATIONS  (STANDING):  allopurinol 100 milliGRAM(s) Oral at bedtime  ascorbic acid 500 milliGRAM(s) Oral daily  aspirin enteric coated 81 milliGRAM(s) Oral daily  atorvastatin 80 milliGRAM(s) Oral at bedtime  carvedilol 12.5 milliGRAM(s) Oral every 12 hours  chlorhexidine 4% Liquid 1 Application(s) Topical once  clopidogrel Tablet 75 milliGRAM(s) Oral daily  dextrose 5%. 1000 milliLiter(s) (50 mL/Hr) IV Continuous <Continuous>  dextrose 5%. 1000 milliLiter(s) (100 mL/Hr) IV Continuous <Continuous>  dextrose 50% Injectable 25 Gram(s) IV Push once  dextrose 50% Injectable 12.5 Gram(s) IV Push once  dextrose 50% Injectable 25 Gram(s) IV Push once  famotidine    Tablet 10 milliGRAM(s) Oral daily  folic acid 1 milliGRAM(s) Oral daily  glucagon  Injectable 1 milliGRAM(s) IntraMuscular once  heparin   Injectable 5000 Unit(s) SubCutaneous every 12 hours  insulin lispro (ADMELOG) corrective regimen sliding scale   SubCutaneous three times a day before meals  insulin lispro (ADMELOG) corrective regimen sliding scale   SubCutaneous at bedtime  lidocaine 2% Gel 1 Application(s) Topical <User Schedule>  melatonin 3 milliGRAM(s) Oral at bedtime  piperacillin/tazobactam IVPB.. 3.375 Gram(s) IV Intermittent every 12 hours  pregabalin 75 milliGRAM(s) Oral at bedtime  senna 2 Tablet(s) Oral at bedtime  sevelamer carbonate 800 milliGRAM(s) Oral three times a day with meals  tamsulosin 0.4 milliGRAM(s) Oral at bedtime  valsartan 40 milliGRAM(s) Oral daily    MEDICATIONS  (PRN):  acetaminophen     Tablet .. 650 milliGRAM(s) Oral every 6 hours PRN Temp greater or equal to 38C (100.4F), Moderate Pain (4 - 6)  dextrose Oral Gel 15 Gram(s) Oral once PRN Blood Glucose LESS THAN 70 milliGRAM(s)/deciliter      Vital Signs Last 24 Hrs  T(C): 37.1 (17 Mar 2023 05:15), Max: 37.3 (16 Mar 2023 21:02)  T(F): 98.8 (17 Mar 2023 05:15), Max: 99.1 (16 Mar 2023 21:02)  HR: 73 (17 Mar 2023 05:15) (63 - 76)  BP: 141/73 (17 Mar 2023 05:15) (109/62 - 148/79)  BP(mean): --  RR: 18 (17 Mar 2023 05:15) (18 - 18)  SpO2: 98% (17 Mar 2023 05:15) (94% - 98%)    Parameters below as of 17 Mar 2023 05:15  Patient On (Oxygen Delivery Method): room air      CAPILLARY BLOOD GLUCOSE      POCT Blood Glucose.: 284 mg/dL (17 Mar 2023 08:10)  POCT Blood Glucose.: 250 mg/dL (16 Mar 2023 22:30)  POCT Blood Glucose.: 360 mg/dL (16 Mar 2023 18:21)  POCT Blood Glucose.: 334 mg/dL (16 Mar 2023 12:27)    I&O's Summary        Appearance: Normal	  HEENT:   Normal oral mucosa, PERRL, EOMI	  Lymphatic: No lymphadenopathy  Cardiovascular: Normal S1 S2, No JVD  Respiratory: Lungs clear to auscultation	  Psychiatry: A & O x 3, Mood & affect appropriate  Gastrointestinal:  Soft, Non-tender, + BS	  Skin: No rash, No ecchymoses	    LABS:                          Thyroid Stimulating Hormone, Serum: 2.03 uIU/mL (12-29 @ 06:43)          Consultant(s) Notes Reviewed:      Care Discussed with Consultants/Other Providers:    
[facility] [phone]        Patient seen and examined in bed.  He was the same   No complaints         MEDICATIONS  (STANDING):  allopurinol 100 milliGRAM(s) Oral at bedtime  ascorbic acid 500 milliGRAM(s) Oral daily  aspirin enteric coated 81 milliGRAM(s) Oral daily  atorvastatin 80 milliGRAM(s) Oral at bedtime  carvedilol 12.5 milliGRAM(s) Oral every 12 hours  cephalexin 500 milliGRAM(s) Oral daily  chlorhexidine 4% Liquid 1 Application(s) Topical once  clopidogrel Tablet 75 milliGRAM(s) Oral daily  dextrose 5%. 1000 milliLiter(s) (50 mL/Hr) IV Continuous <Continuous>  dextrose 5%. 1000 milliLiter(s) (100 mL/Hr) IV Continuous <Continuous>  dextrose 50% Injectable 25 Gram(s) IV Push once  dextrose 50% Injectable 12.5 Gram(s) IV Push once  dextrose 50% Injectable 25 Gram(s) IV Push once  famotidine    Tablet 10 milliGRAM(s) Oral daily  folic acid 1 milliGRAM(s) Oral daily  glucagon  Injectable 1 milliGRAM(s) IntraMuscular once  heparin   Injectable 5000 Unit(s) SubCutaneous every 12 hours  insulin lispro (ADMELOG) corrective regimen sliding scale   SubCutaneous three times a day before meals  insulin lispro (ADMELOG) corrective regimen sliding scale   SubCutaneous at bedtime  lidocaine 2% Gel 1 Application(s) Topical <User Schedule>  lidocaine 4% Cream 1 Application(s) Topical once  melatonin 3 milliGRAM(s) Oral at bedtime  pregabalin 75 milliGRAM(s) Oral at bedtime  senna 2 Tablet(s) Oral at bedtime  sevelamer carbonate 800 milliGRAM(s) Oral three times a day with meals  tamsulosin 0.4 milliGRAM(s) Oral at bedtime  valsartan 40 milliGRAM(s) Oral daily      VITAL:  T(C): , Max: 37.2 (03-20-23 @ 05:19)  T(F): , Max: 99 (03-20-23 @ 05:19)  HR: 85 (03-20-23 @ 05:19)  BP: 160/79 (03-20-23 @ 05:19)  BP(mean): --  RR: 18 (03-20-23 @ 05:19)  SpO2: 96% (03-20-23 @ 05:19)  Wt(kg): --    I and O's:        PHYSICAL EXAM:    Constitutional: NAD  Neck:  No JVD  Respiratory: CTAB/L  Cardiovascular: S1 and S2  Gastrointestinal: BS+, soft, NT/ND  Extremities: No peripheral edema  Neurological: A/O x 3, no focal deficits  Psychiatric: Normal mood, normal affect  : No Jay  Skin: No rashes  Access: avf    LABS:                Urine Studies:          RADIOLOGY & ADDITIONAL STUDIES:

## 2023-03-20 NOTE — PROGRESS NOTE ADULT - REASON FOR ADMISSION
pvd/ sepsis

## 2023-03-20 NOTE — PROGRESS NOTE ADULT - PROVIDER SPECIALTY LIST ADULT
Cardiology
Cardiology
Internal Medicine
Cardiology
Internal Medicine
Internal Medicine
Nephrology
Nephrology
Cardiology
Internal Medicine
Nephrology
Nephrology
Podiatry

## 2023-03-22 ENCOUNTER — APPOINTMENT (OUTPATIENT)
Dept: VASCULAR SURGERY | Facility: CLINIC | Age: 63
End: 2023-03-22
Payer: COMMERCIAL

## 2023-03-22 ENCOUNTER — APPOINTMENT (OUTPATIENT)
Dept: WOUND CARE | Facility: CLINIC | Age: 63
End: 2023-03-22
Payer: COMMERCIAL

## 2023-03-22 VITALS — TEMPERATURE: 98 F | HEART RATE: 80 BPM | DIASTOLIC BLOOD PRESSURE: 69 MMHG | SYSTOLIC BLOOD PRESSURE: 121 MMHG

## 2023-03-22 DIAGNOSIS — E08.3593 DIABETES MELLITUS DUE TO UNDERLYING CONDITION WITH PROLIFERATIVE DIABETIC RETINOPATHY WITHOUT MACULAR EDEMA, BILATERAL: ICD-10-CM

## 2023-03-22 PROCEDURE — 93923 UPR/LXTR ART STDY 3+ LVLS: CPT

## 2023-03-22 PROCEDURE — 99213 OFFICE O/P EST LOW 20 MIN: CPT

## 2023-03-22 PROCEDURE — 99203 OFFICE O/P NEW LOW 30 MIN: CPT

## 2023-03-22 NOTE — REVIEW OF SYSTEMS
[Recent Weight Loss (___ Lbs)] : recent [unfilled] ~Ulb weight loss [Joint Swelling] : joint swelling [Joint Stiffness] : joint stiffness [Skin Wound] : skin wound [As Noted in HPI] : as noted in HPI [Negative] : Psychiatric [FreeTextEntry2] : started 10/22 dialysis [FreeTextEntry3] : vision issues secondary to diabetes [FreeTextEntry5] : s/p multiple coronary stents [FreeTextEntry8] : dialysis [FreeTextEntry9] : ROULA [de-identified] : poorly controlled in past

## 2023-03-22 NOTE — PHYSICAL EXAM
[Skin Ulcer] : ulcer [Alert] : alert [Oriented to Person] : oriented to person [Oriented to Place] : oriented to place [Oriented to Time] : oriented to time [Calm] : calm [Please See PDF for Tissue Analytics] : Please See PDF for [employer] Analytics. [Ankle Swelling (On Exam)] : not present [de-identified] : grimacing in pain, well groomed male

## 2023-03-22 NOTE — PLAN
[FreeTextEntry1] : 3/8/23\par Plan - culture obtained\par santyl renewed to pharmacy\par tramadol dose adjusted to pharmacy for pain\par shower wound, santyl/gauze/foam/john, offload area\par supplies ordered\par follow up 1 week\par follow up vascular surgery regarding LLE\par Patient scheduled for vascular testing today\par patient not wearing offloading shoe as per family\par Offloading recommended\par patient scheduled for vascular testing.

## 2023-03-22 NOTE — ASSESSMENT
[FreeTextEntry1] : 03/22/2023\par Left heel pressure ulcer\par recent hospitalization with sepsis\par patient has been using santyl\par Patient will be [facility] now for Vascular studies and [facility]\par importance of offloading given\par follow up 1 week\par follow up vascular surgery regarding LLE\par HBO discussed

## 2023-03-23 NOTE — HISTORY OF PRESENT ILLNESS
[FreeTextEntry1] : 62 year old male with history of claudication with left heel ulcer with overlying eschar. The wound has been present since January and he is undergoing wound care evaluation and management by Dr. Araujo. He is currently in a wheelchair as he has been very weak since his recent hospital discharge. He denies any rest pain.

## 2023-03-23 NOTE — ASSESSMENT
[FreeTextEntry1] : Problem #1 peripheral arterial disease, [place] 5\par - left heel ulcer\par - based on non invasive studies should have satisfactory wound healing potential\par - continue local wound care as per Dr. Araujo\par - follow up for PAD surveillance in 3 months

## 2023-03-23 NOTE — REASON FOR VISIT
[Initial Evaluation] : an initial evaluation [Family Member] : family member [FreeTextEntry1] : evaluation for peripheral arterial disease

## 2023-03-23 NOTE — END OF VISIT
CHIEF COMPLAINT:  Katarina Franco is here today for Subsequent Annual Medicare Wellness Visit.      Medication verified and med list updated    Refills needed today?  No          Patient would like communication of their results via:   Home Phone: [phone] (home)  Okay to leave a message containing results? Yes     CHOLESTEROL (mg/dL)   Date Value   06/12/2019 142     HDL (mg/dL)   Date Value   06/12/2019 51     No components found for: CHOLHDLRATIO  TRIGLYCERIDE (mg/dL)   Date Value   06/12/2019 112     CALCULATED LDL (mg/dL)   Date Value   06/12/2019 69      Glucose (mg/dL)   Date Value   06/12/2019 100 (H)       Health Maintenance Due   Topic Date Due   • Shingles Vaccine (1 of 2) 08/31/1991   • Medicare Wellness 65+  07/10/2019       Patient is due for topics as listed above but is not proceeding with Immunization(s) Shingles at this time due to lack of inventory.     [Time Spent: ___ minutes] : I have spent [unfilled] minutes of time on the encounter.

## 2023-03-23 NOTE — PHYSICAL EXAM
[Respiratory Effort] : normal respiratory effort [Normal Rate and Rhythm] : normal rate and rhythm [2+] : left 2+ [0] : left 0 [Ankle Swelling (On Exam)] : present [Ankle Swelling Bilaterally] : bilaterally  [Ankle Swelling On The Right] : mild [Varicose Veins Of Lower Extremities] : not present [] : not present [Abdomen Tenderness] : ~T ~M No abdominal tenderness [Skin Ulcer] : ulcer [Alert] : alert [Oriented to Person] : oriented to person [Oriented to Place] : oriented to place [Oriented to Time] : oriented to time [Calm] : calm [de-identified] : appears stated age [de-identified] : normocephalic, atraumatic [de-identified] : supple

## 2023-03-26 NOTE — ED CDU PROVIDER INITIAL DAY NOTE - ATTENDING CONTRIBUTION TO CARE
4 = No assist / stand by assistance Pt was seen and evaluated by me. Pt was 57 y/o male with PMHx of CAD s/p stent, CKD, DM, HLD, HTN, GERD, and Gout presents to ED for b/l ankle pain X 1 month. Pt states over the past month having increased pain to b/l ankles since running out of his Colchicine. Pt notes having similar pain 2 months ago when he was given colchicine with improvement. Pt denies any falls or trauma to the area. Pt admits to a fever of 102 yesterday as well as some redness to his left over the past week. Pt denies any nausea, vomiting, SOB, chest pain, or abd pain. Pt was started on Ceftriaxone for the suspected cellulitis and sent to [facility] for IV antibiotics and pain control. Lungs CTA b/l. RRR. Abd soft, non-tender. Noted to have swelling and erythema to dorsum of left hand. Pt has a chronic swelling to left 3rd finger. + distal pulses. No swelling noted to b/l ankles. No erythema or warmth. FROM at ankles b/l. Noted to have an old ulcer to sole of right foot, no surrounding erythema. + distal pulses.

## 2023-03-28 NOTE — PATIENT PROFILE ADULT - OVER THE PAST TWO WEEKS HAVE YOU FELT DOWN, DEPRESSED OR HOPELESS?
no rashes , no suspicious lesions , no areas of discoloration , No overt jaundice to the skin., good turgor , no masses , no tenderness on palpation no

## 2023-03-29 ENCOUNTER — APPOINTMENT (OUTPATIENT)
Dept: ENDOCRINOLOGY | Facility: CLINIC | Age: 63
End: 2023-03-29
Payer: COMMERCIAL

## 2023-03-29 ENCOUNTER — RESULT CHARGE (OUTPATIENT)
Age: 63
End: 2023-03-29

## 2023-03-29 VITALS
HEART RATE: 84 BPM | HEIGHT: 67 IN | DIASTOLIC BLOOD PRESSURE: 60 MMHG | SYSTOLIC BLOOD PRESSURE: 110 MMHG | BODY MASS INDEX: 26.68 KG/M2 | OXYGEN SATURATION: 97 % | WEIGHT: 170 LBS

## 2023-03-29 LAB — HBA1C MFR BLD HPLC: 7.2

## 2023-03-29 PROCEDURE — 99214 OFFICE O/P EST MOD 30 MIN: CPT

## 2023-03-29 PROCEDURE — 83036 HEMOGLOBIN GLYCOSYLATED A1C: CPT | Mod: QW

## 2023-03-29 NOTE — PHYSICAL EXAM
[Alert] : alert [Well Nourished] : well nourished [Obese] : obese [No Acute Distress] : no acute distress [Well Developed] : well developed [Normal Oropharynx] : the oropharynx was normal [No Respiratory Distress] : no respiratory distress [No Accessory Muscle Use] : no accessory muscle use [Clear to Auscultation] : lungs were clear to auscultation bilaterally [Normal Rate] : heart rate was normal [Regular Rhythm] : with a regular rhythm [Oriented x3] : oriented to person, place, and time [Normal S1, S2] : normal S1 and S2 [de-identified] : Following with wound care.

## 2023-03-29 NOTE — HISTORY OF PRESENT ILLNESS
[FreeTextEntry1] : Patient is a 62-year-old man with history of type 2 diabetes, hypertension, CKD, CAD status post cardiac stents, hypertension, hyperlipidemia, here for endocrinology follow-up visit.\par \par Significant interval history since the last visit as follows:\par INTERVAL HISTORY:\par Hospitalized originally october 12, 2022, [facility], for high glucose. Discharged 10/20, readmitted to [facility] on October 22nd for infection without known cause. \par October 26th- started hemodialyisis, every monday Wednesday Friday. \par Had two stents placed November of 2022 LCA, RCA. 11/2, 11/3\par November 9th- diagnosed with COVID. \par November: admitted for rehab at [facility]. Discharged december 5th at [facility]. \par 12/7/22: started dialysis with [facility]. \par Fell off the stairs 12/10/22. \par 12/12/22: admitted again to [facility] with subdural hematoma. \par 1/31: admitted to [facility] for acute rehab, d/c to [facility] again, discharged from [facility] on 2/15. \par **Has a big pressure ulcer on the left heel. \par Had an angiogram and balloon angioplasty. left lower extremity. \par 3/14/22: admitted again to [facility] for sepsis. \par got IV antibiotics, was discharged on 3/20/23, and has been home since. \par Will be going for more vascular testing. \par \par Regarding type 2 diabetes, diagnosed more than 20+ years, complicated by diabetic retinopathy, neuropathy, and nephropathy, now on hemodialysis. \par Diabetes also complicated by CAD status post cardiac stents. He has a CardioMEMS. No no history of CHF or CVA in the past. He follows up with Dr. Jonathan Torres from [facility].\par \par A1c 3/29/23: 7.2%\par \par Current Diabetes Medications:\par Toujeo 15 units daily. Recently when its 300 something takes 40-50 units. \par Not taking any mealtime insulin. \par \par Previous Medications:\par Humalog 18 units 3 times daily with meals plus moderate dose sliding scale 2: 50 above 150 mg/dL\par Trulicity 0.75 mg once weekly took one dose. Appetite is not good with dialysis. \par \par Homer sensor: \par Limited data. \par He does run quite high into the 200-300s after eating. Again, the data is limited. \par \par Diet:\par Appetite is lower after being on dialysis. \par \par Regarding hypertension, currently managed by his new nephrologist. \par He is on dialysis. \par Regarding hyperlipidemia patient is on statin therapy with atorvastatin 80 mg once daily as well as fenofibrate 48 mg once daily.

## 2023-03-29 NOTE — ASSESSMENT
[FreeTextEntry1] : Mr. ADAM KOWALSKI is a 62 year yo M here for evaluation and treatment of diabetes mellitus type II, poorly controlled with multiple microvascular and macrovascular complication. \par \par Patient had to rush to leave the visit. The visit was at 1:30, and the wife needed to  family member at 2:20. so, we did a targeted focused glucose management plan and he will return to care with Dr. Hernandez as scheduled. \par \par 1. Type 2 diabetes mellitus\par A1c poorly controlled, has Homer 2 sensor but not scanning regularly. I tried for Homer 3, but will not work with phone. Instead I linked him to our practice and he will use Homer. Challenging to interpret the homer because he will occasionally take either 15 or 40 units \par \par A1c 12.9% \par A1c today 3/29/23: 7.2%, will order fructosamine on labs, unclear whether this is accurate. \par \par Plan:\par Toujeo 15 units at bedtime consistently. \par Restart Humalog 5 units pre-meal, before eating. \par Check in on glucose readings in 1 week. \par I linked the patient to our practice. \par Scan q 8 hours. \par we will up-titrate based on my review of his glucose, I am being cautious as he is on dialysis and I have very limited data. I discussed with the patient that he is always running high after dinner, and then is treating this with more basal insulin. but it is likely he just needs some mealtime insulin before eating to avoid this spike in the first place. \par \par Hold off on trulicity at this time because his appetite is poor. \par Had clearance from his ophthalmologist.  Triglyceride was in 300s.\par \par Discussed risks of the thyroid C-cell tumor, pancreatitis, hypoglycemia, hypersensitivity reactions, acute kidney injury, severe gastrointestinal complications, discussed that the most common adverse infections included nausea, diarrhea, vomiting, and abdominal pain.  We discussed that gastric emptying slow by GLP-1 receptor agonist.  \par Diabetic retinopathy complications have been reported in a cardiovascular outcomes trial. Should have semi-annual eye exam and close follow up with his/her ophthalmologist.\par \par Has a history of lower extremity wounds and significant peripheral vascular disease. GFR 27 2021. Not a good candidate for SGLT2 inhibitor. \par Continue to follow-up with nephrologist.\par Continue to follow-up ophthalmologist once yearly.  \par \par 2.  Hypertension\par Blood pressure goal <130/80\par Currently at goal 110/60 mmHg\par Following up with nephrology\par Approaching dialysis.  Having fistula creation March 2022.\par \par 3.  Hyperlipidemia\par Patient had high triglyceride level in the past\par We will repeat fasting lipid profile\par Continue with fenofibrate renally dosed\par Continue with atorvastatin 80 mg once daily\par Discussed diet and exercise\par \par Follow up Dr. David Souza as scheduled. \par I ordered labs today to be done with dialysis appointment. \par \par EDUCATION: Reviewed ‘ABCs’ of diabetes management (respective goals in parentheses): A1C (<7), blood pressure (<140/90), and cholesterol (LDL <100).\par \par COMPLIANCE at present is estimated to be: poor. \par FOLLOW UP: I recommend that frequency of visits for diabetes care be every 3 month

## 2023-03-30 ENCOUNTER — TRANSCRIPTION ENCOUNTER (OUTPATIENT)
Age: 63
End: 2023-03-30

## 2023-04-06 ENCOUNTER — APPOINTMENT (OUTPATIENT)
Dept: VASCULAR SURGERY | Facility: CLINIC | Age: 63
End: 2023-04-06

## 2023-04-06 ENCOUNTER — NON-APPOINTMENT (OUTPATIENT)
Age: 63
End: 2023-04-06

## 2023-04-10 ENCOUNTER — APPOINTMENT (OUTPATIENT)
Dept: WOUND CARE | Facility: CLINIC | Age: 63
End: 2023-04-10

## 2023-04-12 ENCOUNTER — APPOINTMENT (OUTPATIENT)
Dept: WOUND CARE | Facility: CLINIC | Age: 63
End: 2023-04-12
Payer: COMMERCIAL

## 2023-04-12 VITALS
WEIGHT: 165 LBS | DIASTOLIC BLOOD PRESSURE: 85 MMHG | BODY MASS INDEX: 25.9 KG/M2 | HEART RATE: 78 BPM | SYSTOLIC BLOOD PRESSURE: 153 MMHG | TEMPERATURE: 97.4 F | HEIGHT: 67 IN | OXYGEN SATURATION: 99 %

## 2023-04-12 DIAGNOSIS — G56.23 LESION OF ULNAR NERVE, BILATERAL UPPER LIMBS: ICD-10-CM

## 2023-04-12 PROCEDURE — 11042 DBRDMT SUBQ TIS 1ST 20SQCM/<: CPT

## 2023-04-13 PROBLEM — G56.23 ULNAR NEUROPATHY OF BOTH UPPER EXTREMITIES: Status: ACTIVE | Noted: 2019-08-26

## 2023-04-13 NOTE — REVIEW OF SYSTEMS
[Recent Weight Loss (___ Lbs)] : recent [unfilled] ~Ulb weight loss [Joint Swelling] : joint swelling [Joint Stiffness] : joint stiffness [Skin Wound] : skin wound [As Noted in HPI] : as noted in HPI [Negative] : Psychiatric [FreeTextEntry2] : started 10/22 dialysis [FreeTextEntry3] : vision issues secondary to diabetes [FreeTextEntry5] : s/p multiple coronary stents [FreeTextEntry8] : dialysis [FreeTextEntry9] : ROULA [de-identified] : poorly controlled in past

## 2023-04-13 NOTE — ASSESSMENT
[FreeTextEntry1] : 03/22/2023\par Left heel pressure ulcer\par recent hospitalization with sepsis\par patient has been using santyl\par Patient will be going [facility] now for Vascular studies and MARGARET\par importance of offloading given\par follow up 1 week\par follow up vascular surgery regarding LLE\par HBO discussed\par \par 4/12/2023-\par Wound straight depth 0.9cm, Probes to bone\par s/p excisional debridement\par HBE recommended\par Patient declined\par Patient declines offloading shoe\par

## 2023-04-13 NOTE — PHYSICAL EXAM
[Skin Ulcer] : ulcer [Alert] : alert [Oriented to Person] : oriented to person [Oriented to Place] : oriented to place [Oriented to Time] : oriented to time [Calm] : calm [Please See PDF for Tissue Analytics] : Please See PDF for [employer] Analytics. [Ankle Swelling (On Exam)] : not present [de-identified] : grimacing in pain, well groomed male

## 2023-04-17 ENCOUNTER — APPOINTMENT (OUTPATIENT)
Dept: WOUND CARE | Facility: CLINIC | Age: 63
End: 2023-04-17
Payer: COMMERCIAL

## 2023-04-17 VITALS
WEIGHT: 165 LBS | DIASTOLIC BLOOD PRESSURE: 63 MMHG | TEMPERATURE: 98.1 F | HEIGHT: 67 IN | BODY MASS INDEX: 25.9 KG/M2 | SYSTOLIC BLOOD PRESSURE: 136 MMHG

## 2023-04-17 PROCEDURE — 99214 OFFICE O/P EST MOD 30 MIN: CPT

## 2023-04-17 NOTE — PHYSICAL EXAM
[Skin Ulcer] : ulcer [Alert] : alert [Oriented to Person] : oriented to person [Oriented to Place] : oriented to place [Oriented to Time] : oriented to time [Calm] : calm [Please See PDF for Tissue Analytics] : Please See PDF for [employer] Analytics. [Ankle Swelling (On Exam)] : not present [de-identified] : grimacing in pain, well groomed male

## 2023-04-17 NOTE — REVIEW OF SYSTEMS
[Recent Weight Loss (___ Lbs)] : recent [unfilled] ~Ulb weight loss [Joint Swelling] : joint swelling [Joint Stiffness] : joint stiffness [Skin Wound] : skin wound [As Noted in HPI] : as noted in HPI [Negative] : Psychiatric [FreeTextEntry2] : started 10/22 dialysis [FreeTextEntry3] : vision issues secondary to diabetes [FreeTextEntry5] : s/p multiple coronary stents [FreeTextEntry8] : dialysis [FreeTextEntry9] : ROULA [de-identified] : poorly controlled in past

## 2023-04-17 NOTE — ASSESSMENT
[FreeTextEntry1] : 03/22/2023\par Left heel pressure ulcer\par recent hospitalization with sepsis\par patient has been using santyl\par Patient will be going [facility] now for Vascular studies and MARGARET\par importance of offloading given\par follow up 1 week\par follow up vascular surgery regarding LLE\par HBO discussed\par \par 4/12/2023-\par Wound straight depth 0.9cm, Probes to bone\par s/p excisional debridement\par HBE recommended\par Patient declined\par Patient declines offloading shoe\par \par 04/17/2023\par Left heel pressure ulcer\par TWO boot is not covered by insurance\par patient considering Hyperbaric oxygen therapy \par Evaluation will be done today\par Patient was evaluated for Hyperbaric treatment and is deemed a candidate for Hyperbaric oxygen therapy. Patient's h&p was reviewed and patient was given an orientation of the suite and treatment. Patient was educated on the risks and benefits of the treatment. Patient read the consent and signed it prior to the initiation of any screening procedure. Patient had the opportunity to discuss any questions regarding the Hyperbaric oxygen therapy. Writer witnessed the signing of the consent and the patient was given a copy of the signed consent.\par

## 2023-04-17 NOTE — PLAN
[FreeTextEntry1] : 3/8/23\par Plan - culture obtained\par santyl renewed to pharmacy\par tramadol dose adjusted to pharmacy for pain\par shower wound, santyl/gauze/foam/john, offload area\par supplies ordered\par follow up 1 week\par follow up vascular surgery regarding LLE\par Patient scheduled for vascular testing today\par patient not wearing offloading shoe as per family\par Offloading recommended\par patient scheduled for vascular testing.\par \par 4/12/23\par -Educated about benefits of HBO- pt states he will hold off for now\par -TWO boot ordered for patient \par -Plan: Dress wound with Santyl, wet to dry with vashe, john\par -Off-loading\par -RTO in 1 week\par \par 4/17/23\par Jc for auth\par clear chest xray on file

## 2023-05-01 ENCOUNTER — APPOINTMENT (OUTPATIENT)
Dept: WOUND CARE | Facility: CLINIC | Age: 63
End: 2023-05-01

## 2023-05-03 ENCOUNTER — OUTPATIENT (OUTPATIENT)
Dept: OUTPATIENT SERVICES | Facility: HOSPITAL | Age: 63
LOS: 1 days | End: 2023-05-03
Payer: COMMERCIAL

## 2023-05-03 ENCOUNTER — APPOINTMENT (OUTPATIENT)
Dept: WOUND CARE | Facility: CLINIC | Age: 63
End: 2023-05-03
Payer: COMMERCIAL

## 2023-05-03 VITALS — TEMPERATURE: 97 F

## 2023-05-03 DIAGNOSIS — L89.620 PRESSURE ULCER OF LEFT HEEL, UNSTAGEABLE: ICD-10-CM

## 2023-05-03 DIAGNOSIS — M21.6X2 OTHER ACQUIRED DEFORMITIES OF LEFT FOOT: ICD-10-CM

## 2023-05-03 DIAGNOSIS — Z98.89 OTHER SPECIFIED POSTPROCEDURAL STATES: Chronic | ICD-10-CM

## 2023-05-03 DIAGNOSIS — E11.319 TYPE 2 DIABETES MELLITUS WITH UNSPECIFIED DIABETIC RETINOPATHY WITHOUT MACULAR EDEMA: Chronic | ICD-10-CM

## 2023-05-03 DIAGNOSIS — M21.962 UNSPECIFIED ACQUIRED DEFORMITY OF LEFT LOWER LEG: ICD-10-CM

## 2023-05-03 DIAGNOSIS — I50.9 HEART FAILURE, UNSPECIFIED: Chronic | ICD-10-CM

## 2023-05-03 DIAGNOSIS — Z95.5 PRESENCE OF CORONARY ANGIOPLASTY IMPLANT AND GRAFT: Chronic | ICD-10-CM

## 2023-05-03 DIAGNOSIS — L89.623 PRESSURE ULCER OF LEFT HEEL, STAGE 3: ICD-10-CM

## 2023-05-03 PROCEDURE — 11042 DBRDMT SUBQ TIS 1ST 20SQCM/<: CPT

## 2023-05-04 PROBLEM — M21.6X2: Status: ACTIVE | Noted: 2023-05-04

## 2023-05-04 PROBLEM — M21.962 DEFORMITY OF LEFT FOOT: Status: ACTIVE | Noted: 2023-05-04

## 2023-05-04 PROBLEM — L89.623 PRESSURE INJURY OF LEFT HEEL, STAGE 3: Status: ACTIVE | Noted: 2023-03-08

## 2023-05-04 PROBLEM — L89.620 PRESSURE INJURY OF LEFT HEEL, UNSTAGEABLE: Status: ACTIVE | Noted: 2023-03-08

## 2023-05-04 NOTE — REVIEW OF SYSTEMS
[Recent Weight Loss (___ Lbs)] : recent [unfilled] ~Ulb weight loss [Joint Swelling] : joint swelling [Joint Stiffness] : joint stiffness [Skin Wound] : skin wound [As Noted in HPI] : as noted in HPI [Negative] : Psychiatric [FreeTextEntry2] : started 10/22 dialysis [FreeTextEntry3] : vision issues secondary to diabetes [FreeTextEntry5] : s/p multiple coronary stents [FreeTextEntry8] : dialysis [FreeTextEntry9] : ROULA [de-identified] : poorly controlled in past

## 2023-05-04 NOTE — PHYSICAL EXAM
[Skin Ulcer] : ulcer [Alert] : alert [Oriented to Person] : oriented to person [Oriented to Place] : oriented to place [Oriented to Time] : oriented to time [Calm] : calm [Please See PDF for Tissue Analytics] : Please See PDF for [employer] Analytics. [JVD] : no jugular venous distention  [Normal Breath Sounds] : Normal breath sounds [2+] : left 2+ [0] : left 0 [Ankle Swelling (On Exam)] : not present [Abdomen Tenderness] : ~T ~M No abdominal tenderness [de-identified] : grimacing in pain, well groomed male, unstable gait, walking with walker [de-identified] : AT [de-identified] : supple [de-identified] : soft

## 2023-05-04 NOTE — PLAN
[FreeTextEntry1] : 3/8/23\par Plan - culture obtained\par santyl renewed to pharmacy\par tramadol dose adjusted to pharmacy for pain\par shower wound, santyl/gauze/foam/john, offload area\par supplies ordered\par follow up 1 week\par follow up vascular surgery regarding LLE\par Patient scheduled for vascular testing today\par patient not wearing offloading shoe as per family\par Offloading recommended\par patient scheduled for vascular testing.\par \par 4/12/23\par -Educated about benefits of HBO- pt states he will hold off for now\par -TWO boot ordered for patient \par -Plan: Dress wound with Santyl, wet to dry with vashe, john\par -Off-loading\par -RTO in 1 week\par \par 4/17/23\par Awaith for auth\par clear chest xray on file\par \par 5/3/23\par Mirragen applied to left heel, cover with aquacel and john\par off-loading\par Patient requires stabilization for medical reasons and has the potential to benefit functionally.\par follow up in 1 week\par \par

## 2023-05-10 ENCOUNTER — OUTPATIENT (OUTPATIENT)
Dept: OUTPATIENT SERVICES | Facility: HOSPITAL | Age: 63
LOS: 1 days | End: 2023-05-10
Payer: COMMERCIAL

## 2023-05-10 ENCOUNTER — APPOINTMENT (OUTPATIENT)
Dept: WOUND CARE | Facility: CLINIC | Age: 63
End: 2023-05-10
Payer: COMMERCIAL

## 2023-05-10 VITALS — TEMPERATURE: 97.4 F

## 2023-05-10 DIAGNOSIS — Z95.5 PRESENCE OF CORONARY ANGIOPLASTY IMPLANT AND GRAFT: Chronic | ICD-10-CM

## 2023-05-10 DIAGNOSIS — E11.319 TYPE 2 DIABETES MELLITUS WITH UNSPECIFIED DIABETIC RETINOPATHY WITHOUT MACULAR EDEMA: Chronic | ICD-10-CM

## 2023-05-10 DIAGNOSIS — I50.9 HEART FAILURE, UNSPECIFIED: Chronic | ICD-10-CM

## 2023-05-10 DIAGNOSIS — L89.604: ICD-10-CM

## 2023-05-10 DIAGNOSIS — Z98.89 OTHER SPECIFIED POSTPROCEDURAL STATES: Chronic | ICD-10-CM

## 2023-05-10 DIAGNOSIS — L89.624 PRESSURE ULCER OF LEFT HEEL, STAGE 4: ICD-10-CM

## 2023-05-10 PROCEDURE — 99214 OFFICE O/P EST MOD 30 MIN: CPT

## 2023-05-10 PROCEDURE — G0463: CPT

## 2023-05-10 NOTE — REVIEW OF SYSTEMS
[Recent Weight Loss (___ Lbs)] : recent [unfilled] ~Ulb weight loss [Joint Swelling] : joint swelling [Joint Stiffness] : joint stiffness [Skin Wound] : skin wound [As Noted in HPI] : as noted in HPI [Negative] : Heme/Lymph [FreeTextEntry2] : started 10/22 dialysis [FreeTextEntry3] : vision issues secondary to diabetes [FreeTextEntry5] : s/p multiple coronary stents [FreeTextEntry8] : dialysis [FreeTextEntry9] : ROULA [de-identified] : poorly controlled in past

## 2023-05-10 NOTE — ASSESSMENT
[FreeTextEntry1] : 03/22/2023\par Left heel pressure ulcer\par recent hospitalization with sepsis\par patient has been using santyl\par Patient will be going [facility] now for Vascular studies and MARGARET\par importance of offloading given\par follow up 1 week\par follow up vascular surgery regarding LLE\par HBO discussed\par \par 4/12/2023-\par Wound straight depth 0.9cm, Probes to bone\par s/p excisional debridement\par HBE recommended\par Patient declined\par Patient declines offloading shoe\par \par 04/17/2023\par Left heel pressure ulcer\par TWO boot is not covered by insurance\par patient considering Hyperbaric oxygen therapy \par Evaluation will be done today\par Patient was evaluated for Hyperbaric treatment and is deemed a candidate for Hyperbaric oxygen therapy. Patient's h&p was reviewed and patient was given an orientation of the suite and treatment. Patient was educated on the risks and benefits of the treatment. Patient read the consent and signed it prior to the initiation of any screening procedure. Patient had the opportunity to discuss any questions regarding the Hyperbaric oxygen therapy. Writer witnessed the signing of the consent and the patient was given a copy of the signed consent.\par \par 5-10-23:\par Pt here for f/u\par Accompanied by wife\par No new complaints\par changing outer dressing daily\par Mirragen applied last week\par On exam\par left heel wound s/p mechanical debridement\par residual mirragen left in place.  More mirragen applied to granular tissue\par no s/s of infection\par \par

## 2023-05-10 NOTE — PHYSICAL EXAM
[0] : left 0 [Skin Ulcer] : ulcer [Alert] : alert [Oriented to Person] : oriented to person [Oriented to Place] : oriented to place [Oriented to Time] : oriented to time [Calm] : calm [Please See PDF for Tissue Analytics] : Please See PDF for [employer] Analytics. [Ankle Swelling (On Exam)] : not present [de-identified] : grimacing in pain, well groomed male, unstable gait, walking with walker [de-identified] : AT [de-identified] : equal chest rise [de-identified] : supple [de-identified] : uses walker

## 2023-05-10 NOTE — PLAN
[FreeTextEntry1] : 3/8/23\par Plan - culture obtained\par santyl renewed to pharmacy\par tramadol dose adjusted to pharmacy for pain\par shower wound, santyl/gauze/foam/john, offload area\par supplies ordered\par follow up 1 week\par follow up vascular surgery regarding LLE\par Patient scheduled for vascular testing today\par patient not wearing offloading shoe as per family\par Offloading recommended\par patient scheduled for vascular testing.\par \par 4/12/23\par -Educated about benefits of HBO- pt states he will hold off for now\par -TWO boot ordered for patient \par -Plan: Dress wound with Santyl, wet to dry with vashe, john\par -Off-loading\par -RTO in 1 week\par \par 4/17/23\par Awaith for auth\par clear chest xray on file\par \par 5/3/23\par Mirragen applied to left heel, cover with aquacel and john\par off-loading\par Patient requires stabilization for medical reasons and has the potential to benefit functionally.\par follow up in 1 week\par \par 5-10-23:\par Mirragen applied to left heel, (application #2) wound veil placed.  cover with aquacel and john\par change outer  dressing 3x/week.  Leave wound veil in place\par offloading\par .follow up in 1 week\par

## 2023-05-15 ENCOUNTER — OUTPATIENT (OUTPATIENT)
Dept: OUTPATIENT SERVICES | Facility: HOSPITAL | Age: 63
LOS: 1 days | End: 2023-05-15
Payer: MEDICARE

## 2023-05-15 ENCOUNTER — APPOINTMENT (OUTPATIENT)
Dept: WOUND CARE | Facility: CLINIC | Age: 63
End: 2023-05-15
Payer: COMMERCIAL

## 2023-05-15 DIAGNOSIS — Z95.5 PRESENCE OF CORONARY ANGIOPLASTY IMPLANT AND GRAFT: Chronic | ICD-10-CM

## 2023-05-15 DIAGNOSIS — E11.319 TYPE 2 DIABETES MELLITUS WITH UNSPECIFIED DIABETIC RETINOPATHY WITHOUT MACULAR EDEMA: Chronic | ICD-10-CM

## 2023-05-15 DIAGNOSIS — Z98.89 OTHER SPECIFIED POSTPROCEDURAL STATES: Chronic | ICD-10-CM

## 2023-05-15 DIAGNOSIS — L98.491 NON-PRESSURE CHRONIC ULCER OF SKIN OF OTHER SITES LIMITED TO BREAKDOWN OF SKIN: ICD-10-CM

## 2023-05-15 DIAGNOSIS — I50.9 HEART FAILURE, UNSPECIFIED: Chronic | ICD-10-CM

## 2023-05-15 PROCEDURE — 11042 DBRDMT SUBQ TIS 1ST 20SQCM/<: CPT

## 2023-05-16 ENCOUNTER — APPOINTMENT (OUTPATIENT)
Dept: ENDOCRINOLOGY | Facility: CLINIC | Age: 63
End: 2023-05-16
Payer: COMMERCIAL

## 2023-05-16 VITALS
SYSTOLIC BLOOD PRESSURE: 115 MMHG | OXYGEN SATURATION: 96 % | BODY MASS INDEX: 26.37 KG/M2 | WEIGHT: 168 LBS | DIASTOLIC BLOOD PRESSURE: 65 MMHG | HEIGHT: 67 IN | HEART RATE: 69 BPM

## 2023-05-16 PROCEDURE — 99214 OFFICE O/P EST MOD 30 MIN: CPT

## 2023-05-16 NOTE — HISTORY OF PRESENT ILLNESS
[Continuous Glucose Monitoring] : Continuous Glucose Monitoring: Yes [Homer] : Homer [FreeTextEntry1] : Patient is a 62-year-old man with history of type 2 diabetes, hypertension, CKD, CAD status post cardiac stents, hypertension, hyperlipidemia, here for endocrinology follow-up visit.\par \par Regarding type 2 diabetes, diagnosed more than 20+ years, complicated by diabetic retinopathy, neuropathy, and nephropathy, now pending AV fistula placement and potential dialysis.\par \par Diabetes also complicated by CAD status post cardiac stents. He has a CardioMEMS. No no history of CHF or CVA in the past. He follows up with Dr. Jonathan Torres  from [facility].\par \par Currently taking Toujeo 15 units at bedtime and Humalog 5 units TID with meals; and he's using low does sliding scale.  \par \par He states that there's a problem with his sensor, he's not \par \par He is here with his wife today, who is a dialysis technician. Both patient and wife endorsed poor dietary habits. Patient lost his job last year, has been sitting at home and not doing much since then. Usually eats Chinese takeout for lunch and dinner. Patient endorsed that he likes carbohydrate and likes rice and noodles and think is part of his cultures to eat them all the time.\par \par Regarding hypertension, currently managed by his new nephrologist. He is pending AV fistula creation with vascular doctor in the next few weeks. He may be heading towards hemodialysis.\par \par Regarding hyperlipidemia patient is on statin therapy with atorvastatin 80 mg once daily as well as fenofibrate 48 mg once daily.\par \par 24 hour diet recall: now eating better as he is more adjusted to the dialysis.  (MWF @6 am in the morning)\par Breakfast: Coffee and bread\par Lunch @ variable: Rice and some meat.  Wife endorsed that maybe 1.5 cup of rice, sometimes fish and meat and vegetables and soup with vegetables. \par Dinner @ [facility] time: rice with meat and vegetable.   [Hypoglycemia] : Patient is not hypoglycemic. [FreeTextEntry2] : 18 [FreeTextEntry3] : 82 [FreeTextEntry4] : 0 [de-identified] : 10.2 [FreeTextEntry5] : 290 [FreeTextEntry6] : 31.8

## 2023-05-16 NOTE — ASSESSMENT
[FreeTextEntry1] : Mr. ADAM KOWALSKI is a 63 year yo M here for evaluation and treatment of diabetes mellitus type II, poorly controlled with multiple microvascular and macrovascular complication. \par \par 1. Type 2 diabetes mellitus\par Point-of-care 7.2% on March 29, 2023.\par However, likely not accurate as patient is on hemodialysis.\par CGM downloaded, A1c is likely in greater than 10% range.\par Appetite had improved since last visit.\par Recommend to increase Toujeo to 20 units at bedtime\par Recommend to increase Humalog to 8 units 3 times daily with meals with low-dose sliding scale\par Recommend to restart Trulicity 0.75 mg once weekly.\par Glucose significantly elevated postprandially due to dietary indiscretion\par Patient's glucose control was much better while he was hospitalized with a diet controlled.\par Continue to follow-up with ophthalmologist once annually.  Ophthalmologist is aware that patient is currently on GLP-1 receptor agonist.\par Attending wound care for leg wounds for podiatry care.\par \par Discussed risks of the thyroid C-cell tumor, pancreatitis, hypoglycemia, hypersensitivity reactions, acute kidney injury, severe gastrointestinal complications, discussed that the most common adverse infections included nausea, diarrhea, vomiting, and abdominal pain.  We discussed that gastric emptying slow by GLP-1 receptor agonist.  \par Diabetic retinoapthy complications have been reported in a cardiovascular outcomes trial. Should have semi-annual eye exam and close follow up with his/her ophthalmologist.\par \par He is getting foot care every few days.  \par \par 2.  Hypertension\par Blood pressure goal <130/80\par Currently at goal\par Following up with nephrology, patient is on hemodialysis.\par \par 3.  Hyperlipidemia\par Patient had high triglyceride level in the past\par We will repeat fasting lipid profile\par Continue with fenofibrate renally dosed\par Continue with atorvastatin 80 mg once daily\par Discussed diet and exercise\par Follow-up with [facility] in 3 months\par Follow-up with me in 6 months\par We will contact patient in 1 month to review CGM.\par \par Reset patient's password for freestyle patience\par \par EDUCATION: Reviewed ‘ABCs’ of diabetes management (respective goals in parentheses): A1C (<7), blood pressure (<140/90), and cholesterol (LDL <100).\par \par COMPLIANCE at present is estimated to be: poor. \par FOLLOW UP: I recommend that frequency of visits for diabetes care be every 3 month \par \par I'll review his CMG in 2 weeks and make further adjustment if needed.  \par \par \par [email or web address]\par Glucose2!

## 2023-05-18 PROBLEM — L98.491 NON-PRESSURE CHRONIC ULCER OF SKIN OF OTHER SITES LIMITED TO BREAKDOWN OF SKIN: Status: ACTIVE | Noted: 2023-04-19

## 2023-05-23 ENCOUNTER — INPATIENT (INPATIENT)
Facility: HOSPITAL | Age: 63
LOS: 5 days | Discharge: HOME | DRG: 871 | End: 2023-05-29
Attending: INTERNAL MEDICINE | Admitting: INTERNAL MEDICINE
Payer: COMMERCIAL

## 2023-05-23 VITALS
DIASTOLIC BLOOD PRESSURE: 88 MMHG | TEMPERATURE: 100 F | OXYGEN SATURATION: 96 % | SYSTOLIC BLOOD PRESSURE: 145 MMHG | HEART RATE: 78 BPM | WEIGHT: 179.9 LBS | RESPIRATION RATE: 22 BRPM | HEIGHT: 68 IN

## 2023-05-23 DIAGNOSIS — I50.9 HEART FAILURE, UNSPECIFIED: Chronic | ICD-10-CM

## 2023-05-23 DIAGNOSIS — E11.319 TYPE 2 DIABETES MELLITUS WITH UNSPECIFIED DIABETIC RETINOPATHY WITHOUT MACULAR EDEMA: Chronic | ICD-10-CM

## 2023-05-23 DIAGNOSIS — I10 ESSENTIAL (PRIMARY) HYPERTENSION: ICD-10-CM

## 2023-05-23 DIAGNOSIS — R41.82 ALTERED MENTAL STATUS, UNSPECIFIED: ICD-10-CM

## 2023-05-23 DIAGNOSIS — Z95.5 PRESENCE OF CORONARY ANGIOPLASTY IMPLANT AND GRAFT: Chronic | ICD-10-CM

## 2023-05-23 DIAGNOSIS — Z98.89 OTHER SPECIFIED POSTPROCEDURAL STATES: Chronic | ICD-10-CM

## 2023-05-23 DIAGNOSIS — E11.9 TYPE 2 DIABETES MELLITUS WITHOUT COMPLICATIONS: ICD-10-CM

## 2023-05-23 DIAGNOSIS — N18.6 END STAGE RENAL DISEASE: ICD-10-CM

## 2023-05-23 LAB
ALBUMIN SERPL ELPH-MCNC: 3.1 G/DL — LOW (ref 3.3–5)
ALP SERPL-CCNC: 87 U/L — SIGNIFICANT CHANGE UP (ref 40–120)
ALT FLD-CCNC: 9 U/L — LOW (ref 10–45)
ANION GAP SERPL CALC-SCNC: 16 MMOL/L — SIGNIFICANT CHANGE UP (ref 5–17)
APTT BLD: 32.2 SEC — SIGNIFICANT CHANGE UP (ref 27.5–35.5)
AST SERPL-CCNC: 9 U/L — LOW (ref 10–40)
BASE EXCESS BLDV CALC-SCNC: 2.8 MMOL/L — SIGNIFICANT CHANGE UP (ref -2–3)
BASOPHILS # BLD AUTO: 0.04 K/UL — SIGNIFICANT CHANGE UP (ref 0–0.2)
BASOPHILS NFR BLD AUTO: 0.4 % — SIGNIFICANT CHANGE UP (ref 0–2)
BILIRUB SERPL-MCNC: 0.8 MG/DL — SIGNIFICANT CHANGE UP (ref 0.2–1.2)
BUN SERPL-MCNC: 49 MG/DL — HIGH (ref 7–23)
CA-I SERPL-SCNC: 1.05 MMOL/L — LOW (ref 1.15–1.33)
CALCIUM SERPL-MCNC: 8.1 MG/DL — LOW (ref 8.4–10.5)
CHLORIDE BLDV-SCNC: 98 MMOL/L — SIGNIFICANT CHANGE UP (ref 96–108)
CHLORIDE SERPL-SCNC: 98 MMOL/L — SIGNIFICANT CHANGE UP (ref 96–108)
CK MB CFR SERPL CALC: <1 NG/ML — SIGNIFICANT CHANGE UP (ref 0–6.7)
CK SERPL-CCNC: 74 U/L — SIGNIFICANT CHANGE UP (ref 30–200)
CO2 BLDV-SCNC: 29 MMOL/L — HIGH (ref 22–26)
CO2 SERPL-SCNC: 24 MMOL/L — SIGNIFICANT CHANGE UP (ref 22–31)
CREAT SERPL-MCNC: 4.09 MG/DL — HIGH (ref 0.5–1.3)
EGFR: 16 ML/MIN/1.73M2 — LOW
EOSINOPHIL # BLD AUTO: 0.08 K/UL — SIGNIFICANT CHANGE UP (ref 0–0.5)
EOSINOPHIL NFR BLD AUTO: 0.7 % — SIGNIFICANT CHANGE UP (ref 0–6)
GAS PNL BLDV: 135 MMOL/L — LOW (ref 136–145)
GAS PNL BLDV: SIGNIFICANT CHANGE UP
GAS PNL BLDV: SIGNIFICANT CHANGE UP
GLUCOSE BLDC GLUCOMTR-MCNC: 141 MG/DL — HIGH (ref 70–99)
GLUCOSE BLDC GLUCOMTR-MCNC: 205 MG/DL — HIGH (ref 70–99)
GLUCOSE BLDC GLUCOMTR-MCNC: 211 MG/DL — HIGH (ref 70–99)
GLUCOSE BLDV-MCNC: 241 MG/DL — HIGH (ref 70–99)
GLUCOSE SERPL-MCNC: 241 MG/DL — HIGH (ref 70–99)
HCO3 BLDV-SCNC: 28 MMOL/L — SIGNIFICANT CHANGE UP (ref 22–29)
HCT VFR BLD CALC: 37.7 % — LOW (ref 39–50)
HCT VFR BLDA CALC: 37 % — LOW (ref 39–51)
HGB BLD CALC-MCNC: 12.3 G/DL — LOW (ref 12.6–17.4)
HGB BLD-MCNC: 12 G/DL — LOW (ref 13–17)
IMM GRANULOCYTES NFR BLD AUTO: 0.8 % — SIGNIFICANT CHANGE UP (ref 0–0.9)
INR BLD: 1.26 RATIO — HIGH (ref 0.88–1.16)
LACTATE BLDV-MCNC: 1.8 MMOL/L — SIGNIFICANT CHANGE UP (ref 0.5–2)
LYMPHOCYTES # BLD AUTO: 1.43 K/UL — SIGNIFICANT CHANGE UP (ref 1–3.3)
LYMPHOCYTES # BLD AUTO: 13.4 % — SIGNIFICANT CHANGE UP (ref 13–44)
MCHC RBC-ENTMCNC: 28.7 PG — SIGNIFICANT CHANGE UP (ref 27–34)
MCHC RBC-ENTMCNC: 31.8 GM/DL — LOW (ref 32–36)
MCV RBC AUTO: 90.2 FL — SIGNIFICANT CHANGE UP (ref 80–100)
MONOCYTES # BLD AUTO: 1.08 K/UL — HIGH (ref 0–0.9)
MONOCYTES NFR BLD AUTO: 10.1 % — SIGNIFICANT CHANGE UP (ref 2–14)
NEUTROPHILS # BLD AUTO: 7.97 K/UL — HIGH (ref 1.8–7.4)
NEUTROPHILS NFR BLD AUTO: 74.6 % — SIGNIFICANT CHANGE UP (ref 43–77)
NRBC # BLD: 0 /100 WBCS — SIGNIFICANT CHANGE UP (ref 0–0)
PCO2 BLDV: 44 MMHG — SIGNIFICANT CHANGE UP (ref 42–55)
PH BLDV: 7.41 — SIGNIFICANT CHANGE UP (ref 7.32–7.43)
PLATELET # BLD AUTO: 126 K/UL — LOW (ref 150–400)
PO2 BLDV: 52 MMHG — HIGH (ref 25–45)
POTASSIUM BLDV-SCNC: 3.3 MMOL/L — LOW (ref 3.5–5.1)
POTASSIUM SERPL-MCNC: 3.3 MMOL/L — LOW (ref 3.5–5.3)
POTASSIUM SERPL-SCNC: 3.3 MMOL/L — LOW (ref 3.5–5.3)
PROT SERPL-MCNC: 6.3 G/DL — SIGNIFICANT CHANGE UP (ref 6–8.3)
PROTHROM AB SERPL-ACNC: 14.6 SEC — HIGH (ref 10.5–13.4)
RAPID RVP RESULT: SIGNIFICANT CHANGE UP
RBC # BLD: 4.18 M/UL — LOW (ref 4.2–5.8)
RBC # FLD: 15.7 % — HIGH (ref 10.3–14.5)
SAO2 % BLDV: 79.5 % — SIGNIFICANT CHANGE UP (ref 67–88)
SARS-COV-2 RNA SPEC QL NAA+PROBE: SIGNIFICANT CHANGE UP
SODIUM SERPL-SCNC: 138 MMOL/L — SIGNIFICANT CHANGE UP (ref 135–145)
TROPONIN T, HIGH SENSITIVITY RESULT: 130 NG/L — HIGH (ref 0–51)
TROPONIN T, HIGH SENSITIVITY RESULT: 131 NG/L — HIGH (ref 0–51)
WBC # BLD: 10.69 K/UL — HIGH (ref 3.8–10.5)
WBC # FLD AUTO: 10.69 K/UL — HIGH (ref 3.8–10.5)

## 2023-05-23 PROCEDURE — 71045 X-RAY EXAM CHEST 1 VIEW: CPT | Mod: 26

## 2023-05-23 PROCEDURE — 99285 EMERGENCY DEPT VISIT HI MDM: CPT

## 2023-05-23 PROCEDURE — 70450 CT HEAD/BRAIN W/O DYE: CPT | Mod: 26

## 2023-05-23 RX ORDER — FOLIC ACID 0.8 MG
1 TABLET ORAL
Qty: 0 | Refills: 0 | DISCHARGE

## 2023-05-23 RX ORDER — INSULIN GLARGINE 100 [IU]/ML
8 INJECTION, SOLUTION SUBCUTANEOUS AT BEDTIME
Refills: 0 | Status: DISCONTINUED | OUTPATIENT
Start: 2023-05-23 | End: 2023-05-24

## 2023-05-23 RX ORDER — VALSARTAN 80 MG/1
40 TABLET ORAL DAILY
Refills: 0 | Status: DISCONTINUED | OUTPATIENT
Start: 2023-05-23 | End: 2023-05-29

## 2023-05-23 RX ORDER — ATORVASTATIN CALCIUM 80 MG/1
80 TABLET, FILM COATED ORAL AT BEDTIME
Refills: 0 | Status: DISCONTINUED | OUTPATIENT
Start: 2023-05-23 | End: 2023-05-29

## 2023-05-23 RX ORDER — FAMOTIDINE 10 MG/ML
1 INJECTION INTRAVENOUS
Qty: 0 | Refills: 0 | DISCHARGE

## 2023-05-23 RX ORDER — INSULIN LISPRO 100/ML
VIAL (ML) SUBCUTANEOUS
Refills: 0 | Status: DISCONTINUED | OUTPATIENT
Start: 2023-05-23 | End: 2023-05-29

## 2023-05-23 RX ORDER — HEPARIN SODIUM 5000 [USP'U]/ML
5000 INJECTION INTRAVENOUS; SUBCUTANEOUS EVERY 12 HOURS
Refills: 0 | Status: DISCONTINUED | OUTPATIENT
Start: 2023-05-23 | End: 2023-05-29

## 2023-05-23 RX ORDER — CARVEDILOL PHOSPHATE 80 MG/1
12.5 CAPSULE, EXTENDED RELEASE ORAL EVERY 12 HOURS
Refills: 0 | Status: DISCONTINUED | OUTPATIENT
Start: 2023-05-23 | End: 2023-05-29

## 2023-05-23 RX ORDER — ALLOPURINOL 300 MG
100 TABLET ORAL DAILY
Refills: 0 | Status: DISCONTINUED | OUTPATIENT
Start: 2023-05-23 | End: 2023-05-29

## 2023-05-23 RX ORDER — CEFTRIAXONE 500 MG/1
1000 INJECTION, POWDER, FOR SOLUTION INTRAMUSCULAR; INTRAVENOUS EVERY 24 HOURS
Refills: 0 | Status: DISCONTINUED | OUTPATIENT
Start: 2023-05-23 | End: 2023-05-24

## 2023-05-23 RX ORDER — SENNA PLUS 8.6 MG/1
2 TABLET ORAL AT BEDTIME
Refills: 0 | Status: DISCONTINUED | OUTPATIENT
Start: 2023-05-23 | End: 2023-05-29

## 2023-05-23 RX ORDER — LIDOCAINE 4 G/100G
1 CREAM TOPICAL
Qty: 0 | Refills: 0 | DISCHARGE

## 2023-05-23 RX ORDER — SODIUM CHLORIDE 9 MG/ML
500 INJECTION, SOLUTION INTRAVENOUS ONCE
Refills: 0 | Status: COMPLETED | OUTPATIENT
Start: 2023-05-23 | End: 2023-05-23

## 2023-05-23 RX ORDER — ASCORBIC ACID 60 MG
1 TABLET,CHEWABLE ORAL
Qty: 0 | Refills: 0 | DISCHARGE

## 2023-05-23 RX ORDER — CLOPIDOGREL BISULFATE 75 MG/1
75 TABLET, FILM COATED ORAL DAILY
Refills: 0 | Status: DISCONTINUED | OUTPATIENT
Start: 2023-05-23 | End: 2023-05-29

## 2023-05-23 RX ORDER — ASPIRIN/CALCIUM CARB/MAGNESIUM 324 MG
81 TABLET ORAL DAILY
Refills: 0 | Status: DISCONTINUED | OUTPATIENT
Start: 2023-05-23 | End: 2023-05-29

## 2023-05-23 RX ORDER — VANCOMYCIN HCL 1 G
1000 VIAL (EA) INTRAVENOUS ONCE
Refills: 0 | Status: COMPLETED | OUTPATIENT
Start: 2023-05-23 | End: 2023-05-23

## 2023-05-23 RX ORDER — ACETAMINOPHEN 500 MG
1000 TABLET ORAL ONCE
Refills: 0 | Status: DISCONTINUED | OUTPATIENT
Start: 2023-05-23 | End: 2023-05-29

## 2023-05-23 RX ORDER — TAMSULOSIN HYDROCHLORIDE 0.4 MG/1
0.4 CAPSULE ORAL AT BEDTIME
Refills: 0 | Status: DISCONTINUED | OUTPATIENT
Start: 2023-05-23 | End: 2023-05-29

## 2023-05-23 RX ADMIN — Medication 2: at 20:13

## 2023-05-23 RX ADMIN — CLOPIDOGREL BISULFATE 75 MILLIGRAM(S): 75 TABLET, FILM COATED ORAL at 19:04

## 2023-05-23 RX ADMIN — SODIUM CHLORIDE 500 MILLILITER(S): 9 INJECTION, SOLUTION INTRAVENOUS at 11:56

## 2023-05-23 RX ADMIN — INSULIN GLARGINE 8 UNIT(S): 100 INJECTION, SOLUTION SUBCUTANEOUS at 22:38

## 2023-05-23 RX ADMIN — CEFTRIAXONE 100 MILLIGRAM(S): 500 INJECTION, POWDER, FOR SOLUTION INTRAMUSCULAR; INTRAVENOUS at 22:39

## 2023-05-23 RX ADMIN — SENNA PLUS 2 TABLET(S): 8.6 TABLET ORAL at 22:37

## 2023-05-23 RX ADMIN — Medication 250 MILLIGRAM(S): at 11:57

## 2023-05-23 RX ADMIN — CARVEDILOL PHOSPHATE 12.5 MILLIGRAM(S): 80 CAPSULE, EXTENDED RELEASE ORAL at 20:12

## 2023-05-23 RX ADMIN — TAMSULOSIN HYDROCHLORIDE 0.4 MILLIGRAM(S): 0.4 CAPSULE ORAL at 22:37

## 2023-05-23 RX ADMIN — Medication 81 MILLIGRAM(S): at 19:04

## 2023-05-23 RX ADMIN — HEPARIN SODIUM 5000 UNIT(S): 5000 INJECTION INTRAVENOUS; SUBCUTANEOUS at 19:16

## 2023-05-23 RX ADMIN — ATORVASTATIN CALCIUM 80 MILLIGRAM(S): 80 TABLET, FILM COATED ORAL at 22:37

## 2023-05-23 RX ADMIN — Medication 100 MILLIGRAM(S): at 19:04

## 2023-05-23 RX ADMIN — Medication 100 MILLIGRAM(S): at 20:13

## 2023-05-23 NOTE — CONSULT NOTE ADULT - ASSESSMENT
Assessment  DMT2: 62y Male with DM T2 with hyperglycemia admitted with generalized weakness and fevers, was on insulin at home last A1c 7%, sugars trending high, not eating full meals.   Weakness: will check TFT's, WBC elevated, infectious work up.  CAD: no chest pain, hx of stents, monitored.  HTN: On antihypertensive medications, monitored, asymptomatic.  HLD: on statin, stable, monitored.   ESRD: On hemodialysis, labs, chart reviewed.            Discussed plan and management with Dr Tigist Camacho NP - TEAMS  Rubén Escoto MD  Cell: [phone]  Office: [phone]              Assessment  DMT2: 62y Male with DM T2 with hyperglycemia admitted with generalized weakness and fevers, was on insulin at home last A1c 7%, sugars trending high, not  eating full meals.   Weakness: will check TFT's, WBC elevated, infectious work up.  CAD: no chest pain, hx of stents, monitored.  HTN: On antihypertensive medications, monitored, asymptomatic.  HLD: on statin, stable, monitored.   ESRD: On hemodialysis, labs, chart reviewed.            Discussed plan and management with Dr Tigist Camacho NP - TEAMS  Rubén Escoto MD  Cell: [phone]  Office: [phone]

## 2023-05-23 NOTE — CONSULT NOTE ADULT - SUBJECTIVE AND OBJECTIVE BOX
NEPHROLOGY - NSN    Patient seen and examined.    HPI:  Date of Service, 05-23-23 @ 10:13  CHIEF COMPLAINT:Patient is a 63y old  Male who presents with a chief complaint of fevers (23 May 2023 10:07)      HPI:  62yo M, hx of ESRD on HD MWF, CAD s/p stents, CHF, DM, presents with generalized weakness for 1 day. Per wife, patient seemed weak before dialysis yesterday but worsened after dialysis. Unable to stand up, very sleepy, poor appetite, seemed confused. Also with fever  He goes to [facility];  Access is without issues and there is no other constitution sx.  Has a small lesion on his foot that is present for yrs and healing   Next HD in am                PAST MEDICAL & SURGICAL HISTORY:  Diabetes Mellitus Type II, Uncontrolled      Dyslipidemia      s/p Angioplasty with Stent      HTN (hypertension)      Gout      Diabetic retinopathy      GERD (gastroesophageal reflux disease)      Nephrolithiasis      Vitamin D deficiency      Hepatitis  ESRD      Ischemic cardiomyopathy      ASHD (arteriosclerotic heart disease)      Pulmonary hypertension      Myocardial infarction      CAD (coronary artery disease)      BPH (benign prostatic hyperplasia)      Retinal Hemorrhage      S/P coronary artery stent placement  2010 TRICIA to Diag ; 11/18/2010  LAD; 2/4/11 ATOM liberte Diag; 5/15/2017  TRICIA to 1st diag; 6/7/17 PCI with laser and high pressure balloon      History of eye surgery  left eye      H/O lithotripsy      Diabetes with retinopathy  S/P PPV/PRP/GAS  OD 2/22/17 for viterous hemorrhage      CHF with cardiomyopathy  Insertion of Cardiomems monitor      Stented coronary artery          MEDICATIONS  (STANDING):  allopurinol 100 milliGRAM(s) Oral daily  aspirin  chewable 81 milliGRAM(s) Oral daily  atorvastatin 80 milliGRAM(s) Oral at bedtime  carvedilol 12.5 milliGRAM(s) Oral every 12 hours  clopidogrel Tablet 75 milliGRAM(s) Oral daily  heparin   Injectable 5000 Unit(s) SubCutaneous every 12 hours  senna 2 Tablet(s) Oral at bedtime  tamsulosin 0.4 milliGRAM(s) Oral at bedtime  valsartan 40 milliGRAM(s) Oral daily      Allergies    No Known Drug Allergies  shellfish (Unknown)    Intolerances        SOCIAL HISTORY:  Denies alcohol abuse, drug abuse or tobacco usage.     FAMILY HISTORY:  Family history of cardiac disorder in father (Father)        VITALS:  T(C): 37.9 (05-23-23 @ 09:15), Max: 37.9 (05-23-23 @ 09:15)  HR: 79 (05-23-23 @ 09:15) (78 - 79)  BP: 136/67 (05-23-23 @ 09:15) (136/67 - 145/88)  RR: 21 (05-23-23 @ 09:15) (21 - 22)  SpO2: 100% (05-23-23 @ 09:15) (89% - 100%)    REVIEW OF SYSTEMS:  +  fever.   Good oral intake and denies fatigue or weakness. All other pertinent systems are reviewed and are negative.    PHYSICAL EXAM:  Constitutional: NAD  HEENT: EOMI  Neck:  No JVD, supple   Respiratory: CTA B/L  Cardiovascular: S1 and S2, RRR  Gastrointestinal: + BS, soft, NT, ND  Extremities: No peripheral edema, + peripheral pulses  Neurological: A/O x 3, CN2-12 intact  Psychiatric: Normal mood, normal affect  : No Jay  Skin: No rashes, C/D/I  Access: avf left brachial     I and O's:    Height (cm): 172.7 (05-23 @ 08:53)  Weight (kg): 81.6 (05-23 @ 08:53)  BMI (kg/m2): 27.4 (05-23 @ 08:53)  BSA (m2): 1.95 (05-23 @ 08:53)    LABS:                        12.0   10.69 )-----------( 126      ( 23 May 2023 10:12 )             37.7     05-23    138  |  98  |  49<H>  ----------------------------<  241<H>  3.3<L>   |  24  |  4.09<H>    Ca    8.1<L>      23 May 2023 10:12    TPro  6.3  /  Alb  3.1<L>  /  TBili  0.8  /  DBili  x   /  AST  9<L>  /  ALT  9<L>  /  AlkPhos  87  05-23      URINE:      RADIOLOGY & ADDITIONAL STUDIES:    < from: Xray Chest 1 View- PORTABLE-Urgent (05.23.23 @ 09:51) >    ACC: [id number] EXAM:  XR CHEST PORTABLE URGENT 1V   ORDERED BY: EDEN NICKERSON     PROCEDURE DATE:  05/23/2023          INTERPRETATION:  EXAMINATION: XR CHEST URGENT    CLINICAL INDICATION: Sepsis    TECHNIQUE: Single frontal, portable view of the chest was obtained.    COMPARISON: Chest x-ray 3/14/2023    FINDINGS:    LUNGS/PLEURA: Increased right mid and lower lung opacity. No left pleural   effusion. No pneumothorax.    HEART AND MEDIASTINUM: The heart size is not accurately measured in this   projection.    OTHER: No acute osseous abnormalities.    IMPRESSION:    Increased right lung opacities, possibly pneumonia.    --- End of Report ---

## 2023-05-23 NOTE — PROGRESS NOTE ADULT - SUBJECTIVE AND OBJECTIVE BOX
NEPHROLOGY - NSN    Patient seen and examined.    HPI:  64yo M, hx of ESRD on HD MWF, CAD s/p stents, CHF, DM, presents with generalized weakness for 1 day. Per wife, patient seemed weak before dialysis yesterday but worsened after dialysis. Unable to stand up, very sleepy, poor appetite, seemed confused. Also with fever  Pt goes to [facility] for [facility]   All hx for from the chart     PAST MEDICAL & SURGICAL HISTORY:  Diabetes Mellitus Type II, Uncontrolled      Dyslipidemia      s/p Angioplasty with Stent      HTN (hypertension)      Gout      Diabetic retinopathy      GERD (gastroesophageal reflux disease)      Nephrolithiasis      Vitamin D deficiency      Hepatitis      CKD (chronic kidney disease)      Ischemic cardiomyopathy      ASHD (arteriosclerotic heart disease)      Pulmonary hypertension      Myocardial infarction      CAD (coronary artery disease)      BPH (benign prostatic hyperplasia)      Retinal Hemorrhage      S/P coronary artery stent placement  2010 TRICIA to Diag ; 11/18/2010  LAD; 2/4/11 ATOM liberte Diag; 5/15/2017  TRICIA to 1st diag; 6/7/17 PCI with laser and high pressure balloon      History of eye surgery  left eye      H/O lithotripsy      Diabetes with retinopathy  S/P PPV/PRP/GAS  OD 2/22/17 for viterous hemorrhage      CHF with cardiomyopathy  Insertion of Cardiomems monitor      Stented coronary artery          MEDICATIONS  (STANDING):  allopurinol 100 milliGRAM(s) Oral daily  aspirin  chewable 81 milliGRAM(s) Oral daily  atorvastatin 80 milliGRAM(s) Oral at bedtime  carvedilol 12.5 milliGRAM(s) Oral every 12 hours  clopidogrel Tablet 75 milliGRAM(s) Oral daily  heparin   Injectable 5000 Unit(s) SubCutaneous every 12 hours  senna 2 Tablet(s) Oral at bedtime  tamsulosin 0.4 milliGRAM(s) Oral at bedtime  valsartan 40 milliGRAM(s) Oral daily      Allergies    No Known Drug Allergies  shellfish (Unknown)    Intolerances        SOCIAL HISTORY:  Denies alcohol abuse, drug abuse or tobacco usage.     FAMILY HISTORY:  Family history of cardiac disorder in father (Father)        VITALS:  T(C): 37.9 (05-23-23 @ 09:15), Max: 37.9 (05-23-23 @ 09:15)  HR: 79 (05-23-23 @ 09:15) (78 - 79)  BP: 136/67 (05-23-23 @ 09:15) (136/67 - 145/88)  RR: 21 (05-23-23 @ 09:15) (21 - 22)  SpO2: 100% (05-23-23 @ 09:15) (89% - 100%)    REVIEW OF SYSTEMS:  Denies any nausea, vomiting, diarrhea, fever or chills. Denies chest pain, SOB, focal weakness, hematuria or dysuria. Good oral intake and denies fatigue or weakness. All other pertinent systems are reviewed and are negative.    PHYSICAL EXAM:  Constitutional: NAD  HEENT: EOMI  Neck:  No JVD, supple   Respiratory: CTA B/L  Cardiovascular: S1 and S2, RRR  Gastrointestinal: + BS, soft, NT, ND  Extremities: No peripheral edema, + peripheral pulses  Neurological: A/O x 3, CN2-12 intact  Psychiatric: Normal mood, normal affect  : No Jay  Skin: No rashes, C/D/I  Access: Not applicable    I and O's:    Height (cm): 172.7 (05-23 @ 08:53)  Weight (kg): 81.6 (05-23 @ 08:53)  BMI (kg/m2): 27.4 (05-23 @ 08:53)  BSA (m2): 1.95 (05-23 @ 08:53)    LABS:            URINE:      RADIOLOGY & ADDITIONAL STUDIES:

## 2023-05-23 NOTE — CONSULT NOTE ADULT - ASSESSMENT
Patient is a 63y male with a past history of ESRD on HD via left arm AVF, CAD with stents, CHF, T2DM , chronic left heel wound who was admitted 5/23 with 1 day of fever and chills. He denies any GI or  symptoms. He has no clear respiratory symptoms. No one ill at home, no access issues, he received cephalexin for left heal wound in March via Podiatry. He follows with podiatry on outside.  He has received 1 dose of vanco and CXR has opacities at RLL field, CT planned.His RVP is negative.  He looks very washed out in appearance, I am concerned by his abnormal CXR.  Suggest:  1. Await blood cultures   2. Non contrast chest CT  3. CTX and doxy pending CT scan Patient is a 63y male with a past history of ESRD on HD via left arm AVF, CAD with stents, CHF, T2DM , chronic left heel wound who was admitted 5/23 with 1 day of fever and chills. He denies any GI or  symptoms. He has no clear respiratory symptoms. No one ill at home, no access issues, he received cephalexin for left heal wound in March via Podiatry. He follows with podiatry on outside.  He has received 1 dose of vanco and CXR has opacities at RLL field, CT planned.His RVP is negative.  He looks very washed out in appearance, I am concerned by his abnormal CXR.  Suggest:  1. Await blood cultures   2. Non contrast chest CT  3. CTX and doxy pending CT scan  4. favor Podiatry f/u although left heal wound does not appear infected and prior imaging negative for osteo.

## 2023-05-23 NOTE — CONSULT NOTE ADULT - NS ATTEND AMEND GEN_ALL_CORE FT
Chart, labs, vitals, radiology reviewed. Above H&P reviewed and edited where appropriate. Agree with history and physical exam. Agree with assessment and plan. I reviewed the overnight course of events and discussed the care with the patient/ family.  All the decisions in assessment and plan are solely made by me

## 2023-05-23 NOTE — ED ADULT NURSE REASSESSMENT NOTE - NS ED NURSE REASSESS COMMENT FT1
Report received from SELVIN Keller. Pt a&ox2, oriented to self and place, in no acute distress at this time. Patient safety and comfort measures maintained.

## 2023-05-23 NOTE — CONSULT NOTE ADULT - PROBLEM SELECTOR RECOMMENDATION 9
Will start low dose basal insulin Lantus 8 units at bed time.  Continue Admelog correction scale coverage. Low scale  Will continue monitoring FS, log, and glucose trends, will Follow up.  Patient counseled for compliance with consistent low carb diet and exercise as tolerated outpatient.

## 2023-05-23 NOTE — PHYSICAL THERAPY INITIAL EVALUATION ADULT - HEALTH SCREEN CRITERIA
-- DO NOT REPLY / DO NOT REPLY ALL --  -- Message is from Engagement Center Operations ([facility]O) --    General Patient Message: Patient is returning call from Trisha for colonoscopy scheduling, please call back    Caller Information       Type Contact Phone/Fax    05/23/2023 12:08 PM CDT Phone (Incoming) Luis Alaln (Self) [phone] (M)        Alternative phone number: NA    Can a detailed message be left? Yes    Message Turnaround: [place]:    Refer to site's KB page for routing instructions    Please give this turnaround time to the caller:   \"You can expect to receive a response 2-3 business days after your provider's clinical team reviews the message\"               yes

## 2023-05-23 NOTE — CONSULT NOTE ADULT - ASSESSMENT
63    yr      hx CAD   s/p 2 drug eluding stents 11/2022       CHF, DM, gout, ESRD on dialysis      c/c  weakness  of   limbs/  neuro dr mccain     has  functional  quadriplegia/  CT  head, . old  arachnoid  cyst/  no intervention       h/o   gout, right  wrist, on  prednisone/ colchicine      prior  MRI  head/  C  spine., old  infarcts     s/p falls , in the past ,  CT head  12/2022, R lateral convexity 1.3 cm SDH extending into interhemispheric fissure.      pt  with  h/o intermittent  right  arm /leg weakness  has been   c/c  for  yrs/  with  unclear  etiology     mri head/  c spine in 10/22,  no cord  compression     seen by neuro in  past,   per  wife,  thinks  pt  has  a psychological  component  regarding  his  weakness          *  now admitted  with fevers/    ams/  metabolic   encephalopathy   form ?  sepsis   on arrival         ?  source         on rocephin/  iv  vanco       ID eval       cxr.  prelim, chf  ct ehad, pending      *     CAD,    s/p    pci. on  asa/ plavix/   lipitor,           h/o cardiomyopathy.  ef  40     *    CKD,              renal  dr dickerson    *    c/c  anemia     *    DM,  follow  fs              on lantus/  known to  nika talley    *     gout        lab s pending/  discussed with roseann boo attending                           63    yr      hx CAD   s/p 2 drug eluding stents 11/2022       CHF, DM, gout, ESRD on dialysis      c/c  weakness  of   limbs/  neuro dr mccain     has  functional  quadriplegia/  CT  head, . old  arachnoid  cyst/  no intervention       h/o   gout, right  wrist, on  prednisone/ colchicine      prior  MRI  head/  C  spine., old  infarcts     s/p falls , in the past ,  CT head  12/2022, R lateral convexity 1.3 cm SDH extending into interhemispheric fissure.      pt  with  h/o intermittent  right  arm /leg weakness  has been   c/c  for  yrs/  with  unclear  etiology     mri head/  c spine in 10/22,  no cord  compression     seen by neuro in  past,   per  wife,  thinks  pt  has  a psychological  component  regarding  his  weakness          *  now admitted  with fevers/    ams/  metabolic   encephalopathy   form ?  sepsis   on arrival      ?  source /  rvp pending     iv  vanco   defer  ab to    ID  d r brieff   cxr.  prelim, chf  ct head,  pending      CAD,    s/p    pci. on  asa/ plavix/   lipitor,     h/o cardiomyopathy.  ef  40      CKD,          renal  dr dickerson      c/c  anemia     DM,  follow  fs           on lantus/  known to  nika talley      gout        lab s pending/  discussed with er attending

## 2023-05-23 NOTE — H&P ADULT - HISTORY OF PRESENT ILLNESS
Date of Service, 05-23-23 @ 10:13  CHIEF COMPLAINT:Patient is a 63y old  Male who presents with a chief complaint of fevers (23 May 2023 10:07)      HPI:  62yo M, hx of ESRD on HD MWF, CAD s/p stents, CHF, DM, presents with generalized weakness for 1 day. Per wife, patient seemed weak before dialysis yesterday but worsened after dialysis. Unable to stand up, very sleepy, poor appetite, seemed confused. Also with fever    PAST MEDICAL & SURGICAL HISTORY:  Diabetes Mellitus Type II, Uncontrolled  Dyslipidemia  s/p Angioplasty with Stent  HTN (hypertension)  Gout  Diabetic retinopathy  GERD (gastroesophageal reflux disease)  Nephrolithiasis  Vitamin D deficiency  Hepatitis  CKD (chronic kidney disease)  Ischemic cardiomyopathy  ASHD (arteriosclerotic heart disease)  Pulmonary hypertension  Myocardial infarction  CAD (coronary artery disease)  BPH (benign prostatic hyperplasia)  Retinal Hemorrhage  S/P coronary artery stent placement  2010 TRICIA to Diag ; 11/18/2010  LAD; 2/4/11 ATOM liberte Diag; 5/15/2017  TRICIA to 1st diag; 6/7/17 PCI with laser and high pressure balloon  History of eye surgery  left eye  H/O lithotripsy  Diabetes with retinopathy  S/P PPV/PRP/GAS  OD 2/22/17 for viterous hemorrhage  CHF with cardiomyopathy  Insertion of Cardiomems monitor  Stented coronary artery    MEDICATIONS  (STANDING):  valsartan 40 mg oral tablet: 1 tab(s) orally once a day  · 	carvedilol 12.5 mg oral tablet: 1 tab(s) orally every 12 hours  · 	cephalexin 500 mg oral capsule: 1 cap(s) orally once a day  · 	Toujeo SoloStar 300 units/mL subcutaneous solution: 15 unit(s) subcutaneous once a day (at bedtime) as per blood sugar level  · 	Santyl 250 units/g topical ointment: Apply topically to affected area (left heel) once a day  · 	lidocaine 4% topical film: Apply 1 patch topically to affected area 3 times a week before dialysis (MWF), remove after 12 hours  · 	allopurinol 100 mg oral tablet: 1 tab(s) orally once a day (at bedtime)  · 	folic acid 1 mg oral tablet: 1 tab(s) orally once a day  · 	ascorbic acid 500 mg oral tablet: 1 tab(s) orally once a day  · 	sevelamer carbonate 800 mg oral tablet: 1 tab(s) orally 3 times a day (with meals)  · 	senna leaf extract oral tablet: 2 tab(s) orally once a day (at bedtime)  · 	famotidine 10 mg oral tablet: 1 tab(s) orally once a day  · 	melatonin 3 mg oral tablet: 1 tab(s) orally once a day (at bedtime)  · 	clopidogrel 75 mg oral tablet: 1 tab(s) orally once a day  · 	aspirin 81 mg oral tablet, chewable: 1 tab(s) orally once a day  · 	atorvastatin 80 mg oral tablet: 1 tab(s) orally once a day (at bedtime)  · 	pregabalin 75 mg oral capsule: 1 cap(s) orally once a day (at bedtime)  · 	acetaminophen 325 mg oral tablet: 1-2 tab(s) orally every 6 hours, As Needed  · 	tamsulosin 0.4 mg oral capsule: 1 cap(s) orally once a day (at bedtime)  MEDICATIONS  (PRN):      FAMILY HISTORY:  Family history of cardiac disorder in father (Father)        SOCIAL HISTORY:    [ x] Non-smoker  [ ] Smoker  [ ] Alcohol    Allergies    No Known Drug Allergies  shellfish (Unknown)    Intolerances    	    REVIEW OF SYSTEMS:  CONSTITUTIONAL: No fever, weight loss, or fatigue  EYES: No eye pain, visual disturbances, or discharge  ENT:  No difficulty hearing, tinnitus, vertigo; No sinus or throat pain  NECK: No pain or stiffness  RESPIRATORY: No cough, wheezing, chills or hemoptysis; + mild  Shortness of Breath  CARDIOVASCULAR: No chest pain, palpitations, passing out, dizziness, or leg swelling  GASTROINTESTINAL: No abdominal or epigastric pain. No nausea, vomiting, or hematemesis; No diarrhea or constipation. No melena or hematochezia.  GENITOURINARY: No dysuria, frequency, hematuria, or incontinence  NEUROLOGICAL: No headaches, memory loss, loss of strength, numbness, or tremors  SKIN: No itching, burning, rashes, or lesions   LYMPH Nodes: No enlarged glands  ENDOCRINE: No heat or cold intolerance; No hair loss  MUSCULOSKELETAL: No joint pain or swelling; No muscle, back, or extremity pain  PSYCHIATRIC: No depression, anxiety, mood swings, or difficulty sleeping  HEME/LYMPH: No easy bruising, or bleeding gums  ALLERGY AND IMMUNOLOGIC: No hives or eczema	    [ x] All others negative	  [ ] Unable to obtain    PHYSICAL EXAM:  T(C): 37.9 (05-23-23 @ 09:15), Max: 37.9 (05-23-23 @ 09:15)  HR: 79 (05-23-23 @ 09:15) (78 - 79)  BP: 136/67 (05-23-23 @ 09:15) (136/67 - 145/88)  RR: 21 (05-23-23 @ 09:15) (21 - 22)  SpO2: 100% (05-23-23 @ 09:15) (89% - 100%)  Wt(kg): --  I&O's Summary      Appearance: Normal	  HEENT:   Normal oral mucosa, PERRL, EOMI	  Lymphatic: No lymphadenopathy  Cardiovascular: Normal S1 S2, No JVD, + murmurs, No edema  Respiratory: Lungs clear to auscultation	  Psychiatry: A & O x 3, Mood & affect appropriate  Gastrointestinal:  Soft, Non-tender, + BS	  Skin: No rashes, No ecchymoses, No cyanosis	  Neurologic: Non-focal  Extremities: Normal range of motion, + pvd  Vascular: +pvd    TELEMETRY: 	    ECG:  	  RADIOLOGY:  OTHER: 	  	  LABS:	 	    CARDIAC MARKERS:    proBNP:   Lipid Profile:   HgA1c:   TSH:       PREVIOUS DIAGNOSTIC TESTING:      < from: 12 Lead ECG (03.14.23 @ 13:00) >  Diagnosis Line NORMAL SINUS RHYTHM  T WAVE ABNORMALITY, CONSIDER LATERAL ISCHEMIA  PROLONGED QT  ABNORMAL ECG  WHEN COMPARED WITH ECG OF 29-DEC-2022 10:00,  NO SIGNIFICANT CHANGE WAS FOUND    < from: Xray Chest 1 View- PORTABLE-Urgent (03.14.23 @ 13:46) >  IMPRESSION:  Bilateral lower lobe patchy opacities, which may be atelectasis or   infectious.

## 2023-05-23 NOTE — CONSULT NOTE ADULT - ASSESSMENT
64yo M, hx of ESRD on HD MWF, CAD s/p stents, CHF, DM, presents with generalized weakness for 1 day  PNA    1 Renal - Next sched HD in am   2 ID-IV abx and possible CT of chest to define the infiltrate  Cont supplemental oxygen   3 CVS-Not in heart failure     Sayed [facility]   [phone]

## 2023-05-23 NOTE — ED PROVIDER NOTE - PROGRESS NOTE DETAILS
Attending MD Wood: Spoke with Dr. Wood, patient to be admitted to Dr. Leonard as is his office patient, reports nephro aware, will admit now.  Admitting team to follow pending CT. Attending MD Wood: Rectal temp 101.3, Vanco ordered, LR 500cc (given ESRD), admitted

## 2023-05-23 NOTE — H&P ADULT - NSHPLABSRESULTS_GEN_ALL_CORE
< from: 12 Lead ECG (05.23.23 @ 09:03) >    Diagnosis Line SINUS RHYTHM WITH PREMATURE ATRIAL COMPLEXES  POSSIBLE ANTERIOR INFARCT , AGE UNDETERMINED  ABNORMAL ECG  WHEN COMPARED WITH ECG OF 14-MAR-2023 13:00,  SIGNIFICANT CHANGES HAVE OCCURRED  new APCs    < from: CT Head No Cont (05.23.23 @ 10:54) >    No hydrocephalus, acute intracranial hemorrhage, mass effect, or brain   edema.      1. Await blood cultures   2. Non contrast chest CT  3. CTX and doxy pending CT scan  4. favor Podiatry f/u although left heal wound does not appear infected and prior imaging negative for osteo.    Troponin T, High Sensitivity (05.23.23 @ 18:11)    Troponin T, High Sensitivity Result: 131: Specimen not hemolyzed  *  *  Rapid upward or downward changes in high-sensitivity troponin levels  suggest acute myocardial injury. Renal impairment may cause sustained  troponin elevations.  Normal: <6 - 14 ng/L  Indeterminate: 15-51 ng/L  Elevated: > 51 ng/L  See http://labs/test/TROPTHS on the [facility] intranet for more  information ng/L

## 2023-05-23 NOTE — ED PROVIDER NOTE - ATTENDING CONTRIBUTION TO CARE
Attending MD Wood: I personally have seen and examined this patient.  Resident note reviewed and agree on plan of care and except where noted.  See below for details.     seen in [facility] Attending MD Wood: I personally have seen and examined this patient.  Resident note reviewed and agree on plan of care and except where noted.  See below for details.     seen in [facility]    63M with PMH/PSH including ESRD on HD MWF (reports full HD on Monday), CAD s/p stents, CHF, cardiomyopathy, PVD, DM presents to the ED with weakness.  As per EMS, patient brought in by EMS for generalized weakness since last night, intermittent confusion.  EMS reported that patient was sat'ing in low 90s, placed on O2 by EMS.  EMS , here 244.  Patient reporting weakness.  Reports wear a diaper because sometime cannot make it to the bathroom quickly enough because needs assistance from wife.  Via phone, wife, Lucía, reported decreased appetite, weakness worse after HD yesterday, unable to stand, confusion.  Wife reported T 98.4, afebrile at home.  Patient denies chest pain, shortness of breath, abdominal pain, nausea, vomiting, bloody, black stools, diarrhea, dysuria, hematuria.  Baseline ambulation with rolling walker.    Exam:   General: NAD, AAOx2 (name, place, time stated as January 2008)  HENT: head NCAT, airway patent  Eyes: anicteric, no conjunctival injection   Lungs: lungs scattered crackles R base. no wheezing, no rhonchi, sat'ing in low 90s on RA with drops into high 80s, placed on 3L with improvement to mid 90s  Cardiac: +S1S2, no obvious r/g, LUE AVF with bruit, thrill  GI: abdomen soft with +BS, NT, ND  MSK: ranging neck and extremities freely, no meningismus  Neuro: moving all extremities spontaneously, nonfocal  Psych: normal mood and affect     A/P: 63M with increased weakness, intermittent confusion, will evaluate for occult infection such as UTI, PNA, viral URI, will obtain labs, blood cultures, UA, UrCx, will give gentle IVFs given ESRD and history of CHF, will give IV abx, will keep on O2, cardiac monitor/pulse ox, will need admission

## 2023-05-23 NOTE — H&P ADULT - ASSESSMENT
62yo M, hx of ESRD on HD MWF, CAD s/p stents, CHF, DM, presents with generalized weakness for 1 day. Per wife, patient seemed weak before dialysis yesterday but worsened after dialysis. Unable to stand up, very sleepy, poor appetite, seemed confused. Also with fever.  pt with sig cardiac hx , chf, cad s/p multiple stents , ESRD on HD , with fever   64yo M, hx of ESRD on HD MWF, CAD s/p stents, CHF, DM, presents with generalized weakness for 1 day. Per wife, patient seemed weak before dialysis yesterday but worsened after dialysis. Unable to stand up, very sleepy, poor appetite, seemed confused. Also with fever.  pt with sig cardiac hx , chf, cad s/p multiple stents , ESRD on HD , with generalized weakness r/o sepsis  ID eval appreciated  check cultures  abx as per ID  pt with hx of ashd, ischemic cardiomyopathy s/p stents continue all cardiac meds  asa/plavix  increase trop doubt cardiac pt with no cardiac complain ?increase sec to renal failure  ESRD renl eval  dvt prophylaxis  chest x ray, will consider ct  endocrine eval

## 2023-05-23 NOTE — ED PROVIDER NOTE - PHYSICAL EXAMINATION
Physical Exam:  Gen: NAD, AOx2, non-toxic appearing  Head: NCAT  HEENT: EOMI, PEERLA, normal conjunctiva, tongue midline, oral mucosa moist  Lung: CTAB, no respiratory distress, no wheezes/rhonchi/rales B/L, speaking in full sentences  CV: RRR, no murmurs, rubs or gallops  Abd: soft, NT, ND, no guarding, no rigidity, no rebound tenderness, no CVA tenderness   MSK: no visible deformities, ROM normal in UE/LE, no back pain  Neuro: No focal sensory or motor deficits  Skin: Warm, well perfused, no rash, no leg swelling  Psych: normal affect, calm

## 2023-05-23 NOTE — ED ADULT NURSE NOTE - NSFALLRISKINTERV_ED_ALL_ED

## 2023-05-23 NOTE — ED ADULT NURSE NOTE - OBJECTIVE STATEMENT
63Y male, A&Ox2, PMH ESRD on HD MWF (reports full HD on Monday), CAD s/p stents, CHF, cardiomyopathy, PVD, DM. pt presents to the ED c/o weakness and intermittent confusion.  As per EMS, patient brought in by EMS for generalized weakness since last night, intermittent confusion.  EMS reported that patient was sat'ing in low 90s, placed on O2 by EMS.  EMS , here 244.  Patient reporting weakness.  Reports wears a diaper because sometime cannot make it to the bathroom quickly enough because needs assistance from wife.  Via phone, wife, Lucía, reported decreased appetite, weakness worse after HD yesterday, unable to stand, confusion.  Wife reported T 98.4, afebrile at home.  Patient denies chest pain, shortness of breath, abdominal pain, nausea, vomiting, bloody, black stools, diarrhea, dysuria, hematuria.  Baseline ambulation with rolling walker.

## 2023-05-23 NOTE — CONSULT NOTE ADULT - SUBJECTIVE AND OBJECTIVE BOX
HPI:   Patient is a 63y male with a past history of ESRD on HD via left arm AVF, CAD with stents, CHF, T2DM , chronic left heel wound who was admitted 5/23 with 1 day of fever and chills. He denies any GI or  symptoms. He has no clear respiratory symptoms. No one ill at home, no access issues, he received cephalexin for left heal wound in March via Podiatry. He follows with podiatry on outside.  He has received 1 dose of vanco and CXR has opacities at [facility], CT planned.    REVIEW OF SYSTEMS:  All other review of systems negative (Comprehensive ROS)    PAST MEDICAL & SURGICAL HISTORY:  Diabetes Mellitus Type II, Uncontrolled      Dyslipidemia      s/p Angioplasty with Stent      HTN (hypertension)      Gout      Diabetic retinopathy      GERD (gastroesophageal reflux disease)      Nephrolithiasis      Vitamin D deficiency      Hepatitis      CKD (chronic kidney disease)      Ischemic cardiomyopathy      ASHD (arteriosclerotic heart disease)      Pulmonary hypertension      Myocardial infarction      CAD (coronary artery disease)      BPH (benign prostatic hyperplasia)      Retinal Hemorrhage      S/P coronary artery stent placement  2010 TRICIA to Diag ; 11/18/2010  LAD; 2/4/11 ATOM liberte Diag; 5/15/2017  TRICIA to 1st diag; 6/7/17 PCI with laser and high pressure balloon      History of eye surgery  left eye      H/O lithotripsy      Diabetes with retinopathy  S/P PPV/PRP/GAS  OD 2/22/17 for viterous hemorrhage      CHF with cardiomyopathy  Insertion of Cardiomems monitor      Stented coronary artery          Allergies    No Known Drug Allergies  shellfish (Unknown)    Intolerances        Antimicrobials Day #  :    Other Medications:  allopurinol 100 milliGRAM(s) Oral daily  aspirin  chewable 81 milliGRAM(s) Oral daily  atorvastatin 80 milliGRAM(s) Oral at bedtime  carvedilol 12.5 milliGRAM(s) Oral every 12 hours  clopidogrel Tablet 75 milliGRAM(s) Oral daily  heparin   Injectable 5000 Unit(s) SubCutaneous every 12 hours  insulin lispro (ADMELOG) corrective regimen sliding scale   SubCutaneous three times a day before meals  senna 2 Tablet(s) Oral at bedtime  tamsulosin 0.4 milliGRAM(s) Oral at bedtime  valsartan 40 milliGRAM(s) Oral daily      FAMILY HISTORY:  Family history of cardiac disorder in father (Father)        SOCIAL HISTORY:  Smoking: x  ETOH:   x  Drug Use: x        T(F): 100.3 (05-23-23 @ 09:15), Max: 100.3 (05-23-23 @ 09:15)  HR: 79 (05-23-23 @ 09:15)  BP: 136/67 (05-23-23 @ 09:15)  RR: 21 (05-23-23 @ 09:15)  SpO2: 100% (05-23-23 @ 09:15)  Wt(kg): --    PHYSICAL EXAM:  General: alert, no acute distress  Eyes:  anicteric, no conjunctival injection, no discharge  Oropharynx: no lesions or injection 	  Neck: supple, without adenopathy  Lungs: clear to auscultation, decreased at rt base  Heart: regular rate and rhythm; no murmur, rubs or gallops  Abdomen: soft, nondistended, nontender, without mass or organomegaly  Skin: no rash  Extremities: no clubbing, cyanosis, or edema  Neurologic: alert, oriented, moves all extremities  Left heel with dry skin and clean ulcer, no cellulitis  LAB RESULTS:                        12.0   10.69 )-----------( 126      ( 23 May 2023 10:12 )             37.7     05-23    138  |  98  |  49<H>  ----------------------------<  241<H>  3.3<L>   |  24  |  4.09<H>    Ca    8.1<L>      23 May 2023 10:12    TPro  6.3  /  Alb  3.1<L>  /  TBili  0.8  /  DBili  x   /  AST  9<L>  /  ALT  9<L>  /  AlkPhos  87  05-23    LIVER FUNCTIONS - ( 23 May 2023 10:12 )  Alb: 3.1 g/dL / Pro: 6.3 g/dL / ALK PHOS: 87 U/L / ALT: 9 U/L / AST: 9 U/L / GGT: x               MICROBIOLOGY:  RECENT CULTURES:        RADIOLOGY REVIEWED:  < from: CT Head No Cont (05.23.23 @ 10:54) >  IMPRESSION:    No hydrocephalus, acute intracranial hemorrhage, mass effect, or brain   edema.    --- End of Report ---    < end of copied text >  < from: Xray Chest 1 View- PORTABLE-Urgent (05.23.23 @ 09:51) >  IMPRESSION:    Increased right lung opacities, possibly pneumonia.    --- End of Report ---    < end of copied text >

## 2023-05-23 NOTE — CONSULT NOTE ADULT - SUBJECTIVE AND OBJECTIVE BOX
HPII  62yo M,        hx of ESRD on HD MWF,    CAD s/p stents, CHF, DM,       presents with generalized weakness for 1 day.       Per wife, patient seemed weak before dialysis yesterday but worsened after dialysis.     Unable to stand up, very sleepy, poor appetite    was  febrile  in  er   no abd pain/  cp    Date of service: 05-23-23 @ 10:08    REVIEW OF SYSTEMS:  CONSTITUTIONAL:  +  fever,  /no  weight loss  ENT:  No  tinnitus,   no   vertigo  NECK: No pain or stiffness  RESPIRATORY: No cough, wheezing, chills or hemoptysis;    No Shortness of Breath  CARDIOVASCULAR: No chest pain, palpitations, dizziness  GASTROINTESTINAL: No abdominal or epigastric pain. No nausea, vomiting, or hematemesis; No diarrhea  No melena or hematochezia.  GENITOURINARY: No dysuria, frequency, hematuria, or incontinence  NEUROLOGICAL: No headaches  SKIN: No itching,  no   rash  LYMPH Nodes: No enlarged glands  ENDOCRINE: No heat or cold intolerance  MUSCULOSKELETAL: No joint pain or swelling  PSYCHIATRIC: No depression, anxiety  HEME/LYMPH: No easy bruising, or bleeding gums  ALLERGY AND IMMUNOLOGIC: No hives or eczema	    Allergies    No Known Drug Allergies  shellfish (Unknown)    Intolerances        CAPILLARY BLOOD GLUCOSE      POCT Blood Glucose.: 244 mg/dL (23 May 2023 09:12)    I&O's Summary      Vital Signs Last 24 Hrs  T(C): 37.9 (23 May 2023 09:15), Max: 37.9 (23 May 2023 09:15)  T(F): 100.3 (23 May 2023 09:15), Max: 100.3 (23 May 2023 09:15)  HR: 79 (23 May 2023 09:15) (78 - 79)  BP: 136/67 (23 May 2023 09:15) (136/67 - 145/88)  BP(mean): --  RR: 21 (23 May 2023 09:15) (21 - 22)  SpO2: 100% (23 May 2023 09:15) (89% - 100%)    Parameters below as of 23 May 2023 09:15  Patient On (Oxygen Delivery Method): nasal cannula  O2 Flow (L/min): 2      PHYSICAL EXAM:  Appearance: Normal	  HEENT:   Normal oral mucosa, PERRL, EOMI	  Lymphatic: No lymphadenopathy  Cardiovascular: Normal S1 S2, No JVD  Respiratory: Lungs clear to auscultation	  Psychiatry:    Mood & affect appropriate  Gastrointestinal:  Soft, Non-tender, + BS	  Skin: No rash, No ecchymoses	  Extremities: Normal range of motion  Vascular: Peripheral pulses palpable bilaterally    MEDICATIONS  (STANDING):    MEDICATIONS  (PRN):    LABS:                                  Consultant(s) Notes Reviewed:      Care Discussed with Consultants/Other Providers:  Plan:

## 2023-05-23 NOTE — ED PROVIDER NOTE - OBJECTIVE STATEMENT
64yo M, hx of ESRD on HD MWF, CAD s/p stents, CHF, DM, presents with generalized weakness for 1 day. Per wife, patient seemed weak before dialysis yesterday but worsened after dialysis. Unable to stand up, very sleepy, poor appetite, seemed confused. Call 62yo M, hx of ESRD on HD MWF, CAD s/p stents, CHF, DM, presents with generalized weakness for 1 day. Per wife, patient seemed weak before dialysis yesterday but worsened after dialysis. Unable to stand up, very sleepy, poor appetite, seemed confused. Also with fever.

## 2023-05-23 NOTE — CONSULT NOTE ADULT - SUBJECTIVE AND OBJECTIVE BOX
HPI:  62yo M, hx of ESRD on HD MWF, CAD s/p stents, CHF, DM, presents with generalized weakness for 1 day. Per wife, patient seemed weak before dialysis yesterday but worsened after dialysis. Unable to stand up, very sleepy, poor appetite, seemed confused. Also with fever      Patient has history of diabetes, A1C : 7.0 % (03-15-23) on insulin at home.   Endo was consulted for glycemic control.      PAST MEDICAL & SURGICAL HISTORY:  Diabetes Mellitus Type II, Uncontrolled      Dyslipidemia      s/p Angioplasty with Stent      HTN (hypertension)      Gout      Diabetic retinopathy      GERD (gastroesophageal reflux disease)      Nephrolithiasis      Vitamin D deficiency      Hepatitis      CKD (chronic kidney disease)      Ischemic cardiomyopathy      ASHD (arteriosclerotic heart disease)      Pulmonary hypertension      Myocardial infarction      CAD (coronary artery disease)      BPH (benign prostatic hyperplasia)      Retinal Hemorrhage      S/P coronary artery stent placement  2010 TRICIA to Diag ; 11/18/2010  LAD; 2/4/11 ATOM liberte Diag; 5/15/2017  TRICIA to 1st diag; 6/7/17 PCI with laser and high pressure balloon      History of eye surgery  left eye      H/O lithotripsy      Diabetes with retinopathy  S/P PPV/PRP/GAS  OD 2/22/17 for viterous hemorrhage      CHF with cardiomyopathy  Insertion of Cardiomems monitor      Stented coronary artery          FAMILY HISTORY:  Family history of cardiac disorder in father (Father)        Social History:            HOME MEDICATIONS:  Home Medications:  acetaminophen 325 mg oral tablet: 1-2 tab(s) orally every 6 hours, As Needed (14 Mar 2023 20:09)  allopurinol 100 mg oral tablet: 1 tab(s) orally once a day (at bedtime) (14 Mar 2023 20:09)  aspirin 81 mg oral tablet, chewable: 1 tab(s) orally once a day (14 Mar 2023 20:09)  atorvastatin 80 mg oral tablet: 1 tab(s) orally once a day (at bedtime) (14 Mar 2023 20:09)  clopidogrel 75 mg oral tablet: 1 tab(s) orally once a day (14 Mar 2023 20:09)  melatonin 3 mg oral tablet: 1 tab(s) orally once a day (at bedtime) (14 Mar 2023 20:09)  pregabalin 75 mg oral capsule: 1 cap(s) orally once a day (at bedtime) (14 Mar 2023 20:09)  Santyl 250 units/g topical ointment: Apply topically to affected area (left heel) once a day (14 Mar 2023 20:09)  senna leaf extract oral tablet: 2 tab(s) orally once a day (at bedtime) (14 Mar 2023 20:09)  sevelamer carbonate 800 mg oral tablet: 1 tab(s) orally 3 times a day (with meals) (14 Mar 2023 20:09)  tamsulosin 0.4 mg oral capsule: 1 cap(s) orally once a day (at bedtime) (14 Mar 2023 20:09)  Toujeo SoloStar 300 units/mL subcutaneous solution: 15 unit(s) subcutaneous once a day (at bedtime) as per blood sugar level (14 Mar 2023 20:08)            MEDICATIONS  (STANDING):  allopurinol 100 milliGRAM(s) Oral daily  aspirin  chewable 81 milliGRAM(s) Oral daily  atorvastatin 80 milliGRAM(s) Oral at bedtime  carvedilol 12.5 milliGRAM(s) Oral every 12 hours  clopidogrel Tablet 75 milliGRAM(s) Oral daily  heparin   Injectable 5000 Unit(s) SubCutaneous every 12 hours  insulin lispro (ADMELOG) corrective regimen sliding scale   SubCutaneous three times a day before meals  senna 2 Tablet(s) Oral at bedtime  tamsulosin 0.4 milliGRAM(s) Oral at bedtime  valsartan 40 milliGRAM(s) Oral daily    MEDICATIONS  (PRN):      Allergies    No Known Drug Allergies  shellfish (Unknown)    Intolerances        Review of Systems:  Neuro: No HA, no dizziness  Cardiovascular: No chest pain, no palpitations  Respiratory: no SOB, no cough  GI: No nausea, vomiting, abdominal pain  MSK: Denies joint/muscle pain      ALL OTHER SYSTEMS REVIEWED AND NEGATIVE        PHYSICAL EXAM:  VITALS: T(C): 37.9 (05-23-23 @ 09:15)  T(F): 100.3 (05-23-23 @ 09:15), Max: 100.3 (05-23-23 @ 09:15)  HR: 79 (05-23-23 @ 09:15) (78 - 79)  BP: 136/67 (05-23-23 @ 09:15) (136/67 - 145/88)  RR:  (21 - 22)  SpO2:  (89% - 100%)  Wt(kg): --  GENERAL: NAD, well-groomed, well-developed  NEURO:  alert and oriented  RESPIRATORY: Clear to auscultation bilaterally; No rales, rhonchi, wheezing  CARDIOVASCULAR: Si S2  GI: Soft, non distended, normal bowel sounds  MUSCULOSKELETAL: Moves all extremities equally       POCT Blood Glucose.: 244 mg/dL (05-23-23 @ 09:12)                            12.0   10.69 )-----------( 126      ( 23 May 2023 10:12 )             37.7       05-23    138  |  98  |  49<H>  ----------------------------<  241<H>  3.3<L>   |  24  |  4.09<H>    eGFR: 16<L>    Ca    8.1<L>      05-23    TPro  6.3  /  Alb  3.1<L>  /  TBili  0.8  /  DBili  x   /  AST  9<L>  /  ALT  9<L>  /  AlkPhos  87  05-23      Thyroid Function Tests:    Diet, Renal Restrictions:   For patients receiving Renal Replacement - No Protein Restr, No Conc K, No Conc Phos, Low Sodium  Consistent Carbohydrate Evening Snack (CSTCHOSN) (05-23-23 @ 12:09) [Active]          A1C with Estimated Average Glucose Result: 7.0 % (03-15-23 @ 07:27)  A1C with Estimated Average Glucose Result: 7.9 % (01-01-23 @ 06:15)  A1C with Estimated Average Glucose Result: 11.8 % (10-23-22 @ 14:08)                       HPI:  62yo M, hx of ESRD on HD MWF, CAD s/p stents, CHF, DM, presents with generalized weakness for 1 day. Per wife, patient seemed weak before dialysis yesterday but worsened after  dialysis. Unable to stand up, very sleepy, poor appetite, seemed confused. Also with fever      Patient has history of diabetes, A1C : 7.0 % (03-15-23) on insulin at home.   Endo was consulted for glycemic control.      PAST MEDICAL & SURGICAL HISTORY:  Diabetes Mellitus Type II, Uncontrolled      Dyslipidemia      s/p Angioplasty with Stent      HTN (hypertension)      Gout      Diabetic retinopathy      GERD (gastroesophageal reflux disease)      Nephrolithiasis      Vitamin D deficiency      Hepatitis      CKD (chronic kidney disease)      Ischemic cardiomyopathy      ASHD (arteriosclerotic heart disease)      Pulmonary hypertension      Myocardial infarction      CAD (coronary artery disease)      BPH (benign prostatic hyperplasia)      Retinal Hemorrhage      S/P coronary artery stent placement  2010 TRICIA to Diag ; 11/18/2010  LAD; 2/4/11 ATOM liberte Diag; 5/15/2017  TRICIA to 1st diag; 6/7/17 PCI with laser and high pressure balloon      History of eye surgery  left eye      H/O lithotripsy      Diabetes with retinopathy  S/P PPV/PRP/GAS  OD 2/22/17 for viterous hemorrhage      CHF with cardiomyopathy  Insertion of Cardiomems monitor      Stented coronary artery          FAMILY HISTORY:  Family history of cardiac disorder in father (Father)        Social History:            HOME MEDICATIONS:  Home Medications:  acetaminophen 325 mg oral tablet: 1-2 tab(s) orally every 6 hours, As Needed (14 Mar 2023 20:09)  allopurinol 100 mg oral tablet: 1 tab(s) orally once a day (at bedtime) (14 Mar 2023 20:09)  aspirin 81 mg oral tablet, chewable: 1 tab(s) orally once a day (14 Mar 2023 20:09)  atorvastatin 80 mg oral tablet: 1 tab(s) orally once a day (at bedtime) (14 Mar 2023 20:09)  clopidogrel 75 mg oral tablet: 1 tab(s) orally once a day (14 Mar 2023 20:09)  melatonin 3 mg oral tablet: 1 tab(s) orally once a day (at bedtime) (14 Mar 2023 20:09)  pregabalin 75 mg oral capsule: 1 cap(s) orally once a day (at bedtime) (14 Mar 2023 20:09)  Santyl 250 units/g topical ointment: Apply topically to affected area (left heel) once a day (14 Mar 2023 20:09)  senna leaf extract oral tablet: 2 tab(s) orally once a day (at bedtime) (14 Mar 2023 20:09)  sevelamer carbonate 800 mg oral tablet: 1 tab(s) orally 3 times a day (with meals) (14 Mar 2023 20:09)  tamsulosin 0.4 mg oral capsule: 1 cap(s) orally once a day (at bedtime) (14 Mar 2023 20:09)  Toujeo SoloStar 300 units/mL subcutaneous solution: 15 unit(s) subcutaneous once a day (at bedtime) as per blood sugar level (14 Mar 2023 20:08)            MEDICATIONS  (STANDING):  allopurinol 100 milliGRAM(s) Oral daily  aspirin  chewable 81 milliGRAM(s) Oral daily  atorvastatin 80 milliGRAM(s) Oral at bedtime  carvedilol 12.5 milliGRAM(s) Oral every 12 hours  clopidogrel Tablet 75 milliGRAM(s) Oral daily  heparin   Injectable 5000 Unit(s) SubCutaneous every 12 hours  insulin lispro (ADMELOG) corrective regimen sliding scale   SubCutaneous three times a day before meals  senna 2 Tablet(s) Oral at bedtime  tamsulosin 0.4 milliGRAM(s) Oral at bedtime  valsartan 40 milliGRAM(s) Oral daily    MEDICATIONS  (PRN):      Allergies    No Known Drug Allergies  shellfish (Unknown)    Intolerances        Review of Systems:  Neuro: No HA, no dizziness  Cardiovascular: No chest pain, no palpitations  Respiratory: no SOB, no cough  GI: No nausea, vomiting, abdominal pain  MSK: Denies joint/muscle pain      ALL OTHER SYSTEMS REVIEWED AND NEGATIVE        PHYSICAL EXAM:  VITALS: T(C): 37.9 (05-23-23 @ 09:15)  T(F): 100.3 (05-23-23 @ 09:15), Max: 100.3 (05-23-23 @ 09:15)  HR: 79 (05-23-23 @ 09:15) (78 - 79)  BP: 136/67 (05-23-23 @ 09:15) (136/67 - 145/88)  RR:  (21 - 22)  SpO2:  (89% - 100%)  Wt(kg): --  GENERAL: NAD, well-groomed, well-developed  NEURO:  alert and oriented  RESPIRATORY: Clear to auscultation bilaterally; No rales, rhonchi, wheezing  CARDIOVASCULAR: Si S2  GI: Soft, non distended, normal bowel sounds  MUSCULOSKELETAL: Moves all extremities equally       POCT Blood Glucose.: 244 mg/dL (05-23-23 @ 09:12)                            12.0   10.69 )-----------( 126      ( 23 May 2023 10:12 )             37.7       05-23    138  |  98  |  49<H>  ----------------------------<  241<H>  3.3<L>   |  24  |  4.09<H>    eGFR: 16<L>    Ca    8.1<L>      05-23    TPro  6.3  /  Alb  3.1<L>  /  TBili  0.8  /  DBili  x   /  AST  9<L>  /  ALT  9<L>  /  AlkPhos  87  05-23      Thyroid Function Tests:    Diet, Renal Restrictions:   For patients receiving Renal Replacement - No Protein Restr, No Conc K, No Conc Phos, Low Sodium  Consistent Carbohydrate Evening Snack (CSTCHOSN) (05-23-23 @ 12:09) [Active]          A1C with Estimated Average Glucose Result: 7.0 % (03-15-23 @ 07:27)  A1C with Estimated Average Glucose Result: 7.9 % (01-01-23 @ 06:15)  A1C with Estimated Average Glucose Result: 11.8 % (10-23-22 @ 14:08)

## 2023-05-24 DIAGNOSIS — E11.65 TYPE 2 DIABETES MELLITUS WITH HYPERGLYCEMIA: ICD-10-CM

## 2023-05-24 DIAGNOSIS — E78.5 HYPERLIPIDEMIA, UNSPECIFIED: ICD-10-CM

## 2023-05-24 LAB
ANION GAP SERPL CALC-SCNC: 18 MMOL/L — HIGH (ref 5–17)
BUN SERPL-MCNC: 58 MG/DL — HIGH (ref 7–23)
CALCIUM SERPL-MCNC: 8.2 MG/DL — LOW (ref 8.4–10.5)
CHLORIDE SERPL-SCNC: 97 MMOL/L — SIGNIFICANT CHANGE UP (ref 96–108)
CO2 SERPL-SCNC: 24 MMOL/L — SIGNIFICANT CHANGE UP (ref 22–31)
CREAT SERPL-MCNC: 5.19 MG/DL — HIGH (ref 0.5–1.3)
EGFR: 12 ML/MIN/1.73M2 — LOW
GLUCOSE BLDC GLUCOMTR-MCNC: 130 MG/DL — HIGH (ref 70–99)
GLUCOSE BLDC GLUCOMTR-MCNC: 144 MG/DL — HIGH (ref 70–99)
GLUCOSE BLDC GLUCOMTR-MCNC: 217 MG/DL — HIGH (ref 70–99)
GLUCOSE SERPL-MCNC: 111 MG/DL — HIGH (ref 70–99)
GRAM STN FLD: SIGNIFICANT CHANGE UP
HCT VFR BLD CALC: 31.5 % — LOW (ref 39–50)
HGB BLD-MCNC: 9.5 G/DL — LOW (ref 13–17)
MCHC RBC-ENTMCNC: 27.4 PG — SIGNIFICANT CHANGE UP (ref 27–34)
MCHC RBC-ENTMCNC: 30.2 GM/DL — LOW (ref 32–36)
MCV RBC AUTO: 90.8 FL — SIGNIFICANT CHANGE UP (ref 80–100)
METHOD TYPE: SIGNIFICANT CHANGE UP
NRBC # BLD: 0 /100 WBCS — SIGNIFICANT CHANGE UP (ref 0–0)
PLATELET # BLD AUTO: 375 K/UL — SIGNIFICANT CHANGE UP (ref 150–400)
POTASSIUM SERPL-MCNC: 3.2 MMOL/L — LOW (ref 3.5–5.3)
POTASSIUM SERPL-SCNC: 3.2 MMOL/L — LOW (ref 3.5–5.3)
RBC # BLD: 3.47 M/UL — LOW (ref 4.2–5.8)
RBC # FLD: 15.8 % — HIGH (ref 10.3–14.5)
S PNEUM DNA BLD POS QL NAA+NON-PROBE: SIGNIFICANT CHANGE UP
SODIUM SERPL-SCNC: 139 MMOL/L — SIGNIFICANT CHANGE UP (ref 135–145)
SPECIMEN SOURCE: SIGNIFICANT CHANGE UP
SPECIMEN SOURCE: SIGNIFICANT CHANGE UP
T4 FREE SERPL-MCNC: 1.4 NG/DL — SIGNIFICANT CHANGE UP (ref 0.9–1.8)
TROPONIN T, HIGH SENSITIVITY RESULT: 144 NG/L — HIGH (ref 0–51)
TSH SERPL-MCNC: 0.84 UIU/ML — SIGNIFICANT CHANGE UP (ref 0.27–4.2)
VANCOMYCIN FLD-MCNC: 10.2 UG/ML — SIGNIFICANT CHANGE UP
WBC # BLD: 7.81 K/UL — SIGNIFICANT CHANGE UP (ref 3.8–10.5)
WBC # FLD AUTO: 7.81 K/UL — SIGNIFICANT CHANGE UP (ref 3.8–10.5)

## 2023-05-24 PROCEDURE — 93306 TTE W/DOPPLER COMPLETE: CPT | Mod: 26

## 2023-05-24 PROCEDURE — 71250 CT THORAX DX C-: CPT | Mod: 26

## 2023-05-24 PROCEDURE — 99254 IP/OBS CNSLTJ NEW/EST MOD 60: CPT

## 2023-05-24 RX ORDER — INSULIN LISPRO 100/ML
3 VIAL (ML) SUBCUTANEOUS
Refills: 0 | Status: DISCONTINUED | OUTPATIENT
Start: 2023-05-24 | End: 2023-05-28

## 2023-05-24 RX ORDER — CEFTRIAXONE 500 MG/1
2000 INJECTION, POWDER, FOR SOLUTION INTRAMUSCULAR; INTRAVENOUS EVERY 24 HOURS
Refills: 0 | Status: DISCONTINUED | OUTPATIENT
Start: 2023-05-24 | End: 2023-05-28

## 2023-05-24 RX ORDER — INSULIN GLARGINE 100 [IU]/ML
10 INJECTION, SOLUTION SUBCUTANEOUS AT BEDTIME
Refills: 0 | Status: DISCONTINUED | OUTPATIENT
Start: 2023-05-24 | End: 2023-05-28

## 2023-05-24 RX ADMIN — CLOPIDOGREL BISULFATE 75 MILLIGRAM(S): 75 TABLET, FILM COATED ORAL at 11:28

## 2023-05-24 RX ADMIN — Medication 2: at 11:40

## 2023-05-24 RX ADMIN — VALSARTAN 40 MILLIGRAM(S): 80 TABLET ORAL at 05:18

## 2023-05-24 RX ADMIN — Medication 100 MILLIGRAM(S): at 11:29

## 2023-05-24 RX ADMIN — HEPARIN SODIUM 5000 UNIT(S): 5000 INJECTION INTRAVENOUS; SUBCUTANEOUS at 05:17

## 2023-05-24 RX ADMIN — INSULIN GLARGINE 10 UNIT(S): 100 INJECTION, SOLUTION SUBCUTANEOUS at 22:38

## 2023-05-24 RX ADMIN — CARVEDILOL PHOSPHATE 12.5 MILLIGRAM(S): 80 CAPSULE, EXTENDED RELEASE ORAL at 21:52

## 2023-05-24 RX ADMIN — TAMSULOSIN HYDROCHLORIDE 0.4 MILLIGRAM(S): 0.4 CAPSULE ORAL at 21:52

## 2023-05-24 RX ADMIN — Medication 81 MILLIGRAM(S): at 11:31

## 2023-05-24 RX ADMIN — SENNA PLUS 2 TABLET(S): 8.6 TABLET ORAL at 21:52

## 2023-05-24 RX ADMIN — ATORVASTATIN CALCIUM 80 MILLIGRAM(S): 80 TABLET, FILM COATED ORAL at 21:53

## 2023-05-24 RX ADMIN — CARVEDILOL PHOSPHATE 12.5 MILLIGRAM(S): 80 CAPSULE, EXTENDED RELEASE ORAL at 05:18

## 2023-05-24 RX ADMIN — Medication 100 MILLIGRAM(S): at 05:18

## 2023-05-24 RX ADMIN — HEPARIN SODIUM 5000 UNIT(S): 5000 INJECTION INTRAVENOUS; SUBCUTANEOUS at 21:54

## 2023-05-24 NOTE — CONSULT NOTE ADULT - ASSESSMENT
63-year-old man with history of poorly controlled type 2 diabetes, complicated by end-stage renal disease on hemodialysis Monday Wednesday Friday, CAD status post stents, CHF, presenting with pneumonia.    1.  Type 2 diabetes  A1c likely inaccurate in the setting of CKD.  At the most clinic visit, downloaded his patience sensor which is more consistent with an A1c greater than 10%.  Dietary indiscretion was his most deterrent of good glycemic control.  While inpatient, patient is on a much lower dose of insulin and his glucose is well controlled.  Recommend Lantus 10 units at bedtime  Recommend Admelog 5 units 3 times daily with meals  Recommend low-dose sliding scale before every meal and at bedtime.  Consider psychiatry consult as patient appears to be very depressed secondary to his health and that has been impacting his diabetes care and general health.    2.  Hypertension  BP goal <130/80  Management as per nephrology.  Patient is currently on valsartan 40 mg once daily and carvedilol 12.5 mg every 12 hours.    3.  Hyperlipidemia  LDL goal <55 mg/dL  Continue with high intensity statin, currently taking atorvastatin 80 mg once daily at bedtime.  Check fasting lipid profile once annually. 63-year-old man with history of poorly controlled type 2 diabetes, complicated by end-stage renal disease on hemodialysis Monday Wednesday Friday, CAD status post stents, CHF, presenting with pneumonia.    1.  Type 2 diabetes  A1c likely inaccurate in the setting of CKD.  At the most clinic visit, downloaded his patience sensor which is more consistent with an A1c greater than 10%.  Dietary indiscretion was his most deterrent of good glycemic control.  While inpatient, patient is on a much lower dose of insulin and his glucose is well controlled.  Recommend Lantus 10 units at bedtime  Recommend Admelog 3 units 3 times daily with meals  Recommend low-dose sliding scale before every meal and at bedtime.    Patient with poor appetite   Consider psychiatry consult and social work consult as patient appears to be very depressed secondary to his health and that has been impacting his diabetes care and general health.    2.  Hypertension  BP goal <130/80  Management as per nephrology.  Patient is currently on valsartan 40 mg once daily and carvedilol 12.5 mg every 12 hours.    3.  Hyperlipidemia  LDL goal <55 mg/dL  Continue with high intensity statin, currently taking atorvastatin 80 mg once daily at bedtime.  Check fasting lipid profile once annually.

## 2023-05-24 NOTE — PROGRESS NOTE ADULT - SUBJECTIVE AND OBJECTIVE BOX
CC: f/u for    Patient reports    REVIEW OF SYSTEMS: no fevers, no chills, no nausea, no vomiting, no abd pain, no resp symptoms  All other review of systems negative (Comprehensive ROS)    Antimicrobials Day #    cefTRIAXone   IVPB 1000 milliGRAM(s) IV Intermittent every 24 hours  doxycycline monohydrate Capsule 100 milliGRAM(s) Oral every 12 hours    Other Medications Reviewed    T(F): 97.4 (05-24-23 @ 11:33), Max: 100.6 (05-23-23 @ 17:15)  HR: 81 (05-24-23 @ 11:33)  BP: 155/82 (05-24-23 @ 11:33)  RR: 18 (05-24-23 @ 11:33)  SpO2: 100% (05-24-23 @ 11:33)    PHYSICAL EXAM:  General: alert, no acute distress  Eyes:  anicteric, no conjunctival injection, no discharge  Oropharynx: no lesions or injection 	  Neck: supple, without adenopathy  Lungs: clear to auscultation  Heart: regular rate and rhythm; no murmur, rubs or gallops  Abdomen: soft, nondistended, nontender, without mass or organomegaly  Skin: no lesions  Extremities: no clubbing, cyanosis, or edema  Neurologic: alert, oriented, moves all extremities    LAB RESULTS:                        9.5    7.81  )-----------( 375      ( 24 May 2023 07:28 )             31.5     05-24    139  |  97  |  58<H>  ----------------------------<  111<H>  3.2<L>   |  24  |  5.19<H>    Ca    8.2<L>      24 May 2023 07:28    TPro  6.3  /  Alb  3.1<L>  /  TBili  0.8  /  DBili  x   /  AST  9<L>  /  ALT  9<L>  /  AlkPhos  87  05-23    LIVER FUNCTIONS - ( 23 May 2023 10:12 )  Alb: 3.1 g/dL / Pro: 6.3 g/dL / ALK PHOS: 87 U/L / ALT: 9 U/L / AST: 9 U/L / GGT: x               MICROBIOLOGY REVIEWED:    RADIOLOGY REVIEWED:     CC: f/u for strep pneumo bacteremia    Patient reports no new c/o, feels better today    REVIEW OF SYSTEMS: no fevers, no chills, no nausea, no vomiting, no abd pain, no resp symptoms  All other review of systems negative (Comprehensive ROS)    Antimicrobials Day #    cefTRIAXone   IVPB 1000 milliGRAM(s) IV Intermittent every 24 hours  doxycycline monohydrate Capsule 100 milliGRAM(s) Oral every 12 hours    Other Medications Reviewed    T(F): 97.4 (05-24-23 @ 11:33), Max: 100.6 (05-23-23 @ 17:15)  HR: 81 (05-24-23 @ 11:33)  BP: 155/82 (05-24-23 @ 11:33)  RR: 18 (05-24-23 @ 11:33)  SpO2: 100% (05-24-23 @ 11:33)    PHYSICAL EXAM:  General: alert, no acute distress  Eyes:  anicteric, no conjunctival injection, no discharge  Oropharynx: no lesions or injection 	  Neck: supple, without adenopathy  Lungs: clear to auscultation  Heart: regular rate and rhythm; no murmur, rubs or gallops  Abdomen: soft, distended, nontender, without mass or organomegaly  Skin: no lesions  Extremities: no clubbing, cyanosis, or edema  L arm fistula   Neurologic: alert, oriented, moves all extremities    LAB RESULTS:                        9.5    7.81  )-----------( 375      ( 24 May 2023 07:28 )             31.5     05-24    139  |  97  |  58<H>  ----------------------------<  111<H>  3.2<L>   |  24  |  5.19<H>    Ca    8.2<L>      24 May 2023 07:28    TPro  6.3  /  Alb  3.1<L>  /  TBili  0.8  /  DBili  x   /  AST  9<L>  /  ALT  9<L>  /  AlkPhos  87  05-23    LIVER FUNCTIONS - ( 23 May 2023 10:12 )  Alb: 3.1 g/dL / Pro: 6.3 g/dL / ALK PHOS: 87 U/L / ALT: 9 U/L / AST: 9 U/L / GGT: x               MICROBIOLOGY REVIEWED:  Culture - Blood (05.23.23 @ 09:45)   - Streptococcus pneumoniae: Detec  Gram Stain:   Growth in aerobic bottle: Gram Positive Cocci in Pairs and Chains   Growth in anaerobic bottle: Gram Positive Cocci in Pairs and Chains  Specimen Source: .Blood Blood-Peripheral  Organism: Blood Culture PCR  Culture Results:   Growth in anaerobic bottle: Gram Positive Cocci in Pairs and Chains   Growth in aerobic bottle: Gram Positive Cocci in Pairs and Chains   ***Blood Panel PCR results on this specimen are available   approximately 3 hours after the Gram stain result.***   Gram stain, PCR, and/or culture results may not always   correspond due to difference in methodologies.   RADIOLOGY REVIEWED:    < from: CT Chest No Cont (05.24.23 @ 08:48) >    IMPRESSION:    Findings suggestive of pulmonary edema.    --- End of Report ---    < end of copied text >

## 2023-05-24 NOTE — PROVIDER CONTACT NOTE (CRITICAL VALUE NOTIFICATION) - ACTION/TREATMENT ORDERED:
ACP Shaun Walsh notified. Continue IV antibiotics. Pending vancomycin random lab in am. ACP Shaun Walsh notified. Continue IV antibiotics. Pending vancomycin random & blood cultures lab draws in am.

## 2023-05-24 NOTE — CONSULT NOTE ADULT - SUBJECTIVE AND OBJECTIVE BOX
HPI:  Date of Service, 05-23-23 @ 10:13  CHIEF COMPLAINT:Patient is a 63y old  Male who presents with a chief complaint of fevers (23 May 2023 10:07)      HPI:  62yo M, hx of ESRD on HD MWF, CAD s/p stents, CHF, DM, presents with generalized weakness for 1 day. Per wife, patient seemed weak before dialysis yesterday but worsened after dialysis. Unable to stand up, very sleepy, poor appetite, seemed confused. Also with fever    PAST MEDICAL & SURGICAL HISTORY:  Diabetes Mellitus Type II, Uncontrolled  Dyslipidemia  s/p Angioplasty with Stent  HTN (hypertension)  Gout  Diabetic retinopathy  GERD (gastroesophageal reflux disease)  Nephrolithiasis  Vitamin D deficiency  Hepatitis  CKD (chronic kidney disease)  Ischemic cardiomyopathy  ASHD (arteriosclerotic heart disease)  Pulmonary hypertension  Myocardial infarction  CAD (coronary artery disease)  BPH (benign prostatic hyperplasia)  Retinal Hemorrhage  S/P coronary artery stent placement  2010 TRICIA to Diag ; 11/18/2010  LAD; 2/4/11 ATOM liberte Diag; 5/15/2017  TRICIA to 1st diag; 6/7/17 PCI with laser and high pressure balloon  History of eye surgery  left eye  H/O lithotripsy  Diabetes with retinopathy  S/P PPV/PRP/GAS  OD 2/22/17 for viterous hemorrhage  CHF with cardiomyopathy  Insertion of Cardiomems monitor  Stented coronary artery    MEDICATIONS  (STANDING):  valsartan 40 mg oral tablet: 1 tab(s) orally once a day  · 	carvedilol 12.5 mg oral tablet: 1 tab(s) orally every 12 hours  · 	cephalexin 500 mg oral capsule: 1 cap(s) orally once a day  · 	Toujeo SoloStar 300 units/mL subcutaneous solution: 15 unit(s) subcutaneous once a day (at bedtime) as per blood sugar level  · 	Santyl 250 units/g topical ointment: Apply topically to affected area (left heel) once a day  · 	lidocaine 4% topical film: Apply 1 patch topically to affected area 3 times a week before dialysis (MWF), remove after 12 hours  · 	allopurinol 100 mg oral tablet: 1 tab(s) orally once a day (at bedtime)  · 	folic acid 1 mg oral tablet: 1 tab(s) orally once a day  · 	ascorbic acid 500 mg oral tablet: 1 tab(s) orally once a day  · 	sevelamer carbonate 800 mg oral tablet: 1 tab(s) orally 3 times a day (with meals)  · 	senna leaf extract oral tablet: 2 tab(s) orally once a day (at bedtime)  · 	famotidine 10 mg oral tablet: 1 tab(s) orally once a day  · 	melatonin 3 mg oral tablet: 1 tab(s) orally once a day (at bedtime)  · 	clopidogrel 75 mg oral tablet: 1 tab(s) orally once a day  · 	aspirin 81 mg oral tablet, chewable: 1 tab(s) orally once a day  · 	atorvastatin 80 mg oral tablet: 1 tab(s) orally once a day (at bedtime)  · 	pregabalin 75 mg oral capsule: 1 cap(s) orally once a day (at bedtime)  · 	acetaminophen 325 mg oral tablet: 1-2 tab(s) orally every 6 hours, As Needed  · 	tamsulosin 0.4 mg oral capsule: 1 cap(s) orally once a day (at bedtime)  MEDICATIONS  (PRN):      FAMILY HISTORY:  Family history of cardiac disorder in father (Father)        SOCIAL HISTORY:    [ x] Non-smoker  [ ] Smoker  [ ] Alcohol    Allergies    No Known Drug Allergies  shellfish (Unknown)    Intolerances    	    REVIEW OF SYSTEMS:  CONSTITUTIONAL: No fever, weight loss, or fatigue  EYES: No eye pain, visual disturbances, or discharge  ENT:  No difficulty hearing, tinnitus, vertigo; No sinus or throat pain  NECK: No pain or stiffness  RESPIRATORY: No cough, wheezing, chills or hemoptysis; + mild  Shortness of Breath  CARDIOVASCULAR: No chest pain, palpitations, passing out, dizziness, or leg swelling  GASTROINTESTINAL: No abdominal or epigastric pain. No nausea, vomiting, or hematemesis; No diarrhea or constipation. No melena or hematochezia.  GENITOURINARY: No dysuria, frequency, hematuria, or incontinence  NEUROLOGICAL: No headaches, memory loss, loss of strength, numbness, or tremors  SKIN: No itching, burning, rashes, or lesions   LYMPH Nodes: No enlarged glands  ENDOCRINE: No heat or cold intolerance; No hair loss  MUSCULOSKELETAL: No joint pain or swelling; No muscle, back, or extremity pain  PSYCHIATRIC: No depression, anxiety, mood swings, or difficulty sleeping  HEME/LYMPH: No easy bruising, or bleeding gums  ALLERGY AND IMMUNOLOGIC: No hives or eczema	    [ x] All others negative	  [ ] Unable to obtain    PHYSICAL EXAM:  T(C): 37.9 (05-23-23 @ 09:15), Max: 37.9 (05-23-23 @ 09:15)  HR: 79 (05-23-23 @ 09:15) (78 - 79)  BP: 136/67 (05-23-23 @ 09:15) (136/67 - 145/88)  RR: 21 (05-23-23 @ 09:15) (21 - 22)  SpO2: 100% (05-23-23 @ 09:15) (89% - 100%)  Wt(kg): --  I&O's Summary      Appearance: Normal	  HEENT:   Normal oral mucosa, PERRL, EOMI	  Lymphatic: No lymphadenopathy  Cardiovascular: Normal S1 S2, No JVD, + murmurs, No edema  Respiratory: Lungs clear to auscultation	  Psychiatry: A & O x 3, Mood & affect appropriate  Gastrointestinal:  Soft, Non-tender, + BS	  Skin: No rashes, No ecchymoses, No cyanosis	  Neurologic: Non-focal  Extremities: Normal range of motion, + pvd  Vascular: +pvd    TELEMETRY: 	    ECG:  	  RADIOLOGY:  OTHER: 	  	  LABS:	 	    CARDIAC MARKERS:    proBNP:   Lipid Profile:   HgA1c:   TSH:       PREVIOUS DIAGNOSTIC TESTING:      < from: 12 Lead ECG (03.14.23 @ 13:00) >  Diagnosis Line NORMAL SINUS RHYTHM  T WAVE ABNORMALITY, CONSIDER LATERAL ISCHEMIA  PROLONGED QT  ABNORMAL ECG  WHEN COMPARED WITH ECG OF 29-DEC-2022 10:00,  NO SIGNIFICANT CHANGE WAS FOUND    < from: Xray Chest 1 View- PORTABLE-Urgent (03.14.23 @ 13:46) >  IMPRESSION:  Bilateral lower lobe patchy opacities, which may be atelectasis or   infectious.             (23 May 2023 10:13)      PAST MEDICAL & SURGICAL HISTORY:  Diabetes Mellitus Type II, Uncontrolled  Dyslipidemia  s/p Angioplasty with Stent  HTN (hypertension)  Gout  Diabetic retinopathy  GERD (gastroesophageal reflux disease)  Nephrolithiasis  Vitamin D deficiency  Hepatitis  CKD (chronic kidney disease)  Ischemic cardiomyopathy  ASHD (arteriosclerotic heart disease)  Pulmonary hypertension  Myocardial infarction  CAD (coronary artery disease)  BPH (benign prostatic hyperplasia)  Retinal Hemorrhage  S/P coronary artery stent placement  2010 TRICIA to Diag ; 11/18/2010  LAD; 2/4/11 ATOM liberte Diag; 5/15/2017  TRICIA to 1st diag; 6/7/17 PCI with laser and high pressure balloon  History of eye surgery  left eye  H/O lithotripsy  Diabetes with retinopathy  S/P PPV/PRP/GAS  OD 2/22/17 for viterous hemorrhage  CHF with cardiomyopathy  Insertion of Cardiomems monitor  Stented coronary artery    FAMILY HISTORY:  Family history of cardiac disorder in father (Father)    Social History:    Home Medications:  acetaminophen 325 mg oral tablet: 1-2 tab(s) orally every 6 hours, As Needed (14 Mar 2023 20:09)  allopurinol 100 mg oral tablet: 1 tab(s) orally once a day (at bedtime) (14 Mar 2023 20:09)  aspirin 81 mg oral tablet, chewable: 1 tab(s) orally once a day (14 Mar 2023 20:09)  atorvastatin 80 mg oral tablet: 1 tab(s) orally once a day (at bedtime) (14 Mar 2023 20:09)  clopidogrel 75 mg oral tablet: 1 tab(s) orally once a day (14 Mar 2023 20:09)  melatonin 3 mg oral tablet: 1 tab(s) orally once a day (at bedtime) (14 Mar 2023 20:09)  pregabalin 75 mg oral capsule: 1 cap(s) orally once a day (at bedtime) (14 Mar 2023 20:09)  Santyl 250 units/g topical ointment: Apply topically to affected area (left heel) once a day (14 Mar 2023 20:09)  senna leaf extract oral tablet: 2 tab(s) orally once a day (at bedtime) (14 Mar 2023 20:09)  sevelamer carbonate 800 mg oral tablet: 1 tab(s) orally 3 times a day (with meals) (14 Mar 2023 20:09)  tamsulosin 0.4 mg oral capsule: 1 cap(s) orally once a day (at bedtime) (14 Mar 2023 20:09)  Toujeo SoloStar 300 units/mL subcutaneous solution: 15 unit(s) subcutaneous once a day (at bedtime) as per blood sugar level (14 Mar 2023 20:08)        MEDICATIONS  (STANDING):  acetaminophen   IVPB .. 1000 milliGRAM(s) IV Intermittent once  allopurinol 100 milliGRAM(s) Oral daily  aspirin  chewable 81 milliGRAM(s) Oral daily  atorvastatin 80 milliGRAM(s) Oral at bedtime  carvedilol 12.5 milliGRAM(s) Oral every 12 hours  cefTRIAXone   IVPB 2000 milliGRAM(s) IV Intermittent every 24 hours  clopidogrel Tablet 75 milliGRAM(s) Oral daily  heparin   Injectable 5000 Unit(s) SubCutaneous every 12 hours  insulin glargine Injectable (LANTUS) 8 Unit(s) SubCutaneous at bedtime  insulin lispro (ADMELOG) corrective regimen sliding scale   SubCutaneous three times a day before meals  senna 2 Tablet(s) Oral at bedtime  tamsulosin 0.4 milliGRAM(s) Oral at bedtime  valsartan 40 milliGRAM(s) Oral daily    MEDICATIONS  (PRN):      Allergies    No Known Drug Allergies  shellfish (Unknown)    Intolerances        allopurinol   100 milliGRAM(s) Oral (05-24-23 @ 11:29)   100 milliGRAM(s) Oral (05-23-23 @ 19:04)    atorvastatin   80 milliGRAM(s) Oral (05-23-23 @ 22:37)    insulin glargine Injectable (LANTUS)   8 Unit(s) SubCutaneous (05-23-23 @ 22:38)    insulin lispro (ADMELOG) corrective regimen sliding scale   2  SubCutaneous (05-24-23 @ 11:40)   2 Unit(s) SubCutaneous (05-23-23 @ 20:13)        Review of Systems:  Constitutional: No fever  Eyes: No blurry vision  Neuro: No tremors  HEENT: No pain  Cardiovascular: No chest pain, palpitations  Respiratory: No SOB, no cough  GI: No nausea, vomiting, abdominal pain  : No dysuria  Skin: no rash  Psych: no depression  Endocrine: no polyuria, polydipsia  Hem/lymph: no swelling  Osteoporosis: no fractures    ALL OTHER SYSTEMS REVIEWED AND NEGATIVE    UNABLE TO OBTAIN    PHYSICAL EXAM:  -----------------------------  VITALS: T(C): 36.3 (05-24-23 @ 11:33)  T(F): 97.4 (05-24-23 @ 11:33), Max: 100.6 (05-23-23 @ 17:15)  HR: 81 (05-24-23 @ 11:33) (79 - 87)  BP: 155/82 (05-24-23 @ 11:33) (135/74 - 169/97)  RR:  (18 - 20)  SpO2:  (98% - 100%)  Wt(kg): --  GENERAL: NAD, well-groomed, well-developed  EYES: No proptosis, no lid lag, anicteric  HEENT:  Atraumatic, Normocephalic, moist mucous membranes  THYROID: Normal size, no palpable nodules  RESPIRATORY: Clear to auscultation bilaterally; No rales, rhonchi, wheezing, or rubs  CARDIOVASCULAR: Regular rate and rhythm; No murmurs; no peripheral edema  GI: Soft, nontender, non distended, normal bowel sounds  SKIN: Dry, intact, No rashes or lesions  MUSCULOSKELETAL: Full range of motion, normal strength  NEURO: sensation intact, extraocular movements intact, no tremor, normal reflexes  PSYCH: Alert and oriented x 3, normal affect, normal mood  CUSHING'S SIGNS: no striae    POCT Blood Glucose.: 217 mg/dL (05-24-23 @ 11:32)  POCT Blood Glucose.: 130 mg/dL (05-24-23 @ 07:35)  POCT Blood Glucose.: 141 mg/dL (05-23-23 @ 22:32)  POCT Blood Glucose.: 205 mg/dL (05-23-23 @ 20:11)  POCT Blood Glucose.: 211 mg/dL (05-23-23 @ 19:07)  POCT Blood Glucose.: 244 mg/dL (05-23-23 @ 09:12)                            9.5    7.81  )-----------( 375      ( 24 May 2023 07:28 )             31.5       05-24    139  |  97  |  58<H>  ----------------------------<  111<H>  3.2<L>   |  24  |  5.19<H>    eGFR: 12<L>    Ca    8.2<L>      05-24    TPro  6.3  /  Alb  3.1<L>  /  TBili  0.8  /  DBili  x   /  AST  9<L>  /  ALT  9<L>  /  AlkPhos  87  05-23      Thyroid Function Tests:  05-24 @ 07:28 TSH 0.84 FreeT4 1.4 T3 -- Anti TPO -- Anti Thyroglobulin Ab -- TSI --      A1C with Estimated Average Glucose Result: 7.0 % (03-15-23 @ 07:27)  A1C with Estimated Average Glucose Result: 7.9 % (01-01-23 @ 06:15)  A1C with Estimated Average Glucose Result: 11.8 % (10-23-22 @ 14:08)          Radiology:   ------------------------               HPI:  Date of Service, 05-23-23 @ 10:13  CHIEF COMPLAINT:Patient is a 63y old  Male who presents with a chief complaint of fevers (23 May 2023 10:07)      HPI:  64yo M, hx of ESRD on HD MWF, CAD s/p stents, CHF, DM, presents with generalized weakness for 1 day. Per wife, patient seemed weak before dialysis yesterday but worsened after dialysis. Unable to stand up, very sleepy, poor appetite, seemed confused. Also with fever    Regarding type 2 diabetes, diagnosed many years ago.  Complicated by diabetic retinopathy, diabetic neuropathy and diabetic nephropathy.  Currently on hemodialysis.  Also complicated by history of CAD and CHF.    At home patient takes Toujeo 20 units at bedtime, NovoLog/Humalog 8 to 10 units 3 times daily with meals.  Patient's A1c was noted to be 7.6%, however, likely inaccurate secondary to history of end-stage renal disease.  Patient does not adhere to a carb consistent diet and has been an ongoing issue with this patient's diabetes care.    Regarding hyperlipidemia, patient is on atorvastatin 80 mg once daily at bedtime.    Regarding high blood pressure, managed by nephrology as well, currently on losartan 40 mg once daily, carvedilol 12.5 mg every 12 hours    PAST MEDICAL & SURGICAL HISTORY:  Diabetes Mellitus Type II, Uncontrolled  Dyslipidemia  s/p Angioplasty with Stent  HTN (hypertension)  Gout  Diabetic retinopathy  GERD (gastroesophageal reflux disease)  Nephrolithiasis  Vitamin D deficiency  Hepatitis  CKD (chronic kidney disease)  Ischemic cardiomyopathy  ASHD (arteriosclerotic heart disease)  Pulmonary hypertension  Myocardial infarction  CAD (coronary artery disease)  BPH (benign prostatic hyperplasia)  Retinal Hemorrhage  S/P coronary artery stent placement  2010 TRICIA to Diag ; 11/18/2010  LAD; 2/4/11 ATOM liberte Diag; 5/15/2017  TRICIA to 1st diag; 6/7/17 PCI with laser and high pressure balloon  History of eye surgery  left eye  H/O lithotripsy  Diabetes with retinopathy  S/P PPV/PRP/GAS  OD 2/22/17 for viterous hemorrhage  CHF with cardiomyopathy  Insertion of Cardiomems monitor  Stented coronary artery    FAMILY HISTORY:  Family history of cardiac disorder in father (Father)    Social History:    Home Medications:  acetaminophen 325 mg oral tablet: 1-2 tab(s) orally every 6 hours, As Needed (14 Mar 2023 20:09)  allopurinol 100 mg oral tablet: 1 tab(s) orally once a day (at bedtime) (14 Mar 2023 20:09)  aspirin 81 mg oral tablet, chewable: 1 tab(s) orally once a day (14 Mar 2023 20:09)  atorvastatin 80 mg oral tablet: 1 tab(s) orally once a day (at bedtime) (14 Mar 2023 20:09)  clopidogrel 75 mg oral tablet: 1 tab(s) orally once a day (14 Mar 2023 20:09)  melatonin 3 mg oral tablet: 1 tab(s) orally once a day (at bedtime) (14 Mar 2023 20:09)  pregabalin 75 mg oral capsule: 1 cap(s) orally once a day (at bedtime) (14 Mar 2023 20:09)  Santyl 250 units/g topical ointment: Apply topically to affected area (left heel) once a day (14 Mar 2023 20:09)  senna leaf extract oral tablet: 2 tab(s) orally once a day (at bedtime) (14 Mar 2023 20:09)  sevelamer carbonate 800 mg oral tablet: 1 tab(s) orally 3 times a day (with meals) (14 Mar 2023 20:09)  tamsulosin 0.4 mg oral capsule: 1 cap(s) orally once a day (at bedtime) (14 Mar 2023 20:09)  Toujeo SoloStar 300 units/mL subcutaneous solution: 15 unit(s) subcutaneous once a day (at bedtime) as per blood sugar level (14 Mar 2023 20:08)        MEDICATIONS  (STANDING):  acetaminophen   IVPB .. 1000 milliGRAM(s) IV Intermittent once  allopurinol 100 milliGRAM(s) Oral daily  aspirin  chewable 81 milliGRAM(s) Oral daily  atorvastatin 80 milliGRAM(s) Oral at bedtime  carvedilol 12.5 milliGRAM(s) Oral every 12 hours  cefTRIAXone   IVPB 2000 milliGRAM(s) IV Intermittent every 24 hours  clopidogrel Tablet 75 milliGRAM(s) Oral daily  heparin   Injectable 5000 Unit(s) SubCutaneous every 12 hours  insulin glargine Injectable (LANTUS) 8 Unit(s) SubCutaneous at bedtime  insulin lispro (ADMELOG) corrective regimen sliding scale   SubCutaneous three times a day before meals  senna 2 Tablet(s) Oral at bedtime  tamsulosin 0.4 milliGRAM(s) Oral at bedtime  valsartan 40 milliGRAM(s) Oral daily    MEDICATIONS  (PRN):      Allergies    No Known Drug Allergies  shellfish (Unknown)    Intolerances        allopurinol   100 milliGRAM(s) Oral (05-24-23 @ 11:29)   100 milliGRAM(s) Oral (05-23-23 @ 19:04)    atorvastatin   80 milliGRAM(s) Oral (05-23-23 @ 22:37)    insulin glargine Injectable (LANTUS)   8 Unit(s) SubCutaneous (05-23-23 @ 22:38)    insulin lispro (ADMELOG) corrective regimen sliding scale   2  SubCutaneous (05-24-23 @ 11:40)   2 Unit(s) SubCutaneous (05-23-23 @ 20:13)        Review of Systems:  Constitutional: No fever  Eyes: No blurry vision  Neuro: No tremors  HEENT: No pain  Cardiovascular: No chest pain, palpitations  Respiratory: No SOB, no cough  GI: No nausea, vomiting, abdominal pain  : No dysuria  Skin: no rash  Psych: no depression  Endocrine: no polyuria, polydipsia  Hem/lymph: no swelling  Osteoporosis: no fractures    ALL OTHER SYSTEMS REVIEWED AND NEGATIVE    UNABLE TO OBTAIN    PHYSICAL EXAM:  -----------------------------  VITALS: T(C): 36.3 (05-24-23 @ 11:33)  T(F): 97.4 (05-24-23 @ 11:33), Max: 100.6 (05-23-23 @ 17:15)  HR: 81 (05-24-23 @ 11:33) (79 - 87)  BP: 155/82 (05-24-23 @ 11:33) (135/74 - 169/97)  RR:  (18 - 20)  SpO2:  (98% - 100%)  Wt(kg): --  GENERAL: NAD, well-groomed, well-developed  EYES: No proptosis, no lid lag, anicteric  HEENT:  Atraumatic, Normocephalic, moist mucous membranes  THYROID: Normal size, no palpable nodules  RESPIRATORY: Clear to auscultation bilaterally; No rales, rhonchi, wheezing, or rubs  CARDIOVASCULAR: Regular rate and rhythm; No murmurs; no peripheral edema  GI: Soft, nontender, non distended, normal bowel sounds  SKIN: Dry, intact, No rashes or lesions  MUSCULOSKELETAL: Full range of motion, normal strength  NEURO: sensation intact, extraocular movements intact, no tremor, normal reflexes  PSYCH: Alert and oriented x 3, normal affect, normal mood  CUSHING'S SIGNS: no striae    POCT Blood Glucose.: 217 mg/dL (05-24-23 @ 11:32)  POCT Blood Glucose.: 130 mg/dL (05-24-23 @ 07:35)  POCT Blood Glucose.: 141 mg/dL (05-23-23 @ 22:32)  POCT Blood Glucose.: 205 mg/dL (05-23-23 @ 20:11)  POCT Blood Glucose.: 211 mg/dL (05-23-23 @ 19:07)  POCT Blood Glucose.: 244 mg/dL (05-23-23 @ 09:12)                            9.5    7.81  )-----------( 375      ( 24 May 2023 07:28 )             31.5       05-24    139  |  97  |  58<H>  ----------------------------<  111<H>  3.2<L>   |  24  |  5.19<H>    eGFR: 12<L>    Ca    8.2<L>      05-24    TPro  6.3  /  Alb  3.1<L>  /  TBili  0.8  /  DBili  x   /  AST  9<L>  /  ALT  9<L>  /  AlkPhos  87  05-23      Thyroid Function Tests:  05-24 @ 07:28 TSH 0.84 FreeT4 1.4 T3 -- Anti TPO -- Anti Thyroglobulin Ab -- TSI --      A1C with Estimated Average Glucose Result: 7.0 % (03-15-23 @ 07:27)  A1C with Estimated Average Glucose Result: 7.9 % (01-01-23 @ 06:15)  A1C with Estimated Average Glucose Result: 11.8 % (10-23-22 @ 14:08)          Radiology:   ------------------------

## 2023-05-24 NOTE — PROGRESS NOTE ADULT - SUBJECTIVE AND OBJECTIVE BOX
Date of Service: 05-24-23 @ 20:21           CARDIOLOGY     PROGRESS  NOTE   ________________________________________________    CHIEF COMPLAINT:Patient is a 63y old  Male who presents with a chief complaint of fever, r/o sepsis (24 May 2023 13:56)  no complain, doing better  	  REVIEW OF SYSTEMS:  CONSTITUTIONAL: No fever, weight loss, or fatigue  EYES: No eye pain, visual disturbances, or discharge  ENT:  No difficulty hearing, tinnitus, vertigo; No sinus or throat pain  NECK: No pain or stiffness  RESPIRATORY: No cough, wheezing, chills or hemoptysis; +mild  Shortness of Breath  CARDIOVASCULAR: No chest pain, palpitations, passing out, dizziness, or leg swelling  GASTROINTESTINAL: No abdominal or epigastric pain. No nausea, vomiting, or hematemesis; No diarrhea or constipation. No melena or hematochezia.  GENITOURINARY: No dysuria, frequency, hematuria, or incontinence  NEUROLOGICAL: No headaches, memory loss, loss of strength, numbness, or tremors  SKIN: No itching, burning, rashes, or lesions   LYMPH Nodes: No enlarged glands  ENDOCRINE: No heat or cold intolerance; No hair loss  MUSCULOSKELETAL: No joint pain or swelling; No muscle, back, or extremity pain  PSYCHIATRIC: No depression, anxiety, mood swings, or difficulty sleeping  HEME/LYMPH: No easy bruising, or bleeding gums  ALLERGY AND IMMUNOLOGIC: No hives or eczema	    [x ] All others negative	  [ ] Unable to obtain    PHYSICAL EXAM:  T(C): 36.2 (05-24-23 @ 16:40), Max: 37.3 (05-24-23 @ 16:10)  HR: 78 (05-24-23 @ 16:40) (78 - 81)  BP: 147/75 (05-24-23 @ 16:40) (135/74 - 169/97)  RR: 18 (05-24-23 @ 16:40) (18 - 18)  SpO2: 98% (05-24-23 @ 16:40) (98% - 100%)  Wt(kg): --  I&O's Summary      Appearance: Normal	  HEENT:   Normal oral mucosa, PERRL, EOMI	  Lymphatic: No lymphadenopathy  Cardiovascular: Normal S1 S2, No JVD, + murmurs, No edema  Respiratory:rhonchi  Psychiatry: A & O x 3, Mood & affect appropriate  Gastrointestinal:  Soft, Non-tender, + BS	  Skin: No rashes, No ecchymoses, No cyanosis	  Neurologic: Non-focal  Extremities: Normal range of motion, No clubbing, cyanosis or edema  Vascular: Peripheral pulses palpable 2+ bilaterally    MEDICATIONS  (STANDING):  acetaminophen   IVPB .. 1000 milliGRAM(s) IV Intermittent once  allopurinol 100 milliGRAM(s) Oral daily  aspirin  chewable 81 milliGRAM(s) Oral daily  atorvastatin 80 milliGRAM(s) Oral at bedtime  carvedilol 12.5 milliGRAM(s) Oral every 12 hours  cefTRIAXone   IVPB 2000 milliGRAM(s) IV Intermittent every 24 hours  clopidogrel Tablet 75 milliGRAM(s) Oral daily  heparin   Injectable 5000 Unit(s) SubCutaneous every 12 hours  insulin glargine Injectable (LANTUS) 10 Unit(s) SubCutaneous at bedtime  insulin lispro (ADMELOG) corrective regimen sliding scale   SubCutaneous three times a day before meals  insulin lispro Injectable (ADMELOG) 3 Unit(s) SubCutaneous three times a day before meals  senna 2 Tablet(s) Oral at bedtime  tamsulosin 0.4 milliGRAM(s) Oral at bedtime  valsartan 40 milliGRAM(s) Oral daily      TELEMETRY: 	    ECG:  	  RADIOLOGY:  OTHER: 	  	  LABS:	 	    CARDIAC MARKERS:  CARDIAC MARKERS ( 23 May 2023 18:11 )  x     / x     / 74 U/L / x     / <1.0 ng/mL                                9.5    7.81  )-----------( 375      ( 24 May 2023 07:28 )             31.5     05-24    139  |  97  |  58<H>  ----------------------------<  111<H>  3.2<L>   |  24  |  5.19<H>    Ca    8.2<L>      24 May 2023 07:28    TPro  6.3  /  Alb  3.1<L>  /  TBili  0.8  /  DBili  x   /  AST  9<L>  /  ALT  9<L>  /  AlkPhos  87  05-23    proBNP:   Lipid Profile:   HgA1c:   TSH: Thyroid Stimulating Hormone, Serum: 0.84 uIU/mL (05-24 @ 07:28)    PT/INR - ( 23 May 2023 10:12 )   PT: 14.6 sec;   INR: 1.26 ratio         PTT - ( 23 May 2023 10:12 )  PTT:32.2 sec    Culture - Blood (05.23.23 @ 11:20)    Gram Stain:   Growth in aerobic and anaerobic bottles: Gram Positive Cocci in Pairs and  Chains   Specimen Source: .Blood Blood-Peripheral   Culture Results:   Growth in aerobic and anaerobic bottles: Streptococcus pneumoniae  See previous culture [id number]  	  Culture - Blood (05.23.23 @ 09:45)    -  Streptococcus pneumoniae: Detec   Gram Stain:   Growth in aerobic bottle: Gram Positive Cocci in Pairs and Chains  Growth in anaerobic bottle: Gram Positive Cocci in Pairs and Chains   Specimen Source: .Blood Blood-Peripheral   Organism: Blood Culture PCR   Culture Results:   Growth in aerobic and anaerobic bottles: Streptococcus pneumoniae  ***Blood Panel PCR results on this specimen are available  approximately 3 hours after the Gram stain result.***  Gram stain, PCR, and/or culture results may not always  correspond due to difference in methodologies.  ************************************************************  This PCR assay was performed by multiplex PCR. This  Assay tests for 66 bacterial and resistance gene targets.  Please refer to the [facility] test directory  at [email or web address] for details.   Organism Identification: Blood Culture PCR   Method Type: PCR    1. Repeat blood cult, pneumococcus is unusual pathogen for endovascular infection, may consider repeat echo/RACHEL  2.CT chest  demonstrates perihilar infiltrates and adjacent pleural effusion and in a setting of high grade pneumococcal bacteremia, puts him at risk of parapneumonic effusion    3. Will increase CTX to 2g daily  4. Podiatry f/u for L heel wound, but unlikely source of pneumococcemia    < from: TTE W or WO Ultrasound Enhancing Agent (05.24.23 @ 12:00) >   1. The left ventricular systolic function is mildly decreased with an ejection fraction visually estimated at 45 to 50 %. There are regional wall motion abnormalities.   2. Mid and apical anterior septum and apex are abnormal.   3. Normal systolic function. The tricuspid annular plane systolic excursion (TAPSE) is 1.8 cm (normal >=1.7 cm).   4. No prior echocardiogram is available for comparison.      < from: CT Chest No Cont (05.24.23 @ 08:48) >  Findings suggestive of pulmonary edema.      1 Renal - Next HD today    2 ID-IV abx and possible CT of chest to define the infiltrate  Cont supplemental oxygen   Febrile again   3 CVS-Not in heart failure     Assessment and plan  ---------------------------  64yo M, hx of ESRD on HD MWF, CAD s/p stents, CHF, DM, presents with generalized weakness for 1 day. Per wife, patient seemed weak before dialysis yesterday but worsened after dialysis. Unable to stand up, very sleepy, poor appetite, seemed confused. Also with fever.  pt with sig cardiac hx , chf, cad s/p multiple stents , ESRD on HD , with generalized weakness r/o sepsis  ID eval appreciated  check cultures  abx as per ID  pt with hx of ashd, ischemic cardiomyopathy s/p stents continue all cardiac meds  asa/plavix  increase trop doubt cardiac pt with no cardiac complain ?increase sec to renal failure  ESRD renl eval  dvt prophylaxis  chest x ray, will consider ct  endocrine eval  + strep Bacteriemia ID appreciated/ echo noted may need RACHEL  ct chest pulmonary edema, increase fluid withdrawal  continue all cardiac meds  podiatry consult  continue abx

## 2023-05-24 NOTE — PROVIDER CONTACT NOTE (CRITICAL VALUE NOTIFICATION) - SITUATION
Gram positive cocci in pairs; positive blood cultures
pt presented to the ED c/o weakness and intermittent confusion.

## 2023-05-24 NOTE — PROGRESS NOTE ADULT - SUBJECTIVE AND OBJECTIVE BOX
[facility] [phone]        Patient seen and examined is bed.   He was the same   Febrile         MEDICATIONS  (STANDING):  acetaminophen   IVPB .. 1000 milliGRAM(s) IV Intermittent once  allopurinol 100 milliGRAM(s) Oral daily  aspirin  chewable 81 milliGRAM(s) Oral daily  atorvastatin 80 milliGRAM(s) Oral at bedtime  carvedilol 12.5 milliGRAM(s) Oral every 12 hours  cefTRIAXone   IVPB 1000 milliGRAM(s) IV Intermittent every 24 hours  clopidogrel Tablet 75 milliGRAM(s) Oral daily  doxycycline monohydrate Capsule 100 milliGRAM(s) Oral every 12 hours  heparin   Injectable 5000 Unit(s) SubCutaneous every 12 hours  insulin glargine Injectable (LANTUS) 8 Unit(s) SubCutaneous at bedtime  insulin lispro (ADMELOG) corrective regimen sliding scale   SubCutaneous three times a day before meals  senna 2 Tablet(s) Oral at bedtime  tamsulosin 0.4 milliGRAM(s) Oral at bedtime  valsartan 40 milliGRAM(s) Oral daily      VITAL:  T(C): , Max: 38.1 (05-23-23 @ 17:15)  T(F): , Max: 100.6 (05-23-23 @ 17:15)  HR: 80 (05-24-23 @ 05:26)  BP: 156/76 (05-24-23 @ 05:26)  BP(mean): --  RR: 18 (05-24-23 @ 05:26)  SpO2: 99% (05-24-23 @ 05:26)  Wt(kg): --    I and O's:        PHYSICAL EXAM:    Constitutional: NAD  Neck:  No JVD  Respiratory: CTAB/L  Cardiovascular: S1 and S2  Gastrointestinal: BS+, soft, NT/ND  Extremities: No peripheral edema  Neurological: A/O x 3, no focal deficits  Psychiatric: Normal mood, normal affect  : No Jay  Skin: No rashes  Access: Not applicable    LABS:                        9.5    7.81  )-----------( 375      ( 24 May 2023 07:28 )             31.5     05-24    139  |  97  |  58<H>  ----------------------------<  111<H>  3.2<L>   |  24  |  5.19<H>    Ca    8.2<L>      24 May 2023 07:28    TPro  6.3  /  Alb  3.1<L>  /  TBili  0.8  /  DBili  x   /  AST  9<L>  /  ALT  9<L>  /  AlkPhos  87  05-23          Urine Studies:          RADIOLOGY & ADDITIONAL STUDIES:      < from: CT Head No Cont (05.23.23 @ 10:54) >    ACC: [id number] EXAM:  CT BRAIN   ORDERED BY: EDEN NICKERSON     PROCEDURE DATE:  05/23/2023          INTERPRETATION:  Noncontrast CT of the brain.    CLINICAL INDICATION:  Confusion    TECHNIQUE : Axial CT scanning of the brain was obtained from the skull   base to the vertex without the administration of intravenous contrast.   Sagittal and coronal reformats were provided.    COMPARISON: CT brain 1/13/2023    FINDINGS:    No hydrocephalus, mass effect, midline shift, acute intracranial   hemorrhage, or brain edema.    Small chronic left lentiform nucleus infarct.    Similar-appearing left anterior temporal arachnoid cyst.    Bilateral maxillary sinus mucosal thickening, remaining visualized   paranasal sinuses and mastoid air cells are clear.    IMPRESSION:    No hydrocephalus, acute intracranial hemorrhage, mass effect, or brain   edema.    --- End of Report ---            STACI BOOTH MD; Attending Radiologist  This document has been electronically signed. May 23 2023 10:46AM    < end of copied text >

## 2023-05-24 NOTE — PROGRESS NOTE ADULT - SUBJECTIVE AND OBJECTIVE BOX
date of service: 05-24-23 @ 08:29  afebrile  REVIEW OF SYSTEMS:  CONSTITUTIONAL: No fever,  no  weight loss  ENT:  No  tinnitus,   no   vertigo  NECK: No pain or stiffness  RESPIRATORY: No cough, wheezing, chills or hemoptysis;    No Shortness of Breath  CARDIOVASCULAR: No chest pain, palpitations, dizziness  GASTROINTESTINAL: No abdominal or epigastric pain. No nausea, vomiting, or hematemesis; No diarrhea  No melena or hematochezia.  GENITOURINARY: No dysuria, frequency, hematuria, or incontinence  NEUROLOGICAL: No headaches  SKIN: No itching,  no   rash  LYMPH Nodes: No enlarged glands  ENDOCRINE: No heat or cold intolerance  MUSCULOSKELETAL: No joint pain or swelling  PSYCHIATRIC: No depression, anxiety  HEME/LYMPH: No easy bruising, or bleeding gums  ALLERGY AND IMMUNOLOGIC: No hives or eczema	    MEDICATIONS  (STANDING):  acetaminophen   IVPB .. 1000 milliGRAM(s) IV Intermittent once  allopurinol 100 milliGRAM(s) Oral daily  aspirin  chewable 81 milliGRAM(s) Oral daily  atorvastatin 80 milliGRAM(s) Oral at bedtime  carvedilol 12.5 milliGRAM(s) Oral every 12 hours  cefTRIAXone   IVPB 1000 milliGRAM(s) IV Intermittent every 24 hours  clopidogrel Tablet 75 milliGRAM(s) Oral daily  doxycycline monohydrate Capsule 100 milliGRAM(s) Oral every 12 hours  heparin   Injectable 5000 Unit(s) SubCutaneous every 12 hours  insulin glargine Injectable (LANTUS) 8 Unit(s) SubCutaneous at bedtime  insulin lispro (ADMELOG) corrective regimen sliding scale   SubCutaneous three times a day before meals  senna 2 Tablet(s) Oral at bedtime  tamsulosin 0.4 milliGRAM(s) Oral at bedtime  valsartan 40 milliGRAM(s) Oral daily    MEDICATIONS  (PRN):      Vital Signs Last 24 Hrs  T(C): 37.1 (24 May 2023 05:26), Max: 38.1 (23 May 2023 17:15)  T(F): 98.8 (24 May 2023 05:26), Max: 100.6 (23 May 2023 17:15)  HR: 80 (24 May 2023 05:26) (78 - 87)  BP: 156/76 (24 May 2023 05:26) (135/74 - 169/97)  BP(mean): --  RR: 18 (24 May 2023 05:26) (18 - 22)  SpO2: 99% (24 May 2023 05:26) (89% - 100%)    Parameters below as of 24 May 2023 05:26  Patient On (Oxygen Delivery Method): nasal cannula  O2 Flow (L/min): 2    CAPILLARY BLOOD GLUCOSE      POCT Blood Glucose.: 130 mg/dL (24 May 2023 07:35)  POCT Blood Glucose.: 141 mg/dL (23 May 2023 22:32)  POCT Blood Glucose.: 205 mg/dL (23 May 2023 20:11)  POCT Blood Glucose.: 211 mg/dL (23 May 2023 19:07)  POCT Blood Glucose.: 244 mg/dL (23 May 2023 09:12)    I&O's Summary        Appearance: Normal	  HEENT:   Normal oral mucosa, PERRL, EOMI	  Lymphatic: No lymphadenopathy  Cardiovascular: Normal S1 S2, No JVD  Respiratory: Lungs clear to auscultation	  Gastrointestinal:  Soft, Non-tender, + BS	  Skin: No rash, No ecchymoses	  Extremities:     LABS:                        9.5    7.81  )-----------( 375      ( 24 May 2023 07:28 )             31.5     05-24    139  |  97  |  58<H>  ----------------------------<  111<H>  3.2<L>   |  24  |  5.19<H>    Ca    8.2<L>      24 May 2023 07:28    TPro  6.3  /  Alb  3.1<L>  /  TBili  0.8  /  DBili  x   /  AST  9<L>  /  ALT  9<L>  /  AlkPhos  87  05-23    PT/INR - ( 23 May 2023 10:12 )   PT: 14.6 sec;   INR: 1.26 ratio         PTT - ( 23 May 2023 10:12 )  PTT:32.2 sec  CARDIAC MARKERS ( 23 May 2023 18:11 )  x     / x     / 74 U/L / x     / <1.0 ng/mL            05-23 @ 09:40  3.3  52            Culture - Blood (collected 05-23-23 @ 11:20)  Source: .Blood Blood-Peripheral  Gram Stain (05-24-23 @ 02:42):    Growth in aerobic and anaerobic bottles: Gram Positive Cocci in Pairs and    Chains  Preliminary Report (05-24-23 @ 02:42):    Growth in aerobic and anaerobic bottles: Gram Positive Cocci in Pairs and    Chains    Culture - Blood (collected 05-23-23 @ 09:45)  Source: .Blood Blood-Peripheral  Gram Stain (05-24-23 @ 00:28):    Growth in aerobic bottle: Gram Positive Cocci in Pairs and Chains    Growth in anaerobic bottle: Gram Positive Cocci in Pairs and Chains  Preliminary Report (05-24-23 @ 00:29):    Growth in anaerobic bottle: Gram Positive Cocci in Pairs and Chains    Growth in aerobic bottle: Gram Positive Cocci in Pairs and Chains    ***Blood Panel PCR results on this specimen are available    approximately 3 hours after the Gram stain result.***    Gram stain, PCR, and/or culture results may not always    correspond due to difference in methodologies.    ************************************************************    This PCR assay was performed by multiplex PCR. This    Assay tests for 66 bacterial and resistance gene targets.    Please refer to the [facility] test directory    at [email or web address] for details.  Organism: Blood Culture PCR (05-24-23 @ 01:49)  Organism: Blood Culture PCR (05-24-23 @ 01:49)      Method Type: PCR      -  Streptococcus pneumoniae: Detec        Consultant(s) Notes Reviewed:      Care Discussed with Consultants/Other Providers:

## 2023-05-25 LAB
GLUCOSE BLDC GLUCOMTR-MCNC: 145 MG/DL — HIGH (ref 70–99)
GLUCOSE BLDC GLUCOMTR-MCNC: 149 MG/DL — HIGH (ref 70–99)
GLUCOSE BLDC GLUCOMTR-MCNC: 161 MG/DL — HIGH (ref 70–99)
GLUCOSE BLDC GLUCOMTR-MCNC: 203 MG/DL — HIGH (ref 70–99)
MRSA PCR RESULT.: SIGNIFICANT CHANGE UP
S AUREUS DNA NOSE QL NAA+PROBE: SIGNIFICANT CHANGE UP

## 2023-05-25 PROCEDURE — 99232 SBSQ HOSP IP/OBS MODERATE 35: CPT

## 2023-05-25 RX ORDER — ERYTHROPOIETIN 10000 [IU]/ML
10000 INJECTION, SOLUTION INTRAVENOUS; SUBCUTANEOUS ONCE
Refills: 0 | Status: COMPLETED | OUTPATIENT
Start: 2023-05-26 | End: 2023-05-29

## 2023-05-25 RX ORDER — CHLORHEXIDINE GLUCONATE 213 G/1000ML
1 SOLUTION TOPICAL
Refills: 0 | Status: DISCONTINUED | OUTPATIENT
Start: 2023-05-25 | End: 2023-05-29

## 2023-05-25 RX ADMIN — Medication 100 MILLIGRAM(S): at 14:37

## 2023-05-25 RX ADMIN — ATORVASTATIN CALCIUM 80 MILLIGRAM(S): 80 TABLET, FILM COATED ORAL at 22:12

## 2023-05-25 RX ADMIN — Medication 100 MILLIGRAM(S): at 20:59

## 2023-05-25 RX ADMIN — Medication 3 UNIT(S): at 12:48

## 2023-05-25 RX ADMIN — Medication 3 UNIT(S): at 17:46

## 2023-05-25 RX ADMIN — INSULIN GLARGINE 10 UNIT(S): 100 INJECTION, SOLUTION SUBCUTANEOUS at 22:12

## 2023-05-25 RX ADMIN — CARVEDILOL PHOSPHATE 12.5 MILLIGRAM(S): 80 CAPSULE, EXTENDED RELEASE ORAL at 09:49

## 2023-05-25 RX ADMIN — HEPARIN SODIUM 5000 UNIT(S): 5000 INJECTION INTRAVENOUS; SUBCUTANEOUS at 09:49

## 2023-05-25 RX ADMIN — VALSARTAN 40 MILLIGRAM(S): 80 TABLET ORAL at 06:16

## 2023-05-25 RX ADMIN — SENNA PLUS 2 TABLET(S): 8.6 TABLET ORAL at 22:12

## 2023-05-25 RX ADMIN — HEPARIN SODIUM 5000 UNIT(S): 5000 INJECTION INTRAVENOUS; SUBCUTANEOUS at 17:47

## 2023-05-25 RX ADMIN — TAMSULOSIN HYDROCHLORIDE 0.4 MILLIGRAM(S): 0.4 CAPSULE ORAL at 22:11

## 2023-05-25 RX ADMIN — Medication 100 MILLIGRAM(S): at 12:49

## 2023-05-25 RX ADMIN — CEFTRIAXONE 100 MILLIGRAM(S): 500 INJECTION, POWDER, FOR SOLUTION INTRAMUSCULAR; INTRAVENOUS at 12:53

## 2023-05-25 RX ADMIN — Medication 1: at 08:42

## 2023-05-25 RX ADMIN — CLOPIDOGREL BISULFATE 75 MILLIGRAM(S): 75 TABLET, FILM COATED ORAL at 12:49

## 2023-05-25 RX ADMIN — CHLORHEXIDINE GLUCONATE 1 APPLICATION(S): 213 SOLUTION TOPICAL at 14:38

## 2023-05-25 RX ADMIN — Medication 3 UNIT(S): at 08:42

## 2023-05-25 RX ADMIN — Medication 100 MILLIGRAM(S): at 09:04

## 2023-05-25 RX ADMIN — CARVEDILOL PHOSPHATE 12.5 MILLIGRAM(S): 80 CAPSULE, EXTENDED RELEASE ORAL at 17:47

## 2023-05-25 RX ADMIN — Medication 81 MILLIGRAM(S): at 12:49

## 2023-05-25 NOTE — PROVIDER CONTACT NOTE (MEDICATION) - ASSESSMENT
pt A&Ox4, VSS. pt educated on the importance of getting an IV access, verbalized understanding. Stated he will wait after breakfast. Denies any  s/s of distress and discomfort.

## 2023-05-25 NOTE — PROGRESS NOTE ADULT - SUBJECTIVE AND OBJECTIVE BOX
[facility] [phone]        Patient seen and examined in bed.  He was the same         MEDICATIONS  (STANDING):  acetaminophen   IVPB .. 1000 milliGRAM(s) IV Intermittent once  allopurinol 100 milliGRAM(s) Oral daily  aspirin  chewable 81 milliGRAM(s) Oral daily  atorvastatin 80 milliGRAM(s) Oral at bedtime  carvedilol 12.5 milliGRAM(s) Oral every 12 hours  cefTRIAXone   IVPB 2000 milliGRAM(s) IV Intermittent every 24 hours  clopidogrel Tablet 75 milliGRAM(s) Oral daily  heparin   Injectable 5000 Unit(s) SubCutaneous every 12 hours  insulin glargine Injectable (LANTUS) 10 Unit(s) SubCutaneous at bedtime  insulin lispro (ADMELOG) corrective regimen sliding scale   SubCutaneous three times a day before meals  insulin lispro Injectable (ADMELOG) 3 Unit(s) SubCutaneous three times a day before meals  senna 2 Tablet(s) Oral at bedtime  tamsulosin 0.4 milliGRAM(s) Oral at bedtime  valsartan 40 milliGRAM(s) Oral daily      VITAL:  T(C): , Max: 37.3 (05-24-23 @ 16:10)  T(F): , Max: 99.2 (05-25-23 @ 04:30)  HR: 76 (05-25-23 @ 04:30)  BP: 168/91 (05-25-23 @ 04:30)  BP(mean): --  RR: 18 (05-25-23 @ 04:30)  SpO2: 95% (05-25-23 @ 04:30)  Wt(kg): --    I and O's:    05-24 @ 07:01  -  05-25 @ 07:00  --------------------------------------------------------  IN: 320 mL / OUT: 2300 mL / NET: -1980 mL          PHYSICAL EXAM:    Constitutional: NAD  Neck:  No JVD  Respiratory: CTAB/L  Cardiovascular: S1 and S2  Gastrointestinal: BS+, soft, NT/ND  Extremities: No peripheral edema  Neurological: A/O x 3, no focal deficits  Psychiatric: Normal mood, normal affect  : No Jay  Skin: No rashes  Access: avf     LABS:                        9.5    7.81  )-----------( 375      ( 24 May 2023 07:28 )             31.5     05-24    139  |  97  |  58<H>  ----------------------------<  111<H>  3.2<L>   |  24  |  5.19<H>    Ca    8.2<L>      24 May 2023 07:28    TPro  6.3  /  Alb  3.1<L>  /  TBili  0.8  /  DBili  x   /  AST  9<L>  /  ALT  9<L>  /  AlkPhos  87  05-23          Urine Studies:          RADIOLOGY & ADDITIONAL STUDIES:      < from: CT Chest No Cont (05.24.23 @ 08:48) >    ACC: [id number] EXAM:  CT CHEST   ORDERED BY: MUNIR ERNAE     PROCEDURE DATE:  05/24/2023          INTERPRETATION:  CLINICAL INFORMATION: Abnormal chest x-ray, fever. Strep   pneumoniae bacteremia. Rule out pneumonia. History of CAD status post   stents, ESRD on dialysis.    COMPARISON: CT chest 12/12/2022.    CONTRAST/COMPLICATIONS:  IV Contrast: NONE  Oral Contrast: NONE  Complications: None reported at time of study completion    PROCEDURE:  CT scan of the chest was obtained without intravenous contrast.    FINDINGS:    LYMPH NODES: No lymphadenopathy.    HEART/VASCULATURE: The heart is normal in size. Calcifications of the   aorta and coronary arteries. Status post coronary stents.  No pericardial   effusion.    AIRWAYS/LUNGS/PLEURA: Central airways are clear. Perihilar opacities of   the right upper, middle, and lower lobes. Small to moderate right pleural   effusion and small left pleural effusion with subadjacent atelectasis of   the bilateral lower lobes. Findings suspicious for congestive heart   failure exacerbation. Left pulmonary artery cardioMEMs device.    UPPER ABDOMEN: Unremarkable.    BONES/SOFT TISSUES: Unremarkable.    IMPRESSION:    Findings suggestive of pulmonary edema.    --- End of Report ---          JEANNA BLUNT MD; Resident Radiologist  This document has been electronically signed.  CODY SKAGGS MD; Attending Radiologist  This documen    < end of copied text >

## 2023-05-25 NOTE — PROGRESS NOTE ADULT - SUBJECTIVE AND OBJECTIVE BOX
seen earlier today     Chief Complaint: Type 2 Diabetes Mellitus     INTERVAL HX:      Review of Systems:  General: As above  Cardiovascular: No chest pain  Respiratory: No SOB  GI: No nausea, vomiting  Endocrine: no  S&Sx of hypoglycemia    Allergies    No Known Drug Allergies  shellfish (Unknown)    Intolerances      MEDICATIONS  (STANDING):  acetaminophen   IVPB .. 1000 milliGRAM(s) IV Intermittent once  allopurinol 100 milliGRAM(s) Oral daily  aspirin  chewable 81 milliGRAM(s) Oral daily  atorvastatin 80 milliGRAM(s) Oral at bedtime  carvedilol 12.5 milliGRAM(s) Oral every 12 hours  cefTRIAXone   IVPB 2000 milliGRAM(s) IV Intermittent every 24 hours  clopidogrel Tablet 75 milliGRAM(s) Oral daily  heparin   Injectable 5000 Unit(s) SubCutaneous every 12 hours  insulin glargine Injectable (LANTUS) 10 Unit(s) SubCutaneous at bedtime  insulin lispro (ADMELOG) corrective regimen sliding scale   SubCutaneous three times a day before meals  insulin lispro Injectable (ADMELOG) 3 Unit(s) SubCutaneous three times a day before meals  senna 2 Tablet(s) Oral at bedtime  tamsulosin 0.4 milliGRAM(s) Oral at bedtime  valsartan 40 milliGRAM(s) Oral daily      allopurinol   100 milliGRAM(s) Oral (05-24-23 @ 11:29)   100 milliGRAM(s) Oral (05-23-23 @ 19:04)    atorvastatin   80 milliGRAM(s) Oral (05-24-23 @ 21:53)   80 milliGRAM(s) Oral (05-23-23 @ 22:37)    insulin glargine Injectable (LANTUS)   10 Unit(s) SubCutaneous (05-24-23 @ 22:38)    insulin glargine Injectable (LANTUS)   8 Unit(s) SubCutaneous (05-23-23 @ 22:38)    insulin lispro (ADMELOG) corrective regimen sliding scale   1 Unit(s) SubCutaneous (05-25-23 @ 08:42)   2  SubCutaneous (05-24-23 @ 11:40)   2 Unit(s) SubCutaneous (05-23-23 @ 20:13)    insulin lispro Injectable (ADMELOG)   3 Unit(s) SubCutaneous (05-25-23 @ 08:42)        PHYSICAL EXAM:  VITALS: T(C): 37.3 (05-25-23 @ 04:30)  T(F): 99.2 (05-25-23 @ 04:30), Max: 99.2 (05-25-23 @ 04:30)  HR: 72 (05-25-23 @ 09:30) (72 - 81)  BP: 112/66 (05-25-23 @ 09:30) (112/66 - 172/90)  RR:  (16 - 18)  SpO2:  (95% - 100%)  Wt(kg): --  GENERAL: in NAD  Respiratory: Respirations unlabored   Extremities: Warm, no edema  NEURO: Alert , appropriate     LABS:  POCT Blood Glucose.: 161 mg/dL (05-25-23 @ 08:28)  POCT Blood Glucose.: 144 mg/dL (05-24-23 @ 21:45)  POCT Blood Glucose.: 217 mg/dL (05-24-23 @ 11:32)  POCT Blood Glucose.: 130 mg/dL (05-24-23 @ 07:35)  POCT Blood Glucose.: 141 mg/dL (05-23-23 @ 22:32)  POCT Blood Glucose.: 205 mg/dL (05-23-23 @ 20:11)  POCT Blood Glucose.: 211 mg/dL (05-23-23 @ 19:07)  POCT Blood Glucose.: 244 mg/dL (05-23-23 @ 09:12)                          9.5    7.81  )-----------( 375      ( 24 May 2023 07:28 )             31.5     05-24    139  |  97  |  58<H>  ----------------------------<  111<H>  3.2<L>   |  24  |  5.19<H>    Ca    8.2<L>      24 May 2023 07:28    Thyroid Function Tests:  05-24 @ 07:28 TSH 0.84 FreeT4 1.4 T3 -- Anti TPO -- Anti Thyroglobulin Ab -- TSI --  A1C with Estimated Average Glucose Result: 7.0 % (03-15-23 @ 07:27)  Estimated Average Glucose: 154 mg/dL (03-15-23 @ 07:27)    Diet, Renal Restrictions:   For patients receiving Renal Replacement - No Protein Restr, No Conc K, No Conc Phos, Low Sodium  Consistent Carbohydrate Evening Snack (CSTCHOSN) (05-23-23 @ 12:09) [Active]              seen earlier today     Chief Complaint: Type 2 Diabetes Mellitus     INTERVAL HX:  Patient reports that he is feeling a little bit better.  Per nursing refuses to wear nasal cannula intermittently.  Encourage patient to be adherent with his treatment plan.  He reports that the food at the hospital was not very good.  Therefore he has not been eating very much.  At home, his biggest issue is adherent to a carb consistent diet.  I discussed the subject of seeking a therapist/psychiatrist/psychologist for his worsening depression.  Patient declined and stated that he will deal with it "his way".      Review of Systems:  General: As above  Cardiovascular: No chest pain  Respiratory: No SOB  GI: No nausea, vomiting  Endocrine: no  S&Sx of hypoglycemia    Allergies    No Known Drug Allergies  shellfish (Unknown)    Intolerances      MEDICATIONS  (STANDING):  acetaminophen   IVPB .. 1000 milliGRAM(s) IV Intermittent once  allopurinol 100 milliGRAM(s) Oral daily  aspirin  chewable 81 milliGRAM(s) Oral daily  atorvastatin 80 milliGRAM(s) Oral at bedtime  carvedilol 12.5 milliGRAM(s) Oral every 12 hours  cefTRIAXone   IVPB 2000 milliGRAM(s) IV Intermittent every 24 hours  clopidogrel Tablet 75 milliGRAM(s) Oral daily  heparin   Injectable 5000 Unit(s) SubCutaneous every 12 hours  insulin glargine Injectable (LANTUS) 10 Unit(s) SubCutaneous at bedtime  insulin lispro (ADMELOG) corrective regimen sliding scale   SubCutaneous three times a day before meals  insulin lispro Injectable (ADMELOG) 3 Unit(s) SubCutaneous three times a day before meals  senna 2 Tablet(s) Oral at bedtime  tamsulosin 0.4 milliGRAM(s) Oral at bedtime  valsartan 40 milliGRAM(s) Oral daily      allopurinol   100 milliGRAM(s) Oral (05-24-23 @ 11:29)   100 milliGRAM(s) Oral (05-23-23 @ 19:04)    atorvastatin   80 milliGRAM(s) Oral (05-24-23 @ 21:53)   80 milliGRAM(s) Oral (05-23-23 @ 22:37)    insulin glargine Injectable (LANTUS)   10 Unit(s) SubCutaneous (05-24-23 @ 22:38)    insulin glargine Injectable (LANTUS)   8 Unit(s) SubCutaneous (05-23-23 @ 22:38)    insulin lispro (ADMELOG) corrective regimen sliding scale   1 Unit(s) SubCutaneous (05-25-23 @ 08:42)   2  SubCutaneous (05-24-23 @ 11:40)   2 Unit(s) SubCutaneous (05-23-23 @ 20:13)    insulin lispro Injectable (ADMELOG)   3 Unit(s) SubCutaneous (05-25-23 @ 08:42)        PHYSICAL EXAM:  VITALS: T(C): 37.3 (05-25-23 @ 04:30)  T(F): 99.2 (05-25-23 @ 04:30), Max: 99.2 (05-25-23 @ 04:30)  HR: 72 (05-25-23 @ 09:30) (72 - 81)  BP: 112/66 (05-25-23 @ 09:30) (112/66 - 172/90)  RR:  (16 - 18)  SpO2:  (95% - 100%)  Wt(kg): --  GENERAL: in NAD  Respiratory: Respirations unlabored   Extremities: Warm, no edema  NEURO: Alert , appropriate     LABS:  POCT Blood Glucose.: 161 mg/dL (05-25-23 @ 08:28)  POCT Blood Glucose.: 144 mg/dL (05-24-23 @ 21:45)  POCT Blood Glucose.: 217 mg/dL (05-24-23 @ 11:32)  POCT Blood Glucose.: 130 mg/dL (05-24-23 @ 07:35)  POCT Blood Glucose.: 141 mg/dL (05-23-23 @ 22:32)  POCT Blood Glucose.: 205 mg/dL (05-23-23 @ 20:11)  POCT Blood Glucose.: 211 mg/dL (05-23-23 @ 19:07)  POCT Blood Glucose.: 244 mg/dL (05-23-23 @ 09:12)                          9.5    7.81  )-----------( 375      ( 24 May 2023 07:28 )             31.5     05-24    139  |  97  |  58<H>  ----------------------------<  111<H>  3.2<L>   |  24  |  5.19<H>    Ca    8.2<L>      24 May 2023 07:28    Thyroid Function Tests:  05-24 @ 07:28 TSH 0.84 FreeT4 1.4 T3 -- Anti TPO -- Anti Thyroglobulin Ab -- TSI --  A1C with Estimated Average Glucose Result: 7.0 % (03-15-23 @ 07:27)  Estimated Average Glucose: 154 mg/dL (03-15-23 @ 07:27)    Diet, Renal Restrictions:   For patients receiving Renal Replacement - No Protein Restr, No Conc K, No Conc Phos, Low Sodium  Consistent Carbohydrate Evening Snack (CSTCHOSN) (05-23-23 @ 12:09) [Active]

## 2023-05-25 NOTE — PROGRESS NOTE ADULT - SUBJECTIVE AND OBJECTIVE BOX
date of service: 05-25-23 @ 07:58  afberile  REVIEW OF SYSTEMS:  CONSTITUTIONAL: No fever,  no  weight loss  ENT:  No  tinnitus,   no   vertigo  NECK: No pain or stiffness  RESPIRATORY: No cough, wheezing, chills or hemoptysis;    No Shortness of Breath  CARDIOVASCULAR: No chest pain, palpitations, dizziness  GASTROINTESTINAL: No abdominal or epigastric pain. No nausea, vomiting, or hematemesis; No diarrhea  No melena or hematochezia.  GENITOURINARY: No dysuria, frequency, hematuria, or incontinence  NEUROLOGICAL: No headaches  SKIN: No itching,  no   rash  LYMPH Nodes: No enlarged glands  ENDOCRINE: No heat or cold intolerance  MUSCULOSKELETAL: No joint pain or swelling  PSYCHIATRIC: No depression, anxiety  HEME/LYMPH: No easy bruising, or bleeding gums  ALLERGY AND IMMUNOLOGIC: No hives or eczema	    MEDICATIONS  (STANDING):  acetaminophen   IVPB .. 1000 milliGRAM(s) IV Intermittent once  allopurinol 100 milliGRAM(s) Oral daily  aspirin  chewable 81 milliGRAM(s) Oral daily  atorvastatin 80 milliGRAM(s) Oral at bedtime  carvedilol 12.5 milliGRAM(s) Oral every 12 hours  cefTRIAXone   IVPB 2000 milliGRAM(s) IV Intermittent every 24 hours  clopidogrel Tablet 75 milliGRAM(s) Oral daily  heparin   Injectable 5000 Unit(s) SubCutaneous every 12 hours  insulin glargine Injectable (LANTUS) 10 Unit(s) SubCutaneous at bedtime  insulin lispro (ADMELOG) corrective regimen sliding scale   SubCutaneous three times a day before meals  insulin lispro Injectable (ADMELOG) 3 Unit(s) SubCutaneous three times a day before meals  senna 2 Tablet(s) Oral at bedtime  tamsulosin 0.4 milliGRAM(s) Oral at bedtime  valsartan 40 milliGRAM(s) Oral daily    MEDICATIONS  (PRN):      Vital Signs Last 24 Hrs  T(C): 37.3 (25 May 2023 04:30), Max: 37.3 (24 May 2023 16:10)  T(F): 99.2 (25 May 2023 04:30), Max: 99.2 (25 May 2023 04:30)  HR: 76 (25 May 2023 04:30) (73 - 81)  BP: 168/91 (25 May 2023 04:30) (136/71 - 172/90)  BP(mean): --  RR: 18 (25 May 2023 04:30) (16 - 18)  SpO2: 95% (25 May 2023 04:30) (95% - 100%)    Parameters below as of 25 May 2023 04:30  Patient On (Oxygen Delivery Method): room air  O2 Flow (L/min): 2    CAPILLARY BLOOD GLUCOSE      POCT Blood Glucose.: 144 mg/dL (24 May 2023 21:45)  POCT Blood Glucose.: 217 mg/dL (24 May 2023 11:32)    I&O's Summary    24 May 2023 07:01  -  25 May 2023 07:00  --------------------------------------------------------  IN: 320 mL / OUT: 2300 mL / NET: -1980 mL          Appearance: Normal	  HEENT:   Normal oral mucosa, PERRL, EOMI	  Lymphatic: No lymphadenopathy  Cardiovascular: Normal S1 S2, No JVD  Respiratory: Lungs clear to auscultation	  Gastrointestinal:  Soft, Non-tender, + BS	  Skin: No rash, No ecchymoses	  Extremities:     LABS:                        9.5    7.81  )-----------( 375      ( 24 May 2023 07:28 )             31.5     05-24    139  |  97  |  58<H>  ----------------------------<  111<H>  3.2<L>   |  24  |  5.19<H>    Ca    8.2<L>      24 May 2023 07:28    TPro  6.3  /  Alb  3.1<L>  /  TBili  0.8  /  DBili  x   /  AST  9<L>  /  ALT  9<L>  /  AlkPhos  87  05-23    PT/INR - ( 23 May 2023 10:12 )   PT: 14.6 sec;   INR: 1.26 ratio         PTT - ( 23 May 2023 10:12 )  PTT:32.2 sec  CARDIAC MARKERS ( 23 May 2023 18:11 )  x     / x     / 74 U/L / x     / <1.0 ng/mL            05-23 @ 09:40  3.3  52      Thyroid Stimulating Hormone, Serum: 0.84 uIU/mL (05-24 @ 07:28)          Consultant(s) Notes Reviewed:      Care Discussed with Consultants/Other Providers:

## 2023-05-25 NOTE — PROGRESS NOTE ADULT - SUBJECTIVE AND OBJECTIVE BOX
Date of Service: 05-25-23 @ 07:03           CARDIOLOGY     PROGRESS  NOTE   ________________________________________________    CHIEF COMPLAINT:Patient is a 63y old  Male who presents with a chief complaint of fever, r/o sepsis (24 May 2023 20:21)  doing better  	  REVIEW OF SYSTEMS:  CONSTITUTIONAL: No fever, weight loss, or fatigue  EYES: No eye pain, visual disturbances, or discharge  ENT:  No difficulty hearing, tinnitus, vertigo; No sinus or throat pain  NECK: No pain or stiffness  RESPIRATORY: No cough, wheezing, chills or hemoptysis; decrease Shortness of Breath  CARDIOVASCULAR: No chest pain, palpitations, passing out, dizziness, or leg swelling  GASTROINTESTINAL: No abdominal or epigastric pain. No nausea, vomiting, or hematemesis; No diarrhea or constipation. No melena or hematochezia.  GENITOURINARY: No dysuria, frequency, hematuria, or incontinence  NEUROLOGICAL: No headaches, memory loss, loss of strength, numbness, or tremors  SKIN: No itching, burning, rashes, or lesions   LYMPH Nodes: No enlarged glands  ENDOCRINE: No heat or cold intolerance; No hair loss  MUSCULOSKELETAL: No joint pain or swelling; No muscle, back, or extremity pain  PSYCHIATRIC: No depression, anxiety, mood swings, or difficulty sleeping  HEME/LYMPH: No easy bruising, or bleeding gums  ALLERGY AND IMMUNOLOGIC: No hives or eczema	    [x ] All others negative	  [ ] Unable to obtain    PHYSICAL EXAM:  T(C): 37.3 (05-25-23 @ 04:30), Max: 37.3 (05-24-23 @ 16:10)  HR: 76 (05-25-23 @ 04:30) (73 - 81)  BP: 168/91 (05-25-23 @ 04:30) (136/71 - 172/90)  RR: 18 (05-25-23 @ 04:30) (16 - 18)  SpO2: 95% (05-25-23 @ 04:30) (95% - 100%)  Wt(kg): --  I&O's Summary    24 May 2023 07:01  -  25 May 2023 07:00  --------------------------------------------------------  IN: 320 mL / OUT: 2300 mL / NET: -1980 mL        Appearance: Normal	  HEENT:   Normal oral mucosa, PERRL, EOMI	  Lymphatic: No lymphadenopathy  Cardiovascular: Normal S1 S2, No JVD, + murmurs, No edema  Respiratory: rhonchi  Psychiatry: A & O x 3, Mood & affect appropriate  Gastrointestinal:  Soft, Non-tender, + BS	  Skin: No rashes, No ecchymoses, No cyanosis	  Neurologic: Non-focal  Extremities: Normal range of motion, ulcer heel  Vascular: + pvd    MEDICATIONS  (STANDING):  acetaminophen   IVPB .. 1000 milliGRAM(s) IV Intermittent once  allopurinol 100 milliGRAM(s) Oral daily  aspirin  chewable 81 milliGRAM(s) Oral daily  atorvastatin 80 milliGRAM(s) Oral at bedtime  carvedilol 12.5 milliGRAM(s) Oral every 12 hours  cefTRIAXone   IVPB 2000 milliGRAM(s) IV Intermittent every 24 hours  clopidogrel Tablet 75 milliGRAM(s) Oral daily  heparin   Injectable 5000 Unit(s) SubCutaneous every 12 hours  insulin glargine Injectable (LANTUS) 10 Unit(s) SubCutaneous at bedtime  insulin lispro (ADMELOG) corrective regimen sliding scale   SubCutaneous three times a day before meals  insulin lispro Injectable (ADMELOG) 3 Unit(s) SubCutaneous three times a day before meals  senna 2 Tablet(s) Oral at bedtime  tamsulosin 0.4 milliGRAM(s) Oral at bedtime  valsartan 40 milliGRAM(s) Oral daily      TELEMETRY: 	    ECG:  	  RADIOLOGY:  OTHER: 	  	  LABS:	 	    CARDIAC MARKERS:  CARDIAC MARKERS ( 23 May 2023 18:11 )  x     / x     / 74 U/L / x     / <1.0 ng/mL                                9.5    7.81  )-----------( 375      ( 24 May 2023 07:28 )             31.5     05-24    139  |  97  |  58<H>  ----------------------------<  111<H>  3.2<L>   |  24  |  5.19<H>    Ca    8.2<L>      24 May 2023 07:28    TPro  6.3  /  Alb  3.1<L>  /  TBili  0.8  /  DBili  x   /  AST  9<L>  /  ALT  9<L>  /  AlkPhos  87  05-23    proBNP:   Lipid Profile:   HgA1c:   TSH: Thyroid Stimulating Hormone, Serum: 0.84 uIU/mL (05-24 @ 07:28)    PT/INR - ( 23 May 2023 10:12 )   PT: 14.6 sec;   INR: 1.26 ratio         PTT - ( 23 May 2023 10:12 )  PTT:32.2 sec      1. Repeat blood cult, pneumococcus is unusual pathogen for endovascular infection, may consider repeat echo/RACHEL  2.CT chest  demonstrates perihilar infiltrates and adjacent pleural effusion and in a setting of high grade pneumococcal bacteremia, puts him at risk of parapneumonic effusion    3. Will increase CTX to 2g daily  4. Podiatry f/u for L heel wound, but unlikely source of pneumococcemia      Assessment and plan  ---------------------------  62yo M, hx of ESRD on HD MWF, CAD s/p stents, CHF, DM, presents with generalized weakness for 1 day. Per wife, patient seemed weak before dialysis yesterday but worsened after dialysis. Unable to stand up, very sleepy, poor appetite, seemed confused. Also with fever.  pt with sig cardiac hx , chf, cad s/p multiple stents , ESRD on HD , with generalized weakness r/o sepsis  ID eval appreciated  check cultures  abx as per ID  pt with hx of ashd, ischemic cardiomyopathy s/p stents continue all cardiac meds  asa/plavix  increase trop doubt cardiac pt with no cardiac complain ?increase sec to renal failure  ESRD renl eval  dvt prophylaxis  chest x ray, will consider ct  endocrine eval  + strep Bacteriemia ID appreciated/ echo noted may need RACHEL  ct chest pulmonary edema, increase fluid withdrawal as per renal  continue all cardiac meds  podiatry consult  continue abx, ID appreciated  will consider possibilty of RACHEL to r/o endocarditis  pt with HD catheter ?source will  discuss with ID  increase valsartan if increase bp

## 2023-05-25 NOTE — PROGRESS NOTE ADULT - SUBJECTIVE AND OBJECTIVE BOX
CC: f/u for pneumococcal pneumonia and bacteremia    Patient reports; he is anxious to go home, he has a cough and remains on oxygen via [place]    REVIEW OF SYSTEMS:  All other review of systems negative (Comprehensive ROS)    Antimicrobials Day #  :day 3 Rx  cefTRIAXone   IVPB 2000 milliGRAM(s) IV Intermittent every 24 hours    Other Medications Reviewed  MEDICATIONS  (STANDING):  acetaminophen   IVPB .. 1000 milliGRAM(s) IV Intermittent once  allopurinol 100 milliGRAM(s) Oral daily  aspirin  chewable 81 milliGRAM(s) Oral daily  atorvastatin 80 milliGRAM(s) Oral at bedtime  carvedilol 12.5 milliGRAM(s) Oral every 12 hours  cefTRIAXone   IVPB 2000 milliGRAM(s) IV Intermittent every 24 hours  clopidogrel Tablet 75 milliGRAM(s) Oral daily  heparin   Injectable 5000 Unit(s) SubCutaneous every 12 hours  insulin glargine Injectable (LANTUS) 10 Unit(s) SubCutaneous at bedtime  insulin lispro (ADMELOG) corrective regimen sliding scale   SubCutaneous three times a day before meals  insulin lispro Injectable (ADMELOG) 3 Unit(s) SubCutaneous three times a day before meals  senna 2 Tablet(s) Oral at bedtime  tamsulosin 0.4 milliGRAM(s) Oral at bedtime  valsartan 40 milliGRAM(s) Oral daily    T(F): 99.2 (05-25-23 @ 04:30), Max: 99.2 (05-25-23 @ 04:30)  HR: 72 (05-25-23 @ 09:30)  BP: 112/66 (05-25-23 @ 09:30)  RR: 18 (05-25-23 @ 09:30)  SpO2: 96% (05-25-23 @ 09:30)  Wt(kg): --    PHYSICAL EXAM:  General: alert, no acute distress  Eyes:  anicteric, no conjunctival injection, no discharge  Oropharynx: no lesions or injection 	  Neck: supple, without adenopathy  Lungs: clear to auscultation, decreased at bases  Heart: regular rate and rhythm; no murmur, rubs or gallops  Abdomen: soft, nondistended, nontender, without mass or organomegaly  Skin: no lesions  Extremities: no clubbing, cyanosis, or edema  Neurologic: alert, oriented, moves all extremities    LAB RESULTS:                        9.5    7.81  )-----------( 375      ( 24 May 2023 07:28 )             31.5     05-24    139  |  97  |  58<H>  ----------------------------<  111<H>  3.2<L>   |  24  |  5.19<H>    Ca    8.2<L>      24 May 2023 07:28          MICROBIOLOGY:  RECENT CULTURES:  05-23 @ 11:20 .Blood Blood-Peripheral     Growth in aerobic and anaerobic bottles: Streptococcus pneumoniae  See previous culture [id number]    Growth in aerobic and anaerobic bottles: Gram Positive Cocci in Pairs and  Chains    05-23 @ 09:45 .Blood Blood-Peripheral Blood Culture PCR    Growth in aerobic and anaerobic bottles: Streptococcus pneumoniae      Growth in aerobic bottle: Gram Positive Cocci in Pairs and Chains  Growth in anaerobic bottle: Gram Positive Cocci in Pairs and Chains        RADIOLOGY REVIEWED:    < from: CT Chest No Cont (05.24.23 @ 08:48) >  LYMPH NODES: No lymphadenopathy.    HEART/VASCULATURE: The heart is normal in size. Calcifications of the   aorta and coronary arteries. Status post coronary stents.  No pericardial   effusion.    AIRWAYS/LUNGS/PLEURA: Central airways are clear. Perihilar opacities of   the right upper, middle, and lower lobes. Small to moderate right pleural   effusion and small left pleural effusion with subadjacent atelectasis of   the bilateral lower lobes. Findings suspicious for congestive heart   failure exacerbation. Left pulmonary artery cardioMEMs device.    UPPER ABDOMEN: Unremarkable.    BONES/SOFT TISSUES: Unremarkable.    IMPRESSION:    Findings suggestive of pulmonary edema.    --- End of Report ---    < end of copied text >

## 2023-05-26 LAB
-  CEFTRIAXONE (MENINGITIDIS): SIGNIFICANT CHANGE UP
-  CEFTRIAXONE (NON-MENINGITIDIS): SIGNIFICANT CHANGE UP
-  ERYTHROMYCIN: SIGNIFICANT CHANGE UP
-  LEVOFLOXACIN: SIGNIFICANT CHANGE UP
-  PENICILLIN (MENINGITIDIS): SIGNIFICANT CHANGE UP
-  PENICILLIN (NON-MENINGITIDIS): SIGNIFICANT CHANGE UP
-  PENICILLIN (ORAL PENICILLIN V): SIGNIFICANT CHANGE UP
-  TRIMETHOPRIM/SULFAMETHOXAZOLE: SIGNIFICANT CHANGE UP
-  VANCOMYCIN: SIGNIFICANT CHANGE UP
CULTURE RESULTS: SIGNIFICANT CHANGE UP
CULTURE RESULTS: SIGNIFICANT CHANGE UP
GLUCOSE BLDC GLUCOMTR-MCNC: 115 MG/DL — HIGH (ref 70–99)
GLUCOSE BLDC GLUCOMTR-MCNC: 125 MG/DL — HIGH (ref 70–99)
GLUCOSE BLDC GLUCOMTR-MCNC: 160 MG/DL — HIGH (ref 70–99)
GLUCOSE BLDC GLUCOMTR-MCNC: 169 MG/DL — HIGH (ref 70–99)
GLUCOSE BLDC GLUCOMTR-MCNC: 95 MG/DL — SIGNIFICANT CHANGE UP (ref 70–99)
METHOD TYPE: SIGNIFICANT CHANGE UP
METHOD TYPE: SIGNIFICANT CHANGE UP
ORGANISM # SPEC MICROSCOPIC CNT: SIGNIFICANT CHANGE UP
SPECIMEN SOURCE: SIGNIFICANT CHANGE UP
SPECIMEN SOURCE: SIGNIFICANT CHANGE UP

## 2023-05-26 PROCEDURE — 99232 SBSQ HOSP IP/OBS MODERATE 35: CPT

## 2023-05-26 RX ADMIN — TAMSULOSIN HYDROCHLORIDE 0.4 MILLIGRAM(S): 0.4 CAPSULE ORAL at 22:03

## 2023-05-26 RX ADMIN — Medication 3 UNIT(S): at 17:19

## 2023-05-26 RX ADMIN — INSULIN GLARGINE 10 UNIT(S): 100 INJECTION, SOLUTION SUBCUTANEOUS at 22:34

## 2023-05-26 RX ADMIN — CARVEDILOL PHOSPHATE 12.5 MILLIGRAM(S): 80 CAPSULE, EXTENDED RELEASE ORAL at 06:45

## 2023-05-26 RX ADMIN — CARVEDILOL PHOSPHATE 12.5 MILLIGRAM(S): 80 CAPSULE, EXTENDED RELEASE ORAL at 17:25

## 2023-05-26 RX ADMIN — Medication 100 MILLIGRAM(S): at 12:34

## 2023-05-26 RX ADMIN — VALSARTAN 40 MILLIGRAM(S): 80 TABLET ORAL at 06:45

## 2023-05-26 RX ADMIN — Medication 3 UNIT(S): at 08:12

## 2023-05-26 RX ADMIN — Medication 1: at 12:34

## 2023-05-26 RX ADMIN — Medication 1: at 08:11

## 2023-05-26 RX ADMIN — Medication 100 MILLIGRAM(S): at 06:45

## 2023-05-26 RX ADMIN — Medication 81 MILLIGRAM(S): at 12:34

## 2023-05-26 RX ADMIN — ATORVASTATIN CALCIUM 80 MILLIGRAM(S): 80 TABLET, FILM COATED ORAL at 22:03

## 2023-05-26 RX ADMIN — HEPARIN SODIUM 5000 UNIT(S): 5000 INJECTION INTRAVENOUS; SUBCUTANEOUS at 17:19

## 2023-05-26 RX ADMIN — HEPARIN SODIUM 5000 UNIT(S): 5000 INJECTION INTRAVENOUS; SUBCUTANEOUS at 06:44

## 2023-05-26 RX ADMIN — Medication 3 UNIT(S): at 12:35

## 2023-05-26 RX ADMIN — CHLORHEXIDINE GLUCONATE 1 APPLICATION(S): 213 SOLUTION TOPICAL at 07:26

## 2023-05-26 RX ADMIN — CEFTRIAXONE 100 MILLIGRAM(S): 500 INJECTION, POWDER, FOR SOLUTION INTRAMUSCULAR; INTRAVENOUS at 12:35

## 2023-05-26 RX ADMIN — CLOPIDOGREL BISULFATE 75 MILLIGRAM(S): 75 TABLET, FILM COATED ORAL at 12:34

## 2023-05-26 NOTE — PROGRESS NOTE ADULT - PROBLEM SELECTOR PLAN 4
-F/u with nephrology> on HD  -A1C is not accurate in setting of ESRD  F/u CGM download   F/u Fructosamine levels if possible.

## 2023-05-26 NOTE — PROGRESS NOTE ADULT - NSPROGADDITIONALINFOA_GEN_ALL_CORE
-Plan discussed with pt/team.  Contact info: [phone] (24/7). pager [phone]  Amion on [place]  Teams on M-T-W-F. Unavailable Thu/Weekends/Holidays  Provided face to face education as well as assessed  pt/labs/meds and discussed plan with primary team/pt and wife  Adjusting insulin  Discharge plan  Follow up care

## 2023-05-26 NOTE — PROGRESS NOTE ADULT - SUBJECTIVE AND OBJECTIVE BOX
date of service: 05-26-23 @ 09:56  afberile  REVIEW OF SYSTEMS:  CONSTITUTIONAL: No fever,  no  weight loss  ENT:  No  tinnitus,   no   vertigo  NECK: No pain or stiffness  RESPIRATORY: No cough, wheezing, chills or hemoptysis;    No Shortness of Breath  CARDIOVASCULAR: No chest pain, palpitations, dizziness  GASTROINTESTINAL: No abdominal or epigastric pain. No nausea, vomiting, or hematemesis; No diarrhea  No melena or hematochezia.  GENITOURINARY: No dysuria, frequency, hematuria, or incontinence  NEUROLOGICAL: No headaches  SKIN: No itching,  no   rash  LYMPH Nodes: No enlarged glands  ENDOCRINE: No heat or cold intolerance  MUSCULOSKELETAL: No joint pain or swelling  PSYCHIATRIC: No depression, anxiety  HEME/LYMPH: No easy bruising, or bleeding gums  ALLERGY AND IMMUNOLOGIC: No hives or eczema	    MEDICATIONS  (STANDING):  acetaminophen   IVPB .. 1000 milliGRAM(s) IV Intermittent once  allopurinol 100 milliGRAM(s) Oral daily  aspirin  chewable 81 milliGRAM(s) Oral daily  atorvastatin 80 milliGRAM(s) Oral at bedtime  carvedilol 12.5 milliGRAM(s) Oral every 12 hours  cefTRIAXone   IVPB 2000 milliGRAM(s) IV Intermittent every 24 hours  chlorhexidine 2% Cloths 1 Application(s) Topical <User Schedule>  clopidogrel Tablet 75 milliGRAM(s) Oral daily  epoetin wayne-epbx (RETACRIT) Injectable 82085 Unit(s) IV Push once  heparin   Injectable 5000 Unit(s) SubCutaneous every 12 hours  insulin glargine Injectable (LANTUS) 10 Unit(s) SubCutaneous at bedtime  insulin lispro (ADMELOG) corrective regimen sliding scale   SubCutaneous three times a day before meals  insulin lispro Injectable (ADMELOG) 3 Unit(s) SubCutaneous three times a day before meals  senna 2 Tablet(s) Oral at bedtime  tamsulosin 0.4 milliGRAM(s) Oral at bedtime  valsartan 40 milliGRAM(s) Oral daily    MEDICATIONS  (PRN):  guaiFENesin Oral Liquid (Sugar-Free) 100 milliGRAM(s) Oral every 6 hours PRN Cough      Vital Signs Last 24 Hrs  T(C): 36.8 (26 May 2023 04:43), Max: 37.5 (25 May 2023 12:52)  T(F): 98.2 (26 May 2023 04:43), Max: 99.5 (25 May 2023 12:52)  HR: 79 (26 May 2023 04:43) (71 - 79)  BP: 145/69 (26 May 2023 04:43) (136/74 - 160/81)  BP(mean): --  RR: 18 (26 May 2023 04:43) (18 - 20)  SpO2: 95% (26 May 2023 04:43) (95% - 100%)    Parameters below as of 26 May 2023 04:43  Patient On (Oxygen Delivery Method): room air      CAPILLARY BLOOD GLUCOSE      POCT Blood Glucose.: 169 mg/dL (26 May 2023 07:59)  POCT Blood Glucose.: 203 mg/dL (25 May 2023 22:10)  POCT Blood Glucose.: 149 mg/dL (25 May 2023 17:00)  POCT Blood Glucose.: 145 mg/dL (25 May 2023 12:36)    I&O's Summary    25 May 2023 07:01  -  26 May 2023 07:00  --------------------------------------------------------  IN: 0 mL / OUT: 200 mL / NET: -200 mL          Appearance: Normal	  HEENT:   Normal oral mucosa, PERRL, EOMI	  Lymphatic: No lymphadenopathy  Cardiovascular: Normal S1 S2, No JVD  Respiratory: Lungs clear to auscultation	  Gastrointestinal:  Soft, Non-tender, + BS	  Skin: No rash, No ecchymoses	  Extremities:     LABS:                          Thyroid Stimulating Hormone, Serum: 0.84 uIU/mL (05-24 @ 07:28)          Consultant(s) Notes Reviewed:      Care Discussed with Consultants/Other Providers:

## 2023-05-26 NOTE — PROGRESS NOTE ADULT - SUBJECTIVE AND OBJECTIVE BOX
DIABETES FOLLOW UP NOTE: Saw pt earlier today    Chief Complaint: Endocrine consult requested for management of T2DM    INTERVAL HX: Pt stable, tolerating POs with BG levels at goal while on present insulin doses. No hypoglycemia. On antibiotic for bacteremia  Pt states he feels well and wants to go home.      Review of Systems:  General: As above  Cardiovascular: No chest pain, palpitations  Respiratory: No SOB, no cough  GI: No nausea, vomiting, abdominal pain  Endocrine: No polyuria, polydipsia or S&Sx of hypoglycemia    Allergies    No Known Drug Allergies  shellfish (Unknown)    Intolerances      MEDICATIONS:  allopurinol 100 milliGRAM(s) Oral daily  atorvastatin 80 milliGRAM(s) Oral at bedtime  cefTRIAXone   IVPB 2000 milliGRAM(s) IV Intermittent every 24 hours  insulin glargine Injectable (LANTUS) 10 Unit(s) SubCutaneous at bedtime  insulin lispro (ADMELOG) corrective regimen sliding scale   SubCutaneous three times a day before meals  insulin lispro Injectable (ADMELOG) 3 Unit(s) SubCutaneous three times a day before meals    PHYSICAL EXAM:  VITALS: T(C): 36.2 (05-26-23 @ 13:24)  T(F): 97.2 (05-26-23 @ 13:24), Max: 98.5 (05-25-23 @ 21:08)  HR: 77 (05-26-23 @ 13:24) (75 - 79)  BP: 129/60 (05-26-23 @ 13:24) (129/60 - 160/81)  RR:  (17 - 20)  SpO2:  (95% - 100%)  Wt(kg): --  GENERAL: Male laying in bed in NAD. Wife at bedside  Abdomen: Soft, nontender, non distended, + central adiposity  Extremities: Warm, no edema in all 4 exts  NEURO: Alert and able to answer questions    LABS:  POCT Blood Glucose.: 160 mg/dL (05-26-23 @ 12:07)  POCT Blood Glucose.: 169 mg/dL (05-26-23 @ 07:59)  POCT Blood Glucose.: 203 mg/dL (05-25-23 @ 22:10)  POCT Blood Glucose.: 149 mg/dL (05-25-23 @ 17:00)  POCT Blood Glucose.: 145 mg/dL (05-25-23 @ 12:36)  POCT Blood Glucose.: 161 mg/dL (05-25-23 @ 08:28)  POCT Blood Glucose.: 144 mg/dL (05-24-23 @ 21:45)  POCT Blood Glucose.: 217 mg/dL (05-24-23 @ 11:32)  POCT Blood Glucose.: 130 mg/dL (05-24-23 @ 07:35)  POCT Blood Glucose.: 141 mg/dL (05-23-23 @ 22:32)  POCT Blood Glucose.: 205 mg/dL (05-23-23 @ 20:11)  POCT Blood Glucose.: 211 mg/dL (05-23-23 @ 19:07)                            9.5    7.81  )-----------( 375      ( 24 May 2023 07:28 )             31.5       05-24    139  |  97  |  58<H>  ----------------------------<  111<H>  3.2<L>   |  24  |  5.19<H>    eGFR: 12<L>    Ca    8.2<L>      05-24    Thyroid Function Tests:  05-24 @ 07:28 TSH 0.84 FreeT4 1.4 T3 -- Anti TPO -- Anti Thyroglobulin Ab -- TSI --      A1C with Estimated Average Glucose Result: 7.0 % (03-15-23 @ 07:27)      Estimated Average Glucose: 154 mg/dL (03-15-23 @ 07:27)

## 2023-05-26 NOTE — PROGRESS NOTE ADULT - PROBLEM SELECTOR PLAN 2
LDL goal <55 mg/dL  Continue with high intensity statin, currently taking atorvastatin 80 mg once daily at bedtime.  Check fasting lipid profile once annually.

## 2023-05-26 NOTE — PROGRESS NOTE ADULT - PROBLEM SELECTOR PLAN 1
Test BG ac and hs  -C/w Lantus 10 units at bedtime  -C/w Admelog 3 units with meals. Hold if not eating  -C/w low-dose sliding scale before every meal and at bedtime.    -Will adjust insulin as needed  Discharge:  Trulicity 0.75mg q week as per Dr Hernandez in last visit  (5/16/23)  Basal bolus, does to be determined.  Needs Optho/renal and cardiac follow up  Has apt with DR Hernandez endocrinologist 11/14/23.   Make sure pt has Rx for all DM supplies and insulin/ DM meds.

## 2023-05-26 NOTE — PROGRESS NOTE ADULT - NUTRITIONAL ASSESSMENT
Diet, Renal Restrictions:   For patients receiving Renal Replacement - No Protein Restr, No Conc K, No Conc Phos, Low Sodium  Consistent Carbohydrate {Evening Snack} (CSTCHOSN) (05-23-23 @ 12:09) [Active]      Needs RD consult

## 2023-05-26 NOTE — PROGRESS NOTE ADULT - SUBJECTIVE AND OBJECTIVE BOX
CC: f/u for pneumococcal bacteremia and pneumonia    Patient reports: dry cough,, no chest pain or dyspnea    REVIEW OF SYSTEMS:  All other review of systems negative (Comprehensive ROS)    Antimicrobials Day #  :day 4  cefTRIAXone   IVPB 2000 milliGRAM(s) IV Intermittent every 24 hours    Other Medications Reviewed    T(F): 97.2 (05-26-23 @ 13:24), Max: 98.5 (05-25-23 @ 21:08)  HR: 77 (05-26-23 @ 13:24)  BP: 129/60 (05-26-23 @ 13:24)  RR: 17 (05-26-23 @ 13:24)  SpO2: 97% (05-26-23 @ 13:24)  Wt(kg): --    PHYSICAL EXAM:  General: alert, no acute distress  Eyes:  anicteric, no conjunctival injection, no discharge  Oropharynx: no lesions or injection 	  Neck: supple, without adenopathy  Lungs: clear to auscultation  Heart: regular rate and rhythm; no murmur, rubs or gallops  Abdomen: soft, nondistended, nontender, without mass or organomegaly  Skin: no lesions  Extremities: no clubbing, cyanosis, or edema  Neurologic: alert, oriented, moves all extremities    LAB RESULTS:              MICROBIOLOGY:  RECENT CULTURES:  05-24 @ 05:10 .Blood Blood     No growth to date.      05-24 @ 05:06 .Blood Blood     No growth to date.      05-23 @ 11:20 .Blood Blood-Peripheral     Growth in aerobic and anaerobic bottles: Streptococcus pneumoniae  See previous culture [id number]    Growth in aerobic and anaerobic bottles: Gram Positive Cocci in Pairs and  Chains    05-23 @ 09:45 .Blood Blood-Peripheral Blood Culture PCR    Growth in aerobic and anaerobic bottles: Streptococcus pneumoniae  ***Blood Panel PCR results on this specimen are available  approximately 3 hours after the Gram stain result.***  Gram stain, PCR, and/or culture results may not always  correspond due to difference in methodologies.  ************************************************************  This PCR assay was performed by multiplex PCR. This  Assay tests for 66 bacterial and resistance gene targets.  Please refer to the [facility] test directory  at [email or web address] for details.    Growth in aerobic bottle: Gram Positive Cocci in Pairs and Chains  Growth in anaerobic bottle: Gram Positive Cocci in Pairs and Chains        RADIOLOGY REVIEWED:

## 2023-05-26 NOTE — PROGRESS NOTE ADULT - PROBLEM SELECTOR PLAN 3
BP goal <130/80  Management as per nephrology/cardiology.  Patient is currently on valsartan 40 mg once daily and carvedilol 12.5 mg every 12 hours.

## 2023-05-26 NOTE — PROGRESS NOTE ADULT - SUBJECTIVE AND OBJECTIVE BOX
Date of Service: 05-26-23 @ 10:28           CARDIOLOGY     PROGRESS  NOTE   ________________________________________________    CHIEF COMPLAINT:Patient is a 63y old  Male who presents with a chief complaint of fever, r/o sepsis (26 May 2023 09:56)  no complain, doing better  	  REVIEW OF SYSTEMS:  CONSTITUTIONAL: No fever, weight loss, or fatigue  EYES: No eye pain, visual disturbances, or discharge  ENT:  No difficulty hearing, tinnitus, vertigo; No sinus or throat pain  NECK: No pain or stiffness  RESPIRATORY: No cough, wheezing, chills or hemoptysis; No Shortness of Breath  CARDIOVASCULAR: No chest pain, palpitations, passing out, dizziness, or leg swelling  GASTROINTESTINAL: No abdominal or epigastric pain. No nausea, vomiting, or hematemesis; No diarrhea or constipation. No melena or hematochezia.  GENITOURINARY: No dysuria, frequency, hematuria, or incontinence  NEUROLOGICAL: No headaches, memory loss, loss of strength, numbness, or tremors  SKIN: No itching, burning, rashes, or lesions   LYMPH Nodes: No enlarged glands  ENDOCRINE: No heat or cold intolerance; No hair loss  MUSCULOSKELETAL: No joint pain or swelling; No muscle, back, or extremity pain  PSYCHIATRIC: No depression, anxiety, mood swings, or difficulty sleeping  HEME/LYMPH: No easy bruising, or bleeding gums  ALLERGY AND IMMUNOLOGIC: No hives or eczema	    [x ] All others negative	  [ ] Unable to obtain    PHYSICAL EXAM:  T(C): 36.8 (05-26-23 @ 04:43), Max: 37.5 (05-25-23 @ 12:52)  HR: 79 (05-26-23 @ 04:43) (71 - 79)  BP: 145/69 (05-26-23 @ 04:43) (136/74 - 160/81)  RR: 18 (05-26-23 @ 04:43) (18 - 20)  SpO2: 95% (05-26-23 @ 04:43) (95% - 100%)  Wt(kg): --  I&O's Summary    25 May 2023 07:01  -  26 May 2023 07:00  --------------------------------------------------------  IN: 0 mL / OUT: 200 mL / NET: -200 mL        Appearance: Normal	  HEENT:   Normal oral mucosa, PERRL, EOMI	  Lymphatic: No lymphadenopathy  Cardiovascular: Normal S1 S2, No JVD, + murmurs, No edema  Respiratory: rhonchi  Gastrointestinal:  Soft, Non-tender + BS	  Skin: No rashes, No ecchymoses, No cyanosis	  Neurologic: Non-focal  Extremities: no edema      MEDICATIONS  (STANDING):  acetaminophen   IVPB .. 1000 milliGRAM(s) IV Intermittent once  allopurinol 100 milliGRAM(s) Oral daily  aspirin  chewable 81 milliGRAM(s) Oral daily  atorvastatin 80 milliGRAM(s) Oral at bedtime  carvedilol 12.5 milliGRAM(s) Oral every 12 hours  cefTRIAXone   IVPB 2000 milliGRAM(s) IV Intermittent every 24 hours  chlorhexidine 2% Cloths 1 Application(s) Topical <User Schedule>  clopidogrel Tablet 75 milliGRAM(s) Oral daily  epoetin wayne-epbx (RETACRIT) Injectable 47053 Unit(s) IV Push once  heparin   Injectable 5000 Unit(s) SubCutaneous every 12 hours  insulin glargine Injectable (LANTUS) 10 Unit(s) SubCutaneous at bedtime  insulin lispro (ADMELOG) corrective regimen sliding scale   SubCutaneous three times a day before meals  insulin lispro Injectable (ADMELOG) 3 Unit(s) SubCutaneous three times a day before meals  senna 2 Tablet(s) Oral at bedtime  tamsulosin 0.4 milliGRAM(s) Oral at bedtime  valsartan 40 milliGRAM(s) Oral daily      TELEMETRY: 	    ECG:  	  RADIOLOGY:  OTHER: 	  	  LABS:	 	    CARDIAC MARKERS:        proBNP:   Lipid Profile:   HgA1c:   TSH: Thyroid Stimulating Hormone, Serum: 0.84 uIU/mL (05-24 @ 07:28)          Assessment and plan  ---------------------------  62yo M, hx of ESRD on HD MWF, CAD s/p stents, CHF, DM, presents with generalized weakness for 1 day. Per wife, patient seemed weak before dialysis yesterday but worsened after dialysis. Unable to stand up, very sleepy, poor appetite, seemed confused. Also with fever.  pt with sig cardiac hx , chf, cad s/p multiple stents , ESRD on HD , with generalized weakness r/o sepsis  ID eval appreciated  check cultures  abx as per ID  pt with hx of ashd, ischemic cardiomyopathy s/p stents continue all cardiac meds  asa/plavix  increase trop doubt cardiac pt with no cardiac complain ?increase sec to renal failure  ESRD renl eval  dvt prophylaxis  chest x ray, will consider ct  endocrine eval  + strep Bacteriemia ID appreciated/ echo noted may need RACHEL  ct chest pulmonary edema, increase fluid withdrawal as per renal  continue all cardiac meds  podiatry consult  continue abx, ID appreciated  will consider possibilty of RACHEL to r/o endocarditis  pt with HD catheter ?source will  discuss with ID  increase valsartan if increase bp  continue abx

## 2023-05-26 NOTE — PROGRESS NOTE ADULT - SUBJECTIVE AND OBJECTIVE BOX
[facility] [phone]        Patient seen and examined in bed.  He was in good spirits         MEDICATIONS  (STANDING):  acetaminophen   IVPB .. 1000 milliGRAM(s) IV Intermittent once  allopurinol 100 milliGRAM(s) Oral daily  aspirin  chewable 81 milliGRAM(s) Oral daily  atorvastatin 80 milliGRAM(s) Oral at bedtime  carvedilol 12.5 milliGRAM(s) Oral every 12 hours  cefTRIAXone   IVPB 2000 milliGRAM(s) IV Intermittent every 24 hours  chlorhexidine 2% Cloths 1 Application(s) Topical <User Schedule>  clopidogrel Tablet 75 milliGRAM(s) Oral daily  epoetin wayne-epbx (RETACRIT) Injectable 84614 Unit(s) IV Push once  heparin   Injectable 5000 Unit(s) SubCutaneous every 12 hours  insulin glargine Injectable (LANTUS) 10 Unit(s) SubCutaneous at bedtime  insulin lispro (ADMELOG) corrective regimen sliding scale   SubCutaneous three times a day before meals  insulin lispro Injectable (ADMELOG) 3 Unit(s) SubCutaneous three times a day before meals  senna 2 Tablet(s) Oral at bedtime  tamsulosin 0.4 milliGRAM(s) Oral at bedtime  valsartan 40 milliGRAM(s) Oral daily      VITAL:  T(C): , Max: 37.5 (05-25-23 @ 12:52)  T(F): , Max: 99.5 (05-25-23 @ 12:52)  HR: 79 (05-26-23 @ 04:43)  BP: 145/69 (05-26-23 @ 04:43)  BP(mean): --  RR: 18 (05-26-23 @ 04:43)  SpO2: 95% (05-26-23 @ 04:43)  Wt(kg): --    I and O's:    05-25 @ 07:01  -  05-26 @ 07:00  --------------------------------------------------------  IN: 0 mL / OUT: 200 mL / NET: -200 mL          PHYSICAL EXAM:    Constitutional: NAD  Neck:  No JVD  Respiratory: CTAB/L  Cardiovascular: S1 and S2  Gastrointestinal: BS+, soft, NT/ND  Extremities: No peripheral edema  Neurological:  no focal deficits  Psychiatric: Normal mood, normal affect  : No Jay  Skin: No rashes  Access: avf    LABS:                Urine Studies:          RADIOLOGY & ADDITIONAL STUDIES:          < from: CT Chest No Cont (05.24.23 @ 08:48) >    ACC: [id number] EXAM:  CT CHEST   ORDERED BY: MUNIR RENAE     PROCEDURE DATE:  05/24/2023          INTERPRETATION:  CLINICAL INFORMATION: Abnormal chest x-ray, fever. Strep   pneumoniae bacteremia. Rule out pneumonia. History of CAD status post   stents, ESRD on dialysis.    COMPARISON: CT chest 12/12/2022.    CONTRAST/COMPLICATIONS:  IV Contrast: NONE  Oral Contrast: NONE  Complications: None reported at time of study completion    PROCEDURE:  CT scan of the chest was obtained without intravenous contrast.    FINDINGS:    LYMPH NODES: No lymphadenopathy.    HEART/VASCULATURE: The heart is normal in size. Calcifications of the   aorta and coronary arteries. Status post coronary stents.  No pericardial   effusion.    AIRWAYS/LUNGS/PLEURA: Central airways are clear. Perihilar opacities of   the right upper, middle, and lower lobes. Small to moderate right pleural   effusion and small left pleural effusion with subadjacent atelectasis of   the bilateral lower lobes. Findings suspicious for congestive heart   failure exacerbation. Left pulmonary artery cardioMEMs device.    UPPER ABDOMEN: Unremarkable.    BONES/SOFT TISSUES: Unremarkable.    IMPRESSION:    Findings suggestive of pulmonary edema.    --- End of Report ---          JEANNA BLUNT MD; Resident Radiologist  This document has been electronically signed.  CODY SKAGGS MD; Attending Radiologist  This document has been electronically signed. May 24 2023 11:32AM    < end of copied text >

## 2023-05-27 LAB
GLUCOSE BLDC GLUCOMTR-MCNC: 129 MG/DL — HIGH (ref 70–99)
GLUCOSE BLDC GLUCOMTR-MCNC: 204 MG/DL — HIGH (ref 70–99)
GLUCOSE BLDC GLUCOMTR-MCNC: 219 MG/DL — HIGH (ref 70–99)
GLUCOSE BLDC GLUCOMTR-MCNC: 262 MG/DL — HIGH (ref 70–99)

## 2023-05-27 RX ADMIN — Medication 81 MILLIGRAM(S): at 12:01

## 2023-05-27 RX ADMIN — CARVEDILOL PHOSPHATE 12.5 MILLIGRAM(S): 80 CAPSULE, EXTENDED RELEASE ORAL at 17:29

## 2023-05-27 RX ADMIN — Medication 2: at 12:02

## 2023-05-27 RX ADMIN — HEPARIN SODIUM 5000 UNIT(S): 5000 INJECTION INTRAVENOUS; SUBCUTANEOUS at 05:50

## 2023-05-27 RX ADMIN — VALSARTAN 40 MILLIGRAM(S): 80 TABLET ORAL at 05:50

## 2023-05-27 RX ADMIN — Medication 100 MILLIGRAM(S): at 05:53

## 2023-05-27 RX ADMIN — Medication 100 MILLIGRAM(S): at 12:44

## 2023-05-27 RX ADMIN — ATORVASTATIN CALCIUM 80 MILLIGRAM(S): 80 TABLET, FILM COATED ORAL at 22:14

## 2023-05-27 RX ADMIN — Medication 3: at 17:28

## 2023-05-27 RX ADMIN — CLOPIDOGREL BISULFATE 75 MILLIGRAM(S): 75 TABLET, FILM COATED ORAL at 12:01

## 2023-05-27 RX ADMIN — Medication 100 MILLIGRAM(S): at 12:01

## 2023-05-27 RX ADMIN — CEFTRIAXONE 100 MILLIGRAM(S): 500 INJECTION, POWDER, FOR SOLUTION INTRAMUSCULAR; INTRAVENOUS at 12:03

## 2023-05-27 RX ADMIN — Medication 3 UNIT(S): at 12:03

## 2023-05-27 RX ADMIN — TAMSULOSIN HYDROCHLORIDE 0.4 MILLIGRAM(S): 0.4 CAPSULE ORAL at 22:13

## 2023-05-27 RX ADMIN — Medication 3 UNIT(S): at 17:28

## 2023-05-27 RX ADMIN — CARVEDILOL PHOSPHATE 12.5 MILLIGRAM(S): 80 CAPSULE, EXTENDED RELEASE ORAL at 05:49

## 2023-05-27 RX ADMIN — CHLORHEXIDINE GLUCONATE 1 APPLICATION(S): 213 SOLUTION TOPICAL at 06:41

## 2023-05-27 RX ADMIN — HEPARIN SODIUM 5000 UNIT(S): 5000 INJECTION INTRAVENOUS; SUBCUTANEOUS at 17:29

## 2023-05-27 RX ADMIN — Medication 100 MILLIGRAM(S): at 22:14

## 2023-05-27 RX ADMIN — Medication 3 UNIT(S): at 09:05

## 2023-05-27 RX ADMIN — INSULIN GLARGINE 10 UNIT(S): 100 INJECTION, SOLUTION SUBCUTANEOUS at 22:11

## 2023-05-27 NOTE — PROGRESS NOTE ADULT - SUBJECTIVE AND OBJECTIVE BOX
Date of Service: 05-27-23 @ 15:00           CARDIOLOGY     PROGRESS  NOTE   ________________________________________________    CHIEF COMPLAINT:Patient is a 63y old  Male who presents with a chief complaint of fever, r/o sepsis (27 May 2023 11:52)  no complain  	  REVIEW OF SYSTEMS:  CONSTITUTIONAL: No fever, weight loss, or fatigue  EYES: No eye pain, visual disturbances, or discharge  ENT:  No difficulty hearing, tinnitus, vertigo; No sinus or throat pain  NECK: No pain or stiffness  RESPIRATORY: No cough, wheezing, chills or hemoptysis;   + Shortness of Breath  CARDIOVASCULAR: No chest pain, palpitations, passing out, dizziness, or leg swelling  GASTROINTESTINAL: No abdominal or epigastric pain. No nausea, vomiting, or hematemesis; No diarrhea or constipation. No melena or hematochezia.  GENITOURINARY: No dysuria, frequency, hematuria, or incontinence  NEUROLOGICAL: No headaches, memory loss, loss of strength, numbness, or tremors  SKIN: No itching, burning, rashes, or lesions   LYMPH Nodes: No enlarged glands  ENDOCRINE: No heat or cold intolerance; No hair loss  MUSCULOSKELETAL: No joint pain or swelling; No muscle, back, or extremity pain  PSYCHIATRIC: No depression, anxiety, mood swings, or difficulty sleeping  HEME/LYMPH: No easy bruising, or bleeding gums  ALLERGY AND IMMUNOLOGIC: No hives or eczema	    [x ] All others negative	  [ ] Unable to obtain    PHYSICAL EXAM:  T(C): 36.7 (05-27-23 @ 13:55), Max: 37.3 (05-26-23 @ 20:57)  HR: 74 (05-27-23 @ 13:55) (73 - 80)  BP: 115/58 (05-27-23 @ 13:55) (115/58 - 159/96)  RR: 18 (05-27-23 @ 13:55) (18 - 20)  SpO2: 96% (05-27-23 @ 13:55) (95% - 97%)  Wt(kg): --  I&O's Summary      Appearance: Normal	  HEENT:   Normal oral mucosa, PERRL, EOMI	  Lymphatic: No lymphadenopathy  Cardiovascular: Normal S1 S2, No JVD, + murmurs, No edema  Respiratory: rhonchi  Psychiatry: A & O x 3, Mood & affect appropriate  Gastrointestinal:  Soft, Non-tender, + BS	  Skin: No rashes, No ecchymoses, No cyanosis	  Neurologic: Non-focal  Extremities: Normal range of motion, No clubbing, cyanosis or edema  Vascular: Peripheral pulses palpable 2+ bilaterally    MEDICATIONS  (STANDING):  acetaminophen   IVPB .. 1000 milliGRAM(s) IV Intermittent once  allopurinol 100 milliGRAM(s) Oral daily  aspirin  chewable 81 milliGRAM(s) Oral daily  atorvastatin 80 milliGRAM(s) Oral at bedtime  carvedilol 12.5 milliGRAM(s) Oral every 12 hours  cefTRIAXone   IVPB 2000 milliGRAM(s) IV Intermittent every 24 hours  chlorhexidine 2% Cloths 1 Application(s) Topical <User Schedule>  clopidogrel Tablet 75 milliGRAM(s) Oral daily  epoetin wayne-epbx (RETACRIT) Injectable 88149 Unit(s) IV Push once  heparin   Injectable 5000 Unit(s) SubCutaneous every 12 hours  insulin glargine Injectable (LANTUS) 10 Unit(s) SubCutaneous at bedtime  insulin lispro (ADMELOG) corrective regimen sliding scale   SubCutaneous three times a day before meals  insulin lispro Injectable (ADMELOG) 3 Unit(s) SubCutaneous three times a day before meals  senna 2 Tablet(s) Oral at bedtime  tamsulosin 0.4 milliGRAM(s) Oral at bedtime  valsartan 40 milliGRAM(s) Oral daily      TELEMETRY: 	    ECG:  	  RADIOLOGY:  OTHER: 	  	  LABS:	 	    CARDIAC MARKERS:    proBNP:   Lipid Profile:   HgA1c:   TSH: Thyroid Stimulating Hormone, Serum: 0.84 uIU/mL (05-24 @ 07:28)    1. CTX IV while in hospital  2.Can consider discharge planning from ID viewpoint. I would use levaquin 500 po every other day to complete 2 weeks total. Today is day 5/14.I think he is a candidate from ID standpoint for discharge planning.      Assessment and plan  ---------------------------  62yo M, hx of ESRD on HD MWF, CAD s/p stents, CHF, DM, presents with generalized weakness for 1 day. Per wife, patient seemed weak before dialysis yesterday but worsened after dialysis. Unable to stand up, very sleepy, poor appetite, seemed confused. Also with fever.  pt with sig cardiac hx , chf, cad s/p multiple stents , ESRD on HD , with generalized weakness r/o sepsis  ID eval appreciated  check cultures  abx as per ID  pt with hx of ashd, ischemic cardiomyopathy s/p stents continue all cardiac meds  asa/plavix  increase trop doubt cardiac pt with no cardiac complain ?increase sec to renal failure  ESRD renl eval  dvt prophylaxis  chest x ray, will consider ct  endocrine eval  + strep Bacteriemia ID appreciated/ echo noted may need RACHEL  ct chest pulmonary edema, increase fluid withdrawal as per renal  continue all cardiac meds  podiatry consult  continue abx, ID appreciated  will consider possibilty of RACHEL to r/o endocarditis  pt with HD catheter ?source will  discuss with ID  increase valsartan if increase bp  continue abx, ID noted

## 2023-05-27 NOTE — PROGRESS NOTE ADULT - SUBJECTIVE AND OBJECTIVE BOX
CC: f/u for bacteremic pneumococcal pneumonia    Patient reports: he is feeling better, still with cough, no dyspnea    REVIEW OF SYSTEMS:  All other review of systems negative (Comprehensive ROS)    Antimicrobials Day #  :day 5/14  cefTRIAXone   IVPB 2000 milliGRAM(s) IV Intermittent every 24 hours    Other Medications Reviewed  MEDICATIONS  (STANDING):  acetaminophen   IVPB .. 1000 milliGRAM(s) IV Intermittent once  allopurinol 100 milliGRAM(s) Oral daily  aspirin  chewable 81 milliGRAM(s) Oral daily  atorvastatin 80 milliGRAM(s) Oral at bedtime  carvedilol 12.5 milliGRAM(s) Oral every 12 hours  cefTRIAXone   IVPB 2000 milliGRAM(s) IV Intermittent every 24 hours  chlorhexidine 2% Cloths 1 Application(s) Topical <User Schedule>  clopidogrel Tablet 75 milliGRAM(s) Oral daily  epoetin wayne-epbx (RETACRIT) Injectable 44041 Unit(s) IV Push once  heparin   Injectable 5000 Unit(s) SubCutaneous every 12 hours  insulin glargine Injectable (LANTUS) 10 Unit(s) SubCutaneous at bedtime  insulin lispro (ADMELOG) corrective regimen sliding scale   SubCutaneous three times a day before meals  insulin lispro Injectable (ADMELOG) 3 Unit(s) SubCutaneous three times a day before meals  senna 2 Tablet(s) Oral at bedtime  tamsulosin 0.4 milliGRAM(s) Oral at bedtime  valsartan 40 milliGRAM(s) Oral daily    T(F): 97.2 (05-27-23 @ 05:09), Max: 99.1 (05-26-23 @ 20:57)  HR: 80 (05-27-23 @ 05:09)  BP: 134/76 (05-27-23 @ 05:09)  RR: 18 (05-27-23 @ 05:09)  SpO2: 95% (05-27-23 @ 05:09)  Wt(kg): --    PHYSICAL EXAM:  General: alert, no acute distress  Eyes:  anicteric, no conjunctival injection, no discharge  Oropharynx: no lesions or injection 	  Neck: supple, without adenopathy  Lungs: clear to auscultation  Heart: regular rate and rhythm; no murmur, rubs or gallops  Abdomen: soft, nondistended, nontender, without mass or organomegaly  Skin: no lesions  Extremities: no clubbing, cyanosis, or edema  Neurologic: alert, oriented, moves all extremities    LAB RESULTS:              MICROBIOLOGY:  RECENT CULTURES:  05-24 @ 05:10 .Blood Blood     No growth to date.      05-24 @ 05:06 .Blood Blood     No growth to date.      05-23 @ 11:20 .Blood Blood-Peripheral     Growth in aerobic and anaerobic bottles: Streptococcus pneumoniae  See previous culture [id number]    Growth in aerobic and anaerobic bottles: Gram Positive Cocci in Pairs and  Chains    05-23 @ 09:45 .Blood Blood-Peripheral Blood Culture PCR  Streptococcus pneumoniae    Growth in aerobic and anaerobic bottles: Streptococcus pneumoniae  ***Blood Panel PCR results on this specimen are available  approximately 3 hours after the Gram stain result.***  Gram stain, PCR, and/or culture results may not always  correspond due to difference in methodologies.  ************************************************************  This PCR assay was performed by multiplex PCR. This  Assay tests for 66 bacterial and resistance gene targets.  Please refer to the [facility] test directory  at [email or web address] for details.    Growth in aerobic bottle: Gram Positive Cocci in Pairs and Chains  Growth in anaerobic bottle: Gram Positive Cocci in Pairs and Chains        RADIOLOGY REVIEWED:

## 2023-05-27 NOTE — PROGRESS NOTE ADULT - SUBJECTIVE AND OBJECTIVE BOX
date of service: 05-27-23 @ 11:52    REVIEW OF SYSTEMS:  CONSTITUTIONAL: No fever,  no  weight loss  ENT:  No  tinnitus,   no   vertigo  NECK: No pain or stiffness  RESPIRATORY: No cough, wheezing, chills or hemoptysis;    No Shortness of Breath  CARDIOVASCULAR: No chest pain, palpitations, dizziness  GASTROINTESTINAL: No abdominal or epigastric pain. No nausea, vomiting, or hematemesis; No diarrhea  No melena or hematochezia.  GENITOURINARY: No dysuria, frequency, hematuria, or incontinence  NEUROLOGICAL: No headaches  SKIN: No itching,  no   rash  LYMPH Nodes: No enlarged glands  ENDOCRINE: No heat or cold intolerance  MUSCULOSKELETAL: No joint pain or swelling  PSYCHIATRIC: No depression, anxiety  HEME/LYMPH: No easy bruising, or bleeding gums  ALLERGY AND IMMUNOLOGIC: No hives or eczema	    MEDICATIONS  (STANDING):  acetaminophen   IVPB .. 1000 milliGRAM(s) IV Intermittent once  allopurinol 100 milliGRAM(s) Oral daily  aspirin  chewable 81 milliGRAM(s) Oral daily  atorvastatin 80 milliGRAM(s) Oral at bedtime  carvedilol 12.5 milliGRAM(s) Oral every 12 hours  cefTRIAXone   IVPB 2000 milliGRAM(s) IV Intermittent every 24 hours  chlorhexidine 2% Cloths 1 Application(s) Topical <User Schedule>  clopidogrel Tablet 75 milliGRAM(s) Oral daily  epoetin wayne-epbx (RETACRIT) Injectable 20392 Unit(s) IV Push once  heparin   Injectable 5000 Unit(s) SubCutaneous every 12 hours  insulin glargine Injectable (LANTUS) 10 Unit(s) SubCutaneous at bedtime  insulin lispro (ADMELOG) corrective regimen sliding scale   SubCutaneous three times a day before meals  insulin lispro Injectable (ADMELOG) 3 Unit(s) SubCutaneous three times a day before meals  senna 2 Tablet(s) Oral at bedtime  tamsulosin 0.4 milliGRAM(s) Oral at bedtime  valsartan 40 milliGRAM(s) Oral daily    MEDICATIONS  (PRN):  guaiFENesin Oral Liquid (Sugar-Free) 100 milliGRAM(s) Oral every 6 hours PRN Cough      Vital Signs Last 24 Hrs  T(C): 36.2 (27 May 2023 05:09), Max: 37.3 (26 May 2023 20:57)  T(F): 97.2 (27 May 2023 05:09), Max: 99.1 (26 May 2023 20:57)  HR: 80 (27 May 2023 05:09) (73 - 80)  BP: 134/76 (27 May 2023 05:09) (116/68 - 159/96)  BP(mean): --  RR: 18 (27 May 2023 05:09) (17 - 20)  SpO2: 95% (27 May 2023 05:09) (95% - 97%)    Parameters below as of 27 May 2023 05:09  Patient On (Oxygen Delivery Method): room air      CAPILLARY BLOOD GLUCOSE      POCT Blood Glucose.: 129 mg/dL (27 May 2023 08:54)  POCT Blood Glucose.: 125 mg/dL (26 May 2023 22:29)  POCT Blood Glucose.: 95 mg/dL (26 May 2023 21:54)  POCT Blood Glucose.: 115 mg/dL (26 May 2023 17:10)  POCT Blood Glucose.: 160 mg/dL (26 May 2023 12:07)    I&O's Summary        Appearance: Normal	  HEENT:   Normal oral mucosa, PERRL, EOMI	  Lymphatic: No lymphadenopathy  Cardiovascular: Normal S1 S2, No JVD  Respiratory: Lungs clear to auscultation	  Gastrointestinal:  Soft, Non-tender, + BS	  Skin: No rash, No ecchymoses	  Extremities:     LABS:                          Thyroid Stimulating Hormone, Serum: 0.84 uIU/mL (05-24 @ 07:28)          Consultant(s) Notes Reviewed:      Care Discussed with Consultants/Other Providers:

## 2023-05-28 LAB
GLUCOSE BLDC GLUCOMTR-MCNC: 163 MG/DL — HIGH (ref 70–99)
GLUCOSE BLDC GLUCOMTR-MCNC: 182 MG/DL — HIGH (ref 70–99)
GLUCOSE BLDC GLUCOMTR-MCNC: 191 MG/DL — HIGH (ref 70–99)
GLUCOSE BLDC GLUCOMTR-MCNC: 298 MG/DL — HIGH (ref 70–99)

## 2023-05-28 RX ORDER — INSULIN LISPRO 100/ML
5 VIAL (ML) SUBCUTANEOUS
Refills: 0 | Status: DISCONTINUED | OUTPATIENT
Start: 2023-05-28 | End: 2023-05-29

## 2023-05-28 RX ORDER — INSULIN GLARGINE 100 [IU]/ML
10 INJECTION, SOLUTION SUBCUTANEOUS AT BEDTIME
Refills: 0 | Status: DISCONTINUED | OUTPATIENT
Start: 2023-05-28 | End: 2023-05-29

## 2023-05-28 RX ADMIN — CARVEDILOL PHOSPHATE 12.5 MILLIGRAM(S): 80 CAPSULE, EXTENDED RELEASE ORAL at 17:22

## 2023-05-28 RX ADMIN — VALSARTAN 40 MILLIGRAM(S): 80 TABLET ORAL at 05:22

## 2023-05-28 RX ADMIN — SENNA PLUS 2 TABLET(S): 8.6 TABLET ORAL at 21:53

## 2023-05-28 RX ADMIN — CARVEDILOL PHOSPHATE 12.5 MILLIGRAM(S): 80 CAPSULE, EXTENDED RELEASE ORAL at 05:22

## 2023-05-28 RX ADMIN — HEPARIN SODIUM 5000 UNIT(S): 5000 INJECTION INTRAVENOUS; SUBCUTANEOUS at 17:22

## 2023-05-28 RX ADMIN — TAMSULOSIN HYDROCHLORIDE 0.4 MILLIGRAM(S): 0.4 CAPSULE ORAL at 21:52

## 2023-05-28 RX ADMIN — Medication 3: at 12:18

## 2023-05-28 RX ADMIN — CLOPIDOGREL BISULFATE 75 MILLIGRAM(S): 75 TABLET, FILM COATED ORAL at 11:44

## 2023-05-28 RX ADMIN — HEPARIN SODIUM 5000 UNIT(S): 5000 INJECTION INTRAVENOUS; SUBCUTANEOUS at 05:22

## 2023-05-28 RX ADMIN — Medication 5 UNIT(S): at 12:17

## 2023-05-28 RX ADMIN — Medication 100 MILLIGRAM(S): at 05:23

## 2023-05-28 RX ADMIN — Medication 5 UNIT(S): at 17:23

## 2023-05-28 RX ADMIN — CHLORHEXIDINE GLUCONATE 1 APPLICATION(S): 213 SOLUTION TOPICAL at 05:23

## 2023-05-28 RX ADMIN — CEFTRIAXONE 100 MILLIGRAM(S): 500 INJECTION, POWDER, FOR SOLUTION INTRAMUSCULAR; INTRAVENOUS at 12:19

## 2023-05-28 RX ADMIN — Medication 100 MILLIGRAM(S): at 11:44

## 2023-05-28 RX ADMIN — Medication 1: at 17:22

## 2023-05-28 RX ADMIN — ATORVASTATIN CALCIUM 80 MILLIGRAM(S): 80 TABLET, FILM COATED ORAL at 21:52

## 2023-05-28 RX ADMIN — INSULIN GLARGINE 10 UNIT(S): 100 INJECTION, SOLUTION SUBCUTANEOUS at 21:53

## 2023-05-28 RX ADMIN — Medication 1: at 08:33

## 2023-05-28 RX ADMIN — Medication 81 MILLIGRAM(S): at 11:44

## 2023-05-28 RX ADMIN — Medication 3 UNIT(S): at 08:32

## 2023-05-28 NOTE — PROGRESS NOTE ADULT - SUBJECTIVE AND OBJECTIVE BOX
Date of Service: 05-28-23 @ 12:10           CARDIOLOGY     PROGRESS  NOTE   ________________________________________________    CHIEF COMPLAINT:Patient is a 63y old  Male who presents with a chief complaint of fever, r/o sepsis (28 May 2023 09:30)  no complain  	  REVIEW OF SYSTEMS:  CONSTITUTIONAL: No fever, weight loss, or fatigue  EYES: No eye pain, visual disturbances, or discharge  ENT:  No difficulty hearing, tinnitus, vertigo; No sinus or throat pain  NECK: No pain or stiffness  RESPIRATORY: No cough, wheezing, chills or hemoptysis; No Shortness of Breath  CARDIOVASCULAR: No chest pain, palpitations, passing out, dizziness, or leg swelling  GASTROINTESTINAL: No abdominal or epigastric pain. No nausea, vomiting, or hematemesis; No diarrhea or constipation. No melena or hematochezia.  GENITOURINARY: No dysuria, frequency, hematuria, or incontinence  NEUROLOGICAL: No headaches, memory loss, loss of strength, numbness, or tremors  SKIN: No itching, burning, rashes, or lesions   LYMPH Nodes: No enlarged glands  ENDOCRINE: No heat or cold intolerance; No hair loss  MUSCULOSKELETAL: No joint pain or swelling; No muscle, back, or extremity pain  PSYCHIATRIC: No depression, anxiety, mood swings, or difficulty sleeping  HEME/LYMPH: No easy bruising, or bleeding gums  ALLERGY AND IMMUNOLOGIC: No hives or eczema	    [x ] All others negative	  [ ] Unable to obtain    PHYSICAL EXAM:  T(C): 36.4 (05-28-23 @ 04:48), Max: 36.8 (05-27-23 @ 21:25)  HR: 69 (05-28-23 @ 11:00) (69 - 76)  BP: 168/96 (05-28-23 @ 11:00) (115/58 - 168/96)  RR: 18 (05-28-23 @ 11:00) (18 - 20)  SpO2: 97% (05-28-23 @ 11:00) (96% - 97%)  Wt(kg): --  I&O's Summary      Appearance: Normal	  HEENT:   Normal oral mucosa, PERRL, EOMI	  Lymphatic: No lymphadenopathy  Cardiovascular: Normal S1 S2, No JVD, + murmurs, No edema  Respiratory: rhonchi  Psychiatry: A & O x 3, Mood & affect appropriate  Gastrointestinal:  Soft, Non-tender, + BS	  Skin: No rashes, No ecchymoses, No cyanosis	  Neurologic: Non-focal  Extremities: Normal range of motion, No clubbing, cyanosis or edema  Vascular: Peripheral pulses palpable 2+ bilaterally    MEDICATIONS  (STANDING):  acetaminophen   IVPB .. 1000 milliGRAM(s) IV Intermittent once  allopurinol 100 milliGRAM(s) Oral daily  aspirin  chewable 81 milliGRAM(s) Oral daily  atorvastatin 80 milliGRAM(s) Oral at bedtime  carvedilol 12.5 milliGRAM(s) Oral every 12 hours  cefTRIAXone   IVPB 2000 milliGRAM(s) IV Intermittent every 24 hours  chlorhexidine 2% Cloths 1 Application(s) Topical <User Schedule>  clopidogrel Tablet 75 milliGRAM(s) Oral daily  epoetin wayne-epbx (RETACRIT) Injectable 91645 Unit(s) IV Push once  heparin   Injectable 5000 Unit(s) SubCutaneous every 12 hours  insulin glargine Injectable (LANTUS) 10 Unit(s) SubCutaneous at bedtime  insulin lispro (ADMELOG) corrective regimen sliding scale   SubCutaneous three times a day before meals  insulin lispro Injectable (ADMELOG) 5 Unit(s) SubCutaneous three times a day before meals  senna 2 Tablet(s) Oral at bedtime  tamsulosin 0.4 milliGRAM(s) Oral at bedtime  valsartan 40 milliGRAM(s) Oral daily      TELEMETRY: 	    ECG:  	  RADIOLOGY:  OTHER: 	  	  LABS:	 	    CARDIAC MARKERS:                  proBNP:   Lipid Profile:   HgA1c:   TSH: Thyroid Stimulating Hormone, Serum: 0.84 uIU/mL (05-24 @ 07:28)    Culture - Blood (05.24.23 @ 05:10)    Specimen Source: .Blood Blood   Culture Results:   No growth to date.    1. CTX IV while in hospital  2.Can consider discharge planning from ID viewpoint. I would use levaquin 500 po every other day to complete 2 weeks total. Today is day 6/14.I think he is a candidate from ID standpoint for discharge planning.  3. He appears to be clinically responding to CTX    Assessment and plan  ---------------------------  64yo M, hx of ESRD on HD MWF, CAD s/p stents, CHF, DM, presents with generalized weakness for 1 day. Per wife, patient seemed weak before dialysis yesterday but worsened after dialysis. Unable to stand up, very sleepy, poor appetite, seemed confused. Also with fever.  pt with sig cardiac hx , chf, cad s/p multiple stents , ESRD on HD , with generalized weakness r/o sepsis  ID eval appreciated  check cultures  abx as per ID  pt with hx of ashd, ischemic cardiomyopathy s/p stents continue all cardiac meds  asa/plavix  increase trop doubt cardiac pt with no cardiac complain ?increase sec to renal failure  ESRD renl eval  dvt prophylaxis  chest x ray, will consider ct  endocrine eval  + strep Bacteriemia ID appreciated/ echo noted may need RACHEL  ct chest pulmonary edema, increase fluid withdrawal as per renal  continue all cardiac meds  podiatry consult  continue abx, ID appreciated  will consider possibilty of RACHEL to r/o endocarditis  pt with HD catheter ?source will  discuss with ID  increase valsartan if increase bp  repeat culture   continue abx, ID noted  dc planning after HD  physical therapy

## 2023-05-28 NOTE — PROGRESS NOTE ADULT - SUBJECTIVE AND OBJECTIVE BOX
CC: f/u for pneumococcal pneumonia    Patient reports: mild cough, no other complaints    REVIEW OF SYSTEMS:  All other review of systems negative (Comprehensive ROS)    Antimicrobials Day #  :day 6/14  cefTRIAXone   IVPB 2000 milliGRAM(s) IV Intermittent every 24 hours    Other Medications Reviewed  MEDICATIONS  (STANDING):  acetaminophen   IVPB .. 1000 milliGRAM(s) IV Intermittent once  allopurinol 100 milliGRAM(s) Oral daily  aspirin  chewable 81 milliGRAM(s) Oral daily  atorvastatin 80 milliGRAM(s) Oral at bedtime  carvedilol 12.5 milliGRAM(s) Oral every 12 hours  cefTRIAXone   IVPB 2000 milliGRAM(s) IV Intermittent every 24 hours  chlorhexidine 2% Cloths 1 Application(s) Topical <User Schedule>  clopidogrel Tablet 75 milliGRAM(s) Oral daily  epoetin wayne-epbx (RETACRIT) Injectable 26579 Unit(s) IV Push once  heparin   Injectable 5000 Unit(s) SubCutaneous every 12 hours  insulin glargine Injectable (LANTUS) 10 Unit(s) SubCutaneous at bedtime  insulin lispro (ADMELOG) corrective regimen sliding scale   SubCutaneous three times a day before meals  insulin lispro Injectable (ADMELOG) 3 Unit(s) SubCutaneous three times a day before meals  senna 2 Tablet(s) Oral at bedtime  tamsulosin 0.4 milliGRAM(s) Oral at bedtime  valsartan 40 milliGRAM(s) Oral daily    T(F): 97.5 (05-28-23 @ 04:48), Max: 98.2 (05-27-23 @ 21:25)  HR: 71 (05-28-23 @ 04:48)  BP: 149/74 (05-28-23 @ 04:48)  RR: 18 (05-28-23 @ 04:48)  SpO2: 96% (05-28-23 @ 04:48)  Wt(kg): --    PHYSICAL EXAM:  General: alert, no acute distress  Eyes:  anicteric, no conjunctival injection, no discharge  Oropharynx: no lesions or injection 	  Neck: supple, without adenopathy  Lungs: clear to auscultation  Heart: regular rate and rhythm; no murmur, rubs or gallops  Abdomen: soft, nondistended, nontender, without mass or organomegaly  Skin: no lesions  Extremities: no clubbing, cyanosis, or edema  Neurologic: alert, oriented, moves all extremities    LAB RESULTS:              MICROBIOLOGY:  RECENT CULTURES:  05-24 @ 05:10 .Blood Blood     No growth to date.      05-24 @ 05:06 .Blood Blood     No growth to date.      05-23 @ 11:20 .Blood Blood-Peripheral     Growth in aerobic and anaerobic bottles: Streptococcus pneumoniae  See previous culture [id number]    Growth in aerobic and anaerobic bottles: Gram Positive Cocci in Pairs and  Chains    05-23 @ 09:45 .Blood Blood-Peripheral Blood Culture PCR  Streptococcus pneumoniae    Growth in aerobic and anaerobic bottles: Streptococcus pneumoniae  ***Blood Panel PCR results on this specimen are available  approximately 3 hours after the Gram stain result.***  Gram stain, PCR, and/or culture results may not always  correspond due to difference in methodologies.  ************************************************************  This PCR assay was performed by multiplex PCR. This  Assay tests for 66 bacterial and resistance gene targets.  Please refer to the [facility] test directory  at [email or web address] for details.    Growth in aerobic bottle: Gram Positive Cocci in Pairs and Chains  Growth in anaerobic bottle: Gram Positive Cocci in Pairs and Chains        RADIOLOGY REVIEWED:

## 2023-05-28 NOTE — PROGRESS NOTE ADULT - SUBJECTIVE AND OBJECTIVE BOX
date of service: 05-28-23 @ 09:30  afberile  REVIEW OF SYSTEMS:  CONSTITUTIONAL: No fever,  no  weight loss  ENT:  No  tinnitus,   no   vertigo  NECK: No pain or stiffness  RESPIRATORY: No cough, wheezing, chills or hemoptysis;    No Shortness of Breath  CARDIOVASCULAR: No chest pain, palpitations, dizziness  GASTROINTESTINAL: No abdominal or epigastric pain. No nausea, vomiting, or hematemesis; No diarrhea  No melena or hematochezia.  GENITOURINARY: No dysuria, frequency, hematuria, or incontinence  NEUROLOGICAL: No headaches  SKIN: No itching,  no   rash  LYMPH Nodes: No enlarged glands  ENDOCRINE: No heat or cold intolerance  MUSCULOSKELETAL: No joint pain or swelling  PSYCHIATRIC: No depression, anxiety  HEME/LYMPH: No easy bruising, or bleeding gums  ALLERGY AND IMMUNOLOGIC: No hives or eczema	    MEDICATIONS  (STANDING):  acetaminophen   IVPB .. 1000 milliGRAM(s) IV Intermittent once  allopurinol 100 milliGRAM(s) Oral daily  aspirin  chewable 81 milliGRAM(s) Oral daily  atorvastatin 80 milliGRAM(s) Oral at bedtime  carvedilol 12.5 milliGRAM(s) Oral every 12 hours  cefTRIAXone   IVPB 2000 milliGRAM(s) IV Intermittent every 24 hours  chlorhexidine 2% Cloths 1 Application(s) Topical <User Schedule>  clopidogrel Tablet 75 milliGRAM(s) Oral daily  epoetin wayne-epbx (RETACRIT) Injectable 69296 Unit(s) IV Push once  heparin   Injectable 5000 Unit(s) SubCutaneous every 12 hours  insulin glargine Injectable (LANTUS) 10 Unit(s) SubCutaneous at bedtime  insulin lispro (ADMELOG) corrective regimen sliding scale   SubCutaneous three times a day before meals  insulin lispro Injectable (ADMELOG) 3 Unit(s) SubCutaneous three times a day before meals  senna 2 Tablet(s) Oral at bedtime  tamsulosin 0.4 milliGRAM(s) Oral at bedtime  valsartan 40 milliGRAM(s) Oral daily    MEDICATIONS  (PRN):  guaiFENesin Oral Liquid (Sugar-Free) 100 milliGRAM(s) Oral every 6 hours PRN Cough      Vital Signs Last 24 Hrs  T(C): 36.4 (28 May 2023 04:48), Max: 36.8 (27 May 2023 21:25)  T(F): 97.5 (28 May 2023 04:48), Max: 98.2 (27 May 2023 21:25)  HR: 71 (28 May 2023 04:48) (71 - 76)  BP: 149/74 (28 May 2023 04:48) (115/58 - 164/83)  BP(mean): --  RR: 18 (28 May 2023 04:48) (18 - 20)  SpO2: 96% (28 May 2023 04:48) (96% - 97%)    Parameters below as of 28 May 2023 04:48  Patient On (Oxygen Delivery Method): room air      CAPILLARY BLOOD GLUCOSE      POCT Blood Glucose.: 191 mg/dL (28 May 2023 08:32)  POCT Blood Glucose.: 204 mg/dL (27 May 2023 21:50)  POCT Blood Glucose.: 262 mg/dL (27 May 2023 16:59)  POCT Blood Glucose.: 219 mg/dL (27 May 2023 11:56)    I&O's Summary        Appearance: Normal	  HEENT:   Normal oral mucosa, PERRL, EOMI	  Lymphatic: No lymphadenopathy  Cardiovascular: Normal S1 S2, No JVD  Respiratory: Lungs clear to auscultation	  Gastrointestinal:  Soft, Non-tender, + BS	  Skin: No rash, No ecchymoses	  Extremities:     LABS:                          Thyroid Stimulating Hormone, Serum: 0.84 uIU/mL (05-24 @ 07:28)          Consultant(s) Notes Reviewed:      Care Discussed with Consultants/Other Providers:

## 2023-05-29 ENCOUNTER — TRANSCRIPTION ENCOUNTER (OUTPATIENT)
Age: 63
End: 2023-05-29

## 2023-05-29 VITALS
RESPIRATION RATE: 18 BRPM | TEMPERATURE: 98 F | OXYGEN SATURATION: 96 % | SYSTOLIC BLOOD PRESSURE: 119 MMHG | DIASTOLIC BLOOD PRESSURE: 64 MMHG | HEART RATE: 79 BPM

## 2023-05-29 LAB
ANION GAP SERPL CALC-SCNC: 24 MMOL/L — HIGH (ref 5–17)
BUN SERPL-MCNC: 85 MG/DL — HIGH (ref 7–23)
CALCIUM SERPL-MCNC: 8.7 MG/DL — SIGNIFICANT CHANGE UP (ref 8.4–10.5)
CHLORIDE SERPL-SCNC: 97 MMOL/L — SIGNIFICANT CHANGE UP (ref 96–108)
CO2 SERPL-SCNC: 16 MMOL/L — LOW (ref 22–31)
CREAT SERPL-MCNC: 6.56 MG/DL — HIGH (ref 0.5–1.3)
CULTURE RESULTS: SIGNIFICANT CHANGE UP
CULTURE RESULTS: SIGNIFICANT CHANGE UP
EGFR: 9 ML/MIN/1.73M2 — LOW
GLUCOSE BLDC GLUCOMTR-MCNC: 125 MG/DL — HIGH (ref 70–99)
GLUCOSE BLDC GLUCOMTR-MCNC: 171 MG/DL — HIGH (ref 70–99)
GLUCOSE BLDC GLUCOMTR-MCNC: 211 MG/DL — HIGH (ref 70–99)
GLUCOSE SERPL-MCNC: 210 MG/DL — HIGH (ref 70–99)
HCT VFR BLD CALC: 38.4 % — LOW (ref 39–50)
HGB BLD-MCNC: 12.1 G/DL — LOW (ref 13–17)
MCHC RBC-ENTMCNC: 27.8 PG — SIGNIFICANT CHANGE UP (ref 27–34)
MCHC RBC-ENTMCNC: 31.5 GM/DL — LOW (ref 32–36)
MCV RBC AUTO: 88.3 FL — SIGNIFICANT CHANGE UP (ref 80–100)
NRBC # BLD: 0 /100 WBCS — SIGNIFICANT CHANGE UP (ref 0–0)
PLATELET # BLD AUTO: 322 K/UL — SIGNIFICANT CHANGE UP (ref 150–400)
POTASSIUM SERPL-MCNC: 4.4 MMOL/L — SIGNIFICANT CHANGE UP (ref 3.5–5.3)
POTASSIUM SERPL-SCNC: 4.4 MMOL/L — SIGNIFICANT CHANGE UP (ref 3.5–5.3)
RBC # BLD: 4.35 M/UL — SIGNIFICANT CHANGE UP (ref 4.2–5.8)
RBC # FLD: 15.3 % — HIGH (ref 10.3–14.5)
SODIUM SERPL-SCNC: 137 MMOL/L — SIGNIFICANT CHANGE UP (ref 135–145)
SPECIMEN SOURCE: SIGNIFICANT CHANGE UP
SPECIMEN SOURCE: SIGNIFICANT CHANGE UP
WBC # BLD: 7.48 K/UL — SIGNIFICANT CHANGE UP (ref 3.8–10.5)
WBC # FLD AUTO: 7.48 K/UL — SIGNIFICANT CHANGE UP (ref 3.8–10.5)

## 2023-05-29 PROCEDURE — 84132 ASSAY OF SERUM POTASSIUM: CPT

## 2023-05-29 PROCEDURE — 70450 CT HEAD/BRAIN W/O DYE: CPT | Mod: MA

## 2023-05-29 PROCEDURE — 87150 DNA/RNA AMPLIFIED PROBE: CPT

## 2023-05-29 PROCEDURE — 87184 SC STD DISK METHOD PER PLATE: CPT

## 2023-05-29 PROCEDURE — 85027 COMPLETE CBC AUTOMATED: CPT

## 2023-05-29 PROCEDURE — 71250 CT THORAX DX C-: CPT

## 2023-05-29 PROCEDURE — 85018 HEMOGLOBIN: CPT

## 2023-05-29 PROCEDURE — 85610 PROTHROMBIN TIME: CPT

## 2023-05-29 PROCEDURE — 87640 STAPH A DNA AMP PROBE: CPT

## 2023-05-29 PROCEDURE — 93306 TTE W/DOPPLER COMPLETE: CPT

## 2023-05-29 PROCEDURE — 80053 COMPREHEN METABOLIC PANEL: CPT

## 2023-05-29 PROCEDURE — 84439 ASSAY OF FREE THYROXINE: CPT

## 2023-05-29 PROCEDURE — 84484 ASSAY OF TROPONIN QUANT: CPT

## 2023-05-29 PROCEDURE — 84443 ASSAY THYROID STIM HORMONE: CPT

## 2023-05-29 PROCEDURE — 99285 EMERGENCY DEPT VISIT HI MDM: CPT

## 2023-05-29 PROCEDURE — 83605 ASSAY OF LACTIC ACID: CPT

## 2023-05-29 PROCEDURE — 82330 ASSAY OF CALCIUM: CPT

## 2023-05-29 PROCEDURE — 80048 BASIC METABOLIC PNL TOTAL CA: CPT

## 2023-05-29 PROCEDURE — 82435 ASSAY OF BLOOD CHLORIDE: CPT

## 2023-05-29 PROCEDURE — 85014 HEMATOCRIT: CPT

## 2023-05-29 PROCEDURE — 0225U NFCT DS DNA&RNA 21 SARSCOV2: CPT

## 2023-05-29 PROCEDURE — 82947 ASSAY GLUCOSE BLOOD QUANT: CPT

## 2023-05-29 PROCEDURE — 85730 THROMBOPLASTIN TIME PARTIAL: CPT

## 2023-05-29 PROCEDURE — 87040 BLOOD CULTURE FOR BACTERIA: CPT

## 2023-05-29 PROCEDURE — 85025 COMPLETE CBC W/AUTO DIFF WBC: CPT

## 2023-05-29 PROCEDURE — 82553 CREATINE MB FRACTION: CPT

## 2023-05-29 PROCEDURE — 84295 ASSAY OF SERUM SODIUM: CPT

## 2023-05-29 PROCEDURE — 80202 ASSAY OF VANCOMYCIN: CPT

## 2023-05-29 PROCEDURE — 82803 BLOOD GASES ANY COMBINATION: CPT

## 2023-05-29 PROCEDURE — 87077 CULTURE AEROBIC IDENTIFY: CPT

## 2023-05-29 PROCEDURE — 87641 MR-STAPH DNA AMP PROBE: CPT

## 2023-05-29 PROCEDURE — 82962 GLUCOSE BLOOD TEST: CPT

## 2023-05-29 PROCEDURE — 99261: CPT

## 2023-05-29 PROCEDURE — 82550 ASSAY OF CK (CPK): CPT

## 2023-05-29 PROCEDURE — 71045 X-RAY EXAM CHEST 1 VIEW: CPT

## 2023-05-29 PROCEDURE — 87181 SC STD AGAR DILUTION PER AGT: CPT

## 2023-05-29 RX ORDER — ERYTHROPOIETIN 10000 [IU]/ML
14000 INJECTION, SOLUTION INTRAVENOUS; SUBCUTANEOUS ONCE
Refills: 0 | Status: DISCONTINUED | OUTPATIENT
Start: 2023-05-29 | End: 2023-05-29

## 2023-05-29 RX ORDER — LEVOFLOXACIN 5 MG/ML
1 INJECTION, SOLUTION INTRAVENOUS
Qty: 4 | Refills: 0
Start: 2023-05-29 | End: 2023-06-04

## 2023-05-29 RX ORDER — ACETAMINOPHEN 500 MG
650 TABLET ORAL ONCE
Refills: 0 | Status: COMPLETED | OUTPATIENT
Start: 2023-05-29 | End: 2023-05-29

## 2023-05-29 RX ADMIN — HEPARIN SODIUM 5000 UNIT(S): 5000 INJECTION INTRAVENOUS; SUBCUTANEOUS at 05:49

## 2023-05-29 RX ADMIN — Medication 650 MILLIGRAM(S): at 14:04

## 2023-05-29 RX ADMIN — ERYTHROPOIETIN 10000 UNIT(S): 10000 INJECTION, SOLUTION INTRAVENOUS; SUBCUTANEOUS at 14:05

## 2023-05-29 RX ADMIN — VALSARTAN 40 MILLIGRAM(S): 80 TABLET ORAL at 05:48

## 2023-05-29 RX ADMIN — CARVEDILOL PHOSPHATE 12.5 MILLIGRAM(S): 80 CAPSULE, EXTENDED RELEASE ORAL at 17:41

## 2023-05-29 RX ADMIN — Medication 5 UNIT(S): at 08:29

## 2023-05-29 RX ADMIN — Medication 650 MILLIGRAM(S): at 14:30

## 2023-05-29 RX ADMIN — Medication 81 MILLIGRAM(S): at 12:42

## 2023-05-29 RX ADMIN — Medication 5 UNIT(S): at 17:42

## 2023-05-29 RX ADMIN — HEPARIN SODIUM 5000 UNIT(S): 5000 INJECTION INTRAVENOUS; SUBCUTANEOUS at 17:41

## 2023-05-29 RX ADMIN — CHLORHEXIDINE GLUCONATE 1 APPLICATION(S): 213 SOLUTION TOPICAL at 05:49

## 2023-05-29 RX ADMIN — CARVEDILOL PHOSPHATE 12.5 MILLIGRAM(S): 80 CAPSULE, EXTENDED RELEASE ORAL at 05:48

## 2023-05-29 RX ADMIN — CLOPIDOGREL BISULFATE 75 MILLIGRAM(S): 75 TABLET, FILM COATED ORAL at 12:42

## 2023-05-29 RX ADMIN — Medication 100 MILLIGRAM(S): at 12:42

## 2023-05-29 RX ADMIN — Medication 1: at 08:28

## 2023-05-29 NOTE — DISCHARGE NOTE NURSING/CASE MANAGEMENT/SOCIAL WORK - NSSCNAMETXT_GEN_ALL_CORE
[facility] will review if necessary for wound care until out-patient appointment. NP/PA to discuss further with Mrs. Owens

## 2023-05-29 NOTE — PROVIDER CONTACT NOTE (OTHER) - ASSESSMENT
Pt a+ox4, vss. Pt stable at this moment.
Pt a+ox4, all vss. Pt stable at this moment. Pt getting picked up for HD now and does not want to take lunch tray with him or get insulin coverage

## 2023-05-29 NOTE — DISCHARGE NOTE PROVIDER - NSDCFUSCHEDAPPT_GEN_ALL_CORE_FT
Ruma Araujo  [facility]  WOUNDCARE [address]  Scheduled Appointment: 06/05/2023    [facility]  VASCULAR [address]  Scheduled Appointment: 07/05/2023    Timmy Robles  [facility]  VASCULAR [address]  Scheduled Appointment: 07/05/2023    Marcia Jimenez  [facility]  OPHTHALM [address]  Scheduled Appointment: 07/05/2023    Sari Asher  [facility]  ENDOCRIN [address]  Scheduled Appointment: 08/22/2023

## 2023-05-29 NOTE — DISCHARGE NOTE PROVIDER - HOSPITAL COURSE
64yo M, hx of ESRD on HD MWF, CAD s/p stents, CHF, DM, presents with generalized weakness for 1 day        hx CAD   s/p 2 drug eluding stents 11/2022       CHF, DM, gout, ESRD on dialysis      c/c  weakness  of   limbs/  neuro dr mccain     has  functional  quadriplegia/  CT  head, . old  arachnoid  cyst/  no intervention       h/o   gout, right  wrist, on  prednisone/ colchicine      prior  MRI  head/  C  spine., old  infarcts     s/p falls , in the past ,  CT head  12/2022, R lateral convexity 1.3 cm SDH extending into interhemispheric fissure.      pt  with  h/o intermittent  right  arm /leg weakness  has been   c/c  for  yrs/  with  unclear  etiology     mri head/  c spine in 10/22,  no cord  compression     seen by neuro in  past,   per  wife,  thinks  pt  has  a psychological  component  regarding  his  weakness          *  now admitted  with fevers/  sepsis   on arrival, resolved,   from bacteremia    Strep pneumoniae  bacteremia  and pna    was on iv rocephin   cxr.,  pna.  ct head, normal  Gout    CAD,    s/p    pci. on  asa/ plavix/   lipitor,     h/o cardiomyopathy.  ef  40      CKD,    renal  dr dickerson      c/c  anemia     DM,  follow  fs           on lantus/  known to  nika mckeon  chest,  chf/  on  HD    echo,  ef 50, no vegetation    per  ID   levaquin,  every othe r day,   for total  of  2  weeks                                         62yo M, hx of ESRD on HD MWF, CAD s/p stents, CHF, DM, presents with generalized weakness for 1 day        hx CAD   s/p 2 drug eluding stents 11/2022       CHF, DM, gout, ESRD on dialysis      c/c  weakness  of   limbs/  neuro dr mccain     has  functional  quadriplegia/  CT  head, . old  arachnoid  cyst/  no intervention       h/o   gout, right  wrist, on  prednisone/ colchicine      prior  MRI  head/  C  spine., old  infarcts     s/p falls , in the past ,  CT head  12/2022, R lateral convexity 1.3 cm SDH extending into interhemispheric fissure.      pt  with  h/o intermittent  right  arm /leg weakness  has been   c/c  for  yrs/  with  unclear  etiology     mri head/  c spine in 10/22,  no cord  compression     seen by neuro in  past,   per  wife,  thinks  pt  has  a psychological  component  regarding  his  weakness          *  now admitted  with fevers/  sepsis   on arrival, resolved,   from bacteremia    Strep pneumoniae  bacteremia  and pna    was on iv rocephin   cxr.,  pna.  ct head, normal  Gout    CAD,    s/p    pci. on  asa/ plavix/   lipitor,     h/o cardiomyopathy.  ef  40      CKD,    renal  dr dickerson      c/c  anemia     DM,  follow  fs           on lantus/  known to  nika mckeon  chest,  chf/  on  HD    echo,  ef 50, no vegetation    per  ID   levaquin,  every othe r day,   for total  of  2  weeks                                          62yo M, hx of ESRD on HD MWF, CAD s/p stents, CHF, DM, presents with generalized weakness for 1 day        hx CAD   s/p 2 drug eluding stents 11/2022       CHF, DM, gout, ESRD on dialysis      c/c  weakness  of   limbs/  neuro dr mccain     has  functional  quadriplegia/  CT  head, . old  arachnoid  cyst/  no intervention       h/o   gout, right  wrist, on  prednisone/ colchicine      prior  MRI  head/  C  spine., old  infarcts     s/p falls , in the past ,  CT head  12/2022, R lateral convexity 1.3 cm SDH extending into interhemispheric fissure.      pt  with  h/o intermittent  right  arm /leg weakness  has been   c/c  for  yrs/  with  unclear  etiology     mri head/  c spine in 10/22,  no cord  compression     seen by neuro in  past,   per  wife,  thinks  pt  has  a psychological  component  regarding  his  weakness          *  now admitted  with fevers/  sepsis   on arrival, resolved,   from bacteremia    Strep pneumoniae  bacteremia  and pna    was on iv rocephin   cxr.,  pna.  ct head, normal  Gout    CAD,    s/p    pci. on  asa/ plavix/   lipitor,     h/o cardiomyopathy.  ef  40      CKD,    renal  dr dickerson      c/c  anemia     DM,  follow  fs           on lantus/  known to  nika mckeon  chest,  chf/  on  HD    echo,  ef 50, no vegetation    per  ID   levaquin,  every othe r day,   for total  of  2  weeks   Follow up with wound care for left heel wound  Discharge home         62yo M, hx of ESRD on HD MWF, CAD s/p stents, CHF, DM, presents with generalized weakness for 1 day        hx CAD   s/p 2 drug eluding stents 11/2022       CHF, DM, gout, ESRD on dialysis      c/c  weakness  of   limbs/  neuro dr mccain     has  functional  quadriplegia/  CT  head, . old  arachnoid  cyst/  no intervention       h/o   gout, right  wrist, on  prednisone/ colchicine      prior  MRI  head/  C  spine., old  infarcts     s/p falls , in the past ,  CT head  12/2022, R lateral convexity 1.3 cm SDH extending into interhemispheric fissure.      pt  with  h/o intermittent  right  arm /leg weakness  has been   c/c  for  yrs/  with  unclear  etiology     mri head/  c spine in 10/22,  no cord  compression     seen by neuro in  past,   per  wife,  thinks  pt  has  a psychological  component  regarding  his  weakness          *  now admitted  with fevers/  sepsis   on arrival, resolved,   from bacteremia    Strep pneumoniae  bacteremia  and pna    was on iv rocephin   cxr.,  pna.  ct head, normal  Gout    CAD,    s/p    pci. on  asa/ plavix/   lipitor,     h/o cardiomyopathy.  ef  40      CKD,    renal  dr dickerson      c/c  anemia     DM,  follow  fs           on lantus/  known to  nika mckeon  chest,  chf/  on  HD    echo,  ef 50, no vegetation    per  ID   levaquin,  every othe r day,   for total  of  2  weeks   Follow up with wound care for left heel wound- appointment 6/5/23  Discharge home

## 2023-05-29 NOTE — DISCHARGE NOTE NURSING/CASE MANAGEMENT/SOCIAL WORK - PATIENT PORTAL LINK FT
You can access the FollowMyHealth Patient Portal offered by [facility] by registering at the following website: [email or web address]. By joining [facility]’s FollowMyHealth portal, you will also be able to view your health information using other applications (apps) compatible with our system.

## 2023-05-29 NOTE — PROGRESS NOTE ADULT - ASSESSMENT
63    yr      hx CAD   s/p 2 drug eluding stents 11/2022       CHF, DM, gout, ESRD on dialysis      c/c  weakness  of   limbs/  neuro dr mccain     has  functional  quadriplegia/  CT  head, . old  arachnoid  cyst/  no intervention       h/o   gout, right  wrist, on  prednisone/ colchicine      prior  MRI  head/  C  spine., old  infarcts     s/p falls , in the past ,  CT head  12/2022, R lateral convexity 1.3 cm SDH extending into interhemispheric fissure.      pt  with  h/o intermittent  right  arm /leg weakness  has been   c/c  for  yrs/  with  unclear  etiology     mri head/  c spine in 10/22,  no cord  compression     seen by neuro in  past,   per  wife,  thinks  pt  has  a psychological  component  regarding  his  weakness          *  now admitted  with fevers/    sepsis   on arrival     Strep pneumoniae  bacteremia  and pna     on iv rocephin/   cxr.,  pna.  ct head, normal  Gout    CAD,    s/p    pci. on  asa/ plavix/   lipitor,     h/o cardiomyopathy.  ef  40      CKD,    renal  dr dickerson      c/c  anemia     DM,  follow  fs           on lantus/  known to  nika talley    follow  rpt  bcx    cy  chest,  chf/  on  HD    echo,  ef 50, no vegetation   on iv rocephin/  bacteremia.  await   duration form ID                                  
    63    yr      hx CAD   s/p 2 drug eluding stents 11/2022       CHF, DM, gout, ESRD on dialysis      c/c  weakness  of   limbs/  neuro dr mccain     has  functional  quadriplegia/  CT  head, . old  arachnoid  cyst/  no intervention       h/o   gout, right  wrist, on  prednisone/ colchicine      prior  MRI  head/  C  spine., old  infarcts     s/p falls , in the past ,  CT head  12/2022, R lateral convexity 1.3 cm SDH extending into interhemispheric fissure.      pt  with  h/o intermittent  right  arm /leg weakness  has been   c/c  for  yrs/  with  unclear  etiology     mri head/  c spine in 10/22,  no cord  compression     seen by neuro in  past,   per  wife,  thinks  pt  has  a psychological  component  regarding  his  weakness          *  now admitted  with fevers/    sepsis   on arrival     Strep pneumoniae  bacteremia  and pna     on iv rocephin/  doxy      s/p  vanco   defer  ab to    ID  d r brieff   cxr.,  pna.  ct head, normal      CAD,    s/p    pci. on  asa/ plavix/   lipitor,     h/o cardiomyopathy.  ef  40      CKD,          renal  dr dickerson      c/c  anemia     DM,  follow  fs           on lantus/  known to  nika wallis                             
    63    yr      hx CAD   s/p 2 drug eluding stents 11/2022       CHF, DM, gout, ESRD on dialysis      c/c  weakness  of   limbs/  neuro dr mccain     has  functional  quadriplegia/  CT  head, . old  arachnoid  cyst/  no intervention       h/o   gout, right  wrist, on  prednisone/ colchicine      prior  MRI  head/  C  spine., old  infarcts     s/p falls , in the past ,  CT head  12/2022, R lateral convexity 1.3 cm SDH extending into interhemispheric fissure.      pt  with  h/o intermittent  right  arm /leg weakness  has been   c/c  for  yrs/  with  unclear  etiology     mri head/  c spine in 10/22,  no cord  compression     seen by neuro in  past,   per  wife,  thinks  pt  has  a psychological  component  regarding  his  weakness          *  now admitted  with fevers/    sepsis   on arrival     Strep pneumoniae  bacteremia  and pna     on iv rocephin/  doxy      s/p  vanco   defer  ab to    ID  d r brieff   cxr.,  pna.  ct head, normal  Gout    CAD,    s/p    pci. on  asa/ plavix/   lipitor,     h/o cardiomyopathy.  ef  40      CKD,          renal  dr dickerson      c/c  anemia     DM,  follow  fs           on lantus/  known to  nika talley    follow  rpt  bcx    cy  chest,  chf/  on  HD    echo,  ef 50, no vegetation                                  
    63    yr      hx CAD   s/p 2 drug eluding stents 11/2022       CHF, DM, gout, ESRD on dialysis      c/c  weakness  of   limbs/  neuro dr mccain     has  functional  quadriplegia/  CT  head, . old  arachnoid  cyst/  no intervention       h/o   gout, right  wrist, on  prednisone/ colchicine      prior  MRI  head/  C  spine., old  infarcts     s/p falls , in the past ,  CT head  12/2022, R lateral convexity 1.3 cm SDH extending into interhemispheric fissure.      pt  with  h/o intermittent  right  arm /leg weakness  has been   c/c  for  yrs/  with  unclear  etiology     mri head/  c spine in 10/22,  no cord  compression     seen by neuro in  past,   per  wife,  thinks  pt  has  a psychological  component  regarding  his  weakness          *  now admitted  with fevers/  sepsis   on arrival, resolved,   from bacteremia    Strep pneumoniae  bacteremia  and pna    was on iv rocephin   cxr.,  pna.  ct head, normal  Gout    CAD,    s/p    pci. on  asa/ plavix/   lipitor,     h/o cardiomyopathy.  ef  40      CKD,    renal  dr dickerson      c/c  anemia     DM,  follow  fs           on lantus/  known to  nika talley    cy  chest,  chf/  on  HD    echo,  ef 50, no vegetation    per  ID   levaquin,  every other  day,   for total  of  2  weeks   on HD    start   d/c  planning                                    
  64yo M, hx of ESRD on HD MWF, CAD s/p stents, CHF, DM, presents with generalized weakness for 1 day
63y male with a past history of ESRD on HD via left arm AVF, CAD with stents, CHF, T2DM , chronic left heel wound who was admitted 5/23 with 1 day of fever and chills.   He denies any GI,  or respiratory symptoms. No one ill at home, no access issues, he received cephalexin for left heal wound in March via Podiatry.   He follows with podiatry on outside.  CT chest with pulmonary edema, but his blood cult with Strep pneumonia bacteremia, he cannot tell me for sure about his pneumococcal vaccination status.  He appears clinically stable, awaiting formal sensitivities of pneumococcus.  His TTE is without vegetations. His repeat blood cultures are negative. His isolate has relative PCN resistance. IV CTX okay, I would avoid oral beta lactams. PO levaquin is an option  Suggest:  1. CTX IV while in hospital  2.Can consider discharge planning from ID viewpoint. I would use levaquin 500 po every other day to complete 2 weeks total. Today is day 5/14.I think he is a candidate from ID standpoint for discharge planning.
    63    yr      hx CAD   s/p 2 drug eluding stents 11/2022       CHF, DM, gout, ESRD on dialysis      c/c  weakness  of   limbs/  neuro dr mccain     has  functional  quadriplegia/  CT  head, . old  arachnoid  cyst/  no intervention       h/o   gout, right  wrist, on  prednisone/ colchicine      prior  MRI  head/  C  spine., old  infarcts     s/p falls , in the past ,  CT head  12/2022, R lateral convexity 1.3 cm SDH extending into interhemispheric fissure.      pt  with  h/o intermittent  right  arm /leg weakness  has been   c/c  for  yrs/  with  unclear  etiology     mri head/  c spine in 10/22,  no cord  compression     seen by neuro in  past,   per  wife,  thinks  pt  has  a psychological  component  regarding  his  weakness          *  now admitted  with fevers/  sepsis   on arrival, resolved,   from bacteremia    Strep pneumoniae  bacteremia  and pna    was on iv rocephin   cxr.,  pna.  ct head, normal  Gout    CAD,    s/p    pci. on  asa/ plavix/   lipitor,     h/o cardiomyopathy.  ef  40      CKD,    renal  dr dickerson      c/c  anemia     DM,  follow  fs           on lantus/  known to  endo d r gerri    cy  chest,  chf/  on  HD    echo,  ef 50, no vegetation    per  ID   levaquin,  every othe r day,   for total  of  2  weeks   may start   d/c  planning                                  
63y male with a past history of ESRD on HD via left arm AVF, CAD with stents, CHF, T2DM , chronic left heel wound who was admitted 5/23 with 1 day of fever and chills.   He denies any GI,  or respiratory symptoms. No one ill at home, no access issues, he received cephalexin for left heal wound in March via Podiatry.   He follows with podiatry on outside.  CT chest with pulmonary edema, but his blood cult with Strep pneumonia bacteremia, he cannot tell me for sure about his pneumococcal vaccination status.  He appears clinically stable, awaiting formal sensitivities of pneumococcus.  His TTE is without vegetations  Suggest:  1. continue CTX, day 4 of treatment for bacteremic pneumococcal pneumonia  2. Sensitivities are pending as are repeat blood cultures.  3.I would like to complete at least 5 days of IV therapy before considering oral options.  4. from an ID perspective pneumococcus is not a common cause of endocarditis and unless repeat blood cultures are positive I do not see a need for a RACHEL at this point.  5. He may be a candidate for oral conversion of antibiotics over next 1-2 days if sensitivities will allow.
63y male with a past history of ESRD on HD via left arm AVF, CAD with stents, CHF, T2DM , chronic left heel wound who was admitted 5/23 with 1 day of fever and chills.   He denies any GI,  or respiratory symptoms. No one ill at home, no access issues, he received cephalexin for left heal wound in March via Podiatry.   He follows with podiatry on outside.  CT chest with pulmonary edema, but his blood cult with Strep pneumonia bacteremia, he cannot tell me for sure about his pneumococcal vaccination status.  He appears clinically stable, awaiting formal sensitivities of pneumococcus.  His TTE is without vegetations. His repeat blood cultures are negative. His isolate has relative PCN resistance. IV CTX okay, I would avoid oral beta lactams. PO levaquin is an option  Suggest:  1. CTX IV while in hospital  2.Can consider discharge planning from ID viewpoint. I would use levaquin 500 po every other day to complete 2 weeks total. Today is day 6/14.I think he is a candidate from ID standpoint for discharge planning.  3. He appears to be clinically responding to CTX
64yo M, hx of ESRD on HD MWF, CAD s/p stents, CHF, DM, presents with generalized weakness for 1 day  PNA    1 Renal - Next HD today    2 ID-IV abx and possible CT of chest to define the infiltrate  Cont supplemental oxygen   Febrile again   3 CVS-Not in heart failure     Sayed [facility]   [phone]   
on room air  afebrile  attests to intermittent cough without sputum    last BMP and CBC 5/24. refuses blood draws.    Due for HD Monday 5/29/23    acetaminophen   IVPB .. 1000 milliGRAM(s) IV Intermittent once  allopurinol 100 milliGRAM(s) Oral daily  aspirin  chewable 81 milliGRAM(s) Oral daily  atorvastatin 80 milliGRAM(s) Oral at bedtime  carvedilol 12.5 milliGRAM(s) Oral every 12 hours  cefTRIAXone   IVPB 2000 milliGRAM(s) IV Intermittent every 24 hours  chlorhexidine 2% Cloths 1 Application(s) Topical <User Schedule>  clopidogrel Tablet 75 milliGRAM(s) Oral daily  epoetin wayne-epbx (RETACRIT) Injectable 29474 Unit(s) IV Push once  guaiFENesin Oral Liquid (Sugar-Free) 100 milliGRAM(s) Oral every 6 hours PRN  heparin   Injectable 5000 Unit(s) SubCutaneous every 12 hours  insulin glargine Injectable (LANTUS) 10 Unit(s) SubCutaneous at bedtime  insulin lispro (ADMELOG) corrective regimen sliding scale   SubCutaneous three times a day before meals  insulin lispro Injectable (ADMELOG) 3 Unit(s) SubCutaneous three times a day before meals  senna 2 Tablet(s) Oral at bedtime  tamsulosin 0.4 milliGRAM(s) Oral at bedtime  valsartan 40 milliGRAM(s) Oral daily      VITAL:  T(C): , Max: 36.8 (05-27-23 @ 21:25)  T(F): , Max: 98.2 (05-27-23 @ 21:25)  HR: 71 (05-28-23 @ 04:48)  BP: 149/74 (05-28-23 @ 04:48)  BP(mean): --  RR: 18 (05-28-23 @ 04:48)  SpO2: 96% (05-28-23 @ 04:48)  Wt(kg): --      PHYSICAL EXAM:  Constitutional: NAD  Neck:  No JVD  Respiratory: diminished  BS  Cardiovascular: S1 and S2  Gastrointestinal: BS+, soft, NT/ND  Extremities: No peripheral edema  Neurological:  no focal deficits  Psychiatric: Normal mood, normal affect  : No Jay  Skin: No rashes  Access: AVF(+thrill)    LABS:                ASSESSMENT/PLAN  62yo M, hx of ESRD on HD MWF, CAD s/p stents, CHF, DM, presents with generalized weakness for 1 day  PNA    1 Renal - Due for HD Monday 5/29; refuses blood draws for BMP and CBC. counselled on the importance of  having  lab work to gauge treatment parameters states understanding, but still refuses lab draws.  2 ID-IV Rocephin  Cont supplemental oxygen   3 CVS- BP acceptable at this time      Valentino Meza NP-BC  [employer]  [phone]  
    63    yr      hx CAD   s/p 2 drug eluding stents 11/2022       CHF, DM, gout, ESRD on dialysis      c/c  weakness  of   limbs/  neuro dr mccain     has  functional  quadriplegia/  CT  head, . old  arachnoid  cyst/  no intervention       h/o   gout, right  wrist, on  prednisone/ colchicine      prior  MRI  head/  C  spine., old  infarcts     s/p falls , in the past ,  CT head  12/2022, R lateral convexity 1.3 cm SDH extending into interhemispheric fissure.      pt  with  h/o intermittent  right  arm /leg weakness  has been   c/c  for  yrs/  with  unclear  etiology     mri head/  c spine in 10/22,  no cord  compression     seen by neuro in  past,   per  wife,  thinks  pt  has  a psychological  component  regarding  his  weakness          *  now admitted  with fevers/    sepsis   on arrival     Strep pneumoniae  bacteremia  and pna     on iv rocephin/   cxr.,  pna.  ct head, normal  Gout    CAD,    s/p    pci. on  asa/ plavix/   lipitor,     h/o cardiomyopathy.  ef  40      CKD,    renal  dr dickerson      c/c  anemia     DM,  follow  fs           on lantus/  known to  nika talley    cy  chest,  chf/  on  HD    echo,  ef 50, no vegetation   on iv rocephin/  bacteremia.  pe r iD,  option is  levaquin  for total  of  2  weeks                                  
63y male with a past history of ESRD on HD via left arm AVF, CAD with stents, CHF, T2DM , chronic left heel wound who was admitted 5/23 with 1 day of fever and chills.   He denies any GI,  or respiratory symptoms. No one ill at home, no access issues, he received cephalexin for left heal wound in March via Podiatry.   He follows with podiatry on outside.  CT chest with pulmonary edema, but his blood cult with Strep pneumonia bacteremia, he cannot tell me for sure about his pneumococcal vaccination status.  He appears clinically stable, awaiting formal sensitivities of pneumococcus.  His TTE is without vegetations. His repeat blood cultures are negative. His isolate has relative PCN resistance. IV CTX okay, I would avoid oral beta lactams. PO levaquin is an option  He completed 6 days of CTX yesterday, now on levaquin  Suggest:  1. Levaquin 500 po q48 x 4 doses  2.Can consider discharge planning from ID viewpoint. I would use levaquin 500 po every other day to complete 2 weeks total. Today is day 7/14.I think he is a candidate from ID standpoint for discharge planning.  3. Non compliance with labs noted, hopefully will be compliant with antibiotics after discharge
64yo M, hx of ESRD on HD MWF, CAD s/p stents, CHF, DM, presents with generalized weakness for 1 day  PNA    1 Renal - Next HD today with extra fluid removal   2 ID-IV abx   Cont supplemental oxygen   3 CVS-There was congestion on the CT of the chest.  Will push for fluid removal     Sayed [facility]   [phone]   
  62yo M, hx of ESRD on HD MWF, CAD s/p stents, CHF, DM, presents with generalized weakness for 1 day  PNA    1 Renal - Due for HD Monday 5/29;     2 ID- PO abx   3 CVS- BP acceptable at this time    DC planning    PCP    Sayed [employer] [facility]   [phone]     
63y male with a past history of ESRD on HD via left arm AVF, CAD with stents, CHF, T2DM , chronic left heel wound who was admitted 5/23 with 1 day of fever and chills.   He denies any GI,  or respiratory symptoms. No one ill at home, no access issues, he received cephalexin for left heal wound in March via Podiatry.   He follows with podiatry on outside.  CT chest with pulmonary edema, but his blood cult with Strep pneumonia bacteremia, he cannot tell me for sure about his pneumococcal vaccination status.  He appears clinically stable, awaiting formal sensitivities of pneumococcus.  His TTE is without vegetations  Suggest:  1. continue CTX, day 3 of treatment for bacteremic pneumococcal pneumonia  2. Sensitivities are pending as are repeat blood cultures.  3.I would like to complete at least 5 days of IV therapy before considering oral options.  4. from an ID perspective pneumococcus is not a common cause of endocarditis and unless repeat blood cultures are positive I do not see a need for a RACHEL at this point.
63y male with a past history of ESRD on HD via left arm AVF, CAD with stents, CHF, T2DM , chronic left heel wound who was admitted 5/23 with 1 day of fever and chills.   He denies any GI,  or respiratory symptoms. No one ill at home, no access issues, he received cephalexin for left heal wound in March via Podiatry.   He follows with podiatry on outside.  CT chest with pulmonary edema, but his blood cult with Strep pneumonia bacteremia, he cannot tell me for sure about his pneumococcal vaccination status.  Suggest:  1. Repeat blood cult, pneumococcus is unusual pathogen for endovascular infection, may consider repeat echo/RACHEL  2.CT chest  demonstrates perihilar infiltrates and adjacent pleural effusion and in a setting of high grade pneumococcal bacteremia, puts him at risk of parapneumonic effusion    3. Will increase CTX to 2g daily  4. Podiatry f/u for L heel wound, but unlikely source of pneumococcemia
64yo M, hx of ESRD on HD MWF, CAD s/p stents, CHF, DM, presents with generalized weakness for 1 day  PNA    1 Renal - Next HD in am   2 ID-IV abx and possible CT of chest to define the infiltrate  Cont supplemental oxygen   3 CVS-There was congestion on the CT of the chest.  Will push for fluid removal in am      Sayed [facility]   [phone]   
63-year-old man with history of poorly controlled type 2 diabetes, complicated by end-stage renal disease on hemodialysis Monday Wednesday Friday, CAD status post stents, CHF, presenting with pneumonia.    1.  Type 2 diabetes  A1c likely inaccurate in the setting of CKD.  At the most clinic visit, downloaded his patience sensor which is more consistent with an A1c greater than 10%.  Dietary indiscretion was his most deterrent of good glycemic control.  While inpatient, patient is on a much lower dose of insulin and his glucose is well controlled.  Recommend Lantus 10 units at bedtime  Recommend Admelog 3 units 3 times daily with meals  Recommend low-dose sliding scale before every meal and at bedtime.    Patient with poor appetite   Consider psychiatry consult and social work consult as patient appears to be very depressed secondary to his health and that has been impacting his diabetes care and general health.    Discharge:  Trulicity  Basal bolus, does to be determined.     2.  Hypertension  BP goal <130/80  Management as per nephrology.  Patient is currently on valsartan 40 mg once daily and carvedilol 12.5 mg every 12 hours.    3.  Hyperlipidemia  LDL goal <55 mg/dL  Continue with high intensity statin, currently taking atorvastatin 80 mg once daily at bedtime.  Check fasting lipid profile once annually.
64y/o M w/h/o uncontrolled T2DM per last CGM A1C download A1C over 10%> Skewed A1C in setting of anemia of chronic disease. DM c/b ESRD on HD,  CAD status post stents. Also h/o CHF/HTN/HLD/ GERD/gout/ ICM/BPH. Here with weakness found to have  pneumonia/ bacteremia on antibiotic. Endocrine consulted for DM management. Tolerating POs with BG at goal requiring lower insulin doses when compared to home regimen. Will c/w present insulin regimen to keep BG goal 100 to 180s. No hypoglycemia.  Spoke to pt and wife about lower insulin requirements while in hospital. Pt reports eating every thing while in hospital. Wife, states pt doesn't follow healthy eating or portion control at home. Pt not ready to change diet at this time. Instructed pt and wife about the need to keep BG <200s at home to prevent further DM complications. Since pt has CGM he can check BG 2h post prandial to assess if pt taking enough insulin for waht he is eating and then adjust insulin according to PO intake. Wife is a Dialysis technitian and states she can help pt with this. Pt follows with Dr Hernandez     Home regimen: Toujeo 20 q hs plus Humalog 8-10 units ac meals

## 2023-05-29 NOTE — DISCHARGE NOTE PROVIDER - CARE PROVIDER_API CALL
Rosaline Israel  [facility] Medicine  [address]  [place] [address]  Phone: [phone]  Fax: [phone]  Follow Up Time:     Marlon Alvarado  Infectious Disease  [address]  [place] [address]  Phone: [phone]  Fax: [phone]  Follow Up Time:    Rosaline Israel  Family Medicine  [address]  [place] [address]  Phone: [phone]  Fax: [phone]  Follow Up Time:     Marlon Alvarado  Infectious Disease  [address]  [place] [address]  Phone: [phone]  Fax: [phone]  Follow Up Time:     Chetan Green  Podiatric Medicine and Surgery  [address], [place] [address]  Phone: [phone]  Fax: [phone]  Follow Up Time:

## 2023-05-29 NOTE — DISCHARGE NOTE PROVIDER - NSDCFUADDAPPT_GEN_ALL_CORE_FT
APPTS ARE READY TO BE MADE: [x ] YES    Best Family or Patient Contact (if needed):    Additional Information about above appointments (if needed):    1: Brief, ID   2:   3:     Other comments or requests:    APPTS ARE READY TO BE MADE: [x ] YES    Best Family or Patient Contact (if needed):    Additional Information about above appointments (if needed):    1: Brief, ID   2: Wound care   3: POdiatry     Other comments or requests:    APPTS ARE READY TO BE MADE: [x ] YES    Best Family or Patient Contact (if needed):    Additional Information about above appointments (if needed):    1: Brief, ID   2: Wound care ** can patient get appointment this week   3: POdiatry     Other comments or requests:    APPTS ARE READY TO BE MADE: [x ] YES    Best Family or Patient Contact (if needed):    Additional Information about above appointments (if needed):    1: Brief, ID   2: Wound care ** can patient get appointment this week   3: POdiatry     Other comments or requests:   Patient was provided with follow up request details and was advised to call to schedule follow up within specified time frame

## 2023-05-29 NOTE — DISCHARGE NOTE NURSING/CASE MANAGEMENT/SOCIAL WORK - NSDCVIVACCINE_GEN_ALL_CORE_FT
COVID-19, mRNA, LNP-S, PF, 30 mcg/0.3 mL dose (Pfizer); 09-Dec-2021 11:24; Sobeida Kang (RN); [employer]; [id number] (Exp. Date: 31-Dec-2021); IntraMuscular; Deltoid Left.; 0.3 milliLiter(s);   influenza, injectable, quadrivalent, preservative free; 09-Dec-2021 11:25; Sobeida Kang (SELVIN); [employer]; [id number] (Exp. Date: 30-Jun-2022); IntraMuscular; Deltoid Right.; 0.5 milliLiter(s); VIS (VIS Published: 06-Aug-2021, VIS Presented: 09-Dec-2021);

## 2023-05-29 NOTE — PROGRESS NOTE ADULT - SUBJECTIVE AND OBJECTIVE BOX
NEPHROLOGY-[facility] [phone]        Patient seen and examined in bed.  He was feeling well         MEDICATIONS  (STANDING):  acetaminophen   IVPB .. 1000 milliGRAM(s) IV Intermittent once  allopurinol 100 milliGRAM(s) Oral daily  aspirin  chewable 81 milliGRAM(s) Oral daily  atorvastatin 80 milliGRAM(s) Oral at bedtime  carvedilol 12.5 milliGRAM(s) Oral every 12 hours  chlorhexidine 2% Cloths 1 Application(s) Topical <User Schedule>  clopidogrel Tablet 75 milliGRAM(s) Oral daily  epoetin wayne-epbx (RETACRIT) Injectable 64672 Unit(s) IV Push once  epoetin wayne-epbx (RETACRIT) Injectable 87569 Unit(s) IV Push once  heparin   Injectable 5000 Unit(s) SubCutaneous every 12 hours  insulin glargine Injectable (LANTUS) 10 Unit(s) SubCutaneous at bedtime  insulin lispro (ADMELOG) corrective regimen sliding scale   SubCutaneous three times a day before meals  insulin lispro Injectable (ADMELOG) 5 Unit(s) SubCutaneous three times a day before meals  levoFLOXacin  Tablet 500 milliGRAM(s) Oral every 48 hours  senna 2 Tablet(s) Oral at bedtime  tamsulosin 0.4 milliGRAM(s) Oral at bedtime  valsartan 40 milliGRAM(s) Oral daily      VITAL:  T(C): , Max: 37 (05-29-23 @ 04:52)  T(F): , Max: 98.6 (05-29-23 @ 04:52)  HR: 76 (05-29-23 @ 04:52)  BP: 149/79 (05-29-23 @ 04:52)  BP(mean): --  RR: 18 (05-29-23 @ 04:52)  SpO2: 96% (05-29-23 @ 04:52)  Wt(kg): --    I and O's:    05-28 @ 07:01  -  05-29 @ 07:00  --------------------------------------------------------  IN: 120 mL / OUT: 250 mL / NET: -130 mL    05-29 @ 07:01 - 05-29 @ 11:08  --------------------------------------------------------  IN: 0 mL / OUT: 380 mL / NET: -380 mL          PHYSICAL EXAM:    Constitutional: NAD  Neck:  No JVD  Respiratory: CTAB/L  Cardiovascular: S1 and S2  Gastrointestinal: BS+, soft, NT/ND  Extremities: No peripheral edema  Neurological: A/O x 3, no focal deficits  Psychiatric: Normal mood, normal affect  : No Jay  Skin: No rashes  Access:avf    LABS:                Urine Studies:          RADIOLOGY & ADDITIONAL STUDIES:

## 2023-05-29 NOTE — DISCHARGE NOTE NURSING/CASE MANAGEMENT/SOCIAL WORK - NSDCPEFALRISK_GEN_ALL_CORE
For information on Fall & Injury Prevention, visit: [email or web address] OR  [email or web address] OR  https://www.cdc.gov/steadi/patient.html

## 2023-05-29 NOTE — PROGRESS NOTE ADULT - PROVIDER SPECIALTY LIST ADULT
Endocrinology
Infectious Disease
Internal Medicine
Nephrology
Cardiology
Infectious Disease
Internal Medicine
Nephrology
Nephrology
Cardiology
Infectious Disease
Infectious Disease
Internal Medicine
Nephrology
Nephrology
Infectious Disease
Infectious Disease
Internal Medicine
Nephrology
Endocrinology

## 2023-05-29 NOTE — CHART NOTE - NSCHARTNOTEFT_GEN_A_CORE
Age: 63y    Gender: Male    POCT Blood Glucose:  191 mg/dL (05-28-23 @ 08:32)  204 mg/dL (05-27-23 @ 21:50)  262 mg/dL (05-27-23 @ 16:59)  219 mg/dL (05-27-23 @ 11:56)      eMAR:allopurinol   100 milliGRAM(s) Oral (05-27-23 @ 12:01)    atorvastatin   80 milliGRAM(s) Oral (05-27-23 @ 22:14)    insulin glargine Injectable (LANTUS)   10 Unit(s) SubCutaneous (05-27-23 @ 22:11)    insulin lispro (ADMELOG) corrective regimen sliding scale   1 Unit(s) SubCutaneous (05-28-23 @ 08:33)   3 Unit(s) SubCutaneous (05-27-23 @ 17:28)   2 Unit(s) SubCutaneous (05-27-23 @ 12:02)    insulin lispro Injectable (ADMELOG)   3 Unit(s) SubCutaneous (05-28-23 @ 08:32)   3 Unit(s) SubCutaneous (05-27-23 @ 17:28)   3 Unit(s) SubCutaneous (05-27-23 @ 12:03)     POC glucose, insulin requirements, lab values reviewed.   Type 2 diabetes mellitus with hyperglycemia.   ·  Plan: Test BG ac and hs  -C/w Lantus 10 units at bedtime, monitor FBG , if remains above goal tomorrow consider increasing lantus   -Prandial BG above goal increase to  Admelog 5 units with meals. Hold if not eating  -C/w low-dose sliding scale before every meal and at bedtime.
House Endocrinology called. Outpatient Follows up with Dr Hernandez.
ID recommending transition to Oral Levaquin from IV Ceftriaxone.  Dr Alvarado (ID) contacted and dosing confirmed as PO Levaquin 500mg every 48 hours for 4 MORE DOSES.  IV ceftriaxone final IVSS dose today at 12 noon - Order placed to start Levaquin tomorrow 5/29/23 - RN contacted and updated
Medically cleared for discharge home. Levaquin every other day for a total of 4 doses ( received dose today). HD in the community reinstated. Pt will follow up with wound care as scheduled for Left heel wound.
Medicine ACP note    Notified by RN that patient's Bcx results on  05/24/23 with   Culture - Blood (05.23.23 @ 09:45)    Gram Stain:   Growth in aerobic bottle: Gram Positive Cocci in Pairs and Chains  Growth in anaerobic bottle: Gram Positive Cocci in Pairs and Chains    Specimen Source: .Blood Blood-Peripheral    Culture Results:   Growth in anaerobic bottle: Gram Positive Cocci in Pairs and Chains  Growth in aerobic bottle: Gram Positive Cocci in Pairs and Chains    62yo M, hx of ESRD on HD MWF, CAD s/p stents, CHF, DM, presents with generalized weakness for 1 day  PNA- s/p vancomycin on 5/23 - on cef and doxy   Esrd- cw HD  Elevated trops- no chest pain . 130- 131      Gram positive Bactremia  Patient HDS stable, afebrile  S/P Vnaco on 5/23, Vanco by level as patient ESRD and ID consult am   F/U Bcx final result  Repeat Bcx x2 sets ordered  Will continue to monitor  Will sign out to covering Provider    Shaun Walsh P BC  [id number]

## 2023-05-29 NOTE — PROVIDER CONTACT NOTE (OTHER) - ACTION/TREATMENT ORDERED:
Provider notified, pt educated. No recommendation made at this moment. Will continue to monitor
Provider notified, pt educated. No recommendation made at this moment.

## 2023-05-29 NOTE — PROVIDER CONTACT NOTE (OTHER) - BACKGROUND
62 yo m hx of esrd on hd mwf, cad s/p stents, chf, dm
62 yo m hx of esrd on hd mwf, cad s/p stents, chf, dm. Admitted for AMS, bacteriemia. FS for lunch is 211

## 2023-05-29 NOTE — DISCHARGE NOTE PROVIDER - PROVIDER TOKENS
PROVIDER:[TOKEN:[[id number]]],PROVIDER:[TOKEN:[[id number]]] PROVIDER:[TOKEN:[[id number]]],PROVIDER:[TOKEN:[[id number]]],PROVIDER:[TOKEN:[[id number]]]

## 2023-05-29 NOTE — PROVIDER CONTACT NOTE (OTHER) - SITUATION
Pt refusing insulin coverage for lunch since he is getting picked up now for HD
Pt refusing blood work that was ordered. Pt educated on the importance of doing his blood work but patient still refused.

## 2023-05-29 NOTE — PROGRESS NOTE ADULT - SUBJECTIVE AND OBJECTIVE BOX
date of service: 05-29-23 @ 09:11  afberile  REVIEW OF SYSTEMS:  CONSTITUTIONAL: No fever,  no  weight loss  ENT:  No  tinnitus,   no   vertigo  NECK: No pain or stiffness  RESPIRATORY: No cough, wheezing, chills or hemoptysis;    No Shortness of Breath  CARDIOVASCULAR: No chest pain, palpitations, dizziness  GASTROINTESTINAL: No abdominal or epigastric pain. No nausea, vomiting, or hematemesis; No diarrhea  No melena or hematochezia.  GENITOURINARY: No dysuria, frequency, hematuria, or incontinence  NEUROLOGICAL: No headaches  SKIN: No itching,  no   rash  LYMPH Nodes: No enlarged glands  ENDOCRINE: No heat or cold intolerance  MUSCULOSKELETAL: No joint pain or swelling  PSYCHIATRIC: No depression, anxiety  HEME/LYMPH: No easy bruising, or bleeding gums  ALLERGY AND IMMUNOLOGIC: No hives or eczema	    MEDICATIONS  (STANDING):  acetaminophen   IVPB .. 1000 milliGRAM(s) IV Intermittent once  allopurinol 100 milliGRAM(s) Oral daily  aspirin  chewable 81 milliGRAM(s) Oral daily  atorvastatin 80 milliGRAM(s) Oral at bedtime  carvedilol 12.5 milliGRAM(s) Oral every 12 hours  chlorhexidine 2% Cloths 1 Application(s) Topical <User Schedule>  clopidogrel Tablet 75 milliGRAM(s) Oral daily  epoetin wayne-epbx (RETACRIT) Injectable 34661 Unit(s) IV Push once  epoetin wayne-epbx (RETACRIT) Injectable 45738 Unit(s) IV Push once  heparin   Injectable 5000 Unit(s) SubCutaneous every 12 hours  insulin glargine Injectable (LANTUS) 10 Unit(s) SubCutaneous at bedtime  insulin lispro (ADMELOG) corrective regimen sliding scale   SubCutaneous three times a day before meals  insulin lispro Injectable (ADMELOG) 5 Unit(s) SubCutaneous three times a day before meals  levoFLOXacin  Tablet 500 milliGRAM(s) Oral every 48 hours  senna 2 Tablet(s) Oral at bedtime  tamsulosin 0.4 milliGRAM(s) Oral at bedtime  valsartan 40 milliGRAM(s) Oral daily    MEDICATIONS  (PRN):  guaiFENesin Oral Liquid (Sugar-Free) 100 milliGRAM(s) Oral every 6 hours PRN Cough      Vital Signs Last 24 Hrs  T(C): 37 (29 May 2023 04:52), Max: 37 (29 May 2023 04:52)  T(F): 98.6 (29 May 2023 04:52), Max: 98.6 (29 May 2023 04:52)  HR: 76 (29 May 2023 04:52) (68 - 76)  BP: 149/79 (29 May 2023 04:52) (127/63 - 173/83)  BP(mean): --  RR: 18 (29 May 2023 04:52) (18 - 18)  SpO2: 96% (29 May 2023 04:52) (94% - 97%)    Parameters below as of 29 May 2023 04:52  Patient On (Oxygen Delivery Method): room air      CAPILLARY BLOOD GLUCOSE      POCT Blood Glucose.: 171 mg/dL (29 May 2023 08:09)  POCT Blood Glucose.: 163 mg/dL (28 May 2023 21:30)  POCT Blood Glucose.: 182 mg/dL (28 May 2023 16:47)  POCT Blood Glucose.: 298 mg/dL (28 May 2023 12:15)    I&O's Summary    28 May 2023 07:01  -  29 May 2023 07:00  --------------------------------------------------------  IN: 120 mL / OUT: 250 mL / NET: -130 mL    29 May 2023 07:01  -  29 May 2023 09:11  --------------------------------------------------------  IN: 0 mL / OUT: 380 mL / NET: -380 mL          Appearance: Normal	  HEENT:   Normal oral mucosa, PERRL, EOMI	  Lymphatic: No lymphadenopathy  Cardiovascular: Normal S1 S2, No JVD  Respiratory: Lungs clear to auscultation	  Gastrointestinal:  Soft, Non-tender, + BS	  Skin: No rash, No ecchymoses	  Extremities:     LABS:                          Thyroid Stimulating Hormone, Serum: 0.84 uIU/mL (05-24 @ 07:28)          Consultant(s) Notes Reviewed:      Care Discussed with Consultants/Other Providers:

## 2023-05-29 NOTE — DISCHARGE NOTE NURSING/CASE MANAGEMENT/SOCIAL WORK - NSDCFUADDAPPT_GEN_ALL_CORE_FT
APPTS ARE READY TO BE MADE: [x ] YES    Best Family or Patient Contact (if needed):    Additional Information about above appointments (if needed):    1: Brief, ID   2:   3:     Other comments or requests:

## 2023-05-29 NOTE — DISCHARGE NOTE PROVIDER - NSDCMRMEDTOKEN_GEN_ALL_CORE_FT
acetaminophen 325 mg oral tablet: 1-2 tab(s) orally every 6 hours, As Needed  allopurinol 100 mg oral tablet: 1 tab(s) orally once a day (at bedtime)  aspirin 81 mg oral tablet, chewable: 1 tab(s) orally once a day  atorvastatin 80 mg oral tablet: 1 tab(s) orally once a day (at bedtime)  carvedilol 12.5 mg oral tablet: 1 tab(s) orally every 12 hours  cephalexin 500 mg oral capsule: 1 cap(s) orally once a day  clopidogrel 75 mg oral tablet: 1 tab(s) orally once a day  melatonin 3 mg oral tablet: 1 tab(s) orally once a day (at bedtime)  pregabalin 75 mg oral capsule: 1 cap(s) orally once a day (at bedtime)  Santyl 250 units/g topical ointment: Apply topically to affected area (left heel) once a day  senna leaf extract oral tablet: 2 tab(s) orally once a day (at bedtime)  sevelamer carbonate 800 mg oral tablet: 1 tab(s) orally 3 times a day (with meals)  tamsulosin 0.4 mg oral capsule: 1 cap(s) orally once a day (at bedtime)  Toujeo SoloStar 300 units/mL subcutaneous solution: 15 unit(s) subcutaneous once a day (at bedtime) as per blood sugar level  valsartan 40 mg oral tablet: 1 tab(s) orally once a day   acetaminophen 325 mg oral tablet: 1-2 tab(s) orally every 6 hours, As Needed  allopurinol 100 mg oral tablet: 1 tab(s) orally once a day (at bedtime)  aspirin 81 mg oral tablet, chewable: 1 tab(s) orally once a day  atorvastatin 80 mg oral tablet: 1 tab(s) orally once a day (at bedtime)  carvedilol 12.5 mg oral tablet: 1 tab(s) orally every 12 hours  clopidogrel 75 mg oral tablet: 1 tab(s) orally once a day  levoFLOXacin 500 mg oral tablet: 1 tab(s) orally every 48 hours  melatonin 3 mg oral tablet: 1 tab(s) orally once a day (at bedtime)  Santyl 250 units/g topical ointment: Apply topically to affected area (left heel) once a day  senna leaf extract oral tablet: 2 tab(s) orally once a day (at bedtime)  sevelamer carbonate 800 mg oral tablet: 1 tab(s) orally 3 times a day (with meals)  tamsulosin 0.4 mg oral capsule: 1 cap(s) orally once a day (at bedtime)  Toujeo SoloStar 300 units/mL subcutaneous solution: 15 unit(s) subcutaneous once a day (at bedtime) as per blood sugar level  valsartan 40 mg oral tablet: 1 tab(s) orally once a day   acetaminophen 325 mg oral tablet: 1-2 tab(s) orally every 6 hours, As Needed  allopurinol 100 mg oral tablet: 1 tab(s) orally once a day (at bedtime)  aspirin 81 mg oral tablet, chewable: 1 tab(s) orally once a day  atorvastatin 80 mg oral tablet: 1 tab(s) orally once a day (at bedtime)  carvedilol 12.5 mg oral tablet: 1 tab(s) orally every 12 hours  clopidogrel 75 mg oral tablet: 1 tab(s) orally once a day  levoFLOXacin 500 mg oral tablet: 1 tab(s) orally every 48 hours  Lyrica 75 mg oral capsule: 1 tab(s) orally once a day (at bedtime) HOLD FOR SEDATION  melatonin 3 mg oral tablet: 1 tab(s) orally once a day (at bedtime)  Santyl 250 units/g topical ointment: Apply topically to affected area (left heel) once a day  senna leaf extract oral tablet: 2 tab(s) orally once a day (at bedtime)  sevelamer carbonate 800 mg oral tablet: 1 tab(s) orally 3 times a day (with meals)  tamsulosin 0.4 mg oral capsule: 1 cap(s) orally once a day (at bedtime)  Toujeo SoloStar 300 units/mL subcutaneous solution: 15 unit(s) subcutaneous once a day (at bedtime) as per blood sugar level  valsartan 40 mg oral tablet: 1 tab(s) orally once a day

## 2023-05-29 NOTE — DISCHARGE NOTE PROVIDER - NSDCCPCAREPLAN_GEN_ALL_CORE_FT
PRINCIPAL DISCHARGE DIAGNOSIS  Diagnosis: Bacteremia  Assessment and Plan of Treatment: Levaquin 500mg every 48 hours for 3 more dose ( 5/31, 6/2, 6/4)      SECONDARY DISCHARGE DIAGNOSES  Diagnosis: ESRD on dialysis  Assessment and Plan of Treatment: Avoid taking (NSAIDs) - (ex: Ibuprofen, Advil, Celebrex, Naprosyn)  Avoid taking any nephrotoxic agents (can harm kidneys) - Intravenous contrast for diagnostic testing, combination cold medications.  Have all medications adjusted for your renal function by your Health Care Provider.  Blood pressure control is important.  Take all medication as prescribed.      Diagnosis: HTN (hypertension)  Assessment and Plan of Treatment: Follow up with your medical doctor to establish long term blood pressure treatment goals.      Diagnosis: DM2 (diabetes mellitus, type 2)  Assessment and Plan of Treatment: HgA1C this admission.  Make sure you get your HgA1c checked every three months.  If you take oral diabetes medications, check your blood glucose two times a day.  If you take insulin, check your blood glucose before meals and at bedtime.  It's important not to skip any meals.  Keep a log of your blood glucose results and always take it with you to your doctor appointments.  Keep a list of your current medications including injectables and over the counter medications and bring this medication list with you to all your doctor appointments.  If you have not seen your ophthalmologist this year call for appointment.  Check your feet daily for redness, sores, or openings. Do not self treat. If no improvement in two days call your primary care physician for an appointment.  Low blood sugar (hypoglycemia) is a blood sugar below 70mg/dl. Check your blood sugar if you feel signs/symptoms of hypoglycemia. If your blood sugar is below 70 take 15 grams of carbohydrates (ex 4 oz of apple juice, 3-4 glucose tablets, or 4-6 oz of regular soda) wait 15 minutes and repeat blood sugar to make sure it comes up above 70.  If your blood sugar is above 70 and you are due for a meal, have a meal.  If you are not due for a meal have a snack.  This snack helps keeps your blood sugar at a safe range.      Diagnosis: CAD (coronary artery disease)  Assessment and Plan of Treatment: Coronary artery disease is a condition where the arteries the supply the heart muscle get clogged with fatty deposits & puts you at risk for a heart attack  Call your doctor if you have any new pain, pressure, or discomfort in the center of your chest, pain, tingling or discomfort in arms, back, neck, jaw, or stomach, shortness of breath, nausea, vomiting, burping or heartburn, sweating, cold and clammy skin, racing or abnormal heartbeat for more than 10 minutes or if they keep coming & going.  Call 911 and do not tr to get to hospital by care  You can help yourself with lefestyle changes (quitting smoking if you smoke), eat lots of fruits & vegetables & low fat dairy products, not a lot of meat & fatty foods, walk or some form of physical activity most days of the week, lose weight if you are overweight  Take your cardiac medication as prescribed to lower cholesterol, to lower blood pressure, aspirin to prevent blood clots, and diabetes control  Make sure to keep appointments with doctor for cardiac follow up care      Diagnosis: Weakness  Assessment and Plan of Treatment: Follow up with neurology and PMD for managment    Diagnosis: Wound finding  Assessment and Plan of Treatment: Wound care     PRINCIPAL DISCHARGE DIAGNOSIS  Diagnosis: Bacteremia  Assessment and Plan of Treatment: Levaquin 500mg every 48 hours for 3 more dose ( 5/31, 6/2, 6/4)      SECONDARY DISCHARGE DIAGNOSES  Diagnosis: ESRD on dialysis  Assessment and Plan of Treatment: Avoid taking (NSAIDs) - (ex: Ibuprofen, Advil, Celebrex, Naprosyn)  Avoid taking any nephrotoxic agents (can harm kidneys) - Intravenous contrast for diagnostic testing, combination cold medications.  Have all medications adjusted for your renal function by your Health Care Provider.  Blood pressure control is important.  Take all medication as prescribed.      Diagnosis: HTN (hypertension)  Assessment and Plan of Treatment: Follow up with your medical doctor to establish long term blood pressure treatment goals.      Diagnosis: DM2 (diabetes mellitus, type 2)  Assessment and Plan of Treatment: HgA1C this admission.  Make sure you get your HgA1c checked every three months.  If you take oral diabetes medications, check your blood glucose two times a day.  If you take insulin, check your blood glucose before meals and at bedtime.  It's important not to skip any meals.  Keep a log of your blood glucose results and always take it with you to your doctor appointments.  Keep a list of your current medications including injectables and over the counter medications and bring this medication list with you to all your doctor appointments.  If you have not seen your ophthalmologist this year call for appointment.  Check your feet daily for redness, sores, or openings. Do not self treat. If no improvement in two days call your primary care physician for an appointment.  Low blood sugar (hypoglycemia) is a blood sugar below 70mg/dl. Check your blood sugar if you feel signs/symptoms of hypoglycemia. If your blood sugar is below 70 take 15 grams of carbohydrates (ex 4 oz of apple juice, 3-4 glucose tablets, or 4-6 oz of regular soda) wait 15 minutes and repeat blood sugar to make sure it comes up above 70.  If your blood sugar is above 70 and you are due for a meal, have a meal.  If you are not due for a meal have a snack.  This snack helps keeps your blood sugar at a safe range.      Diagnosis: CAD (coronary artery disease)  Assessment and Plan of Treatment: Coronary artery disease is a condition where the arteries the supply the heart muscle get clogged with fatty deposits & puts you at risk for a heart attack  Call your doctor if you have any new pain, pressure, or discomfort in the center of your chest, pain, tingling or discomfort in arms, back, neck, jaw, or stomach, shortness of breath, nausea, vomiting, burping or heartburn, sweating, cold and clammy skin, racing or abnormal heartbeat for more than 10 minutes or if they keep coming & going.  Call 911 and do not tr to get to hospital by care  You can help yourself with lefestyle changes (quitting smoking if you smoke), eat lots of fruits & vegetables & low fat dairy products, not a lot of meat & fatty foods, walk or some form of physical activity most days of the week, lose weight if you are overweight  Take your cardiac medication as prescribed to lower cholesterol, to lower blood pressure, aspirin to prevent blood clots, and diabetes control  Make sure to keep appointments with doctor for cardiac follow up care      Diagnosis: Weakness  Assessment and Plan of Treatment: Follow up with neurology and PMD for managment    Diagnosis: Wound finding  Assessment and Plan of Treatment: Follow up with Podiatry   Follow up with Dr. Madrid as scheduled on 6/5/23     PRINCIPAL DISCHARGE DIAGNOSIS  Diagnosis: Bacteremia  Assessment and Plan of Treatment: Levaquin 500mg every 48 hours for 3 more dose ( 5/31, 6/2, 6/4)      SECONDARY DISCHARGE DIAGNOSES  Diagnosis: ESRD on dialysis  Assessment and Plan of Treatment: Avoid taking (NSAIDs) - (ex: Ibuprofen, Advil, Celebrex, Naprosyn)  Avoid taking any nephrotoxic agents (can harm kidneys) - Intravenous contrast for diagnostic testing, combination cold medications.  Have all medications adjusted for your renal function by your Health Care Provider.  Blood pressure control is important.  Take all medication as prescribed.      Diagnosis: HTN (hypertension)  Assessment and Plan of Treatment: Follow up with your medical doctor to establish long term blood pressure treatment goals.      Diagnosis: DM2 (diabetes mellitus, type 2)  Assessment and Plan of Treatment: HgA1C this admission.  Make sure you get your HgA1c checked every three months.  If you take oral diabetes medications, check your blood glucose two times a day.  If you take insulin, check your blood glucose before meals and at bedtime.  It's important not to skip any meals.  Keep a log of your blood glucose results and always take it with you to your doctor appointments.  Keep a list of your current medications including injectables and over the counter medications and bring this medication list with you to all your doctor appointments.  If you have not seen your ophthalmologist this year call for appointment.  Check your feet daily for redness, sores, or openings. Do not self treat. If no improvement in two days call your primary care physician for an appointment.  Low blood sugar (hypoglycemia) is a blood sugar below 70mg/dl. Check your blood sugar if you feel signs/symptoms of hypoglycemia. If your blood sugar is below 70 take 15 grams of carbohydrates (ex 4 oz of apple juice, 3-4 glucose tablets, or 4-6 oz of regular soda) wait 15 minutes and repeat blood sugar to make sure it comes up above 70.  If your blood sugar is above 70 and you are due for a meal, have a meal.  If you are not due for a meal have a snack.  This snack helps keeps your blood sugar at a safe range.      Diagnosis: CAD (coronary artery disease)  Assessment and Plan of Treatment: Coronary artery disease is a condition where the arteries the supply the heart muscle get clogged with fatty deposits & puts you at risk for a heart attack  Call your doctor if you have any new pain, pressure, or discomfort in the center of your chest, pain, tingling or discomfort in arms, back, neck, jaw, or stomach, shortness of breath, nausea, vomiting, burping or heartburn, sweating, cold and clammy skin, racing or abnormal heartbeat for more than 10 minutes or if they keep coming & going.  Call 911 and do not tr to get to hospital by care  You can help yourself with lefestyle changes (quitting smoking if you smoke), eat lots of fruits & vegetables & low fat dairy products, not a lot of meat & fatty foods, walk or some form of physical activity most days of the week, lose weight if you are overweight  Take your cardiac medication as prescribed to lower cholesterol, to lower blood pressure, aspirin to prevent blood clots, and diabetes control  Make sure to keep appointments with doctor for cardiac follow up care      Diagnosis: Weakness  Assessment and Plan of Treatment: Follow up with neurology and PMD for managment    Diagnosis: Wound finding  Assessment and Plan of Treatment: Left heal wound ( since approx jan 2023), pressure wound.   Offloading recommended while in bed  CHanged dressing daily - aquacel and Gudelia  Defender boot  Follow up with Dr. Madrid as scheduled on 6/5/23     PRINCIPAL DISCHARGE DIAGNOSIS  Diagnosis: Bacteremia  Assessment and Plan of Treatment: Levaquin 500mg every 48 hours for 3 more dose ( 5/31, 6/2, 6/4)      SECONDARY DISCHARGE DIAGNOSES  Diagnosis: ESRD on dialysis  Assessment and Plan of Treatment: Avoid taking (NSAIDs) - (ex: Ibuprofen, Advil, Celebrex, Naprosyn)  Avoid taking any nephrotoxic agents (can harm kidneys) - Intravenous contrast for diagnostic testing, combination cold medications.  Have all medications adjusted for your renal function by your Health Care Provider.  Blood pressure control is important.  Take all medication as prescribed.      Diagnosis: HTN (hypertension)  Assessment and Plan of Treatment: Follow up with your medical doctor to establish long term blood pressure treatment goals.      Diagnosis: DM2 (diabetes mellitus, type 2)  Assessment and Plan of Treatment: HgA1C this admission.  Make sure you get your HgA1c checked every three months.  If you take oral diabetes medications, check your blood glucose two times a day.  If you take insulin, check your blood glucose before meals and at bedtime.  It's important not to skip any meals.  Keep a log of your blood glucose results and always take it with you to your doctor appointments.  Keep a list of your current medications including injectables and over the counter medications and bring this medication list with you to all your doctor appointments.  If you have not seen your ophthalmologist this year call for appointment.  Check your feet daily for redness, sores, or openings. Do not self treat. If no improvement in two days call your primary care physician for an appointment.  Low blood sugar (hypoglycemia) is a blood sugar below 70mg/dl. Check your blood sugar if you feel signs/symptoms of hypoglycemia. If your blood sugar is below 70 take 15 grams of carbohydrates (ex 4 oz of apple juice, 3-4 glucose tablets, or 4-6 oz of regular soda) wait 15 minutes and repeat blood sugar to make sure it comes up above 70.  If your blood sugar is above 70 and you are due for a meal, have a meal.  If you are not due for a meal have a snack.  This snack helps keeps your blood sugar at a safe range.      Diagnosis: CAD (coronary artery disease)  Assessment and Plan of Treatment: Coronary artery disease is a condition where the arteries the supply the heart muscle get clogged with fatty deposits & puts you at risk for a heart attack  Call your doctor if you have any new pain, pressure, or discomfort in the center of your chest, pain, tingling or discomfort in arms, back, neck, jaw, or stomach, shortness of breath, nausea, vomiting, burping or heartburn, sweating, cold and clammy skin, racing or abnormal heartbeat for more than 10 minutes or if they keep coming & going.  Call 911 and do not tr to get to hospital by care  You can help yourself with lefestyle changes (quitting smoking if you smoke), eat lots of fruits & vegetables & low fat dairy products, not a lot of meat & fatty foods, walk or some form of physical activity most days of the week, lose weight if you are overweight  Take your cardiac medication as prescribed to lower cholesterol, to lower blood pressure, aspirin to prevent blood clots, and diabetes control  Make sure to keep appointments with doctor for cardiac follow up care      Diagnosis: Weakness  Assessment and Plan of Treatment: Follow up with neurology and PMD for managment    Diagnosis: Wound finding  Assessment and Plan of Treatment: Left heal wound ( since approx jan 2023), pressure wound.   Offloading recommended while in bed  CHanged dressing daily - aquacel and Gudelia  Defender boot  Follow up with Dr. Madrid as scheduled on 6/5/23  ** Call office to see if can get an appointment early to replace Mirragen and wound veil

## 2023-05-29 NOTE — PROGRESS NOTE ADULT - SUBJECTIVE AND OBJECTIVE BOX
CC: f/u for pneumococcal pneumonia and bacteremia    Patient reports: he is anxious for discharge, non compliant with bloodwork    REVIEW OF SYSTEMS:  All other review of systems negative (Comprehensive ROS)    Antimicrobials Day #  :day 7/14  levoFLOXacin  Tablet 500 milliGRAM(s) Oral every 48 hours    Other Medications Reviewed  MEDICATIONS  (STANDING):  acetaminophen   IVPB .. 1000 milliGRAM(s) IV Intermittent once  allopurinol 100 milliGRAM(s) Oral daily  aspirin  chewable 81 milliGRAM(s) Oral daily  atorvastatin 80 milliGRAM(s) Oral at bedtime  carvedilol 12.5 milliGRAM(s) Oral every 12 hours  chlorhexidine 2% Cloths 1 Application(s) Topical <User Schedule>  clopidogrel Tablet 75 milliGRAM(s) Oral daily  epoetin wayne-epbx (RETACRIT) Injectable 40670 Unit(s) IV Push once  epoetin wayne-epbx (RETACRIT) Injectable 68009 Unit(s) IV Push once  heparin   Injectable 5000 Unit(s) SubCutaneous every 12 hours  insulin glargine Injectable (LANTUS) 10 Unit(s) SubCutaneous at bedtime  insulin lispro (ADMELOG) corrective regimen sliding scale   SubCutaneous three times a day before meals  insulin lispro Injectable (ADMELOG) 5 Unit(s) SubCutaneous three times a day before meals  levoFLOXacin  Tablet 500 milliGRAM(s) Oral every 48 hours  senna 2 Tablet(s) Oral at bedtime  tamsulosin 0.4 milliGRAM(s) Oral at bedtime  valsartan 40 milliGRAM(s) Oral daily    T(F): 98.6 (05-29-23 @ 04:52), Max: 98.6 (05-29-23 @ 04:52)  HR: 76 (05-29-23 @ 04:52)  BP: 149/79 (05-29-23 @ 04:52)  RR: 18 (05-29-23 @ 04:52)  SpO2: 96% (05-29-23 @ 04:52)  Wt(kg): --    PHYSICAL EXAM:  General: alert, no acute distress  Eyes:  anicteric, no conjunctival injection, no discharge  Oropharynx: no lesions or injection 	  Neck: supple, without adenopathy  Lungs: clear to auscultation  Heart: regular rate and rhythm; no murmur, rubs or gallops  Abdomen: soft, nondistended, nontender, without mass or organomegaly  Skin: no lesions  Extremities: no clubbing, cyanosis, or edema  Neurologic: alert, oriented, moves all extremities    LAB RESULTS:              MICROBIOLOGY:  RECENT CULTURES:      RADIOLOGY REVIEWED:

## 2023-05-29 NOTE — PROGRESS NOTE ADULT - REASON FOR ADMISSION
fever, r/o sepsis

## 2023-05-29 NOTE — PROVIDER CONTACT NOTE (OTHER) - REASON
Pt refusing insulin coverage for lunch since he is getting picked up now for HD
Pt refusing blood work

## 2023-05-29 NOTE — PROGRESS NOTE ADULT - SUBJECTIVE AND OBJECTIVE BOX
Date of Service: 05-29-23 @ 14:45           CARDIOLOGY     PROGRESS  NOTE   ________________________________________________    CHIEF COMPLAINT:Patient is a 63y old  Male who presents with a chief complaint of fever, r/o sepsis (29 May 2023 11:08)  no complain  	  REVIEW OF SYSTEMS:  CONSTITUTIONAL: No fever, weight loss, or fatigue  EYES: No eye pain, visual disturbances, or discharge  ENT:  No difficulty hearing, tinnitus, vertigo; No sinus or throat pain  NECK: No pain or stiffness  RESPIRATORY: No cough, wheezing, chills or hemoptysis; No Shortness of Breath  CARDIOVASCULAR: No chest pain, palpitations, passing out, dizziness, or leg swelling  GASTROINTESTINAL: No abdominal or epigastric pain. No nausea, vomiting, or hematemesis; No diarrhea or constipation. No melena or hematochezia.  GENITOURINARY: No dysuria, frequency, hematuria, or incontinence  NEUROLOGICAL: No headaches, memory loss, loss of strength, numbness, or tremors  SKIN: No itching, burning, rashes, or lesions   LYMPH Nodes: No enlarged glands  ENDOCRINE: No heat or cold intolerance; No hair loss  MUSCULOSKELETAL: No joint pain or swelling; No muscle, back, or extremity pain  PSYCHIATRIC: No depression, anxiety, mood swings, or difficulty sleeping  HEME/LYMPH: No easy bruising, or bleeding gums  ALLERGY AND IMMUNOLOGIC: No hives or eczema	    [ x] All others negative	  [ ] Unable to obtain    PHYSICAL EXAM:  T(C): 36.4 (05-29-23 @ 13:00), Max: 37 (05-29-23 @ 04:52)  HR: 73 (05-29-23 @ 13:00) (66 - 76)  BP: 143/66 (05-29-23 @ 13:00) (131/73 - 166/84)  RR: 18 (05-29-23 @ 13:00) (18 - 18)  SpO2: 98% (05-29-23 @ 13:00) (94% - 98%)  Wt(kg): --  I&O's Summary    28 May 2023 07:01  -  29 May 2023 07:00  --------------------------------------------------------  IN: 120 mL / OUT: 250 mL / NET: -130 mL    29 May 2023 07:01  -  29 May 2023 14:45  --------------------------------------------------------  IN: 0 mL / OUT: 380 mL / NET: -380 mL        Appearance: Normal	  HEENT:   Normal oral mucosa, PERRL, EOMI	  Lymphatic: No lymphadenopathy  Cardiovascular: Normal S1 S2, No JVD, + murmurs, No edema  Respiratory: Lungs clear to auscultation	  Psychiatry: A & O x 3, Mood & affect appropriate  Gastrointestinal:  Soft, Non-tender, + BS	  Skin: No rashes, No ecchymoses, No cyanosis	  Neurologic: Non-focal  Extremities: Normal range of motion, No clubbing, cyanosis or edema  Vascular: Peripheral pulses palpable 2+ bilaterally    MEDICATIONS  (STANDING):  acetaminophen   IVPB .. 1000 milliGRAM(s) IV Intermittent once  allopurinol 100 milliGRAM(s) Oral daily  aspirin  chewable 81 milliGRAM(s) Oral daily  atorvastatin 80 milliGRAM(s) Oral at bedtime  carvedilol 12.5 milliGRAM(s) Oral every 12 hours  chlorhexidine 2% Cloths 1 Application(s) Topical <User Schedule>  clopidogrel Tablet 75 milliGRAM(s) Oral daily  epoetin wayne-epbx (RETACRIT) Injectable 79847 Unit(s) IV Push once  heparin   Injectable 5000 Unit(s) SubCutaneous every 12 hours  insulin glargine Injectable (LANTUS) 10 Unit(s) SubCutaneous at bedtime  insulin lispro (ADMELOG) corrective regimen sliding scale   SubCutaneous three times a day before meals  insulin lispro Injectable (ADMELOG) 5 Unit(s) SubCutaneous three times a day before meals  levoFLOXacin  Tablet 500 milliGRAM(s) Oral every 48 hours  senna 2 Tablet(s) Oral at bedtime  tamsulosin 0.4 milliGRAM(s) Oral at bedtime  valsartan 40 milliGRAM(s) Oral daily      TELEMETRY: 	    ECG:  	  RADIOLOGY:  OTHER: 	  	  LABS:	 	    CARDIAC MARKERS:                                12.1   7.48  )-----------( 322      ( 29 May 2023 14:17 )             38.4     05-29    137  |  97  |  85<H>  ----------------------------<  210<H>  4.4   |  16<L>  |  6.56<H>    Ca    8.7      29 May 2023 14:17      proBNP:   Lipid Profile:   HgA1c:   TSH: Thyroid Stimulating Hormone, Serum: 0.84 uIU/mL (05-24 @ 07:28)          Assessment and plan  ---------------------------  62yo M, hx of ESRD on HD MWF, CAD s/p stents, CHF, DM, presents with generalized weakness for 1 day. Per wife, patient seemed weak before dialysis yesterday but worsened after dialysis. Unable to stand up, very sleepy, poor appetite, seemed confused. Also with fever.  pt with sig cardiac hx , chf, cad s/p multiple stents , ESRD on HD , with generalized weakness r/o sepsis  ID eval appreciated  check cultures  abx as per ID  pt with hx of ashd, ischemic cardiomyopathy s/p stents continue all cardiac meds  asa/plavix  increase trop doubt cardiac pt with no cardiac complain ?increase sec to renal failure  ESRD renl eval  dvt prophylaxis  chest x ray, will consider ct  endocrine eval  + strep Bacteriemia ID appreciated/ echo noted may need RACHEL  ct chest pulmonary edema, increase fluid withdrawal as per renal  continue all cardiac meds  podiatry consult  continue abx, ID appreciated  will consider possibilty of RACHEL to r/o endocarditis  pt with HD catheter ?source will  discuss with ID  increase valsartan if increase bp  repeat culture   continue abx, ID noted  dc planning after HD today on po abx as per ID  physical therapy

## 2023-05-30 DIAGNOSIS — M25.375 OTHER INSTABILITY, LEFT FOOT: ICD-10-CM

## 2023-05-30 NOTE — REVIEW OF SYSTEMS
[Recent Weight Loss (___ Lbs)] : recent [unfilled] ~Ulb weight loss [Joint Swelling] : joint swelling [Joint Stiffness] : joint stiffness [Skin Wound] : skin wound [As Noted in HPI] : as noted in HPI [Negative] : Heme/Lymph [FreeTextEntry2] : started 10/22 dialysis [FreeTextEntry3] : vision issues secondary to diabetes [FreeTextEntry5] : s/p multiple coronary stents [FreeTextEntry8] : dialysis [FreeTextEntry9] : ROULA [de-identified] : poorly controlled in past

## 2023-05-30 NOTE — ASSESSMENT
[FreeTextEntry1] : 03/22/2023\par Left heel pressure ulcer\par recent hospitalization with sepsis\par patient has been using santyl\par Patient will be [facility] now for Vascular studies and MARGARET\par importance of offloading given\par follow up 1 week\par follow up vascular surgery regarding LLE\par HBO discussed\par \par 4/12/2023-\par Wound straight depth 0.9cm, Probes to bone\par s/p excisional debridement\par HBE recommended\par Patient declined\par Patient declines offloading shoe\par \par 04/17/2023\par Left heel pressure ulcer\par TWO boot is not covered by insurance\par patient considering Hyperbaric oxygen therapy \par Evaluation will be done today\par Patient was evaluated for Hyperbaric treatment and is deemed a candidate for Hyperbaric oxygen therapy. Patient's h&p was reviewed and patient was given an orientation of the suite and treatment. Patient was educated on the risks and benefits of the treatment. Patient read the consent and signed it prior to the initiation of any screening procedure. Patient had the opportunity to discuss any questions regarding the Hyperbaric oxygen therapy. Writer witnessed the signing of the consent and the patient was given a copy of the signed consent.\par \par 5-10-23:\par Pt here for f/u\par Accompanied by wife\par No new complaints\par changing outer dressing daily\par Mirragen applied last week\par On exam\par left heel wound s/p mechanical debridement\par residual mirragen left in place.  More mirragen applied to granular tissue\par no s/s of infection\par \par \par 05/15/2023\par Pt here for f/u\par Accompanied by wife\par No new complaints\par changing outer dressing daily\par Mirragen applied last week,\par On exam\par left heel wound s/p mechanical debridement.  More mirragen applied to granular tissue\par no s/s of infection\par

## 2023-05-30 NOTE — PLAN
[FreeTextEntry1] : 3/8/23\par Plan - culture obtained\par santyl renewed to pharmacy\par tramadol dose adjusted to pharmacy for pain\par shower wound, santyl/gauze/foam/john, offload area\par supplies ordered\par follow up 1 week\par follow up vascular surgery regarding LLE\par Patient scheduled for vascular testing today\par patient not wearing offloading shoe as per family\par Offloading recommended\par patient scheduled for vascular testing.\par \par 4/12/23\par -Educated about benefits of HBO- pt states he will hold off for now\par -TWO boot ordered for patient \par -Plan: Dress wound with Santyl, wet to dry with vashe, john\par -Off-loading\par -RTO in 1 week\par \par 4/17/23\par Awaith for auth\par clear chest xray on file\par \par 5/3/23\par Mirragen applied to left heel, cover with aquacel and john\par off-loading\par Patient requires stabilization for medical reasons and has the potential to benefit functionally.\par follow up in 1 week\par \par 5-15-23:\par Mirragen applied to left heel, (application #3) wound veil placed.  cover with aquacel and john\par change outer  dressing 3x/week.  Leave wound veil in place\par offloading. Defender boot ordered.\par .follow up in 1 week\par

## 2023-05-30 NOTE — HISTORY OF PRESENT ILLNESS
[FreeTextEntry1] : Mr. ADAM KOWALSKI h/o claudication with diabetic foot ulcer presents to the office with wife for a wound since approx January. The wound is located on the left heel. Pt wife states he was hospitalized on 12/12/22 for a fall resulting in brain trauma. The patient was transferred to rehab after approx 2 weeks where they found the wound but were unsure how long it had been there for. The patient has complaints of pain to the touch.  The patient has been dressing the wound with dry dressing.  The patient denies fevers or chills. The patient has localized pain to the wound upon dressing changes. The patient has no other complaints or associated symptoms. Pt states HbA1c is approx 6 from recent blood work done a few days ago.\par PAD - recently s/p balloon angioplasty and arthrectomy left leg\par uncontrolled diabetes - ESRD on dialysis since 10/22\par left heel wound with eschar center, encircling that is dry callus ring, s/p excisional debridement\par discussed offloading, proper footwear to offload heel\par c/o pain 7/10\par \par The patient is ambulatory. He has a deformity of the left foot with ulceration. Patient requires stabilization and has the potential to improve mobility and functionality by using a Defender boot.\par \par

## 2023-05-31 ENCOUNTER — NON-APPOINTMENT (OUTPATIENT)
Age: 63
End: 2023-05-31

## 2023-06-05 ENCOUNTER — APPOINTMENT (OUTPATIENT)
Dept: WOUND CARE | Facility: CLINIC | Age: 63
End: 2023-06-05
Payer: MEDICARE

## 2023-06-05 ENCOUNTER — OUTPATIENT (OUTPATIENT)
Dept: OUTPATIENT SERVICES | Facility: HOSPITAL | Age: 63
LOS: 1 days | End: 2023-06-05
Payer: MEDICARE

## 2023-06-05 DIAGNOSIS — E11.319 TYPE 2 DIABETES MELLITUS WITH UNSPECIFIED DIABETIC RETINOPATHY WITHOUT MACULAR EDEMA: Chronic | ICD-10-CM

## 2023-06-05 DIAGNOSIS — I50.9 HEART FAILURE, UNSPECIFIED: Chronic | ICD-10-CM

## 2023-06-05 DIAGNOSIS — Z98.89 OTHER SPECIFIED POSTPROCEDURAL STATES: Chronic | ICD-10-CM

## 2023-06-05 DIAGNOSIS — Z95.5 PRESENCE OF CORONARY ANGIOPLASTY IMPLANT AND GRAFT: Chronic | ICD-10-CM

## 2023-06-05 PROCEDURE — 11042 DBRDMT SUBQ TIS 1ST 20SQCM/<: CPT

## 2023-06-05 NOTE — PHYSICAL EXAM
[0] : left 0 [Skin Ulcer] : ulcer [Alert] : alert [Oriented to Person] : oriented to person [Oriented to Place] : oriented to place [Oriented to Time] : oriented to time [Calm] : calm [Please See PDF for Tissue Analytics] : Please See PDF for [employer]. [Ankle Swelling (On Exam)] : not present [de-identified] : grimacing in pain, well groomed male, unstable gait, walking with walker [de-identified] : AT [de-identified] : supple [de-identified] : equal chest rise [de-identified] : uses walker

## 2023-06-05 NOTE — ASSESSMENT
[FreeTextEntry1] : 03/22/2023\par Left heel pressure ulcer\par recent hospitalization with sepsis\par patient has been using santyl\par Patient will be going [facility] now for Vascular studies and MARGARET\par importance of offloading given\par follow up 1 week\par follow up vascular surgery regarding LLE\par HBO discussed\par \par 4/12/2023-\par Wound straight depth 0.9cm, Probes to bone\par s/p excisional debridement\par HBE recommended\par Patient declined\par Patient declines offloading shoe\par \par 04/17/2023\par Left heel pressure ulcer\par TWO boot is not covered by insurance\par patient considering Hyperbaric oxygen therapy \par Evaluation will be done today\par Patient was evaluated for Hyperbaric treatment and is deemed a candidate for Hyperbaric oxygen therapy. Patient's h&p was reviewed and patient was given an orientation of the suite and treatment. Patient was educated on the risks and benefits of the treatment. Patient read the consent and signed it prior to the initiation of any screening procedure. Patient had the opportunity to discuss any questions regarding the Hyperbaric oxygen therapy. Writer witnessed the signing of the consent and the patient was given a copy of the signed consent.\par \par 5-10-23:\par Pt here for f/u\par Accompanied by wife\par No new complaints\par changing outer dressing daily\par Mirragen applied last week\par On exam\par left heel wound s/p mechanical debridement\par residual mirragen left in place.  More mirragen applied to granular tissue\par no s/s of infection\par \par \par 05/15/2023\par Pt here for f/u\par Accompanied by wife\par No new complaints\par changing outer dressing daily\par Mirragen applied last week,\par On exam\par left heel wound s/p mechanical debridement.  More mirragen applied to granular tissue\par no s/s of infection\par   \par 06/05/2023\par Pt here for f/u\par Accompanied by wife\par recent hospitalization with sepsis in blood, on abx\par No new complaints\par changing outer dressing daily\par Mirragen applied on last visit\par On exam\par left heel wound s/p mechanical debridement.  More mirragen applied to granular tissue\par no s/s of infection

## 2023-06-05 NOTE — PLAN
[FreeTextEntry1] : 3/8/23\par Plan - culture obtained\par santyl renewed to pharmacy\par tramadol dose adjusted to pharmacy for pain\par shower wound, santyl/gauze/foam/john, offload area\par supplies ordered\par follow up 1 week\par follow up vascular surgery regarding LLE\par Patient scheduled for vascular testing today\par patient not wearing offloading shoe as per family\par Offloading recommended\par patient scheduled for vascular testing.\par \par 4/12/23\par -Educated about benefits of HBO- pt states he will hold off for now\par -TWO boot ordered for patient \par -Plan: Dress wound with Santyl, wet to dry with vashe, john\par -Off-loading\par -RTO in 1 week\par \par 4/17/23\par Awaith for auth\par clear chest xray on file\par \par 5/3/23\par Mirragen applied to left heel, cover with aquacel and john\par off-loading\par Patient requires stabilization for medical reasons and has the potential to benefit functionally.\par follow up in 1 week\par \par 5-15-23:\par Mirragen applied to left heel, (application #3) wound veil placed.  cover with aquacel and john\par change outer  dressing 3x/week.  Leave wound veil in place\par offloading. Defender boot ordered.\par .follow up in 1 week\par \par 06/05/2023\par Mirragen applied to left heel, (application #4) wound veil placed.  cover with aquacel and john\par change outer  dressing 3x/week.  Leave wound veil in place\par offloading. Defender boot ordered.\par .follow up in 1 week

## 2023-06-05 NOTE — REVIEW OF SYSTEMS
[Recent Weight Loss (___ Lbs)] : recent [unfilled] ~Ulb weight loss [Joint Swelling] : joint swelling [Joint Stiffness] : joint stiffness [Skin Wound] : skin wound [As Noted in HPI] : as noted in HPI [Negative] : Heme/Lymph [FreeTextEntry2] : started 10/22 dialysis [FreeTextEntry3] : vision issues secondary to diabetes [FreeTextEntry5] : s/p multiple coronary stents [FreeTextEntry8] : dialysis [FreeTextEntry9] : ROULA [de-identified] : poorly controlled in past

## 2023-06-08 DIAGNOSIS — L97.509 NON-PRESSURE CHRONIC ULCER OF OTHER PART OF UNSPECIFIED FOOT WITH UNSPECIFIED SEVERITY: ICD-10-CM

## 2023-06-08 DIAGNOSIS — E11.9 TYPE 2 DIABETES MELLITUS WITHOUT COMPLICATIONS: ICD-10-CM

## 2023-06-08 DIAGNOSIS — E11.621 TYPE 2 DIABETES MELLITUS WITH FOOT ULCER: ICD-10-CM

## 2023-06-11 ENCOUNTER — RX ONLY (RX ONLY)
Age: 63
End: 2023-06-11

## 2023-06-11 RX ORDER — DULAGLUTIDE 0.75 MG/.5ML
INJECTION, SOLUTION SUBCUTANEOUS
Qty: 2 | Refills: 0 | Status: ACTIVE | COMMUNITY

## 2023-06-12 ENCOUNTER — RX ONLY (RX ONLY)
Age: 63
End: 2023-06-12

## 2023-06-12 RX ORDER — PREGABALIN 75 MG/1
CAPSULE ORAL
Qty: 90 | Refills: 0 | Status: ACTIVE | COMMUNITY

## 2023-06-13 ENCOUNTER — RX ONLY (RX ONLY)
Age: 63
End: 2023-06-13

## 2023-06-13 DIAGNOSIS — M21.6X2 OTHER ACQUIRED DEFORMITIES OF LEFT FOOT: ICD-10-CM

## 2023-06-13 DIAGNOSIS — E11.9 TYPE 2 DIABETES MELLITUS WITHOUT COMPLICATIONS: ICD-10-CM

## 2023-06-13 DIAGNOSIS — L89.623 PRESSURE ULCER OF LEFT HEEL, STAGE 3: ICD-10-CM

## 2023-06-13 DIAGNOSIS — M21.962 UNSPECIFIED ACQUIRED DEFORMITY OF LEFT LOWER LEG: ICD-10-CM

## 2023-06-13 RX ORDER — GABAPENTIN 300 MG/1
CAPSULE ORAL
Qty: 180 | Refills: 0 | Status: ACTIVE | COMMUNITY

## 2023-06-15 DIAGNOSIS — L89.624 PRESSURE ULCER OF LEFT HEEL, STAGE 4: ICD-10-CM

## 2023-06-19 ENCOUNTER — APPOINTMENT (OUTPATIENT)
Dept: WOUND CARE | Facility: CLINIC | Age: 63
End: 2023-06-19
Payer: MEDICARE

## 2023-06-19 ENCOUNTER — OUTPATIENT (OUTPATIENT)
Dept: OUTPATIENT SERVICES | Facility: HOSPITAL | Age: 63
LOS: 1 days | End: 2023-06-19
Payer: MEDICARE

## 2023-06-19 VITALS
OXYGEN SATURATION: 99 % | BODY MASS INDEX: 25.9 KG/M2 | SYSTOLIC BLOOD PRESSURE: 177 MMHG | TEMPERATURE: 98 F | DIASTOLIC BLOOD PRESSURE: 74 MMHG | HEART RATE: 80 BPM | WEIGHT: 165 LBS | HEIGHT: 67 IN

## 2023-06-19 DIAGNOSIS — Z98.89 OTHER SPECIFIED POSTPROCEDURAL STATES: Chronic | ICD-10-CM

## 2023-06-19 DIAGNOSIS — L98.491 NON-PRESSURE CHRONIC ULCER OF SKIN OF OTHER SITES LIMITED TO BREAKDOWN OF SKIN: ICD-10-CM

## 2023-06-19 DIAGNOSIS — Z95.5 PRESENCE OF CORONARY ANGIOPLASTY IMPLANT AND GRAFT: Chronic | ICD-10-CM

## 2023-06-19 DIAGNOSIS — E11.9 TYPE 2 DIABETES MELLITUS WITHOUT COMPLICATIONS: ICD-10-CM

## 2023-06-19 DIAGNOSIS — I50.9 HEART FAILURE, UNSPECIFIED: Chronic | ICD-10-CM

## 2023-06-19 DIAGNOSIS — E11.319 TYPE 2 DIABETES MELLITUS WITH UNSPECIFIED DIABETIC RETINOPATHY WITHOUT MACULAR EDEMA: Chronic | ICD-10-CM

## 2023-06-19 PROCEDURE — 0598T R-T FLUOR WOUND IMG 1ST SITE: CPT | Mod: RT

## 2023-06-19 PROCEDURE — 99213 OFFICE O/P EST LOW 20 MIN: CPT | Mod: 25

## 2023-06-20 ENCOUNTER — APPOINTMENT (OUTPATIENT)
Dept: RADIOLOGY | Facility: CLINIC | Age: 63
End: 2023-06-20
Payer: MEDICARE

## 2023-06-20 ENCOUNTER — OUTPATIENT (OUTPATIENT)
Dept: OUTPATIENT SERVICES | Facility: HOSPITAL | Age: 63
LOS: 1 days | End: 2023-06-20
Payer: MEDICARE

## 2023-06-20 DIAGNOSIS — Z95.5 PRESENCE OF CORONARY ANGIOPLASTY IMPLANT AND GRAFT: Chronic | ICD-10-CM

## 2023-06-20 DIAGNOSIS — Z98.89 OTHER SPECIFIED POSTPROCEDURAL STATES: Chronic | ICD-10-CM

## 2023-06-20 DIAGNOSIS — J13 PNEUMONIA DUE TO STREPTOCOCCUS PNEUMONIAE: ICD-10-CM

## 2023-06-20 DIAGNOSIS — E11.319 TYPE 2 DIABETES MELLITUS WITH UNSPECIFIED DIABETIC RETINOPATHY WITHOUT MACULAR EDEMA: Chronic | ICD-10-CM

## 2023-06-20 DIAGNOSIS — I50.9 HEART FAILURE, UNSPECIFIED: Chronic | ICD-10-CM

## 2023-06-20 PROCEDURE — 71046 X-RAY EXAM CHEST 2 VIEWS: CPT | Mod: 26

## 2023-06-20 PROCEDURE — 71046 X-RAY EXAM CHEST 2 VIEWS: CPT

## 2023-06-22 NOTE — PLAN
[FreeTextEntry1] : 3/8/23\par Plan - culture obtained\par santyl renewed to pharmacy\par tramadol dose adjusted to pharmacy for pain\par shower wound, santyl/gauze/foam/john, offload area\par supplies ordered\par follow up 1 week\par follow up vascular surgery regarding LLE\par Patient scheduled for vascular testing today\par patient not wearing offloading shoe as per family\par Offloading recommended\par patient scheduled for vascular testing.\par \par 4/12/23\par -Educated about benefits of HBO- pt states he will hold off for now\par -TWO boot ordered for patient \par -Plan: Dress wound with Santyl, wet to dry with vashe, john\par -Off-loading\par -RTO in 1 week\par \par 4/17/23\par Awaith for auth\par clear chest xray on file\par \par 5/3/23\par Mirragen applied to left heel, cover with aquacel and john\par off-loading\par Patient requires stabilization for medical reasons and has the potential to benefit functionally.\par follow up in 1 week\par \par 5-15-23:\par Mirragen applied to left heel, (application #3) wound veil placed.  cover with aquacel and john\par change outer  dressing 3x/week.  Leave wound veil in place\par offloading. Defender boot ordered.\par .follow up in 1 week\par \par 06/05/2023\par Mirragen applied to left heel, (application #4) wound veil placed.  cover with aquacel and john\par change outer  dressing 3x/week.  Leave wound veil in place\par offloading. Defender boot ordered.\par .follow up in 1 week\par \par 6/19/23\par Mirragen applied to left heel, (application #5) wound veil placed.  cover with Foam and jhon\par change outer  dressing 3x/week.  Leave wound veil in place\par offloading. Wearing Defender boot \par follow up in 1 week\par

## 2023-06-22 NOTE — HISTORY OF PRESENT ILLNESS
[FreeTextEntry1] : Mr. ADAM KOWALSKI h/o claudication with diabetic foot ulcer presents to the office with wife for a wound since approx January. The wound is located on the left heel. Pt wife states he was hospitalized on 12/12/22 for a fall resulting in brain trauma. The patient was transferred to rehab after approx 2 weeks where they found the wound but were unsure how long it had been there for. The patient has complaints of pain to the touch.  The patient has been dressing the wound with dry dressing.  The patient denies fevers or chills. The patient has localized pain to the wound upon dressing changes. The patient has no other complaints or associated symptoms. Pt states HbA1c is approx 6 from recent blood work done a few days ago.\par PAD - recently s/p balloon angioplasty and arthrectomy left leg\par uncontrolled diabetes - ESRD on dialysis since 10/22\par left heel wound with eschar center, encircling that is dry callus ring, s/p excisional debridement\par discussed offloading, proper footwear to offload heel\par c/o pain 7/10\par \par The patient is ambulatory. He has a deformity of the left foot with ulceration. Patient requires stabilization and has the potential to improve mobility and functionality by using a Defender boot.\par \par 6/18/23\par Wound improving. Less depth. Patient using Defender boot.

## 2023-06-22 NOTE — REVIEW OF SYSTEMS
[Recent Weight Loss (___ Lbs)] : recent [unfilled] ~Ulb weight loss [Joint Swelling] : joint swelling [Joint Stiffness] : joint stiffness [Skin Wound] : skin wound [As Noted in HPI] : as noted in HPI [Negative] : Heme/Lymph [FreeTextEntry2] : started 10/22 dialysis [FreeTextEntry3] : vision issues secondary to diabetes [FreeTextEntry5] : s/p multiple coronary stents [FreeTextEntry8] : dialysis [FreeTextEntry9] : ROULA [de-identified] : poorly controlled in past

## 2023-06-22 NOTE — ASSESSMENT
[FreeTextEntry1] : 03/22/2023\par Left heel pressure ulcer\par recent hospitalization with sepsis\par patient has been using santyl\par Patient will be going [facility] now for Vascular studies and MARGARET\par importance of offloading given\par follow up 1 week\par follow up vascular surgery regarding LLE\par HBO discussed\par \par 4/12/2023-\par Wound straight depth 0.9cm, Probes to bone\par s/p excisional debridement\par HBE recommended\par Patient declined\par Patient declines offloading shoe\par \par 04/17/2023\par Left heel pressure ulcer\par TWO boot is not covered by insurance\par patient considering Hyperbaric oxygen therapy \par Evaluation will be done today\par Patient was evaluated for Hyperbaric treatment and is deemed a candidate for Hyperbaric oxygen therapy. Patient's h&p was reviewed and patient was given an orientation of the suite and treatment. Patient was educated on the risks and benefits of the treatment. Patient read the consent and signed it prior to the initiation of any screening procedure. Patient had the opportunity to discuss any questions regarding the Hyperbaric oxygen therapy. Writer witnessed the signing of the consent and the patient was given a copy of the signed consent.\par \par 5-10-23:\par Pt here for f/u\par Accompanied by wife\par No new complaints\par changing outer dressing daily\par Mirragen applied last week\par On exam\par left heel wound s/p mechanical debridement\par residual mirragen left in place.  More mirragen applied to granular tissue\par no s/s of infection\par \par \par 05/15/2023\par Pt here for f/u\par Accompanied by wife\par No new complaints\par changing outer dressing daily\par Mirragen applied last week,\par On exam\par left heel wound s/p mechanical debridement.  More mirragen applied to granular tissue\par no s/s of infection\par   \par 06/05/2023\par Pt here for f/u\par Accompanied by wife\par recent hospitalization with sepsis in blood, on abx\par No new complaints\par changing outer dressing daily\par Mirragen applied on last visit\par On exam\par left heel wound s/p mechanical debridement.  More mirragen applied to granular tissue\par no s/s of infection\par \par 6/19/23\par Pt here for f/u\par No new complaints\par changing outer dressing daily\par Mirragen applied on last visit\par On exam:\par  Wound bed is red with small amount of serosanguineous drainage, periwound skin dry and scaly.\par left heel wound s/p mechanical debridement.  \par \par The patient was positioned to allow for optimization of imaging. The fluorescence imaging device was placed 8-12 cm from the patient and the clinical darkness required for imaging was obtained by a dark drape. The wound measured TA . The [employer] i:X fluorescence imaging device was used to report presence and location of cyan fluorescence, indicative of bacteria at loads greater than 104 CFU/g in real-time. Images reported to have cyan fluorescence. The wound was mechanically cleansed with Vashe and underwent mechanical debridement. Fluorescence images acquired after cleansing demonstrated reduction in bacteria.\par no s/s of infection\par  More mirragen applied to granular tissue today.\par  Product secured with wound veil.(sterile strips and bolster dressing). Covered with a foam dressing/Gudelia\par Patient to f/u in 1 week.\par \par

## 2023-06-22 NOTE — PHYSICAL EXAM
[0] : left 0 [Skin Ulcer] : ulcer [Alert] : alert [Oriented to Person] : oriented to person [Oriented to Place] : oriented to place [Oriented to Time] : oriented to time [Calm] : calm [Please See PDF for Tissue Analytics] : Please See PDF for [employer]. [Ankle Swelling (On Exam)] : not present [de-identified] : grimacing in pain, well groomed male, unstable gait, walking with walker [de-identified] : AT [de-identified] : supple [de-identified] : equal chest rise [de-identified] : uses walker

## 2023-06-26 DIAGNOSIS — E11.621 TYPE 2 DIABETES MELLITUS WITH FOOT ULCER: ICD-10-CM

## 2023-06-26 DIAGNOSIS — L97.519 NON-PRESSURE CHRONIC ULCER OF OTHER PART OF RIGHT FOOT WITH UNSPECIFIED SEVERITY: ICD-10-CM

## 2023-07-03 ENCOUNTER — OUTPATIENT (OUTPATIENT)
Dept: OUTPATIENT SERVICES | Facility: HOSPITAL | Age: 63
LOS: 1 days | End: 2023-07-03
Payer: MEDICARE

## 2023-07-03 ENCOUNTER — APPOINTMENT (OUTPATIENT)
Dept: WOUND CARE | Facility: CLINIC | Age: 63
End: 2023-07-03
Payer: MEDICARE

## 2023-07-03 DIAGNOSIS — I50.9 HEART FAILURE, UNSPECIFIED: Chronic | ICD-10-CM

## 2023-07-03 DIAGNOSIS — Z95.5 PRESENCE OF CORONARY ANGIOPLASTY IMPLANT AND GRAFT: Chronic | ICD-10-CM

## 2023-07-03 DIAGNOSIS — E11.319 TYPE 2 DIABETES MELLITUS WITH UNSPECIFIED DIABETIC RETINOPATHY WITHOUT MACULAR EDEMA: Chronic | ICD-10-CM

## 2023-07-03 DIAGNOSIS — Z98.89 OTHER SPECIFIED POSTPROCEDURAL STATES: Chronic | ICD-10-CM

## 2023-07-03 PROCEDURE — G0463: CPT

## 2023-07-03 PROCEDURE — 99213 OFFICE O/P EST LOW 20 MIN: CPT

## 2023-07-03 NOTE — PLAN
[FreeTextEntry1] : 3/8/23\par Plan - culture obtained\par santyl renewed to pharmacy\par tramadol dose adjusted to pharmacy for pain\par shower wound, santyl/gauze/foam/john, offload area\par supplies ordered\par follow up 1 week\par follow up vascular surgery regarding LLE\par Patient scheduled for vascular testing today\par patient not wearing offloading shoe as per family\par Offloading recommended\par patient scheduled for vascular testing.\par \par 4/12/23\par -Educated about benefits of HBO- pt states he will hold off for now\par -TWO boot ordered for patient \par -Plan: Dress wound with Santyl, wet to dry with vashe, john\par -Off-loading\par -RTO in 1 week\par \par 4/17/23\par Awaith for auth\par clear chest xray on file\par \par 5/3/23\par Mirragen applied to left heel, cover with aquacel and john\par off-loading\par Patient requires stabilization for medical reasons and has the potential to benefit functionally.\par follow up in 1 week\par \par 5-15-23:\par Mirragen applied to left heel, (application #3) wound veil placed.  cover with aquacel and john\par change outer  dressing 3x/week.  Leave wound veil in place\par offloading. Defender boot ordered.\par .follow up in 1 week\par \par 06/05/2023\par Mirragen applied to left heel, (application #4) wound veil placed.  cover with aquacel and john\par change outer  dressing 3x/week.  Leave wound veil in place\par offloading. Defender boot ordered.\par .follow up in 1 week\par \par 6/19/23\par Mirragen applied to left heel, (application #5) wound veil placed.  cover with Foam and john\par change outer  dressing 3x/week.  Leave wound veil in place\par offloading. Wearing Defender boot \par follow up in 1 week\par \par 7/3/23\par Plan:\par Leave wound open to air\par Follow up Telehealth next two weeks.

## 2023-07-03 NOTE — REVIEW OF SYSTEMS
[Recent Weight Loss (___ Lbs)] : recent [unfilled] ~Ulb weight loss [Joint Swelling] : joint swelling [Joint Stiffness] : joint stiffness [Skin Wound] : skin wound [As Noted in HPI] : as noted in HPI [Negative] : Heme/Lymph [FreeTextEntry2] : started 10/22 dialysis [FreeTextEntry3] : vision issues secondary to diabetes [FreeTextEntry5] : s/p multiple coronary stents [FreeTextEntry8] : dialysis [FreeTextEntry9] : ROULA [de-identified] : poorly controlled in past

## 2023-07-03 NOTE — ASSESSMENT
[FreeTextEntry1] : 03/22/2023\par Left heel pressure ulcer\par recent hospitalization with sepsis\par patient has been using santyl\par Patient will be going [facility] now for Vascular studies and MARGARET\par importance of offloading given\par follow up 1 week\par follow up vascular surgery regarding LLE\par HBO discussed\par \par 4/12/2023-\par Wound straight depth 0.9cm, Probes to bone\par s/p excisional debridement\par HBE recommended\par Patient declined\par Patient declines offloading shoe\par \par 04/17/2023\par Left heel pressure ulcer\par TWO boot is not covered by insurance\par patient considering Hyperbaric oxygen therapy \par Evaluation will be done today\par Patient was evaluated for Hyperbaric treatment and is deemed a candidate for Hyperbaric oxygen therapy. Patient's h&p was reviewed and patient was given an orientation of the suite and treatment. Patient was educated on the risks and benefits of the treatment. Patient read the consent and signed it prior to the initiation of any screening procedure. Patient had the opportunity to discuss any questions regarding the Hyperbaric oxygen therapy. Writer witnessed the signing of the consent and the patient was given a copy of the signed consent.\par \par 5-10-23:\par Pt here for f/u\par Accompanied by wife\par No new complaints\par changing outer dressing daily\par Mirragen applied last week\par On exam\par left heel wound s/p mechanical debridement\par residual mirragen left in place.  More mirragen applied to granular tissue\par no s/s of infection\par \par \par 05/15/2023\par Pt here for f/u\par Accompanied by wife\par No new complaints\par changing outer dressing daily\par Mirragen applied last week,\par On exam\par left heel wound s/p mechanical debridement.  More mirragen applied to granular tissue\par no s/s of infection\par   \par 06/05/2023\par Pt here for f/u\par Accompanied by wife\par recent hospitalization with sepsis in blood, on abx\par No new complaints\par changing outer dressing daily\par Mirragen applied on last visit\par On exam\par left heel wound s/p mechanical debridement.  More mirragen applied to granular tissue\par no s/s of infection\par \par 6/19/23\par Pt here for f/u\par No new complaints\par changing outer dressing daily\par Mirragen applied on last visit\par On exam:\par  Wound bed is red with small amount of serosanguineous drainage, periwound skin dry and scaly.\par left heel wound s/p mechanical debridement.  \par \par The patient was positioned to allow for optimization of imaging. The fluorescence imaging device was placed 8-12 cm from the patient and the clinical darkness required for imaging was obtained by a dark drape. The wound measured TA . The [employer] i:X fluorescence imaging device was used to report presence and location of cyan fluorescence, indicative of bacteria at loads greater than 104 CFU/g in real-time. Images reported to have cyan fluorescence. The wound was mechanically cleansed with Vashe and underwent mechanical debridement. Fluorescence images acquired after cleansing demonstrated reduction in bacteria.\par no s/s of infection\par  More mirragen applied to granular tissue today.\par  Product secured with wound veil.(sterile strips and bolster dressing). Covered with a foam dressing/Gudelia\par Patient to f/u in 1 week.\par \par 7/3/23\par Pt here for f/u\par No new complaints\par changing outer dressing daily\par Mirragen applied on last visit\par On exam:\par Wound bed is covered with a scab from Mirragen product. Periwound skin dry and scaly.\par left heel wound s/p mechanical debridement.  \par No new Mirragen applied\par Cavilon applied, no dressing open to air\par Continue wearing Defender boot\par Patient's BP elevated at this visit. Recommended patient speak with PCP concerning elevated BP and  his BP medication.\par \par \par

## 2023-07-03 NOTE — PHYSICAL EXAM
[0] : left 0 [Skin Ulcer] : ulcer [Alert] : alert [Oriented to Person] : oriented to person [Oriented to Place] : oriented to place [Oriented to Time] : oriented to time [Calm] : calm [Please See PDF for Tissue Analytics] : Please See PDF for [employer]. [de-identified] : grimacing in pain, well groomed male, unstable gait, walking with walker [Ankle Swelling (On Exam)] : not present [de-identified] : AT [de-identified] : supple [de-identified] : uses walker [de-identified] : equal chest rise

## 2023-07-05 ENCOUNTER — APPOINTMENT (OUTPATIENT)
Dept: OPHTHALMOLOGY | Facility: CLINIC | Age: 63
End: 2023-07-05
Payer: MEDICARE

## 2023-07-05 ENCOUNTER — NON-APPOINTMENT (OUTPATIENT)
Age: 63
End: 2023-07-05

## 2023-07-05 PROCEDURE — 92004 COMPRE OPH EXAM NEW PT 1/>: CPT

## 2023-07-05 PROCEDURE — 92134 CPTRZ OPH DX IMG PST SGM RTA: CPT

## 2023-07-07 ENCOUNTER — RX ONLY (RX ONLY)
Age: 63
End: 2023-07-07

## 2023-07-07 RX ORDER — TAMSULOSIN HYDROCHLORIDE 0.4 MG/1
CAPSULE ORAL
Qty: 90 | Refills: 0 | Status: ACTIVE | COMMUNITY

## 2023-07-08 ENCOUNTER — RX ONLY (RX ONLY)
Age: 63
End: 2023-07-08

## 2023-07-08 RX ORDER — CIPROFLOXACIN HYDROCHLORIDE 3 MG/ML
SOLUTION/ DROPS OPHTHALMIC
Qty: 5 | Refills: 0 | Status: ACTIVE | COMMUNITY

## 2023-07-10 ENCOUNTER — OFFICE (OUTPATIENT)
Dept: URBAN - METROPOLITAN AREA CLINIC 90 | Facility: CLINIC | Age: 63
Setting detail: OPHTHALMOLOGY
End: 2023-07-10
Payer: MEDICARE

## 2023-07-10 ENCOUNTER — RX ONLY (RX ONLY)
Age: 63
End: 2023-07-10

## 2023-07-10 DIAGNOSIS — H26.492: ICD-10-CM

## 2023-07-10 DIAGNOSIS — B30.9: ICD-10-CM

## 2023-07-10 PROCEDURE — 92002 INTRM OPH EXAM NEW PATIENT: CPT | Performed by: OPHTHALMOLOGY

## 2023-07-10 ASSESSMENT — VISUAL ACUITY
OS_BCVA: 20/30-2
OD_BCVA: CF 3FT

## 2023-07-10 ASSESSMENT — CONFRONTATIONAL VISUAL FIELD TEST (CVF)
OS_FINDINGS: FULL
OD_FINDINGS: FULL

## 2023-07-13 NOTE — OCCUPATIONAL THERAPY INITIAL EVALUATION ADULT - BALANCE DISTURBANCE, SYSTEM IMPAIRMENT CONTRIBUTE, REHAB EVAL
Pt's mother Jasson called the office and asked if an RN could call her back and go over her daughter's lab results with her that her daughter had done last month. Pt is a former [facility] pt and has not had her results read back to her yet, Please call the pt's mother back and convey those results back to them when you can. Thank you.   
neuromuscular/musculoskeletal
neuromuscular/musculoskeletal

## 2023-07-14 DIAGNOSIS — E11.9 TYPE 2 DIABETES MELLITUS WITHOUT COMPLICATIONS: ICD-10-CM

## 2023-07-14 DIAGNOSIS — E11.621 TYPE 2 DIABETES MELLITUS WITH FOOT ULCER: ICD-10-CM

## 2023-07-14 DIAGNOSIS — L97.509 NON-PRESSURE CHRONIC ULCER OF OTHER PART OF UNSPECIFIED FOOT WITH UNSPECIFIED SEVERITY: ICD-10-CM

## 2023-07-17 ENCOUNTER — APPOINTMENT (OUTPATIENT)
Dept: WOUND CARE | Facility: CLINIC | Age: 63
End: 2023-07-17

## 2023-07-18 NOTE — ED PROVIDER NOTE - RADIATION
[facility] ambulatory encounter  FAMILY PRACTICE OFFICE VISIT    CHIEF COMPLAINT:    Chief Complaint   Patient presents with   • Office Visit     Rash on ride side, from waistline to armpit       SUBJECTIVE:  Daron Liang is a 32 year old male who presented requesting evaluation for rash on right side     Onset of rash about 2-3 weeks. Over the last 2 days rash has been pruritus. There is no tenderness to palpation or drainage.  No change in size but maybe new rash lesions since onset. No over-the-counter or home treatment so far. No close contacts with rash. Recent travel to [place] for few days but rash preceded this trip.    Review of systems:   As above     OBJECTIVE:  PROBLEM LIST:   Patient Active Problem List   Diagnosis   • Allergic rhinitis   • Allergic conjunctivitis of both eyes   • Desensitization to allergens-Start 10/4/18, maintenance 11/2019   • Attention deficit hyperactivity disorder (ADHD), predominantly inattentive type       PAST HISTORIES:   I have reviewed the past medical history, family history, social history, medications and allergies listed in the medical record as obtained by my nursing staff and support staff and agree with their documentation.    PHYSICAL EXAM:   Visit Vitals  /76   Pulse 63   Temp 97 °F (36.1 °C) (Temporal)   Ht 5' 11\" (1.803 m)   Wt 97.5 kg (215 lb)   SpO2 98%   BMI 29.99 kg/m²     General Appearance:  Alert, cooperative, no distress, appears stated age.  Lungs:  Respirations unlabored.  Skin:  Skin color, texture, turgor normal. Scattered small flat circular dry patches of skin over the right waist/flank/chest/axilla.     LAB RESULTS:   No lab tests performed today    ASSESSMENT:   1. Dermatitis        PLAN:   Orders Placed This Encounter   • triamcinolone (ARISTOCORT) 0.1 % cream       1. Dermatitis:  Unclear etiology of rash but does not appear to be infectious. At this time will treat with triamcinolone cream. Follow up recommended if any worsening or not  improving.     Return if symptoms worsen or fail to improve.    Instructions provided as documented in the after visit summary.    The patient indicated understanding of the diagnosis and agreed with the plan of care.     Geovanni Castrejon MD         neck

## 2023-07-19 ENCOUNTER — APPOINTMENT (OUTPATIENT)
Dept: VASCULAR SURGERY | Facility: CLINIC | Age: 63
End: 2023-07-19

## 2023-07-21 ENCOUNTER — APPOINTMENT (OUTPATIENT)
Dept: WOUND CARE | Facility: CLINIC | Age: 63
End: 2023-07-21

## 2023-08-22 ENCOUNTER — APPOINTMENT (OUTPATIENT)
Dept: ENDOCRINOLOGY | Facility: CLINIC | Age: 63
End: 2023-08-22

## 2023-08-30 ENCOUNTER — APPOINTMENT (OUTPATIENT)
Dept: WOUND CARE | Facility: CLINIC | Age: 63
End: 2023-08-30

## 2023-09-11 ENCOUNTER — APPOINTMENT (OUTPATIENT)
Dept: OPHTHALMOLOGY | Facility: CLINIC | Age: 63
End: 2023-09-11
Payer: MEDICARE

## 2023-09-11 ENCOUNTER — NON-APPOINTMENT (OUTPATIENT)
Age: 63
End: 2023-09-11

## 2023-09-11 PROCEDURE — 66821 AFTER CATARACT LASER SURGERY: CPT | Mod: 54,LT

## 2023-09-18 ENCOUNTER — APPOINTMENT (OUTPATIENT)
Dept: OPHTHALMOLOGY | Facility: CLINIC | Age: 63
End: 2023-09-18
Payer: MEDICARE

## 2023-09-18 ENCOUNTER — NON-APPOINTMENT (OUTPATIENT)
Age: 63
End: 2023-09-18

## 2023-09-18 PROCEDURE — 99024 POSTOP FOLLOW-UP VISIT: CPT

## 2023-09-28 ENCOUNTER — OUTPATIENT (OUTPATIENT)
Dept: OUTPATIENT SERVICES | Facility: HOSPITAL | Age: 63
LOS: 1 days | End: 2023-09-28
Payer: MEDICARE

## 2023-09-28 ENCOUNTER — APPOINTMENT (OUTPATIENT)
Dept: WOUND CARE | Facility: CLINIC | Age: 63
End: 2023-09-28
Payer: MEDICARE

## 2023-09-28 VITALS
BODY MASS INDEX: 25.9 KG/M2 | SYSTOLIC BLOOD PRESSURE: 150 MMHG | HEART RATE: 84 BPM | OXYGEN SATURATION: 98 % | TEMPERATURE: 97.7 F | DIASTOLIC BLOOD PRESSURE: 78 MMHG | WEIGHT: 165 LBS | HEIGHT: 67 IN

## 2023-09-28 DIAGNOSIS — E11.319 TYPE 2 DIABETES MELLITUS WITH UNSPECIFIED DIABETIC RETINOPATHY WITHOUT MACULAR EDEMA: Chronic | ICD-10-CM

## 2023-09-28 DIAGNOSIS — Z98.89 OTHER SPECIFIED POSTPROCEDURAL STATES: Chronic | ICD-10-CM

## 2023-09-28 DIAGNOSIS — Z95.5 PRESENCE OF CORONARY ANGIOPLASTY IMPLANT AND GRAFT: Chronic | ICD-10-CM

## 2023-09-28 DIAGNOSIS — I50.9 HEART FAILURE, UNSPECIFIED: Chronic | ICD-10-CM

## 2023-09-28 PROCEDURE — G0463: CPT

## 2023-09-28 PROCEDURE — 99213 OFFICE O/P EST LOW 20 MIN: CPT

## 2023-10-04 DIAGNOSIS — L97.509 NON-PRESSURE CHRONIC ULCER OF OTHER PART OF UNSPECIFIED FOOT WITH UNSPECIFIED SEVERITY: ICD-10-CM

## 2023-10-04 DIAGNOSIS — E11.621 TYPE 2 DIABETES MELLITUS WITH FOOT ULCER: ICD-10-CM

## 2023-10-04 DIAGNOSIS — E11.42 TYPE 2 DIABETES MELLITUS WITH DIABETIC POLYNEUROPATHY: ICD-10-CM

## 2023-10-09 ENCOUNTER — NON-APPOINTMENT (OUTPATIENT)
Age: 63
End: 2023-10-09

## 2023-10-09 ENCOUNTER — APPOINTMENT (OUTPATIENT)
Dept: OPHTHALMOLOGY | Facility: CLINIC | Age: 63
End: 2023-10-09
Payer: MEDICARE

## 2023-10-09 PROCEDURE — 66821 AFTER CATARACT LASER SURGERY: CPT | Mod: 78,LT

## 2023-10-16 ENCOUNTER — NON-APPOINTMENT (OUTPATIENT)
Age: 63
End: 2023-10-16

## 2023-10-16 ENCOUNTER — APPOINTMENT (OUTPATIENT)
Dept: OPHTHALMOLOGY | Facility: CLINIC | Age: 63
End: 2023-10-16
Payer: MEDICARE

## 2023-10-16 PROCEDURE — 99024 POSTOP FOLLOW-UP VISIT: CPT

## 2023-10-23 ENCOUNTER — EMERGENCY (EMERGENCY)
Facility: HOSPITAL | Age: 63
LOS: 1 days | Discharge: ANOTHER HOSPITAL | End: 2023-10-23
Attending: EMERGENCY MEDICINE
Payer: MEDICARE

## 2023-10-23 VITALS
WEIGHT: 179.9 LBS | OXYGEN SATURATION: 98 % | DIASTOLIC BLOOD PRESSURE: 62 MMHG | HEIGHT: 67 IN | SYSTOLIC BLOOD PRESSURE: 163 MMHG | TEMPERATURE: 98 F | RESPIRATION RATE: 18 BRPM | HEART RATE: 82 BPM

## 2023-10-23 DIAGNOSIS — Z95.5 PRESENCE OF CORONARY ANGIOPLASTY IMPLANT AND GRAFT: Chronic | ICD-10-CM

## 2023-10-23 DIAGNOSIS — Z98.89 OTHER SPECIFIED POSTPROCEDURAL STATES: Chronic | ICD-10-CM

## 2023-10-23 DIAGNOSIS — E11.319 TYPE 2 DIABETES MELLITUS WITH UNSPECIFIED DIABETIC RETINOPATHY WITHOUT MACULAR EDEMA: Chronic | ICD-10-CM

## 2023-10-23 DIAGNOSIS — I50.9 HEART FAILURE, UNSPECIFIED: Chronic | ICD-10-CM

## 2023-10-23 DIAGNOSIS — T17.208A UNSPECIFIED FOREIGN BODY IN PHARYNX CAUSING OTHER INJURY, INITIAL ENCOUNTER: ICD-10-CM

## 2023-10-23 PROCEDURE — 74019 RADEX ABDOMEN 2 VIEWS: CPT | Mod: 26

## 2023-10-23 PROCEDURE — 31505 DIAGNOSTIC LARYNGOSCOPY: CPT

## 2023-10-23 PROCEDURE — 72040 X-RAY EXAM NECK SPINE 2-3 VW: CPT | Mod: 26

## 2023-10-23 PROCEDURE — 71046 X-RAY EXAM CHEST 2 VIEWS: CPT | Mod: 26

## 2023-10-23 PROCEDURE — 99285 EMERGENCY DEPT VISIT HI MDM: CPT | Mod: GC

## 2023-10-23 NOTE — ED ADULT NURSE NOTE - OBJECTIVE STATEMENT
63y M presenting after allegedly swallowing his retainer  - the retainer was plastic with 1 tooth attached - no metal. pt statess it happened at approx 2000 when taking regular PO meds. PMHx DM, CKD - dialysis MWF L AV fistula, MI, 4 stents. airway intact - pt breathing spontaneously, unlabored, sating at 100% on RA. SWallowing ability intact.

## 2023-10-23 NOTE — CONSULT NOTE ADULT - SUBJECTIVE AND OBJECTIVE BOX
CC:    HPI:       PAST MEDICAL & SURGICAL HISTORY:  Diabetes Mellitus Type II, Uncontrolled      Dyslipidemia      s/p Angioplasty with Stent      HTN (hypertension)      Gout      Diabetic retinopathy      GERD (gastroesophageal reflux disease)      Nephrolithiasis      Vitamin D deficiency      Hepatitis      CKD (chronic kidney disease)      Ischemic cardiomyopathy      ASHD (arteriosclerotic heart disease)      Pulmonary hypertension      Myocardial infarction      CAD (coronary artery disease)      BPH (benign prostatic hyperplasia)      Retinal Hemorrhage      S/P coronary artery stent placement  2010 TRICIA to Diag ; 11/18/2010  LAD; 2/4/11 ATOM liberte Diag; 5/15/2017  TRICIA to 1st diag; 6/7/17 PCI with laser and high pressure balloon      History of eye surgery  left eye      H/O lithotripsy      Diabetes with retinopathy  S/P PPV/PRP/GAS  OD 2/22/17 for viterous hemorrhage      CHF with cardiomyopathy  Insertion of Cardiomems monitor      Stented coronary artery        Allergies    shellfish (Unknown)  No Known Drug Allergies    Intolerances      MEDICATIONS  (STANDING):    MEDICATIONS  (PRN):      Social History: **??**    Family history: **??**    ROS:   ENT: all negative except as noted in HPI   CV: denies palpitations  Pulm: denies SOB, cough, hemoptysis  GI: denies change in appetite, indigestion, n/v  : denies pertinent urinary symptoms, urgency  Neuro: denies numbness/tingling, loss of sensation  Psych: denies anxiety  MS: denies muscle weakness, instability  Heme: denies easy bruising or bleeding  Endo: denies heat/cold intolerance, excessive sweating  Vascular: denies LE edema    Vital Signs Last 24 Hrs  T(C): 36.7 (23 Oct 2023 20:53), Max: 36.7 (23 Oct 2023 20:53)  T(F): 98.1 (23 Oct 2023 20:53), Max: 98.1 (23 Oct 2023 20:53)  HR: 82 (23 Oct 2023 20:53) (82 - 82)  BP: 163/62 (23 Oct 2023 20:53) (163/62 - 163/62)  BP(mean): --  RR: 18 (23 Oct 2023 20:53) (18 - 18)  SpO2: 98% (23 Oct 2023 20:53) (98% - 98%)                  PHYSICAL EXAM:  Gen: NAD  Skin: No rashes, bruises, or lesions  Head: Normocephalic, Atraumatic  Face: no edema, erythema, or fluctuance. Parotid glands soft without mass  Eyes: no scleral injection  Ears: Right: ear canal clear, TM intact without effusion or erythema. No evidence of any fluid drainage. No mastoid tenderness, erythema, or ear bulging            Left: ear canal clear, TM intact without effusion or erythema. No evidence of any fluid drainage. No mastoid tenderness, erythema, or ear bulging  Nose: Nares bilaterally patent, no discharge  Mouth: No Stridor / Drooling / Trismus.  Mucosa moist, tongue/uvula midline, oropharynx clear  Neck: Flat, supple, no lymphadenopathy, trachea midline, no masses  Lymphatic: No lymphadenopathy  Resp: breathing easily, no stridor  CV: no peripheral edema/cyanosis  GI: nondistended   Peripheral vascular: no JVD or edema  Neuro: facial nerve intact, no facial droop      Diagnostic Nasal Endoscopy: (Scope #2 used)    Fiberoptic Indirect laryngoscopy:  (Scope #2 used)    IMAGING/ADDITIONAL STUDIES:  CC: foreign body sensation in throat     HPI: 63 year old Male, with history of DMII, CAD (4 stents),prior MI, and ESRD on dialysis, presenting for foreign body sensation after swallowing his denture. Xray at ED prelim didn't visualize any radiopaque foreign body. ENT consulted for further evaluation. Per pt, 2 hours before presentation, he was taking his evening medications, he unknowingly pushed and dislodged his bottom front denture and swallowed it. Pt tried gagging himself or force coughing, neither was successful. Pt endorses pain when swallowing. However, able to tolerate  secretions. Denies hemoptysis, difficulty breathing. Also denies F/C/N/V, recent URI, rhinorrhea, dysphonia, or changes in voice.    PAST MEDICAL & SURGICAL HISTORY:  Diabetes Mellitus Type II, Uncontrolled    Dyslipidemia    s/p Angioplasty with Stent    HTN (hypertension)    Gout    Diabetic retinopathy    GERD (gastroesophageal reflux disease)    Nephrolithiasis    Vitamin D deficiency    Hepatitis    CKD (chronic kidney disease)    Ischemic cardiomyopathy    ASHD (arteriosclerotic heart disease)    Pulmonary hypertension    Myocardial infarction    CAD (coronary artery disease)    BPH (benign prostatic hyperplasia)    Retinal Hemorrhage    S/P coronary artery stent placement  2010 TRICIA to Diag ; 11/18/2010  LAD; 2/4/11 ATOM liberte Diag; 5/15/2017  TRICIA to 1st diag; 6/7/17 PCI with laser and high pressure balloon    History of eye surgery  left eye    H/O lithotripsy    Diabetes with retinopathy  S/P PPV/PRP/GAS  OD 2/22/17 for viterous hemorrhage    CHF with cardiomyopathy  Insertion of Cardiomems monitor    Stented coronary artery    Allergies    shellfish (Unknown)  No Known Drug Allergies    Intolerances    MEDICATIONS  (STANDING):    MEDICATIONS  (PRN):      Social History: denies history of substance use    Family history: no pertinent family history     ROS:   ENT: all negative except as noted in HPI   CV: denies palpitations  Pulm: denies SOB, cough, hemoptysis  GI: denies change in appetite, indigestion, n/v  : denies pertinent urinary symptoms, urgency  Neuro: denies numbness/tingling, loss of sensation  Psych: denies anxiety  MS: denies muscle weakness, instability  Heme: denies easy bruising or bleeding  Endo: denies heat/cold intolerance, excessive sweating  Vascular: denies LE edema    Vital Signs Last 24 Hrs  T(C): 36.7 (23 Oct 2023 20:53), Max: 36.7 (23 Oct 2023 20:53)  T(F): 98.1 (23 Oct 2023 20:53), Max: 98.1 (23 Oct 2023 20:53)  HR: 82 (23 Oct 2023 20:53) (82 - 82)  BP: 163/62 (23 Oct 2023 20:53) (163/62 - 163/62)  BP(mean): --  RR: 18 (23 Oct 2023 20:53) (18 - 18)  SpO2: 98% (23 Oct 2023 20:53) (98% - 98%)    PHYSICAL EXAM:  Gen: NAD  Skin: No rashes, bruises, or lesions  Head: Normocephalic, Atraumatic  Face: no edema, erythema, or fluctuance. Parotid glands soft without mass  Eyes: no scleral injection  Nose: Nares bilaterally patent, no discharge  Mouth: Class IV Mallampati score. No Stridor / Drooling / Trismus.  Mucosa moist, tongue/uvula midline, oropharynx clear  Neck: Flat, supple, no lymphadenopathy, trachea midline, no masses  Lymphatic: No lymphadenopathy  Resp: breathing easily, no stridor  CV: no peripheral edema/cyanosis  GI: nondistended   Peripheral vascular: no JVD or edema  Neuro: facial nerve intact, no facial droop    IMAGING/ADDITIONAL STUDIES:    XR C SPINE AP LAT ONLY 2-3V ORDERED BY: PEÑA SANTANA    PROCEDURE DATE: 10/23/2023  ******PRELIMINARY REPORT******    INTERPRETATION: CLINICAL INFORMATION: Swallowed dentures, evaluate for foreign body    TECHNIQUE: frontal and lateral views of the cervical spine.    COMPARISON: CT cervical spine 12/12/2022    FINDINGS:  No compression fractures, spondylolistheses, or spondylolysis defects.  Disk space heights preserved and facet alignment maintained.  No radiopaque foreign body is visualized.    IMPRESSION:  No compression fractures, spondylolistheses, or spondylolysis defects.  No radiopaque foreign body is visualized    ******PRELIMINARY REPORT******      Fiberoptic Indirect Laryngoscopy (Ambu scope used):    Reason for Laryngoscopy: foreign body sensation in throat     Patient was unable to cooperate with mirror.  elongated pink foreign body visualized at esophageal inlet extending to left pyriform sinus. mild pooling of secretions. No sight of missing tooth. Nasopharynx, oropharynx, and hypopharynx no bleeding. Tongue base, posterior pharyngeal wall, vallecula, epiglottis, and subglottis appear normal. No erythema, edema, masses or lesions. Airway patent. No glottic/supraglottic edema. True vocal cords, arytenoids, vestibular folds, ventricles, and aryepiglottic folds appear normal bilaterally. Vocal cords mobile with good contact b/l.

## 2023-10-23 NOTE — ED PROVIDER NOTE - CLINICAL SUMMARY MEDICAL DECISION MAKING FREE TEXT BOX
63-year-old male PMH CAD (4 stents), MI, ESRD on dialysis, diabetes presenting for swallowed retained foreign body  Patient presenting for swallowed foreign body without endorsing any other pathologic symptoms will receive Neck, Chest, and abd xray to visualize FB and to ascertain best course of treatment.

## 2023-10-23 NOTE — CONSULT NOTE ADULT - PROBLEM SELECTOR RECOMMENDATION 9
- please transfer pt to [facility]  - accepting ENT attending Dr Jonathan Sauceda   - Please keep pt NPO.   - aspiration precaution.

## 2023-10-23 NOTE — ED PROVIDER NOTE - OBJECTIVE STATEMENT
63-year-old male PMH CAD (4 stents), MI, ESRD on dialysis, diabetes senting for swallowed retained foreign body  Patient well taking his nightly medications accidentally swallowed his denture which included a retainer.  Patient was unable to cough up retainer or seated when he checked his oral cavity.  Patient endorses coughing denies productive cough bloody sputum shortness of breath chest pain abdominal pain.  Patient states he feels the retainer in his throat and has discomfort when he swallows.  Patient denies fevers chills dysuria nausea vomiting diarrhea 63-year-old male PMH CAD (4 stents), MI, ESRD on dialysis, diabetes presenting for swallowed retained foreign body  Patient well taking his nightly medications accidentally swallowed his denture which included a retainer.  Patient was unable to visualize retainer or when he checked his oral cavity.  Patient endorses coughing denies productive cough bloody sputum shortness of breath chest pain abdominal pain.  Patient states he feels the retainer in his throat and has discomfort when he swallows.  Patient denies fevers chills dysuria nausea vomiting diarrhea

## 2023-10-23 NOTE — ED PROVIDER NOTE - ATTENDING CONTRIBUTION TO CARE
Patient is a 63-year-old male with a history of hypertension,  hyperlipidemia,  GERD, pulmonary hypertension, BPH, CAD, type 2 diabetes, ESRD on hemodialysis M/W/F, last HD today, here for evaluation of accidental swallowing of his retainer.  He states he accidentally swallowed his retainer which is about 2 inches long and feels it lodged in his throat.  He states he is having difficulty swallowing.  Denies any difficulty breathing.  He reports he has been coughing and noted bloody sputum.  Denies shortness of breath or chest pain.      VS noted  Gen. no acute distress, Non toxic   HEENT: EOMI, mmm, no stridor  Lungs: CTAB/L no C/ W /R   CVS: RRR   Abd; Soft non tender, non distended   Ext: no edema  Skin: no rash  Neuro AAOx3 non focal clear speech  a/p:  foreign body in throat–plan for x-rays to visualize if it is lodged in his throat.  Patient is maintaining his airway at this time.  Plan for ENT consult.    See progress notes for updates.   - Tayo SPEARS

## 2023-10-23 NOTE — ED PROVIDER NOTE - PROGRESS NOTE DETAILS
Discussed with ENT, will come to scope patient. XRAY imaging negative for FB. Patient states he feels it is in his throat and it is shifting. ENT paged. ENT requested pt to be transferred to [facility] for surgical removal of FB. Transfer center was called and transfer request was placed and accepted ENT was able to visualize foreign body lodged near esophagus above vocal cords.  They discussed case with Dr. Sauceda who recommended transfer to [facility] as proper equipment is not available at [facility] for removal.  Patient will have to go to the OR.  Preop labs ordered.  Patient is aware and consents to plan for transfer.

## 2023-10-23 NOTE — CONSULT NOTE ADULT - ASSESSMENT
63 year old Male, with history of DMII, CAD (4 stents),prior MI, and ESRD on dialysis, presenting for foreign body sensation and odynophagia after swallowing his button front tooth denture. Xray at ED prelim didn't visualize any radiopaque foreign body. During evaluation of oral cavity, no pooling of secretions. Enlarged tongue with class IV Mallampati score. On indirect laryngoscopy, mild pooling of secretions, but appeared to have an elongated pink foreign body on top of esophageal inlet extending to left pyriform sinus. No sight of missing tooth. However, patient airway. pt is currently endorsing pain when swallowing secretions. But no difficulty breathing.  63 year old Male, with history of DMII, CAD (4 stents),prior MI, and ESRD on dialysis, presenting for foreign body sensation and odynophagia after unknowingly pushed, disloged and swallowed his bottom front tooth denture, while taking evening medications.  Xray at ED prelim didn't visualize any radiopaque foreign body. During evaluation of oral cavity, no pooling of secretions. Enlarged tongue with class IV Mallampati score. On indirect laryngoscopy, mild pooling of secretions, but appeared to have an elongated pink foreign body on top of esophageal inlet extending to left pyriform sinus. No sight of missing tooth. However, patient airway. pt is currently endorsing pain when swallowing secretions. But no difficulty breathing.  63 year old Male, with history of DMII, CAD (4 stents),prior MI, and ESRD on dialysis, presenting for foreign body sensation and odynophagia after unknowingly pushed, disloged and swallowed his bottom front tooth denture, while taking evening medications.  Xray at ED prelim didn't visualize any radiopaque foreign body. During evaluation of oral cavity, no pooling of secretions. Enlarged tongue with class IV Mallampati score. On indirect laryngoscopy, mild pooling of secretions, but appeared to have an elongated pink foreign body on top of esophageal inlet extending to left pyriform sinus. No sight of missing tooth. Patent airway. pt is currently endorsing pain when swallowing secretions. But no difficulty breathing.

## 2023-10-23 NOTE — ED PROVIDER NOTE - PHYSICAL EXAMINATION
GENERAL: NAD, lying in bed comfortably  HEAD:  Atraumatic, normocephalic  EYES: EOMI, PERRLA, conjunctiva and sclera clear  NECK: Supple, trachea midline, no JVD  Mouth: unable to visualize foreign body  HEART: Regular rate and rhythm, no murmurs, rubs, or gallops  LUNGS: Unlabored respirations.  Clear to auscultation bilaterally, no crackles, wheezing, or rhonchi  ABDOMEN: Soft, nontender, nondistended, +BS  EXTREMITIES: 2+ peripheral pulses bilaterally. No clubbing, cyanosis, or edema  NERVOUS SYSTEM:  A&Ox3, moving all extremities, no focal deficits   SKIN: No rashes or lesions

## 2023-10-24 ENCOUNTER — TRANSCRIPTION ENCOUNTER (OUTPATIENT)
Age: 63
End: 2023-10-24

## 2023-10-24 ENCOUNTER — INPATIENT (INPATIENT)
Facility: HOSPITAL | Age: 63
LOS: 0 days | Discharge: HOME | End: 2023-10-24
Attending: HOSPITALIST | Admitting: HOSPITALIST
Payer: MEDICARE

## 2023-10-24 VITALS
TEMPERATURE: 98 F | OXYGEN SATURATION: 100 % | HEART RATE: 82 BPM | DIASTOLIC BLOOD PRESSURE: 77 MMHG | SYSTOLIC BLOOD PRESSURE: 137 MMHG | RESPIRATION RATE: 20 BRPM | HEIGHT: 67 IN

## 2023-10-24 VITALS
SYSTOLIC BLOOD PRESSURE: 156 MMHG | DIASTOLIC BLOOD PRESSURE: 71 MMHG | OXYGEN SATURATION: 100 % | TEMPERATURE: 98 F | RESPIRATION RATE: 16 BRPM | HEART RATE: 80 BPM

## 2023-10-24 DIAGNOSIS — Z98.89 OTHER SPECIFIED POSTPROCEDURAL STATES: Chronic | ICD-10-CM

## 2023-10-24 DIAGNOSIS — Z29.9 ENCOUNTER FOR PROPHYLACTIC MEASURES, UNSPECIFIED: ICD-10-CM

## 2023-10-24 DIAGNOSIS — T17.208A UNSPECIFIED FOREIGN BODY IN PHARYNX CAUSING OTHER INJURY, INITIAL ENCOUNTER: ICD-10-CM

## 2023-10-24 DIAGNOSIS — N18.6 END STAGE RENAL DISEASE: ICD-10-CM

## 2023-10-24 DIAGNOSIS — I50.9 HEART FAILURE, UNSPECIFIED: Chronic | ICD-10-CM

## 2023-10-24 DIAGNOSIS — E11.319 TYPE 2 DIABETES MELLITUS WITH UNSPECIFIED DIABETIC RETINOPATHY WITHOUT MACULAR EDEMA: Chronic | ICD-10-CM

## 2023-10-24 DIAGNOSIS — E11.9 TYPE 2 DIABETES MELLITUS WITHOUT COMPLICATIONS: ICD-10-CM

## 2023-10-24 DIAGNOSIS — I10 ESSENTIAL (PRIMARY) HYPERTENSION: ICD-10-CM

## 2023-10-24 DIAGNOSIS — Z95.5 PRESENCE OF CORONARY ANGIOPLASTY IMPLANT AND GRAFT: Chronic | ICD-10-CM

## 2023-10-24 DIAGNOSIS — I25.10 ATHEROSCLEROTIC HEART DISEASE OF NATIVE CORONARY ARTERY WITHOUT ANGINA PECTORIS: ICD-10-CM

## 2023-10-24 DIAGNOSIS — T17.908A UNSPECIFIED FOREIGN BODY IN RESPIRATORY TRACT, PART UNSPECIFIED CAUSING OTHER INJURY, INITIAL ENCOUNTER: ICD-10-CM

## 2023-10-24 LAB
ALBUMIN SERPL ELPH-MCNC: 4.1 G/DL — SIGNIFICANT CHANGE UP (ref 3.3–5)
ALBUMIN SERPL ELPH-MCNC: 4.1 G/DL — SIGNIFICANT CHANGE UP (ref 3.3–5)
ALP SERPL-CCNC: 128 U/L — HIGH (ref 40–120)
ALP SERPL-CCNC: 128 U/L — HIGH (ref 40–120)
ALT FLD-CCNC: 15 U/L — SIGNIFICANT CHANGE UP (ref 10–45)
ALT FLD-CCNC: 15 U/L — SIGNIFICANT CHANGE UP (ref 10–45)
ANION GAP SERPL CALC-SCNC: 15 MMOL/L — SIGNIFICANT CHANGE UP (ref 5–17)
ANION GAP SERPL CALC-SCNC: 15 MMOL/L — SIGNIFICANT CHANGE UP (ref 5–17)
APTT BLD: 31.2 SEC — SIGNIFICANT CHANGE UP (ref 24.5–35.6)
APTT BLD: 31.2 SEC — SIGNIFICANT CHANGE UP (ref 24.5–35.6)
AST SERPL-CCNC: 14 U/L — SIGNIFICANT CHANGE UP (ref 10–40)
AST SERPL-CCNC: 14 U/L — SIGNIFICANT CHANGE UP (ref 10–40)
BASOPHILS # BLD AUTO: 0.05 K/UL — SIGNIFICANT CHANGE UP (ref 0–0.2)
BASOPHILS # BLD AUTO: 0.05 K/UL — SIGNIFICANT CHANGE UP (ref 0–0.2)
BASOPHILS NFR BLD AUTO: 0.8 % — SIGNIFICANT CHANGE UP (ref 0–2)
BASOPHILS NFR BLD AUTO: 0.8 % — SIGNIFICANT CHANGE UP (ref 0–2)
BILIRUB SERPL-MCNC: 0.4 MG/DL — SIGNIFICANT CHANGE UP (ref 0.2–1.2)
BILIRUB SERPL-MCNC: 0.4 MG/DL — SIGNIFICANT CHANGE UP (ref 0.2–1.2)
BLD GP AB SCN SERPL QL: NEGATIVE — SIGNIFICANT CHANGE UP
BUN SERPL-MCNC: 42 MG/DL — HIGH (ref 7–23)
BUN SERPL-MCNC: 42 MG/DL — HIGH (ref 7–23)
CALCIUM SERPL-MCNC: 8.6 MG/DL — SIGNIFICANT CHANGE UP (ref 8.4–10.5)
CALCIUM SERPL-MCNC: 8.6 MG/DL — SIGNIFICANT CHANGE UP (ref 8.4–10.5)
CHLORIDE SERPL-SCNC: 94 MMOL/L — LOW (ref 96–108)
CHLORIDE SERPL-SCNC: 94 MMOL/L — LOW (ref 96–108)
CO2 SERPL-SCNC: 27 MMOL/L — SIGNIFICANT CHANGE UP (ref 22–31)
CO2 SERPL-SCNC: 27 MMOL/L — SIGNIFICANT CHANGE UP (ref 22–31)
CREAT SERPL-MCNC: 4.79 MG/DL — HIGH (ref 0.5–1.3)
CREAT SERPL-MCNC: 4.79 MG/DL — HIGH (ref 0.5–1.3)
EGFR: 13 ML/MIN/1.73M2 — LOW
EGFR: 13 ML/MIN/1.73M2 — LOW
EOSINOPHIL # BLD AUTO: 0.45 K/UL — SIGNIFICANT CHANGE UP (ref 0–0.5)
EOSINOPHIL # BLD AUTO: 0.45 K/UL — SIGNIFICANT CHANGE UP (ref 0–0.5)
EOSINOPHIL NFR BLD AUTO: 7.2 % — HIGH (ref 0–6)
EOSINOPHIL NFR BLD AUTO: 7.2 % — HIGH (ref 0–6)
GLUCOSE BLDC GLUCOMTR-MCNC: 250 MG/DL — HIGH (ref 70–99)
GLUCOSE BLDC GLUCOMTR-MCNC: 250 MG/DL — HIGH (ref 70–99)
GLUCOSE SERPL-MCNC: 336 MG/DL — HIGH (ref 70–99)
GLUCOSE SERPL-MCNC: 336 MG/DL — HIGH (ref 70–99)
HCT VFR BLD CALC: 30.9 % — LOW (ref 39–50)
HCT VFR BLD CALC: 30.9 % — LOW (ref 39–50)
HGB BLD-MCNC: 10.4 G/DL — LOW (ref 13–17)
HGB BLD-MCNC: 10.4 G/DL — LOW (ref 13–17)
IMM GRANULOCYTES NFR BLD AUTO: 0.6 % — SIGNIFICANT CHANGE UP (ref 0–0.9)
IMM GRANULOCYTES NFR BLD AUTO: 0.6 % — SIGNIFICANT CHANGE UP (ref 0–0.9)
INR BLD: 0.93 RATIO — SIGNIFICANT CHANGE UP (ref 0.85–1.18)
INR BLD: 0.93 RATIO — SIGNIFICANT CHANGE UP (ref 0.85–1.18)
LYMPHOCYTES # BLD AUTO: 1.78 K/UL — SIGNIFICANT CHANGE UP (ref 1–3.3)
LYMPHOCYTES # BLD AUTO: 1.78 K/UL — SIGNIFICANT CHANGE UP (ref 1–3.3)
LYMPHOCYTES # BLD AUTO: 28.4 % — SIGNIFICANT CHANGE UP (ref 13–44)
LYMPHOCYTES # BLD AUTO: 28.4 % — SIGNIFICANT CHANGE UP (ref 13–44)
MAGNESIUM SERPL-MCNC: 2.4 MG/DL — SIGNIFICANT CHANGE UP (ref 1.6–2.6)
MAGNESIUM SERPL-MCNC: 2.4 MG/DL — SIGNIFICANT CHANGE UP (ref 1.6–2.6)
MCHC RBC-ENTMCNC: 32.5 PG — SIGNIFICANT CHANGE UP (ref 27–34)
MCHC RBC-ENTMCNC: 32.5 PG — SIGNIFICANT CHANGE UP (ref 27–34)
MCHC RBC-ENTMCNC: 33.7 GM/DL — SIGNIFICANT CHANGE UP (ref 32–36)
MCHC RBC-ENTMCNC: 33.7 GM/DL — SIGNIFICANT CHANGE UP (ref 32–36)
MCV RBC AUTO: 96.6 FL — SIGNIFICANT CHANGE UP (ref 80–100)
MCV RBC AUTO: 96.6 FL — SIGNIFICANT CHANGE UP (ref 80–100)
MONOCYTES # BLD AUTO: 0.75 K/UL — SIGNIFICANT CHANGE UP (ref 0–0.9)
MONOCYTES # BLD AUTO: 0.75 K/UL — SIGNIFICANT CHANGE UP (ref 0–0.9)
MONOCYTES NFR BLD AUTO: 12 % — SIGNIFICANT CHANGE UP (ref 2–14)
MONOCYTES NFR BLD AUTO: 12 % — SIGNIFICANT CHANGE UP (ref 2–14)
NEUTROPHILS # BLD AUTO: 3.2 K/UL — SIGNIFICANT CHANGE UP (ref 1.8–7.4)
NEUTROPHILS # BLD AUTO: 3.2 K/UL — SIGNIFICANT CHANGE UP (ref 1.8–7.4)
NEUTROPHILS NFR BLD AUTO: 51 % — SIGNIFICANT CHANGE UP (ref 43–77)
NEUTROPHILS NFR BLD AUTO: 51 % — SIGNIFICANT CHANGE UP (ref 43–77)
NRBC # BLD: 0 /100 WBCS — SIGNIFICANT CHANGE UP (ref 0–0)
NRBC # BLD: 0 /100 WBCS — SIGNIFICANT CHANGE UP (ref 0–0)
PLATELET # BLD AUTO: 132 K/UL — LOW (ref 150–400)
PLATELET # BLD AUTO: 132 K/UL — LOW (ref 150–400)
POTASSIUM SERPL-MCNC: 4.1 MMOL/L — SIGNIFICANT CHANGE UP (ref 3.5–5.3)
POTASSIUM SERPL-MCNC: 4.1 MMOL/L — SIGNIFICANT CHANGE UP (ref 3.5–5.3)
POTASSIUM SERPL-SCNC: 4.1 MMOL/L — SIGNIFICANT CHANGE UP (ref 3.5–5.3)
POTASSIUM SERPL-SCNC: 4.1 MMOL/L — SIGNIFICANT CHANGE UP (ref 3.5–5.3)
PROT SERPL-MCNC: 6.5 G/DL — SIGNIFICANT CHANGE UP (ref 6–8.3)
PROT SERPL-MCNC: 6.5 G/DL — SIGNIFICANT CHANGE UP (ref 6–8.3)
PROTHROM AB SERPL-ACNC: 10.2 SEC — SIGNIFICANT CHANGE UP (ref 9.5–13)
PROTHROM AB SERPL-ACNC: 10.2 SEC — SIGNIFICANT CHANGE UP (ref 9.5–13)
RBC # BLD: 3.2 M/UL — LOW (ref 4.2–5.8)
RBC # BLD: 3.2 M/UL — LOW (ref 4.2–5.8)
RBC # FLD: 13.2 % — SIGNIFICANT CHANGE UP (ref 10.3–14.5)
RBC # FLD: 13.2 % — SIGNIFICANT CHANGE UP (ref 10.3–14.5)
RH IG SCN BLD-IMP: POSITIVE — SIGNIFICANT CHANGE UP
SODIUM SERPL-SCNC: 136 MMOL/L — SIGNIFICANT CHANGE UP (ref 135–145)
SODIUM SERPL-SCNC: 136 MMOL/L — SIGNIFICANT CHANGE UP (ref 135–145)
WBC # BLD: 6.27 K/UL — SIGNIFICANT CHANGE UP (ref 3.8–10.5)
WBC # BLD: 6.27 K/UL — SIGNIFICANT CHANGE UP (ref 3.8–10.5)
WBC # FLD AUTO: 6.27 K/UL — SIGNIFICANT CHANGE UP (ref 3.8–10.5)
WBC # FLD AUTO: 6.27 K/UL — SIGNIFICANT CHANGE UP (ref 3.8–10.5)

## 2023-10-24 PROCEDURE — 86900 BLOOD TYPING SEROLOGIC ABO: CPT

## 2023-10-24 PROCEDURE — 85730 THROMBOPLASTIN TIME PARTIAL: CPT

## 2023-10-24 PROCEDURE — 99223 1ST HOSP IP/OBS HIGH 75: CPT

## 2023-10-24 PROCEDURE — 86901 BLOOD TYPING SEROLOGIC RH(D): CPT

## 2023-10-24 PROCEDURE — 86850 RBC ANTIBODY SCREEN: CPT

## 2023-10-24 PROCEDURE — 93010 ELECTROCARDIOGRAM REPORT: CPT

## 2023-10-24 PROCEDURE — 12345: CPT | Mod: NC

## 2023-10-24 PROCEDURE — 71046 X-RAY EXAM CHEST 2 VIEWS: CPT

## 2023-10-24 PROCEDURE — 85610 PROTHROMBIN TIME: CPT

## 2023-10-24 PROCEDURE — 80053 COMPREHEN METABOLIC PANEL: CPT

## 2023-10-24 PROCEDURE — 85025 COMPLETE CBC W/AUTO DIFF WBC: CPT

## 2023-10-24 PROCEDURE — 72040 X-RAY EXAM NECK SPINE 2-3 VW: CPT

## 2023-10-24 PROCEDURE — 74019 RADEX ABDOMEN 2 VIEWS: CPT

## 2023-10-24 PROCEDURE — 99285 EMERGENCY DEPT VISIT HI MDM: CPT | Mod: 25

## 2023-10-24 PROCEDURE — 83735 ASSAY OF MAGNESIUM: CPT

## 2023-10-24 PROCEDURE — 99285 EMERGENCY DEPT VISIT HI MDM: CPT

## 2023-10-24 PROCEDURE — 36415 COLL VENOUS BLD VENIPUNCTURE: CPT

## 2023-10-24 RX ORDER — GABAPENTIN 400 MG/1
300 CAPSULE ORAL
Refills: 0 | Status: DISCONTINUED | OUTPATIENT
Start: 2023-10-24 | End: 2023-10-24

## 2023-10-24 RX ORDER — ASPIRIN/CALCIUM CARB/MAGNESIUM 324 MG
81 TABLET ORAL DAILY
Refills: 0 | Status: DISCONTINUED | OUTPATIENT
Start: 2023-10-24 | End: 2023-10-24

## 2023-10-24 RX ORDER — ONDANSETRON 8 MG/1
4 TABLET, FILM COATED ORAL EVERY 8 HOURS
Refills: 0 | Status: DISCONTINUED | OUTPATIENT
Start: 2023-10-24 | End: 2023-10-24

## 2023-10-24 RX ORDER — CLOPIDOGREL BISULFATE 75 MG/1
1 TABLET, FILM COATED ORAL
Refills: 0 | DISCHARGE

## 2023-10-24 RX ORDER — ALLOPURINOL 300 MG
1 TABLET ORAL
Qty: 0 | Refills: 0 | DISCHARGE

## 2023-10-24 RX ORDER — CARVEDILOL PHOSPHATE 80 MG/1
12.5 CAPSULE, EXTENDED RELEASE ORAL
Refills: 0 | Status: DISCONTINUED | OUTPATIENT
Start: 2023-10-24 | End: 2023-10-24

## 2023-10-24 RX ORDER — ASPIRIN/CALCIUM CARB/MAGNESIUM 324 MG
1 TABLET ORAL
Qty: 0 | Refills: 0 | DISCHARGE

## 2023-10-24 RX ORDER — FOLIC ACID 0.8 MG
1 TABLET ORAL DAILY
Refills: 0 | Status: DISCONTINUED | OUTPATIENT
Start: 2023-10-24 | End: 2023-10-24

## 2023-10-24 RX ORDER — TAMSULOSIN HYDROCHLORIDE 0.4 MG/1
1 CAPSULE ORAL
Qty: 0 | Refills: 0 | DISCHARGE

## 2023-10-24 RX ORDER — BENZOCAINE 10 %
1 GEL (GRAM) MUCOUS MEMBRANE ONCE
Refills: 0 | Status: COMPLETED | OUTPATIENT
Start: 2023-10-24 | End: 2023-10-24

## 2023-10-24 RX ORDER — SERTRALINE 25 MG/1
25 TABLET, FILM COATED ORAL DAILY
Refills: 0 | Status: DISCONTINUED | OUTPATIENT
Start: 2023-10-24 | End: 2023-10-24

## 2023-10-24 RX ORDER — ATORVASTATIN CALCIUM 80 MG/1
80 TABLET, FILM COATED ORAL AT BEDTIME
Refills: 0 | Status: DISCONTINUED | OUTPATIENT
Start: 2023-10-24 | End: 2023-10-24

## 2023-10-24 RX ORDER — DEXTROSE 50 % IN WATER 50 %
12.5 SYRINGE (ML) INTRAVENOUS ONCE
Refills: 0 | Status: DISCONTINUED | OUTPATIENT
Start: 2023-10-24 | End: 2023-10-24

## 2023-10-24 RX ORDER — SODIUM CHLORIDE 9 MG/ML
1000 INJECTION, SOLUTION INTRAVENOUS
Refills: 0 | Status: DISCONTINUED | OUTPATIENT
Start: 2023-10-24 | End: 2023-10-24

## 2023-10-24 RX ORDER — ASCORBIC ACID 60 MG
500 TABLET,CHEWABLE ORAL DAILY
Refills: 0 | Status: DISCONTINUED | OUTPATIENT
Start: 2023-10-24 | End: 2023-10-24

## 2023-10-24 RX ORDER — INSULIN LISPRO 100/ML
VIAL (ML) SUBCUTANEOUS
Refills: 0 | Status: DISCONTINUED | OUTPATIENT
Start: 2023-10-24 | End: 2023-10-24

## 2023-10-24 RX ORDER — COLLAGENASE CLOSTRIDIUM HIST. 250 UNIT/G
1 OINTMENT (GRAM) TOPICAL
Qty: 0 | Refills: 0 | DISCHARGE

## 2023-10-24 RX ORDER — LANOLIN ALCOHOL/MO/W.PET/CERES
3 CREAM (GRAM) TOPICAL AT BEDTIME
Refills: 0 | Status: DISCONTINUED | OUTPATIENT
Start: 2023-10-24 | End: 2023-10-24

## 2023-10-24 RX ORDER — DEXTROSE 50 % IN WATER 50 %
25 SYRINGE (ML) INTRAVENOUS ONCE
Refills: 0 | Status: DISCONTINUED | OUTPATIENT
Start: 2023-10-24 | End: 2023-10-24

## 2023-10-24 RX ORDER — INSULIN LISPRO 100/ML
VIAL (ML) SUBCUTANEOUS AT BEDTIME
Refills: 0 | Status: DISCONTINUED | OUTPATIENT
Start: 2023-10-24 | End: 2023-10-24

## 2023-10-24 RX ORDER — ALLOPURINOL 300 MG
100 TABLET ORAL DAILY
Refills: 0 | Status: DISCONTINUED | OUTPATIENT
Start: 2023-10-24 | End: 2023-10-24

## 2023-10-24 RX ORDER — HEPARIN SODIUM 5000 [USP'U]/ML
5000 INJECTION INTRAVENOUS; SUBCUTANEOUS EVERY 12 HOURS
Refills: 0 | Status: DISCONTINUED | OUTPATIENT
Start: 2023-10-24 | End: 2023-10-24

## 2023-10-24 RX ORDER — ATORVASTATIN CALCIUM 80 MG/1
1 TABLET, FILM COATED ORAL
Qty: 0 | Refills: 0 | DISCHARGE

## 2023-10-24 RX ORDER — INSULIN GLARGINE 100 [IU]/ML
7 INJECTION, SOLUTION SUBCUTANEOUS ONCE
Refills: 0 | Status: COMPLETED | OUTPATIENT
Start: 2023-10-24 | End: 2023-10-24

## 2023-10-24 RX ORDER — SENNA PLUS 8.6 MG/1
2 TABLET ORAL AT BEDTIME
Refills: 0 | Status: DISCONTINUED | OUTPATIENT
Start: 2023-10-24 | End: 2023-10-24

## 2023-10-24 RX ORDER — SEVELAMER CARBONATE 2400 MG/1
1 POWDER, FOR SUSPENSION ORAL
Qty: 0 | Refills: 0 | DISCHARGE

## 2023-10-24 RX ORDER — FAMOTIDINE 10 MG/ML
10 INJECTION INTRAVENOUS DAILY
Refills: 0 | Status: DISCONTINUED | OUTPATIENT
Start: 2023-10-24 | End: 2023-10-24

## 2023-10-24 RX ORDER — INSULIN GLARGINE 100 [IU]/ML
15 INJECTION, SOLUTION SUBCUTANEOUS
Qty: 0 | Refills: 0 | DISCHARGE

## 2023-10-24 RX ORDER — DEXTROSE 50 % IN WATER 50 %
15 SYRINGE (ML) INTRAVENOUS ONCE
Refills: 0 | Status: DISCONTINUED | OUTPATIENT
Start: 2023-10-24 | End: 2023-10-24

## 2023-10-24 RX ORDER — ACETAMINOPHEN 500 MG
650 TABLET ORAL EVERY 6 HOURS
Refills: 0 | Status: DISCONTINUED | OUTPATIENT
Start: 2023-10-24 | End: 2023-10-24

## 2023-10-24 RX ORDER — CARVEDILOL PHOSPHATE 80 MG/1
6.25 CAPSULE, EXTENDED RELEASE ORAL
Refills: 0 | Status: DISCONTINUED | OUTPATIENT
Start: 2023-10-24 | End: 2023-10-24

## 2023-10-24 RX ORDER — LANOLIN ALCOHOL/MO/W.PET/CERES
1 CREAM (GRAM) TOPICAL
Qty: 0 | Refills: 0 | DISCHARGE

## 2023-10-24 RX ORDER — GLUCAGON INJECTION, SOLUTION 0.5 MG/.1ML
1 INJECTION, SOLUTION SUBCUTANEOUS ONCE
Refills: 0 | Status: DISCONTINUED | OUTPATIENT
Start: 2023-10-24 | End: 2023-10-24

## 2023-10-24 RX ORDER — SENNA PLUS 8.6 MG/1
2 TABLET ORAL
Qty: 0 | Refills: 0 | DISCHARGE

## 2023-10-24 RX ORDER — SEVELAMER CARBONATE 2400 MG/1
1600 POWDER, FOR SUSPENSION ORAL THREE TIMES A DAY
Refills: 0 | Status: DISCONTINUED | OUTPATIENT
Start: 2023-10-24 | End: 2023-10-24

## 2023-10-24 RX ORDER — INSULIN GLARGINE 100 [IU]/ML
10 INJECTION, SOLUTION SUBCUTANEOUS AT BEDTIME
Refills: 0 | Status: DISCONTINUED | OUTPATIENT
Start: 2023-10-24 | End: 2023-10-24

## 2023-10-24 RX ORDER — LIDOCAINE HCL 20 MG/ML
4 VIAL (ML) INJECTION ONCE
Refills: 0 | Status: COMPLETED | OUTPATIENT
Start: 2023-10-24 | End: 2023-10-24

## 2023-10-24 RX ADMIN — Medication 1 SPRAY(S): at 02:09

## 2023-10-24 RX ADMIN — INSULIN GLARGINE 7 UNIT(S): 100 INJECTION, SOLUTION SUBCUTANEOUS at 06:53

## 2023-10-24 RX ADMIN — Medication 4 MILLILITER(S): at 02:28

## 2023-10-24 NOTE — PROGRESS NOTE ADULT - PROBLEM SELECTOR PLAN 1
Assessment:  - patient presented after swallowing his dentures accidentally, was not able to be removed at bedside by ENT  -ENT performed laryngoscopy and removed denture and returned to patient at bedside  no need for OR  cleared for DC home per ENT

## 2023-10-24 NOTE — ED PROVIDER NOTE - CLINICAL SUMMARY MEDICAL DECISION MAKING FREE TEXT BOX
63-year-old male past medical history of CAD diabetes end-stage renal with airway foreign body.  Patient's airway is currently patent and he is tolerating secretions.  ENT was consulted and they plan to do a bedside removal.  If unable to do so they will need to admit to take to operating room.  We will continue to monitor airway until procedure is complete

## 2023-10-24 NOTE — ED ADULT NURSE REASSESSMENT NOTE - NS ED NURSE REASSESS COMMENT FT1
Pt presented to ED for foreign body stuck in throat, no respiratory distress noted, denies SOB, breathing even and unlabored, clear lung sounds bilateral. ENT at bedside attempting to extract foreign body. NSR on cardiac monitor, spo2 98% room air. Pt denies any present pain. Will continue to monitor.

## 2023-10-24 NOTE — DISCHARGE NOTE PROVIDER - NSDCMRMEDTOKEN_GEN_ALL_CORE_FT
allopurinol 100 mg oral tablet: 1 tab(s) orally once a day  aspirin 81 mg oral tablet: 1 tab(s) orally once a day  atorvastatin 80 mg oral tablet: 1 tab(s) orally once a day  clopidogrel 75 mg oral tablet: 1 tab(s) orally once a day  Coreg 12.5 mg oral tablet: 1 tab(s) orally 2 times a day evening dose cut in half before dialysis days, take only QHs on HD days, regular dosing on Saturday  famotidine 10 mg oral tablet: 1 tab(s) orally once a day  folic acid 1 mg oral tablet: 1 tab(s) orally once a day  gabapentin 300 mg oral tablet: 1 tab(s) orally 2 times a day  HumaLOG 100 units/mL subcutaneous solution: 10 unit(s) subcutaneous 3 times a day  Senna 8.6 mg oral tablet: 2 tab(s) orally once a day (at bedtime)  sevelamer carbonate 800 mg oral tablet: 2 tab(s) orally 3 times a day  Toujeo SoloStar 300 units/mL subcutaneous solution: 20 unit(s) subcutaneous once a day (at bedtime)  valsartan 40 mg oral tablet: 1 tab(s) orally once a day  Vitamin C 500 mg oral capsule: 1 cap(s) orally once a day  Zoloft 25 mg oral tablet: 1 tab(s) orally once a day

## 2023-10-24 NOTE — CONSULT NOTE ADULT - SUBJECTIVE AND OBJECTIVE BOX
CC: Foreign body in throat    HPI: 63 pmhx DMII, CAD (4 stents),prior MI, and ESRD on dialysis, presenting for foreign body sensation after swallowing his denture.   Around 6PM pt was taking his evening medications, he unknowingly pushed and dislodged his bottom front denture and swallowed it. Pt denied prior events of this happening before Pt tried gagging himself or force coughing, neither was successful. Pt endorses pain when swallowing.     Denies hemoptysis, difficulty breathing. Also denies F/C/N/V, recent URI, rhinorrhea, dysphonia, or changes in voice.          PAST MEDICAL & SURGICAL HISTORY:  Diabetes Mellitus Type II, Uncontrolled      Dyslipidemia      s/p Angioplasty with Stent      HTN (hypertension)      Gout      Diabetic retinopathy      GERD (gastroesophageal reflux disease)      Nephrolithiasis      Vitamin D deficiency      Hepatitis      CKD (chronic kidney disease)      Ischemic cardiomyopathy      ASHD (arteriosclerotic heart disease)      Pulmonary hypertension      Myocardial infarction      CAD (coronary artery disease)      BPH (benign prostatic hyperplasia)      Retinal Hemorrhage      S/P coronary artery stent placement  2010 TRICIA to Diag ; 11/18/2010  LAD; 2/4/11 ATOM liberte Diag; 5/15/2017  TRICIA to 1st diag; 6/7/17 PCI with laser and high pressure balloon      History of eye surgery  left eye      H/O lithotripsy      Diabetes with retinopathy  S/P PPV/PRP/GAS  OD 2/22/17 for viterous hemorrhage      CHF with cardiomyopathy  Insertion of Cardiomems monitor      Stented coronary artery        Allergies    shellfish (Unknown)  No Known Drug Allergies    Intolerances      MEDICATIONS  (STANDING):  benzocaine 20% Spray 1 Spray(s) Topical once  insulin glargine Injectable (LANTUS) 7 Unit(s) SubCutaneous once  lidocaine 4% Injection for Nebulization 4 milliLiter(s) Nebulizer once    MEDICATIONS  (PRN):           Family history: No pertinent family history in first degree relatives       Vital Signs Last 24 Hrs  T(C): 36.7 (24 Oct 2023 01:38), Max: 36.7 (23 Oct 2023 20:53)  T(F): 98 (24 Oct 2023 01:38), Max: 98.1 (23 Oct 2023 20:53)  HR: 82 (24 Oct 2023 01:38) (82 - 82)  BP: 137/77 (24 Oct 2023 01:38) (137/77 - 163/62)  BP(mean): --  RR: 20 (24 Oct 2023 01:38) (18 - 20)  SpO2: 100% (24 Oct 2023 01:38) (98% - 100%)    Parameters below as of 24 Oct 2023 01:38  Patient On (Oxygen Delivery Method): room air                              10.4   6.27  )-----------( 132      ( 23 Oct 2023 23:55 )             30.9    10-23    136  |  94<L>  |  42<H>  ----------------------------<  336<H>  4.1   |  27  |  4.79<H>    Ca    8.6      23 Oct 2023 23:55  Mg     2.4     10-23    TPro  6.5  /  Alb  4.1  /  TBili  0.4  /  DBili  x   /  AST  14  /  ALT  15  /  AlkPhos  128<H>  10-23   PT/INR - ( 24 Oct 2023 00:59 )   PT: 10.2 sec;   INR: 0.93 ratio         PTT - ( 24 Oct 2023 00:59 )  PTT:31.2 sec    PHYSICAL EXAM:  Gen: NAD  Skin: No rashes, bruises, or lesions  Head: Normocephalic, Atraumatic  Face: no edema, erythema, or fluctuance.   Eyes: no scleral injection  Nose: Nares bilaterally patent, no discharge  Mouth: No stridor, no drooling, no trismus.  Mucosa moist, poor mouth visualization - Mallampati score III  Neck: Flat, supple   Lymphatic: No lymphadenopathy  Resp: breathing easily, no stridor  CV: no peripheral edema/cyanosis            Fiberoptic Indirect laryngoscopy:    Flexible laryngoscopy was performed and revealed the following:       -- Base of tongue was symmetric and not enlarged     -- Vallecula was clear    -- Epiglottis, both aryepiglottic folds and both false vocal folds were normal    -- Arytenoids both without edema and erythema     -- True vocal folds were fully mobile and without lesions.     --  Foreign body visualized at esophageal inlet     -- Airway patent

## 2023-10-24 NOTE — H&P ADULT - HISTORY OF PRESENT ILLNESS
This is a 62 y/o M with pmhx of ESRD, CAD with stents, presented to the ED for swallowing dentures. The patient was taking his medications last night and his dentures became loose and accidentally swallowed it. The patient could not cough it out. The patient was at [facility] where ENT had scoped the patient however did not have ENT and head and neck surgery there so he was transferred to [facility] for further evaluation. The patient was evaluated by ENT and they were still unable to remove the foreign object. The patient denies shortness of breath, only sore throat when swallowing. Denies fevers, chest pain or abdominal pain. The patient does not appear in respiratory distress    The patient had dialysis yesterday. His last stent was in 11/2022. ROS otherwise negative.

## 2023-10-24 NOTE — H&P ADULT - PROBLEM SELECTOR PLAN 1
Assessment:  - patient presented after swallowing his dentures accidentally, was not able to be removed at bedside by ENT  - will need to be done in the OR  - ENT following - patient to go to OR as an add-on today  - no signs of respiratory distress at this time, only sore throat with swallowing    Plan:  - will need to call cards for clearance  - nephrology to be called for clearance  - monitor respiratory status, continuous pulse ox  - NPO until procedure Assessment:  - patient presented after swallowing his dentures accidentally, was not able to be removed at bedside by ENT  - will need to be done in the OR  - ENT following - patient to go to OR as an add-on today  - no signs of respiratory distress at this time, only sore throat with swallowing    Plan:  - will need to call cards for clearance - I already notified them about this patient, please follow up  - nephrology to be called for clearance  - monitor respiratory status, continuous pulse ox  - NPO until procedure Assessment:  - patient presented after swallowing his dentures accidentally, was not able to be removed at bedside by ENT  - will need to be done in the OR  - ENT following - patient to go to OR as an add-on today  - no signs of respiratory distress at this time, only sore throat with swallowing    Plan:  - will need to call cards for clearance - I already notified them about this patient, please follow up (Dr. Leonard does not see patients in [facility], requesting house cards, house cards to decide if they can see)  - nephrology to be called for clearance - Dr. Baker to see patient today  - monitor respiratory status, continuous pulse ox  - NPO until procedure Assessment:  - patient presented after swallowing his dentures accidentally, was not able to be removed at bedside by ENT  - will need to be done in the OR  - ENT following - patient to go to OR as an add-on today  - no signs of respiratory distress at this time, only sore throat with swallowing    Plan:  - will need to call cards for clearance - I already notified them about this patient, please follow up (Dr. Leonard does not see patients in [facility], requesting house cards, house cards to decide if they can see)  - nephrology to be called for clearance - Dr. Baker ([facility]) to see patient today  - monitor respiratory status, continuous pulse ox  - NPO until procedure

## 2023-10-24 NOTE — ED ADULT TRIAGE NOTE - MEANS OF ARRIVAL
Department of Psychiatry  History and Physical - Adult     CHIEF COMPLAINT:  \"I don't want to take the Risperdal, It messes up my stomach. \"    Patient was seen after discussing with the treatment team and reviewing the chart    CIRCUMSTANCES OF ADMISSION:   Patient presented to [facility] ED interested in admission to the [facility] and to get back on his medications. Patient is reporting auditory hallucinations, he is preoccupied, and tangential, paranoid. Patient is pink slipped for psychosis. HISTORY OF PRESENT ILLNESS:      The patient is a 44 y.o. male with significant past history of paranoid schizophrenia, who presented to  [facility] ED interested in admission to the [facility] and to get back on his medications. Patient is reporting auditory hallucinations, he is preoccupied, and tangential, paranoid. Patient is pink slipped for psychosis. Upon assessment today the patient is linear, he is future focused and agrees to speak with me and NP student. He states that he was not looking for inpatient admission, only wanted back on his medications and to stay at [place] [id number]. He states that he is not having any perceptual disturbances, states that he knows he needs on Depakote and that it works well for him. He tells me he works a job and does not want to be heavily medicated. He is very future focused, he is agreeable to medications. Tells me that Depakote works the best. He is asking for tramadol, and I educate the patient that I am not able to prescribe this medication for him. He states that overall he eats well, sleeping well, denies SI/HI. He does endorse some paranoia, however he is not acting out on it. He states he does not like confrontation,and avoids it. He just wants to get lined up with housing and work. Past psychiatric history:  Endorses history of schizophrenia. Denies any in pateint hospitalization. States that he is not MH service connected. But is agreeable to  MH services.  He is agreeable to medications preferably depakote, stating that works well for him. He denies SI/HI he states that he has never attempted suicide and is not suicidal Denies abuse or trauma. Family psychiatric history:  Denies      Substance abuse history:  Denies. UDS in ED positive for cannabinoid. Legal history:  Denies    Personal family social history:  Raised in [place], states he has a GED, has no children, never been . He states that he was living in [place] and returned to [facility] ([place] States that he is employed working at a car [employer] and was living in a apartment shared with a room mate, he is now seeking housing. Past Medical History:        Diagnosis Date    Schizophrenia [facility])        Medications Prior to Admission:   No medications prior to admission. Past Surgical History:    History reviewed. No pertinent surgical history. Allergies:   Patient has no known allergies. Family History  History reviewed. No pertinent family history. EXAMINATION:    REVIEW OF SYSTEMS:    ROS:  [x] All negative/unchanged except if checked.  Explain positive(checked items) below:  [] Constitutional  [] Eyes  [] Ear/Nose/Mouth/Throat  [] Respiratory  [] CV  [] GI  []   [] Musculoskeletal  [] Skin/Breast  [] Neurological  [] Endocrine  [] Heme/Lymph  [] Allergic/Immunologic    Explanation:     Vitals:  /79   Pulse 97   Temp 98.6 °F (37 °C) (Oral)   Resp 16   Ht 5' 8\" (1.727 m)   Wt 190 lb (86.2 kg)   SpO2 100%   BMI 28.89 kg/m²      Physical Examination:   Head: x  Atraumatic: x normocephalic  Skin and Mucosa        Moist x  Dry   Pale  x Normal   Neck:  Thyroid  Palpable   x  Not palpable   venus distention   adenopathy   Chest: x Clear   Rhonchi     Wheezing   CV:  xS1   xS2    xNo murmer   Abdomen:  x  Soft    Tender    Viceromegaly   Extremities:  x No Edema     Edema     Cranial Nerves Examination:   CN II:   xPupils are reactive to light  Pupils are non reactive to light  CN III, IV, VI:  xNo eye deviation    No diplopia or ptosis   CN V:    xFacial Sensation is intact     Facial Sensation is not intact   CN IIIV:   x Hearing is normal to rubbing fingers   CN IX, X:     xNormal gag reflex and phonation   CN XI:   xShoulder shrug and neck rotation is normal  CNXII:    xTongue is midline no deviation or atrophy    Mental Status Examination:    Mental status examination reveals a 28-year-old -American male with average hygiene average grooming is forthcoming and cooperative for assessment. Psychomotor reveals no agitation retardation involuntary movement or abnormal posture. Speech is somewhat pressured however he is able to answer questions with relevance and there is no longer delayed latency of response. Eye contact is good during assessment. Mood is \"I am just fine. \"  Affect is relaxed pleasant cooperative congruent with stated mood. Thought process is linear goal-directed he is future focused. Thought content is devoid of auditory or visual hallucinations per the patient, he does have some underlying paranoia, however he is able to identify his feelings of paranoia and states that he has no intention to act on it. Devoid of suicidal or homicidal ideation intent or plan. Cognitive function appears to be baseline. Memory is intact during conversation. Insight judgment impulse control are adequate.   He is alert and oriented to person place time situation can recount the events leading to hospitalization      DIAGNOSIS:  Acute exacerbation of chronic paranoid schizophrenia          LABS: REVIEWED TODAY:  Recent Labs     12/21/21 0446   WBC 4.4*   HGB 11.9*        Recent Labs     12/21/21 0446      K 3.8      CO2 24   BUN 21*   CREATININE 0.9   GLUCOSE 82     Recent Labs     12/21/21 0446   BILITOT 0.6   ALKPHOS 109   AST 18   ALT 15     Lab Results   Component Value Date    LABAMPH NOT DETECTED 12/21/2021    BARBSCNU NOT DETECTED 12/21/2021 LABBENZ NOT DETECTED 12/21/2021    LABMETH NOT DETECTED 12/21/2021    OPIATESCREENURINE NOT DETECTED 12/21/2021    PHENCYCLIDINESCREENURINE NOT DETECTED 12/21/2021    ETOH <10 12/21/2021     Lab Results   Component Value Date    TSH 2.520 03/18/2017     No results found for: LITHIUM  No results found for: VALPROATE, CBMZ  No results found for: LITHIUM, VALPROATE      Radiology No results found. TREATMENT PLAN:  The patient's diagnosis, treatment plan, medication management were formulated after patient was seen directly by the attending physician and myself and all relevant documentation was reviewed. The patient was referred to outpatient/inpatient substance abuse rehabilitation programming. He was educated multiple times during the hospitalization that if he chooses to continue to use drugs or alcohol, he may potentially act out impulsively, resulting in serious harm to self or others, even though unintentional.  He was also educated that mental health treatment cannot be optimized with ongoing use of drugs. He demonstrated understanding has the capacity to understand that. Risk, benefit, side effects, possible outcomes of the medication and alternatives discussed with the patient and the patient demonstrated understanding. The patient was also educated that the outcome of treatment will depend on the medication compliance as directed by the prescribers along with regular follow-up, compliance with the labs and other work-up, as clinically indicated. Risk Management: Based on the diagnosis and assessment biopsychosocial treatment model was presented to the patient and was given the opportunity to ask any question. The patient was agreeable to the plan and all the patient's questions were answered to the patient's satisfaction. I discussed with the patient the risk, benefit, alternative and common side effects for the proposed medication treatment. The patient is consenting to this treatment. Collateral Information:  Will obtain collateral information from the family or friends. Will obtain medical records as appropriate from out patient providers  Will consult the hospitalist for a physical exam to rule out any co-morbid physical condition. Home medication Reconciled       New Medications started during this admission :    Depakote 500 mg BID for mood stabilization  Zyprexa 5 mg at night     Prn Haldol 5mg and Vistaril 50mg q6hr for extreme agitation. Trazodone as ordered for insomnia  Vistaril as ordered for anxiety      Psychotherapy:   Encourage participation in milieu and group therapy  Individual therapy as needed    NOTE: This report was transcribed using voice recognition software. Every effort was made to ensure accuracy; however, inadvertent computerized transcription errors may be present. Behavioral Services  Medicare Certification Upon Admission    I certify that this patient's inpatient psychiatric hospital admission is medically necessary for:    [x] (1) Treatment which could reasonably be expected to improve this patient's condition,       [x] (2) Or for diagnostic study;     AND     [x](2) The inpatient psychiatric services are provided while the individual is under the care of a physician and are included in the individualized plan of care.     Estimated length of stay/service 3 - 5 days based on stability    Plan for post-hospital care follow up with OP provider    Electronically signed by OSWALD Whitaker CNP on 12/22/2021 at 9:46 AM ambulatory

## 2023-10-24 NOTE — ED PROVIDER NOTE - PHYSICAL EXAMINATION
Vitals: I have reviewed the patients vital signs  General: nontoxic appearing  HEENT: Atraumatic, normocephalic, airway patent posterior oropharynx clear no foreign body  Eyes: EOMI, tracking appropriately  Neck: no tracheal deviation  Chest/Lungs: no trauma, symmetric chest rise, speaking in complete sentences,  no resp distress  Heart: skin and extremities well perfused, regular rate and rhythm  Neuro: A+Ox3, appears non focal  MSK: no deformities  Skin: no cyanosis, no jaundice   Psych:  Normal mood and affect

## 2023-10-24 NOTE — ED ADULT NURSE NOTE - CHIEF COMPLAINT QUOTE
xfer from [facility]- pt swallowed part of his denture and is lodged in airway- no distress/airway patent- hx ESRD

## 2023-10-24 NOTE — ED PROVIDER NOTE - PROGRESS NOTE DETAILS
ENT attempted multiple times at bedside to get foreign body out however were unsuccessful.  Patient will need medical clearance to go to operating room to have foreign body removed.  Patient admitted to hospitalist for medical clearance prior to the OR.

## 2023-10-24 NOTE — H&P ADULT - NSHPPHYSICALEXAM_GEN_ALL_CORE
PHYSICAL EXAM:  VITALS: Vital Signs Last 24 Hrs  T(C): 36.7 (24 Oct 2023 01:38), Max: 36.7 (23 Oct 2023 20:53)  T(F): 98 (24 Oct 2023 01:38), Max: 98.1 (23 Oct 2023 20:53)  HR: 82 (24 Oct 2023 01:38) (82 - 82)  BP: 137/77 (24 Oct 2023 01:38) (137/77 - 163/62)  BP(mean): --  RR: 20 (24 Oct 2023 01:38) (18 - 20)  SpO2: 100% (24 Oct 2023 01:38) (98% - 100%)    Parameters below as of 24 Oct 2023 01:38  Patient On (Oxygen Delivery Method): room air      GENERAL: NAD, comfortable at bedside  HEAD:  Atraumatic, Normocephalic  EYES: EOMI, PERRL, conjunctiva and sclera clear  ENT: Moist Mucus Membranes present, no ulcers appreciated  NECK: Supple, No JVD  CHEST/LUNG: Clear to auscultation bilaterally; No wheezes, rales or rhonchi, no accessory muscle use, not in respiratory distress  HEART: Regular rate and rhythm; No murmurs, rubs, or gallops, (+)S1, S2  ABDOMEN: Soft, Nontender, Nondistended; Normal Bowel sounds   EXTREMITIES: No clubbing, cyanosis, or edema  PSYCH: normal mood and affect  NEUROLOGY: AAOx3, non-focal  SKIN: No rashes or lesions

## 2023-10-24 NOTE — CHART NOTE - NSCHARTNOTEFT_GEN_A_CORE
informed by medicine provider that patient is discharged and inquired about returning to his outpatient dialysis center tomorrow. Patient goes to [facility] ([phone]) on [address]. Patient received treatment at his outpatient site on 10/23. Per H&P, he presented to the ED for an ENT consult after swallowing his denture.       contacted [facility] and left voice message for dialysis social worker and  informing that patient will return to dialysis tomorrow at his regular time. Patient was not dialyzed while in the hospital and was not admitted for a dialysis related concern.  discussed case with , Jose, who’s in agreement. Provider informed of above. No further social work intervention needed.
Patient seen and examined at bedside with Dr. Cardenas, explained to patient will try to attempt removal the denture at bedside to avoid OR.  Verbal consent obtained at bedside, ED provider and RN informed prior to procedure  Patient sit upright in bed, procedure explained to patient in detail at bedside.  Cetacaine sprayed into the oropharynx and instruct patient to gurgle and swallow. Laryngoscopy were done via right nostril and denture visualized at the post arytenoid space. Instructed patient to open his mouth and gauze used to pull the tongue to maximize the space. Right angle used to advanced to the post cricoid space and removed denture without any trauma, patient tolerated the procedure well.  Denture examined at bedside, confirmed with patient there is only one tooth attached to the denture. Denture returned back to patient at bedside.    - No further ENT intervention needed at this time  - No ENT contraindication to discharge this patient at this time  - Discussed the case with primary team provider  - ENT sign off  - Dispo per primary team

## 2023-10-24 NOTE — ED ADULT NURSE NOTE - OBJECTIVE STATEMENT
63 yom presents A&Ox4, transferred from [facility] for ENT eval. Pt states he accidentally swallowed a piece of his lower denture. Respirations even and unlabored, speaking in full sentences without any difficulty, sating 100% on room air. Pt denies any chest pain, sob, dizziness, nausea, vomiting or diarrhea. Pt arrived w/ 20g IV to R AC. ENT at bedside. No acute distress noted. Bed in lowest, safety maintained.

## 2023-10-24 NOTE — PROCEDURE NOTE - ADDITIONAL PROCEDURE DETAILS
Verbal consent obtained at bedside, ED provider and RN informed prior to procedure  Patient sit upright in bed, procedure explained to patient in detail at bedside.  Cetacaine sprayed into the oropharynx and instruct patient to gurgle and swallow. Laryngoscopy were done via right nostril and denture visualized at the post arytenoid space. Instructed patient to open his mouth and gauze used to pull the tongue to maximize the space. Right angle used to advanced to the post cricoid space and removed denture without any trauma, patient tolerated the procedure well.  Denture examined at bedside, confirmed with patient there is only one tooth attached to the denture. Denture returned back to patient at bedside.

## 2023-10-24 NOTE — DISCHARGE NOTE PROVIDER - NSDCCPCAREPLAN_GEN_ALL_CORE_FT
PRINCIPAL DISCHARGE DIAGNOSIS  Diagnosis: Foreign body in airway  Assessment and Plan of Treatment: ENT consulted- ENT removed the dentures from your esophagus.      SECONDARY DISCHARGE DIAGNOSES  Diagnosis: ESRD on hemodialysis  Assessment and Plan of Treatment: continue with dialysis as scheduled. you have dialysis tomorrow. follow up with nephrologist.    Diagnosis: Benign essential HTN  Assessment and Plan of Treatment: continue with home blood pressure medication.    Diagnosis: DM2 (diabetes mellitus, type 2)  Assessment and Plan of Treatment: continue with diabetic diet and home insulin regimen. follow up with endocrinologist.

## 2023-10-24 NOTE — DISCHARGE NOTE PROVIDER - HOSPITAL COURSE
62 y/o M with pmhx of ESRD, CAD with stents, presented to the ED for swallowing dentures. The patient was taking his medications last night and his dentures became loose and accidentally swallowed it. The patient could not cough it out. The patient was at [facility] where ENT had scoped the patient however did not have ENT and head and neck surgery there so he was transferred to [facility] for further evaluation. The patient was evaluated by ENT and they were still unable to remove the foreign object. The patient denies shortness of breath, only sore throat when swallowing. Denies fevers, chest pain or abdominal pain. The patient does not appear in respiratory distress    Patient seen and examined at bedside with Dr. Cardenas (ENT), explained to patient will try to attempt removal the denture at bedside to avoid OR.  Verbal consent obtained at bedside, ED provider and RN informed prior to procedure  Patient sit upright in bed, procedure explained to patient in detail at bedside.  Cetacaine sprayed into the oropharynx and instruct patient to gurgle and swallow. Laryngoscopy were done via right nostril and denture visualized at the post arytenoid space. Instructed patient to open his mouth and gauze used to pull the tongue to maximize the space. Right angle used to advanced to the post cricoid space and removed denture without any trauma, patient tolerated the procedure well.  Denture examined at bedside, confirmed with patient there is only one tooth attached to the denture. Denture returned back to patient at bedside.    - No further ENT intervention needed at this time  - No ENT contraindication to discharge this patient at this time    stable for discharge, d/w attending Dr. RACQUEL Sanchez. Pt had HD outpt tomorrow. 64 y/o M with pmhx of ESRD, CAD with stents, presented to the ED for swallowing dentures. The patient was taking his medications last night and his dentures became loose and accidentally swallowed it. The patient could not cough it out. The patient was at [facility] where ENT had scoped the patient however did not have ENT and head and neck surgery there so he was transferred to [facility] for further evaluation. The patient was evaluated by ENT and they were still unable to remove the foreign object. The patient denies shortness of breath, only sore throat when swallowing. Denies fevers, chest pain or abdominal pain. The patient does not appear in respiratory distress  Patient was seen by ENT, Laryngoscopy were done via right nostril and denture visualized at the post arytenoid space.  Denture was extracted by ENT and returned back to patient at bedside. Patient cleared by ENT for discharge.  Patient was last dialyzed yesterday and will resume outpatient dialysis tomorrow on 10/25.   Pt is stable for discharge home today 10/24.

## 2023-10-24 NOTE — CONSULT NOTE ADULT - PROBLEM SELECTOR RECOMMENDATION 9
- s/p multiple failed attempts of removal at bedside  - Added on to OR  , consented  - Please make NPO/IVF, pre opt labs   - Patient is on dialysis- will need nephrology clearance   - Patient hx of MI with stents - will need cardiology clearance

## 2023-10-24 NOTE — H&P ADULT - PROBLEM SELECTOR PLAN 5
- patient taking toujeo 20U and 10U humalog at home  - will start with 10U QHs with sliding scale, ED also already ordered 7U for early AM pending OR

## 2023-10-24 NOTE — DISCHARGE NOTE PROVIDER - NSDCFUSCHEDAPPT_GEN_ALL_CORE_FT
Mariella Fortune  [facility]  WOUNDC[facility] [address]  Scheduled Appointment: 10/26/2023    Steve Hernandez  [address]  Scheduled Appointment: 11/14/2023

## 2023-10-24 NOTE — H&P ADULT - PROBLEM SELECTOR PLAN 3
- hold BP meds pending OR - c/w Coreg regimen - half dose evenings before HD, QHS only on HD days  - hold valsartan for now

## 2023-10-24 NOTE — H&P ADULT - NSHPLABSRESULTS_GEN_ALL_CORE
10.4   6.27  )-----------( 132      ( 23 Oct 2023 23:55 )             30.9       10-23    136  |  94<L>  |  42<H>  ----------------------------<  336<H>  4.1   |  27  |  4.79<H>    Ca    8.6      23 Oct 2023 23:55  Mg     2.4     10-23    TPro  6.5  /  Alb  4.1  /  TBili  0.4  /  DBili  x   /  AST  14  /  ALT  15  /  AlkPhos  128<H>  10-23            PT/INR - ( 24 Oct 2023 00:59 )   PT: 10.2 sec;   INR: 0.93 ratio         PTT - ( 24 Oct 2023 00:59 )  PTT:31.2 sec        Urinalysis Basic - ( 23 Oct 2023 23:55 )    Color: x / Appearance: x / SG: x / pH: x  Gluc: 336 mg/dL / Ketone: x  / Bili: x / Urobili: x   Blood: x / Protein: x / Nitrite: x   Leuk Esterase: x / RBC: x / WBC x   Sq Epi: x / Non Sq Epi: x / Bacteria: x            CXR: As per my read - no opacities noted, Will wait for prelim/official read    EKG: As per my read - NSR no ST changes QTc 462 ms

## 2023-10-24 NOTE — H&P ADULT - ASSESSMENT
62 y/o M with pmhx of ESRD, CAD with stents, presented to the ED for swallowing dentures. 62 y/o M with pmhx of ESRD, CAD with stents, presented to the ED for swallowing dentures. Admit for foreign object removal.

## 2023-10-24 NOTE — CONSULT NOTE ADULT - SUBJECTIVE AND OBJECTIVE BOX
NEPHROLOGY     HPI:  This is a 62 y/o M with pmhx of ESRD, CAD with stents, presented to the ED for swallowing dentures. The patient was taking his medications last night and his dentures became loose and accidentally swallowed it. The patient could not cough it out. The patient was at [facility] where ENT had scoped the patient however did not have ENT and head and neck surgery there so he was transferred to [facility] for further evaluation. The patient was evaluated by ENT and they were still unable to remove the foreign object. The patient denies shortness of breath, only sore throat when swallowing. Denies fevers, chest pain or abdominal pain. The patient does not appear in respiratory distress. His last stent was in 11/2022. ROS otherwise negative. (24 Oct 2023 06:04)    Pt seen and examined, reports feeling better after piece of denture removed at bedside earlier, states still feels some mild discomfort, denies cp, no sob, + cough, comfortable on room air, in no acute distress. His last HD session was yesterday at [facility].     PAST MEDICAL & SURGICAL HISTORY:  Diabetes Mellitus Type II, Uncontrolled      Dyslipidemia      s/p Angioplasty with Stent      HTN (hypertension)      Gout      Diabetic retinopathy      GERD (gastroesophageal reflux disease)      Nephrolithiasis      Vitamin D deficiency      Hepatitis      CKD (chronic kidney disease)      Ischemic cardiomyopathy      ASHD (arteriosclerotic heart disease)      Pulmonary hypertension      Myocardial infarction      CAD (coronary artery disease)      BPH (benign prostatic hyperplasia)      Retinal Hemorrhage      S/P coronary artery stent placement  2010 TRICIA to Diag ; 11/18/2010  LAD; 2/4/11 ATOM liberte Diag; 5/15/2017  TRICIA to 1st diag; 6/7/17 PCI with laser and high pressure balloon      History of eye surgery  left eye      H/O lithotripsy      Diabetes with retinopathy  S/P PPV/PRP/GAS  OD 2/22/17 for viterous hemorrhage      CHF with cardiomyopathy  Insertion of Cardiomems monitor      Stented coronary artery          MEDICATIONS  (STANDING):  allopurinol 100 milliGRAM(s) Oral daily  ascorbic acid 500 milliGRAM(s) Oral daily  aspirin enteric coated 81 milliGRAM(s) Oral daily  atorvastatin 80 milliGRAM(s) Oral at bedtime  carvedilol 12.5 milliGRAM(s) Oral <User Schedule>  carvedilol 12.5 milliGRAM(s) Oral <User Schedule>  carvedilol 12.5 milliGRAM(s) Oral <User Schedule>  carvedilol 6.25 milliGRAM(s) Oral <User Schedule>  dextrose 5%. 1000 milliLiter(s) (100 mL/Hr) IV Continuous <Continuous>  dextrose 5%. 1000 milliLiter(s) (50 mL/Hr) IV Continuous <Continuous>  dextrose 50% Injectable 12.5 Gram(s) IV Push once  dextrose 50% Injectable 25 Gram(s) IV Push once  dextrose 50% Injectable 25 Gram(s) IV Push once  famotidine    Tablet 10 milliGRAM(s) Oral daily  folic acid 1 milliGRAM(s) Oral daily  gabapentin 300 milliGRAM(s) Oral two times a day  glucagon  Injectable 1 milliGRAM(s) IntraMuscular once  heparin   Injectable 5000 Unit(s) SubCutaneous every 12 hours  insulin glargine Injectable (LANTUS) 10 Unit(s) SubCutaneous at bedtime  insulin lispro (ADMELOG) corrective regimen sliding scale   SubCutaneous at bedtime  insulin lispro (ADMELOG) corrective regimen sliding scale   SubCutaneous three times a day before meals  senna 2 Tablet(s) Oral at bedtime  sertraline 25 milliGRAM(s) Oral daily  sevelamer carbonate 1600 milliGRAM(s) Oral three times a day      Allergies    shellfish (Unknown)  No Known Drug Allergies    Intolerances        SOCIAL HISTORY:  Denies alcohol abuse, drug abuse or tobacco usage.     FAMILY HISTORY:  Family history of cardiac disorder in father (Father)    VITALS:  T(C): 36.4 (10-24-23 @ 08:22), Max: 36.7 (10-23-23 @ 20:53)  HR: 80 (10-24-23 @ 08:22) (78 - 82)  BP: 156/71 (10-24-23 @ 08:22) (137/77 - 177/73)  RR: 16 (10-24-23 @ 08:22) (16 - 20)  SpO2: 100% (10-24-23 @ 08:22) (98% - 100%)    REVIEW OF SYSTEMS:  Denies any nausea, vomiting, diarrhea, fever or chills. Denies chest pain, SOB, focal weakness. Good oral intake and denies fatigue or weakness. All other pertinent systems are reviewed and are negative.    PHYSICAL EXAM:  Constitutional: NAD  HEENT: EOMI  Neck:  No JVD, supple   Respiratory: CTA B/L  Cardiovascular: S1 and S2, RRR  Gastrointestinal: + BS, soft, NT, ND  Extremities: No peripheral edema, + peripheral pulses  Neurological: A/O x 3, CN2-12 intact  Psychiatric: Normal mood, normal affect  : No Jay  Access: LUE AVF    LABS:                        10.4   6.27  )-----------( 132      ( 23 Oct 2023 23:55 )             30.9     10-23    136  |  94<L>  |  42<H>  ----------------------------<  336<H>  4.1   |  27  |  4.79<H>    Ca    8.6      23 Oct 2023 23:55  Mg     2.4     10-23    TPro  6.5  /  Alb  4.1  /  TBili  0.4  /  DBili  x   /  AST  14  /  ALT  15  /  AlkPhos  128<H>  10-23      RADIOLOGY & ADDITIONAL STUDIES:  < from: Xray Chest 2 Views PA/Lat (10.23.23 @ 21:31) >  IMPRESSION:  No radiopaque foreign body is visualized.    --- End of Report ---     FARHAD FERNANDO MD; Resident Radiologist  This document has been electronically signed.  ANGIE GUZMAN MD; Attending Interventional Radiologist  This document has been electronically signed. Oct 24 2023  8:36AM    < end of copied text >    < from: Xray Abdomen 2 Views (10.23.23 @ 21:38) >  FINDINGS:  Nonobstructive bowel gas pattern.  There is no evidence of intraperitoneal free air on this single supine   radiograph.  The visualized osseous structures demonstrate no acute pathology.  No radiopaque foreign body is visualized.    IMPRESSION:  Nonobstructive bowel gas pattern.  No radiopaque foreign body is visualized.    --- End of Report ---     FARHAD FERNANDO MD; Resident Radiologist  This document has been electronically signed.  ANGIE GUZMAN MD; Attending Interventional Radiologist  This document has been electronically signed. Oct 24 2023  8:35AM    < end of copied text >      ASSESSMENT/RECOMMEND  62 y/o M with pmhx of ESRD, CAD with stents, presented to the ED for swallowing dentures    1 Renal - s/p HD yesterday, due for HD tomorrow (inpt vs outpt)  2 ENT - s/p removal of piece of denture at bedside    D/c planning per primary team        NEPHROLOGY     HPI:  This is a 62 y/o M with pmhx of ESRD, CAD with stents, presented to the ED for swallowing dentures. The patient was taking his medications last night and his dentures became loose and accidentally swallowed it. The patient could not cough it out. The patient was at [facility] where ENT had scoped the patient however did not have ENT and head and neck surgery there so he was transferred to [facility] for further evaluation. The patient was evaluated by ENT and they were still unable to remove the foreign object. The patient denies shortness of breath, only sore throat when swallowing. Denies fevers, chest pain or abdominal pain. The patient does not appear in respiratory distress. His last stent was in 11/2022. ROS otherwise negative. (24 Oct 2023 06:04)    Pt seen and examined, reports feeling better after piece of denture removed at bedside earlier, states still feels some mild discomfort, denies cp, no sob, + cough, comfortable on room air, in no acute distress. His last HD session was yesterday at [facility].     PAST MEDICAL & SURGICAL HISTORY:  Diabetes Mellitus Type II, Uncontrolled      Dyslipidemia      s/p Angioplasty with Stent      HTN (hypertension)      Gout      Diabetic retinopathy      GERD (gastroesophageal reflux disease)      Nephrolithiasis      Vitamin D deficiency      Hepatitis      CKD (chronic kidney disease)      Ischemic cardiomyopathy      ASHD (arteriosclerotic heart disease)      Pulmonary hypertension      Myocardial infarction      CAD (coronary artery disease)      BPH (benign prostatic hyperplasia)      Retinal Hemorrhage      S/P coronary artery stent placement  2010 TRICIA to Diag ; 11/18/2010  LAD; 2/4/11 ATOM liberte Diag; 5/15/2017  TRICIA to 1st diag; 6/7/17 PCI with laser and high pressure balloon      History of eye surgery  left eye      H/O lithotripsy      Diabetes with retinopathy  S/P PPV/PRP/GAS  OD 2/22/17 for viterous hemorrhage      CHF with cardiomyopathy  Insertion of Cardiomems monitor      Stented coronary artery          MEDICATIONS  (STANDING):  allopurinol 100 milliGRAM(s) Oral daily  ascorbic acid 500 milliGRAM(s) Oral daily  aspirin enteric coated 81 milliGRAM(s) Oral daily  atorvastatin 80 milliGRAM(s) Oral at bedtime  carvedilol 12.5 milliGRAM(s) Oral <User Schedule>  carvedilol 12.5 milliGRAM(s) Oral <User Schedule>  carvedilol 12.5 milliGRAM(s) Oral <User Schedule>  carvedilol 6.25 milliGRAM(s) Oral <User Schedule>  dextrose 5%. 1000 milliLiter(s) (100 mL/Hr) IV Continuous <Continuous>  dextrose 5%. 1000 milliLiter(s) (50 mL/Hr) IV Continuous <Continuous>  dextrose 50% Injectable 12.5 Gram(s) IV Push once  dextrose 50% Injectable 25 Gram(s) IV Push once  dextrose 50% Injectable 25 Gram(s) IV Push once  famotidine    Tablet 10 milliGRAM(s) Oral daily  folic acid 1 milliGRAM(s) Oral daily  gabapentin 300 milliGRAM(s) Oral two times a day  glucagon  Injectable 1 milliGRAM(s) IntraMuscular once  heparin   Injectable 5000 Unit(s) SubCutaneous every 12 hours  insulin glargine Injectable (LANTUS) 10 Unit(s) SubCutaneous at bedtime  insulin lispro (ADMELOG) corrective regimen sliding scale   SubCutaneous at bedtime  insulin lispro (ADMELOG) corrective regimen sliding scale   SubCutaneous three times a day before meals  senna 2 Tablet(s) Oral at bedtime  sertraline 25 milliGRAM(s) Oral daily  sevelamer carbonate 1600 milliGRAM(s) Oral three times a day      Allergies    shellfish (Unknown)  No Known Drug Allergies    Intolerances        SOCIAL HISTORY:  Denies alcohol abuse, drug abuse or tobacco usage.     FAMILY HISTORY:  Family history of cardiac disorder in father (Father)    VITALS:  T(C): 36.4 (10-24-23 @ 08:22), Max: 36.7 (10-23-23 @ 20:53)  HR: 80 (10-24-23 @ 08:22) (78 - 82)  BP: 156/71 (10-24-23 @ 08:22) (137/77 - 177/73)  RR: 16 (10-24-23 @ 08:22) (16 - 20)  SpO2: 100% (10-24-23 @ 08:22) (98% - 100%)    REVIEW OF SYSTEMS:  Denies any nausea, vomiting, diarrhea, fever or chills. Denies chest pain, SOB, focal weakness. Good oral intake and denies fatigue or weakness. All other pertinent systems are reviewed and are negative.    PHYSICAL EXAM:  Constitutional: NAD  HEENT: EOMI  Neck:  No JVD, supple   Respiratory: CTA B/L  Cardiovascular: S1 and S2, RRR  Gastrointestinal: + BS, soft, NT, ND  Extremities: No peripheral edema, + peripheral pulses  Neurological: A/O x 3, CN2-12 intact  Psychiatric: Normal mood, normal affect  : No Jay  Access: LUE AVF    LABS:                        10.4   6.27  )-----------( 132      ( 23 Oct 2023 23:55 )             30.9     10-23    136  |  94<L>  |  42<H>  ----------------------------<  336<H>  4.1   |  27  |  4.79<H>    Ca    8.6      23 Oct 2023 23:55  Mg     2.4     10-23    TPro  6.5  /  Alb  4.1  /  TBili  0.4  /  DBili  x   /  AST  14  /  ALT  15  /  AlkPhos  128<H>  10-23      RADIOLOGY & ADDITIONAL STUDIES:  < from: Xray Chest 2 Views PA/Lat (10.23.23 @ 21:31) >  IMPRESSION:  No radiopaque foreign body is visualized.    --- End of Report ---     FARHAD FERNANDO MD; Resident Radiologist  This document has been electronically signed.  ANGIE GUZMAN MD; Attending Interventional Radiologist  This document has been electronically signed. Oct 24 2023  8:36AM    < end of copied text >    < from: Xray Abdomen 2 Views (10.23.23 @ 21:38) >  FINDINGS:  Nonobstructive bowel gas pattern.  There is no evidence of intraperitoneal free air on this single supine   radiograph.  The visualized osseous structures demonstrate no acute pathology.  No radiopaque foreign body is visualized.    IMPRESSION:  Nonobstructive bowel gas pattern.  No radiopaque foreign body is visualized.    --- End of Report ---     FARHAD FERNANDO MD; Resident Radiologist  This document has been electronically signed.  ANGIE GUZMAN MD; Attending Interventional Radiologist  This document has been electronically signed. Oct 24 2023  8:35AM    < end of copied text >

## 2023-10-24 NOTE — ED PROVIDER NOTE - OBJECTIVE STATEMENT
63-year-old male past medical history of CAD diabetes end-stage renal disease presenting after swallowing part of his dentures.  States it was a bottom front tooth retainer denture that he swallowed after taking his evening medications.  He currently feels significant pain in the back of his throat with swallowing but is able to tolerate secretions.  There was nothing visualized on imaging however ENT did scope the patient at bedside and was able to visualize the denture at the esophageal inlet.  They transferred him here for definitive care for bedside removal or if necessary operative intervention.

## 2023-10-24 NOTE — PROCEDURE NOTE - NSCOMPLICATION_GEN_A_CORE
EMERGENCY DEPARTMENT ENCOUNTER    CHIEF COMPLAINT  Chief Complaint: shortness of breath  History given by: family  History limited by: nothing  Room Number: 22/22  PMD: Michele Peguero MD      HPI:  Pt is a 80 y.o. female, currently being treated for cholangiocarcinoma, who presents complaining of shortness of breath onset last night that worsened this evening. Pt's family also states fever 6 days ago, that was reieived with aleve, increased fatigue and  decreased PO intake. Pt's last chemo treatment was 5 days ago and Pt's family states it is typical for the Pt to be fatigued for a day afterward but this time Pt's condition has not improved.     Duration:  1 day  Onset: gradual  Timing: constant  Intensity/Severity: moderate  Progression: worsening  Associated Symptoms: fever-resolved, decreased PO intake, fatigue  Aggravating Factors: none  Alleviating Factors: none  Previous Episodes: none  Treatment before arrival: none    PAST MEDICAL HISTORY  Active Ambulatory Problems     Diagnosis Date Noted   • Cholangiocarcinoma 03/07/2016   • Hyperbilirubinemia 03/07/2016   • Type 2 diabetes mellitus without complication 03/10/2016   • Hyperglycemia 03/19/2016   • Cancer related pain 03/19/2016   • Fever and chills 03/19/2016   • Chemotherapy-induced thrombocytopenia 03/20/2016   • Anemia associated with chemotherapy 03/20/2016   • Hypomagnesemia 03/20/2016   • Hypokalemia 03/20/2016   • Closed fracture of left olecranon process 07/12/2016   • Wrist swelling 07/15/2016   • Closed fracture of humerus 07/15/2016   • Encounter for adjustment or management of vascular access device 07/29/2016   • Bile duct obstruction 08/08/2016   • Neutropenic fever 10/12/2016   • Weakness 11/07/2016   • Generalized weakness 11/08/2016   • Emesis, persistent 11/08/2016   • Acute UTI 03/04/2017   • GERD (gastroesophageal reflux disease) 03/27/2017     Resolved Ambulatory Problems     Diagnosis Date Noted   • Thrombocytosis 03/07/2016      Past Medical History:   Diagnosis Date   • Anxiety    • Arthritis    • Bile duct cancer    • Broken arm    • Cancer    • Diabetes mellitus    • Diarrhea due to drug    • History of transfusion    • Hypertension    • PONV (postoperative nausea and vomiting)        PAST SURGICAL HISTORY  Past Surgical History:   Procedure Laterality Date   • ABDOMINAL SURGERY     • CATARACT EXTRACTION Bilateral 2015   • ENDOSCOPY N/A 11/17/2016    Procedure: ESOPHAGOGASTRODUODENOSCOPY  ;  Surgeon: Herb Buckner MD;  Location: [facility] ENDOSCOPY;  Service:    • HYSTERECTOMY  1986   • SC ERCP DX COLLECTION SPECIMEN BRUSHING/WASHING N/A 8/8/2016    Procedure: ENDOSCOPIC RETROGRADE CHOLANGIOPANCREATOGRAPHY with balloon sweep;  Surgeon: Herb Buckner MD;  Location: [facility] ENDOSCOPY;  Service: Gastroenterology   • SC ERCP DX COLLECTION SPECIMEN BRUSHING/WASHING N/A 3/26/2017    Prior biliary endoscopic sphincterotomy appeared open, two visibly occluded stents from the biliary tree were seen in the major papilla, left main hepatic duct and left intrahepatic branches were moderately dilated, both left and right biliary tree swept and sludge was found    • SC INSJ TUNNELED CVC W/O SUBQ PORT/ AGE 5 YR/> Right 3/7/2016    Procedure: INSERTION VENOUS ACCESS DEVICE, Power Port Placement, sonogram and fluroscopy;  Surgeon: Rich Keita MD;  Location: [facility] OR [facility];  Service: General   • TONSILLECTOMY         FAMILY HISTORY  Family History   Problem Relation Age of Onset   • Heart disease Father    • Hypertension Mother        SOCIAL HISTORY  Social History     Social History   • Marital status:      Spouse name: Carlitos   • Number of children: 4   • Years of education: High School     Occupational History   • Housewife Retired     Clothing  for department store     Social History Main Topics   • Smoking status: Never Smoker   • Smokeless tobacco: Never Used   • Alcohol use 1.2 - 1.8 oz/week     2 - 3 Glasses of  wine per week      Comment: weekly   • Drug use: No   • Sexual activity: Not Currently     Other Topics Concern   • Not on file     Social History Narrative    Patient has never smoked. Reports she drinks about 1.2 0 1.8 oz of alcohol per week.        ALLERGIES  Tegaderm ag mesh [silver]; Morphine and related; Sulfa antibiotics; and Oxaliplatin    REVIEW OF SYSTEMS  Review of Systems   Constitutional: Positive for appetite change (decreased PO intake), fatigue and fever (resolved). Negative for chills.   HENT: Negative for sore throat and trouble swallowing.    Eyes: Negative for visual disturbance.   Respiratory: Positive for shortness of breath. Negative for cough.    Cardiovascular: Negative for chest pain, palpitations and leg swelling.   Gastrointestinal: Negative for abdominal pain, diarrhea and vomiting.   Endocrine: Negative.    Genitourinary: Negative for decreased urine volume, dysuria and frequency.   Musculoskeletal: Negative for neck pain.   Skin: Negative for rash.   Allergic/Immunologic: Negative.    Neurological: Negative for syncope, weakness, numbness and headaches.   Hematological: Negative.    Psychiatric/Behavioral: Negative.    All other systems reviewed and are negative.      PHYSICAL EXAM  ED Triage Vitals   Temp Heart Rate Resp BP SpO2   04/07/17 2141 04/07/17 2141 04/07/17 2141 04/07/17 2141 04/07/17 2141   95.1 °F (35.1 °C) 138 16 139/73 99 %      Temp src Heart Rate Source Patient Position BP Location FiO2 (%)   04/07/17 2141 04/07/17 2141 04/07/17 2141 -- --   Tympanic Monitor Lying         Physical Exam   Constitutional: She is oriented to person, place, and time and well-developed, well-nourished, and in no distress. No distress.   HENT:   Head: Normocephalic and atraumatic.   Eyes: EOM are normal. Pupils are equal, round, and reactive to light.   Neck: Normal range of motion. Neck supple. JVD (2 cm) present.   Cardiovascular: Normal heart sounds.  An irregularly irregular rhythm  present. Tachycardia present.    Pulmonary/Chest: Effort normal and breath sounds normal. No respiratory distress.   Abdominal: Soft. There is no tenderness. There is no rebound and no guarding.   Musculoskeletal: Normal range of motion. She exhibits no edema.   Neurological: She is alert and oriented to person, place, and time. She has normal sensation and normal strength.   Skin: Skin is warm and dry. No rash noted.   Psychiatric: Mood and affect normal.   Nursing note and vitals reviewed.      LAB RESULTS  Lab Results (last 24 hours)     Procedure Component Value Units Date/Time    CBC & Differential [[id number]] Collected:  04/07/17 2246    Specimen:  Blood Updated:  04/07/17 2321    Narrative:       The following orders were created for panel order CBC & Differential.  Procedure                               Abnormality         Status                     ---------                               -----------         ------                     Scan Slide[[id number]]                                        Final result               CBC Auto Differential[[id number]]         Abnormal            Final result                 Please view results for these tests on the individual orders.    Lactic Acid, Plasma [[id number]]  (Abnormal) Collected:  04/07/17 2246    Specimen:  Blood from Arm, Right Updated:  04/07/17 2330     Lactate 4.4 (C) mmol/L     CBC Auto Differential [[id number]]  (Abnormal) Collected:  04/07/17 2246    Specimen:  Blood from Arm, Right Updated:  04/07/17 2321     WBC 8.84 10*3/mm3      RBC 3.11 (L) 10*6/mm3      Hemoglobin 11.3 (L) g/dL      Hematocrit 33.4 (L) %      .4 (H) fL      MCH 36.3 (H) pg      MCHC 33.8 g/dL      RDW 18.4 (H) %      RDW-SD 72.4 (H) fl      MPV 11.4 fL      Platelets 123 (L) 10*3/mm3      Neutrophil % 92.4 (H) %      Lymphocyte % 6.0 (L) %      Monocyte % 0.3 (L) %      Eosinophil % 1.2 %      Basophil % 0.1 %      Immature Grans % 0.0 %      Neutrophils, Absolute 8.16 (H)  10*3/mm3      Lymphocytes, Absolute 0.53 (L) 10*3/mm3      Monocytes, Absolute 0.03 (L) 10*3/mm3      Eosinophils, Absolute 0.11 10*3/mm3      Basophils, Absolute 0.01 10*3/mm3      Immature Grans, Absolute 0.00 10*3/mm3     Scan Slide [[id number]] Collected:  04/07/17 2246    Specimen:  Blood from Arm, Right Updated:  04/07/17 2321     Dacrocytes Slight/1+     Macrocytes Mod/2+     Toxic Granulation Slight/1+     Platelet Morphology Normal    Comprehensive Metabolic Panel [[id number]]  (Abnormal) Collected:  04/07/17 2247    Specimen:  Blood from Arm, Right Updated:  04/07/17 2329     Glucose 163 (H) mg/dL      BUN 55 (H) mg/dL      Creatinine 1.54 (H) mg/dL      Sodium 129 (L) mmol/L      Potassium 6.3 (C) mmol/L      Chloride 96 (L) mmol/L      CO2 18.0 (L) mmol/L      Calcium 8.8 mg/dL      Total Protein 6.8 g/dL      Albumin 2.20 (L) g/dL      ALT (SGPT) 31 U/L      AST (SGOT) 40 (H) U/L      Alkaline Phosphatase 219 (H) U/L      Total Bilirubin 5.7 (H) mg/dL      eGFR Non African Amer 32 (L) mL/min/1.73      Globulin 4.6 gm/dL      A/G Ratio 0.5 g/dL      BUN/Creatinine Ratio 35.7 (H)     Anion Gap 15.0 mmol/L     Narrative:       The MDRD GFR formula is only valid for adults with stable renal function between ages 18 and 70.    aPTT [[id number]]  (Abnormal) Collected:  04/07/17 2247    Specimen:  Blood from Arm, Right Updated:  04/07/17 2309     PTT 40.3 (H) seconds     CK [[id number]]  (Abnormal) Collected:  04/07/17 2247    Specimen:  Blood from Arm, Right Updated:  04/07/17 2321     Creatine Kinase 14 (L) U/L     Magnesium [[id number]]  (Normal) Collected:  04/07/17 2247    Specimen:  Blood from Arm, Right Updated:  04/07/17 2323     Magnesium 2.3 mg/dL     Phosphorus [[id number]]  (Abnormal) Collected:  04/07/17 2247    Specimen:  Blood from Arm, Right Updated:  04/07/17 2321     Phosphorus 5.0 (H) mg/dL     Ketone Bodies, Serum (will not run at [place]) [[id number]] Collected:  04/07/17 2247    Specimen:  Blood  Updated:  04/07/17 2327    Narrative:       The following orders were created for panel order Ketone Bodies, Serum (will not run at [place]).  Procedure                               Abnormality         Status                     ---------                               -----------         ------                     Acetone[[id number]]                       Normal              Final result                 Please view results for these tests on the individual orders.    Blood Culture [[id number]] Collected:  04/07/17 2247    Specimen:  Blood from Arm, Right Updated:  04/07/17 2252    Acetone [[id number]]  (Normal) Collected:  04/07/17 2247    Specimen:  Blood from Arm, Right Updated:  04/07/17 2327     Acetone Negative    Protime-INR [[id number]]  (Abnormal) Collected:  04/07/17 2247    Specimen:  Blood from Arm, Right Updated:  04/07/17 2335     Protime 19.4 (H) Seconds      INR 1.70 (H)    Troponin [[id number]]  (Normal) Collected:  04/07/17 2247    Specimen:  Blood from Arm, Right Updated:  04/08/17 0025     Troponin T <0.010 ng/mL     Narrative:       Troponin T Reference Ranges:  Less than 0.03 ng/mL:    Negative for AMI  0.03 to 0.09 ng/mL:      Indeterminant for AMI  Greater than 0.09 ng/mL: Positive for AMI    Procalcitonin [[id number]] Collected:  04/07/17 2247    Specimen:  Blood from Arm, Right Updated:  04/08/17 0109    Blood Culture [[id number]] Collected:  04/07/17 2321    Specimen:  Blood from Blood, Central Line Updated:  04/07/17 2327    Urinalysis With / Culture If Indicated [[id number]]  (Abnormal) Collected:  04/07/17 2340    Specimen:  Urine from Urine, Catheter Updated:  04/07/17 2351     Color, UA Colette (A)     Appearance, UA Cloudy (A)     pH, UA <=5.0     Specific Gravity, UA 1.018     Glucose, UA Negative     Ketones, UA Trace (A)     Bilirubin, UA Moderate (2+) (A)     Blood, UA Large (3+) (A)     Protein, UA Negative     Leuk Esterase, UA Moderate (2+) (A)     Nitrite, UA Negative     Urobilinogen, UA 1.0  E.U./dL    Urinalysis, Microscopic Only [[id number]]  (Abnormal) Collected:  04/07/17 2340    Specimen:  Urine from Urine, Catheter Updated:  04/08/17 0027     RBC, UA 6-12 (A) /HPF      WBC, UA 6-12 (A) /HPF      Bacteria, UA None Seen /HPF      Squamous Epithelial Cells, UA 3-6 (A) /HPF      Transitional Epithelial Cells, UA 0-2 /HPF      Hyaline Casts, UA 3-6 /LPF      Amorphous Crystals, UA Small/1+ /HPF      Methodology Manual Light Microscopy    Urine Culture [[id number]] Collected:  04/07/17 2340    Specimen:  Urine from Urine, Catheter Updated:  04/07/17 2349    Basic Metabolic Panel [[id number]]  (Abnormal) Collected:  04/08/17 0058    Specimen:  Blood Updated:  04/08/17 0127     Glucose 162 (H) mg/dL      BUN 48 (H) mg/dL      Creatinine 1.31 (H) mg/dL      Sodium 132 (L) mmol/L      Potassium 5.5 (H) mmol/L      Chloride 101 mmol/L      CO2 17.7 (L) mmol/L      Calcium 8.2 (L) mg/dL      eGFR Non African Amer 39 (L) mL/min/1.73      BUN/Creatinine Ratio 36.6 (H)     Anion Gap 13.3 mmol/L     Narrative:       The MDRD GFR formula is only valid for adults with stable renal function between ages 18 and 70.    POC Glucose Fingerstick [[id number]]  (Abnormal) Collected:  04/08/17 0117    Specimen:  Blood Updated:  04/08/17 0118     Glucose 149 (H) mg/dL     Narrative:       Meter: [id number] : [id number] Eddie Burrows          I ordered the above labs and reviewed the results    RADIOLOGY  XR Chest 2 View   Final Result      CT Chest Without Contrast    (Results Pending)   CT Abdomen Pelvis Without Contrast    (Results Pending)   CXR: The lungs are well-expanded and clear except for very small  bilateral pleural effusions as also noted on the chest CT scan of  03/21/2017. The heart is enlarged with some minimal upper lobe vascular  prominence. A right-sided MediPort catheter ends in the SVC.     This report was finalized on 4/7/2017 11:53 PM by Dr. Salvatore Lobo MD.     I ordered the above noted radiological  studies. Interpreted by radiologist. Reviewed by me in PACS.       PROCEDURES  Critical Care  Performed by: DEVENDRA MARISCAL  Authorized by: DEVENDRA MARISCAL   Total critical care time: 30 minutes  Critical care time was exclusive of separately billable procedures and treating other patients.  Critical care was necessary to treat or prevent imminent or life-threatening deterioration of the following conditions: sepsis.  Critical care was time spent personally by me on the following activities: blood draw for specimens, development of treatment plan with patient or surrogate, discussions with consultants, evaluation of patient's response to treatment, examination of patient, obtaining history from patient or surrogate, ordering and performing treatments and interventions, ordering and review of laboratory studies, ordering and review of radiographic studies, re-evaluation of patient's condition and review of old charts.        EKG         EKG time: 20:59  Rhythm/Rate: A-fib, rate 145  QRS, axis: normal   ST and T waves: non specific ST changes  Low voltage     Interpreted Contemporaneously by me, independently viewed  Changed compared to prior 3/4/17- Afib with RVR is new      PROGRESS AND CONSULTS  ED Course   Value Comment By Time   Toxic Granulation: Slight/1+ (Reviewed) Devendra Mariscal MD 04/08 0015     10:20 PM  Ordered labs, CXR, and EKG for further evaluation.     11:25 PM  Notified by RN of BP 86/53. Will bolus fluids.     11:58 PM  Discussed Pt's case with Dr. Min (hematology & oncology) who recommends the Pt be admitted to the ICU.   BP 93/58    12:14 AM  Discussed Pt's case with Dr. Castellano who agrees to admit to the ICU and request the Pt be given cefapime and vancomycin.     12:44 AM  Pt rechecked and is resting comfortably. BP- 96/72 HR- (!) 122 Temp- 95.1 °F (35.1 °C) (Tympanic) O2 sat- 98%. All pertinent lab/imaging/EKG findings discussed including elevated lactic acid as a marker for sepsis. Pt/family  verbalized understanding and agree with plan to admit to ICU for further treatment. All questions and concerns addressed at this time.       MEDICAL DECISION MAKING  Results were reviewed/discussed with the patient and they were also made aware of online access. Pt also made aware that some labs, such as cultures, will not be resulted during ER visit and follow up with PMD is necessary.     MDM  Number of Diagnoses or Management Options     Amount and/or Complexity of Data Reviewed  Clinical lab tests: ordered and reviewed (K: 6.3  Lactate: 4.4)  Tests in the radiology section of CPT®: ordered and reviewed (CXR: The lungs are well-expanded and clear except for very small bilateral pleural effusions as also noted on the chest CT scan of 03/21/2017. The heart is enlarged with some minimal upper lobe vascular prominence. A right-sided MediPort catheter ends in the SVC.)  Tests in the medicine section of CPT®: ordered and reviewed  Decide to obtain previous medical records or to obtain history from someone other than the patient: yes  Discuss the patient with other providers: yes (Dr. Min)  Independent visualization of images, tracings, or specimens: yes    Patient Progress  Patient progress: stable         DIAGNOSIS  Final diagnoses:   Acute renal failure, unspecified acute renal failure type   Hyperphosphatemia   UTI (urinary tract infection) with pyuria   Weakness   Sepsis, due to unspecified organism   Atrial fibrillation with RVR       DISPOSITION  ADMISSION: Dr. Min    Discussed treatment plan and reason for admission with pt/family and admitting physician.  Pt/family voiced understanding of the plan for admission for further testing/treatment as needed.       Latest Documented Vital Signs:  As of 1:35 AM  BP- 119/72 HR- 118 Temp- 95.1 °F (35.1 °C) (Tympanic) O2 sat- 99%    --  Documentation assistance provided by deyvi Ordoñez for Dr. Gleason.  Information recorded by the deyvi was done at my  direction and has been verified and validated by me.        Suyapa Ordoñez  04/08/17 0135       Russell Gleason MD  04/08/17 1241     no complications

## 2023-10-24 NOTE — ED ADULT TRIAGE NOTE - CHIEF COMPLAINT QUOTE
xfer from [facility]- pt swallowed part of his denture and is lodged in airway- no distress/airway patent- hx ESRD

## 2023-10-24 NOTE — CONSULT NOTE ADULT - ASSESSMENT
63 pmhx DMII, CAD (4 stents),prior MI, and ESRD on dialysis, presenting for foreign body sensation after swallowing his denture- Pt transferred from [facility] to [facility] for further EVal - FOE: + denture noted at esophogeal inlet with b/l VC mobile and airway patent - after multiple failed attempts to removed foreign body at bedside - likely d/t Mallampati score III
     64 y/o M with pmhx of ESRD, CAD with stents, presented to the ED for swallowing dentures    1 Renal - s/p HD yesterday, due for HD tomorrow (inpt vs outpt)  2 ENT - s/p removal of piece of denture at bedside    D/c planning per primary team     [facility]   [phone]

## 2023-10-24 NOTE — PROGRESS NOTE ADULT - ASSESSMENT
62 y/o M with pmhx of ESRD, CAD with stents, presented to the ED for swallowing dentures. Admit for foreign object removal.

## 2023-10-24 NOTE — PROGRESS NOTE ADULT - SUBJECTIVE AND OBJECTIVE BOX
Dee Sanchez MD  Pager [phone]    CHIEF COMPLAINT: Patient is a 63y old  male who presents with a chief complaint of swallowed dentures (24 Oct 2023 10:58)      SUBJECTIVE / OVERNIGHT EVENTS: denies chest pain or sob     MEDICATIONS  (STANDING):  allopurinol 100 milliGRAM(s) Oral daily  ascorbic acid 500 milliGRAM(s) Oral daily  aspirin enteric coated 81 milliGRAM(s) Oral daily  atorvastatin 80 milliGRAM(s) Oral at bedtime  carvedilol 12.5 milliGRAM(s) Oral <User Schedule>  carvedilol 12.5 milliGRAM(s) Oral <User Schedule>  carvedilol 12.5 milliGRAM(s) Oral <User Schedule>  carvedilol 6.25 milliGRAM(s) Oral <User Schedule>  dextrose 5%. 1000 milliLiter(s) (100 mL/Hr) IV Continuous <Continuous>  dextrose 5%. 1000 milliLiter(s) (50 mL/Hr) IV Continuous <Continuous>  dextrose 50% Injectable 12.5 Gram(s) IV Push once  dextrose 50% Injectable 25 Gram(s) IV Push once  dextrose 50% Injectable 25 Gram(s) IV Push once  famotidine    Tablet 10 milliGRAM(s) Oral daily  folic acid 1 milliGRAM(s) Oral daily  gabapentin 300 milliGRAM(s) Oral two times a day  glucagon  Injectable 1 milliGRAM(s) IntraMuscular once  heparin   Injectable 5000 Unit(s) SubCutaneous every 12 hours  insulin glargine Injectable (LANTUS) 10 Unit(s) SubCutaneous at bedtime  insulin lispro (ADMELOG) corrective regimen sliding scale   SubCutaneous at bedtime  insulin lispro (ADMELOG) corrective regimen sliding scale   SubCutaneous three times a day before meals  senna 2 Tablet(s) Oral at bedtime  sertraline 25 milliGRAM(s) Oral daily  sevelamer carbonate 1600 milliGRAM(s) Oral three times a day    MEDICATIONS  (PRN):  acetaminophen     Tablet .. 650 milliGRAM(s) Oral every 6 hours PRN Temp greater or equal to 38C (100.4F), Mild Pain (1 - 3)  aluminum hydroxide/magnesium hydroxide/simethicone Suspension 30 milliLiter(s) Oral every 4 hours PRN Dyspepsia  dextrose Oral Gel 15 Gram(s) Oral once PRN Blood Glucose LESS THAN 70 milliGRAM(s)/deciliter  melatonin 3 milliGRAM(s) Oral at bedtime PRN Insomnia  ondansetron Injectable 4 milliGRAM(s) IV Push every 8 hours PRN Nausea and/or Vomiting      VITALS:  T(F): 97.5 (10-24-23 @ 08:22), Max: 98.1 (10-23-23 @ 20:53)  HR: 80 (10-24-23 @ 08:22) (78 - 82)  BP: 156/71 (10-24-23 @ 08:22) (137/77 - 177/73)  RR: 16 (10-24-23 @ 08:22) (16 - 20)  SpO2: 100% (10-24-23 @ 08:22)  Height (cm): 170.2 (01:38), 170.2 (20:53)  Weight (kg): 81.6 (20:53)  BMI (kg/m2): 28.2 (01:38), 28.2 (20:53)    CAPILLARY BLOOD GLUCOSE    Output   Height (cm): 170.2 (01:38), 170.2 (20:53)  Weight (kg): 81.6 (20:53)  BMI (kg/m2): 28.2 (01:38), 28.2 (20:53)  I&O's Summary  T(F): 97.5 (10-24-23 @ 08:22), Max: 98.1 (10-23-23 @ 20:53)  HR: 80 (10-24-23 @ 08:22) (78 - 82)  BP: 156/71 (10-24-23 @ 08:22) (137/77 - 177/73)  RR: 16 (10-24-23 @ 08:22) (16 - 20)  SpO2: 100% (10-24-23 @ 08:22)    PHYSICAL EXAM:  GENERAL: NAD, well-developed  HEAD:  Atraumatic, Normocephalic  EYES: EOMI, PERRLA, conjunctiva and sclera clear  NECK: Supple, No JVD  CHEST/LUNG: Clear to auscultation bilaterally; No wheeze  HEART: Regular rate and rhythm; No murmurs, rubs, or gallops  ABDOMEN: Soft, Nontender, Nondistended; Bowel sounds present  EXTREMITIES:  2+ Peripheral Pulses, No clubbing, cyanosis, or edema, LUE AVF with bruit/thrill  PSYCH: AAOx3  NEUROLOGY: non-focal  SKIN: No rashes or lesions    LABS:              10.4                 136  | 27   | 42           6.27  >-----------< 132     ------------------------< 336                   30.9                 4.1  | 94   | 4.79                                         Ca 8.6   Mg 2.4   Ph x           TPro  6.5  /  Alb  4.1      TBili  0.4  /  DBili  x         AST  14  /  ALT  15            AlkPhos  128      INR: 0.93 ratio;    PT: 10.2 sec;    PTT: 31.2 sec        Urinalysis Basic - ( 23 Oct 2023 23:55 )    Color: x / Appearance: x / SG: x / pH: x  Gluc: 336 mg/dL / Ketone: x  / Bili: x / Urobili: x   Blood: x / Protein: x / Nitrite: x   Leuk Esterase: x / RBC: x / WBC x   Sq Epi: x / Non Sq Epi: x / Bacteria: x            MICROBIOLOGY:        RADIOLOGY & ADDITIONAL TESTS:    Imaging Personally Reviewed:  < from: Xray Abdomen 2 Views (10.23.23 @ 21:38) >  Nonobstructive bowel gas pattern.  No radiopaque foreign body is visualized.      [ ] Consultant(s) Notes Reviewed:  [ ] Care Discussed with Consultants/Other Providers: liliana Ramirez extracted, dc home today, outpt HD tomorrow

## 2023-10-24 NOTE — H&P ADULT - PROBLEM SELECTOR PLAN 4
- cardiology consult for cards clearance due to hx of CAD with stents last year  - will hold plavix for now since stents are >6 months old

## 2023-10-24 NOTE — PROGRESS NOTE ADULT - PROBLEM SELECTOR PLAN 4
- cardiology consult for cards clearance due to hx of CAD with stents last year  - resume home meds including plavix  no need for OR

## 2023-10-24 NOTE — DISCHARGE NOTE PROVIDER - CARE PROVIDER_API CALL
Carmen Montaño  Nephrology  [address], [address]  [place] [address]  Phone: [phone]  Fax: [phone]  Follow Up Time:

## 2023-10-24 NOTE — H&P ADULT - NSHPREVIEWOFSYSTEMS_GEN_ALL_CORE
REVIEW OF SYSTEMS:    CONSTITUTIONAL: No weakness, fevers or chills  EYES/ENT: No visual changes;  No dysphagia; + sore throat; No rhinorrhea; No sinus pain/pressure  NECK: no neck stiffness  RESPIRATORY: No cough, wheezing, hemoptysis; No shortness of breath  CARDIOVASCULAR: No chest pain or palpitations; No lower extremity edema  GASTROINTESTINAL: No abdominal or epigastric pain. No nausea, vomiting, or hematemesis; No diarrhea or constipation. No melena or hematochezia.  GENITOURINARY: No dysuria, frequency or hematuria  NEUROLOGICAL: No numbness or weakness  MSK: ambulates without assistance  SKIN: No itching, burning, rashes, or lesions   All other review of systems is negative unless indicated above.

## 2023-10-26 ENCOUNTER — OUTPATIENT (OUTPATIENT)
Dept: OUTPATIENT SERVICES | Facility: HOSPITAL | Age: 63
LOS: 1 days | End: 2023-10-26
Payer: MEDICARE

## 2023-10-26 ENCOUNTER — APPOINTMENT (OUTPATIENT)
Dept: WOUND CARE | Facility: HOSPITAL | Age: 63
End: 2023-10-26
Payer: MEDICARE

## 2023-10-26 VITALS
HEART RATE: 77 BPM | DIASTOLIC BLOOD PRESSURE: 65 MMHG | OXYGEN SATURATION: 98 % | RESPIRATION RATE: 18 BRPM | SYSTOLIC BLOOD PRESSURE: 120 MMHG | TEMPERATURE: 97.8 F

## 2023-10-26 DIAGNOSIS — E11.319 TYPE 2 DIABETES MELLITUS WITH UNSPECIFIED DIABETIC RETINOPATHY WITHOUT MACULAR EDEMA: Chronic | ICD-10-CM

## 2023-10-26 DIAGNOSIS — I50.9 HEART FAILURE, UNSPECIFIED: Chronic | ICD-10-CM

## 2023-10-26 DIAGNOSIS — L97.509 TYPE 2 DIABETES MELLITUS WITH FOOT ULCER: ICD-10-CM

## 2023-10-26 DIAGNOSIS — Z95.5 PRESENCE OF CORONARY ANGIOPLASTY IMPLANT AND GRAFT: Chronic | ICD-10-CM

## 2023-10-26 DIAGNOSIS — E11.621 TYPE 2 DIABETES MELLITUS WITH FOOT ULCER: ICD-10-CM

## 2023-10-26 DIAGNOSIS — Z98.89 OTHER SPECIFIED POSTPROCEDURAL STATES: Chronic | ICD-10-CM

## 2023-10-26 PROCEDURE — 99213 OFFICE O/P EST LOW 20 MIN: CPT

## 2023-10-26 PROCEDURE — G0463: CPT

## 2023-10-26 RX ORDER — SERTRALINE 25 MG/1
25 TABLET, FILM COATED ORAL
Refills: 0 | Status: ACTIVE | COMMUNITY

## 2023-10-30 PROBLEM — E11.621 CHRONIC DIABETIC ULCER OF FOOT DETERMINED BY EXAMINATION: Status: ACTIVE | Noted: 2023-04-17

## 2023-10-30 NOTE — DISCHARGE NOTE PROVIDER - NSDCHHBASESERVICE_GEN_ALL_CORE
#Syncope  #New Atrial Fibrillation     55M with hx of syncope (s/p ILR), Bipolar Disorder, HTN who presents with syncope, found to have new pAfib. CTH negative aside from mild L posterior scalp soft tissue swelling. Patient seen by EP - ILR showed new afib. Started on metoprolol. Patient will followup with EP outpatient for possible ablation. Stable for discharge home. Nursing

## 2023-10-30 NOTE — DIETITIAN INITIAL EVALUATION ADULT - PERTINENT MEDS FT
MEDICATIONS  (STANDING):  allopurinol 100 milliGRAM(s) Oral <User Schedule>  aspirin  chewable 81 milliGRAM(s) Oral daily  atorvastatin 80 milliGRAM(s) Oral at bedtime  carvedilol 12.5 milliGRAM(s) Oral <User Schedule>  clopidogrel Tablet 75 milliGRAM(s) Oral daily  dextrose 5%. 1000 milliLiter(s) (50 mL/Hr) IV Continuous <Continuous>  dextrose 5%. 1000 milliLiter(s) (100 mL/Hr) IV Continuous <Continuous>  dextrose 50% Injectable 25 Gram(s) IV Push once  dextrose 50% Injectable 12.5 Gram(s) IV Push once  dextrose 50% Injectable 25 Gram(s) IV Push once  epoetin wayne-epbx (RETACRIT) Injectable 78215 Unit(s) IV Push <User Schedule>  famotidine    Tablet 10 milliGRAM(s) Oral daily  gabapentin 100 milliGRAM(s) Oral <User Schedule>  gabapentin 200 milliGRAM(s) Oral at bedtime  glucagon  Injectable 1 milliGRAM(s) IntraMuscular once  heparin   Injectable 5000 Unit(s) SubCutaneous every 8 hours  insulin glargine Injectable (LANTUS) 12 Unit(s) SubCutaneous at bedtime  insulin lispro (ADMELOG) corrective regimen sliding scale   SubCutaneous two times a day before meals  polyethylene glycol 3350 17 Gram(s) Oral two times a day  senna 2 Tablet(s) Oral at bedtime  tamsulosin 0.4 milliGRAM(s) Oral at bedtime  valsartan 40 milliGRAM(s) Oral daily  zinc oxide 40% Paste 1 Application(s) Topical daily    MEDICATIONS  (PRN):  acetaminophen     Tablet .. 650 milliGRAM(s) Oral every 6 hours PRN Temp greater or equal to 38C (100.4F)  dextrose Oral Gel 15 Gram(s) Oral once PRN Blood Glucose LESS THAN 70 milliGRAM(s)/deciliter  melatonin 3 milliGRAM(s) Oral at bedtime PRN Insomnia  
Statement Selected

## 2023-11-03 NOTE — H&P ADULT - PROBLEM SELECTOR PLAN 7
see HPI - history of CAD with 4 stents, unsure of date of last stent placement  - no current symptoms at this time  - c/w asa, brilinta, carvedilol

## 2023-11-06 DIAGNOSIS — E11.621 TYPE 2 DIABETES MELLITUS WITH FOOT ULCER: ICD-10-CM

## 2023-11-06 DIAGNOSIS — L97.509 NON-PRESSURE CHRONIC ULCER OF OTHER PART OF UNSPECIFIED FOOT WITH UNSPECIFIED SEVERITY: ICD-10-CM

## 2023-11-06 DIAGNOSIS — E11.9 TYPE 2 DIABETES MELLITUS WITHOUT COMPLICATIONS: ICD-10-CM

## 2023-11-14 ENCOUNTER — APPOINTMENT (OUTPATIENT)
Dept: ENDOCRINOLOGY | Facility: CLINIC | Age: 63
End: 2023-11-14
Payer: MEDICARE

## 2023-11-14 VITALS
OXYGEN SATURATION: 96 % | DIASTOLIC BLOOD PRESSURE: 76 MMHG | SYSTOLIC BLOOD PRESSURE: 140 MMHG | BODY MASS INDEX: 29.19 KG/M2 | HEIGHT: 67 IN | HEART RATE: 88 BPM | WEIGHT: 186 LBS

## 2023-11-14 DIAGNOSIS — E11.21 TYPE 2 DIABETES MELLITUS WITH DIABETIC NEPHROPATHY: ICD-10-CM

## 2023-11-14 DIAGNOSIS — E11.42 TYPE 2 DIABETES MELLITUS WITH DIABETIC POLYNEUROPATHY: ICD-10-CM

## 2023-11-14 DIAGNOSIS — E11.319 TYPE 2 DIABETES MELLITUS WITH UNSPECIFIED DIABETIC RETINOPATHY W/OUT MACULAR EDEMA: ICD-10-CM

## 2023-11-14 PROCEDURE — 99214 OFFICE O/P EST MOD 30 MIN: CPT | Mod: 25

## 2023-11-14 PROCEDURE — 83036 HEMOGLOBIN GLYCOSYLATED A1C: CPT | Mod: QW

## 2023-11-14 RX ORDER — FLASH GLUCOSE SENSOR
KIT MISCELLANEOUS
Qty: 6 | Refills: 1 | Status: ACTIVE | COMMUNITY
Start: 2020-02-21 | End: 1900-01-01

## 2023-11-14 RX ORDER — DULAGLUTIDE 0.75 MG/.5ML
0.75 INJECTION, SOLUTION SUBCUTANEOUS
Qty: 4 | Refills: 5 | Status: DISCONTINUED | COMMUNITY
Start: 2022-02-18 | End: 2023-11-14

## 2023-11-15 LAB — HBA1C MFR BLD HPLC: 9.6

## 2023-11-23 ENCOUNTER — INPATIENT (INPATIENT)
Facility: HOSPITAL | Age: 63
LOS: 4 days | Discharge: HOME | DRG: 189 | End: 2023-11-28
Attending: INTERNAL MEDICINE | Admitting: INTERNAL MEDICINE
Payer: MEDICARE

## 2023-11-23 VITALS
DIASTOLIC BLOOD PRESSURE: 70 MMHG | TEMPERATURE: 102 F | RESPIRATION RATE: 20 BRPM | HEIGHT: 67 IN | OXYGEN SATURATION: 98 % | SYSTOLIC BLOOD PRESSURE: 168 MMHG | WEIGHT: 175.05 LBS | HEART RATE: 114 BPM

## 2023-11-23 DIAGNOSIS — E11.319 TYPE 2 DIABETES MELLITUS WITH UNSPECIFIED DIABETIC RETINOPATHY WITHOUT MACULAR EDEMA: Chronic | ICD-10-CM

## 2023-11-23 DIAGNOSIS — Z95.5 PRESENCE OF CORONARY ANGIOPLASTY IMPLANT AND GRAFT: Chronic | ICD-10-CM

## 2023-11-23 DIAGNOSIS — Z98.89 OTHER SPECIFIED POSTPROCEDURAL STATES: Chronic | ICD-10-CM

## 2023-11-23 DIAGNOSIS — J96.01 ACUTE RESPIRATORY FAILURE WITH HYPOXIA: ICD-10-CM

## 2023-11-23 DIAGNOSIS — I50.9 HEART FAILURE, UNSPECIFIED: Chronic | ICD-10-CM

## 2023-11-23 LAB
ALBUMIN SERPL ELPH-MCNC: 3.2 G/DL — LOW (ref 3.3–5)
ALBUMIN SERPL ELPH-MCNC: 3.2 G/DL — LOW (ref 3.3–5)
ALBUMIN SERPL ELPH-MCNC: 3.4 G/DL — SIGNIFICANT CHANGE UP (ref 3.3–5)
ALBUMIN SERPL ELPH-MCNC: 3.4 G/DL — SIGNIFICANT CHANGE UP (ref 3.3–5)
ALP SERPL-CCNC: 87 U/L — SIGNIFICANT CHANGE UP (ref 40–120)
ALT FLD-CCNC: 13 U/L — SIGNIFICANT CHANGE UP (ref 10–45)
ALT FLD-CCNC: 13 U/L — SIGNIFICANT CHANGE UP (ref 10–45)
ALT FLD-CCNC: 15 U/L — SIGNIFICANT CHANGE UP (ref 10–45)
ALT FLD-CCNC: 15 U/L — SIGNIFICANT CHANGE UP (ref 10–45)
ANION GAP SERPL CALC-SCNC: 17 MMOL/L — SIGNIFICANT CHANGE UP (ref 5–17)
ANION GAP SERPL CALC-SCNC: 17 MMOL/L — SIGNIFICANT CHANGE UP (ref 5–17)
ANION GAP SERPL CALC-SCNC: 20 MMOL/L — HIGH (ref 5–17)
ANION GAP SERPL CALC-SCNC: 20 MMOL/L — HIGH (ref 5–17)
APPEARANCE UR: CLEAR — SIGNIFICANT CHANGE UP
APPEARANCE UR: CLEAR — SIGNIFICANT CHANGE UP
APTT BLD: 33.6 SEC — SIGNIFICANT CHANGE UP (ref 24.5–35.6)
APTT BLD: 33.6 SEC — SIGNIFICANT CHANGE UP (ref 24.5–35.6)
AST SERPL-CCNC: 17 U/L — SIGNIFICANT CHANGE UP (ref 10–40)
AST SERPL-CCNC: 17 U/L — SIGNIFICANT CHANGE UP (ref 10–40)
AST SERPL-CCNC: 43 U/L — HIGH (ref 10–40)
AST SERPL-CCNC: 43 U/L — HIGH (ref 10–40)
BACTERIA # UR AUTO: NEGATIVE /HPF — SIGNIFICANT CHANGE UP
BACTERIA # UR AUTO: NEGATIVE /HPF — SIGNIFICANT CHANGE UP
BASE EXCESS BLDV CALC-SCNC: -3.1 MMOL/L — LOW (ref -2–3)
BASE EXCESS BLDV CALC-SCNC: -3.1 MMOL/L — LOW (ref -2–3)
BASOPHILS # BLD AUTO: 0.04 K/UL — SIGNIFICANT CHANGE UP (ref 0–0.2)
BASOPHILS # BLD AUTO: 0.04 K/UL — SIGNIFICANT CHANGE UP (ref 0–0.2)
BASOPHILS NFR BLD AUTO: 0.4 % — SIGNIFICANT CHANGE UP (ref 0–2)
BASOPHILS NFR BLD AUTO: 0.4 % — SIGNIFICANT CHANGE UP (ref 0–2)
BILIRUB SERPL-MCNC: 0.3 MG/DL — SIGNIFICANT CHANGE UP (ref 0.2–1.2)
BILIRUB SERPL-MCNC: 0.3 MG/DL — SIGNIFICANT CHANGE UP (ref 0.2–1.2)
BILIRUB SERPL-MCNC: 0.4 MG/DL — SIGNIFICANT CHANGE UP (ref 0.2–1.2)
BILIRUB SERPL-MCNC: 0.4 MG/DL — SIGNIFICANT CHANGE UP (ref 0.2–1.2)
BILIRUB UR-MCNC: NEGATIVE — SIGNIFICANT CHANGE UP
BILIRUB UR-MCNC: NEGATIVE — SIGNIFICANT CHANGE UP
BUN SERPL-MCNC: 65 MG/DL — HIGH (ref 7–23)
BUN SERPL-MCNC: 65 MG/DL — HIGH (ref 7–23)
BUN SERPL-MCNC: 67 MG/DL — HIGH (ref 7–23)
BUN SERPL-MCNC: 67 MG/DL — HIGH (ref 7–23)
CA-I SERPL-SCNC: 0.94 MMOL/L — LOW (ref 1.15–1.33)
CA-I SERPL-SCNC: 0.94 MMOL/L — LOW (ref 1.15–1.33)
CALCIUM SERPL-MCNC: 7.9 MG/DL — LOW (ref 8.4–10.5)
CALCIUM SERPL-MCNC: 7.9 MG/DL — LOW (ref 8.4–10.5)
CALCIUM SERPL-MCNC: 8 MG/DL — LOW (ref 8.4–10.5)
CALCIUM SERPL-MCNC: 8 MG/DL — LOW (ref 8.4–10.5)
CAST: 4 /LPF — SIGNIFICANT CHANGE UP (ref 0–4)
CAST: 4 /LPF — SIGNIFICANT CHANGE UP (ref 0–4)
CHLORIDE BLDV-SCNC: 100 MMOL/L — SIGNIFICANT CHANGE UP (ref 96–108)
CHLORIDE BLDV-SCNC: 100 MMOL/L — SIGNIFICANT CHANGE UP (ref 96–108)
CHLORIDE SERPL-SCNC: 97 MMOL/L — SIGNIFICANT CHANGE UP (ref 96–108)
CO2 BLDV-SCNC: 22 MMOL/L — SIGNIFICANT CHANGE UP (ref 22–26)
CO2 BLDV-SCNC: 22 MMOL/L — SIGNIFICANT CHANGE UP (ref 22–26)
CO2 SERPL-SCNC: 19 MMOL/L — LOW (ref 22–31)
CO2 SERPL-SCNC: 19 MMOL/L — LOW (ref 22–31)
CO2 SERPL-SCNC: 21 MMOL/L — LOW (ref 22–31)
CO2 SERPL-SCNC: 21 MMOL/L — LOW (ref 22–31)
COLOR SPEC: YELLOW — SIGNIFICANT CHANGE UP
COLOR SPEC: YELLOW — SIGNIFICANT CHANGE UP
CREAT SERPL-MCNC: 7.51 MG/DL — HIGH (ref 0.5–1.3)
CREAT SERPL-MCNC: 7.51 MG/DL — HIGH (ref 0.5–1.3)
CREAT SERPL-MCNC: 7.94 MG/DL — HIGH (ref 0.5–1.3)
CREAT SERPL-MCNC: 7.94 MG/DL — HIGH (ref 0.5–1.3)
DIFF PNL FLD: ABNORMAL
DIFF PNL FLD: ABNORMAL
EGFR: 7 ML/MIN/1.73M2 — LOW
EGFR: 7 ML/MIN/1.73M2 — LOW
EGFR: 8 ML/MIN/1.73M2 — LOW
EGFR: 8 ML/MIN/1.73M2 — LOW
EOSINOPHIL # BLD AUTO: 0.25 K/UL — SIGNIFICANT CHANGE UP (ref 0–0.5)
EOSINOPHIL # BLD AUTO: 0.25 K/UL — SIGNIFICANT CHANGE UP (ref 0–0.5)
EOSINOPHIL NFR BLD AUTO: 2.7 % — SIGNIFICANT CHANGE UP (ref 0–6)
EOSINOPHIL NFR BLD AUTO: 2.7 % — SIGNIFICANT CHANGE UP (ref 0–6)
FLUAV AG NPH QL: SIGNIFICANT CHANGE UP
FLUAV AG NPH QL: SIGNIFICANT CHANGE UP
FLUBV AG NPH QL: SIGNIFICANT CHANGE UP
FLUBV AG NPH QL: SIGNIFICANT CHANGE UP
GAS PNL BLDV: 130 MMOL/L — LOW (ref 136–145)
GAS PNL BLDV: 130 MMOL/L — LOW (ref 136–145)
GAS PNL BLDV: SIGNIFICANT CHANGE UP
GAS PNL BLDV: SIGNIFICANT CHANGE UP
GLUCOSE BLDV-MCNC: 350 MG/DL — HIGH (ref 70–99)
GLUCOSE BLDV-MCNC: 350 MG/DL — HIGH (ref 70–99)
GLUCOSE SERPL-MCNC: 342 MG/DL — HIGH (ref 70–99)
GLUCOSE SERPL-MCNC: 342 MG/DL — HIGH (ref 70–99)
GLUCOSE SERPL-MCNC: 352 MG/DL — HIGH (ref 70–99)
GLUCOSE SERPL-MCNC: 352 MG/DL — HIGH (ref 70–99)
GLUCOSE UR QL: >=1000 MG/DL
GLUCOSE UR QL: >=1000 MG/DL
HCO3 BLDV-SCNC: 21 MMOL/L — LOW (ref 22–29)
HCO3 BLDV-SCNC: 21 MMOL/L — LOW (ref 22–29)
HCT VFR BLD CALC: 31.9 % — LOW (ref 39–50)
HCT VFR BLD CALC: 31.9 % — LOW (ref 39–50)
HCT VFR BLDA CALC: 34 % — LOW (ref 39–51)
HCT VFR BLDA CALC: 34 % — LOW (ref 39–51)
HGB BLD CALC-MCNC: 11.3 G/DL — LOW (ref 12.6–17.4)
HGB BLD CALC-MCNC: 11.3 G/DL — LOW (ref 12.6–17.4)
HGB BLD-MCNC: 10.7 G/DL — LOW (ref 13–17)
HGB BLD-MCNC: 10.7 G/DL — LOW (ref 13–17)
IMM GRANULOCYTES NFR BLD AUTO: 0.7 % — SIGNIFICANT CHANGE UP (ref 0–0.9)
IMM GRANULOCYTES NFR BLD AUTO: 0.7 % — SIGNIFICANT CHANGE UP (ref 0–0.9)
INR BLD: 1.02 RATIO — SIGNIFICANT CHANGE UP (ref 0.85–1.18)
INR BLD: 1.02 RATIO — SIGNIFICANT CHANGE UP (ref 0.85–1.18)
KETONES UR-MCNC: ABNORMAL MG/DL
KETONES UR-MCNC: ABNORMAL MG/DL
LACTATE BLDV-MCNC: 1.2 MMOL/L — SIGNIFICANT CHANGE UP (ref 0.5–2)
LACTATE BLDV-MCNC: 1.2 MMOL/L — SIGNIFICANT CHANGE UP (ref 0.5–2)
LEUKOCYTE ESTERASE UR-ACNC: NEGATIVE — SIGNIFICANT CHANGE UP
LEUKOCYTE ESTERASE UR-ACNC: NEGATIVE — SIGNIFICANT CHANGE UP
LYMPHOCYTES # BLD AUTO: 0.83 K/UL — LOW (ref 1–3.3)
LYMPHOCYTES # BLD AUTO: 0.83 K/UL — LOW (ref 1–3.3)
LYMPHOCYTES # BLD AUTO: 9 % — LOW (ref 13–44)
LYMPHOCYTES # BLD AUTO: 9 % — LOW (ref 13–44)
MAGNESIUM SERPL-MCNC: 2.4 MG/DL — SIGNIFICANT CHANGE UP (ref 1.6–2.6)
MAGNESIUM SERPL-MCNC: 2.4 MG/DL — SIGNIFICANT CHANGE UP (ref 1.6–2.6)
MCHC RBC-ENTMCNC: 32.8 PG — SIGNIFICANT CHANGE UP (ref 27–34)
MCHC RBC-ENTMCNC: 32.8 PG — SIGNIFICANT CHANGE UP (ref 27–34)
MCHC RBC-ENTMCNC: 33.5 GM/DL — SIGNIFICANT CHANGE UP (ref 32–36)
MCHC RBC-ENTMCNC: 33.5 GM/DL — SIGNIFICANT CHANGE UP (ref 32–36)
MCV RBC AUTO: 97.9 FL — SIGNIFICANT CHANGE UP (ref 80–100)
MCV RBC AUTO: 97.9 FL — SIGNIFICANT CHANGE UP (ref 80–100)
MONOCYTES # BLD AUTO: 0.56 K/UL — SIGNIFICANT CHANGE UP (ref 0–0.9)
MONOCYTES # BLD AUTO: 0.56 K/UL — SIGNIFICANT CHANGE UP (ref 0–0.9)
MONOCYTES NFR BLD AUTO: 6.1 % — SIGNIFICANT CHANGE UP (ref 2–14)
MONOCYTES NFR BLD AUTO: 6.1 % — SIGNIFICANT CHANGE UP (ref 2–14)
NEUTROPHILS # BLD AUTO: 7.48 K/UL — HIGH (ref 1.8–7.4)
NEUTROPHILS # BLD AUTO: 7.48 K/UL — HIGH (ref 1.8–7.4)
NEUTROPHILS NFR BLD AUTO: 81.1 % — HIGH (ref 43–77)
NEUTROPHILS NFR BLD AUTO: 81.1 % — HIGH (ref 43–77)
NITRITE UR-MCNC: NEGATIVE — SIGNIFICANT CHANGE UP
NITRITE UR-MCNC: NEGATIVE — SIGNIFICANT CHANGE UP
NRBC # BLD: 0 /100 WBCS — SIGNIFICANT CHANGE UP (ref 0–0)
NRBC # BLD: 0 /100 WBCS — SIGNIFICANT CHANGE UP (ref 0–0)
PCO2 BLDV: 33 MMHG — LOW (ref 42–55)
PCO2 BLDV: 33 MMHG — LOW (ref 42–55)
PH BLDV: 7.41 — SIGNIFICANT CHANGE UP (ref 7.32–7.43)
PH BLDV: 7.41 — SIGNIFICANT CHANGE UP (ref 7.32–7.43)
PH UR: 6 — SIGNIFICANT CHANGE UP (ref 5–8)
PH UR: 6 — SIGNIFICANT CHANGE UP (ref 5–8)
PHOSPHATE SERPL-MCNC: 5.3 MG/DL — HIGH (ref 2.5–4.5)
PHOSPHATE SERPL-MCNC: 5.3 MG/DL — HIGH (ref 2.5–4.5)
PLATELET # BLD AUTO: 174 K/UL — SIGNIFICANT CHANGE UP (ref 150–400)
PLATELET # BLD AUTO: 174 K/UL — SIGNIFICANT CHANGE UP (ref 150–400)
PO2 BLDV: 63 MMHG — HIGH (ref 25–45)
PO2 BLDV: 63 MMHG — HIGH (ref 25–45)
POTASSIUM BLDV-SCNC: 6.5 MMOL/L — CRITICAL HIGH (ref 3.5–5.1)
POTASSIUM BLDV-SCNC: 6.5 MMOL/L — CRITICAL HIGH (ref 3.5–5.1)
POTASSIUM SERPL-MCNC: 4.5 MMOL/L — SIGNIFICANT CHANGE UP (ref 3.5–5.3)
POTASSIUM SERPL-MCNC: 4.5 MMOL/L — SIGNIFICANT CHANGE UP (ref 3.5–5.3)
POTASSIUM SERPL-MCNC: 6.1 MMOL/L — HIGH (ref 3.5–5.3)
POTASSIUM SERPL-MCNC: 6.1 MMOL/L — HIGH (ref 3.5–5.3)
POTASSIUM SERPL-SCNC: 4.5 MMOL/L — SIGNIFICANT CHANGE UP (ref 3.5–5.3)
POTASSIUM SERPL-SCNC: 4.5 MMOL/L — SIGNIFICANT CHANGE UP (ref 3.5–5.3)
POTASSIUM SERPL-SCNC: 6.1 MMOL/L — HIGH (ref 3.5–5.3)
POTASSIUM SERPL-SCNC: 6.1 MMOL/L — HIGH (ref 3.5–5.3)
PROT SERPL-MCNC: 6.6 G/DL — SIGNIFICANT CHANGE UP (ref 6–8.3)
PROT SERPL-MCNC: 6.6 G/DL — SIGNIFICANT CHANGE UP (ref 6–8.3)
PROT SERPL-MCNC: 6.9 G/DL — SIGNIFICANT CHANGE UP (ref 6–8.3)
PROT SERPL-MCNC: 6.9 G/DL — SIGNIFICANT CHANGE UP (ref 6–8.3)
PROT UR-MCNC: >=1000 MG/DL
PROT UR-MCNC: >=1000 MG/DL
PROTHROM AB SERPL-ACNC: 11.2 SEC — SIGNIFICANT CHANGE UP (ref 9.5–13)
PROTHROM AB SERPL-ACNC: 11.2 SEC — SIGNIFICANT CHANGE UP (ref 9.5–13)
RBC # BLD: 3.26 M/UL — LOW (ref 4.2–5.8)
RBC # BLD: 3.26 M/UL — LOW (ref 4.2–5.8)
RBC # FLD: 13.1 % — SIGNIFICANT CHANGE UP (ref 10.3–14.5)
RBC # FLD: 13.1 % — SIGNIFICANT CHANGE UP (ref 10.3–14.5)
RBC CASTS # UR COMP ASSIST: 15 /HPF — HIGH (ref 0–4)
RBC CASTS # UR COMP ASSIST: 15 /HPF — HIGH (ref 0–4)
RSV RNA NPH QL NAA+NON-PROBE: DETECTED
RSV RNA NPH QL NAA+NON-PROBE: DETECTED
SAO2 % BLDV: 90.5 % — HIGH (ref 67–88)
SAO2 % BLDV: 90.5 % — HIGH (ref 67–88)
SARS-COV-2 RNA SPEC QL NAA+PROBE: SIGNIFICANT CHANGE UP
SARS-COV-2 RNA SPEC QL NAA+PROBE: SIGNIFICANT CHANGE UP
SODIUM SERPL-SCNC: 135 MMOL/L — SIGNIFICANT CHANGE UP (ref 135–145)
SODIUM SERPL-SCNC: 135 MMOL/L — SIGNIFICANT CHANGE UP (ref 135–145)
SODIUM SERPL-SCNC: 136 MMOL/L — SIGNIFICANT CHANGE UP (ref 135–145)
SODIUM SERPL-SCNC: 136 MMOL/L — SIGNIFICANT CHANGE UP (ref 135–145)
SP GR SPEC: 1.02 — SIGNIFICANT CHANGE UP (ref 1–1.03)
SP GR SPEC: 1.02 — SIGNIFICANT CHANGE UP (ref 1–1.03)
SQUAMOUS # UR AUTO: 2 /HPF — SIGNIFICANT CHANGE UP (ref 0–5)
SQUAMOUS # UR AUTO: 2 /HPF — SIGNIFICANT CHANGE UP (ref 0–5)
TROPONIN T, HIGH SENSITIVITY RESULT: 165 NG/L — HIGH (ref 0–51)
TROPONIN T, HIGH SENSITIVITY RESULT: 165 NG/L — HIGH (ref 0–51)
UROBILINOGEN FLD QL: 0.2 MG/DL — SIGNIFICANT CHANGE UP (ref 0.2–1)
UROBILINOGEN FLD QL: 0.2 MG/DL — SIGNIFICANT CHANGE UP (ref 0.2–1)
WBC # BLD: 9.22 K/UL — SIGNIFICANT CHANGE UP (ref 3.8–10.5)
WBC # BLD: 9.22 K/UL — SIGNIFICANT CHANGE UP (ref 3.8–10.5)
WBC # FLD AUTO: 9.22 K/UL — SIGNIFICANT CHANGE UP (ref 3.8–10.5)
WBC # FLD AUTO: 9.22 K/UL — SIGNIFICANT CHANGE UP (ref 3.8–10.5)
WBC UR QL: 0 /HPF — SIGNIFICANT CHANGE UP (ref 0–5)
WBC UR QL: 0 /HPF — SIGNIFICANT CHANGE UP (ref 0–5)

## 2023-11-23 PROCEDURE — 99291 CRITICAL CARE FIRST HOUR: CPT

## 2023-11-23 PROCEDURE — 71046 X-RAY EXAM CHEST 2 VIEWS: CPT | Mod: 26

## 2023-11-23 RX ORDER — INFLUENZA VIRUS VACCINE 15; 15; 15; 15 UG/.5ML; UG/.5ML; UG/.5ML; UG/.5ML
0.5 SUSPENSION INTRAMUSCULAR ONCE
Refills: 0 | Status: DISCONTINUED | OUTPATIENT
Start: 2023-11-23 | End: 2023-11-28

## 2023-11-23 RX ORDER — PIPERACILLIN AND TAZOBACTAM 4; .5 G/20ML; G/20ML
3.38 INJECTION, POWDER, LYOPHILIZED, FOR SOLUTION INTRAVENOUS ONCE
Refills: 0 | Status: COMPLETED | OUTPATIENT
Start: 2023-11-23 | End: 2023-11-23

## 2023-11-23 RX ORDER — SODIUM ZIRCONIUM CYCLOSILICATE 10 G/10G
10 POWDER, FOR SUSPENSION ORAL ONCE
Refills: 0 | Status: DISCONTINUED | OUTPATIENT
Start: 2023-11-23 | End: 2023-11-23

## 2023-11-23 RX ORDER — ACETAMINOPHEN 500 MG
975 TABLET ORAL ONCE
Refills: 0 | Status: COMPLETED | OUTPATIENT
Start: 2023-11-23 | End: 2023-11-23

## 2023-11-23 RX ADMIN — PIPERACILLIN AND TAZOBACTAM 200 GRAM(S): 4; .5 INJECTION, POWDER, LYOPHILIZED, FOR SOLUTION INTRAVENOUS at 19:46

## 2023-11-23 RX ADMIN — Medication 975 MILLIGRAM(S): at 18:41

## 2023-11-23 RX ADMIN — Medication 975 MILLIGRAM(S): at 20:27

## 2023-11-23 NOTE — ED ADULT NURSE NOTE - OBJECTIVE STATEMENT
64 y/o male with PMH of  ASHP, BPH, CAD, DM, CKD on dialysis M, W, F arrives to the ER complaining of fever. Pt reports having cough, throat pain, generalized weakness, fever/chills, shortness of breath for two days.  Pt reports last dialysis was on Monday.  On assessment pt is A&Ox3, speaking coherently, airway is patent, breathing spontaneously.  Pt placed on continuous cardiac monitor, Sinus tachycardia noted SPO2 88% on room air improved to 98% on 2L nasal cannula. febrile. Patient undressed and placed into gown, side rails up with bed locked and in lowest position for safety. call bell within reach. [place] provided. Comfort and safety provided. will continue to reassess.

## 2023-11-23 NOTE — ED ADULT NURSE NOTE - NSFALLUNIVINTERV_ED_ALL_ED
Bed/Stretcher in lowest position, wheels locked, appropriate side rails in place/Call bell, personal items and telephone in reach/Instruct patient to call for assistance before getting out of bed/chair/stretcher/Non-slip footwear applied when patient is off stretcher/[place] to call system/Physically safe environment - no spills, clutter or unnecessary equipment/Purposeful proactive rounding/Room/bathroom lighting operational, light cord in reach

## 2023-11-23 NOTE — ED PROVIDER NOTE - PHYSICAL EXAMINATION
GENERAL:  drowsy, would arouse to voice, quick to fall back asleep  EYES: Anicteric.   HENT: Moist mucous membranes.   RESP:  hypoxic, CTAB   CARDIOVASCULAR: RRR, no m/g/r  ABDOMEN: soft, distended, nontender to palpation  MSK: ROM grossly normal in all 4 extremities. No deformities  SKIN: warm and dry  NEUROLOGIC:  arousable, but drowsy.  Confused

## 2023-11-23 NOTE — ED PROVIDER NOTE - CARE PLAN
1 Principal Discharge DX:	Acute respiratory failure with hypoxia   Principal Discharge DX:	Acute respiratory failure with hypoxia  Secondary Diagnosis:	Fever  Secondary Diagnosis:	Hyperglycemia due to diabetes mellitus

## 2023-11-23 NOTE — PATIENT PROFILE ADULT - NSPROHMDIABETBLDGLCTARGET_GEN_A_NUR
Called son. States he got a call from [facility] Garduno  Pt has been in their rehab for the past 6 weeks or so   They are working on transitioning her back to the house   They did an assessment where she went back to the house to see what she could and couldn't do and was very clear that she is just not ready   The current plan would be to try to do a 28 day stay on the assisted living side where she would get a little less hands on from the staff than she does now but more than she would get at home  However, he got a call this weekend saying that she wont be able to do that because of her diabetes and her current readings and they had \"mentioned the term sliding scale\" and he is wondering why that would effect things  RN discussed sliding scale and how it is managed as son wasn't too familiar with it     He does have a meeting at 2pm this afternoon to discuss his mom's plan of care and is looking for a little more information regarding diabetic management prior to that    He states he does not feel his mom is capable of every really going home and if she does will end up back in the hospital     Can diet help with this sliding scale? Noted that at every meal, she has a big glass of juice that comes with it. Is that really something she should be having?    He is looking for ways to help get the care she needs but also keep her safe and healthy    Any guidance that Dr Chairez has to offer would be helpful especially in regards to the sliding scale as that seems to be whats keeping her from assisted living  Please advise    known

## 2023-11-23 NOTE — ED PROVIDER NOTE - PROGRESS NOTE DETAILS
Rajwinder Mercer) [facility] PGY-2:  patient appears fluid overloaded,  will contact nephrology for dialysis Glendy Cordero, PGY-2 DO:  Nephrology called, patient to receive dialysis in the morning, patient TBA.

## 2023-11-23 NOTE — PATIENT PROFILE ADULT - FALL HARM RISK - HARM RISK INTERVENTIONS

## 2023-11-23 NOTE — ED PROVIDER NOTE - OBJECTIVE STATEMENT
62yo M, hx of ESRD on HD MWF, CAD s/p stents, CHF, DM, Brought in by EMS for shortness of breath.  Patient reported not feeling well for the past 2 to 3 days with productive cough.  Per EMS febrile, Tmax 103.  On scene, patient was hypoxic, satting around 88 to 92% on room air.  This improved with fall liters nasal cannula.  Patient also has positive vomiting, however denies chest pain or shortness of breath at the moment.  per EMS, family states this is similar to his previous presentation for sepsis or pneumonia. History limited as patient is lethargic.  of note, full run of dialysis is on Monday.

## 2023-11-23 NOTE — ED PROVIDER NOTE - ATTENDING CONTRIBUTION TO CARE
------------ATTENDING NOTE------------  pt brought to ED by EMS after family called concerned about worsening dyspnea/sob, malaise/fatigue, complicated as pt on M/W/F HD and missed yesterday (last full session >72 hrs ago), no fevers, still makes urine, new hypoxia to 88% on room air improved w/ NC 2L, awaiting labs/imaging and close reassessments -->  - Marlon Carrillo MD   ------------------------------------------------ ------------ATTENDING NOTE------------  pt brought to ED by EMS after family called concerned about worsening dyspnea/sob, malaise/fatigue, complicated as pt on M/W/F HD and missed yesterday (last full session >72 hrs ago), no fevers, still makes urine, new hypoxia to 88% on room air improved w/ NC 2L, found to be febrile in ED, awaiting labs/imaging and close reassessments -->  - Marlon Carrillo MD   ------------------------------------------------

## 2023-11-23 NOTE — ED ADULT TRIAGE NOTE - CHIEF COMPLAINT QUOTE
General malaise, fever/chills, shortness of breath. Oxygen saturation 88% on room air improved to 98% on 2L nasal cannula.

## 2023-11-24 LAB
A1C WITH ESTIMATED AVERAGE GLUCOSE RESULT: 9.5 % — HIGH (ref 4–5.6)
A1C WITH ESTIMATED AVERAGE GLUCOSE RESULT: 9.5 % — HIGH (ref 4–5.6)
ESTIMATED AVERAGE GLUCOSE: 226 MG/DL — HIGH (ref 68–114)
ESTIMATED AVERAGE GLUCOSE: 226 MG/DL — HIGH (ref 68–114)
GLUCOSE BLDC GLUCOMTR-MCNC: 331 MG/DL — HIGH (ref 70–99)
GLUCOSE BLDC GLUCOMTR-MCNC: 331 MG/DL — HIGH (ref 70–99)
GLUCOSE BLDC GLUCOMTR-MCNC: 352 MG/DL — HIGH (ref 70–99)
GLUCOSE BLDC GLUCOMTR-MCNC: 352 MG/DL — HIGH (ref 70–99)
GLUCOSE BLDC GLUCOMTR-MCNC: 355 MG/DL — HIGH (ref 70–99)
GLUCOSE BLDC GLUCOMTR-MCNC: 355 MG/DL — HIGH (ref 70–99)
GLUCOSE BLDC GLUCOMTR-MCNC: 412 MG/DL — HIGH (ref 70–99)
GLUCOSE BLDC GLUCOMTR-MCNC: 412 MG/DL — HIGH (ref 70–99)
GLUCOSE BLDC GLUCOMTR-MCNC: 415 MG/DL — HIGH (ref 70–99)
GLUCOSE BLDC GLUCOMTR-MCNC: 415 MG/DL — HIGH (ref 70–99)
HBV SURFACE AG SER-ACNC: SIGNIFICANT CHANGE UP
HBV SURFACE AG SER-ACNC: SIGNIFICANT CHANGE UP
MRSA PCR RESULT.: SIGNIFICANT CHANGE UP
MRSA PCR RESULT.: SIGNIFICANT CHANGE UP
S AUREUS DNA NOSE QL NAA+PROBE: DETECTED
S AUREUS DNA NOSE QL NAA+PROBE: DETECTED

## 2023-11-24 PROCEDURE — 71250 CT THORAX DX C-: CPT | Mod: 26

## 2023-11-24 PROCEDURE — 99222 1ST HOSP IP/OBS MODERATE 55: CPT

## 2023-11-24 PROCEDURE — 99223 1ST HOSP IP/OBS HIGH 75: CPT

## 2023-11-24 RX ORDER — CARVEDILOL PHOSPHATE 80 MG/1
12.5 CAPSULE, EXTENDED RELEASE ORAL EVERY 12 HOURS
Refills: 0 | Status: DISCONTINUED | OUTPATIENT
Start: 2023-11-24 | End: 2023-11-28

## 2023-11-24 RX ORDER — INSULIN LISPRO 100/ML
5 VIAL (ML) SUBCUTANEOUS
Refills: 0 | Status: DISCONTINUED | OUTPATIENT
Start: 2023-11-24 | End: 2023-11-24

## 2023-11-24 RX ORDER — CLOPIDOGREL BISULFATE 75 MG/1
75 TABLET, FILM COATED ORAL DAILY
Refills: 0 | Status: DISCONTINUED | OUTPATIENT
Start: 2023-11-24 | End: 2023-11-24

## 2023-11-24 RX ORDER — CLOPIDOGREL BISULFATE 75 MG/1
75 TABLET, FILM COATED ORAL DAILY
Refills: 0 | Status: DISCONTINUED | OUTPATIENT
Start: 2023-11-24 | End: 2023-11-28

## 2023-11-24 RX ORDER — SEVELAMER CARBONATE 2400 MG/1
800 POWDER, FOR SUSPENSION ORAL THREE TIMES A DAY
Refills: 0 | Status: DISCONTINUED | OUTPATIENT
Start: 2023-11-24 | End: 2023-11-28

## 2023-11-24 RX ORDER — INSULIN GLARGINE 100 [IU]/ML
15 INJECTION, SOLUTION SUBCUTANEOUS AT BEDTIME
Refills: 0 | Status: DISCONTINUED | OUTPATIENT
Start: 2023-11-24 | End: 2023-11-27

## 2023-11-24 RX ORDER — SENNA PLUS 8.6 MG/1
2 TABLET ORAL AT BEDTIME
Refills: 0 | Status: DISCONTINUED | OUTPATIENT
Start: 2023-11-24 | End: 2023-11-28

## 2023-11-24 RX ORDER — IPRATROPIUM/ALBUTEROL SULFATE 18-103MCG
3 AEROSOL WITH ADAPTER (GRAM) INHALATION EVERY 6 HOURS
Refills: 0 | Status: DISCONTINUED | OUTPATIENT
Start: 2023-11-24 | End: 2023-11-28

## 2023-11-24 RX ORDER — INSULIN LISPRO 100/ML
VIAL (ML) SUBCUTANEOUS AT BEDTIME
Refills: 0 | Status: DISCONTINUED | OUTPATIENT
Start: 2023-11-24 | End: 2023-11-28

## 2023-11-24 RX ORDER — CHLORHEXIDINE GLUCONATE 213 G/1000ML
1 SOLUTION TOPICAL
Refills: 0 | Status: DISCONTINUED | OUTPATIENT
Start: 2023-11-24 | End: 2023-11-28

## 2023-11-24 RX ORDER — GLUCAGON INJECTION, SOLUTION 0.5 MG/.1ML
1 INJECTION, SOLUTION SUBCUTANEOUS ONCE
Refills: 0 | Status: DISCONTINUED | OUTPATIENT
Start: 2023-11-24 | End: 2023-11-28

## 2023-11-24 RX ORDER — ASPIRIN/CALCIUM CARB/MAGNESIUM 324 MG
81 TABLET ORAL DAILY
Refills: 0 | Status: DISCONTINUED | OUTPATIENT
Start: 2023-11-24 | End: 2023-11-28

## 2023-11-24 RX ORDER — INSULIN GLARGINE 100 [IU]/ML
12 INJECTION, SOLUTION SUBCUTANEOUS AT BEDTIME
Refills: 0 | Status: DISCONTINUED | OUTPATIENT
Start: 2023-11-24 | End: 2023-11-24

## 2023-11-24 RX ORDER — DEXTROSE 50 % IN WATER 50 %
15 SYRINGE (ML) INTRAVENOUS ONCE
Refills: 0 | Status: DISCONTINUED | OUTPATIENT
Start: 2023-11-24 | End: 2023-11-28

## 2023-11-24 RX ORDER — ASCORBIC ACID 60 MG
1 TABLET,CHEWABLE ORAL
Refills: 0 | DISCHARGE

## 2023-11-24 RX ORDER — SODIUM CHLORIDE 9 MG/ML
1000 INJECTION, SOLUTION INTRAVENOUS
Refills: 0 | Status: DISCONTINUED | OUTPATIENT
Start: 2023-11-24 | End: 2023-11-28

## 2023-11-24 RX ORDER — ALBUTEROL 90 UG/1
2 AEROSOL, METERED ORAL EVERY 8 HOURS
Refills: 0 | Status: DISCONTINUED | OUTPATIENT
Start: 2023-11-24 | End: 2023-11-28

## 2023-11-24 RX ORDER — CEFTRIAXONE 500 MG/1
1000 INJECTION, POWDER, FOR SOLUTION INTRAMUSCULAR; INTRAVENOUS EVERY 24 HOURS
Refills: 0 | Status: DISCONTINUED | OUTPATIENT
Start: 2023-11-24 | End: 2023-11-25

## 2023-11-24 RX ORDER — DEXTROSE 50 % IN WATER 50 %
25 SYRINGE (ML) INTRAVENOUS ONCE
Refills: 0 | Status: DISCONTINUED | OUTPATIENT
Start: 2023-11-24 | End: 2023-11-28

## 2023-11-24 RX ORDER — DEXTROSE 50 % IN WATER 50 %
12.5 SYRINGE (ML) INTRAVENOUS ONCE
Refills: 0 | Status: DISCONTINUED | OUTPATIENT
Start: 2023-11-24 | End: 2023-11-28

## 2023-11-24 RX ORDER — ERYTHROPOIETIN 10000 [IU]/ML
4000 INJECTION, SOLUTION INTRAVENOUS; SUBCUTANEOUS ONCE
Refills: 0 | Status: COMPLETED | OUTPATIENT
Start: 2023-11-24 | End: 2023-11-24

## 2023-11-24 RX ORDER — ALLOPURINOL 300 MG
100 TABLET ORAL DAILY
Refills: 0 | Status: DISCONTINUED | OUTPATIENT
Start: 2023-11-24 | End: 2023-11-28

## 2023-11-24 RX ORDER — HEPARIN SODIUM 5000 [USP'U]/ML
5000 INJECTION INTRAVENOUS; SUBCUTANEOUS EVERY 12 HOURS
Refills: 0 | Status: DISCONTINUED | OUTPATIENT
Start: 2023-11-24 | End: 2023-11-28

## 2023-11-24 RX ORDER — INSULIN LISPRO 100/ML
VIAL (ML) SUBCUTANEOUS
Refills: 0 | Status: DISCONTINUED | OUTPATIENT
Start: 2023-11-24 | End: 2023-11-28

## 2023-11-24 RX ORDER — ATORVASTATIN CALCIUM 80 MG/1
80 TABLET, FILM COATED ORAL AT BEDTIME
Refills: 0 | Status: DISCONTINUED | OUTPATIENT
Start: 2023-11-24 | End: 2023-11-28

## 2023-11-24 RX ORDER — GABAPENTIN 400 MG/1
100 CAPSULE ORAL
Refills: 0 | Status: DISCONTINUED | OUTPATIENT
Start: 2023-11-24 | End: 2023-11-28

## 2023-11-24 RX ORDER — INSULIN LISPRO 100/ML
7 VIAL (ML) SUBCUTANEOUS
Refills: 0 | Status: DISCONTINUED | OUTPATIENT
Start: 2023-11-24 | End: 2023-11-27

## 2023-11-24 RX ORDER — VALSARTAN 80 MG/1
40 TABLET ORAL DAILY
Refills: 0 | Status: DISCONTINUED | OUTPATIENT
Start: 2023-11-24 | End: 2023-11-25

## 2023-11-24 RX ORDER — SERTRALINE 25 MG/1
25 TABLET, FILM COATED ORAL DAILY
Refills: 0 | Status: DISCONTINUED | OUTPATIENT
Start: 2023-11-24 | End: 2023-11-28

## 2023-11-24 RX ADMIN — SERTRALINE 25 MILLIGRAM(S): 25 TABLET, FILM COATED ORAL at 11:12

## 2023-11-24 RX ADMIN — Medication 3 MILLILITER(S): at 11:12

## 2023-11-24 RX ADMIN — Medication 81 MILLIGRAM(S): at 11:12

## 2023-11-24 RX ADMIN — Medication 7 UNIT(S): at 18:30

## 2023-11-24 RX ADMIN — Medication 100 MILLIGRAM(S): at 14:49

## 2023-11-24 RX ADMIN — INSULIN GLARGINE 15 UNIT(S): 100 INJECTION, SOLUTION SUBCUTANEOUS at 22:00

## 2023-11-24 RX ADMIN — Medication 6: at 11:12

## 2023-11-24 RX ADMIN — ERYTHROPOIETIN 4000 UNIT(S): 10000 INJECTION, SOLUTION INTRAVENOUS; SUBCUTANEOUS at 20:08

## 2023-11-24 RX ADMIN — SEVELAMER CARBONATE 800 MILLIGRAM(S): 2400 POWDER, FOR SUSPENSION ORAL at 14:49

## 2023-11-24 RX ADMIN — Medication 600 MILLIGRAM(S): at 18:30

## 2023-11-24 RX ADMIN — Medication 4: at 18:30

## 2023-11-24 RX ADMIN — Medication 100 MILLIGRAM(S): at 11:12

## 2023-11-24 RX ADMIN — Medication 600 MILLIGRAM(S): at 06:57

## 2023-11-24 RX ADMIN — CEFTRIAXONE 100 MILLIGRAM(S): 500 INJECTION, POWDER, FOR SOLUTION INTRAMUSCULAR; INTRAVENOUS at 09:28

## 2023-11-24 RX ADMIN — Medication 7 UNIT(S): at 11:13

## 2023-11-24 RX ADMIN — CLOPIDOGREL BISULFATE 75 MILLIGRAM(S): 75 TABLET, FILM COATED ORAL at 14:49

## 2023-11-24 RX ADMIN — Medication 100 MILLIGRAM(S): at 06:56

## 2023-11-24 RX ADMIN — GABAPENTIN 100 MILLIGRAM(S): 400 CAPSULE ORAL at 18:30

## 2023-11-24 RX ADMIN — HEPARIN SODIUM 5000 UNIT(S): 5000 INJECTION INTRAVENOUS; SUBCUTANEOUS at 18:31

## 2023-11-24 NOTE — CONSULT NOTE ADULT - SUBJECTIVE AND OBJECTIVE BOX
NEPHROLOGY - NSN    Patient seen and examined.    HPI:      PAST MEDICAL & SURGICAL HISTORY:  Diabetes Mellitus Type II, Uncontrolled      Dyslipidemia      s/p Angioplasty with Stent      HTN (hypertension)      Gout      Diabetic retinopathy      GERD (gastroesophageal reflux disease)      Nephrolithiasis      Vitamin D deficiency      Hepatitis      CKD (chronic kidney disease)      Ischemic cardiomyopathy      ASHD (arteriosclerotic heart disease)      Pulmonary hypertension      Myocardial infarction      CAD (coronary artery disease)      BPH (benign prostatic hyperplasia)      Retinal Hemorrhage      S/P coronary artery stent placement   TRICIA to Diag ; 2010  LAD; 11 ATOM liberte Diag; 5/15/2017  TRICIA to  diag; 17 PCI with laser and high pressure balloon      History of eye surgery  left eye      H/O lithotripsy      Diabetes with retinopathy  S/P PPV/PRP/GAS  OD 17 for viterous hemorrhage      CHF with cardiomyopathy  Insertion of Cardiomems monitor      Stented coronary artery          MEDICATIONS  (STANDING):  albuterol    90 MICROgram(s) HFA Inhaler 2 Puff(s) Inhalation every 8 hours  albuterol/ipratropium for Nebulization 3 milliLiter(s) Nebulizer every 6 hours  allopurinol 100 milliGRAM(s) Oral daily  aspirin  chewable 81 milliGRAM(s) Oral daily  atorvastatin 80 milliGRAM(s) Oral at bedtime  benzonatate 100 milliGRAM(s) Oral three times a day  carvedilol 12.5 milliGRAM(s) Oral every 12 hours  cefTRIAXone   IVPB 1000 milliGRAM(s) IV Intermittent every 24 hours  dextrose 5%. 1000 milliLiter(s) (100 mL/Hr) IV Continuous <Continuous>  dextrose 5%. 1000 milliLiter(s) (50 mL/Hr) IV Continuous <Continuous>  dextrose 50% Injectable 25 Gram(s) IV Push once  dextrose 50% Injectable 25 Gram(s) IV Push once  dextrose 50% Injectable 12.5 Gram(s) IV Push once  gabapentin 100 milliGRAM(s) Oral two times a day  glucagon  Injectable 1 milliGRAM(s) IntraMuscular once  guaiFENesin  milliGRAM(s) Oral every 12 hours  heparin   Injectable 5000 Unit(s) SubCutaneous every 12 hours  influenza   Vaccine 0.5 milliLiter(s) IntraMuscular once  insulin glargine Injectable (LANTUS) 15 Unit(s) SubCutaneous at bedtime  insulin lispro (ADMELOG) corrective regimen sliding scale   SubCutaneous three times a day before meals  insulin lispro Injectable (ADMELOG) 7 Unit(s) SubCutaneous three times a day before meals  senna 2 Tablet(s) Oral at bedtime  sertraline 25 milliGRAM(s) Oral daily  sevelamer carbonate 800 milliGRAM(s) Oral three times a day  valsartan 40 milliGRAM(s) Oral daily      Allergies    shellfish (Unknown)  No Known Drug Allergies    Intolerances        SOCIAL HISTORY:  Denies alcohol abuse, drug abuse or tobacco usage.     FAMILY HISTORY:  Family history of cardiac disorder in father (Father)        VITALS:  T(C): 37.6 (23 @ 04:00), Max: 39.1 (23 @ 18:02)  HR: 108 (23:00) (80 - 114)  BP: 140/90 (23:00) (133/61 - 168/70)  RR: 18 (23:00) (18 - 21)  SpO2: 98% (23:00) (96% - 98%)    REVIEW OF SYSTEMS:  Denies any nausea, vomiting, diarrhea, fever or chills. Denies chest pain, SOB, focal weakness, hematuria or dysuria. Good oral intake and denies fatigue or weakness. All other pertinent systems are reviewed and are negative.    PHYSICAL EXAM:  Constitutional: NAD  HEENT: EOMI  Neck:  No JVD, supple   Respiratory: CTA B/L  Cardiovascular: S1 and S2, RRR  Gastrointestinal: + BS, soft, NT, ND  Extremities: No peripheral edema, + peripheral pulses  Neurological: A/O x 3, CN2-12 intact  Psychiatric: Normal mood, normal affect  : No Jay  Skin: No rashes, C/D/I  Access: Not applicable    I and O's:     @ 07:01  -   @ 07:00  --------------------------------------------------------  IN: 0 mL / OUT: 200 mL / NET: -200 mL      Height (cm): 170.2 ( 18:02)  Weight (kg): 79.4 (11-23 @ 18:02)  BMI (kg/m2): 27.4 ( @ 18:02)  BSA (m2): 1.91 ( @ 18:02)    LABS:                        10.7   9.22  )-----------( 174      ( 2023 18:24 )             31.9         135  |  97  |  67<H>  ----------------------------<  342<H>  4.5   |  21<L>  |  7.94<H>    Ca    7.9<L>      2023 19:48  Phos  5.3       Mg     2.4         TPro  6.6  /  Alb  3.4  /  TBili  0.3  /  DBili  x   /  AST  17  /  ALT  13  /  AlkPhos  87        URINE:  Urinalysis Basic - ( 2023 19:56 )    Color: Yellow / Appearance: Clear / S.023 / pH: x  Gluc: x / Ketone: Trace mg/dL  / Bili: Negative / Urobili: 0.2 mg/dL   Blood: x / Protein: >=1000 mg/dL / Nitrite: Negative   Leuk Esterase: Negative / RBC: 15 /HPF / WBC 0 /HPF   Sq Epi: x / Non Sq Epi: 2 /HPF / Bacteria: Negative /HPF        RADIOLOGY & ADDITIONAL STUDIES:     NEPHROLOGY - NSN    Patient seen and examined.    HPI:  64yo M, hx of ESRD on HD MWF, CAD s/p stents, CHF, DM, Brought in by EMS for shortness of breath.  Patient reported not feeling well for the past 2 to 3 days with productive cough.  Per EMS febrile, Tmax 103.  On scene, patient was hypoxic, satting around 88 to 92% on room air.  This improved with fall liters nasal cannula.  Patient also has positive vomiting, however denies chest pain or shortness of breath at the moment.  per EMS, family states this is similar to his previous presentation for sepsis or pneumonia. History limited as patient is lethargic.  of note, full run of dialysis is on Monday.  + RSV was noted   There is no hematuria or bubbles in the urine.  No history of NSAIDS or nephrolithisis.  The patient urinates once or twice in the night and there is no incontinence.  No family hx or renal disease or back pain.    No recent abx use.  No alleviating or aggravating factors with respect to the kidneys.     PAST MEDICAL & SURGICAL HISTORY:  Diabetes Mellitus Type II, Uncontrolled      Dyslipidemia      s/p Angioplasty with Stent      HTN (hypertension)      Gout      Diabetic retinopathy      GERD (gastroesophageal reflux disease)      Nephrolithiasis      Vitamin D deficiency      Hepatitis      CKD (chronic kidney disease)      Ischemic cardiomyopathy      ASHD (arteriosclerotic heart disease)      Pulmonary hypertension      Myocardial infarction      CAD (coronary artery disease)      BPH (benign prostatic hyperplasia)      Retinal Hemorrhage      S/P coronary artery stent placement   TRICIA to Diag ; 2010  LAD; 11 ATOM liberte Diag; 5/15/2017  TRICIA to  diag; 17 PCI with laser and high pressure balloon      History of eye surgery  left eye      H/O lithotripsy      Diabetes with retinopathy  S/P PPV/PRP/GAS  OD 17 for viterous hemorrhage      CHF with cardiomyopathy  Insertion of Cardiomems monitor      Stented coronary artery          MEDICATIONS  (STANDING):  albuterol    90 MICROgram(s) HFA Inhaler 2 Puff(s) Inhalation every 8 hours  albuterol/ipratropium for Nebulization 3 milliLiter(s) Nebulizer every 6 hours  allopurinol 100 milliGRAM(s) Oral daily  aspirin  chewable 81 milliGRAM(s) Oral daily  atorvastatin 80 milliGRAM(s) Oral at bedtime  benzonatate 100 milliGRAM(s) Oral three times a day  carvedilol 12.5 milliGRAM(s) Oral every 12 hours  cefTRIAXone   IVPB 1000 milliGRAM(s) IV Intermittent every 24 hours  dextrose 5%. 1000 milliLiter(s) (100 mL/Hr) IV Continuous <Continuous>  dextrose 5%. 1000 milliLiter(s) (50 mL/Hr) IV Continuous <Continuous>  dextrose 50% Injectable 25 Gram(s) IV Push once  dextrose 50% Injectable 25 Gram(s) IV Push once  dextrose 50% Injectable 12.5 Gram(s) IV Push once  gabapentin 100 milliGRAM(s) Oral two times a day  glucagon  Injectable 1 milliGRAM(s) IntraMuscular once  guaiFENesin  milliGRAM(s) Oral every 12 hours  heparin   Injectable 5000 Unit(s) SubCutaneous every 12 hours  influenza   Vaccine 0.5 milliLiter(s) IntraMuscular once  insulin glargine Injectable (LANTUS) 15 Unit(s) SubCutaneous at bedtime  insulin lispro (ADMELOG) corrective regimen sliding scale   SubCutaneous three times a day before meals  insulin lispro Injectable (ADMELOG) 7 Unit(s) SubCutaneous three times a day before meals  senna 2 Tablet(s) Oral at bedtime  sertraline 25 milliGRAM(s) Oral daily  sevelamer carbonate 800 milliGRAM(s) Oral three times a day  valsartan 40 milliGRAM(s) Oral daily      Allergies    shellfish (Unknown)  No Known Drug Allergies    Intolerances        SOCIAL HISTORY:  Denies alcohol abuse, drug abuse or tobacco usage.     FAMILY HISTORY:  Family history of cardiac disorder in father (Father)        VITALS:  T(C): 37.6 (23 @ 04:00), Max: 39.1 (23 @ 18:02)  HR: 108 (23 @ 04:00) (80 - 114)  BP: 140/90 (23 @ 04:00) (133/61 - 168/70)  RR: 18 (23 @ 04:00) (18 - 21)  SpO2: 98% (23 @ 04:00) (96% - 98%)    REVIEW OF SYSTEMS:  D Denies chest pain, SOB, focal weakness, hematuria or dysuria. Good oral intake and denies fatigue or weakness. All other pertinent systems are reviewed and are negative.    PHYSICAL EXAM:  Constitutional: NAD  HEENT: EOMI  Neck:  No JVD, supple   Respiratory: CTA B/L  Cardiovascular: S1 and S2, RRR  Gastrointestinal: + BS, soft, NT, ND  Extremities: No peripheral edema, + peripheral pulses  Neurological: A/O x 3, CN2-12 intact  Psychiatric: Normal mood, normal affect  : No Jay  Skin: No rashes, C/D/I  Access: avf    I and O's:     @ 07:01  -   @ 07:00  --------------------------------------------------------  IN: 0 mL / OUT: 200 mL / NET: -200 mL      Height (cm): 170.2 ( @ 18:02)  Weight (kg): 79.4 ( @ 18:02)  BMI (kg/m2): 27.4 ( @ 18:02)  BSA (m2): 1.91 ( @ 18:02)    LABS:                        10.7   9.22  )-----------( 174      ( 2023 18:24 )             31.9         135  |  97  |  67<H>  ----------------------------<  342<H>  4.5   |  21<L>  |  7.94<H>    Ca    7.9<L>      2023 19:48  Phos  5.3       Mg     2.4         TPro  6.6  /  Alb  3.4  /  TBili  0.3  /  DBili  x   /  AST  17  /  ALT  13  /  AlkPhos  87        URINE:  Urinalysis Basic - ( 2023 19:56 )    Color: Yellow / Appearance: Clear / S.023 / pH: x  Gluc: x / Ketone: Trace mg/dL  / Bili: Negative / Urobili: 0.2 mg/dL   Blood: x / Protein: >=1000 mg/dL / Nitrite: Negative   Leuk Esterase: Negative / RBC: 15 /HPF / WBC 0 /HPF   Sq Epi: x / Non Sq Epi: 2 /HPF / Bacteria: Negative /HPF        RADIOLOGY & ADDITIONAL STUDIES:    < from: CT Chest No Cont (23 @ 08:53) >    ACC: [id number] EXAM:  CT CHEST   ORDERED BY: OJ KARIMI     PROCEDURE DATE:  2023          INTERPRETATION:  CLINICAL INFORMATION: Pneumonia    TECHNIQUE: A volumetric CT acquisition of the chest was obtained from the   thoracic inlet to the upper abdomen, without administration of   intravenous contrast. This CT scan was performed with one or more of the   following dose optimization: iterative reconstruction, automatic exposure   control, and/or manual adjustment of mAs and kVp according to the   patient's size. 3D MIP and volume-rendered images were created at the   scanner.    COMPARISON: The study was compared to CT chest dated 2023    FINDINGS:  Lines and Tubes: None    Mediastinum: The thyroid and thoracic inlet are normal. There is no   enlarged axiliary, mediastinal, or hilar lymph nodes. The heart size is   within normal limits. There is small pericardial effusion. The aorta is   normal in course and caliber, with scattered calcified atheromas. There   are atherosclerotic calcification of coronary vessels and aortic valve   annulus. Coronary stents are noted. There is a small hiatal hernia with   concentric thickening of distal esophagus, suggestive of chronic   gastritis.    Lung/pleura: The central tracheobronchial tree is patent. There is trace   left and small right pleural effusion with interval decrease in size.   There are peribronchial vascular foci of groundglass opacity and   centrilobular nodules in all 5 lobes. These nodules are more   consolidative in right middle lobe.    Abdomen: Bilateral kidneys are atrophic. There is biliary sludge layering   in dependent portion of the gallbladder.Limited evaluation of liver,   spleen, pancreas, right adrenal gland, and left adrenal gland is   unremarkable.    Bone and Soft Tissue: There is a focus of lucency in the T5 vertebral   body, unchanged. There are multilevel degenerative changes of thoracic   spine , with disk space narrowing and osteophytosis. The soft tissue is   grossly normal.    IMPRESSION:  Bibasilar pleural effusion and peribronchovascular foci of groundglass   opacity and centrilobular nodules involving all 5 lobes which are more   consolidative in the right middle lobe. Clinical findings favors   endobronchial spread of infection over aspiration pneumonitis    --- End of Report ---

## 2023-11-24 NOTE — CONSULT NOTE ADULT - ASSESSMENT
62yo M, hx of ESRD on HD MWF, CAD s/p stents, CHF, DM, Brought in by EMS for shortness of breath and found to have + RSV    1 Renal - HD today and remove 2.5 kg provided the BP does not drop   2 Anemia - Epogen 4000 unit at hd   3 ID-IV abx     Sayed [facility]   [phone]

## 2023-11-24 NOTE — CONSULT NOTE ADULT - ASSESSMENT
Patient is a 63 M with history of ESRD on HD, CAD s/p stent, CHF, DM here for SOB, found to have RSV infection. Endocrinology consulted for diabetes management.   Patient is high risk with high level decision making due to uncontrolled diabetes which places patient at high risk for cardiovascular and cerebrovascular events. Patient with lability of glucose requiring close monitoring and insulin adjustments.    # T2DM   - Most recent Hemoglobin A1C 9.5  - Home DM med: toujeo 20 units QHS, humalog 10 unots  - Current FS ranges from 300-400  - Current diet:renal/CHO  - Please monitor blood glucose values TID AC & QHS while eating regular meals and Q6H while NPO  - Blood glucose goals pre-meal less than 140 mg/dL and random blood glucose less than 180 mg/dL  - Recommendations:  - Glucose currently elevated  - Start insulin Glargine 15 units QHS  - Start insulin Lispro 7 units TID with meals, hold if NPO or if eating less than 50% of meals  - Continue with low dose correctional scale TID with meals  - Continue with low dose correctional scale QHS    Discharge planning:   - Home DM medications:  toujeo 20 units QHS, humalog 10 unots  - Discharge DM medications:  toujeo and humalog, dose TBD   - Patient will follow up with Dr. Hernandez  at   Endocrinology [facility]:  [address] [place] [address]. [address]. [place] [address]  Tel: [phone]  - Patient will need opthalmology and podiatry follow up as outpatient     # HTN  - BP goal 130/80  - Manage per primary team     # HLD  - Continue with atovastatin 80 mg QHS  - Goal LDL<70  - Manage as outpatient         Thank you for the consult. Will continue to monitor. Please inform the endocrinology team at least 24 hours prior to intended discharge date.     Contact via pager or [employer] Teams during business hours. For follow up questions, discharge recommendations, or new consults please call answering service at [phone] (weekdays), [phone] (nights/weekends). For nonurgent matters, please email  [email or web address].     Rebeka Gonzalez MD  Department of Endocrinology, Diabetes and metabolism   Pager [phone]

## 2023-11-24 NOTE — H&P ADULT - HISTORY OF PRESENT ILLNESS
Date of Service: 11-24-23 @ 10:44           CARDIOLOGY    H & P  NOTE   ________________________________________________    CHIEF COMPLAINT:Patient is a 63y old  Male who presents with a chief complaint of SOB (24 Nov 2023 09:11)  64yo M, hx of ESRD on HD MWF, CAD s/p stents, CHF, DM, Brought in by EMS for shortness of breath.  Patient reported not feeling well for the past 2 to 3 days with productive cough.  Per EMS febrile, Tmax 103.  On scene, patient was hypoxic, satting around 88 to 92% on room air.  This improved with fall liters nasal cannula.  Patient also has positive vomiting, however denies chest pain or shortness of breath at the moment.  per EMS, family states this is similar to his previous presentation for sepsis or pneumonia. History limited as patient is lethargic.  of note, full run of dialysis is on Monday.  	  HIV:   HIV Test Questions:  · In accordance with [place] State law, we offer every patient who comes to our ED an HIV test. Would you like to be tested today?	Previously Negative (within the last year)    PAST MEDICAL/SURGICAL/FAMILY/SOCIAL HISTORY:   Past Medical, Past Surgical, and Family History:  PAST MEDICAL HISTORY:  ASHD (arteriosclerotic heart disease)   BPH (benign prostatic hyperplasia)   CAD (coronary artery disease)   CKD (chronic kidney disease)   Diabetes Mellitus Type II, Uncontrolled   Diabetic retinopathy   Dyslipidemia   GERD (gastroesophageal reflux disease)   Gout   Hepatitis   HTN (hypertension)   Ischemic cardiomyopathy   Myocardial infarction   Nephrolithiasis   Pulmonary hypertension   s/p Angioplasty with Stent   Vitamin D deficiency.     PAST SURGICAL HISTORY:  CHF with cardiomyopathy Insertion of Cardiomems monitor    Diabetes with retinopathy S/P PPV/PRP/GAS  OD 2/22/17 for viterous hemorrhage  H/O lithotripsy   History of eye surgery left eye  Retinal Hemorrhage   S/P coronary artery stent placement 2010 TRICIA to Diag ; 11/18/2010  LAD; 2/4/11 ATOM liberte Diag; 5/15/2017  TRICIA to 1st diag; 6/7/17 PCI with laser and high pressure balloon      REVIEW OF SYSTEMS:  CONSTITUTIONAL: No fever, weight loss, or fatigue  EYES: No eye pain, visual disturbances, or discharge  ENT:  No difficulty hearing, tinnitus, vertigo; No sinus or throat pain  NECK: No pain or stiffness  RESPIRATORY: No cough, wheezing, chills or hemoptysis; No Shortness of Breath  CARDIOVASCULAR: No chest pain, palpitations, passing out, dizziness, or leg swelling  GASTROINTESTINAL: No abdominal or epigastric pain. No nausea, vomiting, or hematemesis; No diarrhea or constipation. No melena or hematochezia.  GENITOURINARY: No dysuria, frequency, hematuria, or incontinence  NEUROLOGICAL: No headaches, memory loss, loss of strength, numbness, or tremors  SKIN: No itching, burning, rashes, or lesions   LYMPH Nodes: No enlarged glands  ENDOCRINE: No heat or cold intolerance; No hair loss  MUSCULOSKELETAL: No joint pain or swelling; No muscle, back, or extremity pain  PSYCHIATRIC: No depression, anxiety, mood swings, or difficulty sleeping  HEME/LYMPH: No easy bruising, or bleeding gums  ALLERGY AND IMMUNOLOGIC: No hives or eczema	    [ ] All others negative	  [ ] Unable to obtain    PHYSICAL EXAM:  T(C): 37.6 (11-24-23 @ 04:00), Max: 39.1 (11-23-23 @ 18:02)  HR: 108 (11-24-23 @ 04:00) (80 - 114)  BP: 140/90 (11-24-23 @ 04:00) (133/61 - 168/70)  RR: 18 (11-24-23 @ 04:00) (18 - 21)  SpO2: 98% (11-24-23 @ 04:00) (96% - 98%)  Wt(kg): --  I&O's Summary    23 Nov 2023 07:01  -  24 Nov 2023 07:00  --------------------------------------------------------  IN: 0 mL / OUT: 200 mL / NET: -200 mL        Appearance: Normal	  HEENT:   Normal oral mucosa, PERRL, EOMI	  Lymphatic: No lymphadenopathy  Cardiovascular: Normal S1 S2, No JVD, No murmurs, No edema  Respiratory: Lungs clear to auscultation	  Psychiatry: A & O x 3, Mood & affect appropriate  Gastrointestinal:  Soft, Non-tender, + BS	  Skin: No rashes, No ecchymoses, No cyanosis	  Neurologic: Non-focal  Extremities: Normal range of motion, No clubbing, cyanosis or edema  Vascular: Peripheral pulses palpable 2+ bilaterally    MEDICATIONS  (STANDING):  albuterol    90 MICROgram(s) HFA Inhaler 2 Puff(s) Inhalation every 8 hours  albuterol/ipratropium for Nebulization 3 milliLiter(s) Nebulizer every 6 hours  allopurinol 100 milliGRAM(s) Oral daily  aspirin  chewable 81 milliGRAM(s) Oral daily  atorvastatin 80 milliGRAM(s) Oral at bedtime  benzonatate 100 milliGRAM(s) Oral three times a day  carvedilol 12.5 milliGRAM(s) Oral every 12 hours  cefTRIAXone   IVPB 1000 milliGRAM(s) IV Intermittent every 24 hours  dextrose 5%. 1000 milliLiter(s) (50 mL/Hr) IV Continuous <Continuous>  dextrose 5%. 1000 milliLiter(s) (100 mL/Hr) IV Continuous <Continuous>  dextrose 50% Injectable 25 Gram(s) IV Push once  dextrose 50% Injectable 12.5 Gram(s) IV Push once  dextrose 50% Injectable 25 Gram(s) IV Push once  epoetin wayne (EPOGEN) Injectable 4000 Unit(s) IV Push once  gabapentin 100 milliGRAM(s) Oral two times a day  glucagon  Injectable 1 milliGRAM(s) IntraMuscular once  guaiFENesin  milliGRAM(s) Oral every 12 hours  heparin   Injectable 5000 Unit(s) SubCutaneous every 12 hours  influenza   Vaccine 0.5 milliLiter(s) IntraMuscular once  insulin glargine Injectable (LANTUS) 15 Unit(s) SubCutaneous at bedtime  insulin lispro (ADMELOG) corrective regimen sliding scale   SubCutaneous three times a day before meals  insulin lispro Injectable (ADMELOG) 7 Unit(s) SubCutaneous three times a day before meals  senna 2 Tablet(s) Oral at bedtime  sertraline 25 milliGRAM(s) Oral daily  sevelamer carbonate 800 milliGRAM(s) Oral three times a day  valsartan 40 milliGRAM(s) Oral daily      TELEMETRY: 	    ECG:  	  RADIOLOGY:  OTHER: 	  	  LABS:	 	    CARDIAC MARKERS:                                10.7   9.22  )-----------( 174      ( 23 Nov 2023 18:24 )             31.9     11-23    135  |  97  |  67<H>  ----------------------------<  342<H>  4.5   |  21<L>  |  7.94<H>    Ca    7.9<L>      23 Nov 2023 19:48  Phos  5.3     11-23  Mg     2.4     11-23    TPro  6.6  /  Alb  3.4  /  TBili  0.3  /  DBili  x   /  AST  17  /  ALT  13  /  AlkPhos  87  11-23    proBNP:   Lipid Profile:   HgA1c:   TSH:   PT/INR - ( 23 Nov 2023 18:24 )   PT: 11.2 sec;   INR: 1.02 ratio         PTT - ( 23 Nov 2023 18:24 )  PTT:33.6 sec      Assessment and plan  ---------------------------      	         Date of Service: 11-24-23 @ 10:44           CARDIOLOGY    H & P  NOTE   ________________________________________________    CHIEF COMPLAINT:Patient is a 63y old  Male who presents with a chief complaint of SOB (24 Nov 2023 09:11)  62yo M, hx of ESRD on HD MWF, CAD s/p stents, CHF, DM, Brought in by EMS for shortness of breath.  Patient reported not feeling well for the past 2 to 3 days with productive cough.  Per EMS febrile, Tmax 103.  On scene, patient was hypoxic, satting around 88 to 92% on room air.  This improved with fall liters nasal cannula.  Patient also has positive vomiting, however denies chest pain or shortness of breath at the moment.  per EMS, family states this is similar to his previous presentation for sepsis or pneumonia. History limited as patient is lethargic.  of note, full run of dialysis is on Monday.  	  HIV:   HIV Test Questions:  · In accordance with [place] law, we offer every patient who comes to our ED an HIV test. Would you like to be tested today?	Previously Negative (within the last year)    PAST MEDICAL/SURGICAL/FAMILY/SOCIAL HISTORY:   Past Medical, Past Surgical, and Family History:  PAST MEDICAL HISTORY:  ASHD (arteriosclerotic heart disease)   BPH (benign prostatic hyperplasia)   CAD (coronary artery disease)   CKD (chronic kidney disease)   Diabetes Mellitus Type II, Uncontrolled   Diabetic retinopathy   Dyslipidemia   GERD (gastroesophageal reflux disease)   Gout   Hepatitis   HTN (hypertension)   Ischemic cardiomyopathy   Myocardial infarction   Nephrolithiasis   Pulmonary hypertension   s/p Angioplasty with Stent   Vitamin D deficiency.     PAST SURGICAL HISTORY:  CHF with cardiomyopathy Insertion of Cardiomems monitor    Diabetes with retinopathy S/P PPV/PRP/GAS  OD 2/22/17 for viterous hemorrhage  H/O lithotripsy   History of eye surgery left eye  Retinal Hemorrhage   S/P coronary artery stent placement 2010 TRICIA to Diag ; 11/18/2010  LAD; 2/4/11 ATOM liberte Diag; 5/15/2017  TRICIA to 1st diag; 6/7/17 PCI with laser and high pressure balloon      REVIEW OF SYSTEMS:  CONSTITUTIONAL: No fever, weight loss, or fatigue  EYES: No eye pain, visual disturbances, or discharge  ENT:  No difficulty hearing, tinnitus, vertigo; No sinus or throat pain  NECK: No pain or stiffness  RESPIRATORY: No cough, wheezing, chills or hemoptysis; No Shortness of Breath  CARDIOVASCULAR: No chest pain, palpitations, passing out, dizziness, or leg swelling  GASTROINTESTINAL: No abdominal or epigastric pain. No nausea, vomiting, or hematemesis; No diarrhea or constipation. No melena or hematochezia.  GENITOURINARY: No dysuria, frequency, hematuria, or incontinence  NEUROLOGICAL: No headaches, memory loss, loss of strength, numbness, or tremors  SKIN: No itching, burning, rashes, or lesions   LYMPH Nodes: No enlarged glands  ENDOCRINE: No heat or cold intolerance; No hair loss  MUSCULOSKELETAL: No joint pain or swelling; No muscle, back, or extremity pain  PSYCHIATRIC: No depression, anxiety, mood swings, or difficulty sleeping  HEME/LYMPH: No easy bruising, or bleeding gums  ALLERGY AND IMMUNOLOGIC: No hives or eczema	    [ x] All others negative	  [ ] Unable to obtain    PHYSICAL EXAM:  T(C): 37.6 (11-24-23 @ 04:00), Max: 39.1 (11-23-23 @ 18:02)  HR: 108 (11-24-23 @ 04:00) (80 - 114)  BP: 140/90 (11-24-23 @ 04:00) (133/61 - 168/70)  RR: 18 (11-24-23 @ 04:00) (18 - 21)  SpO2: 98% (11-24-23 @ 04:00) (96% - 98%)  Wt(kg): --  I&O's Summary    23 Nov 2023 07:01  -  24 Nov 2023 07:00  --------------------------------------------------------  IN: 0 mL / OUT: 200 mL / NET: -200 mL        Appearance: Normal	  HEENT:   Normal oral mucosa, PERRL, EOMI	  Lymphatic: No lymphadenopathy  Cardiovascular: Normal S1 S2, No JVD, + murmurs, No edema  Respiratory: rhonchi  Psychiatry: A & O x 3, Mood & affect appropriate  Gastrointestinal:  Soft, Non-tender, + BS	  Skin: No rashes, No ecchymoses, No cyanosis	  Neurologic: can not asses  Extremities: Normal range of motion, No clubbing, cyanosis or edema  Vascular: Peripheral pulses palpable 2+ bilaterally    MEDICATIONS  (STANDING):  albuterol    90 MICROgram(s) HFA Inhaler 2 Puff(s) Inhalation every 8 hours  albuterol/ipratropium for Nebulization 3 milliLiter(s) Nebulizer every 6 hours  allopurinol 100 milliGRAM(s) Oral daily  aspirin  chewable 81 milliGRAM(s) Oral daily  atorvastatin 80 milliGRAM(s) Oral at bedtime  benzonatate 100 milliGRAM(s) Oral three times a day  carvedilol 12.5 milliGRAM(s) Oral every 12 hours  cefTRIAXone   IVPB 1000 milliGRAM(s) IV Intermittent every 24 hours  dextrose 5%. 1000 milliLiter(s) (50 mL/Hr) IV Continuous <Continuous>  dextrose 5%. 1000 milliLiter(s) (100 mL/Hr) IV Continuous <Continuous>  dextrose 50% Injectable 25 Gram(s) IV Push once  dextrose 50% Injectable 12.5 Gram(s) IV Push once  dextrose 50% Injectable 25 Gram(s) IV Push once  epoetin wayne (EPOGEN) Injectable 4000 Unit(s) IV Push once  gabapentin 100 milliGRAM(s) Oral two times a day  glucagon  Injectable 1 milliGRAM(s) IntraMuscular once  guaiFENesin  milliGRAM(s) Oral every 12 hours  heparin   Injectable 5000 Unit(s) SubCutaneous every 12 hours  influenza   Vaccine 0.5 milliLiter(s) IntraMuscular once  insulin glargine Injectable (LANTUS) 15 Unit(s) SubCutaneous at bedtime  insulin lispro (ADMELOG) corrective regimen sliding scale   SubCutaneous three times a day before meals  insulin lispro Injectable (ADMELOG) 7 Unit(s) SubCutaneous three times a day before meals  senna 2 Tablet(s) Oral at bedtime  sertraline 25 milliGRAM(s) Oral daily  sevelamer carbonate 800 milliGRAM(s) Oral three times a day  valsartan 40 milliGRAM(s) Oral daily      TELEMETRY: 	    ECG:  	  RADIOLOGY:  OTHER: 	  	  LABS:	 	    CARDIAC MARKERS:                            10.7   9.22  )-----------( 174      ( 23 Nov 2023 18:24 )             31.9     11-23    135  |  97  |  67<H>  ----------------------------<  342<H>  4.5   |  21<L>  |  7.94<H>    Ca    7.9<L>      23 Nov 2023 19:48  Phos  5.3     11-23  Mg     2.4     11-23    TPro  6.6  /  Alb  3.4  /  TBili  0.3  /  DBili  x   /  AST  17  /  ALT  13  /  AlkPhos  87  11-23    proBNP:   Lipid Profile:   HgA1c:   TSH:   PT/INR - ( 23 Nov 2023 18:24 )   PT: 11.2 sec;   INR: 1.02 ratio         PTT - ( 23 Nov 2023 18:24 )  PTT:33.6 sec      < from: CT Chest No Cont (11.24.23 @ 08:53) >  Bibasilar pleural effusion and peribronchovascular foci of groundglass   opacity and centrilobular nodules involving all 5 lobes which are more   consolidative in the right middle lobe. Clinical findings favors   endobronchial spread of infection over aspiration pneumonitis

## 2023-11-24 NOTE — CONSULT NOTE ADULT - ASSESSMENT
63-year-old male with a past medical history significant for ESRD on HD, CAD status post PCI, heart failure, diabetes who was admitted to the hospital due to shortness of breath.    #Fever, shortness of breath, cough  #RSV infection  #ESRD on HD  Patient feeling better since admission  No evidence for consolidation on CXR  Continus to require high flow oxygen otherwise afebrile, no leukocytosis    Recommendations  Would continue supportive measures for RSV infection, URI  Would hold off ribavirin given clinical improvement  Less likely to be harboring a bacterial superimposed pneumonia therefore would discontinue ceftriaxone  Follow fever curve and WBC count    Con Tavarez MD  Division of Infectious Diseases   63-year-old male with a past medical history significant for ESRD on HD, CAD status post PCI, heart failure, diabetes who was admitted to the hospital due to shortness of breath.    #Fever, shortness of breath, cough  #RSV infection  #ESRD on HD  Patient feeling better since admission  No evidence for consolidation on CXR  Continus to require high flow oxygen otherwise afebrile, no leukocytosis    Recommendations  Would continue supportive measures for RSV infection, URI  Would hold off ribavirin given clinical improvement  Less likely to be harboring a bacterial superimposed pneumonia therefore would discontinue ceftriaxone  Obtain procalcitonin   Follow fever curve and WBC count    ID to sign off. Please contact as issues arise.    Con Tavarez MD  Division of Infectious Diseases

## 2023-11-24 NOTE — CONSULT NOTE ADULT - SUBJECTIVE AND OBJECTIVE BOX
62yo M,        hx of ESRD on HD MWF,   CAD s/p stents, CHF, DM,       admitted with  shortness of breath.   pt  was  not feeling well for the past 2 to 3 days with  cough.  Per EMS febrile, Tmax 103.     pt   was hypoxic, s sat  was  88,  improved  on   nasal cannula.       denies cp/sob now        Date of service: 23 @ 10:36    REVIEW OF SYSTEMS:  CONSTITUTIONAL: + fever,  no  weight loss  ENT:  No  tinnitus,   no   vertigo  NECK: No pain or stiffness  RESPIRATORY: No cough, wheezing, chills or hemoptysis;   +Shortness of Breath  CARDIOVASCULAR: No chest pain, palpitations, dizziness  GASTROINTESTINAL: No abdominal or epigastric pain. No nausea, vomiting, or hematemesis; No diarrhea  No melena or hematochezia.  GENITOURINARY: No dysuria, frequency, hematuria, or incontinence  NEUROLOGICAL: No headaches  SKIN: No itching,  no   rash  LYMPH Nodes: No enlarged glands  ENDOCRINE: No heat or cold intolerance  MUSCULOSKELETAL: No joint pain or swelling  PSYCHIATRIC: No depression, anxiety  HEME/LYMPH: No easy bruising, or bleeding gums  ALLERGY AND IMMUNOLOGIC: No hives or eczema	    Allergies    shellfish (Unknown)  No Known Drug Allergies    Intolerances        CAPILLARY BLOOD GLUCOSE      POCT Blood Glucose.: 355 mg/dL (2023 08:28)  POCT Blood Glucose.: 352 mg/dL (2023 08:18)    I&O's Summary    2023 07:01  -  2023 07:00  --------------------------------------------------------  IN: 0 mL / OUT: 200 mL / NET: -200 mL        Vital Signs Last 24 Hrs  T(C): 37.6 (2023 04:00), Max: 39.1 (2023 18:02)  T(F): 99.6 (2023 04:00), Max: 102.3 (2023 18:02)  HR: 108 (2023 04:00) (80 - 114)  BP: 140/90 (2023 04:00) (133/61 - 168/70)  BP(mean): 96 (2023 19:10) (96 - 96)  RR: 18 (2023 04:00) (18 - 21)  SpO2: 98% (2023 04:00) (96% - 98%)    Parameters below as of 2023 04:00  Patient On (Oxygen Delivery Method): nasal cannula  O2 Flow (L/min): 2      PHYSICAL EXAM:  Appearance: Normal	  HEENT:   Normal oral mucosa, PERRL, EOMI	  Lymphatic: No lymphadenopathy  Cardiovascular: Normal S1 S2, No JVD  Respiratory: Lungs clear to auscultation	  Psychiatry:    Mood & affect appropriate  Gastrointestinal:  Soft, Non-tender, + BS	  Skin: No rash, No ecchymoses	  Extremities: Normal range of motion  Vascular: Peripheral pulses palpable bilaterally    MEDICATIONS  (STANDING):  albuterol    90 MICROgram(s) HFA Inhaler 2 Puff(s) Inhalation every 8 hours  albuterol/ipratropium for Nebulization 3 milliLiter(s) Nebulizer every 6 hours  allopurinol 100 milliGRAM(s) Oral daily  aspirin  chewable 81 milliGRAM(s) Oral daily  atorvastatin 80 milliGRAM(s) Oral at bedtime  benzonatate 100 milliGRAM(s) Oral three times a day  carvedilol 12.5 milliGRAM(s) Oral every 12 hours  cefTRIAXone   IVPB 1000 milliGRAM(s) IV Intermittent every 24 hours  dextrose 5%. 1000 milliLiter(s) (100 mL/Hr) IV Continuous <Continuous>  dextrose 5%. 1000 milliLiter(s) (50 mL/Hr) IV Continuous <Continuous>  dextrose 50% Injectable 25 Gram(s) IV Push once  dextrose 50% Injectable 12.5 Gram(s) IV Push once  dextrose 50% Injectable 25 Gram(s) IV Push once  epoetin wayne (EPOGEN) Injectable 4000 Unit(s) IV Push once  gabapentin 100 milliGRAM(s) Oral two times a day  glucagon  Injectable 1 milliGRAM(s) IntraMuscular once  guaiFENesin  milliGRAM(s) Oral every 12 hours  heparin   Injectable 5000 Unit(s) SubCutaneous every 12 hours  influenza   Vaccine 0.5 milliLiter(s) IntraMuscular once  insulin glargine Injectable (LANTUS) 15 Unit(s) SubCutaneous at bedtime  insulin lispro (ADMELOG) corrective regimen sliding scale   SubCutaneous three times a day before meals  insulin lispro Injectable (ADMELOG) 7 Unit(s) SubCutaneous three times a day before meals  senna 2 Tablet(s) Oral at bedtime  sertraline 25 milliGRAM(s) Oral daily  sevelamer carbonate 800 milliGRAM(s) Oral three times a day  valsartan 40 milliGRAM(s) Oral daily    MEDICATIONS  (PRN):  dextrose Oral Gel 15 Gram(s) Oral once PRN Blood Glucose LESS THAN 70 milliGRAM(s)/deciliter    LABS:                        10.7   9.22  )-----------( 174      ( 2023 18:24 )             31.9         135  |  97  |  67<H>  ----------------------------<  342<H>  4.5   |  21<L>  |  7.94<H>    Ca    7.9<L>      2023 19:48  Phos  5.3       Mg     2.4         TPro  6.6  /  Alb  3.4  /  TBili  0.3  /  DBili  x   /  AST  17  /  ALT  13  /  AlkPhos  87  11    PT/INR - ( 2023 18:24 )   PT: 11.2 sec;   INR: 1.02 ratio         PTT - ( 2023 18:24 )  PTT:33.6 sec      Urinalysis Basic - ( 2023 19:56 )    Color: Yellow / Appearance: Clear / S.023 / pH: x  Gluc: x / Ketone: Trace mg/dL  / Bili: Negative / Urobili: 0.2 mg/dL   Blood: x / Protein: >=1000 mg/dL / Nitrite: Negative   Leuk Esterase: Negative / RBC: 15 /HPF / WBC 0 /HPF   Sq Epi: x / Non Sq Epi: 2 /HPF / Bacteria: Negative /HPF           @ 18:23  6.5  63              Consultant(s) Notes Reviewed:      Care Discussed with Consultants/Other Providers:  Plan:

## 2023-11-24 NOTE — CONSULT NOTE ADULT - SUBJECTIVE AND OBJECTIVE BOX
Patient is a 63y old  Male who presents with a chief complaint of sob (2023 10:43)    HPI:  63-year-old male with a past medical history significant for ESRD on HD, CAD status post PCI, heart failure, diabetes who was admitted to the hospital due to shortness of breath.    Patient reports 2 to 3 days of symptoms prior to admission including shortness of breath, cough with sputum production.  Patient also reports fevers with a Tmax of 103 Fahrenheit.  Patient also noted to be hypoxic during EMS assessment requiring supplemental oxygen.    Upon admission, patient is afebrile.  Otherwise hemodynamically stable.  Requiring 2 L nasal cannula.  Latest labs show leukopenia of 3.2, anemia 10.7/31.9, BMP with elevated creatinine in setting of ESRD, LFTs within normal limits.  Urinalysis without evidence for infection.  RSV viral panel positive on admission.  MRSA PCR nares negative.  Chest x-ray showed patchy hazy opacities.  CT chest shows bilateral pleural effusions with peribronchovascular foci of groundglass opacities and centrilobular nodules.  Patient started on ceftriaxone.    #Fever, shortness of breath, cough  #RSV infection  #ESRD on HD    Recommendations          prior hospital charts reviewed [  ]  primary team notes reviewed [  ]  other consultant notes reviewed [  ]    PAST MEDICAL & SURGICAL HISTORY:  Diabetes Mellitus Type II, Uncontrolled      Dyslipidemia      s/p Angioplasty with Stent      HTN (hypertension)      Gout      Diabetic retinopathy      GERD (gastroesophageal reflux disease)      Nephrolithiasis      Vitamin D deficiency      Hepatitis      CKD (chronic kidney disease)      Ischemic cardiomyopathy      ASHD (arteriosclerotic heart disease)      Pulmonary hypertension      Myocardial infarction      CAD (coronary artery disease)      BPH (benign prostatic hyperplasia)      Retinal Hemorrhage      S/P coronary artery stent placement   TRICIA to Diag ; 2010  LAD; 11 [facility] liberte Diag; 5/15/2017  TRICIA to  diag; 17 PCI with laser and high pressure balloon      History of eye surgery  left eye      H/O lithotripsy      Diabetes with retinopathy  S/P PPV/PRP/GAS  OD 17 for viterous hemorrhage      CHF with cardiomyopathy  Insertion of Cardiomems monitor      Stented coronary artery          Allergies  shellfish (Unknown)  No Known Drug Allergies    ANTIMICROBIALS (past 90 days)  MEDICATIONS  (STANDING):  cefTRIAXone   IVPB   100 mL/Hr IV Intermittent (23 @ 09:28)    piperacillin/tazobactam IVPB...   200 mL/Hr IV Intermittent (23 @ 19:46)        cefTRIAXone   IVPB 1000 every 24 hours    MEDICATIONS  (STANDING):  albuterol    90 MICROgram(s) HFA Inhaler 2 every 8 hours  albuterol/ipratropium for Nebulization 3 every 6 hours  allopurinol 100 daily  aspirin  chewable 81 daily  atorvastatin 80 at bedtime  benzonatate 100 three times a day  carvedilol 12.5 every 12 hours  clopidogrel Tablet 75 daily  dextrose 50% Injectable 25 once  dextrose 50% Injectable 25 once  dextrose 50% Injectable 12.5 once  dextrose Oral Gel 15 once PRN  epoetin wayne (EPOGEN) Injectable 4000 once  gabapentin 100 two times a day  glucagon  Injectable 1 once  guaiFENesin  every 12 hours  heparin   Injectable 5000 every 12 hours  influenza   Vaccine 0.5 once  insulin glargine Injectable (LANTUS) 15 at bedtime  insulin lispro (ADMELOG) corrective regimen sliding scale  three times a day before meals  insulin lispro Injectable (ADMELOG) 7 three times a day before meals  senna 2 at bedtime  sertraline 25 daily  valsartan 40 daily    SOCIAL HISTORY:       FAMILY HISTORY:  Family history of cardiac disorder in father (Father)      REVIEW OF SYSTEMS  [  ] ROS unobtainable because:    [  ] All other systems negative except as noted below:	    Constitutional:  [ ] fever [ ] chills  [ ] weight loss  [ ] weakness  Skin:  [ ] rash [ ] phlebitis	  Eyes: [ ] icterus [ ] pain  [ ] discharge	  ENMT: [ ] sore throat  [ ] thrush [ ] ulcers [ ] exudates  Respiratory: [ ] dyspnea [ ] hemoptysis [ ] cough [ ] sputum	  Cardiovascular:  [ ] chest pain [ ] palpitations [ ] edema	  Gastrointestinal:  [ ] nausea [ ] vomiting [ ] diarrhea [ ] constipation [ ] pain	  Genitourinary:  [ ] dysuria [ ] frequency [ ] hematuria [ ] discharge [ ] flank pain  [ ] incontinence  Musculoskeletal:  [ ] myalgias [ ] arthralgias [ ] arthritis  [ ] back pain  Neurological:  [ ] headache [ ] seizures  [ ] confusion/altered mental status  Psychiatric:  [ ] anxiety [ ] depression	  Hematology/Lymphatics:  [ ] lymphadenopathy  Endocrine:  [ ] adrenal [ ] thyroid  Allergic/Immunologic:	 [ ] transplant [ ] seasonal    Vital Signs Last 24 Hrs  T(F): 99.6 (23 @ 04:00), Max: 102.3 (23 @ 18:02)  Vital Signs Last 24 Hrs  HR: 108 (23 @ 04:00) (80 - 114)  BP: 140/90 (23 @ 04:00) (133/61 - 168/70)  RR: 18 (23 @ 04:00)  SpO2: 98% (23 @ 04:00) (96% - 98%)  Wt(kg): --    PHYSICAL EXAM:  Constitutional: non-toxic, no distress  HEAD/EYES: anicteric, no conjunctival injection  ENT:  supple, no thrush  Cardiovascular:   normal S1, S2, no murmur, no edema  Respiratory:  clear BS bilaterally, no wheezes, no rales  GI:  soft, non-tender, normal bowel sounds  :  no delong, no CVA tenderness  Musculoskeletal:  no synovitis, normal ROM  Neurologic: awake and alert, normal strength, no focal findings  Skin:  no rash, no erythema, no phlebitis  Heme/Onc: no lymphadenopathy   Psychiatric:  awake, alert, appropriate mood                            10.7   9.22  )-----------( 174      ( 2023 18:24 )             31.9       135  |  97  |  67<H>  ----------------------------<  342<H>  4.5   |  21<L>  |  7.94<H>    Ca    7.9<L>      2023 19:48  Phos  5.3       Mg     2.4         TPro  6.6  /  Alb  3.4  /  TBili  0.3  /  DBili  x   /  AST  17  /  ALT  13  /  AlkPhos  87      Urinalysis Basic - ( 2023 19:56 )    Color: Yellow / Appearance: Clear / S.023 / pH: x  Gluc: x / Ketone: Trace mg/dL  / Bili: Negative / Urobili: 0.2 mg/dL   Blood: x / Protein: >=1000 mg/dL / Nitrite: Negative   Leuk Esterase: Negative / RBC: 15 /HPF / WBC 0 /HPF   Sq Epi: x / Non Sq Epi: 2 /HPF / Bacteria: Negative /HPF    MICROBIOLOGY:              RADIOLOGY:  imaging below personally reviewed and agree with findings Patient is a 63y old  Male who presents with a chief complaint of sob (2023 10:43)    HPI:  63-year-old male with a past medical history significant for ESRD on HD, CAD status post PCI, heart failure, diabetes who was admitted to the hospital due to shortness of breath.    Patient reports 2 to 3 days of symptoms prior to admission including shortness of breath, cough with sputum production.  Patient also reports fevers with a Tmax of 103 Fahrenheit.  Patient also noted to be hypoxic during EMS assessment requiring supplemental oxygen.    Upon admission, patient is afebrile.  Otherwise hemodynamically stable.  Requiring 2 L nasal cannula.  Latest labs show leukopenia of 3.2, anemia 10.7/31.9, BMP with elevated creatinine in setting of ESRD, LFTs within normal limits.  Urinalysis without evidence for infection.  RSV viral panel positive on admission.  MRSA PCR nares negative.  Chest x-ray showed patchy hazy opacities.  CT chest shows bilateral pleural effusions with peribronchovascular foci of groundglass opacities and centrilobular nodules.  Patient started on ceftriaxone.    prior hospital charts reviewed [  x]  primary team notes reviewed [ x ]  other consultant notes reviewed [ x ]    PAST MEDICAL & SURGICAL HISTORY:  Diabetes Mellitus Type II, Uncontrolled      Dyslipidemia      s/p Angioplasty with Stent      HTN (hypertension)      Gout      Diabetic retinopathy      GERD (gastroesophageal reflux disease)      Nephrolithiasis      Vitamin D deficiency      Hepatitis      CKD (chronic kidney disease)      Ischemic cardiomyopathy      ASHD (arteriosclerotic heart disease)      Pulmonary hypertension      Myocardial infarction      CAD (coronary artery disease)      BPH (benign prostatic hyperplasia)      Retinal Hemorrhage      S/P coronary artery stent placement   TRICIA to Diag ; 2010  LAD; 11 [facility] liberte Diag; 5/15/2017  TRICIA to  diag; 17 PCI with laser and high pressure balloon      History of eye surgery  left eye      H/O lithotripsy      Diabetes with retinopathy  S/P PPV/PRP/GAS  OD 17 for viterous hemorrhage      CHF with cardiomyopathy  Insertion of Cardiomems monitor      Stented coronary artery          Allergies  shellfish (Unknown)  No Known Drug Allergies    ANTIMICROBIALS (past 90 days)  MEDICATIONS  (STANDING):  cefTRIAXone   IVPB   100 mL/Hr IV Intermittent (23 @ 09:28)    piperacillin/tazobactam IVPB...   200 mL/Hr IV Intermittent (23 @ 19:46)        cefTRIAXone   IVPB 1000 every 24 hours    MEDICATIONS  (STANDING):  albuterol    90 MICROgram(s) HFA Inhaler 2 every 8 hours  albuterol/ipratropium for Nebulization 3 every 6 hours  allopurinol 100 daily  aspirin  chewable 81 daily  atorvastatin 80 at bedtime  benzonatate 100 three times a day  carvedilol 12.5 every 12 hours  clopidogrel Tablet 75 daily  dextrose 50% Injectable 25 once  dextrose 50% Injectable 25 once  dextrose 50% Injectable 12.5 once  dextrose Oral Gel 15 once PRN  epoetin wayne (EPOGEN) Injectable 4000 once  gabapentin 100 two times a day  glucagon  Injectable 1 once  guaiFENesin  every 12 hours  heparin   Injectable 5000 every 12 hours  influenza   Vaccine 0.5 once  insulin glargine Injectable (LANTUS) 15 at bedtime  insulin lispro (ADMELOG) corrective regimen sliding scale  three times a day before meals  insulin lispro Injectable (ADMELOG) 7 three times a day before meals  senna 2 at bedtime  sertraline 25 daily  valsartan 40 daily    SOCIAL HISTORY:     Denies alchol/tobacco/illicit drug use  FAMILY HISTORY:  Family history of cardiac disorder in father (Father)      REVIEW OF SYSTEMS  [  ] ROS unobtainable because:    [  x] All other systems negative except as noted below:	    Constitutional:  [ ] fever [ ] chills  [ ] weight loss  [ ] weakness  Skin:  [ ] rash [ ] phlebitis	  Eyes: [ ] icterus [ ] pain  [ ] discharge	  ENMT: [ ] sore throat  [ ] thrush [ ] ulcers [ ] exudates  Respiratory: [x ] dyspnea [ ] hemoptysis [xx ] cough [ ] sputum	  Cardiovascular:  [ ] chest pain [ ] palpitations [ ] edema	  Gastrointestinal:  [ ] nausea [ ] vomiting [ ] diarrhea [ ] constipation [ ] pain	  Genitourinary:  [ ] dysuria [ ] frequency [ ] hematuria [ ] discharge [ ] flank pain  [ ] incontinence  Musculoskeletal:  [ ] myalgias [ ] arthralgias [ ] arthritis  [ ] back pain  Neurological:  [ ] headache [ ] seizures  [ ] confusion/altered mental status  Psychiatric:  [ ] anxiety [ ] depression	  Hematology/Lymphatics:  [ ] lymphadenopathy  Endocrine:  [ ] adrenal [ ] thyroid  Allergic/Immunologic:	 [ ] transplant [ ] seasonal    Vital Signs Last 24 Hrs  T(F): 99.6 (23 @ 04:00), Max: 102.3 (11-23-23 @ 18:02)  Vital Signs Last 24 Hrs  HR: 108 (23 @ 04:00) (80 - 114)  BP: 140/90 (23 @ 04:00) (133/61 - 168/70)  RR: 18 (23 @ 04:00)  SpO2: 98% (23 @ 04:00) (96% - 98%)  Wt(kg): --    PHYSICAL EXAM:  Constitutional: non-toxic, no distress, HFNC in place  HEAD/EYES: anicteric, no conjunctival injection  ENT:  supple, no thrush  Cardiovascular:   normal S1, S2, no murmur, no edema  Respiratory:  clear BS bilaterally, no wheezes, no rales  GI:  soft, non-tender, normal bowel sounds  :  no delong, no CVA tenderness  Musculoskeletal:  no synovitis, normal ROM  Neurologic: awake and alert, normal strength, no focal findings  Skin:  no rash, no erythema, no phlebitis  Heme/Onc: no lymphadenopathy   Psychiatric:  awake, alert, appropriate mood                            10.7   9.22  )-----------( 174      ( 2023 18:24 )             31.9       135  |  97  |  67<H>  ----------------------------<  342<H>  4.5   |  21<L>  |  7.94<H>    Ca    7.9<L>      2023 19:48  Phos  5.3       Mg     2.4         TPro  6.6  /  Alb  3.4  /  TBili  0.3  /  DBili  x   /  AST  17  /  ALT  13  /  AlkPhos  87      Urinalysis Basic - ( 2023 19:56 )    Color: Yellow / Appearance: Clear / S.023 / pH: x  Gluc: x / Ketone: Trace mg/dL  / Bili: Negative / Urobili: 0.2 mg/dL   Blood: x / Protein: >=1000 mg/dL / Nitrite: Negative   Leuk Esterase: Negative / RBC: 15 /HPF / WBC 0 /HPF   Sq Epi: x / Non Sq Epi: 2 /HPF / Bacteria: Negative /HPF    MICROBIOLOGY:              RADIOLOGY:  imaging below personally reviewed and agree with findings

## 2023-11-24 NOTE — CONSULT NOTE ADULT - SUBJECTIVE AND OBJECTIVE BOX
HPI: Patient is a 63 M with history of ESRD on HD, CAD s/p stent, CHF, DM here for SOB, found to have RSV infection. Endocrinology consulted for diabetes management.     Diabetes history:  Regarding type 2 diabetes, diagnosed many years ago.  Complicated by diabetic retinopathy, diabetic neuropathy and diabetic nephropathy.  Currently on hemodialysis.  Also complicated by history of CAD and CHF. Follows with Dr. Hernandez for diabetes management.     Takes Toujeo and Humalog at home. Glucose at home usually in the 200s. denies hypoglycemia. A1C noted to be 9.5,     A1C 9.5 but may be inaccurate secondary to history of ESRD     Home DM medications:  - Toujeo 20 u nits at bedtime   - Humalog  10 units 3 times daily with meals       Current inpatient DM Meds:   - LDSS     FH:  DM: denies     SH:  Smoking: denies  Etoh: denies  Recreational Drugs: denies       PAST MEDICAL & SURGICAL HISTORY:  Diabetes Mellitus Type II, Uncontrolled      Dyslipidemia      s/p Angioplasty with Stent      HTN (hypertension)      Gout      Diabetic retinopathy      GERD (gastroesophageal reflux disease)      Nephrolithiasis      Vitamin D deficiency      Hepatitis      CKD (chronic kidney disease)      Ischemic cardiomyopathy      ASHD (arteriosclerotic heart disease)      Pulmonary hypertension      Myocardial infarction      CAD (coronary artery disease)      BPH (benign prostatic hyperplasia)      Retinal Hemorrhage      S/P coronary artery stent placement   TRICIA to Diag ; 2010  LAD; 11 [facility] liberte Diag; 5/15/2017  TRICIA to  diag; 17 PCI with laser and high pressure balloon      History of eye surgery  left eye      H/O lithotripsy      Diabetes with retinopathy  S/P PPV/PRP/GAS  OD 17 for viterous hemorrhage      CHF with cardiomyopathy  Insertion of Cardiomems monitor      Stented coronary artery              Current Meds:  albuterol    90 MICROgram(s) HFA Inhaler 2 Puff(s) Inhalation every 8 hours  albuterol/ipratropium for Nebulization 3 milliLiter(s) Nebulizer every 6 hours  allopurinol 100 milliGRAM(s) Oral daily  aspirin  chewable 81 milliGRAM(s) Oral daily  atorvastatin 80 milliGRAM(s) Oral at bedtime  benzonatate 100 milliGRAM(s) Oral three times a day  carvedilol 12.5 milliGRAM(s) Oral every 12 hours  cefTRIAXone   IVPB 1000 milliGRAM(s) IV Intermittent every 24 hours  chlorhexidine 4% Liquid 1 Application(s) Topical <User Schedule>  clopidogrel Tablet 75 milliGRAM(s) Oral daily  dextrose 5%. 1000 milliLiter(s) IV Continuous <Continuous>  dextrose 5%. 1000 milliLiter(s) IV Continuous <Continuous>  dextrose 50% Injectable 25 Gram(s) IV Push once  dextrose 50% Injectable 12.5 Gram(s) IV Push once  dextrose 50% Injectable 25 Gram(s) IV Push once  dextrose Oral Gel 15 Gram(s) Oral once PRN  epoetin wayne (EPOGEN) Injectable 4000 Unit(s) IV Push once  gabapentin 100 milliGRAM(s) Oral two times a day  glucagon  Injectable 1 milliGRAM(s) IntraMuscular once  guaiFENesin  milliGRAM(s) Oral every 12 hours  heparin   Injectable 5000 Unit(s) SubCutaneous every 12 hours  influenza   Vaccine 0.5 milliLiter(s) IntraMuscular once  insulin glargine Injectable (LANTUS) 15 Unit(s) SubCutaneous at bedtime  insulin lispro (ADMELOG) corrective regimen sliding scale   SubCutaneous three times a day before meals  insulin lispro Injectable (ADMELOG) 7 Unit(s) SubCutaneous three times a day before meals  senna 2 Tablet(s) Oral at bedtime  sertraline 25 milliGRAM(s) Oral daily  sevelamer carbonate 800 milliGRAM(s) Oral three times a day  valsartan 40 milliGRAM(s) Oral daily      Allergies:  shellfish (Unknown)  No Known Drug Allergies      ROS:     Constitutional: No fever, good appetite/po intake  Eyes: No blurry vision, diplopia  Neuro: No tremors  HEENT: No pain  Cardiovascular: No chest pain, palpitations  Respiratory: No SOB, no cough  GI: No nausea, vomiting,   : No dysuria, hematuria  Skin: no rash  Psych: no depression  Endocrine: no polyuria, polydipsia  Hem/lymph: no swelling  Osteoporosis: no fractures     Vital Signs Last 24 Hrs  T(C): 37.6 (2023 04:00), Max: 39.1 (2023 18:02)  T(F): 99.6 (2023 04:00), Max: 102.3 (2023 18:02)  HR: 108 (2023 04:00) (80 - 114)  BP: 140/90 (2023 04:00) (133/61 - 168/70)  BP(mean): 96 (2023 19:10) (96 - 96)  RR: 18 (2023 04:00) (18 - 21)  SpO2: 98% (2023 04:00) (96% - 98%)    Parameters below as of 2023 04:00  Patient On (Oxygen Delivery Method): nasal cannula  O2 Flow (L/min): 2    Height (cm): 170.2 ( @ 18:02)  Weight (kg): 79.4 ( @ 18:02)  BMI (kg/m2): 27.4 ( @ 18:02)    VITALS: T(C): 37.6 (23 @ 04:00)  T(F): 99.6 (23 @ 04:00), Max: 102.3 (23 @ 18:02)  HR: 108 (23 @ 04:00) (80 - 114)  BP: 140/90 (23 @ 04:00) (133/61 - 168/70)  RR:  (18 - 21)  SpO2:  (96% - 98%)  Wt(kg): --  GENERAL: NAD, well-groomed, well-developed  EYES: No proptosis, no lid lag   HEENT:  Atraumatic, Normocephalic, moist mucous membranes  THYROID: Normal size, no palpable nodules, no thyromegaly  RESPIRATORY: non labored breathing, no accessory muscle use  CARDIOVASCULAR: non tachycardic, no peripheral edema  GI: Soft, nontender, non distended   SKIN: Dry, intact, No rashes or lesions  NEURO: sensation intact, no tremor  EXTREMITIES: no foot ulcers and bilateral distal pedal pulses intact  PSYCH: reactive affect, euthymic mood      LABS:                        10.7   9.22  )-----------( 174      ( 2023 18:24 )             31.9         135  |  97  |  67<H>  ----------------------------<  342<H>  4.5   |  21<L>  |  7.94<H>    Ca    7.9<L>      2023 19:48  Phos  5.3       Mg     2.4         TPro  6.6  /  Alb  3.4  /  TBili  0.3  /  DBili  x   /  AST  17  /  ALT  13  /  AlkPhos  87      PT/INR - ( 2023 18:24 )   PT: 11.2 sec;   INR: 1.02 ratio         PTT - ( 2023 18:24 )  PTT:33.6 sec  Urinalysis Basic - ( 2023 19:56 )    Color: Yellow / Appearance: Clear / S.023 / pH: x  Gluc: x / Ketone: Trace mg/dL  / Bili: Negative / Urobili: 0.2 mg/dL   Blood: x / Protein: >=1000 mg/dL / Nitrite: Negative   Leuk Esterase: Negative / RBC: 15 /HPF / WBC 0 /HPF   Sq Epi: x / Non Sq Epi: 2 /HPF / Bacteria: Negative /HPF        Thyroid Stimulating Hormone, Serum: 0.84 ( @ 07:28)  Thyroid Stimulating Hormone, Serum: 2.03 ( @ 06:43)      RADIOLOGY & ADDITIONAL STUDIES:  CAPILLARY BLOOD GLUCOSE      POCT Blood Glucose.: 412 mg/dL (2023 11:11)  POCT Blood Glucose.: 415 mg/dL (2023 11:10)  POCT Blood Glucose.: 355 mg/dL (2023 08:28)  POCT Blood Glucose.: 352 mg/dL (2023 08:18)

## 2023-11-24 NOTE — CONSULT NOTE ADULT - ASSESSMENT
63    yr      hx CAD   s/p  2 drug eluding stents 11/2022       CHF, DM, gout, ESRD on dialysis      c/c  weakness  of   limbs/  neuro dr mccain/ h/o   gout, right  wrist,  has  functional  quadriplegia/  CT  head, . 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      pt  with  h/o intermittent  right  arm /leg weakness  has been   c/c  for  yrs/  with  unclear  etiology / MRI  head/  c spine in 10/22,  no cord  compression       seen by neuro in  past,   per  wife,  thinks  pt  has  a psychological  component  regarding  his  weakness/  s/p Strep pneumoniae  bacteremia  and pna           now admitted  with fevers/   sob,  from viral  sx       RSV  +/  supportive care            on  iv rocephin/ CT  chest     Gout    CAD,    s/p    pci.        on  asa/ plavix/   lipitor,          ef  45. on 5/.23     CKD,  . on HD           renal  dr dickerson      c/c  anemia     DM,  follow  fs           on lantus     on  dvt ppx/ gabapentin,  asa.  lipitor,  coreg. valsartan, , lantus,  zolofr. allopurinol       rad< from: TTE W or WO Ultrasound Enhancing Agent (05.24.23 @ 12:00) >  CONCLUSIONS:     1. The left ventricular systolic function is mildly decreased with an ejection fraction visually estimated at 45 to 50 %. There are regional wall motion abnormalities.   2. Mid and apical anterior septum and apex are abnormal.   3. Normal systolic function. The tricuspid annular plane systolic excursion (TAPSE) is 1.8 cm (normal >=1.7 cm).   4. No prior echocardiogram is available for comparison.    _____________________________________________________________  < end of copied text >                                             63    yr      hx CAD   s/p  2 drug eluding stents 11/2022       CHF, DM, gout, ESRD on dialysis      c/c  weakness  of   limbs/  neuro dr mccain/ h/o   gout, right  wrist,  has  functional  quadriplegia/  CT  head, . 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      pt  with  h/o intermittent  right  arm /leg weakness  has been   c/c  for  yrs/  with  unclear  etiology / MRI  head/  c spine in 10/22,  no cord  compression       seen by neuro in  past,   per  wife,  thinks  pt  has  a psychological  component  regarding  his  weakness/  s/p Strep pneumoniae  bacteremia  and pna           now admitted  with fevers/   sob,  from viral  sx          RSV  +/  supportive care.  appears  well.  not hypoxic             on  iv rocephin/ CT  chest     Gout    CAD,    s/p    pci.        on  asa/ plavix/   lipitor,          ef  45. on 5/.23     CKD,  . on HD           renal  dr dickerson      c/c  anemia     DM,  follow  fs           on lantus     on  dvt ppx/ gabapentin,  asa.  lipitor,  coreg. valsartan, , lantus,  zolofr. allopurinol       rad< from: TTE W or WO Ultrasound Enhancing Agent (05.24.23 @ 12:00) >  CONCLUSIONS:     1. The left ventricular systolic function is mildly decreased with an ejection fraction visually estimated at 45 to 50 %. There are regional wall motion abnormalities.   2. Mid and apical anterior septum and apex are abnormal.   3. Normal systolic function. The tricuspid annular plane systolic excursion (TAPSE) is 1.8 cm (normal >=1.7 cm).   4. No prior echocardiogram is available for comparison.    _____________________________________________________________  < end of copied text >

## 2023-11-24 NOTE — H&P ADULT - ASSESSMENT
64yo M, hx of ESRD on HD MWF, CAD s/p stents, CHF, DM, Brought in by EMS for shortness of breath.  Patient reported not feeling well for the past 2 to 3 days with productive cough.  Per EMS febrile, Tmax 103.  On scene, patient was hypoxic, satting around 88 to 92% on room air.  This improved with fall liters nasal cannula.  Patient also has positive vomiting, however denies chest pain or shortness of breath at the moment.  per EMS, family states this is similar to his previous presentation for sepsis or pneumonia. History limited as patient is lethargic.  of note, full run of dialysis is on Monday. 62yo M, hx of ESRD on HD MWF, CAD s/p stents, CHF, DM, Brought in by EMS for shortness of breath.  Patient reported not feeling well for the past 2 to 3 days with productive cough.  Per EMS febrile, Tmax 103.  On scene, patient was hypoxic, satting around 88 to 92% on room air.  This improved with fall liters nasal cannula.  Patient also has positive vomiting, however denies chest pain or shortness of breath at the moment.  per EMS, family states this is similar to his previous presentation for sepsis or pneumonia. History limited as patient is lethargic of note, full run of dialysis is on Monday.  pt with hx of ASHD ESRD on HD CHF, DM with increasing sob, pt had a recent  visit in my office ,now with increasing sob.  rvp is pos RSV  ID has called  ESRD on HD  continue all cardiac meds, plavix with sig cad  dvt prophylaxis

## 2023-11-24 NOTE — H&P ADULT - NSHPLABSRESULTS_GEN_ALL_CORE
< from: CT Chest No Cont (11.24.23 @ 08:53) >    Bibasilar pleural effusion and peribronchovascular foci of ground-glass   opacity and centrilobular nodules involving all 5 lobes which are more   consolidative in the right middle lobe. Clinical findings favors   endobronchial spread of infection over aspiration pneumonitis

## 2023-11-25 LAB
ANION GAP SERPL CALC-SCNC: 23 MMOL/L — HIGH (ref 5–17)
ANION GAP SERPL CALC-SCNC: 23 MMOL/L — HIGH (ref 5–17)
BUN SERPL-MCNC: 44 MG/DL — HIGH (ref 7–23)
BUN SERPL-MCNC: 44 MG/DL — HIGH (ref 7–23)
CALCIUM SERPL-MCNC: 8.8 MG/DL — SIGNIFICANT CHANGE UP (ref 8.4–10.5)
CALCIUM SERPL-MCNC: 8.8 MG/DL — SIGNIFICANT CHANGE UP (ref 8.4–10.5)
CHLORIDE SERPL-SCNC: 92 MMOL/L — LOW (ref 96–108)
CHLORIDE SERPL-SCNC: 92 MMOL/L — LOW (ref 96–108)
CO2 SERPL-SCNC: 23 MMOL/L — SIGNIFICANT CHANGE UP (ref 22–31)
CO2 SERPL-SCNC: 23 MMOL/L — SIGNIFICANT CHANGE UP (ref 22–31)
CREAT SERPL-MCNC: 6.31 MG/DL — HIGH (ref 0.5–1.3)
CREAT SERPL-MCNC: 6.31 MG/DL — HIGH (ref 0.5–1.3)
EGFR: 9 ML/MIN/1.73M2 — LOW
EGFR: 9 ML/MIN/1.73M2 — LOW
GLUCOSE BLDC GLUCOMTR-MCNC: 158 MG/DL — HIGH (ref 70–99)
GLUCOSE BLDC GLUCOMTR-MCNC: 158 MG/DL — HIGH (ref 70–99)
GLUCOSE BLDC GLUCOMTR-MCNC: 177 MG/DL — HIGH (ref 70–99)
GLUCOSE BLDC GLUCOMTR-MCNC: 177 MG/DL — HIGH (ref 70–99)
GLUCOSE BLDC GLUCOMTR-MCNC: 206 MG/DL — HIGH (ref 70–99)
GLUCOSE BLDC GLUCOMTR-MCNC: 206 MG/DL — HIGH (ref 70–99)
GLUCOSE BLDC GLUCOMTR-MCNC: 238 MG/DL — HIGH (ref 70–99)
GLUCOSE BLDC GLUCOMTR-MCNC: 238 MG/DL — HIGH (ref 70–99)
GLUCOSE BLDC GLUCOMTR-MCNC: 241 MG/DL — HIGH (ref 70–99)
GLUCOSE BLDC GLUCOMTR-MCNC: 241 MG/DL — HIGH (ref 70–99)
GLUCOSE SERPL-MCNC: 187 MG/DL — HIGH (ref 70–99)
GLUCOSE SERPL-MCNC: 187 MG/DL — HIGH (ref 70–99)
HCT VFR BLD CALC: 34 % — LOW (ref 39–50)
HCT VFR BLD CALC: 34 % — LOW (ref 39–50)
HGB BLD-MCNC: 11.2 G/DL — LOW (ref 13–17)
HGB BLD-MCNC: 11.2 G/DL — LOW (ref 13–17)
MCHC RBC-ENTMCNC: 32.4 PG — SIGNIFICANT CHANGE UP (ref 27–34)
MCHC RBC-ENTMCNC: 32.4 PG — SIGNIFICANT CHANGE UP (ref 27–34)
MCHC RBC-ENTMCNC: 32.9 GM/DL — SIGNIFICANT CHANGE UP (ref 32–36)
MCHC RBC-ENTMCNC: 32.9 GM/DL — SIGNIFICANT CHANGE UP (ref 32–36)
MCV RBC AUTO: 98.3 FL — SIGNIFICANT CHANGE UP (ref 80–100)
MCV RBC AUTO: 98.3 FL — SIGNIFICANT CHANGE UP (ref 80–100)
NRBC # BLD: 0 /100 WBCS — SIGNIFICANT CHANGE UP (ref 0–0)
NRBC # BLD: 0 /100 WBCS — SIGNIFICANT CHANGE UP (ref 0–0)
PLATELET # BLD AUTO: 174 K/UL — SIGNIFICANT CHANGE UP (ref 150–400)
PLATELET # BLD AUTO: 174 K/UL — SIGNIFICANT CHANGE UP (ref 150–400)
POTASSIUM SERPL-MCNC: 3.8 MMOL/L — SIGNIFICANT CHANGE UP (ref 3.5–5.3)
POTASSIUM SERPL-MCNC: 3.8 MMOL/L — SIGNIFICANT CHANGE UP (ref 3.5–5.3)
POTASSIUM SERPL-SCNC: 3.8 MMOL/L — SIGNIFICANT CHANGE UP (ref 3.5–5.3)
POTASSIUM SERPL-SCNC: 3.8 MMOL/L — SIGNIFICANT CHANGE UP (ref 3.5–5.3)
PROCALCITONIN SERPL-MCNC: 41.75 NG/ML — HIGH (ref 0.02–0.1)
PROCALCITONIN SERPL-MCNC: 41.75 NG/ML — HIGH (ref 0.02–0.1)
RBC # BLD: 3.46 M/UL — LOW (ref 4.2–5.8)
RBC # BLD: 3.46 M/UL — LOW (ref 4.2–5.8)
RBC # FLD: 13 % — SIGNIFICANT CHANGE UP (ref 10.3–14.5)
RBC # FLD: 13 % — SIGNIFICANT CHANGE UP (ref 10.3–14.5)
SODIUM SERPL-SCNC: 138 MMOL/L — SIGNIFICANT CHANGE UP (ref 135–145)
SODIUM SERPL-SCNC: 138 MMOL/L — SIGNIFICANT CHANGE UP (ref 135–145)
WBC # BLD: 9.04 K/UL — SIGNIFICANT CHANGE UP (ref 3.8–10.5)
WBC # BLD: 9.04 K/UL — SIGNIFICANT CHANGE UP (ref 3.8–10.5)
WBC # FLD AUTO: 9.04 K/UL — SIGNIFICANT CHANGE UP (ref 3.8–10.5)
WBC # FLD AUTO: 9.04 K/UL — SIGNIFICANT CHANGE UP (ref 3.8–10.5)

## 2023-11-25 RX ORDER — VALSARTAN 80 MG/1
40 TABLET ORAL
Refills: 0 | Status: DISCONTINUED | OUTPATIENT
Start: 2023-11-25 | End: 2023-11-28

## 2023-11-25 RX ADMIN — SENNA PLUS 2 TABLET(S): 8.6 TABLET ORAL at 21:55

## 2023-11-25 RX ADMIN — Medication 3 MILLILITER(S): at 05:16

## 2023-11-25 RX ADMIN — INSULIN GLARGINE 15 UNIT(S): 100 INJECTION, SOLUTION SUBCUTANEOUS at 21:57

## 2023-11-25 RX ADMIN — Medication 7 UNIT(S): at 17:17

## 2023-11-25 RX ADMIN — Medication 100 MILLIGRAM(S): at 01:15

## 2023-11-25 RX ADMIN — Medication 7 UNIT(S): at 09:50

## 2023-11-25 RX ADMIN — CEFTRIAXONE 100 MILLIGRAM(S): 500 INJECTION, POWDER, FOR SOLUTION INTRAMUSCULAR; INTRAVENOUS at 07:48

## 2023-11-25 RX ADMIN — CLOPIDOGREL BISULFATE 75 MILLIGRAM(S): 75 TABLET, FILM COATED ORAL at 12:05

## 2023-11-25 RX ADMIN — ATORVASTATIN CALCIUM 80 MILLIGRAM(S): 80 TABLET, FILM COATED ORAL at 21:55

## 2023-11-25 RX ADMIN — HEPARIN SODIUM 5000 UNIT(S): 5000 INJECTION INTRAVENOUS; SUBCUTANEOUS at 17:16

## 2023-11-25 RX ADMIN — VALSARTAN 40 MILLIGRAM(S): 80 TABLET ORAL at 05:19

## 2023-11-25 RX ADMIN — SENNA PLUS 2 TABLET(S): 8.6 TABLET ORAL at 01:15

## 2023-11-25 RX ADMIN — HEPARIN SODIUM 5000 UNIT(S): 5000 INJECTION INTRAVENOUS; SUBCUTANEOUS at 05:17

## 2023-11-25 RX ADMIN — Medication 3 MILLILITER(S): at 12:07

## 2023-11-25 RX ADMIN — SEVELAMER CARBONATE 800 MILLIGRAM(S): 2400 POWDER, FOR SUSPENSION ORAL at 05:16

## 2023-11-25 RX ADMIN — Medication 2: at 17:16

## 2023-11-25 RX ADMIN — VALSARTAN 40 MILLIGRAM(S): 80 TABLET ORAL at 17:15

## 2023-11-25 RX ADMIN — Medication 81 MILLIGRAM(S): at 12:00

## 2023-11-25 RX ADMIN — Medication 100 MILLIGRAM(S): at 12:07

## 2023-11-25 RX ADMIN — SEVELAMER CARBONATE 800 MILLIGRAM(S): 2400 POWDER, FOR SUSPENSION ORAL at 21:56

## 2023-11-25 RX ADMIN — Medication 100 MILLIGRAM(S): at 12:08

## 2023-11-25 RX ADMIN — CARVEDILOL PHOSPHATE 12.5 MILLIGRAM(S): 80 CAPSULE, EXTENDED RELEASE ORAL at 05:21

## 2023-11-25 RX ADMIN — Medication 100 MILLIGRAM(S): at 21:54

## 2023-11-25 RX ADMIN — Medication 7 UNIT(S): at 12:00

## 2023-11-25 RX ADMIN — GABAPENTIN 100 MILLIGRAM(S): 400 CAPSULE ORAL at 05:15

## 2023-11-25 RX ADMIN — Medication 100 MILLIGRAM(S): at 05:14

## 2023-11-25 RX ADMIN — ATORVASTATIN CALCIUM 80 MILLIGRAM(S): 80 TABLET, FILM COATED ORAL at 01:15

## 2023-11-25 RX ADMIN — Medication 3 MILLILITER(S): at 17:16

## 2023-11-25 RX ADMIN — SEVELAMER CARBONATE 800 MILLIGRAM(S): 2400 POWDER, FOR SUSPENSION ORAL at 01:16

## 2023-11-25 RX ADMIN — Medication 2: at 09:49

## 2023-11-25 RX ADMIN — CARVEDILOL PHOSPHATE 12.5 MILLIGRAM(S): 80 CAPSULE, EXTENDED RELEASE ORAL at 17:16

## 2023-11-25 RX ADMIN — GABAPENTIN 100 MILLIGRAM(S): 400 CAPSULE ORAL at 17:15

## 2023-11-25 RX ADMIN — SERTRALINE 25 MILLIGRAM(S): 25 TABLET, FILM COATED ORAL at 12:06

## 2023-11-25 RX ADMIN — Medication 600 MILLIGRAM(S): at 17:16

## 2023-11-25 RX ADMIN — Medication 3 MILLILITER(S): at 23:03

## 2023-11-25 RX ADMIN — Medication 1: at 11:59

## 2023-11-25 RX ADMIN — SEVELAMER CARBONATE 800 MILLIGRAM(S): 2400 POWDER, FOR SUSPENSION ORAL at 12:07

## 2023-11-25 RX ADMIN — Medication 600 MILLIGRAM(S): at 05:15

## 2023-11-25 NOTE — PROGRESS NOTE ADULT - ASSESSMENT
63    yr      hx CAD   s/p  2 drug eluding stents 11/2022       CHF, DM, gout, ESRD on dialysis      c/c  weakness  of   limbs/  neuro dr mccain/ h/o   gout, right  wrist,  has  functional  quadriplegia/  CT  head, . 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      pt  with  h/o intermittent  right  arm /leg weakness  has been   c/c  for  yrs/  with  unclear  etiology / MRI  head/  c spine in 10/22,  no cord  compression       seen by neuro in  past,   per  wife,  thinks  pt  has  a psychological  component  regarding  his  weakness/  s/p Strep pneumoniae  bacteremia  and pna           now admitted  with fevers/   sob,  from viral  sx          RSV  +/  supportive care.  appears  well.  not hypoxic . seen  by ID            on  iv rocephin/ CT  chest,  pna.  and  per  ID,  ab  not  needed     Gout    CAD,    s/p    pci.        on  asa/ plavix/   lipitor,          ef  45. on 5/.23     CKD, on HD           renal  dr dickerson      c/c  anemia     DM,  follow  fs           on lantus     on  dvt ppx/ gabapentin,  asa.  lipitor,  coreg., valsartan, , lantus,  zolofr. allopurinol       rad< from: TTE W or WO Ultrasound Enhancing Agent (05.24.23 @ 12:00) >  CONCLUSIONS:     1. The left ventricular systolic function is mildly decreased with an ejection fraction visually estimated at 45 to 50 %. There are regional wall motion abnormalities.   2. Mid and apical anterior septum and apex are abnormal.   3. Normal systolic function. The tricuspid annular plane systolic excursion (TAPSE) is 1.8 cm (normal >=1.7 cm).   4. No prior echocardiogram is available for comparison.    _____________________________________________________________  < end of copied text >

## 2023-11-25 NOTE — PROGRESS NOTE ADULT - SUBJECTIVE AND OBJECTIVE BOX
date of service: 11-25-23 @ 11:07  afberile  REVIEW OF SYSTEMS:  CONSTITUTIONAL: No fever,  no  weight loss  ENT:  No  tinnitus,   no   vertigo  NECK: No pain or stiffness  RESPIRATORY: No cough, wheezing, chills or hemoptysis;    No Shortness of Breath  CARDIOVASCULAR: No chest pain, palpitations, dizziness  GASTROINTESTINAL: No abdominal or epigastric pain. No nausea, vomiting, or hematemesis; No diarrhea  No melena or hematochezia.  GENITOURINARY: No dysuria, frequency, hematuria, or incontinence  NEUROLOGICAL: No headaches  SKIN: No itching,  no   rash  LYMPH Nodes: No enlarged glands  ENDOCRINE: No heat or cold intolerance  MUSCULOSKELETAL: No joint pain or swelling  PSYCHIATRIC: No depression, anxiety  HEME/LYMPH: No easy bruising, or bleeding gums  ALLERGY AND IMMUNOLOGIC: No hives or eczema	    MEDICATIONS  (STANDING):  albuterol    90 MICROgram(s) HFA Inhaler 2 Puff(s) Inhalation every 8 hours  albuterol/ipratropium for Nebulization 3 milliLiter(s) Nebulizer every 6 hours  allopurinol 100 milliGRAM(s) Oral daily  aspirin  chewable 81 milliGRAM(s) Oral daily  atorvastatin 80 milliGRAM(s) Oral at bedtime  benzonatate 100 milliGRAM(s) Oral three times a day  carvedilol 12.5 milliGRAM(s) Oral every 12 hours  chlorhexidine 4% Liquid 1 Application(s) Topical <User Schedule>  clopidogrel Tablet 75 milliGRAM(s) Oral daily  dextrose 5%. 1000 milliLiter(s) (100 mL/Hr) IV Continuous <Continuous>  dextrose 5%. 1000 milliLiter(s) (50 mL/Hr) IV Continuous <Continuous>  dextrose 50% Injectable 25 Gram(s) IV Push once  dextrose 50% Injectable 12.5 Gram(s) IV Push once  dextrose 50% Injectable 25 Gram(s) IV Push once  gabapentin 100 milliGRAM(s) Oral two times a day  glucagon  Injectable 1 milliGRAM(s) IntraMuscular once  guaiFENesin  milliGRAM(s) Oral every 12 hours  heparin   Injectable 5000 Unit(s) SubCutaneous every 12 hours  influenza   Vaccine 0.5 milliLiter(s) IntraMuscular once  insulin glargine Injectable (LANTUS) 15 Unit(s) SubCutaneous at bedtime  insulin lispro (ADMELOG) corrective regimen sliding scale   SubCutaneous three times a day before meals  insulin lispro (ADMELOG) corrective regimen sliding scale   SubCutaneous at bedtime  insulin lispro Injectable (ADMELOG) 7 Unit(s) SubCutaneous three times a day before meals  senna 2 Tablet(s) Oral at bedtime  sertraline 25 milliGRAM(s) Oral daily  sevelamer carbonate 800 milliGRAM(s) Oral three times a day  valsartan 40 milliGRAM(s) Oral two times a day    MEDICATIONS  (PRN):  dextrose Oral Gel 15 Gram(s) Oral once PRN Blood Glucose LESS THAN 70 milliGRAM(s)/deciliter      Vital Signs Last 24 Hrs  T(C): 36.4 (24 Nov 2023 22:08), Max: 36.8 (24 Nov 2023 19:08)  T(F): 97.5 (24 Nov 2023 22:08), Max: 98.2 (24 Nov 2023 19:08)  HR: 96 (25 Nov 2023 04:47) (95 - 108)  BP: 170/82 (25 Nov 2023 04:47) (121/64 - 189/90)  BP(mean): --  RR: 18 (25 Nov 2023 04:47) (16 - 18)  SpO2: 96% (25 Nov 2023 04:47) (96% - 97%)    Parameters below as of 25 Nov 2023 04:47  Patient On (Oxygen Delivery Method): room air      CAPILLARY BLOOD GLUCOSE      POCT Blood Glucose.: 158 mg/dL (25 Nov 2023 11:02)  POCT Blood Glucose.: 238 mg/dL (25 Nov 2023 07:36)  POCT Blood Glucose.: 177 mg/dL (25 Nov 2023 03:45)  POCT Blood Glucose.: 331 mg/dL (24 Nov 2023 18:02)  POCT Blood Glucose.: 412 mg/dL (24 Nov 2023 11:11)  POCT Blood Glucose.: 415 mg/dL (24 Nov 2023 11:10)    I&O's Summary    24 Nov 2023 07:01  -  25 Nov 2023 07:00  --------------------------------------------------------  IN: 360 mL / OUT: 3150 mL / NET: -2790 mL    25 Nov 2023 07:01  -  25 Nov 2023 11:07  --------------------------------------------------------  IN: 210 mL / OUT: 0 mL / NET: 210 mL          Appearance: Normal	  HEENT:   Normal oral mucosa, PERRL, EOMI	  Lymphatic: No lymphadenopathy  Cardiovascular: Normal S1 S2, No JVD  Respiratory: Lungs clear to auscultation	  Gastrointestinal:  Soft, Non-tender, + BS	  Skin: No rash, No ecchymoses	  Extremities:     LABS:                        11.2   9.04  )-----------( 174      ( 25 Nov 2023 06:26 )             34.0     11-25    138  |  92<L>  |  44<H>  ----------------------------<  187<H>  3.8   |  23  |  6.31<H>    Ca    8.8      25 Nov 2023 06:26  Phos  5.3     11-23  Mg     2.4     11-23    TPro  6.6  /  Alb  3.4  /  TBili  0.3  /  DBili  x   /  AST  17  /  ALT  13  /  AlkPhos  87  11-23    PT/INR - ( 23 Nov 2023 18:24 )   PT: 11.2 sec;   INR: 1.02 ratio         PTT - ( 23 Nov 2023 18:24 )  PTT:33.6 sec      Urinalysis Basic - ( 25 Nov 2023 06:26 )    Color: x / Appearance: x / SG: x / pH: x  Gluc: 187 mg/dL / Ketone: x  / Bili: x / Urobili: x   Blood: x / Protein: x / Nitrite: x   Leuk Esterase: x / RBC: x / WBC x   Sq Epi: x / Non Sq Epi: x / Bacteria: x          11-23 @ 18:23  6.5  63              Consultant(s) Notes Reviewed:      Care Discussed with Consultants/Other Providers:

## 2023-11-25 NOTE — PROGRESS NOTE ADULT - SUBJECTIVE AND OBJECTIVE BOX
Date of Service: 11-25-23 @ 09:40           CARDIOLOGY     PROGRESS  NOTE   ________________________________________________    CHIEF COMPLAINT:Patient is a 63y old  Male who presents with a chief complaint of sob (24 Nov 2023 16:12)  no complain  	  REVIEW OF SYSTEMS:  CONSTITUTIONAL: No fever, weight loss, or fatigue  EYES: No eye pain, visual disturbances, or discharge  ENT:  No difficulty hearing, tinnitus, vertigo; No sinus or throat pain  NECK: No pain or stiffness  RESPIRATORY: No cough, wheezing, chills or hemoptysis; + Shortness of Breath  CARDIOVASCULAR: No chest pain, palpitations, passing out, dizziness, or leg swelling  GASTROINTESTINAL: No abdominal or epigastric pain. No nausea, vomiting, or hematemesis; No diarrhea or constipation. No melena or hematochezia.  GENITOURINARY: No dysuria, frequency, hematuria, or incontinence  NEUROLOGICAL: No headaches, memory loss, loss of strength, numbness, or tremors  SKIN: No itching, burning, rashes, or lesions   LYMPH Nodes: No enlarged glands  ENDOCRINE: No heat or cold intolerance; No hair loss  MUSCULOSKELETAL: No joint pain or swelling; No muscle, back, or extremity pain  PSYCHIATRIC: No depression, anxiety, mood swings, or difficulty sleeping  HEME/LYMPH: No easy bruising, or bleeding gums  ALLERGY AND IMMUNOLOGIC: No hives or eczema	    [x ] All others negative	  [ ] Unable to obtain    PHYSICAL EXAM:  T(C): 36.4 (11-24-23 @ 22:08), Max: 36.8 (11-24-23 @ 19:08)  HR: 96 (11-25-23 @ 04:47) (95 - 108)  BP: 170/82 (11-25-23 @ 04:47) (121/64 - 189/90)  RR: 18 (11-25-23 @ 04:47) (16 - 18)  SpO2: 96% (11-25-23 @ 04:47) (96% - 97%)  Wt(kg): --  I&O's Summary    24 Nov 2023 07:01  -  25 Nov 2023 07:00  --------------------------------------------------------  IN: 360 mL / OUT: 3150 mL / NET: -2790 mL        Appearance: Normal	  HEENT:   Normal oral mucosa, PERRL, EOMI	  Lymphatic: No lymphadenopathy  Cardiovascular: Normal S1 S2, No JVD, + murmurs, No edema  Respiratory: rhonchi  Psychiatry: A & O x 3, Mood & affect appropriate  Gastrointestinal:  Soft, Non-tender, + BS	  Skin: No rashes, No ecchymoses, No cyanosis	  Neurologic: Non-focal  Extremities: Normal range of motion, No clubbing, cyanosis or edema  Vascular: Peripheral pulses palpable 2+ bilaterally    MEDICATIONS  (STANDING):  albuterol    90 MICROgram(s) HFA Inhaler 2 Puff(s) Inhalation every 8 hours  albuterol/ipratropium for Nebulization 3 milliLiter(s) Nebulizer every 6 hours  allopurinol 100 milliGRAM(s) Oral daily  aspirin  chewable 81 milliGRAM(s) Oral daily  atorvastatin 80 milliGRAM(s) Oral at bedtime  benzonatate 100 milliGRAM(s) Oral three times a day  carvedilol 12.5 milliGRAM(s) Oral every 12 hours  cefTRIAXone   IVPB 1000 milliGRAM(s) IV Intermittent every 24 hours  chlorhexidine 4% Liquid 1 Application(s) Topical <User Schedule>  clopidogrel Tablet 75 milliGRAM(s) Oral daily  dextrose 5%. 1000 milliLiter(s) (50 mL/Hr) IV Continuous <Continuous>  dextrose 5%. 1000 milliLiter(s) (100 mL/Hr) IV Continuous <Continuous>  dextrose 50% Injectable 25 Gram(s) IV Push once  dextrose 50% Injectable 12.5 Gram(s) IV Push once  dextrose 50% Injectable 25 Gram(s) IV Push once  gabapentin 100 milliGRAM(s) Oral two times a day  glucagon  Injectable 1 milliGRAM(s) IntraMuscular once  guaiFENesin  milliGRAM(s) Oral every 12 hours  heparin   Injectable 5000 Unit(s) SubCutaneous every 12 hours  influenza   Vaccine 0.5 milliLiter(s) IntraMuscular once  insulin glargine Injectable (LANTUS) 15 Unit(s) SubCutaneous at bedtime  insulin lispro (ADMELOG) corrective regimen sliding scale   SubCutaneous at bedtime  insulin lispro (ADMELOG) corrective regimen sliding scale   SubCutaneous three times a day before meals  insulin lispro Injectable (ADMELOG) 7 Unit(s) SubCutaneous three times a day before meals  senna 2 Tablet(s) Oral at bedtime  sertraline 25 milliGRAM(s) Oral daily  sevelamer carbonate 800 milliGRAM(s) Oral three times a day  valsartan 40 milliGRAM(s) Oral daily      TELEMETRY: 	    ECG:  	  RADIOLOGY:  OTHER: 	  	  LABS:	 	    CARDIAC MARKERS:                                11.2   9.04  )-----------( 174      ( 25 Nov 2023 06:26 )             34.0     11-25    138  |  92<L>  |  44<H>  ----------------------------<  187<H>  3.8   |  23  |  6.31<H>    Ca    8.8      25 Nov 2023 06:26  Phos  5.3     11-23  Mg     2.4     11-23    TPro  6.6  /  Alb  3.4  /  TBili  0.3  /  DBili  x   /  AST  17  /  ALT  13  /  AlkPhos  87  11-23    proBNP:   Lipid Profile:   HgA1c:   TSH:   PT/INR - ( 23 Nov 2023 18:24 )   PT: 11.2 sec;   INR: 1.02 ratio         PTT - ( 23 Nov 2023 18:24 )  PTT:33.6 sec    #Fever, shortness of breath, cough  #RSV infection  #ESRD on HD  Patient feeling better since admission  No evidence for consolidation on CXR  Continus to require high flow oxygen otherwise afebrile, no leukocytosis      Assessment and plan  ---------------------------  62yo M, hx of ESRD on HD MWF, CAD s/p stents, CHF, DM, Brought in by EMS for shortness of breath.  Patient reported not feeling well for the past 2 to 3 days with productive cough.  Per EMS febrile, Tmax 103.  On scene, patient was hypoxic, satting around 88 to 92% on room air.  This improved with fall liters nasal cannula.  Patient also has positive vomiting, however denies chest pain or shortness of breath at the moment.  per EMS, family states this is similar to his previous presentation for sepsis or pneumonia. History limited as patient is lethargic of note, full run of dialysis is on Monday.  pt with hx of ASHD ESRD on HD CHF, DM with increasing sob, pt had a recent  visit in my office ,now with increasing sob.  rvp is pos RSV  ID has called, consult noted  ESRD on HD  continue all cardiac meds, plavix with sig cad  dvt prophylaxis  will adjust bp meds  oob to chair  dvt prophylaxis

## 2023-11-26 DIAGNOSIS — E11.65 TYPE 2 DIABETES MELLITUS WITH HYPERGLYCEMIA: ICD-10-CM

## 2023-11-26 LAB
ANION GAP SERPL CALC-SCNC: 17 MMOL/L — SIGNIFICANT CHANGE UP (ref 5–17)
ANION GAP SERPL CALC-SCNC: 17 MMOL/L — SIGNIFICANT CHANGE UP (ref 5–17)
BUN SERPL-MCNC: 70 MG/DL — HIGH (ref 7–23)
BUN SERPL-MCNC: 70 MG/DL — HIGH (ref 7–23)
CALCIUM SERPL-MCNC: 8.1 MG/DL — LOW (ref 8.4–10.5)
CALCIUM SERPL-MCNC: 8.1 MG/DL — LOW (ref 8.4–10.5)
CHLORIDE SERPL-SCNC: 91 MMOL/L — LOW (ref 96–108)
CHLORIDE SERPL-SCNC: 91 MMOL/L — LOW (ref 96–108)
CO2 SERPL-SCNC: 27 MMOL/L — SIGNIFICANT CHANGE UP (ref 22–31)
CO2 SERPL-SCNC: 27 MMOL/L — SIGNIFICANT CHANGE UP (ref 22–31)
CREAT SERPL-MCNC: 8.52 MG/DL — HIGH (ref 0.5–1.3)
CREAT SERPL-MCNC: 8.52 MG/DL — HIGH (ref 0.5–1.3)
EGFR: 6 ML/MIN/1.73M2 — LOW
EGFR: 6 ML/MIN/1.73M2 — LOW
GLUCOSE BLDC GLUCOMTR-MCNC: 158 MG/DL — HIGH (ref 70–99)
GLUCOSE BLDC GLUCOMTR-MCNC: 158 MG/DL — HIGH (ref 70–99)
GLUCOSE BLDC GLUCOMTR-MCNC: 167 MG/DL — HIGH (ref 70–99)
GLUCOSE BLDC GLUCOMTR-MCNC: 167 MG/DL — HIGH (ref 70–99)
GLUCOSE BLDC GLUCOMTR-MCNC: 184 MG/DL — HIGH (ref 70–99)
GLUCOSE BLDC GLUCOMTR-MCNC: 184 MG/DL — HIGH (ref 70–99)
GLUCOSE BLDC GLUCOMTR-MCNC: 203 MG/DL — HIGH (ref 70–99)
GLUCOSE BLDC GLUCOMTR-MCNC: 203 MG/DL — HIGH (ref 70–99)
GLUCOSE SERPL-MCNC: 236 MG/DL — HIGH (ref 70–99)
GLUCOSE SERPL-MCNC: 236 MG/DL — HIGH (ref 70–99)
POTASSIUM SERPL-MCNC: 4.3 MMOL/L — SIGNIFICANT CHANGE UP (ref 3.5–5.3)
POTASSIUM SERPL-MCNC: 4.3 MMOL/L — SIGNIFICANT CHANGE UP (ref 3.5–5.3)
POTASSIUM SERPL-SCNC: 4.3 MMOL/L — SIGNIFICANT CHANGE UP (ref 3.5–5.3)
POTASSIUM SERPL-SCNC: 4.3 MMOL/L — SIGNIFICANT CHANGE UP (ref 3.5–5.3)
SODIUM SERPL-SCNC: 135 MMOL/L — SIGNIFICANT CHANGE UP (ref 135–145)
SODIUM SERPL-SCNC: 135 MMOL/L — SIGNIFICANT CHANGE UP (ref 135–145)

## 2023-11-26 PROCEDURE — 99232 SBSQ HOSP IP/OBS MODERATE 35: CPT

## 2023-11-26 RX ADMIN — Medication 7 UNIT(S): at 17:14

## 2023-11-26 RX ADMIN — INSULIN GLARGINE 15 UNIT(S): 100 INJECTION, SOLUTION SUBCUTANEOUS at 21:48

## 2023-11-26 RX ADMIN — SEVELAMER CARBONATE 800 MILLIGRAM(S): 2400 POWDER, FOR SUSPENSION ORAL at 05:32

## 2023-11-26 RX ADMIN — HEPARIN SODIUM 5000 UNIT(S): 5000 INJECTION INTRAVENOUS; SUBCUTANEOUS at 17:55

## 2023-11-26 RX ADMIN — Medication 7 UNIT(S): at 11:52

## 2023-11-26 RX ADMIN — ATORVASTATIN CALCIUM 80 MILLIGRAM(S): 80 TABLET, FILM COATED ORAL at 21:21

## 2023-11-26 RX ADMIN — CLOPIDOGREL BISULFATE 75 MILLIGRAM(S): 75 TABLET, FILM COATED ORAL at 11:51

## 2023-11-26 RX ADMIN — VALSARTAN 40 MILLIGRAM(S): 80 TABLET ORAL at 05:58

## 2023-11-26 RX ADMIN — SERTRALINE 25 MILLIGRAM(S): 25 TABLET, FILM COATED ORAL at 12:29

## 2023-11-26 RX ADMIN — CARVEDILOL PHOSPHATE 12.5 MILLIGRAM(S): 80 CAPSULE, EXTENDED RELEASE ORAL at 05:34

## 2023-11-26 RX ADMIN — Medication 600 MILLIGRAM(S): at 17:55

## 2023-11-26 RX ADMIN — SEVELAMER CARBONATE 800 MILLIGRAM(S): 2400 POWDER, FOR SUSPENSION ORAL at 21:20

## 2023-11-26 RX ADMIN — CHLORHEXIDINE GLUCONATE 1 APPLICATION(S): 213 SOLUTION TOPICAL at 05:58

## 2023-11-26 RX ADMIN — Medication 100 MILLIGRAM(S): at 12:30

## 2023-11-26 RX ADMIN — CARVEDILOL PHOSPHATE 12.5 MILLIGRAM(S): 80 CAPSULE, EXTENDED RELEASE ORAL at 17:55

## 2023-11-26 RX ADMIN — Medication 1: at 11:51

## 2023-11-26 RX ADMIN — Medication 100 MILLIGRAM(S): at 14:06

## 2023-11-26 RX ADMIN — Medication 600 MILLIGRAM(S): at 05:33

## 2023-11-26 RX ADMIN — VALSARTAN 40 MILLIGRAM(S): 80 TABLET ORAL at 17:54

## 2023-11-26 RX ADMIN — SEVELAMER CARBONATE 800 MILLIGRAM(S): 2400 POWDER, FOR SUSPENSION ORAL at 14:06

## 2023-11-26 RX ADMIN — Medication 1: at 17:14

## 2023-11-26 RX ADMIN — HEPARIN SODIUM 5000 UNIT(S): 5000 INJECTION INTRAVENOUS; SUBCUTANEOUS at 05:32

## 2023-11-26 RX ADMIN — GABAPENTIN 100 MILLIGRAM(S): 400 CAPSULE ORAL at 17:55

## 2023-11-26 RX ADMIN — Medication 81 MILLIGRAM(S): at 12:29

## 2023-11-26 RX ADMIN — Medication 2: at 09:00

## 2023-11-26 RX ADMIN — Medication 100 MILLIGRAM(S): at 05:34

## 2023-11-26 RX ADMIN — Medication 100 MILLIGRAM(S): at 21:20

## 2023-11-26 RX ADMIN — Medication 7 UNIT(S): at 09:01

## 2023-11-26 RX ADMIN — GABAPENTIN 100 MILLIGRAM(S): 400 CAPSULE ORAL at 05:34

## 2023-11-26 NOTE — PROGRESS NOTE ADULT - ASSESSMENT
"IR MECHANICAL THROMBECTOMY - PRIMARY  Procedure Note    Florinda Knapp  2/19/2017    Pre-op Diagnosis:   1.  Acute CVA, left thromboembolism    Post-op Diagnosis:     1.  Acute CVA, left thromboembolism    Procedure/CPT® Codes:  73320      Procedure(s):  Diagnostic left internal carotid angiography    Surgeon(s):  Francisco Javier Gabriel MD    Anesthesia: Sedation    Staff:   Scrub Person: Eddy Stewart  Documenter: Janna Portillo  Invasive Nurse: Cyndi Coffey RN    Estimated Blood Loss: less than 20 mL    Specimens:                none      Drains:    n/a       Findings: The patient has a TypeIII aortic arch, making cannulation of the left common carotid artery challenging.  However, diagnostic angiography demonstrated partial interval thrombolyzed is of distal M1 segment left MCA thromboembolism following IV TPA therapy.  Initially, it was planned to pursue mechanical thrombectomy for residual thrombus, but following exchange of catheters to access the distal internal carotid artery, angiography demonstrated further interval lysis of thrombus with only a small amount of residual \"distal\" clots.  This represents restoration of TICI 2B flow (near normal) to the left MCA vascular territory following IV TPA therapy.  At this point, no further intervention was deemed warranted.    Complications: None apparent.      Francisco Javier Gabriel MD     Date: 2/19/2017  Time: 12:37 PM      "     63    yr      hx CAD   s/p  2 drug eluding stents 11/2022       CHF, DM, gout, ESRD on dialysis      c/c  weakness  of   limbs/  neuro dr mccain/ h/o   gout, right  wrist,  has  functional  quadriplegia/  CT  head, . 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      pt  with  h/o intermittent  right  arm /leg weakness  has been   c/c  for  yrs/  with  unclear  etiology / MRI  head/  c spine in 10/22,  no cord  compression       seen by neuro in  past,   per  wife,  thinks  pt  has  a psychological  component  regarding  his  weakness/  s/p Strep pneumoniae  bacteremia  and pna           now admitted  with fevers/   sob,  from viral  sx          RSV  +/  supportive care.  appears  well.  not hypoxic . seen  by ID            on  iv rocephin/ CT  chest,  pna.  and  per  ID,  ab  not  needed     Gout    CAD,    s/p    pci.        on  asa/ plavix/   lipitor,          ef  45. on 5/.23     CKD, on HD           renal  dr dickerson      c/c  anemia     DM,  follow  fs           on lantus     on  dvt ppx/ gabapentin,  asa.  lipitor,  coreg., valsartan, , lantus,  zolofr. allopurinol  RSV.  supportive  care/ PT  eval       rad< from: TTE W or WO Ultrasound Enhancing Agent (05.24.23 @ 12:00) >  CONCLUSIONS:     1. The left ventricular systolic function is mildly decreased with an ejection fraction visually estimated at 45 to 50 %. There are regional wall motion abnormalities.   2. Mid and apical anterior septum and apex are abnormal.   3. Normal systolic function. The tricuspid annular plane systolic excursion (TAPSE) is 1.8 cm (normal >=1.7 cm).   4. No prior echocardiogram is available for comparison.    _____________________________________________________________  < end of copied text >

## 2023-11-26 NOTE — PROGRESS NOTE ADULT - SUBJECTIVE AND OBJECTIVE BOX
HPI: Patient is a 63 M with history of ESRD on HD, CAD s/p stent, CHF, DM here for SOB, found to have RSV infection. Endocrinology consulted for diabetes management.     Diabetes history:  Regarding type 2 diabetes, diagnosed many years ago.  Complicated by diabetic retinopathy, diabetic neuropathy and diabetic nephropathy.  Currently on hemodialysis.  Also complicated by history of CAD and CHF. Follows with Dr. Hernandez for diabetes management.     Takes Toujeo and Humalog at home. Glucose at home usually in the 200s. denies hypoglycemia. A1C noted to be 9.5,     A1C 9.5 but may be inaccurate secondary to history of ESRD     Home DM medications:  - Toujeo 20 u nits at bedtime   - Humalog  10 units 3 times daily with meals   Current inpatient DM Meds:   Glargine 15 units, premeal lispro 7  Pt states feeling generally better. Good appetite, no hypos. persistent cough    PAST MEDICAL/SURGICAL/FAMILY/SOCIAL HISTORY:   Past Medical, Past Surgical, and Family History:  PAST MEDICAL HISTORY:  ASHD (arteriosclerotic heart disease)   BPH (benign prostatic hyperplasia)   CAD (coronary artery disease)   CKD (chronic kidney disease)   Diabetes Mellitus Type II, Uncontrolled   Diabetic retinopathy   Dyslipidemia   GERD (gastroesophageal reflux disease)   Gout   Hepatitis   HTN (hypertension)   Ischemic cardiomyopathy   Myocardial infarction   Nephrolithiasis   Pulmonary hypertension   s/p Angioplasty with Stent   Vitamin D deficiency.     PAST SURGICAL HISTORY:  CHF with cardiomyopathy Insertion of Cardiomems monitor    Diabetes with retinopathy S/P PPV/PRP/GAS  OD 2/22/17 for viterous hemorrhage  H/O lithotripsy   History of eye surgery left eye  Retinal Hemorrhage   S/P coronary artery stent placement 2010 TRICIA to Diag ; 11/18/2010  LAD; 2/4/11 ATOM liberte Diag; 5/15/2017  TRICIA to 1st diag; 6/7/17 PCI with laser and high pressure balloon  MEDICATIONS  (STANDING):  albuterol    90 MICROgram(s) HFA Inhaler 2 Puff(s) Inhalation every 8 hours  albuterol/ipratropium for Nebulization 3 milliLiter(s) Nebulizer every 6 hours  allopurinol 100 milliGRAM(s) Oral daily  aspirin  chewable 81 milliGRAM(s) Oral daily  atorvastatin 80 milliGRAM(s) Oral at bedtime  benzonatate 100 milliGRAM(s) Oral three times a day  carvedilol 12.5 milliGRAM(s) Oral every 12 hours  chlorhexidine 4% Liquid 1 Application(s) Topical <User Schedule>  clopidogrel Tablet 75 milliGRAM(s) Oral daily  dextrose 5%. 1000 milliLiter(s) (50 mL/Hr) IV Continuous <Continuous>  dextrose 5%. 1000 milliLiter(s) (100 mL/Hr) IV Continuous <Continuous>  dextrose 50% Injectable 25 Gram(s) IV Push once  dextrose 50% Injectable 12.5 Gram(s) IV Push once  dextrose 50% Injectable 25 Gram(s) IV Push once  gabapentin 100 milliGRAM(s) Oral two times a day  glucagon  Injectable 1 milliGRAM(s) IntraMuscular once  guaiFENesin  milliGRAM(s) Oral every 12 hours  heparin   Injectable 5000 Unit(s) SubCutaneous every 12 hours  influenza   Vaccine 0.5 milliLiter(s) IntraMuscular once  insulin glargine Injectable (LANTUS) 15 Unit(s) SubCutaneous at bedtime  insulin lispro (ADMELOG) corrective regimen sliding scale   SubCutaneous at bedtime  insulin lispro (ADMELOG) corrective regimen sliding scale   SubCutaneous three times a day before meals  insulin lispro Injectable (ADMELOG) 7 Unit(s) SubCutaneous three times a day before meals  senna 2 Tablet(s) Oral at bedtime  sertraline 25 milliGRAM(s) Oral daily  sevelamer carbonate 800 milliGRAM(s) Oral three times a day  valsartan 40 milliGRAM(s) Oral two times a day    MEDICATIONS  (PRN):  dextrose Oral Gel 15 Gram(s) Oral once PRN Blood Glucose LESS THAN 70 milliGRAM(s)/deciliter      Allergies    shellfish (Unknown)  No Known Drug Allergies  PHYSICAL EXAM:  VITALS: T(C): 36.7 (11-26-23 @ 11:55)  T(F): 98 (11-26-23 @ 11:55), Max: 99.1 (11-25-23 @ 19:37)  HR: 71 (11-26-23 @ 11:55) (71 - 85)  BP: 148/79 (11-26-23 @ 11:55) (133/68 - 153/76)  RR:  (18 - 18)  SpO2:  (91% - 96%)  Wt(kg): -79mkg-  GENERAL: NAD, well-groomed, well-developed  RESPIRATORY: Clear to auscultation bilaterally; No rales, rhonchi, wheezing, or rubs  CARDIOVASCULAR: Regular rate and rhythm; No murmurs; no peripheral edema  GI: Soft, nontender, non distended, normal bowel sounds  SKIN: Dry, intact, No rashes or lesions  PSYCH: Alert and oriented x 3, normal affect, normal mood  CUSHING'S SIGNS: no striae    POCT Blood Glucose.: 158 mg/dL (11-26-23 @ 11:41)  POCT Blood Glucose.: 203 mg/dL (11-26-23 @ 08:52)  POCT Blood Glucose.: 241 mg/dL (11-25-23 @ 21:52)  POCT Blood Glucose.: 206 mg/dL (11-25-23 @ 16:57)  POCT Blood Glucose.: 158 mg/dL (11-25-23 @ 11:02)  POCT Blood Glucose.: 238 mg/dL (11-25-23 @ 07:36)  POCT Blood Glucose.: 177 mg/dL (11-25-23 @ 03:45)  POCT Blood Glucose.: 331 mg/dL (11-24-23 @ 18:02)  POCT Blood Glucose.: 412 mg/dL (11-24-23 @ 11:11)  POCT Blood Glucose.: 415 mg/dL (11-24-23 @ 11:10)  POCT Blood Glucose.: 355 mg/dL (11-24-23 @ 08:28)  POCT Blood Glucose.: 352 mg/dL (11-24-23 @ 08:18)                            11.2   9.04  )-----------( 174      ( 25 Nov 2023 06:26 )             34.0       11-26    135  |  91<L>  |  70<H>  ----------------------------<  236<H>  4.3   |  27  |  8.52<H>    eGFR: 6<L>    Ca    8.1<L>      11-26  Mg     2.4     11-23  Phos  5.3     11-23    TPro  6.6  /  Alb  3.4  /  TBili  0.3  /  DBili  x   /  AST  17  /  ALT  13  /  AlkPhos  87  11-23      Thyroid Function Tests:              Radiology:              Dutasteride Pregnancy And Lactation Text: This medication is absolutely contraindicated in women, especially during pregnancy and breast feeding. Feminization of male fetuses is possible if taking while pregnant.

## 2023-11-26 NOTE — PROGRESS NOTE ADULT - SUBJECTIVE AND OBJECTIVE BOX
Date of Service: 11-26-23 @ 09:28           CARDIOLOGY     PROGRESS  NOTE   ________________________________________________    CHIEF COMPLAINT:Patient is a 63y old  Male who presents with a chief complaint of sob (25 Nov 2023 11:07)  no complain  	  REVIEW OF SYSTEMS:  CONSTITUTIONAL: No fever, weight loss, or fatigue  EYES: No eye pain, visual disturbances, or discharge  ENT:  No difficulty hearing, tinnitus, vertigo; No sinus or throat pain  NECK: No pain or stiffness  RESPIRATORY: No cough, wheezing, chills or hemoptysis; No Shortness of Breath  CARDIOVASCULAR: No chest pain, palpitations, passing out, dizziness, or leg swelling  GASTROINTESTINAL: No abdominal or epigastric pain. No nausea, vomiting, or hematemesis; No diarrhea or constipation. No melena or hematochezia.  GENITOURINARY: No dysuria, frequency, hematuria, or incontinence  NEUROLOGICAL: No headaches, memory loss, loss of strength, numbness, or tremors  SKIN: No itching, burning, rashes, or lesions   LYMPH Nodes: No enlarged glands  ENDOCRINE: No heat or cold intolerance; No hair loss  MUSCULOSKELETAL: No joint pain or swelling; No muscle, back, or extremity pain  PSYCHIATRIC: No depression, anxiety, mood swings, or difficulty sleeping  HEME/LYMPH: No easy bruising, or bleeding gums  ALLERGY AND IMMUNOLOGIC: No hives or eczema	    [ x] All others negative	  [ ] Unable to obtain    PHYSICAL EXAM:  T(C): 36.6 (11-26-23 @ 05:11), Max: 37.3 (11-25-23 @ 19:37)  HR: 77 (11-26-23 @ 05:11) (77 - 85)  BP: 133/68 (11-26-23 @ 05:11) (133/68 - 153/76)  RR: 18 (11-26-23 @ 05:11) (18 - 18)  SpO2: 94% (11-26-23 @ 05:11) (91% - 94%)  Wt(kg): --  I&O's Summary    25 Nov 2023 07:01  -  26 Nov 2023 07:00  --------------------------------------------------------  IN: 330 mL / OUT: 50 mL / NET: 280 mL        Appearance: Normal	  HEENT:   Normal oral mucosa, PERRL, EOMI	  Lymphatic: No lymphadenopathy  Cardiovascular: Normal S1 S2, No JVD, + murmurs, No edema  Respiratory: rhonchi  Psychiatry: A & O x 3, Mood & affect appropriate  Gastrointestinal:  Soft, Non-tender, + BS	  Skin: No rashes, No ecchymoses, No cyanosis	  Extremities: Normal range of motion, No clubbing, cyanosis or edema  Vascular: Peripheral pulses palpable 2+ bilaterally    MEDICATIONS  (STANDING):  albuterol    90 MICROgram(s) HFA Inhaler 2 Puff(s) Inhalation every 8 hours  albuterol/ipratropium for Nebulization 3 milliLiter(s) Nebulizer every 6 hours  allopurinol 100 milliGRAM(s) Oral daily  aspirin  chewable 81 milliGRAM(s) Oral daily  atorvastatin 80 milliGRAM(s) Oral at bedtime  benzonatate 100 milliGRAM(s) Oral three times a day  carvedilol 12.5 milliGRAM(s) Oral every 12 hours  chlorhexidine 4% Liquid 1 Application(s) Topical <User Schedule>  clopidogrel Tablet 75 milliGRAM(s) Oral daily  dextrose 5%. 1000 milliLiter(s) (100 mL/Hr) IV Continuous <Continuous>  dextrose 5%. 1000 milliLiter(s) (50 mL/Hr) IV Continuous <Continuous>  dextrose 50% Injectable 25 Gram(s) IV Push once  dextrose 50% Injectable 12.5 Gram(s) IV Push once  dextrose 50% Injectable 25 Gram(s) IV Push once  gabapentin 100 milliGRAM(s) Oral two times a day  glucagon  Injectable 1 milliGRAM(s) IntraMuscular once  guaiFENesin  milliGRAM(s) Oral every 12 hours  heparin   Injectable 5000 Unit(s) SubCutaneous every 12 hours  influenza   Vaccine 0.5 milliLiter(s) IntraMuscular once  insulin glargine Injectable (LANTUS) 15 Unit(s) SubCutaneous at bedtime  insulin lispro (ADMELOG) corrective regimen sliding scale   SubCutaneous three times a day before meals  insulin lispro (ADMELOG) corrective regimen sliding scale   SubCutaneous at bedtime  insulin lispro Injectable (ADMELOG) 7 Unit(s) SubCutaneous three times a day before meals  senna 2 Tablet(s) Oral at bedtime  sertraline 25 milliGRAM(s) Oral daily  sevelamer carbonate 800 milliGRAM(s) Oral three times a day  valsartan 40 milliGRAM(s) Oral two times a day      TELEMETRY: 	    ECG:  	  RADIOLOGY:  OTHER: 	  	  LABS:	 	    CARDIAC MARKERS:                                11.2   9.04  )-----------( 174      ( 25 Nov 2023 06:26 )             34.0     11-25    138  |  92<L>  |  44<H>  ----------------------------<  187<H>  3.8   |  23  |  6.31<H>    Ca    8.8      25 Nov 2023 06:26      proBNP:   Lipid Profile:   HgA1c:   TSH:         Assessment and plan  ---------------------------  62yo M, hx of ESRD on HD MWF, CAD s/p stents, CHF, DM, Brought in by EMS for shortness of breath.  Patient reported not feeling well for the past 2 to 3 days with productive cough.  Per EMS febrile, Tmax 103.  On scene, patient was hypoxic, satting around 88 to 92% on room air.  This improved with fall liters nasal cannula.  Patient also has positive vomiting, however denies chest pain or shortness of breath at the moment.  per EMS, family states this is similar to his previous presentation for sepsis or pneumonia. History limited as patient is lethargic of note, full run of dialysis is on Monday.  pt with hx of ASHD ESRD on HD CHF, DM with increasing sob, pt had a recent  visit in my office ,now with increasing sob.  rvp is pos RSV  ID has called, consult noted  ESRD on HD  continue all cardiac meds, plavix with sig cad  dvt prophylaxis  will adjust bp meds  oob to chair  dvt prophylaxis  o2  level, on hypoxemic, continuer current management  inhalers  bp is better controlled

## 2023-11-26 NOTE — PROGRESS NOTE ADULT - SUBJECTIVE AND OBJECTIVE BOX
date of service: 11-26-23 @ 17:11  afberile  REVIEW OF SYSTEMS:  CONSTITUTIONAL: No fever,  no  weight loss  ENT:  No  tinnitus,   no   vertigo  NECK: No pain or stiffness  RESPIRATORY: No cough, wheezing, chills or hemoptysis;    No Shortness of Breath  CARDIOVASCULAR: No chest pain, palpitations, dizziness  GASTROINTESTINAL: No abdominal or epigastric pain. No nausea, vomiting, or hematemesis; No diarrhea  No melena or hematochezia.  GENITOURINARY: No dysuria, frequency, hematuria, or incontinence  NEUROLOGICAL: No headaches  SKIN: No itching,  no   rash  LYMPH Nodes: No enlarged glands  ENDOCRINE: No heat or cold intolerance  MUSCULOSKELETAL: No joint pain or swelling  PSYCHIATRIC: No depression, anxiety  HEME/LYMPH: No easy bruising, or bleeding gums  ALLERGY AND IMMUNOLOGIC: No hives or eczema	    MEDICATIONS  (STANDING):  albuterol    90 MICROgram(s) HFA Inhaler 2 Puff(s) Inhalation every 8 hours  albuterol/ipratropium for Nebulization 3 milliLiter(s) Nebulizer every 6 hours  allopurinol 100 milliGRAM(s) Oral daily  aspirin  chewable 81 milliGRAM(s) Oral daily  atorvastatin 80 milliGRAM(s) Oral at bedtime  benzonatate 100 milliGRAM(s) Oral three times a day  carvedilol 12.5 milliGRAM(s) Oral every 12 hours  chlorhexidine 4% Liquid 1 Application(s) Topical <User Schedule>  clopidogrel Tablet 75 milliGRAM(s) Oral daily  dextrose 5%. 1000 milliLiter(s) (50 mL/Hr) IV Continuous <Continuous>  dextrose 5%. 1000 milliLiter(s) (100 mL/Hr) IV Continuous <Continuous>  dextrose 50% Injectable 25 Gram(s) IV Push once  dextrose 50% Injectable 12.5 Gram(s) IV Push once  dextrose 50% Injectable 25 Gram(s) IV Push once  gabapentin 100 milliGRAM(s) Oral two times a day  glucagon  Injectable 1 milliGRAM(s) IntraMuscular once  guaiFENesin  milliGRAM(s) Oral every 12 hours  heparin   Injectable 5000 Unit(s) SubCutaneous every 12 hours  influenza   Vaccine 0.5 milliLiter(s) IntraMuscular once  insulin glargine Injectable (LANTUS) 15 Unit(s) SubCutaneous at bedtime  insulin lispro (ADMELOG) corrective regimen sliding scale   SubCutaneous three times a day before meals  insulin lispro (ADMELOG) corrective regimen sliding scale   SubCutaneous at bedtime  insulin lispro Injectable (ADMELOG) 7 Unit(s) SubCutaneous three times a day before meals  senna 2 Tablet(s) Oral at bedtime  sertraline 25 milliGRAM(s) Oral daily  sevelamer carbonate 800 milliGRAM(s) Oral three times a day  valsartan 40 milliGRAM(s) Oral two times a day    MEDICATIONS  (PRN):  dextrose Oral Gel 15 Gram(s) Oral once PRN Blood Glucose LESS THAN 70 milliGRAM(s)/deciliter      Vital Signs Last 24 Hrs  T(C): 36.7 (26 Nov 2023 11:55), Max: 37.3 (25 Nov 2023 19:37)  T(F): 98 (26 Nov 2023 11:55), Max: 99.1 (25 Nov 2023 19:37)  HR: 71 (26 Nov 2023 11:55) (71 - 85)  BP: 148/79 (26 Nov 2023 11:55) (133/68 - 153/76)  BP(mean): --  RR: 18 (26 Nov 2023 11:55) (18 - 18)  SpO2: 96% (26 Nov 2023 11:55) (91% - 96%)    Parameters below as of 26 Nov 2023 11:55  Patient On (Oxygen Delivery Method): room air      CAPILLARY BLOOD GLUCOSE      POCT Blood Glucose.: 184 mg/dL (26 Nov 2023 17:01)  POCT Blood Glucose.: 158 mg/dL (26 Nov 2023 11:41)  POCT Blood Glucose.: 203 mg/dL (26 Nov 2023 08:52)  POCT Blood Glucose.: 241 mg/dL (25 Nov 2023 21:52)    I&O's Summary    25 Nov 2023 07:01  -  26 Nov 2023 07:00  --------------------------------------------------------  IN: 330 mL / OUT: 50 mL / NET: 280 mL          Appearance: Normal	  HEENT:   Normal oral mucosa, PERRL, EOMI	  Lymphatic: No lymphadenopathy  Cardiovascular: Normal S1 S2, No JVD  Respiratory: Lungs clear to auscultation	  Gastrointestinal:  Soft, Non-tender, + BS	  Skin: No rash, No ecchymoses	  Extremities:     LABS:                        11.2   9.04  )-----------( 174      ( 25 Nov 2023 06:26 )             34.0     11-26    135  |  91<L>  |  70<H>  ----------------------------<  236<H>  4.3   |  27  |  8.52<H>    Ca    8.1<L>      26 Nov 2023 09:59            Urinalysis Basic - ( 26 Nov 2023 09:59 )    Color: x / Appearance: x / SG: x / pH: x  Gluc: 236 mg/dL / Ketone: x  / Bili: x / Urobili: x   Blood: x / Protein: x / Nitrite: x   Leuk Esterase: x / RBC: x / WBC x   Sq Epi: x / Non Sq Epi: x / Bacteria: x                      Consultant(s) Notes Reviewed:      Care Discussed with Consultants/Other Providers:

## 2023-11-26 NOTE — PROGRESS NOTE ADULT - ASSESSMENT
T2DM   - Most recent Hemoglobin A1C 9.5  - Home DM med: toujeo 20 units QHS, humalog 10 unots  - Current FS ranges from 300-400  - Began  insulin Glargine 15 units QHS  -   insulin Lispro 7 units TID with meals,  BS moderately high as noted above  Please increase glargine to 18 units, closer to pre-adm dose    Discharge planning:   - Home DM medications:  toujeo 20 units QHS, humalog 10 unots  - Discharge DM medications:  toujeo and humalog, dose TBD   - Patient will follow up with Dr. Hernandez  at   [facility]:  [address] [place] [address]. [address]. [place] [address]  Tel: [phone]  - Patient will need opthalmology and podiatry follow up as outpatient

## 2023-11-26 NOTE — PROGRESS NOTE ADULT - ASSESSMENT
on room air  afebrile  s/p HD Friday with 2.5L net fluid loss  denies complaints  Next HD Monday    albuterol    90 MICROgram(s) HFA Inhaler 2 Puff(s) Inhalation every 8 hours  albuterol/ipratropium for Nebulization 3 milliLiter(s) Nebulizer every 6 hours  allopurinol 100 milliGRAM(s) Oral daily  aspirin  chewable 81 milliGRAM(s) Oral daily  atorvastatin 80 milliGRAM(s) Oral at bedtime  benzonatate 100 milliGRAM(s) Oral three times a day  carvedilol 12.5 milliGRAM(s) Oral every 12 hours  chlorhexidine 4% Liquid 1 Application(s) Topical <User Schedule>  clopidogrel Tablet 75 milliGRAM(s) Oral daily  dextrose 5%. 1000 milliLiter(s) IV Continuous <Continuous>  dextrose 5%. 1000 milliLiter(s) IV Continuous <Continuous>  dextrose 50% Injectable 25 Gram(s) IV Push once  dextrose 50% Injectable 12.5 Gram(s) IV Push once  dextrose 50% Injectable 25 Gram(s) IV Push once  dextrose Oral Gel 15 Gram(s) Oral once PRN  gabapentin 100 milliGRAM(s) Oral two times a day  glucagon  Injectable 1 milliGRAM(s) IntraMuscular once  guaiFENesin  milliGRAM(s) Oral every 12 hours  heparin   Injectable 5000 Unit(s) SubCutaneous every 12 hours  influenza   Vaccine 0.5 milliLiter(s) IntraMuscular once  insulin glargine Injectable (LANTUS) 15 Unit(s) SubCutaneous at bedtime  insulin lispro (ADMELOG) corrective regimen sliding scale   SubCutaneous at bedtime  insulin lispro (ADMELOG) corrective regimen sliding scale   SubCutaneous three times a day before meals  insulin lispro Injectable (ADMELOG) 7 Unit(s) SubCutaneous three times a day before meals  senna 2 Tablet(s) Oral at bedtime  sertraline 25 milliGRAM(s) Oral daily  sevelamer carbonate 800 milliGRAM(s) Oral three times a day  valsartan 40 milliGRAM(s) Oral two times a day      VITAL:  T(C): , Max: 37.3 (11-25-23 @ 19:37)  T(F): , Max: 99.1 (11-25-23 @ 19:37)  HR: 71 (11-26-23 @ 11:55)  BP: 148/79 (11-26-23 @ 11:55)  BP(mean): --  RR: 18 (11-26-23 @ 11:55)  SpO2: 96% (11-26-23 @ 11:55)  Wt(kg): --    11-25-23 @ 07:01  -  11-26-23 @ 07:00  --------------------------------------------------------  IN: 330 mL / OUT: 50 mL / NET: 280 mL        PHYSICAL EXAM:  Constitutional: NAD  HEENT: EOMI  Neck:  No JVD, supple   Respiratory: CTA B/L  Cardiovascular: S1 and S2, RRR  Gastrointestinal: + BS, soft, NT, ND  Extremities: No peripheral edema, + peripheral pulses  Neurological: A/O x 3, CN2-12 intact  Psychiatric: Normal mood, normal affect  : No Jay  Skin: No rashes, C/D/I  Access: [facility]    LABS:                          11.2   9.04  )-----------( 174      ( 25 Nov 2023 06:26 )             34.0     Na(135)/K(4.3)/Cl(91)/HCO3(27)/BUN(70)/Cr(8.52)Glu(236)/Ca(8.1)/Mg(--)/PO4(--)    11-26 @ 09:59  Na(138)/K(3.8)/Cl(92)/HCO3(23)/BUN(44)/Cr(6.31)Glu(187)/Ca(8.8)/Mg(--)/PO4(--)    11-25 @ 06:26  Na(135)/K(4.5)/Cl(97)/HCO3(21)/BUN(67)/Cr(7.94)Glu(342)/Ca(7.9)/Mg(--)/PO4(--)    11-23 @ 19:48  Na(136)/K(6.1)/Cl(97)/HCO3(19)/BUN(65)/Cr(7.51)Glu(352)/Ca(8.0)/Mg(2.4)/PO4(5.3)    11-23 @ 18:24    Urinalysis Basic - ( 26 Nov 2023 09:59 )    Color: x / Appearance: x / SG: x / pH: x  Gluc: 236 mg/dL / Ketone: x  / Bili: x / Urobili: x   Blood: x / Protein: x / Nitrite: x   Leuk Esterase: x / RBC: x / WBC x   Sq Epi: x / Non Sq Epi: x / Bacteria: x            ASSESSMENT/PLAN  Assessment:  62yo M, hx of ESRD on HD MWF, CAD s/p stents, CHF, DM, Brought in by EMS for shortness of breath and found to have + RSV    1 Renal - s/p HD Friday wit 2.5 L net fluid loss. Next HD Monday  2 Lytes- controlled  3 CV- BP accepatble at present  4 Anemia - hgb improving (11/25/23)  5 ID- s/p IV abx     Valentino Meza NP-BC  [facility]  [phone]

## 2023-11-27 DIAGNOSIS — E78.5 HYPERLIPIDEMIA, UNSPECIFIED: ICD-10-CM

## 2023-11-27 DIAGNOSIS — I10 ESSENTIAL (PRIMARY) HYPERTENSION: ICD-10-CM

## 2023-11-27 LAB
ANION GAP SERPL CALC-SCNC: 19 MMOL/L — HIGH (ref 5–17)
ANION GAP SERPL CALC-SCNC: 19 MMOL/L — HIGH (ref 5–17)
BUN SERPL-MCNC: 82 MG/DL — HIGH (ref 7–23)
BUN SERPL-MCNC: 82 MG/DL — HIGH (ref 7–23)
CALCIUM SERPL-MCNC: 7.2 MG/DL — LOW (ref 8.4–10.5)
CALCIUM SERPL-MCNC: 7.2 MG/DL — LOW (ref 8.4–10.5)
CHLORIDE SERPL-SCNC: 92 MMOL/L — LOW (ref 96–108)
CHLORIDE SERPL-SCNC: 92 MMOL/L — LOW (ref 96–108)
CO2 SERPL-SCNC: 23 MMOL/L — SIGNIFICANT CHANGE UP (ref 22–31)
CO2 SERPL-SCNC: 23 MMOL/L — SIGNIFICANT CHANGE UP (ref 22–31)
CREAT SERPL-MCNC: 9.35 MG/DL — HIGH (ref 0.5–1.3)
CREAT SERPL-MCNC: 9.35 MG/DL — HIGH (ref 0.5–1.3)
EGFR: 6 ML/MIN/1.73M2 — LOW
EGFR: 6 ML/MIN/1.73M2 — LOW
GLUCOSE BLDC GLUCOMTR-MCNC: 237 MG/DL — HIGH (ref 70–99)
GLUCOSE BLDC GLUCOMTR-MCNC: 237 MG/DL — HIGH (ref 70–99)
GLUCOSE BLDC GLUCOMTR-MCNC: 240 MG/DL — HIGH (ref 70–99)
GLUCOSE BLDC GLUCOMTR-MCNC: 240 MG/DL — HIGH (ref 70–99)
GLUCOSE BLDC GLUCOMTR-MCNC: 256 MG/DL — HIGH (ref 70–99)
GLUCOSE BLDC GLUCOMTR-MCNC: 256 MG/DL — HIGH (ref 70–99)
GLUCOSE BLDC GLUCOMTR-MCNC: 311 MG/DL — HIGH (ref 70–99)
GLUCOSE BLDC GLUCOMTR-MCNC: 311 MG/DL — HIGH (ref 70–99)
GLUCOSE BLDC GLUCOMTR-MCNC: 338 MG/DL — HIGH (ref 70–99)
GLUCOSE BLDC GLUCOMTR-MCNC: 338 MG/DL — HIGH (ref 70–99)
GLUCOSE SERPL-MCNC: 318 MG/DL — HIGH (ref 70–99)
GLUCOSE SERPL-MCNC: 318 MG/DL — HIGH (ref 70–99)
HCT VFR BLD CALC: 32.1 % — LOW (ref 39–50)
HCT VFR BLD CALC: 32.1 % — LOW (ref 39–50)
HGB BLD-MCNC: 10.8 G/DL — LOW (ref 13–17)
HGB BLD-MCNC: 10.8 G/DL — LOW (ref 13–17)
MAGNESIUM SERPL-MCNC: 2.5 MG/DL — SIGNIFICANT CHANGE UP (ref 1.6–2.6)
MAGNESIUM SERPL-MCNC: 2.5 MG/DL — SIGNIFICANT CHANGE UP (ref 1.6–2.6)
MCHC RBC-ENTMCNC: 32.2 PG — SIGNIFICANT CHANGE UP (ref 27–34)
MCHC RBC-ENTMCNC: 32.2 PG — SIGNIFICANT CHANGE UP (ref 27–34)
MCHC RBC-ENTMCNC: 33.6 GM/DL — SIGNIFICANT CHANGE UP (ref 32–36)
MCHC RBC-ENTMCNC: 33.6 GM/DL — SIGNIFICANT CHANGE UP (ref 32–36)
MCV RBC AUTO: 95.8 FL — SIGNIFICANT CHANGE UP (ref 80–100)
MCV RBC AUTO: 95.8 FL — SIGNIFICANT CHANGE UP (ref 80–100)
NRBC # BLD: 0 /100 WBCS — SIGNIFICANT CHANGE UP (ref 0–0)
NRBC # BLD: 0 /100 WBCS — SIGNIFICANT CHANGE UP (ref 0–0)
PHOSPHATE SERPL-MCNC: 7.9 MG/DL — HIGH (ref 2.5–4.5)
PHOSPHATE SERPL-MCNC: 7.9 MG/DL — HIGH (ref 2.5–4.5)
PLATELET # BLD AUTO: 218 K/UL — SIGNIFICANT CHANGE UP (ref 150–400)
PLATELET # BLD AUTO: 218 K/UL — SIGNIFICANT CHANGE UP (ref 150–400)
POTASSIUM SERPL-MCNC: 3.8 MMOL/L — SIGNIFICANT CHANGE UP (ref 3.5–5.3)
POTASSIUM SERPL-MCNC: 3.8 MMOL/L — SIGNIFICANT CHANGE UP (ref 3.5–5.3)
POTASSIUM SERPL-SCNC: 3.8 MMOL/L — SIGNIFICANT CHANGE UP (ref 3.5–5.3)
POTASSIUM SERPL-SCNC: 3.8 MMOL/L — SIGNIFICANT CHANGE UP (ref 3.5–5.3)
RBC # BLD: 3.35 M/UL — LOW (ref 4.2–5.8)
RBC # BLD: 3.35 M/UL — LOW (ref 4.2–5.8)
RBC # FLD: 12.7 % — SIGNIFICANT CHANGE UP (ref 10.3–14.5)
RBC # FLD: 12.7 % — SIGNIFICANT CHANGE UP (ref 10.3–14.5)
SODIUM SERPL-SCNC: 134 MMOL/L — LOW (ref 135–145)
SODIUM SERPL-SCNC: 134 MMOL/L — LOW (ref 135–145)
WBC # BLD: 8.62 K/UL — SIGNIFICANT CHANGE UP (ref 3.8–10.5)
WBC # BLD: 8.62 K/UL — SIGNIFICANT CHANGE UP (ref 3.8–10.5)
WBC # FLD AUTO: 8.62 K/UL — SIGNIFICANT CHANGE UP (ref 3.8–10.5)
WBC # FLD AUTO: 8.62 K/UL — SIGNIFICANT CHANGE UP (ref 3.8–10.5)

## 2023-11-27 PROCEDURE — 99232 SBSQ HOSP IP/OBS MODERATE 35: CPT

## 2023-11-27 PROCEDURE — 93010 ELECTROCARDIOGRAM REPORT: CPT

## 2023-11-27 RX ORDER — INSULIN LISPRO 100/ML
7 VIAL (ML) SUBCUTANEOUS
Refills: 0 | Status: DISCONTINUED | OUTPATIENT
Start: 2023-11-27 | End: 2023-11-28

## 2023-11-27 RX ORDER — INSULIN LISPRO 100/ML
9 VIAL (ML) SUBCUTANEOUS
Refills: 0 | Status: DISCONTINUED | OUTPATIENT
Start: 2023-11-28 | End: 2023-11-28

## 2023-11-27 RX ORDER — ERYTHROPOIETIN 10000 [IU]/ML
2000 INJECTION, SOLUTION INTRAVENOUS; SUBCUTANEOUS ONCE
Refills: 0 | Status: COMPLETED | OUTPATIENT
Start: 2023-11-27 | End: 2023-11-27

## 2023-11-27 RX ORDER — INSULIN GLARGINE 100 [IU]/ML
18 INJECTION, SOLUTION SUBCUTANEOUS AT BEDTIME
Refills: 0 | Status: DISCONTINUED | OUTPATIENT
Start: 2023-11-27 | End: 2023-11-28

## 2023-11-27 RX ADMIN — HEPARIN SODIUM 5000 UNIT(S): 5000 INJECTION INTRAVENOUS; SUBCUTANEOUS at 19:11

## 2023-11-27 RX ADMIN — Medication 7 UNIT(S): at 18:00

## 2023-11-27 RX ADMIN — Medication 100 MILLIGRAM(S): at 04:54

## 2023-11-27 RX ADMIN — GABAPENTIN 100 MILLIGRAM(S): 400 CAPSULE ORAL at 04:53

## 2023-11-27 RX ADMIN — CLOPIDOGREL BISULFATE 75 MILLIGRAM(S): 75 TABLET, FILM COATED ORAL at 11:30

## 2023-11-27 RX ADMIN — Medication 100 MILLIGRAM(S): at 11:31

## 2023-11-27 RX ADMIN — Medication 81 MILLIGRAM(S): at 11:30

## 2023-11-27 RX ADMIN — Medication 100 MILLIGRAM(S): at 11:30

## 2023-11-27 RX ADMIN — SEVELAMER CARBONATE 800 MILLIGRAM(S): 2400 POWDER, FOR SUSPENSION ORAL at 04:54

## 2023-11-27 RX ADMIN — HEPARIN SODIUM 5000 UNIT(S): 5000 INJECTION INTRAVENOUS; SUBCUTANEOUS at 04:58

## 2023-11-27 RX ADMIN — Medication 5: at 09:49

## 2023-11-27 RX ADMIN — Medication 7 UNIT(S): at 09:54

## 2023-11-27 RX ADMIN — Medication 100 MILLIGRAM(S): at 21:24

## 2023-11-27 RX ADMIN — VALSARTAN 40 MILLIGRAM(S): 80 TABLET ORAL at 19:11

## 2023-11-27 RX ADMIN — CARVEDILOL PHOSPHATE 12.5 MILLIGRAM(S): 80 CAPSULE, EXTENDED RELEASE ORAL at 19:11

## 2023-11-27 RX ADMIN — GABAPENTIN 100 MILLIGRAM(S): 400 CAPSULE ORAL at 19:11

## 2023-11-27 RX ADMIN — Medication 600 MILLIGRAM(S): at 19:11

## 2023-11-27 RX ADMIN — Medication 2: at 11:29

## 2023-11-27 RX ADMIN — SEVELAMER CARBONATE 800 MILLIGRAM(S): 2400 POWDER, FOR SUSPENSION ORAL at 21:25

## 2023-11-27 RX ADMIN — Medication 2: at 18:00

## 2023-11-27 RX ADMIN — VALSARTAN 40 MILLIGRAM(S): 80 TABLET ORAL at 04:56

## 2023-11-27 RX ADMIN — Medication 7 UNIT(S): at 11:29

## 2023-11-27 RX ADMIN — ATORVASTATIN CALCIUM 80 MILLIGRAM(S): 80 TABLET, FILM COATED ORAL at 21:24

## 2023-11-27 RX ADMIN — Medication 600 MILLIGRAM(S): at 04:57

## 2023-11-27 RX ADMIN — SEVELAMER CARBONATE 800 MILLIGRAM(S): 2400 POWDER, FOR SUSPENSION ORAL at 11:31

## 2023-11-27 RX ADMIN — SERTRALINE 25 MILLIGRAM(S): 25 TABLET, FILM COATED ORAL at 11:30

## 2023-11-27 RX ADMIN — CARVEDILOL PHOSPHATE 12.5 MILLIGRAM(S): 80 CAPSULE, EXTENDED RELEASE ORAL at 04:55

## 2023-11-27 RX ADMIN — Medication 2: at 21:23

## 2023-11-27 RX ADMIN — INSULIN GLARGINE 18 UNIT(S): 100 INJECTION, SOLUTION SUBCUTANEOUS at 21:26

## 2023-11-27 RX ADMIN — ERYTHROPOIETIN 2000 UNIT(S): 10000 INJECTION, SOLUTION INTRAVENOUS; SUBCUTANEOUS at 14:58

## 2023-11-27 NOTE — PROGRESS NOTE ADULT - NUTRITIONAL ASSESSMENT
Diet, Renal Restrictions:   For patients receiving Renal Replacement - No Protein Restr, No Conc K, No Conc Phos, Low Sodium  Consistent Carbohydrate {Evening Snack} (CSTCHOSN) (11-24-23 @ 07:26) [Active]

## 2023-11-27 NOTE — PROGRESS NOTE ADULT - SUBJECTIVE AND OBJECTIVE BOX
date of service: 11-27-23 @ 10:41  afberile  REVIEW OF SYSTEMS:  CONSTITUTIONAL: No fever,  no  weight loss  ENT:  No  tinnitus,   no   vertigo  NECK: No pain or stiffness  RESPIRATORY: No cough, wheezing, chills or hemoptysis;    No Shortness of Breath  CARDIOVASCULAR: No chest pain, palpitations, dizziness  GASTROINTESTINAL: No abdominal or epigastric pain. No nausea, vomiting, or hematemesis; No diarrhea  No melena or hematochezia.  GENITOURINARY: No dysuria, frequency, hematuria, or incontinence  NEUROLOGICAL: No headaches  SKIN: No itching,  no   rash  LYMPH Nodes: No enlarged glands  ENDOCRINE: No heat or cold intolerance  MUSCULOSKELETAL: No joint pain or swelling  PSYCHIATRIC: No depression, anxiety  HEME/LYMPH: No easy bruising, or bleeding gums  ALLERGY AND IMMUNOLOGIC: No hives or eczema	    MEDICATIONS  (STANDING):  albuterol    90 MICROgram(s) HFA Inhaler 2 Puff(s) Inhalation every 8 hours  albuterol/ipratropium for Nebulization 3 milliLiter(s) Nebulizer every 6 hours  allopurinol 100 milliGRAM(s) Oral daily  aspirin  chewable 81 milliGRAM(s) Oral daily  atorvastatin 80 milliGRAM(s) Oral at bedtime  benzonatate 100 milliGRAM(s) Oral three times a day  carvedilol 12.5 milliGRAM(s) Oral every 12 hours  chlorhexidine 4% Liquid 1 Application(s) Topical <User Schedule>  clopidogrel Tablet 75 milliGRAM(s) Oral daily  dextrose 5%. 1000 milliLiter(s) (50 mL/Hr) IV Continuous <Continuous>  dextrose 5%. 1000 milliLiter(s) (100 mL/Hr) IV Continuous <Continuous>  dextrose 50% Injectable 25 Gram(s) IV Push once  dextrose 50% Injectable 12.5 Gram(s) IV Push once  dextrose 50% Injectable 25 Gram(s) IV Push once  epoetin wayne (EPOGEN) Injectable 2000 Unit(s) IV Push once  gabapentin 100 milliGRAM(s) Oral two times a day  glucagon  Injectable 1 milliGRAM(s) IntraMuscular once  guaiFENesin  milliGRAM(s) Oral every 12 hours  heparin   Injectable 5000 Unit(s) SubCutaneous every 12 hours  influenza   Vaccine 0.5 milliLiter(s) IntraMuscular once  insulin glargine Injectable (LANTUS) 15 Unit(s) SubCutaneous at bedtime  insulin lispro (ADMELOG) corrective regimen sliding scale   SubCutaneous three times a day before meals  insulin lispro (ADMELOG) corrective regimen sliding scale   SubCutaneous at bedtime  insulin lispro Injectable (ADMELOG) 7 Unit(s) SubCutaneous three times a day before meals  senna 2 Tablet(s) Oral at bedtime  sertraline 25 milliGRAM(s) Oral daily  sevelamer carbonate 800 milliGRAM(s) Oral three times a day  valsartan 40 milliGRAM(s) Oral two times a day    MEDICATIONS  (PRN):  dextrose Oral Gel 15 Gram(s) Oral once PRN Blood Glucose LESS THAN 70 milliGRAM(s)/deciliter      Vital Signs Last 24 Hrs  T(C): 36.8 (27 Nov 2023 04:32), Max: 36.9 (26 Nov 2023 20:06)  T(F): 98.3 (27 Nov 2023 04:32), Max: 98.4 (26 Nov 2023 20:06)  HR: 78 (27 Nov 2023 04:32) (71 - 78)  BP: 165/81 (27 Nov 2023 04:32) (148/79 - 165/81)  BP(mean): --  RR: 16 (27 Nov 2023 04:32) (16 - 18)  SpO2: 98% (27 Nov 2023 04:32) (94% - 98%)    Parameters below as of 27 Nov 2023 04:32  Patient On (Oxygen Delivery Method): room air      CAPILLARY BLOOD GLUCOSE      POCT Blood Glucose.: 338 mg/dL (27 Nov 2023 08:17)  POCT Blood Glucose.: 167 mg/dL (26 Nov 2023 21:18)  POCT Blood Glucose.: 184 mg/dL (26 Nov 2023 17:01)  POCT Blood Glucose.: 158 mg/dL (26 Nov 2023 11:41)    I&O's Summary    26 Nov 2023 07:01  -  27 Nov 2023 07:00  --------------------------------------------------------  IN: 120 mL / OUT: 0 mL / NET: 120 mL          Appearance: Normal	  HEENT:   Normal oral mucosa, PERRL, EOMI	  Lymphatic: No lymphadenopathy  Cardiovascular: Normal S1 S2, No JVD  Respiratory: Lungs clear to auscultation	  Gastrointestinal:  Soft, Non-tender, + BS	  Skin: No rash, No ecchymoses	  Extremities:     LABS:    11-27    134<L>  |  92<L>  |  82<H>  ----------------------------<  318<H>  3.8   |  23  |  9.35<H>    Ca    7.2<L>      27 Nov 2023 06:24  Phos  7.9     11-27  Mg     2.5     11-27            Urinalysis Basic - ( 27 Nov 2023 06:24 )    Color: x / Appearance: x / SG: x / pH: x  Gluc: 318 mg/dL / Ketone: x  / Bili: x / Urobili: x   Blood: x / Protein: x / Nitrite: x   Leuk Esterase: x / RBC: x / WBC x   Sq Epi: x / Non Sq Epi: x / Bacteria: x                      Consultant(s) Notes Reviewed:      Care Discussed with Consultants/Other Providers:

## 2023-11-27 NOTE — PROGRESS NOTE ADULT - ASSESSMENT
Patient is a 63 M with history of ESRD on HD, CAD s/p stent, CHF, DM here for SOB, found to have RSV infection. Endocrinology consulted for diabetes management. BG Goal 100-180mg/dl   fasting and postprandial hyperglycemia today. Tolerating POs    # T2DM   - Most recent Hemoglobin A1C 9.5  - Home DM med: toujeo 20 units QHS, humalog 10 unots  - Current FS ranges from 300-400  - Current diet:renal/CHO  - Please monitor blood glucose values TID AC & QHS while eating regular meals and Q6H while NPO  - Blood glucose goals pre-meal less than 140 mg/dL and random blood glucose less than 180 mg/dL  - Recommendations:  - Glucose currently elevated  - Increase Glargine 18 units QHS  - Adjust insulin Lispro 9-9-7  units TID with meals, hold if NPO or if eating less than 50% of meals  - Continue with low dose correctional scale TID with meals  - Continue with low dose correctional scale QHS    Discharge planning:   - Home DM medications:  toujeo 20 units QHS, humalog 10 unots  - Discharge DM medications:  toujeo and humalog, dose TBD   - Patient will follow up with Dr. Hernandez  at   [facility]:  [address] [place] [address]. [address]. [place] [address]  Tel: [phone]  - Patient will need opthalmology and podiatry follow up as outpatient     # HTN  - BP goal 130/80  - Manage per primary team     # HLD  - Continue with atovastatin 80 mg QHS  - Goal LDL<70  - Manage as outpatient     Tori Simon NP-C   Contact via [employer] Teams during business hours  To reach covering provider access AMION via sunrise tools  For Urgent matters/after-hours/weekends/holidays please page endocrine fellow on call   For nonurgent matters please email [email or web address]    Please note that this patient may be followed by different provider tomorrow.  Notify endocrine 24 hours prior to discharge for final recommendations

## 2023-11-27 NOTE — PROGRESS NOTE ADULT - SUBJECTIVE AND OBJECTIVE BOX
Date of Service: 11-27-23 @ 07:19           CARDIOLOGY     PROGRESS  NOTE   ________________________________________________    CHIEF COMPLAINT:Patient is a 63y old  Male who presents with a chief complaint of sob (26 Nov 2023 17:11)  doing better, decrease sob  	  REVIEW OF SYSTEMS:  CONSTITUTIONAL: No fever, weight loss, or fatigue  EYES: No eye pain, visual disturbances, or discharge  ENT:  No difficulty hearing, tinnitus, vertigo; No sinus or throat pain  NECK: No pain or stiffness  RESPIRATORY: No cough, wheezing, chills or hemoptysis; decrease  Shortness of Breath  CARDIOVASCULAR: No chest pain, palpitations, passing out, dizziness, or leg swelling  GASTROINTESTINAL: No abdominal or epigastric pain. No nausea, vomiting, or hematemesis; No diarrhea or constipation. No melena or hematochezia.  GENITOURINARY: No dysuria, frequency, hematuria, or incontinence  NEUROLOGICAL: No headaches, memory loss, loss of strength, numbness, or tremors  SKIN: No itching, burning, rashes, or lesions   LYMPH Nodes: No enlarged glands  ENDOCRINE: No heat or cold intolerance; No hair loss  MUSCULOSKELETAL: No joint pain or swelling; No muscle, back, or extremity pain  PSYCHIATRIC: No depression, anxiety, mood swings, or difficulty sleeping  HEME/LYMPH: No easy bruising, or bleeding gums  ALLERGY AND IMMUNOLOGIC: No hives or eczema	    [x ] All others negative	  [ ] Unable to obtain    PHYSICAL EXAM:  T(C): 36.8 (11-27-23 @ 04:32), Max: 36.9 (11-26-23 @ 20:06)  HR: 78 (11-27-23 @ 04:32) (71 - 78)  BP: 165/81 (11-27-23 @ 04:32) (148/79 - 165/81)  RR: 16 (11-27-23 @ 04:32) (16 - 18)  SpO2: 98% (11-27-23 @ 04:32) (94% - 98%)  Wt(kg): --  I&O's Summary    26 Nov 2023 07:01  -  27 Nov 2023 07:00  --------------------------------------------------------  IN: 120 mL / OUT: 0 mL / NET: 120 mL        Appearance: Normal	  HEENT:   Normal oral mucosa, PERRL, EOMI	  Lymphatic: No lymphadenopathy  Cardiovascular: Normal S1 S2, No JVD, + murmurs, No edema  Respiratoryrhonchi  Psychiatry: A & O x 3, Mood & affect appropriate  Gastrointestinal:  Soft, Non-tender, + BS	  Skin: No rashes, No ecchymoses, No cyanosis	  Neurologic: Non-focal  Extremities: Normal range of motion, No clubbing, cyanosis or edema  Vascular: + pvd    MEDICATIONS  (STANDING):  albuterol    90 MICROgram(s) HFA Inhaler 2 Puff(s) Inhalation every 8 hours  albuterol/ipratropium for Nebulization 3 milliLiter(s) Nebulizer every 6 hours  allopurinol 100 milliGRAM(s) Oral daily  aspirin  chewable 81 milliGRAM(s) Oral daily  atorvastatin 80 milliGRAM(s) Oral at bedtime  benzonatate 100 milliGRAM(s) Oral three times a day  carvedilol 12.5 milliGRAM(s) Oral every 12 hours  chlorhexidine 4% Liquid 1 Application(s) Topical <User Schedule>  clopidogrel Tablet 75 milliGRAM(s) Oral daily  dextrose 5%. 1000 milliLiter(s) (50 mL/Hr) IV Continuous <Continuous>  dextrose 5%. 1000 milliLiter(s) (100 mL/Hr) IV Continuous <Continuous>  dextrose 50% Injectable 25 Gram(s) IV Push once  dextrose 50% Injectable 12.5 Gram(s) IV Push once  dextrose 50% Injectable 25 Gram(s) IV Push once  gabapentin 100 milliGRAM(s) Oral two times a day  glucagon  Injectable 1 milliGRAM(s) IntraMuscular once  guaiFENesin  milliGRAM(s) Oral every 12 hours  heparin   Injectable 5000 Unit(s) SubCutaneous every 12 hours  influenza   Vaccine 0.5 milliLiter(s) IntraMuscular once  insulin glargine Injectable (LANTUS) 15 Unit(s) SubCutaneous at bedtime  insulin lispro (ADMELOG) corrective regimen sliding scale   SubCutaneous three times a day before meals  insulin lispro (ADMELOG) corrective regimen sliding scale   SubCutaneous at bedtime  insulin lispro Injectable (ADMELOG) 7 Unit(s) SubCutaneous three times a day before meals  senna 2 Tablet(s) Oral at bedtime  sertraline 25 milliGRAM(s) Oral daily  sevelamer carbonate 800 milliGRAM(s) Oral three times a day  valsartan 40 milliGRAM(s) Oral two times a day      TELEMETRY: 	    ECG:  	  RADIOLOGY:  OTHER: 	  	  LABS:	 	    CARDIAC MARKERS:            11-27    134<L>  |  92<L>  |  82<H>  ----------------------------<  318<H>  3.8   |  23  |  9.35<H>    Ca    7.2<L>      27 Nov 2023 06:24  Phos  7.9     11-27  Mg     2.5     11-27      proBNP:   Lipid Profile:   HgA1c:   TSH:     1 Renal - s/p HD Friday wit 2.5 L net fluid loss. Next HD Monday  2 Lytes- controlled  3 CV- BP accepatble at present  4 Anemia - hgb improving (11/25/23)  5 ID- s/p IV abx       < from: CT Chest No Cont (11.24.23 @ 08:53) >  Bibasilar pleural effusion and peribronchovascular foci of groundglass   opacity and centrilobular nodules involving all 5 lobes which are more   consolidative in the right middle lobe. Clinical findings favors   endobronchial spread of infection over aspiration pneumonitis      #Fever, shortness of breath, cough  #RSV infection  #ESRD on HD  Patient feeling better since admission  No evidence for consolidation on CXR  Continus to require high flow oxygen otherwise afebrile, no leukocytosis    Recommendations  Would continue supportive measures for RSV infection, URI  Would hold off ribavirin given clinical improvement  Less likely to be harboring a bacterial superimposed pneumonia therefore would discontinue ceftriaxone  Obtain procalcitonin   Follow fever curve and WBC count    Assessment and plan  ---------------------------  64yo M, hx of ESRD on HD MWF, CAD s/p stents, CHF, DM, Brought in by EMS for shortness of breath.  Patient reported not feeling well for the past 2 to 3 days with productive cough.  Per EMS febrile, Tmax 103.  On scene, patient was hypoxic, satting around 88 to 92% on room air.  This improved with fall liters nasal cannula.  Patient also has positive vomiting, however denies chest pain or shortness of breath at the moment.  per EMS, family states this is similar to his previous presentation for sepsis or pneumonia. History limited as patient is lethargic of note, full run of dialysis is on Monday.  pt with hx of ASHD ESRD on HD CHF, DM with increasing sob, pt had a recent  visit in my office ,now with increasing sob.  rvp is pos RSV  ID has called, consult noted  ESRD on HD  continue all cardiac meds, plavix with sig cad  dvt prophylaxis  will adjust bp meds  oob to chair  dvt prophylaxis  o2  level, on hypoxemic, continuer current management  inhalers  bp is better controlled  add Duoneb, check cbc, wbc

## 2023-11-27 NOTE — PROGRESS NOTE ADULT - SUBJECTIVE AND OBJECTIVE BOX
Seen earlier today at HD units     Chief Complaint: Diabetes Mellitus follow up    INTERVAL HX: Hyperglycemia today ( BGs 200s-338 ) with fasting . Tolerating POs, eats full meals. Reports that he had crackers at bedtime but did not eat anything before breakfast       Review of Systems:  General: As above  GI: No nausea, vomiting  Endocrine: no  S&Sx of hypoglycemia    Allergies    shellfish (Unknown)  No Known Drug Allergies    Intolerances      MEDICATIONS  (STANDING):  atorvastatin 80 milliGRAM(s) Oral at bedtime  carvedilol 12.5 milliGRAM(s) Oral every 12 hours  dextrose 5%. 1000 milliLiter(s) (100 mL/Hr) IV Continuous <Continuous>  dextrose 5%. 1000 milliLiter(s) (50 mL/Hr) IV Continuous <Continuous>  dextrose 50% Injectable 25 Gram(s) IV Push once  dextrose 50% Injectable 12.5 Gram(s) IV Push once  dextrose 50% Injectable 25 Gram(s) IV Push once  gabapentin 100 milliGRAM(s) Oral two times a day  glucagon  Injectable 1 milliGRAM(s) IntraMuscular once  insulin glargine Injectable (LANTUS) 18 Unit(s) SubCutaneous at bedtime  insulin lispro (ADMELOG) corrective regimen sliding scale   SubCutaneous three times a day before meals  insulin lispro (ADMELOG) corrective regimen sliding scale   SubCutaneous at bedtime  insulin lispro Injectable (ADMELOG) 7 Unit(s) SubCutaneous before dinner  valsartan 40 milliGRAM(s) Oral two times a day      allopurinol   100 milliGRAM(s) Oral (11-27-23 @ 11:30)    atorvastatin   80 milliGRAM(s) Oral (11-26-23 @ 21:21)    insulin glargine Injectable (LANTUS)   15 Unit(s) SubCutaneous (11-26-23 @ 21:48)    insulin lispro (ADMELOG) corrective regimen sliding scale   2 Unit(s) SubCutaneous (11-27-23 @ 18:00)   2 Unit(s) SubCutaneous (11-27-23 @ 11:29)   5 Unit(s) SubCutaneous (11-27-23 @ 09:49)    insulin lispro Injectable (ADMELOG)   7 Unit(s) SubCutaneous (11-27-23 @ 11:29)   7 Unit(s) SubCutaneous (11-27-23 @ 09:54)    insulin lispro Injectable (ADMELOG)   7 Unit(s) SubCutaneous (11-27-23 @ 18:00)        PHYSICAL EXAM:  VITALS: T(C): 36.1 (11-27-23 @ 16:35)  T(F): 97 (11-27-23 @ 16:35), Max: 98.4 (11-26-23 @ 20:06)  HR: 86 (11-27-23 @ 16:35) (72 - 86)  BP: 136/67 (11-27-23 @ 16:35) (124/60 - 165/81)  RR:  (16 - 18)  SpO2:  (94% - 98%)  Wt(kg): --  GENERAL: Male laying in bed, HD in progress, in NAD  Respiratory: Respirations unlabored   Extremities: Warm, no edema  NEURO: Alert , appropriate     LABS:  POCT Blood Glucose.: 237 mg/dL (11-27-23 @ 17:50)  POCT Blood Glucose.: 240 mg/dL (11-27-23 @ 11:13)  POCT Blood Glucose.: 338 mg/dL (11-27-23 @ 08:17)  POCT Blood Glucose.: 167 mg/dL (11-26-23 @ 21:18)  POCT Blood Glucose.: 184 mg/dL (11-26-23 @ 17:01)  POCT Blood Glucose.: 158 mg/dL (11-26-23 @ 11:41)  POCT Blood Glucose.: 203 mg/dL (11-26-23 @ 08:52)  POCT Blood Glucose.: 241 mg/dL (11-25-23 @ 21:52)  POCT Blood Glucose.: 206 mg/dL (11-25-23 @ 16:57)  POCT Blood Glucose.: 158 mg/dL (11-25-23 @ 11:02)  POCT Blood Glucose.: 238 mg/dL (11-25-23 @ 07:36)  POCT Blood Glucose.: 177 mg/dL (11-25-23 @ 03:45)                          10.8   8.62  )-----------( 218      ( 27 Nov 2023 12:16 )             32.1     11-27    134<L>  |  92<L>  |  82<H>  ----------------------------<  318<H>  3.8   |  23  |  9.35<H>    Ca    7.2<L>      27 Nov 2023 06:24  Phos  7.9     11-27  Mg     2.5     11-27      eGFR: 6 mL/min/1.73m2 (27 Nov 2023 06:24)      Thyroid Function Tests:      A1C with Estimated Average Glucose Result: 9.5 % (11-23-23 @ 18:24)    Estimated Average Glucose: 226 mg/dL (11-23-23 @ 18:24)        Diet, Renal Restrictions:   For patients receiving Renal Replacement - No Protein Restr, No Conc K, No Conc Phos, Low Sodium  Consistent Carbohydrate Evening Snack (CSTCHOSN) (11-24-23 @ 07:26) [Active]

## 2023-11-27 NOTE — PROGRESS NOTE ADULT - ASSESSMENT
63    yr      hx CAD   s/p  2 drug eluding stents 11/2022       CHF, DM, gout, ESRD on dialysis      c/c  weakness  of   limbs/  neuro dr mccain/ h/o   gout, right  wrist,  has  functional  quadriplegia/  CT  head, . 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      pt  with  h/o intermittent  right  arm /leg weakness  has been   c/c  for  yrs/  with  unclear  etiology / MRI  head/  c spine in 10/22,  no cord  compression       seen by neuro in  past,   per  wife,  thinks  pt  has  a psychological  component  regarding  his  weakness/  s/p Strep pneumoniae  bacteremia  and pna           now admitted  with fevers/   sob,  from viral  sx          RSV  +/  supportive care.  appears  well.  not hypoxic . seen  by ID            on  iv rocephin/ CT  chest,  pna.  and  per  ID,  ab  not  needed     Gout    CAD,    s/p    pci.        on  asa/ plavix/   lipitor,          ef  45. on 5/.23     CKD, on HD           renal  dr dickerson      c/c  anemia     DM,  follow  fs           on lantus     on  dvt ppx/ gabapentin,  asa.  lipitor,  coreg., valsartan,  lantus,  zolofr. allopurinol  RSV. supportive  care/ PT  eval   awiating   HD today/  oob in chair r  this  am          rad< from: TTE W or WO Ultrasound Enhancing Agent (05.24.23 @ 12:00) >  CONCLUSIONS:     1. The left ventricular systolic function is mildly decreased with an ejection fraction visually estimated at 45 to 50 %. There are regional wall motion abnormalities.   2. Mid and apical anterior septum and apex are abnormal.   3. Normal systolic function. The tricuspid annular plane systolic excursion (TAPSE) is 1.8 cm (normal >=1.7 cm).   4. No prior echocardiogram is available for comparison.    _____________________________________________________________  < end of copied text >

## 2023-11-27 NOTE — PROGRESS NOTE ADULT - ASSESSMENT
63-year-old male with a past medical history significant for ESRD on HD, CAD status post PCI, heart failure, diabetes who was admitted to the hospital due to shortness of breath.    #Fever, shortness of breath, cough  #RSV infection  #ESRD on HD  Patient feeling better since admission  No evidence for consolidation on CXR  Continues to require high flow oxygen otherwise afebrile, no leukocytosis  CT without evidence for consolidation, doubt bacterial superimposed infection given clinical status  Procalcitonin elevated to 41 however questionable significance in setting of ESRD    Recommendations  Would continue supportive measures for RSV infection, URI  No indication for antibiotics or ribavirin  Follow fever curve and WBC count    ID to sign off. Please contact as issues arise.    Con Tavarez MD  Division of Infectious Diseases   63-year-old male with a past medical history significant for ESRD on HD, CAD status post PCI, heart failure, diabetes who was admitted to the hospital due to shortness of breath.    #Fever, shortness of breath, cough  #RSV infection  #ESRD on HD  Patient feeling better since admission  No evidence for consolidation on CXR  Continues to require high flow oxygen otherwise afebrile, no leukocytosis  CT without evidence for consolidation, doubt bacterial superimposed infection given clinical status  Procalcitonin elevated however questionable significance in setting of ESRD    Recommendations  Would continue supportive measures for RSV infection  No indication for antibiotics or ribavirin  Follow fever curve and WBC count    ID to sign off. Please contact as issues arise.    Con Tavarez MD  Division of Infectious Diseases

## 2023-11-27 NOTE — PROGRESS NOTE ADULT - SUBJECTIVE AND OBJECTIVE BOX
Follow Up:  rsv    Interval History/ROS:  Overnight: No acute events.  Patient remains afebrile.  Otherwise hemodynamically stable.  Latest labs show no leukocytosis, anemia 10.8/32.1.      Patient seen examined at bedside.  No new complaints.  Allergies  shellfish (Unknown)  No Known Drug Allergies        ANTIMICROBIALS:      OTHER MEDS:  MEDICATIONS  (STANDING):  albuterol    90 MICROgram(s) HFA Inhaler 2 every 8 hours  albuterol/ipratropium for Nebulization 3 every 6 hours  allopurinol 100 daily  aspirin  chewable 81 daily  atorvastatin 80 at bedtime  benzonatate 100 three times a day  carvedilol 12.5 every 12 hours  clopidogrel Tablet 75 daily  dextrose 50% Injectable 25 once  dextrose 50% Injectable 12.5 once  dextrose 50% Injectable 25 once  dextrose Oral Gel 15 once PRN  epoetin wayne (EPOGEN) Injectable 2000 once  gabapentin 100 two times a day  glucagon  Injectable 1 once  guaiFENesin  every 12 hours  heparin   Injectable 5000 every 12 hours  influenza   Vaccine 0.5 once  insulin glargine Injectable (LANTUS) 15 at bedtime  insulin lispro (ADMELOG) corrective regimen sliding scale  at bedtime  insulin lispro (ADMELOG) corrective regimen sliding scale  three times a day before meals  insulin lispro Injectable (ADMELOG) 7 three times a day before meals  senna 2 at bedtime  sertraline 25 daily  valsartan 40 two times a day      Vital Signs Last 24 Hrs  T(C): 36.4 (27 Nov 2023 11:19), Max: 36.9 (26 Nov 2023 20:06)  T(F): 97.5 (27 Nov 2023 11:19), Max: 98.4 (26 Nov 2023 20:06)  HR: 72 (27 Nov 2023 11:19) (72 - 78)  BP: 149/82 (27 Nov 2023 11:19) (149/82 - 165/81)  BP(mean): --  RR: 18 (27 Nov 2023 11:19) (16 - 18)  SpO2: 95% (27 Nov 2023 11:19) (94% - 98%)    Parameters below as of 27 Nov 2023 11:19  Patient On (Oxygen Delivery Method): room air        PHYSICAL EXAM:  Constitutional: non-toxic, no distress, HFNC in place  Cardiovascular:   normal S1, S2, no murmur, no edema  Respiratory:  clear BS bilaterally, no wheezes, no rales  GI:  soft, non-tender, normal bowel sounds  :  no delong, no CVA tenderness  Musculoskeletal:  no synovitis, normal ROM  Neurologic: awake and alert, normal strength, no focal findings  Skin:  no phlebitis  Heme/Onc: no lymphadenopathy   Psychiatric:  awake, alert, appropriate mood                            10.8   8.62  )-----------( 218      ( 27 Nov 2023 12:16 )             32.1       11-27    134<L>  |  92<L>  |  82<H>  ----------------------------<  318<H>  3.8   |  23  |  9.35<H>    Ca    7.2<L>      27 Nov 2023 06:24  Phos  7.9     11-27  Mg     2.5     11-27        Urinalysis Basic - ( 27 Nov 2023 06:24 )    Color: x / Appearance: x / SG: x / pH: x  Gluc: 318 mg/dL / Ketone: x  / Bili: x / Urobili: x   Blood: x / Protein: x / Nitrite: x   Leuk Esterase: x / RBC: x / WBC x   Sq Epi: x / Non Sq Epi: x / Bacteria: x        MICROBIOLOGY:  v                  RADIOLOGY:  imaging reviewed

## 2023-11-27 NOTE — PROGRESS NOTE ADULT - ASSESSMENT
Assessment:  62yo M, hx of ESRD on HD MWF, CAD s/p stents, CHF, DM, Brought in by EMS for shortness of breath and found to have + RSV    1 Renal -  HD today with 2.5 L net fluid loss.   2 Lytes- controlle  3 CV- Assess BP p HD;  ?change Valsartan to Entresto?    4 Anemia - hgb improving (11/25/23)  5 ID- s/p IV abx     Sayed [facility]   [employer]  [phone]

## 2023-11-27 NOTE — PROGRESS NOTE ADULT - SUBJECTIVE AND OBJECTIVE BOX
NEPHROLOGY-[facility] [phone]        Patient seen and examined in bed.  He was the same  Less coughing         MEDICATIONS  (STANDING):  albuterol    90 MICROgram(s) HFA Inhaler 2 Puff(s) Inhalation every 8 hours  albuterol/ipratropium for Nebulization 3 milliLiter(s) Nebulizer every 6 hours  allopurinol 100 milliGRAM(s) Oral daily  aspirin  chewable 81 milliGRAM(s) Oral daily  atorvastatin 80 milliGRAM(s) Oral at bedtime  benzonatate 100 milliGRAM(s) Oral three times a day  carvedilol 12.5 milliGRAM(s) Oral every 12 hours  chlorhexidine 4% Liquid 1 Application(s) Topical <User Schedule>  clopidogrel Tablet 75 milliGRAM(s) Oral daily  dextrose 5%. 1000 milliLiter(s) (100 mL/Hr) IV Continuous <Continuous>  dextrose 5%. 1000 milliLiter(s) (50 mL/Hr) IV Continuous <Continuous>  dextrose 50% Injectable 25 Gram(s) IV Push once  dextrose 50% Injectable 25 Gram(s) IV Push once  dextrose 50% Injectable 12.5 Gram(s) IV Push once  gabapentin 100 milliGRAM(s) Oral two times a day  glucagon  Injectable 1 milliGRAM(s) IntraMuscular once  guaiFENesin  milliGRAM(s) Oral every 12 hours  heparin   Injectable 5000 Unit(s) SubCutaneous every 12 hours  influenza   Vaccine 0.5 milliLiter(s) IntraMuscular once  insulin glargine Injectable (LANTUS) 15 Unit(s) SubCutaneous at bedtime  insulin lispro (ADMELOG) corrective regimen sliding scale   SubCutaneous three times a day before meals  insulin lispro (ADMELOG) corrective regimen sliding scale   SubCutaneous at bedtime  insulin lispro Injectable (ADMELOG) 7 Unit(s) SubCutaneous three times a day before meals  senna 2 Tablet(s) Oral at bedtime  sertraline 25 milliGRAM(s) Oral daily  sevelamer carbonate 800 milliGRAM(s) Oral three times a day  valsartan 40 milliGRAM(s) Oral two times a day      VITAL:  T(C): , Max: 36.9 (11-26-23 @ 20:06)  T(F): , Max: 98.4 (11-26-23 @ 20:06)  HR: 78 (11-27-23 @ 04:32)  BP: 165/81 (11-27-23 @ 04:32)  BP(mean): --  RR: 16 (11-27-23 @ 04:32)  SpO2: 98% (11-27-23 @ 04:32)  Wt(kg): --    I and O's:    11-26 @ 07:01  -  11-27 @ 07:00  --------------------------------------------------------  IN: 120 mL / OUT: 0 mL / NET: 120 mL          PHYSICAL EXAM:    Constitutional: NAD  Neck:  No JVD  Respiratory: CTAB/L  Cardiovascular: S1 and S2  Gastrointestinal: BS+, soft, NT/ND  Extremities: No peripheral edema  Neurological: A/O x 3, no focal deficits  Psychiatric: Normal mood, normal affect  : No Jay  Skin: No rashes  Access: avg    LABS:    11-27    134<L>  |  92<L>  |  82<H>  ----------------------------<  318<H>  3.8   |  23  |  9.35<H>    Ca    7.2<L>      27 Nov 2023 06:24  Phos  7.9     11-27  Mg     2.5     11-27            Urine Studies:  Urinalysis Basic - ( 27 Nov 2023 06:24 )    Color: x / Appearance: x / SG: x / pH: x  Gluc: 318 mg/dL / Ketone: x  / Bili: x / Urobili: x   Blood: x / Protein: x / Nitrite: x   Leuk Esterase: x / RBC: x / WBC x   Sq Epi: x / Non Sq Epi: x / Bacteria: x            RADIOLOGY & ADDITIONAL STUDIES:

## 2023-11-28 ENCOUNTER — TRANSCRIPTION ENCOUNTER (OUTPATIENT)
Age: 63
End: 2023-11-28

## 2023-11-28 VITALS
RESPIRATION RATE: 18 BRPM | DIASTOLIC BLOOD PRESSURE: 76 MMHG | OXYGEN SATURATION: 97 % | SYSTOLIC BLOOD PRESSURE: 115 MMHG | HEART RATE: 85 BPM | TEMPERATURE: 98 F

## 2023-11-28 LAB
GLUCOSE BLDC GLUCOMTR-MCNC: 218 MG/DL — HIGH (ref 70–99)
GLUCOSE BLDC GLUCOMTR-MCNC: 218 MG/DL — HIGH (ref 70–99)
GLUCOSE BLDC GLUCOMTR-MCNC: 230 MG/DL — HIGH (ref 70–99)
GLUCOSE BLDC GLUCOMTR-MCNC: 230 MG/DL — HIGH (ref 70–99)
GLUCOSE BLDC GLUCOMTR-MCNC: 281 MG/DL — HIGH (ref 70–99)
GLUCOSE BLDC GLUCOMTR-MCNC: 281 MG/DL — HIGH (ref 70–99)

## 2023-11-28 RX ORDER — FAMOTIDINE 10 MG/ML
1 INJECTION INTRAVENOUS
Refills: 0 | DISCHARGE

## 2023-11-28 RX ORDER — CARVEDILOL PHOSPHATE 80 MG/1
1 CAPSULE, EXTENDED RELEASE ORAL
Refills: 0 | DISCHARGE

## 2023-11-28 RX ORDER — CARVEDILOL PHOSPHATE 80 MG/1
0.5 CAPSULE, EXTENDED RELEASE ORAL
Refills: 0 | DISCHARGE
Start: 2023-11-28

## 2023-11-28 RX ORDER — VALSARTAN 80 MG/1
1 TABLET ORAL
Qty: 0 | Refills: 0 | DISCHARGE
Start: 2023-11-28

## 2023-11-28 RX ORDER — SEVELAMER CARBONATE 2400 MG/1
1 POWDER, FOR SUSPENSION ORAL
Qty: 0 | Refills: 0 | DISCHARGE
Start: 2023-11-28

## 2023-11-28 RX ORDER — VALSARTAN 80 MG/1
1 TABLET ORAL
Refills: 0 | DISCHARGE

## 2023-11-28 RX ORDER — SEVELAMER CARBONATE 2400 MG/1
2 POWDER, FOR SUSPENSION ORAL
Refills: 0 | DISCHARGE

## 2023-11-28 RX ORDER — ALBUTEROL 90 UG/1
2 AEROSOL, METERED ORAL
Qty: 0 | Refills: 0 | DISCHARGE
Start: 2023-11-28

## 2023-11-28 RX ORDER — GABAPENTIN 400 MG/1
1 CAPSULE ORAL
Refills: 0 | DISCHARGE

## 2023-11-28 RX ORDER — GABAPENTIN 400 MG/1
1 CAPSULE ORAL
Qty: 0 | Refills: 0 | DISCHARGE
Start: 2023-11-28

## 2023-11-28 RX ORDER — ALBUTEROL 90 UG/1
2 AEROSOL, METERED ORAL
Qty: 1 | Refills: 0
Start: 2023-11-28

## 2023-11-28 RX ADMIN — Medication 600 MILLIGRAM(S): at 05:55

## 2023-11-28 RX ADMIN — Medication 3: at 17:39

## 2023-11-28 RX ADMIN — Medication 81 MILLIGRAM(S): at 12:32

## 2023-11-28 RX ADMIN — Medication 100 MILLIGRAM(S): at 12:33

## 2023-11-28 RX ADMIN — Medication 100 MILLIGRAM(S): at 05:54

## 2023-11-28 RX ADMIN — GABAPENTIN 100 MILLIGRAM(S): 400 CAPSULE ORAL at 05:54

## 2023-11-28 RX ADMIN — SEVELAMER CARBONATE 800 MILLIGRAM(S): 2400 POWDER, FOR SUSPENSION ORAL at 06:06

## 2023-11-28 RX ADMIN — SEVELAMER CARBONATE 800 MILLIGRAM(S): 2400 POWDER, FOR SUSPENSION ORAL at 12:34

## 2023-11-28 RX ADMIN — SERTRALINE 25 MILLIGRAM(S): 25 TABLET, FILM COATED ORAL at 12:32

## 2023-11-28 RX ADMIN — Medication 100 MILLIGRAM(S): at 12:34

## 2023-11-28 RX ADMIN — Medication 9 UNIT(S): at 09:24

## 2023-11-28 RX ADMIN — Medication 2: at 12:31

## 2023-11-28 RX ADMIN — Medication 7 UNIT(S): at 17:40

## 2023-11-28 RX ADMIN — VALSARTAN 40 MILLIGRAM(S): 80 TABLET ORAL at 17:36

## 2023-11-28 RX ADMIN — Medication 600 MILLIGRAM(S): at 17:36

## 2023-11-28 RX ADMIN — CARVEDILOL PHOSPHATE 12.5 MILLIGRAM(S): 80 CAPSULE, EXTENDED RELEASE ORAL at 17:36

## 2023-11-28 RX ADMIN — Medication 9 UNIT(S): at 12:32

## 2023-11-28 RX ADMIN — VALSARTAN 40 MILLIGRAM(S): 80 TABLET ORAL at 05:56

## 2023-11-28 RX ADMIN — CARVEDILOL PHOSPHATE 12.5 MILLIGRAM(S): 80 CAPSULE, EXTENDED RELEASE ORAL at 05:55

## 2023-11-28 RX ADMIN — HEPARIN SODIUM 5000 UNIT(S): 5000 INJECTION INTRAVENOUS; SUBCUTANEOUS at 05:57

## 2023-11-28 RX ADMIN — Medication 2: at 09:23

## 2023-11-28 RX ADMIN — GABAPENTIN 100 MILLIGRAM(S): 400 CAPSULE ORAL at 17:36

## 2023-11-28 RX ADMIN — CLOPIDOGREL BISULFATE 75 MILLIGRAM(S): 75 TABLET, FILM COATED ORAL at 12:33

## 2023-11-28 NOTE — DISCHARGE NOTE PROVIDER - PROVIDER TOKENS
PROVIDER:[TOKEN:[[id number]],FOLLOWUP:[1 week]],PROVIDER:[TOKEN:[[id number]],FOLLOWUP:[1 week]] PROVIDER:[TOKEN:[[id number]],FOLLOWUP:[1 week]],PROVIDER:[TOKEN:[[id number]],FOLLOWUP:[1 week]] PROVIDER:[TOKEN:[[id number]],FOLLOWUP:[1 week]],PROVIDER:[TOKEN:[[id number]],FOLLOWUP:[1 week]] PROVIDER:[TOKEN:[[id number]],FOLLOWUP:[1 week]],PROVIDER:[TOKEN:[[id number]],FOLLOWUP:[1 week]],PROVIDER:[TOKEN:[[id number]]] PROVIDER:[TOKEN:[[id number]],FOLLOWUP:[1 week]],PROVIDER:[TOKEN:[[id number]],FOLLOWUP:[1 week]],PROVIDER:[TOKEN:[[id number]]] PROVIDER:[TOKEN:[[id number]],FOLLOWUP:[1 week]],PROVIDER:[TOKEN:[[id number]],FOLLOWUP:[1 week]],PROVIDER:[TOKEN:[[id number]]]

## 2023-11-28 NOTE — PROGRESS NOTE ADULT - SUBJECTIVE AND OBJECTIVE BOX
NEPHROLOGY     Patient seen and examined.    MEDICATIONS  (STANDING):  albuterol    90 MICROgram(s) HFA Inhaler 2 Puff(s) Inhalation every 8 hours  albuterol/ipratropium for Nebulization 3 milliLiter(s) Nebulizer every 6 hours  allopurinol 100 milliGRAM(s) Oral daily  aspirin  chewable 81 milliGRAM(s) Oral daily  atorvastatin 80 milliGRAM(s) Oral at bedtime  benzonatate 100 milliGRAM(s) Oral three times a day  carvedilol 12.5 milliGRAM(s) Oral every 12 hours  chlorhexidine 4% Liquid 1 Application(s) Topical <User Schedule>  clopidogrel Tablet 75 milliGRAM(s) Oral daily  dextrose 5%. 1000 milliLiter(s) (50 mL/Hr) IV Continuous <Continuous>  dextrose 5%. 1000 milliLiter(s) (100 mL/Hr) IV Continuous <Continuous>  dextrose 50% Injectable 25 Gram(s) IV Push once  dextrose 50% Injectable 12.5 Gram(s) IV Push once  dextrose 50% Injectable 25 Gram(s) IV Push once  gabapentin 100 milliGRAM(s) Oral two times a day  glucagon  Injectable 1 milliGRAM(s) IntraMuscular once  guaiFENesin  milliGRAM(s) Oral every 12 hours  heparin   Injectable 5000 Unit(s) SubCutaneous every 12 hours  influenza   Vaccine 0.5 milliLiter(s) IntraMuscular once  insulin glargine Injectable (LANTUS) 18 Unit(s) SubCutaneous at bedtime  insulin lispro (ADMELOG) corrective regimen sliding scale   SubCutaneous at bedtime  insulin lispro (ADMELOG) corrective regimen sliding scale   SubCutaneous three times a day before meals  insulin lispro Injectable (ADMELOG) 9 Unit(s) SubCutaneous before breakfast  insulin lispro Injectable (ADMELOG) 9 Unit(s) SubCutaneous before lunch  insulin lispro Injectable (ADMELOG) 7 Unit(s) SubCutaneous before dinner  senna 2 Tablet(s) Oral at bedtime  sertraline 25 milliGRAM(s) Oral daily  sevelamer carbonate 800 milliGRAM(s) Oral three times a day  valsartan 40 milliGRAM(s) Oral two times a day    VITALS:  T(C): , Max: 36.8 (11-27-23 @ 20:53)  T(F): , Max: 98.2 (11-27-23 @ 20:53)  HR: 85 (11-28-23 @ 12:27)  BP: 115/76 (11-28-23 @ 12:27)  RR: 18 (11-28-23 @ 12:27)  SpO2: 97% (11-28-23 @ 12:27)    PHYSICAL EXAM:    Constitutional: NAD  Neck:  No JVD  Respiratory: CTAB/L  Cardiovascular: S1 and S2  Gastrointestinal: BS+, soft, NT/ND  Extremities: No peripheral edema  Neurological: A/O x 3, no focal deficits  Psychiatric: Normal mood, normal affect  : No Jay  Skin: No rashes  Access: avg    LABS:                        10.8   8.62  )-----------( 218      ( 27 Nov 2023 12:16 )             32.1     11-27    134<L>  |  92<L>  |  82<H>  ----------------------------<  318<H>  3.8   |  23  |  9.35<H>    Ca    7.2<L>      27 Nov 2023 06:24  Phos  7.9     11-27  Mg     2.5     11-27   NEPHROLOGY     Patient seen and examined.  He was the same     MEDICATIONS  (STANDING):  albuterol    90 MICROgram(s) HFA Inhaler 2 Puff(s) Inhalation every 8 hours  albuterol/ipratropium for Nebulization 3 milliLiter(s) Nebulizer every 6 hours  allopurinol 100 milliGRAM(s) Oral daily  aspirin  chewable 81 milliGRAM(s) Oral daily  atorvastatin 80 milliGRAM(s) Oral at bedtime  benzonatate 100 milliGRAM(s) Oral three times a day  carvedilol 12.5 milliGRAM(s) Oral every 12 hours  chlorhexidine 4% Liquid 1 Application(s) Topical <User Schedule>  clopidogrel Tablet 75 milliGRAM(s) Oral daily  dextrose 5%. 1000 milliLiter(s) (50 mL/Hr) IV Continuous <Continuous>  dextrose 5%. 1000 milliLiter(s) (100 mL/Hr) IV Continuous <Continuous>  dextrose 50% Injectable 25 Gram(s) IV Push once  dextrose 50% Injectable 12.5 Gram(s) IV Push once  dextrose 50% Injectable 25 Gram(s) IV Push once  gabapentin 100 milliGRAM(s) Oral two times a day  glucagon  Injectable 1 milliGRAM(s) IntraMuscular once  guaiFENesin  milliGRAM(s) Oral every 12 hours  heparin   Injectable 5000 Unit(s) SubCutaneous every 12 hours  influenza   Vaccine 0.5 milliLiter(s) IntraMuscular once  insulin glargine Injectable (LANTUS) 18 Unit(s) SubCutaneous at bedtime  insulin lispro (ADMELOG) corrective regimen sliding scale   SubCutaneous at bedtime  insulin lispro (ADMELOG) corrective regimen sliding scale   SubCutaneous three times a day before meals  insulin lispro Injectable (ADMELOG) 9 Unit(s) SubCutaneous before breakfast  insulin lispro Injectable (ADMELOG) 9 Unit(s) SubCutaneous before lunch  insulin lispro Injectable (ADMELOG) 7 Unit(s) SubCutaneous before dinner  senna 2 Tablet(s) Oral at bedtime  sertraline 25 milliGRAM(s) Oral daily  sevelamer carbonate 800 milliGRAM(s) Oral three times a day  valsartan 40 milliGRAM(s) Oral two times a day    VITALS:  T(C): , Max: 36.8 (11-27-23 @ 20:53)  T(F): , Max: 98.2 (11-27-23 @ 20:53)  HR: 85 (11-28-23 @ 12:27)  BP: 115/76 (11-28-23 @ 12:27)  RR: 18 (11-28-23 @ 12:27)  SpO2: 97% (11-28-23 @ 12:27)    PHYSICAL EXAM:    Constitutional: NAD  Neck:  No JVD  Respiratory: CTAB/L  Cardiovascular: S1 and S2  Gastrointestinal: BS+, soft, NT/ND  Extremities: No peripheral edema  Neurological: A/O x 3, no focal deficits  Psychiatric: Normal mood, normal affect  : No Jay  Skin: No rashes  Access: avg    LABS:                        10.8   8.62  )-----------( 218      ( 27 Nov 2023 12:16 )             32.1     11-27    134<L>  |  92<L>  |  82<H>  ----------------------------<  318<H>  3.8   |  23  |  9.35<H>    Ca    7.2<L>      27 Nov 2023 06:24  Phos  7.9     11-27  Mg     2.5     11-27

## 2023-11-28 NOTE — DISCHARGE NOTE NURSING/CASE MANAGEMENT/SOCIAL WORK - NSDCPEFALRISK_GEN_ALL_CORE
For information on Fall & Injury Prevention, visit: [email or web address] OR  [email or web address] OR  https://www.cdc.gov/steadi/patient.html

## 2023-11-28 NOTE — DISCHARGE NOTE PROVIDER - NPI NUMBER (FOR SYSADMIN USE ONLY) :
[[id number]],[[id number]] [[id number]],[[id number]] [[id number]],[[id number]] [[id number]],[[id number]],[[id number]] [[id number]],[[id number]],[[id number]] [[id number]],[[id number]],[[id number]]

## 2023-11-28 NOTE — DISCHARGE NOTE NURSING/CASE MANAGEMENT/SOCIAL WORK - NSDCFUADDAPPT_GEN_ALL_CORE_FT
APPTS ARE READY TO BE MADE: [ x] YES    Best Family or Patient Contact (if needed):    Additional Information about above appointments (if needed):    1:   2:   3:     Other comments or requests:       Patient/Caregiver was provided with follow up request details for Dr. Leonard, Haider Franklin, and Steve Heart and prefers to call the providers office on their own to schedule.

## 2023-11-28 NOTE — DISCHARGE NOTE PROVIDER - NSDCFUADDAPPT_GEN_ALL_CORE_FT
APPTS ARE READY TO BE MADE: [ x] YES    Best Family or Patient Contact (if needed):    Additional Information about above appointments (if needed):    1:   2:   3:     Other comments or requests:    APPTS ARE READY TO BE MADE: [ x] YES    Best Family or Patient Contact (if needed):    Additional Information about above appointments (if needed):    1:   2:   3:     Other comments or requests:       Patient/Caregiver was provided with follow up request details for Dr. Leonard, Haider Franklin, and Steve Heart and prefers to call the providers office on their own to schedule. APPTS ARE READY TO BE MADE: [ x] YES    Best Family or Patient Contact (if needed):    Additional Information about above appointments (if needed):    1:   2:   3:     Other comments or requests:     Patient is scheduled to see Dr. Asher at 11:00AM on 12/14 at [address] [place] [address]    Patient/Caregiver was provided with follow up request details for Dr. Leonard, Haider Franklin, and Steve Heart and prefers to call the providers office on their own to schedule. APPTS ARE READY TO BE MADE: [ x] YES    Best Family or Patient Contact (if needed):    Additional Information about above appointments (if needed):    1:   2:   3:     Other comments or requests:     Patient is scheduled to see Dr. Asher at 11:00AM on 12/14 at [address] [place] [address]    Patient/Caregiver was provided with follow up request details for Dr. Leonard, Haider Franklin, and Steve Heart and prefers to call the providers office on their own to schedule. APPTS ARE READY TO BE MADE: [ x] YES    Best Family or Patient Contact (if needed):    Additional Information about above appointments (if needed):    1:   2:   3:     Other comments or requests:     Patient is scheduled to see Dr. Asher at 11:00AM on 12/14 at [address] [place] [address]    Patient/Caregiver was provided with follow up request details for Dr. Leonard, Haider Franklin, and Steve Heart and prefers to call the providers office on their own to schedule.

## 2023-11-28 NOTE — DISCHARGE NOTE PROVIDER - HOSPITAL COURSE
CHIEF COMPLAINT:Patient is a 63y old  Male who presents with a chief complaint of SOB (24 Nov 2023 09:11)  62yo M, hx of ESRD on HD MWF, CAD s/p stents, CHF, DM, Brought in by EMS for shortness of breath.  Patient reported not feeling well for the past 2 to 3 days with productive cough.  Per EMS febrile, Tmax 103.  On scene, patient was hypoxic, satting around 88 to 92% on room air.  This improved with fall liters nasal cannula.  Patient also has positive vomiting, however denies chest pain or shortness of breath at the moment.  per EMS, family states this is similar to his previous presentation for sepsis or pneumonia. History limited as patient is lethargic.  of note, full run of dialysis is on Monday.     CHIEF COMPLAINT:Patient is a 63y old  Male who presents with a chief complaint of SOB (24 Nov 2023 09:11)  64yo M, hx of ESRD on HD MWF, CAD s/p stents, CHF, DM, Brought in by EMS for shortness of breath.  Patient reported not feeling well for the past 2 to 3 days with productive cough.  Per EMS febrile, Tmax 103.  On scene, patient was hypoxic, satting around 88 to 92% on room air.  This improved with fall liters nasal cannula.  Patient also has positive vomiting, however denies chest pain or shortness of breath at the moment.  per EMS, family states this is similar to his previous presentation for sepsis or pneumonia. History limited as patient is lethargic.  of note, full run of dialysis is on Monday.     CHIEF COMPLAINT:Patient is a 63y old  Male who presents with a chief complaint of SOB (24 Nov 2023 09:11)  64yo M, hx of ESRD on HD MWF, CAD s/p stents, CHF, DM, Brought in by EMS for shortness of breath.  Patient reported not feeling well for the past 2 to 3 days with productive cough.  Per EMS febrile, Tmax 103.  On scene, patient was hypoxic, satting around 88 to 92% on room air.  This improved with fall liters nasal cannula.  Patient also has positive vomiting, however denies chest pain or shortness of breath at the moment.  per EMS, family states this is similar to his previous presentation for sepsis or pneumonia. History limited as patient is lethargic.  of note, full run of dialysis is on Monday.    Hospital course:  Pt admitted with Acute hypoxic respiratory failure likely 2/2 missed HD and RSV , on RA, possible RLL PNA - x1 zosyn in ED, s/p CTX, ID following, s/p 2L NC on RA.  Renal followed for ESRD on HD MWF.  CAD - asa, started plavix.  Endo followed for DM. Acute issues resolved.  Patient has been medically cleared for discharge as per Dr. Leonard.  Patient has been given appropriate discharge instructions including medication regimen, access site management and follow up. Medications that patient needs refills on (+/- new medications) have been e-prescribed to preferred pharmacy. Patient will f/u with Dr. Leonard and Sabra in 1-2 weeks for further management.     Important Medication Changes and Reason:    Active or Pending Issues Requiring Follow-up:    Advanced Directives:   [x ] Full code  [ ] DNR  [ ] Hospice    Discharge Diagnoses:  Acute hypoxic respiratory failure likely 2/2 missed HD and RSV  RSV+  ?RLL PNA    CHIEF COMPLAINT:Patient is a 63y old  Male who presents with a chief complaint of SOB (24 Nov 2023 09:11)  62yo M, hx of ESRD on HD MWF, CAD s/p stents, CHF, DM, Brought in by EMS for shortness of breath.  Patient reported not feeling well for the past 2 to 3 days with productive cough.  Per EMS febrile, Tmax 103.  On scene, patient was hypoxic, satting around 88 to 92% on room air.  This improved with fall liters nasal cannula.  Patient also has positive vomiting, however denies chest pain or shortness of breath at the moment.  per EMS, family states this is similar to his previous presentation for sepsis or pneumonia. History limited as patient is lethargic.  of note, full run of dialysis is on Monday.    Hospital course:  Pt admitted with Acute hypoxic respiratory failure likely 2/2 missed HD and RSV , on RA, possible RLL PNA - x1 zosyn in ED, s/p CTX, ID following, s/p 2L NC on RA.  Renal followed for ESRD on HD MWF.  CAD - asa, started plavix.  Endo followed for DM. Acute issues resolved.  Patient has been medically cleared for discharge as per Dr. Leonard.  Patient has been given appropriate discharge instructions including medication regimen, access site management and follow up. Medications that patient needs refills on (+/- new medications) have been e-prescribed to preferred pharmacy. Patient will f/u with Dr. Leonard and Sabra in 1-2 weeks for further management.     Important Medication Changes and Reason:    Active or Pending Issues Requiring Follow-up:    Advanced Directives:   [x ] Full code  [ ] DNR  [ ] Hospice    Discharge Diagnoses:  Acute hypoxic respiratory failure likely 2/2 missed HD and RSV  RSV+  ?RLL PNA    CHIEF COMPLAINT:Patient is a 63y old  Male who presents with a chief complaint of SOB (24 Nov 2023 09:11)  64yo M, hx of ESRD on HD MWF, CAD s/p stents, CHF, DM, Brought in by EMS for shortness of breath.  Patient reported not feeling well for the past 2 to 3 days with productive cough.  Per EMS febrile, Tmax 103.  On scene, patient was hypoxic, satting around 88 to 92% on room air.  This improved with fall liters nasal cannula.  Patient also has positive vomiting, however denies chest pain or shortness of breath at the moment.  per EMS, family states this is similar to his previous presentation for sepsis or pneumonia. History limited as patient is lethargic.  of note, full run of dialysis is on Monday.    Hospital course:  Pt admitted with Acute hypoxic respiratory failure likely 2/2 missed HD and RSV , on RA, possible RLL PNA - x1 zosyn in ED, s/p CTX, ID following, s/p 2L NC on RA.  Renal followed for ESRD on HD MWF.  CAD - asa, started plavix.  Endo followed for DM. Acute issues resolved.  Patient has been medically cleared for discharge as per Dr. Leonard.  Patient has been given appropriate discharge instructions including medication regimen, access site management and follow up. Medications that patient needs refills on (+/- new medications) have been e-prescribed to preferred pharmacy. Patient will f/u with Dr. Leonard and Sabra in 1-2 weeks for further management.     Important Medication Changes and Reason:    Active or Pending Issues Requiring Follow-up:    Advanced Directives:   [x ] Full code  [ ] DNR  [ ] Hospice    Discharge Diagnoses:  Acute hypoxic respiratory failure likely 2/2 missed HD and RSV  RSV+     CHIEF COMPLAINT:Patient is a 63y old  Male who presents with a chief complaint of SOB (24 Nov 2023 09:11)  62yo M, hx of ESRD on HD MWF, CAD s/p stents, CHF, DM, Brought in by EMS for shortness of breath.  Patient reported not feeling well for the past 2 to 3 days with productive cough.  Per EMS febrile, Tmax 103.  On scene, patient was hypoxic, satting around 88 to 92% on room air.  This improved with fall liters nasal cannula.  Patient also has positive vomiting, however denies chest pain or shortness of breath at the moment.  per EMS, family states this is similar to his previous presentation for sepsis or pneumonia. History limited as patient is lethargic.  of note, full run of dialysis is on Monday.    Hospital course:  Pt admitted with Acute hypoxic respiratory failure likely 2/2 missed HD and RSV , on RA, possible RLL PNA - x1 zosyn in ED, s/p CTX, ID following, s/p 2L NC on RA.  Renal followed for ESRD on HD MWF.  CAD - asa, started plavix.  Endo followed for DM. Acute issues resolved.  Patient has been medically cleared for discharge as per Dr. Leonard.  Patient has been given appropriate discharge instructions including medication regimen, access site management and follow up. Medications that patient needs refills on (+/- new medications) have been e-prescribed to preferred pharmacy. Patient will f/u with Dr. Leonard and Sabra in 1-2 weeks for further management.     Important Medication Changes and Reason:    Active or Pending Issues Requiring Follow-up:    Advanced Directives:   [x ] Full code  [ ] DNR  [ ] Hospice    Discharge Diagnoses:  Acute hypoxic respiratory failure likely 2/2 missed HD and RSV  RSV+

## 2023-11-28 NOTE — DISCHARGE NOTE PROVIDER - NSDCCPCAREPLAN_GEN_ALL_CORE_FT
PRINCIPAL DISCHARGE DIAGNOSIS  Diagnosis: Acute respiratory failure with hypoxia  Assessment and Plan of Treatment: Improved      SECONDARY DISCHARGE DIAGNOSES  Diagnosis: Fever  Assessment and Plan of Treatment: You were positive for RSV.  Fever resolved    Diagnosis: Hyperglycemia due to diabetes mellitus  Assessment and Plan of Treatment:   Make sure you get your HgA1c checked every three months.  If you take oral diabetes medications, check your blood glucose two times a day.  If you take insulin, check your blood glucose before meals and at bedtime.  It's important not to skip any meals.  Keep a log of your blood glucose results and always take it with you to your doctor appointments.  Keep a list of your current medications including injectables and over the counter medications and bring this medication list with you to all your doctor appointments.  If you have not seen your ophthalmologist this year call for appointment.  Check your feet daily for redness, sores, or openings. Do not self treat. If no improvement in two days call your primary care physician for an appointment.  Low blood sugar (hypoglycemia) is a blood sugar below 70mg/dl. Check your blood sugar if you feel signs/symptoms of hypoglycemia. If your blood sugar is below 70 take 15 grams of carbohydrates (ex 4 oz of apple juice, 3-4 glucose tablets, or 4-6 oz of regular soda) wait 15 minutes and repeat blood sugar to make sure it comes up above 70.  If your blood sugar is above 70 and you are due for a meal, have a meal.  If you are not due for a meal have a snack.  This snack helps keeps your blood sugar at a safe range.    Diagnosis: ESRD on dialysis  Assessment and Plan of Treatment: Continue with your dialysis treatments. Follow with your nephrologist (kidney physician). Continue your medications as prescribed.

## 2023-11-28 NOTE — DISCHARGE NOTE NURSING/CASE MANAGEMENT/SOCIAL WORK - NSDCVIVACCINE_GEN_ALL_CORE_FT
COVID-19, mRNA, LNP-S, PF, 30 mcg/0.3 mL dose (Pfizer); 09-Dec-2021 11:24; Sobeida Kang (RN); [employer]; [id number] (Exp. Date: 31-Dec-2021); IntraMuscular; Deltoid Left.; 0.3 milliLiter(s);   influenza, injectable, quadrivalent, preservative free; 09-Dec-2021 11:25; Sobeida Kang (SELVIN); [employer]; [id number] (Exp. Date: 30-Jun-2022); IntraMuscular; Deltoid Right.; 0.5 milliLiter(s); VIS (VIS Published: 06-Aug-2021, VIS Presented: 09-Dec-2021);

## 2023-11-28 NOTE — DISCHARGE NOTE PROVIDER - CARE PROVIDERS DIRECT ADDRESSES
,DirectAddress_Unknown,DirectAddress_Unknown ,DirectAddress_Unknown,DirectAddress_Unknown,[email or web address] ,DirectAddress_Unknown,DirectAddress_Unknown,[email or web address] ,DirectAddress_Unknown,DirectAddress_Unknown,[email or web address]

## 2023-11-28 NOTE — DISCHARGE NOTE PROVIDER - CARE PROVIDER_API CALL
Lesly eLonard  Cardiovascular Disease  [address] [place] [address] [address]  [place] [address]  Phone: [phone]  Fax: [phone]  Follow Up Time: 1 week    SAMIRA MILLARD  [address] [place] [address]  Phone: ()-  Fax: [phone])-  Follow Up Time: 1 week   Lesly Leonard  Cardiovascular Disease  [address] [place] [address] [address]  [place] [address]  Phone: [phone]  Fax: [phone]  Follow Up Time: 1 week    SAMIRA MILLARD  [address] [place] [address]  Phone: ()-  Fax: ()-  Follow Up Time: 1 week   Lesly Leonard  Cardiovascular Disease  [address] [place] [address] [address]  [place] [address]  Phone: [phone]  Fax: [phone]  Follow Up Time: 1 week    SAMIRA MILLARD  [address] [place] [address]  Phone: ()-  Fax: ()-  Follow Up Time: 1 week   Lesly Leonard  Cardiovascular Disease  [address] [facility] [address]  [place] [address]  Phone: [phone]  Fax: [phone]  Follow Up Time: 1 week    SAMIRA MILLARD  [address] [place] [address]  Phone: ()-  Fax: ()-  Follow Up Time: 1 week    Steve Hernandez  Endocrinology/Metab/Diabetes  [address] [facility] [address]  [place] [address]  Phone: [phone]  Fax: [phone]  Follow Up Time:    Lesly Leonard  Cardiovascular Disease  [address] [facility] [address]  [place] [address]  Phone: [phone]  Fax: [phone]  Follow Up Time: 1 week    SAMIRA MILLARD  [address] [place] [address]  Phone: ()-  Fax: ()-  Follow Up Time: 1 week    Steve Hernandez  Endocrinology/Metab/Diabetes  [address] [facility] [address]  [place] [address]  Phone: [phone]  Fax: [phone]  Follow Up Time:    Lesly Leonard  Cardiovascular Disease  [address] [facility] [address]  [place] [address]  Phone: [phone]  Fax: [phone]  Follow Up Time: 1 week    SAMIRA MILLARD  [address] [place] [address]  Phone: ()-  Fax: ()-  Follow Up Time: 1 week    Steve Hernandez  Endocrinology/Metab/Diabetes  [address] [facility] [address]  [place] [address]  Phone: [phone]  Fax: [phone]  Follow Up Time:

## 2023-11-28 NOTE — DISCHARGE NOTE NURSING/CASE MANAGEMENT/SOCIAL WORK - PATIENT PORTAL LINK FT
You can access the FollowMyHealth Patient Portal offered by [facility] by registering at the following website: [email or web address]. By joining [facility] [employer]’s FollowMyHealth portal, you will also be able to view your health information using other applications (apps) compatible with our system.

## 2023-11-28 NOTE — PROGRESS NOTE ADULT - SUBJECTIVE AND OBJECTIVE BOX
Date of Service: 11-28-23 @ 07:39           CARDIOLOGY     PROGRESS  NOTE   ________________________________________________    CHIEF COMPLAINT:Patient is a 63y old  Male who presents with a chief complaint of sob (27 Nov 2023 19:54)  no complain  	  REVIEW OF SYSTEMS:  CONSTITUTIONAL: No fever, weight loss, or fatigue  EYES: No eye pain, visual disturbances, or discharge  ENT:  No difficulty hearing, tinnitus, vertigo; No sinus or throat pain  NECK: No pain or stiffness  RESPIRATORY: No cough, wheezing, chills or hemoptysis; No Shortness of Breath  CARDIOVASCULAR: No chest pain, palpitations, passing out, dizziness, or leg swelling  GASTROINTESTINAL: No abdominal or epigastric pain. No nausea, vomiting, or hematemesis; No diarrhea or constipation. No melena or hematochezia.  GENITOURINARY: No dysuria, frequency, hematuria, or incontinence  NEUROLOGICAL: No headaches, memory loss, loss of strength, numbness, or tremors  SKIN: No itching, burning, rashes, or lesions   LYMPH Nodes: No enlarged glands  ENDOCRINE: No heat or cold intolerance; No hair loss  MUSCULOSKELETAL: No joint pain or swelling; No muscle, back, or extremity pain  PSYCHIATRIC: No depression, anxiety, mood swings, or difficulty sleeping  HEME/LYMPH: No easy bruising, or bleeding gums  ALLERGY AND IMMUNOLOGIC: No hives or eczema	    [x ] All others negative	  [ ] Unable to obtain    PHYSICAL EXAM:  T(C): 36.8 (11-28-23 @ 04:50), Max: 36.8 (11-27-23 @ 20:53)  HR: 79 (11-28-23 @ 04:50) (72 - 86)  BP: 120/66 (11-28-23 @ 04:50) (103/51 - 149/82)  RR: 18 (11-28-23 @ 04:50) (18 - 18)  SpO2: 95% (11-28-23 @ 04:50) (94% - 98%)  Wt(kg): --  I&O's Summary    27 Nov 2023 07:01  -  28 Nov 2023 07:00  --------------------------------------------------------  IN: 550 mL / OUT: 2200 mL / NET: -1650 mL        Appearance: Normal	  HEENT:   Normal oral mucosa, PERRL, EOMI	  Lymphatic: No lymphadenopathy  Cardiovascular: Normal S1 S2, No JVD, + murmurs, No edema  Respiratory:rhonchi  Psychiatry: A & O x 3, Mood & affect appropriate  Gastrointestinal:  Soft, Non-tender, + BS	  Skin: No rashes, No ecchymoses, No cyanosis	  Neurologic: Non-focal  Extremities: Normal range of motion, No clubbing, cyanosis or edema  Vascular: + pvd    MEDICATIONS  (STANDING):  albuterol    90 MICROgram(s) HFA Inhaler 2 Puff(s) Inhalation every 8 hours  albuterol/ipratropium for Nebulization 3 milliLiter(s) Nebulizer every 6 hours  allopurinol 100 milliGRAM(s) Oral daily  aspirin  chewable 81 milliGRAM(s) Oral daily  atorvastatin 80 milliGRAM(s) Oral at bedtime  benzonatate 100 milliGRAM(s) Oral three times a day  carvedilol 12.5 milliGRAM(s) Oral every 12 hours  chlorhexidine 4% Liquid 1 Application(s) Topical <User Schedule>  clopidogrel Tablet 75 milliGRAM(s) Oral daily  dextrose 5%. 1000 milliLiter(s) (50 mL/Hr) IV Continuous <Continuous>  dextrose 5%. 1000 milliLiter(s) (100 mL/Hr) IV Continuous <Continuous>  dextrose 50% Injectable 25 Gram(s) IV Push once  dextrose 50% Injectable 12.5 Gram(s) IV Push once  dextrose 50% Injectable 25 Gram(s) IV Push once  gabapentin 100 milliGRAM(s) Oral two times a day  glucagon  Injectable 1 milliGRAM(s) IntraMuscular once  guaiFENesin  milliGRAM(s) Oral every 12 hours  heparin   Injectable 5000 Unit(s) SubCutaneous every 12 hours  influenza   Vaccine 0.5 milliLiter(s) IntraMuscular once  insulin glargine Injectable (LANTUS) 18 Unit(s) SubCutaneous at bedtime  insulin lispro (ADMELOG) corrective regimen sliding scale   SubCutaneous at bedtime  insulin lispro (ADMELOG) corrective regimen sliding scale   SubCutaneous three times a day before meals  insulin lispro Injectable (ADMELOG) 9 Unit(s) SubCutaneous before breakfast  insulin lispro Injectable (ADMELOG) 9 Unit(s) SubCutaneous before lunch  insulin lispro Injectable (ADMELOG) 7 Unit(s) SubCutaneous before dinner  senna 2 Tablet(s) Oral at bedtime  sertraline 25 milliGRAM(s) Oral daily  sevelamer carbonate 800 milliGRAM(s) Oral three times a day  valsartan 40 milliGRAM(s) Oral two times a day      TELEMETRY: 	    ECG:  	  RADIOLOGY:  OTHER: 	  	  LABS:	 	    CARDIAC MARKERS:                                10.8   8.62  )-----------( 218      ( 27 Nov 2023 12:16 )             32.1     11-27    134<L>  |  92<L>  |  82<H>  ----------------------------<  318<H>  3.8   |  23  |  9.35<H>    Ca    7.2<L>      27 Nov 2023 06:24  Phos  7.9     11-27  Mg     2.5     11-27      proBNP:   Lipid Profile:   HgA1c:   TSH:         Assessment and plan  ---------------------------  62yo M, hx of ESRD on HD MWF, CAD s/p stents, CHF, DM, Brought in by EMS for shortness of breath.  Patient reported not feeling well for the past 2 to 3 days with productive cough.  Per EMS febrile, Tmax 103.  On scene, patient was hypoxic, satting around 88 to 92% on room air.  This improved with fall liters nasal cannula.  Patient also has positive vomiting, however denies chest pain or shortness of breath at the moment.  per EMS, family states this is similar to his previous presentation for sepsis or pneumonia. History limited as patient is lethargic of note, full run of dialysis is on Monday.  pt with hx of ASHD ESRD on HD CHF, DM with increasing sob, pt had a recent  visit in my office ,now with increasing sob.  rvp is pos RSV  ID has called, consult noted  ESRD on HD  continue all cardiac meds, plavix with sig cad  dvt prophylaxis  will adjust bp meds  oob to chair  dvt prophylaxis  o2  level, on hypoxemic, continuer current management  inhalers  bp is better controlled  add Duoneb, check cbc, wbc has improved  dc planning

## 2023-11-28 NOTE — PROGRESS NOTE ADULT - SUBJECTIVE AND OBJECTIVE BOX
date of service: 11-28-23 @ 08:42  afberile  REVIEW OF SYSTEMS:  CONSTITUTIONAL: No fever,  no  weight loss  ENT:  No  tinnitus,   no   vertigo  NECK: No pain or stiffness  RESPIRATORY: No cough, wheezing, chills or hemoptysis;    No Shortness of Breath  CARDIOVASCULAR: No chest pain, palpitations, dizziness  GASTROINTESTINAL: No abdominal or epigastric pain. No nausea, vomiting, or hematemesis; No diarrhea  No melena or hematochezia.  GENITOURINARY: No dysuria, frequency, hematuria, or incontinence  NEUROLOGICAL: No headaches  SKIN: No itching,  no   rash  LYMPH Nodes: No enlarged glands  ENDOCRINE: No heat or cold intolerance  MUSCULOSKELETAL: No joint pain or swelling  PSYCHIATRIC: No depression, anxiety  HEME/LYMPH: No easy bruising, or bleeding gums  ALLERGY AND IMMUNOLOGIC: No hives or eczema	    MEDICATIONS  (STANDING):  albuterol    90 MICROgram(s) HFA Inhaler 2 Puff(s) Inhalation every 8 hours  albuterol/ipratropium for Nebulization 3 milliLiter(s) Nebulizer every 6 hours  allopurinol 100 milliGRAM(s) Oral daily  aspirin  chewable 81 milliGRAM(s) Oral daily  atorvastatin 80 milliGRAM(s) Oral at bedtime  benzonatate 100 milliGRAM(s) Oral three times a day  carvedilol 12.5 milliGRAM(s) Oral every 12 hours  chlorhexidine 4% Liquid 1 Application(s) Topical <User Schedule>  clopidogrel Tablet 75 milliGRAM(s) Oral daily  dextrose 5%. 1000 milliLiter(s) (100 mL/Hr) IV Continuous <Continuous>  dextrose 5%. 1000 milliLiter(s) (50 mL/Hr) IV Continuous <Continuous>  dextrose 50% Injectable 25 Gram(s) IV Push once  dextrose 50% Injectable 12.5 Gram(s) IV Push once  dextrose 50% Injectable 25 Gram(s) IV Push once  gabapentin 100 milliGRAM(s) Oral two times a day  glucagon  Injectable 1 milliGRAM(s) IntraMuscular once  guaiFENesin  milliGRAM(s) Oral every 12 hours  heparin   Injectable 5000 Unit(s) SubCutaneous every 12 hours  influenza   Vaccine 0.5 milliLiter(s) IntraMuscular once  insulin glargine Injectable (LANTUS) 18 Unit(s) SubCutaneous at bedtime  insulin lispro (ADMELOG) corrective regimen sliding scale   SubCutaneous at bedtime  insulin lispro (ADMELOG) corrective regimen sliding scale   SubCutaneous three times a day before meals  insulin lispro Injectable (ADMELOG) 9 Unit(s) SubCutaneous before breakfast  insulin lispro Injectable (ADMELOG) 9 Unit(s) SubCutaneous before lunch  insulin lispro Injectable (ADMELOG) 7 Unit(s) SubCutaneous before dinner  senna 2 Tablet(s) Oral at bedtime  sertraline 25 milliGRAM(s) Oral daily  sevelamer carbonate 800 milliGRAM(s) Oral three times a day  valsartan 40 milliGRAM(s) Oral two times a day    MEDICATIONS  (PRN):  dextrose Oral Gel 15 Gram(s) Oral once PRN Blood Glucose LESS THAN 70 milliGRAM(s)/deciliter      Vital Signs Last 24 Hrs  T(C): 36.8 (28 Nov 2023 04:50), Max: 36.8 (27 Nov 2023 20:53)  T(F): 98.2 (28 Nov 2023 04:50), Max: 98.2 (27 Nov 2023 20:53)  HR: 79 (28 Nov 2023 04:50) (72 - 86)  BP: 120/66 (28 Nov 2023 04:50) (103/51 - 149/82)  BP(mean): --  RR: 18 (28 Nov 2023 04:50) (18 - 18)  SpO2: 95% (28 Nov 2023 04:50) (94% - 98%)    Parameters below as of 28 Nov 2023 04:50  Patient On (Oxygen Delivery Method): room air      CAPILLARY BLOOD GLUCOSE      POCT Blood Glucose.: 311 mg/dL (27 Nov 2023 20:51)  POCT Blood Glucose.: 237 mg/dL (27 Nov 2023 17:50)  POCT Blood Glucose.: 240 mg/dL (27 Nov 2023 11:13)    I&O's Summary    27 Nov 2023 07:01  -  28 Nov 2023 07:00  --------------------------------------------------------  IN: 550 mL / OUT: 2200 mL / NET: -1650 mL          Appearance: Normal	  HEENT:   Normal oral mucosa, PERRL, EOMI	  Lymphatic: No lymphadenopathy  Cardiovascular: Normal S1 S2, No JVD  Respiratory: Lungs clear to auscultation	  Gastrointestinal:  Soft, Non-tender, + BS	  Skin: No rash, No ecchymoses	  Extremities:     LABS:                        10.8   8.62  )-----------( 218      ( 27 Nov 2023 12:16 )             32.1     11-27    134<L>  |  92<L>  |  82<H>  ----------------------------<  318<H>  3.8   |  23  |  9.35<H>    Ca    7.2<L>      27 Nov 2023 06:24  Phos  7.9     11-27  Mg     2.5     11-27            Urinalysis Basic - ( 27 Nov 2023 06:24 )    Color: x / Appearance: x / SG: x / pH: x  Gluc: 318 mg/dL / Ketone: x  / Bili: x / Urobili: x   Blood: x / Protein: x / Nitrite: x   Leuk Esterase: x / RBC: x / WBC x   Sq Epi: x / Non Sq Epi: x / Bacteria: x                      Consultant(s) Notes Reviewed:      Care Discussed with Consultants/Other Providers:

## 2023-11-28 NOTE — PROGRESS NOTE ADULT - ASSESSMENT
63    yr      hx CAD   s/p  2 drug eluding stents 11/2022       CHF, DM, gout, ESRD on dialysis      c/c  weakness  of   limbs/  neuro dr mccain/ h/o   gout, right  wrist,  has  functional  quadriplegia/  CT  head, . 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      pt  with  h/o intermittent  right  arm /leg weakness  has been   c/c  for  yrs/  with  unclear  etiology / MRI  head/  c spine in 10/22,  no cord  compression       seen by neuro in  past,   per  wife,  thinks  pt  has  a psychological  component  regarding  his  weakness/  s/p Strep pneumoniae  bacteremia  and pna           now admitted  with fevers/   sob,  from viral  sx          RSV  +/  supportive care.  appears  well.  not hypoxic . seen  by ID            on  iv rocephin/ CT  chest,  pna.  and  per  ID,  ab  not  needed     Gout    CAD,    s/p    pci.        on  asa/ plavix/   lipitor,          ef  45. on 5/.23     CKD, on HD           renal  dr dickerson      c/c  anemia     DM,  follow  fs           on lantus     on  dvt ppx/ gabapentin,  asa.  lipitor,  coreg., valsartan,  lantus,  zolofr. allopurinol  RSV. supportive  care/ PT  eval     hyperglycemia   hous e  endo to  titrate   on asa.  coreg, plavix          rad< from: TTE W or WO Ultrasound Enhancing Agent (05.24.23 @ 12:00) >  CONCLUSIONS:     1. The left ventricular systolic function is mildly decreased with an ejection fraction visually estimated at 45 to 50 %. There are regional wall motion abnormalities.   2. Mid and apical anterior septum and apex are abnormal.   3. Normal systolic function. The tricuspid annular plane systolic excursion (TAPSE) is 1.8 cm (normal >=1.7 cm).   4. No prior echocardiogram is available for comparison.    _____________________________________________________________  < end of copied text >

## 2023-11-28 NOTE — DISCHARGE NOTE PROVIDER - NSDCMRMEDTOKEN_GEN_ALL_CORE_FT
allopurinol 100 mg oral tablet: 1 tab(s) orally once a day  aspirin 81 mg oral tablet: 1 tab(s) orally once a day  atorvastatin 80 mg oral tablet: 1 tab(s) orally once a day  clopidogrel 75 mg oral tablet: 1 tab(s) orally once a day  Coreg 12.5 mg oral tablet: 1 tab(s) orally 2 times a day evening dose cut in half before dialysis days, take only QHs on HD days, regular dosing on Saturday  famotidine 10 mg oral tablet: 1 tab(s) orally once a day  folic acid 1 mg oral tablet: 1 tab(s) orally once a day  gabapentin 300 mg oral tablet: 1 tab(s) orally 2 times a day  HumaLOG 100 units/mL subcutaneous solution: 10 unit(s) subcutaneous 3 times a day  Senna 8.6 mg oral tablet: 2 tab(s) orally once a day (at bedtime)  sevelamer carbonate 800 mg oral tablet: 2 tab(s) orally 3 times a day  Toujeo SoloStar 300 units/mL subcutaneous solution: 20 unit(s) subcutaneous once a day (at bedtime)  valsartan 40 mg oral tablet: 1 tab(s) orally once a day  Zoloft 25 mg oral tablet: 1 tab(s) orally once a day   allopurinol 100 mg oral tablet: 1 tab(s) orally once a day  aspirin 81 mg oral tablet: 1 tab(s) orally once a day  atorvastatin 80 mg oral tablet: 1 tab(s) orally once a day  carvedilol 12.5 mg oral tablet: 1 tab(s) orally every 12 hours  clopidogrel 75 mg oral tablet: 1 tab(s) orally once a day  folic acid 1 mg oral tablet: 1 tab(s) orally once a day  gabapentin 100 mg oral capsule: 1 cap(s) orally 2 times a day  HumaLOG 100 units/mL subcutaneous solution: 10 unit(s) subcutaneous 3 times a day  Senna 8.6 mg oral tablet: 2 tab(s) orally once a day (at bedtime)  sevelamer carbonate 800 mg oral tablet: 1 tab(s) orally 3 times a day  Toujeo SoloStar 300 units/mL subcutaneous solution: 20 unit(s) subcutaneous once a day (at bedtime)  valsartan 40 mg oral tablet: 1 tab(s) orally 2 times a day  Zoloft 25 mg oral tablet: 1 tab(s) orally once a day   albuterol 90 mcg/inh inhalation aerosol: 2 puff(s) inhaled every 8 hours as needed for  shortness of breath and/or wheezing  allopurinol 100 mg oral tablet: 1 tab(s) orally once a day  aspirin 81 mg oral tablet: 1 tab(s) orally once a day  atorvastatin 80 mg oral tablet: 1 tab(s) orally once a day  benzonatate 100 mg oral capsule: 1 cap(s) orally 3 times a day as needed for  cough  carvedilol 12.5 mg oral tablet: 1 tab(s) orally every 12 hours  clopidogrel 75 mg oral tablet: 1 tab(s) orally once a day  folic acid 1 mg oral tablet: 1 tab(s) orally once a day  gabapentin 100 mg oral capsule: 1 cap(s) orally 2 times a day  guaiFENesin 600 mg oral tablet, extended release: 1 tab(s) orally every 12 hours  HumaLOG 100 units/mL subcutaneous solution: 10 unit(s) subcutaneous 3 times a day  Senna 8.6 mg oral tablet: 2 tab(s) orally once a day (at bedtime)  sevelamer carbonate 800 mg oral tablet: 1 tab(s) orally 3 times a day  Toujeo SoloStar 300 units/mL subcutaneous solution: 20 unit(s) subcutaneous once a day (at bedtime)  valsartan 40 mg oral tablet: 1 tab(s) orally 2 times a day  Zoloft 25 mg oral tablet: 1 tab(s) orally once a day

## 2023-11-28 NOTE — PROGRESS NOTE ADULT - ASSESSMENT
64yo M, hx of ESRD on HD MWF, CAD s/p stents, CHF, DM, Brought in by EMS for shortness of breath and found to have + RSV    1 Renal -  HD in am if stays.   2 Lytes- Hyponatremia   3 CV-BP is stable   4 Anemia - hgb improving (11/25/23)  5 ID- s/p IV abx     [facility]   [facility]  [phone]

## 2023-12-01 NOTE — CHART NOTE - NSCHARTNOTEFT_GEN_A_CORE
Age: 63y    Gender: Male    POCT Blood Glucose:  218 mg/dL (11-28-23 @ 08:47)  311 mg/dL (11-27-23 @ 20:51)  237 mg/dL (11-27-23 @ 17:50)      eMAR:  atorvastatin   80 milliGRAM(s) Oral (11-27-23 @ 21:24)    insulin glargine Injectable (LANTUS)   18 Unit(s) SubCutaneous (11-27-23 @ 21:26)    insulin lispro (ADMELOG) corrective regimen sliding scale   2 Unit(s) SubCutaneous (11-27-23 @ 21:23)    insulin lispro (ADMELOG) corrective regimen sliding scale   2 Unit(s) SubCutaneous (11-28-23 @ 09:23)   2 Unit(s) SubCutaneous (11-27-23 @ 18:00)    insulin lispro Injectable (ADMELOG)   7 Unit(s) SubCutaneous (11-27-23 @ 18:00)    insulin lispro Injectable (ADMELOG)   9 Unit(s) SubCutaneous (11-28-23 @ 09:24)    63 M with history of ESRD on HD, CAD s/p stent, CHF, DM here for SOB, found to have RSV infection. Endocrinology consulted for diabetes management. BG Goal 100-180mg/dl   fasting and postprandial hyperglycemia in past 24 hours.   Contacted by team for d/c recommendations     # T2DM   - Most recent Hemoglobin A1C 9.5  - Home DM med: toujeo 20 units QHS, humalog 10 unots  - Current FS ranges from 300-400  - Current diet:renal/CHO  - Please monitor blood glucose values TID AC & QHS while eating regular meals and Q6H while NPO  - Blood glucose goals pre-meal less than 140 mg/dL and random blood glucose less than 180 mg/dL  - Recommendations:  - Glucose currently elevated  - FBG above goal, Increase Glargine 20 units QHS  - Post prandial BG above goal requiring 9-11 units, Adjust insulin Lispro 10  units TID with meals, hold if NPO or if eating less than 50% of meals  - Continue with low dose correctional scale TID with meals  - Continue with low dose correctional scale QHS    Discharge planning:   a1c 9.5 likely askew in setting of HD, monitor fructosamine as outpatient   - Home DM medications:  toujeo 20 units QHS, humalog 10 units  - Discharge DM medications:  toujeo 20 units and humalog 10 units TID   - Patient will follow up with Dr. Hernandez  at   [facility]:  [address]. [place] [address]  Tel: [phone]  - Patient will need opthalmology and podiatry follow up as outpatient  - At home, Patient should check FSBG premeals and bedtime. Pt should call their doctor when FSBG <70 or above >400 and or consistently above 200s as changes in the regimen will have to be made.    discussed with Trang GARCIA
Left 1 message for patient in regards to follow up care with callback information.
Pt had a PAT for 4.3 seconds for the first time, HR went up to 145, Pt seen and examined at the bedside in no acute distress, lying in his bed. Denies any chest pain, shortness of breath, palpitations, nausea, vomiting or any other acute complaints at the moment. Vital signs stable as presented below, HR xx.    Vital Signs Last 24 Hrs  T(C): 36.8 (27 Nov 2023 04:32), Max: 36.9 (26 Nov 2023 20:06)  T(F): 98.3 (27 Nov 2023 04:32), Max: 98.4 (26 Nov 2023 20:06)  HR: 78 (27 Nov 2023 04:32) (71 - 78)  BP: 165/81 (27 Nov 2023 04:32) (148/79 - 165/81)  BP(mean): --  RR: 16 (27 Nov 2023 04:32) (16 - 18)  SpO2: 98% (27 Nov 2023 04:32) (94% - 98%)    Parameters below as of 27 Nov 2023 04:32  Patient On (Oxygen Delivery Method): room air        Physical Exam:  General: WN/WD NAD  Neurology: A&Ox3, nonfocal, LÓPEZ x 4  Head:  Normocephalic, atraumatic  Respiratory: CTA B/L  CV: RRR, S1S2, no murmur  Abdominal: Soft, NT, ND no palpable mass  MSK: No edema, + peripheral pulses, FROM all 4 extremity  Labs:            HPI:  Date of Service: 11-24-23 @ 10:44           CARDIOLOGY    H & P  NOTE   ________________________________________________    CHIEF COMPLAINT:Patient is a 63y old  Male who presents with a chief complaint of SOB (24 Nov 2023 09:11)  64yo M, hx of ESRD on HD MWF, CAD s/p stents, CHF, DM, Brought in by EMS for shortness of breath.  Patient reported not feeling well for the past 2 to 3 days with productive cough.  Per EMS febrile, Tmax 103.  On scene, patient was hypoxic, satting around 88 to 92% on room air.  This improved with fall liters nasal cannula.  Patient also has positive vomiting, however denies chest pain or shortness of breath at the moment.  per EMS, family states this is similar to his previous presentation for sepsis or pneumonia. History limited as patient is lethargic.  of note, full run of dialysis is on Monday.      Assessment & Plan:  Patient is a 63y old  Male who presents with a chief complaint of sob, seen for a tele event, now resolved.    >Ekg stat (no acute changes from prior)  >BMP/Mg/phos stat  >Cont tele  >Will endorse the details of the event to the day ACP, and f/u with cathy North PA-C  Department of Medicine

## 2023-12-13 PROCEDURE — 82330 ASSAY OF CALCIUM: CPT

## 2023-12-13 PROCEDURE — 81001 URINALYSIS AUTO W/SCOPE: CPT

## 2023-12-13 PROCEDURE — 85014 HEMATOCRIT: CPT

## 2023-12-13 PROCEDURE — 80048 BASIC METABOLIC PNL TOTAL CA: CPT

## 2023-12-13 PROCEDURE — 84132 ASSAY OF SERUM POTASSIUM: CPT

## 2023-12-13 PROCEDURE — 82962 GLUCOSE BLOOD TEST: CPT

## 2023-12-13 PROCEDURE — 83735 ASSAY OF MAGNESIUM: CPT

## 2023-12-13 PROCEDURE — 85730 THROMBOPLASTIN TIME PARTIAL: CPT

## 2023-12-13 PROCEDURE — 93005 ELECTROCARDIOGRAM TRACING: CPT

## 2023-12-13 PROCEDURE — 84145 PROCALCITONIN (PCT): CPT

## 2023-12-13 PROCEDURE — 85027 COMPLETE CBC AUTOMATED: CPT

## 2023-12-13 PROCEDURE — 84484 ASSAY OF TROPONIN QUANT: CPT

## 2023-12-13 PROCEDURE — 87641 MR-STAPH DNA AMP PROBE: CPT

## 2023-12-13 PROCEDURE — 87637 SARSCOV2&INF A&B&RSV AMP PRB: CPT

## 2023-12-13 PROCEDURE — 85025 COMPLETE CBC W/AUTO DIFF WBC: CPT

## 2023-12-13 PROCEDURE — 83605 ASSAY OF LACTIC ACID: CPT

## 2023-12-13 PROCEDURE — 96374 THER/PROPH/DIAG INJ IV PUSH: CPT

## 2023-12-13 PROCEDURE — 99285 EMERGENCY DEPT VISIT HI MDM: CPT

## 2023-12-13 PROCEDURE — 85610 PROTHROMBIN TIME: CPT

## 2023-12-13 PROCEDURE — 82947 ASSAY GLUCOSE BLOOD QUANT: CPT

## 2023-12-13 PROCEDURE — 36415 COLL VENOUS BLD VENIPUNCTURE: CPT

## 2023-12-13 PROCEDURE — 80053 COMPREHEN METABOLIC PANEL: CPT

## 2023-12-13 PROCEDURE — 94640 AIRWAY INHALATION TREATMENT: CPT

## 2023-12-13 PROCEDURE — 99261: CPT

## 2023-12-13 PROCEDURE — 84295 ASSAY OF SERUM SODIUM: CPT

## 2023-12-13 PROCEDURE — 71046 X-RAY EXAM CHEST 2 VIEWS: CPT

## 2023-12-13 PROCEDURE — 83036 HEMOGLOBIN GLYCOSYLATED A1C: CPT

## 2023-12-13 PROCEDURE — 87340 HEPATITIS B SURFACE AG IA: CPT

## 2023-12-13 PROCEDURE — 82803 BLOOD GASES ANY COMBINATION: CPT

## 2023-12-13 PROCEDURE — 85018 HEMOGLOBIN: CPT

## 2023-12-13 PROCEDURE — 71250 CT THORAX DX C-: CPT

## 2023-12-13 PROCEDURE — 87640 STAPH A DNA AMP PROBE: CPT

## 2023-12-13 PROCEDURE — 82435 ASSAY OF BLOOD CHLORIDE: CPT

## 2023-12-13 PROCEDURE — 84100 ASSAY OF PHOSPHORUS: CPT

## 2023-12-14 ENCOUNTER — APPOINTMENT (OUTPATIENT)
Dept: ENDOCRINOLOGY | Facility: CLINIC | Age: 63
End: 2023-12-14

## 2024-01-01 NOTE — ED ADULT NURSE NOTE - NSICDXPASTMEDICALHX_GEN_ALL_CORE_FT
PAST MEDICAL HISTORY:  ASHD (arteriosclerotic heart disease)     BPH (benign prostatic hyperplasia)     CAD (coronary artery disease)     CKD (chronic kidney disease)     Diabetes Mellitus Type II, Uncontrolled     Diabetic retinopathy     Dyslipidemia     GERD (gastroesophageal reflux disease)     Gout     Hepatitis     HTN (hypertension)     Ischemic cardiomyopathy     Myocardial infarction     Nephrolithiasis     Pulmonary hypertension     s/p Angioplasty with Stent     Vitamin D deficiency     
no

## 2024-02-06 RX ORDER — BLOOD-GLUCOSE METER
W/DEVICE KIT MISCELLANEOUS
Qty: 1 | Refills: 0 | Status: ACTIVE | COMMUNITY
Start: 2024-02-06 | End: 1900-01-01

## 2024-02-06 RX ORDER — ISOPROPYL ALCOHOL 70 ML/100ML
SWAB TOPICAL
Qty: 1 | Refills: 0 | Status: DISCONTINUED | COMMUNITY
Start: 2023-11-14 | End: 2024-02-06

## 2024-02-06 RX ORDER — BLOOD-GLUCOSE METER
KIT MISCELLANEOUS
Qty: 400 | Refills: 3 | Status: ACTIVE | COMMUNITY
Start: 2024-02-06 | End: 1900-01-01

## 2024-02-07 ENCOUNTER — EMERGENCY (EMERGENCY)
Facility: HOSPITAL | Age: 64
LOS: 1 days | Discharge: HOME | End: 2024-02-07
Attending: STUDENT IN AN ORGANIZED HEALTH CARE EDUCATION/TRAINING PROGRAM
Payer: MEDICARE

## 2024-02-07 VITALS
SYSTOLIC BLOOD PRESSURE: 108 MMHG | RESPIRATION RATE: 18 BRPM | TEMPERATURE: 98 F | HEART RATE: 79 BPM | DIASTOLIC BLOOD PRESSURE: 82 MMHG | OXYGEN SATURATION: 98 %

## 2024-02-07 VITALS
HEIGHT: 67 IN | RESPIRATION RATE: 18 BRPM | WEIGHT: 179.9 LBS | DIASTOLIC BLOOD PRESSURE: 61 MMHG | OXYGEN SATURATION: 97 % | TEMPERATURE: 98 F | HEART RATE: 80 BPM | SYSTOLIC BLOOD PRESSURE: 154 MMHG

## 2024-02-07 DIAGNOSIS — Z95.5 PRESENCE OF CORONARY ANGIOPLASTY IMPLANT AND GRAFT: Chronic | ICD-10-CM

## 2024-02-07 DIAGNOSIS — I50.9 HEART FAILURE, UNSPECIFIED: Chronic | ICD-10-CM

## 2024-02-07 DIAGNOSIS — Z98.89 OTHER SPECIFIED POSTPROCEDURAL STATES: Chronic | ICD-10-CM

## 2024-02-07 DIAGNOSIS — E11.319 TYPE 2 DIABETES MELLITUS WITH UNSPECIFIED DIABETIC RETINOPATHY WITHOUT MACULAR EDEMA: Chronic | ICD-10-CM

## 2024-02-07 PROCEDURE — 70450 CT HEAD/BRAIN W/O DYE: CPT | Mod: 26,MA

## 2024-02-07 PROCEDURE — 99284 EMERGENCY DEPT VISIT MOD MDM: CPT

## 2024-02-07 PROCEDURE — 99284 EMERGENCY DEPT VISIT MOD MDM: CPT | Mod: 25

## 2024-02-07 PROCEDURE — 70450 CT HEAD/BRAIN W/O DYE: CPT | Mod: MA

## 2024-02-07 PROCEDURE — 93005 ELECTROCARDIOGRAM TRACING: CPT

## 2024-02-07 PROCEDURE — 99053 MED SERV 10PM-8AM 24 HR FAC: CPT

## 2024-02-07 NOTE — CONSULT NOTE ADULT - SUBJECTIVE AND OBJECTIVE BOX
Patient is a 63y old  Male who presents with a chief complaint of     HPI: 64 y/o male presents after fall prior to HD. PMHX DM, HLD, HTN, gout, gerd, CAD, BPH who presents after fall prior to HD where he hit his head. In the ER he has been stable, CT head was negative for acute process.    HD  -OP provider: Dr. Flowers  -OP Unit: [facility]  -Access: LUE AVF  -Last OP session 2/6/24          PAST MEDICAL & SURGICAL HISTORY:  Diabetes Mellitus Type II, Uncontrolled      Dyslipidemia      s/p Angioplasty with Stent      HTN (hypertension)      Gout      Diabetic retinopathy      GERD (gastroesophageal reflux disease)      Nephrolithiasis      Vitamin D deficiency      Hepatitis      CKD (chronic kidney disease)      Ischemic cardiomyopathy      ASHD (arteriosclerotic heart disease)      Pulmonary hypertension      Myocardial infarction      CAD (coronary artery disease)      BPH (benign prostatic hyperplasia)      Retinal Hemorrhage      S/P coronary artery stent placement  2010 TRICIA to Diag ; 11/18/2010  LAD; 2/4/11 ATOM liberte Diag; 5/15/2017  TRICIA to 1st diag; 6/7/17 PCI with laser and high pressure balloon      History of eye surgery  left eye      H/O lithotripsy      Diabetes with retinopathy  S/P PPV/PRP/GAS  OD 2/22/17 for viterous hemorrhage      CHF with cardiomyopathy  Insertion of Cardiomems monitor      Stented coronary artery          MEDICATIONS  (STANDING):      Allergies    No Known Drug Allergies  shellfish (Unknown)    Intolerances        SOCIAL HISTORY:  Denies ETOh,Smoking,     FAMILY HISTORY:  Family history of cardiac disorder in father (Father)        REVIEW OF SYSTEMS:  CONSTITUTIONAL: No weakness, fevers or chills, +fall  EYES/ENT: No visual changes;  No vertigo or throat pain   NECK: No pain or stiffness  RESPIRATORY: No cough, wheezing, hemoptysis; No shortness of breath  CARDIOVASCULAR: No chest pain or palpitations  GASTROINTESTINAL: No abdominal or epigastric pain. No nausea, vomiting, or hematemesis; No diarrhea or constipation. No melena or hematochezia.  GENITOURINARY: No dysuria, frequency or hematuria  NEUROLOGICAL: No numbness or weakness  SKIN: No itching, burning, rashes, or lesions   All other review of systems is negative unless indicated above.    VITAL:  T(C): , Max: 36.6 (02-07-24 @ 06:08)  T(F): , Max: 97.9 (02-07-24 @ 06:08)  HR: 80 (02-07-24 @ 06:08)  BP: 154/61 (02-07-24 @ 06:08)  BP(mean): --  RR: 18 (02-07-24 @ 06:08)  SpO2: 97% (02-07-24 @ 06:08)  Wt(kg): --    PHYSICAL EXAM:  Constitutional: NAD, Alert  HEENT: NCAT, MMM  Neck: Supple, No JVD  Respiratory: CTA-b/l  Cardiovascular: RRR s1s2, no m/r/g  Gastrointestinal: BS+, soft, NT/ND  Extremities: No peripheral edema b/l. LUE AVF with palpable thrill  Neurological: no focal deficits; strength grossly intact  Back: no CVAT b/l  Skin: No rashes, no nevi    LABS:              IMAGING:    ASSESSMENT:  64 y/o male presents after fall prior to HD. PMHX DM, HLD, HTN, gout, gerd, CAD, BPH who presents after fall prior to HD where he hit his head. In the ER he has been stable, CT head was negative for acute process. Nephrology consulted for HD needs    ESRD on HD MWF  -OP provider: Dr. Flowers  -OP Unit: [facility]  -Access: LUE AVF  -Last OP session 2/6/24    Fall  -CT head negative      RECOMMEND:  -Pt planned for DC from the ED, no renal objection  -pt to f/u at [facility] TODAY 2/7/24 @ 1:30 PM for new chair time for today      Plan of care discussed with ED staff and with patient. Pt showed understanding and will present to [facility] today 2/7/24 for HD needs.     Thank you for involving [facility] in this patient's care.    With warm regards,    Andi Ruiz DO   [facility]  Office: [phone]

## 2024-02-07 NOTE — ED PROVIDER NOTE - ATTENDING CONTRIBUTION TO CARE
Jonnie Justin DO: I have personally performed a face to face medical and diagnostic evaluation of the patient. I have discussed with and reviewed the Resident's and/or ACP's and/or Medical/PA/NP student's note and agree with the History, ROS, Physical Exam and MDM unless otherwise indicated. A brief summary of my personal evaluation and impression can be found below.     64yo M, hx of ESRD on HD MWF, CAD s/p stents, CHF, DM, w/ prior history of subdural hematoma 1 yr presents to the ED after slipping in bathroom @ dialysis and hitting R side of head no loc no ams, acting normally, no preceeding weakness states his foot got caught on carpet.      CONSTITUTIONAL: well-appearing, in NAD  SKIN: Warm dry, normal skin turgor  HEAD: NCAT  EYES: EOMI, PERRLA, no scleral icterus, conjunctiva pink  ENT: normal pharynx with no erythema or exudates  NECK: Supple; non tender. Full ROM.  CARD: RRR, no murmurs.  RESP: clear to ausculation b/l. No crackles or wheezing.  ABD: soft, non-tender, non-distended, no rebound or guarding.  MSK: Full ROM, no bony tenderness, no pedal edema, no calf tenderness  NEURO: normal motor. normal sensory. CN II-XII intact. Cerebellar testing normal. Normal gait.  PSYCH: Cooperative, appropriate.    Given dapt and prior hx of subdural, will get ct head to r/o new ich, pt had no preceding symptoms concerning for infection, pt needs dialysis today, will get ct head and likely dc for outpt dialysis. No concern for facial fracture/trauma at this time.

## 2024-02-07 NOTE — ED ADULT NURSE NOTE - OBJECTIVE STATEMENT
63y male ESRD on dialysis to the ED from home via triage c/o of head injury. As per pt, pt was in the bathroom at dialysis and slipped and fell. Pt reports that pt hit the left side of the head. Pt did not lose consciousness. Pt reports expressing some concern because pt had a hx of subdural hematoma last year and is on aspirin and plavix. A&Ox4. Stretcher in lowest position and locked, appropriate side rails in place, room cleared of clutter and safety hazards, call bell in reach- pt oriented to use, blankets given for comfort

## 2024-02-07 NOTE — ED PROVIDER NOTE - NSFOLLOWUPINSTRUCTIONS_ED_ALL_ED_FT
You were seen in the Emergency Department for a fall. You got a head CT which did not show any acute intracranial pathology.    Follow up with your Primary Doctor within 2-3 days.    Return with any worsening headache, focal weakness, or any other concerning symptoms.

## 2024-02-07 NOTE — ED PROVIDER NOTE - OBJECTIVE STATEMENT
See Attending Attestation See Attending Attestation    64 yo m  hx CAD   s/p  2 drug eluding stents 11/2022 CHF, DM, gout, ESRD on dialysis presents for unwitnessed fall this am. patient was in bathroom and slipped on floor, fell into door frame, denied loc. remembers entire event. was able to ambulate after. on asa/plavix. hx of SDH 1 year ago. did not get dialysis today. denies headache ,dizziness, change in vision, nausea, vomiting.

## 2024-02-07 NOTE — ED PROVIDER NOTE - PROGRESS NOTE DETAILS
Fellow MD Kendell Castillo: Discussed pt's case with [facility] and Fellow MD Kendell Castillo: Pt signed out to me pending CT head, re-evaluation, and disposition. Fellow MD Kendell Castillo: Discussed pt's case with [facility] and they can see him today at 1330. Pt is otherwise mentanting well and in no acute distress. Pt is stable for discharge.

## 2024-02-07 NOTE — ED PROVIDER NOTE - PATIENT PORTAL LINK FT
You can access the FollowMyHealth Patient Portal offered by [facility] by registering at the following website: [email or web address]. By joining [facility]’s FollowMyHealth portal, you will also be able to view your health information using other applications (apps) compatible with our system.

## 2024-02-07 NOTE — ED ADULT NURSE REASSESSMENT NOTE - NS ED NURSE REASSESS COMMENT FT1
Received report from Alison Roman . pt Breathing spontaneous and unlabored on room air, Skin warm and dry and of color appropriate for ethnicity , moves all extremities, speech clear. pending DC

## 2024-02-07 NOTE — ED ADULT NURSE NOTE - NSFALLRISKINTERV_ED_ALL_ED

## 2024-02-07 NOTE — ED ADULT NURSE NOTE - CAS ELECT INFOMATION PROVIDED
MD at bedside examing the pt.  Pt. had opportunities to ask questions regarding 
recommended procedure.  All concerns addressed by EDP. DC instructions

## 2024-02-27 RX ORDER — BLOOD-GLUCOSE METER
70 EACH MISCELLANEOUS
Qty: 4 | Refills: 3 | Status: ACTIVE | COMMUNITY
Start: 2024-02-06 | End: 1900-01-01

## 2024-02-27 RX ORDER — BLOOD SUGAR DIAGNOSTIC
STRIP MISCELLANEOUS 3 TIMES DAILY
Qty: 3 | Refills: 3 | Status: ACTIVE | COMMUNITY
Start: 2024-02-27 | End: 1900-01-01

## 2024-03-07 ENCOUNTER — APPOINTMENT (OUTPATIENT)
Dept: WOUND CARE | Facility: HOSPITAL | Age: 64
End: 2024-03-07

## 2024-03-14 NOTE — ED ADULT NURSE NOTE - NS ED NURSE DISCH DISPOSITION
Freeman Velazquez's chief complaint for this visit includes:  Chief Complaint   Patient presents with    Skin Check     Patient would like a full skin check and is concerned about a spot on head and anus and face.      PCP: No Ref-Primary, Physician    Referring Provider:  Provider Not In System       There were no vitals taken for this visit.  No Pain (0)        Allergies   Allergen Reactions    Tetracycline Shortness Of Breath    Erythromycin          Do you need any medication refills at today's visit? No    Rolanda Aguirre MA        
Discharged

## 2024-04-03 NOTE — ED CDU PROVIDER INITIAL DAY NOTE - CROS ED NEURO ALL NEG
Inpatient consult to Cardiology  Consult performed by: Jermaine Paula DO  Consult ordered by: SADIE Zeng-CNP  Reason for consult: Syncope        History Of Present Illness:    Migdalia Sam is a 51 y.o. female history of recently diagnosed asymmetric septal hypertrophy, hypertension, aortic insufficiency presenting with syncope..  Patient states that yesterday she was both dizzy and lightheaded and had several falls.  Last fall associated with loss of consciousness.  She reports her legs been very weak.  She was admitted to [facility] in February 2024 with nausea and vomiting and mildly elevated troponin-she was found to have hyperdynamic left ventricular systolic function with a severe left outflow tract gradient and mild asymmetric hypertrophy     Last Recorded Vitals:  Vitals:    04/03/24 0700 04/03/24 0800 04/03/24 0845 04/03/24 1015   BP: 97/61 108/58 99/56 104/59   Pulse: 94 97 96 96   Resp: 16      Temp:       SpO2: 96% 100% 96% 99%   Weight:       Height:           Last Labs:  CBC - 4/3/2024:  5:01 AM  15.2 9.1 198    27.8      CMP - 4/3/2024:  5:01 AM  7.1 6.4 210 --- 0.8   4.3 3.3 56 150      PTT - 1/2/2024:  4:43 AM  1.1   11.9 32     Troponin I, High Sensitivity   Date/Time Value Ref Range Status   04/03/2024 05:01 AM 65 (HH) 0 - 13 ng/L Final     Comment:     Previous result verified on 4/2/2024 2021 on specimen/case [id number] called with component TRP for procedure Troponin I, High Sensitivity with value 83 ng/L.   04/02/2024 09:54 PM 56 (HH) 0 - 13 ng/L Final     Comment:     Previous result verified on 4/2/2024 2021 on specimen/case [id number] called with component TRPHS for procedure Troponin I, High Sensitivity with value 83 ng/L.   04/02/2024 07:45 PM 83 (HH) 0 - 13 ng/L Final     BNP   Date/Time Value Ref Range Status   04/02/2024 07:45  (H) 0 - 99 pg/mL Final   03/18/2024 06:31 AM 85 0 - 99 pg/mL Final     Hemoglobin A1C   Date/Time Value Ref Range  Monitoring of Diabetes                Age (y)     Therapeutic Goal (%)   Adults:          >18           <7.0   Pediatrics:    13-18           <7.5                   7-12           <8.0                   0- 6            7.5-8.5   American Diabetes Association. Diabetes Care 33(S1), Jan 2010.     08/26/2021 08:15 AM 5.7 (A) % Final     Comment:          Diagnosis of Diabetes-Adults   Non-Diabetic: < or = 5.6%   Increased risk for developing diabetes: 5.7-6.4%   Diagnostic of diabetes: > or = 6.5%  .       Monitoring of Diabetes                Age (y)     Therapeutic Goal (%)   Adults:          >18           <7.0   Pediatrics:    13-18           <7.5                   7-12           <8.0                   0- 6            7.5-8.5   American Diabetes Association. Diabetes Care 33(S1), Jan 2010.       LDL Calculated   Date/Time Value Ref Range Status   01/02/2024 04:43 AM 62 <=99 mg/dL Final     Comment:                                 Near   Borderline      AGE      Desirable  Optimal    High     High     Very High     0-19 Y     0 - 109     ---    110-129   >/= 130     ----    20-24 Y     0 - 119     ---    120-159   >/= 160     ----      >24 Y     0 -  99   100-129  130-159   160-189     >/=190     12/14/2023 01:24 PM 71 <=99 mg/dL Final     Comment:                                 Near   Borderline      AGE      Desirable  Optimal    High     High     Very High     0-19 Y     0 - 109     ---    110-129   >/= 130     ----    20-24 Y     0 - 119     ---    120-159   >/= 160     ----      >24 Y     0 -  99   100-129  130-159   160-189     >/=190       VLDL   Date/Time Value Ref Range Status   01/02/2024 04:43 AM 53 (mL/kg)   Weight: 79.8 kg     Past Cardiology Tests (Last 3 Years):  EKG:  ECG 12 lead 04/02/2024 (Preliminary)  Normal sinus rhythm, left ventricular hypertrophy    ECG 12 Lead 03/17/2024      ECG 12 lead 03/04/2024      ECG 12 lead 01/14/2024      ECG 12 lead 01/01/2024    Echo:  Transthoracic Echo (TTE) Complete to/2024  Hyperdynamic left ventricular systolic function with asymmetric septal hypertrophy and severe left outflow tract gradient with Valsalva    Ejection Fractions:  EF   Date/Time Value Ref Range Status   12/11/2023 10:53 AM 64       Cath:  No results found for this or any previous visit from the past 1095 days.    Stress Test:  Nuclear Stress Test 01/03/2024  Normal perfusion    Cardiac Imaging:  No results found for this or any previous visit from the past 1095 days.      Past Medical History:  She has a past medical history of Acute bronchitis, unspecified (08/07/2018), Acute insomnia (07/26/2023), Acute pharyngitis, unspecified (08/07/2018), Anxiety disorder, unspecified (01/16/2018), Anxiety disorder, unspecified (01/16/2018), Anxiety disorder, unspecified (01/16/2018), Anxiety disorder, unspecified (08/07/2018), Bacterial skin infection (07/26/2023), Body mass index (BMI) 45.0-49.9, adult (CMS/HCC) (12/02/2021), CAP (community acquired pneumonia) (06/26/2023), Cervicalgia (01/16/2018), Cervicalgia (01/16/2018), Cervicalgia (01/16/2018), Cervicalgia (08/07/2018), Disorder of bone, unspecified (01/16/2018), Disorder of bone, unspecified (01/16/2018), Disorder of bone, unspecified (01/16/2018), Disorder of bone, unspecified (08/07/2018), Disorder of thyroid, unspecified, Epilepsy, unspecified, not intractable, without status epilepticus (CMS/[facility])  (01/16/2018), Epilepsy, unspecified, not intractable, without status epilepticus (CMS/HCC) (01/16/2018), Epilepsy, unspecified, not intractable, without status epilepticus (CMS/HCC) (01/16/2018), Epilepsy, unspecified, not intractable, without status epilepticus (CMS/HCC) (08/07/2018), Fibrous dysplasia (monostotic), unspecified site (01/16/2018), Fibrous dysplasia (monostotic), unspecified site (01/16/2018), Fibrous dysplasia (monostotic), unspecified site (01/16/2018), Fibrous dysplasia (monostotic), unspecified site (08/07/2018), Frequency of micturition (08/07/2018), Generalized anxiety disorder (08/07/2018), Major depressive disorder, recurrent, moderate (CMS/HCC) (01/16/2018), Major depressive disorder, recurrent, moderate (CMS/HCC) (01/16/2018), Major depressive disorder, recurrent, moderate (CMS/HCC) (01/16/2018), Major depressive disorder, recurrent, moderate (CMS/HCC) (08/07/2018), Major depressive disorder, recurrent, moderate (CMS/HCC) (07/24/2018), Migraine without aura, not intractable, without status migrainosus (01/16/2018), Migraine without aura, not intractable, without status migrainosus (01/16/2018), Migraine without aura, not intractable, without status migrainosus (01/16/2018), Migraine without aura, not intractable, without status migrainosus (08/07/2018), Morbid (severe) obesity due to excess calories (CMS/HCC) (12/02/2021), Morbid (severe) obesity due to excess calories (CMS/HCC) (08/12/2020), Nasal congestion (08/07/2018), Other specified anxiety disorders (11/06/2020), Pain in left knee (08/07/2018), Pain in thoracic spine (11/01/2017), Personal history of other diseases of the circulatory system (11/01/2017), Personal history of other diseases of the digestive system (06/07/2019), Personal history of other diseases of the musculoskeletal system and connective tissue, mental and behavioral disorders (11/01/2017), Personal history of other mental and behavioral disorders, Personal history of other mental and behavioral disorders (05/14/2018), Personal history of other specified conditions (11/01/2017), Personal history of other specified conditions, Personal history of other specified conditions (05/13/2019), Personal history of other specified conditions (04/09/2021), Personal history of other specified conditions, Pneumonia (06/07/2023), Rheumatoid arthritis flare (CMS/HCC) (06/07/2023), Stress incontinence (female) (male) (08/07/2018), Tinnitus, left ear (08/07/2018), Unspecified lump in the left breast, lower inner quadrant (08/07/2018), Unspecified lump in the left breast, unspecified quadrant (08/07/2018), Unspecified temporomandibular joint disorder, unspecified side (08/07/2018), and Unspecified visual disturbance (08/07/2018).    Past Surgical History:  She has a past surgical history that includes Other surgical history (09/19/2022); Bladder surgery (03/12/2020); and biopsy (Left, 12/04/2023).      Social History:  She reports that she has been smoking cigarettes. She has been smoking an average of .5 packs per day. She has never used smokeless tobacco. She reports that she does not  Father      OCD Brother      [place]'s disease Brother      Seizures Mother's Sister      Schizophrenia Maternal Grandmother      Alcohol abuse Other          Grandmother        Allergies:  Codeine, Cefdinir, Cephalexin, Sulfa (sulfonamide antibiotics), Sulfamethoxazole-trimethoprim, Butalbital-acetaminophen, Desvenlafaxine, Hydrochlorothiazide, Mirtazapine, and Venlafaxine    Inpatient Medications:  Scheduled medications   Medication Dose Route Frequency    ciprofloxacin  400 mg intravenous q12h    heparin (porcine)  5,000 Units subcutaneous q8h    metroNIDAZOLE  500 mg intravenous q8h    pantoprazole  40 mg intravenous BID    potassium chloride  20 mEq intravenous q2h    potassium chloride  40 mEq intravenous Once    potassium chloride CR  40 mEq oral Once     PRN medications   Medication    ondansetron     Continuous Medications   Medication Dose Last Rate     Outpatient Medications:  Current Outpatient Medications   Medication Instructions    albuterol 90 mcg/actuation inhaler 2 puffs, inhalation, Every 4 hours PRN    albuterol 2.5 mg, nebulization, Every 4 hours PRN    amLODIPine (NORVASC) 5 mg, oral, 2 times daily    aspirin 81 mg, oral, Daily    atorvastatin (LIPITOR) 80 mg, oral, Nightly    azelastine (Astelin) 137 mcg (0.1 %) nasal spray 2 sprays, Each Nostril, 2 times daily PRN    buPROPion XL (Wellbutrin XL) 150 mg 24 hr tablet 1 tablet, oral, Daily    cholecalciferol (VITAMIN D-3) 5,000 Units, oral, 3 times weekly    clonazePAM (KlonoPIN) 0.5 mg tablet 1 tablet, oral, Daily PRN    cloNIDine (Catapres-TTS) 0.1 mg/24 hr patch 1 patch, transdermal, Weekly    cyanocobalamin (VITAMIN B-12) 1,000 mcg, oral, Daily    fluticasone propion-salmeteroL (Advair  Diskus) 500-50 mcg/dose diskus inhaler 1 puff, inhalation, 2 times daily RT, RINSE AND SPIT AFTER USE    furosemide (LASIX) 40 mg, oral, Daily    gabapentin (NEURONTIN) 600 mg, oral, 3 times daily    lamoTRIgine (LaMICtal) 150 mg tablet 1 tablet, oral, Daily    leflunomide (ARAVA) 20 mg, oral, Daily    levothyroxine (SYNTHROID, LEVOXYL) 175 mcg, oral, Daily before breakfast, Exclude Sunday    lisinopril 10 mg, oral, Daily    meloxicam (MOBIC) 15 mg, oral, Daily    methocarbamol (ROBAXIN) 500 mg, oral, 3 times daily    metoprolol succinate XL (TOPROL-XL) 50 mg, oral, Daily    milnacipran (Savella) 50 MG tablet 1 tablet, oral, 3 times daily    montelukast (Singulair) 10 mg tablet 1 tablet, oral, Nightly    nicotine (Nicoderm CQ) 14 mg/24 hr patch Place 1 new patch on the skin once daily; leave in place for a full 24 hours prior to removing and placing next patch.    oxygen (O2) 2 L/min, inhalation, Continuous PRN    pantoprazole (PROTONIX) 40 mg, oral, 2 times daily, Do not crush, chew, or split.    polyethylene glycol (Glycolax, Miralax) 17 gram/dose powder Take 17 g by mouth once daily as needed.    sertraline (ZOLOFT) 100 mg, oral, Daily    sodium bicarbonate 325 mg, oral, 3 times daily    topiramate (TOPAMAX) 100 mg, oral, 2 times daily       Physical Exam:  PHYSICAL EXAM:    Constitutional/General: Lethargic but arousable, overweight,nontoxic, and in NAD  Neck:  No increased JVP,no carotid bruits.  Respiratory:  Lungs clear to auscultation bilaterally, no wheezes, rales, or rhonchi, not in respiratory distress  Cardiovascular:  Regular rate, regular rhythm, no  gallops, or rubs, PMI nondisplaced.  3/6 mid peaking systolic ejection murmur heard across the precordium 2+ distal pulses  Chest:  No chest wall tenderness  GI:  Abdomen soft, nontender, nondistended, + BS, no organomegaly, no palpable masses, no rebound, guarding, or rigidity  Musculoskeletal:  Moves all extremities x4  Extremities: No peripheral  edema  Integument: Skin warm and dry, no rashes  Neurologic:  No focal deficits  Psychiatric: Flat affect    Vital Signs, Nursing Notes, Allergies, and Medications reviewed by me.     Assessment/Plan   1.  Syncope  2.  Hypotension  3.  Asymmetric septal hypertrophy  4.  Anemia  5.  Elevated troponin  -Migdalia Sam is a 51 y.o. female history of recently diagnosed asymmetric septal hypertrophy, hypertension, aortic insufficiency presenting with syncope.  -She was admitted to [facility] in February 2024 with nausea and vomiting and mildly elevated troponin-she was found to have hyperdynamic left ventricular systolic function with a severe left outflow tract gradient and mild asymmetric hypertrophy.  -Suspect the syncope is related to hypotension related to multiple antihypertensive medications, anemia, left ventricular outflow tract obstruction.  -Continue to hold antihypertensive medications and hydrate with IV fluids.  Ideally at some point would like to resume her beta-blocker as tolerated because of the outflow tract obstruction (has a prominent murmur on exam)  -When clinically stable would suggest a cardiac MRI to further evaluate her asymmetric septal hypertrophy/left outflow tract obstruction.  -Elevated troponin likely type II MI in the setting of hypotension and anemia.  -Will continue to monitor with you      Peripheral IV 04/02/24 20 G Left;Anterior Forearm (Active)   Site Assessment Clean;Dry;Intact 04/02/24 2100   Dressing Type Transparent 04/02/24 2100   Line Status Flushed;Blood return noted 04/02/24 2100   Number of days: 1       Code Status:  Full Code            Jermaine Paula DO   negative...

## 2024-04-09 ENCOUNTER — APPOINTMENT (OUTPATIENT)
Dept: CT IMAGING | Facility: CLINIC | Age: 64
End: 2024-04-09
Payer: MEDICARE

## 2024-04-09 ENCOUNTER — OUTPATIENT (OUTPATIENT)
Dept: OUTPATIENT SERVICES | Facility: HOSPITAL | Age: 64
LOS: 1 days | End: 2024-04-09
Payer: MEDICARE

## 2024-04-09 DIAGNOSIS — E11.319 TYPE 2 DIABETES MELLITUS WITH UNSPECIFIED DIABETIC RETINOPATHY WITHOUT MACULAR EDEMA: Chronic | ICD-10-CM

## 2024-04-09 DIAGNOSIS — Z98.89 OTHER SPECIFIED POSTPROCEDURAL STATES: Chronic | ICD-10-CM

## 2024-04-09 DIAGNOSIS — Z00.8 ENCOUNTER FOR OTHER GENERAL EXAMINATION: ICD-10-CM

## 2024-04-09 DIAGNOSIS — R10.33 PERIUMBILICAL PAIN: ICD-10-CM

## 2024-04-09 DIAGNOSIS — I50.9 HEART FAILURE, UNSPECIFIED: Chronic | ICD-10-CM

## 2024-04-09 DIAGNOSIS — Z95.5 PRESENCE OF CORONARY ANGIOPLASTY IMPLANT AND GRAFT: Chronic | ICD-10-CM

## 2024-04-09 PROCEDURE — 74176 CT ABD & PELVIS W/O CONTRAST: CPT

## 2024-04-09 PROCEDURE — 74176 CT ABD & PELVIS W/O CONTRAST: CPT | Mod: 26

## 2024-04-12 RX ORDER — PEN NEEDLE, DIABETIC 29 G X1/2"
32G X 4 MM NEEDLE, DISPOSABLE MISCELLANEOUS
Qty: 2 | Refills: 0 | Status: ACTIVE | COMMUNITY
Start: 2018-06-14 | End: 1900-01-01

## 2024-05-09 ENCOUNTER — APPOINTMENT (OUTPATIENT)
Dept: TRANSPLANT | Facility: CLINIC | Age: 64
End: 2024-05-09
Payer: COMMERCIAL

## 2024-05-09 ENCOUNTER — APPOINTMENT (OUTPATIENT)
Dept: NEPHROLOGY | Facility: CLINIC | Age: 64
End: 2024-05-09

## 2024-05-09 VITALS
OXYGEN SATURATION: 94 % | BODY MASS INDEX: 29.03 KG/M2 | WEIGHT: 185 LBS | HEART RATE: 86 BPM | HEIGHT: 67 IN | RESPIRATION RATE: 16 BRPM | DIASTOLIC BLOOD PRESSURE: 88 MMHG | SYSTOLIC BLOOD PRESSURE: 168 MMHG | TEMPERATURE: 97.5 F

## 2024-05-09 DIAGNOSIS — Z01.818 ENCOUNTER FOR OTHER PREPROCEDURAL EXAMINATION: ICD-10-CM

## 2024-05-09 PROCEDURE — 99214 OFFICE O/P EST MOD 30 MIN: CPT

## 2024-05-09 NOTE — ASSESSMENT
[Not a candidate] : not a candidate. We should not proceed with our protocol for evaluation for kidney transplantation. [FreeTextEntry1] : 63yo m, esrd on ihd for kidney transplant eval vasculopath ct abdo from april 2024 reviewed - leadpipe calcification of bilateral ext iliacs,  has missed some dialysis sessions poor diabetic control A1C>10  unfortunately, this patient is a poor candidate for kidney transplant  pt and wife informed that we would not proceed further with kidney transplantation evaluation advised that he can seek a second opinion at another transplant center  Pt and wife understand and had the opportunity to ask a number of questions which I answered

## 2024-05-09 NOTE — PHYSICAL EXAM
[Soft] : soft [Non-tender] : non-tender [] : right dorsalis pedis palpable [TextBox_25] : centrally obese, reducible umbilical hernia [TextBox_34] : healed left heel ulcer

## 2024-05-09 NOTE — HISTORY OF PRESENT ILLNESS
[TextBox_42] : Patient is a 64-year-old man with ESRD, IHD since 2022 MWF via left arm fistula normal amounts of urine bph has some obstructive symptoms - nocturia x3, no dribbling, difficulties initiating stream, urinates 3x/d admitted Nov 2023 with resp failure 2/2 missed HD and RSV, possible RLL pneumonia has tender umbilical hernia   PMHx history of type 2 diabetes - neuropathy, retinopathy, foot ulcer now healed  on insulin A1c >10 hypertension, CAD - post cardiac stents multiple - [facility], [facility], [facility]; on plavix and aspirin, last seen by cardiologist Oct 2023  hyperlipidemia  Past surgery carpal tunnel laser eye retinal heel ulcer slow healing over 8 months left leg angioplasty - known to vascular surgeon  can walk 2 blocks with walking frame (stopping intermittently) no claudication symptoms  SHx lives with wife and daughter and 2 grandkids has 3 children  never smoked no etoh not working, retired  for [employer] plays [employer]

## 2024-05-14 ENCOUNTER — APPOINTMENT (OUTPATIENT)
Dept: ENDOCRINOLOGY | Facility: CLINIC | Age: 64
End: 2024-05-14

## 2024-05-14 ENCOUNTER — APPOINTMENT (OUTPATIENT)
Dept: ENDOCRINOLOGY | Facility: CLINIC | Age: 64
End: 2024-05-14
Payer: MEDICARE

## 2024-05-14 VITALS
WEIGHT: 190 LBS | HEIGHT: 67 IN | OXYGEN SATURATION: 96 % | DIASTOLIC BLOOD PRESSURE: 82 MMHG | SYSTOLIC BLOOD PRESSURE: 120 MMHG | BODY MASS INDEX: 29.82 KG/M2 | HEART RATE: 85 BPM

## 2024-05-14 DIAGNOSIS — I10 ESSENTIAL (PRIMARY) HYPERTENSION: ICD-10-CM

## 2024-05-14 DIAGNOSIS — E11.9 TYPE 2 DIABETES MELLITUS W/OUT COMPLICATIONS: ICD-10-CM

## 2024-05-14 DIAGNOSIS — R79.89 OTHER SPECIFIED ABNORMAL FINDINGS OF BLOOD CHEMISTRY: ICD-10-CM

## 2024-05-14 DIAGNOSIS — E78.00 PURE HYPERCHOLESTEROLEMIA, UNSPECIFIED: ICD-10-CM

## 2024-05-14 LAB — HBA1C MFR BLD HPLC: 11.5

## 2024-05-14 PROCEDURE — 83036 HEMOGLOBIN GLYCOSYLATED A1C: CPT | Mod: QW

## 2024-05-14 PROCEDURE — G2211 COMPLEX E/M VISIT ADD ON: CPT

## 2024-05-14 PROCEDURE — 95251 CONT GLUC MNTR ANALYSIS I&R: CPT

## 2024-05-14 PROCEDURE — 99214 OFFICE O/P EST MOD 30 MIN: CPT

## 2024-05-14 RX ORDER — DULAGLUTIDE 0.75 MG/.5ML
0.75 INJECTION, SOLUTION SUBCUTANEOUS
Qty: 6 | Refills: 0 | Status: ACTIVE | COMMUNITY
Start: 2024-05-14 | End: 1900-01-01

## 2024-05-14 RX ORDER — INSULIN LISPRO 100 [IU]/ML
100 INJECTION, SOLUTION INTRAVENOUS; SUBCUTANEOUS
Qty: 141 | Refills: 1 | Status: ACTIVE | COMMUNITY
Start: 2018-06-15 | End: 1900-01-01

## 2024-05-14 RX ORDER — INSULIN GLARGINE 300 U/ML
300 INJECTION, SOLUTION SUBCUTANEOUS
Qty: 3 | Refills: 0 | Status: ACTIVE | COMMUNITY
Start: 2024-04-12 | End: 1900-01-01

## 2024-05-14 NOTE — ASSESSMENT
[Diabetes Foot Care] : diabetes foot care [Carbohydrate Consistent Diet] : carbohydrate consistent diet [Importance of Diet and Exercise] : importance of diet and exercise to improve glycemic control, achieve weight loss and improve cardiovascular health [Exercise/Effect on Glucose] : exercise/effect on glucose [Retinopathy Screening] : Patient was referred to ophthalmology for retinopathy screening [FreeTextEntry1] : Mr. ADAM KOWALSKI is a 64 year year old M here for evaluation and treatment of diabetes mellitus type II, poorly controlled with multiple microvascular and macrovascular complication.  1. Type 2 diabetes mellitus Point-of-care 7.2% on March 29, 2023 A1c 5/14/24: 11.5%, markedly elevated  Appetite has increased significantly since last visit, and patient has gained a lot of weight. He reports missed insulin doses 2x a week.  Glucose Monitoring: A1c is markedly elevated today 5/14/24, [place] is downloaded with very limited data demonstrating persistent hyperglycemia in the 300s and 400s, unable to assess glycemic control as the data is cut off.   BARRIERS IDENTIFIED: COST: Filled out patient assistance program for the patient to get Toujeo and Admelog. KNOWLEDGE DEFICIT: Provided education and teach back on Trulicity injection. ' I expressed my concern for longstanding uncontrolled type 2 diabetes including micro and macrovascular complications, especially in the setting of potential renal transplant and umbilical hernia surgery.  Patient is at risk for poor wound healing.  PLAN: Recommend to increase Toujeo to 24 units at bedtime Recommend to increase Humalog to 12 units 3 times daily with meals plus 2:50>150.  Recommend to START Trulicity 0.75 mg once weekly.  I counseled that patient that if severe abdominal pain and nausea and vomiting occurs, the pt should stop GLP and go to the ER. We also discussed the more mild side effect of abdominal discomfort/nausea with GLP  initiation, which should improve over time. The patient was instructed to let me know if the side effects are intolerable/do not improve.  Would like start patient on a GLP agonist. We discussed the pros and cons of this class of medications, including the benefits of glucose lowering,weight loss, cardiovascular and renal benefits and the warnings for pancreatitis, medullary thyroid cancer, and acute kidney injury. Patient is agreeable with starting a medication of this class.  Glucose significantly elevated postprandially due to dietary indiscretion Patient's glucose control was much better while he was hospitalized with a diet controlled.  Continue to follow-up with ophthalmologist once annually. Ophthalmologist is aware that patient is currently on GLP-1 receptor agonist.  Attending wound care for leg wounds for podiatry care.  Discussed risks of the thyroid C-cell tumor, pancreatitis, hypoglycemia, hypersensitivity reactions, acute kidney injury, severe gastrointestinal complications, discussed that the most common adverse infections included nausea, diarrhea, vomiting, and abdominal pain. We discussed that gastric emptying slow by GLP-1 receptor agonist.  Diabetic retinopathy complications have been reported in a cardiovascular outcomes trial. Should have semi-annual eye exam and close follow up with his/her ophthalmologist.  He is getting foot care every few days.   2. Hypertension Blood pressure goal <130/80 Currently at goal at 120/82 mmHg. Following up with nephrology, patient is on hemodialysis.  3. Hyperlipidemia Continue with fenofibrate renally dosed. Continue with atorvastatin 80 mg once daily. Discussed diet and exercise  4. Depression On Sertraline. Refusing to see psych at this time.  Unclear whether the patient has had labs, as there are no labs in the patients chart since 2022 although the patient is following with transplant surgery.  I confirmed with the patient's wife at today's visit that he has had multiple full panels of labs and he will fax me results.

## 2024-05-14 NOTE — PHYSICAL EXAM
[Alert] : alert [Well Nourished] : well nourished [No Acute Distress] : no acute distress [No Respiratory Distress] : no respiratory distress [No Accessory Muscle Use] : no accessory muscle use [Clear to Auscultation] : lungs were clear to auscultation bilaterally [Normal PMI] : the apical impulse was normal [Normal S1, S2] : normal S1 and S2 [Normal Rate] : heart rate was normal

## 2024-05-14 NOTE — HISTORY OF PRESENT ILLNESS
[FreeTextEntry1] : Patient is a 64-year-old man with history of type 2 diabetes, hypertension, CKD, CAD status post cardiac stents, hypertension, hyperlipidemia, here for endocrinology follow-up visit.  Previously hospitalized for accidental swallowed denture.  Additionally, he had another laser tx for retinopathy as his vision was worsening.  INTERVAL CHANGES: Recent renal transplant evaluation.  A1c is markedly elevated today 5/14/24, Homer is downloaded with very limited data demonstrating persistent hyperglycemia in the 300s and 400s, unable to assess glycemic control as the data is cut off.  Patient will be getting umbilical hernia repair.  Regarding type 2 diabetes, diagnosed more than 20+ years, complicated by diabetic retinopathy, neuropathy, and nephropathy, now pending AV fistula placement and potential dialysis.  PMHx History of type 2 diabetes - neuropathy, retinopathy, foot ulcer now healed On insulin A1c >10 Hypertension, CAD - post cardiac stents [facility], [facility], [facility]; on plavix and aspirin, last seen by cardiologist Oct 2023 Hyperlipidemia  Surgical History: Carpal tunnel Laser eye retinal Heel ulcer slow healing over 8 months Left leg angioplasty - known to vascular surgeon  Diabetes also complicated by CAD status post cardiac stents. He has a CardioMEMS. No history of CHF or CVA in the past. He follows up with Dr. Jonathan Torres from [facility].  A1c 5/14/24: 11.5%, markedly uncontrolled.   CURRENT REGIMEN: Toujeo 20 units at bedtime Humalog 10 units 3 times daily with meals with low-dose sliding scale 1:50.  Trulicity 0.75 mg once weekly  -- NOT TAKING.   Glucose Monitoring: A1c is markedly elevated today 5/14/24, Homer is downloaded with very limited data demonstrating persistent hyperglycemia in the 300s and 400s, unable to assess glycemic control as the data is cut off.   He is here with his wife today, who is a dialysis technician. Both patient and wife endorsed poor dietary habits. Patient lost his job last year, has been sitting at home and not doing much since then. Usually eats Chinese takeout for lunch and dinner. Patient endorsed that he likes carbohydrate and likes rice and noodles. He denies engaging in any exercise, watching TV for most of the day.  Regarding hypertension, currently managed by his new nephrologist. He is pending AV fistula creation with vascular doctor in the next few weeks. He may be heading towards hemodialysis.  Regarding hyperlipidemia patient is on statin therapy with atorvastatin 80 mg once daily as well as fenofibrate 48 mg once daily. [FreeTextEntry4] : 0

## 2024-05-14 NOTE — REVIEW OF SYSTEMS
[Fatigue] : fatigue [Recent Weight Gain (___ Lbs)] : recent weight gain: [unfilled] lbs [Negative] : Heme/Lymph [Decreased Appetite] : appetite not decreased

## 2024-05-15 ENCOUNTER — APPOINTMENT (OUTPATIENT)
Dept: SURGERY | Facility: CLINIC | Age: 64
End: 2024-05-15
Payer: MEDICARE

## 2024-05-15 VITALS
RESPIRATION RATE: 16 BRPM | HEIGHT: 67 IN | SYSTOLIC BLOOD PRESSURE: 127 MMHG | HEART RATE: 89 BPM | WEIGHT: 190 LBS | BODY MASS INDEX: 29.82 KG/M2 | OXYGEN SATURATION: 96 % | TEMPERATURE: 98.6 F | DIASTOLIC BLOOD PRESSURE: 66 MMHG

## 2024-05-15 DIAGNOSIS — K42.9 UMBILICAL HERNIA W/OUT OBSTRUCTION OR GANGRENE: ICD-10-CM

## 2024-05-15 PROCEDURE — 99203 OFFICE O/P NEW LOW 30 MIN: CPT

## 2024-05-15 RX ORDER — CLOPIDOGREL BISULFATE 75 MG/1
75 TABLET, FILM COATED ORAL
Refills: 0 | Status: ACTIVE | COMMUNITY

## 2024-05-15 RX ORDER — FLASH GLUCOSE SENSOR
KIT MISCELLANEOUS
Qty: 6 | Refills: 0 | Status: DISCONTINUED | COMMUNITY
Start: 2019-03-11 | End: 2024-05-15

## 2024-05-15 RX ORDER — TICAGRELOR 90 MG/1
90 TABLET ORAL
Qty: 60 | Refills: 0 | Status: DISCONTINUED | COMMUNITY
Start: 2017-05-17 | End: 2024-05-15

## 2024-05-15 RX ORDER — COLLAGENASE SANTYL 250 [ARB'U]/G
250 OINTMENT TOPICAL DAILY
Qty: 1 | Refills: 1 | Status: DISCONTINUED | COMMUNITY
Start: 2023-03-08 | End: 2024-05-15

## 2024-05-15 RX ORDER — SYRINGE-NEEDLE,INSULIN,0.5 ML 30 G X1/2"
30G X 1/2" SYRINGE, EMPTY DISPOSABLE MISCELLANEOUS
Qty: 100 | Refills: 5 | Status: DISCONTINUED | COMMUNITY
Start: 2019-03-11 | End: 2024-05-15

## 2024-05-15 RX ORDER — FAMOTIDINE 40 MG/1
40 TABLET, FILM COATED ORAL
Refills: 0 | Status: ACTIVE | COMMUNITY

## 2024-05-15 RX ORDER — HYDRALAZINE HYDROCHLORIDE 25 MG/1
25 TABLET ORAL
Qty: 90 | Refills: 5 | Status: DISCONTINUED | COMMUNITY
Start: 2017-05-17 | End: 2024-05-15

## 2024-05-15 RX ORDER — LANCETS 28 GAUGE
EACH MISCELLANEOUS
Qty: 3 | Refills: 3 | Status: DISCONTINUED | COMMUNITY
Start: 2024-02-27 | End: 2024-05-15

## 2024-05-15 RX ORDER — BLOOD-GLUCOSE CONTROL, LOW
EACH MISCELLANEOUS
Qty: 1 | Refills: 0 | Status: DISCONTINUED | COMMUNITY
Start: 2024-02-27 | End: 2024-05-15

## 2024-05-15 RX ORDER — TORSEMIDE 20 MG/1
20 TABLET ORAL
Qty: 180 | Refills: 3 | Status: DISCONTINUED | COMMUNITY
Start: 2019-09-17 | End: 2024-05-15

## 2024-05-15 RX ORDER — BLOOD SUGAR DIAGNOSTIC
STRIP MISCELLANEOUS
Qty: 4 | Refills: 3 | Status: DISCONTINUED | COMMUNITY
Start: 2018-05-15 | End: 2024-05-15

## 2024-05-15 RX ORDER — LOSARTAN POTASSIUM 50 MG/1
50 TABLET, FILM COATED ORAL DAILY
Qty: 30 | Refills: 3 | Status: DISCONTINUED | COMMUNITY
End: 2024-05-15

## 2024-05-15 RX ORDER — BLOOD-GLUCOSE METER
W/DEVICE EACH MISCELLANEOUS
Qty: 1 | Refills: 0 | Status: DISCONTINUED | COMMUNITY
Start: 2024-02-27 | End: 2024-05-15

## 2024-05-15 RX ORDER — CHROMIUM 200 MCG
TABLET ORAL
Refills: 0 | Status: ACTIVE | COMMUNITY

## 2024-05-15 RX ORDER — SENNOSIDES 8.6 MG TABLETS 8.6 MG/1
TABLET ORAL
Refills: 0 | Status: ACTIVE | COMMUNITY

## 2024-05-15 RX ORDER — BLOOD-GLUCOSE METER
KIT MISCELLANEOUS 4 TIMES DAILY
Qty: 2 | Refills: 0 | Status: DISCONTINUED | COMMUNITY
Start: 2023-11-14 | End: 2024-05-15

## 2024-05-15 RX ORDER — BLOOD-GLUCOSE METER
W/DEVICE KIT MISCELLANEOUS
Qty: 1 | Refills: 0 | Status: DISCONTINUED | COMMUNITY
Start: 2023-11-14 | End: 2024-05-15

## 2024-05-15 RX ORDER — GABAPENTIN 400 MG/1
400 CAPSULE ORAL
Refills: 0 | Status: DISCONTINUED | COMMUNITY
Start: 2017-04-18 | End: 2024-05-15

## 2024-05-15 RX ORDER — LANCETS 33 GAUGE
EACH MISCELLANEOUS
Qty: 180 | Refills: 0 | Status: DISCONTINUED | COMMUNITY
Start: 2023-11-14 | End: 2024-05-15

## 2024-05-15 RX ORDER — FLASH GLUCOSE SENSOR
KIT MISCELLANEOUS
Qty: 2 | Refills: 6 | Status: DISCONTINUED | COMMUNITY
Start: 2023-03-29 | End: 2024-05-15

## 2024-05-15 RX ORDER — TRAMADOL HYDROCHLORIDE 50 MG/1
50 TABLET, COATED ORAL
Qty: 14 | Refills: 0 | Status: DISCONTINUED | COMMUNITY
Start: 2023-03-08 | End: 2024-05-15

## 2024-05-15 RX ORDER — INSULIN GLARGINE 300 U/ML
300 INJECTION, SOLUTION SUBCUTANEOUS
Qty: 15 | Refills: 1 | Status: DISCONTINUED | COMMUNITY
Start: 2018-04-23 | End: 2024-05-15

## 2024-05-15 RX ORDER — ISOSORBIDE DINITRATE 30 MG/1
30 TABLET ORAL
Refills: 0 | Status: DISCONTINUED | COMMUNITY
Start: 2017-05-17 | End: 2024-05-15

## 2024-05-15 RX ORDER — SYRINGE AND NEEDLE,INSULIN,1ML 30 G X1/2"
30G X 1/2" SYRINGE, EMPTY DISPOSABLE MISCELLANEOUS
Qty: 300 | Refills: 5 | Status: DISCONTINUED | COMMUNITY
Start: 2018-01-16 | End: 2024-05-15

## 2024-05-15 RX ORDER — FENOFIBRATE 48 MG/1
48 TABLET ORAL
Qty: 30 | Refills: 5 | Status: DISCONTINUED | COMMUNITY
Start: 2018-12-11 | End: 2024-05-15

## 2024-05-15 RX ORDER — SYRING-NEEDL,DISP,INSUL,0.3 ML 31GX15/64"
31G X 15/64" SYRINGE, EMPTY DISPOSABLE MISCELLANEOUS
Qty: 2 | Refills: 5 | Status: DISCONTINUED | COMMUNITY
Start: 2018-05-16 | End: 2024-05-15

## 2024-05-15 RX ORDER — FLASH GLUCOSE SCANNING READER
EACH MISCELLANEOUS
Qty: 1 | Refills: 1 | Status: DISCONTINUED | COMMUNITY
Start: 2023-06-20 | End: 2024-05-15

## 2024-05-15 RX ORDER — BLOOD-GLUCOSE CONTROL, LOW
LOW EACH MISCELLANEOUS
Qty: 1 | Refills: 0 | Status: DISCONTINUED | COMMUNITY
Start: 2024-02-27 | End: 2024-05-15

## 2024-05-15 RX ORDER — BLOOD SUGAR DIAGNOSTIC
STRIP MISCELLANEOUS
Qty: 4 | Refills: 0 | Status: DISCONTINUED | COMMUNITY
Start: 2018-05-15 | End: 2024-05-15

## 2024-05-15 RX ORDER — SYRINGE AND NEEDLE,INSULIN,1ML 28GX1/2"
31G X 5/16" SYRINGE, EMPTY DISPOSABLE MISCELLANEOUS
Qty: 450 | Refills: 1 | Status: DISCONTINUED | COMMUNITY
Start: 2018-05-16 | End: 2024-05-15

## 2024-05-15 RX ORDER — FUROSEMIDE 40 MG/1
40 TABLET ORAL
Refills: 0 | Status: DISCONTINUED | COMMUNITY
Start: 2017-05-17 | End: 2024-05-15

## 2024-05-15 RX ORDER — FLASH GLUCOSE SCANNING READER
EACH MISCELLANEOUS
Qty: 1 | Refills: 0 | Status: DISCONTINUED | COMMUNITY
Start: 2022-01-14 | End: 2024-05-15

## 2024-05-15 RX ORDER — FLASH GLUCOSE SCANNING READER
EACH MISCELLANEOUS
Qty: 1 | Refills: 0 | Status: DISCONTINUED | COMMUNITY
Start: 2019-03-11 | End: 2024-05-15

## 2024-05-15 NOTE — DATA REVIEWED
[FreeTextEntry1] : I reviewed the CAT scan dated April 9.  It is consistent with a small umbilical hernia and diastases recti

## 2024-05-15 NOTE — ASSESSMENT
[FreeTextEntry1] : This patient is suffering from a symptomatic umbilical hernia.  I have offered him repair of this hernia.  The procedure as well as risks(including, but not limited to bleeding, infection, injury to hollow viscus, or other organs), benefits (pain relief), and alternatives were explained to the patient.  Please advise me on the safety of general anesthesia as well as discontinuing his aspirin and/or Plavix preoperatively.

## 2024-05-15 NOTE — HISTORY OF PRESENT ILLNESS
[de-identified] : Jeramie is a 64 year old male here for a consultation for Umbilical hernia  [de-identified] : This 64-year-old complains of a longstanding umbilical hernia which is only recently begun to be painful.  His pain is described as moderate.  He has no nausea or emesis.  He denies any change in bowel or urinary habits.

## 2024-05-15 NOTE — PHYSICAL EXAM
[Normal Breath Sounds] : Normal breath sounds [Normal Heart Sounds] : normal heart sounds [No Rash or Lesion] : No rash or lesion [Calm] : calm [de-identified] : No distress [de-identified] : Sclera clear [de-identified] : Supple [de-identified] : Obese and soft.  There is moderate tenderness about the umbilicus.  I am unable to discern the dimensions of the defect due to his tenderness and body habitus.  Diastases recti is present [de-identified] : No clubbing cyanosis or edema [de-identified] : Cranial nerves II through XII grossly intact
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
## 2024-05-21 ENCOUNTER — OUTPATIENT (OUTPATIENT)
Dept: OUTPATIENT SERVICES | Facility: HOSPITAL | Age: 64
LOS: 1 days | End: 2024-05-21
Payer: MEDICARE

## 2024-05-21 VITALS
SYSTOLIC BLOOD PRESSURE: 163 MMHG | HEIGHT: 67 IN | TEMPERATURE: 99 F | HEART RATE: 89 BPM | DIASTOLIC BLOOD PRESSURE: 74 MMHG | RESPIRATION RATE: 18 BRPM | WEIGHT: 192.9 LBS | OXYGEN SATURATION: 100 %

## 2024-05-21 DIAGNOSIS — I25.5 ISCHEMIC CARDIOMYOPATHY: ICD-10-CM

## 2024-05-21 DIAGNOSIS — I50.9 HEART FAILURE, UNSPECIFIED: Chronic | ICD-10-CM

## 2024-05-21 DIAGNOSIS — G47.33 OBSTRUCTIVE SLEEP APNEA (ADULT) (PEDIATRIC): ICD-10-CM

## 2024-05-21 DIAGNOSIS — N18.6 END STAGE RENAL DISEASE: ICD-10-CM

## 2024-05-21 DIAGNOSIS — E11.9 TYPE 2 DIABETES MELLITUS WITHOUT COMPLICATIONS: ICD-10-CM

## 2024-05-21 DIAGNOSIS — Z95.5 PRESENCE OF CORONARY ANGIOPLASTY IMPLANT AND GRAFT: Chronic | ICD-10-CM

## 2024-05-21 DIAGNOSIS — K42.9 UMBILICAL HERNIA WITHOUT OBSTRUCTION OR GANGRENE: ICD-10-CM

## 2024-05-21 DIAGNOSIS — Z98.89 OTHER SPECIFIED POSTPROCEDURAL STATES: Chronic | ICD-10-CM

## 2024-05-21 DIAGNOSIS — Z01.818 ENCOUNTER FOR OTHER PREPROCEDURAL EXAMINATION: ICD-10-CM

## 2024-05-21 DIAGNOSIS — I77.0 ARTERIOVENOUS FISTULA, ACQUIRED: Chronic | ICD-10-CM

## 2024-05-21 DIAGNOSIS — Z98.890 OTHER SPECIFIED POSTPROCEDURAL STATES: Chronic | ICD-10-CM

## 2024-05-21 DIAGNOSIS — E11.319 TYPE 2 DIABETES MELLITUS WITH UNSPECIFIED DIABETIC RETINOPATHY WITHOUT MACULAR EDEMA: Chronic | ICD-10-CM

## 2024-05-21 LAB
ANION GAP SERPL CALC-SCNC: 19 MMOL/L — HIGH (ref 5–17)
BUN SERPL-MCNC: 63 MG/DL — HIGH (ref 7–23)
CALCIUM SERPL-MCNC: 8.7 MG/DL — SIGNIFICANT CHANGE UP (ref 8.4–10.5)
CHLORIDE SERPL-SCNC: 100 MMOL/L — SIGNIFICANT CHANGE UP (ref 96–108)
CO2 SERPL-SCNC: 23 MMOL/L — SIGNIFICANT CHANGE UP (ref 22–31)
CREAT SERPL-MCNC: 7.58 MG/DL — HIGH (ref 0.5–1.3)
EGFR: 7 ML/MIN/1.73M2 — LOW
GLUCOSE SERPL-MCNC: 151 MG/DL — HIGH (ref 70–99)
HCT VFR BLD CALC: 34.7 % — LOW (ref 39–50)
HGB BLD-MCNC: 11.4 G/DL — LOW (ref 13–17)
MCHC RBC-ENTMCNC: 31 PG — SIGNIFICANT CHANGE UP (ref 27–34)
MCHC RBC-ENTMCNC: 32.9 GM/DL — SIGNIFICANT CHANGE UP (ref 32–36)
MCV RBC AUTO: 94.3 FL — SIGNIFICANT CHANGE UP (ref 80–100)
NRBC # BLD: 0 /100 WBCS — SIGNIFICANT CHANGE UP (ref 0–0)
PLATELET # BLD AUTO: 217 K/UL — SIGNIFICANT CHANGE UP (ref 150–400)
POTASSIUM SERPL-MCNC: 4.4 MMOL/L — SIGNIFICANT CHANGE UP (ref 3.5–5.3)
POTASSIUM SERPL-SCNC: 4.4 MMOL/L — SIGNIFICANT CHANGE UP (ref 3.5–5.3)
RBC # BLD: 3.68 M/UL — LOW (ref 4.2–5.8)
RBC # FLD: 13.2 % — SIGNIFICANT CHANGE UP (ref 10.3–14.5)
SODIUM SERPL-SCNC: 142 MMOL/L — SIGNIFICANT CHANGE UP (ref 135–145)
WBC # BLD: 6.76 K/UL — SIGNIFICANT CHANGE UP (ref 3.8–10.5)
WBC # FLD AUTO: 6.76 K/UL — SIGNIFICANT CHANGE UP (ref 3.8–10.5)

## 2024-05-21 PROCEDURE — 87641 MR-STAPH DNA AMP PROBE: CPT

## 2024-05-21 PROCEDURE — 80048 BASIC METABOLIC PNL TOTAL CA: CPT

## 2024-05-21 PROCEDURE — 85027 COMPLETE CBC AUTOMATED: CPT

## 2024-05-21 PROCEDURE — 86803 HEPATITIS C AB TEST: CPT

## 2024-05-21 PROCEDURE — G0463: CPT

## 2024-05-21 PROCEDURE — 87640 STAPH A DNA AMP PROBE: CPT

## 2024-05-21 PROCEDURE — 83036 HEMOGLOBIN GLYCOSYLATED A1C: CPT

## 2024-05-21 RX ORDER — PANTOPRAZOLE SODIUM 20 MG/1
1 TABLET, DELAYED RELEASE ORAL
Refills: 0 | DISCHARGE

## 2024-05-21 RX ORDER — ASCORBIC ACID 60 MG
1 TABLET,CHEWABLE ORAL
Refills: 0 | DISCHARGE

## 2024-05-21 RX ORDER — GABAPENTIN 400 MG/1
1 CAPSULE ORAL
Refills: 0 | DISCHARGE

## 2024-05-21 RX ORDER — FAMOTIDINE 10 MG/ML
1 INJECTION INTRAVENOUS
Refills: 0 | DISCHARGE

## 2024-05-21 RX ORDER — SERTRALINE 25 MG/1
1 TABLET, FILM COATED ORAL
Refills: 0 | DISCHARGE

## 2024-05-21 RX ORDER — INSULIN LISPRO 100/ML
12 VIAL (ML) SUBCUTANEOUS
Refills: 0 | DISCHARGE

## 2024-05-21 RX ORDER — INSULIN GLARGINE 100 [IU]/ML
24 INJECTION, SOLUTION SUBCUTANEOUS
Refills: 0 | DISCHARGE

## 2024-05-21 RX ORDER — SENNA PLUS 8.6 MG/1
2 TABLET ORAL
Refills: 0 | DISCHARGE

## 2024-05-21 RX ORDER — DULAGLUTIDE 4.5 MG/.5ML
0.75 INJECTION, SOLUTION SUBCUTANEOUS
Refills: 0 | DISCHARGE

## 2024-05-21 RX ORDER — ALLOPURINOL 300 MG
1 TABLET ORAL
Refills: 0 | DISCHARGE

## 2024-05-21 RX ORDER — ASPIRIN/CALCIUM CARB/MAGNESIUM 324 MG
1 TABLET ORAL
Refills: 0 | DISCHARGE

## 2024-05-21 RX ORDER — CALCIUM CARBONATE 500(1250)
2 TABLET ORAL
Refills: 0 | DISCHARGE

## 2024-05-21 RX ORDER — CARVEDILOL PHOSPHATE 80 MG/1
1 CAPSULE, EXTENDED RELEASE ORAL
Refills: 0 | DISCHARGE

## 2024-05-21 RX ORDER — LANOLIN ALCOHOL/MO/W.PET/CERES
3 CREAM (GRAM) TOPICAL
Refills: 0 | DISCHARGE

## 2024-05-21 RX ORDER — ATORVASTATIN CALCIUM 80 MG/1
1 TABLET, FILM COATED ORAL
Refills: 0 | DISCHARGE

## 2024-05-21 RX ORDER — FOLIC ACID 0.8 MG
1 TABLET ORAL
Refills: 0 | DISCHARGE

## 2024-05-21 RX ORDER — CLOPIDOGREL BISULFATE 75 MG/1
1 TABLET, FILM COATED ORAL
Qty: 0 | Refills: 0 | DISCHARGE

## 2024-05-21 RX ORDER — ACETAMINOPHEN 500 MG
2 TABLET ORAL
Qty: 0 | Refills: 0 | DISCHARGE

## 2024-05-21 NOTE — H&P PST ADULT - PROBLEM SELECTOR PLAN 1
Open umbilical hernia repair with mesh on 5/28/24 with Dr. Vineet Messina.  Pre-op instructions given. Questions answered.

## 2024-05-21 NOTE — H&P PST ADULT - NEUROLOGICAL COMMENTS
"locking of fingers in hands" attributes to trigger finger, paresthesias in both feet and both and both hands

## 2024-05-21 NOTE — H&P PST ADULT - HISTORY OF PRESENT ILLNESS
65 yo male presents to PST prior to scheduled Open Umbilical Hernia Repair w/ Mesh on 5/28/24 with Dr. Vineet Messina. Pmhx includes HTN, HLD, CAD s/p stent placement (Plavix daily), DM, GERD, anxiety/depression, gout, BPH, diabetic foot ulcer. 65 yo male presents to [facility] prior to scheduled Open Umbilical Hernia Repair w/ Mesh on 5/28/24 with Dr. Vineet Messina. Pmhx includes HTN, HLD, CAD s/p stent placement (pt unsure exact date but states > 2 years ago?,Plavix daily), HFrEF (Echo from 2021 40-45%), cardiomyopathy, DM (started Trulicity last week; denies GI symptoms, last dose 5/13/24), ESRD on HD (MWF, [facility]; via LUE fistula), PVD, GERD, anxiety/depression, gout, BPH, diabetic foot ulcer (left heel), retinopathy, poor historian. Reports abdominal discomfort over umbilicus over the past few months; reports bulging but is reducible. Endorses intermittent constipation. Denies fever, chills, chest pain, palpitations, sob, dizziness, syncope.    Patient is poor historian and unable to recall last cardiac testing - used to see Dr. Torres at [facility]; has now changed to Dr. Leonard. Has Cardiomems but "does not transmit readings anymore".  65 yo male presents to [facility] prior to scheduled Open Umbilical Hernia Repair w/ Mesh on 5/28/24 with Dr. Vineet Messina. Pmhx includes HTN, HLD, CAD s/p stent placement (2010 TRICIA to Diag ; 11/18/2010  LAD; 2/4/11 ATOM liberte Diag; 5/15/2017  TRICIA to 1st diag; 6/7/17 PCI with laser and high pressure balloon) on ASA 81 and Plavix QD, HFrEF (5/2023 EF 45-50%), cardiomyopathy, DM (started Trulicity last week; denies GI symptoms, last dose 5/13/24), ESRD on HD (MWF, [facility]; via LUE fistula), PVD, GERD, anxiety/depression, gout, BPH, diabetic foot ulcer (left heel), retinopathy, poor historian. Reports abdominal discomfort over umbilicus over the past few months; reports bulging but is reducible. Endorses intermittent constipation. Denies fever, chills, chest pain, palpitations, sob, dizziness, syncope.    Patient is poor historian and unable to recall last cardiac testing - used to see Dr. Torres at [facility]; has now changed to Dr. Leonard. Has Cardiomems but "does not transmit readings anymore". Requesting cardiac eval prior to procedure, discussed w/ wife.

## 2024-05-21 NOTE — H&P PST ADULT - NSANTHOSAYNRD_GEN_A_CORE
No. MERYL screening performed.  STOP BANG Legend: 0-2 = LOW Risk; 3-4 = INTERMEDIATE Risk; 5-8 = HIGH Risk

## 2024-05-21 NOTE — H&P PST ADULT - ASSESSMENT
DASI score:  DASI activity:   Loose teeth or denture: lower denture  DASI score: 5  DASI activity: walks around home, ADLs, walks 2 blocks with walker  Loose teeth or denture: lower denture

## 2024-05-21 NOTE — H&P PST ADULT - NSICDXPASTMEDICALHX_GEN_ALL_CORE_FT
PAST MEDICAL HISTORY:  ASHD (arteriosclerotic heart disease)     BPH (benign prostatic hyperplasia)     CAD (coronary artery disease)     CKD (chronic kidney disease)     Diabetes Mellitus Type II, Uncontrolled     Diabetic retinopathy     Dyslipidemia     ESRD on dialysis     GERD (gastroesophageal reflux disease)     Gout     H/O retinopathy     Hepatitis     HTN (hypertension)     Ischemic cardiomyopathy     Myocardial infarction     Nephrolithiasis     Pulmonary hypertension     s/p Angioplasty with Stent     Vitamin D deficiency      PAST MEDICAL HISTORY:  ASHD (arteriosclerotic heart disease)     BPH (benign prostatic hyperplasia)     CAD (coronary artery disease)     CKD (chronic kidney disease)     Diabetes Mellitus Type II, Uncontrolled     Diabetic retinopathy     Dyslipidemia     ESRD on dialysis     GERD (gastroesophageal reflux disease)     Gout     H/O diabetic neuropathy     H/O retinopathy     Hepatitis     Hernia, umbilical     HTN (hypertension)     Ischemic cardiomyopathy     Myocardial infarction     Nephrolithiasis     Pulmonary hypertension     s/p Angioplasty with Stent     Vitamin D deficiency

## 2024-05-21 NOTE — H&P PST ADULT - PROBLEM SELECTOR PLAN 5
FS DOS.  Last dose Trulicity 5/13/24 - patient forgot to take yesterday's dose.  Last dose Toujeo 5/27/24 - reduce dose by 20% from 24 units to 19 units.  No insulin day of procedure.  Patient to confirm plan with endocrinologist.

## 2024-05-21 NOTE — H&P PST ADULT - PROBLEM SELECTOR PLAN 3
Last dose plavix 5/22/24 - patient to confirm w/ cardiologist and obtain cardiac clearance prior to surgery. Continue aspirin. Continue antihypertensive meds.

## 2024-05-21 NOTE — H&P PST ADULT - OTHER CARE PROVIDERS
Cardiologist - Dr. Lincoln Leonard , Nephrologist - Dr. Rhoades [phone], Nephrologist - Dr. Hector Flowers - [phone], [facility] in [facility], Endocrinologist - Dr. Steve Delgadillo e - [phone], Cardiologist - Dr. Lincoln Leonard , Neurologist - Dr. Rhoades [phone], Nephrologist - Dr. Hector Flowers - [phone], [facility] in [facility], Endocrinologist - Dr. Steve Delgadillo e - [phone],

## 2024-05-21 NOTE — H&P PST ADULT - NSICDXPASTSURGICALHX_GEN_ALL_CORE_FT
PAST SURGICAL HISTORY:  AV fistula     H/O lithotripsy     History of eye surgery left eye    S/P coronary artery stent placement 2010 TRICIA to Diag ; 11/18/2010  LAD; 2/4/11 ATOM liberte Diag; 5/15/2017  TRICIA to 1st diag; 6/7/17 PCI with laser and high pressure balloon    S/P foot surgery, left

## 2024-05-22 ENCOUNTER — NON-APPOINTMENT (OUTPATIENT)
Age: 64
End: 2024-05-22

## 2024-05-22 DIAGNOSIS — Z22.321 CARRIER OR SUSPECTED CARRIER OF METHICILLIN SUSCEPTIBLE STAPHYLOCOCCUS AUREUS: ICD-10-CM

## 2024-05-22 LAB
A1C WITH ESTIMATED AVERAGE GLUCOSE RESULT: 10.7 % — HIGH (ref 4–5.6)
ESTIMATED AVERAGE GLUCOSE: 260 MG/DL — HIGH (ref 68–114)
HCV AB S/CO SERPL IA: 0.07 S/CO — SIGNIFICANT CHANGE UP (ref 0–0.99)
HCV AB SERPL-IMP: SIGNIFICANT CHANGE UP
MRSA PCR RESULT.: SIGNIFICANT CHANGE UP
S AUREUS DNA NOSE QL NAA+PROBE: DETECTED

## 2024-05-22 RX ORDER — MUPIROCIN 20 MG/G
2 OINTMENT TOPICAL
Qty: 1 | Refills: 0 | Status: ACTIVE | COMMUNITY
Start: 2024-05-22 | End: 1900-01-01

## 2024-05-28 ENCOUNTER — APPOINTMENT (OUTPATIENT)
Dept: SURGERY | Facility: HOSPITAL | Age: 64
End: 2024-05-28

## 2024-07-21 NOTE — PROGRESS NOTE ADULT - PROBLEM/PLAN-4
Requested Prescriptions     Pending Prescriptions Disp Refills    methylphenidate (RITALIN) 20 MG tablet [Pharmacy Med Name: METHYLPHENIDAT 20MG TAB DD] 30 tablet 0     Sig: TAKE 1 TABLET BY MOUTH DAILY    Deniz Fine  MG per tablet [Pharmacy Med Name: Deniz Fine 10MG/325MG] 90 tablet 0     Sig: TAKE 1 TABLET BY MOUTH 3 TIMES DAILY AS NEEDED FOR PAIN.  REDUCE DOSESA S PAIN BECOMES MANAGEABLE       Last Office Visit:  12/10/2020  Next Office Visit:  2/2/2021  Last Medication Refill:  Both 1/2/2021  Rafael Echols up to date:  12/31/2020    *RX updated to reflect   2/1/2021  fill date*
DISPLAY PLAN FREE TEXT
DISPLAY PLAN FREE TEXT
Yes

## 2024-07-30 NOTE — ED PROVIDER NOTE - PHYSICAL EXAMINATION
PHYSICAL EXAM:  GENERAL: Sitting comfortable in bed, in no acute distress  HENMT: 3x3 cm ecchymosis under right mandible, no ttp of the facial bones, moist mucous membranes, no oropharyngeal exudates or vesicles, uvula is midline EYES: Clear bilaterally, PERRL, EOMs intact b/l  HEART: RRR, S1/S2, no murmur  RESPIRATORY: Clear to auscultation bilaterally, no wheezes/rhonchi/rales  ABDOMEN: +BS, soft, nontender, nondistended  EXTREMITIES: R knee ttp, able to actively range both knees, L shin abrasion  NEURO: A&Ox4, CN II-XII intact, no focal motor deficits or sensory deficits English

## 2024-08-07 NOTE — ED ADULT NURSE NOTE - ED STAT RN HAND OFF
2.33 10/04/2021    T4FREE 1.1 09/17/2015     Lab Results   Component Value Date    WBC 10.4 06/26/2024    HGB 13.0 06/26/2024    HCT 39.4 06/26/2024    MCV 83.5 06/26/2024     06/26/2024     Lab Results   Component Value Date    INR 1.06 05/11/2021      No results found for: \"PSA\"   No results found for: \"LABURIC\"     Patient Active Problem List   Diagnosis    Environmental and seasonal allergies    Mild persistent asthma without complication    Recurrent UTI    Hematuria    Numbness and tingling in right hand    Hammer toe of right foot    Arthritis of right knee    Status post total left knee replacement    Paraesophageal hernia    Gastroesophageal reflux disease without esophagitis    Ataxia    Seborrheic dermatitis    Vitamin D deficiency       Allergies   Allergen Reactions    Acetaminophen Nausea And Vomiting    Oxycodone Hcl Nausea And Vomiting    Bactrim [Sulfamethoxazole-Trimethoprim] Nausea Only    Cephalexin      Vomiting    Ciprofloxacin Nausea Only    Oxycodone     Erythromycin Nausea And Vomiting    Pcn [Penicillins] Hives and Nausea And Vomiting     Vomiting and rash on penicillin as a child.     Outpatient Medications Marked as Taking for the 8/7/24 encounter (Telemedicine) with Ary Rosa MD   Medication Sig Dispense Refill    fluticasone-salmeterol (ADVAIR) 250-50 MCG/ACT AEPB diskus inhaler Inhale 1 puff into the lungs in the morning and 1 puff in the evening. 60 each 3    polyethylene glycol (GLYCOLAX) 17 GM/SCOOP powder Every 3-4 days as needed constipation 510 g 0    ketoconazole (NIZORAL) 2 % shampoo Apply shampoo three times weekly (M, W, F) leave in 3-5 min, then rinse. May alternate with other shampoos 120 mL 10    omeprazole (PRILOSEC) 40 MG delayed release capsule Take 1 capsule by mouth every morning (before breakfast) 90 capsule 3    fluticasone (FLONASE) 50 MCG/ACT nasal spray 1 spray by Each Nostril route 2 times daily 3 each 3    albuterol sulfate HFA (VENTOLIN HFA) 108  Handoff

## 2024-08-19 NOTE — PROGRESS NOTE ADULT - PROBLEM SELECTOR PROBLEM 3
"Use prescribed ointment as instructed.  Tylenol for pain or fever.  Highly recommended to do warm compresses frequently to the left upper eyelid multiple times throughout the day.  Follow-up with your eye doctor family doctor if no improvement.  Follow up with PCP in 3-5 days.  Proceed to  ER if symptoms worsen.    If tests are performed, our office will contact you with results only if changes need to made to the care plan discussed with you at the visit. You can [facility] full [facility]  Patient Education     Stye   The Basics   Written by the doctors and editors at [facility]   What is a stye? -- A stye is a red and painful lump on the eyelid. It happens when a small gland on the edge of the eyelid gets infected or inflamed. Styes can form on the upper or lower eyelids. Most styes get better on their own after a few days to a week. The medical term for stye is \"hordeolum.\"  People sometimes get a stye confused with a different eye problem called a \"chalazion.\" A chalazion also causes a lump on the eyelid. But a stye is caused by an infection and is painful. A chalazion is not tender or painful, but it often lasts longer than a stye does.  What are the symptoms of a stye? -- People who have a stye have a painful lump on the edge of their eyelid (picture 1). The lump might look red, swollen, or similar to a pimple.  A stye usually develops over a few days. Styes can cause other symptoms, too, such as tearing and eyelid pain and swelling.  Is there a test for a stye? -- No. But your doctor or nurse should be able to tell if you have a stye by talking with you and doing an exam.  Is there anything I can do on my own? -- Yes:   Put a warm, wet compress on the stye. Wet a clean washcloth with warm water, and put it over your stye. When the washcloth cools, reheat it with warm water and put it back over the stye. Repeat these steps for about 15 minutes, and try to do this 4 times a day.   It might " help to gently massage your eyelid.   Do not squeeze or try to pop your stye. This can make it worse.   Do not wear eye makeup or contact lenses until your stye is gone.  What treatments might my doctor use? -- If your stye doesn't get better or if it leads to other problems, your doctor might:   Prescribe a cream or ointment that goes in the eye and on the eyelid   Prescribe antibiotic medicines   Do a procedure to drain the stye  Can styes be prevented? -- Yes. To lower your chances of getting a stye, you can:   Wash your hands often - It's especially important to wash your hands before you touch your eyes. Also, if you wear contact lenses, keep them clean and wash your hands before you put them in.   Be careful with your eye makeup - Wearing eye makeup can sometimes cause a stye. Remove your eye makeup each night, and throw away old makeup. Do not share eye makeup with other people.  When should I call the doctor? -- Call your doctor or nurse for advice if:   Your stye doesn't go away after using warm compresses for 1 to 2 weeks.   Your stye gets very big, bleeds, or affects your vision.   Your whole eye is red, or your whole eyelid is red or swollen.   The redness or swelling spreads to your cheek or other parts of your face.  All topics are updated as new evidence becomes available and our peer review process is complete.  This topic retrieved from [employer] on: Feb 26, 2024.  Topic 93070 Version 17.0  Release: 32.2.4 - C32.56  © 2024 UpToDate, Inc. and/or its affiliates. All rights reserved.  picture 1: Stye     This person has a stye on the lower eyelid.  Graphic 95979 Version 2.0  Consumer Information Use and Disclaimer   Disclaimer: This generalized information is a limited summary of diagnosis, treatment, and/or medication information. It is not meant to be comprehensive and should be used as a tool to help the user understand and/or assess potential diagnostic and treatment options. It does NOT include all  information about conditions, treatments, medications, side effects, or risks that may apply to a specific patient. It is not intended to be medical advice or a substitute for the medical advice, diagnosis, or treatment of a health care provider based on the health care provider's examination and assessment of a patient's specific and unique circumstances. Patients must speak with a health care provider for complete information about their health, medical questions, and treatment options, including any risks or benefits regarding use of medications. This information does not endorse any treatments or medications as safe, effective, or approved for treating a specific patient. UpToDate, Inc. and its affiliates disclaim any warranty or liability relating to this information or the use thereof.The use of this information is governed by the Terms of Use, available at https://www.[employer].com/en/know/clinical-effectiveness-terms. 2024© UpToDate, Inc. and its affiliates and/or licensors. All rights reserved.  Copyright   © 2024 UpToDate, Inc. and/or its affiliates. All rights reserved.     Hyperlipidemia

## 2024-08-20 ENCOUNTER — APPOINTMENT (OUTPATIENT)
Dept: ENDOCRINOLOGY | Facility: CLINIC | Age: 64
End: 2024-08-20
Payer: MEDICARE

## 2024-08-20 VITALS
SYSTOLIC BLOOD PRESSURE: 124 MMHG | OXYGEN SATURATION: 98 % | WEIGHT: 202 LBS | HEART RATE: 88 BPM | DIASTOLIC BLOOD PRESSURE: 70 MMHG | HEIGHT: 67 IN | BODY MASS INDEX: 31.71 KG/M2

## 2024-08-20 DIAGNOSIS — E78.00 PURE HYPERCHOLESTEROLEMIA, UNSPECIFIED: ICD-10-CM

## 2024-08-20 DIAGNOSIS — I10 ESSENTIAL (PRIMARY) HYPERTENSION: ICD-10-CM

## 2024-08-20 DIAGNOSIS — E11.9 TYPE 2 DIABETES MELLITUS W/OUT COMPLICATIONS: ICD-10-CM

## 2024-08-20 PROBLEM — N18.6 END STAGE RENAL DISEASE: Chronic | Status: ACTIVE | Noted: 2024-05-21

## 2024-08-20 PROBLEM — Z86.69 PERSONAL HISTORY OF OTHER DISEASES OF THE NERVOUS SYSTEM AND SENSE ORGANS: Chronic | Status: ACTIVE | Noted: 2024-05-21

## 2024-08-20 PROBLEM — K42.9 UMBILICAL HERNIA WITHOUT OBSTRUCTION OR GANGRENE: Chronic | Status: ACTIVE | Noted: 2024-05-21

## 2024-08-20 PROBLEM — Z86.39 PERSONAL HISTORY OF OTHER ENDOCRINE, NUTRITIONAL AND METABOLIC DISEASE: Chronic | Status: ACTIVE | Noted: 2024-05-21

## 2024-08-20 LAB
GLUCOSE BLDC GLUCOMTR-MCNC: 206
HBA1C MFR BLD HPLC: 9.1

## 2024-08-20 PROCEDURE — 99214 OFFICE O/P EST MOD 30 MIN: CPT

## 2024-08-20 PROCEDURE — 83036 HEMOGLOBIN GLYCOSYLATED A1C: CPT | Mod: QW

## 2024-08-20 PROCEDURE — 82962 GLUCOSE BLOOD TEST: CPT

## 2024-08-20 PROCEDURE — 95251 CONT GLUC MNTR ANALYSIS I&R: CPT

## 2024-08-20 PROCEDURE — G2211 COMPLEX E/M VISIT ADD ON: CPT

## 2024-08-20 NOTE — ASSESSMENT
[Diabetes Foot Care] : diabetes foot care [Carbohydrate Consistent Diet] : carbohydrate consistent diet [Importance of Diet and Exercise] : importance of diet and exercise to improve glycemic control, achieve weight loss and improve cardiovascular health [Exercise/Effect on Glucose] : exercise/effect on glucose [Retinopathy Screening] : Patient was referred to ophthalmology for retinopathy screening [FreeTextEntry1] : Mr. ADAM KOWALSKI is a 64 year year old M here for evaluation and treatment of diabetes mellitus type II, poorly controlled with multiple microvascular and macrovascular complication.  1. Type 2 diabetes mellitus Point-of-care 7.2% on March 29, 2023 A1c 5/14/24: 11.5%, markedly elevated A1c 8/20/2024: 9.1%, improved, Glucose Monitoring: target 10%, high 16%, very high 76%, still running high.   I discussed with the patient that the goal A1c is to be less than 7%.  BARRIERS IDENTIFIED: COST: Filled out patient assistance program for the patient to get Toujeo and Admelog. KNOWLEDGE DEFICIT: Provided education and teach back on Trulicity injection. Incorrect dosing: The patient often is taking insulin after meals.  I expressed my concern for longstanding uncontrolled type 2 diabetes including micro and macrovascular complications, especially in the setting of potential renal transplant and umbilical hernia surgery.  Patient is at risk for poor wound healing.  PLAN: Increase Toujeo to 28 units at bedtime Continue Humalog to 12 units 3 times daily with meals plus 1:50>150.  DOSE BEFORE MEALS.  Increase to Trulicity 1.5 mg once weekly.   I counseled that patient that if severe abdominal pain and nausea and vomiting occurs, the pt should stop GLP and go to the ER. We also discussed the more mild side effect of abdominal discomfort/nausea with GLP  initiation, which should improve over time. The patient was instructed to let me know if the side effects are intolerable/do not improve.  Would like start patient on a GLP agonist. We discussed the pros and cons of this class of medications, including the benefits of glucose lowering,weight loss, cardiovascular and renal benefits and the warnings for pancreatitis, medullary thyroid cancer, and acute kidney injury. Patient is agreeable with starting a medication of this class.  Glucose significantly elevated postprandially due to dietary indiscretion Patient's glucose control was much better while he was hospitalized with a diet controlled.  Continue to follow-up with ophthalmologist once annually. Ophthalmologist is aware that patient is currently on GLP-1 receptor agonist.  Attending wound care for leg wounds for podiatry care.  Discussed risks of the thyroid C-cell tumor, pancreatitis, hypoglycemia, hypersensitivity reactions, acute kidney injury, severe gastrointestinal complications, discussed that the most common adverse infections included nausea, diarrhea, vomiting, and abdominal pain. We discussed that gastric emptying slow by GLP-1 receptor agonist.  Diabetic retinopathy complications have been reported in a cardiovascular outcomes trial. Should have semi-annual eye exam and close follow up with his/her ophthalmologist.  He is getting foot care every few days.  2. Hypertension Blood pressure goal <130/80 Currently at goal. Following up with nephrology, patient is on hemodialysis.  3. Hyperlipidemia Continue with fenofibrate renally dosed. Continue with atorvastatin 80 mg once daily. Discussed diet and exercise  4. Depression On Sertraline. Refusing to see psych at this time.  Unclear whether the patient has had labs, as there are no labs in the patients chart since 2022 although the patient is following with transplant surgery.  I confirmed with the patient's wife at today's visit that he has had multiple full panels of labs and he will fax me results.

## 2024-08-20 NOTE — HISTORY OF PRESENT ILLNESS
[FreeTextEntry1] : Patient is a 64-year-old man with history of type 2 diabetes, hypertension, CKD, CAD status post cardiac stents, hypertension, hyperlipidemia, here for endocrinology follow-up visit.  Previously hospitalized for accidental swallowed denture.  Additionally, he had another laser tx for retinopathy as his vision was worsening.  Regarding type 2 diabetes, diagnosed more than 20+ years, complicated by diabetic retinopathy, neuropathy, and nephropathy, now pending AV fistula placement and potential dialysis.  PMHx History of type 2 diabetes - neuropathy, retinopathy, foot ulcer now healed On insulin A1c >10 Hypertension, CAD - post cardiac stents multiple - [facility], [facility], [facility]; on plavix and aspirin, last seen by cardiologist Oct 2023 Hyperlipidemia  Surgical History: Carpal tunnel Laser eye retinal Heel ulcer slow healing over 8 months Left leg angioplasty - known to vascular surgeon  Diabetes also complicated by CAD status post cardiac stents. He has a CardioMEMS. No history of CHF or CVA in the past. He follows up with Dr. Jonathan Torres from [facility].  A1c trend: A1c 5/14/24: 11.5%, markedly uncontrolled.  A1c 8/20/2024: 9.1%  CURRENT REGIMEN: Toujeo 24 units at bedtime Humalog to 12 units 3 times daily with meals plus 2:50>150. He forgets and takes it after.  Trulicity 0.75 mg once weekly.- he is missing shots here and there.   Glucose Monitoring: target 10%, high 16%, very high 76%, still running high.   Wife is a dialysis technician. Both patient and wife endorsed poor dietary habits. Patient lost his job last year, has been sitting at home and not doing much since then. Usually eats Chinese takeout for lunch and dinner. Patient endorsed that he likes carbohydrate and likes rice and noodles. He denies engaging in any exercise, watching TV for most of the day.  Regarding hypertension, currently managed by his new nephrologist. He is pending AV fistula creation with vascular doctor in the next few weeks. He may be heading towards hemodialysis.  Regarding hyperlipidemia patient is on statin therapy with atorvastatin 80 mg once daily as well as fenofibrate 48 mg once daily. [FreeTextEntry4] : 0

## 2024-09-07 NOTE — ED PROVIDER NOTE - WR ORDER ID 4
[facility]  Urology  Progress Note    Patient Name: Arturo Salguero III  MRN: [id number]  Admission Date: 9/3/2024  Hospital Length of Stay: 4 days  Code Status: Full Code   Attending Provider: Tapan Barrow MD   Primary Care Physician: Glen Andrade MD    Subjective:     HPI:  30M s/p RPLND on 9/3    Interval History: NAOE. Pain controlled after PNCs removed yesterday  BM yesterday, passing gas.   Labs stable this am   Says he feels ready to go home this morning       Objective:     Temp:  [97.9 °F (36.6 °C)-98.1 °F (36.7 °C)] 97.9 °F (36.6 °C)  Pulse:  [54-77] 61  Resp:  [18] 18  SpO2:  [98 %-99 %] 98 %  BP: (124-151)/(64-74) 128/68     Body mass index is 29.56 kg/m².           Drains       Drain  Duration                  Closed/Suction Drain 09/03/24 1413 Right;Lateral RLQ Bulb 15 Fr. 3 days                     Physical Exam  Vitals reviewed.   Constitutional:       General: He is not in acute distress.     Appearance: Normal appearance.   HENT:      Head: Normocephalic.   Eyes:      Extraocular Movements: Extraocular movements intact.      Pupils: Pupils are equal, round, and reactive to light.   Cardiovascular:      Rate and Rhythm: Normal rate.   Pulmonary:      Effort: Pulmonary effort is normal.   Abdominal:      General: Abdomen is flat. There is no distension.      Palpations: Abdomen is soft.      Tenderness: There is no abdominal tenderness. There is no guarding or rebound.      Comments: Surgical incisions clean, dry, and intact   Abdomen soft, appropriately tender  [facility]   Musculoskeletal:         General: Normal range of motion.   Skin:     General: Skin is warm and dry.      Capillary Refill: Capillary refill takes less than 2 seconds.   Neurological:      General: No focal deficit present.      Mental Status: He is alert.      Coordination: Coordination normal.   Psychiatric:         Mood and Affect: Mood normal.         Behavior: Behavior normal.           Significant Labs:    BMP:  Recent  Labs   Lab 09/05/24 0458 09/06/24  0257 09/07/24  0539    136 137   K 3.8 4.0 4.0    103 104   CO2 26 28 25   BUN 14 12 13   CREATININE 1.0 0.9 1.0   CALCIUM 8.3* 8.9 9.3       CBC:   Recent Labs   Lab 09/05/24 0458 09/06/24  0257 09/07/24  0539   WBC 8.48 7.03 7.59   HGB 11.8* 11.1* 12.3*   HCT 36.5* 35.1* 39.7*    233 277       All pertinent labs results from the past 24 hours have been reviewed.    Significant Imaging:  All pertinent imaging results/findings from the past 24 hours have been reviewed.                  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    - Diet: Low fat diet   - Ambulate 5x daily   - Incentive spirometer usage, 10x per hour   - MMPC  - Hemoglobin stable  - Creatinine at baseline     Dispo: Discharge this morning after drain is removed        VTE Risk Mitigation (From admission, onward)           Ordered     heparin (porcine) injection 5,000 Units  Every 8 hours         09/03/24 2531                    DODIE EVANS MD  Urology  [facility] - Surgery   [id number]

## 2024-09-12 ENCOUNTER — INPATIENT (INPATIENT)
Facility: HOSPITAL | Age: 64
LOS: 5 days | Discharge: HOME | DRG: 179 | End: 2024-09-18
Attending: INTERNAL MEDICINE | Admitting: INTERNAL MEDICINE
Payer: MEDICARE

## 2024-09-12 VITALS
HEART RATE: 102 BPM | HEIGHT: 67 IN | DIASTOLIC BLOOD PRESSURE: 93 MMHG | RESPIRATION RATE: 20 BRPM | OXYGEN SATURATION: 98 % | TEMPERATURE: 101 F | SYSTOLIC BLOOD PRESSURE: 177 MMHG | WEIGHT: 192.9 LBS

## 2024-09-12 DIAGNOSIS — Z95.5 PRESENCE OF CORONARY ANGIOPLASTY IMPLANT AND GRAFT: Chronic | ICD-10-CM

## 2024-09-12 DIAGNOSIS — Z98.890 OTHER SPECIFIED POSTPROCEDURAL STATES: Chronic | ICD-10-CM

## 2024-09-12 DIAGNOSIS — I77.0 ARTERIOVENOUS FISTULA, ACQUIRED: Chronic | ICD-10-CM

## 2024-09-12 DIAGNOSIS — Z98.89 OTHER SPECIFIED POSTPROCEDURAL STATES: Chronic | ICD-10-CM

## 2024-09-12 DIAGNOSIS — U07.1 COVID-19: ICD-10-CM

## 2024-09-12 LAB
ALBUMIN SERPL ELPH-MCNC: 4 G/DL — SIGNIFICANT CHANGE UP (ref 3.3–5)
ALP SERPL-CCNC: 78 U/L — SIGNIFICANT CHANGE UP (ref 40–120)
ALT FLD-CCNC: 9 U/L — LOW (ref 10–45)
ANION GAP SERPL CALC-SCNC: 17 MMOL/L — SIGNIFICANT CHANGE UP (ref 5–17)
AST SERPL-CCNC: 13 U/L — SIGNIFICANT CHANGE UP (ref 10–40)
BASOPHILS # BLD AUTO: 0.04 K/UL — SIGNIFICANT CHANGE UP (ref 0–0.2)
BASOPHILS NFR BLD AUTO: 0.4 % — SIGNIFICANT CHANGE UP (ref 0–2)
BILIRUB SERPL-MCNC: 0.6 MG/DL — SIGNIFICANT CHANGE UP (ref 0.2–1.2)
BUN SERPL-MCNC: 52 MG/DL — HIGH (ref 7–23)
CALCIUM SERPL-MCNC: 8.5 MG/DL — SIGNIFICANT CHANGE UP (ref 8.4–10.5)
CHLORIDE SERPL-SCNC: 97 MMOL/L — SIGNIFICANT CHANGE UP (ref 96–108)
CO2 SERPL-SCNC: 24 MMOL/L — SIGNIFICANT CHANGE UP (ref 22–31)
CREAT SERPL-MCNC: 8.51 MG/DL — HIGH (ref 0.5–1.3)
EGFR: 6 ML/MIN/1.73M2 — LOW
EOSINOPHIL # BLD AUTO: 0.09 K/UL — SIGNIFICANT CHANGE UP (ref 0–0.5)
EOSINOPHIL NFR BLD AUTO: 0.9 % — SIGNIFICANT CHANGE UP (ref 0–6)
FLUAV AG NPH QL: SIGNIFICANT CHANGE UP
FLUBV AG NPH QL: SIGNIFICANT CHANGE UP
GLUCOSE BLDC GLUCOMTR-MCNC: 318 MG/DL — HIGH (ref 70–99)
GLUCOSE BLDC GLUCOMTR-MCNC: 322 MG/DL — HIGH (ref 70–99)
GLUCOSE SERPL-MCNC: 360 MG/DL — HIGH (ref 70–99)
HCT VFR BLD CALC: 30.8 % — LOW (ref 39–50)
HGB BLD-MCNC: 9.9 G/DL — LOW (ref 13–17)
IMM GRANULOCYTES NFR BLD AUTO: 0.7 % — SIGNIFICANT CHANGE UP (ref 0–0.9)
LIDOCAIN IGE QN: 20 U/L — SIGNIFICANT CHANGE UP (ref 7–60)
LYMPHOCYTES # BLD AUTO: 1.37 K/UL — SIGNIFICANT CHANGE UP (ref 1–3.3)
LYMPHOCYTES # BLD AUTO: 12.9 % — LOW (ref 13–44)
MCHC RBC-ENTMCNC: 30.3 PG — SIGNIFICANT CHANGE UP (ref 27–34)
MCHC RBC-ENTMCNC: 32.1 GM/DL — SIGNIFICANT CHANGE UP (ref 32–36)
MCV RBC AUTO: 94.2 FL — SIGNIFICANT CHANGE UP (ref 80–100)
MONOCYTES # BLD AUTO: 1.23 K/UL — HIGH (ref 0–0.9)
MONOCYTES NFR BLD AUTO: 11.6 % — SIGNIFICANT CHANGE UP (ref 2–14)
NEUTROPHILS # BLD AUTO: 7.78 K/UL — HIGH (ref 1.8–7.4)
NEUTROPHILS NFR BLD AUTO: 73.5 % — SIGNIFICANT CHANGE UP (ref 43–77)
NRBC # BLD: 0 /100 WBCS — SIGNIFICANT CHANGE UP (ref 0–0)
PLATELET # BLD AUTO: 157 K/UL — SIGNIFICANT CHANGE UP (ref 150–400)
POTASSIUM SERPL-MCNC: 3.8 MMOL/L — SIGNIFICANT CHANGE UP (ref 3.5–5.3)
POTASSIUM SERPL-SCNC: 3.8 MMOL/L — SIGNIFICANT CHANGE UP (ref 3.5–5.3)
PROT SERPL-MCNC: 7.4 G/DL — SIGNIFICANT CHANGE UP (ref 6–8.3)
RBC # BLD: 3.27 M/UL — LOW (ref 4.2–5.8)
RBC # FLD: 14 % — SIGNIFICANT CHANGE UP (ref 10.3–14.5)
RSV RNA NPH QL NAA+NON-PROBE: SIGNIFICANT CHANGE UP
SARS-COV-2 RNA SPEC QL NAA+PROBE: DETECTED
SODIUM SERPL-SCNC: 138 MMOL/L — SIGNIFICANT CHANGE UP (ref 135–145)
WBC # BLD: 10.58 K/UL — HIGH (ref 3.8–10.5)
WBC # FLD AUTO: 10.58 K/UL — HIGH (ref 3.8–10.5)

## 2024-09-12 PROCEDURE — 36410 VNPNXR 3YR/> PHY/QHP DX/THER: CPT

## 2024-09-12 PROCEDURE — 71045 X-RAY EXAM CHEST 1 VIEW: CPT | Mod: 26

## 2024-09-12 PROCEDURE — 99285 EMERGENCY DEPT VISIT HI MDM: CPT

## 2024-09-12 RX ORDER — BENZONATATE 100 MG
100 CAPSULE ORAL ONCE
Refills: 0 | Status: COMPLETED | OUTPATIENT
Start: 2024-09-12 | End: 2024-09-12

## 2024-09-12 RX ORDER — DEXTROSE 15 G/33 G
15 GEL IN PACKET (GRAM) ORAL ONCE
Refills: 0 | Status: DISCONTINUED | OUTPATIENT
Start: 2024-09-12 | End: 2024-09-18

## 2024-09-12 RX ORDER — DEXTROSE 15 G/33 G
12.5 GEL IN PACKET (GRAM) ORAL ONCE
Refills: 0 | Status: DISCONTINUED | OUTPATIENT
Start: 2024-09-12 | End: 2024-09-18

## 2024-09-12 RX ORDER — ACETAMINOPHEN 325 MG/1
1000 TABLET ORAL ONCE
Refills: 0 | Status: COMPLETED | OUTPATIENT
Start: 2024-09-12 | End: 2024-09-12

## 2024-09-12 RX ORDER — SERTRALINE HYDROCHLORIDE 50 MG/1
25 TABLET, FILM COATED ORAL DAILY
Refills: 0 | Status: DISCONTINUED | OUTPATIENT
Start: 2024-09-12 | End: 2024-09-18

## 2024-09-12 RX ORDER — DEXTROSE 15 G/33 G
25 GEL IN PACKET (GRAM) ORAL ONCE
Refills: 0 | Status: DISCONTINUED | OUTPATIENT
Start: 2024-09-12 | End: 2024-09-18

## 2024-09-12 RX ORDER — CARVEDILOL 6.25 MG/1
6.25 TABLET ORAL EVERY 12 HOURS
Refills: 0 | Status: DISCONTINUED | OUTPATIENT
Start: 2024-09-12 | End: 2024-09-18

## 2024-09-12 RX ORDER — HEPARIN SODIUM,BOVINE 1000/ML
5000 VIAL (ML) INJECTION EVERY 8 HOURS
Refills: 0 | Status: DISCONTINUED | OUTPATIENT
Start: 2024-09-12 | End: 2024-09-18

## 2024-09-12 RX ORDER — REMDESIVIR 5 MG/ML
200 INJECTION INTRAVENOUS EVERY 24 HOURS
Refills: 0 | Status: COMPLETED | OUTPATIENT
Start: 2024-09-12 | End: 2024-09-12

## 2024-09-12 RX ORDER — PANTOPRAZOLE SODIUM 40 MG
40 TABLET, DELAYED RELEASE (ENTERIC COATED) ORAL
Refills: 0 | Status: DISCONTINUED | OUTPATIENT
Start: 2024-09-12 | End: 2024-09-18

## 2024-09-12 RX ORDER — ASPIRIN 81 MG
81 TABLET, DELAYED RELEASE (ENTERIC COATED) ORAL DAILY
Refills: 0 | Status: DISCONTINUED | OUTPATIENT
Start: 2024-09-12 | End: 2024-09-18

## 2024-09-12 RX ORDER — REMDESIVIR 5 MG/ML
100 INJECTION INTRAVENOUS EVERY 24 HOURS
Refills: 0 | Status: COMPLETED | OUTPATIENT
Start: 2024-09-13 | End: 2024-09-16

## 2024-09-12 RX ORDER — VALSARTAN 40 MG/1
40 TABLET ORAL DAILY
Refills: 0 | Status: DISCONTINUED | OUTPATIENT
Start: 2024-09-12 | End: 2024-09-18

## 2024-09-12 RX ORDER — SENNA 187 MG
2 TABLET ORAL AT BEDTIME
Refills: 0 | Status: DISCONTINUED | OUTPATIENT
Start: 2024-09-12 | End: 2024-09-18

## 2024-09-12 RX ORDER — FOLIC ACID 1 MG
1 TABLET ORAL DAILY
Refills: 0 | Status: DISCONTINUED | OUTPATIENT
Start: 2024-09-12 | End: 2024-09-18

## 2024-09-12 RX ORDER — REMDESIVIR 5 MG/ML
INJECTION INTRAVENOUS
Refills: 0 | Status: COMPLETED | OUTPATIENT
Start: 2024-09-12 | End: 2024-09-16

## 2024-09-12 RX ORDER — GABAPENTIN 100 MG
100 CAPSULE ORAL DAILY
Refills: 0 | Status: DISCONTINUED | OUTPATIENT
Start: 2024-09-12 | End: 2024-09-18

## 2024-09-12 RX ORDER — ALLOPURINOL 300 MG
100 TABLET ORAL DAILY
Refills: 0 | Status: DISCONTINUED | OUTPATIENT
Start: 2024-09-12 | End: 2024-09-18

## 2024-09-12 RX ORDER — GLUCAGON INJECTION, SOLUTION 1 MG/.2ML
1 INJECTION, SOLUTION SUBCUTANEOUS ONCE
Refills: 0 | Status: DISCONTINUED | OUTPATIENT
Start: 2024-09-12 | End: 2024-09-18

## 2024-09-12 RX ADMIN — REMDESIVIR 200 MILLIGRAM(S): 5 INJECTION INTRAVENOUS at 16:20

## 2024-09-12 RX ADMIN — Medication 2: at 22:05

## 2024-09-12 RX ADMIN — Medication 2 TABLET(S): at 21:31

## 2024-09-12 RX ADMIN — Medication 5000 UNIT(S): at 21:31

## 2024-09-12 RX ADMIN — Medication 80 MILLIGRAM(S): at 21:31

## 2024-09-12 RX ADMIN — ACETAMINOPHEN 400 MILLIGRAM(S): 325 TABLET ORAL at 14:00

## 2024-09-12 RX ADMIN — Medication 4: at 18:00

## 2024-09-12 RX ADMIN — Medication 100 MILLIGRAM(S): at 22:17

## 2024-09-12 NOTE — ED PROCEDURE NOTE - PROCEDURE ADDITIONAL DETAILS
Emergency Department Focused Ultrasound performed at patient's bedside for placement of ultrasound guided IV. The study was confirmed with blood return and ease of flushing saline. 20G in [facility] ac.

## 2024-09-12 NOTE — H&P ADULT - ASSESSMENT
64-year male past medical history ESRD MWF, CAD s/p stents, HTN, HLD, DM presents to ED for fatigue and muscle weakness x 5 days.  Patient COVID-positive at  advised to come to ED for IV remdesivir.  Completed dialysis yesterday.  Denies use of Tylenol for symptomatic relief.  Denies nausea, vomiting, chest pain, SOB, abdominal pain, urinary symptoms, change in bowel movement.  No recent travel or sick contacts  pt with sig cardiac and medical history with fatigue, sob nad generalized weakness pt  COVID +   start RDV  add steroid if hypoxemia  will consider possible ID eval  chest x ray noted will check ct chest no contrast  ASHD ischemic cardiomyopathy, continue all cardiac meds  r/o chf, fluid overload repeat echo  ESRD on HD, renal eval  continue all cardiac meds  DM fs with coverage  dvt prophylaxis

## 2024-09-12 NOTE — H&P ADULT - NSHPLABSRESULTS_GEN_ALL_CORE
FluA/FluB/RSV/COVID PCR (09.12.24 @ 14:19)    SARS-CoV-2 Result: Detected: This Respiratory Panel uses polymerase chain reaction (PCR) to detect for  influenza A; influenza B; respiratory syncytial virus; and SARS-CoV-2.  This test was validated by [facility] and is in use under the FDA  Emergency Use Authorization (EUA) for clinical labs CLIA-certified to  perform high complexity testing. Test results should be correlated with  clinical presentation, patient history, and epidemiology.    Influenza A Result: NotDetec    Influenza B Result: NotDetec    Resp Syn Virus Result: NotDetec    < from: Xray Chest 1 View- PORTABLE-Urgent (Xray Chest 1 View- PORTABLE-Urgent .) (09.12.24 @ 14:13) >    Mild pulmonary venous congestion/perihilar interstitial edema, new. Right   perihilar and right lower lobe interstitial infiltrates again evident No   confluent consolidations. There is no pleural effusion or pneumothorax.  The heart is normal in size. Cardiomems.  The visualized osseous structures demonstrate no acute pathology.    IMPRESSION:  Mild pulmonary venous congestion/perihilar interstitial edema, new.    Right perihilar/lower lobe interstitial infiltrates again evident.

## 2024-09-12 NOTE — ED PROVIDER NOTE - IN ACCORDANCE WITH [place] STATE LAW, WE OFFER EVERY PATIENT A HEPATITIS C TEST. WOULD YOU LIKE TO BE TESTED TODAY?
Brief Operative Note    Patient: Mirian Turcios 75 year old female    MRN: [id number]    Surgeon(s): Gautam Marks MD  Phone Number: [phone]                       Surgeon(s) and Role:     * Gautam Marks MD - Primary    Assistant(s): none    Pre-Op Diagnosis: Endometrial mass [N94.89]     Post-Op Diagnosis: same no mass small polyp     Procedure: Procedure(s):  HYSTEROSCOPY WITH POLYPECTOMY    Anesthesia Type: General                                   Complications: None    Description: seee op note    Findings: small polyp on left lateral wall in lower segment and some scarring near it other wise atrophic lining     Specimens Removed:   ID Type Source Tests Collected by Time   A : Endometrial Polyp  Tissue Endometrium SURGICAL PATHOLOGY Gautam Marks MD 1/13/2022 6032        Estimated Blood Loss: minimal to nil     Assistant Tasks: None     Implants: * No implants in log *      I was present for the key portions of the procedure and was immediately available for the non-key portions       Previously negative

## 2024-09-12 NOTE — H&P ADULT - HISTORY OF PRESENT ILLNESS
History and Physical:   Outpatient Providers	dtr timothy    Language:  · Patient/Family of Limited English Proficiency	No      Patient Identity:  · Birth Sex	Male  · Patient's Sexual Orientation	Heterosexual  · Patient's Gender Identity	Male  · Patient's Preferred Pronoun	Him/He    History of Present Illness:  Reason for Admission: sob  History of Present Illness:   Date of Service: 09/12/2024 18:59           CARDIOLOGY    H & P  NOTE   ________________________________________________    HPI Objective Statement  64-year male past medical history ESRD MWF, CAD s/p stents, HTN, HLD, DM presents to ED for fatigue and muscle weakness x 5 days.  Patient COVID-positive at  advised to come to ED for IV remdesivir.  Completed dialysis yesterday.  Denies use of Tylenol for symptomatic relief.  Denies nausea, vomiting, chest pain, SOB, abdominal pain, urinary symptoms, change in bowel movement.  No recent travel or sick contacts.    HIV:   HIV Test Questions:  · In accordance with [place] State law, we offer every patient who comes to our ED an HIV test. Would you like to be tested today?	Previously Negative (within the last year)    PAST MEDICAL/SURGICAL/FAMILY/SOCIAL HISTORY:   Past Medical, Past Surgical, and Family History:  PAST MEDICAL HISTORY:  ASHD (arteriosclerotic heart disease)   BPH (benign prostatic hyperplasia)   CAD (coronary artery disease)   CKD (chronic kidney disease)   Diabetes Mellitus Type II, Uncontrolled   Diabetic retinopathy   Dyslipidemia   GERD (gastroesophageal reflux disease)   Gout   Hepatitis   HTN (hypertension)   Ischemic cardiomyopathy   Myocardial infarction   Nephrolithiasis   Pulmonary hypertension   s/p Angioplasty with Stent   Vitamin D deficiency.     PAST SURGICAL HISTORY:  CHF with cardiomyopathy Insertion of Cardiomems monitor    Diabetes with retinopathy S/P PPV/PRP/GAS  OD 2/22/17 for viterous hemorrhage  H/O lithotripsy   History of eye surgery left eye  Retinal Hemorrhage   S/P coronary artery stent placement 2010 TRICIA to Diag ; 11/18/2010  LAD; 2/4/11 ATOM liberte Diag; 5/15/2017  TRICIA to 1st diag; 6/7/17 PCI with laser and high pressure balloonREVIEW OF SYSTEMS:  CONSTITUTIONAL: No fever, weight loss, or fatigue  EYES: No eye pain, visual disturbances, or discharge  ENT:  No difficulty hearing, tinnitus, vertigo; No sinus or throat pain  NECK: No pain or stiffness  RESPIRATORY: No cough, wheezing, chills or hemoptysis; + Shortness of Breath  CARDIOVASCULAR: No chest pain, palpitations, passing out, dizziness, or leg swelling  GASTROINTESTINAL: No abdominal or epigastric pain. No nausea, vomiting, or hematemesis; No diarrhea or constipation. No melena or hematochezia.  GENITOURINARY: No dysuria, frequency, hematuria, or incontinence  NEUROLOGICAL: No headaches, memory loss, loss of strength, numbness, or tremors  SKIN: No itching, burning, rashes, or lesions   LYMPH Nodes: No enlarged glands  ENDOCRINE: No heat or cold intolerance; No hair loss  MUSCULOSKELETAL: No joint pain or swelling; No muscle, back, or extremity pain  PSYCHIATRIC: No depression, anxiety, mood swings, or difficulty sleeping  HEME/LYMPH: No easy bruising, or bleeding gums  ALLERGY AND IMMUNOLOGIC: No hives or eczema	    [ x] All others negative	  [ ] Unable to obtain    Tobacco Usage:  · Tobacco Usage	Unknown if ever smoked    ALLERGIES AND HOME MEDICATIONS:   Allergies:        Allergies:  	No Known Allergies:     Home Medications:   * Patient Currently Takes Medications as of 21-May-2024 19:05 documented in Structured Notes  · 	carvedilol 12.5 mg oral tablet: 0.5 tab(s) orally once a day (at bedtime) .5 tab night before dialysis  · 	valsartan 40 mg oral tablet: 1 tab(s) orally 2 times a day  · 	Senna 8.6 mg oral tablet: 2 tab(s) orally once a day (at bedtime)  · 	Zoloft 25 mg oral tablet: 1 tab(s) orally once a day  · 	ascorbic acid 500 mg oral tablet: 1 tab(s) orally once a day  · 	Tums Chewy Bites 750 mg oral tablet, chewable: 2 tab(s) chewed 3 times a day  · 	Trulicity Pen 0.75 mg/0.5 mL subcutaneous solution: 0.75 milligram(s) subcutaneously once a week  · 	melatonin 1 mg oral tablet: 3 tab(s) orally once a day (at bedtime)  · 	folic acid 1 mg oral tablet: 1 tab(s) orally once a day  · 	HumaLOG 100 units/mL subcutaneous solution: 12 unit(s) subcutaneous 3 times a day  · 	allopurinol 100 mg oral tablet: 1 tab(s) orally once a day  · 	famotidine 10 mg oral tablet: 1 tab(s) orally once a day  · 	aspirin 81 mg oral tablet: 1 tab(s) orally once a day  · 	gabapentin 300 mg oral tablet: 1 tablet with sensor orally once a day  · 	carvedilol 12.5 mg oral tablet: 1 tab(s) orally once a day on non-dialysis days  · 	atorvastatin 80 mg oral tablet: 1 tab(s) orally once a day (at bedtime)  · 	clopidogrel 75 mg oral tablet: 1 tab(s) orally once a day  · 	pantoprazole 40 mg oral delayed release tablet: 1 tab(s) orally once a day  · 	Toujeo SoloStar 300 units/mL subcutaneous solution: 24 unit(s) subcutaneous once a day (at bedtime)    PHYSICAL EXAM:  T(C): 37.6 )  09/12/2024 @ 18:02)  HR: 108 80 - 114)  BP: 161/76  168/70)  RR: 18 ( (18 - 21)  SpO2: 98% ) (96% - 98%)  Wt(kg): --  I&O's Summary      Appearance: Normal	  HEENT:   Normal oral mucosa, PERRL, EOMI	  Lymphatic: No lymphadenopathy  Cardiovascular: Normal S1 S2, No JVD, + murmurs, No edema  Respiratory: rhonchi  Psychiatry: A & O x 3, Mood & affect appropriate  Gastrointestinal:  Soft, Non-tender, + BS	  Skin: No rashes, No ecchymoses, No cyanosis	  Neurologic: can not asses  Extremities: Normal range of motion, No clubbing, cyanosis or edema  Vascular: Peripheral pulses palpable 2+ bilaterally    Complete Blood Count + Automated Diff (09.12.24 @ 14:20)    WBC Count: 10.58 K/uL   RBC Count: 3.27 M/uL   Hemoglobin: 9.9 g/dL   Hematocrit: 30.8 %   Mean Cell Volume: 94.2 fl   Mean Cell Hemoglobin: 30.3 pg   Mean Cell Hemoglobin Conc: 32.1 gm/dL   Red Cell Distrib Width: 14.0 %   Platelet Count - Automated: 157 K/uL  Comprehensive Metabolic Panel (09.12.24 @ 14:20)    Sodium: 138 mmol/L   Potassium: 3.8 mmol/L   Chloride: 97 mmol/L   Carbon Dioxide: 24 mmol/L   Anion Gap: 17 mmol/L   Blood Urea Nitrogen: 52 mg/dL   Creatinine: 8.51 mg/dL   Glucose: 360 mg/dL   Calcium: 8.5 mg/dL   Protein Total: 7.4 g/dL   Albumin: 4.0 g/dL   Bilirubin Total: 0.6 mg/dL   Alkaline Phosphatase: 78 U/L   Aspartate Aminotransferase (AST/SGOT): 13 U/L   Alanine Aminotransferase (ALT/SGPT): 9 U/L   eGFR: 6: The estimated glomerular filtration rate (eGFR) calculation is based on  the 2021 CKD-EPI creatinine equation, which is validated in male and  female population 18 years of age and older (N Engl J Med 2021;  [id number]). mL/min/1.73m2       Influenza B Result: NotDetec   Resp Syn Virus Result: NotDetec

## 2024-09-12 NOTE — ED ADULT NURSE NOTE - NSFALLRISKINTERV_ED_ALL_ED

## 2024-09-12 NOTE — ED PROVIDER NOTE - CLINICAL SUMMARY MEDICAL DECISION MAKING FREE TEXT BOX
Febrile tachycardic male presents to ED  for IV remdesivir per  provider as being found COVID-positive with complaints of fatigue, weakness and upper URI symptoms.  Physical exam notable for clear breath sounds bilaterally and saturating well on RA.  Amatory saturation stable.  No lower extreme edema or calf tenderness.  Ordered basic labs, lipase, flu/COVID, chest x-ray to rule out PNA versus viral URI versus electrolyte abnormality.  Given pain meds and remdesivir, will reassess.  Likely admit for further treatment.

## 2024-09-12 NOTE — H&P ADULT - NSICDXPASTMEDICALHX_GEN_ALL_CORE_FT
PAST MEDICAL HISTORY:  ASHD (arteriosclerotic heart disease)     BPH (benign prostatic hyperplasia)     CAD (coronary artery disease)     CKD (chronic kidney disease)     Diabetes Mellitus Type II, Uncontrolled     Diabetic retinopathy     Dyslipidemia     ESRD on dialysis     GERD (gastroesophageal reflux disease)     Gout     H/O diabetic neuropathy     H/O retinopathy     Hepatitis     Hernia, umbilical     HTN (hypertension)     Ischemic cardiomyopathy     Myocardial infarction     Nephrolithiasis     Pulmonary hypertension     s/p Angioplasty with Stent     Vitamin D deficiency

## 2024-09-12 NOTE — ED PROVIDER NOTE - PHYSICAL EXAMINATION
Gen: NAD, non-toxic appearing  Head: normal appearing  HEENT: normal conjunctiva, oral mucosa dry   Lung: no respiratory distress, speaking in full sentences, CTA b/l     CV: regular rate and rhythm, no murmurs, no le edema   Abd: soft, non distended, non tender   MSK: no visible deformities  Neuro: No focal deficits, AAOx3  Skin: Warm  Psych: normal affect

## 2024-09-12 NOTE — ED ADULT NURSE NOTE - OBJECTIVE STATEMENT
64y M PMH ESRD on dialysis MWF, DM, HTN, HLD, CAD s/p stents presents to the ED c/o generalized weakness. Pt reports +covid earlier today and was sent  to the ED by his MD for IV remdesivir. Last dialysis yesterday, completed full session. Pt febrile and coughing in the ED. Denies nvd, chest pain, abd pain, urinary symptoms. family at bedside. comfort and safety maintained.

## 2024-09-12 NOTE — ED PROVIDER NOTE - ATTENDING CONTRIBUTION TO CARE
63 y/o M hx esrd presents with covid, fatigue, weakness, sent in by outpatient provider for remdesevir. Patient feels very weak and difficulty getting out of bed 2/2 malaise.     [Const] appears fatigue/tired  [HEENT] PERRL, EOMI, moist mucus membranes  [Neck] Supple, trachea midline  [CV] +S1/S2, no m/r/g appreciated  [Lungs] Clear to auscultations bilaterally, no adventitious lung sounds  [Abd] soft, non-tender, nondistended in all 4 quadrants  [MSK] 5/5 upper extremity and lower extremity str bilaterally  [Skin] warm, dry, well-perfused  [Neuro] A&Ox3    Obtain labs, bcx, start remdesevir, give tylenol, supportive care    ddx likely covid vs viral vs bacteremia vs superimposed infection

## 2024-09-12 NOTE — ED PROVIDER NOTE - OBJECTIVE STATEMENT
64-year male past medical history ESRD MWF, CAD s/p stents, HTN, HLD, DM presents to ED for fatigue and muscle weakness x 5 days.  Patient COVID-positive at  advised to come to ED for IV remdesivir.  Completed dialysis yesterday.  Denies use of Tylenol for symptomatic relief.  Denies nausea, vomiting, chest pain, SOB, abdominal pain, urinary symptoms, change in bowel movement.  No recent travel or sick contacts.

## 2024-09-12 NOTE — ED ADULT TRIAGE NOTE - CHIEF COMPLAINT QUOTE
Pt endorsing feeling fatigued, weakness since Sunday,   Tested Positive for COVID today, sent in by PMD for Remdesivir   history of ESRD on HD MWF

## 2024-09-13 LAB
A1C WITH ESTIMATED AVERAGE GLUCOSE RESULT: 10.2 % — HIGH (ref 4–5.6)
ALBUMIN SERPL ELPH-MCNC: 3.6 G/DL — SIGNIFICANT CHANGE UP (ref 3.3–5)
ALP SERPL-CCNC: 72 U/L — SIGNIFICANT CHANGE UP (ref 40–120)
ALT FLD-CCNC: 9 U/L — LOW (ref 10–45)
ANION GAP SERPL CALC-SCNC: 20 MMOL/L — HIGH (ref 5–17)
AST SERPL-CCNC: 10 U/L — SIGNIFICANT CHANGE UP (ref 10–40)
BILIRUB SERPL-MCNC: 0.4 MG/DL — SIGNIFICANT CHANGE UP (ref 0.2–1.2)
BUN SERPL-MCNC: 61 MG/DL — HIGH (ref 7–23)
CALCIUM SERPL-MCNC: 8.2 MG/DL — LOW (ref 8.4–10.5)
CHLORIDE SERPL-SCNC: 97 MMOL/L — SIGNIFICANT CHANGE UP (ref 96–108)
CO2 SERPL-SCNC: 21 MMOL/L — LOW (ref 22–31)
CREAT SERPL-MCNC: 9.28 MG/DL — HIGH (ref 0.5–1.3)
EGFR: 6 ML/MIN/1.73M2 — LOW
ESTIMATED AVERAGE GLUCOSE: 246 MG/DL — HIGH (ref 68–114)
GLUCOSE BLDC GLUCOMTR-MCNC: 269 MG/DL — HIGH (ref 70–99)
GLUCOSE BLDC GLUCOMTR-MCNC: 278 MG/DL — HIGH (ref 70–99)
GLUCOSE BLDC GLUCOMTR-MCNC: 339 MG/DL — HIGH (ref 70–99)
GLUCOSE BLDC GLUCOMTR-MCNC: 342 MG/DL — HIGH (ref 70–99)
GLUCOSE SERPL-MCNC: 270 MG/DL — HIGH (ref 70–99)
HBV SURFACE AG SER-ACNC: SIGNIFICANT CHANGE UP
HCT VFR BLD CALC: 31.3 % — LOW (ref 39–50)
HGB BLD-MCNC: 10 G/DL — LOW (ref 13–17)
MCHC RBC-ENTMCNC: 31 PG — SIGNIFICANT CHANGE UP (ref 27–34)
MCHC RBC-ENTMCNC: 31.9 GM/DL — LOW (ref 32–36)
MCV RBC AUTO: 96.9 FL — SIGNIFICANT CHANGE UP (ref 80–100)
NRBC # BLD: 0 /100 WBCS — SIGNIFICANT CHANGE UP (ref 0–0)
PLATELET # BLD AUTO: 178 K/UL — SIGNIFICANT CHANGE UP (ref 150–400)
POTASSIUM SERPL-MCNC: 3.5 MMOL/L — SIGNIFICANT CHANGE UP (ref 3.5–5.3)
POTASSIUM SERPL-SCNC: 3.5 MMOL/L — SIGNIFICANT CHANGE UP (ref 3.5–5.3)
PROT SERPL-MCNC: 6.7 G/DL — SIGNIFICANT CHANGE UP (ref 6–8.3)
RBC # BLD: 3.23 M/UL — LOW (ref 4.2–5.8)
RBC # FLD: 13.9 % — SIGNIFICANT CHANGE UP (ref 10.3–14.5)
SODIUM SERPL-SCNC: 138 MMOL/L — SIGNIFICANT CHANGE UP (ref 135–145)
WBC # BLD: 10.87 K/UL — HIGH (ref 3.8–10.5)
WBC # FLD AUTO: 10.87 K/UL — HIGH (ref 3.8–10.5)

## 2024-09-13 PROCEDURE — 71250 CT THORAX DX C-: CPT | Mod: 26

## 2024-09-13 RX ORDER — EPOETIN ALFA 3000 [IU]/ML
10000 SOLUTION INTRAVENOUS; SUBCUTANEOUS ONCE
Refills: 0 | Status: COMPLETED | OUTPATIENT
Start: 2024-09-13 | End: 2024-09-18

## 2024-09-13 RX ADMIN — Medication 3: at 12:09

## 2024-09-13 RX ADMIN — Medication 100 MILLIGRAM(S): at 11:22

## 2024-09-13 RX ADMIN — VALSARTAN 40 MILLIGRAM(S): 40 TABLET ORAL at 06:33

## 2024-09-13 RX ADMIN — Medication 81 MILLIGRAM(S): at 11:22

## 2024-09-13 RX ADMIN — CARVEDILOL 6.25 MILLIGRAM(S): 6.25 TABLET ORAL at 17:03

## 2024-09-13 RX ADMIN — Medication 4: at 17:03

## 2024-09-13 RX ADMIN — REMDESIVIR 200 MILLIGRAM(S): 5 INJECTION INTRAVENOUS at 16:59

## 2024-09-13 RX ADMIN — SERTRALINE HYDROCHLORIDE 25 MILLIGRAM(S): 50 TABLET, FILM COATED ORAL at 11:22

## 2024-09-13 RX ADMIN — Medication 100 MILLIGRAM(S): at 14:34

## 2024-09-13 RX ADMIN — Medication 3: at 07:56

## 2024-09-13 RX ADMIN — CARVEDILOL 6.25 MILLIGRAM(S): 6.25 TABLET ORAL at 06:32

## 2024-09-13 RX ADMIN — Medication 5000 UNIT(S): at 06:32

## 2024-09-13 RX ADMIN — Medication 40 MILLIGRAM(S): at 06:32

## 2024-09-13 RX ADMIN — Medication 1 MILLIGRAM(S): at 11:22

## 2024-09-13 RX ADMIN — Medication 5000 UNIT(S): at 21:10

## 2024-09-13 RX ADMIN — Medication 2: at 22:54

## 2024-09-13 RX ADMIN — Medication 75 MILLIGRAM(S): at 11:22

## 2024-09-13 RX ADMIN — Medication 5000 UNIT(S): at 16:13

## 2024-09-13 RX ADMIN — Medication 80 MILLIGRAM(S): at 21:10

## 2024-09-13 NOTE — CONSULT NOTE ADULT - SUBJECTIVE AND OBJECTIVE BOX
HPI:  History and Physical  64-year male            past medical history ESRD MWF, CAD s/p stents, HTN, HLD, DM        presents to ED for fatigue and muscle weakness x 5 days.          Patient COVID-positive at  advised to come to ED for IV remdesivir.        Completed dialysis      .  Denies use of Tylenol for symptomatic relief.  Denies nausea, vomiting, chest pain, SOB, abdominal pain, urinary symptoms, change in bowel movement.     No recent travel or sick contacts.    HIV:   HIV Test Questions:  · In accordance with [place] State law, we offer every patient who comes to our ED an HIV test. Would you like to be tested today?	Previously Negative (within the last year)    PAST MEDICAL/SURGICAL/FAMILY/SOCIAL HISTORY:   Past Medical, Past Surgical, and Family History:  PAST MEDICAL HISTORY:  ASHD (arteriosclerotic heart disease)   BPH (benign prostatic hyperplasia)   CAD (coronary artery disease)   CKD (chronic kidney disease)   Diabetes Mellitus Type II, Uncontrolled   Diabetic retinopathy   Dyslipidemia   GERD (gastroesophageal reflux disease)   Gout   Hepatitis   HTN (hypertension)   Ischemic cardiomyopathy   Myocardial infarction   Nephrolithiasis   Pulmonary hypertension   s/p Angioplasty with Stent   Vitamin D deficiency.     PAST SURGICAL HISTORY:  CHF with cardiomyopathy Insertion of Cardiomems monitor    Diabetes with retinopathy S/P PPV/PRP/GAS  OD 2/22/17 for viterous hemorrhage  H/O lithotripsy   History of eye surgery left eye  Retinal Hemorrhage   S/P coronary artery stent placement 2010 TRICIA to Diag ; 11/18/2010  LAD; 2/4/11 ATOM liberte Diag; 5/15/2017  TRICIA to 1st diag; 6/7/17 PCI with laser and high pressure balloonREVIEW OF SYSTEMS:  CONSTITUTIONAL: No fever, weight loss, or fatigue  EYES: No eye pain, visual disturbances, or discharge  ENT:  No difficulty hearing, tinnitus, vertigo; No sinus or throat pain  NECK: No pain or stiffness  RESPIRATORY: No cough, wheezing, chills or hemoptysis; + Shortness of Breath  CARDIOVASCULAR: No chest pain, palpitations, passing out, dizziness, or leg swelling  GASTROINTESTINAL: No abdominal or epigastric pain. No nausea, vomiting, or hematemesis; No diarrhea or constipation. No melena or hematochezia.  GENITOURINARY: No dysuria, frequency, hematuria, or incontinence  NEUROLOGICAL: No headaches, memory loss, loss of strength, numbness, or tremors  SKIN: No itching, burning, rashes, or lesions   LYMPH Nodes: No enlarged glands  ENDOCRINE: No heat or cold intolerance; No hair loss  MUSCULOSKELETAL: No joint pain or swelling; No muscle, back, or extremity pain  PSYCHIATRIC: No depression, anxiety, mood swings, or difficulty sleeping  HEME/LYMPH: No easy bruising, or bleeding gums  ALLERGY AND IMMUNOLOGIC: No hives or eczema	    [ x] All others negative	  [ ] Unable to obtain    Tobacco Usage:  · Tobacco Usage	Unknown if ever smoked    ALLERGIES AND HOME MEDICATIONS:   Allergies:        Allergies:  	No Known Allergies:     Home Medications:   * Patient Currently Takes Medications as of 21-May-2024 19:05 documented in Structured Notes  · 	carvedilol 12.5 mg oral tablet: 0.5 tab(s) orally once a day (at bedtime) .5 tab night before dialysis  · 	valsartan 40 mg oral tablet: 1 tab(s) orally 2 times a day  · 	Senna 8.6 mg oral tablet: 2 tab(s) orally once a day (at bedtime)  · 	Zoloft 25 mg oral tablet: 1 tab(s) orally once a day  · 	ascorbic acid 500 mg oral tablet: 1 tab(s) orally once a day  · 	Tums Chewy Bites 750 mg oral tablet, chewable: 2 tab(s) chewed 3 times a day  · 	Trulicity Pen 0.75 mg/0.5 mL subcutaneous solution: 0.75 milligram(s) subcutaneously once a week  · 	melatonin 1 mg oral tablet: 3 tab(s) orally once a day (at bedtime)  · 	folic acid 1 mg oral tablet: 1 tab(s) orally once a day  · 	HumaLOG 100 units/mL subcutaneous solution: 12 unit(s) subcutaneous 3 times a day  · 	allopurinol 100 mg oral tablet: 1 tab(s) orally once a day  · 	famotidine 10 mg oral tablet: 1 tab(s) orally once a day  · 	aspirin 81 mg oral tablet: 1 tab(s) orally once a day  · 	gabapentin 300 mg oral tablet: 1 tablet with sensor orally once a day  · 	carvedilol 12.5 mg oral tablet: 1 tab(s) orally once a day on non-dialysis days  · 	atorvastatin 80 mg oral tablet: 1 tab(s) orally once a day (at bedtime)  · 	clopidogrel 75 mg oral tablet: 1 tab(s) orally once a day  · 	pantoprazole 40 mg oral delayed release tablet: 1 tab(s) orally once a day  · 	Toujeo SoloStar 300 units/mL subcutaneous solution: 24 unit(s) subcutaneous once a day (at bedtime)    PHYSICAL EXAM:  T(C): 37.6 )  09/12/2024 @ 18:02)  HR: 108 80 - 114)  BP: 161/76  168/70)  RR: 18 ( (18 - 21)  SpO2: 98% ) (96% - 98%)  Wt(kg): --  I&O's Summary      Appearance: Normal	  HEENT:   Normal oral mucosa, PERRL, EOMI	  Lymphatic: No lymphadenopathy  Cardiovascular: Normal S1 S2, No JVD, + murmurs, No edema  Respiratory: rhonchi  Psychiatry: A & O x 3, Mood & affect appropriate  Gastrointestinal:  Soft, Non-tender, + BS	  Skin: No rashes, No ecchymoses, No cyanosis	  Neurologic: can not asses  Extremities: Normal range of motion, No clubbing, cyanosis or edema  Vascular: Peripheral pulses palpable 2+ bilaterally    Complete Blood Count + Automated Diff (09.12.24 @ 14:20)    WBC Count: 10.58 K/uL   RBC Count: 3.27 M/uL   Hemoglobin: 9.9 g/dL   Hematocrit: 30.8 %   Mean Cell Volume: 94.2 fl   Mean Cell Hemoglobin: 30.3 pg   Mean Cell Hemoglobin Conc: 32.1 gm/dL   Red Cell Distrib Width: 14.0 %   Platelet Count - Automated: 157 K/uL  Comprehensive Metabolic Panel (09.12.24 @ 14:20)    Sodium: 138 mmol/L   Potassium: 3.8 mmol/L   Chloride: 97 mmol/L   Carbon Dioxide: 24 mmol/L   Anion Gap: 17 mmol/L   Blood Urea Nitrogen: 52 mg/dL   Creatinine: 8.51 mg/dL   Glucose: 360 mg/dL   Calcium: 8.5 mg/dL   Protein Total: 7.4 g/dL   Albumin: 4.0 g/dL   Bilirubin Total: 0.6 mg/dL   Alkaline Phosphatase: 78 U/L   Aspartate Aminotransferase (AST/SGOT): 13 U/L   Alanine Aminotransferase (ALT/SGPT): 9 U/L   eGFR: 6: The estimated glomerular filtration rate (eGFR) calculation is based on  the 2021 CKD-EPI creatinine equation, which is validated in male and  female population 18 years of age and older (N Engl J Med 2021;  [id number]). mL/min/1.73m2       Influenza B Result: NotDetec   Resp Syn Virus Result: NotDetec         (12 Sep 2024 18:58)    Date of service: 09-13-24 @ 09:55    REVIEW OF SYSTEMS:  CONSTITUTIONAL: No fever,  no  weight loss  ENT:  No  tinnitus,   no   vertigo  NECK: No pain or stiffness  RESPIRATORY: No cough, wheezing, chills or hemoptysis;    No Shortness of Breath  CARDIOVASCULAR: No chest pain, palpitations, dizziness  GASTROINTESTINAL: No abdominal or epigastric pain. No nausea, vomiting, or hematemesis; No diarrhea  No melena or hematochezia.  GENITOURINARY: No dysuria, frequency, hematuria, or incontinence  NEUROLOGICAL: No headaches  SKIN: No itching,  no   rash  LYMPH Nodes: No enlarged glands  ENDOCRINE: No heat or cold intolerance  MUSCULOSKELETAL: No joint pain or swelling  PSYCHIATRIC: No depression, anxiety  HEME/LYMPH: No easy bruising, or bleeding gums  ALLERGY AND IMMUNOLOGIC: No hives or eczema	    Allergies    No Known Allergies    Intolerances        CAPILLARY BLOOD GLUCOSE      POCT Blood Glucose.: 269 mg/dL (13 Sep 2024 07:27)  POCT Blood Glucose.: 318 mg/dL (12 Sep 2024 21:47)  POCT Blood Glucose.: 322 mg/dL (12 Sep 2024 17:57)    I&O's Summary      Vital Signs Last 24 Hrs  T(C): 36.6 (13 Sep 2024 04:35), Max: 38.8 (12 Sep 2024 14:00)  T(F): 97.9 (13 Sep 2024 04:35), Max: 101.8 (12 Sep 2024 14:00)  HR: 97 (13 Sep 2024 04:35) (88 - 108)  BP: 152/81 (13 Sep 2024 04:35) (149/68 - 177/93)  BP(mean): 99 (12 Sep 2024 16:50) (99 - 99)  RR: 18 (13 Sep 2024 04:35) (16 - 20)  SpO2: 97% (13 Sep 2024 04:35) (94% - 98%)    Parameters below as of 13 Sep 2024 04:35  Patient On (Oxygen Delivery Method): room air        PHYSICAL EXAM:  Appearance: Normal	  HEENT:   Normal oral mucosa, PERRL, EOMI	  Lymphatic: No lymphadenopathy  Cardiovascular: Normal S1 S2, No JVD  Respiratory: Lungs clear to auscultation	  Psychiatry:    Mood & affect appropriate  Gastrointestinal:  Soft, Non-tender, + BS	  Skin: No rash, No ecchymoses	  Extremities: Normal range of motion  Vascular: Peripheral pulses palpable bilaterally    MEDICATIONS  (STANDING):  allopurinol 100 milliGRAM(s) Oral daily  aspirin enteric coated 81 milliGRAM(s) Oral daily  atorvastatin 80 milliGRAM(s) Oral at bedtime  carvedilol 6.25 milliGRAM(s) Oral every 12 hours  clopidogrel Tablet 75 milliGRAM(s) Oral daily  dextrose 5%. 1000 milliLiter(s) (50 mL/Hr) IV Continuous <Continuous>  dextrose 5%. 1000 milliLiter(s) (100 mL/Hr) IV Continuous <Continuous>  dextrose 50% Injectable 25 Gram(s) IV Push once  dextrose 50% Injectable 25 Gram(s) IV Push once  dextrose 50% Injectable 12.5 Gram(s) IV Push once  epoetin wayne-epbx (RETACRIT) Injectable 59823 Unit(s) IV Push once  folic acid 1 milliGRAM(s) Oral daily  gabapentin 100 milliGRAM(s) Oral daily  glucagon  Injectable 1 milliGRAM(s) IntraMuscular once  heparin   Injectable 5000 Unit(s) SubCutaneous every 8 hours  insulin lispro (ADMELOG) corrective regimen sliding scale   SubCutaneous at bedtime  insulin lispro (ADMELOG) corrective regimen sliding scale   SubCutaneous three times a day before meals  pantoprazole    Tablet 40 milliGRAM(s) Oral before breakfast  remdesivir  IVPB 100 milliGRAM(s) IV Intermittent every 24 hours  remdesivir  IVPB   IV Intermittent   senna 2 Tablet(s) Oral at bedtime  sertraline 25 milliGRAM(s) Oral daily  valsartan 40 milliGRAM(s) Oral daily    MEDICATIONS  (PRN):  dextrose Oral Gel 15 Gram(s) Oral once PRN Blood Glucose LESS THAN 70 milliGRAM(s)/deciliter    LABS:                        10.0   10.87 )-----------( 178      ( 13 Sep 2024 07:17 )             31.3     09-13    138  |  97  |  61<H>  ----------------------------<  270<H>  3.5   |  21<L>  |  9.28<H>    Ca    8.2<L>      13 Sep 2024 07:15    TPro  6.7  /  Alb  3.6  /  TBili  0.4  /  DBili  x   /  AST  10  /  ALT  9<L>  /  AlkPhos  72  09-13          Urinalysis Basic - ( 13 Sep 2024 07:15 )    Color: x / Appearance: x / SG: x / pH: x  Gluc: 270 mg/dL / Ketone: x  / Bili: x / Urobili: x   Blood: x / Protein: x / Nitrite: x   Leuk Esterase: x / RBC: x / WBC x   Sq Epi: x / Non Sq Epi: x / Bacteria: x          09-12 @ 13:54  3.8  23              Consultant(s) Notes Reviewed:      Care Discussed with Consultants/Other Providers:  Plan:

## 2024-09-13 NOTE — CONSULT NOTE ADULT - ASSESSMENT
64-year male past medical history ESRD MWF, CAD s/p stents, HTN, HLD, DM presents with COVID  Mild heart failure  64-year male past medical history ESRD MWF, CAD s/p stents, HTN, HLD, DM presents with COVID  Mild heart failure     1 Renal- HD today and will try for 2.8 kg and Retcrit at hd  Check Phos and that was added to todays labs   2 ID-Remdesivir started and at present not on steroids (but no renal objection)  3CVS- BP is stable;  Defer to Cards re repeat the echo       Sayed [facility]   [phone]

## 2024-09-13 NOTE — CONSULT NOTE ADULT - ASSESSMENT
64   yr      hx CAD   s/p  2 drug eluding stents 11/2022       CHF, DM, gout, ESRD on dialysis      c/c  weakness  of   limbs/  neuro dr mccain/ h/o   gout, right  wrist,  has  functional  quadriplegia/  CT  head, . 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      pt  with  h/o intermittent  right  arm /leg weakness  has been   c/c  for  yrs/  with  unclear  etiology / MRI  head/  c spine in 10/22,  no cord  compression       seen by neuro in  past,   per  wife,  thinks  pt  has  a psychological  component  regarding  his  weakness/  s/p Strep pneumoniae  bacteremia  and pna           now admitted    with sob/ from covid/  fluid  overload          covid/  cxr  . infiltrates , on iv remdisivir     Gout     CAD,    s/p    pci.        on  asa/ plavix/   lipitor,       Echo,  ef  45. on 5/.23     CKD, on HD / c/c  anemia           renal  dr dickerson      c/c  anemia     DM,  follow  fs           on lantus/  endo  f/p     on  dvt ppx/ gabapentin,  asa. / plavix,  lipitor,  coreg., valsartan,  lantus,  zolofr. allopurinol          rad< from: Xray Chest 1 View- PORTABLE-Urgent (Xray Chest 1 View- PORTABLE-Urgent .) (09.12.24 @ 14:13) >  MPRESSION:  Mild pulmonary venous congestion/perihilar interstitial edema, new.  Right perihilar/lower lobe interstitial infiltrates again evident.  --- End of Report ---

## 2024-09-13 NOTE — CONSULT NOTE ADULT - SUBJECTIVE AND OBJECTIVE BOX
NEPHROLOGY - NSN    Patient seen and examined.    HPI:        64-year male past medical history ESRD MWF, CAD s/p stents, HTN, HLD, DM presents to ED for fatigue and muscle weakness x 5 days.  Patient COVID-positive at  advised to come to ED for IV remdesivir.  Completed dialysis yesterday.  Denies use of Tylenol for symptomatic relief.  Denies nausea, vomiting, chest pain, SOB, abdominal pain, urinary symptoms, change in bowel movement.  No recent travel or sick contacts.  + COVID   HD MWF at [facility] Medications:   * Patient Currently Takes Medications as of 21-May-2024 19:05 documented in Structured Notes  · 	carvedilol 12.5 mg oral tablet: 0.5 tab(s) orally once a day (at bedtime) .5 tab night before dialysis  · 	valsartan 40 mg oral tablet: 1 tab(s) orally 2 times a day  · 	Senna 8.6 mg oral tablet: 2 tab(s) orally once a day (at bedtime)  · 	Zoloft 25 mg oral tablet: 1 tab(s) orally once a day  · 	ascorbic acid 500 mg oral tablet: 1 tab(s) orally once a day  · 	Tums Chewy Bites 750 mg oral tablet, chewable: 2 tab(s) chewed 3 times a day  · 	Trulicity Pen 0.75 mg/0.5 mL subcutaneous solution: 0.75 milligram(s) subcutaneously once a week  · 	melatonin 1 mg oral tablet: 3 tab(s) orally once a day (at bedtime)  · 	folic acid 1 mg oral tablet: 1 tab(s) orally once a day  · 	HumaLOG 100 units/mL subcutaneous solution: 12 unit(s) subcutaneous 3 times a day  · 	allopurinol 100 mg oral tablet: 1 tab(s) orally once a day  · 	famotidine 10 mg oral tablet: 1 tab(s) orally once a day  · 	aspirin 81 mg oral tablet: 1 tab(s) orally once a day  · 	gabapentin 300 mg oral tablet: 1 tablet with sensor orally once a day  · 	carvedilol 12.5 mg oral tablet: 1 tab(s) orally once a day on non-dialysis days  · 	atorvastatin 80 mg oral tablet: 1 tab(s) orally once a day (at bedtime)  · 	clopidogrel 75 mg oral tablet: 1 tab(s) orally once a day  · 	pantoprazole 40 mg oral delayed release tablet: 1 tab(s) orally once a day  · 	Toujeo SoloStar 300 units/mL subcutaneous solution: 24 unit(s) subcutaneous once a day (at bedtime)            PAST MEDICAL & SURGICAL HISTORY:  Diabetes Mellitus Type II, Uncontrolled      Dyslipidemia      s/p Angioplasty with Stent      HTN (hypertension)      Gout      Diabetic retinopathy      GERD (gastroesophageal reflux disease)      Nephrolithiasis      Vitamin D deficiency      Hepatitis      CKD (chronic kidney disease)      Ischemic cardiomyopathy      ASHD (arteriosclerotic heart disease)      Pulmonary hypertension      Myocardial infarction      CAD (coronary artery disease)      BPH (benign prostatic hyperplasia)      ESRD on dialysis      H/O retinopathy      Hernia, umbilical      H/O diabetic neuropathy      S/P coronary artery stent placement  2010 TRICIA to Diag ; 11/18/2010  LAD; 2/4/11 ATOM liberte Diag; 5/15/2017  TRICIA to 1st diag; 6/7/17 PCI with laser and high pressure balloon      History of eye surgery  left eye      H/O lithotripsy      AV fistula      S/P foot surgery, left          MEDICATIONS  (STANDING):  allopurinol 100 milliGRAM(s) Oral daily  aspirin enteric coated 81 milliGRAM(s) Oral daily  atorvastatin 80 milliGRAM(s) Oral at bedtime  carvedilol 6.25 milliGRAM(s) Oral every 12 hours  clopidogrel Tablet 75 milliGRAM(s) Oral daily  dextrose 5%. 1000 milliLiter(s) (50 mL/Hr) IV Continuous <Continuous>  dextrose 5%. 1000 milliLiter(s) (100 mL/Hr) IV Continuous <Continuous>  dextrose 50% Injectable 25 Gram(s) IV Push once  dextrose 50% Injectable 12.5 Gram(s) IV Push once  dextrose 50% Injectable 25 Gram(s) IV Push once  folic acid 1 milliGRAM(s) Oral daily  gabapentin 100 milliGRAM(s) Oral daily  glucagon  Injectable 1 milliGRAM(s) IntraMuscular once  heparin   Injectable 5000 Unit(s) SubCutaneous every 8 hours  insulin lispro (ADMELOG) corrective regimen sliding scale   SubCutaneous at bedtime  insulin lispro (ADMELOG) corrective regimen sliding scale   SubCutaneous three times a day before meals  pantoprazole    Tablet 40 milliGRAM(s) Oral before breakfast  remdesivir  IVPB 100 milliGRAM(s) IV Intermittent every 24 hours  remdesivir  IVPB   IV Intermittent   senna 2 Tablet(s) Oral at bedtime  sertraline 25 milliGRAM(s) Oral daily  valsartan 40 milliGRAM(s) Oral daily      Allergies    No Known Allergies    Intolerances        SOCIAL HISTORY:  Denies alcohol abuse, drug abuse or tobacco usage.     FAMILY HISTORY:  Family history of cardiac disorder in father (Father)        VITALS:  T(C): 36.6 (09-13-24 @ 04:35), Max: 38.8 (09-12-24 @ 14:00)  HR: 97 (09-13-24 @ 04:35) (88 - 108)  BP: 152/81 (09-13-24 @ 04:35) (149/68 - 177/93)  RR: 18 (09-13-24 @ 04:35) (16 - 20)  SpO2: 97% (09-13-24 @ 04:35) (94% - 98%)    REVIEW OF SYSTEMS:  Denies any nausea, vomiting, diarrhea, fever or chills.+   weakness; no hematuria or dysuria. + fatigue or weakness. All other pertinent systems are reviewed and are negative.    PHYSICAL EXAM:  Constitutional: NAD  HEENT: EOMI  Neck:  No JVD, supple   Respiratory: CTA B/L  Cardiovascular: S1 and S2, RRR  Gastrointestinal: + BS, soft, NT, ND  Extremities: No peripheral edema, + peripheral pulses  Neurological: A/O x 3, CN2-12 intact  Psychiatric: Normal mood, normal affect  : No Jay  Skin: No rashes, C/D/I  Access: Not applicable    I and O's:    Height (cm): 170.2 (09-12 @ 11:50)  Weight (kg): 87.5 (09-12 @ 11:50)  BMI (kg/m2): 30.2 (09-12 @ 11:50)  BSA (m2): 1.99 (09-12 @ 11:50)    LABS:                        10.0   10.87 )-----------( 178      ( 13 Sep 2024 07:17 )             31.3     09-13    138  |  97  |  61<H>  ----------------------------<  270<H>  3.5   |  21<L>  |  9.28<H>    Ca    8.2<L>      13 Sep 2024 07:15    TPro  6.7  /  Alb  3.6  /  TBili  0.4  /  DBili  x   /  AST  10  /  ALT  9<L>  /  AlkPhos  72  09-13      URINE:  Urinalysis Basic - ( 13 Sep 2024 07:15 )    Color: x / Appearance: x / SG: x / pH: x  Gluc: 270 mg/dL / Ketone: x  / Bili: x / Urobili: x   Blood: x / Protein: x / Nitrite: x   Leuk Esterase: x / RBC: x / WBC x   Sq Epi: x / Non Sq Epi: x / Bacteria: x        RADIOLOGY & ADDITIONAL STUDIES:    < from: Xray Chest 1 View- PORTABLE-Urgent (Xray Chest 1 View- PORTABLE-Urgent .) (09.12.24 @ 14:13) >    ACC: [id number] EXAM:  XR CHEST PORTABLE URGENT 1V   ORDERED BY:  BHAVIK GERBER     PROCEDURE DATE:  09/12/2024          INTERPRETATION:  CLINICAL INDICATION: Evaluate for pneumonia    EXAM: Frontal radiograph of the chest.    COMPARISON: Chest radiograph from 11/23/2023.    FINDINGS:  Mild pulmonary venous congestion/perihilar interstitial edema, new. Right   perihilar and right lower lobe interstitial infiltrates again evident No   confluent consolidations. There is no pleural effusion or pneumothorax.  The heart is normal in size. Cardiomems.  The visualized osseous structures demonstrate no acute pathology.    IMPRESSION:  Mild pulmonary venous congestion/perihilar interstitial edema, new.    Right perihilar/lower lobe interstitial infiltrates again evident.    --- End of Report ---          MORALES INGRAM MD; Resident Radiologist  This document has been electronically signed.  ARISTIDES VILLELA DO; Attending Radiologist  This document has been electronically signed. Sep 12 2024  2:49PM    < end of copied text >  < from: TTE W or WO Ultrasound Enhancing Agent (05.24.23 @ 12:00) >    TRANSTHORACIC ECHOCARDIOGRAM REPORT  ________________________________________________________________________________                                      _______       Pt. Name:       ADAM KOWALSKI Study Date:    5/24/2023  MRN: [id number]             YOB: 1960  Accession #:    [id number]              Age:           63 years  Account#:       [id number]           Gender:        M  Heart Rate:                            Height:        67.00 in (170.18 cm)  Rhythm:                                Weight:        180.00 lb (81.65 kg)  Blood Pressure: 117/74 mmHg            BSA/BMI:       1.93 m² / 28.19 kg/m²  ________________________________________________________________________________________  Referring Physician: [id number] Lesly Leonard  Primary Sonographer: Cesar Waters RDCS    CPT:               ECHO TTE WO CON COMP W DOPP - 46365.m  Indication(s):     Syncope and collapse - R55  Procedure:         Transthoracic echocardiogram with 2-D, M-mode and complete                     spectral and color flow Doppler.  Ordering Location: [facility]  Admission Status:  Inpatient  Study Information: Image quality for this study is poor.    _______________________________________________________________________________________  CONCLUSIONS:      1. The left ventricular systolic function is mildly decreased with an ejection fraction visually estimated at 45 to 50 %. There are regional wall motion abnormalities.   2. Mid and apical anterior septum and apex are abnormal.   3. Normal systolic function. The tricuspid annular plane systolic excursion (TAPSE) is 1.8 cm (normal >=1.7 cm).   4. No prior echocardiogram is available for comparison.    ________________________________________________________________________________________  FINDINGS:     Left Ventricle:  The left ventricular wall thickness is mildly increased. The left ventricular systolic function is mildly decreased with an ejection fraction visually estimated at 45 to 50%. There are regionalwall motion abnormalities consistent with ischemic heart disease.  LV Wall Scoring: The mid and apical anterior septum and apex are hypokinetic.  All remaining scored segments are normal.          Right Ventricle:  Normal right ventricular cavity size, normal wall thickness and normal systolic function. Tricuspid annular plane systolic excursion (TAPSE) is 1.8 cm (normal >=1.7 cm).     Left Atrium:  The left atrium is mildly dilated.     Right Atrium:  The right atrium is normal with an indexed volume of 21.72 ml/m².     Aortic Valve:  The aortic valve was not well visualized. The aortic valve appears trileaflet with normal systolic excursion. There is fibrocalcific aortic valve sclerosis without stenosis. There is trace aortic regurgitation.     Mitral Valve:  Structurally normal mitral valve with normal leaflet opening and closing, without any evidence of mitral stenosis or significant regurgitation. There is mild calcification of the mitral valve annulus. There is mild mitral regurgitation.     Tricuspid Valve:  The tricuspid valve is normal in structure and function, with good leaflet excursion, and without any evidence of stenosis or significant regurgitation. Structurally normal tricuspid valve with normal leaflet excursion. There is mild tricuspid regurgitation. Estimated pulmonary artery systolic pressure is 28 mmHg.     Pulmonic Valve:  The pulmonary valve is normal in structure and function, with good leaflet excursion, and without any evidence of pulmonary stenosis or significant regurgitation. Structurally normal pulmonic valve with normal leaflet excursion. There is trace pulmonic regurgitation.     Aorta:  Normal aorta sinus of Valsalva, measuring 3.30 cm (indexed 1.71 cm/m²).     Pericardium:  No pericardial effusion seen.     Systemic Veins:  The inferior vena cava is normal (normal <2.1cm) with normal inspiratory collapse (normal >50%) consistent with normal right atrial pressure (~3, range 0-5mmHg).  ____________________________________________________________________  QUANTITATIVE DATA  Left Ventricle Measurements                         Indexed BSA  IVSd (2D):   1.2 cm  LVPWd (2D):  1.3 cm  LVIDd (2D):  4.7 cm  LVIDs (2D):  3.8 cm  LV Mass:     217 g  112.1 g/m²  Visualized LV EF%: 45 to 50%     MV E Vmax:    1.28 m/s  MV A Vmax:    0.45 m/s  MV E/A:       2.87  e' lateral:   11.40 cm/s  e' medial:    5.55 cm/s  E/e' lateral: 11.23  E/e' medial:  23.06  E/e' Average: 15.10    Aorta Measurements     Ao Sinus: 3.3 cm       Left Atrium Measurements     LA Diam 2D: 5.00 cm    Right Ventricle Measurements Right Atrial Measurements     TAPSE:      1.8 cm           RA Vol:       42.00 ml  TV Staci. S': 8.81 cm/s        RA Vol Index: 21.72 ml/m²       LVOT / RVOT/ Qp/Qs Data:  LVOT Diameter:   2.00 cm  LVOT Vmax:       0.96 m/s  LVOT VTI:        17.50 cm  LVOT SV:         55.0 ml  LVOT SV Indexed: 28.43 ml/m²    Mitral Valve Measurements     MV E Vmax: 1.3 m/s  MV A Vmax: 0.4 m/s  MV E/A:    2.9       Tricuspid Valve Measurements     TR Vmax:    2.5 m/s  TR Peak Gradient: 25.4 mmHg  RA Pressure:      3 mmHg  PASP:             28 mmHg    ________________________________________________________________________________________  Diagnosing Physician: Michael Quiles M.D.  Electronically signed on 5/24/2023 at 1:59:03 PM by Michael Quiles M.D.         *** Final ***    < end of copied text >

## 2024-09-14 LAB
GLUCOSE BLDC GLUCOMTR-MCNC: 287 MG/DL — HIGH (ref 70–99)
GLUCOSE BLDC GLUCOMTR-MCNC: 326 MG/DL — HIGH (ref 70–99)
GLUCOSE BLDC GLUCOMTR-MCNC: 328 MG/DL — HIGH (ref 70–99)
GLUCOSE BLDC GLUCOMTR-MCNC: 332 MG/DL — HIGH (ref 70–99)

## 2024-09-14 RX ADMIN — Medication 5000 UNIT(S): at 21:42

## 2024-09-14 RX ADMIN — Medication 75 MILLIGRAM(S): at 12:02

## 2024-09-14 RX ADMIN — Medication 40 MILLIGRAM(S): at 05:35

## 2024-09-14 RX ADMIN — Medication 100 MILLIGRAM(S): at 12:01

## 2024-09-14 RX ADMIN — CARVEDILOL 6.25 MILLIGRAM(S): 6.25 TABLET ORAL at 05:35

## 2024-09-14 RX ADMIN — VALSARTAN 40 MILLIGRAM(S): 40 TABLET ORAL at 05:35

## 2024-09-14 RX ADMIN — Medication 5000 UNIT(S): at 05:35

## 2024-09-14 RX ADMIN — Medication 1 MILLIGRAM(S): at 12:02

## 2024-09-14 RX ADMIN — Medication 4: at 12:00

## 2024-09-14 RX ADMIN — Medication 4: at 17:24

## 2024-09-14 RX ADMIN — Medication 81 MILLIGRAM(S): at 12:02

## 2024-09-14 RX ADMIN — CARVEDILOL 6.25 MILLIGRAM(S): 6.25 TABLET ORAL at 17:24

## 2024-09-14 RX ADMIN — Medication 2: at 21:42

## 2024-09-14 RX ADMIN — Medication 80 MILLIGRAM(S): at 21:43

## 2024-09-14 RX ADMIN — SERTRALINE HYDROCHLORIDE 25 MILLIGRAM(S): 50 TABLET, FILM COATED ORAL at 12:01

## 2024-09-14 RX ADMIN — Medication 100 MILLIGRAM(S): at 12:02

## 2024-09-14 RX ADMIN — REMDESIVIR 200 MILLIGRAM(S): 5 INJECTION INTRAVENOUS at 17:23

## 2024-09-14 RX ADMIN — Medication 5000 UNIT(S): at 15:16

## 2024-09-14 RX ADMIN — Medication 3: at 08:42

## 2024-09-14 NOTE — PATIENT PROFILE ADULT - FALL HARM RISK - RISK INTERVENTIONS

## 2024-09-14 NOTE — PROGRESS NOTE ADULT - ASSESSMENT
64   yr      hx CAD   s/p  2 drug eluding stents 11/2022       CHF, DM, gout, ESRD on dialysis      c/c  weakness  of   limbs/  neuro dr mccain/ h/o   gout, right  wrist,  has  functional  quadriplegia/  CT  head, . 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      pt  with  h/o intermittent  right  arm /leg weakness  has been   c/c  for  yrs/  with  unclear  etiology / MRI  head/  c spine in 10/22,  no cord  compression       seen by neuro in  past,   per  wife,  thinks  pt  has  a psychological  component  regarding  his  weakness/  s/p Strep pneumoniae  bacteremia  and pna           now admitted    with sob/ from covid/  fluid  overload          covid/  cxr  . infiltrates , on iv remdisivir     Gout     CAD,    s/p    pci.        on  asa/ plavix/   lipitor,       Echo,  ef  45. on 5/.23     CKD, on HD / c/c  anemia           renal  dr dickerson      c/c  anemia     DM,  follow  fs           on lantus/  endo  f/p     on  dvt ppx/ gabapentin,  asa. / plavix,  lipitor,  coreg., valsartan,  lantus,  zolofr. allopurinol     o2  sat  94,  on iv remdisivir         rad< from: Xray Chest 1 View- PORTABLE-Urgent (Xray Chest 1 View- PORTABLE-Urgent .) (09.12.24 @ 14:13) >  MPRESSION:  Mild pulmonary venous congestion/perihilar interstitial edema, new.  Right perihilar/lower lobe interstitial infiltrates again evident.  --- End of Report ---

## 2024-09-15 LAB
ANION GAP SERPL CALC-SCNC: 23 MMOL/L — HIGH (ref 5–17)
BUN SERPL-MCNC: 69 MG/DL — HIGH (ref 7–23)
CALCIUM SERPL-MCNC: 7.8 MG/DL — LOW (ref 8.4–10.5)
CHLORIDE SERPL-SCNC: 96 MMOL/L — SIGNIFICANT CHANGE UP (ref 96–108)
CO2 SERPL-SCNC: 20 MMOL/L — LOW (ref 22–31)
CREAT SERPL-MCNC: 9.9 MG/DL — HIGH (ref 0.5–1.3)
EGFR: 5 ML/MIN/1.73M2 — LOW
GLUCOSE BLDC GLUCOMTR-MCNC: 304 MG/DL — HIGH (ref 70–99)
GLUCOSE BLDC GLUCOMTR-MCNC: 315 MG/DL — HIGH (ref 70–99)
GLUCOSE BLDC GLUCOMTR-MCNC: 340 MG/DL — HIGH (ref 70–99)
GLUCOSE BLDC GLUCOMTR-MCNC: 429 MG/DL — HIGH (ref 70–99)
GLUCOSE SERPL-MCNC: 289 MG/DL — HIGH (ref 70–99)
HCT VFR BLD CALC: 30.3 % — LOW (ref 39–50)
HGB BLD-MCNC: 9.5 G/DL — LOW (ref 13–17)
MAGNESIUM SERPL-MCNC: 2.3 MG/DL — SIGNIFICANT CHANGE UP (ref 1.6–2.6)
MCHC RBC-ENTMCNC: 30.6 PG — SIGNIFICANT CHANGE UP (ref 27–34)
MCHC RBC-ENTMCNC: 31.4 GM/DL — LOW (ref 32–36)
MCV RBC AUTO: 97.7 FL — SIGNIFICANT CHANGE UP (ref 80–100)
NRBC # BLD: 0 /100 WBCS — SIGNIFICANT CHANGE UP (ref 0–0)
PHOSPHATE SERPL-MCNC: 7.2 MG/DL — HIGH (ref 2.5–4.5)
PLATELET # BLD AUTO: 190 K/UL — SIGNIFICANT CHANGE UP (ref 150–400)
POTASSIUM SERPL-MCNC: 4.2 MMOL/L — SIGNIFICANT CHANGE UP (ref 3.5–5.3)
POTASSIUM SERPL-SCNC: 4.2 MMOL/L — SIGNIFICANT CHANGE UP (ref 3.5–5.3)
RBC # BLD: 3.1 M/UL — LOW (ref 4.2–5.8)
RBC # FLD: 14 % — SIGNIFICANT CHANGE UP (ref 10.3–14.5)
SODIUM SERPL-SCNC: 139 MMOL/L — SIGNIFICANT CHANGE UP (ref 135–145)
WBC # BLD: 7.36 K/UL — SIGNIFICANT CHANGE UP (ref 3.8–10.5)
WBC # FLD AUTO: 7.36 K/UL — SIGNIFICANT CHANGE UP (ref 3.8–10.5)

## 2024-09-15 RX ORDER — CALCIUM ACETATE 667 MG/1
667 CAPSULE ORAL
Refills: 0 | Status: DISCONTINUED | OUTPATIENT
Start: 2024-09-15 | End: 2024-09-18

## 2024-09-15 RX ORDER — INSULIN GLARGINE 100 [IU]/ML
24 INJECTION, SOLUTION SUBCUTANEOUS AT BEDTIME
Refills: 0 | Status: DISCONTINUED | OUTPATIENT
Start: 2024-09-15 | End: 2024-09-17

## 2024-09-15 RX ADMIN — SERTRALINE HYDROCHLORIDE 25 MILLIGRAM(S): 50 TABLET, FILM COATED ORAL at 11:59

## 2024-09-15 RX ADMIN — CARVEDILOL 6.25 MILLIGRAM(S): 6.25 TABLET ORAL at 17:09

## 2024-09-15 RX ADMIN — Medication 2 TABLET(S): at 21:29

## 2024-09-15 RX ADMIN — CALCIUM ACETATE 667 MILLIGRAM(S): 667 CAPSULE ORAL at 11:58

## 2024-09-15 RX ADMIN — VALSARTAN 40 MILLIGRAM(S): 40 TABLET ORAL at 05:21

## 2024-09-15 RX ADMIN — Medication 4: at 08:07

## 2024-09-15 RX ADMIN — Medication 100 MILLIGRAM(S): at 11:59

## 2024-09-15 RX ADMIN — Medication 5000 UNIT(S): at 16:06

## 2024-09-15 RX ADMIN — Medication 75 MILLIGRAM(S): at 11:58

## 2024-09-15 RX ADMIN — INSULIN GLARGINE 24 UNIT(S): 100 INJECTION, SOLUTION SUBCUTANEOUS at 21:29

## 2024-09-15 RX ADMIN — Medication 100 MILLIGRAM(S): at 11:58

## 2024-09-15 RX ADMIN — Medication 1 MILLIGRAM(S): at 11:58

## 2024-09-15 RX ADMIN — Medication 6: at 17:09

## 2024-09-15 RX ADMIN — Medication 5000 UNIT(S): at 21:28

## 2024-09-15 RX ADMIN — Medication 4: at 11:57

## 2024-09-15 RX ADMIN — Medication 40 MILLIGRAM(S): at 05:21

## 2024-09-15 RX ADMIN — CALCIUM ACETATE 667 MILLIGRAM(S): 667 CAPSULE ORAL at 17:09

## 2024-09-15 RX ADMIN — Medication 80 MILLIGRAM(S): at 21:29

## 2024-09-15 RX ADMIN — Medication 81 MILLIGRAM(S): at 11:59

## 2024-09-15 RX ADMIN — CARVEDILOL 6.25 MILLIGRAM(S): 6.25 TABLET ORAL at 05:21

## 2024-09-15 RX ADMIN — Medication 2: at 21:28

## 2024-09-15 RX ADMIN — Medication 5000 UNIT(S): at 05:21

## 2024-09-15 RX ADMIN — REMDESIVIR 200 MILLIGRAM(S): 5 INJECTION INTRAVENOUS at 17:11

## 2024-09-15 NOTE — PROGRESS NOTE ADULT - ASSESSMENT
64-year male past medical history ESRD MWF, CAD s/p stents, HTN, HLD, DM presents with COVID  Mild heart failure     1 Renal- next HD on Monday and and Retcrit at hd  Hyperphosphatemia- Phoslo ordered  2 ID-Remdesivir started and at present not on steroids (but no renal objection)  3CVS- BP is stable;  Defer to Cards re repeat the echo

## 2024-09-15 NOTE — PROGRESS NOTE ADULT - ASSESSMENT
64   yr      hx CAD   s/p  2 drug eluding stents 11/2022       CHF, DM, gout, ESRD on dialysis      c/c  weakness  of   limbs/  neuro dr mccain/ h/o   gout, right  wrist,  has  functional  quadriplegia/  CT  head, . 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      pt  with  h/o intermittent  right  arm /leg weakness  has been   c/c  for  yrs/  with  unclear  etiology / MRI  head/  c spine in 10/22,  no cord  compression       seen by neuro in  past,   per  wife,  thinks  pt  has  a psychological  component  regarding  his  weakness/  s/p Strep pneumoniae  bacteremia  and pna           now admitted    with sob/ from covid/  fluid  overload          covid/  cxr , infiltrates , on iv remdisivir     Gout     CAD,    s/p    pci.        on  asa/ plavix/   lipitor,       Echo,  ef  45. on 5/.23     CKD, on HD / c/c  anemia           renal  dr dickerson      c/c  anemia     DM,  follow  fs           on lantus/  endo  f/p     on  dvt ppx/ gabapentin,  asa. / plavix,  lipitor,  coreg., valsartan,  lantus,  zolofr. allopurinol     o2  sat  94,  on iv remdisivir     hypergycemia,  need  house   endo  eval/ known  to  pt        rad< from: Xray Chest 1 View- PORTABLE-Urgent (Xray Chest 1 View- PORTABLE-Urgent .) (09.12.24 @ 14:13) >  MPRESSION:  Mild pulmonary venous congestion/perihilar interstitial edema, new.  Right perihilar/lower lobe interstitial infiltrates again evident.  --- End of Report ---

## 2024-09-16 DIAGNOSIS — E16.2 HYPOGLYCEMIA, UNSPECIFIED: ICD-10-CM

## 2024-09-16 DIAGNOSIS — E11.65 TYPE 2 DIABETES MELLITUS WITH HYPERGLYCEMIA: ICD-10-CM

## 2024-09-16 DIAGNOSIS — E78.5 HYPERLIPIDEMIA, UNSPECIFIED: ICD-10-CM

## 2024-09-16 DIAGNOSIS — I10 ESSENTIAL (PRIMARY) HYPERTENSION: ICD-10-CM

## 2024-09-16 LAB
GLUCOSE BLDC GLUCOMTR-MCNC: 120 MG/DL — HIGH (ref 70–99)
GLUCOSE BLDC GLUCOMTR-MCNC: 132 MG/DL — HIGH (ref 70–99)
GLUCOSE BLDC GLUCOMTR-MCNC: 186 MG/DL — HIGH (ref 70–99)
GLUCOSE BLDC GLUCOMTR-MCNC: 212 MG/DL — HIGH (ref 70–99)
GLUCOSE BLDC GLUCOMTR-MCNC: 332 MG/DL — HIGH (ref 70–99)
GLUCOSE BLDC GLUCOMTR-MCNC: 60 MG/DL — LOW (ref 70–99)
GLUCOSE BLDC GLUCOMTR-MCNC: 66 MG/DL — LOW (ref 70–99)

## 2024-09-16 PROCEDURE — 99223 1ST HOSP IP/OBS HIGH 75: CPT | Mod: GC

## 2024-09-16 RX ORDER — EPOETIN ALFA 3000 [IU]/ML
10000 SOLUTION INTRAVENOUS; SUBCUTANEOUS ONCE
Refills: 0 | Status: COMPLETED | OUTPATIENT
Start: 2024-09-16 | End: 2024-09-16

## 2024-09-16 RX ADMIN — Medication 81 MILLIGRAM(S): at 11:30

## 2024-09-16 RX ADMIN — CALCIUM ACETATE 667 MILLIGRAM(S): 667 CAPSULE ORAL at 08:23

## 2024-09-16 RX ADMIN — Medication 100 MILLIGRAM(S): at 11:28

## 2024-09-16 RX ADMIN — REMDESIVIR 200 MILLIGRAM(S): 5 INJECTION INTRAVENOUS at 18:04

## 2024-09-16 RX ADMIN — Medication 2 TABLET(S): at 22:18

## 2024-09-16 RX ADMIN — CARVEDILOL 6.25 MILLIGRAM(S): 6.25 TABLET ORAL at 06:24

## 2024-09-16 RX ADMIN — SERTRALINE HYDROCHLORIDE 25 MILLIGRAM(S): 50 TABLET, FILM COATED ORAL at 11:28

## 2024-09-16 RX ADMIN — Medication 6 UNIT(S): at 17:01

## 2024-09-16 RX ADMIN — CALCIUM ACETATE 667 MILLIGRAM(S): 667 CAPSULE ORAL at 17:01

## 2024-09-16 RX ADMIN — Medication 5000 UNIT(S): at 22:19

## 2024-09-16 RX ADMIN — Medication 1 MILLIGRAM(S): at 11:28

## 2024-09-16 RX ADMIN — EPOETIN ALFA 10000 UNIT(S): 3000 SOLUTION INTRAVENOUS; SUBCUTANEOUS at 14:26

## 2024-09-16 RX ADMIN — Medication 5000 UNIT(S): at 17:01

## 2024-09-16 RX ADMIN — CARVEDILOL 6.25 MILLIGRAM(S): 6.25 TABLET ORAL at 17:08

## 2024-09-16 RX ADMIN — Medication 1: at 17:00

## 2024-09-16 RX ADMIN — CALCIUM ACETATE 667 MILLIGRAM(S): 667 CAPSULE ORAL at 11:35

## 2024-09-16 RX ADMIN — Medication 75 MILLIGRAM(S): at 11:28

## 2024-09-16 RX ADMIN — VALSARTAN 40 MILLIGRAM(S): 40 TABLET ORAL at 06:24

## 2024-09-16 RX ADMIN — Medication 12 UNIT(S): at 08:23

## 2024-09-16 RX ADMIN — INSULIN GLARGINE 24 UNIT(S): 100 INJECTION, SOLUTION SUBCUTANEOUS at 22:18

## 2024-09-16 RX ADMIN — Medication 80 MILLIGRAM(S): at 22:18

## 2024-09-16 RX ADMIN — Medication 40 MILLIGRAM(S): at 06:24

## 2024-09-16 RX ADMIN — Medication 5000 UNIT(S): at 06:24

## 2024-09-16 RX ADMIN — Medication 4: at 08:22

## 2024-09-16 NOTE — CONSULT NOTE ADULT - SUBJECTIVE AND OBJECTIVE BOX
NOTE INCOMPLETE/ IN PROGRESS  *Please wait for attending attestation for official recommendations.     HPI:  64M with T2DM, ESRD on HD MWF, CAD s/p stents, HTN, HLD presents to ED for fatigue and muscle weakness x 5 days, found to be COVID-positive at  advised to come to ED for IV remdesivir. Home basal/bolus insulin was held since admission 9/12 until 9/15, causing hyperglycemia. Started on basal/bolus insulin, but had hypoglycemia after breakfast today.    Consulted for: Uncontrolled T2DM with hyperglycemia and hypoglycemia    Diabetes history:  T2DM was diagnosed more than 20+ years  Complicated by CAD s/p stents, diabetic retinopathy, neuropathy, nephropathy progressed to ESRD on HD.  Endocrinologist: Dr. Hernandez, Last saw NP Sari Delacruzr 8/20/24  Home regimen: Toujeo 24 units QHS, Humalog 12 units TIDAC + sliding scale. Reports hasn't taken trulicity for 1 month because of "laziness," he does not recall his last trulicity dose.  A1c: 10.2%  Diet: Reports he eats Chinese food like rice, vegetables, meat. Eats lots of fruit.      PAST MEDICAL & SURGICAL HISTORY:  Diabetes Mellitus Type II, Uncontrolled      Dyslipidemia      s/p Angioplasty with Stent      HTN (hypertension)      Gout      Diabetic retinopathy      GERD (gastroesophageal reflux disease)      Nephrolithiasis      Vitamin D deficiency      Hepatitis      CKD (chronic kidney disease)      Ischemic cardiomyopathy      ASHD (arteriosclerotic heart disease)      Pulmonary hypertension      Myocardial infarction      CAD (coronary artery disease)      BPH (benign prostatic hyperplasia)      ESRD on dialysis      H/O retinopathy      Hernia, umbilical      H/O diabetic neuropathy      S/P coronary artery stent placement  2010 TRICIA to Diag ; 11/18/2010  LAD; 2/4/11 ATOM liberte Diag; 5/15/2017  TRICIA to 1st diag; 6/7/17 PCI with laser and high pressure balloon      History of eye surgery  left eye      H/O lithotripsy      AV fistula      S/P foot surgery, left          FAMILY HISTORY:  Family history of cardiac disorder in father (Father)        Social History:  Lives at home    Outpatient Medications:    MEDICATIONS  (STANDING):  allopurinol 100 milliGRAM(s) Oral daily  aspirin enteric coated 81 milliGRAM(s) Oral daily  atorvastatin 80 milliGRAM(s) Oral at bedtime  calcium acetate 667 milliGRAM(s) Oral three times a day with meals  carvedilol 6.25 milliGRAM(s) Oral every 12 hours  clopidogrel Tablet 75 milliGRAM(s) Oral daily  dextrose 5%. 1000 milliLiter(s) (100 mL/Hr) IV Continuous <Continuous>  dextrose 5%. 1000 milliLiter(s) (50 mL/Hr) IV Continuous <Continuous>  dextrose 50% Injectable 25 Gram(s) IV Push once  dextrose 50% Injectable 12.5 Gram(s) IV Push once  dextrose 50% Injectable 25 Gram(s) IV Push once  epoetin wayne-epbx (RETACRIT) Injectable 50150 Unit(s) IV Push once  folic acid 1 milliGRAM(s) Oral daily  gabapentin 100 milliGRAM(s) Oral daily  glucagon  Injectable 1 milliGRAM(s) IntraMuscular once  heparin   Injectable 5000 Unit(s) SubCutaneous every 8 hours  insulin glargine Injectable (LANTUS) 24 Unit(s) SubCutaneous at bedtime  insulin lispro (ADMELOG) corrective regimen sliding scale   SubCutaneous three times a day before meals  insulin lispro (ADMELOG) corrective regimen sliding scale   SubCutaneous at bedtime  insulin lispro Injectable (ADMELOG) 12 Unit(s) SubCutaneous three times a day before meals  pantoprazole    Tablet 40 milliGRAM(s) Oral before breakfast  remdesivir  IVPB   IV Intermittent   remdesivir  IVPB 100 milliGRAM(s) IV Intermittent every 24 hours  senna 2 Tablet(s) Oral at bedtime  sertraline 25 milliGRAM(s) Oral daily  valsartan 40 milliGRAM(s) Oral daily    MEDICATIONS  (PRN):  dextrose Oral Gel 15 Gram(s) Oral once PRN Blood Glucose LESS THAN 70 milliGRAM(s)/deciliter      Allergies    No Known Allergies    Intolerances      Review of Systems:  Constitutional: No fever  Eyes: No blurry vision  Neuro: No tremors  HEENT: No pain  Cardiovascular: No chest pain, palpitations  Respiratory: No SOB, no cough  GI: No nausea, vomiting, abdominal pain  : No dysuria  Skin: no rash  Psych: no depression  Endocrine: no polyuria, polydipsia  Hem/lymph: no swelling  Osteoporosis: no fractures    ALL OTHER SYSTEMS REVIEWED AND NEGATIVE    UNABLE TO OBTAIN    PHYSICAL EXAM:  VITALS: T(C): 36.8 (09-16-24 @ 13:10)  T(F): 98.2 (09-16-24 @ 13:10), Max: 98.3 (09-15-24 @ 20:17)  HR: 78 (09-16-24 @ 13:10) (74 - 85)  BP: 140/69 (09-16-24 @ 13:10) (140/69 - 151/64)  RR:  (16 - 18)  SpO2:  (96% - 98%)  Wt(kg): --  GENERAL: NAD, well-groomed, well-developed  EYES: No proptosis, no lid lag, anicteric  HEENT:  Atraumatic, Normocephalic, moist mucous membranes  THYROID: Normal size, no palpable nodules  RESPIRATORY: Clear to auscultation bilaterally; No rales, rhonchi, wheezing  CARDIOVASCULAR: Regular rate and rhythm; No murmurs; no peripheral edema  GI: Soft, nontender, non distended, normal bowel sounds  SKIN: Dry, intact, No rashes or lesions  MUSCULOSKELETAL: Full range of motion, normal strength  NEURO: sensation intact, extraocular movements intact, no tremor  PSYCH: Alert and oriented x 3, normal affect, normal mood  CUSHING'S SIGNS: no striae      CAPILLARY BLOOD GLUCOSE      POCT Blood Glucose.: 120 mg/dL (16 Sep 2024 13:10)  POCT Blood Glucose.: 132 mg/dL (16 Sep 2024 11:46)  POCT Blood Glucose.: 60 mg/dL (16 Sep 2024 11:19)  POCT Blood Glucose.: 66 mg/dL (16 Sep 2024 11:16)  POCT Blood Glucose.: 332 mg/dL (16 Sep 2024 07:49)  POCT Blood Glucose.: 315 mg/dL (15 Sep 2024 21:10)  POCT Blood Glucose.: 429 mg/dL (15 Sep 2024 16:48)                            9.5    7.36  )-----------( 190      ( 15 Sep 2024 06:54 )             30.3       09-15    139  |  96  |  69<H>  ----------------------------<  289<H>  4.2   |  20<L>  |  9.90<H>    eGFR: 5<L>    Ca    7.8<L>      09-15  Mg     2.3     09-15  Phos  7.2     09-15        Thyroid Function Tests:      A1C with Estimated Average Glucose Result: 10.2 % (09-13-24 @ 07:17)  A1C with Estimated Average Glucose Result: 10.7 % (05-21-24 @ 19:06)  A1C with Estimated Average Glucose Result: 9.5 % (11-23-23 @ 18:24)          Radiology:              NOTE INCOMPLETE/ IN PROGRESS  *Please wait for attending attestation for official recommendations.     HPI:  64M with T2DM, ESRD on HD MWF, CAD s/p stents, HTN, HLD presents to ED for fatigue and muscle weakness x 5 days, found to be COVID-positive at  advised to come to ED for IV remdesivir. Home basal/bolus insulin was held since admission 9/12 until 9/15, causing hyperglycemia. Started on basal/bolus insulin, but had hypoglycemia after breakfast today.    Consulted for: Uncontrolled T2DM with hyperglycemia and hypoglycemia    Diabetes history:  T2DM was diagnosed more than 20+ years  Complicated by CAD s/p stents, diabetic retinopathy, neuropathy, nephropathy progressed to ESRD on HD.  Endocrinologist: Dr. Hernandez, Last saw NP Sari Delacruzr 8/20/24  Home regimen: Toujeo 24 units QHS, Humalog 12 units TIDAC + sliding scale. Reports hasn't taken trulicity for 1 month because of "laziness," he does not recall his last trulicity dose.  A1c: 10.2%  Diet: Reports he eats Chinese food like rice, vegetables, meat. Eats lots of fruit.      PAST MEDICAL & SURGICAL HISTORY:  Diabetes Mellitus Type II, Uncontrolled      Dyslipidemia      s/p Angioplasty with Stent      HTN (hypertension)      Gout      Diabetic retinopathy      GERD (gastroesophageal reflux disease)      Nephrolithiasis      Vitamin D deficiency      Hepatitis      CKD (chronic kidney disease)      Ischemic cardiomyopathy      ASHD (arteriosclerotic heart disease)      Pulmonary hypertension      Myocardial infarction      CAD (coronary artery disease)      BPH (benign prostatic hyperplasia)      ESRD on dialysis      H/O retinopathy      Hernia, umbilical      H/O diabetic neuropathy      S/P coronary artery stent placement  2010 TRICIA to Diag ; 11/18/2010  LAD; 2/4/11 ATOM liberte Diag; 5/15/2017  TRICIA to 1st diag; 6/7/17 PCI with laser and high pressure balloon      History of eye surgery  left eye      H/O lithotripsy      AV fistula      S/P foot surgery, left          FAMILY HISTORY:  Family history of cardiac disorder in father (Father)        Social History:  Lives at home  Smokes      Outpatient Medications:  Home Medications:  allopurinol 100 mg oral tablet: 1 tab(s) orally once a day (21 May 2024 17:43)  ascorbic acid 500 mg oral tablet: 1 tab(s) orally once a day (21 May 2024 17:41)  aspirin 81 mg oral tablet: 1 tab(s) orally once a day (21 May 2024 17:43)  atorvastatin 80 mg oral tablet: 1 tab(s) orally once a day (at bedtime) (21 May 2024 17:33)  carvedilol 12.5 mg oral tablet: 0.5 tab(s) orally once a day (at bedtime) .5 tab night before dialysis (21 May 2024 17:35)  carvedilol 12.5 mg oral tablet: 1 tab(s) orally once a day on non-dialysis days (21 May 2024 17:46)  clopidogrel 75 mg oral tablet: 1 tab(s) orally once a day (21 May 2024 17:43)  famotidine 10 mg oral tablet: 1 tab(s) orally once a day (21 May 2024 17:40)  folic acid 1 mg oral tablet: 1 tab(s) orally once a day (21 May 2024 17:43)  gabapentin 300 mg oral tablet: 1 tablet with sensor orally once a day (21 May 2024 17:36)  HumaLOG 100 units/mL subcutaneous solution: 12 unit(s) subcutaneous 3 times a day (21 May 2024 17:37)  melatonin 1 mg oral tablet: 3 tab(s) orally once a day (at bedtime) (21 May 2024 17:39)  pantoprazole 40 mg oral delayed release tablet: 1 tab(s) orally once a day (21 May 2024 17:40)  Senna 8.6 mg oral tablet: 2 tab(s) orally once a day (at bedtime) (21 May 2024 17:43)  Toujeo SoloStar 300 units/mL subcutaneous solution: 24 unit(s) subcutaneous once a day (at bedtime) (21 May 2024 17:38)  Tums Chewy Bites 750 mg oral tablet, chewable: 2 tab(s) chewed 3 times a day (21 May 2024 17:42)  valsartan 40 mg oral tablet: 1 tab(s) orally 2 times a day (21 May 2024 17:43)  Zoloft 25 mg oral tablet: 1 tab(s) orally once a day (21 May 2024 17:43)    MEDICATIONS  (STANDING):  allopurinol 100 milliGRAM(s) Oral daily  aspirin enteric coated 81 milliGRAM(s) Oral daily  atorvastatin 80 milliGRAM(s) Oral at bedtime  calcium acetate 667 milliGRAM(s) Oral three times a day with meals  carvedilol 6.25 milliGRAM(s) Oral every 12 hours  clopidogrel Tablet 75 milliGRAM(s) Oral daily  dextrose 5%. 1000 milliLiter(s) (100 mL/Hr) IV Continuous <Continuous>  dextrose 5%. 1000 milliLiter(s) (50 mL/Hr) IV Continuous <Continuous>  dextrose 50% Injectable 25 Gram(s) IV Push once  dextrose 50% Injectable 12.5 Gram(s) IV Push once  dextrose 50% Injectable 25 Gram(s) IV Push once  epoetin wayne-epbx (RETACRIT) Injectable 10378 Unit(s) IV Push once  folic acid 1 milliGRAM(s) Oral daily  gabapentin 100 milliGRAM(s) Oral daily  glucagon  Injectable 1 milliGRAM(s) IntraMuscular once  heparin   Injectable 5000 Unit(s) SubCutaneous every 8 hours  insulin glargine Injectable (LANTUS) 24 Unit(s) SubCutaneous at bedtime  insulin lispro (ADMELOG) corrective regimen sliding scale   SubCutaneous three times a day before meals  insulin lispro (ADMELOG) corrective regimen sliding scale   SubCutaneous at bedtime  insulin lispro Injectable (ADMELOG) 12 Unit(s) SubCutaneous three times a day before meals  pantoprazole    Tablet 40 milliGRAM(s) Oral before breakfast  remdesivir  IVPB   IV Intermittent   remdesivir  IVPB 100 milliGRAM(s) IV Intermittent every 24 hours  senna 2 Tablet(s) Oral at bedtime  sertraline 25 milliGRAM(s) Oral daily  valsartan 40 milliGRAM(s) Oral daily    MEDICATIONS  (PRN):  dextrose Oral Gel 15 Gram(s) Oral once PRN Blood Glucose LESS THAN 70 milliGRAM(s)/deciliter      Allergies    No Known Allergies    Intolerances      Review of Systems:  Constitutional: No fever  Eyes: No blurry vision  Neuro: No tremors  HEENT: No pain  Cardiovascular: No chest pain, palpitations  Respiratory: +SOB, cough  GI: No nausea, vomiting, abdominal pain  : No dysuria  Skin: no rash  Psych: no depression  Endocrine: no polyuria, polydipsia  Hem/lymph: no swelling  Osteoporosis: no fractures    ALL OTHER SYSTEMS REVIEWED AND NEGATIVE      PHYSICAL EXAM:  VITALS: T(C): 36.8 (09-16-24 @ 13:10)  T(F): 98.2 (09-16-24 @ 13:10), Max: 98.3 (09-15-24 @ 20:17)  HR: 78 (09-16-24 @ 13:10) (74 - 85)  BP: 140/69 (09-16-24 @ 13:10) (140/69 - 151/64)  RR:  (16 - 18)  SpO2:  (96% - 98%)  Wt(kg): --  GENERAL: NAD, obese. Undergoing HD.  EYES: No proptosis, no lid lag, anicteric  HEENT:  Atraumatic, Normocephalic, moist mucous membranes  RESPIRATORY: occasionally coughing. No respiratory distress.  CARDIOVASCULAR: Mild peripheral edema  GI: Distended but soft, nontender  SKIN: Dry, intact, No rashes or lesions  MUSCULOSKELETAL: LÓPEZ  NEURO: extraocular movements intact, no tremor  PSYCH: Alert and oriented x 3, flat affect  CUSHING'S SIGNS: no striae      CAPILLARY BLOOD GLUCOSE      POCT Blood Glucose.: 120 mg/dL (16 Sep 2024 13:10)  POCT Blood Glucose.: 132 mg/dL (16 Sep 2024 11:46)  POCT Blood Glucose.: 60 mg/dL (16 Sep 2024 11:19)  POCT Blood Glucose.: 66 mg/dL (16 Sep 2024 11:16)  POCT Blood Glucose.: 332 mg/dL (16 Sep 2024 07:49)  POCT Blood Glucose.: 315 mg/dL (15 Sep 2024 21:10)  POCT Blood Glucose.: 429 mg/dL (15 Sep 2024 16:48)                            9.5    7.36  )-----------( 190      ( 15 Sep 2024 06:54 )             30.3       09-15    139  |  96  |  69<H>  ----------------------------<  289<H>  4.2   |  20<L>  |  9.90<H>    eGFR: 5<L>    Ca    7.8<L>      09-15  Mg     2.3     09-15  Phos  7.2     09-15        Thyroid Function Tests:      A1C with Estimated Average Glucose Result: 10.2 % (09-13-24 @ 07:17)  A1C with Estimated Average Glucose Result: 10.7 % (05-21-24 @ 19:06)  A1C with Estimated Average Glucose Result: 9.5 % (11-23-23 @ 18:24)          Radiology:              HPI:  64M with T2DM, ESRD on HD MWF, CAD s/p stents, HTN, HLD presents to ED for fatigue and muscle weakness x 5 days, found to be COVID-positive at  advised to come to ED for IV remdesivir. Home basal/bolus insulin was held since admission 9/12 until 9/15, causing hyperglycemia. Started on basal/bolus insulin, but had hypoglycemia after breakfast today.    Consulted for: Uncontrolled T2DM with hyperglycemia and hypoglycemia    Diabetes history:  T2DM was diagnosed more than 20+ years  Complicated by CAD s/p stents, diabetic retinopathy, neuropathy, nephropathy progressed to ESRD on HD.  Endocrinologist: Dr. Hernandez, Last saw NP Sari Delacruzr 8/20/24  Home regimen: Toujeo 24 units QHS, Humalog 12 units TIDAC + sliding scale. Reports hasn't taken trulicity for 1 month because of "laziness," he does not recall his last trulicity dose.  A1c: 10.2%  Diet: Reports he eats Chinese food like rice, vegetables, meat. Eats lots of fruit.      PAST MEDICAL & SURGICAL HISTORY:  Diabetes Mellitus Type II, Uncontrolled      Dyslipidemia      s/p Angioplasty with Stent      HTN (hypertension)      Gout      Diabetic retinopathy      GERD (gastroesophageal reflux disease)      Nephrolithiasis      Vitamin D deficiency      Hepatitis      CKD (chronic kidney disease)      Ischemic cardiomyopathy      ASHD (arteriosclerotic heart disease)      Pulmonary hypertension      Myocardial infarction      CAD (coronary artery disease)      BPH (benign prostatic hyperplasia)      ESRD on dialysis      H/O retinopathy      Hernia, umbilical      H/O diabetic neuropathy      S/P coronary artery stent placement  2010 TRICIA to Diag ; 11/18/2010  LAD; 2/4/11 ATOM liberte Diag; 5/15/2017  TRICIA to 1st diag; 6/7/17 PCI with laser and high pressure balloon      History of eye surgery  left eye      H/O lithotripsy      AV fistula      S/P foot surgery, left          FAMILY HISTORY:  Family history of cardiac disorder in father (Father)        Social History:  Lives at home  Smokes      Outpatient Medications:  Home Medications:  allopurinol 100 mg oral tablet: 1 tab(s) orally once a day (21 May 2024 17:43)  ascorbic acid 500 mg oral tablet: 1 tab(s) orally once a day (21 May 2024 17:41)  aspirin 81 mg oral tablet: 1 tab(s) orally once a day (21 May 2024 17:43)  atorvastatin 80 mg oral tablet: 1 tab(s) orally once a day (at bedtime) (21 May 2024 17:33)  carvedilol 12.5 mg oral tablet: 0.5 tab(s) orally once a day (at bedtime) .5 tab night before dialysis (21 May 2024 17:35)  carvedilol 12.5 mg oral tablet: 1 tab(s) orally once a day on non-dialysis days (21 May 2024 17:46)  clopidogrel 75 mg oral tablet: 1 tab(s) orally once a day (21 May 2024 17:43)  famotidine 10 mg oral tablet: 1 tab(s) orally once a day (21 May 2024 17:40)  folic acid 1 mg oral tablet: 1 tab(s) orally once a day (21 May 2024 17:43)  gabapentin 300 mg oral tablet: 1 tablet with sensor orally once a day (21 May 2024 17:36)  HumaLOG 100 units/mL subcutaneous solution: 12 unit(s) subcutaneous 3 times a day (21 May 2024 17:37)  melatonin 1 mg oral tablet: 3 tab(s) orally once a day (at bedtime) (21 May 2024 17:39)  pantoprazole 40 mg oral delayed release tablet: 1 tab(s) orally once a day (21 May 2024 17:40)  Senna 8.6 mg oral tablet: 2 tab(s) orally once a day (at bedtime) (21 May 2024 17:43)  Toujeo SoloStar 300 units/mL subcutaneous solution: 24 unit(s) subcutaneous once a day (at bedtime) (21 May 2024 17:38)  Tums Chewy Bites 750 mg oral tablet, chewable: 2 tab(s) chewed 3 times a day (21 May 2024 17:42)  valsartan 40 mg oral tablet: 1 tab(s) orally 2 times a day (21 May 2024 17:43)  Zoloft 25 mg oral tablet: 1 tab(s) orally once a day (21 May 2024 17:43)    MEDICATIONS  (STANDING):  allopurinol 100 milliGRAM(s) Oral daily  aspirin enteric coated 81 milliGRAM(s) Oral daily  atorvastatin 80 milliGRAM(s) Oral at bedtime  calcium acetate 667 milliGRAM(s) Oral three times a day with meals  carvedilol 6.25 milliGRAM(s) Oral every 12 hours  clopidogrel Tablet 75 milliGRAM(s) Oral daily  dextrose 5%. 1000 milliLiter(s) (100 mL/Hr) IV Continuous <Continuous>  dextrose 5%. 1000 milliLiter(s) (50 mL/Hr) IV Continuous <Continuous>  dextrose 50% Injectable 25 Gram(s) IV Push once  dextrose 50% Injectable 12.5 Gram(s) IV Push once  dextrose 50% Injectable 25 Gram(s) IV Push once  epoetin wayne-epbx (RETACRIT) Injectable 01046 Unit(s) IV Push once  folic acid 1 milliGRAM(s) Oral daily  gabapentin 100 milliGRAM(s) Oral daily  glucagon  Injectable 1 milliGRAM(s) IntraMuscular once  heparin   Injectable 5000 Unit(s) SubCutaneous every 8 hours  insulin glargine Injectable (LANTUS) 24 Unit(s) SubCutaneous at bedtime  insulin lispro (ADMELOG) corrective regimen sliding scale   SubCutaneous three times a day before meals  insulin lispro (ADMELOG) corrective regimen sliding scale   SubCutaneous at bedtime  insulin lispro Injectable (ADMELOG) 12 Unit(s) SubCutaneous three times a day before meals  pantoprazole    Tablet 40 milliGRAM(s) Oral before breakfast  remdesivir  IVPB   IV Intermittent   remdesivir  IVPB 100 milliGRAM(s) IV Intermittent every 24 hours  senna 2 Tablet(s) Oral at bedtime  sertraline 25 milliGRAM(s) Oral daily  valsartan 40 milliGRAM(s) Oral daily    MEDICATIONS  (PRN):  dextrose Oral Gel 15 Gram(s) Oral once PRN Blood Glucose LESS THAN 70 milliGRAM(s)/deciliter      Allergies    No Known Allergies    Intolerances      Review of Systems:  Constitutional: No fever  Eyes: No blurry vision  Neuro: No tremors  HEENT: No pain  Cardiovascular: No chest pain, palpitations  Respiratory: +SOB, cough  GI: No nausea, vomiting, abdominal pain  : No dysuria  Skin: no rash  Psych: no depression  Endocrine: no polyuria, polydipsia  Hem/lymph: no swelling  Osteoporosis: no fractures    ALL OTHER SYSTEMS REVIEWED AND NEGATIVE      PHYSICAL EXAM:  VITALS: T(C): 36.8 (09-16-24 @ 13:10)  T(F): 98.2 (09-16-24 @ 13:10), Max: 98.3 (09-15-24 @ 20:17)  HR: 78 (09-16-24 @ 13:10) (74 - 85)  BP: 140/69 (09-16-24 @ 13:10) (140/69 - 151/64)  RR:  (16 - 18)  SpO2:  (96% - 98%)  Wt(kg): --  GENERAL: NAD, obese. Undergoing HD.  EYES: No proptosis, no lid lag, anicteric  HEENT:  Atraumatic, Normocephalic, moist mucous membranes  RESPIRATORY: occasionally coughing. No respiratory distress.  CARDIOVASCULAR: Mild peripheral edema  GI: Distended but soft, nontender  SKIN: Dry, intact, No rashes or lesions  MUSCULOSKELETAL: LÓPEZ  NEURO: extraocular movements intact, no tremor  PSYCH: Alert and oriented x 3, flat affect  CUSHING'S SIGNS: no striae      CAPILLARY BLOOD GLUCOSE      POCT Blood Glucose.: 120 mg/dL (16 Sep 2024 13:10)  POCT Blood Glucose.: 132 mg/dL (16 Sep 2024 11:46)  POCT Blood Glucose.: 60 mg/dL (16 Sep 2024 11:19)  POCT Blood Glucose.: 66 mg/dL (16 Sep 2024 11:16)  POCT Blood Glucose.: 332 mg/dL (16 Sep 2024 07:49)  POCT Blood Glucose.: 315 mg/dL (15 Sep 2024 21:10)  POCT Blood Glucose.: 429 mg/dL (15 Sep 2024 16:48)                            9.5    7.36  )-----------( 190      ( 15 Sep 2024 06:54 )             30.3       09-15    139  |  96  |  69<H>  ----------------------------<  289<H>  4.2   |  20<L>  |  9.90<H>    eGFR: 5<L>    Ca    7.8<L>      09-15  Mg     2.3     09-15  Phos  7.2     09-15        Thyroid Function Tests:      A1C with Estimated Average Glucose Result: 10.2 % (09-13-24 @ 07:17)  A1C with Estimated Average Glucose Result: 10.7 % (05-21-24 @ 19:06)  A1C with Estimated Average Glucose Result: 9.5 % (11-23-23 @ 18:24)          Radiology:

## 2024-09-16 NOTE — CONSULT NOTE ADULT - ASSESSMENT
64M with T2DM, ESRD on HD MWF, CAD s/p stents, HTN, HLD presents to ED for fatigue and muscle weakness x 5 days, found to be COVID-positive at  advised to come to ED for IV remdesivir. Home basal/bolus insulin was held since admission 9/12 until 9/15, causing hyperglycemia. Started on basal/bolus insulin, but had hypoglycemia after breakfast today.    Consulted for: Uncontrolled T2DM with hyperglycemia and hypoglycemia    Diabetes history:  T2DM was diagnosed more than 20+ years  Complicated by CAD s/p stents, diabetic retinopathy, neuropathy, nephropathy progressed to ESRD on HD.  Endocrinologist: Dr. Hernandez, Last saw NP Sari Delacruzr 8/20/24  Home regimen: Toujeo 24 units QHS, Humalog 12 units TIDAC + sliding scale. Reports hasn't taken trulicity for 1 month because of "laziness," he does not recall his last trulicity dose.  A1c: 10.2%  Diet: Reports he eats Chinese food like rice, vegetables, meat. Eats lots of fruit.   64M with T2DM, ESRD on HD MWF, CAD s/p stents, HTN, HLD admitted for COVID, receiving remdesivir. Endocrine consulted for uncontrolled T2DM with hyperglycemia and hypoglycemia    #Uncontrolled T2DM with hyperglycemia and hypoglycemia  T2DM was diagnosed more than 20+ years  Complicated by CAD s/p stents, diabetic retinopathy, neuropathy, nephropathy progressed to ESRD on HD.  Endocrinologist: Dr. Hernandez, Last saw NP Sari Uhr 8/20/24  Home regimen: Toujeo 24 units QHS, Humalog 12 units TIDAC + sliding scale. Reports hasn't taken trulicity for 1 month because of "laziness," he does not recall his last trulicity dose.  A1c: 10.2%  Diet: Reports he eats Chinese food like rice, vegetables, meat. Eats lots of fruit.    Basal/bolus insulin was held since admission 9/12 to 9/15 causing hyperglycemia. Started on home basal insulin last night and bolus insulin this morning and had hypoglycemia post-breakfast. Hypoglycemia possibly due to decreased insulin requirements with inpatient diet vs home diet.    Inpatient plan:  - Inpatient BG goal 100-180  - Increase Lantus to 28 units QHS  - Decrease Admelog to 8 units TIDAC; HOLD IF NPO OR IF EATING LESS THAN 50% OF TRAY  - Low dose admelog correction scale TIDAC  - Low dose admelog correction scale QHS  - CC diet    Discharge plan:  - Discharge regimen: Home Basal/bolus insulin, dose TBD. Restart trulicity at 0.75mg weekly on discharge.  - Endocrine follow up: Dr. Hernandez  - Routine ophthalmology and podiatry follow up    #HTN  - goal <130/80  - on carvedilol 6.25mg BID, valsartan 40mg daily  - management by primary team/neprhology    #HLD  - continue atorvastatin 80mg qhs  - check lipid panel outpatient    Discussed with primary team    David Jaime MD  Endocrine Fellow  Can be reached via teams. For follow up questions, discharge recommendations, or new consults, please call answering service at [phone] (weekdays); [phone] (nights/weekends). 64M with T2DM, ESRD on HD MWF, CAD s/p stents, HTN, HLD admitted for COVID, receiving remdesivir. Endocrine consulted for uncontrolled T2DM with hyperglycemia and hypoglycemia (high risk patient with severely uncontrolled Type 2 DM w/ hyperglycemia with A1c of 10.2% at high risk of CAD and CVA with high medical complexity and high level decision-making).     #Uncontrolled T2DM with hyperglycemia and hypoglycemia  T2DM was diagnosed more than 20+ years  Complicated by CAD s/p stents, diabetic retinopathy, neuropathy, nephropathy progressed to ESRD on HD.  Endocrinologist: Dr. Hernandez, Last saw NP Sari r 8/20/24  Home regimen: Toujeo 24 units QHS, Humalog 12 units TIDAC + sliding scale. Reports hasn't taken trulicity for 1 month because of "laziness," he does not recall his last trulicity dose.  A1c: 10.2%  Diet: Reports he eats Chinese food like rice, vegetables, meat. Eats lots of fruit.    Basal/bolus insulin was held since admission 9/12 to 9/15 causing hyperglycemia. Started on home basal insulin last night and bolus insulin this morning and had hypoglycemia post-breakfast. Hypoglycemia possibly due to decreased insulin requirements with inpatient diet vs home diet.    Inpatient plan:  - Inpatient BG goal 100-180  - Increase Lantus to 24 units QHS  - Decrease Admelog to 6 units TIDAC; HOLD IF NPO OR IF EATING LESS THAN 50% OF TRAY  - Low dose admelog correction scale TIDAC  - Low dose admelog correction scale QHS  - CC diet    Discharge plan:  - Discharge regimen: Home Basal/bolus insulin, dose TBD. Restart trulicity at 0.75mg weekly on discharge.  - Endocrine follow up: Dr. Hernandez  - Routine ophthalmology and podiatry follow up    #HTN  - goal <130/80  - on carvedilol 6.25mg BID, valsartan 40mg daily  - management by primary team/neprhology    #HLD  - continue atorvastatin 80mg qhs  - check lipid panel outpatient    Discussed with primary team    David Jaime MD  Endocrine Fellow  Can be reached via teams. For follow up questions, discharge recommendations, or new consults, please call answering service at [phone] (weekdays); [phone] (nights/weekends). 64M with T2DM, ESRD on HD MWF, CAD s/p stents, HTN, HLD admitted for COVID, receiving remdesivir. Endocrine consulted for uncontrolled T2DM with hyperglycemia and hypoglycemia (high risk patient with severely uncontrolled Type 2 DM w/ hyperglycemia with A1c of 10.2% at high risk of CAD and CVA with high medical complexity and high level decision-making).     #Uncontrolled T2DM with hyperglycemia and hypoglycemia  T2DM was diagnosed more than 20+ years  Complicated by CAD s/p stents, diabetic retinopathy, neuropathy, nephropathy progressed to ESRD on HD.  Endocrinologist: Dr. Hernandez, Last saw NP Sari r 8/20/24  Home regimen: Toujeo 24 units QHS, Humalog 12 units TIDAC + sliding scale. Reports hasn't taken trulicity for 1 month because of "laziness," he does not recall his last trulicity dose.  A1c: 10.2%  Diet: Reports he eats Chinese food like rice, vegetables, meat. Eats lots of fruit.    Basal/bolus insulin was held since admission 9/12 to 9/15 causing hyperglycemia. Started on home basal insulin last night and bolus insulin this morning and had hypoglycemia post-breakfast. Hypoglycemia possibly due to decreased insulin requirements with inpatient diet vs home diet.    Inpatient plan:  - Inpatient BG goal 100-180  - continue Lantus 24 units QHS  - Decrease Admelog to 6 units TIDAC; HOLD IF NPO OR IF EATING LESS THAN 50% OF TRAY  - Low dose admelog correction scale TIDAC  - Low dose admelog correction scale QHS  - CC diet    Discharge plan:  - Discharge regimen: Home Basal/bolus insulin, dose TBD. Restart trulicity at 0.75mg weekly on discharge.  - Endocrine follow up: Dr. Hernandez  - Routine ophthalmology and podiatry follow up    #HTN  - goal <130/80  - on carvedilol 6.25mg BID, valsartan 40mg daily  - management by primary team/neprhology    #HLD  - continue atorvastatin 80mg qhs  - check lipid panel outpatient    Discussed with primary team    David Jaime MD  Endocrine Fellow  Can be reached via teams. For follow up questions, discharge recommendations, or new consults, please call answering service at [phone] (weekdays); [phone] (nights/weekends).

## 2024-09-16 NOTE — PROGRESS NOTE ADULT - ASSESSMENT
64   yr      hx CAD   s/p  2 drug eluding stents 11/2022       CHF, DM, gout, ESRD on dialysis      c/c  weakness  of   limbs/  neuro dr mccain/ h/o   gout, right  wrist,  has  functional  quadriplegia/  CT  head, . 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      pt  with  h/o intermittent  right  arm /leg weakness  has been   c/c  for  yrs/  with  unclear  etiology / MRI  head/  c spine in 10/22,  no cord  compression       seen by neuro in  past,   per  wife,  thinks  pt  has  a psychological  component  regarding  his  weakness/  s/p Strep pneumoniae  bacteremia  and pna           now admitted    with sob/ from covid/  fluid  overload          covid/  cxr , infiltrates , on iv remdisivir     Gout     CAD,    s/p    pci.        on  asa/ plavix/   lipitor,       Echo,  ef  45. on 5/.23     CKD, on HD / c/c  anemia           renal  dr dickerson      c/c  anemia     DM,  follow  fs           on lantus/  endo  f/p     on  dvt ppx/ gabapentin,  asa. / plavix,  lipitor,  coreg., valsartan,  lantus,  zolofr. allopurinol     o2  sat  94,  on iv remdisivir     hypergycemia,  noted,    need  house   endo  eval/ known  to  pt/  pt  to  be  ambulates        rad< from: Xray Chest 1 View- PORTABLE-Urgent (Xray Chest 1 View- PORTABLE-Urgent .) (09.12.24 @ 14:13) >  MPRESSION:  Mild pulmonary venous congestion/perihilar interstitial edema, new.  Right perihilar/lower lobe interstitial infiltrates again evident.  --- End of Report ---

## 2024-09-16 NOTE — PROVIDER CONTACT NOTE (HYPOGLYCEMIA EVENT) - NS PROVIDER CONTACT ASSESS-HYPO
Pt AOx4. Pt in AM received premeal insulin and sliding scale coverage at breakfast. Pt states after receiving insulin, he did not eat much of his breakfast. Now, pt FS at lunch-66, rechecked- 60. Pt asymptomatic. NP aware- juice and lunch tray given.

## 2024-09-16 NOTE — CONSULT NOTE ADULT - ATTENDING COMMENTS
Agree with assessment and plan as above by Dr. Jaime. Reviewed all pertinent labs, glucose values, and imaging studies. Modifications made as indicated above. Pt. with severely uncontrolled diabetes with hyper and hypoglycemia. Based on insulin requirements would recommend Lantus 24 units qhs and start Admelog 6 units with meals with low correction. Plan to d/c on basal bolus and restart Trulicity.     Kendrick Gorman D.O  [phone]

## 2024-09-16 NOTE — PROVIDER CONTACT NOTE (HYPOGLYCEMIA EVENT) - NS PROVIDER CONTACT BACKGROUND-HYPO
Age: 64y    Gender: Male    POCT Blood Glucose:  186 mg/dL (09-16-24 @ 16:18)  120 mg/dL (09-16-24 @ 13:10)  132 mg/dL (09-16-24 @ 11:46)  60 mg/dL (09-16-24 @ 11:19)  66 mg/dL (09-16-24 @ 11:16)  332 mg/dL (09-16-24 @ 07:49)  315 mg/dL (09-15-24 @ 21:10)      eMAR:allopurinol   100 milliGRAM(s) Oral (09-16-24 @ 11:28)    atorvastatin   80 milliGRAM(s) Oral (09-15-24 @ 21:29)    insulin glargine Injectable (LANTUS)   24 Unit(s) SubCutaneous (09-15-24 @ 21:29)    insulin lispro (ADMELOG) corrective regimen sliding scale   1 Unit(s) SubCutaneous (09-16-24 @ 17:00)   4 Unit(s) SubCutaneous (09-16-24 @ 08:22)    insulin lispro (ADMELOG) corrective regimen sliding scale   2 Unit(s) SubCutaneous (09-15-24 @ 21:28)    insulin lispro Injectable (ADMELOG)   6 Unit(s) SubCutaneous (09-16-24 @ 17:01)    insulin lispro Injectable (ADMELOG)   12 Unit(s) SubCutaneous (09-16-24 @ 08:23)

## 2024-09-16 NOTE — PROGRESS NOTE ADULT - ASSESSMENT
64-year male past medical history ESRD MWF, CAD s/p stents, HTN, HLD, DM presents with COVID  Mild heart failure     1 Renal- HD today and Retcrit at hd  Hyperphosphatemia- Phoslo ordered  2 ID-Remdesivir started and at present not on steroids (but no renal objection)  3CVS- BP is stable;  Defer to Cards re repeat the echo       Sayed [facility]   [facility]   [phone]

## 2024-09-17 ENCOUNTER — TRANSCRIPTION ENCOUNTER (OUTPATIENT)
Age: 64
End: 2024-09-17

## 2024-09-17 LAB
CULTURE RESULTS: SIGNIFICANT CHANGE UP
CULTURE RESULTS: SIGNIFICANT CHANGE UP
GLUCOSE BLDC GLUCOMTR-MCNC: 116 MG/DL — HIGH (ref 70–99)
GLUCOSE BLDC GLUCOMTR-MCNC: 144 MG/DL — HIGH (ref 70–99)
GLUCOSE BLDC GLUCOMTR-MCNC: 248 MG/DL — HIGH (ref 70–99)
GLUCOSE BLDC GLUCOMTR-MCNC: 279 MG/DL — HIGH (ref 70–99)
SPECIMEN SOURCE: SIGNIFICANT CHANGE UP
SPECIMEN SOURCE: SIGNIFICANT CHANGE UP

## 2024-09-17 PROCEDURE — 99232 SBSQ HOSP IP/OBS MODERATE 35: CPT

## 2024-09-17 RX ORDER — CARVEDILOL 6.25 MG/1
1 TABLET ORAL
Qty: 0 | Refills: 0 | DISCHARGE
Start: 2024-09-17

## 2024-09-17 RX ORDER — CALCIUM ACETATE 667 MG/1
1 CAPSULE ORAL
Qty: 0 | Refills: 0 | DISCHARGE
Start: 2024-09-17

## 2024-09-17 RX ORDER — INSULIN GLARGINE 100 [IU]/ML
21 INJECTION, SOLUTION SUBCUTANEOUS AT BEDTIME
Refills: 0 | Status: DISCONTINUED | OUTPATIENT
Start: 2024-09-17 | End: 2024-09-18

## 2024-09-17 RX ADMIN — Medication 100 MILLIGRAM(S): at 12:47

## 2024-09-17 RX ADMIN — INSULIN GLARGINE 21 UNIT(S): 100 INJECTION, SOLUTION SUBCUTANEOUS at 21:45

## 2024-09-17 RX ADMIN — Medication 8 UNIT(S): at 17:27

## 2024-09-17 RX ADMIN — Medication 5000 UNIT(S): at 05:53

## 2024-09-17 RX ADMIN — CALCIUM ACETATE 667 MILLIGRAM(S): 667 CAPSULE ORAL at 17:28

## 2024-09-17 RX ADMIN — Medication 6 UNIT(S): at 09:05

## 2024-09-17 RX ADMIN — Medication 1: at 21:44

## 2024-09-17 RX ADMIN — Medication 2: at 17:27

## 2024-09-17 RX ADMIN — VALSARTAN 40 MILLIGRAM(S): 40 TABLET ORAL at 05:54

## 2024-09-17 RX ADMIN — Medication 40 MILLIGRAM(S): at 05:54

## 2024-09-17 RX ADMIN — CALCIUM ACETATE 667 MILLIGRAM(S): 667 CAPSULE ORAL at 12:47

## 2024-09-17 RX ADMIN — Medication 1 MILLIGRAM(S): at 12:47

## 2024-09-17 RX ADMIN — CALCIUM ACETATE 667 MILLIGRAM(S): 667 CAPSULE ORAL at 09:04

## 2024-09-17 RX ADMIN — SERTRALINE HYDROCHLORIDE 25 MILLIGRAM(S): 50 TABLET, FILM COATED ORAL at 12:48

## 2024-09-17 RX ADMIN — Medication 8 UNIT(S): at 12:46

## 2024-09-17 RX ADMIN — Medication 5000 UNIT(S): at 14:12

## 2024-09-17 RX ADMIN — Medication 80 MILLIGRAM(S): at 21:43

## 2024-09-17 RX ADMIN — CARVEDILOL 6.25 MILLIGRAM(S): 6.25 TABLET ORAL at 17:28

## 2024-09-17 RX ADMIN — Medication 81 MILLIGRAM(S): at 12:47

## 2024-09-17 RX ADMIN — CARVEDILOL 6.25 MILLIGRAM(S): 6.25 TABLET ORAL at 05:53

## 2024-09-17 RX ADMIN — Medication 75 MILLIGRAM(S): at 12:47

## 2024-09-17 NOTE — DISCHARGE NOTE PROVIDER - CARE PROVIDERS DIRECT ADDRESSES
,[email or web address]

## 2024-09-17 NOTE — DISCHARGE NOTE PROVIDER - NSDCMRMEDTOKEN_GEN_ALL_CORE_FT
allopurinol 100 mg oral tablet: 1 tab(s) orally once a day  ascorbic acid 500 mg oral tablet: 1 tab(s) orally once a day  aspirin 81 mg oral tablet: 1 tab(s) orally once a day  atorvastatin 80 mg oral tablet: 1 tab(s) orally once a day (at bedtime)  calcium acetate 667 mg oral tablet: 1 tab(s) orally 3 times a day (before meals)  carvedilol 12.5 mg oral tablet: 1 tab(s) orally once a day on non-dialysis days  carvedilol 12.5 mg oral tablet: 0.5 tab(s) orally once a day (at bedtime) .5 tab night before dialysis  carvedilol 6.25 mg oral tablet: 1 tab(s) orally every 12 hours  clopidogrel 75 mg oral tablet: 1 tab(s) orally once a day  famotidine 10 mg oral tablet: 1 tab(s) orally once a day  folic acid 1 mg oral tablet: 1 tab(s) orally once a day  gabapentin 300 mg oral tablet: 1 tablet with sensor orally once a day  HumaLOG 100 units/mL subcutaneous solution: 12 unit(s) subcutaneous 3 times a day  melatonin 1 mg oral tablet: 3 tab(s) orally once a day (at bedtime)  pantoprazole 40 mg oral delayed release tablet: 1 tab(s) orally once a day  Senna 8.6 mg oral tablet: 2 tab(s) orally once a day (at bedtime)  Toujeo SoloStar 300 units/mL subcutaneous solution: 24 unit(s) subcutaneous once a day (at bedtime)  Trulicity Pen 0.75 mg/0.5 mL subcutaneous solution: 0.75 milligram(s) subcutaneously once a week  Tums Chewy Bites 750 mg oral tablet, chewable: 2 tab(s) chewed 3 times a day  valsartan 40 mg oral tablet: 1 tab(s) orally 2 times a day  Zoloft 25 mg oral tablet: 1 tab(s) orally once a day   acetaminophen 325 mg oral tablet: 2 tab(s) orally every 6 hours As needed Mild Pain (1 - 3)  allopurinol 100 mg oral tablet: 1 tab(s) orally once a day  ascorbic acid 500 mg oral tablet: 1 tab(s) orally once a day  aspirin 81 mg oral tablet: 1 tab(s) orally once a day  atorvastatin 80 mg oral tablet: 1 tab(s) orally once a day (at bedtime)  calcium acetate 667 mg oral tablet: 1 tab(s) orally 3 times a day (before meals)  carvedilol 6.25 mg oral tablet: 1 tab(s) orally every 12 hours  clopidogrel 75 mg oral tablet: 1 tab(s) orally once a day  famotidine 10 mg oral tablet: 1 tab(s) orally once a day  folic acid 1 mg oral tablet: 1 tab(s) orally once a day  gabapentin 300 mg oral tablet: 1 tablet with sensor orally once a day  HumaLOG 100 units/mL subcutaneous solution: 10 unit(s) subcutaneous 3 times a day (before meals)  melatonin 1 mg oral tablet: 3 tab(s) orally once a day (at bedtime)  pantoprazole 40 mg oral delayed release tablet: 1 tab(s) orally once a day  Senna 8.6 mg oral tablet: 2 tab(s) orally once a day (at bedtime)  Toujeo Max SoloStar 300 units/mL subcutaneous solution: 24 unit(s) subcutaneous once a day (at bedtime)  Trulicity Pen 0.75 mg/0.5 mL subcutaneous solution: 0.75 milligram(s) subcutaneously once a week  Tums Chewy Bites 750 mg oral tablet, chewable: 2 tab(s) chewed 3 times a day  valsartan 40 mg oral tablet: 1 tab(s) orally 2 times a day  Zoloft 25 mg oral tablet: 1 tab(s) orally once a day

## 2024-09-17 NOTE — DISCHARGE NOTE PROVIDER - NSDCFUADDINST_GEN_ALL_CORE_FT
Consis Consistent carbohydrate diabetic diet with evening snack  no diplopia/no loss of vision L/no loss of vision R/no photophobia/no blurred vision R/no blurred vision L

## 2024-09-17 NOTE — PROGRESS NOTE ADULT - ASSESSMENT
64-year male past medical history ESRD MWF, CAD s/p stents, HTN, HLD, DM presents with COVID  Mild heart failure     1 Renal- HD in am  and Retcrit at hd  Hyperphosphatemia- Phoslo ordered  2 CVS- BP is stable;  Defer to Cards re repeat the echo n BP is stable   3 Endo-Cont statins       Sayed [facility]   [phone]

## 2024-09-17 NOTE — DISCHARGE NOTE PROVIDER - CARE PROVIDER_API CALL
Rosaline Israel  [facility] Medicine  [address], [address]  [place] [address]  Phone: [phone]  Fax: [phone]  Follow Up Time: 1 week    Steve Hernandez  Endocrinology/Metab/Diabetes  [address], [address]  [place] [address]  Phone: [phone]  Fax: [phone]  Follow Up Time: 1 month

## 2024-09-17 NOTE — DISCHARGE NOTE PROVIDER - NSDCFUSCHEDAPPT_GEN_ALL_CORE_FT
Steve Hernandez S  [facility]  [address]  Scheduled Appointment: 11/26/2024

## 2024-09-17 NOTE — DISCHARGE NOTE PROVIDER - NSDCDCMDCOMP_GEN_ALL_CORE
This document is complete and the patient is ready for discharge.
EOMI; PERRL; no drainage or redness

## 2024-09-17 NOTE — DISCHARGE NOTE PROVIDER - PROVIDER TOKENS
PROVIDER:[TOKEN:[[id number]],FOLLOWUP:[1 week]],PROVIDER:[TOKEN:[[id number]],FOLLOWUP:[1 month]]

## 2024-09-17 NOTE — DISCHARGE NOTE PROVIDER - HOSPITAL COURSE
64M with T2DM, ESRD on HD MWF, CAD s/p stents, HTN, HLD presents to ED for fatigue and muscle weakness x 5 days, found to be COVID-positive at  advised to come to ED for IV remdesivir.  CT Chest non contrast:  Left lower lobe central predominant mild ground-glass opacities. Small   bilateral pleural effusions, greater on the right. Unchanged trace   pericardial fluid. Cholelithiasis. CXR : Mild pulmonary venous congestion/perihilar interstitial edema, new. Right perihilar/lower lobe interstitial infiltrates again evident.  Patient was started on Remdesir, home meds were continued.  Patient was seen and evaluated by Endocrine for management of patient's insulin dose adjustment. Patient will be discharge on  his Troujo 24 units at hs, Trulicity 0.75 mg po and Humalog 10 units pre meal. Patient will follow up outpatient with Dr. Hernandez.  Patient was also seen by Nephology for resuming of his HD  on M/WF schedule which was continued during hospitalization. Outpatient follow up with his nephrologist.  Patient is medically cleared for discharge     Important Medication Changes and Reason:  Resume Trulicity 0.75 mg sq weekly   Humalog pre-meal 10 units before meals 3 times a day    Active or Pending Issues Requiring Follow-up:  Outpatient follow up with Endocrine   Outpatient follow up with PMD   Outpatient follow up with Nephology     Advanced Directives:   [x] Full code  [ ] DNR  [ ] Hospice     Diagnosis    Covid 19     DMT2    ESRD on HD     HTN    HLD

## 2024-09-17 NOTE — DISCHARGE NOTE PROVIDER - NSDCFUADDAPPT_GEN_ALL_CORE_FT
APPTS ARE READY TO BE MADE: [x] YES    Best Family or Patient Contact (if needed):    Additional Information about above appointments (if needed):    1:   2:   3:     Other comments or requests:    APPTS ARE READY TO BE MADE: [x] YES    Best Family or Patient Contact (if needed):    Additional Information about above appointments (if needed):    1:   2:   3:     Other comments or requests:     ENDOCRINOLOGY:  Prior to outreaching the patient, it was visible that the patient has secured a follow up appointment which was not scheduled by our team. Emily # [phone]  Dr. Steve Hernandez MD 11/26/2024 at 11:20 AM  TASK SENT TO ENDOCRINOLOGY TEAM REQUESTING SOONER APPOINTMENT.

## 2024-09-17 NOTE — PROGRESS NOTE ADULT - ASSESSMENT
64M with T2DM, ESRD on HD MWF, CAD s/p stents, HTN, HLD admitted for COVID, receiving remdesivir. Endocrine consulted for uncontrolled T2DM with hyperglycemia and hypoglycemia (high risk patient with severely uncontrolled Type 2 DM w/ hyperglycemia with A1c of 10.2% at high risk of CAD and CVA with high medical complexity and high level decision-making). BG Goal 100-180mg/dl   Last 24 hour BGs  with hypoglycemia yesterday ( BG 66) at prelunch and hyperglycemia at HS  ( 212) and fasting  at goal. Fasting BGs trending down. Tolerating POs, eats much less while in hospital.           #Uncontrolled T2DM with hyperglycemia and hypoglycemia  T2DM was diagnosed more than 20+ years  Complicated by CAD s/p stents, diabetic retinopathy, neuropathy, nephropathy progressed to ESRD on HD.  Endocrinologist: Dr. Hernandez, Last saw NP Sari Delacruzr 8/20/24  Home regimen: Toujeo 24 units QHS, Humalog 12 units TIDAC + sliding scale. Reports hasn't taken trulicity for 1 month because of "laziness," he does not recall his last trulicity dose.  A1c: 10.2%  Diet: Reports he eats Chinese food like rice, vegetables, meat. Eats lots of fruit.    Basal/bolus insulin was held since admission 9/12 to 9/15 causing hyperglycemia. Started on home basal insulin last night and bolus insulin this morning and had hypoglycemia post-breakfast. Hypoglycemia possibly due to decreased insulin requirements with inpatient diet vs home diet.    Inpatient plan:  - Inpatient BG goal 100-180  - Fasting BG trending down, Would decrease Lantus to 21 units at HS tonight preventively   - Would increase Admelog to 8 units TIDAC; HOLD IF NPO OR IF EATING LESS THAN 50% OF TRAY  - Low dose admelog correction scale TIDAC  - Low dose admelog correction scale QHS  - CC diet  - Please keep hypoglycemia protocol in place     Discharge plan:  - Discharge regimen: Home Basal/bolus insulin, dose TBD. Restart trulicity at 0.75mg weekly on discharge.  - Endocrine follow up: Dr. Hernandez  - Routine ophthalmology and podiatry follow up    #HTN  - goal <130/80  - on carvedilol 6.25mg BID, valsartan 40mg daily  - management by primary team/neprhology    #HLD  - continue atorvastatin 80mg qhs  - check lipid panel outpatient    Contact via Microsoft Teams during business hours  To reach covering provider access AMION via sunrise tools  For Urgent matters/after-hours/weekends/holidays please page endocrine fellow on call   For nonurgent matters please email [email or web address]    Please note that this patient may be followed by different provider tomorrow.  Notify endocrine 24 hours prior to discharge for final recommendations 64M with T2DM, ESRD on HD MWF, CAD s/p stents, HTN, HLD admitted for COVID, receiving remdesivir. Endocrine consulted for uncontrolled T2DM with hyperglycemia and hypoglycemia (high risk patient with severely uncontrolled Type 2 DM w/ hyperglycemia with A1c of 10.2% at high risk of CAD and CVA with high medical complexity and high level decision-making). BG Goal 100-180mg/dl   Last 24 hour BGs  with hypoglycemia yesterday ( BG 66) at prelunch and hyperglycemia at HS  ( 212) and fasting  at goal. Fasting BGs trending down. Tolerating POs, eats much less while in hospital.           #Uncontrolled T2DM with hyperglycemia and hypoglycemia  T2DM was diagnosed more than 20+ years  Complicated by CAD s/p stents, diabetic retinopathy, neuropathy, nephropathy progressed to ESRD on HD.  Endocrinologist: Dr. Hernandez, Last saw NP Sari Delacruzr 8/20/24  Home regimen: Toujeo 24 units QHS, Humalog 12 units TIDAC + sliding scale. Reports hasn't taken trulicity for 1 month because of "laziness," he does not recall his last trulicity dose. Use Freestyle Homer 2   A1c: 10.2%  Diet: Reports he eats Chinese food like rice, vegetables, meat. Eats lots of fruit.    Basal/bolus insulin was held since admission 9/12 to 9/15 causing hyperglycemia. Started on home basal insulin last night and bolus insulin this morning and had hypoglycemia post-breakfast. Hypoglycemia possibly due to decreased insulin requirements with inpatient diet vs home diet.    Inpatient plan:  - Inpatient BG goal 100-180  - Fasting BG trending down, Would decrease Lantus to 21 units at HS tonight preventively   - Would increase Admelog to 8 units TIDAC; HOLD IF NPO OR IF EATING LESS THAN 50% OF TRAY  - Low dose admelog correction scale TIDAC  - Low dose admelog correction scale QHS  - CC diet  - Please keep hypoglycemia protocol in place     Discharge plan:  - Discharge regimen: Home Basal/bolus insulin and Restart trulicity at 0.75mg weekly on discharge.  Recommend Toujeo 21 units at HS  Recommend Humalog 8 units before each meals  ( Hold if not eating )  Restart Trulicity 0.75 mg weekly   Please make suer pt has DM supplies ( Glucometer, Lancets, Strips and alcohol pads)  Please make sure pt has all DM Rx ( Toujeo pen, Humalog pen, pen needles, Trulicity 0.75 mg SQ weekly )   - Endocrine follow up: Dr. Hernandez  - Routine ophthalmology and podiatry follow up    #HTN  - goal <130/80  - on carvedilol 6.25mg BID, valsartan 40mg daily  - management by primary team/neprhology    #HLD  - continue atorvastatin 80mg qhs  - check lipid panel outpatient    Contact via Microsoft Teams during business hours  To reach covering provider access AMION via EpiEP  For Urgent matters/after-hours/weekends/holidays please page endocrine fellow on call   For nonurgent matters please email [email or web address]    Please note that this patient may be followed by different provider tomorrow.  Notify endocrine 24 hours prior to discharge for final recommendations 64M with T2DM, ESRD on HD MWF, CAD s/p stents, HTN, HLD admitted for COVID, receiving remdesivir. Endocrine consulted for uncontrolled T2DM with hyperglycemia and hypoglycemia (high risk patient with severely uncontrolled Type 2 DM w/ hyperglycemia with A1c of 10.2% at high risk of CAD and CVA with high medical complexity and high level decision-making). BG Goal 100-180mg/dl   Last 24 hour BGs  with hypoglycemia yesterday ( BG 66) at prelunch and hyperglycemia at HS  ( 212) and fasting  at goal. Fasting BGs trending down. Tolerating POs, eats much less while in hospital.           #Uncontrolled T2DM with hyperglycemia and hypoglycemia  T2DM was diagnosed more than 20+ years  Complicated by CAD s/p stents, diabetic retinopathy, neuropathy, nephropathy progressed to ESRD on HD.  Endocrinologist: Dr. Hernandez, Last saw NP Sari Delacruzr 8/20/24  Home regimen: Toujeo 24 units QHS, Humalog 12 units TIDAC + sliding scale. Reports hasn't taken trulicity for 1 month because of "laziness," he does not recall his last trulicity dose. Use Freestyle Homer 2   A1c: 10.2%  Diet: Reports he eats Chinese food like rice, vegetables, meat. Eats lots of fruit.    Basal/bolus insulin was held since admission 9/12 to 9/15 causing hyperglycemia. Started on home basal insulin last night and bolus insulin this morning and had hypoglycemia post-breakfast. Hypoglycemia possibly due to decreased insulin requirements with inpatient diet vs home diet.    Inpatient plan:  - Inpatient BG goal 100-180  - Fasting BG trending down, Would decrease Lantus to 21 units at HS tonight preventively   - Would increase Admelog to 8 units TIDAC; HOLD IF NPO OR IF EATING LESS THAN 50% OF TRAY  - Low dose admelog correction scale TIDAC  - Low dose admelog correction scale QHS  - CC diet  - Please keep hypoglycemia protocol in place     Discharge plan:  - Discharge regimen: Home Basal/bolus insulin and Restart trulicity at 0.75mg weekly on discharge.  Recommend Toujeo 21 units at HS  Recommend Humalog 8 units before each meals  ( Hold if not eating )  Restart Trulicity 0.75 mg weekly   pt should check BGs ACTID and at HS, and when CGM ( Freestyle Homer 2 ) alarms go off. Contact endocrinologist if BG < 70 X1, > 400 X1, or persistently >200  Please make suer pt has DM supplies ( Glucometer, Lancets, Strips and alcohol pads)  Please make sure pt has all DM Rx ( Toujeo pen, Humalog pen, pen needles, Trulicity 0.75 mg SQ weekly )   - Endocrine follow up: Dr. Hernandez. Has appointment on 11/26/24 at 11:20   - Routine ophthalmology and podiatry follow up    #HTN  - goal <130/80  - on carvedilol 6.25mg BID, valsartan 40mg daily  - management by primary team/neprhology    #HLD  - continue atorvastatin 80mg qhs  - check lipid panel outpatient    Contact via Microsoft Teams during business hours  To reach covering provider access AMION via sunrise tools  For Urgent matters/after-hours/weekends/holidays please page endocrine fellow on call   For nonurgent matters please email [email or web address]    Please note that this patient may be followed by different provider tomorrow.  Notify endocrine 24 hours prior to discharge for final recommendations

## 2024-09-17 NOTE — PROGRESS NOTE ADULT - ASSESSMENT
64   yr      hx CAD   s/p  2 drug eluding stents 11/2022       CHF, DM, gout, ESRD on dialysis      c/c  weakness  of   limbs/  neuro dr mccain/ h/o   gout, right  wrist,  has  functional  quadriplegia/  CT  head, . 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      pt  with  h/o intermittent  right  arm /leg weakness  has been   c/c  for  yrs/  with  unclear  etiology / MRI  head/  c spine in 10/22,  no cord  compression       seen by neuro in  past,   per  wife,  thinks  pt  has  a psychological  component  regarding  his  weakness/  s/p Strep pneumoniae  bacteremia  and pna           now admitted    with sob/ from covid/  fluid  overload          covid/  cxr , infiltrates , on iv remdisivir     Gout     CAD,    s/p    pci.        on  asa/ plavix/   lipitor,       Echo,  ef  45. on 5/.23     CKD, on HD / c/c  anemia           renal  dr dickerson      c/c  anemia     DM,  follow  fs           on lantus/  endo  f/p     on  dvt ppx/ gabapentin,  asa. / plavix,  lipitor,  coreg., valsartan,  lantus,  zolofr. allopurinol     o2  sat  94,   completed  iv remdisivir       endo   to  f/o,  fs  noted  yesterday,  needs   furthe r titration .  defer  to  endo        rad< from: Xray Chest 1 View- PORTABLE-Urgent (Xray Chest 1 View- PORTABLE-Urgent .) (09.12.24 @ 14:13) >  MPRESSION:  Mild pulmonary venous congestion/perihilar interstitial edema, new.  Right perihilar/lower lobe interstitial infiltrates again evident.  --- End of Report ---

## 2024-09-17 NOTE — DISCHARGE NOTE PROVIDER - NSDCCPCAREPLAN_GEN_ALL_CORE_FT
PRINCIPAL DISCHARGE DIAGNOSIS  Diagnosis: COVID-19  Assessment and Plan of Treatment:       SECONDARY DISCHARGE DIAGNOSES  Diagnosis: Uncontrolled type 2 diabetes mellitus with hyperglycemia  Assessment and Plan of Treatment:     Diagnosis: HTN (hypertension)  Assessment and Plan of Treatment: Follow up with your medical doctor to establish long term blood pressure treatment goals.      Diagnosis: HLD (hyperlipidemia)  Assessment and Plan of Treatment:     Diagnosis: Hypoglycemia  Assessment and Plan of Treatment:      PRINCIPAL DISCHARGE DIAGNOSIS  Diagnosis: COVID-19  Assessment and Plan of Treatment: You tested positive for COVID 19.  You no longer require hospitalization.  Complete abx as recommended if prescribed for home. Quarantine yourself for 14 days  Please follow up with your PCP in 1-2 weeks -Call your Provider before hand to make then aware of your hospitalization   Take Tylenol for Fevers every 6 hours as needed- Do not exceed 4gm of Tylenol in a 24 hour period  Stay hydrated   WEAR A FACE MASK   Cover your cough and sneezes   Clean your hands often   Avoid sharing personal house hold items   Clean all high touch surfaces- everyday items like table tops , door knobs, cell phones etc   You should restrict activities outside your home except for getting medical care   Avoid using public transportation  Do not go to work, school, or Public areas   Monitor your oxygen saturation   Call [facility] at [phone]        SECONDARY DISCHARGE DIAGNOSES  Diagnosis: Uncontrolled type 2 diabetes mellitus with hyperglycemia  Assessment and Plan of Treatment: Outpatient ffollow up with your Endocrinoogist   Continue Toujo 24 units at HS   Continue Premeals Insulin at 10 units   Continue Trulitiy 0.75 mg weekly   Use your Insulin sliding scale    Diagnosis: HTN (hypertension)  Assessment and Plan of Treatment: Follow up with your medical doctor to establish long term blood pressure treatment goals.  Continue Valsartan  Continue Coreg 6.25 mg po every 12 hours       Diagnosis: HLD (hyperlipidemia)  Assessment and Plan of Treatment: Continue Lipitor

## 2024-09-18 ENCOUNTER — TRANSCRIPTION ENCOUNTER (OUTPATIENT)
Age: 64
End: 2024-09-18

## 2024-09-18 VITALS
TEMPERATURE: 98 F | OXYGEN SATURATION: 99 % | DIASTOLIC BLOOD PRESSURE: 58 MMHG | RESPIRATION RATE: 16 BRPM | SYSTOLIC BLOOD PRESSURE: 131 MMHG | HEART RATE: 82 BPM

## 2024-09-18 LAB
ANION GAP SERPL CALC-SCNC: 19 MMOL/L — HIGH (ref 5–17)
BUN SERPL-MCNC: 86 MG/DL — HIGH (ref 7–23)
CALCIUM SERPL-MCNC: 7.6 MG/DL — LOW (ref 8.4–10.5)
CHLORIDE SERPL-SCNC: 94 MMOL/L — LOW (ref 96–108)
CO2 SERPL-SCNC: 22 MMOL/L — SIGNIFICANT CHANGE UP (ref 22–31)
CREAT SERPL-MCNC: 11.12 MG/DL — HIGH (ref 0.5–1.3)
EGFR: 5 ML/MIN/1.73M2 — LOW
GLUCOSE BLDC GLUCOMTR-MCNC: 134 MG/DL — HIGH (ref 70–99)
GLUCOSE BLDC GLUCOMTR-MCNC: 192 MG/DL — HIGH (ref 70–99)
GLUCOSE BLDC GLUCOMTR-MCNC: 208 MG/DL — HIGH (ref 70–99)
GLUCOSE SERPL-MCNC: 255 MG/DL — HIGH (ref 70–99)
HCT VFR BLD CALC: 26.2 % — LOW (ref 39–50)
HGB BLD-MCNC: 8.8 G/DL — LOW (ref 13–17)
MCHC RBC-ENTMCNC: 31.5 PG — SIGNIFICANT CHANGE UP (ref 27–34)
MCHC RBC-ENTMCNC: 33.6 GM/DL — SIGNIFICANT CHANGE UP (ref 32–36)
MCV RBC AUTO: 93.9 FL — SIGNIFICANT CHANGE UP (ref 80–100)
NRBC # BLD: 0 /100 WBCS — SIGNIFICANT CHANGE UP (ref 0–0)
PLATELET # BLD AUTO: 208 K/UL — SIGNIFICANT CHANGE UP (ref 150–400)
POTASSIUM SERPL-MCNC: 3.8 MMOL/L — SIGNIFICANT CHANGE UP (ref 3.5–5.3)
POTASSIUM SERPL-SCNC: 3.8 MMOL/L — SIGNIFICANT CHANGE UP (ref 3.5–5.3)
RBC # BLD: 2.79 M/UL — LOW (ref 4.2–5.8)
RBC # FLD: 14 % — SIGNIFICANT CHANGE UP (ref 10.3–14.5)
SODIUM SERPL-SCNC: 135 MMOL/L — SIGNIFICANT CHANGE UP (ref 135–145)
WBC # BLD: 7.3 K/UL — SIGNIFICANT CHANGE UP (ref 3.8–10.5)
WBC # FLD AUTO: 7.3 K/UL — SIGNIFICANT CHANGE UP (ref 3.8–10.5)

## 2024-09-18 PROCEDURE — 99232 SBSQ HOSP IP/OBS MODERATE 35: CPT

## 2024-09-18 RX ORDER — CARVEDILOL 6.25 MG/1
1 TABLET ORAL
Qty: 60 | Refills: 0
Start: 2024-09-18 | End: 2024-10-17

## 2024-09-18 RX ORDER — ACETAMINOPHEN 325 MG/1
650 TABLET ORAL EVERY 6 HOURS
Refills: 0 | Status: DISCONTINUED | OUTPATIENT
Start: 2024-09-18 | End: 2024-09-18

## 2024-09-18 RX ORDER — ACETAMINOPHEN 325 MG/1
2 TABLET ORAL
Qty: 0 | Refills: 0 | DISCHARGE
Start: 2024-09-18

## 2024-09-18 RX ORDER — INSULIN GLARGINE 100 [IU]/ML
24 INJECTION, SOLUTION SUBCUTANEOUS
Qty: 5 | Refills: 0
Start: 2024-09-18 | End: 2024-10-17

## 2024-09-18 RX ORDER — INSULIN GLARGINE 100 [IU]/ML
24 INJECTION, SOLUTION SUBCUTANEOUS AT BEDTIME
Refills: 0 | Status: DISCONTINUED | OUTPATIENT
Start: 2024-09-18 | End: 2024-09-18

## 2024-09-18 RX ADMIN — Medication 40 MILLIGRAM(S): at 05:54

## 2024-09-18 RX ADMIN — Medication 1: at 17:29

## 2024-09-18 RX ADMIN — Medication 8 UNIT(S): at 12:11

## 2024-09-18 RX ADMIN — Medication 100 MILLIGRAM(S): at 12:12

## 2024-09-18 RX ADMIN — CALCIUM ACETATE 667 MILLIGRAM(S): 667 CAPSULE ORAL at 08:29

## 2024-09-18 RX ADMIN — CALCIUM ACETATE 667 MILLIGRAM(S): 667 CAPSULE ORAL at 12:12

## 2024-09-18 RX ADMIN — ACETAMINOPHEN 650 MILLIGRAM(S): 325 TABLET ORAL at 12:11

## 2024-09-18 RX ADMIN — Medication 75 MILLIGRAM(S): at 12:13

## 2024-09-18 RX ADMIN — Medication 1 MILLIGRAM(S): at 12:12

## 2024-09-18 RX ADMIN — SERTRALINE HYDROCHLORIDE 25 MILLIGRAM(S): 50 TABLET, FILM COATED ORAL at 12:12

## 2024-09-18 RX ADMIN — EPOETIN ALFA 10000 UNIT(S): 3000 SOLUTION INTRAVENOUS; SUBCUTANEOUS at 09:58

## 2024-09-18 RX ADMIN — Medication 8 UNIT(S): at 08:28

## 2024-09-18 RX ADMIN — Medication 2: at 08:28

## 2024-09-18 RX ADMIN — CALCIUM ACETATE 667 MILLIGRAM(S): 667 CAPSULE ORAL at 17:31

## 2024-09-18 RX ADMIN — CARVEDILOL 6.25 MILLIGRAM(S): 6.25 TABLET ORAL at 05:53

## 2024-09-18 RX ADMIN — VALSARTAN 40 MILLIGRAM(S): 40 TABLET ORAL at 05:54

## 2024-09-18 RX ADMIN — Medication 81 MILLIGRAM(S): at 12:12

## 2024-09-18 RX ADMIN — Medication 8 UNIT(S): at 17:32

## 2024-09-18 NOTE — PROGRESS NOTE ADULT - SUBJECTIVE AND OBJECTIVE BOX
Date of Service: 09-14-24 @ 11:34           CARDIOLOGY     PROGRESS  NOTE   ________________________________________________    CHIEF COMPLAINT:Patient is a 64y old  Male who presents with a chief complaint of sob (14 Sep 2024 08:38)  no complain  	  REVIEW OF SYSTEMS:  CONSTITUTIONAL: No fever, weight loss, or fatigue  EYES: No eye pain, visual disturbances, or discharge  ENT:  No difficulty hearing, tinnitus, vertigo; No sinus or throat pain  NECK: No pain or stiffness  RESPIRATORY: +cough,  no wheezing, chills or hemoptysis; No Shortness of Breath  CARDIOVASCULAR: No chest pain, palpitations, passing out, dizziness, or leg swelling  GASTROINTESTINAL: No abdominal or epigastric pain. No nausea, vomiting, or hematemesis; No diarrhea or constipation. No melena or hematochezia.  GENITOURINARY: No dysuria, frequency, hematuria, or incontinence  NEUROLOGICAL: No headaches, memory loss, loss of strength, numbness, or tremors  SKIN: No itching, burning, rashes, or lesions   LYMPH Nodes: No enlarged glands  ENDOCRINE: No heat or cold intolerance; No hair loss  MUSCULOSKELETAL: No joint pain or swelling; No muscle, back, or extremity pain  PSYCHIATRIC: No depression, anxiety, mood swings, or difficulty sleeping  HEME/LYMPH: No easy bruising, or bleeding gums  ALLERGY AND IMMUNOLOGIC: No hives or eczema	    [ x] All others negative	  [ ] Unable to obtain    PHYSICAL EXAM:  T(C): 37.1 (09-14-24 @ 04:48), Max: 37.1 (09-13-24 @ 14:10)  HR: 80 (09-14-24 @ 04:48) (78 - 88)  BP: 148/79 (09-14-24 @ 04:48) (143/80 - 159/70)  RR: 18 (09-14-24 @ 04:48) (18 - 18)  SpO2: 94% (09-14-24 @ 04:48) (94% - 96%)  Wt(kg): --  I&O's Summary    13 Sep 2024 07:01  -  14 Sep 2024 07:00  --------------------------------------------------------  IN: 480 mL / OUT: 3250 mL / NET: -2770 mL        Appearance: Normal	  HEENT:   Normal oral mucosa, PERRL, EOMI	  Lymphatic: No lymphadenopathy  Cardiovascular: Normal S1 S2, No JVD, + murmurs, No edema  Respiratory: rhonchi  Psychiatry: A & O x 3, Mood & affect appropriate  Gastrointestinal:  Soft, Non-tender, + BS	  Skin: No rashes, No ecchymoses, No cyanosis	  Neurologic: Non-focal  Extremities: Normal range of motion, No clubbing, cyanosis or edema  Vascular: Peripheral pulses palpable 2+ bilaterally    MEDICATIONS  (STANDING):  allopurinol 100 milliGRAM(s) Oral daily  aspirin enteric coated 81 milliGRAM(s) Oral daily  atorvastatin 80 milliGRAM(s) Oral at bedtime  carvedilol 6.25 milliGRAM(s) Oral every 12 hours  clopidogrel Tablet 75 milliGRAM(s) Oral daily  dextrose 5%. 1000 milliLiter(s) (50 mL/Hr) IV Continuous <Continuous>  dextrose 5%. 1000 milliLiter(s) (100 mL/Hr) IV Continuous <Continuous>  dextrose 50% Injectable 25 Gram(s) IV Push once  dextrose 50% Injectable 12.5 Gram(s) IV Push once  dextrose 50% Injectable 25 Gram(s) IV Push once  epoetin wayne-epbx (RETACRIT) Injectable 74181 Unit(s) IV Push once  folic acid 1 milliGRAM(s) Oral daily  gabapentin 100 milliGRAM(s) Oral daily  glucagon  Injectable 1 milliGRAM(s) IntraMuscular once  heparin   Injectable 5000 Unit(s) SubCutaneous every 8 hours  insulin lispro (ADMELOG) corrective regimen sliding scale   SubCutaneous at bedtime  insulin lispro (ADMELOG) corrective regimen sliding scale   SubCutaneous three times a day before meals  pantoprazole    Tablet 40 milliGRAM(s) Oral before breakfast  remdesivir  IVPB 100 milliGRAM(s) IV Intermittent every 24 hours  remdesivir  IVPB   IV Intermittent   senna 2 Tablet(s) Oral at bedtime  sertraline 25 milliGRAM(s) Oral daily  valsartan 40 milliGRAM(s) Oral daily      TELEMETRY: 	    ECG:  	  RADIOLOGY:  OTHER: 	  	  LABS:	 	    CARDIAC MARKERS:                          10.0   10.87 )-----------( 178      ( 13 Sep 2024 07:17 )             31.3     09-13    138  |  97  |  61<H>  ----------------------------<  270<H>  3.5   |  21<L>  |  9.28<H>    Ca    8.2<L>      13 Sep 2024 07:15    TPro  6.7  /  Alb  3.6  /  TBili  0.4  /  DBili  x   /  AST  10  /  ALT  9<L>  /  AlkPhos  72  09-13    proBNP:   Lipid Profile:   HgA1c:   TSH:        1. The left ventricular systolic function is mildly decreased with an ejection fraction visually estimated at 45 to 50 %. There are regional wall motion abnormalities.   2. Mid and apical anterior septum and apex are abnormal.   3. Normal systolic function. The tricuspid annular plane systolic excursion (TAPSE) is 1.8 cm (normal >=1.7 cm).   4. No prior echocardiogram is available for comparison.      Assessment and plan  ---------------------------  64-year male past medical history ESRD MWF, CAD s/p stents, HTN, HLD, DM presents to ED for fatigue and muscle weakness x 5 days.  Patient COVID-positive at  advised to come to ED for IV remdesivir.  Completed dialysis yesterday.  Denies use of Tylenol for symptomatic relief.  Denies nausea, vomiting, chest pain, SOB, abdominal pain, urinary symptoms, change in bowel movement.  No recent travel or sick contacts  pt with sig cardiac and medical history with fatigue, sob nad generalized weakness pt  COVID +   start RDV  add steroid if hypoxemia  will consider possible ID eval  chest x ray noted will check ct chest no contrast  ASHD ischemic cardiomyopathy, continue all cardiac meds  r/o chf, fluid overload repeat echo  ESRD on HD, renal eval  continue all cardiac meds  DM fs with coverage  dvt prophylaxis  increase bp will increase valsartan if bp remains elevated  s/p HD  TTE ordered  continue RDV/ cardiac meds  oob to the chair       	        
Date of Service: 09-15-24 @ 15:21           CARDIOLOGY     PROGRESS  NOTE   ________________________________________________    CHIEF COMPLAINT:Patient is a 64y old  Male who presents with a chief complaint of sob (15 Sep 2024 11:18)  no complain  	  REVIEW OF SYSTEMS:  CONSTITUTIONAL: No fever, weight loss, or fatigue  EYES: No eye pain, visual disturbances, or discharge  ENT:  No difficulty hearing, tinnitus, vertigo; No sinus or throat pain  NECK: No pain or stiffness  RESPIRATORY: No cough, wheezing, chills or hemoptysis; No Shortness of Breath  CARDIOVASCULAR: No chest pain, palpitations, passing out, dizziness, or leg swelling  GASTROINTESTINAL: No abdominal or epigastric pain. No nausea, vomiting, or hematemesis; No diarrhea or constipation. No melena or hematochezia.  GENITOURINARY: No dysuria, frequency, hematuria, or incontinence  NEUROLOGICAL: No headaches, memory loss, loss of strength, numbness, or tremors  SKIN: No itching, burning, rashes, or lesions   LYMPH Nodes: No enlarged glands  ENDOCRINE: No heat or cold intolerance; No hair loss  MUSCULOSKELETAL: No joint pain or swelling; No muscle, back, or extremity pain  PSYCHIATRIC: No depression, anxiety, mood swings, or difficulty sleeping  HEME/LYMPH: No easy bruising, or bleeding gums  ALLERGY AND IMMUNOLOGIC: No hives or eczema	    [x ] All others negative	  [ ] Unable to obtain    PHYSICAL EXAM:  T(C): 36.6 (09-15-24 @ 12:30), Max: 36.8 (09-14-24 @ 20:06)  HR: 76 (09-15-24 @ 12:30) (75 - 76)  BP: 140/65 (09-15-24 @ 12:30) (140/65 - 157/73)  RR: 18 (09-15-24 @ 12:30) (18 - 18)  SpO2: 97% (09-15-24 @ 12:30) (95% - 97%)  Wt(kg): --  I&O's Summary    14 Sep 2024 07:01  -  15 Sep 2024 07:00  --------------------------------------------------------  IN: 534 mL / OUT: 0 mL / NET: 534 mL        Appearance: Normal	  HEENT:   Normal oral mucosa, PERRL, EOMI	  Lymphatic: No lymphadenopathy  Cardiovascular: Normal S1 S2, No JVD, + murmurs, No edema  Respiratory: rhonchi  Psychiatry: A & O x 3, Mood & affect appropriate  Gastrointestinal:  Soft, Non-tender, + BS	  Skin: No rashes, No ecchymoses, No cyanosis	  Extremities: Normal range of motion, No clubbing, cyanosis or edema  Vascular: Peripheral pulses palpable 2+ bilaterally    MEDICATIONS  (STANDING):  allopurinol 100 milliGRAM(s) Oral daily  aspirin enteric coated 81 milliGRAM(s) Oral daily  atorvastatin 80 milliGRAM(s) Oral at bedtime  calcium acetate 667 milliGRAM(s) Oral three times a day with meals  carvedilol 6.25 milliGRAM(s) Oral every 12 hours  clopidogrel Tablet 75 milliGRAM(s) Oral daily  dextrose 5%. 1000 milliLiter(s) (50 mL/Hr) IV Continuous <Continuous>  dextrose 5%. 1000 milliLiter(s) (100 mL/Hr) IV Continuous <Continuous>  dextrose 50% Injectable 25 Gram(s) IV Push once  dextrose 50% Injectable 12.5 Gram(s) IV Push once  dextrose 50% Injectable 25 Gram(s) IV Push once  epoetin wayne-epbx (RETACRIT) Injectable 68352 Unit(s) IV Push once  folic acid 1 milliGRAM(s) Oral daily  gabapentin 100 milliGRAM(s) Oral daily  glucagon  Injectable 1 milliGRAM(s) IntraMuscular once  heparin   Injectable 5000 Unit(s) SubCutaneous every 8 hours  insulin lispro (ADMELOG) corrective regimen sliding scale   SubCutaneous at bedtime  insulin lispro (ADMELOG) corrective regimen sliding scale   SubCutaneous three times a day before meals  pantoprazole    Tablet 40 milliGRAM(s) Oral before breakfast  remdesivir  IVPB 100 milliGRAM(s) IV Intermittent every 24 hours  remdesivir  IVPB   IV Intermittent   senna 2 Tablet(s) Oral at bedtime  sertraline 25 milliGRAM(s) Oral daily  valsartan 40 milliGRAM(s) Oral daily      TELEMETRY: 	    ECG:  	  RADIOLOGY:  OTHER: 	  	  LABS:	 	    CARDIAC MARKERS:                                9.5    7.36  )-----------( 190      ( 15 Sep 2024 06:54 )             30.3     09-15    139  |  96  |  69<H>  ----------------------------<  289<H>  4.2   |  20<L>  |  9.90<H>    Ca    7.8<L>      15 Sep 2024 06:54  Phos  7.2     09-15  Mg     2.3     09-15      proBNP:   Lipid Profile:   HgA1c:   TSH:     < from: TTE W or WO Ultrasound Enhancing Agent (05.24.23 @ 12:00) >  1. The left ventricular systolic function is mildly decreased with an ejection fraction visually estimated at 45 to 50 %. There are regional wall motion abnormalities.   2. Mid and apical anterior septum and apex are abnormal.   3. Normal systolic function. The tricuspid annular plane systolic excursion (TAPSE) is 1.8 cm (normal >=1.7 cm).   4. No prior echocardiogram is available for comparison.        Assessment and plan  ---------------------------  64-year male past medical history ESRD MWF, CAD s/p stents, HTN, HLD, DM presents to ED for fatigue and muscle weakness x 5 days.  Patient COVID-positive at  advised to come to ED for IV remdesivir.  Completed dialysis yesterday.  Denies use of Tylenol for symptomatic relief.  Denies nausea, vomiting, chest pain, SOB, abdominal pain, urinary symptoms, change in bowel movement.  No recent travel or sick contacts  pt with sig cardiac and medical history with fatigue, sob nad generalized weakness pt  COVID +   start RDV  add steroid if hypoxemia  will consider possible ID eval  chest x ray noted will check ct chest no contrast  ASHD ischemic cardiomyopathy, continue all cardiac meds  r/o chf, fluid overload repeat echo  ESRD on HD, renal eval  continue all cardiac meds  DM fs with coverage  dvt prophylaxis  increase bp will increase valsartan if bp remains elevated  s/p HD  TTE ordered  continue RDV/ cardiac meds  oob to the chair   doing well  not hypoxemic  ?dc tomorrow after HD    	        
Date of Service: 09-17-24 @ 20:56           CARDIOLOGY     PROGRESS  NOTE   ________________________________________________    CHIEF COMPLAINT:Patient is a 64y old  Male who presents with a chief complaint of sob (17 Sep 2024 17:24)  no complain  	  REVIEW OF SYSTEMS:  CONSTITUTIONAL: No fever, weight loss, or fatigue  EYES: No eye pain, visual disturbances, or discharge  ENT:  No difficulty hearing, tinnitus, vertigo; No sinus or throat pain  NECK: No pain or stiffness  RESPIRATORY: No cough, wheezing, chills or hemoptysis; + Shortness of Breath  CARDIOVASCULAR: No chest pain, palpitations, passing out, dizziness, or leg swelling  GASTROINTESTINAL: No abdominal or epigastric pain. No nausea, vomiting, or hematemesis; No diarrhea or constipation. No melena or hematochezia.  GENITOURINARY: No dysuria, frequency, hematuria, or incontinence  NEUROLOGICAL: No headaches, memory loss, loss of strength, numbness, or tremors  SKIN: No itching, burning, rashes, or lesions   LYMPH Nodes: No enlarged glands  ENDOCRINE: No heat or cold intolerance; No hair loss  MUSCULOSKELETAL: No joint pain or swelling; No muscle, back, or extremity pain  PSYCHIATRIC: No depression, anxiety, mood swings, or difficulty sleeping  HEME/LYMPH: No easy bruising, or bleeding gums  ALLERGY AND IMMUNOLOGIC: No hives or eczema	    [ x] All others negative	  [ ] Unable to obtain    PHYSICAL EXAM:  T(C): 36.9 (09-17-24 @ 20:05), Max: 36.9 (09-17-24 @ 20:05)  HR: 79 (09-17-24 @ 20:05) (71 - 80)  BP: 133/60 (09-17-24 @ 20:05) (133/60 - 137/62)  RR: 18 (09-17-24 @ 20:05) (18 - 18)  SpO2: 95% (09-17-24 @ 20:05) (93% - 99%)  Wt(kg): --  I&O's Summary    16 Sep 2024 07:01  -  17 Sep 2024 07:00  --------------------------------------------------------  IN: 800 mL / OUT: 3600 mL / NET: -2800 mL        Appearance: Normal	  HEENT:   Normal oral mucosa, PERRL, EOMI	  Lymphatic: No lymphadenopathy  Cardiovascular: Normal S1 S2, No JVD, + murmurs, No edema  Respiratory: rhonchi  Psychiatry: A & O x 3, Mood & affect appropriate  Gastrointestinal:  Soft, Non-tender, + BS	  Skin: No rashes, No ecchymoses, No cyanosis	  Extremities: Normal range of motion, No clubbing, cyanosis or edema  Vascular: Peripheral pulses palpable 2+ bilaterally    MEDICATIONS  (STANDING):  allopurinol 100 milliGRAM(s) Oral daily  aspirin enteric coated 81 milliGRAM(s) Oral daily  atorvastatin 80 milliGRAM(s) Oral at bedtime  calcium acetate 667 milliGRAM(s) Oral three times a day with meals  carvedilol 6.25 milliGRAM(s) Oral every 12 hours  clopidogrel Tablet 75 milliGRAM(s) Oral daily  dextrose 5%. 1000 milliLiter(s) (50 mL/Hr) IV Continuous <Continuous>  dextrose 5%. 1000 milliLiter(s) (100 mL/Hr) IV Continuous <Continuous>  dextrose 50% Injectable 25 Gram(s) IV Push once  dextrose 50% Injectable 12.5 Gram(s) IV Push once  dextrose 50% Injectable 25 Gram(s) IV Push once  epoetin wayne-epbx (RETACRIT) Injectable 59528 Unit(s) IV Push once  folic acid 1 milliGRAM(s) Oral daily  gabapentin 100 milliGRAM(s) Oral daily  glucagon  Injectable 1 milliGRAM(s) IntraMuscular once  heparin   Injectable 5000 Unit(s) SubCutaneous every 8 hours  insulin glargine Injectable (LANTUS) 21 Unit(s) SubCutaneous at bedtime  insulin lispro (ADMELOG) corrective regimen sliding scale   SubCutaneous three times a day before meals  insulin lispro (ADMELOG) corrective regimen sliding scale   SubCutaneous at bedtime  insulin lispro Injectable (ADMELOG) 8 Unit(s) SubCutaneous three times a day before meals  pantoprazole    Tablet 40 milliGRAM(s) Oral before breakfast  senna 2 Tablet(s) Oral at bedtime  sertraline 25 milliGRAM(s) Oral daily  valsartan 40 milliGRAM(s) Oral daily      TELEMETRY: 	    ECG:  	  RADIOLOGY:  OTHER: 	  	  LABS:	 	    CARDIAC MARKERS:      proBNP:   Lipid Profile:   HgA1c:   TSH:     < from: TTE W or WO Ultrasound Enhancing Agent (05.24.23 @ 12:00) >   1. The left ventricular systolic function is mildly decreased with an ejection fraction visually estimated at 45 to 50 %. There are regional wall motion abnormalities.   2. Mid and apical anterior septum and apex are abnormal.   3. Normal systolic function. The tricuspid annular plane systolic excursion (TAPSE) is 1.8 cm (normal >=1.7 cm).   4. No prior echocardiogram is available for comparison.        Assessment and plan  ---------------------------  64-year male past medical history ESRD MWF, CAD s/p stents, HTN, HLD, DM presents to ED for fatigue and muscle weakness x 5 days.  Patient COVID-positive at  advised to come to ED for IV remdesivir.  Completed dialysis yesterday.  Denies use of Tylenol for symptomatic relief.  Denies nausea, vomiting, chest pain, SOB, abdominal pain, urinary symptoms, change in bowel movement.  No recent travel or sick contacts  pt with sig cardiac and medical history with fatigue, sob nad generalized weakness pt  COVID +   start RDV  add steroid if hypoxemia  will consider possible ID eval  chest x ray noted will check ct chest no contrast  ASHD ischemic cardiomyopathy, continue all cardiac meds  r/o chf, fluid overload repeat echo  ESRD on HD, renal eval  continue all cardiac meds  DM fs with coverage  dvt prophylaxis  increase bp will increase valsartan if bp remains elevated  s/p HD  TTE ordered  continue RDV/ cardiac meds  oob to the chair   doing well  not hypoxemic  continue current meds, doing better  dc planning  	        
[facility] [phone]        Patient seen and examined in bed.  He was the same   No cough and afebrile         MEDICATIONS  (STANDING):  allopurinol 100 milliGRAM(s) Oral daily  aspirin enteric coated 81 milliGRAM(s) Oral daily  atorvastatin 80 milliGRAM(s) Oral at bedtime  calcium acetate 667 milliGRAM(s) Oral three times a day with meals  carvedilol 6.25 milliGRAM(s) Oral every 12 hours  clopidogrel Tablet 75 milliGRAM(s) Oral daily  dextrose 5%. 1000 milliLiter(s) (50 mL/Hr) IV Continuous <Continuous>  dextrose 5%. 1000 milliLiter(s) (100 mL/Hr) IV Continuous <Continuous>  dextrose 50% Injectable 25 Gram(s) IV Push once  dextrose 50% Injectable 12.5 Gram(s) IV Push once  dextrose 50% Injectable 25 Gram(s) IV Push once  epoetin wayne-epbx (RETACRIT) Injectable 03625 Unit(s) IV Push once  folic acid 1 milliGRAM(s) Oral daily  gabapentin 100 milliGRAM(s) Oral daily  glucagon  Injectable 1 milliGRAM(s) IntraMuscular once  heparin   Injectable 5000 Unit(s) SubCutaneous every 8 hours  insulin glargine Injectable (LANTUS) 21 Unit(s) SubCutaneous at bedtime  insulin lispro (ADMELOG) corrective regimen sliding scale   SubCutaneous three times a day before meals  insulin lispro (ADMELOG) corrective regimen sliding scale   SubCutaneous at bedtime  insulin lispro Injectable (ADMELOG) 8 Unit(s) SubCutaneous three times a day before meals  pantoprazole    Tablet 40 milliGRAM(s) Oral before breakfast  senna 2 Tablet(s) Oral at bedtime  sertraline 25 milliGRAM(s) Oral daily  valsartan 40 milliGRAM(s) Oral daily      VITAL:  T(C): , Max: 36.9 (09-17-24 @ 20:05)  T(F): , Max: 98.4 (09-17-24 @ 20:05)  HR: 72 (09-18-24 @ 04:42)  BP: 144/76 (09-18-24 @ 04:42)  BP(mean): --  RR: 18 (09-18-24 @ 04:42)  SpO2: 95% (09-18-24 @ 04:42)  Wt(kg): --    I and O's:        PHYSICAL EXAM:    Constitutional: NAD  Neck:  No JVD  Respiratory: CTAB/L  Cardiovascular: S1 and S2  Gastrointestinal: BS+, soft, NT/ND  Extremities: No peripheral edema  Neurological: A/O x 3, no focal deficits  Psychiatric: Normal mood, normal affect  : No Jay  Skin: No rashes  Access: Not applicable    LABS:                Urine Studies:          RADIOLOGY & ADDITIONAL STUDIES:            
  date of service: 09-16-24 @ 06:03  [facility]  REVIEW OF SYSTEMS:  CONSTITUTIONAL: No fever,  no  weight loss  ENT:  No  tinnitus,   no   vertigo  NECK: No pain or stiffness  RESPIRATORY: No cough, wheezing, chills or hemoptysis;    No Shortness of Breath  CARDIOVASCULAR: No chest pain, palpitations, dizziness  GASTROINTESTINAL: No abdominal or epigastric pain. No nausea, vomiting, or hematemesis; No diarrhea  No melena or hematochezia.  GENITOURINARY: No dysuria, frequency, hematuria, or incontinence  NEUROLOGICAL: No headaches  SKIN: No itching,  no   rash  LYMPH Nodes: No enlarged glands  ENDOCRINE: No heat or cold intolerance  MUSCULOSKELETAL: No joint pain or swelling  PSYCHIATRIC: No depression, anxiety  HEME/LYMPH: No easy bruising, or bleeding gums  ALLERGY AND IMMUNOLOGIC: No hives or eczema	    MEDICATIONS  (STANDING):  allopurinol 100 milliGRAM(s) Oral daily  aspirin enteric coated 81 milliGRAM(s) Oral daily  atorvastatin 80 milliGRAM(s) Oral at bedtime  calcium acetate 667 milliGRAM(s) Oral three times a day with meals  carvedilol 6.25 milliGRAM(s) Oral every 12 hours  clopidogrel Tablet 75 milliGRAM(s) Oral daily  dextrose 5%. 1000 milliLiter(s) (100 mL/Hr) IV Continuous <Continuous>  dextrose 5%. 1000 milliLiter(s) (50 mL/Hr) IV Continuous <Continuous>  dextrose 50% Injectable 25 Gram(s) IV Push once  dextrose 50% Injectable 25 Gram(s) IV Push once  dextrose 50% Injectable 12.5 Gram(s) IV Push once  epoetin wayne-epbx (RETACRIT) Injectable 60844 Unit(s) IV Push once  folic acid 1 milliGRAM(s) Oral daily  gabapentin 100 milliGRAM(s) Oral daily  glucagon  Injectable 1 milliGRAM(s) IntraMuscular once  heparin   Injectable 5000 Unit(s) SubCutaneous every 8 hours  insulin glargine Injectable (LANTUS) 24 Unit(s) SubCutaneous at bedtime  insulin lispro (ADMELOG) corrective regimen sliding scale   SubCutaneous three times a day before meals  insulin lispro (ADMELOG) corrective regimen sliding scale   SubCutaneous at bedtime  insulin lispro Injectable (ADMELOG) 12 Unit(s) SubCutaneous three times a day before meals  pantoprazole    Tablet 40 milliGRAM(s) Oral before breakfast  remdesivir  IVPB   IV Intermittent   remdesivir  IVPB 100 milliGRAM(s) IV Intermittent every 24 hours  senna 2 Tablet(s) Oral at bedtime  sertraline 25 milliGRAM(s) Oral daily  valsartan 40 milliGRAM(s) Oral daily    MEDICATIONS  (PRN):  dextrose Oral Gel 15 Gram(s) Oral once PRN Blood Glucose LESS THAN 70 milliGRAM(s)/deciliter      Vital Signs Last 24 Hrs  T(C): 36.7 (16 Sep 2024 05:28), Max: 36.8 (15 Sep 2024 20:17)  T(F): 98.1 (16 Sep 2024 05:28), Max: 98.3 (15 Sep 2024 20:17)  HR: 82 (16 Sep 2024 05:28) (76 - 85)  BP: 147/68 (16 Sep 2024 05:28) (140/65 - 151/64)  BP(mean): --  RR: 18 (16 Sep 2024 05:28) (18 - 18)  SpO2: 96% (16 Sep 2024 05:28) (96% - 97%)    Parameters below as of 16 Sep 2024 05:28  Patient On (Oxygen Delivery Method): room air      CAPILLARY BLOOD GLUCOSE      POCT Blood Glucose.: 315 mg/dL (15 Sep 2024 21:10)  POCT Blood Glucose.: 429 mg/dL (15 Sep 2024 16:48)  POCT Blood Glucose.: 340 mg/dL (15 Sep 2024 11:50)  POCT Blood Glucose.: 304 mg/dL (15 Sep 2024 07:36)    I&O's Summary    14 Sep 2024 07:01  -  15 Sep 2024 07:00  --------------------------------------------------------  IN: 534 mL / OUT: 0 mL / NET: 534 mL    15 Sep 2024 07:01  -  16 Sep 2024 06:03  --------------------------------------------------------  IN: 240 mL / OUT: 0 mL / NET: 240 mL          Appearance: Normal	  HEENT:   Normal oral mucosa, PERRL, EOMI	  Lymphatic: No lymphadenopathy  Cardiovascular: Normal S1 S2, No JVD  Respiratory: Lungs clear to auscultation	  Gastrointestinal:  Soft, Non-tender, + BS	  Skin: No rash, No ecchymoses	  Extremities:     LABS:                        9.5    7.36  )-----------( 190      ( 15 Sep 2024 06:54 )             30.3     09-15    139  |  96  |  69<H>  ----------------------------<  289<H>  4.2   |  20<L>  |  9.90<H>    Ca    7.8<L>      15 Sep 2024 06:54  Phos  7.2     09-15  Mg     2.3     09-15            Urinalysis Basic - ( 15 Sep 2024 06:54 )    Color: x / Appearance: x / SG: x / pH: x  Gluc: 289 mg/dL / Ketone: x  / Bili: x / Urobili: x   Blood: x / Protein: x / Nitrite: x   Leuk Esterase: x / RBC: x / WBC x   Sq Epi: x / Non Sq Epi: x / Bacteria: x                      Consultant(s) Notes Reviewed:      Care Discussed with Consultants/Other Providers:    
  date of service: 09-17-24 @ 06:23  [facility]  REVIEW OF SYSTEMS:  CONSTITUTIONAL: No fever,  no  weight loss  ENT:  No  tinnitus,   no   vertigo  NECK: No pain or stiffness  RESPIRATORY: No cough, wheezing, chills or hemoptysis;    No Shortness of Breath  CARDIOVASCULAR: No chest pain, palpitations, dizziness  GASTROINTESTINAL: No abdominal or epigastric pain. No nausea, vomiting, or hematemesis; No diarrhea  No melena or hematochezia.  GENITOURINARY: No dysuria, frequency, hematuria, or incontinence  NEUROLOGICAL: No headaches  SKIN: No itching,  no   rash  LYMPH Nodes: No enlarged glands  ENDOCRINE: No heat or cold intolerance  MUSCULOSKELETAL: No joint pain or swelling  PSYCHIATRIC: No depression, anxiety  HEME/LYMPH: No easy bruising, or bleeding gums  ALLERGY AND IMMUNOLOGIC: No hives or eczema	    MEDICATIONS  (STANDING):  allopurinol 100 milliGRAM(s) Oral daily  aspirin enteric coated 81 milliGRAM(s) Oral daily  atorvastatin 80 milliGRAM(s) Oral at bedtime  calcium acetate 667 milliGRAM(s) Oral three times a day with meals  carvedilol 6.25 milliGRAM(s) Oral every 12 hours  clopidogrel Tablet 75 milliGRAM(s) Oral daily  dextrose 5%. 1000 milliLiter(s) (100 mL/Hr) IV Continuous <Continuous>  dextrose 5%. 1000 milliLiter(s) (50 mL/Hr) IV Continuous <Continuous>  dextrose 50% Injectable 25 Gram(s) IV Push once  dextrose 50% Injectable 12.5 Gram(s) IV Push once  dextrose 50% Injectable 25 Gram(s) IV Push once  epoetin wayne-epbx (RETACRIT) Injectable 73128 Unit(s) IV Push once  folic acid 1 milliGRAM(s) Oral daily  gabapentin 100 milliGRAM(s) Oral daily  glucagon  Injectable 1 milliGRAM(s) IntraMuscular once  heparin   Injectable 5000 Unit(s) SubCutaneous every 8 hours  insulin glargine Injectable (LANTUS) 24 Unit(s) SubCutaneous at bedtime  insulin lispro (ADMELOG) corrective regimen sliding scale   SubCutaneous three times a day before meals  insulin lispro (ADMELOG) corrective regimen sliding scale   SubCutaneous at bedtime  insulin lispro Injectable (ADMELOG) 6 Unit(s) SubCutaneous three times a day before meals  pantoprazole    Tablet 40 milliGRAM(s) Oral before breakfast  senna 2 Tablet(s) Oral at bedtime  sertraline 25 milliGRAM(s) Oral daily  valsartan 40 milliGRAM(s) Oral daily    MEDICATIONS  (PRN):  dextrose Oral Gel 15 Gram(s) Oral once PRN Blood Glucose LESS THAN 70 milliGRAM(s)/deciliter      Vital Signs Last 24 Hrs  T(C): 36.8 (17 Sep 2024 05:56), Max: 36.8 (16 Sep 2024 10:57)  T(F): 98.3 (17 Sep 2024 05:56), Max: 98.3 (17 Sep 2024 05:56)  HR: 80 (17 Sep 2024 05:56) (72 - 80)  BP: 137/62 (17 Sep 2024 05:56) (135/75 - 152/82)  BP(mean): --  RR: 18 (17 Sep 2024 05:56) (16 - 18)  SpO2: 93% (17 Sep 2024 05:56) (93% - 99%)    Parameters below as of 17 Sep 2024 05:56  Patient On (Oxygen Delivery Method): room air      CAPILLARY BLOOD GLUCOSE      POCT Blood Glucose.: 212 mg/dL (16 Sep 2024 22:08)  POCT Blood Glucose.: 186 mg/dL (16 Sep 2024 16:18)  POCT Blood Glucose.: 120 mg/dL (16 Sep 2024 13:10)  POCT Blood Glucose.: 132 mg/dL (16 Sep 2024 11:46)  POCT Blood Glucose.: 60 mg/dL (16 Sep 2024 11:19)  POCT Blood Glucose.: 66 mg/dL (16 Sep 2024 11:16)  POCT Blood Glucose.: 332 mg/dL (16 Sep 2024 07:49)    I&O's Summary    15 Sep 2024 07:01  -  16 Sep 2024 07:00  --------------------------------------------------------  IN: 240 mL / OUT: 0 mL / NET: 240 mL    16 Sep 2024 07:01  -  17 Sep 2024 06:23  --------------------------------------------------------  IN: 800 mL / OUT: 3600 mL / NET: -2800 mL          Appearance: Normal	  HEENT:   Normal oral mucosa, PERRL, EOMI	  Lymphatic: No lymphadenopathy  Cardiovascular: Normal S1 S2, No JVD  Respiratory: Lungs clear to auscultation	  Gastrointestinal:  Soft, Non-tender, + BS	  Skin: No rash, No ecchymoses	  Extremities:     LABS:                        9.5    7.36  )-----------( 190      ( 15 Sep 2024 06:54 )             30.3     09-15    139  |  96  |  69<H>  ----------------------------<  289<H>  4.2   |  20<L>  |  9.90<H>    Ca    7.8<L>      15 Sep 2024 06:54  Phos  7.2     09-15  Mg     2.3     09-15            Urinalysis Basic - ( 15 Sep 2024 06:54 )    Color: x / Appearance: x / SG: x / pH: x  Gluc: 289 mg/dL / Ketone: x  / Bili: x / Urobili: x   Blood: x / Protein: x / Nitrite: x   Leuk Esterase: x / RBC: x / WBC x   Sq Epi: x / Non Sq Epi: x / Bacteria: x                      Consultant(s) Notes Reviewed:      Care Discussed with Consultants/Other Providers:    
Date of Service: 09-16-24 @ 09:18           CARDIOLOGY     PROGRESS  NOTE   ________________________________________________    CHIEF COMPLAINT:Patient is a 64y old  Male who presents with a chief complaint of sob (16 Sep 2024 06:03)  no complain  	  REVIEW OF SYSTEMS:  CONSTITUTIONAL: No fever, weight loss, or fatigue  EYES: No eye pain, visual disturbances, or discharge  ENT:  No difficulty hearing, tinnitus, vertigo; No sinus or throat pain  NECK: No pain or stiffness  RESPIRATORY: No cough, wheezing, chills or hemoptysis; decrease Shortness of Breath  CARDIOVASCULAR: No chest pain, palpitations, passing out, dizziness, or leg swelling  GASTROINTESTINAL: No abdominal or epigastric pain. No nausea, vomiting, or hematemesis; No diarrhea or constipation. No melena or hematochezia.  GENITOURINARY: No dysuria, frequency, hematuria, or incontinence  NEUROLOGICAL: No headaches, memory loss, loss of strength, numbness, or tremors  SKIN: No itching, burning, rashes, or lesions   LYMPH Nodes: No enlarged glands  ENDOCRINE: No heat or cold intolerance; No hair loss  MUSCULOSKELETAL: No joint pain or swelling; No muscle, back, or extremity pain  PSYCHIATRIC: No depression, anxiety, mood swings, or difficulty sleeping  HEME/LYMPH: No easy bruising, or bleeding gums  ALLERGY AND IMMUNOLOGIC: No hives or eczema	    [x ] All others negative	  [ ] Unable to obtain    PHYSICAL EXAM:  T(C): 36.7 (09-16-24 @ 05:28), Max: 36.8 (09-15-24 @ 20:17)  HR: 82 (09-16-24 @ 05:28) (76 - 85)  BP: 147/68 (09-16-24 @ 05:28) (140/65 - 151/64)  RR: 18 (09-16-24 @ 05:28) (18 - 18)  SpO2: 96% (09-16-24 @ 05:28) (96% - 97%)  Wt(kg): --  I&O's Summary    15 Sep 2024 07:01  -  16 Sep 2024 07:00  --------------------------------------------------------  IN: 240 mL / OUT: 0 mL / NET: 240 mL        Appearance: Normal	  HEENT:   Normal oral mucosa, PERRL, EOMI	  Lymphatic: No lymphadenopathy  Cardiovascular: Normal S1 S2, No JVD, + murmurs, No edema  Respiratory: rhonchi  Psychiatry: A & O x 3, Mood & affect appropriate  Gastrointestinal:  Soft, Non-tender, + BS	  Skin: No rashes, No ecchymoses, No cyanosis	  Neurologic: Non-focal  Extremities: Normal range of motion, No clubbing, cyanosis or edema  Vascular: Peripheral pulses palpable 2+ bilaterally    MEDICATIONS  (STANDING):  allopurinol 100 milliGRAM(s) Oral daily  aspirin enteric coated 81 milliGRAM(s) Oral daily  atorvastatin 80 milliGRAM(s) Oral at bedtime  calcium acetate 667 milliGRAM(s) Oral three times a day with meals  carvedilol 6.25 milliGRAM(s) Oral every 12 hours  clopidogrel Tablet 75 milliGRAM(s) Oral daily  dextrose 5%. 1000 milliLiter(s) (100 mL/Hr) IV Continuous <Continuous>  dextrose 5%. 1000 milliLiter(s) (50 mL/Hr) IV Continuous <Continuous>  dextrose 50% Injectable 25 Gram(s) IV Push once  dextrose 50% Injectable 12.5 Gram(s) IV Push once  dextrose 50% Injectable 25 Gram(s) IV Push once  epoetin wayne-epbx (RETACRIT) Injectable 42431 Unit(s) IV Push once  epoetin wayne-epbx (RETACRIT) Injectable 83272 Unit(s) IV Push once  folic acid 1 milliGRAM(s) Oral daily  gabapentin 100 milliGRAM(s) Oral daily  glucagon  Injectable 1 milliGRAM(s) IntraMuscular once  heparin   Injectable 5000 Unit(s) SubCutaneous every 8 hours  insulin glargine Injectable (LANTUS) 24 Unit(s) SubCutaneous at bedtime  insulin lispro (ADMELOG) corrective regimen sliding scale   SubCutaneous three times a day before meals  insulin lispro (ADMELOG) corrective regimen sliding scale   SubCutaneous at bedtime  insulin lispro Injectable (ADMELOG) 12 Unit(s) SubCutaneous three times a day before meals  pantoprazole    Tablet 40 milliGRAM(s) Oral before breakfast  remdesivir  IVPB 100 milliGRAM(s) IV Intermittent every 24 hours  remdesivir  IVPB   IV Intermittent   senna 2 Tablet(s) Oral at bedtime  sertraline 25 milliGRAM(s) Oral daily  valsartan 40 milliGRAM(s) Oral daily      TELEMETRY: 	    ECG:  	  RADIOLOGY:  OTHER: 	  	  LABS:	 	    CARDIAC MARKERS:                          9.5    7.36  )-----------( 190      ( 15 Sep 2024 06:54 )             30.3     09-15    139  |  96  |  69<H>  ----------------------------<  289<H>  4.2   |  20<L>  |  9.90<H>    Ca    7.8<L>      15 Sep 2024 06:54  Phos  7.2     09-15  Mg     2.3     09-15      proBNP:   Lipid Profile:   HgA1c:   TSH:         Assessment and plan  ---------------------------  64-year male past medical history ESRD MWF, CAD s/p stents, HTN, HLD, DM presents to ED for fatigue and muscle weakness x 5 days.  Patient COVID-positive at  advised to come to ED for IV remdesivir.  Completed dialysis yesterday.  Denies use of Tylenol for symptomatic relief.  Denies nausea, vomiting, chest pain, SOB, abdominal pain, urinary symptoms, change in bowel movement.  No recent travel or sick contacts  pt with sig cardiac and medical history with fatigue, sob nad generalized weakness pt  COVID +   start RDV  add steroid if hypoxemia  will consider possible ID eval  chest x ray noted will check ct chest no contrast  ASHD ischemic cardiomyopathy, continue all cardiac meds  r/o chf, fluid overload repeat echo  ESRD on HD, renal eval  continue all cardiac meds  DM fs with coverage  dvt prophylaxis  increase bp will increase valsartan if bp remains elevated  s/p HD  TTE ordered  continue RDV/ cardiac meds  oob to the chair   doing well  not hypoxemic  continue current meds, doing better  awaiting echo    	        
Date of Service: 09-18-24 @ 09:36           CARDIOLOGY     PROGRESS  NOTE   ________________________________________________    CHIEF COMPLAINT:Patient is a 64y old  Male who presents with a chief complaint of sob (18 Sep 2024 08:43)  no complain  	  REVIEW OF SYSTEMS:  CONSTITUTIONAL: No fever, weight loss, or fatigue  EYES: No eye pain, visual disturbances, or discharge  ENT:  No difficulty hearing, tinnitus, vertigo; No sinus or throat pain  NECK: No pain or stiffness  RESPIRATORY: No cough, wheezing, chills or hemoptysis; No Shortness of Breath  CARDIOVASCULAR: No chest pain, palpitations, passing out, dizziness, or leg swelling  GASTROINTESTINAL: No abdominal or epigastric pain. No nausea, vomiting, or hematemesis; No diarrhea or constipation. No melena or hematochezia.  GENITOURINARY: No dysuria, frequency, hematuria, or incontinence  NEUROLOGICAL: No headaches, memory loss, loss of strength, numbness, or tremors  SKIN: No itching, burning, rashes, or lesions   LYMPH Nodes: No enlarged glands  ENDOCRINE: No heat or cold intolerance; No hair loss  MUSCULOSKELETAL: No joint pain or swelling; No muscle, back, or extremity pain  PSYCHIATRIC: No depression, anxiety, mood swings, or difficulty sleeping  HEME/LYMPH: No easy bruising, or bleeding gums  ALLERGY AND IMMUNOLOGIC: No hives or eczema	    [x ] All others negative	  [ ] Unable to obtain    PHYSICAL EXAM:  T(C): 36.8 (09-18-24 @ 04:42), Max: 36.9 (09-17-24 @ 20:05)  HR: 72 (09-18-24 @ 04:42) (71 - 79)  BP: 144/76 (09-18-24 @ 04:42) (133/60 - 144/76)  RR: 18 (09-18-24 @ 04:42) (18 - 18)  SpO2: 95% (09-18-24 @ 04:42) (95% - 99%)  Wt(kg): --  I&O's Summary      Appearance: Normal	  HEENT:   Normal oral mucosa, PERRL, EOMI	  Lymphatic: No lymphadenopathy  Cardiovascular: Normal S1 S2, No JVD, + murmurs, No edema  Respiratory:rhonchi  Gastrointestinal:  Soft, Non-tender, + BS	  Skin: No rashes, No ecchymoses, No cyanosis	  Extremities: Normal range of motion, No clubbing, cyanosis or edema  Vascular: Peripheral pulses palpable 2+ bilaterally    MEDICATIONS  (STANDING):  allopurinol 100 milliGRAM(s) Oral daily  aspirin enteric coated 81 milliGRAM(s) Oral daily  atorvastatin 80 milliGRAM(s) Oral at bedtime  calcium acetate 667 milliGRAM(s) Oral three times a day with meals  carvedilol 6.25 milliGRAM(s) Oral every 12 hours  clopidogrel Tablet 75 milliGRAM(s) Oral daily  dextrose 5%. 1000 milliLiter(s) (50 mL/Hr) IV Continuous <Continuous>  dextrose 5%. 1000 milliLiter(s) (100 mL/Hr) IV Continuous <Continuous>  dextrose 50% Injectable 25 Gram(s) IV Push once  dextrose 50% Injectable 12.5 Gram(s) IV Push once  dextrose 50% Injectable 25 Gram(s) IV Push once  epoetin wayne-epbx (RETACRIT) Injectable 22867 Unit(s) IV Push once  folic acid 1 milliGRAM(s) Oral daily  gabapentin 100 milliGRAM(s) Oral daily  glucagon  Injectable 1 milliGRAM(s) IntraMuscular once  heparin   Injectable 5000 Unit(s) SubCutaneous every 8 hours  insulin glargine Injectable (LANTUS) 21 Unit(s) SubCutaneous at bedtime  insulin lispro (ADMELOG) corrective regimen sliding scale   SubCutaneous three times a day before meals  insulin lispro (ADMELOG) corrective regimen sliding scale   SubCutaneous at bedtime  insulin lispro Injectable (ADMELOG) 8 Unit(s) SubCutaneous three times a day before meals  pantoprazole    Tablet 40 milliGRAM(s) Oral before breakfast  senna 2 Tablet(s) Oral at bedtime  sertraline 25 milliGRAM(s) Oral daily  valsartan 40 milliGRAM(s) Oral daily      TELEMETRY: 	    ECG:  	  RADIOLOGY:  OTHER: 	  	  LABS:	 	    CARDIAC MARKERS:                  proBNP:   Lipid Profile:   HgA1c:   TSH:         Assessment and plan  ---------------------------  64-year male past medical history ESRD MWF, CAD s/p stents, HTN, HLD, DM presents to ED for fatigue and muscle weakness x 5 days.  Patient COVID-positive at  advised to come to ED for IV remdesivir.  Completed dialysis yesterday.  Denies use of Tylenol for symptomatic relief.  Denies nausea, vomiting, chest pain, SOB, abdominal pain, urinary symptoms, change in bowel movement.  No recent travel or sick contacts  pt with sig cardiac and medical history with fatigue, sob nad generalized weakness pt  COVID +   start RDV  add steroid if hypoxemia  will consider possible ID eval  chest x ray noted will check ct chest no contrast  ASHD ischemic cardiomyopathy, continue all cardiac meds  r/o chf, fluid overload repeat echo  ESRD on HD, renal eval  continue all cardiac meds  DM fs with coverage  dvt prophylaxis  increase bp will increase valsartan if bp remains elevated  s/p HD  TTE ordered  continue RDV/ cardiac meds  oob to the chair   doing well  not hypoxemic  continue current meds, doing better  dc planning, awaiting discussed with ACP    	        
Seen earlier today     Chief Complaint: Diabetes Mellitus follow up    INTERVAL HX: Tolerating POs, eats well. HD was in progress at the time of visit. Discharge regimen discussed with patient and wife as well on the phone as requested by pt. pt agrees with discharge plan basal and bolus and trulicity o.75 mg weekly. Wife reports that he has been on Trulicity 0.75 mg weekly before stopping the med and still has Trulicity 0.75 mg dose and all DM supplies home. Last 24 hour BGs 134-200s, mostly above goal       Review of Systems:  General: As above  GI: No nausea, vomiting  Endocrine: no  S&Sx of hypoglycemia    Allergies    No Known Allergies    Intolerances      MEDICATIONS  (STANDING):  allopurinol 100 milliGRAM(s) Oral daily  aspirin enteric coated 81 milliGRAM(s) Oral daily  atorvastatin 80 milliGRAM(s) Oral at bedtime  calcium acetate 667 milliGRAM(s) Oral three times a day with meals  carvedilol 6.25 milliGRAM(s) Oral every 12 hours  clopidogrel Tablet 75 milliGRAM(s) Oral daily  dextrose 5%. 1000 milliLiter(s) (50 mL/Hr) IV Continuous <Continuous>  dextrose 5%. 1000 milliLiter(s) (100 mL/Hr) IV Continuous <Continuous>  dextrose 50% Injectable 25 Gram(s) IV Push once  dextrose 50% Injectable 12.5 Gram(s) IV Push once  dextrose 50% Injectable 25 Gram(s) IV Push once  folic acid 1 milliGRAM(s) Oral daily  gabapentin 100 milliGRAM(s) Oral daily  glucagon  Injectable 1 milliGRAM(s) IntraMuscular once  heparin   Injectable 5000 Unit(s) SubCutaneous every 8 hours  insulin glargine Injectable (LANTUS) 21 Unit(s) SubCutaneous at bedtime  insulin lispro (ADMELOG) corrective regimen sliding scale   SubCutaneous three times a day before meals  insulin lispro (ADMELOG) corrective regimen sliding scale   SubCutaneous at bedtime  insulin lispro Injectable (ADMELOG) 8 Unit(s) SubCutaneous three times a day before meals  pantoprazole    Tablet 40 milliGRAM(s) Oral before breakfast  senna 2 Tablet(s) Oral at bedtime  sertraline 25 milliGRAM(s) Oral daily  valsartan 40 milliGRAM(s) Oral daily      allopurinol   100 milliGRAM(s) Oral (09-18-24 @ 12:12)    atorvastatin   80 milliGRAM(s) Oral (09-17-24 @ 21:43)    insulin glargine Injectable (LANTUS)   21 Unit(s) SubCutaneous (09-17-24 @ 21:45)    insulin lispro (ADMELOG) corrective regimen sliding scale   2 Unit(s) SubCutaneous (09-18-24 @ 08:28)   2 Unit(s) SubCutaneous (09-17-24 @ 17:27)    insulin lispro (ADMELOG) corrective regimen sliding scale   1 Unit(s) SubCutaneous (09-17-24 @ 21:44)    insulin lispro Injectable (ADMELOG)   8 Unit(s) SubCutaneous (09-18-24 @ 12:11)   8 Unit(s) SubCutaneous (09-18-24 @ 08:28)   8 Unit(s) SubCutaneous (09-17-24 @ 17:27)        PHYSICAL EXAM:  VITALS: T(C): 36.8 (09-18-24 @ 15:24)  T(F): 98.2 (09-18-24 @ 15:24), Max: 98.4 (09-17-24 @ 20:05)  HR: 71 (09-18-24 @ 15:24) (63 - 79)  BP: 120/70 (09-18-24 @ 15:24) (103/43 - 144/76)  RR:  (17 - 18)  SpO2:  (95% - 99%)  Wt(kg): --  GENERAL: Male laying in bed, in NAD, HD in progress  Respiratory: Respirations unlabored   Extremities: Warm, no edema  NEURO: Alert , appropriate     LABS:  POCT Blood Glucose.: 192 mg/dL (09-18-24 @ 16:44)  POCT Blood Glucose.: 134 mg/dL (09-18-24 @ 11:20)  POCT Blood Glucose.: 208 mg/dL (09-18-24 @ 07:46)  POCT Blood Glucose.: 279 mg/dL (09-17-24 @ 21:28)  POCT Blood Glucose.: 248 mg/dL (09-17-24 @ 16:33)  POCT Blood Glucose.: 144 mg/dL (09-17-24 @ 12:10)  POCT Blood Glucose.: 116 mg/dL (09-17-24 @ 08:50)  POCT Blood Glucose.: 212 mg/dL (09-16-24 @ 22:08)  POCT Blood Glucose.: 186 mg/dL (09-16-24 @ 16:18)  POCT Blood Glucose.: 120 mg/dL (09-16-24 @ 13:10)  POCT Blood Glucose.: 132 mg/dL (09-16-24 @ 11:46)  POCT Blood Glucose.: 60 mg/dL (09-16-24 @ 11:19)  POCT Blood Glucose.: 66 mg/dL (09-16-24 @ 11:16)  POCT Blood Glucose.: 332 mg/dL (09-16-24 @ 07:49)  POCT Blood Glucose.: 315 mg/dL (09-15-24 @ 21:10)                          8.8    7.30  )-----------( 208      ( 18 Sep 2024 10:42 )             26.2     09-18    135  |  94[L]  |  86[H]  ----------------------------<  255[H]  3.8   |  22  |  11.12[H]    Ca    7.6[L]      18 Sep 2024 10:42      eGFR: 5 mL/min/1.73m2 (18 Sep 2024 10:42)      Thyroid Function Tests:      A1C with Estimated Average Glucose Result: 10.2 % (09-13-24 @ 07:17)    Estimated Average Glucose: 246 mg/dL (09-13-24 @ 07:17)        Diet, Consistent Carbohydrate w/Evening Snack (09-12-24 @ 17:44) [Active]            
  date of service: 09-18-24 @ 08:43  [facility]  REVIEW OF SYSTEMS:  CONSTITUTIONAL: No fever,  no  weight loss  ENT:  No  tinnitus,   no   vertigo  NECK: No pain or stiffness  RESPIRATORY: No cough, wheezing, chills or hemoptysis;    No Shortness of Breath  CARDIOVASCULAR: No chest pain, palpitations, dizziness  GASTROINTESTINAL: No abdominal or epigastric pain. No nausea, vomiting, or hematemesis; No diarrhea  No melena or hematochezia.  GENITOURINARY: No dysuria, frequency, hematuria, or incontinence  NEUROLOGICAL: No headaches  SKIN: No itching,  no   rash  LYMPH Nodes: No enlarged glands  ENDOCRINE: No heat or cold intolerance  MUSCULOSKELETAL: No joint pain or swelling  PSYCHIATRIC: No depression, anxiety  HEME/LYMPH: No easy bruising, or bleeding gums  ALLERGY AND IMMUNOLOGIC: No hives or eczema	    MEDICATIONS  (STANDING):  allopurinol 100 milliGRAM(s) Oral daily  aspirin enteric coated 81 milliGRAM(s) Oral daily  atorvastatin 80 milliGRAM(s) Oral at bedtime  calcium acetate 667 milliGRAM(s) Oral three times a day with meals  carvedilol 6.25 milliGRAM(s) Oral every 12 hours  clopidogrel Tablet 75 milliGRAM(s) Oral daily  dextrose 5%. 1000 milliLiter(s) (50 mL/Hr) IV Continuous <Continuous>  dextrose 5%. 1000 milliLiter(s) (100 mL/Hr) IV Continuous <Continuous>  dextrose 50% Injectable 25 Gram(s) IV Push once  dextrose 50% Injectable 25 Gram(s) IV Push once  dextrose 50% Injectable 12.5 Gram(s) IV Push once  epoetin wayne-epbx (RETACRIT) Injectable 57938 Unit(s) IV Push once  folic acid 1 milliGRAM(s) Oral daily  gabapentin 100 milliGRAM(s) Oral daily  glucagon  Injectable 1 milliGRAM(s) IntraMuscular once  heparin   Injectable 5000 Unit(s) SubCutaneous every 8 hours  insulin glargine Injectable (LANTUS) 21 Unit(s) SubCutaneous at bedtime  insulin lispro (ADMELOG) corrective regimen sliding scale   SubCutaneous three times a day before meals  insulin lispro (ADMELOG) corrective regimen sliding scale   SubCutaneous at bedtime  insulin lispro Injectable (ADMELOG) 8 Unit(s) SubCutaneous three times a day before meals  pantoprazole    Tablet 40 milliGRAM(s) Oral before breakfast  senna 2 Tablet(s) Oral at bedtime  sertraline 25 milliGRAM(s) Oral daily  valsartan 40 milliGRAM(s) Oral daily    MEDICATIONS  (PRN):  dextrose Oral Gel 15 Gram(s) Oral once PRN Blood Glucose LESS THAN 70 milliGRAM(s)/deciliter      Vital Signs Last 24 Hrs  T(C): 36.8 (18 Sep 2024 04:42), Max: 36.9 (17 Sep 2024 20:05)  T(F): 98.2 (18 Sep 2024 04:42), Max: 98.4 (17 Sep 2024 20:05)  HR: 72 (18 Sep 2024 04:42) (71 - 79)  BP: 144/76 (18 Sep 2024 04:42) (133/60 - 144/76)  BP(mean): --  RR: 18 (18 Sep 2024 04:42) (18 - 18)  SpO2: 95% (18 Sep 2024 04:42) (95% - 99%)    Parameters below as of 18 Sep 2024 04:42  Patient On (Oxygen Delivery Method): room air      CAPILLARY BLOOD GLUCOSE      POCT Blood Glucose.: 208 mg/dL (18 Sep 2024 07:46)  POCT Blood Glucose.: 279 mg/dL (17 Sep 2024 21:28)  POCT Blood Glucose.: 248 mg/dL (17 Sep 2024 16:33)  POCT Blood Glucose.: 144 mg/dL (17 Sep 2024 12:10)  POCT Blood Glucose.: 116 mg/dL (17 Sep 2024 08:50)    I&O's Summary        Appearance: Normal	  HEENT:   Normal oral mucosa, PERRL, EOMI	  Lymphatic: No lymphadenopathy  Cardiovascular: Normal S1 S2, No JVD  Respiratory: Lungs clear to auscultation	  Gastrointestinal:  Soft, Non-tender, + BS	  Skin: No rash, No ecchymoses	  Extremities:     LABS:                                  Consultant(s) Notes Reviewed:      Care Discussed with Consultants/Other Providers:    
Date of Service: 09-13-24 @ 09:55           CARDIOLOGY     PROGRESS  NOTE   ________________________________________________    CHIEF COMPLAINT:Patient is a 64y old  Male who presents with a chief complaint of sob (13 Sep 2024 08:19)  comfortable no complain  	  REVIEW OF SYSTEMS:  CONSTITUTIONAL: No fever, weight loss, or fatigue  EYES: No eye pain, visual disturbances, or discharge  ENT:  No difficulty hearing, tinnitus, vertigo; No sinus or throat pain  NECK: No pain or stiffness  RESPIRATORY: No cough, wheezing, chills or hemoptysis; + Shortness of Breath  CARDIOVASCULAR: No chest pain, palpitations, passing out, dizziness, or leg swelling  GASTROINTESTINAL: No abdominal or epigastric pain. No nausea, vomiting, or hematemesis; No diarrhea or constipation. No melena or hematochezia.  GENITOURINARY: No dysuria, frequency, hematuria, or incontinence  NEUROLOGICAL: No headaches, memory loss, loss of strength, numbness, or tremors  SKIN: No itching, burning, rashes, or lesions   LYMPH Nodes: No enlarged glands  ENDOCRINE: No heat or cold intolerance; No hair loss  MUSCULOSKELETAL: No joint pain or swelling; No muscle, back, or extremity pain  PSYCHIATRIC: No depression, anxiety, mood swings, or difficulty sleeping  HEME/LYMPH: No easy bruising, or bleeding gums  ALLERGY AND IMMUNOLOGIC: No hives or eczema	    [x ] All others negative	  [ ] Unable to obtain    PHYSICAL EXAM:  T(C): 36.6 (09-13-24 @ 04:35), Max: 38.8 (09-12-24 @ 14:00)  HR: 97 (09-13-24 @ 04:35) (88 - 108)  BP: 152/81 (09-13-24 @ 04:35) (149/68 - 177/93)  RR: 18 (09-13-24 @ 04:35) (16 - 20)  SpO2: 97% (09-13-24 @ 04:35) (94% - 98%)  Wt(kg): --  I&O's Summary      Appearance: Normal	  HEENT:   Normal oral mucosa, PERRL, EOMI	  Lymphatic: No lymphadenopathy  Cardiovascular: Normal S1 S2, No JVD, + murmurs, No edema  Respiratory: rhonchi  Psychiatry: A & O x 3, Mood & affect appropriate  Gastrointestinal:  Soft, Non-tender, + BS	  Skin: No rashes, No ecchymoses, No cyanosis	  Extremities: Normal range of motion, No clubbing, cyanosis or edema  Vascular: Peripheral pulses palpable 2+ bilaterally    MEDICATIONS  (STANDING):  allopurinol 100 milliGRAM(s) Oral daily  aspirin enteric coated 81 milliGRAM(s) Oral daily  atorvastatin 80 milliGRAM(s) Oral at bedtime  carvedilol 6.25 milliGRAM(s) Oral every 12 hours  clopidogrel Tablet 75 milliGRAM(s) Oral daily  dextrose 5%. 1000 milliLiter(s) (100 mL/Hr) IV Continuous <Continuous>  dextrose 5%. 1000 milliLiter(s) (50 mL/Hr) IV Continuous <Continuous>  dextrose 50% Injectable 25 Gram(s) IV Push once  dextrose 50% Injectable 12.5 Gram(s) IV Push once  dextrose 50% Injectable 25 Gram(s) IV Push once  epoetin wayne-epbx (RETACRIT) Injectable 73244 Unit(s) IV Push once  folic acid 1 milliGRAM(s) Oral daily  gabapentin 100 milliGRAM(s) Oral daily  glucagon  Injectable 1 milliGRAM(s) IntraMuscular once  heparin   Injectable 5000 Unit(s) SubCutaneous every 8 hours  insulin lispro (ADMELOG) corrective regimen sliding scale   SubCutaneous three times a day before meals  insulin lispro (ADMELOG) corrective regimen sliding scale   SubCutaneous at bedtime  pantoprazole    Tablet 40 milliGRAM(s) Oral before breakfast  remdesivir  IVPB   IV Intermittent   remdesivir  IVPB 100 milliGRAM(s) IV Intermittent every 24 hours  senna 2 Tablet(s) Oral at bedtime  sertraline 25 milliGRAM(s) Oral daily  valsartan 40 milliGRAM(s) Oral daily      TELEMETRY: 	    ECG:  	  RADIOLOGY:  OTHER: 	  	  LABS:	 	    CARDIAC MARKERS:                          10.0   10.87 )-----------( 178      ( 13 Sep 2024 07:17 )             31.3     09-13    138  |  97  |  61<H>  ----------------------------<  270<H>  3.5   |  21<L>  |  9.28<H>    Ca    8.2<L>      13 Sep 2024 07:15    TPro  6.7  /  Alb  3.6  /  TBili  0.4  /  DBili  x   /  AST  10  /  ALT  9<L>  /  AlkPhos  72  09-13    proBNP:   Lipid Profile:   HgA1c:   TSH:       < from: Xray Chest 1 View- PORTABLE-Urgent (Xray Chest 1 View- PORTABLE-Urgent .) (09.12.24 @ 14:13) >  Mild pulmonary venous congestion/perihilar interstitial edema, new.    Right perihilar/lower lobe interstitial infiltrates again evident.        Assessment and plan  ---------------------------  64-year male past medical history ESRD MWF, CAD s/p stents, HTN, HLD, DM presents to ED for fatigue and muscle weakness x 5 days.  Patient COVID-positive at  advised to come to ED for IV remdesivir.  Completed dialysis yesterday.  Denies use of Tylenol for symptomatic relief.  Denies nausea, vomiting, chest pain, SOB, abdominal pain, urinary symptoms, change in bowel movement.  No recent travel or sick contacts  pt with sig cardiac and medical history with fatigue, sob nad generalized weakness pt  COVID +   start RDV  add steroid if hypoxemia  will consider possible ID eval  chest x ray noted will check ct chest no contrast  ASHD ischemic cardiomyopathy, continue all cardiac meds  r/o chf, fluid overload repeat echo  ESRD on HD, renal eval  continue all cardiac meds  DM fs with coverage  dvt prophylaxis  increase bp will increase valsartan if bp remains elevated  renal appreciated, HD ?today sec to vascular congestion    	        
[facility] [phone]        Patient seen and examined in bed.  He was the same         MEDICATIONS  (STANDING):  allopurinol 100 milliGRAM(s) Oral daily  aspirin enteric coated 81 milliGRAM(s) Oral daily  atorvastatin 80 milliGRAM(s) Oral at bedtime  calcium acetate 667 milliGRAM(s) Oral three times a day with meals  carvedilol 6.25 milliGRAM(s) Oral every 12 hours  clopidogrel Tablet 75 milliGRAM(s) Oral daily  dextrose 5%. 1000 milliLiter(s) (50 mL/Hr) IV Continuous <Continuous>  dextrose 5%. 1000 milliLiter(s) (100 mL/Hr) IV Continuous <Continuous>  dextrose 50% Injectable 25 Gram(s) IV Push once  dextrose 50% Injectable 25 Gram(s) IV Push once  dextrose 50% Injectable 12.5 Gram(s) IV Push once  epoetin wayne-epbx (RETACRIT) Injectable 72562 Unit(s) IV Push once  epoetin wayne-epbx (RETACRIT) Injectable 85729 Unit(s) IV Push once  folic acid 1 milliGRAM(s) Oral daily  gabapentin 100 milliGRAM(s) Oral daily  glucagon  Injectable 1 milliGRAM(s) IntraMuscular once  heparin   Injectable 5000 Unit(s) SubCutaneous every 8 hours  insulin glargine Injectable (LANTUS) 24 Unit(s) SubCutaneous at bedtime  insulin lispro (ADMELOG) corrective regimen sliding scale   SubCutaneous at bedtime  insulin lispro (ADMELOG) corrective regimen sliding scale   SubCutaneous three times a day before meals  insulin lispro Injectable (ADMELOG) 12 Unit(s) SubCutaneous three times a day before meals  pantoprazole    Tablet 40 milliGRAM(s) Oral before breakfast  remdesivir  IVPB 100 milliGRAM(s) IV Intermittent every 24 hours  remdesivir  IVPB   IV Intermittent   senna 2 Tablet(s) Oral at bedtime  sertraline 25 milliGRAM(s) Oral daily  valsartan 40 milliGRAM(s) Oral daily      VITAL:  T(C): , Max: 36.8 (09-15-24 @ 20:17)  T(F): , Max: 98.3 (09-15-24 @ 20:17)  HR: 82 (09-16-24 @ 05:28)  BP: 147/68 (09-16-24 @ 05:28)  BP(mean): --  RR: 18 (09-16-24 @ 05:28)  SpO2: 96% (09-16-24 @ 05:28)  Wt(kg): --    I and O's:    09-15 @ 07:01  -  09-16 @ 07:00  --------------------------------------------------------  IN: 240 mL / OUT: 0 mL / NET: 240 mL          PHYSICAL EXAM:    Constitutional: NAD  Neck:  No JVD  Respiratory: CTAB/L  Cardiovascular: S1 and S2  Gastrointestinal: BS+, soft, NT/ND  Extremities: No peripheral edema  Neurological: A/O x 3, no focal deficits  Psychiatric: Normal mood, normal affect  : No Jay  Skin: No rashes  Access:avf    LABS:                        9.5    7.36  )-----------( 190      ( 15 Sep 2024 06:54 )             30.3     09-15    139  |  96  |  69<H>  ----------------------------<  289<H>  4.2   |  20<L>  |  9.90<H>    Ca    7.8<L>      15 Sep 2024 06:54  Phos  7.2     09-15  Mg     2.3     09-15            Urine Studies:  Urinalysis Basic - ( 15 Sep 2024 06:54 )    Color: x / Appearance: x / SG: x / pH: x  Gluc: 289 mg/dL / Ketone: x  / Bili: x / Urobili: x   Blood: x / Protein: x / Nitrite: x   Leuk Esterase: x / RBC: x / WBC x   Sq Epi: x / Non Sq Epi: x / Bacteria: x            RADIOLOGY & ADDITIONAL STUDIES:            
  date of service: 09-14-24 @ 08:39  afebrile  REVIEW OF SYSTEMS:  CONSTITUTIONAL: No fever,  no  weight loss  ENT:  No  tinnitus,   no   vertigo  NECK: No pain or stiffness  RESPIRATORY: No cough, wheezing, chills or hemoptysis;    No Shortness of Breath  CARDIOVASCULAR: No chest pain, palpitations, dizziness  GASTROINTESTINAL: No abdominal or epigastric pain. No nausea, vomiting, or hematemesis; No diarrhea  No melena or hematochezia.  GENITOURINARY: No dysuria, frequency, hematuria, or incontinence  NEUROLOGICAL: No headaches  SKIN: No itching,  no   rash  LYMPH Nodes: No enlarged glands  ENDOCRINE: No heat or cold intolerance  MUSCULOSKELETAL: No joint pain or swelling  PSYCHIATRIC: No depression, anxiety  HEME/LYMPH: No easy bruising, or bleeding gums  ALLERGY AND IMMUNOLOGIC: No hives or eczema	    MEDICATIONS  (STANDING):  allopurinol 100 milliGRAM(s) Oral daily  aspirin enteric coated 81 milliGRAM(s) Oral daily  atorvastatin 80 milliGRAM(s) Oral at bedtime  carvedilol 6.25 milliGRAM(s) Oral every 12 hours  clopidogrel Tablet 75 milliGRAM(s) Oral daily  dextrose 5%. 1000 milliLiter(s) (50 mL/Hr) IV Continuous <Continuous>  dextrose 5%. 1000 milliLiter(s) (100 mL/Hr) IV Continuous <Continuous>  dextrose 50% Injectable 25 Gram(s) IV Push once  dextrose 50% Injectable 25 Gram(s) IV Push once  dextrose 50% Injectable 12.5 Gram(s) IV Push once  epoetin wayne-epbx (RETACRIT) Injectable 48050 Unit(s) IV Push once  folic acid 1 milliGRAM(s) Oral daily  gabapentin 100 milliGRAM(s) Oral daily  glucagon  Injectable 1 milliGRAM(s) IntraMuscular once  heparin   Injectable 5000 Unit(s) SubCutaneous every 8 hours  insulin lispro (ADMELOG) corrective regimen sliding scale   SubCutaneous three times a day before meals  insulin lispro (ADMELOG) corrective regimen sliding scale   SubCutaneous at bedtime  pantoprazole    Tablet 40 milliGRAM(s) Oral before breakfast  remdesivir  IVPB   IV Intermittent   remdesivir  IVPB 100 milliGRAM(s) IV Intermittent every 24 hours  senna 2 Tablet(s) Oral at bedtime  sertraline 25 milliGRAM(s) Oral daily  valsartan 40 milliGRAM(s) Oral daily    MEDICATIONS  (PRN):  dextrose Oral Gel 15 Gram(s) Oral once PRN Blood Glucose LESS THAN 70 milliGRAM(s)/deciliter      Vital Signs Last 24 Hrs  T(C): 37.1 (14 Sep 2024 04:48), Max: 37.2 (13 Sep 2024 11:10)  T(F): 98.8 (14 Sep 2024 04:48), Max: 98.9 (13 Sep 2024 11:10)  HR: 80 (14 Sep 2024 04:48) (78 - 88)  BP: 148/79 (14 Sep 2024 04:48) (137/68 - 159/70)  BP(mean): --  RR: 18 (14 Sep 2024 04:48) (17 - 18)  SpO2: 94% (14 Sep 2024 04:48) (94% - 96%)    Parameters below as of 14 Sep 2024 04:48  Patient On (Oxygen Delivery Method): room air      CAPILLARY BLOOD GLUCOSE      POCT Blood Glucose.: 287 mg/dL (14 Sep 2024 07:43)  POCT Blood Glucose.: 342 mg/dL (13 Sep 2024 22:24)  POCT Blood Glucose.: 339 mg/dL (13 Sep 2024 16:40)  POCT Blood Glucose.: 278 mg/dL (13 Sep 2024 12:00)    I&O's Summary    13 Sep 2024 07:01  -  14 Sep 2024 07:00  --------------------------------------------------------  IN: 480 mL / OUT: 3250 mL / NET: -2770 mL          Appearance: Normal	  HEENT:   Normal oral mucosa, PERRL, EOMI	  Lymphatic: No lymphadenopathy  Cardiovascular: Normal S1 S2, No JVD  Respiratory: Lungs clear to auscultation	  Gastrointestinal:  Soft, Non-tender, + BS	  Skin: No rash, No ecchymoses	  Extremities:     LABS:                        10.0   10.87 )-----------( 178      ( 13 Sep 2024 07:17 )             31.3     09-13    138  |  97  |  61<H>  ----------------------------<  270<H>  3.5   |  21<L>  |  9.28<H>    Ca    8.2<L>      13 Sep 2024 07:15    TPro  6.7  /  Alb  3.6  /  TBili  0.4  /  DBili  x   /  AST  10  /  ALT  9<L>  /  AlkPhos  72  09-13          Urinalysis Basic - ( 13 Sep 2024 07:15 )    Color: x / Appearance: x / SG: x / pH: x  Gluc: 270 mg/dL / Ketone: x  / Bili: x / Urobili: x   Blood: x / Protein: x / Nitrite: x   Leuk Esterase: x / RBC: x / WBC x   Sq Epi: x / Non Sq Epi: x / Bacteria: x          09-12 @ 13:54  3.8  23              Consultant(s) Notes Reviewed:      Care Discussed with Consultants/Other Providers:    
  date of service: 09-15-24 @ 09:32  afberile  REVIEW OF SYSTEMS:  CONSTITUTIONAL: No fever,  no  weight loss  ENT:  No  tinnitus,   no   vertigo  NECK: No pain or stiffness  RESPIRATORY: No cough, wheezing, chills or hemoptysis;    No Shortness of Breath  CARDIOVASCULAR: No chest pain, palpitations, dizziness  GASTROINTESTINAL: No abdominal or epigastric pain. No nausea, vomiting, or hematemesis; No diarrhea  No melena or hematochezia.  GENITOURINARY: No dysuria, frequency, hematuria, or incontinence  NEUROLOGICAL: No headaches  SKIN: No itching,  no   rash  LYMPH Nodes: No enlarged glands  ENDOCRINE: No heat or cold intolerance  MUSCULOSKELETAL: No joint pain or swelling  PSYCHIATRIC: No depression, anxiety  HEME/LYMPH: No easy bruising, or bleeding gums  ALLERGY AND IMMUNOLOGIC: No hives or eczema	    MEDICATIONS  (STANDING):  allopurinol 100 milliGRAM(s) Oral daily  aspirin enteric coated 81 milliGRAM(s) Oral daily  atorvastatin 80 milliGRAM(s) Oral at bedtime  carvedilol 6.25 milliGRAM(s) Oral every 12 hours  clopidogrel Tablet 75 milliGRAM(s) Oral daily  dextrose 5%. 1000 milliLiter(s) (50 mL/Hr) IV Continuous <Continuous>  dextrose 5%. 1000 milliLiter(s) (100 mL/Hr) IV Continuous <Continuous>  dextrose 50% Injectable 25 Gram(s) IV Push once  dextrose 50% Injectable 25 Gram(s) IV Push once  dextrose 50% Injectable 12.5 Gram(s) IV Push once  epoetin wayne-epbx (RETACRIT) Injectable 04329 Unit(s) IV Push once  folic acid 1 milliGRAM(s) Oral daily  gabapentin 100 milliGRAM(s) Oral daily  glucagon  Injectable 1 milliGRAM(s) IntraMuscular once  heparin   Injectable 5000 Unit(s) SubCutaneous every 8 hours  insulin lispro (ADMELOG) corrective regimen sliding scale   SubCutaneous three times a day before meals  insulin lispro (ADMELOG) corrective regimen sliding scale   SubCutaneous at bedtime  pantoprazole    Tablet 40 milliGRAM(s) Oral before breakfast  remdesivir  IVPB 100 milliGRAM(s) IV Intermittent every 24 hours  remdesivir  IVPB   IV Intermittent   senna 2 Tablet(s) Oral at bedtime  sertraline 25 milliGRAM(s) Oral daily  valsartan 40 milliGRAM(s) Oral daily    MEDICATIONS  (PRN):  dextrose Oral Gel 15 Gram(s) Oral once PRN Blood Glucose LESS THAN 70 milliGRAM(s)/deciliter      Vital Signs Last 24 Hrs  T(C): 36.7 (15 Sep 2024 05:14), Max: 36.8 (14 Sep 2024 20:06)  T(F): 98.1 (15 Sep 2024 05:14), Max: 98.2 (14 Sep 2024 20:06)  HR: 75 (15 Sep 2024 05:14) (75 - 77)  BP: 157/73 (15 Sep 2024 05:14) (132/79 - 157/73)  BP(mean): --  RR: 18 (15 Sep 2024 05:14) (18 - 18)  SpO2: 97% (15 Sep 2024 05:14) (95% - 97%)    Parameters below as of 15 Sep 2024 05:14  Patient On (Oxygen Delivery Method): room air      CAPILLARY BLOOD GLUCOSE      POCT Blood Glucose.: 304 mg/dL (15 Sep 2024 07:36)  POCT Blood Glucose.: 332 mg/dL (14 Sep 2024 21:34)  POCT Blood Glucose.: 326 mg/dL (14 Sep 2024 16:44)  POCT Blood Glucose.: 328 mg/dL (14 Sep 2024 11:07)    I&O's Summary    14 Sep 2024 07:01  -  15 Sep 2024 07:00  --------------------------------------------------------  IN: 534 mL / OUT: 0 mL / NET: 534 mL          Appearance: Normal	  HEENT:   Normal oral mucosa, PERRL, EOMI	  Lymphatic: No lymphadenopathy  Cardiovascular: Normal S1 S2, No JVD  Respiratory: Lungs clear to auscultation	  Gastrointestinal:  Soft, Non-tender, + BS	  Skin: No rash, No ecchymoses	  Extremities:     LABS:                        9.5    7.36  )-----------( 190      ( 15 Sep 2024 06:54 )             30.3     09-15    139  |  96  |  69<H>  ----------------------------<  289<H>  4.2   |  20<L>  |  9.90<H>    Ca    7.8<L>      15 Sep 2024 06:54  Phos  7.2     09-15  Mg     2.3     09-15            Urinalysis Basic - ( 15 Sep 2024 06:54 )    Color: x / Appearance: x / SG: x / pH: x  Gluc: 289 mg/dL / Ketone: x  / Bili: x / Urobili: x   Blood: x / Protein: x / Nitrite: x   Leuk Esterase: x / RBC: x / WBC x   Sq Epi: x / Non Sq Epi: x / Bacteria: x                      Consultant(s) Notes Reviewed:      Care Discussed with Consultants/Other Providers:    
[facility] [phone]        Patient seen and examined, states cough is better. denies any SOB, on room air. he states he was taking phosphate binder stopped taking it due to the cost of the medication. still makes urine        MEDICATIONS  (STANDING):  allopurinol 100 milliGRAM(s) Oral daily  aspirin enteric coated 81 milliGRAM(s) Oral daily  atorvastatin 80 milliGRAM(s) Oral at bedtime  calcium acetate 667 milliGRAM(s) Oral three times a day with meals  carvedilol 6.25 milliGRAM(s) Oral every 12 hours  clopidogrel Tablet 75 milliGRAM(s) Oral daily  dextrose 5%. 1000 milliLiter(s) (100 mL/Hr) IV Continuous <Continuous>  dextrose 5%. 1000 milliLiter(s) (50 mL/Hr) IV Continuous <Continuous>  dextrose 50% Injectable 25 Gram(s) IV Push once  dextrose 50% Injectable 25 Gram(s) IV Push once  dextrose 50% Injectable 12.5 Gram(s) IV Push once  epoetin wayne-epbx (RETACRIT) Injectable 24780 Unit(s) IV Push once  folic acid 1 milliGRAM(s) Oral daily  gabapentin 100 milliGRAM(s) Oral daily  glucagon  Injectable 1 milliGRAM(s) IntraMuscular once  heparin   Injectable 5000 Unit(s) SubCutaneous every 8 hours  insulin lispro (ADMELOG) corrective regimen sliding scale   SubCutaneous at bedtime  insulin lispro (ADMELOG) corrective regimen sliding scale   SubCutaneous three times a day before meals  pantoprazole    Tablet 40 milliGRAM(s) Oral before breakfast  remdesivir  IVPB 100 milliGRAM(s) IV Intermittent every 24 hours  remdesivir  IVPB   IV Intermittent   senna 2 Tablet(s) Oral at bedtime  sertraline 25 milliGRAM(s) Oral daily  valsartan 40 milliGRAM(s) Oral daily      VITAL:  T(C): , Max: 36.8 (09-14-24 @ 20:06)  T(F): , Max: 98.2 (09-14-24 @ 20:06)  HR: 75 (09-15-24 @ 05:14)  BP: 157/73 (09-15-24 @ 05:14)  BP(mean): --  RR: 18 (09-15-24 @ 05:14)  SpO2: 97% (09-15-24 @ 05:14)  Wt(kg): --    I and O's:    09-14 @ 07:01  -  09-15 @ 07:00  --------------------------------------------------------  IN: 534 mL / OUT: 0 mL / NET: 534 mL          PHYSICAL EXAM:    Constitutional: NAD  Neck:  No JVD  Respiratory: Diminished bilaterally  Cardiovascular: S1 and S2  Gastrointestinal: BS+, soft, NT/ND, +distension  Extremities: No peripheral edema  Neurological: A/O x 3, no focal deficits  Psychiatric: Normal mood, normal affect  : No Jay  Skin: No rashes  Access: Left arm AV fistula +thrill +bruit    LABS:                        9.5    7.36  )-----------( 190      ( 15 Sep 2024 06:54 )             30.3     09-15    139  |  96  |  69<H>  ----------------------------<  289<H>  4.2   |  20<L>  |  9.90<H>    Ca    7.8<L>      15 Sep 2024 06:54  Phos  7.2     09-15  Mg     2.3     09-15            Urine Studies:  Urinalysis Basic - ( 15 Sep 2024 06:54 )    Color: x / Appearance: x / SG: x / pH: x  Gluc: 289 mg/dL / Ketone: x  / Bili: x / Urobili: x   Blood: x / Protein: x / Nitrite: x   Leuk Esterase: x / RBC: x / WBC x   Sq Epi: x / Non Sq Epi: x / Bacteria: x            RADIOLOGY & ADDITIONAL STUDIES:            
[facility] [phone]        Patient seen and examined in bed.  He was the same         MEDICATIONS  (STANDING):  allopurinol 100 milliGRAM(s) Oral daily  aspirin enteric coated 81 milliGRAM(s) Oral daily  atorvastatin 80 milliGRAM(s) Oral at bedtime  calcium acetate 667 milliGRAM(s) Oral three times a day with meals  carvedilol 6.25 milliGRAM(s) Oral every 12 hours  clopidogrel Tablet 75 milliGRAM(s) Oral daily  dextrose 5%. 1000 milliLiter(s) (50 mL/Hr) IV Continuous <Continuous>  dextrose 5%. 1000 milliLiter(s) (100 mL/Hr) IV Continuous <Continuous>  dextrose 50% Injectable 25 Gram(s) IV Push once  dextrose 50% Injectable 25 Gram(s) IV Push once  dextrose 50% Injectable 12.5 Gram(s) IV Push once  epoetin wayne-epbx (RETACRIT) Injectable 82883 Unit(s) IV Push once  folic acid 1 milliGRAM(s) Oral daily  gabapentin 100 milliGRAM(s) Oral daily  glucagon  Injectable 1 milliGRAM(s) IntraMuscular once  heparin   Injectable 5000 Unit(s) SubCutaneous every 8 hours  insulin glargine Injectable (LANTUS) 24 Unit(s) SubCutaneous at bedtime  insulin lispro (ADMELOG) corrective regimen sliding scale   SubCutaneous at bedtime  insulin lispro (ADMELOG) corrective regimen sliding scale   SubCutaneous three times a day before meals  insulin lispro Injectable (ADMELOG) 6 Unit(s) SubCutaneous three times a day before meals  pantoprazole    Tablet 40 milliGRAM(s) Oral before breakfast  senna 2 Tablet(s) Oral at bedtime  sertraline 25 milliGRAM(s) Oral daily  valsartan 40 milliGRAM(s) Oral daily      VITAL:  T(C): , Max: 36.8 (09-16-24 @ 10:57)  T(F): , Max: 98.3 (09-17-24 @ 05:56)  HR: 80 (09-17-24 @ 05:56)  BP: 137/62 (09-17-24 @ 05:56)  BP(mean): --  RR: 18 (09-17-24 @ 05:56)  SpO2: 93% (09-17-24 @ 05:56)  Wt(kg): --    I and O's:    09-16 @ 07:01 - 09-17 @ 07:00  --------------------------------------------------------  IN: 800 mL / OUT: 3600 mL / NET: -2800 mL          PHYSICAL EXAM:    Constitutional: NAD  Neck:  No JVD  Respiratory: CTAB/L  Cardiovascular: S1 and S2  Gastrointestinal: BS+, soft, NT/ND  Extremities: No peripheral edema  Neurological: A/O x 3, no focal deficits  Psychiatric: Normal mood, normal affect  : No Jay  Skin: No rashes  Access: avf    LABS:                Urine Studies:          RADIOLOGY & ADDITIONAL STUDIES:            
 Seen earlier today     Chief Complaint: Diabetes Mellitus follow up    INTERVAL HX: " Feel Okay". Tolerating POs with good appetite, but oral intake vary depending on foods and eats much less here than home. Noted hypoglycemia yesterday ( BG 66) at prelunch which was treated to 132 with 4 oz of fruit juice. Noted hyperglycemia at HS  ( 212) and fasting  at goal.       Review of Systems:  General: As above  GI: No nausea, vomiting  Endocrine: no  S&Sx of hypoglycemia    Allergies    No Known Allergies    Intolerances      MEDICATIONS  (STANDING):  allopurinol 100 milliGRAM(s) Oral daily  aspirin enteric coated 81 milliGRAM(s) Oral daily  atorvastatin 80 milliGRAM(s) Oral at bedtime  calcium acetate 667 milliGRAM(s) Oral three times a day with meals  carvedilol 6.25 milliGRAM(s) Oral every 12 hours  clopidogrel Tablet 75 milliGRAM(s) Oral daily  dextrose 5%. 1000 milliLiter(s) (50 mL/Hr) IV Continuous <Continuous>  dextrose 5%. 1000 milliLiter(s) (100 mL/Hr) IV Continuous <Continuous>  dextrose 50% Injectable 25 Gram(s) IV Push once  dextrose 50% Injectable 12.5 Gram(s) IV Push once  dextrose 50% Injectable 25 Gram(s) IV Push once  epoetin wayne-epbx (RETACRIT) Injectable 10171 Unit(s) IV Push once  folic acid 1 milliGRAM(s) Oral daily  gabapentin 100 milliGRAM(s) Oral daily  glucagon  Injectable 1 milliGRAM(s) IntraMuscular once  heparin   Injectable 5000 Unit(s) SubCutaneous every 8 hours  insulin glargine Injectable (LANTUS) 24 Unit(s) SubCutaneous at bedtime  insulin lispro (ADMELOG) corrective regimen sliding scale   SubCutaneous at bedtime  insulin lispro (ADMELOG) corrective regimen sliding scale   SubCutaneous three times a day before meals  insulin lispro Injectable (ADMELOG) 8 Unit(s) SubCutaneous three times a day before meals  pantoprazole    Tablet 40 milliGRAM(s) Oral before breakfast  senna 2 Tablet(s) Oral at bedtime  sertraline 25 milliGRAM(s) Oral daily  valsartan 40 milliGRAM(s) Oral daily      allopurinol   100 milliGRAM(s) Oral (09-16-24 @ 11:28)    atorvastatin   80 milliGRAM(s) Oral (09-16-24 @ 22:18)    insulin glargine Injectable (LANTUS)   24 Unit(s) SubCutaneous (09-16-24 @ 22:18)    insulin lispro (ADMELOG) corrective regimen sliding scale   1 Unit(s) SubCutaneous (09-16-24 @ 17:00)    insulin lispro Injectable (ADMELOG)   6 Unit(s) SubCutaneous (09-17-24 @ 09:05)   6 Unit(s) SubCutaneous (09-16-24 @ 17:01)        PHYSICAL EXAM:  VITALS: T(C): 36.6 (09-17-24 @ 11:22)  T(F): 97.8 (09-17-24 @ 11:22), Max: 98.3 (09-17-24 @ 05:56)  HR: 71 (09-17-24 @ 11:22) (71 - 80)  BP: 135/74 (09-17-24 @ 11:22) (135/74 - 152/82)  RR:  (16 - 18)  SpO2:  (93% - 99%)  Wt(kg): --  GENERAL: male laying in bed, in NAD  Respiratory: Respirations unlabored, intermittent cough   Extremities: Warm, no edema  NEURO: Alert , appropriate     LABS:  POCT Blood Glucose.: 116 mg/dL (09-17-24 @ 08:50)  POCT Blood Glucose.: 212 mg/dL (09-16-24 @ 22:08)  POCT Blood Glucose.: 186 mg/dL (09-16-24 @ 16:18)  POCT Blood Glucose.: 120 mg/dL (09-16-24 @ 13:10)  POCT Blood Glucose.: 132 mg/dL (09-16-24 @ 11:46)  POCT Blood Glucose.: 60 mg/dL (09-16-24 @ 11:19)  POCT Blood Glucose.: 66 mg/dL (09-16-24 @ 11:16)  POCT Blood Glucose.: 332 mg/dL (09-16-24 @ 07:49)  POCT Blood Glucose.: 315 mg/dL (09-15-24 @ 21:10)  POCT Blood Glucose.: 429 mg/dL (09-15-24 @ 16:48)  POCT Blood Glucose.: 340 mg/dL (09-15-24 @ 11:50)  POCT Blood Glucose.: 304 mg/dL (09-15-24 @ 07:36)  POCT Blood Glucose.: 332 mg/dL (09-14-24 @ 21:34)  POCT Blood Glucose.: 326 mg/dL (09-14-24 @ 16:44)                          9.5    7.36  )-----------( 190      ( 15 Sep 2024 06:54 )             30.3               Thyroid Function Tests:      A1C with Estimated Average Glucose Result: 10.2 % (09-13-24 @ 07:17)    Estimated Average Glucose: 246 mg/dL (09-13-24 @ 07:17)        Diet, Consistent Carbohydrate w/Evening Snack (09-12-24 @ 17:44) [Active]

## 2024-09-18 NOTE — PROGRESS NOTE ADULT - PROVIDER SPECIALTY LIST ADULT
Cardiology
Internal Medicine
Nephrology
Nephrology
Cardiology
Endocrinology
Nephrology
Internal Medicine
Nephrology
Endocrinology

## 2024-09-18 NOTE — PROVIDER CONTACT NOTE (HYPOGLYCEMIA EVENT) - NSPROVCONTCHANGEINDIABETESPLAN
-- DO NOT REPLY / DO NOT REPLY ALL --  -- This inbox is not monitored. If this was sent to the wrong provider or department, reroute message to [facility] Reroute pool. --  -- Message is from [facility]agement [facility] Operations ([facility]) --    General Patient Message: Patient returning office call no information was left    Caller Information       Contact Date/Time Type Contact Phone/Fax    09/18/2024 11:32 AM CDT Phone (Incoming) Allan Matt (Self) [phone] (M)            Alternative phone number: none    Can a detailed message be left? Yes - Voicemail   Patient has been advised the message will be addressed within 2-3 business days.                
No
No

## 2024-09-18 NOTE — DISCHARGE NOTE NURSING/CASE MANAGEMENT/SOCIAL WORK - PATIENT PORTAL LINK FT
You can access the FollowMyHealth Patient Portal offered by [facility] by registering at the following website: [email or web address]. By joining [facility]’s FollowMyHealth portal, you will also be able to view your health information using other applications (apps) compatible with our system.

## 2024-09-18 NOTE — PROGRESS NOTE ADULT - ASSESSMENT
64M with T2DM, ESRD on HD MWF, CAD s/p stents, HTN, HLD admitted for COVID, receiving remdesivir. Endocrine consulted for uncontrolled T2DM with hyperglycemia and hypoglycemia (high risk patient with severely uncontrolled Type 2 DM w/ hyperglycemia with A1c of 10.2% at high risk of CAD and CVA with high medical complexity and high level decision-making). BG Goal 100-180mg/dl   Last 24 hour BGs 134-200s, with fasting , mostly above goal       #Uncontrolled T2DM with hyperglycemia and hypoglycemia  T2DM was diagnosed more than 20+ years  Complicated by CAD s/p stents, diabetic retinopathy, neuropathy, nephropathy progressed to ESRD on HD.  Endocrinologist: Dr. Hernandez, Last saw NP Sari Uhr 8/20/24  Home regimen: Toujeo 24 units QHS, Humalog 12 units TIDAC + sliding scale. Reports hasn't taken trulicity for 1 month because of "laziness," he does not recall his last trulicity dose. Use Freestyle Homer 2   A1c: 10.2%  Diet: Reports he eats Chinese food like rice, vegetables, meat. Eats lots of fruit.    Inpatient plan:  - Inpatient BG goal 100-180  - Fasting BG above goal, increase Lantus to 24 units at HS tonight preventively   - Would increase Admelog to 10 units TIDAC; HOLD IF NPO OR IF EATING LESS THAN 50% OF TRAY  - Low dose admelog correction scale TIDAC  - Low dose admelog correction scale QHS  - CC diet  - Please keep hypoglycemia protocol in place     Discharge plan:  - Discharge regimen: Home Basal/bolus insulin and Restart trulicity at 0.75mg weekly on discharge.  Recommend Toujeo 24units at HS  Recommend Humalog 10 units before each meals  ( Hold if not eating )  Restart Trulicity 0.75 mg weekly ( has trulicity 0.75 mg dose home)  pt should check BGs ACTID and at HS, and when CGM ( Freestyle Homer 2 ) alarms go off. Contact endocrinologist if BG < 70 X1, > 400 X1, or persistently >200  Please make suer pt has DM supplies ( Glucometer, Lancets, Strips and alcohol pads)  Please make sure pt has all DM Rx ( Toujeo pen, Humalog pen, pen needles, Trulicity 0.75 mg SQ weekly )   - Endocrine follow up: Dr. Hernandez. Has appointment on 11/26/24 at 11:20   - Routine ophthalmology and podiatry follow up    #HTN  - goal <130/80  - on carvedilol 6.25mg BID, valsartan 40mg daily  - management by primary team/neprhology    #HLD  - continue atorvastatin 80mg qhs  - check lipid panel outpatient    Contact via Microsoft Teams during business hours  To reach covering provider access AMION via sunrise tools  For Urgent matters/after-hours/weekends/holidays please page endocrine fellow on call   For nonurgent matters please email [email or web address]    Please note that this patient may be followed by different provider tomorrow.  Notify endocrine 24 hours prior to discharge for final recommendations

## 2024-09-18 NOTE — PROGRESS NOTE ADULT - ASSESSMENT
64   yr      hx CAD   s/p  2 drug eluding stents 11/2022       CHF, DM, gout, ESRD on dialysis      c/c  weakness  of   limbs/  neuro dr mccain/ h/o   gout, right  wrist,  has  functional  quadriplegia/  CT  head, . 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      pt  with  h/o intermittent  right  arm /leg weakness  has been   c/c  for  yrs/  with  unclear  etiology / MRI  head/  c spine in 10/22,  no cord  compression       seen by neuro in  past,   per  wife,  thinks  pt  has  a psychological  component  regarding  his  weakness/  s/p Strep pneumoniae  bacteremia  and pna           now admitted    with sob/ from covid/  fluid  overload          covid/  cxr , infiltrates , on iv remdisivir     Gout     CAD,    s/p    pci.        on  asa/ plavix/   lipitor,       Echo,  ef  45. on 5/.23     CKD, on HD / c/c  anemia           renal  dr dickerson      c/c  anemia     DM,  follow  fs           on lantus/  endo  f/p     on  dvt ppx/ gabapentin,  asa. / plavix,  lipitor,  coreg., valsartan,  lantus,  zolofr. allopurinol     o2  sat  94,   completed  iv remdisivir        d/c  plans        rad< from: Xray Chest 1 View- PORTABLE-Urgent (Xray Chest 1 View- PORTABLE-Urgent .) (09.12.24 @ 14:13) >  MPRESSION:  Mild pulmonary venous congestion/perihilar interstitial edema, new.  Right perihilar/lower lobe interstitial infiltrates again evident.  --- End of Report ---

## 2024-09-18 NOTE — PROGRESS NOTE ADULT - ASSESSMENT
64-year male past medical history ESRD MWF, CAD s/p stents, HTN, HLD, DM presents with COVID  Mild heart failure     1 Renal- HD today  and Retcrit at hd  Hyperphosphatemia- Phoslo ordered  2 CVS- BP is stable;     3 Endo-Cont statins     DC planning     Sayed [facility]   [place]   [phone]

## 2024-09-30 NOTE — ED ADULT NURSE NOTE - GASTROINTESTINAL WDL
Do you want this triage? And where might you like to add her?   Abdomen soft, nontender, nondistended, bowel sounds present in all 4 quadrants.

## 2024-10-15 PROCEDURE — 85025 COMPLETE CBC W/AUTO DIFF WBC: CPT

## 2024-10-15 PROCEDURE — 84295 ASSAY OF SERUM SODIUM: CPT

## 2024-10-15 PROCEDURE — 71250 CT THORAX DX C-: CPT | Mod: MC

## 2024-10-15 PROCEDURE — 83036 HEMOGLOBIN GLYCOSYLATED A1C: CPT

## 2024-10-15 PROCEDURE — 85014 HEMATOCRIT: CPT

## 2024-10-15 PROCEDURE — 96374 THER/PROPH/DIAG INJ IV PUSH: CPT

## 2024-10-15 PROCEDURE — 82803 BLOOD GASES ANY COMBINATION: CPT

## 2024-10-15 PROCEDURE — 80053 COMPREHEN METABOLIC PANEL: CPT

## 2024-10-15 PROCEDURE — 85027 COMPLETE CBC AUTOMATED: CPT

## 2024-10-15 PROCEDURE — 87340 HEPATITIS B SURFACE AG IA: CPT

## 2024-10-15 PROCEDURE — 82435 ASSAY OF BLOOD CHLORIDE: CPT

## 2024-10-15 PROCEDURE — 99261: CPT

## 2024-10-15 PROCEDURE — 82947 ASSAY GLUCOSE BLOOD QUANT: CPT

## 2024-10-15 PROCEDURE — 87040 BLOOD CULTURE FOR BACTERIA: CPT

## 2024-10-15 PROCEDURE — 84132 ASSAY OF SERUM POTASSIUM: CPT

## 2024-10-15 PROCEDURE — 82330 ASSAY OF CALCIUM: CPT

## 2024-10-15 PROCEDURE — 84100 ASSAY OF PHOSPHORUS: CPT

## 2024-10-15 PROCEDURE — 87637 SARSCOV2&INF A&B&RSV AMP PRB: CPT

## 2024-10-15 PROCEDURE — 36415 COLL VENOUS BLD VENIPUNCTURE: CPT

## 2024-10-15 PROCEDURE — 83605 ASSAY OF LACTIC ACID: CPT

## 2024-10-15 PROCEDURE — 71045 X-RAY EXAM CHEST 1 VIEW: CPT

## 2024-10-15 PROCEDURE — 83735 ASSAY OF MAGNESIUM: CPT

## 2024-10-15 PROCEDURE — 83690 ASSAY OF LIPASE: CPT

## 2024-10-15 PROCEDURE — 99285 EMERGENCY DEPT VISIT HI MDM: CPT

## 2024-10-15 PROCEDURE — 85018 HEMOGLOBIN: CPT

## 2024-10-15 PROCEDURE — 82962 GLUCOSE BLOOD TEST: CPT

## 2024-10-15 PROCEDURE — 80048 BASIC METABOLIC PNL TOTAL CA: CPT

## 2024-10-18 RX ORDER — BLOOD-GLUCOSE,RECEIVER,CONT
EACH MISCELLANEOUS
Qty: 1 | Refills: 0 | Status: ACTIVE | COMMUNITY
Start: 2024-10-18 | End: 1900-01-01

## 2024-10-28 ENCOUNTER — TRANSCRIPTION ENCOUNTER (OUTPATIENT)
Age: 64
End: 2024-10-28

## 2024-11-26 ENCOUNTER — APPOINTMENT (OUTPATIENT)
Dept: ENDOCRINOLOGY | Facility: CLINIC | Age: 64
End: 2024-11-26
Payer: MEDICARE

## 2024-11-26 VITALS
BODY MASS INDEX: 30.13 KG/M2 | OXYGEN SATURATION: 99 % | WEIGHT: 192 LBS | DIASTOLIC BLOOD PRESSURE: 70 MMHG | HEIGHT: 67 IN | SYSTOLIC BLOOD PRESSURE: 132 MMHG | HEART RATE: 98 BPM

## 2024-11-26 DIAGNOSIS — I10 ESSENTIAL (PRIMARY) HYPERTENSION: ICD-10-CM

## 2024-11-26 DIAGNOSIS — E78.00 PURE HYPERCHOLESTEROLEMIA, UNSPECIFIED: ICD-10-CM

## 2024-11-26 DIAGNOSIS — E11.9 TYPE 2 DIABETES MELLITUS W/OUT COMPLICATIONS: ICD-10-CM

## 2024-11-26 DIAGNOSIS — E11.319 TYPE 2 DIABETES MELLITUS WITH UNSPECIFIED DIABETIC RETINOPATHY W/OUT MACULAR EDEMA: ICD-10-CM

## 2024-11-26 PROCEDURE — 83036 HEMOGLOBIN GLYCOSYLATED A1C: CPT | Mod: QW

## 2024-11-26 PROCEDURE — 99214 OFFICE O/P EST MOD 30 MIN: CPT

## 2024-11-26 PROCEDURE — G2211 COMPLEX E/M VISIT ADD ON: CPT

## 2024-11-26 RX ORDER — PEN NEEDLE, DIABETIC 32 GX 1/4"
32G X 6 MM NEEDLE, DISPOSABLE MISCELLANEOUS
Qty: 400 | Refills: 1 | Status: ACTIVE | COMMUNITY
Start: 2024-11-26 | End: 1900-01-01

## 2024-11-27 LAB — HBA1C MFR BLD HPLC: 7.8

## 2024-12-13 NOTE — PROGRESS NOTE ADULT - PROBLEM/PLAN-3
Please inform patient her HCG was elevated, we will have her repeat this in 6 days (7 days from last draw) we need her to go to lab soon to have her blood type drawn to see if she will need rhogam. She should monitor for bleeding and pelvic cramping and pain.   All other labwork normal. STD testing negative for hep B, Hep C, HIV 1/2 and Syphilis. Vaginal cultures still pending.    Thank you, Leigh Ann
DISPLAY PLAN FREE TEXT

## 2025-01-10 NOTE — PATIENT PROFILE ADULT - MONEY FOR FOOD
Dad returning call   Message confirmed with caller.   Call connected to [facility] Peds Triage Queue.  Routed to Provider's clinical pool.     
Judd Aldana  P Psr Ped [facility] [address] Pool2 hours ago (5:51 AM)     LL  Appointment for: Judd Aldana ([id number])  Visit type: PC ACUTE ([id number])  1/10/2025 9:30 AM (15 minutes) with Kamila Emanuel MD in [facility] [place] [address] MAIN PED     Patient comments:  Hit his head on the gym floor yesterday, dizzy. Complaining of throat pain.Is vomiting is linked to head injury or throat?       
Left message to call the office back during normal business hours.   
Spoke with dad who says yesterday afternoon pt was running around in school gym. He fell and hit the back of his head on the floor. Pt got up right away and started playing again. His pupils were equal, no swelling, pt ate fine later and acting fine last night.   Pt woke up early this morning complaining of a sore throat and has vomited a few times.  Per Dr. Emanuel, ok to see pt this morning. We can also rule out strep.  Advised to not give pt anything to eat or drink right now and let his stomach rest as he just vomited.  After at least a half hour then to just give one teaspoon of the watered down Gatorade dad has been giving him every 5 minutes or so. We will be seeing him in the office in an hour.  Dad agrees.   
no

## 2025-02-25 ENCOUNTER — NON-APPOINTMENT (OUTPATIENT)
Age: 65
End: 2025-02-25

## 2025-02-25 ENCOUNTER — APPOINTMENT (OUTPATIENT)
Dept: ENDOCRINOLOGY | Facility: CLINIC | Age: 65
End: 2025-02-25
Payer: MEDICARE

## 2025-02-25 VITALS
WEIGHT: 190 LBS | HEIGHT: 67 IN | OXYGEN SATURATION: 96 % | BODY MASS INDEX: 29.82 KG/M2 | DIASTOLIC BLOOD PRESSURE: 80 MMHG | SYSTOLIC BLOOD PRESSURE: 124 MMHG | HEART RATE: 88 BPM

## 2025-02-25 DIAGNOSIS — E11.9 TYPE 2 DIABETES MELLITUS W/OUT COMPLICATIONS: ICD-10-CM

## 2025-02-25 DIAGNOSIS — E53.8 DEFICIENCY OF OTHER SPECIFIED B GROUP VITAMINS: ICD-10-CM

## 2025-02-25 DIAGNOSIS — I10 ESSENTIAL (PRIMARY) HYPERTENSION: ICD-10-CM

## 2025-02-25 DIAGNOSIS — E55.9 VITAMIN D DEFICIENCY, UNSPECIFIED: ICD-10-CM

## 2025-02-25 LAB
GLUCOSE BLDC GLUCOMTR-MCNC: 81
HBA1C MFR BLD HPLC: 8.4

## 2025-02-25 PROCEDURE — 95251 CONT GLUC MNTR ANALYSIS I&R: CPT

## 2025-02-25 PROCEDURE — 99214 OFFICE O/P EST MOD 30 MIN: CPT

## 2025-02-25 PROCEDURE — 82962 GLUCOSE BLOOD TEST: CPT

## 2025-02-25 PROCEDURE — 83036 HEMOGLOBIN GLYCOSYLATED A1C: CPT | Mod: QW

## 2025-02-25 PROCEDURE — G2211 COMPLEX E/M VISIT ADD ON: CPT

## 2025-02-26 RX ORDER — INSULIN PMP CART,AUT,G6/7,CNTR
EACH SUBCUTANEOUS
Qty: 1 | Refills: 0 | Status: ACTIVE | COMMUNITY
Start: 2025-02-25 | End: 1900-01-01

## 2025-02-26 RX ORDER — INSULIN PMP CART,AUT,G6/7,CNTR
EACH SUBCUTANEOUS
Qty: 2 | Refills: 11 | Status: ACTIVE | COMMUNITY
Start: 2025-02-25 | End: 1900-01-01

## 2025-03-22 ENCOUNTER — OUTPATIENT (OUTPATIENT)
Dept: OUTPATIENT SERVICES | Facility: HOSPITAL | Age: 65
LOS: 1 days | End: 2025-03-22
Payer: MEDICARE

## 2025-03-22 ENCOUNTER — APPOINTMENT (OUTPATIENT)
Dept: RADIOLOGY | Facility: CLINIC | Age: 65
End: 2025-03-22
Payer: MEDICARE

## 2025-03-22 DIAGNOSIS — R05.1 ACUTE COUGH: ICD-10-CM

## 2025-03-22 DIAGNOSIS — Z98.89 OTHER SPECIFIED POSTPROCEDURAL STATES: Chronic | ICD-10-CM

## 2025-03-22 DIAGNOSIS — Z00.8 ENCOUNTER FOR OTHER GENERAL EXAMINATION: ICD-10-CM

## 2025-03-22 DIAGNOSIS — Z98.890 OTHER SPECIFIED POSTPROCEDURAL STATES: Chronic | ICD-10-CM

## 2025-03-22 DIAGNOSIS — Z95.5 PRESENCE OF CORONARY ANGIOPLASTY IMPLANT AND GRAFT: Chronic | ICD-10-CM

## 2025-03-22 DIAGNOSIS — I77.0 ARTERIOVENOUS FISTULA, ACQUIRED: Chronic | ICD-10-CM

## 2025-03-22 PROCEDURE — 71046 X-RAY EXAM CHEST 2 VIEWS: CPT | Mod: 26

## 2025-03-22 PROCEDURE — 71046 X-RAY EXAM CHEST 2 VIEWS: CPT

## 2025-04-08 ENCOUNTER — APPOINTMENT (OUTPATIENT)
Dept: VASCULAR SURGERY | Facility: CLINIC | Age: 65
End: 2025-04-08
Payer: MEDICARE

## 2025-04-08 VITALS
DIASTOLIC BLOOD PRESSURE: 86 MMHG | HEIGHT: 67 IN | TEMPERATURE: 98.2 F | BODY MASS INDEX: 29.82 KG/M2 | HEART RATE: 94 BPM | WEIGHT: 190 LBS | SYSTOLIC BLOOD PRESSURE: 189 MMHG

## 2025-04-08 DIAGNOSIS — R25.2 CRAMP AND SPASM: ICD-10-CM

## 2025-04-08 PROCEDURE — 93922 UPR/L XTREMITY ART 2 LEVELS: CPT

## 2025-04-08 PROCEDURE — 99213 OFFICE O/P EST LOW 20 MIN: CPT
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
## 2025-06-03 ENCOUNTER — APPOINTMENT (OUTPATIENT)
Dept: ENDOCRINOLOGY | Facility: CLINIC | Age: 65
End: 2025-06-03
Payer: MEDICARE

## 2025-06-03 VITALS
HEIGHT: 67 IN | DIASTOLIC BLOOD PRESSURE: 84 MMHG | OXYGEN SATURATION: 96 % | BODY MASS INDEX: 31.08 KG/M2 | WEIGHT: 198 LBS | HEART RATE: 88 BPM | SYSTOLIC BLOOD PRESSURE: 144 MMHG

## 2025-06-03 LAB
GLUCOSE BLDC GLUCOMTR-MCNC: 512
GLUCOSE BLDC GLUCOMTR-MCNC: 517
HBA1C MFR BLD HPLC: 11.6

## 2025-06-03 PROCEDURE — 83036 HEMOGLOBIN GLYCOSYLATED A1C: CPT | Mod: QW

## 2025-06-03 PROCEDURE — 95251 CONT GLUC MNTR ANALYSIS I&R: CPT

## 2025-06-03 PROCEDURE — 99214 OFFICE O/P EST MOD 30 MIN: CPT

## 2025-06-03 PROCEDURE — G2211 COMPLEX E/M VISIT ADD ON: CPT

## 2025-06-03 PROCEDURE — 82962 GLUCOSE BLOOD TEST: CPT

## 2025-06-05 RX ORDER — DULAGLUTIDE 0.75 MG/.5ML
0.75 INJECTION, SOLUTION SUBCUTANEOUS
Qty: 1 | Refills: 0 | Status: ACTIVE | COMMUNITY
Start: 2025-06-05

## 2025-06-05 RX ORDER — INSULIN GLARGINE 100 [IU]/ML
100 INJECTION, SOLUTION SUBCUTANEOUS
Qty: 10 | Refills: 2 | Status: ACTIVE | COMMUNITY
Start: 2025-06-05

## 2025-06-09 ENCOUNTER — INPATIENT (INPATIENT)
Facility: HOSPITAL | Age: 65
LOS: 3 days | Discharge: HOME | DRG: 91 | End: 2025-06-13
Attending: STUDENT IN AN ORGANIZED HEALTH CARE EDUCATION/TRAINING PROGRAM | Admitting: STUDENT IN AN ORGANIZED HEALTH CARE EDUCATION/TRAINING PROGRAM
Payer: MEDICARE

## 2025-06-09 VITALS
DIASTOLIC BLOOD PRESSURE: 73 MMHG | WEIGHT: 210.1 LBS | SYSTOLIC BLOOD PRESSURE: 136 MMHG | HEIGHT: 66 IN | RESPIRATION RATE: 18 BRPM | OXYGEN SATURATION: 98 % | TEMPERATURE: 100 F | HEART RATE: 84 BPM

## 2025-06-09 DIAGNOSIS — R53.81 OTHER MALAISE: ICD-10-CM

## 2025-06-09 LAB
ALBUMIN SERPL ELPH-MCNC: 3.4 G/DL — SIGNIFICANT CHANGE UP (ref 3.3–5)
ALP SERPL-CCNC: 123 U/L — HIGH (ref 40–120)
ALT FLD-CCNC: 24 U/L — SIGNIFICANT CHANGE UP (ref 10–45)
ANION GAP SERPL CALC-SCNC: 20 MMOL/L — HIGH (ref 5–17)
APTT BLD: 29.8 SEC — SIGNIFICANT CHANGE UP (ref 26.1–36.8)
AST SERPL-CCNC: 31 U/L — SIGNIFICANT CHANGE UP (ref 10–40)
BASOPHILS # BLD AUTO: 0.04 K/UL — SIGNIFICANT CHANGE UP (ref 0–0.2)
BASOPHILS NFR BLD AUTO: 0.5 % — SIGNIFICANT CHANGE UP (ref 0–2)
BILIRUB SERPL-MCNC: 0.3 MG/DL — SIGNIFICANT CHANGE UP (ref 0.2–1.2)
BUN SERPL-MCNC: 87 MG/DL — HIGH (ref 7–23)
CALCIUM SERPL-MCNC: 8.1 MG/DL — LOW (ref 8.4–10.5)
CHLORIDE SERPL-SCNC: 99 MMOL/L — SIGNIFICANT CHANGE UP (ref 96–108)
CO2 SERPL-SCNC: 20 MMOL/L — LOW (ref 22–31)
CREAT SERPL-MCNC: 11.3 MG/DL — HIGH (ref 0.5–1.3)
EGFR: 5 ML/MIN/1.73M2 — LOW
EGFR: 5 ML/MIN/1.73M2 — LOW
EOSINOPHIL # BLD AUTO: 0.55 K/UL — HIGH (ref 0–0.5)
EOSINOPHIL NFR BLD AUTO: 7.3 % — HIGH (ref 0–6)
FLUAV AG NPH QL: SIGNIFICANT CHANGE UP
FLUBV AG NPH QL: SIGNIFICANT CHANGE UP
GAS PNL BLDV: SIGNIFICANT CHANGE UP
GLUCOSE SERPL-MCNC: 132 MG/DL — HIGH (ref 70–99)
HCT VFR BLD CALC: 34.1 % — LOW (ref 39–50)
HGB BLD-MCNC: 10.8 G/DL — LOW (ref 13–17)
IMM GRANULOCYTES NFR BLD AUTO: 0.4 % — SIGNIFICANT CHANGE UP (ref 0–0.9)
INR BLD: 1.04 RATIO — SIGNIFICANT CHANGE UP (ref 0.85–1.16)
LYMPHOCYTES # BLD AUTO: 0.67 K/UL — LOW (ref 1–3.3)
LYMPHOCYTES # BLD AUTO: 8.9 % — LOW (ref 13–44)
MAGNESIUM SERPL-MCNC: 2.4 MG/DL — SIGNIFICANT CHANGE UP (ref 1.6–2.6)
MCHC RBC-ENTMCNC: 29.4 PG — SIGNIFICANT CHANGE UP (ref 27–34)
MCHC RBC-ENTMCNC: 31.7 G/DL — LOW (ref 32–36)
MCV RBC AUTO: 92.9 FL — SIGNIFICANT CHANGE UP (ref 80–100)
MONOCYTES # BLD AUTO: 0.66 K/UL — SIGNIFICANT CHANGE UP (ref 0–0.9)
MONOCYTES NFR BLD AUTO: 8.7 % — SIGNIFICANT CHANGE UP (ref 2–14)
NEUTROPHILS # BLD AUTO: 5.6 K/UL — SIGNIFICANT CHANGE UP (ref 1.8–7.4)
NEUTROPHILS NFR BLD AUTO: 74.2 % — SIGNIFICANT CHANGE UP (ref 43–77)
NRBC BLD AUTO-RTO: 0 /100 WBCS — SIGNIFICANT CHANGE UP (ref 0–0)
PHOSPHATE SERPL-MCNC: 5.2 MG/DL — HIGH (ref 2.5–4.5)
PLATELET # BLD AUTO: 170 K/UL — SIGNIFICANT CHANGE UP (ref 150–400)
POTASSIUM SERPL-MCNC: 4.9 MMOL/L — SIGNIFICANT CHANGE UP (ref 3.5–5.3)
POTASSIUM SERPL-SCNC: 4.9 MMOL/L — SIGNIFICANT CHANGE UP (ref 3.5–5.3)
PROT SERPL-MCNC: 6.5 G/DL — SIGNIFICANT CHANGE UP (ref 6–8.3)
PROTHROM AB SERPL-ACNC: 12 SEC — SIGNIFICANT CHANGE UP (ref 9.9–13.4)
RBC # BLD: 3.67 M/UL — LOW (ref 4.2–5.8)
RBC # FLD: 15.5 % — HIGH (ref 10.3–14.5)
RSV RNA NPH QL NAA+NON-PROBE: SIGNIFICANT CHANGE UP
SARS-COV-2 RNA SPEC QL NAA+PROBE: SIGNIFICANT CHANGE UP
SODIUM SERPL-SCNC: 139 MMOL/L — SIGNIFICANT CHANGE UP (ref 135–145)
SOURCE RESPIRATORY: SIGNIFICANT CHANGE UP
TROPONIN T, HIGH SENSITIVITY RESULT: 192 NG/L — HIGH (ref 0–51)
WBC # BLD: 7.55 K/UL — SIGNIFICANT CHANGE UP (ref 3.8–10.5)
WBC # FLD AUTO: 7.55 K/UL — SIGNIFICANT CHANGE UP (ref 3.8–10.5)

## 2025-06-09 RX ORDER — ACETAMINOPHEN 500 MG/5ML
1000 LIQUID (ML) ORAL ONCE
Refills: 0 | Status: COMPLETED | OUTPATIENT
Start: 2025-06-09 | End: 2025-06-09

## 2025-06-09 RX ADMIN — Medication 400 MILLIGRAM(S): at 21:43

## 2025-06-09 NOTE — ED PROVIDER NOTE - PROGRESS NOTE DETAILS
Jax Mayfield MD: Spoke with Dr. Jose from [facility] who will arrange HD tomorrow. Electrolytes nonactionable for urgent HD. CXR clear to wet read. Flu/COVID negative, full RVP ordered.

## 2025-06-09 NOTE — ED ADULT NURSE NOTE - NSFALLRISKINTERV_ED_ALL_ED

## 2025-06-09 NOTE — ED ADULT TRIAGE NOTE - ESI TRIAGE ACUITY LEVEL, MLM
Anesthesia Post Evaluation    Patient: Dariel Urbina    Procedure(s) Performed: Procedure(s) (LRB):  LIGATION, ARTERY, ETHMOIDAL (Left)  FESS, WITH IMAGING GUIDANCE (Left)    Final Anesthesia Type: general      Patient location during evaluation: PACU  Patient participation: Yes- Able to Participate  Level of consciousness: awake and alert  Post-procedure vital signs: reviewed and stable  Pain management: adequate  Airway patency: patent    PONV status at discharge: No PONV  Anesthetic complications: no      Cardiovascular status: stable  Respiratory status: unassisted and spontaneous ventilation  Hydration status: euvolemic  Follow-up not needed.              Vitals Value Taken Time   /69 02/28/24 1900   Temp 36.5 °C (97.7 °F) 02/28/24 1845   Pulse 62 02/28/24 1900   Resp 17 02/28/24 1900   SpO2 99 % 02/28/24 1900         Event Time   Out of Recovery 15:15:00         Pain/Sanjeev Score: No data recorded        
1

## 2025-06-09 NOTE — ED PROVIDER NOTE - CLINICAL SUMMARY MEDICAL DECISION MAKING FREE TEXT BOX
HPI: Missed dialysis today due to generalized malaise, fevers, cough for multiple days. States nephrologist is Dr. Bear (sp?). Denies chest pain, nausea, vomiting.    PMH: ESRD with HD M/W/F    Exam:  T(F): 100; HR: 84; BP: 136/73; RR: 18; SpO2: 98%  General: alert, oriented to person, time, place  Psych: mood appropriate  Head: normocephalic; atraumatic  Eyes: conjunctivae clear bilaterally, sclerae anicteric  ENT: no nasal flaring, patent nares  Cardio: RRR, no m/r/g, pulses 2+ b/l  Resp: CATB, no w/r/r  GI: soft/distended/nontender  Neuro: normal sensation, moving all four extremities equally  Skin: No evidence of rash or bruising  MSK: normal movement of all extremities  Lymph/Vasc: no LE edema    Brief Summary: Elderly male who missed HD today due to respiratory symptoms with fever in ED.  DDX: acute viral syndrome, pneumonia, electrolyte abnormality    Plan:  - Labs  - CXR  - Viral swab  - Likely admit for HD

## 2025-06-09 NOTE — ED ADULT NURSE NOTE - OBJECTIVE STATEMENT
66 y/o male PMH ESRD on dialysis MWF presents to ED via EMS from home c/o generalized weakness, malaise, fever, cough x few days. Pt missed dialysis today due to symptoms. Pt's family wanted to bring him to ED earlier today, but pt refused at the time and decided to come to ED once weakness became profound. Per EMS, pt was hypoxic to mid 80s%. Arrived on 2L O2. Denies chest pain, nausea, vomiting. Pt is A&O x 4. Breathing even, states he feels SOB. Room air sat was 92% in ED, placed back on 2L O2.  Gross motor and neuro intact. Abdomen is distended, nontender to palpation. NSR on CM. Safety and comfort provided.

## 2025-06-10 DIAGNOSIS — J96.01 ACUTE RESPIRATORY FAILURE WITH HYPOXIA: ICD-10-CM

## 2025-06-10 DIAGNOSIS — G92.8 OTHER TOXIC ENCEPHALOPATHY: ICD-10-CM

## 2025-06-10 DIAGNOSIS — N18.6 END STAGE RENAL DISEASE: ICD-10-CM

## 2025-06-10 DIAGNOSIS — Z79.899 OTHER LONG TERM (CURRENT) DRUG THERAPY: ICD-10-CM

## 2025-06-10 DIAGNOSIS — Z29.9 ENCOUNTER FOR PROPHYLACTIC MEASURES, UNSPECIFIED: ICD-10-CM

## 2025-06-10 DIAGNOSIS — E16.2 HYPOGLYCEMIA, UNSPECIFIED: ICD-10-CM

## 2025-06-10 LAB
A1C WITH ESTIMATED AVERAGE GLUCOSE RESULT: 12.4 % — HIGH (ref 4–5.6)
A1C WITH ESTIMATED AVERAGE GLUCOSE RESULT: 12.8 % — HIGH (ref 4–5.6)
ADD ON TEST-SPECIMEN IN LAB: SIGNIFICANT CHANGE UP
ALBUMIN SERPL ELPH-MCNC: 3.6 G/DL — SIGNIFICANT CHANGE UP (ref 3.3–5)
ALP SERPL-CCNC: 112 U/L — SIGNIFICANT CHANGE UP (ref 40–120)
ALT FLD-CCNC: 23 U/L — SIGNIFICANT CHANGE UP (ref 10–45)
ANION GAP SERPL CALC-SCNC: 21 MMOL/L — HIGH (ref 5–17)
ANION GAP SERPL CALC-SCNC: 22 MMOL/L — HIGH (ref 5–17)
APPEARANCE UR: ABNORMAL
AST SERPL-CCNC: 18 U/L — SIGNIFICANT CHANGE UP (ref 10–40)
BACTERIA # UR AUTO: NEGATIVE /HPF — SIGNIFICANT CHANGE UP
BASOPHILS # BLD AUTO: 0.05 K/UL — SIGNIFICANT CHANGE UP (ref 0–0.2)
BASOPHILS # BLD AUTO: 0.06 K/UL — SIGNIFICANT CHANGE UP (ref 0–0.2)
BASOPHILS NFR BLD AUTO: 0.6 % — SIGNIFICANT CHANGE UP (ref 0–2)
BASOPHILS NFR BLD AUTO: 0.9 % — SIGNIFICANT CHANGE UP (ref 0–2)
BILIRUB SERPL-MCNC: 0.3 MG/DL — SIGNIFICANT CHANGE UP (ref 0.2–1.2)
BILIRUB UR-MCNC: NEGATIVE — SIGNIFICANT CHANGE UP
BUN SERPL-MCNC: 88 MG/DL — HIGH (ref 7–23)
BUN SERPL-MCNC: 91 MG/DL — HIGH (ref 7–23)
CALCIUM SERPL-MCNC: 7.9 MG/DL — LOW (ref 8.4–10.5)
CALCIUM SERPL-MCNC: 8.2 MG/DL — LOW (ref 8.4–10.5)
CAST: 9 /LPF — HIGH (ref 0–4)
CHLORIDE SERPL-SCNC: 96 MMOL/L — SIGNIFICANT CHANGE UP (ref 96–108)
CHLORIDE SERPL-SCNC: 99 MMOL/L — SIGNIFICANT CHANGE UP (ref 96–108)
CO2 SERPL-SCNC: 18 MMOL/L — LOW (ref 22–31)
CO2 SERPL-SCNC: 19 MMOL/L — LOW (ref 22–31)
COLOR SPEC: YELLOW — SIGNIFICANT CHANGE UP
CREAT SERPL-MCNC: 11.47 MG/DL — HIGH (ref 0.5–1.3)
CREAT SERPL-MCNC: 11.79 MG/DL — HIGH (ref 0.5–1.3)
DIFF PNL FLD: ABNORMAL
EGFR: 4 ML/MIN/1.73M2 — LOW
EOSINOPHIL # BLD AUTO: 0.12 K/UL — SIGNIFICANT CHANGE UP (ref 0–0.5)
EOSINOPHIL # BLD AUTO: 0.35 K/UL — SIGNIFICANT CHANGE UP (ref 0–0.5)
EOSINOPHIL NFR BLD AUTO: 1.7 % — SIGNIFICANT CHANGE UP (ref 0–6)
EOSINOPHIL NFR BLD AUTO: 4.3 % — SIGNIFICANT CHANGE UP (ref 0–6)
ESTIMATED AVERAGE GLUCOSE: 309 MG/DL — HIGH (ref 68–114)
ESTIMATED AVERAGE GLUCOSE: 321 MG/DL — HIGH (ref 68–114)
FINE GRAN CASTS #/AREA URNS AUTO: PRESENT
FOLATE SERPL-MCNC: >20 NG/ML — SIGNIFICANT CHANGE UP
GAS PNL BLDV: SIGNIFICANT CHANGE UP
GLUCOSE BLDC GLUCOMTR-MCNC: 111 MG/DL — HIGH (ref 70–99)
GLUCOSE BLDC GLUCOMTR-MCNC: 116 MG/DL — HIGH (ref 70–99)
GLUCOSE BLDC GLUCOMTR-MCNC: 118 MG/DL — HIGH (ref 70–99)
GLUCOSE BLDC GLUCOMTR-MCNC: 124 MG/DL — HIGH (ref 70–99)
GLUCOSE BLDC GLUCOMTR-MCNC: 127 MG/DL — HIGH (ref 70–99)
GLUCOSE BLDC GLUCOMTR-MCNC: 145 MG/DL — HIGH (ref 70–99)
GLUCOSE BLDC GLUCOMTR-MCNC: 151 MG/DL — HIGH (ref 70–99)
GLUCOSE BLDC GLUCOMTR-MCNC: 315 MG/DL — HIGH (ref 70–99)
GLUCOSE BLDC GLUCOMTR-MCNC: 42 MG/DL — CRITICAL LOW (ref 70–99)
GLUCOSE BLDC GLUCOMTR-MCNC: 43 MG/DL — CRITICAL LOW (ref 70–99)
GLUCOSE BLDC GLUCOMTR-MCNC: 51 MG/DL — CRITICAL LOW (ref 70–99)
GLUCOSE BLDC GLUCOMTR-MCNC: 58 MG/DL — LOW (ref 70–99)
GLUCOSE BLDC GLUCOMTR-MCNC: 68 MG/DL — LOW (ref 70–99)
GLUCOSE BLDC GLUCOMTR-MCNC: 72 MG/DL — SIGNIFICANT CHANGE UP (ref 70–99)
GLUCOSE BLDC GLUCOMTR-MCNC: 95 MG/DL — SIGNIFICANT CHANGE UP (ref 70–99)
GLUCOSE SERPL-MCNC: 332 MG/DL — HIGH (ref 70–99)
GLUCOSE SERPL-MCNC: 89 MG/DL — SIGNIFICANT CHANGE UP (ref 70–99)
GLUCOSE UR QL: 500 MG/DL
HBV SURFACE AG SER-ACNC: SIGNIFICANT CHANGE UP
HCT VFR BLD CALC: 33.9 % — LOW (ref 39–50)
HCT VFR BLD CALC: 37.8 % — LOW (ref 39–50)
HGB BLD-MCNC: 10.7 G/DL — LOW (ref 13–17)
HGB BLD-MCNC: 11.5 G/DL — LOW (ref 13–17)
IMM GRANULOCYTES NFR BLD AUTO: 0.3 % — SIGNIFICANT CHANGE UP (ref 0–0.9)
IMM GRANULOCYTES NFR BLD AUTO: 0.5 % — SIGNIFICANT CHANGE UP (ref 0–0.9)
KETONES UR QL: ABNORMAL MG/DL
LACTATE BLDV-MCNC: 1.4 MMOL/L — SIGNIFICANT CHANGE UP (ref 0.5–2)
LEUKOCYTE ESTERASE UR-ACNC: NEGATIVE — SIGNIFICANT CHANGE UP
LYMPHOCYTES # BLD AUTO: 1 K/UL — SIGNIFICANT CHANGE UP (ref 1–3.3)
LYMPHOCYTES # BLD AUTO: 1.03 K/UL — SIGNIFICANT CHANGE UP (ref 1–3.3)
LYMPHOCYTES # BLD AUTO: 12.2 % — LOW (ref 13–44)
LYMPHOCYTES # BLD AUTO: 14.6 % — SIGNIFICANT CHANGE UP (ref 13–44)
MAGNESIUM SERPL-MCNC: 2.4 MG/DL — SIGNIFICANT CHANGE UP (ref 1.6–2.6)
MAGNESIUM SERPL-MCNC: 2.7 MG/DL — HIGH (ref 1.6–2.6)
MCHC RBC-ENTMCNC: 29.3 PG — SIGNIFICANT CHANGE UP (ref 27–34)
MCHC RBC-ENTMCNC: 29.4 PG — SIGNIFICANT CHANGE UP (ref 27–34)
MCHC RBC-ENTMCNC: 30.4 G/DL — LOW (ref 32–36)
MCHC RBC-ENTMCNC: 31.6 G/DL — LOW (ref 32–36)
MCV RBC AUTO: 93.1 FL — SIGNIFICANT CHANGE UP (ref 80–100)
MCV RBC AUTO: 96.2 FL — SIGNIFICANT CHANGE UP (ref 80–100)
MONOCYTES # BLD AUTO: 0.69 K/UL — SIGNIFICANT CHANGE UP (ref 0–0.9)
MONOCYTES # BLD AUTO: 0.76 K/UL — SIGNIFICANT CHANGE UP (ref 0–0.9)
MONOCYTES NFR BLD AUTO: 10.8 % — SIGNIFICANT CHANGE UP (ref 2–14)
MONOCYTES NFR BLD AUTO: 8.4 % — SIGNIFICANT CHANGE UP (ref 2–14)
NEUTROPHILS # BLD AUTO: 5.05 K/UL — SIGNIFICANT CHANGE UP (ref 1.8–7.4)
NEUTROPHILS # BLD AUTO: 6.06 K/UL — SIGNIFICANT CHANGE UP (ref 1.8–7.4)
NEUTROPHILS NFR BLD AUTO: 71.7 % — SIGNIFICANT CHANGE UP (ref 43–77)
NEUTROPHILS NFR BLD AUTO: 74 % — SIGNIFICANT CHANGE UP (ref 43–77)
NITRITE UR-MCNC: NEGATIVE — SIGNIFICANT CHANGE UP
NRBC BLD AUTO-RTO: 0 /100 WBCS — SIGNIFICANT CHANGE UP (ref 0–0)
NRBC BLD AUTO-RTO: 0 /100 WBCS — SIGNIFICANT CHANGE UP (ref 0–0)
NT-PROBNP SERPL-SCNC: HIGH PG/ML (ref 0–300)
PH UR: 6 — SIGNIFICANT CHANGE UP (ref 5–8)
PHOSPHATE SERPL-MCNC: 5.5 MG/DL — HIGH (ref 2.5–4.5)
PHOSPHATE SERPL-MCNC: 5.8 MG/DL — HIGH (ref 2.5–4.5)
PLATELET # BLD AUTO: 154 K/UL — SIGNIFICANT CHANGE UP (ref 150–400)
PLATELET # BLD AUTO: 159 K/UL — SIGNIFICANT CHANGE UP (ref 150–400)
POTASSIUM SERPL-MCNC: 3.9 MMOL/L — SIGNIFICANT CHANGE UP (ref 3.5–5.3)
POTASSIUM SERPL-MCNC: 4.4 MMOL/L — SIGNIFICANT CHANGE UP (ref 3.5–5.3)
POTASSIUM SERPL-SCNC: 3.9 MMOL/L — SIGNIFICANT CHANGE UP (ref 3.5–5.3)
POTASSIUM SERPL-SCNC: 4.4 MMOL/L — SIGNIFICANT CHANGE UP (ref 3.5–5.3)
PROCALCITONIN SERPL-MCNC: 2.31 NG/ML — HIGH (ref 0.02–0.1)
PROT SERPL-MCNC: 6.2 G/DL — SIGNIFICANT CHANGE UP (ref 6–8.3)
PROT UR-MCNC: >=1000 MG/DL
RBC # BLD: 3.64 M/UL — LOW (ref 4.2–5.8)
RBC # BLD: 3.93 M/UL — LOW (ref 4.2–5.8)
RBC # FLD: 15.1 % — HIGH (ref 10.3–14.5)
RBC # FLD: 15.4 % — HIGH (ref 10.3–14.5)
RBC CASTS # UR COMP ASSIST: 2 /HPF — SIGNIFICANT CHANGE UP (ref 0–4)
REVIEW: SIGNIFICANT CHANGE UP
SODIUM SERPL-SCNC: 136 MMOL/L — SIGNIFICANT CHANGE UP (ref 135–145)
SODIUM SERPL-SCNC: 139 MMOL/L — SIGNIFICANT CHANGE UP (ref 135–145)
SP GR SPEC: 1.03 — SIGNIFICANT CHANGE UP (ref 1–1.03)
SQUAMOUS # UR AUTO: 1 /HPF — SIGNIFICANT CHANGE UP (ref 0–5)
TROPONIN T, HIGH SENSITIVITY RESULT: 165 NG/L — HIGH (ref 0–51)
TSH SERPL-MCNC: 2.26 UIU/ML — SIGNIFICANT CHANGE UP (ref 0.27–4.2)
UROBILINOGEN FLD QL: 0.2 MG/DL — SIGNIFICANT CHANGE UP (ref 0.2–1)
VIT B12 SERPL-MCNC: 557 PG/ML — SIGNIFICANT CHANGE UP (ref 232–1245)
WBC # BLD: 7.04 K/UL — SIGNIFICANT CHANGE UP (ref 3.8–10.5)
WBC # BLD: 8.19 K/UL — SIGNIFICANT CHANGE UP (ref 3.8–10.5)
WBC # FLD AUTO: 7.04 K/UL — SIGNIFICANT CHANGE UP (ref 3.8–10.5)
WBC # FLD AUTO: 8.19 K/UL — SIGNIFICANT CHANGE UP (ref 3.8–10.5)
WBC UR QL: 1 /HPF — SIGNIFICANT CHANGE UP (ref 0–5)

## 2025-06-10 RX ORDER — DEXTROSE 50 % IN WATER 50 %
25 SYRINGE (ML) INTRAVENOUS ONCE
Refills: 0 | Status: DISCONTINUED | OUTPATIENT
Start: 2025-06-10 | End: 2025-06-13

## 2025-06-10 RX ORDER — PIPERACILLIN-TAZO-DEXTROSE,ISO 3.375G/5
3.38 IV SOLUTION, PIGGYBACK PREMIX FROZEN(ML) INTRAVENOUS EVERY 12 HOURS
Refills: 0 | Status: DISCONTINUED | OUTPATIENT
Start: 2025-06-11 | End: 2025-06-13

## 2025-06-10 RX ORDER — ACETAMINOPHEN 500 MG/5ML
650 LIQUID (ML) ORAL EVERY 6 HOURS
Refills: 0 | Status: DISCONTINUED | OUTPATIENT
Start: 2025-06-10 | End: 2025-06-13

## 2025-06-10 RX ORDER — INSULIN LISPRO 100 U/ML
INJECTION, SOLUTION INTRAVENOUS; SUBCUTANEOUS
Refills: 0 | Status: DISCONTINUED | OUTPATIENT
Start: 2025-06-10 | End: 2025-06-13

## 2025-06-10 RX ORDER — GLUCAGON 3 MG/1
1 POWDER NASAL ONCE
Refills: 0 | Status: DISCONTINUED | OUTPATIENT
Start: 2025-06-10 | End: 2025-06-13

## 2025-06-10 RX ORDER — INSULIN LISPRO 100 U/ML
INJECTION, SOLUTION INTRAVENOUS; SUBCUTANEOUS AT BEDTIME
Refills: 0 | Status: DISCONTINUED | OUTPATIENT
Start: 2025-06-10 | End: 2025-06-12

## 2025-06-10 RX ORDER — HEPARIN SODIUM 1000 [USP'U]/ML
5000 INJECTION INTRAVENOUS; SUBCUTANEOUS EVERY 12 HOURS
Refills: 0 | Status: DISCONTINUED | OUTPATIENT
Start: 2025-06-10 | End: 2025-06-13

## 2025-06-10 RX ORDER — DEXTROSE 50 % IN WATER 50 %
12.5 SYRINGE (ML) INTRAVENOUS ONCE
Refills: 0 | Status: DISCONTINUED | OUTPATIENT
Start: 2025-06-10 | End: 2025-06-13

## 2025-06-10 RX ORDER — DEXTROSE 50 % IN WATER 50 %
12.5 SYRINGE (ML) INTRAVENOUS ONCE
Refills: 0 | Status: COMPLETED | OUTPATIENT
Start: 2025-06-10 | End: 2025-06-10

## 2025-06-10 RX ORDER — DEXTROSE 50 % IN WATER 50 %
50 SYRINGE (ML) INTRAVENOUS ONCE
Refills: 0 | Status: COMPLETED | OUTPATIENT
Start: 2025-06-10 | End: 2025-06-10

## 2025-06-10 RX ORDER — SODIUM CHLORIDE 9 G/1000ML
1000 INJECTION, SOLUTION INTRAVENOUS
Refills: 0 | Status: DISCONTINUED | OUTPATIENT
Start: 2025-06-10 | End: 2025-06-13

## 2025-06-10 RX ORDER — ACETAMINOPHEN 500 MG/5ML
1000 LIQUID (ML) ORAL ONCE
Refills: 0 | Status: COMPLETED | OUTPATIENT
Start: 2025-06-10 | End: 2025-06-10

## 2025-06-10 RX ORDER — DEXTROSE 50 % IN WATER 50 %
15 SYRINGE (ML) INTRAVENOUS ONCE
Refills: 0 | Status: DISCONTINUED | OUTPATIENT
Start: 2025-06-10 | End: 2025-06-13

## 2025-06-10 RX ORDER — PIPERACILLIN-TAZO-DEXTROSE,ISO 3.375G/5
3.38 IV SOLUTION, PIGGYBACK PREMIX FROZEN(ML) INTRAVENOUS ONCE
Refills: 0 | Status: COMPLETED | OUTPATIENT
Start: 2025-06-10 | End: 2025-06-10

## 2025-06-10 RX ADMIN — Medication 400 MILLIGRAM(S): at 22:19

## 2025-06-10 RX ADMIN — Medication 12.5 GRAM(S): at 22:24

## 2025-06-10 RX ADMIN — Medication 50 MILLILITER(S): at 10:10

## 2025-06-10 RX ADMIN — Medication 200 GRAM(S): at 06:03

## 2025-06-10 RX ADMIN — Medication 1000 MILLIGRAM(S): at 22:34

## 2025-06-10 RX ADMIN — Medication 50 MILLILITER(S): at 16:44

## 2025-06-10 RX ADMIN — HEPARIN SODIUM 5000 UNIT(S): 1000 INJECTION INTRAVENOUS; SUBCUTANEOUS at 21:16

## 2025-06-10 RX ADMIN — Medication 25 GRAM(S): at 13:51

## 2025-06-10 NOTE — PROGRESS NOTE ADULT - ASSESSMENT
66 yo M with a history of CHF, T2DM, ESRD on HD presented today after missing HD today, admitted for AHRF requiring BIPAP iso volume overload 2/2 missed HD and workup of toxic metabolic encephalopathy   64 yo M with a history of CHF, T2DM, ESRD on HD presented today after missing HD today, admitted for AHRF requiring BIPAP iso volume overload 2/2 missed HD and workup of toxic metabolic encephalopathy

## 2025-06-10 NOTE — H&P ADULT - NSHPLABSRESULTS_GEN_ALL_CORE
LABS:                          10.7   8.19  )-----------( 154      ( 10 Juan 2025 01:06 )             33.9     06-10    136  |  96  |  91[H]  ----------------------------<  332[H]  3.9   |  19[L]  |  11.47[H]    Ca    7.9[L]      10 Juan 2025 01:06  Phos  5.5     06-10  Mg     2.4     06-10    TPro  6.2  /  Alb  3.6  /  TBili  0.3  /  DBili  x   /  AST  18  /  ALT  23  /  AlkPhos  112  06-10    PT/INR - ( 09 Jun 2025 21:59 )   PT: 12.0 sec;   INR: 1.04 ratio         PTT - ( 09 Jun 2025 21:59 )  PTT:29.8 sec

## 2025-06-10 NOTE — H&P ADULT - PROBLEM SELECTOR PLAN 3
Change in mental status in the ED with labs notable for hypoglycemia, improved after 2 amps of D50    Plan:  -monitor FS Q4 hrs  -D5 LR gentle fluids if persistently low ESRD on MWF HD    Plan:  -Nephrology consult in AM for HD setup inpatient

## 2025-06-10 NOTE — CONSULT NOTE ADULT - SUBJECTIVE AND OBJECTIVE BOX
HPI: Mr. Chavez is a 65 year-old man with history of multiple medical issues including type 2 diabetes, congestive heart failure, and end stage renal disease on hemodialysis, who presented yesterday to the [facility] ER with fever, fatigue, and generalized weakness, and after having missed dialysis yesterday.  In the ER, an RRT was called for altered mental status with fingerstick to 42mg/dL; he received 2amp of D50. He undergoes hemodialysis Mondays, Wednesdays, and Fridays at [facility] under the care of my partner Dr. Rip Caro. His access is a left arm AV fistula.          PAST MEDICAL & SURGICAL HISTORY:  DM2  CHF  ESRD-HD  LUE AVF    Allergies  No Known Allergies    SOCIAL HISTORY:  Denies ETOh,Smoking,     FAMILY HISTORY:  No CKD    REVIEW OF SYSTEMS:  CONSTITUTIONAL: (+)fatigue, (+)generalized weakness, (+)fever  EYES/ENT: No visual changes;  No vertigo or throat pain   NECK: No pain or stiffness  RESPIRATORY: No cough, wheezing, hemoptysis; No shortness of breath  CARDIOVASCULAR: No chest pain or palpitations  GASTROINTESTINAL: No abdominal or epigastric pain. No nausea, vomiting, or hematemesis; No diarrhea or constipation. No melena or hematochezia.  GENITOURINARY: No dysuria, frequency or hematuria  NEUROLOGICAL: No numbness or weakness  SKIN: No itching, burning, rashes, or lesions   All other review of systems is negative unless indicated above.    VITAL:  T(C): , Max: 37.8 (06-09-25 @ 21:11)  T(F): , Max: 100.1 (06-09-25 @ 21:53)  HR: 74 (06-10-25 @ 07:16)  BP: 138/90 (06-10-25 @ 07:16)  RR: 22 (06-10-25 @ 07:16)  SpO2: 98% (06-10-25 @ 07:16)    PHYSICAL EXAM:  Constitutional: NAD, Alert  HEENT: NCAT, MMM  Neck: Supple, No JVD  Respiratory: CTA-b/l  Cardiovascular: RRR s1s2, no m/r/g  Gastrointestinal: BS+, soft, NT/ND  Extremities: No peripheral edema b/l  Neurological: no focal deficits; strength grossly intact  Back: no CVAT b/l  Skin: No rashes, no nevi    LABS:                        11.5   7.04  )-----------( 159      ( 10 Juan 2025 07:35 )             37.8     Na(136)/K(3.9)/Cl(96)/HCO3(19)/BUN(91)/Cr(11.47)Glu(332)/Ca(7.9)/Mg(2.4)/PO4(5.5)    06-10 @ 01:06  Na(139)/K(4.9)/Cl(99)/HCO3(20)/BUN(87)/Cr(11.30)Glu(132)/Ca(8.1)/Mg(2.4)/PO4(5.2)    06-09 @ 21:59    IMAGING:  < from: CT Head No Cont (06.10.25 @ 07:04) >  IMPRESSION: No acute intracranial hemorrhage, mass effect, or shift of   the midline structures.  Small age-indeterminate lacunar infarct in the left gangliocapsular junction.  Mild chronic white matter microvascular type changes.    < from: CT Chest No Cont (06.10.25 @ 07:04) >  Small bilateral pleural effusions.  Bilateral lower lobe opacities favored to represent atelectasis, but   consider infectious etiology in the appropriate clinical scenario.  Partially imaged gallbladder with gallstones and possible gallbladder   wall edema; recommend correlation with gallbladder ultrasound to more   accurately characterize.        ASSESSMENT:  (1)Renal - ESRD - HD MWF - missed HD yesterday; therefore due for HD today  (2)ID - febrile illness of unclear etiology - on IV Zosyn  (3)Hypoglycemia    RECOMMEND:  (1)HD today - 3h, 1kg UF as able  (2)Dose new meds for GFR<10/HD    Thank you for involving [facility] in this patient's care.    With warm regards,    Miles Gonzalez MD   [facility]  Office: [phone]  Cell: [phone]                 HPI: Mr. Chavez is a 65 year-old man with history of multiple medical issues including type 2 diabetes, congestive heart failure, and end stage renal disease on hemodialysis, who presented yesterday to the [facility] ER with fever, fatigue, and generalized weakness, and after having missed dialysis yesterday.  In the ER, an RRT was called for altered mental status with fingerstick to 42mg/dL; he received 2amp of D50. He undergoes hemodialysis Mondays, Wednesdays, and Fridays at [facility] under the care of my partner Dr. Rip Caro. His access is a left arm AV fistula.    Unable to obtain history from patient - he is on BIPAP and is not responding to verbal/tactile/mildly noxious stimuli. Called wife - she shares that he was confused but was able to engage in conversation with her when she left the ER a few hours ago.      PAST MEDICAL & SURGICAL HISTORY:  DM2  CHF  ESRD-HD  LUE AVF    Allergies  No Known Allergies    SOCIAL HISTORY:  Denies ETOh,Smoking,     FAMILY HISTORY:  No CKD    REVIEW OF SYSTEMS:  CONSTITUTIONAL: (+)fatigue, (+)generalized weakness, (+)fever  EYES/ENT: No visual changes;  No vertigo or throat pain   NECK: No pain or stiffness  RESPIRATORY: No cough, wheezing, hemoptysis; No shortness of breath  CARDIOVASCULAR: No chest pain or palpitations  GASTROINTESTINAL: No abdominal or epigastric pain. No nausea, vomiting, or hematemesis; No diarrhea or constipation. No melena or hematochezia.  GENITOURINARY: No dysuria, frequency or hematuria  NEUROLOGICAL: No numbness or weakness  SKIN: No itching, burning, rashes, or lesions   All other review of systems is negative unless indicated above.    VITAL:  T(C): , Max: 37.8 (06-09-25 @ 21:11)  T(F): , Max: 100.1 (06-09-25 @ 21:53)  HR: 74 (06-10-25 @ 07:16)  BP: 138/90 (06-10-25 @ 07:16)  RR: 22 (06-10-25 @ 07:16)  SpO2: 98% (06-10-25 @ 07:16)    PHYSICAL EXAM:  Constitutional: on BIPAP, on warming blanket, nonresponsive to verbal/tactile/mildly noxious stimli  HEENT: (+)BIPAP  Neck: Supple, No JVD  Respiratory: CTA-b/l  Cardiovascular: RRR s1s2, no m/r/g  Gastrointestinal: BS+, soft, NT/ND  Extremities: No peripheral edema b/l  Back: no CVAT b/l  Skin: No rashes, no nevi  Access: LUE AVF (+)thrill    LABS:                        11.5   7.04  )-----------( 159      ( 10 Juan 2025 07:35 )             37.8     Na(136)/K(3.9)/Cl(96)/HCO3(19)/BUN(91)/Cr(11.47)Glu(332)/Ca(7.9)/Mg(2.4)/PO4(5.5)    06-10 @ 01:06  Na(139)/K(4.9)/Cl(99)/HCO3(20)/BUN(87)/Cr(11.30)Glu(132)/Ca(8.1)/Mg(2.4)/PO4(5.2)    06-09 @ 21:59    IMAGING:  < from: CT Head No Cont (06.10.25 @ 07:04) >  IMPRESSION: No acute intracranial hemorrhage, mass effect, or shift of   the midline structures.  Small age-indeterminate lacunar infarct in the left gangliocapsular junction.  Mild chronic white matter microvascular type changes.    < from: CT Chest No Cont (06.10.25 @ 07:04) >  Small bilateral pleural effusions.  Bilateral lower lobe opacities favored to represent atelectasis, but   consider infectious etiology in the appropriate clinical scenario.  Partially imaged gallbladder with gallstones and possible gallbladder   wall edema; recommend correlation with gallbladder ultrasound to more   accurately characterize.        ASSESSMENT:  (1)Renal - ESRD - HD MWF - missed HD yesterday; therefore due for HD today  (2)ID - febrile illness of unclear etiology - on IV Zosyn  (3)Hypoglycemia  (4)Pulm - BIPAP-dependent for now    RECOMMEND:  (1)HD today - 3h, 1kg UF as able  (2)Dose new meds for GFR<10/HD  (3)BIPAP management per Pulmonary    Thank you for involving [facility] in this patient's care.    With warm regards,    Miles Gonzalez MD   [facility]  Office: [phone]  Cell: [phone]

## 2025-06-10 NOTE — RAPID RESPONSE TEAM SUMMARY - NSSITUATIONBACKGROUNDRRT_GEN_ALL_CORE
66 yo M PMH HF, T2DM, ESRD on HD who missed HD today presents with respiratory symptoms and fever in ED.     RRT called for AMS. On presentation, patient sluggish but AOx3. BG level checked prior to RRT 42, receiving 2 amps D50. Patient HDS, afebrile, on NRB (weaned off, SpO2 96%). Patient lethargic on exam, with improved responsiveness 5 minutes later (AOx2), able to move all extremities. BG on recheck > 300. Patient likely with underlying metabolic encephalopathy in the setting of uremia BUN 87, worsened by hypoglycemic episode. He did not receive sedating medications in the ED; hypercapnia is also in the differential. Lab work drawn.     Rec:  - follow up VBG r/o hypercapnia   - follow up ongoing sepsis workup   - nephrology re: dialysis

## 2025-06-10 NOTE — PHYSICAL THERAPY INITIAL EVALUATION ADULT - MANUAL MUSCLE TESTING RESULTS, REHAB EVAL
BUE/BLE grossly at least 3/5 throughout; difficult to access due to decreased ability to follow commands and lethargy/grossly assessed due to

## 2025-06-10 NOTE — ED ADULT NURSE REASSESSMENT NOTE - NS ED NURSE REASSESS COMMENT FT1
00:40 - pt noted to have nasal cannula off, one IV removed, diaphoretic, tachypneic, hypoxic to 86%. Placed on 15L NRB mask with improvement to 96%. Pt difficult to rouse.  Glucose found to be 42. Rapid response called at 00:45. 2 amps D50 25mg administered.  Pt became more rousable, opens eyes to calling name, will state name and year. RRT ended at 0107. Still appears lethargic. on 3L O2. Per [facility] MICU consulted. Wife notified of events.

## 2025-06-10 NOTE — CONSULT NOTE ADULT - SUBJECTIVE AND OBJECTIVE BOX
CHIEF COMPLAINT: missed HD, AMS     HPI: 65M MHx HF, T2DM on insulin, ESRD on HD MWF, CAD s/p stents presenting to ED after missing session of HD due to feeling unwell. Reportedly with sxs cough, fever, general malaise and lethargy. RRT called for AMS and hypoxia to mid 80s per prior documentation, pt found to have blood glucose level in 40s and subsequently given amp of D50 x 2 with repeat FS 300s. Per primary team patient did not recover mental status after the RRT. MICU consulted for urgent HD in setting of new AMS. At bedside patient lethargic but easily arousable to voice, minimally communicative only grunting when asked if he could hear. Moved all 4 extremities on command.     PAST MEDICAL & SURGICAL HISTORY:  Prophylactic measure  ESRD on dialysis  Toxic metabolic encephalopathy  Hypoglycemia    Allergies  No Known Allergies  Intolerances      REVIEW OF SYSTEMS:  [X] Unable to assess ROS because arousable but minimal communication     OBJECTIVE:  ICU Vital Signs Last 24 Hrs  T(C): 36.6 (10 Juan 2025 01:00), Max: 37.8 (09 Jun 2025 21:11)  T(F): 97.9 (10 Juan 2025 01:00), Max: 100.1 (09 Jun 2025 21:53)  HR: 60 (10 Juan 2025 02:12) (60 - 84)  BP: 132/63 (10 Juan 2025 02:12) (113/56 - 184/67)  BP(mean): --  ABP: --  ABP(mean): --  RR: 22 (10 Juan 2025 02:12) (18 - 33)  SpO2: 95% (10 Juan 2025 02:12) (91% - 98%)    O2 Parameters below as of 10 Juan 2025 02:12  Patient On (Oxygen Delivery Method): nasal cannula  O2 Flow (L/min): 3      CAPILLARY BLOOD GLUCOSE  POCT Blood Glucose.: 151 mg/dL (10 Juan 2025 02:31)    PHYSICAL EXAM     GENERAL: sleeping, arousable to voice, minimal communication   HEAD:  Atraumatic, Normocephalic  EYES: EOMI, PERRLA, conjunctiva and sclera clear  ENT: Moist mucous membranes  NECK: Supple, No JVD  CHEST/LUNG: scattered rhonchi, use of accessory muscles w/ respiration   HEART: RRR. No M/R/G  ABDOMEN: Soft, nontender, distended   EXTREMITIES:  2+ Peripheral Pulses, brisk capillary refill. No clubbing, cyanosis, or edema  NERVOUS SYSTEM: lethargic but easily arousable to verbal stimuli, opens eyes, moves all 4 extremities on command   MSK: moves all 4 extremities       HOSPITAL MEDICATIONS:  MEDICATIONS  (STANDING):    MEDICATIONS  (PRN):  acetaminophen     Tablet .. 650 milliGRAM(s) Oral every 6 hours PRN Temp greater or equal to 38C (100.4F), Mild Pain (1 - 3)      LABS:                        10.7   8.19  )-----------( 154      ( 10 Juan 2025 01:06 )             33.9     06-10    136  |  96  |  91[H]  ----------------------------<  332[H]  3.9   |  19[L]  |  11.47[H]    Ca    7.9[L]      10 Juan 2025 01:06  Phos  5.5     06-10  Mg     2.4     06-10    TPro  6.2  /  Alb  3.6  /  TBili  0.3  /  DBili  x   /  AST  18  /  ALT  23  /  AlkPhos  112  06-10    PT/INR - ( 09 Jun 2025 21:59 )   PT: 12.0 sec;   INR: 1.04 ratio         PTT - ( 09 Jun 2025 21:59 )  PTT:29.8 sec  Urinalysis Basic - ( 10 Juan 2025 01:06 )    Color: x / Appearance: x / SG: x / pH: x  Gluc: 332 mg/dL / Ketone: x  / Bili: x / Urobili: x   Blood: x / Protein: x / Nitrite: x   Leuk Esterase: x / RBC: x / WBC x   Sq Epi: x / Non Sq Epi: x / Bacteria: x        Venous Blood Gas:  06-10 @ 00:56  7.29/51/83/24/95.0  VBG Lactate: 0.8  Venous Blood Gas:  06-09 @ 21:41  7.35/44/51/24/80.3  VBG Lactate: 1.9

## 2025-06-10 NOTE — PHYSICAL THERAPY INITIAL EVALUATION ADULT - PLANNED THERAPY INTERVENTIONS, PT EVAL
Goal: Patient will negotiate up and down 13 steps with unilateral rail & CG Ax1 within 4 weeks./balance training/bed mobility training/gait training/transfer training

## 2025-06-10 NOTE — CHART NOTE - NSCHARTNOTEFT_GEN_A_CORE
Called  by  RN  that  pt  oxygen  level  was  in  the  mid  80s  and  he  was  not responding  to  tactile  stimuli.  Pt  had  pulled  off  his  oxygen  and  was  difficult  to  aroused.  RRT  was  called  and  he  was  found  to be  clammy  with  a  finger  stick  of  44. Pt  received  2  amp  of  d50  in  the  Rapid.  pt  became  arousable  to  painful  tactile  stimuli.  Pt  was  removed  off the  NRB   that  was  placed  during  the  rapid  and  weaned down  to  2l NC   Pt  on exam  puplils  are  equal  and  responsive  but  very  limb in  appearance  and  is  belly  breathing.    Discussed  finding  with  Dr Jax Hassan and  kait  with  the  plan  to  call MICU  Consult  at  this  time.    A  called was  made  by the  RN  to  spouse  and  I  spoke  to  the  spouse   Alyson  as  well  and  explained  to  her  what  had happen  and  the plan in  place.  She  verbalized  understanding  at  this time.   Pt  is  hemodynamically  stable.  will  continue  to  monitor  at  this time.

## 2025-06-10 NOTE — CONSULT NOTE ADULT - ATTENDING COMMENTS
65M w/ HF, DM, ESRD on HD, CAD. Presents after missing HD. Reportedly was feeling unwell with cough, fever and malaise. RRT called for AMS and hypoxia to mid 80s - at that time FS 40s given D50 x2 with improvement in FS to 300s, but pt did not recover mental status. Repeat labs were not especially different with exception of VBG showing venous pH 7.29. ICU consulted for urgent HD and for AMS.     On evaluation - pt is lethargic, but arousable to verbal and tactile stimuli and follows commands briskly when asked. No focal deficits. Noted to have large abdomen w/ abdominal breathing at times.     - etiology of AMS likely toxic metabolic in setting of hypoglycemia, mildly elevated BUN, possible sepsis  - AMS workup per primary team  - pt is easily arousable, follows commands and no focal deficits  - can trial BiPAP  - suggest full infectious workup and treatment for CAP given low grade fever and possible infiltrate on CXR on my read  - no clear indication for urgent HD and renal agreeing and scheduling HD for the morning  - at this time, pt does not require ICU level of care; please call back with any further questions or if clinical status changes

## 2025-06-10 NOTE — PHYSICAL THERAPY INITIAL EVALUATION ADULT - ADDITIONAL COMMENTS
Pt reports he lives with family in home with 2 flight of stairs inside. Pt reports prior to admission pt independent with all functional mobility including ambulation without cane.

## 2025-06-10 NOTE — H&P ADULT - PROBLEM SELECTOR PLAN 1
Change in mental status in the ED with labs notable for uremia iso missed HD and hypoglycemia. VBG without evidence of hypercapnia or hypoxemia    Plan:  -f/u blood cx for infectious workup  - f/u B12, folate, TSH for reversible etiologies of encephalopathy  -Monitor glucose levels  -Nephrology consult in AM for HD setup inpatient Patient A&Ox3 at baseline, in ED had change in mental status with hypoxemia, labs notable for uremia iso missed HD and hypoglycemia. VBG without evidence of hypercapnia. CXR wnl without evidence of PNA    Plan:  -f/u blood cx, expanded RVP, remaining infectious workup  - f/u B12, folate, TSH for reversible etiologies of encephalopathy  -Monitor glucose levels  -Nephrology consult in AM for HD setup inpatient  - Consider CT head if mental status not improving Patient A&Ox3 at baseline, in ED had change in mental status with hypoxemia, labs notable for uremia iso missed HD and hypoglycemia. VBG without evidence of hypercapnia. CXR wnl without evidence of PNA    Note alternate MRN : [id number]    Plan:  -f/u blood cx, expanded RVP, remaining infectious workup  -Empiric zosyn for now 6/10-  - f/u B12, folate, TSH for reversible etiologies of encephalopathy  - f/u CT head non cont, CT chest non cont  -Monitor glucose levels  -Nephrology consult in AM for HD setup inpatient  - Consider CT head if mental status not improving

## 2025-06-10 NOTE — PROGRESS NOTE ADULT - PROBLEM SELECTOR PLAN 6
Diet: NPO until mental status improves  DVT ppx: heparin subq  Dispo: pending clinical improvement    NOTE ALTERNATE MRN [id number]

## 2025-06-10 NOTE — H&P ADULT - TIME BILLING
Need to interview and examine patient, discuss care with family/ wife, review MICU recommendations, provide counseling, coordinate care, place orders, document, personally review imaging, ekg and review prior medical records. Time listed above was spent outside of teaching.

## 2025-06-10 NOTE — PROGRESS NOTE ADULT - PROBLEM SELECTOR PLAN 4
Poorly controlled DM2, A1c reported >10 and patient with poor adherence per wife. Experienced abrupt change in mental status in the ED with labs notable for hypoglycemia, improved after 2 amps of D50    Plan:  -monitor FS Q4 hrs  -f/u HbA1c  -D5 LR gentle fluids if persistently low  -holding off on insulin orders for now Poorly controlled DM2, A1c reported >10 and patient with poor adherence per wife. Experienced abrupt change in mental status in the ED with labs notable for hypoglycemia, improved after 2 amps of D50    Plan:  -monitor FS Q4 hrs  -f/u HbA1c- 12%  -D5 LR gentle fluids if persistently low  -holding off on insulin orders for now

## 2025-06-10 NOTE — H&P ADULT - PROBLEM SELECTOR PLAN 4
Diet: NPO until mental status improves  DVT ppx: heparin subq  Dispo: pending clinical improvement Poorly controlled DM2, change in mental status in the ED with labs notable for hypoglycemia, improved after 2 amps of D50    Plan:  -monitor FS Q4 hrs  -f/u HbA1c  -D5 LR gentle fluids if persistently low Poorly controlled DM2, A1c reported >10 and patient with poor adherence per wife. Experienced abrupt change in mental status in the ED with labs notable for hypoglycemia, improved after 2 amps of D50    Plan:  -monitor FS Q4 hrs  -f/u HbA1c  -D5 LR gentle fluids if persistently low  -holding off on insulin orders for now

## 2025-06-10 NOTE — PROGRESS NOTE ADULT - PROBLEM SELECTOR PLAN 2
Change in mental status in the ED with labs notable for uremia iso missed HD, and fluid overload    Plan:  -c/w BIPAP  -f/u repeat VBG in AM  -monitor on

## 2025-06-10 NOTE — PROGRESS NOTE ADULT - PROBLEM SELECTOR PLAN 1
Patient A&Ox3 at baseline, in ED had change in mental status with hypoxemia, labs notable for uremia iso missed HD and hypoglycemia. VBG without evidence of hypercapnia. CXR wnl without evidence of PNA    Note alternate MRN : [id number]    Plan:  -f/u blood cx, expanded RVP, remaining infectious workup  -Empiric zosyn for now 6/10-  - f/u B12, folate, TSH for reversible etiologies of encephalopathy  - f/u CT head non cont, CT chest non cont  -Monitor glucose levels  -Nephrology consult in AM for HD setup inpatient  - Consider CT head if mental status not improving

## 2025-06-10 NOTE — PHYSICAL THERAPY INITIAL EVALUATION ADULT - TRANSFER TRAINING, PT EVAL
2.22
GOAL: Pt will perform ALL transfers with CG A x1, w/use of appropriate assistive device as needed, in 4 weeks.

## 2025-06-10 NOTE — H&P ADULT - ASSESSMENT
64 yo M with a history of CHF, T2DM, ESRD on HD presented today after missing HD today, admitted for resuming HD and workup of toxic metabolic encephalopathy  64 yo M with a history of CHF, T2DM, ESRD on HD presented today after missing HD today, admitted for AHRF requiring BIPAP iso volume overload 2/2 missed HD and workup of toxic metabolic encephalopathy

## 2025-06-10 NOTE — PATIENT PROFILE ADULT - VISION (WITH CORRECTIVE LENSES IF THE PATIENT USUALLY WEARS THEM):
left eye vision blurried/Partially impaired: cannot see medication labels or newsprint, but can see obstacles in path, and the surrounding layout; can count fingers at arm's length

## 2025-06-10 NOTE — H&P ADULT - PROBLEM SELECTOR PLAN 2
ESRD on MWF HD    Plan:  -Nephrology consult in AM for HD setup inpatient Change in mental status in the ED with labs notable for uremia iso missed HD, and fluid overload    Plan:  -c/w BIPAP  -f/u repeat VBG in AM  -monitor on

## 2025-06-10 NOTE — H&P ADULT - HISTORY OF PRESENT ILLNESS
64 yo M with a history of CHF, T2DM, ESRD on HD presented today after missing HD today. In the ED, had an RRT called for a change in mental status and presents with respiratory symptoms and fever    Labs notable for glucose 42, receiving 2 amps D50. Patient HDS, afebrile, on NRB now weaned off. Patient had improved responsiveness 5 minutes later (AOx2), able to move all extremities. BG on recheck > 300. Blood cx collected. Patient likely with underlying metabolic encephalopathy in the setting of uremia BUN 87, worsened by hypoglycemic episode.

## 2025-06-10 NOTE — H&P ADULT - NSHPPHYSICALEXAM_GEN_ALL_CORE
VITALS:   T(C): 36.6 (06-10-25 @ 01:00), Max: 37.8 (06-09-25 @ 21:11)  HR: 63 (06-10-25 @ 01:28) (63 - 84)  BP: 113/56 (06-10-25 @ 01:28) (113/56 - 184/67)  RR: 25 (06-10-25 @ 01:28) (18 - 33)  SpO2: 97% (06-10-25 @ 01:28) (91% - 98%)    GENERAL: NAD, lying in bed comfortably  HEAD:  Atraumatic, normocephalic  EYES: EOMI, PERRLA, conjunctiva and sclera clear  ENT: Moist mucous membranes  NECK: Supple, no JVD  HEART: Regular rate and rhythm, no murmurs, rubs, or gallops  LUNGS: Unlabored respirations. mild bibasilar crackles, wheezing, or rhonchi  ABDOMEN: Soft, nontender, mildly distended, +BS  EXTREMITIES: 2+ peripheral pulses bilaterally. No clubbing, cyanosis, or edema  NERVOUS SYSTEM:  A&Ox2, no focal deficits   SKIN: No rashes or lesions

## 2025-06-10 NOTE — PHYSICAL THERAPY INITIAL EVALUATION ADULT - PERTINENT HX OF CURRENT PROBLEM, REHAB EVAL
66 yo M with a history of CHF, T2DM, ESRD on HD presented today after missing HD today. In the ED, had an RRT called for a change in mental status and presents with respiratory symptoms and fever. Labs notable for glucose 42, receiving 2 amps D50. Patient HDS, afebrile, on NRB now weaned off. Patient had improved responsiveness 5 minutes later (AOx2), able to move all extremities. BG on recheck > 300. Blood cx collected. Patient likely with underlying metabolic encephalopathy in the setting of uremia BUN 87, worsened by hypoglycemic episode.

## 2025-06-10 NOTE — H&P ADULT - ATTENDING COMMENTS
I have reviewed the labs and imaging. CXR w/o focal consolidations    65M w/ hx of ESRD on HD M, W, F, CAD s/p 2 stents, IDDM2 on Toujeo, HTN, HLD, gout, BPH p/w acute metabolic encephalopathy from dialysis. As per wife, pt has been having URI symptoms for past 3 days or so. Pt working initially. At baseline tends to sleep a lot after eating. Sent to ER from dialysis for further evaluation for lethargy. Admitted for dialysis. Had RRT with worsened AMS also with hypoxia and hypoglycemia improved with D50. Patient seems to have 2nd MRN [id number]. Wife endorses multiple admissions in past for AMS related to fevers and infection. Also note hx of SDH in 2022 possibly related to fall. Appreciate MICU recommendations. Emergent dialysis not indicated currently. Some degree of medication and life-style non-compliance at baseline.    -Send Full RVP  -Trial BIPAP  -Frequent FS Q4hrs, wife states pt did not get basal insulin today before bringing to hospital  -Note recent up titration dose outpatient although wife states pt declined to increase frequency.  -CTH and CT Chest non-con. Can d/c if mental status improves with BIPAP  -Nephrology consult/ "[employer] in AM  -F/u BCxs, obtain UA?  -Hold home meds as unsafe for PO  -NPO for now  -F/u MICU recommendations  -, frequent vital signs  -Repeat EKG  -F/u urine legionella  -DVT PPx, SCDs for now I have reviewed the labs and imaging. CXR w/o focal consolidations    65M w/ hx of ESRD on HD M, W, F, CAD s/p 2 stents, IDDM2 on Toujeo, HTN, HLD, gout, BPH p/w acute metabolic encephalopathy from dialysis. As per wife, pt has been having URI symptoms for past 3 days or so. Pt working initially. At baseline tends to sleep a lot after eating. Sent to ER from dialysis for further evaluation for lethargy. Admitted for dialysis. Had RRT with worsened AMS also with hypoxia and hypoglycemia improved with D50. Patient seems to have 2nd MRN [id number]. Wife endorses multiple admissions in past for AMS related to fevers and infection. Also note hx of SDH in 2022 possibly related to fall. Appreciate MICU recommendations. Emergent dialysis not indicated currently. Some degree of medication and life-style non-compliance at baseline.    -Send Full RVP  -Trial BIPAP  -Frequent FS Q4hrs, wife states pt did not get basal insulin today before bringing to hospital  -Note recent up titration dose outpatient although wife states pt declined to increase frequency.  -CTH and CT Chest non-con. Can d/c if mental status improves with BIPAP  -Nephrology consult/ [facility] in AM  -F/u BCxs, obtain UA?  -Hold home meds as unsafe for PO  -NPO for now  -F/u MICU recommendations  -, frequent vital signs  -Repeat EKG  -F/u urine legionella  -Considering empiric IV antibiotics  -DVT PPx, SCDs for now

## 2025-06-10 NOTE — PROGRESS NOTE ADULT - SUBJECTIVE AND OBJECTIVE BOX
PROGRESS NOTE:     Patient is a 65y old  Male who presents with a chief complaint of Metabolic encephalopathy (10 Juan 2025 02:51)      SUBJECTIVE / OVERNIGHT EVENTS:    OVERNIGHT: No acute overnight events.      Patient was examined at bedside and feels well this morning. Denies fever, chills, chest pain, SOB, nausea, vomiting. ROS otherwise negative and pt is amenable to current treatment plan.      REVIEW OF SYSTEMS:    CONSTITUTIONAL:  No weakness, fevers, or chills  EYES/ENT:  No visual changes, vertigo, or throat pain   NECK:  No pain or stiffness  RESPIRATORY:  No SOB, cough, wheezing, or hemoptysis  CARDIOVASCULAR:  No chest pain or palpitations  GASTROINTESTINAL:  No abdominal pain, nausea, vomiting, or hematemesis; no diarrhea, constipation, melena, or hematochezia  GENITOURINARY:  No dysuria, polyuria, or hematuria  NEUROLOGICAL:  No numbness or weakness  SKIN:  No itching or rashes      MEDICATIONS  (STANDING):  heparin   Injectable 5000 Unit(s) SubCutaneous every 12 hours  piperacillin/tazobactam IVPB.- 3.375 Gram(s) IV Intermittent once    MEDICATIONS  (PRN):  acetaminophen     Tablet .. 650 milliGRAM(s) Oral every 6 hours PRN Temp greater or equal to 38C (100.4F), Mild Pain (1 - 3)      CAPILLARY BLOOD GLUCOSE      POCT Blood Glucose.: 111 mg/dL (10 Juan 2025 05:57)  POCT Blood Glucose.: 95 mg/dL (10 Juan 2025 04:47)  POCT Blood Glucose.: 118 mg/dL (10 Juan 2025 03:29)  POCT Blood Glucose.: 151 mg/dL (10 Juan 2025 02:31)  POCT Blood Glucose.: 315 mg/dL (10 Juan 2025 00:59)  POCT Blood Glucose.: 43 mg/dL (10 Juan 2025 00:50)  POCT Blood Glucose.: 42 mg/dL (10 Juan 2025 00:49)    I&O's Summary      PHYSICAL EXAM:  Vital Signs Last 24 Hrs  T(C): 35.6 (10 Juan 2025 07:16), Max: 37.8 (09 Jun 2025 21:11)  T(F): 96.1 (10 Juan 2025 07:16), Max: 100.1 (09 Jun 2025 21:53)  HR: 74 (10 Juan 2025 07:16) (60 - 84)  BP: 138/90 (10 Juan 2025 07:16) (113/56 - 184/67)  BP(mean): --  RR: 22 (10 Juan 2025 07:16) (18 - 33)  SpO2: 98% (10 Juan 2025 07:16) (91% - 100%)    Parameters below as of 10 Juan 2025 07:16  Patient On (Oxygen Delivery Method): BiPAP/CPAP        CONSTITUTIONAL: NAD; well-developed  HEENT: PERRL, clear conjunctiva  RESPIRATORY: Normal respiratory effort; lungs are clear to auscultation bilaterally; No crackles/rhonchi/wheezing  CARDIOVASCULAR: Regular rate and rhythm, normal S1 and S2, no murmur/rub/gallop; No lower extremity edema; Peripheral pulses are 2+ bilaterally  ABDOMEN: Nontender to palpation, normoactive bowel sounds, no rebound/guarding; No hepatosplenomegaly  MUSCULOSKELETAL: No clubbing or cyanosis of digits; no joint swelling or tenderness to palpation  EXTREMITY: Lower extremities non-tender to palpation; non-erythematous B/L  NEURO: A&Ox3; no focal deficits   PSYCH: Normal mood; affect appropriate    LABS:                        10.7   8.19  )-----------( 154      ( 10 Juan 2025 01:06 )             33.9     06-10    136  |  96  |  91[H]  ----------------------------<  332[H]  3.9   |  19[L]  |  11.47[H]    Ca    7.9[L]      10 Juan 2025 01:06  Phos  5.5     06-10  Mg     2.4     06-10    TPro  6.2  /  Alb  3.6  /  TBili  0.3  /  DBili  x   /  AST  18  /  ALT  23  /  AlkPhos  112  06-10    PT/INR - ( 09 Jun 2025 21:59 )   PT: 12.0 sec;   INR: 1.04 ratio         PTT - ( 09 Jun 2025 21:59 )  PTT:29.8 sec      Urinalysis Basic - ( 10 Juan 2025 01:06 )    Color: x / Appearance: x / SG: x / pH: x  Gluc: 332 mg/dL / Ketone: x  / Bili: x / Urobili: x   Blood: x / Protein: x / Nitrite: x   Leuk Esterase: x / RBC: x / WBC x   Sq Epi: x / Non Sq Epi: x / Bacteria: x          RADIOLOGY & ADDITIONAL TESTS:    N/A PROGRESS NOTE:     Patient is a 65y old  Male who presents with a chief complaint of Metabolic encephalopathy (10 Juan 2025 02:51)      SUBJECTIVE / OVERNIGHT EVENTS:    OVERNIGHT: No acute overnight events.      Patient was examined at bedside and feels well this morning. Denies fever, chills, chest pain, nausea, vomiting. ROS otherwise negative and pt is amenable to current treatment plan.      REVIEW OF SYSTEMS:    CONSTITUTIONAL:  No weakness, fevers, or chills  EYES/ENT:  No visual changes, vertigo, or throat pain   NECK:  No pain or stiffness  RESPIRATORY:  No SOB, cough, wheezing, or hemoptysis  CARDIOVASCULAR:  No chest pain or palpitations  GASTROINTESTINAL:  No abdominal pain, nausea, vomiting, or hematemesis; no diarrhea, constipation, melena, or hematochezia  GENITOURINARY:  No dysuria, polyuria, or hematuria  NEUROLOGICAL:  No numbness or weakness  SKIN:  No itching or rashes      MEDICATIONS  (STANDING):  heparin   Injectable 5000 Unit(s) SubCutaneous every 12 hours  piperacillin/tazobactam IVPB.- 3.375 Gram(s) IV Intermittent once    MEDICATIONS  (PRN):  acetaminophen     Tablet .. 650 milliGRAM(s) Oral every 6 hours PRN Temp greater or equal to 38C (100.4F), Mild Pain (1 - 3)      CAPILLARY BLOOD GLUCOSE      POCT Blood Glucose.: 111 mg/dL (10 Juan 2025 05:57)  POCT Blood Glucose.: 95 mg/dL (10 Juan 2025 04:47)  POCT Blood Glucose.: 118 mg/dL (10 Juan 2025 03:29)  POCT Blood Glucose.: 151 mg/dL (10 Jaun 2025 02:31)  POCT Blood Glucose.: 315 mg/dL (10 Juan 2025 00:59)  POCT Blood Glucose.: 43 mg/dL (10 Juan 2025 00:50)  POCT Blood Glucose.: 42 mg/dL (10 Juan 2025 00:49)    I&O's Summary      PHYSICAL EXAM:  Vital Signs Last 24 Hrs  T(C): 35.6 (10 Juan 2025 07:16), Max: 37.8 (09 Jun 2025 21:11)  T(F): 96.1 (10 Juan 2025 07:16), Max: 100.1 (09 Jun 2025 21:53)  HR: 74 (10 Juan 2025 07:16) (60 - 84)  BP: 138/90 (10 Juan 2025 07:16) (113/56 - 184/67)  BP(mean): --  RR: 22 (10 Juan 2025 07:16) (18 - 33)  SpO2: 98% (10 Juan 2025 07:16) (91% - 100%)    Parameters below as of 10 Juan 2025 07:16  Patient On (Oxygen Delivery Method): BiPAP/CPAP        CONSTITUTIONAL: NAD; well-developed  HEENT: PERRL, clear conjunctiva  RESPIRATORY: Normal respiratory effort; lungs are clear to auscultation bilaterally; No crackles/rhonchi/wheezing  CARDIOVASCULAR: Regular rate and rhythm, normal S1 and S2, no murmur/rub/gallop; No lower extremity edema; Peripheral pulses are 2+ bilaterally  ABDOMEN: Nontender to palpation, normoactive bowel sounds, no rebound/guarding; No hepatosplenomegaly  MUSCULOSKELETAL: No clubbing or cyanosis of digits; no joint swelling or tenderness to palpation  EXTREMITY: Lower extremities non-tender to palpation; non-erythematous B/L  NEURO: A&Ox3; no focal deficits   PSYCH: Normal mood; affect appropriate    LABS:                        10.7   8.19  )-----------( 154      ( 10 Juan 2025 01:06 )             33.9     06-10    136  |  96  |  91[H]  ----------------------------<  332[H]  3.9   |  19[L]  |  11.47[H]    Ca    7.9[L]      10 Juan 2025 01:06  Phos  5.5     06-10  Mg     2.4     06-10    TPro  6.2  /  Alb  3.6  /  TBili  0.3  /  DBili  x   /  AST  18  /  ALT  23  /  AlkPhos  112  06-10    PT/INR - ( 09 Jun 2025 21:59 )   PT: 12.0 sec;   INR: 1.04 ratio         PTT - ( 09 Jun 2025 21:59 )  PTT:29.8 sec      Urinalysis Basic - ( 10 Juan 2025 01:06 )    Color: x / Appearance: x / SG: x / pH: x  Gluc: 332 mg/dL / Ketone: x  / Bili: x / Urobili: x   Blood: x / Protein: x / Nitrite: x   Leuk Esterase: x / RBC: x / WBC x   Sq Epi: x / Non Sq Epi: x / Bacteria: x          RADIOLOGY & ADDITIONAL TESTS:    N/A

## 2025-06-10 NOTE — H&P ADULT - NSICDXPASTMEDICALHX_GEN_ALL_CORE_FT
PAST MEDICAL HISTORY:  ESRD on dialysis     Hypoglycemia     Prophylactic measure     Toxic metabolic encephalopathy

## 2025-06-10 NOTE — PROGRESS NOTE ADULT - PROBLEM SELECTOR PLAN 5
No medication data available in surescripts and pt unable to provide.     Plan:  -Please obtain med rec from family in AM

## 2025-06-10 NOTE — ED ADULT NURSE REASSESSMENT NOTE - NS ED NURSE REASSESS COMMENT FT1
Report received from [facility] Harry. Patient resting comfortably in bed at this time, returned from CT scan. Pt A&O x4 and has no complaints at this time. Rectal temp 96.1, MD Campbell aware. VS as documented. Pending MICU consult. Bed locked and lowered. Comfort and safety measures maintained.

## 2025-06-10 NOTE — H&P ADULT - PROBLEM SELECTOR PLAN 5
[facility]     Plan:  -Please obtain med rec from family in AM No medication data available in surescripts and pt unable to provide.     Plan:  -Please obtain med rec from family in AM

## 2025-06-10 NOTE — PROGRESS NOTE ADULT - ATTENDING COMMENTS
65M w/ hx of ESRD on HD M, W, F, CAD s/p 2 stents, IDDM2 on Toujeo, HTN, HLD, gout, BPH p/w acute metabolic encephalopathy from dialysis. As per wife, pt has been having URI symptoms for past 3 days or so. Pt working initially. At baseline tends to sleep a lot after eating. Sent to ER from dialysis for further evaluation for lethargy. Admitted for dialysis. Had RRT with worsened AMS also with hypoxia and hypoglycemia improved with D50. Patient seems to have 2nd MRN [id number]. Wife endorses multiple admissions in past for AMS related to fevers and infection. Also note hx of SDH in 2022 possibly related to fall. Appreciate MICU recommendations, MICU rejected, not Emergent dialysis candidate on admission. Some degree of medication and life-style non-compliance at baseline.    #AMS likely toxic metabolic in setting of hypoglycemia, elevated BUN, possible sepsis    CT chest Small bilateral pleural effusions.  Bilateral lower lobe opacities favored to represent atelectasis, but   consider infectious etiology in the appropriate clinical scenario.  Partially imaged gallbladder with gallstones and possible gallbladder   wall edema; recommend correlation with gallbladder ultrasound to more   accurately characterize.    CT head No acute intracranial hemorrhage, mass effect, or shift of   the midline structures.  Small age-indeterminate lacunar infarct in the left gangliocapsular   junction.  Mild chronic white matter microvascular type changes.    - f/u Full RVP  - currently on BIPAP  - continue zosyn   - Frequent FS Q4hrs, hypoglycemia protocol prn  - CTH and CT Chest non-con. Can d/c if mental status improves with BIPAP  - f/u nephro for HD today  - f/u blood cx, urine cx  - check abd US  - complete med recs and resume home meds as appropriate   - monitor cbc, bmp, blood gas, frequent vital signs, neuro checks  - trend troponin. Repeat EKG. check probnp.   - F/u urine legionella  - DVT PPX. fall + aspiration precautions. 65M w/ hx of ESRD on HD M, W, F, CAD s/p 2 stents, IDDM2 on Toujeo, HTN, HLD, gout, BPH p/w acute metabolic encephalopathy from dialysis. As per wife, pt has been having URI symptoms for past 3 days or so. Pt working initially. At baseline tends to sleep a lot after eating. Sent to ER from dialysis for further evaluation for lethargy. Admitted for dialysis. Had RRT with worsened AMS also with hypoxia and hypoglycemia improved with D50. Patient seems to have 2nd MRN [id number]. Wife endorses multiple admissions in past for AMS related to fevers and infection. Also note hx of SDH in 2022 possibly related to fall. Appreciate MICU recommendations, MICU rejected, not Emergent dialysis candidate on admission. Some degree of medication and life-style non-compliance at baseline.    #AMS likely toxic metabolic in setting of hypoglycemia, elevated BUN, possible sepsis    CT chest Small bilateral pleural effusions.  Bilateral lower lobe opacities favored to represent atelectasis, but   consider infectious etiology in the appropriate clinical scenario.  Partially imaged gallbladder with gallstones and possible gallbladder   wall edema; recommend correlation with gallbladder ultrasound to more   accurately characterize.    CT head No acute intracranial hemorrhage, mass effect, or shift of   the midline structures.  Small age-indeterminate lacunar infarct in the left gangliocapsular   junction.  Mild chronic white matter microvascular type changes.    - f/u Full RVP  - currently on BIPAP  - continue zosyn   - Frequent FS Q4hrs, hypoglycemia protocol prn  - CTH and CT Chest non-con. Can d/c if mental status improves with BIPAP  - f/u nephro for HD today  - f/u blood cx, urine cx  - check abd US  - complete med recs and resume home meds as appropriate   - monitor cbc, bmp, blood gas, frequent vital signs, neuro checks  - trend troponin. Repeat EKG. check probnp. check echo  - F/u urine legionella  - DVT PPX. fall + aspiration precautions.

## 2025-06-10 NOTE — PROGRESS NOTE ADULT - PROBLEM SELECTOR PLAN 3
ESRD on MWF HD    Plan:  -Nephrology consult in AM for HD setup inpatient ESRD on MWF HD    Plan:  -Nephrology consult for inpatient HD

## 2025-06-10 NOTE — CONSULT NOTE ADULT - SUBJECTIVE AND OBJECTIVE BOX
Date of Service, 06-10-25 @ 17:53  CHIEF COMPLAINT:Patient is a 65y old  Male who presents with a chief complaint of Metabolic encephalopathy (10 Juan 2025 08:11)      HPI:  66 yo M with a history of CHF, T2DM, ESRD on HD presented today after missing HD today. In the ED, had an RRT called for a change in mental status and presents with respiratory symptoms and fever  Labs notable for glucose 42, receiving 2 amps D50. Patient HDS, afebrile, on NRB now weaned off. Patient had improved responsiveness 5 minutes later (AOx2), able to move all extremities. BG on recheck > 300. Blood cx collected. Patient likely with underlying metabolic encephalopathy in the setting of uremia BUN 87, worsened by hypoglycemic episode.       PAST MEDICAL & SURGICAL HISTORY:  Prophylactic measure  ESRD on dialysis  Toxic metabolic encephalopathy  Hypoglycemia  s/p multiple stent  cardiomyopathy      MEDICATIONS  (STANDING):  dextrose 5%. 1000 milliLiter(s) (50 mL/Hr) IV Continuous <Continuous>  dextrose 5%. 1000 milliLiter(s) (100 mL/Hr) IV Continuous <Continuous>  dextrose 50% Injectable 25 Gram(s) IV Push once  dextrose 50% Injectable 12.5 Gram(s) IV Push once  dextrose 50% Injectable 25 Gram(s) IV Push once  glucagon  Injectable 1 milliGRAM(s) IntraMuscular once  heparin   Injectable 5000 Unit(s) SubCutaneous every 12 hours  insulin lispro (ADMELOG) corrective regimen sliding scale   SubCutaneous three times a day before meals  insulin lispro (ADMELOG) corrective regimen sliding scale   SubCutaneous at bedtime    MEDICATIONS  (PRN):  acetaminophen     Tablet .. 650 milliGRAM(s) Oral every 6 hours PRN Temp greater or equal to 38C (100.4F), Mild Pain (1 - 3)  dextrose Oral Gel 15 Gram(s) Oral once PRN Blood Glucose LESS THAN 70 milliGRAM(s)/deciliter      FAMILY HISTORY:      SOCIAL HISTORY:    [ ] Non-smoker  [ ] Smoker  [ ] Alcohol    Allergies    No Known Allergies    Intolerances    	    REVIEW OF SYSTEMS:  CONSTITUTIONAL: No fever, weight loss, or fatigue  EYES: No eye pain, visual disturbances, or discharge  ENT:  No difficulty hearing, tinnitus, vertigo; No sinus or throat pain  NECK: No pain or stiffness  RESPIRATORY: No cough, wheezing, chills or hemoptysis; No Shortness of Breath  CARDIOVASCULAR: No chest pain, palpitations, passing out, dizziness, or leg swelling  GASTROINTESTINAL: No abdominal or epigastric pain. No nausea, vomiting, or hematemesis; No diarrhea or constipation. No melena or hematochezia.  GENITOURINARY: No dysuria, frequency, hematuria, or incontinence  NEUROLOGICAL: No headaches, memory loss, loss of strength, numbness, or tremors  SKIN: No itching, burning, rashes, or lesions   LYMPH Nodes: No enlarged glands  ENDOCRINE: No heat or cold intolerance; No hair loss  MUSCULOSKELETAL: No joint pain or swelling; No muscle, back, or extremity pain  PSYCHIATRIC: No depression, anxiety, mood swings, or difficulty sleeping  HEME/LYMPH: No easy bruising, or bleeding gums  ALLERGY AND IMMUNOLOGIC: No hives or eczema	    [ ] All others negative	  [ x] Unable to obtain    PHYSICAL EXAM:  T(C): 36.3 (06-10-25 @ 15:52), Max: 37.8 (06-09-25 @ 21:11)  HR: 72 (06-10-25 @ 15:52) (60 - 88)  BP: 136/91 (06-10-25 @ 15:52) (113/56 - 184/67)  RR: 18 (06-10-25 @ 15:52) (18 - 33)  SpO2: 100% (06-10-25 @ 15:52) (91% - 100%)  Wt(kg): --  I&O's Summary      Appearance: Normal	  HEENT:   Normal oral mucosa, PERRL, EOMI	  Lymphatic: No lymphadenopathy  Cardiovascular: Normal S1 S2, No JVD, + murmurs, No edema  Respiratory: rhonchi  Psychiatry: A & O x 2  Gastrointestinal:  Soft, Non-tender, + BS	  Skin: No rashes, No ecchymoses, No cyanosis	  Neurologic: Non-focal  Extremities:  No clubbing, cyanosis or edema  Vascular: Peripheral pulses palpable 2+ bilaterally    TELEMETRY: 	    ECG:  	  RADIOLOGY:  OTHER: 	  	  LABS:	 	    CARDIAC MARKERS:                          11.5   7.04  )-----------( 159      ( 10 Juan 2025 07:35 )             37.8     06-10    139  |  99  |  88[H]  ----------------------------<  89  4.4   |  18[L]  |  11.79[H]    Ca    8.2[L]      10 Juan 2025 07:35  Phos  5.8     06-10  Mg     2.7     06-10    TPro  6.2  /  Alb  3.6  /  TBili  0.3  /  DBili  x   /  AST  18  /  ALT  23  /  AlkPhos  112  06-10    proBNP:   Lipid Profile:   HgA1c:   TSH: Thyroid Stimulating Hormone, Serum: 2.26 uIU/mL (06-10 @ 03:48)    PT/INR - ( 09 Jun 2025 21:59 )   PT: 12.0 sec;   INR: 1.04 ratio         PTT - ( 09 Jun 2025 21:59 )  PTT:29.8 sec    PREVIOUS DIAGNOSTIC TESTING:      < from: 12 Lead ECG (06.09.25 @ 21:18) >  Diagnosis Line NORMAL SINUS RHYTHM  RIGHTWARD AXIS  SEPTAL INFARCT , AGE UNDETERMINED  T WAVE ABNORMALITY, CONSIDER INFERIOR ISCHEMIA  ABNORMAL ECG  NO PREVIOUS ECGS AVAILABLE    < from: CT Head No Cont (06.10.25 @ 07:04) >  IMPRESSION: No acute intracranial hemorrhage, mass effect, or shift of   the midline structures.    Small age-indeterminate lacunar infarct in the left gangliocapsular   junction.    Mild chronic white matter microvascular type changes.      < from: Xray Chest 1 View- PORTABLE-Urgent (06.09.25 @ 22:57) >  FINDINGS:  Perihilar interstitial opacities. Trace effusions  There is no pneumothorax.  The heart is magnified by technique. Coronary stent. CardioMEMS device in   the left hilum.  The visualized osseous structures demonstrate no acute pathology.    IMPRESSION:  Mild pulmonary edema    < end of copied text >

## 2025-06-11 DIAGNOSIS — I10 ESSENTIAL (PRIMARY) HYPERTENSION: ICD-10-CM

## 2025-06-11 DIAGNOSIS — E11.65 TYPE 2 DIABETES MELLITUS WITH HYPERGLYCEMIA: ICD-10-CM

## 2025-06-11 DIAGNOSIS — E78.5 HYPERLIPIDEMIA, UNSPECIFIED: ICD-10-CM

## 2025-06-11 LAB
ACTH SER-ACNC: 32.7 PG/ML — SIGNIFICANT CHANGE UP (ref 7.2–63.3)
ALBUMIN SERPL ELPH-MCNC: 3.4 G/DL — SIGNIFICANT CHANGE UP (ref 3.3–5)
ALP SERPL-CCNC: 83 U/L — SIGNIFICANT CHANGE UP (ref 40–120)
ALT FLD-CCNC: 20 U/L — SIGNIFICANT CHANGE UP (ref 10–45)
ANION GAP SERPL CALC-SCNC: 19 MMOL/L — HIGH (ref 5–17)
AST SERPL-CCNC: 19 U/L — SIGNIFICANT CHANGE UP (ref 10–40)
BILIRUB SERPL-MCNC: 0.4 MG/DL — SIGNIFICANT CHANGE UP (ref 0.2–1.2)
BUN SERPL-MCNC: 50 MG/DL — HIGH (ref 7–23)
C PEPTIDE SERPL-MCNC: 1.3 NG/ML — SIGNIFICANT CHANGE UP (ref 1.1–4.4)
CALCIUM SERPL-MCNC: 7.7 MG/DL — LOW (ref 8.4–10.5)
CHLORIDE SERPL-SCNC: 94 MMOL/L — LOW (ref 96–108)
CO2 SERPL-SCNC: 24 MMOL/L — SIGNIFICANT CHANGE UP (ref 22–31)
CORTIS AM PEAK SERPL-MCNC: 30.2 UG/DL — HIGH (ref 6–18.4)
CREAT SERPL-MCNC: 8.62 MG/DL — HIGH (ref 0.5–1.3)
EGFR: 6 ML/MIN/1.73M2 — LOW
EGFR: 6 ML/MIN/1.73M2 — LOW
GAS PNL BLDV: SIGNIFICANT CHANGE UP
GLUCOSE BLDC GLUCOMTR-MCNC: 114 MG/DL — HIGH (ref 70–99)
GLUCOSE BLDC GLUCOMTR-MCNC: 116 MG/DL — HIGH (ref 70–99)
GLUCOSE BLDC GLUCOMTR-MCNC: 128 MG/DL — HIGH (ref 70–99)
GLUCOSE BLDC GLUCOMTR-MCNC: 130 MG/DL — HIGH (ref 70–99)
GLUCOSE BLDC GLUCOMTR-MCNC: 55 MG/DL — LOW (ref 70–99)
GLUCOSE BLDC GLUCOMTR-MCNC: 61 MG/DL — LOW (ref 70–99)
GLUCOSE BLDC GLUCOMTR-MCNC: 63 MG/DL — LOW (ref 70–99)
GLUCOSE BLDC GLUCOMTR-MCNC: 68 MG/DL — LOW (ref 70–99)
GLUCOSE BLDC GLUCOMTR-MCNC: 77 MG/DL — SIGNIFICANT CHANGE UP (ref 70–99)
GLUCOSE BLDC GLUCOMTR-MCNC: 92 MG/DL — SIGNIFICANT CHANGE UP (ref 70–99)
GLUCOSE SERPL-MCNC: 67 MG/DL — LOW (ref 70–99)
HCT VFR BLD CALC: 33.9 % — LOW (ref 39–50)
HGB BLD-MCNC: 10.6 G/DL — LOW (ref 13–17)
HPIV3 RNA SPEC QL NAA+PROBE: DETECTED
LEGIONELLA AG UR QL: NEGATIVE — SIGNIFICANT CHANGE UP
MAGNESIUM SERPL-MCNC: 2.1 MG/DL — SIGNIFICANT CHANGE UP (ref 1.6–2.6)
MCHC RBC-ENTMCNC: 29.5 PG — SIGNIFICANT CHANGE UP (ref 27–34)
MCHC RBC-ENTMCNC: 31.3 G/DL — LOW (ref 32–36)
MCV RBC AUTO: 94.4 FL — SIGNIFICANT CHANGE UP (ref 80–100)
NRBC BLD AUTO-RTO: 0 /100 WBCS — SIGNIFICANT CHANGE UP (ref 0–0)
PHOSPHATE SERPL-MCNC: 5.3 MG/DL — HIGH (ref 2.5–4.5)
PLATELET # BLD AUTO: 150 K/UL — SIGNIFICANT CHANGE UP (ref 150–400)
POTASSIUM SERPL-MCNC: 3.9 MMOL/L — SIGNIFICANT CHANGE UP (ref 3.5–5.3)
POTASSIUM SERPL-SCNC: 3.9 MMOL/L — SIGNIFICANT CHANGE UP (ref 3.5–5.3)
PROT SERPL-MCNC: 5.8 G/DL — LOW (ref 6–8.3)
RAPID RVP RESULT: DETECTED
RBC # BLD: 3.59 M/UL — LOW (ref 4.2–5.8)
RBC # FLD: 15.5 % — HIGH (ref 10.3–14.5)
S PNEUM AG UR QL: NEGATIVE — SIGNIFICANT CHANGE UP
SARS-COV-2 RNA SPEC QL NAA+PROBE: SIGNIFICANT CHANGE UP
SODIUM SERPL-SCNC: 137 MMOL/L — SIGNIFICANT CHANGE UP (ref 135–145)
WBC # BLD: 8.14 K/UL — SIGNIFICANT CHANGE UP (ref 3.8–10.5)
WBC # FLD AUTO: 8.14 K/UL — SIGNIFICANT CHANGE UP (ref 3.8–10.5)

## 2025-06-11 RX ORDER — MELATONIN 5 MG
3 TABLET ORAL AT BEDTIME
Refills: 0 | Status: DISCONTINUED | OUTPATIENT
Start: 2025-06-11 | End: 2025-06-13

## 2025-06-11 RX ORDER — SERTRALINE 100 MG/1
1 TABLET, FILM COATED ORAL
Refills: 0 | DISCHARGE

## 2025-06-11 RX ORDER — GABAPENTIN 400 MG/1
1 CAPSULE ORAL
Refills: 0 | DISCHARGE

## 2025-06-11 RX ORDER — FOLIC ACID 1 MG/1
1 TABLET ORAL DAILY
Refills: 0 | Status: DISCONTINUED | OUTPATIENT
Start: 2025-06-11 | End: 2025-06-13

## 2025-06-11 RX ORDER — CARVEDILOL 3.12 MG/1
1 TABLET, FILM COATED ORAL
Refills: 0 | DISCHARGE

## 2025-06-11 RX ORDER — SODIUM CHLORIDE 9 G/1000ML
1000 INJECTION, SOLUTION INTRAVENOUS
Refills: 0 | Status: DISCONTINUED | OUTPATIENT
Start: 2025-06-11 | End: 2025-06-12

## 2025-06-11 RX ORDER — DEXTROSE 50 % IN WATER 50 %
12.5 SYRINGE (ML) INTRAVENOUS ONCE
Refills: 0 | Status: COMPLETED | OUTPATIENT
Start: 2025-06-11 | End: 2025-06-11

## 2025-06-11 RX ORDER — ASPIRIN 325 MG
81 TABLET ORAL DAILY
Refills: 0 | Status: DISCONTINUED | OUTPATIENT
Start: 2025-06-11 | End: 2025-06-13

## 2025-06-11 RX ORDER — ATORVASTATIN CALCIUM 80 MG/1
80 TABLET, FILM COATED ORAL AT BEDTIME
Refills: 0 | Status: DISCONTINUED | OUTPATIENT
Start: 2025-06-11 | End: 2025-06-13

## 2025-06-11 RX ORDER — MELATONIN 5 MG
1 TABLET ORAL
Refills: 0 | DISCHARGE

## 2025-06-11 RX ORDER — CARVEDILOL 3.12 MG/1
6.25 TABLET, FILM COATED ORAL EVERY 12 HOURS
Refills: 0 | Status: DISCONTINUED | OUTPATIENT
Start: 2025-06-11 | End: 2025-06-13

## 2025-06-11 RX ORDER — GABAPENTIN 400 MG/1
300 CAPSULE ORAL
Refills: 0 | Status: DISCONTINUED | OUTPATIENT
Start: 2025-06-11 | End: 2025-06-13

## 2025-06-11 RX ORDER — CLOPIDOGREL BISULFATE 75 MG/1
75 TABLET, FILM COATED ORAL DAILY
Refills: 0 | Status: DISCONTINUED | OUTPATIENT
Start: 2025-06-11 | End: 2025-06-13

## 2025-06-11 RX ORDER — CLOPIDOGREL BISULFATE 75 MG/1
1 TABLET, FILM COATED ORAL
Refills: 0 | DISCHARGE

## 2025-06-11 RX ORDER — ATORVASTATIN CALCIUM 80 MG/1
1 TABLET, FILM COATED ORAL
Refills: 0 | DISCHARGE

## 2025-06-11 RX ORDER — SERTRALINE 100 MG/1
25 TABLET, FILM COATED ORAL DAILY
Refills: 0 | Status: DISCONTINUED | OUTPATIENT
Start: 2025-06-11 | End: 2025-06-13

## 2025-06-11 RX ORDER — ACETAMINOPHEN 500 MG/5ML
975 LIQUID (ML) ORAL ONCE
Refills: 0 | Status: COMPLETED | OUTPATIENT
Start: 2025-06-11 | End: 2025-06-11

## 2025-06-11 RX ORDER — ASPIRIN 325 MG
1 TABLET ORAL
Refills: 0 | DISCHARGE

## 2025-06-11 RX ADMIN — ATORVASTATIN CALCIUM 80 MILLIGRAM(S): 80 TABLET, FILM COATED ORAL at 22:40

## 2025-06-11 RX ADMIN — CARVEDILOL 6.25 MILLIGRAM(S): 3.12 TABLET, FILM COATED ORAL at 17:25

## 2025-06-11 RX ADMIN — Medication 12.5 GRAM(S): at 05:59

## 2025-06-11 RX ADMIN — HEPARIN SODIUM 5000 UNIT(S): 1000 INJECTION INTRAVENOUS; SUBCUTANEOUS at 11:21

## 2025-06-11 RX ADMIN — GABAPENTIN 300 MILLIGRAM(S): 400 CAPSULE ORAL at 17:25

## 2025-06-11 RX ADMIN — HEPARIN SODIUM 5000 UNIT(S): 1000 INJECTION INTRAVENOUS; SUBCUTANEOUS at 22:42

## 2025-06-11 RX ADMIN — Medication 25 GRAM(S): at 00:36

## 2025-06-11 RX ADMIN — Medication 25 GRAM(S): at 11:21

## 2025-06-11 RX ADMIN — Medication 12.5 GRAM(S): at 02:09

## 2025-06-11 RX ADMIN — SODIUM CHLORIDE 50 MILLILITER(S): 9 INJECTION, SOLUTION INTRAVENOUS at 03:19

## 2025-06-11 RX ADMIN — Medication 975 MILLIGRAM(S): at 20:14

## 2025-06-11 RX ADMIN — Medication 975 MILLIGRAM(S): at 19:44

## 2025-06-11 RX ADMIN — Medication 3 MILLIGRAM(S): at 22:40

## 2025-06-11 NOTE — PROGRESS NOTE ADULT - ASSESSMENT
ASSESSMENT:  (1)Renal - ESRD - HD MWF - missed HD Monday, s/p HD yesterday  (2)ID - febrile illness of unclear etiology - on IV Zosyn  (3)Hypoglycemia - on D5 IVF  (4)Pulm - s/p BIPAP, now on NC     RECOMMEND:  (1)Next HD tomorrow - 3h, 1kg UF as able  (2)Dose new meds for GFR<10/HD    Addis Bautista DNP  [facility]  [phone]   ASSESSMENT:  (1)Renal - ESRD - HD MWF - missed HD Monday, s/p HD yesterday, will plan for HD TTS this week and revert back to MWF next week  (2)ID - febrile illness of unclear etiology - on IV Zosyn  (3)Hypoglycemia - on D5 IVF  (4)Pulm - s/p BIPAP, now on NC     RECOMMEND:  (1)Next HD tomorrow - 3h, 1kg UF as able  (2)Dose new meds for GFR<10/HD    Addis Bautista DNP  [facility]  [phone]   ASSESSMENT:  (1)Renal - ESRD - HD MWF - missed HD Monday, s/p HD yesterday, will plan for HD TTS this week and revert back to MWF next week  (2)ID - febrile illness of unclear etiology - on IV Zosyn  (3)Hypoglycemia - on D5 IVF  (4)Pulm - s/p BIPAP, now on NC     RECOMMEND:  (1)Next HD tomorrow - 3h, 1kg UF as able  (2)Dose new meds for GFR<10/HD    Addis Bautista DNP  [facility]  [phone]      RENAL ATTENDING NOTE  Patient seen and examined with NP. Agree with assessment and plan as above.    Miles Gonzalez MD  [facility]  [phone]

## 2025-06-11 NOTE — PROGRESS NOTE ADULT - ASSESSMENT
64 yo M with a history of CHF, T2DM, ESRD on HD presented today after missing HD today, admitted for AHRF requiring BIPAP iso volume overload 2/2 missed HD and workup of toxic metabolic encephalopathy   66 yo M with a history of CHF, T2DM, ESRD on HD presented today after missing HD today, admitted for AHRF requiring BIPAP iso volume overload 2/2 missed HD found to have parainfluenza virus, now off bipap course c/b recurrent hypoglycemia

## 2025-06-11 NOTE — PROGRESS NOTE ADULT - SUBJECTIVE AND OBJECTIVE BOX
PROGRESS NOTE:     Patient is a 65y old  Male who presents with a chief complaint of Metabolic encephalopathy (10 Juan 2025 17:52)      SUBJECTIVE / OVERNIGHT EVENTS:    OVERNIGHT: No acute overnight events.      Patient was examined at bedside and feels well this morning. Denies fever, chills, chest pain, SOB, nausea, vomiting. ROS otherwise negative and pt is amenable to current treatment plan.      REVIEW OF SYSTEMS:    CONSTITUTIONAL:  No weakness, fevers, or chills  EYES/ENT:  No visual changes, vertigo, or throat pain   NECK:  No pain or stiffness  RESPIRATORY:  No SOB, cough, wheezing, or hemoptysis  CARDIOVASCULAR:  No chest pain or palpitations  GASTROINTESTINAL:  No abdominal pain, nausea, vomiting, or hematemesis; no diarrhea, constipation, melena, or hematochezia  GENITOURINARY:  No dysuria, polyuria, or hematuria  NEUROLOGICAL:  No numbness or weakness  SKIN:  No itching or rashes      MEDICATIONS  (STANDING):  dextrose 5%. 1000 milliLiter(s) (100 mL/Hr) IV Continuous <Continuous>  dextrose 5%. 1000 milliLiter(s) (50 mL/Hr) IV Continuous <Continuous>  dextrose 5%. 1000 milliLiter(s) (50 mL/Hr) IV Continuous <Continuous>  dextrose 50% Injectable 25 Gram(s) IV Push once  dextrose 50% Injectable 12.5 Gram(s) IV Push once  dextrose 50% Injectable 25 Gram(s) IV Push once  glucagon  Injectable 1 milliGRAM(s) IntraMuscular once  heparin   Injectable 5000 Unit(s) SubCutaneous every 12 hours  insulin lispro (ADMELOG) corrective regimen sliding scale   SubCutaneous three times a day before meals  insulin lispro (ADMELOG) corrective regimen sliding scale   SubCutaneous at bedtime  piperacillin/tazobactam IVPB.. 3.375 Gram(s) IV Intermittent every 12 hours    MEDICATIONS  (PRN):  acetaminophen     Tablet .. 650 milliGRAM(s) Oral every 6 hours PRN Temp greater or equal to 38C (100.4F), Mild Pain (1 - 3)  dextrose Oral Gel 15 Gram(s) Oral once PRN Blood Glucose LESS THAN 70 milliGRAM(s)/deciliter      CAPILLARY BLOOD GLUCOSE      POCT Blood Glucose.: 116 mg/dL (2025 06:16)  POCT Blood Glucose.: 63 mg/dL (2025 05:54)  POCT Blood Glucose.: 68 mg/dL (2025 05:43)  POCT Blood Glucose.: 114 mg/dL (2025 02:25)  POCT Blood Glucose.: 61 mg/dL (2025 02:02)  POCT Blood Glucose.: 55 mg/dL (2025 02:00)  POCT Blood Glucose.: 116 mg/dL (10 Juan 2025 22:47)  POCT Blood Glucose.: 51 mg/dL (10 Juan 2025 22:22)  POCT Blood Glucose.: 58 mg/dL (10 Juan 2025 22:07)  POCT Blood Glucose.: 127 mg/dL (10 Juan 2025 17:15)  POCT Blood Glucose.: 68 mg/dL (10 Juan 2025 16:27)  POCT Blood Glucose.: 124 mg/dL (10 Juan 2025 12:21)  POCT Blood Glucose.: 145 mg/dL (10 Juan 2025 10:19)  POCT Blood Glucose.: 72 mg/dL (10 Juan 2025 09:42)    I&O's Summary    10 Juan 2025 07:01  -  2025 07:00  --------------------------------------------------------  IN: 0 mL / OUT: 300 mL / NET: -300 mL        PHYSICAL EXAM:  Vital Signs Last 24 Hrs  T(C): 37.1 (2025 04:30), Max: 38.3 (10 Juan 2025 20:20)  T(F): 98.8 (2025 04:30), Max: 100.9 (10 Juan 2025 20:20)  HR: 84 (:30) (67 - 101)  BP: 146/76 (2025 04:30) (112/64 - 179/96)  BP(mean): 96 (10 Juan 2025 12:18) (96 - 96)  RR: 18 (:30) (18 - 25)  SpO2: 100% (:30) (95% - 100%)    Parameters below as of :30  Patient On (Oxygen Delivery Method): BiPAP/CPAP        CONSTITUTIONAL: NAD; well-developed  HEENT: PERRL, clear conjunctiva  RESPIRATORY: Normal respiratory effort; lungs are clear to auscultation bilaterally; No crackles/rhonchi/wheezing  CARDIOVASCULAR: Regular rate and rhythm, normal S1 and S2, no murmur/rub/gallop; No lower extremity edema; Peripheral pulses are 2+ bilaterally  ABDOMEN: Nontender to palpation, normoactive bowel sounds, no rebound/guarding; No hepatosplenomegaly  MUSCULOSKELETAL: No clubbing or cyanosis of digits; no joint swelling or tenderness to palpation  EXTREMITY: Lower extremities non-tender to palpation; non-erythematous B/L  NEURO: A&Ox3; no focal deficits   PSYCH: Normal mood; affect appropriate    LABS:                        11.5   7.04  )-----------( 159      ( 10 Juan 2025 07:35 )             37.8     06-10    139  |  99  |  88[H]  ----------------------------<  89  4.4   |  18[L]  |  11.79[H]    Ca    8.2[L]      10 Juan 2025 07:35  Phos  5.8     06-10  Mg     2.7     06-10    TPro  6.2  /  Alb  3.6  /  TBili  0.3  /  DBili  x   /  AST  18  /  ALT  23  /  AlkPhos  112  06-10    PT/INR - ( 2025 21:59 )   PT: 12.0 sec;   INR: 1.04 ratio         PTT - ( 2025 21:59 )  PTT:29.8 sec      Urinalysis Basic - ( 10 Juan 2025 11:42 )    Color: Yellow / Appearance: Cloudy / S.027 / pH: x  Gluc: x / Ketone: x  / Bili: Negative / Urobili: 0.2 mg/dL   Blood: x / Protein: >=1000 mg/dL / Nitrite: Negative   Leuk Esterase: Negative / RBC: 2 /HPF / WBC 1 /HPF   Sq Epi: x / Non Sq Epi: 1 /HPF / Bacteria: Negative /HPF        Culture - Blood (collected 2025 21:40)  Source: Blood Blood-Peripheral  Preliminary Report (2025 02:03):    No growth at 24 hours    Culture - Blood (collected 2025 21:30)  Source: Blood Blood-Peripheral  Preliminary Report (2025 02:03):    No growth at 24 hours        RADIOLOGY & ADDITIONAL TESTS:    N/A PROGRESS NOTE:     Patient is a 65y old  Male who presents with a chief complaint of Metabolic encephalopathy (10 Juan 2025 17:52)      SUBJECTIVE / OVERNIGHT EVENTS:    OVERNIGHT: febrile to 100.9G, received ofirmev      Patient was examined at bedside and feels well this morning. Pt is amenable to current treatment plan.      REVIEW OF SYSTEMS:    CONSTITUTIONAL:  No weakness, +fevers, or chills  EYES/ENT:  No visual changes, vertigo, or throat pain   NECK:  No pain or stiffness  RESPIRATORY:  +SOB, cough, wheezing, or hemoptysis  CARDIOVASCULAR:  No chest pain or palpitations  GASTROINTESTINAL:  No abdominal pain, nausea, vomiting, or hematemesis; no diarrhea, constipation, melena, or hematochezia  GENITOURINARY:  No dysuria, polyuria, or hematuria  NEUROLOGICAL:  No numbness or weakness  SKIN:  No itching or rashes      MEDICATIONS  (STANDING):  dextrose 5%. 1000 milliLiter(s) (100 mL/Hr) IV Continuous <Continuous>  dextrose 5%. 1000 milliLiter(s) (50 mL/Hr) IV Continuous <Continuous>  dextrose 5%. 1000 milliLiter(s) (50 mL/Hr) IV Continuous <Continuous>  dextrose 50% Injectable 25 Gram(s) IV Push once  dextrose 50% Injectable 12.5 Gram(s) IV Push once  dextrose 50% Injectable 25 Gram(s) IV Push once  glucagon  Injectable 1 milliGRAM(s) IntraMuscular once  heparin   Injectable 5000 Unit(s) SubCutaneous every 12 hours  insulin lispro (ADMELOG) corrective regimen sliding scale   SubCutaneous three times a day before meals  insulin lispro (ADMELOG) corrective regimen sliding scale   SubCutaneous at bedtime  piperacillin/tazobactam IVPB.. 3.375 Gram(s) IV Intermittent every 12 hours    MEDICATIONS  (PRN):  acetaminophen     Tablet .. 650 milliGRAM(s) Oral every 6 hours PRN Temp greater or equal to 38C (100.4F), Mild Pain (1 - 3)  dextrose Oral Gel 15 Gram(s) Oral once PRN Blood Glucose LESS THAN 70 milliGRAM(s)/deciliter      CAPILLARY BLOOD GLUCOSE      POCT Blood Glucose.: 116 mg/dL (2025 06:16)  POCT Blood Glucose.: 63 mg/dL (2025 05:54)  POCT Blood Glucose.: 68 mg/dL (2025 05:43)  POCT Blood Glucose.: 114 mg/dL (2025 02:25)  POCT Blood Glucose.: 61 mg/dL (2025 02:02)  POCT Blood Glucose.: 55 mg/dL (2025 02:00)  POCT Blood Glucose.: 116 mg/dL (10 Juan 2025 22:47)  POCT Blood Glucose.: 51 mg/dL (10 Juan 2025 22:22)  POCT Blood Glucose.: 58 mg/dL (10 Juan 2025 22:07)  POCT Blood Glucose.: 127 mg/dL (10 Juan 2025 17:15)  POCT Blood Glucose.: 68 mg/dL (10 Juan 2025 16:27)  POCT Blood Glucose.: 124 mg/dL (10 Juan 2025 12:21)  POCT Blood Glucose.: 145 mg/dL (10 Juan 2025 10:19)  POCT Blood Glucose.: 72 mg/dL (10 Juan 2025 09:42)    I&O's Summary    10 Juan 2025 07:01  -  2025 07:00  --------------------------------------------------------  IN: 0 mL / OUT: 300 mL / NET: -300 mL        PHYSICAL EXAM:  Vital Signs Last 24 Hrs  T(C): 37.1 (2025 04:30), Max: 38.3 (10 Juan 2025 20:20)  T(F): 98.8 (2025 04:30), Max: 100.9 (10 Juan 2025 20:20)  HR: 84 (2025 04:30) (67 - 101)  BP: 146/76 (2025 04:30) (112/64 - 179/96)  BP(mean): 96 (10 Juan 2025 12:18) (96 - 96)  RR: 18 (2025 04:30) (18 - 25)  SpO2: 100% (2025 04:30) (95% - 100%)    Parameters below as of 2025 04:30  Patient On (Oxygen Delivery Method): BiPAP/CPAP        CONSTITUTIONAL: NAD; well-developed  HEENT: PERRL, clear conjunctiva  RESPIRATORY: Normal respiratory effort; lungs are clear to auscultation bilaterally; No crackles/rhonchi/wheezing  CARDIOVASCULAR: Regular rate and rhythm, normal S1 and S2, no murmur/rub/gallop; No lower extremity edema; Peripheral pulses are 2+ bilaterally  ABDOMEN: Nontender to palpation, normoactive bowel sounds, no rebound/guarding; No hepatosplenomegaly  MUSCULOSKELETAL: No clubbing or cyanosis of digits; no joint swelling or tenderness to palpation  EXTREMITY: Lower extremities non-tender to palpation; non-erythematous B/L  NEURO: A&Ox3; no focal deficits   PSYCH: Normal mood; affect appropriate    LABS:                        11.5   7.04  )-----------( 159      ( 10 Juan 2025 07:35 )             37.8     06-10    139  |  99  |  88[H]  ----------------------------<  89  4.4   |  18[L]  |  11.79[H]    Ca    8.2[L]      10 Juan 2025 07:35  Phos  5.8     06-10  Mg     2.7     06-10    TPro  6.2  /  Alb  3.6  /  TBili  0.3  /  DBili  x   /  AST  18  /  ALT  23  /  AlkPhos  112  06-10    PT/INR - ( 2025 21:59 )   PT: 12.0 sec;   INR: 1.04 ratio         PTT - ( 2025 21:59 )  PTT:29.8 sec      Urinalysis Basic - ( 10 Juan 2025 11:42 )    Color: Yellow / Appearance: Cloudy / S.027 / pH: x  Gluc: x / Ketone: x  / Bili: Negative / Urobili: 0.2 mg/dL   Blood: x / Protein: >=1000 mg/dL / Nitrite: Negative   Leuk Esterase: Negative / RBC: 2 /HPF / WBC 1 /HPF   Sq Epi: x / Non Sq Epi: 1 /HPF / Bacteria: Negative /HPF        Culture - Blood (collected 2025 21:40)  Source: Blood Blood-Peripheral  Preliminary Report (2025 02:03):    No growth at 24 hours    Culture - Blood (collected 2025 21:30)  Source: Blood Blood-Peripheral  Preliminary Report (2025 02:03):    No growth at 24 hours        RADIOLOGY & ADDITIONAL TESTS:    N/A

## 2025-06-11 NOTE — PROGRESS NOTE ADULT - PROBLEM SELECTOR PLAN 6
Diet: NPO until mental status improves  DVT ppx: heparin subq  Dispo: pending clinical improvement    NOTE ALTERNATE MRN [id number] Diet: renal diet  DVT ppx: heparin subq  Dispo: pending clinical improvement    NOTE ALTERNATE MRN [id number]

## 2025-06-11 NOTE — PROVIDER CONTACT NOTE (HYPOGLYCEMIA EVENT) - NS PROVIDER CONTACT BACKGROUND-HYPO
Age: 65y    Gender: Male    POCT Blood Glucose:  51 mg/dL (06-10-25 @ 22:22)  58 mg/dL (06-10-25 @ 22:07)  127 mg/dL (06-10-25 @ 17:15)  68 mg/dL (06-10-25 @ 16:27)  124 mg/dL (06-10-25 @ 12:21)  145 mg/dL (06-10-25 @ 10:19)  72 mg/dL (06-10-25 @ 09:42)  111 mg/dL (06-10-25 @ 05:57)      eMAR:  dextrose 50% Injectable   12.5 Gram(s) IV Push (06-10-25 @ 22:24)    dextrose 50% Injectable   50 milliLiter(s) IV Push (06-10-25 @ 10:10)    dextrose 50% Injectable   50 milliLiter(s) IV Push (06-10-25 @ 16:44)    
Age: 65y    Gender: Male    POCT Blood Glucose:  61 mg/dL (06-11-25 @ 02:02)  55 mg/dL (06-11-25 @ 02:00)  116 mg/dL (06-10-25 @ 22:47)  51 mg/dL (06-10-25 @ 22:22)  58 mg/dL (06-10-25 @ 22:07)  127 mg/dL (06-10-25 @ 17:15)  68 mg/dL (06-10-25 @ 16:27)  124 mg/dL (06-10-25 @ 12:21)      eMAR:  dextrose 50% Injectable   12.5 Gram(s) IV Push (06-10-25 @ 22:24)    dextrose 50% Injectable   12.5 Gram(s) IV Push (06-11-25 @ 02:09)    dextrose 50% Injectable   50 milliLiter(s) IV Push (06-10-25 @ 10:10)    dextrose 50% Injectable   50 milliLiter(s) IV Push (06-10-25 @ 16:44)    
Age: 65y    Gender: Male    POCT Blood Glucose:  63 mg/dL (06-11-25 @ 05:54)  68 mg/dL (06-11-25 @ 05:43)  114 mg/dL (06-11-25 @ 02:25)  61 mg/dL (06-11-25 @ 02:02)  55 mg/dL (06-11-25 @ 02:00)  116 mg/dL (06-10-25 @ 22:47)  51 mg/dL (06-10-25 @ 22:22)  58 mg/dL (06-10-25 @ 22:07)      eMAR:dextrose 50% Injectable   12.5 Gram(s) IV Push (06-11-25 @ 05:59)    dextrose 50% Injectable   50 milliLiter(s) IV Push (06-10-25 @ 16:44)    dextrose 50% Injectable   12.5 Gram(s) IV Push (06-10-25 @ 22:24)    dextrose 50% Injectable   12.5 Gram(s) IV Push (06-11-25 @ 02:09)    dextrose 50% Injectable   50 milliLiter(s) IV Push (06-10-25 @ 10:10)

## 2025-06-11 NOTE — PROGRESS NOTE ADULT - PROBLEM SELECTOR PLAN 1
Patient A&Ox3 at baseline, in ED had change in mental status with hypoxemia, labs notable for uremia iso missed HD and hypoglycemia. VBG without evidence of hypercapnia. CXR wnl without evidence of PNA    Note alternate MRN : [id number]    Plan:  -f/u blood cx, expanded RVP, remaining infectious workup  -Empiric zosyn for now 6/10-  - f/u B12, folate, TSH for reversible etiologies of encephalopathy  - f/u CT head non cont, CT chest non cont  -Monitor glucose levels  -Nephrology consult in AM for HD setup inpatient  - Consider CT head if mental status not improving - Change in mental status in the ED with labs notable for uremia iso missed HD, and fluid overload  - RVP pos for parainfluenza virus  - CT chest with lower lobe opacities c/w atelectasis vs infection  - CXR on admission with pulmonary edema    Plan:  - trial off bipap, now NC  - c/w Zosyn  -monitor on

## 2025-06-11 NOTE — PROGRESS NOTE ADULT - PROBLEM SELECTOR PLAN 2
Change in mental status in the ED with labs notable for uremia iso missed HD, and fluid overload    Plan:  -c/w BIPAP  -f/u repeat VBG in AM  -monitor on  Poorly controlled DM2, A1c reported >10 and patient with poor adherence per wife. Experienced abrupt change in mental status in the ED with labs notable for hypoglycemia, improved after 2 amps of D50    Plan:  -monitor FS Q4 hrs  -f/u HbA1c- 12%  -D5 LR gentle fluids if persistently low  - endo c/s

## 2025-06-11 NOTE — ADVANCED PRACTICE NURSE CONSULT - ASSESSMENT
Patient encountered on [address] [facility]. When wound care RN arrived on unit, patient was found lying in a low air loss pressure redistribution support surface style bed. Patient was alert and oriented and gave consent to skin consult. Mr Chavez is able to turn independently and staff assistance x 1 was provided as needed. Once turned, the wound care RN was able to visualize an area of persistent nonblanchable hyperpigmentation over B/L buttocks/sacral skin, area measures approximately 4cm x 4cm x 0cm - cannot rule out initial phases of a deep tissue injury present on admission. Condom catheter in place diverting urine from skin. B/L heels with hyperpigmentation measuring approximately 3cm x 3cm x 0cm- initial phases of a deep tissue injury cannot be ruled out at this time. Once consult was complete, patient was educated regarding the need for routine turning and positioning to prevent pressure injuries and patient was placed in a right side-lying position utilizing pillow positioner assistive devices.

## 2025-06-11 NOTE — PROGRESS NOTE ADULT - PROBLEM SELECTOR PLAN 4
Poorly controlled DM2, A1c reported >10 and patient with poor adherence per wife. Experienced abrupt change in mental status in the ED with labs notable for hypoglycemia, improved after 2 amps of D50    Plan:  -monitor FS Q4 hrs  -f/u HbA1c- 12%  -D5 LR gentle fluids if persistently low  -holding off on insulin orders for now Patient A&Ox3 at baseline, in ED had change in mental status with hypoxemia, labs notable for uremia iso missed HD and hypoglycemia. VBG without evidence of hypercapnia. CXR wnl without evidence of PNA    Note alternate MRN : [id number]    Plan:  -f/u blood cx, expanded RVP, remaining infectious workup  -Empiric zosyn for now 6/10-  - f/u B12, folate, TSH for reversible etiologies of encephalopathy  - f/u CT head non cont, CT chest non cont  -Monitor glucose levels  -Nephrology consult in AM for HD setup inpatient    RESOLVED  - Consider CT head if mental status not improving

## 2025-06-11 NOTE — PROGRESS NOTE ADULT - SUBJECTIVE AND OBJECTIVE BOX
NEPHROLOGY     Patient seen and examined with wife at bedside, pt resting comfortably on 3L NC, febrile last night, reports feeling ok, no new complaints, in no acute distress. He had HD yesterday and removed 1kg.     MEDICATIONS  (STANDING):  allopurinol 100 milliGRAM(s) Oral daily  ascorbic acid 500 milliGRAM(s) Oral daily  aspirin  chewable 81 milliGRAM(s) Oral daily  atorvastatin 80 milliGRAM(s) Oral at bedtime  carvedilol 6.25 milliGRAM(s) Oral every 12 hours  chlorhexidine 2% Cloths 1 Application(s) Topical <User Schedule>  clopidogrel Tablet 75 milliGRAM(s) Oral daily  dextrose 5%. 1000 milliLiter(s) (50 mL/Hr) IV Continuous <Continuous>  dextrose 5%. 1000 milliLiter(s) (100 mL/Hr) IV Continuous <Continuous>  dextrose 5%. 1000 milliLiter(s) (50 mL/Hr) IV Continuous <Continuous>  dextrose 50% Injectable 25 Gram(s) IV Push once  dextrose 50% Injectable 12.5 Gram(s) IV Push once  dextrose 50% Injectable 25 Gram(s) IV Push once  folic acid 1 milliGRAM(s) Oral daily  gabapentin 300 milliGRAM(s) Oral two times a day  glucagon  Injectable 1 milliGRAM(s) IntraMuscular once  heparin   Injectable 5000 Unit(s) SubCutaneous every 12 hours  insulin lispro (ADMELOG) corrective regimen sliding scale   SubCutaneous three times a day before meals  insulin lispro (ADMELOG) corrective regimen sliding scale   SubCutaneous at bedtime  melatonin 3 milliGRAM(s) Oral at bedtime  piperacillin/tazobactam IVPB.. 3.375 Gram(s) IV Intermittent every 12 hours  sertraline 25 milliGRAM(s) Oral daily  valsartan 40 milliGRAM(s) Oral daily    VITALS:  T(C): , Max: 38.3 (06-10-25 @ 20:20)  T(F): , Max: 100.9 (06-10-25 @ 20:20)  HR: 82 (06-11-25 @ 09:54)  BP: 146/76 (06-11-25 @ 04:30)  BP(mean): --  RR: 18 (06-11-25 @ 04:30)  SpO2: 93% (06-11-25 @ 09:54)    I and O's:    06-10 @ 07:01  -  06-11 @ 07:00  --------------------------------------------------------  IN: 0 mL / OUT: 300 mL / NET: -300 mL    06-11 @ 07:01  -  06-11 @ 13:01  --------------------------------------------------------  IN: 0 mL / OUT: 150 mL / NET: -150 mL    Height (cm): 170.2 (06-10 @ 20:20)  Weight (kg): 86.2 (06-10 @ 20:20)  BMI (kg/m2): 29.8 (06-10 @ 20:20)  BSA (m2): 1.98 (06-10 @ 20:20)      PHYSICAL EXAM:  Constitutional: alert, cooperative, nad on NC  Neck: Supple, No JVD  Respiratory: diminished at bases   Cardiovascular: RRR s1s2, no m/r/g  Gastrointestinal: BS+, soft, NT/ND  Extremities: No peripheral edema b/l  Back: no CVAT b/l  Skin: No rashes, no nevi  Access: LUE AVF (+)thrill    LABS:                        10.6   8.14  )-----------( 150      ( 11 Jun 2025 09:08 )             33.9     06-11    137  |  94[L]  |  50[H]  ----------------------------<  67[L]  3.9   |  24  |  8.62[H]    Ca    7.7[L]      11 Jun 2025 09:08  Phos  5.3     06-11  Mg     2.1     06-11    TPro  5.8[L]  /  Alb  3.4  /  TBili  0.4  /  DBili  x   /  AST  19  /  ALT  20  /  AlkPhos  83  06-11    RADIOLOGY & ADDITIONAL STUDIES:    rad< from: US Abdomen Upper Quadrant Right (06.10.25 @ 23:51) >    IMPRESSION:  Cholelithiasis without acute cholecystitis    --- End of Report ---      IGNACIA MAHONEY MD; Resident Radiologist  This document has been electronically signed.  DILLAN RUIZ MD; Attending Radiologist  This document has been electronically signed. Jun 11 2025  9:47AM    < end of copied text >

## 2025-06-11 NOTE — PROGRESS NOTE ADULT - SUBJECTIVE AND OBJECTIVE BOX
Date of Service: 06-11-25 @ 07:26           CARDIOLOGY     PROGRESS  NOTE   ________________________________________________  CHIEF COMPLAINT:Patient is a 65y old  Male who presents with a chief complaint of Metabolic encephalopathy (11 Jun 2025 07:22)  no complain, more alert  	  REVIEW OF SYSTEMS:  CONSTITUTIONAL: No fever, weight loss, or fatigue  EYES: No eye pain, visual disturbances, or discharge  ENT:  No difficulty hearing, tinnitus, vertigo; No sinus or throat pain  NECK: No pain or stiffness  RESPIRATORY: No cough, wheezing, chills or hemoptysis; No Shortness of Breath  CARDIOVASCULAR: No chest pain, palpitations, passing out, dizziness, or leg swelling  GASTROINTESTINAL: No abdominal or epigastric pain. No nausea, vomiting, or hematemesis; No diarrhea or constipation. No melena or hematochezia.  GENITOURINARY: No dysuria, frequency, hematuria, or incontinence  NEUROLOGICAL: No headaches, memory loss, loss of strength, numbness, or tremors  SKIN: No itching, burning, rashes, or lesions   LYMPH Nodes: No enlarged glands  ENDOCRINE: No heat or cold intolerance; No hair loss  MUSCULOSKELETAL: No joint pain or swelling; No muscle, back, or extremity pain  PSYCHIATRIC: No depression, anxiety, mood swings, or difficulty sleeping  HEME/LYMPH: No easy bruising, or bleeding gums  ALLERGY AND IMMUNOLOGIC: No hives or eczema	    [x ] All others negative	  [ ] Unable to obtain    PHYSICAL EXAM:  T(C): 37.1 (06-11-25 @ 04:30), Max: 38.3 (06-10-25 @ 20:20)  HR: 84 (06-11-25 @ 04:30) (67 - 101)  BP: 146/76 (06-11-25 @ 04:30) (112/64 - 179/96)  RR: 18 (06-11-25 @ 04:30) (18 - 25)  SpO2: 100% (06-11-25 @ 04:30) (95% - 100%)  Wt(kg): --  I&O's Summary    10 Juan 2025 07:01  -  11 Jun 2025 07:00  --------------------------------------------------------  IN: 0 mL / OUT: 300 mL / NET: -300 mL        Appearance: Normal	  HEENT:   Normal oral mucosa, PERRL, EOMI	  Lymphatic: No lymphadenopathy  Cardiovascular: Normal S1 S2, No JVD, + murmurs, No edema  Respiratory: rhonchi  Gastrointestinal:  Soft, Non-tender, + BS	  Skin: No rashes, No ecchymoses, No cyanosis	  Extremities: Normal range of motion, No clubbing, cyanosis or edema  Vascular: Peripheral pulses palpable 2+ bilaterally    MEDICATIONS  (STANDING):  dextrose 5%. 1000 milliLiter(s) (100 mL/Hr) IV Continuous <Continuous>  dextrose 5%. 1000 milliLiter(s) (50 mL/Hr) IV Continuous <Continuous>  dextrose 5%. 1000 milliLiter(s) (50 mL/Hr) IV Continuous <Continuous>  dextrose 50% Injectable 25 Gram(s) IV Push once  dextrose 50% Injectable 12.5 Gram(s) IV Push once  dextrose 50% Injectable 25 Gram(s) IV Push once  glucagon  Injectable 1 milliGRAM(s) IntraMuscular once  heparin   Injectable 5000 Unit(s) SubCutaneous every 12 hours  insulin lispro (ADMELOG) corrective regimen sliding scale   SubCutaneous three times a day before meals  insulin lispro (ADMELOG) corrective regimen sliding scale   SubCutaneous at bedtime  piperacillin/tazobactam IVPB.. 3.375 Gram(s) IV Intermittent every 12 hours      TELEMETRY: 	    ECG:  	  RADIOLOGY:  OTHER: 	  	  LABS:	 	    CARDIAC MARKERS:                        11.5   7.04  )-----------( 159      ( 10 Juan 2025 07:35 )             37.8     06-10    139  |  99  |  88[H]  ----------------------------<  89  4.4   |  18[L]  |  11.79[H]    Ca    8.2[L]      10 Juan 2025 07:35  Phos  5.8     06-10  Mg     2.7     06-10    TPro  6.2  /  Alb  3.6  /  TBili  0.3  /  DBili  x   /  AST  18  /  ALT  23  /  AlkPhos  112  06-10    proBNP:   Lipid Profile:   HgA1c:   TSH: Thyroid Stimulating Hormone, Serum: 2.26 uIU/mL (06-10 @ 03:48)    PT/INR - ( 09 Jun 2025 21:59 )   PT: 12.0 sec;   INR: 1.04 ratio         PTT - ( 09 Jun 2025 21:59 )  PTT:29.8 sec      Assessment and plan  ---------------------------  66 yo M with a history of CHF, T2DM, ESRD on HD presented today after missing HD today. In the ED, had an RRT called for a change in mental status and presents with respiratory symptoms and fever  Labs notable for glucose 42, receiving 2 amps D50. Patient HDS, afebrile, on NRB now weaned off. Patient had improved responsiveness 5 minutes later (AOx2), able to move all extremities. BG on recheck > 300. Blood cx collected. Patient likely with underlying metabolic encephalopathy in the setting of uremia BUN 87, worsened by hypoglycemic episode.   pt is well known to me sherron sig cardiac hx s/p multiple stents, esrd on HD , DM with change of mental status  medical record is new should be change to the old pne to retreat all the records  continue all cardiac meds  spoke to the wife  agree with hd with increase fluid withrawal as tolertaed sec to abnormal chest x ray with fluid overload  will review  office records  pt with sig cardiac hx , start on asa daily, need to get meds record from the wife  echo to evaluate EF  dvt prophylaxis, follow blood sugar level closely  [facility] medical record to combine his recordes

## 2025-06-11 NOTE — ADVANCED PRACTICE NURSE CONSULT - REASON FOR CONSULT
Wound care consult initiated by RN to assess patient's skin for a possible deep tissue injury on coccyx present on admission     Reason for Admission: Metabolic encephalopathy  History of Present Illness:   64 yo M with a history of CHF, T2DM, ESRD on HD presented today after missing HD today. In the ED, had an RRT called for a change in mental status and presents with respiratory symptoms and fever    Labs notable for glucose 42, receiving 2 amps D50. Patient HDS, afebrile, on NRB now weaned off. Patient had improved responsiveness 5 minutes later (AOx2), able to move all extremities. BG on recheck > 300. Blood cx collected. Patient likely with underlying metabolic encephalopathy in the setting of uremia BUN 87, worsened by hypoglycemic episode.

## 2025-06-11 NOTE — CONSULT NOTE ADULT - ASSESSMENT
65M MHx HF, T2DM on insulin, ESRD on HD MWF, CAD s/p stents presenting to ED after missing session of HD due to feeling unwell with sxs fever, cough, lethargy. MICU consulted for urgent HD in setting of AMS after RRT for hypoglycemia in 40s and hypoxia.     #AMS   #increased WOB with URI sxs   #hypoglycemic event in setting of uncontrolled T2DM   #ESRD with missed HD session   -multiple reasons for AMS at this time- hypoxia noted per prior documentation, fever in ED with URI sxs prior to arrival to ED, hypoglycemic event in the ED, missed HD session with elevated BUN   -on exam patient is easily arousable to verbal stimuli and moving all 4 extremities although lethargic  -agree with early HD in the morning per nephrology - pt does not meet criteria for urgent HD at this time based on electrolytes   -recommend initiation on BIPAP given tachypnea and use of accessory muscles along with URI sxs   -full infectious workup with BCx, send full RVP, procalcitonin, S pneumo + Legionella Ag, sputum cx if producing   -clarify insulin regimen/history for T2DM - per outpatient documentation pt with recent HbA1C 11.6% intermittently compliant with insulin and with some history of hypoglycemic events  -if patient remains altered from baseline or with new focal neurologic deficits consider CTH to rule out structural/other etiologies   -rest of workup for AMS per primary team     Patient does not require MICU services at this time. Please reconsult as needed.   **THIS NOTE IS INCOMPLETE PENDING ATTENDING ATTESTATION*** 
66 yo M with a history of CHF, T2DM, ESRD on HD presented today after missing HD today. In the ED, had an RRT called for a change in mental status and presents with respiratory symptoms and fever  Labs notable for glucose 42, receiving 2 amps D50. Patient HDS, afebrile, on NRB now weaned off. Patient had improved responsiveness 5 minutes later (AOx2), able to move all extremities. BG on recheck > 300. Blood cx collected. Patient likely with underlying metabolic encephalopathy in the setting of uremia BUN 87, worsened by hypoglycemic episode.   pt is well known to me eith sig cardiac hx s/p multiple stents, esrd on HD , DM with change of mental status  medical record is new should be change to the old pne to retreat all the records  continue all cardiac meds  spoke to the wife  agree with hd with increase fluid withrawal as tolertaed sec to abnormal chest x ray with fluid overload  will revie office records
65-year-old male with history of CHF, type 2 diabetes, end-stage renal disease, presented after missing dialysis, patient had mental status change, had a rapid response and noted to have respiratory symptoms and fever.  Labs notable for hyperglycemia.  Endocrinology consulted for management of poorly controlled type 2 diabetes with hypoglycemia.    1.  Type 2 diabetes  A1C:A1C with Estimated Average Glucose Result: 12.4 % (06-10-25 @ 07:35)  A1C with Estimated Average Glucose Result: 12.8 % (06-10-25 @ 01:06)  Home regimen: Treseiba 24 units and Humalog 12 unit TID with sliding scale   Endocrinologist:Dr Maher   EGFR: eGFR: 6 mL/min/1.73m2 (06-11-25 @ 09:08)  Discussed with patient the importance of Carb consistent diet and exercise as tolerated.    Recommend nutritional consultation  Recommend DM education through RN staff (see physical to RN order)  Discussed glycemic goal of a1c <7% to prevent microvascular complications of diabetes mellitus and reduce the risk of macrovascular complications.   Recommend annual podiatry and ophthalmology follow up.   Glucose goal of 80-180mg/dl while in patient.   Given hypoglycemia, continue with low does sliding scale for now.   Patient tends to require much less insulin in the hospital, likely due to dietary restrictions here vs. liberal diet at home   Recommend LDSS (1:50 above 150mg/dl) and night time low does sliding scale for hyperglycemia corrections    Discharge recommendation/considerations:  Basal bolus  Does to be determined  Pending patient assistance to get Trulicity outpatient     PLAN DISCUSSED WITH PRIMARY TEAM VIA [employer]/PHONE/EMAIL    2.  Hypertension  BP goal <1 30/80  Patient currently on hemodialysis, appreciate recommendation by nephrology team and dialysis team    3.  Hyperlipidemia  LDL goal <70 mg/dL  At home patient is on atorvastatin 80 mg once daily  Resume atorvastatin if no contraindication.    Patient has follow-up appointment with Flavia Oseguera NP on July 24, 2025 and an appointment with me on September 16, 2025    Patient is high risk with high level decision making due to uncontrolled diabetes which places patient at high risk for cardiovascular and cerebrovascular events. Patient with lability of glucose requiring close monitoring and insulin adjustments.

## 2025-06-11 NOTE — ADVANCED PRACTICE NURSE CONSULT - RECOMMEDATIONS
Impression:    B/L buttocks/sacral hyperpigmentation, cannot rule out a deep tissue injury present on admission  B/L heel hyperpigmentation, cannot rule out a deep tissue injury present on admission      Recommendations:    1) turn and position q2 and PRN utilizing offloading assistive devices  2) routine pericare daily and PRN soiling  3) encourage optimal nutrition  4) waffle cushion or pillow on seat when oob to chair  5) B/L LE complete cair air fluidized boots or nilton-lock pillow to offload heels/feet  6) marbella protective barrier cream to B/L buttocks/sacrum daily and PRN soiling  7) incontinence management - consider external urinary catheter to divert urine from skin if incontinent  8) sween 24 moisturizer to dry skin daily     Plan discussed with CHESTER Reyes on unit      For questions/comments regarding the recommendations in this consult, please contact Aretha Kumari via [employer] Teams. Wound care will not actively follow. For new concerns, please enter new consult. Thank you!

## 2025-06-11 NOTE — CONSULT NOTE ADULT - SUBJECTIVE AND OBJECTIVE BOX
HPI:  HPI:  66 yo M with a history of CHF, T2DM, ESRD on HD presented today after missing HD today. In the ED, had an RRT called for a change in mental status and presents with respiratory symptoms and fever    Labs notable for glucose 42, receiving 2 amps D50. Patient HDS, afebrile, on NRB now weaned off. Patient had improved responsiveness 5 minutes later (AOx2), able to move all extremities. BG on recheck > 300. Blood cx collected. Patient likely with underlying metabolic encephalopathy in the setting of uremia BUN 87, worsened by hypoglycemic episode.  (10 Juan 2025 02:15)    Endocrine history.   Patient follows up with me as outpatient.  Diagnosed with Type 2 DM for more than 20+, compcliated by retinopathy, neuropathy and nephropathy and currently on ESRD.  Macrovascular complication included CAD s/p multiple stents.  Home Diabetes Medications: Toujeo 24 units. TAKE CONSISTENTLY.  Humalog 12 units with LDSS 1:50 above 150mg/dl.  Discussed pump in the past but it was too expensive.  Patient assistance program application was given to patient last visit in the office on 6/3/25 for Trulicity.    ?      PAST MEDICAL & SURGICAL HISTORY:  Prophylactic measure  ESRD on dialysis  Toxic metabolic encephalopathy  Hypoglycemia    FAMILY HISTORY:      Social History:    Outpatient Medications:        MEDICATIONS  (STANDING):  dextrose 5%. 1000 milliLiter(s) (100 mL/Hr) IV Continuous <Continuous>  dextrose 5%. 1000 milliLiter(s) (50 mL/Hr) IV Continuous <Continuous>  dextrose 5%. 1000 milliLiter(s) (50 mL/Hr) IV Continuous <Continuous>  dextrose 50% Injectable 25 Gram(s) IV Push once  dextrose 50% Injectable 12.5 Gram(s) IV Push once  dextrose 50% Injectable 25 Gram(s) IV Push once  glucagon  Injectable 1 milliGRAM(s) IntraMuscular once  heparin   Injectable 5000 Unit(s) SubCutaneous every 12 hours  insulin lispro (ADMELOG) corrective regimen sliding scale   SubCutaneous three times a day before meals  insulin lispro (ADMELOG) corrective regimen sliding scale   SubCutaneous at bedtime  piperacillin/tazobactam IVPB.. 3.375 Gram(s) IV Intermittent every 12 hours    MEDICATIONS  (PRN):  acetaminophen     Tablet .. 650 milliGRAM(s) Oral every 6 hours PRN Temp greater or equal to 38C (100.4F), Mild Pain (1 - 3)  dextrose Oral Gel 15 Gram(s) Oral once PRN Blood Glucose LESS THAN 70 milliGRAM(s)/deciliter      Allergies    No Known Allergies    Intolerances        dextrose 50% Injectable   12.5 Gram(s) IV Push (06-11-25 @ 05:59)    dextrose 50% Injectable   50 milliLiter(s) IV Push (06-10-25 @ 16:44)    dextrose 50% Injectable   12.5 Gram(s) IV Push (06-10-25 @ 22:24)    dextrose 50% Injectable   12.5 Gram(s) IV Push (06-11-25 @ 02:09)    dextrose 50% Injectable   50 milliLiter(s) IV Push (06-10-25 @ 10:10)        Review of Systems:  Constitutional: No fever  Eyes: No blurry vision  Neuro: No tremors  HEENT: No pain  Cardiovascular: No chest pain, palpitations  Respiratory: No SOB, no cough  GI: No nausea, vomiting, abdominal pain  : No dysuria  Skin: no rash  Psych: no depression  Endocrine: no polyuria, polydipsia  Hem/lymph: no swelling  Osteoporosis: no fractures    ALL OTHER SYSTEMS REVIEWED AND NEGATIVE    UNABLE TO OBTAIN    PHYSICAL EXAM:  -----------------------------  VITALS: T(C): 37.1 (06-11-25 @ 04:30)  T(F): 98.8 (06-11-25 @ 04:30), Max: 100.9 (06-10-25 @ 20:20)  HR: 84 (06-11-25 @ 04:30) (67 - 101)  BP: 146/76 (06-11-25 @ 04:30) (112/64 - 179/96)  RR:  (18 - 25)  SpO2:  (95% - 100%)  Wt(kg): --  GENERAL: NAD, well-groomed, well-developed  EYES: No proptosis, no lid lag, anicteric  HEENT:  Atraumatic, Normocephalic, moist mucous membranes  THYROID: Normal size, no palpable nodules  RESPIRATORY: Clear to auscultation bilaterally; No rales, rhonchi, wheezing, or rubs  CARDIOVASCULAR: Regular rate and rhythm; No murmurs; no peripheral edema  GI: Soft, nontender, non distended, normal bowel sounds  SKIN: Dry, intact, No rashes or lesions  MUSCULOSKELETAL: Full range of motion, normal strength  NEURO: sensation intact, extraocular movements intact, no tremor, normal reflexes  PSYCH: Alert and oriented x 3, normal affect, normal mood  CUSHING'S SIGNS: no striae    POCT Blood Glucose.: 77 mg/dL (06-11-25 @ 10:04)  POCT Blood Glucose.: 116 mg/dL (06-11-25 @ 06:16)  POCT Blood Glucose.: 63 mg/dL (06-11-25 @ 05:54)  POCT Blood Glucose.: 68 mg/dL (06-11-25 @ 05:43)  POCT Blood Glucose.: 114 mg/dL (06-11-25 @ 02:25)  POCT Blood Glucose.: 61 mg/dL (06-11-25 @ 02:02)  POCT Blood Glucose.: 55 mg/dL (06-11-25 @ 02:00)  POCT Blood Glucose.: 116 mg/dL (06-10-25 @ 22:47)  POCT Blood Glucose.: 51 mg/dL (06-10-25 @ 22:22)  POCT Blood Glucose.: 58 mg/dL (06-10-25 @ 22:07)  POCT Blood Glucose.: 127 mg/dL (06-10-25 @ 17:15)  POCT Blood Glucose.: 68 mg/dL (06-10-25 @ 16:27)  POCT Blood Glucose.: 124 mg/dL (06-10-25 @ 12:21)  POCT Blood Glucose.: 145 mg/dL (06-10-25 @ 10:19)  POCT Blood Glucose.: 72 mg/dL (06-10-25 @ 09:42)  POCT Blood Glucose.: 111 mg/dL (06-10-25 @ 05:57)  POCT Blood Glucose.: 95 mg/dL (06-10-25 @ 04:47)  POCT Blood Glucose.: 118 mg/dL (06-10-25 @ 03:29)  POCT Blood Glucose.: 151 mg/dL (06-10-25 @ 02:31)  POCT Blood Glucose.: 315 mg/dL (06-10-25 @ 00:59)  POCT Blood Glucose.: 43 mg/dL (06-10-25 @ 00:50)  POCT Blood Glucose.: 42 mg/dL (06-10-25 @ 00:49)                            10.6   8.14  )-----------( 150      ( 11 Jun 2025 09:08 )             33.9       06-11    137  |  94[L]  |  50[H]  ----------------------------<  67[L]  3.9   |  24  |  8.62[H]    eGFR: 6[L]    Ca    7.7[L]      06-11  Mg     2.1     06-11  Phos  5.3     06-11    TPro  5.8[L]  /  Alb  3.4  /  TBili  0.4  /  DBili  x   /  AST  19  /  ALT  20  /  AlkPhos  83  06-11      Thyroid Function Tests:  06-10 @ 03:48 TSH 2.26 FreeT4 -- T3 -- Anti TPO -- Anti Thyroglobulin Ab -- TSI --    A1C with Estimated Average Glucose Result: 12.4 % (06-10-25 @ 07:35)  A1C with Estimated Average Glucose Result: 12.8 % (06-10-25 @ 01:06)      Radiology:   ------------------------           HPI:  HPI:  66 yo M with a history of CHF, T2DM, ESRD on HD presented today after missing HD today. In the ED, had an RRT called for a change in mental status and presents with respiratory symptoms and fever    Labs notable for glucose 42, receiving 2 amps D50. Patient HDS, afebrile, on NRB now weaned off. Patient had improved responsiveness 5 minutes later (AOx2), able to move all extremities. BG on recheck > 300. Blood cx collected. Patient likely with underlying metabolic encephalopathy in the setting of uremia BUN 87, worsened by hypoglycemic episode.  (10 Juan 2025 02:15)    Endocrine history.   Patient follows up with me as outpatient.  Diagnosed with Type 2 DM for more than 20+, complicated by retinopathy, neuropathy and nephropathy and currently on ESRD.  Macrovascular complication included CAD s/p multiple stents.  Home Diabetes Medications: Toujeo 24 units.  Humalog 12 units with LDSS 1:50 above 150mg/dl.  Discussed pump in the past but it was too expensive.  Patient assistance program application was given to patient last visit in the office on 6/3/25 for Trulicity.    Patient reports he has poor appetite.   HTN managed by nephrology.       PAST MEDICAL & SURGICAL HISTORY:  Prophylactic measure  ESRD on dialysis  Toxic metabolic encephalopathy  Hypoglycemia    Social History:  No alcohol  No drugs  No smoking    Outpatient Medications:  Home Medications:  allopurinol 100 mg oral tablet: 1 tab(s) orally once a day (11 Jun 2025 12:27)  aspirin 81 mg oral tablet: 1 tab(s) orally once a day (11 Jun 2025 12:33)  atorvastatin 80 mg oral tablet: 1 tab(s) orally once a day (at bedtime) (11 Jun 2025 12:33)  carvedilol 6.25 mg oral tablet: 1 tab(s) orally 2 times a day (11 Jun 2025 12:33)  clopidogrel 75 mg oral tablet: 1 tab(s) orally once a day (11 Jun 2025 12:33)  famotidine 10 mg oral tablet: 1 tab(s) orally once a day (11 Jun 2025 12:34)  folic acid 1 mg oral tablet: 1 tab(s) orally (11 Jun 2025 12:33)  gabapentin 300 mg oral tablet: 1 tab(s) orally 2 times a day (11 Jun 2025 12:33)  Melatonin 3 mg oral tablet: 1 tab(s) orally once a day (at bedtime) (11 Jun 2025 12:33)  sertraline 25 mg oral tablet: 1 tab(s) orally once a day (11 Jun 2025 12:33)  valsartan 40 mg oral tablet: 1 tab(s) orally once a day (11 Jun 2025 12:27)  Vitamin C 500 mg oral capsule: 1 cap(s) orally once a day (11 Jun 2025 12:33)        MEDICATIONS  (STANDING):  dextrose 5%. 1000 milliLiter(s) (100 mL/Hr) IV Continuous <Continuous>  dextrose 5%. 1000 milliLiter(s) (50 mL/Hr) IV Continuous <Continuous>  dextrose 5%. 1000 milliLiter(s) (50 mL/Hr) IV Continuous <Continuous>  dextrose 50% Injectable 25 Gram(s) IV Push once  dextrose 50% Injectable 12.5 Gram(s) IV Push once  dextrose 50% Injectable 25 Gram(s) IV Push once  glucagon  Injectable 1 milliGRAM(s) IntraMuscular once  heparin   Injectable 5000 Unit(s) SubCutaneous every 12 hours  insulin lispro (ADMELOG) corrective regimen sliding scale   SubCutaneous three times a day before meals  insulin lispro (ADMELOG) corrective regimen sliding scale   SubCutaneous at bedtime  piperacillin/tazobactam IVPB.. 3.375 Gram(s) IV Intermittent every 12 hours    MEDICATIONS  (PRN):  acetaminophen     Tablet .. 650 milliGRAM(s) Oral every 6 hours PRN Temp greater or equal to 38C (100.4F), Mild Pain (1 - 3)  dextrose Oral Gel 15 Gram(s) Oral once PRN Blood Glucose LESS THAN 70 milliGRAM(s)/deciliter      Allergies    No Known Allergies    Intolerances        dextrose 50% Injectable   12.5 Gram(s) IV Push (06-11-25 @ 05:59)    dextrose 50% Injectable   50 milliLiter(s) IV Push (06-10-25 @ 16:44)    dextrose 50% Injectable   12.5 Gram(s) IV Push (06-10-25 @ 22:24)    dextrose 50% Injectable   12.5 Gram(s) IV Push (06-11-25 @ 02:09)    dextrose 50% Injectable   50 milliLiter(s) IV Push (06-10-25 @ 10:10)        Review of Systems:  Constitutional: No fever  Eyes: No blurry vision  Neuro: No tremors  HEENT: No pain  Cardiovascular: No chest pain, palpitations  Respiratory: No SOB, no cough  GI: No nausea, vomiting, abdominal pain  : No dysuria  Skin: no rash  Psych: no depression  Endocrine: no polyuria, polydipsia  Hem/lymph: no swelling  Osteoporosis: no fractures    ALL OTHER SYSTEMS REVIEWED AND NEGATIVE    UNABLE TO OBTAIN    PHYSICAL EXAM:  -----------------------------  VITALS: T(C): 37.1 (06-11-25 @ 04:30)  T(F): 98.8 (06-11-25 @ 04:30), Max: 100.9 (06-10-25 @ 20:20)  HR: 84 (06-11-25 @ 04:30) (67 - 101)  BP: 146/76 (06-11-25 @ 04:30) (112/64 - 179/96)  RR:  (18 - 25)  SpO2:  (95% - 100%)  Wt(kg): --  GENERAL: NAD, well-groomed, well-developed  EYES: No proptosis, no lid lag, anicteric  HEENT:  Atraumatic, Normocephalic, moist mucous membranes  THYROID: Normal size, no palpable nodules  RESPIRATORY: Clear to auscultation bilaterally; No rales, rhonchi, wheezing, or rubs  CARDIOVASCULAR: Regular rate and rhythm; No murmurs; no peripheral edema  GI: Soft, nontender, non distended, normal bowel sounds  SKIN: Dry, intact, No rashes or lesions  MUSCULOSKELETAL: Full range of motion, normal strength  NEURO: sensation intact, extraocular movements intact, no tremor, normal reflexes  PSYCH: Alert and oriented x 3, normal affect, normal mood  CUSHING'S SIGNS: no striae    POCT Blood Glucose.: 77 mg/dL (06-11-25 @ 10:04)  POCT Blood Glucose.: 116 mg/dL (06-11-25 @ 06:16)  POCT Blood Glucose.: 63 mg/dL (06-11-25 @ 05:54)  POCT Blood Glucose.: 68 mg/dL (06-11-25 @ 05:43)  POCT Blood Glucose.: 114 mg/dL (06-11-25 @ 02:25)  POCT Blood Glucose.: 61 mg/dL (06-11-25 @ 02:02)  POCT Blood Glucose.: 55 mg/dL (06-11-25 @ 02:00)  POCT Blood Glucose.: 116 mg/dL (06-10-25 @ 22:47)  POCT Blood Glucose.: 51 mg/dL (06-10-25 @ 22:22)  POCT Blood Glucose.: 58 mg/dL (06-10-25 @ 22:07)  POCT Blood Glucose.: 127 mg/dL (06-10-25 @ 17:15)  POCT Blood Glucose.: 68 mg/dL (06-10-25 @ 16:27)  POCT Blood Glucose.: 124 mg/dL (06-10-25 @ 12:21)  POCT Blood Glucose.: 145 mg/dL (06-10-25 @ 10:19)  POCT Blood Glucose.: 72 mg/dL (06-10-25 @ 09:42)  POCT Blood Glucose.: 111 mg/dL (06-10-25 @ 05:57)  POCT Blood Glucose.: 95 mg/dL (06-10-25 @ 04:47)  POCT Blood Glucose.: 118 mg/dL (06-10-25 @ 03:29)  POCT Blood Glucose.: 151 mg/dL (06-10-25 @ 02:31)  POCT Blood Glucose.: 315 mg/dL (06-10-25 @ 00:59)  POCT Blood Glucose.: 43 mg/dL (06-10-25 @ 00:50)  POCT Blood Glucose.: 42 mg/dL (06-10-25 @ 00:49)                            10.6   8.14  )-----------( 150      ( 11 Jun 2025 09:08 )             33.9       06-11    137  |  94[L]  |  50[H]  ----------------------------<  67[L]  3.9   |  24  |  8.62[H]    eGFR: 6[L]    Ca    7.7[L]      06-11  Mg     2.1     06-11  Phos  5.3     06-11    TPro  5.8[L]  /  Alb  3.4  /  TBili  0.4  /  DBili  x   /  AST  19  /  ALT  20  /  AlkPhos  83  06-11      Thyroid Function Tests:  06-10 @ 03:48 TSH 2.26 FreeT4 -- T3 -- Anti TPO -- Anti Thyroglobulin Ab -- TSI --    A1C with Estimated Average Glucose Result: 12.4 % (06-10-25 @ 07:35)  A1C with Estimated Average Glucose Result: 12.8 % (06-10-25 @ 01:06)      Radiology:   ------------------------

## 2025-06-11 NOTE — PROGRESS NOTE ADULT - ATTENDING COMMENTS
65M w/ hx of ESRD on HD M, W, F, CAD s/p 2 stents, IDDM2 on Toujeo, HTN, HLD, gout, BPH p/w acute metabolic encephalopathy from dialysis. As per wife, pt has been having URI symptoms for past 3 days or so. Pt working initially. At baseline tends to sleep a lot after eating. Sent to ER from dialysis for further evaluation for lethargy. Admitted for dialysis. Had RRT with worsened AMS also with hypoxia and hypoglycemia improved with D50. Patient seems to have 2nd MRN [id number]. Wife endorses multiple admissions in past for AMS related to fevers and infection. Also note hx of SDH in 2022 possibly related to fall. Appreciate MICU recommendations, MICU rejected, not Emergent dialysis candidate on admission. Some degree of medication and life-style non-compliance at baseline.    #AMS likely toxic metabolic in setting of hypoglycemia, elevated BUN, possible sepsis    CT chest Small bilateral pleural effusions. Bilateral lower lobe opacities favored to represent atelectasis, but consider infectious etiology in the appropriate clinical scenario. Partially imaged gallbladder with gallstones and possible gallbladder wall edema; recommend correlation with gallbladder ultrasound to more accurately characterize.    CT head No acute intracranial hemorrhage, mass effect, or shift of the midline structures. Small age-indeterminate lacunar infarct in the left gangliocapsular junction. Mild chronic white matter microvascular type changes.    - RVP + parainfl  - trial off bipap  - continue zosyn   - Frequent FS Q4hrs, hypoglycemia protocol prn - persistently hypoglycemic - f/u insulin, c peptide, cortisol  - f/u nephro for HD   - f/u blood cx ngtd, urine cx  - A1C 12.4 > consult endo   - abd US - pending  - tte  - complete med recs and resume home meds as appropriate   - monitor cbc, bmp, blood gas, frequent vital signs, neuro checks  - trend troponin. Repeat EKG. check probnp. check echo  - urine legionella + strep pn ag neg  - DVT PPX. fall + aspiration precautions.

## 2025-06-12 ENCOUNTER — TRANSCRIPTION ENCOUNTER (OUTPATIENT)
Age: 65
End: 2025-06-12

## 2025-06-12 LAB
ALBUMIN SERPL ELPH-MCNC: 2.9 G/DL — LOW (ref 3.3–5)
ALP SERPL-CCNC: 76 U/L — SIGNIFICANT CHANGE UP (ref 40–120)
ALT FLD-CCNC: 19 U/L — SIGNIFICANT CHANGE UP (ref 10–45)
ANION GAP SERPL CALC-SCNC: 16 MMOL/L — SIGNIFICANT CHANGE UP (ref 5–17)
AST SERPL-CCNC: 22 U/L — SIGNIFICANT CHANGE UP (ref 10–40)
BASOPHILS # BLD AUTO: 0.03 K/UL — SIGNIFICANT CHANGE UP (ref 0–0.2)
BASOPHILS NFR BLD AUTO: 0.4 % — SIGNIFICANT CHANGE UP (ref 0–2)
BILIRUB SERPL-MCNC: 0.4 MG/DL — SIGNIFICANT CHANGE UP (ref 0.2–1.2)
BUN SERPL-MCNC: 61 MG/DL — HIGH (ref 7–23)
CALCIUM SERPL-MCNC: 7 MG/DL — LOW (ref 8.4–10.5)
CHLORIDE SERPL-SCNC: 94 MMOL/L — LOW (ref 96–108)
CO2 SERPL-SCNC: 23 MMOL/L — SIGNIFICANT CHANGE UP (ref 22–31)
CORTIS AM PEAK SERPL-MCNC: 18.1 UG/DL — SIGNIFICANT CHANGE UP (ref 6–18.4)
CREAT SERPL-MCNC: 10.47 MG/DL — HIGH (ref 0.5–1.3)
EGFR: 5 ML/MIN/1.73M2 — LOW
EGFR: 5 ML/MIN/1.73M2 — LOW
EOSINOPHIL # BLD AUTO: 0.73 K/UL — HIGH (ref 0–0.5)
EOSINOPHIL NFR BLD AUTO: 8.8 % — HIGH (ref 0–6)
GLUCOSE BLDC GLUCOMTR-MCNC: 104 MG/DL — HIGH (ref 70–99)
GLUCOSE BLDC GLUCOMTR-MCNC: 133 MG/DL — HIGH (ref 70–99)
GLUCOSE BLDC GLUCOMTR-MCNC: 174 MG/DL — HIGH (ref 70–99)
GLUCOSE BLDC GLUCOMTR-MCNC: 184 MG/DL — HIGH (ref 70–99)
GLUCOSE BLDC GLUCOMTR-MCNC: 250 MG/DL — HIGH (ref 70–99)
GLUCOSE SERPL-MCNC: 94 MG/DL — SIGNIFICANT CHANGE UP (ref 70–99)
HCT VFR BLD CALC: 31 % — LOW (ref 39–50)
HGB BLD-MCNC: 10 G/DL — LOW (ref 13–17)
IMM GRANULOCYTES NFR BLD AUTO: 0.5 % — SIGNIFICANT CHANGE UP (ref 0–0.9)
LYMPHOCYTES # BLD AUTO: 1.34 K/UL — SIGNIFICANT CHANGE UP (ref 1–3.3)
LYMPHOCYTES # BLD AUTO: 16.2 % — SIGNIFICANT CHANGE UP (ref 13–44)
MAGNESIUM SERPL-MCNC: 2.2 MG/DL — SIGNIFICANT CHANGE UP (ref 1.6–2.6)
MCHC RBC-ENTMCNC: 29.4 PG — SIGNIFICANT CHANGE UP (ref 27–34)
MCHC RBC-ENTMCNC: 32.3 G/DL — SIGNIFICANT CHANGE UP (ref 32–36)
MCV RBC AUTO: 91.2 FL — SIGNIFICANT CHANGE UP (ref 80–100)
MONOCYTES # BLD AUTO: 1.1 K/UL — HIGH (ref 0–0.9)
MONOCYTES NFR BLD AUTO: 13.3 % — SIGNIFICANT CHANGE UP (ref 2–14)
MRSA PCR RESULT.: SIGNIFICANT CHANGE UP
NEUTROPHILS # BLD AUTO: 5.05 K/UL — SIGNIFICANT CHANGE UP (ref 1.8–7.4)
NEUTROPHILS NFR BLD AUTO: 60.8 % — SIGNIFICANT CHANGE UP (ref 43–77)
NRBC BLD AUTO-RTO: 0 /100 WBCS — SIGNIFICANT CHANGE UP (ref 0–0)
PHOSPHATE SERPL-MCNC: 6.1 MG/DL — HIGH (ref 2.5–4.5)
PLATELET # BLD AUTO: 144 K/UL — LOW (ref 150–400)
POTASSIUM SERPL-MCNC: 4.1 MMOL/L — SIGNIFICANT CHANGE UP (ref 3.5–5.3)
POTASSIUM SERPL-SCNC: 4.1 MMOL/L — SIGNIFICANT CHANGE UP (ref 3.5–5.3)
PROT SERPL-MCNC: 5.5 G/DL — LOW (ref 6–8.3)
RBC # BLD: 3.4 M/UL — LOW (ref 4.2–5.8)
RBC # FLD: 15.5 % — HIGH (ref 10.3–14.5)
S AUREUS DNA NOSE QL NAA+PROBE: SIGNIFICANT CHANGE UP
SODIUM SERPL-SCNC: 133 MMOL/L — LOW (ref 135–145)
WBC # BLD: 8.29 K/UL — SIGNIFICANT CHANGE UP (ref 3.8–10.5)
WBC # FLD AUTO: 8.29 K/UL — SIGNIFICANT CHANGE UP (ref 3.8–10.5)

## 2025-06-12 RX ORDER — BENZONATATE 100 MG
100 CAPSULE ORAL THREE TIMES A DAY
Refills: 0 | Status: DISCONTINUED | OUTPATIENT
Start: 2025-06-12 | End: 2025-06-13

## 2025-06-12 RX ORDER — INSULIN LISPRO 100 U/ML
INJECTION, SOLUTION INTRAVENOUS; SUBCUTANEOUS
Refills: 0 | Status: DISCONTINUED | OUTPATIENT
Start: 2025-06-12 | End: 2025-06-13

## 2025-06-12 RX ORDER — IPRATROPIUM BROMIDE AND ALBUTEROL SULFATE .5; 2.5 MG/3ML; MG/3ML
3 SOLUTION RESPIRATORY (INHALATION) ONCE
Refills: 0 | Status: COMPLETED | OUTPATIENT
Start: 2025-06-12 | End: 2025-06-12

## 2025-06-12 RX ORDER — DEXTROMETHORPHAN HBR, GUAIFENESIN 200 MG/10ML
600 LIQUID ORAL EVERY 12 HOURS
Refills: 0 | Status: DISCONTINUED | OUTPATIENT
Start: 2025-06-12 | End: 2025-06-13

## 2025-06-12 RX ADMIN — Medication 100 MILLIGRAM(S): at 22:34

## 2025-06-12 RX ADMIN — GABAPENTIN 300 MILLIGRAM(S): 400 CAPSULE ORAL at 18:12

## 2025-06-12 RX ADMIN — HEPARIN SODIUM 5000 UNIT(S): 1000 INJECTION INTRAVENOUS; SUBCUTANEOUS at 22:37

## 2025-06-12 RX ADMIN — Medication 100 MILLIGRAM(S): at 13:20

## 2025-06-12 RX ADMIN — SERTRALINE 25 MILLIGRAM(S): 100 TABLET, FILM COATED ORAL at 13:21

## 2025-06-12 RX ADMIN — INSULIN LISPRO 1: 100 INJECTION, SOLUTION INTRAVENOUS; SUBCUTANEOUS at 13:20

## 2025-06-12 RX ADMIN — SODIUM CHLORIDE 50 MILLILITER(S): 9 INJECTION, SOLUTION INTRAVENOUS at 00:31

## 2025-06-12 RX ADMIN — Medication 25 GRAM(S): at 13:19

## 2025-06-12 RX ADMIN — Medication 500 MILLIGRAM(S): at 13:21

## 2025-06-12 RX ADMIN — Medication 81 MILLIGRAM(S): at 13:22

## 2025-06-12 RX ADMIN — Medication 25 GRAM(S): at 00:31

## 2025-06-12 RX ADMIN — DEXTROMETHORPHAN HBR, GUAIFENESIN 600 MILLIGRAM(S): 200 LIQUID ORAL at 18:12

## 2025-06-12 RX ADMIN — CARVEDILOL 6.25 MILLIGRAM(S): 3.12 TABLET, FILM COATED ORAL at 18:14

## 2025-06-12 RX ADMIN — CLOPIDOGREL BISULFATE 75 MILLIGRAM(S): 75 TABLET, FILM COATED ORAL at 13:21

## 2025-06-12 RX ADMIN — GABAPENTIN 300 MILLIGRAM(S): 400 CAPSULE ORAL at 05:22

## 2025-06-12 RX ADMIN — INSULIN LISPRO 1: 100 INJECTION, SOLUTION INTRAVENOUS; SUBCUTANEOUS at 18:13

## 2025-06-12 RX ADMIN — Medication 3 MILLIGRAM(S): at 22:34

## 2025-06-12 RX ADMIN — FOLIC ACID 1 MILLIGRAM(S): 1 TABLET ORAL at 13:22

## 2025-06-12 RX ADMIN — ATORVASTATIN CALCIUM 80 MILLIGRAM(S): 80 TABLET, FILM COATED ORAL at 22:34

## 2025-06-12 RX ADMIN — IPRATROPIUM BROMIDE AND ALBUTEROL SULFATE 3 MILLILITER(S): .5; 2.5 SOLUTION RESPIRATORY (INHALATION) at 18:14

## 2025-06-12 RX ADMIN — Medication 100 MILLIGRAM(S): at 13:21

## 2025-06-12 NOTE — DISCHARGE NOTE PROVIDER - NSDCFUADDAPPT_GEN_ALL_CORE_FT
APPTS ARE READY TO BE MADE: [X] YES    Best Family or Patient Contact (if needed):    Additional Information about above appointments (if needed):    1: PCP for general health maintenance  2: Endocrinology for T2DM  3:     Other comments or requests:

## 2025-06-12 NOTE — DIETITIAN INITIAL EVALUATION ADULT - REASON INDICATOR FOR ASSESSMENT
RD consult warranted for Pressure Injury Stage 2 or >  Source: Patient, Registered Nurse, Electronic Medical Record  Chart reviewed, events noted.

## 2025-06-12 NOTE — DIETITIAN INITIAL EVALUATION ADULT - PROBLEM SELECTOR PLAN 4
Poorly controlled DM2, A1c reported >10 and patient with poor adherence per wife. Experienced abrupt change in mental status in the ED with labs notable for hypoglycemia, improved after 2 amps of D50    Plan:  -monitor FS Q4 hrs  -f/u HbA1c  -D5 LR gentle fluids if persistently low  -holding off on insulin orders for now

## 2025-06-12 NOTE — DIETITIAN INITIAL EVALUATION ADULT - PROBLEM SELECTOR PLAN 6
Diet: NPO until mental status improves  DVT ppx: heparin subq  Dispo: pending clinical improvement    NOTE ALTERNATE MRN [id number]

## 2025-06-12 NOTE — PROGRESS NOTE ADULT - SUBJECTIVE AND OBJECTIVE BOX
PROGRESS NOTE:     Patient is a 65y old  Male who presents with a chief complaint of Metabolic encephalopathy (11 Jun 2025 13:01)      SUBJECTIVE / OVERNIGHT EVENTS:    OVERNIGHT: No acute overnight events.      Patient was examined at bedside and feels well this morning. Denies fever, chills, chest pain, SOB, nausea, vomiting. ROS otherwise negative and pt is amenable to current treatment plan.      REVIEW OF SYSTEMS:    CONSTITUTIONAL:  No weakness, fevers, or chills  EYES/ENT:  No visual changes, vertigo, or throat pain   NECK:  No pain or stiffness  RESPIRATORY:  No SOB, cough, wheezing, or hemoptysis  CARDIOVASCULAR:  No chest pain or palpitations  GASTROINTESTINAL:  No abdominal pain, nausea, vomiting, or hematemesis; no diarrhea, constipation, melena, or hematochezia  GENITOURINARY:  No dysuria, polyuria, or hematuria  NEUROLOGICAL:  No numbness or weakness  SKIN:  No itching or rashes      MEDICATIONS  (STANDING):  allopurinol 100 milliGRAM(s) Oral daily  ascorbic acid 500 milliGRAM(s) Oral daily  aspirin  chewable 81 milliGRAM(s) Oral daily  atorvastatin 80 milliGRAM(s) Oral at bedtime  carvedilol 6.25 milliGRAM(s) Oral every 12 hours  chlorhexidine 2% Cloths 1 Application(s) Topical <User Schedule>  clopidogrel Tablet 75 milliGRAM(s) Oral daily  dextrose 5%. 1000 milliLiter(s) (50 mL/Hr) IV Continuous <Continuous>  dextrose 5%. 1000 milliLiter(s) (100 mL/Hr) IV Continuous <Continuous>  dextrose 5%. 1000 milliLiter(s) (50 mL/Hr) IV Continuous <Continuous>  dextrose 50% Injectable 25 Gram(s) IV Push once  dextrose 50% Injectable 12.5 Gram(s) IV Push once  dextrose 50% Injectable 25 Gram(s) IV Push once  folic acid 1 milliGRAM(s) Oral daily  gabapentin 300 milliGRAM(s) Oral two times a day  glucagon  Injectable 1 milliGRAM(s) IntraMuscular once  heparin   Injectable 5000 Unit(s) SubCutaneous every 12 hours  insulin lispro (ADMELOG) corrective regimen sliding scale   SubCutaneous three times a day before meals  insulin lispro (ADMELOG) corrective regimen sliding scale   SubCutaneous at bedtime  melatonin 3 milliGRAM(s) Oral at bedtime  piperacillin/tazobactam IVPB.. 3.375 Gram(s) IV Intermittent every 12 hours  sertraline 25 milliGRAM(s) Oral daily  valsartan 40 milliGRAM(s) Oral daily    MEDICATIONS  (PRN):  acetaminophen     Tablet .. 650 milliGRAM(s) Oral every 6 hours PRN Temp greater or equal to 38C (100.4F), Mild Pain (1 - 3)  dextrose Oral Gel 15 Gram(s) Oral once PRN Blood Glucose LESS THAN 70 milliGRAM(s)/deciliter      CAPILLARY BLOOD GLUCOSE      POCT Blood Glucose.: 133 mg/dL (12 Jun 2025 05:32)  POCT Blood Glucose.: 128 mg/dL (11 Jun 2025 23:36)  POCT Blood Glucose.: 92 mg/dL (11 Jun 2025 22:10)  POCT Blood Glucose.: 130 mg/dL (11 Jun 2025 17:19)  POCT Blood Glucose.: 77 mg/dL (11 Jun 2025 10:04)    I&O's Summary    11 Jun 2025 07:01  -  12 Jun 2025 07:00  --------------------------------------------------------  IN: 600 mL / OUT: 150 mL / NET: 450 mL        PHYSICAL EXAM:  Vital Signs Last 24 Hrs  T(C): 36.8 (12 Jun 2025 04:42), Max: 38.5 (11 Jun 2025 17:52)  T(F): 98.2 (12 Jun 2025 04:42), Max: 101.3 (11 Jun 2025 17:52)  HR: 64 (12 Jun 2025 04:42) (64 - 97)  BP: 121/60 (12 Jun 2025 04:42) (111/50 - 174/85)  BP(mean): --  RR: 18 (12 Jun 2025 04:42) (18 - 20)  SpO2: 94% (12 Jun 2025 04:42) (92% - 99%)    Parameters below as of 12 Jun 2025 04:42  Patient On (Oxygen Delivery Method): nasal cannula        CONSTITUTIONAL: NAD; well-developed  HEENT: PERRL, clear conjunctiva  RESPIRATORY: Normal respiratory effort; lungs are clear to auscultation bilaterally; No crackles/rhonchi/wheezing  CARDIOVASCULAR: Regular rate and rhythm, normal S1 and S2, no murmur/rub/gallop; No lower extremity edema; Peripheral pulses are 2+ bilaterally  ABDOMEN: Nontender to palpation, normoactive bowel sounds, no rebound/guarding; No hepatosplenomegaly  MUSCULOSKELETAL: No clubbing or cyanosis of digits; no joint swelling or tenderness to palpation  EXTREMITY: Lower extremities non-tender to palpation; non-erythematous B/L  NEURO: A&Ox3; no focal deficits   PSYCH: Normal mood; affect appropriate    LABS:                        10.6   8.14  )-----------( 150      ( 11 Jun 2025 09:08 )             33.9     06-11    137  |  94[L]  |  50[H]  ----------------------------<  67[L]  3.9   |  24  |  8.62[H]    Ca    7.7[L]      11 Jun 2025 09:08  Phos  5.3     06-11  Mg     2.1     06-11    TPro  5.8[L]  /  Alb  3.4  /  TBili  0.4  /  DBili  x   /  AST  19  /  ALT  20  /  AlkPhos  83  06-11          Urinalysis Basic - ( 11 Jun 2025 09:08 )    Color: x / Appearance: x / SG: x / pH: x  Gluc: 67 mg/dL / Ketone: x  / Bili: x / Urobili: x   Blood: x / Protein: x / Nitrite: x   Leuk Esterase: x / RBC: x / WBC x   Sq Epi: x / Non Sq Epi: x / Bacteria: x        Culture - Blood (collected 09 Jun 2025 21:40)  Source: Blood Blood-Peripheral  Preliminary Report (12 Jun 2025 02:02):    No growth at 48 Hours    Culture - Blood (collected 09 Jun 2025 21:30)  Source: Blood Blood-Peripheral  Preliminary Report (12 Jun 2025 02:02):    No growth at 48 Hours        RADIOLOGY & ADDITIONAL TESTS:    N/A PROGRESS NOTE:     Patient is a 65y old  Male who presents with a chief complaint of Metabolic encephalopathy (11 Jun 2025 13:01)      SUBJECTIVE / OVERNIGHT EVENTS:    OVERNIGHT: No acute overnight events.      Patient was examined at bedside and feels well this morning, but complains of cough. Denies  chest pain, SOB, nausea, vomiting. ROS otherwise negative and pt is amenable to current treatment plan.      REVIEW OF SYSTEMS:    CONSTITUTIONAL:  No weakness, fevers, or chills  EYES/ENT:  No visual changes, vertigo, or throat pain   NECK:  No pain or stiffness  RESPIRATORY:  No SOB, +cough, +wheezing, or hemoptysis  CARDIOVASCULAR:  No chest pain or palpitations  GASTROINTESTINAL:  No abdominal pain, nausea, vomiting, or hematemesis; no diarrhea, constipation, melena, or hematochezia  GENITOURINARY:  No dysuria, polyuria, or hematuria  NEUROLOGICAL:  No numbness or weakness  SKIN:  No itching or rashes      MEDICATIONS  (STANDING):  allopurinol 100 milliGRAM(s) Oral daily  ascorbic acid 500 milliGRAM(s) Oral daily  aspirin  chewable 81 milliGRAM(s) Oral daily  atorvastatin 80 milliGRAM(s) Oral at bedtime  carvedilol 6.25 milliGRAM(s) Oral every 12 hours  chlorhexidine 2% Cloths 1 Application(s) Topical <User Schedule>  clopidogrel Tablet 75 milliGRAM(s) Oral daily  dextrose 5%. 1000 milliLiter(s) (50 mL/Hr) IV Continuous <Continuous>  dextrose 5%. 1000 milliLiter(s) (100 mL/Hr) IV Continuous <Continuous>  dextrose 5%. 1000 milliLiter(s) (50 mL/Hr) IV Continuous <Continuous>  dextrose 50% Injectable 25 Gram(s) IV Push once  dextrose 50% Injectable 12.5 Gram(s) IV Push once  dextrose 50% Injectable 25 Gram(s) IV Push once  folic acid 1 milliGRAM(s) Oral daily  gabapentin 300 milliGRAM(s) Oral two times a day  glucagon  Injectable 1 milliGRAM(s) IntraMuscular once  heparin   Injectable 5000 Unit(s) SubCutaneous every 12 hours  insulin lispro (ADMELOG) corrective regimen sliding scale   SubCutaneous three times a day before meals  insulin lispro (ADMELOG) corrective regimen sliding scale   SubCutaneous at bedtime  melatonin 3 milliGRAM(s) Oral at bedtime  piperacillin/tazobactam IVPB.. 3.375 Gram(s) IV Intermittent every 12 hours  sertraline 25 milliGRAM(s) Oral daily  valsartan 40 milliGRAM(s) Oral daily    MEDICATIONS  (PRN):  acetaminophen     Tablet .. 650 milliGRAM(s) Oral every 6 hours PRN Temp greater or equal to 38C (100.4F), Mild Pain (1 - 3)  dextrose Oral Gel 15 Gram(s) Oral once PRN Blood Glucose LESS THAN 70 milliGRAM(s)/deciliter      CAPILLARY BLOOD GLUCOSE      POCT Blood Glucose.: 133 mg/dL (12 Jun 2025 05:32)  POCT Blood Glucose.: 128 mg/dL (11 Jun 2025 23:36)  POCT Blood Glucose.: 92 mg/dL (11 Jun 2025 22:10)  POCT Blood Glucose.: 130 mg/dL (11 Jun 2025 17:19)  POCT Blood Glucose.: 77 mg/dL (11 Jun 2025 10:04)    I&O's Summary    11 Jun 2025 07:01  -  12 Jun 2025 07:00  --------------------------------------------------------  IN: 600 mL / OUT: 150 mL / NET: 450 mL        PHYSICAL EXAM:  Vital Signs Last 24 Hrs  T(C): 36.8 (12 Jun 2025 04:42), Max: 38.5 (11 Jun 2025 17:52)  T(F): 98.2 (12 Jun 2025 04:42), Max: 101.3 (11 Jun 2025 17:52)  HR: 64 (12 Jun 2025 04:42) (64 - 97)  BP: 121/60 (12 Jun 2025 04:42) (111/50 - 174/85)  BP(mean): --  RR: 18 (12 Jun 2025 04:42) (18 - 20)  SpO2: 94% (12 Jun 2025 04:42) (92% - 99%)    Parameters below as of 12 Jun 2025 04:42  Patient On (Oxygen Delivery Method): nasal cannula        CONSTITUTIONAL: NAD; well-developed  HEENT: PERRL, clear conjunctiva  RESPIRATORY: Normal respiratory effort; rhonchi at bases with expiratory wheezing bilaterally  CARDIOVASCULAR: Regular rate and rhythm, normal S1 and S2, no murmur/rub/gallop; No lower extremity edema; Peripheral pulses are 2+ bilaterally  ABDOMEN: Nontender to palpation, normoactive bowel sounds, no rebound/guarding; No hepatosplenomegaly  MUSCULOSKELETAL: No clubbing or cyanosis of digits; no joint swelling or tenderness to palpation  EXTREMITY: Lower extremities non-tender to palpation; non-erythematous B/L  NEURO: A&Ox3; no focal deficits   PSYCH: Normal mood; affect appropriate    LABS:                        10.6   8.14  )-----------( 150      ( 11 Jun 2025 09:08 )             33.9     06-11    137  |  94[L]  |  50[H]  ----------------------------<  67[L]  3.9   |  24  |  8.62[H]    Ca    7.7[L]      11 Jun 2025 09:08  Phos  5.3     06-11  Mg     2.1     06-11    TPro  5.8[L]  /  Alb  3.4  /  TBili  0.4  /  DBili  x   /  AST  19  /  ALT  20  /  AlkPhos  83  06-11          Urinalysis Basic - ( 11 Jun 2025 09:08 )    Color: x / Appearance: x / SG: x / pH: x  Gluc: 67 mg/dL / Ketone: x  / Bili: x / Urobili: x   Blood: x / Protein: x / Nitrite: x   Leuk Esterase: x / RBC: x / WBC x   Sq Epi: x / Non Sq Epi: x / Bacteria: x        Culture - Blood (collected 09 Jun 2025 21:40)  Source: Blood Blood-Peripheral  Preliminary Report (12 Jun 2025 02:02):    No growth at 48 Hours    Culture - Blood (collected 09 Jun 2025 21:30)  Source: Blood Blood-Peripheral  Preliminary Report (12 Jun 2025 02:02):    No growth at 48 Hours        RADIOLOGY & ADDITIONAL TESTS:    N/A

## 2025-06-12 NOTE — DIETITIAN INITIAL EVALUATION ADULT - OTHER CALCULATIONS
Estimated protein-energy needs calculated using IBW of 148 pounds with consideration for ESRD on HD, unable to rule out deep tissue pressure injury.  Defer fluid calculations to the medical team.

## 2025-06-12 NOTE — DISCHARGE NOTE PROVIDER - PROVIDER TOKENS
PROVIDER:[TOKEN:[[id number]],FOLLOWUP:[1 week]],PROVIDER:[TOKEN:[[id number]],FOLLOWUP:[1 week]]

## 2025-06-12 NOTE — PROGRESS NOTE ADULT - SUBJECTIVE AND OBJECTIVE BOX
Seen earlier today at HD unit    Chief Complaint: Diabetes Mellitus follow up    INTERVAL HX: Patient was seen at HD unit. "Feel hungry" patient had breakfast tray at bedside, RN was planning to give him food after HD, has mask on for droplet precaution. As per RN, pt was on D5W IVF running on arrival to HD unit but it was stopped after discussion with team. Over the past 24 hour BGs 92-100s with fasting  this am while on D5W and lunch        Review of Systems:  General: As above  GI: No nausea, vomiting  Endocrine: no  S&Sx of hypoglycemia    Allergies    No Known Allergies    Intolerances      MEDICATIONS  (STANDING):  albuterol/ipratropium for Nebulization. 3 milliLiter(s) Nebulizer once  allopurinol 100 milliGRAM(s) Oral daily  ascorbic acid 500 milliGRAM(s) Oral daily  aspirin  chewable 81 milliGRAM(s) Oral daily  atorvastatin 80 milliGRAM(s) Oral at bedtime  benzonatate 100 milliGRAM(s) Oral three times a day  carvedilol 6.25 milliGRAM(s) Oral every 12 hours  chlorhexidine 2% Cloths 1 Application(s) Topical <User Schedule>  clopidogrel Tablet 75 milliGRAM(s) Oral daily  dextrose 5%. 1000 milliLiter(s) (100 mL/Hr) IV Continuous <Continuous>  dextrose 5%. 1000 milliLiter(s) (50 mL/Hr) IV Continuous <Continuous>  dextrose 50% Injectable 25 Gram(s) IV Push once  dextrose 50% Injectable 12.5 Gram(s) IV Push once  dextrose 50% Injectable 25 Gram(s) IV Push once  folic acid 1 milliGRAM(s) Oral daily  gabapentin 300 milliGRAM(s) Oral two times a day  glucagon  Injectable 1 milliGRAM(s) IntraMuscular once  guaiFENesin  milliGRAM(s) Oral every 12 hours  heparin   Injectable 5000 Unit(s) SubCutaneous every 12 hours  insulin lispro (ADMELOG) corrective regimen sliding scale   SubCutaneous three times a day before meals  insulin lispro (ADMELOG) corrective regimen sliding scale   SubCutaneous at bedtime  melatonin 3 milliGRAM(s) Oral at bedtime  piperacillin/tazobactam IVPB.. 3.375 Gram(s) IV Intermittent every 12 hours  sertraline 25 milliGRAM(s) Oral daily  valsartan 40 milliGRAM(s) Oral daily      allopurinol   100 milliGRAM(s) Oral (06-12-25 @ 13:21)    atorvastatin   80 milliGRAM(s) Oral (06-11-25 @ 22:40)    insulin lispro (ADMELOG) corrective regimen sliding scale   1 Unit(s) SubCutaneous (06-12-25 @ 13:20)        PHYSICAL EXAM:  VITALS: T(C): 36.4 (06-12-25 @ 11:37)  T(F): 97.5 (06-12-25 @ 11:37), Max: 101.3 (06-11-25 @ 17:52)  HR: 86 (06-12-25 @ 11:37) (64 - 97)  BP: 165/85 (06-12-25 @ 11:37) (97/62 - 165/85)  RR:  (18 - 20)  SpO2:  (92% - 100%)  Wt(kg): --  GENERAL: male laying in bed, NAD, HD in progress  Respiratory: Respirations unlabored   Extremities: Warm, no edema  NEURO: Alert , appropriate     LABS:  POCT Blood Glucose.: 174 mg/dL (06-12-25 @ 13:07)  POCT Blood Glucose.: 104 mg/dL (06-12-25 @ 07:46)  POCT Blood Glucose.: 133 mg/dL (06-12-25 @ 05:32)  POCT Blood Glucose.: 128 mg/dL (06-11-25 @ 23:36)  POCT Blood Glucose.: 92 mg/dL (06-11-25 @ 22:10)  POCT Blood Glucose.: 130 mg/dL (06-11-25 @ 17:19)  POCT Blood Glucose.: 77 mg/dL (06-11-25 @ 10:04)  POCT Blood Glucose.: 116 mg/dL (06-11-25 @ 06:16)  POCT Blood Glucose.: 63 mg/dL (06-11-25 @ 05:54)  POCT Blood Glucose.: 68 mg/dL (06-11-25 @ 05:43)  POCT Blood Glucose.: 114 mg/dL (06-11-25 @ 02:25)  POCT Blood Glucose.: 61 mg/dL (06-11-25 @ 02:02)  POCT Blood Glucose.: 55 mg/dL (06-11-25 @ 02:00)  POCT Blood Glucose.: 116 mg/dL (06-10-25 @ 22:47)  POCT Blood Glucose.: 51 mg/dL (06-10-25 @ 22:22)  POCT Blood Glucose.: 58 mg/dL (06-10-25 @ 22:07)  POCT Blood Glucose.: 127 mg/dL (06-10-25 @ 17:15)  POCT Blood Glucose.: 68 mg/dL (06-10-25 @ 16:27)  POCT Blood Glucose.: 124 mg/dL (06-10-25 @ 12:21)  POCT Blood Glucose.: 145 mg/dL (06-10-25 @ 10:19)  POCT Blood Glucose.: 72 mg/dL (06-10-25 @ 09:42)  POCT Blood Glucose.: 111 mg/dL (06-10-25 @ 05:57)  POCT Blood Glucose.: 95 mg/dL (06-10-25 @ 04:47)  POCT Blood Glucose.: 118 mg/dL (06-10-25 @ 03:29)  POCT Blood Glucose.: 151 mg/dL (06-10-25 @ 02:31)  POCT Blood Glucose.: 315 mg/dL (06-10-25 @ 00:59)  POCT Blood Glucose.: 43 mg/dL (06-10-25 @ 00:50)  POCT Blood Glucose.: 42 mg/dL (06-10-25 @ 00:49)                          10.0   8.29  )-----------( 144      ( 12 Jun 2025 09:05 )             31.0     06-12    133[L]  |  94[L]  |  61[H]  ----------------------------<  94  4.1   |  23  |  10.47[H]    Ca    7.0[L]      12 Jun 2025 09:05  Phos  6.1     06-12  Mg     2.2     06-12    TPro  5.5[L]  /  Alb  2.9[L]  /  TBili  0.4  /  DBili  x   /  AST  22  /  ALT  19  /  AlkPhos  76  06-12    eGFR: 5 mL/min/1.73m2 (12 Jun 2025 09:05)      Thyroid Function Tests:  06-10 @ 03:48 TSH 2.26 FreeT4 -- T3 -- Anti TPO -- Anti Thyroglobulin Ab -- TSI --      A1C with Estimated Average Glucose Result: 12.4 % (06-10-25 @ 07:35)  A1C with Estimated Average Glucose Result: 12.8 % (06-10-25 @ 01:06)    Estimated Average Glucose: 309 mg/dL (06-10-25 @ 07:35)  Estimated Average Glucose: 321 mg/dL (06-10-25 @ 01:06)        Diet, Renal Restrictions:   For patients receiving Renal Replacement - No Protein Restr, No Conc K, No Conc Phos, Low Sodium (06-11-25 @ 09:05) [Active]

## 2025-06-12 NOTE — DIETITIAN INITIAL EVALUATION ADULT - ORAL INTAKE PTA/DIET HISTORY
Nutrition assessment completed at bedside. Per discussion with RN, pt noted to be confused, question accuracy of subjective information provided. Pt reported good appetite/po intake prior to admission with no recent significant changes. Pt endorsed UBW of ~190 pounds with no recent significant changes. Pt endorsed food allergy to shellfish (rash), RD updated allergy in chart.

## 2025-06-12 NOTE — DISCHARGE NOTE PROVIDER - NSDCMRMEDTOKEN_GEN_ALL_CORE_FT
allopurinol 100 mg oral tablet: 1 tab(s) orally once a day  aspirin 81 mg oral tablet: 1 tab(s) orally once a day  atorvastatin 80 mg oral tablet: 1 tab(s) orally once a day (at bedtime)  carvedilol 6.25 mg oral tablet: 1 tab(s) orally 2 times a day  clopidogrel 75 mg oral tablet: 1 tab(s) orally once a day  famotidine 10 mg oral tablet: 1 tab(s) orally once a day  folic acid 1 mg oral tablet: 1 tab(s) orally  gabapentin 300 mg oral tablet: 1 tab(s) orally 2 times a day  Melatonin 3 mg oral tablet: 1 tab(s) orally once a day (at bedtime)  sertraline 25 mg oral tablet: 1 tab(s) orally once a day  valsartan 40 mg oral tablet: 1 tab(s) orally once a day  Vitamin C 500 mg oral capsule: 1 cap(s) orally once a day   allopurinol 100 mg oral tablet: 1 tab(s) orally once a day  amoxicillin-clavulanate 875 mg-125 mg oral tablet: 1 tab(s) orally 2 times a day  aspirin 81 mg oral tablet: 1 tab(s) orally once a day  atorvastatin 80 mg oral tablet: 1 tab(s) orally once a day (at bedtime)  carvedilol 6.25 mg oral tablet: 1 tab(s) orally 2 times a day  clopidogrel 75 mg oral tablet: 1 tab(s) orally once a day  famotidine 10 mg oral tablet: 1 tab(s) orally once a day  folic acid 1 mg oral tablet: 1 tab(s) orally once a day  gabapentin 300 mg oral tablet: 1 tab(s) orally 2 times a day  HumaLOG 100 units/mL injectable solution: 4 unit(s) injectable 3 times a day  Melatonin 3 mg oral tablet: 1 tab(s) orally once a day (at bedtime)  sertraline 25 mg oral tablet: 1 tab(s) orally once a day  Toujeo SoloStar Prefilled Pen 300 units/mL subcutaneous solution: 8 unit(s) subcutaneous once a day (at bedtime)  valsartan 40 mg oral tablet: 1 tab(s) orally once a day  Vitamin C 500 mg oral capsule: 1 cap(s) orally once a day

## 2025-06-12 NOTE — PROGRESS NOTE ADULT - PROBLEM SELECTOR PLAN 4
Patient A&Ox3 at baseline, in ED had change in mental status with hypoxemia, labs notable for uremia iso missed HD and hypoglycemia. VBG without evidence of hypercapnia. CXR wnl without evidence of PNA    Note alternate MRN : [id number]    Plan:  -f/u blood cx, expanded RVP, remaining infectious workup  -Empiric zosyn for now 6/10-  - f/u B12, folate, TSH for reversible etiologies of encephalopathy  - f/u CT head non cont, CT chest non cont  -Monitor glucose levels  -Nephrology consult in AM for HD setup inpatient    RESOLVED  - Consider CT head if mental status not improving Patient A&Ox3 at baseline, in ED had change in mental status with hypoxemia, labs notable for uremia iso missed HD and hypoglycemia. VBG without evidence of hypercapnia. CXR wnl without evidence of PNA    Note alternate MRN : [id number]    Plan:  -f/u blood cx, expanded RVP, remaining infectious workup  -Empiric zosyn for now 6/10-  - f/u B12, folate, TSH for reversible etiologies of encephalopathy  - f/u CT head non cont, CT chest non cont  -Monitor glucose levels  -Nephrology consult in AM for HD setup inpatient    RESOLVED

## 2025-06-12 NOTE — DIETITIAN INITIAL EVALUATION ADULT - LITERATURE/VIDEOS GIVEN
[facility] Hemodialysis Packet  Carbohydrate Counting for People With Diabetes  Plate Method for Diabetes

## 2025-06-12 NOTE — DIETITIAN INITIAL EVALUATION ADULT - PROBLEM SELECTOR PLAN 1
Patient A&Ox3 at baseline, in ED had change in mental status with hypoxemia, labs notable for uremia iso missed HD and hypoglycemia. VBG without evidence of hypercapnia. CXR wnl without evidence of PNA    Note alternate MRN : [id number]    Plan:  -f/u blood cx, expanded RVP, remaining infectious workup  -Empiric zosyn for now 6/10-  - f/u B12, folate, TSH for reversible etiologies of encephalopathy  - f/u CT head non cont, CT chest non cont  -Monitor glucose levels  -Nephrology consult in AM for HD setup inpatient  - Consider CT head if mental status not improving

## 2025-06-12 NOTE — PROGRESS NOTE ADULT - PROBLEM SELECTOR PLAN 6
Diet: renal diet  DVT ppx: heparin subq  Dispo: pending clinical improvement    NOTE ALTERNATE MRN [id number]

## 2025-06-12 NOTE — DIETITIAN INITIAL EVALUATION ADULT - PERSON TAUGHT/METHOD
Provided education on renal diet for HD. Provided education on increased demand for kcal and protein intake to help prevent muscle/weight loss, reviewed foods high in potassium, phosphorus, and sodium, discussed foods recommended within therapeutic diet and protein portion sizing.     Provided education on Nutrition Therapy for Type 2 Diabetes. Emphasis on what foods contain carbohydrates, importance of pairing carbohydrates with protein for glycemic control; choosing whole grains vs refined carbohydrates; limiting refined sugars, portion sizes.     Educated on the importance of meeting adequate protein-energy needs. Encouraged consumption of protein-rich foods and prioritizing protein foods first at meal times. Pt fatigued towards end of interview, follow up with reinforcement of education as appropriate/able./verbal instruction/written material/patient instructed

## 2025-06-12 NOTE — DIETITIAN INITIAL EVALUATION ADULT - ETIOLOGY
related to increased metabolic demand for nutrients  related to diabetes and renal nutrition therapy

## 2025-06-12 NOTE — DIETITIAN INITIAL EVALUATION ADULT - PERTINENT LABORATORY DATA
06-12    133[L]  |  94[L]  |  61[H]  ----------------------------<  94  4.1   |  23  |  10.47[H]    Ca    7.0[L]      12 Jun 2025 09:05  Phos  6.1     06-12  Mg     2.2     06-12    TPro  5.5[L]  /  Alb  2.9[L]  /  TBili  0.4  /  DBili  x   /  AST  22  /  ALT  19  /  AlkPhos  76  06-12  POCT Blood Glucose.: 174 mg/dL (06-12-25 @ 13:07)  A1C with Estimated Average Glucose Result: 12.4 % (06-10-25 @ 07:35)  A1C with Estimated Average Glucose Result: 12.8 % (06-10-25 @ 01:06)

## 2025-06-12 NOTE — PROGRESS NOTE ADULT - PROBLEM SELECTOR PLAN 1
- Change in mental status in the ED with labs notable for uremia iso missed HD, and fluid overload  - RVP pos for parainfluenza virus  - CT chest with lower lobe opacities c/w atelectasis vs infection  - CXR on admission with pulmonary edema    Plan:  - trial off bipap, now NC  - c/w Zosyn  -monitor on  - Change in mental status in the ED with labs notable for uremia iso missed HD, and fluid overload  - RVP pos for parainfluenza virus  - CT chest with lower lobe opacities c/w atelectasis vs infection  - CXR on admission with pulmonary edema    Plan:  - satting well on NC  - c/w Zosyn  -monitor on

## 2025-06-12 NOTE — DIETITIAN INITIAL EVALUATION ADULT - ENERGY INTAKE
Pt reported good appetite/po intake in-house, stated he is consuming >75% of meals. Per discussion with RN, pt eating well in-house. Pt declined all oral nutrition supplements in-house at this time, RD to honor pt's preference. No food preferences provided by patient at this time.  Adequate (%)

## 2025-06-12 NOTE — DIETITIAN INITIAL EVALUATION ADULT - REASON
Nutrition focused physical exam not warranted at this time as pt stated he is eating well and has not experienced any recent unintentional weight changes.

## 2025-06-12 NOTE — PROGRESS NOTE ADULT - SUBJECTIVE AND OBJECTIVE BOX
Overnight events noted      VITAL:  T(C): , Max: 38.5 (06-11-25 @ 17:52)  T(F): , Max: 101.3 (06-11-25 @ 17:52)  HR: 64 (06-12-25 @ 04:42)  BP: 121/60 (06-12-25 @ 04:42)  BP(mean): --  RR: 18 (06-12-25 @ 04:42)  SpO2: 94% (06-12-25 @ 04:42)  Wt(kg): --      PHYSICAL EXAM:  Constitutional: alert, cooperative, nad on NC  Neck: Supple, No JVD  Respiratory: diminished at bases   Cardiovascular: RRR s1s2, no m/r/g  Gastrointestinal: BS+, soft, NT/ND  Extremities: No peripheral edema b/l  Back: no CVAT b/l  Skin: No rashes, no nevi  Access: LUE AVF (+)thrill    LABS:                        10.6   8.14  )-----------( 150      ( 11 Jun 2025 09:08 )             33.9     Na(137)/K(3.9)/Cl(94)/HCO3(24)/BUN(50)/Cr(8.62)Glu(67)/Ca(7.7)/Mg(2.1)/PO4(5.3)    06-11 @ 09:08  Na(139)/K(4.4)/Cl(99)/HCO3(18)/BUN(88)/Cr(11.79)Glu(89)/Ca(8.2)/Mg(2.7)/PO4(5.8)    06-10 @ 07:35  Na(136)/K(3.9)/Cl(96)/HCO3(19)/BUN(91)/Cr(11.47)Glu(332)/Ca(7.9)/Mg(2.4)/PO4(5.5)    06-10 @ 01:06  Na(139)/K(4.9)/Cl(99)/HCO3(20)/BUN(87)/Cr(11.30)Glu(132)/Ca(8.1)/Mg(2.4)/PO4(5.2)    06-09 @ 21:59        IMPRESSION: 65M w/ DM2, CHF, and ESRD-HD, 6/9/25 p/w parainfluenza bronchopneumonia and hypoglycemia    (1)Renal - ESRD - HD usually MWF; getting HD TTS this week, after having missed HD on Mon 6/9. We can plan to revert to MWF schedule next week.  (2)ID - parainfluenza bronchopnemonia  (3)Hypoglycemia - on D5W      RECOMMEND:  (1)Next HD today - 3h, 1kg UF as able  (2)Dose new meds for GFR<10/HD                Miles Gonzalez MD  [facility]  Office/on call physician: [phone]  Cell (7a-7p): [phone]       No pain, no sob  s/p HD today - tolerated 1L net UF, as ordered  Off D5W IV      VITAL:  T(C): , Max: 38.5 (06-11-25 @ 17:52)  T(F): , Max: 101.3 (06-11-25 @ 17:52)  HR: 64 (06-12-25 @ 04:42)  BP: 121/60 (06-12-25 @ 04:42)  BP(mean): --  RR: 18 (06-12-25 @ 04:42)  SpO2: 94% (06-12-25 @ 04:42)  Wt(kg): --      PHYSICAL EXAM:  Constitutional: alert, cooperative, nad on NC  Neck: Supple, No JVD  Respiratory: diminished at bases   Cardiovascular: RRR s1s2, no m/r/g  Gastrointestinal: BS+, soft, NT/ND  Extremities: No peripheral edema b/l  Back: no CVAT b/l  Skin: No rashes, no nevi  Access: LUE AVF (+)thrill    LABS:                        10.6   8.14  )-----------( 150      ( 11 Jun 2025 09:08 )             33.9     Na(137)/K(3.9)/Cl(94)/HCO3(24)/BUN(50)/Cr(8.62)Glu(67)/Ca(7.7)/Mg(2.1)/PO4(5.3)    06-11 @ 09:08  Na(139)/K(4.4)/Cl(99)/HCO3(18)/BUN(88)/Cr(11.79)Glu(89)/Ca(8.2)/Mg(2.7)/PO4(5.8)    06-10 @ 07:35  Na(136)/K(3.9)/Cl(96)/HCO3(19)/BUN(91)/Cr(11.47)Glu(332)/Ca(7.9)/Mg(2.4)/PO4(5.5)    06-10 @ 01:06  Na(139)/K(4.9)/Cl(99)/HCO3(20)/BUN(87)/Cr(11.30)Glu(132)/Ca(8.1)/Mg(2.4)/PO4(5.2)    06-09 @ 21:59        IMPRESSION: 65M w/ DM2, CHF, and ESRD-HD, 6/9/25 p/w parainfluenza bronchopneumonia and hypoglycemia    (1)Renal - ESRD - HD usually MWF; getting HD TTS this week, after having missed HD on Mon 6/9. We can plan to revert to MWF schedule next week.  (2)ID - parainfluenza bronchopnemonia  (3)Hypoglycemia - improved/now off D5W IV      RECOMMEND:  (1)Next HD Saturday 6/14 ==>back to MWF next week  (2)Dose new meds for GFR<10/HD                Miles Gonzalez MD  [facility]  Office/on call physician: [phone]  Cell (7a-7p): [phone]

## 2025-06-12 NOTE — DISCHARGE NOTE PROVIDER - NSDCCPCAREPLAN_GEN_ALL_CORE_FT
PRINCIPAL DISCHARGE DIAGNOSIS  Diagnosis: Metabolic encephalopathy  Assessment and Plan of Treatment: You were admitted to the hospital due to confusion and difficulty breathing. This was caused by several factors: you missed your regular dialysis treatment, leading to a buildup of toxins in your blood; you also had low blood sugar and a viral respiratory infection (parainfluenza). We gave you sugar to correct the low blood sugar and antibiotics (Zosyn) to treat a possible pneumonia. You also received dialysis to clean your blood.  Tests showed that your diabetes is not well controlled. An ultrasound also showed gallstones, but they weren't causing any problems at this time. You should return to the hospital immediately if you experience any confusion, shortness of breath, chest pain, fever, or other concerning symptoms. It is also crucial that you attend all your scheduled dialysis appointments and follow up with your endocrinologist to manage your diabetes.       PRINCIPAL DISCHARGE DIAGNOSIS  Diagnosis: Metabolic encephalopathy  Assessment and Plan of Treatment: You were admitted to the hospital due to confusion and difficulty breathing. This was caused by several factors: you missed your regular dialysis treatment, leading to a buildup of toxins in your blood; you also had low blood sugar and a viral respiratory infection (parainfluenza). We gave you sugar to correct the low blood sugar and antibiotics (Zosyn) to treat a possible pneumonia. You also received dialysis to clean your blood.  Tests showed that your diabetes is not well controlled. An ultrasound also showed gallstones, but they weren't causing any problems at this time. You should return to the hospital immediately if you experience any confusion, shortness of breath, chest pain, fever, or other concerning symptoms. It is also crucial that you attend all your scheduled dialysis appointments and follow up with your endocrinologist to manage your diabetes. Please take all medications exactly as prescribed. Please follow up with PCP and endocrinology. Please return to the ED if you experience any new or worsening symptoms.  Medication Changes:  Start Augmentin 875mg-125mg BID for 3 days (6/14-6/16)  START Humalog 4U with meals  Hold Toujeo for now, restart 8U if AM blood glucose >180 x 2 days  Follow Up:  1. PCP for general health maintenance  2. Endocrinology for T2DM

## 2025-06-12 NOTE — DIETITIAN INITIAL EVALUATION ADULT - NSFNSPHYEXAMSKINFT_GEN_A_CORE
Per Wound Care Note on 6/11:  "B/L buttocks/sacral hyperpigmentation, cannot rule out a deep tissue injury present on admission  B/L heel hyperpigmentation, cannot rule out a deep tissue injury present on admission"

## 2025-06-12 NOTE — DIETITIAN INITIAL EVALUATION ADULT - OTHER INFO
Weights:  - UBW (per patient): 190 pounds   - Dosing Weight (per chart): 190 pounds (stated) (6/10)  - Daily Weights (per flow sheets): 192.4 pounds (post HD)(6/12), 189.5 pounds (6/11)  - Per [facility] HIE: 190 pounds (4/8/25), 190 pounds (2/25/25), 192.14 pounds (9/12/24), 190 pounds (5/15/24)  HIE weights consistent with reported UBW and in-house weights at this time. RD to continue to monitor weights and trends as able.     Nutritional Concerns:  - Pt noted with acute hypoxemic respiratory failure (per chart). Ordered for antibiotics (per orders).   - Pt with Type 2 Diabetes (per chart). Pt noted with episodes of hypoglycemia in-house. POCT Glucose 104-174 mg/dL (6/12), Glucose 94 mg/dL (6/12), Glucose 67 mg/dL (6/11). Ordered for insulin in-house (per orders). A1C 12.4% (6/10) indicating suboptimal control for age.   - Pt with ESRD on HD, last HD today (6/12) 1000 mL removed (per flow sheets). Potassium within normal limits (6/12), Phosphorous elevated (6/12).   - Ordered for Vitamin C and Folic Acid (per orders).

## 2025-06-12 NOTE — DIETITIAN INITIAL EVALUATION ADULT - SIGNS/SYMPTOMS
as evidenced by pt with ESRD on HD, unable to rule out deep tissue pressure injury.  as evidenced by pt with DM, A1C 12.4%, ESRD on HD with elevated phosphorous.

## 2025-06-12 NOTE — DIETITIAN INITIAL EVALUATION ADULT - NS FNS DIET ORDER
Diet, Renal Restrictions:   For patients receiving Renal Replacement - No Protein Restr, No Conc K, No Conc Phos, Low Sodium (06-11-25 @ 09:05) [Active]

## 2025-06-12 NOTE — PROGRESS NOTE ADULT - PROBLEM SELECTOR PLAN 2
Poorly controlled DM2, A1c reported >10 and patient with poor adherence per wife. Experienced abrupt change in mental status in the ED with labs notable for hypoglycemia, improved after 2 amps of D50    Plan:  -monitor FS Q4 hrs  -f/u HbA1c- 12%  -D5 LR gentle fluids if persistently low  - endo c/s, recommend CAIN Poorly controlled DM2, A1c reported >10 and patient with poor adherence per wife. Experienced abrupt change in mental status in the ED with labs notable for hypoglycemia, improved after 2 amps of D50  - cortisol 30.2, ACTS 32.7, C peptide1.3  Plan:  -monitor FS Q4 hrs  -f/u HbA1c- 12%  - endo c/s, recommend CAIN

## 2025-06-12 NOTE — PROGRESS NOTE ADULT - PROBLEM SELECTOR PLAN 5
No medication data available in surescripts and pt unable to provide.     Plan:  -Please obtain med rec from family in AM Diet: renal diet  DVT ppx: heparin subq  Dispo: pending clinical improvement    NOTE ALTERNATE MRN [id number]

## 2025-06-12 NOTE — DIETITIAN INITIAL EVALUATION ADULT - NSFNSADHERENCEPTAFT_GEN_A_CORE
Pt stated he follows a regular diet prior to admission, no restrictions. Pt confirmed history of Type 2 Diabetes, stated he does not follow any dietary restrictions for diabetes. Stated he takes insulin at home for glycemic control (home regimen includes Treseiba 24 units and Humalog 12 unit TID with sliding scale, noted in Endocrine note). Pt stated he also wears a Freestyle Vincent 3, reports average blood glucose range between 140-200 mg/dL. A1C noted to be 12.4% (6/10) indicating suboptimal control for age.    2552

## 2025-06-12 NOTE — DISCHARGE NOTE PROVIDER - NSDCCPTREATMENT_GEN_ALL_CORE_FT
PRINCIPAL PROCEDURE  Procedure: CT chest w contrast  Findings and Treatment: IMPRESSION:.  Small bilateral pleural effusions.  Bilateral lower lobe opacities favored to represent atelectasis, but   consider infectious etiology in the appropriate clinical scenario.  Partially imaged gallbladder with gallstones and possible gallbladder   wall edema; recommend correlation with gallbladder ultrasound to more   accurately characterize.        SECONDARY PROCEDURE  Procedure: XR chest AP  Findings and Treatment:   FINDINGS:  Perihilar interstitial opacities. Trace effusions  There is no pneumothorax.  The heart is magnified by technique. Coronary stent. CardioMEMS device in   the left hilum.  The visualized osseous structures demonstrate no acute pathology.  IMPRESSION:  Mild pulmonary edema      Procedure: CT head  Findings and Treatment: IMPRESSION: No acute intracranial hemorrhage, mass effect, or shift of   the midline structures.  Small age-indeterminate lacunar infarct in the left gangliocapsular   junction.  Mild chronic white matter microvascular type changes.      Procedure: US abdomen RUQ  Findings and Treatment:   FINDINGS:  Liver: Within normal limits.  Bile ducts: Normal caliber. Common bile duct measures 3 mm.  Gallbladder: Cholelithiasis. Gallbladder wall thickening measuring up to   5 mm. No pericholecystic fluid. Negative sonographic Mena's sign.   Unable to turn left lateral decubitus position as patient is on bipap.  Pancreas: Not visualized due to overlying bowel gas.  Right kidney: 10.7 cm. No hydronephrosis.  Ascites: None.  IVC: Visualized portions mildly prominent.  Aorta: Atherosclerosis  Miscellaneous: Small right pleural effusion  IMPRESSION:  Cholelithiasis without acute cholecystitis       no change in level of consciousness/no blurred vision

## 2025-06-12 NOTE — DIETITIAN INITIAL EVALUATION ADULT - PERTINENT MEDS FT
MEDICATIONS  (STANDING):  albuterol/ipratropium for Nebulization. 3 milliLiter(s) Nebulizer once  allopurinol 100 milliGRAM(s) Oral daily  ascorbic acid 500 milliGRAM(s) Oral daily  aspirin  chewable 81 milliGRAM(s) Oral daily  atorvastatin 80 milliGRAM(s) Oral at bedtime  benzonatate 100 milliGRAM(s) Oral three times a day  carvedilol 6.25 milliGRAM(s) Oral every 12 hours  chlorhexidine 2% Cloths 1 Application(s) Topical <User Schedule>  clopidogrel Tablet 75 milliGRAM(s) Oral daily  dextrose 5%. 1000 milliLiter(s) (100 mL/Hr) IV Continuous <Continuous>  dextrose 5%. 1000 milliLiter(s) (50 mL/Hr) IV Continuous <Continuous>  dextrose 50% Injectable 25 Gram(s) IV Push once  dextrose 50% Injectable 12.5 Gram(s) IV Push once  dextrose 50% Injectable 25 Gram(s) IV Push once  folic acid 1 milliGRAM(s) Oral daily  gabapentin 300 milliGRAM(s) Oral two times a day  glucagon  Injectable 1 milliGRAM(s) IntraMuscular once  guaiFENesin  milliGRAM(s) Oral every 12 hours  heparin   Injectable 5000 Unit(s) SubCutaneous every 12 hours  insulin lispro (ADMELOG) corrective regimen sliding scale   SubCutaneous three times a day before meals  insulin lispro (ADMELOG) corrective regimen sliding scale   SubCutaneous <User Schedule>  melatonin 3 milliGRAM(s) Oral at bedtime  piperacillin/tazobactam IVPB.. 3.375 Gram(s) IV Intermittent every 12 hours  sertraline 25 milliGRAM(s) Oral daily  valsartan 40 milliGRAM(s) Oral daily    MEDICATIONS  (PRN):  acetaminophen     Tablet .. 650 milliGRAM(s) Oral every 6 hours PRN Temp greater or equal to 38C (100.4F), Mild Pain (1 - 3)  dextrose Oral Gel 15 Gram(s) Oral once PRN Blood Glucose LESS THAN 70 milliGRAM(s)/deciliter

## 2025-06-12 NOTE — DISCHARGE NOTE PROVIDER - CARE PROVIDER_API CALL
Lupe Flowers  [facility]  [address]  [place] [address]  Phone: [phone]  Fax: [phone]  Follow Up Time: 1 week    David Maher  Endocrinology/Metab/Diabetes  [address]  [place] [address]  Phone: [phone]  Fax: [phone]  Follow Up Time: 1 week

## 2025-06-12 NOTE — PROGRESS NOTE ADULT - ASSESSMENT
66 yo M with a history of CHF, T2DM, ESRD on HD presented today after missing HD today, admitted for AHRF requiring BIPAP iso volume overload 2/2 missed HD found to have parainfluenza virus, now off bipap course c/b recurrent hypoglycemia

## 2025-06-12 NOTE — DIETITIAN INITIAL EVALUATION ADULT - NSFNSGIIOFT_GEN_A_CORE
Pt reported no GI distress at this time.   - Last Bowel Movement: 6/12 (per patient)   - Bowel Regimen: Pt not currently ordered for a bowel regimen at this time (per orders).   Continue to monitor bowel movements and bowel movement regularity.

## 2025-06-12 NOTE — DIETITIAN INITIAL EVALUATION ADULT - ADD RECOMMEND
- Recommend Renal, Consistent Carbohydrate diet as tolerated. Defer diet textures/consistencies to medical team.   - Recommend add Nephro-Vane daily for micronutrient coverage unless contraindicated.  - Pt declined all oral nutrition supplements in-house at this time. Aware RD remains available.   - Monitor and encourage adequate po intake. Obtain food preferences and honor as able.   - Monitor electrolytes daily and replete as needed. Monitor renal indices as pt with ESRD on HD. Monitor glucose fingersticks in setting of DM and previous hypoglycemia.   - Monitor nutritional intake, tolerance to diet prescription, bowel movements, weights, labs, and skin integrity.   - RD remains available for diet education/review as needed.

## 2025-06-12 NOTE — PROGRESS NOTE ADULT - ATTENDING COMMENTS
65M w/ hx of ESRD on HD M, W, F, CAD s/p 2 stents, IDDM2 on Toujeo, HTN, HLD, gout, BPH p/w acute metabolic encephalopathy from dialysis. As per wife, pt has been having URI symptoms for past 3 days or so. Pt working initially. At baseline tends to sleep a lot after eating. Sent to ER from dialysis for further evaluation for lethargy. Admitted for dialysis. Had RRT with worsened AMS also with hypoxia and hypoglycemia improved with D50. Patient seems to have 2nd MRN [id number]. Wife endorses multiple admissions in past for AMS related to fevers and infection. Also note hx of SDH in 2022 possibly related to fall. Appreciate MICU recommendations, MICU rejected, not Emergent dialysis candidate on admission. Some degree of medication and life-style non-compliance at baseline.    #AMS likely toxic metabolic in setting of hypoglycemia, elevated BUN, possible sepsis    CT chest Small bilateral pleural effusions. Bilateral lower lobe opacities favored to represent atelectasis, but consider infectious etiology in the appropriate clinical scenario. Partially imaged gallbladder with gallstones and possible gallbladder wall edema; recommend correlation with gallbladder ultrasound to more accurately characterize.    CT head No acute intracranial hemorrhage, mass effect, or shift of the midline structures. Small age-indeterminate lacunar infarct in the left gangliocapsular junction. Mild chronic white matter microvascular type changes.    - RVP + parainfl  - off bipap, now on NC 3L, wean as tolerated  - continue zosyn   - Frequent FS Q4hrs, hypoglycemia protocol prn - f/u insulin, c peptide, cortisol unremarkable  - f/u nephro for HD   - f/u blood cx ngtd, urine cx  - A1C 12.4 > consulted endo   - abd US - Cholelithiasis without acute cholecystitis  - tte  - complete med recs and resume home meds as appropriate   - monitor cbc, bmp, blood gas, frequent vital signs, neuro checks  - trend troponin. Repeat EKG. check probnp. check echo  - urine legionella + strep pn ag neg  - DVT PPX. fall + aspiration precautions  - PT rec ANGELINE, f/u CM

## 2025-06-12 NOTE — DISCHARGE NOTE PROVIDER - HOSPITAL COURSE
HPI:  66 yo M with a history of CHF, T2DM, ESRD on HD presented today after missing HD today. In the ED, had an RRT called for a change in mental status and presents with respiratory symptoms and fever    Labs notable for glucose 42, receiving 2 amps D50. Patient HDS, afebrile, on NRB now weaned off. Patient had improved responsiveness 5 minutes later (AOx2), able to move all extremities. BG on recheck > 300. Blood cx collected. Patient likely with underlying metabolic encephalopathy in the setting of uremia BUN 87, worsened by hypoglycemic episode.  (10 Juan 2025 02:15)    Hospital Course:  Patient was admitted to medicine for further management of metabolic encephalopathy.  He presented with altered mental status, hypoxemia, and hypoglycemia, which improved with dextrose administration.  His encephalopathy was attributed to missed hemodialysis, resulting in uremia, and superimposed hypoglycemia.  He was also found to have parainfluenza virus and was treated with Zosyn for possible pneumonia.  His blood and urine cultures were negative. He received hemodialysis and was placed on supplemental oxygen, which was later weaned.  Endocrinology was consulted for management of his poorly controlled diabetes, and his A1c was found to be elevated at 12%.  An abdominal ultrasound showed cholelithiasis without cholecystitis.  He was placed on a renal diet and heparin for DVT prophylaxis. On day of discharge, patient is clinically stable with no new exam findings or acute symptoms compared to prior. The patient was seen by the attending physician on the date of discharge and deemed stable and acceptable for discharge. The patient's chronic medical conditions were treated accordingly per the patient's home medication regimen. The patient's medication reconciliation (with changes made to chronic medications), follow up appointments, discharge orders, instructions, and significant lab and diagnostic studies are as noted.    Important Medication Changes and Reason:    Active or Pending Issues Requiring Follow-up:  Endocrinology f/u for T2DM management  Advanced Directives:   [X] Full code  [ ] DNR  [ ] Hospice    Discharge Diagnoses:  Metabolic encephalopathy  URI, pneumonia     HPI:  64 yo M with a history of CHF, T2DM, ESRD on HD presented today after missing HD today. In the ED, had an RRT called for a change in mental status and presents with respiratory symptoms and fever    Labs notable for glucose 42, receiving 2 amps D50. Patient HDS, afebrile, on NRB now weaned off. Patient had improved responsiveness 5 minutes later (AOx2), able to move all extremities. BG on recheck > 300. Blood cx collected. Patient likely with underlying metabolic encephalopathy in the setting of uremia BUN 87, worsened by hypoglycemic episode.  (10 Juan 2025 02:15)    Hospital Course:  Patient was admitted to medicine for further management of metabolic encephalopathy.  He presented with altered mental status, hypoxemia, and hypoglycemia, which improved with dextrose administration.  His encephalopathy was attributed to missed hemodialysis, resulting in uremia, and superimposed hypoglycemia.  He was also found to have parainfluenza virus and was treated with Zosyn for possible pneumonia.  His blood and urine cultures were negative. He received hemodialysis and was placed on supplemental oxygen, which was later weaned.  Endocrinology was consulted for management of his poorly controlled diabetes, and his A1c was found to be elevated at 12%.  An abdominal ultrasound showed cholelithiasis without cholecystitis.  He was placed on a renal diet and heparin for DVT prophylaxis. On day of discharge, patient is clinically stable with no new exam findings or acute symptoms compared to prior. The patient was seen by the attending physician on the date of discharge and deemed stable and acceptable for discharge. The patient's chronic medical conditions were treated accordingly per the patient's home medication regimen. The patient's medication reconciliation (with changes made to chronic medications), follow up appointments, discharge orders, instructions, and significant lab and diagnostic studies are as noted.    Important Medication Changes and Reason:  Start Augmentin 875mg-125mg BID for 3 days (6/14-6/16)  START Humalog 4U with meals  Hold Toujeo for now, restart 8U if AM blood glucose >180 x 2 days    Active or Pending Issues Requiring Follow-up:  Endocrinology f/u for T2DM management  Advanced Directives:   [X] Full code  [ ] DNR  [ ] Hospice    Discharge Diagnoses:  Metabolic encephalopathy  URI, pneumonia

## 2025-06-12 NOTE — PROGRESS NOTE ADULT - SUBJECTIVE AND OBJECTIVE BOX
Date of Service: 06-12-25 @ 07:48           CARDIOLOGY     PROGRESS  NOTE   ________________________________________________  CHIEF COMPLAINT:Patient is a 65y old  Male who presents with a chief complaint of Metabolic encephalopathy (12 Jun 2025 07:19)  no complain  	  REVIEW OF SYSTEMS:  CONSTITUTIONAL: No fever, weight loss, or fatigue  EYES: No eye pain, visual disturbances, or discharge  ENT:  No difficulty hearing, tinnitus, vertigo; No sinus or throat pain  NECK: No pain or stiffness  RESPIRATORY: No cough, wheezing, chills or hemoptysis; No Shortness of Breath  CARDIOVASCULAR: No chest pain, palpitations, passing out, dizziness, or leg swelling  GASTROINTESTINAL: No abdominal or epigastric pain. No nausea, vomiting, or hematemesis; No diarrhea or constipation. No melena or hematochezia.  GENITOURINARY: No dysuria, frequency, hematuria, or incontinence  NEUROLOGICAL: No headaches, memory loss, loss of strength, numbness, or tremors  SKIN: No itching, burning, rashes, or lesions   LYMPH Nodes: No enlarged glands  ENDOCRINE: No heat or cold intolerance; No hair loss  MUSCULOSKELETAL: No joint pain or swelling; No muscle, back, or extremity pain  PSYCHIATRIC: No depression, anxiety, mood swings, or difficulty sleeping  HEME/LYMPH: No easy bruising, or bleeding gums  ALLERGY AND IMMUNOLOGIC: No hives or eczema	    [ x] All others negative	  [ ] Unable to obtain    PHYSICAL EXAM:  T(C): 36.8 (06-12-25 @ 04:42), Max: 38.5 (06-11-25 @ 17:52)  HR: 64 (06-12-25 @ 04:42) (64 - 97)  BP: 121/60 (06-12-25 @ 04:42) (111/50 - 174/85)  RR: 18 (06-12-25 @ 04:42) (18 - 20)  SpO2: 94% (06-12-25 @ 04:42) (92% - 99%)  Wt(kg): --  I&O's Summary    11 Jun 2025 07:01  -  12 Jun 2025 07:00  --------------------------------------------------------  IN: 600 mL / OUT: 150 mL / NET: 450 mL        Appearance: Normal	  HEENT:   Normal oral mucosa, PERRL, EOMI	  Lymphatic: No lymphadenopathy  Cardiovascular: Normal S1 S2, No JVD, + murmurs, No edema  Respiratory: rhonchi  Psychiatry: A & O x 3, Mood & affect appropriate  Gastrointestinal:  Soft, Non-tender, + BS	  Skin: No rashes, No ecchymoses, No cyanosis	  Extremities: , No clubbing, cyanosis or edema  Vascular: Peripheral pulses palpable 2+ bilaterally    MEDICATIONS  (STANDING):  allopurinol 100 milliGRAM(s) Oral daily  ascorbic acid 500 milliGRAM(s) Oral daily  aspirin  chewable 81 milliGRAM(s) Oral daily  atorvastatin 80 milliGRAM(s) Oral at bedtime  carvedilol 6.25 milliGRAM(s) Oral every 12 hours  chlorhexidine 2% Cloths 1 Application(s) Topical <User Schedule>  clopidogrel Tablet 75 milliGRAM(s) Oral daily  dextrose 5%. 1000 milliLiter(s) (50 mL/Hr) IV Continuous <Continuous>  dextrose 5%. 1000 milliLiter(s) (100 mL/Hr) IV Continuous <Continuous>  dextrose 5%. 1000 milliLiter(s) (50 mL/Hr) IV Continuous <Continuous>  dextrose 50% Injectable 25 Gram(s) IV Push once  dextrose 50% Injectable 12.5 Gram(s) IV Push once  dextrose 50% Injectable 25 Gram(s) IV Push once  folic acid 1 milliGRAM(s) Oral daily  gabapentin 300 milliGRAM(s) Oral two times a day  glucagon  Injectable 1 milliGRAM(s) IntraMuscular once  heparin   Injectable 5000 Unit(s) SubCutaneous every 12 hours  insulin lispro (ADMELOG) corrective regimen sliding scale   SubCutaneous three times a day before meals  insulin lispro (ADMELOG) corrective regimen sliding scale   SubCutaneous at bedtime  melatonin 3 milliGRAM(s) Oral at bedtime  piperacillin/tazobactam IVPB.. 3.375 Gram(s) IV Intermittent every 12 hours  sertraline 25 milliGRAM(s) Oral daily  valsartan 40 milliGRAM(s) Oral daily      TELEMETRY: 	    ECG:  	  RADIOLOGY:  OTHER: 	  	  LABS:	 	    CARDIAC MARKERS:                            10.6   8.14  )-----------( 150      ( 11 Jun 2025 09:08 )             33.9     06-11    137  |  94[L]  |  50[H]  ----------------------------<  67[L]  3.9   |  24  |  8.62[H]    Ca    7.7[L]      11 Jun 2025 09:08  Phos  5.3     06-11  Mg     2.1     06-11    TPro  5.8[L]  /  Alb  3.4  /  TBili  0.4  /  DBili  x   /  AST  19  /  ALT  20  /  AlkPhos  83  06-11    proBNP:   Lipid Profile:   HgA1c:   TSH: Thyroid Stimulating Hormone, Serum: 2.26 uIU/mL (06-10 @ 03:48)          Assessment and plan  ---------------------------  66 yo M with a history of CHF, T2DM, ESRD on HD presented today after missing HD today. In the ED, had an RRT called for a change in mental status and presents with respiratory symptoms and fever  Labs notable for glucose 42, receiving 2 amps D50. Patient HDS, afebrile, on NRB now weaned off. Patient had improved responsiveness 5 minutes later (AOx2), able to move all extremities. BG on recheck > 300. Blood cx collected. Patient likely with underlying metabolic encephalopathy in the setting of uremia BUN 87, worsened by hypoglycemic episode.   pt is well known to me eith sig cardiac hx s/p multiple stents, esrd on HD , DM with change of mental status  medical record is new should be change to the old pne to retreat all the records  continue all cardiac meds  spoke to the wife  agree with hd with increase fluid withrawal as tolertaed sec to abnormal chest x ray with fluid overload  will review  office records  pt with sig cardiac hx , start on asa daily, need to get meds record from the wife  echo to evaluate EF  dvt prophylaxis, follow blood sugar level closely  medication noted, continue current meds  echo, pt with hx of ischemic cardiomyopathy  physical therapy    	         Date of Service: 06-12-25 @ 07:48           CARDIOLOGY     PROGRESS  NOTE   ________________________________________________  CHIEF COMPLAINT:Patient is a 65y old  Male who presents with a chief complaint of Metabolic encephalopathy (12 Jun 2025 07:19)  no complain  	  REVIEW OF SYSTEMS:  CONSTITUTIONAL: No fever, weight loss, or fatigue  EYES: No eye pain, visual disturbances, or discharge  ENT:  No difficulty hearing, tinnitus, vertigo; No sinus or throat pain  NECK: No pain or stiffness  RESPIRATORY: No cough, wheezing, chills or hemoptysis; No Shortness of Breath  CARDIOVASCULAR: No chest pain, palpitations, passing out, dizziness, or leg swelling  GASTROINTESTINAL: No abdominal or epigastric pain. No nausea, vomiting, or hematemesis; No diarrhea or constipation. No melena or hematochezia.  GENITOURINARY: No dysuria, frequency, hematuria, or incontinence  NEUROLOGICAL: No headaches, memory loss, loss of strength, numbness, or tremors  SKIN: No itching, burning, rashes, or lesions   LYMPH Nodes: No enlarged glands  ENDOCRINE: No heat or cold intolerance; No hair loss  MUSCULOSKELETAL: No joint pain or swelling; No muscle, back, or extremity pain  PSYCHIATRIC: No depression, anxiety, mood swings, or difficulty sleeping  HEME/LYMPH: No easy bruising, or bleeding gums  ALLERGY AND IMMUNOLOGIC: No hives or eczema	    [ x] All others negative	  [ ] Unable to obtain    PHYSICAL EXAM:  T(C): 36.8 (06-12-25 @ 04:42), Max: 38.5 (06-11-25 @ 17:52)  HR: 64 (06-12-25 @ 04:42) (64 - 97)  BP: 121/60 (06-12-25 @ 04:42) (111/50 - 174/85)  RR: 18 (06-12-25 @ 04:42) (18 - 20)  SpO2: 94% (06-12-25 @ 04:42) (92% - 99%)  Wt(kg): --  I&O's Summary    11 Jun 2025 07:01  -  12 Jun 2025 07:00  --------------------------------------------------------  IN: 600 mL / OUT: 150 mL / NET: 450 mL        Appearance: Normal	  HEENT:   Normal oral mucosa, PERRL, EOMI	  Lymphatic: No lymphadenopathy  Cardiovascular: Normal S1 S2, No JVD, + murmurs, No edema  Respiratory: rhonchi  Psychiatry: A & O x 3, Mood & affect appropriate  Gastrointestinal:  Soft, Non-tender, + BS	  Skin: No rashes, No ecchymoses, No cyanosis	  Extremities: , No clubbing, cyanosis or edema  Vascular: Peripheral pulses palpable 2+ bilaterally    MEDICATIONS  (STANDING):  allopurinol 100 milliGRAM(s) Oral daily  ascorbic acid 500 milliGRAM(s) Oral daily  aspirin  chewable 81 milliGRAM(s) Oral daily  atorvastatin 80 milliGRAM(s) Oral at bedtime  carvedilol 6.25 milliGRAM(s) Oral every 12 hours  chlorhexidine 2% Cloths 1 Application(s) Topical <User Schedule>  clopidogrel Tablet 75 milliGRAM(s) Oral daily  dextrose 5%. 1000 milliLiter(s) (50 mL/Hr) IV Continuous <Continuous>  dextrose 5%. 1000 milliLiter(s) (100 mL/Hr) IV Continuous <Continuous>  dextrose 5%. 1000 milliLiter(s) (50 mL/Hr) IV Continuous <Continuous>  dextrose 50% Injectable 25 Gram(s) IV Push once  dextrose 50% Injectable 12.5 Gram(s) IV Push once  dextrose 50% Injectable 25 Gram(s) IV Push once  folic acid 1 milliGRAM(s) Oral daily  gabapentin 300 milliGRAM(s) Oral two times a day  glucagon  Injectable 1 milliGRAM(s) IntraMuscular once  heparin   Injectable 5000 Unit(s) SubCutaneous every 12 hours  insulin lispro (ADMELOG) corrective regimen sliding scale   SubCutaneous three times a day before meals  insulin lispro (ADMELOG) corrective regimen sliding scale   SubCutaneous at bedtime  melatonin 3 milliGRAM(s) Oral at bedtime  piperacillin/tazobactam IVPB.. 3.375 Gram(s) IV Intermittent every 12 hours  sertraline 25 milliGRAM(s) Oral daily  valsartan 40 milliGRAM(s) Oral daily      TELEMETRY: 	    ECG:  	  RADIOLOGY:  OTHER: 	  	  LABS:	 	    CARDIAC MARKERS:                            10.6   8.14  )-----------( 150      ( 11 Jun 2025 09:08 )             33.9     06-11    137  |  94[L]  |  50[H]  ----------------------------<  67[L]  3.9   |  24  |  8.62[H]    Ca    7.7[L]      11 Jun 2025 09:08  Phos  5.3     06-11  Mg     2.1     06-11    TPro  5.8[L]  /  Alb  3.4  /  TBili  0.4  /  DBili  x   /  AST  19  /  ALT  20  /  AlkPhos  83  06-11    proBNP:   Lipid Profile:   HgA1c:   TSH: Thyroid Stimulating Hormone, Serum: 2.26 uIU/mL (06-10 @ 03:48)    Respiratory Viral Panel with COVID-19 by ASYA (06.09.25 @ 21:58)    Rapid RVP Result: Detected   SARS-CoV-2: [facility]: This Respiratory Panel uses polymerase chain reaction (PCR) to detect for  adenovirus; coronavirus (HKU1, NL63, 229E, OC43); human metapneumovirus  (hMPV); human enterovirus/rhinovirus (Entero/RV); influenza A; influenza  A/H1; influenza A/H3; influenza A/H1-2009; influenza B; parainfluenza  viruses 1, 2, 3, 4; respiratory syncytial virus; Mycoplasma pneumoniae;  Chlamydophila pneumoniae; and SARS-CoV-2.   Parainfluenza 3 (RapRVP): Detected          Assessment and plan  ---------------------------  64 yo M with a history of CHF, T2DM, ESRD on HD presented today after missing HD today. In the ED, had an RRT called for a change in mental status and presents with respiratory symptoms and fever  Labs notable for glucose 42, receiving 2 amps D50. Patient HDS, afebrile, on NRB now weaned off. Patient had improved responsiveness 5 minutes later (AOx2), able to move all extremities. BG on recheck > 300. Blood cx collected. Patient likely with underlying metabolic encephalopathy in the setting of uremia BUN 87, worsened by hypoglycemic episode.   pt is well known to me eith sig cardiac hx s/p multiple stents, esrd on HD , DM with change of mental status  medical record is new should be change to the old pne to retreat all the records  continue all cardiac meds  spoke to the wife  agree with hd with increase fluid withrawal as tolertaed sec to abnormal chest x ray with fluid overload  will review  office records  pt with sig cardiac hx , start on asa daily, need to get meds record from the wife  echo to evaluate EF  dvt prophylaxis, follow blood sugar level closely  medication noted, continue current meds  echo, pt with hx of ischemic cardiomyopathy  RVP +  physical therapy

## 2025-06-12 NOTE — PROGRESS NOTE ADULT - ASSESSMENT
65-year-old male with history of CHF, type 2 diabetes, end-stage renal disease, presented after missing dialysis, patient had mental status change, had a rapid response and noted to have respiratory symptoms and fever.  Labs notable for hyperglycemia.  Endocrinology consulted for management of poorly controlled type 2 diabetes with hypoglycemia. BG Goal 100-180mg/dl   Tolerating renal diet. Over the past 24 hour BGs 92-100s with fasting  this am without overnight hypoglycemia while on D5W and lunch . Now D5W IVF discontinued       1.  Type 2 diabetes  A1C:A1C with Estimated Average Glucose Result: 12.4 % (06-10-25 @ 07:35)  A1C with Estimated Average Glucose Result: 12.8 % (06-10-25 @ 01:06)  Home regimen: Treseiba 24 units and Humalog 12 unit TID with sliding scale   Endocrinologist:Dr Maher   eGFR: 5 (06.12.25 @ 09:05), on HD     Plan   Test BGs ACTID and at HS  Continue with low does sliding scale( (1:50 above 150mg/dl))ACTID and seperate low correction scale at HS and 2 am.   Continue to hold standing insulin for now.   Will start lower dose standing insulin if BGs > 180 X2 ( Lantus 8 units at HS, Admelog 2 units with meals )   Continue consistent carbohydrate diet   Please keep hypoglycemia protocol in place     Discharge recommendation/considerations:  Discharge on Basal bolus ( The dose TBD)  patient should check FSBG premeals and bedtime. Patient should call their doctor when FSBG <70 or above >400 and or consistently above 200s as changes in the regimen will have to be made.   Please make sure patient has all DM management supplies   Please send Rx for Glucose tabs, Baqsimi nasal spray or glucagon emergency kit for hypoglycemia risk   Should follow up with [facility] with Dr. Maher  ([phone]) at [address] [place] [address] [address], [place] [address]   Pending patient assistance to get [facility] outpatient   Routine follow-up with Ophthalmology and Podiatry       # Hypertension  BP goal <130/80  Currently on Valsartan and carvedilol  Patient currently on hemodialysis, appreciate recommendation by nephrology team and dialysis team    # Hyperlipidemia  LDL goal <70 mg/dL  At home patient is on atorvastatin 80 mg once daily  Resume atorvastatin if no contraindication.      Contact via [employer] Teams during business hours  To reach covering provider access AMION via sunrise tools  For Urgent matters/after-hours/weekends/holidays please page endocrine fellow on call   For nonurgent matters please email [email or web address]    Please note that this patient may be followed by different provider tomorrow.  Notify endocrine 24 hours prior to discharge for final recommendations

## 2025-06-13 ENCOUNTER — TRANSCRIPTION ENCOUNTER (OUTPATIENT)
Age: 65
End: 2025-06-13

## 2025-06-13 VITALS
SYSTOLIC BLOOD PRESSURE: 137 MMHG | DIASTOLIC BLOOD PRESSURE: 68 MMHG | RESPIRATION RATE: 18 BRPM | OXYGEN SATURATION: 95 % | TEMPERATURE: 98 F | HEART RATE: 75 BPM

## 2025-06-13 LAB
ANION GAP SERPL CALC-SCNC: 20 MMOL/L — HIGH (ref 5–17)
BUN SERPL-MCNC: 41 MG/DL — HIGH (ref 7–23)
CALCIUM SERPL-MCNC: 7.3 MG/DL — LOW (ref 8.4–10.5)
CHLORIDE SERPL-SCNC: 93 MMOL/L — LOW (ref 96–108)
CO2 SERPL-SCNC: 20 MMOL/L — LOW (ref 22–31)
CREAT SERPL-MCNC: 7.73 MG/DL — HIGH (ref 0.5–1.3)
EGFR: 7 ML/MIN/1.73M2 — LOW
EGFR: 7 ML/MIN/1.73M2 — LOW
GLUCOSE BLDC GLUCOMTR-MCNC: 148 MG/DL — HIGH (ref 70–99)
GLUCOSE BLDC GLUCOMTR-MCNC: 168 MG/DL — HIGH (ref 70–99)
GLUCOSE BLDC GLUCOMTR-MCNC: 285 MG/DL — HIGH (ref 70–99)
GLUCOSE SERPL-MCNC: 169 MG/DL — HIGH (ref 70–99)
HCT VFR BLD CALC: 33.4 % — LOW (ref 39–50)
HGB BLD-MCNC: 10.3 G/DL — LOW (ref 13–17)
MAGNESIUM SERPL-MCNC: 2.2 MG/DL — SIGNIFICANT CHANGE UP (ref 1.6–2.6)
MCHC RBC-ENTMCNC: 28.9 PG — SIGNIFICANT CHANGE UP (ref 27–34)
MCHC RBC-ENTMCNC: 30.8 G/DL — LOW (ref 32–36)
MCV RBC AUTO: 93.6 FL — SIGNIFICANT CHANGE UP (ref 80–100)
NRBC BLD AUTO-RTO: 0 /100 WBCS — SIGNIFICANT CHANGE UP (ref 0–0)
PHOSPHATE SERPL-MCNC: 5.2 MG/DL — HIGH (ref 2.5–4.5)
PLATELET # BLD AUTO: 146 K/UL — LOW (ref 150–400)
POTASSIUM SERPL-MCNC: 4.2 MMOL/L — SIGNIFICANT CHANGE UP (ref 3.5–5.3)
POTASSIUM SERPL-SCNC: 4.2 MMOL/L — SIGNIFICANT CHANGE UP (ref 3.5–5.3)
RBC # BLD: 3.57 M/UL — LOW (ref 4.2–5.8)
RBC # FLD: 15.1 % — HIGH (ref 10.3–14.5)
SODIUM SERPL-SCNC: 133 MMOL/L — LOW (ref 135–145)
WBC # BLD: 7.44 K/UL — SIGNIFICANT CHANGE UP (ref 3.8–10.5)
WBC # FLD AUTO: 7.44 K/UL — SIGNIFICANT CHANGE UP (ref 3.8–10.5)

## 2025-06-13 RX ORDER — AMOXICILLIN AND CLAVULANATE POTASSIUM 500; 125 MG/1; MG/1
1 TABLET, FILM COATED ORAL
Qty: 6 | Refills: 0
Start: 2025-06-13 | End: 2025-06-15

## 2025-06-13 RX ORDER — INSULIN LISPRO 100 U/ML
4 INJECTION, SOLUTION INTRAVENOUS; SUBCUTANEOUS
Qty: 0 | Refills: 0 | DISCHARGE

## 2025-06-13 RX ORDER — INSULIN GLARGINE-YFGN 100 [IU]/ML
8 INJECTION, SOLUTION SUBCUTANEOUS
Qty: 1 | Refills: 0
Start: 2025-06-13

## 2025-06-13 RX ORDER — AMOXICILLIN AND CLAVULANATE POTASSIUM 500; 125 MG/1; MG/1
1 TABLET, FILM COATED ORAL
Qty: 2 | Refills: 0
Start: 2025-06-13 | End: 2025-06-13

## 2025-06-13 RX ORDER — FOLIC ACID 1 MG/1
1 TABLET ORAL
Qty: 0 | Refills: 0 | DISCHARGE

## 2025-06-13 RX ADMIN — DEXTROMETHORPHAN HBR, GUAIFENESIN 600 MILLIGRAM(S): 200 LIQUID ORAL at 05:19

## 2025-06-13 RX ADMIN — Medication 100 MILLIGRAM(S): at 13:16

## 2025-06-13 RX ADMIN — FOLIC ACID 1 MILLIGRAM(S): 1 TABLET ORAL at 11:29

## 2025-06-13 RX ADMIN — Medication 25 GRAM(S): at 11:29

## 2025-06-13 RX ADMIN — INSULIN LISPRO 3: 100 INJECTION, SOLUTION INTRAVENOUS; SUBCUTANEOUS at 12:53

## 2025-06-13 RX ADMIN — Medication 81 MILLIGRAM(S): at 11:29

## 2025-06-13 RX ADMIN — CLOPIDOGREL BISULFATE 75 MILLIGRAM(S): 75 TABLET, FILM COATED ORAL at 11:29

## 2025-06-13 RX ADMIN — SERTRALINE 25 MILLIGRAM(S): 100 TABLET, FILM COATED ORAL at 11:29

## 2025-06-13 RX ADMIN — Medication 100 MILLIGRAM(S): at 11:29

## 2025-06-13 RX ADMIN — GABAPENTIN 300 MILLIGRAM(S): 400 CAPSULE ORAL at 05:20

## 2025-06-13 RX ADMIN — Medication 100 MILLIGRAM(S): at 05:19

## 2025-06-13 RX ADMIN — HEPARIN SODIUM 5000 UNIT(S): 1000 INJECTION INTRAVENOUS; SUBCUTANEOUS at 09:36

## 2025-06-13 RX ADMIN — CARVEDILOL 6.25 MILLIGRAM(S): 3.12 TABLET, FILM COATED ORAL at 05:21

## 2025-06-13 RX ADMIN — Medication 500 MILLIGRAM(S): at 11:29

## 2025-06-13 RX ADMIN — Medication 40 MILLIGRAM(S): at 05:20

## 2025-06-13 RX ADMIN — Medication 25 GRAM(S): at 00:48

## 2025-06-13 NOTE — PROGRESS NOTE ADULT - PROBLEM SELECTOR PLAN 3
ESRD on MWF HD    Plan:  -Nephrology consult for inpatient HD Poorly controlled DM2, A1c reported >10 and patient with poor adherence per wife. Experienced abrupt change in mental status in the ED with labs notable for hypoglycemia, improved after 2 amps of D50  - cortisol 30.2, ACTS 32.7, C peptide1.3  - now resolved, start basal/bolus  Plan:  -monitor FS Q4 hrs  -f/u HbA1c- 12%  - endo c/s, recommend CAIN

## 2025-06-13 NOTE — PROGRESS NOTE ADULT - ATTENDING COMMENTS
65M w/ hx of ESRD on HD M, W, F, CAD s/p 2 stents, IDDM2 on [place], HTN, HLD, gout, BPH p/w acute metabolic encephalopathy from dialysis. As per wife, pt has been having URI symptoms for past 3 days or so. Pt working initially. At baseline tends to sleep a lot after eating. Sent to ER from dialysis for further evaluation for lethargy. Admitted for dialysis. Had RRT with worsened AMS also with hypoxia and hypoglycemia improved with D50. Patient seems to have 2nd MRN [id number]. Wife endorses multiple admissions in past for AMS related to fevers and infection. Also note hx of SDH in 2022 possibly related to fall. Appreciate MICU recommendations, MICU rejected, not Emergent dialysis candidate on admission. Some degree of medication and life-style non-compliance at baseline.    #AMS likely toxic metabolic in setting of hypoglycemia, elevated BUN, possible sepsis    CT chest Small bilateral pleural effusions. Bilateral lower lobe opacities favored to represent atelectasis, but consider infectious etiology in the appropriate clinical scenario. Partially imaged gallbladder with gallstones and possible gallbladder wall edema; recommend correlation with gallbladder ultrasound to more accurately characterize.    CT head No acute intracranial hemorrhage, mass effect, or shift of the midline structures. Small age-indeterminate lacunar infarct in the left gangliocapsular junction. Mild chronic white matter microvascular type changes.    - RVP + parainfl  - off bipap, > NC 3L > RA  - on zosyn , finish 7day empiric abx course with augmentin  - Frequent FS Q4hrs, hypoglycemia protocol prn - f/u insulin, c peptide, cortisol unremarkable  - f/u nephro for HD   - f/u blood cx ngtd, urine cx  - A1C 12.4 > appreciate endo recs  - abd US - Cholelithiasis without acute cholecystitis  - tte  - complete med recs and resume home meds as appropriate   - monitor cbc, bmp, blood gas, frequent vital signs, neuro checks  - trend troponin. Repeat EKG. check probnp. check echo  - urine legionella + strep pn ag neg  - DVT PPX. fall + aspiration precautions  - diarrhea - check gi pcr, c diff if meets criteria  - PT rec ANGELINE  - discharge planning, patient refusing ANGELINE, wants home, f/u CM

## 2025-06-13 NOTE — DISCHARGE NOTE NURSING/CASE MANAGEMENT/SOCIAL WORK - FINANCIAL ASSISTANCE
[facility] provides services at a reduced cost to those who are determined to be eligible through [facility]’s financial assistance program. Information regarding [facility]’s financial assistance program can be found by going to [email or web address] or by calling [phone].

## 2025-06-13 NOTE — PROGRESS NOTE ADULT - PROBLEM SELECTOR PLAN 1
- Change in mental status in the ED with labs notable for uremia iso missed HD, and fluid overload  - RVP pos for parainfluenza virus  - CT chest with lower lobe opacities c/w atelectasis vs infection  - CXR on admission with pulmonary edema    Plan:  - satting well on NC  - c/w Zosyn  -monitor on

## 2025-06-13 NOTE — PROGRESS NOTE ADULT - SUBJECTIVE AND OBJECTIVE BOX
Date of Service: 06-13-25 @ 10:06           CARDIOLOGY     PROGRESS  NOTE   ________________________________________________  CHIEF COMPLAINT:Patient is a 65y old  Male who presents with a chief complaint of Metabolic encephalopathy (13 Jun 2025 07:32)  no complain  	  REVIEW OF SYSTEMS:  CONSTITUTIONAL: No fever, weight loss, or fatigue  EYES: No eye pain, visual disturbances, or discharge  ENT:  No difficulty hearing, tinnitus, vertigo; No sinus or throat pain  NECK: No pain or stiffness  RESPIRATORY: No cough, wheezing, chills or hemoptysis; No Shortness of Breath  CARDIOVASCULAR: No chest pain, palpitations, passing out, dizziness, or leg swelling  GASTROINTESTINAL: No abdominal or epigastric pain. No nausea, vomiting, or hematemesis; No diarrhea or constipation. No melena or hematochezia.  GENITOURINARY: No dysuria, frequency, hematuria, or incontinence  NEUROLOGICAL: No headaches, memory loss, loss of strength, numbness, or tremors  SKIN: No itching, burning, rashes, or lesions   LYMPH Nodes: No enlarged glands  ENDOCRINE: No heat or cold intolerance; No hair loss  MUSCULOSKELETAL: No joint pain or swelling; No muscle, back, or extremity pain  PSYCHIATRIC: No depression, anxiety, mood swings, or difficulty sleeping  HEME/LYMPH: No easy bruising, or bleeding gums  ALLERGY AND IMMUNOLOGIC: No hives or eczema	    [ x] All others negative	  [ ] Unable to obtain    PHYSICAL EXAM:  T(C): 36.8 (06-13-25 @ 04:48), Max: 36.9 (06-12-25 @ 20:10)  HR: 75 (06-13-25 @ 04:48) (75 - 87)  BP: 137/68 (06-13-25 @ 04:48) (132/57 - 165/85)  RR: 18 (06-13-25 @ 04:48) (18 - 20)  SpO2: 95% (06-13-25 @ 04:48) (91% - 99%)  Wt(kg): --  I&O's Summary    12 Jun 2025 07:01  -  13 Jun 2025 07:00  --------------------------------------------------------  IN: 360 mL / OUT: 1000 mL / NET: -640 mL        Appearance: Normal	  HEENT:   Normal oral mucosa, PERRL, EOMI	  Lymphatic: No lymphadenopathy  Cardiovascular: Normal S1 S2, No JVD, + murmurs, No edema  Respiratory: Lungs clear to auscultation	  Psychiatry: A & O x 3, Mood & affect appropriate  Gastrointestinal:  Soft, Non-tender, + BS	  Skin: No rashes, No ecchymoses, No cyanosis	  Neurologic: Non-focal  Extremities: , No clubbing, cyanosis or edema  Vascular: Peripheral pulses palpable 2+ bilaterally    MEDICATIONS  (STANDING):  allopurinol 100 milliGRAM(s) Oral daily  ascorbic acid 500 milliGRAM(s) Oral daily  aspirin  chewable 81 milliGRAM(s) Oral daily  atorvastatin 80 milliGRAM(s) Oral at bedtime  benzonatate 100 milliGRAM(s) Oral three times a day  carvedilol 6.25 milliGRAM(s) Oral every 12 hours  chlorhexidine 2% Cloths 1 Application(s) Topical <User Schedule>  clopidogrel Tablet 75 milliGRAM(s) Oral daily  dextrose 5%. 1000 milliLiter(s) (100 mL/Hr) IV Continuous <Continuous>  dextrose 5%. 1000 milliLiter(s) (50 mL/Hr) IV Continuous <Continuous>  dextrose 50% Injectable 25 Gram(s) IV Push once  dextrose 50% Injectable 12.5 Gram(s) IV Push once  dextrose 50% Injectable 25 Gram(s) IV Push once  folic acid 1 milliGRAM(s) Oral daily  gabapentin 300 milliGRAM(s) Oral two times a day  glucagon  Injectable 1 milliGRAM(s) IntraMuscular once  guaiFENesin  milliGRAM(s) Oral every 12 hours  heparin   Injectable 5000 Unit(s) SubCutaneous every 12 hours  insulin lispro (ADMELOG) corrective regimen sliding scale   SubCutaneous three times a day before meals  insulin lispro (ADMELOG) corrective regimen sliding scale   SubCutaneous <User Schedule>  melatonin 3 milliGRAM(s) Oral at bedtime  piperacillin/tazobactam IVPB.. 3.375 Gram(s) IV Intermittent every 12 hours  sertraline 25 milliGRAM(s) Oral daily  valsartan 40 milliGRAM(s) Oral daily      TELEMETRY: 	    ECG:  	  RADIOLOGY:  OTHER: 	  	  LABS:	 	    CARDIAC MARKERS:                        10.3   7.44  )-----------( 146      ( 13 Jun 2025 07:08 )             33.4     06-13    133[L]  |  93[L]  |  41[H]  ----------------------------<  169[H]  4.2   |  20[L]  |  7.73[H]    Ca    7.3[L]      13 Jun 2025 07:08  Phos  5.2     06-13  Mg     2.2     06-13    TPro  5.5[L]  /  Alb  2.9[L]  /  TBili  0.4  /  DBili  x   /  AST  22  /  ALT  19  /  AlkPhos  76  06-12    proBNP:   Lipid Profile:   HgA1c:   TSH: Thyroid Stimulating Hormone, Serum: 2.26 uIU/mL (06-10 @ 03:48)    Respiratory Viral Panel with COVID-19 by ASYA (06.09.25 @ 21:58)    Rapid RVP Result: Detected   SARS-CoV-2: [facility]: This Respiratory Panel uses polymerase chain reaction (PCR) to detect for  adenovirus; coronavirus (HKU1, NL63, 229E, OC43); human metapneumovirus  (hMPV); human enterovirus/rhinovirus (Entero/RV); influenza A; influenza  A/H1; influenza A/H3; influenza A/H1-2009; influenza B; parainfluenza  viruses 1, 2, 3, 4; respiratory syncytial virus; Mycoplasma pneumoniae;  Chlamydophila pneumoniae; and SARS-CoV-2.   Parainfluenza 3 (RapRVP): Detected          Assessment and plan  ---------------------------  64 yo M with a history of CHF, T2DM, ESRD on HD presented today after missing HD today. In the ED, had an RRT called for a change in mental status and presents with respiratory symptoms and fever  Labs notable for glucose 42, receiving 2 amps D50. Patient HDS, afebrile, on NRB now weaned off. Patient had improved responsiveness 5 minutes later (AOx2), able to move all extremities. BG on recheck > 300. Blood cx collected. Patient likely with underlying metabolic encephalopathy in the setting of uremia BUN 87, worsened by hypoglycemic episode.   pt is well known to me sherron sig cardiac hx s/p multiple stents, esrd on HD , DM with change of mental status  medical record is new should be change to the old pne to retreat all the records  continue all cardiac meds  spoke to the wife  agree with hd with increase fluid withrawal as tolertaed sec to abnormal chest x ray with fluid overload  will review  office records  pt with sig cardiac hx , start on asa daily, need to get meds record from the wife  echo to evaluate EF  dvt prophylaxis, follow blood sugar level closely  medication noted, continue current meds  echo, pt with hx of ischemic cardiomyopathy  RVP +  physical therapy  doing better, continue current meds  mental status as his baseline

## 2025-06-13 NOTE — DISCHARGE NOTE NURSING/CASE MANAGEMENT/SOCIAL WORK - PATIENT PORTAL LINK FT
You can access the FollowMyHealth Patient Portal offered by [facility] by registering at the following website: [email or web address]. By joining [employer]’s FollowMyHealth portal, you will also be able to view your health information using other applications (apps) compatible with our system.

## 2025-06-13 NOTE — PROGRESS NOTE ADULT - ASSESSMENT
65-year-old male with history of CHF, type 2 diabetes, end-stage renal disease, presented after missing dialysis, patient had mental status change, had a rapid response and noted to have respiratory symptoms and fever.  Labs notable for hyperglycemia.  Endocrinology consulted for management of poorly controlled type 2 diabetes with hypoglycemia. BG Goal 100-180mg/dl   Tolerating POs, eats full meals.  Over the past 24 hour BGs 148-250 with fasting . Currently off all standing insulin and was off D5w yesterday. No hypoglycemia noted. Discharge planning today         1.  Type 2 diabetes  A1C:A1C with Estimated Average Glucose Result: 12.4 % (06-10-25 @ 07:35)  A1C with Estimated Average Glucose Result: 12.8 % (06-10-25 @ 01:06)  Home regimen: Treseiba 24 units and Humalog 12 unit TID with sliding scale   Endocrinologist:Dr Maher   eGFR: 5 (06.12.25 @ 09:05), on HD     Plan   Test BGs ACTID and at HS  Continue with low does sliding scale( (1:50 above 150mg/dl))ACTID and seperate low correction scale at HS and 2 am.   Start Admelog 4 units with meals ( Hold if NPO or not eating )   Continue to hold Lantus for now ( will restart fasting BG > 180 X2 )  Continue consistent carbohydrate diet   Please keep hypoglycemia protocol in place     Discharge recommendation/considerations:  Discharge home today as per team   Discharge on Humalog 4 units with meals ( Hold if not eating )   Continue to hold Toujeo for now, restart Toujeo 8 units q 4PM if fasting BGs > 180 X 2 days   patient should check FSBG premeals and bedtime. Patient should call their doctor when FSBG <70 or above >400 and or consistently above 200s as changes in the regimen will have to be made.   Please make sure patient has all DM management supplies   Please send Rx for Glucose tabs, Baqsimi nasal spray or glucagon emergency kit for hypoglycemia risk   [facility] follow up with [facility] with Dr. Maher  ([phone]) at [address] [place] [address] [address], [place] [address]   Pending patient assistance to get [facility] outpatient   Routine follow-up with Ophthalmology and Podiatry       # Hypertension  BP goal <130/80  Currently on Valsartan and carvedilol  Patient currently on hemodialysis, appreciate recommendation by nephrology team and dialysis team    # Hyperlipidemia  LDL goal <70 mg/dL  At home patient is on atorvastatin 80 mg once daily  Resume atorvastatin if no contraindication.      Contact via [employer] Teams during business hours  To reach covering provider access AMION via sunrise tools  For Urgent matters/after-hours/weekends/holidays please page endocrine fellow on call   For nonurgent matters please email [email or web address]    Please note that this patient may be followed by different provider tomorrow.  Notify endocrine 24 hours prior to discharge for final recommendations

## 2025-06-13 NOTE — PROGRESS NOTE ADULT - PROBLEM SELECTOR PLAN 2
Poorly controlled DM2, A1c reported >10 and patient with poor adherence per wife. Experienced abrupt change in mental status in the ED with labs notable for hypoglycemia, improved after 2 amps of D50  - cortisol 30.2, ACTS 32.7, C peptide1.3  - now resolved, start basal/bolus  Plan:  -monitor FS Q4 hrs  -f/u HbA1c- 12%  - endo c/s, recommend CAIN - Hba1c 12.4%  - previously hypoglymic, now resolved  - Start lantus 8U qhs, admelog 2U TID

## 2025-06-13 NOTE — PROGRESS NOTE ADULT - NUTRITIONAL ASSESSMENT
Diet, Renal Restrictions:   For patients receiving Renal Replacement - No Protein Restr, No Conc K, No Conc Phos, Low Sodium  Consistent Carbohydrate {No Snacks} (CSTCHO) (06-12-25 @ 16:02) [Active]
Diet, Renal Restrictions:   For patients receiving Renal Replacement - No Protein Restr, No Conc K, No Conc Phos, Low Sodium (06-11-25 @ 09:05) [Active]

## 2025-06-13 NOTE — PROGRESS NOTE ADULT - REASON FOR ADMISSION
Metabolic encephalopathy
oral

## 2025-06-13 NOTE — PROGRESS NOTE ADULT - TIME BILLING
Time-based billing (NON-critical care).     The necessity of the time spent during the encounter on this date of service was due to:     - Ordering, reviewing, and interpreting labs, testing, and imaging.  - Independently obtaining a review of systems and performing a physical exam  - Reviewing prior hospitalization and where necessary, outpatient records.  - Counselling and educating patient and family regarding interpretation of aforementioned items and plan of care.  - I have spent 55 minutes of time on the encounter which excludes teaching and separately reported services.
discharge planning 65mins    Time-based billing (NON-critical care).     The necessity of the time spent during the encounter on this date of service was due to:     - Ordering, reviewing, and interpreting labs, testing, and imaging.  - Independently obtaining a review of systems and performing a physical exam  - Reviewing prior hospitalization and where necessary, outpatient records.  - Counselling and educating patient and family regarding interpretation of aforementioned items and plan of care.  - I have spent 65 minutes of time on the encounter which excludes teaching and separately reported services.

## 2025-06-13 NOTE — PROGRESS NOTE ADULT - PROBLEM SELECTOR PLAN 4
Patient A&Ox3 at baseline, in ED had change in mental status with hypoxemia, labs notable for uremia iso missed HD and hypoglycemia. VBG without evidence of hypercapnia. CXR wnl without evidence of PNA    Note alternate MRN : [id number]    Plan:  -f/u blood cx ngtd , expanded RVP- Parainfluenza  -Empiric zosyn for now 6/10-  - f/u B12, folate, TSH for reversible etiologies of encephalopathy  - f/u CT head non cont, CT chest non cont  -Monitor glucose levels  -Nephrology consult in AM for HD setup inpatient    RESOLVED ESRD on MWF HD    Plan:  -Nephrology consult for inpatient HD

## 2025-06-13 NOTE — PROGRESS NOTE ADULT - PROBLEM SELECTOR PLAN 5
Diet: renal diet  DVT ppx: heparin subq  Dispo: [facility]INE  NOTE ALTERNATE MRN [id number] Patient A&Ox3 at baseline, in ED had change in mental status with hypoxemia, labs notable for uremia iso missed HD and hypoglycemia. VBG without evidence of hypercapnia. CXR wnl without evidence of PNA    Note alternate MRN : [id number]    Plan:  -f/u blood cx ngtd , expanded RVP- Parainfluenza  -Empiric zosyn for now 6/10-  - f/u B12, folate, TSH for reversible etiologies of encephalopathy  - f/u CT head non cont, CT chest non cont  -Monitor glucose levels  -Nephrology consult in AM for HD setup inpatient    RESOLVED

## 2025-06-13 NOTE — PROGRESS NOTE ADULT - PROVIDER SPECIALTY LIST ADULT
Nephrology
Cardiology
Nephrology
Cardiology
Cardiology
Internal Medicine
Nephrology
Endocrinology
Endocrinology
Internal Medicine

## 2025-06-13 NOTE — PROGRESS NOTE ADULT - SUBJECTIVE AND OBJECTIVE BOX
PROGRESS NOTE:     Patient is a 65y old  Male who presents with a chief complaint of Metabolic encephalopathy (12 Jun 2025 15:23)      SUBJECTIVE / OVERNIGHT EVENTS:    OVERNIGHT: No acute overnight events.      Patient was examined at bedside and feels well this morning. Denies fever, chills, chest pain, SOB, nausea, vomiting. ROS otherwise negative and pt is amenable to current treatment plan.      REVIEW OF SYSTEMS:    CONSTITUTIONAL:  No weakness, fevers, or chills  EYES/ENT:  No visual changes, vertigo, or throat pain   NECK:  No pain or stiffness  RESPIRATORY:  No SOB, cough, wheezing, or hemoptysis  CARDIOVASCULAR:  No chest pain or palpitations  GASTROINTESTINAL:  No abdominal pain, nausea, vomiting, or hematemesis; no diarrhea, constipation, melena, or hematochezia  GENITOURINARY:  No dysuria, polyuria, or hematuria  NEUROLOGICAL:  No numbness or weakness  SKIN:  No itching or rashes      MEDICATIONS  (STANDING):  allopurinol 100 milliGRAM(s) Oral daily  ascorbic acid 500 milliGRAM(s) Oral daily  aspirin  chewable 81 milliGRAM(s) Oral daily  atorvastatin 80 milliGRAM(s) Oral at bedtime  benzonatate 100 milliGRAM(s) Oral three times a day  carvedilol 6.25 milliGRAM(s) Oral every 12 hours  chlorhexidine 2% Cloths 1 Application(s) Topical <User Schedule>  clopidogrel Tablet 75 milliGRAM(s) Oral daily  dextrose 5%. 1000 milliLiter(s) (100 mL/Hr) IV Continuous <Continuous>  dextrose 5%. 1000 milliLiter(s) (50 mL/Hr) IV Continuous <Continuous>  dextrose 50% Injectable 25 Gram(s) IV Push once  dextrose 50% Injectable 12.5 Gram(s) IV Push once  dextrose 50% Injectable 25 Gram(s) IV Push once  folic acid 1 milliGRAM(s) Oral daily  gabapentin 300 milliGRAM(s) Oral two times a day  glucagon  Injectable 1 milliGRAM(s) IntraMuscular once  guaiFENesin  milliGRAM(s) Oral every 12 hours  heparin   Injectable 5000 Unit(s) SubCutaneous every 12 hours  insulin lispro (ADMELOG) corrective regimen sliding scale   SubCutaneous three times a day before meals  insulin lispro (ADMELOG) corrective regimen sliding scale   SubCutaneous <User Schedule>  melatonin 3 milliGRAM(s) Oral at bedtime  piperacillin/tazobactam IVPB.. 3.375 Gram(s) IV Intermittent every 12 hours  sertraline 25 milliGRAM(s) Oral daily  valsartan 40 milliGRAM(s) Oral daily    MEDICATIONS  (PRN):  acetaminophen     Tablet .. 650 milliGRAM(s) Oral every 6 hours PRN Temp greater or equal to 38C (100.4F), Mild Pain (1 - 3)  dextrose Oral Gel 15 Gram(s) Oral once PRN Blood Glucose LESS THAN 70 milliGRAM(s)/deciliter      CAPILLARY BLOOD GLUCOSE      POCT Blood Glucose.: 168 mg/dL (13 Jun 2025 02:46)  POCT Blood Glucose.: 250 mg/dL (12 Jun 2025 21:27)  POCT Blood Glucose.: 184 mg/dL (12 Jun 2025 17:47)  POCT Blood Glucose.: 174 mg/dL (12 Jun 2025 13:07)  POCT Blood Glucose.: 104 mg/dL (12 Jun 2025 07:46)    I&O's Summary    12 Jun 2025 07:01  -  13 Jun 2025 07:00  --------------------------------------------------------  IN: 360 mL / OUT: 1000 mL / NET: -640 mL        PHYSICAL EXAM:  Vital Signs Last 24 Hrs  T(C): 36.8 (13 Jun 2025 04:48), Max: 36.9 (12 Jun 2025 20:10)  T(F): 98.3 (13 Jun 2025 04:48), Max: 98.5 (12 Jun 2025 23:34)  HR: 75 (13 Jun 2025 04:48) (72 - 87)  BP: 137/68 (13 Jun 2025 04:48) (97/62 - 165/85)  BP(mean): --  RR: 18 (13 Jun 2025 04:48) (18 - 20)  SpO2: 95% (13 Jun 2025 04:48) (91% - 100%)    Parameters below as of 13 Jun 2025 04:48  Patient On (Oxygen Delivery Method): room air        CONSTITUTIONAL: NAD; well-developed  HEENT: PERRL, clear conjunctiva  RESPIRATORY: Normal respiratory effort; lungs are clear to auscultation bilaterally; No crackles/rhonchi/wheezing  CARDIOVASCULAR: Regular rate and rhythm, normal S1 and S2, no murmur/rub/gallop; No lower extremity edema; Peripheral pulses are 2+ bilaterally  ABDOMEN: Nontender to palpation, normoactive bowel sounds, no rebound/guarding; No hepatosplenomegaly  MUSCULOSKELETAL: No clubbing or cyanosis of digits; no joint swelling or tenderness to palpation  EXTREMITY: Lower extremities non-tender to palpation; non-erythematous B/L  NEURO: A&Ox3; no focal deficits   PSYCH: Normal mood; affect appropriate    LABS:                        10.0   8.29  )-----------( 144      ( 12 Jun 2025 09:05 )             31.0     06-12    133[L]  |  94[L]  |  61[H]  ----------------------------<  94  4.1   |  23  |  10.47[H]    Ca    7.0[L]      12 Jun 2025 09:05  Phos  6.1     06-12  Mg     2.2     06-12    TPro  5.5[L]  /  Alb  2.9[L]  /  TBili  0.4  /  DBili  x   /  AST  22  /  ALT  19  /  AlkPhos  76  06-12          Urinalysis Basic - ( 12 Jun 2025 09:05 )    Color: x / Appearance: x / SG: x / pH: x  Gluc: 94 mg/dL / Ketone: x  / Bili: x / Urobili: x   Blood: x / Protein: x / Nitrite: x   Leuk Esterase: x / RBC: x / WBC x   Sq Epi: x / Non Sq Epi: x / Bacteria: x          RADIOLOGY & ADDITIONAL TESTS:    N/A PROGRESS NOTE:     Patient is a 65y old  Male who presents with a chief complaint of Metabolic encephalopathy (12 Jun 2025 15:23)      SUBJECTIVE / OVERNIGHT EVENTS:    OVERNIGHT: 8 loose, watery BMs.       Patient was examined at bedside and feels well this morning, complaining of cough.       REVIEW OF SYSTEMS:    CONSTITUTIONAL:  No weakness, fevers, or chills  EYES/ENT:  No visual changes, vertigo, or throat pain   NECK:  No pain or stiffness  RESPIRATORY:  No SOB, +cough, wheezing, or hemoptysis  CARDIOVASCULAR:  No chest pain or palpitations  GASTROINTESTINAL:  No abdominal pain, nausea, vomiting, or hematemesis; +diarrhea, constipation, melena, or hematochezia  GENITOURINARY:  No dysuria, polyuria, or hematuria  NEUROLOGICAL:  No numbness or weakness  SKIN:  No itching or rashes      MEDICATIONS  (STANDING):  allopurinol 100 milliGRAM(s) Oral daily  ascorbic acid 500 milliGRAM(s) Oral daily  aspirin  chewable 81 milliGRAM(s) Oral daily  atorvastatin 80 milliGRAM(s) Oral at bedtime  benzonatate 100 milliGRAM(s) Oral three times a day  carvedilol 6.25 milliGRAM(s) Oral every 12 hours  chlorhexidine 2% Cloths 1 Application(s) Topical <User Schedule>  clopidogrel Tablet 75 milliGRAM(s) Oral daily  dextrose 5%. 1000 milliLiter(s) (100 mL/Hr) IV Continuous <Continuous>  dextrose 5%. 1000 milliLiter(s) (50 mL/Hr) IV Continuous <Continuous>  dextrose 50% Injectable 25 Gram(s) IV Push once  dextrose 50% Injectable 12.5 Gram(s) IV Push once  dextrose 50% Injectable 25 Gram(s) IV Push once  folic acid 1 milliGRAM(s) Oral daily  gabapentin 300 milliGRAM(s) Oral two times a day  glucagon  Injectable 1 milliGRAM(s) IntraMuscular once  guaiFENesin  milliGRAM(s) Oral every 12 hours  heparin   Injectable 5000 Unit(s) SubCutaneous every 12 hours  insulin lispro (ADMELOG) corrective regimen sliding scale   SubCutaneous three times a day before meals  insulin lispro (ADMELOG) corrective regimen sliding scale   SubCutaneous <User Schedule>  melatonin 3 milliGRAM(s) Oral at bedtime  piperacillin/tazobactam IVPB.. 3.375 Gram(s) IV Intermittent every 12 hours  sertraline 25 milliGRAM(s) Oral daily  valsartan 40 milliGRAM(s) Oral daily    MEDICATIONS  (PRN):  acetaminophen     Tablet .. 650 milliGRAM(s) Oral every 6 hours PRN Temp greater or equal to 38C (100.4F), Mild Pain (1 - 3)  dextrose Oral Gel 15 Gram(s) Oral once PRN Blood Glucose LESS THAN 70 milliGRAM(s)/deciliter      CAPILLARY BLOOD GLUCOSE      POCT Blood Glucose.: 168 mg/dL (13 Jun 2025 02:46)  POCT Blood Glucose.: 250 mg/dL (12 Jun 2025 21:27)  POCT Blood Glucose.: 184 mg/dL (12 Jun 2025 17:47)  POCT Blood Glucose.: 174 mg/dL (12 Jun 2025 13:07)  POCT Blood Glucose.: 104 mg/dL (12 Jun 2025 07:46)    I&O's Summary    12 Jun 2025 07:01  -  13 Jun 2025 07:00  --------------------------------------------------------  IN: 360 mL / OUT: 1000 mL / NET: -640 mL        PHYSICAL EXAM:  Vital Signs Last 24 Hrs  T(C): 36.8 (13 Jun 2025 04:48), Max: 36.9 (12 Jun 2025 20:10)  T(F): 98.3 (13 Jun 2025 04:48), Max: 98.5 (12 Jun 2025 23:34)  HR: 75 (13 Jun 2025 04:48) (72 - 87)  BP: 137/68 (13 Jun 2025 04:48) (97/62 - 165/85)  BP(mean): --  RR: 18 (13 Jun 2025 04:48) (18 - 20)  SpO2: 95% (13 Jun 2025 04:48) (91% - 100%)    Parameters below as of 13 Jun 2025 04:48  Patient On (Oxygen Delivery Method): room air        CONSTITUTIONAL: NAD; well-developed  HEENT: PERRL, clear conjunctiva  RESPIRATORY: Normal respiratory effort; lungs are clear to auscultation bilaterally; No crackles/rhonchi/wheezing  CARDIOVASCULAR: Regular rate and rhythm, normal S1 and S2, no murmur/rub/gallop; No lower extremity edema; Peripheral pulses are 2+ bilaterally  ABDOMEN: Nontender to palpation, normoactive bowel sounds, no rebound/guarding; No hepatosplenomegaly  MUSCULOSKELETAL: No clubbing or cyanosis of digits; no joint swelling or tenderness to palpation  EXTREMITY: Lower extremities non-tender to palpation; non-erythematous B/L  NEURO: A&Ox3; no focal deficits   PSYCH: Normal mood; affect appropriate    LABS:                        10.0   8.29  )-----------( 144      ( 12 Jun 2025 09:05 )             31.0     06-12    133[L]  |  94[L]  |  61[H]  ----------------------------<  94  4.1   |  23  |  10.47[H]    Ca    7.0[L]      12 Jun 2025 09:05  Phos  6.1     06-12  Mg     2.2     06-12    TPro  5.5[L]  /  Alb  2.9[L]  /  TBili  0.4  /  DBili  x   /  AST  22  /  ALT  19  /  AlkPhos  76  06-12          Urinalysis Basic - ( 12 Jun 2025 09:05 )    Color: x / Appearance: x / SG: x / pH: x  Gluc: 94 mg/dL / Ketone: x  / Bili: x / Urobili: x   Blood: x / Protein: x / Nitrite: x   Leuk Esterase: x / RBC: x / WBC x   Sq Epi: x / Non Sq Epi: x / Bacteria: x          RADIOLOGY & ADDITIONAL TESTS:    N/A

## 2025-06-13 NOTE — PROGRESS NOTE ADULT - SUBJECTIVE AND OBJECTIVE BOX
Seen earlier today     Chief Complaint: Diabetes Mellitus follow up    INTERVAL HX: " Feel better" Tolerating POs, eats full meals.  Over the past 24 hour BGs 148-250 with fasting . Currently off all standing insulin and was off D5w yesterday. No hypoglycemia noted. Discharge planning today       Review of Systems:  General: As above  GI: No nausea, vomiting  Endocrine: no  S&Sx of hypoglycemia    Allergies    shellfish (Rash)  Drug Allergies Not Recorded    Intolerances      MEDICATIONS  (STANDING):  allopurinol 100 milliGRAM(s) Oral daily  ascorbic acid 500 milliGRAM(s) Oral daily  aspirin  chewable 81 milliGRAM(s) Oral daily  atorvastatin 80 milliGRAM(s) Oral at bedtime  benzonatate 100 milliGRAM(s) Oral three times a day  carvedilol 6.25 milliGRAM(s) Oral every 12 hours  chlorhexidine 2% Cloths 1 Application(s) Topical <User Schedule>  clopidogrel Tablet 75 milliGRAM(s) Oral daily  dextrose 5%. 1000 milliLiter(s) (100 mL/Hr) IV Continuous <Continuous>  dextrose 5%. 1000 milliLiter(s) (50 mL/Hr) IV Continuous <Continuous>  dextrose 50% Injectable 25 Gram(s) IV Push once  dextrose 50% Injectable 12.5 Gram(s) IV Push once  dextrose 50% Injectable 25 Gram(s) IV Push once  folic acid 1 milliGRAM(s) Oral daily  gabapentin 300 milliGRAM(s) Oral two times a day  glucagon  Injectable 1 milliGRAM(s) IntraMuscular once  guaiFENesin  milliGRAM(s) Oral every 12 hours  heparin   Injectable 5000 Unit(s) SubCutaneous every 12 hours  insulin lispro (ADMELOG) corrective regimen sliding scale   SubCutaneous three times a day before meals  insulin lispro (ADMELOG) corrective regimen sliding scale   SubCutaneous <User Schedule>  melatonin 3 milliGRAM(s) Oral at bedtime  piperacillin/tazobactam IVPB.. 3.375 Gram(s) IV Intermittent every 12 hours  sertraline 25 milliGRAM(s) Oral daily  valsartan 40 milliGRAM(s) Oral daily      allopurinol   100 milliGRAM(s) Oral (06-13-25 @ 11:29)   100 milliGRAM(s) Oral (06-12-25 @ 13:21)    atorvastatin   80 milliGRAM(s) Oral (06-12-25 @ 22:34)    insulin lispro (ADMELOG) corrective regimen sliding scale   1 Unit(s) SubCutaneous (06-12-25 @ 18:13)   1 Unit(s) SubCutaneous (06-12-25 @ 13:20)        PHYSICAL EXAM:  VITALS: T(C): 36.8 (06-13-25 @ 04:48)  T(F): 98.3 (06-13-25 @ 04:48), Max: 98.5 (06-12-25 @ 23:34)  HR: 75 (06-13-25 @ 04:48) (75 - 87)  BP: 137/68 (06-13-25 @ 04:48) (132/57 - 137/68)  RR:  (18 - 20)  SpO2:  (91% - 96%)  Wt(kg): --  GENERAL: male sitting in chair, in NAD  Respiratory: Respirations unlabored, + intermittent cough  Extremities: Warm, no edema  NEURO: Alert , appropriate     LABS:  POCT Blood Glucose.: 148 mg/dL (06-13-25 @ 08:45)  POCT Blood Glucose.: 168 mg/dL (06-13-25 @ 02:46)  POCT Blood Glucose.: 250 mg/dL (06-12-25 @ 21:27)  POCT Blood Glucose.: 184 mg/dL (06-12-25 @ 17:47)  POCT Blood Glucose.: 174 mg/dL (06-12-25 @ 13:07)  POCT Blood Glucose.: 104 mg/dL (06-12-25 @ 07:46)  POCT Blood Glucose.: 133 mg/dL (06-12-25 @ 05:32)  POCT Blood Glucose.: 128 mg/dL (06-11-25 @ 23:36)  POCT Blood Glucose.: 92 mg/dL (06-11-25 @ 22:10)  POCT Blood Glucose.: 130 mg/dL (06-11-25 @ 17:19)  POCT Blood Glucose.: 77 mg/dL (06-11-25 @ 10:04)  POCT Blood Glucose.: 116 mg/dL (06-11-25 @ 06:16)  POCT Blood Glucose.: 63 mg/dL (06-11-25 @ 05:54)  POCT Blood Glucose.: 68 mg/dL (06-11-25 @ 05:43)  POCT Blood Glucose.: 114 mg/dL (06-11-25 @ 02:25)  POCT Blood Glucose.: 61 mg/dL (06-11-25 @ 02:02)  POCT Blood Glucose.: 55 mg/dL (06-11-25 @ 02:00)  POCT Blood Glucose.: 116 mg/dL (06-10-25 @ 22:47)  POCT Blood Glucose.: 51 mg/dL (06-10-25 @ 22:22)  POCT Blood Glucose.: 58 mg/dL (06-10-25 @ 22:07)  POCT Blood Glucose.: 127 mg/dL (06-10-25 @ 17:15)  POCT Blood Glucose.: 68 mg/dL (06-10-25 @ 16:27)  POCT Blood Glucose.: 124 mg/dL (06-10-25 @ 12:21)                          10.3   7.44  )-----------( 146      ( 13 Jun 2025 07:08 )             33.4     06-13    133[L]  |  93[L]  |  41[H]  ----------------------------<  169[H]  4.2   |  20[L]  |  7.73[H]    Ca    7.3[L]      13 Jun 2025 07:08  Phos  5.2     06-13  Mg     2.2     06-13    TPro  5.5[L]  /  Alb  2.9[L]  /  TBili  0.4  /  DBili  x   /  AST  22  /  ALT  19  /  AlkPhos  76  06-12    eGFR: 7 mL/min/1.73m2 (13 Jun 2025 07:08)      Thyroid Function Tests:  06-10 @ 03:48 TSH 2.26 FreeT4 -- T3 -- Anti TPO -- Anti Thyroglobulin Ab -- TSI --      A1C with Estimated Average Glucose Result: 12.4 % (06-10-25 @ 07:35)  A1C with Estimated Average Glucose Result: 12.8 % (06-10-25 @ 01:06)    Estimated Average Glucose: 309 mg/dL (06-10-25 @ 07:35)  Estimated Average Glucose: 321 mg/dL (06-10-25 @ 01:06)        Diet, Renal Restrictions:   For patients receiving Renal Replacement - No Protein Restr, No Conc K, No Conc Phos, Low Sodium  Consistent Carbohydrate No Snacks (CSTCHO) (06-12-25 @ 16:02) [Active]

## 2025-06-13 NOTE — PROGRESS NOTE ADULT - ASSESSMENT
IMPRESSION: 65M w/ DM2, CHF, and ESRD-HD, 6/9/25 p/w parainfluenza bronchopneumonia and hypoglycemia    (1)Renal - ESRD - HD usually MWF; getting HD TTS this week, after having missed HD on Mon 6/9. We can plan to revert to MWF schedule next week.  (2)ID - parainfluenza bronchopnemonia  (3)Hypoglycemia - improved/now off D5W IV    RECOMMEND:  (1)Next HD Saturday 6/14 ==>back to MWF next week  (2)Dose new meds for GFR<10/HD    Addis Bautista, TANI  [facility]  [phone]                    IMPRESSION: 65M w/ DM2, CHF, and ESRD-HD, 6/9/25 p/w parainfluenza bronchopneumonia and hypoglycemia    (1)Renal - ESRD - HD usually MWF; getting HD TTS this week, after having missed HD on Mon 6/9. We can plan to revert to MWF schedule next week.  (2)ID - parainfluenza bronchopnemonia  (3)Hypoglycemia - improved/now off D5W IV    RECOMMEND:  (1)Next HD tomorrow (6/14) ==>back to MWF next week  (2)Dose new meds for GFR<10/HD    Addis Bautista, TANI  [facility]  [phone]

## 2025-06-13 NOTE — PROGRESS NOTE ADULT - SUBJECTIVE AND OBJECTIVE BOX
NEPHROLOGY     Patient seen and examined sitting in chair, no new complaints, in no acute distress.     MEDICATIONS  (STANDING):  allopurinol 100 milliGRAM(s) Oral daily  ascorbic acid 500 milliGRAM(s) Oral daily  aspirin  chewable 81 milliGRAM(s) Oral daily  atorvastatin 80 milliGRAM(s) Oral at bedtime  benzonatate 100 milliGRAM(s) Oral three times a day  carvedilol 6.25 milliGRAM(s) Oral every 12 hours  chlorhexidine 2% Cloths 1 Application(s) Topical <User Schedule>  clopidogrel Tablet 75 milliGRAM(s) Oral daily  dextrose 5%. 1000 milliLiter(s) (100 mL/Hr) IV Continuous <Continuous>  dextrose 5%. 1000 milliLiter(s) (50 mL/Hr) IV Continuous <Continuous>  dextrose 50% Injectable 25 Gram(s) IV Push once  dextrose 50% Injectable 12.5 Gram(s) IV Push once  dextrose 50% Injectable 25 Gram(s) IV Push once  folic acid 1 milliGRAM(s) Oral daily  gabapentin 300 milliGRAM(s) Oral two times a day  glucagon  Injectable 1 milliGRAM(s) IntraMuscular once  guaiFENesin  milliGRAM(s) Oral every 12 hours  heparin   Injectable 5000 Unit(s) SubCutaneous every 12 hours  insulin lispro (ADMELOG) corrective regimen sliding scale   SubCutaneous three times a day before meals  insulin lispro (ADMELOG) corrective regimen sliding scale   SubCutaneous <User Schedule>  melatonin 3 milliGRAM(s) Oral at bedtime  piperacillin/tazobactam IVPB.. 3.375 Gram(s) IV Intermittent every 12 hours  sertraline 25 milliGRAM(s) Oral daily  valsartan 40 milliGRAM(s) Oral daily    VITALS:  T(C): , Max: 36.9 (06-12-25 @ 20:10)  T(F): , Max: 98.5 (06-12-25 @ 23:34)  HR: 75 (06-13-25 @ 04:48)  BP: 137/68 (06-13-25 @ 04:48)  BP(mean): --  RR: 18 (06-13-25 @ 04:48)  SpO2: 95% (06-13-25 @ 04:48)  Wt(kg): --  I and O's:    06-12 @ 07:01  -  06-13 @ 07:00  --------------------------------------------------------  IN: 360 mL / OUT: 1000 mL / NET: -640 mL    PHYSICAL EXAM:    Constitutional: alert, cooperative, nad   Neck: Supple, No JVD  Respiratory: diminished at bases   Cardiovascular: RRR s1s2, no m/r/g  Gastrointestinal: BS+, soft, NT/ND  Extremities: No peripheral edema b/l  Back: no CVAT b/l  Skin: No rashes, no nevi  Access: MARI SCHWARZ (+)thrill    LABS:                        10.3   7.44  )-----------( 146      ( 13 Jun 2025 07:08 )             33.4     06-13    133[L]  |  93[L]  |  41[H]  ----------------------------<  169[H]  4.2   |  20[L]  |  7.73[H]    Ca    7.3[L]      13 Jun 2025 07:08  Phos  5.2     06-13  Mg     2.2     06-13    TPro  5.5[L]  /  Alb  2.9[L]  /  TBili  0.4  /  DBili  x   /  AST  22  /  ALT  19  /  AlkPhos  76  06-12

## 2025-06-15 LAB
CULTURE RESULTS: SIGNIFICANT CHANGE UP
CULTURE RESULTS: SIGNIFICANT CHANGE UP
SPECIMEN SOURCE: SIGNIFICANT CHANGE UP
SPECIMEN SOURCE: SIGNIFICANT CHANGE UP

## 2025-06-20 LAB
INSULIN FREE SERPL-ACNC: 16 UU/ML — SIGNIFICANT CHANGE UP
INSULIN: 17 UU/ML — SIGNIFICANT CHANGE UP

## 2025-06-27 NOTE — PATIENT PROFILE ADULT - NSPROHMDIABETMGMTSTRAT_GEN_A_NUR
Problem: Discharge Planning  Goal: Discharge to home or other facility with appropriate resources  6/27/2025 1303 by Alban Hinojosa RN  Outcome: Progressing  6/27/2025 0255 by Rosa Elena Donohue LPN  Outcome: Progressing     Problem: Safety - Adult  Goal: Free from fall injury  6/27/2025 1303 by Alban Hinojosa RN  Outcome: Progressing  6/27/2025 0255 by Rosa Elena Donohue LPN  Outcome: Progressing     Problem: Respiratory - Adult  Goal: Achieves optimal ventilation and oxygenation  6/27/2025 1303 by Alban Hinojosa RN  Outcome: Progressing  6/27/2025 0255 by Rosa Elena Donohue LPN  Outcome: Progressing     Problem: Gastrointestinal - Adult  Goal: Minimal or absence of nausea and vomiting  6/27/2025 1303 by Alban Hinojosa RN  Outcome: Progressing  6/27/2025 0255 by Rosa Elena Donohue LPN  Outcome: Progressing  Goal: Maintains or returns to baseline bowel function  6/27/2025 0255 by Rosa Elena Donohue LPN  Outcome: Progressing     Problem: Infection - Adult  Goal: Absence of infection at discharge  6/27/2025 0255 by Rosa Elena Donohue LPN  Outcome: Progressing  Goal: Absence of infection during hospitalization  6/27/2025 0255 by Rosa Elena Donohue LPN  Outcome: Progressing     Problem: Pain  Goal: Verbalizes/displays adequate comfort level or baseline comfort level  6/27/2025 0255 by Rosa Elena Donohue LPN  Outcome: Progressing      blood glucose testing/insulin therapy

## 2025-07-07 ENCOUNTER — RX RENEWAL (OUTPATIENT)
Age: 65
End: 2025-07-07

## 2025-07-21 ENCOUNTER — NON-APPOINTMENT (OUTPATIENT)
Age: 65
End: 2025-07-21

## 2025-07-24 ENCOUNTER — APPOINTMENT (OUTPATIENT)
Dept: ENDOCRINOLOGY | Facility: CLINIC | Age: 65
End: 2025-07-24

## 2025-07-28 PROBLEM — E16.2 HYPOGLYCEMIA, UNSPECIFIED: Chronic | Status: ACTIVE | Noted: 2025-06-10

## 2025-08-18 ENCOUNTER — APPOINTMENT (OUTPATIENT)
Dept: WOUND CARE | Facility: CLINIC | Age: 65
End: 2025-08-18

## 2025-08-25 ENCOUNTER — EMERGENCY (EMERGENCY)
Facility: HOSPITAL | Age: 65
LOS: 1 days | End: 2025-08-25
Attending: EMERGENCY MEDICINE
Payer: MEDICARE

## 2025-08-25 VITALS
HEART RATE: 83 BPM | OXYGEN SATURATION: 95 % | DIASTOLIC BLOOD PRESSURE: 80 MMHG | WEIGHT: 190.04 LBS | TEMPERATURE: 98 F | RESPIRATION RATE: 18 BRPM | SYSTOLIC BLOOD PRESSURE: 176 MMHG | HEIGHT: 67 IN

## 2025-08-25 VITALS
OXYGEN SATURATION: 97 % | RESPIRATION RATE: 17 BRPM | HEART RATE: 78 BPM | TEMPERATURE: 98 F | DIASTOLIC BLOOD PRESSURE: 74 MMHG | SYSTOLIC BLOOD PRESSURE: 168 MMHG

## 2025-08-25 DIAGNOSIS — I77.0 ARTERIOVENOUS FISTULA, ACQUIRED: Chronic | ICD-10-CM

## 2025-08-25 DIAGNOSIS — Z95.5 PRESENCE OF CORONARY ANGIOPLASTY IMPLANT AND GRAFT: Chronic | ICD-10-CM

## 2025-08-25 DIAGNOSIS — Z98.89 OTHER SPECIFIED POSTPROCEDURAL STATES: Chronic | ICD-10-CM

## 2025-08-25 DIAGNOSIS — Z98.890 OTHER SPECIFIED POSTPROCEDURAL STATES: Chronic | ICD-10-CM

## 2025-08-25 PROBLEM — Z29.9 ENCOUNTER FOR PROPHYLACTIC MEASURES, UNSPECIFIED: Chronic | Status: ACTIVE | Noted: 2025-06-10

## 2025-08-25 PROBLEM — N18.6 END STAGE RENAL DISEASE: Chronic | Status: ACTIVE | Noted: 2025-06-10

## 2025-08-25 PROBLEM — G92.8 OTHER TOXIC ENCEPHALOPATHY: Chronic | Status: ACTIVE | Noted: 2025-06-10

## 2025-08-25 PROCEDURE — 73090 X-RAY EXAM OF FOREARM: CPT | Mod: 26,LT,77

## 2025-08-25 PROCEDURE — 99053 MED SERV 10PM-8AM 24 HR FAC: CPT

## 2025-08-25 PROCEDURE — 73100 X-RAY EXAM OF WRIST: CPT | Mod: 26,RT

## 2025-08-25 PROCEDURE — 99284 EMERGENCY DEPT VISIT MOD MDM: CPT | Mod: 25

## 2025-08-25 PROCEDURE — 73080 X-RAY EXAM OF ELBOW: CPT

## 2025-08-25 PROCEDURE — 73100 X-RAY EXAM OF WRIST: CPT

## 2025-08-25 PROCEDURE — 73110 X-RAY EXAM OF WRIST: CPT

## 2025-08-25 PROCEDURE — 73080 X-RAY EXAM OF ELBOW: CPT | Mod: 26,RT

## 2025-08-25 PROCEDURE — 73090 X-RAY EXAM OF FOREARM: CPT | Mod: 26,LT

## 2025-08-25 PROCEDURE — 73110 X-RAY EXAM OF WRIST: CPT | Mod: 26,RT

## 2025-08-25 PROCEDURE — 25605 CLTX DST RDL FX/EPHYS SEP W/: CPT | Mod: RT

## 2025-08-25 PROCEDURE — 73090 X-RAY EXAM OF FOREARM: CPT

## 2025-08-25 PROCEDURE — 25605 CLTX DST RDL FX/EPHYS SEP W/: CPT | Mod: 54,RT

## 2025-08-25 PROCEDURE — 99284 EMERGENCY DEPT VISIT MOD MDM: CPT | Mod: 57

## 2025-08-25 RX ORDER — OXYCODONE HYDROCHLORIDE 30 MG/1
5 TABLET ORAL ONCE
Refills: 0 | Status: DISCONTINUED | OUTPATIENT
Start: 2025-08-25 | End: 2025-08-25

## 2025-08-25 RX ORDER — LIDOCAINE HCL/PF 10 MG/ML
10 VIAL (ML) INJECTION ONCE
Refills: 0 | Status: COMPLETED | OUTPATIENT
Start: 2025-08-25 | End: 2025-08-25

## 2025-08-25 RX ORDER — OXYCODONE HYDROCHLORIDE 30 MG/1
1 TABLET ORAL
Refills: 0
Start: 2025-08-25

## 2025-08-25 RX ORDER — ACETAMINOPHEN 500 MG/5ML
975 LIQUID (ML) ORAL ONCE
Refills: 0 | Status: COMPLETED | OUTPATIENT
Start: 2025-08-25 | End: 2025-08-25

## 2025-08-25 RX ORDER — OXYCODONE HYDROCHLORIDE 30 MG/1
1 TABLET ORAL
Qty: 8 | Refills: 0
Start: 2025-08-25 | End: 2025-08-26

## 2025-08-25 RX ADMIN — Medication 975 MILLIGRAM(S): at 04:48

## 2025-08-25 RX ADMIN — OXYCODONE HYDROCHLORIDE 5 MILLIGRAM(S): 30 TABLET ORAL at 04:48

## 2025-08-25 RX ADMIN — Medication 10 MILLILITER(S): at 06:30

## 2025-09-02 ENCOUNTER — APPOINTMENT (OUTPATIENT)
Dept: ORTHOPEDIC SURGERY | Facility: CLINIC | Age: 65
End: 2025-09-02
Payer: MEDICARE

## 2025-09-02 DIAGNOSIS — S52.571A OTHER INTRAARTICULAR FRACTURE OF LOWER END OF RIGHT RADIUS, INITIAL ENCOUNTER FOR CLOSED FRACTURE: ICD-10-CM

## 2025-09-02 PROBLEM — S52.572A OTHER CLOSED INTRA-ARTICULAR FRACTURE OF DISTAL END OF LEFT RADIUS, INITIAL ENCOUNTER: Status: ACTIVE | Noted: 2025-09-02

## 2025-09-02 PROCEDURE — 73110 X-RAY EXAM OF WRIST: CPT | Mod: RT

## 2025-09-02 PROCEDURE — 99203 OFFICE O/P NEW LOW 30 MIN: CPT

## 2025-09-09 ENCOUNTER — APPOINTMENT (OUTPATIENT)
Dept: ORTHOPEDIC SURGERY | Facility: CLINIC | Age: 65
End: 2025-09-09
Payer: MEDICARE

## 2025-09-09 PROCEDURE — 99213 OFFICE O/P EST LOW 20 MIN: CPT

## 2025-09-09 PROCEDURE — 73110 X-RAY EXAM OF WRIST: CPT | Mod: RT

## 2025-09-16 ENCOUNTER — APPOINTMENT (OUTPATIENT)
Dept: ENDOCRINOLOGY | Facility: CLINIC | Age: 65
End: 2025-09-16

## 2025-09-16 ENCOUNTER — APPOINTMENT (OUTPATIENT)
Dept: ORTHOPEDIC SURGERY | Facility: CLINIC | Age: 65
End: 2025-09-16

## 2025-09-16 DIAGNOSIS — S52.572A OTHER INTRAARTICULAR FRACTURE OF LOWER END OF LEFT RADIUS, INITIAL ENCOUNTER FOR CLOSED FRACTURE: ICD-10-CM
